# Patient Record
Sex: MALE | Race: WHITE | Employment: OTHER | URBAN - METROPOLITAN AREA
[De-identification: names, ages, dates, MRNs, and addresses within clinical notes are randomized per-mention and may not be internally consistent; named-entity substitution may affect disease eponyms.]

---

## 2017-10-12 PROBLEM — J18.9 LEFT LOWER LOBE PNEUMONIA: Status: ACTIVE | Noted: 2017-10-12

## 2017-10-12 PROBLEM — J44.9 COPD (CHRONIC OBSTRUCTIVE PULMONARY DISEASE) (HCC): Chronic | Status: ACTIVE | Noted: 2017-10-12

## 2017-10-12 PROBLEM — J96.21 ACUTE AND CHRONIC RESPIRATORY FAILURE WITH HYPOXIA (HCC): Status: ACTIVE | Noted: 2017-10-12

## 2017-12-22 PROBLEM — S92.321A: Status: ACTIVE | Noted: 2017-12-22

## 2018-02-21 PROBLEM — S92.321D CLOSED DISPLACED FRACTURE OF SECOND METATARSAL BONE OF RIGHT FOOT WITH ROUTINE HEALING: Status: ACTIVE | Noted: 2017-12-22

## 2018-02-23 PROBLEM — J44.1 COPD EXACERBATION (HCC): Status: ACTIVE | Noted: 2018-02-23

## 2018-04-16 DIAGNOSIS — S92.321D CLOSED DISPLACED FRACTURE OF SECOND METATARSAL BONE OF RIGHT FOOT WITH ROUTINE HEALING: Primary | ICD-10-CM

## 2018-04-18 ENCOUNTER — HOSPITAL ENCOUNTER (OUTPATIENT)
Dept: GENERAL RADIOLOGY | Age: 68
Discharge: HOME | End: 2018-04-20
Payer: COMMERCIAL

## 2018-04-18 ENCOUNTER — OFFICE VISIT (OUTPATIENT)
Dept: ORTHOPEDIC SURGERY | Age: 68
End: 2018-04-18
Payer: COMMERCIAL

## 2018-04-18 VITALS
HEIGHT: 73 IN | DIASTOLIC BLOOD PRESSURE: 84 MMHG | WEIGHT: 210 LBS | BODY MASS INDEX: 27.83 KG/M2 | TEMPERATURE: 97.4 F | HEART RATE: 83 BPM | SYSTOLIC BLOOD PRESSURE: 117 MMHG | RESPIRATION RATE: 18 BRPM

## 2018-04-18 DIAGNOSIS — S92.321D CLOSED DISPLACED FRACTURE OF SECOND METATARSAL BONE OF RIGHT FOOT WITH ROUTINE HEALING: Primary | ICD-10-CM

## 2018-04-18 DIAGNOSIS — S92.321D CLOSED DISPLACED FRACTURE OF SECOND METATARSAL BONE OF RIGHT FOOT WITH ROUTINE HEALING: ICD-10-CM

## 2018-04-18 PROCEDURE — 99212 OFFICE O/P EST SF 10 MIN: CPT

## 2018-04-18 PROCEDURE — 73630 X-RAY EXAM OF FOOT: CPT

## 2018-04-18 PROCEDURE — 99213 OFFICE O/P EST LOW 20 MIN: CPT | Performed by: ORTHOPAEDIC SURGERY

## 2018-11-12 ENCOUNTER — APPOINTMENT (OUTPATIENT)
Dept: GENERAL RADIOLOGY | Age: 68
DRG: 193 | End: 2018-11-12
Payer: MEDICARE

## 2018-11-12 ENCOUNTER — HOSPITAL ENCOUNTER (INPATIENT)
Age: 68
LOS: 4 days | Discharge: HOME | DRG: 193 | End: 2018-11-16
Attending: EMERGENCY MEDICINE | Admitting: INTERNAL MEDICINE
Payer: MEDICARE

## 2018-11-12 DIAGNOSIS — R07.9 CHEST PAIN, UNSPECIFIED TYPE: ICD-10-CM

## 2018-11-12 DIAGNOSIS — J44.1 COPD WITH ACUTE EXACERBATION (HCC): Primary | ICD-10-CM

## 2018-11-12 DIAGNOSIS — R09.02 HYPOXIA: ICD-10-CM

## 2018-11-12 PROBLEM — J43.9 BULLOUS EMPHYSEMA (HCC): Status: ACTIVE | Noted: 2018-11-12

## 2018-11-12 PROBLEM — J96.01 ACUTE RESPIRATORY FAILURE WITH HYPOXIA (HCC): Status: ACTIVE | Noted: 2018-11-12

## 2018-11-12 LAB
ALBUMIN SERPL-MCNC: 4.2 G/DL (ref 3.5–5.2)
ALP BLD-CCNC: 71 U/L (ref 40–129)
ALT SERPL-CCNC: 8 U/L (ref 0–40)
ANION GAP SERPL CALCULATED.3IONS-SCNC: 13 MMOL/L (ref 7–16)
AST SERPL-CCNC: 12 U/L (ref 0–39)
BASOPHILS ABSOLUTE: 0.02 E9/L (ref 0–0.2)
BASOPHILS RELATIVE PERCENT: 0.2 % (ref 0–2)
BILIRUB SERPL-MCNC: 0.4 MG/DL (ref 0–1.2)
BUN BLDV-MCNC: 12 MG/DL (ref 8–23)
CALCIUM SERPL-MCNC: 9.5 MG/DL (ref 8.6–10.2)
CHLORIDE BLD-SCNC: 100 MMOL/L (ref 98–107)
CO2: 26 MMOL/L (ref 22–29)
CREAT SERPL-MCNC: 0.8 MG/DL (ref 0.7–1.2)
D DIMER: 207 NG/ML DDU
EKG ATRIAL RATE: 63 BPM
EKG ATRIAL RATE: 74 BPM
EKG P AXIS: 52 DEGREES
EKG P-R INTERVAL: 170 MS
EKG Q-T INTERVAL: 406 MS
EKG Q-T INTERVAL: 416 MS
EKG QRS DURATION: 100 MS
EKG QRS DURATION: 106 MS
EKG QTC CALCULATION (BAZETT): 429 MS
EKG QTC CALCULATION (BAZETT): 450 MS
EKG R AXIS: 11 DEGREES
EKG R AXIS: 77 DEGREES
EKG T AXIS: 7 DEGREES
EKG T AXIS: 74 DEGREES
EKG VENTRICULAR RATE: 64 BPM
EKG VENTRICULAR RATE: 74 BPM
EOSINOPHILS ABSOLUTE: 0.02 E9/L (ref 0.05–0.5)
EOSINOPHILS RELATIVE PERCENT: 0.2 % (ref 0–6)
FILM ARRAY ADENOVIRUS: NORMAL
FILM ARRAY BORDETELLA PERTUSSIS: NORMAL
FILM ARRAY CHLAMYDOPHILIA PNEUMONIAE: NORMAL
FILM ARRAY CORONAVIRUS 229E: NORMAL
FILM ARRAY CORONAVIRUS HKU1: NORMAL
FILM ARRAY CORONAVIRUS NL63: NORMAL
FILM ARRAY CORONAVIRUS OC43: NORMAL
FILM ARRAY INFLUENZA A VIRUS 09H1: NORMAL
FILM ARRAY INFLUENZA A VIRUS H1: NORMAL
FILM ARRAY INFLUENZA A VIRUS H3: NORMAL
FILM ARRAY INFLUENZA A VIRUS: NORMAL
FILM ARRAY INFLUENZA B: NORMAL
FILM ARRAY METAPNEUMOVIRUS: NORMAL
FILM ARRAY MYCOPLASMA PNEUMONIAE: NORMAL
FILM ARRAY PARAINFLUENZA VIRUS 1: NORMAL
FILM ARRAY PARAINFLUENZA VIRUS 2: NORMAL
FILM ARRAY PARAINFLUENZA VIRUS 3: NORMAL
FILM ARRAY PARAINFLUENZA VIRUS 4: NORMAL
FILM ARRAY RESPIRATORY SYNCITIAL VIRUS: NORMAL
FILM ARRAY RHINOVIRUS/ENTEROVIRUS: NORMAL
FOLATE: 14.1 NG/ML (ref 4.8–24.2)
GFR AFRICAN AMERICAN: >60
GFR NON-AFRICAN AMERICAN: >60 ML/MIN/1.73
GLUCOSE BLD-MCNC: 127 MG/DL (ref 74–99)
HBA1C MFR BLD: 5.6 % (ref 4–5.6)
HCT VFR BLD CALC: 47 % (ref 37–54)
HEMOGLOBIN: 15.6 G/DL (ref 12.5–16.5)
IMMATURE GRANULOCYTES #: 0.04 E9/L
IMMATURE GRANULOCYTES %: 0.5 % (ref 0–5)
LACTIC ACID, SEPSIS: 2.1 MMOL/L (ref 0.5–1.9)
LYMPHOCYTES ABSOLUTE: 0.98 E9/L (ref 1.5–4)
LYMPHOCYTES RELATIVE PERCENT: 11.6 % (ref 20–42)
MCH RBC QN AUTO: 30.7 PG (ref 26–35)
MCHC RBC AUTO-ENTMCNC: 33.2 % (ref 32–34.5)
MCV RBC AUTO: 92.5 FL (ref 80–99.9)
MONOCYTES ABSOLUTE: 0.22 E9/L (ref 0.1–0.95)
MONOCYTES RELATIVE PERCENT: 2.6 % (ref 2–12)
NEUTROPHILS ABSOLUTE: 7.15 E9/L (ref 1.8–7.3)
NEUTROPHILS RELATIVE PERCENT: 84.9 % (ref 43–80)
PDW BLD-RTO: 14.2 FL (ref 11.5–15)
PHOSPHORUS: 2.9 MG/DL (ref 2.5–4.5)
PLATELET # BLD: 198 E9/L (ref 130–450)
PMV BLD AUTO: 9.3 FL (ref 7–12)
POTASSIUM SERPL-SCNC: 3.8 MMOL/L (ref 3.5–5)
PRO-BNP: 204 PG/ML (ref 0–125)
PROCALCITONIN: 0.03 NG/ML (ref 0–0.08)
RBC # BLD: 5.08 E12/L (ref 3.8–5.8)
SODIUM BLD-SCNC: 139 MMOL/L (ref 132–146)
TOTAL PROTEIN: 6.9 G/DL (ref 6.4–8.3)
TROPONIN: <0.01 NG/ML (ref 0–0.03)
VITAMIN B-12: 401 PG/ML (ref 211–946)
WBC # BLD: 8.4 E9/L (ref 4.5–11.5)

## 2018-11-12 PROCEDURE — 94761 N-INVAS EAR/PLS OXIMETRY MLT: CPT

## 2018-11-12 PROCEDURE — 82746 ASSAY OF FOLIC ACID SERUM: CPT

## 2018-11-12 PROCEDURE — 96374 THER/PROPH/DIAG INJ IV PUSH: CPT

## 2018-11-12 PROCEDURE — 94640 AIRWAY INHALATION TREATMENT: CPT

## 2018-11-12 PROCEDURE — 84145 PROCALCITONIN (PCT): CPT

## 2018-11-12 PROCEDURE — 2580000003 HC RX 258: Performed by: INTERNAL MEDICINE

## 2018-11-12 PROCEDURE — 87633 RESP VIRUS 12-25 TARGETS: CPT

## 2018-11-12 PROCEDURE — 6370000000 HC RX 637 (ALT 250 FOR IP): Performed by: INTERNAL MEDICINE

## 2018-11-12 PROCEDURE — 94664 DEMO&/EVAL PT USE INHALER: CPT

## 2018-11-12 PROCEDURE — 83036 HEMOGLOBIN GLYCOSYLATED A1C: CPT

## 2018-11-12 PROCEDURE — 93005 ELECTROCARDIOGRAM TRACING: CPT | Performed by: EMERGENCY MEDICINE

## 2018-11-12 PROCEDURE — 87798 DETECT AGENT NOS DNA AMP: CPT

## 2018-11-12 PROCEDURE — 99285 EMERGENCY DEPT VISIT HI MDM: CPT

## 2018-11-12 PROCEDURE — 6370000000 HC RX 637 (ALT 250 FOR IP): Performed by: EMERGENCY MEDICINE

## 2018-11-12 PROCEDURE — 83880 ASSAY OF NATRIURETIC PEPTIDE: CPT

## 2018-11-12 PROCEDURE — 87070 CULTURE OTHR SPECIMN AEROBIC: CPT

## 2018-11-12 PROCEDURE — 84100 ASSAY OF PHOSPHORUS: CPT

## 2018-11-12 PROCEDURE — 6360000002 HC RX W HCPCS: Performed by: INTERNAL MEDICINE

## 2018-11-12 PROCEDURE — 87040 BLOOD CULTURE FOR BACTERIA: CPT

## 2018-11-12 PROCEDURE — 87450 HC DIRECT STREP B ANTIGEN: CPT

## 2018-11-12 PROCEDURE — 85025 COMPLETE CBC W/AUTO DIFF WBC: CPT

## 2018-11-12 PROCEDURE — 87205 SMEAR GRAM STAIN: CPT

## 2018-11-12 PROCEDURE — 87088 URINE BACTERIA CULTURE: CPT

## 2018-11-12 PROCEDURE — 6360000002 HC RX W HCPCS: Performed by: EMERGENCY MEDICINE

## 2018-11-12 PROCEDURE — 82607 VITAMIN B-12: CPT

## 2018-11-12 PROCEDURE — 85378 FIBRIN DEGRADE SEMIQUANT: CPT

## 2018-11-12 PROCEDURE — 87486 CHLMYD PNEUM DNA AMP PROBE: CPT

## 2018-11-12 PROCEDURE — 83605 ASSAY OF LACTIC ACID: CPT

## 2018-11-12 PROCEDURE — 36415 COLL VENOUS BLD VENIPUNCTURE: CPT

## 2018-11-12 PROCEDURE — 71046 X-RAY EXAM CHEST 2 VIEWS: CPT

## 2018-11-12 PROCEDURE — 84484 ASSAY OF TROPONIN QUANT: CPT

## 2018-11-12 PROCEDURE — 80053 COMPREHEN METABOLIC PANEL: CPT

## 2018-11-12 PROCEDURE — 2060000000 HC ICU INTERMEDIATE R&B

## 2018-11-12 PROCEDURE — 87581 M.PNEUMON DNA AMP PROBE: CPT

## 2018-11-12 RX ORDER — ONDANSETRON 2 MG/ML
4 INJECTION INTRAMUSCULAR; INTRAVENOUS EVERY 6 HOURS PRN
Status: DISCONTINUED | OUTPATIENT
Start: 2018-11-12 | End: 2018-11-16 | Stop reason: HOSPADM

## 2018-11-12 RX ORDER — SODIUM CHLORIDE 9 MG/ML
INJECTION, SOLUTION INTRAVENOUS CONTINUOUS
Status: DISCONTINUED | OUTPATIENT
Start: 2018-11-12 | End: 2018-11-13

## 2018-11-12 RX ORDER — PREDNISONE 20 MG/1
1 TABLET ORAL EVERY MORNING
Refills: 0 | COMMUNITY
Start: 2018-11-06 | End: 2018-11-12

## 2018-11-12 RX ORDER — METHYLPREDNISOLONE SODIUM SUCCINATE 125 MG/2ML
80 INJECTION, POWDER, LYOPHILIZED, FOR SOLUTION INTRAMUSCULAR; INTRAVENOUS EVERY 8 HOURS
Status: DISCONTINUED | OUTPATIENT
Start: 2018-11-12 | End: 2018-11-13

## 2018-11-12 RX ORDER — PANTOPRAZOLE SODIUM 40 MG/1
40 TABLET, DELAYED RELEASE ORAL
Status: DISCONTINUED | OUTPATIENT
Start: 2018-11-13 | End: 2018-11-16 | Stop reason: HOSPADM

## 2018-11-12 RX ORDER — METHYLPREDNISOLONE SODIUM SUCCINATE 125 MG/2ML
125 INJECTION, POWDER, LYOPHILIZED, FOR SOLUTION INTRAMUSCULAR; INTRAVENOUS ONCE
Status: COMPLETED | OUTPATIENT
Start: 2018-11-12 | End: 2018-11-12

## 2018-11-12 RX ORDER — BENZONATATE 100 MG/1
100 CAPSULE ORAL 3 TIMES DAILY
Status: DISCONTINUED | OUTPATIENT
Start: 2018-11-12 | End: 2018-11-14

## 2018-11-12 RX ORDER — BUDESONIDE 0.25 MG/2ML
250 INHALANT ORAL 2 TIMES DAILY
Status: DISCONTINUED | OUTPATIENT
Start: 2018-11-12 | End: 2018-11-12

## 2018-11-12 RX ORDER — ACETAMINOPHEN 325 MG/1
650 TABLET ORAL EVERY 4 HOURS PRN
Status: DISCONTINUED | OUTPATIENT
Start: 2018-11-12 | End: 2018-11-16 | Stop reason: HOSPADM

## 2018-11-12 RX ORDER — GUAIFENESIN AND CODEINE PHOSPHATE 100; 10 MG/5ML; MG/5ML
5 SOLUTION ORAL
COMMUNITY
End: 2019-04-22 | Stop reason: ALTCHOICE

## 2018-11-12 RX ORDER — CODEINE PHOSPHATE AND GUAIFENESIN 10; 100 MG/5ML; MG/5ML
5 SOLUTION ORAL EVERY 4 HOURS PRN
Status: DISCONTINUED | OUTPATIENT
Start: 2018-11-12 | End: 2018-11-16 | Stop reason: HOSPADM

## 2018-11-12 RX ORDER — IPRATROPIUM BROMIDE AND ALBUTEROL SULFATE 2.5; .5 MG/3ML; MG/3ML
1 SOLUTION RESPIRATORY (INHALATION)
Status: DISCONTINUED | OUTPATIENT
Start: 2018-11-12 | End: 2018-11-13

## 2018-11-12 RX ORDER — IPRATROPIUM BROMIDE AND ALBUTEROL SULFATE 2.5; .5 MG/3ML; MG/3ML
3 SOLUTION RESPIRATORY (INHALATION) ONCE
Status: COMPLETED | OUTPATIENT
Start: 2018-11-12 | End: 2018-11-12

## 2018-11-12 RX ORDER — IBUPROFEN 200 MG
200 TABLET ORAL
Status: DISCONTINUED | OUTPATIENT
Start: 2018-11-12 | End: 2018-11-16 | Stop reason: HOSPADM

## 2018-11-12 RX ORDER — BUDESONIDE 0.25 MG/2ML
1000 INHALANT ORAL 2 TIMES DAILY
Status: DISCONTINUED | OUTPATIENT
Start: 2018-11-12 | End: 2018-11-16 | Stop reason: HOSPADM

## 2018-11-12 RX ORDER — FORMOTEROL FUMARATE 20 UG/2ML
20 SOLUTION RESPIRATORY (INHALATION) 2 TIMES DAILY
Status: DISCONTINUED | OUTPATIENT
Start: 2018-11-12 | End: 2018-11-16 | Stop reason: HOSPADM

## 2018-11-12 RX ADMIN — BUDESONIDE 1000 MCG: 0.25 SUSPENSION RESPIRATORY (INHALATION) at 21:10

## 2018-11-12 RX ADMIN — BENZONATATE 100 MG: 100 CAPSULE ORAL at 20:06

## 2018-11-12 RX ADMIN — SODIUM CHLORIDE: 9 INJECTION, SOLUTION INTRAVENOUS at 14:39

## 2018-11-12 RX ADMIN — ENOXAPARIN SODIUM 40 MG: 40 INJECTION SUBCUTANEOUS at 15:56

## 2018-11-12 RX ADMIN — METHYLPREDNISOLONE SODIUM SUCCINATE 125 MG: 125 INJECTION, POWDER, FOR SOLUTION INTRAMUSCULAR; INTRAVENOUS at 11:44

## 2018-11-12 RX ADMIN — IPRATROPIUM BROMIDE AND ALBUTEROL SULFATE 1 AMPULE: .5; 3 SOLUTION RESPIRATORY (INHALATION) at 21:10

## 2018-11-12 RX ADMIN — FORMOTEROL FUMARATE DIHYDRATE 20 MCG: 20 SOLUTION RESPIRATORY (INHALATION) at 21:10

## 2018-11-12 RX ADMIN — BENZONATATE 100 MG: 100 CAPSULE ORAL at 15:56

## 2018-11-12 RX ADMIN — IPRATROPIUM BROMIDE AND ALBUTEROL SULFATE 1 AMPULE: .5; 3 SOLUTION RESPIRATORY (INHALATION) at 17:45

## 2018-11-12 RX ADMIN — IPRATROPIUM BROMIDE AND ALBUTEROL SULFATE 3 AMPULE: .5; 3 SOLUTION RESPIRATORY (INHALATION) at 10:46

## 2018-11-12 RX ADMIN — METHYLPREDNISOLONE SODIUM SUCCINATE 80 MG: 125 INJECTION, POWDER, LYOPHILIZED, FOR SOLUTION INTRAMUSCULAR; INTRAVENOUS at 20:06

## 2018-11-12 ASSESSMENT — ENCOUNTER SYMPTOMS
SPUTUM PRODUCTION: 0
ABDOMINAL PAIN: 0
HEMOPTYSIS: 0
COUGH: 1
VOMITING: 0
WHEEZING: 0
SORE THROAT: 0
SHORTNESS OF BREATH: 1

## 2018-11-12 ASSESSMENT — PAIN SCALES - GENERAL
PAINLEVEL_OUTOF10: 0
PAINLEVEL_OUTOF10: 0

## 2018-11-12 NOTE — ED PROVIDER NOTES
---------------------------  Date / Time Roomed:  11/12/2018 10:08 AM  ED Bed Assignment:  13/13    The nursing notes within the ED encounter and vital signs as below have been reviewed. Patient Vitals for the past 24 hrs:   BP Temp Temp src Pulse Resp SpO2 Height Weight   11/12/18 1146 - - - - - 93 % - -   11/12/18 1015 124/77 97.8 °F (36.6 °C) Oral 66 20 (!) 85 % 6' 1\" (1.854 m) 205 lb (93 kg)       Oxygen Saturation Interpretation: Abnormal and Improved after treatment    ------------------------------------------ PROGRESS NOTES ------------------------------------------  Re-evaluation(s):  See above    Counseling:  I have spoken with the patient and discussed todays results, in addition to providing specific details for the plan of care and counseling regarding the diagnosis and prognosis. Their questions are answered at this time and they are agreeable with the plan of admission.    --------------------------------- ADDITIONAL PROVIDER NOTES ---------------------------------  Consultations:  Time: 9618. Spoke with Dr. Lisbeth Jeffery. Discussed case. They will admit the patient. This patient's ED course included: a personal history and physicial examination, re-evaluation prior to disposition, multiple bedside re-evaluations, IV medications, cardiac monitoring and continuous pulse oximetry    This patient has remained hemodynamically stable during their ED course. Diagnosis:  1. COPD with acute exacerbation (HCC)    2. Chest pain, unspecified type    3. Hypoxia        Disposition:  Patient's disposition: Admit to telemetry  Patient's condition is stable.               Kiersten Villalta DO  Resident  11/12/18 7480

## 2018-11-12 NOTE — PLAN OF CARE
Problem: Gas Exchange - Impaired:  Goal: Able to breathe comfortably  Able to breathe comfortably    Outcome: Met This Shift

## 2018-11-12 NOTE — CONSULTS
weakness or joint complaints. · Integumentary: No rash or pruritis. No abnormal pigmentation, hair or nail changes. · Neurological: No headache, diplopia, dizziness, tremor, change in muscle strength, numbness or tingling. No change in gait, balance, coordination, mood, affect, memory, mentation, behavior. · Psychiatric: No anxiety or depression. · Endocrine: No temperature intolerance, excessive thirst, fluid intake, urinary frequency, excessive appetite, or recent weight change. · Hematologic/Lymphatic: No abnormal bruising or bleeding, blood clots or swollen lymph nodes. No anemia, fever, chills, night sweats, or swollen glands. · Allergic/Immunologic: No seasonal or perenial allergies. No history of hives or atopic dermatitis. Social History:  · Alcohol:  No  · Tobacco:   Quit   · Employment:  no silica or asbestos exposure  · Family:  No family history of lung disease    Medications:   sodium chloride 100 mL/hr at 18 1439      ibuprofen  200 mg Oral TID WC    enoxaparin  40 mg Subcutaneous Daily    formoterol  20 mcg Nebulization BID    benzonatate  100 mg Oral TID    ipratropium-albuterol  1 ampule Inhalation Q4H WA    [START ON 2018] pantoprazole  40 mg Oral QAM AC    methylPREDNISolone  80 mg Intravenous Q8H    budesonide  1,000 mcg Nebulization BID       Vitals:  Tmax:  VITALS:  BP (!) 115/56   Pulse 73   Temp 97.7 °F (36.5 °C) (Oral)   Resp 18   Ht 6' 1\" (1.854 m)   Wt 206 lb 1 oz (93.5 kg)   SpO2 92%   BMI 27.19 kg/m²   24HR INTAKE/OUTPUT:  No intake or output data in the 24 hours ending 18 1624  CURRENT PULSE OXIMETRY:  SpO2: 92 %  24HR PULSE OXIMETRY RANGE:  SpO2  Av.5 %  Min: 85 %  Max: 93 %    EXAM:  General: No distress. Alert. Eyes: PERRL. No sclera icterus. No conjunctival injection. ENT: No discharge. Pharynx clear. Neck: Trachea midline. Normal thyroid. No jvd, no hjr. Resp: Minimal wheezing. No accessory muscle use. Bibasilar rales.  No rhonchi. CV: Regular rate. Regular rhythm. No murmur No rub. Abd: Non-tender. Non-distended. No masses. No organmegaly. Normal bowel sounds. Skin: Warm and dry. No nodule on exposed extremities. No rash on exposed extremities. Lymph: No cervical LAD. No supraclavicular LAD. Ext: No joint deformity. No clubbing. No cyanosis. No edema  Neuro: Awake. Follows commands. Positive pupils/gag/corneals. Normal pain response. Lab Results:  CBC:   Recent Labs      11/12/18   1136   WBC  8.4   HGB  15.6   HCT  47.0   MCV  92.5   PLT  198       BMP:  Recent Labs      11/12/18   1136  11/12/18   1558   NA  139   --    K  3.8   --    CL  100   --    CO2  26   --    PHOS   --   2.9   BUN  12   --    CREATININE  0.8   --     ALB:3,BILIDIR:3,BILITOT:3,ALKPHOS:3)@    PT/INR: No results for input(s): PROTIME, INR in the last 72 hours. Cultures:  Sputum: not available  Blood: not available    ABG:   No results for input(s): PH, PO2, PCO2, HCO3, BE, O2SAT, METHB, O2HB, COHB, O2CON, HHB, THB in the last 72 hours. Films:     XR CHEST STANDARD (2 VW)   Final Result   Chronic obstructive pulmonary disease      Fibrosis seen in both lung bases      Right-sided pleural effusion with pleural reaction. Unchanged from prior study with no evidence of airspace consolidation   or pulmonary venous congestion         . Assessment:  1. Acute exacerbation of chronic obstructive pulmonary disease with failed outpatient treatment. 2. Chronic hypoxia secondary to #1  3. 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 Adjust aerosols  2. IV steroids  3. Supplemental oxygen  4. Infection screens      Thanks for letting us see this patient in consultation. Please contact us with any questions. Office [phone] or after hours through [employer], x [phone].     Please note that voice recognition technology was used in the preparation of this note and make therefore it may contain inadvertent transcription errors    Alicia Hdez M.D., FAbenaCAbenaCMILADYS     [facility], [address], [address] [place] [address]

## 2018-11-13 LAB
ALBUMIN SERPL-MCNC: 4 G/DL (ref 3.5–5.2)
ALP BLD-CCNC: 73 U/L (ref 40–129)
ALT SERPL-CCNC: 8 U/L (ref 0–40)
ANION GAP SERPL CALCULATED.3IONS-SCNC: 13 MMOL/L (ref 7–16)
AST SERPL-CCNC: 12 U/L (ref 0–39)
BASOPHILS ABSOLUTE: 0.01 E9/L (ref 0–0.2)
BASOPHILS RELATIVE PERCENT: 0.1 % (ref 0–2)
BILIRUB SERPL-MCNC: 0.3 MG/DL (ref 0–1.2)
BUN BLDV-MCNC: 12 MG/DL (ref 8–23)
CALCIUM SERPL-MCNC: 9.7 MG/DL (ref 8.6–10.2)
CHLORIDE BLD-SCNC: 103 MMOL/L (ref 98–107)
CHOLESTEROL, TOTAL: 151 MG/DL (ref 0–199)
CO2: 25 MMOL/L (ref 22–29)
CREAT SERPL-MCNC: 0.6 MG/DL (ref 0.7–1.2)
EOSINOPHILS ABSOLUTE: 0 E9/L (ref 0.05–0.5)
EOSINOPHILS RELATIVE PERCENT: 0 % (ref 0–6)
GFR AFRICAN AMERICAN: >60
GFR NON-AFRICAN AMERICAN: >60 ML/MIN/1.73
GLUCOSE BLD-MCNC: 148 MG/DL (ref 74–99)
HCT VFR BLD CALC: 44.7 % (ref 37–54)
HDLC SERPL-MCNC: 71 MG/DL
HEMOGLOBIN: 15 G/DL (ref 12.5–16.5)
IMMATURE GRANULOCYTES #: 0.05 E9/L
IMMATURE GRANULOCYTES %: 0.5 % (ref 0–5)
L. PNEUMOPHILA SEROGP 1 UR AG: NORMAL
LDL CHOLESTEROL CALCULATED: 67 MG/DL (ref 0–99)
LV EF: 62 %
LVEF MODALITY: NORMAL
LYMPHOCYTES ABSOLUTE: 0.51 E9/L (ref 1.5–4)
LYMPHOCYTES RELATIVE PERCENT: 4.8 % (ref 20–42)
MAGNESIUM: 2 MG/DL (ref 1.6–2.6)
MCH RBC QN AUTO: 30.4 PG (ref 26–35)
MCHC RBC AUTO-ENTMCNC: 33.6 % (ref 32–34.5)
MCV RBC AUTO: 90.7 FL (ref 80–99.9)
MONOCYTES ABSOLUTE: 0.11 E9/L (ref 0.1–0.95)
MONOCYTES RELATIVE PERCENT: 1 % (ref 2–12)
NEUTROPHILS ABSOLUTE: 9.96 E9/L (ref 1.8–7.3)
NEUTROPHILS RELATIVE PERCENT: 93.6 % (ref 43–80)
PDW BLD-RTO: 13.9 FL (ref 11.5–15)
PLATELET # BLD: 260 E9/L (ref 130–450)
PMV BLD AUTO: 9.9 FL (ref 7–12)
POTASSIUM SERPL-SCNC: 4.3 MMOL/L (ref 3.5–5)
RBC # BLD: 4.93 E12/L (ref 3.8–5.8)
SODIUM BLD-SCNC: 141 MMOL/L (ref 132–146)
STREP PNEUMONIAE ANTIGEN, URINE: NORMAL
TOTAL PROTEIN: 6.6 G/DL (ref 6.4–8.3)
TRIGL SERPL-MCNC: 67 MG/DL (ref 0–149)
TSH SERPL DL<=0.05 MIU/L-ACNC: 0.39 UIU/ML (ref 0.27–4.2)
URIC ACID, SERUM: 4.5 MG/DL (ref 3.4–7)
VLDLC SERPL CALC-MCNC: 13 MG/DL
WBC # BLD: 10.6 E9/L (ref 4.5–11.5)

## 2018-11-13 PROCEDURE — 6370000000 HC RX 637 (ALT 250 FOR IP): Performed by: INTERNAL MEDICINE

## 2018-11-13 PROCEDURE — 80053 COMPREHEN METABOLIC PANEL: CPT

## 2018-11-13 PROCEDURE — 83735 ASSAY OF MAGNESIUM: CPT

## 2018-11-13 PROCEDURE — 6360000002 HC RX W HCPCS: Performed by: INTERNAL MEDICINE

## 2018-11-13 PROCEDURE — G8989 SELF CARE D/C STATUS: HCPCS

## 2018-11-13 PROCEDURE — 2060000000 HC ICU INTERMEDIATE R&B

## 2018-11-13 PROCEDURE — 93306 TTE W/DOPPLER COMPLETE: CPT

## 2018-11-13 PROCEDURE — 94640 AIRWAY INHALATION TREATMENT: CPT

## 2018-11-13 PROCEDURE — 80061 LIPID PANEL: CPT

## 2018-11-13 PROCEDURE — G8988 SELF CARE GOAL STATUS: HCPCS

## 2018-11-13 PROCEDURE — 85025 COMPLETE CBC W/AUTO DIFF WBC: CPT

## 2018-11-13 PROCEDURE — 84443 ASSAY THYROID STIM HORMONE: CPT

## 2018-11-13 PROCEDURE — 84550 ASSAY OF BLOOD/URIC ACID: CPT

## 2018-11-13 PROCEDURE — 97161 PT EVAL LOW COMPLEX 20 MIN: CPT

## 2018-11-13 PROCEDURE — 36415 COLL VENOUS BLD VENIPUNCTURE: CPT

## 2018-11-13 PROCEDURE — 2580000003 HC RX 258: Performed by: INTERNAL MEDICINE

## 2018-11-13 PROCEDURE — 2700000000 HC OXYGEN THERAPY PER DAY

## 2018-11-13 PROCEDURE — G8987 SELF CARE CURRENT STATUS: HCPCS

## 2018-11-13 PROCEDURE — 97165 OT EVAL LOW COMPLEX 30 MIN: CPT

## 2018-11-13 RX ORDER — IPRATROPIUM BROMIDE AND ALBUTEROL SULFATE 2.5; .5 MG/3ML; MG/3ML
1 SOLUTION RESPIRATORY (INHALATION) EVERY 4 HOURS
Status: DISCONTINUED | OUTPATIENT
Start: 2018-11-13 | End: 2018-11-16 | Stop reason: HOSPADM

## 2018-11-13 RX ORDER — METHYLPREDNISOLONE SODIUM SUCCINATE 40 MG/ML
40 INJECTION, POWDER, LYOPHILIZED, FOR SOLUTION INTRAMUSCULAR; INTRAVENOUS EVERY 8 HOURS
Status: DISCONTINUED | OUTPATIENT
Start: 2018-11-13 | End: 2018-11-14

## 2018-11-13 RX ADMIN — METHYLPREDNISOLONE SODIUM SUCCINATE 40 MG: 40 INJECTION, POWDER, LYOPHILIZED, FOR SOLUTION INTRAMUSCULAR; INTRAVENOUS at 20:07

## 2018-11-13 RX ADMIN — IBUPROFEN 200 MG: 200 TABLET, FILM COATED ORAL at 11:46

## 2018-11-13 RX ADMIN — GUAIFENESIN AND CODEINE PHOSPHATE 5 ML: 10; 100 LIQUID ORAL at 21:55

## 2018-11-13 RX ADMIN — IBUPROFEN 200 MG: 200 TABLET, FILM COATED ORAL at 17:22

## 2018-11-13 RX ADMIN — BENZONATATE 100 MG: 100 CAPSULE ORAL at 14:28

## 2018-11-13 RX ADMIN — BENZONATATE 100 MG: 100 CAPSULE ORAL at 08:42

## 2018-11-13 RX ADMIN — ENOXAPARIN SODIUM 40 MG: 40 INJECTION SUBCUTANEOUS at 14:28

## 2018-11-13 RX ADMIN — IPRATROPIUM BROMIDE AND ALBUTEROL SULFATE 1 AMPULE: .5; 3 SOLUTION RESPIRATORY (INHALATION) at 08:31

## 2018-11-13 RX ADMIN — PANTOPRAZOLE SODIUM 40 MG: 40 TABLET, DELAYED RELEASE ORAL at 06:42

## 2018-11-13 RX ADMIN — FORMOTEROL FUMARATE DIHYDRATE 20 MCG: 20 SOLUTION RESPIRATORY (INHALATION) at 19:15

## 2018-11-13 RX ADMIN — BUDESONIDE 1000 MCG: 0.25 SUSPENSION RESPIRATORY (INHALATION) at 08:31

## 2018-11-13 RX ADMIN — GUAIFENESIN AND CODEINE PHOSPHATE 5 ML: 10; 100 LIQUID ORAL at 00:34

## 2018-11-13 RX ADMIN — SODIUM CHLORIDE: 9 INJECTION, SOLUTION INTRAVENOUS at 11:46

## 2018-11-13 RX ADMIN — IPRATROPIUM BROMIDE AND ALBUTEROL SULFATE 1 AMPULE: .5; 3 SOLUTION RESPIRATORY (INHALATION) at 15:35

## 2018-11-13 RX ADMIN — FORMOTEROL FUMARATE DIHYDRATE 20 MCG: 20 SOLUTION RESPIRATORY (INHALATION) at 08:31

## 2018-11-13 RX ADMIN — BENZONATATE 100 MG: 100 CAPSULE ORAL at 20:07

## 2018-11-13 RX ADMIN — IPRATROPIUM BROMIDE AND ALBUTEROL SULFATE 1 AMPULE: .5; 3 SOLUTION RESPIRATORY (INHALATION) at 12:14

## 2018-11-13 RX ADMIN — METHYLPREDNISOLONE SODIUM SUCCINATE 80 MG: 125 INJECTION, POWDER, LYOPHILIZED, FOR SOLUTION INTRAMUSCULAR; INTRAVENOUS at 04:18

## 2018-11-13 RX ADMIN — METHYLPREDNISOLONE SODIUM SUCCINATE 80 MG: 125 INJECTION, POWDER, LYOPHILIZED, FOR SOLUTION INTRAMUSCULAR; INTRAVENOUS at 11:46

## 2018-11-13 RX ADMIN — BUDESONIDE 1000 MCG: 0.25 SUSPENSION RESPIRATORY (INHALATION) at 19:15

## 2018-11-13 RX ADMIN — IBUPROFEN 200 MG: 200 TABLET, FILM COATED ORAL at 08:42

## 2018-11-13 ASSESSMENT — PAIN SCALES - GENERAL
PAINLEVEL_OUTOF10: 0

## 2018-11-13 NOTE — PROGRESS NOTES
Dose    methylPREDNISolone sodium (SOLU-MEDROL) injection 40 mg  40 mg Intravenous Q8H Dax Monterroso MD        ipratropium-albuterol (DUONEB) nebulizer solution 1 ampule  1 ampule Inhalation Q4H Dax Monterroso MD        ibuprofen (ADVIL;MOTRIN) tablet 200 mg  200 mg Oral TID [facility] Keyla Mihok, DO   200 mg at 18 1146    enoxaparin (LOVENOX) injection 40 mg  40 mg Subcutaneous Daily Jim Bachk, DO   40 mg at 18 1428    formoterol (PERFOROMIST) nebulizer solution 20 mcg  20 mcg Nebulization BID [facility] Mihok, [facility]   20 mcg at 18 0831    benzonatate (TESSALON) capsule 100 mg  100 mg Oral TID [facility] Malvern Mihok, DO   100 mg at 18 1428    guaiFENesin-codeine (GUAIFENESIN AC) 100-10 MG/5ML liquid 5 mL  5 mL Oral Q4H PRN [facility]   5 mL at 18 0034    ondansetron (ZOFRAN) injection 4 mg  4 mg Intravenous Q6H PRN [facility] Keyla Mihok, DO        pantoprazole (PROTONIX) tablet 40 mg  40 mg Oral QAM AC Jim FRY Mihok, DO   40 mg at 18 2457    acetaminophen (TYLENOL) tablet 650 mg  650 mg Oral Q4H PRN [facility] Malvern Mihok, DO        budesonide (PULMICORT) nebulizer suspension 1,000 mcg  1,000 mcg Nebulization BID Dax Monterroso MD   1,000 mcg at 18 0831     Scheduled Meds:   methylPREDNISolone  40 mg Intravenous Q8H    ipratropium-albuterol  1 ampule Inhalation Q4H    ibuprofen  200 mg Oral TID     enoxaparin  40 mg Subcutaneous Daily    formoterol  20 mcg Nebulization BID    benzonatate  100 mg Oral TID    pantoprazole  40 mg Oral QAM AC    budesonide  1,000 mcg Nebulization BID       Continuous Infusions:      Data:   Temperature:  Current - Temp: 98.4 °F (36.9 °C);  Max - Temp  Av.1 °F (36.7 °C)  Min: 97.7 °F (36.5 °C)  Max: 98.4 °F (36.9 °C)    Respiratory Rate : Resp  Av.3  Min: 16  Max: 18    Pulse Range: Pulse  Av.7  Min: 74  Max: 76    Blood Presuure Range:  Systolic (14JNS), SGX:986 , Min:111 , ZPO:007   ; Diastolic (78FCJ), FRD:76, Min:68, Max:73      Pulse ox Range: SpO2  Av %  Min: 91 %  Max: 94 %    Patient Vitals for the past 8 hrs:   BP Temp Temp src Pulse Resp SpO2   18 1145 - - - - - 94 %   18 0815 119/71 98.4 °F (36.9 °C) Oral 76 16 91 %         Intake/Output Summary (Last 24 hours) at 18 1511  Last data filed at 18 1429   Gross per 24 hour   Intake           2174.5 ml   Output             1105 ml   Net           1069.5 ml       I/O last 3 completed shifts: In: 2174.5 [P.O.:1020; I.V.:1154.5]  Out: 1105 [Urine:1105]    No intake/output data recorded.     Wt Readings from Last 3 Encounters:   18 201 lb 11.2 oz (91.5 kg)   18 210 lb (95.3 kg)   18 188 lb 11.2 oz (85.6 kg)       Labs:   CBC:   Lab Results   Component Value Date    WBC 10.6 2018    RBC 4.93 2018    HGB 15.0 2018    HCT 44.7 2018    MCV 90.7 2018    MCH 30.4 2018    MCHC 33.6 2018    RDW 13.9 2018     2018    MPV 9.9 2018     CBC with Differential:    Lab Results   Component Value Date    WBC 10.6 2018    RBC 4.93 2018    HGB 15.0 2018    HCT 44.7 2018     2018    MCV 90.7 2018    MCH 30.4 2018    MCHC 33.6 2018    RDW 13.9 2018    LYMPHOPCT 4.8 2018    MONOPCT 1.0 2018    BASOPCT 0.1 2018    MONOSABS 0.11 2018    LYMPHSABS 0.51 2018    EOSABS 0.00 2018    BASOSABS 0.01 2018     Hemoglobin/Hematocrit:    Lab Results   Component Value Date    HGB 15.0 2018    HCT 44.7 2018     CMP:    Lab Results   Component Value Date     2018    K 4.3 2018    K 4.0 2018     2018    CO2 25 2018    BUN 12 2018    CREATININE 0.6 2018    GFRAA >60 2018    LABGLOM >60 2018    GLUCOSE 148 2018    PROT 6.6 2018    LABALBU 4.0 2018    CALCIUM 9.7 2018    BILITOT 0.3 2018

## 2018-11-13 NOTE — CARE COORDINATION
11/13/2018  Social Work Discharge Planning: This worker met with Pt to discuss  role and transition of care/discharge planning. Pt  lives with his spouse and per liaison Lorraine, Pt uses 3.5 l continuous home o2 through [address] DME. Pt also has a portable concentrator. No PATRICIA or HHC history . Awaiting Pt eval. [facility] and PCP is Dr. Deepa Pozo.  Electronically signed by MALVIN Pavon on 11/13/2018 at 10:30 AM

## 2018-11-14 ENCOUNTER — APPOINTMENT (OUTPATIENT)
Dept: CT IMAGING | Age: 68
DRG: 193 | End: 2018-11-14
Payer: MEDICARE

## 2018-11-14 LAB
BASOPHILS ABSOLUTE: 0.02 E9/L (ref 0–0.2)
BASOPHILS RELATIVE PERCENT: 0.2 % (ref 0–2)
CULTURE, RESPIRATORY: NORMAL
EOSINOPHILS ABSOLUTE: 0 E9/L (ref 0.05–0.5)
EOSINOPHILS RELATIVE PERCENT: 0 % (ref 0–6)
HCT VFR BLD CALC: 45.7 % (ref 37–54)
HEMOGLOBIN: 15.1 G/DL (ref 12.5–16.5)
IMMATURE GRANULOCYTES #: 0.14 E9/L
IMMATURE GRANULOCYTES %: 1.1 % (ref 0–5)
LYMPHOCYTES ABSOLUTE: 0.43 E9/L (ref 1.5–4)
LYMPHOCYTES RELATIVE PERCENT: 3.4 % (ref 20–42)
MCH RBC QN AUTO: 30.4 PG (ref 26–35)
MCHC RBC AUTO-ENTMCNC: 33 % (ref 32–34.5)
MCV RBC AUTO: 92.1 FL (ref 80–99.9)
MONOCYTES ABSOLUTE: 0.27 E9/L (ref 0.1–0.95)
MONOCYTES RELATIVE PERCENT: 2.1 % (ref 2–12)
NEUTROPHILS ABSOLUTE: 11.77 E9/L (ref 1.8–7.3)
NEUTROPHILS RELATIVE PERCENT: 93.2 % (ref 43–80)
PDW BLD-RTO: 14.6 FL (ref 11.5–15)
PLATELET # BLD: 231 E9/L (ref 130–450)
PMV BLD AUTO: 9.3 FL (ref 7–12)
PROCALCITONIN: 0.04 NG/ML (ref 0–0.08)
RBC # BLD: 4.96 E12/L (ref 3.8–5.8)
RBC # BLD: NORMAL 10*6/UL
SMEAR, RESPIRATORY: NORMAL
WBC # BLD: 12.6 E9/L (ref 4.5–11.5)

## 2018-11-14 PROCEDURE — 6370000000 HC RX 637 (ALT 250 FOR IP): Performed by: INTERNAL MEDICINE

## 2018-11-14 PROCEDURE — 2700000000 HC OXYGEN THERAPY PER DAY

## 2018-11-14 PROCEDURE — 2060000000 HC ICU INTERMEDIATE R&B

## 2018-11-14 PROCEDURE — 85025 COMPLETE CBC W/AUTO DIFF WBC: CPT

## 2018-11-14 PROCEDURE — 94640 AIRWAY INHALATION TREATMENT: CPT

## 2018-11-14 PROCEDURE — 84145 PROCALCITONIN (PCT): CPT

## 2018-11-14 PROCEDURE — 6360000002 HC RX W HCPCS: Performed by: INTERNAL MEDICINE

## 2018-11-14 PROCEDURE — 36415 COLL VENOUS BLD VENIPUNCTURE: CPT

## 2018-11-14 PROCEDURE — 2580000003 HC RX 258: Performed by: INTERNAL MEDICINE

## 2018-11-14 PROCEDURE — 6360000004 HC RX CONTRAST MEDICATION: Performed by: RADIOLOGY

## 2018-11-14 PROCEDURE — 71270 CT THORAX DX C-/C+: CPT

## 2018-11-14 RX ORDER — CLOTRIMAZOLE 10 MG/1
10 LOZENGE ORAL; TOPICAL
Status: DISCONTINUED | OUTPATIENT
Start: 2018-11-14 | End: 2018-11-16 | Stop reason: HOSPADM

## 2018-11-14 RX ORDER — BENZONATATE 100 MG/1
200 CAPSULE ORAL 3 TIMES DAILY
Status: DISCONTINUED | OUTPATIENT
Start: 2018-11-14 | End: 2018-11-16 | Stop reason: HOSPADM

## 2018-11-14 RX ORDER — METHYLPREDNISOLONE SODIUM SUCCINATE 40 MG/ML
40 INJECTION, POWDER, LYOPHILIZED, FOR SOLUTION INTRAMUSCULAR; INTRAVENOUS EVERY 12 HOURS
Status: DISCONTINUED | OUTPATIENT
Start: 2018-11-14 | End: 2018-11-16 | Stop reason: SDUPTHER

## 2018-11-14 RX ADMIN — BUDESONIDE 1000 MCG: 0.25 SUSPENSION RESPIRATORY (INHALATION) at 19:20

## 2018-11-14 RX ADMIN — FORMOTEROL FUMARATE DIHYDRATE 20 MCG: 20 SOLUTION RESPIRATORY (INHALATION) at 07:26

## 2018-11-14 RX ADMIN — BENZONATATE 100 MG: 100 CAPSULE ORAL at 08:06

## 2018-11-14 RX ADMIN — GUAIFENESIN AND CODEINE PHOSPHATE 5 ML: 10; 100 LIQUID ORAL at 03:58

## 2018-11-14 RX ADMIN — IOPAMIDOL 80 ML: 755 INJECTION, SOLUTION INTRAVENOUS at 14:00

## 2018-11-14 RX ADMIN — IBUPROFEN 200 MG: 200 TABLET, FILM COATED ORAL at 08:05

## 2018-11-14 RX ADMIN — BUDESONIDE 1000 MCG: 0.25 SUSPENSION RESPIRATORY (INHALATION) at 07:26

## 2018-11-14 RX ADMIN — IPRATROPIUM BROMIDE AND ALBUTEROL SULFATE 1 AMPULE: .5; 3 SOLUTION RESPIRATORY (INHALATION) at 07:26

## 2018-11-14 RX ADMIN — GUAIFENESIN AND CODEINE PHOSPHATE 5 ML: 10; 100 LIQUID ORAL at 14:34

## 2018-11-14 RX ADMIN — METHYLPREDNISOLONE SODIUM SUCCINATE 40 MG: 40 INJECTION, POWDER, LYOPHILIZED, FOR SOLUTION INTRAMUSCULAR; INTRAVENOUS at 03:57

## 2018-11-14 RX ADMIN — METHYLPREDNISOLONE SODIUM SUCCINATE 40 MG: 40 INJECTION, POWDER, LYOPHILIZED, FOR SOLUTION INTRAMUSCULAR; INTRAVENOUS at 23:44

## 2018-11-14 RX ADMIN — BENZONATATE 100 MG: 100 CAPSULE ORAL at 14:34

## 2018-11-14 RX ADMIN — CLOTRIMAZOLE 10 MG: 10 LOZENGE ORAL; TOPICAL at 18:16

## 2018-11-14 RX ADMIN — IPRATROPIUM BROMIDE AND ALBUTEROL SULFATE 1 AMPULE: .5; 3 SOLUTION RESPIRATORY (INHALATION) at 16:04

## 2018-11-14 RX ADMIN — FORMOTEROL FUMARATE DIHYDRATE 20 MCG: 20 SOLUTION RESPIRATORY (INHALATION) at 19:20

## 2018-11-14 RX ADMIN — IPRATROPIUM BROMIDE AND ALBUTEROL SULFATE 1 AMPULE: .5; 3 SOLUTION RESPIRATORY (INHALATION) at 04:38

## 2018-11-14 RX ADMIN — CEFTRIAXONE 1 G: 1 INJECTION, POWDER, FOR SOLUTION INTRAMUSCULAR; INTRAVENOUS at 18:34

## 2018-11-14 RX ADMIN — METHYLPREDNISOLONE SODIUM SUCCINATE 40 MG: 40 INJECTION, POWDER, LYOPHILIZED, FOR SOLUTION INTRAMUSCULAR; INTRAVENOUS at 11:51

## 2018-11-14 RX ADMIN — BENZONATATE 200 MG: 100 CAPSULE ORAL at 20:50

## 2018-11-14 RX ADMIN — IPRATROPIUM BROMIDE AND ALBUTEROL SULFATE 1 AMPULE: .5; 3 SOLUTION RESPIRATORY (INHALATION) at 11:21

## 2018-11-14 RX ADMIN — PANTOPRAZOLE SODIUM 40 MG: 40 TABLET, DELAYED RELEASE ORAL at 06:15

## 2018-11-14 RX ADMIN — IBUPROFEN 200 MG: 200 TABLET, FILM COATED ORAL at 16:28

## 2018-11-14 RX ADMIN — AZITHROMYCIN MONOHYDRATE 500 MG: 500 INJECTION, POWDER, LYOPHILIZED, FOR SOLUTION INTRAVENOUS at 17:18

## 2018-11-14 RX ADMIN — CLOTRIMAZOLE 10 MG: 10 LOZENGE ORAL; TOPICAL at 14:34

## 2018-11-14 RX ADMIN — CLOTRIMAZOLE 10 MG: 10 LOZENGE ORAL; TOPICAL at 22:26

## 2018-11-14 RX ADMIN — ENOXAPARIN SODIUM 40 MG: 40 INJECTION SUBCUTANEOUS at 16:28

## 2018-11-14 RX ADMIN — IPRATROPIUM BROMIDE AND ALBUTEROL SULFATE 1 AMPULE: .5; 3 SOLUTION RESPIRATORY (INHALATION) at 00:05

## 2018-11-14 ASSESSMENT — PAIN SCALES - GENERAL
PAINLEVEL_OUTOF10: 0

## 2018-11-14 NOTE — PROGRESS NOTES
Pulmonary Progress Note    Admit Date: 2018  Hospital day                               PCP: Britni Alberto MD    Chief Complaint (s):  Patient Active Problem List   Diagnosis    Left lower lobe pneumonia ([address]    COPD (chronic obstructive pulmonary disease) ([address]    Acute and chronic respiratory failure with hypoxia ([address]    Closed displaced fracture of second metatarsal bone of right foot with routine healing    COPD exacerbation ([address]    Acute respiratory failure with hypoxia ([address]    Bullous emphysema ([address]       Subjective:  · Awake and alert, still complaining of a cough. The cough remains productive. Vitals:  VITALS:  BP (!) 140/82   Pulse 72   Temp 97.8 °F (36.6 °C) (Oral)   Resp 18   Ht 6' 1\" (1.854 m)   Wt 201 lb 11.2 oz (91.5 kg)   SpO2 90%   BMI 26.61 kg/m²     24HR INTAKE/OUTPUT:    No intake or output data in the 24 hours ending 18 1552    24HR PULSE OXIMETRY RANGE:    SpO2  Av.5 %  Min: 90 %  Max: 92 %    Medications:  IV:      Scheduled Meds:   clotrimazole  10 mg Oral 5x Daily    benzonatate  200 mg Oral TID    methylPREDNISolone  40 mg Intravenous Q8H    ipratropium-albuterol  1 ampule Inhalation Q4H    ibuprofen  200 mg Oral TID WC    enoxaparin  40 mg Subcutaneous Daily    formoterol  20 mcg Nebulization BID    pantoprazole  40 mg Oral QAM AC    budesonide  1,000 mcg Nebulization BID       Diet:   DIET GENERAL;     EXAM:  General: No distress. Alert. Eyes: PERRL. No sclera icterus. No conjunctival injection. ENT: No discharge. Pharynx clear. Neck: Trachea midline. Normal thyroid. Resp: No accessory muscle use. No rales. Mild wheezing. Few rhonchi. CV: Regular rate. Regular rhythm. No murmur or rub. Abd: Non-tender. Non-distended. No masses. No organomegaly. Normal bowel sounds. Skin: Warm and dry. No nodule on exposed extremities. No rash on exposed extremities. Ext: No cyanosis, clubbing, edema  Lymph: No cervical LAD.  No

## 2018-11-14 NOTE — PROGRESS NOTES
Nebulization BID Johann Rubalcava MD   1,000 mcg at 18 0726     Scheduled Meds:   methylPREDNISolone  40 mg Intravenous Q8H    ipratropium-albuterol  1 ampule Inhalation Q4H    ibuprofen  200 mg Oral TID WC    enoxaparin  40 mg Subcutaneous Daily    formoterol  20 mcg Nebulization BID    benzonatate  100 mg Oral TID    pantoprazole  40 mg Oral QAM AC    budesonide  1,000 mcg Nebulization BID       Continuous Infusions:      Data:   Temperature:  Current - Temp: 98.7 °F (37.1 °C); Max - Temp  Av °F (36.7 °C)  Min: 97.6 °F (36.4 °C)  Max: 98.7 °F (37.1 °C)    Respiratory Rate : Resp  Av.5  Min: 18  Max: 20    Pulse Range: Pulse  Av.5  Min: 84  Max: 90    Blood Presuure Range:  Systolic (07MCB), BHJ:911 , Min:120 , RIW:727   ; Diastolic (70FXX), VBN:17, Min:73, Max:75      Pulse ox Range: SpO2  Av.5 %  Min: 90 %  Max: 94 %    Patient Vitals for the past 8 hrs:   BP Temp Temp src Pulse Resp SpO2   18 0800 130/75 98.7 °F (37.1 °C) Oral 89 18 92 %         Intake/Output Summary (Last 24 hours) at 18 1008  Last data filed at 18 1429   Gross per 24 hour   Intake            687.5 ml   Output                0 ml   Net            687.5 ml       I/O last 3 completed shifts: In: 867.5 [P.O.:480; I.V.:387.5]  Out: -     No intake/output data recorded.     Wt Readings from Last 3 Encounters:   18 201 lb 11.2 oz (91.5 kg)   18 210 lb (95.3 kg)   18 188 lb 11.2 oz (85.6 kg)       Labs:   CBC:   Lab Results   Component Value Date    WBC 10.6 2018    RBC 4.93 2018    HGB 15.0 2018    HCT 44.7 2018    MCV 90.7 2018    MCH 30.4 2018    MCHC 33.6 2018    RDW 13.9 2018     2018    MPV 9.9 2018     CBC with Differential:    Lab Results   Component Value Date    WBC 10.6 2018    RBC 4.93 2018    HGB 15.0 2018    HCT 44.7 2018     2018    MCV 90.7 2018    MCH 30.4 11/13/2018    MCHC 33.6 11/13/2018    RDW 13.9 11/13/2018    LYMPHOPCT 4.8 11/13/2018    MONOPCT 1.0 11/13/2018    BASOPCT 0.1 11/13/2018    MONOSABS 0.11 11/13/2018    LYMPHSABS 0.51 11/13/2018    EOSABS 0.00 11/13/2018    BASOSABS 0.01 11/13/2018     Hemoglobin/Hematocrit:    Lab Results   Component Value Date    HGB 15.0 11/13/2018    HCT 44.7 11/13/2018     CMP:    Lab Results   Component Value Date     11/13/2018    K 4.3 11/13/2018    K 4.0 02/24/2018     11/13/2018    CO2 25 11/13/2018    BUN 12 11/13/2018    CREATININE 0.6 11/13/2018    GFRAA >60 11/13/2018    LABGLOM >60 11/13/2018    GLUCOSE 148 11/13/2018    PROT 6.6 11/13/2018    LABALBU 4.0 11/13/2018    CALCIUM 9.7 11/13/2018    BILITOT 0.3 11/13/2018    ALKPHOS 73 11/13/2018    AST 12 11/13/2018    ALT 8 11/13/2018     BMP:    Lab Results   Component Value Date     11/13/2018    K 4.3 11/13/2018    K 4.0 02/24/2018     11/13/2018    CO2 25 11/13/2018    BUN 12 11/13/2018    LABALBU 4.0 11/13/2018    CREATININE 0.6 11/13/2018    CALCIUM 9.7 11/13/2018    GFRAA >60 11/13/2018    LABGLOM >60 11/13/2018    GLUCOSE 148 11/13/2018     Hepatic Function Panel:    Lab Results   Component Value Date    ALKPHOS 73 11/13/2018    ALT 8 11/13/2018    AST 12 11/13/2018    PROT 6.6 11/13/2018    BILITOT 0.3 11/13/2018    LABALBU 4.0 11/13/2018     Magnesium:    Lab Results   Component Value Date    MG 2.0 11/13/2018     Phosphorus:    Lab Results   Component Value Date    PHOS 2.9 11/12/2018     Uric Acid:    Lab Results   Component Value Date    LABURIC 4.5 11/13/2018     PT/INR:    Lab Results   Component Value Date    PROTIME 11.2 02/23/2018    INR 1.0 02/23/2018     Warfarin PT/INR:  No components found for: PTPATWAR, PTINRWAR  PTT:  No results found for: APTT, PTT[APTT}  Troponin:    Lab Results   Component Value Date    TROPONINI <0.01 11/12/2018    TROPONINI <0.01 11/12/2018     Last 3 Troponin:    Lab Results   Component Value Date

## 2018-11-15 PROBLEM — J43.9 PULMONARY EMPHYSEMA WITH FIBROSIS OF LUNG (HCC): Status: ACTIVE | Noted: 2018-11-15

## 2018-11-15 PROBLEM — I71.21 THORACIC ASCENDING AORTIC ANEURYSM: Status: ACTIVE | Noted: 2018-11-15

## 2018-11-15 PROBLEM — J18.9 RIGHT LOWER LOBE PNEUMONIA: Status: ACTIVE | Noted: 2018-11-15

## 2018-11-15 PROBLEM — J84.10 PULMONARY EMPHYSEMA WITH FIBROSIS OF LUNG (HCC): Status: ACTIVE | Noted: 2018-11-15

## 2018-11-15 LAB — URINE CULTURE, ROUTINE: NORMAL

## 2018-11-15 PROCEDURE — 2580000003 HC RX 258

## 2018-11-15 PROCEDURE — 6370000000 HC RX 637 (ALT 250 FOR IP): Performed by: INTERNAL MEDICINE

## 2018-11-15 PROCEDURE — 6360000002 HC RX W HCPCS: Performed by: INTERNAL MEDICINE

## 2018-11-15 PROCEDURE — 2700000000 HC OXYGEN THERAPY PER DAY

## 2018-11-15 PROCEDURE — 2580000003 HC RX 258: Performed by: INTERNAL MEDICINE

## 2018-11-15 PROCEDURE — 94640 AIRWAY INHALATION TREATMENT: CPT

## 2018-11-15 PROCEDURE — 2060000000 HC ICU INTERMEDIATE R&B

## 2018-11-15 RX ORDER — SODIUM CHLORIDE 0.9 % (FLUSH) 0.9 %
SYRINGE (ML) INJECTION
Status: COMPLETED
Start: 2018-11-15 | End: 2018-11-15

## 2018-11-15 RX ADMIN — IBUPROFEN 200 MG: 200 TABLET, FILM COATED ORAL at 08:54

## 2018-11-15 RX ADMIN — METHYLPREDNISOLONE SODIUM SUCCINATE 40 MG: 40 INJECTION, POWDER, LYOPHILIZED, FOR SOLUTION INTRAMUSCULAR; INTRAVENOUS at 12:08

## 2018-11-15 RX ADMIN — IPRATROPIUM BROMIDE AND ALBUTEROL SULFATE 1 AMPULE: .5; 3 SOLUTION RESPIRATORY (INHALATION) at 12:29

## 2018-11-15 RX ADMIN — CLOTRIMAZOLE 10 MG: 10 LOZENGE ORAL; TOPICAL at 15:04

## 2018-11-15 RX ADMIN — CLOTRIMAZOLE 10 MG: 10 LOZENGE ORAL; TOPICAL at 22:05

## 2018-11-15 RX ADMIN — FORMOTEROL FUMARATE DIHYDRATE 20 MCG: 20 SOLUTION RESPIRATORY (INHALATION) at 09:09

## 2018-11-15 RX ADMIN — FORMOTEROL FUMARATE DIHYDRATE 20 MCG: 20 SOLUTION RESPIRATORY (INHALATION) at 19:42

## 2018-11-15 RX ADMIN — Medication 10 ML: at 15:37

## 2018-11-15 RX ADMIN — AZITHROMYCIN MONOHYDRATE 500 MG: 500 INJECTION, POWDER, LYOPHILIZED, FOR SOLUTION INTRAVENOUS at 15:37

## 2018-11-15 RX ADMIN — IPRATROPIUM BROMIDE AND ALBUTEROL SULFATE 1 AMPULE: .5; 3 SOLUTION RESPIRATORY (INHALATION) at 23:19

## 2018-11-15 RX ADMIN — CLOTRIMAZOLE 10 MG: 10 LOZENGE ORAL; TOPICAL at 06:19

## 2018-11-15 RX ADMIN — IPRATROPIUM BROMIDE AND ALBUTEROL SULFATE 1 AMPULE: .5; 3 SOLUTION RESPIRATORY (INHALATION) at 04:28

## 2018-11-15 RX ADMIN — BUDESONIDE 1000 MCG: 0.25 SUSPENSION RESPIRATORY (INHALATION) at 19:42

## 2018-11-15 RX ADMIN — IBUPROFEN 200 MG: 200 TABLET, FILM COATED ORAL at 12:08

## 2018-11-15 RX ADMIN — BENZONATATE 200 MG: 100 CAPSULE ORAL at 21:08

## 2018-11-15 RX ADMIN — GUAIFENESIN AND CODEINE PHOSPHATE 5 ML: 10; 100 LIQUID ORAL at 08:54

## 2018-11-15 RX ADMIN — CLOTRIMAZOLE 10 MG: 10 LOZENGE ORAL; TOPICAL at 18:05

## 2018-11-15 RX ADMIN — BENZONATATE 200 MG: 100 CAPSULE ORAL at 08:54

## 2018-11-15 RX ADMIN — IBUPROFEN 200 MG: 200 TABLET, FILM COATED ORAL at 16:47

## 2018-11-15 RX ADMIN — IPRATROPIUM BROMIDE AND ALBUTEROL SULFATE 1 AMPULE: .5; 3 SOLUTION RESPIRATORY (INHALATION) at 15:46

## 2018-11-15 RX ADMIN — ENOXAPARIN SODIUM 40 MG: 40 INJECTION SUBCUTANEOUS at 15:02

## 2018-11-15 RX ADMIN — PANTOPRAZOLE SODIUM 40 MG: 40 TABLET, DELAYED RELEASE ORAL at 06:18

## 2018-11-15 RX ADMIN — CLOTRIMAZOLE 10 MG: 10 LOZENGE ORAL; TOPICAL at 11:09

## 2018-11-15 RX ADMIN — BUDESONIDE 1000 MCG: 0.25 SUSPENSION RESPIRATORY (INHALATION) at 09:10

## 2018-11-15 RX ADMIN — BENZONATATE 200 MG: 100 CAPSULE ORAL at 15:04

## 2018-11-15 RX ADMIN — CEFTRIAXONE 1 G: 1 INJECTION, POWDER, FOR SOLUTION INTRAMUSCULAR; INTRAVENOUS at 16:46

## 2018-11-15 RX ADMIN — METHYLPREDNISOLONE SODIUM SUCCINATE 40 MG: 40 INJECTION, POWDER, LYOPHILIZED, FOR SOLUTION INTRAMUSCULAR; INTRAVENOUS at 23:41

## 2018-11-15 ASSESSMENT — PAIN SCALES - GENERAL
PAINLEVEL_OUTOF10: 0

## 2018-11-15 NOTE — PROGRESS NOTES
negative. 11/15/18-patient laying quietly in bed states he feels much better. I was phoned regarding results of CT scan; patient was started on IV azithromycin and ceftriaxone. States he's having less productive cough, sinuses feel much more clear; color of productive mucus nearly clear. CT of chest done yesterday did show probable small right pneumonia. That and other findings discussed at length with patient; severe emphysema as well as 3.8 cm proximal ascending aortic aneurysm. ROS:  Negative to a 10 system review except that mentioned in the HPI. Objective:     PHYSICAL EXAM:    VS: /63   Pulse 80   Temp 99.2 °F (37.3 °C) (Axillary)   Resp 18   Ht 6' 1\" (1.854 m)   Wt 203 lb 6.4 oz (92.3 kg)   SpO2 93%   BMI 26.84 kg/m²   General appearance: Alert, Awake, Oriented times 3, no distress  Skin: Warm and dry ; no rashes  Head: Normocephalic. No masses, lesions or tenderness noted  Eyes: Conjunctivae pink, sclera white. PERRL,EOM-I  Ears: External ears normal  Nose/Sinuses: Nares normal. Septum midline. Mucosa normal. No drainage  Oropharynx: Oropharynx clear with no exudate seen-despite complaints  Neck: Supple. No jugular venous distension, lymphadenopathy or thyromegaly Trachea midline  Lungs: Mostly clear minimal wheezes at bases  Heart: S1 S2  Regular rate and rhythm. No rub, murmur or gallop  Abdomen: Soft, non-tender. BS normal. No masses, organomegaly; no rebound or guarding  Extremities: No edema, Peripheral pulses palpable  Musculoskeletal: Muscular strength appears intact. Neuro:  No focal motor defects ; II-XII grossly intact .  GLASER equally    TELEMETRY: REVIEWED--Telemetry: Sinus    ASSESSMENT:   Principal Problem:    Acute and chronic respiratory failure with hypoxia (HCC)  Active Problems:    COPD exacerbation (HCC)    Acute respiratory failure with hypoxia (HCC)    Bullous emphysema (HCC)    Right lower lobe pneumonia (HCC)    Pulmonary emphysema with fibrosis of lung (37.3 °C)    Respiratory Rate : Resp  Av.7  Min: 16  Max: 26    Pulse Range: Pulse  Av.8  Min: 74  Max: 97    Blood Presuure Range:  Systolic (99EQG), FFS:733 , Min:98 , RKQ:528   ; Diastolic (03RKF), YYZ:35, Min:63, Max:78      Pulse ox Range: SpO2  Av.8 %  Min: 90 %  Max: 94 %    Patient Vitals for the past 8 hrs:   BP Temp Temp src Pulse Resp SpO2   11/15/18 1145 109/63 99.2 °F (37.3 °C) Axillary 80 18 93 %   11/15/18 0911 - - - - 16 94 %   11/15/18 0845 127/71 97.5 °F (36.4 °C) Oral 97 16 94 %         Intake/Output Summary (Last 24 hours) at 11/15/18 1438  Last data filed at 11/15/18 1321   Gross per 24 hour   Intake             1020 ml   Output                0 ml   Net             1020 ml       I/O last 3 completed shifts: In: 480 [P.O.:480]  Out: -     I/O this shift:   In: 540 [P.O.:540]  Out: -     Wt Readings from Last 3 Encounters:   11/15/18 203 lb 6.4 oz (92.3 kg)   18 210 lb (95.3 kg)   18 188 lb 11.2 oz (85.6 kg)       Labs:   CBC:   Lab Results   Component Value Date    WBC 12.6 2018    RBC 4.96 2018    HGB 15.1 2018    HCT 45.7 2018    MCV 92.1 2018    MCH 30.4 2018    MCHC 33.0 2018    RDW 14.6 2018     2018    MPV 9.3 2018     CBC with Differential:    Lab Results   Component Value Date    WBC 12.6 2018    RBC 4.96 2018    HGB 15.1 2018    HCT 45.7 2018     2018    MCV 92.1 2018    MCH 30.4 2018    MCHC 33.0 2018    RDW 14.6 2018    LYMPHOPCT 3.4 2018    MONOPCT 2.1 2018    BASOPCT 0.2 2018    MONOSABS 0.27 2018    LYMPHSABS 0.43 2018    EOSABS 0.00 2018    BASOSABS 0.02 2018     Hemoglobin/Hematocrit:    Lab Results   Component Value Date    HGB 15.1 2018    HCT 45.7 2018     CMP:    Lab Results   Component Value Date     2018    K 4.3 2018    K 4.0 2018     centrilobular and subpleural emphysematous   change of the lungs with mild interstitial fibrosis or scarring. 2. Very subtle density abutting the left hemidiaphragm in the lateral   left lower lung is suspicious for early pneumonia, and there is a   similar, smaller focus in the lateral sulcus of the right lower lung. These could represent pneumonia, scar, or areas of atelectasis. Mass   lesions are felt to be unlikely. Correlation with clinical finding   should discriminate between these possibilities, and if pneumonia is   suspected, reevaluation following therapy to confirm clearing. 3. There is coronary artery calcification without evidence of cardiac   enlargement or decompensation. The proximal ascending aorta measures   up to 3.8 cm maximum diameter, which represents mild aneurysmal   change. 4. There is a small retrocardiac hiatus hernia. ALERT:  THIS IS AN ABNORMAL REPORT-very subtle patchy density just   above the diaphragm in both lower lungs, greater on the left, could be   areas of early pneumonia. These are small at this time. XR CHEST STANDARD (2 VW)   Final Result   Chronic obstructive pulmonary disease      Fibrosis seen in both lung bases      Right-sided pleural effusion with pleural reaction.       Unchanged from prior study with no evidence of airspace consolidation   or pulmonary venous congestion               Active Hospital Problems    Diagnosis    Right lower lobe pneumonia ([address] [J18.1]    Pulmonary emphysema with fibrosis of lung ([address] [J43.9, J84.10]    Acute respiratory failure with hypoxia (HCC) [J96.01]    Bullous emphysema ([address] [J43.9]    COPD exacerbation ([address] [J44.1]    Acute and chronic respiratory failure with hypoxia ([address] [J96.21]         Azar Aleman DO   2:38 PM     11/15/2018

## 2018-11-15 NOTE — PLAN OF CARE
Problem: Pain:  Goal: Pain level will decrease  Pain level will decrease     Outcome: Met This Shift      Problem: Falls - Risk of:  Goal: Absence of falls  Outcome: Met This Shift      Problem: Gas Exchange - Impaired:  Goal: Able to breathe comfortably  Able to breathe comfortably     Outcome: Met This Shift      Problem: Breathing Pattern - Ineffective:  Goal: Ability to achieve and maintain a regular respiratory rate will improve  Ability to achieve and maintain a regular respiratory rate will improve   Outcome: Met This Shift      Problem: Falls - Risk of:  Goal: Will remain free from falls  Will remain free from falls   Outcome: Met This Shift

## 2018-11-15 NOTE — PROGRESS NOTES
Pulmonary Progress Note    Admit Date: 2018  Hospital day                               PCP: Lucia Roche MD    Chief Complaint (s):  Patient Active Problem List   Diagnosis    Left lower lobe pneumonia ([address]    COPD (chronic obstructive pulmonary disease) ([address]    Acute and chronic respiratory failure with hypoxia ([address]    Closed displaced fracture of second metatarsal bone of right foot with routine healing    COPD exacerbation ([address]    Acute respiratory failure with hypoxia ([address]    Bullous emphysema ([address]    Right lower lobe pneumonia ([address]    Pulmonary emphysema with fibrosis of lung ([address]    Thoracic ascending aortic aneurysm ([address]       Subjective:  · Feeling much better, in good spirits. Vitals:  VITALS:  /69   Pulse 82   Temp 99 °F (37.2 °C) (Oral) Comment: o  Resp 17   Ht 6' 1\" (1.854 m)   Wt 203 lb 6.4 oz (92.3 kg)   SpO2 92%   BMI 26.84 kg/m²     24HR INTAKE/OUTPUT:      Intake/Output Summary (Last 24 hours) at 11/15/18 1707  Last data filed at 11/15/18 1321   Gross per 24 hour   Intake             1020 ml   Output                0 ml   Net             1020 ml       24HR PULSE OXIMETRY RANGE:    SpO2  Av.7 %  Min: 90 %  Max: 94 %    Medications:  IV:      Scheduled Meds:   clotrimazole  10 mg Oral 5x Daily    benzonatate  200 mg Oral TID    azithromycin  500 mg Intravenous Q24H    cefTRIAXone (ROCEPHIN) IV  1 g Intravenous Q24H    methylPREDNISolone  40 mg Intravenous Q12H    ipratropium-albuterol  1 ampule Inhalation Q4H    ibuprofen  200 mg Oral TID WC    enoxaparin  40 mg Subcutaneous Daily    formoterol  20 mcg Nebulization BID    pantoprazole  40 mg Oral QAM AC    budesonide  1,000 mcg Nebulization BID       Diet:   DIET GENERAL;     EXAM:  General: No distress. Alert. Eyes: PERRL. No sclera icterus. No conjunctival injection. ENT: No discharge. Pharynx clear. Neck: Trachea midline. Normal thyroid.   Resp: No accessory muscle

## 2018-11-15 NOTE — PROGRESS NOTES
[facility] Quality Flow/Interdisciplinary Rounds Progress Note        Quality Flow Rounds held on November 15, 2018    Disciplines Attending:  Bedside Nurse, ,  and Nursing Unit Leadership    Radha Williamson was admitted on 11/12/2018 10:08 AM    Anticipated Discharge Date:  Expected Discharge Date: 11/15/18    Disposition:    Sandro Score:  Sandro Scale Score: 21    Readmission Risk              Risk of Unplanned Readmission:        9           Discussed patient goal for the day, patient clinical progression, and barriers to discharge.   The following Goal(s) of the Day/Commitment(s) have been identified:    Wean steroids      Alexi Bazan  November 15, 2018

## 2018-11-16 VITALS
TEMPERATURE: 98 F | OXYGEN SATURATION: 93 % | BODY MASS INDEX: 27.29 KG/M2 | RESPIRATION RATE: 18 BRPM | WEIGHT: 205.9 LBS | HEIGHT: 73 IN | SYSTOLIC BLOOD PRESSURE: 101 MMHG | HEART RATE: 92 BPM | DIASTOLIC BLOOD PRESSURE: 64 MMHG

## 2018-11-16 LAB
ALBUMIN SERPL-MCNC: 3.8 G/DL (ref 3.5–5.2)
ALP BLD-CCNC: 71 U/L (ref 40–129)
ALT SERPL-CCNC: 12 U/L (ref 0–40)
ANION GAP SERPL CALCULATED.3IONS-SCNC: 10 MMOL/L (ref 7–16)
AST SERPL-CCNC: 14 U/L (ref 0–39)
BASOPHILS ABSOLUTE: 0.01 E9/L (ref 0–0.2)
BASOPHILS RELATIVE PERCENT: 0.1 % (ref 0–2)
BILIRUB SERPL-MCNC: <0.2 MG/DL (ref 0–1.2)
BUN BLDV-MCNC: 16 MG/DL (ref 8–23)
CALCIUM SERPL-MCNC: 9.4 MG/DL (ref 8.6–10.2)
CHLORIDE BLD-SCNC: 97 MMOL/L (ref 98–107)
CO2: 28 MMOL/L (ref 22–29)
CREAT SERPL-MCNC: 0.7 MG/DL (ref 0.7–1.2)
EOSINOPHILS ABSOLUTE: 0 E9/L (ref 0.05–0.5)
EOSINOPHILS RELATIVE PERCENT: 0 % (ref 0–6)
GFR AFRICAN AMERICAN: >60
GFR NON-AFRICAN AMERICAN: >60 ML/MIN/1.73
GLUCOSE BLD-MCNC: 131 MG/DL (ref 74–99)
HCT VFR BLD CALC: 46 % (ref 37–54)
HEMOGLOBIN: 15 G/DL (ref 12.5–16.5)
IMMATURE GRANULOCYTES #: 0.1 E9/L
IMMATURE GRANULOCYTES %: 1.2 % (ref 0–5)
LYMPHOCYTES ABSOLUTE: 0.51 E9/L (ref 1.5–4)
LYMPHOCYTES RELATIVE PERCENT: 5.9 % (ref 20–42)
MAGNESIUM: 1.9 MG/DL (ref 1.6–2.6)
MCH RBC QN AUTO: 30 PG (ref 26–35)
MCHC RBC AUTO-ENTMCNC: 32.6 % (ref 32–34.5)
MCV RBC AUTO: 92 FL (ref 80–99.9)
MONOCYTES ABSOLUTE: 0.23 E9/L (ref 0.1–0.95)
MONOCYTES RELATIVE PERCENT: 2.7 % (ref 2–12)
NEUTROPHILS ABSOLUTE: 7.73 E9/L (ref 1.8–7.3)
NEUTROPHILS RELATIVE PERCENT: 90.1 % (ref 43–80)
PDW BLD-RTO: 14.5 FL (ref 11.5–15)
PHOSPHORUS: 3.4 MG/DL (ref 2.5–4.5)
PLATELET # BLD: 224 E9/L (ref 130–450)
PMV BLD AUTO: 9.3 FL (ref 7–12)
POTASSIUM SERPL-SCNC: 4.3 MMOL/L (ref 3.5–5)
RBC # BLD: 5 E12/L (ref 3.8–5.8)
SODIUM BLD-SCNC: 135 MMOL/L (ref 132–146)
TOTAL PROTEIN: 6.2 G/DL (ref 6.4–8.3)
WBC # BLD: 8.6 E9/L (ref 4.5–11.5)

## 2018-11-16 PROCEDURE — 6360000002 HC RX W HCPCS: Performed by: INTERNAL MEDICINE

## 2018-11-16 PROCEDURE — 85025 COMPLETE CBC W/AUTO DIFF WBC: CPT

## 2018-11-16 PROCEDURE — 2700000000 HC OXYGEN THERAPY PER DAY

## 2018-11-16 PROCEDURE — 80053 COMPREHEN METABOLIC PANEL: CPT

## 2018-11-16 PROCEDURE — 6370000000 HC RX 637 (ALT 250 FOR IP): Performed by: INTERNAL MEDICINE

## 2018-11-16 PROCEDURE — 94640 AIRWAY INHALATION TREATMENT: CPT

## 2018-11-16 PROCEDURE — 36415 COLL VENOUS BLD VENIPUNCTURE: CPT

## 2018-11-16 PROCEDURE — 84100 ASSAY OF PHOSPHORUS: CPT

## 2018-11-16 PROCEDURE — 83735 ASSAY OF MAGNESIUM: CPT

## 2018-11-16 RX ORDER — SODIUM CHLORIDE 0.9 % (FLUSH) 0.9 %
10 SYRINGE (ML) INJECTION EVERY 12 HOURS SCHEDULED
Status: DISCONTINUED | OUTPATIENT
Start: 2018-11-16 | End: 2018-11-16 | Stop reason: HOSPADM

## 2018-11-16 RX ORDER — BENZONATATE 200 MG/1
200 CAPSULE ORAL 3 TIMES DAILY
Qty: 21 CAPSULE | Refills: 0 | Status: SHIPPED | OUTPATIENT
Start: 2018-11-16 | End: 2018-11-23

## 2018-11-16 RX ORDER — CLOTRIMAZOLE 10 MG/1
10 LOZENGE ORAL; TOPICAL
Qty: 50 TABLET | Refills: 0 | Status: SHIPPED | OUTPATIENT
Start: 2018-11-16 | End: 2018-11-26

## 2018-11-16 RX ORDER — PREDNISONE 20 MG/1
TABLET ORAL
Qty: 60 TABLET | Refills: 0 | Status: SHIPPED | OUTPATIENT
Start: 2018-11-16 | End: 2019-04-22 | Stop reason: ALTCHOICE

## 2018-11-16 RX ORDER — AZITHROMYCIN 250 MG/1
250 TABLET, FILM COATED ORAL DAILY
Status: DISCONTINUED | OUTPATIENT
Start: 2018-11-16 | End: 2018-11-16 | Stop reason: HOSPADM

## 2018-11-16 RX ORDER — CEFDINIR 300 MG/1
300 CAPSULE ORAL EVERY 12 HOURS SCHEDULED
Status: DISCONTINUED | OUTPATIENT
Start: 2018-11-16 | End: 2018-11-16 | Stop reason: HOSPADM

## 2018-11-16 RX ORDER — CEFDINIR 300 MG/1
300 CAPSULE ORAL EVERY 12 HOURS SCHEDULED
Qty: 20 CAPSULE | Refills: 0 | Status: SHIPPED | OUTPATIENT
Start: 2018-11-16 | End: 2018-11-26

## 2018-11-16 RX ORDER — PREDNISONE 20 MG/1
40 TABLET ORAL DAILY
Status: DISCONTINUED | OUTPATIENT
Start: 2018-11-16 | End: 2018-11-16 | Stop reason: HOSPADM

## 2018-11-16 RX ORDER — AZITHROMYCIN 250 MG/1
250 TABLET, FILM COATED ORAL DAILY
Qty: 5 TABLET | Refills: 0 | Status: SHIPPED | OUTPATIENT
Start: 2018-11-16 | End: 2018-11-21

## 2018-11-16 RX ORDER — SODIUM CHLORIDE 0.9 % (FLUSH) 0.9 %
10 SYRINGE (ML) INJECTION PRN
Status: DISCONTINUED | OUTPATIENT
Start: 2018-11-16 | End: 2018-11-16 | Stop reason: HOSPADM

## 2018-11-16 RX ADMIN — CLOTRIMAZOLE 10 MG: 10 LOZENGE ORAL; TOPICAL at 11:53

## 2018-11-16 RX ADMIN — METHYLPREDNISOLONE SODIUM SUCCINATE 40 MG: 40 INJECTION, POWDER, LYOPHILIZED, FOR SOLUTION INTRAMUSCULAR; INTRAVENOUS at 12:01

## 2018-11-16 RX ADMIN — AZITHROMYCIN 250 MG: 250 TABLET, FILM COATED ORAL at 15:17

## 2018-11-16 RX ADMIN — FORMOTEROL FUMARATE DIHYDRATE 20 MCG: 20 SOLUTION RESPIRATORY (INHALATION) at 09:31

## 2018-11-16 RX ADMIN — BENZONATATE 200 MG: 100 CAPSULE ORAL at 15:19

## 2018-11-16 RX ADMIN — PANTOPRAZOLE SODIUM 40 MG: 40 TABLET, DELAYED RELEASE ORAL at 06:09

## 2018-11-16 RX ADMIN — IPRATROPIUM BROMIDE AND ALBUTEROL SULFATE 1 AMPULE: .5; 3 SOLUTION RESPIRATORY (INHALATION) at 03:43

## 2018-11-16 RX ADMIN — IPRATROPIUM BROMIDE AND ALBUTEROL SULFATE 1 AMPULE: .5; 3 SOLUTION RESPIRATORY (INHALATION) at 12:21

## 2018-11-16 RX ADMIN — CLOTRIMAZOLE 10 MG: 10 LOZENGE ORAL; TOPICAL at 06:09

## 2018-11-16 RX ADMIN — GUAIFENESIN AND CODEINE PHOSPHATE 5 ML: 10; 100 LIQUID ORAL at 04:30

## 2018-11-16 RX ADMIN — IBUPROFEN 200 MG: 200 TABLET, FILM COATED ORAL at 08:49

## 2018-11-16 RX ADMIN — IBUPROFEN 200 MG: 200 TABLET, FILM COATED ORAL at 12:00

## 2018-11-16 RX ADMIN — IPRATROPIUM BROMIDE AND ALBUTEROL SULFATE 1 AMPULE: .5; 3 SOLUTION RESPIRATORY (INHALATION) at 16:58

## 2018-11-16 RX ADMIN — PREDNISONE 40 MG: 20 TABLET ORAL at 15:17

## 2018-11-16 RX ADMIN — BUDESONIDE 1000 MCG: 0.25 SUSPENSION RESPIRATORY (INHALATION) at 09:32

## 2018-11-16 RX ADMIN — BENZONATATE 200 MG: 100 CAPSULE ORAL at 08:48

## 2018-11-16 ASSESSMENT — PAIN DESCRIPTION - PAIN TYPE: TYPE: ACUTE PAIN

## 2018-11-16 ASSESSMENT — PAIN DESCRIPTION - DESCRIPTORS: DESCRIPTORS: ACHING

## 2018-11-16 ASSESSMENT — PAIN SCALES - GENERAL
PAINLEVEL_OUTOF10: 4

## 2018-11-16 ASSESSMENT — PAIN DESCRIPTION - ORIENTATION: ORIENTATION: RIGHT;LEFT

## 2018-11-16 ASSESSMENT — PAIN DESCRIPTION - LOCATION: LOCATION: RIB CAGE

## 2018-11-16 NOTE — PROGRESS NOTES
PROT 6.2 11/16/2018    BILITOT <0.2 11/16/2018    LABALBU 3.8 11/16/2018     Magnesium:    Lab Results   Component Value Date    MG 1.9 11/16/2018     Phosphorus:    Lab Results   Component Value Date    PHOS 3.4 11/16/2018     Uric Acid:    Lab Results   Component Value Date    LABURIC 4.5 11/13/2018     PT/INR:    Lab Results   Component Value Date    PROTIME 11.2 02/23/2018    INR 1.0 02/23/2018     Warfarin PT/INR:  No components found for: PTPATWAR, PTINRWAR  PTT:  No results found for: APTT, PTT[APTT}  Troponin:    Lab Results   Component Value Date    TROPONINI <0.01 11/12/2018    TROPONINI <0.01 11/12/2018     Last 3 Troponin:    Lab Results   Component Value Date    TROPONINI <0.01 11/12/2018    TROPONINI <0.01 11/12/2018    TROPONINI <0.01 11/12/2018     U/A:  No results found for: NITRITE, COLORU, PROTEINU, PHUR, LABCAST, WBCUA, RBCUA, MUCUS, TRICHOMONAS, YEAST, BACTERIA, CLARITYU, SPECGRAV, LEUKOCYTESUR, UROBILINOGEN, BILIRUBINUR, BLOODU, GLUCOSEU, AMORPHOUS  ABG:  No results found for: PH, PCO2, PO2, HCO3, BE, THGB, TCO2, O2SAT  HgBA1c:    Lab Results   Component Value Date    LABA1C 5.6 11/12/2018     FLP:    Lab Results   Component Value Date    TRIG 67 11/13/2018    HDL 71 11/13/2018    LDLCALC 67 11/13/2018    LABVLDL 13 11/13/2018     TSH:    Lab Results   Component Value Date    TSH 0.391 11/13/2018     VITAMIN B12: No components found for: B12  FOLATE:    Lab Results   Component Value Date    FOLATE 14.1 11/12/2018        CBC:  Recent Labs      11/14/18   1633  11/16/18   0440   WBC  12.6*  8.6   RBC  4.96  5.00   HGB  15.1  15.0   HCT  45.7  46.0   PLT  231  224   MCV  92.1  92.0   MCH  30.4  30.0   MCHC  33.0  32.6   RDW  14.6  14.5   LYMPHOPCT  3.4*  5.9*   MONOPCT  2.1  2.7   BASOPCT  0.2  0.1   MONOSABS  0.27  0.23   LYMPHSABS  0.43*  0.51*   EOSABS  0.00*  0.00*   BASOSABS  0.02  0.01          H & H :  Recent Labs      11/14/18   1633  11/16/18   0440   HGB  15.1  15.0       TSH:  No

## 2018-11-16 NOTE — PROGRESS NOTES
therapy to confirm clearing. 3. There is coronary artery calcification without evidence of cardiac   enlargement or decompensation. The proximal ascending aorta measures   up to 3.8 cm maximum diameter, which represents mild aneurysmal   change. 4. There is a small retrocardiac hiatus hernia. ALERT:  THIS IS AN ABNORMAL REPORT-very subtle patchy density just   above the diaphragm in both lower lungs, greater on the left, could be   areas of early pneumonia. These are small at this time. XR CHEST STANDARD (2 VW)   Final Result   Chronic obstructive pulmonary disease      Fibrosis seen in both lung bases      Right-sided pleural effusion with pleural reaction. Unchanged from prior study with no evidence of airspace consolidation   or pulmonary venous congestion           Assessment:  1. Acute exacerbation of chronic obstructive pulmonary disease with failed outpatient treatment. 2. Chronic hypoxia secondary to #1  3. Bibasilar fibrosis: Secondary to chronic obstructive pulmonary disease  4. Infection screens are negative  5. Minimal right lower lobe infiltrate suggesting pneumonia        Plan:  1. Adjust aerosols  2. Convert to oral steroids  3. Complete 10 days of antimicrobials given the advanced lung disease  4. Okay to discharge     Care reviewed with the staff and the patient's family as available. Please note that voice recognition technology was used in the preparation of this note and make therefore it may contain inadvertent transcription errors. Liza Finley M.D., F.C.C.P.     [facility], [address] [place] [address], [address] [place] [address]

## 2018-11-17 ENCOUNTER — CARE COORDINATION (OUTPATIENT)
Dept: CASE MANAGEMENT | Age: 68
End: 2018-11-17

## 2018-11-17 LAB
BLOOD CULTURE, ROUTINE: NORMAL
CULTURE, BLOOD 2: NORMAL

## 2018-11-24 NOTE — DISCHARGE SUMMARY
lower ribs and abdomen sore. He is also developing discomfort in his mouth and throat, likely thrush. Sodium 141 potassium 4.3 chloride 103 CO2 25 BUN 12 creatinine 0.6 magnesium 2.0 glucose 148 calcium 9.7 uric acid 4.5 protein 6.6 albumin 4.0 LDL 67 liver functions normal. Troponins negative. Phosphorus 2.9 pro calcitonin 0.03 proBNP 204. A1c 5.6 TSH 0.391. WBC 10.6 hemoglobin 15.0 platelets 235. X78 folic acid normal. D-dimer 207. Legionella, strep antigen, blood cultures, viral respiratory panel all negative.        Smear, Respiratory 11/12/2018  6:07 PM  - [address] [facility]   Group 6: <25 PMN's/LPF and <25 Epithelial cells/LPF   Few Polymorphonuclear leukocytes   Few Epithelial cells   Moderate Gram negative diplococci   Few Gram positive diplococci   Few gram positive rods Diphtheroid-like   Rare Gram positive cocci in clusters             11/14/18-patient laying quietly in bed, frustrated because he still has significant mucus production. States she still gets very short of breath with activity, simply walking. At home, he is on nocturnal oxygen but none during the daytime. Currently, 90% saturation on 3.5 L oxygen. Sputum cultures, blood cultures all remained negative.     11/15/18-patient laying quietly in bed states he feels much better. I was phoned regarding results of CT scan; patient was started on IV azithromycin and ceftriaxone. States he's having less productive cough, sinuses feel much more clear; color of productive mucus nearly clear. CT of chest done yesterday did show probable small right pneumonia. That and other findings discussed at length with patient; severe emphysema as well as 3.8 cm proximal ascending aortic aneurysm.      11/16/18-patient sitting on side of bed; states he feels better. No chest pain still with mild dyspnea but much improved. Appetite good. Productive cough virtually gone; productive mucus clear. Still has right lower chest discomfort with coughing.  He is hoping for discharge today. Sodium 135 potassium 4.3 chloride 97 CO2 28 BUN 16 creatinine 0.7 magnesium 1.9 glucose 131 calcium 9.4 phosphorus 3.4 protein 6.2 albumin 3.8 liver functions normal. Pro-calcitonin 0.04. Hemoglobin 15.0 WBC 8.6 platelets 698.     Med reconciliation completed  Prescriptions written  Follow-up Dr. Thong Torres one week  Follow-up pulmonary per their request  Methylprednisolone to be stopped  Changed to oral azithromycin and Omnicef               Condition at DISCHARGE : Stable and much improved    Discharge Instructions: Medications reviewed with patient    Consults:pulmonary/intensive care     Past Medical Hx :   Past Medical History:   Diagnosis Date    Acute and chronic respiratory failure with hypoxia ([address] 10/12/2017    Acute respiratory failure with hypoxia (HCC) 11/12/2018    Bullous emphysema ([address] 11/12/2018    Colon cancer screening     COPD (chronic obstructive pulmonary disease) (HCC)     Pulmonary emphysema with fibrosis of lung ([address] 11/15/2018    Right lower lobe pneumonia ([address] 11/15/2018    Thoracic ascending aortic aneurysm ([facility] 11/15/2018    Proximal ascending aorta; 3.8 cm; 11/15/18       Past Surgical Hx :  Past Surgical History:   Procedure Laterality Date    ABSCESS DRAINAGE  2006    X2 RECTAL ABSCESS    COLONOSCOPY  11/18/2013       Labs:   CBC:   Lab Results   Component Value Date    WBC 8.6 11/16/2018    RBC 5.00 11/16/2018    HGB 15.0 11/16/2018    HCT 46.0 11/16/2018    MCV 92.0 11/16/2018    MCH 30.0 11/16/2018    MCHC 32.6 11/16/2018    RDW 14.5 11/16/2018     11/16/2018    MPV 9.3 11/16/2018     CBC with Differential:    Lab Results   Component Value Date    WBC 8.6 11/16/2018    RBC 5.00 11/16/2018    HGB 15.0 11/16/2018    HCT 46.0 11/16/2018     11/16/2018    MCV 92.0 11/16/2018    MCH 30.0 11/16/2018    MCHC 32.6 11/16/2018    RDW 14.5 11/16/2018    LYMPHOPCT 5.9 11/16/2018    MONOPCT 2.7 11/16/2018    BASOPCT 0.1 11/16/2018    MONOSABS 0.23 Chest Standard (2 Vw)    Result Date: 2018  Patient MRN: [id number] : 1950 Age:  79 years Gender: Male Order Date: 2018 10:45 AM Exam: XR CHEST (2 VW) Number of Images: 2 views Indication:  Chest pain and shortness of breath Comparison: Prior study from 2018 is available Findings: Examination of the chest in PA and lateral views demonstrate hyperaeration of lungs suggesting of chronic obstructive pulmonary disease. There is a right-sided pleural effusion and/or pleural reaction which was seen before and appears to be unchanged. The remaining lung fields are clear for. The heart is normal in size and configuration. The trachea is in the midline. The mediastinum and both hemidiaphragms are normal. There is uncoiling atherosclerotic change of thoracic aorta. The bony thorax is intact. Chronic obstructive pulmonary disease Fibrosis seen in both lung bases Right-sided pleural effusion with pleural reaction. Unchanged from prior study with no evidence of airspace consolidation or pulmonary venous congestion     Ct Chest W Wo Contrast    Result Date: 2018  Patient Name:  Cici Mckinnon Patient MRN:  [id number] Patient :  1950 Patient Age:  79 years Patient Gender:  Male Order Date:2018 10:45 AM EXAM:  CT CHEST W WO CONTRAST NUMBER OF IMAGES:  46 INDICATION:   SHORTNESS OF BREATH, post abscess drainage of perirectal abscess COMPARISON: Two-view chest radiographic studies 2018, CT chest 2018 TECHNIQUE:  Axial images were obtained from the apices of the lungs through the upper abdomen initially without contrast, and were repeated following  the administration of 80 mL of Isovue-370. Sagittal and coronal reconstructions performed for aid in interpretation of the study. Low-dose CT  acquisition technique included one of following options; 1 . Automated exposure control, 2. Adjustment of MA and or KV according to patient's size or 3.  Use of iterative reevaluation following therapy to confirm clearing. 3. There is coronary artery calcification without evidence of cardiac enlargement or decompensation. The proximal ascending aorta measures up to 3.8 cm maximum diameter, which represents mild aneurysmal change. 4. There is a small retrocardiac hiatus hernia. ALERT:  THIS IS AN ABNORMAL REPORT-very subtle patchy density just above the diaphragm in both lower lungs, greater on the left, could be areas of early pneumonia. These are small at this time. Discharge Exam:  See progress note from today    Discharge Medication List as of 11/16/2018  3:09 PM      START taking these medications    Details   clotrimazole (MYCELEX) 10 MG nirav Take 1 tablet by mouth 5 times daily for 10 days, Disp-50 tablet, R-0Normal      azithromycin (ZITHROMAX) 250 MG tablet Take 1 tablet by mouth daily for 5 days, Disp-5 tablet, R-0Normal      benzonatate (TESSALON) 200 MG capsule Take 1 capsule by mouth 3 times daily for 7 days, Disp-21 capsule, R-0Normal      cefdinir (OMNICEF) 300 MG capsule Take 1 capsule by mouth every 12 hours for 10 days, Disp-20 capsule, R-0Normal         CONTINUE these medications which have CHANGED    Details   predniSONE (DELTASONE) 20 MG tablet 40 mg per day for 3 days then 30 mg per day for 3 days then 20 mg per day for 3 days then 10 mg per day for 3 days then stop, Disp-60 tablet, R-0Normal         CONTINUE these medications which have NOT CHANGED    Details   Fluticasone-Umeclidin-Vilant (TRELEGY ELLIPTA) 100-62.5-25 MCG/INH AEPB Inhale 1 puff into the lungs every morningHistorical Med      guaiFENesin-codeine (TUSSI-ORGANIDIN NR) 100-10 MG/5ML syrup Take 5 mLs by mouth every 4-6 hours as needed for Cough. .Historical Med      albuterol sulfate  (90 Base) MCG/ACT inhaler Inhale 2 puffs into the lungs every 6 hours as needed for Wheezing or Shortness of BreathHistorical Med      OXYGEN Inhale 3.5 L into the lungs nightly Historical Med

## 2018-12-04 ENCOUNTER — CARE COORDINATION (OUTPATIENT)
Dept: CASE MANAGEMENT | Age: 68
End: 2018-12-04

## 2019-01-02 ENCOUNTER — CARE COORDINATION (OUTPATIENT)
Dept: CASE MANAGEMENT | Age: 69
End: 2019-01-02

## 2019-01-25 ENCOUNTER — CARE COORDINATION (OUTPATIENT)
Dept: CARE COORDINATION | Age: 69
End: 2019-01-25

## 2019-02-07 ENCOUNTER — CARE COORDINATION (OUTPATIENT)
Dept: CASE MANAGEMENT | Age: 69
End: 2019-02-07

## 2019-02-13 NOTE — PLAN OF CARE
Problem: Pain:  Goal: Pain level will decrease  Pain level will decrease     Outcome: Met This Shift      Problem: Falls - Risk of:  Goal: Absence of falls  Outcome: Met This Shift      Problem: Gas Exchange - Impaired:  Goal: Able to breathe comfortably  Able to breathe comfortably     Outcome: Met This Shift      Problem: Breathing Pattern - Ineffective:  Goal: Ability to achieve and maintain a regular respiratory rate will improve  Ability to achieve and maintain a regular respiratory rate will improve   Outcome: Ongoing 195.1

## 2019-02-14 ENCOUNTER — CARE COORDINATION (OUTPATIENT)
Dept: CARE COORDINATION | Age: 69
End: 2019-02-14

## 2019-02-24 ENCOUNTER — CARE COORDINATION (OUTPATIENT)
Dept: CASE MANAGEMENT | Age: 69
End: 2019-02-24

## 2019-03-14 ENCOUNTER — HOSPITAL ENCOUNTER (OUTPATIENT)
Age: 69
Discharge: HOME | End: 2019-03-16

## 2019-03-14 LAB
ANION GAP SERPL CALCULATED.3IONS-SCNC: 10 MMOL/L (ref 7–16)
BUN BLDV-MCNC: 9 MG/DL (ref 8–23)
CALCIUM SERPL-MCNC: 9.5 MG/DL (ref 8.6–10.2)
CHLORIDE BLD-SCNC: 105 MMOL/L (ref 98–107)
CO2: 27 MMOL/L (ref 22–29)
CREAT SERPL-MCNC: 0.8 MG/DL (ref 0.7–1.2)
GFR AFRICAN AMERICAN: >60
GFR NON-AFRICAN AMERICAN: >60 ML/MIN/1.73
GLUCOSE BLD-MCNC: 80 MG/DL (ref 74–99)
HCT VFR BLD CALC: 51.9 % (ref 37–54)
HEMOGLOBIN: 17 G/DL (ref 12.5–16.5)
MCH RBC QN AUTO: 30.6 PG (ref 26–35)
MCHC RBC AUTO-ENTMCNC: 32.8 % (ref 32–34.5)
MCV RBC AUTO: 93.3 FL (ref 80–99.9)
PDW BLD-RTO: 13.6 FL (ref 11.5–15)
PLATELET # BLD: 210 E9/L (ref 130–450)
PMV BLD AUTO: 10.8 FL (ref 7–12)
POTASSIUM SERPL-SCNC: 5.2 MMOL/L (ref 3.5–5)
RBC # BLD: 5.56 E12/L (ref 3.8–5.8)
SODIUM BLD-SCNC: 142 MMOL/L (ref 132–146)
WBC # BLD: 4.7 E9/L (ref 4.5–11.5)

## 2019-03-14 PROCEDURE — 80048 BASIC METABOLIC PNL TOTAL CA: CPT

## 2019-03-14 PROCEDURE — 87081 CULTURE SCREEN ONLY: CPT

## 2019-03-14 PROCEDURE — 85027 COMPLETE CBC AUTOMATED: CPT

## 2019-03-14 PROCEDURE — 87088 URINE BACTERIA CULTURE: CPT

## 2019-03-16 LAB
MRSA CULTURE ONLY: NORMAL
URINE CULTURE, ROUTINE: NORMAL

## 2019-04-22 ENCOUNTER — HOSPITAL ENCOUNTER (INPATIENT)
Age: 69
LOS: 4 days | Discharge: HOME | DRG: 193 | End: 2019-04-26
Attending: EMERGENCY MEDICINE | Admitting: INTERNAL MEDICINE
Payer: MEDICARE

## 2019-04-22 ENCOUNTER — APPOINTMENT (OUTPATIENT)
Dept: GENERAL RADIOLOGY | Age: 69
DRG: 193 | End: 2019-04-22
Payer: MEDICARE

## 2019-04-22 ENCOUNTER — APPOINTMENT (OUTPATIENT)
Dept: CT IMAGING | Age: 69
DRG: 193 | End: 2019-04-22
Payer: MEDICARE

## 2019-04-22 DIAGNOSIS — R06.89 ACUTE RESPIRATORY INSUFFICIENCY: ICD-10-CM

## 2019-04-22 DIAGNOSIS — R19.7 DIARRHEA, UNSPECIFIED TYPE: ICD-10-CM

## 2019-04-22 DIAGNOSIS — J18.9 COMMUNITY ACQUIRED PNEUMONIA OF RIGHT LOWER LOBE OF LUNG: Primary | ICD-10-CM

## 2019-04-22 PROBLEM — J44.9 COPD (CHRONIC OBSTRUCTIVE PULMONARY DISEASE) (HCC): Status: ACTIVE | Noted: 2017-10-12

## 2019-04-22 LAB
ALBUMIN SERPL-MCNC: 4.2 G/DL (ref 3.5–5.2)
ALP BLD-CCNC: 87 U/L (ref 40–129)
ALT SERPL-CCNC: 8 U/L (ref 0–40)
ANION GAP SERPL CALCULATED.3IONS-SCNC: 14 MMOL/L (ref 7–16)
APTT: 29.8 SEC (ref 24.5–35.1)
AST SERPL-CCNC: 13 U/L (ref 0–39)
BACTERIA: ABNORMAL /HPF
BASOPHILS ABSOLUTE: 0.04 E9/L (ref 0–0.2)
BASOPHILS RELATIVE PERCENT: 0.4 % (ref 0–2)
BILIRUB SERPL-MCNC: 0.5 MG/DL (ref 0–1.2)
BILIRUBIN URINE: ABNORMAL
BLOOD, URINE: NEGATIVE
BUN BLDV-MCNC: 9 MG/DL (ref 8–23)
CALCIUM SERPL-MCNC: 10.2 MG/DL (ref 8.6–10.2)
CHLORIDE BLD-SCNC: 99 MMOL/L (ref 98–107)
CLARITY: CLEAR
CO2: 23 MMOL/L (ref 22–29)
COLOR: YELLOW
CREAT SERPL-MCNC: 0.7 MG/DL (ref 0.7–1.2)
EOSINOPHILS ABSOLUTE: 0.08 E9/L (ref 0.05–0.5)
EOSINOPHILS RELATIVE PERCENT: 0.7 % (ref 0–6)
EPITHELIAL CELLS, UA: ABNORMAL /HPF
FOLATE: 13.9 NG/ML (ref 4.8–24.2)
GFR AFRICAN AMERICAN: >60
GFR NON-AFRICAN AMERICAN: >60 ML/MIN/1.73
GLUCOSE BLD-MCNC: 94 MG/DL (ref 74–99)
GLUCOSE URINE: NEGATIVE MG/DL
HBA1C MFR BLD: 5.4 % (ref 4–5.6)
HCT VFR BLD CALC: 46.3 % (ref 37–54)
HEMOGLOBIN: 15.8 G/DL (ref 12.5–16.5)
IMMATURE GRANULOCYTES #: 0.04 E9/L
IMMATURE GRANULOCYTES %: 0.4 % (ref 0–5)
INFLUENZA A BY PCR: NOT DETECTED
INFLUENZA B BY PCR: NOT DETECTED
INR BLD: 1.1
KETONES, URINE: 15 MG/DL
LACTIC ACID: 1.3 MMOL/L (ref 0.5–2.2)
LEUKOCYTE ESTERASE, URINE: NEGATIVE
LIPASE: 28 U/L (ref 13–60)
LYMPHOCYTES ABSOLUTE: 1.22 E9/L (ref 1.5–4)
LYMPHOCYTES RELATIVE PERCENT: 11.1 % (ref 20–42)
MCH RBC QN AUTO: 30.3 PG (ref 26–35)
MCHC RBC AUTO-ENTMCNC: 34.1 % (ref 32–34.5)
MCV RBC AUTO: 88.9 FL (ref 80–99.9)
MONOCYTES ABSOLUTE: 0.71 E9/L (ref 0.1–0.95)
MONOCYTES RELATIVE PERCENT: 6.5 % (ref 2–12)
MUCUS: PRESENT
NEUTROPHILS ABSOLUTE: 8.86 E9/L (ref 1.8–7.3)
NEUTROPHILS RELATIVE PERCENT: 80.9 % (ref 43–80)
NITRITE, URINE: NEGATIVE
PDW BLD-RTO: 13.5 FL (ref 11.5–15)
PH UA: 5.5 (ref 5–9)
PLATELET # BLD: 283 E9/L (ref 130–450)
PMV BLD AUTO: 9.8 FL (ref 7–12)
POTASSIUM REFLEX MAGNESIUM: 4.4 MMOL/L (ref 3.5–5)
PRO-BNP: 193 PG/ML (ref 0–125)
PROCALCITONIN: 0.08 NG/ML (ref 0–0.08)
PROTEIN UA: NEGATIVE MG/DL
PROTHROMBIN TIME: 12.7 SEC (ref 9.3–12.4)
RBC # BLD: 5.21 E12/L (ref 3.8–5.8)
RBC UA: ABNORMAL /HPF (ref 0–2)
SEDIMENTATION RATE, ERYTHROCYTE: 21 MM/HR (ref 0–15)
SODIUM BLD-SCNC: 136 MMOL/L (ref 132–146)
SPECIFIC GRAVITY UA: 1.02 (ref 1–1.03)
TOTAL PROTEIN: 7.3 G/DL (ref 6.4–8.3)
TROPONIN: <0.01 NG/ML (ref 0–0.03)
UROBILINOGEN, URINE: 0.2 E.U./DL
VITAMIN B-12: 434 PG/ML (ref 211–946)
WBC # BLD: 11 E9/L (ref 4.5–11.5)
WBC UA: ABNORMAL /HPF (ref 0–5)

## 2019-04-22 PROCEDURE — 87798 DETECT AGENT NOS DNA AMP: CPT

## 2019-04-22 PROCEDURE — 84145 PROCALCITONIN (PCT): CPT

## 2019-04-22 PROCEDURE — 71045 X-RAY EXAM CHEST 1 VIEW: CPT

## 2019-04-22 PROCEDURE — 96374 THER/PROPH/DIAG INJ IV PUSH: CPT

## 2019-04-22 PROCEDURE — 80053 COMPREHEN METABOLIC PANEL: CPT

## 2019-04-22 PROCEDURE — 6370000000 HC RX 637 (ALT 250 FOR IP): Performed by: INTERNAL MEDICINE

## 2019-04-22 PROCEDURE — 36415 COLL VENOUS BLD VENIPUNCTURE: CPT

## 2019-04-22 PROCEDURE — 83036 HEMOGLOBIN GLYCOSYLATED A1C: CPT

## 2019-04-22 PROCEDURE — 93005 ELECTROCARDIOGRAM TRACING: CPT | Performed by: EMERGENCY MEDICINE

## 2019-04-22 PROCEDURE — 85730 THROMBOPLASTIN TIME PARTIAL: CPT

## 2019-04-22 PROCEDURE — 94664 DEMO&/EVAL PT USE INHALER: CPT

## 2019-04-22 PROCEDURE — 2060000000 HC ICU INTERMEDIATE R&B

## 2019-04-22 PROCEDURE — 85025 COMPLETE CBC W/AUTO DIFF WBC: CPT

## 2019-04-22 PROCEDURE — 83690 ASSAY OF LIPASE: CPT

## 2019-04-22 PROCEDURE — 2580000003 HC RX 258: Performed by: RADIOLOGY

## 2019-04-22 PROCEDURE — C9113 INJ PANTOPRAZOLE SODIUM, VIA: HCPCS | Performed by: EMERGENCY MEDICINE

## 2019-04-22 PROCEDURE — 6360000004 HC RX CONTRAST MEDICATION: Performed by: RADIOLOGY

## 2019-04-22 PROCEDURE — 87502 INFLUENZA DNA AMP PROBE: CPT

## 2019-04-22 PROCEDURE — 87486 CHLMYD PNEUM DNA AMP PROBE: CPT

## 2019-04-22 PROCEDURE — 6360000002 HC RX W HCPCS: Performed by: INTERNAL MEDICINE

## 2019-04-22 PROCEDURE — 84484 ASSAY OF TROPONIN QUANT: CPT

## 2019-04-22 PROCEDURE — 2580000003 HC RX 258: Performed by: EMERGENCY MEDICINE

## 2019-04-22 PROCEDURE — 2580000003 HC RX 258: Performed by: INTERNAL MEDICINE

## 2019-04-22 PROCEDURE — 94640 AIRWAY INHALATION TREATMENT: CPT

## 2019-04-22 PROCEDURE — 2580000003 HC RX 258

## 2019-04-22 PROCEDURE — 6370000000 HC RX 637 (ALT 250 FOR IP): Performed by: EMERGENCY MEDICINE

## 2019-04-22 PROCEDURE — 82607 VITAMIN B-12: CPT

## 2019-04-22 PROCEDURE — 87450 HC DIRECT STREP B ANTIGEN: CPT

## 2019-04-22 PROCEDURE — 82378 CARCINOEMBRYONIC ANTIGEN: CPT

## 2019-04-22 PROCEDURE — 85651 RBC SED RATE NONAUTOMATED: CPT

## 2019-04-22 PROCEDURE — 6360000002 HC RX W HCPCS: Performed by: EMERGENCY MEDICINE

## 2019-04-22 PROCEDURE — 86140 C-REACTIVE PROTEIN: CPT

## 2019-04-22 PROCEDURE — 81001 URINALYSIS AUTO W/SCOPE: CPT

## 2019-04-22 PROCEDURE — 96375 TX/PRO/DX INJ NEW DRUG ADDON: CPT

## 2019-04-22 PROCEDURE — 94761 N-INVAS EAR/PLS OXIMETRY MLT: CPT

## 2019-04-22 PROCEDURE — 87040 BLOOD CULTURE FOR BACTERIA: CPT

## 2019-04-22 PROCEDURE — 87581 M.PNEUMON DNA AMP PROBE: CPT

## 2019-04-22 PROCEDURE — 85610 PROTHROMBIN TIME: CPT

## 2019-04-22 PROCEDURE — 83605 ASSAY OF LACTIC ACID: CPT

## 2019-04-22 PROCEDURE — 87633 RESP VIRUS 12-25 TARGETS: CPT

## 2019-04-22 PROCEDURE — 83880 ASSAY OF NATRIURETIC PEPTIDE: CPT

## 2019-04-22 PROCEDURE — 82746 ASSAY OF FOLIC ACID SERUM: CPT

## 2019-04-22 PROCEDURE — 99285 EMERGENCY DEPT VISIT HI MDM: CPT

## 2019-04-22 PROCEDURE — 71275 CT ANGIOGRAPHY CHEST: CPT

## 2019-04-22 RX ORDER — PANTOPRAZOLE SODIUM 40 MG/1
40 TABLET, DELAYED RELEASE ORAL
Status: DISCONTINUED | OUTPATIENT
Start: 2019-04-23 | End: 2019-04-26 | Stop reason: HOSPADM

## 2019-04-22 RX ORDER — SODIUM CHLORIDE 0.9 % (FLUSH) 0.9 %
10 SYRINGE (ML) INJECTION ONCE
Status: COMPLETED | OUTPATIENT
Start: 2019-04-22 | End: 2019-04-22

## 2019-04-22 RX ORDER — FORMOTEROL FUMARATE 20 UG/2ML
20 SOLUTION RESPIRATORY (INHALATION) 2 TIMES DAILY
Status: DISCONTINUED | OUTPATIENT
Start: 2019-04-22 | End: 2019-04-26 | Stop reason: HOSPADM

## 2019-04-22 RX ORDER — PANTOPRAZOLE SODIUM 40 MG/10ML
40 INJECTION, POWDER, LYOPHILIZED, FOR SOLUTION INTRAVENOUS ONCE
Status: COMPLETED | OUTPATIENT
Start: 2019-04-22 | End: 2019-04-22

## 2019-04-22 RX ORDER — IPRATROPIUM BROMIDE AND ALBUTEROL SULFATE 2.5; .5 MG/3ML; MG/3ML
1 SOLUTION RESPIRATORY (INHALATION) ONCE
Status: COMPLETED | OUTPATIENT
Start: 2019-04-22 | End: 2019-04-22

## 2019-04-22 RX ORDER — 0.9 % SODIUM CHLORIDE 0.9 %
500 INTRAVENOUS SOLUTION INTRAVENOUS ONCE
Status: COMPLETED | OUTPATIENT
Start: 2019-04-22 | End: 2019-04-22

## 2019-04-22 RX ORDER — LEVOFLOXACIN 5 MG/ML
750 INJECTION, SOLUTION INTRAVENOUS ONCE
Status: COMPLETED | OUTPATIENT
Start: 2019-04-22 | End: 2019-04-22

## 2019-04-22 RX ORDER — ACETAMINOPHEN 325 MG/1
650 TABLET ORAL EVERY 4 HOURS PRN
Status: DISCONTINUED | OUTPATIENT
Start: 2019-04-22 | End: 2019-04-26 | Stop reason: HOSPADM

## 2019-04-22 RX ORDER — SODIUM CHLORIDE 9 MG/ML
INJECTION, SOLUTION INTRAVENOUS CONTINUOUS
Status: DISCONTINUED | OUTPATIENT
Start: 2019-04-22 | End: 2019-04-24

## 2019-04-22 RX ORDER — IPRATROPIUM BROMIDE AND ALBUTEROL SULFATE 2.5; .5 MG/3ML; MG/3ML
1 SOLUTION RESPIRATORY (INHALATION)
Status: DISCONTINUED | OUTPATIENT
Start: 2019-04-22 | End: 2019-04-23

## 2019-04-22 RX ORDER — SODIUM CHLORIDE 0.9 % (FLUSH) 0.9 %
SYRINGE (ML) INJECTION
Status: COMPLETED
Start: 2019-04-22 | End: 2019-04-22

## 2019-04-22 RX ORDER — IBUPROFEN 400 MG/1
400 TABLET ORAL
Status: DISCONTINUED | OUTPATIENT
Start: 2019-04-22 | End: 2019-04-26 | Stop reason: HOSPADM

## 2019-04-22 RX ORDER — ONDANSETRON 2 MG/ML
4 INJECTION INTRAMUSCULAR; INTRAVENOUS EVERY 6 HOURS PRN
Status: DISCONTINUED | OUTPATIENT
Start: 2019-04-22 | End: 2019-04-26 | Stop reason: HOSPADM

## 2019-04-22 RX ORDER — BUDESONIDE 0.25 MG/2ML
250 INHALANT ORAL 2 TIMES DAILY
Status: DISCONTINUED | OUTPATIENT
Start: 2019-04-22 | End: 2019-04-23

## 2019-04-22 RX ORDER — METHYLPREDNISOLONE SODIUM SUCCINATE 125 MG/2ML
80 INJECTION, POWDER, LYOPHILIZED, FOR SOLUTION INTRAMUSCULAR; INTRAVENOUS EVERY 8 HOURS
Status: DISCONTINUED | OUTPATIENT
Start: 2019-04-22 | End: 2019-04-24

## 2019-04-22 RX ADMIN — SODIUM CHLORIDE: 9 INJECTION, SOLUTION INTRAVENOUS at 15:38

## 2019-04-22 RX ADMIN — METHYLPREDNISOLONE SODIUM SUCCINATE 80 MG: 125 INJECTION, POWDER, LYOPHILIZED, FOR SOLUTION INTRAMUSCULAR; INTRAVENOUS at 23:08

## 2019-04-22 RX ADMIN — BUDESONIDE 250 MCG: 0.25 SUSPENSION RESPIRATORY (INHALATION) at 21:26

## 2019-04-22 RX ADMIN — IPRATROPIUM BROMIDE AND ALBUTEROL SULFATE 1 AMPULE: .5; 3 SOLUTION RESPIRATORY (INHALATION) at 21:25

## 2019-04-22 RX ADMIN — IPRATROPIUM BROMIDE AND ALBUTEROL SULFATE 1 AMPULE: .5; 3 SOLUTION RESPIRATORY (INHALATION) at 15:55

## 2019-04-22 RX ADMIN — Medication 10 ML: at 11:27

## 2019-04-22 RX ADMIN — Medication 10 ML: at 12:43

## 2019-04-22 RX ADMIN — FORMOTEROL FUMARATE DIHYDRATE 20 MCG: 20 SOLUTION RESPIRATORY (INHALATION) at 21:25

## 2019-04-22 RX ADMIN — IPRATROPIUM BROMIDE AND ALBUTEROL SULFATE 1 AMPULE: .5; 3 SOLUTION RESPIRATORY (INHALATION) at 11:12

## 2019-04-22 RX ADMIN — ENOXAPARIN SODIUM 40 MG: 40 INJECTION SUBCUTANEOUS at 15:39

## 2019-04-22 RX ADMIN — METHYLPREDNISOLONE SODIUM SUCCINATE 80 MG: 125 INJECTION, POWDER, LYOPHILIZED, FOR SOLUTION INTRAMUSCULAR; INTRAVENOUS at 15:39

## 2019-04-22 RX ADMIN — SODIUM CHLORIDE 500 ML: 9 INJECTION, SOLUTION INTRAVENOUS at 13:57

## 2019-04-22 RX ADMIN — LEVOFLOXACIN 750 MG: 5 INJECTION, SOLUTION INTRAVENOUS at 13:26

## 2019-04-22 RX ADMIN — IBUPROFEN 400 MG: 400 TABLET ORAL at 17:00

## 2019-04-22 RX ADMIN — ONDANSETRON 4 MG: 2 INJECTION INTRAMUSCULAR; INTRAVENOUS at 21:33

## 2019-04-22 RX ADMIN — PANTOPRAZOLE SODIUM 40 MG: 40 INJECTION, POWDER, FOR SOLUTION INTRAVENOUS at 11:27

## 2019-04-22 RX ADMIN — IOPAMIDOL 80 ML: 755 INJECTION, SOLUTION INTRAVENOUS at 12:43

## 2019-04-22 ASSESSMENT — ENCOUNTER SYMPTOMS
RHINORRHEA: 0
COUGH: 1
BLOOD IN STOOL: 0
SORE THROAT: 0
BACK PAIN: 0
ABDOMINAL PAIN: 1
SHORTNESS OF BREATH: 1
COLOR CHANGE: 0
CONSTIPATION: 0
VOMITING: 0
NAUSEA: 1
DIARRHEA: 1

## 2019-04-22 ASSESSMENT — PAIN SCALES - GENERAL
PAINLEVEL_OUTOF10: 9
PAINLEVEL_OUTOF10: 2

## 2019-04-22 NOTE — ED PROVIDER NOTES
for back pain and neck pain. Skin: Negative for color change, rash and wound. Neurological: Positive for weakness (generalized). Negative for dizziness, syncope, light-headedness and headaches. Psychiatric/Behavioral: Negative for confusion. Physical Exam   Constitutional: He is oriented to person, place, and time. He appears well-developed and well-nourished. No distress. HENT:   Head: Normocephalic and atraumatic. Right Ear: External ear normal.   Left Ear: External ear normal.   Nose: Nose normal.   Mouth/Throat: Oropharynx is clear and moist. No oropharyngeal exudate. Eyes: Pupils are equal, round, and reactive to light. Conjunctivae and EOM are normal. Right eye exhibits no discharge. Left eye exhibits no discharge. No scleral icterus. Neck: Neck supple. Cardiovascular: Normal rate, regular rhythm, normal heart sounds and intact distal pulses. Exam reveals no gallop and no friction rub. No murmur heard. Pulmonary/Chest: Effort normal and breath sounds normal. No stridor. No respiratory distress. He has no wheezes. He has no rales. He exhibits no tenderness. Abdominal: Soft. Bowel sounds are normal. He exhibits no distension and no mass. There is tenderness (mild epigastric pain). There is no rebound and no guarding. Musculoskeletal: He exhibits no edema or tenderness (no lumbar spinal pain to palpation). Neurological: He is alert and oriented to person, place, and time. He exhibits normal muscle tone. Skin: Skin is warm and dry. No rash noted. He is not diaphoretic. No erythema. No pallor. Wound over lumbar spine without drainage or erythema   Psychiatric: He has a normal mood and affect.  His behavior is normal. Judgment and thought content normal.       Procedures    MDM  Number of Diagnoses or Management Options  Acute respiratory insufficiency:   Community acquired pneumonia of right lower lobe of lung ([facility]):   Diarrhea, unspecified type:   Diagnosis management comments: Gravity, UA 1.020 1.005 - 1.030    Blood, Urine Negative Negative    pH, UA 5.5 5.0 - 9.0    Protein, UA Negative Negative mg/dL    Urobilinogen, Urine 0.2 <2.0 E.U./dL    Nitrite, Urine Negative Negative    Leukocyte Esterase, Urine Negative Negative   APTT   Result Value Ref Range    aPTT 29.8 24.5 - 35.1 sec   Protime-INR   Result Value Ref Range    Protime 12.7 (H) 9.3 - 12.4 sec    INR 1.1    Brain Natriuretic Peptide   Result Value Ref Range    Pro- (H) 0 - 125 pg/mL   EKG 12 Lead   Result Value Ref Range    Ventricular Rate 74 BPM    Atrial Rate 74 BPM    P-R Interval 170 ms    QRS Duration 98 ms    Q-T Interval 416 ms    QTc Calculation (Bazett) 461 ms    P Axis 37 degrees    R Axis 4 degrees    T Axis 38 degrees       RADIOLOGY:    All Radiology results interpreted by Radiologist unless otherwise noted. CTA PULMONARY W CONTRAST   Final Result   ALERT:  THIS IS AN ABNORMAL REPORT   1. No CT evidence of pulmonary embolism to the segmental level is   identified. More distal pulmonary emboli are not excluded. 2. Severe centrilobular emphysema with a suspected superimposed right   basilar infiltrate/pneumonia. Continued follow-up to complete   resolution is recommended. 3. Abnormally enlarged precarinal and right paratracheal and   subcarinal lymphadenopathy, findings are nonspecific and could reflect   infection, inflammatory/reactive lymphadenopathy, metastatic   disease/malignancy with lymphoma felt less likely. 4. Small amount left pleural fluid. 5. Mild to moderate sized hiatal hernia with mild degenerative changes   lower thoracic spine. 6. Vascular calcifications left anterior descending and left   circumflex coronary arteries. XR CHEST PORTABLE   Final Result   Advanced bullous emphysema with  patchy bibasilar infiltrates and   small pleural effusion likely pneumonia. EKG: This EKG is signed and interpreted by ED Physician.   Time:  1134   Rate: 74  Rhythm: disposition, IV medications, cardiac monitoring and continuous pulse oximetry    This patient has remained hemodynamically stable during their ED course. DISPOSITION:  Disposition: Admit to telemetry. Patient condition is stable. END OF PROVIDER NOTE.          Shamika Goldberg DO  Resident  04/22/19 [id number]

## 2019-04-22 NOTE — CARE COORDINATION
Social work / Discharge Planning:       Social work consult noted regarding discharge plan. Social work met with patient for initial assessment. He lives with his wife and has been using a walker since having recent back surgery. Patient has no history of HC or PATRICIA. He has home 02 from [facility] which is ordered for continuous use at 3.5 liters. Patient has orders for PT/OT evaluations. Social work will follow and assist with any discharge needs identified.    Electronically signed by MALVIN Whitley on 4/22/2019 at 3:13 PM

## 2019-04-23 PROBLEM — R04.2 COUGH WITH HEMOPTYSIS: Status: ACTIVE | Noted: 2019-04-23

## 2019-04-23 LAB
ANION GAP SERPL CALCULATED.3IONS-SCNC: 12 MMOL/L (ref 7–16)
BASOPHILS ABSOLUTE: 0 E9/L (ref 0–0.2)
BASOPHILS RELATIVE PERCENT: 0 % (ref 0–2)
BUN BLDV-MCNC: 9 MG/DL (ref 8–23)
C-REACTIVE PROTEIN: 7.4 MG/DL (ref 0–0.4)
CALCIUM SERPL-MCNC: 9.6 MG/DL (ref 8.6–10.2)
CEA: 4 NG/ML (ref 0–5.2)
CHLORIDE BLD-SCNC: 101 MMOL/L (ref 98–107)
CHOLESTEROL, TOTAL: 124 MG/DL (ref 0–199)
CO2: 22 MMOL/L (ref 22–29)
CREAT SERPL-MCNC: 0.6 MG/DL (ref 0.7–1.2)
EOSINOPHILS ABSOLUTE: 0 E9/L (ref 0.05–0.5)
EOSINOPHILS RELATIVE PERCENT: 0 % (ref 0–6)
FILM ARRAY ADENOVIRUS: NORMAL
FILM ARRAY BORDETELLA PERTUSSIS: NORMAL
FILM ARRAY CHLAMYDOPHILIA PNEUMONIAE: NORMAL
FILM ARRAY CORONAVIRUS 229E: NORMAL
FILM ARRAY CORONAVIRUS HKU1: NORMAL
FILM ARRAY CORONAVIRUS NL63: NORMAL
FILM ARRAY CORONAVIRUS OC43: NORMAL
FILM ARRAY INFLUENZA A VIRUS 09H1: NORMAL
FILM ARRAY INFLUENZA A VIRUS H1: NORMAL
FILM ARRAY INFLUENZA A VIRUS H3: NORMAL
FILM ARRAY INFLUENZA A VIRUS: NORMAL
FILM ARRAY INFLUENZA B: NORMAL
FILM ARRAY METAPNEUMOVIRUS: NORMAL
FILM ARRAY MYCOPLASMA PNEUMONIAE: NORMAL
FILM ARRAY PARAINFLUENZA VIRUS 1: NORMAL
FILM ARRAY PARAINFLUENZA VIRUS 2: NORMAL
FILM ARRAY PARAINFLUENZA VIRUS 3: NORMAL
FILM ARRAY PARAINFLUENZA VIRUS 4: NORMAL
FILM ARRAY RESPIRATORY SYNCITIAL VIRUS: NORMAL
FILM ARRAY RHINOVIRUS/ENTEROVIRUS: NORMAL
GFR AFRICAN AMERICAN: >60
GFR NON-AFRICAN AMERICAN: >60 ML/MIN/1.73
GLUCOSE BLD-MCNC: 185 MG/DL (ref 74–99)
HCT VFR BLD CALC: 42.5 % (ref 37–54)
HDLC SERPL-MCNC: 45 MG/DL
HEMOGLOBIN: 14.1 G/DL (ref 12.5–16.5)
IMMATURE GRANULOCYTES #: 0.03 E9/L
IMMATURE GRANULOCYTES %: 0.5 % (ref 0–5)
L. PNEUMOPHILA SEROGP 1 UR AG: NORMAL
LACTIC ACID, SEPSIS: 2.9 MMOL/L (ref 0.5–1.9)
LDL CHOLESTEROL CALCULATED: 69 MG/DL (ref 0–99)
LYMPHOCYTES ABSOLUTE: 0.23 E9/L (ref 1.5–4)
LYMPHOCYTES RELATIVE PERCENT: 3.7 % (ref 20–42)
MAGNESIUM: 1.9 MG/DL (ref 1.6–2.6)
MCH RBC QN AUTO: 29.7 PG (ref 26–35)
MCHC RBC AUTO-ENTMCNC: 33.2 % (ref 32–34.5)
MCV RBC AUTO: 89.5 FL (ref 80–99.9)
MONOCYTES ABSOLUTE: 0.04 E9/L (ref 0.1–0.95)
MONOCYTES RELATIVE PERCENT: 0.6 % (ref 2–12)
NEUTROPHILS ABSOLUTE: 5.9 E9/L (ref 1.8–7.3)
NEUTROPHILS RELATIVE PERCENT: 95.2 % (ref 43–80)
PDW BLD-RTO: 13.2 FL (ref 11.5–15)
PHOSPHORUS: 2.4 MG/DL (ref 2.5–4.5)
PLATELET # BLD: 255 E9/L (ref 130–450)
PMV BLD AUTO: 9.5 FL (ref 7–12)
POTASSIUM SERPL-SCNC: 4.1 MMOL/L (ref 3.5–5)
PRO-BNP: 123 PG/ML (ref 0–125)
RBC # BLD: 4.75 E12/L (ref 3.8–5.8)
RBC # BLD: NORMAL 10*6/UL
SODIUM BLD-SCNC: 135 MMOL/L (ref 132–146)
STREP PNEUMONIAE ANTIGEN, URINE: NORMAL
TRIGL SERPL-MCNC: 50 MG/DL (ref 0–149)
TSH SERPL DL<=0.05 MIU/L-ACNC: 0.27 UIU/ML (ref 0.27–4.2)
URIC ACID, SERUM: 3.4 MG/DL (ref 3.4–7)
VLDLC SERPL CALC-MCNC: 10 MG/DL
WBC # BLD: 6.2 E9/L (ref 4.5–11.5)

## 2019-04-23 PROCEDURE — 97161 PT EVAL LOW COMPLEX 20 MIN: CPT

## 2019-04-23 PROCEDURE — 6360000002 HC RX W HCPCS: Performed by: INTERNAL MEDICINE

## 2019-04-23 PROCEDURE — 83605 ASSAY OF LACTIC ACID: CPT

## 2019-04-23 PROCEDURE — 87206 SMEAR FLUORESCENT/ACID STAI: CPT

## 2019-04-23 PROCEDURE — 6370000000 HC RX 637 (ALT 250 FOR IP): Performed by: INTERNAL MEDICINE

## 2019-04-23 PROCEDURE — 94667 MNPJ CHEST WALL 1ST: CPT

## 2019-04-23 PROCEDURE — 2580000003 HC RX 258

## 2019-04-23 PROCEDURE — 83735 ASSAY OF MAGNESIUM: CPT

## 2019-04-23 PROCEDURE — 80048 BASIC METABOLIC PNL TOTAL CA: CPT

## 2019-04-23 PROCEDURE — 94669 MECHANICAL CHEST WALL OSCILL: CPT

## 2019-04-23 PROCEDURE — 84443 ASSAY THYROID STIM HORMONE: CPT

## 2019-04-23 PROCEDURE — 83880 ASSAY OF NATRIURETIC PEPTIDE: CPT

## 2019-04-23 PROCEDURE — 87070 CULTURE OTHR SPECIMN AEROBIC: CPT

## 2019-04-23 PROCEDURE — 85025 COMPLETE CBC W/AUTO DIFF WBC: CPT

## 2019-04-23 PROCEDURE — 2060000000 HC ICU INTERMEDIATE R&B

## 2019-04-23 PROCEDURE — 87450 HC DIRECT STREP B ANTIGEN: CPT

## 2019-04-23 PROCEDURE — 97165 OT EVAL LOW COMPLEX 30 MIN: CPT

## 2019-04-23 PROCEDURE — 2580000003 HC RX 258: Performed by: INTERNAL MEDICINE

## 2019-04-23 PROCEDURE — 80061 LIPID PANEL: CPT

## 2019-04-23 PROCEDURE — 84100 ASSAY OF PHOSPHORUS: CPT

## 2019-04-23 PROCEDURE — 94640 AIRWAY INHALATION TREATMENT: CPT

## 2019-04-23 PROCEDURE — 36415 COLL VENOUS BLD VENIPUNCTURE: CPT

## 2019-04-23 PROCEDURE — 84550 ASSAY OF BLOOD/URIC ACID: CPT

## 2019-04-23 PROCEDURE — 2700000000 HC OXYGEN THERAPY PER DAY

## 2019-04-23 RX ORDER — IPRATROPIUM BROMIDE AND ALBUTEROL SULFATE 2.5; .5 MG/3ML; MG/3ML
1 SOLUTION RESPIRATORY (INHALATION) EVERY 4 HOURS
Status: DISCONTINUED | OUTPATIENT
Start: 2019-04-23 | End: 2019-04-26 | Stop reason: HOSPADM

## 2019-04-23 RX ORDER — BUDESONIDE 0.25 MG/2ML
1000 INHALANT ORAL 2 TIMES DAILY
Status: DISCONTINUED | OUTPATIENT
Start: 2019-04-23 | End: 2019-04-26 | Stop reason: HOSPADM

## 2019-04-23 RX ORDER — ZOLPIDEM TARTRATE 5 MG/1
5 TABLET ORAL NIGHTLY
Status: DISCONTINUED | OUTPATIENT
Start: 2019-04-23 | End: 2019-04-26 | Stop reason: HOSPADM

## 2019-04-23 RX ORDER — SODIUM CHLORIDE 9 MG/ML
INJECTION, SOLUTION INTRAVENOUS ONCE
Status: COMPLETED | OUTPATIENT
Start: 2019-04-23 | End: 2019-04-23

## 2019-04-23 RX ORDER — LEVOFLOXACIN 5 MG/ML
500 INJECTION, SOLUTION INTRAVENOUS EVERY 24 HOURS
Status: DISCONTINUED | OUTPATIENT
Start: 2019-04-23 | End: 2019-04-26 | Stop reason: HOSPADM

## 2019-04-23 RX ADMIN — LEVOFLOXACIN 500 MG: 5 INJECTION, SOLUTION INTRAVENOUS at 14:09

## 2019-04-23 RX ADMIN — IPRATROPIUM BROMIDE AND ALBUTEROL SULFATE 1 AMPULE: .5; 3 SOLUTION RESPIRATORY (INHALATION) at 07:51

## 2019-04-23 RX ADMIN — METHYLPREDNISOLONE SODIUM SUCCINATE 80 MG: 125 INJECTION, POWDER, LYOPHILIZED, FOR SOLUTION INTRAMUSCULAR; INTRAVENOUS at 23:17

## 2019-04-23 RX ADMIN — ACETAMINOPHEN 650 MG: 325 TABLET ORAL at 03:48

## 2019-04-23 RX ADMIN — IPRATROPIUM BROMIDE AND ALBUTEROL SULFATE 1 AMPULE: .5; 3 SOLUTION RESPIRATORY (INHALATION) at 13:09

## 2019-04-23 RX ADMIN — ENOXAPARIN SODIUM 40 MG: 40 INJECTION SUBCUTANEOUS at 09:18

## 2019-04-23 RX ADMIN — ZOLPIDEM TARTRATE 5 MG: 5 TABLET ORAL at 23:17

## 2019-04-23 RX ADMIN — IBUPROFEN 400 MG: 400 TABLET ORAL at 14:09

## 2019-04-23 RX ADMIN — PANTOPRAZOLE SODIUM 40 MG: 40 TABLET, DELAYED RELEASE ORAL at 06:47

## 2019-04-23 RX ADMIN — BUDESONIDE 250 MCG: 0.25 SUSPENSION RESPIRATORY (INHALATION) at 07:52

## 2019-04-23 RX ADMIN — SODIUM CHLORIDE: 9 INJECTION, SOLUTION INTRAVENOUS at 14:10

## 2019-04-23 RX ADMIN — SODIUM CHLORIDE: 9 INJECTION, SOLUTION INTRAVENOUS at 17:36

## 2019-04-23 RX ADMIN — METHYLPREDNISOLONE SODIUM SUCCINATE 80 MG: 125 INJECTION, POWDER, LYOPHILIZED, FOR SOLUTION INTRAMUSCULAR; INTRAVENOUS at 06:47

## 2019-04-23 RX ADMIN — SODIUM CHLORIDE: 9 INJECTION, SOLUTION INTRAVENOUS at 20:33

## 2019-04-23 RX ADMIN — IPRATROPIUM BROMIDE AND ALBUTEROL SULFATE 1 AMPULE: .5; 3 SOLUTION RESPIRATORY (INHALATION) at 17:36

## 2019-04-23 RX ADMIN — IBUPROFEN 400 MG: 400 TABLET ORAL at 09:19

## 2019-04-23 RX ADMIN — FORMOTEROL FUMARATE DIHYDRATE 20 MCG: 20 SOLUTION RESPIRATORY (INHALATION) at 07:51

## 2019-04-23 RX ADMIN — FORMOTEROL FUMARATE DIHYDRATE 20 MCG: 20 SOLUTION RESPIRATORY (INHALATION) at 21:48

## 2019-04-23 RX ADMIN — METHYLPREDNISOLONE SODIUM SUCCINATE 80 MG: 125 INJECTION, POWDER, LYOPHILIZED, FOR SOLUTION INTRAMUSCULAR; INTRAVENOUS at 15:27

## 2019-04-23 RX ADMIN — BUDESONIDE 1000 MCG: 0.25 SUSPENSION RESPIRATORY (INHALATION) at 21:48

## 2019-04-23 RX ADMIN — IPRATROPIUM BROMIDE AND ALBUTEROL SULFATE 1 AMPULE: .5; 3 SOLUTION RESPIRATORY (INHALATION) at 21:41

## 2019-04-23 RX ADMIN — IBUPROFEN 400 MG: 400 TABLET ORAL at 18:00

## 2019-04-23 RX ADMIN — WATER 10 ML: 1 INJECTION INTRAMUSCULAR; INTRAVENOUS; SUBCUTANEOUS at 15:27

## 2019-04-23 ASSESSMENT — PAIN SCALES - GENERAL
PAINLEVEL_OUTOF10: 0
PAINLEVEL_OUTOF10: 3
PAINLEVEL_OUTOF10: 0

## 2019-04-23 NOTE — PROGRESS NOTES
Call placed to Dr. Laura Scott regarding patient request for sleep aid, Dr. Laura Scott returned call-see orders.   Electronically signed by Hazel Boles RN on 4/23/2019 at 1:35 PM

## 2019-04-23 NOTE — CONSULTS
throat. · Cardiovascular: No chest pain, dyspnea on exertion, or palpitations. · Respiratory: See above  · Gastrointestinal: No abdominal pain, nausea or emesis. No diarrhea or rectal bleeding or melena. No change in bowel habits. · Genitourinary: No dysuria, urinary frequency, or incontinence. No hematuria. · Musculoskeletal: No gait disturbance, weakness or joint complaints. · Integumentary: No rash or pruritis. No abnormal pigmentation, hair or nail changes. · Neurological: No headache, diplopia, dizziness, tremor, change in muscle strength, numbness or tingling. No change in gait, balance, coordination, mood, affect, memory, mentation, behavior. · Psychiatric: No anxiety or depression. · Endocrine: No temperature intolerance, excessive thirst, fluid intake, urinary frequency, excessive appetite, or recent weight change. · Hematologic/Lymphatic: No abnormal bruising or bleeding, blood clots or swollen lymph nodes. No anemia, fever, chills, night sweats, or swollen glands. · Allergic/Immunologic: No seasonal or perenial allergies. No history of hives or atopic dermatitis.     Social History:  · Alcohol:  No  · Tobacco:   Quit 2014 110py  · Employment:  no silica or asbestos exposure  · Family:  No family history of lung disease    Medications:   sodium chloride 100 mL/hr at 04/22/19 1538      ibuprofen  400 mg Oral TID WC    budesonide  250 mcg Nebulization BID    formoterol  20 mcg Nebulization BID    methylPREDNISolone  80 mg Intravenous Q8H    enoxaparin  40 mg Subcutaneous Daily    pantoprazole  40 mg Oral QAM AC    ipratropium-albuterol  1 ampule Inhalation Q4H WA       Vitals:  Tmax:  VITALS:  /73   Pulse 96   Temp 97.3 °F (36.3 °C) (Oral)   Resp 18   Ht 6' 1\" (1.854 m)   Wt 198 lb 4.8 oz (89.9 kg)   SpO2 93%   BMI 26.16 kg/m²   24HR INTAKE/OUTPUT:      Intake/Output Summary (Last 24 hours) at 4/23/2019 0956  Last data filed at 4/23/2019 0819  Gross per 24 hour   Intake 3708 ml   Output 950 ml   Net 901 ml     CURRENT PULSE OXIMETRY:  SpO2: 93 %  24HR PULSE OXIMETRY RANGE:  SpO2  Av.7 %  Min: 84 %  Max: 95 %    EXAM:  General: No distress. Alert. Eyes: PERRL. No sclera icterus. No conjunctival injection. ENT: No discharge. Pharynx clear. Neck: Trachea midline. Normal thyroid. No jvd, no hjr. Resp: No wheezing. No accessory muscle use. No rales. No rhonchi. CV: Regular rate. Regular rhythm. No murmur No rub. Abd: Non-tender. Non-distended. No masses. No organmegaly. Normal bowel sounds. Skin: Warm and dry. No nodule on exposed extremities. No rash on exposed extremities. Lymph: No cervical LAD. No supraclavicular LAD. Ext: No joint deformity. No clubbing. No cyanosis. No edema  Neuro: Awake. Follows commands. Positive pupils/gag/corneals. Normal pain response. Lab Results:  CBC:   Recent Labs     19  1108 19  0240   WBC 11.0 6.2   HGB 15.8 14.1   HCT 46.3 42.5   MCV 88.9 89.5    255       BMP:  Recent Labs     19  1108 19  0240    135   K 4.4 4.1   CL 99 101   CO2 23 22   PHOS  --  2.4*   BUN 9 9   CREATININE 0.7 0.6*    ALB:3,BILIDIR:3,BILITOT:3,ALKPHOS:3)@    PT/INR:   Recent Labs     19  110   PROTIME 12.7*   INR 1.1       Cultures:  Sputum: not available  Blood: not available    ABG:   No results for input(s): PH, PO2, PCO2, HCO3, BE, O2SAT, METHB, O2HB, COHB, O2CON, HHB, THB in the last 72 hours. Films:     CTA PULMONARY W CONTRAST   Final Result   ALERT:  THIS IS AN ABNORMAL REPORT   1. No CT evidence of pulmonary embolism to the segmental level is   identified. More distal pulmonary emboli are not excluded. 2. Severe centrilobular emphysema with a suspected superimposed right   basilar infiltrate/pneumonia. Continued follow-up to complete   resolution is recommended.    3. Abnormally enlarged precarinal and right paratracheal and   subcarinal lymphadenopathy, findings are nonspecific and could reflect infection, inflammatory/reactive lymphadenopathy, metastatic   disease/malignancy with lymphoma felt less likely. 4. Small amount left pleural fluid. 5. Mild to moderate sized hiatal hernia with mild degenerative changes   lower thoracic spine. 6. Vascular calcifications left anterior descending and left   circumflex coronary arteries. XR CHEST PORTABLE   Final Result   Advanced bullous emphysema with  patchy bibasilar infiltrates and   small pleural effusion likely pneumonia. .        Assessment:  1. Pneumonia, right lower lobe, community-acquired. The patient's surgical standpoint hospital was only 24 hours. As a result, nosocomial pathogens are thought to be less likely. There is no evidence of swallowing dysfunction to suggest that this may be aspiration in nature. 2. COPD, GOLD class 4  3. Hemoptysis: Secondary to #1      Plan:  1. Levaquin 500 mg daily for now  2. Aerosol therapies  3. Check sputum culture and sensitivity, Gram stain      Thanks for letting us see this patient in consultation. Please contact us with any questions. Office [phone] or after hours through [employer], x [phone]. Please note that voice recognition technology was used in the preparation of this note and make therefore it may contain inadvertent transcription errors    Eron Stephens M.D., F.C.C.P.     [facility], [address] [place] [address], [address] [facility]

## 2019-04-23 NOTE — H&P
intervertebral disc spaces are well-preserved. There is normal  sagittal alignment of the visualized spine. No lytic or blastic bony  lesions. . Mild degenerative changes lower thoracic spine. Right lun. A suspected developing infiltrate/pneumonia superimposed in the  right lung base. 2. No discrete noncalcified pulmonary nodule or spiculated mass is  identifiable at this time. Severe centrilobular emphysema. No supraclavicular, axillary,  lymphadenopathy is identified. Right paratracheal lymphadenopathy  measuring approximately 1.0 x 1.7 cm. Precarinal lymphadenopathy  measured at approximately 1.0 by approximately 1.7 cm. Subcarinal  lymphadenopathy measuring approximately 1.6 x 1.6 cm.     Left lun. No focal area of infiltrate/pneumonia, pneumothorax or pleural  effusion is identified. 2. No discrete noncalcified pulmonary nodule or spiculated mass is  seen. 3. Small amount left pleural fluid. 4. Posteriorly dependent atelectasis in the left lung base.      Impression:       ALERT:  THIS IS AN ABNORMAL REPORT  1. No CT evidence of pulmonary embolism to the segmental level is  identified. More distal pulmonary emboli are not excluded. 2. Severe centrilobular emphysema with a suspected superimposed right  basilar infiltrate/pneumonia. Continued follow-up to complete  resolution is recommended. 3. Abnormally enlarged precarinal and right paratracheal and  subcarinal lymphadenopathy, findings are nonspecific and could reflect  infection, inflammatory/reactive lymphadenopathy, metastatic  disease/malignancy with lymphoma felt less likely. 4. Small amount left pleural fluid. 5. Mild to moderate sized hiatal hernia with mild degenerative changes  lower thoracic spine. 6. Vascular calcifications left anterior descending and left  circumflex coronary arteries.        --He has known COPD, currently on 3.5 L oxygen nightly at home  --Denies any rheumatic fever scarlet fever or polio diphtheria cancer Fluticasone-Umeclidin-Vilant (TRELEGY ELLIPTA) 100-62.5-25 MCG/INH AEPB, Inhale 1 puff into the lungs every morning  albuterol sulfate  (90 Base) MCG/ACT inhaler, Inhale 2 puffs into the lungs every 6 hours as needed for Wheezing or Shortness of Breath  OXYGEN, Inhale 3.5 L into the lungs continuous prn   etodolac (LODINE) 500 MG tablet, Take 500 mg by mouth every morning     Allergies:    Patient has no known allergies. Social History:    reports that he quit smoking about 4 years ago. His smoking use included cigarettes. He started smoking about 50 years ago. He has a 92.00 pack-year smoking history. He has never used smokeless tobacco. He reports that he does not drink alcohol or use drugs. Family History:   family history includes Other in his father and mother. REVIEW OF SYSTEMS:  As above in the HPI, otherwise negative    PHYSICAL EXAM:    VS: /73   Pulse 96   Temp 97.3 °F (36.3 °C) (Oral)   Resp 18   Ht 6' 1\" (1.854 m)   Wt 198 lb 4.8 oz (89.9 kg)   SpO2 93%   BMI 26.16 kg/m²     General appearance: Alert, Awake, Oriented times 3, no distress  Skin: Warm and dry ; no rashes  Head: Normocephalic. No masses, lesions or tenderness noted  Eyes: Conjunctivae pink, sclera white. PERRL,EOM-I  Ears: External ears normal  Nose/Sinuses: Nares normal. Septum midline. Mucosa normal. No drainage  Oropharynx: Oropharynx clear with no exudate seen  Neck: Supple. No jugular venous distension, lymphadenopathy or thyromegaly Trachea midline  Lungs: Mostly clear rare rhonchi clinical wheeze  Heart: S1 S2  Regular rate and rhythm. No rub, murmur or gallop  Abdomen: Soft, non-tender. BS normal. No masses, organomegaly; no rebound or guarding  Extremities: No edema, Peripheral pulses palpable  Musculoskeletal: Muscular strength appears intact. Neuro:  No focal motor defects ; II-XII grossly intact .  GLASER equally  Breast: deferred  Rectal: deferred  Genitalia:  deferred    LABS:  CBC:   Lab Results

## 2019-04-23 NOTE — PROGRESS NOTES
[facility] Quality Flow/Interdisciplinary Rounds Progress Note        Quality Flow Rounds held on April 23, 2019    Disciplines Attending:  Bedside Nurse, ,  and Nursing Unit Leadership    Lm Bella was admitted on 4/22/2019 10:45 AM    Anticipated Discharge Date:  Expected Discharge Date: 04/25/19    Disposition:    Sandro Score:  Sandro Scale Score: 22    Readmission Risk              Risk of Unplanned Readmission:        8           Discussed patient goal for the day, patient clinical progression, and barriers to discharge. The following Goal(s) of the Day/Commitment(s) have been identified: Wean IV steroids, check Pulmonary plan.       Suzie Gil  April 23, 2019

## 2019-04-23 NOTE — PLAN OF CARE
Problem: Airway Clearance - Ineffective:  Goal: Ability to maintain a clear airway will improve  Description  Ability to maintain a clear airway will improve  Outcome: Met This Shift     Problem: Fluid Volume - Deficit:  Goal: Achieves intake and output within specified parameters  Description  Achieves intake and output within specified parameters  Outcome: Met This Shift     Problem:  Activity:  Goal: Ability to tolerate increased activity will improve  Description  Ability to tolerate increased activity will improve  Outcome: Met This Shift     Problem: Pain:  Goal: Pain level will decrease  Description  Pain level will decrease     Outcome: Met This Shift     Problem: Falls - Risk of  Goal: Patient will remain free of falls  Outcome: Met This Shift

## 2019-04-24 LAB
ANION GAP SERPL CALCULATED.3IONS-SCNC: 12 MMOL/L (ref 7–16)
BASOPHILS ABSOLUTE: 0 E9/L (ref 0–0.2)
BASOPHILS RELATIVE PERCENT: 0 % (ref 0–2)
BUN BLDV-MCNC: 8 MG/DL (ref 8–23)
CALCIUM SERPL-MCNC: 9.8 MG/DL (ref 8.6–10.2)
CHLORIDE BLD-SCNC: 103 MMOL/L (ref 98–107)
CO2: 24 MMOL/L (ref 22–29)
CREAT SERPL-MCNC: 0.6 MG/DL (ref 0.7–1.2)
EKG ATRIAL RATE: 74 BPM
EKG P AXIS: 37 DEGREES
EKG P-R INTERVAL: 170 MS
EKG Q-T INTERVAL: 416 MS
EKG QRS DURATION: 98 MS
EKG QTC CALCULATION (BAZETT): 461 MS
EKG R AXIS: 4 DEGREES
EKG T AXIS: 38 DEGREES
EKG VENTRICULAR RATE: 74 BPM
EOSINOPHILS ABSOLUTE: 0 E9/L (ref 0.05–0.5)
EOSINOPHILS RELATIVE PERCENT: 0 % (ref 0–6)
GFR AFRICAN AMERICAN: >60
GFR NON-AFRICAN AMERICAN: >60 ML/MIN/1.73
GLUCOSE BLD-MCNC: 183 MG/DL (ref 74–99)
HCT VFR BLD CALC: 40.2 % (ref 37–54)
HEMOGLOBIN: 13.6 G/DL (ref 12.5–16.5)
IMMATURE GRANULOCYTES #: 0.08 E9/L
IMMATURE GRANULOCYTES %: 0.8 % (ref 0–5)
L. PNEUMOPHILA SEROGP 1 UR AG: NORMAL
LACTIC ACID: 2.4 MMOL/L (ref 0.5–2.2)
LYMPHOCYTES ABSOLUTE: 0.29 E9/L (ref 1.5–4)
LYMPHOCYTES RELATIVE PERCENT: 3 % (ref 20–42)
MAGNESIUM: 1.8 MG/DL (ref 1.6–2.6)
MCH RBC QN AUTO: 30.2 PG (ref 26–35)
MCHC RBC AUTO-ENTMCNC: 33.8 % (ref 32–34.5)
MCV RBC AUTO: 89.1 FL (ref 80–99.9)
MONOCYTES ABSOLUTE: 0.18 E9/L (ref 0.1–0.95)
MONOCYTES RELATIVE PERCENT: 1.9 % (ref 2–12)
NEUTROPHILS ABSOLUTE: 9.09 E9/L (ref 1.8–7.3)
NEUTROPHILS RELATIVE PERCENT: 94.3 % (ref 43–80)
PDW BLD-RTO: 13.4 FL (ref 11.5–15)
PHOSPHORUS: 2.2 MG/DL (ref 2.5–4.5)
PLATELET # BLD: 237 E9/L (ref 130–450)
PMV BLD AUTO: 9.4 FL (ref 7–12)
POTASSIUM SERPL-SCNC: 3.9 MMOL/L (ref 3.5–5)
RBC # BLD: 4.51 E12/L (ref 3.8–5.8)
RBC # BLD: NORMAL 10*6/UL
SODIUM BLD-SCNC: 139 MMOL/L (ref 132–146)
STREP PNEUMONIAE ANTIGEN, URINE: NORMAL
WBC # BLD: 9.6 E9/L (ref 4.5–11.5)

## 2019-04-24 PROCEDURE — 2060000000 HC ICU INTERMEDIATE R&B

## 2019-04-24 PROCEDURE — 97530 THERAPEUTIC ACTIVITIES: CPT

## 2019-04-24 PROCEDURE — 6360000002 HC RX W HCPCS: Performed by: INTERNAL MEDICINE

## 2019-04-24 PROCEDURE — 94640 AIRWAY INHALATION TREATMENT: CPT

## 2019-04-24 PROCEDURE — 85025 COMPLETE CBC W/AUTO DIFF WBC: CPT

## 2019-04-24 PROCEDURE — 2500000003 HC RX 250 WO HCPCS: Performed by: INTERNAL MEDICINE

## 2019-04-24 PROCEDURE — 6370000000 HC RX 637 (ALT 250 FOR IP): Performed by: INTERNAL MEDICINE

## 2019-04-24 PROCEDURE — 2580000003 HC RX 258: Performed by: INTERNAL MEDICINE

## 2019-04-24 PROCEDURE — 87205 SMEAR GRAM STAIN: CPT

## 2019-04-24 PROCEDURE — 2580000003 HC RX 258

## 2019-04-24 PROCEDURE — 84100 ASSAY OF PHOSPHORUS: CPT

## 2019-04-24 PROCEDURE — 2700000000 HC OXYGEN THERAPY PER DAY

## 2019-04-24 PROCEDURE — 36415 COLL VENOUS BLD VENIPUNCTURE: CPT

## 2019-04-24 PROCEDURE — 80048 BASIC METABOLIC PNL TOTAL CA: CPT

## 2019-04-24 PROCEDURE — 94669 MECHANICAL CHEST WALL OSCILL: CPT

## 2019-04-24 PROCEDURE — 83605 ASSAY OF LACTIC ACID: CPT

## 2019-04-24 PROCEDURE — 83735 ASSAY OF MAGNESIUM: CPT

## 2019-04-24 RX ORDER — METHYLPREDNISOLONE SODIUM SUCCINATE 40 MG/ML
40 INJECTION, POWDER, LYOPHILIZED, FOR SOLUTION INTRAMUSCULAR; INTRAVENOUS EVERY 8 HOURS
Status: DISCONTINUED | OUTPATIENT
Start: 2019-04-24 | End: 2019-04-26 | Stop reason: HOSPADM

## 2019-04-24 RX ORDER — SODIUM CHLORIDE 0.9 % (FLUSH) 0.9 %
SYRINGE (ML) INJECTION
Status: COMPLETED
Start: 2019-04-24 | End: 2019-04-24

## 2019-04-24 RX ADMIN — Medication 10 ML: at 12:31

## 2019-04-24 RX ADMIN — IPRATROPIUM BROMIDE AND ALBUTEROL SULFATE 1 AMPULE: .5; 3 SOLUTION RESPIRATORY (INHALATION) at 23:40

## 2019-04-24 RX ADMIN — BUDESONIDE 1000 MCG: 0.25 SUSPENSION RESPIRATORY (INHALATION) at 19:29

## 2019-04-24 RX ADMIN — POTASSIUM PHOSPHATE, MONOBASIC AND POTASSIUM PHOSPHATE, DIBASIC 15 MMOL: 224; 236 INJECTION, SOLUTION, CONCENTRATE INTRAVENOUS at 13:36

## 2019-04-24 RX ADMIN — METHYLPREDNISOLONE SODIUM SUCCINATE 80 MG: 125 INJECTION, POWDER, LYOPHILIZED, FOR SOLUTION INTRAMUSCULAR; INTRAVENOUS at 06:28

## 2019-04-24 RX ADMIN — IPRATROPIUM BROMIDE AND ALBUTEROL SULFATE 1 AMPULE: .5; 3 SOLUTION RESPIRATORY (INHALATION) at 12:49

## 2019-04-24 RX ADMIN — BUDESONIDE 1000 MCG: 0.25 SUSPENSION RESPIRATORY (INHALATION) at 09:52

## 2019-04-24 RX ADMIN — ZOLPIDEM TARTRATE 5 MG: 5 TABLET ORAL at 23:11

## 2019-04-24 RX ADMIN — METHYLPREDNISOLONE SODIUM SUCCINATE 40 MG: 40 INJECTION, POWDER, FOR SOLUTION INTRAMUSCULAR; INTRAVENOUS at 23:11

## 2019-04-24 RX ADMIN — PANTOPRAZOLE SODIUM 40 MG: 40 TABLET, DELAYED RELEASE ORAL at 06:28

## 2019-04-24 RX ADMIN — FORMOTEROL FUMARATE DIHYDRATE 20 MCG: 20 SOLUTION RESPIRATORY (INHALATION) at 19:27

## 2019-04-24 RX ADMIN — METHYLPREDNISOLONE SODIUM SUCCINATE 40 MG: 40 INJECTION, POWDER, FOR SOLUTION INTRAMUSCULAR; INTRAVENOUS at 15:47

## 2019-04-24 RX ADMIN — IBUPROFEN 400 MG: 400 TABLET ORAL at 12:30

## 2019-04-24 RX ADMIN — FORMOTEROL FUMARATE DIHYDRATE 20 MCG: 20 SOLUTION RESPIRATORY (INHALATION) at 09:52

## 2019-04-24 RX ADMIN — IBUPROFEN 400 MG: 400 TABLET ORAL at 08:44

## 2019-04-24 RX ADMIN — IBUPROFEN 400 MG: 400 TABLET ORAL at 16:55

## 2019-04-24 RX ADMIN — LEVOFLOXACIN 500 MG: 5 INJECTION, SOLUTION INTRAVENOUS at 12:30

## 2019-04-24 RX ADMIN — IPRATROPIUM BROMIDE AND ALBUTEROL SULFATE 1 AMPULE: .5; 3 SOLUTION RESPIRATORY (INHALATION) at 19:28

## 2019-04-24 RX ADMIN — ENOXAPARIN SODIUM 40 MG: 40 INJECTION SUBCUTANEOUS at 08:44

## 2019-04-24 RX ADMIN — IPRATROPIUM BROMIDE AND ALBUTEROL SULFATE 1 AMPULE: .5; 3 SOLUTION RESPIRATORY (INHALATION) at 01:50

## 2019-04-24 RX ADMIN — IPRATROPIUM BROMIDE AND ALBUTEROL SULFATE 1 AMPULE: .5; 3 SOLUTION RESPIRATORY (INHALATION) at 09:52

## 2019-04-24 RX ADMIN — IPRATROPIUM BROMIDE AND ALBUTEROL SULFATE 1 AMPULE: .5; 3 SOLUTION RESPIRATORY (INHALATION) at 16:41

## 2019-04-24 ASSESSMENT — PAIN DESCRIPTION - LOCATION
LOCATION: BACK

## 2019-04-24 ASSESSMENT — PAIN SCALES - GENERAL
PAINLEVEL_OUTOF10: 0
PAINLEVEL_OUTOF10: 1
PAINLEVEL_OUTOF10: 1
PAINLEVEL_OUTOF10: 0
PAINLEVEL_OUTOF10: 0

## 2019-04-24 ASSESSMENT — PAIN DESCRIPTION - FREQUENCY
FREQUENCY: INTERMITTENT

## 2019-04-24 ASSESSMENT — PAIN - FUNCTIONAL ASSESSMENT
PAIN_FUNCTIONAL_ASSESSMENT: ACTIVITIES ARE NOT PREVENTED

## 2019-04-24 ASSESSMENT — PAIN DESCRIPTION - PROGRESSION
CLINICAL_PROGRESSION: GRADUALLY IMPROVING

## 2019-04-24 ASSESSMENT — PAIN DESCRIPTION - DESCRIPTORS
DESCRIPTORS: OTHER (COMMENT)
DESCRIPTORS: ACHING;DISCOMFORT;SORE
DESCRIPTORS: ACHING;DISCOMFORT;SORE

## 2019-04-24 ASSESSMENT — PAIN DESCRIPTION - PAIN TYPE
TYPE: SURGICAL PAIN
TYPE: SURGICAL PAIN;ACUTE PAIN
TYPE: SURGICAL PAIN

## 2019-04-24 ASSESSMENT — PAIN DESCRIPTION - ORIENTATION
ORIENTATION: LOWER

## 2019-04-24 ASSESSMENT — PAIN DESCRIPTION - ONSET
ONSET: ON-GOING
ONSET: ON-GOING

## 2019-04-24 NOTE — PROGRESS NOTES
Physical Therapy  Facility/Department: [facility] MED SURG  Daily Treatment Note  NAME: Marshall Galaviz  : 1950  MRN: [id number]    Date of Service: 2019    Patient Diagnosis(es): The primary encounter diagnosis was Community acquired pneumonia of right lower lobe of lung ([address] Diagnoses of Acute respiratory insufficiency and Diarrhea, unspecified type were also pertinent to this visit. has a past medical history of Acute and chronic respiratory failure with hypoxia (HCC), Acute respiratory failure with hypoxia ([address] Bullous emphysema (HCC), Colon cancer screening, COPD (chronic obstructive pulmonary disease) (HCC), Cough with hemoptysis, Pulmonary emphysema with fibrosis of lung ([address] Right lower lobe pneumonia ([address] and Thoracic ascending aortic aneurysm ([address] has a past surgical history that includes Abscess Drainage (); Colonoscopy (2013); and lumbar fusion (2019). Evaluating Therapist: Esther Olmstead PT         Room #:  158   DIAGNOSIS:  Pneumonia   Additional Pertinent History: recent back surgery  - 3 weeks, no brace   PRECAUTIONS: falls, O2 @ 3. 5LNC      Social:  Pt lives with  Wife  in a  1 floor plan  1+1  steps and  No  rails to enter. Prior to admission pt walked with  No AD. Has ww          Initial Evaluation  Date:  19 Treatment      Short Term/ Long Term   Goals   Was pt agreeable to Eval/treatment? yes   yes     Does pt have pain?  mild back pain   no c/o pain     Bed Mobility  Rolling: independent   Supine to sit:   Independent   Sit to supine:  NT   Scooting:  Independent   independent  independent    Transfers Sit to stand:  S/I   Stand to sit:  S/I   Stand pivot:  S/ I   independent  independent    Ambulation     150  feet with  No Ad  with  S/I   150 feet without device Spervision/independent  200 feet with  No AD/AAD  with  Independent    Stair negotiation: ascended and descended NT   NT - pulse ox dropps  4  steps with  1 rail with  Independent    LE

## 2019-04-24 NOTE — PROGRESS NOTES
[facility] Quality Flow/Interdisciplinary Rounds Progress Note        Quality Flow Rounds held on April 24, 2019    Disciplines Attending:  Bedside Nurse, ,  and Nursing Unit Leadership    Jose Carlson was admitted on 4/22/2019 10:45 AM    Anticipated Discharge Date:  Expected Discharge Date: 04/25/19    Disposition:    Sandro Score:  Sandro Scale Score: 21    Readmission Risk              Risk of Unplanned Readmission:        9           Discussed patient goal for the day, patient clinical progression, and barriers to discharge.   The following Goal(s) of the Day/Commitment(s) have been identified:  Wean steroids, D/C IVF      Jasmine Peraza  April 24, 2019

## 2019-04-24 NOTE — PROGRESS NOTES
PROGRESS  NOTE --                                                          INTERNAL  MEDICINE                                                                              I  PERSONALLY SAW , EXAMINED, AND CARED [address], 4/24/2019     LABS, XRAY ,CHART, AND MEDICATIONS  REVIEWED BY ME .      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 *           4/24/19-SUBJECTIVE: Beba Wan is alert awake and cooperative; oriented ×3. Denies any chest pain dyspnea nausea emesis. Tolerating diet. No abdominal pain. Patient continues on 3.5 L nasal cannula, 93% saturation. Temperature 90.9 respirations 20 pulse 80. Hemoptysis has resolved; still having some cough. Patient was worried he would not sleep last night requested something; Ambien did help, states he slept well. Lactic acid was elevated; he was given bolus of normal saline. Seen earlier today by pulmonary; chest x-ray planned for tomorrow. Blood cultures and urine culture negative to date. Viral respiratory panel is negative. Legionella and strep antigens are negative to date. Sodium 139 potassium 3.9 chloride 103 CO2 24 BUN 8 creatinine 0.6 magnesium 1.8 lactic acid 2.4 glucose 183 calcium 9.8 phosphorus 2.2 WBC 9.6 hemoglobin 13.6 platelets 409.  Gram stain of sputum as follows--    Oral Pharyngeal Eliana present    Smear, Respiratory 04/23/2019  9:30 AM  - [facility]   Group 6: <25 PMN's/LPF and <25 Epithelial cells/LPF Few Polymorphonuclear leukocytes   Rare Epithelial cells   Few Gram positive cocci in clusters   Rare gram positive rods Diphtheroid-like              ROS:  Negative to a 10 system review except that mentioned in the HPI. Objective:     PHYSICAL EXAM:    VS: BP (!) 147/78   Pulse 80   Temp 98.9 °F (37.2 °C) (Oral)   Resp 18   Ht 6' 1\" (1.854 m)   Wt 205 lb 6.4 oz (93.2 kg)   SpO2 93%   BMI 27.10 kg/m²   General appearance: Alert, Awake, Oriented times 3, no distress  Skin: Warm and dry ; no rashes  Head: Normocephalic. No masses, lesions or tenderness noted  Eyes: Conjunctivae pink, sclera white. PERRL,EOM-I  Ears: External ears normal  Nose/Sinuses: Nares normal. Septum midline. Mucosa normal. No drainage  Oropharynx: Oropharynx clear with no exudate seen  Neck: Supple. No jugular venous distension, lymphadenopathy or thyromegaly Trachea midline  Lungs: Mostly clear minimal rhonchi right posterior base  Heart: S1 S2  Regular rate and rhythm. No rub, murmur or gallop  Abdomen: Soft, non-tender. BS normal. No masses, organomegaly; no rebound or guarding  Extremities: No edema, Peripheral pulses palpable  Musculoskeletal: Muscular strength appears intact. Neuro:  No focal motor defects ; II-XII grossly intact . 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  *      PLAN:  SEE ORDERS      RE  CHANGES AND FINDINGS   Medications reviewed with patient  GI prophylaxis  DVT prophylaxis  Consultants notes Magnesium:    Lab Results   Component Value Date    MG 1.8 04/24/2019     Phosphorus:    Lab Results   Component Value Date    PHOS 2.2 04/24/2019     Uric Acid:    Lab Results   Component Value Date    LABURIC 3.4 04/23/2019     PT/INR:    Lab Results   Component Value Date    PROTIME 12.7 04/22/2019    INR 1.1 04/22/2019     Warfarin PT/INR:  No components found for: PTPATWAR, PTINRWAR  PTT:    Lab Results   Component Value Date    APTT 29.8 04/22/2019   [APTT}  Troponin:    Lab Results   Component Value Date    TROPONINI <0.01 04/22/2019     Last 3 Troponin:    Lab Results   Component Value Date    TROPONINI <0.01 04/22/2019    TROPONINI <0.01 04/22/2019    TROPONINI <0.01 04/22/2019     U/A:    Lab Results   Component Value Date    COLORU Yellow 04/22/2019    PROTEINU Negative 04/22/2019    PHUR 5.5 04/22/2019    WBCUA 1-3 04/22/2019    RBCUA NONE 04/22/2019    MUCUS Present 04/22/2019    BACTERIA RARE 04/22/2019    CLARITYU Clear 04/22/2019    SPECGRAV 1.020 04/22/2019    LEUKOCYTESUR Negative 04/22/2019    UROBILINOGEN 0.2 04/22/2019    BILIRUBINUR LARGE 04/22/2019    BLOODU Negative 04/22/2019    GLUCOSEU Negative 04/22/2019     ABG:  No results found for: PH, PCO2, PO2, HCO3, BE, THGB, TCO2, O2SAT  HgBA1c:    Lab Results   Component Value Date    LABA1C 5.4 04/22/2019     FLP:    Lab Results   Component Value Date    TRIG 50 04/23/2019    HDL 45 04/23/2019    LDLCALC 69 04/23/2019    LABVLDL 10 04/23/2019     TSH:    Lab Results   Component Value Date    TSH 0.270 04/23/2019     VITAMIN B12: No components found for: B12  FOLATE:    Lab Results   Component Value Date    FOLATE 13.9 04/22/2019     AMYLASE:  No results found for: AMYLASE  LIPASE:    Lab Results   Component Value Date    LIPASE 28 04/22/2019        CBC:  Recent Labs     04/22/19  1108 04/23/19  0240 04/24/19  0244   WBC 11.0 6.2 9.6   RBC 5.21 4.75 4.51   HGB 15.8 14.1 13.6   HCT 46.3 42.5 40.2    255 237   MCV 88.9 89.5 89.1   M[facility]

## 2019-04-25 ENCOUNTER — APPOINTMENT (OUTPATIENT)
Dept: GENERAL RADIOLOGY | Age: 69
DRG: 193 | End: 2019-04-25
Payer: MEDICARE

## 2019-04-25 LAB
ANION GAP SERPL CALCULATED.3IONS-SCNC: 12 MMOL/L (ref 7–16)
BASOPHILS ABSOLUTE: 0.01 E9/L (ref 0–0.2)
BASOPHILS RELATIVE PERCENT: 0.1 % (ref 0–2)
BUN BLDV-MCNC: 11 MG/DL (ref 8–23)
CALCIUM SERPL-MCNC: 10 MG/DL (ref 8.6–10.2)
CHLORIDE BLD-SCNC: 100 MMOL/L (ref 98–107)
CO2: 26 MMOL/L (ref 22–29)
CREAT SERPL-MCNC: 0.7 MG/DL (ref 0.7–1.2)
CULTURE, RESPIRATORY: NORMAL
EOSINOPHILS ABSOLUTE: 0 E9/L (ref 0.05–0.5)
EOSINOPHILS RELATIVE PERCENT: 0 % (ref 0–6)
GFR AFRICAN AMERICAN: >60
GFR NON-AFRICAN AMERICAN: >60 ML/MIN/1.73
GLUCOSE BLD-MCNC: 165 MG/DL (ref 74–99)
GRAM STAIN ORDERABLE: NORMAL
HCT VFR BLD CALC: 42 % (ref 37–54)
HEMOGLOBIN: 13.8 G/DL (ref 12.5–16.5)
IMMATURE GRANULOCYTES #: 0.19 E9/L
IMMATURE GRANULOCYTES %: 2.1 % (ref 0–5)
LYMPHOCYTES ABSOLUTE: 0.35 E9/L (ref 1.5–4)
LYMPHOCYTES RELATIVE PERCENT: 3.9 % (ref 20–42)
MAGNESIUM: 1.9 MG/DL (ref 1.6–2.6)
MCH RBC QN AUTO: 29.8 PG (ref 26–35)
MCHC RBC AUTO-ENTMCNC: 32.9 % (ref 32–34.5)
MCV RBC AUTO: 90.7 FL (ref 80–99.9)
MONOCYTES ABSOLUTE: 0.24 E9/L (ref 0.1–0.95)
MONOCYTES RELATIVE PERCENT: 2.7 % (ref 2–12)
NEUTROPHILS ABSOLUTE: 8.25 E9/L (ref 1.8–7.3)
NEUTROPHILS RELATIVE PERCENT: 91.2 % (ref 43–80)
PDW BLD-RTO: 13.6 FL (ref 11.5–15)
PHOSPHORUS: 2.9 MG/DL (ref 2.5–4.5)
PLATELET # BLD: 233 E9/L (ref 130–450)
PMV BLD AUTO: 9.4 FL (ref 7–12)
POTASSIUM SERPL-SCNC: 4.6 MMOL/L (ref 3.5–5)
RBC # BLD: 4.63 E12/L (ref 3.8–5.8)
RBC # BLD: NORMAL 10*6/UL
SMEAR, RESPIRATORY: NORMAL
SODIUM BLD-SCNC: 138 MMOL/L (ref 132–146)
WBC # BLD: 9 E9/L (ref 4.5–11.5)

## 2019-04-25 PROCEDURE — 6360000002 HC RX W HCPCS: Performed by: INTERNAL MEDICINE

## 2019-04-25 PROCEDURE — 80048 BASIC METABOLIC PNL TOTAL CA: CPT

## 2019-04-25 PROCEDURE — 97530 THERAPEUTIC ACTIVITIES: CPT

## 2019-04-25 PROCEDURE — 83735 ASSAY OF MAGNESIUM: CPT

## 2019-04-25 PROCEDURE — 1200000000 HC SEMI PRIVATE

## 2019-04-25 PROCEDURE — 2580000003 HC RX 258

## 2019-04-25 PROCEDURE — 94669 MECHANICAL CHEST WALL OSCILL: CPT

## 2019-04-25 PROCEDURE — 94640 AIRWAY INHALATION TREATMENT: CPT

## 2019-04-25 PROCEDURE — 36415 COLL VENOUS BLD VENIPUNCTURE: CPT

## 2019-04-25 PROCEDURE — 6370000000 HC RX 637 (ALT 250 FOR IP): Performed by: INTERNAL MEDICINE

## 2019-04-25 PROCEDURE — 94760 N-INVAS EAR/PLS OXIMETRY 1: CPT

## 2019-04-25 PROCEDURE — 2700000000 HC OXYGEN THERAPY PER DAY

## 2019-04-25 PROCEDURE — 71046 X-RAY EXAM CHEST 2 VIEWS: CPT

## 2019-04-25 PROCEDURE — 85025 COMPLETE CBC W/AUTO DIFF WBC: CPT

## 2019-04-25 PROCEDURE — 84100 ASSAY OF PHOSPHORUS: CPT

## 2019-04-25 RX ORDER — SODIUM CHLORIDE 0.9 % (FLUSH) 0.9 %
SYRINGE (ML) INJECTION
Status: COMPLETED
Start: 2019-04-25 | End: 2019-04-25

## 2019-04-25 RX ADMIN — IPRATROPIUM BROMIDE AND ALBUTEROL SULFATE 1 AMPULE: .5; 3 SOLUTION RESPIRATORY (INHALATION) at 13:52

## 2019-04-25 RX ADMIN — ENOXAPARIN SODIUM 40 MG: 40 INJECTION SUBCUTANEOUS at 08:57

## 2019-04-25 RX ADMIN — BUDESONIDE 1000 MCG: 0.25 SUSPENSION RESPIRATORY (INHALATION) at 10:20

## 2019-04-25 RX ADMIN — IPRATROPIUM BROMIDE AND ALBUTEROL SULFATE 1 AMPULE: .5; 3 SOLUTION RESPIRATORY (INHALATION) at 20:08

## 2019-04-25 RX ADMIN — ZOLPIDEM TARTRATE 5 MG: 5 TABLET ORAL at 20:52

## 2019-04-25 RX ADMIN — PANTOPRAZOLE SODIUM 40 MG: 40 TABLET, DELAYED RELEASE ORAL at 06:49

## 2019-04-25 RX ADMIN — SODIUM CHLORIDE, PRESERVATIVE FREE: 5 INJECTION INTRAVENOUS at 17:08

## 2019-04-25 RX ADMIN — IBUPROFEN 400 MG: 400 TABLET ORAL at 08:57

## 2019-04-25 RX ADMIN — LEVOFLOXACIN 500 MG: 5 INJECTION, SOLUTION INTRAVENOUS at 13:12

## 2019-04-25 RX ADMIN — METHYLPREDNISOLONE SODIUM SUCCINATE 40 MG: 40 INJECTION, POWDER, FOR SOLUTION INTRAMUSCULAR; INTRAVENOUS at 17:01

## 2019-04-25 RX ADMIN — BUDESONIDE 1000 MCG: 0.25 SUSPENSION RESPIRATORY (INHALATION) at 20:08

## 2019-04-25 RX ADMIN — FORMOTEROL FUMARATE DIHYDRATE 20 MCG: 20 SOLUTION RESPIRATORY (INHALATION) at 20:08

## 2019-04-25 RX ADMIN — METHYLPREDNISOLONE SODIUM SUCCINATE 40 MG: 40 INJECTION, POWDER, FOR SOLUTION INTRAMUSCULAR; INTRAVENOUS at 06:50

## 2019-04-25 RX ADMIN — IBUPROFEN 400 MG: 400 TABLET ORAL at 11:36

## 2019-04-25 RX ADMIN — IPRATROPIUM BROMIDE AND ALBUTEROL SULFATE 1 AMPULE: .5; 3 SOLUTION RESPIRATORY (INHALATION) at 15:24

## 2019-04-25 RX ADMIN — IPRATROPIUM BROMIDE AND ALBUTEROL SULFATE 1 AMPULE: .5; 3 SOLUTION RESPIRATORY (INHALATION) at 10:21

## 2019-04-25 RX ADMIN — IPRATROPIUM BROMIDE AND ALBUTEROL SULFATE 1 AMPULE: .5; 3 SOLUTION RESPIRATORY (INHALATION) at 04:30

## 2019-04-25 RX ADMIN — METHYLPREDNISOLONE SODIUM SUCCINATE 40 MG: 40 INJECTION, POWDER, FOR SOLUTION INTRAMUSCULAR; INTRAVENOUS at 23:48

## 2019-04-25 RX ADMIN — FORMOTEROL FUMARATE DIHYDRATE 20 MCG: 20 SOLUTION RESPIRATORY (INHALATION) at 10:21

## 2019-04-25 RX ADMIN — IBUPROFEN 400 MG: 400 TABLET ORAL at 17:01

## 2019-04-25 ASSESSMENT — PAIN - FUNCTIONAL ASSESSMENT: PAIN_FUNCTIONAL_ASSESSMENT: ACTIVITIES ARE NOT PREVENTED

## 2019-04-25 ASSESSMENT — PAIN SCALES - GENERAL
PAINLEVEL_OUTOF10: 0
PAINLEVEL_OUTOF10: 1
PAINLEVEL_OUTOF10: 2

## 2019-04-25 ASSESSMENT — PAIN DESCRIPTION - DESCRIPTORS: DESCRIPTORS: ACHING;DISCOMFORT;SORE

## 2019-04-25 ASSESSMENT — PAIN DESCRIPTION - PAIN TYPE: TYPE: SURGICAL PAIN

## 2019-04-25 ASSESSMENT — PAIN DESCRIPTION - ONSET: ONSET: ON-GOING

## 2019-04-25 ASSESSMENT — PAIN DESCRIPTION - LOCATION: LOCATION: BACK

## 2019-04-25 ASSESSMENT — PAIN DESCRIPTION - FREQUENCY: FREQUENCY: INTERMITTENT

## 2019-04-25 ASSESSMENT — PAIN DESCRIPTION - PROGRESSION: CLINICAL_PROGRESSION: NOT CHANGED

## 2019-04-25 ASSESSMENT — PAIN DESCRIPTION - ORIENTATION: ORIENTATION: LOWER

## 2019-04-25 NOTE — PROGRESS NOTES
4 to 4+/ 5        AM- PAC RAW score  20/ 24   23/24          Balance: sitting EOB independent, standing with no device independent. Patient education  Pt was educated on PT objectives during treatment session, deep breathing. Patient response to education:   Pt verbalized understanding Pt demonstrated skill Pt requires further education in this area       yes     Additional Comments: Pt found ambulating into the restroom. No LOB during ambulation. Pulse ox out of the restroom 82% and pt took standing rest break and increased to 90%. After ambulation, pulse ox dropped to 81%. Pt was left sitting on EOB with call light left by patient. Chair/bed alarm: no    Time in: 1343  Time out: 1357    Pt is making good progress toward established Physical Therapy goals. Continue with physical therapy current plan of care.     Jose F Segundo PT  License Number: [id number]

## 2019-04-26 ENCOUNTER — APPOINTMENT (OUTPATIENT)
Dept: GENERAL RADIOLOGY | Age: 69
DRG: 193 | End: 2019-04-26
Payer: MEDICARE

## 2019-04-26 VITALS
HEIGHT: 73 IN | DIASTOLIC BLOOD PRESSURE: 82 MMHG | TEMPERATURE: 97.7 F | HEART RATE: 68 BPM | BODY MASS INDEX: 27.05 KG/M2 | SYSTOLIC BLOOD PRESSURE: 132 MMHG | RESPIRATION RATE: 16 BRPM | WEIGHT: 204.1 LBS | OXYGEN SATURATION: 93 %

## 2019-04-26 LAB
ANION GAP SERPL CALCULATED.3IONS-SCNC: 11 MMOL/L (ref 7–16)
BASOPHILS ABSOLUTE: 0.02 E9/L (ref 0–0.2)
BASOPHILS RELATIVE PERCENT: 0.2 % (ref 0–2)
BUN BLDV-MCNC: 16 MG/DL (ref 8–23)
CALCIUM SERPL-MCNC: 9.9 MG/DL (ref 8.6–10.2)
CHLORIDE BLD-SCNC: 100 MMOL/L (ref 98–107)
CO2: 28 MMOL/L (ref 22–29)
CREAT SERPL-MCNC: 0.7 MG/DL (ref 0.7–1.2)
EOSINOPHILS ABSOLUTE: 0 E9/L (ref 0.05–0.5)
EOSINOPHILS RELATIVE PERCENT: 0 % (ref 0–6)
GFR AFRICAN AMERICAN: >60
GFR NON-AFRICAN AMERICAN: >60 ML/MIN/1.73
GLUCOSE BLD-MCNC: 137 MG/DL (ref 74–99)
HCT VFR BLD CALC: 43.6 % (ref 37–54)
HEMOGLOBIN: 14.3 G/DL (ref 12.5–16.5)
IMMATURE GRANULOCYTES #: 0.32 E9/L
IMMATURE GRANULOCYTES %: 3.6 % (ref 0–5)
LYMPHOCYTES ABSOLUTE: 0.45 E9/L (ref 1.5–4)
LYMPHOCYTES RELATIVE PERCENT: 5 % (ref 20–42)
MAGNESIUM: 1.9 MG/DL (ref 1.6–2.6)
MCH RBC QN AUTO: 29.5 PG (ref 26–35)
MCHC RBC AUTO-ENTMCNC: 32.8 % (ref 32–34.5)
MCV RBC AUTO: 90.1 FL (ref 80–99.9)
MONOCYTES ABSOLUTE: 0.29 E9/L (ref 0.1–0.95)
MONOCYTES RELATIVE PERCENT: 3.2 % (ref 2–12)
NEUTROPHILS ABSOLUTE: 7.87 E9/L (ref 1.8–7.3)
NEUTROPHILS RELATIVE PERCENT: 88 % (ref 43–80)
PDW BLD-RTO: 13.5 FL (ref 11.5–15)
PHOSPHORUS: 3.4 MG/DL (ref 2.5–4.5)
PLATELET # BLD: 234 E9/L (ref 130–450)
PMV BLD AUTO: 9.5 FL (ref 7–12)
POTASSIUM SERPL-SCNC: 4.2 MMOL/L (ref 3.5–5)
RBC # BLD: 4.84 E12/L (ref 3.8–5.8)
SODIUM BLD-SCNC: 139 MMOL/L (ref 132–146)
WBC # BLD: 9 E9/L (ref 4.5–11.5)

## 2019-04-26 PROCEDURE — 6370000000 HC RX 637 (ALT 250 FOR IP): Performed by: INTERNAL MEDICINE

## 2019-04-26 PROCEDURE — 6360000002 HC RX W HCPCS: Performed by: INTERNAL MEDICINE

## 2019-04-26 PROCEDURE — 84100 ASSAY OF PHOSPHORUS: CPT

## 2019-04-26 PROCEDURE — 94640 AIRWAY INHALATION TREATMENT: CPT

## 2019-04-26 PROCEDURE — 2700000000 HC OXYGEN THERAPY PER DAY

## 2019-04-26 PROCEDURE — 71046 X-RAY EXAM CHEST 2 VIEWS: CPT

## 2019-04-26 PROCEDURE — 36415 COLL VENOUS BLD VENIPUNCTURE: CPT

## 2019-04-26 PROCEDURE — 85025 COMPLETE CBC W/AUTO DIFF WBC: CPT

## 2019-04-26 PROCEDURE — 94669 MECHANICAL CHEST WALL OSCILL: CPT

## 2019-04-26 PROCEDURE — 80048 BASIC METABOLIC PNL TOTAL CA: CPT

## 2019-04-26 PROCEDURE — 83735 ASSAY OF MAGNESIUM: CPT

## 2019-04-26 RX ORDER — LEVOFLOXACIN 250 MG/1
TABLET ORAL
Qty: 20 TABLET | Refills: 0 | Status: SHIPPED | OUTPATIENT
Start: 2019-04-26 | End: 2019-05-30 | Stop reason: ALTCHOICE

## 2019-04-26 RX ORDER — PREDNISONE 20 MG/1
TABLET ORAL
Qty: 20 TABLET | Refills: 0 | Status: SHIPPED | OUTPATIENT
Start: 2019-04-26 | End: 2019-05-30 | Stop reason: ALTCHOICE

## 2019-04-26 RX ADMIN — IPRATROPIUM BROMIDE AND ALBUTEROL SULFATE 1 AMPULE: .5; 3 SOLUTION RESPIRATORY (INHALATION) at 11:15

## 2019-04-26 RX ADMIN — IPRATROPIUM BROMIDE AND ALBUTEROL SULFATE 1 AMPULE: .5; 3 SOLUTION RESPIRATORY (INHALATION) at 07:42

## 2019-04-26 RX ADMIN — BUDESONIDE 1000 MCG: 0.25 SUSPENSION RESPIRATORY (INHALATION) at 07:42

## 2019-04-26 RX ADMIN — FORMOTEROL FUMARATE DIHYDRATE 20 MCG: 20 SOLUTION RESPIRATORY (INHALATION) at 07:42

## 2019-04-26 RX ADMIN — METHYLPREDNISOLONE SODIUM SUCCINATE 40 MG: 40 INJECTION, POWDER, FOR SOLUTION INTRAMUSCULAR; INTRAVENOUS at 06:24

## 2019-04-26 RX ADMIN — IBUPROFEN 400 MG: 400 TABLET ORAL at 08:10

## 2019-04-26 RX ADMIN — IPRATROPIUM BROMIDE AND ALBUTEROL SULFATE 1 AMPULE: .5; 3 SOLUTION RESPIRATORY (INHALATION) at 04:34

## 2019-04-26 RX ADMIN — IPRATROPIUM BROMIDE AND ALBUTEROL SULFATE 1 AMPULE: .5; 3 SOLUTION RESPIRATORY (INHALATION) at 15:32

## 2019-04-26 RX ADMIN — ENOXAPARIN SODIUM 40 MG: 40 INJECTION SUBCUTANEOUS at 09:32

## 2019-04-26 RX ADMIN — PANTOPRAZOLE SODIUM 40 MG: 40 TABLET, DELAYED RELEASE ORAL at 06:24

## 2019-04-26 RX ADMIN — IBUPROFEN 400 MG: 400 TABLET ORAL at 12:24

## 2019-04-26 RX ADMIN — LEVOFLOXACIN 500 MG: 5 INJECTION, SOLUTION INTRAVENOUS at 12:24

## 2019-04-26 RX ADMIN — IPRATROPIUM BROMIDE AND ALBUTEROL SULFATE 1 AMPULE: .5; 3 SOLUTION RESPIRATORY (INHALATION) at 00:03

## 2019-04-26 ASSESSMENT — PAIN SCALES - GENERAL
PAINLEVEL_OUTOF10: 0

## 2019-04-26 NOTE — PROGRESS NOTES
40 mg Subcutaneous Daily Joel Mention Mihok, DO   40 mg at 19 0932    pantoprazole (PROTONIX) tablet 40 mg  40 mg Oral QAM AC Jim FRY Mihok, DO   40 mg at 19 0624    ondansetron (ZOFRAN) injection 4 mg  4 mg Intravenous Q6H PRN Joel Mention Mihok, DO   4 mg at 19 2133    acetaminophen (TYLENOL) tablet 650 mg  650 mg Oral Q4H PRN Joel Mention Mihok, DO   650 mg at 19 0348     Scheduled Meds:   methylPREDNISolone  40 mg Intravenous Q8H    levofloxacin  500 mg Intravenous Q24H    budesonide  1,000 mcg Nebulization BID    ipratropium-albuterol  1 ampule Inhalation Q4H    zolpidem  5 mg Oral Nightly    ibuprofen  400 mg Oral TID WC    formoterol  20 mcg Nebulization BID    enoxaparin  40 mg Subcutaneous Daily    pantoprazole  40 mg Oral QAM AC       Continuous Infusions:      Data:   Temperature:  Current - Temp: 97.7 °F (36.5 °C); Max - Temp  Av.9 °F (36.6 °C)  Min: 97.7 °F (36.5 °C)  Max: 98.1 °F (36.7 °C)    Respiratory Rate : Resp  Av.3  Min: 16  Max: 18    Pulse Range: Pulse  Av.7  Min: 68  Max: 93    Blood Presuure Range:  Systolic (48QPY), AGA:522 , Min:127 , BHP:007   ; Diastolic (61QDG), HSF:75, Min:81, Max:84      Pulse ox Range: SpO2  Av %  Min: 92 %  Max: 96 %    Patient Vitals for the past 8 hrs:   BP Temp Temp src Pulse Resp SpO2   19 1047 -- -- -- -- -- 93 %   19 0732 132/82 97.7 °F (36.5 °C) Oral 68 16 96 %         Intake/Output Summary (Last 24 hours) at 2019 1340  Last data filed at 2019 0810  Gross per 24 hour   Intake 580 ml   Output 700 ml   Net -120 ml       I/O last 3 completed shifts:   In: 580 [P.O.:480; I.V.:100]  Out: 1400 [Urine:1400]    I/O this shift:  In: 360 [P.O.:360]  Out: -     Wt Readings from Last 3 Encounters:   19 204 lb 1.6 oz (92.6 kg)   18 205 lb 14.4 oz (93.4 kg)   18 210 lb (95.3 kg)       Labs:   CBC:   Lab Results   Component Value Date    WBC 9.0 2019    RBC 4.84 2019    HGB 14.3 04/26/2019    HCT 43.6 04/26/2019    MCV 90.1 04/26/2019    MCH 29.5 04/26/2019    MCHC 32.8 04/26/2019    RDW 13.5 04/26/2019     04/26/2019    MPV 9.5 04/26/2019     CBC with Differential:    Lab Results   Component Value Date    WBC 9.0 04/26/2019    RBC 4.84 04/26/2019    HGB 14.3 04/26/2019    HCT 43.6 04/26/2019     04/26/2019    MCV 90.1 04/26/2019    MCH 29.5 04/26/2019    MCHC 32.8 04/26/2019    RDW 13.5 04/26/2019    LYMPHOPCT 5.0 04/26/2019    MONOPCT 3.2 04/26/2019    BASOPCT 0.2 04/26/2019    MONOSABS 0.29 04/26/2019    LYMPHSABS 0.45 04/26/2019    EOSABS 0.00 04/26/2019    BASOSABS 0.02 04/26/2019     Hemoglobin/Hematocrit:    Lab Results   Component Value Date    HGB 14.3 04/26/2019    HCT 43.6 04/26/2019     CMP:    Lab Results   Component Value Date     04/26/2019    K 4.2 04/26/2019    K 4.4 04/22/2019     04/26/2019    CO2 28 04/26/2019    BUN 16 04/26/2019    CREATININE 0.7 04/26/2019    GFRAA >60 04/26/2019    LABGLOM >60 04/26/2019    GLUCOSE 137 04/26/2019    PROT 7.3 04/22/2019    LABALBU 4.2 04/22/2019    CALCIUM 9.9 04/26/2019    BILITOT 0.5 04/22/2019    ALKPHOS 87 04/22/2019    AST 13 04/22/2019    ALT 8 04/22/2019     BMP:    Lab Results   Component Value Date     04/26/2019    K 4.2 04/26/2019    K 4.4 04/22/2019     04/26/2019    CO2 28 04/26/2019    BUN 16 04/26/2019    LABALBU 4.2 04/22/2019    CREATININE 0.7 04/26/2019    CALCIUM 9.9 04/26/2019    GFRAA >60 04/26/2019    LABGLOM >60 04/26/2019    GLUCOSE 137 04/26/2019     Hepatic Function Panel:    Lab Results   Component Value Date    ALKPHOS 87 04/22/2019    ALT 8 04/22/2019    AST 13 04/22/2019    PROT 7.3 04/22/2019    BILITOT 0.5 04/22/2019    LABALBU 4.2 04/22/2019     Magnesium:    Lab Results   Component Value Date    MG 1.9 04/26/2019     Phosphorus:    Lab Results   Component Value Date    PHOS 3.4 04/26/2019     Uric Acid:    Lab Results   Component Value Date    LABURIC 3.4

## 2019-04-27 LAB
BLOOD CULTURE, ROUTINE: NORMAL
CULTURE, BLOOD 2: NORMAL

## 2019-04-29 NOTE — DISCHARGE SUMMARY
DISCHARGE SUMMARY  Patient ID:  Dinh Shipman  [id number]  25 y.o.  1950    Admit date: 4/22/2019      Discharge date :4/26/19       Admission Diagnoses: Pneumonia [J18.9]  Pneumonia [J18.9]    Discharge Diagnosis  Principal Problem:    Right lower lobe pneumonia ([facility]  Active Problems:    COPD (chronic obstructive pulmonary disease) ([facility]    Acute and chronic respiratory failure with hypoxia (HCC)    COPD exacerbation (HCC)    Acute respiratory failure with hypoxia (HCC)    Bullous emphysema ([facility]    Pulmonary emphysema with fibrosis of lung (HCC)    Thoracic ascending aortic aneurysm (HCC)    Pneumonia    Cough with hemoptysis  Resolved Problems:    * No resolved hospital problems. [facility] Course:CHIEF COMPLAINT: Short of breath, cough, hemoptysis       History of Present Illness: 82-year-old male patient of Dr. Santiago Min I'm asked to admit and follow. Patient states that he had lumbar fusion surgery performed at outside hospital 4-5 weeks prior to this. He was discharged today after surgery; he did not think he was well enough. On the way home, he had episodes of emesis. Since that time he is not felt right. While at home, 4/17/19 onset of fever as high as 101.9. His wife encouraged him to report to the ED on 4/18 but he refused. He continued to worsen; he was having productive cough yellow sputum through that time interval and then developed hemoptysis with the sputum. Hemoptysis became significant on 4/20/19; he had to cancel his trip to [facility] 4/21/19 because he felt so poorly. He eventually presented to the ED and was admitted. --In the ED SPO2 84% on room air  --Chest x-ray done in the ED with following results       FINDINGS:  Heart size is in upper limits of normal. There is severe advanced  bullous emphysema and scarring. Patchy bibasilar infiltrates and  pleural effusions are noted.  There is elevation of the right  hemidiaphragm with  blunting of the right costophrenic angle which may  represent combination of scarring and pleural thickening.       Impression:       Advanced bullous emphysema with  patchy bibasilar infiltrates and  small pleural effusion likely pneumonia      --CTA of the chest done in the ED with following results--           Thyroid is unremarkable. There is no evidence of pulmonary embolism to  the segmental level. More distal pulmonary emboli are not excluded. Visualized portions of the heart, esophagus, stomach, liver, spleen,  adrenals, visualized pancreas and kidneys grossly unremarkable. Mild  to moderate sized hiatal hernia. Vascular calcifications left anterior  descending left circumflex coronary arteries. Vertebral body heights  and intervertebral disc spaces are well-preserved. There is normal  sagittal alignment of the visualized spine. No lytic or blastic bony  lesions. . Mild degenerative changes lower thoracic spine. Right lun. A suspected developing infiltrate/pneumonia superimposed in the  right lung base. 2. No discrete noncalcified pulmonary nodule or spiculated mass is  identifiable at this time. Severe centrilobular emphysema. No supraclavicular, axillary,  lymphadenopathy is identified. Right paratracheal lymphadenopathy  measuring approximately 1.0 x 1.7 cm. Precarinal lymphadenopathy  measured at approximately 1.0 by approximately 1.7 cm. Subcarinal  lymphadenopathy measuring approximately 1.6 x 1.6 cm.     Left lun. No focal area of infiltrate/pneumonia, pneumothorax or pleural  effusion is identified. 2. No discrete noncalcified pulmonary nodule or spiculated mass is  seen. 3. Small amount left pleural fluid. 4. Posteriorly dependent atelectasis in the left lung base.       Impression:       ALERT:  THIS IS AN ABNORMAL REPORT  1. No CT evidence of pulmonary embolism to the segmental level is  identified. More distal pulmonary emboli are not excluded.   2. Severe centrilobular emphysema with a suspected superimposed right  basilar infiltrate/pneumonia. Continued follow-up to complete  resolution is recommended. 3. Abnormally enlarged precarinal and right paratracheal and  subcarinal lymphadenopathy, findings are nonspecific and could reflect  infection, inflammatory/reactive lymphadenopathy, metastatic  disease/malignancy with lymphoma felt less likely. 4. Small amount left pleural fluid. 5. Mild to moderate sized hiatal hernia with mild degenerative changes  lower thoracic spine. 6. Vascular calcifications left anterior descending and left  circumflex coronary arteries.         --He has known COPD, currently on 3.5 L oxygen nightly at home  --Denies any rheumatic fever scarlet fever or polio diphtheria cancer diabetes or hypertension. --Both his parents lived until her mid [de-identified]; they had no pulmonary difficulties  --Patient is an ex-smoker  --Denies any significant cardiac difficulties. --He has been having epigastric area discomfort but nothing unbearable  --He develops bronchitis and/or pneumonia once or twice a year  --He had screening colonoscopy 11/2013 with polypectomy  --2-D echo completed 11/2018 revealed--  Summary   Left ventricle grossly normal in size.   Normal LV segmental wall motion.   Normal left ventricular wall thickness.   Estimated left ventricular ejection fraction is 62±5%.   <50% criteria for diastolic dysfunction.   The LAESV Index is <34ml/m2.   Moderately dilated right ventricle.   Right ventricular segmental wall motion is normal.   Physiologic and/or trace mitral regurgitation is present.   Trace-mild aortic regurgitation is noted.   Physiologic and/or trace tricuspid regurgitation.  RVSP is 29 mmHg.   Physiologic and/or trace pulmonic regurgitation present.   Technically fair quality study.   Compared to prior echo, no significant changes noted.   Suggest clinical correlation. 4/24/19-SUBJECTIVE: Carson Murillo is alert awake and cooperative; oriented ×3. Denies any chest pain dyspnea nausea emesis. Warfarin PT/INR:  No components found for: Kyara Hernandez  PTT:    Lab Results   Component Value Date    APTT 29.8 04/22/2019   [APTT}  Troponin:    Lab Results   Component Value Date    TROPONINI <0.01 04/22/2019     Last 3 Troponin:    Lab Results   Component Value Date    TROPONINI <0.01 04/22/2019    TROPONINI <0.01 04/22/2019    TROPONINI <0.01 04/22/2019     U/A:    Lab Results   Component Value Date    COLORU Yellow 04/22/2019    PROTEINU Negative 04/22/2019    PHUR 5.5 04/22/2019    WBCUA 1-3 04/22/2019    RBCUA NONE 04/22/2019    MUCUS Present 04/22/2019    BACTERIA RARE 04/22/2019    CLARITYU Clear 04/22/2019    SPECGRAV 1.020 04/22/2019    LEUKOCYTESUR Negative 04/22/2019    UROBILINOGEN 0.2 04/22/2019    BILIRUBINUR LARGE 04/22/2019    BLOODU Negative 04/22/2019    GLUCOSEU Negative 04/22/2019     ABG:  No results found for: PH, PCO2, PO2, HCO3, BE, THGB, TCO2, O2SAT  HgBA1c:    Lab Results   Component Value Date    LABA1C 5.4 04/22/2019     FLP:    Lab Results   Component Value Date    TRIG 50 04/23/2019    HDL 45 04/23/2019    LDLCALC 69 04/23/2019    LABVLDL 10 04/23/2019     TSH:    Lab Results   Component Value Date    TSH 0.270 04/23/2019     VITAMIN B12: No components found for: B12  FOLATE:    Lab Results   Component Value Date    FOLATE 13.9 04/22/2019     AMYLASE:  No results found for: AMYLASE  LIPASE:    Lab Results   Component Value Date    LIPASE 28 04/22/2019          CBC:No results for input(s): WBC, RBC, HGB, HCT, PLT, MCV, MCH, MCHC, RDW, NRBC, SEGSPCT, BANDSPCT, BLASTSPCT, METASPCT, LYMPHOPCT, PROMYELOPCT, MONOPCT, MYELOPCT, EOSPCT, BASOPCT, MONOSABS, LYMPHSABS, EOSABS, BASOSABS, DIFFTYPE in the last 72 hours. Invalid input(s): ATYLMREL       H & H :No results for input(s): HGB in the last 72 hours. TSH:No results for input(s): TSH in the last 72 hours. GLUCOSE:No results for input(s): POCGLU in the last 72 hours.     CMP:No results for input(s): NA, K, CL, CO2, BUN, according to patient's size or 3. Use of iterative reconstruction. Thyroid is unremarkable. There is no evidence of pulmonary embolism to the segmental level. More distal pulmonary emboli are not excluded. Visualized portions of the heart, esophagus, stomach, liver, spleen, adrenals, visualized pancreas and kidneys grossly unremarkable. Mild to moderate sized hiatal hernia. Vascular calcifications left anterior descending left circumflex coronary arteries. Vertebral body heights and intervertebral disc spaces are well-preserved. There is normal sagittal alignment of the visualized spine. No lytic or blastic bony lesions. . Mild degenerative changes lower thoracic spine. Right lun. A suspected developing infiltrate/pneumonia superimposed in the right lung base. 2. No discrete noncalcified pulmonary nodule or spiculated mass is identifiable at this time. Severe centrilobular emphysema. No supraclavicular, axillary, lymphadenopathy is identified. Right paratracheal lymphadenopathy measuring approximately 1.0 x 1.7 cm. Precarinal lymphadenopathy measured at approximately 1.0 by approximately 1.7 cm. Subcarinal lymphadenopathy measuring approximately 1.6 x 1.6 cm. Left lun. No focal area of infiltrate/pneumonia, pneumothorax or pleural effusion is identified. 2. No discrete noncalcified pulmonary nodule or spiculated mass is seen. 3. Small amount left pleural fluid. 4. Posteriorly dependent atelectasis in the left lung base. ALERT:  THIS IS AN ABNORMAL REPORT 1. No CT evidence of pulmonary embolism to the segmental level is identified. More distal pulmonary emboli are not excluded. 2. Severe centrilobular emphysema with a suspected superimposed right basilar infiltrate/pneumonia. Continued follow-up to complete resolution is recommended.  3. Abnormally enlarged precarinal and right paratracheal and subcarinal lymphadenopathy, findings are nonspecific and could reflect infection, inflammatory/reactive Bullous emphysema (HCC) [J43.9]    Acute respiratory failure with hypoxia (HCC) [J96.01]    COPD exacerbation (HCC) [J44.1]    COPD (chronic obstructive pulmonary disease) ([facility] [J44.9]    Acute and chronic respiratory failure with hypoxia ([facility] [J96.21]       Signed:    Vinod Jaimes DO    4/29/2019    12:39 PM    Voice recognition software use for dictation

## 2019-05-22 ENCOUNTER — CARE COORDINATION (OUTPATIENT)
Dept: CASE MANAGEMENT | Age: 69
End: 2019-05-22

## 2019-05-22 ENCOUNTER — CLINICAL DOCUMENTATION (OUTPATIENT)
Dept: CASE MANAGEMENT | Age: 69
End: 2019-05-22

## 2019-05-22 NOTE — CARE COORDINATION
BPCI-A Medical Bundle Patient. Working DRG: PNA ([id number])  Admitting Location: [address] [place] [address] Date: 4/22/2019  Date of Discharge: 4/26/2019    End Date: 7/25/2019    90-day post-acute outreach and tracking with qualifying DRG. BPCI-A Medicare Bundle Initiative letter mailed to address listed for patient in chart.

## 2019-05-22 NOTE — LETTER
Beneficiary Notification Letter     This Moi Kingsley Provider is Participating in an Innovative Payment and [address] [place] [address] [place] from Medicare     Greetings:   Scout Garay is participating in a Medicare initiative called the [facility] for [facility]. You are receiving this letter because your health care provider has identified you as a patient who is receiving care through this initiative. Health care providers participating in the [facility], including Scout Garay, will work with Medicare to improve care for patients. Your Medicare rights have not been changed. You still have all the same Medicare rights and protections, including the right to choose which hospital, doctor, or other health care provider you see. However, because Scout Garay chose to participate in the [facility], all Medicare beneficiaries who meet the eligibility criteria of this initiative will receive care under the initiative. If you do not wish to receive care under the Bundled Payments [facility], you must choose a health care provider that does not participate in this initiative for your care. Regardless of which health care provider you see, Medicare will continue to cover all of your medically necessary services. Bundled Payments for Care Improvement Advanced aims to help improve your care     The Bundled Payments [facility] [facility] is an innovative Medicare initiative that encourages your doctors, hospitals, and other health care providers to work more closely together so you get better care during and following certain hospital stays.  In this initiative, doctors and hospitals may work closely with certain health care providers and suppliers that help patients recover after discharge from the hospital, including skilled nursing facilities, home health agencies, inpatient rehabilitation facilities, and long term care hospitals. [address] [place] [address] is working closely with the doctors and other health care providers that care for you during and following your hospital stay and for a period of time after you leave the hospital. By working together, the health care providers are trying to more efficiently provide well-managed, high quality, patient-centered care as you undergo treatment. Hospitals, doctors, and other health care providers that care for you following a hospital stay may receive an additional payment for providing better, more coordinated health care. Medicare will monitor your care to make sure you and others get high quality care. Your feedback is important     Medicare may also ask you to answer a survey about the services and care you received from Abena Fairchild 18 will be mailed to you. Your feedback will improve care for all people with Medicare who receive care from [address] [place] [address]. Completion of this survey is optional.     Get more information     For more information about the Bundled Payments for [address] [place] [address], you can:    · Visit the CMS BPCI Advanced Website at http://ojeda-loomis.net/ initiatives/bpci-advanced   · Call the [facility] BPCI-A team at ([phone]. · Call 1-800-MEDICARE (0-276.952.7188). TTY users can call 3-560.415.9058     If you have concerns or complaints about your care, talk to your health care provider, or contact your Beneficiary and Family Centered Quality Improvement Organization [facility]). To get your CC-QIO's phone number, visit Medicare.gov/contacts or call 1-800-MEDICARE. · To find a different hospital, visit www. hospitalcompare.hhs.gov or call 1-800- MEDICARE (6-653.628.4396). TTY users should call 1-909.946.4196. · To find a different doctor, visit Medicare's Physician Compare website, [employer]ScoutTapes.co.nz, or call 1-800-MEDICARE (382 4353). TTY users should call 4-467.606.3603. · To find a different skilled nursing facility, visit [employer] website, https://www.TripleLift/, or call 1-800-MEDICARE (1- 485.133.6885). TTY users should call 9-712.164.6960. · To find a different long term care hospital, visit [facility] Compare website, [employer].FitWithMe, or call 1-800- MEDICARE (802 4882). TTY users should call 9-489.257.4701. · To find a different inpatient rehabilitation facility, visit [facility] Compare website, www.medicare.gov/ inpatientrehabilitation facilitycompare, or call 1-800-MEDICARE (8-882.553.8967). TTY users should call 4- 392.557.4082. · To find a different home health agency, visit [address] website, www.medicare.gov/homehealthcompare, or call 1-800-MEDICARE (1-190- 390-1171). TTY users should call [phone].

## 2019-05-28 ENCOUNTER — APPOINTMENT (OUTPATIENT)
Dept: GENERAL RADIOLOGY | Age: 69
DRG: 193 | End: 2019-05-28
Payer: MEDICARE

## 2019-05-28 ENCOUNTER — HOSPITAL ENCOUNTER (EMERGENCY)
Age: 69
Discharge: HOME | DRG: 193 | End: 2019-05-28
Attending: EMERGENCY MEDICINE
Payer: MEDICARE

## 2019-05-28 VITALS
RESPIRATION RATE: 16 BRPM | HEART RATE: 87 BPM | SYSTOLIC BLOOD PRESSURE: 127 MMHG | WEIGHT: 205 LBS | DIASTOLIC BLOOD PRESSURE: 79 MMHG | HEIGHT: 73 IN | BODY MASS INDEX: 27.17 KG/M2 | TEMPERATURE: 98.4 F | OXYGEN SATURATION: 94 %

## 2019-05-28 DIAGNOSIS — R07.9 CHEST PAIN, UNSPECIFIED TYPE: Primary | ICD-10-CM

## 2019-05-28 DIAGNOSIS — R06.02 SHORTNESS OF BREATH: ICD-10-CM

## 2019-05-28 LAB
ALBUMIN SERPL-MCNC: 4 G/DL (ref 3.5–5.2)
ALP BLD-CCNC: 71 U/L (ref 40–129)
ALT SERPL-CCNC: 9 U/L (ref 0–40)
ANION GAP SERPL CALCULATED.3IONS-SCNC: 13 MMOL/L (ref 7–16)
AST SERPL-CCNC: 12 U/L (ref 0–39)
B.E.: -1.8 MMOL/L (ref -3–3)
BASOPHILS ABSOLUTE: 0.05 E9/L (ref 0–0.2)
BASOPHILS RELATIVE PERCENT: 0.5 % (ref 0–2)
BILIRUB SERPL-MCNC: 1 MG/DL (ref 0–1.2)
BUN BLDV-MCNC: 12 MG/DL (ref 8–23)
CALCIUM SERPL-MCNC: 9.3 MG/DL (ref 8.6–10.2)
CHLORIDE BLD-SCNC: 98 MMOL/L (ref 98–107)
CO2: 24 MMOL/L (ref 22–29)
COHB: 2.5 % (ref 0–1.5)
CREAT SERPL-MCNC: 0.8 MG/DL (ref 0.7–1.2)
CRITICAL: ABNORMAL
DATE ANALYZED: ABNORMAL
DATE OF COLLECTION: ABNORMAL
EKG ATRIAL RATE: 94 BPM
EKG P AXIS: 3 DEGREES
EKG P-R INTERVAL: 160 MS
EKG Q-T INTERVAL: 368 MS
EKG QRS DURATION: 96 MS
EKG QTC CALCULATION (BAZETT): 460 MS
EKG R AXIS: 90 DEGREES
EKG T AXIS: 38 DEGREES
EKG VENTRICULAR RATE: 94 BPM
EOSINOPHILS ABSOLUTE: 0.12 E9/L (ref 0.05–0.5)
EOSINOPHILS RELATIVE PERCENT: 1.1 % (ref 0–6)
GFR AFRICAN AMERICAN: >60
GFR NON-AFRICAN AMERICAN: >60 ML/MIN/1.73
GLUCOSE BLD-MCNC: 102 MG/DL (ref 74–99)
HCO3: 21.3 MMOL/L (ref 22–26)
HCT VFR BLD CALC: 44.8 % (ref 37–54)
HEMOGLOBIN: 15.1 G/DL (ref 12.5–16.5)
HHB: 8.2 % (ref 0–5)
IMMATURE GRANULOCYTES #: 0.07 E9/L
IMMATURE GRANULOCYTES %: 0.6 % (ref 0–5)
LAB: ABNORMAL
LYMPHOCYTES ABSOLUTE: 1.18 E9/L (ref 1.5–4)
LYMPHOCYTES RELATIVE PERCENT: 10.9 % (ref 20–42)
Lab: ABNORMAL
MCH RBC QN AUTO: 30.6 PG (ref 26–35)
MCHC RBC AUTO-ENTMCNC: 33.7 % (ref 32–34.5)
MCV RBC AUTO: 90.7 FL (ref 80–99.9)
METHB: 0.2 % (ref 0–1.5)
MODE: ABNORMAL
MONOCYTES ABSOLUTE: 1.09 E9/L (ref 0.1–0.95)
MONOCYTES RELATIVE PERCENT: 10 % (ref 2–12)
NEUTROPHILS ABSOLUTE: 8.35 E9/L (ref 1.8–7.3)
NEUTROPHILS RELATIVE PERCENT: 76.9 % (ref 43–80)
O2 CONTENT: 19.6 ML/DL
O2 SATURATION: 91.6 % (ref 92–98.5)
O2HB: 89.1 % (ref 94–97)
OPERATOR ID: ABNORMAL
PATIENT TEMP: 37 C
PCO2: 32.1 MMHG (ref 35–45)
PDW BLD-RTO: 15.3 FL (ref 11.5–15)
PH BLOOD GAS: 7.44 (ref 7.35–7.45)
PLATELET # BLD: 207 E9/L (ref 130–450)
PMV BLD AUTO: 9.7 FL (ref 7–12)
PO2: 59.1 MMHG (ref 60–100)
POTASSIUM SERPL-SCNC: 4.1 MMOL/L (ref 3.5–5)
PRO-BNP: 233 PG/ML (ref 0–125)
RBC # BLD: 4.94 E12/L (ref 3.8–5.8)
SODIUM BLD-SCNC: 135 MMOL/L (ref 132–146)
SOURCE, BLOOD GAS: ABNORMAL
THB: 15.7 G/DL (ref 11.5–16.5)
TIME ANALYZED: 1417
TOTAL PROTEIN: 6.7 G/DL (ref 6.4–8.3)
TROPONIN: <0.01 NG/ML (ref 0–0.03)
WBC # BLD: 10.9 E9/L (ref 4.5–11.5)

## 2019-05-28 PROCEDURE — 80053 COMPREHEN METABOLIC PANEL: CPT

## 2019-05-28 PROCEDURE — 94664 DEMO&/EVAL PT USE INHALER: CPT

## 2019-05-28 PROCEDURE — 83880 ASSAY OF NATRIURETIC PEPTIDE: CPT

## 2019-05-28 PROCEDURE — 93010 ELECTROCARDIOGRAM REPORT: CPT | Performed by: INTERNAL MEDICINE

## 2019-05-28 PROCEDURE — 99285 EMERGENCY DEPT VISIT HI MDM: CPT

## 2019-05-28 PROCEDURE — 71046 X-RAY EXAM CHEST 2 VIEWS: CPT

## 2019-05-28 PROCEDURE — 93005 ELECTROCARDIOGRAM TRACING: CPT | Performed by: EMERGENCY MEDICINE

## 2019-05-28 PROCEDURE — 82805 BLOOD GASES W/O2 SATURATION: CPT

## 2019-05-28 PROCEDURE — 84484 ASSAY OF TROPONIN QUANT: CPT

## 2019-05-28 PROCEDURE — 85025 COMPLETE CBC W/AUTO DIFF WBC: CPT

## 2019-05-28 PROCEDURE — 6370000000 HC RX 637 (ALT 250 FOR IP): Performed by: EMERGENCY MEDICINE

## 2019-05-28 RX ORDER — IPRATROPIUM BROMIDE AND ALBUTEROL SULFATE 2.5; .5 MG/3ML; MG/3ML
1 SOLUTION RESPIRATORY (INHALATION)
Status: DISCONTINUED | OUTPATIENT
Start: 2019-05-28 | End: 2019-05-28

## 2019-05-28 RX ADMIN — IPRATROPIUM BROMIDE AND ALBUTEROL SULFATE 1 AMPULE: .5; 3 SOLUTION RESPIRATORY (INHALATION) at 14:31

## 2019-05-28 ASSESSMENT — PAIN SCALES - GENERAL: PAINLEVEL_OUTOF10: 8

## 2019-05-28 ASSESSMENT — PAIN DESCRIPTION - ONSET: ONSET: GRADUAL

## 2019-05-28 ASSESSMENT — PAIN DESCRIPTION - LOCATION: LOCATION: CHEST

## 2019-05-28 ASSESSMENT — PAIN DESCRIPTION - PROGRESSION: CLINICAL_PROGRESSION: GRADUALLY WORSENING

## 2019-05-28 ASSESSMENT — PAIN DESCRIPTION - PAIN TYPE: TYPE: ACUTE PAIN

## 2019-05-28 ASSESSMENT — PAIN DESCRIPTION - ORIENTATION: ORIENTATION: MID

## 2019-05-28 ASSESSMENT — PAIN DESCRIPTION - FREQUENCY: FREQUENCY: CONTINUOUS

## 2019-05-28 ASSESSMENT — PAIN DESCRIPTION - DESCRIPTORS: DESCRIPTORS: ACHING

## 2019-05-30 ENCOUNTER — APPOINTMENT (OUTPATIENT)
Dept: MRI IMAGING | Age: 69
DRG: 193 | End: 2019-05-30
Payer: MEDICARE

## 2019-05-30 ENCOUNTER — HOSPITAL ENCOUNTER (INPATIENT)
Age: 69
LOS: 4 days | Discharge: HOME | DRG: 193 | End: 2019-06-03
Attending: EMERGENCY MEDICINE | Admitting: INTERNAL MEDICINE
Payer: MEDICARE

## 2019-05-30 ENCOUNTER — APPOINTMENT (OUTPATIENT)
Dept: GENERAL RADIOLOGY | Age: 69
DRG: 193 | End: 2019-05-30
Payer: MEDICARE

## 2019-05-30 DIAGNOSIS — J44.1 COPD EXACERBATION (HCC): ICD-10-CM

## 2019-05-30 DIAGNOSIS — J96.21 ACUTE ON CHRONIC RESPIRATORY FAILURE WITH HYPOXIA (HCC): ICD-10-CM

## 2019-05-30 DIAGNOSIS — J18.9 HCAP (HEALTHCARE-ASSOCIATED PNEUMONIA): Primary | ICD-10-CM

## 2019-05-30 PROBLEM — J15.9 PNEUMONIA, BACTERIAL: Status: ACTIVE | Noted: 2019-05-30

## 2019-05-30 LAB
ALBUMIN SERPL-MCNC: 3.6 G/DL (ref 3.5–5.2)
ALP BLD-CCNC: 73 U/L (ref 40–129)
ALT SERPL-CCNC: 7 U/L (ref 0–40)
ANION GAP SERPL CALCULATED.3IONS-SCNC: 14 MMOL/L (ref 7–16)
APTT: 31.4 SEC (ref 24.5–35.1)
AST SERPL-CCNC: 12 U/L (ref 0–39)
BACTERIA: NORMAL /HPF
BASOPHILS ABSOLUTE: 0.06 E9/L (ref 0–0.2)
BASOPHILS RELATIVE PERCENT: 0.6 % (ref 0–2)
BILIRUB SERPL-MCNC: 0.9 MG/DL (ref 0–1.2)
BILIRUBIN URINE: ABNORMAL
BLOOD, URINE: NEGATIVE
BUN BLDV-MCNC: 13 MG/DL (ref 8–23)
C-REACTIVE PROTEIN: 12.2 MG/DL (ref 0–0.4)
CALCIUM SERPL-MCNC: 9.3 MG/DL (ref 8.6–10.2)
CASTS 2: NORMAL /LPF
CASTS: NORMAL /LPF
CHLORIDE BLD-SCNC: 98 MMOL/L (ref 98–107)
CLARITY: CLEAR
CO2: 23 MMOL/L (ref 22–29)
COLOR: YELLOW
CREAT SERPL-MCNC: 0.7 MG/DL (ref 0.7–1.2)
EKG ATRIAL RATE: 98 BPM
EKG P-R INTERVAL: 148 MS
EKG Q-T INTERVAL: 358 MS
EKG QRS DURATION: 100 MS
EKG QTC CALCULATION (BAZETT): 457 MS
EKG R AXIS: 45 DEGREES
EKG T AXIS: 30 DEGREES
EKG VENTRICULAR RATE: 98 BPM
EOSINOPHILS ABSOLUTE: 0.11 E9/L (ref 0.05–0.5)
EOSINOPHILS RELATIVE PERCENT: 1 % (ref 0–6)
EPITHELIAL CELLS, UA: NORMAL /HPF
GFR AFRICAN AMERICAN: >60
GFR NON-AFRICAN AMERICAN: >60 ML/MIN/1.73
GLUCOSE BLD-MCNC: 112 MG/DL (ref 74–99)
GLUCOSE URINE: 100 MG/DL
HCT VFR BLD CALC: 41.1 % (ref 37–54)
HEMOGLOBIN: 14.1 G/DL (ref 12.5–16.5)
IMMATURE GRANULOCYTES #: 0.06 E9/L
IMMATURE GRANULOCYTES %: 0.6 % (ref 0–5)
INR BLD: 1.1
KETONES, URINE: 15 MG/DL
LACTIC ACID: 0.9 MMOL/L (ref 0.5–2.2)
LEUKOCYTE ESTERASE, URINE: NEGATIVE
LIPASE: 16 U/L (ref 13–60)
LYMPHOCYTES ABSOLUTE: 1.1 E9/L (ref 1.5–4)
LYMPHOCYTES RELATIVE PERCENT: 10.3 % (ref 20–42)
MCH RBC QN AUTO: 30.5 PG (ref 26–35)
MCHC RBC AUTO-ENTMCNC: 34.3 % (ref 32–34.5)
MCV RBC AUTO: 89 FL (ref 80–99.9)
MONOCYTES ABSOLUTE: 1.2 E9/L (ref 0.1–0.95)
MONOCYTES RELATIVE PERCENT: 11.3 % (ref 2–12)
NEUTROPHILS ABSOLUTE: 8.13 E9/L (ref 1.8–7.3)
NEUTROPHILS RELATIVE PERCENT: 76.2 % (ref 43–80)
NITRITE, URINE: NEGATIVE
PDW BLD-RTO: 14.8 FL (ref 11.5–15)
PH UA: 5.5 (ref 5–9)
PLATELET # BLD: 216 E9/L (ref 130–450)
PMV BLD AUTO: 10 FL (ref 7–12)
POTASSIUM SERPL-SCNC: 3.8 MMOL/L (ref 3.5–5)
PROCALCITONIN: 0.11 NG/ML (ref 0–0.08)
PROTEIN UA: 30 MG/DL
PROTHROMBIN TIME: 12.5 SEC (ref 9.3–12.4)
RBC # BLD: 4.62 E12/L (ref 3.8–5.8)
RBC UA: NORMAL /HPF (ref 0–2)
SEDIMENTATION RATE, ERYTHROCYTE: 42 MM/HR (ref 0–15)
SODIUM BLD-SCNC: 135 MMOL/L (ref 132–146)
SPECIFIC GRAVITY UA: 1.02 (ref 1–1.03)
TOTAL PROTEIN: 6.5 G/DL (ref 6.4–8.3)
UROBILINOGEN, URINE: 1 E.U./DL
WBC # BLD: 10.7 E9/L (ref 4.5–11.5)
WBC UA: NORMAL /HPF (ref 0–5)

## 2019-05-30 PROCEDURE — 71045 X-RAY EXAM CHEST 1 VIEW: CPT

## 2019-05-30 PROCEDURE — 96375 TX/PRO/DX INJ NEW DRUG ADDON: CPT

## 2019-05-30 PROCEDURE — 2060000000 HC ICU INTERMEDIATE R&B

## 2019-05-30 PROCEDURE — 99285 EMERGENCY DEPT VISIT HI MDM: CPT

## 2019-05-30 PROCEDURE — A9579 GAD-BASE MR CONTRAST NOS,1ML: HCPCS | Performed by: RADIOLOGY

## 2019-05-30 PROCEDURE — 72158 MRI LUMBAR SPINE W/O & W/DYE: CPT

## 2019-05-30 PROCEDURE — 6360000002 HC RX W HCPCS: Performed by: EMERGENCY MEDICINE

## 2019-05-30 PROCEDURE — 85651 RBC SED RATE NONAUTOMATED: CPT

## 2019-05-30 PROCEDURE — 86140 C-REACTIVE PROTEIN: CPT

## 2019-05-30 PROCEDURE — 83605 ASSAY OF LACTIC ACID: CPT

## 2019-05-30 PROCEDURE — 80053 COMPREHEN METABOLIC PANEL: CPT

## 2019-05-30 PROCEDURE — 87070 CULTURE OTHR SPECIMN AEROBIC: CPT

## 2019-05-30 PROCEDURE — 85610 PROTHROMBIN TIME: CPT

## 2019-05-30 PROCEDURE — 84145 PROCALCITONIN (PCT): CPT

## 2019-05-30 PROCEDURE — 87205 SMEAR GRAM STAIN: CPT

## 2019-05-30 PROCEDURE — 6360000004 HC RX CONTRAST MEDICATION: Performed by: RADIOLOGY

## 2019-05-30 PROCEDURE — 85730 THROMBOPLASTIN TIME PARTIAL: CPT

## 2019-05-30 PROCEDURE — 81001 URINALYSIS AUTO W/SCOPE: CPT

## 2019-05-30 PROCEDURE — 96365 THER/PROPH/DIAG IV INF INIT: CPT

## 2019-05-30 PROCEDURE — 70030 X-RAY EYE FOR FOREIGN BODY: CPT

## 2019-05-30 PROCEDURE — 93010 ELECTROCARDIOGRAM REPORT: CPT | Performed by: INTERNAL MEDICINE

## 2019-05-30 PROCEDURE — 2580000003 HC RX 258: Performed by: EMERGENCY MEDICINE

## 2019-05-30 PROCEDURE — 83690 ASSAY OF LIPASE: CPT

## 2019-05-30 PROCEDURE — 36415 COLL VENOUS BLD VENIPUNCTURE: CPT

## 2019-05-30 PROCEDURE — 87186 SC STD MICRODIL/AGAR DIL: CPT

## 2019-05-30 PROCEDURE — 6360000002 HC RX W HCPCS: Performed by: INTERNAL MEDICINE

## 2019-05-30 PROCEDURE — 93005 ELECTROCARDIOGRAM TRACING: CPT | Performed by: EMERGENCY MEDICINE

## 2019-05-30 PROCEDURE — 87040 BLOOD CULTURE FOR BACTERIA: CPT

## 2019-05-30 PROCEDURE — 2580000003 HC RX 258: Performed by: INTERNAL MEDICINE

## 2019-05-30 PROCEDURE — 85025 COMPLETE CBC W/AUTO DIFF WBC: CPT

## 2019-05-30 RX ORDER — IBUPROFEN 400 MG/1
400 TABLET ORAL
Status: DISCONTINUED | OUTPATIENT
Start: 2019-05-31 | End: 2019-06-03 | Stop reason: HOSPADM

## 2019-05-30 RX ORDER — FORMOTEROL FUMARATE 20 UG/2ML
20 SOLUTION RESPIRATORY (INHALATION) 2 TIMES DAILY
Status: DISCONTINUED | OUTPATIENT
Start: 2019-05-30 | End: 2019-06-03 | Stop reason: HOSPADM

## 2019-05-30 RX ORDER — ONDANSETRON 2 MG/ML
4 INJECTION INTRAMUSCULAR; INTRAVENOUS EVERY 6 HOURS PRN
Status: DISCONTINUED | OUTPATIENT
Start: 2019-05-30 | End: 2019-06-03 | Stop reason: HOSPADM

## 2019-05-30 RX ORDER — BENZONATATE 100 MG/1
100 CAPSULE ORAL 3 TIMES DAILY
Status: DISCONTINUED | OUTPATIENT
Start: 2019-05-30 | End: 2019-06-03 | Stop reason: HOSPADM

## 2019-05-30 RX ORDER — PANTOPRAZOLE SODIUM 40 MG/1
40 TABLET, DELAYED RELEASE ORAL
Status: DISCONTINUED | OUTPATIENT
Start: 2019-05-31 | End: 2019-06-03 | Stop reason: HOSPADM

## 2019-05-30 RX ORDER — METHYLPREDNISOLONE SODIUM SUCCINATE 125 MG/2ML
80 INJECTION, POWDER, LYOPHILIZED, FOR SOLUTION INTRAMUSCULAR; INTRAVENOUS EVERY 8 HOURS
Status: DISCONTINUED | OUTPATIENT
Start: 2019-05-30 | End: 2019-05-31

## 2019-05-30 RX ORDER — SODIUM CHLORIDE 9 MG/ML
INJECTION, SOLUTION INTRAVENOUS CONTINUOUS
Status: DISCONTINUED | OUTPATIENT
Start: 2019-05-30 | End: 2019-06-03 | Stop reason: HOSPADM

## 2019-05-30 RX ORDER — IPRATROPIUM BROMIDE AND ALBUTEROL SULFATE 2.5; .5 MG/3ML; MG/3ML
1 SOLUTION RESPIRATORY (INHALATION)
Status: DISCONTINUED | OUTPATIENT
Start: 2019-05-31 | End: 2019-06-03 | Stop reason: HOSPADM

## 2019-05-30 RX ORDER — 0.9 % SODIUM CHLORIDE 0.9 %
1000 INTRAVENOUS SOLUTION INTRAVENOUS ONCE
Status: COMPLETED | OUTPATIENT
Start: 2019-05-30 | End: 2019-05-30

## 2019-05-30 RX ORDER — BUDESONIDE 0.25 MG/2ML
250 INHALANT ORAL 2 TIMES DAILY
Status: DISCONTINUED | OUTPATIENT
Start: 2019-05-30 | End: 2019-05-31

## 2019-05-30 RX ORDER — METHYLPREDNISOLONE SODIUM SUCCINATE 125 MG/2ML
125 INJECTION, POWDER, LYOPHILIZED, FOR SOLUTION INTRAMUSCULAR; INTRAVENOUS ONCE
Status: COMPLETED | OUTPATIENT
Start: 2019-05-30 | End: 2019-05-30

## 2019-05-30 RX ORDER — ACETAMINOPHEN 325 MG/1
650 TABLET ORAL EVERY 4 HOURS PRN
Status: DISCONTINUED | OUTPATIENT
Start: 2019-05-30 | End: 2019-06-03 | Stop reason: HOSPADM

## 2019-05-30 RX ADMIN — METHYLPREDNISOLONE SODIUM SUCCINATE 125 MG: 125 INJECTION, POWDER, FOR SOLUTION INTRAMUSCULAR; INTRAVENOUS at 13:26

## 2019-05-30 RX ADMIN — SODIUM CHLORIDE 1000 ML: 9 INJECTION, SOLUTION INTRAVENOUS at 11:32

## 2019-05-30 RX ADMIN — Medication 1.5 G: at 22:58

## 2019-05-30 RX ADMIN — SODIUM CHLORIDE: 9 INJECTION, SOLUTION INTRAVENOUS at 22:51

## 2019-05-30 RX ADMIN — GADOTERIDOL 19 ML: 279.3 INJECTION, SOLUTION INTRAVENOUS at 17:46

## 2019-05-30 RX ADMIN — CEFEPIME 2 G: 2 INJECTION, POWDER, FOR SOLUTION INTRAVENOUS at 19:43

## 2019-05-30 ASSESSMENT — PAIN SCALES - GENERAL: PAINLEVEL_OUTOF10: 0

## 2019-05-30 NOTE — ED PROVIDER NOTES
Department of Emergency Medicine   ED  Provider Note  Admit Date/RoomTime: 5/30/2019 11:02 AM  ED Room: 02/02          History of Present Illness:  5/30/19, Time: 12:26 PM         Yasemin Borges is a 79 y.o. male presenting to the ED for shortness of breath, beginning earlier today. The complaint has been persistent, moderate in severity, and worsened by nothing. Pt reports that he has shortness of breath. Reports that he is chronically on 3.5 liters at home. It was reported that pt was 71 % on RA. Pt also reports that he had a fever 1 day ago and chills. Reports that his temperature was 100.1 F. Pt has been coughing up phlegm with one episode of blood tinged phlegm. Was worried about possible pneumonia or infection from recent surgery. Pt had a back surgery on 4/1 and has had some drainage and redness to the area. Pt was seen in the ED 2 days ago. Was evaluated in the ED and discharged after results which were stable. Denies chest pain, leg swelling, abdominal pain, nausea, emesis, diarrhea, constipation, and further complaints at this time. Review of Systems:   Pertinent positives and negatives are stated within HPI, all other systems reviewed and are negative.      --------------------------------------------- PAST HISTORY ---------------------------------------------  Past Medical History:  has a past medical history of Acute and chronic respiratory failure with hypoxia ([address] Acute respiratory failure with hypoxia ([address] Bullous emphysema ([address] Colon cancer screening, COPD (chronic obstructive pulmonary disease) ([address] Cough with hemoptysis, Pulmonary emphysema with fibrosis of lung ([address] Right lower lobe pneumonia ([address] and Thoracic ascending aortic aneurysm ([address] Past Surgical History:  has a past surgical history that includes Abscess Drainage (2006); Colonoscopy (11/18/2013); and lumbar fusion (03/2019). Social History:  reports that he quit smoking about 4 years ago.  His smoking use included 15 mm/Hr   C-reactive protein   Result Value Ref Range    CRP 12.2 (H) 0.0 - 0.4 mg/dL   Microscopic Urinalysis   Result Value Ref Range    Casts MANY /LPF    CASTS 2 RARE /LPF    WBC, UA 0-1 0 - 5 /HPF    RBC, UA NONE 0 - 2 /HPF    Epi Cells NONE /HPF    Bacteria, UA NONE /HPF   EKG 12 Lead   Result Value Ref Range    Ventricular Rate 98 BPM    Atrial Rate 98 BPM    P-R Interval 148 ms    QRS Duration 100 ms    Q-T Interval 358 ms    QTc Calculation (Bazett) 457 ms    R Axis 45 degrees    T Axis 30 degrees       RADIOLOGY:  Interpreted by Radiologist.  XR Eye Foreign Body   Final Result   No radiopaque foreign body is identified      XR CHEST PORTABLE   Final Result   Significant COPD and bilateral parenchymal fibrosis as well as pleural   thickening in the right costophrenic angle. This is unchanged. New infiltrate suggested in left mid hemithorax               MRI LUMBAR SPINE W WO CONTRAST    (Results Pending)         EKG: This EKG is signed and interpreted by the EP. Time: 11:11  Rate: 98  Rhythm: normal sinus   Interpretation: no acute changes  Comparison: was normal    ------------------------- NURSING NOTES AND VITALS REVIEWED ---------------------------   The nursing notes within the ED encounter and vital signs as below have been reviewed by myself. /70   Pulse 94   Temp 97.3 °F (36.3 °C) (Oral)   Resp 18   Ht 6' 1\" (1.854 m)   Wt 205 lb (93 kg)   SpO2 93%   BMI 27.05 kg/m²   Oxygen Saturation Interpretation: Normal    The patients available past medical records and past encounters were reviewed.         ------------------------------ ED COURSE/MEDICAL DECISION MAKING----------------------  Medications   cefepime (MAXIPIME) 2 g IVPB extended (mini-bag) (2 g Intravenous New Bag 5/30/19 1943)   vancomycin 1.5 g in dextrose 5% 300 mL IVPB (has no administration in time range)   0.9 % sodium chloride bolus (0 mLs Intravenous Stopped 5/30/19 1326)   methylPREDNISolone sodium (SOLU-MEDROL) Vivien Cullen MD  05/30/19 2019

## 2019-05-31 ENCOUNTER — APPOINTMENT (OUTPATIENT)
Dept: CT IMAGING | Age: 69
DRG: 193 | End: 2019-05-31
Payer: MEDICARE

## 2019-05-31 LAB
ANION GAP SERPL CALCULATED.3IONS-SCNC: 15 MMOL/L (ref 7–16)
ANISOCYTOSIS: ABNORMAL
BASOPHILS ABSOLUTE: 0 E9/L (ref 0–0.2)
BASOPHILS RELATIVE PERCENT: 0 % (ref 0–2)
BUN BLDV-MCNC: 10 MG/DL (ref 8–23)
BURR CELLS: ABNORMAL
CALCIUM SERPL-MCNC: 9.3 MG/DL (ref 8.6–10.2)
CEA: 4.6 NG/ML (ref 0–5.2)
CHLORIDE BLD-SCNC: 98 MMOL/L (ref 98–107)
CHOLESTEROL, TOTAL: 133 MG/DL (ref 0–199)
CO2: 21 MMOL/L (ref 22–29)
CREAT SERPL-MCNC: 0.5 MG/DL (ref 0.7–1.2)
D DIMER: 754 NG/ML DDU
EOSINOPHILS ABSOLUTE: 0 E9/L (ref 0.05–0.5)
EOSINOPHILS RELATIVE PERCENT: 0 % (ref 0–6)
FILM ARRAY ADENOVIRUS: NORMAL
FILM ARRAY BORDETELLA PERTUSSIS: NORMAL
FILM ARRAY CHLAMYDOPHILIA PNEUMONIAE: NORMAL
FILM ARRAY CORONAVIRUS 229E: NORMAL
FILM ARRAY CORONAVIRUS HKU1: NORMAL
FILM ARRAY CORONAVIRUS NL63: NORMAL
FILM ARRAY CORONAVIRUS OC43: NORMAL
FILM ARRAY INFLUENZA A VIRUS 09H1: NORMAL
FILM ARRAY INFLUENZA A VIRUS H1: NORMAL
FILM ARRAY INFLUENZA A VIRUS H3: NORMAL
FILM ARRAY INFLUENZA A VIRUS: NORMAL
FILM ARRAY INFLUENZA B: NORMAL
FILM ARRAY METAPNEUMOVIRUS: NORMAL
FILM ARRAY MYCOPLASMA PNEUMONIAE: NORMAL
FILM ARRAY PARAINFLUENZA VIRUS 1: NORMAL
FILM ARRAY PARAINFLUENZA VIRUS 2: NORMAL
FILM ARRAY PARAINFLUENZA VIRUS 3: NORMAL
FILM ARRAY PARAINFLUENZA VIRUS 4: NORMAL
FILM ARRAY RESPIRATORY SYNCITIAL VIRUS: NORMAL
FILM ARRAY RHINOVIRUS/ENTEROVIRUS: NORMAL
FOLATE: 13.4 NG/ML (ref 4.8–24.2)
GFR AFRICAN AMERICAN: >60
GFR NON-AFRICAN AMERICAN: >60 ML/MIN/1.73
GLUCOSE BLD-MCNC: 244 MG/DL (ref 74–99)
HBA1C MFR BLD: 5.4 % (ref 4–5.6)
HCT VFR BLD CALC: 43.6 % (ref 37–54)
HDLC SERPL-MCNC: 50 MG/DL
HEMOGLOBIN: 14.5 G/DL (ref 12.5–16.5)
IMMATURE GRANULOCYTES #: 0.02 E9/L
IMMATURE GRANULOCYTES %: 0.2 % (ref 0–5)
LACTIC ACID, SEPSIS: 2.4 MMOL/L (ref 0.5–1.9)
LDL CHOLESTEROL CALCULATED: 72 MG/DL (ref 0–99)
LV EF: 60 %
LVEF MODALITY: NORMAL
LYMPHOCYTES ABSOLUTE: 0.3 E9/L (ref 1.5–4)
LYMPHOCYTES RELATIVE PERCENT: 3.5 % (ref 20–42)
MAGNESIUM: 2.1 MG/DL (ref 1.6–2.6)
MCH RBC QN AUTO: 29.8 PG (ref 26–35)
MCHC RBC AUTO-ENTMCNC: 33.3 % (ref 32–34.5)
MCV RBC AUTO: 89.7 FL (ref 80–99.9)
METER GLUCOSE: 195 MG/DL (ref 74–99)
METER GLUCOSE: 220 MG/DL (ref 74–99)
MONOCYTES ABSOLUTE: 0.09 E9/L (ref 0.1–0.95)
MONOCYTES RELATIVE PERCENT: 1.1 % (ref 2–12)
NEUTROPHILS ABSOLUTE: 8.11 E9/L (ref 1.8–7.3)
NEUTROPHILS RELATIVE PERCENT: 95.2 % (ref 43–80)
PDW BLD-RTO: 14.5 FL (ref 11.5–15)
PHOSPHORUS: 2.2 MG/DL (ref 2.5–4.5)
PLATELET # BLD: 261 E9/L (ref 130–450)
PMV BLD AUTO: 10 FL (ref 7–12)
POIKILOCYTES: ABNORMAL
POTASSIUM SERPL-SCNC: 3.7 MMOL/L (ref 3.5–5)
PRO-BNP: 181 PG/ML (ref 0–125)
PROCALCITONIN: 0.08 NG/ML (ref 0–0.08)
RBC # BLD: 4.86 E12/L (ref 3.8–5.8)
SODIUM BLD-SCNC: 134 MMOL/L (ref 132–146)
TRIGL SERPL-MCNC: 53 MG/DL (ref 0–149)
TROPONIN: <0.01 NG/ML (ref 0–0.03)
TSH SERPL DL<=0.05 MIU/L-ACNC: 0.47 UIU/ML (ref 0.27–4.2)
URIC ACID, SERUM: 3.5 MG/DL (ref 3.4–7)
VITAMIN B-12: 914 PG/ML (ref 211–946)
VLDLC SERPL CALC-MCNC: 11 MG/DL
WBC # BLD: 8.5 E9/L (ref 4.5–11.5)

## 2019-05-31 PROCEDURE — 6360000002 HC RX W HCPCS: Performed by: INTERNAL MEDICINE

## 2019-05-31 PROCEDURE — 6370000000 HC RX 637 (ALT 250 FOR IP): Performed by: INTERNAL MEDICINE

## 2019-05-31 PROCEDURE — 36415 COLL VENOUS BLD VENIPUNCTURE: CPT

## 2019-05-31 PROCEDURE — 80048 BASIC METABOLIC PNL TOTAL CA: CPT

## 2019-05-31 PROCEDURE — 87450 HC DIRECT STREP B ANTIGEN: CPT

## 2019-05-31 PROCEDURE — 80061 LIPID PANEL: CPT

## 2019-05-31 PROCEDURE — 84484 ASSAY OF TROPONIN QUANT: CPT

## 2019-05-31 PROCEDURE — 2060000000 HC ICU INTERMEDIATE R&B

## 2019-05-31 PROCEDURE — 93306 TTE W/DOPPLER COMPLETE: CPT

## 2019-05-31 PROCEDURE — 6360000002 HC RX W HCPCS: Performed by: SPECIALIST

## 2019-05-31 PROCEDURE — 87070 CULTURE OTHR SPECIMN AEROBIC: CPT

## 2019-05-31 PROCEDURE — 87081 CULTURE SCREEN ONLY: CPT

## 2019-05-31 PROCEDURE — 97161 PT EVAL LOW COMPLEX 20 MIN: CPT

## 2019-05-31 PROCEDURE — 83735 ASSAY OF MAGNESIUM: CPT

## 2019-05-31 PROCEDURE — 87581 M.PNEUMON DNA AMP PROBE: CPT

## 2019-05-31 PROCEDURE — 94664 DEMO&/EVAL PT USE INHALER: CPT

## 2019-05-31 PROCEDURE — 82962 GLUCOSE BLOOD TEST: CPT

## 2019-05-31 PROCEDURE — 6370000000 HC RX 637 (ALT 250 FOR IP): Performed by: SPECIALIST

## 2019-05-31 PROCEDURE — 87486 CHLMYD PNEUM DNA AMP PROBE: CPT

## 2019-05-31 PROCEDURE — 85378 FIBRIN DEGRADE SEMIQUANT: CPT

## 2019-05-31 PROCEDURE — 82378 CARCINOEMBRYONIC ANTIGEN: CPT

## 2019-05-31 PROCEDURE — 82607 VITAMIN B-12: CPT

## 2019-05-31 PROCEDURE — 84550 ASSAY OF BLOOD/URIC ACID: CPT

## 2019-05-31 PROCEDURE — 85025 COMPLETE CBC W/AUTO DIFF WBC: CPT

## 2019-05-31 PROCEDURE — 84443 ASSAY THYROID STIM HORMONE: CPT

## 2019-05-31 PROCEDURE — 84145 PROCALCITONIN (PCT): CPT

## 2019-05-31 PROCEDURE — 82746 ASSAY OF FOLIC ACID SERUM: CPT

## 2019-05-31 PROCEDURE — 2500000003 HC RX 250 WO HCPCS: Performed by: INTERNAL MEDICINE

## 2019-05-31 PROCEDURE — 2580000003 HC RX 258: Performed by: INTERNAL MEDICINE

## 2019-05-31 PROCEDURE — 83036 HEMOGLOBIN GLYCOSYLATED A1C: CPT

## 2019-05-31 PROCEDURE — 87633 RESP VIRUS 12-25 TARGETS: CPT

## 2019-05-31 PROCEDURE — 84100 ASSAY OF PHOSPHORUS: CPT

## 2019-05-31 PROCEDURE — 83605 ASSAY OF LACTIC ACID: CPT

## 2019-05-31 PROCEDURE — 2700000000 HC OXYGEN THERAPY PER DAY

## 2019-05-31 PROCEDURE — 87798 DETECT AGENT NOS DNA AMP: CPT

## 2019-05-31 PROCEDURE — 71250 CT THORAX DX C-: CPT

## 2019-05-31 PROCEDURE — 94640 AIRWAY INHALATION TREATMENT: CPT

## 2019-05-31 PROCEDURE — 83880 ASSAY OF NATRIURETIC PEPTIDE: CPT

## 2019-05-31 PROCEDURE — 97165 OT EVAL LOW COMPLEX 30 MIN: CPT

## 2019-05-31 PROCEDURE — 87206 SMEAR FLUORESCENT/ACID STAI: CPT

## 2019-05-31 PROCEDURE — 2580000003 HC RX 258: Performed by: SPECIALIST

## 2019-05-31 RX ORDER — METHYLPREDNISOLONE SODIUM SUCCINATE 40 MG/ML
40 INJECTION, POWDER, LYOPHILIZED, FOR SOLUTION INTRAMUSCULAR; INTRAVENOUS EVERY 8 HOURS
Status: DISCONTINUED | OUTPATIENT
Start: 2019-05-31 | End: 2019-06-01

## 2019-05-31 RX ORDER — DEXTROSE MONOHYDRATE 25 G/50ML
12.5 INJECTION, SOLUTION INTRAVENOUS PRN
Status: DISCONTINUED | OUTPATIENT
Start: 2019-05-31 | End: 2019-06-03 | Stop reason: HOSPADM

## 2019-05-31 RX ORDER — NICOTINE POLACRILEX 4 MG
15 LOZENGE BUCCAL PRN
Status: DISCONTINUED | OUTPATIENT
Start: 2019-05-31 | End: 2019-06-03 | Stop reason: HOSPADM

## 2019-05-31 RX ORDER — BUDESONIDE 0.25 MG/2ML
1000 INHALANT ORAL 2 TIMES DAILY
Status: DISCONTINUED | OUTPATIENT
Start: 2019-05-31 | End: 2019-06-03 | Stop reason: HOSPADM

## 2019-05-31 RX ORDER — DEXTROSE MONOHYDRATE 50 MG/ML
100 INJECTION, SOLUTION INTRAVENOUS PRN
Status: DISCONTINUED | OUTPATIENT
Start: 2019-05-31 | End: 2019-06-03 | Stop reason: HOSPADM

## 2019-05-31 RX ADMIN — METHYLPREDNISOLONE SODIUM SUCCINATE 80 MG: 125 INJECTION, POWDER, LYOPHILIZED, FOR SOLUTION INTRAMUSCULAR; INTRAVENOUS at 00:28

## 2019-05-31 RX ADMIN — FORMOTEROL FUMARATE DIHYDRATE 20 MCG: 20 SOLUTION RESPIRATORY (INHALATION) at 09:27

## 2019-05-31 RX ADMIN — METHYLPREDNISOLONE SODIUM SUCCINATE 40 MG: 40 INJECTION, POWDER, FOR SOLUTION INTRAMUSCULAR; INTRAVENOUS at 14:54

## 2019-05-31 RX ADMIN — IBUPROFEN 400 MG: 400 TABLET, FILM COATED ORAL at 16:00

## 2019-05-31 RX ADMIN — VANCOMYCIN HYDROCHLORIDE 2000 MG: 10 INJECTION, POWDER, LYOPHILIZED, FOR SOLUTION INTRAVENOUS at 19:29

## 2019-05-31 RX ADMIN — BUDESONIDE 250 MCG: 0.25 SUSPENSION RESPIRATORY (INHALATION) at 09:27

## 2019-05-31 RX ADMIN — BENZONATATE 100 MG: 100 CAPSULE ORAL at 00:28

## 2019-05-31 RX ADMIN — IPRATROPIUM BROMIDE AND ALBUTEROL SULFATE 1 AMPULE: .5; 3 SOLUTION RESPIRATORY (INHALATION) at 16:10

## 2019-05-31 RX ADMIN — IBUPROFEN 400 MG: 400 TABLET, FILM COATED ORAL at 08:49

## 2019-05-31 RX ADMIN — BENZONATATE 100 MG: 100 CAPSULE ORAL at 13:31

## 2019-05-31 RX ADMIN — COLLAGENASE SANTYL: 250 OINTMENT TOPICAL at 17:21

## 2019-05-31 RX ADMIN — PANTOPRAZOLE SODIUM 40 MG: 40 TABLET, DELAYED RELEASE ORAL at 07:09

## 2019-05-31 RX ADMIN — IPRATROPIUM BROMIDE AND ALBUTEROL SULFATE 1 AMPULE: .5; 3 SOLUTION RESPIRATORY (INHALATION) at 09:27

## 2019-05-31 RX ADMIN — IBUPROFEN 400 MG: 400 TABLET, FILM COATED ORAL at 11:20

## 2019-05-31 RX ADMIN — BENZONATATE 100 MG: 100 CAPSULE ORAL at 08:49

## 2019-05-31 RX ADMIN — POTASSIUM PHOSPHATE, MONOBASIC AND POTASSIUM PHOSPHATE, DIBASIC 15 MMOL: 224; 236 INJECTION, SOLUTION, CONCENTRATE INTRAVENOUS at 21:46

## 2019-05-31 RX ADMIN — CEFEPIME 2 G: 2 INJECTION, POWDER, FOR SOLUTION INTRAVENOUS at 14:56

## 2019-05-31 RX ADMIN — METHYLPREDNISOLONE SODIUM SUCCINATE 40 MG: 40 INJECTION, POWDER, FOR SOLUTION INTRAMUSCULAR; INTRAVENOUS at 23:48

## 2019-05-31 RX ADMIN — FORMOTEROL FUMARATE DIHYDRATE 20 MCG: 20 SOLUTION RESPIRATORY (INHALATION) at 21:20

## 2019-05-31 RX ADMIN — BUDESONIDE 1000 MCG: 0.25 SUSPENSION RESPIRATORY (INHALATION) at 21:20

## 2019-05-31 RX ADMIN — IPRATROPIUM BROMIDE AND ALBUTEROL SULFATE 1 AMPULE: .5; 3 SOLUTION RESPIRATORY (INHALATION) at 12:11

## 2019-05-31 RX ADMIN — BENZONATATE 100 MG: 100 CAPSULE ORAL at 20:51

## 2019-05-31 RX ADMIN — IPRATROPIUM BROMIDE AND ALBUTEROL SULFATE 1 AMPULE: .5; 3 SOLUTION RESPIRATORY (INHALATION) at 21:20

## 2019-05-31 RX ADMIN — ENOXAPARIN SODIUM 40 MG: 40 INJECTION SUBCUTANEOUS at 08:49

## 2019-05-31 RX ADMIN — METHYLPREDNISOLONE SODIUM SUCCINATE 80 MG: 125 INJECTION, POWDER, LYOPHILIZED, FOR SOLUTION INTRAMUSCULAR; INTRAVENOUS at 07:09

## 2019-05-31 ASSESSMENT — PAIN SCALES - GENERAL
PAINLEVEL_OUTOF10: 0

## 2019-05-31 NOTE — FLOWSHEET NOTE
Inpatient Wound Care    Admit Date: 5/30/2019 11:02 AM    Reason for consult:  Back    Significant history:  Admitted with pneumonia. History includes: COPD, Aneurysm. Wound history:  POA    Findings:       05/31/19 1417   Wound 05/31/19 Back   Date First Assessed/Time First Assessed: 05/31/19 1417   Present on Hospital Admission: Yes  Location: Back   Wound Non-Healing Surgical   Dressing Status Changed   Dressing Changed Changed/New   Dressing/Treatment Foam   Wound Cleansed Rinsed/Irrigated with saline   Dressing Change Due 06/02/19   Wound Length (cm) 4 cm   Wound Width (cm) 1 cm   Wound Depth (cm)   (obscured)   Wound Surface Area (cm^2) 4 cm^2   Wound Assessment Slough   Drainage Amount None   Odor None       Impression:  Gaping surgical wound. Interventions in place:  Pt states he had back surgery in April. Incision has opened. Pt states he followed up with his surgeon (from [place]) who wanted it covered with a dry dressing. Cleansed with NSS then mepilex foam applied. Pt has an apt next week with the surgeon. Plan: Mepilex to back incision.        Gwen Leyva 5/31/2019 2:19 PM

## 2019-05-31 NOTE — H&P
ABSCESS    COLONOSCOPY  11/18/2013    LUMBAR FUSION  03/2019    [facility]       Medications Prior to Admission:    Medications Prior to Admission: Fluticasone-Umeclidin-Vilant (TRELEGY ELLIPTA) 100-62.5-25 MCG/INH AEPB, Inhale 1 puff into the lungs daily (Patient taking differently: Inhale 1 puff into the lungs every morning )  OXYGEN, Inhale 3.5 L into the lungs continuous prn   etodolac (LODINE) 500 MG tablet, Take 500 mg by mouth every morning   albuterol sulfate  (90 Base) MCG/ACT inhaler, Inhale 2 puffs into the lungs every 6 hours as needed for Wheezing or Shortness of Breath    Allergies:    Patient has no known allergies. Social History:    reports that he quit smoking about 4 years ago. His smoking use included cigarettes. He started smoking about 50 years ago. He has a 92.00 pack-year smoking history. He has never used smokeless tobacco. He reports that he does not drink alcohol or use drugs. Family History:   family history includes Other in his father and mother. REVIEW OF SYSTEMS:  As above in the HPI, otherwise negative    PHYSICAL EXAM:    VS: /71   Pulse 78   Temp 97.4 °F (36.3 °C) (Oral)   Resp 18   Ht 6' 1\" (1.854 m)   Wt 205 lb (93 kg)   SpO2 90%   BMI 27.05 kg/m²     General appearance: Alert, Awake, Oriented times 3, no distress  Skin: Warm and dry ; no rashes  Head: Normocephalic. No masses, lesions or tenderness noted  Eyes: Conjunctivae pink, sclera white. PERRL,EOM-I  Ears: External ears normal  Nose/Sinuses: Nares normal. Septum midline. Mucosa normal. No drainage  Oropharynx: Oropharynx clear with no exudate seen  Neck: Supple. No jugular venous distension, lymphadenopathy or thyromegaly Trachea midline  Lungs: Mostly clear; decreased excursion; rhonchi left base. Heart: S1 S2  Regular rate and rhythm. No rub, murmur or gallop  Abdomen: Soft, minimal epigastric tenderness to palpation.  BS normal. No masses, organomegaly; no rebound or guarding  Extremities: No edema, Peripheral pulses palpable  Musculoskeletal: Muscular strength appears intact. Neuro:  No focal motor defects ; II-XII grossly intact . GLASER equally  Breast: deferred  Rectal: deferred  Genitalia:  Deferred  Back: 3 cm vertical wound L3 area no evidence of infection or drainage at this time.  Granulation tissue present    LABS:  CBC:   Lab Results   Component Value Date    WBC 8.5 05/31/2019    RBC 4.86 05/31/2019    HGB 14.5 05/31/2019    HCT 43.6 05/31/2019    MCV 89.7 05/31/2019    MCH 29.8 05/31/2019    MCHC 33.3 05/31/2019    RDW 14.5 05/31/2019     05/31/2019    MPV 10.0 05/31/2019     CBC with Differential:    Lab Results   Component Value Date    WBC 8.5 05/31/2019    RBC 4.86 05/31/2019    HGB 14.5 05/31/2019    HCT 43.6 05/31/2019     05/31/2019    MCV 89.7 05/31/2019    MCH 29.8 05/31/2019    MCHC 33.3 05/31/2019    RDW 14.5 05/31/2019    LYMPHOPCT 3.5 05/31/2019    MONOPCT 1.1 05/31/2019    BASOPCT 0.0 05/31/2019    MONOSABS 0.09 05/31/2019    LYMPHSABS 0.30 05/31/2019    EOSABS 0.00 05/31/2019    BASOSABS 0.00 05/31/2019     Hemoglobin/Hematocrit:    Lab Results   Component Value Date    HGB 14.5 05/31/2019    HCT 43.6 05/31/2019     CMP:    Lab Results   Component Value Date     05/31/2019    K 3.7 05/31/2019    K 4.4 04/22/2019    CL 98 05/31/2019    CO2 21 05/31/2019    BUN 10 05/31/2019    CREATININE 0.5 05/31/2019    GFRAA >60 05/31/2019    LABGLOM >60 05/31/2019    GLUCOSE 244 05/31/2019    PROT 6.5 05/30/2019    LABALBU 3.6 05/30/2019    CALCIUM 9.3 05/31/2019    BILITOT 0.9 05/30/2019    ALKPHOS 73 05/30/2019    AST 12 05/30/2019    ALT 7 05/30/2019     BMP:    Lab Results   Component Value Date     05/31/2019    K 3.7 05/31/2019    K 4.4 04/22/2019    CL 98 05/31/2019    CO2 21 05/31/2019    BUN 10 05/31/2019    LABALBU 3.6 05/30/2019    CREATININE 0.5 05/31/2019    CALCIUM 9.3 05/31/2019    GFRAA >60 05/31/2019    LABGLOM >60

## 2019-05-31 NOTE — CONSULTS
Pulmonary Consultation    Admit Date: 5/30/2019  Requesting Physician: Tracee Blake MD    CC:  · Left upper lobe infiltrate  HPI:  · Mr. Mark Pearl is a 82-year-old white male with end-stage chronic obstructive pulmonary disease secondary to tobacco abuse. He quit smoking several years ago. The patient was recently admitted for an exacerbation of COPD. At that time, a chest radiograph showed hyperinflation and a CT scan confirmed emphysematous changes. · Over the past week, the patient's been increasingly short of breath and has had a cough. He presented to the emergency room a few days back and was sent home. Shortly thereafter, he describes shaking chills and temperature 100.1 degrees. He again presented to the emergency room. This time, chest radiograph showed a left upper lobe infiltrate. He is currently admitted to a stepdown area. · Patient has had no hemoptysis or asymmetric swelling of the lower extremities. He recently had back surgery. PMH:    Past Medical History:   Diagnosis Date    Acute and chronic respiratory failure with hypoxia ([address] 10/12/2017    Acute respiratory failure with hypoxia ([address] 11/12/2018    Bullous emphysema ([address] 11/12/2018    Colon cancer screening     COPD (chronic obstructive pulmonary disease) (HCC)     Cough with hemoptysis 4/23/2019    Pulmonary emphysema with fibrosis of lung ([address] 11/15/2018    Right lower lobe pneumonia ([address] 11/15/2018    Recurrent 4/23/19    Thoracic ascending aortic aneurysm ([address] 11/15/2018    Proximal ascending aorta; 3.8 cm; 11/15/18     PSH:   Past Surgical History:   Procedure Laterality Date    ABSCESS DRAINAGE  2006    X2 RECTAL ABSCESS    COLONOSCOPY  11/18/2013    LUMBAR FUSION  03/2019    [facility]       Review of Systems:    · Constitutional: As noted in the HPI. Denies fever or chills. · Eyes: No visual changes or diplopia. No scleral icterus. · ENT: No headaches, hearing loss or vertigo.  No SpO2: 92 %  24HR PULSE OXIMETRY RANGE:  SpO2  Av.5 %  Min: 91 %  Max: 95 %    EXAM:  General: No distress. Alert. Eyes: PERRL. No sclera icterus. No conjunctival injection. ENT: No discharge. Pharynx clear. Neck: Trachea midline. Normal thyroid. No jvd, no hjr. Resp: No wheezing. No accessory muscle use. No rales. No rhonchi. CV: Regular rate. Regular rhythm. No murmur No rub. Abd: Non-tender. Non-distended. No masses. No organmegaly. Normal bowel sounds. Skin: Warm and dry. No nodule on exposed extremities. No rash on exposed extremities. Lymph: No cervical LAD. No supraclavicular LAD. Ext: No joint deformity. No clubbing. No cyanosis. No edema  Neuro: Awake. Follows commands. Positive pupils/gag/corneals. Normal pain response. Lab Results:  CBC:   Recent Labs     19  1256 19  1138 19  0900   WBC 10.9 10.7 8.5   HGB 15.1 14.1 14.5   HCT 44.8 41.1 43.6   MCV 90.7 89.0 89.7    216 261       BMP:  Recent Labs     19  1256 19  1138 19  0900    135 134   K 4.1 3.8 3.7   CL 98 98 98   CO2 24 23 21*   PHOS  --   --  2.2*   BUN 12 13 10   CREATININE 0.8 0.7 0.5*    ALB:3,BILIDIR:3,BILITOT:3,ALKPHOS:3)@    PT/INR:   Recent Labs     19  1138   PROTIME 12.5*   INR 1.1       Cultures:  Sputum: not available  Blood: not available    ABG:   Recent Labs     19  1417   PH 7.440   PO2 59.1*   PCO2 32.1*   HCO3 21.3*   BE -1.8   O2SAT 91.6*   METHB 0.2   O2HB 89.1*   COHB 2.5*   O2CON 19.6   HHB 8.2*   THB 15.7             Films:     XR Eye Foreign Body   Final Result   No radiopaque foreign body is identified      XR CHEST PORTABLE   Final Result   Significant COPD and bilateral parenchymal fibrosis as well as pleural   thickening in the right costophrenic angle. This is unchanged. New infiltrate suggested in left mid hemithorax               MRI LUMBAR SPINE W WO CONTRAST    (Results Pending)     . Assessment:  1.  Left upper lobe

## 2019-05-31 NOTE — PROGRESS NOTES
requires further education in this area   yes yes yes     Rehab potential is Good for reaching above PT goals. Pts/ family goals   1. To breathe better and go home. Patient and or family understand(s) diagnosis, prognosis, and plan of care. PLAN  PT care will be provided in accordance with the objectives noted above. Whenever appropriate, clear delegation orders will be provided for nursing staff. Exercises and functional mobility practice will be used as well as appropriate assistive devices or modalities to obtain goals. Patient and family education will also be administered as needed. Frequency of treatments will be 2-5x/week x 3-5 days. Time in: 10:00  Time out: 10:10    Mayur Martinez., P.T.    License number:  [id number]

## 2019-05-31 NOTE — CONSULTS
Inpatient consult to Infectious Diseases  Consult performed by: Trevon Larose MD  Consult ordered by: Bruna Beltrán DO        [address] [place] [address] [facility]  Consultation Note     Admit Date: 5/30/2019 11:02 AM    Reason for Consult: Wound infection? ? Recent back surgery. Wound continues    Attending Physician:  Bruna Beltrán DO    HISTORY OF PRESENT ILLNESS:             The history is obtained from extensive review of available past medical records. The patient is a 79 y.o. male who was admitted to [facility] between 4/22/19 and 4/26/19 with pneumonia. Seen by pulmonary. He was discharged on Levofloxacin and a prednisone taper. He was seen in follow-up by pulmonary in the office. He was breathing much better. The patient comes back to the ED on 5/28/19 with shortness of breath and chest pain. The patient was discharged home with a diagnosis of chest pain and shortness of breath. The patient came back to the ED on 5/30/19 with shortness of breath and a low-grade fever of 101°F. He also had some chills at home. His white count was normal and he was afebrile. The patient was treated with Cefepime and Vancomycin. The patient was seen in consultation by pulmonary. Was thought to have a left upper lobe pneumonia. The patient has had lumbar fusion at [facility] on 4/1/19. He was noted to have an open wound with some necrotic tissue. When he had seen his surgeon he was told that he may need to have debridement of the necrotic tissue. ID is being asked to see him in consultation.     Past Medical History:        Diagnosis Date    Acute and chronic respiratory failure with hypoxia ([address] 10/12/2017    Acute respiratory failure with hypoxia ([address] 11/12/2018    Bullous emphysema ([address] 11/12/2018    Colon cancer screening     COPD (chronic obstructive pulmonary disease) (HCC)     Cough with hemoptysis 4/23/2019    Pulmonary emphysema with fibrosis of lung ([address] 11/15/2018    Right activity:     Days per week: None     Minutes per session: None    Stress: None   Relationships    Social connections:     Talks on phone: None     Gets together: None     Attends Latter day service: None     Active member of club or organization: None     Attends meetings of clubs or organizations: None     Relationship status: None    Intimate partner violence:     Fear of current or ex partner: None     Emotionally abused: None     Physically abused: None     Forced sexual activity: None   Other Topics Concern    None   Social History Narrative    None      Pets: 2 dogs and a parrot  Travel: None  Patient works as a     Family History:       Problem Relation Age of Onset    Other Mother          age 80    Other Father          age 80   . Otherwise non-pertinent to the chief complaint. REVIEW OF SYSTEMS:    Constitutional: Negative for fevers, chills, diaphoresis  Neurologic: Negative   Psychiatric: Negative  Rheumatologic: Negative   Endocrine: Negative  Hematologic: Negative  Immunologic: Negative  ENT: Negative  Respiratory: As in the HPI  Cardiovascular: Negative  GI: Negative  : Negative  Musculoskeletal: Negative  Skin: As in the HPI    PHYSICAL EXAM:    Vitals:   /60   Pulse 94   Temp 97.8 °F (36.6 °C) (Oral)   Resp 18   Ht 6' 1\" (1.854 m)   Wt 205 lb (93 kg)   SpO2 90%   BMI 27.05 kg/m²   Constitutional: The patient is awake, alert, and oriented. Sitting in bed. Receiving a respiratory treatment. No distress. Skin: Warm and dry. No rashes were noted. HEENT: Eyes show round, and reactive pupils. No jaundice. Moist mucous membranes, no ulcerations, no thrush. Neck: Supple to movements. No lymphadenopathy. Chest: No use of accessory muscles to breathe. Symmetrical expansion. Auscultation reveals no wheezing, crackles, or rhonchi. Cardiovascular: S1 and S2 are rhythmic and regular. No murmurs appreciated. Abdomen: Positive bowel sounds to auscultation.  Benign 05/28/2019     Radiology:  X-rays reviewed    Microbiology:  Blood cultures 5/30/19 negative so far  Urinary antigens: Pending  Wound culture labeled lumbar spine M_SA    Recent Labs     05/30/19  1138 05/31/19  0900   PROCAL 0.11* 0.08       Assessment:  · Left upper lobe healthcare associated pneumonia. Previously admitted in April 2019. Treated and discharged with Levofloxacin. The patient does have a bird at home. His antibiotic should have taken care of that infection  · Status post perfusion. Wound partially dehisced and with a necrotic eschar. Colonized with Staphylococcus aureus. Not infected    Plan:    · Start Vancomycin and Cefepime  · Check cultures, baseline ESR, CRP  · Nares swab for MRSA  · Urinary antigens  · Santyl to lumbar wound or enzymatic debridement  · Respiratory panel  · Will follow with you    Thank you for having us see this patient in consultation. I will be discussing this case with the treating physicians.     Andi Mendoza  4:05 PM  5/31/2019

## 2019-05-31 NOTE — CONSULTS
CARDIOLOGY CONSULTATION    Patient Name:  Savanna Mccormack    :  1950    Reason for Consultation:   Shortness of breath rule out cardiac source    History of Present Illness:   Savanna Mccormack returns to [facility] having been discharged approximately one month ago with pneumonia. He has known chronic obstructive pulmonary disease associated with his long-standing history of smoking which he quit approximately 5 years ago. He denies any exertional chest pressure nocturnal dyspnea but is excessively fatigued especially after on Memorial Day. He has no previous cardiac history. Past Medical History:   has a past medical history of Acute and chronic respiratory failure with hypoxia ([facility]), Acute respiratory failure with hypoxia ([address] Bullous emphysema ([facility]), Colon cancer screening, COPD (chronic obstructive pulmonary disease) ([facility]), Cough with hemoptysis, Pulmonary emphysema with fibrosis of lung ([address] Right lower lobe pneumonia ([address] and Thoracic ascending aortic aneurysm ([address] Surgical History:   has a past surgical history that includes Abscess Drainage (); Colonoscopy (2013); and lumbar fusion (2019). Social History:   reports that he quit smoking about 4 years ago. His smoking use included cigarettes. He started smoking about 50 years ago. He has a 92.00 pack-year smoking history. He has never used smokeless tobacco. He reports that he does not drink alcohol or use drugs. Family History:  family history includes Other in his father and mother. Both parents  secondary to meniscus of old age. Medications:  Prior to Admission medications    Medication Sig Start Date End Date Taking?  Authorizing Provider   Fluticasone-Umeclidin-Vilant (TRELEGY ELLIPTA) 100-62.5-25 MCG/INH AEPB Inhale 1 puff into the lungs daily  Patient taking differently: Inhale 1 puff into the lungs every morning  19  Yes MIGUE Cam - CNP   OXYGEN Inhale 3.5 L into appearance: Well preserved, mesomorphic body habitus, alert, no distress. Skin: Skin color, texture, turgor normal. No rashes or lesions. No induration or tightening palpated. Head: Normocephalic. No masses, lesions, tenderness or abnormalities  Eyes: Conjunctivae/corneas clear. PERRL, EOMs intact. Sclera non icteric. Ears: External ears normal. Canals clear. TM's clear bilaterally. Hearing normal to finger rub. Nose/Sinuses: Nares normal. Septum midline. Mucosa normal. No drainage or sinus tenderness. Oropharynx: Lips, mucosa, and tongue normal. Oropharynx clear with no exudate seen. Neck: Neck supple and symmetric. No adenopathy. Thyroid symmetric, normal size, without nodules. Trachea is midline. Carotids brisk in upstroke without bruits, no abnormal JVP noted at 45°. Chest: Even excursion  Lungs: Lungs with coarse expiratory rhonchi to auscultation bilaterally. No retractions or use of accessory muscles. No tactile vocal fremitus. , crackles or rales. Heart:  S1 > S2. Regular rhythm. No gallop or murmur. No rub, palpable thrill or heave noted. PMI 5th intercostal space midclavicular line. Abdomen: Abdomen soft, mildly protuberant, non-tender. BS normal. No masses, organomegaly. No hernia noted. Extremities: Extremities normal. No deformities, edema, or skin discoloration. No cyanosis or clubbing noted to the nails. Peripheral pulses present 2+ upper extremities and present 2+  lower extremities. Musculoskeletal: Spine ROM normal. Muscular strength intact. Neuro: Cranial nerves intact. Motor: Strength 5/5 in all extremities. Reflexes 2+ in all extremities. No focal weakness. Sensory: grossly normal to touch. Coordination intact.     Pertinent Labs:  CBC:   Recent Labs     05/28/19  1256 05/30/19  1138 05/31/19  0900   WBC 10.9 10.7 8.5   HGB 15.1 14.1 14.5    216 261     BMP:  Recent Labs     05/28/19  1256 05/30/19  1138 05/31/19  0900    135 134   K 4.1 3.8 3.7   CL 98 98 98   CO2 24 23 21*   BUN 12 13 10   CREATININE 0.8 0.7 0.5*   GLUCOSE 102* 112* 244*   LABGLOM >60 >60 >60     ABGs:   Lab Results   Component Value Date    PH 7.440 2019    PO2 59.1 2019    PCO2 32.1 2019     INR:   Recent Labs     19  1138   INR 1.1     PRO-BNP:   Lab Results   Component Value Date    PROBNP 181 (H) 2019    PROBNP 233 (H) 2019      Cardiac Injury Profile:   Recent Labs     19  1256 19  0900   TROPONINI <0.01 <0.01      Lipid Profile:   Lab Results   Component Value Date    TRIG 53 2019    HDL 50 2019    LDLCALC 72 2019    CHOL 133 2019      Hemoglobin A1C: No components found for: HGBA1C   ECG:  See report  ECHO: 2019  Summary   Left ventricle grossly normal in size.   Normal LV segmental wall motion.   Normal left ventricular wall thickness.   Estimated left ventricular ejection fraction is 62±5%.   <50% criteria for diastolic dysfunction.   The LAESV Index is <34ml/m2.   Moderately dilated right ventricle.   Right ventricular segmental wall motion is normal.   Physiologic and/or trace mitral regurgitation is present.   Trace-mild aortic regurgitation is noted.   Physiologic and/or trace tricuspid regurgitation.  RVSP is 29 mmHg.   Physiologic and/or trace pulmonic regurgitation present.   Technically fair quality study. Radiology:  Xr Chest Standard (2 Vw)    Result Date: 2019  Patient MRN: [id number] : 1950 Age:  79 years Gender: Male Order Date: 2019 12:00 PM Exam: XR CHEST (2 VW) Number of Images: 2 views Indication:   chest pain chest pain shortness of breath Comparison: Prior chest radiograph from 2019 is available Findings: Examination of the chest in PA and lateral views demonstrate hyperaeration of lungs suggesting of chronic obstructive pulmonary disease. There is interstitial fibrosis seen in both lung bases. There is flattening of both hemidiaphragms.  There is no evidence of airspace

## 2019-05-31 NOTE — PROGRESS NOTES
Occupational Therapy  OCCUPATIONAL THERAPY INITIAL EVALUATION      Date:2019  Patient Name: Bailee Antoine  MRN: [id number]  : 1950  Room: [address] [place] [address]    Evaluating OT: Maritza Campbell OTR/L [id number]    AM-PAC Daily Activity Raw Score:     Recommended Adaptive Equipment: none      Diagnosis: pneumonia. Pt presents to ED with SOB and chills from home     Pertinent Medical History: emphysema, recent back surgery from [facility]  Precautions: falls, O2     Home Living: Pt lives with wife in a single story home with 2 steps HR on entry. Bathroom setup: walk in shower with seat. Use of O2 at night and PRN during the day at 3.5L     Prior Level of Function: Mod I with ADLs, Mod I with IADLs; completed functional mobility no AD. Has [employer]   Driving: Yes     Pain Level: no pain reported     Cognition: A&O: 4/4. Problem solving:  WFL              Judgement/safety:  WFL       Functional Assessment:   Initial Eval Status  Date: 19   Feeding Independent   Grooming Independent   UB Dressing Independent   LB Dressing Independent  Management of B socks   Bathing Independent   Toileting Independent   Bed Mobility  Supine <> sit: Independent   Functional Transfers Independent   Functional Mobility Independent no AD  Hallway distance   Balance Sitting: Good    Standing: fair plus no AD   Activity Tolerance Poor plus. Resting O2 on 5L: 93%. Mobility on 6L hallway distance: 82%. Moderate recovery time in sitting to achieve 92%. Strength ROM Additional Info:    RUE  WFL WFL good  and wfl FMC/dexterity noted during ADL tasks       LUE WFL WFL good  and wfl FMC/dexterity noted during ADL tasks         Hearing: WFL   Vision: WFL   Sensation:  No c/o numbness or tingling   Edema: none                             Pt educated on purpose of OT services with verbalized understanding. Pt reports no need for further OT interventions at this time.  OT evaluation only no indicated need for further

## 2019-06-01 PROBLEM — Z98.1 S/P LUMBAR FUSION: Status: ACTIVE | Noted: 2019-06-01

## 2019-06-01 LAB
ANION GAP SERPL CALCULATED.3IONS-SCNC: 12 MMOL/L (ref 7–16)
BASOPHILS ABSOLUTE: 0 E9/L (ref 0–0.2)
BASOPHILS RELATIVE PERCENT: 0 % (ref 0–2)
BUN BLDV-MCNC: 9 MG/DL (ref 8–23)
BURR CELLS: ABNORMAL
C-REACTIVE PROTEIN: 4.6 MG/DL (ref 0–0.4)
CALCIUM SERPL-MCNC: 9.2 MG/DL (ref 8.6–10.2)
CHLORIDE BLD-SCNC: 102 MMOL/L (ref 98–107)
CO2: 24 MMOL/L (ref 22–29)
CREAT SERPL-MCNC: 0.5 MG/DL (ref 0.7–1.2)
EOSINOPHILS ABSOLUTE: 0 E9/L (ref 0.05–0.5)
EOSINOPHILS RELATIVE PERCENT: 0 % (ref 0–6)
GFR AFRICAN AMERICAN: >60
GFR NON-AFRICAN AMERICAN: >60 ML/MIN/1.73
GLUCOSE BLD-MCNC: 170 MG/DL (ref 74–99)
GRAM STAIN RESULT: ABNORMAL
HCT VFR BLD CALC: 35.4 % (ref 37–54)
HEMOGLOBIN: 12.1 G/DL (ref 12.5–16.5)
IMMATURE GRANULOCYTES #: 0.09 E9/L
IMMATURE GRANULOCYTES %: 0.7 % (ref 0–5)
L. PNEUMOPHILA SEROGP 1 UR AG: NORMAL
LYMPHOCYTES ABSOLUTE: 0.33 E9/L (ref 1.5–4)
LYMPHOCYTES RELATIVE PERCENT: 2.6 % (ref 20–42)
MAGNESIUM: 1.9 MG/DL (ref 1.6–2.6)
MCH RBC QN AUTO: 30.3 PG (ref 26–35)
MCHC RBC AUTO-ENTMCNC: 34.2 % (ref 32–34.5)
MCV RBC AUTO: 88.7 FL (ref 80–99.9)
METER GLUCOSE: 110 MG/DL (ref 74–99)
METER GLUCOSE: 134 MG/DL (ref 74–99)
METER GLUCOSE: 151 MG/DL (ref 74–99)
MONOCYTES ABSOLUTE: 0.53 E9/L (ref 0.1–0.95)
MONOCYTES RELATIVE PERCENT: 4.2 % (ref 2–12)
NEUTROPHILS ABSOLUTE: 11.57 E9/L (ref 1.8–7.3)
NEUTROPHILS RELATIVE PERCENT: 92.5 % (ref 43–80)
ORGANISM: ABNORMAL
PDW BLD-RTO: 14.4 FL (ref 11.5–15)
PHOSPHORUS: 2.7 MG/DL (ref 2.5–4.5)
PLATELET # BLD: 241 E9/L (ref 130–450)
PMV BLD AUTO: 9.9 FL (ref 7–12)
POIKILOCYTES: ABNORMAL
POLYCHROMASIA: ABNORMAL
POTASSIUM SERPL-SCNC: 4 MMOL/L (ref 3.5–5)
RBC # BLD: 3.99 E12/L (ref 3.8–5.8)
SEDIMENTATION RATE, ERYTHROCYTE: 29 MM/HR (ref 0–15)
SODIUM BLD-SCNC: 138 MMOL/L (ref 132–146)
STREP PNEUMONIAE ANTIGEN, URINE: NORMAL
WBC # BLD: 12.5 E9/L (ref 4.5–11.5)
WOUND/ABSCESS: ABNORMAL
WOUND/ABSCESS: ABNORMAL

## 2019-06-01 PROCEDURE — 80048 BASIC METABOLIC PNL TOTAL CA: CPT

## 2019-06-01 PROCEDURE — 94640 AIRWAY INHALATION TREATMENT: CPT

## 2019-06-01 PROCEDURE — 6370000000 HC RX 637 (ALT 250 FOR IP): Performed by: INTERNAL MEDICINE

## 2019-06-01 PROCEDURE — 6360000002 HC RX W HCPCS: Performed by: INTERNAL MEDICINE

## 2019-06-01 PROCEDURE — 2580000003 HC RX 258: Performed by: INTERNAL MEDICINE

## 2019-06-01 PROCEDURE — 2700000000 HC OXYGEN THERAPY PER DAY

## 2019-06-01 PROCEDURE — 6360000002 HC RX W HCPCS: Performed by: SPECIALIST

## 2019-06-01 PROCEDURE — 85651 RBC SED RATE NONAUTOMATED: CPT

## 2019-06-01 PROCEDURE — 84100 ASSAY OF PHOSPHORUS: CPT

## 2019-06-01 PROCEDURE — 85025 COMPLETE CBC W/AUTO DIFF WBC: CPT

## 2019-06-01 PROCEDURE — 86140 C-REACTIVE PROTEIN: CPT

## 2019-06-01 PROCEDURE — 83735 ASSAY OF MAGNESIUM: CPT

## 2019-06-01 PROCEDURE — 2580000003 HC RX 258: Performed by: SPECIALIST

## 2019-06-01 PROCEDURE — 82962 GLUCOSE BLOOD TEST: CPT

## 2019-06-01 PROCEDURE — 2060000000 HC ICU INTERMEDIATE R&B

## 2019-06-01 PROCEDURE — 36415 COLL VENOUS BLD VENIPUNCTURE: CPT

## 2019-06-01 RX ORDER — METHYLPREDNISOLONE SODIUM SUCCINATE 40 MG/ML
40 INJECTION, POWDER, LYOPHILIZED, FOR SOLUTION INTRAMUSCULAR; INTRAVENOUS EVERY 12 HOURS
Status: DISCONTINUED | OUTPATIENT
Start: 2019-06-01 | End: 2019-06-03

## 2019-06-01 RX ADMIN — IBUPROFEN 400 MG: 400 TABLET, FILM COATED ORAL at 12:04

## 2019-06-01 RX ADMIN — IPRATROPIUM BROMIDE AND ALBUTEROL SULFATE 1 AMPULE: .5; 3 SOLUTION RESPIRATORY (INHALATION) at 12:43

## 2019-06-01 RX ADMIN — IBUPROFEN 400 MG: 400 TABLET, FILM COATED ORAL at 08:55

## 2019-06-01 RX ADMIN — ENOXAPARIN SODIUM 40 MG: 40 INJECTION SUBCUTANEOUS at 08:55

## 2019-06-01 RX ADMIN — IPRATROPIUM BROMIDE AND ALBUTEROL SULFATE 1 AMPULE: .5; 3 SOLUTION RESPIRATORY (INHALATION) at 20:13

## 2019-06-01 RX ADMIN — IBUPROFEN 400 MG: 400 TABLET, FILM COATED ORAL at 17:44

## 2019-06-01 RX ADMIN — FORMOTEROL FUMARATE DIHYDRATE 20 MCG: 20 SOLUTION RESPIRATORY (INHALATION) at 20:13

## 2019-06-01 RX ADMIN — SODIUM CHLORIDE: 9 INJECTION, SOLUTION INTRAVENOUS at 12:05

## 2019-06-01 RX ADMIN — METHYLPREDNISOLONE SODIUM SUCCINATE 40 MG: 40 INJECTION, POWDER, FOR SOLUTION INTRAMUSCULAR; INTRAVENOUS at 17:44

## 2019-06-01 RX ADMIN — BENZONATATE 100 MG: 100 CAPSULE ORAL at 14:20

## 2019-06-01 RX ADMIN — FORMOTEROL FUMARATE DIHYDRATE 20 MCG: 20 SOLUTION RESPIRATORY (INHALATION) at 08:33

## 2019-06-01 RX ADMIN — IPRATROPIUM BROMIDE AND ALBUTEROL SULFATE 1 AMPULE: .5; 3 SOLUTION RESPIRATORY (INHALATION) at 16:04

## 2019-06-01 RX ADMIN — BUDESONIDE 1000 MCG: 0.25 SUSPENSION RESPIRATORY (INHALATION) at 20:13

## 2019-06-01 RX ADMIN — BENZONATATE 100 MG: 100 CAPSULE ORAL at 08:55

## 2019-06-01 RX ADMIN — VANCOMYCIN HYDROCHLORIDE 2000 MG: 10 INJECTION, POWDER, LYOPHILIZED, FOR SOLUTION INTRAVENOUS at 17:45

## 2019-06-01 RX ADMIN — PANTOPRAZOLE SODIUM 40 MG: 40 TABLET, DELAYED RELEASE ORAL at 06:28

## 2019-06-01 RX ADMIN — CEFEPIME 2 G: 2 INJECTION, POWDER, FOR SOLUTION INTRAVENOUS at 12:04

## 2019-06-01 RX ADMIN — CEFEPIME 2 G: 2 INJECTION, POWDER, FOR SOLUTION INTRAVENOUS at 02:44

## 2019-06-01 RX ADMIN — BENZONATATE 100 MG: 100 CAPSULE ORAL at 21:10

## 2019-06-01 RX ADMIN — IPRATROPIUM BROMIDE AND ALBUTEROL SULFATE 1 AMPULE: .5; 3 SOLUTION RESPIRATORY (INHALATION) at 08:33

## 2019-06-01 RX ADMIN — METHYLPREDNISOLONE SODIUM SUCCINATE 40 MG: 40 INJECTION, POWDER, FOR SOLUTION INTRAMUSCULAR; INTRAVENOUS at 06:28

## 2019-06-01 RX ADMIN — BUDESONIDE 1000 MCG: 0.25 SUSPENSION RESPIRATORY (INHALATION) at 08:33

## 2019-06-01 RX ADMIN — CEFEPIME 2 G: 2 INJECTION, POWDER, FOR SOLUTION INTRAVENOUS at 23:44

## 2019-06-01 ASSESSMENT — PAIN SCALES - GENERAL
PAINLEVEL_OUTOF10: 0

## 2019-06-01 NOTE — PROGRESS NOTES
benzonatate  100 mg Oral TID    formoterol  20 mcg Nebulization BID    enoxaparin  40 mg Subcutaneous Daily    ipratropium-albuterol  1 ampule Inhalation Q4H WA    pantoprazole  40 mg Oral QAM AC       VITAL SIGNS:                                                                                                                          /61   Pulse 86   Temp 98 °F (36.7 °C) (Oral)   Resp 18   Ht 6' 1\" (1.854 m)   Wt 210 lb 12.8 oz (95.6 kg)   SpO2 92%   BMI 27.81 kg/m²   Patient Vitals for the past 96 hrs (Last 3 readings):   Weight   06/01/19 0601 210 lb 12.8 oz (95.6 kg)   05/30/19 1107 205 lb (93 kg)     OBJECTIVE:    HEENT: PERRL, EOM  Intact; sclera non-icteric, conjunctiva pink. Carotids are brisk in upstroke with normal contour. No carotid bruits. Normal jugular venous pulsation at 45°. No palpable cervical nor supraclavicular nodes. Thyroid not palpable. Trachea midline. Chest: Even excursion  Lungs: CTA B, no expiratory wheezes or rhonchi, no decreased tactile fremitus without inspiratory rales. Heart: Regular  rhythm; S1 > S2, no gallop or murmur. No clicks, rub, palpable thrills   or heaves. PMI nondisplaced, 5th intercostal space MCL. Abdomen: Soft, nontender, nondistended,  mildly protuberant, no masses or organomegaly. Bowel sounds active. Extremities: Without clubbing, cyanosis or edema. Pulses present 3+ upper extermities bilaterally; present 2+ DP and present 1+ PT bilaterally.      Data:   Scheduled Meds: Reviewed  Continuous Infusions:    dextrose      sodium chloride 100 mL/hr at 05/30/19 2251       Intake/Output Summary (Last 24 hours) at 6/1/2019 1032  Last data filed at 6/1/2019 0630  Gross per 24 hour   Intake 4200 ml   Output 1100 ml   Net 3100 ml     CBC:   Recent Labs     05/30/19  1138 05/31/19  0900 06/01/19  0246   WBC 10.7 8.5 12.5*   HGB 14.1 14.5 12.1*   HCT 41.1 43.6 35.4*    261 241     BMP:  Recent Labs     05/30/19  1138 05/31/19  0900 06/01/19  0246 Order Date:2019 12:30 PM EXAM:  CT CHEST WO CONTRAST NUMBER OF IMAGES:  334 INDICATION:  Left upper lobe pneumonia COMPARISON: Anterior upright chest yesterday 1129 hours and two-view upright chest May 28, 2019, showing patchy density in the left mid lung on the more recent study TECHNIQUE:  Axial images were obtained from the apices of the lungs through the upper abdomen without contrast administration. Sagittal and coronal reconstructions performed for aid in interpretation of the study. Low-dose CT  acquisition technique included one of following options; 1 . Automated exposure control, 2. Adjustment of MA and or KV according to patient's size or 3. Use of iterative reconstruction. FINDINGS: Lung window images: There is severe subpleural and centrilobular emphysematous changes throughout the lungs, with a superimposed consolidative pneumonia in the left upper lung medially, along the anterior chest wall at about the level of the gualberto. There is no dependent effusion. There is some mild interstitial fibrosis or scarring in both lower lungs. Soft tissue window images:  Soft tissue window images show no evidence of thoracic inlet or axillary adenopathy, but there is prominent AP window, pretracheal/retrocaval, and left periaortic adenopathy. Coronary artery calcification is present, but there is no evidence of cardiac enlargement or decompensation. There is elevation of the lateral right hemidiaphragm. Upper abdomen: Noncontrast imaging shows limited detail, but no visceral abnormalities are noted to the level of the mid kidneys Skeletal: Unremarkable     Severe emphysematous change of the lungs with a consolidative left upper lobe pneumonia along the anterior mediastinal border. There is no associated dependent effusion, but there is mediastinal adenopathy.      Mri Lumbar Spine W Wo Contrast    Result Date: 2019  Patient MRN:  [id number] : 1950 Age: 79 years Gender: Male Order Date:  2019 5:00 PM TECHNIQUE/NUMBER OF IMAGES/COMPARISON/CLINICAL HISTORY: MRI lumbar spine T1-T2 STIR sequences were obtained T1 sequence done without and with IV contrast. Clinical history after fusion, for evaluation of epidural abscess. Images 213 IV contrast: 19 mL of ProHance. FINDINGS: Millicuries of level determination is based on counting of the CT thoracic spine of November 14, 2018 which is demonstrated 12 there is a redundant thoracic vertebrae, 5 no memory lumbar vertebrae and a transitional level in L5-S1 more likely partially sacralization of the first sacral spine segment with a rudimentary size disc between the transitional level and the united sacral spine, with the bilateral suleiman apophysis partially united to the sacral wings. Based on these level determination, patient had a recent the laminectomy and posterior fusion of the lumbar spine at the levels of L4 and L5, with interpedicular screws. The postcontrast images were not done with fat suppression but there is no conspicuous indication for an abnormal enhancements in the postcontrast study in the epidural spaces more than the enhancement in epidural veins. There is a fluid accumulation in the site of the previous laminectomy measuring about 3.2 x 2.5 x 2.2 cm which cause some IMPRESSION the posterior aspect of the thecal sac but the canal is decompressed by the laminectomy. There is another fluid accumulation in the midline retrovertebral region within the subcutaneous fat, superficial to the posterior muscular fascia measuring 5.6 x 1.3 x 2.3 cm. Cannot see conspicuously postcontrast enhancement. This can represent a post operatory seroma/hematoma but the present study cannot be determined if they are infected or not infected. This requires correlation with clinical data.  There are some areas of subchondral edema in the inferior endplate of L4 and superior endplate of L5 with some increased signal in the intervertebral disc but they cannot see conspicuous postcontrast enhancement. This more likely on degenerative changes from degenerative disc process. There are unremarkable appearance for the contents of the thecal sac. No abnormal intradural enhancements are seen. There is normal appearance for the distal thoracic spinal cord, conus medullaris and nerve roots. The canal is decompressed where laminectomy has been performed in between the pedicles of L4 and L5. At L5-transitional level there is some degree of spine canal stenosis in mild-to-moderate degree. This is below the level of the laminectomy. 1. Transitional vessel in the lumbar sacral junction, see above comments regarding the level determination. 2. Previous laminectomy L4 and L5 level with posterior fusion with interpedicular screws. 3. Post operatory changes with well delineated hematoma/seroma formation, one in the site of the laminectomy and the other in posterior retrovertebral midline subcutaneous fat. No conspicuous abnormal postcontrast enhancement identified. However can not determined by imaging if they are not or if they are infected. Clinical correlation needed. 4. No demonstration of an epidural abscess . 5. Subchondral edema without specific enhancement in the intervertebral disc at L4-5 more likely secondary to degenerative disc disease. Xr Chest Portable    Result Date: 2019  Patient MRN:  [id number] : 1950 Age: 79 years Gender: Male Order Date:  2019 11:30 AM EXAM: XR CHEST PORTABLE INDICATION:  COPD COPD COMPARISON: 2019 FINDINGS:  There is significant hyperinflation of the lungs and significant generalized parenchymal fibrosis. There is pleural thickening in the right costophrenic angle. There is a new infiltrate in the left mid hemithorax. Significant COPD and bilateral parenchymal fibrosis as well as pleural thickening in the right costophrenic angle. This is unchanged.  New infiltrate suggested in left mid hemithorax     Xr Eye Foreign Body    Result Date: 2019  Patient MRN: [id number] : 1950 Age:  79 years Gender: Male Order Date: 2019 2:30 PM Exam: XR EYE FOREIGN BODY Number of Images: 2 views Indication: Foreign body Comparison: None. Findings: No radiopaque foreign body is identified    No radiopaque foreign body is identified      EKG: See Report  Echo: 2019  Summary   Left ventricle grossly normal in size.   Mild left ventricular concentric hypertrophy noted.   Normal LV segmental wall motion.   Estimated left ventricular ejection fraction is 60±5%.   <50% criteria for diastolic dysfunction.   The LAESV Index is <34ml/m2.   Moderately dilated right ventricle.   Right ventricle global systolic function is normal .   Physiologic and/or trace mitral regurgitation is present.   Trace aortic regurgitation is noted.   Physiologic and/or trace tricuspid regurgitation.  RVSP is 40 mmHg.   Technically good quality study.   Compared to prior echo, no significant changes noted.   Suggest clinical correlation. IMPRESSIONS:  Principal Problem:    Pneumonia, bacterial  Active Problems:    Left lower lobe pneumonia (HCC)    COPD (chronic obstructive pulmonary disease) (HCC)    Acute and chronic respiratory failure with hypoxia (HCC)    Bullous emphysema (HCC)    Pulmonary emphysema with fibrosis of lung ([address]    Thoracic ascending aortic aneurysm (HCC)  Resolved Problems:    * No resolved hospital problems. *      RECOMMENDATIONS:  Based upon Mr. Joel Lindo clinical status as well as his echocardiographic findings appear that his left ventricular function is quite normal however his right ventricle is mildly dilated. His ventricular ectopy could be result of underlying COPD but also could be related to underlying occult coronary artery disease for which he presently is asymptomatic despite his CT scan suggesting coronary artery calcifications within the LAD and circumflex arteries. .  Thus I have reviewed the typical symptoms associated with coronary artery disease and have advised him to follow-up immediately with Dr. Crook Sandborn his primary internist, should he note any symptoms as I have reviewed with him  Additionally he should maintain his LDL cholesterol within updated 2018 ACC/AHA/AACE cholesterol guidelines. As for his thoracic aortic aneurysmal dilatation of 3.8 cm, certainly this is minimal dilatation presently and would recheck in 24 months.,    I have spent more than 25 minutes face to face with Jose Echevarria and reviewing notes and laboratory data, with greater than 50% of this time instructing and counseling the patient face to face regarding my findings and recommendations and I have answered all questions as posed to me by  Michael Taylor. Maud Lundborg, DO FACP,FACC,FSCAI      NOTE:  This report was transcribed using voice recognition software.   Every effort was made to ensure accuracy; however, inadvertent computerized transcription errors may be present

## 2019-06-01 NOTE — PROGRESS NOTES
use of accessory muscles to breathe. Symmetrical expansion. Auscultation reveals no wheezing, crackles, or rhonchi. On O2 via nc. Cardiovascular: S1 and S2 are rhythmic and regular. No murmurs appreciated. Abdomen: Positive bowel sounds to auscultation. Benign to palpation. Extremities: No clubbing, no cyanosis, no edema. Lines: peripheral    Laboratory and Tests Review:  Lab Results   Component Value Date    WBC 12.5 (H) 06/01/2019    WBC 8.5 05/31/2019    WBC 10.7 05/30/2019    HGB 12.1 (L) 06/01/2019    HCT 35.4 (L) 06/01/2019    MCV 88.7 06/01/2019     06/01/2019     Lab Results   Component Value Date    NEUTROABS 11.57 (H) 06/01/2019    NEUTROABS 8.11 (H) 05/31/2019    NEUTROABS 8.13 (H) 05/30/2019     Lab Results   Component Value Date    CRP 12.2 (H) 05/30/2019    CRP 7.4 (H) 04/22/2019     No results found for: CRPHS  Lab Results   Component Value Date    SEDRATE 29 (H) 06/01/2019    SEDRATE 42 (H) 05/30/2019    SEDRATE 21 (H) 04/22/2019     Lab Results   Component Value Date    ALT 7 05/30/2019    AST 12 05/30/2019    ALKPHOS 73 05/30/2019    BILITOT 0.9 05/30/2019     Lab Results   Component Value Date     06/01/2019    K 4.0 06/01/2019    K 4.4 04/22/2019     06/01/2019    CO2 24 06/01/2019    BUN 9 06/01/2019    CREATININE 0.5 06/01/2019    CREATININE 0.5 05/31/2019    CREATININE 0.7 05/30/2019    GFRAA >60 06/01/2019    LABGLOM >60 06/01/2019    GLUCOSE 170 06/01/2019    PROT 6.5 05/30/2019    LABALBU 3.6 05/30/2019    CALCIUM 9.2 06/01/2019    BILITOT 0.9 05/30/2019    ALKPHOS 73 05/30/2019    AST 12 05/30/2019    ALT 7 05/30/2019     Radiology:      Microbiology:   Lumbar wound culture MSSA  Sputum cx pending, Gram stain <25 pmns, gpc, gpr, gnr  Resp viral panel neg  MRSA screen pending  Urine legionella pending    ASSESSMENT:  · Left upper lobe healthcare associated pneumonia. Previously admitted in April 2019. Treated and discharged with Levofloxacin.  The patient does have

## 2019-06-01 NOTE — PROGRESS NOTES
P Quality Flow/Interdisciplinary Rounds Progress Note        Quality Flow Rounds held on June 1, 2019    Disciplines Attending:  Bedside Nurse, ,  and Nursing Unit Leadership    Thelma Lewis was admitted on 5/30/2019 11:02 AM    Anticipated Discharge Date:  Expected Discharge Date: 06/02/19    Disposition:    Sandro Score:  Sandro Scale Score: 22    Readmission Risk              Risk of Unplanned Readmission:        12           Discussed patient goal for the day, patient clinical progression, and barriers to discharge.   The following Goal(s) of the Day/Commitment(s) have been identified:  await cultures/IV abx      Germaine Dong  June 1, 2019

## 2019-06-02 LAB
ANION GAP SERPL CALCULATED.3IONS-SCNC: 9 MMOL/L (ref 7–16)
BASOPHILS ABSOLUTE: 0.01 E9/L (ref 0–0.2)
BASOPHILS RELATIVE PERCENT: 0.1 % (ref 0–2)
BUN BLDV-MCNC: 9 MG/DL (ref 8–23)
CALCIUM SERPL-MCNC: 9.8 MG/DL (ref 8.6–10.2)
CHLORIDE BLD-SCNC: 103 MMOL/L (ref 98–107)
CO2: 26 MMOL/L (ref 22–29)
CREAT SERPL-MCNC: 0.6 MG/DL (ref 0.7–1.2)
CULTURE, RESPIRATORY: NORMAL
EOSINOPHILS ABSOLUTE: 0 E9/L (ref 0.05–0.5)
EOSINOPHILS RELATIVE PERCENT: 0 % (ref 0–6)
GFR AFRICAN AMERICAN: >60
GFR NON-AFRICAN AMERICAN: >60 ML/MIN/1.73
GLUCOSE BLD-MCNC: 146 MG/DL (ref 74–99)
HCT VFR BLD CALC: 37.4 % (ref 37–54)
HEMOGLOBIN: 12.5 G/DL (ref 12.5–16.5)
IMMATURE GRANULOCYTES #: 0.08 E9/L
IMMATURE GRANULOCYTES %: 0.9 % (ref 0–5)
LYMPHOCYTES ABSOLUTE: 0.67 E9/L (ref 1.5–4)
LYMPHOCYTES RELATIVE PERCENT: 7.4 % (ref 20–42)
MAGNESIUM: 1.9 MG/DL (ref 1.6–2.6)
MCH RBC QN AUTO: 30.3 PG (ref 26–35)
MCHC RBC AUTO-ENTMCNC: 33.4 % (ref 32–34.5)
MCV RBC AUTO: 90.6 FL (ref 80–99.9)
METER GLUCOSE: 100 MG/DL (ref 74–99)
METER GLUCOSE: 104 MG/DL (ref 74–99)
METER GLUCOSE: 118 MG/DL (ref 74–99)
METER GLUCOSE: 186 MG/DL (ref 74–99)
MONOCYTES ABSOLUTE: 0.37 E9/L (ref 0.1–0.95)
MONOCYTES RELATIVE PERCENT: 4.1 % (ref 2–12)
MRSA CULTURE ONLY: NORMAL
NEUTROPHILS ABSOLUTE: 7.97 E9/L (ref 1.8–7.3)
NEUTROPHILS RELATIVE PERCENT: 87.5 % (ref 43–80)
PDW BLD-RTO: 14.8 FL (ref 11.5–15)
PHOSPHORUS: 2.6 MG/DL (ref 2.5–4.5)
PLATELET # BLD: 264 E9/L (ref 130–450)
PMV BLD AUTO: 9.5 FL (ref 7–12)
POTASSIUM SERPL-SCNC: 4.1 MMOL/L (ref 3.5–5)
RBC # BLD: 4.13 E12/L (ref 3.8–5.8)
SMEAR, RESPIRATORY: NORMAL
SODIUM BLD-SCNC: 138 MMOL/L (ref 132–146)
WBC # BLD: 9.1 E9/L (ref 4.5–11.5)

## 2019-06-02 PROCEDURE — 84100 ASSAY OF PHOSPHORUS: CPT

## 2019-06-02 PROCEDURE — 2060000000 HC ICU INTERMEDIATE R&B

## 2019-06-02 PROCEDURE — 85025 COMPLETE CBC W/AUTO DIFF WBC: CPT

## 2019-06-02 PROCEDURE — 6360000002 HC RX W HCPCS: Performed by: INTERNAL MEDICINE

## 2019-06-02 PROCEDURE — 6370000000 HC RX 637 (ALT 250 FOR IP): Performed by: INTERNAL MEDICINE

## 2019-06-02 PROCEDURE — 83735 ASSAY OF MAGNESIUM: CPT

## 2019-06-02 PROCEDURE — 82962 GLUCOSE BLOOD TEST: CPT

## 2019-06-02 PROCEDURE — 80048 BASIC METABOLIC PNL TOTAL CA: CPT

## 2019-06-02 PROCEDURE — 6370000000 HC RX 637 (ALT 250 FOR IP): Performed by: NURSE PRACTITIONER

## 2019-06-02 PROCEDURE — 94640 AIRWAY INHALATION TREATMENT: CPT

## 2019-06-02 PROCEDURE — 2580000003 HC RX 258: Performed by: INTERNAL MEDICINE

## 2019-06-02 PROCEDURE — 2700000000 HC OXYGEN THERAPY PER DAY

## 2019-06-02 PROCEDURE — 36415 COLL VENOUS BLD VENIPUNCTURE: CPT

## 2019-06-02 RX ADMIN — PANTOPRAZOLE SODIUM 40 MG: 40 TABLET, DELAYED RELEASE ORAL at 06:15

## 2019-06-02 RX ADMIN — METHYLPREDNISOLONE SODIUM SUCCINATE 40 MG: 40 INJECTION, POWDER, FOR SOLUTION INTRAMUSCULAR; INTRAVENOUS at 06:14

## 2019-06-02 RX ADMIN — IBUPROFEN 400 MG: 400 TABLET, FILM COATED ORAL at 09:47

## 2019-06-02 RX ADMIN — COLLAGENASE SANTYL: 250 OINTMENT TOPICAL at 09:48

## 2019-06-02 RX ADMIN — IPRATROPIUM BROMIDE AND ALBUTEROL SULFATE 1 AMPULE: .5; 3 SOLUTION RESPIRATORY (INHALATION) at 16:06

## 2019-06-02 RX ADMIN — IBUPROFEN 400 MG: 400 TABLET, FILM COATED ORAL at 13:07

## 2019-06-02 RX ADMIN — METHYLPREDNISOLONE SODIUM SUCCINATE 40 MG: 40 INJECTION, POWDER, FOR SOLUTION INTRAMUSCULAR; INTRAVENOUS at 17:31

## 2019-06-02 RX ADMIN — IPRATROPIUM BROMIDE AND ALBUTEROL SULFATE 1 AMPULE: .5; 3 SOLUTION RESPIRATORY (INHALATION) at 20:14

## 2019-06-02 RX ADMIN — BENZONATATE 100 MG: 100 CAPSULE ORAL at 13:07

## 2019-06-02 RX ADMIN — CEFEPIME 2 G: 2 INJECTION, POWDER, FOR SOLUTION INTRAVENOUS at 13:08

## 2019-06-02 RX ADMIN — BENZONATATE 100 MG: 100 CAPSULE ORAL at 19:55

## 2019-06-02 RX ADMIN — BENZONATATE 100 MG: 100 CAPSULE ORAL at 09:46

## 2019-06-02 RX ADMIN — FORMOTEROL FUMARATE DIHYDRATE 20 MCG: 20 SOLUTION RESPIRATORY (INHALATION) at 20:14

## 2019-06-02 RX ADMIN — INSULIN LISPRO 1 UNITS: 100 INJECTION, SOLUTION INTRAVENOUS; SUBCUTANEOUS at 12:02

## 2019-06-02 RX ADMIN — IPRATROPIUM BROMIDE AND ALBUTEROL SULFATE 1 AMPULE: .5; 3 SOLUTION RESPIRATORY (INHALATION) at 12:21

## 2019-06-02 RX ADMIN — BUDESONIDE 1000 MCG: 0.25 SUSPENSION RESPIRATORY (INHALATION) at 20:14

## 2019-06-02 RX ADMIN — LEVOFLOXACIN 750 MG: 500 TABLET, FILM COATED ORAL at 17:30

## 2019-06-02 RX ADMIN — IPRATROPIUM BROMIDE AND ALBUTEROL SULFATE 1 AMPULE: .5; 3 SOLUTION RESPIRATORY (INHALATION) at 09:01

## 2019-06-02 RX ADMIN — BUDESONIDE 1000 MCG: 0.25 SUSPENSION RESPIRATORY (INHALATION) at 09:01

## 2019-06-02 RX ADMIN — IBUPROFEN 400 MG: 400 TABLET, FILM COATED ORAL at 17:31

## 2019-06-02 RX ADMIN — FORMOTEROL FUMARATE DIHYDRATE 20 MCG: 20 SOLUTION RESPIRATORY (INHALATION) at 09:01

## 2019-06-02 RX ADMIN — ENOXAPARIN SODIUM 40 MG: 40 INJECTION SUBCUTANEOUS at 09:47

## 2019-06-02 ASSESSMENT — PAIN SCALES - GENERAL
PAINLEVEL_OUTOF10: 3
PAINLEVEL_OUTOF10: 3
PAINLEVEL_OUTOF10: 0
PAINLEVEL_OUTOF10: 3

## 2019-06-02 NOTE — PROGRESS NOTES
Pulmonary Progress Note    Admit Date: 2019  Hospital day                               PCP: Chikis Sykes MD    Chief Complaint (s):  Patient Active Problem List   Diagnosis    Left upper lobe pneumonia ([address]    COPD (chronic obstructive pulmonary disease) ([address]    Acute and chronic respiratory failure with hypoxia ([address]    Closed displaced fracture of second metatarsal bone of right foot with routine healing    COPD exacerbation ([address]    Acute respiratory failure with hypoxia ([address]    Bullous emphysema ([address]    Right lower lobe pneumonia ([address]    Pulmonary emphysema with fibrosis of lung ([address]    Thoracic ascending aortic aneurysm ([address]    Pneumonia    Cough with hemoptysis    Pneumonia, bacterial    S/P lumbar fusion       Subjective:  · Oxygen requirements continue to be high with 6 L needed for any exertion. Vitals:  VITALS:  /74   Pulse 68   Temp 97.9 °F (36.6 °C) (Oral)   Resp 18   Ht 6' 1\" (1.854 m)   Wt 213 lb (96.6 kg)   SpO2 93%   BMI 28.10 kg/m²     24HR INTAKE/OUTPUT:      Intake/Output Summary (Last 24 hours) at 2019 1154  Last data filed at 2019 0445  Gross per 24 hour   Intake 799 ml   Output 725 ml   Net 74 ml       24HR PULSE OXIMETRY RANGE:    SpO2  Av.7 %  Min: 93 %  Max: 94 %    Medications:  IV:   dextrose      sodium chloride 100 mL/hr at 19 1205       Scheduled Meds:   methylPREDNISolone  40 mg Intravenous Q12H    budesonide  1,000 mcg Nebulization BID    cefepime  2 g Intravenous Q12H    insulin lispro  0-6 Units Subcutaneous TID WC    insulin lispro  0-3 Units Subcutaneous Nightly    collagenase   Topical Daily    ibuprofen  400 mg Oral TID WC    benzonatate  100 mg Oral TID    formoterol  20 mcg Nebulization BID    enoxaparin  40 mg Subcutaneous Daily    ipratropium-albuterol  1 ampule Inhalation Q4H WA    pantoprazole  40 mg Oral QAM AC       Diet:   DIET NO SALT ADDED (3-4 GM);      EXAM:  General: No distress. Alert. Eyes: PERRL. No sclera icterus. No conjunctival injection. ENT: No discharge. Pharynx clear. Neck: Trachea midline. Normal thyroid. Resp: No accessory muscle use. No rales. No wheezing. No rhonchi. CV: Regular rate. Regular rhythm. No murmur or rub. Abd: Non-tender. Non-distended. No masses. No organomegaly. Normal bowel sounds. Skin: Warm and dry. No nodule on exposed extremities. No rash on exposed extremities. Ext: No cyanosis, clubbing, edema  Lymph: No cervical LAD. No supraclavicular LAD. M/S: No cyanosis. No joint deformity. No clubbing. Neuro: Awake. Follows commands. Positive pupils/gag/corneals. Normal pain response. Results:  CBC:   Recent Labs     05/31/19  0900 06/01/19  0246 06/02/19  0357   WBC 8.5 12.5* 9.1   HGB 14.5 12.1* 12.5   HCT 43.6 35.4* 37.4   MCV 89.7 88.7 90.6    241 264     BMP:   Recent Labs     05/31/19  0900 06/01/19  0246 06/02/19  0357    138 138   K 3.7 4.0 4.1   CL 98 102 103   CO2 21* 24 26   PHOS 2.2* 2.7 2.6   BUN 10 9 9   CREATININE 0.5* 0.5* 0.6*     LIVER PROFILE:   No results for input(s): AST, ALT, LIPASE, BILIDIR, BILITOT, ALKPHOS in the last 72 hours. Invalid input(s): AMYLASE,  ALB  PT/INR:   No results for input(s): PROTIME, INR in the last 72 hours. APTT:   No results for input(s): APTT in the last 72 hours. Pathology:  1. N/A      Microbiology:  1. None    Recent ABG:   No results for input(s): PH, PO2, PCO2, HCO3, BE, O2SAT, METHB, O2HB, COHB, O2CON, HHB, THB in the last 72 hours. Recent Films:  CT Chest WO Contrast   Final Result   Severe emphysematous change of the lungs with a consolidative left   upper lobe pneumonia along the anterior mediastinal border. There is   no associated dependent effusion, but there is mediastinal adenopathy. MRI LUMBAR SPINE W WO CONTRAST   Final Result   1.  Transitional vessel in the lumbar sacral junction, see above   comments regarding the level

## 2019-06-02 NOTE — PROGRESS NOTES
[address] [place] [address] [facility]  Progress Note    SUBJECTIVE:  Chief Complaint   Patient presents with    Shortness of Breath     Started this morning. 71% on RA    Fever     Started last night with chills, this morning was 101. Had Back surgery .      Patient is tolerating medications. No reported adverse drug reactions. Ambulating in room. No nausea, vomiting, diarrhea. Feeling better. + cough, very little sputum. Breathing better. On 4L/min O2 via NC. No pain    Review of systems:  As stated above in the chief complaint, otherwise negative. Medications:  Scheduled Meds:   methylPREDNISolone  40 mg Intravenous Q12H    budesonide  1,000 mcg Nebulization BID    cefepime  2 g Intravenous Q12H    insulin lispro  0-6 Units Subcutaneous TID WC    insulin lispro  0-3 Units Subcutaneous Nightly    vancomycin  2,000 mg Intravenous Q24H    collagenase   Topical Daily    ibuprofen  400 mg Oral TID WC    benzonatate  100 mg Oral TID    formoterol  20 mcg Nebulization BID    enoxaparin  40 mg Subcutaneous Daily    ipratropium-albuterol  1 ampule Inhalation Q4H WA    pantoprazole  40 mg Oral QAM AC     Continuous Infusions:   dextrose      sodium chloride 100 mL/hr at 19 1205     PRN Meds:glucose, dextrose, glucagon (rDNA), dextrose, ondansetron, acetaminophen    OBJECTIVE:  /74   Pulse 68   Temp 97.9 °F (36.6 °C) (Oral)   Resp 18   Ht 6' 1\" (1.854 m)   Wt 213 lb (96.6 kg)   SpO2 93%   BMI 28.10 kg/m²   Temp  Av °F (36.7 °C)  Min: 97.9 °F (36.6 °C)  Max: 98 °F (36.7 °C)  Constitutional: The patient is awake, alert, and oriented. Skin: Warm and dry. No rashes were noted. Lumbar incision with dehiscence, wound fibrotic, no odor or cellulitis, no active d/c. HEENT: Eyes show round, and reactive pupils. No jaundice. Moist mucous membranes, no ulcerations, no thrush. Neck: Supple to movements. No lymphadenopathy.    Chest: No use of accessory muscles to

## 2019-06-02 NOTE — PROGRESS NOTES
seen. There is normal  appearance for the distal thoracic spinal cord, conus medullaris and  nerve roots. The canal is decompressed where laminectomy has been performed in  between the pedicles of L4 and L5. At L5-transitional level there is  some degree of spine canal stenosis in mild-to-moderate degree. This  is below the level of the laminectomy.      Impression:       1. Transitional vessel in the lumbar sacral junction, see above  comments regarding the level determination. 2. Previous laminectomy L4 and L5 level with posterior fusion with  interpedicular screws. 3. Post operatory changes with well delineated hematoma/seroma  formation, one in the site of the laminectomy and the other in  posterior retrovertebral midline subcutaneous fat. No conspicuous  abnormal postcontrast enhancement identified. However can not  determined by imaging if they are not or if they are infected. Clinical correlation needed. 4. No demonstration of an epidural abscess . 5. Subchondral edema without specific enhancement in the  intervertebral disc at L4-5 more likely secondary to degenerative disc  disease. CT of the chest completed with the following results--    FINDINGS:  Lung window images: There is severe subpleural and centrilobular  emphysematous changes throughout the lungs, with a superimposed  consolidative pneumonia in the left upper lung medially, along the  anterior chest wall at about the level of the gualberto. There is no  dependent effusion. There is some mild interstitial fibrosis or  scarring in both lower lungs. Soft tissue window images:  Soft tissue window images show no evidence  of thoracic inlet or axillary adenopathy, but there is prominent AP  window, pretracheal/retrocaval, and left periaortic adenopathy. Coronary artery calcification is present, but there is no evidence of  cardiac enlargement or decompensation. There is elevation of the  lateral right hemidiaphragm.     Upper

## 2019-06-03 VITALS
RESPIRATION RATE: 18 BRPM | SYSTOLIC BLOOD PRESSURE: 136 MMHG | HEART RATE: 74 BPM | HEIGHT: 73 IN | WEIGHT: 213 LBS | BODY MASS INDEX: 28.23 KG/M2 | OXYGEN SATURATION: 95 % | TEMPERATURE: 98.8 F | DIASTOLIC BLOOD PRESSURE: 74 MMHG

## 2019-06-03 LAB
ANION GAP SERPL CALCULATED.3IONS-SCNC: 10 MMOL/L (ref 7–16)
BASOPHILS ABSOLUTE: 0.02 E9/L (ref 0–0.2)
BASOPHILS RELATIVE PERCENT: 0.2 % (ref 0–2)
BUN BLDV-MCNC: 10 MG/DL (ref 8–23)
CALCIUM SERPL-MCNC: 9.2 MG/DL (ref 8.6–10.2)
CHLORIDE BLD-SCNC: 101 MMOL/L (ref 98–107)
CO2: 27 MMOL/L (ref 22–29)
CREAT SERPL-MCNC: 0.6 MG/DL (ref 0.7–1.2)
EOSINOPHILS ABSOLUTE: 0 E9/L (ref 0.05–0.5)
EOSINOPHILS RELATIVE PERCENT: 0 % (ref 0–6)
GFR AFRICAN AMERICAN: >60
GFR NON-AFRICAN AMERICAN: >60 ML/MIN/1.73
GLUCOSE BLD-MCNC: 93 MG/DL (ref 74–99)
HCT VFR BLD CALC: 37.5 % (ref 37–54)
HEMOGLOBIN: 12.3 G/DL (ref 12.5–16.5)
IMMATURE GRANULOCYTES #: 0.17 E9/L
IMMATURE GRANULOCYTES %: 2.1 % (ref 0–5)
LYMPHOCYTES ABSOLUTE: 1.09 E9/L (ref 1.5–4)
LYMPHOCYTES RELATIVE PERCENT: 13.6 % (ref 20–42)
MAGNESIUM: 1.9 MG/DL (ref 1.6–2.6)
MCH RBC QN AUTO: 29.6 PG (ref 26–35)
MCHC RBC AUTO-ENTMCNC: 32.8 % (ref 32–34.5)
MCV RBC AUTO: 90.4 FL (ref 80–99.9)
METER GLUCOSE: 118 MG/DL (ref 74–99)
METER GLUCOSE: 92 MG/DL (ref 74–99)
MONOCYTES ABSOLUTE: 0.6 E9/L (ref 0.1–0.95)
MONOCYTES RELATIVE PERCENT: 7.5 % (ref 2–12)
NEUTROPHILS ABSOLUTE: 6.15 E9/L (ref 1.8–7.3)
NEUTROPHILS RELATIVE PERCENT: 76.6 % (ref 43–80)
PDW BLD-RTO: 14.8 FL (ref 11.5–15)
PHOSPHORUS: 2.9 MG/DL (ref 2.5–4.5)
PLATELET # BLD: 256 E9/L (ref 130–450)
PMV BLD AUTO: 9.7 FL (ref 7–12)
POTASSIUM SERPL-SCNC: 4.1 MMOL/L (ref 3.5–5)
RBC # BLD: 4.15 E12/L (ref 3.8–5.8)
SODIUM BLD-SCNC: 138 MMOL/L (ref 132–146)
WBC # BLD: 8 E9/L (ref 4.5–11.5)

## 2019-06-03 PROCEDURE — 84100 ASSAY OF PHOSPHORUS: CPT

## 2019-06-03 PROCEDURE — 6370000000 HC RX 637 (ALT 250 FOR IP): Performed by: NURSE PRACTITIONER

## 2019-06-03 PROCEDURE — 82962 GLUCOSE BLOOD TEST: CPT

## 2019-06-03 PROCEDURE — 6370000000 HC RX 637 (ALT 250 FOR IP): Performed by: INTERNAL MEDICINE

## 2019-06-03 PROCEDURE — 83735 ASSAY OF MAGNESIUM: CPT

## 2019-06-03 PROCEDURE — 36415 COLL VENOUS BLD VENIPUNCTURE: CPT

## 2019-06-03 PROCEDURE — 2700000000 HC OXYGEN THERAPY PER DAY

## 2019-06-03 PROCEDURE — 85025 COMPLETE CBC W/AUTO DIFF WBC: CPT

## 2019-06-03 PROCEDURE — 6360000002 HC RX W HCPCS: Performed by: INTERNAL MEDICINE

## 2019-06-03 PROCEDURE — 80048 BASIC METABOLIC PNL TOTAL CA: CPT

## 2019-06-03 PROCEDURE — 94640 AIRWAY INHALATION TREATMENT: CPT

## 2019-06-03 RX ORDER — LEVOFLOXACIN 750 MG/1
750 TABLET ORAL DAILY
Qty: 10 TABLET | Refills: 0 | Status: ON HOLD | OUTPATIENT
Start: 2019-06-04 | End: 2019-06-14 | Stop reason: HOSPADM

## 2019-06-03 RX ADMIN — IBUPROFEN 400 MG: 400 TABLET, FILM COATED ORAL at 13:15

## 2019-06-03 RX ADMIN — METHYLPREDNISOLONE SODIUM SUCCINATE 40 MG: 40 INJECTION, POWDER, FOR SOLUTION INTRAMUSCULAR; INTRAVENOUS at 06:03

## 2019-06-03 RX ADMIN — PANTOPRAZOLE SODIUM 40 MG: 40 TABLET, DELAYED RELEASE ORAL at 06:03

## 2019-06-03 RX ADMIN — IPRATROPIUM BROMIDE AND ALBUTEROL SULFATE 1 AMPULE: .5; 3 SOLUTION RESPIRATORY (INHALATION) at 08:19

## 2019-06-03 RX ADMIN — BENZONATATE 100 MG: 100 CAPSULE ORAL at 09:43

## 2019-06-03 RX ADMIN — IPRATROPIUM BROMIDE AND ALBUTEROL SULFATE 1 AMPULE: .5; 3 SOLUTION RESPIRATORY (INHALATION) at 12:58

## 2019-06-03 RX ADMIN — COLLAGENASE SANTYL: 250 OINTMENT TOPICAL at 09:44

## 2019-06-03 RX ADMIN — BUDESONIDE 1000 MCG: 0.25 SUSPENSION RESPIRATORY (INHALATION) at 08:20

## 2019-06-03 RX ADMIN — FORMOTEROL FUMARATE DIHYDRATE 20 MCG: 20 SOLUTION RESPIRATORY (INHALATION) at 08:19

## 2019-06-03 RX ADMIN — IBUPROFEN 400 MG: 400 TABLET, FILM COATED ORAL at 09:43

## 2019-06-03 RX ADMIN — LEVOFLOXACIN 750 MG: 500 TABLET, FILM COATED ORAL at 09:43

## 2019-06-03 ASSESSMENT — PAIN SCALES - GENERAL
PAINLEVEL_OUTOF10: 0

## 2019-06-03 NOTE — PROGRESS NOTES
PROGRESS  NOTE --                                                          INTERNAL  MEDICINE                                                                              I  PERSONALLY SAW , EXAMINED, AND CARED [address], 6/3/2019     LABS, XRAY ,CHART, AND MEDICATIONS  REVIEWED BY ME .      PATIENT PROBLEM LIST:  Principal Problem:    Acute and chronic respiratory failure with hypoxia (HCC)  Active Problems:    Left upper lobe pneumonia (HCC)    COPD (chronic obstructive pulmonary disease) (HCC)    Bullous emphysema (HCC)    Pulmonary emphysema with fibrosis of lung (HCC)    Thoracic ascending aortic aneurysm (HCC)    Pneumonia, bacterial    S/P lumbar fusion  Resolved Problems:    * No resolved hospital problems. *           6/1/19-SUBJECTIVE: Uziel Craft is alert awake and cooperative; oriented ×3. Denies any chest pain dyspnea nausea emesis. Tolerating diet. No abdominal pain. Patient has been seen by cardiology as well as infectious disease. ID believes skin culture is colonization not infection. Antibiotics have been altered however, cefepime 2 g IV every 12 hours as well as vancomycin 2 g IV every 24 hours. Temperature 98.0 respirations 18 pulse 86 blood pressure 110/6193% saturation on 4 L nasal cannula. Sodium 138 potassium 4.0 chloride 102 CO2 24 BUN 9 creatinine 0.5 magnesium 1.9 glucose 170 calcium 9.2 phosphorus 2.7 hemoglobin 12.1 WBC 12.5 platelets 000. A1c 5.4. Pro-calcitonin 0.08. Lactic acid on admission elevated 2.4. Viral respiratory panel was negative.  Sputum Gram stain with following results--    Smear, Respiratory 05/31/2019  8:00 AM  - [facility]   Group 6: <25 PMN's/LPF and <25 Epithelial cells/LPF   Polymorphonuclear leukocytes not seen   Epithelial cells not seen   Few Gram positive cocci   Few Gram positive rods   Rare Gram negative rods      MRI lumbar spine completed with the following sclera white. PERRL,EOM-I  Ears: External ears normal  Nose/Sinuses: Nares normal. Septum midline. Mucosa normal. No drainage  Oropharynx: Oropharynx clear with no exudate seen  Neck: Supple. No jugular venous distension, lymphadenopathy or thyromegaly Trachea midline  Lungs: Minimal rhonchi left lung right lung clear  Heart: S1 S2  Regular rate and rhythm. No rub, murmur or gallop  Abdomen: Soft, non-tender. BS normal. No masses, organomegaly; no rebound or guarding  Extremities: No edema, Peripheral pulses palpable  Musculoskeletal: Muscular strength appears intact. Neuro:  No focal motor defects ; II-XII grossly intact . GLASER equally  Back: 3 cm vertical wound area of L3 no evidence of infection or drainage granulation tissue present    TELEMETRY: REVIEWED--Telemetry: Sinus    ASSESSMENT:   Principal Problem:    Acute and chronic respiratory failure with hypoxia (HCC)  Active Problems:    Left upper lobe pneumonia (HCC)    COPD (chronic obstructive pulmonary disease) (HCC)    Bullous emphysema (HCC)    Pulmonary emphysema with fibrosis of lung (HCC)    Thoracic ascending aortic aneurysm (HCC)    Pneumonia, bacterial    S/P lumbar fusion  Resolved Problems:    * No resolved hospital problems. *      PLAN:  SEE ORDERS      RE  CHANGES AND FINDINGS   Medications reviewed with patient  GI prophylaxis  DVT prophylaxis  Consultants notes reviewed   Extended discussion with patient regarding hypoglycemia, due to steroids, need for insulin coverage etc. He had been refusing insulin, he agrees to accept that at this time. Glucose on admission 244; A1c 5.4.   Sliding scale insulin  Methylprednisolone 40 mg IV every 12 hours  Replace potassium phosphate  Vancomycin 2 g IV every 24 hours  Maxipime 2 g IV every 12 hours  Aerosol treatments  Sliding scale insulin  Methylprednisolone 40 mg IV every 12 hours  Maxipime 2 g IV every 12 hours  Vancomycin has been stopped by ID  Monitor labs  Aerosol treatments  Med Av, Min:74, Max:79      Pulse ox Range: SpO2  Av.5 %  Min: 94 %  Max: 95 %    Patient Vitals for the past 8 hrs:   BP Temp Temp src Pulse Resp SpO2   19 0930 136/74 98.8 °F (37.1 °C) Oral 74 18 94 %   19 0823 -- -- -- -- 18 95 %   19 0822 -- -- -- -- 18 95 %         Intake/Output Summary (Last 24 hours) at 6/3/2019 1254  Last data filed at 6/3/2019 1236  Gross per 24 hour   Intake 3495 ml   Output --   Net 3495 ml       I/O last 3 completed shifts:   In: 1150 [I.V.:1150]  Out: -     I/O this shift:  In: 8859 [P.O.:480; I.V.:1865]  Out: -     Wt Readings from Last 3 Encounters:   19 213 lb (96.6 kg)   19 205 lb (93 kg)   19 202 lb (91.6 kg)       Labs:   CBC:   Lab Results   Component Value Date    WBC 8.0 2019    RBC 4.15 2019    HGB 12.3 2019    HCT 37.5 2019    MCV 90.4 2019    MCH 29.6 2019    MCHC 32.8 2019    RDW 14.8 2019     2019    MPV 9.7 2019     CBC with Differential:    Lab Results   Component Value Date    WBC 8.0 2019    RBC 4.15 2019    HGB 12.3 2019    HCT 37.5 2019     2019    MCV 90.4 2019    MCH 29.6 2019    MCHC 32.8 2019    RDW 14.8 2019    LYMPHOPCT 13.6 2019    MONOPCT 7.5 2019    BASOPCT 0.2 2019    MONOSABS 0.60 2019    LYMPHSABS 1.09 2019    EOSABS 0.00 2019    BASOSABS 0.02 2019     Hemoglobin/Hematocrit:    Lab Results   Component Value Date    HGB 12.3 2019    HCT 37.5 2019     CMP:    Lab Results   Component Value Date     2019    K 4.1 2019    K 4.4 2019     2019    CO2 27 2019    BUN 10 2019    CREATININE 0.6 2019    GFRAA >60 2019    LABGLOM >60 2019    GLUCOSE 93 2019    PROT 6.5 2019    LABALBU 3.6 2019    CALCIUM 9.2 2019    BILITOT 0.9 2019    ALKPHOS 73 05/30/2019    AST 12 05/30/2019    ALT 7 05/30/2019     BMP:    Lab Results   Component Value Date     06/03/2019    K 4.1 06/03/2019    K 4.4 04/22/2019     06/03/2019    CO2 27 06/03/2019    BUN 10 06/03/2019    LABALBU 3.6 05/30/2019    CREATININE 0.6 06/03/2019    CALCIUM 9.2 06/03/2019    GFRAA >60 06/03/2019    LABGLOM >60 06/03/2019    GLUCOSE 93 06/03/2019     Hepatic Function Panel:    Lab Results   Component Value Date    ALKPHOS 73 05/30/2019    ALT 7 05/30/2019    AST 12 05/30/2019    PROT 6.5 05/30/2019    BILITOT 0.9 05/30/2019    LABALBU 3.6 05/30/2019     Magnesium:    Lab Results   Component Value Date    MG 1.9 06/03/2019     Phosphorus:    Lab Results   Component Value Date    PHOS 2.9 06/03/2019     Uric Acid:    Lab Results   Component Value Date    LABURIC 3.5 05/31/2019     PT/INR:    Lab Results   Component Value Date    PROTIME 12.5 05/30/2019    INR 1.1 05/30/2019     Warfarin PT/INR:  No components found for: PTPATWAR, PTINRWAR  PTT:    Lab Results   Component Value Date    APTT 31.4 05/30/2019   [APTT}  Troponin:    Lab Results   Component Value Date    TROPONINI <0.01 05/31/2019     Last 3 Troponin:    Lab Results   Component Value Date    TROPONINI <0.01 05/31/2019    TROPONINI <0.01 05/28/2019    TROPONINI <0.01 04/22/2019     U/A:    Lab Results   Component Value Date    COLORU Yellow 05/30/2019    PROTEINU 30 05/30/2019    PHUR 5.5 05/30/2019    LABCAST MANY 05/30/2019    WBCUA 0-1 05/30/2019    RBCUA NONE 05/30/2019    MUCUS Present 04/22/2019    BACTERIA NONE 05/30/2019    CLARITYU Clear 05/30/2019    SPECGRAV 1.020 05/30/2019    LEUKOCYTESUR Negative 05/30/2019    UROBILINOGEN 1.0 05/30/2019    BILIRUBINUR LARGE 05/30/2019    BLOODU Negative 05/30/2019    GLUCOSEU 100 05/30/2019     ABG:    Lab Results   Component Value Date    PH 7.440 05/28/2019    PCO2 32.1 05/28/2019    PO2 59.1 05/28/2019    HCO3 21.3 05/28/2019    BE -1.8 05/28/2019    O2SAT 91.6 05/28/2019 Date    TRIG 53 05/31/2019    HDL 50 05/31/2019    LDLCALC 72 05/31/2019    CHOL 133 05/31/2019        Lab Results   Component Value Date    LABA1C 5.4 05/31/2019            RADIOLOGY: REVIEWED AVAILABLE REPORT  CT Chest WO Contrast   Final Result   Severe emphysematous change of the lungs with a consolidative left   upper lobe pneumonia along the anterior mediastinal border. There is   no associated dependent effusion, but there is mediastinal adenopathy. MRI LUMBAR SPINE W WO CONTRAST   Final Result   1. Transitional vessel in the lumbar sacral junction, see above   comments regarding the level determination. 2. Previous laminectomy L4 and L5 level with posterior fusion with   interpedicular screws. 3. Post operatory changes with well delineated hematoma/seroma   formation, one in the site of the laminectomy and the other in   posterior retrovertebral midline subcutaneous fat. No conspicuous   abnormal postcontrast enhancement identified. However can not   determined by imaging if they are not or if they are infected. Clinical correlation needed. 4. No demonstration of an epidural abscess . 5. Subchondral edema without specific enhancement in the   intervertebral disc at L4-5 more likely secondary to degenerative disc   disease. XR Eye Foreign Body   Final Result   No radiopaque foreign body is identified      XR CHEST PORTABLE   Final Result   Significant COPD and bilateral parenchymal fibrosis as well as pleural   thickening in the right costophrenic angle. This is unchanged.       New infiltrate suggested in left mid hemithorax                         [address]  DO   12:54 PM     6/3/2019      Voice recognition software used for dictation

## 2019-06-03 NOTE — PLAN OF CARE
Problem: Physical Regulation:  Goal: Will remain free from infection  Description  Will remain free from infection  Outcome: Ongoing  Goal: Ability to maintain vital signs within normal range will improve  Description  Ability to maintain vital signs within normal range will improve  Outcome: Ongoing     Problem: Skin Integrity:  Goal: Demonstration of wound healing without infection will improve  Description  Demonstration of wound healing without infection will improve  Outcome: Ongoing  Goal: Will show no infection signs and symptoms  Description  Will show no infection signs and symptoms  Outcome: Ongoing  Goal: Absence of new skin breakdown  Description  Absence of new skin breakdown  Outcome: Ongoing

## 2019-06-04 ENCOUNTER — CARE COORDINATION (OUTPATIENT)
Dept: CASE MANAGEMENT | Age: 69
End: 2019-06-04

## 2019-06-04 LAB
BLOOD CULTURE, ROUTINE: NORMAL
CULTURE, BLOOD 2: NORMAL

## 2019-06-04 NOTE — CARE COORDINATION
BPCI-A PROGRAM Opal Tineolorena  19    [facility] 45 Transitions Initial Follow Up Call    Call within 2 business days of discharge: Yes    Patient: Cathrine Siemens Patient : 1950   MRN: [id number]  Reason for Admission: PNEUMONIA   Discharge Date: 6/3/19 RARS: Readmission Risk Score: 12      Last Discharge [address] [address]       Complaint Diagnosis Description Type Department Provider    19 Shortness of Breath; Fever HCAP (healthcare-associated pneumonia) . .. ED to Hosp-Admission (Discharged) (ADMITTED) Moi Abebe DO; Jacinto Greenberg. .. Facility[facility]    First attempt to reach the patient for BPCI-A Care Transition call post hospital discharge. Message left with CTC's contact information requesting return phone call.     Follow Up  Future Appointments   Date Time Provider Tan Miranda   2019  2:30 PM MIGUE Kaplan CNP [facility]   2019  3:00 PM MIGUE Kaplan CNP [facility]       Mayito Gonzalez RN

## 2019-06-05 NOTE — CARE COORDINATION
[facility] Transitions Initial Follow Up Call    Call within 2 business days of discharge: Yes    Patient: Ivonne Aleman Patient : 1950   MRN: [id number]  Reason for Admission: HCAP, fever  Discharge Date: 6/3/19 RARS: Readmission Risk Score: 12      Last Discharge [facility]       Complaint Diagnosis Description Type Department Provider    19 Shortness of Breath; Fever HCAP (healthcare-associated pneumonia) . .. ED to Hosp-Admission (Discharged) (ADMITTED) Moi Abebe DO; Tracy Browning. .. Spoke with: M[address]: [facility]     Non-face-to-face services provided:  Obtained and reviewed discharge summary and/or continuity of care documents    Care Transitions 24 Hour Call    Do you have any ongoing symptoms?:  No  Do you have a copy of your discharge instructions?:  Yes  Do you have all of your prescriptions and are they filled?:  Yes  Have you been contacted by a [address]?:  No  Have you scheduled your follow up appointment?:  Yes  How are you going to get to your appointment?:  Car - family or friend to transport  Were you discharged with any Home Care or Post Acute Services:  No  Do you feel like you have everything you need to keep you well at home?:  Yes  Care Transitions Interventions  No Identified Needs       Spoke with Jose E Lebron for initial BPCI care transition call post hospital discharge. Explained the role of Care Transition Coordinator and the BPCI-A program. CMS BPCI-A letter mailed 19, patient is agreeable to follow up post discharge from the hospital.     Jose E Enzoclarissa reports that he was still not feeling well yesterday, he had a fever of 100.5 and felt dizzy most of the day. He reports taking \"something over the counter\" for his fever yesterday and denies a fever today. He reports that the dizziness is now gone and he is feeling better. He continues to have a productive cough and is expectorating yellow mucus.  His SOB remains just above his baseline and he is wearing 4L O2. He reports seeing Dr. Brenna Alston for his back incision yesterday and stated he cleaned the site and it looks good. Med review completed. He stated he will be seeing Dr. Vicente Solorzano early next week but cannot remember the exact day. CTC explained that a member of the Care Transition Central Team will be contacting him for further follow up calls, he is in agreement and denies any other needs or concerns at this time.       Follow Up  Future Appointments   Date Time Provider Tan Miranda   6/20/2019  2:30 PM MIGUE Hugo CNP [facility]   9/17/2019  3:00 PM MIGUE Hugo CNP [facility]       Isaac Naylor RN

## 2019-06-06 NOTE — DISCHARGE SUMMARY
DISCHARGE SUMMARY  Patient ID:  Lilia Motta  [id number]  79 y.o.  1950    Admit date: 5/30/2019      Discharge date : 6/3/19      Admission Diagnoses: Pneumonia, bacterial [J15.9]  Pneumonia, bacterial [J15.9]    Discharge Diagnosis  Principal Problem:    Acute and chronic respiratory failure with hypoxia ([facility] [address]  Active Problems:    Left upper lobe pneumonia (HCC)    COPD (chronic obstructive pulmonary disease) (HCC)    Bullous emphysema (HCC)    Pulmonary emphysema with fibrosis of lung ([facility] [address]    Thoracic ascending aortic aneurysm (HCC)    Pneumonia, bacterial    S/P lumbar fusion  Resolved Problems:    * No resolved hospital problems. *      Hospital Course: CHIEF COMPLAINT: Healthcare acquired pneumonia; short of breath.       History of Present Illness: 80-year-old male patient of Dr. Chasity Stinson I'm asked to admit and follow. Patient was discharged from this hospital 4/26/19 after an episode of pneumonia. The patient had lumbar fusion surgery performed at an outside hospital for/1/19 2019. Patient works as a . After all the above, he was feeling fine and had returned to work. On Labor Day, 5/27/19 patient was preparing cookout for others. He did most of the cooking. At the end of the day he felt exhausted and short of breath. That evening he was chilled; next morning a temperature of 101. On 5/28/19 he presented to the ED. At that time chest x-ray was done and interpreted simply of \"chronic obstructive pulmonary disease\". CBC at that time revealed WC of 10.9 hemoglobin 15.1. Patient states the emergency room was extremely busy that day, and medications and very cold ED ambient temperature. The patient was felt to be well enough and was discharged home. He returned to the ED on 5/30/19 with chest x-ray revealing new infiltrate in left mid hemithorax. Patient is chronically on 3.5 L oxygen nasal cannula at home.  He reported that his pulse oximetry was 71% on room air.           6/1/19-SUBJECTIVE: epidural spaces more than the enhancement in  epidural veins. There is a fluid accumulation in the site of the previous laminectomy  measuring about 3.2 x 2.5 x 2.2 cm which cause some IMPRESSION the  posterior aspect of the thecal sac but the canal is decompressed by  the laminectomy. There is another fluid accumulation in the midline retrovertebral  region within the subcutaneous fat, superficial to the posterior  muscular fascia measuring 5.6 x 1.3 x 2.3 cm. Cannot see conspicuously  postcontrast enhancement. This can represent a post operatory  seroma/hematoma but the present study cannot be determined if they are  infected or not infected. This requires correlation with clinical  data. There are some areas of subchondral edema in the inferior endplate of  L4 and superior endplate of L5 with some increased signal in the  intervertebral disc but they cannot see conspicuous postcontrast  enhancement. This more likely on degenerative changes from  degenerative disc process. There are unremarkable appearance for the contents of the thecal sac. No abnormal intradural enhancements are seen. There is normal  appearance for the distal thoracic spinal cord, conus medullaris and  nerve roots. The canal is decompressed where laminectomy has been performed in  between the pedicles of L4 and L5. At L5-transitional level there is  some degree of spine canal stenosis in mild-to-moderate degree. This  is below the level of the laminectomy.       Impression:       1. Transitional vessel in the lumbar sacral junction, see above  comments regarding the level determination. 2. Previous laminectomy L4 and L5 level with posterior fusion with  interpedicular screws. 3. Post operatory changes with well delineated hematoma/seroma  formation, one in the site of the laminectomy and the other in  posterior retrovertebral midline subcutaneous fat. No conspicuous  abnormal postcontrast enhancement identified.  However can not  determined by imaging if they are not or if they are infected. Clinical correlation needed. 4. No demonstration of an epidural abscess . 5. Subchondral edema without specific enhancement in the  intervertebral disc at L4-5 more likely secondary to degenerative disc  disease.       CT of the chest completed with the following results--          FINDINGS:  Lung window images: There is severe subpleural and centrilobular  emphysematous changes throughout the lungs, with a superimposed  consolidative pneumonia in the left upper lung medially, along the  anterior chest wall at about the level of the gualberto. There is no  dependent effusion. There is some mild interstitial fibrosis or  scarring in both lower lungs. Soft tissue window images:  Soft tissue window images show no evidence  of thoracic inlet or axillary adenopathy, but there is prominent AP  window, pretracheal/retrocaval, and left periaortic adenopathy. Coronary artery calcification is present, but there is no evidence of  cardiac enlargement or decompensation. There is elevation of the  lateral right hemidiaphragm. Upper abdomen: Noncontrast imaging shows limited detail, but no  visceral abnormalities are noted to the level of the mid kidneys    Skeletal: Unremarkable       Impression:       Severe emphysematous change of the lungs with a consolidative left  upper lobe pneumonia along the anterior mediastinal border. There is  no associated dependent effusion, but there is mediastinal adenopathy.      6/2/19-patient sitting on side of bed; no complaints voiced. No chest pain minimal dyspnea with exertion. He has been up to the bathroom; dyspnea occurs without oxygen. He was walking halls, mild desaturation but did not feel short of breath while on oxygen. Temperature 97.9 respirations 18 pulse 68 blood pressure 131/74. 4 L with saturation 93%.  Sodium 138 potassium 4.1 chloride 103 CO2 26 BUN 9 creatinine 0.6 magnesium 1.9 glucose 146 calcium 9.8 phosphorus 2.6 hemoglobin 12.5 WBC 9.1 platelets 569. Culture results unchanged.     6/3/19-patient sitting quietly in bed, finishing his aerosol treatment. Wife present through the exam. No chest pain or dyspnea. He was seen late yesterday by ID; IV antibiotics stopped he been placed on oral Levaquin. Has been seen earlier today by pulmonary, cleared for discharge. Patient agrees with the above. Temperature 98.8 respirations 18 pulse 74 blood pressure 136/7494% saturation on 4 L nasal cannula. Sodium 138 potassium 4.1 chloride 101 CO2 27 BUN 10 creatinine 0.6 magnesium 1.9 glucose 93 calcium 9.2 phosphorus 2.9 hemoglobin 12.3 WBC 8.0 platelets 310.             Instructions at discharge:    Med reconciliation completed  Prescriptions written  Leilani Witt one week  Follow-up Dr. Alisia Schmidt 1-2 weeks  Consideration for lung transplant in the future according to pulmonary note  Continue aerosol treatments at home        Condition at DISCHARGE : [id number]     Discharged to:  Home    Discharge Instructions: Medications reviewed with patient    Consults:cardiology, pulmonary/intensive care and ID     Past Medical Hx :   Past Medical History:   Diagnosis Date    Acute and chronic respiratory failure with hypoxia ([address] 10/12/2017    Acute respiratory failure with hypoxia ([address] 11/12/2018    Bullous emphysema ([address] 11/12/2018    Colon cancer screening     COPD (chronic obstructive pulmonary disease) ([address]     Cough with hemoptysis 4/23/2019    Left upper lobe pneumonia ([address] 10/12/2017    Pulmonary emphysema with fibrosis of lung ([address] 11/15/2018    Right lower lobe pneumonia ([address] 11/15/2018    Recurrent 4/23/19    S/P lumbar fusion 6/1/2019    Thoracic ascending aortic aneurysm ([address] 11/15/2018    Proximal ascending aorta; 3.8 cm; 11/15/18       Past Surgical Hx :  Past Surgical History:   Procedure Laterality Date    ABSCESS DRAINAGE  2006    X2 RECTAL ABSCESS    Value Date    MG 1.9 06/03/2019     Phosphorus:    Lab Results   Component Value Date    PHOS 2.9 06/03/2019     Uric Acid:    Lab Results   Component Value Date    LABURIC 3.5 05/31/2019     PT/INR:    Lab Results   Component Value Date    PROTIME 12.5 05/30/2019    INR 1.1 05/30/2019     Warfarin PT/INR:  No components found for: PTPATWAR, PTINRWAR  PTT:    Lab Results   Component Value Date    APTT 31.4 05/30/2019   [APTT}  Troponin:    Lab Results   Component Value Date    TROPONINI <0.01 05/31/2019     Last 3 Troponin:    Lab Results   Component Value Date    TROPONINI <0.01 05/31/2019    TROPONINI <0.01 05/28/2019    TROPONINI <0.01 04/22/2019     U/A:    Lab Results   Component Value Date    COLORU Yellow 05/30/2019    PROTEINU 30 05/30/2019    PHUR 5.5 05/30/2019    LABCAST MANY 05/30/2019    WBCUA 0-1 05/30/2019    RBCUA NONE 05/30/2019    MUCUS Present 04/22/2019    BACTERIA NONE 05/30/2019    CLARITYU Clear 05/30/2019    SPECGRAV 1.020 05/30/2019    LEUKOCYTESUR Negative 05/30/2019    UROBILINOGEN 1.0 05/30/2019    BILIRUBINUR LARGE 05/30/2019    BLOODU Negative 05/30/2019    GLUCOSEU 100 05/30/2019     ABG:    Lab Results   Component Value Date    PH 7.440 05/28/2019    PCO2 32.1 05/28/2019    PO2 59.1 05/28/2019    HCO3 21.3 05/28/2019    BE -1.8 05/28/2019    O2SAT 91.6 05/28/2019     HgBA1c:    Lab Results   Component Value Date    LABA1C 5.4 05/31/2019     FLP:    Lab Results   Component Value Date    TRIG 53 05/31/2019    HDL 50 05/31/2019    LDLCALC 72 05/31/2019    LABVLDL 11 05/31/2019     TSH:    Lab Results   Component Value Date    TSH 0.475 05/31/2019     VITAMIN B12: No components found for: B12  FOLATE:    Lab Results   Component Value Date    FOLATE 13.4 05/31/2019     AMYLASE:  No results found for: AMYLASE  LIPASE:    Lab Results   Component Value Date    LIPASE 16 05/30/2019          CBC:No results for input(s): WBC, RBC, HGB, HCT, PLT, MCV, MCH, MCHC, RDW, NRBC, SEGSPCT, BANDSPCT, 2019 FINDINGS:  There is significant hyperinflation of the lungs and significant generalized parenchymal fibrosis. There is pleural thickening in the right costophrenic angle. There is a new infiltrate in the left mid hemithorax. Significant COPD and bilateral parenchymal fibrosis as well as pleural thickening in the right costophrenic angle. This is unchanged. New infiltrate suggested in left mid hemithorax     Xr Eye Foreign Body    Result Date: 2019  Patient MRN: [id number] : 1950 Age:  79 years Gender: Male Order Date: 2019 2:30 PM Exam: XR EYE FOREIGN BODY Number of Images: 2 views Indication: Foreign body Comparison: None. Findings: No radiopaque foreign body is identified    No radiopaque foreign body is identified      Discharge Exam:  See progress note from today    Discharge Medication List as of 6/3/2019  2:36 PM      START taking these medications    Details   levofloxacin (LEVAQUIN) 750 MG tablet Take 1 tablet by mouth daily for 10 days, Disp-10 tablet, R-0Normal         CONTINUE these medications which have NOT CHANGED    Details   Fluticasone-Umeclidin-Vilant (TRELEGY ELLIPTA) 100-62.5-25 MCG/INH AEPB Inhale 1 puff into the lungs daily, Disp-2 each, R-0Sample      OXYGEN Inhale 3.5 L into the lungs continuous prn Historical Med      etodolac (LODINE) 500 MG tablet Take 500 mg by mouth every morning Historical Med      albuterol sulfate  (90 Base) MCG/ACT inhaler Inhale 2 puffs into the lungs every 6 hours as needed for Wheezing or Shortness of BreathHistorical Med             Time Spent on discharge is more than 30 minutes --      TIME  INCLUDES TIME THAT WAS  SPENT WITH DISCHARGE PAPERS, MEDICATION REVIEW, MEDICATION RECONCILIATION,   PRESCRIPTIONS, CHART REVIEW, PATIENT EXAM , FINAL PROGRESS NOTE, DISCUSSION OF FINDINGS WITH PATIENT AND AVAILABLE FAMILY , AND DICTATION  WHERE NEEDED ;  Home Health Care [facility]) FORMS COMPLETED ; N-17  COMPLETION ;  H&P UPDATED ; DURABLE

## 2019-06-09 ENCOUNTER — APPOINTMENT (OUTPATIENT)
Dept: CT IMAGING | Age: 69
DRG: 193 | End: 2019-06-09
Payer: MEDICARE

## 2019-06-09 ENCOUNTER — APPOINTMENT (OUTPATIENT)
Dept: GENERAL RADIOLOGY | Age: 69
DRG: 193 | End: 2019-06-09
Payer: MEDICARE

## 2019-06-09 ENCOUNTER — HOSPITAL ENCOUNTER (INPATIENT)
Age: 69
LOS: 5 days | Discharge: HOME | DRG: 193 | End: 2019-06-14
Attending: EMERGENCY MEDICINE | Admitting: INTERNAL MEDICINE
Payer: MEDICARE

## 2019-06-09 ENCOUNTER — TELEPHONE (OUTPATIENT)
Dept: OTHER | Facility: CLINIC | Age: 69
End: 2019-06-09

## 2019-06-09 DIAGNOSIS — J96.21 ACUTE AND CHRONIC RESPIRATORY FAILURE WITH HYPOXIA (HCC): ICD-10-CM

## 2019-06-09 DIAGNOSIS — A41.9 SEPTICEMIA (HCC): ICD-10-CM

## 2019-06-09 DIAGNOSIS — J44.9 CHRONIC OBSTRUCTIVE PULMONARY DISEASE, UNSPECIFIED COPD TYPE (HCC): ICD-10-CM

## 2019-06-09 DIAGNOSIS — J18.9 HCAP (HEALTHCARE-ASSOCIATED PNEUMONIA): Primary | ICD-10-CM

## 2019-06-09 PROBLEM — J96.20 RESPIRATORY FAILURE, ACUTE-ON-CHRONIC (HCC): Status: ACTIVE | Noted: 2019-06-09

## 2019-06-09 LAB
ANION GAP SERPL CALCULATED.3IONS-SCNC: 14 MMOL/L (ref 7–16)
BACTERIA: ABNORMAL /HPF
BASOPHILS ABSOLUTE: 0.02 E9/L (ref 0–0.2)
BASOPHILS RELATIVE PERCENT: 0.1 % (ref 0–2)
BILIRUBIN URINE: ABNORMAL
BLOOD, URINE: NEGATIVE
BUN BLDV-MCNC: 11 MG/DL (ref 8–23)
C-REACTIVE PROTEIN: 5.7 MG/DL (ref 0–0.4)
CALCIUM SERPL-MCNC: 9.2 MG/DL (ref 8.6–10.2)
CHLORIDE BLD-SCNC: 96 MMOL/L (ref 98–107)
CLARITY: CLEAR
CO2: 20 MMOL/L (ref 22–29)
COLOR: YELLOW
CREAT SERPL-MCNC: 0.7 MG/DL (ref 0.7–1.2)
D DIMER: 669 NG/ML DDU
EKG ATRIAL RATE: 122 BPM
EKG P AXIS: 21 DEGREES
EKG P-R INTERVAL: 154 MS
EKG Q-T INTERVAL: 322 MS
EKG QRS DURATION: 90 MS
EKG QTC CALCULATION (BAZETT): 458 MS
EKG R AXIS: -8 DEGREES
EKG T AXIS: 54 DEGREES
EKG VENTRICULAR RATE: 122 BPM
EOSINOPHILS ABSOLUTE: 0.03 E9/L (ref 0.05–0.5)
EOSINOPHILS RELATIVE PERCENT: 0.2 % (ref 0–6)
FOLATE: 11.4 NG/ML (ref 4.8–24.2)
GFR AFRICAN AMERICAN: >60
GFR NON-AFRICAN AMERICAN: >60 ML/MIN/1.73
GLUCOSE BLD-MCNC: 110 MG/DL (ref 74–99)
GLUCOSE URINE: NEGATIVE MG/DL
HBA1C MFR BLD: 5.4 % (ref 4–5.6)
HCT VFR BLD CALC: 40.8 % (ref 37–54)
HEMOGLOBIN: 14.1 G/DL (ref 12.5–16.5)
IMMATURE GRANULOCYTES #: 0.12 E9/L
IMMATURE GRANULOCYTES %: 0.7 % (ref 0–5)
KETONES, URINE: 15 MG/DL
LACTIC ACID, SEPSIS: 0.9 MMOL/L (ref 0.5–1.9)
LEUKOCYTE ESTERASE, URINE: NEGATIVE
LYMPHOCYTES ABSOLUTE: 0.61 E9/L (ref 1.5–4)
LYMPHOCYTES RELATIVE PERCENT: 3.5 % (ref 20–42)
MCH RBC QN AUTO: 30.3 PG (ref 26–35)
MCHC RBC AUTO-ENTMCNC: 34.6 % (ref 32–34.5)
MCV RBC AUTO: 87.7 FL (ref 80–99.9)
MONOCYTES ABSOLUTE: 0.94 E9/L (ref 0.1–0.95)
MONOCYTES RELATIVE PERCENT: 5.4 % (ref 2–12)
NEUTROPHILS ABSOLUTE: 15.77 E9/L (ref 1.8–7.3)
NEUTROPHILS RELATIVE PERCENT: 90.1 % (ref 43–80)
NITRITE, URINE: NEGATIVE
PDW BLD-RTO: 14.8 FL (ref 11.5–15)
PH UA: 6 (ref 5–9)
PLATELET # BLD: 265 E9/L (ref 130–450)
PMV BLD AUTO: 9.5 FL (ref 7–12)
POTASSIUM SERPL-SCNC: 4 MMOL/L (ref 3.5–5)
PRO-BNP: 186 PG/ML (ref 0–125)
PROCALCITONIN: 0.1 NG/ML (ref 0–0.08)
PROTEIN UA: NEGATIVE MG/DL
RBC # BLD: 4.65 E12/L (ref 3.8–5.8)
RBC UA: ABNORMAL /HPF (ref 0–2)
SEDIMENTATION RATE, ERYTHROCYTE: 32 MM/HR (ref 0–15)
SODIUM BLD-SCNC: 130 MMOL/L (ref 132–146)
SPECIFIC GRAVITY UA: 1.01 (ref 1–1.03)
TROPONIN: <0.01 NG/ML (ref 0–0.03)
UROBILINOGEN, URINE: 0.2 E.U./DL
VITAMIN B-12: 351 PG/ML (ref 211–946)
WBC # BLD: 17.5 E9/L (ref 4.5–11.5)
WBC UA: ABNORMAL /HPF (ref 0–5)

## 2019-06-09 PROCEDURE — 87450 HC DIRECT STREP B ANTIGEN: CPT

## 2019-06-09 PROCEDURE — 80048 BASIC METABOLIC PNL TOTAL CA: CPT

## 2019-06-09 PROCEDURE — 6360000002 HC RX W HCPCS: Performed by: STUDENT IN AN ORGANIZED HEALTH CARE EDUCATION/TRAINING PROGRAM

## 2019-06-09 PROCEDURE — 87040 BLOOD CULTURE FOR BACTERIA: CPT

## 2019-06-09 PROCEDURE — 96367 TX/PROPH/DG ADDL SEQ IV INF: CPT

## 2019-06-09 PROCEDURE — 6370000000 HC RX 637 (ALT 250 FOR IP): Performed by: INTERNAL MEDICINE

## 2019-06-09 PROCEDURE — 93005 ELECTROCARDIOGRAM TRACING: CPT | Performed by: STUDENT IN AN ORGANIZED HEALTH CARE EDUCATION/TRAINING PROGRAM

## 2019-06-09 PROCEDURE — 85378 FIBRIN DEGRADE SEMIQUANT: CPT

## 2019-06-09 PROCEDURE — 71046 X-RAY EXAM CHEST 2 VIEWS: CPT

## 2019-06-09 PROCEDURE — 93010 ELECTROCARDIOGRAM REPORT: CPT | Performed by: INTERNAL MEDICINE

## 2019-06-09 PROCEDURE — 84484 ASSAY OF TROPONIN QUANT: CPT

## 2019-06-09 PROCEDURE — 2700000000 HC OXYGEN THERAPY PER DAY

## 2019-06-09 PROCEDURE — 87581 M.PNEUMON DNA AMP PROBE: CPT

## 2019-06-09 PROCEDURE — 83880 ASSAY OF NATRIURETIC PEPTIDE: CPT

## 2019-06-09 PROCEDURE — 6360000004 HC RX CONTRAST MEDICATION: Performed by: RADIOLOGY

## 2019-06-09 PROCEDURE — 2580000003 HC RX 258: Performed by: INTERNAL MEDICINE

## 2019-06-09 PROCEDURE — 87798 DETECT AGENT NOS DNA AMP: CPT

## 2019-06-09 PROCEDURE — 36415 COLL VENOUS BLD VENIPUNCTURE: CPT

## 2019-06-09 PROCEDURE — 6370000000 HC RX 637 (ALT 250 FOR IP): Performed by: STUDENT IN AN ORGANIZED HEALTH CARE EDUCATION/TRAINING PROGRAM

## 2019-06-09 PROCEDURE — 94761 N-INVAS EAR/PLS OXIMETRY MLT: CPT

## 2019-06-09 PROCEDURE — 87070 CULTURE OTHR SPECIMN AEROBIC: CPT

## 2019-06-09 PROCEDURE — 83036 HEMOGLOBIN GLYCOSYLATED A1C: CPT

## 2019-06-09 PROCEDURE — 94640 AIRWAY INHALATION TREATMENT: CPT

## 2019-06-09 PROCEDURE — 6360000002 HC RX W HCPCS: Performed by: INTERNAL MEDICINE

## 2019-06-09 PROCEDURE — 81001 URINALYSIS AUTO W/SCOPE: CPT

## 2019-06-09 PROCEDURE — 84145 PROCALCITONIN (PCT): CPT

## 2019-06-09 PROCEDURE — 94664 DEMO&/EVAL PT USE INHALER: CPT

## 2019-06-09 PROCEDURE — 83605 ASSAY OF LACTIC ACID: CPT

## 2019-06-09 PROCEDURE — 2060000000 HC ICU INTERMEDIATE R&B

## 2019-06-09 PROCEDURE — 96375 TX/PRO/DX INJ NEW DRUG ADDON: CPT

## 2019-06-09 PROCEDURE — 85025 COMPLETE CBC W/AUTO DIFF WBC: CPT

## 2019-06-09 PROCEDURE — 71275 CT ANGIOGRAPHY CHEST: CPT

## 2019-06-09 PROCEDURE — 87186 SC STD MICRODIL/AGAR DIL: CPT

## 2019-06-09 PROCEDURE — 2580000003 HC RX 258: Performed by: STUDENT IN AN ORGANIZED HEALTH CARE EDUCATION/TRAINING PROGRAM

## 2019-06-09 PROCEDURE — 82746 ASSAY OF FOLIC ACID SERUM: CPT

## 2019-06-09 PROCEDURE — 82607 VITAMIN B-12: CPT

## 2019-06-09 PROCEDURE — 96365 THER/PROPH/DIAG IV INF INIT: CPT

## 2019-06-09 PROCEDURE — 99285 EMERGENCY DEPT VISIT HI MDM: CPT

## 2019-06-09 PROCEDURE — 87486 CHLMYD PNEUM DNA AMP PROBE: CPT

## 2019-06-09 PROCEDURE — 86140 C-REACTIVE PROTEIN: CPT

## 2019-06-09 PROCEDURE — 85651 RBC SED RATE NONAUTOMATED: CPT

## 2019-06-09 PROCEDURE — 87633 RESP VIRUS 12-25 TARGETS: CPT

## 2019-06-09 RX ORDER — SODIUM CHLORIDE 9 MG/ML
INJECTION, SOLUTION INTRAVENOUS CONTINUOUS
Status: DISCONTINUED | OUTPATIENT
Start: 2019-06-09 | End: 2019-06-14 | Stop reason: HOSPADM

## 2019-06-09 RX ORDER — SODIUM CHLORIDE 0.9 % (FLUSH) 0.9 %
10 SYRINGE (ML) INJECTION PRN
Status: DISCONTINUED | OUTPATIENT
Start: 2019-06-09 | End: 2019-06-14 | Stop reason: HOSPADM

## 2019-06-09 RX ORDER — SODIUM CHLORIDE 0.9 % (FLUSH) 0.9 %
10 SYRINGE (ML) INJECTION 2 TIMES DAILY
Status: DISCONTINUED | OUTPATIENT
Start: 2019-06-09 | End: 2019-06-14 | Stop reason: HOSPADM

## 2019-06-09 RX ORDER — ACETAMINOPHEN 325 MG/1
650 TABLET ORAL EVERY 4 HOURS PRN
Status: DISCONTINUED | OUTPATIENT
Start: 2019-06-09 | End: 2019-06-14 | Stop reason: HOSPADM

## 2019-06-09 RX ORDER — METHYLPREDNISOLONE SODIUM SUCCINATE 125 MG/2ML
125 INJECTION, POWDER, LYOPHILIZED, FOR SOLUTION INTRAMUSCULAR; INTRAVENOUS ONCE
Status: COMPLETED | OUTPATIENT
Start: 2019-06-09 | End: 2019-06-09

## 2019-06-09 RX ORDER — FORMOTEROL FUMARATE 20 UG/2ML
20 SOLUTION RESPIRATORY (INHALATION) 2 TIMES DAILY
Status: DISCONTINUED | OUTPATIENT
Start: 2019-06-09 | End: 2019-06-14 | Stop reason: HOSPADM

## 2019-06-09 RX ORDER — BENZONATATE 100 MG/1
100 CAPSULE ORAL 3 TIMES DAILY
Status: DISCONTINUED | OUTPATIENT
Start: 2019-06-09 | End: 2019-06-10

## 2019-06-09 RX ORDER — IBUPROFEN 200 MG
200 TABLET ORAL
Status: DISCONTINUED | OUTPATIENT
Start: 2019-06-09 | End: 2019-06-14 | Stop reason: HOSPADM

## 2019-06-09 RX ORDER — ACETAMINOPHEN 500 MG
1000 TABLET ORAL ONCE
Status: COMPLETED | OUTPATIENT
Start: 2019-06-09 | End: 2019-06-09

## 2019-06-09 RX ORDER — 0.9 % SODIUM CHLORIDE 0.9 %
30 INTRAVENOUS SOLUTION INTRAVENOUS ONCE
Status: COMPLETED | OUTPATIENT
Start: 2019-06-09 | End: 2019-06-09

## 2019-06-09 RX ORDER — ONDANSETRON 2 MG/ML
4 INJECTION INTRAMUSCULAR; INTRAVENOUS EVERY 6 HOURS PRN
Status: DISCONTINUED | OUTPATIENT
Start: 2019-06-09 | End: 2019-06-14 | Stop reason: HOSPADM

## 2019-06-09 RX ORDER — ONDANSETRON 2 MG/ML
4 INJECTION INTRAMUSCULAR; INTRAVENOUS ONCE
Status: COMPLETED | OUTPATIENT
Start: 2019-06-09 | End: 2019-06-09

## 2019-06-09 RX ORDER — CODEINE PHOSPHATE AND GUAIFENESIN 10; 100 MG/5ML; MG/5ML
5 SOLUTION ORAL EVERY 4 HOURS PRN
Status: DISCONTINUED | OUTPATIENT
Start: 2019-06-09 | End: 2019-06-14 | Stop reason: HOSPADM

## 2019-06-09 RX ORDER — BUDESONIDE 0.25 MG/2ML
250 INHALANT ORAL 2 TIMES DAILY
Status: DISCONTINUED | OUTPATIENT
Start: 2019-06-09 | End: 2019-06-14 | Stop reason: HOSPADM

## 2019-06-09 RX ORDER — IPRATROPIUM BROMIDE AND ALBUTEROL SULFATE 2.5; .5 MG/3ML; MG/3ML
1 SOLUTION RESPIRATORY (INHALATION)
Status: DISCONTINUED | OUTPATIENT
Start: 2019-06-09 | End: 2019-06-14 | Stop reason: HOSPADM

## 2019-06-09 RX ORDER — IPRATROPIUM BROMIDE AND ALBUTEROL SULFATE 2.5; .5 MG/3ML; MG/3ML
3 SOLUTION RESPIRATORY (INHALATION) ONCE
Status: COMPLETED | OUTPATIENT
Start: 2019-06-09 | End: 2019-06-09

## 2019-06-09 RX ORDER — PANTOPRAZOLE SODIUM 40 MG/1
40 TABLET, DELAYED RELEASE ORAL
Status: DISCONTINUED | OUTPATIENT
Start: 2019-06-10 | End: 2019-06-14 | Stop reason: HOSPADM

## 2019-06-09 RX ORDER — METHYLPREDNISOLONE SODIUM SUCCINATE 125 MG/2ML
80 INJECTION, POWDER, LYOPHILIZED, FOR SOLUTION INTRAMUSCULAR; INTRAVENOUS EVERY 8 HOURS
Status: DISCONTINUED | OUTPATIENT
Start: 2019-06-09 | End: 2019-06-13

## 2019-06-09 RX ADMIN — CEFEPIME HYDROCHLORIDE 2 G: 2 INJECTION, POWDER, FOR SOLUTION INTRAVENOUS at 12:01

## 2019-06-09 RX ADMIN — ACETAMINOPHEN 1000 MG: 500 TABLET ORAL at 12:44

## 2019-06-09 RX ADMIN — IBUPROFEN 200 MG: 200 TABLET, FILM COATED ORAL at 16:46

## 2019-06-09 RX ADMIN — SODIUM CHLORIDE: 9 INJECTION, SOLUTION INTRAVENOUS at 15:10

## 2019-06-09 RX ADMIN — FORMOTEROL FUMARATE DIHYDRATE 20 MCG: 20 SOLUTION RESPIRATORY (INHALATION) at 22:05

## 2019-06-09 RX ADMIN — METHYLPREDNISOLONE SODIUM SUCCINATE 80 MG: 125 INJECTION, POWDER, LYOPHILIZED, FOR SOLUTION INTRAMUSCULAR; INTRAVENOUS at 19:55

## 2019-06-09 RX ADMIN — BENZONATATE 100 MG: 100 CAPSULE ORAL at 16:46

## 2019-06-09 RX ADMIN — ONDANSETRON 4 MG: 2 INJECTION INTRAMUSCULAR; INTRAVENOUS at 12:44

## 2019-06-09 RX ADMIN — SODIUM CHLORIDE 3000 ML: 9 INJECTION, SOLUTION INTRAVENOUS at 11:57

## 2019-06-09 RX ADMIN — BENZONATATE 100 MG: 100 CAPSULE ORAL at 19:55

## 2019-06-09 RX ADMIN — IPRATROPIUM BROMIDE AND ALBUTEROL SULFATE 3 AMPULE: .5; 3 SOLUTION RESPIRATORY (INHALATION) at 12:52

## 2019-06-09 RX ADMIN — METHYLPREDNISOLONE SODIUM SUCCINATE 125 MG: 125 INJECTION, POWDER, FOR SOLUTION INTRAMUSCULAR; INTRAVENOUS at 12:00

## 2019-06-09 RX ADMIN — BUDESONIDE 250 MCG: 0.25 SUSPENSION RESPIRATORY (INHALATION) at 22:06

## 2019-06-09 RX ADMIN — Medication 10 ML: at 19:55

## 2019-06-09 RX ADMIN — Medication 2000 MG: at 12:34

## 2019-06-09 RX ADMIN — IPRATROPIUM BROMIDE AND ALBUTEROL SULFATE 1 AMPULE: .5; 3 SOLUTION RESPIRATORY (INHALATION) at 16:48

## 2019-06-09 RX ADMIN — IPRATROPIUM BROMIDE AND ALBUTEROL SULFATE 1 AMPULE: .5; 3 SOLUTION RESPIRATORY (INHALATION) at 22:05

## 2019-06-09 RX ADMIN — IOPAMIDOL 90 ML: 755 INJECTION, SOLUTION INTRAVENOUS at 16:13

## 2019-06-09 ASSESSMENT — ENCOUNTER SYMPTOMS
ABDOMINAL DISTENTION: 0
EYE PAIN: 0
SINUS PRESSURE: 0
DIARRHEA: 0
PHOTOPHOBIA: 0
WHEEZING: 1
BACK PAIN: 0
EYE REDNESS: 0
BLOOD IN STOOL: 0
RHINORRHEA: 0
TROUBLE SWALLOWING: 0
NAUSEA: 0
VOMITING: 0
CONSTIPATION: 0
COUGH: 1
SORE THROAT: 0
CHEST TIGHTNESS: 0
ABDOMINAL PAIN: 0
SHORTNESS OF BREATH: 1

## 2019-06-09 ASSESSMENT — PAIN DESCRIPTION - ORIENTATION: ORIENTATION: MID;RIGHT;LEFT

## 2019-06-09 ASSESSMENT — PAIN SCALES - GENERAL
PAINLEVEL_OUTOF10: 4
PAINLEVEL_OUTOF10: 0
PAINLEVEL_OUTOF10: 5
PAINLEVEL_OUTOF10: 5
PAINLEVEL_OUTOF10: 8

## 2019-06-09 ASSESSMENT — PAIN DESCRIPTION - LOCATION
LOCATION: BACK
LOCATION: CHEST

## 2019-06-09 ASSESSMENT — PAIN DESCRIPTION - FREQUENCY
FREQUENCY: CONTINUOUS
FREQUENCY: INTERMITTENT

## 2019-06-09 ASSESSMENT — PAIN DESCRIPTION - DESCRIPTORS: DESCRIPTORS: ACHING;TIGHTNESS

## 2019-06-09 ASSESSMENT — PAIN DESCRIPTION - PAIN TYPE
TYPE: ACUTE PAIN
TYPE: ACUTE PAIN

## 2019-06-09 ASSESSMENT — PAIN DESCRIPTION - PROGRESSION: CLINICAL_PROGRESSION: GRADUALLY IMPROVING

## 2019-06-09 NOTE — ED PROVIDER NOTES
and EOM are normal. Right eye exhibits no discharge. Left eye exhibits no discharge. Neck: Normal range of motion. Neck supple. No thyromegaly present. Cardiovascular: Regular rhythm and normal heart sounds. Tachycardia present. Pulmonary/Chest: Accessory muscle usage present. Tachypnea noted. He is in respiratory distress. He has wheezes. He has rhonchi. He has no rales. He exhibits no tenderness. Abdominal: Soft. He exhibits no distension and no mass. There is no tenderness. There is no rebound and no guarding. Musculoskeletal: Normal range of motion. He exhibits no tenderness. Neurological: He is alert and oriented to person, place, and time. Skin: Skin is warm and dry. No rash noted. He is not diaphoretic. 3x1 cm healing wound over area of previous lumbar back surgery. No active discharge or bleeding noted on exam.       Procedures    MDM  Number of Diagnoses or Management Options  Acute and chronic respiratory failure with hypoxia ([address]):   HCAP (healthcare-associated pneumonia):   Septicemia [facility]):   Diagnosis management comments: Patient is a 59-year-old male who presented to ED for evaluation of shortness of breath, worsening productive cough. On arrival to ED, patient tachycardic--135, febrile-- 101.1°F, hypoxic on room air--92% SPO2 on 6L NC. Patient was administered breathing treatments and sepsis protocol ordered. Wound located to the lumbar region cultured. Chest x-ray with left upper lobe pneumonia resolved during interim, bilateral lower lobe infiltrates. Labs reviewed-- leukocytosis 17.5k, no lactic acidosis. Decision was made to admit patient for further evaluation and management. Patient with no new complaints prior to admission and is agreeable to plan.  Repeat exam prior to admission with resolved fever, pulse 95.      --------------------------------------------- PAST HISTORY ---------------------------------------------  Past Medical History:  has a past medical history of Acute and chronic respiratory failure with hypoxia (HCC), Acute respiratory failure with hypoxia ([facility] [address] Bullous emphysema ([facility] [address] Colon cancer screening, COPD (chronic obstructive pulmonary disease) ([facility] [address] Cough with hemoptysis, Left upper lobe pneumonia ([facility] [address] Pulmonary emphysema with fibrosis of lung ([facility] [address] Right lower lobe pneumonia ([facility] [address] S/P lumbar fusion, and Thoracic ascending aortic aneurysm ([facility] [address] Past Surgical History:  has a past surgical history that includes Abscess Drainage (2006); Colonoscopy (11/18/2013); and lumbar fusion (03/2019). Social History:  reports that he quit smoking about 4 years ago. His smoking use included cigarettes. He started smoking about 50 years ago. He has a 92.00 pack-year smoking history. He has never used smokeless tobacco. He reports that he does not drink alcohol or use drugs. Family History: family history includes Other in his father and mother. The patients home medications have been reviewed. Allergies: Patient has no known allergies.     -------------------------------------------------- RESULTS -------------------------------------------------    LABS:  Results for orders placed or performed during the hospital encounter of 06/09/19   CBC auto differential   Result Value Ref Range    WBC 17.5 (H) 4.5 - 11.5 E9/L    RBC 4.65 3.80 - 5.80 E12/L    Hemoglobin 14.1 12.5 - 16.5 g/dL    Hematocrit 40.8 37.0 - 54.0 %    MCV 87.7 80.0 - 99.9 fL    MCH 30.3 26.0 - 35.0 pg    MCHC 34.6 (H) 32.0 - 34.5 %    RDW 14.8 11.5 - 15.0 fL    Platelets 676 169 - 375 E9/L    MPV 9.5 7.0 - 12.0 fL    Neutrophils % 90.1 (H) 43.0 - 80.0 %    Immature Granulocytes % 0.7 0.0 - 5.0 %    Lymphocytes % 3.5 (L) 20.0 - 42.0 %    Monocytes % 5.4 2.0 - 12.0 %    Eosinophils % 0.2 0.0 - 6.0 %    Basophils % 0.1 0.0 - 2.0 %    Neutrophils # 15.77 (H) 1.80 - 7.30 E9/L    Immature Granulocytes # 0.12 E9/L    Lymphocytes # 0.61 (L) 1.50 - 4.00 E9/L    Monocytes # 0.94 0.10 - 0.95 E9/L

## 2019-06-10 PROBLEM — B34.8 INFECTION DUE TO PARAINFLUENZA VIRUS 3: Status: ACTIVE | Noted: 2019-06-10

## 2019-06-10 PROBLEM — E83.39 HYPOPHOSPHATEMIA: Status: ACTIVE | Noted: 2019-06-10

## 2019-06-10 PROBLEM — J96.20 RESPIRATORY FAILURE, ACUTE-ON-CHRONIC (HCC): Status: RESOLVED | Noted: 2019-06-09 | Resolved: 2019-06-10

## 2019-06-10 PROBLEM — E74.39 GLUCOSE INTOLERANCE: Status: ACTIVE | Noted: 2019-06-10

## 2019-06-10 PROBLEM — R59.0 HILAR ADENOPATHY: Status: ACTIVE | Noted: 2019-06-10

## 2019-06-10 PROBLEM — J96.01 ACUTE RESPIRATORY FAILURE WITH HYPOXIA (HCC): Status: RESOLVED | Noted: 2018-11-12 | Resolved: 2019-06-10

## 2019-06-10 LAB
ANION GAP SERPL CALCULATED.3IONS-SCNC: 12 MMOL/L (ref 7–16)
ANISOCYTOSIS: ABNORMAL
BASOPHILS ABSOLUTE: 0.01 E9/L (ref 0–0.2)
BASOPHILS RELATIVE PERCENT: 0.1 % (ref 0–2)
BUN BLDV-MCNC: 7 MG/DL (ref 8–23)
CALCIUM SERPL-MCNC: 8.9 MG/DL (ref 8.6–10.2)
CHLORIDE BLD-SCNC: 105 MMOL/L (ref 98–107)
CHOLESTEROL, TOTAL: 111 MG/DL (ref 0–199)
CO2: 21 MMOL/L (ref 22–29)
CREAT SERPL-MCNC: 0.5 MG/DL (ref 0.7–1.2)
EOSINOPHILS ABSOLUTE: 0 E9/L (ref 0.05–0.5)
EOSINOPHILS RELATIVE PERCENT: 0 % (ref 0–6)
FILM ARRAY ADENOVIRUS: ABNORMAL
FILM ARRAY BORDETELLA PERTUSSIS: ABNORMAL
FILM ARRAY CHLAMYDOPHILIA PNEUMONIAE: ABNORMAL
FILM ARRAY CORONAVIRUS 229E: ABNORMAL
FILM ARRAY CORONAVIRUS HKU1: ABNORMAL
FILM ARRAY CORONAVIRUS NL63: ABNORMAL
FILM ARRAY CORONAVIRUS OC43: ABNORMAL
FILM ARRAY INFLUENZA A VIRUS 09H1: ABNORMAL
FILM ARRAY INFLUENZA A VIRUS H1: ABNORMAL
FILM ARRAY INFLUENZA A VIRUS H3: ABNORMAL
FILM ARRAY INFLUENZA A VIRUS: ABNORMAL
FILM ARRAY INFLUENZA B: ABNORMAL
FILM ARRAY METAPNEUMOVIRUS: ABNORMAL
FILM ARRAY MYCOPLASMA PNEUMONIAE: ABNORMAL
FILM ARRAY PARAINFLUENZA VIRUS 1: ABNORMAL
FILM ARRAY PARAINFLUENZA VIRUS 2: ABNORMAL
FILM ARRAY PARAINFLUENZA VIRUS 4: ABNORMAL
FILM ARRAY RESPIRATORY SYNCITIAL VIRUS: ABNORMAL
FILM ARRAY RHINOVIRUS/ENTEROVIRUS: ABNORMAL
GFR AFRICAN AMERICAN: >60
GFR NON-AFRICAN AMERICAN: >60 ML/MIN/1.73
GLUCOSE BLD-MCNC: 231 MG/DL (ref 74–99)
HCT VFR BLD CALC: 36.8 % (ref 37–54)
HDLC SERPL-MCNC: 44 MG/DL
HEMOGLOBIN: 12.3 G/DL (ref 12.5–16.5)
IMMATURE GRANULOCYTES #: 0.06 E9/L
IMMATURE GRANULOCYTES %: 0.8 % (ref 0–5)
L. PNEUMOPHILA SEROGP 1 UR AG: NORMAL
LDL CHOLESTEROL CALCULATED: 60 MG/DL (ref 0–99)
LYMPHOCYTES ABSOLUTE: 0.28 E9/L (ref 1.5–4)
LYMPHOCYTES RELATIVE PERCENT: 3.6 % (ref 20–42)
MAGNESIUM: 2.1 MG/DL (ref 1.6–2.6)
MCH RBC QN AUTO: 29.6 PG (ref 26–35)
MCHC RBC AUTO-ENTMCNC: 33.4 % (ref 32–34.5)
MCV RBC AUTO: 88.5 FL (ref 80–99.9)
METER GLUCOSE: 130 MG/DL (ref 74–99)
METER GLUCOSE: 162 MG/DL (ref 74–99)
METER GLUCOSE: 211 MG/DL (ref 74–99)
MONOCYTES ABSOLUTE: 0.09 E9/L (ref 0.1–0.95)
MONOCYTES RELATIVE PERCENT: 1.1 % (ref 2–12)
NEUTROPHILS ABSOLUTE: 7.43 E9/L (ref 1.8–7.3)
NEUTROPHILS RELATIVE PERCENT: 94.4 % (ref 43–80)
ORGANISM: ABNORMAL
PDW BLD-RTO: 14.7 FL (ref 11.5–15)
PHOSPHORUS: 2.4 MG/DL (ref 2.5–4.5)
PLATELET # BLD: 233 E9/L (ref 130–450)
PMV BLD AUTO: 9.2 FL (ref 7–12)
POTASSIUM SERPL-SCNC: 3.7 MMOL/L (ref 3.5–5)
PRO-BNP: 200 PG/ML (ref 0–125)
RBC # BLD: 4.16 E12/L (ref 3.8–5.8)
SODIUM BLD-SCNC: 138 MMOL/L (ref 132–146)
STREP PNEUMONIAE ANTIGEN, URINE: NORMAL
TRIGL SERPL-MCNC: 35 MG/DL (ref 0–149)
TSH SERPL DL<=0.05 MIU/L-ACNC: 0.49 UIU/ML (ref 0.27–4.2)
VLDLC SERPL CALC-MCNC: 7 MG/DL
WBC # BLD: 7.9 E9/L (ref 4.5–11.5)

## 2019-06-10 PROCEDURE — 6370000000 HC RX 637 (ALT 250 FOR IP): Performed by: SPECIALIST

## 2019-06-10 PROCEDURE — 84100 ASSAY OF PHOSPHORUS: CPT

## 2019-06-10 PROCEDURE — 36415 COLL VENOUS BLD VENIPUNCTURE: CPT

## 2019-06-10 PROCEDURE — 6370000000 HC RX 637 (ALT 250 FOR IP): Performed by: INTERNAL MEDICINE

## 2019-06-10 PROCEDURE — 6360000002 HC RX W HCPCS: Performed by: INTERNAL MEDICINE

## 2019-06-10 PROCEDURE — 80048 BASIC METABOLIC PNL TOTAL CA: CPT

## 2019-06-10 PROCEDURE — 87070 CULTURE OTHR SPECIMN AEROBIC: CPT

## 2019-06-10 PROCEDURE — 2700000000 HC OXYGEN THERAPY PER DAY

## 2019-06-10 PROCEDURE — 94640 AIRWAY INHALATION TREATMENT: CPT

## 2019-06-10 PROCEDURE — 83735 ASSAY OF MAGNESIUM: CPT

## 2019-06-10 PROCEDURE — 97165 OT EVAL LOW COMPLEX 30 MIN: CPT

## 2019-06-10 PROCEDURE — 84443 ASSAY THYROID STIM HORMONE: CPT

## 2019-06-10 PROCEDURE — 2580000003 HC RX 258

## 2019-06-10 PROCEDURE — 85025 COMPLETE CBC W/AUTO DIFF WBC: CPT

## 2019-06-10 PROCEDURE — 2580000003 HC RX 258: Performed by: INTERNAL MEDICINE

## 2019-06-10 PROCEDURE — 87205 SMEAR GRAM STAIN: CPT

## 2019-06-10 PROCEDURE — 97161 PT EVAL LOW COMPLEX 20 MIN: CPT

## 2019-06-10 PROCEDURE — 83880 ASSAY OF NATRIURETIC PEPTIDE: CPT

## 2019-06-10 PROCEDURE — 87206 SMEAR FLUORESCENT/ACID STAI: CPT

## 2019-06-10 PROCEDURE — 80061 LIPID PANEL: CPT

## 2019-06-10 PROCEDURE — 82962 GLUCOSE BLOOD TEST: CPT

## 2019-06-10 PROCEDURE — 2060000000 HC ICU INTERMEDIATE R&B

## 2019-06-10 PROCEDURE — 2500000003 HC RX 250 WO HCPCS: Performed by: INTERNAL MEDICINE

## 2019-06-10 RX ORDER — DEXTROSE MONOHYDRATE 50 MG/ML
100 INJECTION, SOLUTION INTRAVENOUS PRN
Status: DISCONTINUED | OUTPATIENT
Start: 2019-06-10 | End: 2019-06-14 | Stop reason: HOSPADM

## 2019-06-10 RX ORDER — BENZONATATE 100 MG/1
200 CAPSULE ORAL 3 TIMES DAILY
Status: DISCONTINUED | OUTPATIENT
Start: 2019-06-10 | End: 2019-06-14 | Stop reason: HOSPADM

## 2019-06-10 RX ORDER — NICOTINE POLACRILEX 4 MG
15 LOZENGE BUCCAL PRN
Status: DISCONTINUED | OUTPATIENT
Start: 2019-06-10 | End: 2019-06-14 | Stop reason: HOSPADM

## 2019-06-10 RX ORDER — DOXYCYCLINE HYCLATE 100 MG/1
100 CAPSULE ORAL EVERY 12 HOURS SCHEDULED
Status: DISCONTINUED | OUTPATIENT
Start: 2019-06-10 | End: 2019-06-14 | Stop reason: HOSPADM

## 2019-06-10 RX ORDER — DEXTROSE MONOHYDRATE 25 G/50ML
12.5 INJECTION, SOLUTION INTRAVENOUS PRN
Status: DISCONTINUED | OUTPATIENT
Start: 2019-06-10 | End: 2019-06-14 | Stop reason: HOSPADM

## 2019-06-10 RX ADMIN — GUAIFENESIN AND CODEINE PHOSPHATE 5 ML: 10; 100 LIQUID ORAL at 22:29

## 2019-06-10 RX ADMIN — SODIUM CHLORIDE: 9 INJECTION, SOLUTION INTRAVENOUS at 19:58

## 2019-06-10 RX ADMIN — METHYLPREDNISOLONE SODIUM SUCCINATE 80 MG: 125 INJECTION, POWDER, LYOPHILIZED, FOR SOLUTION INTRAMUSCULAR; INTRAVENOUS at 03:19

## 2019-06-10 RX ADMIN — IPRATROPIUM BROMIDE AND ALBUTEROL SULFATE 1 AMPULE: .5; 3 SOLUTION RESPIRATORY (INHALATION) at 20:28

## 2019-06-10 RX ADMIN — Medication 10 ML: at 19:58

## 2019-06-10 RX ADMIN — BENZONATATE 100 MG: 100 CAPSULE ORAL at 09:48

## 2019-06-10 RX ADMIN — INSULIN LISPRO 2 UNITS: 100 INJECTION, SOLUTION INTRAVENOUS; SUBCUTANEOUS at 17:09

## 2019-06-10 RX ADMIN — BUDESONIDE 250 MCG: 0.25 SUSPENSION RESPIRATORY (INHALATION) at 20:29

## 2019-06-10 RX ADMIN — GUAIFENESIN AND CODEINE PHOSPHATE 5 ML: 10; 100 LIQUID ORAL at 12:41

## 2019-06-10 RX ADMIN — FORMOTEROL FUMARATE DIHYDRATE 20 MCG: 20 SOLUTION RESPIRATORY (INHALATION) at 09:34

## 2019-06-10 RX ADMIN — WATER 10 ML: 1 INJECTION INTRAMUSCULAR; INTRAVENOUS; SUBCUTANEOUS at 12:42

## 2019-06-10 RX ADMIN — BENZONATATE 200 MG: 100 CAPSULE ORAL at 14:59

## 2019-06-10 RX ADMIN — IPRATROPIUM BROMIDE AND ALBUTEROL SULFATE 1 AMPULE: .5; 3 SOLUTION RESPIRATORY (INHALATION) at 12:32

## 2019-06-10 RX ADMIN — SODIUM CHLORIDE: 9 INJECTION, SOLUTION INTRAVENOUS at 09:49

## 2019-06-10 RX ADMIN — BUDESONIDE 250 MCG: 0.25 SUSPENSION RESPIRATORY (INHALATION) at 09:34

## 2019-06-10 RX ADMIN — IBUPROFEN 200 MG: 200 TABLET, FILM COATED ORAL at 12:41

## 2019-06-10 RX ADMIN — METHYLPREDNISOLONE SODIUM SUCCINATE 80 MG: 125 INJECTION, POWDER, LYOPHILIZED, FOR SOLUTION INTRAMUSCULAR; INTRAVENOUS at 19:58

## 2019-06-10 RX ADMIN — PANTOPRAZOLE SODIUM 40 MG: 40 TABLET, DELAYED RELEASE ORAL at 05:32

## 2019-06-10 RX ADMIN — INSULIN LISPRO 1 UNITS: 100 INJECTION, SOLUTION INTRAVENOUS; SUBCUTANEOUS at 19:59

## 2019-06-10 RX ADMIN — DOXYCYCLINE HYCLATE 100 MG: 100 CAPSULE ORAL at 19:59

## 2019-06-10 RX ADMIN — IPRATROPIUM BROMIDE AND ALBUTEROL SULFATE 1 AMPULE: .5; 3 SOLUTION RESPIRATORY (INHALATION) at 15:37

## 2019-06-10 RX ADMIN — POTASSIUM PHOSPHATE, MONOBASIC AND POTASSIUM PHOSPHATE, DIBASIC 15 MMOL: 224; 236 INJECTION, SOLUTION, CONCENTRATE INTRAVENOUS at 11:10

## 2019-06-10 RX ADMIN — BENZONATATE 200 MG: 100 CAPSULE ORAL at 19:59

## 2019-06-10 RX ADMIN — IBUPROFEN 200 MG: 200 TABLET, FILM COATED ORAL at 09:48

## 2019-06-10 RX ADMIN — IPRATROPIUM BROMIDE AND ALBUTEROL SULFATE 1 AMPULE: .5; 3 SOLUTION RESPIRATORY (INHALATION) at 09:34

## 2019-06-10 RX ADMIN — FORMOTEROL FUMARATE DIHYDRATE 20 MCG: 20 SOLUTION RESPIRATORY (INHALATION) at 20:28

## 2019-06-10 RX ADMIN — Medication 10 ML: at 09:49

## 2019-06-10 RX ADMIN — IBUPROFEN 200 MG: 200 TABLET, FILM COATED ORAL at 17:08

## 2019-06-10 RX ADMIN — METHYLPREDNISOLONE SODIUM SUCCINATE 80 MG: 125 INJECTION, POWDER, LYOPHILIZED, FOR SOLUTION INTRAMUSCULAR; INTRAVENOUS at 12:42

## 2019-06-10 RX ADMIN — GUAIFENESIN AND CODEINE PHOSPHATE 5 ML: 10; 100 LIQUID ORAL at 17:08

## 2019-06-10 ASSESSMENT — PAIN SCALES - GENERAL
PAINLEVEL_OUTOF10: 0

## 2019-06-10 NOTE — PROGRESS NOTES
P/C to pulmonary associates answering service to make sure consult was called. They confirmed that it was called in yesterday around 3. Answering service to resend message to Dr. Katlyn Castro regarding consult.   Electronically signed by Diane Lennox, RN on 6/10/2019 at 5:18 PM

## 2019-06-10 NOTE — PROGRESS NOTES
evaluation only no indicated need for further skilled OT services. Please reconsult if any new need arises. Pt demonstrates good understanding of ECT and pacing techniques in home environment.      Eval Complexity: Low     Upon arrival, patient supine in bed.  At end of session, patient supine in bed per pt request with call light and phone within reach, all lines and tubes intact.     Low Evaluation      Evaluation time includes thorough review of current medical information, gathering information on past medical history/social history and prior level of function, completion of standardized testing/informal observation of tasks and assessment of data.     Jeanmarie Espino OTR/L  [id number]

## 2019-06-10 NOTE — PROGRESS NOTES
Physical Therapy    Facility/Department: [facility] SURG  Initial Assessment    NAME: Anahy Buchanan  : 1950  MRN: [id number]    Date of Service: 6/10/2019       REQUIRES PT FOLLOW UP: No       Patient Diagnosis(es): The primary encounter diagnosis was HCAP (healthcare-associated pneumonia). Diagnoses of Septicemia ([address] and Acute and chronic respiratory failure with hypoxia ([address] were also pertinent to this visit. has a past medical history of Acute and chronic respiratory failure with hypoxia ([address] Acute respiratory failure with hypoxia ([address] Bullous emphysema ([address] Colon cancer screening, COPD (chronic obstructive pulmonary disease) ([address] Cough with hemoptysis, Glucose intolerance, Hilar adenopathy, Hypophosphatemia, Infection due to parainfluenza virus 3, Left upper lobe pneumonia ([address] Pulmonary emphysema with fibrosis of lung ([address] Right lower lobe pneumonia ([address] S/P lumbar fusion, and Thoracic ascending aortic aneurysm ([address] has a past surgical history that includes Abscess Drainage (); Colonoscopy (2013); and lumbar fusion (2019). Evaluating Therapist: Tone Villa PT         Room #:  289  DIAGNOSIS:  sepsis   Additional Pertinent History: recent back surgery  - 3 weeks, no brace   PRECAUTIONS: falls, O2 @ 6 LNC, contact isolation      Social:  Pt lives with  Wife  in a  1 floor plan  1+1  steps and  No  rails to enter. Prior to admission pt walked with  No AD. Has ww        Initial Evaluation  Date:  6/10/19   Was pt agreeable to Eval/treatment? yes    Does pt have pain?  none reported    Bed Mobility  Rolling: independent   Supine to sit:   Independent   Sit to supine:  NT   Scooting:  Independent    Transfers Sit to stand:  independent   Stand to sit:  independent   Stand pivot:  independent    Ambulation    50   feet with  No AD  with  independent          Stair negotiation: ascended and descended NT , pt in isolation    LE ROM  WFL    LE strength  4 to 4+/ 5 AM- PAC RAW score  24/ 24             Pt is alert and Oriented x 3       Balance:  independent   Endurance:  Decreased   Chair alarm:  No      ASSESSMENT    Pt displays functional ability as noted in the objective portion of this evaluation.       Comments/Treatment:  Pt left up in chair with call light in reach             Patient education  Pt educated on  Fall risk, proper breathing techniques      Patient response to education:   Pt verbalized understanding Pt demonstrated skill Pt requires further education in this area   x                Pts/ family goals     1. breathe better      Patient and or family understand(s) diagnosis, prognosis, and plan of care. -  Yes      PLAN    No skilled PT recommended.  Pt  may benefit from pulmonary rehab     Time in: 0827   Time out:  Prasanth Brandt number:  PT [id number]

## 2019-06-10 NOTE — CONSULTS
Inpatient consult to Infectious Diseases  Consult performed by: Jose Eller MD  Consult ordered by: oTmmy Oscar DO        [address] [place] [address] [facility]  [address] [place]  Consultation Note     Admit Date: 6/9/2019 11:17 AM    Reason for Consult:  Sepsis? ? Attending Physician:  Tommy Oscar DO    HISTORY OF PRESENT ILLNESS:             The history is obtained from extensive review of available past medical records. The patient is a 79 y.o. male who was admitted to [facility] between 4/22/19 and 4/26/19 with pneumonia. Seen by pulmonary. He was discharged on Levofloxacin and a prednisone taper. He was seen in follow-up by pulmonary in the office. He was breathing much better. The patient came back to the ED on 5/28/19 with shortness of breath and chest pain. The patient was discharged home with a diagnosis of chest pain and shortness of breath. The patient came back again to the ED on 5/30/19 with shortness of breath and a low-grade fever of 101°F. He also had some chills at home. His white count was normal and he was afebrile. The patient was treated with Cefepime and Vancomycin. The patient was seen in consultation by pulmonary. Was thought to have a left upper lobe pneumonia. The patient has had lumbar fusion at [facility] on 4/1/19. He was noted to have an open wound with some necrotic tissue. When he had seen his surgeon he was told that he may need to have debridement of the necrotic tissue. He was seen by ID and treated for a left upper lobe healthcare associated pneumonia with Vancomycin and Cefepime. He was also noted to have a necrotic wound from a previous lumbar surgeries and only required enzymatic debridement. It was colonized with MSSA but not infected. The patient improved significantly the antibiotics were changed over to Levofloxacin and he was discharged on 6/3/19. The patient comes back to the ED on 6/9/19.  The patient clearly that he was not feeling well since she was discharged. He was having some cough, myalgias and in the ED had a temperature 101.1°F and was tachycardic. Respiratory panel has now tested positive for parainfluenza 3 virus. His wife has similar symptoms.     Past Medical History:        Diagnosis Date    Acute and chronic respiratory failure with hypoxia ([address] 10/12/2017    Acute respiratory failure with hypoxia (HCC) 11/12/2018    Bullous emphysema ([address] 11/12/2018    Colon cancer screening     COPD (chronic obstructive pulmonary disease) ([facility])     Cough with hemoptysis 4/23/2019    Glucose intolerance 6/10/2019    Hilar adenopathy 6/10/2019    Hypophosphatemia 6/10/2019    Infection due to parainfluenza virus 3 6/10/2019    Left upper lobe pneumonia ([address] 10/12/2017    Pulmonary emphysema with fibrosis of lung ([address] 11/15/2018    Right lower lobe pneumonia ([address] 11/15/2018    Recurrent 4/23/19    S/P lumbar fusion 6/1/2019    Thoracic ascending aortic aneurysm (HCC) 11/15/2018    Proximal ascending aorta; 3.8 cm; 11/15/18     Past Surgical History:        Procedure Laterality Date    ABSCESS DRAINAGE  2006    X2 RECTAL ABSCESS    COLONOSCOPY  11/18/2013    LUMBAR FUSION  03/2019    [facility]     Current Medications:   Scheduled Meds:   potassium phosphate IVPB  15 mmol Intravenous Once    insulin lispro  0-6 Units Subcutaneous TID WC    insulin lispro  0-3 Units Subcutaneous Nightly    benzonatate  200 mg Oral TID    ibuprofen  200 mg Oral TID WC    budesonide  250 mcg Nebulization BID    formoterol  20 mcg Nebulization BID    methylPREDNISolone  80 mg Intravenous Q8H    ipratropium-albuterol  1 ampule Inhalation Q4H WA    pantoprazole  40 mg Oral QAM AC    sodium chloride flush  10 mL Intravenous BID     Continuous Infusions:   dextrose      sodium chloride 100 mL/hr at 06/10/19 0949     PRN Meds:glucose, dextrose, glucagon (rDNA), dextrose, guaiFENesin-codeine, ondansetron, acetaminophen, sodium chloride flush    Allergies:  Patient has no known allergies. Social History:   Social History     Socioeconomic History    Marital status:      Spouse name: None    Number of children: None    Years of education: None    Highest education level: None   Occupational History    None   Social Needs    Financial resource strain: None    Food insecurity:     Worry: None     Inability: None    Transportation needs:     Medical: None     Non-medical: None   Tobacco Use    Smoking status: Former Smoker     Packs/day: 2.00     Years: 46.00     Pack years: 92.00     Types: Cigarettes     Start date: 10/12/1968     Last attempt to quit: 10/12/2014     Years since quittin.6    Smokeless tobacco: Never Used   Substance and Sexual Activity    Alcohol use: No    Drug use: No    Sexual activity: Not Currently     Partners: Female   Lifestyle    Physical activity:     Days per week: None     Minutes per session: None    Stress: None   Relationships    Social connections:     Talks on phone: None     Gets together: None     Attends Rastafarian service: None     Active member of club or organization: None     Attends meetings of clubs or organizations: None     Relationship status: None    Intimate partner violence:     Fear of current or ex partner: None     Emotionally abused: None     Physically abused: None     Forced sexual activity: None   Other Topics Concern    None   Social History Narrative    None     Pets: 2 dogs and a parrot  Travel: None  Patient works as a     Family History:       Problem Relation Age of Onset    Other Mother          age 80    Other Father          age 80   . Otherwise non-pertinent to the chief complaint.     REVIEW OF SYSTEMS:    Constitutional: As in HPI   Neurologic: Negative   Psychiatric: Negative  Rheumatologic: Negative   Endocrine: Negative  Hematologic: Negative  Immunologic: Negative   ENT: Negative  Respiratory:As needed when necessary Cardiovascular: Negative  GI: Negative  : Negative  Musculoskeletal: Negative  Skin: No rashes. PHYSICAL EXAM:    Vitals:   /68   Pulse 105   Temp 96.5 °F (35.8 °C) (Oral)   Resp 18   Ht 6' 1\" (1.854 m)   Wt 209 lb 6.4 oz (95 kg)   SpO2 93%   BMI 27.63 kg/m²   Constitutional: The patient is awake, alert, and oriented. Skin: Warm and dry. No rashes were noted. HEENT: Eyes show round, and reactive pupils. No jaundice. Moist mucous membranes, no ulcerations, no thrush. Neck: Supple to movements. No lymphadenopathy. Chest: No use of accessory muscles to breathe. Symmetrical expansion. Auscultation reveals no wheezing, crackles, or rhonchi. Cardiovascular: S1 and S2 are rhythmic and regular. No murmurs appreciated. Abdomen: Positive bowel sounds to auscultation. Benign to palpation. No masses felt. No hepatosplenomegaly. Extremities: No clubbing, no cyanosis, no edema. Lines: peripheral  Back: Surgical lumbar wound without-car.  Healing slowly    CBC+dif:  Recent Labs     06/09/19  1139 06/10/19  0320   WBC 17.5* 7.9   HGB 14.1 12.3*   HCT 40.8 36.8*   MCV 87.7 88.5    233   NEUTROABS 15.77* 7.43*     Lab Results   Component Value Date    CRP 5.7 (H) 06/09/2019    CRP 4.6 (H) 06/01/2019    CRP 12.2 (H) 05/30/2019      No results found for: CRP  Lab Results   Component Value Date    SEDRATE 32 (H) 06/09/2019    SEDRATE 29 (H) 06/01/2019    SEDRATE 42 (H) 05/30/2019     Lab Results   Component Value Date    ALT 7 05/30/2019    AST 12 05/30/2019    ALKPHOS 73 05/30/2019    BILITOT 0.9 05/30/2019     Lab Results   Component Value Date     06/10/2019    K 3.7 06/10/2019    K 4.4 04/22/2019     06/10/2019    CO2 21 06/10/2019    BUN 7 06/10/2019    CREATININE 0.5 06/10/2019    GFRAA >60 06/10/2019    LABGLOM >60 06/10/2019    GLUCOSE 231 06/10/2019    PROT 6.5 05/30/2019    LABALBU 3.6 05/30/2019    CALCIUM 8.9 06/10/2019    BILITOT 0.9 05/30/2019    ALKPHOS 73 05/30/2019 AST 12 05/30/2019    ALT 7 05/30/2019       Lab Results   Component Value Date    PROTIME 12.5 05/30/2019    INR 1.1 05/30/2019       Lab Results   Component Value Date    TSH 0.489 06/10/2019       Lab Results   Component Value Date    COLORU Yellow 06/09/2019    PHUR 6.0 06/09/2019    LABCAST MANY 05/30/2019    WBCUA NONE 06/09/2019    RBCUA NONE 06/09/2019    MUCUS Present 04/22/2019    BACTERIA NONE 06/09/2019    CLARITYU Clear 06/09/2019    SPECGRAV 1.010 06/09/2019    LEUKOCYTESUR Negative 06/09/2019    UROBILINOGEN 0.2 06/09/2019    BILIRUBINUR LARGE 06/09/2019    BLOODU Negative 06/09/2019    GLUCOSEU Negative 06/09/2019       Lab Results   Component Value Date    HCO3 21.3 05/28/2019    BE -1.8 05/28/2019    O2SAT 91.6 05/28/2019    PH 7.440 05/28/2019    PCO2 32.1 05/28/2019    PO2 59.1 05/28/2019     Radiology:  Noted    Microbiology:  Pending  No results for input(s): BC in the last 72 hours. Recent Labs     06/09/19  1745   ORG FILM ARR ParaInfluenza 3 Virus Detected*     No results for input(s): Ronmasha Peat in the last 72 hours. Recent Labs     06/09/19  1745   STREPNEUMAGU Presumptive NEGATIVE for Pneumococcal pneumonia, suggesting  no current or recent pneumococcal infection. Infection due  to S. pneumoniae cannot be ruled out since the antigen present  in the sample may be below the detection limit of the test.       Recent Labs     06/09/19  1745   LP1UAG Presumptive NEGATIVE suggesting no recent or current infections  with Legionella pneumophilia serogroup 1. Infection to  Legionella cannot be ruled out since other serogroups and  species may cause infection, antigen may not be present in  early infection, or level of antigen may be below the  detection limit. No results for input(s): ASO in the last 72 hours. No results for input(s): CULTRESP in the last 72 hours. Recent Labs     06/09/19  1515   PROCAL 0.10*       Assessment:  · Healthcare associated pneumonia versus mass.  Not much

## 2019-06-10 NOTE — PROGRESS NOTES
[facility] Quality Flow/Interdisciplinary Rounds Progress Note        Quality Flow Rounds held on Aislinn 10, 2019    Disciplines Attending:  Bedside Nurse, ,  and Nursing Unit Leadership    Jeancarlos Frazier was admitted on 6/9/2019 11:17 AM    Anticipated Discharge Date:  Expected Discharge Date: 06/28/19    Disposition:    Sandro Score:  Sandro Scale Score: 22    Readmission Risk              Risk of Unplanned Readmission:        13           Discussed patient goal for the day, patient clinical progression, and barriers to discharge.   The following Goal(s) of the Day/Commitment(s) have been identified:  iv steriods      Otero Lennox  Aislinn 10, 2019

## 2019-06-10 NOTE — CARE COORDINATION
BPCI-A PROGRAM READMIT QUALIFYING  from 19    [address] Transitions Interview     6/10/2019    Patient: Luis Fernando Cedillo Patient : 1950   MRN: [id number]  Reason for Admission: Sepsis, pneumonia  RARS: Readmission Risk Score: 14    Readmission Risk  Patient Active Problem List   Diagnosis    Left upper lobe pneumonia ([address]    COPD (chronic obstructive pulmonary disease) ([address]    Acute and chronic respiratory failure with hypoxia ([address]    Closed displaced fracture of second metatarsal bone of right foot with routine healing    COPD exacerbation ([address]    Bullous emphysema ([address]    Right lower lobe pneumonia ([address]    Pulmonary emphysema with fibrosis of lung ([address]    Thoracic ascending aortic aneurysm ([address]    Pneumonia    Cough with hemoptysis    Pneumonia, bacterial    S/P lumbar fusion    Sepsis ([address]    Infection due to parainfluenza virus 3    Hypophosphatemia    Glucose intolerance    Hilar adenopathy       Inpatient Assessment  Care Transitions Summary    Care Transitions Inpatient Review  Medication Review  Do you have all of your prescriptions and are they filled?:  Yes   Housing Review  Social Support  Durable Medical Equipment  Functional Review  Hearing and Vision  Care Transitions Interventions     Attempted to meet with Fariba Claudine for [facility] inpatient interview, Fariba Chow is presently off the floor for testing. BPCI-A letter mailed on 19. Follow Up  Future Appointments   Date Time Provider Tan Miranda   2019  2:30 PM MIGUE Osullivan - CNP [facility]   2019  3:00 PM Rohini Grant APRN - CNP [facility]       Health Maintenance  There are no preventive care reminders to display for this patient.     Chana Ordoñez, RN

## 2019-06-10 NOTE — CARE COORDINATION
Social work / Discharge Planning:        Patient is a readmission and known to social work. He lives with his wife and has a ww. Patient has home 02 (3.5 liters). Social work will follow with CM to assist with any discharge needs.   Electronically signed by MALVIN Simms on 6/10/2019 at 9:40 AM

## 2019-06-10 NOTE — CONSULTS
Pulmonary Consultation    Admit Date: 6/9/2019  Requesting Physician: Jerrica Oliver MD    CC:  · Bronchospasm     HPI:    · Yash Wilkes is a 71-year-old white male with gold class IV chronic obstructive pulmonary disease who over the past 3 months has been now admitted 3 times. During the first admission, the patient was seen for a routine exacerbation of chronic obstructive pulmonary disease. He did well. · His CT scans are seen below. During his second admission, the patient was noted to have a lingular pneumonia. Treated by infectious disease with cefepime and vancomycin, the patient did pretty well and was seen in follow-up as an outpatient. That time, he is breathing pretty well. · Patient now presents with shortness of breath again. A viral panel was positive for parainfluenza 3. Still another CAT scan was done. This time, still again, the radiologist suggests there is a dense infiltrate in the left lung may be malignancy. This malignancy would have had to have grown extremely rapidly over the course of 2 months as seen below when in April was not present.     PMH:    Past Medical History:   Diagnosis Date    Acute and chronic respiratory failure with hypoxia ([address] 10/12/2017    Acute respiratory failure with hypoxia ([address] 11/12/2018    Bullous emphysema ([address] 11/12/2018    Colon cancer screening     COPD (chronic obstructive pulmonary disease) (HCC)     Cough with hemoptysis 4/23/2019    Glucose intolerance 6/10/2019    Hilar adenopathy 6/10/2019    Hypophosphatemia 6/10/2019    Infection due to parainfluenza virus 3 6/10/2019    Left upper lobe pneumonia ([address] 10/12/2017    Pulmonary emphysema with fibrosis of lung ([address] 11/15/2018    Right lower lobe pneumonia ([address] 11/15/2018    Recurrent 4/23/19    S/P lumbar fusion 6/1/2019    Thoracic ascending aortic aneurysm ([address] 11/15/2018    Proximal ascending aorta; 3.8 cm; 11/15/18     PSH:   Past Surgical History: Procedure Laterality Date    ABSCESS DRAINAGE  2006    X2 RECTAL ABSCESS    COLONOSCOPY  11/18/2013    LUMBAR FUSION  03/2019    [facility]       Review of Systems:   · Constitutional: As noted in the HPI. Denies fever or chills. · Eyes: No visual changes or diplopia. No scleral icterus. · ENT: No headaches, hearing loss or vertigo. No nasal congestion, or sore throat. · Cardiovascular: No chest pain, dyspnea on exertion, or palpitations. · Respiratory: See above  · Gastrointestinal: No abdominal pain, nausea or emesis. No diarrhea or rectal bleeding or melena. No change in bowel habits. · Genitourinary: No dysuria, urinary frequency, or incontinence. No hematuria. · Musculoskeletal: No gait disturbance, weakness or joint complaints. · Integumentary: No rash or pruritis. No abnormal pigmentation, hair or nail changes. · Neurological: No headache, diplopia, dizziness, tremor, change in muscle strength, numbness or tingling. No change in gait, balance, coordination, mood, affect, memory, mentation, behavior. · Psychiatric: No anxiety or depression. · Endocrine: No temperature intolerance, excessive thirst, fluid intake, urinary frequency, excessive appetite, or recent weight change. · Hematologic/Lymphatic: No abnormal bruising or bleeding, blood clots or swollen lymph nodes. No anemia, fever, chills, night sweats, or swollen glands. · Allergic/Immunologic: No seasonal or perenial allergies. No history of hives or atopic dermatitis.     Social History:  · Alcohol:  No  · Tobacco:   Past  · Employment:  no silica or asbestos exposure  · Family:  No family history of lung disease    Medications:   dextrose      sodium chloride 100 mL/hr at 06/10/19 0949      insulin lispro  0-6 Units Subcutaneous TID     insulin lispro  0-3 Units Subcutaneous Nightly    benzonatate  200 mg Oral TID    doxycycline hyclate  100 mg Oral 2 times per day    ibuprofen  200 mg Oral TID     budesonide  250 mcg Nebulization BID    formoterol  20 mcg Nebulization BID    methylPREDNISolone  80 mg Intravenous Q8H    ipratropium-albuterol  1 ampule Inhalation Q4H WA    pantoprazole  40 mg Oral QAM AC    sodium chloride flush  10 mL Intravenous BID       Vitals:  Tmax:  VITALS:  /67   Pulse 108   Temp 97.6 °F (36.4 °C) (Oral)   Resp 18   Ht 6' 1\" (1.854 m)   Wt 209 lb 6.4 oz (95 kg)   SpO2 94%   BMI 27.63 kg/m²   24HR INTAKE/OUTPUT:      Intake/Output Summary (Last 24 hours) at 6/10/2019 1739  Last data filed at 6/10/2019 1732  Gross per 24 hour   Intake 4420.7 ml   Output 150 ml   Net 4270.7 ml     CURRENT PULSE OXIMETRY:  SpO2: 94 %  24HR PULSE OXIMETRY RANGE:  SpO2  Av.7 %  Min: 93 %  Max: 94 %    EXAM:  General: No distress. Alert. Eyes: PERRL. No sclera icterus. No conjunctival injection. ENT: No discharge. Pharynx clear. Neck: Trachea midline. Normal thyroid. No jvd, no hjr. Resp: No wheezing. No accessory muscle use. No rales. No rhonchi. CV: Regular rate. Regular rhythm. No murmur No rub. Abd: Non-tender. Non-distended. No masses. No organmegaly. Normal bowel sounds. Skin: Warm and dry. No nodule on exposed extremities. No rash on exposed extremities. Lymph: No cervical LAD. No supraclavicular LAD. Ext: No joint deformity. No clubbing. No cyanosis. No edema  Neuro: Awake. Follows commands. Positive pupils/gag/corneals. Normal pain response. Lab Results:  CBC:   Recent Labs     19  1139 06/10/19  0320   WBC 17.5* 7.9   HGB 14.1 12.3*   HCT 40.8 36.8*   MCV 87.7 88.5    233       BMP:  Recent Labs     19  1139 06/10/19  0320   * 138   K 4.0 3.7   CL 96* 105   CO2 20* 21*   PHOS  --  2.4*   BUN 11 7*   CREATININE 0.7 0.5*    ALB:3,BILIDIR:3,BILITOT:3,ALKPHOS:3)@    PT/INR: No results for input(s): PROTIME, INR in the last 72 hours.     Cultures:  Sputum: not available  Blood: not available    ABG:   No results for input(s): PH, PO2, PCO2, HCO3, BE, O2SAT, METHB, O2HB, COHB, O2CON, HHB, THB in the last 72 hours. Films:     CTA CHEST W CONTRAST   Final Result   No central pulmonary embolism or aortic dissection. Extensive bullous emphysema with a progressive patchy masslike   infiltrate in the left upper lobe with extensive lymphadenopathy in   the mediastinum and hilum. Infectious inflammatory process like   pneumonia is a consideration. Underlying malignancy is not excluded. Short interval follow-up examination with repeat CT scan in 6-8 weeks   is recommended to see resolution. It persists, further assessment by   biopsy is recommended. ALERT:  THIS IS AN ABNORMAL REPORT            XR CHEST STANDARD (2 VW)   Final Result   Left upper lobe pneumonia appears resolved. .        Assessment:  1. Pneumonia, nosocomial as seen above. Malignancy is thought to be far less likely given the time intervals. Going from left to right June, May, and April 2019 at the dates of the scans  2. Parainfluenza 3 pneumonia  3. GOLD class 4 COPD      Plan:  1. Supportive care  2. Will discuss bronchoscopy with infectious disease as the infiltrate may represent an opportunistic pathogen      Thanks for letting us see this patient in consultation. Please contact us with any questions. Office [phone] or after hours through [employer], x [phone]. Please note that voice recognition technology was used in the preparation of this note and make therefore it may contain inadvertent transcription errors    Antolin Hutton M.D., F.C.C.P.     [facility], [address] [place] [address], [address] [place] [address]

## 2019-06-10 NOTE — H&P
upper lobe pneumonia ([facility] 10/12/2017    Pulmonary emphysema with fibrosis of lung ([facility] 11/15/2018    Right lower lobe pneumonia ([facility] 11/15/2018    Recurrent 4/23/19    S/P lumbar fusion 6/1/2019    Thoracic ascending aortic aneurysm ([facility] 11/15/2018    Proximal ascending aorta; 3.8 cm; 11/15/18         Past Surgical History:   Procedure Laterality Date    ABSCESS DRAINAGE  2006    X2 RECTAL ABSCESS    COLONOSCOPY  11/18/2013    LUMBAR FUSION  03/2019    [facility]       Medications Prior to Admission:    Medications Prior to Admission: levofloxacin (LEVAQUIN) 750 MG tablet, Take 1 tablet by mouth daily for 10 days  Fluticasone-Umeclidin-Vilant (TRELEGY ELLIPTA) 100-62.5-25 MCG/INH AEPB, Inhale 1 puff into the lungs daily (Patient taking differently: Inhale 1 puff into the lungs every morning )  albuterol sulfate  (90 Base) MCG/ACT inhaler, Inhale 2 puffs into the lungs every 6 hours as needed for Wheezing or Shortness of Breath  etodolac (LODINE) 500 MG tablet, Take 500 mg by mouth every morning   OXYGEN, Inhale 3.5 L into the lungs continuous prn     Allergies:    Patient has no known allergies. Social History:    reports that he quit smoking about 4 years ago. His smoking use included cigarettes. He started smoking about 50 years ago. He has a 92.00 pack-year smoking history. He has never used smokeless tobacco. He reports that he does not drink alcohol or use drugs. Family History:   family history includes Other in his father and mother. REVIEW OF SYSTEMS:  As above in the HPI, otherwise negative    PHYSICAL EXAM:    VS: /68   Pulse 105   Temp 96.5 °F (35.8 °C) (Oral)   Resp 18   Ht 6' 1\" (1.854 m)   Wt 209 lb 6.4 oz (95 kg)   SpO2 93%   BMI 27.63 kg/m²     General appearance: Alert, Awake, Oriented times 3, no distress  Skin: Warm and dry ; no rashes  Head: Normocephalic. No masses, lesions or tenderness noted  Eyes: Conjunctivae pink, sclera white. PERRL,EOM-I  Ears: External ears normal  Nose/Sinuses: Nares normal. Septum midline. Mucosa normal. No drainage  Oropharynx: Oropharynx clear with no exudate seen  Neck: Supple. No jugular venous distension, lymphadenopathy or thyromegaly Trachea midline  Lungs: Decreased excursion bilaterally; occasional rhonchi left lung right lung mostly clear. Heart: S1 S2  Regular rate and rhythm. No rub, murmur or gallop  Abdomen: Soft, non-tender. BS normal. No masses, organomegaly; no rebound or guarding  Extremities: No edema, Peripheral pulses palpable  Musculoskeletal: Muscular strength appears intact. Neuro:  No focal motor defects ; II-XII grossly intact .  GLASER equally  Breast: deferred  Rectal: deferred  Genitalia:  Deferred  Back: Slow healing of back wound, 4 cm x 2 cm no evidence of infection    LABS:  CBC:   Lab Results   Component Value Date    WBC 7.9 06/10/2019    RBC 4.16 06/10/2019    HGB 12.3 06/10/2019    HCT 36.8 06/10/2019    MCV 88.5 06/10/2019    MCH 29.6 06/10/2019    MCHC 33.4 06/10/2019    RDW 14.7 06/10/2019     06/10/2019    MPV 9.2 06/10/2019     CBC with Differential:    Lab Results   Component Value Date    WBC 7.9 06/10/2019    RBC 4.16 06/10/2019    HGB 12.3 06/10/2019    HCT 36.8 06/10/2019     06/10/2019    MCV 88.5 06/10/2019    MCH 29.6 06/10/2019    MCHC 33.4 06/10/2019    RDW 14.7 06/10/2019    LYMPHOPCT 3.6 06/10/2019    MONOPCT 1.1 06/10/2019    BASOPCT 0.1 06/10/2019    MONOSABS 0.09 06/10/2019    LYMPHSABS 0.28 06/10/2019    EOSABS 0.00 06/10/2019    BASOSABS 0.01 06/10/2019     Hemoglobin/Hematocrit:    Lab Results   Component Value Date    HGB 12.3 06/10/2019    HCT 36.8 06/10/2019     CMP:    Lab Results   Component Value Date     06/10/2019    K 3.7 06/10/2019    K 4.4 04/22/2019     06/10/2019    CO2 21 06/10/2019    BUN 7 06/10/2019    CREATININE 0.5 06/10/2019    GFRAA >60 06/10/2019    LABGLOM >60 06/10/2019    GLUCOSE 231 06/10/2019    PROT 6.5 05/30/2019    LABALBU 3.6 05/30/2019    CALCIUM 8.9 06/10/2019    BILITOT 0.9 05/30/2019    ALKPHOS 73 05/30/2019    AST 12 05/30/2019    ALT 7 05/30/2019     BMP:    Lab Results   Component Value Date     06/10/2019    K 3.7 06/10/2019    K 4.4 04/22/2019     06/10/2019    CO2 21 06/10/2019    BUN 7 06/10/2019    LABALBU 3.6 05/30/2019    CREATININE 0.5 06/10/2019    CALCIUM 8.9 06/10/2019    GFRAA >60 06/10/2019    LABGLOM >60 06/10/2019    GLUCOSE 231 06/10/2019     Hepatic Function Panel:    Lab Results   Component Value Date    ALKPHOS 73 05/30/2019    ALT 7 05/30/2019    AST 12 05/30/2019    PROT 6.5 05/30/2019    BILITOT 0.9 05/30/2019    LABALBU 3.6 05/30/2019     Magnesium:    Lab Results   Component Value Date    MG 2.1 06/10/2019     Phosphorus:    Lab Results   Component Value Date    PHOS 2.4 06/10/2019     Uric Acid:    Lab Results   Component Value Date    LABURIC 3.5 05/31/2019     PT/INR:    Lab Results   Component Value Date    PROTIME 12.5 05/30/2019    INR 1.1 05/30/2019     Warfarin PT/INR:  No components found for: PTPATWAR, PTINRWAR  PTT:    Lab Results   Component Value Date    APTT 31.4 05/30/2019   [APTT}  Troponin:    Lab Results   Component Value Date    TROPONINI <0.01 06/09/2019     Last 3 Troponin:    Lab Results   Component Value Date    TROPONINI <0.01 06/09/2019    TROPONINI <0.01 05/31/2019    TROPONINI <0.01 05/28/2019     U/A:    Lab Results   Component Value Date    COLORU Yellow 06/09/2019    PROTEINU Negative 06/09/2019    PHUR 6.0 06/09/2019    LABCAST MANY 05/30/2019    WBCUA NONE 06/09/2019    RBCUA NONE 06/09/2019    MUCUS Present 04/22/2019    BACTERIA NONE 06/09/2019    CLARITYU Clear 06/09/2019    SPECGRAV 1.010 06/09/2019    LEUKOCYTESUR Negative 06/09/2019    UROBILINOGEN 0.2 06/09/2019    BILIRUBINUR LARGE 06/09/2019    BLOODU Negative 06/09/2019    GLUCOSEU Negative 06/09/2019     ABG:    Lab Results   Component Value Date    PH 7.440 05/28/2019    PCO2 32.1 05/28/2019    PO2 59.1 05/28/2019    HCO3 21.3 05/28/2019    BE -1.8 05/28/2019    O2SAT 91.6 05/28/2019     HgBA1c:    Lab Results   Component Value Date    LABA1C 5.4 06/09/2019     FLP:    Lab Results   Component Value Date    TRIG 35 06/10/2019    HDL 44 06/10/2019    LDLCALC 60 06/10/2019    LABVLDL 7 06/10/2019     TSH:    Lab Results   Component Value Date    TSH 0.489 06/10/2019     VITAMIN B12: No components found for: B12  FOLATE:    Lab Results   Component Value Date    FOLATE 11.4 06/09/2019     IRON:  No results found for: IRON    RADIOLOGY:  CTA CHEST W CONTRAST   Final Result   No central pulmonary embolism or aortic dissection. Extensive bullous emphysema with a progressive patchy masslike   infiltrate in the left upper lobe with extensive lymphadenopathy in   the mediastinum and hilum. Infectious inflammatory process like   pneumonia is a consideration. Underlying malignancy is not excluded. Short interval follow-up examination with repeat CT scan in 6-8 weeks   is recommended to see resolution. It persists, further assessment by   biopsy is recommended. ALERT:  THIS IS AN ABNORMAL REPORT            XR CHEST STANDARD (2 VW)   Final Result   Left upper lobe pneumonia appears resolved.                       ASSESSMENT:      Active Hospital Problems    Diagnosis    Infection due to parainfluenza virus 3 [B34.8]    Hypophosphatemia [E83.39]    Glucose intolerance [E74.39]    Hilar adenopathy [R59.0]    Sepsis ([address] [A41.9]    Pulmonary emphysema with fibrosis of lung ([address] [J43.9, J84.10]    Thoracic ascending aortic aneurysm (HCC) [I71.2]    Bullous emphysema ([address] [J43.9]    COPD (chronic obstructive pulmonary disease) (HCC) [J44.9]    Acute and chronic respiratory failure with hypoxia ([facility]) [J96.21]       PLAN:  Medications discussed with patient  GI prophylaxis  DVT prophylaxis  Consultants notes reviewed  Methylprednisolone 80 mg IV every 8 hours  Sliding scale insulin to cover glucose  Aerosol treatments  Pulmonary consult  Replace potassium phosphate 15 mmol IV  Avoid antibiotics at this time  Isolation              Please note that over 50 minutes was spent in evaluating the patient, review of records and results, discussion with staff/family, etc.    [address]  11:56 AM  6/10/2019    Voice recognition software use for dictation

## 2019-06-11 LAB
ANION GAP SERPL CALCULATED.3IONS-SCNC: 12 MMOL/L (ref 7–16)
BASOPHILS ABSOLUTE: 0.02 E9/L (ref 0–0.2)
BASOPHILS RELATIVE PERCENT: 0.1 % (ref 0–2)
BUN BLDV-MCNC: 9 MG/DL (ref 8–23)
CALCIUM SERPL-MCNC: 9.3 MG/DL (ref 8.6–10.2)
CHLORIDE BLD-SCNC: 103 MMOL/L (ref 98–107)
CO2: 23 MMOL/L (ref 22–29)
CREAT SERPL-MCNC: 0.5 MG/DL (ref 0.7–1.2)
EOSINOPHILS ABSOLUTE: 0 E9/L (ref 0.05–0.5)
EOSINOPHILS RELATIVE PERCENT: 0 % (ref 0–6)
GFR AFRICAN AMERICAN: >60
GFR NON-AFRICAN AMERICAN: >60 ML/MIN/1.73
GLUCOSE BLD-MCNC: 159 MG/DL (ref 74–99)
GRAM STAIN ORDERABLE: NORMAL
HCT VFR BLD CALC: 37.1 % (ref 37–54)
HEMOGLOBIN: 12.4 G/DL (ref 12.5–16.5)
IMMATURE GRANULOCYTES #: 0.18 E9/L
IMMATURE GRANULOCYTES %: 1.2 % (ref 0–5)
LYMPHOCYTES ABSOLUTE: 0.27 E9/L (ref 1.5–4)
LYMPHOCYTES RELATIVE PERCENT: 1.8 % (ref 20–42)
MAGNESIUM: 1.9 MG/DL (ref 1.6–2.6)
MCH RBC QN AUTO: 29.9 PG (ref 26–35)
MCHC RBC AUTO-ENTMCNC: 33.4 % (ref 32–34.5)
MCV RBC AUTO: 89.4 FL (ref 80–99.9)
METER GLUCOSE: 121 MG/DL (ref 74–99)
METER GLUCOSE: 121 MG/DL (ref 74–99)
METER GLUCOSE: 135 MG/DL (ref 74–99)
METER GLUCOSE: 150 MG/DL (ref 74–99)
MONOCYTES ABSOLUTE: 0.31 E9/L (ref 0.1–0.95)
MONOCYTES RELATIVE PERCENT: 2.1 % (ref 2–12)
NEUTROPHILS ABSOLUTE: 14.2 E9/L (ref 1.8–7.3)
NEUTROPHILS RELATIVE PERCENT: 94.8 % (ref 43–80)
PDW BLD-RTO: 15 FL (ref 11.5–15)
PHOSPHORUS: 2.1 MG/DL (ref 2.5–4.5)
PLATELET # BLD: 264 E9/L (ref 130–450)
PMV BLD AUTO: 9.1 FL (ref 7–12)
POTASSIUM SERPL-SCNC: 4.2 MMOL/L (ref 3.5–5)
RBC # BLD: 4.15 E12/L (ref 3.8–5.8)
SODIUM BLD-SCNC: 138 MMOL/L (ref 132–146)
WBC # BLD: 15 E9/L (ref 4.5–11.5)
WOUND/ABSCESS: NORMAL

## 2019-06-11 PROCEDURE — 85025 COMPLETE CBC W/AUTO DIFF WBC: CPT

## 2019-06-11 PROCEDURE — 94640 AIRWAY INHALATION TREATMENT: CPT

## 2019-06-11 PROCEDURE — 6360000002 HC RX W HCPCS: Performed by: INTERNAL MEDICINE

## 2019-06-11 PROCEDURE — 2580000003 HC RX 258

## 2019-06-11 PROCEDURE — 6370000000 HC RX 637 (ALT 250 FOR IP): Performed by: INTERNAL MEDICINE

## 2019-06-11 PROCEDURE — 2700000000 HC OXYGEN THERAPY PER DAY

## 2019-06-11 PROCEDURE — 2060000000 HC ICU INTERMEDIATE R&B

## 2019-06-11 PROCEDURE — 84100 ASSAY OF PHOSPHORUS: CPT

## 2019-06-11 PROCEDURE — 82962 GLUCOSE BLOOD TEST: CPT

## 2019-06-11 PROCEDURE — 2500000003 HC RX 250 WO HCPCS: Performed by: INTERNAL MEDICINE

## 2019-06-11 PROCEDURE — 2580000003 HC RX 258: Performed by: INTERNAL MEDICINE

## 2019-06-11 PROCEDURE — 83735 ASSAY OF MAGNESIUM: CPT

## 2019-06-11 PROCEDURE — 80048 BASIC METABOLIC PNL TOTAL CA: CPT

## 2019-06-11 PROCEDURE — 6370000000 HC RX 637 (ALT 250 FOR IP): Performed by: SPECIALIST

## 2019-06-11 RX ADMIN — GUAIFENESIN AND CODEINE PHOSPHATE 5 ML: 10; 100 LIQUID ORAL at 23:40

## 2019-06-11 RX ADMIN — POTASSIUM PHOSPHATE, MONOBASIC AND POTASSIUM PHOSPHATE, DIBASIC 20 MMOL: 224; 236 INJECTION, SOLUTION, CONCENTRATE INTRAVENOUS at 12:05

## 2019-06-11 RX ADMIN — SODIUM CHLORIDE: 9 INJECTION, SOLUTION INTRAVENOUS at 05:30

## 2019-06-11 RX ADMIN — IPRATROPIUM BROMIDE AND ALBUTEROL SULFATE 1 AMPULE: .5; 3 SOLUTION RESPIRATORY (INHALATION) at 12:53

## 2019-06-11 RX ADMIN — WATER 10 ML: 1 INJECTION INTRAMUSCULAR; INTRAVENOUS; SUBCUTANEOUS at 12:05

## 2019-06-11 RX ADMIN — GUAIFENESIN AND CODEINE PHOSPHATE 5 ML: 10; 100 LIQUID ORAL at 03:51

## 2019-06-11 RX ADMIN — DOXYCYCLINE HYCLATE 100 MG: 100 CAPSULE ORAL at 20:57

## 2019-06-11 RX ADMIN — BENZONATATE 200 MG: 100 CAPSULE ORAL at 20:57

## 2019-06-11 RX ADMIN — BENZONATATE 200 MG: 100 CAPSULE ORAL at 08:42

## 2019-06-11 RX ADMIN — Medication 10 ML: at 08:43

## 2019-06-11 RX ADMIN — DOXYCYCLINE HYCLATE 100 MG: 100 CAPSULE ORAL at 08:42

## 2019-06-11 RX ADMIN — Medication 10 ML: at 12:05

## 2019-06-11 RX ADMIN — GUAIFENESIN AND CODEINE PHOSPHATE 5 ML: 10; 100 LIQUID ORAL at 14:10

## 2019-06-11 RX ADMIN — IBUPROFEN 200 MG: 200 TABLET, FILM COATED ORAL at 16:51

## 2019-06-11 RX ADMIN — FORMOTEROL FUMARATE DIHYDRATE 20 MCG: 20 SOLUTION RESPIRATORY (INHALATION) at 20:10

## 2019-06-11 RX ADMIN — METHYLPREDNISOLONE SODIUM SUCCINATE 80 MG: 125 INJECTION, POWDER, LYOPHILIZED, FOR SOLUTION INTRAMUSCULAR; INTRAVENOUS at 20:57

## 2019-06-11 RX ADMIN — METHYLPREDNISOLONE SODIUM SUCCINATE 80 MG: 125 INJECTION, POWDER, LYOPHILIZED, FOR SOLUTION INTRAMUSCULAR; INTRAVENOUS at 03:51

## 2019-06-11 RX ADMIN — GUAIFENESIN AND CODEINE PHOSPHATE 5 ML: 10; 100 LIQUID ORAL at 19:28

## 2019-06-11 RX ADMIN — IBUPROFEN 200 MG: 200 TABLET, FILM COATED ORAL at 08:43

## 2019-06-11 RX ADMIN — IPRATROPIUM BROMIDE AND ALBUTEROL SULFATE 1 AMPULE: .5; 3 SOLUTION RESPIRATORY (INHALATION) at 09:44

## 2019-06-11 RX ADMIN — GUAIFENESIN AND CODEINE PHOSPHATE 5 ML: 10; 100 LIQUID ORAL at 08:43

## 2019-06-11 RX ADMIN — METHYLPREDNISOLONE SODIUM SUCCINATE 80 MG: 125 INJECTION, POWDER, LYOPHILIZED, FOR SOLUTION INTRAMUSCULAR; INTRAVENOUS at 12:05

## 2019-06-11 RX ADMIN — IBUPROFEN 200 MG: 200 TABLET, FILM COATED ORAL at 12:06

## 2019-06-11 RX ADMIN — Medication 10 ML: at 20:57

## 2019-06-11 RX ADMIN — INSULIN LISPRO 1 UNITS: 100 INJECTION, SOLUTION INTRAVENOUS; SUBCUTANEOUS at 16:51

## 2019-06-11 RX ADMIN — BENZONATATE 200 MG: 100 CAPSULE ORAL at 14:09

## 2019-06-11 RX ADMIN — BUDESONIDE 250 MCG: 0.25 SUSPENSION RESPIRATORY (INHALATION) at 09:43

## 2019-06-11 RX ADMIN — IPRATROPIUM BROMIDE AND ALBUTEROL SULFATE 1 AMPULE: .5; 3 SOLUTION RESPIRATORY (INHALATION) at 17:19

## 2019-06-11 RX ADMIN — FORMOTEROL FUMARATE DIHYDRATE 20 MCG: 20 SOLUTION RESPIRATORY (INHALATION) at 09:43

## 2019-06-11 RX ADMIN — PANTOPRAZOLE SODIUM 40 MG: 40 TABLET, DELAYED RELEASE ORAL at 06:10

## 2019-06-11 RX ADMIN — BUDESONIDE 250 MCG: 0.25 SUSPENSION RESPIRATORY (INHALATION) at 20:10

## 2019-06-11 RX ADMIN — IPRATROPIUM BROMIDE AND ALBUTEROL SULFATE 1 AMPULE: .5; 3 SOLUTION RESPIRATORY (INHALATION) at 20:10

## 2019-06-11 ASSESSMENT — PAIN SCALES - GENERAL
PAINLEVEL_OUTOF10: 0

## 2019-06-11 NOTE — PROGRESS NOTES
flush  10 mL Intravenous BID       Diet:   DIET GENERAL;     EXAM:  General: No distress. Alert. Eyes: PERRL. No sclera icterus. No conjunctival injection. ENT: No discharge. Pharynx clear. Neck: Trachea midline. Normal thyroid. Resp: No accessory muscle use. No rales. No wheezing. No rhonchi. CV: Regular rate. Regular rhythm. No murmur or rub. Abd: Non-tender. Non-distended. No masses. No organomegaly. Normal bowel sounds. Skin: Warm and dry. No nodule on exposed extremities. No rash on exposed extremities. Ext: No cyanosis, clubbing, edema  Lymph: No cervical LAD. No supraclavicular LAD. M/S: No cyanosis. No joint deformity. No clubbing. Neuro: Awake. Follows commands. Positive pupils/gag/corneals. Normal pain response. Results:  CBC:   Recent Labs     06/09/19  1139 06/10/19  0320 06/11/19  0356   WBC 17.5* 7.9 15.0*   HGB 14.1 12.3* 12.4*   HCT 40.8 36.8* 37.1   MCV 87.7 88.5 89.4    233 264     BMP:   Recent Labs     06/09/19  1139 06/10/19  0320 06/11/19  0356   * 138 138   K 4.0 3.7 4.2   CL 96* 105 103   CO2 20* 21* 23   PHOS  --  2.4* 2.1*   BUN 11 7* 9   CREATININE 0.7 0.5* 0.5*     LIVER PROFILE: No results for input(s): AST, ALT, LIPASE, BILIDIR, BILITOT, ALKPHOS in the last 72 hours. Invalid input(s): AMYLASE,  ALB  PT/INR: No results for input(s): PROTIME, INR in the last 72 hours. APTT: No results for input(s): APTT in the last 72 hours. Pathology:  1. N/A      Microbiology:  1. None    Recent ABG:   No results for input(s): PH, PO2, PCO2, HCO3, BE, O2SAT, METHB, O2HB, COHB, O2CON, HHB, THB in the last 72 hours. Recent Films:  CTA CHEST W CONTRAST   Final Result   No central pulmonary embolism or aortic dissection. Extensive bullous emphysema with a progressive patchy masslike   infiltrate in the left upper lobe with extensive lymphadenopathy in   the mediastinum and hilum. Infectious inflammatory process like   pneumonia is a consideration. Underlying malignancy is not excluded. Short interval follow-up examination with repeat CT scan in 6-8 weeks   is recommended to see resolution. It persists, further assessment by   biopsy is recommended. ALERT:  THIS IS AN ABNORMAL REPORT            XR CHEST STANDARD (2 VW)   Final Result   Left upper lobe pneumonia appears resolved. Assessment:  1. Pneumonia, nosocomial as seen above. Malignancy is thought to be far less likely given the time intervals. Going from left to right June, May, and April 2019 at the dates of the scans  2. Parainfluenza 3 pneumonia  3. GOLD class 4 COPD        Plan:  1. Supportive care  2. Bronchoscopy with BAL of the anterior segment of the left upper lobe.         Care reviewed with the staff and the patient's family as available. Please note that voice recognition technology was used in the preparation of this note and make therefore it may contain inadvertent transcription errors. Antolin Hutton M.D., F.C.C.P.     [facility], [address] [place] [address], [address], [facility] - [facility]

## 2019-06-11 NOTE — PROGRESS NOTES
[facility] Quality Flow/Interdisciplinary Rounds Progress Note        Quality Flow Rounds held on June 11, 2019    Disciplines Attending:  Bedside Nurse, ,  and Nursing Unit Leadership    Leonie Mcdonald was admitted on 6/9/2019 11:17 AM    Anticipated Discharge Date:  Expected Discharge Date: 06/28/19    Disposition:    Sandro Score:  Sandro Scale Score: 22    Readmission Risk              Risk of Unplanned Readmission:        14           Discussed patient goal for the day, patient clinical progression, and barriers to discharge. The following Goal(s) of the Day/Commitment(s) have been identified:  Wean IV fluids, wean IV steroids, check Pulmonary plan.       Trish Cesar  June 11, 2019

## 2019-06-11 NOTE — PROGRESS NOTES
Called surgery scheduling at 1830, L/M requesting time for Bronchoscopy tomorrow. Clinical manager Antonieta Culver also made aware.   Electronically signed by Junior Guerra RN on 6/11/2019 at 4:33 PM

## 2019-06-11 NOTE — PROGRESS NOTES
Alison Narayanan MD   100 mg at 06/11/19 0842    ibuprofen (ADVIL;MOTRIN) tablet 200 mg  200 mg Oral TID WC Jim A Mihok, DO   200 mg at 06/11/19 0843    0.9 % sodium chloride infusion   Intravenous Continuous Cathryn Oiler Mihok,  mL/hr at 06/11/19 0530      budesonide (PULMICORT) nebulizer suspension 250 mcg  250 mcg Nebulization BID Cathryn Oiler Mihok, DO   250 mcg at 06/11/19 0943    formoterol (PERFOROMIST) nebulizer solution 20 mcg  20 mcg Nebulization BID Cathryn Oiler Mihok, DO   20 mcg at 06/11/19 0943    methylPREDNISolone sodium (SOLU-MEDROL) injection 80 mg  80 mg Intravenous Q8H Jim ANG Moraleshok, DO   80 mg at 06/11/19 0351    guaiFENesin-codeine (GUAIFENESIN AC) 100-10 MG/5ML liquid 5 mL  5 mL Oral Q4H PRN Cathryn Oiler Mihok, DO   5 mL at 06/11/19 0843    ipratropium-albuterol (DUONEB) nebulizer solution 1 ampule  1 ampule Inhalation Q4H WA Jim A Mihok, DO   1 ampule at 06/11/19 0944    ondansetron (ZOFRAN) injection 4 mg  4 mg Intravenous Q6H PRN Cathryn Oiler Mihok, DO        pantoprazole (PROTONIX) tablet 40 mg  40 mg Oral QAM AC Jim A Mihok, DO   40 mg at 06/11/19 0610    acetaminophen (TYLENOL) tablet 650 mg  650 mg Oral Q4H PRN Cathryn Oiler Mihok, DO        sodium chloride flush 0.9 % injection 10 mL  10 mL Intravenous BID Cathryn Oiler Mihok, DO   10 mL at 06/11/19 0843    sodium chloride flush 0.9 % injection 10 mL  10 mL Intravenous PRN Cathryn Oiler Mihok, DO         Scheduled Meds:   potassium phosphate IVPB  20 mmol Intravenous Once    insulin lispro  0-6 Units Subcutaneous TID     insulin lispro  0-3 Units Subcutaneous Nightly    benzonatate  200 mg Oral TID    doxycycline hyclate  100 mg Oral 2 times per day    ibuprofen  200 mg Oral TID     budesonide  250 mcg Nebulization BID    formoterol  20 mcg Nebulization BID    methylPREDNISolone  80 mg Intravenous Q8H    ipratropium-albuterol  1 ampule Inhalation Q4H WA    pantoprazole  40 mg Oral QAM AC    sodium chloride flush  10 mL Intravenous BID       Continuous Infusions:   dextrose      sodium chloride 100 mL/hr at 19 0530         Data:   Temperature:  Current - Temp: 98.3 °F (36.8 °C); Max - Temp  Av °F (36.7 °C)  Min: 97.6 °F (36.4 °C)  Max: 98.3 °F (36.8 °C)    Respiratory Rate : Resp  Av  Min: 18  Max: 18    Pulse Range: Pulse  Av  Min: 92  Max: 108    Blood Presuure Range:  Systolic (18BZY), C , Min:119 , YOK:584   ; Diastolic (86FUC), XUI:62, Min:61, Max:86      Pulse ox Range: SpO2  Av.7 %  Min: 94 %  Max: 97 %    Patient Vitals for the past 8 hrs:   BP Pulse Resp SpO2 Weight   19 0830 (!) 146/86 92 18 97 % --   19 0500 -- -- -- -- 211 lb 9.6 oz (96 kg)         Intake/Output Summary (Last 24 hours) at 2019 1101  Last data filed at 2019 0848  Gross per 24 hour   Intake 3203.7 ml   Output 1525 ml   Net 1678.7 ml       I/O last 3 completed shifts: In: 3203.7 [P.O.:720;  I.V.:2233.7; IV Piggyback:250]  Out: 1050 [Urine:1050]    I/O this shift:  In: -   Out: 475 [Urine:475]    Wt Readings from Last 3 Encounters:   19 211 lb 9.6 oz (96 kg)   19 213 lb (96.6 kg)   19 205 lb (93 kg)       Labs:   CBC:   Lab Results   Component Value Date    WBC 15.0 2019    RBC 4.15 2019    HGB 12.4 2019    HCT 37.1 2019    MCV 89.4 2019    MCH 29.9 2019    MCHC 33.4 2019    RDW 15.0 2019     2019    MPV 9.1 2019     CBC with Differential:    Lab Results   Component Value Date    WBC 15.0 2019    RBC 4.15 2019    HGB 12.4 2019    HCT 37.1 2019     2019    MCV 89.4 2019    MCH 29.9 2019    MCHC 33.4 2019    RDW 15.0 2019    LYMPHOPCT 1.8 2019    MONOPCT 2.1 2019    BASOPCT 0.1 2019    MONOSABS 0.31 2019    LYMPHSABS 0.27 2019    EOSABS 0.00 2019    BASOSABS 0.02 2019     Hemoglobin/Hematocrit:    Lab Results   Component Value Date HGB 12.4 06/11/2019    HCT 37.1 06/11/2019     CMP:    Lab Results   Component Value Date     06/11/2019    K 4.2 06/11/2019    K 4.4 04/22/2019     06/11/2019    CO2 23 06/11/2019    BUN 9 06/11/2019    CREATININE 0.5 06/11/2019    GFRAA >60 06/11/2019    LABGLOM >60 06/11/2019    GLUCOSE 159 06/11/2019    PROT 6.5 05/30/2019    LABALBU 3.6 05/30/2019    CALCIUM 9.3 06/11/2019    BILITOT 0.9 05/30/2019    ALKPHOS 73 05/30/2019    AST 12 05/30/2019    ALT 7 05/30/2019     BMP:    Lab Results   Component Value Date     06/11/2019    K 4.2 06/11/2019    K 4.4 04/22/2019     06/11/2019    CO2 23 06/11/2019    BUN 9 06/11/2019    LABALBU 3.6 05/30/2019    CREATININE 0.5 06/11/2019    CALCIUM 9.3 06/11/2019    GFRAA >60 06/11/2019    LABGLOM >60 06/11/2019    GLUCOSE 159 06/11/2019     Hepatic Function Panel:    Lab Results   Component Value Date    ALKPHOS 73 05/30/2019    ALT 7 05/30/2019    AST 12 05/30/2019    PROT 6.5 05/30/2019    BILITOT 0.9 05/30/2019    LABALBU 3.6 05/30/2019     Magnesium:    Lab Results   Component Value Date    MG 1.9 06/11/2019     Phosphorus:    Lab Results   Component Value Date    PHOS 2.1 06/11/2019     Uric Acid:    Lab Results   Component Value Date    LABURIC 3.5 05/31/2019     PT/INR:    Lab Results   Component Value Date    PROTIME 12.5 05/30/2019    INR 1.1 05/30/2019     Warfarin PT/INR:  No components found for: PTPATWAR, PTINRWAR  PTT:    Lab Results   Component Value Date    APTT 31.4 05/30/2019   [APTT}  Troponin:    Lab Results   Component Value Date    TROPONINI <0.01 06/09/2019     Last 3 Troponin:    Lab Results   Component Value Date    TROPONINI <0.01 06/09/2019    TROPONINI <0.01 05/31/2019    TROPONINI <0.01 05/28/2019     U/A:    Lab Results   Component Value Date    COLORU Yellow 06/09/2019    PROTEINU Negative 06/09/2019    PHUR 6.0 06/09/2019    LABCAST MANY 05/30/2019    WBCUA NONE 06/09/2019    RBCUA NONE 06/09/2019    MUCUS Present 04/22/2019    BACTERIA NONE 06/09/2019    CLARITYU Clear 06/09/2019    SPECGRAV 1.010 06/09/2019    LEUKOCYTESUR Negative 06/09/2019    UROBILINOGEN 0.2 06/09/2019    BILIRUBINUR LARGE 06/09/2019    BLOODU Negative 06/09/2019    GLUCOSEU Negative 06/09/2019     ABG:    Lab Results   Component Value Date    PH 7.440 05/28/2019    PCO2 32.1 05/28/2019    PO2 59.1 05/28/2019    HCO3 21.3 05/28/2019    BE -1.8 05/28/2019    O2SAT 91.6 05/28/2019     HgBA1c:    Lab Results   Component Value Date    LABA1C 5.4 06/09/2019     FLP:    Lab Results   Component Value Date    TRIG 35 06/10/2019    HDL 44 06/10/2019    LDLCALC 60 06/10/2019    LABVLDL 7 06/10/2019     TSH:    Lab Results   Component Value Date    TSH 0.489 06/10/2019     VITAMIN B12: No components found for: B12  FOLATE:    Lab Results   Component Value Date    FOLATE 11.4 06/09/2019        CBC:  Recent Labs     06/09/19  1139 06/10/19  0320 06/11/19  0356   WBC 17.5* 7.9 15.0*   RBC 4.65 4.16 4.15   HGB 14.1 12.3* 12.4*   HCT 40.8 36.8* 37.1    233 264   MCV 87.7 88.5 89.4   MCH 30.3 29.6 29.9   MCHC 34.6* 33.4 33.4   RDW 14.8 14.7 15.0   LYMPHOPCT 3.5* 3.6* 1.8*   MONOPCT 5.4 1.1* 2.1   BASOPCT 0.1 0.1 0.1   MONOSABS 0.94 0.09* 0.31   LYMPHSABS 0.61* 0.28* 0.27*   EOSABS 0.03* 0.00* 0.00*   BASOSABS 0.02 0.01 0.02          H & H :  Recent Labs     06/09/19  1139 06/10/19  0320 06/11/19  0356   HGB 14.1 12.3* 12.4*       TSH:  Recent Labs     06/10/19  0320   TSH 0.489       GLUCOSE:No results for input(s): POCGLU in the last 72 hours. CMP:  Recent Labs     06/09/19  1139 06/10/19  0320 06/11/19  0356   * 138 138   K 4.0 3.7 4.2   CL 96* 105 103   CO2 20* 21* 23   BUN 11 7* 9   CREATININE 0.7 0.5* 0.5*   GFRAA >60 >60 >60   LABGLOM >60 >60 >60   GLUCOSE 110* 231* 159*   CALCIUM 9.2 8.9 9.3        BNP:No results found for: BNP    PROTIME/INR:No results for input(s): PROTIME, INR in the last 72 hours.     CRP:   Recent Labs     06/09/19  1515   CRP 5.7*       ESR:  Recent Labs     06/09/19  1515   SEDRATE 32*       LIPASE , AMYLASE:  Lab Results   Component Value Date    LIPASE 16 05/30/2019      No results found for: AMYLASE    ABGs:   Lab Results   Component Value Date    PH 7.440 05/28/2019    PO2 59.1 05/28/2019    PCO2 32.1 05/28/2019       CARDIAC:   Recent Labs     06/09/19  1139   TROPONINI <0.01       Lipid Profile:   Lab Results   Component Value Date    TRIG 35 06/10/2019    HDL 44 06/10/2019    LDLCALC 60 06/10/2019    CHOL 111 06/10/2019        Lab Results   Component Value Date    LABA1C 5.4 06/09/2019            RADIOLOGY: REVIEWED AVAILABLE REPORT  CTA CHEST W CONTRAST   Final Result   No central pulmonary embolism or aortic dissection. Extensive bullous emphysema with a progressive patchy masslike   infiltrate in the left upper lobe with extensive lymphadenopathy in   the mediastinum and hilum. Infectious inflammatory process like   pneumonia is a consideration. Underlying malignancy is not excluded. Short interval follow-up examination with repeat CT scan in 6-8 weeks   is recommended to see resolution. It persists, further assessment by   biopsy is recommended. ALERT:  THIS IS AN ABNORMAL REPORT            XR CHEST STANDARD (2 VW)   Final Result   Left upper lobe pneumonia appears resolved.                             Sameer Salas  DO   11:01 AM     6/11/2019      Voice recognition software used for dictation

## 2019-06-11 NOTE — PLAN OF CARE
Problem: Pain:  Goal: Pain level will decrease  Description  Pain level will decrease  Outcome: Met This Shift     Problem: Respiratory  Goal: O2 Sat > 90%  Outcome: Met This Shift     Problem: Airway Clearance - Ineffective:  Goal: Ability to maintain a clear airway will improve  Description  Ability to maintain a clear airway will improve  Outcome: Not Met This Shift     Problem: Airway Clearance - Ineffective:  Goal: Clear lung sounds  Description  Clear lung sounds  Outcome: Not Met This Shift

## 2019-06-12 ENCOUNTER — ANESTHESIA (OUTPATIENT)
Dept: ENDOSCOPY | Age: 69
DRG: 193 | End: 2019-06-12
Payer: MEDICARE

## 2019-06-12 ENCOUNTER — ANESTHESIA EVENT (OUTPATIENT)
Dept: ENDOSCOPY | Age: 69
DRG: 193 | End: 2019-06-12
Payer: MEDICARE

## 2019-06-12 VITALS — OXYGEN SATURATION: 90 % | DIASTOLIC BLOOD PRESSURE: 83 MMHG | SYSTOLIC BLOOD PRESSURE: 152 MMHG

## 2019-06-12 LAB
ANION GAP SERPL CALCULATED.3IONS-SCNC: 12 MMOL/L (ref 7–16)
ANISOCYTOSIS: ABNORMAL
BASOPHILS ABSOLUTE: 0 E9/L (ref 0–0.2)
BASOPHILS RELATIVE PERCENT: 0 % (ref 0–2)
BUN BLDV-MCNC: 9 MG/DL (ref 8–23)
CALCIUM SERPL-MCNC: 9.3 MG/DL (ref 8.6–10.2)
CHLORIDE BLD-SCNC: 103 MMOL/L (ref 98–107)
CO2: 26 MMOL/L (ref 22–29)
CREAT SERPL-MCNC: 0.5 MG/DL (ref 0.7–1.2)
CULTURE, RESPIRATORY: NORMAL
EOSINOPHILS ABSOLUTE: 0 E9/L (ref 0.05–0.5)
EOSINOPHILS RELATIVE PERCENT: 0 % (ref 0–6)
GFR AFRICAN AMERICAN: >60
GFR NON-AFRICAN AMERICAN: >60 ML/MIN/1.73
GLUCOSE BLD-MCNC: 138 MG/DL (ref 74–99)
HCT VFR BLD CALC: 36.8 % (ref 37–54)
HEMOGLOBIN: 12.2 G/DL (ref 12.5–16.5)
HYPOCHROMIA: ABNORMAL
IMMATURE GRANULOCYTES #: 0.17 E9/L
IMMATURE GRANULOCYTES %: 1.5 % (ref 0–5)
LYMPHOCYTES ABSOLUTE: 0.34 E9/L (ref 1.5–4)
LYMPHOCYTES RELATIVE PERCENT: 3.1 % (ref 20–42)
MAGNESIUM: 1.9 MG/DL (ref 1.6–2.6)
MCH RBC QN AUTO: 29.6 PG (ref 26–35)
MCHC RBC AUTO-ENTMCNC: 33.2 % (ref 32–34.5)
MCV RBC AUTO: 89.3 FL (ref 80–99.9)
METER GLUCOSE: 107 MG/DL (ref 74–99)
METER GLUCOSE: 119 MG/DL (ref 74–99)
METER GLUCOSE: 129 MG/DL (ref 74–99)
METER GLUCOSE: 191 MG/DL (ref 74–99)
MONOCYTES ABSOLUTE: 0.23 E9/L (ref 0.1–0.95)
MONOCYTES RELATIVE PERCENT: 2.1 % (ref 2–12)
NEUTROPHILS ABSOLUTE: 10.36 E9/L (ref 1.8–7.3)
NEUTROPHILS RELATIVE PERCENT: 93.3 % (ref 43–80)
PDW BLD-RTO: 14.8 FL (ref 11.5–15)
PHOSPHORUS: 2.5 MG/DL (ref 2.5–4.5)
PLATELET # BLD: 291 E9/L (ref 130–450)
PMV BLD AUTO: 9.2 FL (ref 7–12)
POTASSIUM SERPL-SCNC: 4 MMOL/L (ref 3.5–5)
RBC # BLD: 4.12 E12/L (ref 3.8–5.8)
SMEAR, RESPIRATORY: NORMAL
SODIUM BLD-SCNC: 141 MMOL/L (ref 132–146)
WBC # BLD: 11.1 E9/L (ref 4.5–11.5)

## 2019-06-12 PROCEDURE — 87102 FUNGUS ISOLATION CULTURE: CPT

## 2019-06-12 PROCEDURE — 2500000003 HC RX 250 WO HCPCS: Performed by: NURSE ANESTHETIST, CERTIFIED REGISTERED

## 2019-06-12 PROCEDURE — 87070 CULTURE OTHR SPECIMN AEROBIC: CPT

## 2019-06-12 PROCEDURE — 6360000002 HC RX W HCPCS: Performed by: INTERNAL MEDICINE

## 2019-06-12 PROCEDURE — 87015 SPECIMEN INFECT AGNT CONCNTJ: CPT

## 2019-06-12 PROCEDURE — 87206 SMEAR FLUORESCENT/ACID STAI: CPT

## 2019-06-12 PROCEDURE — 3700000000 HC ANESTHESIA ATTENDED CARE: Performed by: INTERNAL MEDICINE

## 2019-06-12 PROCEDURE — 84100 ASSAY OF PHOSPHORUS: CPT

## 2019-06-12 PROCEDURE — 6370000000 HC RX 637 (ALT 250 FOR IP): Performed by: SPECIALIST

## 2019-06-12 PROCEDURE — 3609011100 HC BRONCHOSCOPY BRUSHINGS: Performed by: INTERNAL MEDICINE

## 2019-06-12 PROCEDURE — 0BJ08ZZ INSPECTION OF TRACHEOBRONCHIAL TREE, VIA NATURAL OR ARTIFICIAL OPENING ENDOSCOPIC: ICD-10-PCS | Performed by: INTERNAL MEDICINE

## 2019-06-12 PROCEDURE — 2500000003 HC RX 250 WO HCPCS: Performed by: INTERNAL MEDICINE

## 2019-06-12 PROCEDURE — 87116 MYCOBACTERIA CULTURE: CPT

## 2019-06-12 PROCEDURE — 2060000000 HC ICU INTERMEDIATE R&B

## 2019-06-12 PROCEDURE — 87205 SMEAR GRAM STAIN: CPT

## 2019-06-12 PROCEDURE — 6360000002 HC RX W HCPCS: Performed by: NURSE ANESTHETIST, CERTIFIED REGISTERED

## 2019-06-12 PROCEDURE — 6370000000 HC RX 637 (ALT 250 FOR IP): Performed by: INTERNAL MEDICINE

## 2019-06-12 PROCEDURE — 2580000003 HC RX 258

## 2019-06-12 PROCEDURE — 87186 SC STD MICRODIL/AGAR DIL: CPT

## 2019-06-12 PROCEDURE — 80048 BASIC METABOLIC PNL TOTAL CA: CPT

## 2019-06-12 PROCEDURE — 2709999900 HC NON-CHARGEABLE SUPPLY: Performed by: INTERNAL MEDICINE

## 2019-06-12 PROCEDURE — 7100000011 HC PHASE II RECOVERY - ADDTL 15 MIN: Performed by: INTERNAL MEDICINE

## 2019-06-12 PROCEDURE — 83735 ASSAY OF MAGNESIUM: CPT

## 2019-06-12 PROCEDURE — 2700000000 HC OXYGEN THERAPY PER DAY

## 2019-06-12 PROCEDURE — 87305 ASPERGILLUS AG IA: CPT

## 2019-06-12 PROCEDURE — 7100000010 HC PHASE II RECOVERY - FIRST 15 MIN: Performed by: INTERNAL MEDICINE

## 2019-06-12 PROCEDURE — 82962 GLUCOSE BLOOD TEST: CPT

## 2019-06-12 PROCEDURE — 94640 AIRWAY INHALATION TREATMENT: CPT

## 2019-06-12 PROCEDURE — 2580000003 HC RX 258: Performed by: NURSE ANESTHETIST, CERTIFIED REGISTERED

## 2019-06-12 PROCEDURE — 88112 CYTOPATH CELL ENHANCE TECH: CPT

## 2019-06-12 PROCEDURE — 3700000001 HC ADD 15 MINUTES (ANESTHESIA): Performed by: INTERNAL MEDICINE

## 2019-06-12 PROCEDURE — 2580000003 HC RX 258: Performed by: INTERNAL MEDICINE

## 2019-06-12 PROCEDURE — 85025 COMPLETE CBC W/AUTO DIFF WBC: CPT

## 2019-06-12 RX ORDER — LIDOCAINE HYDROCHLORIDE 20 MG/ML
SOLUTION OROPHARYNGEAL PRN
Status: DISCONTINUED | OUTPATIENT
Start: 2019-06-12 | End: 2019-06-12 | Stop reason: ALTCHOICE

## 2019-06-12 RX ORDER — LIDOCAINE HYDROCHLORIDE 20 MG/ML
INJECTION, SOLUTION EPIDURAL; INFILTRATION; INTRACAUDAL; PERINEURAL PRN
Status: DISCONTINUED | OUTPATIENT
Start: 2019-06-12 | End: 2019-06-12 | Stop reason: ALTCHOICE

## 2019-06-12 RX ORDER — LIDOCAINE HYDROCHLORIDE 20 MG/ML
INJECTION, SOLUTION EPIDURAL; INFILTRATION; INTRACAUDAL; PERINEURAL PRN
Status: DISCONTINUED | OUTPATIENT
Start: 2019-06-12 | End: 2019-06-12 | Stop reason: SDUPTHER

## 2019-06-12 RX ORDER — SODIUM CHLORIDE 9 MG/ML
INJECTION, SOLUTION INTRAVENOUS CONTINUOUS PRN
Status: DISCONTINUED | OUTPATIENT
Start: 2019-06-12 | End: 2019-06-12 | Stop reason: SDUPTHER

## 2019-06-12 RX ORDER — GLYCOPYRROLATE 1 MG/5 ML
SYRINGE (ML) INTRAVENOUS PRN
Status: DISCONTINUED | OUTPATIENT
Start: 2019-06-12 | End: 2019-06-12 | Stop reason: SDUPTHER

## 2019-06-12 RX ORDER — PROPOFOL 10 MG/ML
INJECTION, EMULSION INTRAVENOUS PRN
Status: DISCONTINUED | OUTPATIENT
Start: 2019-06-12 | End: 2019-06-12 | Stop reason: SDUPTHER

## 2019-06-12 RX ADMIN — DOXYCYCLINE HYCLATE 100 MG: 100 CAPSULE ORAL at 08:51

## 2019-06-12 RX ADMIN — SODIUM CHLORIDE: 9 INJECTION, SOLUTION INTRAVENOUS at 12:15

## 2019-06-12 RX ADMIN — IBUPROFEN 200 MG: 200 TABLET, FILM COATED ORAL at 08:51

## 2019-06-12 RX ADMIN — IPRATROPIUM BROMIDE AND ALBUTEROL SULFATE 1 AMPULE: .5; 3 SOLUTION RESPIRATORY (INHALATION) at 12:11

## 2019-06-12 RX ADMIN — BUDESONIDE 250 MCG: 0.25 SUSPENSION RESPIRATORY (INHALATION) at 20:42

## 2019-06-12 RX ADMIN — GUAIFENESIN AND CODEINE PHOSPHATE 5 ML: 10; 100 LIQUID ORAL at 03:43

## 2019-06-12 RX ADMIN — LIDOCAINE HYDROCHLORIDE 100 MG: 20 INJECTION, SOLUTION EPIDURAL; INFILTRATION; INTRACAUDAL; PERINEURAL at 13:36

## 2019-06-12 RX ADMIN — INSULIN LISPRO 1 UNITS: 100 INJECTION, SOLUTION INTRAVENOUS; SUBCUTANEOUS at 20:38

## 2019-06-12 RX ADMIN — METHYLPREDNISOLONE SODIUM SUCCINATE 80 MG: 125 INJECTION, POWDER, LYOPHILIZED, FOR SOLUTION INTRAMUSCULAR; INTRAVENOUS at 15:08

## 2019-06-12 RX ADMIN — IBUPROFEN 200 MG: 200 TABLET, FILM COATED ORAL at 12:15

## 2019-06-12 RX ADMIN — GUAIFENESIN AND CODEINE PHOSPHATE 5 ML: 10; 100 LIQUID ORAL at 08:51

## 2019-06-12 RX ADMIN — BENZONATATE 200 MG: 100 CAPSULE ORAL at 15:31

## 2019-06-12 RX ADMIN — IPRATROPIUM BROMIDE AND ALBUTEROL SULFATE 1 AMPULE: .5; 3 SOLUTION RESPIRATORY (INHALATION) at 08:24

## 2019-06-12 RX ADMIN — Medication 10 ML: at 20:32

## 2019-06-12 RX ADMIN — IPRATROPIUM BROMIDE AND ALBUTEROL SULFATE 1 AMPULE: .5; 3 SOLUTION RESPIRATORY (INHALATION) at 20:42

## 2019-06-12 RX ADMIN — BENZONATATE 200 MG: 100 CAPSULE ORAL at 08:51

## 2019-06-12 RX ADMIN — FORMOTEROL FUMARATE DIHYDRATE 20 MCG: 20 SOLUTION RESPIRATORY (INHALATION) at 20:42

## 2019-06-12 RX ADMIN — DOXYCYCLINE HYCLATE 100 MG: 100 CAPSULE ORAL at 20:32

## 2019-06-12 RX ADMIN — Medication 0.2 MG: at 13:30

## 2019-06-12 RX ADMIN — BUDESONIDE 250 MCG: 0.25 SUSPENSION RESPIRATORY (INHALATION) at 08:24

## 2019-06-12 RX ADMIN — GUAIFENESIN AND CODEINE PHOSPHATE 5 ML: 10; 100 LIQUID ORAL at 15:31

## 2019-06-12 RX ADMIN — IBUPROFEN 200 MG: 200 TABLET, FILM COATED ORAL at 17:04

## 2019-06-12 RX ADMIN — IPRATROPIUM BROMIDE AND ALBUTEROL SULFATE 1 AMPULE: .5; 3 SOLUTION RESPIRATORY (INHALATION) at 17:08

## 2019-06-12 RX ADMIN — BENZONATATE 200 MG: 100 CAPSULE ORAL at 20:32

## 2019-06-12 RX ADMIN — SODIUM CHLORIDE: 9 INJECTION, SOLUTION INTRAVENOUS at 13:20

## 2019-06-12 RX ADMIN — METHYLPREDNISOLONE SODIUM SUCCINATE 80 MG: 125 INJECTION, POWDER, LYOPHILIZED, FOR SOLUTION INTRAMUSCULAR; INTRAVENOUS at 20:32

## 2019-06-12 RX ADMIN — METHYLPREDNISOLONE SODIUM SUCCINATE 80 MG: 125 INJECTION, POWDER, LYOPHILIZED, FOR SOLUTION INTRAMUSCULAR; INTRAVENOUS at 03:43

## 2019-06-12 RX ADMIN — SODIUM CHLORIDE: 9 INJECTION, SOLUTION INTRAVENOUS at 17:56

## 2019-06-12 RX ADMIN — WATER 10 ML: 1 INJECTION INTRAMUSCULAR; INTRAVENOUS; SUBCUTANEOUS at 15:08

## 2019-06-12 RX ADMIN — PROPOFOL 170 MG: 10 INJECTION, EMULSION INTRAVENOUS at 13:41

## 2019-06-12 RX ADMIN — FORMOTEROL FUMARATE DIHYDRATE 20 MCG: 20 SOLUTION RESPIRATORY (INHALATION) at 08:24

## 2019-06-12 RX ADMIN — PANTOPRAZOLE SODIUM 40 MG: 40 TABLET, DELAYED RELEASE ORAL at 06:26

## 2019-06-12 RX ADMIN — Medication 10 ML: at 08:51

## 2019-06-12 ASSESSMENT — PAIN SCALES - GENERAL
PAINLEVEL_OUTOF10: 0

## 2019-06-12 NOTE — ANESTHESIA PRE PROCEDURE
Department of Anesthesiology  Preprocedure Note       Name:  Cyndra Aase   Age:  79 y.o.  :  1950                                          MRN:  [id number]         Date:  2019      Surgeon: Bk Flores):  Deneen Rowland MD    Procedure: BRONCHOSCOPY (N/A )    Medications prior to admission:   Prior to Admission medications    Medication Sig Start Date End Date Taking?  Authorizing Provider   levofloxacin (LEVAQUIN) 750 MG tablet Take 1 tablet by mouth daily for 10 days 19 Yes Kam Graves MD   Fluticasone-Umeclidin-Vilant (TRELEGY ELLIPTA) 772-11.9-08 MCG/INH AEPB Inhale 1 puff into the lungs daily  Patient taking differently: Inhale 1 puff into the lungs every morning  19  Yes Routt Slim, APRN - CNP   albuterol sulfate  (90 Base) MCG/ACT inhaler Inhale 2 puffs into the lungs every 6 hours as needed for Wheezing or Shortness of Breath   Yes Historical Provider, MD   etodolac (LODINE) 500 MG tablet Take 500 mg by mouth every morning    Yes Historical Provider, MD   OXYGEN Inhale 3.5 L into the lungs continuous prn     Historical Provider, MD       Current medications:    Current Facility-Administered Medications   Medication Dose Route Frequency Provider Last Rate Last Dose    glucose (GLUTOSE) 40 % oral gel 15 g  15 g Oral PRN Elaine End Mihok, DO        dextrose 50 % IV solution  12.5 g Intravenous PRN Elaine End Mihok, DO        glucagon (rDNA) injection 1 mg  1 mg Intramuscular PRN Elaine End Mihok, DO        dextrose 5 % solution  100 mL/hr Intravenous PRN Elaine End Mihok, DO        insulin lispro (HUMALOG) injection vial 0-6 Units  0-6 Units Subcutaneous TID WC Jim Jaimes, DO   1 Units at 19    insulin lispro (HUMALOG) injection vial 0-3 Units  0-3 Units Subcutaneous Nightly Jim Jaimes, DO   1 Units at 06/10/19 1959    benzonatate (TESSALON) capsule 200 mg  200 mg Oral TID Elaine End Mihok, DO   200 mg at 19 0851    doxycycline hyclate (VIBRAMYCIN) capsule 100 mg  100 mg Oral 2 times per day Eben Madera MD   100 mg at 06/12/19 0851    ibuprofen (ADVIL;MOTRIN) tablet 200 mg  200 mg Oral TID  Jim Jaimes, DO   200 mg at 06/12/19 1215    0.9 % sodium chloride infusion   Intravenous Continuous Lucina Bachk,  mL/hr at 06/12/19 1215      budesonide (PULMICORT) nebulizer suspension 250 mcg  250 mcg Nebulization BID Lucina Berrios Mihok, DO   250 mcg at 06/12/19 0824    formoterol (PERFOROMIST) nebulizer solution 20 mcg  20 mcg Nebulization BID Lucina Agustina Moraleshok, DO   20 mcg at 06/12/19 0824    methylPREDNISolone sodium (SOLU-MEDROL) injection 80 mg  80 mg Intravenous Q8H Jim Jaimes, DO   80 mg at 06/12/19 0343    guaiFENesin-codeine (GUAIFENESIN AC) 100-10 MG/5ML liquid 5 mL  5 mL Oral Q4H PRN Lucina Moraleshok, DO   5 mL at 06/12/19 0851    ipratropium-albuterol (DUONEB) nebulizer solution 1 ampule  1 ampule Inhalation Q4H WA Jim Jaimes, DO   1 ampule at 06/12/19 1211    ondansetron (ZOFRAN) injection 4 mg  4 mg Intravenous Q6H PRN Lucina Agustina Moraleshok, DO        pantoprazole (PROTONIX) tablet 40 mg  40 mg Oral QAM  Jim Jaimes, DO   40 mg at 06/12/19 0626    acetaminophen (TYLENOL) tablet 650 mg  650 mg Oral Q4H PRN Lucina Agustina Moraleshok, DO        sodium chloride flush 0.9 % injection 10 mL  10 mL Intravenous BID Lucina Berrios Mihok, DO   10 mL at 06/12/19 0851    sodium chloride flush 0.9 % injection 10 mL  10 mL Intravenous PRN Mati Moraleshok, DO   10 mL at 06/11/19 1205       Allergies:  No Known Allergies    Problem List:    Patient Active Problem List   Diagnosis Code    Left upper lobe pneumonia ([facility] J18.1    COPD (chronic obstructive pulmonary disease) (HCC) J44.9    Acute and chronic respiratory failure with hypoxia ([facility]) J96.21    Closed displaced fracture of second metatarsal bone of right foot with routine healing S92.321D    COPD exacerbation ([facility]) J44.1    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    Right lower lobe pneumonia ([address] J18.1    Pulmonary emphysema with fibrosis of lung ([address] J43.9, J84.10    Thoracic ascending aortic aneurysm (HCC) I71.2    Pneumonia J18.9    Cough with hemoptysis R04.2    Pneumonia, bacterial J15.9    S/P lumbar fusion Z98.1    Sepsis (HCC) A41.9    Infection due to parainfluenza virus 3 B34.8    Hypophosphatemia E83.39    Glucose intolerance E74.39    Hilar adenopathy R59.0       Past Medical History:        Diagnosis Date    Acute and chronic respiratory failure with hypoxia ([facility]) 10/12/2017    Acute respiratory failure with hypoxia ([address] 2018    Bullous emphysema ([address] 2018    Colon cancer screening     COPD (chronic obstructive pulmonary disease) ([facility])     Cough with hemoptysis 2019    Glucose intolerance 6/10/2019    Hilar adenopathy 6/10/2019    Hypophosphatemia 6/10/2019    Infection due to parainfluenza virus 3 6/10/2019    Left upper lobe pneumonia ([address] 10/12/2017    Pulmonary emphysema with fibrosis of lung ([address] 11/15/2018    Right lower lobe pneumonia ([address] 11/15/2018    Recurrent 19    S/P lumbar fusion 2019    Thoracic ascending aortic aneurysm ([address] 11/15/2018    Proximal ascending aorta; 3.8 cm; 11/15/18       Past Surgical History:        Procedure Laterality Date    ABSCESS DRAINAGE  2006    X2 RECTAL ABSCESS    COLONOSCOPY  2013    LUMBAR FUSION  2019    [facility]       Social History:    Social History     Tobacco Use    Smoking status: Former Smoker     Packs/day: 2.00     Years: 46.00     Pack years: 92.00     Types: Cigarettes     Start date: 10/12/1968     Last attempt to quit: 10/12/2014     Years since quittin.6    Smokeless tobacco: Never Used   Substance Use Topics    Alcohol use:  No                                Counseling given: Not Answered      Vital Signs (Current):   Vitals:    19 1645 19 2342 19 0036 19 0745   BP: (!) 140/85 (!) 141/82  (!) 147/84   Pulse: 97 96  89 Resp: 18 16  16   Temp: 98 °F (36.7 °C) 97.7 °F (36.5 °C)  97.6 °F (36.4 °C)   TempSrc: Oral Oral  Oral   SpO2: 94% 94%  93%   Weight:   211 lb (95.7 kg)    Height:                                                  BP Readings from Last 3 Encounters:   06/12/19 (!) 147/84   06/03/19 136/74   05/28/19 127/79       NPO Status: Time of last liquid consumption: 0600(Sips with meds)                        Time of last solid consumption: 1800                        Date of last liquid consumption: 06/12/19                        Date of last solid food consumption: 06/11/19    BMI:   Wt Readings from Last 3 Encounters:   06/12/19 211 lb (95.7 kg)   06/02/19 213 lb (96.6 kg)   05/28/19 205 lb (93 kg)     Body mass index is 27.84 kg/m². CBC:   Lab Results   Component Value Date    WBC 11.1 06/12/2019    RBC 4.12 06/12/2019    HGB 12.2 06/12/2019    HCT 36.8 06/12/2019    MCV 89.3 06/12/2019    RDW 14.8 06/12/2019     06/12/2019       CMP:   Lab Results   Component Value Date     06/12/2019    K 4.0 06/12/2019    K 4.4 04/22/2019     06/12/2019    CO2 26 06/12/2019    BUN 9 06/12/2019    CREATININE 0.5 06/12/2019    GFRAA >60 06/12/2019    LABGLOM >60 06/12/2019    GLUCOSE 138 06/12/2019    PROT 6.5 05/30/2019    CALCIUM 9.3 06/12/2019    BILITOT 0.9 05/30/2019    ALKPHOS 73 05/30/2019    AST 12 05/30/2019    ALT 7 05/30/2019       POC Tests: No results for input(s): POCGLU, POCNA, POCK, POCCL, POCBUN, POCHEMO, POCHCT in the last 72 hours.     Coags:   Lab Results   Component Value Date    PROTIME 12.5 05/30/2019    INR 1.1 05/30/2019    APTT 31.4 05/30/2019       HCG (If Applicable): No results found for: PREGTESTUR, PREGSERUM, HCG, HCGQUANT     ABGs: No results found for: PHART, PO2ART, QPG6UYB, NHE9KII, BEART, T2SJFIVT     Type & Screen (If Applicable):  No results found for: STEPHANIA [facility]    Anesthesia Evaluation  Patient summary reviewed and Nursing notes reviewed no history of anesthetic complications:   Airway: Mallampati: II  TM distance: >3 FB   Neck ROM: full  Mouth opening: > = 3 FB Dental: normal exam         Pulmonary:   (+) pneumonia:  COPD:  rhonchi,  decreased breath sounds,                             Cardiovascular:Negative CV ROS            Rhythm: regular  Rate: normal                    Neuro/Psych:   Negative Neuro/Psych ROS              GI/Hepatic/Renal: Neg GI/Hepatic/Renal ROS            Endo/Other: Negative Endo/Other ROS                    Abdominal:           Vascular:           ROS comment: Thoracic aortic aneurysm. Anesthesia Plan      MAC     ASA 3       Induction: intravenous. Anesthetic plan and risks discussed with patient. Plan discussed with CRNA.                   Inez Marin MD   6/12/2019

## 2019-06-12 NOTE — OP NOTE
Bronchoscopy Procedure Note    Date of Operation: 6/12/2019    Pre-op Diagnosis: Left upper lobe pneumonia, persistent    Post-op Diagnosis: Same    Surgeon: Mazin Guzman    Assistant: None    EBL: None    Anesthesia: [facility]     Operation: Flexible fiberoptic bronchoscopy, diagnostic / therapeutic    Complications: None    Indications and History:  The patient is a 79 y.o. male with a dense left upper lobe pneumonia which despite treatment has not improved. The risks, benefits, complications, treatment options and expected outcomes were discussed with the patient. The possibilities of reaction to medication, pulmonary aspiration, perforation of a viscus(Pneumothorax), bleeding, respiratory failure and failure to diagnose a condition and creating a complication requiring transfusion or operation were discussed with the patient who freely signed the consent. Description of Procedure: The patient was taken to the endoscopy suite, identified as Liz Alessia and the procedure verified as Flexible Fiberoptic Bronchoscopy. A Time Out was held and the above information confirmed. After the induction of topical nasopharyngeal anesthesia, the patient was positioned supine and the bronchoscope was passed through the oropharynx . The vocal cords were visualized, and 1% lidocaine was topically placed onto the cords. The cords were normal. The scope was then passed into the trachea. Additional 1% lidocaine was used topically within the airway. Careful inspection of the tracheal lumen was accomplished. The scope was sequentially passed into all airways. Endobronchial findings:     Once the trachea was intubated the right left lungs were inspected. Scant secretions were noted throughout the right tracheobronchial tree. No endobronchial lesions were identified in the right lung. The left lung was then selectively intubated.   Scattered purulent secretions were noted up until the left upper lobe where significant purulent secretions were noted draining specifically from the anterior segment of the left upper lobe. This was aggressively washed and suctioned. Return was bloody and purulent. Labs were sent for culture and sensitivity, fungal studies, AFB and galactomannan's. Cytology is ordered as well. A protected brush was passed into the left upper lobe as well. The Patient was taken to the Endoscopy Recovery area in satisfactory condition.         Dar Sepulveda    [facility]    [facility], [address] [place] [address], [address] [place] [address], [facility]

## 2019-06-12 NOTE — PROGRESS NOTES
[facility] Quality Flow/Interdisciplinary Rounds Progress Note        Quality Flow Rounds held on June 12, 2019    Disciplines Attending:  Bedside Nurse, ,  and Nursing Unit Leadership    Cathrine Siemens was admitted on 6/9/2019 11:17 AM    Anticipated Discharge Date:  Expected Discharge Date: 06/28/19    Disposition:    Sandro Score:  Sandro Scale Score: 22    Readmission Risk              Risk of Unplanned Readmission:        14           Discussed patient goal for the day, patient clinical progression, and barriers to discharge.   The following Goal(s) of the Day/Commitment(s) have been identified:  Bronchoscopy today, IV steroids, check Pulmonary plan      Lizett Phelan  June 12, 2019

## 2019-06-12 NOTE — PROGRESS NOTES
antigen titers are negative; blood cultures negative to date. 6/12/2019-patient laying quietly in bed; still has significant cough but the cough medication to help somewhat. No chest pain no significant dyspnea; does have mild sore throat. Bowels and urine functioning normally. Patient was followed up by pulmonary yesterday; scheduled for bronchoscopy today. Temperature 97.6 respirations 16 pulse 89 blood pressure 147/84. Nasal cannula 5 L/min 93% saturation. Sodium 141 potassium 4.0 chloride 103 CO2 26 BUN 9 creatinine 0.5 magnesium 1.9 glucose 138 calcium 9.3 phosphorus 2.5 hemoglobin 12.2 WBC 11.1 platelets 939. Culture results Gram stain etc. all unchanged. ROS:  Negative to a 10 system review except that mentioned in the HPI. Objective:     PHYSICAL EXAM:    VS: BP (!) 147/84   Pulse 89   Temp 97.6 °F (36.4 °C) (Oral)   Resp 16   Ht 6' 1\" (1.854 m)   Wt 211 lb (95.7 kg)   SpO2 93%   BMI 27.84 kg/m²   General appearance: Alert, Awake, Oriented times 3, no distress  Skin: Warm and dry ; no rashes  Head: Normocephalic. No masses, lesions or tenderness noted  Eyes: Conjunctivae pink, sclera white. PERRL,EOM-I  Ears: External ears normal  Nose/Sinuses: Nares normal. Septum midline. Mucosa normal. No drainage  Oropharynx: Oropharynx clear with no exudate seen  Neck: Supple. No jugular venous distension, lymphadenopathy or thyromegaly Trachea midline  Lungs: Decreased excursion; minimal rhonchi left upper lung  Heart: S1 S2  Regular rate and rhythm. No rub, murmur or gallop  Abdomen: Soft, non-tender. BS normal. No masses, organomegaly; no rebound or guarding  Extremities: No edema, Peripheral pulses palpable  Musculoskeletal: Muscular strength appears intact. Neuro:  No focal motor defects ; II-XII grossly intact .  GLASER equally    TELEMETRY: REVIEWED--Telemetry: Sinus    ASSESSMENT:   Principal Problem:    Acute and chronic respiratory failure with hypoxia (HCC)  Active Problems:    COPD (chronic obstructive pulmonary disease) (HCC)    Bullous emphysema (HCC)    Pulmonary emphysema with fibrosis of lung ([facility]    Thoracic ascending aortic aneurysm (HCC)    Sepsis ([facility]    Infection due to parainfluenza virus 3    Hypophosphatemia    Glucose intolerance    Hilar adenopathy  Resolved Problems:    Respiratory failure, acute-on-chronic (HCC)      PLAN:  SEE ORDERS      RE  CHANGES AND FINDINGS   Medications reviewed with patient  GI prophylaxis  DVT prophylaxis  Consultants notes reviewed   Monitor labs  Doxycycline has been started  Sliding scale insulin  Methylprednisolone 80 mg every 8 hours IV  Potassium phosphate 20 mmol IV today  Continue aerosol treatments  Bronchoscopy if okay with others  Monitor labs  Bronchoscopy this afternoon  Sliding scale insulin  Doxycycline 100 mg every 12 hours by mouth  Continue aerosols  Methylprednisolone 80 mg IV every 8 hours            Discussed with patient and nursing.       Parminder Plant Fiona  DO     10:38 AM     6/12/2019    TIME > 35 MINUTES    >  50 %  OF  TIME  DISCUSSION     Active Hospital Problems    Diagnosis    Infection due to parainfluenza virus 3 [B34.8]    Hypophosphatemia [E83.39]    Glucose intolerance [E74.39]    Hilar adenopathy [R59.0]    Sepsis ([facility] [A41.9]    Pulmonary emphysema with fibrosis of lung ([facility] [J43.9, J84.10]    Thoracic ascending aortic aneurysm (HCC) [I71.2]    Bullous emphysema ([facility] [J43.9]    COPD (chronic obstructive pulmonary disease) (HCC) [J44.9]    Acute and chronic respiratory failure with hypoxia ([facility]) [J96.21]                 ------------  INFORMATION  -----------      DIET:Diet NPO, After Midnight Exceptions are: Sips with Meds      No Known Allergies      MEDICATION SIDE EFFECTS:none       SCHEDULED MEDS:                                 Current Facility-Administered Medications   Medication Dose Route Frequency Provider Last Rate Last Dose    glucose (GLUTOSE) 40 % oral gel 15 g  15 g Oral PRN Lucina Moraleshok, DO        dextrose 50 % IV solution  12.5 g Intravenous PRN Lucina Berrios Mihok, DO        glucagon (rDNA) injection 1 mg  1 mg Intramuscular PRN Lucina Moraleshok, DO        dextrose 5 % solution  100 mL/hr Intravenous PRN Lucina Moraleshok, DO        insulin lispro (HUMALOG) injection vial 0-6 Units  0-6 Units Subcutaneous TID  Jim oMraleseder, DO   1 Units at 06/11/19 1651    insulin lispro (HUMALOG) injection vial 0-3 Units  0-3 Units Subcutaneous Nightly Jim Moraleseder, DO   1 Units at 06/10/19 1959    benzonatate (TESSALON) capsule 200 mg  200 mg Oral TID [facility] Agustina Moraleseder, DO   200 mg at 06/12/19 0851    doxycycline hyclate (VIBRAMYCIN) capsule 100 mg  100 mg Oral 2 times per day Eben Madera MD   100 mg at 06/12/19 0851    ibuprofen (ADVIL;MOTRIN) tablet 200 mg  200 mg Oral TID  Jim FRY Fiona, DO   200 mg at 06/12/19 0851    0.9 % sodium chloride infusion   Intravenous Continuous Jim Bachjaycob,  mL/hr at 06/11/19 0530      budesonide (PULMICORT) nebulizer suspension 250 mcg  250 mcg Nebulization BID [facility] Agustina Moralesjaycob, DO   250 mcg at 06/12/19 0824    formoterol (PERFOROMIST) nebulizer solution 20 mcg  20 mcg Nebulization BID [facility] Almajaycob, DO   20 mcg at 06/12/19 0824    methylPREDNISolone sodium (SOLU-MEDROL) injection 80 mg  80 mg Intravenous Q8H Jim Moraelseder, DO   80 mg at 06/12/19 0343    guaiFENesin-codeine (GUAIFENESIN AC) 100-10 MG/5ML liquid 5 mL  5 mL Oral Q4H PRN Lucina Agustina Moraleseder, DO   5 mL at 06/12/19 0851    ipratropium-albuterol (DUONEB) nebulizer solution 1 ampule  1 ampule Inhalation Q4H WA Jim FRY Fiona, DO   1 ampule at 06/12/19 0824    ondansetron (ZOFRAN) injection 4 mg  4 mg Intravenous Q6H PRN Lucina Bachk, DO        pantoprazole (PROTONIX) tablet 40 mg  40 mg Oral QAM AC Jim Jaimes, DO   40 mg at 06/12/19 0626    acetaminophen (TYLENOL) tablet 650 mg  650 mg Oral Q4H PRN Jim Jaimes, DO        sodium chloride flush 0.9 % injection 10 mL  10 mL Intravenous BID Rudolm Damián Mihok, DO   10 mL at 19 0851    sodium chloride flush 0.9 % injection 10 mL  10 mL Intravenous PRN Melani Jaimes, DO   10 mL at 19 1205     Scheduled Meds:   insulin lispro  0-6 Units Subcutaneous TID WC    insulin lispro  0-3 Units Subcutaneous Nightly    benzonatate  200 mg Oral TID    doxycycline hyclate  100 mg Oral 2 times per day    ibuprofen  200 mg Oral TID WC    budesonide  250 mcg Nebulization BID    formoterol  20 mcg Nebulization BID    methylPREDNISolone  80 mg Intravenous Q8H    ipratropium-albuterol  1 ampule Inhalation Q4H WA    pantoprazole  40 mg Oral QAM AC    sodium chloride flush  10 mL Intravenous BID       Continuous Infusions:   dextrose      sodium chloride 100 mL/hr at 19 0530         Data:   Temperature:  Current - Temp: 97.6 °F (36.4 °C); Max - Temp  Av.8 °F (36.6 °C)  Min: 97.6 °F (36.4 °C)  Max: 98 °F (36.7 °C)    Respiratory Rate : Resp  Av.7  Min: 16  Max: 18    Pulse Range: Pulse  Av  Min: 89  Max: 97    Blood Presuure Range:  Systolic (03VOD), LKB:376 , Min:140 , MCCALLUM:480   ; Diastolic (65AEE), AOH:89, Min:82, Max:85      Pulse ox Range: SpO2  Av.7 %  Min: 93 %  Max: 94 %    Patient Vitals for the past 8 hrs:   BP Temp Temp src Pulse Resp SpO2   19 0745 (!) 147/84 97.6 °F (36.4 °C) Oral 89 16 93 %         Intake/Output Summary (Last 24 hours) at 2019 1038  Last data filed at 2019 6505  Gross per 24 hour   Intake 2960 ml   Output 2525 ml   Net 435 ml       I/O last 3 completed shifts: In: 3140 [P.O.:680; I.V.:2460]  Out: 3000 [Urine:3000]    No intake/output data recorded.     Wt Readings from Last 3 Encounters:   19 211 lb (95.7 kg)   19 213 lb (96.6 kg)   19 205 lb (93 kg)       Labs:   CBC:   Lab Results   Component Value Date    WBC 11.1 2019    RBC 4.12 2019    HGB 12.2 2019    HCT 36.8 2019    MCV 89.3 2019    MCH 29.6 06/12/2019    MCHC 33.2 06/12/2019    RDW 14.8 06/12/2019     06/12/2019    MPV 9.2 06/12/2019     CBC with Differential:    Lab Results   Component Value Date    WBC 11.1 06/12/2019    RBC 4.12 06/12/2019    HGB 12.2 06/12/2019    HCT 36.8 06/12/2019     06/12/2019    MCV 89.3 06/12/2019    MCH 29.6 06/12/2019    MCHC 33.2 06/12/2019    RDW 14.8 06/12/2019    LYMPHOPCT 3.1 06/12/2019    MONOPCT 2.1 06/12/2019    BASOPCT 0.0 06/12/2019    MONOSABS 0.23 06/12/2019    LYMPHSABS 0.34 06/12/2019    EOSABS 0.00 06/12/2019    BASOSABS 0.00 06/12/2019     Hemoglobin/Hematocrit:    Lab Results   Component Value Date    HGB 12.2 06/12/2019    HCT 36.8 06/12/2019     CMP:    Lab Results   Component Value Date     06/12/2019    K 4.0 06/12/2019    K 4.4 04/22/2019     06/12/2019    CO2 26 06/12/2019    BUN 9 06/12/2019    CREATININE 0.5 06/12/2019    GFRAA >60 06/12/2019    LABGLOM >60 06/12/2019    GLUCOSE 138 06/12/2019    PROT 6.5 05/30/2019    LABALBU 3.6 05/30/2019    CALCIUM 9.3 06/12/2019    BILITOT 0.9 05/30/2019    ALKPHOS 73 05/30/2019    AST 12 05/30/2019    ALT 7 05/30/2019     BMP:    Lab Results   Component Value Date     06/12/2019    K 4.0 06/12/2019    K 4.4 04/22/2019     06/12/2019    CO2 26 06/12/2019    BUN 9 06/12/2019    LABALBU 3.6 05/30/2019    CREATININE 0.5 06/12/2019    CALCIUM 9.3 06/12/2019    GFRAA >60 06/12/2019    LABGLOM >60 06/12/2019    GLUCOSE 138 06/12/2019     Hepatic Function Panel:    Lab Results   Component Value Date    ALKPHOS 73 05/30/2019    ALT 7 05/30/2019    AST 12 05/30/2019    PROT 6.5 05/30/2019    BILITOT 0.9 05/30/2019    LABALBU 3.6 05/30/2019     Magnesium:    Lab Results   Component Value Date    MG 1.9 06/12/2019     Phosphorus:    Lab Results   Component Value Date    PHOS 2.5 06/12/2019     Uric Acid:    Lab Results   Component Value Date    LABURIC 3.5 05/31/2019     PT/INR:    Lab Results   Component Value Date    PROTIME 12.5 05/30/2019    INR 1.1 05/30/2019     Warfarin PT/INR:  No components found for: Delta Dexter  PTT:    Lab Results   Component Value Date    APTT 31.4 05/30/2019   [APTT}  Troponin:    Lab Results   Component Value Date    TROPONINI <0.01 06/09/2019     Last 3 Troponin:    Lab Results   Component Value Date    TROPONINI <0.01 06/09/2019    TROPONINI <0.01 05/31/2019    TROPONINI <0.01 05/28/2019     U/A:    Lab Results   Component Value Date    COLORU Yellow 06/09/2019    PROTEINU Negative 06/09/2019    PHUR 6.0 06/09/2019    LABCAST MANY 05/30/2019    WBCUA NONE 06/09/2019    RBCUA NONE 06/09/2019    MUCUS Present 04/22/2019    BACTERIA NONE 06/09/2019    CLARITYU Clear 06/09/2019    SPECGRAV 1.010 06/09/2019    LEUKOCYTESUR Negative 06/09/2019    UROBILINOGEN 0.2 06/09/2019    BILIRUBINUR LARGE 06/09/2019    BLOODU Negative 06/09/2019    GLUCOSEU Negative 06/09/2019     ABG:    Lab Results   Component Value Date    PH 7.440 05/28/2019    PCO2 32.1 05/28/2019    PO2 59.1 05/28/2019    HCO3 21.3 05/28/2019    BE -1.8 05/28/2019    O2SAT 91.6 05/28/2019     HgBA1c:    Lab Results   Component Value Date    LABA1C 5.4 06/09/2019     FLP:    Lab Results   Component Value Date    TRIG 35 06/10/2019    HDL 44 06/10/2019    LDLCALC 60 06/10/2019    LABVLDL 7 06/10/2019     TSH:    Lab Results   Component Value Date    TSH 0.489 06/10/2019     VITAMIN B12: No components found for: B12  FOLATE:    Lab Results   Component Value Date    FOLATE 11.4 06/09/2019        CBC:  Recent Labs     06/10/19  0320 06/11/19  0356 06/12/19  0349   WBC 7.9 15.0* 11.1   RBC 4.16 4.15 4.12   HGB 12.3* 12.4* 12.2*   HCT 36.8* 37.1 36.8*    264 291   MCV 88.5 89.4 89.3   MCH 29.6 29.9 29.6   MCHC 33.4 33.4 33.2   RDW 14.7 15.0 14.8   LYMPHOPCT 3.6* 1.8* 3.1*   MONOPCT 1.1* 2.1 2.1   BASOPCT 0.1 0.1 0.0   MONOSABS 0.09* 0.31 0.23   LYMPHSABS 0.28* 0.27* 0.34*   EOSABS 0.00* 0.00* 0.00*   BASOSABS 0.01 0.02 0.00          H & H

## 2019-06-12 NOTE — ANESTHESIA POSTPROCEDURE EVALUATION
Department of Anesthesiology  Postprocedure Note    Patient: Shahram Farnsworth  MRN: [id number]  YOB: 1950  Date of evaluation: 6/12/2019  Time:  2:28 PM     Procedure Summary     Date:  06/12/19 Room / Location:  [facility] 03 / [facility] ENDOSCOPY    Anesthesia Start:  1320 Anesthesia Stop:  7657    Procedure:  BRONCHOSCOPY (N/A ) Diagnosis:  (-)    Surgeon:  Tom Saab MD Responsible Provider:  Oscar Sears MD    Anesthesia Type:  MAC ASA Status:  3          Anesthesia Type: MAC    Moon Phase I:      Moon Phase II: Moon Score: 9    Last vitals: Reviewed and per EMR flowsheets.        Anesthesia Post Evaluation    Patient location during evaluation: PACU  Patient participation: complete - patient participated  Level of consciousness: awake and alert  Pain score: 0  Airway patency: patent  Nausea & Vomiting: no vomiting and no nausea  Complications: no  Cardiovascular status: hemodynamically stable  Respiratory status: spontaneous ventilation  Hydration status: stable

## 2019-06-13 LAB
ANION GAP SERPL CALCULATED.3IONS-SCNC: 13 MMOL/L (ref 7–16)
ANISOCYTOSIS: ABNORMAL
BASOPHILS ABSOLUTE: 0.01 E9/L (ref 0–0.2)
BASOPHILS RELATIVE PERCENT: 0.1 % (ref 0–2)
BUN BLDV-MCNC: 12 MG/DL (ref 8–23)
CALCIUM SERPL-MCNC: 9.2 MG/DL (ref 8.6–10.2)
CHLORIDE BLD-SCNC: 102 MMOL/L (ref 98–107)
CO2: 27 MMOL/L (ref 22–29)
CREAT SERPL-MCNC: 0.6 MG/DL (ref 0.7–1.2)
EOSINOPHILS ABSOLUTE: 0 E9/L (ref 0.05–0.5)
EOSINOPHILS RELATIVE PERCENT: 0 % (ref 0–6)
GFR AFRICAN AMERICAN: >60
GFR NON-AFRICAN AMERICAN: >60 ML/MIN/1.73
GLUCOSE BLD-MCNC: 123 MG/DL (ref 74–99)
GRAM STAIN ORDERABLE: NORMAL
GRAM STAIN ORDERABLE: NORMAL
HCT VFR BLD CALC: 37.6 % (ref 37–54)
HEMOGLOBIN: 12.3 G/DL (ref 12.5–16.5)
IMMATURE GRANULOCYTES #: 0.13 E9/L
IMMATURE GRANULOCYTES %: 1.3 % (ref 0–5)
LYMPHOCYTES ABSOLUTE: 0.41 E9/L (ref 1.5–4)
LYMPHOCYTES RELATIVE PERCENT: 4.1 % (ref 20–42)
MAGNESIUM: 1.9 MG/DL (ref 1.6–2.6)
MCH RBC QN AUTO: 29.2 PG (ref 26–35)
MCHC RBC AUTO-ENTMCNC: 32.7 % (ref 32–34.5)
MCV RBC AUTO: 89.3 FL (ref 80–99.9)
METER GLUCOSE: 105 MG/DL (ref 74–99)
METER GLUCOSE: 122 MG/DL (ref 74–99)
METER GLUCOSE: 129 MG/DL (ref 74–99)
METER GLUCOSE: 186 MG/DL (ref 74–99)
MONOCYTES ABSOLUTE: 0.17 E9/L (ref 0.1–0.95)
MONOCYTES RELATIVE PERCENT: 1.7 % (ref 2–12)
NEUTROPHILS ABSOLUTE: 9.36 E9/L (ref 1.8–7.3)
NEUTROPHILS RELATIVE PERCENT: 92.8 % (ref 43–80)
PDW BLD-RTO: 14.8 FL (ref 11.5–15)
PHOSPHORUS: 2.9 MG/DL (ref 2.5–4.5)
PLATELET # BLD: 300 E9/L (ref 130–450)
PMV BLD AUTO: 8.9 FL (ref 7–12)
POTASSIUM SERPL-SCNC: 4.2 MMOL/L (ref 3.5–5)
RBC # BLD: 4.21 E12/L (ref 3.8–5.8)
SODIUM BLD-SCNC: 142 MMOL/L (ref 132–146)
WBC # BLD: 10.1 E9/L (ref 4.5–11.5)

## 2019-06-13 PROCEDURE — 6370000000 HC RX 637 (ALT 250 FOR IP): Performed by: INTERNAL MEDICINE

## 2019-06-13 PROCEDURE — 82962 GLUCOSE BLOOD TEST: CPT

## 2019-06-13 PROCEDURE — 6360000002 HC RX W HCPCS: Performed by: INTERNAL MEDICINE

## 2019-06-13 PROCEDURE — 83735 ASSAY OF MAGNESIUM: CPT

## 2019-06-13 PROCEDURE — 2060000000 HC ICU INTERMEDIATE R&B

## 2019-06-13 PROCEDURE — 80048 BASIC METABOLIC PNL TOTAL CA: CPT

## 2019-06-13 PROCEDURE — 36415 COLL VENOUS BLD VENIPUNCTURE: CPT

## 2019-06-13 PROCEDURE — 2700000000 HC OXYGEN THERAPY PER DAY

## 2019-06-13 PROCEDURE — 85025 COMPLETE CBC W/AUTO DIFF WBC: CPT

## 2019-06-13 PROCEDURE — 6370000000 HC RX 637 (ALT 250 FOR IP): Performed by: SPECIALIST

## 2019-06-13 PROCEDURE — 94640 AIRWAY INHALATION TREATMENT: CPT

## 2019-06-13 PROCEDURE — 2580000003 HC RX 258: Performed by: INTERNAL MEDICINE

## 2019-06-13 PROCEDURE — 84100 ASSAY OF PHOSPHORUS: CPT

## 2019-06-13 RX ORDER — DIMETHICONE, OXYBENZONE, AND PADIMATE O 2; 2.5; 6.6 G/100G; G/100G; G/100G
STICK TOPICAL PRN
Status: DISCONTINUED | OUTPATIENT
Start: 2019-06-13 | End: 2019-06-13

## 2019-06-13 RX ORDER — METHYLPREDNISOLONE SODIUM SUCCINATE 40 MG/ML
40 INJECTION, POWDER, LYOPHILIZED, FOR SOLUTION INTRAMUSCULAR; INTRAVENOUS EVERY 8 HOURS
Status: DISCONTINUED | OUTPATIENT
Start: 2019-06-13 | End: 2019-06-14

## 2019-06-13 RX ADMIN — IBUPROFEN 200 MG: 200 TABLET, FILM COATED ORAL at 08:43

## 2019-06-13 RX ADMIN — GUAIFENESIN AND CODEINE PHOSPHATE 5 ML: 10; 100 LIQUID ORAL at 08:48

## 2019-06-13 RX ADMIN — IBUPROFEN 200 MG: 200 TABLET, FILM COATED ORAL at 12:10

## 2019-06-13 RX ADMIN — BENZONATATE 200 MG: 100 CAPSULE ORAL at 14:39

## 2019-06-13 RX ADMIN — PANTOPRAZOLE SODIUM 40 MG: 40 TABLET, DELAYED RELEASE ORAL at 06:46

## 2019-06-13 RX ADMIN — BENZONATATE 200 MG: 100 CAPSULE ORAL at 21:58

## 2019-06-13 RX ADMIN — METHYLPREDNISOLONE SODIUM SUCCINATE 40 MG: 40 INJECTION, POWDER, FOR SOLUTION INTRAMUSCULAR; INTRAVENOUS at 21:59

## 2019-06-13 RX ADMIN — FORMOTEROL FUMARATE DIHYDRATE 20 MCG: 20 SOLUTION RESPIRATORY (INHALATION) at 20:37

## 2019-06-13 RX ADMIN — ACETAMINOPHEN 650 MG: 325 TABLET ORAL at 00:45

## 2019-06-13 RX ADMIN — IPRATROPIUM BROMIDE AND ALBUTEROL SULFATE 1 AMPULE: .5; 3 SOLUTION RESPIRATORY (INHALATION) at 12:57

## 2019-06-13 RX ADMIN — GUAIFENESIN AND CODEINE PHOSPHATE 5 ML: 10; 100 LIQUID ORAL at 21:58

## 2019-06-13 RX ADMIN — METHYLPREDNISOLONE SODIUM SUCCINATE 80 MG: 125 INJECTION, POWDER, LYOPHILIZED, FOR SOLUTION INTRAMUSCULAR; INTRAVENOUS at 04:06

## 2019-06-13 RX ADMIN — BENZONATATE 200 MG: 100 CAPSULE ORAL at 08:47

## 2019-06-13 RX ADMIN — ACETAMINOPHEN 650 MG: 325 TABLET ORAL at 23:53

## 2019-06-13 RX ADMIN — IBUPROFEN 200 MG: 200 TABLET, FILM COATED ORAL at 16:49

## 2019-06-13 RX ADMIN — IPRATROPIUM BROMIDE AND ALBUTEROL SULFATE 1 AMPULE: .5; 3 SOLUTION RESPIRATORY (INHALATION) at 09:42

## 2019-06-13 RX ADMIN — BUDESONIDE 250 MCG: 0.25 SUSPENSION RESPIRATORY (INHALATION) at 09:42

## 2019-06-13 RX ADMIN — FORMOTEROL FUMARATE DIHYDRATE 20 MCG: 20 SOLUTION RESPIRATORY (INHALATION) at 09:42

## 2019-06-13 RX ADMIN — BUDESONIDE 250 MCG: 0.25 SUSPENSION RESPIRATORY (INHALATION) at 20:38

## 2019-06-13 RX ADMIN — DOXYCYCLINE HYCLATE 100 MG: 100 CAPSULE ORAL at 21:58

## 2019-06-13 RX ADMIN — METHYLPREDNISOLONE SODIUM SUCCINATE 80 MG: 125 INJECTION, POWDER, LYOPHILIZED, FOR SOLUTION INTRAMUSCULAR; INTRAVENOUS at 12:10

## 2019-06-13 RX ADMIN — DOXYCYCLINE HYCLATE 100 MG: 100 CAPSULE ORAL at 08:47

## 2019-06-13 RX ADMIN — SODIUM CHLORIDE: 9 INJECTION, SOLUTION INTRAVENOUS at 23:53

## 2019-06-13 RX ADMIN — IPRATROPIUM BROMIDE AND ALBUTEROL SULFATE 1 AMPULE: .5; 3 SOLUTION RESPIRATORY (INHALATION) at 20:38

## 2019-06-13 RX ADMIN — INSULIN LISPRO 1 UNITS: 100 INJECTION, SOLUTION INTRAVENOUS; SUBCUTANEOUS at 12:09

## 2019-06-13 RX ADMIN — IPRATROPIUM BROMIDE AND ALBUTEROL SULFATE 1 AMPULE: .5; 3 SOLUTION RESPIRATORY (INHALATION) at 17:04

## 2019-06-13 RX ADMIN — Medication 10 ML: at 12:10

## 2019-06-13 RX ADMIN — GUAIFENESIN AND CODEINE PHOSPHATE 5 ML: 10; 100 LIQUID ORAL at 14:37

## 2019-06-13 RX ADMIN — GUAIFENESIN AND CODEINE PHOSPHATE 5 ML: 10; 100 LIQUID ORAL at 00:45

## 2019-06-13 RX ADMIN — SODIUM CHLORIDE: 9 INJECTION, SOLUTION INTRAVENOUS at 14:39

## 2019-06-13 ASSESSMENT — PAIN - FUNCTIONAL ASSESSMENT: PAIN_FUNCTIONAL_ASSESSMENT: ACTIVITIES ARE NOT PREVENTED

## 2019-06-13 ASSESSMENT — PAIN SCALES - GENERAL
PAINLEVEL_OUTOF10: 0
PAINLEVEL_OUTOF10: 0
PAINLEVEL_OUTOF10: 2
PAINLEVEL_OUTOF10: 0
PAINLEVEL_OUTOF10: 0
PAINLEVEL_OUTOF10: 2
PAINLEVEL_OUTOF10: 0

## 2019-06-13 ASSESSMENT — PAIN DESCRIPTION - ONSET
ONSET: ON-GOING
ONSET: ON-GOING

## 2019-06-13 ASSESSMENT — PAIN DESCRIPTION - PROGRESSION
CLINICAL_PROGRESSION: NOT CHANGED
CLINICAL_PROGRESSION: NOT CHANGED

## 2019-06-13 ASSESSMENT — PAIN DESCRIPTION - FREQUENCY
FREQUENCY: CONTINUOUS
FREQUENCY: CONTINUOUS

## 2019-06-13 ASSESSMENT — PAIN DESCRIPTION - DESCRIPTORS
DESCRIPTORS: ACHING;SORE
DESCRIPTORS: ACHING;DISCOMFORT;DULL

## 2019-06-13 ASSESSMENT — PAIN DESCRIPTION - LOCATION
LOCATION: GENERALIZED
LOCATION: GENERALIZED

## 2019-06-13 ASSESSMENT — PAIN DESCRIPTION - PAIN TYPE
TYPE: ACUTE PAIN
TYPE: ACUTE PAIN

## 2019-06-13 ASSESSMENT — PAIN DESCRIPTION - ORIENTATION: ORIENTATION: RIGHT;LEFT;LOWER;MID

## 2019-06-13 NOTE — PLAN OF CARE
Problem: Pain:  Goal: Pain level will decrease  Description  Pain level will decrease  Outcome: Met This Shift  Goal: Control of acute pain  Description  Control of acute pain  Outcome: Met This Shift     Problem: Breathing Pattern - Ineffective:  Goal: Ability to achieve and maintain a regular respiratory rate will improve  Description  Ability to achieve and maintain a regular respiratory rate will improve  Outcome: Met This Shift     Problem: Airway Clearance - Ineffective:  Goal: Ability to maintain a clear airway will improve  Description  Ability to maintain a clear airway will improve  Outcome: Met This Shift     Problem: Discharge Planning:  Goal: Discharged to appropriate level of care  Description  Discharged to appropriate level of care  Outcome: Met This Shift     Problem: Airway Clearance - Ineffective:  Goal: Clear lung sounds  Description  Clear lung sounds  Outcome: Met This Shift     Problem: Fluid Volume - Deficit:  Goal: Achieves intake and output within specified parameters  Description  Achieves intake and output within specified parameters  Outcome: Met This Shift     Problem: Gas Exchange - Impaired:  Goal: Levels of oxygenation will improve  Description  Levels of oxygenation will improve  Outcome: Met This Shift     Problem: Hyperthermia:  Goal: Ability to maintain a body temperature in the normal range will improve  Description  Ability to maintain a body temperature in the normal range will improve  Outcome: Met This Shift     Problem: Tobacco Use:  Goal: Will participate in inpatient tobacco-use cessation counseling  Description  Will participate in inpatient tobacco-use cessation counseling  Outcome: Met This Shift     Problem: Respiratory  Goal: No pulmonary complications  Outcome: Met This Shift

## 2019-06-13 NOTE — PROGRESS NOTES
PROGRESS  NOTE --                                                          INTERNAL  MEDICINE                                                                              I  PERSONALLY SAW , EXAMINED, AND CARED [address], 6/13/2019     LABS, XRAY ,CHART, AND MEDICATIONS  REVIEWED BY ME .      PATIENT PROBLEM LIST:  Principal Problem:    Acute and chronic respiratory failure with hypoxia (HCC)  Active Problems:    COPD (chronic obstructive pulmonary disease) ([address]    Bullous emphysema (HCC)    Pulmonary emphysema with fibrosis of lung ([address]    Thoracic ascending aortic aneurysm ([address]    Sepsis ([address]-- sepsis ruled out after studies; sepsis was an ED diagnosis not mine    Infection due to parainfluenza virus 3    Hypophosphatemia    Glucose intolerance    Hilar adenopathy  Resolved Problems:    Respiratory failure, acute-on-chronic ([address]         6/11/2019-SUBJECTIVE: Beauty Boas is alert awake and cooperative; oriented ×3. Denies any chest pain dyspnea nausea emesis. Tolerating diet. No abdominal pain. He was seen yesterday by pulmonary as well as infectious disease; those notes reviewed by myself and with the patient. I agree, bronchoscopy should be performed. I also agree unlikely malignancy in view of rapid increase in size of the lesion. In addition patient is immunocompromised, may have unusual infection. Temperature 98.3 respirations 18 pulse 92 blood pressure 146/8697% on 6 L nasal cannula. Sodium 138 potassium 4.2 chloride 103 CO2 23 BUN 9 creatinine 0.5 magnesium 1.9 glucose 159 calcium 9.3 phosphorus 2.1 WBC 15.0 hemoglobin 12.4 platelets 294.   Sputum Gram stain as follows--  Gram Stain Orderable 06/10/2019  5:30 AM  - [facility]   Group 6: <25 PMN's/LPF and <25 Epithelial cells/LPF   Rare Polymorphonuclear leukocytes   Few Epithelial cells   Rare gram positive rods Diphtheroid-like   Rare Gram negative rods   Rare Gram positive cocci in pairs     Legionella and strep antigen titers are negative; blood cultures negative to date. 6/12/2019-patient laying quietly in bed; still has significant cough but the cough medication to help somewhat. No chest pain no significant dyspnea; does have mild sore throat. Bowels and urine functioning normally. Patient was followed up by pulmonary yesterday; scheduled for bronchoscopy today. Temperature 97.6 respirations 16 pulse 89 blood pressure 147/84. Nasal cannula 5 L/min 93% saturation. Sodium 141 potassium 4.0 chloride 103 CO2 26 BUN 9 creatinine 0.5 magnesium 1.9 glucose 138 calcium 9.3 phosphorus 2.5 hemoglobin 12.2 WBC 11.1 platelets 334. Culture results Gram stain etc. all unchanged. 6/13/2019-patient sitting quietly in bed no complaints voiced. He did have some hemoptysis following bronchoscopy but it was scant. No chest pain dyspnea unchanged. He has no dyspnea walking in his room. Questions regarding his wife who has similar illness; she was referred to the ED, given Zithromax and slightly better. I advised him she should do much better, she is not immunocompromised. Bronchoscopy was completed yesterday with the following results--             Endobronchial findings:      Once the trachea was intubated the right left lungs were inspected. Scant secretions were noted throughout the right tracheobronchial tree.     No endobronchial lesions were identified in the right lung.     The left lung was then selectively intubated. Scattered purulent secretions were noted up until the left upper lobe where significant purulent secretions were noted draining specifically from the anterior segment of the left upper lobe. This was aggressively washed and suctioned. Return was bloody and purulent.     Labs were sent for culture and sensitivity, fungal studies, AFB and galactomannan's. Cytology is ordered as well.   A protected brush was passed into the left upper lobe as well. Temperature 97.9 respirations 18 pulse 86 blood pressure 146/88. Nasal cannula 4 L with 91% saturation. Sodium 142 potassium 4.2 chloride 102 CO2 27 BUN 12 creatinine 0.6 magnesium 1.9 glucose 123 calcium 9.2 phosphorus 2.9 most recent glucose 186. Hemoglobin 12.3 WBC 10.1 platelets 820,925. Cultures remain unchanged Gram stain unchanged. ROS:  Negative to a 10 system review except that mentioned in the HPI. Objective:     PHYSICAL EXAM:    VS: BP (!) 146/88   Pulse 86   Temp 97.9 °F (36.6 °C) (Oral)   Resp 18   Ht 6' 1\" (1.854 m)   Wt 219 lb 11.2 oz (99.7 kg)   SpO2 91%   BMI 28.99 kg/m²   General appearance: Alert, Awake, Oriented times 3, no distress  Skin: Warm and dry ; no rashes  Head: Normocephalic. No masses, lesions or tenderness noted  Eyes: Conjunctivae pink, sclera white. PERRL,EOM-I  Ears: External ears normal  Nose/Sinuses: Nares normal. Septum midline. Mucosa normal. No drainage  Oropharynx: Oropharynx clear with no exudate seen  Neck: Supple. No jugular venous distension, lymphadenopathy or thyromegaly Trachea midline  Lungs: Decreased excursion; minimal rhonchi left upper lung  Heart: S1 S2  Regular rate and rhythm. No rub, murmur or gallop  Abdomen: Soft, non-tender. BS normal. No masses, organomegaly; no rebound or guarding  Extremities: No edema, Peripheral pulses palpable  Musculoskeletal: Muscular strength appears intact. Neuro:  No focal motor defects ; II-XII grossly intact .  GLASER equally    TELEMETRY: REVIEWED--Telemetry: Sinus    ASSESSMENT:   Principal Problem:    Acute and chronic respiratory failure with hypoxia (HCC)  Active Problems:    COPD (chronic obstructive pulmonary disease) (HCC)    Bullous emphysema (HCC)    Pulmonary emphysema with fibrosis of lung ([address]    Thoracic ascending aortic aneurysm (HCC)    Sepsis (HCC)--sepsis ruled out after studies; sepsis was an ED diagnosis not mine    Infection due to parainfluenza virus 3 Hypophosphatemia    Glucose intolerance    Hilar adenopathy  Resolved Problems:    Respiratory failure, acute-on-chronic (HCC)      PLAN:  SEE ORDERS      RE  CHANGES AND FINDINGS   Medications reviewed with patient  GI prophylaxis  DVT prophylaxis  Consultants notes reviewed   Monitor labs  Doxycycline has been started  Sliding scale insulin  Methylprednisolone 80 mg every 8 hours IV  Potassium phosphate 20 mmol IV today  Continue aerosol treatments  Bronchoscopy if okay with others  Monitor labs  Bronchoscopy this afternoon  Sliding scale insulin  Doxycycline 100 mg every 12 hours by mouth  Continue aerosols  Methylprednisolone 80 mg IV every 8 hours  Methylprednisolone 80 mg IV every 8 hours  Continue aerosol treatments  Sliding scale insulin  Doxycycline 100 mg by mouth every 12 hours  Tessalon Perles  Robitussin with codeine for severe cough  Await microbiology results and cytology from bronchoscopy            Discussed with patient and nursing.       Keisha Jaimes DO     1:06 PM     6/13/2019    TIME > 25 MINUTES    >  50 %  OF  TIME  DISCUSSION     Active Hospital Problems    Diagnosis    Infection due to parainfluenza virus 3 [B34.8]    Hypophosphatemia [E83.39]    Glucose intolerance [E74.39]    Hilar adenopathy [R59.0]    Sepsis ([address] [A41.9]    Pulmonary emphysema with fibrosis of lung ([address] [J43.9, J84.10]    Thoracic ascending aortic aneurysm (HCC) [I71.2]    Bullous emphysema ([address] [J43.9]    COPD (chronic obstructive pulmonary disease) (HCC) [J44.9]    Acute and chronic respiratory failure with hypoxia ([facility]) [J96.21]                 ------------  INFORMATION  -----------      DIET:DIET GENERAL;      No Known Allergies      MEDICATION SIDE EFFECTS:none       SCHEDULED MEDS:                                 Current Facility-Administered Medications   Medication Dose Route Frequency Provider Last Rate Last Dose    glucose (GLUTOSE) 40 % oral gel 15 g  15 g Oral PRN Keisha Jaimes DO mL Intravenous BID [place] Kinzao Mihok, DO   10 mL at 19 2032    sodium chloride flush 0.9 % injection 10 mL  10 mL Intravenous PRN [place]bharathi Bachk, DO   10 mL at 19 1210     Scheduled Meds:   insulin lispro  0-6 Units Subcutaneous TID WC    insulin lispro  0-3 Units Subcutaneous Nightly    benzonatate  200 mg Oral TID    doxycycline hyclate  100 mg Oral 2 times per day    ibuprofen  200 mg Oral TID WC    budesonide  250 mcg Nebulization BID    formoterol  20 mcg Nebulization BID    methylPREDNISolone  80 mg Intravenous Q8H    ipratropium-albuterol  1 ampule Inhalation Q4H WA    pantoprazole  40 mg Oral QAM AC    sodium chloride flush  10 mL Intravenous BID       Continuous Infusions:   dextrose      sodium chloride 100 mL/hr at 19 1756         Data:   Temperature:  Current - Temp: 97.9 °F (36.6 °C); Max - Temp  Av.3 °F (36.3 °C)  Min: 96 °F (35.6 °C)  Max: 98.1 °F (36.7 °C)    Respiratory Rate : Resp  Av.6  Min: 18  Max: 20    Pulse Range: Pulse  Av.3  Min: 72  Max: 103    Blood Presuure Range:  Systolic (39MNB), ZEF:895 , Min:146 , MGV:626   ; Diastolic (68OMC), LWZ:96, Min:74, Max:107      Pulse ox Range: SpO2  Av.3 %  Min: 83 %  Max: 99 %    Patient Vitals for the past 8 hrs:   BP Temp Temp src Pulse Resp SpO2   19 0845 (!) 146/88 97.9 °F (36.6 °C) Oral 86 18 91 %         Intake/Output Summary (Last 24 hours) at 2019 1306  Last data filed at 2019 1209  Gross per 24 hour   Intake 3057 ml   Output 700 ml   Net 2357 ml       I/O last 3 completed shifts: In: 2807 [P.O.:420; I.V.:2387]  Out: 700 [Urine:700]    I/O this shift:  In: 250 [P.O.:240;  I.V.:10]  Out: -     Wt Readings from Last 3 Encounters:   19 219 lb 11.2 oz (99.7 kg)   19 213 lb (96.6 kg)   19 205 lb (93 kg)       Labs:   CBC:   Lab Results   Component Value Date    WBC 10.1 2019    RBC 4.21 2019    HGB 12.3 2019    HCT 37.6 2019    MCV 89.3 06/13/2019    MCH 29.2 06/13/2019    MCHC 32.7 06/13/2019    RDW 14.8 06/13/2019     06/13/2019    MPV 8.9 06/13/2019     CBC with Differential:    Lab Results   Component Value Date    WBC 10.1 06/13/2019    RBC 4.21 06/13/2019    HGB 12.3 06/13/2019    HCT 37.6 06/13/2019     06/13/2019    MCV 89.3 06/13/2019    MCH 29.2 06/13/2019    MCHC 32.7 06/13/2019    RDW 14.8 06/13/2019    LYMPHOPCT 4.1 06/13/2019    MONOPCT 1.7 06/13/2019    BASOPCT 0.1 06/13/2019    MONOSABS 0.17 06/13/2019    LYMPHSABS 0.41 06/13/2019    EOSABS 0.00 06/13/2019    BASOSABS 0.01 06/13/2019     Hemoglobin/Hematocrit:    Lab Results   Component Value Date    HGB 12.3 06/13/2019    HCT 37.6 06/13/2019     CMP:    Lab Results   Component Value Date     06/13/2019    K 4.2 06/13/2019    K 4.4 04/22/2019     06/13/2019    CO2 27 06/13/2019    BUN 12 06/13/2019    CREATININE 0.6 06/13/2019    GFRAA >60 06/13/2019    LABGLOM >60 06/13/2019    GLUCOSE 123 06/13/2019    PROT 6.5 05/30/2019    LABALBU 3.6 05/30/2019    CALCIUM 9.2 06/13/2019    BILITOT 0.9 05/30/2019    ALKPHOS 73 05/30/2019    AST 12 05/30/2019    ALT 7 05/30/2019     BMP:    Lab Results   Component Value Date     06/13/2019    K 4.2 06/13/2019    K 4.4 04/22/2019     06/13/2019    CO2 27 06/13/2019    BUN 12 06/13/2019    LABALBU 3.6 05/30/2019    CREATININE 0.6 06/13/2019    CALCIUM 9.2 06/13/2019    GFRAA >60 06/13/2019    LABGLOM >60 06/13/2019    GLUCOSE 123 06/13/2019     Hepatic Function Panel:    Lab Results   Component Value Date    ALKPHOS 73 05/30/2019    ALT 7 05/30/2019    AST 12 05/30/2019    PROT 6.5 05/30/2019    BILITOT 0.9 05/30/2019    LABALBU 3.6 05/30/2019     Magnesium:    Lab Results   Component Value Date    MG 1.9 06/13/2019     Phosphorus:    Lab Results   Component Value Date    PHOS 2.9 06/13/2019     Uric Acid:    Lab Results   Component Value Date    LABURIC 3.5 05/31/2019     PT/INR:    Lab Results   Component Value Date    PROTIME 12.5 05/30/2019    INR 1.1 05/30/2019     Warfarin PT/INR:  No components found for: Viridiana Polanco  PTT:    Lab Results   Component Value Date    APTT 31.4 05/30/2019   [APTT}  Troponin:    Lab Results   Component Value Date    TROPONINI <0.01 06/09/2019     Last 3 Troponin:    Lab Results   Component Value Date    TROPONINI <0.01 06/09/2019    TROPONINI <0.01 05/31/2019    TROPONINI <0.01 05/28/2019     U/A:    Lab Results   Component Value Date    COLORU Yellow 06/09/2019    PROTEINU Negative 06/09/2019    PHUR 6.0 06/09/2019    LABCAST MANY 05/30/2019    WBCUA NONE 06/09/2019    RBCUA NONE 06/09/2019    MUCUS Present 04/22/2019    BACTERIA NONE 06/09/2019    CLARITYU Clear 06/09/2019    SPECGRAV 1.010 06/09/2019    LEUKOCYTESUR Negative 06/09/2019    UROBILINOGEN 0.2 06/09/2019    BILIRUBINUR LARGE 06/09/2019    BLOODU Negative 06/09/2019    GLUCOSEU Negative 06/09/2019     ABG:    Lab Results   Component Value Date    PH 7.440 05/28/2019    PCO2 32.1 05/28/2019    PO2 59.1 05/28/2019    HCO3 21.3 05/28/2019    BE -1.8 05/28/2019    O2SAT 91.6 05/28/2019     HgBA1c:    Lab Results   Component Value Date    LABA1C 5.4 06/09/2019     FLP:    Lab Results   Component Value Date    TRIG 35 06/10/2019    HDL 44 06/10/2019    LDLCALC 60 06/10/2019    LABVLDL 7 06/10/2019     TSH:    Lab Results   Component Value Date    TSH 0.489 06/10/2019     VITAMIN B12: No components found for: B12  FOLATE:    Lab Results   Component Value Date    FOLATE 11.4 06/09/2019        CBC:  Recent Labs     06/11/19  0356 06/12/19  0349 06/13/19  0320   WBC 15.0* 11.1 10.1   RBC 4.15 4.12 4.21   HGB 12.4* 12.2* 12.3*   HCT 37.1 36.8* 37.6    291 300   MCV 89.4 89.3 89.3   MCH 29.9 29.6 29.2   MCHC 33.4 33.2 32.7   RDW 15.0 14.8 14.8   LYMPHOPCT 1.8* 3.1* 4.1*   MONOPCT 2.1 2.1 1.7*   BASOPCT 0.1 0.0 0.1   MONOSABS 0.31 0.23 0.17   LYMPHSABS 0.27* 0.34* 0.41*   EOSABS 0.00* 0.00* 0.00*   BASOSABS 0.02 0.00 0.01          H & H :  Recent Labs     06/11/19  0356 06/12/19  0349 06/13/19  0320   HGB 12.4* 12.2* 12.3*       TSH:  No results for input(s): TSH in the last 72 hours. GLUCOSE:No results for input(s): POCGLU in the last 72 hours. CMP:  Recent Labs     06/11/19  0356 06/12/19  0349 06/13/19  0320    141 142   K 4.2 4.0 4.2    103 102   CO2 23 26 27   BUN 9 9 12   CREATININE 0.5* 0.5* 0.6*   GFRAA >60 >60 >60   LABGLOM >60 >60 >60   GLUCOSE 159* 138* 123*   CALCIUM 9.3 9.3 9.2        BNP:No results found for: BNP    PROTIME/INR:No results for input(s): PROTIME, INR in the last 72 hours. CRP:   No results for input(s): CRP in the last 72 hours. ESR:  No results for input(s): SEDRATE in the last 72 hours. LIPASE , AMYLASE:  Lab Results   Component Value Date    LIPASE 16 05/30/2019      No results found for: AMYLASE    ABGs:   Lab Results   Component Value Date    PH 7.440 05/28/2019    PO2 59.1 05/28/2019    PCO2 32.1 05/28/2019       CARDIAC:   No results for input(s): CKTOTAL, CKMB, CKMBINDEX, TROPONINI in the last 72 hours. Lipid Profile:   Lab Results   Component Value Date    TRIG 35 06/10/2019    HDL 44 06/10/2019    LDLCALC 60 06/10/2019    CHOL 111 06/10/2019        Lab Results   Component Value Date    LABA1C 5.4 06/09/2019            RADIOLOGY: REVIEWED AVAILABLE REPORT  CTA CHEST W CONTRAST   Final Result   No central pulmonary embolism or aortic dissection. Extensive bullous emphysema with a progressive patchy masslike   infiltrate in the left upper lobe with extensive lymphadenopathy in   the mediastinum and hilum. Infectious inflammatory process like   pneumonia is a consideration. Underlying malignancy is not excluded. Short interval follow-up examination with repeat CT scan in 6-8 weeks   is recommended to see resolution. It persists, further assessment by   biopsy is recommended.       ALERT:  THIS IS AN ABNORMAL REPORT            XR CHEST STANDARD (2 VW)   Final Result   Left upper lobe pneumonia appears resolved.                             [address] [place] [facility]   1:06 PM     6/13/2019      Voice recognition software used for dictation

## 2019-06-13 NOTE — PROGRESS NOTES
Pulmonary Progress Note    Admit Date: 2019  Hospital day                               PCP: Vy Cochran MD    Chief Complaint (s):  Patient Active Problem List   Diagnosis    Left upper lobe pneumonia ([address]    COPD (chronic obstructive pulmonary disease) ([address]    Acute and chronic respiratory failure with hypoxia ([address]    Closed displaced fracture of second metatarsal bone of right foot with routine healing    COPD exacerbation ([address]    Bullous emphysema ([address]    Right lower lobe pneumonia ([address]    Pulmonary emphysema with fibrosis of lung ([address]    Thoracic ascending aortic aneurysm ([address]    Pneumonia    Cough with hemoptysis    Pneumonia, bacterial    S/P lumbar fusion    Sepsis ([address]    Infection due to parainfluenza virus 3    Hypophosphatemia    Glucose intolerance    Hilar adenopathy       Subjective:  · Complications from bronchoscopy. Complains of a dry mouth and lips.       Vitals:  VITALS:  BP (!) 146/88   Pulse 86   Temp 97.9 °F (36.6 °C) (Oral)   Resp 18   Ht 6' 1\" (1.854 m)   Wt 219 lb 11.2 oz (99.7 kg)   SpO2 91%   BMI 28.99 kg/m²     24HR INTAKE/OUTPUT:      Intake/Output Summary (Last 24 hours) at 2019 1521  Last data filed at 2019 1209  Gross per 24 hour   Intake 2857 ml   Output 700 ml   Net 2157 ml       24HR PULSE OXIMETRY RANGE:    SpO2  Av.5 %  Min: 91 %  Max: 97 %    Medications:  IV:   dextrose      sodium chloride 100 mL/hr at 19 1439       Scheduled Meds:   insulin lispro  0-6 Units Subcutaneous TID     insulin lispro  0-3 Units Subcutaneous Nightly    benzonatate  200 mg Oral TID    doxycycline hyclate  100 mg Oral 2 times per day    ibuprofen  200 mg Oral TID WC    budesonide  250 mcg Nebulization BID    formoterol  20 mcg Nebulization BID    methylPREDNISolone  80 mg Intravenous Q8H    ipratropium-albuterol  1 ampule Inhalation Q4H WA    pantoprazole  40 mg Oral QAM AC    sodium chloride flush  10 mL Intravenous BID       Diet:   DIET GENERAL;     EXAM:  General: No distress. Alert. Eyes: PERRL. No sclera icterus. No conjunctival injection. ENT: No discharge. Pharynx clear. Neck: Trachea midline. Normal thyroid. Resp: No accessory muscle use. No rales. No wheezing. No rhonchi. CV: Regular rate. Regular rhythm. No murmur or rub. Abd: Non-tender. Non-distended. No masses. No organomegaly. Normal bowel sounds. Skin: Warm and dry. No nodule on exposed extremities. No rash on exposed extremities. Ext: No cyanosis, clubbing, edema  Lymph: No cervical LAD. No supraclavicular LAD. M/S: No cyanosis. No joint deformity. No clubbing. Neuro: Awake. Follows commands. Positive pupils/gag/corneals. Normal pain response. Results:  CBC:   Recent Labs     06/11/19  0356 06/12/19  0349 06/13/19  0320   WBC 15.0* 11.1 10.1   HGB 12.4* 12.2* 12.3*   HCT 37.1 36.8* 37.6   MCV 89.4 89.3 89.3    291 300     BMP:   Recent Labs     06/11/19  0356 06/12/19  0349 06/13/19  0320    141 142   K 4.2 4.0 4.2    103 102   CO2 23 26 27   PHOS 2.1* 2.5 2.9   BUN 9 9 12   CREATININE 0.5* 0.5* 0.6*     LIVER PROFILE: No results for input(s): AST, ALT, LIPASE, BILIDIR, BILITOT, ALKPHOS in the last 72 hours. Invalid input(s): AMYLASE,  ALB  PT/INR: No results for input(s): PROTIME, INR in the last 72 hours. APTT: No results for input(s): APTT in the last 72 hours. Pathology:  1. No evidence of malignancy      Microbiology:  1. Culturing oral pharyngeal tonja. Microbrush is pending    Recent ABG:   No results for input(s): PH, PO2, PCO2, HCO3, BE, O2SAT, METHB, O2HB, COHB, O2CON, HHB, THB in the last 72 hours. Recent Films:  CTA CHEST W CONTRAST   Final Result   No central pulmonary embolism or aortic dissection. Extensive bullous emphysema with a progressive patchy masslike   infiltrate in the left upper lobe with extensive lymphadenopathy in   the mediastinum and hilum.  Infectious inflammatory process like   pneumonia is a consideration. Underlying malignancy is not excluded. Short interval follow-up examination with repeat CT scan in 6-8 weeks   is recommended to see resolution. It persists, further assessment by   biopsy is recommended. ALERT:  THIS IS AN ABNORMAL REPORT            XR CHEST STANDARD (2 VW)   Final Result   Left upper lobe pneumonia appears resolved. Assessment:  1. Pneumonia, nosocomial as seen above. Malignancy is thought to be far less likely given the time intervals. Going from left to right June, May, and April 2019 at the dates of the scans  2. Parainfluenza 3 pneumonia  3. GOLD class 4 COPD        Plan:  1. Supportive care  2. Antibiotics per infectious disease         Care reviewed with the staff and the patient's family as available. Please note that voice recognition technology was used in the preparation of this note and make therefore it may contain inadvertent transcription errors. Guru Griffin M.D., F.C.C.P.     [facility], [address] [facility], [address] [place] [address]

## 2019-06-14 VITALS
SYSTOLIC BLOOD PRESSURE: 132 MMHG | TEMPERATURE: 97.1 F | OXYGEN SATURATION: 92 % | BODY MASS INDEX: 28.2 KG/M2 | HEIGHT: 73 IN | RESPIRATION RATE: 16 BRPM | HEART RATE: 96 BPM | WEIGHT: 212.8 LBS | DIASTOLIC BLOOD PRESSURE: 60 MMHG

## 2019-06-14 LAB
ANION GAP SERPL CALCULATED.3IONS-SCNC: 11 MMOL/L (ref 7–16)
BLOOD CULTURE, ROUTINE: NORMAL
BUN BLDV-MCNC: 15 MG/DL (ref 8–23)
CALCIUM SERPL-MCNC: 8.9 MG/DL (ref 8.6–10.2)
CHLORIDE BLD-SCNC: 98 MMOL/L (ref 98–107)
CO2: 26 MMOL/L (ref 22–29)
CREAT SERPL-MCNC: 0.6 MG/DL (ref 0.7–1.2)
CULTURE, BLOOD 2: NORMAL
CULTURE, RESPIRATORY: NORMAL
CULTURE, RESPIRATORY: NORMAL
GFR AFRICAN AMERICAN: >60
GFR NON-AFRICAN AMERICAN: >60 ML/MIN/1.73
GLUCOSE BLD-MCNC: 128 MG/DL (ref 74–99)
MAGNESIUM: 1.8 MG/DL (ref 1.6–2.6)
METER GLUCOSE: 105 MG/DL (ref 74–99)
METER GLUCOSE: 127 MG/DL (ref 74–99)
METER GLUCOSE: 99 MG/DL (ref 74–99)
POTASSIUM SERPL-SCNC: 4.1 MMOL/L (ref 3.5–5)
SMEAR, RESPIRATORY: NORMAL
SMEAR, RESPIRATORY: NORMAL
SODIUM BLD-SCNC: 135 MMOL/L (ref 132–146)

## 2019-06-14 PROCEDURE — 80048 BASIC METABOLIC PNL TOTAL CA: CPT

## 2019-06-14 PROCEDURE — 6370000000 HC RX 637 (ALT 250 FOR IP): Performed by: SPECIALIST

## 2019-06-14 PROCEDURE — 6360000002 HC RX W HCPCS: Performed by: INTERNAL MEDICINE

## 2019-06-14 PROCEDURE — 6370000000 HC RX 637 (ALT 250 FOR IP): Performed by: INTERNAL MEDICINE

## 2019-06-14 PROCEDURE — 94640 AIRWAY INHALATION TREATMENT: CPT

## 2019-06-14 PROCEDURE — 2700000000 HC OXYGEN THERAPY PER DAY

## 2019-06-14 PROCEDURE — 36415 COLL VENOUS BLD VENIPUNCTURE: CPT

## 2019-06-14 PROCEDURE — 2580000003 HC RX 258: Performed by: INTERNAL MEDICINE

## 2019-06-14 PROCEDURE — 82962 GLUCOSE BLOOD TEST: CPT

## 2019-06-14 PROCEDURE — 83735 ASSAY OF MAGNESIUM: CPT

## 2019-06-14 RX ORDER — DIAPER,BRIEF,INFANT-TODD,DISP
EACH MISCELLANEOUS 4 TIMES DAILY
Status: DISCONTINUED | OUTPATIENT
Start: 2019-06-14 | End: 2019-06-14 | Stop reason: HOSPADM

## 2019-06-14 RX ORDER — CODEINE PHOSPHATE AND GUAIFENESIN 10; 100 MG/5ML; MG/5ML
5 SOLUTION ORAL EVERY 4 HOURS PRN
Qty: 420 ML | Refills: 0 | Status: SHIPPED | OUTPATIENT
Start: 2019-06-14 | End: 2019-06-17

## 2019-06-14 RX ORDER — ACYCLOVIR 200 MG/1
400 CAPSULE ORAL 3 TIMES DAILY
Status: DISCONTINUED | OUTPATIENT
Start: 2019-06-14 | End: 2019-06-14 | Stop reason: HOSPADM

## 2019-06-14 RX ORDER — BENZONATATE 200 MG/1
200 CAPSULE ORAL 3 TIMES DAILY
Qty: 21 CAPSULE | Refills: 0 | Status: SHIPPED | OUTPATIENT
Start: 2019-06-14 | End: 2019-06-21

## 2019-06-14 RX ORDER — NYSTATIN AND TRIAMCINOLONE ACETONIDE 100000; 1 [USP'U]/G; MG/G
OINTMENT TOPICAL
Qty: 30 G | Refills: 1 | Status: SHIPPED | OUTPATIENT
Start: 2019-06-14 | End: 2019-09-03

## 2019-06-14 RX ORDER — DOXYCYCLINE HYCLATE 100 MG/1
100 CAPSULE ORAL EVERY 12 HOURS SCHEDULED
Qty: 20 CAPSULE | Refills: 0 | Status: SHIPPED | OUTPATIENT
Start: 2019-06-14 | End: 2019-06-24

## 2019-06-14 RX ORDER — ACYCLOVIR 400 MG/1
400 TABLET ORAL 3 TIMES DAILY
Qty: 15 TABLET | Refills: 0 | Status: SHIPPED | OUTPATIENT
Start: 2019-06-14 | End: 2019-06-19

## 2019-06-14 RX ORDER — NYSTATIN AND TRIAMCINOLONE ACETONIDE 100000; 1 [USP'U]/G; MG/G
OINTMENT TOPICAL 2 TIMES DAILY
Status: DISCONTINUED | OUTPATIENT
Start: 2019-06-14 | End: 2019-06-14 | Stop reason: HOSPADM

## 2019-06-14 RX ADMIN — IBUPROFEN 200 MG: 200 TABLET, FILM COATED ORAL at 16:06

## 2019-06-14 RX ADMIN — METHYLPREDNISOLONE SODIUM SUCCINATE 40 MG: 40 INJECTION, POWDER, FOR SOLUTION INTRAMUSCULAR; INTRAVENOUS at 04:07

## 2019-06-14 RX ADMIN — ACYCLOVIR 400 MG: 200 CAPSULE ORAL at 16:06

## 2019-06-14 RX ADMIN — BENZONATATE 200 MG: 100 CAPSULE ORAL at 09:37

## 2019-06-14 RX ADMIN — GUAIFENESIN AND CODEINE PHOSPHATE 5 ML: 10; 100 LIQUID ORAL at 04:07

## 2019-06-14 RX ADMIN — METHYLPREDNISOLONE SODIUM SUCCINATE 40 MG: 40 INJECTION, POWDER, FOR SOLUTION INTRAMUSCULAR; INTRAVENOUS at 12:01

## 2019-06-14 RX ADMIN — BUDESONIDE 250 MCG: 0.25 SUSPENSION RESPIRATORY (INHALATION) at 08:41

## 2019-06-14 RX ADMIN — FORMOTEROL FUMARATE DIHYDRATE 20 MCG: 20 SOLUTION RESPIRATORY (INHALATION) at 08:41

## 2019-06-14 RX ADMIN — IPRATROPIUM BROMIDE AND ALBUTEROL SULFATE 1 AMPULE: .5; 3 SOLUTION RESPIRATORY (INHALATION) at 08:41

## 2019-06-14 RX ADMIN — Medication 10 ML: at 12:01

## 2019-06-14 RX ADMIN — IBUPROFEN 200 MG: 200 TABLET, FILM COATED ORAL at 12:00

## 2019-06-14 RX ADMIN — DOXYCYCLINE HYCLATE 100 MG: 100 CAPSULE ORAL at 09:37

## 2019-06-14 RX ADMIN — BENZONATATE 200 MG: 100 CAPSULE ORAL at 13:42

## 2019-06-14 RX ADMIN — HYDROCORTISONE: 1 CREAM TOPICAL at 16:41

## 2019-06-14 RX ADMIN — IPRATROPIUM BROMIDE AND ALBUTEROL SULFATE 1 AMPULE: .5; 3 SOLUTION RESPIRATORY (INHALATION) at 12:08

## 2019-06-14 RX ADMIN — PANTOPRAZOLE SODIUM 40 MG: 40 TABLET, DELAYED RELEASE ORAL at 06:14

## 2019-06-14 RX ADMIN — IBUPROFEN 200 MG: 200 TABLET, FILM COATED ORAL at 09:38

## 2019-06-14 ASSESSMENT — PAIN DESCRIPTION - FREQUENCY
FREQUENCY: INTERMITTENT
FREQUENCY: CONTINUOUS

## 2019-06-14 ASSESSMENT — PAIN DESCRIPTION - DESCRIPTORS
DESCRIPTORS: ACHING;DISCOMFORT;DULL
DESCRIPTORS: ACHING;DISCOMFORT;SORE

## 2019-06-14 ASSESSMENT — PAIN DESCRIPTION - LOCATION
LOCATION: GENERALIZED
LOCATION: GENERALIZED

## 2019-06-14 ASSESSMENT — PAIN DESCRIPTION - PROGRESSION
CLINICAL_PROGRESSION: NOT CHANGED
CLINICAL_PROGRESSION: NOT CHANGED

## 2019-06-14 ASSESSMENT — PAIN DESCRIPTION - PAIN TYPE
TYPE: ACUTE PAIN
TYPE: ACUTE PAIN

## 2019-06-14 ASSESSMENT — PAIN SCALES - GENERAL
PAINLEVEL_OUTOF10: 3
PAINLEVEL_OUTOF10: 3
PAINLEVEL_OUTOF10: 1
PAINLEVEL_OUTOF10: 0

## 2019-06-14 ASSESSMENT — PAIN - FUNCTIONAL ASSESSMENT
PAIN_FUNCTIONAL_ASSESSMENT: PREVENTS OR INTERFERES SOME ACTIVE ACTIVITIES AND ADLS
PAIN_FUNCTIONAL_ASSESSMENT: PREVENTS OR INTERFERES SOME ACTIVE ACTIVITIES AND ADLS

## 2019-06-14 ASSESSMENT — PAIN DESCRIPTION - ORIENTATION
ORIENTATION: LEFT;RIGHT
ORIENTATION: RIGHT;LEFT

## 2019-06-14 ASSESSMENT — PAIN DESCRIPTION - ONSET
ONSET: ON-GOING
ONSET: ON-GOING

## 2019-06-14 NOTE — PROGRESS NOTES
[address] [place] [address] [facility]  DAT  Progress Note    SUBJECTIVE:  No chief complaint on file. Patient had dropped from my census, therefore I had not seen him in several days. I was reminded by pulmonary that he was still here. He has had a bronchoscopy. He is feeling better. He is complaining of some sores in his nares and left lower lip. Breathing is good. No nausea, vomiting or diarrhea. Nonproductive cough. Review of systems:  As stated above in the chief complaint, otherwise negative. Medications:  Scheduled Meds:   hydrocortisone   Topical 4x Daily    methylPREDNISolone  40 mg Intravenous Q8H    insulin lispro  0-6 Units Subcutaneous TID WC    insulin lispro  0-3 Units Subcutaneous Nightly    benzonatate  200 mg Oral TID    doxycycline hyclate  100 mg Oral 2 times per day    ibuprofen  200 mg Oral TID WC    budesonide  250 mcg Nebulization BID    formoterol  20 mcg Nebulization BID    ipratropium-albuterol  1 ampule Inhalation Q4H WA    pantoprazole  40 mg Oral QAM AC    sodium chloride flush  10 mL Intravenous BID     Continuous Infusions:   dextrose      sodium chloride 100 mL/hr at 19 2353     PRN Meds:glucose, dextrose, glucagon (rDNA), dextrose, guaiFENesin-codeine, ondansetron, acetaminophen, sodium chloride flush    OBJECTIVE:  /60   Pulse 96   Temp 97.1 °F (36.2 °C) (Oral)   Resp 16   Ht 6' 1\" (1.854 m)   Wt 212 lb 12.8 oz (96.5 kg)   SpO2 92%   BMI 28.08 kg/m²   Temp  Av.7 °F (36.5 °C)  Min: 97.1 °F (36.2 °C)  Max: 98.4 °F (36.9 °C)  Constitutional: The patient is awake, alert, and oriented. No distress. Skin: Warm and dry. No rashes were noted. HEENT: Eyes show round, and reactive pupils. No jaundice. Ulcerations noted over the nares and left lower lip. Neck: Supple to movements. No lymphadenopathy. Chest: No use of accessory muscles to breathe. Symmetrical expansion.  Auscultation reveals no wheezing, crackles, or rhonchi. Cardiovascular: S1 and S2 are rhythmic and regular. No murmurs appreciated. Abdomen: Positive bowel sounds to auscultation. Benign to palpation. No masses felt. No hepatosplenomegaly. Extremities: No clubbing, no cyanosis, no edema. Lines: peripheral    Laboratory and Tests Review:  Lab Results   Component Value Date    WBC 10.1 06/13/2019    WBC 11.1 06/12/2019    WBC 15.0 (H) 06/11/2019    HGB 12.3 (L) 06/13/2019    HCT 37.6 06/13/2019    MCV 89.3 06/13/2019     06/13/2019     Lab Results   Component Value Date    NEUTROABS 9.36 (H) 06/13/2019    NEUTROABS 10.36 (H) 06/12/2019    NEUTROABS 14.20 (H) 06/11/2019     Lab Results   Component Value Date    CRP 5.7 (H) 06/09/2019    CRP 4.6 (H) 06/01/2019    CRP 12.2 (H) 05/30/2019     No results found for: CRPHS  Lab Results   Component Value Date    SEDRATE 32 (H) 06/09/2019    SEDRATE 29 (H) 06/01/2019    SEDRATE 42 (H) 05/30/2019     Lab Results   Component Value Date    ALT 7 05/30/2019    AST 12 05/30/2019    ALKPHOS 73 05/30/2019    BILITOT 0.9 05/30/2019     Lab Results   Component Value Date     06/14/2019    K 4.1 06/14/2019    K 4.4 04/22/2019    CL 98 06/14/2019    CO2 26 06/14/2019    BUN 15 06/14/2019    CREATININE 0.6 06/14/2019    CREATININE 0.6 06/13/2019    CREATININE 0.5 06/12/2019    GFRAA >60 06/14/2019    LABGLOM >60 06/14/2019    GLUCOSE 128 06/14/2019    PROT 6.5 05/30/2019    LABALBU 3.6 05/30/2019    CALCIUM 8.9 06/14/2019    BILITOT 0.9 05/30/2019    ALKPHOS 73 05/30/2019    AST 12 05/30/2019    ALT 7 05/30/2019     Radiology:      Microbiology:   Urine culture 6/10/2019: OP tonja present  BAL 6/12/2019: Oropharyngeal tonja present  Wound culture lumbar spine 6/9/2019: Negative  Blood cultures 6/9/2019: Negative  Streptococcus pneumoniae/Legionella urine Ag: negative   Respiratory panel: Parainfluenza 3 virus    ASSESSMENT:  · HCAP. Status post bronchoscopy.   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  · Parainfluenza 3 viral infection  · Leukocytosis secondary to viral infection, resolved  · Lumbar surgical wound, healing. PLAN:  · Continue oral Doxycycline. Reconciled  · Start oral Acyclovir.   Reconciled  · The patient can be discharged from ID standpoint    Case discussed with pulmonary    Sivakumar Kahn  2:16 PM  6/14/2019

## 2019-06-14 NOTE — PROGRESS NOTES
PROGRESS  NOTE --                                                          INTERNAL  MEDICINE                                                                              I  PERSONALLY SAW , EXAMINED, AND CARED [address], 6/14/2019     LABS, XRAY ,CHART, AND MEDICATIONS  REVIEWED BY ME .      PATIENT PROBLEM LIST:  Principal Problem:    Acute and chronic respiratory failure with hypoxia (HCC)  Active Problems:    COPD (chronic obstructive pulmonary disease) ([address]    Bullous emphysema (HCC)    Pulmonary emphysema with fibrosis of lung ([address]    Thoracic ascending aortic aneurysm ([address]    Sepsis ([address]-- sepsis ruled out after studies; sepsis was an ED diagnosis not mine    Infection due to parainfluenza virus 3    Hypophosphatemia    Glucose intolerance    Hilar adenopathy  Resolved Problems:    Respiratory failure, acute-on-chronic ([address]         6/11/2019-SUBJECTIVE: Felicia Menezes is alert awake and cooperative; oriented ×3. Denies any chest pain dyspnea nausea emesis. Tolerating diet. No abdominal pain. He was seen yesterday by pulmonary as well as infectious disease; those notes reviewed by myself and with the patient. I agree, bronchoscopy should be performed. I also agree unlikely malignancy in view of rapid increase in size of the lesion. In addition patient is immunocompromised, may have unusual infection. Temperature 98.3 respirations 18 pulse 92 blood pressure 146/8697% on 6 L nasal cannula. Sodium 138 potassium 4.2 chloride 103 CO2 23 BUN 9 creatinine 0.5 magnesium 1.9 glucose 159 calcium 9.3 phosphorus 2.1 WBC 15.0 hemoglobin 12.4 platelets 083.   Sputum Gram stain as follows--  Gram Stain Orderable 06/10/2019  5:30 AM  - [facility]   Group 6: <25 PMN's/LPF and <25 Epithelial cells/LPF   Rare Polymorphonuclear leukocytes   Few Epithelial cells   Rare gram positive rods Diphtheroid-like   Rare Gram negative rods   Rare Gram positive cocci in pairs     Legionella and strep antigen titers are negative; blood cultures negative to date. 6/12/2019-patient laying quietly in bed; still has significant cough but the cough medication to help somewhat. No chest pain no significant dyspnea; does have mild sore throat. Bowels and urine functioning normally. Patient was followed up by pulmonary yesterday; scheduled for bronchoscopy today. Temperature 97.6 respirations 16 pulse 89 blood pressure 147/84. Nasal cannula 5 L/min 93% saturation. Sodium 141 potassium 4.0 chloride 103 CO2 26 BUN 9 creatinine 0.5 magnesium 1.9 glucose 138 calcium 9.3 phosphorus 2.5 hemoglobin 12.2 WBC 11.1 platelets 977. Culture results Gram stain etc. all unchanged. 6/13/2019-patient sitting quietly in bed no complaints voiced. He did have some hemoptysis following bronchoscopy but it was scant. No chest pain dyspnea unchanged. He has no dyspnea walking in his room. Questions regarding his wife who has similar illness; she was referred to the ED, given Zithromax and slightly better. I advised him she should do much better, she is not immunocompromised. Bronchoscopy was completed yesterday with the following results--             Endobronchial findings:      Once the trachea was intubated the right left lungs were inspected. Scant secretions were noted throughout the right tracheobronchial tree.     No endobronchial lesions were identified in the right lung.     The left lung was then selectively intubated. Scattered purulent secretions were noted up until the left upper lobe where significant purulent secretions were noted draining specifically from the anterior segment of the left upper lobe. This was aggressively washed and suctioned. Return was bloody and purulent.     Labs were sent for culture and sensitivity, fungal studies, AFB and galactomannan's. Cytology is ordered as well.   A protected brush was passed into the left upper lobe as well. Temperature 97.9 respirations 18 pulse 86 blood pressure 146/88. Nasal cannula 4 L with 91% saturation. Sodium 142 potassium 4.2 chloride 102 CO2 27 BUN 12 creatinine 0.6 magnesium 1.9 glucose 123 calcium 9.2 phosphorus 2.9 most recent glucose 186. Hemoglobin 12.3 WBC 10.1 platelets 202,872. Cultures remain unchanged Gram stain unchanged. 6/14/2019-patient sitting quietly in bed; working crosswCarwow puzzles. Denies any chest pain; no dyspnea at rest.  Minimal dyspnea when he walks. 93% saturation 4.5 L nasal cannula. Temperature 97.1 respirations 16 pulse 96 blood pressure 132/60. Sodium 135 potassium 4.1 chloride 98 CO2 26 BUN 15 creatinine 0.6 magnesium 1.8 glucose 128 calcium 8.9. Gram stain from bronchoscopy with following results--        Gram Stain Orderable 06/12/2019  1:53 [facility] [address]   Group 6: <25 PMN's/LPF and <25 Epithelial cells/LPF   Few Polymorphonuclear leukocytes   Rare Epithelial cells   No organisms seen      Fungus culture, galactomannan pending at this time. Bronchial cytology negative for malignancy. ROS:  Negative to a 10 system review except that mentioned in the HPI. Objective:     PHYSICAL EXAM:    VS: /60   Pulse 96   Temp 97.1 °F (36.2 °C) (Oral)   Resp 16   Ht 6' 1\" (1.854 m)   Wt 212 lb 12.8 oz (96.5 kg)   SpO2 93%   BMI 28.08 kg/m²   General appearance: Alert, Awake, Oriented times 3, no distress  Skin: Warm and dry ; no rashes  Head: Normocephalic. No masses, lesions or tenderness noted  Eyes: Conjunctivae pink, sclera white. PERRL,EOM-I  Ears: External ears normal  Nose/Sinuses: Nares normal. Septum midline. Mucosa normal. No drainage  Oropharynx: Oropharynx clear with no exudate seen  Neck: Supple. No jugular venous distension, lymphadenopathy or thyromegaly Trachea midline  Lungs: Decreased excursion; minimal rhonchi left upper lung  Heart: S1 S2  Regular rate and rhythm.  No rub, murmur or gallop  Abdomen: Soft, non-tender. BS normal. No masses, organomegaly; no rebound or guarding  Extremities: No edema, Peripheral pulses palpable  Musculoskeletal: Muscular strength appears intact. Neuro:  No focal motor defects ; II-XII grossly intact . GLASER equally    TELEMETRY: REVIEWED--Telemetry: Sinus    ASSESSMENT:   Principal Problem:    Acute and chronic respiratory failure with hypoxia (HCC)  Active Problems:    COPD (chronic obstructive pulmonary disease) (HCC)    Bullous emphysema (HCC)    Pulmonary emphysema with fibrosis of lung (HCC)    Thoracic ascending aortic aneurysm (HCC)    Sepsis (HCC)--sepsis ruled out after studies; sepsis was an ED diagnosis not mine    Infection due to parainfluenza virus 3    Hypophosphatemia    Glucose intolerance    Hilar adenopathy  Resolved Problems:    Respiratory failure, acute-on-chronic (HCC)      PLAN:  SEE ORDERS      RE  CHANGES AND FINDINGS   Medications reviewed with patient  GI prophylaxis  DVT prophylaxis  Consultants notes reviewed   Monitor labs  Doxycycline has been started  Sliding scale insulin  Methylprednisolone 80 mg every 8 hours IV  Potassium phosphate 20 mmol IV today  Continue aerosol treatments  Bronchoscopy if okay with others  Monitor labs  Bronchoscopy this afternoon  Sliding scale insulin  Doxycycline 100 mg every 12 hours by mouth  Continue aerosols  Methylprednisolone 80 mg IV every 8 hours  Methylprednisolone 80 mg IV every 8 hours  Continue aerosol treatments  Sliding scale insulin  Doxycycline 100 mg by mouth every 12 hours  Tessalon Perles  Robitussin with codeine for severe cough  Await microbiology results and cytology from bronchoscopy  Serum phosphorus in a.m.  CBC in a.m. Hepatic function panel in a.m. Doxycycline 100 mg by mouth every 12 hours  Reduce methylprednisolone 40 mg IV every 8 hours  Continue aerosol treatments  Hopeful discharge soon            Discussed with patient and nursing.       [address]     12:06 PM 6/14/2019    TIME > 25 MINUTES    >  50 %  OF  TIME  DISCUSSION     Active Hospital Problems    Diagnosis    Infection due to parainfluenza virus 3 [B34.8]    Hypophosphatemia [E83.39]    Glucose intolerance [E74.39]    Hilar adenopathy [R59.0]    [facility] [A41.9]    Pulmonary emphysema with fibrosis of lung ([facility] [J43.9, J84.10]    Thoracic ascending aortic aneurysm (HCC) [I71.2]    Bullous emphysema ([facility] [J43.9]    COPD (chronic obstructive pulmonary disease) ([facility] [J44.9]    Acute and chronic respiratory failure with hypoxia (HCC) [J96.21]                 ------------  INFORMATION  -----------      DIET:DIET GENERAL;      No Known Allergies      MEDICATION SIDE EFFECTS:none       SCHEDULED MEDS:                                 Current Facility-Administered Medications   Medication Dose Route Frequency Provider Last Rate Last Dose    methylPREDNISolone sodium (SOLU-MEDROL) injection 40 mg  40 mg Intravenous Q8H Antolin Hutton MD   40 mg at 06/14/19 1201    glucose (GLUTOSE) 40 % oral gel 15 g  15 g Oral PRN Radha Jaimes, DO        dextrose 50 % IV solution  12.5 g Intravenous PRN Radha Jaimes, DO        glucagon (rDNA) injection 1 mg  1 mg Intramuscular PRN Radha Jaimes, DO        dextrose 5 % solution  100 mL/hr Intravenous PRN Jim Jaimes DO        insulin lispro (HUMALOG) injection vial 0-6 Units  0-6 Units Subcutaneous TID  Jim Jaimes DO   Stopped at 06/14/19 0654    insulin lispro (HUMALOG) injection vial 0-3 Units  0-3 Units Subcutaneous Nightly Jim Jaimes DO   Stopped at 06/13/19 2159    benzonatate (TESSALON) capsule 200 mg  200 mg Oral TID Radha Jaimes DO   200 mg at 06/14/19 6947    doxycycline hyclate (VIBRAMYCIN) capsule 100 mg  100 mg Oral 2 times per day Celso Barlow MD   100 mg at 06/14/19 0937    ibuprofen (ADVIL;MOTRIN) tablet 200 mg  200 mg Oral TID  Jim Jaimes DO   200 mg at 06/14/19 1200    0.9 % sodium chloride infusion Intravenous Continuous Zehra Arturo Mihok,  mL/hr at 19 2353      budesonide (PULMICORT) nebulizer suspension 250 mcg  250 mcg Nebulization BID [place] Arturo Mihok, DO   250 mcg at 19 0841    formoterol (PERFOROMIST) nebulizer solution 20 mcg  20 mcg Nebulization BID Zehra Arturo Mihok, DO   20 mcg at 19 0841    guaiFENesin-codeine (GUAIFENESIN AC) 100-10 MG/5ML liquid 5 mL  5 mL Oral Q4H PRN [place] Frees Mihok, DO   5 mL at 19 0407    ipratropium-albuterol (DUONEB) nebulizer solution 1 ampule  1 ampule Inhalation Q4H WA Jim ANG Moraleshok, DO   1 ampule at 19 0841    ondansetron (ZOFRAN) injection 4 mg  4 mg Intravenous Q6H PRN Zehra Frees Mihok, DO        pantoprazole (PROTONIX) tablet 40 mg  40 mg Oral QAM AC Jim A Mihok, DO   40 mg at 19 3249    acetaminophen (TYLENOL) tablet 650 mg  650 mg Oral Q4H PRN Zehra Frees Mihok, DO   650 mg at 19 2353    sodium chloride flush 0.9 % injection 10 mL  10 mL Intravenous BID [place] Frees Mihok, DO   Stopped at 19 2159    sodium chloride flush 0.9 % injection 10 mL  10 mL Intravenous PRN [place] Frees Mihok, DO   10 mL at 19 1201     Scheduled Meds:   methylPREDNISolone  40 mg Intravenous Q8H    insulin lispro  0-6 Units Subcutaneous TID     insulin lispro  0-3 Units Subcutaneous Nightly    benzonatate  200 mg Oral TID    doxycycline hyclate  100 mg Oral 2 times per day    ibuprofen  200 mg Oral TID     budesonide  250 mcg Nebulization BID    formoterol  20 mcg Nebulization BID    ipratropium-albuterol  1 ampule Inhalation Q4H WA    pantoprazole  40 mg Oral QAM AC    sodium chloride flush  10 mL Intravenous BID       Continuous Infusions:   dextrose      sodium chloride 100 mL/hr at 19 2353         Data:   Temperature:  Current - Temp: 97.1 °F (36.2 °C);  Max - Temp  Av.7 °F (36.5 °C)  Min: 97.1 °F (36.2 °C)  Max: 98.4 °F (36.9 °C)    Respiratory Rate : Resp  Av  Min: 16  Max: 18    Pulse Range: Pulse  Avg: 86.7  Min: 81  Max: 96    Blood Presuure Range:  Systolic (94IWU), QPE:855 , Min:132 , SAW:761   ; Diastolic (70QDR), CTS:88, Min:60, Max:84      Pulse ox Range: SpO2  Av %  Min: 91 %  Max: 95 %    Patient Vitals for the past 8 hrs:   BP Temp Temp src Pulse Resp SpO2   19 0930 132/60 97.1 °F (36.2 °C) Oral 96 16 93 %   19 0843 -- -- -- -- 16 95 %         Intake/Output Summary (Last 24 hours) at 2019 1206  Last data filed at 2019 1025  Gross per 24 hour   Intake 3070 ml   Output 1700 ml   Net 1370 ml       I/O last 3 completed shifts: In: 3130 [P.O.:760;  I.V.:2370]  Out: 1700 [Urine:1700]    I/O this shift:  In: 180 [P.O.:180]  Out: -     Wt Readings from Last 3 Encounters:   19 212 lb 12.8 oz (96.5 kg)   19 213 lb (96.6 kg)   19 205 lb (93 kg)       Labs:   CBC:   Lab Results   Component Value Date    WBC 10.1 2019    RBC 4.21 2019    HGB 12.3 2019    HCT 37.6 2019    MCV 89.3 2019    MCH 29.2 2019    MCHC 32.7 2019    RDW 14.8 2019     2019    MPV 8.9 2019     CBC with Differential:    Lab Results   Component Value Date    WBC 10.1 2019    RBC 4.21 2019    HGB 12.3 2019    HCT 37.6 2019     2019    MCV 89.3 2019    MCH 29.2 2019    MCHC 32.7 2019    RDW 14.8 2019    LYMPHOPCT 4.1 2019    MONOPCT 1.7 2019    BASOPCT 0.1 2019    MONOSABS 0.17 2019    LYMPHSABS 0.41 2019    EOSABS 0.00 2019    BASOSABS 0.01 2019     Hemoglobin/Hematocrit:    Lab Results   Component Value Date    HGB 12.3 2019    HCT 37.6 2019     CMP:    Lab Results   Component Value Date     2019    K 4.1 2019    K 4.4 2019    CL 98 2019    CO2 26 2019    BUN 15 2019    CREATININE 0.6 2019    GFRAA >60 2019    LABGLOM >60 2019    GLUCOSE 128 2019    PROT 6.5 32.1 05/28/2019    PO2 59.1 05/28/2019    HCO3 21.3 05/28/2019    BE -1.8 05/28/2019    O2SAT 91.6 05/28/2019     HgBA1c:    Lab Results   Component Value Date    LABA1C 5.4 06/09/2019     FLP:    Lab Results   Component Value Date    TRIG 35 06/10/2019    HDL 44 06/10/2019    LDLCALC 60 06/10/2019    LABVLDL 7 06/10/2019     TSH:    Lab Results   Component Value Date    TSH 0.489 06/10/2019     VITAMIN B12: No components found for: B12  FOLATE:    Lab Results   Component Value Date    FOLATE 11.4 06/09/2019        CBC:  Recent Labs     06/12/19 0349 06/13/19  0320   WBC 11.1 10.1   RBC 4.12 4.21   HGB 12.2* 12.3*   HCT 36.8* 37.6    300   MCV 89.3 89.3   MCH 29.6 29.2   MCHC 33.2 32.7   RDW 14.8 14.8   LYMPHOPCT 3.1* 4.1*   MONOPCT 2.1 1.7*   BASOPCT 0.0 0.1   MONOSABS 0.23 0.17   LYMPHSABS 0.34* 0.41*   EOSABS 0.00* 0.00*   BASOSABS 0.00 0.01          H & H :  Recent Labs     06/12/19 0349 06/13/19 0320   HGB 12.2* 12.3*       TSH:  No results for input(s): TSH in the last 72 hours. GLUCOSE:No results for input(s): POCGLU in the last 72 hours. CMP:  Recent Labs     06/12/19  0349 06/13/19  0320 06/14/19  0455    142 135   K 4.0 4.2 4.1    102 98   CO2 26 27 26   BUN 9 12 15   CREATININE 0.5* 0.6* 0.6*   GFRAA >60 >60 >60   LABGLOM >60 >60 >60   GLUCOSE 138* 123* 128*   CALCIUM 9.3 9.2 8.9        BNP:No results found for: BNP    PROTIME/INR:No results for input(s): PROTIME, INR in the last 72 hours. CRP:   No results for input(s): CRP in the last 72 hours. ESR:  No results for input(s): SEDRATE in the last 72 hours. LIPASE , AMYLASE:  Lab Results   Component Value Date    LIPASE 16 05/30/2019      No results found for: AMYLASE    ABGs:   Lab Results   Component Value Date    PH 7.440 05/28/2019    PO2 59.1 05/28/2019    PCO2 32.1 05/28/2019       CARDIAC:   No results for input(s): CKTOTAL, CKMB, CKMBINDEX, TROPONINI in the last 72 hours.     Lipid Profile:   Lab Results Component Value Date    TRIG 35 06/10/2019    HDL 44 06/10/2019    LDLCALC 60 06/10/2019    CHOL 111 06/10/2019        Lab Results   Component Value Date    LABA1C 5.4 06/09/2019            RADIOLOGY: REVIEWED AVAILABLE REPORT  CTA CHEST W CONTRAST   Final Result   No central pulmonary embolism or aortic dissection. Extensive bullous emphysema with a progressive patchy masslike   infiltrate in the left upper lobe with extensive lymphadenopathy in   the mediastinum and hilum. Infectious inflammatory process like   pneumonia is a consideration. Underlying malignancy is not excluded. Short interval follow-up examination with repeat CT scan in 6-8 weeks   is recommended to see resolution. It persists, further assessment by   biopsy is recommended. ALERT:  THIS IS AN ABNORMAL REPORT            XR CHEST STANDARD (2 VW)   Final Result   Left upper lobe pneumonia appears resolved.                             [address]   12:06 PM     6/14/2019      Voice recognition software used for dictation

## 2019-06-14 NOTE — PROGRESS NOTES
Pulmonary Progress Note    Admit Date: 2019  Hospital day                               PCP: Isac Whiteside MD    Chief Complaint (s):  Patient Active Problem List   Diagnosis    Left upper lobe pneumonia ([address]    COPD (chronic obstructive pulmonary disease) ([address]    Acute and chronic respiratory failure with hypoxia ([address]    Closed displaced fracture of second metatarsal bone of right foot with routine healing    COPD exacerbation ([address]    Bullous emphysema ([address]    Right lower lobe pneumonia ([address]    Pulmonary emphysema with fibrosis of lung ([address]    Thoracic ascending aortic aneurysm ([address]    Pneumonia    Cough with hemoptysis    Pneumonia, bacterial    S/P lumbar fusion    Sepsis ([address]    Infection due to parainfluenza virus 3    Hypophosphatemia    Glucose intolerance    Hilar adenopathy       Subjective:  · Doing well, sore nose.       Vitals:  VITALS:  /60   Pulse 96   Temp 97.1 °F (36.2 °C) (Oral)   Resp 16   Ht 6' 1\" (1.854 m)   Wt 212 lb 12.8 oz (96.5 kg)   SpO2 92%   BMI 28.08 kg/m²     24HR INTAKE/OUTPUT:      Intake/Output Summary (Last 24 hours) at 2019 1410  Last data filed at 2019 1025  Gross per 24 hour   Intake 3060 ml   Output 1700 ml   Net 1360 ml       24HR PULSE OXIMETRY RANGE:    SpO2  Av.8 %  Min: 91 %  Max: 95 %    Medications:  IV:   dextrose      sodium chloride 100 mL/hr at 19 2353       Scheduled Meds:   methylPREDNISolone  40 mg Intravenous Q8H    insulin lispro  0-6 Units Subcutaneous TID     insulin lispro  0-3 Units Subcutaneous Nightly    benzonatate  200 mg Oral TID    doxycycline hyclate  100 mg Oral 2 times per day    ibuprofen  200 mg Oral TID WC    budesonide  250 mcg Nebulization BID    formoterol  20 mcg Nebulization BID    ipratropium-albuterol  1 ampule Inhalation Q4H WA    pantoprazole  40 mg Oral QAM AC    sodium chloride flush  10 mL Intravenous BID       Diet:   DIET GENERAL; EXAM:  General: No distress. Alert. Eyes: PERRL. No sclera icterus. No conjunctival injection. ENT: No discharge. Pharynx clear. Neck: Trachea midline. Normal thyroid. Resp: No accessory muscle use. No rales. No wheezing. No rhonchi. CV: Regular rate. Regular rhythm. No murmur or rub. Abd: Non-tender. Non-distended. No masses. No organomegaly. Normal bowel sounds. Skin: Warm and dry. No nodule on exposed extremities. No rash on exposed extremities. Ext: No cyanosis, clubbing, edema  Lymph: No cervical LAD. No supraclavicular LAD. M/S: No cyanosis. No joint deformity. No clubbing. Neuro: Awake. Follows commands. Positive pupils/gag/corneals. Normal pain response. Results:  CBC:   Recent Labs     06/12/19 0349 06/13/19  0320   WBC 11.1 10.1   HGB 12.2* 12.3*   HCT 36.8* 37.6   MCV 89.3 89.3    300     BMP:   Recent Labs     06/12/19  0349 06/13/19  0320 06/14/19  0455    142 135   K 4.0 4.2 4.1    102 98   CO2 26 27 26   PHOS 2.5 2.9  --    BUN 9 12 15   CREATININE 0.5* 0.6* 0.6*     LIVER PROFILE: No results for input(s): AST, ALT, LIPASE, BILIDIR, BILITOT, ALKPHOS in the last 72 hours. Invalid input(s): AMYLASE,  ALB  PT/INR: No results for input(s): PROTIME, INR in the last 72 hours. APTT: No results for input(s): APTT in the last 72 hours. Pathology:  1. No evidence of malignancy      Microbiology:  1. Culturing oral pharyngeal tonja. Microbrush is pending    Recent ABG:   No results for input(s): PH, PO2, PCO2, HCO3, BE, O2SAT, METHB, O2HB, COHB, O2CON, HHB, THB in the last 72 hours. Recent Films:  CTA CHEST W CONTRAST   Final Result   No central pulmonary embolism or aortic dissection. Extensive bullous emphysema with a progressive patchy masslike   infiltrate in the left upper lobe with extensive lymphadenopathy in   the mediastinum and hilum. Infectious inflammatory process like   pneumonia is a consideration.  Underlying malignancy is not

## 2019-06-14 NOTE — CARE COORDINATION
Social work / Discharge Planning:       Initial social work assessment completed on 6/10/19. He uses 3.5 liters of 02 at home. Patient has been up ambulating independently. No discharge needs noted at this time.   Electronically signed by MALVIN Rojas on 6/14/2019 at 10:25 AM

## 2019-06-14 NOTE — PLAN OF CARE
Problem: Pain:  Goal: Pain level will decrease  Description  Pain level will decrease  Outcome: Met This Shift  Goal: Control of acute pain  Description  Control of acute pain  Outcome: Met This Shift  Goal: Control of chronic pain  Description  Control of chronic pain  Outcome: Met This Shift     Problem: Breathing Pattern - Ineffective:  Goal: Ability to achieve and maintain a regular respiratory rate will improve  Description  Ability to achieve and maintain a regular respiratory rate will improve  Outcome: Met This Shift     Problem: Airway Clearance - Ineffective:  Goal: Ability to maintain a clear airway will improve  Description  Ability to maintain a clear airway will improve  Outcome: Met This Shift     Problem: Discharge Planning:  Goal: Participates in care planning  Description  Participates in care planning  Outcome: Met This Shift     Problem: Fluid Volume - Deficit:  Goal: Achieves intake and output within specified parameters  Description  Achieves intake and output within specified parameters  Outcome: Met This Shift     Problem: Gas Exchange - Impaired:  Goal: Levels of oxygenation will improve  Description  Levels of oxygenation will improve  Outcome: Met This Shift     Problem: Respiratory  Goal: No pulmonary complications  Outcome: Met This Shift

## 2019-06-14 NOTE — PROGRESS NOTES
[facility] Quality Flow/Interdisciplinary Rounds Progress Note        Quality Flow Rounds held on June 14, 2019    Disciplines Attending:  Bedside Nurse, ,  and Nursing Unit Leadership    Shelli Lira was admitted on 6/9/2019 11:17 AM    Anticipated Discharge Date:  Expected Discharge Date: 06/28/19    Disposition:    Sandro Score:  Sandro Scale Score: 22    Readmission Risk              Risk of Unplanned Readmission:        15           Discussed patient goal for the day, patient clinical progression, and barriers to discharge.   The following Goal(s) of the Day/Commitment(s) have been identified:  Wean steroids, check downgrade to general floor       Brenda Shah  June 14, 2019

## 2019-06-14 NOTE — PLAN OF CARE
Problem: Pain:  Goal: Pain level will decrease  Description  Pain level will decrease  6/14/2019 1350 by Ender Snyder RN  Outcome: Met This Shift     Problem: Pain:  Goal: Control of acute pain  Description  Control of acute pain  6/14/2019 1350 by Ender Snyder RN  Outcome: Met This Shift     Problem: Breathing Pattern - Ineffective:  Goal: Ability to achieve and maintain a regular respiratory rate will improve  Description  Ability to achieve and maintain a regular respiratory rate will improve  6/14/2019 1350 by Ender Snyder RN  Outcome: Met This Shift     Problem: Airway Clearance - Ineffective:  Goal: Ability to maintain a clear airway will improve  Description  Ability to maintain a clear airway will improve  6/14/2019 1350 by Ender Snyder RN  Outcome: Met This Shift     Problem: Hyperthermia:  Goal: Ability to maintain a body temperature in the normal range will improve  Description  Ability to maintain a body temperature in the normal range will improve  Outcome: Met This Shift

## 2019-06-14 NOTE — PROGRESS NOTES
Patient hr monitor and iv d/c per policy and procedure. Patient tolerated well. Patient stated he is current with the flu and pneumonia vaccine.

## 2019-06-15 ENCOUNTER — CARE COORDINATION (OUTPATIENT)
Dept: CASE MANAGEMENT | Age: 69
End: 2019-06-15

## 2019-06-15 DIAGNOSIS — J96.21 ACUTE AND CHRONIC RESPIRATORY FAILURE WITH HYPOXIA (HCC): Primary | ICD-10-CM

## 2019-06-15 LAB
Lab: NORMAL
REPORT: NORMAL
THIS TEST SENT TO: NORMAL

## 2019-06-15 NOTE — CARE COORDINATION
[facility] Transitions Initial Follow Up Call    Call within 2 business days of discharge: Yes    Patient: Ana Nelson Patient : 1950   MRN: <[id number]>  Reason for Admission: pneumonia  Discharge Date: 19 RARS: Readmission Risk Score: 16      Last Discharge [address] [address]       Complaint Diagnosis Description Type Department Provider    19 shortness of breath, cough, recent pneumonia HCAP (healthcare-associated pneumonia) . .. ED to Hosp-Admission (Discharged) (ADMITTED) Moi Abebe DO; Ethan Almodovar DO           Spoke with: Shubham Mancia 6: SEB    Non-face-to-face services provided:  Obtained and reviewed discharge summary and/or continuity of care documents  Assessment and support for treatment adherence and medication management-completed 1111f    Care Transitions 24 Hour Call    Do you have any ongoing symptoms?:  Yes  Patient-reported symptoms:  Cough  Interventions for patient-reported symptoms:  Other  Do you have a copy of your discharge instructions?:  Yes  Do you have all of your prescriptions and are they filled?:  Yes  Have you been contacted by a [address]?:  No  Have you scheduled your follow up appointment?:  Yes  How are you going to get to your appointment?:  Car - drive self  Were you discharged with any Home Care or Post Acute Services:  No  Do you feel like you have everything you need to keep you well at home?:  Yes  Care Transitions Interventions       CTC spoke to Syl Butler for initial [facility] call. Pt stated he is feeling better since discharge, is starting to get up and move around the house more. Stated he continues to have productive cough and some SOB. Stated he has his supplemental 02 on 4.5 LPM continuously. Denies CP/pressure, palpitations, dizziness, nausea. Pt has pulse oximeter at home but has not been checking his 02 saturation level. Advised him to check and increase 02 if sats below 90% and seek medical attention for worsening symptoms. Reviewed medications 1111f. Pt's wife is picking up his cough medicine today. Pt has all other medications and taking as directed. Pt is applying Mycolog to nares. Stated he still has sores in nares and on left lower lip. Pt has pulmonary Dr appt and will call PCP on 6/17 to schedule f/u appt. Agreed to f/u [facility] calls.     Roopa Stout RN BSN   Care Transitions Coordinator  [phone]       Follow Up  Future Appointments   Date Time Provider Tan Miranda   6/20/2019  2:30 PM MIGUE Lopez - CNP [facility]   9/17/2019  3:00 PM MIGUE Lopez - CNP [facility]       Roopa Stout RN

## 2019-06-16 LAB
Lab: NORMAL
REPORT: NORMAL
THIS TEST SENT TO: NORMAL

## 2019-06-22 NOTE — DISCHARGE SUMMARY
DISCHARGE SUMMARY  Patient ID:  Leonie Mcdonald  [id number]  79 y.o.  1950    Admit date: 2019      Discharge date : 2019      Admission Diagnoses: Sepsis ([facility] [address] [A41.9]  Sepsis ([address] [A41.9]  Respiratory failure, acute-on-chronic ([facility] [address] [J96.20]    Discharge Diagnosis  Principal Problem:    Acute and chronic respiratory failure with hypoxia ([address]  Active Problems:    COPD (chronic obstructive pulmonary disease) (HCC)    Bullous emphysema (HCC)    Pulmonary emphysema with fibrosis of lung ([facility] [address]    Thoracic ascending aortic aneurysm (HCC)    Sepsis ([facility] [address]    Infection due to parainfluenza virus 3    Hypophosphatemia    Glucose intolerance    Hilar adenopathy  Resolved Problems:    Respiratory failure, acute-on-chronic [facility])      Hospital Course:      CHIEF COMPLAINT: Shortness of breath, fevers and chills     History of Present Illness: 72-year-old male patient of Dr. Marito Palomo I am asked to admit and follow. Patient was discharged from his hospital 6/3/2019 after treatment for healthcare acquired pneumonia, left upper lobe. He was discharged on oral Levaquin. He states he did not feel very good from the time of discharge until yesterday. In the last 36 hours he began having fevers chills increasing cough myalgias. He presented to the ED. In the ED admitting temperature was 101.1 heart rate of 135 , respirations of 20 blood pressure 123/74. Patient normally uses oxygen only at night; he was requiring daytime oxygen just prior to admission. SPO2 was 92% on 6 L nasal cannula. Chest x-ray in the ED revealed resolution of the left upper lobe pneumonia. However he underwent CTA of the chest with the following results--    Patient MRN:  [id number]  : 1950  Age: 79 years  Gender: Male    Order Date:  2019 3:00 PM    EXAM: CTA CHEST W CONTRAST number of images 467 including MIP coronal  and sagittal reconstruction images. Contrast. Isovue-370, 90 mL intravenously.     Technique: Low-dose CT platelets 330. Culture results Gram stain etc. all unchanged.      6/13/2019-patient sitting quietly in bed no complaints voiced. He did have some hemoptysis following bronchoscopy but it was scant. No chest pain dyspnea unchanged. He has no dyspnea walking in his room. Questions regarding his wife who has similar illness; she was referred to the ED, given Zithromax and slightly better. I advised him she should do much better, she is not immunocompromised. Bronchoscopy was completed yesterday with the following results--             Endobronchial findings:      Once the trachea was intubated the right left lungs were inspected.  Scant secretions were noted throughout the right tracheobronchial tree.     No endobronchial lesions were identified in the right lung.     The left lung was then selectively intubated.  Scattered purulent secretions were noted up until the left upper lobe where significant purulent secretions were noted draining specifically from the anterior segment of the left upper lobe.  This was aggressively washed and suctioned.  Return was bloody and purulent.     Labs were sent for culture and sensitivity, fungal studies, AFB and galactomannan's.  Cytology is ordered as well.  A protected brush was passed into the left upper lobe as well.     Temperature 97.9 respirations 18 pulse 86 blood pressure 146/88. Nasal cannula 4 L with 91% saturation. Sodium 142 potassium 4.2 chloride 102 CO2 27 BUN 12 creatinine 0.6 magnesium 1.9 glucose 123 calcium 9.2 phosphorus 2.9 most recent glucose 186. Hemoglobin 12.3 WBC 10.1 platelets 636,627. Cultures remain unchanged Gram stain unchanged.     6/14/2019-patient sitting quietly in bed; working crossword puzzles. Denies any chest pain; no dyspnea at rest.  Minimal dyspnea when he walks. 93% saturation 4.5 L nasal cannula. Temperature 97.1 respirations 16 pulse 96 blood pressure 132/60.   Sodium 135 potassium 4.1 chloride 98 CO2 26 BUN 15 creatinine 0.6 magnesium 1.8 glucose 128 calcium 8.9. Gram stain from bronchoscopy with following results--        Gram Stain Orderable 06/12/2019  1:[facility]   Group 6: <25 PMN's/LPF and <25 Epithelial cells/LPF   Few Polymorphonuclear leukocytes   Rare Epithelial cells   No organisms seen       Fungus culture, galactomannan pending at this time. Bronchial cytology negative for malignancy. Instructions at discharge:  Serum phosphorus in a.m.  CBC in a.m. Hepatic function panel in a.m.   Doxycycline 100 mg by mouth every 12 hours  Reduce methylprednisolone 40 mg IV every 8 hours  Continue aerosol treatments  Hopeful discharge soon          Condition at DISCHARGE : Jeff 1878     Discharged to: Home    Discharge Instructions: Medications reviewed with patient    Consults:cardiology, pulmonary/intensive care and ID     Past Medical Hx :   Past Medical History:   Diagnosis Date    Acute and chronic respiratory failure with hypoxia ([address] 10/12/2017    Acute respiratory failure with hypoxia ([address] 11/12/2018    Bullous emphysema ([address] 11/12/2018    Colon cancer screening     COPD (chronic obstructive pulmonary disease) (HCC)     Cough with hemoptysis 4/23/2019    Glucose intolerance 6/10/2019    Hilar adenopathy 6/10/2019    Hypophosphatemia 6/10/2019    Infection due to parainfluenza virus 3 6/10/2019    Left upper lobe pneumonia ([address] 10/12/2017    Pulmonary emphysema with fibrosis of lung ([address] 11/15/2018    Right lower lobe pneumonia ([address] 11/15/2018    Recurrent 4/23/19    S/P lumbar fusion 6/1/2019    Thoracic ascending aortic aneurysm ([address] 11/15/2018    Proximal ascending aorta; 3.8 cm; 11/15/18       Past Surgical Hx :  Past Surgical History:   Procedure Laterality Date    ABSCESS DRAINAGE  2006    X2 RECTAL ABSCESS    BRONCHOSCOPY N/A 6/12/2019    BRONCHOSCOPY BRUSHINGS performed by Fabian Ruiz MD at [address] [facility] COLONOSCOPY  11/18/2013    LUMBAR FUSION 03/2019    [facility]       Labs:   CBC:   Lab Results   Component Value Date    WBC 10.1 06/13/2019    RBC 4.21 06/13/2019    HGB 12.3 06/13/2019    HCT 37.6 06/13/2019    MCV 89.3 06/13/2019    MCH 29.2 06/13/2019    MCHC 32.7 06/13/2019    RDW 14.8 06/13/2019     06/13/2019    MPV 8.9 06/13/2019     CBC with Differential:    Lab Results   Component Value Date    WBC 10.1 06/13/2019    RBC 4.21 06/13/2019    HGB 12.3 06/13/2019    HCT 37.6 06/13/2019     06/13/2019    MCV 89.3 06/13/2019    MCH 29.2 06/13/2019    MCHC 32.7 06/13/2019    RDW 14.8 06/13/2019    LYMPHOPCT 4.1 06/13/2019    MONOPCT 1.7 06/13/2019    BASOPCT 0.1 06/13/2019    MONOSABS 0.17 06/13/2019    LYMPHSABS 0.41 06/13/2019    EOSABS 0.00 06/13/2019    BASOSABS 0.01 06/13/2019     Hemoglobin/Hematocrit:    Lab Results   Component Value Date    HGB 12.3 06/13/2019    HCT 37.6 06/13/2019     CMP:    Lab Results   Component Value Date     06/14/2019    K 4.1 06/14/2019    K 4.4 04/22/2019    CL 98 06/14/2019    CO2 26 06/14/2019    BUN 15 06/14/2019    CREATININE 0.6 06/14/2019    GFRAA >60 06/14/2019    LABGLOM >60 06/14/2019    GLUCOSE 128 06/14/2019    PROT 6.5 05/30/2019    LABALBU 3.6 05/30/2019    CALCIUM 8.9 06/14/2019    BILITOT 0.9 05/30/2019    ALKPHOS 73 05/30/2019    AST 12 05/30/2019    ALT 7 05/30/2019     BMP:    Lab Results   Component Value Date     06/14/2019    K 4.1 06/14/2019    K 4.4 04/22/2019    CL 98 06/14/2019    CO2 26 06/14/2019    BUN 15 06/14/2019    LABALBU 3.6 05/30/2019    CREATININE 0.6 06/14/2019    CALCIUM 8.9 06/14/2019    GFRAA >60 06/14/2019    LABGLOM >60 06/14/2019    GLUCOSE 128 06/14/2019     Hepatic Function Panel:    Lab Results   Component Value Date    ALKPHOS 73 05/30/2019    ALT 7 05/30/2019    AST 12 05/30/2019    PROT 6.5 05/30/2019    BILITOT 0.9 05/30/2019    LABALBU 3.6 05/30/2019     Magnesium:    Lab Results   Component Value Date    MG 1.8 06/14/2019 Phosphorus:    Lab Results   Component Value Date    PHOS 2.9 06/13/2019     LDH:  No results found for: LDH  Uric Acid:    Lab Results   Component Value Date    LABURIC 3.5 05/31/2019     PT/INR:    Lab Results   Component Value Date    PROTIME 12.5 05/30/2019    INR 1.1 05/30/2019     Warfarin PT/INR:  No components found for: Amadeo Yañez  PTT:    Lab Results   Component Value Date    APTT 31.4 05/30/2019   [APTT}  Troponin:    Lab Results   Component Value Date    TROPONINI <0.01 06/09/2019     Last 3 Troponin:    Lab Results   Component Value Date    TROPONINI <0.01 06/09/2019    TROPONINI <0.01 05/31/2019    TROPONINI <0.01 05/28/2019     U/A:    Lab Results   Component Value Date    COLORU Yellow 06/09/2019    PROTEINU Negative 06/09/2019    PHUR 6.0 06/09/2019    LABCAST MANY 05/30/2019    WBCUA NONE 06/09/2019    RBCUA NONE 06/09/2019    MUCUS Present 04/22/2019    BACTERIA NONE 06/09/2019    CLARITYU Clear 06/09/2019    SPECGRAV 1.010 06/09/2019    LEUKOCYTESUR Negative 06/09/2019    UROBILINOGEN 0.2 06/09/2019    BILIRUBINUR LARGE 06/09/2019    BLOODU Negative 06/09/2019    GLUCOSEU Negative 06/09/2019     HgBA1c:    Lab Results   Component Value Date    LABA1C 5.4 06/09/2019     FLP:    Lab Results   Component Value Date    TRIG 35 06/10/2019    HDL 44 06/10/2019    LDLCALC 60 06/10/2019    LABVLDL 7 06/10/2019     TSH:    Lab Results   Component Value Date    TSH 0.489 06/10/2019     VITAMIN B12: No components found for: B12  FOLATE:    Lab Results   Component Value Date    FOLATE 11.4 06/09/2019          CBC:No results for input(s): WBC, RBC, HGB, HCT, PLT, MCV, MCH, MCHC, RDW, NRBC, SEGSPCT, BANDSPCT, BLASTSPCT, METASPCT, LYMPHOPCT, PROMYELOPCT, MONOPCT, MYELOPCT, EOSPCT, BASOPCT, MONOSABS, LYMPHSABS, EOSABS, BASOSABS, DIFFTYPE in the last 72 hours. Invalid input(s): [facility]       H & H :No results for input(s): HGB in the last 72 hours.     TSH:No results for input(s): TSH in the last 72 lungs suggesting of chronic obstructive pulmonary disease. There is interstitial fibrosis seen in both lung bases. There is flattening of both hemidiaphragms. There is no evidence of airspace consolidation or pulmonary venous congestion. There is pleural reaction seen in both costophrenic sulcus more prominent on the right as compared to the left. . The heart is normal in size and configuration. The trachea is in the midline. The mediastinum and appears normal. Is uncoiling atherosclerotic change of thoracic aorta. IMPRESSION: Chronic obstructive pulmonary disease Unchanged from prior radiograph     Ct Chest Wo Contrast    Result Date: 2019  Patient Name:  Harman De La Torre Patient MRN:  [id number] Patient :  1950 Patient Age:  79 years Patient Gender:  Male Order Date:2019 12:30 PM EXAM:  CT CHEST WO CONTRAST NUMBER OF IMAGES:  18 INDICATION:  Left upper lobe pneumonia COMPARISON: Anterior upright chest yesterday 1129 hours and two-view upright chest May 28, 2019, showing patchy density in the left mid lung on the more recent study TECHNIQUE:  Axial images were obtained from the apices of the lungs through the upper abdomen without contrast administration. Sagittal and coronal reconstructions performed for aid in interpretation of the study. Low-dose CT  acquisition technique included one of following options; 1 . Automated exposure control, 2. Adjustment of MA and or KV according to patient's size or 3. Use of iterative reconstruction. FINDINGS: Lung window images: There is severe subpleural and centrilobular emphysematous changes throughout the lungs, with a superimposed consolidative pneumonia in the left upper lung medially, along the anterior chest wall at about the level of the gualberto. There is no dependent effusion. There is some mild interstitial fibrosis or scarring in both lower lungs.  Soft tissue window images:  Soft tissue window images show no evidence of thoracic inlet or axillary adenopathy, but there is prominent AP window, pretracheal/retrocaval, and left periaortic adenopathy. Coronary artery calcification is present, but there is no evidence of cardiac enlargement or decompensation. There is elevation of the lateral right hemidiaphragm. Upper abdomen: Noncontrast imaging shows limited detail, but no visceral abnormalities are noted to the level of the mid kidneys Skeletal: Unremarkable     Severe emphysematous change of the lungs with a consolidative left upper lobe pneumonia along the anterior mediastinal border. There is no associated dependent effusion, but there is mediastinal adenopathy. Mri Lumbar Spine W Wo Contrast    Result Date: 2019  Patient MRN:  [id number] : 1950 Age: 79 years Gender: Male Order Date:  2019 5:00 PM TECHNIQUE/NUMBER OF IMAGES/COMPARISON/CLINICAL HISTORY: MRI lumbar spine T1-T2 STIR sequences were obtained T1 sequence done without and with IV contrast. Clinical history after fusion, for evaluation of epidural abscess. Images 213 IV contrast: 19 mL of ProHance. FINDINGS: Millicuries of level determination is based on counting of the CT thoracic spine of 2018 which is demonstrated 12 there is a redundant thoracic vertebrae, 5 no memory lumbar vertebrae and a transitional level in L5-S1 more likely partially sacralization of the first sacral spine segment with a rudimentary size disc between the transitional level and the united sacral spine, with the bilateral suleiman apophysis partially united to the sacral wings. Based on these level determination, patient had a recent the laminectomy and posterior fusion of the lumbar spine at the levels of L4 and L5, with interpedicular screws. The postcontrast images were not done with fat suppression but there is no conspicuous indication for an abnormal enhancements in the postcontrast study in the epidural spaces more than the enhancement in epidural veins.  There is a fluid accumulation in the site of the previous laminectomy measuring about 3.2 x 2.5 x 2.2 cm which cause some IMPRESSION the posterior aspect of the thecal sac but the canal is decompressed by the laminectomy. There is another fluid accumulation in the midline retrovertebral region within the subcutaneous fat, superficial to the posterior muscular fascia measuring 5.6 x 1.3 x 2.3 cm. Cannot see conspicuously postcontrast enhancement. This can represent a post operatory seroma/hematoma but the present study cannot be determined if they are infected or not infected. This requires correlation with clinical data. There are some areas of subchondral edema in the inferior endplate of L4 and superior endplate of L5 with some increased signal in the intervertebral disc but they cannot see conspicuous postcontrast enhancement. This more likely on degenerative changes from degenerative disc process. There are unremarkable appearance for the contents of the thecal sac. No abnormal intradural enhancements are seen. There is normal appearance for the distal thoracic spinal cord, conus medullaris and nerve roots. The canal is decompressed where laminectomy has been performed in between the pedicles of L4 and L5. At L5-transitional level there is some degree of spine canal stenosis in mild-to-moderate degree. This is below the level of the laminectomy. 1. Transitional vessel in the lumbar sacral junction, see above comments regarding the level determination. 2. Previous laminectomy L4 and L5 level with posterior fusion with interpedicular screws. 3. Post operatory changes with well delineated hematoma/seroma formation, one in the site of the laminectomy and the other in posterior retrovertebral midline subcutaneous fat. No conspicuous abnormal postcontrast enhancement identified. However can not determined by imaging if they are not or if they are infected. Clinical correlation needed. 4. No demonstration of an epidural abscess .  5. Subchondral upper lobe anteriorly is increased currently measuring 6.4 x 3.4 x 6.4 cm. Focal pneumonia versus malignancy are considered. 6 mm nodular density in the right upper lobe is noted. There is no pleural effusion. The liver is of normal architecture. The degenerative changes are identified in the thoracic spine. No central pulmonary embolism or aortic dissection. Extensive bullous emphysema with a progressive patchy masslike infiltrate in the left upper lobe with extensive lymphadenopathy in the mediastinum and hilum. Infectious inflammatory process like pneumonia is a consideration. Underlying malignancy is not excluded. Short interval follow-up examination with repeat CT scan in 6-8 weeks is recommended to see resolution. It persists, further assessment by biopsy is recommended. ALERT:  THIS IS AN ABNORMAL REPORT     Xr Eye Foreign Body    Result Date: 2019  Patient MRN: [id number] : 1950 Age:  79 years Gender: Male Order Date: 2019 2:30 PM Exam: XR EYE FOREIGN BODY Number of Images: 2 views Indication: Foreign body Comparison: None. Findings: No radiopaque foreign body is identified    No radiopaque foreign body is identified      Discharge Exam:  See progress note from today    Discharge Medication List as of 2019  4:32 PM      START taking these medications    Details   acyclovir (ZOVIRAX) 400 MG tablet Take 1 tablet by mouth 3 times daily for 5 days, Disp-15 tablet, R-0Normal      doxycycline hyclate (VIBRAMYCIN) 100 MG capsule Take 1 capsule by mouth every 12 hours for 10 days, Disp-20 capsule, R-0Normal      nystatin-triamcinolone (MYCOLOG) 564894-0.1 UNIT/GM-% ointment Apply topically 2 times daily. , Disp-30 g, R-1, Normal      benzonatate (TESSALON) 200 MG capsule Take 1 capsule by mouth 3 times daily for 7 days, Disp-21 capsule, R-0Normal      guaiFENesin-codeine (GUAIFENESIN AC) 100-10 MG/5ML liquid Take 5 mLs by mouth every 4 hours as needed for Cough for up to 3 days. , Disp-420 mL,

## 2019-07-05 ENCOUNTER — CARE COORDINATION (OUTPATIENT)
Dept: CASE MANAGEMENT | Age: 69
End: 2019-07-05

## 2019-07-08 ENCOUNTER — HOSPITAL ENCOUNTER (OUTPATIENT)
Age: 69
Discharge: HOME | End: 2019-07-08
Payer: MEDICARE

## 2019-07-08 PROCEDURE — 87070 CULTURE OTHR SPECIMN AEROBIC: CPT

## 2019-07-08 PROCEDURE — 87206 SMEAR FLUORESCENT/ACID STAI: CPT

## 2019-07-10 LAB
CULTURE, RESPIRATORY: NORMAL
SMEAR, RESPIRATORY: NORMAL

## 2019-07-15 ENCOUNTER — CARE COORDINATION (OUTPATIENT)
Dept: CASE MANAGEMENT | Age: 69
End: 2019-07-15

## 2019-07-15 LAB
FUNGUS (MYCOLOGY) CULTURE: NORMAL
FUNGUS STAIN: NORMAL

## 2019-07-26 ENCOUNTER — CARE COORDINATION (OUTPATIENT)
Dept: CASE MANAGEMENT | Age: 69
End: 2019-07-26

## 2019-07-26 NOTE — CARE COORDINATION
7/26/19 F/U call for [facility]. [facility]. Left name and number on patients VM for return contact. Will F/U at a later date.  Estrelal LÓPEZ RN

## 2019-07-30 LAB
AFB CULTURE (MYCOBACTERIA): NORMAL
AFB SMEAR: NORMAL

## 2019-08-14 LAB
Lab: NORMAL
REPORT: NORMAL
THIS TEST SENT TO: NORMAL

## 2019-08-19 ENCOUNTER — CARE COORDINATION (OUTPATIENT)
Dept: CASE MANAGEMENT | Age: 69
End: 2019-08-19

## 2019-08-19 LAB
Lab: NORMAL
REPORT: NORMAL
THIS TEST SENT TO: NORMAL

## 2019-08-20 ENCOUNTER — TELEPHONE (OUTPATIENT)
Dept: INFECTIOUS DISEASES | Age: 69
End: 2019-08-20

## 2019-08-21 LAB
FUNGUS (MYCOLOGY) CULTURE: ABNORMAL
FUNGUS STAIN: ABNORMAL
ORGANISM: ABNORMAL

## 2019-08-28 ENCOUNTER — CARE COORDINATION (OUTPATIENT)
Dept: CASE MANAGEMENT | Age: 69
End: 2019-08-28

## 2019-08-30 PROBLEM — A31.2: Status: ACTIVE | Noted: 2019-08-30

## 2019-09-03 ENCOUNTER — HOSPITAL ENCOUNTER (OUTPATIENT)
Dept: INFUSION THERAPY | Age: 69
Setting detail: INFUSION SERIES
Discharge: HOME | End: 2019-09-03
Payer: MEDICARE

## 2019-09-03 VITALS
HEART RATE: 77 BPM | SYSTOLIC BLOOD PRESSURE: 131 MMHG | OXYGEN SATURATION: 95 % | TEMPERATURE: 97.8 F | HEIGHT: 73 IN | RESPIRATION RATE: 16 BRPM | WEIGHT: 205 LBS | BODY MASS INDEX: 27.17 KG/M2 | DIASTOLIC BLOOD PRESSURE: 82 MMHG

## 2019-09-03 DIAGNOSIS — A31.2 DISSEMINATED MYCOBACTERIUM AVIUM-INTRACELLULARE COMPLEX (HCC): Primary | ICD-10-CM

## 2019-09-03 LAB
ALBUMIN SERPL-MCNC: 4 G/DL (ref 3.5–5.2)
ALP BLD-CCNC: 79 U/L (ref 40–129)
ALT SERPL-CCNC: 7 U/L (ref 0–40)
ANION GAP SERPL CALCULATED.3IONS-SCNC: 11 MMOL/L (ref 7–16)
AST SERPL-CCNC: 11 U/L (ref 0–39)
BASOPHILS ABSOLUTE: 0.04 E9/L (ref 0–0.2)
BASOPHILS RELATIVE PERCENT: 0.5 % (ref 0–2)
BILIRUB SERPL-MCNC: 0.5 MG/DL (ref 0–1.2)
BUN BLDV-MCNC: 12 MG/DL (ref 8–23)
C-REACTIVE PROTEIN: 2.4 MG/DL (ref 0–0.4)
CALCIUM SERPL-MCNC: 9.8 MG/DL (ref 8.6–10.2)
CHLORIDE BLD-SCNC: 106 MMOL/L (ref 98–107)
CO2: 24 MMOL/L (ref 22–29)
CREAT SERPL-MCNC: 0.6 MG/DL (ref 0.7–1.2)
EOSINOPHILS ABSOLUTE: 0.17 E9/L (ref 0.05–0.5)
EOSINOPHILS RELATIVE PERCENT: 2.2 % (ref 0–6)
GFR AFRICAN AMERICAN: >60
GFR NON-AFRICAN AMERICAN: >60 ML/MIN/1.73
GLUCOSE BLD-MCNC: 84 MG/DL (ref 74–99)
HCT VFR BLD CALC: 46.9 % (ref 37–54)
HEMOGLOBIN: 15.3 G/DL (ref 12.5–16.5)
IMMATURE GRANULOCYTES #: 0.03 E9/L
IMMATURE GRANULOCYTES %: 0.4 % (ref 0–5)
LYMPHOCYTES ABSOLUTE: 1.28 E9/L (ref 1.5–4)
LYMPHOCYTES RELATIVE PERCENT: 16.6 % (ref 20–42)
Lab: NORMAL
MCH RBC QN AUTO: 28.9 PG (ref 26–35)
MCHC RBC AUTO-ENTMCNC: 32.6 % (ref 32–34.5)
MCV RBC AUTO: 88.7 FL (ref 80–99.9)
MONOCYTES ABSOLUTE: 0.6 E9/L (ref 0.1–0.95)
MONOCYTES RELATIVE PERCENT: 7.8 % (ref 2–12)
NEUTROPHILS ABSOLUTE: 5.59 E9/L (ref 1.8–7.3)
NEUTROPHILS RELATIVE PERCENT: 72.5 % (ref 43–80)
PDW BLD-RTO: 13.8 FL (ref 11.5–15)
PLATELET # BLD: 226 E9/L (ref 130–450)
PMV BLD AUTO: 10.2 FL (ref 7–12)
POTASSIUM SERPL-SCNC: 4.2 MMOL/L (ref 3.5–5)
RBC # BLD: 5.29 E12/L (ref 3.8–5.8)
REPORT: NORMAL
SEDIMENTATION RATE, ERYTHROCYTE: 3 MM/HR (ref 0–15)
SODIUM BLD-SCNC: 141 MMOL/L (ref 132–146)
THIS TEST SENT TO: NORMAL
TOTAL PROTEIN: 6.8 G/DL (ref 6.4–8.3)
WBC # BLD: 7.7 E9/L (ref 4.5–11.5)

## 2019-09-03 PROCEDURE — C1751 CATH, INF, PER/CENT/MIDLINE: HCPCS

## 2019-09-03 PROCEDURE — 6360000002 HC RX W HCPCS: Performed by: SPECIALIST

## 2019-09-03 PROCEDURE — 96365 THER/PROPH/DIAG IV INF INIT: CPT

## 2019-09-03 PROCEDURE — 76937 US GUIDE VASCULAR ACCESS: CPT

## 2019-09-03 PROCEDURE — 2500000003 HC RX 250 WO HCPCS: Performed by: SPECIALIST

## 2019-09-03 PROCEDURE — 80053 COMPREHEN METABOLIC PANEL: CPT

## 2019-09-03 PROCEDURE — 86140 C-REACTIVE PROTEIN: CPT

## 2019-09-03 PROCEDURE — 36415 COLL VENOUS BLD VENIPUNCTURE: CPT

## 2019-09-03 PROCEDURE — 85651 RBC SED RATE NONAUTOMATED: CPT

## 2019-09-03 PROCEDURE — 36569 INSJ PICC 5 YR+ W/O IMAGING: CPT

## 2019-09-03 PROCEDURE — 2580000003 HC RX 258: Performed by: SPECIALIST

## 2019-09-03 PROCEDURE — 85025 COMPLETE CBC W/AUTO DIFF WBC: CPT

## 2019-09-03 RX ORDER — SODIUM CHLORIDE 0.9 % (FLUSH) 0.9 %
10 SYRINGE (ML) INJECTION PRN
Status: CANCELLED
Start: 2019-09-04

## 2019-09-03 RX ORDER — DIPHENHYDRAMINE HCL 25 MG
50 TABLET ORAL ONCE
Status: CANCELLED
Start: 2019-09-04

## 2019-09-03 RX ORDER — CLARITHROMYCIN 500 MG/1
500 TABLET, COATED ORAL 2 TIMES DAILY
Status: ON HOLD | COMMUNITY
End: 2019-10-07 | Stop reason: HOSPADM

## 2019-09-03 RX ORDER — DIPHENHYDRAMINE HCL 25 MG
50 TABLET ORAL ONCE
Status: CANCELLED
Start: 2019-09-03

## 2019-09-03 RX ORDER — DIPHENHYDRAMINE HYDROCHLORIDE 50 MG/ML
50 INJECTION INTRAMUSCULAR; INTRAVENOUS ONCE
Status: CANCELLED
Start: 2019-09-04

## 2019-09-03 RX ORDER — DIPHENHYDRAMINE HCL 25 MG
25 TABLET ORAL PRN
Status: CANCELLED
Start: 2019-09-04

## 2019-09-03 RX ORDER — SODIUM CHLORIDE 0.9 % (FLUSH) 0.9 %
10 SYRINGE (ML) INJECTION PRN
Status: CANCELLED
Start: 2019-09-03

## 2019-09-03 RX ORDER — HEPARIN SODIUM (PORCINE) LOCK FLUSH IV SOLN 100 UNIT/ML 100 UNIT/ML
3 SOLUTION INTRAVENOUS PRN
Status: CANCELLED | OUTPATIENT
Start: 2019-09-03

## 2019-09-03 RX ORDER — LIDOCAINE HYDROCHLORIDE 10 MG/ML
5 INJECTION, SOLUTION EPIDURAL; INFILTRATION; INTRACAUDAL; PERINEURAL ONCE
Status: COMPLETED | OUTPATIENT
Start: 2019-09-03 | End: 2019-09-03

## 2019-09-03 RX ORDER — SODIUM CHLORIDE 0.9 % (FLUSH) 0.9 %
10 SYRINGE (ML) INJECTION PRN
Status: DISCONTINUED | OUTPATIENT
Start: 2019-09-03 | End: 2019-09-04 | Stop reason: HOSPADM

## 2019-09-03 RX ORDER — EPINEPHRINE 1 MG/ML
0.3 INJECTION, SOLUTION, CONCENTRATE INTRAVENOUS PRN
Status: CANCELLED
Start: 2019-09-04

## 2019-09-03 RX ORDER — HEPARIN SODIUM (PORCINE) LOCK FLUSH IV SOLN 100 UNIT/ML 100 UNIT/ML
3 SOLUTION INTRAVENOUS EVERY 12 HOURS SCHEDULED
Status: DISCONTINUED | OUTPATIENT
Start: 2019-09-03 | End: 2019-09-04 | Stop reason: HOSPADM

## 2019-09-03 RX ORDER — EPINEPHRINE 1 MG/ML
0.3 INJECTION, SOLUTION, CONCENTRATE INTRAVENOUS PRN
Status: CANCELLED
Start: 2019-09-03

## 2019-09-03 RX ORDER — DIPHENHYDRAMINE HYDROCHLORIDE 50 MG/ML
50 INJECTION INTRAMUSCULAR; INTRAVENOUS ONCE
Status: CANCELLED
Start: 2019-09-03

## 2019-09-03 RX ORDER — DIPHENHYDRAMINE HCL 25 MG
25 TABLET ORAL PRN
Status: CANCELLED
Start: 2019-09-03

## 2019-09-03 RX ORDER — ETHAMBUTOL HYDROCHLORIDE 400 MG/1
400 TABLET, FILM COATED ORAL 3 TIMES DAILY
Status: ON HOLD | COMMUNITY
End: 2019-10-07 | Stop reason: HOSPADM

## 2019-09-03 RX ADMIN — Medication 300 UNITS: at 11:17

## 2019-09-03 RX ADMIN — LIDOCAINE HYDROCHLORIDE 2 ML: 10 INJECTION, SOLUTION EPIDURAL; INFILTRATION; INTRACAUDAL; PERINEURAL at 09:20

## 2019-09-03 RX ADMIN — Medication 10 ML: at 11:17

## 2019-09-03 RX ADMIN — Medication 10 ML: at 10:12

## 2019-09-03 RX ADMIN — Medication 10 ML: at 10:10

## 2019-09-03 RX ADMIN — Medication 10 ML: at 11:16

## 2019-09-03 ASSESSMENT — PAIN - FUNCTIONAL ASSESSMENT: PAIN_FUNCTIONAL_ASSESSMENT: PREVENTS OR INTERFERES SOME ACTIVE ACTIVITIES AND ADLS

## 2019-09-03 ASSESSMENT — PAIN DESCRIPTION - PROGRESSION: CLINICAL_PROGRESSION: NOT CHANGED

## 2019-09-03 ASSESSMENT — PAIN SCALES - GENERAL: PAINLEVEL_OUTOF10: 2

## 2019-09-03 ASSESSMENT — PAIN DESCRIPTION - LOCATION: LOCATION: HIP

## 2019-09-03 ASSESSMENT — PAIN DESCRIPTION - ONSET: ONSET: GRADUAL

## 2019-09-03 ASSESSMENT — PAIN DESCRIPTION - DESCRIPTORS: DESCRIPTORS: STABBING

## 2019-09-03 ASSESSMENT — PAIN DESCRIPTION - FREQUENCY: FREQUENCY: INTERMITTENT

## 2019-09-03 ASSESSMENT — PAIN DESCRIPTION - PAIN TYPE: TYPE: CHRONIC PAIN

## 2019-09-03 ASSESSMENT — PAIN DESCRIPTION - ORIENTATION: ORIENTATION: LEFT

## 2019-09-03 NOTE — PROCEDURES
Picc    Catheter insertion date 9/3/2019     Product Number:  [id number]   Lot No: [id number]   Gauge: 18g/4fr   Lumen: single   L Basilic    Vein Diameter : 0.78cm   Upper arm circumference (10CM ABOVE AC): 34cm   Catheter Length : 50cm   Internal Length: 49cm   External Catheter Length: 1cm   Ultrasound Used:yes  VPS Blue [place] confirms PICC tip is placed in the lower 1/3 of the SVC or at the Cavoatrial junction. Floor nurse notified PICC is okay to use.    : Maritza Jules RN

## 2019-09-04 ENCOUNTER — HOSPITAL ENCOUNTER (OUTPATIENT)
Dept: INFUSION THERAPY | Age: 69
Setting detail: INFUSION SERIES
Discharge: HOME | End: 2019-09-04
Payer: MEDICARE

## 2019-09-04 VITALS
DIASTOLIC BLOOD PRESSURE: 76 MMHG | BODY MASS INDEX: 27.17 KG/M2 | WEIGHT: 205 LBS | OXYGEN SATURATION: 92 % | TEMPERATURE: 98 F | HEIGHT: 73 IN | SYSTOLIC BLOOD PRESSURE: 117 MMHG | HEART RATE: 78 BPM | RESPIRATION RATE: 18 BRPM

## 2019-09-04 DIAGNOSIS — A31.2 DISSEMINATED MYCOBACTERIUM AVIUM-INTRACELLULARE COMPLEX (HCC): Primary | ICD-10-CM

## 2019-09-04 PROCEDURE — 2580000003 HC RX 258: Performed by: SPECIALIST

## 2019-09-04 PROCEDURE — 96365 THER/PROPH/DIAG IV INF INIT: CPT

## 2019-09-04 PROCEDURE — 6360000002 HC RX W HCPCS: Performed by: SPECIALIST

## 2019-09-04 RX ORDER — SODIUM CHLORIDE 0.9 % (FLUSH) 0.9 %
10 SYRINGE (ML) INJECTION PRN
Status: DISCONTINUED | OUTPATIENT
Start: 2019-09-04 | End: 2019-09-05 | Stop reason: HOSPADM

## 2019-09-04 RX ORDER — SODIUM CHLORIDE 0.9 % (FLUSH) 0.9 %
10 SYRINGE (ML) INJECTION PRN
Status: CANCELLED
Start: 2019-09-05

## 2019-09-04 RX ORDER — EPINEPHRINE 1 MG/ML
0.3 INJECTION, SOLUTION, CONCENTRATE INTRAVENOUS PRN
Status: CANCELLED
Start: 2019-09-05

## 2019-09-04 RX ORDER — DIPHENHYDRAMINE HYDROCHLORIDE 50 MG/ML
50 INJECTION INTRAMUSCULAR; INTRAVENOUS ONCE
Status: CANCELLED
Start: 2019-09-05

## 2019-09-04 RX ORDER — DIPHENHYDRAMINE HCL 25 MG
50 TABLET ORAL ONCE
Status: CANCELLED
Start: 2019-09-05

## 2019-09-04 RX ORDER — DIPHENHYDRAMINE HCL 25 MG
25 TABLET ORAL PRN
Status: CANCELLED
Start: 2019-09-05

## 2019-09-04 RX ADMIN — Medication 10 ML: at 07:32

## 2019-09-04 RX ADMIN — Medication 300 UNITS: at 08:35

## 2019-09-04 RX ADMIN — Medication 10 ML: at 08:33

## 2019-09-04 RX ADMIN — Medication 10 ML: at 08:34

## 2019-09-04 ASSESSMENT — PAIN SCALES - GENERAL: PAINLEVEL_OUTOF10: 0

## 2019-09-04 NOTE — PROGRESS NOTES
Labs from 9/3/19 faxed to Dr. Mccoy [facility] office for review

## 2019-09-05 ENCOUNTER — HOSPITAL ENCOUNTER (OUTPATIENT)
Dept: INFUSION THERAPY | Age: 69
Setting detail: INFUSION SERIES
Discharge: HOME | End: 2019-09-05
Payer: MEDICARE

## 2019-09-05 VITALS
DIASTOLIC BLOOD PRESSURE: 79 MMHG | RESPIRATION RATE: 18 BRPM | WEIGHT: 205 LBS | OXYGEN SATURATION: 93 % | SYSTOLIC BLOOD PRESSURE: 127 MMHG | HEART RATE: 74 BPM | TEMPERATURE: 97.6 F | BODY MASS INDEX: 27.05 KG/M2

## 2019-09-05 DIAGNOSIS — A31.2 DISSEMINATED MYCOBACTERIUM AVIUM-INTRACELLULARE COMPLEX (HCC): Primary | ICD-10-CM

## 2019-09-05 LAB
ALBUMIN SERPL-MCNC: 4.3 G/DL (ref 3.5–5.2)
ALP BLD-CCNC: 72 U/L (ref 40–129)
ALT SERPL-CCNC: 7 U/L (ref 0–40)
AMIKACIN DOSE: ABNORMAL
AMIKACIN TROUGH: <0.8 MCG/ML (ref 4–8)
ANION GAP SERPL CALCULATED.3IONS-SCNC: 11 MMOL/L (ref 7–16)
AST SERPL-CCNC: 13 U/L (ref 0–39)
BASOPHILS ABSOLUTE: 0.03 E9/L (ref 0–0.2)
BASOPHILS RELATIVE PERCENT: 0.6 % (ref 0–2)
BILIRUB SERPL-MCNC: 0.4 MG/DL (ref 0–1.2)
BUN BLDV-MCNC: 11 MG/DL (ref 8–23)
CALCIUM SERPL-MCNC: 10 MG/DL (ref 8.6–10.2)
CHLORIDE BLD-SCNC: 102 MMOL/L (ref 98–107)
CO2: 25 MMOL/L (ref 22–29)
CREAT SERPL-MCNC: 0.7 MG/DL (ref 0.7–1.2)
EOSINOPHILS ABSOLUTE: 0.2 E9/L (ref 0.05–0.5)
EOSINOPHILS RELATIVE PERCENT: 4.2 % (ref 0–6)
GFR AFRICAN AMERICAN: >60
GFR NON-AFRICAN AMERICAN: >60 ML/MIN/1.73
GLUCOSE BLD-MCNC: 83 MG/DL (ref 74–99)
HCT VFR BLD CALC: 47.5 % (ref 37–54)
HEMOGLOBIN: 15.8 G/DL (ref 12.5–16.5)
IMMATURE GRANULOCYTES #: 0.01 E9/L
IMMATURE GRANULOCYTES %: 0.2 % (ref 0–5)
LYMPHOCYTES ABSOLUTE: 1.33 E9/L (ref 1.5–4)
LYMPHOCYTES RELATIVE PERCENT: 27.8 % (ref 20–42)
MCH RBC QN AUTO: 29.5 PG (ref 26–35)
MCHC RBC AUTO-ENTMCNC: 33.3 % (ref 32–34.5)
MCV RBC AUTO: 88.6 FL (ref 80–99.9)
MONOCYTES ABSOLUTE: 0.4 E9/L (ref 0.1–0.95)
MONOCYTES RELATIVE PERCENT: 8.4 % (ref 2–12)
NEUTROPHILS ABSOLUTE: 2.81 E9/L (ref 1.8–7.3)
NEUTROPHILS RELATIVE PERCENT: 58.8 % (ref 43–80)
PDW BLD-RTO: 13.7 FL (ref 11.5–15)
PLATELET # BLD: 228 E9/L (ref 130–450)
PMV BLD AUTO: 9.9 FL (ref 7–12)
POTASSIUM SERPL-SCNC: 4 MMOL/L (ref 3.5–5)
RBC # BLD: 5.36 E12/L (ref 3.8–5.8)
SODIUM BLD-SCNC: 138 MMOL/L (ref 132–146)
TOTAL PROTEIN: 7 G/DL (ref 6.4–8.3)
WBC # BLD: 4.8 E9/L (ref 4.5–11.5)

## 2019-09-05 PROCEDURE — 6360000002 HC RX W HCPCS: Performed by: SPECIALIST

## 2019-09-05 PROCEDURE — 2580000003 HC RX 258: Performed by: SPECIALIST

## 2019-09-05 PROCEDURE — 80150 ASSAY OF AMIKACIN: CPT

## 2019-09-05 PROCEDURE — 96365 THER/PROPH/DIAG IV INF INIT: CPT

## 2019-09-05 PROCEDURE — 85025 COMPLETE CBC W/AUTO DIFF WBC: CPT

## 2019-09-05 PROCEDURE — 36415 COLL VENOUS BLD VENIPUNCTURE: CPT

## 2019-09-05 PROCEDURE — 80053 COMPREHEN METABOLIC PANEL: CPT

## 2019-09-05 RX ORDER — EPINEPHRINE 1 MG/ML
0.3 INJECTION, SOLUTION, CONCENTRATE INTRAVENOUS PRN
Status: CANCELLED
Start: 2019-09-06

## 2019-09-05 RX ORDER — DIPHENHYDRAMINE HCL 25 MG
25 TABLET ORAL PRN
Status: CANCELLED
Start: 2019-09-06

## 2019-09-05 RX ORDER — DIPHENHYDRAMINE HCL 25 MG
50 TABLET ORAL ONCE
Status: CANCELLED
Start: 2019-09-06

## 2019-09-05 RX ORDER — DIPHENHYDRAMINE HYDROCHLORIDE 50 MG/ML
50 INJECTION INTRAMUSCULAR; INTRAVENOUS ONCE
Status: CANCELLED
Start: 2019-09-06

## 2019-09-05 RX ORDER — SODIUM CHLORIDE 0.9 % (FLUSH) 0.9 %
10 SYRINGE (ML) INJECTION PRN
Status: CANCELLED
Start: 2019-09-06

## 2019-09-05 RX ORDER — SODIUM CHLORIDE 0.9 % (FLUSH) 0.9 %
10 SYRINGE (ML) INJECTION PRN
Status: DISCONTINUED | OUTPATIENT
Start: 2019-09-05 | End: 2019-09-06 | Stop reason: HOSPADM

## 2019-09-05 RX ADMIN — Medication 10 ML: at 08:48

## 2019-09-05 RX ADMIN — Medication 10 ML: at 08:47

## 2019-09-05 RX ADMIN — Medication 10 ML: at 07:27

## 2019-09-05 RX ADMIN — Medication 300 UNITS: at 08:49

## 2019-09-05 RX ADMIN — Medication 10 ML: at 07:26

## 2019-09-06 ENCOUNTER — HOSPITAL ENCOUNTER (OUTPATIENT)
Dept: INFUSION THERAPY | Age: 69
Setting detail: INFUSION SERIES
Discharge: HOME | End: 2019-09-06
Payer: MEDICARE

## 2019-09-06 VITALS
TEMPERATURE: 97.7 F | RESPIRATION RATE: 18 BRPM | DIASTOLIC BLOOD PRESSURE: 67 MMHG | SYSTOLIC BLOOD PRESSURE: 104 MMHG | WEIGHT: 205 LBS | BODY MASS INDEX: 27.05 KG/M2 | OXYGEN SATURATION: 93 % | HEART RATE: 82 BPM

## 2019-09-06 DIAGNOSIS — A31.2 DISSEMINATED MYCOBACTERIUM AVIUM-INTRACELLULARE COMPLEX (HCC): Primary | ICD-10-CM

## 2019-09-06 LAB
AFB CULTURE (MYCOBACTERIA): ABNORMAL
AFB SMEAR: ABNORMAL
ORGANISM: ABNORMAL

## 2019-09-06 PROCEDURE — 6360000002 HC RX W HCPCS: Performed by: SPECIALIST

## 2019-09-06 PROCEDURE — 2580000003 HC RX 258: Performed by: SPECIALIST

## 2019-09-06 PROCEDURE — 96365 THER/PROPH/DIAG IV INF INIT: CPT

## 2019-09-06 RX ORDER — DIPHENHYDRAMINE HCL 25 MG
50 TABLET ORAL ONCE
Status: CANCELLED
Start: 2019-09-07

## 2019-09-06 RX ORDER — SODIUM CHLORIDE 0.9 % (FLUSH) 0.9 %
10 SYRINGE (ML) INJECTION PRN
Status: DISCONTINUED | OUTPATIENT
Start: 2019-09-06 | End: 2019-09-07 | Stop reason: HOSPADM

## 2019-09-06 RX ORDER — DIPHENHYDRAMINE HCL 25 MG
25 TABLET ORAL PRN
Status: CANCELLED
Start: 2019-09-07

## 2019-09-06 RX ORDER — SODIUM CHLORIDE 0.9 % (FLUSH) 0.9 %
10 SYRINGE (ML) INJECTION PRN
Status: CANCELLED
Start: 2019-09-07

## 2019-09-06 RX ORDER — HEPARIN SODIUM (PORCINE) LOCK FLUSH IV SOLN 100 UNIT/ML 100 UNIT/ML
300 SOLUTION INTRAVENOUS PRN
Status: DISCONTINUED | OUTPATIENT
Start: 2019-09-06 | End: 2019-09-07 | Stop reason: HOSPADM

## 2019-09-06 RX ORDER — EPINEPHRINE 1 MG/ML
0.3 INJECTION, SOLUTION, CONCENTRATE INTRAVENOUS PRN
Status: CANCELLED
Start: 2019-09-07

## 2019-09-06 RX ORDER — DIPHENHYDRAMINE HYDROCHLORIDE 50 MG/ML
50 INJECTION INTRAMUSCULAR; INTRAVENOUS ONCE
Status: CANCELLED
Start: 2019-09-07

## 2019-09-06 RX ADMIN — AMIKACIN SULFATE 1400 MG: 250 INJECTION, SOLUTION INTRAMUSCULAR; INTRAVENOUS at 07:27

## 2019-09-06 RX ADMIN — Medication 10 ML: at 08:39

## 2019-09-06 RX ADMIN — Medication 10 ML: at 08:38

## 2019-09-06 RX ADMIN — Medication 300 UNITS: at 08:39

## 2019-09-06 RX ADMIN — Medication 10 ML: at 07:27

## 2019-09-07 ENCOUNTER — HOSPITAL ENCOUNTER (OUTPATIENT)
Dept: INFUSION THERAPY | Age: 69
Setting detail: INFUSION SERIES
Discharge: HOME | End: 2019-09-07
Payer: MEDICARE

## 2019-09-07 VITALS
RESPIRATION RATE: 18 BRPM | WEIGHT: 205 LBS | SYSTOLIC BLOOD PRESSURE: 117 MMHG | HEIGHT: 73 IN | HEART RATE: 77 BPM | OXYGEN SATURATION: 94 % | TEMPERATURE: 97.6 F | BODY MASS INDEX: 27.17 KG/M2 | DIASTOLIC BLOOD PRESSURE: 67 MMHG

## 2019-09-07 DIAGNOSIS — A31.2 DISSEMINATED MYCOBACTERIUM AVIUM-INTRACELLULARE COMPLEX (HCC): Primary | ICD-10-CM

## 2019-09-07 PROCEDURE — 2580000003 HC RX 258: Performed by: SPECIALIST

## 2019-09-07 PROCEDURE — 6360000002 HC RX W HCPCS: Performed by: SPECIALIST

## 2019-09-07 PROCEDURE — 96365 THER/PROPH/DIAG IV INF INIT: CPT

## 2019-09-07 RX ORDER — DIPHENHYDRAMINE HCL 25 MG
25 TABLET ORAL PRN
Status: CANCELLED
Start: 2019-09-08

## 2019-09-07 RX ORDER — SODIUM CHLORIDE 0.9 % (FLUSH) 0.9 %
10 SYRINGE (ML) INJECTION PRN
Status: CANCELLED
Start: 2019-09-08

## 2019-09-07 RX ORDER — SODIUM CHLORIDE 0.9 % (FLUSH) 0.9 %
10 SYRINGE (ML) INJECTION PRN
Status: DISCONTINUED | OUTPATIENT
Start: 2019-09-07 | End: 2019-09-08 | Stop reason: HOSPADM

## 2019-09-07 RX ORDER — EPINEPHRINE 1 MG/ML
0.3 INJECTION, SOLUTION, CONCENTRATE INTRAVENOUS PRN
Status: CANCELLED
Start: 2019-09-08

## 2019-09-07 RX ORDER — DIPHENHYDRAMINE HCL 25 MG
50 TABLET ORAL ONCE
Status: CANCELLED
Start: 2019-09-08

## 2019-09-07 RX ORDER — DIPHENHYDRAMINE HYDROCHLORIDE 50 MG/ML
50 INJECTION INTRAMUSCULAR; INTRAVENOUS ONCE
Status: CANCELLED
Start: 2019-09-08

## 2019-09-07 RX ADMIN — Medication 10 ML: at 08:30

## 2019-09-07 RX ADMIN — Medication 300 UNITS: at 08:31

## 2019-09-07 RX ADMIN — Medication 10 ML: at 07:19

## 2019-09-07 RX ADMIN — Medication 10 ML: at 08:31

## 2019-09-07 RX ADMIN — AMIKACIN SULFATE 1400 MG: 250 INJECTION, SOLUTION INTRAMUSCULAR; INTRAVENOUS at 07:25

## 2019-09-07 ASSESSMENT — PAIN DESCRIPTION - PROGRESSION: CLINICAL_PROGRESSION: NOT CHANGED

## 2019-09-07 ASSESSMENT — PAIN DESCRIPTION - ORIENTATION: ORIENTATION: LEFT

## 2019-09-07 ASSESSMENT — PAIN DESCRIPTION - PAIN TYPE: TYPE: CHRONIC PAIN

## 2019-09-07 ASSESSMENT — PAIN DESCRIPTION - LOCATION: LOCATION: HIP;KNEE

## 2019-09-07 ASSESSMENT — PAIN DESCRIPTION - FREQUENCY: FREQUENCY: INTERMITTENT

## 2019-09-07 ASSESSMENT — PAIN DESCRIPTION - ONSET: ONSET: ON-GOING

## 2019-09-07 ASSESSMENT — PAIN SCALES - GENERAL: PAINLEVEL_OUTOF10: 3

## 2019-09-08 ENCOUNTER — HOSPITAL ENCOUNTER (OUTPATIENT)
Dept: INFUSION THERAPY | Age: 69
Setting detail: INFUSION SERIES
Discharge: HOME | End: 2019-09-08
Payer: MEDICARE

## 2019-09-08 VITALS
WEIGHT: 205 LBS | HEART RATE: 84 BPM | HEIGHT: 73 IN | TEMPERATURE: 97.5 F | RESPIRATION RATE: 18 BRPM | SYSTOLIC BLOOD PRESSURE: 126 MMHG | OXYGEN SATURATION: 92 % | BODY MASS INDEX: 27.17 KG/M2 | DIASTOLIC BLOOD PRESSURE: 69 MMHG

## 2019-09-08 DIAGNOSIS — A31.2 DISSEMINATED MYCOBACTERIUM AVIUM-INTRACELLULARE COMPLEX (HCC): Primary | ICD-10-CM

## 2019-09-08 LAB
ALBUMIN SERPL-MCNC: 4.1 G/DL (ref 3.5–5.2)
ALP BLD-CCNC: 70 U/L (ref 40–129)
ALT SERPL-CCNC: 8 U/L (ref 0–40)
ANION GAP SERPL CALCULATED.3IONS-SCNC: 11 MMOL/L (ref 7–16)
AST SERPL-CCNC: 13 U/L (ref 0–39)
BASOPHILS ABSOLUTE: 0.04 E9/L (ref 0–0.2)
BASOPHILS RELATIVE PERCENT: 0.8 % (ref 0–2)
BILIRUB SERPL-MCNC: 0.3 MG/DL (ref 0–1.2)
BUN BLDV-MCNC: 12 MG/DL (ref 8–23)
C-REACTIVE PROTEIN: 0.6 MG/DL (ref 0–0.4)
CALCIUM SERPL-MCNC: 9.6 MG/DL (ref 8.6–10.2)
CHLORIDE BLD-SCNC: 103 MMOL/L (ref 98–107)
CO2: 25 MMOL/L (ref 22–29)
CREAT SERPL-MCNC: 0.7 MG/DL (ref 0.7–1.2)
EOSINOPHILS ABSOLUTE: 0.27 E9/L (ref 0.05–0.5)
EOSINOPHILS RELATIVE PERCENT: 5.4 % (ref 0–6)
GFR AFRICAN AMERICAN: >60
GFR NON-AFRICAN AMERICAN: >60 ML/MIN/1.73
GLUCOSE BLD-MCNC: 99 MG/DL (ref 74–99)
HCT VFR BLD CALC: 47 % (ref 37–54)
HEMOGLOBIN: 15.6 G/DL (ref 12.5–16.5)
IMMATURE GRANULOCYTES #: 0.02 E9/L
IMMATURE GRANULOCYTES %: 0.4 % (ref 0–5)
LYMPHOCYTES ABSOLUTE: 1.3 E9/L (ref 1.5–4)
LYMPHOCYTES RELATIVE PERCENT: 26.2 % (ref 20–42)
MCH RBC QN AUTO: 29.3 PG (ref 26–35)
MCHC RBC AUTO-ENTMCNC: 33.2 % (ref 32–34.5)
MCV RBC AUTO: 88.2 FL (ref 80–99.9)
MONOCYTES ABSOLUTE: 0.37 E9/L (ref 0.1–0.95)
MONOCYTES RELATIVE PERCENT: 7.4 % (ref 2–12)
NEUTROPHILS ABSOLUTE: 2.97 E9/L (ref 1.8–7.3)
NEUTROPHILS RELATIVE PERCENT: 59.8 % (ref 43–80)
PDW BLD-RTO: 13.7 FL (ref 11.5–15)
PLATELET # BLD: 197 E9/L (ref 130–450)
PMV BLD AUTO: 9.3 FL (ref 7–12)
POTASSIUM SERPL-SCNC: 3.8 MMOL/L (ref 3.5–5)
RBC # BLD: 5.33 E12/L (ref 3.8–5.8)
SEDIMENTATION RATE, ERYTHROCYTE: 5 MM/HR (ref 0–15)
SODIUM BLD-SCNC: 139 MMOL/L (ref 132–146)
TOTAL PROTEIN: 6.7 G/DL (ref 6.4–8.3)
WBC # BLD: 5 E9/L (ref 4.5–11.5)

## 2019-09-08 PROCEDURE — 86140 C-REACTIVE PROTEIN: CPT

## 2019-09-08 PROCEDURE — 80053 COMPREHEN METABOLIC PANEL: CPT

## 2019-09-08 PROCEDURE — 96365 THER/PROPH/DIAG IV INF INIT: CPT

## 2019-09-08 PROCEDURE — 80150 ASSAY OF AMIKACIN: CPT

## 2019-09-08 PROCEDURE — 85651 RBC SED RATE NONAUTOMATED: CPT

## 2019-09-08 PROCEDURE — 85025 COMPLETE CBC W/AUTO DIFF WBC: CPT

## 2019-09-08 PROCEDURE — 36415 COLL VENOUS BLD VENIPUNCTURE: CPT

## 2019-09-08 PROCEDURE — 2580000003 HC RX 258: Performed by: SPECIALIST

## 2019-09-08 PROCEDURE — 6360000002 HC RX W HCPCS: Performed by: SPECIALIST

## 2019-09-08 RX ORDER — DIPHENHYDRAMINE HCL 25 MG
50 TABLET ORAL ONCE
Status: CANCELLED
Start: 2019-09-09

## 2019-09-08 RX ORDER — SODIUM CHLORIDE 0.9 % (FLUSH) 0.9 %
10 SYRINGE (ML) INJECTION PRN
Status: DISCONTINUED | OUTPATIENT
Start: 2019-09-08 | End: 2019-09-09 | Stop reason: HOSPADM

## 2019-09-08 RX ORDER — SODIUM CHLORIDE 0.9 % (FLUSH) 0.9 %
10 SYRINGE (ML) INJECTION PRN
Status: CANCELLED
Start: 2019-09-09

## 2019-09-08 RX ORDER — DIPHENHYDRAMINE HCL 25 MG
25 TABLET ORAL PRN
Status: CANCELLED
Start: 2019-09-09

## 2019-09-08 RX ORDER — DIPHENHYDRAMINE HYDROCHLORIDE 50 MG/ML
50 INJECTION INTRAMUSCULAR; INTRAVENOUS ONCE
Status: CANCELLED
Start: 2019-09-09

## 2019-09-08 RX ORDER — EPINEPHRINE 1 MG/ML
0.3 INJECTION, SOLUTION, CONCENTRATE INTRAVENOUS PRN
Status: CANCELLED
Start: 2019-09-09

## 2019-09-08 RX ADMIN — AMIKACIN SULFATE 1400 MG: 250 INJECTION, SOLUTION INTRAMUSCULAR; INTRAVENOUS at 07:17

## 2019-09-08 RX ADMIN — Medication 10 ML: at 07:14

## 2019-09-08 RX ADMIN — Medication 10 ML: at 08:27

## 2019-09-08 RX ADMIN — Medication 10 ML: at 08:26

## 2019-09-08 RX ADMIN — Medication 300 UNITS: at 08:27

## 2019-09-08 RX ADMIN — Medication 10 ML: at 07:16

## 2019-09-08 ASSESSMENT — PAIN SCALES - GENERAL: PAINLEVEL_OUTOF10: 0

## 2019-09-09 ENCOUNTER — HOSPITAL ENCOUNTER (OUTPATIENT)
Dept: INFUSION THERAPY | Age: 69
Setting detail: INFUSION SERIES
Discharge: HOME | End: 2019-09-09
Payer: MEDICARE

## 2019-09-09 VITALS
HEART RATE: 68 BPM | SYSTOLIC BLOOD PRESSURE: 117 MMHG | TEMPERATURE: 97.6 F | OXYGEN SATURATION: 94 % | DIASTOLIC BLOOD PRESSURE: 68 MMHG | BODY MASS INDEX: 27.17 KG/M2 | RESPIRATION RATE: 18 BRPM | WEIGHT: 205 LBS | HEIGHT: 73 IN

## 2019-09-09 DIAGNOSIS — A31.2 DISSEMINATED MYCOBACTERIUM AVIUM-INTRACELLULARE COMPLEX (HCC): Primary | ICD-10-CM

## 2019-09-09 LAB
AMIKACIN DOSE: ABNORMAL
AMIKACIN TROUGH: <0.8 MCG/ML (ref 4–8)

## 2019-09-09 PROCEDURE — 2580000003 HC RX 258: Performed by: SPECIALIST

## 2019-09-09 PROCEDURE — 6360000002 HC RX W HCPCS: Performed by: SPECIALIST

## 2019-09-09 PROCEDURE — 96365 THER/PROPH/DIAG IV INF INIT: CPT

## 2019-09-09 RX ORDER — SODIUM CHLORIDE 0.9 % (FLUSH) 0.9 %
10 SYRINGE (ML) INJECTION PRN
Status: DISCONTINUED | OUTPATIENT
Start: 2019-09-09 | End: 2019-09-10 | Stop reason: HOSPADM

## 2019-09-09 RX ORDER — SODIUM CHLORIDE 0.9 % (FLUSH) 0.9 %
10 SYRINGE (ML) INJECTION PRN
Status: CANCELLED
Start: 2019-09-10

## 2019-09-09 RX ORDER — DIPHENHYDRAMINE HCL 25 MG
50 TABLET ORAL ONCE
Status: CANCELLED
Start: 2019-09-10

## 2019-09-09 RX ORDER — DIPHENHYDRAMINE HYDROCHLORIDE 50 MG/ML
50 INJECTION INTRAMUSCULAR; INTRAVENOUS ONCE
Status: CANCELLED
Start: 2019-09-10

## 2019-09-09 RX ORDER — DIPHENHYDRAMINE HCL 25 MG
25 TABLET ORAL PRN
Status: CANCELLED
Start: 2019-09-10

## 2019-09-09 RX ORDER — EPINEPHRINE 1 MG/ML
0.3 INJECTION, SOLUTION, CONCENTRATE INTRAVENOUS PRN
Status: CANCELLED
Start: 2019-09-10

## 2019-09-09 RX ORDER — HEPARIN SODIUM (PORCINE) LOCK FLUSH IV SOLN 100 UNIT/ML 100 UNIT/ML
300 SOLUTION INTRAVENOUS PRN
Status: DISCONTINUED | OUTPATIENT
Start: 2019-09-09 | End: 2019-09-10 | Stop reason: HOSPADM

## 2019-09-09 RX ADMIN — Medication 10 ML: at 08:19

## 2019-09-09 RX ADMIN — Medication 300 UNITS: at 08:20

## 2019-09-09 RX ADMIN — AMIKACIN SULFATE 1400 MG: 250 INJECTION, SOLUTION INTRAMUSCULAR; INTRAVENOUS at 07:11

## 2019-09-09 RX ADMIN — Medication 10 ML: at 07:09

## 2019-09-09 RX ADMIN — Medication 10 ML: at 08:20

## 2019-09-09 ASSESSMENT — PAIN SCALES - GENERAL: PAINLEVEL_OUTOF10: 0

## 2019-09-09 NOTE — PROGRESS NOTES
Tolerated infusion well. Therapy plan reviewed with patient. Verbalizes understanding. Reviewed AVS with patient, reviewed medication information, verbalizes good knowledge of current plan, and has no signs or symptoms to report at this time. Declines copy of AVS. Patient instructed on s/s infection/complication with midline to report and instructed on keepingpicc linedressing clean, dry and intact patient verbalizes understanding. Next appointment scheduled.

## 2019-09-09 NOTE — PROGRESS NOTES
Labs faxed to [facility] and also called so they can look at lab levels(amikacin level low)

## 2019-09-10 ENCOUNTER — HOSPITAL ENCOUNTER (OUTPATIENT)
Dept: INFUSION THERAPY | Age: 69
Setting detail: INFUSION SERIES
Discharge: HOME | End: 2019-09-10
Payer: MEDICARE

## 2019-09-10 VITALS
RESPIRATION RATE: 16 BRPM | WEIGHT: 205 LBS | BODY MASS INDEX: 27.05 KG/M2 | DIASTOLIC BLOOD PRESSURE: 63 MMHG | SYSTOLIC BLOOD PRESSURE: 102 MMHG | HEART RATE: 92 BPM | OXYGEN SATURATION: 92 % | TEMPERATURE: 97.8 F

## 2019-09-10 DIAGNOSIS — A31.2 DISSEMINATED MYCOBACTERIUM AVIUM-INTRACELLULARE COMPLEX (HCC): Primary | ICD-10-CM

## 2019-09-10 PROBLEM — A31.0 PULMONARY MYCOBACTERIUM AVIUM COMPLEX (MAC) INFECTION (HCC): Status: ACTIVE | Noted: 2019-08-30

## 2019-09-10 PROCEDURE — 6360000002 HC RX W HCPCS: Performed by: SPECIALIST

## 2019-09-10 PROCEDURE — 2580000003 HC RX 258: Performed by: SPECIALIST

## 2019-09-10 PROCEDURE — 96365 THER/PROPH/DIAG IV INF INIT: CPT

## 2019-09-10 RX ORDER — EPINEPHRINE 1 MG/ML
0.3 INJECTION, SOLUTION, CONCENTRATE INTRAVENOUS PRN
Status: CANCELLED
Start: 2019-09-11

## 2019-09-10 RX ORDER — SODIUM CHLORIDE 0.9 % (FLUSH) 0.9 %
10 SYRINGE (ML) INJECTION PRN
Status: CANCELLED
Start: 2019-09-11

## 2019-09-10 RX ORDER — SODIUM CHLORIDE 0.9 % (FLUSH) 0.9 %
10 SYRINGE (ML) INJECTION PRN
Status: DISCONTINUED | OUTPATIENT
Start: 2019-09-10 | End: 2019-09-11 | Stop reason: HOSPADM

## 2019-09-10 RX ORDER — DIPHENHYDRAMINE HCL 25 MG
25 TABLET ORAL PRN
Status: CANCELLED
Start: 2019-09-11

## 2019-09-10 RX ORDER — DIPHENHYDRAMINE HCL 25 MG
50 TABLET ORAL ONCE
Status: CANCELLED
Start: 2019-09-11

## 2019-09-10 RX ORDER — DIPHENHYDRAMINE HYDROCHLORIDE 50 MG/ML
50 INJECTION INTRAMUSCULAR; INTRAVENOUS ONCE
Status: CANCELLED
Start: 2019-09-11

## 2019-09-10 RX ADMIN — Medication 10 ML: at 07:10

## 2019-09-10 RX ADMIN — Medication 10 ML: at 08:23

## 2019-09-10 RX ADMIN — Medication 10 ML: at 08:22

## 2019-09-10 RX ADMIN — AMIKACIN SULFATE 1400 MG: 250 INJECTION, SOLUTION INTRAMUSCULAR; INTRAVENOUS at 07:14

## 2019-09-10 RX ADMIN — Medication 300 UNITS: at 08:24

## 2019-09-10 ASSESSMENT — PAIN DESCRIPTION - ONSET: ONSET: ON-GOING

## 2019-09-10 ASSESSMENT — PAIN DESCRIPTION - PAIN TYPE: TYPE: CHRONIC PAIN

## 2019-09-10 ASSESSMENT — PAIN DESCRIPTION - DESCRIPTORS: DESCRIPTORS: ACHING

## 2019-09-10 ASSESSMENT — PAIN DESCRIPTION - FREQUENCY: FREQUENCY: INTERMITTENT

## 2019-09-10 ASSESSMENT — PAIN DESCRIPTION - PROGRESSION: CLINICAL_PROGRESSION: NOT CHANGED

## 2019-09-10 ASSESSMENT — PAIN DESCRIPTION - LOCATION: LOCATION: HIP;KNEE

## 2019-09-11 ENCOUNTER — HOSPITAL ENCOUNTER (OUTPATIENT)
Age: 69
Discharge: HOME | End: 2019-09-13
Payer: MEDICARE

## 2019-09-11 ENCOUNTER — HOSPITAL ENCOUNTER (OUTPATIENT)
Dept: INFUSION THERAPY | Age: 69
Setting detail: INFUSION SERIES
Discharge: HOME | End: 2019-09-11
Payer: MEDICARE

## 2019-09-11 ENCOUNTER — HOSPITAL ENCOUNTER (OUTPATIENT)
Dept: GENERAL RADIOLOGY | Age: 69
Discharge: HOME | End: 2019-09-13
Payer: MEDICARE

## 2019-09-11 VITALS
TEMPERATURE: 98.2 F | HEART RATE: 85 BPM | HEIGHT: 73 IN | DIASTOLIC BLOOD PRESSURE: 85 MMHG | SYSTOLIC BLOOD PRESSURE: 109 MMHG | RESPIRATION RATE: 18 BRPM | OXYGEN SATURATION: 92 % | BODY MASS INDEX: 27.17 KG/M2 | WEIGHT: 205 LBS

## 2019-09-11 DIAGNOSIS — J18.9 PNEUMONIA OF RIGHT LOWER LOBE DUE TO INFECTIOUS ORGANISM: ICD-10-CM

## 2019-09-11 DIAGNOSIS — A31.0 PULMONARY MYCOBACTERIUM AVIUM COMPLEX (MAC) INFECTION (HCC): Primary | ICD-10-CM

## 2019-09-11 PROCEDURE — 2580000003 HC RX 258: Performed by: SPECIALIST

## 2019-09-11 PROCEDURE — 71046 X-RAY EXAM CHEST 2 VIEWS: CPT

## 2019-09-11 PROCEDURE — 96365 THER/PROPH/DIAG IV INF INIT: CPT

## 2019-09-11 PROCEDURE — 6360000002 HC RX W HCPCS: Performed by: SPECIALIST

## 2019-09-11 RX ORDER — EPINEPHRINE 1 MG/ML
0.3 INJECTION, SOLUTION, CONCENTRATE INTRAVENOUS PRN
Status: CANCELLED
Start: 2019-09-12

## 2019-09-11 RX ORDER — SODIUM CHLORIDE 0.9 % (FLUSH) 0.9 %
10 SYRINGE (ML) INJECTION PRN
Status: DISCONTINUED | OUTPATIENT
Start: 2019-09-11 | End: 2019-09-12 | Stop reason: HOSPADM

## 2019-09-11 RX ORDER — DIPHENHYDRAMINE HCL 25 MG
50 TABLET ORAL ONCE
Status: CANCELLED
Start: 2019-09-12

## 2019-09-11 RX ORDER — SODIUM CHLORIDE 0.9 % (FLUSH) 0.9 %
10 SYRINGE (ML) INJECTION PRN
Status: CANCELLED
Start: 2019-09-12

## 2019-09-11 RX ORDER — DIPHENHYDRAMINE HCL 25 MG
25 TABLET ORAL PRN
Status: CANCELLED
Start: 2019-09-12

## 2019-09-11 RX ORDER — DIPHENHYDRAMINE HYDROCHLORIDE 50 MG/ML
50 INJECTION INTRAMUSCULAR; INTRAVENOUS ONCE
Status: CANCELLED
Start: 2019-09-12

## 2019-09-11 RX ADMIN — Medication 300 UNITS: at 08:36

## 2019-09-11 RX ADMIN — Medication 10 ML: at 07:31

## 2019-09-11 RX ADMIN — Medication 10 ML: at 08:36

## 2019-09-11 RX ADMIN — Medication 10 ML: at 08:35

## 2019-09-11 RX ADMIN — AMIKACIN SULFATE 1400 MG: 250 INJECTION, SOLUTION INTRAMUSCULAR; INTRAVENOUS at 07:31

## 2019-09-12 ENCOUNTER — HOSPITAL ENCOUNTER (OUTPATIENT)
Dept: INFUSION THERAPY | Age: 69
Setting detail: INFUSION SERIES
Discharge: HOME | End: 2019-09-12
Payer: MEDICARE

## 2019-09-12 VITALS
DIASTOLIC BLOOD PRESSURE: 72 MMHG | HEIGHT: 73 IN | OXYGEN SATURATION: 92 % | SYSTOLIC BLOOD PRESSURE: 117 MMHG | BODY MASS INDEX: 27.17 KG/M2 | HEART RATE: 76 BPM | TEMPERATURE: 97.7 F | RESPIRATION RATE: 18 BRPM | WEIGHT: 205 LBS

## 2019-09-12 DIAGNOSIS — A31.0 PULMONARY MYCOBACTERIUM AVIUM COMPLEX (MAC) INFECTION (HCC): Primary | ICD-10-CM

## 2019-09-12 LAB
ALBUMIN SERPL-MCNC: 4.1 G/DL (ref 3.5–5.2)
ALP BLD-CCNC: 65 U/L (ref 40–129)
ALT SERPL-CCNC: 10 U/L (ref 0–40)
AMIKACIN DOSE: ABNORMAL
AMIKACIN TROUGH: <0.8 MCG/ML (ref 4–8)
ANION GAP SERPL CALCULATED.3IONS-SCNC: 11 MMOL/L (ref 7–16)
AST SERPL-CCNC: 14 U/L (ref 0–39)
BASOPHILS ABSOLUTE: 0.05 E9/L (ref 0–0.2)
BASOPHILS RELATIVE PERCENT: 0.9 % (ref 0–2)
BILIRUB SERPL-MCNC: 0.3 MG/DL (ref 0–1.2)
BUN BLDV-MCNC: 13 MG/DL (ref 8–23)
CALCIUM SERPL-MCNC: 9.6 MG/DL (ref 8.6–10.2)
CHLORIDE BLD-SCNC: 106 MMOL/L (ref 98–107)
CO2: 25 MMOL/L (ref 22–29)
CREAT SERPL-MCNC: 0.7 MG/DL (ref 0.7–1.2)
EOSINOPHILS ABSOLUTE: 0.17 E9/L (ref 0.05–0.5)
EOSINOPHILS RELATIVE PERCENT: 3.2 % (ref 0–6)
GFR AFRICAN AMERICAN: >60
GFR NON-AFRICAN AMERICAN: >60 ML/MIN/1.73
GLUCOSE BLD-MCNC: 95 MG/DL (ref 74–99)
HCT VFR BLD CALC: 47 % (ref 37–54)
HEMOGLOBIN: 15.1 G/DL (ref 12.5–16.5)
IMMATURE GRANULOCYTES #: 0.02 E9/L
IMMATURE GRANULOCYTES %: 0.4 % (ref 0–5)
LYMPHOCYTES ABSOLUTE: 1.23 E9/L (ref 1.5–4)
LYMPHOCYTES RELATIVE PERCENT: 23 % (ref 20–42)
MCH RBC QN AUTO: 28.6 PG (ref 26–35)
MCHC RBC AUTO-ENTMCNC: 32.1 % (ref 32–34.5)
MCV RBC AUTO: 89 FL (ref 80–99.9)
MONOCYTES ABSOLUTE: 0.43 E9/L (ref 0.1–0.95)
MONOCYTES RELATIVE PERCENT: 8.1 % (ref 2–12)
NEUTROPHILS ABSOLUTE: 3.44 E9/L (ref 1.8–7.3)
NEUTROPHILS RELATIVE PERCENT: 64.4 % (ref 43–80)
PDW BLD-RTO: 13.8 FL (ref 11.5–15)
PLATELET # BLD: 213 E9/L (ref 130–450)
PMV BLD AUTO: 10 FL (ref 7–12)
POTASSIUM SERPL-SCNC: 4.4 MMOL/L (ref 3.5–5)
RBC # BLD: 5.28 E12/L (ref 3.8–5.8)
SODIUM BLD-SCNC: 142 MMOL/L (ref 132–146)
TOTAL PROTEIN: 6.7 G/DL (ref 6.4–8.3)
WBC # BLD: 5.3 E9/L (ref 4.5–11.5)

## 2019-09-12 PROCEDURE — 2580000003 HC RX 258: Performed by: SPECIALIST

## 2019-09-12 PROCEDURE — 96365 THER/PROPH/DIAG IV INF INIT: CPT

## 2019-09-12 PROCEDURE — 80053 COMPREHEN METABOLIC PANEL: CPT

## 2019-09-12 PROCEDURE — 80150 ASSAY OF AMIKACIN: CPT

## 2019-09-12 PROCEDURE — 85025 COMPLETE CBC W/AUTO DIFF WBC: CPT

## 2019-09-12 PROCEDURE — 6360000002 HC RX W HCPCS: Performed by: SPECIALIST

## 2019-09-12 PROCEDURE — 36415 COLL VENOUS BLD VENIPUNCTURE: CPT

## 2019-09-12 RX ORDER — EPINEPHRINE 1 MG/ML
0.3 INJECTION, SOLUTION, CONCENTRATE INTRAVENOUS PRN
Status: CANCELLED
Start: 2019-09-13

## 2019-09-12 RX ORDER — DIPHENHYDRAMINE HCL 25 MG
50 TABLET ORAL ONCE
Status: CANCELLED
Start: 2019-09-13

## 2019-09-12 RX ORDER — SODIUM CHLORIDE 0.9 % (FLUSH) 0.9 %
10 SYRINGE (ML) INJECTION PRN
Status: CANCELLED
Start: 2019-09-13

## 2019-09-12 RX ORDER — SODIUM CHLORIDE 0.9 % (FLUSH) 0.9 %
10 SYRINGE (ML) INJECTION PRN
Status: DISCONTINUED | OUTPATIENT
Start: 2019-09-12 | End: 2019-09-13 | Stop reason: HOSPADM

## 2019-09-12 RX ORDER — DIPHENHYDRAMINE HYDROCHLORIDE 50 MG/ML
50 INJECTION INTRAMUSCULAR; INTRAVENOUS ONCE
Status: CANCELLED
Start: 2019-09-13

## 2019-09-12 RX ORDER — DIPHENHYDRAMINE HCL 25 MG
25 TABLET ORAL PRN
Status: CANCELLED
Start: 2019-09-13

## 2019-09-12 RX ADMIN — Medication 300 UNITS: at 08:43

## 2019-09-12 RX ADMIN — AMIKACIN SULFATE 1400 MG: 250 INJECTION, SOLUTION INTRAMUSCULAR; INTRAVENOUS at 07:32

## 2019-09-12 RX ADMIN — Medication 10 ML: at 08:42

## 2019-09-12 RX ADMIN — Medication 10 ML: at 07:30

## 2019-09-12 RX ADMIN — Medication 10 ML: at 08:43

## 2019-09-12 RX ADMIN — Medication 10 ML: at 07:31

## 2019-09-12 ASSESSMENT — PAIN DESCRIPTION - PROGRESSION: CLINICAL_PROGRESSION: NOT CHANGED

## 2019-09-12 ASSESSMENT — PAIN SCALES - GENERAL: PAINLEVEL_OUTOF10: 0

## 2019-09-12 ASSESSMENT — PAIN DESCRIPTION - FREQUENCY: FREQUENCY: INTERMITTENT

## 2019-09-12 ASSESSMENT — PAIN DESCRIPTION - PAIN TYPE: TYPE: CHRONIC PAIN

## 2019-09-12 ASSESSMENT — PAIN DESCRIPTION - ONSET: ONSET: ON-GOING

## 2019-09-13 ENCOUNTER — HOSPITAL ENCOUNTER (OUTPATIENT)
Dept: INFUSION THERAPY | Age: 69
Setting detail: INFUSION SERIES
Discharge: HOME | End: 2019-09-13
Payer: MEDICARE

## 2019-09-13 VITALS
OXYGEN SATURATION: 93 % | SYSTOLIC BLOOD PRESSURE: 116 MMHG | RESPIRATION RATE: 18 BRPM | TEMPERATURE: 97.9 F | WEIGHT: 205 LBS | DIASTOLIC BLOOD PRESSURE: 70 MMHG | BODY MASS INDEX: 27.05 KG/M2 | HEART RATE: 86 BPM

## 2019-09-13 DIAGNOSIS — A31.0 PULMONARY MYCOBACTERIUM AVIUM COMPLEX (MAC) INFECTION (HCC): Primary | ICD-10-CM

## 2019-09-13 PROCEDURE — 96365 THER/PROPH/DIAG IV INF INIT: CPT

## 2019-09-13 PROCEDURE — 6360000002 HC RX W HCPCS: Performed by: SPECIALIST

## 2019-09-13 PROCEDURE — 2580000003 HC RX 258: Performed by: SPECIALIST

## 2019-09-13 RX ORDER — EPINEPHRINE 1 MG/ML
0.3 INJECTION, SOLUTION, CONCENTRATE INTRAVENOUS PRN
Status: CANCELLED
Start: 2019-09-14

## 2019-09-13 RX ORDER — SODIUM CHLORIDE 0.9 % (FLUSH) 0.9 %
10 SYRINGE (ML) INJECTION PRN
Status: CANCELLED
Start: 2019-09-14

## 2019-09-13 RX ORDER — DIPHENHYDRAMINE HCL 25 MG
50 TABLET ORAL ONCE
Status: CANCELLED
Start: 2019-09-14

## 2019-09-13 RX ORDER — DIPHENHYDRAMINE HYDROCHLORIDE 50 MG/ML
50 INJECTION INTRAMUSCULAR; INTRAVENOUS ONCE
Status: CANCELLED
Start: 2019-09-14

## 2019-09-13 RX ORDER — SODIUM CHLORIDE 0.9 % (FLUSH) 0.9 %
10 SYRINGE (ML) INJECTION PRN
Status: DISCONTINUED | OUTPATIENT
Start: 2019-09-13 | End: 2019-09-14 | Stop reason: HOSPADM

## 2019-09-13 RX ORDER — DIPHENHYDRAMINE HCL 25 MG
25 TABLET ORAL PRN
Status: CANCELLED
Start: 2019-09-14

## 2019-09-13 RX ADMIN — Medication 10 ML: at 07:07

## 2019-09-13 RX ADMIN — Medication 10 ML: at 08:15

## 2019-09-13 RX ADMIN — AMIKACIN SULFATE 1400 MG: 250 INJECTION, SOLUTION INTRAMUSCULAR; INTRAVENOUS at 07:07

## 2019-09-13 RX ADMIN — Medication 300 UNITS: at 08:15

## 2019-09-13 RX ADMIN — Medication 10 ML: at 08:14

## 2019-09-14 ENCOUNTER — HOSPITAL ENCOUNTER (OUTPATIENT)
Dept: INFUSION THERAPY | Age: 69
Setting detail: INFUSION SERIES
Discharge: HOME | End: 2019-09-14
Payer: MEDICARE

## 2019-09-14 VITALS
SYSTOLIC BLOOD PRESSURE: 100 MMHG | OXYGEN SATURATION: 93 % | RESPIRATION RATE: 18 BRPM | HEART RATE: 80 BPM | TEMPERATURE: 98 F | DIASTOLIC BLOOD PRESSURE: 60 MMHG

## 2019-09-14 DIAGNOSIS — A31.0 PULMONARY MYCOBACTERIUM AVIUM COMPLEX (MAC) INFECTION (HCC): Primary | ICD-10-CM

## 2019-09-14 PROCEDURE — 2580000003 HC RX 258: Performed by: SPECIALIST

## 2019-09-14 PROCEDURE — 96365 THER/PROPH/DIAG IV INF INIT: CPT

## 2019-09-14 PROCEDURE — 6360000002 HC RX W HCPCS: Performed by: SPECIALIST

## 2019-09-14 RX ORDER — EPINEPHRINE 1 MG/ML
0.3 INJECTION, SOLUTION, CONCENTRATE INTRAVENOUS PRN
Status: CANCELLED
Start: 2019-09-15

## 2019-09-14 RX ORDER — DIPHENHYDRAMINE HCL 25 MG
25 TABLET ORAL PRN
Status: CANCELLED
Start: 2019-09-15

## 2019-09-14 RX ORDER — SODIUM CHLORIDE 0.9 % (FLUSH) 0.9 %
10 SYRINGE (ML) INJECTION PRN
Status: CANCELLED
Start: 2019-09-15

## 2019-09-14 RX ORDER — SODIUM CHLORIDE 0.9 % (FLUSH) 0.9 %
10 SYRINGE (ML) INJECTION PRN
Status: DISCONTINUED | OUTPATIENT
Start: 2019-09-14 | End: 2019-09-15 | Stop reason: HOSPADM

## 2019-09-14 RX ORDER — DIPHENHYDRAMINE HYDROCHLORIDE 50 MG/ML
50 INJECTION INTRAMUSCULAR; INTRAVENOUS ONCE
Status: CANCELLED
Start: 2019-09-15

## 2019-09-14 RX ORDER — DIPHENHYDRAMINE HCL 25 MG
50 TABLET ORAL ONCE
Status: CANCELLED
Start: 2019-09-15

## 2019-09-14 RX ADMIN — Medication 10 ML: at 07:31

## 2019-09-14 RX ADMIN — Medication 10 ML: at 08:44

## 2019-09-14 RX ADMIN — Medication 300 UNITS: at 08:44

## 2019-09-14 RX ADMIN — AMIKACIN SULFATE 1400 MG: 250 INJECTION, SOLUTION INTRAMUSCULAR; INTRAVENOUS at 07:40

## 2019-09-14 RX ADMIN — Medication 10 ML: at 08:43

## 2019-09-14 ASSESSMENT — PAIN SCALES - GENERAL: PAINLEVEL_OUTOF10: 0

## 2019-09-15 ENCOUNTER — HOSPITAL ENCOUNTER (OUTPATIENT)
Dept: INFUSION THERAPY | Age: 69
Setting detail: INFUSION SERIES
Discharge: HOME | End: 2019-09-15
Payer: MEDICARE

## 2019-09-15 VITALS
TEMPERATURE: 98.1 F | HEART RATE: 81 BPM | SYSTOLIC BLOOD PRESSURE: 109 MMHG | OXYGEN SATURATION: 92 % | BODY MASS INDEX: 27.17 KG/M2 | RESPIRATION RATE: 16 BRPM | WEIGHT: 205 LBS | DIASTOLIC BLOOD PRESSURE: 70 MMHG | HEIGHT: 73 IN

## 2019-09-15 DIAGNOSIS — A31.0 PULMONARY MYCOBACTERIUM AVIUM COMPLEX (MAC) INFECTION (HCC): Primary | ICD-10-CM

## 2019-09-15 PROCEDURE — 2580000003 HC RX 258: Performed by: SPECIALIST

## 2019-09-15 PROCEDURE — 6360000002 HC RX W HCPCS: Performed by: SPECIALIST

## 2019-09-15 PROCEDURE — 96365 THER/PROPH/DIAG IV INF INIT: CPT

## 2019-09-15 RX ORDER — SODIUM CHLORIDE 0.9 % (FLUSH) 0.9 %
10 SYRINGE (ML) INJECTION PRN
Status: CANCELLED
Start: 2019-09-16

## 2019-09-15 RX ORDER — SODIUM CHLORIDE 0.9 % (FLUSH) 0.9 %
10 SYRINGE (ML) INJECTION PRN
Status: DISCONTINUED | OUTPATIENT
Start: 2019-09-15 | End: 2019-09-16 | Stop reason: HOSPADM

## 2019-09-15 RX ORDER — DIPHENHYDRAMINE HYDROCHLORIDE 50 MG/ML
50 INJECTION INTRAMUSCULAR; INTRAVENOUS ONCE
Status: CANCELLED
Start: 2019-09-16

## 2019-09-15 RX ORDER — DIPHENHYDRAMINE HCL 25 MG
50 TABLET ORAL ONCE
Status: CANCELLED
Start: 2019-09-16

## 2019-09-15 RX ORDER — EPINEPHRINE 1 MG/ML
0.3 INJECTION, SOLUTION, CONCENTRATE INTRAVENOUS PRN
Status: CANCELLED
Start: 2019-09-16

## 2019-09-15 RX ORDER — DIPHENHYDRAMINE HCL 25 MG
25 TABLET ORAL PRN
Status: CANCELLED
Start: 2019-09-16

## 2019-09-15 RX ADMIN — Medication 10 ML: at 09:09

## 2019-09-15 RX ADMIN — Medication 300 UNITS: at 09:09

## 2019-09-15 RX ADMIN — Medication 10 ML: at 09:08

## 2019-09-15 RX ADMIN — Medication 10 ML: at 07:24

## 2019-09-15 RX ADMIN — AMIKACIN SULFATE 1400 MG: 250 INJECTION, SOLUTION INTRAMUSCULAR; INTRAVENOUS at 08:03

## 2019-09-15 ASSESSMENT — PAIN DESCRIPTION - PROGRESSION: CLINICAL_PROGRESSION: NOT CHANGED

## 2019-09-15 ASSESSMENT — PAIN DESCRIPTION - PAIN TYPE: TYPE: CHRONIC PAIN

## 2019-09-15 ASSESSMENT — PAIN DESCRIPTION - LOCATION: LOCATION: HIP;KNEE

## 2019-09-15 ASSESSMENT — PAIN DESCRIPTION - ORIENTATION: ORIENTATION: RIGHT;LEFT

## 2019-09-15 ASSESSMENT — PAIN DESCRIPTION - FREQUENCY: FREQUENCY: INTERMITTENT

## 2019-09-15 ASSESSMENT — PAIN DESCRIPTION - DESCRIPTORS: DESCRIPTORS: ACHING

## 2019-09-15 ASSESSMENT — PAIN SCALES - GENERAL: PAINLEVEL_OUTOF10: 0

## 2019-09-15 ASSESSMENT — PAIN DESCRIPTION - ONSET: ONSET: ON-GOING

## 2019-09-16 ENCOUNTER — HOSPITAL ENCOUNTER (OUTPATIENT)
Dept: INFUSION THERAPY | Age: 69
Setting detail: INFUSION SERIES
Discharge: HOME | End: 2019-09-16
Payer: MEDICARE

## 2019-09-16 VITALS
DIASTOLIC BLOOD PRESSURE: 67 MMHG | RESPIRATION RATE: 16 BRPM | WEIGHT: 205 LBS | TEMPERATURE: 97.7 F | SYSTOLIC BLOOD PRESSURE: 109 MMHG | HEIGHT: 73 IN | BODY MASS INDEX: 27.17 KG/M2 | OXYGEN SATURATION: 94 % | HEART RATE: 77 BPM

## 2019-09-16 DIAGNOSIS — A31.0 PULMONARY MYCOBACTERIUM AVIUM COMPLEX (MAC) INFECTION (HCC): Primary | ICD-10-CM

## 2019-09-16 LAB
ALBUMIN SERPL-MCNC: 4.3 G/DL (ref 3.5–5.2)
ALP BLD-CCNC: 63 U/L (ref 40–129)
ALT SERPL-CCNC: 8 U/L (ref 0–40)
AMIKACIN DOSE: ABNORMAL
AMIKACIN TROUGH: 0.9 MCG/ML (ref 4–8)
ANION GAP SERPL CALCULATED.3IONS-SCNC: 12 MMOL/L (ref 7–16)
AST SERPL-CCNC: 13 U/L (ref 0–39)
BASOPHILS ABSOLUTE: 0.03 E9/L (ref 0–0.2)
BASOPHILS RELATIVE PERCENT: 0.6 % (ref 0–2)
BILIRUB SERPL-MCNC: 0.3 MG/DL (ref 0–1.2)
BUN BLDV-MCNC: 11 MG/DL (ref 8–23)
C-REACTIVE PROTEIN: 0.9 MG/DL (ref 0–0.4)
CALCIUM SERPL-MCNC: 9.8 MG/DL (ref 8.6–10.2)
CHLORIDE BLD-SCNC: 105 MMOL/L (ref 98–107)
CO2: 25 MMOL/L (ref 22–29)
CREAT SERPL-MCNC: 0.7 MG/DL (ref 0.7–1.2)
EOSINOPHILS ABSOLUTE: 0.22 E9/L (ref 0.05–0.5)
EOSINOPHILS RELATIVE PERCENT: 4.7 % (ref 0–6)
GFR AFRICAN AMERICAN: >60
GFR NON-AFRICAN AMERICAN: >60 ML/MIN/1.73
GLUCOSE BLD-MCNC: 100 MG/DL (ref 74–99)
HCT VFR BLD CALC: 48.3 % (ref 37–54)
HEMOGLOBIN: 15.6 G/DL (ref 12.5–16.5)
IMMATURE GRANULOCYTES #: 0.02 E9/L
IMMATURE GRANULOCYTES %: 0.4 % (ref 0–5)
LYMPHOCYTES ABSOLUTE: 1.3 E9/L (ref 1.5–4)
LYMPHOCYTES RELATIVE PERCENT: 27.8 % (ref 20–42)
MCH RBC QN AUTO: 28.6 PG (ref 26–35)
MCHC RBC AUTO-ENTMCNC: 32.3 % (ref 32–34.5)
MCV RBC AUTO: 88.5 FL (ref 80–99.9)
MONOCYTES ABSOLUTE: 0.35 E9/L (ref 0.1–0.95)
MONOCYTES RELATIVE PERCENT: 7.5 % (ref 2–12)
NEUTROPHILS ABSOLUTE: 2.75 E9/L (ref 1.8–7.3)
NEUTROPHILS RELATIVE PERCENT: 59 % (ref 43–80)
PDW BLD-RTO: 14.1 FL (ref 11.5–15)
PLATELET # BLD: 202 E9/L (ref 130–450)
PMV BLD AUTO: 10 FL (ref 7–12)
POTASSIUM SERPL-SCNC: 4 MMOL/L (ref 3.5–5)
RBC # BLD: 5.46 E12/L (ref 3.8–5.8)
SEDIMENTATION RATE, ERYTHROCYTE: 1 MM/HR (ref 0–15)
SODIUM BLD-SCNC: 142 MMOL/L (ref 132–146)
TOTAL PROTEIN: 6.9 G/DL (ref 6.4–8.3)
WBC # BLD: 4.7 E9/L (ref 4.5–11.5)

## 2019-09-16 PROCEDURE — 96365 THER/PROPH/DIAG IV INF INIT: CPT

## 2019-09-16 PROCEDURE — 6360000002 HC RX W HCPCS: Performed by: SPECIALIST

## 2019-09-16 PROCEDURE — 85025 COMPLETE CBC W/AUTO DIFF WBC: CPT

## 2019-09-16 PROCEDURE — 80150 ASSAY OF AMIKACIN: CPT

## 2019-09-16 PROCEDURE — 80053 COMPREHEN METABOLIC PANEL: CPT

## 2019-09-16 PROCEDURE — 85651 RBC SED RATE NONAUTOMATED: CPT

## 2019-09-16 PROCEDURE — 86140 C-REACTIVE PROTEIN: CPT

## 2019-09-16 PROCEDURE — 2580000003 HC RX 258: Performed by: SPECIALIST

## 2019-09-16 PROCEDURE — 36415 COLL VENOUS BLD VENIPUNCTURE: CPT

## 2019-09-16 RX ORDER — EPINEPHRINE 1 MG/ML
0.3 INJECTION, SOLUTION, CONCENTRATE INTRAVENOUS PRN
Status: CANCELLED
Start: 2019-09-17

## 2019-09-16 RX ORDER — SODIUM CHLORIDE 0.9 % (FLUSH) 0.9 %
10 SYRINGE (ML) INJECTION PRN
Status: CANCELLED
Start: 2019-09-17

## 2019-09-16 RX ORDER — DIPHENHYDRAMINE HCL 25 MG
50 TABLET ORAL ONCE
Status: CANCELLED
Start: 2019-09-17

## 2019-09-16 RX ORDER — DIPHENHYDRAMINE HYDROCHLORIDE 50 MG/ML
50 INJECTION INTRAMUSCULAR; INTRAVENOUS ONCE
Status: CANCELLED
Start: 2019-09-17

## 2019-09-16 RX ORDER — SODIUM CHLORIDE 0.9 % (FLUSH) 0.9 %
10 SYRINGE (ML) INJECTION PRN
Status: DISCONTINUED | OUTPATIENT
Start: 2019-09-16 | End: 2019-09-17 | Stop reason: HOSPADM

## 2019-09-16 RX ORDER — DIPHENHYDRAMINE HCL 25 MG
25 TABLET ORAL PRN
Status: CANCELLED
Start: 2019-09-17

## 2019-09-16 RX ADMIN — Medication 10 ML: at 08:45

## 2019-09-16 RX ADMIN — AMIKACIN SULFATE 1400 MG: 250 INJECTION, SOLUTION INTRAMUSCULAR; INTRAVENOUS at 07:36

## 2019-09-16 RX ADMIN — Medication 10 ML: at 07:33

## 2019-09-16 RX ADMIN — Medication 300 UNITS: at 08:46

## 2019-09-16 RX ADMIN — Medication 10 ML: at 07:34

## 2019-09-16 RX ADMIN — Medication 10 ML: at 08:46

## 2019-09-16 ASSESSMENT — PAIN DESCRIPTION - ORIENTATION: ORIENTATION: RIGHT;LEFT

## 2019-09-16 ASSESSMENT — PAIN DESCRIPTION - LOCATION: LOCATION: HIP

## 2019-09-16 ASSESSMENT — PAIN DESCRIPTION - FREQUENCY: FREQUENCY: INTERMITTENT

## 2019-09-16 ASSESSMENT — PAIN DESCRIPTION - ONSET: ONSET: ON-GOING

## 2019-09-16 ASSESSMENT — PAIN DESCRIPTION - PROGRESSION: CLINICAL_PROGRESSION: NOT CHANGED

## 2019-09-16 ASSESSMENT — PAIN DESCRIPTION - PAIN TYPE: TYPE: CHRONIC PAIN

## 2019-09-16 ASSESSMENT — PAIN DESCRIPTION - DESCRIPTORS: DESCRIPTORS: ACHING

## 2019-09-16 ASSESSMENT — PAIN SCALES - GENERAL: PAINLEVEL_OUTOF10: 3

## 2019-09-17 ENCOUNTER — HOSPITAL ENCOUNTER (OUTPATIENT)
Dept: INFUSION THERAPY | Age: 69
Setting detail: INFUSION SERIES
Discharge: HOME | End: 2019-09-17
Payer: MEDICARE

## 2019-09-17 VITALS
HEIGHT: 73 IN | WEIGHT: 205 LBS | RESPIRATION RATE: 16 BRPM | BODY MASS INDEX: 27.17 KG/M2 | DIASTOLIC BLOOD PRESSURE: 67 MMHG | OXYGEN SATURATION: 93 % | TEMPERATURE: 97.7 F | SYSTOLIC BLOOD PRESSURE: 96 MMHG | HEART RATE: 73 BPM

## 2019-09-17 DIAGNOSIS — A31.0 PULMONARY MYCOBACTERIUM AVIUM COMPLEX (MAC) INFECTION (HCC): Primary | ICD-10-CM

## 2019-09-17 PROCEDURE — 6360000002 HC RX W HCPCS: Performed by: SPECIALIST

## 2019-09-17 PROCEDURE — 2580000003 HC RX 258: Performed by: SPECIALIST

## 2019-09-17 PROCEDURE — 96365 THER/PROPH/DIAG IV INF INIT: CPT

## 2019-09-17 RX ORDER — DIPHENHYDRAMINE HCL 25 MG
50 TABLET ORAL ONCE
Status: CANCELLED
Start: 2019-09-18

## 2019-09-17 RX ORDER — DIPHENHYDRAMINE HYDROCHLORIDE 50 MG/ML
50 INJECTION INTRAMUSCULAR; INTRAVENOUS ONCE
Status: CANCELLED
Start: 2019-09-18

## 2019-09-17 RX ORDER — SODIUM CHLORIDE 0.9 % (FLUSH) 0.9 %
10 SYRINGE (ML) INJECTION PRN
Status: CANCELLED
Start: 2019-09-18

## 2019-09-17 RX ORDER — DIPHENHYDRAMINE HCL 25 MG
25 TABLET ORAL PRN
Status: CANCELLED
Start: 2019-09-18

## 2019-09-17 RX ORDER — SODIUM CHLORIDE 0.9 % (FLUSH) 0.9 %
10 SYRINGE (ML) INJECTION PRN
Status: DISCONTINUED | OUTPATIENT
Start: 2019-09-17 | End: 2019-09-18 | Stop reason: HOSPADM

## 2019-09-17 RX ORDER — EPINEPHRINE 1 MG/ML
0.3 INJECTION, SOLUTION, CONCENTRATE INTRAVENOUS PRN
Status: CANCELLED
Start: 2019-09-18

## 2019-09-17 RX ADMIN — Medication 10 ML: at 07:30

## 2019-09-17 RX ADMIN — AMIKACIN SULFATE 1400 MG: 250 INJECTION, SOLUTION INTRAMUSCULAR; INTRAVENOUS at 07:32

## 2019-09-17 RX ADMIN — Medication 10 ML: at 08:43

## 2019-09-17 RX ADMIN — Medication 300 UNITS: at 08:43

## 2019-09-17 RX ADMIN — Medication 10 ML: at 08:42

## 2019-09-17 ASSESSMENT — PAIN SCALES - GENERAL: PAINLEVEL_OUTOF10: 0

## 2019-09-18 ENCOUNTER — HOSPITAL ENCOUNTER (OUTPATIENT)
Dept: INFUSION THERAPY | Age: 69
Setting detail: INFUSION SERIES
Discharge: HOME | End: 2019-09-18
Payer: MEDICARE

## 2019-09-18 VITALS
WEIGHT: 205 LBS | HEART RATE: 75 BPM | TEMPERATURE: 97.8 F | SYSTOLIC BLOOD PRESSURE: 102 MMHG | OXYGEN SATURATION: 94 % | BODY MASS INDEX: 27.05 KG/M2 | DIASTOLIC BLOOD PRESSURE: 62 MMHG | RESPIRATION RATE: 16 BRPM

## 2019-09-18 DIAGNOSIS — A31.0 PULMONARY MYCOBACTERIUM AVIUM COMPLEX (MAC) INFECTION (HCC): Primary | ICD-10-CM

## 2019-09-18 PROCEDURE — 6360000002 HC RX W HCPCS: Performed by: SPECIALIST

## 2019-09-18 PROCEDURE — 96366 THER/PROPH/DIAG IV INF ADDON: CPT

## 2019-09-18 PROCEDURE — 96365 THER/PROPH/DIAG IV INF INIT: CPT

## 2019-09-18 PROCEDURE — 2580000003 HC RX 258: Performed by: SPECIALIST

## 2019-09-18 RX ORDER — DIPHENHYDRAMINE HCL 25 MG
25 TABLET ORAL PRN
Status: CANCELLED
Start: 2019-09-19

## 2019-09-18 RX ORDER — DIPHENHYDRAMINE HCL 25 MG
50 TABLET ORAL ONCE
Status: CANCELLED
Start: 2019-09-19

## 2019-09-18 RX ORDER — DIPHENHYDRAMINE HYDROCHLORIDE 50 MG/ML
50 INJECTION INTRAMUSCULAR; INTRAVENOUS ONCE
Status: CANCELLED
Start: 2019-09-19

## 2019-09-18 RX ORDER — SODIUM CHLORIDE 0.9 % (FLUSH) 0.9 %
10 SYRINGE (ML) INJECTION PRN
Status: CANCELLED
Start: 2019-09-19

## 2019-09-18 RX ORDER — EPINEPHRINE 1 MG/ML
0.3 INJECTION, SOLUTION, CONCENTRATE INTRAVENOUS PRN
Status: CANCELLED
Start: 2019-09-19

## 2019-09-18 RX ORDER — SODIUM CHLORIDE 0.9 % (FLUSH) 0.9 %
10 SYRINGE (ML) INJECTION PRN
Status: DISCONTINUED | OUTPATIENT
Start: 2019-09-18 | End: 2019-09-19 | Stop reason: HOSPADM

## 2019-09-18 RX ADMIN — AMIKACIN SULFATE 1400 MG: 250 INJECTION, SOLUTION INTRAMUSCULAR; INTRAVENOUS at 07:35

## 2019-09-18 RX ADMIN — Medication 10 ML: at 07:29

## 2019-09-18 RX ADMIN — Medication 300 UNITS: at 08:37

## 2019-09-18 RX ADMIN — Medication 10 ML: at 08:36

## 2019-09-18 RX ADMIN — Medication 10 ML: at 08:37

## 2019-09-19 ENCOUNTER — HOSPITAL ENCOUNTER (OUTPATIENT)
Dept: INFUSION THERAPY | Age: 69
Setting detail: INFUSION SERIES
Discharge: HOME | End: 2019-09-19
Payer: MEDICARE

## 2019-09-19 VITALS
DIASTOLIC BLOOD PRESSURE: 63 MMHG | BODY MASS INDEX: 27.17 KG/M2 | HEART RATE: 75 BPM | SYSTOLIC BLOOD PRESSURE: 97 MMHG | TEMPERATURE: 97.6 F | RESPIRATION RATE: 16 BRPM | OXYGEN SATURATION: 94 % | WEIGHT: 205 LBS | HEIGHT: 73 IN

## 2019-09-19 DIAGNOSIS — A31.0 PULMONARY MYCOBACTERIUM AVIUM COMPLEX (MAC) INFECTION (HCC): Primary | ICD-10-CM

## 2019-09-19 LAB
ALBUMIN SERPL-MCNC: 4.1 G/DL (ref 3.5–5.2)
ALP BLD-CCNC: 68 U/L (ref 40–129)
ALT SERPL-CCNC: 8 U/L (ref 0–40)
AMIKACIN DOSE: ABNORMAL
AMIKACIN TROUGH: 1.2 MCG/ML (ref 4–8)
ANION GAP SERPL CALCULATED.3IONS-SCNC: 10 MMOL/L (ref 7–16)
AST SERPL-CCNC: 14 U/L (ref 0–39)
BASOPHILS ABSOLUTE: 0.04 E9/L (ref 0–0.2)
BASOPHILS RELATIVE PERCENT: 0.9 % (ref 0–2)
BILIRUB SERPL-MCNC: 0.3 MG/DL (ref 0–1.2)
BUN BLDV-MCNC: 11 MG/DL (ref 8–23)
CALCIUM SERPL-MCNC: 9.4 MG/DL (ref 8.6–10.2)
CHLORIDE BLD-SCNC: 102 MMOL/L (ref 98–107)
CO2: 25 MMOL/L (ref 22–29)
CREAT SERPL-MCNC: 0.8 MG/DL (ref 0.7–1.2)
EOSINOPHILS ABSOLUTE: 0.21 E9/L (ref 0.05–0.5)
EOSINOPHILS RELATIVE PERCENT: 4.5 % (ref 0–6)
GFR AFRICAN AMERICAN: >60
GFR NON-AFRICAN AMERICAN: >60 ML/MIN/1.73
GLUCOSE BLD-MCNC: 97 MG/DL (ref 74–99)
HCT VFR BLD CALC: 47.2 % (ref 37–54)
HEMOGLOBIN: 15.5 G/DL (ref 12.5–16.5)
IMMATURE GRANULOCYTES #: 0.01 E9/L
IMMATURE GRANULOCYTES %: 0.2 % (ref 0–5)
LYMPHOCYTES ABSOLUTE: 1.12 E9/L (ref 1.5–4)
LYMPHOCYTES RELATIVE PERCENT: 24.2 % (ref 20–42)
MCH RBC QN AUTO: 28.9 PG (ref 26–35)
MCHC RBC AUTO-ENTMCNC: 32.8 % (ref 32–34.5)
MCV RBC AUTO: 87.9 FL (ref 80–99.9)
MONOCYTES ABSOLUTE: 0.35 E9/L (ref 0.1–0.95)
MONOCYTES RELATIVE PERCENT: 7.6 % (ref 2–12)
NEUTROPHILS ABSOLUTE: 2.9 E9/L (ref 1.8–7.3)
NEUTROPHILS RELATIVE PERCENT: 62.6 % (ref 43–80)
PDW BLD-RTO: 14.3 FL (ref 11.5–15)
PLATELET # BLD: 210 E9/L (ref 130–450)
PMV BLD AUTO: 9.9 FL (ref 7–12)
POTASSIUM SERPL-SCNC: 4.1 MMOL/L (ref 3.5–5)
RBC # BLD: 5.37 E12/L (ref 3.8–5.8)
SODIUM BLD-SCNC: 137 MMOL/L (ref 132–146)
TOTAL PROTEIN: 6.7 G/DL (ref 6.4–8.3)
WBC # BLD: 4.6 E9/L (ref 4.5–11.5)

## 2019-09-19 PROCEDURE — 80150 ASSAY OF AMIKACIN: CPT

## 2019-09-19 PROCEDURE — 80053 COMPREHEN METABOLIC PANEL: CPT

## 2019-09-19 PROCEDURE — 85025 COMPLETE CBC W/AUTO DIFF WBC: CPT

## 2019-09-19 PROCEDURE — 96365 THER/PROPH/DIAG IV INF INIT: CPT

## 2019-09-19 PROCEDURE — 2580000003 HC RX 258: Performed by: SPECIALIST

## 2019-09-19 PROCEDURE — 36415 COLL VENOUS BLD VENIPUNCTURE: CPT

## 2019-09-19 PROCEDURE — 6360000002 HC RX W HCPCS: Performed by: SPECIALIST

## 2019-09-19 RX ORDER — DIPHENHYDRAMINE HCL 25 MG
25 TABLET ORAL PRN
Status: CANCELLED
Start: 2019-09-20

## 2019-09-19 RX ORDER — EPINEPHRINE 1 MG/ML
0.3 INJECTION, SOLUTION, CONCENTRATE INTRAVENOUS PRN
Status: CANCELLED
Start: 2019-09-20

## 2019-09-19 RX ORDER — SODIUM CHLORIDE 0.9 % (FLUSH) 0.9 %
10 SYRINGE (ML) INJECTION PRN
Status: CANCELLED
Start: 2019-09-20

## 2019-09-19 RX ORDER — DIPHENHYDRAMINE HYDROCHLORIDE 50 MG/ML
50 INJECTION INTRAMUSCULAR; INTRAVENOUS ONCE
Status: CANCELLED
Start: 2019-09-20

## 2019-09-19 RX ORDER — SODIUM CHLORIDE 0.9 % (FLUSH) 0.9 %
10 SYRINGE (ML) INJECTION PRN
Status: DISCONTINUED | OUTPATIENT
Start: 2019-09-19 | End: 2019-09-20 | Stop reason: HOSPADM

## 2019-09-19 RX ORDER — DIPHENHYDRAMINE HCL 25 MG
50 TABLET ORAL ONCE
Status: CANCELLED
Start: 2019-09-20

## 2019-09-19 RX ADMIN — Medication 10 ML: at 07:33

## 2019-09-19 RX ADMIN — Medication 10 ML: at 08:43

## 2019-09-19 RX ADMIN — Medication 10 ML: at 08:42

## 2019-09-19 RX ADMIN — Medication 300 UNITS: at 08:43

## 2019-09-19 RX ADMIN — Medication 10 ML: at 07:35

## 2019-09-19 RX ADMIN — AMIKACIN SULFATE 1400 MG: 250 INJECTION, SOLUTION INTRAMUSCULAR; INTRAVENOUS at 07:36

## 2019-09-20 ENCOUNTER — HOSPITAL ENCOUNTER (OUTPATIENT)
Dept: INFUSION THERAPY | Age: 69
Setting detail: INFUSION SERIES
Discharge: HOME | End: 2019-09-20
Payer: MEDICARE

## 2019-09-20 VITALS
WEIGHT: 205 LBS | DIASTOLIC BLOOD PRESSURE: 67 MMHG | SYSTOLIC BLOOD PRESSURE: 111 MMHG | OXYGEN SATURATION: 93 % | HEART RATE: 80 BPM | BODY MASS INDEX: 29.41 KG/M2 | TEMPERATURE: 97.7 F

## 2019-09-20 DIAGNOSIS — A31.0 PULMONARY MYCOBACTERIUM AVIUM COMPLEX (MAC) INFECTION (HCC): Primary | ICD-10-CM

## 2019-09-20 PROCEDURE — 96365 THER/PROPH/DIAG IV INF INIT: CPT

## 2019-09-20 PROCEDURE — 2580000003 HC RX 258: Performed by: SPECIALIST

## 2019-09-20 PROCEDURE — 6360000002 HC RX W HCPCS: Performed by: SPECIALIST

## 2019-09-20 RX ORDER — SODIUM CHLORIDE 0.9 % (FLUSH) 0.9 %
10 SYRINGE (ML) INJECTION PRN
Status: CANCELLED
Start: 2019-09-21

## 2019-09-20 RX ORDER — EPINEPHRINE 1 MG/ML
0.3 INJECTION, SOLUTION, CONCENTRATE INTRAVENOUS PRN
Status: CANCELLED
Start: 2019-09-21

## 2019-09-20 RX ORDER — DIPHENHYDRAMINE HCL 25 MG
50 TABLET ORAL ONCE
Status: CANCELLED
Start: 2019-09-21

## 2019-09-20 RX ORDER — SODIUM CHLORIDE 0.9 % (FLUSH) 0.9 %
10 SYRINGE (ML) INJECTION PRN
Status: DISCONTINUED | OUTPATIENT
Start: 2019-09-20 | End: 2019-09-21 | Stop reason: HOSPADM

## 2019-09-20 RX ORDER — DIPHENHYDRAMINE HYDROCHLORIDE 50 MG/ML
50 INJECTION INTRAMUSCULAR; INTRAVENOUS ONCE
Status: CANCELLED
Start: 2019-09-21

## 2019-09-20 RX ORDER — DIPHENHYDRAMINE HCL 25 MG
25 TABLET ORAL PRN
Status: CANCELLED
Start: 2019-09-21

## 2019-09-20 RX ADMIN — Medication 10 ML: at 08:38

## 2019-09-20 RX ADMIN — Medication 10 ML: at 07:32

## 2019-09-20 RX ADMIN — AMIKACIN SULFATE 1400 MG: 250 INJECTION INTRAMUSCULAR; INTRAVENOUS at 07:32

## 2019-09-20 RX ADMIN — Medication 300 UNITS: at 08:39

## 2019-09-20 RX ADMIN — Medication 10 ML: at 08:39

## 2019-09-20 ASSESSMENT — PAIN SCALES - GENERAL: PAINLEVEL_OUTOF10: 0

## 2019-09-20 NOTE — PROGRESS NOTES
Called and spoke with patient notified med infusion will be 3x week now so he will plan infusions on Monday Wednesday and Friday and he will come tomorrow for PICC flush only

## 2019-09-21 ENCOUNTER — HOSPITAL ENCOUNTER (OUTPATIENT)
Dept: INFUSION THERAPY | Age: 69
Setting detail: INFUSION SERIES
Discharge: HOME | End: 2019-09-21
Payer: MEDICARE

## 2019-09-21 VITALS
SYSTOLIC BLOOD PRESSURE: 108 MMHG | TEMPERATURE: 97.7 F | WEIGHT: 205 LBS | HEIGHT: 70 IN | HEART RATE: 83 BPM | OXYGEN SATURATION: 93 % | BODY MASS INDEX: 29.35 KG/M2 | RESPIRATION RATE: 18 BRPM | DIASTOLIC BLOOD PRESSURE: 60 MMHG

## 2019-09-21 DIAGNOSIS — A31.0 PULMONARY MYCOBACTERIUM AVIUM COMPLEX (MAC) INFECTION (HCC): Primary | ICD-10-CM

## 2019-09-21 PROCEDURE — 6360000002 HC RX W HCPCS: Performed by: SPECIALIST

## 2019-09-21 PROCEDURE — 2580000003 HC RX 258: Performed by: SPECIALIST

## 2019-09-21 PROCEDURE — 96523 IRRIG DRUG DELIVERY DEVICE: CPT

## 2019-09-21 RX ORDER — SODIUM CHLORIDE 0.9 % (FLUSH) 0.9 %
10 SYRINGE (ML) INJECTION PRN
Status: CANCELLED
Start: 2019-09-22

## 2019-09-21 RX ORDER — DIPHENHYDRAMINE HYDROCHLORIDE 50 MG/ML
50 INJECTION INTRAMUSCULAR; INTRAVENOUS ONCE
Status: CANCELLED
Start: 2019-09-22

## 2019-09-21 RX ORDER — SODIUM CHLORIDE 0.9 % (FLUSH) 0.9 %
10 SYRINGE (ML) INJECTION PRN
Status: DISCONTINUED | OUTPATIENT
Start: 2019-09-21 | End: 2019-09-22 | Stop reason: HOSPADM

## 2019-09-21 RX ORDER — DIPHENHYDRAMINE HCL 25 MG
25 TABLET ORAL PRN
Status: CANCELLED
Start: 2019-09-22

## 2019-09-21 RX ORDER — DIPHENHYDRAMINE HCL 25 MG
50 TABLET ORAL ONCE
Status: CANCELLED
Start: 2019-09-22

## 2019-09-21 RX ORDER — EPINEPHRINE 1 MG/ML
0.3 INJECTION, SOLUTION, CONCENTRATE INTRAVENOUS PRN
Status: CANCELLED
Start: 2019-09-22

## 2019-09-21 RX ADMIN — Medication 10 ML: at 07:38

## 2019-09-21 RX ADMIN — Medication 10 ML: at 07:39

## 2019-09-21 RX ADMIN — Medication 300 UNITS: at 07:39

## 2019-09-21 ASSESSMENT — PAIN SCALES - GENERAL: PAINLEVEL_OUTOF10: 0

## 2019-09-21 NOTE — PROGRESS NOTES
Tolerated PICC flush well. Therapy plan reviewed with patient. Verbalizes understanding. Reviewed AVS with patient, reviewed medication information, verbalizes good knowledge of current plan, and has no signs or symptoms to report at this time. Declines copy of AVS. Patient instructed on s/s infection/complication with PICC line to report and instructed on keeping PICC dressing clean, dry and intact patient verbalizes understanding. Next appointment scheduled. Pt stated he was told he didn't need PICC flushed every day (getting amikacin 3x weekly) so he isnt sure he if he isn't coming tomorrow or not.

## 2019-09-22 ENCOUNTER — HOSPITAL ENCOUNTER (OUTPATIENT)
Dept: INFUSION THERAPY | Age: 69
Setting detail: INFUSION SERIES
End: 2019-09-22
Payer: MEDICARE

## 2019-09-23 ENCOUNTER — HOSPITAL ENCOUNTER (OUTPATIENT)
Dept: INFUSION THERAPY | Age: 69
Setting detail: INFUSION SERIES
Discharge: HOME | End: 2019-09-23
Payer: MEDICARE

## 2019-09-23 VITALS
SYSTOLIC BLOOD PRESSURE: 127 MMHG | RESPIRATION RATE: 18 BRPM | HEIGHT: 70 IN | DIASTOLIC BLOOD PRESSURE: 65 MMHG | HEART RATE: 84 BPM | BODY MASS INDEX: 29.35 KG/M2 | WEIGHT: 205 LBS | OXYGEN SATURATION: 94 % | TEMPERATURE: 97.8 F

## 2019-09-23 DIAGNOSIS — A31.0 PULMONARY MYCOBACTERIUM AVIUM COMPLEX (MAC) INFECTION (HCC): Primary | ICD-10-CM

## 2019-09-23 LAB
ALBUMIN SERPL-MCNC: 4.4 G/DL (ref 3.5–5.2)
ALP BLD-CCNC: 67 U/L (ref 40–129)
ALT SERPL-CCNC: 9 U/L (ref 0–40)
AMIKACIN DOSE: ABNORMAL
AMIKACIN TROUGH: <0.8 MCG/ML (ref 4–8)
ANION GAP SERPL CALCULATED.3IONS-SCNC: 13 MMOL/L (ref 7–16)
AST SERPL-CCNC: 15 U/L (ref 0–39)
BASOPHILS ABSOLUTE: 0.03 E9/L (ref 0–0.2)
BASOPHILS RELATIVE PERCENT: 0.6 % (ref 0–2)
BILIRUB SERPL-MCNC: 0.3 MG/DL (ref 0–1.2)
BUN BLDV-MCNC: 10 MG/DL (ref 8–23)
C-REACTIVE PROTEIN: 0.9 MG/DL (ref 0–0.4)
CALCIUM SERPL-MCNC: 9.7 MG/DL (ref 8.6–10.2)
CHLORIDE BLD-SCNC: 102 MMOL/L (ref 98–107)
CO2: 23 MMOL/L (ref 22–29)
CREAT SERPL-MCNC: 0.8 MG/DL (ref 0.7–1.2)
EOSINOPHILS ABSOLUTE: 0.22 E9/L (ref 0.05–0.5)
EOSINOPHILS RELATIVE PERCENT: 4.5 % (ref 0–6)
GFR AFRICAN AMERICAN: >60
GFR NON-AFRICAN AMERICAN: >60 ML/MIN/1.73
GLUCOSE BLD-MCNC: 136 MG/DL (ref 74–99)
HCT VFR BLD CALC: 47.6 % (ref 37–54)
HEMOGLOBIN: 15.8 G/DL (ref 12.5–16.5)
IMMATURE GRANULOCYTES #: 0.01 E9/L
IMMATURE GRANULOCYTES %: 0.2 % (ref 0–5)
LYMPHOCYTES ABSOLUTE: 1.39 E9/L (ref 1.5–4)
LYMPHOCYTES RELATIVE PERCENT: 28.6 % (ref 20–42)
MCH RBC QN AUTO: 29.2 PG (ref 26–35)
MCHC RBC AUTO-ENTMCNC: 33.2 % (ref 32–34.5)
MCV RBC AUTO: 88 FL (ref 80–99.9)
MONOCYTES ABSOLUTE: 0.38 E9/L (ref 0.1–0.95)
MONOCYTES RELATIVE PERCENT: 7.8 % (ref 2–12)
NEUTROPHILS ABSOLUTE: 2.83 E9/L (ref 1.8–7.3)
NEUTROPHILS RELATIVE PERCENT: 58.3 % (ref 43–80)
PDW BLD-RTO: 14.2 FL (ref 11.5–15)
PLATELET # BLD: 209 E9/L (ref 130–450)
PMV BLD AUTO: 9.9 FL (ref 7–12)
POTASSIUM SERPL-SCNC: 4.1 MMOL/L (ref 3.5–5)
RBC # BLD: 5.41 E12/L (ref 3.8–5.8)
SEDIMENTATION RATE, ERYTHROCYTE: 4 MM/HR (ref 0–15)
SODIUM BLD-SCNC: 138 MMOL/L (ref 132–146)
TOTAL PROTEIN: 7 G/DL (ref 6.4–8.3)
WBC # BLD: 4.9 E9/L (ref 4.5–11.5)

## 2019-09-23 PROCEDURE — 2580000003 HC RX 258: Performed by: SPECIALIST

## 2019-09-23 PROCEDURE — 85025 COMPLETE CBC W/AUTO DIFF WBC: CPT

## 2019-09-23 PROCEDURE — 86140 C-REACTIVE PROTEIN: CPT

## 2019-09-23 PROCEDURE — 80053 COMPREHEN METABOLIC PANEL: CPT

## 2019-09-23 PROCEDURE — 80150 ASSAY OF AMIKACIN: CPT

## 2019-09-23 PROCEDURE — 96365 THER/PROPH/DIAG IV INF INIT: CPT

## 2019-09-23 PROCEDURE — 6360000002 HC RX W HCPCS: Performed by: SPECIALIST

## 2019-09-23 PROCEDURE — 36415 COLL VENOUS BLD VENIPUNCTURE: CPT

## 2019-09-23 PROCEDURE — 85651 RBC SED RATE NONAUTOMATED: CPT

## 2019-09-23 RX ORDER — SODIUM CHLORIDE 0.9 % (FLUSH) 0.9 %
10 SYRINGE (ML) INJECTION PRN
Status: CANCELLED
Start: 2019-09-24

## 2019-09-23 RX ORDER — SODIUM CHLORIDE 0.9 % (FLUSH) 0.9 %
10 SYRINGE (ML) INJECTION PRN
Status: DISCONTINUED | OUTPATIENT
Start: 2019-09-23 | End: 2019-09-24 | Stop reason: HOSPADM

## 2019-09-23 RX ORDER — DIPHENHYDRAMINE HCL 25 MG
50 TABLET ORAL ONCE
Status: CANCELLED
Start: 2019-09-24

## 2019-09-23 RX ORDER — DIPHENHYDRAMINE HYDROCHLORIDE 50 MG/ML
50 INJECTION INTRAMUSCULAR; INTRAVENOUS ONCE
Status: CANCELLED
Start: 2019-09-24

## 2019-09-23 RX ORDER — DIPHENHYDRAMINE HCL 25 MG
25 TABLET ORAL PRN
Status: CANCELLED
Start: 2019-09-24

## 2019-09-23 RX ORDER — EPINEPHRINE 1 MG/ML
0.3 INJECTION, SOLUTION, CONCENTRATE INTRAVENOUS PRN
Status: CANCELLED
Start: 2019-09-24

## 2019-09-23 RX ADMIN — Medication 10 ML: at 08:33

## 2019-09-23 RX ADMIN — Medication 10 ML: at 08:32

## 2019-09-23 RX ADMIN — Medication 300 UNITS: at 08:33

## 2019-09-23 RX ADMIN — Medication 10 ML: at 07:27

## 2019-09-23 RX ADMIN — AMIKACIN SULFATE 1400 MG: 250 INJECTION INTRAMUSCULAR; INTRAVENOUS at 07:29

## 2019-09-23 RX ADMIN — Medication 10 ML: at 07:28

## 2019-09-23 ASSESSMENT — PAIN SCALES - GENERAL: PAINLEVEL_OUTOF10: 0

## 2019-09-23 NOTE — PROGRESS NOTES
Called and spoke with Rodrigo Buerger at Dr. Silvia Ge office notified her that patient did not come in Sunday just for PICC flush and notified her that patient reported today that he felt little more SOB over the weekend and that he said when he coughs the sputum has a color to it now more yellowish

## 2019-09-24 ENCOUNTER — HOSPITAL ENCOUNTER (OUTPATIENT)
Dept: INFUSION THERAPY | Age: 69
Setting detail: INFUSION SERIES
Discharge: HOME | End: 2019-09-24
Payer: MEDICARE

## 2019-09-24 RX ORDER — DIPHENHYDRAMINE HCL 25 MG
50 TABLET ORAL ONCE
Status: CANCELLED
Start: 2019-09-25

## 2019-09-24 RX ORDER — EPINEPHRINE 1 MG/ML
0.3 INJECTION, SOLUTION, CONCENTRATE INTRAVENOUS PRN
Status: CANCELLED
Start: 2019-09-25

## 2019-09-24 RX ORDER — SODIUM CHLORIDE 0.9 % (FLUSH) 0.9 %
10 SYRINGE (ML) INJECTION PRN
Status: CANCELLED
Start: 2019-09-25

## 2019-09-24 RX ORDER — DIPHENHYDRAMINE HCL 25 MG
25 TABLET ORAL PRN
Status: CANCELLED
Start: 2019-09-25

## 2019-09-24 RX ORDER — DIPHENHYDRAMINE HYDROCHLORIDE 50 MG/ML
50 INJECTION INTRAMUSCULAR; INTRAVENOUS ONCE
Status: CANCELLED
Start: 2019-09-25

## 2019-09-25 ENCOUNTER — HOSPITAL ENCOUNTER (OUTPATIENT)
Dept: INFUSION THERAPY | Age: 69
Setting detail: INFUSION SERIES
Discharge: HOME | End: 2019-09-25
Payer: MEDICARE

## 2019-09-25 VITALS
WEIGHT: 205 LBS | BODY MASS INDEX: 29.35 KG/M2 | HEART RATE: 76 BPM | SYSTOLIC BLOOD PRESSURE: 109 MMHG | DIASTOLIC BLOOD PRESSURE: 64 MMHG | TEMPERATURE: 97.9 F | HEIGHT: 70 IN | OXYGEN SATURATION: 92 % | RESPIRATION RATE: 18 BRPM

## 2019-09-25 DIAGNOSIS — A31.0 PULMONARY MYCOBACTERIUM AVIUM COMPLEX (MAC) INFECTION (HCC): Primary | ICD-10-CM

## 2019-09-25 PROCEDURE — 6360000002 HC RX W HCPCS: Performed by: SPECIALIST

## 2019-09-25 PROCEDURE — 2580000003 HC RX 258: Performed by: SPECIALIST

## 2019-09-25 PROCEDURE — 96365 THER/PROPH/DIAG IV INF INIT: CPT

## 2019-09-25 RX ORDER — DIPHENHYDRAMINE HCL 25 MG
50 TABLET ORAL ONCE
Status: CANCELLED
Start: 2019-09-26

## 2019-09-25 RX ORDER — SODIUM CHLORIDE 0.9 % (FLUSH) 0.9 %
10 SYRINGE (ML) INJECTION PRN
Status: DISCONTINUED | OUTPATIENT
Start: 2019-09-25 | End: 2019-09-26 | Stop reason: HOSPADM

## 2019-09-25 RX ORDER — SODIUM CHLORIDE 0.9 % (FLUSH) 0.9 %
10 SYRINGE (ML) INJECTION PRN
Status: CANCELLED
Start: 2019-09-26

## 2019-09-25 RX ORDER — DIPHENHYDRAMINE HCL 25 MG
25 TABLET ORAL PRN
Status: CANCELLED
Start: 2019-09-26

## 2019-09-25 RX ORDER — DIPHENHYDRAMINE HYDROCHLORIDE 50 MG/ML
50 INJECTION INTRAMUSCULAR; INTRAVENOUS ONCE
Status: CANCELLED
Start: 2019-09-26

## 2019-09-25 RX ORDER — EPINEPHRINE 1 MG/ML
0.3 INJECTION, SOLUTION, CONCENTRATE INTRAVENOUS PRN
Status: CANCELLED
Start: 2019-09-26

## 2019-09-25 RX ADMIN — Medication 10 ML: at 08:32

## 2019-09-25 RX ADMIN — Medication 10 ML: at 07:28

## 2019-09-25 RX ADMIN — Medication 10 ML: at 08:33

## 2019-09-25 RX ADMIN — Medication 300 UNITS: at 08:33

## 2019-09-25 RX ADMIN — AMIKACIN SULFATE 1400 MG: 250 INJECTION INTRAMUSCULAR; INTRAVENOUS at 07:29

## 2019-09-25 ASSESSMENT — PAIN SCALES - GENERAL: PAINLEVEL_OUTOF10: 0

## 2019-09-26 ENCOUNTER — HOSPITAL ENCOUNTER (OUTPATIENT)
Dept: INFUSION THERAPY | Age: 69
Setting detail: INFUSION SERIES
Discharge: HOME | End: 2019-09-26
Payer: MEDICARE

## 2019-09-26 NOTE — PROGRESS NOTES
Did not show for PICC flush faxed notification to Dr. Haseeb Masterson office that he is coming Monday Wednesday and Friday for IV amikacin but not coming off days for flush only

## 2019-09-27 ENCOUNTER — HOSPITAL ENCOUNTER (OUTPATIENT)
Dept: INFUSION THERAPY | Age: 69
Setting detail: INFUSION SERIES
Discharge: HOME | End: 2019-09-27
Payer: MEDICARE

## 2019-09-27 VITALS
BODY MASS INDEX: 29.35 KG/M2 | OXYGEN SATURATION: 95 % | TEMPERATURE: 98.6 F | WEIGHT: 205 LBS | HEIGHT: 70 IN | HEART RATE: 88 BPM | RESPIRATION RATE: 18 BRPM | DIASTOLIC BLOOD PRESSURE: 70 MMHG | SYSTOLIC BLOOD PRESSURE: 110 MMHG

## 2019-09-27 DIAGNOSIS — A31.0 PULMONARY MYCOBACTERIUM AVIUM COMPLEX (MAC) INFECTION (HCC): Primary | ICD-10-CM

## 2019-09-27 LAB
ALBUMIN SERPL-MCNC: 4.2 G/DL (ref 3.5–5.2)
ALP BLD-CCNC: 71 U/L (ref 40–129)
ALT SERPL-CCNC: 7 U/L (ref 0–40)
AMIKACIN DOSE: ABNORMAL
AMIKACIN TROUGH: <0.8 MCG/ML (ref 4–8)
ANION GAP SERPL CALCULATED.3IONS-SCNC: 12 MMOL/L (ref 7–16)
AST SERPL-CCNC: 14 U/L (ref 0–39)
BASOPHILS ABSOLUTE: 0.04 E9/L (ref 0–0.2)
BASOPHILS RELATIVE PERCENT: 0.8 % (ref 0–2)
BILIRUB SERPL-MCNC: 0.2 MG/DL (ref 0–1.2)
BUN BLDV-MCNC: 13 MG/DL (ref 8–23)
CALCIUM SERPL-MCNC: 9.8 MG/DL (ref 8.6–10.2)
CHLORIDE BLD-SCNC: 103 MMOL/L (ref 98–107)
CO2: 24 MMOL/L (ref 22–29)
CREAT SERPL-MCNC: 0.8 MG/DL (ref 0.7–1.2)
EOSINOPHILS ABSOLUTE: 0.18 E9/L (ref 0.05–0.5)
EOSINOPHILS RELATIVE PERCENT: 3.6 % (ref 0–6)
GFR AFRICAN AMERICAN: >60
GFR NON-AFRICAN AMERICAN: >60 ML/MIN/1.73
GLUCOSE BLD-MCNC: 93 MG/DL (ref 74–99)
HCT VFR BLD CALC: 48.2 % (ref 37–54)
HEMOGLOBIN: 15.9 G/DL (ref 12.5–16.5)
IMMATURE GRANULOCYTES #: 0.01 E9/L
IMMATURE GRANULOCYTES %: 0.2 % (ref 0–5)
LYMPHOCYTES ABSOLUTE: 1.25 E9/L (ref 1.5–4)
LYMPHOCYTES RELATIVE PERCENT: 25.2 % (ref 20–42)
MCH RBC QN AUTO: 29 PG (ref 26–35)
MCHC RBC AUTO-ENTMCNC: 33 % (ref 32–34.5)
MCV RBC AUTO: 88 FL (ref 80–99.9)
MONOCYTES ABSOLUTE: 0.45 E9/L (ref 0.1–0.95)
MONOCYTES RELATIVE PERCENT: 9.1 % (ref 2–12)
NEUTROPHILS ABSOLUTE: 3.03 E9/L (ref 1.8–7.3)
NEUTROPHILS RELATIVE PERCENT: 61.1 % (ref 43–80)
PDW BLD-RTO: 14.3 FL (ref 11.5–15)
PLATELET # BLD: 206 E9/L (ref 130–450)
PMV BLD AUTO: 10 FL (ref 7–12)
POTASSIUM SERPL-SCNC: 4.2 MMOL/L (ref 3.5–5)
RBC # BLD: 5.48 E12/L (ref 3.8–5.8)
SODIUM BLD-SCNC: 139 MMOL/L (ref 132–146)
TOTAL PROTEIN: 6.8 G/DL (ref 6.4–8.3)
WBC # BLD: 5 E9/L (ref 4.5–11.5)

## 2019-09-27 PROCEDURE — 80150 ASSAY OF AMIKACIN: CPT

## 2019-09-27 PROCEDURE — 85025 COMPLETE CBC W/AUTO DIFF WBC: CPT

## 2019-09-27 PROCEDURE — 96365 THER/PROPH/DIAG IV INF INIT: CPT

## 2019-09-27 PROCEDURE — 6360000002 HC RX W HCPCS: Performed by: SPECIALIST

## 2019-09-27 PROCEDURE — 80053 COMPREHEN METABOLIC PANEL: CPT

## 2019-09-27 PROCEDURE — 36415 COLL VENOUS BLD VENIPUNCTURE: CPT

## 2019-09-27 PROCEDURE — 2580000003 HC RX 258: Performed by: SPECIALIST

## 2019-09-27 RX ORDER — SODIUM CHLORIDE 0.9 % (FLUSH) 0.9 %
10 SYRINGE (ML) INJECTION PRN
Status: DISCONTINUED | OUTPATIENT
Start: 2019-09-27 | End: 2019-09-28 | Stop reason: HOSPADM

## 2019-09-27 RX ORDER — SODIUM CHLORIDE 0.9 % (FLUSH) 0.9 %
10 SYRINGE (ML) INJECTION PRN
Status: CANCELLED
Start: 2019-09-28

## 2019-09-27 RX ORDER — EPINEPHRINE 1 MG/ML
0.3 INJECTION, SOLUTION, CONCENTRATE INTRAVENOUS PRN
Status: CANCELLED
Start: 2019-09-28

## 2019-09-27 RX ORDER — DIPHENHYDRAMINE HYDROCHLORIDE 50 MG/ML
50 INJECTION INTRAMUSCULAR; INTRAVENOUS ONCE
Status: CANCELLED
Start: 2019-09-28

## 2019-09-27 RX ORDER — DIPHENHYDRAMINE HCL 25 MG
50 TABLET ORAL ONCE
Status: CANCELLED
Start: 2019-09-28

## 2019-09-27 RX ORDER — DIPHENHYDRAMINE HCL 25 MG
25 TABLET ORAL PRN
Status: CANCELLED
Start: 2019-09-28

## 2019-09-27 RX ADMIN — Medication 300 UNITS: at 08:44

## 2019-09-27 RX ADMIN — Medication 10 ML: at 07:30

## 2019-09-27 RX ADMIN — AMIKACIN SULFATE 1400 MG: 250 INJECTION INTRAMUSCULAR; INTRAVENOUS at 07:34

## 2019-09-27 RX ADMIN — Medication 10 ML: at 08:43

## 2019-09-27 RX ADMIN — Medication 10 ML: at 07:32

## 2019-09-27 RX ADMIN — Medication 10 ML: at 08:44

## 2019-09-28 ENCOUNTER — HOSPITAL ENCOUNTER (OUTPATIENT)
Dept: INFUSION THERAPY | Age: 69
Setting detail: INFUSION SERIES
Discharge: HOME | End: 2019-09-28
Payer: MEDICARE

## 2019-09-29 ENCOUNTER — HOSPITAL ENCOUNTER (OUTPATIENT)
Dept: INFUSION THERAPY | Age: 69
Setting detail: INFUSION SERIES
Discharge: HOME | DRG: 189 | End: 2019-09-29
Payer: MEDICARE

## 2019-09-29 VITALS
SYSTOLIC BLOOD PRESSURE: 114 MMHG | OXYGEN SATURATION: 95 % | RESPIRATION RATE: 18 BRPM | HEIGHT: 70 IN | HEART RATE: 82 BPM | WEIGHT: 205 LBS | BODY MASS INDEX: 29.35 KG/M2 | DIASTOLIC BLOOD PRESSURE: 71 MMHG | TEMPERATURE: 97.7 F

## 2019-09-29 DIAGNOSIS — A31.0 PULMONARY MYCOBACTERIUM AVIUM COMPLEX (MAC) INFECTION (HCC): Primary | ICD-10-CM

## 2019-09-29 PROCEDURE — 6360000002 HC RX W HCPCS: Performed by: SPECIALIST

## 2019-09-29 PROCEDURE — 2580000003 HC RX 258: Performed by: SPECIALIST

## 2019-09-29 PROCEDURE — 96523 IRRIG DRUG DELIVERY DEVICE: CPT

## 2019-09-29 RX ORDER — DIPHENHYDRAMINE HCL 25 MG
25 TABLET ORAL PRN
Status: CANCELLED
Start: 2019-09-30

## 2019-09-29 RX ORDER — SODIUM CHLORIDE 0.9 % (FLUSH) 0.9 %
10 SYRINGE (ML) INJECTION PRN
Status: DISCONTINUED | OUTPATIENT
Start: 2019-09-29 | End: 2019-09-30 | Stop reason: HOSPADM

## 2019-09-29 RX ORDER — EPINEPHRINE 1 MG/ML
0.3 INJECTION, SOLUTION, CONCENTRATE INTRAVENOUS PRN
Status: CANCELLED
Start: 2019-09-30

## 2019-09-29 RX ORDER — DIPHENHYDRAMINE HCL 25 MG
50 TABLET ORAL ONCE
Status: CANCELLED
Start: 2019-09-30

## 2019-09-29 RX ORDER — DIPHENHYDRAMINE HYDROCHLORIDE 50 MG/ML
50 INJECTION INTRAMUSCULAR; INTRAVENOUS ONCE
Status: CANCELLED
Start: 2019-09-30

## 2019-09-29 RX ORDER — SODIUM CHLORIDE 0.9 % (FLUSH) 0.9 %
10 SYRINGE (ML) INJECTION PRN
Status: CANCELLED
Start: 2019-09-30

## 2019-09-29 RX ADMIN — Medication 10 ML: at 08:01

## 2019-09-29 RX ADMIN — Medication 300 UNITS: at 08:01

## 2019-09-29 ASSESSMENT — PAIN DESCRIPTION - ONSET: ONSET: ON-GOING

## 2019-09-29 ASSESSMENT — PAIN SCALES - GENERAL: PAINLEVEL_OUTOF10: 0

## 2019-09-29 ASSESSMENT — PAIN DESCRIPTION - LOCATION: LOCATION: HIP;BACK

## 2019-09-29 ASSESSMENT — PAIN DESCRIPTION - FREQUENCY: FREQUENCY: INTERMITTENT

## 2019-09-29 ASSESSMENT — PAIN DESCRIPTION - PROGRESSION: CLINICAL_PROGRESSION: NOT CHANGED

## 2019-09-29 ASSESSMENT — PAIN DESCRIPTION - PAIN TYPE: TYPE: CHRONIC PAIN

## 2019-09-29 ASSESSMENT — PAIN DESCRIPTION - DESCRIPTORS: DESCRIPTORS: ACHING

## 2019-09-29 ASSESSMENT — PAIN DESCRIPTION - ORIENTATION: ORIENTATION: LEFT

## 2019-09-29 NOTE — PROGRESS NOTES
Tolerated IV flush well. Therapy plan reviewed with patient. Verbalizes understanding. Reviewed AVS with patient, reviewed medication information, verbalizes good knowledge of current plan, and has no signs or symptoms to report at this time. Declines copy of AVS. Patient instructed on s/s infection/complication with PICC line to report and instructed on keeping PICC dressing clean, dry and intact patient verbalizes understanding. Next appointment scheduled.

## 2019-09-30 ENCOUNTER — HOSPITAL ENCOUNTER (OUTPATIENT)
Dept: INFUSION THERAPY | Age: 69
Setting detail: INFUSION SERIES
Discharge: HOME | DRG: 189 | End: 2019-09-30
Payer: MEDICARE

## 2019-09-30 VITALS
BODY MASS INDEX: 29.35 KG/M2 | SYSTOLIC BLOOD PRESSURE: 121 MMHG | WEIGHT: 205 LBS | DIASTOLIC BLOOD PRESSURE: 68 MMHG | TEMPERATURE: 97.6 F | HEART RATE: 77 BPM | RESPIRATION RATE: 18 BRPM | HEIGHT: 70 IN | OXYGEN SATURATION: 93 %

## 2019-09-30 DIAGNOSIS — A31.0 PULMONARY MYCOBACTERIUM AVIUM COMPLEX (MAC) INFECTION (HCC): Primary | ICD-10-CM

## 2019-09-30 LAB
ALBUMIN SERPL-MCNC: 4.2 G/DL (ref 3.5–5.2)
ALP BLD-CCNC: 66 U/L (ref 40–129)
ALT SERPL-CCNC: 8 U/L (ref 0–40)
AMIKACIN DOSE: ABNORMAL
AMIKACIN TROUGH: <0.8 MCG/ML (ref 4–8)
ANION GAP SERPL CALCULATED.3IONS-SCNC: 11 MMOL/L (ref 7–16)
AST SERPL-CCNC: 14 U/L (ref 0–39)
BASOPHILS ABSOLUTE: 0.03 E9/L (ref 0–0.2)
BASOPHILS RELATIVE PERCENT: 0.5 % (ref 0–2)
BILIRUB SERPL-MCNC: 0.2 MG/DL (ref 0–1.2)
BUN BLDV-MCNC: 11 MG/DL (ref 8–23)
C-REACTIVE PROTEIN: 0.4 MG/DL (ref 0–0.4)
CALCIUM SERPL-MCNC: 9.5 MG/DL (ref 8.6–10.2)
CHLORIDE BLD-SCNC: 102 MMOL/L (ref 98–107)
CO2: 24 MMOL/L (ref 22–29)
CREAT SERPL-MCNC: 0.7 MG/DL (ref 0.7–1.2)
EOSINOPHILS ABSOLUTE: 0.18 E9/L (ref 0.05–0.5)
EOSINOPHILS RELATIVE PERCENT: 2.9 % (ref 0–6)
GFR AFRICAN AMERICAN: >60
GFR NON-AFRICAN AMERICAN: >60 ML/MIN/1.73
GLUCOSE BLD-MCNC: 97 MG/DL (ref 74–99)
HCT VFR BLD CALC: 46.7 % (ref 37–54)
HEMOGLOBIN: 15.5 G/DL (ref 12.5–16.5)
IMMATURE GRANULOCYTES #: 0.03 E9/L
IMMATURE GRANULOCYTES %: 0.5 % (ref 0–5)
LYMPHOCYTES ABSOLUTE: 1.11 E9/L (ref 1.5–4)
LYMPHOCYTES RELATIVE PERCENT: 17.6 % (ref 20–42)
MCH RBC QN AUTO: 29.4 PG (ref 26–35)
MCHC RBC AUTO-ENTMCNC: 33.2 % (ref 32–34.5)
MCV RBC AUTO: 88.6 FL (ref 80–99.9)
MONOCYTES ABSOLUTE: 0.46 E9/L (ref 0.1–0.95)
MONOCYTES RELATIVE PERCENT: 7.3 % (ref 2–12)
NEUTROPHILS ABSOLUTE: 4.5 E9/L (ref 1.8–7.3)
NEUTROPHILS RELATIVE PERCENT: 71.2 % (ref 43–80)
PDW BLD-RTO: 14.4 FL (ref 11.5–15)
PLATELET # BLD: 195 E9/L (ref 130–450)
PMV BLD AUTO: 10.1 FL (ref 7–12)
POTASSIUM SERPL-SCNC: 4.1 MMOL/L (ref 3.5–5)
RBC # BLD: 5.27 E12/L (ref 3.8–5.8)
SEDIMENTATION RATE, ERYTHROCYTE: 2 MM/HR (ref 0–15)
SODIUM BLD-SCNC: 137 MMOL/L (ref 132–146)
TOTAL PROTEIN: 6.7 G/DL (ref 6.4–8.3)
WBC # BLD: 6.3 E9/L (ref 4.5–11.5)

## 2019-09-30 PROCEDURE — 85025 COMPLETE CBC W/AUTO DIFF WBC: CPT

## 2019-09-30 PROCEDURE — 96365 THER/PROPH/DIAG IV INF INIT: CPT

## 2019-09-30 PROCEDURE — 86140 C-REACTIVE PROTEIN: CPT

## 2019-09-30 PROCEDURE — 2580000003 HC RX 258: Performed by: SPECIALIST

## 2019-09-30 PROCEDURE — 80150 ASSAY OF AMIKACIN: CPT

## 2019-09-30 PROCEDURE — 80053 COMPREHEN METABOLIC PANEL: CPT

## 2019-09-30 PROCEDURE — 6360000002 HC RX W HCPCS: Performed by: SPECIALIST

## 2019-09-30 PROCEDURE — 85651 RBC SED RATE NONAUTOMATED: CPT

## 2019-09-30 PROCEDURE — 36415 COLL VENOUS BLD VENIPUNCTURE: CPT

## 2019-09-30 RX ORDER — SODIUM CHLORIDE 0.9 % (FLUSH) 0.9 %
10 SYRINGE (ML) INJECTION PRN
Status: DISCONTINUED | OUTPATIENT
Start: 2019-09-30 | End: 2019-10-01 | Stop reason: HOSPADM

## 2019-09-30 RX ORDER — DIPHENHYDRAMINE HCL 25 MG
25 TABLET ORAL PRN
Status: CANCELLED
Start: 2019-10-01

## 2019-09-30 RX ORDER — SODIUM CHLORIDE 0.9 % (FLUSH) 0.9 %
10 SYRINGE (ML) INJECTION PRN
Status: CANCELLED
Start: 2019-10-01

## 2019-09-30 RX ORDER — DIPHENHYDRAMINE HYDROCHLORIDE 50 MG/ML
50 INJECTION INTRAMUSCULAR; INTRAVENOUS ONCE
Status: CANCELLED
Start: 2019-10-01

## 2019-09-30 RX ORDER — EPINEPHRINE 1 MG/ML
0.3 INJECTION, SOLUTION, CONCENTRATE INTRAVENOUS PRN
Status: CANCELLED
Start: 2019-10-01

## 2019-09-30 RX ORDER — DIPHENHYDRAMINE HCL 25 MG
50 TABLET ORAL ONCE
Status: CANCELLED
Start: 2019-10-01

## 2019-09-30 RX ADMIN — AMIKACIN SULFATE 1400 MG: 250 INJECTION INTRAMUSCULAR; INTRAVENOUS at 07:37

## 2019-09-30 RX ADMIN — Medication 10 ML: at 08:48

## 2019-09-30 RX ADMIN — Medication 10 ML: at 07:34

## 2019-09-30 RX ADMIN — Medication 10 ML: at 07:35

## 2019-09-30 RX ADMIN — Medication 300 UNITS: at 08:49

## 2019-09-30 RX ADMIN — Medication 10 ML: at 08:49

## 2019-09-30 ASSESSMENT — PAIN DESCRIPTION - DESCRIPTORS: DESCRIPTORS: CONSTANT

## 2019-09-30 ASSESSMENT — PAIN DESCRIPTION - PROGRESSION: CLINICAL_PROGRESSION: NOT CHANGED

## 2019-09-30 ASSESSMENT — PAIN DESCRIPTION - FREQUENCY: FREQUENCY: INTERMITTENT

## 2019-09-30 ASSESSMENT — PAIN DESCRIPTION - ORIENTATION: ORIENTATION: LEFT

## 2019-09-30 ASSESSMENT — PAIN SCALES - GENERAL: PAINLEVEL_OUTOF10: 0

## 2019-09-30 ASSESSMENT — PAIN DESCRIPTION - PAIN TYPE: TYPE: CHRONIC PAIN

## 2019-09-30 ASSESSMENT — PAIN DESCRIPTION - LOCATION: LOCATION: BACK;HIP

## 2019-09-30 ASSESSMENT — PAIN DESCRIPTION - ONSET: ONSET: ON-GOING

## 2019-10-01 ENCOUNTER — HOSPITAL ENCOUNTER (OUTPATIENT)
Dept: INFUSION THERAPY | Age: 69
Setting detail: INFUSION SERIES
Discharge: HOME | End: 2019-10-01
Payer: MEDICARE

## 2019-10-01 DIAGNOSIS — A31.0 PULMONARY MYCOBACTERIUM AVIUM COMPLEX (MAC) INFECTION (HCC): Primary | ICD-10-CM

## 2019-10-01 RX ORDER — DIPHENHYDRAMINE HYDROCHLORIDE 50 MG/ML
50 INJECTION INTRAMUSCULAR; INTRAVENOUS ONCE
Status: CANCELLED
Start: 2019-10-02

## 2019-10-01 RX ORDER — EPINEPHRINE 1 MG/ML
0.3 INJECTION, SOLUTION, CONCENTRATE INTRAVENOUS PRN
Status: CANCELLED
Start: 2019-10-02

## 2019-10-01 RX ORDER — SODIUM CHLORIDE 0.9 % (FLUSH) 0.9 %
10 SYRINGE (ML) INJECTION PRN
Status: CANCELLED
Start: 2019-10-02

## 2019-10-01 RX ORDER — SODIUM CHLORIDE 0.9 % (FLUSH) 0.9 %
10 SYRINGE (ML) INJECTION PRN
Status: DISCONTINUED | OUTPATIENT
Start: 2019-10-01 | End: 2019-10-02 | Stop reason: HOSPADM

## 2019-10-01 RX ORDER — DIPHENHYDRAMINE HCL 25 MG
50 TABLET ORAL ONCE
Status: CANCELLED
Start: 2019-10-02

## 2019-10-01 RX ORDER — DIPHENHYDRAMINE HCL 25 MG
25 TABLET ORAL PRN
Status: CANCELLED
Start: 2019-10-02

## 2019-10-02 ENCOUNTER — HOSPITAL ENCOUNTER (OUTPATIENT)
Dept: INFUSION THERAPY | Age: 69
Setting detail: INFUSION SERIES
Discharge: HOME | DRG: 189 | End: 2019-10-02
Payer: MEDICARE

## 2019-10-02 ENCOUNTER — APPOINTMENT (OUTPATIENT)
Dept: GENERAL RADIOLOGY | Age: 69
DRG: 189 | End: 2019-10-02
Payer: MEDICARE

## 2019-10-02 ENCOUNTER — HOSPITAL ENCOUNTER (INPATIENT)
Age: 69
LOS: 5 days | Discharge: HOME | DRG: 189 | End: 2019-10-07
Attending: EMERGENCY MEDICINE | Admitting: INTERNAL MEDICINE
Payer: MEDICARE

## 2019-10-02 VITALS
TEMPERATURE: 98.1 F | DIASTOLIC BLOOD PRESSURE: 69 MMHG | HEART RATE: 86 BPM | OXYGEN SATURATION: 92 % | RESPIRATION RATE: 18 BRPM | WEIGHT: 205 LBS | SYSTOLIC BLOOD PRESSURE: 107 MMHG | BODY MASS INDEX: 29.41 KG/M2

## 2019-10-02 DIAGNOSIS — J96.01 ACUTE RESPIRATORY FAILURE WITH HYPOXIA (HCC): Primary | ICD-10-CM

## 2019-10-02 DIAGNOSIS — A31.0 PULMONARY MYCOBACTERIUM AVIUM COMPLEX (MAC) INFECTION (HCC): Primary | ICD-10-CM

## 2019-10-02 LAB
ALBUMIN SERPL-MCNC: 4 G/DL (ref 3.5–5.2)
ALP BLD-CCNC: 62 U/L (ref 40–129)
ALT SERPL-CCNC: 8 U/L (ref 0–40)
ANION GAP SERPL CALCULATED.3IONS-SCNC: 11 MMOL/L (ref 7–16)
AST SERPL-CCNC: 14 U/L (ref 0–39)
BACTERIA: NORMAL /HPF
BASOPHILS ABSOLUTE: 0.03 E9/L (ref 0–0.2)
BASOPHILS RELATIVE PERCENT: 0.4 % (ref 0–2)
BILIRUB SERPL-MCNC: 0.4 MG/DL (ref 0–1.2)
BILIRUBIN URINE: ABNORMAL
BLOOD, URINE: NEGATIVE
BUN BLDV-MCNC: 15 MG/DL (ref 8–23)
CALCIUM SERPL-MCNC: 9.6 MG/DL (ref 8.6–10.2)
CASTS 2: NORMAL /LPF
CASTS: NORMAL /LPF
CHLORIDE BLD-SCNC: 101 MMOL/L (ref 98–107)
CLARITY: CLEAR
CO2: 23 MMOL/L (ref 22–29)
COLOR: YELLOW
CREAT SERPL-MCNC: 0.8 MG/DL (ref 0.7–1.2)
D DIMER: 330 NG/ML DDU
EOSINOPHILS ABSOLUTE: 0.09 E9/L (ref 0.05–0.5)
EOSINOPHILS RELATIVE PERCENT: 1.3 % (ref 0–6)
GFR AFRICAN AMERICAN: >60
GFR NON-AFRICAN AMERICAN: >60 ML/MIN/1.73
GLUCOSE BLD-MCNC: 91 MG/DL (ref 74–99)
GLUCOSE URINE: NEGATIVE MG/DL
HCT VFR BLD CALC: 46.4 % (ref 37–54)
HEMOGLOBIN: 15.2 G/DL (ref 12.5–16.5)
IMMATURE GRANULOCYTES #: 0.02 E9/L
IMMATURE GRANULOCYTES %: 0.3 % (ref 0–5)
KETONES, URINE: NEGATIVE MG/DL
LACTIC ACID: 0.9 MMOL/L (ref 0.5–2.2)
LEUKOCYTE ESTERASE, URINE: NEGATIVE
LYMPHOCYTES ABSOLUTE: 1.11 E9/L (ref 1.5–4)
LYMPHOCYTES RELATIVE PERCENT: 16.3 % (ref 20–42)
MCH RBC QN AUTO: 28.9 PG (ref 26–35)
MCHC RBC AUTO-ENTMCNC: 32.8 % (ref 32–34.5)
MCV RBC AUTO: 88.2 FL (ref 80–99.9)
MONOCYTES ABSOLUTE: 0.55 E9/L (ref 0.1–0.95)
MONOCYTES RELATIVE PERCENT: 8.1 % (ref 2–12)
NEUTROPHILS ABSOLUTE: 5.01 E9/L (ref 1.8–7.3)
NEUTROPHILS RELATIVE PERCENT: 73.6 % (ref 43–80)
NITRITE, URINE: NEGATIVE
PDW BLD-RTO: 14.4 FL (ref 11.5–15)
PH UA: 5 (ref 5–9)
PLATELET # BLD: 202 E9/L (ref 130–450)
PMV BLD AUTO: 10.1 FL (ref 7–12)
POTASSIUM REFLEX MAGNESIUM: 3.8 MMOL/L (ref 3.5–5)
PRO-BNP: 189 PG/ML (ref 0–125)
PROTEIN UA: ABNORMAL MG/DL
RBC # BLD: 5.26 E12/L (ref 3.8–5.8)
RBC UA: NORMAL /HPF (ref 0–2)
SODIUM BLD-SCNC: 135 MMOL/L (ref 132–146)
SPECIFIC GRAVITY UA: >=1.03 (ref 1–1.03)
TOTAL PROTEIN: 6.7 G/DL (ref 6.4–8.3)
TROPONIN: <0.01 NG/ML (ref 0–0.03)
UROBILINOGEN, URINE: 0.2 E.U./DL
WBC # BLD: 6.8 E9/L (ref 4.5–11.5)
WBC UA: NORMAL /HPF (ref 0–5)

## 2019-10-02 PROCEDURE — 6360000002 HC RX W HCPCS: Performed by: SPECIALIST

## 2019-10-02 PROCEDURE — 84145 PROCALCITONIN (PCT): CPT

## 2019-10-02 PROCEDURE — 87798 DETECT AGENT NOS DNA AMP: CPT

## 2019-10-02 PROCEDURE — 87633 RESP VIRUS 12-25 TARGETS: CPT

## 2019-10-02 PROCEDURE — 87581 M.PNEUMON DNA AMP PROBE: CPT

## 2019-10-02 PROCEDURE — 93005 ELECTROCARDIOGRAM TRACING: CPT | Performed by: EMERGENCY MEDICINE

## 2019-10-02 PROCEDURE — 83036 HEMOGLOBIN GLYCOSYLATED A1C: CPT

## 2019-10-02 PROCEDURE — 82746 ASSAY OF FOLIC ACID SERUM: CPT

## 2019-10-02 PROCEDURE — 83880 ASSAY OF NATRIURETIC PEPTIDE: CPT

## 2019-10-02 PROCEDURE — 99285 EMERGENCY DEPT VISIT HI MDM: CPT

## 2019-10-02 PROCEDURE — 6360000002 HC RX W HCPCS: Performed by: INTERNAL MEDICINE

## 2019-10-02 PROCEDURE — 2580000003 HC RX 258: Performed by: SPECIALIST

## 2019-10-02 PROCEDURE — 2060000000 HC ICU INTERMEDIATE R&B

## 2019-10-02 PROCEDURE — 87040 BLOOD CULTURE FOR BACTERIA: CPT

## 2019-10-02 PROCEDURE — 80053 COMPREHEN METABOLIC PANEL: CPT

## 2019-10-02 PROCEDURE — 71046 X-RAY EXAM CHEST 2 VIEWS: CPT

## 2019-10-02 PROCEDURE — 94640 AIRWAY INHALATION TREATMENT: CPT

## 2019-10-02 PROCEDURE — 87486 CHLMYD PNEUM DNA AMP PROBE: CPT

## 2019-10-02 PROCEDURE — 82607 VITAMIN B-12: CPT

## 2019-10-02 PROCEDURE — 87450 HC DIRECT STREP B ANTIGEN: CPT

## 2019-10-02 PROCEDURE — 85025 COMPLETE CBC W/AUTO DIFF WBC: CPT

## 2019-10-02 PROCEDURE — 2580000003 HC RX 258: Performed by: INTERNAL MEDICINE

## 2019-10-02 PROCEDURE — 87088 URINE BACTERIA CULTURE: CPT

## 2019-10-02 PROCEDURE — 83605 ASSAY OF LACTIC ACID: CPT

## 2019-10-02 PROCEDURE — 96365 THER/PROPH/DIAG IV INF INIT: CPT

## 2019-10-02 PROCEDURE — 81001 URINALYSIS AUTO W/SCOPE: CPT

## 2019-10-02 PROCEDURE — 6360000002 HC RX W HCPCS: Performed by: EMERGENCY MEDICINE

## 2019-10-02 PROCEDURE — 6370000000 HC RX 637 (ALT 250 FOR IP): Performed by: INTERNAL MEDICINE

## 2019-10-02 PROCEDURE — 85378 FIBRIN DEGRADE SEMIQUANT: CPT

## 2019-10-02 PROCEDURE — 84484 ASSAY OF TROPONIN QUANT: CPT

## 2019-10-02 PROCEDURE — 36415 COLL VENOUS BLD VENIPUNCTURE: CPT

## 2019-10-02 PROCEDURE — 94664 DEMO&/EVAL PT USE INHALER: CPT

## 2019-10-02 RX ORDER — DIPHENHYDRAMINE HCL 25 MG
50 TABLET ORAL ONCE
Status: CANCELLED
Start: 2019-10-03

## 2019-10-02 RX ORDER — BUDESONIDE 0.25 MG/2ML
250 INHALANT ORAL 2 TIMES DAILY
Status: DISCONTINUED | OUTPATIENT
Start: 2019-10-02 | End: 2019-10-07 | Stop reason: HOSPADM

## 2019-10-02 RX ORDER — SODIUM CHLORIDE 9 MG/ML
INJECTION, SOLUTION INTRAVENOUS CONTINUOUS
Status: DISCONTINUED | OUTPATIENT
Start: 2019-10-02 | End: 2019-10-03

## 2019-10-02 RX ORDER — SODIUM CHLORIDE 0.9 % (FLUSH) 0.9 %
10 SYRINGE (ML) INJECTION PRN
Status: DISCONTINUED | OUTPATIENT
Start: 2019-10-02 | End: 2019-10-03 | Stop reason: HOSPADM

## 2019-10-02 RX ORDER — CLARITHROMYCIN 500 MG/1
500 TABLET, COATED ORAL 2 TIMES DAILY
Status: DISCONTINUED | OUTPATIENT
Start: 2019-10-02 | End: 2019-10-04

## 2019-10-02 RX ORDER — EPINEPHRINE 1 MG/ML
0.3 INJECTION, SOLUTION, CONCENTRATE INTRAVENOUS PRN
Status: CANCELLED
Start: 2019-10-03

## 2019-10-02 RX ORDER — PANTOPRAZOLE SODIUM 40 MG/1
40 TABLET, DELAYED RELEASE ORAL
Status: DISCONTINUED | OUTPATIENT
Start: 2019-10-03 | End: 2019-10-07 | Stop reason: HOSPADM

## 2019-10-02 RX ORDER — FORMOTEROL FUMARATE 20 UG/2ML
20 SOLUTION RESPIRATORY (INHALATION) 2 TIMES DAILY
Status: DISCONTINUED | OUTPATIENT
Start: 2019-10-02 | End: 2019-10-07 | Stop reason: HOSPADM

## 2019-10-02 RX ORDER — SODIUM CHLORIDE 0.9 % (FLUSH) 0.9 %
10 SYRINGE (ML) INJECTION PRN
Status: CANCELLED
Start: 2019-10-03

## 2019-10-02 RX ORDER — DIPHENHYDRAMINE HCL 25 MG
25 TABLET ORAL PRN
Status: CANCELLED
Start: 2019-10-03

## 2019-10-02 RX ORDER — FUROSEMIDE 10 MG/ML
20 INJECTION INTRAMUSCULAR; INTRAVENOUS ONCE
Status: COMPLETED | OUTPATIENT
Start: 2019-10-02 | End: 2019-10-02

## 2019-10-02 RX ORDER — ACETAMINOPHEN 325 MG/1
650 TABLET ORAL EVERY 4 HOURS PRN
Status: DISCONTINUED | OUTPATIENT
Start: 2019-10-02 | End: 2019-10-07 | Stop reason: HOSPADM

## 2019-10-02 RX ORDER — ETHAMBUTOL HYDROCHLORIDE 400 MG/1
400 TABLET, FILM COATED ORAL 3 TIMES DAILY
Status: DISCONTINUED | OUTPATIENT
Start: 2019-10-02 | End: 2019-10-04

## 2019-10-02 RX ORDER — DIPHENHYDRAMINE HYDROCHLORIDE 50 MG/ML
50 INJECTION INTRAMUSCULAR; INTRAVENOUS ONCE
Status: CANCELLED
Start: 2019-10-03

## 2019-10-02 RX ORDER — IBUPROFEN 600 MG/1
600 TABLET ORAL
Status: DISCONTINUED | OUTPATIENT
Start: 2019-10-03 | End: 2019-10-07 | Stop reason: HOSPADM

## 2019-10-02 RX ORDER — IPRATROPIUM BROMIDE AND ALBUTEROL SULFATE 2.5; .5 MG/3ML; MG/3ML
1 SOLUTION RESPIRATORY (INHALATION)
Status: DISCONTINUED | OUTPATIENT
Start: 2019-10-03 | End: 2019-10-03

## 2019-10-02 RX ADMIN — AMIKACIN SULFATE 1400 MG: 250 INJECTION, SOLUTION INTRAMUSCULAR; INTRAVENOUS at 07:33

## 2019-10-02 RX ADMIN — Medication 10 ML: at 08:34

## 2019-10-02 RX ADMIN — SODIUM CHLORIDE: 9 INJECTION, SOLUTION INTRAVENOUS at 23:04

## 2019-10-02 RX ADMIN — BUDESONIDE 250 MCG: 0.25 SUSPENSION RESPIRATORY (INHALATION) at 20:43

## 2019-10-02 RX ADMIN — Medication 10 ML: at 07:33

## 2019-10-02 RX ADMIN — Medication 300 UNITS: at 08:34

## 2019-10-02 RX ADMIN — ETHAMBUTOL HYDROCHLORIDE 400 MG: 400 TABLET ORAL at 23:00

## 2019-10-02 RX ADMIN — Medication 10 ML: at 08:33

## 2019-10-02 RX ADMIN — FUROSEMIDE 20 MG: 10 INJECTION, SOLUTION INTRAVENOUS at 19:43

## 2019-10-02 RX ADMIN — RIFAMPIN 300 MG: 300 CAPSULE ORAL at 23:00

## 2019-10-02 RX ADMIN — ENOXAPARIN SODIUM 40 MG: 40 INJECTION SUBCUTANEOUS at 23:04

## 2019-10-02 RX ADMIN — CLARITHROMYCIN 500 MG: 500 TABLET ORAL at 23:00

## 2019-10-02 RX ADMIN — FORMOTEROL FUMARATE DIHYDRATE 20 MCG: 20 SOLUTION RESPIRATORY (INHALATION) at 20:41

## 2019-10-02 ASSESSMENT — PAIN SCALES - GENERAL
PAINLEVEL_OUTOF10: 0
PAINLEVEL_OUTOF10: 0

## 2019-10-02 NOTE — PROGRESS NOTES
Called to Dr. Marylene Lark office spoke with Nuha Moreau patient verbalized today while he was here that he was feeling maybe a little more short of breath at times and Monday evening he felt like he had the chills he has had no fevers. Told her of all his trough levels and his dose decrease on 9/20/19 He does have follow up next week with Dr. Ellen Hughes.

## 2019-10-03 ENCOUNTER — HOSPITAL ENCOUNTER (OUTPATIENT)
Dept: INFUSION THERAPY | Age: 69
Setting detail: INFUSION SERIES
Discharge: HOME | End: 2019-10-03
Payer: MEDICARE

## 2019-10-03 ENCOUNTER — APPOINTMENT (OUTPATIENT)
Dept: CT IMAGING | Age: 69
DRG: 189 | End: 2019-10-03
Payer: MEDICARE

## 2019-10-03 PROBLEM — J90 PLEURAL EFFUSION ON RIGHT: Status: ACTIVE | Noted: 2019-10-03

## 2019-10-03 PROBLEM — J96.21 ACUTE AND CHRONIC RESPIRATORY FAILURE WITH HYPOXIA (HCC): Status: RESOLVED | Noted: 2017-10-12 | Resolved: 2019-10-03

## 2019-10-03 PROBLEM — A31.2: Status: ACTIVE | Noted: 2019-08-15

## 2019-10-03 LAB
ALBUMIN SERPL-MCNC: 3.9 G/DL (ref 3.5–5.2)
ALP BLD-CCNC: 56 U/L (ref 40–129)
ALT SERPL-CCNC: 8 U/L (ref 0–40)
AMMONIA: 21.6 UMOL/L (ref 16–60)
ANION GAP SERPL CALCULATED.3IONS-SCNC: 14 MMOL/L (ref 7–16)
AST SERPL-CCNC: 12 U/L (ref 0–39)
BASOPHILS ABSOLUTE: 0.03 E9/L (ref 0–0.2)
BASOPHILS RELATIVE PERCENT: 0.5 % (ref 0–2)
BILIRUB SERPL-MCNC: 0.4 MG/DL (ref 0–1.2)
BILIRUBIN DIRECT: <0.2 MG/DL (ref 0–0.3)
BILIRUBIN, INDIRECT: NORMAL MG/DL (ref 0–1)
BUN BLDV-MCNC: 14 MG/DL (ref 8–23)
CALCIUM SERPL-MCNC: 9.3 MG/DL (ref 8.6–10.2)
CHLORIDE BLD-SCNC: 100 MMOL/L (ref 98–107)
CHOLESTEROL, TOTAL: 149 MG/DL (ref 0–199)
CO2: 22 MMOL/L (ref 22–29)
CREAT SERPL-MCNC: 0.8 MG/DL (ref 0.7–1.2)
EKG ATRIAL RATE: 97 BPM
EKG P AXIS: 42 DEGREES
EKG P-R INTERVAL: 138 MS
EKG Q-T INTERVAL: 362 MS
EKG QRS DURATION: 92 MS
EKG QTC CALCULATION (BAZETT): 459 MS
EKG R AXIS: -22 DEGREES
EKG T AXIS: 52 DEGREES
EKG VENTRICULAR RATE: 97 BPM
EOSINOPHILS ABSOLUTE: 0.14 E9/L (ref 0.05–0.5)
EOSINOPHILS RELATIVE PERCENT: 2.5 % (ref 0–6)
FILM ARRAY ADENOVIRUS: NORMAL
FILM ARRAY BORDETELLA PERTUSSIS: NORMAL
FILM ARRAY CHLAMYDOPHILIA PNEUMONIAE: NORMAL
FILM ARRAY CORONAVIRUS 229E: NORMAL
FILM ARRAY CORONAVIRUS HKU1: NORMAL
FILM ARRAY CORONAVIRUS NL63: NORMAL
FILM ARRAY CORONAVIRUS OC43: NORMAL
FILM ARRAY INFLUENZA A VIRUS 09H1: NORMAL
FILM ARRAY INFLUENZA A VIRUS H1: NORMAL
FILM ARRAY INFLUENZA A VIRUS H3: NORMAL
FILM ARRAY INFLUENZA A VIRUS: NORMAL
FILM ARRAY INFLUENZA B: NORMAL
FILM ARRAY METAPNEUMOVIRUS: NORMAL
FILM ARRAY MYCOPLASMA PNEUMONIAE: NORMAL
FILM ARRAY PARAINFLUENZA VIRUS 1: NORMAL
FILM ARRAY PARAINFLUENZA VIRUS 2: NORMAL
FILM ARRAY PARAINFLUENZA VIRUS 3: NORMAL
FILM ARRAY PARAINFLUENZA VIRUS 4: NORMAL
FILM ARRAY RESPIRATORY SYNCITIAL VIRUS: NORMAL
FILM ARRAY RHINOVIRUS/ENTEROVIRUS: NORMAL
FOLATE: 8.7 NG/ML (ref 4.8–24.2)
GFR AFRICAN AMERICAN: >60
GFR NON-AFRICAN AMERICAN: >60 ML/MIN/1.73
GLUCOSE BLD-MCNC: 83 MG/DL (ref 74–99)
HBA1C MFR BLD: 5.6 % (ref 4–5.6)
HCT VFR BLD CALC: 45.8 % (ref 37–54)
HDLC SERPL-MCNC: 55 MG/DL
HEMOGLOBIN: 15 G/DL (ref 12.5–16.5)
IMMATURE GRANULOCYTES #: 0.01 E9/L
IMMATURE GRANULOCYTES %: 0.2 % (ref 0–5)
L. PNEUMOPHILA SEROGP 1 UR AG: NORMAL
LACTIC ACID, SEPSIS: 0.9 MMOL/L (ref 0.5–1.9)
LDL CHOLESTEROL CALCULATED: 75 MG/DL (ref 0–99)
LYMPHOCYTES ABSOLUTE: 1.4 E9/L (ref 1.5–4)
LYMPHOCYTES RELATIVE PERCENT: 24.8 % (ref 20–42)
MAGNESIUM: 1.8 MG/DL (ref 1.6–2.6)
MCH RBC QN AUTO: 28.7 PG (ref 26–35)
MCHC RBC AUTO-ENTMCNC: 32.8 % (ref 32–34.5)
MCV RBC AUTO: 87.6 FL (ref 80–99.9)
MONOCYTES ABSOLUTE: 0.61 E9/L (ref 0.1–0.95)
MONOCYTES RELATIVE PERCENT: 10.8 % (ref 2–12)
NEUTROPHILS ABSOLUTE: 3.45 E9/L (ref 1.8–7.3)
NEUTROPHILS RELATIVE PERCENT: 61.2 % (ref 43–80)
PDW BLD-RTO: 14.6 FL (ref 11.5–15)
PHOSPHORUS: 3.7 MG/DL (ref 2.5–4.5)
PLATELET # BLD: 196 E9/L (ref 130–450)
PMV BLD AUTO: 9.9 FL (ref 7–12)
POTASSIUM SERPL-SCNC: 3.6 MMOL/L (ref 3.5–5)
PRO-BNP: 169 PG/ML (ref 0–125)
PROCALCITONIN: 0.08 NG/ML (ref 0–0.08)
RBC # BLD: 5.23 E12/L (ref 3.8–5.8)
SODIUM BLD-SCNC: 136 MMOL/L (ref 132–146)
STREP PNEUMONIAE ANTIGEN, URINE: NORMAL
TOTAL PROTEIN: 6.5 G/DL (ref 6.4–8.3)
TRIGL SERPL-MCNC: 93 MG/DL (ref 0–149)
TSH SERPL DL<=0.05 MIU/L-ACNC: 3.81 UIU/ML (ref 0.27–4.2)
VITAMIN B-12: 345 PG/ML (ref 211–946)
VLDLC SERPL CALC-MCNC: 19 MG/DL
WBC # BLD: 5.6 E9/L (ref 4.5–11.5)

## 2019-10-03 PROCEDURE — 87070 CULTURE OTHR SPECIMN AEROBIC: CPT

## 2019-10-03 PROCEDURE — 36415 COLL VENOUS BLD VENIPUNCTURE: CPT

## 2019-10-03 PROCEDURE — 93010 ELECTROCARDIOGRAM REPORT: CPT | Performed by: INTERNAL MEDICINE

## 2019-10-03 PROCEDURE — 6370000000 HC RX 637 (ALT 250 FOR IP): Performed by: INTERNAL MEDICINE

## 2019-10-03 PROCEDURE — 71275 CT ANGIOGRAPHY CHEST: CPT

## 2019-10-03 PROCEDURE — 84100 ASSAY OF PHOSPHORUS: CPT

## 2019-10-03 PROCEDURE — 97165 OT EVAL LOW COMPLEX 30 MIN: CPT

## 2019-10-03 PROCEDURE — 82140 ASSAY OF AMMONIA: CPT

## 2019-10-03 PROCEDURE — 83880 ASSAY OF NATRIURETIC PEPTIDE: CPT

## 2019-10-03 PROCEDURE — 87205 SMEAR GRAM STAIN: CPT

## 2019-10-03 PROCEDURE — 94640 AIRWAY INHALATION TREATMENT: CPT

## 2019-10-03 PROCEDURE — 80048 BASIC METABOLIC PNL TOTAL CA: CPT

## 2019-10-03 PROCEDURE — 80061 LIPID PANEL: CPT

## 2019-10-03 PROCEDURE — 84443 ASSAY THYROID STIM HORMONE: CPT

## 2019-10-03 PROCEDURE — 80076 HEPATIC FUNCTION PANEL: CPT

## 2019-10-03 PROCEDURE — 97161 PT EVAL LOW COMPLEX 20 MIN: CPT

## 2019-10-03 PROCEDURE — 85025 COMPLETE CBC W/AUTO DIFF WBC: CPT

## 2019-10-03 PROCEDURE — 6360000002 HC RX W HCPCS: Performed by: INTERNAL MEDICINE

## 2019-10-03 PROCEDURE — 83735 ASSAY OF MAGNESIUM: CPT

## 2019-10-03 PROCEDURE — 2700000000 HC OXYGEN THERAPY PER DAY

## 2019-10-03 PROCEDURE — 83605 ASSAY OF LACTIC ACID: CPT

## 2019-10-03 PROCEDURE — 2580000003 HC RX 258: Performed by: INTERNAL MEDICINE

## 2019-10-03 PROCEDURE — 2580000003 HC RX 258: Performed by: RADIOLOGY

## 2019-10-03 PROCEDURE — 2060000000 HC ICU INTERMEDIATE R&B

## 2019-10-03 PROCEDURE — 87206 SMEAR FLUORESCENT/ACID STAI: CPT

## 2019-10-03 PROCEDURE — 6360000004 HC RX CONTRAST MEDICATION: Performed by: RADIOLOGY

## 2019-10-03 RX ORDER — IPRATROPIUM BROMIDE AND ALBUTEROL SULFATE 2.5; .5 MG/3ML; MG/3ML
1 SOLUTION RESPIRATORY (INHALATION) EVERY 4 HOURS
Status: DISCONTINUED | OUTPATIENT
Start: 2019-10-03 | End: 2019-10-07 | Stop reason: HOSPADM

## 2019-10-03 RX ORDER — SODIUM CHLORIDE 0.9 % (FLUSH) 0.9 %
10 SYRINGE (ML) INJECTION PRN
Status: DISCONTINUED | OUTPATIENT
Start: 2019-10-03 | End: 2019-10-07 | Stop reason: HOSPADM

## 2019-10-03 RX ADMIN — RIFAMPIN 300 MG: 300 CAPSULE ORAL at 08:27

## 2019-10-03 RX ADMIN — CLARITHROMYCIN 500 MG: 500 TABLET ORAL at 08:28

## 2019-10-03 RX ADMIN — ETHAMBUTOL HYDROCHLORIDE 400 MG: 400 TABLET ORAL at 08:28

## 2019-10-03 RX ADMIN — IBUPROFEN 600 MG: 600 TABLET, FILM COATED ORAL at 08:36

## 2019-10-03 RX ADMIN — BUDESONIDE 250 MCG: 0.25 SUSPENSION RESPIRATORY (INHALATION) at 20:27

## 2019-10-03 RX ADMIN — IOPAMIDOL 100 ML: 755 INJECTION, SOLUTION INTRAVENOUS at 12:00

## 2019-10-03 RX ADMIN — FORMOTEROL FUMARATE DIHYDRATE 20 MCG: 20 SOLUTION RESPIRATORY (INHALATION) at 20:27

## 2019-10-03 RX ADMIN — RIFAMPIN 300 MG: 300 CAPSULE ORAL at 21:40

## 2019-10-03 RX ADMIN — IPRATROPIUM BROMIDE AND ALBUTEROL SULFATE 1 AMPULE: .5; 3 SOLUTION RESPIRATORY (INHALATION) at 16:36

## 2019-10-03 RX ADMIN — IPRATROPIUM BROMIDE AND ALBUTEROL SULFATE 1 AMPULE: .5; 3 SOLUTION RESPIRATORY (INHALATION) at 20:27

## 2019-10-03 RX ADMIN — SODIUM CHLORIDE: 9 INJECTION, SOLUTION INTRAVENOUS at 09:09

## 2019-10-03 RX ADMIN — FORMOTEROL FUMARATE DIHYDRATE 20 MCG: 20 SOLUTION RESPIRATORY (INHALATION) at 08:07

## 2019-10-03 RX ADMIN — Medication 10 ML: at 12:00

## 2019-10-03 RX ADMIN — ETHAMBUTOL HYDROCHLORIDE 400 MG: 400 TABLET ORAL at 13:11

## 2019-10-03 RX ADMIN — BUDESONIDE 250 MCG: 0.25 SUSPENSION RESPIRATORY (INHALATION) at 08:07

## 2019-10-03 RX ADMIN — ETHAMBUTOL HYDROCHLORIDE 400 MG: 400 TABLET ORAL at 21:39

## 2019-10-03 RX ADMIN — CLARITHROMYCIN 500 MG: 500 TABLET ORAL at 21:39

## 2019-10-03 RX ADMIN — PANTOPRAZOLE SODIUM 40 MG: 40 TABLET, DELAYED RELEASE ORAL at 05:44

## 2019-10-03 RX ADMIN — IBUPROFEN 600 MG: 600 TABLET, FILM COATED ORAL at 13:12

## 2019-10-03 RX ADMIN — IPRATROPIUM BROMIDE AND ALBUTEROL SULFATE 1 AMPULE: .5; 3 SOLUTION RESPIRATORY (INHALATION) at 08:07

## 2019-10-03 RX ADMIN — ENOXAPARIN SODIUM 40 MG: 40 INJECTION SUBCUTANEOUS at 21:40

## 2019-10-03 RX ADMIN — IBUPROFEN 600 MG: 600 TABLET, FILM COATED ORAL at 19:45

## 2019-10-03 ASSESSMENT — PAIN SCALES - GENERAL
PAINLEVEL_OUTOF10: 0

## 2019-10-03 ASSESSMENT — ENCOUNTER SYMPTOMS
ABDOMINAL DISTENTION: 0
RHINORRHEA: 0
BLOOD IN STOOL: 0
BACK PAIN: 0
SHORTNESS OF BREATH: 1
VOMITING: 0
SORE THROAT: 0
EYE DISCHARGE: 0
ABDOMINAL PAIN: 0
NAUSEA: 0
WHEEZING: 1
CONSTIPATION: 0
COUGH: 1
SINUS PAIN: 0

## 2019-10-03 NOTE — PROGRESS NOTES
When came in this morning we had a script on the fax machine from Dr. Mohan Modi office that was faxed over yesterday after we had closed for the day, it was for a chest xray went to look up patients number to call him to instruct on coming to get a chest xray and noticed he was currently hospitalized.

## 2019-10-04 ENCOUNTER — HOSPITAL ENCOUNTER (OUTPATIENT)
Dept: INFUSION THERAPY | Age: 69
Setting detail: INFUSION SERIES
Discharge: HOME | End: 2019-10-04
Payer: MEDICARE

## 2019-10-04 LAB
ALBUMIN SERPL-MCNC: 3.6 G/DL (ref 3.5–5.2)
ALP BLD-CCNC: 55 U/L (ref 40–129)
ALT SERPL-CCNC: 7 U/L (ref 0–40)
ANION GAP SERPL CALCULATED.3IONS-SCNC: 10 MMOL/L (ref 7–16)
AST SERPL-CCNC: 11 U/L (ref 0–39)
BASOPHILS ABSOLUTE: 0.03 E9/L (ref 0–0.2)
BASOPHILS RELATIVE PERCENT: 0.6 % (ref 0–2)
BILIRUB SERPL-MCNC: 0.2 MG/DL (ref 0–1.2)
BILIRUBIN DIRECT: <0.2 MG/DL (ref 0–0.3)
BILIRUBIN, INDIRECT: ABNORMAL MG/DL (ref 0–1)
BUN BLDV-MCNC: 12 MG/DL (ref 8–23)
CALCIUM SERPL-MCNC: 9.3 MG/DL (ref 8.6–10.2)
CHLORIDE BLD-SCNC: 106 MMOL/L (ref 98–107)
CO2: 23 MMOL/L (ref 22–29)
CREAT SERPL-MCNC: 0.7 MG/DL (ref 0.7–1.2)
EOSINOPHILS ABSOLUTE: 0.14 E9/L (ref 0.05–0.5)
EOSINOPHILS RELATIVE PERCENT: 2.6 % (ref 0–6)
GFR AFRICAN AMERICAN: >60
GFR NON-AFRICAN AMERICAN: >60 ML/MIN/1.73
GLUCOSE BLD-MCNC: 108 MG/DL (ref 74–99)
GRAM STAIN ORDERABLE: NORMAL
HCT VFR BLD CALC: 40.3 % (ref 37–54)
HEMOGLOBIN: 13.5 G/DL (ref 12.5–16.5)
IMMATURE GRANULOCYTES #: 0.01 E9/L
IMMATURE GRANULOCYTES %: 0.2 % (ref 0–5)
LYMPHOCYTES ABSOLUTE: 1.13 E9/L (ref 1.5–4)
LYMPHOCYTES RELATIVE PERCENT: 21.2 % (ref 20–42)
MAGNESIUM: 1.8 MG/DL (ref 1.6–2.6)
MCH RBC QN AUTO: 29.8 PG (ref 26–35)
MCHC RBC AUTO-ENTMCNC: 33.5 % (ref 32–34.5)
MCV RBC AUTO: 89 FL (ref 80–99.9)
MONOCYTES ABSOLUTE: 0.48 E9/L (ref 0.1–0.95)
MONOCYTES RELATIVE PERCENT: 9 % (ref 2–12)
NEUTROPHILS ABSOLUTE: 3.54 E9/L (ref 1.8–7.3)
NEUTROPHILS RELATIVE PERCENT: 66.4 % (ref 43–80)
PDW BLD-RTO: 14.4 FL (ref 11.5–15)
PHOSPHORUS: 3.4 MG/DL (ref 2.5–4.5)
PLATELET # BLD: 156 E9/L (ref 130–450)
PMV BLD AUTO: 9.9 FL (ref 7–12)
POTASSIUM SERPL-SCNC: 4 MMOL/L (ref 3.5–5)
RBC # BLD: 4.53 E12/L (ref 3.8–5.8)
SODIUM BLD-SCNC: 139 MMOL/L (ref 132–146)
TOTAL PROTEIN: 6 G/DL (ref 6.4–8.3)
WBC # BLD: 5.3 E9/L (ref 4.5–11.5)

## 2019-10-04 PROCEDURE — 6370000000 HC RX 637 (ALT 250 FOR IP): Performed by: INTERNAL MEDICINE

## 2019-10-04 PROCEDURE — 2060000000 HC ICU INTERMEDIATE R&B

## 2019-10-04 PROCEDURE — 94667 MNPJ CHEST WALL 1ST: CPT

## 2019-10-04 PROCEDURE — 85025 COMPLETE CBC W/AUTO DIFF WBC: CPT

## 2019-10-04 PROCEDURE — 6360000002 HC RX W HCPCS: Performed by: INTERNAL MEDICINE

## 2019-10-04 PROCEDURE — 80076 HEPATIC FUNCTION PANEL: CPT

## 2019-10-04 PROCEDURE — 83735 ASSAY OF MAGNESIUM: CPT

## 2019-10-04 PROCEDURE — 94640 AIRWAY INHALATION TREATMENT: CPT

## 2019-10-04 PROCEDURE — 2700000000 HC OXYGEN THERAPY PER DAY

## 2019-10-04 PROCEDURE — 36415 COLL VENOUS BLD VENIPUNCTURE: CPT

## 2019-10-04 PROCEDURE — 80048 BASIC METABOLIC PNL TOTAL CA: CPT

## 2019-10-04 PROCEDURE — 84100 ASSAY OF PHOSPHORUS: CPT

## 2019-10-04 RX ORDER — ETHAMBUTOL HYDROCHLORIDE 400 MG/1
400 TABLET, FILM COATED ORAL
Status: DISCONTINUED | OUTPATIENT
Start: 2019-10-07 | End: 2019-10-07 | Stop reason: HOSPADM

## 2019-10-04 RX ORDER — CLARITHROMYCIN 500 MG/1
500 TABLET, COATED ORAL
Status: DISCONTINUED | OUTPATIENT
Start: 2019-10-07 | End: 2019-10-07 | Stop reason: HOSPADM

## 2019-10-04 RX ADMIN — FORMOTEROL FUMARATE DIHYDRATE 20 MCG: 20 SOLUTION RESPIRATORY (INHALATION) at 21:57

## 2019-10-04 RX ADMIN — RIFAMPIN 300 MG: 300 CAPSULE ORAL at 08:41

## 2019-10-04 RX ADMIN — CLARITHROMYCIN 500 MG: 500 TABLET ORAL at 08:38

## 2019-10-04 RX ADMIN — PANTOPRAZOLE SODIUM 40 MG: 40 TABLET, DELAYED RELEASE ORAL at 06:30

## 2019-10-04 RX ADMIN — IBUPROFEN 600 MG: 600 TABLET, FILM COATED ORAL at 11:05

## 2019-10-04 RX ADMIN — IBUPROFEN 600 MG: 600 TABLET, FILM COATED ORAL at 17:55

## 2019-10-04 RX ADMIN — IPRATROPIUM BROMIDE AND ALBUTEROL SULFATE 1 AMPULE: .5; 3 SOLUTION RESPIRATORY (INHALATION) at 12:07

## 2019-10-04 RX ADMIN — IPRATROPIUM BROMIDE AND ALBUTEROL SULFATE 1 AMPULE: .5; 3 SOLUTION RESPIRATORY (INHALATION) at 17:12

## 2019-10-04 RX ADMIN — IBUPROFEN 600 MG: 600 TABLET, FILM COATED ORAL at 08:37

## 2019-10-04 RX ADMIN — FORMOTEROL FUMARATE DIHYDRATE 20 MCG: 20 SOLUTION RESPIRATORY (INHALATION) at 08:48

## 2019-10-04 RX ADMIN — ENOXAPARIN SODIUM 40 MG: 40 INJECTION SUBCUTANEOUS at 20:16

## 2019-10-04 RX ADMIN — IPRATROPIUM BROMIDE AND ALBUTEROL SULFATE 1 AMPULE: .5; 3 SOLUTION RESPIRATORY (INHALATION) at 05:02

## 2019-10-04 RX ADMIN — BUDESONIDE 250 MCG: 0.25 SUSPENSION RESPIRATORY (INHALATION) at 21:57

## 2019-10-04 RX ADMIN — ETHAMBUTOL HYDROCHLORIDE 400 MG: 400 TABLET ORAL at 08:41

## 2019-10-04 RX ADMIN — BUDESONIDE 250 MCG: 0.25 SUSPENSION RESPIRATORY (INHALATION) at 08:48

## 2019-10-04 RX ADMIN — IPRATROPIUM BROMIDE AND ALBUTEROL SULFATE 1 AMPULE: .5; 3 SOLUTION RESPIRATORY (INHALATION) at 08:48

## 2019-10-04 RX ADMIN — IPRATROPIUM BROMIDE AND ALBUTEROL SULFATE 1 AMPULE: .5; 3 SOLUTION RESPIRATORY (INHALATION) at 21:57

## 2019-10-04 RX ADMIN — IPRATROPIUM BROMIDE AND ALBUTEROL SULFATE 1 AMPULE: .5; 3 SOLUTION RESPIRATORY (INHALATION) at 01:14

## 2019-10-04 ASSESSMENT — PAIN SCALES - GENERAL
PAINLEVEL_OUTOF10: 0

## 2019-10-05 ENCOUNTER — APPOINTMENT (OUTPATIENT)
Dept: CT IMAGING | Age: 69
DRG: 189 | End: 2019-10-05
Payer: MEDICARE

## 2019-10-05 LAB
ALBUMIN SERPL-MCNC: 3.7 G/DL (ref 3.5–5.2)
ALP BLD-CCNC: 54 U/L (ref 40–129)
ALT SERPL-CCNC: 7 U/L (ref 0–40)
ANION GAP SERPL CALCULATED.3IONS-SCNC: 11 MMOL/L (ref 7–16)
AST SERPL-CCNC: 11 U/L (ref 0–39)
BASOPHILS ABSOLUTE: 0.03 E9/L (ref 0–0.2)
BASOPHILS RELATIVE PERCENT: 0.4 % (ref 0–2)
BILIRUB SERPL-MCNC: 0.2 MG/DL (ref 0–1.2)
BILIRUBIN DIRECT: <0.2 MG/DL (ref 0–0.3)
BILIRUBIN, INDIRECT: ABNORMAL MG/DL (ref 0–1)
BUN BLDV-MCNC: 8 MG/DL (ref 8–23)
CALCIUM SERPL-MCNC: 9.5 MG/DL (ref 8.6–10.2)
CHLORIDE BLD-SCNC: 102 MMOL/L (ref 98–107)
CO2: 23 MMOL/L (ref 22–29)
CREAT SERPL-MCNC: 0.7 MG/DL (ref 0.7–1.2)
CULTURE, RESPIRATORY: NORMAL
EOSINOPHILS ABSOLUTE: 0.27 E9/L (ref 0.05–0.5)
EOSINOPHILS RELATIVE PERCENT: 3.3 % (ref 0–6)
GFR AFRICAN AMERICAN: >60
GFR NON-AFRICAN AMERICAN: >60 ML/MIN/1.73
GLUCOSE BLD-MCNC: 101 MG/DL (ref 74–99)
HCT VFR BLD CALC: 41.7 % (ref 37–54)
HEMOGLOBIN: 13.9 G/DL (ref 12.5–16.5)
IMMATURE GRANULOCYTES #: 0.02 E9/L
IMMATURE GRANULOCYTES %: 0.2 % (ref 0–5)
LYMPHOCYTES ABSOLUTE: 0.77 E9/L (ref 1.5–4)
LYMPHOCYTES RELATIVE PERCENT: 9.3 % (ref 20–42)
MAGNESIUM: 1.7 MG/DL (ref 1.6–2.6)
MCH RBC QN AUTO: 29.6 PG (ref 26–35)
MCHC RBC AUTO-ENTMCNC: 33.3 % (ref 32–34.5)
MCV RBC AUTO: 88.9 FL (ref 80–99.9)
MONOCYTES ABSOLUTE: 0.56 E9/L (ref 0.1–0.95)
MONOCYTES RELATIVE PERCENT: 6.8 % (ref 2–12)
NEUTROPHILS ABSOLUTE: 6.59 E9/L (ref 1.8–7.3)
NEUTROPHILS RELATIVE PERCENT: 80 % (ref 43–80)
PDW BLD-RTO: 14.9 FL (ref 11.5–15)
PHOSPHORUS: 3 MG/DL (ref 2.5–4.5)
PLATELET # BLD: 171 E9/L (ref 130–450)
PMV BLD AUTO: 9.4 FL (ref 7–12)
POTASSIUM SERPL-SCNC: 4.3 MMOL/L (ref 3.5–5)
RBC # BLD: 4.69 E12/L (ref 3.8–5.8)
SMEAR, RESPIRATORY: NORMAL
SODIUM BLD-SCNC: 136 MMOL/L (ref 132–146)
TOTAL PROTEIN: 6.3 G/DL (ref 6.4–8.3)
URINE CULTURE, ROUTINE: NORMAL
WBC # BLD: 8.2 E9/L (ref 4.5–11.5)

## 2019-10-05 PROCEDURE — 6370000000 HC RX 637 (ALT 250 FOR IP): Performed by: INTERNAL MEDICINE

## 2019-10-05 PROCEDURE — 84100 ASSAY OF PHOSPHORUS: CPT

## 2019-10-05 PROCEDURE — 2060000000 HC ICU INTERMEDIATE R&B

## 2019-10-05 PROCEDURE — 80076 HEPATIC FUNCTION PANEL: CPT

## 2019-10-05 PROCEDURE — 2580000003 HC RX 258: Performed by: RADIOLOGY

## 2019-10-05 PROCEDURE — 6360000002 HC RX W HCPCS: Performed by: INTERNAL MEDICINE

## 2019-10-05 PROCEDURE — 94668 MNPJ CHEST WALL SBSQ: CPT

## 2019-10-05 PROCEDURE — 36415 COLL VENOUS BLD VENIPUNCTURE: CPT

## 2019-10-05 PROCEDURE — 85025 COMPLETE CBC W/AUTO DIFF WBC: CPT

## 2019-10-05 PROCEDURE — 80048 BASIC METABOLIC PNL TOTAL CA: CPT

## 2019-10-05 PROCEDURE — 83735 ASSAY OF MAGNESIUM: CPT

## 2019-10-05 PROCEDURE — 94640 AIRWAY INHALATION TREATMENT: CPT

## 2019-10-05 PROCEDURE — 2700000000 HC OXYGEN THERAPY PER DAY

## 2019-10-05 RX ORDER — ACYCLOVIR 50 MG/G
OINTMENT TOPICAL
Status: DISCONTINUED | OUTPATIENT
Start: 2019-10-05 | End: 2019-10-05 | Stop reason: RX

## 2019-10-05 RX ORDER — CLOTRIMAZOLE AND BETAMETHASONE DIPROPIONATE 10; .5 MG/ML; MG/ML
LOTION TOPICAL 2 TIMES DAILY
Status: DISCONTINUED | OUTPATIENT
Start: 2019-10-05 | End: 2019-10-07 | Stop reason: HOSPADM

## 2019-10-05 RX ORDER — ACYCLOVIR 200 MG/1
200 CAPSULE ORAL
Status: DISCONTINUED | OUTPATIENT
Start: 2019-10-05 | End: 2019-10-07 | Stop reason: HOSPADM

## 2019-10-05 RX ADMIN — FORMOTEROL FUMARATE DIHYDRATE 20 MCG: 20 SOLUTION RESPIRATORY (INHALATION) at 08:24

## 2019-10-05 RX ADMIN — BUDESONIDE 250 MCG: 0.25 SUSPENSION RESPIRATORY (INHALATION) at 21:26

## 2019-10-05 RX ADMIN — IBUPROFEN 600 MG: 600 TABLET, FILM COATED ORAL at 12:01

## 2019-10-05 RX ADMIN — PANTOPRAZOLE SODIUM 40 MG: 40 TABLET, DELAYED RELEASE ORAL at 06:19

## 2019-10-05 RX ADMIN — FORMOTEROL FUMARATE DIHYDRATE 20 MCG: 20 SOLUTION RESPIRATORY (INHALATION) at 21:26

## 2019-10-05 RX ADMIN — IBUPROFEN 600 MG: 600 TABLET, FILM COATED ORAL at 17:12

## 2019-10-05 RX ADMIN — BUDESONIDE 250 MCG: 0.25 SUSPENSION RESPIRATORY (INHALATION) at 08:25

## 2019-10-05 RX ADMIN — IPRATROPIUM BROMIDE AND ALBUTEROL SULFATE 1 AMPULE: .5; 3 SOLUTION RESPIRATORY (INHALATION) at 21:26

## 2019-10-05 RX ADMIN — ENOXAPARIN SODIUM 40 MG: 40 INJECTION SUBCUTANEOUS at 20:11

## 2019-10-05 RX ADMIN — ACYCLOVIR 200 MG: 200 CAPSULE ORAL at 15:00

## 2019-10-05 RX ADMIN — CLOTRIMAZOLE AND BETAMETHASONE DIPROPIONATE: 10; .5 LOTION TOPICAL at 15:39

## 2019-10-05 RX ADMIN — Medication 10 ML: at 20:11

## 2019-10-05 RX ADMIN — ACYCLOVIR 200 MG: 200 CAPSULE ORAL at 22:52

## 2019-10-05 RX ADMIN — IPRATROPIUM BROMIDE AND ALBUTEROL SULFATE 1 AMPULE: .5; 3 SOLUTION RESPIRATORY (INHALATION) at 12:04

## 2019-10-05 RX ADMIN — IBUPROFEN 600 MG: 600 TABLET, FILM COATED ORAL at 08:21

## 2019-10-05 RX ADMIN — ACYCLOVIR 200 MG: 200 CAPSULE ORAL at 20:14

## 2019-10-05 RX ADMIN — CLOTRIMAZOLE AND BETAMETHASONE DIPROPIONATE: 10; .5 LOTION TOPICAL at 20:12

## 2019-10-05 RX ADMIN — IPRATROPIUM BROMIDE AND ALBUTEROL SULFATE 1 AMPULE: .5; 3 SOLUTION RESPIRATORY (INHALATION) at 16:33

## 2019-10-05 RX ADMIN — IPRATROPIUM BROMIDE AND ALBUTEROL SULFATE 1 AMPULE: .5; 3 SOLUTION RESPIRATORY (INHALATION) at 03:17

## 2019-10-05 RX ADMIN — IPRATROPIUM BROMIDE AND ALBUTEROL SULFATE 1 AMPULE: .5; 3 SOLUTION RESPIRATORY (INHALATION) at 08:25

## 2019-10-05 ASSESSMENT — PAIN SCALES - GENERAL
PAINLEVEL_OUTOF10: 0

## 2019-10-06 ENCOUNTER — ANESTHESIA EVENT (OUTPATIENT)
Dept: ENDOSCOPY | Age: 69
DRG: 189 | End: 2019-10-06
Payer: MEDICARE

## 2019-10-06 ENCOUNTER — APPOINTMENT (OUTPATIENT)
Dept: CT IMAGING | Age: 69
DRG: 189 | End: 2019-10-06
Payer: MEDICARE

## 2019-10-06 ENCOUNTER — ANESTHESIA (OUTPATIENT)
Dept: ENDOSCOPY | Age: 69
DRG: 189 | End: 2019-10-06
Payer: MEDICARE

## 2019-10-06 LAB
ALBUMIN SERPL-MCNC: 3.8 G/DL (ref 3.5–5.2)
ALP BLD-CCNC: 58 U/L (ref 40–129)
ALT SERPL-CCNC: 8 U/L (ref 0–40)
ANION GAP SERPL CALCULATED.3IONS-SCNC: 12 MMOL/L (ref 7–16)
AST SERPL-CCNC: 13 U/L (ref 0–39)
BASOPHILS ABSOLUTE: 0.03 E9/L (ref 0–0.2)
BASOPHILS RELATIVE PERCENT: 0.5 % (ref 0–2)
BILIRUB SERPL-MCNC: <0.2 MG/DL (ref 0–1.2)
BILIRUBIN DIRECT: <0.2 MG/DL (ref 0–0.3)
BILIRUBIN, INDIRECT: NORMAL MG/DL (ref 0–1)
BUN BLDV-MCNC: 8 MG/DL (ref 8–23)
C-REACTIVE PROTEIN: 2.1 MG/DL (ref 0–0.4)
CALCIUM SERPL-MCNC: 9.8 MG/DL (ref 8.6–10.2)
CHLORIDE BLD-SCNC: 102 MMOL/L (ref 98–107)
CO2: 24 MMOL/L (ref 22–29)
CREAT SERPL-MCNC: 0.7 MG/DL (ref 0.7–1.2)
EOSINOPHILS ABSOLUTE: 0.31 E9/L (ref 0.05–0.5)
EOSINOPHILS RELATIVE PERCENT: 5.5 % (ref 0–6)
GFR AFRICAN AMERICAN: >60
GFR NON-AFRICAN AMERICAN: >60 ML/MIN/1.73
GLUCOSE BLD-MCNC: 89 MG/DL (ref 74–99)
HCT VFR BLD CALC: 43.2 % (ref 37–54)
HEMOGLOBIN: 14.4 G/DL (ref 12.5–16.5)
IMMATURE GRANULOCYTES #: 0.02 E9/L
IMMATURE GRANULOCYTES %: 0.4 % (ref 0–5)
LYMPHOCYTES ABSOLUTE: 0.99 E9/L (ref 1.5–4)
LYMPHOCYTES RELATIVE PERCENT: 17.7 % (ref 20–42)
MAGNESIUM: 1.9 MG/DL (ref 1.6–2.6)
MCH RBC QN AUTO: 29.7 PG (ref 26–35)
MCHC RBC AUTO-ENTMCNC: 33.3 % (ref 32–34.5)
MCV RBC AUTO: 89.1 FL (ref 80–99.9)
MONOCYTES ABSOLUTE: 0.41 E9/L (ref 0.1–0.95)
MONOCYTES RELATIVE PERCENT: 7.3 % (ref 2–12)
NEUTROPHILS ABSOLUTE: 3.84 E9/L (ref 1.8–7.3)
NEUTROPHILS RELATIVE PERCENT: 68.6 % (ref 43–80)
PDW BLD-RTO: 14.6 FL (ref 11.5–15)
PHOSPHORUS: 3.4 MG/DL (ref 2.5–4.5)
PLATELET # BLD: 172 E9/L (ref 130–450)
PMV BLD AUTO: 9.5 FL (ref 7–12)
POTASSIUM SERPL-SCNC: 4.2 MMOL/L (ref 3.5–5)
PROCALCITONIN: 0.04 NG/ML (ref 0–0.08)
RBC # BLD: 4.85 E12/L (ref 3.8–5.8)
SODIUM BLD-SCNC: 138 MMOL/L (ref 132–146)
TOTAL PROTEIN: 6.6 G/DL (ref 6.4–8.3)
WBC # BLD: 5.6 E9/L (ref 4.5–11.5)

## 2019-10-06 PROCEDURE — 6370000000 HC RX 637 (ALT 250 FOR IP): Performed by: INTERNAL MEDICINE

## 2019-10-06 PROCEDURE — 6360000002 HC RX W HCPCS: Performed by: INTERNAL MEDICINE

## 2019-10-06 PROCEDURE — 71250 CT THORAX DX C-: CPT

## 2019-10-06 PROCEDURE — 85025 COMPLETE CBC W/AUTO DIFF WBC: CPT

## 2019-10-06 PROCEDURE — 84100 ASSAY OF PHOSPHORUS: CPT

## 2019-10-06 PROCEDURE — 2060000000 HC ICU INTERMEDIATE R&B

## 2019-10-06 PROCEDURE — 2580000003 HC RX 258: Performed by: RADIOLOGY

## 2019-10-06 PROCEDURE — 83735 ASSAY OF MAGNESIUM: CPT

## 2019-10-06 PROCEDURE — 36415 COLL VENOUS BLD VENIPUNCTURE: CPT

## 2019-10-06 PROCEDURE — 80076 HEPATIC FUNCTION PANEL: CPT

## 2019-10-06 PROCEDURE — 84145 PROCALCITONIN (PCT): CPT

## 2019-10-06 PROCEDURE — 80048 BASIC METABOLIC PNL TOTAL CA: CPT

## 2019-10-06 PROCEDURE — 36592 COLLECT BLOOD FROM PICC: CPT

## 2019-10-06 PROCEDURE — 94640 AIRWAY INHALATION TREATMENT: CPT

## 2019-10-06 PROCEDURE — 2700000000 HC OXYGEN THERAPY PER DAY

## 2019-10-06 PROCEDURE — 86140 C-REACTIVE PROTEIN: CPT

## 2019-10-06 PROCEDURE — 94668 MNPJ CHEST WALL SBSQ: CPT

## 2019-10-06 RX ADMIN — ACYCLOVIR 200 MG: 200 CAPSULE ORAL at 18:42

## 2019-10-06 RX ADMIN — IBUPROFEN 600 MG: 600 TABLET, FILM COATED ORAL at 18:02

## 2019-10-06 RX ADMIN — BUDESONIDE 250 MCG: 0.25 SUSPENSION RESPIRATORY (INHALATION) at 08:19

## 2019-10-06 RX ADMIN — ACYCLOVIR 200 MG: 200 CAPSULE ORAL at 11:26

## 2019-10-06 RX ADMIN — IBUPROFEN 600 MG: 600 TABLET, FILM COATED ORAL at 12:12

## 2019-10-06 RX ADMIN — IPRATROPIUM BROMIDE AND ALBUTEROL SULFATE 1 AMPULE: .5; 3 SOLUTION RESPIRATORY (INHALATION) at 22:07

## 2019-10-06 RX ADMIN — IPRATROPIUM BROMIDE AND ALBUTEROL SULFATE 1 AMPULE: .5; 3 SOLUTION RESPIRATORY (INHALATION) at 00:53

## 2019-10-06 RX ADMIN — CLOTRIMAZOLE AND BETAMETHASONE DIPROPIONATE: 10; .5 LOTION TOPICAL at 21:40

## 2019-10-06 RX ADMIN — FORMOTEROL FUMARATE DIHYDRATE 20 MCG: 20 SOLUTION RESPIRATORY (INHALATION) at 22:07

## 2019-10-06 RX ADMIN — PANTOPRAZOLE SODIUM 40 MG: 40 TABLET, DELAYED RELEASE ORAL at 06:20

## 2019-10-06 RX ADMIN — IPRATROPIUM BROMIDE AND ALBUTEROL SULFATE 1 AMPULE: .5; 3 SOLUTION RESPIRATORY (INHALATION) at 11:48

## 2019-10-06 RX ADMIN — IPRATROPIUM BROMIDE AND ALBUTEROL SULFATE 1 AMPULE: .5; 3 SOLUTION RESPIRATORY (INHALATION) at 15:26

## 2019-10-06 RX ADMIN — Medication 10 ML: at 08:04

## 2019-10-06 RX ADMIN — ACYCLOVIR 200 MG: 200 CAPSULE ORAL at 23:27

## 2019-10-06 RX ADMIN — IPRATROPIUM BROMIDE AND ALBUTEROL SULFATE 1 AMPULE: .5; 3 SOLUTION RESPIRATORY (INHALATION) at 08:19

## 2019-10-06 RX ADMIN — IBUPROFEN 600 MG: 600 TABLET, FILM COATED ORAL at 08:00

## 2019-10-06 RX ADMIN — ACYCLOVIR 200 MG: 200 CAPSULE ORAL at 06:19

## 2019-10-06 RX ADMIN — ACYCLOVIR 200 MG: 200 CAPSULE ORAL at 15:41

## 2019-10-06 RX ADMIN — BUDESONIDE 250 MCG: 0.25 SUSPENSION RESPIRATORY (INHALATION) at 22:08

## 2019-10-06 RX ADMIN — FORMOTEROL FUMARATE DIHYDRATE 20 MCG: 20 SOLUTION RESPIRATORY (INHALATION) at 08:19

## 2019-10-06 ASSESSMENT — PAIN SCALES - GENERAL
PAINLEVEL_OUTOF10: 0

## 2019-10-07 VITALS — OXYGEN SATURATION: 94 % | SYSTOLIC BLOOD PRESSURE: 105 MMHG | DIASTOLIC BLOOD PRESSURE: 58 MMHG

## 2019-10-07 VITALS
OXYGEN SATURATION: 90 % | DIASTOLIC BLOOD PRESSURE: 66 MMHG | BODY MASS INDEX: 27.25 KG/M2 | RESPIRATION RATE: 18 BRPM | SYSTOLIC BLOOD PRESSURE: 98 MMHG | TEMPERATURE: 97.1 F | HEIGHT: 72 IN | WEIGHT: 201.2 LBS | HEART RATE: 102 BPM

## 2019-10-07 LAB
ALBUMIN SERPL-MCNC: 3.8 G/DL (ref 3.5–5.2)
ALP BLD-CCNC: 56 U/L (ref 40–129)
ALT SERPL-CCNC: 10 U/L (ref 0–40)
ANION GAP SERPL CALCULATED.3IONS-SCNC: 8 MMOL/L (ref 7–16)
AST SERPL-CCNC: 15 U/L (ref 0–39)
BASOPHILS ABSOLUTE: 0.04 E9/L (ref 0–0.2)
BASOPHILS RELATIVE PERCENT: 0.7 % (ref 0–2)
BILIRUB SERPL-MCNC: <0.2 MG/DL (ref 0–1.2)
BILIRUBIN DIRECT: <0.2 MG/DL (ref 0–0.3)
BILIRUBIN, INDIRECT: ABNORMAL MG/DL (ref 0–1)
BUN BLDV-MCNC: 12 MG/DL (ref 8–23)
CALCIUM SERPL-MCNC: 9.7 MG/DL (ref 8.6–10.2)
CHLORIDE BLD-SCNC: 105 MMOL/L (ref 98–107)
CO2: 26 MMOL/L (ref 22–29)
CREAT SERPL-MCNC: 0.8 MG/DL (ref 0.7–1.2)
EOSINOPHILS ABSOLUTE: 0.3 E9/L (ref 0.05–0.5)
EOSINOPHILS RELATIVE PERCENT: 5 % (ref 0–6)
GFR AFRICAN AMERICAN: >60
GFR NON-AFRICAN AMERICAN: >60 ML/MIN/1.73
GLUCOSE BLD-MCNC: 93 MG/DL (ref 74–99)
HCT VFR BLD CALC: 43.2 % (ref 37–54)
HEMOGLOBIN: 14.2 G/DL (ref 12.5–16.5)
IMMATURE GRANULOCYTES #: 0.02 E9/L
IMMATURE GRANULOCYTES %: 0.3 % (ref 0–5)
LYMPHOCYTES ABSOLUTE: 1.23 E9/L (ref 1.5–4)
LYMPHOCYTES RELATIVE PERCENT: 20.3 % (ref 20–42)
MAGNESIUM: 2 MG/DL (ref 1.6–2.6)
MCH RBC QN AUTO: 29.3 PG (ref 26–35)
MCHC RBC AUTO-ENTMCNC: 32.9 % (ref 32–34.5)
MCV RBC AUTO: 89.3 FL (ref 80–99.9)
MONOCYTES ABSOLUTE: 0.45 E9/L (ref 0.1–0.95)
MONOCYTES RELATIVE PERCENT: 7.4 % (ref 2–12)
NEUTROPHILS ABSOLUTE: 4.01 E9/L (ref 1.8–7.3)
NEUTROPHILS RELATIVE PERCENT: 66.3 % (ref 43–80)
PDW BLD-RTO: 14.4 FL (ref 11.5–15)
PHOSPHORUS: 3.5 MG/DL (ref 2.5–4.5)
PLATELET # BLD: 178 E9/L (ref 130–450)
PMV BLD AUTO: 9.6 FL (ref 7–12)
POTASSIUM SERPL-SCNC: 4.5 MMOL/L (ref 3.5–5)
RBC # BLD: 4.84 E12/L (ref 3.8–5.8)
SODIUM BLD-SCNC: 139 MMOL/L (ref 132–146)
TOTAL PROTEIN: 6.3 G/DL (ref 6.4–8.3)
WBC # BLD: 6.1 E9/L (ref 4.5–11.5)

## 2019-10-07 PROCEDURE — 87070 CULTURE OTHR SPECIMN AEROBIC: CPT

## 2019-10-07 PROCEDURE — 87205 SMEAR GRAM STAIN: CPT

## 2019-10-07 PROCEDURE — 6370000000 HC RX 637 (ALT 250 FOR IP): Performed by: INTERNAL MEDICINE

## 2019-10-07 PROCEDURE — 3700000001 HC ADD 15 MINUTES (ANESTHESIA): Performed by: INTERNAL MEDICINE

## 2019-10-07 PROCEDURE — 83735 ASSAY OF MAGNESIUM: CPT

## 2019-10-07 PROCEDURE — 87206 SMEAR FLUORESCENT/ACID STAI: CPT

## 2019-10-07 PROCEDURE — 80076 HEPATIC FUNCTION PANEL: CPT

## 2019-10-07 PROCEDURE — 87102 FUNGUS ISOLATION CULTURE: CPT

## 2019-10-07 PROCEDURE — 6360000002 HC RX W HCPCS: Performed by: NURSE ANESTHETIST, CERTIFIED REGISTERED

## 2019-10-07 PROCEDURE — 87116 MYCOBACTERIA CULTURE: CPT

## 2019-10-07 PROCEDURE — 80048 BASIC METABOLIC PNL TOTAL CA: CPT

## 2019-10-07 PROCEDURE — 2709999900 HC NON-CHARGEABLE SUPPLY: Performed by: INTERNAL MEDICINE

## 2019-10-07 PROCEDURE — 2580000003 HC RX 258: Performed by: NURSE ANESTHETIST, CERTIFIED REGISTERED

## 2019-10-07 PROCEDURE — 2700000000 HC OXYGEN THERAPY PER DAY

## 2019-10-07 PROCEDURE — 7100000011 HC PHASE II RECOVERY - ADDTL 15 MIN: Performed by: INTERNAL MEDICINE

## 2019-10-07 PROCEDURE — 84100 ASSAY OF PHOSPHORUS: CPT

## 2019-10-07 PROCEDURE — 94640 AIRWAY INHALATION TREATMENT: CPT

## 2019-10-07 PROCEDURE — 3700000000 HC ANESTHESIA ATTENDED CARE: Performed by: INTERNAL MEDICINE

## 2019-10-07 PROCEDURE — 3609011500 HC BRONCHOSCOPY DIAGNOSTIC OR CELL WASH ONLY: Performed by: INTERNAL MEDICINE

## 2019-10-07 PROCEDURE — 0BCB8ZZ EXTIRPATION OF MATTER FROM LEFT LOWER LOBE BRONCHUS, VIA NATURAL OR ARTIFICIAL OPENING ENDOSCOPIC: ICD-10-PCS | Performed by: INTERNAL MEDICINE

## 2019-10-07 PROCEDURE — 87077 CULTURE AEROBIC IDENTIFY: CPT

## 2019-10-07 PROCEDURE — 6360000002 HC RX W HCPCS: Performed by: INTERNAL MEDICINE

## 2019-10-07 PROCEDURE — 7100000010 HC PHASE II RECOVERY - FIRST 15 MIN: Performed by: INTERNAL MEDICINE

## 2019-10-07 PROCEDURE — 87015 SPECIMEN INFECT AGNT CONCNTJ: CPT

## 2019-10-07 PROCEDURE — 94669 MECHANICAL CHEST WALL OSCILL: CPT

## 2019-10-07 PROCEDURE — 85025 COMPLETE CBC W/AUTO DIFF WBC: CPT

## 2019-10-07 PROCEDURE — 36415 COLL VENOUS BLD VENIPUNCTURE: CPT

## 2019-10-07 RX ORDER — CLOTRIMAZOLE AND BETAMETHASONE DIPROPIONATE 10; .5 MG/ML; MG/ML
LOTION TOPICAL
Qty: 30 ML | Refills: 1 | Status: SHIPPED | OUTPATIENT
Start: 2019-10-07 | End: 2021-01-01 | Stop reason: ALTCHOICE

## 2019-10-07 RX ORDER — LIDOCAINE HYDROCHLORIDE 20 MG/ML
SOLUTION OROPHARYNGEAL PRN
Status: DISCONTINUED | OUTPATIENT
Start: 2019-10-07 | End: 2019-10-07 | Stop reason: ALTCHOICE

## 2019-10-07 RX ORDER — FENTANYL CITRATE 50 UG/ML
INJECTION, SOLUTION INTRAMUSCULAR; INTRAVENOUS PRN
Status: DISCONTINUED | OUTPATIENT
Start: 2019-10-07 | End: 2019-10-07 | Stop reason: SDUPTHER

## 2019-10-07 RX ORDER — CLARITHROMYCIN 500 MG/1
500 TABLET, COATED ORAL
Qty: 1 TABLET | Refills: 0
Start: 2019-10-09 | End: 2019-10-19

## 2019-10-07 RX ORDER — ETHAMBUTOL HYDROCHLORIDE 400 MG/1
400 TABLET, FILM COATED ORAL
Qty: 1 TABLET | Refills: 0
Start: 2019-10-09 | End: 2019-11-19 | Stop reason: SDUPTHER

## 2019-10-07 RX ORDER — PROPOFOL 10 MG/ML
INJECTION, EMULSION INTRAVENOUS PRN
Status: DISCONTINUED | OUTPATIENT
Start: 2019-10-07 | End: 2019-10-07 | Stop reason: SDUPTHER

## 2019-10-07 RX ORDER — ACYCLOVIR 200 MG/1
200 CAPSULE ORAL
Qty: 25 CAPSULE | Refills: 0 | Status: SHIPPED | OUTPATIENT
Start: 2019-10-07 | End: 2019-10-12

## 2019-10-07 RX ORDER — SODIUM CHLORIDE 9 MG/ML
INJECTION, SOLUTION INTRAVENOUS CONTINUOUS PRN
Status: DISCONTINUED | OUTPATIENT
Start: 2019-10-07 | End: 2019-10-07 | Stop reason: SDUPTHER

## 2019-10-07 RX ADMIN — BUDESONIDE 250 MCG: 0.25 SUSPENSION RESPIRATORY (INHALATION) at 10:16

## 2019-10-07 RX ADMIN — RIFAMPIN 300 MG: 300 CAPSULE ORAL at 09:04

## 2019-10-07 RX ADMIN — IPRATROPIUM BROMIDE AND ALBUTEROL SULFATE 1 AMPULE: .5; 3 SOLUTION RESPIRATORY (INHALATION) at 16:23

## 2019-10-07 RX ADMIN — SODIUM CHLORIDE: 9 INJECTION, SOLUTION INTRAVENOUS at 11:22

## 2019-10-07 RX ADMIN — IPRATROPIUM BROMIDE AND ALBUTEROL SULFATE 1 AMPULE: .5; 3 SOLUTION RESPIRATORY (INHALATION) at 13:41

## 2019-10-07 RX ADMIN — CLARITHROMYCIN 500 MG: 500 TABLET ORAL at 09:04

## 2019-10-07 RX ADMIN — IPRATROPIUM BROMIDE AND ALBUTEROL SULFATE 1 AMPULE: .5; 3 SOLUTION RESPIRATORY (INHALATION) at 10:16

## 2019-10-07 RX ADMIN — PANTOPRAZOLE SODIUM 40 MG: 40 TABLET, DELAYED RELEASE ORAL at 06:28

## 2019-10-07 RX ADMIN — FENTANYL CITRATE 50 MCG: 50 INJECTION, SOLUTION INTRAMUSCULAR; INTRAVENOUS at 11:43

## 2019-10-07 RX ADMIN — PROPOFOL 200 MG: 10 INJECTION, EMULSION INTRAVENOUS at 11:40

## 2019-10-07 RX ADMIN — CLOTRIMAZOLE AND BETAMETHASONE DIPROPIONATE: 10; .5 LOTION TOPICAL at 09:03

## 2019-10-07 RX ADMIN — ETHAMBUTOL HYDROCHLORIDE 400 MG: 400 TABLET ORAL at 09:04

## 2019-10-07 RX ADMIN — ACYCLOVIR 200 MG: 200 CAPSULE ORAL at 06:28

## 2019-10-07 RX ADMIN — FORMOTEROL FUMARATE DIHYDRATE 20 MCG: 20 SOLUTION RESPIRATORY (INHALATION) at 10:16

## 2019-10-07 RX ADMIN — ACYCLOVIR 200 MG: 200 CAPSULE ORAL at 14:46

## 2019-10-07 RX ADMIN — IBUPROFEN 600 MG: 600 TABLET, FILM COATED ORAL at 09:03

## 2019-10-07 RX ADMIN — IBUPROFEN 600 MG: 600 TABLET, FILM COATED ORAL at 13:14

## 2019-10-07 RX ADMIN — IPRATROPIUM BROMIDE AND ALBUTEROL SULFATE 1 AMPULE: .5; 3 SOLUTION RESPIRATORY (INHALATION) at 04:05

## 2019-10-07 RX ADMIN — FENTANYL CITRATE 50 MCG: 50 INJECTION, SOLUTION INTRAMUSCULAR; INTRAVENOUS at 11:38

## 2019-10-07 ASSESSMENT — PAIN DESCRIPTION - PROGRESSION
CLINICAL_PROGRESSION: NOT CHANGED

## 2019-10-07 ASSESSMENT — PAIN SCALES - GENERAL
PAINLEVEL_OUTOF10: 0

## 2019-10-07 ASSESSMENT — COPD QUESTIONNAIRES: CAT_SEVERITY: SEVERE

## 2019-10-07 ASSESSMENT — PAIN DESCRIPTION - LOCATION
LOCATION: CHEST

## 2019-10-07 ASSESSMENT — PAIN DESCRIPTION - PAIN TYPE
TYPE: ACUTE PAIN

## 2019-10-07 ASSESSMENT — ENCOUNTER SYMPTOMS: SHORTNESS OF BREATH: 1

## 2019-10-08 LAB
BLOOD CULTURE, ROUTINE: NORMAL
CULTURE, BLOOD 2: NORMAL

## 2019-10-10 LAB
BODY FLUID CULTURE, STERILE: NORMAL
GRAM STAIN ORDERABLE: NORMAL
GRAM STAIN RESULT: NORMAL

## 2019-11-11 LAB
FUNGUS (MYCOLOGY) CULTURE: NORMAL
FUNGUS STAIN: NORMAL

## 2019-11-19 ENCOUNTER — HOSPITAL ENCOUNTER (OUTPATIENT)
Age: 69
Discharge: HOME | End: 2019-11-19
Payer: MEDICARE

## 2019-11-19 LAB
ALBUMIN SERPL-MCNC: 4.7 G/DL (ref 3.5–5.2)
ALP BLD-CCNC: 72 U/L (ref 40–129)
ALT SERPL-CCNC: 8 U/L (ref 0–40)
ANION GAP SERPL CALCULATED.3IONS-SCNC: 14 MMOL/L (ref 7–16)
AST SERPL-CCNC: 15 U/L (ref 0–39)
BASOPHILS ABSOLUTE: 0.03 E9/L (ref 0–0.2)
BASOPHILS RELATIVE PERCENT: 0.6 % (ref 0–2)
BILIRUB SERPL-MCNC: 0.4 MG/DL (ref 0–1.2)
BUN BLDV-MCNC: 10 MG/DL (ref 8–23)
C-REACTIVE PROTEIN: 0.3 MG/DL (ref 0–0.4)
CALCIUM SERPL-MCNC: 10.1 MG/DL (ref 8.6–10.2)
CHLORIDE BLD-SCNC: 101 MMOL/L (ref 98–107)
CO2: 25 MMOL/L (ref 22–29)
CREAT SERPL-MCNC: 0.7 MG/DL (ref 0.7–1.2)
EOSINOPHILS ABSOLUTE: 0.1 E9/L (ref 0.05–0.5)
EOSINOPHILS RELATIVE PERCENT: 2 % (ref 0–6)
GFR AFRICAN AMERICAN: >60
GFR NON-AFRICAN AMERICAN: >60 ML/MIN/1.73
GLUCOSE BLD-MCNC: 92 MG/DL (ref 74–99)
HCT VFR BLD CALC: 51.4 % (ref 37–54)
HEMOGLOBIN: 16.5 G/DL (ref 12.5–16.5)
IMMATURE GRANULOCYTES #: 0.02 E9/L
IMMATURE GRANULOCYTES %: 0.4 % (ref 0–5)
LYMPHOCYTES ABSOLUTE: 1.45 E9/L (ref 1.5–4)
LYMPHOCYTES RELATIVE PERCENT: 29.7 % (ref 20–42)
MCH RBC QN AUTO: 28.5 PG (ref 26–35)
MCHC RBC AUTO-ENTMCNC: 32.1 % (ref 32–34.5)
MCV RBC AUTO: 88.9 FL (ref 80–99.9)
MONOCYTES ABSOLUTE: 0.44 E9/L (ref 0.1–0.95)
MONOCYTES RELATIVE PERCENT: 9 % (ref 2–12)
NEUTROPHILS ABSOLUTE: 2.85 E9/L (ref 1.8–7.3)
NEUTROPHILS RELATIVE PERCENT: 58.3 % (ref 43–80)
PDW BLD-RTO: 15.2 FL (ref 11.5–15)
PLATELET # BLD: 226 E9/L (ref 130–450)
PMV BLD AUTO: 9.9 FL (ref 7–12)
POTASSIUM SERPL-SCNC: 4.8 MMOL/L (ref 3.5–5)
RBC # BLD: 5.78 E12/L (ref 3.8–5.8)
SEDIMENTATION RATE, ERYTHROCYTE: 0 MM/HR (ref 0–15)
SODIUM BLD-SCNC: 140 MMOL/L (ref 132–146)
TOTAL PROTEIN: 7.1 G/DL (ref 6.4–8.3)
WBC # BLD: 4.9 E9/L (ref 4.5–11.5)

## 2019-11-19 PROCEDURE — 85025 COMPLETE CBC W/AUTO DIFF WBC: CPT

## 2019-11-19 PROCEDURE — 80053 COMPREHEN METABOLIC PANEL: CPT

## 2019-11-19 PROCEDURE — 86140 C-REACTIVE PROTEIN: CPT

## 2019-11-19 PROCEDURE — 36415 COLL VENOUS BLD VENIPUNCTURE: CPT

## 2019-11-19 PROCEDURE — 85651 RBC SED RATE NONAUTOMATED: CPT

## 2019-11-26 LAB
AFB CULTURE (MYCOBACTERIA): NORMAL
AFB SMEAR: NORMAL

## 2020-03-11 ENCOUNTER — HOSPITAL ENCOUNTER (OUTPATIENT)
Age: 70
Discharge: HOME | End: 2020-03-11
Payer: MEDICARE

## 2020-03-11 LAB
ALBUMIN SERPL-MCNC: 4.6 G/DL (ref 3.5–5.2)
ALP BLD-CCNC: 78 U/L (ref 40–129)
ALT SERPL-CCNC: 10 U/L (ref 0–40)
ANION GAP SERPL CALCULATED.3IONS-SCNC: 14 MMOL/L (ref 7–16)
AST SERPL-CCNC: 18 U/L (ref 0–39)
BASOPHILS ABSOLUTE: 0.03 E9/L (ref 0–0.2)
BASOPHILS RELATIVE PERCENT: 0.5 % (ref 0–2)
BILIRUB SERPL-MCNC: 0.6 MG/DL (ref 0–1.2)
BUN BLDV-MCNC: 15 MG/DL (ref 8–23)
C-REACTIVE PROTEIN: 0.3 MG/DL (ref 0–0.4)
CALCIUM SERPL-MCNC: 10 MG/DL (ref 8.6–10.2)
CHLORIDE BLD-SCNC: 100 MMOL/L (ref 98–107)
CO2: 25 MMOL/L (ref 22–29)
CREAT SERPL-MCNC: 0.9 MG/DL (ref 0.7–1.2)
EOSINOPHILS ABSOLUTE: 0.04 E9/L (ref 0.05–0.5)
EOSINOPHILS RELATIVE PERCENT: 0.6 % (ref 0–6)
GFR AFRICAN AMERICAN: >60
GFR NON-AFRICAN AMERICAN: >60 ML/MIN/1.73
GLUCOSE BLD-MCNC: 85 MG/DL (ref 74–99)
HCT VFR BLD CALC: 53.3 % (ref 37–54)
HEMOGLOBIN: 17.6 G/DL (ref 12.5–16.5)
IMMATURE GRANULOCYTES #: 0.02 E9/L
IMMATURE GRANULOCYTES %: 0.3 % (ref 0–5)
LYMPHOCYTES ABSOLUTE: 1.13 E9/L (ref 1.5–4)
LYMPHOCYTES RELATIVE PERCENT: 17.5 % (ref 20–42)
MCH RBC QN AUTO: 29.7 PG (ref 26–35)
MCHC RBC AUTO-ENTMCNC: 33 % (ref 32–34.5)
MCV RBC AUTO: 89.9 FL (ref 80–99.9)
MONOCYTES ABSOLUTE: 0.37 E9/L (ref 0.1–0.95)
MONOCYTES RELATIVE PERCENT: 5.7 % (ref 2–12)
NEUTROPHILS ABSOLUTE: 4.86 E9/L (ref 1.8–7.3)
NEUTROPHILS RELATIVE PERCENT: 75.4 % (ref 43–80)
PDW BLD-RTO: 14.6 FL (ref 11.5–15)
PLATELET # BLD: 186 E9/L (ref 130–450)
PMV BLD AUTO: 10.5 FL (ref 7–12)
POTASSIUM SERPL-SCNC: 3.7 MMOL/L (ref 3.5–5)
RBC # BLD: 5.93 E12/L (ref 3.8–5.8)
SEDIMENTATION RATE, ERYTHROCYTE: 1 MM/HR (ref 0–15)
SODIUM BLD-SCNC: 139 MMOL/L (ref 132–146)
TOTAL PROTEIN: 7.3 G/DL (ref 6.4–8.3)
WBC # BLD: 6.5 E9/L (ref 4.5–11.5)

## 2020-03-11 PROCEDURE — 85025 COMPLETE CBC W/AUTO DIFF WBC: CPT

## 2020-03-11 PROCEDURE — 85651 RBC SED RATE NONAUTOMATED: CPT

## 2020-03-11 PROCEDURE — 80053 COMPREHEN METABOLIC PANEL: CPT

## 2020-03-11 PROCEDURE — 36415 COLL VENOUS BLD VENIPUNCTURE: CPT

## 2020-03-11 PROCEDURE — 86140 C-REACTIVE PROTEIN: CPT

## 2020-06-16 ENCOUNTER — HOSPITAL ENCOUNTER (OUTPATIENT)
Age: 70
Discharge: HOME | End: 2020-06-16
Payer: MEDICARE

## 2020-06-16 LAB
ALBUMIN SERPL-MCNC: 4.3 G/DL (ref 3.5–5.2)
ALP BLD-CCNC: 62 U/L (ref 40–129)
ALT SERPL-CCNC: 9 U/L (ref 0–40)
ANION GAP SERPL CALCULATED.3IONS-SCNC: 7 MMOL/L (ref 7–16)
AST SERPL-CCNC: 20 U/L (ref 0–39)
BASOPHILS ABSOLUTE: 0.04 E9/L (ref 0–0.2)
BASOPHILS RELATIVE PERCENT: 0.9 % (ref 0–2)
BILIRUB SERPL-MCNC: 0.5 MG/DL (ref 0–1.2)
BUN BLDV-MCNC: 12 MG/DL (ref 8–23)
C-REACTIVE PROTEIN: 0.3 MG/DL (ref 0–0.4)
CALCIUM SERPL-MCNC: 9.5 MG/DL (ref 8.6–10.2)
CHLORIDE BLD-SCNC: 105 MMOL/L (ref 98–107)
CO2: 25 MMOL/L (ref 22–29)
CREAT SERPL-MCNC: 0.8 MG/DL (ref 0.7–1.2)
EOSINOPHILS ABSOLUTE: 0.08 E9/L (ref 0.05–0.5)
EOSINOPHILS RELATIVE PERCENT: 1.8 % (ref 0–6)
GFR AFRICAN AMERICAN: >60
GFR NON-AFRICAN AMERICAN: >60 ML/MIN/1.73
GLUCOSE BLD-MCNC: 98 MG/DL (ref 74–99)
HCT VFR BLD CALC: 51.1 % (ref 37–54)
HEMOGLOBIN: 16.9 G/DL (ref 12.5–16.5)
IMMATURE GRANULOCYTES #: 0.01 E9/L
IMMATURE GRANULOCYTES %: 0.2 % (ref 0–5)
LYMPHOCYTES ABSOLUTE: 1.09 E9/L (ref 1.5–4)
LYMPHOCYTES RELATIVE PERCENT: 23.9 % (ref 20–42)
MCH RBC QN AUTO: 30.4 PG (ref 26–35)
MCHC RBC AUTO-ENTMCNC: 33.1 % (ref 32–34.5)
MCV RBC AUTO: 91.9 FL (ref 80–99.9)
MONOCYTES ABSOLUTE: 0.38 E9/L (ref 0.1–0.95)
MONOCYTES RELATIVE PERCENT: 8.3 % (ref 2–12)
NEUTROPHILS ABSOLUTE: 2.97 E9/L (ref 1.8–7.3)
NEUTROPHILS RELATIVE PERCENT: 64.9 % (ref 43–80)
PDW BLD-RTO: 14.3 FL (ref 11.5–15)
PLATELET # BLD: 184 E9/L (ref 130–450)
PMV BLD AUTO: 10 FL (ref 7–12)
POTASSIUM SERPL-SCNC: 4.1 MMOL/L (ref 3.5–5)
RBC # BLD: 5.56 E12/L (ref 3.8–5.8)
SEDIMENTATION RATE, ERYTHROCYTE: 1 MM/HR (ref 0–15)
SODIUM BLD-SCNC: 137 MMOL/L (ref 132–146)
TOTAL PROTEIN: 6.9 G/DL (ref 6.4–8.3)
WBC # BLD: 4.6 E9/L (ref 4.5–11.5)

## 2020-06-16 PROCEDURE — 36415 COLL VENOUS BLD VENIPUNCTURE: CPT

## 2020-06-16 PROCEDURE — 85651 RBC SED RATE NONAUTOMATED: CPT

## 2020-06-16 PROCEDURE — 86140 C-REACTIVE PROTEIN: CPT

## 2020-06-16 PROCEDURE — 85025 COMPLETE CBC W/AUTO DIFF WBC: CPT

## 2020-06-16 PROCEDURE — 80053 COMPREHEN METABOLIC PANEL: CPT

## 2020-08-21 VITALS
HEIGHT: 73 IN | DIASTOLIC BLOOD PRESSURE: 70 MMHG | WEIGHT: 201 LBS | SYSTOLIC BLOOD PRESSURE: 132 MMHG | BODY MASS INDEX: 26.64 KG/M2

## 2020-08-21 RX ORDER — TRAMADOL HYDROCHLORIDE 50 MG/1
50 TABLET ORAL EVERY 6 HOURS PRN
COMMUNITY
End: 2020-12-10

## 2020-08-26 NOTE — PROGRESS NOTES
Tolerated infusion well. Therapy plan reviewed with patient. Verbalizes understanding. Reviewed AVS with patient, reviewed medication information, verbalizes good knowledge of current plan, and has no signs or symptoms to report at this time. Declines copy of AVS. Patient instructed on s/s infection/complication with PICC line to report and instructed on keeping PICC dressing clean, dry and intact patient verbalizes understanding. Next appointment scheduled. FROM

## 2020-09-14 ENCOUNTER — HOSPITAL ENCOUNTER (OUTPATIENT)
Age: 70
Discharge: HOME | End: 2020-09-14
Payer: MEDICARE

## 2020-09-14 LAB
ALBUMIN SERPL-MCNC: 4.3 G/DL (ref 3.5–5.2)
ALP BLD-CCNC: 77 U/L (ref 40–129)
ALT SERPL-CCNC: 9 U/L (ref 0–40)
ANION GAP SERPL CALCULATED.3IONS-SCNC: 8 MMOL/L (ref 7–16)
AST SERPL-CCNC: 14 U/L (ref 0–39)
BASOPHILS ABSOLUTE: 0.04 E9/L (ref 0–0.2)
BASOPHILS RELATIVE PERCENT: 0.8 % (ref 0–2)
BILIRUB SERPL-MCNC: 0.5 MG/DL (ref 0–1.2)
BUN BLDV-MCNC: 13 MG/DL (ref 8–23)
C-REACTIVE PROTEIN: 0.3 MG/DL (ref 0–0.4)
CALCIUM SERPL-MCNC: 10.3 MG/DL (ref 8.6–10.2)
CHLORIDE BLD-SCNC: 103 MMOL/L (ref 98–107)
CO2: 29 MMOL/L (ref 22–29)
CREAT SERPL-MCNC: 0.8 MG/DL (ref 0.7–1.2)
EOSINOPHILS ABSOLUTE: 0.09 E9/L (ref 0.05–0.5)
EOSINOPHILS RELATIVE PERCENT: 1.7 % (ref 0–6)
GFR AFRICAN AMERICAN: >60
GFR NON-AFRICAN AMERICAN: >60 ML/MIN/1.73
GLUCOSE BLD-MCNC: 80 MG/DL (ref 74–99)
HCT VFR BLD CALC: 55.9 % (ref 37–54)
HEMOGLOBIN: 18.3 G/DL (ref 12.5–16.5)
IMMATURE GRANULOCYTES #: 0.02 E9/L
IMMATURE GRANULOCYTES %: 0.4 % (ref 0–5)
LYMPHOCYTES ABSOLUTE: 1.14 E9/L (ref 1.5–4)
LYMPHOCYTES RELATIVE PERCENT: 21.8 % (ref 20–42)
MCH RBC QN AUTO: 29.5 PG (ref 26–35)
MCHC RBC AUTO-ENTMCNC: 32.7 % (ref 32–34.5)
MCV RBC AUTO: 90 FL (ref 80–99.9)
MONOCYTES ABSOLUTE: 0.41 E9/L (ref 0.1–0.95)
MONOCYTES RELATIVE PERCENT: 7.8 % (ref 2–12)
NEUTROPHILS ABSOLUTE: 3.53 E9/L (ref 1.8–7.3)
NEUTROPHILS RELATIVE PERCENT: 67.5 % (ref 43–80)
PDW BLD-RTO: 15.4 FL (ref 11.5–15)
PLATELET # BLD: 186 E9/L (ref 130–450)
PMV BLD AUTO: 9.9 FL (ref 7–12)
POTASSIUM SERPL-SCNC: 5.4 MMOL/L (ref 3.5–5)
RBC # BLD: 6.21 E12/L (ref 3.8–5.8)
SEDIMENTATION RATE, ERYTHROCYTE: 0 MM/HR (ref 0–15)
SODIUM BLD-SCNC: 140 MMOL/L (ref 132–146)
TOTAL PROTEIN: 7 G/DL (ref 6.4–8.3)
WBC # BLD: 5.2 E9/L (ref 4.5–11.5)

## 2020-09-14 PROCEDURE — 85651 RBC SED RATE NONAUTOMATED: CPT

## 2020-09-14 PROCEDURE — 80053 COMPREHEN METABOLIC PANEL: CPT

## 2020-09-14 PROCEDURE — 36415 COLL VENOUS BLD VENIPUNCTURE: CPT

## 2020-09-14 PROCEDURE — 86140 C-REACTIVE PROTEIN: CPT

## 2020-09-14 PROCEDURE — 85025 COMPLETE CBC W/AUTO DIFF WBC: CPT

## 2020-10-12 ENCOUNTER — HOSPITAL ENCOUNTER (OUTPATIENT)
Age: 70
Discharge: HOME | End: 2020-10-14
Payer: MEDICARE

## 2020-10-12 ENCOUNTER — OFFICE VISIT (OUTPATIENT)
Dept: PRIMARY CARE CLINIC | Age: 70
End: 2020-10-12
Payer: MEDICARE

## 2020-10-12 VITALS
SYSTOLIC BLOOD PRESSURE: 128 MMHG | OXYGEN SATURATION: 89 % | DIASTOLIC BLOOD PRESSURE: 82 MMHG | HEIGHT: 72 IN | BODY MASS INDEX: 28.17 KG/M2 | HEART RATE: 77 BPM | WEIGHT: 208 LBS

## 2020-10-12 PROCEDURE — G8482 FLU IMMUNIZE ORDER/ADMIN: HCPCS | Performed by: NURSE PRACTITIONER

## 2020-10-12 PROCEDURE — 1123F ACP DISCUSS/DSCN MKR DOCD: CPT | Performed by: NURSE PRACTITIONER

## 2020-10-12 PROCEDURE — 1036F TOBACCO NON-USER: CPT | Performed by: NURSE PRACTITIONER

## 2020-10-12 PROCEDURE — U0003 INFECTIOUS AGENT DETECTION BY NUCLEIC ACID (DNA OR RNA); SEVERE ACUTE RESPIRATORY SYNDROME CORONAVIRUS 2 (SARS-COV-2) (CORONAVIRUS DISEASE [COVID-19]), AMPLIFIED PROBE TECHNIQUE, MAKING USE OF HIGH THROUGHPUT TECHNOLOGIES AS DESCRIBED BY CMS-2020-01-R: HCPCS

## 2020-10-12 PROCEDURE — 99213 OFFICE O/P EST LOW 20 MIN: CPT | Performed by: NURSE PRACTITIONER

## 2020-10-12 PROCEDURE — 4040F PNEUMOC VAC/ADMIN/RCVD: CPT | Performed by: NURSE PRACTITIONER

## 2020-10-12 PROCEDURE — 3017F COLORECTAL CA SCREEN DOC REV: CPT | Performed by: NURSE PRACTITIONER

## 2020-10-12 PROCEDURE — G8427 DOCREV CUR MEDS BY ELIG CLIN: HCPCS | Performed by: NURSE PRACTITIONER

## 2020-10-12 PROCEDURE — 3023F SPIROM DOC REV: CPT | Performed by: NURSE PRACTITIONER

## 2020-10-12 PROCEDURE — G8926 SPIRO NO PERF OR DOC: HCPCS | Performed by: NURSE PRACTITIONER

## 2020-10-12 PROCEDURE — G8417 CALC BMI ABV UP PARAM F/U: HCPCS | Performed by: NURSE PRACTITIONER

## 2020-10-12 RX ORDER — GABAPENTIN 600 MG/1
TABLET ORAL
Status: ON HOLD | COMMUNITY
Start: 2020-08-28 | End: 2020-10-15 | Stop reason: ALTCHOICE

## 2020-10-12 NOTE — PROGRESS NOTES
never used smokeless tobacco. He reports that he does not drink alcohol or use drugs. Family History: family history includes Other in his father and mother. Allergies: Patient has no known allergies. Physical Exam   Vital Signs:  /82   Pulse 77   Ht 6' (1.829 m)   Wt 208 lb (94.3 kg)   SpO2 (!) 89% Comment: on room air. supposed to be on 3.5L NC continuously. BMI 28.21 kg/m²    Oxygen Saturation Interpretation: Abnormal - but at baseline. Constitutional:  Alert, development consistent with age. NAD. Head:  NC/NT. Airway patent. Ears: TMs clear bilaterally. Canals without exudate or swelling bilaterally. Mouth: Posterior pharynx with mild erythema and clear postnasal drip. No tonsillar hypertrophy or exudate. Neck:  Normal ROM. Supple. There is no anterior cervical adenopathy noted. Lungs: decreased breath sounds bilaterally without wheezes, rales, or rhonchi. CV:  Regular rate and rhythm, normal heart sounds, without pathological murmurs, ectopy, gallops, or rubs. Skin:  Normal turgor. Warm, dry, without visible rash. Lymphatic: No lymphangitis or adenopathy noted. Neurological:  Oriented. Motor functions intact. Lab / Imaging Results   (All laboratory and radiology results have been personally reviewed by myself)  Labs:  No results found for this visit on 10/12/20. Imaging: All Radiology results interpreted by Radiologist unless otherwise noted. No results found. Medical Decision Making   Pt non-toxic, in no apparent distress and stable at time of discharge. Assessment/Plan   Mae Arauz was seen today for cough and chest congestion. Diagnoses and all orders for this visit:    Chronic obstructive pulmonary disease, unspecified COPD type ([facility] [address]        - Pt already on abx regimen from ID, advised him to call both ID and pulmonology for recommendations for steroids as I do believe he would benefit from some.      Encounter for laboratory testing for COVID-19 virus  -

## 2020-10-13 LAB
SARS-COV-2: NOT DETECTED
SOURCE: NORMAL

## 2020-10-15 ENCOUNTER — APPOINTMENT (OUTPATIENT)
Dept: CT IMAGING | Age: 70
End: 2020-10-15
Payer: MEDICARE

## 2020-10-15 ENCOUNTER — APPOINTMENT (OUTPATIENT)
Dept: GENERAL RADIOLOGY | Age: 70
End: 2020-10-15
Payer: MEDICARE

## 2020-10-15 ENCOUNTER — HOSPITAL ENCOUNTER (OUTPATIENT)
Age: 70
Setting detail: OBSERVATION
Discharge: HOME | End: 2020-10-17
Attending: EMERGENCY MEDICINE | Admitting: INTERNAL MEDICINE
Payer: MEDICARE

## 2020-10-15 PROBLEM — R07.9 CHEST PAIN: Status: ACTIVE | Noted: 2020-10-15

## 2020-10-15 LAB
ADENOVIRUS BY PCR: NOT DETECTED
ALBUMIN SERPL-MCNC: 3.8 G/DL (ref 3.5–5.2)
ALP BLD-CCNC: 71 U/L (ref 40–129)
ALT SERPL-CCNC: 7 U/L (ref 0–40)
ANION GAP SERPL CALCULATED.3IONS-SCNC: 9 MMOL/L (ref 7–16)
AST SERPL-CCNC: 15 U/L (ref 0–39)
BASOPHILS ABSOLUTE: 0.02 E9/L (ref 0–0.2)
BASOPHILS RELATIVE PERCENT: 0.5 % (ref 0–2)
BILIRUB SERPL-MCNC: 0.4 MG/DL (ref 0–1.2)
BORDETELLA PARAPERTUSSIS BY PCR: NOT DETECTED
BORDETELLA PERTUSSIS BY PCR: NOT DETECTED
BUN BLDV-MCNC: 10 MG/DL (ref 8–23)
CALCIUM SERPL-MCNC: 9.5 MG/DL (ref 8.6–10.2)
CHLAMYDOPHILIA PNEUMONIAE BY PCR: NOT DETECTED
CHLORIDE BLD-SCNC: 100 MMOL/L (ref 98–107)
CO2: 24 MMOL/L (ref 22–29)
CORONAVIRUS 229E BY PCR: NOT DETECTED
CORONAVIRUS HKU1 BY PCR: NOT DETECTED
CORONAVIRUS NL63 BY PCR: NOT DETECTED
CORONAVIRUS OC43 BY PCR: NOT DETECTED
CREAT SERPL-MCNC: 0.9 MG/DL (ref 0.7–1.2)
EKG ATRIAL RATE: 67 BPM
EKG P AXIS: 53 DEGREES
EKG P-R INTERVAL: 190 MS
EKG Q-T INTERVAL: 406 MS
EKG QRS DURATION: 88 MS
EKG QTC CALCULATION (BAZETT): 429 MS
EKG R AXIS: -45 DEGREES
EKG T AXIS: 43 DEGREES
EKG VENTRICULAR RATE: 67 BPM
EOSINOPHILS ABSOLUTE: 0.01 E9/L (ref 0.05–0.5)
EOSINOPHILS RELATIVE PERCENT: 0.2 % (ref 0–6)
GFR AFRICAN AMERICAN: >60
GFR NON-AFRICAN AMERICAN: >60 ML/MIN/1.73
GLUCOSE BLD-MCNC: 96 MG/DL (ref 74–99)
HCT VFR BLD CALC: 53.3 % (ref 37–54)
HEMOGLOBIN: 17.3 G/DL (ref 12.5–16.5)
HUMAN METAPNEUMOVIRUS BY PCR: NOT DETECTED
HUMAN RHINOVIRUS/ENTEROVIRUS BY PCR: DETECTED
IMMATURE GRANULOCYTES #: 0.01 E9/L
IMMATURE GRANULOCYTES %: 0.2 % (ref 0–5)
INFLUENZA A BY PCR: NOT DETECTED
INFLUENZA B BY PCR: NOT DETECTED
LACTIC ACID, SEPSIS: 0.8 MMOL/L (ref 0.5–1.9)
LYMPHOCYTES ABSOLUTE: 0.65 E9/L (ref 1.5–4)
LYMPHOCYTES RELATIVE PERCENT: 16 % (ref 20–42)
MCH RBC QN AUTO: 29.6 PG (ref 26–35)
MCHC RBC AUTO-ENTMCNC: 32.5 % (ref 32–34.5)
MCV RBC AUTO: 91.1 FL (ref 80–99.9)
MONOCYTES ABSOLUTE: 0.53 E9/L (ref 0.1–0.95)
MONOCYTES RELATIVE PERCENT: 13.1 % (ref 2–12)
MYCOPLASMA PNEUMONIAE BY PCR: NOT DETECTED
NEUTROPHILS ABSOLUTE: 2.84 E9/L (ref 1.8–7.3)
NEUTROPHILS RELATIVE PERCENT: 70 % (ref 43–80)
PARAINFLUENZA VIRUS 1 BY PCR: NOT DETECTED
PARAINFLUENZA VIRUS 2 BY PCR: NOT DETECTED
PARAINFLUENZA VIRUS 3 BY PCR: NOT DETECTED
PARAINFLUENZA VIRUS 4 BY PCR: NOT DETECTED
PDW BLD-RTO: 15.4 FL (ref 11.5–15)
PLATELET # BLD: 153 E9/L (ref 130–450)
PMV BLD AUTO: 10 FL (ref 7–12)
POTASSIUM SERPL-SCNC: 3.6 MMOL/L (ref 3.5–5)
PRO-BNP: 194 PG/ML (ref 0–125)
PRO-BNP: 351 PG/ML (ref 0–125)
RBC # BLD: 5.85 E12/L (ref 3.8–5.8)
RESPIRATORY SYNCYTIAL VIRUS BY PCR: NOT DETECTED
SARS-COV-2, NAAT: NOT DETECTED
SARS-COV-2, PCR: NOT DETECTED
SODIUM BLD-SCNC: 133 MMOL/L (ref 132–146)
T4 FREE: 0.97 NG/DL (ref 0.93–1.7)
TOTAL PROTEIN: 6.7 G/DL (ref 6.4–8.3)
TROPONIN: <0.01 NG/ML (ref 0–0.03)
WBC # BLD: 4.1 E9/L (ref 4.5–11.5)

## 2020-10-15 PROCEDURE — 2580000003 HC RX 258: Performed by: INTERNAL MEDICINE

## 2020-10-15 PROCEDURE — 94640 AIRWAY INHALATION TREATMENT: CPT

## 2020-10-15 PROCEDURE — 6370000000 HC RX 637 (ALT 250 FOR IP): Performed by: EMERGENCY MEDICINE

## 2020-10-15 PROCEDURE — 6370000000 HC RX 637 (ALT 250 FOR IP): Performed by: INTERNAL MEDICINE

## 2020-10-15 PROCEDURE — 80053 COMPREHEN METABOLIC PANEL: CPT

## 2020-10-15 PROCEDURE — 87040 BLOOD CULTURE FOR BACTERIA: CPT

## 2020-10-15 PROCEDURE — 0202U NFCT DS 22 TRGT SARS-COV-2: CPT

## 2020-10-15 PROCEDURE — 93005 ELECTROCARDIOGRAM TRACING: CPT | Performed by: EMERGENCY MEDICINE

## 2020-10-15 PROCEDURE — 83605 ASSAY OF LACTIC ACID: CPT

## 2020-10-15 PROCEDURE — 85025 COMPLETE CBC W/AUTO DIFF WBC: CPT

## 2020-10-15 PROCEDURE — G0378 HOSPITAL OBSERVATION PER HR: HCPCS

## 2020-10-15 PROCEDURE — 71275 CT ANGIOGRAPHY CHEST: CPT

## 2020-10-15 PROCEDURE — U0002 COVID-19 LAB TEST NON-CDC: HCPCS

## 2020-10-15 PROCEDURE — 99285 EMERGENCY DEPT VISIT HI MDM: CPT

## 2020-10-15 PROCEDURE — 83880 ASSAY OF NATRIURETIC PEPTIDE: CPT

## 2020-10-15 PROCEDURE — 6360000004 HC RX CONTRAST MEDICATION: Performed by: RADIOLOGY

## 2020-10-15 PROCEDURE — 36415 COLL VENOUS BLD VENIPUNCTURE: CPT

## 2020-10-15 PROCEDURE — 84439 ASSAY OF FREE THYROXINE: CPT

## 2020-10-15 PROCEDURE — 71045 X-RAY EXAM CHEST 1 VIEW: CPT

## 2020-10-15 PROCEDURE — 6360000002 HC RX W HCPCS: Performed by: INTERNAL MEDICINE

## 2020-10-15 PROCEDURE — 84484 ASSAY OF TROPONIN QUANT: CPT

## 2020-10-15 PROCEDURE — 94664 DEMO&/EVAL PT USE INHALER: CPT

## 2020-10-15 RX ORDER — ETHAMBUTOL HYDROCHLORIDE 400 MG/1
800 TABLET, FILM COATED ORAL
Status: DISCONTINUED | OUTPATIENT
Start: 2020-10-16 | End: 2020-10-17 | Stop reason: HOSPADM

## 2020-10-15 RX ORDER — ARFORMOTEROL TARTRATE 15 UG/2ML
15 SOLUTION RESPIRATORY (INHALATION) 2 TIMES DAILY
Status: DISCONTINUED | OUTPATIENT
Start: 2020-10-15 | End: 2020-10-17 | Stop reason: HOSPADM

## 2020-10-15 RX ORDER — OXYCODONE HYDROCHLORIDE AND ACETAMINOPHEN 5; 325 MG/1; MG/1
1 TABLET ORAL EVERY 6 HOURS PRN
Status: DISCONTINUED | OUTPATIENT
Start: 2020-10-15 | End: 2020-10-17 | Stop reason: HOSPADM

## 2020-10-15 RX ORDER — GABAPENTIN 300 MG/1
600 CAPSULE ORAL NIGHTLY
Status: DISCONTINUED | OUTPATIENT
Start: 2020-10-15 | End: 2020-10-17 | Stop reason: HOSPADM

## 2020-10-15 RX ORDER — TRAMADOL HYDROCHLORIDE 50 MG/1
50 TABLET ORAL EVERY 6 HOURS PRN
Status: DISCONTINUED | OUTPATIENT
Start: 2020-10-15 | End: 2020-10-17 | Stop reason: HOSPADM

## 2020-10-15 RX ORDER — GABAPENTIN 300 MG/1
300 CAPSULE ORAL 2 TIMES DAILY
Status: DISCONTINUED | OUTPATIENT
Start: 2020-10-16 | End: 2020-10-17 | Stop reason: HOSPADM

## 2020-10-15 RX ORDER — ACETAMINOPHEN 325 MG/1
650 TABLET ORAL EVERY 4 HOURS PRN
Status: DISCONTINUED | OUTPATIENT
Start: 2020-10-15 | End: 2020-10-17 | Stop reason: HOSPADM

## 2020-10-15 RX ORDER — SODIUM CHLORIDE 9 MG/ML
INJECTION, SOLUTION INTRAVENOUS CONTINUOUS
Status: DISCONTINUED | OUTPATIENT
Start: 2020-10-15 | End: 2020-10-16

## 2020-10-15 RX ORDER — OXYCODONE HYDROCHLORIDE 5 MG/1
2.5 TABLET ORAL EVERY 6 HOURS PRN
Status: DISCONTINUED | OUTPATIENT
Start: 2020-10-15 | End: 2020-10-17 | Stop reason: HOSPADM

## 2020-10-15 RX ORDER — PANTOPRAZOLE SODIUM 40 MG/1
40 TABLET, DELAYED RELEASE ORAL
Status: DISCONTINUED | OUTPATIENT
Start: 2020-10-16 | End: 2020-10-17 | Stop reason: HOSPADM

## 2020-10-15 RX ORDER — CLARITHROMYCIN 500 MG/1
1000 TABLET, COATED ORAL
Status: DISCONTINUED | OUTPATIENT
Start: 2020-10-16 | End: 2020-10-17 | Stop reason: HOSPADM

## 2020-10-15 RX ORDER — CLOTRIMAZOLE AND BETAMETHASONE DIPROPIONATE 10; .5 MG/ML; MG/ML
LOTION TOPICAL 2 TIMES DAILY
Status: DISCONTINUED | OUTPATIENT
Start: 2020-10-15 | End: 2020-10-17 | Stop reason: HOSPADM

## 2020-10-15 RX ORDER — BUDESONIDE 0.25 MG/2ML
250 INHALANT ORAL 2 TIMES DAILY
Status: DISCONTINUED | OUTPATIENT
Start: 2020-10-15 | End: 2020-10-17 | Stop reason: HOSPADM

## 2020-10-15 RX ORDER — IPRATROPIUM BROMIDE AND ALBUTEROL SULFATE 2.5; .5 MG/3ML; MG/3ML
1 SOLUTION RESPIRATORY (INHALATION)
Status: DISCONTINUED | OUTPATIENT
Start: 2020-10-16 | End: 2020-10-17 | Stop reason: HOSPADM

## 2020-10-15 RX ORDER — OXYCODONE AND ACETAMINOPHEN 7.5; 325 MG/1; MG/1
1 TABLET ORAL EVERY 6 HOURS PRN
Status: DISCONTINUED | OUTPATIENT
Start: 2020-10-15 | End: 2020-10-15 | Stop reason: CLARIF

## 2020-10-15 RX ORDER — ASPIRIN 325 MG
325 TABLET ORAL ONCE
Status: COMPLETED | OUTPATIENT
Start: 2020-10-15 | End: 2020-10-15

## 2020-10-15 RX ORDER — IBUPROFEN 200 MG
200 TABLET ORAL
Status: DISCONTINUED | OUTPATIENT
Start: 2020-10-16 | End: 2020-10-17 | Stop reason: HOSPADM

## 2020-10-15 RX ADMIN — IOPAMIDOL 75 ML: 755 INJECTION, SOLUTION INTRAVENOUS at 14:58

## 2020-10-15 RX ADMIN — BUDESONIDE 250 MCG: 0.25 SUSPENSION RESPIRATORY (INHALATION) at 21:16

## 2020-10-15 RX ADMIN — IPRATROPIUM BROMIDE AND ALBUTEROL SULFATE 1 AMPULE: .5; 3 SOLUTION RESPIRATORY (INHALATION) at 21:17

## 2020-10-15 RX ADMIN — ARFORMOTEROL TARTRATE 15 MCG: 15 SOLUTION RESPIRATORY (INHALATION) at 21:17

## 2020-10-15 RX ADMIN — SODIUM CHLORIDE: 9 INJECTION, SOLUTION INTRAVENOUS at 22:18

## 2020-10-15 RX ADMIN — GABAPENTIN 300 MG: 300 CAPSULE ORAL at 22:18

## 2020-10-15 RX ADMIN — ASPIRIN 325 MG: 325 TABLET, FILM COATED ORAL at 18:48

## 2020-10-15 ASSESSMENT — PAIN DESCRIPTION - ORIENTATION
ORIENTATION: LOWER
ORIENTATION: RIGHT

## 2020-10-15 ASSESSMENT — PAIN DESCRIPTION - DESCRIPTORS
DESCRIPTORS: ACHING;CONSTANT;DULL
DESCRIPTORS: PRESSURE

## 2020-10-15 ASSESSMENT — PAIN SCALES - GENERAL
PAINLEVEL_OUTOF10: 5
PAINLEVEL_OUTOF10: 5

## 2020-10-15 ASSESSMENT — PAIN DESCRIPTION - PROGRESSION: CLINICAL_PROGRESSION: NOT CHANGED

## 2020-10-15 ASSESSMENT — PAIN DESCRIPTION - PAIN TYPE
TYPE: CHRONIC PAIN
TYPE: ACUTE PAIN

## 2020-10-15 ASSESSMENT — PAIN DESCRIPTION - LOCATION
LOCATION: BACK
LOCATION: CHEST

## 2020-10-15 ASSESSMENT — PAIN DESCRIPTION - FREQUENCY
FREQUENCY: CONTINUOUS
FREQUENCY: CONTINUOUS

## 2020-10-15 ASSESSMENT — PAIN DESCRIPTION - ONSET: ONSET: ON-GOING

## 2020-10-15 ASSESSMENT — PAIN - FUNCTIONAL ASSESSMENT: PAIN_FUNCTIONAL_ASSESSMENT: PREVENTS OR INTERFERES SOME ACTIVE ACTIVITIES AND ADLS

## 2020-10-15 NOTE — ED PROVIDER NOTES
HPI:  10/15/20,   Time: 12:51 PM EDT       Remy Smart is a 78 y.o. male presenting to the ED for shortness of breath and productive cough, beginning the days ago. The complaint has been persistent, moderate in severity, and worsened by nothing. Patient is a 68-year-old gentleman with history of multiple fungal as well as bacterial lung infection. He was last hospitalized 1 year ago he still follows outpatient with Dr. Millicent Glalagher for infectious disease he continues to take oral antibiotics clarithromycin ethambutol and rifampin. States that the course of the last 3 days of increasing shortness of breath despite his 3 and half liters of oxygen at home. He had a slightly productive cough. Heaviness pressure chest pain. No pleuritic pain no leg swelling. States he had a fever of 100.2 yesterday. No nausea no vomiting no abdominal pain. No acute chest pain at this time. Review of Systems:   Pertinent positives and negatives are stated within HPI, all other systems reviewed and are negative.          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     Past Surgical History:  has a past surgical history that includes Abscess Drainage (2006); Colonoscopy (11/18/2013); lumbar fusion (03/2019); bronchoscopy (N/A, 6/12/2019); and bronchoscopy (N/A, 10/7/2019). Social History:  reports that he quit smoking about 6 years ago. His smoking use included cigarettes. He started smoking about 52 years ago. He has a 92.00 pack-year smoking history. He has never used smokeless tobacco. He reports that he does not drink alcohol or use drugs. Family History: family history includes Other in his father and mother. The patients home medications have been reviewed. Allergies: Patient has no known allergies. ---------------------------------------------------PHYSICAL EXAM--------------------------------------    Constitutional/General: Alert and oriented x3, well appearing, non toxic in NAD; speaking full sentences. Head: Normocephalic and atraumatic  Eyes: PERRL, EOMI, conjunctive normal, sclera non icteric  Mouth: Oropharynx clear, handling secretions, no trismus, no asymmetry of the posterior oropharynx or uvular edema  Neck: Supple, full ROM, non tender to palpation in the midline, no stridor, no crepitus, no meningeal signs  Respiratory: Lungs clear to auscultation bilaterally, no wheezes, rales, or rhonchi. Not in respiratory distress; no tachypnea; no accessory muscles; no retractions. Cardiovascular:  Regular rate. Regular rhythm. No murmurs, gallops, or rubs. 2+ distal pulses  Chest: No chest wall tenderness  GI:  Abdomen Soft, Non tender, Non distended. +BS. No organomegaly, no palpable masses,  No rebound, guarding, or rigidity. Musculoskeletal: Moves all extremities x 4. Warm and well perfused, no clubbing, cyanosis, or edema. Capillary refill <3 seconds  Integument: skin warm and dry. No rashes.    Lymphatic: no lymphadenopathy noted  Neurologic: GCS 15, no focal deficits, symmetric strength 5/5 in the upper and lower extremities bilaterally  Psychiatric: Normal Affect    -------------------------------------------------- RESULTS -------------------------------------------------  I have personally reviewed all laboratory and imaging results for this patient. Results are listed below.      LABS:  Results for orders placed or performed during the hospital encounter of 10/15/20   CBC Auto Differential   Result Value Ref Range    WBC 4.1 (L) 4.5 - 11.5 E9/L    RBC 5.85 (H) 3.80 - 5.80 E12/L    Hemoglobin 17.3 (H) 12.5 - 16.5 g/dL    Hematocrit 53.3 37.0 - 54.0 %    MCV 91.1 80.0 - 99.9 fL    MCH 29.6 26.0 - 35.0 pg    MCHC 32.5 32.0 - 34.5 %    RDW 15.4 (H) 11.5 - 15.0 fL    Platelets 768 715 - 489 E9/L    MPV 10.0 7.0 - 12.0 fL    Neutrophils % 70.0 43.0 - 80.0 %    Immature Granulocytes % 0.2 0.0 - 5.0 %    Lymphocytes % 16.0 (L) 20.0 - 42.0 %    Monocytes % 13.1 (H) 2.0 - 12.0 %    Eosinophils % 0.2 0.0 - 6.0 %    Basophils % 0.5 0.0 - 2.0 %    Neutrophils Absolute 2.84 1.80 - 7.30 E9/L    Immature Granulocytes # 0.01 E9/L    Lymphocytes Absolute 0.65 (L) 1.50 - 4.00 E9/L    Monocytes Absolute 0.53 0.10 - 0.95 E9/L    Eosinophils Absolute 0.01 (L) 0.05 - 0.50 E9/L    Basophils Absolute 0.02 0.00 - 0.20 E9/L   Comprehensive Metabolic Panel   Result Value Ref Range    Sodium 133 132 - 146 mmol/L    Potassium 3.6 3.5 - 5.0 mmol/L    Chloride 100 98 - 107 mmol/L    CO2 24 22 - 29 mmol/L    Anion Gap 9 7 - 16 mmol/L    Glucose 96 74 - 99 mg/dL    BUN 10 8 - 23 mg/dL    CREATININE 0.9 0.7 - 1.2 mg/dL    GFR Non-African American >60 >=60 mL/min/1.73    GFR African American >60     Calcium 9.5 8.6 - 10.2 mg/dL    Total Protein 6.7 6.4 - 8.3 g/dL    Alb 3.8 3.5 - 5.2 g/dL    Total Bilirubin 0.4 0.0 - 1.2 mg/dL    Alkaline Phosphatase 71 40 - 129 U/L    ALT 7 0 - 40 U/L    AST 15 0 - 39 U/L   Troponin   Result Value Ref Range    Troponin <0.01 0.00 - 0.03 ng/mL   Brain Natriuretic Peptide   Result Value Ref Range    Pro- (H) 0 - 125 pg/mL   Lactate, Sepsis   Result Value Ref Range    Lactic Acid, Sepsis 0.8 0.5 - 1.9 mmol/L   COVID-19 Result Value Ref Range    SARS-CoV-2, NAAT Not Detected Not Detected   EKG 12 Lead   Result Value Ref Range    Ventricular Rate 67 BPM    Atrial Rate 67 BPM    P-R Interval 190 ms    QRS Duration 88 ms    Q-T Interval 406 ms    QTc Calculation (Bazett) 429 ms    P Axis 53 degrees    R Axis -45 degrees    T Axis 43 degrees       RADIOLOGY:  Interpreted by Radiologist.  CTA PULMONARY W CONTRAST   Final Result   1. No evidence of pulmonary embolism. 2.  No airspace disease, pleural effusions, or pneumothoraces. Severe   emphysematous changes and pleuroparenchymal scarring. 3. Interval increase in irregular nodules in the lingula. Largest measures   20 mm. (Follow-up chest CT in 3 months is suggested to reassess this area.)      4. Essentially stable mediastinal lymphadenopathy which can also be   reassessed on recommended follow-up chest CT. XR CHEST PORTABLE   Final Result   Severe emphysematous changes with bibasilar linear airspace opacities,   potentially basis of scarring or of pulmonary edema. EKG:  This EKG is signed and interpreted by the EP. Time: 12:39  Rate: 67  Rhythm: Sinus  Interpretation: no acute changes; no ST changes  Comparison: stable as compared to patient's most recent EKG     ------------------------- NURSING NOTES AND VITALS REVIEWED ---------------------------   The nursing notes within the ED encounter and vital signs as below have been reviewed by myself. BP 91/63   Pulse 69   Temp 97.2 °F (36.2 °C) (Temporal)   Resp 18   Ht 5' 9\" (1.753 m)   Wt 208 lb (94.3 kg)   SpO2 94%   BMI 30.72 kg/m²   Oxygen Saturation Interpretation: Improved after treatment on NC    The patients available past medical records and past encounters were reviewed.         ------------------------------ ED COURSE/MEDICAL DECISION MAKING----------------------  Medications   aspirin tablet 325 mg (has no administration in time range)   iopamidol (ISOVUE-370) 76 % injection 75 mL (75 mLs Intravenous Given 10/15/20 2478)         ED COURSE:       Medical Decision Making:   Patient is a pleasant 62-year-old gentleman who comes in with increasing shortness of breath despite his 3 and half liters of oxygen at home he had a productive cough low-grade fever at home is a history of fungal as well as atypical bacterial lung infections. He feels similar to when he is been hospitalized in the past for this. We will get CT imaging of the chest as well as COVID testing blood cultures. Differential diagnosis includes atypical pneumonia bronchitis ACS MI dysrhythmia COVID. This patient has remained hemodynamically stable during their ED course. An EKG here showed normal sinus rhythm at 67 beats a minute no signs of any ST changes. CBC was normal.  Chemistry was normal, as well troponin was negative BNP was 351 lactic was normal Kovic test was negative he had a CTA of the chest read by radiology. This did not show any signs of pulmonary embolism there is no signs of any pleural effusions nor any clear infiltrates. Chronically stable mediastinal lymphadenopathy. She does report this intermittent chest pressure. I do feel he should be admitted for further evaluation and ACS rule out. He states he has not had a cardiac stress test recently. I spoke to Dr. Unruly Sal who admits to patient's PCP. Met the patient for monitored bed for ACS rule out and cardiac evaluation. She was given aspirin in the emergency department. Patient is asymptomatic on admission. no chest pain or shortness of breath on admission. Re-Evaluations:             Re-evaluation.   Patients symptoms are improving    Re-examination  10/15/20   12:51 PM EDT          Vital Signs:   Vitals:    10/15/20 1428 10/15/20 1529 10/15/20 1715 10/15/20 1735   BP: 100/65 105/74 91/63    Pulse: 62 66 67 69   Resp: 16 16 18    Temp:       TempSrc:       SpO2: 92% 93% 90% 94%   Weight:       Height: Consultations:          Spoke to Dr. Evelina Valdez, who will admit the patient his service monitored bed. Counseling: The emergency provider has spoken with the patient and discussed todays results, in addition to providing specific details for the plan of care and counseling regarding the diagnosis and prognosis. Questions are answered at this time and they are agreeable with the plan.       --------------------------------- IMPRESSION AND DISPOSITION ---------------------------------    IMPRESSION  1. Chest pain, unspecified type Stable       DISPOSITION  Disposition: Admit to telemetry  Patient condition is fair    NOTE: This report was transcribed using voice recognition software.  Every effort was made to ensure accuracy; however, inadvertent computerized transcription errors may be present        Laurel Coyle MD  10/15/20 3737

## 2020-10-15 NOTE — ED NOTES
[facility] faxed to [address] [place] [address], Spoke with Enmanuel Call to confirm it was received.       Kendall Finley RN  10/15/20 1914

## 2020-10-16 PROBLEM — B34.8 RHINOVIRUS INFECTION: Status: ACTIVE | Noted: 2020-10-16

## 2020-10-16 LAB
ALBUMIN SERPL-MCNC: 3.5 G/DL (ref 3.5–5.2)
ALP BLD-CCNC: 63 U/L (ref 40–129)
ALT SERPL-CCNC: 7 U/L (ref 0–40)
ANION GAP SERPL CALCULATED.3IONS-SCNC: 8 MMOL/L (ref 7–16)
AST SERPL-CCNC: 15 U/L (ref 0–39)
BASOPHILS ABSOLUTE: 0.02 E9/L (ref 0–0.2)
BASOPHILS RELATIVE PERCENT: 0.3 % (ref 0–2)
BILIRUB SERPL-MCNC: 0.3 MG/DL (ref 0–1.2)
BILIRUBIN DIRECT: <0.2 MG/DL (ref 0–0.3)
BILIRUBIN, INDIRECT: ABNORMAL MG/DL (ref 0–1)
BUN BLDV-MCNC: 11 MG/DL (ref 8–23)
C-REACTIVE PROTEIN: 1.8 MG/DL (ref 0–0.4)
CALCIUM SERPL-MCNC: 9.1 MG/DL (ref 8.6–10.2)
CHLORIDE BLD-SCNC: 105 MMOL/L (ref 98–107)
CHOLESTEROL, TOTAL: 145 MG/DL (ref 0–199)
CO2: 23 MMOL/L (ref 22–29)
CREAT SERPL-MCNC: 0.7 MG/DL (ref 0.7–1.2)
EOSINOPHILS ABSOLUTE: 0.02 E9/L (ref 0.05–0.5)
EOSINOPHILS RELATIVE PERCENT: 0.3 % (ref 0–6)
FOLATE: 8.4 NG/ML (ref 4.8–24.2)
GFR AFRICAN AMERICAN: >60
GFR NON-AFRICAN AMERICAN: >60 ML/MIN/1.73
GLUCOSE BLD-MCNC: 87 MG/DL (ref 74–99)
HBA1C MFR BLD: 5.5 % (ref 4–5.6)
HCT VFR BLD CALC: 51.8 % (ref 37–54)
HDLC SERPL-MCNC: 56 MG/DL
HEMOGLOBIN: 17.1 G/DL (ref 12.5–16.5)
IMMATURE GRANULOCYTES #: 0.01 E9/L
IMMATURE GRANULOCYTES %: 0.2 % (ref 0–5)
LACTIC ACID, SEPSIS: 0.9 MMOL/L (ref 0.5–1.9)
LDL CHOLESTEROL CALCULATED: 79 MG/DL (ref 0–99)
LV EF: 57 %
LVEF MODALITY: NORMAL
LYMPHOCYTES ABSOLUTE: 0.71 E9/L (ref 1.5–4)
LYMPHOCYTES RELATIVE PERCENT: 12.2 % (ref 20–42)
MAGNESIUM: 1.8 MG/DL (ref 1.6–2.6)
MCH RBC QN AUTO: 29.6 PG (ref 26–35)
MCHC RBC AUTO-ENTMCNC: 33 % (ref 32–34.5)
MCV RBC AUTO: 89.8 FL (ref 80–99.9)
MONOCYTES ABSOLUTE: 0.61 E9/L (ref 0.1–0.95)
MONOCYTES RELATIVE PERCENT: 10.5 % (ref 2–12)
NEUTROPHILS ABSOLUTE: 4.43 E9/L (ref 1.8–7.3)
NEUTROPHILS RELATIVE PERCENT: 76.5 % (ref 43–80)
PDW BLD-RTO: 15.7 FL (ref 11.5–15)
PHOSPHORUS: 2.6 MG/DL (ref 2.5–4.5)
PLATELET # BLD: 144 E9/L (ref 130–450)
PMV BLD AUTO: 9.7 FL (ref 7–12)
POTASSIUM SERPL-SCNC: 4 MMOL/L (ref 3.5–5)
PROCALCITONIN: 0.04 NG/ML (ref 0–0.08)
RBC # BLD: 5.77 E12/L (ref 3.8–5.8)
SEDIMENTATION RATE, ERYTHROCYTE: 0 MM/HR (ref 0–15)
SODIUM BLD-SCNC: 136 MMOL/L (ref 132–146)
TOTAL PROTEIN: 6.2 G/DL (ref 6.4–8.3)
TRIGL SERPL-MCNC: 48 MG/DL (ref 0–149)
TROPONIN: <0.01 NG/ML (ref 0–0.03)
TROPONIN: <0.01 NG/ML (ref 0–0.03)
TSH SERPL DL<=0.05 MIU/L-ACNC: 1.29 UIU/ML (ref 0.27–4.2)
URIC ACID, SERUM: 5.7 MG/DL (ref 3.4–7)
VITAMIN B-12: 319 PG/ML (ref 211–946)
VLDLC SERPL CALC-MCNC: 10 MG/DL
WBC # BLD: 5.8 E9/L (ref 4.5–11.5)

## 2020-10-16 PROCEDURE — 84484 ASSAY OF TROPONIN QUANT: CPT

## 2020-10-16 PROCEDURE — 93306 TTE W/DOPPLER COMPLETE: CPT

## 2020-10-16 PROCEDURE — 96372 THER/PROPH/DIAG INJ SC/IM: CPT

## 2020-10-16 PROCEDURE — 6360000002 HC RX W HCPCS: Performed by: INTERNAL MEDICINE

## 2020-10-16 PROCEDURE — 84145 PROCALCITONIN (PCT): CPT

## 2020-10-16 PROCEDURE — 80061 LIPID PANEL: CPT

## 2020-10-16 PROCEDURE — 2700000000 HC OXYGEN THERAPY PER DAY

## 2020-10-16 PROCEDURE — 36415 COLL VENOUS BLD VENIPUNCTURE: CPT

## 2020-10-16 PROCEDURE — 6370000000 HC RX 637 (ALT 250 FOR IP): Performed by: INTERNAL MEDICINE

## 2020-10-16 PROCEDURE — G0378 HOSPITAL OBSERVATION PER HR: HCPCS

## 2020-10-16 PROCEDURE — 87088 URINE BACTERIA CULTURE: CPT

## 2020-10-16 PROCEDURE — 82607 VITAMIN B-12: CPT

## 2020-10-16 PROCEDURE — 94640 AIRWAY INHALATION TREATMENT: CPT

## 2020-10-16 PROCEDURE — 80076 HEPATIC FUNCTION PANEL: CPT

## 2020-10-16 PROCEDURE — 85651 RBC SED RATE NONAUTOMATED: CPT

## 2020-10-16 PROCEDURE — 2580000003 HC RX 258: Performed by: INTERNAL MEDICINE

## 2020-10-16 PROCEDURE — 83605 ASSAY OF LACTIC ACID: CPT

## 2020-10-16 PROCEDURE — 83735 ASSAY OF MAGNESIUM: CPT

## 2020-10-16 PROCEDURE — 84550 ASSAY OF BLOOD/URIC ACID: CPT

## 2020-10-16 PROCEDURE — 83036 HEMOGLOBIN GLYCOSYLATED A1C: CPT

## 2020-10-16 PROCEDURE — 86140 C-REACTIVE PROTEIN: CPT

## 2020-10-16 PROCEDURE — 85025 COMPLETE CBC W/AUTO DIFF WBC: CPT

## 2020-10-16 PROCEDURE — 80048 BASIC METABOLIC PNL TOTAL CA: CPT

## 2020-10-16 PROCEDURE — 84100 ASSAY OF PHOSPHORUS: CPT

## 2020-10-16 PROCEDURE — 82746 ASSAY OF FOLIC ACID SERUM: CPT

## 2020-10-16 PROCEDURE — 84443 ASSAY THYROID STIM HORMONE: CPT

## 2020-10-16 RX ADMIN — ETHAMBUTOL HYDROCHLORIDE 800 MG: 400 TABLET ORAL at 08:10

## 2020-10-16 RX ADMIN — ENOXAPARIN SODIUM 40 MG: 40 INJECTION SUBCUTANEOUS at 08:10

## 2020-10-16 RX ADMIN — BENZOCAINE AND MENTHOL 1 LOZENGE: 15; 3.6 LOZENGE ORAL at 21:00

## 2020-10-16 RX ADMIN — BENZOCAINE AND MENTHOL 1 LOZENGE: 15; 3.6 LOZENGE ORAL at 14:35

## 2020-10-16 RX ADMIN — CLOTRIMAZOLE AND BETAMETHASONE DIPROPIONATE: 10; .5 LOTION TOPICAL at 21:00

## 2020-10-16 RX ADMIN — CLOTRIMAZOLE AND BETAMETHASONE DIPROPIONATE: 10; .5 LOTION TOPICAL at 08:09

## 2020-10-16 RX ADMIN — IBUPROFEN 200 MG: 200 TABLET, FILM COATED ORAL at 17:43

## 2020-10-16 RX ADMIN — GABAPENTIN 300 MG: 300 CAPSULE ORAL at 08:10

## 2020-10-16 RX ADMIN — SODIUM CHLORIDE: 9 INJECTION, SOLUTION INTRAVENOUS at 08:09

## 2020-10-16 RX ADMIN — IBUPROFEN 200 MG: 200 TABLET, FILM COATED ORAL at 08:10

## 2020-10-16 RX ADMIN — CLARITHROMYCIN 1000 MG: 500 TABLET ORAL at 20:59

## 2020-10-16 RX ADMIN — ARFORMOTEROL TARTRATE 15 MCG: 15 SOLUTION RESPIRATORY (INHALATION) at 21:05

## 2020-10-16 RX ADMIN — IBUPROFEN 200 MG: 200 TABLET, FILM COATED ORAL at 12:05

## 2020-10-16 RX ADMIN — ARFORMOTEROL TARTRATE 15 MCG: 15 SOLUTION RESPIRATORY (INHALATION) at 09:54

## 2020-10-16 RX ADMIN — IPRATROPIUM BROMIDE AND ALBUTEROL SULFATE 1 AMPULE: .5; 3 SOLUTION RESPIRATORY (INHALATION) at 12:32

## 2020-10-16 RX ADMIN — PANTOPRAZOLE SODIUM 40 MG: 40 TABLET, DELAYED RELEASE ORAL at 06:18

## 2020-10-16 RX ADMIN — IPRATROPIUM BROMIDE AND ALBUTEROL SULFATE 1 AMPULE: .5; 3 SOLUTION RESPIRATORY (INHALATION) at 09:54

## 2020-10-16 RX ADMIN — IPRATROPIUM BROMIDE AND ALBUTEROL SULFATE 1 AMPULE: .5; 3 SOLUTION RESPIRATORY (INHALATION) at 17:34

## 2020-10-16 RX ADMIN — GABAPENTIN 300 MG: 300 CAPSULE ORAL at 20:59

## 2020-10-16 RX ADMIN — RIFAMPIN 600 MG: 300 CAPSULE ORAL at 08:09

## 2020-10-16 RX ADMIN — IPRATROPIUM BROMIDE AND ALBUTEROL SULFATE 1 AMPULE: .5; 3 SOLUTION RESPIRATORY (INHALATION) at 21:05

## 2020-10-16 RX ADMIN — BUDESONIDE 250 MCG: 0.25 SUSPENSION RESPIRATORY (INHALATION) at 21:05

## 2020-10-16 RX ADMIN — BUDESONIDE 250 MCG: 0.25 SUSPENSION RESPIRATORY (INHALATION) at 09:54

## 2020-10-16 RX ADMIN — GABAPENTIN 300 MG: 300 CAPSULE ORAL at 14:35

## 2020-10-16 ASSESSMENT — PAIN DESCRIPTION - FREQUENCY: FREQUENCY: CONTINUOUS

## 2020-10-16 ASSESSMENT — PAIN DESCRIPTION - DESCRIPTORS
DESCRIPTORS: ACHING;DULL;CONSTANT
DESCRIPTORS: ACHING;DULL

## 2020-10-16 ASSESSMENT — PAIN - FUNCTIONAL ASSESSMENT: PAIN_FUNCTIONAL_ASSESSMENT: PREVENTS OR INTERFERES SOME ACTIVE ACTIVITIES AND ADLS

## 2020-10-16 ASSESSMENT — PAIN SCALES - GENERAL
PAINLEVEL_OUTOF10: 3
PAINLEVEL_OUTOF10: 0
PAINLEVEL_OUTOF10: 6
PAINLEVEL_OUTOF10: 0
PAINLEVEL_OUTOF10: 2

## 2020-10-16 ASSESSMENT — PAIN DESCRIPTION - ONSET: ONSET: ON-GOING

## 2020-10-16 ASSESSMENT — PAIN DESCRIPTION - ORIENTATION: ORIENTATION: MID

## 2020-10-16 ASSESSMENT — PAIN DESCRIPTION - PAIN TYPE
TYPE: CHRONIC PAIN
TYPE: CHRONIC PAIN

## 2020-10-16 ASSESSMENT — PAIN DESCRIPTION - LOCATION
LOCATION: BACK;CHEST
LOCATION: BACK

## 2020-10-16 ASSESSMENT — PAIN DESCRIPTION - PROGRESSION: CLINICAL_PROGRESSION: NOT CHANGED

## 2020-10-16 NOTE — H&P
History and Physical      CHIEF COMPLAINT: Chest pressure    History of Present Illness: 62-year-old male patient of Dr. Susie Nguyen I am asked to admit and follow. Patient states for last 2 to 3 days he has noticed a sensation of pressure on his sternum and most of the anterior chest.  He is chronically short of breath ; he noted slight increase despite 3.5 of oxygen at home. There was no radiation of the symptoms no worsening. He also had a somewhat productive cough. He had a temperature of 100.2 on 10/14/2020. --Patient follows with infectious disease for pulmonary Mycobacterium avium complex. He is on chronic clarithromycin ethambutol and rifampin 3 times weekly; that should continue until 1/2021. --Patient was seen by cardiology, stress test could not be performed today; likely in the a.m. Patient agrees to same. 2D echo completed but results pending. --Troponins negative x3.  proBNP on admission 351 slightly elevated. --Viral respiratory panel has resulted positive for human rhinovirus/enterovirus. --Procalcitonin normal 0.04. CRP slightly elevated 1.8. A1c 5.5 Free T4 0.97 TSH 1.290.  --Hemoglobin 17.1 sed rate 0. WC 5.8.   J01 folic acid normal.  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 clotrimazole-betamethasone (LOTRISONE) 1-0.05 % lotion, Apply topically 2 times daily. albuterol sulfate  (90 Base) MCG/ACT inhaler, Inhale 2 puffs into the lungs every 6 hours as needed for Wheezing or Shortness of Breath    Allergies:    Patient has no known allergies. Social History:    reports that he quit smoking about 6 years ago. His smoking use included cigarettes. He started smoking about 52 years ago. He has a 92.00 pack-year smoking history. He has never used smokeless tobacco. He reports that he does not drink alcohol or use drugs. Family History:   family history includes Other in his father and mother. REVIEW OF SYSTEMS:  As above in the HPI, otherwise negative    PHYSICAL EXAM:    VS: /70   Pulse 70   Temp 99.3 °F (37.4 °C) (Oral)   Resp 18   Ht 6' (1.829 m)   Wt 192 lb 8 oz (87.3 kg)   SpO2 90%   BMI 26.11 kg/m²     General appearance: Alert, Awake, Oriented times 3, no distress  Skin: Warm and dry ; no rashes  Head: Normocephalic. No masses, lesions or tenderness noted  Eyes: Conjunctivae pink, sclera white. PERRL,EOM-I  Ears: External ears normal  Nose/Sinuses: Nares normal. Septum midline. Mucosa normal. No drainage  Oropharynx: Oropharynx clear with no exudate seen  Neck: Supple. No jugular venous distension, lymphadenopathy or thyromegaly Trachea midline  Lungs: Mostly clear minimal wheeze  Heart: S1 S2  Regular rate and rhythm. No rub, murmur or gallop  Abdomen: Soft, non-tender. BS normal. No masses, organomegaly; no rebound or guarding  Extremities: No edema, Peripheral pulses palpable  Musculoskeletal: Muscular strength appears intact. Neuro:  No focal motor defects ; II-XII grossly intact .  GLASER equally  Breast: deferred  Rectal: deferred  Genitalia:  deferred    LABS:  CBC:   Lab Results   Component Value Date    WBC 5.8 10/16/2020    RBC 5.77 10/16/2020    HGB 17.1 10/16/2020    HCT 51.8 10/16/2020    MCV 89.8 10/16/2020    MCH 29.6 10/16/2020    MCHC 33.0 10/16/2020    RDW 15.7 10/16/2020     10/16/2020    MPV 9.7 10/16/2020     CBC with Differential:    Lab Results   Component Value Date    WBC 5.8 10/16/2020    RBC 5.77 10/16/2020    HGB 17.1 10/16/2020    HCT 51.8 10/16/2020     10/16/2020    MCV 89.8 10/16/2020    MCH 29.6 10/16/2020    MCHC 33.0 10/16/2020    RDW 15.7 10/16/2020    LYMPHOPCT 12.2 10/16/2020    MONOPCT 10.5 10/16/2020    BASOPCT 0.3 10/16/2020    MONOSABS 0.61 10/16/2020    LYMPHSABS 0.71 10/16/2020    EOSABS 0.02 10/16/2020    BASOSABS 0.02 10/16/2020     Hemoglobin/Hematocrit:    Lab Results   Component Value Date    HGB 17.1 10/16/2020    HCT 51.8 10/16/2020     CMP:    Lab Results   Component Value Date     10/16/2020    K 4.0 10/16/2020    K 3.8 10/02/2019     10/16/2020    CO2 23 10/16/2020    BUN 11 10/16/2020    CREATININE 0.7 10/16/2020    GFRAA >60 10/16/2020    LABGLOM >60 10/16/2020    GLUCOSE 87 10/16/2020    PROT 6.2 10/16/2020    LABALBU 3.5 10/16/2020    CALCIUM 9.1 10/16/2020    BILITOT 0.3 10/16/2020    ALKPHOS 63 10/16/2020    AST 15 10/16/2020    ALT 7 10/16/2020     BMP:    Lab Results   Component Value Date     10/16/2020    K 4.0 10/16/2020    K 3.8 10/02/2019     10/16/2020    CO2 23 10/16/2020    BUN 11 10/16/2020    LABALBU 3.5 10/16/2020    CREATININE 0.7 10/16/2020    CALCIUM 9.1 10/16/2020    GFRAA >60 10/16/2020    LABGLOM >60 10/16/2020    GLUCOSE 87 10/16/2020     Hepatic Function Panel:    Lab Results   Component Value Date    ALKPHOS 63 10/16/2020    ALT 7 10/16/2020    AST 15 10/16/2020    PROT 6.2 10/16/2020    BILITOT 0.3 10/16/2020    BILIDIR <0.2 10/16/2020    IBILI see below 10/16/2020    LABALBU 3.5 10/16/2020     Magnesium:    Lab Results   Component Value Date    MG 1.8 10/16/2020     Phosphorus:    Lab Results   Component Value Date    PHOS 2.6 10/16/2020     Uric Acid:    Lab Results   Component Value Date    LABURIC 5.7 10/16/2020     PT/INR:    Lab Results   Component Value Date    PROTIME 12.5 05/30/2019    INR 1.1 05/30/2019     Warfarin PT/INR:  No components found for: Elyssa Duarte  PTT:    Lab Results   Component Value Date    APTT 31.4 05/30/2019   [APTT}  Troponin:    Lab Results   Component Value Date    TROPONINI <0.01 10/16/2020     Last 3 Troponin:    Lab Results   Component Value Date    TROPONINI <0.01 10/16/2020    TROPONINI <0.01 10/16/2020    TROPONINI <0.01 10/15/2020     U/A:    Lab Results   Component Value Date    COLORU Yellow 10/02/2019    PROTEINU TRACE 10/02/2019    PHUR 5.0 10/02/2019    LABCAST FEW 10/02/2019    WBCUA 0-1 10/02/2019    RBCUA NONE 10/02/2019    MUCUS Present 04/22/2019    BACTERIA NONE 10/02/2019    CLARITYU Clear 10/02/2019    SPECGRAV >=1.030 10/02/2019    LEUKOCYTESUR Negative 10/02/2019    UROBILINOGEN 0.2 10/02/2019    BILIRUBINUR SMALL 10/02/2019    BLOODU Negative 10/02/2019    GLUCOSEU Negative 10/02/2019     HgBA1c:    Lab Results   Component Value Date    LABA1C 5.5 10/16/2020     FLP:    Lab Results   Component Value Date    TRIG 48 10/16/2020    HDL 56 10/16/2020    LDLCALC 79 10/16/2020    LABVLDL 10 10/16/2020     TSH:    Lab Results   Component Value Date    TSH 1.290 10/16/2020     VITAMIN B12: No components found for: B12  FOLATE:    Lab Results   Component Value Date    FOLATE 8.4 10/16/2020       RADIOLOGY:  CTA PULMONARY W CONTRAST   Final Result   1. No evidence of pulmonary embolism. 2.  No airspace disease, pleural effusions, or pneumothoraces. Severe   emphysematous changes and pleuroparenchymal scarring. 3. Interval increase in irregular nodules in the lingula. Largest measures   20 mm. (Follow-up chest CT in 3 months is suggested to reassess this area.)      4.   Essentially stable mediastinal lymphadenopathy which can also be   reassessed on recommended follow-up chest CT. XR CHEST PORTABLE   Final Result   Severe emphysematous changes with bibasilar linear airspace opacities,   potentially basis of scarring or of pulmonary edema.              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 ([facility] [address] [A31.2]    Glucose intolerance [E74.39]    Pulmonary emphysema with fibrosis of lung ([facility] [address] [J43.9, J84.10]    Thoracic ascending aortic aneurysm ([facility] [address] [I71.2]    Bullous emphysema ([facility] [address] [J43.9]    COPD (chronic obstructive pulmonary disease) ([facility] [address] [J44.9]       PLAN:  Medications discussed with patient  GI prophylaxis  DVT prophylaxis  Consultants notes reviewed  ID has signed off  Continue aerosol treatments  Stress test in a.m. continue rifampin Ethambutol, clarithromycin  DC IV fluids  Cepacol throat lozenges or equivalent        Please note that over 50 minutes was spent in evaluating the patient, review of records and results, discussion with staff/family, etc.    Ezra Jaimes DO  1:19 PM  10/16/2020    Voice recognition software use for dictation

## 2020-10-16 NOTE — PLAN OF CARE
Problem: Pain:  Goal: Pain level will decrease  Description: Pain level will decrease  Outcome: Met This Shift  Goal: Control of acute pain  Description: Control of acute pain  Outcome: Met This Shift  Goal: Control of chronic pain  Description: Control of chronic pain  Outcome: Met This Shift     Problem: Breathing Pattern - Ineffective:  Goal: Ability to achieve and maintain a regular respiratory rate will improve  Description: Ability to achieve and maintain a regular respiratory rate will improve  Outcome: Met This Shift

## 2020-10-16 NOTE — PROGRESS NOTES
[facility] notified of consult via the office. Dr Noe Marcelo notified of consult via [employer].  Jerel gonzales

## 2020-10-16 NOTE — CARE COORDINATION
Introduced my self and provided explanation of CM role to patient. Patient is awake, alert, and aware of current diagnosis and treatment plan including ID consult, stress testing. Patient resides at home with wife. He verbalizes that he is able to complete his adl's independently. He uses no walker, w/c, cane. He has O2 at home from [facility], base line liter flow is 3.5. His current liter flow is higher, will follow for new order if needed at [place] and route to [facility] as necessary. Patient is established with a pcp and denies any issue with retail pharmaceutical coverage. Patient denies need for [facility]. Explained ELOS of 24-48 hours; patient voiced understanding and agreement. Will follow along with  and assist with discharge planning as necessary. Elisha Fairbanks.  Gabriela, MSN, RN  [facility] Case Management  [phone]

## 2020-10-17 ENCOUNTER — APPOINTMENT (OUTPATIENT)
Dept: NUCLEAR MEDICINE | Age: 70
End: 2020-10-17
Payer: MEDICARE

## 2020-10-17 VITALS
RESPIRATION RATE: 16 BRPM | WEIGHT: 196.6 LBS | OXYGEN SATURATION: 91 % | TEMPERATURE: 97.8 F | HEART RATE: 85 BPM | DIASTOLIC BLOOD PRESSURE: 60 MMHG | BODY MASS INDEX: 26.63 KG/M2 | SYSTOLIC BLOOD PRESSURE: 100 MMHG | HEIGHT: 72 IN

## 2020-10-17 LAB
ALBUMIN SERPL-MCNC: 3.6 G/DL (ref 3.5–5.2)
ALP BLD-CCNC: 65 U/L (ref 40–129)
ALT SERPL-CCNC: 7 U/L (ref 0–40)
ANION GAP SERPL CALCULATED.3IONS-SCNC: 7 MMOL/L (ref 7–16)
AST SERPL-CCNC: 13 U/L (ref 0–39)
BASOPHILS ABSOLUTE: 0.01 E9/L (ref 0–0.2)
BASOPHILS RELATIVE PERCENT: 0.1 % (ref 0–2)
BILIRUB SERPL-MCNC: 0.4 MG/DL (ref 0–1.2)
BILIRUBIN DIRECT: <0.2 MG/DL (ref 0–0.3)
BILIRUBIN, INDIRECT: NORMAL MG/DL (ref 0–1)
BUN BLDV-MCNC: 9 MG/DL (ref 8–23)
C-REACTIVE PROTEIN: 4.1 MG/DL (ref 0–0.4)
CALCIUM SERPL-MCNC: 9.3 MG/DL (ref 8.6–10.2)
CHLORIDE BLD-SCNC: 104 MMOL/L (ref 98–107)
CO2: 24 MMOL/L (ref 22–29)
CREAT SERPL-MCNC: 0.7 MG/DL (ref 0.7–1.2)
EOSINOPHILS ABSOLUTE: 0.01 E9/L (ref 0.05–0.5)
EOSINOPHILS RELATIVE PERCENT: 0.1 % (ref 0–6)
GFR AFRICAN AMERICAN: >60
GFR NON-AFRICAN AMERICAN: >60 ML/MIN/1.73
GLUCOSE BLD-MCNC: 106 MG/DL (ref 74–99)
HCT VFR BLD CALC: 51.9 % (ref 37–54)
HEMOGLOBIN: 16.9 G/DL (ref 12.5–16.5)
IMMATURE GRANULOCYTES #: 0.01 E9/L
IMMATURE GRANULOCYTES %: 0.1 % (ref 0–5)
LV EF: 68 %
LVEF MODALITY: NORMAL
LYMPHOCYTES ABSOLUTE: 0.93 E9/L (ref 1.5–4)
LYMPHOCYTES RELATIVE PERCENT: 13.4 % (ref 20–42)
MAGNESIUM: 1.8 MG/DL (ref 1.6–2.6)
MCH RBC QN AUTO: 29.3 PG (ref 26–35)
MCHC RBC AUTO-ENTMCNC: 32.6 % (ref 32–34.5)
MCV RBC AUTO: 89.9 FL (ref 80–99.9)
MONOCYTES ABSOLUTE: 0.69 E9/L (ref 0.1–0.95)
MONOCYTES RELATIVE PERCENT: 9.9 % (ref 2–12)
NEUTROPHILS ABSOLUTE: 5.29 E9/L (ref 1.8–7.3)
NEUTROPHILS RELATIVE PERCENT: 76.4 % (ref 43–80)
PDW BLD-RTO: 15.7 FL (ref 11.5–15)
PHOSPHORUS: 2.4 MG/DL (ref 2.5–4.5)
PLATELET # BLD: 155 E9/L (ref 130–450)
PMV BLD AUTO: 10.2 FL (ref 7–12)
POTASSIUM SERPL-SCNC: 4 MMOL/L (ref 3.5–5)
RBC # BLD: 5.77 E12/L (ref 3.8–5.8)
SEDIMENTATION RATE, ERYTHROCYTE: 1 MM/HR (ref 0–15)
SODIUM BLD-SCNC: 135 MMOL/L (ref 132–146)
TOTAL PROTEIN: 6.5 G/DL (ref 6.4–8.3)
WBC # BLD: 6.9 E9/L (ref 4.5–11.5)

## 2020-10-17 PROCEDURE — 6360000002 HC RX W HCPCS: Performed by: INTERNAL MEDICINE

## 2020-10-17 PROCEDURE — A9500 TC99M SESTAMIBI: HCPCS | Performed by: RADIOLOGY

## 2020-10-17 PROCEDURE — 83735 ASSAY OF MAGNESIUM: CPT

## 2020-10-17 PROCEDURE — 85025 COMPLETE CBC W/AUTO DIFF WBC: CPT

## 2020-10-17 PROCEDURE — 6370000000 HC RX 637 (ALT 250 FOR IP): Performed by: INTERNAL MEDICINE

## 2020-10-17 PROCEDURE — 80048 BASIC METABOLIC PNL TOTAL CA: CPT

## 2020-10-17 PROCEDURE — 2700000000 HC OXYGEN THERAPY PER DAY

## 2020-10-17 PROCEDURE — 3430000000 HC RX DIAGNOSTIC RADIOPHARMACEUTICAL: Performed by: RADIOLOGY

## 2020-10-17 PROCEDURE — 96372 THER/PROPH/DIAG INJ SC/IM: CPT

## 2020-10-17 PROCEDURE — 78452 HT MUSCLE IMAGE SPECT MULT: CPT

## 2020-10-17 PROCEDURE — G0378 HOSPITAL OBSERVATION PER HR: HCPCS

## 2020-10-17 PROCEDURE — 94640 AIRWAY INHALATION TREATMENT: CPT

## 2020-10-17 PROCEDURE — 80076 HEPATIC FUNCTION PANEL: CPT

## 2020-10-17 PROCEDURE — 86140 C-REACTIVE PROTEIN: CPT

## 2020-10-17 PROCEDURE — 36415 COLL VENOUS BLD VENIPUNCTURE: CPT

## 2020-10-17 PROCEDURE — 93017 CV STRESS TEST TRACING ONLY: CPT

## 2020-10-17 PROCEDURE — 85651 RBC SED RATE NONAUTOMATED: CPT

## 2020-10-17 PROCEDURE — 84100 ASSAY OF PHOSPHORUS: CPT

## 2020-10-17 RX ADMIN — IBUPROFEN 200 MG: 200 TABLET, FILM COATED ORAL at 11:14

## 2020-10-17 RX ADMIN — IPRATROPIUM BROMIDE AND ALBUTEROL SULFATE 1 AMPULE: .5; 3 SOLUTION RESPIRATORY (INHALATION) at 12:01

## 2020-10-17 RX ADMIN — PANTOPRAZOLE SODIUM 40 MG: 40 TABLET, DELAYED RELEASE ORAL at 06:30

## 2020-10-17 RX ADMIN — ARFORMOTEROL TARTRATE 15 MCG: 15 SOLUTION RESPIRATORY (INHALATION) at 08:10

## 2020-10-17 RX ADMIN — IPRATROPIUM BROMIDE AND ALBUTEROL SULFATE 1 AMPULE: .5; 3 SOLUTION RESPIRATORY (INHALATION) at 08:10

## 2020-10-17 RX ADMIN — BUDESONIDE 250 MCG: 0.25 SUSPENSION RESPIRATORY (INHALATION) at 08:10

## 2020-10-17 RX ADMIN — GABAPENTIN 300 MG: 300 CAPSULE ORAL at 11:13

## 2020-10-17 RX ADMIN — Medication 10 MILLICURIE: at 08:35

## 2020-10-17 RX ADMIN — ENOXAPARIN SODIUM 40 MG: 40 INJECTION SUBCUTANEOUS at 11:13

## 2020-10-17 RX ADMIN — REGADENOSON 0.4 MG: 0.08 INJECTION, SOLUTION INTRAVENOUS at 09:45

## 2020-10-17 RX ADMIN — Medication 30 MILLICURIE: at 09:50

## 2020-10-17 ASSESSMENT — PAIN SCALES - GENERAL: PAINLEVEL_OUTOF10: 0

## 2020-10-17 NOTE — PROGRESS NOTES
Oxygen level room air resting 84%  3.5 liters nasal cannula resting 86%, ambulation 85%  5 liters nasal cannula 92% resting, 91% ambulation

## 2020-10-17 NOTE — PROGRESS NOTES
Spoke with Dr. Ravinder Hussein regarding stress, results, patient updated that he will need close follow up and the possibility of a catheretization in the near future.  Patient educated on need to return to ED with any chest pain

## 2020-10-17 NOTE — PROGRESS NOTES
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 *      SUBJECTIVE:  Alan Jain states he feels better today but has a thick mucinous cough and denies any further chest discomfort other than soreness associated with his coughing. Pharmacologic nuclear stress completed without reproducibility of symptoms. REVIEW OF SYSTEMS:  General ROS: negative for - fatigue, malaise,  weight gain or weight loss  Psychological ROS: negative for - anxiety , depression  Ophthalmic ROS: negative for - decreased vision or visual distortion. ENT ROS: negative  Allergy and Immunology ROS: negative  Hematological and Lymphatic ROS: negative  Endocrine: no heat or cold intolerance and no polyphagia, polydipsia, or polyuria  Respiratory ROS: positive for - cough, shortness of breath, sputum changes and wheezing  Cardiovascular ROS: positive for - chest pain, dyspnea on exertion and shortness of breath. Gastrointestinal ROS: no abdominal pain, change in bowel habits, or black or bloody stools  Genito-Urinary ROS: no nocturia, dysuria, trouble voiding, frequency or hematuria  Musculoskeletal ROS: negative for- myalgias, arthralgias, or claudication  Neurological ROS: no TIA or stroke symptoms otherwise no significant change in symptoms or problems since yesterday as documented in previous progress notes.     SCHEDULED MEDICATIONS:   clarithromycin  1,000 mg Oral Once per day on Mon Wed Fri    clotrimazole-betamethasone   Topical BID    ethambutol  800 mg Oral Once per day on Mon Wed Fri    rifAMPin  600 mg Oral Once per day on Mon Wed Fri    gabapentin  600 mg Oral Nightly    gabapentin  300 mg Oral BID    ibuprofen  200 mg Oral TID WC    Arformoterol Tartrate  15 mcg Nebulization BID    budesonide  250 mcg Nebulization BID    ipratropium-albuterol  1 ampule Inhalation Q4H WA    enoxaparin  40 mg Subcutaneous Daily    pantoprazole  40 mg Oral QAM AC       VITAL SIGNS:                                                                                                                          /60   Pulse 85   Temp 97.8 °F (36.6 °C) (Oral)   Resp 16   Ht 6' (1.829 m)   Wt 196 lb 9.6 oz (89.2 kg)   SpO2 90%   BMI 26.66 kg/m²   Patient Vitals for the past 96 hrs (Last 3 readings):   Weight   10/17/20 0455 196 lb 9.6 oz (89.2 kg)   10/16/20 0626 192 lb 8 oz (87.3 kg)   10/15/20 2030 196 lb 3.2 oz (89 kg)     OBJECTIVE:    HEENT: PERRL, EOM  Intact; sclera non-icteric, conjunctiva pink. Carotids are brisk in upstroke with normal contour. No carotid bruits. Normal jugular venous pulsation at 45°. No palpable cervical nor supraclavicular nodes. Thyroid not palpable. Trachea midline. Chest: Even excursion  Lungs: Scattered left-sided expiratory rhonchi, no expiratory wheezes no decreased tactile fremitus without inspiratory rales. Heart: Regular  rhythm; S1 > S2, no gallop or murmur. No clicks, rub, palpable thrills   or heaves. PMI nondisplaced, 5th intercostal space MCL. Abdomen: Soft, nontender, nondistended,  mildly protuberant, no masses or organomegaly. Bowel sounds active. Extremities: Without clubbing, cyanosis or edema. Pulses present 3+ upper extermities bilaterally; present 1+ DP and present 1+ PT bilaterally.      Data:   Scheduled Meds: Reviewed  Continuous Infusions:     Intake/Output Summary (Last 24 hours) at 10/17/2020 1158  Last data filed at 10/17/2020 0455  Gross per 24 hour   Intake 828 ml   Output 1675 ml   Net -847 ml     CBC:   Recent Labs edema. Cta Pulmonary W Contrast    Result Date: 10/15/2020  EXAMINATION: CTA OF THE CHEST 10/15/2020 3:02 pm TECHNIQUE: CTA of the chest was performed after the administration of intravenous contrast.  Multiplanar reformatted images are provided for review. MIP images are provided for review. Dose modulation, iterative reconstruction, and/or weight based adjustment of the mA/kV was utilized to reduce the radiation dose to as low as reasonably achievable. COMPARISON: CT chest from October 6, 2019 HISTORY: ORDERING SYSTEM PROVIDED HISTORY: r/o PE vs pneumonia TECHNOLOGIST PROVIDED HISTORY: Reason for exam:->r/o PE vs pneumonia FINDINGS: The thyroid gland and remaining soft tissue structures of the neck appear unremarkable. No concerning axillary adenopathy. The anterior and posterior chest wall appear unremarkable. There are multiple prominent mediastinal lymph nodes which are not significantly changed from prior exam reference lymph node in the precarinal region series 302 image 111 measures 14 mm (previously 12 mm. Pretracheal lymph node series 302, image 82 measures 11 mm in short axis (previously 11 mm) Normal appearance of the esophagus. Small hiatal hernia. Normal appearance of the thoracic aorta. No aneurysmal dilatation. Main pulmonary artery is of normal size. No thromboembolic phenomenon identified within the pulmonary arterial system. Heart chambers are not enlarged. No pericardial effusion. Mild left coronary distribution coronary artery disease. Airway appears patent. No endobronchial lesions. There is mild bilateral lower lung airway thickening suggestive of chronic inflammation. Severe cystic lung parenchymal changes and bulla formation which are not significantly changed from prior exam.  The distribution and appearance of bilateral reticular opacities is similar to prior exam and are most consistent with scarring/fibrotic change. No pleural effusion or pneumothorax.  Posterior irregular nodularity series 301, image 34 measures approximately 9 mm and is unchanged from prior exam. Lingular nodularity series 301, image 96 measures 20 mm and 16 mm respectively. Both of these appear to have increased in the interim between exams. Focal area of pleural thickening in the medial anterior left upper lung series 301, image 65 has decreased in size. Upper abdomen is unremarkable. Thoracic vertebral body height and alignment is maintained. Multilevel endplate spondylosis. 1.  No evidence of pulmonary embolism. 2.  No airspace disease, pleural effusions, or pneumothoraces. Severe emphysematous changes and pleuroparenchymal scarring. 3. Interval increase in irregular nodules in the lingula. Largest measures 20 mm. (Follow-up chest CT in 3 months is suggested to reassess this area.) 4. Essentially stable mediastinal lymphadenopathy which can also be reassessed on recommended follow-up chest CT. 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 *      RECOMMENDATIONS:  Awaiting completion of second set of perfusion images and will review and comment as to whether there is significant underlying ischemia associated with the symptoms. Presently to continue his present medical management.     I have spent more than 25 minutes face to face with Milo Haskins and reviewing notes and laboratory data, with greater than 50% of this time instructing and counseling the patient face to face regarding my findings and recommendations and I have answered all questions as posed to me by Mr. Rajwinder Lawrence. Joey Delarosa, DO FACP,FACC,FSCAI      NOTE:  This report was transcribed using voice recognition software.   Every effort was made to ensure accuracy; however, inadvertent computerized transcription errors may be present

## 2020-10-17 NOTE — PROGRESS NOTES
Pharm stress test completed with physician, RN, nuclear medicine staff and patient wearing procedure masks.

## 2020-10-17 NOTE — PROGRESS NOTES
PROGRESS  NOTE --                                                          INTERNAL  MEDICINE                                                                              I  PERSONALLY SAW , EXAMINED, AND CARED [address], 10/17/2020     LABS, XRAY ,CHART, AND MEDICATIONS  REVIEWED BY ME       Chief complaint: Chest pressure      10/17/2020-SUBJECTIVE: Ramon Newby is alert awake and cooperative; oriented ×3. Denies any chest pain dyspnea nausea emesis. Tolerating diet. No abdominal pain. He is the same dyspnea that he is had for years no worsening. Sore throat mildly better. Afebrile last 24 hours. SPO2 90 on 5.5 L nasal cannula. Intake and output -95 cc. Glucose 106 phosphorus 2.4 hemoglobin 16.9 WBC 6.9. CRP 4.1. Troponins negative x3 pro calcitonin 0.04. A1c 5.5 TSH 1.290. Blood and urine cultures negative to date. Viral respiratory panel resulted with rhinovirus/enterovirus infection. Patient was seen today by cardiology; underwent stress test.  Nuclear pictures still pending.   There was no reproducible symptoms on the stress test.    Objective:     PHYSICAL EXAM:    VS: /60   Pulse 85   Temp 97.8 °F (36.6 °C) (Oral)   Resp 16   Ht 6' (1.829 m)   Wt 196 lb 9.6 oz (89.2 kg)   SpO2 90%   BMI 26.66 kg/m²     Labs:   CBC:   Lab Results   Component Value Date    WBC 6.9 10/17/2020    RBC 5.77 10/17/2020    HGB 16.9 10/17/2020    HCT 51.9 10/17/2020    MCV 89.9 10/17/2020    MCH 29.3 10/17/2020    MCHC 32.6 10/17/2020    RDW 15.7 10/17/2020     10/17/2020    MPV 10.2 10/17/2020     CBC with Differential:    Lab Results   Component Value Date    WBC 6.9 10/17/2020    RBC 5.77 10/17/2020    HGB 16.9 10/17/2020    HCT 51.9 10/17/2020     10/17/2020    MCV 89.9 10/17/2020    MCH 29.3 10/17/2020    MCHC 32.6 10/17/2020    RDW 15.7 10/17/2020    LYMPHOPCT 13.4 10/17/2020    MONOPCT 9.9 10/17/2020 BASOPCT 0.1 10/17/2020    MONOSABS 0.69 10/17/2020    LYMPHSABS 0.93 10/17/2020    EOSABS 0.01 10/17/2020    BASOSABS 0.01 10/17/2020     Hemoglobin/Hematocrit:    Lab Results   Component Value Date    HGB 16.9 10/17/2020    HCT 51.9 10/17/2020     CMP:    Lab Results   Component Value Date     10/17/2020    K 4.0 10/17/2020    K 3.8 10/02/2019     10/17/2020    CO2 24 10/17/2020    BUN 9 10/17/2020    CREATININE 0.7 10/17/2020    GFRAA >60 10/17/2020    LABGLOM >60 10/17/2020    GLUCOSE 106 10/17/2020    PROT 6.5 10/17/2020    LABALBU 3.6 10/17/2020    CALCIUM 9.3 10/17/2020    BILITOT 0.4 10/17/2020    ALKPHOS 65 10/17/2020    AST 13 10/17/2020    ALT 7 10/17/2020     BMP:    Lab Results   Component Value Date     10/17/2020    K 4.0 10/17/2020    K 3.8 10/02/2019     10/17/2020    CO2 24 10/17/2020    BUN 9 10/17/2020    LABALBU 3.6 10/17/2020    CREATININE 0.7 10/17/2020    CALCIUM 9.3 10/17/2020    GFRAA >60 10/17/2020    LABGLOM >60 10/17/2020    GLUCOSE 106 10/17/2020     Hepatic Function Panel:    Lab Results   Component Value Date    ALKPHOS 65 10/17/2020    ALT 7 10/17/2020    AST 13 10/17/2020    PROT 6.5 10/17/2020    BILITOT 0.4 10/17/2020    BILIDIR <0.2 10/17/2020    IBILI see below 10/17/2020    LABALBU 3.6 10/17/2020     Ionized Calcium:  No results found for: IONCA  Magnesium:    Lab Results   Component Value Date    MG 1.8 10/17/2020     Phosphorus:    Lab Results   Component Value Date    PHOS 2.4 10/17/2020     LDH:  No results found for: LDH  Uric Acid:    Lab Results   Component Value Date    LABURIC 5.7 10/16/2020     PT/INR:    Lab Results   Component Value Date    PROTIME 12.5 05/30/2019    INR 1.1 05/30/2019     Warfarin PT/INR:  No components found for: PTPATWAR, PTINRWAR  PTT:    Lab Results   Component Value Date    APTT 31.4 05/30/2019   [APTT}  Troponin:    Lab Results   Component Value Date    TROPONINI <0.01 10/16/2020     Last 3 Troponin:    Lab Results Component Value Date    TROPONINI <0.01 10/16/2020    TROPONINI <0.01 10/16/2020    TROPONINI <0.01 10/15/2020     U/A:    Lab Results   Component Value Date    COLORU Yellow 10/02/2019    PROTEINU TRACE 10/02/2019    PHUR 5.0 10/02/2019    LABCAST FEW 10/02/2019    WBCUA 0-1 10/02/2019    RBCUA NONE 10/02/2019    MUCUS Present 04/22/2019    BACTERIA NONE 10/02/2019    CLARITYU Clear 10/02/2019    SPECGRAV >=1.030 10/02/2019    LEUKOCYTESUR Negative 10/02/2019    UROBILINOGEN 0.2 10/02/2019    BILIRUBINUR SMALL 10/02/2019    BLOODU Negative 10/02/2019    GLUCOSEU Negative 10/02/2019     HgBA1c:    Lab Results   Component Value Date    LABA1C 5.5 10/16/2020     FLP:    Lab Results   Component Value Date    TRIG 48 10/16/2020    HDL 56 10/16/2020    LDLCALC 79 10/16/2020    LABVLDL 10 10/16/2020     TSH:    Lab Results   Component Value Date    TSH 1.290 10/16/2020     VITAMIN B12: No components found for: B12  FOLATE:    Lab Results   Component Value Date    FOLATE 8.4 10/16/2020        General appearance: Alert, Awake, Oriented times 3, no distress  Skin: Warm and dry ; no rashes  Head: Normocephalic. No masses, lesions or tenderness noted  Eyes: Conjunctivae pink, sclera white. PERRL,EOM-I  Ears: External ears normal  Nose/Sinuses: Nares normal. Septum midline. Mucosa normal. No drainage  Oropharynx: Oropharynx clear with no exudate seen  Neck: Supple. No jugular venous distension, lymphadenopathy or thyromegaly Trachea midline  Lungs: Mostly clear minimal wheeze  Heart: S1 S2  Regular rate and rhythm. No rub, murmur or gallop  Abdomen: Soft, non-tender. BS normal. No masses, organomegaly; no rebound or guarding  Extremities: No edema, Peripheral pulses palpable  Musculoskeletal: Muscular strength appears intact. Neuro:  No focal motor defects ; II-XII grossly intact .  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 *      PLAN:  SEE ORDERS      RE  CHANGES AND FINDINGS   Medications reviewed with patient  GI prophylaxis  DVT prophylaxis  Consultants notes reviewed   Med reconciliation completed  Prescriptions written  Follow-up Dr. Ashlee De Luna 1 week  Discussed at length rhinovirus/enterovirus may be causing some of his dyspnea  Continue rifampin and ethambutol as previously as well as clarithromycin. Continue nebulizers at home  Follow-up , pulmonary, per his recommendation  Follow-up ID per their instructions      Discussed with patient and nursing.       Ezra Jaimes DO     1:07 PM     10/17/2020    TIME > 35 MINUTES    >  50 %  OF  TIME  DISCUSSION               ------------  INFORMATION  -----------      DIET:DIET CARDIAC;      No Known Allergies      MEDICATION SIDE EFFECTS:none       SCHEDULED MEDS:                                 Scheduled Meds:   clarithromycin  1,000 mg Oral Once per day on Mon Wed Fri    clotrimazole-betamethasone   Topical BID    ethambutol  800 mg Oral Once per day on Mon Wed Fri    rifAMPin  600 mg Oral Once per day on Mon Wed Fri    gabapentin  600 mg Oral Nightly    gabapentin  300 mg Oral BID    ibuprofen  200 mg Oral TID     Arformoterol Tartrate  15 mcg Nebulization BID    budesonide  250 mcg Nebulization BID    ipratropium-albuterol  1 ampule Inhalation Q4H WA    enoxaparin  40 mg Subcutaneous Daily    pantoprazole  40 mg Oral QAM AC       Continuous Infusions:      Data:       Intake/Output Summary (Last 24 hours) at 10/17/2020 1307  Last data filed at 10/17/2020 0455  Gross per 24 hour   Intake 828 ml   Output 1675 ml   Net -847 ml       Wt Readings from Last 3 Encounters:   10/17/20 196 lb 9.6 oz (89.2 kg)   10/12/20 208 lb (94.3 kg)   03/13/19 201 lb (91.2 kg)       Labs: Additional    GLUCOSE:No results for input(s): POCGLU in the last 72 hours. BNP:No results found for: BNP    CRP:   Recent Labs     10/16/20  0040 10/17/20  0420   CRP 1.8* 4.1*       ESR:  Recent Labs     10/16/20  0615 10/17/20  0420   SEDRATE 0 1       RADIOLOGY: REVIEWED AVAILABLE REPORT  CTA PULMONARY W CONTRAST   Final Result   1. No evidence of pulmonary embolism. 2.  No airspace disease, pleural effusions, or pneumothoraces. Severe   emphysematous changes and pleuroparenchymal scarring. 3. Interval increase in irregular nodules in the lingula. Largest measures   20 mm. (Follow-up chest CT in 3 months is suggested to reassess this area.)      4. Essentially stable mediastinal lymphadenopathy which can also be   reassessed on recommended follow-up chest CT. XR CHEST PORTABLE   Final Result   Severe emphysematous changes with bibasilar linear airspace opacities,   potentially basis of scarring or of pulmonary edema.          NM Cardiac Stress Test Nuclear Imaging    (Results Pending)             July Jaimes DO   1:07 PM     10/17/2020      Voice recognition software used for dictation

## 2020-10-17 NOTE — DISCHARGE SUMMARY
today would like something for it    10/17/2020-SUBJECTIVE: Geena Maddox is alert awake and cooperative; oriented ×3. Denies any chest pain dyspnea nausea emesis. Tolerating diet. No abdominal pain. He is the same dyspnea that he is had for years no worsening. Sore throat mildly better. Afebrile last 24 hours. SPO2 90 on 5.5 L nasal cannula. Intake and output -95 cc. Glucose 106 phosphorus 2.4 hemoglobin 16.9 WBC 6.9. CRP 4.1. Troponins negative x3 pro calcitonin 0.04. A1c 5.5 TSH 1.290. Blood and urine cultures negative to date. Viral respiratory panel resulted with rhinovirus/enterovirus infection. Patient was seen today by cardiology; underwent stress test.  Nuclear pictures still pending. There was no reproducible symptoms on the stress test.  2D echo results--    Summary   No evidence of tricuspid regurgitation. Left ventricle grossly normal in size. Normal left ventricular wall thickness. Estimated left ventricular ejection fraction is 57±5%. Septal hypokinesis - borderline. <50% criteria for diastolic dysfunction. The LAESV Index is <34ml/m2. Moderately dilated right ventricle. Right ventricle global systolic function is low normal .   Physiologic and/or trace mitral regurgitation is present. Trace aortic regurgitation is noted. Unable to accurately assess RV systolic pressure. Physiologic and/or trace pulmonic regurgitation present. Technically good quality study. No comparison study available. Suggest clinical correlation. Instructions at discharge:    RE  CHANGES AND FINDINGS   Medications reviewed with patient  GI prophylaxis  DVT prophylaxis  Consultants notes reviewed   Med reconciliation completed  Prescriptions written  Follow-up Dr. Heidi Nath 1 week  Discussed at length rhinovirus/enterovirus may be causing some of his dyspnea  Continue rifampin and ethambutol as previously as well as clarithromycin.   Continue nebulizers at home  Follow-up Aquiles Camejo, RBC 5.77 10/17/2020    HGB 16.9 10/17/2020    HCT 51.9 10/17/2020    MCV 89.9 10/17/2020    MCH 29.3 10/17/2020    MCHC 32.6 10/17/2020    RDW 15.7 10/17/2020     10/17/2020    MPV 10.2 10/17/2020     CBC with Differential:    Lab Results   Component Value Date    WBC 6.9 10/17/2020    RBC 5.77 10/17/2020    HGB 16.9 10/17/2020    HCT 51.9 10/17/2020     10/17/2020    MCV 89.9 10/17/2020    MCH 29.3 10/17/2020    MCHC 32.6 10/17/2020    RDW 15.7 10/17/2020    LYMPHOPCT 13.4 10/17/2020    MONOPCT 9.9 10/17/2020    BASOPCT 0.1 10/17/2020    MONOSABS 0.69 10/17/2020    LYMPHSABS 0.93 10/17/2020    EOSABS 0.01 10/17/2020    BASOSABS 0.01 10/17/2020     Hemoglobin/Hematocrit:    Lab Results   Component Value Date    HGB 16.9 10/17/2020    HCT 51.9 10/17/2020     CMP:    Lab Results   Component Value Date     10/17/2020    K 4.0 10/17/2020    K 3.8 10/02/2019     10/17/2020    CO2 24 10/17/2020    BUN 9 10/17/2020    CREATININE 0.7 10/17/2020    GFRAA >60 10/17/2020    LABGLOM >60 10/17/2020    GLUCOSE 106 10/17/2020    PROT 6.5 10/17/2020    LABALBU 3.6 10/17/2020    CALCIUM 9.3 10/17/2020    BILITOT 0.4 10/17/2020    ALKPHOS 65 10/17/2020    AST 13 10/17/2020    ALT 7 10/17/2020     BMP:    Lab Results   Component Value Date     10/17/2020    K 4.0 10/17/2020    K 3.8 10/02/2019     10/17/2020    CO2 24 10/17/2020    BUN 9 10/17/2020    LABALBU 3.6 10/17/2020    CREATININE 0.7 10/17/2020    CALCIUM 9.3 10/17/2020    GFRAA >60 10/17/2020    LABGLOM >60 10/17/2020    GLUCOSE 106 10/17/2020     Hepatic Function Panel:    Lab Results   Component Value Date    ALKPHOS 65 10/17/2020    ALT 7 10/17/2020    AST 13 10/17/2020    PROT 6.5 10/17/2020    BILITOT 0.4 10/17/2020    BILIDIR <0.2 10/17/2020    IBILI see below 10/17/2020    LABALBU 3.6 10/17/2020     Ionized Calcium:  No results found for: IONCA  Magnesium:    Lab Results   Component Value Date    MG 1.8 10/17/2020     Phosphorus: Lab Results   Component Value Date    PHOS 2.4 10/17/2020     LDH:  No results found for: LDH  Uric Acid:    Lab Results   Component Value Date    LABURIC 5.7 10/16/2020     PT/INR:    Lab Results   Component Value Date    PROTIME 12.5 05/30/2019    INR 1.1 05/30/2019     Warfarin PT/INR:  No components found for: PTPATWAR, PTINRWAR  PTT:    Lab Results   Component Value Date    APTT 31.4 05/30/2019   [APTT}  Troponin:    Lab Results   Component Value Date    TROPONINI <0.01 10/16/2020     Last 3 Troponin:    Lab Results   Component Value Date    TROPONINI <0.01 10/16/2020    TROPONINI <0.01 10/16/2020    TROPONINI <0.01 10/15/2020     U/A:    Lab Results   Component Value Date    COLORU Yellow 10/02/2019    PROTEINU TRACE 10/02/2019    PHUR 5.0 10/02/2019    LABCAST FEW 10/02/2019    WBCUA 0-1 10/02/2019    RBCUA NONE 10/02/2019    MUCUS Present 04/22/2019    BACTERIA NONE 10/02/2019    CLARITYU Clear 10/02/2019    SPECGRAV >=1.030 10/02/2019    LEUKOCYTESUR Negative 10/02/2019    UROBILINOGEN 0.2 10/02/2019    BILIRUBINUR SMALL 10/02/2019    BLOODU Negative 10/02/2019    GLUCOSEU Negative 10/02/2019     HgBA1c:    Lab Results   Component Value Date    LABA1C 5.5 10/16/2020     FLP:    Lab Results   Component Value Date    TRIG 48 10/16/2020    HDL 56 10/16/2020    LDLCALC 79 10/16/2020    LABVLDL 10 10/16/2020     TSH:    Lab Results   Component Value Date    TSH 1.290 10/16/2020     VITAMIN B12: No components found for: B12  FOLATE:    Lab Results   Component Value Date    FOLATE 8.4 10/16/2020          CBC:  Recent Labs     10/15/20  1315 10/16/20  0615 10/17/20  0420   WBC 4.1* 5.8 6.9   RBC 5.85* 5.77 5.77   HGB 17.3* 17.1* 16.9*   HCT 53.3 51.8 51.9    144 155   MCV 91.1 89.8 89.9   MCH 29.6 29.6 29.3   MCHC 32.5 33.0 32.6   RDW 15.4* 15.7* 15.7*   LYMPHOPCT 16.0* 12.2* 13.4*   MONOPCT 13.1* 10.5 9.9   BASOPCT 0.5 0.3 0.1   MONOSABS 0.53 0.61 0.69   LYMPHSABS 0.65* 0.71* 0.93*   EOSABS 0.01* 0.02* 0.01*   BASOSABS 0.02 0.02 0.01          H & H :  Recent Labs     10/15/20  1315 10/16/20  0615 10/17/20  0420   HGB 17.3* 17.1* 16.9*       TSH:  Recent Labs     10/16/20  0615   TSH 1.290       GLUCOSE:No results for input(s): POCGLU in the last 72 hours. CMP:  Recent Labs     10/15/20  1315 10/16/20  0615 10/17/20  0420    136 135   K 3.6 4.0 4.0    105 104   CO2 24 23 24   BUN 10 11 9   CREATININE 0.9 0.7 0.7   GFRAA >60 >60 >60   LABGLOM >60 >60 >60   GLUCOSE 96 87 106*   PROT 6.7 6.2* 6.5   LABALBU 3.8 3.5 3.6   CALCIUM 9.5 9.1 9.3   BILITOT 0.4 0.3 0.4   ALKPHOS 71 63 65   AST 15 15 13   ALT 7 7 7         BNP:No results found for: BNP    PROTIME/INR:No results for input(s): PROTIME, INR in the last 72 hours. CRP:   Recent Labs     10/16/20  0040 10/17/20  0420   CRP 1.8* 4.1*       ESR:  Recent Labs     10/16/20  0615 10/17/20  0420   SEDRATE 0 1       LIPASE , AMYLASE:  Lab Results   Component Value Date    LIPASE 16 05/30/2019      No results found for: AMYLASE    ABGs: No results found for: Drenda Ace, [id number]    CARDIAC:   Recent Labs     10/15/20  1315 10/16/20  0040 10/16/20  0359   TROPONINI <0.01 <0.01 <0.01       Lipid Profile:   Lab Results   Component Value Date    TRIG 48 10/16/2020    HDL 56 10/16/2020    LDLCALC 79 10/16/2020    CHOL 145 10/16/2020        Xr Chest Portable    Result Date: 10/15/2020  EXAMINATION: ONE XRAY VIEW OF THE CHEST 10/15/2020 1:24 pm COMPARISON: October 2, 2019. Correlated with October 6, 2019 CT of the chest HISTORY: ORDERING SYSTEM PROVIDED HISTORY: Shortness of breath TECHNOLOGIST PROVIDED HISTORY: Reason for exam:->Shortness of breath FINDINGS: There are advanced emphysematous changes throughout both lungs. A focal opacity in the left mid upper lung zone is again noted, better appreciated on recent CT of the chest.  No convincing evidence of a pneumothorax. Bibasilar airspace opacities are present, left greater than right.   No convincing evidence of a sizable pleural effusion. Heart size is unable to be accurately assessed on this single portable view of the chest, but appears to be stable. Bones are diffusely osteopenic. Severe emphysematous changes with bibasilar linear airspace opacities, potentially basis of scarring or of pulmonary edema. Cta Pulmonary W Contrast    Result Date: 10/15/2020  EXAMINATION: CTA OF THE CHEST 10/15/2020 3:02 pm TECHNIQUE: CTA of the chest was performed after the administration of intravenous contrast.  Multiplanar reformatted images are provided for review. MIP images are provided for review. Dose modulation, iterative reconstruction, and/or weight based adjustment of the mA/kV was utilized to reduce the radiation dose to as low as reasonably achievable. COMPARISON: CT chest from October 6, 2019 HISTORY: ORDERING SYSTEM PROVIDED HISTORY: r/o PE vs pneumonia TECHNOLOGIST PROVIDED HISTORY: Reason for exam:->r/o PE vs pneumonia FINDINGS: The thyroid gland and remaining soft tissue structures of the neck appear unremarkable. No concerning axillary adenopathy. The anterior and posterior chest wall appear unremarkable. There are multiple prominent mediastinal lymph nodes which are not significantly changed from prior exam reference lymph node in the precarinal region series 302 image 111 measures 14 mm (previously 12 mm. Pretracheal lymph node series 302, image 82 measures 11 mm in short axis (previously 11 mm) Normal appearance of the esophagus. Small hiatal hernia. Normal appearance of the thoracic aorta. No aneurysmal dilatation. Main pulmonary artery is of normal size. No thromboembolic phenomenon identified within the pulmonary arterial system. Heart chambers are not enlarged. No pericardial effusion. Mild left coronary distribution coronary artery disease. Airway appears patent. No endobronchial lesions. There is mild bilateral lower lung airway thickening suggestive of chronic inflammation. Severe cystic lung parenchymal changes and bulla formation which are not significantly changed from prior exam.  The distribution and appearance of bilateral reticular opacities is similar to prior exam and are most consistent with scarring/fibrotic change. No pleural effusion or pneumothorax. Posterior irregular nodularity series 301, image 34 measures approximately 9 mm and is unchanged from prior exam. Lingular nodularity series 301, image 96 measures 20 mm and 16 mm respectively. Both of these appear to have increased in the interim between exams. Focal area of pleural thickening in the medial anterior left upper lung series 301, image 65 has decreased in size. Upper abdomen is unremarkable. Thoracic vertebral body height and alignment is maintained. Multilevel endplate spondylosis. 1.  No evidence of pulmonary embolism. 2.  No airspace disease, pleural effusions, or pneumothoraces. Severe emphysematous changes and pleuroparenchymal scarring. 3. Interval increase in irregular nodules in the lingula. Largest measures 20 mm. (Follow-up chest CT in 3 months is suggested to reassess this area.) 4. Essentially stable mediastinal lymphadenopathy which can also be reassessed on recommended follow-up chest CT. Discharge Exam:  See progress note from today    Current Discharge Medication List      START taking these medications    Details   benzocaine-menthol (CEPACOL SORE THROAT) 15-3.6 MG lozenge Take 1 lozenge by mouth every 2 hours as needed for Sore Throat  Qty: 168 lozenge         CONTINUE these medications which have NOT CHANGED    Details   clarithromycin (BIAXIN) 500 MG tablet Take 2 tablets by mouth three times a week  Qty: 72 tablet, Refills: 1      gabapentin (NEURONTIN) 300 MG capsule Take 300 mg by mouth 3 times daily.        oxyCODONE-acetaminophen (PERCOCET) 7.5-325 MG per tablet TK 1 T PO BID PRN P      Fluticasone-Umeclidin-Vilant (TRELEGY ELLIPTA) 100-62.5-25 MCG/INH AEPB Inhale 1 puff into the lungs daily  Qty: 2 each, Refills: 0      OXYGEN Inhale 3.5 L into the lungs continuous prn       etodolac (LODINE) 500 MG tablet Take 500 mg by mouth every morning       ethambutol (MYAMBUTOL) 400 MG tablet Take 2 tablets by mouth three times a week  Qty: 72 tablet, Refills: 1      rifAMPin (RIFADIN) 300 MG capsule Take 2 capsules by mouth three times a week  Qty: 72 capsule, Refills: 1      traMADol (ULTRAM) 50 MG tablet Take 50 mg by mouth every 6 hours as needed for Pain. clotrimazole-betamethasone (LOTRISONE) 1-0.05 % lotion Apply topically 2 times daily. Qty: 30 mL, Refills: 1      albuterol sulfate  (90 Base) MCG/ACT inhaler Inhale 2 puffs into the lungs every 6 hours as needed for Wheezing or Shortness of Breath         STOP taking these medications       gabapentin (NEURONTIN) 600 MG tablet Comments:   Reason for Stopping:               Time Spent on discharge is more than 30 minutes --      TIME  INCLUDES TIME THAT WAS  SPENT WITH DISCHARGE PAPERS, MEDICATION REVIEW, MEDICATION RECONCILIATION,   PRESCRIPTIONS, CHART REVIEW, PATIENT EXAM , FINAL PROGRESS NOTE, DISCUSSION OF FINDINGS WITH PATIENT AND AVAILABLE FAMILY , AND DICTATION  WHERE NEEDED ; Home Health Care [facility]) FORMS COMPLETED ; N-17  COMPLETION ;  H&P UPDATED ; DURABLE EQUIPMENT FORMS.      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

## 2020-10-17 NOTE — PROGRESS NOTES
ID Progress Note                [address] [facility] [address], [place], [address]            Phone [phone]     Fax [phone]      Chief complaint   Chest pain    Subjective:  Completed nuclear stress test  The patient is awake and alert. Tolerating medications. Reports no side effects. Afebrile. 10 ROS otherwise negative unless otherwise specified above. Objective:    Vitals:    10/17/20 1115   BP:    Pulse:    Resp:    Temp:    SpO2: 90%   Constitutional: The patient is awake, alert, and oriented. No distress. Skin: Warm and dry. No rashes were noted. HEENT: Eyes show round, and reactive pupils. No jaundice. Moist mucous membranes, no ulcerations, no thrush. Neck: Supple to movements. No lymphadenopathy. Chest: No use of accessory muscles to breathe. Symmetrical expansion. Auscultation reveals no wheezing, crackles, or rhonchi. Cardiovascular: S1 and S2 are rhythmic and regular. No murmurs appreciated. Abdomen: Positive bowel sounds to auscultation. Benign to palpation. No masses felt. No hepatosplenomegaly. Extremities: No clubbing, no cyanosis, no edema.   Lines: peripheral      Labs:  Recent Labs     10/15/20  1315 10/16/20  0615 10/17/20  0420   WBC 4.1* 5.8 6.9   RBC 5.85* 5.77 5.77   HGB 17.3* 17.1* 16.9*   HCT 53.3 51.8 51.9   MCV 91.1 89.8 89.9   MCH 29.6 29.6 29.3   MCHC 32.5 33.0 32.6   RDW 15.4* 15.7* 15.7*    144 155   MPV 10.0 9.7 10.2     CMP:    Lab Results   Component Value Date     10/17/2020    K 4.0 10/17/2020    K 3.8 10/02/2019     10/17/2020    CO2 24 10/17/2020    BUN 9 10/17/2020    CREATININE 0.7 10/17/2020    GFRAA >60 10/17/2020    LABGLOM >60 10/17/2020    GLUCOSE 106 10/17/2020    PROT 6.5 10/17/2020    LABALBU 3.6 10/17/2020    CALCIUM 9.3 10/17/2020    BILITOT 0.4 10/17/2020    ALKPHOS 65 10/17/2020    AST 13 10/17/2020    ALT 7 10/17/2020          Microbiology :  Recent Labs     10/15/20  1315   BC 24 Hours no growth Recent Labs     10/15/20  1315   BLOODCULT2 24 Hours no growth     Recent Labs     10/16/20  0640   LABURIN Growth not present, incubation continues     No results for input(s): CULTRESP in the last 72 hours. No results for input(s): WNDABS in the last 72 hours. Radiology :  CTA PULMONARY W CONTRAST   Final Result   1. No evidence of pulmonary embolism. 2.  No airspace disease, pleural effusions, or pneumothoraces. Severe   emphysematous changes and pleuroparenchymal scarring. 3. Interval increase in irregular nodules in the lingula. Largest measures   20 mm. (Follow-up chest CT in 3 months is suggested to reassess this area.)      4. Essentially stable mediastinal lymphadenopathy which can also be   reassessed on recommended follow-up chest CT. XR CHEST PORTABLE   Final Result   Severe emphysematous changes with bibasilar linear airspace opacities,   potentially basis of scarring or of pulmonary edema. NM Cardiac Stress Test Nuclear Imaging    (Results Pending)       Assessment:  · Mycobacterium avium complex left upper lobe. Treated with 6 weeks of IV Amikacin. He is also been on Clarithromycin, Ethambutol and Rifampin since August 2019  · Chest pain  · Rhinovirus/enterovirus positive     Plan:    · Continue Clarithromycin, Ethambutol and Rifampin 3 times weekly until at least January 2021  · The patient can be discharged from ID standpoint. Encouraged to keep appointment this December.           Electronically signed by Lanette Grace MD on 10/17/2020 at 11:56 AM

## 2020-10-18 LAB — URINE CULTURE, ROUTINE: NORMAL

## 2020-10-19 ENCOUNTER — CARE COORDINATION (OUTPATIENT)
Dept: CASE MANAGEMENT | Age: 70
End: 2020-10-19

## 2020-10-19 NOTE — CARE COORDINATION
COVID-19 Monitoring Initial Follow-up Note    First attempt to reach the patient for initial COVID Monitoring (negative) Care Transition call post hospital discharge, no answer or option to leave a message after multiple rings.

## 2020-10-20 ENCOUNTER — CARE COORDINATION (OUTPATIENT)
Dept: CASE MANAGEMENT | Age: 70
End: 2020-10-20

## 2020-10-20 LAB
BLOOD CULTURE, ROUTINE: NORMAL
CULTURE, BLOOD 2: NORMAL

## 2020-10-20 NOTE — CARE COORDINATION
COVID-19 Monitoring Initial Follow-up Note    Second attempt to reach the patient for COVID Monitoring (negative) Care Transition call post hospital discharge. Message left with CTN's contact information requesting return phone call.

## 2020-11-03 PROBLEM — J18.9 RIGHT LOWER LOBE PNEUMONIA: Status: RESOLVED | Noted: 2018-11-15 | Resolved: 2020-11-03

## 2020-11-03 PROBLEM — J18.9 LEFT UPPER LOBE PNEUMONIA: Status: RESOLVED | Noted: 2017-10-12 | Resolved: 2020-11-03

## 2020-12-10 ENCOUNTER — OFFICE VISIT (OUTPATIENT)
Dept: PRIMARY CARE CLINIC | Age: 70
End: 2020-12-10
Payer: MEDICARE

## 2020-12-10 VITALS
DIASTOLIC BLOOD PRESSURE: 70 MMHG | SYSTOLIC BLOOD PRESSURE: 130 MMHG | TEMPERATURE: 97.9 F | HEART RATE: 82 BPM | WEIGHT: 195 LBS | OXYGEN SATURATION: 88 % | HEIGHT: 72 IN | BODY MASS INDEX: 26.41 KG/M2 | RESPIRATION RATE: 18 BRPM

## 2020-12-10 PROCEDURE — 4040F PNEUMOC VAC/ADMIN/RCVD: CPT | Performed by: INTERNAL MEDICINE

## 2020-12-10 PROCEDURE — 3017F COLORECTAL CA SCREEN DOC REV: CPT | Performed by: INTERNAL MEDICINE

## 2020-12-10 PROCEDURE — G8427 DOCREV CUR MEDS BY ELIG CLIN: HCPCS | Performed by: INTERNAL MEDICINE

## 2020-12-10 PROCEDURE — G8482 FLU IMMUNIZE ORDER/ADMIN: HCPCS | Performed by: INTERNAL MEDICINE

## 2020-12-10 PROCEDURE — G0438 PPPS, INITIAL VISIT: HCPCS | Performed by: INTERNAL MEDICINE

## 2020-12-10 PROCEDURE — G8417 CALC BMI ABV UP PARAM F/U: HCPCS | Performed by: INTERNAL MEDICINE

## 2020-12-10 PROCEDURE — 99213 OFFICE O/P EST LOW 20 MIN: CPT | Performed by: INTERNAL MEDICINE

## 2020-12-10 PROCEDURE — 1123F ACP DISCUSS/DSCN MKR DOCD: CPT | Performed by: INTERNAL MEDICINE

## 2020-12-10 PROCEDURE — 1036F TOBACCO NON-USER: CPT | Performed by: INTERNAL MEDICINE

## 2020-12-10 ASSESSMENT — PATIENT HEALTH QUESTIONNAIRE - PHQ9
SUM OF ALL RESPONSES TO PHQ QUESTIONS 1-9: 0
2. FEELING DOWN, DEPRESSED OR HOPELESS: 0
SUM OF ALL RESPONSES TO PHQ QUESTIONS 1-9: 0
SUM OF ALL RESPONSES TO PHQ9 QUESTIONS 1 & 2: 0
SUM OF ALL RESPONSES TO PHQ QUESTIONS 1-9: 0
1. LITTLE INTEREST OR PLEASURE IN DOING THINGS: 0

## 2020-12-10 ASSESSMENT — LIFESTYLE VARIABLES: HOW OFTEN DO YOU HAVE A DRINK CONTAINING ALCOHOL: 0

## 2020-12-10 NOTE — PROGRESS NOTES
Medicare Annual Wellness Visit  Name: Kavitha Rocha Date: 12/10/2020   MRN: [id number] Sex: Male   Age: 78 y.o. Ethnicity: Non-/Non    : 1950 Race: Lorraine Dear is here for Sivakumar HIGGINS  He complains of chronic low back pain for which he has been treated by [facility] pain management following his lumbar decompression fusion procedure done last year. He is still having persistent pain in his left lower back and some numbness and discomfort involving the lateral aspect of his left lower leg. Also currently is on OxyContin as needed for pain. He is requesting a second opinion from [facility] pain management. He is also complaining of discomfort left ear and feeling as if it is plugged and is requesting referral to ENT for further evaluation and treatment. 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 He is doing well and hopefully will come off 1 of those drugs soon. Also he was recently admitted briefly for a rhinovirus infection which caused an exacerbation of his COPD. He follows up with his pulmonologist periodically. Screenings for behavioral, psychosocial and functional/safety risks, and cognitive dysfunction are all negative except as indicated below. These results, as well as other patient data from the [address] form, are documented in Flowsheets linked to this Encounter. No Known Allergies      Prior to Visit Medications    Medication Sig Taking?  Authorizing Provider   ethambutol (MYAMBUTOL) 400 MG tablet Take 2 tablets by mouth three times a week  Patient taking differently: Take 800 mg by mouth three times a week mon-wed-fri Yes Aman Moreno MD   rifAMPin (RIFADIN) 300 MG capsule Take 2 capsules by mouth three times a week  Patient taking differently: Take 600 mg by mouth three times a week mon-wed-fri Yes Aman Moreno MD   oxyCODONE-acetaminophen (PERCOCET) 7.5-325 MG per tablet TK 1 T PO BID PRN P Yes Historical Provider, MD   clotrimazole-betamethasone (LOTRISONE) 1-0.05 % lotion Apply topically 2 times daily.  Yes Ora Jaimes,    Fluticasone-Umeclidin-Vilant (TRELEGY ELLIPTA) 100-62.5-25 MCG/INH AEPB Inhale 1 puff into the lungs daily  Patient taking differently: Inhale 1 puff into the lungs every morning  Yes Alyssa Chacon APRN - CNP   albuterol sulfate  (90 Base) MCG/ACT inhaler Inhale 2 puffs into the lungs every 6 hours as needed for Wheezing or Shortness of Breath Yes Historical Provider, MD   OXYGEN Inhale 3.5 L into the lungs continuous prn  Yes Historical Provider, MD   etodolac (LODINE) 500 MG tablet Take 500 mg by mouth every morning  Yes Historical Provider, MD         Past Medical History:   Diagnosis Date    Acute and chronic respiratory failure with hypoxia ([address] 10/12/2017    Acute respiratory failure with hypoxia ([address] 11/12/2018    Bullous emphysema ([address] 11/12/2018    Chronic back pain     Degeneration of umbar intervertebral disc    Colon cancer screening     COPD (chronic obstructive pulmonary disease) ([address]     Cough with hemoptysis 4/23/2019    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 ([address] 08/15/2019    First seen by ID; treatment started 9/4/2019    Glucose intolerance 6/10/2019    Hilar adenopathy 6/10/2019    Hypophosphatemia 6/10/2019    Infection due to parainfluenza virus 3 6/10/2019    Left upper lobe pneumonia 10/12/2017    Pleural effusion on right 10/3/2019    Pulmonary emphysema with fibrosis of lung ([address] 11/15/2018    Pulmonary Mycobacterium avium complex (MAC) infection ([address] 07/12/2019    BAL results finally completed this date    Rhinovirus infection 10/16/2020    Right lower lobe pneumonia 11/15/2018    Recurrent 4/23/19    S/P lumbar fusion 6/1/2019    Thoracic ascending aortic aneurysm ([address] 11/15/2018    Proximal ascending aorta; 3.8 cm; 11/15/18 Past Surgical History:   Procedure Laterality Date    ABSCESS DRAINAGE  2006    X2 RECTAL ABSCESS    BRONCHOSCOPY N/A 2019    BRONCHOSCOPY BRUSHINGS performed by Dona George MD at [facility] N/A 10/7/2019    BRONCHOSCOPY DIAGNOSTIC OR CELL 8 Ruclarissa Pereraidi ONLY performed by Dona George MD at [facility]  2013    LUMBAR FUSION  2019    [facility]         Family History   Problem Relation Age of Onset    Other Mother          age 80    Other Father          age 80       CareTeam (Including outside providers/suppliers regularly involved in providing care):   Patient Care Team:  Lori Conte MD as PCP - General (Internal Medicine)  Lori Conte MD as PCP - [facility] Empaneled Provider  Dona George MD as Consulting Physician (Pulmonology)    Wt Readings from Last 3 Encounters:   12/10/20 195 lb (88.5 kg)   10/21/20 195 lb (88.5 kg)   10/17/20 196 lb 9.6 oz (89.2 kg)     Vitals:    12/10/20 1518   BP: 130/70   Pulse: 82   Resp: 18   Temp: 97.9 °F (36.6 °C)   SpO2: (!) 88%   Weight: 195 lb (88.5 kg)   Height: 6' (1.829 m)     Body mass index is 26.45 kg/m². Based upon direct observation of the patient, evaluation of cognition reveals recent and remote memory intact. Patient is alert and oriented and appears to be in no distress. HEENT: Both ears with a moderate amount of cerumen obstructing the external auditory canal.  He has breathing comfortably at rest without oxygen and with a O2 sat of 88% on room air. Neck supple no adenopathy no bruits. Heart regular rhythm no murmurs gallops rubs. Lungs clear with distant breath sounds bilaterally. Abdomen is soft and nontender. Back reveals a healed surgical scar over his lumbar spine. No neurologic deficits noted. Patient's complete Health Risk Assessment and screening values have been reviewed and are found in Flowsheets.  The following problems were reviewed today and where indicated follow up appointments were made and/or referrals ordered. Positive Risk Factor Screenings with Interventions:       General Health and ACP:  General  In general, how would you say your health is?: Good  In the past 7 days, have you experienced any of the following? New or Increased Pain, New or Increased Fatigue, Loneliness, Social Isolation, Stress or Anger?: (!) New or Increased Pain  Do you get the social and emotional support that you need?: Yes  Do you have a Living Will?: Yes  Advance Directives     Power of  Living Will ACP-Advance Directive ACP-Power of     Not on File [place] on 12/06/17 Filed [address] [facility] Risk Interventions:  · No further health risk intervention necessary. Health Habits/Nutrition:  Health Habits/Nutrition  Do you exercise for at least 20 minutes 2-3 times per week?: (!) No  Have you lost any weight without trying in the past 3 months?: No  Do you eat fewer than 2 meals per day?: No  Have you seen a dentist within the past year?: Yes  Body mass index: (!) 26.44  Health Habits/Nutrition Interventions:  · Patient advised to try to maintain a heart healthy diet and get an annual eye exam.    Hearing/Vision:  No exam data present  Hearing/Vision  Do you or your family notice any trouble with your hearing?: (!) Yes  Do you have difficulty driving, watching TV, or doing any of your daily activities because of your eyesight?: No  Have you had an eye exam within the past year?: (!) No  Hearing/Vision Interventions:  · Patient being referred to ENT for earwax removal and will get his hearing checked as well.     Personalized Preventive Plan   Current Health Maintenance Status  Immunization History   Administered Date(s) Administered    Influenza Vaccine, unspecified formulation 10/02/2013, 10/27/2014, 09/22/2016, 11/13/2017    Influenza Virus Vaccine 08/28/2015, 10/12/2016, 11/30/2018    Influenza, High Dose (Fluzone 65 yrs and older) 10/26/2017, 10/01/2019, 09/09/2020    Influenza, Quadv, IM, PF (6 mo and older Fluzone, Flulaval, Fluarix, and 3 yrs and older Afluria) 08/28/2015    Influenza, Triv, inactivated, subunit, adjuvanted, IM (Fluad 65 yrs and older) 11/28/2018    Pneumococcal Conjugate 13-valent ([id number]) 08/28/2015, 09/14/2020    Pneumococcal Conjugate Vaccine 10/12/2015    Pneumococcal Polysaccharide ([id number]) 10/26/2017    Zoster Live (Zostavax) 09/22/2016        Health Maintenance   Topic Date Due    AAA screen  1950    Hepatitis C screen  1950    DTaP/Tdap/Td vaccine (1 - Tdap) 07/30/1971    Colon cancer screen colonoscopy  07/30/2002    Shingles Vaccine (2 of 3) 11/17/2016    Annual Wellness Visit (AWV)  06/20/2019    Low dose CT lung screening  10/06/2020    Diabetes screen  10/16/2023    Lipid screen  10/16/2025    Flu vaccine  Completed    Pneumococcal 65+ years Vaccine  Completed    Hepatitis A vaccine  Aged Out    Hepatitis B vaccine  Aged Out    Hib vaccine  Aged Out    Meningococcal (ACWY) vaccine  Aged Out     Recommendations for [employer] Due: see orders and patient instructions/AVS.  . Recommended screening schedule for the next 5-10 years is provided to the patient in written form: see Patient Instructions/AVS.    Roosevelt Hamman was seen today for medicare awv. Diagnoses and all orders for this visit:    Routine general medical examination at a health care facility    Chronic left-sided low back pain with left-sided sciatica  -     Tae Dixon MD, Pain Medicine, [facility] [place]  -     [facility] Sandoval [facility], [facility]        Discussion: Patient to continue all of his current meds and supplements the same as listed on his med list.  He is encouraged to try to follow a heart healthy diet and exercise as tolerated.   Will refer him to [facility] pain management for further evaluation and treatment regarding his chronic low back pain and left lumbar radiculopathy. Also will refer him to ENT for removal of earwax bilaterally.

## 2020-12-10 NOTE — PATIENT INSTRUCTIONS
Personalized Preventive Plan for Tory Sepulveda - 12/10/2020  Medicare offers a range of preventive health benefits. Some of the tests and screenings are paid in full while other may be subject to a deductible, co-insurance, and/or copay. Some of these benefits include a comprehensive review of your medical history including lifestyle, illnesses that may run in your family, and various assessments and screenings as appropriate. After reviewing your medical record and screening and assessments performed today your provider may have ordered immunizations, labs, imaging, and/or referrals for you. A list of these orders (if applicable) as well as your Preventive Care list are included within your After Visit Summary for your review. Other Preventive Recommendations:    · A preventive eye exam performed by an eye specialist is recommended every 1-2 years to screen for glaucoma; cataracts, macular degeneration, and other eye disorders. · A preventive dental visit is recommended every 6 months. · Try to get at least 150 minutes of exercise per week or 10,000 steps per day on a pedometer . · Order or download the FREE \"Exercise & Physical Activity: Your Everyday Guide\" from [employer]. Call 5-684.644.1728 or search [employer] online. · You need 4476-6003 mg of calcium and 7770-1274 IU of vitamin D per day. It is possible to meet your calcium requirement with diet alone, but a vitamin D supplement is usually necessary to meet this goal.  · When exposed to the sun, use a sunscreen that protects against both UVA and UVB radiation with an SPF of 30 or greater. Reapply every 2 to 3 hours or after sweating, drying off with a towel, or swimming. · Always wear a seat belt when traveling in a car. Always wear a helmet when riding a bicycle or motorcycle.

## 2020-12-28 ENCOUNTER — TELEPHONE (OUTPATIENT)
Dept: PRIMARY CARE CLINIC | Age: 70
End: 2020-12-28

## 2020-12-28 ENCOUNTER — HOSPITAL ENCOUNTER (OUTPATIENT)
Age: 70
Discharge: HOME | End: 2020-12-28
Payer: MEDICARE

## 2021-01-01 ENCOUNTER — APPOINTMENT (OUTPATIENT)
Dept: ULTRASOUND IMAGING | Age: 71
DRG: 177 | End: 2021-01-01
Payer: MEDICARE

## 2021-01-01 ENCOUNTER — APPOINTMENT (OUTPATIENT)
Dept: GENERAL RADIOLOGY | Age: 71
DRG: 177 | End: 2021-01-01
Payer: MEDICARE

## 2021-01-01 ENCOUNTER — HOSPITAL ENCOUNTER (INPATIENT)
Age: 71
LOS: 7 days | Discharge: HOME HEALTH | DRG: 177 | End: 2021-01-08
Attending: EMERGENCY MEDICINE | Admitting: INTERNAL MEDICINE
Payer: MEDICARE

## 2021-01-01 DIAGNOSIS — J96.21 ACUTE ON CHRONIC RESPIRATORY FAILURE WITH HYPOXIA (HCC): Primary | ICD-10-CM

## 2021-01-01 DIAGNOSIS — R09.02 HYPOXIA: ICD-10-CM

## 2021-01-01 DIAGNOSIS — Z20.822 SUSPECTED COVID-19 VIRUS INFECTION: ICD-10-CM

## 2021-01-01 DIAGNOSIS — D72.819 LEUKOPENIA, UNSPECIFIED TYPE: ICD-10-CM

## 2021-01-01 PROBLEM — J96.01 ACUTE HYPOXEMIC RESPIRATORY FAILURE (HCC): Status: ACTIVE | Noted: 2021-01-01

## 2021-01-01 LAB
ADENOVIRUS BY PCR: NOT DETECTED
ALBUMIN SERPL-MCNC: 4 G/DL (ref 3.5–5.2)
ALP BLD-CCNC: 67 U/L (ref 40–129)
ALT SERPL-CCNC: 8 U/L (ref 0–40)
ANION GAP SERPL CALCULATED.3IONS-SCNC: 8 MMOL/L (ref 7–16)
AST SERPL-CCNC: 17 U/L (ref 0–39)
ATYPICAL LYMPHOCYTE RELATIVE PERCENT: 3.4 % (ref 0–4)
BACTERIA: ABNORMAL /HPF
BASOPHILS ABSOLUTE: 0.03 E9/L (ref 0–0.2)
BASOPHILS RELATIVE PERCENT: 0.9 % (ref 0–2)
BILIRUB SERPL-MCNC: 0.2 MG/DL (ref 0–1.2)
BILIRUBIN URINE: NEGATIVE
BLOOD, URINE: NEGATIVE
BORDETELLA PARAPERTUSSIS BY PCR: NOT DETECTED
BORDETELLA PERTUSSIS BY PCR: NOT DETECTED
BUN BLDV-MCNC: 13 MG/DL (ref 8–23)
CALCIUM SERPL-MCNC: 9.2 MG/DL (ref 8.6–10.2)
CHLAMYDOPHILIA PNEUMONIAE BY PCR: NOT DETECTED
CHLORIDE BLD-SCNC: 102 MMOL/L (ref 98–107)
CLARITY: CLEAR
CO2: 24 MMOL/L (ref 22–29)
COLOR: YELLOW
CORONAVIRUS 229E BY PCR: NOT DETECTED
CORONAVIRUS HKU1 BY PCR: NOT DETECTED
CORONAVIRUS NL63 BY PCR: NOT DETECTED
CORONAVIRUS OC43 BY PCR: NOT DETECTED
CREAT SERPL-MCNC: 0.9 MG/DL (ref 0.7–1.2)
D DIMER: 311 NG/ML DDU
EKG ATRIAL RATE: 80 BPM
EKG P AXIS: 46 DEGREES
EKG P-R INTERVAL: 176 MS
EKG Q-T INTERVAL: 424 MS
EKG QRS DURATION: 106 MS
EKG QTC CALCULATION (BAZETT): 489 MS
EKG R AXIS: 38 DEGREES
EKG T AXIS: 53 DEGREES
EKG VENTRICULAR RATE: 80 BPM
EOSINOPHILS ABSOLUTE: 0 E9/L (ref 0.05–0.5)
EOSINOPHILS RELATIVE PERCENT: 0 % (ref 0–6)
GFR AFRICAN AMERICAN: >60
GFR NON-AFRICAN AMERICAN: >60 ML/MIN/1.73
GLUCOSE BLD-MCNC: 104 MG/DL (ref 74–99)
GLUCOSE URINE: NEGATIVE MG/DL
HCT VFR BLD CALC: 53 % (ref 37–54)
HEMOGLOBIN: 16.9 G/DL (ref 12.5–16.5)
HUMAN METAPNEUMOVIRUS BY PCR: NOT DETECTED
HUMAN RHINOVIRUS/ENTEROVIRUS BY PCR: NOT DETECTED
INFLUENZA A BY PCR: NOT DETECTED
INFLUENZA B BY PCR: NOT DETECTED
KETONES, URINE: ABNORMAL MG/DL
LACTIC ACID, SEPSIS: 1.2 MMOL/L (ref 0.5–1.9)
LEUKOCYTE ESTERASE, URINE: NEGATIVE
LYMPHOCYTES ABSOLUTE: 0.27 E9/L (ref 1.5–4)
LYMPHOCYTES RELATIVE PERCENT: 3.4 % (ref 20–42)
MCH RBC QN AUTO: 29.3 PG (ref 26–35)
MCHC RBC AUTO-ENTMCNC: 31.9 % (ref 32–34.5)
MCV RBC AUTO: 92 FL (ref 80–99.9)
MONOCYTES ABSOLUTE: 0.19 E9/L (ref 0.1–0.95)
MONOCYTES RELATIVE PERCENT: 5.2 % (ref 2–12)
MYCOPLASMA PNEUMONIAE BY PCR: NOT DETECTED
NEUTROPHILS ABSOLUTE: 3.31 E9/L (ref 1.8–7.3)
NEUTROPHILS RELATIVE PERCENT: 87.1 % (ref 43–80)
NITRITE, URINE: NEGATIVE
NUCLEATED RED BLOOD CELLS: 0 /100 WBC
PARAINFLUENZA VIRUS 1 BY PCR: NOT DETECTED
PARAINFLUENZA VIRUS 2 BY PCR: NOT DETECTED
PARAINFLUENZA VIRUS 3 BY PCR: NOT DETECTED
PARAINFLUENZA VIRUS 4 BY PCR: NOT DETECTED
PDW BLD-RTO: 14.8 FL (ref 11.5–15)
PH UA: 5.5 (ref 5–9)
PLATELET # BLD: 152 E9/L (ref 130–450)
PMV BLD AUTO: 10.4 FL (ref 7–12)
POTASSIUM REFLEX MAGNESIUM: 4.1 MMOL/L (ref 3.5–5)
PROTEIN UA: NEGATIVE MG/DL
RBC # BLD: 5.76 E12/L (ref 3.8–5.8)
RBC # BLD: NORMAL 10*6/UL
RBC UA: ABNORMAL /HPF (ref 0–2)
RESPIRATORY SYNCYTIAL VIRUS BY PCR: NOT DETECTED
SARS-COV-2, PCR: DETECTED
SODIUM BLD-SCNC: 134 MMOL/L (ref 132–146)
SPECIFIC GRAVITY UA: >=1.03 (ref 1–1.03)
TOTAL PROTEIN: 6.7 G/DL (ref 6.4–8.3)
TROPONIN: <0.01 NG/ML (ref 0–0.03)
UROBILINOGEN, URINE: 0.2 E.U./DL
WBC # BLD: 3.8 E9/L (ref 4.5–11.5)
WBC UA: ABNORMAL /HPF (ref 0–5)

## 2021-01-01 PROCEDURE — 2060000000 HC ICU INTERMEDIATE R&B

## 2021-01-01 PROCEDURE — 2500000003 HC RX 250 WO HCPCS: Performed by: INTERNAL MEDICINE

## 2021-01-01 PROCEDURE — 6360000002 HC RX W HCPCS: Performed by: STUDENT IN AN ORGANIZED HEALTH CARE EDUCATION/TRAINING PROGRAM

## 2021-01-01 PROCEDURE — 6360000002 HC RX W HCPCS: Performed by: INTERNAL MEDICINE

## 2021-01-01 PROCEDURE — 2580000003 HC RX 258: Performed by: INTERNAL MEDICINE

## 2021-01-01 PROCEDURE — 93005 ELECTROCARDIOGRAM TRACING: CPT | Performed by: STUDENT IN AN ORGANIZED HEALTH CARE EDUCATION/TRAINING PROGRAM

## 2021-01-01 PROCEDURE — 80053 COMPREHEN METABOLIC PANEL: CPT

## 2021-01-01 PROCEDURE — 94664 DEMO&/EVAL PT USE INHALER: CPT

## 2021-01-01 PROCEDURE — 94640 AIRWAY INHALATION TREATMENT: CPT

## 2021-01-01 PROCEDURE — 93010 ELECTROCARDIOGRAM REPORT: CPT | Performed by: INTERNAL MEDICINE

## 2021-01-01 PROCEDURE — 36415 COLL VENOUS BLD VENIPUNCTURE: CPT

## 2021-01-01 PROCEDURE — 83605 ASSAY OF LACTIC ACID: CPT

## 2021-01-01 PROCEDURE — 93971 EXTREMITY STUDY: CPT

## 2021-01-01 PROCEDURE — 99285 EMERGENCY DEPT VISIT HI MDM: CPT

## 2021-01-01 PROCEDURE — 0202U NFCT DS 22 TRGT SARS-COV-2: CPT

## 2021-01-01 PROCEDURE — 81001 URINALYSIS AUTO W/SCOPE: CPT

## 2021-01-01 PROCEDURE — 2580000003 HC RX 258: Performed by: STUDENT IN AN ORGANIZED HEALTH CARE EDUCATION/TRAINING PROGRAM

## 2021-01-01 PROCEDURE — 85378 FIBRIN DEGRADE SEMIQUANT: CPT

## 2021-01-01 PROCEDURE — 84484 ASSAY OF TROPONIN QUANT: CPT

## 2021-01-01 PROCEDURE — 2700000000 HC OXYGEN THERAPY PER DAY

## 2021-01-01 PROCEDURE — 71045 X-RAY EXAM CHEST 1 VIEW: CPT

## 2021-01-01 PROCEDURE — 87040 BLOOD CULTURE FOR BACTERIA: CPT

## 2021-01-01 PROCEDURE — 6370000000 HC RX 637 (ALT 250 FOR IP): Performed by: INTERNAL MEDICINE

## 2021-01-01 PROCEDURE — 85025 COMPLETE CBC W/AUTO DIFF WBC: CPT

## 2021-01-01 RX ORDER — SODIUM CHLORIDE, SODIUM LACTATE, POTASSIUM CHLORIDE, AND CALCIUM CHLORIDE .6; .31; .03; .02 G/100ML; G/100ML; G/100ML; G/100ML
500 INJECTION, SOLUTION INTRAVENOUS ONCE
Status: COMPLETED | OUTPATIENT
Start: 2021-01-01 | End: 2021-01-01

## 2021-01-01 RX ORDER — SODIUM CHLORIDE 0.9 % (FLUSH) 0.9 %
10 SYRINGE (ML) INJECTION PRN
Status: DISCONTINUED | OUTPATIENT
Start: 2021-01-01 | End: 2021-01-08 | Stop reason: HOSPADM

## 2021-01-01 RX ORDER — OXYCODONE HYDROCHLORIDE AND ACETAMINOPHEN 5; 325 MG/1; MG/1
1 TABLET ORAL 2 TIMES DAILY PRN
Status: DISCONTINUED | OUTPATIENT
Start: 2021-01-01 | End: 2021-01-08 | Stop reason: HOSPADM

## 2021-01-01 RX ORDER — CLARITHROMYCIN 500 MG/1
1000 TABLET, COATED ORAL
COMMUNITY
End: 2021-03-04

## 2021-01-01 RX ORDER — ETHAMBUTOL HYDROCHLORIDE 400 MG/1
800 TABLET, FILM COATED ORAL
COMMUNITY
End: 2021-03-04 | Stop reason: SDUPTHER

## 2021-01-01 RX ORDER — ACETAMINOPHEN 325 MG/1
650 TABLET ORAL EVERY 6 HOURS PRN
Status: DISCONTINUED | OUTPATIENT
Start: 2021-01-01 | End: 2021-01-08 | Stop reason: HOSPADM

## 2021-01-01 RX ORDER — IPRATROPIUM BROMIDE AND ALBUTEROL SULFATE 2.5; .5 MG/3ML; MG/3ML
2 SOLUTION RESPIRATORY (INHALATION) ONCE
Status: DISCONTINUED | OUTPATIENT
Start: 2021-01-01 | End: 2021-01-01

## 2021-01-01 RX ORDER — ALBUTEROL SULFATE 90 UG/1
2 AEROSOL, METERED RESPIRATORY (INHALATION) ONCE
Status: DISCONTINUED | OUTPATIENT
Start: 2021-01-01 | End: 2021-01-01 | Stop reason: CLARIF

## 2021-01-01 RX ORDER — 0.9 % SODIUM CHLORIDE 0.9 %
1000 INTRAVENOUS SOLUTION INTRAVENOUS ONCE
Status: COMPLETED | OUTPATIENT
Start: 2021-01-01 | End: 2021-01-01

## 2021-01-01 RX ORDER — ACETAMINOPHEN 650 MG/1
650 SUPPOSITORY RECTAL EVERY 6 HOURS PRN
Status: DISCONTINUED | OUTPATIENT
Start: 2021-01-01 | End: 2021-01-08 | Stop reason: HOSPADM

## 2021-01-01 RX ORDER — ONDANSETRON 2 MG/ML
4 INJECTION INTRAMUSCULAR; INTRAVENOUS ONCE
Status: COMPLETED | OUTPATIENT
Start: 2021-01-01 | End: 2021-01-01

## 2021-01-01 RX ORDER — OXYCODONE HYDROCHLORIDE 5 MG/1
2.5 TABLET ORAL 2 TIMES DAILY PRN
Status: DISCONTINUED | OUTPATIENT
Start: 2021-01-01 | End: 2021-01-08 | Stop reason: HOSPADM

## 2021-01-01 RX ORDER — CLARITHROMYCIN 500 MG/1
500 TABLET, COATED ORAL 2 TIMES DAILY
Status: DISCONTINUED | OUTPATIENT
Start: 2021-01-01 | End: 2021-01-02

## 2021-01-01 RX ORDER — ALBUTEROL SULFATE 2.5 MG/3ML
2.5 SOLUTION RESPIRATORY (INHALATION) ONCE
Status: COMPLETED | OUTPATIENT
Start: 2021-01-01 | End: 2021-01-01

## 2021-01-01 RX ORDER — PREGABALIN 100 MG/1
100 CAPSULE ORAL 2 TIMES DAILY
Status: ON HOLD | COMMUNITY
End: 2021-01-01

## 2021-01-01 RX ORDER — DEXAMETHASONE SODIUM PHOSPHATE 10 MG/ML
6 INJECTION, SOLUTION INTRAMUSCULAR; INTRAVENOUS ONCE
Status: COMPLETED | OUTPATIENT
Start: 2021-01-01 | End: 2021-01-01

## 2021-01-01 RX ORDER — OXYCODONE AND ACETAMINOPHEN 7.5; 325 MG/1; MG/1
1 TABLET ORAL 2 TIMES DAILY PRN
Status: DISCONTINUED | OUTPATIENT
Start: 2021-01-01 | End: 2021-01-01 | Stop reason: CLARIF

## 2021-01-01 RX ORDER — ONDANSETRON 2 MG/ML
4 INJECTION INTRAMUSCULAR; INTRAVENOUS EVERY 6 HOURS PRN
Status: DISCONTINUED | OUTPATIENT
Start: 2021-01-01 | End: 2021-01-08 | Stop reason: HOSPADM

## 2021-01-01 RX ORDER — POLYETHYLENE GLYCOL 3350 17 G/17G
17 POWDER, FOR SOLUTION ORAL DAILY PRN
Status: DISCONTINUED | OUTPATIENT
Start: 2021-01-01 | End: 2021-01-08 | Stop reason: HOSPADM

## 2021-01-01 RX ORDER — BUDESONIDE AND FORMOTEROL FUMARATE DIHYDRATE 80; 4.5 UG/1; UG/1
2 AEROSOL RESPIRATORY (INHALATION) 2 TIMES DAILY
Status: DISCONTINUED | OUTPATIENT
Start: 2021-01-02 | End: 2021-01-08 | Stop reason: HOSPADM

## 2021-01-01 RX ORDER — PREGABALIN 50 MG/1
100 CAPSULE ORAL 2 TIMES DAILY
Status: DISCONTINUED | OUTPATIENT
Start: 2021-01-01 | End: 2021-01-02

## 2021-01-01 RX ORDER — ALBUTEROL SULFATE 2.5 MG/3ML
2.5 SOLUTION RESPIRATORY (INHALATION) EVERY 6 HOURS PRN
Status: DISCONTINUED | OUTPATIENT
Start: 2021-01-01 | End: 2021-01-08 | Stop reason: HOSPADM

## 2021-01-01 RX ORDER — 0.9 % SODIUM CHLORIDE 0.9 %
30 INTRAVENOUS SOLUTION INTRAVENOUS PRN
Status: DISCONTINUED | OUTPATIENT
Start: 2021-01-01 | End: 2021-01-08 | Stop reason: HOSPADM

## 2021-01-01 RX ORDER — ETHAMBUTOL HYDROCHLORIDE 400 MG/1
400 TABLET, FILM COATED ORAL 2 TIMES DAILY
Status: DISCONTINUED | OUTPATIENT
Start: 2021-01-01 | End: 2021-01-02

## 2021-01-01 RX ORDER — DEXAMETHASONE SODIUM PHOSPHATE 10 MG/ML
6 INJECTION, SOLUTION INTRAMUSCULAR; INTRAVENOUS DAILY
Status: DISCONTINUED | OUTPATIENT
Start: 2021-01-02 | End: 2021-01-02 | Stop reason: CLARIF

## 2021-01-01 RX ORDER — SODIUM CHLORIDE 0.9 % (FLUSH) 0.9 %
10 SYRINGE (ML) INJECTION EVERY 12 HOURS SCHEDULED
Status: DISCONTINUED | OUTPATIENT
Start: 2021-01-01 | End: 2021-01-08 | Stop reason: HOSPADM

## 2021-01-01 RX ORDER — PROMETHAZINE HYDROCHLORIDE 25 MG/1
12.5 TABLET ORAL EVERY 6 HOURS PRN
Status: DISCONTINUED | OUTPATIENT
Start: 2021-01-01 | End: 2021-01-08 | Stop reason: HOSPADM

## 2021-01-01 RX ADMIN — ACETAMINOPHEN 650 MG: 325 TABLET ORAL at 23:55

## 2021-01-01 RX ADMIN — Medication 10 ML: at 20:45

## 2021-01-01 RX ADMIN — SODIUM CHLORIDE, POTASSIUM CHLORIDE, SODIUM LACTATE AND CALCIUM CHLORIDE 500 ML: 600; 310; 30; 20 INJECTION, SOLUTION INTRAVENOUS at 13:09

## 2021-01-01 RX ADMIN — SODIUM CHLORIDE 1000 ML: 9 INJECTION, SOLUTION INTRAVENOUS at 13:10

## 2021-01-01 RX ADMIN — DEXAMETHASONE SODIUM PHOSPHATE 6 MG: 10 INJECTION, SOLUTION INTRAMUSCULAR; INTRAVENOUS at 11:31

## 2021-01-01 RX ADMIN — REMDESIVIR 200 MG: 100 INJECTION, POWDER, LYOPHILIZED, FOR SOLUTION INTRAVENOUS at 13:10

## 2021-01-01 RX ADMIN — SODIUM CHLORIDE 1000 ML: 9 INJECTION, SOLUTION INTRAVENOUS at 10:12

## 2021-01-01 RX ADMIN — RIFAMPIN 300 MG: 300 CAPSULE ORAL at 20:47

## 2021-01-01 RX ADMIN — ALBUTEROL SULFATE 2.5 MG: 2.5 SOLUTION RESPIRATORY (INHALATION) at 10:40

## 2021-01-01 RX ADMIN — IPRATROPIUM BROMIDE 0.5 MG: 0.5 SOLUTION RESPIRATORY (INHALATION) at 10:40

## 2021-01-01 RX ADMIN — ONDANSETRON 4 MG: 2 INJECTION INTRAMUSCULAR; INTRAVENOUS at 10:12

## 2021-01-01 RX ADMIN — ENOXAPARIN SODIUM 40 MG: 40 INJECTION SUBCUTANEOUS at 20:46

## 2021-01-01 RX ADMIN — CLARITHROMYCIN 500 MG: 500 TABLET ORAL at 20:47

## 2021-01-01 ASSESSMENT — PAIN DESCRIPTION - LOCATION
LOCATION: HEAD
LOCATION: CHEST

## 2021-01-01 ASSESSMENT — PAIN DESCRIPTION - PAIN TYPE
TYPE: ACUTE PAIN
TYPE: ACUTE PAIN

## 2021-01-01 ASSESSMENT — ENCOUNTER SYMPTOMS
COUGH: 1
SINUS PRESSURE: 0
WHEEZING: 0
SHORTNESS OF BREATH: 1
ABDOMINAL PAIN: 0
EYE DISCHARGE: 0
NAUSEA: 1
SORE THROAT: 0
EYE REDNESS: 0
VOMITING: 0
DIARRHEA: 0
BACK PAIN: 0
EYE PAIN: 0

## 2021-01-01 ASSESSMENT — PAIN DESCRIPTION - DESCRIPTORS
DESCRIPTORS: ACHING
DESCRIPTORS: TIGHTNESS

## 2021-01-01 ASSESSMENT — PAIN SCALES - GENERAL
PAINLEVEL_OUTOF10: 5
PAINLEVEL_OUTOF10: 0

## 2021-01-01 NOTE — LETTER
Beneficiary Notification Letter  BPCI Advanced     Your Doctor or [address],    We wanted to let you know that your health care provider, [address] [place] [address] [place], has volunteered to take part in our [facility] for [facility] & Medicaid Services (CMS) Bundled Payments for [facility] (BPCI Advanced). This doesnt change your Medicare rights or benefits and you dont need to do anything. What are bundled payments? A bundled payment combines, or bundles together, payments that Medicare makes to your health care providers for the many different kinds of medical services you might get in a specific time period. In BPCI Advanced, this time period could include a hospital inpatient stay or outpatient procedure, plus 90 days. Why would Medicare bundle payments? Bundled payments are thought of as a value-based way to pay because health care providers are responsible for both the quality and cost of medical care they give. This is a relatively new way of paying health care providers compared to thefee-for-service way Medicare has traditionally paid, where providers are paid separately for each service they provide. Bundled payments encourage these providers to work together to provide better, more coordinated care during your hospital stay, or outpatient procedure, and through your recovery. What does BPCI Advance mean for me? Youre more likely to get even better care when hospitals, doctors, and other health care providers work together. In BPCI Advanced, hospitals, doctors, and other health care providers may be rewarded for providing better, more coordinated health care. Medicare will watch BPCI Advanced participants closely to make sure that you and other patients keep getting efficient, high quality care. What do I need to know about BPCI Advanced? Whats most important for you to know is that your Medicare rights and benefits wont change because your health care provider is participating in [address]. Medicare will keep covering all of your medically necessary services. Even though Medicare will pay your doctor in a different way under BPCI Advanced, how much you have to pay wont change. Health care providers and suppliers who are enrolled in Medicare will submit their Medicare claims like they always have. Youll have all the same Medicare rights and protections, including the right to choose which hospital, doctor, or other health care provider you see. If you dont want to get care from a health care provider whos participating in [address], then youll have to choose a different health care provider whos not participating in the Model. How can I give feedback about my health care? Medicare might ask you to take a voluntary survey about the services and care you received from [address] [place] [address] during your hospital stay or outpatient procedure and for a specific period of time afterwards. You can decide whether you want to take the voluntary survey, but if you do, itll help Medicare make BPCI Advanced and the care of other Medicare patients better. If you have concerns or complaints about your care, you can:   · Talk to your doctor or health care provider. · Contact your Beneficiary and Family Centered Care Quality Improvement   Organization [employer]IMBER[employer] [facility]STER [facility]). You can get your BFCC-QIOs phone number  at  Medicare.gov/contacts or by calling 1-800-MEDICARE. TTY users can call  7-774.906.9238. Where can I learn more about BPCI Advanced? Learn more about BPCI Advanced at https://innovation.cms.gov/initiatives/bpci-advanced/:  · A list of all the hospitals and physician group practices in the country participating in [address]. · All of the inpatient and outpatient Clinical Episodes that are currently included under BPCI Advanced. A Clinical Episode is a grouping of medical conditions or diagnoses that are included in the [address] [facility].

## 2021-01-01 NOTE — ED NOTES
Bed: 05  Expected date:   Expected time:   Means of arrival:   Comments:  [address] Jorge  01/01/21 4719

## 2021-01-01 NOTE — Clinical Note
Patient Class: Inpatient [101]   REQUIRED: Diagnosis: Acute hypoxemic respiratory failure ([facility] [[id number]]   Estimated Length of Stay: Estimated stay of more than 2 midnights   Telemetry Bed Required?: Yes

## 2021-01-01 NOTE — ED NOTES
ATTENDING PROVIDER ATTESTATION:     Fito Mejia presented to the emergency department for evaluation of Fever (temp 104.2 last night), Shortness of Breath (sob off and on, today was the worse, sats 80% r/a), Headache (headache yesterday pm), and Nausea (nausea this am)   and was initially evaluated by the Medical Resident. See Original ED Note for H&P and ED course above. I have reviewed and discussed the case, including pertinent history (medical, surgical, family and social) and exam findings with the Medical Resident assigned to Fito Mejia. I have personally performed and/or participated in the history, exam, medical decision making, and procedures and agree with all pertinent clinical information. I, Dr. Opal Wilkes MD, am the primary provider of this record. I have reviewed my findings and recommendations with the assigned Medical Resident, Fito Mejia and members of family present at the time of disposition. My findings/plan: The primary encounter diagnosis was Acute on chronic respiratory failure with hypoxia ([facility] [address] Diagnoses of Suspected COVID-19 virus infection, Leukopenia, unspecified type, and Hypoxia were also pertinent to this visit.   New Prescriptions    No medications on file     Opal Wilkes MD      HPI 81-year-old male patient presented to the emergency department with worsening shortness of breath over the last 3 days. Shortness of breath is worse with exertion and better with rest. Symptoms have been moderate in severity and constant since onset. He reports associated symptoms of fever as well as cough during those last couple of days. He has a history of COPD on 3.5 L at night. Patient states he has a baseline oxygen saturation of 83 to 84% on room air. Also has history of Mycobacterium avium complex. He is on rifampin, ethambutol, clarithromycin chronically and sees infectious disease regularly. Over the last few days and shortness of breath has been worsening. No associated chest pain abdominal pain. He does note some nausea. He has been using Tylenol at home which has seemed to improve his fevers. He has not recently been tested for Covid. No known exposures to COVID-19. No history of blood clots in the past.  He is not on any blood thinner medications. No history of cancer denies any recent travel or surgeries. Review of Systems   Constitutional: Positive for activity change and fever. Negative for chills. HENT: Negative for ear pain, sinus pressure and sore throat. Eyes: Negative for pain, discharge and redness. Respiratory: Positive for cough and shortness of breath. Negative for wheezing. Cardiovascular: Negative for chest pain. Gastrointestinal: Positive for nausea. Negative for abdominal pain, diarrhea and vomiting. Genitourinary: Negative for dysuria and frequency. Musculoskeletal: Negative for arthralgias and back pain. Skin: Negative for wound. Neurological: Positive for headaches. Negative for weakness. Hematological: Negative for adenopathy. All other systems reviewed and are negative. Physical Exam  Vitals signs and nursing note reviewed. Constitutional:       Appearance: He is well-developed. HENT:      Head: Normocephalic and atraumatic.    Eyes: Conjunctiva/sclera: Conjunctivae normal.   Neck:      Musculoskeletal: Normal range of motion and neck supple. Cardiovascular:      Rate and Rhythm: Normal rate and regular rhythm. Heart sounds: Normal heart sounds. No murmur. Pulmonary:      Effort: Pulmonary effort is normal. No respiratory distress. Breath sounds: Examination of the right-upper field reveals decreased breath sounds. Examination of the left-upper field reveals decreased breath sounds. Examination of the right-middle field reveals decreased breath sounds. Examination of the left-middle field reveals decreased breath sounds. Examination of the right-lower field reveals decreased breath sounds. Examination of the left-lower field reveals decreased breath sounds. Decreased breath sounds present. No wheezing or rales. Abdominal:      General: Bowel sounds are normal.      Palpations: Abdomen is soft. Tenderness: There is no abdominal tenderness. There is no guarding or rebound. Musculoskeletal:         General: No tenderness or deformity. Skin:     General: Skin is warm and dry. Neurological:      Mental Status: He is alert and oriented to person, place, and time. Cranial Nerves: No cranial nerve deficit.       Coordination: Coordination normal.          Procedures     MDM 82-year-old male patient here with shortness of breath. His dad has a history of COPD and Mycobacterium avium complex. Patient is being followed by infectious disease. He is on rifampin, ethambutol, clarithromycin. Patient is generally at home 84% on room air. He only uses oxygen at night on 3-1/2 L. Patient says over the last few days he has been increasingly short of breath as well as having fevers. He reports next after yesterday of 104 °F.  He also states that he has been having to use his oxygen during the daytime. Here in the emergency department patient arrived 79% on room air and blood pressure of around 97 systolic. From prior notes patient's usual oxygen saturation is in the mid 80s and his blood pressure usually runs low. Here he was placed on nasal cannula oxygen and fluids. Blood pressure did not change after 1 L of fluids. On 5 L of oxygen patient's O2 saturation significantly improved. Patient's chest x-ray consistent with patchy bilateral pneumonia, white blood cell count 3.8. Patient given Decadron here in the ED due to high clinical suspicion for COVID-19. Patient will be admitted for acute on chronic hypoxic respiratory failure, likely suspicion for Covid(pending respiratory array). Spoke with Dr. Anitha Lindquist who is agreeable to admit patient. EKG: This EKG is signed and interpreted by me. Rate: 80  Rhythm: Sinus, low voltage  Interpretation: no acute abnormalities, PVC present  Comparison: no previous EKG  ED Course as of Jan 01 0944 Fri Jan 01, 2021   4668 EKG: This EKG is signed and interpreted by me.     Rate: 80  Rhythm: Sinus  Interpretation: Sinus rhythm, PVCs, normal MN interval, normal QRS, prolonged QT interval, no acute ST or T wave changes  Comparison: no previous EKG available        [JA]      ED Course User Index  [KATHARINE] Amanda Stoll MD        --------------------------------------------- PAST HISTORY --------------------------------------------- Past Medical History:  has a past medical history of Acute and chronic respiratory failure with hypoxia (HCC), Acute respiratory failure with hypoxia (HCC), Bullous emphysema (HCC), Chronic back pain, Colon cancer screening, COPD (chronic obstructive pulmonary disease) ([facility] [address] Cough with hemoptysis, 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 Past Surgical History:  has a past surgical history that includes Abscess Drainage (2006); Colonoscopy (11/18/2013); lumbar fusion (03/2019); bronchoscopy (N/A, 6/12/2019); and bronchoscopy (N/A, 10/7/2019). Social History:  reports that he quit smoking about 6 years ago. His smoking use included cigarettes. He started smoking about 52 years ago. He has a 92.00 pack-year smoking history. He has never used smokeless tobacco. He reports that he does not drink alcohol or use drugs. Family History: family history includes Other in his father and mother. The patients home medications have been reviewed. Allergies: Patient has no known allergies.     -------------------------------------------------- RESULTS -------------------------------------------------    LABS:  Results for orders placed or performed during the hospital encounter of 01/01/21   CBC Auto Differential   Result Value Ref Range    WBC 3.8 (L) 4.5 - 11.5 E9/L    RBC 5.76 3.80 - 5.80 E12/L    Hemoglobin 16.9 (H) 12.5 - 16.5 g/dL    Hematocrit 53.0 37.0 - 54.0 %    MCV 92.0 80.0 - 99.9 fL    MCH 29.3 26.0 - 35.0 pg    MCHC 31.9 (L) 32.0 - 34.5 %    RDW 14.8 11.5 - 15.0 fL Platelets 805 513 - 862 E9/L    MPV 10.4 7.0 - 12.0 fL    Neutrophils % 87.1 (H) 43.0 - 80.0 %    Lymphocytes % 3.4 (L) 20.0 - 42.0 %    Monocytes % 5.2 2.0 - 12.0 %    Eosinophils % 0.0 0.0 - 6.0 %    Basophils % 0.9 0.0 - 2.0 %    Neutrophils Absolute 3.31 1.80 - 7.30 E9/L    Lymphocytes Absolute 0.27 (L) 1.50 - 4.00 E9/L    Monocytes Absolute 0.19 0.10 - 0.95 E9/L    Eosinophils Absolute 0.00 (L) 0.05 - 0.50 E9/L    Basophils Absolute 0.03 0.00 - 0.20 E9/L    Atypical Lymphocytes Relative 3.4 0.0 - 4.0 %    nRBC 0.0 /100 WBC    RBC Morphology Normal    Comprehensive Metabolic Panel w/ Reflex to MG   Result Value Ref Range    Sodium 134 132 - 146 mmol/L    Potassium reflex Magnesium 4.1 3.5 - 5.0 mmol/L    Chloride 102 98 - 107 mmol/L    CO2 24 22 - 29 mmol/L    Anion Gap 8 7 - 16 mmol/L    Glucose 104 (H) 74 - 99 mg/dL    BUN 13 8 - 23 mg/dL    CREATININE 0.9 0.7 - 1.2 mg/dL    GFR Non-African American >60 >=60 mL/min/1.73    GFR African American >60     Calcium 9.2 8.6 - 10.2 mg/dL    Total Protein 6.7 6.4 - 8.3 g/dL    Alb 4.0 3.5 - 5.2 g/dL    Total Bilirubin 0.2 0.0 - 1.2 mg/dL    Alkaline Phosphatase 67 40 - 129 U/L    ALT 8 0 - 40 U/L    AST 17 0 - 39 U/L   Troponin   Result Value Ref Range    Troponin <0.01 0.00 - 0.03 ng/mL   Lactate, Sepsis   Result Value Ref Range    Lactic Acid, Sepsis 1.2 0.5 - 1.9 mmol/L   EKG 12 Lead   Result Value Ref Range    Ventricular Rate 80 BPM    Atrial Rate 80 BPM    P-R Interval 176 ms    QRS Duration 106 ms    Q-T Interval 424 ms    QTc Calculation (Bazett) 489 ms    P Axis 46 degrees    R Axis 38 degrees    T Axis 53 degrees       RADIOLOGY:  US DUP LOWER EXTREMITY LEFT MARINA   Final Result   No evidence of DVT in the left lower extremity. XR CHEST PORTABLE   Final Result   1. Patchy bilateral pneumonia   2.  Advanced emphysematous changes with interstitial fibrosis.            ------------------------- NURSING NOTES AND VITALS REVIEWED --------------------------- Date / Time Roomed:  1/1/2021  8:48 AM  ED Bed Assignment:  05/05    The nursing notes within the ED encounter and vital signs as below have been reviewed. Patient Vitals for the past 24 hrs:   BP Temp Temp src Pulse Resp SpO2 Height Weight   01/01/21 1127 (!) 92/57 98.4 °F (36.9 °C) Oral 76 20 97 %     01/01/21 1015 (!) 83/57   73 20 95 %     01/01/21 0959 (!) 80/52   77 20 92 %     01/01/21 0920 (!) 97/52 98.8 °F (37.1 °C) Oral 87 20 (!) 79 % 6' 1\" (1.854 m) 200 lb (90.7 kg)       Oxygen Saturation Interpretation: Abnormal and Improved after treatment    ------------------------------------------ PROGRESS NOTES ------------------------------------------  Re-evaluation(s):  Time: 1200  Patients symptoms are improving  Repeat physical examination is improved    Counseling:  I have spoken with the patient and discussed todays results, in addition to providing specific details for the plan of care and counseling regarding the diagnosis and prognosis. Their questions are answered at this time and they are agreeable with the plan of admission.    --------------------------------- ADDITIONAL PROVIDER NOTES ---------------------------------  Consultations:  Time: 1230. Spoke with Dr. Giselle Schmitt. Discussed case. They will admit the patient. This patient's ED course included: a personal history and physicial examination, multiple bedside re-evaluations, IV medications, cardiac monitoring and continuous pulse oximetry    This patient has remained hemodynamically stable during their ED course. Diagnosis:  1. Acute on chronic respiratory failure with hypoxia (HCC)    2. Suspected COVID-19 virus infection    3. Leukopenia, unspecified type    4. Hypoxia        Disposition:  Patient's disposition: Admit to telemetry  Patient's condition is stable.              Carmelita Siu DO  Resident  01/01/21 88 [facility]  Resident  01/01/21 [id number]       Nando Espinoza MD  01/01/21 [id number]

## 2021-01-01 NOTE — ED PROVIDER NOTES
ATTENDING PROVIDER ATTESTATION:      Fadumo Rogers presented to the emergency department for evaluation of Fever (temp 104.2 last night), Shortness of Breath (sob off and on, today was the worse, sats 80% r/a), Headache (headache yesterday pm), and Nausea (nausea this am)   and was initially evaluated by the Medical Resident. See Original ED Note for H&P and ED course above. I have reviewed and discussed the case, including pertinent history (medical, surgical, family and social) and exam findings with the Medical Resident assigned to Fadumo Rogers. I have personally performed and/or participated in the history, exam, medical decision making, and procedures and agree with all pertinent clinical information.      I, Dr. Cecilia Alvarado MD, am the primary provider of this record.                 I have reviewed my findings and recommendations with the assigned Medical Resident, Fadumo Rogers and members of family present at the time of disposition. My findings/plan: The primary encounter diagnosis was Acute on chronic respiratory failure with hypoxia ([facility] [address] Diagnoses of Suspected COVID-19 virus infection, Leukopenia, unspecified type, and Hypoxia were also pertinent to this visit.       New Prescriptions     No medications on file      Cecilia Alvarado MD        HPI 72-year-old male patient presented to the emergency department with worsening shortness of breath over the last 3 days. Shortness of breath is worse with exertion and better with rest. Symptoms have been moderate in severity and constant since onset. He reports associated symptoms of fever as well as cough during those last couple of days. He has a history of COPD on 3.5 L at night. Patient states he has a baseline oxygen saturation of 83 to 84% on room air. Also has history of Mycobacterium avium complex. He is on rifampin, ethambutol, clarithromycin chronically and sees infectious disease regularly. Over the last few days and shortness of breath has been worsening. No associated chest pain abdominal pain. He does note some nausea. He has been using Tylenol at home which has seemed to improve his fevers. He has not recently been tested for Covid. No known exposures to COVID-19. No history of blood clots in the past.  He is not on any blood thinner medications. No history of cancer denies any recent travel or surgeries.     Review of Systems   Constitutional: Positive for activity change and fever. Negative for chills. HENT: Negative for ear pain, sinus pressure and sore throat. Eyes: Negative for pain, discharge and redness. Respiratory: Positive for cough and shortness of breath. Negative for wheezing. Cardiovascular: Negative for chest pain. Gastrointestinal: Positive for nausea. Negative for abdominal pain, diarrhea and vomiting. Genitourinary: Negative for dysuria and frequency. Musculoskeletal: Negative for arthralgias and back pain. Skin: Negative for wound. Neurological: Positive for headaches. Negative for weakness. Hematological: Negative for adenopathy. All other systems reviewed and are negative.        Physical Exam  Vitals signs and nursing note reviewed. Constitutional:       Appearance: He is well-developed. HENT:      Head: Normocephalic and atraumatic.    Eyes: Conjunctiva/sclera: Conjunctivae normal.   Neck:      Musculoskeletal: Normal range of motion and neck supple. Cardiovascular:      Rate and Rhythm: Normal rate and regular rhythm. Heart sounds: Normal heart sounds. No murmur. Pulmonary:      Effort: Pulmonary effort is normal. No respiratory distress. Breath sounds: Examination of the right-upper field reveals decreased breath sounds. Examination of the left-upper field reveals decreased breath sounds. Examination of the right-middle field reveals decreased breath sounds. Examination of the left-middle field reveals decreased breath sounds. Examination of the right-lower field reveals decreased breath sounds. Examination of the left-lower field reveals decreased breath sounds. Decreased breath sounds present. No wheezing or rales. Abdominal:      General: Bowel sounds are normal.      Palpations: Abdomen is soft. Tenderness: There is no abdominal tenderness. There is no guarding or rebound. Musculoskeletal:         General: No tenderness or deformity. Skin:     General: Skin is warm and dry. Neurological:      Mental Status: He is alert and oriented to person, place, and time. Cranial Nerves: No cranial nerve deficit.       Coordination: Coordination normal.          Procedures      MDM 63-year-old male patient here with shortness of breath. His dad has a history of COPD and Mycobacterium avium complex. Patient is being followed by infectious disease. He is on rifampin, ethambutol, clarithromycin. Patient is generally at home 84% on room air. He only uses oxygen at night on 3-1/2 L. Patient says over the last few days he has been increasingly short of breath as well as having fevers. He reports next after yesterday of 104 °F.  He also states that he has been having to use his oxygen during the daytime. Here in the emergency department patient arrived 79% on room air and blood pressure of around 97 systolic. From prior notes patient's usual oxygen saturation is in the mid 80s and his blood pressure usually runs low. Here he was placed on nasal cannula oxygen and fluids. Blood pressure did not change after 1 L of fluids. On 5 L of oxygen patient's O2 saturation significantly improved. Patient's chest x-ray consistent with patchy bilateral pneumonia, white blood cell count 3.8. Patient given Decadron here in the ED due to high clinical suspicion for COVID-19. Patient will be admitted for acute on chronic hypoxic respiratory failure, likely suspicion for Covid(pending respiratory array). Spoke with Dr. Evonne Nyhan who is agreeable to admit patient.     EKG: This EKG is signed and interpreted by me. Rate: 80  Rhythm: Sinus, low voltage  Interpretation: no acute abnormalities, PVC present  Comparison: no previous EKG      ED Course as of Jan 01 0944 Fri Jan 01, 2021   1054 EKG: This EKG is signed and interpreted by me.     Rate: 80  Rhythm: Sinus  Interpretation: Sinus rhythm, PVCs, normal MI interval, normal QRS, prolonged QT interval, no acute ST or T wave changes  Comparison: no previous EKG available         [JA]       ED Course User Index  [KATHARINE] Lakeshia Ceballos MD         --------------------------------------------- PAST HISTORY ---------------------------------------------   RDW 14.8 11.5 - 15.0 fL     Platelets 042 138 - 845 E9/L     MPV 10.4 7.0 - 12.0 fL     Neutrophils % 87.1 (H) 43.0 - 80.0 %     Lymphocytes % 3.4 (L) 20.0 - 42.0 %     Monocytes % 5.2 2.0 - 12.0 %     Eosinophils % 0.0 0.0 - 6.0 %     Basophils % 0.9 0.0 - 2.0 %     Neutrophils Absolute 3.31 1.80 - 7.30 E9/L     Lymphocytes Absolute 0.27 (L) 1.50 - 4.00 E9/L     Monocytes Absolute 0.19 0.10 - 0.95 E9/L     Eosinophils Absolute 0.00 (L) 0.05 - 0.50 E9/L     Basophils Absolute 0.03 0.00 - 0.20 E9/L     Atypical Lymphocytes Relative 3.4 0.0 - 4.0 %     nRBC 0.0 /100 WBC     RBC Morphology Normal     Comprehensive Metabolic Panel w/ Reflex to MG   Result Value Ref Range     Sodium 134 132 - 146 mmol/L     Potassium reflex Magnesium 4.1 3.5 - 5.0 mmol/L     Chloride 102 98 - 107 mmol/L     CO2 24 22 - 29 mmol/L     Anion Gap 8 7 - 16 mmol/L     Glucose 104 (H) 74 - 99 mg/dL     BUN 13 8 - 23 mg/dL     CREATININE 0.9 0.7 - 1.2 mg/dL     GFR Non-African American >60 >=60 mL/min/1.73     GFR African American >60       Calcium 9.2 8.6 - 10.2 mg/dL     Total Protein 6.7 6.4 - 8.3 g/dL     Alb 4.0 3.5 - 5.2 g/dL     Total Bilirubin 0.2 0.0 - 1.2 mg/dL     Alkaline Phosphatase 67 40 - 129 U/L     ALT 8 0 - 40 U/L     AST 17 0 - 39 U/L   Troponin   Result Value Ref Range     Troponin <0.01 0.00 - 0.03 ng/mL   Lactate, Sepsis   Result Value Ref Range     Lactic Acid, Sepsis 1.2 0.5 - 1.9 mmol/L   EKG 12 Lead   Result Value Ref Range     Ventricular Rate 80 BPM     Atrial Rate 80 BPM     P-R Interval 176 ms     QRS Duration 106 ms     Q-T Interval 424 ms     QTc Calculation (Bazett) 489 ms     P Axis 46 degrees     R Axis 38 degrees     T Axis 53 degrees         RADIOLOGY:  US DUP LOWER EXTREMITY LEFT MARINA   Final Result   No evidence of DVT in the left lower extremity.       XR CHEST PORTABLE   Final Result   1. Patchy bilateral pneumonia   2. Advanced emphysematous changes with interstitial fibrosis.                ------------------------- NURSING NOTES AND VITALS REVIEWED ---------------------------  Date / Time Roomed:  1/1/2021  8:48 AM  ED Bed Assignment:  05/05     The nursing notes within the ED encounter and vital signs as below have been reviewed.      Patient Vitals for the past 24 hrs:    BP Temp Temp src Pulse Resp SpO2 Height Weight   01/01/21 1127 (!) 92/57 98.4 °F (36.9 °C) Oral 76 20 97 %     01/01/21 1015 (!) 83/57   73 20 95 %     01/01/21 0959 (!) 80/52   77 20 92 %     01/01/21 0920 (!) 97/52 98.8 °F (37.1 °C) Oral 87 20 (!) 79 % 6' 1\" (1.854 m) 200 lb (90.7 kg)         Oxygen Saturation Interpretation: Abnormal and Improved after treatment     ------------------------------------------ PROGRESS NOTES ------------------------------------------  Re-evaluation(s):  Time: 1200  Patients symptoms are improving  Repeat physical examination is improved     Counseling:  I have spoken with the patient and discussed todays results, in addition to providing specific details for the plan of care and counseling regarding the diagnosis and prognosis. Their questions are answered at this time and they are agreeable with the plan of admission.     --------------------------------- ADDITIONAL PROVIDER NOTES ---------------------------------  Consultations:  Time: 1230. Spoke with Dr. Nicolas Gandara. Discussed case. They will admit the patient. This patient's ED course included: a personal history and physicial examination, multiple bedside re-evaluations, IV medications, cardiac monitoring and continuous pulse oximetry     This patient has remained hemodynamically stable during their ED course.     Diagnosis:  1. Acute on chronic respiratory failure with hypoxia (HCC)    2. Suspected COVID-19 virus infection    3. Leukopenia, unspecified type    4.  Hypoxia          Disposition:  Patient's disposition: Admit to telemetry  Patient's condition is stable.                 Jaxson Lindo DO  Resident  01/01/21 [id number] Lucho Gardner,   Resident  01/01/21 [id number]

## 2021-01-02 LAB
ALBUMIN SERPL-MCNC: 3.5 G/DL (ref 3.5–5.2)
ALP BLD-CCNC: 56 U/L (ref 40–129)
ALT SERPL-CCNC: 7 U/L (ref 0–40)
ANION GAP SERPL CALCULATED.3IONS-SCNC: 10 MMOL/L (ref 7–16)
AST SERPL-CCNC: 19 U/L (ref 0–39)
BASOPHILS ABSOLUTE: 0.01 E9/L (ref 0–0.2)
BASOPHILS RELATIVE PERCENT: 0.3 % (ref 0–2)
BILIRUB SERPL-MCNC: 0.2 MG/DL (ref 0–1.2)
BUN BLDV-MCNC: 13 MG/DL (ref 8–23)
C-REACTIVE PROTEIN: 0.7 MG/DL (ref 0–0.4)
CALCIUM SERPL-MCNC: 8.5 MG/DL (ref 8.6–10.2)
CHLORIDE BLD-SCNC: 104 MMOL/L (ref 98–107)
CO2: 20 MMOL/L (ref 22–29)
CREAT SERPL-MCNC: 0.7 MG/DL (ref 0.7–1.2)
D DIMER: 246 NG/ML DDU
EOSINOPHILS ABSOLUTE: 0 E9/L (ref 0.05–0.5)
EOSINOPHILS RELATIVE PERCENT: 0 % (ref 0–6)
FERRITIN: 79 NG/ML
FIBRINOGEN: 372 MG/DL (ref 225–540)
GFR AFRICAN AMERICAN: >60
GFR NON-AFRICAN AMERICAN: >60 ML/MIN/1.73
GLUCOSE BLD-MCNC: 89 MG/DL (ref 74–99)
HCT VFR BLD CALC: 49.1 % (ref 37–54)
HEMOGLOBIN: 16.1 G/DL (ref 12.5–16.5)
IMMATURE GRANULOCYTES #: 0.01 E9/L
IMMATURE GRANULOCYTES %: 0.3 % (ref 0–5)
LACTATE DEHYDROGENASE: 245 U/L (ref 135–225)
LYMPHOCYTES ABSOLUTE: 0.83 E9/L (ref 1.5–4)
LYMPHOCYTES RELATIVE PERCENT: 26 % (ref 20–42)
MCH RBC QN AUTO: 29.8 PG (ref 26–35)
MCHC RBC AUTO-ENTMCNC: 32.8 % (ref 32–34.5)
MCV RBC AUTO: 90.9 FL (ref 80–99.9)
MONOCYTES ABSOLUTE: 0.41 E9/L (ref 0.1–0.95)
MONOCYTES RELATIVE PERCENT: 12.9 % (ref 2–12)
NEUTROPHILS ABSOLUTE: 1.93 E9/L (ref 1.8–7.3)
NEUTROPHILS RELATIVE PERCENT: 60.5 % (ref 43–80)
PDW BLD-RTO: 15.1 FL (ref 11.5–15)
PLATELET # BLD: 118 E9/L (ref 130–450)
PMV BLD AUTO: 11.1 FL (ref 7–12)
POTASSIUM REFLEX MAGNESIUM: 3.8 MMOL/L (ref 3.5–5)
PROCALCITONIN: 0.09 NG/ML (ref 0–0.08)
RBC # BLD: 5.4 E12/L (ref 3.8–5.8)
REASON FOR REJECTION: NORMAL
REJECTED TEST: NORMAL
SEDIMENTATION RATE, ERYTHROCYTE: 0 MM/HR (ref 0–15)
SODIUM BLD-SCNC: 134 MMOL/L (ref 132–146)
TOTAL PROTEIN: 5.6 G/DL (ref 6.4–8.3)
TROPONIN: <0.01 NG/ML (ref 0–0.03)
WBC # BLD: 3.2 E9/L (ref 4.5–11.5)

## 2021-01-02 PROCEDURE — 85651 RBC SED RATE NONAUTOMATED: CPT

## 2021-01-02 PROCEDURE — 85384 FIBRINOGEN ACTIVITY: CPT

## 2021-01-02 PROCEDURE — 2580000003 HC RX 258: Performed by: INTERNAL MEDICINE

## 2021-01-02 PROCEDURE — 2700000000 HC OXYGEN THERAPY PER DAY

## 2021-01-02 PROCEDURE — 83615 LACTATE (LD) (LDH) ENZYME: CPT

## 2021-01-02 PROCEDURE — 87206 SMEAR FLUORESCENT/ACID STAI: CPT

## 2021-01-02 PROCEDURE — 87116 MYCOBACTERIA CULTURE: CPT

## 2021-01-02 PROCEDURE — 94640 AIRWAY INHALATION TREATMENT: CPT

## 2021-01-02 PROCEDURE — 83520 IMMUNOASSAY QUANT NOS NONAB: CPT

## 2021-01-02 PROCEDURE — 87015 SPECIMEN INFECT AGNT CONCNTJ: CPT

## 2021-01-02 PROCEDURE — 82728 ASSAY OF FERRITIN: CPT

## 2021-01-02 PROCEDURE — 84145 PROCALCITONIN (PCT): CPT

## 2021-01-02 PROCEDURE — 2060000000 HC ICU INTERMEDIATE R&B

## 2021-01-02 PROCEDURE — 86140 C-REACTIVE PROTEIN: CPT

## 2021-01-02 PROCEDURE — 6370000000 HC RX 637 (ALT 250 FOR IP): Performed by: INTERNAL MEDICINE

## 2021-01-02 PROCEDURE — 85378 FIBRIN DEGRADE SEMIQUANT: CPT

## 2021-01-02 PROCEDURE — 85025 COMPLETE CBC W/AUTO DIFF WBC: CPT

## 2021-01-02 PROCEDURE — 80053 COMPREHEN METABOLIC PANEL: CPT

## 2021-01-02 PROCEDURE — 2500000003 HC RX 250 WO HCPCS: Performed by: INTERNAL MEDICINE

## 2021-01-02 PROCEDURE — 6360000002 HC RX W HCPCS: Performed by: INTERNAL MEDICINE

## 2021-01-02 PROCEDURE — 6360000002 HC RX W HCPCS: Performed by: SPECIALIST

## 2021-01-02 PROCEDURE — XW033E5 INTRODUCTION OF REMDESIVIR ANTI-INFECTIVE INTO PERIPHERAL VEIN, PERCUTANEOUS APPROACH, NEW TECHNOLOGY GROUP 5: ICD-10-PCS | Performed by: INTERNAL MEDICINE

## 2021-01-02 PROCEDURE — 36415 COLL VENOUS BLD VENIPUNCTURE: CPT

## 2021-01-02 PROCEDURE — 84484 ASSAY OF TROPONIN QUANT: CPT

## 2021-01-02 RX ORDER — ETHAMBUTOL HYDROCHLORIDE 400 MG/1
800 TABLET, FILM COATED ORAL
Status: DISCONTINUED | OUTPATIENT
Start: 2021-01-04 | End: 2021-01-04

## 2021-01-02 RX ORDER — CLARITHROMYCIN 500 MG/1
500 TABLET, COATED ORAL
Status: DISCONTINUED | OUTPATIENT
Start: 2021-01-04 | End: 2021-01-04

## 2021-01-02 RX ADMIN — DEXAMETHASONE 6 MG: 4 TABLET ORAL at 20:30

## 2021-01-02 RX ADMIN — Medication 10 ML: at 20:30

## 2021-01-02 RX ADMIN — REMDESIVIR 100 MG: 100 INJECTION, POWDER, LYOPHILIZED, FOR SOLUTION INTRAVENOUS at 14:15

## 2021-01-02 RX ADMIN — ENOXAPARIN SODIUM 40 MG: 40 INJECTION SUBCUTANEOUS at 20:30

## 2021-01-02 RX ADMIN — BUDESONIDE AND FORMOTEROL FUMARATE DIHYDRATE 2 PUFF: 80; 4.5 AEROSOL RESPIRATORY (INHALATION) at 20:30

## 2021-01-02 RX ADMIN — ACETAMINOPHEN 650 MG: 325 TABLET ORAL at 08:56

## 2021-01-02 RX ADMIN — TIOTROPIUM BROMIDE 18 MCG: 18 CAPSULE ORAL; RESPIRATORY (INHALATION) at 08:51

## 2021-01-02 RX ADMIN — BUDESONIDE AND FORMOTEROL FUMARATE DIHYDRATE 2 PUFF: 80; 4.5 AEROSOL RESPIRATORY (INHALATION) at 08:51

## 2021-01-02 RX ADMIN — DEXAMETHASONE 6 MG: 1 TABLET ORAL at 08:51

## 2021-01-02 RX ADMIN — ALBUTEROL SULFATE 2.5 MG: 2.5 SOLUTION RESPIRATORY (INHALATION) at 00:18

## 2021-01-02 RX ADMIN — Medication 10 ML: at 08:51

## 2021-01-02 ASSESSMENT — PAIN SCALES - GENERAL: PAINLEVEL_OUTOF10: 5

## 2021-01-02 NOTE — H&P
History and Physical      CHIEF COMPLAINT:    Shortness of breath and high-grade fever  History of Present Illness: This is a 33-year-old male with a past history significant for COPD and baseline O2 needs. He also has a history of Mycobacterium avium complex. He is chronically on rifampin ethambutol and clarithromycin. He has not been feeling well over the last couple of days started to feel short of breath and nausea. He was using Tylenol for resolution of fevers. He was seen in the ER and his O2 needs will close to 10 L.   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 ([address] 08/15/2019    First seen by ID; treatment started 9/4/2019    Emphysema lung ([address]     Glucose intolerance 6/10/2019    Hilar adenopathy 6/10/2019    Hypophosphatemia 6/10/2019    Infection due to parainfluenza virus 3 6/10/2019    Left upper lobe pneumonia 10/12/2017    Pleural effusion on right 10/3/2019    Pulmonary emphysema with fibrosis of lung ([address] 11/15/2018    Pulmonary Mycobacterium avium complex (MAC) infection ([address] 07/12/2019    BAL results finally completed this date    Rhinovirus infection 10/16/2020    Right lower lobe pneumonia 11/15/2018    Recurrent 4/23/19    S/P lumbar fusion 6/1/2019    Thoracic ascending aortic aneurysm ([address] 11/15/2018    Proximal ascending aorta; 3.8 cm; 11/15/18         Past Surgical History:   Procedure Laterality Date    ABSCESS DRAINAGE  2006    X2 RECTAL ABSCESS  BRONCHOSCOPY N/A 6/12/2019    BRONCHOSCOPY BRUSHINGS performed by Justin Sandoval MD at [facility] N/A 10/7/2019    BRONCHOSCOPY DIAGNOSTIC OR CELL [address] ONLY performed by Justin Sandoval MD at [facility]  11/18/2013    LUMBAR FUSION  03/2019    [facility]       Medications Prior to Admission:    Medications Prior to Admission: clarithromycin (BIAXIN) 500 MG tablet, Take 500 mg by mouth 2 times daily  ethambutol (MYAMBUTOL) 400 MG tablet, Take 400 mg by mouth 2 times daily  rifAMPin (RIFADIN) 300 MG capsule, Take by mouth 2 times daily  oxyCODONE-acetaminophen (PERCOCET) 7.5-325 MG per tablet, TK 1 T PO BID PRN P  Fluticasone-Umeclidin-Vilant (TRELEGY ELLIPTA) 100-62.5-25 MCG/INH AEPB, Inhale 1 puff into the lungs daily (Patient taking differently: Inhale 1 puff into the lungs every morning )  albuterol sulfate  (90 Base) MCG/ACT inhaler, Inhale 2 puffs into the lungs every 6 hours as needed for Wheezing or Shortness of Breath  OXYGEN, Inhale 3.5 L into the lungs continuous prn   etodolac (LODINE) 500 MG tablet, Take 500 mg by mouth every morning   [DISCONTINUED] pregabalin (LYRICA) 100 MG capsule, Take 100 mg by mouth 2 times daily. Allergies:    Patient has no known allergies. Social History:    reports that he quit smoking about 6 years ago. His smoking use included cigarettes. He started smoking about 52 years ago. He has a 92.00 pack-year smoking history. He has never used smokeless tobacco. He reports that he does not drink alcohol or use drugs. Family History:   family history includes Other in his father and mother.     REVIEW OF SYSTEMS:  Gen: Positive for nausea and decreased appetite  HEENT: Negative for double vision, blurred vision, sore throat   Heart: Positive for shortness of breath  Lungs: Positive for shortness of breath  GI: Negative for nausea, vomiting  : Negative for dysuria, hematuria Endo: History of hypoglycemia in the past  Heme: Negative for DVT or bleeding disorders that he could recall  Psych: Negative for Depression or anxiety  Ortho: Negative for pain in the joints, arthritis or gout    PHYSICAL EXAM:    Vitals:  /61   Pulse 70   Temp 99.6 °F (37.6 °C) (Oral)   Resp 20   Ht 6' 1\" (1.854 m)   Wt 200 lb (90.7 kg)   SpO2 91%   BMI 26.39 kg/m²     General:  Awake, alert, oriented X 3. Well developed, well nourished, well groomed. No apparent distress. HEENT:  Normocephalic, atraumatic. Pupils equal, round, reactive to light. No scleral icterus. No conjunctival injection. Normal lips, teeth, and gums. No nasal discharge. Neck:  Supple  Heart:  RRR, no murmurs, gallops, or rubs  Lungs: Decreased bilateral   abdomen:   Bowel sounds present, soft, nontender, no masses, no organomegaly, no peritoneal signs  Extremities:  No clubbing, cyanosis, or edema  Skin:  Warm and dry, no open lesions or rash  Neuro:  Cranial nerves 2-12 intact, no focal deficits  Breast: deferred  Rectal: deferred  Genitalia:  deferred    LABS:  CBC:   Lab Results   Component Value Date    WBC 3.2 01/02/2021    RBC 5.40 01/02/2021    HGB 16.1 01/02/2021    HCT 49.1 01/02/2021    MCV 90.9 01/02/2021    MCH 29.8 01/02/2021    MCHC 32.8 01/02/2021    RDW 15.1 01/02/2021     01/02/2021    MPV 11.1 01/02/2021     CMP:    Lab Results   Component Value Date     01/02/2021    K 3.8 01/02/2021     01/02/2021    CO2 20 01/02/2021    BUN 13 01/02/2021    CREATININE 0.7 01/02/2021    GFRAA >60 01/02/2021    LABGLOM >60 01/02/2021    GLUCOSE 89 01/02/2021    PROT 5.6 01/02/2021    LABALBU 3.5 01/02/2021    CALCIUM 8.5 01/02/2021    BILITOT 0.2 01/02/2021    ALKPHOS 56 01/02/2021    AST 19 01/02/2021    ALT 7 01/02/2021     Albumin:    Lab Results   Component Value Date    LABALBU 3.5 01/02/2021     Calcium:    Lab Results   Component Value Date    CALCIUM 8.5 01/02/2021 Ionized Calcium:  No results found for: IONCA  Magnesium:    Lab Results   Component Value Date    MG 1.8 10/17/2020     Phosphorus:    Lab Results   Component Value Date    PHOS 2.4 10/17/2020     LDH:  No results found for: LDH  Uric Acid:    Lab Results   Component Value Date    LABURIC 5.7 10/16/2020     PT/INR:    Lab Results   Component Value Date    PROTIME 12.5 05/30/2019    INR 1.1 05/30/2019         ASSESSMENT:      Active Problems:    Acute hypoxemic respiratory failure (HCC)    Hypoxia  Resolved Problems:    * No resolved hospital problems. *    COVID-19  History of Mycobacterium avium intracellular  Chronic opiate use for lumbar surgery   Nausea  PLAN:    1.  O2 needs in place  2. Decadron in place  3. Remdesivir started per pharmacy consult  4. Procalcitonin noted and infectious disease awaited  5. D-dimer noted  6.   Supportive care and pulmonary consult          Please note that over 50 minutes was spent in evaluating the patient, review of records and results, discussion with staff/family, etc.      Electronically signed by Alysa White MD on 1/2/2021 at 8:36 AM

## 2021-01-02 NOTE — CONSULTS
Pulmonary Consultation    Admit Date: 1/1/2021  Requesting Physician: Kylah Borjas MD    Reason for consultation:  · COVID-19 pneumonia  HPI:  · Clearance Diana is a 62-year-old white male with end-stage chronic obstructive pulmonary disease and Mycobacterium avium complex pneumonia who presents now with increasing shortness of breath. The patient notes that he has been vigilant and avoiding sick people with COVID-19 but had 3 relatives over for Thanksgiving. He presents with increasing shortness of breath. Seen and evaluated emergency room, a chest x-ray suggested bilateral infiltrates. COVID-19 testing was positive. · Since admission, the patient was seen and evaluated by infectious disease who follows him routinely as an outpatient. This p.m., the patient is awake alert and conversant on supplemental oxygen. His normal flow rate is about 4 L, tonight he is on 10.     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 ([address] 08/15/2019    First seen by ID; treatment started 9/4/2019    Emphysema lung ([address]     Glucose intolerance 6/10/2019    Hilar adenopathy 6/10/2019    Hypophosphatemia 6/10/2019    Infection due to parainfluenza virus 3 6/10/2019    Left upper lobe pneumonia 10/12/2017    Pleural effusion on right 10/3/2019    Pulmonary emphysema with fibrosis of lung ([address] 11/15/2018    Pulmonary Mycobacterium avium complex (MAC) infection ([address] 07/12/2019    BAL results finally completed this date    Rhinovirus infection 10/16/2020  Right lower lobe pneumonia 11/15/2018    Recurrent 4/23/19    S/P lumbar fusion 6/1/2019    Thoracic ascending aortic aneurysm ([address] 11/15/2018    Proximal ascending aorta; 3.8 cm; 11/15/18     PSH:   Past Surgical History:   Procedure Laterality Date    ABSCESS DRAINAGE  2006    X2 RECTAL ABSCESS    BRONCHOSCOPY N/A 6/12/2019    BRONCHOSCOPY BRUSHINGS performed by Lucila Napier MD at [facility] N/A 10/7/2019    BRONCHOSCOPY DIAGNOSTIC OR CELL [address] ONLY performed by Lucila Napier MD at [facility]  11/18/2013    LUMBAR FUSION  03/2019    [facility]       Review of Systems:   · Constitutional: As noted in the HPI. · Eyes: No visual changes or diplopia. No scleral icterus. · ENT: No headaches, hearing loss or vertigo. No nasal congestion, or sore throat. · Cardiovascular: No chest pain, dyspnea on exertion, or palpitations. · Respiratory: See above  · Gastrointestinal: No abdominal pain, nausea or emesis. No diarrhea or rectal bleeding or melena. No change in bowel habits. · Genitourinary: No dysuria, urinary frequency, or incontinence. No hematuria. · Musculoskeletal: No gait disturbance, weakness or joint complaints. · Integumentary: No rash or pruritis. No abnormal pigmentation, hair or nail changes. · Neurological: No headache, diplopia, dizziness, tremor, change in muscle strength, numbness or tingling. No change in gait, balance, coordination, mood, affect, memory, mentation, behavior. · Psychiatric: No anxiety or depression. · Endocrine: No temperature intolerance, excessive thirst, fluid intake, urinary frequency, excessive appetite, or recent weight change. · Hematologic/Lymphatic: No abnormal bruising or bleeding, blood clots or swollen lymph nodes. No anemia, fever, chills, night sweats, or swollen glands. · Allergic/Immunologic: No seasonal or perenial allergies. No history of hives or atopic dermatitis.     Social History: · Alcohol:  No  · Tobacco:   Past  · Employment:  no silica or asbestos exposure  · Family:  No family history of lung disease    Medications:     [START ON 2021] rifAMPin  600 mg Oral Once per day on     [START ON 2021] ethambutol  800 mg Oral Once per day on     [START ON 2021] clarithromycin  500 mg Oral Once per day on     dexamethasone  6 mg Oral 2 times per day    sodium chloride flush  10 mL Intravenous 2 times per day    enoxaparin  40 mg Subcutaneous Daily    remdesivir IVPB  100 mg Intravenous Q24H    tiotropium  18 mcg Inhalation Daily    And    budesonide-formoterol  2 puff Inhalation BID       Vitals:  Tmax:  VITALS:  BP 95/66   Pulse 63   Temp 99.1 °F (37.3 °C) (Oral)   Resp 18   Ht 6' 1\" (1.854 m)   Wt 200 lb (90.7 kg)   SpO2 96%   BMI 26.39 kg/m²   24HR INTAKE/OUTPUT:      Intake/Output Summary (Last 24 hours) at 2021 1804  Last data filed at 2021 0909  Gross per 24 hour   Intake    Output 450 ml   Net -450 ml     CURRENT PULSE OXIMETRY:  SpO2: 96 %  24HR PULSE OXIMETRY RANGE:  SpO2  Av.7 %  Min: 77 %  Max: 96 %    EXAM:  General: No distress. Alert. Eyes: PERRL. No sclera icterus. No conjunctival injection. ENT: No discharge. Pharynx clear. Neck: Trachea midline. Normal thyroid. No jvd, no hjr. Resp: No wheezing. No accessory muscle use. No rales. No rhonchi. CV: Regular rate. Regular rhythm. No murmur No rub. Abd: Non-tender. Non-distended. No masses. No organmegaly. Normal bowel sounds. Skin: Warm and dry. No nodule on exposed extremities. No rash on exposed extremities. Lymph: No cervical LAD. No supraclavicular LAD. Ext: No joint deformity. No clubbing. No cyanosis. No edema  Neuro: Awake. Follows commands. Positive pupils/gag/corneals. Normal pain response.      Lab Results:  CBC:   Recent Labs     21  0941 21  0500   WBC 3.8* 3.2*   HGB 16.9* 16.1   HCT 53.0 49.1   MCV 92.0 90.9  118*       BMP:  Recent Labs     01/01/21  0941 01/02/21  0500    134   K 4.1 3.8    104   CO2 24 20*   BUN 13 13   CREATININE 0.9 0.7    ALB:3,BILIDIR:3,BILITOT:3,ALKPHOS:3)@    PT/INR: No results for input(s): PROTIME, INR in the last 72 hours. Cultures:  Sputum: not available  Blood: not available    ABG:   No results for input(s): PH, PO2, PCO2, HCO3, BE, O2SAT, METHB, O2HB, COHB, O2CON, HHB, THB in the last 72 hours. Films:     US DUP LOWER EXTREMITY LEFT MARINA   Final Result   No evidence of DVT in the left lower extremity. XR CHEST PORTABLE   Final Result   1. Patchy bilateral pneumonia   2. Advanced emphysematous changes with interstitial fibrosis. .        Assessment:  1. End-stage chronic obstructive pulmonary disease with ongoing need for supplemental oxygen  2. Mycobacterium avium complex pneumonia, lingula currently being treated  3. COVID-19 pneumonia      Plan:  1. Inhalers as tolerated  2. Supplemental oxygen  3. Steroids  4. Antimicrobials/antivirals per infectious disease      Thanks for letting us see this patient in consultation. Total time in reviewing the previous admissions and records, reviewing the current x-rays, labs, and discussing with clinical staff including nursing and physicians, exceeded 50 minutes. Please contact us with any questions. Office [phone] or after hours through [facility], [phone]. Please note that voice recognition technology was used (while wearing a Covid mandated mask) in the preparation of this note and make therefore it may contain inadvertent transcription errors. If the patient is a COVID 19 isolation patient, the above physical exam reflects that of the examining physician for the day. Zenaida Mejia M.D., F.C.C.P.     [facility], [address] [place] [address], [address], [facility]

## 2021-01-02 NOTE — CONSULTS
[address] [place] [address] [facility]  Consultation Note     Admit Date: 1/1/2021  8:48 AM    Reason for Consult:   COVID-19. MAC    Attending Physician:  Elvis Sutton MD    HISTORY OF PRESENT ILLNESS:             The history is obtained from extensive review of available past medical records. The patient is a 78 y.o. male who is known to the ID service. 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 MAC is sensitive to Linezolid, Clarithromycin, Moxifloxacin and Amikacin. Ethambutol and Rifampin were not tested because of \"inability of susceptibility test to predict outcome\". He has been on IV Amikacin x3 months and is currently on Ethambutol, Rifampin and Clarithromycin. The patient lives at home with his wife. He does not go out to gatherings but has not been to the store in the last week. He came down with sudden onset of uncontrollable shaking chills, followed by fevers up to 104 degrees, slight cough, fatigue, diarrhea and dyspnea. He came to the ED on 1/1/2020. Tested positive for SARS-CoV-2 and started on Remdesivir and dexamethasone. Antimycobacterial drugs were started. ID was asked to see him in consultation.     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    Chronic back pain     Degeneration of umbar intervertebral disc    Colon cancer screening     COPD (chronic obstructive pulmonary disease) (HCC)     Cough with hemoptysis 4/23/2019    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 ([address] 08/15/2019    First seen by ID; treatment started 9/4/2019    Emphysema lung ([address]     Glucose intolerance 6/10/2019    Hilar adenopathy 6/10/2019    Hypophosphatemia 6/10/2019  Infection due to parainfluenza virus 3 6/10/2019    Left upper lobe pneumonia 10/12/2017    Pleural effusion on right 10/3/2019    Pulmonary emphysema with fibrosis of lung [facility] 11/15/2018    Pulmonary Mycobacterium avium complex (MAC) infection ([facility] 07/12/2019    BAL results finally completed this date    Rhinovirus infection 10/16/2020    Right lower lobe pneumonia 11/15/2018    Recurrent 4/23/19    S/P lumbar fusion 6/1/2019    Thoracic ascending aortic aneurysm ([facility] 11/15/2018    Proximal ascending aorta; 3.8 cm; 11/15/18     October 2020. Admitted to [facility] with chest discomfort, rhinorrhea, sore throat and a low-grade fever. Tested positive for Rhinovirus/Enterovirus. Seen by ID and continued Clarithromycin, With ampicillin Rifampin for his MAC infection. He was seen in the office in follow-up on 12/8/2020 and was doing well. Respiratory culture was ordered. October 2019. Admitted to [facility] with fevers, chills, nausea and diarrhea. He had a productive cough. Seen by Dr. Evelia Santos. Amikacin was discontinued. Underwent a bronchoscopy and was discharged. Seen in follow-up in the office and was tolerating Ethambutol, Rifampin and Clarithromycin 3 times a week.     June 2019. Readmitted to [facility] with cough, malaise and a fever of 101.1 °F.  Tested positive for parainfluenza 3 virus. Treated with Doxycycline. Also treated for herpes simplex of the nares and lips with oral Acyclovir and discharge. Seen in follow-up on 7/12/2019. Respiratory cultures have turned positive for Mycobacterium avium complex and Penicillium. We requested sensitivities for MAC. The organism was sensitive to Linezolid, Clarithromycin, Moxifloxacin and Amikacin. Ethambutol and Rifampin were not tested because of \"inability of susceptibility test to predict outcome\". We started With albuterol, Rifampin, Clarithromycin and IV Amikacin.   Seen in follow-up on 10/10/2020    May 2019. Admitted to [facility] with shortness of breath, with fever of 101 °F and chills. Treated with Vancomycin and Cefepime for a left upper lobe pneumonia. Seen by ID. 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  This was colonized with MSSA but did not require treatment other than enzymatic debridement. He was discharged on Levofloxacin. Past Surgical History:        Procedure Laterality Date    ABSCESS DRAINAGE  2006    X2 RECTAL ABSCESS    BRONCHOSCOPY N/A 6/12/2019    BRONCHOSCOPY BRUSHINGS performed by Mor Moscoso MD at [facility] N/A 10/7/2019    BRONCHOSCOPY DIAGNOSTIC OR CELL [address] ONLY performed by Mor Moscoso MD at [facility]  11/18/2013    LUMBAR FUSION  03/2019    [facility]     Current Medications:   Scheduled Meds:   dexamethasone  6 mg Oral Daily    [START ON 1/4/2021] rifAMPin  600 mg Oral Once per day on Mon Wed Fri    [START ON 1/4/2021] ethambutol  800 mg Oral Once per day on Mon Wed Fri    [START ON 1/4/2021] clarithromycin  500 mg Oral Once per day on Mon Wed Fri    sodium chloride flush  10 mL Intravenous 2 times per day    enoxaparin  40 mg Subcutaneous Daily    remdesivir IVPB  100 mg Intravenous Q24H    tiotropium  18 mcg Inhalation Daily    And    budesonide-formoterol  2 puff Inhalation BID     Continuous Infusions:  PRN Meds:albuterol, sodium chloride flush, promethazine **OR** ondansetron, polyethylene glycol, acetaminophen **OR** acetaminophen, sodium chloride, oxyCODONE-acetaminophen **AND** oxyCODONE    Allergies:  Patient has no known allergies.     Social History:   Social History     Socioeconomic History    Marital status:      Spouse name: Not on file    Number of children: Not on file    Years of education: Not on file    Highest education level: Not on file   Occupational History    Not on file   Social Needs  Financial resource strain: Not on file    Food insecurity     Worry: Not on file     Inability: Not on file   [employer] needs     Medical: Not on file     Non-medical: Not on file   Tobacco Use    Smoking status: Former Smoker     Packs/day: 2.00     Years: 46.00     Pack years: 92.00     Types: Cigarettes     Start date: 10/12/1968     Quit date: 10/12/2014     Years since quittin.2    Smokeless tobacco: Never Used   Substance and Sexual Activity    Alcohol use: No    Drug use: No    Sexual activity: Not Currently     Partners: Female   Lifestyle    Physical activity     Days per week: Not on file     Minutes per session: Not on file    Stress: Not on file   Relationships    Social connections     Talks on phone: Not on file     Gets together: Not on file     Attends [employer] service: Not on file     Active member of club or organization: Not on file     Attends meetings of clubs or organizations: Not on file     Relationship status: Not on file    Intimate partner violence     Fear of current or ex partner: Not on file     Emotionally abused: Not on file     Physically abused: Not on file     Forced sexual activity: Not on file   Other Topics Concern    Not on file   Social History Narrative    Not on file      Pets: 2 dogs and a parrot  Travel: None  Patient works as a     Family History:       Problem Relation Age of Onset    Other Mother          age 80    Other Father          age 80   . Otherwise non-pertinent to the chief complaint. REVIEW OF SYSTEMS:    Constitutional: Positive for fevers, chills, diaphoresis  Neurologic: No headache, loss of sense of taste or smell  Psychiatric: Negative  Rheumatologic: Negative   Endocrine: Negative  Hematologic: Negative  Immunologic: Negative  ENT: Negative  Respiratory: As in the HPI  Cardiovascular: Negative  GI: Diarrhea  : Negative  Musculoskeletal: No arthralgias or myalgias  Skin: No rashes.      PHYSICAL EXAM: Vitals:   BP (!) 95/57   Pulse 76   Temp 99.7 °F (37.6 °C) (Oral)   Resp 20   Ht 6' 1\" (1.854 m)   Wt 200 lb (90.7 kg)   SpO2 90%   BMI 26.39 kg/m²   Constitutional: The patient is awake, alert, and oriented. Sitting in bed. No distress. 9 L nasal cannula  Skin: Warm and dry. No rashes were noted. HEENT: Eyes show round, and reactive pupils. No jaundice. Moist mucous membranes, no ulcerations, no thrush. Neck: Supple to movements. No lymphadenopathy. Chest: No use of accessory muscles to breathe. Symmetrical expansion. Auscultation reveals fine bibasilar crackles. Cardiovascular: S1 and S2 are rhythmic and regular. No murmurs appreciated. Abdomen: Positive bowel sounds to auscultation. Benign to palpation. No masses felt. No hepatosplenomegaly. Extremities: No clubbing, no cyanosis, no edema.   Lines: peripheral      CBC+dif:  Recent Labs     01/01/21  0941 01/02/21  0500   WBC 3.8* 3.2*   HGB 16.9* 16.1   HCT 53.0 49.1   MCV 92.0 90.9    118*   NEUTROABS 3.31 1.93     Lab Results   Component Value Date    CRP 4.1 (H) 10/17/2020    CRP 1.8 (H) 10/16/2020    CRP 0.3 09/14/2020      No results found for: CRPHS  Lab Results   Component Value Date    SEDRATE 1 10/17/2020    SEDRATE 0 10/16/2020    SEDRATE 0 09/14/2020     Lab Results   Component Value Date    ALT 7 01/02/2021    AST 19 01/02/2021    ALKPHOS 56 01/02/2021    BILITOT 0.2 01/02/2021     Lab Results   Component Value Date     01/02/2021    K 3.8 01/02/2021     01/02/2021    CO2 20 01/02/2021    BUN 13 01/02/2021    CREATININE 0.7 01/02/2021    GFRAA >60 01/02/2021    LABGLOM >60 01/02/2021    GLUCOSE 89 01/02/2021    PROT 5.6 01/02/2021    LABALBU 3.5 01/02/2021    CALCIUM 8.5 01/02/2021    BILITOT 0.2 01/02/2021    ALKPHOS 56 01/02/2021    AST 19 01/02/2021    ALT 7 01/02/2021       Lab Results   Component Value Date    PROTIME 12.5 05/30/2019    INR 1.1 05/30/2019       Lab Results   Component Value Date TSH 1.290 10/16/2020       Lab Results   Component Value Date    COLORU Yellow 01/01/2021    PHUR 5.5 01/01/2021    LABCAST FEW 10/02/2019    WBCUA NONE 01/01/2021    RBCUA NONE 01/01/2021    MUCUS Present 04/22/2019    BACTERIA NONE SEEN 01/01/2021    CLARITYU Clear 01/01/2021    SPECGRAV >=1.030 01/01/2021    LEUKOCYTESUR Negative 01/01/2021    UROBILINOGEN 0.2 01/01/2021    BILIRUBINUR Negative 01/01/2021    BLOODU Negative 01/01/2021    GLUCOSEU Negative 01/01/2021       Lab Results   Component Value Date    HCO3 21.3 05/28/2019    BE -1.8 05/28/2019    O2SAT 91.6 05/28/2019    PH 7.440 05/28/2019    PCO2 32.1 05/28/2019    PO2 59.1 05/28/2019     Radiology:      Microbiology:  Pending  No results for input(s): BC in the last 72 hours. No results for input(s): ORG in the last 72 hours. No results for input(s): Denise Heymann in the last 72 hours. No results for input(s): STREPNEUMAGU in the last 72 hours. No results for input(s): LP1UAG in the last 72 hours. No results for input(s): ASO in the last 72 hours. No results for input(s): CULTRESP in the last 72 hours. Recent Labs     01/02/21  0500   PROCAL 0.09*       Assessment:  · SARS-CoV-2 infection with pneumonia. Oxygen requirements have increased. He is on 9 L from a baseline of 3 L at home  · COPD  · MAC of the lung, currently on antimycobacterial agents since August 2019  · Leukopenia with lymphopenia associated to the above    Plan:    · Continue Remdesivir  · Continue Rifampin, Ethambutol and Clarithromycin Mondays, Wednesdays, Friday  · Check cultures, baseline ESR, CRP  · AFB culture  · Will follow with you    Thank you for having us see this patient in consultation. I will be discussing this case with the treating physicians.     Vera Montano  10:16 AM  1/2/2021

## 2021-01-02 NOTE — PROGRESS NOTES
Patient up to bathroom and upon returning to bed, oxygen sat at 79% on 8L. Increased oxygen to 11L and o2 sat increased to 91%. Will continue to monitor. Respiratory notified for breathing treatment and will be in to see patient.

## 2021-01-03 PROBLEM — B97.21 SARS-ASSOCIATED CORONAVIRUS INFECTION: Status: ACTIVE | Noted: 2021-01-03

## 2021-01-03 LAB
ALBUMIN SERPL-MCNC: 4 G/DL (ref 3.5–5.2)
ALP BLD-CCNC: 69 U/L (ref 40–129)
ALT SERPL-CCNC: 10 U/L (ref 0–40)
ANION GAP SERPL CALCULATED.3IONS-SCNC: 9 MMOL/L (ref 7–16)
AST SERPL-CCNC: 22 U/L (ref 0–39)
BILIRUB SERPL-MCNC: 0.2 MG/DL (ref 0–1.2)
BUN BLDV-MCNC: 10 MG/DL (ref 8–23)
CALCIUM SERPL-MCNC: 9.2 MG/DL (ref 8.6–10.2)
CHLORIDE BLD-SCNC: 101 MMOL/L (ref 98–107)
CO2: 24 MMOL/L (ref 22–29)
CREAT SERPL-MCNC: 0.7 MG/DL (ref 0.7–1.2)
D DIMER: <200 NG/ML DDU
GFR AFRICAN AMERICAN: >60
GFR NON-AFRICAN AMERICAN: >60 ML/MIN/1.73
GLUCOSE BLD-MCNC: 124 MG/DL (ref 74–99)
HCT VFR BLD CALC: 52.7 % (ref 37–54)
HEMOGLOBIN: 17.5 G/DL (ref 12.5–16.5)
MCH RBC QN AUTO: 29.9 PG (ref 26–35)
MCHC RBC AUTO-ENTMCNC: 33.2 % (ref 32–34.5)
MCV RBC AUTO: 89.9 FL (ref 80–99.9)
PDW BLD-RTO: 15.4 FL (ref 11.5–15)
PLATELET # BLD: 156 E9/L (ref 130–450)
PMV BLD AUTO: 11.3 FL (ref 7–12)
POTASSIUM SERPL-SCNC: 4.2 MMOL/L (ref 3.5–5)
RBC # BLD: 5.86 E12/L (ref 3.8–5.8)
REASON FOR REJECTION: NORMAL
REJECTED TEST: NORMAL
SODIUM BLD-SCNC: 134 MMOL/L (ref 132–146)
TOTAL PROTEIN: 6.9 G/DL (ref 6.4–8.3)
WBC # BLD: 3.3 E9/L (ref 4.5–11.5)

## 2021-01-03 PROCEDURE — 80053 COMPREHEN METABOLIC PANEL: CPT

## 2021-01-03 PROCEDURE — 2580000003 HC RX 258: Performed by: INTERNAL MEDICINE

## 2021-01-03 PROCEDURE — 85378 FIBRIN DEGRADE SEMIQUANT: CPT

## 2021-01-03 PROCEDURE — 2500000003 HC RX 250 WO HCPCS: Performed by: INTERNAL MEDICINE

## 2021-01-03 PROCEDURE — 85027 COMPLETE CBC AUTOMATED: CPT

## 2021-01-03 PROCEDURE — 2060000000 HC ICU INTERMEDIATE R&B

## 2021-01-03 PROCEDURE — 6370000000 HC RX 637 (ALT 250 FOR IP): Performed by: INTERNAL MEDICINE

## 2021-01-03 PROCEDURE — 2700000000 HC OXYGEN THERAPY PER DAY

## 2021-01-03 PROCEDURE — 6360000002 HC RX W HCPCS: Performed by: INTERNAL MEDICINE

## 2021-01-03 PROCEDURE — 36415 COLL VENOUS BLD VENIPUNCTURE: CPT

## 2021-01-03 PROCEDURE — 6360000002 HC RX W HCPCS: Performed by: SPECIALIST

## 2021-01-03 RX ORDER — VITAMIN B COMPLEX
2000 TABLET ORAL DAILY
Status: DISCONTINUED | OUTPATIENT
Start: 2021-01-03 | End: 2021-01-08 | Stop reason: HOSPADM

## 2021-01-03 RX ORDER — ZINC SULFATE 50(220)MG
50 CAPSULE ORAL DAILY
Status: DISCONTINUED | OUTPATIENT
Start: 2021-01-03 | End: 2021-01-08 | Stop reason: HOSPADM

## 2021-01-03 RX ORDER — ASCORBIC ACID 500 MG
500 TABLET ORAL DAILY
Status: DISCONTINUED | OUTPATIENT
Start: 2021-01-03 | End: 2021-01-08 | Stop reason: HOSPADM

## 2021-01-03 RX ORDER — FAMOTIDINE 20 MG/1
20 TABLET, FILM COATED ORAL DAILY
Status: DISCONTINUED | OUTPATIENT
Start: 2021-01-03 | End: 2021-01-08 | Stop reason: HOSPADM

## 2021-01-03 RX ADMIN — ZINC SULFATE 220 MG (50 MG) CAPSULE 50 MG: CAPSULE at 15:01

## 2021-01-03 RX ADMIN — Medication 2000 UNITS: at 15:01

## 2021-01-03 RX ADMIN — Medication 10 ML: at 20:06

## 2021-01-03 RX ADMIN — DEXAMETHASONE 6 MG: 4 TABLET ORAL at 09:44

## 2021-01-03 RX ADMIN — ENOXAPARIN SODIUM 40 MG: 40 INJECTION SUBCUTANEOUS at 20:03

## 2021-01-03 RX ADMIN — Medication 10 ML: at 09:44

## 2021-01-03 RX ADMIN — FAMOTIDINE 20 MG: 20 TABLET, FILM COATED ORAL at 15:01

## 2021-01-03 RX ADMIN — REMDESIVIR 100 MG: 100 INJECTION, POWDER, LYOPHILIZED, FOR SOLUTION INTRAVENOUS at 13:30

## 2021-01-03 RX ADMIN — TIOTROPIUM BROMIDE 18 MCG: 18 CAPSULE ORAL; RESPIRATORY (INHALATION) at 09:44

## 2021-01-03 RX ADMIN — BUDESONIDE AND FORMOTEROL FUMARATE DIHYDRATE 2 PUFF: 80; 4.5 AEROSOL RESPIRATORY (INHALATION) at 09:44

## 2021-01-03 RX ADMIN — BUDESONIDE AND FORMOTEROL FUMARATE DIHYDRATE 2 PUFF: 80; 4.5 AEROSOL RESPIRATORY (INHALATION) at 20:04

## 2021-01-03 RX ADMIN — OXYCODONE HYDROCHLORIDE AND ACETAMINOPHEN 500 MG: 500 TABLET ORAL at 15:01

## 2021-01-03 RX ADMIN — DEXAMETHASONE 6 MG: 4 TABLET ORAL at 20:03

## 2021-01-03 ASSESSMENT — PAIN SCALES - GENERAL: PAINLEVEL_OUTOF10: 0

## 2021-01-03 NOTE — PROGRESS NOTES
Pulmonary Progress Note    Admit Date: 2021  Hospital day                               PCP: Lavinia Lau MD    Chief Complaint (s):  Patient Active Problem List   Diagnosis    COPD (chronic obstructive pulmonary disease) ([address]    Closed displaced fracture of second metatarsal bone of right foot with routine healing    COPD exacerbation ([address]    Bullous emphysema ([address]    Pulmonary emphysema with fibrosis of lung ([address]    Thoracic ascending aortic aneurysm ([address]    Pneumonia    Cough with hemoptysis    Pneumonia, bacterial    S/P lumbar fusion    Sepsis ([address]    Infection due to parainfluenza virus 3    Hypophosphatemia    Glucose intolerance    Hilar adenopathy    SADE (mycobacterium avium-intracellulare) ([address]    Acute respiratory failure with hypoxia ([address]    Pleural effusion on right    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 ([address]    Chest pain    Rhinovirus infection    Acute hypoxemic respiratory failure ([address]    Hypoxia    SARS-associated coronavirus infection       Subjective:  · Awake and alert, still with some labored breathing.       Vitals:  VITALS:  /64   Pulse 63   Temp 97 °F (36.1 °C) (Axillary)   Resp 18   Ht 6' 1\" (1.854 m)   Wt 200 lb (90.7 kg)   SpO2 92%   BMI 26.39 kg/m²     24HR INTAKE/OUTPUT:    No intake or output data in the 24 hours ending 21 1718    24HR PULSE OXIMETRY RANGE:    SpO2  Av.3 %  Min: 92 %  Max: 95 %    Medications:  IV:      Scheduled Meds:   Vitamin D  2,000 Units Oral Daily    ascorbic acid  500 mg Oral Daily    zinc sulfate  50 mg Oral Daily    famotidine  20 mg Oral Daily    [START ON 2021] rifAMPin  600 mg Oral Once per day on     [START ON 2021] ethambutol  800 mg Oral Once per day on     [START ON 2021] clarithromycin  500 mg Oral Once per day on     dexamethasone  6 mg Oral 2 times per day  sodium chloride flush  10 mL Intravenous 2 times per day    enoxaparin  40 mg Subcutaneous Daily    remdesivir IVPB  100 mg Intravenous Q24H    tiotropium  18 mcg Inhalation Daily    And    budesonide-formoterol  2 puff Inhalation BID       Diet:   DIET GENERAL;     EXAM:  General: No distress. Alert. Eyes: PERRL. No sclera icterus. No conjunctival injection. ENT: No discharge. Pharynx clear. Neck: Trachea midline. Normal thyroid. Resp: No accessory muscle use. No rales. No wheezing. No rhonchi. CV: Regular rate. Regular rhythm. No murmur or rub. Abd: Non-tender. Non-distended. No masses. No organomegaly. Normal bowel sounds. Skin: Warm and dry. No nodule on exposed extremities. No rash on exposed extremities. Ext: No cyanosis, clubbing, edema  Lymph: No cervical LAD. No supraclavicular LAD. M/S: No cyanosis. No joint deformity. No clubbing. Neuro: Awake. Follows commands. Positive pupils/gag/corneals. Normal pain response. Results:  CBC:   Recent Labs     01/01/21  0941 01/02/21  0500 01/03/21  1515   WBC 3.8* 3.2* 3.3*   HGB 16.9* 16.1 17.5*   HCT 53.0 49.1 52.7   MCV 92.0 90.9 89.9    118* 156     BMP:   Recent Labs     01/01/21  0941 01/02/21  0500    134   K 4.1 3.8    104   CO2 24 20*   BUN 13 13   CREATININE 0.9 0.7     LIVER PROFILE:   Recent Labs     01/01/21  0941 01/02/21  0500   AST 17 19   ALT 8 7   BILITOT 0.2 0.2   ALKPHOS 67 56     PT/INR: No results for input(s): PROTIME, INR in the last 72 hours. APTT: No results for input(s): APTT in the last 72 hours. Pathology:  1. N/A      Microbiology:  1. None    Recent ABG:   No results for input(s): PH, PO2, PCO2, HCO3, BE, O2SAT, METHB, O2HB, COHB, O2CON, HHB, THB in the last 72 hours. Recent Films:  US DUP LOWER EXTREMITY LEFT MARINA   Final Result   No evidence of DVT in the left lower extremity. XR CHEST PORTABLE   Final Result   1.  Patchy bilateral pneumonia 2. Advanced emphysematous changes with interstitial fibrosis. Assessment:  1. End-stage chronic obstructive pulmonary disease with ongoing need for supplemental oxygen  2. Mycobacterium avium complex pneumonia, lingula currently being treated  3. COVID-19 pneumonia        Plan:  1. Inhalers as tolerated  2. Supplemental oxygen, the patient is on 3.5 L at home he requires more here. 3. Steroids  4. Antimicrobials/antivirals per infectious disease       Time at the bedside, reviewing labs and radiographs, reviewing updated notes and consultations, discussing with staff and family was more than 35 minutes. Please note that voice recognition technology was used in the preparation of this note and make therefore it may contain inadvertent transcription errors. If the patient is a COVID 19 isolation patient, the above physical exam reflects that of the examining physician for the day. Shante Barnes M.D., F.C.C.P.     [facility], [address] [place] [address], [address] [place] [address]

## 2021-01-03 NOTE — PROGRESS NOTES
[address] [place] [address] [facility]  Progress Note  CC: shortness of breath  Face to face encounter  SUBJECTIVE:  Patient is tolerating medications. No reported adverse drug reactions. ROS: No nausea, vomiting, diarrhea. + cough, + shortness of breath. No rash. On 8 liters nasal canula. Afebrile today   Sitting up in bed- eating lunch     Medications:  Scheduled Meds:   [START ON 1/4/2021] rifAMPin  600 mg Oral Once per day on Mon Wed Fri    [START ON 1/4/2021] ethambutol  800 mg Oral Once per day on Mon Wed Fri    [START ON 1/4/2021] clarithromycin  500 mg Oral Once per day on Mon Wed Fri    dexamethasone  6 mg Oral 2 times per day    sodium chloride flush  10 mL Intravenous 2 times per day    enoxaparin  40 mg Subcutaneous Daily    remdesivir IVPB  100 mg Intravenous Q24H    tiotropium  18 mcg Inhalation Daily    And    budesonide-formoterol  2 puff Inhalation BID     Continuous Infusions:  PRN Meds:albuterol, sodium chloride flush, promethazine **OR** ondansetron, polyethylene glycol, acetaminophen **OR** acetaminophen, sodium chloride, oxyCODONE-acetaminophen **AND** oxyCODONE  OBJECTIVE:  Patient Vitals for the past 24 hrs:   BP Temp Temp src Pulse Resp SpO2   01/03/21 0930 91/63 97.8 °F (36.6 °C) Oral 66 16 94 %   01/02/21 2030 91/60 98.2 °F (36.8 °C) Oral 63 18 92 %   01/02/21 1605 95/66 99.1 °F (37.3 °C) Oral 63 18 96 %     Constitutional: The patient is awake, alert, and oriented. No acute distress   Skin: Warm and dry. No rashes were noted. Head: Eyes show round, and reactive pupils. No jaundice. Mouth: Moist mucous membranes, no ulcerations, no thrush. Neck: Supple to movements. No lymphadenopathy. Chest: No use of accessory muscles to breathe. Symmetrical expansion. Auscultation reveals crackles to bases. diminished on 8 liters nasal canula. Cardiovascular: S1 and S2 are rhythmic and regular. No murmurs appreciated. · SARS-CoV-2 infection with pneumonia. Oxygen requirements have increased. He is on 9 L from a baseline of 3 L at home  · COPD  · MAC of the lung, currently on antimycobacterial agents since August 2019  · Leukopenia with lymphopenia associated to the above    PLAN:  · Continue remdesivir - Day #3 of 5   · Continue rifampin, ethambutol, and clarithromycin-   · Check cultures- follow AFB cultures  · Currently on 8 liters - monitor respiratory status. · Monitor labs    [facility]  12:23 PM  1/3/2021     Patient seen and examined. I had a face to face encounter with the patient. Agree with exam.  Assessment and plan as outlined above and directed by me. Addition and corrections were done as deemed appropriate. My exam and plan include: The patient is tolerating Remdesivir well. Unfortunately today's dose has infiltrated in his arm and he has a big lump on the ventral aspect of the right forearm. He will be restarting antimycobacterial agents tomorrow.     Aby Bowen  1/3/2021  2:49 PM

## 2021-01-03 NOTE — PROGRESS NOTES
Internal Medicine Progress Note      Synopsis: Patient admitted on 1/1/2021     Subjective    Clinically improving. Feeling better. Stable overnight. No other overnight issues reported. Still having loose stools but otherwise feeling better. His O2 needs have declined and he is feeling better. On day 3 of remdesivir today    Exam:  BP 91/63   Pulse 66   Temp 97.8 °F (36.6 °C) (Oral)   Resp 16   Ht 6' 1\" (1.854 m)   Wt 200 lb (90.7 kg)   SpO2 95%   BMI 26.39 kg/m²   General appearance: No apparent distress, appears stated age and cooperative. HEENT: Pupils equal, round, and reactive to light. Conjunctivae/corneas clear. Neck: Supple. No jugular venous distention. Trachea midline. Respiratory: Decreased   cardiovascular: Regular rate and rhythm with normal S1/S2 without murmurs, rubs or gallops. Abdomen: Soft, non-tender, non-distended with normal bowel sounds. Musculoskeletal: No clubbing, cyanosis or edema bilaterally. Brisk capillary refill. 2+ lower extremity pulses (dorsalis pedis).    Skin:  No rashes    Neurologic: awake, alert and following commands     Medications:  Reviewed    Infusion Medications   Scheduled Medications    [START ON 1/4/2021] rifAMPin  600 mg Oral Once per day on Mon Wed Fri    [START ON 1/4/2021] ethambutol  800 mg Oral Once per day on Mon Wed Fri    [START ON 1/4/2021] clarithromycin  500 mg Oral Once per day on Mon Wed Fri    dexamethasone  6 mg Oral 2 times per day    sodium chloride flush  10 mL Intravenous 2 times per day    enoxaparin  40 mg Subcutaneous Daily    remdesivir IVPB  100 mg Intravenous Q24H    tiotropium  18 mcg Inhalation Daily    And    budesonide-formoterol  2 puff Inhalation BID     PRN Meds: albuterol, sodium chloride flush, promethazine **OR** ondansetron, polyethylene glycol, acetaminophen **OR** acetaminophen, sodium chloride, oxyCODONE-acetaminophen **AND** oxyCODONE    I/O No intake or output data in the 24 hours ending 01/03/21 1406    Labs:   Recent Labs     01/01/21  0941 01/02/21  0500   WBC 3.8* 3.2*   HGB 16.9* 16.1   HCT 53.0 49.1    118*       Recent Labs     01/01/21  0941 01/02/21  0500    134   K 4.1 3.8    104   CO2 24 20*   BUN 13 13   CREATININE 0.9 0.7   CALCIUM 9.2 8.5*       Recent Labs     01/01/21  0941 01/02/21  0500   PROT 6.7 5.6*   ALKPHOS 67 56   ALT 8 7   AST 17 19   BILITOT 0.2 0.2       No results for input(s): INR in the last 72 hours. Recent Labs     01/01/21  0941 01/02/21  1755   TROPONINI <0.01 <0.01       Chronic labs:  Lab Results   Component Value Date    CHOL 145 10/16/2020    TRIG 48 10/16/2020    HDL 56 10/16/2020    LDLCALC 79 10/16/2020    TSH 1.290 10/16/2020    INR 1.1 05/30/2019    LABA1C 5.5 10/16/2020       Radiology:  No results found. ASSESSMENT:    Active Problems:    Acute hypoxemic respiratory failure (HCC)    Hypoxia  Resolved Problems:    * No resolved hospital problems. *  SARS-CoV-2 infection  Known SADE     PLAN:    Maintain remdesivir  Premorbid medications for SADE continue  No medications for blood pressure and his blood pressure is slightly low but he is tolerating well  Hypoxemic respiratory insufficiency managed with O2 and on dexamethasone. Add PPI and vitamin C and vitamin D and zinc  Appreciate input of consultants    Diet: DIET GENERAL;  Code Status: Full Code  PT/OT Eval Status:   Can order  DVT Prophylaxis:   In place D-dimer for today is pending  Recommended disposition at discharge:   Home likely    +++++++++++++++++++++++++++++++++++++++++++++++++  Elvis Sutton MD   [facility].  +++++++++++++++++++++++++++++++++++++++++++++++++  NOTE: This report was transcribed using voice recognition software. Every effort was made to ensure accuracy; however, inadvertent computerized transcription errors may be present.

## 2021-01-04 LAB
ALBUMIN SERPL-MCNC: 3.6 G/DL (ref 3.5–5.2)
ALP BLD-CCNC: 63 U/L (ref 40–129)
ALT SERPL-CCNC: 9 U/L (ref 0–40)
ANION GAP SERPL CALCULATED.3IONS-SCNC: 7 MMOL/L (ref 7–16)
AST SERPL-CCNC: 20 U/L (ref 0–39)
BILIRUB SERPL-MCNC: <0.2 MG/DL (ref 0–1.2)
BUN BLDV-MCNC: 10 MG/DL (ref 8–23)
CALCIUM SERPL-MCNC: 9.3 MG/DL (ref 8.6–10.2)
CHLORIDE BLD-SCNC: 103 MMOL/L (ref 98–107)
CO2: 22 MMOL/L (ref 22–29)
CREAT SERPL-MCNC: 0.6 MG/DL (ref 0.7–1.2)
GFR AFRICAN AMERICAN: >60
GFR NON-AFRICAN AMERICAN: >60 ML/MIN/1.73
GLUCOSE BLD-MCNC: 116 MG/DL (ref 74–99)
HCT VFR BLD CALC: 54 % (ref 37–54)
HEMOGLOBIN: 17.4 G/DL (ref 12.5–16.5)
MCH RBC QN AUTO: 29.1 PG (ref 26–35)
MCHC RBC AUTO-ENTMCNC: 32.2 % (ref 32–34.5)
MCV RBC AUTO: 90.5 FL (ref 80–99.9)
PDW BLD-RTO: 15 FL (ref 11.5–15)
PLATELET # BLD: 147 E9/L (ref 130–450)
PMV BLD AUTO: 9.9 FL (ref 7–12)
POTASSIUM SERPL-SCNC: 4.3 MMOL/L (ref 3.5–5)
RBC # BLD: 5.97 E12/L (ref 3.8–5.8)
SODIUM BLD-SCNC: 132 MMOL/L (ref 132–146)
TOTAL PROTEIN: 6.1 G/DL (ref 6.4–8.3)
WBC # BLD: 5.3 E9/L (ref 4.5–11.5)

## 2021-01-04 PROCEDURE — 6360000002 HC RX W HCPCS: Performed by: INTERNAL MEDICINE

## 2021-01-04 PROCEDURE — 6370000000 HC RX 637 (ALT 250 FOR IP): Performed by: INTERNAL MEDICINE

## 2021-01-04 PROCEDURE — 36415 COLL VENOUS BLD VENIPUNCTURE: CPT

## 2021-01-04 PROCEDURE — 2700000000 HC OXYGEN THERAPY PER DAY

## 2021-01-04 PROCEDURE — 2580000003 HC RX 258: Performed by: INTERNAL MEDICINE

## 2021-01-04 PROCEDURE — 80053 COMPREHEN METABOLIC PANEL: CPT

## 2021-01-04 PROCEDURE — 2060000000 HC ICU INTERMEDIATE R&B

## 2021-01-04 PROCEDURE — 6360000002 HC RX W HCPCS: Performed by: SPECIALIST

## 2021-01-04 PROCEDURE — 97161 PT EVAL LOW COMPLEX 20 MIN: CPT

## 2021-01-04 PROCEDURE — 97165 OT EVAL LOW COMPLEX 30 MIN: CPT

## 2021-01-04 PROCEDURE — 2500000003 HC RX 250 WO HCPCS: Performed by: INTERNAL MEDICINE

## 2021-01-04 PROCEDURE — 85027 COMPLETE CBC AUTOMATED: CPT

## 2021-01-04 RX ORDER — CLARITHROMYCIN 500 MG/1
1000 TABLET, COATED ORAL
Status: DISCONTINUED | OUTPATIENT
Start: 2021-01-04 | End: 2021-01-04

## 2021-01-04 RX ORDER — ETHAMBUTOL HYDROCHLORIDE 400 MG/1
400 TABLET, FILM COATED ORAL
Status: DISCONTINUED | OUTPATIENT
Start: 2021-01-04 | End: 2021-01-08 | Stop reason: HOSPADM

## 2021-01-04 RX ORDER — LANOLIN ALCOHOL/MO/W.PET/CERES
10 CREAM (GRAM) TOPICAL NIGHTLY
Status: DISCONTINUED | OUTPATIENT
Start: 2021-01-04 | End: 2021-01-08 | Stop reason: HOSPADM

## 2021-01-04 RX ORDER — CLARITHROMYCIN 500 MG/1
500 TABLET, COATED ORAL
Status: DISCONTINUED | OUTPATIENT
Start: 2021-01-04 | End: 2021-01-08 | Stop reason: HOSPADM

## 2021-01-04 RX ADMIN — Medication 10 ML: at 09:02

## 2021-01-04 RX ADMIN — ONDANSETRON 4 MG: 2 INJECTION INTRAMUSCULAR; INTRAVENOUS at 13:49

## 2021-01-04 RX ADMIN — DEXAMETHASONE 6 MG: 4 TABLET ORAL at 09:02

## 2021-01-04 RX ADMIN — Medication 10 ML: at 22:01

## 2021-01-04 RX ADMIN — RIFAMPIN 300 MG: 300 CAPSULE ORAL at 09:02

## 2021-01-04 RX ADMIN — RIFAMPIN 300 MG: 300 CAPSULE ORAL at 17:11

## 2021-01-04 RX ADMIN — ENOXAPARIN SODIUM 40 MG: 40 INJECTION SUBCUTANEOUS at 21:59

## 2021-01-04 RX ADMIN — OXYCODONE HYDROCHLORIDE AND ACETAMINOPHEN 500 MG: 500 TABLET ORAL at 09:03

## 2021-01-04 RX ADMIN — ETHAMBUTOL HYDROCHLORIDE 400 MG: 400 TABLET ORAL at 17:11

## 2021-01-04 RX ADMIN — Medication 10.5 MG: at 22:00

## 2021-01-04 RX ADMIN — CLARITHROMYCIN 500 MG: 500 TABLET ORAL at 17:11

## 2021-01-04 RX ADMIN — ETHAMBUTOL HYDROCHLORIDE 400 MG: 400 TABLET ORAL at 09:02

## 2021-01-04 RX ADMIN — CLARITHROMYCIN 500 MG: 500 TABLET ORAL at 09:02

## 2021-01-04 RX ADMIN — BUDESONIDE AND FORMOTEROL FUMARATE DIHYDRATE 2 PUFF: 80; 4.5 AEROSOL RESPIRATORY (INHALATION) at 09:02

## 2021-01-04 RX ADMIN — REMDESIVIR 100 MG: 100 INJECTION, POWDER, LYOPHILIZED, FOR SOLUTION INTRAVENOUS at 13:49

## 2021-01-04 RX ADMIN — FAMOTIDINE 20 MG: 20 TABLET, FILM COATED ORAL at 09:02

## 2021-01-04 RX ADMIN — BUDESONIDE AND FORMOTEROL FUMARATE DIHYDRATE 2 PUFF: 80; 4.5 AEROSOL RESPIRATORY (INHALATION) at 22:01

## 2021-01-04 RX ADMIN — ZINC SULFATE 220 MG (50 MG) CAPSULE 50 MG: CAPSULE at 09:02

## 2021-01-04 RX ADMIN — Medication 2000 UNITS: at 09:02

## 2021-01-04 RX ADMIN — DEXAMETHASONE 6 MG: 4 TABLET ORAL at 22:00

## 2021-01-04 RX ADMIN — TIOTROPIUM BROMIDE 18 MCG: 18 CAPSULE ORAL; RESPIRATORY (INHALATION) at 09:03

## 2021-01-04 ASSESSMENT — PAIN SCALES - GENERAL
PAINLEVEL_OUTOF10: 0
PAINLEVEL_OUTOF10: 0

## 2021-01-04 NOTE — PROGRESS NOTES
[facility]  [address], [address], 17 [facility]      Pulmonary Progress Note      SUBJECTIVE:  Sitting up in bed, feeling better since admission, still with cough but less sputum production.  On 6 li NC, suppose to be on oxygen in daytime and at night but using oxygen in daytime sometimes only and on 3 li at night    OBJECTIVE    Medications    Continuous Infusions:    Scheduled Meds:   clarithromycin  500 mg Oral 2 times per day on     ethambutol  400 mg Oral 2 times per day on     rifAMPin  300 mg Oral 2 times per day on     Vitamin D  2,000 Units Oral Daily    ascorbic acid  500 mg Oral Daily    zinc sulfate  50 mg Oral Daily    famotidine  20 mg Oral Daily    dexamethasone  6 mg Oral 2 times per day    sodium chloride flush  10 mL Intravenous 2 times per day    enoxaparin  40 mg Subcutaneous Daily    remdesivir IVPB  100 mg Intravenous Q24H    tiotropium  18 mcg Inhalation Daily    And    budesonide-formoterol  2 puff Inhalation BID       PRN Meds:albuterol, sodium chloride flush, promethazine **OR** ondansetron, polyethylene glycol, acetaminophen **OR** acetaminophen, sodium chloride, oxyCODONE-acetaminophen **AND** oxyCODONE    Physical    VITALS:  BP 93/62   Pulse 71   Temp 97.6 °F (36.4 °C) (Oral)   Resp 20   Ht 6' 1\" (1.854 m)   Wt 200 lb (90.7 kg)   SpO2 95%   BMI 26.39 kg/m²     24HR INTAKE/OUTPUT:    No intake or output data in the 24 hours ending 21 0942    24HR PULSE OXIMETRY RANGE:    SpO2  Av.2 %  Min: 88 %  Max: 95 %    General appearance: alert, appears stated age and cooperative  Lungs: rhonchi bibasilar  Heart: regular rate and rhythm, S1, S2 normal, no murmur, click, rub or gallop  Abdomen: soft, non-tender; bowel sounds normal; no masses,  no organomegaly  Extremities: extremities normal, atraumatic, no cyanosis or edema Neurologic: Mental status: Alert, oriented, thought content appropriate    Data    CBC:   Recent Labs     01/02/21  0500 01/03/21  1515 01/04/21  0435   WBC 3.2* 3.3* 5.3   HGB 16.1 17.5* 17.4*   HCT 49.1 52.7 54.0   MCV 90.9 89.9 90.5   * 156 147       BMP:  Recent Labs     01/02/21  0500 01/03/21  1720 01/04/21  0435    134 132   K 3.8 4.2 4.3    101 103   CO2 20* 24 22   BUN 13 10 10   CREATININE 0.7 0.7 0.6*    ALB:3,BILIDIR:3,BILITOT:3,ALKPHOS:3)@    PT/INR: No results for input(s): PROTIME, INR in the last 72 hours. ABG:   No results for input(s): PH, PO2, PCO2, HCO3, BE, O2SAT, METHB, O2HB, COHB, O2CON, HHB, THB in the last 72 hours. Radiology/Other tests reviewed: none    Assessment:     Principal Problem:    SARS-associated coronavirus infection  Active Problems:    COPD exacerbation ([facility]    Pulmonary emphysema with fibrosis of lung ([facility]    SADE (mycobacterium avium-intracellulare) ([facility]    Acute hypoxemic respiratory failure (HCC)    Hypoxia  Resolved Problems:    * No resolved hospital problems. *      Plan:       1. Cont with oxygen, taper as tolerated, prone positioning when able to  2. Cont with steroids and remdesivir  3. Cont with antibiotics for SADE  4. Cont with MDI, observe respiratory function and cough/sputum production  5. Incentive spirometer and observe respiratory function and cough  6. OOB to chair, PT/OT if needed      Time at the bedside, reviewing labs and radiographs, reviewing notes and consultations, discussing with staff and family was more than 35 minutes. Thanks for letting us see this patient in consultation. Please contact us with any questions. Office [phone] or after hours through [employer], [phone].

## 2021-01-04 NOTE — PROGRESS NOTES
Subjective:  Feeling better   No CP or SOB  No fever or chills   No uncontrolled pain  No vomiting     +diarrhea     Objective:    BP (!) 94/58   Pulse 68   Temp 97.8 °F (36.6 °C) (Oral)   Resp 20   Ht 6' 1\" (1.854 m)   Wt 200 lb (90.7 kg)   SpO2 92%   BMI 26.39 kg/m²     24HR INTAKE/OUTPUT:  No intake or output data in the 24 hours ending 01/04/21 1504    General appearance: NAD, conversant  Neck: FROM, supple   Lungs: Clear bilaterally no wheezes, no rhonchi, no crackles  CV: RRR, no MRGs; normal carotid upstroke and amplitude without Bruits  Abdomen: Soft, non-tender; no masses or HSM  Extremities: No edema, no cyanosis, no clubbing  Skin: Intact no rash, no lesions, no ulcers    Psych: Alert and oriented normal affect  Neuro: Nonfocal  Most Recent Labs  Lab Results   Component Value Date    WBC 5.3 01/04/2021    HGB 17.4 (H) 01/04/2021    HCT 54.0 01/04/2021     01/04/2021     01/04/2021    K 4.3 01/04/2021     01/04/2021    CREATININE 0.6 (L) 01/04/2021    BUN 10 01/04/2021    CO2 22 01/04/2021    GLUCOSE 116 (H) 01/04/2021    ALT 9 01/04/2021    AST 20 01/04/2021    INR 1.1 05/30/2019    TSH 1.290 10/16/2020    LABA1C 5.5 10/16/2020     No results for input(s): MG in the last 72 hours. Lab Results   Component Value Date    CALCIUM 9.3 01/04/2021    PHOS 2.4 (L) 10/17/2020        US DUP LOWER EXTREMITY LEFT MARINA   Final Result   No evidence of DVT in the left lower extremity. XR CHEST PORTABLE   Final Result   1. Patchy bilateral pneumonia   2. Advanced emphysematous changes with interstitial fibrosis. Assessment    Principal Problem:    SARS-associated coronavirus infection  Active Problems:    COPD exacerbation ([facility]    Pulmonary emphysema with fibrosis of lung ([facility]    SADE (mycobacterium avium-intracellulare) [facility]    Acute hypoxemic respiratory failure (HCC)    Hypoxia  Resolved Problems:    * No resolved hospital problems.  *      Plan: 45-year-old male history of Mycobacterium Avium admitted with COVID-19 and respiratory failure    SARS-CoV-2 PCR +   Isolation--droplet plus  Supplemental O2 wean as tolerated 92% on 6 L  MDIs, no nebulizers  Avoid NIPPV  Remdesivir  Decadron  Melatonin, Pepcid, Zinc, vitamin C, vitamin D  Monitor inflammatory markers  Pulmonology consult appreciated  ID consult appreciated  Check C. difficile  Medications for other co morbidities cont as appropriate w dosage adjustments as necessary  PT/OT  DVT PPx  DC planning      Electronically signed by Cynthia Reyes MD on 1/4/2021 at 3:04 PM

## 2021-01-04 NOTE — PROGRESS NOTES
[address] [place] [address] [facility]  [facility]  Progress Note  CC: shortness of breath  Face to face encounter  SUBJECTIVE:  No new complaints today. Tolerating antibiotics. Minimal dyspnea. He has diarrhea up to 4 times in a day. Somewhat loose. Review of systems: As above, otherwise unremarkable. Medications:  Scheduled Meds:   clarithromycin  500 mg Oral 2 times per day on Mon Wed Fri    ethambutol  400 mg Oral 2 times per day on Mon Wed Fri    rifAMPin  300 mg Oral 2 times per day on Mon Wed Fri    Vitamin D  2,000 Units Oral Daily    ascorbic acid  500 mg Oral Daily    zinc sulfate  50 mg Oral Daily    famotidine  20 mg Oral Daily    dexamethasone  6 mg Oral 2 times per day    sodium chloride flush  10 mL Intravenous 2 times per day    enoxaparin  40 mg Subcutaneous Daily    remdesivir IVPB  100 mg Intravenous Q24H    tiotropium  18 mcg Inhalation Daily    And    budesonide-formoterol  2 puff Inhalation BID     Continuous Infusions:  PRN Meds:albuterol, sodium chloride flush, promethazine **OR** ondansetron, polyethylene glycol, acetaminophen **OR** acetaminophen, sodium chloride, oxyCODONE-acetaminophen **AND** oxyCODONE  OBJECTIVE:  Patient Vitals for the past 24 hrs:   BP Temp Temp src Pulse Resp SpO2   01/04/21 0900 93/62 97.6 °F (36.4 °C) Oral 71 20 95 %   01/03/21 2003      91 %   01/03/21 2001 96/60 97.8 °F (36.6 °C) Oral 65 18 (!) 88 %   01/03/21 1515 101/64 97 °F (36.1 °C) Axillary 63 18 92 %     Constitutional: The patient is sitting up in bed. He is awake and alert. Down to 6 L nasal cannula. Skin: Warm and dry. No rashes were noted. Head: Eyes show round, and reactive pupils. No jaundice. Mouth: Moist mucous membranes, no ulcerations, no thrush. Neck: Supple to movements. No lymphadenopathy. Chest: N good breath sounds. Crackles on bases. Cardiovascular: Heart sounds rhythmic and regular. Abdomen: Positive bowel sounds to auscultation. Benign to palpation. Extremities: No edema.   PIV    Laboratory and Tests Review:  Lab Results   Component Value Date    WBC 5.3 01/04/2021    WBC 3.3 (L) 01/03/2021    WBC 3.2 (L) 01/02/2021    HGB 17.4 (H) 01/04/2021    HCT 54.0 01/04/2021    MCV 90.5 01/04/2021     01/04/2021     Lab Results   Component Value Date    NEUTROABS 1.93 01/02/2021    NEUTROABS 3.31 01/01/2021    NEUTROABS 5.29 10/17/2020     Lab Results   Component Value Date    CRP 0.7 (H) 01/02/2021    CRP 4.1 (H) 10/17/2020    CRP 1.8 (H) 10/16/2020     Lab Results   Component Value Date    SEDRATE 0 01/02/2021    SEDRATE 1 10/17/2020    SEDRATE 0 10/16/2020     Lab Results   Component Value Date    ALT 9 01/04/2021    AST 20 01/04/2021    ALKPHOS 63 01/04/2021    BILITOT <0.2 01/04/2021     Lab Results   Component Value Date     01/04/2021    K 4.3 01/04/2021    K 3.8 01/02/2021     01/04/2021    CO2 22 01/04/2021    BUN 10 01/04/2021    CREATININE 0.6 01/04/2021    GFRAA >60 01/04/2021    LABGLOM >60 01/04/2021    GLUCOSE 116 01/04/2021    PROT 6.1 01/04/2021    LABALBU 3.6 01/04/2021    CALCIUM 9.3 01/04/2021    BILITOT <0.2 01/04/2021    ALKPHOS 63 01/04/2021    AST 20 01/04/2021    ALT 9 01/04/2021      SARS-COV-2 biomarkers    Recent Labs     01/02/21  0500 01/02/21  1755 01/03/21  1720 01/04/21  0435   CRP  --  0.7*  --   --    PROCAL 0.09*  --   --   --    FERRITIN  --  79  --   --    LDH  --  245*  --   --    TROPONINI  --  <0.01  --   --    DDIMER  --  246 <200  --    FIBRINOGEN  --  372  --   --    AST 19  --  22 20   ALT 7  --  10 9     Lab Results   Component Value Date    CHOL 145 10/16/2020    TRIG 48 10/16/2020    HDL 56 10/16/2020    [address] 79 10/16/2020    LABVLDL 10 10/16/2020     Radiology:  Reviewed     Microbiology:   1/1/2021- blood cx- no growth to date  AFB culture- pending     ASSESSMENT: · SARS-CoV-2 infection with pneumonia, improving. He is down to 6 L from a baseline of 3.5 L at home  · COPD  · MAC infection of the lung, currently on 3 antimycobacterial agents since August 2019  · Leukopenia with lymphopenia associated to the above    PLAN:  · Continue Remdesivir - Day #3 of 5   · Continue Rifampin, Ethambutol, and Clarithromycin-  · Check cultures- follow AFB cultures  · Currently on 8 liters - monitor respiratory status.    · Monitor labs    Aby Bowen  1/4/2021  1:41 PM

## 2021-01-04 NOTE — PROGRESS NOTES
Physical Therapy    Facility/Department: [facility] MED SURG  Initial Assessment    NAME: Antony Antoine  : 1950  MRN: [id number]    Date of Service: 2021       REQUIRES PT FOLLOW UP: Yes       Patient Diagnosis(es): The primary encounter diagnosis was Acute on chronic respiratory failure with hypoxia ([address] Diagnoses of Suspected COVID-19 virus infection, Leukopenia, unspecified type, and Hypoxia were also pertinent to this visit. 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 has a past surgical history that includes Abscess Drainage (); Colonoscopy (2013); lumbar fusion (2019); bronchoscopy (N/A, 2019); and bronchoscopy (N/A, 10/7/2019). Evaluating Therapist: Raysa Fabian, PT     Referring Provider:  Dr. Leandro Prasad #:  [id number]  DIAGNOSIS:  Acute hypoxemic resp failure   PRECAUTIONS: falls, O2@ 7 L via high flow cannula     Social:  Pt lives with  Wife  in a 1  floor plan  3  steps and  1 rails to enter. Prior to admission pt walked with  No AD, independent      Initial Evaluation  Date:  2021  Treatment      Short Term/ Long Term   Goals   Was pt agreeable to Eval/treatment?   yes      Does pt have pain?  none reported      Bed Mobility  Rolling:  Independent Supine to sit: independent   Sit to supine:  NT   Scooting: independent   Independent    Transfers Sit to stand:  S/I   Stand to sit: S/I   Stand pivot:  Supervision   independent    Ambulation    60  feet with no AD  with  Supervision    150  feet with  No AD  with  Independent        Stair negotiation: ascended and descended NT   TBA    LE ROM  WFL     LE strength  WFL      AM- PAC RAW score  20/ 24            Pt is alert and Oriented x  3      Balance:  S/I   Endurance: decreased   Bed/Chair alarm:  No      ASSESSMENT  Pt displays functional ability as noted in the objective portion of this evaluation. Treatment/Education:    Mobility as above. Pulse ox at rest 94%, decreased to 86% with mobility, recovered to 96% with O2@ 7 L        Pt educated on fall risk, use of incentive spirometer        Patient response to education:   Pt verbalized understanding Pt demonstrated skill Pt requires further education in this area   x  x  x       Comments:  Pt left  In chair after session, with call light in reach. Rehab potential is Good for reaching above PT goals. Pts/ family goals   1. None stated     Patient and or family understand(s) diagnosis, prognosis, and plan of care. -  Yes     PLAN  PT care will be provided in accordance with the objectives noted above. Whenever appropriate, clear delegation orders will be provided for nursing staff. Exercises and functional mobility practice will be used as well as appropriate assistive devices or modalities to obtain goals. Patient and family education will also be administered as needed. PLAN OF CARE:    Current Treatment Recommendations     [x] Strengthening     [] ROM   [x] Balance Training   [x] Endurance Training   [x] Transfer Training   [x] Gait Training   [] Stair Training   [] Positioning   [x] Safety and Education Training   [x] Patient/Caregiver Education   [] HEP  [] Other       Frequency of treatments will be 2-5x/week x  7/14 days. Time in: 1120   Time out: 1132      Evaluation Time includes thorough review of current medical information, gathering information on past medical history/social history and prior level of function, completion of standardized testing/informal observation of tasks, assessment of data and education on plan of care and goals.     CPT codes:  [x] Low Complexity PT evaluation 37939  [] Moderate Complexity PT evaluation 16247  [] High Complexity PT evaluation 02479  [] PT Re-evaluation 01917  [] Gait training 62993  minutes  [] Therapeutic activities 42710  minutes  [] Therapeutic exercises 75248  minutes  [] Neuromuscular reeducation 33836  minutes       Jose 18 number:  PT 0456

## 2021-01-04 NOTE — PROGRESS NOTES
Occupational Therapy  OCCUPATIONAL THERAPY INITIAL EVALUATION      Date:2021  Patient Name: Beto Don  MRN: [id number]  : 1950  Room: [address] [place], [place] [address]    Referring Provider: Vesna Walsh MD    Evaluating OT: Sarina Krishna OTR/L [id number]    AM-PAC Daily Activity Raw Score:     Recommended Adaptive Equipment: TBD    Diagnosis: acute hypoxemic respiratory failure. Pt presents to ED from home with fever, SOB, temperature and nausea. Pertinent Medical History: h/o back surgery has use of LSO for comfort d/t chronic back pain, COPD   Precautions:  Falls, droplet plus isolation COVID, high flow O2     Home Living: Pt lives with wife in a single story home with 2 steps HR on entry. Bathroom setup: walk in shower with seat. Home O2 3L.     Prior Level of Function: Mod I with ADLs, Mod I with IADLs; completed functional mobility no AD. Has WW     Pain Level: no reported pain    Cognition: A&O: . Problem solving:  WFL   Judgement/safety:  [facility]     Functional Assessment:   Initial Eval Status  Date: 20 Treatment session:  Short Term Goals     Feeding Independent     Grooming Set up  Independent   UB Dressing Set up  Independent   LB Dressing Supervision  Donning slippers  Limited forward trunk flexion d/t O2 desaturation  Mod I    Bathing SBA  Mod I   Toileting SBA  Mod I   Bed Mobility  Supine to sit: Independent     Functional Transfers STS: Supervision  Independent   Functional Mobility Supervision with no AD  Household distance  Limited further d/t O2 desaturation  Independent during ADLs   Balance Sitting: good minus    Standing: fair no AD     Activity Tolerance Fair minus  7L O2 restin%  With activity: 86%  Moderate recovery time in sitting with pursed lip breathing  standing jo x7-8 min with good minus balance during self care tasks           Provided incentive spirometer and instructed on use. Able to return demonstrate 1000mL. Treatment: Patient educated on techniques for completion of ADL, safe functional transfers and functional mobility. Patient required cues for follow through with proper hand/foot placement, pacing, safety and technique in bed mobility, functional transfers, functional mobility and LB dressing in preparation for maximum independence in all self care tasks.      Hand Dominance: Right []  Left []   Strength ROM Additional Info:    RUE  5/5 WFL good  and FMC/dexterity noted during ADL tasks     LUE 5/5 WFL good  and FMC/dexterity noted during ADL tasks         Hearing: WFL   Vision: WFL   Sensation:  No c/o numbness or tingling   Tone: WFL   Edema: none                             Long Term Goal (1-3 wks): Pt will maximize functional performance in all self care tasks/functional transfers with good follow through of all trained techniques for safe transition to next level of care    Assessment of current deficits   Functional mobility [x]  ADLs [x] Strength [x]  Cognition []  Functional transfers  [x] IADLs [x] Safety Awareness [x]  Endurance [x]  Fine Motor Coordination [] Balance [x] Vision/perception [] Sensation []   Gross Motor Coordination [] ROM [] Delirium []                  Motor Control []    Plan of Care: 1-3 days/week for 1-2 weeks PRN   [x]ADL retraining/adaptive techniques and AE recommendations to increase functional independence within precautions                    [x]Energy conservation techniques to improve tolerance for ADL/IADLs  [x]Functional transfer/mobility training/DME recommendations for increased independence, safety and fall prevention         [x]Patient/family education to increase safety and functional independence during daily routine          [x]Environmental modifications for safe mobility and completion of ADLs                             []Cognitive retraining to improve problem solving skills & safe participation in ADLs/IADLs

## 2021-01-05 LAB
ALBUMIN SERPL-MCNC: 3.8 G/DL (ref 3.5–5.2)
ALP BLD-CCNC: 62 U/L (ref 40–129)
ALT SERPL-CCNC: 12 U/L (ref 0–40)
ANION GAP SERPL CALCULATED.3IONS-SCNC: 7 MMOL/L (ref 7–16)
AST SERPL-CCNC: 19 U/L (ref 0–39)
BILIRUB SERPL-MCNC: 0.3 MG/DL (ref 0–1.2)
BUN BLDV-MCNC: 10 MG/DL (ref 8–23)
CALCIUM SERPL-MCNC: 9.8 MG/DL (ref 8.6–10.2)
CHLORIDE BLD-SCNC: 101 MMOL/L (ref 98–107)
CO2: 24 MMOL/L (ref 22–29)
CREAT SERPL-MCNC: 0.6 MG/DL (ref 0.7–1.2)
GFR AFRICAN AMERICAN: >60
GFR NON-AFRICAN AMERICAN: >60 ML/MIN/1.73
GLUCOSE BLD-MCNC: 125 MG/DL (ref 74–99)
HCT VFR BLD CALC: 55.4 % (ref 37–54)
HEMOGLOBIN: 18 G/DL (ref 12.5–16.5)
MCH RBC QN AUTO: 29 PG (ref 26–35)
MCHC RBC AUTO-ENTMCNC: 32.5 % (ref 32–34.5)
MCV RBC AUTO: 89.4 FL (ref 80–99.9)
PDW BLD-RTO: 14.7 FL (ref 11.5–15)
PLATELET # BLD: 147 E9/L (ref 130–450)
PMV BLD AUTO: 10 FL (ref 7–12)
POTASSIUM SERPL-SCNC: 4.6 MMOL/L (ref 3.5–5)
RBC # BLD: 6.2 E12/L (ref 3.8–5.8)
SODIUM BLD-SCNC: 132 MMOL/L (ref 132–146)
TOTAL PROTEIN: 6.3 G/DL (ref 6.4–8.3)
WBC # BLD: 5.7 E9/L (ref 4.5–11.5)

## 2021-01-05 PROCEDURE — 6370000000 HC RX 637 (ALT 250 FOR IP): Performed by: INTERNAL MEDICINE

## 2021-01-05 PROCEDURE — 2500000003 HC RX 250 WO HCPCS: Performed by: INTERNAL MEDICINE

## 2021-01-05 PROCEDURE — 2060000000 HC ICU INTERMEDIATE R&B

## 2021-01-05 PROCEDURE — 6360000002 HC RX W HCPCS: Performed by: INTERNAL MEDICINE

## 2021-01-05 PROCEDURE — 36415 COLL VENOUS BLD VENIPUNCTURE: CPT

## 2021-01-05 PROCEDURE — 2700000000 HC OXYGEN THERAPY PER DAY

## 2021-01-05 PROCEDURE — 80053 COMPREHEN METABOLIC PANEL: CPT

## 2021-01-05 PROCEDURE — 2580000003 HC RX 258: Performed by: INTERNAL MEDICINE

## 2021-01-05 PROCEDURE — 85027 COMPLETE CBC AUTOMATED: CPT

## 2021-01-05 PROCEDURE — 6360000002 HC RX W HCPCS: Performed by: SPECIALIST

## 2021-01-05 RX ADMIN — Medication 10 ML: at 10:12

## 2021-01-05 RX ADMIN — ZINC SULFATE 220 MG (50 MG) CAPSULE 50 MG: CAPSULE at 09:55

## 2021-01-05 RX ADMIN — TIOTROPIUM BROMIDE 18 MCG: 18 CAPSULE ORAL; RESPIRATORY (INHALATION) at 10:37

## 2021-01-05 RX ADMIN — OXYCODONE HYDROCHLORIDE AND ACETAMINOPHEN 500 MG: 500 TABLET ORAL at 09:55

## 2021-01-05 RX ADMIN — Medication 10 ML: at 22:21

## 2021-01-05 RX ADMIN — REMDESIVIR 100 MG: 100 INJECTION, POWDER, LYOPHILIZED, FOR SOLUTION INTRAVENOUS at 14:09

## 2021-01-05 RX ADMIN — Medication 10.5 MG: at 22:18

## 2021-01-05 RX ADMIN — DEXAMETHASONE 6 MG: 4 TABLET ORAL at 22:18

## 2021-01-05 RX ADMIN — FAMOTIDINE 20 MG: 20 TABLET, FILM COATED ORAL at 09:55

## 2021-01-05 RX ADMIN — Medication 2000 UNITS: at 09:55

## 2021-01-05 RX ADMIN — ENOXAPARIN SODIUM 40 MG: 40 INJECTION SUBCUTANEOUS at 22:19

## 2021-01-05 RX ADMIN — DEXAMETHASONE 6 MG: 4 TABLET ORAL at 09:55

## 2021-01-05 RX ADMIN — BUDESONIDE AND FORMOTEROL FUMARATE DIHYDRATE 2 PUFF: 80; 4.5 AEROSOL RESPIRATORY (INHALATION) at 09:54

## 2021-01-05 ASSESSMENT — PAIN SCALES - GENERAL
PAINLEVEL_OUTOF10: 0
PAINLEVEL_OUTOF10: 0

## 2021-01-05 NOTE — PROGRESS NOTES
[address] [place] [address] [facility]  [facility]  Progress Note  CC: shortness of breath  Face to face encounter  SUBJECTIVE:  Feeling well. He still has a productive sputum. Tolerating current medications. Review of systems: As above, otherwise unremarkable. Medications:  Scheduled Meds:   clarithromycin  500 mg Oral 2 times per day on Mon Wed Fri    ethambutol  400 mg Oral 2 times per day on Mon Wed Fri    rifAMPin  300 mg Oral 2 times per day on Mon Wed Fri    melatonin  10.5 mg Oral Nightly    Vitamin D  2,000 Units Oral Daily    ascorbic acid  500 mg Oral Daily    zinc sulfate  50 mg Oral Daily    famotidine  20 mg Oral Daily    dexamethasone  6 mg Oral 2 times per day    sodium chloride flush  10 mL Intravenous 2 times per day    enoxaparin  40 mg Subcutaneous Daily    tiotropium  18 mcg Inhalation Daily    And    budesonide-formoterol  2 puff Inhalation BID     Continuous Infusions:  PRN Meds:albuterol, sodium chloride flush, promethazine **OR** ondansetron, polyethylene glycol, acetaminophen **OR** acetaminophen, sodium chloride, oxyCODONE-acetaminophen **AND** oxyCODONE  OBJECTIVE:  Patient Vitals for the past 24 hrs:   BP Temp Temp src Pulse Resp SpO2   01/05/21 1445 102/61 98 °F (36.7 °C) Oral 66 18 92 %   01/05/21 0945 104/67 97.7 °F (36.5 °C) Oral 69 18 93 %   01/04/21 2145 94/60 97.5 °F (36.4 °C) Oral 65 20 93 %     Constitutional: The patient is sitting up in bed. He is awake and alert. Down to 6 L nasal cannula. Skin: Warm and dry. No rashes were noted. Head: Eyes show round, and reactive pupils. No jaundice. Mouth: Moist mucous membranes, no ulcerations, no thrush. Neck: Supple to movements. No lymphadenopathy. Chest: N good breath sounds. Crackles on bases. Cardiovascular: Heart sounds rhythmic and regular. Abdomen: Positive bowel sounds to auscultation. Benign to palpation. Extremities: No edema.   PIV    Laboratory and Tests Review:  Lab Results Component Value Date    WBC 5.7 01/05/2021    WBC 5.3 01/04/2021    WBC 3.3 (L) 01/03/2021    HGB 18.0 (H) 01/05/2021    HCT 55.4 (H) 01/05/2021    MCV 89.4 01/05/2021     01/05/2021     Lab Results   Component Value Date    NEUTROABS 1.93 01/02/2021    NEUTROABS 3.31 01/01/2021    NEUTROABS 5.29 10/17/2020     Lab Results   Component Value Date    CRP 0.7 (H) 01/02/2021    CRP 4.1 (H) 10/17/2020    CRP 1.8 (H) 10/16/2020     Lab Results   Component Value Date    SEDRATE 0 01/02/2021    SEDRATE 1 10/17/2020    SEDRATE 0 10/16/2020     Lab Results   Component Value Date    ALT 12 01/05/2021    AST 19 01/05/2021    ALKPHOS 62 01/05/2021    BILITOT 0.3 01/05/2021     Lab Results   Component Value Date     01/05/2021    K 4.6 01/05/2021    K 3.8 01/02/2021     01/05/2021    CO2 24 01/05/2021    BUN 10 01/05/2021    CREATININE 0.6 01/05/2021    GFRAA >60 01/05/2021    LABGLOM >60 01/05/2021    GLUCOSE 125 01/05/2021    PROT 6.3 01/05/2021    LABALBU 3.8 01/05/2021    CALCIUM 9.8 01/05/2021    BILITOT 0.3 01/05/2021    ALKPHOS 62 01/05/2021    AST 19 01/05/2021    ALT 12 01/05/2021      SARS-COV-2 biomarkers    Recent Labs     01/02/21  1755 01/03/21  1720 01/04/21  0435 01/05/21  0500   CRP 0.7*  --   --   --    FERRITIN 79  --   --   --    *  --   --   --    TROPONINI <0.01  --   --   --    DDIMER 246 <200  --   --    FIBRINOGEN 372  --   --   --    AST  --  22 20 19   ALT  --  10 9 12     Lab Results   Component Value Date    CHOL 145 10/16/2020    TRIG 48 10/16/2020    HDL 56 10/16/2020    [address] 79 10/16/2020    LABVLDL 10 10/16/2020     Radiology:  Reviewed     Microbiology:   1/1/2021- blood cx- no growth to date  AFB culture- pending     ASSESSMENT:  · SARS-CoV-2 infection with pneumonia, improving.   He is down to 6 L from a baseline of 3.5 L at home  · COPD  · MAC infection of the lung, currently on 3 antimycobacterial agents since August 2019 · Leukopenia with lymphopenia associated to the above    PLAN:  · Continue Remdesivir, day 4 of 5  · Continue Rifampin, Ethambutol, and Clarithromycin 3 times weekly  · Check repeat AFB sputum culture  · Monitor labs    Yuri Silva  1/5/2021  2:54 PM

## 2021-01-05 NOTE — PROGRESS NOTES
Stool sample sent for C-DIF testing was rejected d/t being formed rather than liquid.     Electronically signed by Evans Curry RN on 1/5/2021 at 11:36 AM

## 2021-01-05 NOTE — PROGRESS NOTES
[facility]  [address] [facility], [address] [place] [address] Street  [facility], [address] [place] [address]      Pulmonary Progress Note      SUBJECTIVE:  Standing up brushing his teeth when I entered his room.  On  NC, claims doing ok with respiratory function with not much cough/congestion    OBJECTIVE    Medications    Continuous Infusions:    Scheduled Meds:   clarithromycin  500 mg Oral 2 times per day on     ethambutol  400 mg Oral 2 times per day on     rifAMPin  300 mg Oral 2 times per day on     melatonin  10.5 mg Oral Nightly    Vitamin D  2,000 Units Oral Daily    ascorbic acid  500 mg Oral Daily    zinc sulfate  50 mg Oral Daily    famotidine  20 mg Oral Daily    dexamethasone  6 mg Oral 2 times per day    sodium chloride flush  10 mL Intravenous 2 times per day    enoxaparin  40 mg Subcutaneous Daily    remdesivir IVPB  100 mg Intravenous Q24H    tiotropium  18 mcg Inhalation Daily    And    budesonide-formoterol  2 puff Inhalation BID       PRN Meds:albuterol, sodium chloride flush, promethazine **OR** ondansetron, polyethylene glycol, acetaminophen **OR** acetaminophen, sodium chloride, oxyCODONE-acetaminophen **AND** oxyCODONE    Physical    VITALS:  /67   Pulse 69   Temp 97.7 °F (36.5 °C) (Oral)   Resp 18   Ht 6' 1\" (1.854 m)   Wt 200 lb (90.7 kg)   SpO2 93%   BMI 26.39 kg/m²     24HR INTAKE/OUTPUT:      Intake/Output Summary (Last 24 hours) at 2021 1351  Last data filed at 2021 0945  Gross per 24 hour   Intake 360 ml   Output    Net 360 ml       24HR PULSE OXIMETRY RANGE:    SpO2  Av.7 %  Min: 92 %  Max: 93 %    General appearance: alert, appears stated age and cooperative  Lungs: rhonchi bibasilar  Heart: regular rate and rhythm, S1, S2 normal, no murmur, click, rub or gallop  Abdomen: soft, non-tender; bowel sounds normal; no masses,  no organomegaly

## 2021-01-05 NOTE — CARE COORDINATION
Social work / Discharge Planning:       [address]. Has home oxygen from [facility] (3 liters continuous) and currently on 6 liters. AM PAC 20/24. Discharge plan is home. Social work will continue to follow to assist with discharge planning.  Electronically signed by MALVIN Scanlon on 1/5/2021 at 11:44 AM

## 2021-01-05 NOTE — PLAN OF CARE
Problem: Airway Clearance - Ineffective  Goal: Achieve or maintain patent airway  Outcome: Met This Shift     Problem: Gas Exchange - Impaired  Goal: Absence of hypoxia  Outcome: Met This Shift     Problem: Fatigue  Goal: Verbalize increase energy and improved vitality  Outcome: Met This Shift     Problem: Nutrition Deficits  Goal: Optimize nutrtional status  Outcome: Met This Shift

## 2021-01-06 ENCOUNTER — APPOINTMENT (OUTPATIENT)
Dept: GENERAL RADIOLOGY | Age: 71
DRG: 177 | End: 2021-01-06
Payer: MEDICARE

## 2021-01-06 LAB
BLOOD CULTURE, ROUTINE: NORMAL
CULTURE, BLOOD 2: NORMAL
PROCALCITONIN: 0.05 NG/ML (ref 0–0.08)

## 2021-01-06 PROCEDURE — 2060000000 HC ICU INTERMEDIATE R&B

## 2021-01-06 PROCEDURE — 87077 CULTURE AEROBIC IDENTIFY: CPT

## 2021-01-06 PROCEDURE — 87206 SMEAR FLUORESCENT/ACID STAI: CPT

## 2021-01-06 PROCEDURE — 87070 CULTURE OTHR SPECIMN AEROBIC: CPT

## 2021-01-06 PROCEDURE — 2580000003 HC RX 258: Performed by: SPECIALIST

## 2021-01-06 PROCEDURE — 6370000000 HC RX 637 (ALT 250 FOR IP): Performed by: INTERNAL MEDICINE

## 2021-01-06 PROCEDURE — 6360000002 HC RX W HCPCS: Performed by: SPECIALIST

## 2021-01-06 PROCEDURE — 71045 X-RAY EXAM CHEST 1 VIEW: CPT

## 2021-01-06 PROCEDURE — 6360000002 HC RX W HCPCS: Performed by: INTERNAL MEDICINE

## 2021-01-06 PROCEDURE — 87186 SC STD MICRODIL/AGAR DIL: CPT

## 2021-01-06 PROCEDURE — 2700000000 HC OXYGEN THERAPY PER DAY

## 2021-01-06 PROCEDURE — 87205 SMEAR GRAM STAIN: CPT

## 2021-01-06 PROCEDURE — 2580000003 HC RX 258: Performed by: INTERNAL MEDICINE

## 2021-01-06 PROCEDURE — 84145 PROCALCITONIN (PCT): CPT

## 2021-01-06 PROCEDURE — 87449 NOS EACH ORGANISM AG IA: CPT

## 2021-01-06 RX ORDER — FAMOTIDINE 20 MG/1
20 TABLET, FILM COATED ORAL DAILY
Qty: 30 TABLET | Refills: 0 | Status: SHIPPED | OUTPATIENT
Start: 2021-01-06 | End: 2021-05-13

## 2021-01-06 RX ORDER — DEXAMETHASONE 6 MG/1
6 TABLET ORAL
Qty: 5 TABLET | Refills: 0 | Status: SHIPPED | OUTPATIENT
Start: 2021-01-06 | End: 2021-01-08

## 2021-01-06 RX ORDER — LANOLIN ALCOHOL/MO/W.PET/CERES
9 CREAM (GRAM) TOPICAL NIGHTLY
Qty: 50 TABLET | Refills: 0 | COMMUNITY
Start: 2021-01-06 | End: 2021-05-13

## 2021-01-06 RX ORDER — GUAIFENESIN/DEXTROMETHORPHAN 100-10MG/5
10 SYRUP ORAL EVERY 4 HOURS PRN
Status: DISCONTINUED | OUTPATIENT
Start: 2021-01-06 | End: 2021-01-08 | Stop reason: HOSPADM

## 2021-01-06 RX ORDER — ZINC SULFATE 50(220)MG
50 CAPSULE ORAL DAILY
Qty: 20 CAPSULE | Refills: 3 | COMMUNITY
Start: 2021-01-06 | End: 2021-04-07 | Stop reason: ALTCHOICE

## 2021-01-06 RX ADMIN — ETHAMBUTOL HYDROCHLORIDE 400 MG: 400 TABLET ORAL at 16:56

## 2021-01-06 RX ADMIN — Medication 2000 UNITS: at 09:23

## 2021-01-06 RX ADMIN — TIOTROPIUM BROMIDE 18 MCG: 18 CAPSULE ORAL; RESPIRATORY (INHALATION) at 09:23

## 2021-01-06 RX ADMIN — CEFEPIME 2 G: 2 INJECTION, POWDER, FOR SOLUTION INTRAMUSCULAR; INTRAVENOUS at 16:03

## 2021-01-06 RX ADMIN — CLARITHROMYCIN 500 MG: 500 TABLET ORAL at 16:56

## 2021-01-06 RX ADMIN — BUDESONIDE AND FORMOTEROL FUMARATE DIHYDRATE 2 PUFF: 80; 4.5 AEROSOL RESPIRATORY (INHALATION) at 09:23

## 2021-01-06 RX ADMIN — FAMOTIDINE 20 MG: 20 TABLET, FILM COATED ORAL at 09:23

## 2021-01-06 RX ADMIN — ENOXAPARIN SODIUM 40 MG: 40 INJECTION SUBCUTANEOUS at 20:58

## 2021-01-06 RX ADMIN — Medication 10.5 MG: at 20:58

## 2021-01-06 RX ADMIN — GUAIFENESIN AND DEXTROMETHORPHAN 10 ML: 100; 10 SYRUP ORAL at 12:15

## 2021-01-06 RX ADMIN — Medication 10 ML: at 09:26

## 2021-01-06 RX ADMIN — Medication 10 ML: at 20:58

## 2021-01-06 RX ADMIN — RIFAMPIN 300 MG: 300 CAPSULE ORAL at 09:23

## 2021-01-06 RX ADMIN — DEXAMETHASONE 6 MG: 4 TABLET ORAL at 20:58

## 2021-01-06 RX ADMIN — ZINC SULFATE 220 MG (50 MG) CAPSULE 50 MG: CAPSULE at 09:23

## 2021-01-06 RX ADMIN — RIFAMPIN 300 MG: 300 CAPSULE ORAL at 16:55

## 2021-01-06 RX ADMIN — SALINE NASAL SPRAY 1 SPRAY: 1.5 SOLUTION NASAL at 16:03

## 2021-01-06 RX ADMIN — OXYCODONE HYDROCHLORIDE AND ACETAMINOPHEN 500 MG: 500 TABLET ORAL at 09:23

## 2021-01-06 RX ADMIN — CLARITHROMYCIN 500 MG: 500 TABLET ORAL at 09:23

## 2021-01-06 RX ADMIN — ETHAMBUTOL HYDROCHLORIDE 400 MG: 400 TABLET ORAL at 09:23

## 2021-01-06 RX ADMIN — DEXAMETHASONE 6 MG: 4 TABLET ORAL at 09:23

## 2021-01-06 ASSESSMENT — PAIN SCALES - GENERAL: PAINLEVEL_OUTOF10: 0

## 2021-01-06 NOTE — CARE COORDINATION
Social work / Discharge Planning:       [address]. Discharge plan is home. He has home oxygen from [facility] (3 liters) and is currently requiring 6 liters high flow oxygen per pulse ox testing. Social work continues to follow to assist with discharge planning.   Electronically signed by MALVIN Hopkins on 1/6/2021 at 12:54 PM

## 2021-01-06 NOTE — PROGRESS NOTES
Subjective:  Feeling better   No CP or SOB  No fever or chills   No uncontrolled pain  No vomiting /diarrhea        Objective:    BP (!) 96/54   Pulse 65   Temp 97.6 °F (36.4 °C) (Oral)   Resp 18   Ht 6' 1\" (1.854 m)   Wt 200 lb (90.7 kg)   SpO2 93%   BMI 26.39 kg/m²     24HR INTAKE/OUTPUT:      Intake/Output Summary (Last 24 hours) at 1/6/2021 0900  Last data filed at 1/5/2021 0945  Gross per 24 hour   Intake 360 ml   Output    Net 360 ml       General appearance: NAD, conversant  Neck: FROM, supple   Lungs: Clear bilaterally no wheezes, no rhonchi, no crackles  CV: RRR, no MRGs; normal carotid upstroke and amplitude without Bruits  Abdomen: Soft, non-tender; no masses or HSM  Extremities: No edema, no cyanosis, no clubbing  Skin: Intact no rash, no lesions, no ulcers    Psych: Alert and oriented normal affect  Neuro: Nonfocal  Most Recent Labs  Lab Results   Component Value Date    WBC 5.7 01/05/2021    HGB 18.0 (H) 01/05/2021    HCT 55.4 (H) 01/05/2021     01/05/2021     01/05/2021    K 4.6 01/05/2021     01/05/2021    CREATININE 0.6 (L) 01/05/2021    BUN 10 01/05/2021    CO2 24 01/05/2021    GLUCOSE 125 (H) 01/05/2021    ALT 12 01/05/2021    AST 19 01/05/2021    INR 1.1 05/30/2019    TSH 1.290 10/16/2020    LABA1C 5.5 10/16/2020     No results for input(s): MG in the last 72 hours. Lab Results   Component Value Date    CALCIUM 9.8 01/05/2021    PHOS 2.4 (L) 10/17/2020        US DUP LOWER EXTREMITY LEFT MARINA   Final Result   No evidence of DVT in the left lower extremity. XR CHEST PORTABLE   Final Result   1. Patchy bilateral pneumonia   2. Advanced emphysematous changes with interstitial fibrosis.           Assessment    Principal Problem:    SARS-associated coronavirus infection  Active Problems:    COPD exacerbation ([facility]    Pulmonary emphysema with fibrosis of lung ([facility]    SADE (mycobacterium avium-intracellulare) ([facility]    [facility]    Hypoxia Resolved Problems:    * No resolved hospital problems. 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  Difficile-- specimen rejected as too formed  Medications for other co morbidities cont as appropriate w dosage adjustments as necessary  PT/OT  DVT PPx  DC planning home w [place] [facility] tmrw if O2 stable on 6L      Electronically signed by Carloz Blount MD on 1/6/2021 at 9:00 AM

## 2021-01-06 NOTE — PROGRESS NOTES
[facility]  [address], [address]  ANJANA JEFFERY [facility], [address] [place] [address]      Pulmonary Progress Note      SUBJECTIVE:  Feeling some worse with breathing today, not much different with cough. On 6  NC saturating about 87%, lying down in bed when seen.     OBJECTIVE    Medications    Continuous Infusions:    Scheduled Meds:   clarithromycin  500 mg Oral 2 times per day on     ethambutol  400 mg Oral 2 times per day on     rifAMPin  300 mg Oral 2 times per day on     melatonin  10.5 mg Oral Nightly    Vitamin D  2,000 Units Oral Daily    ascorbic acid  500 mg Oral Daily    zinc sulfate  50 mg Oral Daily    famotidine  20 mg Oral Daily    dexamethasone  6 mg Oral 2 times per day    sodium chloride flush  10 mL Intravenous 2 times per day    enoxaparin  40 mg Subcutaneous Daily    tiotropium  18 mcg Inhalation Daily    And    budesonide-formoterol  2 puff Inhalation BID       PRN Meds:guaiFENesin-dextromethorphan, albuterol, sodium chloride flush, promethazine **OR** ondansetron, polyethylene glycol, acetaminophen **OR** acetaminophen, sodium chloride, oxyCODONE-acetaminophen **AND** oxyCODONE    Physical    VITALS:  /65   Pulse 80   Temp 98.1 °F (36.7 °C) (Oral)   Resp 20   Ht 6' 1\" (1.854 m)   Wt 200 lb (90.7 kg)   SpO2 91%   BMI 26.39 kg/m²     24HR INTAKE/OUTPUT:      Intake/Output Summary (Last 24 hours) at 2021 1429  Last data filed at 2021 1029  Gross per 24 hour   Intake 360 ml   Output    Net 360 ml       24HR PULSE OXIMETRY RANGE:    SpO2  Av %  Min: 91 %  Max: 93 %    General appearance: alert, appears stated age and cooperative  Lungs: rhonchi bibasilar, minimal wheezing with cough  Heart: regular rate and rhythm, S1, S2 normal, no murmur, click, rub or gallop  Abdomen: soft, non-tender; bowel sounds normal; no masses,  no organomegaly Extremities: extremities normal, atraumatic, no cyanosis or edema  Neurologic: Mental status: Alert, oriented, thought content appropriate    Data    CBC:   Recent Labs     01/03/21  1515 01/04/21  0435 01/05/21  0500   WBC 3.3* 5.3 5.7   HGB 17.5* 17.4* 18.0*   HCT 52.7 54.0 55.4*   MCV 89.9 90.5 89.4    147 147       BMP:  Recent Labs     01/03/21  1720 01/04/21  0435 01/05/21  0500    132 132   K 4.2 4.3 4.6    103 101   CO2 24 22 24   BUN 10 10 10   CREATININE 0.7 0.6* 0.6*    ALB:3,BILIDIR:3,BILITOT:3,ALKPHOS:3)@    PT/INR: No results for input(s): PROTIME, INR in the last 72 hours. ABG:   No results for input(s): PH, PO2, PCO2, HCO3, BE, O2SAT, METHB, O2HB, COHB, O2CON, HHB, THB in the last 72 hours. Radiology/Other tests reviewed: CXR reviewed looking similar to previous    Assessment:     Principal Problem:    SARS-associated coronavirus infection  Active Problems:    COPD exacerbation ([facility]    Pulmonary emphysema with fibrosis of lung ([facility]    SADE (mycobacterium avium-intracellulare) ([facility]    Acute hypoxemic respiratory failure (HCC)    Hypoxia  Resolved Problems:    * No resolved hospital problems. *      Plan:       1. Cont with oxygen, taper as tolerated, prone positioning when able to, will need new oxygen order due to current requirements  2. Cont with steroids, taper as tolerated  3. Cont with antibiotics for SADE  4. Cont with MDI, observe respiratory function and cough/sputum production  5. Incentive spirometer and observe respiratory function and cough  6. Nasal saline bid and see if helps with congestion  7. Last dose of remdesivir today, observe respiratory function  8. OOB to chair, PT/OT if needed      Time at the bedside, reviewing labs and radiographs, reviewing notes and consultations, discussing with staff and family was more than 35 minutes. Thanks for letting us see this patient in consultation. Please contact us with any questions. Office [phone] or after hours through [facility], x [phone].

## 2021-01-06 NOTE — PROGRESS NOTES
SpO2 on 6L HIGH FLOW 91% at rest    SpO2 on 6L HIGH FLOW 85% ambulating. Pt SpO2 recovered with 6L HIGH FLOW at 90%.     Electronically signed by Babatunde Thomas RN on 1/6/2021 at 9:39 AM

## 2021-01-06 NOTE — PROGRESS NOTES
[facility] Quality Flow/Interdisciplinary Rounds Progress Note        Quality Flow Rounds held on January 6, 2021    Disciplines Attending:  Bedside Nurse, ,  and Nursing Unit Leadership    Antony Antoine was admitted on 1/1/2021  8:48 AM    Anticipated Discharge Date:       Disposition:    Sandro Score:  Sandro Scale Score: 22    Readmission Risk              Risk of Unplanned Readmission:        10           Discussed patient goal for the day, patient clinical progression, and barriers to discharge. The following Goal(s) of the Day/Commitment(s) have been identified:  Wean oxygen, continue redesivir day 5, continue PO decadron Q12.     Lexis Saunders  January 6, 2021

## 2021-01-06 NOTE — PROGRESS NOTES
Subjective:  Feeling better   No CP or SOB  No fever or chills   No uncontrolled pain  No vomiting /diarrhea        Objective:    /61   Pulse 66   Temp 98 °F (36.7 °C) (Oral)   Resp 18   Ht 6' 1\" (1.854 m)   Wt 200 lb (90.7 kg)   SpO2 92%   BMI 26.39 kg/m²     24HR INTAKE/OUTPUT:      Intake/Output Summary (Last 24 hours) at 1/5/2021 1922  Last data filed at 1/5/2021 0945  Gross per 24 hour   Intake 360 ml   Output    Net 360 ml       General appearance: NAD, conversant  Neck: FROM, supple   Lungs: Clear bilaterally no wheezes, no rhonchi, no crackles  CV: RRR, no MRGs; normal carotid upstroke and amplitude without Bruits  Abdomen: Soft, non-tender; no masses or HSM  Extremities: No edema, no cyanosis, no clubbing  Skin: Intact no rash, no lesions, no ulcers    Psych: Alert and oriented normal affect  Neuro: Nonfocal  Most Recent Labs  Lab Results   Component Value Date    WBC 5.7 01/05/2021    HGB 18.0 (H) 01/05/2021    HCT 55.4 (H) 01/05/2021     01/05/2021     01/05/2021    K 4.6 01/05/2021     01/05/2021    CREATININE 0.6 (L) 01/05/2021    BUN 10 01/05/2021    CO2 24 01/05/2021    GLUCOSE 125 (H) 01/05/2021    ALT 12 01/05/2021    AST 19 01/05/2021    INR 1.1 05/30/2019    TSH 1.290 10/16/2020    LABA1C 5.5 10/16/2020     No results for input(s): MG in the last 72 hours. Lab Results   Component Value Date    CALCIUM 9.8 01/05/2021    PHOS 2.4 (L) 10/17/2020        US DUP LOWER EXTREMITY LEFT MARINA   Final Result   No evidence of DVT in the left lower extremity. XR CHEST PORTABLE   Final Result   1. Patchy bilateral pneumonia   2. Advanced emphysematous changes with interstitial fibrosis.           Assessment    Principal Problem:    SARS-associated coronavirus infection  Active Problems:    COPD exacerbation ([facility]    Pulmonary emphysema with fibrosis of lung ([facility]    SADE (mycobacterium avium-intracellulare) ([facility]    Acute hypoxemic respiratory failure ([facility]    Hypoxia Resolved Problems:    * No resolved hospital problems. *      Plan:  72-year-old male history of Mycobacterium Avium admitted with COVID-19 and respiratory failure    SARS-CoV-2 PCR +   Isolation--droplet plus  Supplemental O2 wean as tolerated 92% on 6 L  MDIs, no nebulizers  Avoid NIPPV  Remdesivir completed 5 days  Decadron 10 days  Melatonin, Pepcid, Zinc, vitamin C, vitamin D  Monitor inflammatory markers  Pulmonology consult appreciated  ID consult appreciated- cont Abx for SADE  Check C.  Difficile-- specimen rejected as too formed  Medications for other co morbidities cont as appropriate w dosage adjustments as necessary  PT/OT  DVT PPx  DC planning [facility] w [facility] tmrw if O2 stable on 6L      Electronically signed by Annie Gutierrez MD on 1/5/2021 at 7:22 PM

## 2021-01-06 NOTE — PROGRESS NOTES
[address] [place] [address] [facility]  Progress Note  CC: shortness of breath  Face to face encounter  SUBJECTIVE:  Patient is not feeling so well. More dyspnea. He has a more productive sputum. Review of systems: As above, otherwise unremarkable. Medications:  Scheduled Meds:   sodium chloride  1 spray Each Nostril BID    clarithromycin  500 mg Oral 2 times per day on Mon Wed Fri    ethambutol  400 mg Oral 2 times per day on Mon Wed Fri    rifAMPin  300 mg Oral 2 times per day on Mon Wed Fri    melatonin  10.5 mg Oral Nightly    Vitamin D  2,000 Units Oral Daily    ascorbic acid  500 mg Oral Daily    zinc sulfate  50 mg Oral Daily    famotidine  20 mg Oral Daily    dexamethasone  6 mg Oral 2 times per day    sodium chloride flush  10 mL Intravenous 2 times per day    enoxaparin  40 mg Subcutaneous Daily    tiotropium  18 mcg Inhalation Daily    And    budesonide-formoterol  2 puff Inhalation BID     Continuous Infusions:  PRN Meds:guaiFENesin-dextromethorphan, albuterol, sodium chloride flush, promethazine **OR** ondansetron, polyethylene glycol, acetaminophen **OR** acetaminophen, sodium chloride, oxyCODONE-acetaminophen **AND** oxyCODONE  OBJECTIVE:  Patient Vitals for the past 24 hrs:   BP Temp Temp src Pulse Resp SpO2   01/06/21 0915 100/65 98.1 °F (36.7 °C) Oral 80 20 91 %   01/05/21 2215 (!) 96/54 97.6 °F (36.4 °C) Oral 65 18 93 %   01/05/21 1445 102/61 98 °F (36.7 °C) Oral 66 18 92 %     Constitutional: The patient is sitting up in bed. He is awake and alert. Down to 6 L nasal cannula. Skin: Warm and dry. No rashes were noted. Head: Eyes show round, and reactive pupils. No jaundice. Mouth: Moist mucous membranes, no ulcerations, no thrush. Neck: Supple to movements. No lymphadenopathy. Chest: N good breath sounds. Crackles on bases. Cardiovascular: Heart sounds rhythmic and regular. Abdomen: Positive bowel sounds to auscultation. Benign to palpation. Extremities: No edema. PIV    Laboratory and Tests Review:  Lab Results   Component Value Date    WBC 5.7 01/05/2021    WBC 5.3 01/04/2021    WBC 3.3 (L) 01/03/2021    HGB 18.0 (H) 01/05/2021    HCT 55.4 (H) 01/05/2021    MCV 89.4 01/05/2021     01/05/2021     Lab Results   Component Value Date    NEUTROABS 1.93 01/02/2021    NEUTROABS 3.31 01/01/2021    NEUTROABS 5.29 10/17/2020     Lab Results   Component Value Date    CRP 0.7 (H) 01/02/2021    CRP 4.1 (H) 10/17/2020    CRP 1.8 (H) 10/16/2020     Lab Results   Component Value Date    SEDRATE 0 01/02/2021    SEDRATE 1 10/17/2020    SEDRATE 0 10/16/2020     Lab Results   Component Value Date    ALT 12 01/05/2021    AST 19 01/05/2021    ALKPHOS 62 01/05/2021    BILITOT 0.3 01/05/2021     Lab Results   Component Value Date     01/05/2021    K 4.6 01/05/2021    K 3.8 01/02/2021     01/05/2021    CO2 24 01/05/2021    BUN 10 01/05/2021    CREATININE 0.6 01/05/2021    GFRAA >60 01/05/2021    LABGLOM >60 01/05/2021    GLUCOSE 125 01/05/2021    PROT 6.3 01/05/2021    LABALBU 3.8 01/05/2021    CALCIUM 9.8 01/05/2021    BILITOT 0.3 01/05/2021    ALKPHOS 62 01/05/2021    AST 19 01/05/2021    ALT 12 01/05/2021      SARS-COV-2 biomarkers    Recent Labs     01/03/21  1720 01/04/21  0435 01/05/21  0500 01/06/21  0918   PROCAL  --   --   --  0.05   DDIMER <200  --   --   --    AST 22 20 19  --    ALT 10 9 12  --      Lab Results   Component Value Date    CHOL 145 10/16/2020    TRIG 48 10/16/2020    HDL 56 10/16/2020    [address] 79 10/16/2020    LABVLDL 10 10/16/2020     Radiology:  Chest x-ray reviewed. Read as probable early pneumonia. It does not look much different compared to the one done earlier    Microbiology:   1/1/2021- blood cx- no growth to date  AFB culture- pending     ASSESSMENT:  · SARS-CoV-2 infection with pneumonia, improving.   Completed Remdesivir on 1/5/2021  · COPD · MAC infection of the lung, currently on 3 antimycobacterial agents since August 2019  · Leukopenia with lymphopenia associated to the above    PLAN:  · Continue Rifampin, Ethambutol, and Clarithromycin 3 times weekly  · Check repeat AFB sputum culture  · Start Cefepime  · Send respiratory culture Gram stain    Pennie Joseph  1/6/2021  2:38 PM

## 2021-01-06 NOTE — PLAN OF CARE
Problem: Breathing Pattern - Ineffective  Goal: Ability to achieve and maintain a regular respiratory rate  Outcome: Met This Shift     Problem:  Body Temperature -  Risk of, Imbalanced  Goal: Ability to maintain a body temperature within defined limits  Outcome: Met This Shift  Goal: Will regain or maintain usual level of consciousness  Outcome: Met This Shift     Problem: Risk for Fluid Volume Deficit  Goal: Maintain absence of muscle cramping  Outcome: Met This Shift

## 2021-01-07 LAB
GRAM STAIN ORDERABLE: NORMAL
L. PNEUMOPHILA SEROGP 1 UR AG: NORMAL
Lab: NORMAL
PROCALCITONIN: 0.05 NG/ML (ref 0–0.08)
REPORT: NORMAL
STREP PNEUMONIAE ANTIGEN, URINE: NORMAL
THIS TEST SENT TO: NORMAL

## 2021-01-07 PROCEDURE — 2700000000 HC OXYGEN THERAPY PER DAY

## 2021-01-07 PROCEDURE — 6360000002 HC RX W HCPCS: Performed by: INTERNAL MEDICINE

## 2021-01-07 PROCEDURE — 2580000003 HC RX 258: Performed by: INTERNAL MEDICINE

## 2021-01-07 PROCEDURE — 6370000000 HC RX 637 (ALT 250 FOR IP): Performed by: INTERNAL MEDICINE

## 2021-01-07 PROCEDURE — 84145 PROCALCITONIN (PCT): CPT

## 2021-01-07 PROCEDURE — 6360000002 HC RX W HCPCS: Performed by: SPECIALIST

## 2021-01-07 PROCEDURE — 36415 COLL VENOUS BLD VENIPUNCTURE: CPT

## 2021-01-07 PROCEDURE — 94761 N-INVAS EAR/PLS OXIMETRY MLT: CPT

## 2021-01-07 PROCEDURE — 2060000000 HC ICU INTERMEDIATE R&B

## 2021-01-07 PROCEDURE — 2580000003 HC RX 258: Performed by: SPECIALIST

## 2021-01-07 RX ADMIN — DEXAMETHASONE 6 MG: 4 TABLET ORAL at 10:03

## 2021-01-07 RX ADMIN — OXYCODONE HYDROCHLORIDE AND ACETAMINOPHEN 500 MG: 500 TABLET ORAL at 10:03

## 2021-01-07 RX ADMIN — ENOXAPARIN SODIUM 40 MG: 40 INJECTION SUBCUTANEOUS at 22:08

## 2021-01-07 RX ADMIN — Medication 10 ML: at 22:10

## 2021-01-07 RX ADMIN — ZINC SULFATE 220 MG (50 MG) CAPSULE 50 MG: CAPSULE at 10:03

## 2021-01-07 RX ADMIN — CEFEPIME 2 G: 2 INJECTION, POWDER, FOR SOLUTION INTRAMUSCULAR; INTRAVENOUS at 16:49

## 2021-01-07 RX ADMIN — SALINE NASAL SPRAY 1 SPRAY: 1.5 SOLUTION NASAL at 16:49

## 2021-01-07 RX ADMIN — BUDESONIDE AND FORMOTEROL FUMARATE DIHYDRATE 2 PUFF: 80; 4.5 AEROSOL RESPIRATORY (INHALATION) at 10:04

## 2021-01-07 RX ADMIN — Medication 2000 UNITS: at 10:03

## 2021-01-07 RX ADMIN — SALINE NASAL SPRAY 1 SPRAY: 1.5 SOLUTION NASAL at 10:04

## 2021-01-07 RX ADMIN — Medication 10 ML: at 10:03

## 2021-01-07 RX ADMIN — TIOTROPIUM BROMIDE 18 MCG: 18 CAPSULE ORAL; RESPIRATORY (INHALATION) at 10:04

## 2021-01-07 RX ADMIN — Medication 10.5 MG: at 22:09

## 2021-01-07 RX ADMIN — CEFEPIME 2 G: 2 INJECTION, POWDER, FOR SOLUTION INTRAMUSCULAR; INTRAVENOUS at 04:38

## 2021-01-07 RX ADMIN — FAMOTIDINE 20 MG: 20 TABLET, FILM COATED ORAL at 10:03

## 2021-01-07 ASSESSMENT — PAIN SCALES - GENERAL: PAINLEVEL_OUTOF10: 0

## 2021-01-07 NOTE — CARE COORDINATION
Social work / Discharge Planning:       [address]. Patient is down to 4 liters of oxygen and his baseline at home is 3 liters from [facility]. Discharge plan is home. Social work continues to follow.   Electronically signed by MALVIN Lowe on 1/7/2021 at 12:47 PM

## 2021-01-07 NOTE — PROGRESS NOTES
Subjective:  Feeling better   No CP or SOB  No fever or chills   No uncontrolled pain  No vomiting /diarrhea        Objective:    /63   Pulse 76   Temp 97.6 °F (36.4 °C) (Oral)   Resp 20   Ht 6' 1\" (1.854 m)   Wt 200 lb (90.7 kg)   SpO2 97%   BMI 26.39 kg/m²     24HR INTAKE/OUTPUT:      Intake/Output Summary (Last 24 hours) at 1/7/2021 1405  Last data filed at 1/7/2021 1009  Gross per 24 hour   Intake 524.21 ml   Output 150 ml   Net 374.21 ml       General appearance: NAD, conversant  Neck: FROM, supple   Lungs: Clear bilaterally no wheezes, no rhonchi, no crackles  CV: RRR, no MRGs; normal carotid upstroke and amplitude without Bruits  Abdomen: Soft, non-tender; no masses or HSM  Extremities: No edema, no cyanosis, no clubbing  Skin: Intact no rash, no lesions, no ulcers    Psych: Alert and oriented normal affect  Neuro: Nonfocal  Most Recent Labs  Lab Results   Component Value Date    WBC 5.7 01/05/2021    HGB 18.0 (H) 01/05/2021    HCT 55.4 (H) 01/05/2021     01/05/2021     01/05/2021    K 4.6 01/05/2021     01/05/2021    CREATININE 0.6 (L) 01/05/2021    BUN 10 01/05/2021    CO2 24 01/05/2021    GLUCOSE 125 (H) 01/05/2021    ALT 12 01/05/2021    AST 19 01/05/2021    INR 1.1 05/30/2019    TSH 1.290 10/16/2020    LABA1C 5.5 10/16/2020     No results for input(s): MG in the last 72 hours. Lab Results   Component Value Date    CALCIUM 9.8 01/05/2021    PHOS 2.4 (L) 10/17/2020        XR CHEST PORTABLE   Final Result   Advanced emphysematous changes in both lungs with chronic findings in the   lower lung bases. Additional increased density in the left lung base could represent an area of   a developing pneumonia. US DUP LOWER EXTREMITY LEFT MARINA   Final Result   No evidence of DVT in the left lower extremity. XR CHEST PORTABLE   Final Result   1. Patchy bilateral pneumonia   2. Advanced emphysematous changes with interstitial fibrosis.           Assessment Principal Problem:    SARS-associated coronavirus infection  Active Problems:    COPD exacerbation ([facility]    Pulmonary emphysema with fibrosis of lung ([facility]    Pneumonia due to infectious organism    SADE (mycobacterium avium-intracellulare) ([facility]    Acute hypoxemic respiratory failure (HCC)    Hypoxia  Resolved Problems:    * No resolved hospital problems. *      Plan:  79-year-old male history of Mycobacterium Avium admitted with COVID-19 and respiratory failure    Chest x-ray reviewed question of worsening left lower lobe infiltrate--possible pneumonic bacterial superinfection  Sputum culture with few gram-positive diphtheroids and few gram-positive coccal bacillary  Procalcitonin negative at 0.05, repeat tomorrow  Cefepime started  SARS-CoV-2 PCR +   Isolation--droplet plus  Supplemental O2 wean as tolerated 92% on 4 L  MDIs, no nebulizers  Avoid NIPPV  Remdesivir completed 5 days  Decadron 10 days  Melatonin, Pepcid, Zinc, vitamin C, vitamin D  Monitor inflammatory markers  Pulmonology consult appreciated  ID consult appreciated- cont Abx for SADE  Check C.  Difficile-- specimen rejected as too formed  Medications for other co morbidities cont as appropriate w dosage adjustments as necessary  PT/OT  DVT PPx  DC planning home w [facility] on home O2 when antimicrobial regimen finalized for discharge      Electronically signed by Francisco Javier Cunningham MD on 1/7/2021 at 2:05 PM

## 2021-01-07 NOTE — PROGRESS NOTES
[facility] [address] [facility]  [address] [facility], [address]  [facility], [address] [place] [address]      Pulmonary Progress Note      SUBJECTIVE:  Feeling better with breathing, slightly better with cough. On 4 li NC saturating about 90%, lying down in bed when seen.     OBJECTIVE    Medications    Continuous Infusions:    Scheduled Meds:   sodium chloride  1 spray Each Nostril BID    cefepime  2 g Intravenous Q12H    clarithromycin  500 mg Oral 2 times per day on     ethambutol  400 mg Oral 2 times per day on     rifAMPin  300 mg Oral 2 times per day on     melatonin  10.5 mg Oral Nightly    Vitamin D  2,000 Units Oral Daily    ascorbic acid  500 mg Oral Daily    zinc sulfate  50 mg Oral Daily    famotidine  20 mg Oral Daily    sodium chloride flush  10 mL Intravenous 2 times per day    enoxaparin  40 mg Subcutaneous Daily    tiotropium  18 mcg Inhalation Daily    And    budesonide-formoterol  2 puff Inhalation BID       PRN Meds:guaiFENesin-dextromethorphan, albuterol, sodium chloride flush, promethazine **OR** ondansetron, polyethylene glycol, acetaminophen **OR** acetaminophen, sodium chloride, oxyCODONE-acetaminophen **AND** oxyCODONE    Physical    VITALS:  /63   Pulse 76   Temp 97.6 °F (36.4 °C) (Oral)   Resp 20   Ht 6' 1\" (1.854 m)   Wt 200 lb (90.7 kg)   SpO2 97%   BMI 26.39 kg/m²     24HR INTAKE/OUTPUT:      Intake/Output Summary (Last 24 hours) at 2021 1416  Last data filed at 2021 1009  Gross per 24 hour   Intake 524.21 ml   Output 150 ml   Net 374.21 ml       24HR PULSE OXIMETRY RANGE:    SpO2  Av.8 %  Min: 88 %  Max: 97 %    General appearance: alert, appears stated age and cooperative  Lungs: rhonchi bibasilar  Heart: regular rate and rhythm, S1, S2 normal, no murmur, click, rub or gallop  Abdomen: soft, non-tender; bowel sounds normal; no masses,  no organomegaly Extremities: extremities normal, atraumatic, no cyanosis or edema  Neurologic: Mental status: Alert, oriented, thought content appropriate    Data    CBC:   Recent Labs     01/05/21  0500   WBC 5.7   HGB 18.0*   HCT 55.4*   MCV 89.4          BMP:  Recent Labs     01/05/21  0500      K 4.6      CO2 24   BUN 10   CREATININE 0.6*    ALB:3,BILIDIR:3,BILITOT:3,ALKPHOS:3)@    PT/INR: No results for input(s): PROTIME, INR in the last 72 hours. ABG:   No results for input(s): PH, PO2, PCO2, HCO3, BE, O2SAT, METHB, O2HB, COHB, O2CON, HHB, THB in the last 72 hours. Radiology/Other tests reviewed: CXR reviewed looking similar to previous    Assessment:     Principal Problem:    SARS-associated coronavirus infection  Active Problems:    COPD exacerbation ([facility] [address]    Pulmonary emphysema with fibrosis of lung ([facility] [address]    Pneumonia due to infectious organism    SADE (mycobacterium avium-intracellulare) ([facility] [address]    Acute hypoxemic respiratory failure ([facility] [address]    Hypoxia  Resolved Problems:    * No resolved hospital problems. *      Plan:       1. Cont with oxygen, taper as tolerated, prone positioning when able to, will need new oxygen order due to current requirements  2. Cont with steroids, taper as tolerated, will give for another few days  3. Antibiotics as per ID  4. Cont with MDI, observe respiratory function and cough/sputum production  5. Incentive spirometer and observe respiratory function and cough  6. Nasal saline bid and see if helps with congestion  7. OOB to chair, PT/OT if needed      Time at the bedside, reviewing labs and radiographs, reviewing notes and consultations, discussing with staff and family was more than 35 minutes. Thanks for letting us see this patient in consultation. Please contact us with any questions. Office [phone] or after hours through [employer], x [phone].

## 2021-01-07 NOTE — PROGRESS NOTES
[address] [place] [address] [facility]  DAT  Progress Note  CC: shortness of breath  Face to face encounter  SUBJECTIVE:  The patient is feeling better. Productive sputum is improving. Review of systems: As above, otherwise unremarkable. Medications:  Scheduled Meds:   [START ON 1/8/2021] dexamethasone  6 mg Oral Daily    sodium chloride  1 spray Each Nostril BID    cefepime  2 g Intravenous Q12H    clarithromycin  500 mg Oral 2 times per day on Mon Wed Fri    ethambutol  400 mg Oral 2 times per day on Mon Wed Fri    rifAMPin  300 mg Oral 2 times per day on Mon Wed Fri    melatonin  10.5 mg Oral Nightly    Vitamin D  2,000 Units Oral Daily    ascorbic acid  500 mg Oral Daily    zinc sulfate  50 mg Oral Daily    famotidine  20 mg Oral Daily    sodium chloride flush  10 mL Intravenous 2 times per day    enoxaparin  40 mg Subcutaneous Daily    tiotropium  18 mcg Inhalation Daily    And    budesonide-formoterol  2 puff Inhalation BID     Continuous Infusions:  PRN Meds:guaiFENesin-dextromethorphan, albuterol, sodium chloride flush, promethazine **OR** ondansetron, polyethylene glycol, acetaminophen **OR** acetaminophen, sodium chloride, oxyCODONE-acetaminophen **AND** oxyCODONE  OBJECTIVE:  Patient Vitals for the past 24 hrs:   BP Temp Temp src Pulse Resp SpO2   01/07/21 1000 109/63 97.6 °F (36.4 °C) Oral 76 20 97 %   01/07/21 0855      92 %   01/07/21 0430 86/65 97.7 °F (36.5 °C) Oral 74 22 92 %   01/07/21 0200    70  91 %   01/07/21 0045      (!) 88 %   01/06/21 2045 100/66 97.7 °F (36.5 °C) Oral 75 20 91 %     Constitutional: The patient is sitting up in bed. He is awake and alert. Down to 4 L nasal cannula. Skin: Warm and dry. No rashes were noted. Head: Eyes show round, and reactive pupils. No jaundice. Mouth: Moist mucous membranes, no ulcerations, no thrush. Neck: Supple to movements. No lymphadenopathy. Chest: Good breath sounds. Crackles on left base posteriorly. Cardiovascular: Heart sounds rhythmic and regular. Abdomen: Positive bowel sounds to auscultation. Benign to palpation. Extremities: No edema. PIV    Laboratory and Tests Review:  Lab Results   Component Value Date    WBC 5.7 01/05/2021    WBC 5.3 01/04/2021    WBC 3.3 (L) 01/03/2021    HGB 18.0 (H) 01/05/2021    HCT 55.4 (H) 01/05/2021    MCV 89.4 01/05/2021     01/05/2021     Lab Results   Component Value Date    NEUTROABS 1.93 01/02/2021    NEUTROABS 3.31 01/01/2021    NEUTROABS 5.29 10/17/2020     Lab Results   Component Value Date    CRP 0.7 (H) 01/02/2021    CRP 4.1 (H) 10/17/2020    CRP 1.8 (H) 10/16/2020     Lab Results   Component Value Date    SEDRATE 0 01/02/2021    SEDRATE 1 10/17/2020    SEDRATE 0 10/16/2020     Lab Results   Component Value Date    ALT 12 01/05/2021    AST 19 01/05/2021    ALKPHOS 62 01/05/2021    BILITOT 0.3 01/05/2021     Lab Results   Component Value Date     01/05/2021    K 4.6 01/05/2021    K 3.8 01/02/2021     01/05/2021    CO2 24 01/05/2021    BUN 10 01/05/2021    CREATININE 0.6 01/05/2021    GFRAA >60 01/05/2021    LABGLOM >60 01/05/2021    GLUCOSE 125 01/05/2021    PROT 6.3 01/05/2021    LABALBU 3.8 01/05/2021    CALCIUM 9.8 01/05/2021    BILITOT 0.3 01/05/2021    ALKPHOS 62 01/05/2021    AST 19 01/05/2021    ALT 12 01/05/2021      SARS-COV-2 biomarkers    Recent Labs     01/05/21  0500 01/06/21  0918   PROCAL  --  0.05   AST 19  --    ALT 12  --      Lab Results   Component Value Date    CHOL 145 10/16/2020    TRIG 48 10/16/2020    HDL 56 10/16/2020    [facility] 79 10/16/2020    LABVLDL 10 10/16/2020     Radiology:  Chest x-ray reviewed. Read as probable early pneumonia.   It does not look much different compared to the one done earlier    Microbiology:   1/1/2021- blood cx- no growth to date  AFB culture- pending     ASSESSMENT: · SARS-CoV-2 infection with pneumonia, improving.   Completed Remdesivir on 1/5/2021  · COPD  · MAC infection of the lung, currently on 3 antimycobacterial agents since August 2019  · Leukopenia with lymphopenia associated to the above  · Possible superimposed bacterial pneumonia    PLAN:  · Continue Rifampin, Ethambutol, and Clarithromycin 3 times weekly  · Check repeat AFB sputum culture  · Continue Cefepime  · Tomorrow we will most likely change this to an oral antibiotic and let him go home    Eve Ledbetter  1/7/2021  2:44 PM

## 2021-01-08 VITALS
WEIGHT: 200 LBS | HEIGHT: 73 IN | SYSTOLIC BLOOD PRESSURE: 101 MMHG | HEART RATE: 87 BPM | DIASTOLIC BLOOD PRESSURE: 63 MMHG | OXYGEN SATURATION: 90 % | TEMPERATURE: 97.8 F | RESPIRATION RATE: 24 BRPM | BODY MASS INDEX: 26.51 KG/M2

## 2021-01-08 PROCEDURE — 2580000003 HC RX 258: Performed by: INTERNAL MEDICINE

## 2021-01-08 PROCEDURE — 6370000000 HC RX 637 (ALT 250 FOR IP): Performed by: INTERNAL MEDICINE

## 2021-01-08 PROCEDURE — 6360000002 HC RX W HCPCS: Performed by: INTERNAL MEDICINE

## 2021-01-08 PROCEDURE — 2700000000 HC OXYGEN THERAPY PER DAY

## 2021-01-08 PROCEDURE — 6360000002 HC RX W HCPCS: Performed by: SPECIALIST

## 2021-01-08 PROCEDURE — 2580000003 HC RX 258: Performed by: SPECIALIST

## 2021-01-08 PROCEDURE — 94761 N-INVAS EAR/PLS OXIMETRY MLT: CPT

## 2021-01-08 RX ORDER — DEXAMETHASONE 6 MG/1
6 TABLET ORAL
Qty: 4 TABLET | Refills: 0 | Status: SHIPPED | OUTPATIENT
Start: 2021-01-08 | End: 2021-01-12

## 2021-01-08 RX ORDER — CEFUROXIME AXETIL 500 MG/1
500 TABLET ORAL EVERY 12 HOURS SCHEDULED
Status: DISCONTINUED | OUTPATIENT
Start: 2021-01-08 | End: 2021-01-08 | Stop reason: HOSPADM

## 2021-01-08 RX ORDER — CEFUROXIME AXETIL 500 MG/1
500 TABLET ORAL EVERY 12 HOURS SCHEDULED
Qty: 10 TABLET | Refills: 0 | Status: SHIPPED | OUTPATIENT
Start: 2021-01-08 | End: 2021-01-13

## 2021-01-08 RX ADMIN — OXYCODONE HYDROCHLORIDE AND ACETAMINOPHEN 1 TABLET: 5; 325 TABLET ORAL at 14:40

## 2021-01-08 RX ADMIN — Medication 2000 UNITS: at 09:04

## 2021-01-08 RX ADMIN — BUDESONIDE AND FORMOTEROL FUMARATE DIHYDRATE 2 PUFF: 80; 4.5 AEROSOL RESPIRATORY (INHALATION) at 09:05

## 2021-01-08 RX ADMIN — TIOTROPIUM BROMIDE 18 MCG: 18 CAPSULE ORAL; RESPIRATORY (INHALATION) at 09:05

## 2021-01-08 RX ADMIN — CEFEPIME 2 G: 2 INJECTION, POWDER, FOR SOLUTION INTRAMUSCULAR; INTRAVENOUS at 04:12

## 2021-01-08 RX ADMIN — SALINE NASAL SPRAY 1 SPRAY: 1.5 SOLUTION NASAL at 09:05

## 2021-01-08 RX ADMIN — CLARITHROMYCIN 500 MG: 500 TABLET ORAL at 09:04

## 2021-01-08 RX ADMIN — ACETAMINOPHEN 650 MG: 325 TABLET ORAL at 14:03

## 2021-01-08 RX ADMIN — RIFAMPIN 300 MG: 300 CAPSULE ORAL at 09:04

## 2021-01-08 RX ADMIN — ZINC SULFATE 220 MG (50 MG) CAPSULE 50 MG: CAPSULE at 09:04

## 2021-01-08 RX ADMIN — CLARITHROMYCIN 500 MG: 500 TABLET ORAL at 16:48

## 2021-01-08 RX ADMIN — RIFAMPIN 300 MG: 300 CAPSULE ORAL at 16:48

## 2021-01-08 RX ADMIN — ETHAMBUTOL HYDROCHLORIDE 400 MG: 400 TABLET ORAL at 16:48

## 2021-01-08 RX ADMIN — OXYCODONE HYDROCHLORIDE AND ACETAMINOPHEN 500 MG: 500 TABLET ORAL at 09:04

## 2021-01-08 RX ADMIN — SALINE NASAL SPRAY 1 SPRAY: 1.5 SOLUTION NASAL at 16:07

## 2021-01-08 RX ADMIN — ETHAMBUTOL HYDROCHLORIDE 400 MG: 400 TABLET ORAL at 09:04

## 2021-01-08 RX ADMIN — OXYCODONE HYDROCHLORIDE 2.5 MG: 5 TABLET ORAL at 14:40

## 2021-01-08 RX ADMIN — Medication 10 ML: at 09:04

## 2021-01-08 RX ADMIN — FAMOTIDINE 20 MG: 20 TABLET, FILM COATED ORAL at 09:04

## 2021-01-08 RX ADMIN — DEXAMETHASONE 6 MG: 4 TABLET ORAL at 09:04

## 2021-01-08 ASSESSMENT — PAIN SCALES - GENERAL
PAINLEVEL_OUTOF10: 8
PAINLEVEL_OUTOF10: 6

## 2021-01-08 ASSESSMENT — PAIN DESCRIPTION - PAIN TYPE: TYPE: ACUTE PAIN

## 2021-01-08 ASSESSMENT — PAIN DESCRIPTION - LOCATION: LOCATION: HEAD

## 2021-01-08 NOTE — DISCHARGE SUMMARY
Physician Discharge Summary     Patient ID:  Pham Cantu  [id number]  58 y.o.  1950    Admit date: 1/1/2021    Discharge date and time:  1/8/2021    Discharge Diagnoses: Principal Problem:    SARS-associated coronavirus infection  Active Problems:    COPD exacerbation ([facility] [address]    Pulmonary emphysema with fibrosis of lung ([facility] [address]    Pneumonia due to infectious organism    SADE (mycobacterium avium-intracellulare) ([facility] [address]    Acute hypoxemic respiratory failure ([facility] [address]    Hypoxia  Resolved Problems:    * No resolved hospital problems. *      Consults: IP CONSULT TO INTERNAL MEDICINE  IP CONSULT TO PHARMACY  IP CONSULT TO INFECTIOUS DISEASES  IP CONSULT TO PULMONOLOGY    Procedures: See below    Hospital Course: 73-year-old male history of Mycobacterium Avium admitted with COVID-19 and respiratory failure    Chest x-ray reviewed question of worsening left lower lobe infiltrate--possible pneumonic bacterial superinfection  Sputum culture with few gram-positive diphtheroids and few gram-positive coccal bacillary  Procalcitonin negative at 0.05, repeat tomorrow  Cefepime started  SARS-CoV-2 PCR +   Isolation--droplet plus  Supplemental O2 wean as tolerated 92% on 4 L  MDIs, no nebulizers  Avoid NIPPV  Remdesivir completed 5 days  Decadron 10 days  Melatonin, Pepcid, Zinc, vitamin C, vitamin D  Monitor inflammatory markers  Pulmonology consult appreciated  ID consult appreciated- cont Abx for SADE  Check C.  Difficile-- specimen rejected as too formed  Medications for other co morbidities cont as appropriate w dosage adjustments as necessary  PT/OT  DVT PPx  DC planning home w [facility] on home O2 when antimicrobial regimen finalized for discharge  Complete Decadron and cefuroxime as outpatient      Discharge Exam:  See progress note from today    Condition:  Stable    Disposition: home    Patient Instructions:   Current Discharge Medication List      START taking these medications    Details cefUROXime (CEFTIN) 500 MG tablet Take 1 tablet by mouth every 12 hours for 5 days  Qty: 10 tablet, Refills: 0      dexamethasone (DECADRON) 6 MG tablet Take 1 tablet by mouth daily (with breakfast) for 4 days  Qty: 4 tablet, Refills: 0    Comments: 4 days-disregard previous      melatonin 3 MG TABS tablet Take 3 tablets by mouth nightly  Qty: 50 tablet, Refills: 0      zinc sulfate (ZINCATE) 220 (50 Zn) MG capsule Take 1 capsule by mouth daily  Qty: 20 capsule, Refills: 3      famotidine (PEPCID) 20 MG tablet Take 1 tablet by mouth daily  Qty: 30 tablet, Refills: 0         CONTINUE these medications which have NOT CHANGED    Details   clarithromycin (BIAXIN) 500 MG tablet Take 1,000 mg by mouth three times a week Every M-W-F      ethambutol (MYAMBUTOL) 400 MG tablet Take 800 mg by mouth three times a week Every M-W-F      rifAMPin (RIFADIN) 300 MG capsule Take 600 mg by mouth three times a week Every M-W-F      oxyCODONE-acetaminophen (PERCOCET) 7.5-325 MG per tablet TK 1 T PO BID PRN P      Fluticasone-Umeclidin-Vilant (TRELEGY ELLIPTA) 100-62.5-25 MCG/INH AEPB Inhale 1 puff into the lungs daily  Qty: 2 each, Refills: 0      albuterol sulfate  (90 Base) MCG/ACT inhaler Inhale 2 puffs into the lungs every 6 hours as needed for Wheezing or Shortness of Breath      OXYGEN Inhale 3.5 L into the lungs continuous prn       etodolac (LODINE) 500 MG tablet Take 500 mg by mouth 2 times daily          STOP taking these medications       pregabalin (LYRICA) 100 MG capsule Comments:   Reason for Stopping:             Activity: activity as tolerated  Diet: regular diet    Follow-up with 1wk PCP  2wks PULM and ID    Note that over 30 minutes was spent in preparing discharge papers, discussing discharge with patient, staff, consultants, medication review, arranging follow up, etc.    Signed:  Wei Romero MD  1/8/2021  12:06 PM

## 2021-01-08 NOTE — PROGRESS NOTES
Subjective:  Feeling better no complaints ready to go home  No CP or SOB  No fever or chills   No uncontrolled pain  No vomiting /diarrhea        Objective:    /64   Pulse 92   Temp 98.2 °F (36.8 °C) (Oral)   Resp 22   Ht 6' 1\" (1.854 m)   Wt 200 lb (90.7 kg)   SpO2 90%   BMI 26.39 kg/m²     24HR INTAKE/OUTPUT:    No intake or output data in the 24 hours ending 01/08/21 1205    General appearance: NAD, conversant  Neck: FROM, supple   Lungs: Clear bilaterally no wheezes, no rhonchi, no crackles  CV: RRR, no MRGs; normal carotid upstroke and amplitude without Bruits  Abdomen: Soft, non-tender; no masses or HSM  Extremities: No edema, no cyanosis, no clubbing  Skin: Intact no rash, no lesions, no ulcers    Psych: Alert and oriented normal affect  Neuro: Nonfocal  Most Recent Labs  Lab Results   Component Value Date    WBC 5.7 01/05/2021    HGB 18.0 (H) 01/05/2021    HCT 55.4 (H) 01/05/2021     01/05/2021     01/05/2021    K 4.6 01/05/2021     01/05/2021    CREATININE 0.6 (L) 01/05/2021    BUN 10 01/05/2021    CO2 24 01/05/2021    GLUCOSE 125 (H) 01/05/2021    ALT 12 01/05/2021    AST 19 01/05/2021    INR 1.1 05/30/2019    TSH 1.290 10/16/2020    LABA1C 5.5 10/16/2020     No results for input(s): MG in the last 72 hours. Lab Results   Component Value Date    CALCIUM 9.8 01/05/2021    PHOS 2.4 (L) 10/17/2020        XR CHEST PORTABLE   Final Result   Advanced emphysematous changes in both lungs with chronic findings in the   lower lung bases. Additional increased density in the left lung base could represent an area of   a developing pneumonia. US DUP LOWER EXTREMITY LEFT MARINA   Final Result   No evidence of DVT in the left lower extremity. XR CHEST PORTABLE   Final Result   1. Patchy bilateral pneumonia   2. Advanced emphysematous changes with interstitial fibrosis.           Assessment    Principal Problem:    SARS-associated coronavirus infection  Active Problems: COPD exacerbation ([facility] [address]    Pulmonary emphysema with fibrosis of lung ([facility] [address]    Pneumonia due to infectious organism    SADE (mycobacterium avium-intracellulare) ([facility] [address]    Acute hypoxemic respiratory failure ([facility])    Hypoxia  Resolved Problems:    * No resolved hospital problems. *      Plan:  62-year-old male history of Mycobacterium Avium admitted with COVID-19 and respiratory failure    Chest x-ray reviewed question of worsening left lower lobe infiltrate--possible pneumonic bacterial superinfection  Sputum culture with few gram-positive diphtheroids and few gram-positive coccal bacillary  Procalcitonin negative at 0.05, repeat tomorrow  Cefepime started  SARS-CoV-2 PCR +   Isolation--droplet plus  Supplemental O2 wean as tolerated 92% on 6 L  MDIs, no nebulizers  Avoid NIPPV  Remdesivir completed 5 days  Decadron 10 days  Melatonin, Pepcid, Zinc, vitamin C, vitamin D  Monitor inflammatory markers  Pulmonology consult appreciated  ID consult appreciated- cont Abx for SADE  Check C.  Difficile-- specimen rejected as too formed  Medications for other co morbidities cont as appropriate w dosage adjustments as necessary  PT/OT  DVT PPx  DC planning home w [facility] on home O2 when antimicrobial regimen finalized for discharge  Complete Decadron and cefuroxime as outpatient    Electronically signed by Ajith Javed MD on 1/8/2021 at 12:05 PM

## 2021-01-08 NOTE — PROGRESS NOTES
Call placed to Dr Gerri Mast regarding oxygen needs and discharge. Waiting for return call. 1/8/2021 5468 Spoke with Dr Gerri Mast. Ok to discharge on 6l NC.

## 2021-01-08 NOTE — PROGRESS NOTES
Patient ambulated this morning. At rest SPO2 90% on 6Ls. Upon ambulation SPO2 85% on 6Ls. I increased O2 to 10Ls high-flow and pt did not recover from 86%. Pt did recover to 90% on 6Ls at rest. Dr. Landon Monsivais updated and he states that as long as pt's SPO2 stays > 85% then patient is OK on 6Ls.

## 2021-01-08 NOTE — PROGRESS NOTES
[facility] Quality Flow/Interdisciplinary Rounds Progress Note        Quality Flow Rounds held on January 8, 2021    Disciplines Attending:  Bedside Nurse, ,  and Nursing Unit Leadership    Jazlyn Westbrook was admitted on 1/1/2021  8:48 AM    Anticipated Discharge Date:       Disposition:    Sandro Score:  Sandro Scale Score: 20    Readmission Risk              Risk of Unplanned Readmission:        10           Discussed patient goal for the day, patient clinical progression, and barriers to discharge. The following Goal(s) of the Day/Commitment(s) have been identified: maintain oxygenation, remdesivir finished, continue IV decadron BID, continue Cefapime IV BID.     Rozell Eisenmenger  January 8, 2021

## 2021-01-08 NOTE — PROGRESS NOTES
Nutrition Assessment     Type and Reason for Visit: Initial, RD Nutrition Re-Screen/LOS(RD Re-Screen Negative)    Nutrition Assessment:  Pt assessed per LOS protocol. Chart reviewed. Pt currently eating ~75% of most meals and w/ no other significant nutritional issues noted at this time. Will follow-up per policy. Please consult if RD needed.     Electronically signed by Carolin Jones RD, BR[place]DA on 1/8/21 at 12:36 PM EST    Contact: ext [phone]

## 2021-01-08 NOTE — CARE COORDINATION
CM NOTE: CM received request to contact [facility] with new O2 orders. CM spoke with Crystal from [facility]---updated pt discharging home today on O2 6L but only uses 3L at home. Per Crystal---delivering portable tank to unit for transport home. SW & Nurse updated.

## 2021-01-08 NOTE — PROGRESS NOTES
Pulse ox was ____82__% on room air at rest.  Ambulated patient on room air. Oxygen saturation was ____86__% on room air while ambulating. Oxygen applied. Recovery pulse ox was _____90_% on ____6___ liters of oxygen while ambulating.

## 2021-01-08 NOTE — PLAN OF CARE
Problem: Airway Clearance - Ineffective  Goal: Achieve or maintain patent airway  Outcome: Completed     Problem: Gas Exchange - Impaired  Goal: Absence of hypoxia  Outcome: Completed  Goal: Promote optimal lung function  Outcome: Completed     Problem: Breathing Pattern - Ineffective  Goal: Ability to achieve and maintain a regular respiratory rate  Outcome: Completed     Problem:  Body Temperature -  Risk of, Imbalanced  Goal: Ability to maintain a body temperature within defined limits  Outcome: Completed  Goal: Will regain or maintain usual level of consciousness  Outcome: Completed  Goal: Complications related to the disease process, condition or treatment will be avoided or minimized  Outcome: Completed     Problem: Isolation Precautions - Risk of Spread of Infection  Goal: Prevent transmission of infection  Outcome: Completed     Problem: Nutrition Deficits  Goal: Optimize nutrtional status  Outcome: Completed     Problem: Risk for Fluid Volume Deficit  Goal: Maintain normal heart rhythm  Outcome: Completed  Goal: Maintain absence of muscle cramping  Outcome: Completed  Goal: Maintain normal serum potassium, sodium, calcium, phosphorus, and pH  Outcome: Completed     Problem: Loneliness or Risk for Loneliness  Goal: Demonstrate positive use of time alone when socialization is not possible  Outcome: Completed     Problem: Fatigue  Goal: Verbalize increase energy and improved vitality  Outcome: Completed     Problem: Patient Education: Go to Patient Education Activity  Goal: Patient/Family Education  Outcome: Completed     Problem: Falls - Risk of:  Goal: Will remain free from falls  Description: Will remain free from falls  Outcome: Completed  Goal: Absence of physical injury  Description: Absence of physical injury  Outcome: Completed     Problem: Pain:  Goal: Pain level will decrease  Description: Pain level will decrease  Outcome: Completed  Goal: Control of acute pain  Description: Control of acute pain Outcome: Completed  Goal: Control of chronic pain  Description: Control of chronic pain  Outcome: Completed     Problem: Pain:  Goal: Control of chronic pain  Description: Control of chronic pain  Outcome: Completed

## 2021-01-08 NOTE — PROGRESS NOTES
[address] [place] [address] [facility]  DAT  Progress Note  CC: shortness of breath  Face to face encounter  SUBJECTIVE:  The patient continues to improve slowly. Tolerating oral antibiotics. Review of systems: As above, otherwise unremarkable. Medications:  Scheduled Meds:   cefUROXime  500 mg Oral 2 times per day    dexamethasone  6 mg Oral Daily    sodium chloride  1 spray Each Nostril BID    clarithromycin  500 mg Oral 2 times per day on Mon Wed Fri    ethambutol  400 mg Oral 2 times per day on Mon Wed Fri    rifAMPin  300 mg Oral 2 times per day on Mon Wed Fri    melatonin  10.5 mg Oral Nightly    Vitamin D  2,000 Units Oral Daily    ascorbic acid  500 mg Oral Daily    zinc sulfate  50 mg Oral Daily    famotidine  20 mg Oral Daily    sodium chloride flush  10 mL Intravenous 2 times per day    enoxaparin  40 mg Subcutaneous Daily    tiotropium  18 mcg Inhalation Daily    And    budesonide-formoterol  2 puff Inhalation BID     Continuous Infusions:  PRN Meds:guaiFENesin-dextromethorphan, albuterol, sodium chloride flush, promethazine **OR** ondansetron, polyethylene glycol, acetaminophen **OR** acetaminophen, sodium chloride, oxyCODONE-acetaminophen **AND** oxyCODONE  OBJECTIVE:  Patient Vitals for the past 24 hrs:   BP Temp Temp src Pulse Resp SpO2   01/08/21 0900 118/64 98.2 °F (36.8 °C) Oral 92 22 90 %   01/08/21 0415      92 %   01/07/21 2210 (!) 91/57 98.3 °F (36.8 °C) Oral 70 20    01/07/21 2030    73  90 %   01/07/21 1745      91 %   01/07/21 1616 106/66 97.7 °F (36.5 °C) Oral 74 18 (!) 89 %     Constitutional: The patient is sitting up in bed. He is awake and alert. 6 L nasal cannula. Skin: Warm and dry. No rashes were noted. Head: Eyes show round, and reactive pupils. No jaundice. Mouth: Moist mucous membranes, no ulcerations, no thrush. Neck: Supple to movements. No lymphadenopathy. Chest: Good breath sounds. Crackles on left base posteriorly. Cardiovascular: Heart sounds rhythmic and regular. Abdomen: Positive bowel sounds to auscultation. Benign to palpation. Extremities: No edema. PIV    Laboratory and Tests Review:  Lab Results   Component Value Date    WBC 5.7 01/05/2021    WBC 5.3 01/04/2021    WBC 3.3 (L) 01/03/2021    HGB 18.0 (H) 01/05/2021    HCT 55.4 (H) 01/05/2021    MCV 89.4 01/05/2021     01/05/2021     Lab Results   Component Value Date    NEUTROABS 1.93 01/02/2021    NEUTROABS 3.31 01/01/2021    NEUTROABS 5.29 10/17/2020     Lab Results   Component Value Date    CRP 0.7 (H) 01/02/2021    CRP 4.1 (H) 10/17/2020    CRP 1.8 (H) 10/16/2020     Lab Results   Component Value Date    SEDRATE 0 01/02/2021    SEDRATE 1 10/17/2020    SEDRATE 0 10/16/2020     Lab Results   Component Value Date    ALT 12 01/05/2021    AST 19 01/05/2021    ALKPHOS 62 01/05/2021    BILITOT 0.3 01/05/2021     Lab Results   Component Value Date     01/05/2021    K 4.6 01/05/2021    K 3.8 01/02/2021     01/05/2021    CO2 24 01/05/2021    BUN 10 01/05/2021    CREATININE 0.6 01/05/2021    GFRAA >60 01/05/2021    LABGLOM >60 01/05/2021    GLUCOSE 125 01/05/2021    PROT 6.3 01/05/2021    LABALBU 3.8 01/05/2021    CALCIUM 9.8 01/05/2021    BILITOT 0.3 01/05/2021    ALKPHOS 62 01/05/2021    AST 19 01/05/2021    ALT 12 01/05/2021      SARS-COV-2 biomarkers    Recent Labs     01/06/21  0918 01/07/21  1620   PROCAL 0.05 0.05     Lab Results   Component Value Date    CHOL 145 10/16/2020    TRIG 48 10/16/2020    HDL 56 10/16/2020    [facility] 79 10/16/2020    LABVLDL 10 10/16/2020     Radiology:  Chest x-ray reviewed. Read as probable early pneumonia. It does not look much different compared to the one done earlier    Microbiology:   1/1/2021- blood cx- no growth to date  AFB culture- pending     ASSESSMENT:  · SARS-CoV-2 infection with pneumonia, improving.   Completed Remdesivir on 1/5/2021  · COPD · MAC infection of the lung, currently on 3 antimycobacterial agents since August 2019  · Leukopenia with lymphopenia associated to the above  · Possible superimposed bacterial pneumonia    PLAN:  · Continue Rifampin, Ethambutol, and Clarithromycin 3 times weekly  · Check repeat AFB sputum culture  · Change cefepime to Cefuroxime x5 days  · The patient can be discharged from ID standpoint.   Encouraged to call the office to make a follow-up appointment in 4 weeks    Discussed with Dr. Argelia Camacho  1/8/2021  1:20 PM

## 2021-01-08 NOTE — PROGRESS NOTES
[facility] [address] [facility]  [address], [address]  [facility], [address] [place] [address]      Pulmonary Progress Note      SUBJECTIVE:  Feeling stable with breathing, slightly better with cough. On 6 li NC saturating about 90% but mention of desaturation with minimal activity to 85%, lying down in bed when seen, didn't think got too bad with respiratory function when ambulated.     OBJECTIVE    Medications    Continuous Infusions:    Scheduled Meds:   dexamethasone  6 mg Oral Daily    sodium chloride  1 spray Each Nostril BID    cefepime  2 g Intravenous Q12H    clarithromycin  500 mg Oral 2 times per day on     ethambutol  400 mg Oral 2 times per day on     rifAMPin  300 mg Oral 2 times per day on     melatonin  10.5 mg Oral Nightly    Vitamin D  2,000 Units Oral Daily    ascorbic acid  500 mg Oral Daily    zinc sulfate  50 mg Oral Daily    famotidine  20 mg Oral Daily    sodium chloride flush  10 mL Intravenous 2 times per day    enoxaparin  40 mg Subcutaneous Daily    tiotropium  18 mcg Inhalation Daily    And    budesonide-formoterol  2 puff Inhalation BID       PRN Meds:guaiFENesin-dextromethorphan, albuterol, sodium chloride flush, promethazine **OR** ondansetron, polyethylene glycol, acetaminophen **OR** acetaminophen, sodium chloride, oxyCODONE-acetaminophen **AND** oxyCODONE    Physical    VITALS:  /64   Pulse 92   Temp 98.2 °F (36.8 °C) (Oral)   Resp 22   Ht 6' 1\" (1.854 m)   Wt 200 lb (90.7 kg)   SpO2 90%   BMI 26.39 kg/m²     24HR INTAKE/OUTPUT:    No intake or output data in the 24 hours ending 21 1028    24HR PULSE OXIMETRY RANGE:    SpO2  Av.4 %  Min: 89 %  Max: 92 %    General appearance: alert, appears stated age and cooperative  Lungs: rhonchi bibasilar  Heart: regular rate and rhythm, S1, S2 normal, no murmur, click, rub or gallop Abdomen: soft, non-tender; bowel sounds normal; no masses,  no organomegaly  Extremities: extremities normal, atraumatic, no cyanosis or edema  Neurologic: Mental status: Alert, oriented, thought content appropriate    Data    CBC:   No results for input(s): WBC, HGB, HCT, MCV, PLT in the last 72 hours. BMP:  No results for input(s): NA, K, CL, CO2, PHOS, BUN, CREATININE in the last 72 hours. Invalid input(s): CA ALB:3,BILIDIR:3,BILITOT:3,ALKPHOS:3)@    PT/INR: No results for input(s): PROTIME, INR in the last 72 hours. ABG:   No results for input(s): PH, PO2, PCO2, HCO3, BE, O2SAT, METHB, O2HB, COHB, O2CON, HHB, THB in the last 72 hours. Radiology/Other tests reviewed: CXR reviewed looking similar to previous    Assessment:     Principal Problem:    SARS-associated coronavirus infection  Active Problems:    COPD exacerbation ([facility] [address]    Pulmonary emphysema with fibrosis of lung ([facility] [address]    Pneumonia due to infectious organism    SADE (mycobacterium avium-intracellulare) ([facility] [address]    Acute hypoxemic respiratory failure ([facility] [address]    Hypoxia  Resolved Problems:    * No resolved hospital problems. *      Plan:       1. Cont with oxygen, taper as tolerated, prone positioning when able to, will need new oxygen order due to current requirements  2. Cont with steroids, taper as tolerated, will give for another few days  3. Antibiotics as per ID  4. Cont with MDI, observe respiratory function and cough/sputum production  5. Incentive spirometer and observe respiratory function and cough  6. Nasal saline bid, claims helping with use  7. Marginal with oxygenation with ambulation, unclear if he will be able to remain stable at home, certainly can keep at 6 li at rest and ambulation and ambulate slowly and rest often. Can consider discharge home but will have to be careful as marginal with activity.   8. OOB to chair, PT/OT (?) NF if too weak and not able to care well for himself Time at the bedside, reviewing labs and radiographs, reviewing notes and consultations, discussing with staff and family was more than 35 minutes. Thanks for letting us see this patient in consultation. Please contact us with any questions. Office [phone] or after hours through [employer], x [phone].

## 2021-01-08 NOTE — PROGRESS NOTES
Pt alert and oriented x4. He denies pain at this time and c/o SOB upon exertion but this is his baseline. Discharge instructions were explained to pt and a copy was given, he voices understanding. All needs are met at this time.  Safety measures are in place

## 2021-01-09 ENCOUNTER — CARE COORDINATION (OUTPATIENT)
Dept: CASE MANAGEMENT | Age: 71
End: 2021-01-09

## 2021-01-09 LAB
CULTURE, RESPIRATORY: ABNORMAL
CULTURE, RESPIRATORY: ABNORMAL
ORGANISM: ABNORMAL
SMEAR, RESPIRATORY: ABNORMAL

## 2021-01-09 NOTE — CARE COORDINATION
CC [facility] PULM CC   12/16/2021  1:30 PM Arjun Manley MD TRAIL PC [facility]     Non-[facility] follow up appointment(s): patient to call ID and pulmonary for appointments    Non-face-to-face services provided:  Obtained and reviewed discharge summary and/or continuity of care documents     Advance Care Planning:   Does patient have an Advance Directive:  decision maker updated. Patient has following risk factors of: COPD, pneumonia, sepsis and acute respiratory failure. CTN/ACM reviewed discharge instructions, medical action plan and red flags such as increased shortness of breath, increasing fever and signs of decompensation with patient who verbalized understanding. Discussed exposure protocols and quarantine with CDC Guidelines What to do if you are sick with coronavirus disease 2019.  Patient was given an opportunity for questions and concerns. The patient agrees to contact the [employer] exposure line [phone], [facility] department [place] Department of Health: ([phone]) and PCP office for questions related to their healthcare. CTN/ACM provided contact information for future needs. Reviewed and educated patient on any new and changed medications related to discharge diagnosis. Patient declined full med review, 1111F not entered. Patient/family/caregiver given information for [employer] and agrees to enroll no  Patient's preferred e-mail: declines   Patient's preferred phone number: declines      Care Transitions 24 Hour Call    Do you have a copy of your discharge instructions?: Yes  Do you have all of your prescriptions and are they filled?: Yes  Have you been contacted by a [facility] Pharmacist?: No  Have you scheduled your follow up appointment?: No  Were you discharged with any Home Care or Post Acute Services: Yes  Post Acute Services:  Outpatient/Community Services (Comment: 1/9/21-oxygen through [facility] [place])  Do you feel like you have everything you need to keep you well at home?: Yes  Care Transitions Interventions  No Identified Needs       -Spoke with patient for initial BPCI care transition call post hospital discharge. Explained the role of Care Transition Nurse and the BPCI-A program, patient is agreeable to follow up post discharge from the hospital.   -CTN explained that a member of the Care Transition Central Team will be contacting him for further follow up calls, patient is in agreement and denies any other needs or concerns at this time. -CTN will hand off to the Care Transition Central team to follow.            Follow Up  Future Appointments   Date Time Provider Tan Miranda   1/14/2021  1:30 PM Ginger Mark MD BDM PAIN [facility]   3/9/2021  9:30 AM Ursula Jeff MD AFLNEOHINFBD AFL [facility]   4/21/2021  1:15 PM MIGUE Romano - CNP AFL PULM CC [facility]   12/16/2021  1:30 PM Pop Massey MD TRAIL [facility]       Collette Couch, RN

## 2021-01-14 ENCOUNTER — CARE COORDINATION (OUTPATIENT)
Dept: CASE MANAGEMENT | Age: 71
End: 2021-01-14

## 2021-01-21 ENCOUNTER — CARE COORDINATION (OUTPATIENT)
Dept: CASE MANAGEMENT | Age: 71
End: 2021-01-21

## 2021-01-21 NOTE — CARE COORDINATION
[facility] Transitions Follow Up Call    2021    Patient: Gerardo Hugo  Patient : 1950   MRN: <[id number]>  Reason for Admission: Acute respiratory failure with hypoxia  Discharge Date: 21 RARS: Readmission Risk Score: 10    Attempted to contact patient for follow up BPCI-A transition call. Left voicemail message to return call with an update on condition since discharge. Contact information provided. Will continue to follow up. Care Transitions Subsequent and Final Call    Subsequent and Final Calls  Are you currently active with any services?: Outpatient/Community Services  Care Transitions Interventions  Other Interventions:            Follow Up  Future Appointments   Date Time Provider Tan Miranda   2021  3:15 PM MIGUE Cole CNP [facility]   2021  9:30 AM Yuri Silva MD AFLNEOHINFBD AFL [facility]   2021  1:15 PM MIGUE Cole CNP [facility]   2021  1:30 PM Murray Oseguera MD [facility]       Debra Mason LPN

## 2021-01-29 ENCOUNTER — CARE COORDINATION (OUTPATIENT)
Dept: CASE MANAGEMENT | Age: 71
End: 2021-01-29

## 2021-01-29 NOTE — CARE COORDINATION
[facility] Transitions Follow Up Call    2021    Patient: Santi Johns  Patient : 1950   MRN: [id number]  Reason for Admission:   Discharge Date: 21 RARS: Readmission Risk Score: 10         Spoke with: Santi Johns, patient    Care Transitions Subsequent and Final Call    Subsequent and Final Calls  Do you have any ongoing symptoms?: Yes  Patient-reported symptoms: Shortness of Breath  Have your medications changed?: No  Do you have any questions related to your medications?: No  Do you currently have any active services?: No  Are you currently active with any services?: Outpatient/Community Services  Do you have any needs or concerns that I can assist you with?: No  Identified Barriers: None  Care Transitions Interventions  Other Interventions: Follow Up:  Contacted patient for BPCI-A follow up. Nathanael Just stated that he is doing pretty good. Reports breathing has been improving. Continues to wear oxygen but stated mainly at night. O2 sats in the high 80's-low 90's on RA. Discussed importance of keeping O2 sats in the 90's. He verbalized understanding. He reports O2 sats 94% with oxygen. He denies having any c/o chest pain/discomfort, pressure, tightness, fever, chills. Having minimal cough. He is eating and drinking fluids. Discussed when to contact provider with any new or worsening symptoms. No questions or concerns at this time. Will continue to follow.       Future Appointments   Date Time Provider Tan Miranda   2021  9:30 AM Sea Johnson MD AFLNEOHINFBD AFL [facility] INF   2021  2:30 PM MIGUE Rowe - CNP AFL PULM CC [facility]   2021  1:30 PM MD WARD Collins [facility]       Riky Stinson RN

## 2021-02-02 ENCOUNTER — HOSPITAL ENCOUNTER (OUTPATIENT)
Age: 71
Discharge: HOME | End: 2021-02-04
Payer: MEDICARE

## 2021-02-02 ENCOUNTER — HOSPITAL ENCOUNTER (OUTPATIENT)
Dept: GENERAL RADIOLOGY | Age: 71
Discharge: HOME | End: 2021-02-04
Payer: MEDICARE

## 2021-02-02 DIAGNOSIS — J18.9 PNEUMONIA DUE TO INFECTIOUS ORGANISM, UNSPECIFIED LATERALITY, UNSPECIFIED PART OF LUNG: ICD-10-CM

## 2021-02-02 PROCEDURE — 71046 X-RAY EXAM CHEST 2 VIEWS: CPT

## 2021-02-09 ENCOUNTER — CARE COORDINATION (OUTPATIENT)
Dept: CASE MANAGEMENT | Age: 71
End: 2021-02-09

## 2021-02-09 NOTE — CARE COORDINATION
[facility] Transitions Follow Up Call    2021    Patient: Roasngela Parham  Patient : 1950   MRN: [id number]  Reason for Admission:   Discharge Date: 21 RARS: Readmission Risk Score: 10         Attempted to reach patient by phone for follow up; BPCI-A. Unable to reach patient; call picked up then disconnected. Will attempt outreach at a later time.      Sigifredoeal Spine, RN

## 2021-02-16 ENCOUNTER — CARE COORDINATION (OUTPATIENT)
Dept: CASE MANAGEMENT | Age: 71
End: 2021-02-16

## 2021-02-16 NOTE — CARE COORDINATION
[facility] Transitions Follow Up Call    2021    Patient: Fadumo Rogers  Patient : 1950   MRN: [id number]  Reason for Admission:   Discharge Date: 21 RARS: Readmission Risk Score: 10         Attempted to reach patient by phone for follow up; BPCI-A. Unable to reach patient; call picked up then disconnected. Will attempt outreach at a later time.        Sandi Flores RN

## 2021-02-17 LAB
AFB CULTURE (MYCOBACTERIA): ABNORMAL
AFB SMEAR: ABNORMAL
ORGANISM: ABNORMAL

## 2021-03-03 ENCOUNTER — HOSPITAL ENCOUNTER (OUTPATIENT)
Age: 71
Discharge: HOME | End: 2021-03-03
Payer: MEDICARE

## 2021-03-03 DIAGNOSIS — A31.0 MAI (MYCOBACTERIUM AVIUM-INTRACELLULARE) (HCC): ICD-10-CM

## 2021-03-03 DIAGNOSIS — A31.0 PULMONARY MYCOBACTERIUM AVIUM COMPLEX (MAC) INFECTION (HCC): ICD-10-CM

## 2021-03-03 LAB
ALBUMIN SERPL-MCNC: 4.4 G/DL (ref 3.5–5.2)
ALP BLD-CCNC: 80 U/L (ref 40–129)
ALT SERPL-CCNC: 7 U/L (ref 0–40)
ANION GAP SERPL CALCULATED.3IONS-SCNC: 9 MMOL/L (ref 7–16)
AST SERPL-CCNC: 15 U/L (ref 0–39)
BASOPHILS ABSOLUTE: 0.03 E9/L (ref 0–0.2)
BASOPHILS RELATIVE PERCENT: 0.6 % (ref 0–2)
BILIRUB SERPL-MCNC: 0.5 MG/DL (ref 0–1.2)
BUN BLDV-MCNC: 9 MG/DL (ref 8–23)
C-REACTIVE PROTEIN: 0.5 MG/DL (ref 0–0.4)
CALCIUM SERPL-MCNC: 9.8 MG/DL (ref 8.6–10.2)
CHLORIDE BLD-SCNC: 103 MMOL/L (ref 98–107)
CO2: 26 MMOL/L (ref 22–29)
CREAT SERPL-MCNC: 0.8 MG/DL (ref 0.7–1.2)
EOSINOPHILS ABSOLUTE: 0.11 E9/L (ref 0.05–0.5)
EOSINOPHILS RELATIVE PERCENT: 2.2 % (ref 0–6)
GFR AFRICAN AMERICAN: >60
GFR NON-AFRICAN AMERICAN: >60 ML/MIN/1.73
GLUCOSE BLD-MCNC: 79 MG/DL (ref 74–99)
HCT VFR BLD CALC: 54.6 % (ref 37–54)
HEMOGLOBIN: 18 G/DL (ref 12.5–16.5)
IMMATURE GRANULOCYTES #: 0.01 E9/L
IMMATURE GRANULOCYTES %: 0.2 % (ref 0–5)
LYMPHOCYTES ABSOLUTE: 1.22 E9/L (ref 1.5–4)
LYMPHOCYTES RELATIVE PERCENT: 24.4 % (ref 20–42)
MCH RBC QN AUTO: 30.4 PG (ref 26–35)
MCHC RBC AUTO-ENTMCNC: 33 % (ref 32–34.5)
MCV RBC AUTO: 92.1 FL (ref 80–99.9)
MONOCYTES ABSOLUTE: 0.38 E9/L (ref 0.1–0.95)
MONOCYTES RELATIVE PERCENT: 7.6 % (ref 2–12)
NEUTROPHILS ABSOLUTE: 3.25 E9/L (ref 1.8–7.3)
NEUTROPHILS RELATIVE PERCENT: 65 % (ref 43–80)
PDW BLD-RTO: 16.5 FL (ref 11.5–15)
PLATELET # BLD: 196 E9/L (ref 130–450)
PMV BLD AUTO: 9.8 FL (ref 7–12)
POTASSIUM SERPL-SCNC: 4.6 MMOL/L (ref 3.5–5)
RBC # BLD: 5.93 E12/L (ref 3.8–5.8)
SEDIMENTATION RATE, ERYTHROCYTE: 2 MM/HR (ref 0–15)
SODIUM BLD-SCNC: 138 MMOL/L (ref 132–146)
TOTAL PROTEIN: 7.3 G/DL (ref 6.4–8.3)
WBC # BLD: 5 E9/L (ref 4.5–11.5)

## 2021-03-03 PROCEDURE — 85651 RBC SED RATE NONAUTOMATED: CPT

## 2021-03-03 PROCEDURE — 86140 C-REACTIVE PROTEIN: CPT

## 2021-03-03 PROCEDURE — 36415 COLL VENOUS BLD VENIPUNCTURE: CPT

## 2021-03-03 PROCEDURE — 85025 COMPLETE CBC W/AUTO DIFF WBC: CPT

## 2021-03-03 PROCEDURE — 80053 COMPREHEN METABOLIC PANEL: CPT

## 2021-03-10 ENCOUNTER — CARE COORDINATION (OUTPATIENT)
Dept: CASE MANAGEMENT | Age: 71
End: 2021-03-10

## 2021-03-10 NOTE — CARE COORDINATION
[facility] Transitions Follow Up Call    3/10/2021    Patient: Saintclair Abbott  Patient : 1950   MRN: [id number]  Reason for Admission:   Discharge Date: 21 RARS: Readmission Risk Score: 10    Follow Up: Attempted to contact patient for BPCI-A follow up. Unable to reach patient. Left message with contact information and request for call back.       Future Appointments   Date Time Provider Tan Miranda   2021  9:00 AM Leesa Gacria MD AFLNEOHINFBD AFL [facility] INF   2021  9:45 AM DO ANJANA Pearson [facility] ENT [facility]   2021  2:30 PM MIGUE Abebe - CNP AFL PULM CC [facility] P[facility] CC   2021  1:30 PM Char Roche MD TRAIL [facility]       Carmencita Brooks RN

## 2021-03-19 ENCOUNTER — CARE COORDINATION (OUTPATIENT)
Dept: CASE MANAGEMENT | Age: 71
End: 2021-03-19

## 2021-03-19 NOTE — CARE COORDINATION
[facility] Transitions Follow Up Call    3/19/2021    Patient: Darshan Rossi  Patient : 1950   MRN: [id number]  Reason for Admission: Acute resp failure with hypoxia  Discharge Date: 21 RARS: Readmission Risk Score: 10         Spoke with: Marcie Russo stated he wears 02 4 LPM at night only, not wearing during the day unless he goes out. Pt admits to 02 dropping below 90%, sometimes below 80% with exertion and not wearing his oxygen. Pt went to pulmonologist on 3/15/2 for decreased sats, found drop into mid 660-'s with exertion. Stated he is using his inhaler, does not have a nebulizer. Appetite is good, no confusion, fever, CP, coughing. Encouraged pt to monitor sats and wear 02 if sats drop below 90% at rest or with exertion and not quickly recovering. Will route to Dr Abigail Dominguez. Care Transitions Subsequent and Final Call    Subsequent and Final Calls  Do you have any ongoing symptoms?: Yes  Onset of Patient-reported symptoms: Other  Patient-reported symptoms: Shortness of Breath  Interventions for patient-reported symptoms: Notified PCP/Physician  Have your medications changed?: No  Do you have any questions related to your medications?: No  Do you currently have any active services?: No  Are you currently active with any services?: Outpatient/Community Services  Do you have any needs or concerns that I can assist you with?: No  Identified Barriers: Lack of Motivation  Care Transitions Interventions  Other Interventions:            Follow Up  Future Appointments   Date Time Provider Tan Miranda   2021  9:00 AM Shaw Ng MD AFLNEOHINFBD AFL [facility] INF   2021  9:45 AM DO ANJANA Fong [facility] ENT [facility]   2021  2:30 PM MIGUE Allen - CNP AFL PULM CC [facility] PULM CC   2021  1:30 PM Elver Junior MD TRAIL [facility]       Deo Parekh RN

## 2021-03-25 ENCOUNTER — CARE COORDINATION (OUTPATIENT)
Dept: CASE MANAGEMENT | Age: 71
End: 2021-03-25

## 2021-03-25 NOTE — CARE COORDINATION
[facility] Transitions Follow Up Call    3/25/2021    Patient: Je Rivera  Patient : 1950   MRN: [id number]  Reason for Admission:   Discharge Date: 21 RARS: Readmission Risk Score: 10         Spoke with: Je Rivera, patient    Contacted patient for BPCI-A follow up. Marina Gonzalez stated that he is \"not too bad\". Reports breathing is still giving him problems. He stated breathing has not worsened but not getting any better. He reports O2 sats are in the mid to upper 80's when oxygen is off. O2 sats in the low 90's with oxygen on. Encouraged to use oxygen and emphasized importance of  keeping O2 sats in the 90's. He verbalized understanding and stated he is using the oxygen more throughout the day. Continues to use 4 L of oxygen at night. No c/o chest pain/discomfort, increased coughing, fever, chills. Patient had cough during conversation but stated he is only coughing occasionally. He is eating and drinking w/o issues. Discussed when to contact provider with any new or worsening symptoms and when to report to the ED. He verbalized understanding. No questions or concerns at this time. Will continue to follow. Care Transitions Subsequent and Final Call    Subsequent and Final Calls  Do you have any ongoing symptoms?: Yes  Patient-reported symptoms: Shortness of Breath  Have your medications changed?: No  Do you have any questions related to your medications?: No  Do you currently have any active services?: No  Are you currently active with any services?: Outpatient/Community Services  Do you have any needs or concerns that I can assist you with?: No  Care Transitions Interventions  Other Interventions:            Follow Up  Future Appointments   Date Time Provider Tan Miranda   2021  9:00 AM MD EPHRAIM CarusoNEOHINFBD AFL [facility] INF   2021  9:45 AM DO Berry Bills [facility]   2021  2:30 PM MIGUE Alanis - CNP AFL PULM CC [facility] P[facility]M

## 2021-03-31 ENCOUNTER — HOSPITAL ENCOUNTER (OUTPATIENT)
Age: 71
Discharge: HOME | End: 2021-03-31
Payer: MEDICARE

## 2021-03-31 LAB
BASOPHILS ABSOLUTE: 0.03 E9/L (ref 0–0.2)
BASOPHILS RELATIVE PERCENT: 0.6 % (ref 0–2)
EOSINOPHILS ABSOLUTE: 0.07 E9/L (ref 0.05–0.5)
EOSINOPHILS RELATIVE PERCENT: 1.3 % (ref 0–6)
HCT VFR BLD CALC: 52.4 % (ref 37–54)
HEMOGLOBIN: 17.3 G/DL (ref 12.5–16.5)
IMMATURE GRANULOCYTES #: 0.02 E9/L
IMMATURE GRANULOCYTES %: 0.4 % (ref 0–5)
LYMPHOCYTES ABSOLUTE: 0.98 E9/L (ref 1.5–4)
LYMPHOCYTES RELATIVE PERCENT: 18.7 % (ref 20–42)
MCH RBC QN AUTO: 30.3 PG (ref 26–35)
MCHC RBC AUTO-ENTMCNC: 33 % (ref 32–34.5)
MCV RBC AUTO: 91.8 FL (ref 80–99.9)
MONOCYTES ABSOLUTE: 0.38 E9/L (ref 0.1–0.95)
MONOCYTES RELATIVE PERCENT: 7.3 % (ref 2–12)
NEUTROPHILS ABSOLUTE: 3.76 E9/L (ref 1.8–7.3)
NEUTROPHILS RELATIVE PERCENT: 71.7 % (ref 43–80)
PDW BLD-RTO: 15.1 FL (ref 11.5–15)
PLATELET # BLD: 194 E9/L (ref 130–450)
PMV BLD AUTO: 10.1 FL (ref 7–12)
RBC # BLD: 5.71 E12/L (ref 3.8–5.8)
WBC # BLD: 5.2 E9/L (ref 4.5–11.5)

## 2021-03-31 PROCEDURE — 36415 COLL VENOUS BLD VENIPUNCTURE: CPT

## 2021-03-31 PROCEDURE — 87186 SC STD MICRODIL/AGAR DIL: CPT

## 2021-03-31 PROCEDURE — 87077 CULTURE AEROBIC IDENTIFY: CPT

## 2021-03-31 PROCEDURE — 87206 SMEAR FLUORESCENT/ACID STAI: CPT

## 2021-03-31 PROCEDURE — 87015 SPECIMEN INFECT AGNT CONCNTJ: CPT

## 2021-03-31 PROCEDURE — 87116 MYCOBACTERIA CULTURE: CPT

## 2021-03-31 PROCEDURE — 87070 CULTURE OTHR SPECIMN AEROBIC: CPT

## 2021-03-31 PROCEDURE — 85025 COMPLETE CBC W/AUTO DIFF WBC: CPT

## 2021-04-01 ENCOUNTER — CARE COORDINATION (OUTPATIENT)
Dept: CASE MANAGEMENT | Age: 71
End: 2021-04-01

## 2021-04-01 NOTE — CARE COORDINATION
[facility] Transitions Follow Up Call    2021    Patient: Tay Felton  Patient : 1950   MRN: [id number]  Reason for Admission:   Discharge Date: 21 RARS: Readmission Risk Score: 10    Attempted to contact patient for BPCI-A follow up. Unable to reach patient. Left message with contact information and request for call back.       Follow Up  Future Appointments   Date Time Provider Tan Miranda   2021  9:00 AM Ewa Dickerson MD AFLNEOHINFBD AFL [facility] INF   2021  9:45 AM DO Emmy Ayala [address] ENT [facility]   2021  2:30 PM MIGUE Cooley - CNP AFL PULM CC [facility] PULM CC   2021  1:30 PM Celso Toure MD TRAIL [facility]       Ricardo Rodriguez RN

## 2021-04-07 ENCOUNTER — HOSPITAL ENCOUNTER (INPATIENT)
Age: 71
LOS: 6 days | Discharge: NURSING, REHAB OR LONG-TERM CARE | DRG: 193 | End: 2021-04-13
Attending: EMERGENCY MEDICINE | Admitting: INTERNAL MEDICINE
Payer: MEDICARE

## 2021-04-07 ENCOUNTER — APPOINTMENT (OUTPATIENT)
Dept: GENERAL RADIOLOGY | Age: 71
DRG: 193 | End: 2021-04-07
Payer: MEDICARE

## 2021-04-07 DIAGNOSIS — J44.1 COPD EXACERBATION (HCC): ICD-10-CM

## 2021-04-07 DIAGNOSIS — J18.9 PNEUMONIA DUE TO ORGANISM: Primary | ICD-10-CM

## 2021-04-07 PROBLEM — J06.9 ACUTE RESPIRATORY DISEASE DUE TO SEVERE ACUTE RESPIRATORY SYNDROME CORONAVIRUS 2 (SARS-COV-2): Status: ACTIVE | Noted: 2021-01-03

## 2021-04-07 PROBLEM — U07.1 ACUTE RESPIRATORY DISEASE DUE TO SEVERE ACUTE RESPIRATORY SYNDROME CORONAVIRUS 2 (SARS-COV-2): Status: RESOLVED | Noted: 2021-01-03 | Resolved: 2021-04-07

## 2021-04-07 PROBLEM — U07.1 ACUTE RESPIRATORY DISEASE DUE TO SEVERE ACUTE RESPIRATORY SYNDROME CORONAVIRUS 2 (SARS-COV-2): Status: ACTIVE | Noted: 2021-01-03

## 2021-04-07 PROBLEM — J06.9 ACUTE RESPIRATORY DISEASE DUE TO SEVERE ACUTE RESPIRATORY SYNDROME CORONAVIRUS 2 (SARS-COV-2): Status: RESOLVED | Noted: 2021-01-03 | Resolved: 2021-04-07

## 2021-04-07 LAB
ADENOVIRUS BY PCR: NOT DETECTED
ALBUMIN SERPL-MCNC: 4.2 G/DL (ref 3.5–5.2)
ALP BLD-CCNC: 69 U/L (ref 40–129)
ALT SERPL-CCNC: 9 U/L (ref 0–40)
ANION GAP SERPL CALCULATED.3IONS-SCNC: 13 MMOL/L (ref 7–16)
AST SERPL-CCNC: 17 U/L (ref 0–39)
BACTERIA: ABNORMAL /HPF
BASOPHILS ABSOLUTE: 0 E9/L (ref 0–0.2)
BASOPHILS RELATIVE PERCENT: 0 % (ref 0–2)
BILIRUB SERPL-MCNC: 0.6 MG/DL (ref 0–1.2)
BILIRUBIN URINE: ABNORMAL
BLOOD, URINE: ABNORMAL
BORDETELLA PARAPERTUSSIS BY PCR: NOT DETECTED
BORDETELLA PERTUSSIS BY PCR: NOT DETECTED
BUN BLDV-MCNC: 13 MG/DL (ref 8–23)
C-REACTIVE PROTEIN: 7.7 MG/DL (ref 0–0.4)
CALCIUM SERPL-MCNC: 9.1 MG/DL (ref 8.6–10.2)
CHLAMYDOPHILIA PNEUMONIAE BY PCR: NOT DETECTED
CHLORIDE BLD-SCNC: 100 MMOL/L (ref 98–107)
CLARITY: CLEAR
CO2: 22 MMOL/L (ref 22–29)
COLOR: YELLOW
CORONAVIRUS 229E BY PCR: NOT DETECTED
CORONAVIRUS HKU1 BY PCR: NOT DETECTED
CORONAVIRUS NL63 BY PCR: NOT DETECTED
CORONAVIRUS OC43 BY PCR: NOT DETECTED
CREAT SERPL-MCNC: 0.7 MG/DL (ref 0.7–1.2)
EKG ATRIAL RATE: 112 BPM
EKG P AXIS: 61 DEGREES
EKG P-R INTERVAL: 146 MS
EKG Q-T INTERVAL: 358 MS
EKG QRS DURATION: 104 MS
EKG QTC CALCULATION (BAZETT): 488 MS
EKG R AXIS: -42 DEGREES
EKG T AXIS: 70 DEGREES
EKG VENTRICULAR RATE: 112 BPM
EOSINOPHILS ABSOLUTE: 0 E9/L (ref 0.05–0.5)
EOSINOPHILS RELATIVE PERCENT: 0 % (ref 0–6)
GFR AFRICAN AMERICAN: >60
GFR NON-AFRICAN AMERICAN: >60 ML/MIN/1.73
GLUCOSE BLD-MCNC: 110 MG/DL (ref 74–99)
GLUCOSE URINE: NEGATIVE MG/DL
HCT VFR BLD CALC: 51.1 % (ref 37–54)
HEMOGLOBIN: 17.3 G/DL (ref 12.5–16.5)
HUMAN METAPNEUMOVIRUS BY PCR: NOT DETECTED
HUMAN RHINOVIRUS/ENTEROVIRUS BY PCR: NOT DETECTED
INFLUENZA A BY PCR: NOT DETECTED
INFLUENZA B BY PCR: NOT DETECTED
KETONES, URINE: 40 MG/DL
LACTIC ACID, SEPSIS: 1.3 MMOL/L (ref 0.5–1.9)
LEUKOCYTE ESTERASE, URINE: NEGATIVE
LYMPHOCYTES ABSOLUTE: 1.01 E9/L (ref 1.5–4)
LYMPHOCYTES RELATIVE PERCENT: 9.6 % (ref 20–42)
MCH RBC QN AUTO: 30.4 PG (ref 26–35)
MCHC RBC AUTO-ENTMCNC: 33.9 % (ref 32–34.5)
MCV RBC AUTO: 89.8 FL (ref 80–99.9)
MONOCYTES ABSOLUTE: 0.4 E9/L (ref 0.1–0.95)
MONOCYTES RELATIVE PERCENT: 4.3 % (ref 2–12)
MUCUS: PRESENT /LPF
MYCOPLASMA PNEUMONIAE BY PCR: NOT DETECTED
MYELOCYTE PERCENT: 0.9 % (ref 0–0)
NEUTROPHILS ABSOLUTE: 8.69 E9/L (ref 1.8–7.3)
NEUTROPHILS RELATIVE PERCENT: 85.2 % (ref 43–80)
NITRITE, URINE: NEGATIVE
NUCLEATED RED BLOOD CELLS: 0 /100 WBC
PARAINFLUENZA VIRUS 1 BY PCR: NOT DETECTED
PARAINFLUENZA VIRUS 2 BY PCR: NOT DETECTED
PARAINFLUENZA VIRUS 3 BY PCR: NOT DETECTED
PARAINFLUENZA VIRUS 4 BY PCR: NOT DETECTED
PDW BLD-RTO: 14.8 FL (ref 11.5–15)
PH UA: 5.5 (ref 5–9)
PLATELET # BLD: 149 E9/L (ref 130–450)
PMV BLD AUTO: 10 FL (ref 7–12)
POIKILOCYTES: ABNORMAL
POTASSIUM REFLEX MAGNESIUM: 3.7 MMOL/L (ref 3.5–5)
PROCALCITONIN: 0.19 NG/ML (ref 0–0.08)
PROTEIN UA: ABNORMAL MG/DL
RBC # BLD: 5.69 E12/L (ref 3.8–5.8)
RBC UA: ABNORMAL /HPF (ref 0–2)
RESPIRATORY SYNCYTIAL VIRUS BY PCR: NOT DETECTED
SARS-COV-2, PCR: NOT DETECTED
SEDIMENTATION RATE, ERYTHROCYTE: 1 MM/HR (ref 0–15)
SODIUM BLD-SCNC: 135 MMOL/L (ref 132–146)
SPECIFIC GRAVITY UA: >=1.03 (ref 1–1.03)
TEAR DROP CELLS: ABNORMAL
TOTAL PROTEIN: 7.2 G/DL (ref 6.4–8.3)
UROBILINOGEN, URINE: 0.2 E.U./DL
WBC # BLD: 10.1 E9/L (ref 4.5–11.5)
WBC UA: ABNORMAL /HPF (ref 0–5)

## 2021-04-07 PROCEDURE — 6370000000 HC RX 637 (ALT 250 FOR IP): Performed by: EMERGENCY MEDICINE

## 2021-04-07 PROCEDURE — 94664 DEMO&/EVAL PT USE INHALER: CPT

## 2021-04-07 PROCEDURE — 84145 PROCALCITONIN (PCT): CPT

## 2021-04-07 PROCEDURE — 93010 ELECTROCARDIOGRAM REPORT: CPT | Performed by: INTERNAL MEDICINE

## 2021-04-07 PROCEDURE — 81001 URINALYSIS AUTO W/SCOPE: CPT

## 2021-04-07 PROCEDURE — 83605 ASSAY OF LACTIC ACID: CPT

## 2021-04-07 PROCEDURE — 2580000003 HC RX 258: Performed by: SPECIALIST

## 2021-04-07 PROCEDURE — 2580000003 HC RX 258: Performed by: EMERGENCY MEDICINE

## 2021-04-07 PROCEDURE — 71045 X-RAY EXAM CHEST 1 VIEW: CPT

## 2021-04-07 PROCEDURE — 80053 COMPREHEN METABOLIC PANEL: CPT

## 2021-04-07 PROCEDURE — 94640 AIRWAY INHALATION TREATMENT: CPT

## 2021-04-07 PROCEDURE — 93005 ELECTROCARDIOGRAM TRACING: CPT | Performed by: EMERGENCY MEDICINE

## 2021-04-07 PROCEDURE — 86140 C-REACTIVE PROTEIN: CPT

## 2021-04-07 PROCEDURE — 87088 URINE BACTERIA CULTURE: CPT

## 2021-04-07 PROCEDURE — 6360000002 HC RX W HCPCS: Performed by: INTERNAL MEDICINE

## 2021-04-07 PROCEDURE — 2060000000 HC ICU INTERMEDIATE R&B

## 2021-04-07 PROCEDURE — 99285 EMERGENCY DEPT VISIT HI MDM: CPT

## 2021-04-07 PROCEDURE — 96374 THER/PROPH/DIAG INJ IV PUSH: CPT

## 2021-04-07 PROCEDURE — 85651 RBC SED RATE NONAUTOMATED: CPT

## 2021-04-07 PROCEDURE — 0202U NFCT DS 22 TRGT SARS-COV-2: CPT

## 2021-04-07 PROCEDURE — 6370000000 HC RX 637 (ALT 250 FOR IP): Performed by: INTERNAL MEDICINE

## 2021-04-07 PROCEDURE — 6360000002 HC RX W HCPCS: Performed by: EMERGENCY MEDICINE

## 2021-04-07 PROCEDURE — 2580000003 HC RX 258: Performed by: INTERNAL MEDICINE

## 2021-04-07 PROCEDURE — 85025 COMPLETE CBC W/AUTO DIFF WBC: CPT

## 2021-04-07 PROCEDURE — 87449 NOS EACH ORGANISM AG IA: CPT

## 2021-04-07 PROCEDURE — 87040 BLOOD CULTURE FOR BACTERIA: CPT

## 2021-04-07 PROCEDURE — 96361 HYDRATE IV INFUSION ADD-ON: CPT

## 2021-04-07 PROCEDURE — 6360000002 HC RX W HCPCS: Performed by: SPECIALIST

## 2021-04-07 RX ORDER — CIPROFLOXACIN 2 MG/ML
750 INJECTION, SOLUTION INTRAVENOUS EVERY 12 HOURS
Status: DISCONTINUED | OUTPATIENT
Start: 2021-04-07 | End: 2021-04-07 | Stop reason: RX

## 2021-04-07 RX ORDER — OXYCODONE AND ACETAMINOPHEN 7.5; 325 MG/1; MG/1
1 TABLET ORAL EVERY 4 HOURS PRN
Status: DISCONTINUED | OUTPATIENT
Start: 2021-04-07 | End: 2021-04-07 | Stop reason: CLARIF

## 2021-04-07 RX ORDER — 0.9 % SODIUM CHLORIDE 0.9 %
1000 INTRAVENOUS SOLUTION INTRAVENOUS ONCE
Status: COMPLETED | OUTPATIENT
Start: 2021-04-07 | End: 2021-04-07

## 2021-04-07 RX ORDER — POTASSIUM CHLORIDE 7.45 MG/ML
10 INJECTION INTRAVENOUS PRN
Status: DISCONTINUED | OUTPATIENT
Start: 2021-04-07 | End: 2021-04-13 | Stop reason: HOSPADM

## 2021-04-07 RX ORDER — IPRATROPIUM BROMIDE AND ALBUTEROL SULFATE 2.5; .5 MG/3ML; MG/3ML
1 SOLUTION RESPIRATORY (INHALATION)
Status: DISCONTINUED | OUTPATIENT
Start: 2021-04-07 | End: 2021-04-13 | Stop reason: HOSPADM

## 2021-04-07 RX ORDER — ACETAMINOPHEN 325 MG/1
650 TABLET ORAL EVERY 4 HOURS PRN
Status: DISCONTINUED | OUTPATIENT
Start: 2021-04-07 | End: 2021-04-13 | Stop reason: HOSPADM

## 2021-04-07 RX ORDER — IBUPROFEN 200 MG
200 TABLET ORAL
Status: DISCONTINUED | OUTPATIENT
Start: 2021-04-07 | End: 2021-04-13 | Stop reason: HOSPADM

## 2021-04-07 RX ORDER — CIPROFLOXACIN 750 MG/1
750 TABLET, FILM COATED ORAL EVERY 12 HOURS SCHEDULED
Status: DISCONTINUED | OUTPATIENT
Start: 2021-04-08 | End: 2021-04-13 | Stop reason: HOSPADM

## 2021-04-07 RX ORDER — 0.9 % SODIUM CHLORIDE 0.9 %
30 INTRAVENOUS SOLUTION INTRAVENOUS ONCE
Status: COMPLETED | OUTPATIENT
Start: 2021-04-07 | End: 2021-04-07

## 2021-04-07 RX ORDER — ETHAMBUTOL HYDROCHLORIDE 400 MG/1
800 TABLET, FILM COATED ORAL
Status: DISCONTINUED | OUTPATIENT
Start: 2021-04-07 | End: 2021-04-07

## 2021-04-07 RX ORDER — OXYCODONE HYDROCHLORIDE AND ACETAMINOPHEN 5; 325 MG/1; MG/1
1 TABLET ORAL EVERY 4 HOURS PRN
Status: DISCONTINUED | OUTPATIENT
Start: 2021-04-07 | End: 2021-04-13 | Stop reason: HOSPADM

## 2021-04-07 RX ORDER — ARFORMOTEROL TARTRATE 15 UG/2ML
15 SOLUTION RESPIRATORY (INHALATION) 2 TIMES DAILY
Status: DISCONTINUED | OUTPATIENT
Start: 2021-04-07 | End: 2021-04-13 | Stop reason: HOSPADM

## 2021-04-07 RX ORDER — BUDESONIDE 0.25 MG/2ML
250 INHALANT ORAL 2 TIMES DAILY
Status: DISCONTINUED | OUTPATIENT
Start: 2021-04-07 | End: 2021-04-13 | Stop reason: HOSPADM

## 2021-04-07 RX ORDER — OXYCODONE HYDROCHLORIDE 5 MG/1
2.5 TABLET ORAL EVERY 4 HOURS PRN
Status: DISCONTINUED | OUTPATIENT
Start: 2021-04-07 | End: 2021-04-13 | Stop reason: HOSPADM

## 2021-04-07 RX ORDER — POTASSIUM CHLORIDE 20 MEQ/1
40 TABLET, EXTENDED RELEASE ORAL PRN
Status: DISCONTINUED | OUTPATIENT
Start: 2021-04-07 | End: 2021-04-13 | Stop reason: HOSPADM

## 2021-04-07 RX ORDER — ZINC SULFATE 50(220)MG
50 CAPSULE ORAL DAILY
Status: DISCONTINUED | OUTPATIENT
Start: 2021-04-07 | End: 2021-04-13 | Stop reason: HOSPADM

## 2021-04-07 RX ORDER — FAMOTIDINE 20 MG/1
20 TABLET, FILM COATED ORAL DAILY
Status: DISCONTINUED | OUTPATIENT
Start: 2021-04-07 | End: 2021-04-13 | Stop reason: HOSPADM

## 2021-04-07 RX ORDER — LANOLIN ALCOHOL/MO/W.PET/CERES
9 CREAM (GRAM) TOPICAL NIGHTLY
Status: DISCONTINUED | OUTPATIENT
Start: 2021-04-07 | End: 2021-04-13 | Stop reason: HOSPADM

## 2021-04-07 RX ORDER — MOXIFLOXACIN HYDROCHLORIDE 400 MG/250ML
400 INJECTION, SOLUTION INTRAVENOUS EVERY 24 HOURS
Status: DISCONTINUED | OUTPATIENT
Start: 2021-04-07 | End: 2021-04-07

## 2021-04-07 RX ORDER — ETHAMBUTOL HYDROCHLORIDE 400 MG/1
800 TABLET, FILM COATED ORAL DAILY
Status: DISCONTINUED | OUTPATIENT
Start: 2021-04-09 | End: 2021-04-13 | Stop reason: HOSPADM

## 2021-04-07 RX ORDER — ETODOLAC 500 MG/1
500 TABLET, FILM COATED ORAL DAILY
Status: ON HOLD | COMMUNITY
End: 2021-04-13 | Stop reason: HOSPADM

## 2021-04-07 RX ORDER — SODIUM CHLORIDE 9 MG/ML
INJECTION, SOLUTION INTRAVENOUS CONTINUOUS
Status: DISCONTINUED | OUTPATIENT
Start: 2021-04-07 | End: 2021-04-13 | Stop reason: HOSPADM

## 2021-04-07 RX ORDER — SODIUM CHLORIDE 9 MG/ML
500 INJECTION, SOLUTION INTRAVENOUS CONTINUOUS
Status: DISCONTINUED | OUTPATIENT
Start: 2021-04-07 | End: 2021-04-07 | Stop reason: SDUPTHER

## 2021-04-07 RX ORDER — ACETAMINOPHEN 325 MG/1
650 TABLET ORAL ONCE
Status: COMPLETED | OUTPATIENT
Start: 2021-04-07 | End: 2021-04-07

## 2021-04-07 RX ORDER — LEVOFLOXACIN 5 MG/ML
750 INJECTION, SOLUTION INTRAVENOUS ONCE
Status: DISCONTINUED | OUTPATIENT
Start: 2021-04-07 | End: 2021-04-07

## 2021-04-07 RX ORDER — IPRATROPIUM BROMIDE AND ALBUTEROL SULFATE 2.5; .5 MG/3ML; MG/3ML
1 SOLUTION RESPIRATORY (INHALATION) ONCE
Status: COMPLETED | OUTPATIENT
Start: 2021-04-07 | End: 2021-04-07

## 2021-04-07 RX ORDER — ONDANSETRON 2 MG/ML
4 INJECTION INTRAMUSCULAR; INTRAVENOUS ONCE
Status: COMPLETED | OUTPATIENT
Start: 2021-04-07 | End: 2021-04-07

## 2021-04-07 RX ADMIN — FAMOTIDINE 20 MG: 20 TABLET ORAL at 17:51

## 2021-04-07 RX ADMIN — IBUPROFEN 200 MG: 200 TABLET, FILM COATED ORAL at 17:51

## 2021-04-07 RX ADMIN — BUDESONIDE 250 MCG: 0.25 SUSPENSION RESPIRATORY (INHALATION) at 21:05

## 2021-04-07 RX ADMIN — SODIUM CHLORIDE 500 ML: 9 INJECTION, SOLUTION INTRAVENOUS at 10:41

## 2021-04-07 RX ADMIN — ZINC SULFATE 220 MG (50 MG) CAPSULE 50 MG: CAPSULE at 17:50

## 2021-04-07 RX ADMIN — ACETAMINOPHEN 650 MG: 325 TABLET ORAL at 10:51

## 2021-04-07 RX ADMIN — IPRATROPIUM BROMIDE AND ALBUTEROL SULFATE 1 AMPULE: .5; 3 SOLUTION RESPIRATORY (INHALATION) at 21:05

## 2021-04-07 RX ADMIN — SODIUM CHLORIDE: 9 INJECTION, SOLUTION INTRAVENOUS at 19:20

## 2021-04-07 RX ADMIN — CIPROFLOXACIN 750 MG: 2 INJECTION, SOLUTION INTRAVENOUS at 13:10

## 2021-04-07 RX ADMIN — SODIUM CHLORIDE 2802 ML: 9 INJECTION, SOLUTION INTRAVENOUS at 14:24

## 2021-04-07 RX ADMIN — ENOXAPARIN SODIUM 40 MG: 40 INJECTION SUBCUTANEOUS at 17:51

## 2021-04-07 RX ADMIN — CEFEPIME 2000 MG: 2 INJECTION, POWDER, FOR SOLUTION INTRAVENOUS at 21:30

## 2021-04-07 RX ADMIN — ACETAMINOPHEN 650 MG: 325 TABLET ORAL at 21:30

## 2021-04-07 RX ADMIN — SODIUM CHLORIDE 1000 ML: 9 INJECTION, SOLUTION INTRAVENOUS at 12:52

## 2021-04-07 RX ADMIN — ONDANSETRON 4 MG: 2 INJECTION INTRAMUSCULAR; INTRAVENOUS at 10:51

## 2021-04-07 RX ADMIN — ARFORMOTEROL TARTRATE 15 MCG: 15 SOLUTION RESPIRATORY (INHALATION) at 21:05

## 2021-04-07 RX ADMIN — IPRATROPIUM BROMIDE AND ALBUTEROL SULFATE 1 AMPULE: .5; 3 SOLUTION RESPIRATORY (INHALATION) at 13:49

## 2021-04-07 ASSESSMENT — PAIN SCALES - GENERAL: PAINLEVEL_OUTOF10: 6

## 2021-04-07 NOTE — ED NOTES
-Medication reconciliation is now complete and up to date. Medications removed:  -Etodolac 500 mg 1 tablet BID  -Melatonin 9 mg qHS  -Percocet 7.5-325 mg 1 tablet every 4 hours as needed. -Zinc 50 mg 1 tablet daily. Medications Added:  -Etodolac 500 mg once daily  -Percocet 7.5-325 mg 1 tablet twice daily as needed  -Trelegy Ellipta 100-62.5-25 mcg/INH 1 puff once daily  -Moxifloxacin 400 mg 1 tablet once daily.     -I spoke with Dr. Vitaliy Rodriguez to inform him of this note.

## 2021-04-07 NOTE — CONSULTS
Pulmonary Consultation    Admit Date: 4/7/2021  Requesting Physician: Sherif Berg MD    Reason for consultation:  · End-stage COPD, pneumonia  HPI:  · Ahmet Charles is a 82-year-old white male well-known to me from the outpatient inpatient setting. He presents to the hospital with a fever up to 103 degrees over the past 24 hours. Seen and evaluated emergency room, the patient's chest radiograph evidence bibasilar infiltrates. He is currently admitted to a stepdown area. Seen by infectious disease, the patient's been started on antimicrobials. This p.m., he appears reasonably comfortable. · The patient has an extensive pulmonary history including that of Mycobacterium avium complex infection for which he has been followed by infectious disease for some time. He has end-stage chronic obstructive pulmonary disease, Gold class IV. He is rarely bronchospastic.     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 ([address] 08/15/2019    First seen by ID; treatment started 9/4/2019    Emphysema lung ([address]     Glucose intolerance 06/10/2019    Hilar adenopathy 06/10/2019    Hypophosphatemia 06/10/2019    Infection due to parainfluenza virus 3 06/10/2019    Left upper lobe pneumonia 10/12/2017    Pleural effusion on right 10/03/2019    Pulmonary emphysema with fibrosis of lung ([address] 11/15/2018    Pulmonary Mycobacterium avium complex (MAC) infection ([address] 07/12/2019 BAL results finally completed this date    Rhinovirus infection 10/16/2020    Right lower lobe pneumonia 11/15/2018    Recurrent 4/23/19    S/P lumbar fusion 06/01/2019    Thoracic ascending aortic aneurysm [facility] 11/15/2018    Proximal ascending aorta; 3.8 cm; 11/15/18     PSH:   Past Surgical History:   Procedure Laterality Date    ABSCESS DRAINAGE  2006    X2 RECTAL ABSCESS    BRONCHOSCOPY N/A 6/12/2019    BRONCHOSCOPY BRUSHINGS performed by Austin Starks MD at [facility] N/A 10/7/2019    BRONCHOSCOPY DIAGNOSTIC OR CELL [address] ONLY performed by Austin Starks MD at [facility]  11/18/2013    [facility]  03[facility]       Review of Systems:   · Constitutional: As noted in the HPI. · Eyes: No visual changes or diplopia. No scleral icterus. · ENT: No headaches, hearing loss or vertigo. No nasal congestion, or sore throat. · Cardiovascular: No chest pain, dyspnea on exertion, or palpitations. · Respiratory: See above  · Gastrointestinal: No abdominal pain, nausea or emesis. No diarrhea or rectal bleeding or melena. No change in bowel habits. · Genitourinary: No dysuria, urinary frequency, or incontinence. No hematuria. · Musculoskeletal: No gait disturbance, weakness or joint complaints. · Integumentary: No rash or pruritis. No abnormal pigmentation, hair or nail changes. · Neurological: No headache, diplopia, dizziness, tremor, change in muscle strength, numbness or tingling. No change in gait, balance, coordination, mood, affect, memory, mentation, behavior. · Psychiatric: No anxiety or depression. · Endocrine: No temperature intolerance, excessive thirst, fluid intake, urinary frequency, excessive appetite, or recent weight change. · Hematologic/Lymphatic: No abnormal bruising or bleeding, blood clots or swollen lymph nodes. No anemia, fever, chills, night sweats, or swollen glands.   · Allergic/Immunologic: No seasonal or perenial allergies. No history of hives or atopic dermatitis. Social History:  · Alcohol:  No  · Tobacco:   Past  · Employment:  no silica or asbestos exposure  · Family:  No family history of lung disease    Medications:   sodium chloride        ibuprofen  200 mg Oral TID WC    famotidine  20 mg Oral Daily    melatonin  9 mg Oral Nightly    zinc sulfate  50 mg Oral Daily    enoxaparin  40 mg Subcutaneous Daily    Arformoterol Tartrate  15 mcg Nebulization BID    budesonide  250 mcg Nebulization BID    ipratropium-albuterol  1 ampule Inhalation Q4H WA    cefepime  2,000 mg Intravenous Q12H    [START ON 2021] rifAMPin  600 mg Oral Q48H    [START ON 2021] ethambutol  800 mg Oral Daily    [START ON 2021] ciprofloxacin  750 mg Oral 2 times per day       Vitals:  Tmax:  VITALS:  /63   Pulse 99   Temp 98.9 °F (37.2 °C) (Axillary)   Resp 18   Ht 6' 1\" (1.854 m)   Wt 206 lb (93.4 kg)   SpO2 90%   BMI 27.18 kg/m²   24HR INTAKE/OUTPUT:  No intake or output data in the 24 hours ending 21 1730  CURRENT PULSE OXIMETRY:  SpO2: 90 %  24HR PULSE OXIMETRY RANGE:  SpO2  Av.8 %  Min: 90 %  Max: 95 %    EXAM:  General: No distress. Alert. Eyes: PERRL. No sclera icterus. No conjunctival injection. ENT: No discharge. Pharynx clear. Neck: Trachea midline. Normal thyroid. No jvd, no hjr. Resp: No wheezing. No accessory muscle use. No rales. No rhonchi. CV: Regular rate. Regular rhythm. No murmur No rub. Abd: Non-tender. Non-distended. No masses. No organmegaly. Normal bowel sounds. Skin: Warm and dry. No nodule on exposed extremities. No rash on exposed extremities. Lymph: No cervical LAD. No supraclavicular LAD. Ext: No joint deformity. No clubbing. No cyanosis. No edema  Neuro: Awake. Follows commands. Positive pupils/gag/corneals. Normal pain response.      Lab Results:  CBC:   Recent Labs     21  1035   WBC 10.1   HGB 17.3*   HCT 51.1   MCV 89.8    BMP:  Recent Labs     04/07/21  1035      K 3.7      CO2 22   BUN 13   CREATININE 0.7    ALB:3,BILIDIR:3,BILITOT:3,ALKPHOS:3)@    PT/INR: No results for input(s): PROTIME, INR in the last 72 hours. Cultures:  Sputum: not available  Blood: not available    ABG:   No results for input(s): PH, PO2, PCO2, HCO3, BE, O2SAT, METHB, O2HB, COHB, O2CON, HHB, THB in the last 72 hours. Films:     XR CHEST 1 VIEW   Final Result   New ground-glass opacity in the left lung base, in the region of previous   chronic interstitial and alveolar density, may reflect superimposed new   atelectasis and/or pneumonia. .        Assessment:  1. End-stage chronic obstructive pulmonary disease  2. Right-sided pleural scarring, chronic  3. New bilateral infiltrates likely representing pneumonia  4. History of Mycobacterium avium complex disease      Plan:  1. Empiric aerosol treatments  2. Supplemental oxygen  3. Antimicrobials per infectious disease      Thanks for letting us see this patient in consultation. Total time in reviewing the previous admissions and records, reviewing the current x-rays, labs, and discussing with clinical staff including nursing and physicians, exceeded 50 minutes. Please contact us with any questions. Office [phone] or after hours through [employer], x [phone]. Please note that voice recognition technology was used (while wearing a Covid mandated mask) in the preparation of this note and make therefore it may contain inadvertent transcription errors. If the patient is a COVID 19 isolation patient, the above physical exam reflects that of the examining physician for the day. Lori Correa M.D., F.C.C.P.     [facility], [address] [place] [address], [address] [place] [address]

## 2021-04-07 NOTE — ACP (ADVANCE CARE PLANNING)
10  Conversation Outcomes:  [x] ACP discussion completed  [] Existing advance directive reviewed with patient; no changes to patient's previously recorded wishes  [] New Advance Directive completed  [] Portable Do Not Rescitate prepared for Provider review and signature  [] POLST/POST/MOLST/MOST prepared for Provider review and signature      Follow-up plan:    [] Schedule follow-up conversation to continue planning  [x] Referred individual to Provider for additional questions/concerns   [] Advised patient/agent/surrogate to review completed ACP document and update if needed with changes in condition, patient preferences or care setting    [] This note routed to one or more involved healthcare providers

## 2021-04-07 NOTE — CONSULTS
[address] [place] [address] [facility]  Consultation Note     Admit Date: 4/7/2021 10:07 AM    Reason for Consult:   Pneumonia    Attending Physician:  Ulices Clay DO    HISTORY OF PRESENT ILLNESS:             The history is obtained from extensive review of available past medical records. The patient is a 78 y.o. male who p is known to the ID service. The patient has a history of Mycobacterium avium complex pneumonia and has been on almost 2 years of triple regimen (Clarithromycin, Ethambutol, Rifampin). He was admitted with SARS-CoV-2 in January 2021 and again tested positive for MAC. Clarithromycin was changed over to Moxifloxacin. He had been offered Arikayce but he could not afford the out-of-pocket expenses. The patient presents to the ED on 4/7/2021 with complaints of shortness of breath, productive yellowish sputum and a fever up to 104 °F.  He was given Ciprofloxacin at my recommendation. He is being admitted for pneumonia. Respiratory panel that included SARS-CoV-2 was negative.     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  Respiratory cultures have turned positive for Mycobacterium avium complex and Penicillium.  We requested sensitivities for MAC.  The organism was sensitive to Linezolid, Clarithromycin, Moxifloxacin and Amikacin.  Ethambutol and Rifampin were not tested because of \"inability of susceptibility test to predict outcome\".  We started With albuterol, Rifampin, Clarithromycin and IV Amikacin.  Seen in follow-up on 10/10/2020     May 2019.  Admitted to [facility] with shortness of breath, with fever of 101 °F and chills.  Treated with Vancomycin and Cefepime for a left upper lobe pneumonia.  Seen by ID.  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  This was colonized with MSSA but did not require treatment other than enzymatic debridement. Maddie Leroy was discharged on Levofloxacin. Past Surgical History:        Procedure Laterality Date    ABSCESS DRAINAGE  2006    X2 RECTAL ABSCESS    BRONCHOSCOPY N/A 6/12/2019    BRONCHOSCOPY BRUSHINGS performed by Mya Donovan MD at [place] [id number] 10/7/2019    BRONCHOSCOPY DIAGNOSTIC OR CELL [address] ONLY performed by Mya Donovan MD at [address] [place] [address]  11/18/2013    LUMBAR FUSION  03/2019    [facility]     Current Medications:   Scheduled Meds:   ipratropium-albuterol  1 ampule Inhalation Once    sodium chloride  1,000 mL Intravenous Once    ciprofloxacin  750 mg Intravenous Q12H    sodium chloride  30 mL/kg Intravenous Once     Continuous Infusions:   sodium chloride Stopped (04/07/21 1128)     PRN Meds:    Allergies:  Patient has no known allergies.     Social History:   Social History     Socioeconomic History    Marital status:      Spouse name: Not on file    Number of children: Not on file    Years of education: Not on file    Highest education level: Not on file   Occupational History    Not on file   Social Needs    Financial resource strain: Not on file    Food insecurity     Worry: Not on file     Inability: Not on file    Transportation needs     Medical: Not on file     Non-medical: Not on file   Tobacco Use    Smoking status: Former Smoker     Packs/day: 2.00     Years: 46.00     Pack years: 92.00     Types: Cigarettes     Start date: 10/12/1968     Quit date: 10/12/2014     Years since quittin.4    Smokeless tobacco: Never Used   Substance and Sexual Activity    Alcohol use: No    Drug use: No    Sexual activity: Not Currently     Partners: Female   Lifestyle    Physical activity     Days per week: Not on file     Minutes per session: Not on file    Stress: Not on file   Relationships    Social connections     Talks on phone: Not on file     Gets together: Not on file     Attends Voodoo service: Not on file     Active member of club or organization: Not on file     Attends meetings of clubs or organizations: Not on file     Relationship status: Not on file    Intimate partner violence     Fear of current or ex partner: Not on file     Emotionally abused: Not on file     Physically abused: Not on file     Forced sexual activity: Not on file   Other Topics Concern    Not on file   Social History Narrative    Not on file      Pets: 2 dogs and a parrot  Travel: None  Patient works as a     Family History:       Problem Relation Age of Onset    Other Mother          age 80    Other Father          age 80   . Otherwise non-pertinent to the chief complaint. REVIEW OF SYSTEMS:    Constitutional: Positive for fevers, chills, diaphoresis  Neurologic: Negative   Psychiatric: Negative  Rheumatologic: Negative   Endocrine: Negative  Hematologic: Negative  Immunologic: Immunizations, including SARS-CoV-2 up-to-date  ENT: Negative  Respiratory: As in the HPI  Cardiovascular: Negative  GI: Negative  : Negative  Musculoskeletal: Negative  Skin: No rashes.      PHYSICAL EXAM:    Vitals:   BP 87/67 Pulse 92   Temp 98.3 °F (36.8 °C) (Oral)   Resp 16   Ht 6' 1\" (1.854 m)   Wt 206 lb (93.4 kg)   SpO2 95%   BMI 27.18 kg/m²   Constitutional: The patient is awake, alert, and oriented. No distress. Sitting on a stretcher in the ED. Skin: Warm and dry. No rashes were noted. HEENT: Eyes show round, and reactive pupils. No jaundice. Moist mucous membranes, no ulcerations, no thrush. Neck: Supple to movements. No lymphadenopathy. Chest: No use of accessory muscles to breathe. Symmetrical expansion. Auscultation reveals no wheezing. Scattered crackles  Cardiovascular: S1 and S2 are rhythmic and regular. No murmurs appreciated. Abdomen: Positive bowel sounds to auscultation. Benign to palpation. No masses felt. No hepatosplenomegaly. Extremities: No clubbing, no cyanosis, no edema.   Lines: peripheral      CBC+dif:  Recent Labs     04/07/21  1035   WBC 10.1   HGB 17.3*   HCT 51.1   MCV 89.8      NEUTROABS 8.69*     Lab Results   Component Value Date    CRP 0.5 (H) 03/03/2021    CRP 0.7 (H) 01/02/2021    CRP 4.1 (H) 10/17/2020      No results found for: CRPHS  Lab Results   Component Value Date    SEDRATE 2 03/03/2021    SEDRATE 0 01/02/2021    SEDRATE 1 10/17/2020     Lab Results   Component Value Date    ALT 9 04/07/2021    AST 17 04/07/2021    ALKPHOS 69 04/07/2021    BILITOT 0.6 04/07/2021     Lab Results   Component Value Date     04/07/2021    K 3.7 04/07/2021     04/07/2021    CO2 22 04/07/2021    BUN 13 04/07/2021    CREATININE 0.7 04/07/2021    GFRAA >60 04/07/2021    LABGLOM >60 04/07/2021    GLUCOSE 110 04/07/2021    PROT 7.2 04/07/2021    LABALBU 4.2 04/07/2021    CALCIUM 9.1 04/07/2021    BILITOT 0.6 04/07/2021    ALKPHOS 69 04/07/2021    AST 17 04/07/2021    ALT 9 04/07/2021       Lab Results   Component Value Date    PROTIME 12.5 05/30/2019    INR 1.1 05/30/2019       Lab Results   Component Value Date    TSH 1.290 10/16/2020       Lab Results   Component Value Date COLORU Yellow 04/07/2021    PHUR 5.5 04/07/2021    LABCAST FEW 10/02/2019    WBCUA NONE 04/07/2021    RBCUA NONE 04/07/2021    MUCUS Present 04/07/2021    BACTERIA FEW 04/07/2021    CLARITYU Clear 04/07/2021    SPECGRAV >=1.030 04/07/2021    LEUKOCYTESUR Negative 04/07/2021    UROBILINOGEN 0.2 04/07/2021    BILIRUBINUR MODERATE 04/07/2021    BLOODU TRACE-LYSED 04/07/2021    GLUCOSEU Negative 04/07/2021       Lab Results   Component Value Date    HCO3 21.3 05/28/2019    BE -1.8 05/28/2019    O2SAT 91.6 05/28/2019    PH 7.440 05/28/2019    PCO2 32.1 05/28/2019    PO2 59.1 05/28/2019     Radiology:  Chest x-ray read    Microbiology:  Pending  No results for input(s): BC in the last 72 hours. No results for input(s): ORG in the last 72 hours. No results for input(s): Pandora Pouch in the last 72 hours. No results for input(s): STREPNEUMAGU in the last 72 hours. No results for input(s): LP1UAG in the last 72 hours. No results for input(s): ASO in the last 72 hours. No results for input(s): CULTRESP in the last 72 hours. No results for input(s): PROCAL in the last 72 hours. Assessment:  · HCAP. The last time he was in the hospital he had a respiratory culture with Pseudomonas  · Mycobacterium avium complex lung infection  · Fever secondary to pneumonia    Plan:    · Continue Ciprofloxacin  · Start Cefepime  · Hold Moxifloxacin  · Resume Ethambutol and Rifampin  · Check cultures, baseline ESR, CRP  · Monitor labs  · Will follow with you    Thank you for having us see this patient in consultation. Discussed with Dr. Peace Flor.     Lucy Jordan  12:57 PM  4/7/2021

## 2021-04-07 NOTE — ED PROVIDER NOTES
HPI:  4/7/21, Time: 10:28 AM EDT         Perla Aceves is a 78 y.o. male presenting to the ED for cough sob productive mucus I THINK I HAVE PNX, beginning 24 hours ago. The complaint has been persistent, moderate in severity, and worsened by nothing. Patient history of bullous emphysema is on oxygen as needed at night presents with cough productive sputum no hemoptysis. He thinks he may have pneumonia he has had it in the past.  He did have a Covid infection January 1 he had recently had his second vaccination several days ago. He does complain of fatigue and shortness of breath he was found to be 91% on room air in triage he was placed on 2 L nasal cannula. He does report a fever last night 103. 1. No close contacts are ill. He has no chest pain. He has nausea but no vomiting. He has no abdominal pain. He denies any urinary complaints. No melena hematochezia hematemesis reported    ROS:   Pertinent positives and negatives are stated within HPI, all other systems reviewed and are negative.  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     Past Surgical History:  has a past surgical orders placed or performed during the hospital encounter of 04/07/21   Lactate, Sepsis   Result Value Ref Range    Lactic Acid, Sepsis 1.3 0.5 - 1.9 mmol/L   CBC auto differential   Result Value Ref Range    WBC 10.1 4.5 - 11.5 E9/L    RBC 5.69 3.80 - 5.80 E12/L    Hemoglobin 17.3 (H) 12.5 - 16.5 g/dL    Hematocrit 51.1 37.0 - 54.0 %    MCV 89.8 80.0 - 99.9 fL    MCH 30.4 26.0 - 35.0 pg    MCHC 33.9 32.0 - 34.5 %    RDW 14.8 11.5 - 15.0 fL    Platelets 817 591 - 479 E9/L    MPV 10.0 7.0 - 12.0 fL    Neutrophils % 85.2 (H) 43.0 - 80.0 %    Lymphocytes % 9.6 (L) 20.0 - 42.0 %    Monocytes % 4.3 2.0 - 12.0 %    Eosinophils % 0.0 0.0 - 6.0 %    Basophils % 0.0 0.0 - 2.0 %    Neutrophils Absolute 8.69 (H) 1.80 - 7.30 E9/L    Lymphocytes Absolute 1.01 (L) 1.50 - 4.00 E9/L    Monocytes Absolute 0.40 0.10 - 0.95 E9/L    Eosinophils Absolute 0.00 (L) 0.05 - 0.50 E9/L    Basophils Absolute 0.00 0.00 - 0.20 E9/L    Myelocyte Percent 0.9 0 - 0 %    nRBC 0.0 /100 WBC    Poikilocytes 1+     Tear Drop Cells 1+    Comprehensive Metabolic Panel w/ Reflex to MG   Result Value Ref Range    Sodium 135 132 - 146 mmol/L    Potassium reflex Magnesium 3.7 3.5 - 5.0 mmol/L    Chloride 100 98 - 107 mmol/L    CO2 22 22 - 29 mmol/L    Anion Gap 13 7 - 16 mmol/L    Glucose 110 (H) 74 - 99 mg/dL    BUN 13 8 - 23 mg/dL    CREATININE 0.7 0.7 - 1.2 mg/dL    GFR Non-African American >60 >=60 mL/min/1.73    GFR African American >60     Calcium 9.1 8.6 - 10.2 mg/dL    Total Protein 7.2 6.4 - 8.3 g/dL    Albumin 4.2 3.5 - 5.2 g/dL    Total Bilirubin 0.6 0.0 - 1.2 mg/dL    Alkaline Phosphatase 69 40 - 129 U/L    ALT 9 0 - 40 U/L    AST 17 0 - 39 U/L   Urinalysis   Result Value Ref Range    Color, UA Yellow Straw/Yellow    Clarity, UA Clear Clear    Glucose, Ur Negative Negative mg/dL    Bilirubin Urine MODERATE (A) Negative    Ketones, Urine 40 (A) Negative mg/dL    Specific Gravity, UA >=1.030 1.005 - 1.030    Blood, Urine TRACE-LYSED Negative    pH, UA 5.5 5.0 - 9.0    Protein, UA TRACE Negative mg/dL    Urobilinogen, Urine 0.2 <2.0 E.U./dL    Nitrite, Urine Negative Negative    Leukocyte Esterase, Urine Negative Negative   Sedimentation Rate   Result Value Ref Range    Sed Rate 1 0 - 15 mm/Hr   Microscopic Urinalysis   Result Value Ref Range    Mucus, UA Present (A) None Seen /LPF    WBC, UA NONE 0 - 5 /HPF    RBC, UA NONE 0 - 2 /HPF    Bacteria, UA FEW (A) None Seen /HPF   EKG 12 lead   Result Value Ref Range    Ventricular Rate 112 BPM    Atrial Rate 112 BPM    P-R Interval 146 ms    QRS Duration 104 ms    Q-T Interval 358 ms    QTc Calculation (Bazett) 488 ms    P Axis 61 degrees    R Axis -42 degrees    T Axis 70 degrees       RADIOLOGY:  Interpreted by Radiologist.  XR CHEST 1 VIEW   Final Result   New ground-glass opacity in the left lung base, in the region of previous   chronic interstitial and alveolar density, may reflect superimposed new   atelectasis and/or pneumonia. EKG Interpretation  Interpreted by emergency department physician    Rhythm: sinus tachycardia  Rate: normal  Axis: LAD  Conduction: normal  ST Segments: nonspecific changes  T Waves: non specific changes    Clinical Impression: sinus tachycardia with PVCs  Comparison to prior EKG: None      ------------------------- NURSING NOTES AND VITALS REVIEWED ---------------------------   The nursing notes within the ED encounter and vital signs as below have been reviewed by myself. BP 87/67   Pulse 92   Temp 98.3 °F (36.8 °C) (Oral)   Resp 16   Ht 6' 1\" (1.854 m)   Wt 206 lb (93.4 kg)   SpO2 95%   BMI 27.18 kg/m²   Oxygen Saturation Interpretation: Abnormal 91 % on RA in Triage     The patients available past medical records and past encounters were reviewed.         ------------------------------ ED COURSE/MEDICAL DECISION MAKING----------------------  Medications   0.9 % sodium chloride infusion (0 mLs Intravenous Stopped 4/7/21 3188)   ipratropium-albuterol (Anne Boot) nebulizer solution 1 ampule (has no administration in time range)   0.9 % sodium chloride bolus (1,000 mLs Intravenous New Bag 4/7/21 1252)   ciprofloxacin (CIPRO) IVPB 750 mg (has no administration in time range)   0.9 % sodium chloride bolus (has no administration in time range)   acetaminophen (TYLENOL) tablet 650 mg (650 mg Oral Given 4/7/21 1051)   ondansetron (ZOFRAN) injection 4 mg (4 mg Intravenous Given 4/7/21 1051)             Medical Decision Making:    Patient presents with shortness of breath high fever cough history of MAC recent respiratory culture demonstrated Pseudomonas. Found to be hypoxic in triage he was started on oxygen. Septic work-up initiated. I did speak with Dr. Lloyd Mack for infectious disease wants him started on Cipro 750 IV. He was given Tylenol for headache and fever. He was given a DuoNeb treatment and 30/kg IV fluids. Labs were reviewed interpreted by me lactic acid was normal.  Patient appears clinically dehydrated. Consultation was made with Dr. Jey Escobedo for admission. Re-Evaluations:             Re-evaluation. Patients symptoms are improving      Consultations:             Dr. Lloyd Mack and Dr. Kaye Sandoval: NONE        This patient's ED course included: a personal history and physicial examination, re-evaluation prior to disposition, multiple bedside re-evaluations, IV medications, cardiac monitoring, continuous pulse oximetry, complex medical decision making and emergency management, Extensive chart review and a personal history and physicial eaxmination    This patient has remained hemodynamically stable, improved and been closely monitored during their ED course. Counseling: The emergency provider has spoken with the patient and discussed todays results, in addition to providing specific details for the plan of care and counseling regarding the diagnosis and prognosis. Questions are answered at this time and they are agreeable with the plan. --------------------------------- IMPRESSION AND DISPOSITION ---------------------------------    IMPRESSION  1. Pneumonia due to organism    2. COPD exacerbation ([facility] [address]        DISPOSITION  Disposition: Admit to telemetry  Patient condition is serious        NOTE: This report was transcribed using voice recognition software.  Every effort was made to ensure accuracy; however, inadvertent computerized transcription errors may be present       Ira Dahl DO  04/07/21 5510

## 2021-04-08 ENCOUNTER — APPOINTMENT (OUTPATIENT)
Dept: GENERAL RADIOLOGY | Age: 71
DRG: 193 | End: 2021-04-08
Payer: MEDICARE

## 2021-04-08 ENCOUNTER — CARE COORDINATION (OUTPATIENT)
Dept: CASE MANAGEMENT | Age: 71
End: 2021-04-08

## 2021-04-08 PROBLEM — I50.31 ACUTE HEART FAILURE WITH PRESERVED EJECTION FRACTION (HCC): Status: ACTIVE | Noted: 2021-04-08

## 2021-04-08 LAB
ALBUMIN SERPL-MCNC: 2.9 G/DL (ref 3.5–5.2)
ALP BLD-CCNC: 52 U/L (ref 40–129)
ALT SERPL-CCNC: 6 U/L (ref 0–40)
ANION GAP SERPL CALCULATED.3IONS-SCNC: 7 MMOL/L (ref 7–16)
AST SERPL-CCNC: 12 U/L (ref 0–39)
BASOPHILS ABSOLUTE: 0.01 E9/L (ref 0–0.2)
BASOPHILS RELATIVE PERCENT: 0.1 % (ref 0–2)
BILIRUB SERPL-MCNC: 0.4 MG/DL (ref 0–1.2)
BILIRUBIN DIRECT: <0.2 MG/DL (ref 0–0.3)
BILIRUBIN, INDIRECT: ABNORMAL MG/DL (ref 0–1)
BUN BLDV-MCNC: 8 MG/DL (ref 8–23)
C-REACTIVE PROTEIN: 9.2 MG/DL (ref 0–0.4)
CALCIUM SERPL-MCNC: 8.1 MG/DL (ref 8.6–10.2)
CHLORIDE BLD-SCNC: 102 MMOL/L (ref 98–107)
CHOLESTEROL, TOTAL: 109 MG/DL (ref 0–199)
CO2: 22 MMOL/L (ref 22–29)
CREAT SERPL-MCNC: 0.6 MG/DL (ref 0.7–1.2)
EOSINOPHILS ABSOLUTE: 0.04 E9/L (ref 0.05–0.5)
EOSINOPHILS RELATIVE PERCENT: 0.5 % (ref 0–6)
FOLATE: 6.8 NG/ML (ref 4.8–24.2)
GFR AFRICAN AMERICAN: >60
GFR NON-AFRICAN AMERICAN: >60 ML/MIN/1.73
GLUCOSE BLD-MCNC: 113 MG/DL (ref 74–99)
HBA1C MFR BLD: 5.1 % (ref 4–5.6)
HCT VFR BLD CALC: 40.9 % (ref 37–54)
HDLC SERPL-MCNC: 42 MG/DL
HEMOGLOBIN: 13.6 G/DL (ref 12.5–16.5)
IMMATURE GRANULOCYTES #: 0.02 E9/L
IMMATURE GRANULOCYTES %: 0.3 % (ref 0–5)
L. PNEUMOPHILA SEROGP 1 UR AG: NORMAL
LDL CHOLESTEROL CALCULATED: 57 MG/DL (ref 0–99)
LYMPHOCYTES ABSOLUTE: 0.58 E9/L (ref 1.5–4)
LYMPHOCYTES RELATIVE PERCENT: 7.4 % (ref 20–42)
MAGNESIUM: 1.5 MG/DL (ref 1.6–2.6)
MCH RBC QN AUTO: 30.1 PG (ref 26–35)
MCHC RBC AUTO-ENTMCNC: 33.3 % (ref 32–34.5)
MCV RBC AUTO: 90.5 FL (ref 80–99.9)
MONOCYTES ABSOLUTE: 0.71 E9/L (ref 0.1–0.95)
MONOCYTES RELATIVE PERCENT: 9.1 % (ref 2–12)
NEUTROPHILS ABSOLUTE: 6.46 E9/L (ref 1.8–7.3)
NEUTROPHILS RELATIVE PERCENT: 82.6 % (ref 43–80)
PDW BLD-RTO: 14.9 FL (ref 11.5–15)
PHOSPHORUS: 2 MG/DL (ref 2.5–4.5)
PLATELET # BLD: 131 E9/L (ref 130–450)
PMV BLD AUTO: 9.5 FL (ref 7–12)
POTASSIUM SERPL-SCNC: 3.6 MMOL/L (ref 3.5–5)
PRO-BNP: 1132 PG/ML (ref 0–125)
RBC # BLD: 4.52 E12/L (ref 3.8–5.8)
RBC # BLD: NORMAL 10*6/UL
SEDIMENTATION RATE, ERYTHROCYTE: 10 MM/HR (ref 0–15)
SODIUM BLD-SCNC: 131 MMOL/L (ref 132–146)
STREP PNEUMONIAE ANTIGEN, URINE: NORMAL
TOTAL PROTEIN: 5.4 G/DL (ref 6.4–8.3)
TRIGL SERPL-MCNC: 49 MG/DL (ref 0–149)
TROPONIN: <0.01 NG/ML (ref 0–0.03)
TROPONIN: <0.01 NG/ML (ref 0–0.03)
TSH SERPL DL<=0.05 MIU/L-ACNC: 1.84 UIU/ML (ref 0.27–4.2)
URIC ACID, SERUM: 3.4 MG/DL (ref 3.4–7)
VITAMIN B-12: 289 PG/ML (ref 211–946)
VLDLC SERPL CALC-MCNC: 10 MG/DL
WBC # BLD: 7.8 E9/L (ref 4.5–11.5)

## 2021-04-08 PROCEDURE — 94640 AIRWAY INHALATION TREATMENT: CPT

## 2021-04-08 PROCEDURE — 84100 ASSAY OF PHOSPHORUS: CPT

## 2021-04-08 PROCEDURE — 84443 ASSAY THYROID STIM HORMONE: CPT

## 2021-04-08 PROCEDURE — 84484 ASSAY OF TROPONIN QUANT: CPT

## 2021-04-08 PROCEDURE — 6360000002 HC RX W HCPCS: Performed by: SPECIALIST

## 2021-04-08 PROCEDURE — 6370000000 HC RX 637 (ALT 250 FOR IP): Performed by: INTERNAL MEDICINE

## 2021-04-08 PROCEDURE — XW033N5 INTRODUCTION OF MEROPENEM-VABORBACTAM ANTI-INFECTIVE INTO PERIPHERAL VEIN, PERCUTANEOUS APPROACH, NEW TECHNOLOGY GROUP 5: ICD-10-PCS | Performed by: SPECIALIST

## 2021-04-08 PROCEDURE — 87206 SMEAR FLUORESCENT/ACID STAI: CPT

## 2021-04-08 PROCEDURE — 85025 COMPLETE CBC W/AUTO DIFF WBC: CPT

## 2021-04-08 PROCEDURE — 80048 BASIC METABOLIC PNL TOTAL CA: CPT

## 2021-04-08 PROCEDURE — 6360000002 HC RX W HCPCS: Performed by: INTERNAL MEDICINE

## 2021-04-08 PROCEDURE — 36415 COLL VENOUS BLD VENIPUNCTURE: CPT

## 2021-04-08 PROCEDURE — 83036 HEMOGLOBIN GLYCOSYLATED A1C: CPT

## 2021-04-08 PROCEDURE — 2580000003 HC RX 258: Performed by: SPECIALIST

## 2021-04-08 PROCEDURE — 6370000000 HC RX 637 (ALT 250 FOR IP): Performed by: SPECIALIST

## 2021-04-08 PROCEDURE — 71046 X-RAY EXAM CHEST 2 VIEWS: CPT

## 2021-04-08 PROCEDURE — 86140 C-REACTIVE PROTEIN: CPT

## 2021-04-08 PROCEDURE — 84550 ASSAY OF BLOOD/URIC ACID: CPT

## 2021-04-08 PROCEDURE — 2060000000 HC ICU INTERMEDIATE R&B

## 2021-04-08 PROCEDURE — 83735 ASSAY OF MAGNESIUM: CPT

## 2021-04-08 PROCEDURE — 80061 LIPID PANEL: CPT

## 2021-04-08 PROCEDURE — 87205 SMEAR GRAM STAIN: CPT

## 2021-04-08 PROCEDURE — 82746 ASSAY OF FOLIC ACID SERUM: CPT

## 2021-04-08 PROCEDURE — 87070 CULTURE OTHR SPECIMN AEROBIC: CPT

## 2021-04-08 PROCEDURE — 80076 HEPATIC FUNCTION PANEL: CPT

## 2021-04-08 PROCEDURE — 83880 ASSAY OF NATRIURETIC PEPTIDE: CPT

## 2021-04-08 PROCEDURE — 82607 VITAMIN B-12: CPT

## 2021-04-08 PROCEDURE — 85651 RBC SED RATE NONAUTOMATED: CPT

## 2021-04-08 PROCEDURE — 2700000000 HC OXYGEN THERAPY PER DAY

## 2021-04-08 RX ORDER — SODIUM CHLORIDE 9 MG/ML
INJECTION, SOLUTION INTRAVENOUS EVERY 8 HOURS
Status: DISCONTINUED | OUTPATIENT
Start: 2021-04-08 | End: 2021-04-13 | Stop reason: HOSPADM

## 2021-04-08 RX ORDER — NITROGLYCERIN 0.4 MG/1
0.4 TABLET SUBLINGUAL EVERY 5 MIN PRN
Status: DISCONTINUED | OUTPATIENT
Start: 2021-04-08 | End: 2021-04-13 | Stop reason: HOSPADM

## 2021-04-08 RX ORDER — TOBRAMYCIN INHALATION SOLUTION 300 MG/5ML
300 INHALANT RESPIRATORY (INHALATION) 2 TIMES DAILY
Status: DISCONTINUED | OUTPATIENT
Start: 2021-04-08 | End: 2021-04-08 | Stop reason: SDUPTHER

## 2021-04-08 RX ORDER — MORPHINE SULFATE 2 MG/ML
2 INJECTION, SOLUTION INTRAMUSCULAR; INTRAVENOUS EVERY 4 HOURS PRN
Status: DISCONTINUED | OUTPATIENT
Start: 2021-04-08 | End: 2021-04-13 | Stop reason: HOSPADM

## 2021-04-08 RX ORDER — TOBRAMYCIN SULFATE 40 MG/ML
300 INJECTION, SOLUTION INTRAMUSCULAR; INTRAVENOUS EVERY 12 HOURS
Status: DISCONTINUED | OUTPATIENT
Start: 2021-04-08 | End: 2021-04-13 | Stop reason: HOSPADM

## 2021-04-08 RX ADMIN — BUDESONIDE 250 MCG: 0.25 SUSPENSION RESPIRATORY (INHALATION) at 21:00

## 2021-04-08 RX ADMIN — IPRATROPIUM BROMIDE AND ALBUTEROL SULFATE 1 AMPULE: .5; 3 SOLUTION RESPIRATORY (INHALATION) at 21:00

## 2021-04-08 RX ADMIN — SODIUM CHLORIDE: 9 INJECTION, SOLUTION INTRAVENOUS at 20:35

## 2021-04-08 RX ADMIN — ENOXAPARIN SODIUM 40 MG: 40 INJECTION SUBCUTANEOUS at 17:29

## 2021-04-08 RX ADMIN — Medication 9 MG: at 21:45

## 2021-04-08 RX ADMIN — IPRATROPIUM BROMIDE AND ALBUTEROL SULFATE 1 AMPULE: .5; 3 SOLUTION RESPIRATORY (INHALATION) at 13:20

## 2021-04-08 RX ADMIN — CEFEPIME 2000 MG: 2 INJECTION, POWDER, FOR SOLUTION INTRAVENOUS at 11:20

## 2021-04-08 RX ADMIN — IPRATROPIUM BROMIDE AND ALBUTEROL SULFATE 1 AMPULE: .5; 3 SOLUTION RESPIRATORY (INHALATION) at 16:57

## 2021-04-08 RX ADMIN — ACETAMINOPHEN 650 MG: 325 TABLET ORAL at 01:47

## 2021-04-08 RX ADMIN — IBUPROFEN 200 MG: 200 TABLET, FILM COATED ORAL at 17:29

## 2021-04-08 RX ADMIN — TOBRAMYCIN 300 MG: 40 INJECTION INTRAMUSCULAR; INTRAVENOUS at 20:55

## 2021-04-08 RX ADMIN — IBUPROFEN 200 MG: 200 TABLET, FILM COATED ORAL at 11:21

## 2021-04-08 RX ADMIN — CIPROFLOXACIN HYDROCHLORIDE 750 MG: 750 TABLET, FILM COATED ORAL at 01:01

## 2021-04-08 RX ADMIN — FAMOTIDINE 20 MG: 20 TABLET ORAL at 11:21

## 2021-04-08 RX ADMIN — MEROPENEM 1000 MG: 1 INJECTION, POWDER, FOR SOLUTION INTRAVENOUS at 17:29

## 2021-04-08 RX ADMIN — ZINC SULFATE 220 MG (50 MG) CAPSULE 50 MG: CAPSULE at 11:21

## 2021-04-08 RX ADMIN — ARFORMOTEROL TARTRATE 15 MCG: 15 SOLUTION RESPIRATORY (INHALATION) at 21:00

## 2021-04-08 RX ADMIN — CIPROFLOXACIN HYDROCHLORIDE 750 MG: 750 TABLET, FILM COATED ORAL at 12:57

## 2021-04-08 RX ADMIN — OXYCODONE HYDROCHLORIDE AND ACETAMINOPHEN 1 TABLET: 5; 325 TABLET ORAL at 04:59

## 2021-04-08 ASSESSMENT — PAIN SCALES - GENERAL
PAINLEVEL_OUTOF10: 3
PAINLEVEL_OUTOF10: 0

## 2021-04-08 ASSESSMENT — PAIN DESCRIPTION - LOCATION: LOCATION: CHEST;RIB CAGE

## 2021-04-08 ASSESSMENT — PAIN DESCRIPTION - PAIN TYPE: TYPE: ACUTE PAIN

## 2021-04-08 NOTE — PROGRESS NOTES
Physical Therapy    Facility/Department: [address] INTERMEDIATE      NAME: Lukas Sadler  : 1950  MRN: [id number]    Date of Service: 2021    Attempted to see pt for PT evaluation. Hold per nursing.    Zohra PT [id number]

## 2021-04-08 NOTE — PROGRESS NOTES
[address] [place] [address] [facility]  Progress Note    SUBJECTIVE:  Chief Complaint   Patient presents with    Fatigue    Shortness of Breath     thinks he has pneumonia again     The patient had a bad night. He is still coughing. Producing minimal sputum. He is still having pleuritic type chest pain on the left side. He is still febrile. Poor appetite. Weakness. Review of systems:  As stated above in the chief complaint, otherwise negative. Medications:  Scheduled Meds:   ibuprofen  200 mg Oral TID WC    famotidine  20 mg Oral Daily    melatonin  9 mg Oral Nightly    zinc sulfate  50 mg Oral Daily    enoxaparin  40 mg Subcutaneous Daily    Arformoterol Tartrate  15 mcg Nebulization BID    budesonide  250 mcg Nebulization BID    ipratropium-albuterol  1 ampule Inhalation Q4H WA    cefepime  2,000 mg Intravenous Q12H    [START ON 2021] rifAMPin  600 mg Oral Q48H    [START ON 2021] ethambutol  800 mg Oral Daily    ciprofloxacin  750 mg Oral 2 times per day     Continuous Infusions:   sodium chloride 100 mL/hr at 21 1920     PRN Meds:trimethobenzamide, nitroGLYCERIN, morphine, potassium chloride **OR** potassium alternative oral replacement **OR** potassium chloride, acetaminophen, oxyCODONE-acetaminophen **AND** oxyCODONE    OBJECTIVE:  BP (!) 141/72   Pulse 94   Temp 99.2 °F (37.3 °C) (Oral)   Resp 22   Ht 6' 1\" (1.854 m)   Wt 206 lb (93.4 kg)   SpO2 90%   BMI 27.18 kg/m²   Temp  Av.8 °F (37.7 °C)  Min: 98 °F (36.7 °C)  Max: 101.4 °F (38.6 °C)  Constitutional: The patient is awake, alert, and oriented. Skin: Warm and dry. No rashes were noted. HEENT: Round and reactive pupils. Moist mucous membranes. No ulcerations or thrush. Neck: Supple to movements. Chest: No use of accessory muscles to breathe. Symmetrical expansion. Crackles over the left base posteriorly. Cardiovascular: S1 and S2 are rhythmic and regular. No murmurs appreciated. Abdomen: Positive bowel sounds to auscultation. Benign to palpation. No masses felt. No hepatosplenomegaly. Extremities: No clubbing, no cyanosis, no edema.   Lines: peripheral    Laboratory and Tests Review:  Lab Results   Component Value Date    WBC 7.8 04/08/2021    WBC 10.1 04/07/2021    WBC 5.2 03/31/2021    HGB 13.6 04/08/2021    HCT 40.9 04/08/2021    MCV 90.5 04/08/2021     04/08/2021     Lab Results   Component Value Date    NEUTROABS 6.46 04/08/2021    NEUTROABS 8.69 (H) 04/07/2021    NEUTROABS 3.76 03/31/2021     No results found for: [facility]  Lab Results   Component Value Date    ALT 6 04/08/2021    AST 12 04/08/2021    ALKPHOS 52 04/08/2021    BILITOT 0.4 04/08/2021     Lab Results   Component Value Date     04/08/2021    K 3.6 04/08/2021    K 3.7 04/07/2021     04/08/2021    CO2 22 04/08/2021    BUN 8 04/08/2021    CREATININE 0.6 04/08/2021    CREATININE 0.7 04/07/2021    CREATININE 0.8 03/03/2021    GFRAA >60 04/08/2021    LABGLOM >60 04/08/2021    GLUCOSE 113 04/08/2021    PROT 5.4 04/08/2021    LABALBU 2.9 04/08/2021    CALCIUM 8.1 04/08/2021    BILITOT 0.4 04/08/2021    ALKPHOS 52 04/08/2021    AST 12 04/08/2021    ALT 6 04/08/2021     Lab Results   Component Value Date    CRP 9.2 (H) 04/08/2021    CRP 7.7 (H) 04/07/2021    CRP 0.5 (H) 03/03/2021     Lab Results   Component Value Date    SEDRATE 10 04/08/2021    SEDRATE 1 04/07/2021    SEDRATE 2 03/03/2021     Radiology:  Chest x-ray service tomorrow clearly defined left lower lobe infiltrate    Microbiology:       Recent Labs     04/07/21  1047   PROCAL 0.19*       ASSESSMENT:  · Left lower lobe pneumonia/HCAP  · 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

## 2021-04-08 NOTE — CARE COORDINATION
Social Work/Discharge Planning:  Social Work consult noted. Patient in isolation. Social Work attempted to call patient on his room phone and no answer. Called patient wife Ronaldo Toscano (ph: [phone]) and completed initial assessment. Explained Social Work role and discussed transition of care/discharge planning. Patient lives with his wife in one story house with three steps to enter. PTA patient independent with no adaptive device. He is currently requiring 15 liters of oxygen. He wears 4-5 liters of oxygen through [address] He has no history with hhc or snf. Patient PCP is Dr. Jane Ruiz and pharmacy is [facility] [address] in [place] [address]. Ronaldo Toscano states discuss home health care options with her , IF he needs it at discharge. Ronaldo Toscano states plan is home at discharge. Called Carloskonrad Bledsoe with [employer] (ph: [phone]) and confirmed patient oxygen order is for 3.5 liters continuously of oxygen. Patient will need increase oxygen order if he is requiring more than baseline. Will continue to follow and assist with discharge planning.   Electronically signed by MALVIN Arriaga on 4/8/2021 at 12:15 PM

## 2021-04-08 NOTE — PROGRESS NOTES
Occupational Therapy  OT order received and chart reviewed. Hold d/t respiratory status this date per nursing. Will continue to follow. Thank you.     Eric Pugh OTR/L  [id number]

## 2021-04-08 NOTE — PROGRESS NOTES
Patient is running fevers, was 101.4 before tylenol. Packed the patient with ice packs to help bring down the temp. An hour after tylenol temp came down to 100.4 and the patient is now refusing ice packs. This RN educated the patient on the importance of this intervention to lower temperature. He still refuses and states \"it is making me cold. \"

## 2021-04-08 NOTE — CARE COORDINATION
[address] for Care Improvement ([facility]) Follow Up Call  Qualifying Diagnosis related to Pulmonary Function and Health. 4/8/2021  Patient Name:  Myla Rushing   YOB: 1950  Discharge Date:  1/8/21  RARS:  Readmission Risk Score: 13    PCP:  Maria Fernanda Clinton MD    Re-Hospitalized with Dx. PNA 4/7/21. BPCI 90 day Episode Completed today.   Danielle Quevedo, RN, CTN      Future Appointments   Date Time Provider Tan Miranda   4/27/2021  9:00 AM Madonna Freedman MD AFLNEOHINFBD AFL [facility] INF   5/26/2021  9:45 AM DO ANJANA [facility] ENT [facility]   7/27/2021  2:30 PM MIGUE Long - CNP AFL PULM CC AFL PULM CC   12/16/2021  1:30 PM Maria Fernanda Clinton MD TRAIL PC [facility]

## 2021-04-08 NOTE — PROGRESS NOTES
P Quality Flow/Interdisciplinary Rounds Progress Note        Quality Flow Rounds held on April 8, 2021    Disciplines Attending:  Bedside Nurse and     Lorin Wilkes was admitted on 4/7/2021 10:07 AM    Anticipated Discharge Date:  Expected Discharge Date: 04/12/21    Disposition:    Sandro Score:  Sandro Scale Score: 21    Readmission Risk              Risk of Unplanned Readmission:        12           Discussed patient goal for the day, patient clinical progression, and barriers to discharge. The following Goal(s) of the Day/Commitment(s) have been identified:  monitor fever, wean oxygen.       Mustapha Allison  April 8, 2021

## 2021-04-08 NOTE — PROGRESS NOTES
Pulmonary Progress Note    Admit Date: 2021  Hospital day                               PCP: Jocelin Shultz MD    Chief Complaint (s):  Patient Active Problem List   Diagnosis    COPD (chronic obstructive pulmonary disease) ([facility] [address]    Closed displaced fracture of second metatarsal bone of right foot with routine healing    COPD exacerbation ([facility] [address]    Bullous emphysema ([facility] [address]    Pulmonary emphysema with fibrosis of lung ([facility] [address]    Thoracic ascending aortic aneurysm ([facility] [address]    Pneumonia due to infectious organism    Cough with hemoptysis    Pneumonia, bacterial    S/P lumbar fusion    Sepsis ([facility] [address]    Infection due to parainfluenza virus 3    Hypophosphatemia    Glucose intolerance    Hilar adenopathy    SADE (mycobacterium avium-intracellulare) ([facility] [address]    Acute respiratory failure with hypoxia ([facility] [address]    Pleural effusion on right    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  No mucus production, no cough, no wheeze.       Vitals:  VITALS:  BP (!) 141/72   Pulse 94   Temp 99.2 °F (37.3 °C) (Oral)   Resp 22   Ht 6' 1\" (1.854 m)   Wt 206 lb (93.4 kg)   SpO2 90%   BMI 27.18 kg/m²     24HR INTAKE/OUTPUT:      Intake/Output Summary (Last 24 hours) at 2021 1352  Last data filed at 2021 1124  Gross per 24 hour   Intake 180 ml   Output 400 ml   Net -220 ml       24HR PULSE OXIMETRY RANGE:    SpO2  Av %  Min: 84 %  Max: 96 %    Medications:  IV:   sodium chloride 100 mL/hr at 21 1920       Scheduled Meds:   ibuprofen  200 mg Oral TID WC    famotidine  20 mg Oral Daily    melatonin  9 mg Oral Nightly    zinc sulfate  50 mg Oral Daily    enoxaparin  40 mg Subcutaneous Daily    Arformoterol Tartrate  15 mcg Nebulization BID    budesonide  250 mcg Nebulization BID    ipratropium-albuterol  1 ampule Inhalation Q4H WA    cefepime  2,000 mg Intravenous Q12H    [START ON 4/9/2021] rifAMPin  600 mg Oral Q48H    [START ON 4/9/2021] ethambutol  800 mg Oral Daily    ciprofloxacin  750 mg Oral 2 times per day       Diet:   DIET GENERAL;     EXAM:  General: No distress. Alert. Eyes: PERRL. No sclera icterus. No conjunctival injection. ENT: No discharge. Pharynx clear. Neck: Trachea midline. Normal thyroid. Resp: No accessory muscle use. Bibasilar rales. No wheezing. No rhonchi. CV: Regular rate. Regular rhythm. No murmur or rub. Abd: Non-tender. Non-distended. No masses. No organomegaly. Normal bowel sounds. Skin: Warm and dry. No nodule on exposed extremities. No rash on exposed extremities. Ext: No cyanosis, clubbing, edema  Lymph: No cervical LAD. No supraclavicular LAD. M/S: No cyanosis. No joint deformity. No clubbing. Neuro: Awake. Follows commands. Positive pupils/gag/corneals. Normal pain response. Results:  CBC:   Recent Labs     04/07/21  1035 04/08/21  0230   WBC 10.1 7.8   HGB 17.3* 13.6   HCT 51.1 40.9   MCV 89.8 90.5    131     BMP:   Recent Labs     04/07/21  1035 04/08/21  0230    131*   K 3.7 3.6    102   CO2 22 22   PHOS  --  2.0*   BUN 13 8   CREATININE 0.7 0.6*     LIVER PROFILE:   Recent Labs     04/07/21  1035 04/08/21  0230   AST 17 12   ALT 9 6   BILIDIR  --  <0.2   BILITOT 0.6 0.4   ALKPHOS 69 52     PT/INR: No results for input(s): PROTIME, INR in the last 72 hours. APTT: No results for input(s): APTT in the last 72 hours. Pathology:  1. N/A      Microbiology:  1. None    Recent ABG:   No results for input(s): PH, PO2, PCO2, HCO3, BE, O2SAT, METHB, O2HB, COHB, O2CON, HHB, THB in the last 72 hours. Recent Films:  XR CHEST (2 VW)   Final Result   Worsening pneumonia on the left. Pneumonia and or fibrosis on the right. Emphysema.          XR

## 2021-04-08 NOTE — H&P
History and Physical      CHIEF COMPLAINT: Cough, fever, short of breath. History of Present Illness: 59-year-old male patient of Dr. Randel Blizzard I am asked admit and follow. 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 Has a history of bullous emphysema, on oxygen at home as needed. Patient did have SARS-CoV-2  On 1/1/2021. In the ED SPO2 91% on room air. Had temperature of 103.1 the evening prior to admission. He was seen yesterday by ID service who noted that the patient had respiratory culture with Pseudomonas at his last hospitalization. Therefore his moxifloxacin was held, cefepime started as well as ciprofloxacin IV. Chest x-ray done in the ED with following results--     FINDINGS:   Large lung volumes may again reflect pulmonary emphysema. Normal cardiac silhouette size without definite vascular congestion. Chronic interstitial and alveolar density is again noted in the left lung   base.  New ground-glass opacity is noted in this region which may reflect   acute on chronic process. Stable nonspecific elevation of right diaphragm may be associated with   basilar scarring. No pneumothorax or definite pleural effusion.  No acute displaced fracture.       Impression:       New ground-glass opacity in the left lung base, in the region of previous   chronic interstitial and alveolar density, may reflect superimposed new   atelectasis and/or pneumonia. Chest x-ray done today as follows--    FINDINGS:   Worsening airspace disease at the left base likely representing pneumonia. Stable pneumonia or fibrosis at the right base.  Pulmonary emphysema is also   present.       Impression:       Worsening pneumonia on the left.  Pneumonia and or fibrosis on the right. Emphysema. proBNP today 1132; his last proBNP was 194 from 10/15/2020.   He had a 2D echo done 10/6/2020 with following effusion on right 10/03/2019    Pulmonary emphysema with fibrosis of lung ([facility] 11/15/2018    Pulmonary Mycobacterium avium complex (MAC) infection ([facility] 07/12/2019    BAL results finally completed this date    Rhinovirus infection 10/16/2020    Right lower lobe pneumonia 11/15/2018    Recurrent 4/23/19    S/P lumbar fusion 06/01/2019    Thoracic ascending aortic aneurysm ([facility] 11/15/2018    Proximal ascending aorta; 3.8 cm; 11/15/18         Past Surgical History:   Procedure Laterality Date    ABSCESS DRAINAGE  2006    X2 RECTAL ABSCESS    BRONCHOSCOPY N/A 6/12/2019    BRONCHOSCOPY BRUSHINGS performed by Britney López MD at [address] [place] [address] N/A 10/7/2019    BRONCHOSCOPY DIAGNOSTIC OR CELL [address] ONLY performed by Britney López MD at [place] [address]  11/18/2013    LUMBAR FUSION  03/2019    [facility]       Medications Prior to Admission:    Medications Prior to Admission: etodolac (LODINE) 500 MG tablet, Take 500 mg by mouth daily  COVID-19 mRNA Vacc, Moderna, 100 MCG/0.5ML SUSP injection, Inject 0.5 mLs into the muscle once *FIRST DOSE: 03/08/2021 SECOND DOSE: 04/05/2021*  moxifloxacin (AVELOX) 400 MG tablet, Take 1 tablet by mouth daily Do not take Clarithromycin  famotidine (PEPCID) 20 MG tablet, Take 1 tablet by mouth daily  oxyCODONE-acetaminophen (PERCOCET) 7.5-325 MG per tablet, Take 1 tablet by mouth 2 times daily as needed for Pain.    Fluticasone-Umeclidin-Vilant (TRELEGY ELLIPTA) 100-62.5-25 MCG/INH AEPB, Inhale 1 puff into the lungs daily  OXYGEN, Inhale 3.5 L into the lungs continuous prn   rifAMPin (RIFADIN) 300 MG capsule, Take 2 capsules by mouth three times a week Every M-W-F  ethambutol (MYAMBUTOL) 400 MG tablet, Take 2 tablets by mouth three times a week Every M-W-F  melatonin 3 MG TABS tablet, Take 3 tablets by mouth nightly  albuterol sulfate  (90 Base) MCG/ACT inhaler, Inhale 2 puffs into the lungs every 6 hours as needed for Wheezing or Shortness of Breath    Allergies:    Patient has no known allergies. Social History:    reports that he quit smoking about 6 years ago. His smoking use included cigarettes. He started smoking about 52 years ago. He has a 92.00 pack-year smoking history. He has never used smokeless tobacco. He reports that he does not drink alcohol or use drugs. Family History:   family history includes Other in his father and mother. REVIEW OF SYSTEMS:  As above in the HPI, otherwise negative    PHYSICAL EXAM:    VS: BP (!) 141/72   Pulse 94   Temp 99.2 °F (37.3 °C) (Oral)   Resp 22   Ht 6' 1\" (1.854 m)   Wt 206 lb (93.4 kg)   SpO2 92%   BMI 27.18 kg/m²     General appearance: Alert, Awake, Oriented times 3, uncomfortable because of chest pain and nausea  Skin: Warm and dry ; no rashes  Head: Normocephalic. No masses, lesions or tenderness noted  Eyes: Conjunctivae pink, sclera white. PERRL,EOM-I  Ears: External ears normal  Nose/Sinuses: Nares normal. Septum midline. Mucosa normal. No drainage  Oropharynx: Oropharynx clear with no exudate seen  Neck: Supple. No jugular venous distension, lymphadenopathy or thyromegaly Trachea midline  Lungs: Mostly clear rare rhonchi; no accessory muscle use  Heart: S1 S2  Regular rate and rhythm. No rub, murmur or gallop ; unable to reproduce chest pain with palpation of ribs or sternum  Abdomen: Soft, non-tender. BS normal. No masses, organomegaly; no rebound or guarding  Extremities: No edema, Peripheral pulses palpable  Musculoskeletal: Muscular strength appears intact. Neuro:  No focal motor defects ; II-XII grossly intact .  GLASER equally  Breast: deferred  Rectal: deferred  Genitalia:  deferred    LABS:  CBC:   Lab Results   Component Value Date    WBC 7.8 04/08/2021    RBC 4.52 04/08/2021    HGB 13.6 04/08/2021    HCT 40.9 04/08/2021    MCV 90.5 04/08/2021    MCH 30.1 04/08/2021    MCHC 33.3 04/08/2021    RDW 14.9 04/08/2021     04/08/2021    MPV 9.5 04/08/2021     CBC with Differential:    Lab Results   Component Value Date    WBC 7.8 04/08/2021    RBC 4.52 04/08/2021    HGB 13.6 04/08/2021    HCT 40.9 04/08/2021     04/08/2021    MCV 90.5 04/08/2021    MCH 30.1 04/08/2021    MCHC 33.3 04/08/2021    RDW 14.9 04/08/2021    NRBC 0.0 04/07/2021    LYMPHOPCT 7.4 04/08/2021    MONOPCT 9.1 04/08/2021    MYELOPCT 0.9 04/07/2021    BASOPCT 0.1 04/08/2021    MONOSABS 0.71 04/08/2021    LYMPHSABS 0.58 04/08/2021    EOSABS 0.04 04/08/2021    BASOSABS 0.01 04/08/2021     Hemoglobin/Hematocrit:    Lab Results   Component Value Date    HGB 13.6 04/08/2021    HCT 40.9 04/08/2021     CMP:    Lab Results   Component Value Date     04/08/2021    K 3.6 04/08/2021    K 3.7 04/07/2021     04/08/2021    CO2 22 04/08/2021    BUN 8 04/08/2021    CREATININE 0.6 04/08/2021    GFRAA >60 04/08/2021    LABGLOM >60 04/08/2021    GLUCOSE 113 04/08/2021    PROT 5.4 04/08/2021    LABALBU 2.9 04/08/2021    CALCIUM 8.1 04/08/2021    BILITOT 0.4 04/08/2021    ALKPHOS 52 04/08/2021    AST 12 04/08/2021    ALT 6 04/08/2021     BMP:    Lab Results   Component Value Date     04/08/2021    K 3.6 04/08/2021    K 3.7 04/07/2021     04/08/2021    CO2 22 04/08/2021    BUN 8 04/08/2021    LABALBU 2.9 04/08/2021    CREATININE 0.6 04/08/2021    CALCIUM 8.1 04/08/2021    GFRAA >60 04/08/2021    LABGLOM >60 04/08/2021    GLUCOSE 113 04/08/2021     Hepatic Function Panel:    Lab Results   Component Value Date    ALKPHOS 52 04/08/2021    ALT 6 04/08/2021    AST 12 04/08/2021    PROT 5.4 04/08/2021    BILITOT 0.4 04/08/2021    BILIDIR <0.2 04/08/2021    IBILI see below 04/08/2021    LABALBU 2.9 04/08/2021     Ionized Calcium:  No results found for: IONCA  Magnesium:    Lab Results   Component Value Date    MG 1.5 04/08/2021     Phosphorus:    Lab Results   Component Value Date    PHOS 2.0 04/08/2021     LDH:    Lab Results   Component Value Date     01/02/2021     Uric Acid:    Lab Results Component Value Date    LABURIC 3.4 04/08/2021     PT/INR:    Lab Results   Component Value Date    PROTIME 12.5 05/30/2019    INR 1.1 05/30/2019     Warfarin PT/INR:  No components found for: Key Prima  PTT:    Lab Results   Component Value Date    APTT 31.4 05/30/2019   [APTT}  Troponin:    Lab Results   Component Value Date    TROPONINI <0.01 01/02/2021     Last 3 Troponin:    Lab Results   Component Value Date    TROPONINI <0.01 01/02/2021    TROPONINI <0.01 01/01/2021    TROPONINI <0.01 10/16/2020     U/A:    Lab Results   Component Value Date    COLORU Yellow 04/07/2021    PROTEINU TRACE 04/07/2021    PHUR 5.5 04/07/2021    LABCAST FEW 10/02/2019    WBCUA NONE 04/07/2021    RBCUA NONE 04/07/2021    MUCUS Present 04/07/2021    BACTERIA FEW 04/07/2021    CLARITYU Clear 04/07/2021    SPECGRAV >=1.030 04/07/2021    LEUKOCYTESUR Negative 04/07/2021    UROBILINOGEN 0.2 04/07/2021    BILIRUBINUR MODERATE 04/07/2021    BLOODU TRACE-LYSED 04/07/2021    GLUCOSEU Negative 04/07/2021     HgBA1c:    Lab Results   Component Value Date    LABA1C 5.1 04/08/2021     FLP:    Lab Results   Component Value Date    TRIG 49 04/08/2021    HDL 42 04/08/2021    LDLCALC 57 04/08/2021    LABVLDL 10 04/08/2021     TSH:    Lab Results   Component Value Date    TSH 1.840 04/08/2021     VITAMIN B12: No components found for: B12  FOLATE:    Lab Results   Component Value Date    FOLATE 6.8 04/08/2021       RADIOLOGY:  XR CHEST (2 VW)   Final Result   Worsening pneumonia on the left. Pneumonia and or fibrosis on the right. Emphysema. XR CHEST 1 VIEW   Final Result   New ground-glass opacity in the left lung base, in the region of previous   chronic interstitial and alveolar density, may reflect superimposed new   atelectasis and/or pneumonia.              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 ([facility] [A31.2]    Glucose intolerance [E74.39]    Pulmonary emphysema with fibrosis of lung ([facility] [J43.9, J84.10]    [facility] [I71.2]    Bullous emphysema ([facility] [J43.9]    COPD (chronic obstructive pulmonary disease) ([facility] [J44.9]       PLAN:  Medications discussed with patient  GI prophylaxis  DVT prophylaxis  Consultants notes reviewed  Aerosol treatments  Reconsult cardiology  Serial troponins  EKG now  Tigan 200 IM every 6 hours as needed for nausea  Nitroglycerin sublingual 0.4 mg  Morphine 2 mg IV every 4 hours as needed for chest pain  Maxipime 2 g IV every 12 hours  Ciprofloxacin 750 mg twice a day by mouth  Rifampin 600 mg Monday Wednesday Friday  Ethambutol 800 mg Monday Wednesday Friday  Potassium placement protocol  Percocet 5 mg every 4 hours as needed for severe pain          Please note that over 50 minutes was spent in evaluating the patient, review of records and results, discussion with staff/family, etc.    Shamar Turner DO  10:51 AM  4/8/2021    Voice recognition software use for dictation

## 2021-04-08 NOTE — CONSULTS
CARDIOLOGY CONSULTATION    Patient Name:  Prisca Thao    :  1950    Reason for Consultation:   Shortness of breath and chest discomfort    History of Present Illness:   Prisca Thao returns to [facility], having recently been hospitalized for Covid. Importantly over the past few days following his follow-up COVID-19 injection he developed shortness of breath and a cough with generalized malaise. His symptoms rapidly progressed and he came to the emergency room for further evaluation. Following admission, his chest discomfort was exacerbated by coughing and increasing depth of breath. He denies any hemoptysis nor abnormal erythema or swelling of his lower extremities. He now presents with recurrent URI most likely related to COVID-19 and for me to evaluate his chest discomfort presently. Prior to this he denied any exertional chest discomfort but does experience shortness of breath as he does have underlying COPD. 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     Surgical History:   has a past surgical history that includes Abscess Drainage (); Colonoscopy (2013); lumbar fusion (2019); bronchoscopy (N/A, 2019); and bronchoscopy (N/A, 10/7/2019). Social History:   reports that he quit smoking about 6 years ago. His smoking use included cigarettes. He started smoking about 52 years ago. He has a 92.00 pack-year smoking history. He has never used smokeless tobacco. He reports that he does not drink alcohol or use drugs. Family History:  family history includes Other in his father and mother. Both parents  secondary to meniscus of old age. Medications:  Prior to Admission medications    Medication Sig Start Date End Date Taking? Authorizing Provider   etodolac (LODINE) 500 MG tablet Take 500 mg by mouth daily   Yes Historical Provider, MD CASTID-19 mRNA Vacc, Moderna, 100 MCG/0.5ML SUSP injection Inject 0.5 mLs into the muscle once *FIRST DOSE: 2021  SECOND DOSE: 2021*   Yes Historical Provider, MD   moxifloxacin (AVELOX) 400 MG tablet Take 1 tablet by mouth daily Do not take Clarithromycin 3/4/21 6/2/21 Yes Ewa Dickerson MD   famotidine (PEPCID) 20 MG tablet Take 1 tablet by mouth daily 21  Yes Sandip Chambers MD   oxyCODONE-acetaminophen (PERCOCET) 7.5-325 MG per tablet Take 1 tablet by mouth 2 times daily as needed for Pain.   3/11/20  Yes Historical Provider, MD   Fluticasone-Umeclidin-Vilant (TRELEGY ELLIPTA) 100-62.5-25 MCG/INH AEPB Inhale 1 puff into the lungs daily 19  Yes MIGUE Cooley - CNP   OXYGEN Inhale 3.5 L into the lungs continuous prn    Yes Historical Provider, MD   rifAMPin (RIFADIN) 300 MG capsule Take 2 capsules by mouth three times a week Every --F 3/5/21 6/3/21  Ewa Dickerson MD   ethambutol (MYAMBUTOL) 400 MG tablet Take 2 tablets by mouth three times a week Every --F 3/5/21 6/3/21  Ewa Dickerson MD   melatonin 3 MG TABS tablet Take 3 tablets by mouth nightly 21   Sandip Chambers MD   albuterol sulfate HFA 108 (90 Base) MCG/ACT inhaler Inhale 2 puffs into the lungs every 6 hours as needed for Wheezing or Shortness of Breath    Historical Provider, MD       Allergies:  Patient has no known allergies. Review of Systems:   · Constitutional: there has been no unanticipated weight loss. There's been no significant change in energy level, sleep pattern or activity level. No fever chills or rigors. · Eyes: No visual changes or diplopia. No scleral icterus. · ENT: No Headaches, hearing loss or vertigo. No mouth sores or sore throat. No change in taste or smell. · Cardiovascular: No chest discomfort, dyspnea on exertion, palpitations, loss of consciousness, no phlebitis, no claudication. · Respiratory: No cough or wheezing, no sputum production. No hemoptysis, pleuritic pain. · Gastrointestinal: No abdominal pain, appetite loss, blood in stools. No change in bowel habits. No hematemesis  · Genitourinary: No dysuria, trouble voiding or hematuria. No nocturia or increased frequency. · Musculoskeletal:  No gait disturbance, weakness or joint complaints. · Integumentary: No rash or pruritis. · Neurological: No headache, diplopia, change in muscle strength, numbness or tingling. No change in gait, balance, coordination, mood, affect, memory, mentation, behavior. · Psychiatric: No anxiety or depression. · Endocrine: No temperature intolerance. No excessive thirst, fluid intake, or urination. No tremor. · Hematologic/Lymphatic: No abnormal bruising or bleeding, blood clots or swollen lymph nodes. · Allergic/Immunologic: No nasal congestion or hives. Physical Examination:    Vital Signs: BP (!) 141/72   Pulse 94   Temp 99.2 °F (37.3 °C) (Oral)   Resp 22   Ht 6' 1\" (1.854 m)   Wt 206 lb (93.4 kg)   SpO2 90%   BMI 27.18 kg/m²   General appearance: Well preserved, mesomorphic body habitus, alert, no distress. Skin: Skin color, texture, turgor normal. No rashes or lesions.  No induration or tightening palpated. Head: Normocephalic. No masses, lesions, tenderness or abnormalities  Eyes: Conjunctivae/corneas clear. PERRL, EOMs intact. Sclera non icteric. Ears: External ears normal. Canals clear. TM's clear bilaterally. Hearing normal to finger rub. Nose/Sinuses: Nares normal. Septum midline. Mucosa normal. No drainage or sinus tenderness. Oropharynx: Lips, mucosa, and tongue normal. Oropharynx clear with no exudate seen. Neck: Neck supple and symmetric. No adenopathy. Thyroid symmetric, normal size, without nodules. Trachea is midline. Carotids brisk in upstroke without bruits, no abnormal JVP noted at 45°. Chest: Even excursion  Lungs: Lungs with coarse expiratory rhonchi to auscultation bilaterally. No retractions or use of accessory muscles. No tactile vocal fremitus. , crackles or rales. Heart:  S1 > S2. Regular rhythm. No gallop or murmur. No rub, palpable thrill or heave noted. PMI 5th intercostal space midclavicular line. Abdomen: Abdomen soft, mildly protuberant, non-tender. BS normal. No masses, organomegaly. No hernia noted. Extremities: Extremities normal. No deformities, edema, or skin discoloration. No cyanosis or clubbing noted to the nails. Peripheral pulses present 2+ upper extremities and present 2+  lower extremities. Musculoskeletal: Spine ROM normal. Muscular strength intact. Neuro: Cranial nerves intact. Motor: Strength 5/5 in all extremities. Reflexes 2+ in all extremities. No focal weakness. Sensory: grossly normal to touch. Coordination intact.     Pertinent Labs:  CBC:   Recent Labs     04/07/21  1035 04/08/21  0230   WBC 10.1 7.8   HGB 17.3* 13.6    131     BMP:  Recent Labs     04/07/21  1035 04/08/21  0230    131*   K 3.7 3.6    102   CO2 22 22   BUN 13 8   CREATININE 0.7 0.6*   GLUCOSE 110* 113*   LABGLOM >60 >60     ABGs:   Lab Results   Component Value Date    PH 7.440 05/28/2019    PO2 59.1 05/28/2019    PCO2 32.1 05/28/2019     INR:   No results for input(s): INR in the last 72 hours. PRO-BNP:   Lab Results   Component Value Date    PROBNP 1,132 (H) 2021    PROBNP 194 (H) 10/15/2020      Cardiac Injury Profile:   Recent Labs     21  1250   TROPONINI <0.01      Lipid Profile:   Lab Results   Component Value Date    TRIG 49 2021    HDL 42 2021    LDLCALC 57 2021    CHOL 109 2021      Hemoglobin A1C: No components found for: HGBA1C   ECG:  See report  ECHO: 2019  Summary   Left ventricle grossly normal in size.   Normal LV segmental wall motion.   Normal left ventricular wall thickness.   Estimated left ventricular ejection fraction is 62±5%.   <50% criteria for diastolic dysfunction.   The LAESV Index is <34ml/m2.   Moderately dilated right ventricle.   Right ventricular segmental wall motion is normal.   Physiologic and/or trace mitral regurgitation is present.   Trace-mild aortic regurgitation is noted.   Physiologic and/or trace tricuspid regurgitation.  RVSP is 29 mmHg.   Physiologic and/or trace pulmonic regurgitation present.   Technically fair quality study. Radiology:  Xr Chest Standard (2 Vw)    Result Date: 2019  Patient MRN: [id number] : 1950 Age:  79 years Gender: Male Order Date: 2019 12:00 PM Exam: XR CHEST (2 VW) Number of Images: 2 views Indication:   chest pain chest pain shortness of breath Comparison: Prior chest radiograph from 2019 is available Findings: Examination of the chest in PA and lateral views demonstrate hyperaeration of lungs suggesting of chronic obstructive pulmonary disease. There is interstitial fibrosis seen in both lung bases. There is flattening of both hemidiaphragms. There is no evidence of airspace consolidation or pulmonary venous congestion. There is pleural reaction seen in both costophrenic sulcus more prominent on the right as compared to the left. . The heart is normal in size and configuration. The trachea is in the midline.  The mediastinum and reflects his coughing and soreness in the same area of his chest that he points out. He denies exertional chest discomfort presently. I have spent more than 45 minutes face to face with Vickie Jordan reviewing notes and laboratory data with greater than 50% of this time instructing and counseling the patient regarding my findings and recommendations and I have answered all questions as posed to me by . Teodora Santos. Thank you, Ernesto Moseley MD for allowing me to consult in the care of this patient. Moriah Nicholas DO, FACP, FACC, [facility]    NOTE:  This report was transcribed using voice recognition software. Every effort was made to ensure accuracy; however, inadvertent computerized transcription errors may be present.

## 2021-04-08 NOTE — PLAN OF CARE
Problem: Falls - Risk of:  Goal: Will remain free from falls  Description: Will remain free from falls  Outcome: Met This Shift  Goal: Absence of physical injury  Description: Absence of physical injury  Outcome: Met This Shift     Problem: Health Behavior:  Goal: Ability to identify and utilize available resources and services will improve  Description: Ability to identify and utilize available resources and services will improve  Outcome: Met This Shift     Problem: Role Relationship:  Goal: Ability to interact with others will improve  Description: Ability to interact with others will improve  Outcome: Met This Shift

## 2021-04-09 PROBLEM — J96.21 ACUTE ON CHRONIC RESPIRATORY FAILURE WITH HYPOXIA (HCC): Status: ACTIVE | Noted: 2019-10-02

## 2021-04-09 LAB
ALBUMIN SERPL-MCNC: 3.1 G/DL (ref 3.5–5.2)
ALP BLD-CCNC: 63 U/L (ref 40–129)
ALT SERPL-CCNC: 12 U/L (ref 0–40)
ANION GAP SERPL CALCULATED.3IONS-SCNC: 10 MMOL/L (ref 7–16)
AST SERPL-CCNC: 24 U/L (ref 0–39)
BASOPHILS ABSOLUTE: 0.04 E9/L (ref 0–0.2)
BASOPHILS RELATIVE PERCENT: 0.3 % (ref 0–2)
BILIRUB SERPL-MCNC: 0.7 MG/DL (ref 0–1.2)
BILIRUBIN DIRECT: 0.2 MG/DL (ref 0–0.3)
BILIRUBIN, INDIRECT: 0.5 MG/DL (ref 0–1)
BUN BLDV-MCNC: 8 MG/DL (ref 8–23)
C-REACTIVE PROTEIN: 18.5 MG/DL (ref 0–0.4)
CALCIUM SERPL-MCNC: 8.9 MG/DL (ref 8.6–10.2)
CHLORIDE BLD-SCNC: 97 MMOL/L (ref 98–107)
CO2: 27 MMOL/L (ref 22–29)
CREAT SERPL-MCNC: 0.8 MG/DL (ref 0.7–1.2)
EOSINOPHILS ABSOLUTE: 0.01 E9/L (ref 0.05–0.5)
EOSINOPHILS RELATIVE PERCENT: 0.1 % (ref 0–6)
GFR AFRICAN AMERICAN: >60
GFR NON-AFRICAN AMERICAN: >60 ML/MIN/1.73
GLUCOSE BLD-MCNC: 130 MG/DL (ref 74–99)
GRAM STAIN ORDERABLE: NORMAL
HCT VFR BLD CALC: 45.9 % (ref 37–54)
HEMOGLOBIN: 15.9 G/DL (ref 12.5–16.5)
IMMATURE GRANULOCYTES #: 0.08 E9/L
IMMATURE GRANULOCYTES %: 0.7 % (ref 0–5)
LYMPHOCYTES ABSOLUTE: 0.79 E9/L (ref 1.5–4)
LYMPHOCYTES RELATIVE PERCENT: 6.9 % (ref 20–42)
MAGNESIUM: 1.9 MG/DL (ref 1.6–2.6)
MCH RBC QN AUTO: 30.9 PG (ref 26–35)
MCHC RBC AUTO-ENTMCNC: 34.6 % (ref 32–34.5)
MCV RBC AUTO: 89.3 FL (ref 80–99.9)
MONOCYTES ABSOLUTE: 1.1 E9/L (ref 0.1–0.95)
MONOCYTES RELATIVE PERCENT: 9.6 % (ref 2–12)
NEUTROPHILS ABSOLUTE: 9.45 E9/L (ref 1.8–7.3)
NEUTROPHILS RELATIVE PERCENT: 82.4 % (ref 43–80)
PDW BLD-RTO: 14.6 FL (ref 11.5–15)
PHOSPHORUS: 2.8 MG/DL (ref 2.5–4.5)
PLATELET # BLD: 138 E9/L (ref 130–450)
PMV BLD AUTO: 10.6 FL (ref 7–12)
POTASSIUM SERPL-SCNC: 3.7 MMOL/L (ref 3.5–5)
RBC # BLD: 5.14 E12/L (ref 3.8–5.8)
SEDIMENTATION RATE, ERYTHROCYTE: 21 MM/HR (ref 0–15)
SODIUM BLD-SCNC: 134 MMOL/L (ref 132–146)
TOTAL PROTEIN: 5.9 G/DL (ref 6.4–8.3)
URINE CULTURE, ROUTINE: NORMAL
WBC # BLD: 11.5 E9/L (ref 4.5–11.5)

## 2021-04-09 PROCEDURE — 6360000002 HC RX W HCPCS: Performed by: SPECIALIST

## 2021-04-09 PROCEDURE — 6370000000 HC RX 637 (ALT 250 FOR IP): Performed by: INTERNAL MEDICINE

## 2021-04-09 PROCEDURE — 2700000000 HC OXYGEN THERAPY PER DAY

## 2021-04-09 PROCEDURE — 2580000003 HC RX 258: Performed by: SPECIALIST

## 2021-04-09 PROCEDURE — 94761 N-INVAS EAR/PLS OXIMETRY MLT: CPT

## 2021-04-09 PROCEDURE — 94660 CPAP INITIATION&MGMT: CPT

## 2021-04-09 PROCEDURE — 2060000000 HC ICU INTERMEDIATE R&B

## 2021-04-09 PROCEDURE — 80048 BASIC METABOLIC PNL TOTAL CA: CPT

## 2021-04-09 PROCEDURE — 6360000002 HC RX W HCPCS: Performed by: INTERNAL MEDICINE

## 2021-04-09 PROCEDURE — 36415 COLL VENOUS BLD VENIPUNCTURE: CPT

## 2021-04-09 PROCEDURE — 85025 COMPLETE CBC W/AUTO DIFF WBC: CPT

## 2021-04-09 PROCEDURE — 85651 RBC SED RATE NONAUTOMATED: CPT

## 2021-04-09 PROCEDURE — 80076 HEPATIC FUNCTION PANEL: CPT

## 2021-04-09 PROCEDURE — 84100 ASSAY OF PHOSPHORUS: CPT

## 2021-04-09 PROCEDURE — 83735 ASSAY OF MAGNESIUM: CPT

## 2021-04-09 PROCEDURE — 94640 AIRWAY INHALATION TREATMENT: CPT

## 2021-04-09 PROCEDURE — 6370000000 HC RX 637 (ALT 250 FOR IP): Performed by: SPECIALIST

## 2021-04-09 PROCEDURE — 86140 C-REACTIVE PROTEIN: CPT

## 2021-04-09 RX ORDER — SODIUM CHLORIDE 0.9 % (FLUSH) 0.9 %
5-40 SYRINGE (ML) INJECTION EVERY 12 HOURS SCHEDULED
Status: DISCONTINUED | OUTPATIENT
Start: 2021-04-09 | End: 2021-04-13 | Stop reason: HOSPADM

## 2021-04-09 RX ORDER — SODIUM CHLORIDE 9 MG/ML
25 INJECTION, SOLUTION INTRAVENOUS PRN
Status: DISCONTINUED | OUTPATIENT
Start: 2021-04-09 | End: 2021-04-13 | Stop reason: HOSPADM

## 2021-04-09 RX ORDER — SODIUM CHLORIDE 0.9 % (FLUSH) 0.9 %
5-40 SYRINGE (ML) INJECTION PRN
Status: DISCONTINUED | OUTPATIENT
Start: 2021-04-09 | End: 2021-04-13 | Stop reason: HOSPADM

## 2021-04-09 RX ORDER — HEPARIN SODIUM (PORCINE) LOCK FLUSH IV SOLN 100 UNIT/ML 100 UNIT/ML
3 SOLUTION INTRAVENOUS PRN
Status: DISCONTINUED | OUTPATIENT
Start: 2021-04-09 | End: 2021-04-13 | Stop reason: HOSPADM

## 2021-04-09 RX ORDER — LIDOCAINE HYDROCHLORIDE 10 MG/ML
5 INJECTION, SOLUTION EPIDURAL; INFILTRATION; INTRACAUDAL; PERINEURAL ONCE
Status: COMPLETED | OUTPATIENT
Start: 2021-04-09 | End: 2021-04-10

## 2021-04-09 RX ORDER — HEPARIN SODIUM (PORCINE) LOCK FLUSH IV SOLN 100 UNIT/ML 100 UNIT/ML
3 SOLUTION INTRAVENOUS EVERY 12 HOURS SCHEDULED
Status: DISCONTINUED | OUTPATIENT
Start: 2021-04-09 | End: 2021-04-13 | Stop reason: HOSPADM

## 2021-04-09 RX ADMIN — IPRATROPIUM BROMIDE AND ALBUTEROL SULFATE 1 AMPULE: .5; 3 SOLUTION RESPIRATORY (INHALATION) at 08:36

## 2021-04-09 RX ADMIN — IBUPROFEN 200 MG: 200 TABLET, FILM COATED ORAL at 12:24

## 2021-04-09 RX ADMIN — IPRATROPIUM BROMIDE AND ALBUTEROL SULFATE 1 AMPULE: .5; 3 SOLUTION RESPIRATORY (INHALATION) at 12:50

## 2021-04-09 RX ADMIN — BUDESONIDE 250 MCG: 0.25 SUSPENSION RESPIRATORY (INHALATION) at 20:15

## 2021-04-09 RX ADMIN — IPRATROPIUM BROMIDE AND ALBUTEROL SULFATE 1 AMPULE: .5; 3 SOLUTION RESPIRATORY (INHALATION) at 20:15

## 2021-04-09 RX ADMIN — IPRATROPIUM BROMIDE AND ALBUTEROL SULFATE 1 AMPULE: .5; 3 SOLUTION RESPIRATORY (INHALATION) at 16:51

## 2021-04-09 RX ADMIN — FAMOTIDINE 20 MG: 20 TABLET ORAL at 08:50

## 2021-04-09 RX ADMIN — ARFORMOTEROL TARTRATE 15 MCG: 15 SOLUTION RESPIRATORY (INHALATION) at 20:15

## 2021-04-09 RX ADMIN — ZINC SULFATE 220 MG (50 MG) CAPSULE 50 MG: CAPSULE at 08:50

## 2021-04-09 RX ADMIN — CIPROFLOXACIN HYDROCHLORIDE 750 MG: 750 TABLET, FILM COATED ORAL at 12:24

## 2021-04-09 RX ADMIN — ACETAMINOPHEN 650 MG: 325 TABLET ORAL at 00:38

## 2021-04-09 RX ADMIN — BUDESONIDE 250 MCG: 0.25 SUSPENSION RESPIRATORY (INHALATION) at 08:36

## 2021-04-09 RX ADMIN — MEROPENEM 1000 MG: 1 INJECTION, POWDER, FOR SOLUTION INTRAVENOUS at 00:33

## 2021-04-09 RX ADMIN — SODIUM CHLORIDE: 9 INJECTION, SOLUTION INTRAVENOUS at 04:00

## 2021-04-09 RX ADMIN — SODIUM CHLORIDE, PRESERVATIVE FREE 10 ML: 5 INJECTION INTRAVENOUS at 20:55

## 2021-04-09 RX ADMIN — ENOXAPARIN SODIUM 40 MG: 40 INJECTION SUBCUTANEOUS at 17:40

## 2021-04-09 RX ADMIN — ARFORMOTEROL TARTRATE 15 MCG: 15 SOLUTION RESPIRATORY (INHALATION) at 08:36

## 2021-04-09 RX ADMIN — CIPROFLOXACIN HYDROCHLORIDE 750 MG: 750 TABLET, FILM COATED ORAL at 00:33

## 2021-04-09 RX ADMIN — RIFAMPIN 600 MG: 300 CAPSULE ORAL at 08:49

## 2021-04-09 RX ADMIN — IBUPROFEN 200 MG: 200 TABLET, FILM COATED ORAL at 17:40

## 2021-04-09 RX ADMIN — Medication 9 MG: at 20:54

## 2021-04-09 RX ADMIN — MEROPENEM 1000 MG: 1 INJECTION, POWDER, FOR SOLUTION INTRAVENOUS at 17:40

## 2021-04-09 RX ADMIN — IBUPROFEN 200 MG: 200 TABLET, FILM COATED ORAL at 08:50

## 2021-04-09 RX ADMIN — ETHAMBUTOL HYDROCHLORIDE 800 MG: 400 TABLET ORAL at 08:50

## 2021-04-09 RX ADMIN — SODIUM CHLORIDE: 9 INJECTION, SOLUTION INTRAVENOUS at 20:54

## 2021-04-09 RX ADMIN — MEROPENEM 1000 MG: 1 INJECTION, POWDER, FOR SOLUTION INTRAVENOUS at 08:50

## 2021-04-09 RX ADMIN — TOBRAMYCIN 300 MG: 40 INJECTION INTRAMUSCULAR; INTRAVENOUS at 12:57

## 2021-04-09 RX ADMIN — TOBRAMYCIN 300 MG: 40 INJECTION INTRAMUSCULAR; INTRAVENOUS at 21:48

## 2021-04-09 ASSESSMENT — PAIN SCALES - GENERAL
PAINLEVEL_OUTOF10: 0
PAINLEVEL_OUTOF10: 2
PAINLEVEL_OUTOF10: 0
PAINLEVEL_OUTOF10: 0

## 2021-04-09 NOTE — PROGRESS NOTES
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 He states he is feeling better this AM. He is still experiencing slight shortness of breath and a productive cough. He admits to occasional chest pain not associated with exertion. REVIEW OF SYSTEMS:  General ROS: negative for - fatigue, malaise,  weight gain or weight loss  Psychological ROS: negative for - anxiety , depression  Ophthalmic ROS: negative for - decreased vision or visual distortion. ENT ROS: negative  Allergy and Immunology ROS: negative  Hematological and Lymphatic ROS: negative  Endocrine: no heat or cold intolerance and no polyphagia, polydipsia, or polyuria  Respiratory ROS: positive for - cough and shortness of breath  Cardiovascular ROS: positive for - chest pain. Gastrointestinal ROS: no abdominal pain, change in bowel habits, or black or bloody stools  Genito-Urinary ROS: no nocturia, dysuria, trouble voiding, frequency or hematuria  Musculoskeletal ROS: negative for- myalgias, arthralgias, or claudication  Neurological ROS: no TIA or stroke symptoms otherwise no significant change in symptoms or problems since yesterday as documented in previous progress notes.     SCHEDULED MEDICATIONS:   meropenem  1,000 mg Intravenous Q8H    tobramycin  300 mg Inhalation Q12H    ibuprofen  200 mg Oral TID WC    famotidine  20 mg Oral Daily    melatonin  9 mg Oral Nightly    zinc sulfate  50 mg Oral Daily    enoxaparin  40 mg Subcutaneous Daily    Arformoterol Tartrate  15 mcg Nebulization BID    budesonide  250 mcg Nebulization BID    ipratropium-albuterol  1 ampule Inhalation Q4H WA    rifAMPin  600 mg Oral Q48H    ethambutol  800 mg Oral Daily    ciprofloxacin  750 mg Oral 2 times per day       VITAL SIGNS:                                                                                                                          BP (!) 97/54   Pulse 62   Temp 98.9 °F (37.2 °C) (Oral)   Resp 22   Ht 6' 1\" (1.854 m)   Wt 206 lb (93.4 kg)   SpO2 92%   BMI 27.18 kg/m²   Patient Vitals for the past 96 hrs (Last 3 readings):   Weight   04/07/21 1014 206 lb (93.4 kg)     OBJECTIVE:    HEENT: PERRL, EOM  Intact; sclera non-icteric, conjunctiva pink. Carotids are brisk in upstroke with normal contour. No carotid bruits. Normal jugular venous pulsation at 45°. No palpable cervical nor supraclavicular nodes. Thyroid not palpable. Trachea midline. Chest: Even excursion  Lungs: CTA B, no expiratory wheezes or rhonchi, no decreased tactile fremitus without inspiratory rales. Heart: Regular  rhythm; S1 > S2, no gallop or murmur. No clicks, rub, palpable thrills   or heaves. PMI nondisplaced, 5th intercostal space MCL. Abdomen: Soft, nontender, nondistended,  grossly protuberant, no masses or organomegaly. Bowel sounds active. Extremities: Without clubbing, cyanosis or edema. Pulses present 3+ upper extermities bilaterally; present 2+ DP and present 1+ PT bilaterally.      Data:   Scheduled Meds: Reviewed  Continuous Infusions:    sodium chloride 33.3 mL/hr at 04/09/21 0400    sodium chloride 100 mL/hr at 04/07/21 1920       Intake/Output Summary (Last 24 hours) at 4/9/2021 1142  Last data filed at 4/9/2021 0850  Gross per 24 hour   Intake 420 ml   Output 1075 ml   Net -655 ml     CBC:   Recent Labs     04/07/21  1035 04/08/21  0230 04/09/21  0610 WBC 10.1 7.8 11.5   HGB 17.3* 13.6 15.9   HCT 51.1 40.9 45.9    131 138     BMP:  Recent Labs     04/07/21  1035 04/08/21  0230 04/09/21  0610    131* 134   K 3.7 3.6 3.7    102 97*   CO2 22 22 27   BUN 13 8 8   CREATININE 0.7 0.6* 0.8   LABGLOM >60 >60 >60     ABGs:   Lab Results   Component Value Date    PH 7.440 05/28/2019    PO2 59.1 05/28/2019    PCO2 32.1 05/28/2019     INR: No results for input(s): INR in the last 72 hours. PRO-BNP:   Lab Results   Component Value Date    PROBNP 1,132 (H) 04/08/2021    PROBNP 194 (H) 10/15/2020      TSH:   Lab Results   Component Value Date    TSH 1.840 04/08/2021      Cardiac Injury Profile:   Recent Labs     04/08/21  1250 04/08/21  1810   TROPONINI <0.01 <0.01      Lipid Profile:   Lab Results   Component Value Date    TRIG 49 04/08/2021    HDL 42 04/08/2021    LDLCALC 57 04/08/2021    CHOL 109 04/08/2021      Hemoglobin A1C: No components found for: HGBA1C     RAD:   Xr Chest (2 Vw)    Result Date: 4/8/2021  EXAMINATION: TWO XRAY VIEWS OF THE CHEST 4/8/2021 8:13 am COMPARISON: April 7, 2021 HISTORY: ORDERING SYSTEM PROVIDED HISTORY: pneumonia TECHNOLOGIST PROVIDED HISTORY: Reason for exam:->pneumonia FINDINGS: Worsening airspace disease at the left base likely representing pneumonia. Stable pneumonia or fibrosis at the right base. Pulmonary emphysema is also present. Worsening pneumonia on the left. Pneumonia and or fibrosis on the right. Emphysema. Xr Chest 1 View    Result Date: 4/7/2021  EXAMINATION: ONE XRAY VIEW OF THE CHEST 4/7/2021 10:51 am COMPARISON: 02/02/2021 HISTORY: ORDERING SYSTEM PROVIDED HISTORY: Cough SOB TECHNOLOGIST PROVIDED HISTORY: Latesha Lagos on 4/7/2021 10:52 AM 60 in AP erect portable Fatigue Shortness of Breath (thinks he has pneumonia again) Reason for exam:->Cough SOB FINDINGS: Large lung volumes may again reflect pulmonary emphysema. Normal cardiac silhouette size without definite vascular congestion.  Chronic

## 2021-04-09 NOTE — CONSULTS
Consulted to place PICC on this patient. Pt is not for discharge today and has IV access at this time. Floor staff notified that we will assess this patient tomorrow for PICC placement.   Jennifer Pennington RN, CPUI, [facility]

## 2021-04-09 NOTE — PROGRESS NOTES
PROGRESS  NOTE --                                                          INTERNAL  MEDICINE                                                                              I  PERSONALLY SAW , EXAMINED, AND CARED [address], 4/9/2021     LABS, XRAY ,CHART, AND MEDICATIONS  REVIEWED BY ME       Chief complaint: Cough, fever, shortness of breath      4/9/2021-SUBJECTIVE: Susan Mohr is alert awake and cooperative; oriented ×3. Denies any chest pain  nausea emesis. Tolerating diet. No abdominal pain. Dyspnea much improved chest discomfort nearly resolved. Wife is present through the exam. Highest temperature last 24 hours 100.4. Currently 98.9.  92% saturation 15 L high flow nasal cannula. Most recent blood pressure 97/54. Intake and output -1055 cc. Glucose 130. Troponins negative x3. CRP 18.5. Hemoglobin 15.9 WBC 11.5. Sed rate 21. Viral respiratory panel, SARS-CoV-2 all not detected. Legionella and strep antigens presumptive negative. Blood and urine cultures negative to date. Sputum Gram stain as follows--    Gram Stain Orderable 04/08/2021  5:30 PM  - [facility]   Group 5: >25 PMN's/LPF and <10 Epithelial cells/LPF   Moderate Polymorphonuclear leukocytes   Epithelial cells not seen   Few Gram positive cocci in pairs   Few Gram negative rods   Few Gram positive cocci in clusters      Chest x-ray from yesterday as follows--      FINDINGS:   Worsening airspace disease at the left base likely representing pneumonia. Stable pneumonia or fibrosis at the right base.  Pulmonary emphysema is also   present.       Impression:       Worsening pneumonia on the left.  Pneumonia and or fibrosis on the right. Emphysema       Social service note from yesterday appreciated. Patient wears 4 to 5 L while at home; no history of home health care and or skilled nursing facility.   Cardiology consult from yesterday appreciated. Cardiology believes chest discomfort not angina rather due to coughing and soreness of the chest.  Pulmonary note from yesterday, end-stage COPD; right-sided pleural scarring chronic; new bilateral infiltrates likely representing pneumonia, appear worse with hydration. History of Mycobacterium avium complex. ID note from yesterday, patient thinks he is having pleuritic left chest pain. Cefepime changed to meropenem; continue IV ciprofloxacin; start inhaled tobramycin, continue with ethambutol and rifampin, monitor labs. Nursing note from earlier this morning patient using bedside urinal with oxygen off, desaturated to the 70s. Nonrebreather applied since resumption of O2 only resulted in SPO2 of 80%. Patient very anxious because of dyspnea. Pulmonary was called; BiPAP placed, SPO2 95%. Patient early this a.m. requested off BiPAP; placed on 15 L high flow with saturation of 97%.       Objective:     PHYSICAL EXAM:    VS: BP (!) 97/54   Pulse 62   Temp 98.9 °F (37.2 °C) (Oral)   Resp 22   Ht 6' 1\" (1.854 m)   Wt 206 lb (93.4 kg)   SpO2 92%   BMI 27.18 kg/m²     Labs:   CBC:   Lab Results   Component Value Date    WBC 11.5 04/09/2021    RBC 5.14 04/09/2021    HGB 15.9 04/09/2021    HCT 45.9 04/09/2021    MCV 89.3 04/09/2021    MCH 30.9 04/09/2021    MCHC 34.6 04/09/2021    RDW 14.6 04/09/2021     04/09/2021    MPV 10.6 04/09/2021     CBC with Differential:    Lab Results   Component Value Date    WBC 11.5 04/09/2021    RBC 5.14 04/09/2021    HGB 15.9 04/09/2021    HCT 45.9 04/09/2021     04/09/2021    MCV 89.3 04/09/2021    MCH 30.9 04/09/2021    MCHC 34.6 04/09/2021    RDW 14.6 04/09/2021    NRBC 0.0 04/07/2021    LYMPHOPCT 6.9 04/09/2021    MONOPCT 9.6 04/09/2021    MYELOPCT 0.9 04/07/2021    BASOPCT 0.3 04/09/2021    MONOSABS 1.10 04/09/2021    LYMPHSABS 0.79 04/09/2021    EOSABS 0.01 04/09/2021    BASOSABS 0.04 04/09/2021     Hemoglobin/Hematocrit:    Lab Results Value Date    COLORU Yellow 04/07/2021    PROTEINU TRACE 04/07/2021    PHUR 5.5 04/07/2021    LABCAST FEW 10/02/2019    WBCUA NONE 04/07/2021    RBCUA NONE 04/07/2021    MUCUS Present 04/07/2021    BACTERIA FEW 04/07/2021    CLARITYU Clear 04/07/2021    SPECGRAV >=1.030 04/07/2021    LEUKOCYTESUR Negative 04/07/2021    UROBILINOGEN 0.2 04/07/2021    BILIRUBINUR MODERATE 04/07/2021    BLOODU TRACE-LYSED 04/07/2021    GLUCOSEU Negative 04/07/2021     HgBA1c:    Lab Results   Component Value Date    LABA1C 5.1 04/08/2021     FLP:    Lab Results   Component Value Date    TRIG 49 04/08/2021    HDL 42 04/08/2021    LDLCALC 57 04/08/2021    LABVLDL 10 04/08/2021     TSH:    Lab Results   Component Value Date    TSH 1.840 04/08/2021     VITAMIN B12: No components found for: B12  FOLATE:    Lab Results   Component Value Date    FOLATE 6.8 04/08/2021        General appearance: Alert, Awake, Oriented times 3, no distress; high flow nasal cannula oxygen in place  Skin: Warm and dry ; no rashes  Head: Normocephalic. No masses, lesions or tenderness noted  Eyes: Conjunctivae pink, sclera white. PERRL,EOM-I  Ears: External ears normal  Nose/Sinuses: Nares normal. Septum midline. Mucosa normal. No drainage  Oropharynx: Oropharynx clear with no exudate seen  Neck: Supple. No jugular venous distension, lymphadenopathy or thyromegaly Trachea midline  Lungs: Mostly clear, rare rhonchi  Heart: S1 S2  Regular rate and rhythm. No rub, murmur or gallop  Abdomen: Soft, non-tender. BS normal. No masses, organomegaly; no rebound or guarding  Extremities: No edema, Peripheral pulses palpable  Musculoskeletal: Muscular strength appears intact. Neuro:  No focal motor defects ; II-XII grossly intact .  GLASER equally    TELEMETRY: REVIEWED--Telemetry: Sinus, occasional PVCs and sinus tachycardia    ASSESSMENT:   Principal Problem:    Pneumonia  Active Problems:    COPD (chronic obstructive pulmonary disease) (HCC)    Bullous emphysema (HCC) Intake/Output Summary (Last 24 hours) at 4/9/2021 1026  Last data filed at 4/9/2021 0642  Gross per 24 hour   Intake 420 ml   Output 1475 ml   Net -1055 ml       Wt Readings from Last 3 Encounters:   04/07/21 206 lb (93.4 kg)   03/15/21 195 lb (88.5 kg)   01/26/21 195 lb (88.5 kg)       Labs: Additional    GLUCOSE:No results for input(s): POCGLU in the last 72 hours. BNP:No results found for: BNP    CRP:   Recent Labs     04/07/21  1047 04/08/21  0230 04/09/21  0610   CRP 7.7* 9.2* 18.5*       ESR:  Recent Labs     04/07/21  1035 04/08/21  0230 04/09/21  0610   SEDRATE 1 10 21*       RADIOLOGY: REVIEWED AVAILABLE REPORT  XR CHEST (2 VW)   Final Result   Worsening pneumonia on the left. Pneumonia and or fibrosis on the right. Emphysema. XR CHEST 1 VIEW   Final Result   New ground-glass opacity in the left lung base, in the region of previous   chronic interstitial and alveolar density, may reflect superimposed new   atelectasis and/or pneumonia.                    [address]   10:26 AM     4/9/2021      Voice recognition software used for dictation

## 2021-04-09 NOTE — CARE COORDINATION
Social Work / Discharge Planning : Patient currently on 15 liters of high flow. SW attempted to meet with patient but  [facility] going into room. Wife present and stated he had a rough nite. ID on board and currently on IV antibiotic and inhaled tobramycin. Goal is home. Patient wears 4 to 5 liters of 02 baseline through [facility]. Awaiting therapy input to better assist with needs at discharge. SW to follow .  Electronically signed by MALVIN Thorne on 4/9/21 at 12:17 PM EDT

## 2021-04-09 NOTE — PROGRESS NOTES
Ghazala Pearl on 4/8/2021 12:20 PM      Electronically signed by:  Keerthi Blanc DO 4/9/2021 11:50 AM

## 2021-04-09 NOTE — PROGRESS NOTES
[address] [place] [address] [facility]  [facility]  Progress Note    SUBJECTIVE:  Chief Complaint   Patient presents with    Fatigue    Shortness of Breath     thinks he has pneumonia again     Says he's feeling better this morning, he used the BiPap last night & was able to get some sleep. Still with cough, minimally productive. Low grade temps - tmax 100.4  Tolerating medications     Review of systems:  As stated above in the chief complaint, otherwise negative. Medications:  Scheduled Meds:   meropenem  1,000 mg Intravenous Q8H    tobramycin  300 mg Inhalation Q12H    ibuprofen  200 mg Oral TID WC    famotidine  20 mg Oral Daily    melatonin  9 mg Oral Nightly    zinc sulfate  50 mg Oral Daily    enoxaparin  40 mg Subcutaneous Daily    Arformoterol Tartrate  15 mcg Nebulization BID    budesonide  250 mcg Nebulization BID    ipratropium-albuterol  1 ampule Inhalation Q4H WA    rifAMPin  600 mg Oral Q48H    ethambutol  800 mg Oral Daily    ciprofloxacin  750 mg Oral 2 times per day     Continuous Infusions:   sodium chloride 33.3 mL/hr at 21 0400    sodium chloride 100 mL/hr at 21 1920     PRN Meds:trimethobenzamide, nitroGLYCERIN, morphine, potassium chloride **OR** potassium alternative oral replacement **OR** potassium chloride, acetaminophen, oxyCODONE-acetaminophen **AND** oxyCODONE    OBJECTIVE:  BP (!) 97/54   Pulse 62   Temp 98.9 °F (37.2 °C) (Oral)   Resp 22   Ht 6' 1\" (1.854 m)   Wt 206 lb (93.4 kg)   SpO2 92%   BMI 27.18 kg/m²   Temp  Av.2 °F (37.3 °C)  Min: 98.8 °F (37.1 °C)  Max: 100.4 °F (38 °C)  Constitutional: The patient is lying in bed. Awake, alert. Some weakness. Skin: Warm and dry. No rashes were noted. HEENT: Round and reactive pupils. Moist mucous membranes. No ulcerations or thrush. Neck: Supple to movements. Chest: No use of accessory muscles to breathe. Symmetrical expansion. Crackles over the left base.  15LNC - mild dyspnea with cultures  · Fever secondary to pneumonia    PLAN:  · Continue Meropenem & inhaled Tobramycin  · Continue PO Ciprofloxacin & Rifampin   · Continue Albuterol   · Monitor labs    Pete Washington  11:20 AM  4/9/2021    Patient seen and examined. I had a face to face encounter with the patient. Agree with exam.  Assessment and plan as outlined above and directed by me. Addition and corrections were done as deemed appropriate. My exam and plan include: The patient had a better night. He is doing better on BiPAP. Cultures are not showing any significant organisms so far. My suspicion is that he will have to be on IV antibiotics for the duration of treatment. I will go ahead and order a PICC. Informed Consent for PICC:  I explained the risks, benefits, alternative options, and potential complications associated with insertion of Peripherally Inserted Central Catheter (PICC) with the patient prior to the procedure.     Electronically signed by Cezar Linares MD on 4/9/2021 at 2:58 PM     Victory Hunt  4/9/2021  2:58 PM

## 2021-04-09 NOTE — PROGRESS NOTES
Occupational Therapy    OT hold this date d/t breathing status, continues to require high level of O2/bipap use and dropping with minimal activity. Will continue to follow. Thank you.     Dari Gutierrez OTR/L  [id number]

## 2021-04-09 NOTE — PLAN OF CARE
Problem: Falls - Risk of:  Goal: Will remain free from falls  Description: Will remain free from falls  4/9/2021 1035 by Vaishnavi Mckeon RN  Outcome: Met This Shift  4/9/2021 0217 by Nik Lala RN  Outcome: Met This Shift  Goal: Absence of physical injury  Description: Absence of physical injury  Outcome: Met This Shift     Problem: Health Behavior:  Goal: Ability to identify and utilize available resources and services will improve  Description: Ability to identify and utilize available resources and services will improve  Outcome: Met This Shift     Problem: Role Relationship:  Goal: Ability to interact with others will improve  Description: Ability to interact with others will improve  Outcome: Met This Shift     Problem: Breathing Pattern - Ineffective:  Goal: Ability to achieve and maintain a regular respiratory rate will improve  Description: Ability to achieve and maintain a regular respiratory rate will improve  4/9/2021 1035 by Vaishnavi Mckeon RN  Outcome: Met This Shift  4/9/2021 0218 by Nik Lala RN  Outcome: Ongoing

## 2021-04-09 NOTE — PROGRESS NOTES
hours. Pathology:  1. N/A      Microbiology:  1. None    Recent ABG:   No results for input(s): PH, PO2, PCO2, HCO3, BE, O2SAT, METHB, O2HB, COHB, O2CON, HHB, THB in the last 72 hours. FiO2 : 50 %       Recent Films:  XR CHEST (2 VW)   Final Result   Worsening pneumonia on the left. Pneumonia and or fibrosis on the right. Emphysema. XR CHEST 1 VIEW   Final Result   New ground-glass opacity in the left lung base, in the region of previous   chronic interstitial and alveolar density, may reflect superimposed new   atelectasis and/or pneumonia. Assessment:  1. End-stage chronic obstructive pulmonary disease  2. Right-sided pleural scarring, chronic  3. New bilateral infiltrates likely representing pneumonia, with hydration, they appear worse. 4. History of Mycobacterium avium complex disease        Plan:  1. Empiric aerosol treatments  2. Supplemental oxygen, titrate as needed  3. Antimicrobials per infectious disease, changes noted. Time at the bedside, reviewing labs and radiographs, reviewing updated notes and consultations, discussing with staff and family was more than 35 minutes. Please note that voice recognition technology was used in the preparation of this note and make therefore it may contain inadvertent transcription errors. If the patient is a COVID 19 isolation patient, the above physical exam reflects that of the examining physician for the day. Britney López M.D., F.C.C.P.     [facility] [address], [address] [place] [address]

## 2021-04-09 NOTE — PROGRESS NOTES
Patient transferring from 6S room 648 to 4W room 404, report called to [facility], placed on telepack 404, patient belongings consisting of pants, shirt, shoes, undergarments, glasses, and cell phone transported with patient. Patient reports he wishes to notify family of new room assignment himself.

## 2021-04-09 NOTE — PROGRESS NOTES
Patient sitting at bedside using urinal with O2 off and desated and O2 sat 70%. O2 reapplied and patient still at 80%. Non-re breather applied and sat still 89-90% and patient still in distress. Called Dr. Yana Gonzalez and new order for BiPAP. Called respiratory and BiPAP applied. O2 sat 95% at this time and patient resting comfortably. Hourly rounding continues.

## 2021-04-10 LAB
ALBUMIN SERPL-MCNC: 3.1 G/DL (ref 3.5–5.2)
ALP BLD-CCNC: 72 U/L (ref 40–129)
ALT SERPL-CCNC: 11 U/L (ref 0–40)
ANION GAP SERPL CALCULATED.3IONS-SCNC: 12 MMOL/L (ref 7–16)
AST SERPL-CCNC: 18 U/L (ref 0–39)
BASOPHILS ABSOLUTE: 0.02 E9/L (ref 0–0.2)
BASOPHILS RELATIVE PERCENT: 0.2 % (ref 0–2)
BILIRUB SERPL-MCNC: 0.7 MG/DL (ref 0–1.2)
BILIRUBIN DIRECT: 0.3 MG/DL (ref 0–0.3)
BILIRUBIN, INDIRECT: 0.4 MG/DL (ref 0–1)
BUN BLDV-MCNC: 8 MG/DL (ref 8–23)
C-REACTIVE PROTEIN: 16.3 MG/DL (ref 0–0.4)
CALCIUM SERPL-MCNC: 9 MG/DL (ref 8.6–10.2)
CHLORIDE BLD-SCNC: 97 MMOL/L (ref 98–107)
CO2: 25 MMOL/L (ref 22–29)
CREAT SERPL-MCNC: 0.6 MG/DL (ref 0.7–1.2)
CULTURE, RESPIRATORY: NORMAL
EOSINOPHILS ABSOLUTE: 0.05 E9/L (ref 0.05–0.5)
EOSINOPHILS RELATIVE PERCENT: 0.5 % (ref 0–6)
GFR AFRICAN AMERICAN: >60
GFR NON-AFRICAN AMERICAN: >60 ML/MIN/1.73
GLUCOSE BLD-MCNC: 113 MG/DL (ref 74–99)
HCT VFR BLD CALC: 45.9 % (ref 37–54)
HEMOGLOBIN: 15.4 G/DL (ref 12.5–16.5)
IMMATURE GRANULOCYTES #: 0.05 E9/L
IMMATURE GRANULOCYTES %: 0.5 % (ref 0–5)
LYMPHOCYTES ABSOLUTE: 0.65 E9/L (ref 1.5–4)
LYMPHOCYTES RELATIVE PERCENT: 6.4 % (ref 20–42)
MAGNESIUM: 1.7 MG/DL (ref 1.6–2.6)
MCH RBC QN AUTO: 30.1 PG (ref 26–35)
MCHC RBC AUTO-ENTMCNC: 33.6 % (ref 32–34.5)
MCV RBC AUTO: 89.8 FL (ref 80–99.9)
MONOCYTES ABSOLUTE: 0.7 E9/L (ref 0.1–0.95)
MONOCYTES RELATIVE PERCENT: 6.9 % (ref 2–12)
NEUTROPHILS ABSOLUTE: 8.7 E9/L (ref 1.8–7.3)
NEUTROPHILS RELATIVE PERCENT: 85.5 % (ref 43–80)
PDW BLD-RTO: 14.2 FL (ref 11.5–15)
PHOSPHORUS: 1.2 MG/DL (ref 2.5–4.5)
PLATELET # BLD: 147 E9/L (ref 130–450)
PMV BLD AUTO: 11.1 FL (ref 7–12)
POTASSIUM SERPL-SCNC: 3.4 MMOL/L (ref 3.5–5)
RBC # BLD: 5.11 E12/L (ref 3.8–5.8)
SEDIMENTATION RATE, ERYTHROCYTE: 24 MM/HR (ref 0–15)
SMEAR, RESPIRATORY: NORMAL
SODIUM BLD-SCNC: 134 MMOL/L (ref 132–146)
TOTAL PROTEIN: 6.2 G/DL (ref 6.4–8.3)
WBC # BLD: 10.2 E9/L (ref 4.5–11.5)

## 2021-04-10 PROCEDURE — 84100 ASSAY OF PHOSPHORUS: CPT

## 2021-04-10 PROCEDURE — 2580000003 HC RX 258: Performed by: INTERNAL MEDICINE

## 2021-04-10 PROCEDURE — 6370000000 HC RX 637 (ALT 250 FOR IP): Performed by: SPECIALIST

## 2021-04-10 PROCEDURE — 36569 INSJ PICC 5 YR+ W/O IMAGING: CPT

## 2021-04-10 PROCEDURE — 94761 N-INVAS EAR/PLS OXIMETRY MLT: CPT

## 2021-04-10 PROCEDURE — 2700000000 HC OXYGEN THERAPY PER DAY

## 2021-04-10 PROCEDURE — 94660 CPAP INITIATION&MGMT: CPT

## 2021-04-10 PROCEDURE — C1751 CATH, INF, PER/CENT/MIDLINE: HCPCS

## 2021-04-10 PROCEDURE — 2500000003 HC RX 250 WO HCPCS: Performed by: SPECIALIST

## 2021-04-10 PROCEDURE — 36415 COLL VENOUS BLD VENIPUNCTURE: CPT

## 2021-04-10 PROCEDURE — 2500000003 HC RX 250 WO HCPCS: Performed by: INTERNAL MEDICINE

## 2021-04-10 PROCEDURE — 85651 RBC SED RATE NONAUTOMATED: CPT

## 2021-04-10 PROCEDURE — 94640 AIRWAY INHALATION TREATMENT: CPT

## 2021-04-10 PROCEDURE — 86140 C-REACTIVE PROTEIN: CPT

## 2021-04-10 PROCEDURE — 2060000000 HC ICU INTERMEDIATE R&B

## 2021-04-10 PROCEDURE — 76937 US GUIDE VASCULAR ACCESS: CPT

## 2021-04-10 PROCEDURE — 6360000002 HC RX W HCPCS: Performed by: INTERNAL MEDICINE

## 2021-04-10 PROCEDURE — 83735 ASSAY OF MAGNESIUM: CPT

## 2021-04-10 PROCEDURE — 6360000002 HC RX W HCPCS: Performed by: SPECIALIST

## 2021-04-10 PROCEDURE — 02HV33Z INSERTION OF INFUSION DEVICE INTO SUPERIOR VENA CAVA, PERCUTANEOUS APPROACH: ICD-10-PCS | Performed by: INTERNAL MEDICINE

## 2021-04-10 PROCEDURE — 80076 HEPATIC FUNCTION PANEL: CPT

## 2021-04-10 PROCEDURE — 85025 COMPLETE CBC W/AUTO DIFF WBC: CPT

## 2021-04-10 PROCEDURE — 6370000000 HC RX 637 (ALT 250 FOR IP): Performed by: INTERNAL MEDICINE

## 2021-04-10 PROCEDURE — 80048 BASIC METABOLIC PNL TOTAL CA: CPT

## 2021-04-10 PROCEDURE — 2580000003 HC RX 258: Performed by: SPECIALIST

## 2021-04-10 RX ADMIN — IPRATROPIUM BROMIDE AND ALBUTEROL SULFATE 1 AMPULE: .5; 3 SOLUTION RESPIRATORY (INHALATION) at 12:36

## 2021-04-10 RX ADMIN — POTASSIUM PHOSPHATE, MONOBASIC AND POTASSIUM PHOSPHATE, DIBASIC 20 MMOL: 224; 236 INJECTION, SOLUTION, CONCENTRATE INTRAVENOUS at 15:46

## 2021-04-10 RX ADMIN — ZINC SULFATE 220 MG (50 MG) CAPSULE 50 MG: CAPSULE at 09:29

## 2021-04-10 RX ADMIN — CIPROFLOXACIN HYDROCHLORIDE 750 MG: 750 TABLET, FILM COATED ORAL at 12:40

## 2021-04-10 RX ADMIN — ARFORMOTEROL TARTRATE 15 MCG: 15 SOLUTION RESPIRATORY (INHALATION) at 09:41

## 2021-04-10 RX ADMIN — MEROPENEM 1000 MG: 1 INJECTION, POWDER, FOR SOLUTION INTRAVENOUS at 09:26

## 2021-04-10 RX ADMIN — ENOXAPARIN SODIUM 40 MG: 40 INJECTION SUBCUTANEOUS at 18:38

## 2021-04-10 RX ADMIN — SODIUM CHLORIDE: 9 INJECTION, SOLUTION INTRAVENOUS at 18:36

## 2021-04-10 RX ADMIN — BUDESONIDE 250 MCG: 0.25 SUSPENSION RESPIRATORY (INHALATION) at 19:15

## 2021-04-10 RX ADMIN — IPRATROPIUM BROMIDE AND ALBUTEROL SULFATE 1 AMPULE: .5; 3 SOLUTION RESPIRATORY (INHALATION) at 19:15

## 2021-04-10 RX ADMIN — SODIUM CHLORIDE: 9 INJECTION, SOLUTION INTRAVENOUS at 12:07

## 2021-04-10 RX ADMIN — IBUPROFEN 200 MG: 200 TABLET, FILM COATED ORAL at 09:29

## 2021-04-10 RX ADMIN — BUDESONIDE 250 MCG: 0.25 SUSPENSION RESPIRATORY (INHALATION) at 09:41

## 2021-04-10 RX ADMIN — TOBRAMYCIN 300 MG: 40 INJECTION INTRAMUSCULAR; INTRAVENOUS at 10:15

## 2021-04-10 RX ADMIN — SODIUM CHLORIDE, PRESERVATIVE FREE 10 ML: 5 INJECTION INTRAVENOUS at 09:29

## 2021-04-10 RX ADMIN — TOBRAMYCIN 300 MG: 40 INJECTION INTRAMUSCULAR; INTRAVENOUS at 19:16

## 2021-04-10 RX ADMIN — MEROPENEM 1000 MG: 1 INJECTION, POWDER, FOR SOLUTION INTRAVENOUS at 15:46

## 2021-04-10 RX ADMIN — IBUPROFEN 200 MG: 200 TABLET, FILM COATED ORAL at 18:34

## 2021-04-10 RX ADMIN — MEROPENEM 1000 MG: 1 INJECTION, POWDER, FOR SOLUTION INTRAVENOUS at 01:34

## 2021-04-10 RX ADMIN — CIPROFLOXACIN HYDROCHLORIDE 750 MG: 750 TABLET, FILM COATED ORAL at 01:34

## 2021-04-10 RX ADMIN — SODIUM CHLORIDE: 9 INJECTION, SOLUTION INTRAVENOUS at 01:35

## 2021-04-10 RX ADMIN — IPRATROPIUM BROMIDE AND ALBUTEROL SULFATE 1 AMPULE: .5; 3 SOLUTION RESPIRATORY (INHALATION) at 16:43

## 2021-04-10 RX ADMIN — ARFORMOTEROL TARTRATE 15 MCG: 15 SOLUTION RESPIRATORY (INHALATION) at 19:15

## 2021-04-10 RX ADMIN — SODIUM CHLORIDE: 9 INJECTION, SOLUTION INTRAVENOUS at 12:30

## 2021-04-10 RX ADMIN — SODIUM CHLORIDE, PRESERVATIVE FREE 5 ML: 5 INJECTION INTRAVENOUS at 22:13

## 2021-04-10 RX ADMIN — POTASSIUM CHLORIDE 40 MEQ: 1500 TABLET, EXTENDED RELEASE ORAL at 05:27

## 2021-04-10 RX ADMIN — IPRATROPIUM BROMIDE AND ALBUTEROL SULFATE 1 AMPULE: .5; 3 SOLUTION RESPIRATORY (INHALATION) at 09:41

## 2021-04-10 RX ADMIN — Medication 9 MG: at 22:12

## 2021-04-10 RX ADMIN — IBUPROFEN 200 MG: 200 TABLET, FILM COATED ORAL at 12:40

## 2021-04-10 RX ADMIN — LIDOCAINE HYDROCHLORIDE 2 ML: 10 INJECTION, SOLUTION EPIDURAL; INFILTRATION; INTRACAUDAL; PERINEURAL at 17:40

## 2021-04-10 RX ADMIN — ACETAMINOPHEN 650 MG: 325 TABLET ORAL at 03:37

## 2021-04-10 RX ADMIN — SODIUM CHLORIDE: 9 INJECTION, SOLUTION INTRAVENOUS at 04:24

## 2021-04-10 RX ADMIN — FAMOTIDINE 20 MG: 20 TABLET ORAL at 09:29

## 2021-04-10 RX ADMIN — ETHAMBUTOL HYDROCHLORIDE 800 MG: 400 TABLET ORAL at 09:29

## 2021-04-10 ASSESSMENT — PAIN SCALES - GENERAL
PAINLEVEL_OUTOF10: 2
PAINLEVEL_OUTOF10: 4

## 2021-04-10 ASSESSMENT — PAIN DESCRIPTION - ORIENTATION: ORIENTATION: LEFT;LOWER

## 2021-04-10 ASSESSMENT — PAIN DESCRIPTION - DESCRIPTORS: DESCRIPTORS: SHARP

## 2021-04-10 ASSESSMENT — PAIN - FUNCTIONAL ASSESSMENT: PAIN_FUNCTIONAL_ASSESSMENT: PREVENTS OR INTERFERES SOME ACTIVE ACTIVITIES AND ADLS

## 2021-04-10 ASSESSMENT — PAIN DESCRIPTION - PROGRESSION: CLINICAL_PROGRESSION: NOT CHANGED

## 2021-04-10 NOTE — PROGRESS NOTES
[address] [place] [address] [facility]  Progress Note    SUBJECTIVE:  Chief Complaint   Patient presents with    Fatigue    Shortness of Breath     thinks he has pneumonia again     The patient was transferred to a monitored bed. He says he is not feeling better yet. He still has a cough with a minimally productive sputum. He was still complaining of dyspnea. Review of systems:  As stated above in the chief complaint, otherwise negative. Medications:  Scheduled Meds:   lidocaine PF  5 mL Intradermal Once    sodium chloride flush  5-40 mL Intravenous 2 times per day    heparin flush  3 mL Intravenous 2 times per day    meropenem  1,000 mg Intravenous Q8H    tobramycin  300 mg Inhalation Q12H    ibuprofen  200 mg Oral TID WC    famotidine  20 mg Oral Daily    melatonin  9 mg Oral Nightly    zinc sulfate  50 mg Oral Daily    enoxaparin  40 mg Subcutaneous Daily    Arformoterol Tartrate  15 mcg Nebulization BID    budesonide  250 mcg Nebulization BID    ipratropium-albuterol  1 ampule Inhalation Q4H WA    rifAMPin  600 mg Oral Q48H    ethambutol  800 mg Oral Daily    ciprofloxacin  750 mg Oral 2 times per day     Continuous Infusions:   sodium chloride      sodium chloride 33.3 mL/hr at 04/10/21 0424    sodium chloride 100 mL/hr at 04/10/21 0135     PRN Meds:sodium chloride flush, sodium chloride, heparin flush, trimethobenzamide, nitroGLYCERIN, morphine, potassium chloride **OR** potassium alternative oral replacement **OR** potassium chloride, acetaminophen, oxyCODONE-acetaminophen **AND** oxyCODONE    OBJECTIVE:  /76   Pulse 109   Temp 99.9 °F (37.7 °C) (Oral)   Resp 26   Ht 6' 1\" (1.854 m)   Wt 205 lb (93 kg)   SpO2 93%   BMI 27.05 kg/m²   Temp  Av °F (37.2 °C)  Min: 97.9 °F (36.6 °C)  Max: 99.9 °F (37.7 °C)  Constitutional: The patient is sitting up in bed eating lunch. Awake and alert. Skin: Warm and dry. No rashes were noted.    HEENT: Round and reactive pupils. Moist mucous membranes. No ulcerations or thrush. Neck: Supple to movements. Chest: No use of accessory muscles to breathe. Symmetrical expansion. Crackles over the left base. 15LNC - mild dyspnea with exertion/cough   Cardiovascular: Heart sounds rhythmic and regular. Abdomen: Positive bowel sounds to auscultation. Benign to palpation. Extremities: No edema.   Lines: peripheral    Laboratory and Tests Review:  Lab Results   Component Value Date    WBC 10.2 04/10/2021    WBC 11.5 04/09/2021    WBC 7.8 04/08/2021    HGB 15.4 04/10/2021    HCT 45.9 04/10/2021    MCV 89.8 04/10/2021     04/10/2021     Lab Results   Component Value Date    NEUTROABS 8.70 (H) 04/10/2021    NEUTROABS 9.45 (H) 04/09/2021    NEUTROABS 6.46 04/08/2021     No results found for: CRPHS  Lab Results   Component Value Date    ALT 11 04/10/2021    AST 18 04/10/2021    ALKPHOS 72 04/10/2021    BILITOT 0.7 04/10/2021     Lab Results   Component Value Date     04/10/2021    K 3.4 04/10/2021    K 3.7 04/07/2021    CL 97 04/10/2021    CO2 25 04/10/2021    BUN 8 04/10/2021    CREATININE 0.6 04/10/2021    CREATININE 0.8 04/09/2021    CREATININE 0.6 04/08/2021    GFRAA >60 04/10/2021    LABGLOM >60 04/10/2021    GLUCOSE 113 04/10/2021    PROT 6.2 04/10/2021    LABALBU 3.1 04/10/2021    CALCIUM 9.0 04/10/2021    BILITOT 0.7 04/10/2021    ALKPHOS 72 04/10/2021    AST 18 04/10/2021    ALT 11 04/10/2021     Lab Results   Component Value Date    CRP 16.3 (H) 04/10/2021    CRP 18.5 (H) 04/09/2021    CRP 9.2 (H) 04/08/2021     Lab Results   Component Value Date    SEDRATE 24 (H) 04/10/2021    SEDRATE 21 (H) 04/09/2021    SEDRATE 10 04/08/2021     Radiology:  Chest x-ray- worsening pneumonia on the left     Microbiology:   Sputum gram stain 4/8/2021: few GPC in pairs & clusters, few GNR  Blood cultures 4/7/2021: negative so far  Strep/Legionella urine antigens 4/7/2021: negative  Respiratory Panel with SARS-CoV-2: not detected Recent Labs     04/07/21  1047   PROCAL 0.19*       ASSESSMENT:  · Left lower lobe pneumonia/HCAP  · 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   I think the patient may need a bronchoscopy    Juan Bee  10:28 AM  4/10/2021

## 2021-04-10 NOTE — PROGRESS NOTES
[facility]  [address], [address]  [facility], [facility]      Pulmonary Progress Note      SUBJECTIVE:  Similar with respiratory function, cough with minimal reddish sputum production. On 15 li HFNC, used NIPPV for only a few hrs as claims something was wrong with it last night (felt too strong). Ambulating in room and gets tired out quickly.     OBJECTIVE    Medications    Continuous Infusions:   sodium chloride      sodium chloride 33.3 mL/hr at 04/10/21 0424    sodium chloride 100 mL/hr at 04/10/21 0135       Scheduled Meds:   lidocaine PF  5 mL Intradermal Once    sodium chloride flush  5-40 mL Intravenous 2 times per day    heparin flush  3 mL Intravenous 2 times per day    meropenem  1,000 mg Intravenous Q8H    tobramycin  300 mg Inhalation Q12H    ibuprofen  200 mg Oral TID WC    famotidine  20 mg Oral Daily    melatonin  9 mg Oral Nightly    zinc sulfate  50 mg Oral Daily    enoxaparin  40 mg Subcutaneous Daily    Arformoterol Tartrate  15 mcg Nebulization BID    budesonide  250 mcg Nebulization BID    ipratropium-albuterol  1 ampule Inhalation Q4H WA    rifAMPin  600 mg Oral Q48H    ethambutol  800 mg Oral Daily    ciprofloxacin  750 mg Oral 2 times per day       PRN Meds:sodium chloride flush, sodium chloride, heparin flush, trimethobenzamide, nitroGLYCERIN, morphine, potassium chloride **OR** potassium alternative oral replacement **OR** potassium chloride, acetaminophen, oxyCODONE-acetaminophen **AND** oxyCODONE    Physical    VITALS:  /80   Pulse 95   Temp 99.4 °F (37.4 °C) (Oral)   Resp 19   Ht 6' 1\" (1.854 m)   Wt 205 lb (93 kg)   SpO2 90%   BMI 27.05 kg/m²     24HR INTAKE/OUTPUT:      Intake/Output Summary (Last 24 hours) at 4/10/2021 0909  Last data filed at 4/10/2021 0530  Gross per 24 hour   Intake 190 ml   Output 725 ml   Net -535 ml       24HR PULSE OXIMETRY RANGE:    SpO2  Av.6 %  Min: 87 %  Max: 97 %    General appearance: alert, appears stated age and cooperative  Lungs: rhonchi bilaterally worse with cough  Heart: regular rate and rhythm, S1, S2 normal, no murmur, click, rub or gallop  Abdomen: soft, non-tender; bowel sounds normal; no masses,  no organomegaly  Extremities: extremities normal, atraumatic, no cyanosis or edema  Neurologic: Mental status: Alert, oriented, thought content appropriate    Data    CBC:   Recent Labs     04/08/21 0230 04/09/21  0610 04/10/21  0249   WBC 7.8 11.5 10.2   HGB 13.6 15.9 15.4   HCT 40.9 45.9 45.9   MCV 90.5 89.3 89.8    138 147       BMP:  Recent Labs     04/08/21 0230 04/09/21  0610 04/10/21  0249   * 134 134   K 3.6 3.7 3.4*    97* 97*   CO2 22 27 25   PHOS 2.0* 2.8 1.2*   BUN 8 8 8   CREATININE 0.6* 0.8 0.6*    ALB:3,BILIDIR:3,BILITOT:3,ALKPHOS:3)@    PT/INR: No results for input(s): PROTIME, INR in the last 72 hours. ABG:   No results for input(s): PH, PO2, PCO2, HCO3, BE, O2SAT, METHB, O2HB, COHB, O2CON, HHB, THB in the last 72 hours. 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 Cont with nebs, observe respiratory function and cough  2. Cont with oxygen, taper as tolerated, saturating about 91-93%  3. Reiterated need to use NIPPV at night  4. Antibiotics as per ID  5.  OOB to chair, ambulate with assistance, PT/OT      Time at the bedside, reviewing labs and radiographs, reviewing notes and consultations, discussing with staff and family was more than 35 minutes. Thanks for letting us see this patient in consultation. Please contact us with any questions. Office [phone] or after hours through [employer], x [phone].

## 2021-04-10 NOTE — PROGRESS NOTES
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 He continues to experience shortness of breath and a productive cough. He admits to occasional chest pain not associated with exertion but rather with breathing and coughing. Maurice Young REVIEW OF SYSTEMS:  General ROS: negative for - fatigue, malaise,  weight gain or weight loss  Psychological ROS: negative for - anxiety , depression  Ophthalmic ROS: negative for - decreased vision or visual distortion. ENT ROS: negative  Allergy and Immunology ROS: negative  Hematological and Lymphatic ROS: negative  Endocrine: no heat or cold intolerance and no polyphagia, polydipsia, or polyuria  Respiratory ROS: positive for - cough and shortness of breath  Cardiovascular ROS: positive for - chest pain. Gastrointestinal ROS: no abdominal pain, change in bowel habits, or black or bloody stools  Genito-Urinary ROS: no nocturia, dysuria, trouble voiding, frequency or hematuria  Musculoskeletal ROS: negative for- myalgias, arthralgias, or claudication  Neurological ROS: no TIA or stroke symptoms otherwise no significant change in symptoms or problems since yesterday as documented in previous progress notes.     SCHEDULED MEDICATIONS:   potassium phosphate IVPB  20 mmol Intravenous Once    lidocaine PF  5 mL Intradermal Once    sodium chloride flush  5-40 mL Intravenous 2 times per day    heparin flush  3 mL Intravenous 2 times per day    meropenem  1,000 mg Intravenous Q8H    tobramycin  300 mg Inhalation Q12H    ibuprofen  200 mg Oral TID     famotidine  20 mg Oral Daily    melatonin  9 mg Oral Nightly    zinc sulfate  50 mg Oral Daily    enoxaparin  40 mg Subcutaneous Daily    Arformoterol Tartrate  15 mcg Nebulization BID    budesonide  250 mcg Nebulization BID    ipratropium-albuterol  1 ampule Inhalation Q4H WA    rifAMPin  600 mg Oral Q48H    ethambutol  800 mg Oral Daily    ciprofloxacin  750 mg Oral 2 times per day       VITAL SIGNS:                                                                                                                          /73   Pulse 105   Temp 98.2 °F (36.8 °C) (Oral)   Resp 24   Ht 6' 1\" (1.854 m)   Wt 205 lb (93 kg)   SpO2 95%   BMI 27.05 kg/m²   Patient Vitals for the past 96 hrs (Last 3 readings):   Weight   04/10/21 0153 205 lb (93 kg)   04/07/21 1014 206 lb (93.4 kg)     OBJECTIVE:    HEENT: PERRL, EOM  Intact; sclera non-icteric, conjunctiva pink. Carotids are brisk in upstroke with normal contour. No carotid bruits. Normal jugular venous pulsation at 45°. No palpable cervical nor supraclavicular nodes. Thyroid not palpable. Trachea midline. Chest: Even excursion  Lungs: Few scattered expiratory rhonchi, no decreased tactile fremitus without inspiratory rales. Heart: Regular  rhythm; S1 > S2, no gallop or murmur. No clicks, rub, palpable thrills   or heaves. PMI nondisplaced, 5th intercostal space MCL. Abdomen: Soft, nontender, nondistended,  grossly protuberant, no masses or organomegaly. Bowel sounds active. Extremities: Without clubbing, cyanosis or edema. Pulses present 3+ upper extermities bilaterally; present 2+ DP and present 1+ PT bilaterally.      Data:   Scheduled Meds: Reviewed  Continuous Infusions:    sodium chloride      sodium chloride 33.3 mL/hr at 04/10/21 1230    sodium chloride 100 mL/hr at 04/10/21 1207       Intake/Output Summary (Last 24 hours) at 4/10/2021 1732  Last data filed at 4/10/2021 1550  Gross per 24 hour   Intake 490 ml   Output 1755 ml   Net -1265 ml     CBC:   Recent Labs     04/08/21  0230 04/09/21  0610 04/10/21  0249   WBC 7.8 11.5 10.2   HGB 13.6 15.9 15.4   HCT 40.9 45.9 45.9    138 147     BMP:  Recent Labs     04/08/21  0230 04/09/21  0610 04/10/21  0249   * 134 134   K 3.6 3.7 3.4*    97* 97*   CO2 22 27 25   BUN 8 8 8   CREATININE 0.6* 0.8 0.6*   LABGLOM >60 >60 >60     ABGs:   Lab Results   Component Value Date    PH 7.440 05/28/2019    PO2 59.1 05/28/2019    PCO2 32.1 05/28/2019     INR: No results for input(s): INR in the last 72 hours. PRO-BNP:   Lab Results   Component Value Date    PROBNP 1,132 (H) 04/08/2021    PROBNP 194 (H) 10/15/2020      TSH:   Lab Results   Component Value Date    TSH 1.840 04/08/2021      Cardiac Injury Profile:   Recent Labs     04/08/21  1250 04/08/21  1810   TROPONINI <0.01 <0.01      Lipid Profile:   Lab Results   Component Value Date    TRIG 49 04/08/2021    HDL 42 04/08/2021    LDLCALC 57 04/08/2021    CHOL 109 04/08/2021      Hemoglobin A1C: No components found for: HGBA1C     RAD:   Xr Chest (2 Vw)    Result Date: 4/8/2021  EXAMINATION: TWO XRAY VIEWS OF THE CHEST 4/8/2021 8:13 am COMPARISON: April 7, 2021 HISTORY: ORDERING SYSTEM PROVIDED HISTORY: pneumonia TECHNOLOGIST PROVIDED HISTORY: Reason for exam:->pneumonia FINDINGS: Worsening airspace disease at the left base likely representing pneumonia. Stable pneumonia or fibrosis at the right base. Pulmonary emphysema is also present. Worsening pneumonia on the left. Pneumonia and or fibrosis on the right. Emphysema.      Xr Chest 1 View    Result Date: 4/7/2021  EXAMINATION: ONE XRAY VIEW OF THE CHEST 4/7/2021 10:51 am COMPARISON: 02/02/2021 HISTORY: [address]esham Yip PROVIDED HISTORY: Cough SOB TECHNOLOGIST PROVIDED HISTORY: Latesha Lagos on 4/7/2021 10:52 AM 60 in AP erect portable Fatigue Shortness of Breath (thinks he has pneumonia again) Reason for exam:->Cough SOB FINDINGS: Large lung volumes may again reflect pulmonary emphysema. Normal cardiac silhouette size without definite vascular congestion. Chronic interstitial and alveolar density is again noted in the left lung base. New ground-glass opacity is noted in this region which may reflect acute on chronic process. Stable nonspecific elevation of right diaphragm may be associated with basilar scarring. No pneumothorax or definite pleural effusion. No acute displaced fracture. New ground-glass opacity in the left lung base, in the region of previous chronic interstitial and alveolar density, may reflect superimposed new atelectasis and/or pneumonia. 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 Monitor electrolytes and heart rhythm and intervene as needed.   Carefully monitor renal function and blood pressure while on nonsteroidal.    I have spent more than 25 minutes face to face with Tay Felton and reviewing notes and laboratory data, with greater than 50% of this time instructing and counseling the patient face to face regarding my findings and recommendations and I have answered all questions as posed to me by Mr. Mariely Bhakta. Shahrzad Nuñez, DO FACP,FACC,FSCAI      NOTE:  This report was transcribed using voice recognition software.   Every effort was made to ensure accuracy; however, inadvertent computerized transcription errors may be present

## 2021-04-10 NOTE — PROGRESS NOTES
PROGRESS  NOTE --                                                          INTERNAL  MEDICINE                                                                              I  PERSONALLY SAW , EXAMINED, AND CARED [address], 4/10/2021     LABS, XRAY ,CHART, AND MEDICATIONS  REVIEWED BY ME       Chief complaint: Cough, fever, shortness of breath      4/9/2021-SUBJECTIVE: Sanya Craft is alert awake and cooperative; oriented ×3. Denies any chest pain  nausea emesis. Tolerating diet. No abdominal pain. Dyspnea much improved chest discomfort nearly resolved. Wife is present through the exam. Highest temperature last 24 hours 100.4. Currently 98.9.  92% saturation 15 L high flow nasal cannula. Most recent blood pressure 97/54. Intake and output -1055 cc. Glucose 130. Troponins negative x3. CRP 18.5. Hemoglobin 15.9 WBC 11.5. Sed rate 21. Viral respiratory panel, SARS-CoV-2 all not detected. Legionella and strep antigens presumptive negative. Blood and urine cultures negative to date. Sputum Gram stain as follows--    Gram Stain Orderable 04/08/2021  5:30 PM  - [facility]   Group 5: >25 PMN's/LPF and <10 Epithelial cells/LPF   Moderate Polymorphonuclear leukocytes   Epithelial cells not seen   Few Gram positive cocci in pairs   Few Gram negative rods   Few Gram positive cocci in clusters      Chest x-ray from yesterday as follows--      FINDINGS:   Worsening airspace disease at the left base likely representing pneumonia. Stable pneumonia or fibrosis at the right base.  Pulmonary emphysema is also   present.       Impression:       Worsening pneumonia on the left.  Pneumonia and or fibrosis on the right. Emphysema       Social service note from yesterday appreciated. Patient wears 4 to 5 L while at home; no history of home health care and or skilled nursing facility.   Cardiology consult from °F (37.7 °C) (Oral)   Resp 22   Ht 6' 1\" (1.854 m)   Wt 205 lb (93 kg)   SpO2 94%   BMI 27.05 kg/m²     Labs:   CBC:   Lab Results   Component Value Date    WBC 10.2 04/10/2021    RBC 5.11 04/10/2021    HGB 15.4 04/10/2021    HCT 45.9 04/10/2021    MCV 89.8 04/10/2021    MCH 30.1 04/10/2021    MCHC 33.6 04/10/2021    RDW 14.2 04/10/2021     04/10/2021    MPV 11.1 04/10/2021     CBC with Differential:    Lab Results   Component Value Date    WBC 10.2 04/10/2021    RBC 5.11 04/10/2021    HGB 15.4 04/10/2021    HCT 45.9 04/10/2021     04/10/2021    MCV 89.8 04/10/2021    MCH 30.1 04/10/2021    MCHC 33.6 04/10/2021    RDW 14.2 04/10/2021    NRBC 0.0 04/07/2021    LYMPHOPCT 6.4 04/10/2021    MONOPCT 6.9 04/10/2021    MYELOPCT 0.9 04/07/2021    BASOPCT 0.2 04/10/2021    MONOSABS 0.70 04/10/2021    LYMPHSABS 0.65 04/10/2021    EOSABS 0.05 04/10/2021    BASOSABS 0.02 04/10/2021     Hemoglobin/Hematocrit:    Lab Results   Component Value Date    HGB 15.4 04/10/2021    HCT 45.9 04/10/2021     CMP:    Lab Results   Component Value Date     04/10/2021    K 3.4 04/10/2021    K 3.7 04/07/2021    CL 97 04/10/2021    CO2 25 04/10/2021    BUN 8 04/10/2021    CREATININE 0.6 04/10/2021    GFRAA >60 04/10/2021    LABGLOM >60 04/10/2021    GLUCOSE 113 04/10/2021    PROT 6.2 04/10/2021    LABALBU 3.1 04/10/2021    CALCIUM 9.0 04/10/2021    BILITOT 0.7 04/10/2021    ALKPHOS 72 04/10/2021    AST 18 04/10/2021    ALT 11 04/10/2021     BMP:    Lab Results   Component Value Date     04/10/2021    K 3.4 04/10/2021    K 3.7 04/07/2021    CL 97 04/10/2021    CO2 25 04/10/2021    BUN 8 04/10/2021    LABALBU 3.1 04/10/2021    CREATININE 0.6 04/10/2021    CALCIUM 9.0 04/10/2021    GFRAA >60 04/10/2021    LABGLOM >60 04/10/2021    GLUCOSE 113 04/10/2021     Hepatic Function Panel:    Lab Results   Component Value Date    ALKPHOS 72 04/10/2021    ALT 11 04/10/2021    AST 18 04/10/2021    PROT 6.2 04/10/2021    BILITOT 0.7 04/10/2021    BILIDIR 0.3 04/10/2021    IBILI 0.4 04/10/2021    LABALBU 3.1 04/10/2021     Ionized Calcium:  No results found for: IONCA  Magnesium:    Lab Results   Component Value Date    MG 1.7 04/10/2021     Phosphorus:    Lab Results   Component Value Date    PHOS 1.2 04/10/2021     LDH:    Lab Results   Component Value Date     01/02/2021     Uric Acid:    Lab Results   Component Value Date    LABURIC 3.4 04/08/2021     PT/INR:    Lab Results   Component Value Date    PROTIME 12.5 05/30/2019    INR 1.1 05/30/2019     Warfarin PT/INR:  No components found for: Maria Esther Brightly  PTT:    Lab Results   Component Value Date    APTT 31.4 05/30/2019   [APTT}  Troponin:    Lab Results   Component Value Date    TROPONINI <0.01 04/08/2021     Last 3 Troponin:    Lab Results   Component Value Date    TROPONINI <0.01 04/08/2021    TROPONINI <0.01 04/08/2021    TROPONINI <0.01 01/02/2021     U/A:    Lab Results   Component Value Date    COLORU Yellow 04/07/2021    PROTEINU TRACE 04/07/2021    PHUR 5.5 04/07/2021    LABCAST FEW 10/02/2019    WBCUA NONE 04/07/2021    RBCUA NONE 04/07/2021    MUCUS Present 04/07/2021    BACTERIA FEW 04/07/2021    CLARITYU Clear 04/07/2021    SPECGRAV >=1.030 04/07/2021    LEUKOCYTESUR Negative 04/07/2021    UROBILINOGEN 0.2 04/07/2021    BILIRUBINUR MODERATE 04/07/2021    BLOODU TRACE-LYSED 04/07/2021    GLUCOSEU Negative 04/07/2021     HgBA1c:    Lab Results   Component Value Date    LABA1C 5.1 04/08/2021     FLP:    Lab Results   Component Value Date    TRIG 49 04/08/2021    HDL 42 04/08/2021    LDLCALC 57 04/08/2021    LABVLDL 10 04/08/2021     TSH:    Lab Results   Component Value Date    TSH 1.840 04/08/2021     VITAMIN B12: No components found for: B12  FOLATE:    Lab Results   Component Value Date    FOLATE 6.8 04/08/2021        General appearance: Alert, Awake, Oriented times 3, no distress; high flow nasal cannula oxygen in place  Skin: Warm and dry ; no rashes  Head: phosphate 20 mmol IV today  Inhaled tobramycin 300 mg every 12 hours  Meropenem 1 g IV every 8 hours  Rifampin 600 mg every 48 hours  Ciprofloxacin 750 mg by mouth twice daily  Ethambutol 800 mg daily at this time  CT chest  Percocet 5 mg every 4 hours as needed for pain  May need bronchoscopy      Discussed with patient and spouse and nursing. Wayne Jaimes DO     1:25 PM     4/10/2021    TIME > 25 MINUTES    >  50 %  OF  TIME  DISCUSSION               ------------  INFORMATION  -----------      DIET:DIET GENERAL;      No Known Allergies      MEDICATION SIDE EFFECTS:none       SCHEDULED MEDS:                                 Scheduled Meds:   lidocaine PF  5 mL Intradermal Once    sodium chloride flush  5-40 mL Intravenous 2 times per day    heparin flush  3 mL Intravenous 2 times per day    meropenem  1,000 mg Intravenous Q8H    tobramycin  300 mg Inhalation Q12H    ibuprofen  200 mg Oral TID WC    famotidine  20 mg Oral Daily    melatonin  9 mg Oral Nightly    zinc sulfate  50 mg Oral Daily    enoxaparin  40 mg Subcutaneous Daily    Arformoterol Tartrate  15 mcg Nebulization BID    budesonide  250 mcg Nebulization BID    ipratropium-albuterol  1 ampule Inhalation Q4H WA    rifAMPin  600 mg Oral Q48H    ethambutol  800 mg Oral Daily    ciprofloxacin  750 mg Oral 2 times per day       Continuous Infusions:   sodium chloride      sodium chloride 33.3 mL/hr at 04/10/21 1230    sodium chloride 100 mL/hr at 04/10/21 1207         Data:       Intake/Output Summary (Last 24 hours) at 4/10/2021 1325  Last data filed at 4/10/2021 0936  Gross per 24 hour   Intake 490 ml   Output 1005 ml   Net -515 ml       Wt Readings from Last 3 Encounters:   04/10/21 205 lb (93 kg)   03/15/21 195 lb (88.5 kg)   01/26/21 195 lb (88.5 kg)       Labs: Additional    GLUCOSE:No results for input(s): POCGLU in the last 72 hours.     BNP:No results found for: BNP    CRP:   Recent Labs     04/08/21  0230 04/09/21  0610 04/10/21  0249   CRP 9.2* 18.5* 16.3*       ESR:  Recent Labs     04/08/21  0230 04/09/21  0610 04/10/21  0249   SEDRATE 10 21* 24*       RADIOLOGY: REVIEWED AVAILABLE REPORT  XR CHEST (2 VW)   Final Result   Worsening pneumonia on the left. Pneumonia and or fibrosis on the right. Emphysema. XR CHEST 1 VIEW   Final Result   New ground-glass opacity in the left lung base, in the region of previous   chronic interstitial and alveolar density, may reflect superimposed new   atelectasis and/or pneumonia.                    Mu Stone DO   1:25 PM     4/10/2021      Voice recognition software used for dictation

## 2021-04-10 NOTE — PROCEDURES
PICC  Catheter insertion date 4/10/2021     Product Number:  [id number]   Lot No: [id number]   Gauge: 4.5 fr   Lumen: single   R Basilic    Vein Diameter : 0.45 cm   Upper arm circumference (10CM ABOVE AC): 31 cm   Catheter Length : 49 cm(6 cm cut from a 55 cm line)   Internal Length: 48 cm   External Catheter Length: 1 cm   Ultrasound Used:yes  VPS [facility] [place] confirms PICC tip is placed in the lower 1/3 of the SVC or at the Cavoatrial junction. Floor nurse notified PICC is okay to use.    : Stefanie Paulson RN

## 2021-04-11 ENCOUNTER — APPOINTMENT (OUTPATIENT)
Dept: CT IMAGING | Age: 71
DRG: 193 | End: 2021-04-11
Payer: MEDICARE

## 2021-04-11 LAB
ALBUMIN SERPL-MCNC: 3 G/DL (ref 3.5–5.2)
ALP BLD-CCNC: 65 U/L (ref 40–129)
ALT SERPL-CCNC: 9 U/L (ref 0–40)
ANION GAP SERPL CALCULATED.3IONS-SCNC: 9 MMOL/L (ref 7–16)
AST SERPL-CCNC: 15 U/L (ref 0–39)
BASOPHILS ABSOLUTE: 0.02 E9/L (ref 0–0.2)
BASOPHILS RELATIVE PERCENT: 0.2 % (ref 0–2)
BILIRUB SERPL-MCNC: 0.4 MG/DL (ref 0–1.2)
BILIRUBIN DIRECT: <0.2 MG/DL (ref 0–0.3)
BILIRUBIN, INDIRECT: ABNORMAL MG/DL (ref 0–1)
BUN BLDV-MCNC: 4 MG/DL (ref 8–23)
C-REACTIVE PROTEIN: 17.2 MG/DL (ref 0–0.4)
CALCIUM SERPL-MCNC: 8.8 MG/DL (ref 8.6–10.2)
CHLORIDE BLD-SCNC: 98 MMOL/L (ref 98–107)
CO2: 27 MMOL/L (ref 22–29)
CREAT SERPL-MCNC: 0.4 MG/DL (ref 0.7–1.2)
EOSINOPHILS ABSOLUTE: 0.11 E9/L (ref 0.05–0.5)
EOSINOPHILS RELATIVE PERCENT: 1.3 % (ref 0–6)
GFR AFRICAN AMERICAN: >60
GFR NON-AFRICAN AMERICAN: >60 ML/MIN/1.73
GLUCOSE BLD-MCNC: 116 MG/DL (ref 74–99)
HCT VFR BLD CALC: 43 % (ref 37–54)
HEMOGLOBIN: 14.5 G/DL (ref 12.5–16.5)
IMMATURE GRANULOCYTES #: 0.03 E9/L
IMMATURE GRANULOCYTES %: 0.4 % (ref 0–5)
LYMPHOCYTES ABSOLUTE: 0.63 E9/L (ref 1.5–4)
LYMPHOCYTES RELATIVE PERCENT: 7.6 % (ref 20–42)
MAGNESIUM: 1.7 MG/DL (ref 1.6–2.6)
MCH RBC QN AUTO: 30 PG (ref 26–35)
MCHC RBC AUTO-ENTMCNC: 33.7 % (ref 32–34.5)
MCV RBC AUTO: 88.8 FL (ref 80–99.9)
MONOCYTES ABSOLUTE: 0.6 E9/L (ref 0.1–0.95)
MONOCYTES RELATIVE PERCENT: 7.3 % (ref 2–12)
NEUTROPHILS ABSOLUTE: 6.85 E9/L (ref 1.8–7.3)
NEUTROPHILS RELATIVE PERCENT: 83.2 % (ref 43–80)
PDW BLD-RTO: 14.1 FL (ref 11.5–15)
PHOSPHORUS: 1.7 MG/DL (ref 2.5–4.5)
PLATELET # BLD: 172 E9/L (ref 130–450)
PMV BLD AUTO: 10.6 FL (ref 7–12)
POTASSIUM SERPL-SCNC: 3.3 MMOL/L (ref 3.5–5)
RBC # BLD: 4.84 E12/L (ref 3.8–5.8)
SEDIMENTATION RATE, ERYTHROCYTE: 37 MM/HR (ref 0–15)
SODIUM BLD-SCNC: 134 MMOL/L (ref 132–146)
TOTAL PROTEIN: 5.9 G/DL (ref 6.4–8.3)
WBC # BLD: 8.2 E9/L (ref 4.5–11.5)

## 2021-04-11 PROCEDURE — 80076 HEPATIC FUNCTION PANEL: CPT

## 2021-04-11 PROCEDURE — 36415 COLL VENOUS BLD VENIPUNCTURE: CPT

## 2021-04-11 PROCEDURE — 71250 CT THORAX DX C-: CPT

## 2021-04-11 PROCEDURE — 6370000000 HC RX 637 (ALT 250 FOR IP): Performed by: SPECIALIST

## 2021-04-11 PROCEDURE — 85025 COMPLETE CBC W/AUTO DIFF WBC: CPT

## 2021-04-11 PROCEDURE — 6370000000 HC RX 637 (ALT 250 FOR IP): Performed by: INTERNAL MEDICINE

## 2021-04-11 PROCEDURE — 2500000003 HC RX 250 WO HCPCS: Performed by: INTERNAL MEDICINE

## 2021-04-11 PROCEDURE — 80048 BASIC METABOLIC PNL TOTAL CA: CPT

## 2021-04-11 PROCEDURE — 84100 ASSAY OF PHOSPHORUS: CPT

## 2021-04-11 PROCEDURE — 6360000002 HC RX W HCPCS: Performed by: SPECIALIST

## 2021-04-11 PROCEDURE — 6360000002 HC RX W HCPCS: Performed by: INTERNAL MEDICINE

## 2021-04-11 PROCEDURE — 83735 ASSAY OF MAGNESIUM: CPT

## 2021-04-11 PROCEDURE — 94640 AIRWAY INHALATION TREATMENT: CPT

## 2021-04-11 PROCEDURE — 2060000000 HC ICU INTERMEDIATE R&B

## 2021-04-11 PROCEDURE — 2580000003 HC RX 258: Performed by: SPECIALIST

## 2021-04-11 PROCEDURE — 94761 N-INVAS EAR/PLS OXIMETRY MLT: CPT

## 2021-04-11 PROCEDURE — 94660 CPAP INITIATION&MGMT: CPT

## 2021-04-11 PROCEDURE — 85651 RBC SED RATE NONAUTOMATED: CPT

## 2021-04-11 PROCEDURE — 86140 C-REACTIVE PROTEIN: CPT

## 2021-04-11 PROCEDURE — 2580000003 HC RX 258: Performed by: INTERNAL MEDICINE

## 2021-04-11 PROCEDURE — 2700000000 HC OXYGEN THERAPY PER DAY

## 2021-04-11 RX ADMIN — FAMOTIDINE 20 MG: 20 TABLET ORAL at 09:41

## 2021-04-11 RX ADMIN — IPRATROPIUM BROMIDE AND ALBUTEROL SULFATE 1 AMPULE: .5; 3 SOLUTION RESPIRATORY (INHALATION) at 16:10

## 2021-04-11 RX ADMIN — SODIUM CHLORIDE: 9 INJECTION, SOLUTION INTRAVENOUS at 04:37

## 2021-04-11 RX ADMIN — TOBRAMYCIN 300 MG: 40 INJECTION INTRAMUSCULAR; INTRAVENOUS at 19:35

## 2021-04-11 RX ADMIN — IBUPROFEN 200 MG: 200 TABLET, FILM COATED ORAL at 12:04

## 2021-04-11 RX ADMIN — CIPROFLOXACIN HYDROCHLORIDE 750 MG: 750 TABLET, FILM COATED ORAL at 12:04

## 2021-04-11 RX ADMIN — POTASSIUM CHLORIDE 40 MEQ: 1500 TABLET, EXTENDED RELEASE ORAL at 05:41

## 2021-04-11 RX ADMIN — RIFAMPIN 600 MG: 300 CAPSULE ORAL at 09:41

## 2021-04-11 RX ADMIN — OXYCODONE HYDROCHLORIDE 2.5 MG: 5 TABLET ORAL at 21:38

## 2021-04-11 RX ADMIN — Medication 9 MG: at 21:38

## 2021-04-11 RX ADMIN — ARFORMOTEROL TARTRATE 15 MCG: 15 SOLUTION RESPIRATORY (INHALATION) at 09:54

## 2021-04-11 RX ADMIN — TOBRAMYCIN 300 MG: 40 INJECTION INTRAMUSCULAR; INTRAVENOUS at 09:55

## 2021-04-11 RX ADMIN — IPRATROPIUM BROMIDE AND ALBUTEROL SULFATE 1 AMPULE: .5; 3 SOLUTION RESPIRATORY (INHALATION) at 12:33

## 2021-04-11 RX ADMIN — MEROPENEM 1000 MG: 1 INJECTION, POWDER, FOR SOLUTION INTRAVENOUS at 16:35

## 2021-04-11 RX ADMIN — MEROPENEM 1000 MG: 1 INJECTION, POWDER, FOR SOLUTION INTRAVENOUS at 09:47

## 2021-04-11 RX ADMIN — SODIUM CHLORIDE: 9 INJECTION, SOLUTION INTRAVENOUS at 20:05

## 2021-04-11 RX ADMIN — CIPROFLOXACIN HYDROCHLORIDE 750 MG: 750 TABLET, FILM COATED ORAL at 00:42

## 2021-04-11 RX ADMIN — BUDESONIDE 250 MCG: 0.25 SUSPENSION RESPIRATORY (INHALATION) at 19:32

## 2021-04-11 RX ADMIN — ZINC SULFATE 220 MG (50 MG) CAPSULE 50 MG: CAPSULE at 09:41

## 2021-04-11 RX ADMIN — MEROPENEM 1000 MG: 1 INJECTION, POWDER, FOR SOLUTION INTRAVENOUS at 00:42

## 2021-04-11 RX ADMIN — IPRATROPIUM BROMIDE AND ALBUTEROL SULFATE 1 AMPULE: .5; 3 SOLUTION RESPIRATORY (INHALATION) at 09:55

## 2021-04-11 RX ADMIN — ARFORMOTEROL TARTRATE 15 MCG: 15 SOLUTION RESPIRATORY (INHALATION) at 19:32

## 2021-04-11 RX ADMIN — ETHAMBUTOL HYDROCHLORIDE 800 MG: 400 TABLET ORAL at 09:41

## 2021-04-11 RX ADMIN — SODIUM CHLORIDE 25 ML: 9 INJECTION, SOLUTION INTRAVENOUS at 02:34

## 2021-04-11 RX ADMIN — OXYCODONE HYDROCHLORIDE AND ACETAMINOPHEN 1 TABLET: 5; 325 TABLET ORAL at 21:38

## 2021-04-11 RX ADMIN — IPRATROPIUM BROMIDE AND ALBUTEROL SULFATE 1 AMPULE: .5; 3 SOLUTION RESPIRATORY (INHALATION) at 19:32

## 2021-04-11 RX ADMIN — IBUPROFEN 200 MG: 200 TABLET, FILM COATED ORAL at 09:41

## 2021-04-11 RX ADMIN — IBUPROFEN 200 MG: 200 TABLET, FILM COATED ORAL at 16:35

## 2021-04-11 RX ADMIN — BUDESONIDE 250 MCG: 0.25 SUSPENSION RESPIRATORY (INHALATION) at 09:55

## 2021-04-11 ASSESSMENT — PAIN SCALES - GENERAL
PAINLEVEL_OUTOF10: 4
PAINLEVEL_OUTOF10: 2

## 2021-04-11 NOTE — PROGRESS NOTES
[address] [place] [address] [facility]  Progress Note    SUBJECTIVE:  Chief Complaint   Patient presents with    Fatigue    Shortness of Breath     thinks he has pneumonia again     The patient is still dyspneic. Still has a minimally productive cough. Tolerating antibiotics. He is still on high flow oxygen    Review of systems:  As stated above in the chief complaint, otherwise negative. Medications:  Scheduled Meds:   sodium chloride flush  5-40 mL Intravenous 2 times per day    heparin flush  3 mL Intravenous 2 times per day    meropenem  1,000 mg Intravenous Q8H    tobramycin  300 mg Inhalation Q12H    ibuprofen  200 mg Oral TID WC    famotidine  20 mg Oral Daily    melatonin  9 mg Oral Nightly    zinc sulfate  50 mg Oral Daily    enoxaparin  40 mg Subcutaneous Daily    Arformoterol Tartrate  15 mcg Nebulization BID    budesonide  250 mcg Nebulization BID    ipratropium-albuterol  1 ampule Inhalation Q4H [place]    rifAMPin  600 mg Oral Q48H    ethambutol  800 mg Oral Daily    ciprofloxacin  750 mg Oral 2 times per day     Continuous Infusions:   sodium chloride 25 mL (21 0234)    sodium chloride 33.3 mL/hr at 21 0437    sodium chloride 100 mL/hr at 04/10/21 1207     PRN Meds:sodium chloride flush, sodium chloride, heparin flush, trimethobenzamide, nitroGLYCERIN, morphine, potassium chloride **OR** potassium alternative oral replacement **OR** potassium chloride, acetaminophen, oxyCODONE-acetaminophen **AND** oxyCODONE    OBJECTIVE:  /84   Pulse 110   Temp 98 °F (36.7 °C) (Oral)   Resp 22   Ht 6' 1\" (1.854 m)   Wt 200 lb 12.8 oz (91.1 kg)   SpO2 90%   BMI 26.49 kg/m²   Temp  Av.4 °F (36.9 °C)  Min: 98 °F (36.7 °C)  Max: 98.7 °F (37.1 °C)  Constitutional: The patient is sitting up in bed. Awake and alert. He still has some respiratory distress. Skin: Warm and dry. No rashes were noted. HEENT: Round and reactive pupils. Moist mucous membranes. No ulcerations or thrush. Neck: Supple to movements. Chest: No use of accessory muscles to breathe. Symmetrical expansion. Crackles over the left base. 15LNC. Cardiovascular: Heart sounds rhythmic and regular. Abdomen: Positive bowel sounds to auscultation. Benign to palpation. Extremities: No edema. Lines: Right PICC 4/10/2021. Laboratory and Tests Review:  Lab Results   Component Value Date    WBC 8.2 04/11/2021    WBC 10.2 04/10/2021    WBC 11.5 04/09/2021    HGB 14.5 04/11/2021    HCT 43.0 04/11/2021    MCV 88.8 04/11/2021     04/11/2021     Lab Results   Component Value Date    NEUTROABS 6.85 04/11/2021    NEUTROABS 8.70 (H) 04/10/2021    NEUTROABS 9.45 (H) 04/09/2021     No results found for: [facility]  Lab Results   Component Value Date    ALT 9 04/11/2021    AST 15 04/11/2021    ALKPHOS 65 04/11/2021    BILITOT 0.4 04/11/2021     Lab Results   Component Value Date     04/11/2021    K 3.3 04/11/2021    K 3.7 04/07/2021    CL 98 04/11/2021    CO2 27 04/11/2021    BUN 4 04/11/2021    CREATININE 0.4 04/11/2021    CREATININE 0.6 04/10/2021    CREATININE 0.8 04/09/2021    GFRAA >60 04/11/2021    LABGLOM >60 04/11/2021    GLUCOSE 116 04/11/2021    PROT 5.9 04/11/2021    LABALBU 3.0 04/11/2021    CALCIUM 8.8 04/11/2021    BILITOT 0.4 04/11/2021    ALKPHOS 65 04/11/2021    AST 15 04/11/2021    ALT 9 04/11/2021     Lab Results   Component Value Date    CRP 17.2 (H) 04/11/2021    CRP 16.3 (H) 04/10/2021    CRP 18.5 (H) 04/09/2021     Lab Results   Component Value Date    SEDRATE 37 (H) 04/11/2021    SEDRATE 24 (H) 04/10/2021    SEDRATE 21 (H) 04/09/2021     Radiology:  Chest x-ray- worsening pneumonia on the left     Microbiology:   Respiratory culture 4/8/2021: OP tonja present  Blood cultures 4/7/2021: negative so far  Strep/Legionella urine antigens 4/7/2021: negative  Respiratory Panel with SARS-CoV-2: not detected     No results for input(s): PROCAL in the last 72 hours.     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   I think the patient may need a bronchoscopy    Lisa Malik  10:38 AM  4/11/2021

## 2021-04-11 NOTE — PROGRESS NOTES
PROGRESS  NOTE --                                                          INTERNAL  MEDICINE                                                                              I  PERSONALLY SAW , EXAMINED, AND CARED [address], 4/11/2021     LABS, XRAY ,CHART, AND MEDICATIONS  REVIEWED BY ME       Chief complaint: Cough, fever, shortness of breath      4/9/2021-SUBJECTIVE: Tay Felton is alert awake and cooperative; oriented ×3. Denies any chest pain  nausea emesis. Tolerating diet. No abdominal pain. Dyspnea much improved chest discomfort nearly resolved. Wife is present through the exam. Highest temperature last 24 hours 100.4. Currently 98.9.  92% saturation 15 L high flow nasal cannula. Most recent blood pressure 97/54. Intake and output -1055 cc. Glucose 130. Troponins negative x3. CRP 18.5. Hemoglobin 15.9 WBC 11.5. Sed rate 21. Viral respiratory panel, SARS-CoV-2 all not detected. Legionella and strep antigens presumptive negative. Blood and urine cultures negative to date. Sputum Gram stain as follows--    Gram Stain Orderable 04/08/2021  5:30 PM  - [facility]   Group 5: >25 PMN's/LPF and <10 Epithelial cells/LPF   Moderate Polymorphonuclear leukocytes   Epithelial cells not seen   Few Gram positive cocci in pairs   Few Gram negative rods   Few Gram positive cocci in clusters      Chest x-ray from yesterday as follows--      FINDINGS:   Worsening airspace disease at the left base likely representing pneumonia. Stable pneumonia or fibrosis at the right base.  Pulmonary emphysema is also   present.       Impression:       Worsening pneumonia on the left.  Pneumonia and or fibrosis on the right. Emphysema       Social service note from yesterday appreciated. Patient wears 4 to 5 L while at home; no history of home health care and or skilled nursing facility.   Cardiology consult from yesterday appreciated. Cardiology believes chest discomfort not angina rather due to coughing and soreness of the chest.  Pulmonary note from yesterday, end-stage COPD; right-sided pleural scarring chronic; new bilateral infiltrates likely representing pneumonia, appear worse with hydration. History of Mycobacterium avium complex. ID note from yesterday, patient thinks he is having pleuritic left chest pain. Cefepime changed to meropenem; continue IV ciprofloxacin; start inhaled tobramycin, continue with ethambutol and rifampin, monitor labs. Nursing note from earlier this morning patient using bedside urinal with oxygen off, desaturated to the 70s. Nonrebreather applied since resumption of O2 only resulted in SPO2 of 80%. Patient very anxious because of dyspnea. Pulmonary was called; BiPAP placed, SPO2 95%. Patient early this a.m. requested off BiPAP; placed on 15 L high flow with saturation of 97%. 4/10/2021-patient laying quietly in bed, not having a good day. Still having shortness of breath episode; just had aerosol treatment. Great difficulties overnight with BiPAP. Has little to no appetite. He is having more chest discomfort mostly left-sided. Temperature still elevated 99.9.  94% saturation 15 L high flow nasal cannula. Intake and output -1210 cc. Potassium 3.4 glucose 113 phosphorus 1.2 protein 6.2 albumin 3.1 CRP still elevated but improving 16.3. Hemoglobin 15.4 WBC 10.2. Sed rate 24. PICC line permit has been entered. Pulmonary note from today, minimal reddish sputum. BiPAP only used a few hours last evening felt too strong. Tires quickly. Continue nebulizers wean oxygen as tolerated. ID note from today minimal productive cough still complaining of dyspnea. Continue meropenem and inhaled tobramycin. Continue oral ciprofloxacin and rifampin. Continue aerosol treatments. Patient may need bronchoscopy. 4/11/2021-patient sitting up in bed; still does not feel well.   Continues on 15 L nasal cannula high flow; poor sleep. Appetite has diminished. Still has left-sided chest discomfort, mostly pleuritic and/or ribs from coughing. Afebrile last 24 hours. Pulse 110.  90% saturation 15 L high flow nasal cannula. Intake and output -1525 cc. Potassium 3.3 BUN 4 magnesium 1.7 phosphorus 1.7 CRP 17.2, higher. Albumin 3.0 WBC 8.2 platelets 17.4. Glucose 116. CT scan of chest done this morning with following results--      FINDINGS:   There are advanced emphysematous changes in the lung parenchyma with multiple   bullous formation, some of fair size, with more spread areas of pan-lobular   emphysema pattern. Additional areas of pulmonary fibrosis and retraction with reticulation of   the inter lobular septi are seen throughout both lungs.  Several areas of   peripheral and central scarring are seen with spiculated appearance.  But   this appears to be stable over time, as also observed in the right lower lung   base, where underline pleural reaction is also present on chronic basis. As new finding since the study of October 15, 2020 is the development of a   area of confluent consolidation with somewhat triangular shape configuration,   measuring 6 x 3.6 x 4.4 cm, with the base against the pleural surface as seen   on axial images, located the in the anterior segment of the left upper lobe,   posterolateral to a previous area of a pulmonary parenchyma/pleural scar with   the areas of fibrosis and retraction that is a chronic finding in the left   upper lobe.      This is new finding could be representation of acute the pneumonic process   but a mass lesion will be part of the differential diagnosis, this is new   finding since the study of October 2020.  Short-term interval follow a   clinical treatment trial for pneumonia could be a consideration prior   interventional procedures final interpretation could require correlation with   the histopathology.  Alternative will be evaluation 04/11/2021    HCT 43.0 04/11/2021    MCV 88.8 04/11/2021    MCH 30.0 04/11/2021    MCHC 33.7 04/11/2021    RDW 14.1 04/11/2021     04/11/2021    MPV 10.6 04/11/2021     CBC with Differential:    Lab Results   Component Value Date    WBC 8.2 04/11/2021    RBC 4.84 04/11/2021    HGB 14.5 04/11/2021    HCT 43.0 04/11/2021     04/11/2021    MCV 88.8 04/11/2021    MCH 30.0 04/11/2021    MCHC 33.7 04/11/2021    RDW 14.1 04/11/2021    NRBC 0.0 04/07/2021    LYMPHOPCT 7.6 04/11/2021    MONOPCT 7.3 04/11/2021    MYELOPCT 0.9 04/07/2021    BASOPCT 0.2 04/11/2021    MONOSABS 0.60 04/11/2021    LYMPHSABS 0.63 04/11/2021    EOSABS 0.11 04/11/2021    BASOSABS 0.02 04/11/2021     Hemoglobin/Hematocrit:    Lab Results   Component Value Date    HGB 14.5 04/11/2021    HCT 43.0 04/11/2021     CMP:    Lab Results   Component Value Date     04/11/2021    K 3.3 04/11/2021    K 3.7 04/07/2021    CL 98 04/11/2021    CO2 27 04/11/2021    BUN 4 04/11/2021    CREATININE 0.4 04/11/2021    GFRAA >60 04/11/2021    LABGLOM >60 04/11/2021    GLUCOSE 116 04/11/2021    PROT 5.9 04/11/2021    LABALBU 3.0 04/11/2021    CALCIUM 8.8 04/11/2021    BILITOT 0.4 04/11/2021    ALKPHOS 65 04/11/2021    AST 15 04/11/2021    ALT 9 04/11/2021     BMP:    Lab Results   Component Value Date     04/11/2021    K 3.3 04/11/2021    K 3.7 04/07/2021    CL 98 04/11/2021    CO2 27 04/11/2021    BUN 4 04/11/2021    LABALBU 3.0 04/11/2021    CREATININE 0.4 04/11/2021    CALCIUM 8.8 04/11/2021    GFRAA >60 04/11/2021    LABGLOM >60 04/11/2021    GLUCOSE 116 04/11/2021     Hepatic Function Panel:    Lab Results   Component Value Date    ALKPHOS 65 04/11/2021    ALT 9 04/11/2021    AST 15 04/11/2021    PROT 5.9 04/11/2021    BILITOT 0.4 04/11/2021    BILIDIR <0.2 04/11/2021    IBILI see below 04/11/2021    LABALBU 3.0 04/11/2021     Ionized Calcium:  No results found for: IONCA  Magnesium:    Lab Results   Component Value Date    MG 1.7 04/11/2021 Phosphorus:    Lab Results   Component Value Date    PHOS 1.7 04/11/2021     LDH:    Lab Results   Component Value Date     01/02/2021     Uric Acid:    Lab Results   Component Value Date    LABURIC 3.4 04/08/2021     PT/INR:    Lab Results   Component Value Date    PROTIME 12.5 05/30/2019    INR 1.1 05/30/2019     Warfarin PT/INR:  No components found for: Corinne Manas  PTT:    Lab Results   Component Value Date    APTT 31.4 05/30/2019   [APTT}  Troponin:    Lab Results   Component Value Date    TROPONINI <0.01 04/08/2021     Last 3 Troponin:    Lab Results   Component Value Date    TROPONINI <0.01 04/08/2021    TROPONINI <0.01 04/08/2021    TROPONINI <0.01 01/02/2021     U/A:    Lab Results   Component Value Date    COLORU Yellow 04/07/2021    PROTEINU TRACE 04/07/2021    PHUR 5.5 04/07/2021    LABCAST FEW 10/02/2019    WBCUA NONE 04/07/2021    RBCUA NONE 04/07/2021    MUCUS Present 04/07/2021    BACTERIA FEW 04/07/2021    CLARITYU Clear 04/07/2021    SPECGRAV >=1.030 04/07/2021    LEUKOCYTESUR Negative 04/07/2021    UROBILINOGEN 0.2 04/07/2021    BILIRUBINUR MODERATE 04/07/2021    BLOODU TRACE-LYSED 04/07/2021    GLUCOSEU Negative 04/07/2021     HgBA1c:    Lab Results   Component Value Date    LABA1C 5.1 04/08/2021     FLP:    Lab Results   Component Value Date    TRIG 49 04/08/2021    HDL 42 04/08/2021    LDLCALC 57 04/08/2021    LABVLDL 10 04/08/2021     TSH:    Lab Results   Component Value Date    TSH 1.840 04/08/2021     VITAMIN B12: No components found for: B12  FOLATE:    Lab Results   Component Value Date    FOLATE 6.8 04/08/2021        General appearance: Alert, Awake, Oriented times 3, no distress; high flow nasal cannula oxygen in place  Skin: Warm and dry ; no rashes  Head: Normocephalic. No masses, lesions or tenderness noted  Eyes: Conjunctivae pink, sclera white. PERRL,EOM-I  Ears: External ears normal  Nose/Sinuses: Nares normal. Septum midline.  Mucosa normal. No drainage  Oropharynx: Oropharynx clear with no exudate seen  Neck: Supple. No jugular venous distension, lymphadenopathy or thyromegaly Trachea midline  Lungs: Wheezes and rhonchi bilaterally  Heart: S1 S2  Regular rate and rhythm. No rub, murmur or gallop  Abdomen: Soft, non-tender. BS normal. No masses, organomegaly; no rebound or guarding  Extremities: No edema, Peripheral pulses palpable  Musculoskeletal: Muscular strength appears intact. Neuro:  No focal motor defects ; II-XII grossly intact .  GLASER equally    TELEMETRY: REVIEWED--Telemetry: Sinus, occasional PVCs and sinus tachycardia    ASSESSMENT:   Principal Problem:    Pneumonia  Active Problems:    COPD (chronic obstructive pulmonary disease) ([facility])    Bullous emphysema ([facility])    Pulmonary emphysema with fibrosis of lung ([facility])    Thoracic ascending aortic aneurysm ([facility])    Glucose intolerance    SADE (mycobacterium avium-intracellulare) ([facility])    Acute on chronic respiratory failure with hypoxia ([facility])    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 ([facility] [address]    Acute heart failure with preserved ejection fraction ([facility])  Resolved Problems:    Acute respiratory disease due to severe acute respiratory syndrome coronavirus 2 (SARS-CoV-2)      PLAN:  SEE ORDERS      RE  CHANGES AND FINDINGS   Medications reviewed with patient  GI prophylaxis  DVT prophylaxis  Consultants notes reviewed  Aerosol treatments  Ciprofloxacin 750 mg twice daily by mouth  Famotidine 20 mg daily  Ethambutol 800 mg Monday Wednesday Friday  Rifampin 600 mg every 48 hours  Meropenem 1 g IV every 8 hours  Tobramycin 300 mg inhaled every 12 hours  Morphine 2 mg IV every 4 hours as needed for severe pain  Percocet 5 mg every 4 hours as needed for pain  Potassium phosphate 20 mmol IV today  Inhaled tobramycin 300 mg every 12 hours  Meropenem 1 g IV every 8 hours  Rifampin 600 mg every 48 hours  Ciprofloxacin 750 mg by mouth twice daily  Ethambutol 800 mg daily at this time  CT chest  Percocet 5 mg every 4 hours as needed for pain  May need bronchoscopy  Continue aerosol treatments  Cipro 750 mg twice daily by mouth  Ethambutol 800 mg daily  Meropenem 1 g IV every 8 hours  Inhaled tobramycin 300 mg every 12 hours  Replace potassium phosphate 30 mmol IV today  Rifampin 600 mg every 48 hours  Monitor labs  Percocet 5 every 4 hours as needed for pain  Likely bronchoscopy      Discussed with patient and spouse and nursing. Mihir Jaimes DO     11:53 AM     4/11/2021    TIME > 25 MINUTES    >  50 %  OF  TIME  DISCUSSION               ------------  INFORMATION  -----------      DIET:DIET GENERAL;      No Known Allergies      MEDICATION SIDE EFFECTS:none       SCHEDULED MEDS:                                 Scheduled Meds:   sodium chloride flush  5-40 mL Intravenous 2 times per day    heparin flush  3 mL Intravenous 2 times per day    meropenem  1,000 mg Intravenous Q8H    tobramycin  300 mg Inhalation Q12H    ibuprofen  200 mg Oral TID WC    famotidine  20 mg Oral Daily    melatonin  9 mg Oral Nightly    zinc sulfate  50 mg Oral Daily    enoxaparin  40 mg Subcutaneous Daily    Arformoterol Tartrate  15 mcg Nebulization BID    budesonide  250 mcg Nebulization BID    ipratropium-albuterol  1 ampule Inhalation Q4H WA    rifAMPin  600 mg Oral Q48H    ethambutol  800 mg Oral Daily    ciprofloxacin  750 mg Oral 2 times per day       Continuous Infusions:   sodium chloride 25 mL (04/11/21 0234)    sodium chloride 33.3 mL/hr at 04/11/21 0437    sodium chloride 100 mL/hr at 04/10/21 1207         Data:       Intake/Output Summary (Last 24 hours) at 4/11/2021 1153  Last data filed at 4/11/2021 0954  Gross per 24 hour   Intake 2765 ml   Output 3080 ml   Net -315 ml       Wt Readings from Last 3 Encounters:   04/11/21 200 lb 12.8 oz (91.1 kg)   03/15/21 195 lb (88.5 kg)   01/26/21 195 lb (88.5 kg)       Labs:  Additional    GLUCOSE:No results for input(s): POCGLU in the

## 2021-04-12 ENCOUNTER — ANESTHESIA (OUTPATIENT)
Dept: OPERATING ROOM | Age: 71
DRG: 193 | End: 2021-04-12
Payer: MEDICARE

## 2021-04-12 ENCOUNTER — ANESTHESIA EVENT (OUTPATIENT)
Dept: OPERATING ROOM | Age: 71
DRG: 193 | End: 2021-04-12
Payer: MEDICARE

## 2021-04-12 VITALS — OXYGEN SATURATION: 90 % | DIASTOLIC BLOOD PRESSURE: 91 MMHG | SYSTOLIC BLOOD PRESSURE: 143 MMHG

## 2021-04-12 LAB
ALBUMIN SERPL-MCNC: 3.2 G/DL (ref 3.5–5.2)
ALP BLD-CCNC: 63 U/L (ref 40–129)
ALT SERPL-CCNC: 12 U/L (ref 0–40)
ANION GAP SERPL CALCULATED.3IONS-SCNC: 6 MMOL/L (ref 7–16)
AST SERPL-CCNC: 17 U/L (ref 0–39)
BASOPHILS ABSOLUTE: 0.03 E9/L (ref 0–0.2)
BASOPHILS RELATIVE PERCENT: 0.3 % (ref 0–2)
BILIRUB SERPL-MCNC: 0.5 MG/DL (ref 0–1.2)
BILIRUBIN DIRECT: <0.2 MG/DL (ref 0–0.3)
BILIRUBIN, INDIRECT: ABNORMAL MG/DL (ref 0–1)
BLOOD CULTURE, ROUTINE: NORMAL
BUN BLDV-MCNC: 4 MG/DL (ref 8–23)
C-REACTIVE PROTEIN: 11.6 MG/DL (ref 0–0.4)
CALCIUM SERPL-MCNC: 9.5 MG/DL (ref 8.6–10.2)
CHLORIDE BLD-SCNC: 99 MMOL/L (ref 98–107)
CO2: 29 MMOL/L (ref 22–29)
CREAT SERPL-MCNC: 0.5 MG/DL (ref 0.7–1.2)
CULTURE, BLOOD 2: NORMAL
EOSINOPHILS ABSOLUTE: 0.35 E9/L (ref 0.05–0.5)
EOSINOPHILS RELATIVE PERCENT: 4.1 % (ref 0–6)
GFR AFRICAN AMERICAN: >60
GFR NON-AFRICAN AMERICAN: >60 ML/MIN/1.73
GLUCOSE BLD-MCNC: 107 MG/DL (ref 74–99)
HCT VFR BLD CALC: 45 % (ref 37–54)
HEMOGLOBIN: 15.3 G/DL (ref 12.5–16.5)
IMMATURE GRANULOCYTES #: 0.04 E9/L
IMMATURE GRANULOCYTES %: 0.5 % (ref 0–5)
LYMPHOCYTES ABSOLUTE: 0.76 E9/L (ref 1.5–4)
LYMPHOCYTES RELATIVE PERCENT: 8.8 % (ref 20–42)
MAGNESIUM: 1.7 MG/DL (ref 1.6–2.6)
MCH RBC QN AUTO: 30.3 PG (ref 26–35)
MCHC RBC AUTO-ENTMCNC: 34 % (ref 32–34.5)
MCV RBC AUTO: 89.1 FL (ref 80–99.9)
MONOCYTES ABSOLUTE: 0.75 E9/L (ref 0.1–0.95)
MONOCYTES RELATIVE PERCENT: 8.7 % (ref 2–12)
NEUTROPHILS ABSOLUTE: 6.71 E9/L (ref 1.8–7.3)
NEUTROPHILS RELATIVE PERCENT: 77.6 % (ref 43–80)
PDW BLD-RTO: 14.2 FL (ref 11.5–15)
PHOSPHORUS: 2.4 MG/DL (ref 2.5–4.5)
PLATELET # BLD: 235 E9/L (ref 130–450)
PMV BLD AUTO: 10.6 FL (ref 7–12)
POTASSIUM SERPL-SCNC: 3.7 MMOL/L (ref 3.5–5)
RBC # BLD: 5.05 E12/L (ref 3.8–5.8)
SEDIMENTATION RATE, ERYTHROCYTE: 28 MM/HR (ref 0–15)
SODIUM BLD-SCNC: 134 MMOL/L (ref 132–146)
TOTAL PROTEIN: 6.2 G/DL (ref 6.4–8.3)
WBC # BLD: 8.6 E9/L (ref 4.5–11.5)

## 2021-04-12 PROCEDURE — 2580000003 HC RX 258: Performed by: SPECIALIST

## 2021-04-12 PROCEDURE — 6370000000 HC RX 637 (ALT 250 FOR IP): Performed by: INTERNAL MEDICINE

## 2021-04-12 PROCEDURE — 84100 ASSAY OF PHOSPHORUS: CPT

## 2021-04-12 PROCEDURE — 3600007511: Performed by: INTERNAL MEDICINE

## 2021-04-12 PROCEDURE — 3700000000 HC ANESTHESIA ATTENDED CARE: Performed by: INTERNAL MEDICINE

## 2021-04-12 PROCEDURE — 7100000011 HC PHASE II RECOVERY - ADDTL 15 MIN: Performed by: INTERNAL MEDICINE

## 2021-04-12 PROCEDURE — 94660 CPAP INITIATION&MGMT: CPT

## 2021-04-12 PROCEDURE — 94640 AIRWAY INHALATION TREATMENT: CPT

## 2021-04-12 PROCEDURE — 85025 COMPLETE CBC W/AUTO DIFF WBC: CPT

## 2021-04-12 PROCEDURE — 2060000000 HC ICU INTERMEDIATE R&B

## 2021-04-12 PROCEDURE — 2500000003 HC RX 250 WO HCPCS: Performed by: INTERNAL MEDICINE

## 2021-04-12 PROCEDURE — 0B978ZX DRAINAGE OF LEFT MAIN BRONCHUS, VIA NATURAL OR ARTIFICIAL OPENING ENDOSCOPIC, DIAGNOSTIC: ICD-10-PCS | Performed by: INTERNAL MEDICINE

## 2021-04-12 PROCEDURE — 36415 COLL VENOUS BLD VENIPUNCTURE: CPT

## 2021-04-12 PROCEDURE — 87116 MYCOBACTERIA CULTURE: CPT

## 2021-04-12 PROCEDURE — 85651 RBC SED RATE NONAUTOMATED: CPT

## 2021-04-12 PROCEDURE — 6360000002 HC RX W HCPCS: Performed by: INTERNAL MEDICINE

## 2021-04-12 PROCEDURE — 6360000002 HC RX W HCPCS: Performed by: NURSE ANESTHETIST, CERTIFIED REGISTERED

## 2021-04-12 PROCEDURE — 87070 CULTURE OTHR SPECIMN AEROBIC: CPT

## 2021-04-12 PROCEDURE — 2700000000 HC OXYGEN THERAPY PER DAY

## 2021-04-12 PROCEDURE — 87206 SMEAR FLUORESCENT/ACID STAI: CPT

## 2021-04-12 PROCEDURE — 94761 N-INVAS EAR/PLS OXIMETRY MLT: CPT

## 2021-04-12 PROCEDURE — 3700000001 HC ADD 15 MINUTES (ANESTHESIA): Performed by: INTERNAL MEDICINE

## 2021-04-12 PROCEDURE — 7100000010 HC PHASE II RECOVERY - FIRST 15 MIN: Performed by: INTERNAL MEDICINE

## 2021-04-12 PROCEDURE — 86140 C-REACTIVE PROTEIN: CPT

## 2021-04-12 PROCEDURE — 6360000002 HC RX W HCPCS: Performed by: SPECIALIST

## 2021-04-12 PROCEDURE — 87102 FUNGUS ISOLATION CULTURE: CPT

## 2021-04-12 PROCEDURE — 87015 SPECIMEN INFECT AGNT CONCNTJ: CPT

## 2021-04-12 PROCEDURE — 83735 ASSAY OF MAGNESIUM: CPT

## 2021-04-12 PROCEDURE — 80076 HEPATIC FUNCTION PANEL: CPT

## 2021-04-12 PROCEDURE — 2709999900 HC NON-CHARGEABLE SUPPLY: Performed by: INTERNAL MEDICINE

## 2021-04-12 PROCEDURE — 80048 BASIC METABOLIC PNL TOTAL CA: CPT

## 2021-04-12 PROCEDURE — 2580000003 HC RX 258: Performed by: INTERNAL MEDICINE

## 2021-04-12 PROCEDURE — 87205 SMEAR GRAM STAIN: CPT

## 2021-04-12 PROCEDURE — 3600007501: Performed by: INTERNAL MEDICINE

## 2021-04-12 PROCEDURE — 2580000003 HC RX 258: Performed by: NURSE ANESTHETIST, CERTIFIED REGISTERED

## 2021-04-12 PROCEDURE — 6370000000 HC RX 637 (ALT 250 FOR IP): Performed by: SPECIALIST

## 2021-04-12 RX ORDER — MIDAZOLAM HYDROCHLORIDE 1 MG/ML
INJECTION INTRAMUSCULAR; INTRAVENOUS PRN
Status: DISCONTINUED | OUTPATIENT
Start: 2021-04-12 | End: 2021-04-12 | Stop reason: SDUPTHER

## 2021-04-12 RX ORDER — PROPOFOL 10 MG/ML
INJECTION, EMULSION INTRAVENOUS PRN
Status: DISCONTINUED | OUTPATIENT
Start: 2021-04-12 | End: 2021-04-12 | Stop reason: SDUPTHER

## 2021-04-12 RX ORDER — SODIUM CHLORIDE 9 MG/ML
INJECTION, SOLUTION INTRAVENOUS CONTINUOUS PRN
Status: DISCONTINUED | OUTPATIENT
Start: 2021-04-12 | End: 2021-04-12 | Stop reason: SDUPTHER

## 2021-04-12 RX ADMIN — IPRATROPIUM BROMIDE AND ALBUTEROL SULFATE 1 AMPULE: .5; 3 SOLUTION RESPIRATORY (INHALATION) at 21:05

## 2021-04-12 RX ADMIN — CIPROFLOXACIN HYDROCHLORIDE 750 MG: 750 TABLET, FILM COATED ORAL at 15:50

## 2021-04-12 RX ADMIN — FAMOTIDINE 20 MG: 20 TABLET ORAL at 09:00

## 2021-04-12 RX ADMIN — ZINC SULFATE 220 MG (50 MG) CAPSULE 50 MG: CAPSULE at 09:01

## 2021-04-12 RX ADMIN — SODIUM CHLORIDE: 9 INJECTION, SOLUTION INTRAVENOUS at 09:02

## 2021-04-12 RX ADMIN — CIPROFLOXACIN HYDROCHLORIDE 750 MG: 750 TABLET, FILM COATED ORAL at 00:30

## 2021-04-12 RX ADMIN — SODIUM CHLORIDE: 9 INJECTION, SOLUTION INTRAVENOUS at 04:27

## 2021-04-12 RX ADMIN — IPRATROPIUM BROMIDE AND ALBUTEROL SULFATE 1 AMPULE: .5; 3 SOLUTION RESPIRATORY (INHALATION) at 16:50

## 2021-04-12 RX ADMIN — MEROPENEM 1000 MG: 1 INJECTION, POWDER, FOR SOLUTION INTRAVENOUS at 15:50

## 2021-04-12 RX ADMIN — POTASSIUM PHOSPHATE, MONOBASIC AND POTASSIUM PHOSPHATE, DIBASIC 20 MMOL: 224; 236 INJECTION, SOLUTION, CONCENTRATE INTRAVENOUS at 16:05

## 2021-04-12 RX ADMIN — SODIUM CHLORIDE: 9 INJECTION, SOLUTION INTRAVENOUS at 13:39

## 2021-04-12 RX ADMIN — IBUPROFEN 200 MG: 200 TABLET, FILM COATED ORAL at 09:00

## 2021-04-12 RX ADMIN — SODIUM CHLORIDE: 9 INJECTION, SOLUTION INTRAVENOUS at 11:50

## 2021-04-12 RX ADMIN — Medication 9 MG: at 20:40

## 2021-04-12 RX ADMIN — SODIUM CHLORIDE: 9 INJECTION, SOLUTION INTRAVENOUS at 00:22

## 2021-04-12 RX ADMIN — ETHAMBUTOL HYDROCHLORIDE 800 MG: 400 TABLET ORAL at 09:00

## 2021-04-12 RX ADMIN — SODIUM CHLORIDE: 9 INJECTION, SOLUTION INTRAVENOUS at 19:31

## 2021-04-12 RX ADMIN — MEROPENEM 1000 MG: 1 INJECTION, POWDER, FOR SOLUTION INTRAVENOUS at 08:58

## 2021-04-12 RX ADMIN — IPRATROPIUM BROMIDE AND ALBUTEROL SULFATE 1 AMPULE: .5; 3 SOLUTION RESPIRATORY (INHALATION) at 07:49

## 2021-04-12 RX ADMIN — ARFORMOTEROL TARTRATE 15 MCG: 15 SOLUTION RESPIRATORY (INHALATION) at 21:05

## 2021-04-12 RX ADMIN — MIDAZOLAM 1 MG: 1 INJECTION INTRAMUSCULAR; INTRAVENOUS at 13:47

## 2021-04-12 RX ADMIN — PROPOFOL 20 MG: 10 INJECTION, EMULSION INTRAVENOUS at 13:48

## 2021-04-12 RX ADMIN — BUDESONIDE 250 MCG: 0.25 SUSPENSION RESPIRATORY (INHALATION) at 21:05

## 2021-04-12 RX ADMIN — TOBRAMYCIN 300 MG: 40 INJECTION INTRAMUSCULAR; INTRAVENOUS at 21:17

## 2021-04-12 RX ADMIN — ARFORMOTEROL TARTRATE 15 MCG: 15 SOLUTION RESPIRATORY (INHALATION) at 07:49

## 2021-04-12 RX ADMIN — BUDESONIDE 250 MCG: 0.25 SUSPENSION RESPIRATORY (INHALATION) at 07:49

## 2021-04-12 RX ADMIN — TOBRAMYCIN 300 MG: 40 INJECTION INTRAMUSCULAR; INTRAVENOUS at 07:51

## 2021-04-12 RX ADMIN — SODIUM CHLORIDE, PRESERVATIVE FREE 10 ML: 5 INJECTION INTRAVENOUS at 09:01

## 2021-04-12 RX ADMIN — MEROPENEM 1000 MG: 1 INJECTION, POWDER, FOR SOLUTION INTRAVENOUS at 01:22

## 2021-04-12 ASSESSMENT — ENCOUNTER SYMPTOMS: SHORTNESS OF BREATH: 1

## 2021-04-12 ASSESSMENT — PULMONARY FUNCTION TESTS
PIF_VALUE: 1

## 2021-04-12 ASSESSMENT — COPD QUESTIONNAIRES: CAT_SEVERITY: SEVERE

## 2021-04-12 ASSESSMENT — PAIN DESCRIPTION - LOCATION: LOCATION: CHEST

## 2021-04-12 ASSESSMENT — PAIN DESCRIPTION - PROGRESSION: CLINICAL_PROGRESSION: NOT CHANGED

## 2021-04-12 ASSESSMENT — PAIN DESCRIPTION - PAIN TYPE: TYPE: ACUTE PAIN

## 2021-04-12 ASSESSMENT — PAIN DESCRIPTION - ORIENTATION: ORIENTATION: LEFT

## 2021-04-12 NOTE — PROGRESS NOTES
mmol Intravenous Once    sodium chloride flush  5-40 mL Intravenous 2 times per day    [Held by provider] heparin flush  3 mL Intravenous 2 times per day    meropenem  1,000 mg Intravenous Q8H    tobramycin  300 mg Inhalation Q12H    ibuprofen  200 mg Oral TID WC    famotidine  20 mg Oral Daily    melatonin  9 mg Oral Nightly    zinc sulfate  50 mg Oral Daily    [Held by provider] enoxaparin  40 mg Subcutaneous Daily    Arformoterol Tartrate  15 mcg Nebulization BID    budesonide  250 mcg Nebulization BID    ipratropium-albuterol  1 ampule Inhalation Q4H WA    rifAMPin  600 mg Oral Q48H    ethambutol  800 mg Oral Daily    ciprofloxacin  750 mg Oral 2 times per day       VITAL SIGNS:                                                                                                                          /88   Pulse 102   Temp 99.4 °F (37.4 °C) (Oral)   Resp 22   Ht 6' 1\" (1.854 m)   Wt 200 lb 1.6 oz (90.8 kg)   SpO2 91%   BMI 26.40 kg/m²   Patient Vitals for the past 96 hrs (Last 3 readings):   Weight   04/12/21 0117 200 lb 1.6 oz (90.8 kg)   04/11/21 0056 200 lb 12.8 oz (91.1 kg)   04/10/21 0153 205 lb (93 kg)     OBJECTIVE:    HEENT: PERRL, EOM  Intact; sclera non-icteric, conjunctiva pink. Carotids are brisk in upstroke with normal contour. No carotid bruits. Normal jugular venous pulsation at 45°. No palpable cervical nor supraclavicular nodes. Thyroid not palpable. Trachea midline. Chest: Even excursion  Lungs: Few crackles bilaterally, no decreased tactile fremitus without inspiratory rales. Heart: Regular  rhythm; S1 > S2, no gallop or murmur. No clicks, rub, palpable thrills   or heaves. PMI nondisplaced, 5th intercostal space MCL. Abdomen: Soft, nontender, nondistended,  grossly protuberant, no masses or organomegaly. Bowel sounds active. Extremities: Without clubbing, cyanosis or edema.  Pulses present 3+ upper extermities bilaterally; present 2+ DP and present 1+ PT bilaterally. Data:   Scheduled Meds: Reviewed  Continuous Infusions:    sodium chloride 25 mL (04/11/21 0234)    sodium chloride 33.3 mL/hr at 04/12/21 1150    sodium chloride 100 mL/hr at 04/12/21 0902       Intake/Output Summary (Last 24 hours) at 4/12/2021 1220  Last data filed at 4/12/2021 1201  Gross per 24 hour   Intake 2345 ml   Output 3140 ml   Net -795 ml     CBC:   Recent Labs     04/10/21  0249 04/11/21  0330 04/12/21  0425   WBC 10.2 8.2 8.6   HGB 15.4 14.5 15.3   HCT 45.9 43.0 45.0    172 235     BMP:  Recent Labs     04/10/21  0249 04/11/21  0330 04/12/21  0425    134 134   K 3.4* 3.3* 3.7   CL 97* 98 99   CO2 25 27 29   BUN 8 4* 4*   CREATININE 0.6* 0.4* 0.5*   LABGLOM >60 >60 >60     ABGs:   Lab Results   Component Value Date    PH 7.440 05/28/2019    PO2 59.1 05/28/2019    PCO2 32.1 05/28/2019     INR: No results for input(s): INR in the last 72 hours. PRO-BNP:   Lab Results   Component Value Date    PROBNP 1,132 (H) 04/08/2021    PROBNP 194 (H) 10/15/2020      TSH:   Lab Results   Component Value Date    TSH 1.840 04/08/2021      Cardiac Injury Profile:   No results for input(s): CKTOTAL, CKMB, TROPONINI in the last 72 hours. Lipid Profile:   Lab Results   Component Value Date    TRIG 49 04/08/2021    HDL 42 04/08/2021    LDLCALC 57 04/08/2021    CHOL 109 04/08/2021      Hemoglobin A1C: No components found for: HGBA1C     RAD:   Xr Chest (2 Vw)    Result Date: 4/8/2021  EXAMINATION: TWO XRAY VIEWS OF THE CHEST 4/8/2021 8:13 am COMPARISON: April 7, 2021 HISTORY: ORDERING SYSTEM PROVIDED HISTORY: pneumonia TECHNOLOGIST PROVIDED HISTORY: Reason for exam:->pneumonia FINDINGS: Worsening airspace disease at the left base likely representing pneumonia. Stable pneumonia or fibrosis at the right base. Pulmonary emphysema is also present. Worsening pneumonia on the left. Pneumonia and or fibrosis on the right. Emphysema.      Xr Chest 1 View    Result Date: 4/7/2021  EXAMINATION: ONE XRAY VIEW OF THE CHEST 4/7/2021 10:51 am COMPARISON: 02/02/2021 HISTORY: ORDERING SYSTEM PROVIDED HISTORY: Cough SOB TECHNOLOGIST PROVIDED HISTORY: Latesha Lagos on 4/7/2021 10:52 AM 62 in AP erect portable Fatigue Shortness of Breath (thinks he has pneumonia again) Reason for exam:->Cough SOB FINDINGS: Large lung volumes may again reflect pulmonary emphysema. Normal cardiac silhouette size without definite vascular congestion. Chronic interstitial and alveolar density is again noted in the left lung base. New ground-glass opacity is noted in this region which may reflect acute on chronic process. Stable nonspecific elevation of right diaphragm may be associated with basilar scarring. No pneumothorax or definite pleural effusion. No acute displaced fracture. New ground-glass opacity in the left lung base, in the region of previous chronic interstitial and alveolar density, may reflect superimposed new atelectasis and/or pneumonia. 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 Continue present cardiac medications and observe for arhythmia.      I have spent more than 25 minutes face to face with Thresa Stakes and reviewing notes and laboratory data, with greater than 50% of this time instructing and counseling the patient face to face regarding my findings and recommendations and I have answered all questions as posed to me by Mr. Rupali Miles. Day Moseley, DO FACP,FACC,[facility]      NOTE:  This report was transcribed using voice recognition software.   Every effort was made to ensure accuracy; however, inadvertent computerized transcription errors may be present

## 2021-04-12 NOTE — PROGRESS NOTES
PROGRESS  NOTE --                                                          INTERNAL  MEDICINE                                                                              I  PERSONALLY SAW , EXAMINED, AND CARED Sheila Corona, 4/12/2021     LABS, XRAY ,CHART, AND MEDICATIONS  REVIEWED BY ME       Chief complaint: Cough, fever, shortness of breath      4/9/2021-SUBJECTIVE: Leesa Hopkins is alert awake and cooperative; oriented ×3. Denies any chest pain  nausea emesis. Tolerating diet. No abdominal pain. Dyspnea much improved chest discomfort nearly resolved. Wife is present through the exam. Highest temperature last 24 hours 100.4. Currently 98.9.  92% saturation 15 L high flow nasal cannula. Most recent blood pressure 97/54. Intake and output -1055 cc. Glucose 130. Troponins negative x3. CRP 18.5. Hemoglobin 15.9 WBC 11.5. Sed rate 21. Viral respiratory panel, SARS-CoV-2 all not detected. Legionella and strep antigens presumptive negative. Blood and urine cultures negative to date. Sputum Gram stain as follows--    Gram Stain Orderable 04/08/2021  5:30 PM  - [facility]   Group 5: >25 PMN's/LPF and <10 Epithelial cells/LPF   Moderate Polymorphonuclear leukocytes   Epithelial cells not seen   Few Gram positive cocci in pairs   Few Gram negative rods   Few Gram positive cocci in clusters      Chest x-ray from yesterday as follows--      FINDINGS:   Worsening airspace disease at the left base likely representing pneumonia. Stable pneumonia or fibrosis at the right base.  Pulmonary emphysema is also   present.       Impression:       Worsening pneumonia on the left.  Pneumonia and or fibrosis on the right. Emphysema       Social service note from yesterday appreciated. Patient wears 4 to 5 L while at home; no history of home health care and or skilled nursing facility.   Cardiology consult from yesterday appreciated. Cardiology believes chest discomfort not angina rather due to coughing and soreness of the chest.  Pulmonary note from yesterday, end-stage COPD; right-sided pleural scarring chronic; new bilateral infiltrates likely representing pneumonia, appear worse with hydration. History of Mycobacterium avium complex. ID note from yesterday, patient thinks he is having pleuritic left chest pain. Cefepime changed to meropenem; continue IV ciprofloxacin; start inhaled tobramycin, continue with ethambutol and rifampin, monitor labs. Nursing note from earlier this morning patient using bedside urinal with oxygen off, desaturated to the 70s. Nonrebreather applied since resumption of O2 only resulted in SPO2 of 80%. Patient very anxious because of dyspnea. Pulmonary was called; BiPAP placed, SPO2 95%. Patient early this a.m. requested off BiPAP; placed on 15 L high flow with saturation of 97%. 4/10/2021-patient laying quietly in bed, not having a good day. Still having shortness of breath episode; just had aerosol treatment. Great difficulties overnight with BiPAP. Has little to no appetite. He is having more chest discomfort mostly left-sided. Temperature still elevated 99.9.  94% saturation 15 L high flow nasal cannula. Intake and output -1210 cc. Potassium 3.4 glucose 113 phosphorus 1.2 protein 6.2 albumin 3.1 CRP still elevated but improving 16.3. Hemoglobin 15.4 WBC 10.2. Sed rate 24. PICC line permit has been entered. Pulmonary note from today, minimal reddish sputum. BiPAP only used a few hours last evening felt too strong. Tires quickly. Continue nebulizers wean oxygen as tolerated. ID note from today minimal productive cough still complaining of dyspnea. Continue meropenem and inhaled tobramycin. Continue oral ciprofloxacin and rifampin. Continue aerosol treatments. Patient may need bronchoscopy. 4/11/2021-patient sitting up in bed; still does not feel well.   Continues on 15 L nasal cannula high flow; poor sleep. Appetite has diminished. Still has left-sided chest discomfort, mostly pleuritic and/or ribs from coughing. Afebrile last 24 hours. Pulse 110.  90% saturation 15 L high flow nasal cannula. Intake and output -1525 cc. Potassium 3.3 BUN 4 magnesium 1.7 phosphorus 1.7 CRP 17.2, higher. Albumin 3.0 WBC 8.2 platelets 89.9. Glucose 116. CT scan of chest done this morning with following results--      FINDINGS:   There are advanced emphysematous changes in the lung parenchyma with multiple   bullous formation, some of fair size, with more spread areas of pan-lobular   emphysema pattern. Additional areas of pulmonary fibrosis and retraction with reticulation of   the inter lobular septi are seen throughout both lungs.  Several areas of   peripheral and central scarring are seen with spiculated appearance.  But   this appears to be stable over time, as also observed in the right lower lung   base, where underline pleural reaction is also present on chronic basis. As new finding since the study of October 15, 2020 is the development of a   area of confluent consolidation with somewhat triangular shape configuration,   measuring 6 x 3.6 x 4.4 cm, with the base against the pleural surface as seen   on axial images, located the in the anterior segment of the left upper lobe,   posterolateral to a previous area of a pulmonary parenchyma/pleural scar with   the areas of fibrosis and retraction that is a chronic finding in the left   upper lobe.      This is new finding could be representation of acute the pneumonic process   but a mass lesion will be part of the differential diagnosis, this is new   finding since the study of October 2020.  Short-term interval follow a   clinical treatment trial for pneumonia could be a consideration prior   interventional procedures final interpretation could require correlation with   the histopathology.  Alternative will be evaluation with a PET-CT scan. As observed previously extensive mediastinum adenopathy seen at multiple   compartments.  This is an old finding. There is a small left-sided pleural effusion that was not present on the   previous study of October 2020. Upper abdominal structures are not fully covered.  The spleen appears to be   enlarged.  The liver has borderline size.  Adrenals are not enlarged. There is a hiatal hernia of small size. Visualized bone structures demonstrate no significant findings.       Impression:       1.  Longstanding advanced emphysematous changes in the lung parenchyma with   extensive areas of pulmonary fibrosis, with spiculated scar bilateral which   appears to be overall stable over time back to the CT of October 2019. 2.  New findings since the recent CT chest of October 2020 is development of   a additional triangular consolidation in the left upper lobe which can   represent acute pneumonia versus malignancy.  See above comments and   recommendations. 3. Isabel Thao is also associated the development of a new mild right-sided   pleural effusion which was not observed on the study of October 2020. Cardiology note from yesterday appreciated; chest pain noncardiac. PICC line was placed yesterday. Pulmonary note from today appreciated; patient may need bronchoscopy but may need to be intubated due to oxygen requirements at this time. Will discuss with Steve Bond. ID note from today; still dyspneic still minimally productive cough high flow oxygen continues. Continue meropenem and inhaled tobramycin; p.o. Cipro and rifampin; continue ethambutol. Bronchoscopy may be needed. 4/12/2021-patient laying quietly in bed no chest pain dyspnea continues. Appetite remains poor. Scheduled for bronchoscopy today. Current temperature 99.4. Pulse 102 respirations 22.  91% saturation 15 L nasal cannula high flow. Intake and output -2320 cc.   BUN 4 creatinine 0.5 glucose 107 potassium 3.7 phosphorus 2.4. Protein 6.2 albumin 3.2. CRP slowly improving 11.6. Hemoglobin 15.3 WBC 8.6. Sed rate 28. Respiratory culture, normal tonja present. Patient was placed on AVAPS for the night. BiPAP this morning. Cardiology note from today still feels quite weak somewhat productive cough slightly improved. Occasional chest pain nonexertional; rather with breathing and coughing. ID note from today, patient tolerating antibiotics still dyspneic. Continue meropenem and inhaled tobramycin; continue p.o. ciprofloxacin and rifampin and ethambutol. Bronchoscopy today. Patient did have bronchoscopy performed; I spoke with pulmonologist regarding same. Results of bronchoscopy as follows--    Findings: Massive mucous plugging at the left mainstem. No endobronchial lesions.     Detailed Description of Procedure: The patient was informed of the procedure, consent was obtained. A fiberoptic bronchoscope was passed through the oropharynx. The vocal cords move promptly to the midline. The trachea was then intubated. Upon intubation, thick brown mucus was seen in the trachea. This was aggressively aspirated. The right lung was inspected and appeared normal.  The left lung was then selectively intubated. At the left mainstem bronchus a huge amount of mucus plugging was noted. This occluded the scope. The scope was removed with roughly 15 cm of thick mucous plug hanging from it.     The bronchoscope was then reintroduced and the airways all appeared clear. No evidence of endobronchial lesion was seen on the left. The lung was then selectively intubated and a wash was conducted. Return was excellent.     The patient tolerated procedure well and was sent to recovery in stable condition. Specimens were sent for culture and sensitivity, mycobacterial studies and fungal studies.       Objective:     PHYSICAL EXAM:    VS: /88   Pulse 102   Temp 99.4 °F (37.4 °C) (Oral)   Resp 22   Ht 6' 1\" (1.854 m)   Wt 200 lb 1.6 oz (90.8 kg)   SpO2 91%   BMI 26.40 kg/m²     Labs:   CBC:   Lab Results   Component Value Date    WBC 8.6 04/12/2021    RBC 5.05 04/12/2021    HGB 15.3 04/12/2021    HCT 45.0 04/12/2021    MCV 89.1 04/12/2021    MCH 30.3 04/12/2021    MCHC 34.0 04/12/2021    RDW 14.2 04/12/2021     04/12/2021    MPV 10.6 04/12/2021     CBC with Differential:    Lab Results   Component Value Date    WBC 8.6 04/12/2021    RBC 5.05 04/12/2021    HGB 15.3 04/12/2021    HCT 45.0 04/12/2021     04/12/2021    MCV 89.1 04/12/2021    MCH 30.3 04/12/2021    MCHC 34.0 04/12/2021    RDW 14.2 04/12/2021    NRBC 0.0 04/07/2021    LYMPHOPCT 8.8 04/12/2021    MONOPCT 8.7 04/12/2021    MYELOPCT 0.9 04/07/2021    BASOPCT 0.3 04/12/2021    MONOSABS 0.75 04/12/2021    LYMPHSABS 0.76 04/12/2021    EOSABS 0.35 04/12/2021    BASOSABS 0.03 04/12/2021     Hemoglobin/Hematocrit:    Lab Results   Component Value Date    HGB 15.3 04/12/2021    HCT 45.0 04/12/2021     CMP:    Lab Results   Component Value Date     04/12/2021    K 3.7 04/12/2021    K 3.7 04/07/2021    CL 99 04/12/2021    CO2 29 04/12/2021    BUN 4 04/12/2021    CREATININE 0.5 04/12/2021    GFRAA >60 04/12/2021    LABGLOM >60 04/12/2021    GLUCOSE 107 04/12/2021    PROT 6.2 04/12/2021    LABALBU 3.2 04/12/2021    CALCIUM 9.5 04/12/2021    BILITOT 0.5 04/12/2021    ALKPHOS 63 04/12/2021    AST 17 04/12/2021    ALT 12 04/12/2021     BMP:    Lab Results   Component Value Date     04/12/2021    K 3.7 04/12/2021    K 3.7 04/07/2021    CL 99 04/12/2021    CO2 29 04/12/2021    BUN 4 04/12/2021    LABALBU 3.2 04/12/2021    CREATININE 0.5 04/12/2021    CALCIUM 9.5 04/12/2021    GFRAA >60 04/12/2021    LABGLOM >60 04/12/2021    GLUCOSE 107 04/12/2021     Hepatic Function Panel:    Lab Results   Component Value Date    ALKPHOS 63 04/12/2021    ALT 12 04/12/2021    AST 17 04/12/2021    PROT 6.2 04/12/2021    BILITOT 0.5 04/12/2021    BILIDIR <0.2 04/12/2021    IBILI see below 04/12/2021    LABALBU 3.2 04/12/2021     Ionized Calcium:  No results found for: IONCA  Magnesium:    Lab Results   Component Value Date    MG 1.7 04/12/2021     Phosphorus:    Lab Results   Component Value Date    PHOS 2.4 04/12/2021     LDH:    Lab Results   Component Value Date     01/02/2021     Uric Acid:    Lab Results   Component Value Date    LABURIC 3.4 04/08/2021     PT/INR:    Lab Results   Component Value Date    PROTIME 12.5 05/30/2019    INR 1.1 05/30/2019     Warfarin PT/INR:  No components found for: Idamae Prosper  PTT:    Lab Results   Component Value Date    APTT 31.4 05/30/2019   [APTT}  Troponin:    Lab Results   Component Value Date    TROPONINI <0.01 04/08/2021     Last 3 Troponin:    Lab Results   Component Value Date    TROPONINI <0.01 04/08/2021    TROPONINI <0.01 04/08/2021    TROPONINI <0.01 01/02/2021     U/A:    Lab Results   Component Value Date    COLORU Yellow 04/07/2021    PROTEINU TRACE 04/07/2021    PHUR 5.5 04/07/2021    LABCAST FEW 10/02/2019    WBCUA NONE 04/07/2021    RBCUA NONE 04/07/2021    MUCUS Present 04/07/2021    BACTERIA FEW 04/07/2021    CLARITYU Clear 04/07/2021    SPECGRAV >=1.030 04/07/2021    LEUKOCYTESUR Negative 04/07/2021    UROBILINOGEN 0.2 04/07/2021    BILIRUBINUR MODERATE 04/07/2021    BLOODU TRACE-LYSED 04/07/2021    GLUCOSEU Negative 04/07/2021     HgBA1c:    Lab Results   Component Value Date    LABA1C 5.1 04/08/2021     FLP:    Lab Results   Component Value Date    TRIG 49 04/08/2021    HDL 42 04/08/2021    LDLCALC 57 04/08/2021    LABVLDL 10 04/08/2021     TSH:    Lab Results   Component Value Date    TSH 1.840 04/08/2021     VITAMIN B12: No components found for: B12  FOLATE:    Lab Results   Component Value Date    FOLATE 6.8 04/08/2021        General appearance: Alert, Awake, Oriented times 3, no distress; high flow nasal cannula oxygen in place  Skin: Warm and dry ; no rashes  Head: Normocephalic.  No masses, lesions or tenderness noted  Eyes: Conjunctivae pink, sclera white. PERRL,EOM-I  Ears: External ears normal  Nose/Sinuses: Nares normal. Septum midline. Mucosa normal. No drainage  Oropharynx: Oropharynx clear with no exudate seen  Neck: Supple. No jugular venous distension, lymphadenopathy or thyromegaly Trachea midline  Lungs: Wheezes and rhonchi bilaterally  Heart: S1 S2  Regular rate and rhythm. No rub, murmur or gallop  Abdomen: Soft, non-tender. BS normal. No masses, organomegaly; no rebound or guarding  Extremities: No edema, Peripheral pulses palpable  Musculoskeletal: Muscular strength appears intact. Neuro:  No focal motor defects ; II-XII grossly intact .  GLASER equally    TELEMETRY: REVIEWED--Telemetry: Sinus, occasional PVCs and sinus tachycardia    ASSESSMENT:   Principal Problem:    Pneumonia  Active Problems:    COPD (chronic obstructive pulmonary disease) ([facility])    Bullous emphysema ([facility])    Pulmonary emphysema with fibrosis of lung ([facility])    Thoracic ascending aortic aneurysm ([facility])    Glucose intolerance    SADE (mycobacterium avium-intracellulare) ([facility])    Acute on chronic respiratory failure with hypoxia ([facility])    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 ([facility] [address]    Acute heart failure with preserved ejection fraction ([facility])  Resolved Problems:    Acute respiratory disease due to severe acute respiratory syndrome coronavirus 2 (SARS-CoV-2)      PLAN:  SEE ORDERS      RE  CHANGES AND FINDINGS   Medications reviewed with patient  GI prophylaxis  DVT prophylaxis  Consultants notes reviewed  Aerosol treatments  Ciprofloxacin 750 mg twice daily by mouth  Famotidine 20 mg daily  Ethambutol 800 mg Monday Wednesday Friday  Rifampin 600 mg every 48 hours  Meropenem 1 g IV every 8 hours  Tobramycin 300 mg inhaled every 12 hours  Morphine 2 mg IV every 4 hours as needed for severe pain  Percocet 5 mg every 4 hours as needed for pain  Potassium phosphate 20 mmol IV today  Inhaled per day       Continuous Infusions:   sodium chloride 25 mL (04/11/21 0234)    sodium chloride 33.3 mL/hr at 04/12/21 1150    sodium chloride 100 mL/hr at 04/12/21 0902         Data:       Intake/Output Summary (Last 24 hours) at 4/12/2021 1312  Last data filed at 4/12/2021 1201  Gross per 24 hour   Intake 2345 ml   Output 3140 ml   Net -795 ml       Wt Readings from Last 3 Encounters:   04/12/21 200 lb 1.6 oz (90.8 kg)   03/15/21 195 lb (88.5 kg)   01/26/21 195 lb (88.5 kg)       Labs: Additional    GLUCOSE:No results for input(s): POCGLU in the last 72 hours. BNP:No results found for: BNP    CRP:   Recent Labs     04/10/21  0249 04/11/21  0330 04/12/21  0425   CRP 16.3* 17.2* 11.6*       ESR:  Recent Labs     04/10/21  0249 04/11/21  0330 04/12/21  0425   SEDRATE 24* 37* 28*       RADIOLOGY: REVIEWED AVAILABLE REPORT  CT CHEST WO CONTRAST   Final Result   1. Longstanding advanced emphysematous changes in the lung parenchyma with   extensive areas of pulmonary fibrosis, with spiculated scar bilateral which   appears to be overall stable over time back to the CT of October 2019.      2.  New findings since the recent CT chest of October 2020 is development of   a additional triangular consolidation in the left upper lobe which can   represent acute pneumonia versus malignancy. See above comments and   recommendations. 3.  There is also associated the development of a new mild right-sided   pleural effusion which was not observed on the study of October 2020. XR CHEST (2 VW)   Final Result   Worsening pneumonia on the left. Pneumonia and or fibrosis on the right. Emphysema. XR CHEST 1 VIEW   Final Result   New ground-glass opacity in the left lung base, in the region of previous   chronic interstitial and alveolar density, may reflect superimposed new   atelectasis and/or pneumonia.                    Vaibhav Jaimes DO   1:12 PM     4/12/2021      Voice recognition software used for dictation

## 2021-04-12 NOTE — PROGRESS NOTES
[address] [place] [address] [facility]  [facility]  Progress Note    SUBJECTIVE:  Chief Complaint   Patient presents with    Fatigue    Shortness of Breath     thinks he has pneumonia again     The patient is tolerating antibiotic. He is still dyspneic. She still has a productive sputum. No pleuritic chest pain. No nausea or vomiting. Review of systems:  As stated above in the chief complaint, otherwise negative. Medications:  Scheduled Meds:   potassium phosphate IVPB  30 mmol Intravenous Once    sodium chloride flush  5-40 mL Intravenous 2 times per day    [Held by provider] heparin flush  3 mL Intravenous 2 times per day    meropenem  1,000 mg Intravenous Q8H    tobramycin  300 mg Inhalation Q12H    ibuprofen  200 mg Oral TID WC    famotidine  20 mg Oral Daily    melatonin  9 mg Oral Nightly    zinc sulfate  50 mg Oral Daily    [Held by provider] enoxaparin  40 mg Subcutaneous Daily    Arformoterol Tartrate  15 mcg Nebulization BID    budesonide  250 mcg Nebulization BID    ipratropium-albuterol  1 ampule Inhalation Q4H WA    rifAMPin  600 mg Oral Q48H    ethambutol  800 mg Oral Daily    ciprofloxacin  750 mg Oral 2 times per day     Continuous Infusions:   sodium chloride 25 mL (21 0234)    sodium chloride 33.3 mL/hr at 21 0427    sodium chloride 100 mL/hr at 21 0902     PRN Meds:sodium chloride flush, sodium chloride, heparin flush, trimethobenzamide, nitroGLYCERIN, morphine, potassium chloride **OR** potassium alternative oral replacement **OR** potassium chloride, acetaminophen, oxyCODONE-acetaminophen **AND** oxyCODONE    OBJECTIVE:  /88   Pulse 102   Temp 99.4 °F (37.4 °C) (Oral)   Resp 22   Ht 6' 1\" (1.854 m)   Wt 200 lb 1.6 oz (90.8 kg)   SpO2 91%   BMI 26.40 kg/m²   Temp  Av °F (36.7 °C)  Min: 97.2 °F (36.2 °C)  Max: 99.4 °F (37.4 °C)  Constitutional: The patient is sitting up in bed. Awake and alert. He still appears ill.  Wife present. Skin: Warm and dry. No rashes were noted. HEENT: Round and reactive pupils. Moist mucous membranes. No ulcerations or thrush. Neck: Supple to movements. Chest: No use of accessory muscles to breathe. Symmetrical expansion. Crackles over the left base. 15LNC. Cardiovascular: Heart sounds rhythmic and regular. Abdomen: Positive bowel sounds to auscultation. Benign to palpation. Extremities: No edema. Lines: Right PICC 4/10/2021.     Laboratory and Tests Review:  Lab Results   Component Value Date    WBC 8.6 04/12/2021    WBC 8.2 04/11/2021    WBC 10.2 04/10/2021    HGB 15.3 04/12/2021    HCT 45.0 04/12/2021    MCV 89.1 04/12/2021     04/12/2021     Lab Results   Component Value Date    NEUTROABS 6.71 04/12/2021    NEUTROABS 6.85 04/11/2021    NEUTROABS 8.70 (H) 04/10/2021     No results found for: CRPHS  Lab Results   Component Value Date    ALT 12 04/12/2021    AST 17 04/12/2021    ALKPHOS 63 04/12/2021    BILITOT 0.5 04/12/2021     Lab Results   Component Value Date     04/12/2021    K 3.7 04/12/2021    K 3.7 04/07/2021    CL 99 04/12/2021    CO2 29 04/12/2021    BUN 4 04/12/2021    CREATININE 0.5 04/12/2021    CREATININE 0.4 04/11/2021    CREATININE 0.6 04/10/2021    GFRAA >60 04/12/2021    LABGLOM >60 04/12/2021    GLUCOSE 107 04/12/2021    PROT 6.2 04/12/2021    LABALBU 3.2 04/12/2021    CALCIUM 9.5 04/12/2021    BILITOT 0.5 04/12/2021    ALKPHOS 63 04/12/2021    AST 17 04/12/2021    ALT 12 04/12/2021     Lab Results   Component Value Date    CRP 11.6 (H) 04/12/2021    CRP 17.2 (H) 04/11/2021    CRP 16.3 (H) 04/10/2021     Lab Results   Component Value Date    SEDRATE 28 (H) 04/12/2021    SEDRATE 37 (H) 04/11/2021    SEDRATE 24 (H) 04/10/2021     Radiology:  Chest x-ray- worsening pneumonia on the left     Microbiology:   Respiratory culture 4/8/2021: OP tonja present  Blood cultures 4/7/2021: negative so far  Strep/Legionella urine antigens 4/7/2021: negative  Respiratory Panel with SARS-CoV-2: not detected     No results for input(s): PROCAL in the last 72 hours.     ASSESSMENT:  · Left lower lobe pneumonia/HCAP  · Mycobacterium avium complex chronic lung infection with persistent sputum cultures  · Fever secondary to pneumonia    PLAN:  · Continue Meropenem and inhaled Tobramycin  · Continue po Ciprofloxacin & Rifampin   · Bronchoscopy today    Keron Blackburn  11:39 AM  4/12/2021

## 2021-04-12 NOTE — PROGRESS NOTES
Physical Therapy    PT eval attempted. Pt just returned form bronch and requested to hold at this time. Will re-attempt at later date.

## 2021-04-12 NOTE — CARE COORDINATION
4/12/2021  Social Work Discharge Planning:[facility] today. Awaiting PICC for ATB. Pt is on 15l o2 vs bipap. Awaiting therapy eval. CM made referral to [facility]. From home with spouse.  Electronically signed by MALVIN Burnett on 4/12/2021 at 9:25 AM

## 2021-04-12 NOTE — PROGRESS NOTES
Occupational Therapy      Occupational Therapy referral received.    Attempt made this pm   Patient recently returned from bronchoscopy - preferred to remain in bed and rest   Will try back another date- patient agreed

## 2021-04-12 NOTE — ANESTHESIA PRE PROCEDURE
Department of Anesthesiology  Preprocedure Note       Name:  Wenceslao Young   Age:  78 y.o.  :  1950                                          MRN:  [id number]         Date:  2021      Surgeon: Gladis Weems):  Britney López MD    Procedure: BRONCHOSCOPY (N/A )    Medications prior to admission:   Prior to Admission medications    Medication Sig Start Date End Date Taking? Authorizing Provider   etodolac (LODINE) 500 MG tablet Take 500 mg by mouth daily   Yes Historical Provider, MD CASTID-19 mRNA Vacc, Moderna, 100 MCG/0.5ML SUSP injection Inject 0.5 mLs into the muscle once *FIRST DOSE: 2021  SECOND DOSE: 2021*   Yes Historical Provider, MD   moxifloxacin (AVELOX) 400 MG tablet Take 1 tablet by mouth daily Do not take Clarithromycin 3/4/21 6/2/21 Yes Tara Renae MD   famotidine (PEPCID) 20 MG tablet Take 1 tablet by mouth daily 21  Yes Wesley Yip MD   oxyCODONE-acetaminophen (PERCOCET) 7.5-325 MG per tablet Take 1 tablet by mouth 2 times daily as needed for Pain.   3/11/20  Yes Historical Provider, MD   Fluticasone-Umeclidin-Vilant (Dorise Umm) 100-62.5-25 MCG/INH AEPB Inhale 1 puff into the lungs daily 19  Yes MIGUE Mireles - CNP   OXYGEN Inhale 3.5 L into the lungs continuous prn    Yes Historical Provider, MD   rifAMPin (RIFADIN) 300 MG capsule Take 2 capsules by mouth three times a week Every --F 3/5/21 6/3/21  Tara Renae MD   ethambutol (MYAMBUTOL) 400 MG tablet Take 2 tablets by mouth three times a week Every M-W-F 3/5/21 6/3/21  Tara Renae MD   melatonin 3 MG TABS tablet Take 3 tablets by mouth nightly 21   Wesley Yip MD   albuterol sulfate  (90 Base) MCG/ACT inhaler Inhale 2 puffs into the lungs every 6 hours as needed for Wheezing or Shortness of Breath    Historical Provider, MD       Current medications:    Current Facility-Administered Medications   Medication Dose Route Frequency Provider Last Rate Last Admin    potassium phosphate 30 mmol in dextrose 5 % 500 mL IVPB  30 mmol Intravenous Once Sol Fruits Mihok,  mL/hr at 04/11/21 2124 Restarted at 04/11/21 2124    sodium chloride flush 0.9 % injection 5-40 mL  5-40 mL Intravenous 2 times per day Roz Barger MD   10 mL at 04/12/21 0901    sodium chloride flush 0.9 % injection 5-40 mL  5-40 mL Intravenous PRN Roz Barger MD        0.9 % sodium chloride infusion  25 mL Intravenous PRN Roz Barger  mL/hr at 04/11/21 0234 25 mL at 04/11/21 0234    [Held by provider] heparin flush 100 UNIT/ML injection 300 Units  3 mL Intravenous 2 times per day Roz Barger MD        heparin flush 100 UNIT/ML injection 300 Units  3 mL Intercatheter PRN Roz Barger MD        trimethobenzamide Thierry Cortez) injection 200 mg  200 mg Intramuscular Q6H PRN Sol Fruits Mihok, DO        nitroGLYCERIN (NITROSTAT) SL tablet 0.4 mg  0.4 mg Sublingual Q5 Min PRN Sol Fruits Mihok, DO        morphine (PF) injection 2 mg  2 mg Intravenous Q4H PRN Sol Fruits Mihok, DO        meropenem (MERREM) 1,000 mg in sodium chloride 0.9 % 100 mL IVPB (mini-bag)  1,000 mg Intravenous Q8H Roz Barger MD   Stopped at 04/12/21 1159    0.9 % sodium chloride infusion   Intravenous Q8H Roz Barger MD 33.3 mL/hr at 04/12/21 1150 New Bag at 04/12/21 1150    tobramycin (NEBCIN) 300 mg  300 mg Inhalation Q12H Roz Barger MD   300 mg at 04/12/21 0751    ibuprofen (ADVIL;MOTRIN) tablet 200 mg  200 mg Oral TID WC Jim Jaimes, DO   200 mg at 04/12/21 0900    famotidine (PEPCID) tablet 20 mg  20 mg Oral Daily Jim Jaimes, DO   20 mg at 04/12/21 0900    melatonin tablet 9 mg  9 mg Oral Nightly Jim Jaimes, DO   9 mg at 04/11/21 2138    zinc sulfate (ZINCATE) capsule 50 mg  50 mg Oral Daily Jim Jaimes DO   50 mg at 04/12/21 0901    0.9 % sodium chloride infusion   Intravenous Continuous Sol Rhoda Jaimes  mL/hr at 04/12/21 0902 New Bag at 04/12/21 0902    [Held by provider] enoxaparin (LOVENOX) injection 40 mg  40 mg Subcutaneous Daily Jim Jaimes, DO   40 mg at 04/10/21 1838    potassium chloride (KLOR-CON M) extended release tablet 40 mEq  40 mEq Oral PRN Gilford Ivans Mihok, DO   40 mEq at 04/11/21 0541    Or    potassium bicarb-citric acid (EFFER-K) effervescent tablet 40 mEq  40 mEq Oral PRN Gilford Ivans Mihok, DO        Or    potassium chloride 10 mEq/100 mL IVPB (Peripheral Line)  10 mEq Intravenous PRN Gilford Ivans Mihok, DO        Arformoterol Tartrate (BROVANA) nebulizer solution 15 mcg  15 mcg Nebulization BID Gilford Ivans Mihok, DO   15 mcg at 04/12/21 0749    budesonide (PULMICORT) nebulizer suspension 250 mcg  250 mcg Nebulization BID Gilford Ivans Mihok, DO   250 mcg at 04/12/21 0749    ipratropium-albuterol (DUONEB) nebulizer solution 1 ampule  1 ampule Inhalation Q4H WA Jim Jaimes, DO   1 ampule at 04/12/21 0749    acetaminophen (TYLENOL) tablet 650 mg  650 mg Oral Q4H PRN Gilford Ivans Mihok, DO   650 mg at 04/10/21 2898    rifAMPin (RIFADIN) capsule 600 mg  600 mg Oral Q48H Caroline Mark MD   600 mg at 04/11/21 0941    ethambutol (MYAMBUTOL) tablet 800 mg  800 mg Oral Daily Caroline Mark MD   800 mg at 04/12/21 0900    ciprofloxacin (CIPRO) tablet 750 mg  750 mg Oral 2 times per day Carolnie Mark MD   750 mg at 04/12/21 0030    oxyCODONE-acetaminophen (PERCOCET) 5-325 MG per tablet 1 tablet  1 tablet Oral Q4H PRN Gilford Ivans Mihok, DO   1 tablet at 04/11/21 2138    And    oxyCODONE (ROXICODONE) immediate release tablet 2.5 mg  2.5 mg Oral Q4H PRN Gilford Ivans Mihok, DO   2.5 mg at 04/11/21 2138       Allergies:  No Known Allergies    Problem List:    Patient Active Problem List   Diagnosis Code    COPD (chronic obstructive pulmonary disease) ([facility] J44.9    Closed displaced fracture of second metatarsal bone of right foot with routine healing S92.321D    COPD exacerbation ([facility] J44.1    Bullous emphysema ([facility] J43.9    Pulmonary emphysema with fibrosis of lung ([address] J43.9, J84.10    Thoracic ascending aortic aneurysm ([facility]) I71.2    Pneumonia due to infectious organism J18.9    Cough with hemoptysis R04.2    Pneumonia, bacterial J15.9    S/P lumbar fusion Z98.1    Sepsis ([facility]) A41.9    Infection due to parainfluenza virus 3 B34.8    Hypophosphatemia E83.39    Glucose intolerance E74.39    Hilar adenopathy R59.0    SADE (mycobacterium avium-intracellulare) ([facility]) A31.0    Acute on chronic respiratory failure with hypoxia ([facility]) J96.21    Pleural effusion on right J90    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 ([address] A31.2    Chest pain R07.9    Rhinovirus infection B34.8    Acute hypoxemic respiratory failure ([facility]) J96.01    Hypoxia R09.02    Pneumonia J18.9    Acute heart failure with preserved ejection fraction ([facility]) I50.31       Past Medical History:        Diagnosis Date    Acute and chronic respiratory failure with hypoxia ([address] 10/12/2017    Acute heart failure with preserved ejection fraction ([address] 4/8/2021    Acute respiratory disease due to severe acute respiratory syndrome coronavirus 2 (SARS-CoV-2) 01/01/2021    Acute respiratory failure with hypoxia ([facility]) 11/12/2018    Bullous emphysema ([address] 11/12/2018    Chronic back pain     Degeneration of umbar intervertebral disc    Colon cancer screening     COPD (chronic obstructive pulmonary disease) ([facility])     Cough with hemoptysis 04/23/2019    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 ([address] 08/15/2019    First seen by ID; treatment started 9/4/2019    Emphysema lung ([address]     Glucose intolerance 06/10/2019    Hilar adenopathy 06/10/2019    Hypophosphatemia 06/10/2019    Infection due to parainfluenza virus 3 06/10/2019    Left upper lobe pneumonia 10/12/2017    Pleural effusion on right 10/03/2019    Pulmonary emphysema with fibrosis of lung ([address] 11/15/2018    Pulmonary Mycobacterium avium complex ([facility] 2019    BAL results finally completed this date    Rhinovirus infection 10/16/2020    Right lower lobe pneumonia 11/15/2018    Recurrent 19    S/P lumbar fusion 2019    Thoracic ascending aortic aneurysm ([facility] 11/15/2018    Proximal ascending aorta; 3.8 cm; 11/15/18       Past Surgical History:        Procedure Laterality Date    ABSCESS DRAINAGE  2006    X2 RECTAL ABSCESS    BRONCHOSCOPY N/A 2019    BRONCHOSCOPY BRUSHINGS performed by Merrick Thakkar MD at [address] [address] [facility] [address] N/A 10/7/2019    BRONCHOSCOPY DIAGNOSTIC OR CELL [address] ONLY performed by Merrick Thakkar MD at [address] COLONOSCOPY  2013    HC INSERT PICC CATH, 5/> YRS  4/10/2021         LUMBAR FUSION  2019    [facility]       Social History:    Social History     Tobacco Use    Smoking status: Former Smoker     Packs/day: 2.00     Years: 46.00     Pack years: 92.00     Types: Cigarettes     Start date: 10/12/1968     Quit date: 10/12/2014     Years since quittin.5    Smokeless tobacco: Never Used   Substance Use Topics    Alcohol use:  No                                Counseling given: Not Answered      Vital Signs (Current):   Vitals:    21 0117 21 0341 21 0754 21 0845   BP:    130/88   Pulse:    102   Resp:    22   Temp:    99.4 °F (37.4 °C)   TempSrc:    Oral   SpO2:  95% 94% 91%   Weight: 200 lb 1.6 oz (90.8 kg)      Height:                                                  BP Readings from Last 3 Encounters:   21 130/88   03/15/21 120/80   21 136/67       NPO Status: Time of last liquid consumption:                         Time of last solid consumption:                         Date of last liquid consumption: 21                        Date of last solid food consumption: 21    BMI:   Wt Readings from Last 3 Encounters:   21 200 lb 1.6 oz (90.8 kg)   03/15/21 195 lb (88.5 kg)   01/26/21 195 lb (88.5 kg)     Body mass index is 26.4 kg/m². CBC:   Lab Results   Component Value Date    WBC 8.6 04/12/2021    RBC 5.05 04/12/2021    HGB 15.3 04/12/2021    HCT 45.0 04/12/2021    MCV 89.1 04/12/2021    RDW 14.2 04/12/2021     04/12/2021       CMP:   Lab Results   Component Value Date     04/12/2021    K 3.7 04/12/2021    K 3.7 04/07/2021    CL 99 04/12/2021    CO2 29 04/12/2021    BUN 4 04/12/2021    CREATININE 0.5 04/12/2021    GFRAA >60 04/12/2021    LABGLOM >60 04/12/2021    GLUCOSE 107 04/12/2021    PROT 6.2 04/12/2021    CALCIUM 9.5 04/12/2021    BILITOT 0.5 04/12/2021    ALKPHOS 63 04/12/2021    AST 17 04/12/2021    ALT 12 04/12/2021       POC Tests: No results for input(s): POCGLU, POCNA, POCK, POCCL, POCBUN, POCHEMO, POCHCT in the last 72 hours. Coags:   Lab Results   Component Value Date    PROTIME 12.5 05/30/2019    INR 1.1 05/30/2019    APTT 31.4 05/30/2019       HCG (If Applicable): No results found for: PREGTESTUR, PREGSERUM, HCG, HCGQUANT     ABGs: No results found for: PHART, PO2ART, CYU1NQV, VBT1LZB, BEART, K0GWJGHG     Type & Screen (If Applicable):  No results found for: LABABO, [address]    Anesthesia Evaluation  Patient summary reviewed and Nursing notes reviewed no history of anesthetic complications:   Airway: Mallampati: III  TM distance: >3 FB   Neck ROM: full  Mouth opening: > = 3 FB Dental: normal exam         Pulmonary:   (+) pneumonia (Worsening pneumonia on the left.  Pneumonia and or fibrosis on the right.): unresolved,  COPD (Bullous emphysema ([address])): severe,  shortness of breath: chronic,  sleep apnea: on CPAP,  rhonchi,  decreased breath sounds,      Smoker: Former smoker, 92 pack years, quit 2014.                           ROS comment: Acute on chronic respiratory failure with hypoxia (HCC)  Former Smoker   Cardiovascular:Negative CV ROS  Exercise tolerance: poor (<4 METS),   (+) CHF (Acute heart failure with preserved ejection fraction [facility])):,       ECG reviewed  Rhythm: regular  Rate: normal  Echocardiogram reviewed  Stress test reviewed       Beta Blocker:  Not on Beta Blocker      ROS comment: Echo 5/2019:  Luba Chris   Left ventricle grossly normal in size.   Mild left ventricular concentric hypertrophy noted.   Normal LV segmental wall motion.   Estimated left ventricular ejection fraction is 60±5%.   <50% criteria for diastolic dysfunction.   The LAESV Index is <34ml/m2.   Moderately dilated right ventricle.   Right ventricle global systolic function is normal .   Physiologic and/or trace mitral regurgitation is present.   Trace aortic regurgitation is noted.   Physiologic and/or trace tricuspid regurgitation.  RVSP is 40 mmHg.   Technically good quality study.   Compared to prior echo, no significant changes noted.   Suggest clinical correlation. EKG 10/2019: NSR with occasional PVCs     Neuro/Psych:   Negative Neuro/Psych ROS              GI/Hepatic/Renal: Neg GI/Hepatic/Renal ROS            Endo/Other: Negative Endo/Other ROS                    Abdominal:           Vascular:   + PVD, aortic or cerebral (Thoracic ascending aortic aneurysm ), . Anesthesia Plan      MAC     ASA 4       Induction: intravenous. Anesthetic plan and risks discussed with patient. Plan discussed with CRNA. Tessy Mata MD   4/12/2021        Patient seen and evaluated. Risks and benefits of MAC discussed with patient. All patient's questions answered to his satisfaction. Patient agrees to proceed with MAC anesthetic.   Dario Tanner  4/12/21

## 2021-04-12 NOTE — OP NOTE
Operative Note      Patient: Wilbur Bedolla  YOB: 1950  MRN: [id number]    Date of Procedure: 4/12/2021    Pre-Op Diagnosis: Dense lingular infiltrate    Post-Op Diagnosis: Same       Procedure(s):  BRONCHOSCOPY    Surgeon(s):  Merrick Thakkar MD    Assistant:   * No surgical staff found *    Anesthesia: Monitor Anesthesia Care    Estimated Blood Loss (mL): Minimal    Complications: None    Specimens:   * No specimens in log *    Implants:  * No implants in log *      Drains: * No LDAs found *    Findings: Massive mucous plugging at the left mainstem. No endobronchial lesions. Detailed Description of Procedure: The patient was informed of the procedure, consent was obtained. A fiberoptic bronchoscope was passed through the oropharynx. The vocal cords move promptly to the midline. The trachea was then intubated. Upon intubation, thick brown mucus was seen in the trachea. This was aggressively aspirated. The right lung was inspected and appeared normal.  The left lung was then selectively intubated. At the left mainstem bronchus a huge amount of mucus plugging was noted. This occluded the scope. The scope was removed with roughly 15 cm of thick mucous plug hanging from it. The bronchoscope was then reintroduced and the airways all appeared clear. No evidence of endobronchial lesion was seen on the left. The lung was then selectively intubated and a wash was conducted. Return was excellent. The patient tolerated procedure well and was sent to recovery in stable condition. Specimens were sent for culture and sensitivity, mycobacterial studies and fungal studies.           Electronically signed by Merrick Thakkar MD on 4/12/2021 at 12:41 PM

## 2021-04-13 VITALS
HEART RATE: 99 BPM | HEIGHT: 73 IN | TEMPERATURE: 97.3 F | OXYGEN SATURATION: 90 % | WEIGHT: 200 LBS | SYSTOLIC BLOOD PRESSURE: 101 MMHG | RESPIRATION RATE: 20 BRPM | BODY MASS INDEX: 26.51 KG/M2 | DIASTOLIC BLOOD PRESSURE: 70 MMHG

## 2021-04-13 LAB
ALBUMIN SERPL-MCNC: 3.1 G/DL (ref 3.5–5.2)
ALP BLD-CCNC: 66 U/L (ref 40–129)
ALT SERPL-CCNC: 10 U/L (ref 0–40)
ANION GAP SERPL CALCULATED.3IONS-SCNC: 9 MMOL/L (ref 7–16)
AST SERPL-CCNC: 18 U/L (ref 0–39)
BASOPHILS ABSOLUTE: 0.03 E9/L (ref 0–0.2)
BASOPHILS RELATIVE PERCENT: 0.3 % (ref 0–2)
BILIRUB SERPL-MCNC: 0.5 MG/DL (ref 0–1.2)
BILIRUBIN DIRECT: <0.2 MG/DL (ref 0–0.3)
BILIRUBIN, INDIRECT: ABNORMAL MG/DL (ref 0–1)
BUN BLDV-MCNC: 4 MG/DL (ref 8–23)
C-REACTIVE PROTEIN: 12.3 MG/DL (ref 0–0.4)
CALCIUM SERPL-MCNC: 9.3 MG/DL (ref 8.6–10.2)
CHLORIDE BLD-SCNC: 95 MMOL/L (ref 98–107)
CO2: 30 MMOL/L (ref 22–29)
CREAT SERPL-MCNC: 0.5 MG/DL (ref 0.7–1.2)
EOSINOPHILS ABSOLUTE: 0.25 E9/L (ref 0.05–0.5)
EOSINOPHILS RELATIVE PERCENT: 2.1 % (ref 0–6)
GFR AFRICAN AMERICAN: >60
GFR NON-AFRICAN AMERICAN: >60 ML/MIN/1.73
GLUCOSE BLD-MCNC: 104 MG/DL (ref 74–99)
GRAM STAIN ORDERABLE: NORMAL
HCT VFR BLD CALC: 43.9 % (ref 37–54)
HEMOGLOBIN: 14.9 G/DL (ref 12.5–16.5)
IMMATURE GRANULOCYTES #: 0.08 E9/L
IMMATURE GRANULOCYTES %: 0.7 % (ref 0–5)
LYMPHOCYTES ABSOLUTE: 1.09 E9/L (ref 1.5–4)
LYMPHOCYTES RELATIVE PERCENT: 9.3 % (ref 20–42)
MAGNESIUM: 1.7 MG/DL (ref 1.6–2.6)
MCH RBC QN AUTO: 30.5 PG (ref 26–35)
MCHC RBC AUTO-ENTMCNC: 33.9 % (ref 32–34.5)
MCV RBC AUTO: 89.8 FL (ref 80–99.9)
MONOCYTES ABSOLUTE: 0.93 E9/L (ref 0.1–0.95)
MONOCYTES RELATIVE PERCENT: 7.9 % (ref 2–12)
NEUTROPHILS ABSOLUTE: 9.32 E9/L (ref 1.8–7.3)
NEUTROPHILS RELATIVE PERCENT: 79.7 % (ref 43–80)
PDW BLD-RTO: 13.9 FL (ref 11.5–15)
PHOSPHORUS: 2.3 MG/DL (ref 2.5–4.5)
PLATELET # BLD: 259 E9/L (ref 130–450)
PMV BLD AUTO: 10.1 FL (ref 7–12)
POTASSIUM SERPL-SCNC: 3.8 MMOL/L (ref 3.5–5)
RBC # BLD: 4.89 E12/L (ref 3.8–5.8)
SEDIMENTATION RATE, ERYTHROCYTE: 22 MM/HR (ref 0–15)
SODIUM BLD-SCNC: 134 MMOL/L (ref 132–146)
TOTAL PROTEIN: 6 G/DL (ref 6.4–8.3)
WBC # BLD: 11.7 E9/L (ref 4.5–11.5)

## 2021-04-13 PROCEDURE — 94761 N-INVAS EAR/PLS OXIMETRY MLT: CPT

## 2021-04-13 PROCEDURE — 6370000000 HC RX 637 (ALT 250 FOR IP): Performed by: INTERNAL MEDICINE

## 2021-04-13 PROCEDURE — 2700000000 HC OXYGEN THERAPY PER DAY

## 2021-04-13 PROCEDURE — 2580000003 HC RX 258: Performed by: INTERNAL MEDICINE

## 2021-04-13 PROCEDURE — 85025 COMPLETE CBC W/AUTO DIFF WBC: CPT

## 2021-04-13 PROCEDURE — 6360000002 HC RX W HCPCS: Performed by: INTERNAL MEDICINE

## 2021-04-13 PROCEDURE — 86140 C-REACTIVE PROTEIN: CPT

## 2021-04-13 PROCEDURE — 80076 HEPATIC FUNCTION PANEL: CPT

## 2021-04-13 PROCEDURE — 94660 CPAP INITIATION&MGMT: CPT

## 2021-04-13 PROCEDURE — 36592 COLLECT BLOOD FROM PICC: CPT

## 2021-04-13 PROCEDURE — 84100 ASSAY OF PHOSPHORUS: CPT

## 2021-04-13 PROCEDURE — 83735 ASSAY OF MAGNESIUM: CPT

## 2021-04-13 PROCEDURE — 94640 AIRWAY INHALATION TREATMENT: CPT

## 2021-04-13 PROCEDURE — 85651 RBC SED RATE NONAUTOMATED: CPT

## 2021-04-13 PROCEDURE — 36415 COLL VENOUS BLD VENIPUNCTURE: CPT

## 2021-04-13 PROCEDURE — 80048 BASIC METABOLIC PNL TOTAL CA: CPT

## 2021-04-13 RX ORDER — CIPROFLOXACIN 750 MG/1
750 TABLET, FILM COATED ORAL EVERY 12 HOURS SCHEDULED
Qty: 20 TABLET | Refills: 0 | DISCHARGE
Start: 2021-04-14 | End: 2021-04-24

## 2021-04-13 RX ORDER — IPRATROPIUM BROMIDE AND ALBUTEROL SULFATE 2.5; .5 MG/3ML; MG/3ML
3 SOLUTION RESPIRATORY (INHALATION)
Qty: 360 ML | Status: ON HOLD | DISCHARGE
Start: 2021-04-13 | End: 2022-04-14 | Stop reason: SDUPTHER

## 2021-04-13 RX ORDER — ARFORMOTEROL TARTRATE 15 UG/2ML
15 SOLUTION RESPIRATORY (INHALATION) 2 TIMES DAILY
Qty: 120 ML | Refills: 3 | DISCHARGE
Start: 2021-04-13 | End: 2021-05-13

## 2021-04-13 RX ORDER — TOBRAMYCIN SULFATE 40 MG/ML
300 INJECTION, SOLUTION INTRAMUSCULAR; INTRAVENOUS EVERY 12 HOURS
Qty: 40 ML | Refills: 0 | DISCHARGE
Start: 2021-04-13 | End: 2021-05-13

## 2021-04-13 RX ORDER — IBUPROFEN 200 MG
200 TABLET ORAL
Qty: 120 TABLET | Refills: 3 | DISCHARGE
Start: 2021-04-13 | End: 2021-05-13

## 2021-04-13 RX ORDER — BUDESONIDE 0.25 MG/2ML
250 INHALANT ORAL 2 TIMES DAILY
Qty: 60 AMPULE | Refills: 3 | DISCHARGE
Start: 2021-04-13 | End: 2021-05-13

## 2021-04-13 RX ADMIN — IPRATROPIUM BROMIDE AND ALBUTEROL SULFATE 1 AMPULE: .5; 3 SOLUTION RESPIRATORY (INHALATION) at 15:33

## 2021-04-13 RX ADMIN — SODIUM CHLORIDE: 9 INJECTION, SOLUTION INTRAVENOUS at 11:34

## 2021-04-13 RX ADMIN — ZINC SULFATE 220 MG (50 MG) CAPSULE 50 MG: CAPSULE at 08:45

## 2021-04-13 RX ADMIN — TOBRAMYCIN 300 MG: 40 INJECTION INTRAMUSCULAR; INTRAVENOUS at 08:49

## 2021-04-13 RX ADMIN — CIPROFLOXACIN HYDROCHLORIDE 750 MG: 750 TABLET, FILM COATED ORAL at 00:25

## 2021-04-13 RX ADMIN — IPRATROPIUM BROMIDE AND ALBUTEROL SULFATE 1 AMPULE: .5; 3 SOLUTION RESPIRATORY (INHALATION) at 11:54

## 2021-04-13 RX ADMIN — IPRATROPIUM BROMIDE AND ALBUTEROL SULFATE 1 AMPULE: .5; 3 SOLUTION RESPIRATORY (INHALATION) at 08:34

## 2021-04-13 RX ADMIN — IBUPROFEN 200 MG: 200 TABLET, FILM COATED ORAL at 11:29

## 2021-04-13 RX ADMIN — MEROPENEM 1000 MG: 1 INJECTION, POWDER, FOR SOLUTION INTRAVENOUS at 00:24

## 2021-04-13 RX ADMIN — IBUPROFEN 200 MG: 200 TABLET, FILM COATED ORAL at 08:45

## 2021-04-13 RX ADMIN — RIFAMPIN 600 MG: 300 CAPSULE ORAL at 08:45

## 2021-04-13 RX ADMIN — SODIUM CHLORIDE: 9 INJECTION, SOLUTION INTRAVENOUS at 03:31

## 2021-04-13 RX ADMIN — CIPROFLOXACIN HYDROCHLORIDE 750 MG: 750 TABLET, FILM COATED ORAL at 12:33

## 2021-04-13 RX ADMIN — BUDESONIDE 250 MCG: 0.25 SUSPENSION RESPIRATORY (INHALATION) at 08:34

## 2021-04-13 RX ADMIN — FAMOTIDINE 20 MG: 20 TABLET ORAL at 08:45

## 2021-04-13 RX ADMIN — SODIUM CHLORIDE, PRESERVATIVE FREE 10 ML: 5 INJECTION INTRAVENOUS at 08:45

## 2021-04-13 RX ADMIN — ARFORMOTEROL TARTRATE 15 MCG: 15 SOLUTION RESPIRATORY (INHALATION) at 08:34

## 2021-04-13 RX ADMIN — ETHAMBUTOL HYDROCHLORIDE 800 MG: 400 TABLET ORAL at 08:45

## 2021-04-13 RX ADMIN — SODIUM CHLORIDE: 9 INJECTION, SOLUTION INTRAVENOUS at 05:15

## 2021-04-13 RX ADMIN — MEROPENEM 1000 MG: 1 INJECTION, POWDER, FOR SOLUTION INTRAVENOUS at 08:30

## 2021-04-13 NOTE — PROGRESS NOTES
Occupational Therapy      Occupational Therapy attempt.  Patient lying in bed - reports he just returned from washing up and was feeling fatigued, wanted to rest

## 2021-04-13 NOTE — PROGRESS NOTES
PROGRESS NOTE       PATIENT PROBLEM LIST:  Principal Problem:    Pneumonia  Active Problems:    COPD (chronic obstructive pulmonary disease) ([facility]    Bullous emphysema ([facility]    Pulmonary emphysema with fibrosis of lung ([facility]    Thoracic ascending aortic aneurysm ([facility])    Glucose intolerance    SADE (mycobacterium avium-intracellulare) ([facility])    Acute on chronic respiratory failure with hypoxia (HCC)    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 He continues to experience shortness of breath and a productive cough but states it is slightly improved. He admits to occasional chest pain not associated with exertion but rather with breathing and coughing. REVIEW OF SYSTEMS:  General ROS: negative for - fatigue, malaise, weight loss  Psychological ROS: negative for - anxiety , depression  Ophthalmic ROS: negative for - decreased vision or visual distortion. ENT ROS: negative  Allergy and Immunology ROS: negative  Hematological and Lymphatic ROS: negative  Endocrine: no heat or cold intolerance and no polyphagia, polydipsia, or polyuria  Respiratory ROS: positive for - cough and shortness of breath  Cardiovascular ROS: positive for - chest pain. Gastrointestinal ROS: no abdominal pain, change in bowel habits, or black or bloody stools  Genito-Urinary ROS: no nocturia, dysuria, trouble voiding, frequency or hematuria  Musculoskeletal ROS: negative for- myalgias, arthralgias, or claudication  Neurological ROS: no TIA or stroke symptoms otherwise no significant change in symptoms or problems since yesterday as documented in previous progress notes.     SCHEDULED MEDICATIONS:   potassium phosphate IVPB  30 mmol Intravenous Once    sodium chloride flush  5-40 mL Intravenous 2 times per day    [Held by provider] heparin flush  3 mL Intravenous 2 times per day    meropenem  1,000 mg Intravenous Q8H    tobramycin  300 mg Inhalation Q12H    ibuprofen  200 mg Oral TID WC    famotidine  20 mg Oral Daily    melatonin  9 mg Oral Nightly    zinc sulfate  50 mg Oral Daily    [Held by provider] enoxaparin  40 mg Subcutaneous Daily    Arformoterol Tartrate  15 mcg Nebulization BID    budesonide  250 mcg Nebulization BID    ipratropium-albuterol  1 ampule Inhalation Q4H WA    rifAMPin  600 mg Oral Q48H    ethambutol  800 mg Oral Daily    ciprofloxacin  750 mg Oral 2 times per day       VITAL SIGNS:                                                                                                                          /70   Pulse 99   Temp 97.3 °F (36.3 °C) (Axillary)   Resp 20   Ht 6' 1\" (1.854 m)   Wt 200 lb (90.7 kg)   SpO2 94%   BMI 26.39 kg/m²   Patient Vitals for the past 96 hrs (Last 3 readings):   Weight   04/13/21 0433 200 lb (90.7 kg)   04/12/21 0117 200 lb 1.6 oz (90.8 kg)   04/11/21 0056 200 lb 12.8 oz (91.1 kg)     OBJECTIVE:    HEENT: PERRL, EOM  Intact; sclera non-icteric, conjunctiva pink. Carotids are brisk in upstroke with normal contour. No carotid bruits. Normal jugular venous pulsation at 45°. No palpable cervical nor supraclavicular nodes. Thyroid not palpable. Trachea midline. Chest: Even excursion  Lungs: Decreased bases bilaterally but without expiratory rhonchi at this time. , no decreased tactile fremitus without inspiratory rales. Heart: Regular, irregular rhythm; few ectopics noted. S1 > S2, no gallop or murmur. No clicks, rub, palpable thrills   or heaves. PMI nondisplaced, 5th intercostal space MCL. Abdomen: Soft, nontender, nondistended,  grossly protuberant, no masses or organomegaly. Bowel sounds active. Extremities: Without clubbing, cyanosis or edema.  Pulses present 3+ upper extermities bilaterally; present 2+ DP and present 1+ PT bilaterally. Data:   Scheduled Meds: Reviewed  Continuous Infusions:    sodium chloride 25 mL (04/11/21 0234)    sodium chloride 33.3 mL/hr at 04/13/21 1134    sodium chloride 100 mL/hr at 04/13/21 0515       Intake/Output Summary (Last 24 hours) at 4/13/2021 1138  Last data filed at 4/13/2021 0830  Gross per 24 hour   Intake 2936 ml   Output 575 ml   Net 2361 ml     CBC:   Recent Labs     04/11/21 0330 04/12/21 0425 04/13/21 0521   WBC 8.2 8.6 11.7*   HGB 14.5 15.3 14.9   HCT 43.0 45.0 43.9    235 259     BMP:  Recent Labs     04/11/21 0330 04/12/21 0425 04/13/21 0521    134 134   K 3.3* 3.7 3.8   CL 98 99 95*   CO2 27 29 30*   BUN 4* 4* 4*   CREATININE 0.4* 0.5* 0.5*   LABGLOM >60 >60 >60     ABGs:   Lab Results   Component Value Date    PH 7.440 05/28/2019    PO2 59.1 05/28/2019    PCO2 32.1 05/28/2019     INR: No results for input(s): INR in the last 72 hours. PRO-BNP:   Lab Results   Component Value Date    PROBNP 1,132 (H) 04/08/2021    PROBNP 194 (H) 10/15/2020      TSH:   Lab Results   Component Value Date    TSH 1.840 04/08/2021      Cardiac Injury Profile:   No results for input(s): CKTOTAL, CKMB, TROPONINI in the last 72 hours. Lipid Profile:   Lab Results   Component Value Date    TRIG 49 04/08/2021    HDL 42 04/08/2021    LDLCALC 57 04/08/2021    CHOL 109 04/08/2021      Hemoglobin A1C: No components found for: HGBA1C     RAD:   Xr Chest (2 Vw)    Result Date: 4/8/2021  EXAMINATION: TWO XRAY VIEWS OF THE CHEST 4/8/2021 8:13 am COMPARISON: April 7, 2021 HISTORY: ORDERING SYSTEM PROVIDED HISTORY: pneumonia TECHNOLOGIST PROVIDED HISTORY: Reason for exam:->pneumonia FINDINGS: Worsening airspace disease at the left base likely representing pneumonia. Stable pneumonia or fibrosis at the right base. Pulmonary emphysema is also present. Worsening pneumonia on the left. Pneumonia and or fibrosis on the right. Emphysema.

## 2021-04-13 NOTE — PROGRESS NOTES
PROGRESS  NOTE --                                                          INTERNAL  MEDICINE                                                                              I  PERSONALLY SAW , EXAMINED, AND CARED [address], 4/13/2021     LABS, XRAY ,CHART, AND MEDICATIONS  REVIEWED BY ME       Chief complaint: Cough, fever, shortness of breath      4/9/2021-SUBJECTIVE: Susan Mohr is alert awake and cooperative; oriented ×3. Denies any chest pain  nausea emesis. Tolerating diet. No abdominal pain. Dyspnea much improved chest discomfort nearly resolved. Wife is present through the exam. Highest temperature last 24 hours 100.4. Currently 98.9.  92% saturation 15 L high flow nasal cannula. Most recent blood pressure 97/54. Intake and output -1055 cc. Glucose 130. Troponins negative x3. CRP 18.5. Hemoglobin 15.9 WBC 11.5. Sed rate 21. Viral respiratory panel, SARS-CoV-2 all not detected. Legionella and strep antigens presumptive negative. Blood and urine cultures negative to date. Sputum Gram stain as follows--    Gram Stain Orderable 04/08/2021  5:30 PM  - [facility]   Group 5: >25 PMN's/LPF and <10 Epithelial cells/LPF   Moderate Polymorphonuclear leukocytes   Epithelial cells not seen   Few Gram positive cocci in pairs   Few Gram negative rods   Few Gram positive cocci in clusters      Chest x-ray from yesterday as follows--      FINDINGS:   Worsening airspace disease at the left base likely representing pneumonia. Stable pneumonia or fibrosis at the right base.  Pulmonary emphysema is also   present.       Impression:       Worsening pneumonia on the left.  Pneumonia and or fibrosis on the right. Emphysema       Social service note from yesterday appreciated. Patient wears 4 to 5 L while at home; no history of home health care and or skilled nursing facility.   Cardiology consult from with a PET-CT scan. As observed previously extensive mediastinum adenopathy seen at multiple   compartments.  This is an old finding. There is a small left-sided pleural effusion that was not present on the   previous study of October 2020. Upper abdominal structures are not fully covered.  The spleen appears to be   enlarged.  The liver has borderline size.  Adrenals are not enlarged. There is a hiatal hernia of small size. Visualized bone structures demonstrate no significant findings.       Impression:       1.  Longstanding advanced emphysematous changes in the lung parenchyma with   extensive areas of pulmonary fibrosis, with spiculated scar bilateral which   appears to be overall stable over time back to the CT of October 2019. 2.  New findings since the recent CT chest of October 2020 is development of   a additional triangular consolidation in the left upper lobe which can   represent acute pneumonia versus malignancy.  See above comments and   recommendations. 3. Sekou Jaramillo is also associated the development of a new mild right-sided   pleural effusion which was not observed on the study of October 2020. Cardiology note from yesterday appreciated; chest pain noncardiac. PICC line was placed yesterday. Pulmonary note from today appreciated; patient may need bronchoscopy but may need to be intubated due to oxygen requirements at this time. Will discuss with Josemanuel Osorio. ID note from today; still dyspneic still minimally productive cough high flow oxygen continues. Continue meropenem and inhaled tobramycin; p.o. Cipro and rifampin; continue ethambutol. Bronchoscopy may be needed. 4/12/2021-patient laying quietly in bed no chest pain dyspnea continues. Appetite remains poor. Scheduled for bronchoscopy today. Current temperature 99.4. Pulse 102 respirations 22.  91% saturation 15 L nasal cannula high flow. Intake and output -2320 cc.   BUN 4 creatinine 0.5 glucose 107 potassium 3.7 phosphorus 2.4. Protein 6.2 albumin 3.2. CRP slowly improving 11.6. Hemoglobin 15.3 WBC 8.6. Sed rate 28. Respiratory culture, normal tonja present. Patient was placed on AVAPS for the night. BiPAP this morning. Cardiology note from today still feels quite weak somewhat productive cough slightly improved. Occasional chest pain nonexertional; rather with breathing and coughing. ID note from today, patient tolerating antibiotics still dyspneic. Continue meropenem and inhaled tobramycin; continue p.o. ciprofloxacin and rifampin and ethambutol. Bronchoscopy today. Patient did have bronchoscopy performed; I spoke with pulmonologist regarding same. Results of bronchoscopy as follows--    Findings: Massive mucous plugging at the left mainstem. No endobronchial lesions.     Detailed Description of Procedure: The patient was informed of the procedure, consent was obtained. A fiberoptic bronchoscope was passed through the oropharynx. The vocal cords move promptly to the midline. The trachea was then intubated. Upon intubation, thick brown mucus was seen in the trachea. This was aggressively aspirated. The right lung was inspected and appeared normal.  The left lung was then selectively intubated. At the left mainstem bronchus a huge amount of mucus plugging was noted. This occluded the scope. The scope was removed with roughly 15 cm of thick mucous plug hanging from it.     The bronchoscope was then reintroduced and the airways all appeared clear. No evidence of endobronchial lesion was seen on the left. The lung was then selectively intubated and a wash was conducted. Return was excellent.     The patient tolerated procedure well and was sent to recovery in stable condition. Specimens were sent for culture and sensitivity, mycobacterial studies and fungal studies. 4/13/2021-sitting up in bed, no distress.   Does not feel much better than yesterday; appetite poor still short of breath. Still requires 15 L nasal cannula oxygen. No chest pain. No diarrhea. Afebrile last 24 hours. Still requiring high flow nasal cannula oxygen 15 L/min; SPO2 90. Intake and output +91 cc. CO2 30 glucose 104. Protein 6.0 albumin 3.1. CRP 12.3. WBC 11.7 hemoglobin 14.9 platelets 338. Sed rate improving 22. Gram stain from bronchoscopy--greater than 25 polys; rare epithelial cells no organisms seen.  note from today, patient has been accepted at  [facility]. Cardiology note from today patient still feels weak continues to experience shortness of breath productive cough but is improved. Can be transferred to [facility] within the next 24 hours.       Objective:     PHYSICAL EXAM:    VS: /70   Pulse 99   Temp 97.3 °F (36.3 °C) (Axillary)   Resp 20   Ht 6' 1\" (1.854 m)   Wt 200 lb (90.7 kg)   SpO2 90%   BMI 26.39 kg/m²     Labs:   CBC:   Lab Results   Component Value Date    WBC 11.7 04/13/2021    RBC 4.89 04/13/2021    HGB 14.9 04/13/2021    HCT 43.9 04/13/2021    MCV 89.8 04/13/2021    MCH 30.5 04/13/2021    MCHC 33.9 04/13/2021    RDW 13.9 04/13/2021     04/13/2021    MPV 10.1 04/13/2021     CBC with Differential:    Lab Results   Component Value Date    WBC 11.7 04/13/2021    RBC 4.89 04/13/2021    HGB 14.9 04/13/2021    HCT 43.9 04/13/2021     04/13/2021    MCV 89.8 04/13/2021    MCH 30.5 04/13/2021    MCHC 33.9 04/13/2021    RDW 13.9 04/13/2021    NRBC 0.0 04/07/2021    LYMPHOPCT 9.3 04/13/2021    MONOPCT 7.9 04/13/2021    MYELOPCT 0.9 04/07/2021    BASOPCT 0.3 04/13/2021    MONOSABS 0.93 04/13/2021    LYMPHSABS 1.09 04/13/2021    EOSABS 0.25 04/13/2021    BASOSABS 0.03 04/13/2021     Hemoglobin/Hematocrit:    Lab Results   Component Value Date    HGB 14.9 04/13/2021    HCT 43.9 04/13/2021     CMP:    Lab Results   Component Value Date     04/13/2021    K 3.8 04/13/2021    K 3.7 04/07/2021    CL 95 04/13/2021    CO2 30 04/13/2021    BUN 4 04/13/2021 CREATININE 0.5 04/13/2021    GFRAA >60 04/13/2021    LABGLOM >60 04/13/2021    GLUCOSE 104 04/13/2021    PROT 6.0 04/13/2021    LABALBU 3.1 04/13/2021    CALCIUM 9.3 04/13/2021    BILITOT 0.5 04/13/2021    ALKPHOS 66 04/13/2021    AST 18 04/13/2021    ALT 10 04/13/2021     BMP:    Lab Results   Component Value Date     04/13/2021    K 3.8 04/13/2021    K 3.7 04/07/2021    CL 95 04/13/2021    CO2 30 04/13/2021    BUN 4 04/13/2021    LABALBU 3.1 04/13/2021    CREATININE 0.5 04/13/2021    CALCIUM 9.3 04/13/2021    GFRAA >60 04/13/2021    LABGLOM >60 04/13/2021    GLUCOSE 104 04/13/2021     Hepatic Function Panel:    Lab Results   Component Value Date    ALKPHOS 66 04/13/2021    ALT 10 04/13/2021    AST 18 04/13/2021    PROT 6.0 04/13/2021    BILITOT 0.5 04/13/2021    BILIDIR <0.2 04/13/2021    IBILI see below 04/13/2021    LABALBU 3.1 04/13/2021     Ionized Calcium:  No results found for: IONCA  Magnesium:    Lab Results   Component Value Date    MG 1.7 04/13/2021     Phosphorus:    Lab Results   Component Value Date    PHOS 2.3 04/13/2021     LDH:    Lab Results   Component Value Date     01/02/2021     Uric Acid:    Lab Results   Component Value Date    LABURIC 3.4 04/08/2021     PT/INR:    Lab Results   Component Value Date    PROTIME 12.5 05/30/2019    INR 1.1 05/30/2019     Warfarin PT/INR:  No components found for: PTPATWAR, PTINRWAR  PTT:    Lab Results   Component Value Date    APTT 31.4 05/30/2019   [APTT}  Troponin:    Lab Results   Component Value Date    TROPONINI <0.01 04/08/2021     Last 3 Troponin:    Lab Results   Component Value Date    TROPONINI <0.01 04/08/2021    TROPONINI <0.01 04/08/2021    TROPONINI <0.01 01/02/2021     U/A:    Lab Results   Component Value Date    COLORU Yellow 04/07/2021    PROTEINU TRACE 04/07/2021    PHUR 5.5 04/07/2021    LABCAST FEW 10/02/2019    WBCUA NONE 04/07/2021    RBCUA NONE 04/07/2021    MUCUS Present 04/07/2021    BACTERIA FEW 04/07/2021    CLARITYU 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 ([facility] [address]    Acute heart failure with preserved ejection fraction (HCC)  Resolved Problems:    Acute respiratory disease due to severe acute respiratory syndrome coronavirus 2 (SARS-CoV-2)      PLAN:  SEE ORDERS      RE  CHANGES AND FINDINGS   Medications reviewed with patient  GI prophylaxis  DVT prophylaxis  Consultants notes reviewed  Aerosol treatments  Ciprofloxacin 750 mg twice daily by mouth  Famotidine 20 mg daily  Ethambutol 800 mg Monday Wednesday Friday  Rifampin 600 mg every 48 hours  Meropenem 1 g IV every 8 hours  Tobramycin 300 mg inhaled every 12 hours  Morphine 2 mg IV every 4 hours as needed for severe pain  Percocet 5 mg every 4 hours as needed for pain  Potassium phosphate 20 mmol IV today  Inhaled tobramycin 300 mg every 12 hours  Meropenem 1 g IV every 8 hours  Rifampin 600 mg every 48 hours  Ciprofloxacin 750 mg by mouth twice daily  Ethambutol 800 mg daily at this time  CT chest  Percocet 5 mg every 4 hours as needed for pain  May need bronchoscopy  Continue aerosol treatments  Cipro 750 mg twice daily by mouth  Ethambutol 800 mg daily  Meropenem 1 g IV every 8 hours  Inhaled tobramycin 300 mg every 12 hours  Replace potassium phosphate 30 mmol IV today  Rifampin 600 mg every 48 hours  Monitor labs  Percocet 5 every 4 hours as needed for pain  Likely bronchoscopy  Percocet every 4 hours as needed for pain  Continue aerosol treatments  Cipro 750 mg twice daily by mouth  Ethambutol 800 mg daily  Rifampin 600 mg every 48 hours  Meropenem 1 g IV every 8 hours  Inhaled tobramycin 300 mg every 12 hours  Replace potassium phosphate 20 mmol IV today  Percocet 5 mg every 4 hours as needed, pain  Patient agrees to transfer to [facility]; if okay with others  Meropenem 1 g IV every 8 hours  Ethambutol 800 mg daily  Rifampin 600 mg every 48 hours  Continue aerosol treatments  Cipro 750 mg twice daily  Monitor labs  Percocet 5 mg every 4 hours as needed for pain    Discussed with patient and spouse and nursing. Pepito Jaimes DO     12:11 PM     4/13/2021    TIME > 25 MINUTES    >  50 %  OF  TIME  DISCUSSION               ------------  INFORMATION  -----------      DIET:DIET GENERAL;      No Known Allergies      MEDICATION SIDE EFFECTS:none       SCHEDULED MEDS:                                 Scheduled Meds:   potassium phosphate IVPB  30 mmol Intravenous Once    sodium chloride flush  5-40 mL Intravenous 2 times per day    [Held by provider] heparin flush  3 mL Intravenous 2 times per day    meropenem  1,000 mg Intravenous Q8H    tobramycin  300 mg Inhalation Q12H    ibuprofen  200 mg Oral TID WC    famotidine  20 mg Oral Daily    melatonin  9 mg Oral Nightly    zinc sulfate  50 mg Oral Daily    [Held by provider] enoxaparin  40 mg Subcutaneous Daily    Arformoterol Tartrate  15 mcg Nebulization BID    budesonide  250 mcg Nebulization BID    ipratropium-albuterol  1 ampule Inhalation Q4H WA    rifAMPin  600 mg Oral Q48H    ethambutol  800 mg Oral Daily    ciprofloxacin  750 mg Oral 2 times per day       Continuous Infusions:   sodium chloride 25 mL (04/11/21 0234)    sodium chloride 33.3 mL/hr at 04/13/21 1134    sodium chloride 100 mL/hr at 04/13/21 0515         Data:       Intake/Output Summary (Last 24 hours) at 4/13/2021 1211  Last data filed at 4/13/2021 0830  Gross per 24 hour   Intake 2936 ml   Output 525 ml   Net 2411 ml       Wt Readings from Last 3 Encounters:   04/13/21 200 lb (90.7 kg)   03/15/21 195 lb (88.5 kg)   01/26/21 195 lb (88.5 kg)       Labs: Additional    GLUCOSE:No results for input(s): POCGLU in the last 72 hours.     BNP:No results found for: BNP    CRP:   Recent Labs     04/11/21 0330 04/12/21 0425 04/13/21 0521   CRP 17.2* 11.6* 12.3*       ESR:  Recent Labs     04/11/21 0330 04/12/21 0425 04/13/21 0521   SEDRATE 37* 28* 22*       RADIOLOGY: REVIEWED AVAILABLE REPORT  CT CHEST WO CONTRAST   Final Result   1. Longstanding advanced emphysematous changes in the lung parenchyma with   extensive areas of pulmonary fibrosis, with spiculated scar bilateral which   appears to be overall stable over time back to the CT of October 2019.      2.  New findings since the recent CT chest of October 2020 is development of   a additional triangular consolidation in the left upper lobe which can   represent acute pneumonia versus malignancy. See above comments and   recommendations. 3.  There is also associated the development of a new mild right-sided   pleural effusion which was not observed on the study of October 2020. XR CHEST (2 VW)   Final Result   Worsening pneumonia on the left. Pneumonia and or fibrosis on the right. Emphysema. XR CHEST 1 VIEW   Final Result   New ground-glass opacity in the left lung base, in the region of previous   chronic interstitial and alveolar density, may reflect superimposed new   atelectasis and/or pneumonia.                    [address]   12:11 PM     4/13/2021      Voice recognition software used for dictation

## 2021-04-13 NOTE — CARE COORDINATION
DC to [facility] today- room 746.  Wife Isabel Gamboa [phone] notified of discharge and pt room # Lala Wilkes

## 2021-04-13 NOTE — CARE COORDINATION
4/13/2021  Social Work Discharge Planning:SW was notified that Pt can go to [facility] today , [address] notified nurse. Electronically signed by MALVIN Carbajal on 4/13/2021 at 9:37 AM

## 2021-04-13 NOTE — PROGRESS NOTES
[facility]  [address], [address]  [facility], [facility]      Pulmonary Progress Note      SUBJECTIVE:  Feeling slightly better with respiratory function, similar with cough and slightly less sputum production, bronchoscopy done with large mucus plugging cleared out. On 15 li HFNC, dyspneic with minimal exertion, possible [facility].     OBJECTIVE    Medications    Continuous Infusions:   sodium chloride 25 mL (04/11/21 0234)    sodium chloride 33.3 mL/hr at 04/13/21 1134    sodium chloride 100 mL/hr at 04/13/21 0515       Scheduled Meds:   potassium phosphate IVPB  30 mmol Intravenous Once    sodium chloride flush  5-40 mL Intravenous 2 times per day    [Held by provider] heparin flush  3 mL Intravenous 2 times per day    meropenem  1,000 mg Intravenous Q8H    tobramycin  300 mg Inhalation Q12H    ibuprofen  200 mg Oral TID WC    famotidine  20 mg Oral Daily    melatonin  9 mg Oral Nightly    zinc sulfate  50 mg Oral Daily    [Held by provider] enoxaparin  40 mg Subcutaneous Daily    Arformoterol Tartrate  15 mcg Nebulization BID    budesonide  250 mcg Nebulization BID    ipratropium-albuterol  1 ampule Inhalation Q4H WA    rifAMPin  600 mg Oral Q48H    ethambutol  800 mg Oral Daily    ciprofloxacin  750 mg Oral 2 times per day       PRN Meds:phenylephrine, sodium chloride, sodium chloride flush, sodium chloride, heparin flush, trimethobenzamide, nitroGLYCERIN, morphine, potassium chloride **OR** potassium alternative oral replacement **OR** potassium chloride, acetaminophen, oxyCODONE-acetaminophen **AND** oxyCODONE    Physical    VITALS:  /70   Pulse 99   Temp 97.3 °F (36.3 °C) (Axillary)   Resp 20   Ht 6' 1\" (1.854 m)   Wt 200 lb (90.7 kg)   SpO2 90%   BMI 26.39 kg/m²     24HR INTAKE/OUTPUT:      Intake/Output Summary (Last 24 hours) at 4/13/2021 1436  Last data filed at 4/13/2021 1234  Gross per

## 2021-04-13 NOTE — PROGRESS NOTES
Physical Therapy  Facility/Department: [address] INTERNAL MEDICINE 2    NAME: Lorin Wilkes  : 1950  MRN: [id number]     Chart reviewed and PT tramaineal attempted this am.  Pt declined at this time stating he was too tired. Will check back at later time/date.      Barb Langley, [phone]

## 2021-04-13 NOTE — PLAN OF CARE
Problem: Falls - Risk of:  Goal: Will remain free from falls  Description: Will remain free from falls  Outcome: Completed  Goal: Absence of physical injury  Description: Absence of physical injury  Outcome: Completed     Problem: Health Behavior:  Goal: Ability to identify and utilize available resources and services will improve  Description: Ability to identify and utilize available resources and services will improve  Outcome: Completed     Problem: Role Relationship:  Goal: Ability to interact with others will improve  Description: Ability to interact with others will improve  Outcome: Completed     Problem: Breathing Pattern - Ineffective:  Goal: Ability to achieve and maintain a regular respiratory rate will improve  Description: Ability to achieve and maintain a regular respiratory rate will improve  Outcome: Completed     Problem: Pain:  Goal: Pain level will decrease  Description: Pain level will decrease  Outcome: Completed  Goal: Control of acute pain  Description: Control of acute pain  Outcome: Completed  Goal: Control of chronic pain  Description: Control of chronic pain  Outcome: Completed     Problem: Skin Integrity:  Goal: Will show no infection signs and symptoms  Description: Will show no infection signs and symptoms  Outcome: Completed  Goal: Absence of new skin breakdown  Description: Absence of new skin breakdown  Outcome: Completed

## 2021-04-13 NOTE — CARE COORDINATION
COVID negative 4/7. S/p bronch yesterday. Still requiring O2 15 liters high flow NC. Accepted @ [facility]. Awaiting discharge decision per physicians. Continues on Rodrigo inhal and iv/po abx. Has PICC.  Will follow Rudy Bangura

## 2021-04-13 NOTE — PROGRESS NOTES
Spoke with patient regarding bipap for the night. Continuing to refuse. Explained that high flow is maxed out at 15L and his breathing may need the break the bipap would afford, including nasal passageway tissues.

## 2021-04-13 NOTE — DISCHARGE INSTR - COC
Continuity of Care Form    Patient Name: Rommel Fang   :  1950  MRN:  [id number]    Admit date:  2021  Discharge date:  ***    Code Status Order: Full Code   Advance Directives:   Advance Care Flowsheet Documentation     Date/Time Healthcare Directive Type of Healthcare Directive Copy in [address] Agent's Name Healthcare Agent's Phone Number    21 1124  Yes, patient has an advance directive for healthcare treatment  Durable power of  for health care  --  Spouse  Illla nena Moss  [phone]    21 1619  Yes, patient has an advance directive for healthcare treatment  Durable power of  for health care  --  Spouse  Syed Moss  [phone]          Admitting Physician:  Ranjan Acuña DO  PCP: Pankaj Grande MD    Discharging Nurse: [facility] Unit/Room#: 1659/0979-G  Discharging Unit Phone Number: [phone]    Emergency Contact:   Extended Emergency Contact Information  Primary Emergency Contact: Selvin Perez  Address: [address] [place] [address] Phone: [phone]  Relation: Spouse    Past Surgical History:  Past Surgical History:   Procedure Laterality Date    ABSCESS DRAINAGE  2006    X2 RECTAL ABSCESS    BRONCHOSCOPY N/A 2019    BRONCHOSCOPY BRUSHINGS performed by Italia Peterson MD at [place] [address] 10/7/2019    BRONCHOSCOPY DIAGNOSTIC OR CELL [address] ONLY performed by Italia Peterson MD at [address] [facility] N/A 2021    BRONCHOSCOPY performed by Italia Peterson MD at [facility] 22 COLONOSCOPY  2013    [facility]. INSERT PICC CATH, 5/> YRS  4/10/2021         LUMBAR FUSION  2019    [address] [facility]       Immunization History:   Immunization History   Administered Date(s) Administered    Influenza Vaccine, unspecified formulation 10/02/2013, 10/27/2014, 2016, 2017    Influenza Virus Vaccine 2015, 10/12/2016, 11/30/2018    Influenza, High Dose (Fluzone 65 yrs and older) 10/26/2017, 10/01/2019, 09/09/2020    Influenza, Joyceann Moment, IM, PF (6 mo and older Fluzone, Flulaval, Fluarix, and 3 yrs and older Afluria) 08/28/2015    Influenza, Triv, inactivated, subunit, adjuvanted, IM (Fluad 65 yrs and older) 11/28/2018    Pneumococcal Conjugate 13-valent ([id number]) 08/28/2015, 09/14/2020    Pneumococcal Conjugate Vaccine 10/12/2015    Pneumococcal Polysaccharide ([id number]) 10/26/2017    Zoster Live (Zostavax) 09/22/2016       Active Problems:  Patient Active Problem List   Diagnosis Code    [facility]chronic obstructive pulmonary disease) [facility] J44.9    Closed displaced fracture of second metatarsal bone of right foot with routine healing S92.321D    COPD exacerbation ([facility] J44.1    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    Pulmonary emphysema with fibrosis of lung ([facility] J43.9, J84.10    Thoracic ascending aortic aneurysm ([facility] I71.2    Pneumonia due to infectious organism J18.9    Cough with hemoptysis R04.2    Pneumonia, bacterial J15.9    S/P lumbar fusion Z98.1    Sepsis ([facility] A41.9    Infection due to parainfluenza virus 3 B34.8    Hypophosphatemia E83.39    Glucose intolerance E74.39    Hilar adenopathy R59.0    SADE (mycobacterium avium-intracellulare) ([facility]) A31.0    Acute on chronic respiratory failure with hypoxia ([facility]) J96.21    Pleural effusion on right J90    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 ([facility] A31.2    Chest pain R07.9    Rhinovirus infection B34.8    Acute hypoxemic respiratory failure ([facility]) J96.01    Hypoxia R09.02    Pneumonia J18.9    Acute heart failure with preserved ejection fraction ([facility]) I50.31       Isolation/Infection:   Isolation          No Isolation        Patient Infection Status     Infection Onset Added Last Indicated Last Indicated By Review Planned Expiration Resolved Resolved By    None active    Resolved    COVID-19 Rule Out 21 Respiratory Panel, Molecular, with COVID-19 (Restricted: peds pts or suitable admitted adults) (Ordered)   21 Rule-Out Test Resulted    COVID-19 Rule Out 21 Respiratory Panel, Molecular, with COVID-19 (Restricted: peds pts or suitable admitted adults) (Ordered)   21 Rule-Out Test Resulted    AFB 21 Culture with Smear, Acid Fast Bacillius   21 Lesley Vargas RN    M. avium-intracellulare complex     C-diff Rule Out 21 Clostridium difficile EIA (Ordered)   21 Lesley Vargas RN    canceled    COVID-19 21 Respiratory Panel, Molecular, with COVID-19 (Restricted: peds pts or suitable admitted adults)   01/15/21     COVID-19 Rule Out 21 Respiratory Panel, Molecular, with COVID-19 (Restricted: peds pts or suitable admitted adults) (Ordered)   21 Rule-Out Test Resulted    AFB 20 Culture with Smear, Acid Fast Bacillius   21 Lesley Vargas RN    COVID-19 Rule Out 10/15/20 10/15/20 10/15/20 COVID-19 (Ordered)   10/15/20 Rule-Out Test Resulted    COVID-19 Rule Out 10/12/20 10/12/20 10/12/20 COVID-19 Ambulatory (Ordered)   10/13/20 Rule-Out Test Resulted    AFB 19 Acid Fast Culture With Smear   10/03/19 Lesley Vargas RN    M avium complex 19          Nurse Assessment:  Last Vital Signs: /70   Pulse 99   Temp 97.3 °F (36.3 °C) (Axillary)   Resp 20   Ht 6' 1\" (1.854 m)   Wt 200 lb (90.7 kg)   SpO2 94%   BMI 26.39 kg/m²     Last documented pain score (0-10 scale): Pain Level: 3  Last Weight:   Wt Readings from Last 1 Encounters:   21 200 lb (90.7 kg)     Mental Status:  oriented, alert, coherent, logical and thought processes intact    IV Access:  - PICC - site  R Basilic, insertion date: 4/10/21    Nursing Mobility/ADLs:  Walking   Independent  Transfer Independent  Bathing  Independent  Dressing  Independent  Toileting  Independent  Feeding  Independent  Med Admin  Independent  Med Delivery   whole    Wound Care Documentation and Therapy:        Elimination:  Continence:   · Bowel: Yes  · Bladder: Yes  Urinary Catheter: None   Colostomy/Ileostomy/Ileal Conduit: No       Date of Last BM: 4/13    Intake/Output Summary (Last 24 hours) at 4/13/2021 1057  Last data filed at 4/13/2021 0830  Gross per 24 hour   Intake 2936 ml   Output 575 ml   Net 2361 ml     I/O last 3 completed shifts: In: 2696 [P.O.:540; I.V.:2156]  Out: 1065 [Urine:1065]    Safety Concerns:     None    Impairments/Disabilities:      None    Nutrition Therapy:  Current Nutrition Therapy:   - Oral Diet:  General    Routes of Feeding: Oral  Liquids: Thin Liquids  Daily Fluid Restriction: no  Last Modified Barium Swallow with Video (Video Swallowing Test): not done    Treatments at the Time of Hospital Discharge:   Respiratory Treatments: duonebs Q4; pulmicort 250mcg BID; brovana 15mcg BID; tobramycin inhalation 300mg BID  Oxygen Therapy:  is on oxygen at 15 L/min per nasal cannula.   Ventilator:    - BiPAP    , CPAP/EPAP: 6 cmH2O only when sleeping    Rehab Therapies: Physical Therapy and Occupational Therapy  Weight Bearing Status/Restrictions: No weight bearing restirctions  Other Medical Equipment (for information only, NOT a DME order):  {EQUIPMENT:117036362}  Other Treatments: ***    Patient's personal belongings (please select all that are sent with patient):  Anibal GORDON SIGNATURE:      CASE MANAGEMENT/SOCIAL WORK SECTION    Inpatient Status Date: ***    Readmission Risk Assessment Score:  Readmission Risk              Risk of Unplanned Readmission:        13           Discharging to Facility/ Agency   · Name:   · Address:  · Phone:  · Fax:    Dialysis Facility (if applicable)   · Name:  · Address:  · Dialysis Schedule:  · Phone:  · Fax:    / signature: {Esignature:[id number]}    PHYSICIAN SECTION    Prognosis: {Prognosis:2941504845}    Condition at Discharge: [address] Patient Condition:[id number]}    Rehab Potential (if transferring to Rehab): {Prognosis:3809614827}    Recommended Labs or Other Treatments After Discharge: ***    Physician Certification: I certify the above information and transfer of Anali Romance  is necessary for the continuing treatment of the diagnosis listed and that he requires {Admit to Appropriate Level of Care:59121} for {GREATER/LESS:861600083} 30 days.      Update Admission H&P: {CHP DME Changes in PZIPF:396126952}    PHYSICIAN SIGNATURE:  {Esignature:891937557}

## 2021-04-14 LAB
CULTURE, RESPIRATORY: NORMAL
SMEAR, RESPIRATORY: NORMAL

## 2021-04-14 NOTE — DISCHARGE SUMMARY
DISCHARGE SUMMARY  Patient ID:  Rommel Fang  [id number]  78 y.o.  1950    Admit date: 4/7/2021      Discharge date : 4/13/2021      Admission Diagnoses: Pneumonia [J18.9]    Discharge Diagnosis  Principal Problem:    Pneumonia  Active Problems:    COPD (chronic obstructive pulmonary disease) ([facility] [address]    Bullous emphysema ([facility] [address]    Pulmonary emphysema with fibrosis of lung ([facility] [address]    Thoracic ascending aortic aneurysm (HCC)    Glucose intolerance    SADE (mycobacterium avium-intracellulare) (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     History of Present Illness: 73-year-old male patient of Dr. Hadley Smith I am asked admit and follow. 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 Has a history of bullous emphysema, on oxygen at home as needed. Patient did have SARS-CoV-2  On 1/1/2021. In the ED SPO2 91% on room air. Had temperature of 103.1 the evening prior to admission. He was seen yesterday by ID service who noted that the patient had respiratory culture with Pseudomonas at his last hospitalization. Therefore his moxifloxacin was held, cefepime started as well as ciprofloxacin IV. Chest x-ray done in the ED with following results--     FINDINGS:   Large lung volumes may again reflect pulmonary emphysema. Normal cardiac silhouette size without definite vascular congestion. Chronic interstitial and alveolar density is again noted in the left lung   base.  New ground-glass opacity is noted in this region which may reflect   acute on chronic process.      Stable nonspecific oriented ×3. Denies any chest pain  nausea emesis. Tolerating diet. No abdominal pain. Dyspnea much improved chest discomfort nearly resolved. Wife is present through the exam. Highest temperature last 24 hours 100.4. Currently 98.9.  92% saturation 15 L high flow nasal cannula. Most recent blood pressure 97/54. Intake and output -1055 cc. Glucose 130. Troponins negative x3. CRP 18.5. Hemoglobin 15.9 WBC 11.5. Sed rate 21. Viral respiratory panel, SARS-CoV-2 all not detected. Legionella and strep antigens presumptive negative. Blood and urine cultures negative to date. Sputum Gram stain as follows--     Gram Stain Orderable 04/08/2021  5:30 PM  - [facility]   Group 5: >25 PMN's/LPF and <10 Epithelial cells/LPF   Moderate Polymorphonuclear leukocytes   Epithelial cells not seen   Few Gram positive cocci in pairs   Few Gram negative rods   Few Gram positive cocci in clusters       Chest x-ray from yesterday as follows--            FINDINGS:   Worsening airspace disease at the left base likely representing pneumonia. Stable pneumonia or fibrosis at the right base.  Pulmonary emphysema is also   present.        Impression:       Worsening pneumonia on the left.  Pneumonia and or fibrosis on the right. Emphysema         Social service note from yesterday appreciated. Patient wears 4 to 5 L while at home; no history of home health care and or skilled nursing facility. Cardiology consult from yesterday appreciated. Cardiology believes chest discomfort not angina rather due to coughing and soreness of the chest.  Pulmonary note from yesterday, end-stage COPD; right-sided pleural scarring chronic; new bilateral infiltrates likely representing pneumonia, appear worse with hydration. History of Mycobacterium avium complex. ID note from yesterday, patient thinks he is having pleuritic left chest pain.   Cefepime changed to meropenem; continue IV ciprofloxacin; start inhaled tobramycin, continue with ethambutol and rifampin, monitor labs. Nursing note from earlier this morning patient using bedside urinal with oxygen off, desaturated to the 70s. Nonrebreather applied since resumption of O2 only resulted in SPO2 of 80%. Patient very anxious because of dyspnea. Pulmonary was called; BiPAP placed, SPO2 95%. Patient early this a.m. requested off BiPAP; placed on 15 L high flow with saturation of 97%.     4/10/2021-patient laying quietly in bed, not having a good day. Still having shortness of breath episode; just had aerosol treatment. Great difficulties overnight with BiPAP. Has little to no appetite. He is having more chest discomfort mostly left-sided. Temperature still elevated 99.9.  94% saturation 15 L high flow nasal cannula. Intake and output -1210 cc. Potassium 3.4 glucose 113 phosphorus 1.2 protein 6.2 albumin 3.1 CRP still elevated but improving 16.3. Hemoglobin 15.4 WBC 10.2. Sed rate 24. PICC line permit has been entered. Pulmonary note from today, minimal reddish sputum. BiPAP only used a few hours last evening felt too strong. Tires quickly. Continue nebulizers wean oxygen as tolerated. ID note from today minimal productive cough still complaining of dyspnea. Continue meropenem and inhaled tobramycin. Continue oral ciprofloxacin and rifampin. Continue aerosol treatments. Patient may need bronchoscopy.     4/11/2021-patient sitting up in bed; still does not feel well. Continues on 15 L nasal cannula high flow; poor sleep. Appetite has diminished. Still has left-sided chest discomfort, mostly pleuritic and/or ribs from coughing. Afebrile last 24 hours. Pulse 110.  90% saturation 15 L high flow nasal cannula. Intake and output -1525 cc. Potassium 3.3 BUN 4 magnesium 1.7 phosphorus 1.7 CRP 17.2, higher. Albumin 3.0 WBC 8.2 platelets 10.3. Glucose 116.   CT scan of chest done this morning with following results--            FINDINGS:   There are advanced emphysematous changes in the lung parenchyma with multiple   bullous formation, some of fair size, with more spread areas of pan-lobular   emphysema pattern. Additional areas of pulmonary fibrosis and retraction with reticulation of   the inter lobular septi are seen throughout both lungs.  Several areas of   peripheral and central scarring are seen with spiculated appearance.  But   this appears to be stable over time, as also observed in the right lower lung   base, where underline pleural reaction is also present on chronic basis. As new finding since the study of October 15, 2020 is the development of a   area of confluent consolidation with somewhat triangular shape configuration,   measuring 6 x 3.6 x 4.4 cm, with the base against the pleural surface as seen   on axial images, located the in the anterior segment of the left upper lobe,   posterolateral to a previous area of a pulmonary parenchyma/pleural scar with   the areas of fibrosis and retraction that is a chronic finding in the left   upper lobe. This is new finding could be representation of acute the pneumonic process   but a mass lesion will be part of the differential diagnosis, this is new   finding since the study of October 2020.  Short-term interval follow a   clinical treatment trial for pneumonia could be a consideration prior   interventional procedures final interpretation could require correlation with   the histopathology.  Alternative will be evaluation with a PET-CT scan. As observed previously extensive mediastinum adenopathy seen at multiple   compartments.  This is an old finding. There is a small left-sided pleural effusion that was not present on the   previous study of October 2020. Upper abdominal structures are not fully covered.  The spleen appears to be   enlarged.  The liver has borderline size.  Adrenals are not enlarged. There is a hiatal hernia of small size.      Visualized bone structures demonstrate no significant findings.        Impression:       1.  Longstanding advanced emphysematous changes in the lung parenchyma with   extensive areas of pulmonary fibrosis, with spiculated scar bilateral which   appears to be overall stable over time back to the CT of October 2019. 2.  New findings since the recent CT chest of October 2020 is development of   a additional triangular consolidation in the left upper lobe which can   represent acute pneumonia versus malignancy.  See above comments and   recommendations. 3. Liliana Pena is also associated the development of a new mild right-sided   pleural effusion which was not observed on the study of October 2020.       Cardiology note from yesterday appreciated; chest pain noncardiac. PICC line was placed yesterday. Pulmonary note from today appreciated; patient may need bronchoscopy but may need to be intubated due to oxygen requirements at this time. Will discuss with Usama Adams. ID note from today; still dyspneic still minimally productive cough high flow oxygen continues. Continue meropenem and inhaled tobramycin; p.o. Cipro and rifampin; continue ethambutol. Bronchoscopy may be needed.     4/12/2021-patient laying quietly in bed no chest pain dyspnea continues. Appetite remains poor. Scheduled for bronchoscopy today. Current temperature 99.4. Pulse 102 respirations 22.  91% saturation 15 L nasal cannula high flow. Intake and output -2320 cc. BUN 4 creatinine 0.5 glucose 107 potassium 3.7 phosphorus 2.4. Protein 6.2 albumin 3.2. CRP slowly improving 11.6. Hemoglobin 15.3 WBC 8.6. Sed rate 28. Respiratory culture, normal tonja present. Patient was placed on AVAPS for the night. BiPAP this morning. Cardiology note from today still feels quite weak somewhat productive cough slightly improved. Occasional chest pain nonexertional; rather with breathing and coughing. ID note from today, patient tolerating antibiotics still dyspneic. Continue meropenem and inhaled tobramycin; continue p.o. ciprofloxacin and rifampin and ethambutol. Bronchoscopy today. Patient did have bronchoscopy performed; I spoke with pulmonologist regarding same. Results of bronchoscopy as follows--     Findings: Massive mucous plugging at the left mainstem.  No endobronchial lesions.     Detailed Description of Procedure:   The patient was informed of the procedure, consent was obtained.  A fiberoptic bronchoscope was passed through the oropharynx.  The vocal cords move promptly to the midline.  The trachea was then intubated.  Upon intubation, thick brown mucus was seen in the trachea.  This was aggressively aspirated.  The right lung was inspected and appeared normal.  The left lung was then selectively intubated.  At the left mainstem bronchus a huge amount of mucus plugging was noted.  This occluded the scope.  The scope was removed with roughly 15 cm of thick mucous plug hanging from it.     The bronchoscope was then reintroduced and the airways all appeared clear.  No evidence of endobronchial lesion was seen on the left.  The lung was then selectively intubated and a wash was conducted. Gould Schanz was excellent.     The patient tolerated procedure well and was sent to recovery in stable condition.  Specimens were sent for culture and sensitivity, mycobacterial studies and fungal studies.     4/13/2021-sitting up in bed, no distress. Does not feel much better than yesterday; appetite poor still short of breath. Still requires 15 L nasal cannula oxygen. No chest pain. No diarrhea. Afebrile last 24 hours. Still requiring high flow nasal cannula oxygen 15 L/min; SPO2 90. Intake and output +91 cc. CO2 30 glucose 104. Protein 6.0 albumin 3.1. CRP 12.3. WBC 11.7 hemoglobin 14.9 platelets 214. Sed rate improving 22. Gram stain from bronchoscopy--greater than 25 polys; rare epithelial cells no organisms seen.    note from today, patient has been accepted at  [facility]. Cardiology note from today patient still feels weak continues to experience shortness of breath productive cough but is improved. Can be transferred to [facility] within the next 24 hours.     Hospital orders:    RE  CHANGES AND FINDINGS   Medications reviewed with patient  GI prophylaxis  DVT prophylaxis  Consultants notes reviewed  Aerosol treatments  Ciprofloxacin 750 mg twice daily by mouth  Famotidine 20 mg daily  Ethambutol 800 mg Monday Wednesday Friday  Rifampin 600 mg every 48 hours  Meropenem 1 g IV every 8 hours  Tobramycin 300 mg inhaled every 12 hours  Morphine 2 mg IV every 4 hours as needed for severe pain  Percocet 5 mg every 4 hours as needed for pain  Potassium phosphate 20 mmol IV today  Inhaled tobramycin 300 mg every 12 hours  Meropenem 1 g IV every 8 hours  Rifampin 600 mg every 48 hours  Ciprofloxacin 750 mg by mouth twice daily  Ethambutol 800 mg daily at this time  CT chest  Percocet 5 mg every 4 hours as needed for pain  May need bronchoscopy  Continue aerosol treatments  Cipro 750 mg twice daily by mouth  Ethambutol 800 mg daily  Meropenem 1 g IV every 8 hours  Inhaled tobramycin 300 mg every 12 hours  Replace potassium phosphate 30 mmol IV today  Rifampin 600 mg every 48 hours  Monitor labs  Percocet 5 every 4 hours as needed for pain  Likely bronchoscopy  Percocet every 4 hours as needed for pain  Continue aerosol treatments  Cipro 750 mg twice daily by mouth  Ethambutol 800 mg daily  Rifampin 600 mg every 48 hours  Meropenem 1 g IV every 8 hours  Inhaled tobramycin 300 mg every 12 hours  Replace potassium phosphate 20 mmol IV today    Instructions at discharge:  Percocet 5 mg every 4 hours as needed, pain  Patient agrees to transfer to [facility], select; if okay with others  Meropenem 1 g IV every 8 hours  Ethambutol 800 mg daily  Rifampin 600 mg every 48 hours  Continue aerosol treatments  Cipro 750 mg twice daily  Monitor labs  Percocet 5 mg every 4 hours as needed for Jonelle Ruiz MD at [address] N/A 4/12/2021    BRONCHOSCOPY performed by Jonelle Ruiz MD at [address] COLONOSCOPY  11/18/2013    HC INSERT PICC CATH, 5/> YRS  4/10/2021         LUMBAR FUSION  03/2019    [facility]       Labs:   CBC:   Lab Results   Component Value Date    WBC 9.6 04/14/2021    RBC 5.21 04/14/2021    HGB 15.6 04/14/2021    HCT 46.8 04/14/2021    MCV 89.8 04/14/2021    MCH 29.9 04/14/2021    MCHC 33.3 04/14/2021    RDW 13.9 04/14/2021     04/14/2021    MPV 9.7 04/14/2021     CBC with Differential:    Lab Results   Component Value Date    WBC 9.6 04/14/2021    RBC 5.21 04/14/2021    HGB 15.6 04/14/2021    HCT 46.8 04/14/2021     04/14/2021    MCV 89.8 04/14/2021    MCH 29.9 04/14/2021    MCHC 33.3 04/14/2021    RDW 13.9 04/14/2021    NRBC 0.0 04/07/2021    LYMPHOPCT 10.4 04/14/2021    MONOPCT 7.8 04/14/2021    MYELOPCT 0.9 04/07/2021    BASOPCT 0.4 04/14/2021    MONOSABS 0.75 04/14/2021    LYMPHSABS 1.00 04/14/2021    EOSABS 0.41 04/14/2021    BASOSABS 0.04 04/14/2021     Hemoglobin/Hematocrit:    Lab Results   Component Value Date    HGB 15.6 04/14/2021    HCT 46.8 04/14/2021     CMP:    Lab Results   Component Value Date     04/14/2021    K 4.0 04/14/2021    K 3.7 04/07/2021    CL 98 04/14/2021    CO2 30 04/14/2021    BUN 7 04/14/2021    CREATININE 0.5 04/14/2021    GFRAA >60 04/14/2021    LABGLOM >60 04/14/2021    GLUCOSE 105 04/14/2021    PROT 6.0 04/14/2021    LABALBU 2.9 04/14/2021    CALCIUM 9.0 04/14/2021    BILITOT 0.4 04/14/2021    ALKPHOS 59 04/14/2021    AST 14 04/14/2021    ALT 10 04/14/2021     BMP:    Lab Results   Component Value Date     04/14/2021    K 4.0 04/14/2021    K 3.7 04/07/2021    CL 98 04/14/2021    CO2 30 04/14/2021    BUN 7 04/14/2021    LABALBU 2.9 04/14/2021    CREATININE 0.5 04/14/2021    CALCIUM 9.0 04/14/2021    GFRAA >60 04/14/2021    LABGLOM >60 04/14/2021    GLUCOSE 105 04/14/2021     Hepatic Function Panel:    Lab Results   Component Value Date    ALKPHOS 59 04/14/2021    ALT 10 04/14/2021    AST 14 04/14/2021    PROT 6.0 04/14/2021    BILITOT 0.4 04/14/2021    BILIDIR <0.2 04/13/2021    IBILI see below 04/13/2021    LABALBU 2.9 04/14/2021     Magnesium:    Lab Results   Component Value Date    MG 1.7 04/13/2021     Phosphorus:    Lab Results   Component Value Date    PHOS 2.3 04/13/2021     LDH:    Lab Results   Component Value Date     01/02/2021     Uric Acid:    Lab Results   Component Value Date    LABURIC 3.4 04/08/2021     PT/INR:    Lab Results   Component Value Date    PROTIME 12.5 05/30/2019    INR 1.1 05/30/2019     Warfarin PT/INR:  No components found for: PTPATWAR, PTINRWAR  PTT:    Lab Results   Component Value Date    APTT 31.4 05/30/2019   [APTT}  Troponin:    Lab Results   Component Value Date    TROPONINI <0.01 04/08/2021     Last 3 Troponin:    Lab Results   Component Value Date    TROPONINI <0.01 04/08/2021    TROPONINI <0.01 04/08/2021    TROPONINI <0.01 01/02/2021     U/A:    Lab Results   Component Value Date    COLORU Yellow 04/07/2021    PROTEINU TRACE 04/07/2021    PHUR 5.5 04/07/2021    LABCAST FEW 10/02/2019    WBCUA NONE 04/07/2021    RBCUA NONE 04/07/2021    MUCUS Present 04/07/2021    BACTERIA FEW 04/07/2021    CLARITYU Clear 04/07/2021    SPECGRAV >=1.030 04/07/2021    LEUKOCYTESUR Negative 04/07/2021    UROBILINOGEN 0.2 04/07/2021    BILIRUBINUR MODERATE 04/07/2021    BLOODU TRACE-LYSED 04/07/2021    GLUCOSEU Negative 04/07/2021     ABG:    Lab Results   Component Value Date    PH 7.440 05/28/2019    PCO2 32.1 05/28/2019    PO2 59.1 05/28/2019    HCO3 21.3 05/28/2019    BE -1.8 05/28/2019    O2SAT 91.6 05/28/2019     HgBA1c:    Lab Results   Component Value Date    LABA1C 5.1 04/08/2021     FLP:    Lab Results   Component Value Date    TRIG 49 04/08/2021    HDL 42 04/08/2021    LDLCALC 57 04/08/2021    LABVLDL 10 04/08/2021     TSH:    Lab Results   Component Value Date    TSH 1.840 04/08/2021     VITAMIN B12: No components found for: B12  FOLATE:    Lab Results   Component Value Date    FOLATE 6.8 04/08/2021     IRON:  No results found for: IRON  Iron Saturation:  No components found for: PERCENTFE  TIBC:  No results found for: TIBC  FERRITIN:    Lab Results   Component Value Date    FERRITIN 79 01/02/2021          CBC:  Recent Labs     04/12/21 0425 04/13/21 0521 04/14/21 0444   WBC 8.6 11.7* 9.6   RBC 5.05 4.89 5.21   HGB 15.3 14.9 15.6   HCT 45.0 43.9 46.8    259 274   MCV 89.1 89.8 89.8   MCH 30.3 30.5 29.9   MCHC 34.0 33.9 33.3   RDW 14.2 13.9 13.9   LYMPHOPCT 8.8* 9.3* 10.4*   MONOPCT 8.7 7.9 7.8   BASOPCT 0.3 0.3 0.4   MONOSABS 0.75 0.93 0.75   LYMPHSABS 0.76* 1.09* 1.00*   EOSABS 0.35 0.25 0.41   BASOSABS 0.03 0.03 0.04          H & H :  Recent Labs     04/12/21 0425 04/13/21 0521 04/14/21 0444   HGB 15.3 14.9 15.6       TSH:No results for input(s): TSH in the last 72 hours. GLUCOSE:No results for input(s): POCGLU in the last 72 hours. CMP:  Recent Labs     04/12/21 0425 04/13/21 0521 04/14/21 0444    134 135   K 3.7 3.8 4.0   CL 99 95* 98   CO2 29 30* 30*   BUN 4* 4* 7*   CREATININE 0.5* 0.5* 0.5*   GFRAA >60 >60 >60   LABGLOM >60 >60 >60   GLUCOSE 107* 104* 105*   PROT 6.2* 6.0* 6.0*   LABALBU 3.2* 3.1* 2.9*   CALCIUM 9.5 9.3 9.0   BILITOT 0.5 0.5 0.4   ALKPHOS 63 66 59   AST 17 18 14   ALT 12 10 10         BNP:No results found for: BNP    PROTIME/INR:No results for input(s): PROTIME, INR in the last 72 hours. CRP:   Recent Labs     04/12/21  0425 04/13/21  0521   CRP 11.6* 12.3*       ESR:  Recent Labs     04/12/21 0425 04/13/21 0521   SEDRATE 28* 22*       LIPASE , AMYLASE:  Lab Results   Component Value Date    LIPASE 16 05/30/2019      No results found for: AMYLASE    ABGs: No results found for: PHART, PO2ART, FCI2OSR    CARDIAC: No results for input(s): CKTOTAL, CKMB, CKMBINDEX, TROPONINI in the last 72 hours.     Lipid 4/7/2021 10:51 am COMPARISON: 02/02/2021 HISTORY: ORDERING SYSTEM PROVIDED HISTORY: Cough SOB TECHNOLOGIST PROVIDED HISTORY: Latesha Lagos on 4/7/2021 10:52 AM 62 in AP erect portable Fatigue Shortness of Breath (thinks he has pneumonia again) Reason for exam:->Cough SOB FINDINGS: Large lung volumes may again reflect pulmonary emphysema. Normal cardiac silhouette size without definite vascular congestion. Chronic interstitial and alveolar density is again noted in the left lung base. New ground-glass opacity is noted in this region which may reflect acute on chronic process. Stable nonspecific elevation of right diaphragm may be associated with basilar scarring. No pneumothorax or definite pleural effusion. No acute displaced fracture. New ground-glass opacity in the left lung base, in the region of previous chronic interstitial and alveolar density, may reflect superimposed new atelectasis and/or pneumonia.        Discharge Exam:  See progress note from today    Discharge Medication List as of 4/13/2021  3:25 PM      START taking these medications    Details   tobramycin (NEBCIN) 80 MG/2ML SOLN Inhale 7.5 mLs into the lungs every 12 hours, Disp-40 mL, R-0DC to SNF      ibuprofen (ADVIL;MOTRIN) 200 MG tablet Take 1 tablet by mouth 3 times daily (with meals), Disp-120 tablet, R-3DC to SNF      Arformoterol Tartrate (BROVANA) 15 MCG/2ML NEBU Take 2 mLs by nebulization 2 times daily, Disp-120 mL, R-3DC to SNF      budesonide (PULMICORT) 0.25 MG/2ML nebulizer suspension Take 2 mLs by nebulization 2 times daily, Disp-60 ampule, R-3DC to SNF      ipratropium-albuterol (DUONEB) 0.5-2.5 (3) MG/3ML SOLN nebulizer solution Inhale 3 mLs into the lungs every 4 hours (while awake), Disp-360 mLDC to SNF      phenylephrine (CLIVE-SYNEPHRINE) 1 % nasal spray 1 spray by Nasal route every 6 hours as needed for Congestion, R-1DC to SNF      sodium chloride (OCEAN, BABY AYR) 0.65 % nasal spray 1 spray by Nasal route every 4 hours as needed for Congestion, R-0DC to SNF      ciprofloxacin (CIPRO) 750 MG tablet Take 1 tablet by mouth every 12 hours for 10 days, Disp-20 tablet, R-0DC to SNF      Respiratory Therapy Supplies (FULL KIT NEBULIZER SET) MISC Disp-1 each, R-0, PrintUse as directed with nebulized medication. CONTINUE these medications which have NOT CHANGED    Details   rifAMPin (RIFADIN) 300 MG capsule Take 2 capsules by mouth three times a week Every M-W-F, Disp-72 capsule, R-4Normal      ethambutol (MYAMBUTOL) 400 MG tablet Take 2 tablets by mouth three times a week Every M-W-F, Disp-72 tablet, R-4Normal      melatonin 3 MG TABS tablet Take 3 tablets by mouth nightly, Disp-50 tablet, R-0OTC      famotidine (PEPCID) 20 MG tablet Take 1 tablet by mouth daily, Disp-30 tablet, R-0Normal      oxyCODONE-acetaminophen (PERCOCET) 7.5-325 MG per tablet Take 1 tablet by mouth 2 times daily as needed for Pain.  Historical Med         STOP taking these medications       etodolac (LODINE) 500 MG tablet Comments:   Reason for Stopping:         COVID-19 mRNA Vacc, Moderna, 100 MCG/0.5ML SUSP injection Comments:   Reason for Stopping:         moxifloxacin (AVELOX) 400 MG tablet Comments:   Reason for Stopping:         zinc sulfate (ZINCATE) 220 (50 Zn) MG capsule Comments:   Reason for Stopping:         Fluticasone-Umeclidin-Vilant (Louellen Crystal) 100-62.5-25 MCG/INH AEPB Comments:   Reason for Stopping:         albuterol sulfate  (90 Base) MCG/ACT inhaler Comments:   Reason for Stopping:         OXYGEN Comments:   Reason for Stopping:         etodolac (LODINE) 500 MG tablet Comments:   Reason for Stopping:               Time Spent on discharge is more than 30 minutes --      TIME  INCLUDES TIME THAT WAS  SPENT WITH DISCHARGE PAPERS, MEDICATION REVIEW, MEDICATION RECONCILIATION,   PRESCRIPTIONS, CHART REVIEW, PATIENT EXAM , FINAL PROGRESS NOTE, DISCUSSION OF FINDINGS WITH PATIENT AND AVAILABLE FAMILY , AND DICTATION  WHERE NEEDED ; Home Health Care [facility]) FORMS COMPLETED ; N-17  COMPLETION ;  H&P UPDATED ; DURABLE EQUIPMENT FORMS.      Active Hospital Problems    Diagnosis    Acute heart failure with preserved ejection fraction (HCC) [I50.31]    Pneumonia [J18.9]    Acute on chronic respiratory failure with hypoxia (HCC) [J96.21]    SADE (mycobacterium avium-intracellulare) (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

## 2021-05-05 LAB
AFB CULTURE (MYCOBACTERIA): ABNORMAL
AFB SMEAR: ABNORMAL
ORGANISM: ABNORMAL

## 2021-05-10 ENCOUNTER — CARE COORDINATION (OUTPATIENT)
Dept: CASE MANAGEMENT | Age: 71
End: 2021-05-10

## 2021-05-10 DIAGNOSIS — J96.01 ACUTE HYPOXEMIC RESPIRATORY FAILURE (HCC): Primary | ICD-10-CM

## 2021-05-10 PROCEDURE — 1111F DSCHRG MED/CURRENT MED MERGE: CPT | Performed by: INTERNAL MEDICINE

## 2021-05-10 NOTE — CARE COORDINATION
Delaney 45 Transitions Initial Follow Up Call    Call within 2 business days of discharge: No    Patient: Irineo Masterson Patient : 1950   MRN: [id number]  Reason for Admission:   Discharge Date: 21 RARS: Readmission Risk Score: 14      Last Discharge [facility]       Complaint Diagnosis Description Type Department Provider    21   Admission (Discharged) [address], DO           Spoke with: Irineo Masterson, patient      Was this an external facility discharge? Yes, 2021 Discharge Facility: [facility]    Challenges to be reviewed by the provider   Additional needs identified to be addressed with provider No  none             Method of communication with provider : none    Discussed COVID-19 related testing which was not done at this time. Test results were not done. Patient informed of results, if available? NA    Advance Care Planning:   Does patient have an Advance Directive: Has ACP docs. Was this a readmission? No  Patient stated reason for admission: Shortness of Breath and Productive Cough  Patients top risk factors for readmission: medical condition-CHF, COPD and medication management    Care Transition Nurse (CTN) contacted the patient by telephone to perform post hospital discharge assessment. Verified name and  with patient as identifiers. Provided introduction to self, and explanation of the CTN role. CTN reviewed discharge instructions, medical action plan and red flags with patient who verbalized understanding. Patient given an opportunity to ask questions and does not have any further questions or concerns at this time. Were discharge instructions available to patient? Yes. Reviewed appropriate site of care based on symptoms and resources available to patient including: PCP. The patient agrees to contact the PCP office for questions related to their healthcare.      Medication reconciliation was performed with patient, who verbalizes

## 2021-05-13 ENCOUNTER — OFFICE VISIT (OUTPATIENT)
Dept: PRIMARY CARE CLINIC | Age: 71
End: 2021-05-13
Payer: MEDICARE

## 2021-05-13 VITALS
HEIGHT: 73 IN | BODY MASS INDEX: 24.65 KG/M2 | HEART RATE: 83 BPM | OXYGEN SATURATION: 79 % | TEMPERATURE: 97.8 F | WEIGHT: 186 LBS | SYSTOLIC BLOOD PRESSURE: 110 MMHG | RESPIRATION RATE: 18 BRPM | DIASTOLIC BLOOD PRESSURE: 70 MMHG

## 2021-05-13 DIAGNOSIS — J44.9 CHRONIC OBSTRUCTIVE PULMONARY DISEASE, UNSPECIFIED COPD TYPE (HCC): Primary | ICD-10-CM

## 2021-05-13 DIAGNOSIS — A31.0 MAI (MYCOBACTERIUM AVIUM-INTRACELLULARE) (HCC): ICD-10-CM

## 2021-05-13 DIAGNOSIS — J43.9 PULMONARY EMPHYSEMA WITH FIBROSIS OF LUNG (HCC): ICD-10-CM

## 2021-05-13 DIAGNOSIS — J96.11 CHRONIC RESPIRATORY FAILURE WITH HYPOXIA (HCC): ICD-10-CM

## 2021-05-13 DIAGNOSIS — M15.9 PRIMARY OSTEOARTHRITIS INVOLVING MULTIPLE JOINTS: Primary | ICD-10-CM

## 2021-05-13 DIAGNOSIS — J84.10 PULMONARY EMPHYSEMA WITH FIBROSIS OF LUNG (HCC): ICD-10-CM

## 2021-05-13 PROBLEM — J15.9 PNEUMONIA, BACTERIAL: Status: RESOLVED | Noted: 2019-05-30 | Resolved: 2021-05-13

## 2021-05-13 PROBLEM — R07.9 CHEST PAIN: Status: RESOLVED | Noted: 2020-10-15 | Resolved: 2021-05-13

## 2021-05-13 PROBLEM — R04.2 COUGH WITH HEMOPTYSIS: Status: RESOLVED | Noted: 2019-04-23 | Resolved: 2021-05-13

## 2021-05-13 PROBLEM — J96.01 ACUTE HYPOXEMIC RESPIRATORY FAILURE (HCC): Status: RESOLVED | Noted: 2021-01-01 | Resolved: 2021-05-13

## 2021-05-13 PROBLEM — B34.8 RHINOVIRUS INFECTION: Status: RESOLVED | Noted: 2020-10-16 | Resolved: 2021-05-13

## 2021-05-13 PROBLEM — J96.21 ACUTE ON CHRONIC RESPIRATORY FAILURE WITH HYPOXIA (HCC): Status: RESOLVED | Noted: 2019-10-02 | Resolved: 2021-05-13

## 2021-05-13 PROBLEM — A41.9 SEPSIS (HCC): Status: RESOLVED | Noted: 2019-06-09 | Resolved: 2021-05-13

## 2021-05-13 PROBLEM — I50.31 ACUTE HEART FAILURE WITH PRESERVED EJECTION FRACTION (HCC): Status: RESOLVED | Noted: 2021-04-08 | Resolved: 2021-05-13

## 2021-05-13 PROBLEM — J44.1 COPD EXACERBATION (HCC): Status: RESOLVED | Noted: 2018-02-23 | Resolved: 2021-05-13

## 2021-05-13 PROBLEM — J18.9 PNEUMONIA: Status: RESOLVED | Noted: 2021-04-07 | Resolved: 2021-05-13

## 2021-05-13 PROBLEM — S92.321D CLOSED DISPLACED FRACTURE OF SECOND METATARSAL BONE OF RIGHT FOOT WITH ROUTINE HEALING: Status: RESOLVED | Noted: 2017-12-22 | Resolved: 2021-05-13

## 2021-05-13 PROBLEM — J18.9 PNEUMONIA DUE TO INFECTIOUS ORGANISM: Status: RESOLVED | Noted: 2019-04-22 | Resolved: 2021-05-13

## 2021-05-13 PROBLEM — B34.8 INFECTION DUE TO PARAINFLUENZA VIRUS 3: Status: RESOLVED | Noted: 2019-06-10 | Resolved: 2021-05-13

## 2021-05-13 PROCEDURE — 1111F DSCHRG MED/CURRENT MED MERGE: CPT | Performed by: INTERNAL MEDICINE

## 2021-05-13 PROCEDURE — 4040F PNEUMOC VAC/ADMIN/RCVD: CPT | Performed by: INTERNAL MEDICINE

## 2021-05-13 PROCEDURE — 1123F ACP DISCUSS/DSCN MKR DOCD: CPT | Performed by: INTERNAL MEDICINE

## 2021-05-13 PROCEDURE — G8926 SPIRO NO PERF OR DOC: HCPCS | Performed by: INTERNAL MEDICINE

## 2021-05-13 PROCEDURE — G8420 CALC BMI NORM PARAMETERS: HCPCS | Performed by: INTERNAL MEDICINE

## 2021-05-13 PROCEDURE — 3023F SPIROM DOC REV: CPT | Performed by: INTERNAL MEDICINE

## 2021-05-13 PROCEDURE — 3017F COLORECTAL CA SCREEN DOC REV: CPT | Performed by: INTERNAL MEDICINE

## 2021-05-13 PROCEDURE — 99214 OFFICE O/P EST MOD 30 MIN: CPT | Performed by: INTERNAL MEDICINE

## 2021-05-13 PROCEDURE — G8427 DOCREV CUR MEDS BY ELIG CLIN: HCPCS | Performed by: INTERNAL MEDICINE

## 2021-05-13 PROCEDURE — 1036F TOBACCO NON-USER: CPT | Performed by: INTERNAL MEDICINE

## 2021-05-13 RX ORDER — ETODOLAC 500 MG/1
500 TABLET, FILM COATED ORAL 2 TIMES DAILY
Qty: 180 TABLET | Refills: 3 | Status: SHIPPED
Start: 2021-05-13 | End: 2021-08-19

## 2021-05-13 RX ORDER — BENZONATATE 100 MG/1
100 CAPSULE ORAL 3 TIMES DAILY PRN
COMMUNITY
End: 2021-05-26 | Stop reason: ALTCHOICE

## 2021-05-13 RX ORDER — MOXIFLOXACIN HYDROCHLORIDE 400 MG/1
400 TABLET ORAL
Status: ON HOLD | COMMUNITY
End: 2022-02-01

## 2021-05-13 RX ORDER — ZINC SULFATE 50(220)MG
220 CAPSULE ORAL DAILY
COMMUNITY
End: 2021-07-27

## 2021-05-13 RX ORDER — ETODOLAC 500 MG/1
500 TABLET, FILM COATED ORAL 2 TIMES DAILY
COMMUNITY
End: 2021-05-13 | Stop reason: SDUPTHER

## 2021-05-13 ASSESSMENT — PATIENT HEALTH QUESTIONNAIRE - PHQ9: 1. LITTLE INTEREST OR PLEASURE IN DOING THINGS: 0

## 2021-05-16 LAB
FUNGUS (MYCOLOGY) CULTURE: ABNORMAL
FUNGUS STAIN: ABNORMAL
ORGANISM: ABNORMAL

## 2021-05-17 ENCOUNTER — CARE COORDINATION (OUTPATIENT)
Dept: CASE MANAGEMENT | Age: 71
End: 2021-05-17

## 2021-05-17 NOTE — CARE COORDINATION
Del[facility] 45 Transitions Follow Up Call    2021    Patient: Patriica Downs  Patient : 1950   MRN: [id number]  Reason for Admission:   Discharge Date: 21 RARS: Readmission Risk Score: 14    Attempted to contact patient for BPCI-A follow up. Unable to reach patient. Left message with contact information and request for call back.       Follow Up  Future Appointments   Date Time Provider Tan Miranda   2021  1:30 PM MIGUE Firend - CNP [facility]   2021  9:45 AM DO Rupert [place] ENT [facility]   6/10/2021  9:30 AM Andrzej Bingham MD [facility] [place] [facility]   2021 10:30 AM MD WARD [facility]   2021  1:30 PM MD WARD [facility]       Claudette Zuleta RN

## 2021-05-18 LAB
AFB CULTURE (MYCOBACTERIA): NORMAL
AFB SMEAR: NORMAL

## 2021-05-25 ENCOUNTER — CARE COORDINATION (OUTPATIENT)
Dept: CASE MANAGEMENT | Age: 71
End: 2021-05-25

## 2021-05-25 NOTE — CARE COORDINATION
Delaney 45 Transitions Follow Up Call    2021    Patient: Chalino Gilbert  Patient : 1950   MRN: [id number]  Reason for Admission:   Discharge Date: 21 RARS: Readmission Risk Score: 14    Attempted to contact patient for BPCI-A follow up. Unable to reach patient. Left message with contact information and request for call back.       Follow Up  Future Appointments   Date Time Provider Tan Miranda   2021  9:45 AM DO Emmy Chavez [address] ENT [facility]   6/10/2021  9:30 AM Keron Blackburn MD [facility]   2021 10:30 AM MD WARD [facility]   2021  1:15 PM MIGUE Hummel - CNP [facility]   2021  1:30 PM MD WARD [facility]       Cedric Post RN

## 2021-05-26 ENCOUNTER — PROCEDURE VISIT (OUTPATIENT)
Dept: AUDIOLOGY | Age: 71
End: 2021-05-26
Payer: MEDICARE

## 2021-05-26 ENCOUNTER — OFFICE VISIT (OUTPATIENT)
Dept: ENT CLINIC | Age: 71
End: 2021-05-26
Payer: MEDICARE

## 2021-05-26 VITALS
WEIGHT: 170 LBS | SYSTOLIC BLOOD PRESSURE: 114 MMHG | HEIGHT: 73 IN | DIASTOLIC BLOOD PRESSURE: 66 MMHG | BODY MASS INDEX: 22.53 KG/M2 | HEART RATE: 64 BPM

## 2021-05-26 DIAGNOSIS — H61.23 BILATERAL IMPACTED CERUMEN: ICD-10-CM

## 2021-05-26 DIAGNOSIS — H90.A32 MIXED CONDUCTIVE AND SENSORINEURAL HEARING LOSS OF LEFT EAR WITH RESTRICTED HEARING OF RIGHT EAR: Primary | ICD-10-CM

## 2021-05-26 DIAGNOSIS — H90.3 SENSORINEURAL HEARING LOSS (SNHL) OF BOTH EARS: Primary | ICD-10-CM

## 2021-05-26 PROCEDURE — 69210 REMOVE IMPACTED EAR WAX UNI: CPT | Performed by: OTOLARYNGOLOGY

## 2021-05-26 PROCEDURE — 1123F ACP DISCUSS/DSCN MKR DOCD: CPT | Performed by: OTOLARYNGOLOGY

## 2021-05-26 PROCEDURE — G8420 CALC BMI NORM PARAMETERS: HCPCS | Performed by: OTOLARYNGOLOGY

## 2021-05-26 PROCEDURE — 1036F TOBACCO NON-USER: CPT | Performed by: OTOLARYNGOLOGY

## 2021-05-26 PROCEDURE — 3017F COLORECTAL CA SCREEN DOC REV: CPT | Performed by: OTOLARYNGOLOGY

## 2021-05-26 PROCEDURE — 92567 TYMPANOMETRY: CPT | Performed by: AUDIOLOGIST

## 2021-05-26 PROCEDURE — 92557 COMPREHENSIVE HEARING TEST: CPT | Performed by: AUDIOLOGIST

## 2021-05-26 PROCEDURE — 99204 OFFICE O/P NEW MOD 45 MIN: CPT | Performed by: OTOLARYNGOLOGY

## 2021-05-26 PROCEDURE — 4040F PNEUMOC VAC/ADMIN/RCVD: CPT | Performed by: OTOLARYNGOLOGY

## 2021-05-26 PROCEDURE — G8427 DOCREV CUR MEDS BY ELIG CLIN: HCPCS | Performed by: OTOLARYNGOLOGY

## 2021-05-26 ASSESSMENT — ENCOUNTER SYMPTOMS
APNEA: 0
RESPIRATORY NEGATIVE: 1
DIARRHEA: 0
EYES NEGATIVE: 1
EYE DISCHARGE: 0
CHEST TIGHTNESS: 0
EYE PAIN: 0
SHORTNESS OF BREATH: 0
ABDOMINAL PAIN: 0
COLOR CHANGE: 0
VOMITING: 0
GASTROINTESTINAL NEGATIVE: 1

## 2021-05-26 NOTE — PROGRESS NOTES
Subjective:      Patient ID:  Wenceslao Young is a 78 y.o. male. HPI:    Cerumen Impaction  Patient presents with diminished hearing left ear for the past 1 year. There is a prior history of cerumen impaction. The patient was not using ear drops to loosen wax immediately prior to this visit. Hearing Loss  Patient presents today with complaints of hearing loss. Concern regarding hearing has been present for 1 year. He has not failed a prior hearing test.  The patient reports turning up the T.V., saying \"huh\" or \"what\".            Past Medical History:   Diagnosis Date    Acute and chronic respiratory failure with hypoxia ([address] 10/12/2017    Acute heart failure with preserved ejection fraction ([address] 4/8/2021    Acute respiratory disease due to severe acute respiratory syndrome coronavirus 2 (SARS-CoV-2) 01/01/2021    Acute respiratory failure with hypoxia (HCC) 11/12/2018    Bullous emphysema ([address] 11/12/2018    Chronic back pain     Degeneration of umbar intervertebral disc    Colon cancer screening     COPD (chronic obstructive pulmonary disease) ([address]     Cough with hemoptysis 04/23/2019    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 ([address] 08/15/2019    First seen by ID; treatment started 9/4/2019    Emphysema lung ([address]     Glucose intolerance 06/10/2019    Hilar adenopathy 06/10/2019    Hypophosphatemia 06/10/2019    Infection due to parainfluenza virus 3 06/10/2019    Left upper lobe pneumonia 10/12/2017    Pleural effusion on right 10/03/2019    Pulmonary emphysema with fibrosis of lung ([address] 11/15/2018    Pulmonary Mycobacterium avium complex (MAC) infection ([address] 07/12/2019    BAL results finally completed this date    Rhinovirus infection 10/16/2020    Right lower lobe pneumonia 11/15/2018    Recurrent 4/23/19    S/P lumbar fusion 06/01/2019    Thoracic ascending aortic aneurysm ([address] 11/15/2018    Proximal ascending aorta; 3.8 cm; 11/15/18     Past with Friends and Family:     Attends [employer] Services:     Active Member of Clubs or Organizations:     Attends Club or Organization Meetings:     Marital Status:    Intimate Partner Violence:     Fear of Current or Ex-Partner:     Emotionally Abused:     Physically Abused:     Sexually Abused:      No Known Allergies      Review of Systems   Constitutional: Negative. Negative for appetite change. HENT: Positive for congestion and hearing loss. Eyes: Negative. Negative for pain, discharge and visual disturbance. Respiratory: Negative. Negative for apnea, chest tightness and shortness of breath. Cardiovascular: Negative. Negative for chest pain, palpitations and leg swelling. Gastrointestinal: Negative. Negative for abdominal pain, diarrhea and vomiting. Endocrine: Negative for cold intolerance, heat intolerance and polydipsia. Genitourinary: Negative. Negative for dysuria, flank pain and hematuria. Musculoskeletal: Negative. Negative for arthralgias, gait problem and neck pain. Skin: Negative. Negative for color change, pallor and rash. Allergic/Immunologic: Negative for environmental allergies, food allergies and immunocompromised state. Neurological: Negative. Negative for dizziness, numbness and headaches. Hematological: Negative for adenopathy. Psychiatric/Behavioral: Negative. Negative for behavioral problems and hallucinations. All other systems reviewed and are negative. Objective:   Physical Exam  Vitals and nursing note reviewed. Constitutional:       Appearance: He is well-developed. HENT:      Head: Normocephalic and atraumatic. Nose: Nose normal.      Mouth/Throat:      Pharynx: Uvula midline. Eyes:      Conjunctiva/sclera: Conjunctivae normal.      Pupils: Pupils are equal, round, and reactive to light. Cardiovascular:      Rate and Rhythm: Normal rate and regular rhythm. Heart sounds: Normal heart sounds.    Pulmonary: Effort: Pulmonary effort is normal.      Breath sounds: Normal breath sounds. Abdominal:      General: Bowel sounds are normal.      Palpations: Abdomen is soft. Musculoskeletal:      Cervical back: Normal range of motion and neck supple. Skin:     General: Skin is warm and dry. Neurological:      Mental Status: He is alert and oriented to person, place, and time. Audio:                Cerumenremoval     Auditory canal(s) both ears completely obstructed with cerumen. A microscope was used due to deep impaction of the cerumen. Cerumen was gently removed using soft plastic curette, suction. Tympanic membranes are intact following the procedure. Auditory canals appear normal.                       Assessment:       Diagnosis Orders   1. Bilateral impacted cerumen                Plan:      Cerumen impaction   Will follow up with patient in 6 months unless the patient has further issues. Discussed H2O2 andirrigation bi-weekly for maintenance    Hearing is diminished. Pt does not have hearing aids at this time. Pt is  a candidate for hearing aids and is not interested in discussing this with audiology. Also discussed the importance of hearing protection from now on to preserve remaining hearing.      Call or return to clinic prn if these symptoms worsen or fail to improve as anticipated    Follow up in 6 month(s)

## 2021-05-26 NOTE — PROGRESS NOTES
This patient was referred for audiometric/tympanometric testing by Dr. Asya Desir due to hearing loss. Audiometry revealed hearing sensitivity within normal limits through 1000 Hz sloping to moderately-severe sensorineural hearing loss. Left ear revealed mild sloping moderately-severe mixed hearing loss. Asymmetric hearing was noted and discussed with physician   Reliability was good. Speech reception thresholds were in good agreement with the pure tone averages, bilaterally. Speech discrimination scores were good, bilaterally. Tympanometry revealed normal middle ear peak pressure and compliance, bilaterally. The results were reviewed with the patient and physician. Recommendations for follow up will be made pending physician consult.     Janeth Tejada/CCC-A  [place] Lic # [id number]

## 2021-06-04 ENCOUNTER — CARE COORDINATION (OUTPATIENT)
Dept: CASE MANAGEMENT | Age: 71
End: 2021-06-04

## 2021-06-04 NOTE — CARE COORDINATION
18 [facility], [address] [place] [address] ENT [facility]   12/16/2021  1:30 PM Aruna Harris MD TRAIL PC Samson Kerr LPN

## 2021-06-16 ENCOUNTER — CARE COORDINATION (OUTPATIENT)
Dept: CASE MANAGEMENT | Age: 71
End: 2021-06-16

## 2021-06-16 NOTE — CARE COORDINATION
Delaney 45 Transitions Follow Up Call    2021    Patient: Mello Wyman  Patient : 1950   MRN: [id number]  Reason for Admission: PNA  Discharge Date: 21 RARS: Readmission Risk Score: 14         Attempted to contact patient for BPCI-A call. Contact information left to  requesting call back at the earliest convenience.     Follow Up  Future Appointments   Date Time Provider Tan Miranda   2021 10:30 AM MD WARD [facility]   10/5/2021  9:30 AM Jian Agustin MD [facility]   2021  1:15 PM MIGUE Castaneda - CNP [facility]   12/10/2021  9:45 AM Yeimi Hanna DO [place] [facility]   2021  1:30 PM MD WARD [facility]       Starlyn Runner, RN

## 2021-06-25 ENCOUNTER — CARE COORDINATION (OUTPATIENT)
Dept: CASE MANAGEMENT | Age: 71
End: 2021-06-25

## 2021-06-25 NOTE — CARE COORDINATION
Delaney 45 Transitions Follow Up Call    2021    Patient: Lukas Sadler  Patient : 1950   MRN: [id number]  Reason for Admission:   Discharge Date: 21 RARS: Readmission Risk Score: 14    Attempted to reach patient by phone regarding follow up; BPCI-A. Spoke with patient's spouse; patient is not available at time of this call. Will attempt outreach at a later time.         Cher Paredes RN

## 2021-07-13 ENCOUNTER — CARE COORDINATION (OUTPATIENT)
Dept: CASE MANAGEMENT | Age: 71
End: 2021-07-13

## 2021-07-13 NOTE — CARE COORDINATION
Delaney  Transitions Follow Up Call    2021    Patient: Jazmin Rueda  Patient : 1950   MRN: [id number]  Reason for Admission:   Discharge Date: 21 RARS: Readmission Risk Score: 14         Spoke with: Jazmin Rueda, patient    Contacted patient for final BPCI-A follow up. Chris [facility] stated that he is doing pretty good. Reports breathing has improved. He is using 3.5 L of oxygen at night. Stated he hasn't had to use the oxygen during the day. No c/o chest pain/discomfort, pressure, tightness, cough, fever, chills, swelling. Appetite has been good and he is staying hydrated. Reports he's had a gradual weight gain d/t eating a lot. He is aware of when to contact his doctor with any new or worsening symptoms. He confirmed he has all of his medications. Informed him that this will be the final follow up call. No questions or concerns for CTN at this time.         Follow Up  Future Appointments   Date Time Provider Tan Miranda   2021 10:30 AM MD WARD [facility]   10/5/2021  9:30 AM Mejia Flores MD [facility]   2021  1:15 PM MIGUE Solis - CNP [facility]   12/10/2021  9:45 AM DO Mago Arthurva 93 ENT [facility]   2021  1:30 PM MD WARD [facility]       Akosua Eid RN

## 2021-07-26 ENCOUNTER — TELEPHONE (OUTPATIENT)
Dept: PRIMARY CARE CLINIC | Age: 71
End: 2021-07-26

## 2021-07-26 NOTE — TELEPHONE ENCOUNTER
----- Message from DONALD CARSON [facility] sent at 7/26/2021 10:04 AM EDT -----  Subject: Appointment Request    Reason for Call: Urgent Cough Cold    QUESTIONS  Type of Appointment? Established Patient  Reason for appointment request? Available appointments did not meet   patient need  Additional Information for Provider? pt stated not feeling well, runny   nose and wants to be seen right away, screened green   ---------------------------------------------------------------------------  --------------  CALL BACK INFO  What is the best way for the office to contact you? OK to leave message on   voicemail  Preferred Call Back Phone Number? [phone]  ---------------------------------------------------------------------------  --------------  SCRIPT ANSWERS  Relationship to Patient? Self  Are you currently unable to finish sentences due to any difficulty   breathing? No  Are you unable to swallow liquids? No  Are you having fevers (100.4 or greater), chills, or sweats? No  Do you have COPD, asthma or a chronic lung condition? Yes   Have you been diagnosed with, awaiting test results for, or told that you   are suspected of having COVID-19 (Coronavirus)? (If patient has tested   negative or was tested as a requirement for work, school, or travel and   not based on symptoms, answer no)? No  Do you currently have flu-like symptoms including fever or chills, cough,   shortness of breath, difficulty breathing, or new loss of taste or smell? No  Have you had close contact with someone with COVID-19 in the last 14 days? No  (Service Expert  click yes below to proceed with [facility] As Usual   Scheduling)?  Yes

## 2021-07-27 ENCOUNTER — OFFICE VISIT (OUTPATIENT)
Dept: PRIMARY CARE CLINIC | Age: 71
End: 2021-07-27
Payer: MEDICARE

## 2021-07-27 VITALS
SYSTOLIC BLOOD PRESSURE: 80 MMHG | TEMPERATURE: 98.1 F | BODY MASS INDEX: 25.06 KG/M2 | OXYGEN SATURATION: 87 % | DIASTOLIC BLOOD PRESSURE: 56 MMHG | RESPIRATION RATE: 18 BRPM | WEIGHT: 185 LBS | HEIGHT: 72 IN | HEART RATE: 94 BPM

## 2021-07-27 DIAGNOSIS — J44.9 CHRONIC OBSTRUCTIVE PULMONARY DISEASE, UNSPECIFIED COPD TYPE (HCC): ICD-10-CM

## 2021-07-27 DIAGNOSIS — A31.0 MAI (MYCOBACTERIUM AVIUM-INTRACELLULARE) (HCC): ICD-10-CM

## 2021-07-27 DIAGNOSIS — J06.9 UPPER RESPIRATORY TRACT INFECTION, UNSPECIFIED TYPE: Primary | ICD-10-CM

## 2021-07-27 DIAGNOSIS — A31.2: ICD-10-CM

## 2021-07-27 DIAGNOSIS — J96.11 CHRONIC RESPIRATORY FAILURE WITH HYPOXIA (HCC): ICD-10-CM

## 2021-07-27 PROBLEM — I71.21 THORACIC ASCENDING AORTIC ANEURYSM: Status: RESOLVED | Noted: 2018-11-15 | Resolved: 2021-07-27

## 2021-07-27 PROBLEM — J43.9 BULLOUS EMPHYSEMA (HCC): Status: RESOLVED | Noted: 2018-11-12 | Resolved: 2021-07-27

## 2021-07-27 PROBLEM — J84.10 PULMONARY EMPHYSEMA WITH FIBROSIS OF LUNG (HCC): Status: RESOLVED | Noted: 2018-11-15 | Resolved: 2021-07-27

## 2021-07-27 PROBLEM — J90 PLEURAL EFFUSION ON RIGHT: Status: RESOLVED | Noted: 2019-10-03 | Resolved: 2021-07-27

## 2021-07-27 PROBLEM — R09.02 HYPOXIA: Status: RESOLVED | Noted: 2021-01-01 | Resolved: 2021-07-27

## 2021-07-27 PROBLEM — J43.9 PULMONARY EMPHYSEMA WITH FIBROSIS OF LUNG (HCC): Status: RESOLVED | Noted: 2018-11-15 | Resolved: 2021-07-27

## 2021-07-27 PROCEDURE — 1036F TOBACCO NON-USER: CPT | Performed by: INTERNAL MEDICINE

## 2021-07-27 PROCEDURE — 99213 OFFICE O/P EST LOW 20 MIN: CPT | Performed by: INTERNAL MEDICINE

## 2021-07-27 PROCEDURE — 3017F COLORECTAL CA SCREEN DOC REV: CPT | Performed by: INTERNAL MEDICINE

## 2021-07-27 PROCEDURE — 4040F PNEUMOC VAC/ADMIN/RCVD: CPT | Performed by: INTERNAL MEDICINE

## 2021-07-27 PROCEDURE — 1123F ACP DISCUSS/DSCN MKR DOCD: CPT | Performed by: INTERNAL MEDICINE

## 2021-07-27 PROCEDURE — G8926 SPIRO NO PERF OR DOC: HCPCS | Performed by: INTERNAL MEDICINE

## 2021-07-27 PROCEDURE — G8417 CALC BMI ABV UP PARAM F/U: HCPCS | Performed by: INTERNAL MEDICINE

## 2021-07-27 PROCEDURE — G8427 DOCREV CUR MEDS BY ELIG CLIN: HCPCS | Performed by: INTERNAL MEDICINE

## 2021-07-27 PROCEDURE — 3023F SPIROM DOC REV: CPT | Performed by: INTERNAL MEDICINE

## 2021-07-27 RX ORDER — AZITHROMYCIN 250 MG/1
250 TABLET, FILM COATED ORAL SEE ADMIN INSTRUCTIONS
Qty: 6 TABLET | Refills: 0 | Status: SHIPPED | OUTPATIENT
Start: 2021-07-27 | End: 2021-08-01

## 2021-07-27 SDOH — ECONOMIC STABILITY: FOOD INSECURITY: WITHIN THE PAST 12 MONTHS, YOU WORRIED THAT YOUR FOOD WOULD RUN OUT BEFORE YOU GOT MONEY TO BUY MORE.: NEVER TRUE

## 2021-07-27 SDOH — ECONOMIC STABILITY: FOOD INSECURITY: WITHIN THE PAST 12 MONTHS, THE FOOD YOU BOUGHT JUST DIDN'T LAST AND YOU DIDN'T HAVE MONEY TO GET MORE.: NEVER TRUE

## 2021-07-27 ASSESSMENT — SOCIAL DETERMINANTS OF HEALTH (SDOH): HOW HARD IS IT FOR YOU TO PAY FOR THE VERY BASICS LIKE FOOD, HOUSING, MEDICAL CARE, AND HEATING?: NOT HARD AT ALL

## 2021-07-27 NOTE — PROGRESS NOTES
Hong Cuff  7/27/21     Chief Complaint   Patient presents with    Sinus Problem     cough pnd congsetion         No Known Allergies     Current Outpatient Medications   Medication Sig Dispense Refill    moxifloxacin (AVELOX) 400 MG tablet Take 400 mg by mouth three times a week Monday, Wednesday, and Friday      Fluticasone-Umeclidin-Vilant (TRELEGY ELLIPTA IN) Inhale 1 puff into the lungs daily      ipratropium-albuterol (DUONEB) 0.5-2.5 (3) MG/3ML SOLN nebulizer solution Inhale 3 mLs into the lungs every 4 hours (while awake) 360 mL     Respiratory Therapy Supplies (FULL KIT NEBULIZER SET) MISC Use as directed with nebulized medication. 1 each 0    oxyCODONE-acetaminophen (PERCOCET) 7.5-325 MG per tablet Take 1 tablet by mouth 2 times daily as needed for Pain.  etodolac (LODINE) 500 MG tablet Take 1 tablet by mouth 2 times daily (Patient not taking: Reported on 5/26/2021) 180 tablet 3     No current facility-administered medications for this visit. HPI: He comes in today complaining nasal sinus congestion and slight cough. He denies any fever or chills. His oxygen concentrator is not working well today and he is going to call [facility] to get that taken care of. He follows up with his pulmonologist for management of his severe COPD and chronic hypoxia. Review of Systems: as per HPI      Physical Exam:    Patient is a 78 y.o. male. Patient appears to be in no distress. Breathing comfortably. Ambulates without assistance. He is alert and oriented HEENT: Mild nasal congestion. Neck supple, no adenopathy or bruits. Heart RR, no MGR. Lungs clear.          Assessment:    Upper respiratory tract infection, unspecified type    Chronic obstructive pulmonary disease, unspecified COPD type ([address] followed closely by his pulmonologist.    Chronic respiratory failure with hypoxia ([address] followed by his pulmonologist.  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      Discussion Notes: We will prescribe a Z-Benja as directed and he will contact his oxygen supplier to replace his oxygen concentrator as soon as he gets home. Will call in a few days of his current symptoms or not improved. He will follow-up with his pulmonologist and infectious disease specialist as per their instructions.

## 2021-08-19 ENCOUNTER — OFFICE VISIT (OUTPATIENT)
Dept: PRIMARY CARE CLINIC | Age: 71
End: 2021-08-19
Payer: MEDICARE

## 2021-08-19 VITALS
SYSTOLIC BLOOD PRESSURE: 110 MMHG | RESPIRATION RATE: 18 BRPM | BODY MASS INDEX: 24.79 KG/M2 | HEART RATE: 82 BPM | HEIGHT: 72 IN | TEMPERATURE: 97.8 F | OXYGEN SATURATION: 82 % | DIASTOLIC BLOOD PRESSURE: 70 MMHG | WEIGHT: 183 LBS

## 2021-08-19 DIAGNOSIS — A31.2: ICD-10-CM

## 2021-08-19 DIAGNOSIS — J44.9 CHRONIC OBSTRUCTIVE PULMONARY DISEASE, UNSPECIFIED COPD TYPE (HCC): ICD-10-CM

## 2021-08-19 DIAGNOSIS — J32.0 MAXILLARY SINUSITIS, UNSPECIFIED CHRONICITY: Primary | ICD-10-CM

## 2021-08-19 DIAGNOSIS — J96.11 CHRONIC RESPIRATORY FAILURE WITH HYPOXIA (HCC): ICD-10-CM

## 2021-08-19 PROCEDURE — 1036F TOBACCO NON-USER: CPT | Performed by: INTERNAL MEDICINE

## 2021-08-19 PROCEDURE — 4040F PNEUMOC VAC/ADMIN/RCVD: CPT | Performed by: INTERNAL MEDICINE

## 2021-08-19 PROCEDURE — 99214 OFFICE O/P EST MOD 30 MIN: CPT | Performed by: INTERNAL MEDICINE

## 2021-08-19 PROCEDURE — G8926 SPIRO NO PERF OR DOC: HCPCS | Performed by: INTERNAL MEDICINE

## 2021-08-19 PROCEDURE — 1123F ACP DISCUSS/DSCN MKR DOCD: CPT | Performed by: INTERNAL MEDICINE

## 2021-08-19 PROCEDURE — 3023F SPIROM DOC REV: CPT | Performed by: INTERNAL MEDICINE

## 2021-08-19 PROCEDURE — G8420 CALC BMI NORM PARAMETERS: HCPCS | Performed by: INTERNAL MEDICINE

## 2021-08-19 PROCEDURE — 3017F COLORECTAL CA SCREEN DOC REV: CPT | Performed by: INTERNAL MEDICINE

## 2021-08-19 PROCEDURE — G8427 DOCREV CUR MEDS BY ELIG CLIN: HCPCS | Performed by: INTERNAL MEDICINE

## 2021-08-19 RX ORDER — AZITHROMYCIN 250 MG/1
250 TABLET, FILM COATED ORAL SEE ADMIN INSTRUCTIONS
Qty: 6 TABLET | Refills: 0 | Status: SHIPPED | OUTPATIENT
Start: 2021-08-19 | End: 2021-08-24

## 2021-08-19 RX ORDER — ETODOLAC 500 MG/1
500 TABLET, FILM COATED ORAL DAILY
Status: ON HOLD | COMMUNITY
End: 2022-03-18 | Stop reason: HOSPADM

## 2021-08-19 NOTE — PROGRESS NOTES
Kamryn Sea  8/19/21     Chief Complaint   Patient presents with    COPD     3 month follow up         No Known Allergies     Current Outpatient Medications   Medication Sig Dispense Refill    Handicap Placard MISC by Does not apply route Patient cannot walk 200 ft without stopping to rest.    Expiration 5 yrs. 1 each 0    azithromycin (ZITHROMAX) 250 MG tablet Take 1 tablet by mouth See Admin Instructions for 5 days 500mg on day 1 followed by 250mg on days 2 - 5 6 tablet 0    etodolac (LODINE) 500 MG tablet Take 500 mg by mouth daily      moxifloxacin (AVELOX) 400 MG tablet Take 400 mg by mouth three times a week Monday, Wednesday, and Friday      Fluticasone-Umeclidin-Vilant (TRELEGY ELLIPTA IN) Inhale 1 puff into the lungs daily      ipratropium-albuterol (DUONEB) 0.5-2.5 (3) MG/3ML SOLN nebulizer solution Inhale 3 mLs into the lungs every 4 hours (while awake) 360 mL     Respiratory Therapy Supplies (FULL KIT NEBULIZER SET) MISC Use as directed with nebulized medication. 1 each 0    oxyCODONE-acetaminophen (PERCOCET) 7.5-325 MG per tablet Take 1 tablet by mouth 2 times daily as needed for Pain. No current facility-administered medications for this visit. HPI: Patient returns for follow-up visit. Currently he is doing fairly well with his supplemental oxygen at home. He generally keeps his oxygen saturation in the low 90s with supplemental oxygen and without it he goes into the 80s. He follows up with his pulmonologist regularly. He states that when he took the Z-Benja recently it did seem to help his symptoms of sinusitis but he feels the symptoms are coming back and that sometimes precedes an exacerbation of his COPD and would like another Z-Benja. He denies any chest pain.   He remains on all of his usual meds and supplements the same as listed on his med list.  He follows up with his infectious disease specialist and pulmonologist for his chronic pulmonary infection due to Mycobacterium avium. Review of Systems: as per HPI      Physical Exam:    Patient is a 73 y.o. male. Patient appears to be in no distress. Breathing comfortably at rest currently without supplemental oxygen. . Ambulates without assistance. HEENT: Mild nasal congestion with a small amount of yellow postnasal drainage noted. Neck supple, no adenopathy or bruits. Heart RR, no MGR. Lungs clear. Abd: normal  Ext: no edema. Peripheral pulses: normal.  No neurologic deficits noted. Assessment:    Mary Finley was seen today for copd. Diagnoses and all orders for this visit:    Chronic obstructive pulmonary disease, unspecified COPD type ([facility]  -     Handicap Placard MISC; by Does not apply route Patient cannot walk 200 ft without stopping to rest.    Expiration 5 yrs. Maxillary sinusitis, unspecified chronicity, recurring.  -     azithromycin (ZITHROMAX) 250 MG tablet; Take 1 tablet by mouth See Admin Instructions for 5 days 500mg on day 1 followed by 250mg on days 2 - 5    Chronic respiratory failure with hypoxia ([email or web address] followed by his pulmonologist.    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          Discussion Notes: We will prescribe a Z-Benja to take as directed and he will call in a week if his symptoms are improved. Otherwise he will continue continuous supplemental oxygen and follow-up with his pulmonologist and infectious disease specialist as per their instructions. He is due to return here and 2 months for routine follow-up visit.

## 2021-09-29 ENCOUNTER — OFFICE VISIT (OUTPATIENT)
Dept: PRIMARY CARE CLINIC | Age: 71
End: 2021-09-29
Payer: MEDICARE

## 2021-09-29 VITALS
WEIGHT: 184 LBS | TEMPERATURE: 97.2 F | BODY MASS INDEX: 24.39 KG/M2 | RESPIRATION RATE: 18 BRPM | HEART RATE: 95 BPM | HEIGHT: 73 IN | OXYGEN SATURATION: 85 % | DIASTOLIC BLOOD PRESSURE: 78 MMHG | SYSTOLIC BLOOD PRESSURE: 118 MMHG

## 2021-09-29 DIAGNOSIS — J44.9 CHRONIC OBSTRUCTIVE PULMONARY DISEASE, UNSPECIFIED COPD TYPE (HCC): ICD-10-CM

## 2021-09-29 DIAGNOSIS — J96.11 CHRONIC RESPIRATORY FAILURE WITH HYPOXIA (HCC): ICD-10-CM

## 2021-09-29 DIAGNOSIS — J06.9 UPPER RESPIRATORY TRACT INFECTION, UNSPECIFIED TYPE: Primary | ICD-10-CM

## 2021-09-29 PROCEDURE — G8926 SPIRO NO PERF OR DOC: HCPCS | Performed by: INTERNAL MEDICINE

## 2021-09-29 PROCEDURE — 3017F COLORECTAL CA SCREEN DOC REV: CPT | Performed by: INTERNAL MEDICINE

## 2021-09-29 PROCEDURE — 99213 OFFICE O/P EST LOW 20 MIN: CPT | Performed by: INTERNAL MEDICINE

## 2021-09-29 PROCEDURE — 3023F SPIROM DOC REV: CPT | Performed by: INTERNAL MEDICINE

## 2021-09-29 PROCEDURE — 1123F ACP DISCUSS/DSCN MKR DOCD: CPT | Performed by: INTERNAL MEDICINE

## 2021-09-29 PROCEDURE — G8427 DOCREV CUR MEDS BY ELIG CLIN: HCPCS | Performed by: INTERNAL MEDICINE

## 2021-09-29 PROCEDURE — 1036F TOBACCO NON-USER: CPT | Performed by: INTERNAL MEDICINE

## 2021-09-29 PROCEDURE — 4040F PNEUMOC VAC/ADMIN/RCVD: CPT | Performed by: INTERNAL MEDICINE

## 2021-09-29 PROCEDURE — G8420 CALC BMI NORM PARAMETERS: HCPCS | Performed by: INTERNAL MEDICINE

## 2021-09-29 RX ORDER — AZITHROMYCIN 250 MG/1
250 TABLET, FILM COATED ORAL SEE ADMIN INSTRUCTIONS
Qty: 6 TABLET | Refills: 1 | Status: SHIPPED | OUTPATIENT
Start: 2021-09-29 | End: 2021-10-04

## 2021-09-29 NOTE — PROGRESS NOTES
Kamryn Osei  9/29/21     Chief Complaint   Patient presents with    Sinus Problem     congestion, runny nose,cough        No Known Allergies     Current Outpatient Medications   Medication Sig Dispense Refill    azithromycin (ZITHROMAX) 250 MG tablet Take 1 tablet by mouth See Admin Instructions for 5 days 500mg on day 1 followed by 250mg on days 2 - 5 6 tablet 1    Handicap Placard MISC by Does not apply route Patient cannot walk 200 ft without stopping to rest.    Expiration 5 yrs. 1 each 0    etodolac (LODINE) 500 MG tablet Take 500 mg by mouth daily      moxifloxacin (AVELOX) 400 MG tablet Take 400 mg by mouth three times a week Monday, Wednesday, and Friday      Fluticasone-Umeclidin-Vilant (TRELEGY ELLIPTA IN) Inhale 1 puff into the lungs daily      ipratropium-albuterol (DUONEB) 0.5-2.5 (3) MG/3ML SOLN nebulizer solution Inhale 3 mLs into the lungs every 4 hours (while awake) 360 mL     Respiratory Therapy Supplies (FULL KIT NEBULIZER SET) MISC Use as directed with nebulized medication. 1 each 0    oxyCODONE-acetaminophen (PERCOCET) 7.5-325 MG per tablet Take 1 tablet by mouth 2 times daily as needed for Pain. No current facility-administered medications for this visit. HPI: Comes in complaining of nasal and sinus congestion and small amount of yellow nasal drainage and sputum. He is concerned because of his history of severe COPD for which he is on chronic supplemental oxygen. He denies any fever or chills. His oxygen saturation has been around 90 on supplemental oxygen. He follows up with his pulmonologist for management of his pulmonary issues. Review of Systems: as per HPI      Physical Exam:    Patient is a 73 y.o. male. Patient appears to be in no distress. Breathing comfortably on supplemental oxygen. Ambulates without assistance. HEENT: Mild nasal and sinus congestion with a small amount of yellow postnasal drainage noted. Neck supple, no adenopathy or bruits.   Heart RR, no MGR. Lungs clear. Abd: normal  Ext: no edema. Peripheral pulses: normal.  No neurologic deficits noted. Assessment:    Osvaldo Alvarez was seen today for sinus problem. Diagnoses and all orders for this visit:    Upper respiratory tract infection, unspecified type  -     azithromycin (ZITHROMAX) 250 MG tablet; Take 1 tablet by mouth See Admin Instructions for 5 days 500mg on day 1 followed by 250mg on days 2 - 5    Chronic obstructive pulmonary disease, unspecified COPD type ([facility] [address] [facility] stable and followed by his pulmonologist.    Chronic respiratory failure with hypoxia ([facility] [address] [facility] stable and followed by his pulmonologist.          Discussion Notes: He is given a prescription for a Z-Benja as directed with 1 refill. He will continue all of his other meds and supplements and inhalers the same. He is advised to stay adequately hydrated. He will call in a few days if his symptoms are not improving.

## 2021-10-05 PROBLEM — A31.2: Status: RESOLVED | Noted: 2019-08-15 | Resolved: 2021-10-05

## 2021-12-10 ENCOUNTER — OFFICE VISIT (OUTPATIENT)
Dept: ENT CLINIC | Age: 71
End: 2021-12-10
Payer: MEDICARE

## 2021-12-10 VITALS
DIASTOLIC BLOOD PRESSURE: 71 MMHG | OXYGEN SATURATION: 77 % | SYSTOLIC BLOOD PRESSURE: 115 MMHG | TEMPERATURE: 98.1 F | HEART RATE: 81 BPM | BODY MASS INDEX: 24.52 KG/M2 | WEIGHT: 185 LBS | HEIGHT: 73 IN

## 2021-12-10 DIAGNOSIS — H61.23 BILATERAL IMPACTED CERUMEN: Primary | ICD-10-CM

## 2021-12-10 PROCEDURE — 69210 REMOVE IMPACTED EAR WAX UNI: CPT | Performed by: OTOLARYNGOLOGY

## 2021-12-10 PROCEDURE — G8484 FLU IMMUNIZE NO ADMIN: HCPCS | Performed by: OTOLARYNGOLOGY

## 2021-12-10 PROCEDURE — G8427 DOCREV CUR MEDS BY ELIG CLIN: HCPCS | Performed by: OTOLARYNGOLOGY

## 2021-12-10 PROCEDURE — 1036F TOBACCO NON-USER: CPT | Performed by: OTOLARYNGOLOGY

## 2021-12-10 PROCEDURE — 1123F ACP DISCUSS/DSCN MKR DOCD: CPT | Performed by: OTOLARYNGOLOGY

## 2021-12-10 PROCEDURE — 4040F PNEUMOC VAC/ADMIN/RCVD: CPT | Performed by: OTOLARYNGOLOGY

## 2021-12-10 PROCEDURE — 3017F COLORECTAL CA SCREEN DOC REV: CPT | Performed by: OTOLARYNGOLOGY

## 2021-12-10 PROCEDURE — 99213 OFFICE O/P EST LOW 20 MIN: CPT | Performed by: OTOLARYNGOLOGY

## 2021-12-10 PROCEDURE — G8420 CALC BMI NORM PARAMETERS: HCPCS | Performed by: OTOLARYNGOLOGY

## 2021-12-10 ASSESSMENT — ENCOUNTER SYMPTOMS
SHORTNESS OF BREATH: 0
EYE PAIN: 0
GASTROINTESTINAL NEGATIVE: 1
EYE DISCHARGE: 0
APNEA: 0
COLOR CHANGE: 0
ABDOMINAL PAIN: 0
RESPIRATORY NEGATIVE: 1
VOMITING: 0
EYES NEGATIVE: 1
DIARRHEA: 0
CHEST TIGHTNESS: 0

## 2021-12-10 NOTE — PROGRESS NOTES
Subjective:      Patient ID:  Jessica Wilkes is a 73 y.o. male. HPI:    Cerumen Impaction  Patient presents with diminished hearing left ear for the past 1 year. There is a prior history of cerumen impaction. The patient was not using ear drops to loosen wax immediately prior to this visit. Hearing Loss  Patient presents today with complaints of hearing loss. Concern regarding hearing has been present for 1 year. He has not failed a prior hearing test.  The patient reports turning up the T.V., saying \"huh\" or \"what\".            Past Medical History:   Diagnosis Date    Acute and chronic respiratory failure with hypoxia ([address] 10/12/2017    Acute heart failure with preserved ejection fraction ([address] 4/8/2021    Acute respiratory disease due to severe acute respiratory syndrome coronavirus 2 (SARS-CoV-2) 01/01/2021    Acute respiratory failure with hypoxia (HCC) 11/12/2018    Bullous emphysema ([address] 11/12/2018    Chronic back pain     Degeneration of umbar intervertebral disc    Colon cancer screening     COPD (chronic obstructive pulmonary disease) ([address]     Cough with hemoptysis 04/23/2019    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 ([address] 08/15/2019    First seen by ID; treatment started 9/4/2019    Emphysema lung ([address]     Glucose intolerance 06/10/2019    Hilar adenopathy 06/10/2019    Hypophosphatemia 06/10/2019    Infection due to parainfluenza virus 3 06/10/2019    Left upper lobe pneumonia 10/12/2017    Pleural effusion on right 10/03/2019    Pulmonary emphysema with fibrosis of lung ([address] 11/15/2018    Pulmonary Mycobacterium avium complex (MAC) infection ([address] 07/12/2019    BAL results finally completed this date    Rhinovirus infection 10/16/2020    Right lower lobe pneumonia 11/15/2018    Recurrent 4/23/19    S/P lumbar fusion 06/01/2019    Thoracic ascending aortic aneurysm ([address] 11/15/2018    Proximal ascending aorta; 3.8 cm; 11/15/18     Past Surgical History:   Procedure Laterality Date    ABSCESS DRAINAGE  2006    X2 RECTAL ABSCESS    BRONCHOSCOPY N/A 2019    BRONCHOSCOPY BRUSHINGS performed by Monica Najera MD at [address] [place] [address] N/A 10/7/2019    BRONCHOSCOPY DIAGNOSTIC OR CELL [address] ONLY performed by Monica Najera MD at [address] [place] [address] N/A 2021    BRONCHOSCOPY performed by Monica Najera MD at [address] COLONOSCOPY  2013    HC INSERT PICC CATH, 5/> YRS  4/10/2021         LUMBAR FUSION  2019    [facility]     Family History   Problem Relation Age of Onset    Other Mother          age 80    Other Father          age 80     Social History     Socioeconomic History    Marital status:      Spouse name: None    Number of children: None    Years of education: None    Highest education level: None   Occupational History    None   Tobacco Use    Smoking status: Former Smoker     Packs/day: 2.00     Years: 46.00     Pack years: 92.00     Types: Cigarettes     Start date: 10/12/1968     Quit date: 10/12/2014     Years since quittin.1    Smokeless tobacco: Never Used   Vaping Use    Vaping Use: Never used   Substance and Sexual Activity    Alcohol use: No    Drug use: No    Sexual activity: Not Currently     Partners: Female   Other Topics Concern    None   Social History Narrative    None     Social Determinants of Health     Financial Resource Strain: Low Risk     Difficulty of Paying Living Expenses: Not hard at all   Food Insecurity: No Food Insecurity    Worried About Running Out of Food in the Last Year: Never true    Tess of Food in the Last Year: Never true   Transportation Needs:     Lack of Transportation (Medical): Not on file    Lack of Transportation (Non-Medical):  Not on file   Physical Activity:     Days of Exercise per Week: Not on file    Minutes of Exercise per Session: Not on file   Stress:     Feeling of Stress : Not on file Social Connections:     Frequency of Communication with Friends and Family: Not on file    Frequency of Social Gatherings with Friends and Family: Not on file    Attends Hinduism Services: Not on file    Active Member of Clubs or Organizations: Not on file    Attends Club or Organization Meetings: Not on file    Marital Status: Not on file   Intimate Partner Violence:     Fear of Current or Ex-Partner: Not on file    Emotionally Abused: Not on file    Physically Abused: Not on file    Sexually Abused: Not on file   Housing Stability:     Unable to Pay for Housing in the Last Year: Not on file    Number of [place] in the Last Year: Not on file    Unstable Housing in the Last Year: Not on file     No Known Allergies      Review of Systems   Constitutional: Negative. Negative for appetite change. HENT: Positive for congestion and hearing loss. Eyes: Negative. Negative for pain, discharge and visual disturbance. Respiratory: Negative. Negative for apnea, chest tightness and shortness of breath. Cardiovascular: Negative. Negative for chest pain, palpitations and leg swelling. Gastrointestinal: Negative. Negative for abdominal pain, diarrhea and vomiting. Endocrine: Negative for cold intolerance, heat intolerance and polydipsia. Genitourinary: Negative. Negative for dysuria, flank pain and hematuria. Musculoskeletal: Negative. Negative for arthralgias, gait problem and neck pain. Skin: Negative. Negative for color change, pallor and rash. Allergic/Immunologic: Negative for environmental allergies, food allergies and immunocompromised state. Neurological: Negative. Negative for dizziness, numbness and headaches. Hematological: Negative for adenopathy. Psychiatric/Behavioral: Negative. Negative for behavioral problems and hallucinations. All other systems reviewed and are negative. Objective:   Physical Exam  Vitals and nursing note reviewed. Constitutional:       Appearance: He is well-developed. HENT:      Head: Normocephalic and atraumatic. Nose: Nose normal.      Mouth/Throat:      Pharynx: Uvula midline. Eyes:      Conjunctiva/sclera: Conjunctivae normal.      Pupils: Pupils are equal, round, and reactive to light. Cardiovascular:      Rate and Rhythm: Normal rate and regular rhythm. Heart sounds: Normal heart sounds. Pulmonary:      Effort: Pulmonary effort is normal.      Breath sounds: Normal breath sounds. Abdominal:      General: Bowel sounds are normal.      Palpations: Abdomen is soft. Musculoskeletal:      Cervical back: Normal range of motion and neck supple. Skin:     General: Skin is warm and dry. Neurological:      Mental Status: He is alert and oriented to person, place, and time. Audio:                Cerumenremoval     Auditory canal(s) both ears completely obstructed with cerumen. A microscope was used due to deep impaction of the cerumen. Cerumen was gently removed using soft plastic curette, suction. Tympanic membranes are intact following the procedure. Auditory canals appear normal.                       Assessment:       Diagnosis Orders   1. Bilateral impacted cerumen                Plan:      Cerumen impaction   Will follow up with patient in 6 months unless the patient has further issues. Discussed H2O2 andirrigation bi-weekly for maintenance    Hearing is diminished. Pt does not have hearing aids at this time. Pt is  a candidate for hearing aids and is not interested in discussing this with audiology. Also discussed the importance of hearing protection from now on to preserve remaining hearing. Call or return to clinic prn if these symptoms worsen or fail to improve as anticipated    Follow up in 6 month(s)                  Rj Kaur  1950    I have discussed the case, including pertinent history and exam findings with the resident.  I have seen and examined the patient and the key elements of the encounter have been performed by me. I agree with the assessment, plan and orders as documented by the  resident              Remainder of medical problems as per  resident note. Patient seen and examined. Agree with above exam, assessment and plan.       Electronically signed by Rubina Renae DO on 12/13/21 at 4:38 PM EST

## 2021-12-16 ENCOUNTER — OFFICE VISIT (OUTPATIENT)
Dept: PRIMARY CARE CLINIC | Age: 71
End: 2021-12-16
Payer: MEDICARE

## 2021-12-16 VITALS
DIASTOLIC BLOOD PRESSURE: 60 MMHG | WEIGHT: 183 LBS | RESPIRATION RATE: 18 BRPM | BODY MASS INDEX: 24.25 KG/M2 | OXYGEN SATURATION: 80 % | TEMPERATURE: 98 F | HEIGHT: 73 IN | SYSTOLIC BLOOD PRESSURE: 110 MMHG | HEART RATE: 80 BPM

## 2021-12-16 DIAGNOSIS — J44.1 ACUTE EXACERBATION OF CHRONIC OBSTRUCTIVE PULMONARY DISEASE (COPD) (HCC): ICD-10-CM

## 2021-12-16 DIAGNOSIS — E78.6 LOW HDL (UNDER 40): ICD-10-CM

## 2021-12-16 DIAGNOSIS — Z12.5 PROSTATE CANCER SCREENING: ICD-10-CM

## 2021-12-16 DIAGNOSIS — Z00.00 ROUTINE GENERAL MEDICAL EXAMINATION AT A HEALTH CARE FACILITY: Primary | ICD-10-CM

## 2021-12-16 PROCEDURE — 1123F ACP DISCUSS/DSCN MKR DOCD: CPT | Performed by: INTERNAL MEDICINE

## 2021-12-16 PROCEDURE — G8484 FLU IMMUNIZE NO ADMIN: HCPCS | Performed by: INTERNAL MEDICINE

## 2021-12-16 PROCEDURE — G0439 PPPS, SUBSEQ VISIT: HCPCS | Performed by: INTERNAL MEDICINE

## 2021-12-16 PROCEDURE — 4040F PNEUMOC VAC/ADMIN/RCVD: CPT | Performed by: INTERNAL MEDICINE

## 2021-12-16 PROCEDURE — 3017F COLORECTAL CA SCREEN DOC REV: CPT | Performed by: INTERNAL MEDICINE

## 2021-12-16 RX ORDER — AZITHROMYCIN 250 MG/1
250 TABLET, FILM COATED ORAL SEE ADMIN INSTRUCTIONS
Qty: 6 TABLET | Refills: 0 | Status: SHIPPED | OUTPATIENT
Start: 2021-12-16 | End: 2021-12-21

## 2021-12-16 ASSESSMENT — PATIENT HEALTH QUESTIONNAIRE - PHQ9
2. FEELING DOWN, DEPRESSED OR HOPELESS: 0
SUM OF ALL RESPONSES TO PHQ QUESTIONS 1-9: 0
SUM OF ALL RESPONSES TO PHQ QUESTIONS 1-9: 0
SUM OF ALL RESPONSES TO PHQ9 QUESTIONS 1 & 2: 0
1. LITTLE INTEREST OR PLEASURE IN DOING THINGS: 0
SUM OF ALL RESPONSES TO PHQ QUESTIONS 1-9: 0

## 2021-12-16 ASSESSMENT — LIFESTYLE VARIABLES: HOW OFTEN DO YOU HAVE A DRINK CONTAINING ALCOHOL: 0

## 2021-12-16 NOTE — PROGRESS NOTES
Medicare Annual Wellness Visit  Name: Diandra Benavidez Date: 2021   MRN: [id number] Sex: Male   Age: 73 y.o. Ethnicity: Non- / Non    : 1950 Race: White (non-)      Jessica Wilkes is here for Medicare AWV (subsequent )  Patient comes in for his annual wellness visit. Currently he feels fairly well. He continues to use supplemental oxygen as needed and always during the night. He follows up with his pulmonologist for management of his severe COPD and chronic hypoxic respiratory failure. He remains on all of his usual meds and supplements the same as listed on his med list, which was reviewed with him. Currently he denies any chest pain. Also he follows up with his infectious disease specialist for his history of atypical Mycobacterium infection. Screenings for behavioral, psychosocial and functional/safety risks, and cognitive dysfunction are all negative except as indicated below. These results, as well as other patient data from the [address] [facility] form, are documented in Flowsheets linked to this Encounter. No Known Allergies      Prior to Visit Medications    Medication Sig Taking? Authorizing Provider   azithromycin (ZITHROMAX) 250 MG tablet Take 1 tablet by mouth See Admin Instructions for 5 days 500mg on day 1 followed by 250mg on days 2 - 5 Yes Norman Pelletier MD   azithromycin (ZITHROMAX) 500 MG tablet Take 1 tablet by mouth three times a week Yes Carlos Braun MD   Handicap Placard MISC by Does not apply route Patient cannot walk 200 ft without stopping to rest.    Expiration 5 yrs.  Yes Norman Pelletier MD   etodolac (LODINE) 500 MG tablet Take 500 mg by mouth daily Yes Historical Provider, MD   moxifloxacin (AVELOX) 400 MG tablet Take 400 mg by mouth three times a week Monday, Wednesday, and Friday  Yes Historical Provider, MD   Fluticasone-Umeclidin-Vilant (TRELEGY ELLIPTA IN) Inhale 1 puff into the lungs daily Yes Historical Provider, MD ipratropium-albuterol (DUONEB) 0.5-2.5 (3) MG/3ML SOLN nebulizer solution Inhale 3 mLs into the lungs every 4 hours (while awake) Yes Rafat Jaimes, DO   Respiratory Therapy Supplies (FULL KIT NEBULIZER SET) MISC Use as directed with nebulized medication. Yes Jim Jaimes, DO   oxyCODONE-acetaminophen (PERCOCET) 7.5-325 MG per tablet Take 1 tablet by mouth 2 times daily as needed for Pain.   Yes Historical Provider, MD         Past Medical History:   Diagnosis Date    Acute and chronic respiratory failure with hypoxia ([address] 10/12/2017    Acute heart failure with preserved ejection fraction ([address] 4/8/2021    Acute respiratory disease due to severe acute respiratory syndrome coronavirus 2 (SARS-CoV-2) 01/01/2021    Acute respiratory failure with hypoxia ([address] 11/12/2018    Bullous emphysema ([address] 11/12/2018    Chronic back pain     Degeneration of umbar intervertebral disc    Colon cancer screening     COPD (chronic obstructive pulmonary disease) ([address]     Cough with hemoptysis 04/23/2019    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 ([address] 08/15/2019    First seen by ID; treatment started 9/4/2019    Emphysema lung ([address]     Glucose intolerance 06/10/2019    Hilar adenopathy 06/10/2019    Hypophosphatemia 06/10/2019    Infection due to parainfluenza virus 3 06/10/2019    Left upper lobe pneumonia 10/12/2017    Pleural effusion on right 10/03/2019    Pulmonary emphysema with fibrosis of lung ([address] 11/15/2018    Pulmonary Mycobacterium avium complex (MAC) infection ([address] 07/12/2019    BAL results finally completed this date    Rhinovirus infection 10/16/2020    Right lower lobe pneumonia 11/15/2018    Recurrent 4/23/19    S/P lumbar fusion 06/01/2019    Thoracic ascending aortic aneurysm ([address] 11/15/2018    Proximal ascending aorta; 3.8 cm; 11/15/18       Past Surgical History:   Procedure Laterality Date    ABSCESS DRAINAGE  2006    X2 RECTAL ABSCESS    BRONCHOSCOPY N/A 2019    BRONCHOSCOPY BRUSHINGS performed by Nimco Cantu MD at [address] N/A 10/7/2019    BRONCHOSCOPY DIAGNOSTIC OR CELL [address] ONLY performed by Nimco Cantu MD at [address] N/A 2021    BRONCHOSCOPY performed by Nimco Cantu MD at [address] COLONOSCOPY  2013    HC INSERT PICC CATH, 5/> YRS  4/10/2021         [facility]ION  2019    [facility]         Family History   Problem Relation Age of Onset    Other Mother          age 80    Other Father          age 80       CareTeam (Including outside providers/suppliers regularly involved in providing care):   Patient Care Team:  Samantha Wallace MD as PCP - General (Internal Medicine)  Samantha Wallace MD as PCP - [facility] Empaneled Provider  Nimco Cantu MD as Consulting Physician (Pulmonology)    Wt Readings from Last 3 Encounters:   21 183 lb (83 kg)   12/10/21 185 lb (83.9 kg)   21 186 lb (84.4 kg)     Vitals:    21 1314   BP: 110/60   Pulse: 80   Resp: 18   Temp: 98 °F (36.7 °C)   SpO2: (!) 80%   Weight: 183 lb (83 kg)   Height: 6' 1\" (1.854 m)     Body mass index is 24.14 kg/m². Based upon direct observation of the patient, evaluation of cognition reveals recent and remote memory intact. Exam: Patient is currently alert and oriented and breathing comfortably at rest.  HEENT normal.  Neck supple adenopathy or bruits. Heart regular rhythm no murmurs gallops or rubs lungs clear breath sounds somewhat distant. Abdomen soft nontender. Extremities no edema. Peripheral pulses normal.  No neurologic deficits noted. Patient's complete Health Risk Assessment and screening values have been reviewed and are found in Flowsheets. The following problems were reviewed today and where indicated follow up appointments were made and/or referrals ordered.     Positive Risk Factor Screenings with Interventions:            Hearing/Vision:  No exam data present  Hearing/Vision  Do you or your family notice any trouble with your hearing that hasn't been managed with hearing aids?: (!) Yes  Do you have difficulty driving, watching TV, or doing any of your daily activities because of your eyesight?: No  Have you had an eye exam within the past year?: (!) No  Hearing/Vision Interventions:  · He has some hearing impairment and uses hearing aids. He is advised to get an annual eye exam.      Personalized Preventive Plan   Current Health Maintenance Status  Immunization History   Administered Date(s) Administered    Influenza Vaccine, unspecified formulation 10/02/2013, 10/27/2014, 09/22/2016, 11/13/2017    Influenza Virus Vaccine 08/28/2015, 10/12/2016, 11/30/2018    Influenza, High Dose (Fluzone 65 yrs and older) 10/26/2017, 10/01/2019, 09/09/2020    Influenza, Quadv, IM, PF (6 mo and older Fluzone, Flulaval, Fluarix, and 3 yrs and older Afluria) 08/28/2015    Influenza, Triv, inactivated, subunit, adjuvanted, IM (Fluad 65 yrs and older) 11/28/2018    Pneumococcal Conjugate 13-valent ([id number]) 08/28/2015, 09/14/2020    Pneumococcal Conjugate Vaccine 10/12/2015    Pneumococcal Polysaccharide ([id number]) 10/26/2017    Zoster Live (Zostavax) 09/22/2016        Health Maintenance   Topic Date Due    AAA screen  Never done    Hepatitis C screen  Never done    COVID-19 Vaccine (1) Never done    DTaP/Tdap/Td vaccine (1 - Tdap) Never done    Colon cancer screen colonoscopy  Never done    Shingles Vaccine (2 of 3) 11/17/2016    Annual Wellness Visit (AWV)  12/11/2021    Low dose CT lung screening  04/11/2022    Lipid screen  04/08/2026    Flu vaccine  Completed    Pneumococcal 65+ years Vaccine  Completed    Hepatitis A vaccine  Aged Out    Hepatitis B vaccine  Aged Out    Hib vaccine  Aged Out    Meningococcal (ACWY) vaccine  Aged Out     Recommendations for [employer] Due: see orders and patient instructions/AVS.  .   Recommended screening schedule for the next 5-10 years is provided to the patient in written form: see Patient Instructions/AVS.    Patricia Joseph was seen today for medicare aw. Diagnoses and all orders for this visit:    Routine general medical examination at a health care facility    Acute exacerbation of chronic obstructive pulmonary disease (COPD) ([facility]  -     azithromycin (ZITHROMAX) 250 MG tablet; Take 1 tablet by mouth See Admin Instructions for 5 days 500mg on day 1 followed by 250mg on days 2 - 5    Low HDL (under 40)  -     CBC Auto Differential; Future  -     Comprehensive Metabolic Panel; Future  -     Lipid Panel; Future    Prostate cancer screening  -     PSA screening; Future           Discussion: Patient will remain on all his usual meds and supplements the same as well as supplemental oxygen at night and at other times as needed. He will follow-up with his pulmonologist as per his instructions. He will also follow-up with infectious disease.   He is scheduled to return here on 1/18/2022 for his annual physical.

## 2021-12-16 NOTE — PATIENT INSTRUCTIONS
Personalized Preventive Plan for Lizzy Ashton - 12/16/2021  Medicare offers a range of preventive health benefits. Some of the tests and screenings are paid in full while other may be subject to a deductible, co-insurance, and/or copay. Some of these benefits include a comprehensive review of your medical history including lifestyle, illnesses that may run in your family, and various assessments and screenings as appropriate. After reviewing your medical record and screening and assessments performed today your provider may have ordered immunizations, labs, imaging, and/or referrals for you. A list of these orders (if applicable) as well as your Preventive Care list are included within your After Visit Summary for your review. Other Preventive Recommendations:    · A preventive eye exam performed by an eye specialist is recommended every 1-2 years to screen for glaucoma; cataracts, macular degeneration, and other eye disorders. · A preventive dental visit is recommended every 6 months. · Try to get at least 150 minutes of exercise per week or 10,000 steps per day on a pedometer . · Order or download the FREE \"Exercise & Physical Activity: Your Everyday Guide\" from [employer]. Call 1-153.276.6728 or search [employer] online. · You need 7246-3306 mg of calcium and 0054-5292 IU of vitamin D per day. It is possible to meet your calcium requirement with diet alone, but a vitamin D supplement is usually necessary to meet this goal.  · When exposed to the sun, use a sunscreen that protects against both UVA and UVB radiation with an SPF of 30 or greater. Reapply every 2 to 3 hours or after sweating, drying off with a towel, or swimming. · Always wear a seat belt when traveling in a car. Always wear a helmet when riding a bicycle or motorcycle.

## 2022-01-05 ENCOUNTER — TELEPHONE (OUTPATIENT)
Dept: PRIMARY CARE CLINIC | Age: 72
End: 2022-01-05

## 2022-01-05 DIAGNOSIS — J06.9 UPPER RESPIRATORY TRACT INFECTION, UNSPECIFIED TYPE: Primary | ICD-10-CM

## 2022-01-05 RX ORDER — AZITHROMYCIN 250 MG/1
250 TABLET, FILM COATED ORAL SEE ADMIN INSTRUCTIONS
Qty: 6 TABLET | Refills: 0 | Status: SHIPPED | OUTPATIENT
Start: 2022-01-05 | End: 2022-01-10

## 2022-01-10 DIAGNOSIS — R19.7 DIARRHEA, UNSPECIFIED TYPE: Primary | ICD-10-CM

## 2022-01-11 ENCOUNTER — HOSPITAL ENCOUNTER (OUTPATIENT)
Age: 72
Discharge: HOME | End: 2022-01-11
Payer: MEDICARE

## 2022-01-11 DIAGNOSIS — Z12.5 PROSTATE CANCER SCREENING: ICD-10-CM

## 2022-01-11 DIAGNOSIS — E78.6 LOW HDL (UNDER 40): ICD-10-CM

## 2022-01-11 LAB
ALBUMIN SERPL-MCNC: 4.7 G/DL (ref 3.5–5.2)
ALP BLD-CCNC: 92 U/L (ref 40–129)
ALT SERPL-CCNC: 16 U/L (ref 0–40)
ANION GAP SERPL CALCULATED.3IONS-SCNC: 11 MMOL/L (ref 7–16)
AST SERPL-CCNC: 20 U/L (ref 0–39)
BASOPHILS ABSOLUTE: 0.04 E9/L (ref 0–0.2)
BASOPHILS RELATIVE PERCENT: 0.5 % (ref 0–2)
BILIRUB SERPL-MCNC: 0.7 MG/DL (ref 0–1.2)
BUN BLDV-MCNC: 13 MG/DL (ref 6–23)
CALCIUM SERPL-MCNC: 10.5 MG/DL (ref 8.6–10.2)
CHLORIDE BLD-SCNC: 103 MMOL/L (ref 98–107)
CHOLESTEROL, TOTAL: 175 MG/DL (ref 0–199)
CO2: 24 MMOL/L (ref 22–29)
CREAT SERPL-MCNC: 0.9 MG/DL (ref 0.7–1.2)
EOSINOPHILS ABSOLUTE: 0.11 E9/L (ref 0.05–0.5)
EOSINOPHILS RELATIVE PERCENT: 1.3 % (ref 0–6)
GFR AFRICAN AMERICAN: >60
GFR NON-AFRICAN AMERICAN: >60 ML/MIN/1.73
GLUCOSE BLD-MCNC: 90 MG/DL (ref 74–99)
HCT VFR BLD CALC: 61.2 % (ref 37–54)
HDLC SERPL-MCNC: 74 MG/DL
HEMOGLOBIN: 19.8 G/DL (ref 12.5–16.5)
IMMATURE GRANULOCYTES #: 0.03 E9/L
IMMATURE GRANULOCYTES %: 0.4 % (ref 0–5)
LDL CHOLESTEROL CALCULATED: 83 MG/DL (ref 0–99)
LYMPHOCYTES ABSOLUTE: 1.38 E9/L (ref 1.5–4)
LYMPHOCYTES RELATIVE PERCENT: 16.3 % (ref 20–42)
MCH RBC QN AUTO: 30 PG (ref 26–35)
MCHC RBC AUTO-ENTMCNC: 32.4 % (ref 32–34.5)
MCV RBC AUTO: 92.6 FL (ref 80–99.9)
MONOCYTES ABSOLUTE: 0.49 E9/L (ref 0.1–0.95)
MONOCYTES RELATIVE PERCENT: 5.8 % (ref 2–12)
NEUTROPHILS ABSOLUTE: 6.42 E9/L (ref 1.8–7.3)
NEUTROPHILS RELATIVE PERCENT: 75.7 % (ref 43–80)
PDW BLD-RTO: 14.8 FL (ref 11.5–15)
PLATELET # BLD: 214 E9/L (ref 130–450)
PMV BLD AUTO: 10.1 FL (ref 7–12)
POTASSIUM SERPL-SCNC: 5.4 MMOL/L (ref 3.5–5)
PROSTATE SPECIFIC ANTIGEN: 0.68 NG/ML (ref 0–4)
RBC # BLD: 6.61 E12/L (ref 3.8–5.8)
SODIUM BLD-SCNC: 138 MMOL/L (ref 132–146)
TOTAL PROTEIN: 7.6 G/DL (ref 6.4–8.3)
TRIGL SERPL-MCNC: 91 MG/DL (ref 0–149)
VLDLC SERPL CALC-MCNC: 18 MG/DL
WBC # BLD: 8.5 E9/L (ref 4.5–11.5)

## 2022-01-11 PROCEDURE — 36415 COLL VENOUS BLD VENIPUNCTURE: CPT

## 2022-01-11 PROCEDURE — 80061 LIPID PANEL: CPT

## 2022-01-11 PROCEDURE — 80053 COMPREHEN METABOLIC PANEL: CPT

## 2022-01-11 PROCEDURE — 85025 COMPLETE CBC W/AUTO DIFF WBC: CPT

## 2022-01-11 PROCEDURE — G0103 PSA SCREENING: HCPCS

## 2022-01-12 ENCOUNTER — HOSPITAL ENCOUNTER (OUTPATIENT)
Age: 72
Setting detail: SPECIMEN
Discharge: HOME | End: 2022-01-12

## 2022-01-12 DIAGNOSIS — R19.7 DIARRHEA, UNSPECIFIED TYPE: ICD-10-CM

## 2022-01-13 ENCOUNTER — OFFICE VISIT (OUTPATIENT)
Dept: PRIMARY CARE CLINIC | Age: 72
End: 2022-01-13
Payer: MEDICARE

## 2022-01-13 VITALS
TEMPERATURE: 97.6 F | RESPIRATION RATE: 18 BRPM | HEIGHT: 73 IN | HEART RATE: 97 BPM | DIASTOLIC BLOOD PRESSURE: 70 MMHG | SYSTOLIC BLOOD PRESSURE: 120 MMHG | BODY MASS INDEX: 24.39 KG/M2 | OXYGEN SATURATION: 74 % | WEIGHT: 184 LBS

## 2022-01-13 DIAGNOSIS — D75.1 ERYTHROCYTOSIS DUE TO HYPOXEMIA: ICD-10-CM

## 2022-01-13 DIAGNOSIS — J06.9 UPPER RESPIRATORY TRACT INFECTION, UNSPECIFIED TYPE: Primary | ICD-10-CM

## 2022-01-13 DIAGNOSIS — J44.9 CHRONIC OBSTRUCTIVE PULMONARY DISEASE, UNSPECIFIED COPD TYPE (HCC): ICD-10-CM

## 2022-01-13 PROCEDURE — G8420 CALC BMI NORM PARAMETERS: HCPCS | Performed by: INTERNAL MEDICINE

## 2022-01-13 PROCEDURE — G8427 DOCREV CUR MEDS BY ELIG CLIN: HCPCS | Performed by: INTERNAL MEDICINE

## 2022-01-13 PROCEDURE — 3023F SPIROM DOC REV: CPT | Performed by: INTERNAL MEDICINE

## 2022-01-13 PROCEDURE — 1123F ACP DISCUSS/DSCN MKR DOCD: CPT | Performed by: INTERNAL MEDICINE

## 2022-01-13 PROCEDURE — 1036F TOBACCO NON-USER: CPT | Performed by: INTERNAL MEDICINE

## 2022-01-13 PROCEDURE — 4040F PNEUMOC VAC/ADMIN/RCVD: CPT | Performed by: INTERNAL MEDICINE

## 2022-01-13 PROCEDURE — G8484 FLU IMMUNIZE NO ADMIN: HCPCS | Performed by: INTERNAL MEDICINE

## 2022-01-13 PROCEDURE — 3017F COLORECTAL CA SCREEN DOC REV: CPT | Performed by: INTERNAL MEDICINE

## 2022-01-13 PROCEDURE — 99213 OFFICE O/P EST LOW 20 MIN: CPT | Performed by: INTERNAL MEDICINE

## 2022-01-13 NOTE — PROGRESS NOTES
Reji Witt  1/13/22     Chief Complaint   Patient presents with    Cough     congestion ,runny nose x 1 month     Diarrhea     evrytime he eats         No Known Allergies     Current Outpatient Medications   Medication Sig Dispense Refill    doxycycline hyclate (VIBRA-TABS) 100 MG tablet Take 1 tablet by mouth 2 times daily for 7 days 14 tablet 0    Handicap Placard MISC by Does not apply route Patient cannot walk 200 ft without stopping to rest.    Expiration 5 yrs. 1 each 0    etodolac (LODINE) 500 MG tablet Take 500 mg by mouth daily      moxifloxacin (AVELOX) 400 MG tablet Take 400 mg by mouth three times a week Monday, Wednesday, and Friday       Fluticasone-Umeclidin-Vilant (TRELEGY ELLIPTA IN) Inhale 1 puff into the lungs daily      ipratropium-albuterol (DUONEB) 0.5-2.5 (3) MG/3ML SOLN nebulizer solution Inhale 3 mLs into the lungs every 4 hours (while awake) 360 mL     Respiratory Therapy Supplies (FULL KIT NEBULIZER SET) MISC Use as directed with nebulized medication. 1 each 0    oxyCODONE-acetaminophen (PERCOCET) 7.5-325 MG per tablet Take 1 tablet by mouth 2 times daily as needed for Pain. No current facility-administered medications for this visit. HPI: Patient comes in for follow-up visit. He states his upper respiratory infection is now much better with the Z-Benja. He is still having some discolored nasal drainage and cough. He was having diarrhea but that has subsided. He took a stool specimen to the lab to have it checked for C. difficile but it was apparently solid stool and therefore the test was not done. Review of Systems: as per HPI      Physical Exam:    Patient is a 73 y.o. male. Patient appears to be in no distress. Breathing comfortably. Ambulates without assistance. HEENT: Mild nasal congestion with a small amount of yellowish postnasal drainage. Neck supple, no adenopathy or bruits. Heart RR, no MGR. Lungs clear. Abd: normal  Ext: no edema.  Peripheral pulses: normal.  No neurologic deficits noted. Lab Results   Component Value Date    WBC 8.5 01/11/2022    HGB 19.8 (H) 01/11/2022    HCT 61.2 (H) 01/11/2022     01/11/2022    CHOL 175 01/11/2022    TRIG 91 01/11/2022    HDL 74 01/11/2022    ALT 16 01/11/2022    AST 20 01/11/2022    TSH 1.840 04/08/2021    PSA 0.68 01/11/2022    INR 1.1 05/30/2019    LABA1C 5.1 04/08/2021      Lab Results   Component Value Date     01/11/2022    K 5.4 (H) 01/11/2022     01/11/2022    CO2 24 01/11/2022    BUN 13 01/11/2022    CREATININE 0.9 01/11/2022    GLUCOSE 90 01/11/2022    CALCIUM 10.5 (H) 01/11/2022    PROT 7.6 01/11/2022    LABALBU 4.7 01/11/2022    BILITOT 0.7 01/11/2022    ALKPHOS 92 01/11/2022    AST 20 01/11/2022    ALT 16 01/11/2022    LABGLOM >60 01/11/2022    GFRAA >60 01/11/2022            Assessment:    Upper respiratory tract infection, unspecified type  -     doxycycline hyclate (VIBRA-TABS) 100 MG tablet; Take 1 tablet by mouth 2 times daily for 7 days    Erythrocytosis probably due to chronic hypoxemia. Chronic obstructive pulmonary disease, unspecified COPD type ([facility]        Discussion Notes: We will try patient on doxycycline 100 mg twice daily x7 days and he may take Robitussin-DM if needed for cough. He is advised to stay adequately hydrated. He will call if he has any recurring diarrhea. He is due to return next week for routine follow-up visit.

## 2022-01-14 PROBLEM — D75.1 ERYTHROCYTOSIS DUE TO HYPOXEMIA: Status: ACTIVE | Noted: 2022-01-14

## 2022-01-14 RX ORDER — DOXYCYCLINE HYCLATE 100 MG
100 TABLET ORAL 2 TIMES DAILY
Qty: 14 TABLET | Refills: 0 | Status: SHIPPED | OUTPATIENT
Start: 2022-01-14 | End: 2022-01-21

## 2022-01-18 ENCOUNTER — OFFICE VISIT (OUTPATIENT)
Dept: PRIMARY CARE CLINIC | Age: 72
End: 2022-01-18
Payer: MEDICARE

## 2022-01-18 VITALS
TEMPERATURE: 97.7 F | SYSTOLIC BLOOD PRESSURE: 120 MMHG | HEIGHT: 73 IN | RESPIRATION RATE: 18 BRPM | DIASTOLIC BLOOD PRESSURE: 70 MMHG | HEART RATE: 96 BPM | WEIGHT: 186 LBS | BODY MASS INDEX: 24.65 KG/M2 | OXYGEN SATURATION: 72 %

## 2022-01-18 DIAGNOSIS — D75.1 ERYTHROCYTOSIS DUE TO HYPOXEMIA: ICD-10-CM

## 2022-01-18 DIAGNOSIS — G89.29 CHRONIC LOW BACK PAIN, UNSPECIFIED BACK PAIN LATERALITY, UNSPECIFIED WHETHER SCIATICA PRESENT: ICD-10-CM

## 2022-01-18 DIAGNOSIS — A31.0 PULMONARY MYCOBACTERIUM AVIUM COMPLEX (MAC) INFECTION (HCC): ICD-10-CM

## 2022-01-18 DIAGNOSIS — J96.11 CHRONIC RESPIRATORY FAILURE WITH HYPOXIA (HCC): ICD-10-CM

## 2022-01-18 DIAGNOSIS — J44.9 CHRONIC OBSTRUCTIVE PULMONARY DISEASE, UNSPECIFIED COPD TYPE (HCC): Primary | ICD-10-CM

## 2022-01-18 DIAGNOSIS — G89.4 CHRONIC PAIN SYNDROME: ICD-10-CM

## 2022-01-18 DIAGNOSIS — M51.36 LUMBAR DEGENERATIVE DISC DISEASE: ICD-10-CM

## 2022-01-18 DIAGNOSIS — M54.50 CHRONIC LOW BACK PAIN, UNSPECIFIED BACK PAIN LATERALITY, UNSPECIFIED WHETHER SCIATICA PRESENT: ICD-10-CM

## 2022-01-18 PROCEDURE — 3023F SPIROM DOC REV: CPT | Performed by: INTERNAL MEDICINE

## 2022-01-18 PROCEDURE — 3017F COLORECTAL CA SCREEN DOC REV: CPT | Performed by: INTERNAL MEDICINE

## 2022-01-18 PROCEDURE — G8420 CALC BMI NORM PARAMETERS: HCPCS | Performed by: INTERNAL MEDICINE

## 2022-01-18 PROCEDURE — 99215 OFFICE O/P EST HI 40 MIN: CPT | Performed by: INTERNAL MEDICINE

## 2022-01-18 PROCEDURE — 1036F TOBACCO NON-USER: CPT | Performed by: INTERNAL MEDICINE

## 2022-01-18 PROCEDURE — G8427 DOCREV CUR MEDS BY ELIG CLIN: HCPCS | Performed by: INTERNAL MEDICINE

## 2022-01-18 PROCEDURE — 1123F ACP DISCUSS/DSCN MKR DOCD: CPT | Performed by: INTERNAL MEDICINE

## 2022-01-18 PROCEDURE — G8484 FLU IMMUNIZE NO ADMIN: HCPCS | Performed by: INTERNAL MEDICINE

## 2022-01-18 PROCEDURE — 4040F PNEUMOC VAC/ADMIN/RCVD: CPT | Performed by: INTERNAL MEDICINE

## 2022-01-18 NOTE — PROGRESS NOTES
Anayeli Whitmore  1/18/22     Chief Complaint   Patient presents with    COPD     physical        No Known Allergies     Current Outpatient Medications   Medication Sig Dispense Refill    doxycycline hyclate (VIBRA-TABS) 100 MG tablet Take 1 tablet by mouth 2 times daily for 7 days 14 tablet 0    Handicap Placard MISC by Does not apply route Patient cannot walk 200 ft without stopping to rest.    Expiration 5 yrs. 1 each 0    etodolac (LODINE) 500 MG tablet Take 500 mg by mouth daily      moxifloxacin (AVELOX) 400 MG tablet Take 400 mg by mouth three times a week Monday, Wednesday, and Friday       Fluticasone-Umeclidin-Vilant (TRELEGY ELLIPTA IN) Inhale 1 puff into the lungs daily      ipratropium-albuterol (DUONEB) 0.5-2.5 (3) MG/3ML SOLN nebulizer solution Inhale 3 mLs into the lungs every 4 hours (while awake) 360 mL     Respiratory Therapy Supplies (FULL KIT NEBULIZER SET) MISC Use as directed with nebulized medication. 1 each 0    oxyCODONE-acetaminophen (PERCOCET) 7.5-325 MG per tablet Take 1 tablet by mouth 2 times daily as needed for Pain. No current facility-administered medications for this visit. HPI: Patient comes in for his annual physical.  He is now 73years of age. He continues to have problems with severe COPD with chronic hypoxic respiratory failure and uses supplemental oxygen at night. He also remains on treatment for his chronic pulmonary Mycobacterium AVM complex infection. He follows up with his pulmonologist on a regular basis. He also follows up with pain management for his chronic pain syndrome due to chronic low back pain associated with lumbar degenerative disc disease. He has failed numerous epidural nerve blocks and surgery and now remains on pain management with Percocet 7.5/325 twice daily and Lodine 500 mg twice daily. He remains on his other usual meds and inhalers as listed on his med list, which was reviewed with him.   His endurance is significantly limited due to his chronic respiratory failure. Review of Systems    General:   no weight change, no malaise, no fatigue, no change in appetite, no sleep disturbance, no fever/chills, no night sweats,  Skin:                no abnormal pigmentation, no rash, no scaling, no itching, no masses, no hair or nail changes  Eyes:               no blurring, no diplopia, no eye pain, no glaucoma, no cataracts  ENT:                 no hearing loss, no tinnitus, no vertigo, no nosebleed, no nasal congestion, no rhinorrhea, no sore throat, no jaw pain, no hoarseness,  no bleeding    Neck:     no node tenderness , not rigid, no masses   Respiratory:           no cough, no sputum, no coughing blood, no pleuritic , no chest pain, no dyspnea,  no wheezing  Cardiovascular:         no angina, no chest pain  No syncope, no pedal edema , no orthopnea, no PND, no palpitations, no claudication  Gastrointestinal  no nausea, no vomiting, no heartburn, no diarrhea, no constipation, no bloating, no abdominal pain, no rectal pain, no bleeding no hemorrhoids, no hernia.              Genitourinary:            no urinary urgency, no frequency, no dysuria, no nocturia, no hesitancy, no  incontinence, no bleeding, no stones  Musculoskeletal:        no arthritis, no  arthralgia, no myalgia, no  weakness,  no morning stiffness, no joint swelling   Neurologic:                 no paralysis, no paresis, no  paresthesia, no seizures, no tremors, no headaches, no tumors , no stroke, no speech issues,  No incoordination, no head trauma, no memory loss/concentration  Hematologic:              no anemia, no abnormal bleeding / bruising, no fever, no chills, no night sweats, no wollen glands, no unexplained weight loss  Endocrine:        no heat or cold intolerance and no polyphagia, polydipsia,  or polyuria  Psych:   no depression no anxiety, no suicidal or homicidal thoughts, no irritability, no decreased energy, no trouble falling asleep, no early awakening , no trouble concentrating                     Physical Exam:  Patient is an 73 y.o. male     Constitutional:  General Appearance: Well-appearing. Level of Distress: NAD. Ambulation: ambulating normally    Psychiatric:  Insight: good judgment: Mental status: normal mood and affect. Orientation: oriented to time place and person. Memory: recent memory normal and remote memory normal.     Head: normocephalic and atraumatic. Eyes:  Lids and Conjunctiva: Non-injected and no pallor. Pupils: PERRLA, Corneas: grossly intact. EOMI, Lens: clear. Sclerae: non-icteric. Vision: peripheral vision grossly intact and acuity grossly intact. ENMT:  Ears: no lesions on external ear. TMs clear and TM mobility normal.  Hearing: no hearing loss. Nose: No lesions on external nose, septal deviation sinus tenderness or nasal discharge and nares patent and nasal passages clear. Lips, Teeth and Gums:  no mouth or lip ulcers or bleeding gums and normal dentition. Oropharynx: no erythema or exudates and moist mucous membranes and tonsils not enlarged. Neck:  Neck supple, FROM, trachea midline, and no masses. Lymph nodes: no cervical LAD, supraclavicular LAD, axillary LAD, or inguinal LAD. Thyroid: non-tender and no enlargement. Lungs:  Respiratory effort, no dyspnea. Auscultation: no rales/crackles or rhonchi and breath sounds normal, good air movement and CTA except as noted. Cardiovascular: Apical impulse:not displaced. Heart: Auscultation: normal S1 and S2, no murmurs, rubs or gallops; and RRR. Neck vessels: no carotid bruits. Pulses including femoral/pedal: normal throughout. Abdomen: Bowel sounds: normal.  Inspection and Palpation: no tenderness, guarding, masses, rebound tenderness or CVA tenderness and soft and non-distended. Liver: non-tender and no hepatomegaly. Spleen: non-tender and no splenomegaly. Hernia: none palpable.     Musculoskeletal: Motor Strength and Tone: normal tone and motor strength. Joints, Bones and Muscles: no malalignment, tenderness or bony abnormalities and normal movement of all extremities. Extremities: no cyanosis, edema, varicosities, or palpable cord. Neurologic:  Gait and Station: normal gait and station. Cranial nerves:grossly intact. Sensation:grossly intact and monofilament test intact. Reflexes: DTRs 2+ bilaterally throughout. Coordination and Cerebellum: finger to nose intact and no tremor. Skin: Inspection and palpation: no rash, lesions, ulcer, induration, nodules, jaundice or abnormal nevi and good turgor. Nails: normal.     Back:  Thoracolumbar Appearance: normal curvature.            Lab Results   Component Value Date    WBC 8.5 01/11/2022    HGB 19.8 (H) 01/11/2022    HCT 61.2 (H) 01/11/2022     01/11/2022    CHOL 175 01/11/2022    TRIG 91 01/11/2022    HDL 74 01/11/2022    ALT 16 01/11/2022    AST 20 01/11/2022    TSH 1.840 04/08/2021    PSA 0.68 01/11/2022    INR 1.1 05/30/2019    LABA1C 5.1 04/08/2021      Lab Results   Component Value Date     01/11/2022    K 5.4 (H) 01/11/2022     01/11/2022    CO2 24 01/11/2022    BUN 13 01/11/2022    CREATININE 0.9 01/11/2022    GLUCOSE 90 01/11/2022    CALCIUM 10.5 (H) 01/11/2022    PROT 7.6 01/11/2022    LABALBU 4.7 01/11/2022    BILITOT 0.7 01/11/2022    ALKPHOS 92 01/11/2022    AST 20 01/11/2022    ALT 16 01/11/2022    LABGLOM >60 01/11/2022    GFRAA >60 01/11/2022            Assessment:    Chronic obstructive pulmonary disease, unspecified COPD type (HCC)    Pulmonary Mycobacterium avium complex (MAC) infection ([facility] [address]    Chronic respiratory failure with hypoxia (HCC)    Erythrocytosis due to hypoxemia    Chronic pain syndrome    Lumbar degenerative disc disease    Chronic low back pain, unspecified back pain laterality, unspecified whether sciatica present          Discussion Notes: He will continue all of his usual meds and supplements the same as listed on his med list.  He will follow-up with his pulmonologist and pain management specialist as per their instructions. Turn as needed or in 3 months for routine follow-up visit.

## 2022-01-19 PROBLEM — M51.36 LUMBAR DEGENERATIVE DISC DISEASE: Status: ACTIVE | Noted: 2022-01-19

## 2022-01-19 PROBLEM — M51.369 LUMBAR DEGENERATIVE DISC DISEASE: Status: ACTIVE | Noted: 2022-01-19

## 2022-01-19 PROBLEM — G89.4 CHRONIC PAIN SYNDROME: Status: ACTIVE | Noted: 2022-01-19

## 2022-01-19 PROBLEM — M54.50 CHRONIC LOW BACK PAIN: Status: ACTIVE | Noted: 2022-01-19

## 2022-01-19 PROBLEM — G89.29 CHRONIC LOW BACK PAIN: Status: ACTIVE | Noted: 2022-01-19

## 2022-02-01 ENCOUNTER — HOSPITAL ENCOUNTER (INPATIENT)
Age: 72
LOS: 6 days | Discharge: HOME | DRG: 177 | End: 2022-02-07
Attending: EMERGENCY MEDICINE | Admitting: INTERNAL MEDICINE
Payer: MEDICARE

## 2022-02-01 ENCOUNTER — APPOINTMENT (OUTPATIENT)
Dept: GENERAL RADIOLOGY | Age: 72
DRG: 177 | End: 2022-02-01
Payer: MEDICARE

## 2022-02-01 DIAGNOSIS — R06.00 DYSPNEA, UNSPECIFIED TYPE: ICD-10-CM

## 2022-02-01 DIAGNOSIS — J18.9 PNEUMONIA DUE TO INFECTIOUS ORGANISM, UNSPECIFIED LATERALITY, UNSPECIFIED PART OF LUNG: Primary | ICD-10-CM

## 2022-02-01 DIAGNOSIS — J44.1 COPD EXACERBATION (HCC): ICD-10-CM

## 2022-02-01 PROBLEM — J96.01 ACUTE HYPOXEMIC RESPIRATORY FAILURE (HCC): Status: RESOLVED | Noted: 2021-01-01 | Resolved: 2022-02-01

## 2022-02-01 PROBLEM — J96.01 ACUTE RESPIRATORY FAILURE WITH HYPOXIA (HCC): Status: ACTIVE | Noted: 2022-02-01

## 2022-02-01 LAB
ADENOVIRUS BY PCR: NOT DETECTED
ALBUMIN SERPL-MCNC: 3.9 G/DL (ref 3.5–5.2)
ALP BLD-CCNC: 73 U/L (ref 40–129)
ALT SERPL-CCNC: 12 U/L (ref 0–40)
ANION GAP SERPL CALCULATED.3IONS-SCNC: 12 MMOL/L (ref 7–16)
AST SERPL-CCNC: 19 U/L (ref 0–39)
BASOPHILS ABSOLUTE: 0.03 E9/L (ref 0–0.2)
BASOPHILS RELATIVE PERCENT: 0.5 % (ref 0–2)
BILIRUB SERPL-MCNC: 0.7 MG/DL (ref 0–1.2)
BILIRUBIN URINE: ABNORMAL
BLOOD, URINE: NEGATIVE
BORDETELLA PARAPERTUSSIS BY PCR: NOT DETECTED
BORDETELLA PERTUSSIS BY PCR: NOT DETECTED
BUN BLDV-MCNC: 9 MG/DL (ref 6–23)
C-REACTIVE PROTEIN: 1.1 MG/DL (ref 0–0.4)
CALCIUM SERPL-MCNC: 9.8 MG/DL (ref 8.6–10.2)
CHLAMYDOPHILIA PNEUMONIAE BY PCR: NOT DETECTED
CHLORIDE BLD-SCNC: 103 MMOL/L (ref 98–107)
CLARITY: CLEAR
CO2: 21 MMOL/L (ref 22–29)
COLOR: YELLOW
CORONAVIRUS 229E BY PCR: DETECTED
CORONAVIRUS HKU1 BY PCR: NOT DETECTED
CORONAVIRUS NL63 BY PCR: NOT DETECTED
CORONAVIRUS OC43 BY PCR: NOT DETECTED
CREAT SERPL-MCNC: 0.8 MG/DL (ref 0.7–1.2)
D DIMER: 448 NG/ML DDU
EKG ATRIAL RATE: 82 BPM
EKG P AXIS: 58 DEGREES
EKG P-R INTERVAL: 172 MS
EKG Q-T INTERVAL: 420 MS
EKG QRS DURATION: 114 MS
EKG QTC CALCULATION (BAZETT): 490 MS
EKG R AXIS: 17 DEGREES
EKG T AXIS: 10 DEGREES
EKG VENTRICULAR RATE: 82 BPM
EOSINOPHILS ABSOLUTE: 0.03 E9/L (ref 0.05–0.5)
EOSINOPHILS RELATIVE PERCENT: 0.5 % (ref 0–6)
FOLATE: 12.7 NG/ML (ref 4.8–24.2)
GFR AFRICAN AMERICAN: >60
GFR NON-AFRICAN AMERICAN: >60 ML/MIN/1.73
GLUCOSE BLD-MCNC: 91 MG/DL (ref 74–99)
GLUCOSE URINE: NEGATIVE MG/DL
HBA1C MFR BLD: 5.4 % (ref 4–5.6)
HCT VFR BLD CALC: 54.3 % (ref 37–54)
HEMOGLOBIN: 18.2 G/DL (ref 12.5–16.5)
HUMAN METAPNEUMOVIRUS BY PCR: NOT DETECTED
HUMAN RHINOVIRUS/ENTEROVIRUS BY PCR: NOT DETECTED
IMMATURE GRANULOCYTES #: 0.02 E9/L
IMMATURE GRANULOCYTES %: 0.3 % (ref 0–5)
INFLUENZA A BY PCR: NOT DETECTED
INFLUENZA B BY PCR: NOT DETECTED
KETONES, URINE: 15 MG/DL
LACTIC ACID, SEPSIS: 1.1 MMOL/L (ref 0.5–1.9)
LACTIC ACID, SEPSIS: 1.6 MMOL/L (ref 0.5–1.9)
LEUKOCYTE ESTERASE, URINE: NEGATIVE
LYMPHOCYTES ABSOLUTE: 0.68 E9/L (ref 1.5–4)
LYMPHOCYTES RELATIVE PERCENT: 11.7 % (ref 20–42)
MCH RBC QN AUTO: 30.4 PG (ref 26–35)
MCHC RBC AUTO-ENTMCNC: 33.5 % (ref 32–34.5)
MCV RBC AUTO: 90.7 FL (ref 80–99.9)
MONOCYTES ABSOLUTE: 0.54 E9/L (ref 0.1–0.95)
MONOCYTES RELATIVE PERCENT: 9.3 % (ref 2–12)
MYCOPLASMA PNEUMONIAE BY PCR: NOT DETECTED
NEUTROPHILS ABSOLUTE: 4.53 E9/L (ref 1.8–7.3)
NEUTROPHILS RELATIVE PERCENT: 77.7 % (ref 43–80)
NITRITE, URINE: NEGATIVE
PARAINFLUENZA VIRUS 1 BY PCR: NOT DETECTED
PARAINFLUENZA VIRUS 2 BY PCR: NOT DETECTED
PARAINFLUENZA VIRUS 3 BY PCR: NOT DETECTED
PARAINFLUENZA VIRUS 4 BY PCR: NOT DETECTED
PDW BLD-RTO: 14.6 FL (ref 11.5–15)
PH UA: 5.5 (ref 5–9)
PLATELET # BLD: 149 E9/L (ref 130–450)
PMV BLD AUTO: 10.2 FL (ref 7–12)
POTASSIUM REFLEX MAGNESIUM: 4 MMOL/L (ref 3.5–5)
PROCALCITONIN: 0.11 NG/ML (ref 0–0.08)
PROTEIN UA: NEGATIVE MG/DL
RBC # BLD: 5.99 E12/L (ref 3.8–5.8)
RESPIRATORY SYNCYTIAL VIRUS BY PCR: NOT DETECTED
SARS-COV-2, NAAT: NOT DETECTED
SARS-COV-2, PCR: NOT DETECTED
SEDIMENTATION RATE, ERYTHROCYTE: 2 MM/HR (ref 0–15)
SODIUM BLD-SCNC: 136 MMOL/L (ref 132–146)
SPECIFIC GRAVITY UA: 1.02 (ref 1–1.03)
TOTAL PROTEIN: 6.6 G/DL (ref 6.4–8.3)
TROPONIN, HIGH SENSITIVITY: 10 NG/L (ref 0–11)
UROBILINOGEN, URINE: 0.2 E.U./DL
VITAMIN B-12: 347 PG/ML (ref 211–946)
WBC # BLD: 5.8 E9/L (ref 4.5–11.5)

## 2022-02-01 PROCEDURE — 84145 PROCALCITONIN (PCT): CPT

## 2022-02-01 PROCEDURE — 83036 HEMOGLOBIN GLYCOSYLATED A1C: CPT

## 2022-02-01 PROCEDURE — 81003 URINALYSIS AUTO W/O SCOPE: CPT

## 2022-02-01 PROCEDURE — 83605 ASSAY OF LACTIC ACID: CPT

## 2022-02-01 PROCEDURE — 96374 THER/PROPH/DIAG INJ IV PUSH: CPT

## 2022-02-01 PROCEDURE — 6360000002 HC RX W HCPCS: Performed by: INTERNAL MEDICINE

## 2022-02-01 PROCEDURE — 6370000000 HC RX 637 (ALT 250 FOR IP): Performed by: INTERNAL MEDICINE

## 2022-02-01 PROCEDURE — 87040 BLOOD CULTURE FOR BACTERIA: CPT

## 2022-02-01 PROCEDURE — 6370000000 HC RX 637 (ALT 250 FOR IP): Performed by: STUDENT IN AN ORGANIZED HEALTH CARE EDUCATION/TRAINING PROGRAM

## 2022-02-01 PROCEDURE — 0202U NFCT DS 22 TRGT SARS-COV-2: CPT

## 2022-02-01 PROCEDURE — 71045 X-RAY EXAM CHEST 1 VIEW: CPT

## 2022-02-01 PROCEDURE — 99284 EMERGENCY DEPT VISIT MOD MDM: CPT

## 2022-02-01 PROCEDURE — 94664 DEMO&/EVAL PT USE INHALER: CPT

## 2022-02-01 PROCEDURE — 86140 C-REACTIVE PROTEIN: CPT

## 2022-02-01 PROCEDURE — 85651 RBC SED RATE NONAUTOMATED: CPT

## 2022-02-01 PROCEDURE — 2700000000 HC OXYGEN THERAPY PER DAY

## 2022-02-01 PROCEDURE — 85025 COMPLETE CBC W/AUTO DIFF WBC: CPT

## 2022-02-01 PROCEDURE — 87635 SARS-COV-2 COVID-19 AMP PRB: CPT

## 2022-02-01 PROCEDURE — 2580000003 HC RX 258: Performed by: INTERNAL MEDICINE

## 2022-02-01 PROCEDURE — 2580000003 HC RX 258: Performed by: STUDENT IN AN ORGANIZED HEALTH CARE EDUCATION/TRAINING PROGRAM

## 2022-02-01 PROCEDURE — 94640 AIRWAY INHALATION TREATMENT: CPT

## 2022-02-01 PROCEDURE — 80053 COMPREHEN METABOLIC PANEL: CPT

## 2022-02-01 PROCEDURE — 2500000003 HC RX 250 WO HCPCS: Performed by: STUDENT IN AN ORGANIZED HEALTH CARE EDUCATION/TRAINING PROGRAM

## 2022-02-01 PROCEDURE — 36415 COLL VENOUS BLD VENIPUNCTURE: CPT

## 2022-02-01 PROCEDURE — 2060000000 HC ICU INTERMEDIATE R&B

## 2022-02-01 PROCEDURE — 82746 ASSAY OF FOLIC ACID SERUM: CPT

## 2022-02-01 PROCEDURE — 84484 ASSAY OF TROPONIN QUANT: CPT

## 2022-02-01 PROCEDURE — 87449 NOS EACH ORGANISM AG IA: CPT

## 2022-02-01 PROCEDURE — 85378 FIBRIN DEGRADE SEMIQUANT: CPT

## 2022-02-01 PROCEDURE — 6360000002 HC RX W HCPCS: Performed by: STUDENT IN AN ORGANIZED HEALTH CARE EDUCATION/TRAINING PROGRAM

## 2022-02-01 PROCEDURE — 87150 DNA/RNA AMPLIFIED PROBE: CPT

## 2022-02-01 PROCEDURE — 93005 ELECTROCARDIOGRAM TRACING: CPT | Performed by: STUDENT IN AN ORGANIZED HEALTH CARE EDUCATION/TRAINING PROGRAM

## 2022-02-01 PROCEDURE — 82607 VITAMIN B-12: CPT

## 2022-02-01 RX ORDER — OXYCODONE HYDROCHLORIDE 5 MG/1
2.5 TABLET ORAL 2 TIMES DAILY PRN
Status: DISCONTINUED | OUTPATIENT
Start: 2022-02-01 | End: 2022-02-07 | Stop reason: HOSPADM

## 2022-02-01 RX ORDER — ARFORMOTEROL TARTRATE 15 UG/2ML
15 SOLUTION RESPIRATORY (INHALATION) 2 TIMES DAILY
Status: DISCONTINUED | OUTPATIENT
Start: 2022-02-01 | End: 2022-02-07 | Stop reason: HOSPADM

## 2022-02-01 RX ORDER — SODIUM CHLORIDE 9 MG/ML
INJECTION, SOLUTION INTRAVENOUS CONTINUOUS
Status: DISCONTINUED | OUTPATIENT
Start: 2022-02-01 | End: 2022-02-05

## 2022-02-01 RX ORDER — METHYLPREDNISOLONE SODIUM SUCCINATE 125 MG/2ML
125 INJECTION, POWDER, LYOPHILIZED, FOR SOLUTION INTRAMUSCULAR; INTRAVENOUS DAILY
Status: DISCONTINUED | OUTPATIENT
Start: 2022-02-01 | End: 2022-02-01

## 2022-02-01 RX ORDER — ONDANSETRON 2 MG/ML
4 INJECTION INTRAMUSCULAR; INTRAVENOUS EVERY 6 HOURS PRN
Status: DISCONTINUED | OUTPATIENT
Start: 2022-02-01 | End: 2022-02-07 | Stop reason: HOSPADM

## 2022-02-01 RX ORDER — PANTOPRAZOLE SODIUM 40 MG/1
40 TABLET, DELAYED RELEASE ORAL
Status: DISCONTINUED | OUTPATIENT
Start: 2022-02-02 | End: 2022-02-07 | Stop reason: HOSPADM

## 2022-02-01 RX ORDER — BUDESONIDE 0.25 MG/2ML
250 INHALANT ORAL 2 TIMES DAILY
Status: DISCONTINUED | OUTPATIENT
Start: 2022-02-01 | End: 2022-02-07 | Stop reason: HOSPADM

## 2022-02-01 RX ORDER — OXYCODONE HYDROCHLORIDE AND ACETAMINOPHEN 5; 325 MG/1; MG/1
1 TABLET ORAL 2 TIMES DAILY PRN
Status: DISCONTINUED | OUTPATIENT
Start: 2022-02-01 | End: 2022-02-07 | Stop reason: HOSPADM

## 2022-02-01 RX ORDER — IPRATROPIUM BROMIDE AND ALBUTEROL SULFATE 2.5; .5 MG/3ML; MG/3ML
1 SOLUTION RESPIRATORY (INHALATION)
Status: DISCONTINUED | OUTPATIENT
Start: 2022-02-01 | End: 2022-02-07 | Stop reason: HOSPADM

## 2022-02-01 RX ORDER — METHYLPREDNISOLONE SODIUM SUCCINATE 125 MG/2ML
60 INJECTION, POWDER, LYOPHILIZED, FOR SOLUTION INTRAMUSCULAR; INTRAVENOUS EVERY 6 HOURS
Status: DISCONTINUED | OUTPATIENT
Start: 2022-02-01 | End: 2022-02-03

## 2022-02-01 RX ORDER — IPRATROPIUM BROMIDE AND ALBUTEROL SULFATE 2.5; .5 MG/3ML; MG/3ML
3 SOLUTION RESPIRATORY (INHALATION) ONCE
Status: COMPLETED | OUTPATIENT
Start: 2022-02-01 | End: 2022-02-01

## 2022-02-01 RX ORDER — ACETAMINOPHEN 325 MG/1
650 TABLET ORAL EVERY 4 HOURS PRN
Status: DISCONTINUED | OUTPATIENT
Start: 2022-02-01 | End: 2022-02-07 | Stop reason: HOSPADM

## 2022-02-01 RX ORDER — OXYCODONE AND ACETAMINOPHEN 7.5; 325 MG/1; MG/1
1 TABLET ORAL 2 TIMES DAILY PRN
Status: DISCONTINUED | OUTPATIENT
Start: 2022-02-01 | End: 2022-02-01 | Stop reason: RX

## 2022-02-01 RX ORDER — METHYLPREDNISOLONE SODIUM SUCCINATE 125 MG/2ML
60 INJECTION, POWDER, LYOPHILIZED, FOR SOLUTION INTRAMUSCULAR; INTRAVENOUS EVERY 8 HOURS
Status: DISCONTINUED | OUTPATIENT
Start: 2022-02-01 | End: 2022-02-01

## 2022-02-01 RX ORDER — BENZONATATE 100 MG/1
100 CAPSULE ORAL 3 TIMES DAILY PRN
Status: DISCONTINUED | OUTPATIENT
Start: 2022-02-01 | End: 2022-02-07 | Stop reason: HOSPADM

## 2022-02-01 RX ORDER — IBUPROFEN 400 MG/1
400 TABLET ORAL
Status: DISCONTINUED | OUTPATIENT
Start: 2022-02-01 | End: 2022-02-07 | Stop reason: HOSPADM

## 2022-02-01 RX ORDER — AZITHROMYCIN 250 MG/1
500 TABLET, FILM COATED ORAL DAILY
Status: ON HOLD | COMMUNITY
End: 2022-02-07 | Stop reason: HOSPADM

## 2022-02-01 RX ADMIN — BUDESONIDE 250 MCG: 0.25 SUSPENSION RESPIRATORY (INHALATION) at 20:57

## 2022-02-01 RX ADMIN — DOXYCYCLINE 100 MG: 100 INJECTION, POWDER, LYOPHILIZED, FOR SOLUTION INTRAVENOUS at 14:49

## 2022-02-01 RX ADMIN — IPRATROPIUM BROMIDE AND ALBUTEROL SULFATE 1 AMPULE: 2.5; .5 SOLUTION RESPIRATORY (INHALATION) at 20:57

## 2022-02-01 RX ADMIN — IBUPROFEN 400 MG: 400 TABLET, FILM COATED ORAL at 17:12

## 2022-02-01 RX ADMIN — ARFORMOTEROL TARTRATE 15 MCG: 15 SOLUTION RESPIRATORY (INHALATION) at 20:57

## 2022-02-01 RX ADMIN — SODIUM CHLORIDE: 9 INJECTION, SOLUTION INTRAVENOUS at 17:13

## 2022-02-01 RX ADMIN — METHYLPREDNISOLONE SODIUM SUCCINATE 60 MG: 125 INJECTION, POWDER, LYOPHILIZED, FOR SOLUTION INTRAMUSCULAR; INTRAVENOUS at 20:16

## 2022-02-01 RX ADMIN — IPRATROPIUM BROMIDE AND ALBUTEROL SULFATE 3 AMPULE: .5; 3 SOLUTION RESPIRATORY (INHALATION) at 12:26

## 2022-02-01 RX ADMIN — CEFEPIME HYDROCHLORIDE 2000 MG: 2 INJECTION, POWDER, FOR SOLUTION INTRAVENOUS at 14:18

## 2022-02-01 RX ADMIN — METHYLPREDNISOLONE SODIUM SUCCINATE 125 MG: 125 INJECTION, POWDER, FOR SOLUTION INTRAMUSCULAR; INTRAVENOUS at 12:36

## 2022-02-01 RX ADMIN — ENOXAPARIN SODIUM 40 MG: 100 INJECTION SUBCUTANEOUS at 17:13

## 2022-02-01 ASSESSMENT — PAIN SCALES - GENERAL
PAINLEVEL_OUTOF10: 0
PAINLEVEL_OUTOF10: 3
PAINLEVEL_OUTOF10: 0

## 2022-02-01 ASSESSMENT — ENCOUNTER SYMPTOMS
EYE DISCHARGE: 0
SHORTNESS OF BREATH: 1
BACK PAIN: 0
VOMITING: 0
NAUSEA: 0
ABDOMINAL DISTENTION: 0
DIARRHEA: 0
ANAL BLEEDING: 0
SINUS PRESSURE: 0
CONSTIPATION: 0
ABDOMINAL PAIN: 0
RHINORRHEA: 0
COUGH: 1
SINUS PAIN: 0
CHEST TIGHTNESS: 0

## 2022-02-01 NOTE — ED PROVIDER NOTES
HPI     Patient is a 73 y.o. male presents with a chief complaint of shortness of breath  This has been occurring for a few days. Patient states that it gets better with nothing. Patient states that it gets worse with nothing. Patient states that it is moderate in severity. Patient states it was gradual in onset. Patient presents with chief complaint shortness of breath. Patient has a history of MAC infection in the lungs. Patient follows with pulmonology for COPD and is on 3 L at home. Patient states over the past few weeks he has been more more short of breath. Patient is denying any chest pain but states it feels tight. Patient denies any fevers or chills. Patient denies any nausea or vomiting. Patient states this feels like when he gets pneumonia. Patient has been on rifampin, ethambutol, and moxifloxacin. Review of Systems   Constitutional: Negative for activity change, appetite change, fatigue and fever. HENT: Negative for congestion, rhinorrhea, sinus pressure and sinus pain. Eyes: Negative for discharge. Respiratory: Positive for cough and shortness of breath. Negative for chest tightness. Cardiovascular: Negative for chest pain and palpitations. Gastrointestinal: Negative for abdominal distention, abdominal pain, anal bleeding, constipation, diarrhea, nausea and vomiting. Endocrine: Negative for polydipsia and polyuria. Genitourinary: Negative for decreased urine volume, difficulty urinating, enuresis, flank pain, frequency and urgency. Musculoskeletal: Negative for arthralgias, back pain and neck stiffness. Skin: Negative for rash and wound. Neurological: Negative for dizziness, weakness, light-headedness and headaches. Psychiatric/Behavioral: Negative for agitation, behavioral problems and confusion. Physical Exam  Vitals and nursing note reviewed. Constitutional:       Appearance: He is well-developed. HENT:      Head: Normocephalic and atraumatic. Eyes:      Conjunctiva/sclera: Conjunctivae normal.   Cardiovascular:      Rate and Rhythm: Normal rate and regular rhythm. Heart sounds: Normal heart sounds. No murmur heard. Pulmonary:      Effort: Pulmonary effort is normal. No respiratory distress. Breath sounds: Wheezing present. No rales. Comments: Significant wheezing  Abdominal:      General: Bowel sounds are normal.      Palpations: Abdomen is soft. Tenderness: There is no abdominal tenderness. There is no guarding or rebound. Musculoskeletal:         General: No tenderness or deformity. Cervical back: Normal range of motion and neck supple. Skin:     General: Skin is warm and dry. Neurological:      Mental Status: He is alert and oriented to person, place, and time. Cranial Nerves: No cranial nerve deficit. Coordination: Coordination normal.          Procedures     [facility]     ED Course as of 02/01/22 1701   Tue Feb 01, 2022   1233 EKG read interpreted by me. Rate 82 bpm.  Normal axis. Normal MN interval.  Low voltage. Poor R wave progression. No ST elevations or depressions. Compared to a prior EKG with no acute changes. Prolonged QT at 490. [JM]   1315 Patient was reevaluated and stated that he had mild improvement of his symptoms. [JM]   6088 Spoke to internal medicine agreed to admit patient  [JM]      ED Course User Index  [JM] Silvia Huang MD      Patient is a 73 y.o. male presenting with shortness of breath. Patient has extensive pulmonary history as well and has history of MAC. Patient was satting in the 70s on 4 L. Patient will be given duo nebs. Patient had improvement with DuoNeb's. Patient's chest x-ray was concerning for possible pneumonia. Patient was started on cefepime and doxycycline secondary to history of Pseudomonas infection. Patient had improvement of his symptoms.   Given patient's increased oxygen requirements patient will be admitted for COPD exacerbation and possible pneumonia. Discussed case with internal medicine who agreed to admit patient. Discussed case with patient who is agreeable to this plan. Patient was seen and evaluated by myself and my attending Pia Diane DO. Assessment and Plan discussed with attending provider, please see attestation for final plan of care. This note was done using dictation software and there may be some grammatical errors associated with this. Vel Toledo MD     ED Course as of 02/01/22 1701   Tue Feb 01, 2022   1233 EKG read interpreted by me. Rate 82 bpm.  Normal axis. Normal CT interval.  Low voltage. Poor R wave progression. No ST elevations or depressions. Compared to a prior EKG with no acute changes. Prolonged QT at 490. [JM]   1315 Patient was reevaluated and stated that he had mild improvement of his symptoms.  []   2660 Spoke to internal medicine agreed to admit patient  [JM]      ED Course User Index  [JM] Vel Toledo MD       --------------------------------------------- PAST HISTORY ---------------------------------------------  Past Medical History:  has a past medical history of Acute and chronic respiratory failure with hypoxia ([facility] [address] Acute heart failure with preserved ejection fraction ([facility] [address] Acute respiratory disease due to severe acute respiratory syndrome coronavirus 2 (SARS-CoV-2), Acute respiratory failure with hypoxia ([address] Bullous emphysema (HCC), Chronic back pain, Colon cancer screening, COPD (chronic obstructive pulmonary disease) ([address] Cough with hemoptysis, 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 ([address] Emphysema lung ([address] Glucose intolerance, Hilar adenopathy, Hypophosphatemia, Infection due to parainfluenza virus 3, Left upper lobe pneumonia, Pleural effusion on right, Pulmonary emphysema with fibrosis of lung ([address] Pulmonary Mycobacterium avium complex (MAC) infection ([facility] [address] Rhinovirus infection, Right lower lobe pneumonia, S/P lumbar fusion, 0.20 E9/L   Comprehensive Metabolic Panel w/ Reflex to MG   Result Value Ref Range    Sodium 136 132 - 146 mmol/L    Potassium reflex Magnesium 4.0 3.5 - 5.0 mmol/L    Chloride 103 98 - 107 mmol/L    CO2 21 (L) 22 - 29 mmol/L    Anion Gap 12 7 - 16 mmol/L    Glucose 91 74 - 99 mg/dL    BUN 9 6 - 23 mg/dL    CREATININE 0.8 0.7 - 1.2 mg/dL    GFR Non-African American >60 >=60 mL/min/1.73    GFR African American >60     Calcium 9.8 8.6 - 10.2 mg/dL    Total Protein 6.6 6.4 - 8.3 g/dL    Albumin 3.9 3.5 - 5.2 g/dL    Total Bilirubin 0.7 0.0 - 1.2 mg/dL    Alkaline Phosphatase 73 40 - 129 U/L    ALT 12 0 - 40 U/L    AST 19 0 - 39 U/L   Lactate, Sepsis   Result Value Ref Range    Lactic Acid, Sepsis 1.1 0.5 - 1.9 mmol/L   Lactate, Sepsis   Result Value Ref Range    Lactic Acid, Sepsis 1.6 0.5 - 1.9 mmol/L   Troponin   Result Value Ref Range    Troponin, High Sensitivity 10 0 - 11 ng/L   EKG 12 Lead   Result Value Ref Range    Ventricular Rate 82 BPM    Atrial Rate 82 BPM    P-R Interval 172 ms    QRS Duration 114 ms    Q-T Interval 420 ms    QTc Calculation (Bazett) 490 ms    P Axis 58 degrees    R Axis 17 degrees    T Axis 10 degrees       RADIOLOGY:  XR CHEST PORTABLE   Final Result   1. Advanced emphysematous changes. 2. Patchy bibasilar opacities which could represent chronic scarring or   possibly pneumonia   3. Chronic elevation of the right hemidiaphragm with pleuroparenchymal   scarring within the right lung base.                 ------------------------- NURSING NOTES AND VITALS REVIEWED ---------------------------  Date / Time Roomed:  2/1/2022 11:08 AM  ED Bed Assignment:  [id number]    The nursing notes within the ED encounter and vital signs as below have been reviewed.      Patient Vitals for the past 24 hrs:   BP Temp Pulse Resp SpO2 Height Weight   02/01/22 1540 -- -- 84 20 93 % -- --   02/01/22 1432 -- -- -- 18 -- -- --   02/01/22 1400 119/71 -- 86 18 94 % -- --   02/01/22 1107 128/74 98.2 °F (36.8 °C) 98 26 (!) 71 % 6' 1\" (1.854 m) 185 lb (83.9 kg)       Oxygen Saturation Interpretation: Normal    ------------------------------------------ PROGRESS NOTES ------------------------------------------  See ED course for reevaluation    Counseling:  I have spoken with the patient and discussed todays results, in addition to providing specific details for the plan of care and counseling regarding the diagnosis and prognosis. Their questions are answered at this time and they are agreeable with the plan of admission.    --------------------------------- ADDITIONAL PROVIDER NOTES ---------------------------------  Consultations:   Spoke with Dr. Jose Roberto Adames. Discussed case. They will admit the patient. This patient's ED course included: a personal history and physicial examination, re-evaluation prior to disposition, multiple bedside re-evaluations, IV medications, cardiac monitoring and continuous pulse oximetry    This patient has remained hemodynamically stable during their ED course. Diagnosis:  1. COPD exacerbation ([address]    2. Pneumonia due to infectious organism, unspecified laterality, unspecified part of lung    3.  Dyspnea, unspecified type        Disposition:  Patient's disposition: Admit to telemetry  Patient's condition is Stable           Stanford Whyte MD  Resident  02/01/22 3340

## 2022-02-02 PROBLEM — B34.2 CORONAVIRUS INFECTION: Status: ACTIVE | Noted: 2022-02-02

## 2022-02-02 LAB
ALBUMIN SERPL-MCNC: 3.9 G/DL (ref 3.5–5.2)
ALP BLD-CCNC: 63 U/L (ref 40–129)
ALT SERPL-CCNC: 10 U/L (ref 0–40)
ANION GAP SERPL CALCULATED.3IONS-SCNC: 11 MMOL/L (ref 7–16)
AST SERPL-CCNC: 15 U/L (ref 0–39)
BASOPHILS ABSOLUTE: 0 E9/L (ref 0–0.2)
BASOPHILS RELATIVE PERCENT: 0 % (ref 0–2)
BILIRUB SERPL-MCNC: 0.5 MG/DL (ref 0–1.2)
BILIRUBIN DIRECT: <0.2 MG/DL (ref 0–0.3)
BILIRUBIN, INDIRECT: NORMAL MG/DL (ref 0–1)
BUN BLDV-MCNC: 14 MG/DL (ref 6–23)
C-REACTIVE PROTEIN: 1.2 MG/DL (ref 0–0.4)
CALCIUM IONIZED: 1.35 MMOL/L (ref 1.15–1.33)
CALCIUM SERPL-MCNC: 9.7 MG/DL (ref 8.6–10.2)
CHLORIDE BLD-SCNC: 100 MMOL/L (ref 98–107)
CHOLESTEROL, TOTAL: 141 MG/DL (ref 0–199)
CO2: 20 MMOL/L (ref 22–29)
CREAT SERPL-MCNC: 0.8 MG/DL (ref 0.7–1.2)
EOSINOPHILS ABSOLUTE: 0 E9/L (ref 0.05–0.5)
EOSINOPHILS RELATIVE PERCENT: 0 % (ref 0–6)
FERRITIN: 63 NG/ML
GFR AFRICAN AMERICAN: >60
GFR NON-AFRICAN AMERICAN: >60 ML/MIN/1.73
GLUCOSE BLD-MCNC: 158 MG/DL (ref 74–99)
HCT VFR BLD CALC: 51.6 % (ref 37–54)
HDLC SERPL-MCNC: 61 MG/DL
HEMOGLOBIN: 17.3 G/DL (ref 12.5–16.5)
IMMATURE GRANULOCYTES #: 0.02 E9/L
IMMATURE GRANULOCYTES %: 0.4 % (ref 0–5)
IRON SATURATION: 13 % (ref 20–55)
IRON: 45 MCG/DL (ref 59–158)
L. PNEUMOPHILA SEROGP 1 UR AG: NORMAL
LACTIC ACID, SEPSIS: 1.8 MMOL/L (ref 0.5–1.9)
LDL CHOLESTEROL CALCULATED: 72 MG/DL (ref 0–99)
LYMPHOCYTES ABSOLUTE: 0.31 E9/L (ref 1.5–4)
LYMPHOCYTES RELATIVE PERCENT: 6.5 % (ref 20–42)
MAGNESIUM: 1.8 MG/DL (ref 1.6–2.6)
MCH RBC QN AUTO: 30.2 PG (ref 26–35)
MCHC RBC AUTO-ENTMCNC: 33.5 % (ref 32–34.5)
MCV RBC AUTO: 90.1 FL (ref 80–99.9)
MONOCYTES ABSOLUTE: 0.08 E9/L (ref 0.1–0.95)
MONOCYTES RELATIVE PERCENT: 1.7 % (ref 2–12)
NEUTROPHILS ABSOLUTE: 4.36 E9/L (ref 1.8–7.3)
NEUTROPHILS RELATIVE PERCENT: 91.4 % (ref 43–80)
PDW BLD-RTO: 14.6 FL (ref 11.5–15)
PHOSPHORUS: 2.8 MG/DL (ref 2.5–4.5)
PLATELET # BLD: 179 E9/L (ref 130–450)
PMV BLD AUTO: 10.5 FL (ref 7–12)
POTASSIUM SERPL-SCNC: 4.3 MMOL/L (ref 3.5–5)
PRO-BNP: 647 PG/ML (ref 0–125)
RBC # BLD: 5.73 E12/L (ref 3.8–5.8)
RBC # BLD: NORMAL 10*6/UL
SEDIMENTATION RATE, ERYTHROCYTE: 2 MM/HR (ref 0–15)
SODIUM BLD-SCNC: 131 MMOL/L (ref 132–146)
STREP PNEUMONIAE ANTIGEN, URINE: NORMAL
TOTAL IRON BINDING CAPACITY: 339 MCG/DL (ref 250–450)
TOTAL PROTEIN: 6.4 G/DL (ref 6.4–8.3)
TRIGL SERPL-MCNC: 41 MG/DL (ref 0–149)
TSH SERPL DL<=0.05 MIU/L-ACNC: 0.86 UIU/ML (ref 0.27–4.2)
URIC ACID, SERUM: 4.8 MG/DL (ref 3.4–7)
VLDLC SERPL CALC-MCNC: 8 MG/DL
WBC # BLD: 4.8 E9/L (ref 4.5–11.5)

## 2022-02-02 PROCEDURE — 6370000000 HC RX 637 (ALT 250 FOR IP): Performed by: INTERNAL MEDICINE

## 2022-02-02 PROCEDURE — 80061 LIPID PANEL: CPT

## 2022-02-02 PROCEDURE — 80076 HEPATIC FUNCTION PANEL: CPT

## 2022-02-02 PROCEDURE — 83550 IRON BINDING TEST: CPT

## 2022-02-02 PROCEDURE — 6360000002 HC RX W HCPCS: Performed by: INTERNAL MEDICINE

## 2022-02-02 PROCEDURE — 85025 COMPLETE CBC W/AUTO DIFF WBC: CPT

## 2022-02-02 PROCEDURE — 82785 ASSAY OF IGE: CPT

## 2022-02-02 PROCEDURE — 82784 ASSAY IGA/IGD/IGG/IGM EACH: CPT

## 2022-02-02 PROCEDURE — 83540 ASSAY OF IRON: CPT

## 2022-02-02 PROCEDURE — 2700000000 HC OXYGEN THERAPY PER DAY

## 2022-02-02 PROCEDURE — 2500000003 HC RX 250 WO HCPCS: Performed by: INTERNAL MEDICINE

## 2022-02-02 PROCEDURE — 83735 ASSAY OF MAGNESIUM: CPT

## 2022-02-02 PROCEDURE — 83880 ASSAY OF NATRIURETIC PEPTIDE: CPT

## 2022-02-02 PROCEDURE — 85651 RBC SED RATE NONAUTOMATED: CPT

## 2022-02-02 PROCEDURE — 86140 C-REACTIVE PROTEIN: CPT

## 2022-02-02 PROCEDURE — 84100 ASSAY OF PHOSPHORUS: CPT

## 2022-02-02 PROCEDURE — 84443 ASSAY THYROID STIM HORMONE: CPT

## 2022-02-02 PROCEDURE — 82728 ASSAY OF FERRITIN: CPT

## 2022-02-02 PROCEDURE — 83605 ASSAY OF LACTIC ACID: CPT

## 2022-02-02 PROCEDURE — 80048 BASIC METABOLIC PNL TOTAL CA: CPT

## 2022-02-02 PROCEDURE — 94640 AIRWAY INHALATION TREATMENT: CPT

## 2022-02-02 PROCEDURE — 84550 ASSAY OF BLOOD/URIC ACID: CPT

## 2022-02-02 PROCEDURE — 82330 ASSAY OF CALCIUM: CPT

## 2022-02-02 PROCEDURE — 2580000003 HC RX 258: Performed by: INTERNAL MEDICINE

## 2022-02-02 PROCEDURE — 2060000000 HC ICU INTERMEDIATE R&B

## 2022-02-02 PROCEDURE — 86317 IMMUNOASSAY INFECTIOUS AGENT: CPT

## 2022-02-02 PROCEDURE — 36415 COLL VENOUS BLD VENIPUNCTURE: CPT

## 2022-02-02 RX ORDER — SODIUM CHLORIDE 9 MG/ML
INJECTION, SOLUTION INTRAVENOUS EVERY 24 HOURS
Status: DISCONTINUED | OUTPATIENT
Start: 2022-02-02 | End: 2022-02-02

## 2022-02-02 RX ORDER — SODIUM CHLORIDE 9 MG/ML
INJECTION, SOLUTION INTRAVENOUS EVERY 12 HOURS
Status: DISCONTINUED | OUTPATIENT
Start: 2022-02-02 | End: 2022-02-02

## 2022-02-02 RX ORDER — GUAIFENESIN/DEXTROMETHORPHAN 100-10MG/5
5 SYRUP ORAL EVERY 4 HOURS PRN
Status: DISCONTINUED | OUTPATIENT
Start: 2022-02-02 | End: 2022-02-07 | Stop reason: HOSPADM

## 2022-02-02 RX ADMIN — IPRATROPIUM BROMIDE AND ALBUTEROL SULFATE 1 AMPULE: 2.5; .5 SOLUTION RESPIRATORY (INHALATION) at 19:47

## 2022-02-02 RX ADMIN — PANTOPRAZOLE SODIUM 40 MG: 40 TABLET, DELAYED RELEASE ORAL at 05:49

## 2022-02-02 RX ADMIN — BUDESONIDE 250 MCG: 0.25 SUSPENSION RESPIRATORY (INHALATION) at 19:47

## 2022-02-02 RX ADMIN — ARFORMOTEROL TARTRATE 15 MCG: 15 SOLUTION RESPIRATORY (INHALATION) at 19:47

## 2022-02-02 RX ADMIN — IPRATROPIUM BROMIDE AND ALBUTEROL SULFATE 1 AMPULE: 2.5; .5 SOLUTION RESPIRATORY (INHALATION) at 13:00

## 2022-02-02 RX ADMIN — IBUPROFEN 400 MG: 400 TABLET, FILM COATED ORAL at 09:01

## 2022-02-02 RX ADMIN — BENZOCAINE AND MENTHOL 1 LOZENGE: 15; 3.6 LOZENGE ORAL at 12:52

## 2022-02-02 RX ADMIN — METHYLPREDNISOLONE SODIUM SUCCINATE 60 MG: 125 INJECTION, POWDER, LYOPHILIZED, FOR SOLUTION INTRAMUSCULAR; INTRAVENOUS at 09:01

## 2022-02-02 RX ADMIN — SODIUM CHLORIDE: 9 INJECTION, SOLUTION INTRAVENOUS at 14:44

## 2022-02-02 RX ADMIN — ENOXAPARIN SODIUM 40 MG: 100 INJECTION SUBCUTANEOUS at 18:28

## 2022-02-02 RX ADMIN — IBUPROFEN 400 MG: 400 TABLET, FILM COATED ORAL at 13:44

## 2022-02-02 RX ADMIN — IPRATROPIUM BROMIDE AND ALBUTEROL SULFATE 1 AMPULE: 2.5; .5 SOLUTION RESPIRATORY (INHALATION) at 16:25

## 2022-02-02 RX ADMIN — CEFEPIME HYDROCHLORIDE 2000 MG: 2 INJECTION, POWDER, FOR SOLUTION INTRAVENOUS at 12:52

## 2022-02-02 RX ADMIN — BENZONATATE 100 MG: 100 CAPSULE ORAL at 12:53

## 2022-02-02 RX ADMIN — METHYLPREDNISOLONE SODIUM SUCCINATE 60 MG: 125 INJECTION, POWDER, LYOPHILIZED, FOR SOLUTION INTRAMUSCULAR; INTRAVENOUS at 02:48

## 2022-02-02 RX ADMIN — ARFORMOTEROL TARTRATE 15 MCG: 15 SOLUTION RESPIRATORY (INHALATION) at 08:26

## 2022-02-02 RX ADMIN — BUDESONIDE 250 MCG: 0.25 SUSPENSION RESPIRATORY (INHALATION) at 08:26

## 2022-02-02 RX ADMIN — METHYLPREDNISOLONE SODIUM SUCCINATE 60 MG: 125 INJECTION, POWDER, LYOPHILIZED, FOR SOLUTION INTRAMUSCULAR; INTRAVENOUS at 13:45

## 2022-02-02 RX ADMIN — DOXYCYCLINE 100 MG: 100 INJECTION, POWDER, LYOPHILIZED, FOR SOLUTION INTRAVENOUS at 13:39

## 2022-02-02 RX ADMIN — IPRATROPIUM BROMIDE AND ALBUTEROL SULFATE 1 AMPULE: 2.5; .5 SOLUTION RESPIRATORY (INHALATION) at 08:26

## 2022-02-02 RX ADMIN — METHYLPREDNISOLONE SODIUM SUCCINATE 60 MG: 125 INJECTION, POWDER, LYOPHILIZED, FOR SOLUTION INTRAMUSCULAR; INTRAVENOUS at 20:18

## 2022-02-02 ASSESSMENT — PAIN SCALES - GENERAL
PAINLEVEL_OUTOF10: 7
PAINLEVEL_OUTOF10: 0
PAINLEVEL_OUTOF10: 0

## 2022-02-02 NOTE — CARE COORDINATION
Social work / Discharge planning:        Patient is from home with his wife. Independent. Home oxygen is provided by [facility] and baseline is 4-5 liters. PT/OT evaluations ordered. Social work will follow.   Electronically signed by MALVIN Bruner on 2/2/2022 at 9:24 AM

## 2022-02-02 NOTE — CONSULTS
[address] [place] [address] [facility]  DTA  Consultation Note     Admit Date: 2/1/2022 11:08 AM    Reason for Consult:   History of MAC. Known to DAT. For antibiotic therapy. Was due to follow-up 2/5/2022    Attending Physician:  Kd Cash DO    HISTORY OF PRESENT ILLNESS:             The history is obtained from extensive review of available past medical records. The patient is a 73 y.o. male who is previously known to the ID service. The patient has a history of MAC infection of the lung. He completed 2 years of treatment with Ethambutol, Rifampin and Moxifloxacin (this was switched over from Clarithromycin). In spite of this treatment, he never sterilized his respiratory cultures. He was offered Arikayce but he declined due to out-of-pocket expenses. The patient was recently treated for an upper respiratory tract infection with Doxycycline. Presented to the ED at [facility] on 2/1/2022 complaining of cough and shortness of breath for several weeks. He was afebrile her vital signs were unremarkable except for a low pulse oximetry. A respiratory panel was positive for coronavirus OC43. He is vaccinated and boosted against SARS-CoV-2.     Past Medical History:        Diagnosis Date    Acute and chronic respiratory failure with hypoxia ([address] 10/12/2017    Acute heart failure with preserved ejection fraction ([address] 4/8/2021    Acute respiratory disease due to severe acute respiratory syndrome coronavirus 2 (SARS-CoV-2) 01/01/2021    Acute respiratory failure with hypoxia (HCC) 11/12/2018    Bullous emphysema ([address] 11/12/2018    Chronic back pain     Degeneration of umbar intervertebral disc    Colon cancer screening     COPD (chronic obstructive pulmonary disease) ([address]     Coronavirus infection 2/2/2022    Coronavirus 229E    Cough with hemoptysis 04/23/2019    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 ([address] 08/15/2019    First seen by ID; treatment started 9/4/2019    Emphysema lung ([facility]     Glucose intolerance 06/10/2019    Hilar adenopathy 06/10/2019    Hypophosphatemia 06/10/2019    Infection due to parainfluenza virus 3 06/10/2019    Left upper lobe pneumonia 10/12/2017    Pleural effusion on right 10/03/2019    Pulmonary emphysema with fibrosis of lung ([facility] 11/15/2018    Pulmonary Mycobacterium avium complex (MAC) infection ([facility] 07/12/2019    BAL results finally completed this date    Rhinovirus infection 10/16/2020    Right lower lobe pneumonia 11/15/2018    Recurrent 4/23/19    S/P lumbar fusion 06/01/2019    Thoracic ascending aortic aneurysm ([facility] 11/15/2018    Proximal ascending aorta; 3.8 cm; 11/15/18     April 2021. Admitted to [facility] with fever, shortness of breath. Treated with Ciprofloxacin and admitted for pneumonia. Seen by ID. Cefepime was added to Ciprofloxacin. He was on Ethambutol, Rifampin and Moxifloxacin. Moxifloxacin was held. Antibiotics were changed to Meropenem, inhaled Tobramycin and oral Ciprofloxacin. He was discharged to [facility] where he completed IV and inhaled antibiotics. Seen in the office in follow-up. Ethambutol, Rifampin and Moxifloxacin were resumed. He was offered Arikayce but he declined because of out-of-pocket expenses. After 2 years of treatment for MAC, antibiotics were changed over to Azithromycin 500 mg p.o. 3 times daily W.    January 2021. Admitted to [facility] with uncontrollable shaking chills, fever of 104 °F, cough, fatigue, diarrhea and dyspnea. He tested positive for SARS-CoV-2. Treated with Remdesivir. He developed a productive cough. Respiratory culture was ordered and he was treated with Cefepime. He was discharged on Cefuroxime. After the discharge respiratory cultures came back with Pseudomonas. He was seen in the office in follow-up and actually was doing well.   His repeat AFB culture came back positive for Mycobacterium avium complex. Clarithromycin was switched over to Moxifloxacin. He continued on B: Rifampin. He was offered Arikayce inhalation but it was too expensive so he never took it.     October 2020.  Admitted to [facility] with chest discomfort, rhinorrhea, sore throat and a low-grade fever.  Tested positive for Rhinovirus/Enterovirus.  Seen by ID and continued Clarithromycin, With ampicillin Rifampin for his MAC infection.  He was seen in the office in follow-up on 12/8/2020 and was doing well.  Respiratory culture was ordered.     October 2019.  Admitted to [facility] with fevers, chills, nausea and diarrhea.  He had a productive cough.  Seen by Dr. Tiara Alcaraz. Manfred Flurry was discontinued. Magui Cristinazee a bronchoscopy and was discharged. Hue Orlando in follow-up in the office and was tolerating Ethambutol, Rifampin and Clarithromycin 3 times a week.     June 2019.  Readmitted to [facility] with cough, malaise and a fever of 101.1 °F.  Tested positive for parainfluenza 3 virus.  Treated with Doxycycline.  Also treated for herpes simplex of the nares and lips with oral Acyclovir and discharge.  Seen in follow-up on 7/12/2019.  Respiratory cultures have turned positive for Mycobacterium avium complex and Penicillium.  We requested sensitivities for MAC.  The organism was sensitive to Linezolid, Clarithromycin, Moxifloxacin and Amikacin.  Ethambutol and Rifampin were not tested because of \"inability of susceptibility test to predict outcome\".  We started With albuterol, Rifampin, Clarithromycin and IV Amikacin.  Seen in follow-up on 10/10/2020     May 2019.  Admitted to [facility] with shortness of breath, with fever of 101 °F and chills.  Treated with Vancomycin and Cefepime for a left upper lobe pneumonia.  Seen by ID.  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  This was colonized with MSSA but did not require treatment other than enzymatic debridement. Currently     Partners: Female   Other Topics Concern    None   Social History Narrative    None     Social Determinants of Health     Financial Resource Strain: Low Risk     Difficulty of Paying Living Expenses: Not hard at all   Food Insecurity: No Food Insecurity    Worried About Running Out of Food in the Last Year: Never true    Tess of Food in the Last Year: Never true   Transportation Needs:     Lack of Transportation (Medical): Not on file    Lack of Transportation (Non-Medical): Not on file   Physical Activity:     Days of Exercise per Week: Not on file    Minutes of Exercise per Session: Not on file   Stress:     Feeling of Stress : Not on file   Social Connections:     Frequency of Communication with Friends and Family: Not on file    Frequency of Social Gatherings with Friends and Family: Not on file    Attends [employer] Services: Not on file    Active Member of [address] [place] [address] [employer] or Organizations: Not on file    Attends Club or Organization Meetings: Not on file    Marital Status: Not on file   Intimate Partner Violence:     Fear of Current or Ex-Partner: Not on file    Emotionally Abused: Not on file    Physically Abused: Not on file    Sexually Abused: Not on file   Housing Stability:     Unable to Pay for Housing in the Last Year: Not on file    Number of [place] in the Last Year: Not on file    Unstable Housing in the Last Year: Not on file      Pets: 2 dogs  Travel: None  Patient retired as a     Family History:       Problem Relation Age of Onset    Other Mother          age 80    Other Father          age 80   . Otherwise non-pertinent to the chief complaint. REVIEW OF SYSTEMS:    Constitutional: Negative for fevers, chills, diaphoresis  Neurologic: Negative   Psychiatric: Negative  Rheumatologic: Negative   Endocrine: Negative  Hematologic: Negative  Immunologic: Vaccinated and booster against SARS-CoV-2   ENT: Negative  Respiratory: As a in the HPI.   He does have a productive sputum. The respiratory culture was not ordered  Cardiovascular: Negative  GI: Negative  : Negative  Musculoskeletal: Negative  Skin: No rashes. PHYSICAL EXAM:    Vitals:   /67   Pulse 96   Temp 97.7 °F (36.5 °C) (Oral)   Resp 20   Ht 6' 1\" (1.854 m)   Wt 186 lb 11.2 oz (84.7 kg)   SpO2 92%   BMI 24.63 kg/m²   Constitutional: The patient is awake, alert, and oriented. Skin: Warm and dry. No rashes were noted. HEENT: Eyes show round, and reactive pupils. No jaundice. Moist mucous membranes, no ulcerations, no thrush. Neck: Supple to movements. No lymphadenopathy. Chest: No use of accessory muscles to breathe. Symmetrical expansion. Auscultation reveals no wheezing, crackles, or rhonchi. Cardiovascular: S1 and S2 are rhythmic and regular. No murmurs appreciated. Abdomen: Positive bowel sounds to auscultation. Benign to palpation. No masses felt. No hepatosplenomegaly. Extremities: No clubbing, no cyanosis, no edema. Lines: Peripheral.      CBC+dif:  Recent Labs     02/01/22  1155 02/01/22  1155 02/02/22  0557   WBC 5.8  --  4.8   HGB 18.2*   < > 17.3*   HCT 54.3*   < > 51.6   MCV 90.7   < > 90.1      < > 179   NEUTROABS 4.53   < > 4.36    < > = values in this interval not displayed.      Lab Results   Component Value Date    CRP 1.2 (H) 02/02/2022    CRP 1.1 (H) 02/01/2022    CRP 12.3 (H) 04/13/2021      No results found for: CRPHS  Lab Results   Component Value Date    SEDRATE 2 02/02/2022    SEDRATE 2 02/01/2022    SEDRATE 22 (H) 04/13/2021     Lab Results   Component Value Date    ALT 10 02/02/2022    AST 15 02/02/2022    ALKPHOS 63 02/02/2022    BILITOT 0.5 02/02/2022     Lab Results   Component Value Date     02/02/2022    K 4.3 02/02/2022    K 4.0 02/01/2022     02/02/2022    CO2 20 02/02/2022    BUN 14 02/02/2022    CREATININE 0.8 02/02/2022    GFRAA >60 02/02/2022    LABGLOM >60 02/02/2022    GLUCOSE 158 02/02/2022    PROT 6.4 02/02/2022 hours. Recent Labs     02/01/22  1740   PROCAL 0.11*       Assessment:  · MAC infection of the lung. Treated with 3 antimycobacterial agents for 2 years. Now on suppressive Azithromycin 500 mg p.o. 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 I will be discussing this case with the treating physicians.     Mya Leung MD  2:05 PM  2/2/2022

## 2022-02-02 NOTE — PROGRESS NOTES
Occupational Therapy  Date:2/2/2022  Patient Name: Chalino Gilbert  Room: 5650/1127-I     Occupational Therapy (OT) order received and patient's medical record reviewed. Patient reported that he is independent with ADLs and functional transfers/mobility and declined provision of OT services during this hospitalization. Patient noted the he owns a shower chair, which he uses as needed; patient indicated Good understanding of energy conservation techniques for implementation into ADL/IADL routines. No OT evaluation completed, per patient preference. Vanessa Wells, OTR/L  License Number: [id number]

## 2022-02-02 NOTE — H&P
History and Physical      CHIEF COMPLAINT: Short of breath    History of Present Illness: 51-year-old male patient of Dr. Nery Dawn I am asked to admit and follow. History obtained from patient as well as extensive review electronic record. Patient states the last few weeks becoming more short of breath no chest pains but does feel tight in the chest.  He has had intermittent cough as well as worsening sore throat. No fever chills night sweats. He has known severe COPD follows with pulmonary; chronically 3 L nasal cannula at home. Noted to have prior infection with Mycobacterium avium complex. Was first seen by ID with treatment started in September 2019. Upon presentation to the ED SPO2 was 71 on 3 L nasal cannula. Currently SPO2 91 on 6 L nasal cannula. Chest x-ray done admission with following results--    FINDINGS:   The cardiac silhouette is within normals. Significant emphysematous changes are noted. There is chronic consolidation within the right lung base which may represent   chronic elevation of the hemidiaphragm and chronic pleuroparenchymal scarring   within the right costophrenic angle. Patchy bibasilar opacities are noted.  These findings could represent   pneumonia.  Chronic scarring is not excluded.       Impression:       1. Advanced emphysematous changes. 2. Patchy bibasilar opacities which could represent chronic scarring or   possibly pneumonia   3. Chronic elevation of the right hemidiaphragm with pleuroparenchymal   scarring within the right lung base.          --Procalcitonin 0.11. Today sodium 131 CO2 20. Ionized calcium 1.35 total calcium 9.7. Fasting glucose 158. Lactic acid 1.8. Uric acid 4.8.  proBNP 647. LDL 72. Albumin 3.9. A1c 5.4 TSH 0.858. WBC 4.8 hemoglobin 17.3. Platelets 826. Ferritin 63; iron and iron saturation both low at 45/13 respectively. D83 folic acid normal.  ESR 2. D-dimer from yesterday 448.   Molecular/PCR viral respiratory panel with positive for coronavirus 229 ED; Legionella and strep antigens presumptive negative. --Admission to the hospital 2021 due to acute heart failure with preserved ejection fraction  --Hospitalization 2021 due to acute respiratory failure due to SARS-CoV-2  --Found to have thoracic ascending aortic aneurysm 11/15/2018; proximal ascending aorta 3.8 cm  --Patient admitted 2019 due to parainfluenza virus pneumonia. The above was extremely slow resolving with left upper lung infiltrate. He then underwent bronchoscopy with BAL. --Fungal results became available approximately 2019, positive Fungitell. --On 2019 sensitivities were available and ID recommended starting intravenous amikacin 1400 mg IV daily; rifampin 600 mg by mouth daily; clarithromycin 1000 mg/day ethambutol 1200 mg daily by mouth.  --Mother  age 80, father  age 80.   --Patient quit smoking , 92-pack-year history  --Patient has been vaccinated for COVID-19 and received booster      Past Medical History:   Diagnosis Date    Acute and chronic respiratory failure with hypoxia ([address] 10/12/2017    Acute heart failure with preserved ejection fraction ([address] 2021    Acute respiratory disease due to severe acute respiratory syndrome coronavirus 2 (SARS-CoV-2) 2021    Acute respiratory failure with hypoxia ([address] 2018    Bullous emphysema ([address] 2018    Chronic back pain     Degeneration of umbar intervertebral disc    Colon cancer screening     COPD (chronic obstructive pulmonary disease) ([address]     Cough with hemoptysis 2019    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 ([address] 08/15/2019    First seen by ID; treatment started 2019    Emphysema lung ([address]     Glucose intolerance 06/10/2019    Hilar adenopathy 06/10/2019    Hypophosphatemia 06/10/2019    Infection due to parainfluenza virus 3 06/10/2019    Left upper lobe pneumonia 10/12/2017    Pleural effusion on right 10/03/2019    Pulmonary emphysema with fibrosis of lung ([facility] 11/15/2018    Pulmonary Mycobacterium avium complex (MAC) infection ([facility] 07/12/2019    BAL results finally completed this date    Rhinovirus infection 10/16/2020    Right lower lobe pneumonia 11/15/2018    Recurrent 4/23/19    S/P lumbar fusion 06/01/2019    Thoracic ascending aortic aneurysm ([facility] 11/15/2018    Proximal ascending aorta; 3.8 cm; 11/15/18         Past Surgical History:   Procedure Laterality Date    ABSCESS DRAINAGE  2006    X2 RECTAL ABSCESS    BRONCHOSCOPY N/A 6/12/2019    BRONCHOSCOPY BRUSHINGS performed by Austin Starks MD at [address] N/A 10/7/2019    BRONCHOSCOPY DIAGNOSTIC OR CELL [address] ONLY performed by Austin Starks MD at [address] N/A 4/12/2021    BRONCHOSCOPY performed by Austin Starks MD at [address] COLONOSCOPY  11/18/2013    HC INSERT PICC CATH, 5/> YRS  4/10/2021         LUMBAR FUSION  03/2019    [facility]       Medications Prior to Admission:    Medications Prior to Admission: azithromycin (ZITHROMAX) 250 MG tablet, Take 500 mg by mouth daily  Probiotic Product (PROBIOTIC-10 PO), Take by mouth  etodolac (LODINE) 500 MG tablet, Take 500 mg by mouth daily  Fluticasone-Umeclidin-Vilant (TRELEGY ELLIPTA IN), Inhale 1 puff into the lungs daily  Handicap Placard MISC, by Does not apply route Patient cannot walk 200 ft without stopping to rest.   Expiration 5 yrs. [DISCONTINUED] moxifloxacin (AVELOX) 400 MG tablet, Take 400 mg by mouth three times a week Monday, Wednesday, and Friday   ipratropium-albuterol (DUONEB) 0.5-2.5 (3) MG/3ML SOLN nebulizer solution, Inhale 3 mLs into the lungs every 4 hours (while awake)  Respiratory Therapy Supplies (FULL KIT NEBULIZER SET) MISC, Use as directed with nebulized medication. oxyCODONE-acetaminophen (PERCOCET) 7.5-325 MG per tablet, Take 1 tablet by mouth 2 times daily as needed for Pain. Allergies:    Patient has no known allergies. Social History:    reports that he quit smoking about 7 years ago. His smoking use included cigarettes. He started smoking about 53 years ago. He has a 92.00 pack-year smoking history. He has never used smokeless tobacco. He reports that he does not drink alcohol and does not use drugs. Family History:   family history includes Other in his father and mother. REVIEW OF SYSTEMS:  As above in the HPI, otherwise negative    PHYSICAL EXAM:    VS: /67   Pulse 96   Temp 97.7 °F (36.5 °C) (Oral)   Resp 14   Ht 6' 1\" (1.854 m)   Wt 186 lb 11.2 oz (84.7 kg)   SpO2 91%   BMI 24.63 kg/m²     General appearance: Alert, Awake, Oriented times 3, no distress; nasal cannula oxygen in place sitting up in bed finishing lunch  Skin: Warm and dry ; no rashes  Head: Normocephalic. No masses, lesions or tenderness noted  Eyes: Conjunctivae pink, sclera white. PERRL,EOM-I  Ears: External ears normal  Nose/Sinuses: Nares normal. Septum midline. Mucosa normal. No drainage  Oropharynx: Oropharynx clear with no exudate seen  Neck: Supple. No jugular venous distension, lymphadenopathy or thyromegaly Trachea midline  Lungs: Wheezes and rhonchi bilaterally  Heart: S1 S2  Regular rate and rhythm. No rub, murmur or gallop  Abdomen: Soft, non-tender. BS normal. No masses, organomegaly; no rebound or guarding  Extremities: No edema, Peripheral pulses palpable  Musculoskeletal: Muscular strength appears intact. Neuro:  No focal motor defects ; II-XII grossly intact .  GLASER equally  Breast: deferred  Rectal: deferred  Genitalia:  deferred    LABS:  CBC:   Lab Results   Component Value Date    WBC 4.8 02/02/2022    RBC 5.73 02/02/2022    HGB 17.3 02/02/2022    HCT 51.6 02/02/2022    MCV 90.1 02/02/2022    MCH 30.2 02/02/2022    MCHC 33.5 02/02/2022    RDW 14.6 02/02/2022     02/02/2022    MPV 10.5 02/02/2022     CBC with Differential:    Lab Results   Component Value Date WBC 4.8 02/02/2022    RBC 5.73 02/02/2022    HGB 17.3 02/02/2022    HCT 51.6 02/02/2022     02/02/2022    MCV 90.1 02/02/2022    MCH 30.2 02/02/2022    MCHC 33.5 02/02/2022    RDW 14.6 02/02/2022    NRBC 0.0 04/07/2021    LYMPHOPCT 11.7 02/01/2022    MONOPCT 9.3 02/01/2022    MYELOPCT 0.9 04/07/2021    BASOPCT 0.5 02/01/2022    MONOSABS 0.54 02/01/2022    LYMPHSABS 0.68 02/01/2022    EOSABS 0.03 02/01/2022    BASOSABS 0.03 02/01/2022     Hemoglobin/Hematocrit:    Lab Results   Component Value Date    HGB 17.3 02/02/2022    HCT 51.6 02/02/2022     CMP:    Lab Results   Component Value Date     02/02/2022    K 4.3 02/02/2022    K 4.0 02/01/2022     02/02/2022    CO2 20 02/02/2022    BUN 14 02/02/2022    CREATININE 0.8 02/02/2022    GFRAA >60 02/02/2022    LABGLOM >60 02/02/2022    GLUCOSE 158 02/02/2022    PROT 6.4 02/02/2022    LABALBU 3.9 02/02/2022    CALCIUM 9.7 02/02/2022    BILITOT 0.5 02/02/2022    ALKPHOS 63 02/02/2022    AST 15 02/02/2022    ALT 10 02/02/2022     BMP:    Lab Results   Component Value Date     02/02/2022    K 4.3 02/02/2022    K 4.0 02/01/2022     02/02/2022    CO2 20 02/02/2022    BUN 14 02/02/2022    LABALBU 3.9 02/02/2022    CREATININE 0.8 02/02/2022    CALCIUM 9.7 02/02/2022    GFRAA >60 02/02/2022    LABGLOM >60 02/02/2022    GLUCOSE 158 02/02/2022     Hepatic Function Panel:    Lab Results   Component Value Date    ALKPHOS 63 02/02/2022    ALT 10 02/02/2022    AST 15 02/02/2022    PROT 6.4 02/02/2022    BILITOT 0.5 02/02/2022    BILIDIR <0.2 02/02/2022    IBILI see below 02/02/2022    LABALBU 3.9 02/02/2022     Ionized Calcium:  No results found for: IONCA  Magnesium:    Lab Results   Component Value Date    MG 1.8 02/02/2022     Phosphorus:    Lab Results   Component Value Date    PHOS 2.8 02/02/2022     LDH:    Lab Results   Component Value Date     01/02/2021     Uric Acid:    Lab Results   Component Value Date    LABURIC 4.8 02/02/2022 PT/INR:    Lab Results   Component Value Date    PROTIME 12.5 05/30/2019    INR 1.1 05/30/2019     Warfarin PT/INR:  No components found for: Jeremiah Schulte  PTT:    Lab Results   Component Value Date    APTT 31.4 05/30/2019   [APTT}  Troponin:    Lab Results   Component Value Date    TROPONINI <0.01 04/08/2021     Last 3 Troponin:    Lab Results   Component Value Date    TROPONINI <0.01 04/08/2021    TROPONINI <0.01 04/08/2021    TROPONINI <0.01 01/02/2021     U/A:    Lab Results   Component Value Date    COLORU Yellow 02/01/2022    PROTEINU Negative 02/01/2022    PHUR 5.5 02/01/2022    LABCAST FEW 10/02/2019    WBCUA NONE 04/07/2021    RBCUA NONE 04/07/2021    MUCUS Present 04/07/2021    BACTERIA FEW 04/07/2021    CLARITYU Clear 02/01/2022    SPECGRAV 1.020 02/01/2022    LEUKOCYTESUR Negative 02/01/2022    UROBILINOGEN 0.2 02/01/2022    BILIRUBINUR MODERATE 02/01/2022    BLOODU Negative 02/01/2022    GLUCOSEU Negative 02/01/2022     ABG:    Lab Results   Component Value Date    PH 7.440 05/28/2019    PCO2 32.1 05/28/2019    PO2 59.1 05/28/2019    HCO3 21.3 05/28/2019    BE -1.8 05/28/2019    O2SAT 91.6 05/28/2019     HgBA1c:    Lab Results   Component Value Date    LABA1C 5.4 02/01/2022     FLP:    Lab Results   Component Value Date    TRIG 41 02/02/2022    HDL 61 02/02/2022    LDLCALC 72 02/02/2022    LABVLDL 8 02/02/2022     TSH:    Lab Results   Component Value Date    TSH 0.858 02/02/2022     VITAMIN B12: No components found for: B12  FOLATE:    Lab Results   Component Value Date    FOLATE 12.7 02/01/2022     IRON:    Lab Results   Component Value Date    IRON 45 02/02/2022     Iron Saturation:  No components found for: PERCENTFE  TIBC:    Lab Results   Component Value Date    TIBC 339 02/02/2022     FERRITIN:    Lab Results   Component Value Date    FERRITIN 63 02/02/2022       RADIOLOGY:  XR CHEST PORTABLE   Final Result   1. Advanced emphysematous changes.    2. Patchy bibasilar opacities which could represent chronic scarring or   possibly pneumonia   3. Chronic elevation of the right hemidiaphragm with pleuroparenchymal   scarring within the right lung base. ASSESSMENT:      Active Hospital Problems    Diagnosis     Acute respiratory failure with hypoxia (HCC) [J96.01]     Acute on chronic respiratory failure with hypoxia (HCC) [J96.21]     Pulmonary Mycobacterium avium complex (MAC) infection ([facility] [A31.0]     Bullous emphysema ([facility] [J43.9]     COPD (chronic obstructive pulmonary disease) ([facility] [J44.9]        PLAN:  Medications discussed with patient  GI prophylaxis  DVT prophylaxis  Pulmonary consultation  Arformoterol 15 mcg twice daily  Budesonide 250 mcg twice daily  Cefepime 2 g IV every 24 hours  Doxycycline 100 mg IV every 12 hours  DuoNeb every 4 hours while awake  Enoxaparin 40 mg daily  Molecular/PCR viral respiratory panel  Methylprednisolone 60 mg IV every 6 hours  Percocet 7.5 mg twice daily as needed  ID consult; he was post to follow-up with ID as outpatient 2/5/2022.       Please note that over 50 minutes was spent in evaluating the patient, review of records and results, discussion with staff/family, etc.    Beba Serrano DO    10:25 AM  2/2/2022    Voice recognition software use for dictation

## 2022-02-02 NOTE — CONSULTS
Pulmonary Consultation    Admit Date: 2/1/2022  Requesting Physician: Henrik Hicks MD    Reason for consultation:  · Acute Exacerbation of COPD   HPI:  · Patient is a 73year old male who presents to ER with complaints of increased dyspnea. 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 He was having to turn up his oxygen at home to about 4-5 L when his baseline is 3L at home. He also takes his medications everyday and was using his PRN rescue inhaler frequently. At home he was having chest tightness without chest pain and a cough that was producing yellow sputum. He did have a few low grade fever of 99.9 at home. He states this is how he feels when he gets pneumonia. · Since he has been admitted he has been started on IV Solumedrol 60mg Q6H, nebulizer treatments, and IV antibiotics. He has required 6L of oxygen. · Patient is seen this AM resting in bed with 6L nasal cannula at 91%. He complains of dyspnea mostly with activity. He does not currently have a productive cough and denies fever or chills.      PMH:    Past Medical History:   Diagnosis Date    Acute and chronic respiratory failure with hypoxia ([address] 10/12/2017    Acute heart failure with preserved ejection fraction ([address] 4/8/2021    Acute respiratory disease due to severe acute respiratory syndrome coronavirus 2 (SARS-CoV-2) 01/01/2021    Acute respiratory failure with hypoxia (HCC) 11/12/2018    Bullous emphysema ([address] 11/12/2018    Chronic back pain     Degeneration of umbar intervertebral disc    Colon cancer screening     COPD (chronic obstructive pulmonary disease) ([address]     Cough with hemoptysis 04/23/2019    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 ([address] 08/15/2019    First seen by ID; treatment started 9/4/2019    Emphysema lung ([address]     Glucose intolerance 06/10/2019    Hilar adenopathy 06/10/2019    Hypophosphatemia 06/10/2019    Infection due to parainfluenza virus 3 06/10/2019    Left upper lobe pneumonia 10/12/2017    Pleural effusion on right 10/03/2019    Pulmonary emphysema with fibrosis of lung ([facility] 11/15/2018    Pulmonary Mycobacterium avium complex (MAC) infection ([facility] 07/12/2019    BAL results finally completed this date    Rhinovirus infection 10/16/2020    Right lower lobe pneumonia 11/15/2018    Recurrent 4/23/19    S/P lumbar fusion 06/01/2019    Thoracic ascending aortic aneurysm ([facility] 11/15/2018    Proximal ascending aorta; 3.8 cm; 11/15/18     PSH:   Past Surgical History:   Procedure Laterality Date    ABSCESS DRAINAGE  2006    X2 RECTAL ABSCESS    BRONCHOSCOPY N/A 6/12/2019    BRONCHOSCOPY BRUSHINGS performed by Katja Monterroso MD at [address] [place] [address] N/A 10/7/2019    BRONCHOSCOPY DIAGNOSTIC OR CELL [address] ONLY performed by Katja Monterroso MD at [address] [place] [address] N/A 4/12/2021    BRONCHOSCOPY performed by Katja [facility]terroso MD at [facility] COLONOSCOPY  11/18/2013    HC INSERT PICC CATH, 5/> YRS  4/10/2021         LUMBAR FUSION  03/2019    [facility]       Review of Systems:     · Constitutional: As noted in the HPI. · Eyes: No visual changes or diplopia. No scleral icterus. · ENT: No headaches, hearing loss or vertigo. No nasal congestion, or sore throat. · Cardiovascular: No chest pain, dyspnea on exertion, or palpitations. · Respiratory: See above  · Gastrointestinal: No abdominal pain, nausea or emesis. No diarrhea or rectal bleeding or melena. No change in bowel habits. · Genitourinary: No dysuria, urinary frequency, or incontinence. No hematuria. · Musculoskeletal: No gait disturbance, weakness or joint complaints. · Integumentary: No rash or pruritis. No abnormal pigmentation, hair or nail changes. · Neurological: No headache, diplopia, dizziness, tremor, change in muscle strength, numbness or tingling.  No change in gait, balance, coordination, mood, affect, memory, mentation, behavior. · Psychiatric: No anxiety or depression. · Endocrine: No temperature intolerance, excessive thirst, fluid intake, urinary frequency, excessive appetite, or recent weight change. · Hematologic/Lymphatic: No abnormal bruising or bleeding, blood clots or swollen lymph nodes. No anemia, fever, chills, night sweats, or swollen glands. · Allergic/Immunologic: No seasonal or perenial allergies. No history of hives or atopic dermatitis. Social History:  · Alcohol:  Denies  · Tobacco:  Quit smoking in . Previous pack years 92.00. · Employment:  no silica or asbestos exposure  · Family:  No family history of lung disease    Medications:   sodium chloride      sodium chloride 75 mL/hr at 22 1713      cefepime  2,000 mg IntraVENous Q24H    doxycycline (VIBRAMYCIN) IV  100 mg IntraVENous Q12H    ibuprofen  400 mg Oral TID WC    Arformoterol Tartrate  15 mcg Nebulization BID    budesonide  250 mcg Nebulization BID    ipratropium-albuterol  1 ampule Inhalation Q4H WA    enoxaparin  40 mg SubCUTAneous Daily    pantoprazole  40 mg Oral QAM AC    methylPREDNISolone  60 mg IntraVENous Q6H       Vitals:  Tmax:  VITALS:  /67   Pulse 96   Temp 97.7 °F (36.5 °C) (Oral)   Resp 14   Ht 6' 1\" (1.854 m)   Wt 186 lb 11.2 oz (84.7 kg)   SpO2 91%   BMI 24.63 kg/m²   24HR INTAKE/OUTPUT:  No intake or output data in the 24 hours ending 22 1103  CURRENT PULSE OXIMETRY:  SpO2: 91 %  24HR PULSE OXIMETRY RANGE:  SpO2  Av.9 %  Min: 71 %  Max: 96 %    EXAM:  General: No distress. Alert. Eyes: PERRL. No sclera icterus. No conjunctival injection. ENT: No discharge. Pharynx clear. Neck: Trachea midline. Normal thyroid. No jvd, no hjr. Resp: no wheezing. No accessory muscle use. bilateral rales. no rhonchi. CV: Regular rate. Regular rhythm. No murmur No rub. Abd: Non-tender. Non-distended. No masses. No organmegaly. Normal bowel sounds. Skin: Warm and dry.  No nodule on exposed extremities. No rash on exposed extremities. Lymph: No cervical LAD. No supraclavicular LAD. Ext: No joint deformity. No clubbing. No cyanosis. No edema  Neuro: Awake. Follows commands. Positive pupils/gag/corneals. Normal pain response. Lab Results:  CBC:   Recent Labs     02/01/22  1155 02/02/22  0557   WBC 5.8 4.8   HGB 18.2* 17.3*   HCT 54.3* 51.6   MCV 90.7 90.1    179       BMP:  Recent Labs     02/01/22  1155 02/02/22  0557    131*   K 4.0 4.3    100   CO2 21* 20*   PHOS  --  2.8   BUN 9 14   CREATININE 0.8 0.8    ALB:3,BILIDIR:3,BILITOT:3,ALKPHOS:3)@    PT/INR: No results for input(s): PROTIME, INR in the last 72 hours. Cultures:  Sputum: not available  Blood: not available    ABG:   No results for input(s): PH, PO2, PCO2, HCO3, BE, O2SAT, METHB, O2HB, COHB, O2CON, HHB, THB in the last 72 hours. Films:     XR CHEST PORTABLE   Final Result   1. Advanced emphysematous changes. 2. Patchy bibasilar opacities which could represent chronic scarring or   possibly pneumonia   3. Chronic elevation of the right hemidiaphragm with pleuroparenchymal   scarring within the right lung base. .        Assessment:  1. Acute Exacerbation COPD gold class 4.   2. Coronavirus 229E. 3. Mycobacterium avium complex. Plan:  1. Continue IV solumedrol 60mg Q6H. Possibly wean tomorrow. 2. Continue nebulizers to treat COPD. 3. Consult to ID for antibiotic therapy. He was due for follow up with DAT 2/5/2022. Thanks for letting us see this patient in consultation. Total time in reviewing the previous admissions and records, reviewing the current x-rays, labs, and discussing with clinical staff including nursing and physicians, exceeded 50 minutes. Please contact us with any questions. Office [phone] or after hours through [facility], x [phone].     Please note that voice recognition technology was used (while wearing a Covid mandated mask) in the preparation of this note and make therefore it may contain inadvertent transcription errors. If the patient is a COVID 19 isolation patient, the above physical exam reflects that of the examining physician for the day.     MIGUE Fletcher - NP    [facility], [address] [place] [address] [facility], [address], ANJANA JEFFERY [facility] [place]

## 2022-02-02 NOTE — PROGRESS NOTES
Physical Therapy    PT orders received. Pt reports he has been getting up on his own and does not feel he needs PT. Will discharge order per pt request. Elizabeth Aguilar pt to let staff know if PT needs arise.

## 2022-02-03 PROBLEM — D84.9 IMMUNOCOMPROMISED (HCC): Status: ACTIVE | Noted: 2022-02-03

## 2022-02-03 PROBLEM — E61.1 IRON DEFICIENCY: Status: ACTIVE | Noted: 2022-02-03

## 2022-02-03 LAB
ALBUMIN SERPL-MCNC: 3.6 G/DL (ref 3.5–5.2)
ALP BLD-CCNC: 62 U/L (ref 40–129)
ALT SERPL-CCNC: 10 U/L (ref 0–40)
ANION GAP SERPL CALCULATED.3IONS-SCNC: 11 MMOL/L (ref 7–16)
ANISOCYTOSIS: ABNORMAL
AST SERPL-CCNC: 18 U/L (ref 0–39)
BASOPHILS ABSOLUTE: 0.01 E9/L (ref 0–0.2)
BASOPHILS RELATIVE PERCENT: 0.1 % (ref 0–2)
BILIRUB SERPL-MCNC: 0.2 MG/DL (ref 0–1.2)
BILIRUBIN DIRECT: <0.2 MG/DL (ref 0–0.3)
BILIRUBIN, INDIRECT: ABNORMAL MG/DL (ref 0–1)
BUN BLDV-MCNC: 16 MG/DL (ref 6–23)
C-REACTIVE PROTEIN: 0.5 MG/DL (ref 0–0.4)
CALCIUM SERPL-MCNC: 9.3 MG/DL (ref 8.6–10.2)
CHLORIDE BLD-SCNC: 104 MMOL/L (ref 98–107)
CO2: 22 MMOL/L (ref 22–29)
CREAT SERPL-MCNC: 0.7 MG/DL (ref 0.7–1.2)
EOSINOPHILS ABSOLUTE: 0 E9/L (ref 0.05–0.5)
EOSINOPHILS RELATIVE PERCENT: 0 % (ref 0–6)
GFR AFRICAN AMERICAN: >60
GFR NON-AFRICAN AMERICAN: >60 ML/MIN/1.73
GLUCOSE BLD-MCNC: 155 MG/DL (ref 74–99)
HCT VFR BLD CALC: 47.3 % (ref 37–54)
HEMOGLOBIN: 15.7 G/DL (ref 12.5–16.5)
IMMATURE GRANULOCYTES #: 0.03 E9/L
IMMATURE GRANULOCYTES %: 0.3 % (ref 0–5)
LYMPHOCYTES ABSOLUTE: 0.35 E9/L (ref 1.5–4)
LYMPHOCYTES RELATIVE PERCENT: 3.2 % (ref 20–42)
MAGNESIUM: 1.9 MG/DL (ref 1.6–2.6)
MCH RBC QN AUTO: 30.3 PG (ref 26–35)
MCHC RBC AUTO-ENTMCNC: 33.2 % (ref 32–34.5)
MCV RBC AUTO: 91.1 FL (ref 80–99.9)
MONOCYTES ABSOLUTE: 0.25 E9/L (ref 0.1–0.95)
MONOCYTES RELATIVE PERCENT: 2.3 % (ref 2–12)
NEUTROPHILS ABSOLUTE: 10.42 E9/L (ref 1.8–7.3)
NEUTROPHILS RELATIVE PERCENT: 94.1 % (ref 43–80)
PDW BLD-RTO: 14.8 FL (ref 11.5–15)
PHOSPHORUS: 2.7 MG/DL (ref 2.5–4.5)
PLATELET # BLD: 181 E9/L (ref 130–450)
PMV BLD AUTO: 10.5 FL (ref 7–12)
POTASSIUM SERPL-SCNC: 4.7 MMOL/L (ref 3.5–5)
RBC # BLD: 5.19 E12/L (ref 3.8–5.8)
SEDIMENTATION RATE, ERYTHROCYTE: 1 MM/HR (ref 0–15)
SODIUM BLD-SCNC: 137 MMOL/L (ref 132–146)
TOTAL PROTEIN: 5.9 G/DL (ref 6.4–8.3)
WBC # BLD: 11.1 E9/L (ref 4.5–11.5)

## 2022-02-03 PROCEDURE — 2700000000 HC OXYGEN THERAPY PER DAY

## 2022-02-03 PROCEDURE — 6370000000 HC RX 637 (ALT 250 FOR IP): Performed by: INTERNAL MEDICINE

## 2022-02-03 PROCEDURE — 86140 C-REACTIVE PROTEIN: CPT

## 2022-02-03 PROCEDURE — 6360000002 HC RX W HCPCS: Performed by: INTERNAL MEDICINE

## 2022-02-03 PROCEDURE — 84100 ASSAY OF PHOSPHORUS: CPT

## 2022-02-03 PROCEDURE — 87070 CULTURE OTHR SPECIMN AEROBIC: CPT

## 2022-02-03 PROCEDURE — 6360000002 HC RX W HCPCS

## 2022-02-03 PROCEDURE — 85025 COMPLETE CBC W/AUTO DIFF WBC: CPT

## 2022-02-03 PROCEDURE — 87206 SMEAR FLUORESCENT/ACID STAI: CPT

## 2022-02-03 PROCEDURE — 36415 COLL VENOUS BLD VENIPUNCTURE: CPT

## 2022-02-03 PROCEDURE — 87205 SMEAR GRAM STAIN: CPT

## 2022-02-03 PROCEDURE — 2580000003 HC RX 258: Performed by: INTERNAL MEDICINE

## 2022-02-03 PROCEDURE — 83735 ASSAY OF MAGNESIUM: CPT

## 2022-02-03 PROCEDURE — 85651 RBC SED RATE NONAUTOMATED: CPT

## 2022-02-03 PROCEDURE — 2060000000 HC ICU INTERMEDIATE R&B

## 2022-02-03 PROCEDURE — 80076 HEPATIC FUNCTION PANEL: CPT

## 2022-02-03 PROCEDURE — 94640 AIRWAY INHALATION TREATMENT: CPT

## 2022-02-03 PROCEDURE — 80048 BASIC METABOLIC PNL TOTAL CA: CPT

## 2022-02-03 RX ORDER — METHYLPREDNISOLONE SODIUM SUCCINATE 40 MG/ML
40 INJECTION, POWDER, LYOPHILIZED, FOR SOLUTION INTRAMUSCULAR; INTRAVENOUS EVERY 6 HOURS
Status: DISCONTINUED | OUTPATIENT
Start: 2022-02-03 | End: 2022-02-04

## 2022-02-03 RX ORDER — FERROUS SULFATE 325(65) MG
325 TABLET ORAL 2 TIMES DAILY WITH MEALS
Status: DISCONTINUED | OUTPATIENT
Start: 2022-02-03 | End: 2022-02-07 | Stop reason: HOSPADM

## 2022-02-03 RX ORDER — AZITHROMYCIN 250 MG/1
500 TABLET, FILM COATED ORAL
Status: DISCONTINUED | OUTPATIENT
Start: 2022-02-04 | End: 2022-02-07 | Stop reason: HOSPADM

## 2022-02-03 RX ADMIN — METHYLPREDNISOLONE SODIUM SUCCINATE 60 MG: 125 INJECTION, POWDER, LYOPHILIZED, FOR SOLUTION INTRAMUSCULAR; INTRAVENOUS at 02:33

## 2022-02-03 RX ADMIN — PANTOPRAZOLE SODIUM 40 MG: 40 TABLET, DELAYED RELEASE ORAL at 05:55

## 2022-02-03 RX ADMIN — BUDESONIDE 250 MCG: 0.25 SUSPENSION RESPIRATORY (INHALATION) at 07:56

## 2022-02-03 RX ADMIN — ARFORMOTEROL TARTRATE 15 MCG: 15 SOLUTION RESPIRATORY (INHALATION) at 07:56

## 2022-02-03 RX ADMIN — IBUPROFEN 400 MG: 400 TABLET, FILM COATED ORAL at 11:20

## 2022-02-03 RX ADMIN — ENOXAPARIN SODIUM 40 MG: 100 INJECTION SUBCUTANEOUS at 16:20

## 2022-02-03 RX ADMIN — IPRATROPIUM BROMIDE AND ALBUTEROL SULFATE 1 AMPULE: 2.5; .5 SOLUTION RESPIRATORY (INHALATION) at 12:08

## 2022-02-03 RX ADMIN — METHYLPREDNISOLONE SODIUM SUCCINATE 40 MG: 40 INJECTION, POWDER, LYOPHILIZED, FOR SOLUTION INTRAMUSCULAR; INTRAVENOUS at 21:37

## 2022-02-03 RX ADMIN — SODIUM CHLORIDE: 9 INJECTION, SOLUTION INTRAVENOUS at 02:32

## 2022-02-03 RX ADMIN — ARFORMOTEROL TARTRATE 15 MCG: 15 SOLUTION RESPIRATORY (INHALATION) at 19:41

## 2022-02-03 RX ADMIN — IPRATROPIUM BROMIDE AND ALBUTEROL SULFATE 1 AMPULE: 2.5; .5 SOLUTION RESPIRATORY (INHALATION) at 07:56

## 2022-02-03 RX ADMIN — METHYLPREDNISOLONE SODIUM SUCCINATE 40 MG: 40 INJECTION, POWDER, LYOPHILIZED, FOR SOLUTION INTRAMUSCULAR; INTRAVENOUS at 16:20

## 2022-02-03 RX ADMIN — FERROUS SULFATE TAB 325 MG (65 MG ELEMENTAL FE) 325 MG: 325 (65 FE) TAB at 16:20

## 2022-02-03 RX ADMIN — FERROUS SULFATE TAB 325 MG (65 MG ELEMENTAL FE) 325 MG: 325 (65 FE) TAB at 11:20

## 2022-02-03 RX ADMIN — METHYLPREDNISOLONE SODIUM SUCCINATE 60 MG: 125 INJECTION, POWDER, LYOPHILIZED, FOR SOLUTION INTRAMUSCULAR; INTRAVENOUS at 08:11

## 2022-02-03 RX ADMIN — BUDESONIDE 250 MCG: 0.25 SUSPENSION RESPIRATORY (INHALATION) at 19:41

## 2022-02-03 RX ADMIN — IPRATROPIUM BROMIDE AND ALBUTEROL SULFATE 1 AMPULE: 2.5; .5 SOLUTION RESPIRATORY (INHALATION) at 17:04

## 2022-02-03 RX ADMIN — IPRATROPIUM BROMIDE AND ALBUTEROL SULFATE 1 AMPULE: 2.5; .5 SOLUTION RESPIRATORY (INHALATION) at 19:41

## 2022-02-03 RX ADMIN — SODIUM CHLORIDE: 9 INJECTION, SOLUTION INTRAVENOUS at 15:20

## 2022-02-03 ASSESSMENT — PAIN SCALES - GENERAL
PAINLEVEL_OUTOF10: 0
PAINLEVEL_OUTOF10: 5
PAINLEVEL_OUTOF10: 0
PAINLEVEL_OUTOF10: 0

## 2022-02-03 NOTE — CARE COORDINATION
Social work / Discharge Planning:        Discharge plan is home. Patient's home oxygen is provided by [facility] [place] and his baseline is 4-5 liters. He is currently on 6 liters. He is on IV Solumedrol. Social work will follow to assist with discharge planning.    Electronically signed by MALVIN Stone on 2/3/2022 at 2:05 PM

## 2022-02-03 NOTE — PROGRESS NOTES
Attestation     I have discussed the case, including pertinent history and exam findings with the nurse practitioner. I have reviewed meds and pertinent labs and the key elements of the encounter have been performed by me. I agree with the assessment, plan and orders as documented by the nurse practitioner. Additions and corrections are reflected in the signed note. Rosalba Aleln MD,  M.DOMINGA., F.C.C.P. [facility], [address] [place] [address]         Pulmonary Progress Note    Admit Date: 2/1/2022  Hospital day                               PCP: Kaetlynn Barron MD    Chief Complaint (s):  Patient Active Problem List   Diagnosis    COPD (chronic obstructive pulmonary disease) ([address]    Bullous emphysema ([address]    Pulmonary emphysema with fibrosis of lung ([address]    S/P lumbar fusion    Hypophosphatemia    Glucose intolerance    Hilar adenopathy    Pulmonary Mycobacterium avium complex (MAC) infection ([address]    Acute on chronic respiratory failure with hypoxia ([address]    Chronic respiratory failure with hypoxia ([address]    Erythrocytosis due to hypoxemia    Chronic pain syndrome    Lumbar degenerative disc disease    Chronic low back pain    Acute respiratory failure with hypoxia ([address]    Coronavirus infection       Subjective:  · Patient is seen today on 6L nasal cannula resting in bed. States he is more comfortable than yesterday. He does still complain of dyspnea on exertion mostly.  He says nursing sent a sputum culture from this AM.       Vitals:  VITALS:  /64   Pulse 92   Temp 97.3 °F (36.3 °C) (Oral)   Resp 20   Ht 6' 1\" (1.854 m)   Wt 186 lb 11.2 oz (84.7 kg)   SpO2 94%   BMI 24.63 kg/m²     24HR INTAKE/OUTPUT:      Intake/Output Summary (Last 24 hours) at 2/3/2022 1032  Last data filed at 2/3/2022 0557  Gross per 24 hour   Intake --   Output 1000 ml   Net -1000 ml       24HR PULSE OXIMETRY RANGE:    SpO2  Av.8 %  Min: 91 %  Max: 94 %    Medications:  IV:   sodium chloride 75 mL/hr at 22 0232       Scheduled Meds:   ibuprofen  400 mg Oral TID WC    Arformoterol Tartrate  15 mcg Nebulization BID    budesonide  250 mcg Nebulization BID    ipratropium-albuterol  1 ampule Inhalation Q4H WA    enoxaparin  40 mg SubCUTAneous Daily    pantoprazole  40 mg Oral QAM AC    methylPREDNISolone  60 mg IntraVENous Q6H       Diet:   ADULT DIET; Regular     EXAM:  General: No distress. Alert. Eyes: PERRL. No sclera icterus. No conjunctival injection. ENT: No discharge. Pharynx clear. Neck: Trachea midline. Normal thyroid. Resp: No accessory muscle use. no rales. no wheezing. no rhonchi. CV: Regular rate. Regular rhythm. No murmur or rub. Abd: Non-tender. Non-distended. No masses. No organomegaly. Normal bowel sounds. Skin: Warm and dry. No nodule on exposed extremities. No rash on exposed extremities. Ext: No cyanosis, clubbing, edema  Lymph: No cervical LAD. No supraclavicular LAD. M/S: No cyanosis. No joint deformity. No clubbing. Neuro: Awake. Follows commands. Positive pupils/gag/corneals. Normal pain response. Results:  CBC:   Recent Labs     22  11522  0557 22  0237   WBC 5.8 4.8 11.1   HGB 18.2* 17.3* 15.7   HCT 54.3* 51.6 47.3   MCV 90.7 90.1 91.1    179 181     BMP:   Recent Labs     22  1155 22  0557 22  0237    131* 137   K 4.0 4.3 4.7    100 104   CO2 21* 20* 22   PHOS  --  2.8 2.7   BUN 9 14 16   CREATININE 0.8 0.8 0.7     LIVER PROFILE:   Recent Labs     22  1155 22  0557 22  0237   AST 19 15 18   ALT 12 10 10   BILIDIR  --  <0.2 <0.2   BILITOT 0.7 0.5 0.2   ALKPHOS 73 63 62     PT/INR: No results for input(s): PROTIME, INR in the last 72 hours. APTT: No results for input(s): APTT in the last 72 hours. Pathology:  1. N/A      Microbiology:  1.  None    Recent ABG:   No results for input(s): PH, PO2, PCO2, HCO3, BE, O2SAT, METHB, O2HB, COHB, O2CON, HHB, THB in the last 72 hours. Recent Films:  XR CHEST PORTABLE   Final Result   1. Advanced emphysematous changes. 2. Patchy bibasilar opacities which could represent chronic scarring or   possibly pneumonia   3. Chronic elevation of the right hemidiaphragm with pleuroparenchymal   scarring within the right lung base. Assessment:  1. Acute Exacerbation COPD gold class 4.   2. Coronavirus 229E. 3. Mycobacterium avium complex      Plan:  1. Wean steroids to solumedrol 40mg Q6H.  2. Continue nebulizers to treat COPD  3. Wean oxygen to baseline of 3L as tolerated  4. Respiratory culture pending  5. Appreciate ID input    Time at the bedside, reviewing labs and radiographs, reviewing updated notes and consultations, discussing with staff and family was more than 35 minutes. Please note that voice recognition technology was used in the preparation of this note and make therefore it may contain inadvertent transcription errors. If the patient is a COVID 19 isolation patient, the above physical exam reflects that of the examining physician for the day. MIGUE Sam - NP.     [facility], [address] [place] [address], [address], [facility] [place][note 2]

## 2022-02-03 NOTE — PROGRESS NOTES
PROGRESS  NOTE --                                                          INTERNAL  MEDICINE                                                                              I  PERSONALLY SAW , EXAMINED, AND CARED [address], 2/3/2022     LABS, XRAY ,CHART, AND MEDICATIONS  REVIEWED BY ME       Chief complaint: Short of breath      2/3/2022-SUBJECTIVE: Diana Gallatin is alert awake and cooperative; oriented ×3. Denies any chest pain dyspnea nausea emesis. Tolerating diet. No abdominal pain. Sitting up in bed finishing his lunch. Throat feels better with lozenges. Less cough. I discussed at length coronavirus infection; advised him he does not have SARS-CoV-2. He states his wife has similar symptoms currently at home with sore throat minimal cough. Afebrile last 24 hours. SPO2 94 on 6 L nasal cannula. Intake and output -1000 cc. Fasting glucose 155 CRP 0.5 improving. Procalcitonin 0.11. A1c 5.4 TSH 0.858. WBC 11.1 hemoglobin 15.7. Iron 45, iron saturation 13, both low; TIBC normal ferritin 63. N41 folic acid normal.  ESR one. Respiratory culture pending. Hemoglobin is pending. Molecular/PCR viral respiratory panel positive for coronavirus 229E. Legionella and strep antigen presumptive negative. ID consult from yesterday appreciated. He completed 2 years of treatment with ethambutol rifampin moxifloxacin which was changed to clarithromycin. 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 He never sterilized his respiratory cultures. He was offered Arikayce but declined due to out-of-pocket expenses. Now on suppressive azithromycin 500 mg three times weekly. 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 ID note from today unchanged. Pulmonary note from today wean steroids to 40 mg every 6 hours.       Objective:     PHYSICAL EXAM:    VS: /64 Pulse 92   Temp 97.3 °F (36.3 °C) (Oral)   Resp 20   Ht 6' 1\" (1.854 m)   Wt 186 lb 11.2 oz (84.7 kg)   SpO2 94%   BMI 24.63 kg/m²     Labs:   CBC:   Lab Results   Component Value Date    WBC 11.1 02/03/2022    RBC 5.19 02/03/2022    HGB 15.7 02/03/2022    HCT 47.3 02/03/2022    MCV 91.1 02/03/2022    MCH 30.3 02/03/2022    MCHC 33.2 02/03/2022    RDW 14.8 02/03/2022     02/03/2022    MPV 10.5 02/03/2022     CBC with Differential:    Lab Results   Component Value Date    WBC 11.1 02/03/2022    RBC 5.19 02/03/2022    HGB 15.7 02/03/2022    HCT 47.3 02/03/2022     02/03/2022    MCV 91.1 02/03/2022    MCH 30.3 02/03/2022    MCHC 33.2 02/03/2022    RDW 14.8 02/03/2022    NRBC 0.0 04/07/2021    LYMPHOPCT 3.2 02/03/2022    MONOPCT 2.3 02/03/2022    MYELOPCT 0.9 04/07/2021    BASOPCT 0.1 02/03/2022    MONOSABS 0.25 02/03/2022    LYMPHSABS 0.35 02/03/2022    EOSABS 0.00 02/03/2022    BASOSABS 0.01 02/03/2022     Hemoglobin/Hematocrit:    Lab Results   Component Value Date    HGB 15.7 02/03/2022    HCT 47.3 02/03/2022     CMP:    Lab Results   Component Value Date     02/03/2022    K 4.7 02/03/2022    K 4.0 02/01/2022     02/03/2022    CO2 22 02/03/2022    BUN 16 02/03/2022    CREATININE 0.7 02/03/2022    GFRAA >60 02/03/2022    LABGLOM >60 02/03/2022    GLUCOSE 155 02/03/2022    PROT 5.9 02/03/2022    LABALBU 3.6 02/03/2022    CALCIUM 9.3 02/03/2022    BILITOT 0.2 02/03/2022    ALKPHOS 62 02/03/2022    AST 18 02/03/2022    ALT 10 02/03/2022     BMP:    Lab Results   Component Value Date     02/03/2022    K 4.7 02/03/2022    K 4.0 02/01/2022     02/03/2022    CO2 22 02/03/2022    BUN 16 02/03/2022    LABALBU 3.6 02/03/2022    CREATININE 0.7 02/03/2022    CALCIUM 9.3 02/03/2022    GFRAA >60 02/03/2022    LABGLOM >60 02/03/2022    GLUCOSE 155 02/03/2022     Hepatic Function Panel:    Lab Results   Component Value Date    ALKPHOS 62 02/03/2022    ALT 10 02/03/2022    AST 18 02/03/2022 PROT 5.9 02/03/2022    BILITOT 0.2 02/03/2022    BILIDIR <0.2 02/03/2022    IBILI see below 02/03/2022    LABALBU 3.6 02/03/2022     Ionized Calcium:  No results found for: IONCA  Magnesium:    Lab Results   Component Value Date    MG 1.9 02/03/2022     Phosphorus:    Lab Results   Component Value Date    PHOS 2.7 02/03/2022     LDH:    Lab Results   Component Value Date     01/02/2021     Uric Acid:    Lab Results   Component Value Date    LABURIC 4.8 02/02/2022     PT/INR:    Lab Results   Component Value Date    PROTIME 12.5 05/30/2019    INR 1.1 05/30/2019     Warfarin PT/INR:  No components found for: Devon Ester  PTT:    Lab Results   Component Value Date    APTT 31.4 05/30/2019   [APTT}  Troponin:    Lab Results   Component Value Date    TROPONINI <0.01 04/08/2021     Last 3 Troponin:    Lab Results   Component Value Date    TROPONINI <0.01 04/08/2021    TROPONINI <0.01 04/08/2021    TROPONINI <0.01 01/02/2021     U/A:    Lab Results   Component Value Date    COLORU Yellow 02/01/2022    PROTEINU Negative 02/01/2022    PHUR 5.5 02/01/2022    LABCAST FEW 10/02/2019    WBCUA NONE 04/07/2021    RBCUA NONE 04/07/2021    MUCUS Present 04/07/2021    BACTERIA FEW 04/07/2021    CLARITYU Clear 02/01/2022    SPECGRAV 1.020 02/01/2022    LEUKOCYTESUR Negative 02/01/2022    UROBILINOGEN 0.2 02/01/2022    BILIRUBINUR MODERATE 02/01/2022    BLOODU Negative 02/01/2022    GLUCOSEU Negative 02/01/2022     HgBA1c:    Lab Results   Component Value Date    LABA1C 5.4 02/01/2022     FLP:    Lab Results   Component Value Date    TRIG 41 02/02/2022    HDL 61 02/02/2022    LDLCALC 72 02/02/2022    LABVLDL 8 02/02/2022     TSH:    Lab Results   Component Value Date    TSH 0.858 02/02/2022     VITAMIN B12: No components found for: B12  FOLATE:    Lab Results   Component Value Date    FOLATE 12.7 02/01/2022     IRON:    Lab Results   Component Value Date    IRON 45 02/02/2022     Iron Saturation:  No components found for: PERCENTFE  TIBC:    Lab Results   Component Value Date    TIBC 339 02/02/2022     FERRITIN:    Lab Results   Component Value Date    FERRITIN 63 02/02/2022        General appearance: Alert, Awake, Oriented times 3, no distress; nasal cannula oxygen in place  Skin: Warm and dry ; no rashes  Head: Normocephalic. No masses, lesions or tenderness noted  Eyes: Conjunctivae pink, sclera white. PERRL,EOM-I  Ears: External ears normal  Nose/Sinuses: Nares normal. Septum midline. Mucosa normal. No drainage  Oropharynx: Oropharynx clear with no exudate seen  Neck: Supple. No jugular venous distension, lymphadenopathy or thyromegaly Trachea midline  Lungs: Mostly clear  Heart: S1 S2  Regular rate and rhythm. No rub, murmur or gallop  Abdomen: Soft, non-tender. BS normal. No masses, organomegaly; no rebound or guarding  Extremities: No edema, Peripheral pulses palpable  Musculoskeletal: Muscular strength appears intact. Neuro:  No focal motor defects ; II-XII grossly intact .  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)    Acute respiratory failure with hypoxia (HCC)    Coronavirus infection  Resolved Problems:    Thoracic ascending aortic aneurysm ([address]    Acute hypoxemic respiratory failure (HCC)      PLAN:  SEE ORDERS      RE  CHANGES AND FINDINGS   Medications reviewed with patient  GI prophylaxis  DVT prophylaxis  Consultants notes reviewed  Ferrous sulfate 325 mg twice daily due to iron deficiency  Await results of respiratory culture and immunoglobin studies  Arformoterol 15 mcg twice daily  Budesonide 250 mcg twice daily  Cefepime and doxycycline have been discontinued  DuoNeb every 4 hours while awake  Methylprednisolone decreased to 40 mg IV

## 2022-02-04 LAB
ACINETOBACTER CALCOAC BAUMANNII COMPLEX BY PCR: NOT DETECTED
ALBUMIN SERPL-MCNC: 3.6 G/DL (ref 3.5–5.2)
ALP BLD-CCNC: 62 U/L (ref 40–129)
ALT SERPL-CCNC: 10 U/L (ref 0–40)
ANION GAP SERPL CALCULATED.3IONS-SCNC: 11 MMOL/L (ref 7–16)
AST SERPL-CCNC: 16 U/L (ref 0–39)
BACTEROIDES FRAGILIS BY PCR: NOT DETECTED
BASOPHILS ABSOLUTE: 0 E9/L (ref 0–0.2)
BASOPHILS RELATIVE PERCENT: 0 % (ref 0–2)
BILIRUB SERPL-MCNC: 0.2 MG/DL (ref 0–1.2)
BILIRUBIN DIRECT: <0.2 MG/DL (ref 0–0.3)
BILIRUBIN, INDIRECT: ABNORMAL MG/DL (ref 0–1)
BOTTLE TYPE: NORMAL
BUN BLDV-MCNC: 16 MG/DL (ref 6–23)
C-REACTIVE PROTEIN: 0.3 MG/DL (ref 0–0.4)
CALCIUM SERPL-MCNC: 9.3 MG/DL (ref 8.6–10.2)
CANDIDA ALBICANS BY PCR: NOT DETECTED
CANDIDA AURIS BY PCR: NOT DETECTED
CANDIDA GLABRATA BY PCR: NOT DETECTED
CANDIDA KRUSEI BY PCR: NOT DETECTED
CANDIDA PARAPSILOSIS BY PCR: NOT DETECTED
CANDIDA TROPICALIS BY PCR: NOT DETECTED
CHLORIDE BLD-SCNC: 104 MMOL/L (ref 98–107)
CO2: 22 MMOL/L (ref 22–29)
CREAT SERPL-MCNC: 0.7 MG/DL (ref 0.7–1.2)
CRYPTOCOCCUS NEOFORMANS/GATTII BY PCR: NOT DETECTED
ENTEROBACTER CLOACAE COMPLEX BY PCR: NOT DETECTED
ENTEROBACTERALES BY PCR: NOT DETECTED
ENTEROCOCCUS FAECALIS BY PCR: NOT DETECTED
ENTEROCOCCUS FAECIUM BY PCR: NOT DETECTED
EOSINOPHILS ABSOLUTE: 0 E9/L (ref 0.05–0.5)
EOSINOPHILS RELATIVE PERCENT: 0 % (ref 0–6)
ESCHERICHIA COLI BY PCR: NOT DETECTED
GFR AFRICAN AMERICAN: >60
GFR NON-AFRICAN AMERICAN: >60 ML/MIN/1.73
GLUCOSE BLD-MCNC: 128 MG/DL (ref 74–99)
GRAM STAIN ORDERABLE: NORMAL
HAEMOPHILUS INFLUENZAE BY PCR: NOT DETECTED
HCT VFR BLD CALC: 46.2 % (ref 37–54)
HEMOGLOBIN: 15.2 G/DL (ref 12.5–16.5)
KLEBSIELLA AEROGENES BY PCR: NOT DETECTED
KLEBSIELLA OXYTOCA BY PCR: NOT DETECTED
KLEBSIELLA PNEUMONIAE GROUP BY PCR: NOT DETECTED
LISTERIA MONOCYTOGENES BY PCR: NOT DETECTED
LYMPHOCYTES ABSOLUTE: 0.34 E9/L (ref 1.5–4)
LYMPHOCYTES RELATIVE PERCENT: 3.5 % (ref 20–42)
MAGNESIUM: 1.9 MG/DL (ref 1.6–2.6)
MCH RBC QN AUTO: 30.2 PG (ref 26–35)
MCHC RBC AUTO-ENTMCNC: 32.9 % (ref 32–34.5)
MCV RBC AUTO: 91.8 FL (ref 80–99.9)
MONOCYTES ABSOLUTE: 0.08 E9/L (ref 0.1–0.95)
MONOCYTES RELATIVE PERCENT: 0.9 % (ref 2–12)
NEISSERIA MENINGITIDIS BY PCR: NOT DETECTED
NEUTROPHILS ABSOLUTE: 8.16 E9/L (ref 1.8–7.3)
NEUTROPHILS RELATIVE PERCENT: 95.6 % (ref 43–80)
NUCLEATED RED BLOOD CELLS: 0 /100 WBC
ORDER NUMBER: NORMAL
PDW BLD-RTO: 15 FL (ref 11.5–15)
PHOSPHORUS: 2.7 MG/DL (ref 2.5–4.5)
PLATELET # BLD: 169 E9/L (ref 130–450)
PMV BLD AUTO: 10.6 FL (ref 7–12)
POTASSIUM SERPL-SCNC: 4.2 MMOL/L (ref 3.5–5)
PROTEUS SPECIES BY PCR: NOT DETECTED
PSEUDOMONAS AERUGINOSA BY PCR: NOT DETECTED
RBC # BLD: 5.03 E12/L (ref 3.8–5.8)
RBC # BLD: NORMAL 10*6/UL
SALMONELLA SPECIES BY PCR: NOT DETECTED
SEDIMENTATION RATE, ERYTHROCYTE: 0 MM/HR (ref 0–15)
SERRATIA MARCESCENS BY PCR: NOT DETECTED
SODIUM BLD-SCNC: 137 MMOL/L (ref 132–146)
SOURCE OF BLOOD CULTURE: NORMAL
STAPHYLOCOCCUS AUREUS BY PCR: NOT DETECTED
STAPHYLOCOCCUS EPIDERMIDIS BY PCR: NOT DETECTED
STAPHYLOCOCCUS LUGDUNENSIS BY PCR: NOT DETECTED
STAPHYLOCOCCUS SPECIES BY PCR: NOT DETECTED
STENOTROPHOMONAS MALTOPHILIA BY PCR: NOT DETECTED
STREPTOCOCCUS AGALACTIAE BY PCR: NOT DETECTED
STREPTOCOCCUS PNEUMONIAE BY PCR: NOT DETECTED
STREPTOCOCCUS PYOGENES  BY PCR: NOT DETECTED
STREPTOCOCCUS SPECIES BY PCR: NOT DETECTED
TOTAL PROTEIN: 5.6 G/DL (ref 6.4–8.3)
WBC # BLD: 8.5 E9/L (ref 4.5–11.5)

## 2022-02-04 PROCEDURE — 84100 ASSAY OF PHOSPHORUS: CPT

## 2022-02-04 PROCEDURE — 6370000000 HC RX 637 (ALT 250 FOR IP): Performed by: INTERNAL MEDICINE

## 2022-02-04 PROCEDURE — 94640 AIRWAY INHALATION TREATMENT: CPT

## 2022-02-04 PROCEDURE — 85651 RBC SED RATE NONAUTOMATED: CPT

## 2022-02-04 PROCEDURE — 6360000002 HC RX W HCPCS: Performed by: INTERNAL MEDICINE

## 2022-02-04 PROCEDURE — 6360000002 HC RX W HCPCS

## 2022-02-04 PROCEDURE — 86140 C-REACTIVE PROTEIN: CPT

## 2022-02-04 PROCEDURE — 6370000000 HC RX 637 (ALT 250 FOR IP): Performed by: SPECIALIST

## 2022-02-04 PROCEDURE — 36415 COLL VENOUS BLD VENIPUNCTURE: CPT

## 2022-02-04 PROCEDURE — 80076 HEPATIC FUNCTION PANEL: CPT

## 2022-02-04 PROCEDURE — 83735 ASSAY OF MAGNESIUM: CPT

## 2022-02-04 PROCEDURE — 2060000000 HC ICU INTERMEDIATE R&B

## 2022-02-04 PROCEDURE — 80048 BASIC METABOLIC PNL TOTAL CA: CPT

## 2022-02-04 PROCEDURE — 85025 COMPLETE CBC W/AUTO DIFF WBC: CPT

## 2022-02-04 PROCEDURE — 2580000003 HC RX 258: Performed by: INTERNAL MEDICINE

## 2022-02-04 PROCEDURE — 2700000000 HC OXYGEN THERAPY PER DAY

## 2022-02-04 RX ORDER — METHYLPREDNISOLONE SODIUM SUCCINATE 40 MG/ML
40 INJECTION, POWDER, LYOPHILIZED, FOR SOLUTION INTRAMUSCULAR; INTRAVENOUS EVERY 12 HOURS
Status: COMPLETED | OUTPATIENT
Start: 2022-02-05 | End: 2022-02-06

## 2022-02-04 RX ADMIN — IPRATROPIUM BROMIDE AND ALBUTEROL SULFATE 1 AMPULE: 2.5; .5 SOLUTION RESPIRATORY (INHALATION) at 15:44

## 2022-02-04 RX ADMIN — IPRATROPIUM BROMIDE AND ALBUTEROL SULFATE 1 AMPULE: 2.5; .5 SOLUTION RESPIRATORY (INHALATION) at 19:42

## 2022-02-04 RX ADMIN — METHYLPREDNISOLONE SODIUM SUCCINATE 40 MG: 40 INJECTION, POWDER, LYOPHILIZED, FOR SOLUTION INTRAMUSCULAR; INTRAVENOUS at 11:58

## 2022-02-04 RX ADMIN — IBUPROFEN 400 MG: 400 TABLET, FILM COATED ORAL at 08:00

## 2022-02-04 RX ADMIN — ARFORMOTEROL TARTRATE 15 MCG: 15 SOLUTION RESPIRATORY (INHALATION) at 19:42

## 2022-02-04 RX ADMIN — IBUPROFEN 400 MG: 400 TABLET, FILM COATED ORAL at 11:59

## 2022-02-04 RX ADMIN — METHYLPREDNISOLONE SODIUM SUCCINATE 40 MG: 40 INJECTION, POWDER, LYOPHILIZED, FOR SOLUTION INTRAMUSCULAR; INTRAVENOUS at 03:33

## 2022-02-04 RX ADMIN — IPRATROPIUM BROMIDE AND ALBUTEROL SULFATE 1 AMPULE: 2.5; .5 SOLUTION RESPIRATORY (INHALATION) at 08:03

## 2022-02-04 RX ADMIN — SODIUM CHLORIDE: 9 INJECTION, SOLUTION INTRAVENOUS at 03:26

## 2022-02-04 RX ADMIN — IBUPROFEN 400 MG: 400 TABLET, FILM COATED ORAL at 16:37

## 2022-02-04 RX ADMIN — PANTOPRAZOLE SODIUM 40 MG: 40 TABLET, DELAYED RELEASE ORAL at 06:22

## 2022-02-04 RX ADMIN — FERROUS SULFATE TAB 325 MG (65 MG ELEMENTAL FE) 325 MG: 325 (65 FE) TAB at 08:00

## 2022-02-04 RX ADMIN — IPRATROPIUM BROMIDE AND ALBUTEROL SULFATE 1 AMPULE: 2.5; .5 SOLUTION RESPIRATORY (INHALATION) at 12:07

## 2022-02-04 RX ADMIN — ENOXAPARIN SODIUM 40 MG: 100 INJECTION SUBCUTANEOUS at 16:37

## 2022-02-04 RX ADMIN — METHYLPREDNISOLONE SODIUM SUCCINATE 40 MG: 40 INJECTION, POWDER, LYOPHILIZED, FOR SOLUTION INTRAMUSCULAR; INTRAVENOUS at 16:37

## 2022-02-04 RX ADMIN — FERROUS SULFATE TAB 325 MG (65 MG ELEMENTAL FE) 325 MG: 325 (65 FE) TAB at 16:37

## 2022-02-04 RX ADMIN — BUDESONIDE 250 MCG: 0.25 SUSPENSION RESPIRATORY (INHALATION) at 08:03

## 2022-02-04 RX ADMIN — ARFORMOTEROL TARTRATE 15 MCG: 15 SOLUTION RESPIRATORY (INHALATION) at 08:03

## 2022-02-04 RX ADMIN — BUDESONIDE 250 MCG: 0.25 SUSPENSION RESPIRATORY (INHALATION) at 19:42

## 2022-02-04 RX ADMIN — AZITHROMYCIN 500 MG: 250 TABLET, FILM COATED ORAL at 08:00

## 2022-02-04 ASSESSMENT — PAIN SCALES - GENERAL
PAINLEVEL_OUTOF10: 0
PAINLEVEL_OUTOF10: 1
PAINLEVEL_OUTOF10: 0
PAINLEVEL_OUTOF10: 0

## 2022-02-04 NOTE — CARE COORDINATION
Social work / Discharge Planning:       Plan is to discharge home when medically stable. Uses 4-5 liters of oxygen at home from [facility]. Currently on 6 liters and IV Solumedrol.    Electronically signed by MALVIN Schroeder on 2/4/2022 at 8:42 AM

## 2022-02-04 NOTE — PROGRESS NOTES
PROGRESS  NOTE --                                                          INTERNAL  MEDICINE                                                                              I  PERSONALLY SAW , EXAMINED, AND CARED [address], 2/4/2022     LABS, XRAY ,CHART, AND MEDICATIONS  REVIEWED BY ME       Chief complaint: Short of breath      2/3/2022-SUBJECTIVE: Karen Dick is alert awake and cooperative; oriented ×3. Denies any chest pain dyspnea nausea emesis. Tolerating diet. No abdominal pain. Sitting up in bed finishing his lunch. Throat feels better with lozenges. Less cough. I discussed at length coronavirus infection; advised him he does not have SARS-CoV-2. He states his wife has similar symptoms currently at home with sore throat minimal cough. Afebrile last 24 hours. SPO2 94 on 6 L nasal cannula. Intake and output -1000 cc. Fasting glucose 155 CRP 0.5 improving. Procalcitonin 0.11. A1c 5.4 TSH 0.858. WBC 11.1 hemoglobin 15.7. Iron 45, iron saturation 13, both low; TIBC normal ferritin 63. Q82 folic acid normal.  ESR one. Respiratory culture pending. Hemoglobin is pending. Molecular/PCR viral respiratory panel positive for coronavirus 229E. Legionella and strep antigen presumptive negative. ID consult from yesterday appreciated. He completed 2 years of treatment with ethambutol rifampin moxifloxacin which was changed to clarithromycin. 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 He never sterilized his respiratory cultures. He was offered Arikayce but declined due to out-of-pocket expenses. Now on suppressive azithromycin 500 mg three times weekly. 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 ID note from today unchanged. Pulmonary note from today wean steroids to 40 mg every 6 hours.      2/4/2022-sitting up in bed, finishing his nebulizer treatment. States he feels much better overall. No chest pain minimal cough sore throat has improved. Appetite improving. Afebrile last 24 hours. SPO2 89 on 6 L nasal cannula. Intake and output +1564 cc. Fasting glucose 128 CRP 0.3. ESR 0. Sputum Gram stain as follows--    Value: Group 6: <25 PMN's/LPF and <25 Epithelial cells/LPF   Moderate Polymorphonuclear leukocytes   Few Epithelial cells   Moderate Gram positive cocci in clusters   Moderate Gram positive diplococci   Moderate Gram positive rods      Blood cultures with following results--     2/1/22 1155   Culture, Blood 2  Abnormal   24 Hours no growth   Gram stain performed from blood culture bottle media   Gram positive cocci in clusters         ID note from yesterday appreciated; resume azithromycin 3 times weekly for suppressive MAC therapy. Pulmonary note from yesterday unchanged. ID note from today occasional cough tolerating antibiotics. Hemoglobin is pending; tetanus and pneumococcal antibodies pending. The above blood culture likely contaminant.       Objective:     PHYSICAL EXAM:    VS: /74   Pulse 64   Temp 96.2 °F (35.7 °C) (Oral)   Resp 20   Ht 6' 1\" (1.854 m)   Wt 186 lb 11.2 oz (84.7 kg)   SpO2 (!) 89%   BMI 24.63 kg/m²     Labs:   CBC:   Lab Results   Component Value Date    WBC 8.5 02/04/2022    RBC 5.03 02/04/2022    HGB 15.2 02/04/2022    HCT 46.2 02/04/2022    MCV 91.8 02/04/2022    MCH 30.2 02/04/2022    MCHC 32.9 02/04/2022    RDW 15.0 02/04/2022     02/04/2022    MPV 10.6 02/04/2022     CBC with Differential:    Lab Results   Component Value Date    WBC 8.5 02/04/2022    RBC 5.03 02/04/2022    HGB 15.2 02/04/2022    HCT 46.2 02/04/2022     02/04/2022    MCV 91.8 02/04/2022    MCH 30.2 02/04/2022    MCHC 32.9 02/04/2022    RDW 15.0 02/04/2022    NRBC 0.0 02/04/2022    LYMPHOPCT 3.5 02/04/2022    MONOPCT 0.9 02/04/2022    MYELOPCT 0.9 04/07/2021    BASOPCT 0.0 02/04/2022    MONOSABS 0.08 02/04/2022    LYMPHSABS 0.34 02/04/2022    EOSABS 0.00 02/04/2022    BASOSABS 0.00 02/04/2022     Hemoglobin/Hematocrit:    Lab Results   Component Value Date    HGB 15.2 02/04/2022    HCT 46.2 02/04/2022     CMP:    Lab Results   Component Value Date     02/04/2022    K 4.2 02/04/2022    K 4.0 02/01/2022     02/04/2022    CO2 22 02/04/2022    BUN 16 02/04/2022    CREATININE 0.7 02/04/2022    GFRAA >60 02/04/2022    LABGLOM >60 02/04/2022    GLUCOSE 128 02/04/2022    PROT 5.6 02/04/2022    LABALBU 3.6 02/04/2022    CALCIUM 9.3 02/04/2022    BILITOT 0.2 02/04/2022    ALKPHOS 62 02/04/2022    AST 16 02/04/2022    ALT 10 02/04/2022     BMP:    Lab Results   Component Value Date     02/04/2022    K 4.2 02/04/2022    K 4.0 02/01/2022     02/04/2022    CO2 22 02/04/2022    BUN 16 02/04/2022    LABALBU 3.6 02/04/2022    CREATININE 0.7 02/04/2022    CALCIUM 9.3 02/04/2022    GFRAA >60 02/04/2022    LABGLOM >60 02/04/2022    GLUCOSE 128 02/04/2022     Hepatic Function Panel:    Lab Results   Component Value Date    ALKPHOS 62 02/04/2022    ALT 10 02/04/2022    AST 16 02/04/2022    PROT 5.6 02/04/2022    BILITOT 0.2 02/04/2022    BILIDIR <0.2 02/04/2022    IBILI see below 02/04/2022    LABALBU 3.6 02/04/2022     Ionized Calcium:  No results found for: IONCA  Magnesium:    Lab Results   Component Value Date    MG 1.9 02/04/2022     Phosphorus:    Lab Results   Component Value Date    PHOS 2.7 02/04/2022     LDH:    Lab Results   Component Value Date     01/02/2021     Uric Acid:    Lab Results   Component Value Date    LABURIC 4.8 02/02/2022     PT/INR:    Lab Results   Component Value Date    PROTIME 12.5 05/30/2019    INR 1.1 05/30/2019     Warfarin PT/INR:  No components found for: PTPATWAR, PTINRWAR  PTT:    Lab Results   Component Value Date    APTT 31.4 05/30/2019   [APTT}  Troponin:    Lab Results   Component Value Date    TROPONINI <0.01 04/08/2021     Last 3 Troponin:    Lab Results Component Value Date    TROPONINI <0.01 04/08/2021    TROPONINI <0.01 04/08/2021    TROPONINI <0.01 01/02/2021     U/A:    Lab Results   Component Value Date    COLORU Yellow 02/01/2022    PROTEINU Negative 02/01/2022    PHUR 5.5 02/01/2022    LABCAST FEW 10/02/2019    WBCUA NONE 04/07/2021    RBCUA NONE 04/07/2021    MUCUS Present 04/07/2021    BACTERIA FEW 04/07/2021    CLARITYU Clear 02/01/2022    SPECGRAV 1.020 02/01/2022    LEUKOCYTESUR Negative 02/01/2022    UROBILINOGEN 0.2 02/01/2022    BILIRUBINUR MODERATE 02/01/2022    BLOODU Negative 02/01/2022    GLUCOSEU Negative 02/01/2022     HgBA1c:    Lab Results   Component Value Date    LABA1C 5.4 02/01/2022     FLP:    Lab Results   Component Value Date    TRIG 41 02/02/2022    HDL 61 02/02/2022    LDLCALC 72 02/02/2022    LABVLDL 8 02/02/2022     TSH:    Lab Results   Component Value Date    TSH 0.858 02/02/2022     VITAMIN B12: No components found for: B12  FOLATE:    Lab Results   Component Value Date    FOLATE 12.7 02/01/2022     IRON:    Lab Results   Component Value Date    IRON 45 02/02/2022     Iron Saturation:  No components found for: PERCENTFE  TIBC:    Lab Results   Component Value Date    TIBC 339 02/02/2022     FERRITIN:    Lab Results   Component Value Date    FERRITIN 63 02/02/2022        General appearance: Alert, Awake, Oriented times 3, no distress; nasal cannula oxygen in place  Skin: Warm and dry ; no rashes  Head: Normocephalic. No masses, lesions or tenderness noted  Eyes: Conjunctivae pink, sclera white. PERRL,EOM-I  Ears: External ears normal  Nose/Sinuses: Nares normal. Septum midline. Mucosa normal. No drainage  Oropharynx: Oropharynx clear with no exudate seen  Neck: Supple. No jugular venous distension, lymphadenopathy or thyromegaly Trachea midline  Lungs: Mostly clear  Heart: S1 S2  Regular rate and rhythm. No rub, murmur or gallop  Abdomen: Soft, non-tender.  BS normal. No masses, organomegaly; no rebound or guarding  Extremities: No edema, Peripheral pulses palpable  Musculoskeletal: Muscular strength appears intact. Neuro:  No focal motor defects ; II-XII grossly intact . 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)    Acute respiratory failure with hypoxia (HCC)    Coronavirus infection    Immunocompromised ([address]    Iron deficiency  Resolved Problems:    Thoracic ascending aortic aneurysm (HCC)    Acute hypoxemic respiratory failure (HCC)      PLAN:  SEE ORDERS      RE  CHANGES AND FINDINGS   Medications reviewed with patient  GI prophylaxis  DVT prophylaxis  Consultants notes reviewed  Ferrous sulfate 325 mg twice daily due to iron deficiency  Await results of respiratory culture and immunoglobin studies  Arformoterol 15 mcg twice daily  Budesonide 250 mcg twice daily  Cefepime and doxycycline have been discontinued  DuoNeb every 4 hours while awake  Methylprednisolone decreased to 40 mg IV every 6 hours  Oxycodone 7.5 mg every 12 hours as needed  Continue arformoterol budesonide and DuoNeb  Azithromycin 800 mg Monday Wednesday Friday  Ibuprofen 400 mg 3 times daily  Methylprednisolone 40 mg IV every 6 hours  Percocet 7.5 mg twice daily as needed  Await immunoglobin's, tetanus and pneumococcal antibodies  Monitor labs    Discussed with patient and nursing. [address]     2:55 PM     2/4/2022    TIME > 25 MINUTES    >  50 %  OF  TIME  DISCUSSION               ------------  INFORMATION  -----------      DIET:ADULT DIET;  Regular      No Known Allergies      MEDICATION SIDE EFFECTS:none       SCHEDULED MEDS:                                 Scheduled Meds:   methylPREDNISolone  40 mg IntraVENous Q6H    ferrous sulfate  325 mg Oral BID WC    azithromycin  500 mg Oral Once per day on Mon Wed Fri    ibuprofen  400 mg Oral TID     Arformoterol Tartrate  15 mcg Nebulization BID    budesonide  250 mcg Nebulization BID    ipratropium-albuterol  1 ampule Inhalation Q4H WA    enoxaparin  40 mg SubCUTAneous Daily    pantoprazole  40 mg Oral QAM AC       Continuous Infusions:   sodium chloride 75 mL/hr at 02/04/22 0326         Data:       Intake/Output Summary (Last 24 hours) at 2/4/2022 1455  Last data filed at 2/4/2022 9068  Gross per 24 hour   Intake 3813.77 ml   Output 1250 ml   Net 2563.77 ml       Wt Readings from Last 3 Encounters:   02/02/22 186 lb 11.2 oz (84.7 kg)   01/18/22 186 lb (84.4 kg)   01/13/22 184 lb (83.5 kg)       Labs: Additional    GLUCOSE:No results for input(s): POCGLU in the last 72 hours. BNP:No results found for: BNP    CRP:   Recent Labs     02/02/22  0557 02/03/22  0237 02/04/22  0000   CRP 1.2* 0.5* 0.3       ESR:  Recent Labs     02/02/22  0557 02/03/22  0237 02/04/22  0000   SEDRATE 2 1 0       RADIOLOGY: REVIEWED AVAILABLE REPORT  XR CHEST PORTABLE   Final Result   1. Advanced emphysematous changes. 2. Patchy bibasilar opacities which could represent chronic scarring or   possibly pneumonia   3. Chronic elevation of the right hemidiaphragm with pleuroparenchymal   scarring within the right lung base.                    [address]    2:55 PM     2/4/2022      Voice recognition software used for dictation

## 2022-02-04 NOTE — PROGRESS NOTES
Pulmonary Progress Note    Admit Date: 2022  Hospital day                               PCP: Taryn Theodore MD    Chief Complaint (s):  Patient Active Problem List   Diagnosis    COPD (chronic obstructive pulmonary disease) ([address]    Bullous emphysema ([address]    Pulmonary emphysema with fibrosis of lung ([address]    S/P lumbar fusion    Hypophosphatemia    Glucose intolerance    Hilar adenopathy    Pulmonary Mycobacterium avium complex (MAC) infection ([address]    Acute on chronic respiratory failure with hypoxia ([address]    Chronic respiratory failure with hypoxia ([address]    Erythrocytosis due to hypoxemia    Chronic pain syndrome    Lumbar degenerative disc disease    Chronic low back pain    Acute respiratory failure with hypoxia (HCC)    Coronavirus infection    Immunocompromised ([address]    Iron deficiency       Subjective:  · Seen this p.m., the patient is resting comfortably on nasal cannula oxygen. No overt respiratory distress. Vitals:  VITALS:  /74   Pulse 64   Temp 96.2 °F (35.7 °C) (Oral)   Resp 20   Ht 6' 1\" (1.854 m)   Wt 186 lb 11.2 oz (84.7 kg)   SpO2 95%   BMI 24.63 kg/m²     24HR INTAKE/OUTPUT:      Intake/Output Summary (Last 24 hours) at 2022 1710  Last data filed at 2022 2450  Gross per 24 hour   Intake 750 ml   Output 1250 ml   Net -500 ml       24HR PULSE OXIMETRY RANGE:    SpO2  Av %  Min: 89 %  Max: 95 %    Medications:  IV:   sodium chloride 75 mL/hr at 22 0326       Scheduled Meds:   methylPREDNISolone  40 mg IntraVENous Q6H    ferrous sulfate  325 mg Oral BID WC    azithromycin  500 mg Oral Once per day on     ibuprofen  400 mg Oral TID     Arformoterol Tartrate  15 mcg Nebulization BID    budesonide  250 mcg Nebulization BID    ipratropium-albuterol  1 ampule Inhalation Q4H WA    enoxaparin  40 mg SubCUTAneous Daily    pantoprazole  40 mg Oral QAM AC       Diet:   ADULT DIET;  Regular     EXAM:  General: No distress. Resting comfortably  Eyes: PERRL. No sclera icterus. No conjunctival injection. ENT: No discharge. Pharynx clear. Neck: Trachea midline. Normal thyroid. Resp: No accessory muscle use. no rales. no wheezing. no rhonchi. CV: Regular rate. Regular rhythm. No murmur or rub. Abd: Non-tender. Non-distended. No masses. No organomegaly. Normal bowel sounds. Skin: Warm and dry. No nodule on exposed extremities. No rash on exposed extremities. Ext: No cyanosis, clubbing, edema  Lymph: No cervical LAD. No supraclavicular LAD. M/S: No cyanosis. No joint deformity. No clubbing. Neuro: Positive pupils/gag/corneals. Normal pain response. Results:  CBC:   Recent Labs     02/02/22  0557 02/03/22  0237 02/04/22  0000   WBC 4.8 11.1 8.5   HGB 17.3* 15.7 15.2   HCT 51.6 47.3 46.2   MCV 90.1 91.1 91.8    181 169     BMP:   Recent Labs     02/02/22  0557 02/03/22  0237 02/04/22  0000   * 137 137   K 4.3 4.7 4.2    104 104   CO2 20* 22 22   PHOS 2.8 2.7 2.7   BUN 14 16 16   CREATININE 0.8 0.7 0.7     LIVER PROFILE:   Recent Labs     02/02/22  0557 02/03/22  0237 02/04/22  0000   AST 15 18 16   ALT 10 10 10   BILIDIR <0.2 <0.2 <0.2   BILITOT 0.5 0.2 0.2   ALKPHOS 63 62 62     PT/INR: No results for input(s): PROTIME, INR in the last 72 hours. APTT: No results for input(s): APTT in the last 72 hours. Pathology:  1. N/A      Microbiology:  1. None    Recent ABG:   No results for input(s): PH, PO2, PCO2, HCO3, BE, O2SAT, METHB, O2HB, COHB, O2CON, HHB, THB in the last 72 hours. Recent Films:  XR CHEST PORTABLE   Final Result   1. Advanced emphysematous changes. 2. Patchy bibasilar opacities which could represent chronic scarring or   possibly pneumonia   3. Chronic elevation of the right hemidiaphragm with pleuroparenchymal   scarring within the right lung base. Assessment:  1. Acute Exacerbation COPD gold class 4.   2. Coronavirus 229E.    3. Mycobacterium avium complex      Plan:  1. Wean steroids to solumedrol 40 mg every 12 hours  2. Continue nebulizers to treat COPD  3. Wean oxygen to baseline of 3L as tolerated  4. Respiratory culture pending: Looks like OP tonja  5. Hopefully discharge soon    Time at the bedside, reviewing labs and radiographs, reviewing updated notes and consultations, discussing with staff and family was more than 35 minutes. Please note that voice recognition technology was used in the preparation of this note and make therefore it may contain inadvertent transcription errors. If the patient is a COVID 19 isolation patient, the above physical exam reflects that of the examining physician for the day.         Malik Nicole MD.    [facility], [address] [place] [address], [address] [place] [address]

## 2022-02-04 NOTE — PROGRESS NOTES
[id number] [place] [address] [facility]  [facility]  Progress Note    SUBJECTIVE:  Chief Complaint   Patient presents with    Shortness of Breath     hx copd, hypoxic on home 3L [place], cough, headache, bodyaches, x3 days     Patient is sitting up in bed, says he feels better today than yesterday   On 6LNC, endorsing some dyspnea on exertion   Occasional cough   No fevers  Tolerated oral antibiotic      Review of systems:  As stated above in the chief complaint, otherwise negative. Medications:  Scheduled Meds:   methylPREDNISolone  40 mg IntraVENous Q6H    ferrous sulfate  325 mg Oral BID     azithromycin  500 mg Oral Once per day on     ibuprofen  400 mg Oral TID     Arformoterol Tartrate  15 mcg Nebulization BID    budesonide  250 mcg Nebulization BID    ipratropium-albuterol  1 ampule Inhalation Q4H WA    enoxaparin  40 mg SubCUTAneous Daily    pantoprazole  40 mg Oral QAM AC     Continuous Infusions:   sodium chloride 75 mL/hr at 22 0326     PRN Meds:benzocaine-menthol, guaiFENesin-dextromethorphan, ondansetron, sodium chloride, phenylephrine, acetaminophen, oxyCODONE-acetaminophen **AND** oxyCODONE, benzonatate    OBJECTIVE:  /81   Pulse 80   Temp 97.7 °F (36.5 °C) (Oral)   Resp 21   Ht 6' 1\" (1.854 m)   Wt 186 lb 11.2 oz (84.7 kg)   SpO2 90%   BMI 24.63 kg/m²   Temp  Av °F (36.7 °C)  Min: 97.7 °F (36.5 °C)  Max: 98.2 °F (36.8 °C)  Constitutional: The patient is awake, alert, and oriented. Sitting up in the bed, in no distress. Skin: Warm and dry. No rashes were noted. HEENT: Round and reactive pupils. Moist mucous membranes. No ulcerations or thrush. Neck: Supple to movements. Chest: No respiratory distress. Symmetrical expansion. 6LNC. Diminished, some bibasilar crackles. Cardiovascular: S1 and S2 are rhythmic and regular. No murmurs appreciated. Abdomen: Positive bowel sounds to auscultation. Benign to palpation. No masses felt.   Extremities: No edema. Lines: peripheral    Laboratory and Tests Review:  Lab Results   Component Value Date    WBC 8.5 02/04/2022    WBC 11.1 02/03/2022    WBC 4.8 02/02/2022    HGB 15.2 02/04/2022    HCT 46.2 02/04/2022    MCV 91.8 02/04/2022     02/04/2022     Lab Results   Component Value Date    NEUTROABS 8.16 (H) 02/04/2022    NEUTROABS 10.42 (H) 02/03/2022    NEUTROABS 4.36 02/02/2022     No results found for: [facility]  Lab Results   Component Value Date    ALT 10 02/04/2022    AST 16 02/04/2022    ALKPHOS 62 02/04/2022    BILITOT 0.2 02/04/2022     Lab Results   Component Value Date     02/04/2022    K 4.2 02/04/2022    K 4.0 02/01/2022     02/04/2022    CO2 22 02/04/2022    BUN 16 02/04/2022    CREATININE 0.7 02/04/2022    CREATININE 0.7 02/03/2022    CREATININE 0.8 02/02/2022    GFRAA >60 02/04/2022    LABGLOM >60 02/04/2022    GLUCOSE 128 02/04/2022    PROT 5.6 02/04/2022    LABALBU 3.6 02/04/2022    CALCIUM 9.3 02/04/2022    BILITOT 0.2 02/04/2022    ALKPHOS 62 02/04/2022    AST 16 02/04/2022    ALT 10 02/04/2022     Lab Results   Component Value Date    CRP 0.3 02/04/2022    CRP 0.5 (H) 02/03/2022    CRP 1.2 (H) 02/02/2022     Lab Results   Component Value Date    SEDRATE 0 02/04/2022    SEDRATE 1 02/03/2022    SEDRATE 2 02/02/2022     Radiology:  Noted     Microbiology:   Blood cultures: negative so far  Streptococcus pneumoniae/Legionella urine Ag: negative  Respiratory Viral Panel: Coronavirus 229E  Respiratory Culture: pending - g/s showing moderate GPC in clusters, moderate GPD, moderate GPR    Recent Labs     02/01/22  1740   PROCAL 0.11*        Assessment:  · MAC infection of the lung. Treated with 3 antimycobacterial agents for 2 years.   Now on suppressive Azithromycin 500 mg p.o. 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 I had a face to face encounter with the patient. Agree with exam.  Assessment and plan as outlined above and directed by me. Addition and corrections were done as deemed appropriate. My exam and plan include: Patient is doing well. He is still on 6 L nasal cannula. He is back on Azithromycin 3 times weekly. Continue supportive treatment. Check IgG and tetanus/pneumococcal antibodies.     Erin Lee MD  2/4/2022  11:56 AM

## 2022-02-05 LAB
ALBUMIN SERPL-MCNC: 3.6 G/DL (ref 3.5–5.2)
ALP BLD-CCNC: 55 U/L (ref 40–129)
ALT SERPL-CCNC: 11 U/L (ref 0–40)
ANION GAP SERPL CALCULATED.3IONS-SCNC: 9 MMOL/L (ref 7–16)
AST SERPL-CCNC: 18 U/L (ref 0–39)
BASOPHILS ABSOLUTE: 0 E9/L (ref 0–0.2)
BASOPHILS RELATIVE PERCENT: 0 % (ref 0–2)
BILIRUB SERPL-MCNC: 0.4 MG/DL (ref 0–1.2)
BILIRUBIN DIRECT: <0.2 MG/DL (ref 0–0.3)
BILIRUBIN, INDIRECT: ABNORMAL MG/DL (ref 0–1)
BUN BLDV-MCNC: 16 MG/DL (ref 6–23)
C-REACTIVE PROTEIN: 0.3 MG/DL (ref 0–0.4)
CALCIUM SERPL-MCNC: 9.4 MG/DL (ref 8.6–10.2)
CHLORIDE BLD-SCNC: 101 MMOL/L (ref 98–107)
CO2: 25 MMOL/L (ref 22–29)
CREAT SERPL-MCNC: 0.6 MG/DL (ref 0.7–1.2)
CULTURE, RESPIRATORY: NORMAL
EOSINOPHILS ABSOLUTE: 0 E9/L (ref 0.05–0.5)
EOSINOPHILS RELATIVE PERCENT: 0 % (ref 0–6)
GFR AFRICAN AMERICAN: >60
GFR NON-AFRICAN AMERICAN: >60 ML/MIN/1.73
GLUCOSE BLD-MCNC: 105 MG/DL (ref 74–99)
HCT VFR BLD CALC: 46.3 % (ref 37–54)
HEMOGLOBIN: 15.2 G/DL (ref 12.5–16.5)
IGA: 218 MG/DL (ref 70–400)
IGG: 675 MG/DL (ref 700–1600)
IGM: 84 MG/DL (ref 40–230)
IMMATURE GRANULOCYTES #: 0.04 E9/L
IMMATURE GRANULOCYTES %: 0.6 % (ref 0–5)
LYMPHOCYTES ABSOLUTE: 0.6 E9/L (ref 1.5–4)
LYMPHOCYTES RELATIVE PERCENT: 9.1 % (ref 20–42)
MAGNESIUM: 1.9 MG/DL (ref 1.6–2.6)
MCH RBC QN AUTO: 30 PG (ref 26–35)
MCHC RBC AUTO-ENTMCNC: 32.8 % (ref 32–34.5)
MCV RBC AUTO: 91.5 FL (ref 80–99.9)
MONOCYTES ABSOLUTE: 0.41 E9/L (ref 0.1–0.95)
MONOCYTES RELATIVE PERCENT: 6.2 % (ref 2–12)
NEUTROPHILS ABSOLUTE: 5.55 E9/L (ref 1.8–7.3)
NEUTROPHILS RELATIVE PERCENT: 84.1 % (ref 43–80)
PDW BLD-RTO: 14.6 FL (ref 11.5–15)
PHOSPHORUS: 2.8 MG/DL (ref 2.5–4.5)
PLATELET # BLD: 160 E9/L (ref 130–450)
PMV BLD AUTO: 10.1 FL (ref 7–12)
POTASSIUM SERPL-SCNC: 3.9 MMOL/L (ref 3.5–5)
RBC # BLD: 5.06 E12/L (ref 3.8–5.8)
SEDIMENTATION RATE, ERYTHROCYTE: 0 MM/HR (ref 0–15)
SMEAR, RESPIRATORY: NORMAL
SODIUM BLD-SCNC: 135 MMOL/L (ref 132–146)
TOTAL PROTEIN: 5.7 G/DL (ref 6.4–8.3)
WBC # BLD: 6.6 E9/L (ref 4.5–11.5)

## 2022-02-05 PROCEDURE — 94640 AIRWAY INHALATION TREATMENT: CPT

## 2022-02-05 PROCEDURE — 6360000002 HC RX W HCPCS: Performed by: INTERNAL MEDICINE

## 2022-02-05 PROCEDURE — 84100 ASSAY OF PHOSPHORUS: CPT

## 2022-02-05 PROCEDURE — 2580000003 HC RX 258: Performed by: INTERNAL MEDICINE

## 2022-02-05 PROCEDURE — 80048 BASIC METABOLIC PNL TOTAL CA: CPT

## 2022-02-05 PROCEDURE — 6370000000 HC RX 637 (ALT 250 FOR IP): Performed by: INTERNAL MEDICINE

## 2022-02-05 PROCEDURE — 2060000000 HC ICU INTERMEDIATE R&B

## 2022-02-05 PROCEDURE — 83735 ASSAY OF MAGNESIUM: CPT

## 2022-02-05 PROCEDURE — 85025 COMPLETE CBC W/AUTO DIFF WBC: CPT

## 2022-02-05 PROCEDURE — 36415 COLL VENOUS BLD VENIPUNCTURE: CPT

## 2022-02-05 PROCEDURE — 2700000000 HC OXYGEN THERAPY PER DAY

## 2022-02-05 PROCEDURE — 85651 RBC SED RATE NONAUTOMATED: CPT

## 2022-02-05 PROCEDURE — 86140 C-REACTIVE PROTEIN: CPT

## 2022-02-05 PROCEDURE — 80076 HEPATIC FUNCTION PANEL: CPT

## 2022-02-05 RX ADMIN — SODIUM CHLORIDE: 9 INJECTION, SOLUTION INTRAVENOUS at 04:01

## 2022-02-05 RX ADMIN — IBUPROFEN 400 MG: 400 TABLET, FILM COATED ORAL at 09:36

## 2022-02-05 RX ADMIN — METHYLPREDNISOLONE SODIUM SUCCINATE 40 MG: 40 INJECTION, POWDER, LYOPHILIZED, FOR SOLUTION INTRAMUSCULAR; INTRAVENOUS at 05:02

## 2022-02-05 RX ADMIN — ARFORMOTEROL TARTRATE 15 MCG: 15 SOLUTION RESPIRATORY (INHALATION) at 09:01

## 2022-02-05 RX ADMIN — BUDESONIDE 250 MCG: 0.25 SUSPENSION RESPIRATORY (INHALATION) at 19:52

## 2022-02-05 RX ADMIN — ARFORMOTEROL TARTRATE 15 MCG: 15 SOLUTION RESPIRATORY (INHALATION) at 19:52

## 2022-02-05 RX ADMIN — BUDESONIDE 250 MCG: 0.25 SUSPENSION RESPIRATORY (INHALATION) at 09:00

## 2022-02-05 RX ADMIN — FERROUS SULFATE TAB 325 MG (65 MG ELEMENTAL FE) 325 MG: 325 (65 FE) TAB at 09:36

## 2022-02-05 RX ADMIN — IPRATROPIUM BROMIDE AND ALBUTEROL SULFATE 1 AMPULE: 2.5; .5 SOLUTION RESPIRATORY (INHALATION) at 13:52

## 2022-02-05 RX ADMIN — FERROUS SULFATE TAB 325 MG (65 MG ELEMENTAL FE) 325 MG: 325 (65 FE) TAB at 16:46

## 2022-02-05 RX ADMIN — IPRATROPIUM BROMIDE AND ALBUTEROL SULFATE 1 AMPULE: 2.5; .5 SOLUTION RESPIRATORY (INHALATION) at 19:52

## 2022-02-05 RX ADMIN — IBUPROFEN 400 MG: 400 TABLET, FILM COATED ORAL at 13:00

## 2022-02-05 RX ADMIN — ENOXAPARIN SODIUM 40 MG: 100 INJECTION SUBCUTANEOUS at 16:46

## 2022-02-05 RX ADMIN — METHYLPREDNISOLONE SODIUM SUCCINATE 40 MG: 40 INJECTION, POWDER, LYOPHILIZED, FOR SOLUTION INTRAMUSCULAR; INTRAVENOUS at 16:47

## 2022-02-05 RX ADMIN — PANTOPRAZOLE SODIUM 40 MG: 40 TABLET, DELAYED RELEASE ORAL at 05:02

## 2022-02-05 RX ADMIN — IBUPROFEN 400 MG: 400 TABLET, FILM COATED ORAL at 16:46

## 2022-02-05 RX ADMIN — IPRATROPIUM BROMIDE AND ALBUTEROL SULFATE 1 AMPULE: 2.5; .5 SOLUTION RESPIRATORY (INHALATION) at 09:00

## 2022-02-05 RX ADMIN — IPRATROPIUM BROMIDE AND ALBUTEROL SULFATE 1 AMPULE: 2.5; .5 SOLUTION RESPIRATORY (INHALATION) at 16:29

## 2022-02-05 ASSESSMENT — PAIN SCALES - GENERAL
PAINLEVEL_OUTOF10: 3
PAINLEVEL_OUTOF10: 3
PAINLEVEL_OUTOF10: 0
PAINLEVEL_OUTOF10: 2
PAINLEVEL_OUTOF10: 0

## 2022-02-05 NOTE — PROGRESS NOTES
PROGRESS  NOTE --                                                          INTERNAL  MEDICINE                                                                              I  PERSONALLY SAW , EXAMINED, AND CARED [address], 2/5/2022     LABS, XRAY ,CHART, AND MEDICATIONS  REVIEWED BY ME       Chief complaint: Short of breath      2/3/2022-SUBJECTIVE: Moisés Ham is alert awake and cooperative; oriented ×3. Denies any chest pain dyspnea nausea emesis. Tolerating diet. No abdominal pain. Sitting up in bed finishing his lunch. Throat feels better with lozenges. Less cough. I discussed at length coronavirus infection; advised him he does not have SARS-CoV-2. He states his wife has similar symptoms currently at home with sore throat minimal cough. Afebrile last 24 hours. SPO2 94 on 6 L nasal cannula. Intake and output -1000 cc. Fasting glucose 155 CRP 0.5 improving. Procalcitonin 0.11. A1c 5.4 TSH 0.858. WBC 11.1 hemoglobin 15.7. Iron 45, iron saturation 13, both low; TIBC normal ferritin 63. W23 folic acid normal.  ESR one. Respiratory culture pending. Hemoglobin is pending. Molecular/PCR viral respiratory panel positive for coronavirus 229E. Legionella and strep antigen presumptive negative. ID consult from yesterday appreciated. He completed 2 years of treatment with ethambutol rifampin moxifloxacin which was changed to clarithromycin. 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 He never sterilized his respiratory cultures. He was offered Arikayce but declined due to out-of-pocket expenses. Now on suppressive azithromycin 500 mg three times weekly. 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 ID note from today unchanged. Pulmonary note from today wean steroids to 40 mg every 6 hours.      2/4/2022-sitting up in bed, finishing his nebulizer treatment. States he feels much better overall. No chest pain minimal cough sore throat has improved. Appetite improving. Afebrile last 24 hours. SPO2 89 on 6 L nasal cannula. Intake and output +1564 cc. Fasting glucose 128 CRP 0.3. ESR 0. Sputum Gram stain as follows--    Value: Group 6: <25 PMN's/LPF and <25 Epithelial cells/LPF   Moderate Polymorphonuclear leukocytes   Few Epithelial cells   Moderate Gram positive cocci in clusters   Moderate Gram positive diplococci   Moderate Gram positive rods      Blood cultures with following results--     2/1/22 1155   Culture, Blood 2  Abnormal   24 Hours no growth   Gram stain performed from blood culture bottle media   Gram positive cocci in clusters         ID note from yesterday appreciated; resume azithromycin 3 times weekly for suppressive MAC therapy. Pulmonary note from yesterday unchanged. ID note from today occasional cough tolerating antibiotics. Hemoglobin is pending; tetanus and pneumococcal antibodies pending. The above blood culture likely contaminant. 2/5/2022-patient sitting in bedside chair; no chest pain still has some tightness from his breathing problems. He notices he is urinating frequently at this time; has been consuming normal amounts of food and water. Still becomes short of breath with activity. No diarrhea no nausea. Afebrile last 24 hours. SPO2 90 on 6 L nasal cannula. Intake and output +1119 cc. Fasting glucose 105. CRP 0.3. Hemoglobin 15.2 WC 6.6. Pulmonary note from yesterday appreciated. Wean steroids to 40 mg IV every 12 hours continue nebulizers. Hopefully discharge soon. ID note from today, feeling a little better still some cough brownish-yellow sputum. Continue suppressive azithromycin. IgG IgA IgM IgE all pending; tetanus and pneumococcal antibodies pending.         Objective:     PHYSICAL EXAM:    VS: /74   Pulse 75   Temp 97.5 °F (36.4 °C)   Resp 18   Ht 6' 1\" (1.854 m)   Wt 197 lb 14.4 oz (89.8 kg)   SpO2 90%   BMI 26.11 kg/m²     Labs:   CBC:   Lab Results   Component Value Date    WBC 6.6 02/05/2022    RBC 5.06 02/05/2022    HGB 15.2 02/05/2022    HCT 46.3 02/05/2022    MCV 91.5 02/05/2022    MCH 30.0 02/05/2022    MCHC 32.8 02/05/2022    RDW 14.6 02/05/2022     02/05/2022    MPV 10.1 02/05/2022     CBC with Differential:    Lab Results   Component Value Date    WBC 6.6 02/05/2022    RBC 5.06 02/05/2022    HGB 15.2 02/05/2022    HCT 46.3 02/05/2022     02/05/2022    MCV 91.5 02/05/2022    MCH 30.0 02/05/2022    MCHC 32.8 02/05/2022    RDW 14.6 02/05/2022    NRBC 0.0 02/04/2022    LYMPHOPCT 9.1 02/05/2022    MONOPCT 6.2 02/05/2022    MYELOPCT 0.9 04/07/2021    BASOPCT 0.0 02/05/2022    MONOSABS 0.41 02/05/2022    LYMPHSABS 0.60 02/05/2022    EOSABS 0.00 02/05/2022    BASOSABS 0.00 02/05/2022     Hemoglobin/Hematocrit:    Lab Results   Component Value Date    HGB 15.2 02/05/2022    HCT 46.3 02/05/2022     CMP:    Lab Results   Component Value Date     02/05/2022    K 3.9 02/05/2022    K 4.0 02/01/2022     02/05/2022    CO2 25 02/05/2022    BUN 16 02/05/2022    CREATININE 0.6 02/05/2022    GFRAA >60 02/05/2022    LABGLOM >60 02/05/2022    GLUCOSE 105 02/05/2022    PROT 5.7 02/05/2022    LABALBU 3.6 02/05/2022    CALCIUM 9.4 02/05/2022    BILITOT 0.4 02/05/2022    ALKPHOS 55 02/05/2022    AST 18 02/05/2022    ALT 11 02/05/2022     BMP:    Lab Results   Component Value Date     02/05/2022    K 3.9 02/05/2022    K 4.0 02/01/2022     02/05/2022    CO2 25 02/05/2022    BUN 16 02/05/2022    LABALBU 3.6 02/05/2022    CREATININE 0.6 02/05/2022    CALCIUM 9.4 02/05/2022    GFRAA >60 02/05/2022    LABGLOM >60 02/05/2022    GLUCOSE 105 02/05/2022     Hepatic Function Panel:    Lab Results   Component Value Date    ALKPHOS 55 02/05/2022    ALT 11 02/05/2022    AST 18 02/05/2022    PROT 5.7 02/05/2022    BILITOT 0.4 02/05/2022    BILIDIR <0.2 02/05/2022    IBILI see below 02/05/2022    LABALBU 3.6 02/05/2022     Ionized Calcium:  No results found for: IONCA  Magnesium:    Lab Results   Component Value Date    MG 1.9 02/05/2022     Phosphorus:    Lab Results   Component Value Date    PHOS 2.8 02/05/2022     LDH:    Lab Results   Component Value Date     01/02/2021     Uric Acid:    Lab Results   Component Value Date    LABURIC 4.8 02/02/2022     PT/INR:    Lab Results   Component Value Date    PROTIME 12.5 05/30/2019    INR 1.1 05/30/2019     Warfarin PT/INR:  No components found for: PTPATWAR, PTINRWAR  PTT:    Lab Results   Component Value Date    APTT 31.4 05/30/2019   [APTT}  Troponin:    Lab Results   Component Value Date    TROPONINI <0.01 04/08/2021     Last 3 Troponin:    Lab Results   Component Value Date    TROPONINI <0.01 04/08/2021    TROPONINI <0.01 04/08/2021    TROPONINI <0.01 01/02/2021     U/A:    Lab Results   Component Value Date    COLORU Yellow 02/01/2022    PROTEINU Negative 02/01/2022    PHUR 5.5 02/01/2022    LABCAST FEW 10/02/2019    WBCUA NONE 04/07/2021    RBCUA NONE 04/07/2021    MUCUS Present 04/07/2021    BACTERIA FEW 04/07/2021    CLARITYU Clear 02/01/2022    SPECGRAV 1.020 02/01/2022    LEUKOCYTESUR Negative 02/01/2022    UROBILINOGEN 0.2 02/01/2022    BILIRUBINUR MODERATE 02/01/2022    BLOODU Negative 02/01/2022    GLUCOSEU Negative 02/01/2022     HgBA1c:    Lab Results   Component Value Date    LABA1C 5.4 02/01/2022     FLP:    Lab Results   Component Value Date    TRIG 41 02/02/2022    HDL 61 02/02/2022    LDLCALC 72 02/02/2022    LABVLDL 8 02/02/2022     TSH:    Lab Results   Component Value Date    TSH 0.858 02/02/2022     VITAMIN B12: No components found for: B12  FOLATE:    Lab Results   Component Value Date    FOLATE 12.7 02/01/2022     IRON:    Lab Results   Component Value Date    IRON 45 02/02/2022     Iron Saturation:  No components found for: PERCENTFE  TIBC:    Lab Results   Component Value Date    TIBC 339 02/02/2022 FERRITIN:    Lab Results   Component Value Date    FERRITIN 63 02/02/2022        General appearance: Alert, Awake, Oriented times 3, no distress; nasal cannula oxygen in place  Skin: Warm and dry ; no rashes  Head: Normocephalic. No masses, lesions or tenderness noted  Eyes: Conjunctivae pink, sclera white. PERRL,EOM-I  Ears: External ears normal  Nose/Sinuses: Nares normal. Septum midline. Mucosa normal. No drainage  Oropharynx: Oropharynx clear with no exudate seen  Neck: Supple. No jugular venous distension, lymphadenopathy or thyromegaly Trachea midline  Lungs: Mostly clear  Heart: S1 S2  Regular rate and rhythm. No rub, murmur or gallop  Abdomen: Soft, non-tender. BS normal. No masses, organomegaly; no rebound or guarding  Extremities: No edema, Peripheral pulses palpable  Musculoskeletal: Muscular strength appears intact. Neuro:  No focal motor defects ; II-XII grossly intact .  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)    Acute respiratory failure with hypoxia (HCC)    Coronavirus infection    Immunocompromised ([facility]    Iron deficiency  Resolved Problems:    Thoracic ascending aortic aneurysm ([facility]    Acute hypoxemic respiratory failure (HCC)      PLAN:  SEE ORDERS      RE  CHANGES AND FINDINGS   Medications reviewed with patient  GI prophylaxis  DVT prophylaxis  Consultants notes reviewed  Ferrous sulfate 325 mg twice daily due to iron deficiency  Await results of respiratory culture and immunoglobin studies  Arformoterol 15 mcg twice daily  Budesonide 250 mcg twice daily  Cefepime and doxycycline have been discontinued  DuoNeb every 4 hours while awake  Methylprednisolone decreased to 40 mg IV every 6 hours  Oxycodone 7.5 mg every 12 hours as needed  Continue arformoterol budesonide and DuoNeb  Azithromycin 500 mg Monday Wednesday Friday  Ibuprofen 400 mg 3 times daily  Methylprednisolone 40 mg IV every 6 hours  Percocet 7.5 mg twice daily as needed  Await immunoglobin's, tetanus and pneumococcal antibodies  Monitor labs  2/5/2022  Azithromycin 500 mg Monday Wednesday Friday  Arformoterol 15 mcg twice daily  Budesonide 250 mcg twice daily  DuoNeb every 4 hours while awake  Methylprednisolone 40 mg IV every 12 hours  0.9 sodium chloride 75 cc an hour will be discontinued since oral intake is adequate  Percocet 7.5, every 12 hours as needed for severe pain      Discussed with patient and nursing. Damita Habermann Mihok, DO     11:24 AM     2/5/2022    TIME > 25 MINUTES    >  50 %  OF  TIME  DISCUSSION               ------------  INFORMATION  -----------      DIET:ADULT DIET; Regular      No Known Allergies      MEDICATION SIDE EFFECTS:none       SCHEDULED MEDS:                                 Scheduled Meds:   methylPREDNISolone  40 mg IntraVENous Q12H    ferrous sulfate  325 mg Oral BID     azithromycin  500 mg Oral Once per day on Mon Wed Fri    ibuprofen  400 mg Oral TID     Arformoterol Tartrate  15 mcg Nebulization BID    budesonide  250 mcg Nebulization BID    ipratropium-albuterol  1 ampule Inhalation Q4H WA    enoxaparin  40 mg SubCUTAneous Daily    pantoprazole  40 mg Oral QAM AC       Continuous Infusions:   sodium chloride 75 mL/hr at 02/05/22 0401         Data:       Intake/Output Summary (Last 24 hours) at 2/5/2022 1124  Last data filed at 2/5/2022 0751  Gross per 24 hour   Intake 280 ml   Output 725 ml   Net -445 ml       Wt Readings from Last 3 Encounters:   02/05/22 197 lb 14.4 oz (89.8 kg)   01/18/22 186 lb (84.4 kg)   01/13/22 184 lb (83.5 kg)       Labs: Additional    GLUCOSE:No results for input(s): POCGLU in the last 72 hours.     BNP:No results found for: BNP    CRP:   Recent Labs     02/03/22  5036 02/04/22  0000 02/05/22  0407   CRP 0.5* 0.3 0.3       ESR:  Recent Labs     02/03/22  0237 02/04/22  0000 02/05/22  0407   SEDRATE 1 0 0       RADIOLOGY: REVIEWED AVAILABLE REPORT  XR CHEST PORTABLE   Final Result   1. Advanced emphysematous changes. 2. Patchy bibasilar opacities which could represent chronic scarring or   possibly pneumonia   3. Chronic elevation of the right hemidiaphragm with pleuroparenchymal   scarring within the right lung base.                    [address]    11:24 AM     2/5/2022      Voice recognition software used for dictation

## 2022-02-05 NOTE — PROGRESS NOTES
[facility]  [address], [address], [address]      Pulmonary Progress Note      SUBJECTIVE:  Sitting up in bed talking on phone, claims doing ok with breathing, ambulating some in room and tolerating. Currently on 6 li NC, claims desaturated when dropped down to 4 li yesterday.     OBJECTIVE    Medications    Continuous Infusions:    Scheduled Meds:   methylPREDNISolone  40 mg IntraVENous Q12H    ferrous sulfate  325 mg Oral BID WC    azithromycin  500 mg Oral Once per day on     ibuprofen  400 mg Oral TID WC    Arformoterol Tartrate  15 mcg Nebulization BID    budesonide  250 mcg Nebulization BID    ipratropium-albuterol  1 ampule Inhalation Q4H WA    enoxaparin  40 mg SubCUTAneous Daily    pantoprazole  40 mg Oral QAM AC       PRN Meds:benzocaine-menthol, guaiFENesin-dextromethorphan, ondansetron, sodium chloride, phenylephrine, acetaminophen, oxyCODONE-acetaminophen **AND** oxyCODONE, benzonatate    Physical    VITALS:  /74   Pulse 75   Temp 97.5 °F (36.4 °C)   Resp 18   Ht 6' 1\" (1.854 m)   Wt 197 lb 14.4 oz (89.8 kg)   SpO2 91%   BMI 26.11 kg/m²     24HR INTAKE/OUTPUT:      Intake/Output Summary (Last 24 hours) at 2022 1505  Last data filed at 2022 0751  Gross per 24 hour   Intake 280 ml   Output 725 ml   Net -445 ml       24HR PULSE OXIMETRY RANGE:    SpO2  Av %  Min: 86 %  Max: 95 %    General appearance: alert, appears stated age and cooperative  Lungs: rhonchi bibasilar  Heart: regular rate and rhythm, S1, S2 normal, no murmur, click, rub or gallop  Abdomen: soft, non-tender; bowel sounds normal; no masses,  no organomegaly  Extremities: extremities normal, atraumatic, no cyanosis or edema  Neurologic: Mental status: Alert, oriented, thought content appropriate    Data    CBC:   Recent Labs     22  0237 22  0000 22  0407   WBC 11.1 8.5 6.6   HGB 15.7 15.2 15.2 HCT 47.3 46.2 46.3   MCV 91.1 91.8 91.5    169 160       BMP:  Recent Labs     02/03/22  0237 02/04/22  0000 02/05/22  0407    137 135   K 4.7 4.2 3.9    104 101   CO2 22 22 25   PHOS 2.7 2.7 2.8   BUN 16 16 16   CREATININE 0.7 0.7 0.6*    ALB:3,BILIDIR:3,BILITOT:3,ALKPHOS:3)@    PT/INR: No results for input(s): PROTIME, INR in the last 72 hours. ABG:   No results for input(s): PH, PO2, PCO2, HCO3, BE, O2SAT, METHB, O2HB, COHB, O2CON, HHB, THB in the last 72 hours. 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 Cont with steroids, taper as tolerated, may switch to po in another day  2. Cont with oxygen, taper as tolerated  3. Cont with nebs  4. Antibiotics as per ID      Time at the bedside, reviewing labs and radiographs, reviewing notes and consultations, discussing with staff and family was more than 35 minutes. Thanks for letting us see this patient in consultation. Please contact us with any questions. Office [phone] or after hours through [employer], x [phone].

## 2022-02-05 NOTE — PROGRESS NOTES
edema. Lines: peripheral    Laboratory and Tests Review:  Lab Results   Component Value Date    WBC 6.6 02/05/2022    WBC 8.5 02/04/2022    WBC 11.1 02/03/2022    HGB 15.2 02/05/2022    HCT 46.3 02/05/2022    MCV 91.5 02/05/2022     02/05/2022     Lab Results   Component Value Date    NEUTROABS 5.55 02/05/2022    NEUTROABS 8.16 (H) 02/04/2022    NEUTROABS 10.42 (H) 02/03/2022     No results found for: [facility]  Lab Results   Component Value Date    ALT 11 02/05/2022    AST 18 02/05/2022    ALKPHOS 55 02/05/2022    BILITOT 0.4 02/05/2022     Lab Results   Component Value Date     02/05/2022    K 3.9 02/05/2022    K 4.0 02/01/2022     02/05/2022    CO2 25 02/05/2022    BUN 16 02/05/2022    CREATININE 0.6 02/05/2022    CREATININE 0.7 02/04/2022    CREATININE 0.7 02/03/2022    GFRAA >60 02/05/2022    LABGLOM >60 02/05/2022    GLUCOSE 105 02/05/2022    PROT 5.7 02/05/2022    LABALBU 3.6 02/05/2022    CALCIUM 9.4 02/05/2022    BILITOT 0.4 02/05/2022    ALKPHOS 55 02/05/2022    AST 18 02/05/2022    ALT 11 02/05/2022     Lab Results   Component Value Date    CRP 0.3 02/05/2022    CRP 0.3 02/04/2022    CRP 0.5 (H) 02/03/2022     Lab Results   Component Value Date    SEDRATE 0 02/04/2022    SEDRATE 1 02/03/2022    SEDRATE 2 02/02/2022     Radiology:  Noted     Microbiology:   Blood cultures: 1 bottle micrococcus - contaminant  Streptococcus pneumoniae/Legionella urine Ag: negative  Respiratory Viral Panel: Coronavirus 229E  Respiratory Culture: oral tonja - g/s showing moderate GPC in clusters, moderate GPD, moderate GPR     No results for input(s): PROCAL in the last 72 hours.     Assessment:  · MAC infection of the lung. Treated with 3 antimycobacterial agents for 2 years.   Now on suppressive Azithromycin 500 mg p.o. 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 Above discussed agree with advanced practice nurse. Labs, cultures, and radiographs reviewed. Face to Face encounter occurred. Changes made as necessary.      Amelie Lynn MD

## 2022-02-06 LAB
ALBUMIN SERPL-MCNC: 3.8 G/DL (ref 3.5–5.2)
ALP BLD-CCNC: 59 U/L (ref 40–129)
ALT SERPL-CCNC: 11 U/L (ref 0–40)
ANION GAP SERPL CALCULATED.3IONS-SCNC: 10 MMOL/L (ref 7–16)
AST SERPL-CCNC: 18 U/L (ref 0–39)
BASOPHILS ABSOLUTE: 0.01 E9/L (ref 0–0.2)
BASOPHILS RELATIVE PERCENT: 0.2 % (ref 0–2)
BILIRUB SERPL-MCNC: 0.5 MG/DL (ref 0–1.2)
BILIRUBIN DIRECT: <0.2 MG/DL (ref 0–0.3)
BILIRUBIN, INDIRECT: ABNORMAL MG/DL (ref 0–1)
BLOOD CULTURE, ROUTINE: NORMAL
BUN BLDV-MCNC: 17 MG/DL (ref 6–23)
C-REACTIVE PROTEIN: 0.3 MG/DL (ref 0–0.4)
CALCIUM SERPL-MCNC: 9.6 MG/DL (ref 8.6–10.2)
CHLORIDE BLD-SCNC: 99 MMOL/L (ref 98–107)
CO2: 27 MMOL/L (ref 22–29)
CREAT SERPL-MCNC: 0.7 MG/DL (ref 0.7–1.2)
EOSINOPHILS ABSOLUTE: 0 E9/L (ref 0.05–0.5)
EOSINOPHILS RELATIVE PERCENT: 0 % (ref 0–6)
GFR AFRICAN AMERICAN: >60
GFR NON-AFRICAN AMERICAN: >60 ML/MIN/1.73
GLUCOSE BLD-MCNC: 105 MG/DL (ref 74–99)
HCT VFR BLD CALC: 48.9 % (ref 37–54)
HEMOGLOBIN: 16.2 G/DL (ref 12.5–16.5)
IMMATURE GRANULOCYTES #: 0.05 E9/L
IMMATURE GRANULOCYTES %: 0.9 % (ref 0–5)
LYMPHOCYTES ABSOLUTE: 0.64 E9/L (ref 1.5–4)
LYMPHOCYTES RELATIVE PERCENT: 11.3 % (ref 20–42)
MAGNESIUM: 2 MG/DL (ref 1.6–2.6)
MCH RBC QN AUTO: 29.8 PG (ref 26–35)
MCHC RBC AUTO-ENTMCNC: 33.1 % (ref 32–34.5)
MCV RBC AUTO: 89.9 FL (ref 80–99.9)
MONOCYTES ABSOLUTE: 0.36 E9/L (ref 0.1–0.95)
MONOCYTES RELATIVE PERCENT: 6.4 % (ref 2–12)
NEUTROPHILS ABSOLUTE: 4.58 E9/L (ref 1.8–7.3)
NEUTROPHILS RELATIVE PERCENT: 81.2 % (ref 43–80)
PDW BLD-RTO: 14.4 FL (ref 11.5–15)
PHOSPHORUS: 3.4 MG/DL (ref 2.5–4.5)
PLATELET # BLD: 172 E9/L (ref 130–450)
PMV BLD AUTO: 9.9 FL (ref 7–12)
POTASSIUM SERPL-SCNC: 4.3 MMOL/L (ref 3.5–5)
RBC # BLD: 5.44 E12/L (ref 3.8–5.8)
SEDIMENTATION RATE, ERYTHROCYTE: 2 MM/HR (ref 0–15)
SODIUM BLD-SCNC: 136 MMOL/L (ref 132–146)
TOTAL PROTEIN: 5.9 G/DL (ref 6.4–8.3)
WBC # BLD: 5.6 E9/L (ref 4.5–11.5)

## 2022-02-06 PROCEDURE — 84100 ASSAY OF PHOSPHORUS: CPT

## 2022-02-06 PROCEDURE — 85651 RBC SED RATE NONAUTOMATED: CPT

## 2022-02-06 PROCEDURE — 83735 ASSAY OF MAGNESIUM: CPT

## 2022-02-06 PROCEDURE — 80048 BASIC METABOLIC PNL TOTAL CA: CPT

## 2022-02-06 PROCEDURE — 85025 COMPLETE CBC W/AUTO DIFF WBC: CPT

## 2022-02-06 PROCEDURE — 6360000002 HC RX W HCPCS: Performed by: INTERNAL MEDICINE

## 2022-02-06 PROCEDURE — 36415 COLL VENOUS BLD VENIPUNCTURE: CPT

## 2022-02-06 PROCEDURE — 6370000000 HC RX 637 (ALT 250 FOR IP): Performed by: INTERNAL MEDICINE

## 2022-02-06 PROCEDURE — 94640 AIRWAY INHALATION TREATMENT: CPT

## 2022-02-06 PROCEDURE — 86140 C-REACTIVE PROTEIN: CPT

## 2022-02-06 PROCEDURE — 2060000000 HC ICU INTERMEDIATE R&B

## 2022-02-06 PROCEDURE — 80076 HEPATIC FUNCTION PANEL: CPT

## 2022-02-06 PROCEDURE — 2700000000 HC OXYGEN THERAPY PER DAY

## 2022-02-06 RX ADMIN — IPRATROPIUM BROMIDE AND ALBUTEROL SULFATE 1 AMPULE: 2.5; .5 SOLUTION RESPIRATORY (INHALATION) at 13:03

## 2022-02-06 RX ADMIN — IBUPROFEN 400 MG: 400 TABLET, FILM COATED ORAL at 11:19

## 2022-02-06 RX ADMIN — ARFORMOTEROL TARTRATE 15 MCG: 15 SOLUTION RESPIRATORY (INHALATION) at 08:57

## 2022-02-06 RX ADMIN — IPRATROPIUM BROMIDE AND ALBUTEROL SULFATE 1 AMPULE: 2.5; .5 SOLUTION RESPIRATORY (INHALATION) at 08:57

## 2022-02-06 RX ADMIN — FERROUS SULFATE TAB 325 MG (65 MG ELEMENTAL FE) 325 MG: 325 (65 FE) TAB at 07:59

## 2022-02-06 RX ADMIN — IPRATROPIUM BROMIDE AND ALBUTEROL SULFATE 1 AMPULE: 2.5; .5 SOLUTION RESPIRATORY (INHALATION) at 16:20

## 2022-02-06 RX ADMIN — IBUPROFEN 400 MG: 400 TABLET, FILM COATED ORAL at 17:45

## 2022-02-06 RX ADMIN — PANTOPRAZOLE SODIUM 40 MG: 40 TABLET, DELAYED RELEASE ORAL at 05:12

## 2022-02-06 RX ADMIN — IBUPROFEN 400 MG: 400 TABLET, FILM COATED ORAL at 07:59

## 2022-02-06 RX ADMIN — OXYCODONE 2.5 MG: 5 TABLET ORAL at 07:59

## 2022-02-06 RX ADMIN — BUDESONIDE 250 MCG: 0.25 SUSPENSION RESPIRATORY (INHALATION) at 20:08

## 2022-02-06 RX ADMIN — BUDESONIDE 250 MCG: 0.25 SUSPENSION RESPIRATORY (INHALATION) at 08:57

## 2022-02-06 RX ADMIN — IPRATROPIUM BROMIDE AND ALBUTEROL SULFATE 1 AMPULE: 2.5; .5 SOLUTION RESPIRATORY (INHALATION) at 20:08

## 2022-02-06 RX ADMIN — METHYLPREDNISOLONE SODIUM SUCCINATE 40 MG: 40 INJECTION, POWDER, LYOPHILIZED, FOR SOLUTION INTRAMUSCULAR; INTRAVENOUS at 17:46

## 2022-02-06 RX ADMIN — METHYLPREDNISOLONE SODIUM SUCCINATE 40 MG: 40 INJECTION, POWDER, LYOPHILIZED, FOR SOLUTION INTRAMUSCULAR; INTRAVENOUS at 05:12

## 2022-02-06 RX ADMIN — ARFORMOTEROL TARTRATE 15 MCG: 15 SOLUTION RESPIRATORY (INHALATION) at 20:08

## 2022-02-06 RX ADMIN — FERROUS SULFATE TAB 325 MG (65 MG ELEMENTAL FE) 325 MG: 325 (65 FE) TAB at 17:45

## 2022-02-06 RX ADMIN — ENOXAPARIN SODIUM 40 MG: 100 INJECTION SUBCUTANEOUS at 17:45

## 2022-02-06 ASSESSMENT — PAIN DESCRIPTION - DESCRIPTORS: DESCRIPTORS: SHARP;STABBING

## 2022-02-06 ASSESSMENT — PAIN SCALES - GENERAL
PAINLEVEL_OUTOF10: 6
PAINLEVEL_OUTOF10: 1
PAINLEVEL_OUTOF10: 1
PAINLEVEL_OUTOF10: 3
PAINLEVEL_OUTOF10: 0
PAINLEVEL_OUTOF10: 4
PAINLEVEL_OUTOF10: 1

## 2022-02-06 ASSESSMENT — PAIN DESCRIPTION - ORIENTATION
ORIENTATION: LEFT
ORIENTATION: LEFT

## 2022-02-06 ASSESSMENT — PAIN DESCRIPTION - LOCATION
LOCATION: HIP
LOCATION: HIP

## 2022-02-06 ASSESSMENT — PAIN DESCRIPTION - FREQUENCY: FREQUENCY: INTERMITTENT

## 2022-02-06 ASSESSMENT — PAIN DESCRIPTION - PROGRESSION: CLINICAL_PROGRESSION: NOT CHANGED

## 2022-02-06 ASSESSMENT — PAIN - FUNCTIONAL ASSESSMENT: PAIN_FUNCTIONAL_ASSESSMENT: PREVENTS OR INTERFERES SOME ACTIVE ACTIVITIES AND ADLS

## 2022-02-06 NOTE — PROGRESS NOTES
[facility]  [address] [facility], [address], [address] [facility]      Pulmonary Progress Note      SUBJECTIVE:  Sitting up in chair, claims doing ok with breathing, ambulating some in room and tolerating. Currently on 5 li NC saturating 92-93%, not much cough/congestion, hoping to walk around later today to see how much oxygen needs to be increased if needed.     OBJECTIVE    Medications    Continuous Infusions:    Scheduled Meds:   methylPREDNISolone  40 mg IntraVENous Q12H    ferrous sulfate  325 mg Oral BID WC    azithromycin  500 mg Oral Once per day on     ibuprofen  400 mg Oral TID     Arformoterol Tartrate  15 mcg Nebulization BID    budesonide  250 mcg Nebulization BID    ipratropium-albuterol  1 ampule Inhalation Q4H WA    enoxaparin  40 mg SubCUTAneous Daily    pantoprazole  40 mg Oral QAM AC       PRN Meds:benzocaine-menthol, guaiFENesin-dextromethorphan, ondansetron, sodium chloride, phenylephrine, acetaminophen, oxyCODONE-acetaminophen **AND** oxyCODONE, benzonatate    Physical    VITALS:  /68   Pulse 82   Temp 97.8 °F (36.6 °C) (Axillary)   Resp 18   Ht 6' 1\" (1.854 m)   Wt 193 lb 6.4 oz (87.7 kg)   SpO2 92%   BMI 25.52 kg/m²     24HR INTAKE/OUTPUT:    No intake or output data in the 24 hours ending 22 1432    24HR PULSE OXIMETRY RANGE:    SpO2  Av.7 %  Min: 91 %  Max: 96 %    General appearance: alert, appears stated age and cooperative  Lungs: rhonchi bibasilar  Heart: regular rate and rhythm, S1, S2 normal, no murmur, click, rub or gallop  Abdomen: soft, non-tender; bowel sounds normal; no masses,  no organomegaly  Extremities: extremities normal, atraumatic, no cyanosis or edema  Neurologic: Mental status: Alert, oriented, thought content appropriate    Data    CBC:   Recent Labs     22  0000 22  0407 22  0321   WBC 8.5 6.6 5.6   HGB 15.2 15.2 16.2   HCT 46.2 46.3 48.9   MCV 91.8 91.5 89.9    160 172       BMP:  Recent Labs     02/04/22  0000 02/05/22  0407 02/06/22  0321    135 136   K 4.2 3.9 4.3    101 99   CO2 22 25 27   PHOS 2.7 2.8 3.4   BUN 16 16 17   CREATININE 0.7 0.6* 0.7    ALB:3,BILIDIR:3,BILITOT:3,ALKPHOS:3)@    PT/INR: No results for input(s): PROTIME, INR in the last 72 hours. ABG:   No results for input(s): PH, PO2, PCO2, HCO3, BE, O2SAT, METHB, O2HB, COHB, O2CON, HHB, THB in the last 72 hours. 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 Cont with steroids, taper as tolerated, can try po tomorrow  2. Cont with oxygen, taper as tolerated, will need to see how much supplementation needed with ambulation  3. Cont with nebs  4. Antibiotics as per ID      Time at the bedside, reviewing labs and radiographs, reviewing notes and consultations, discussing with staff and family was more than 35 minutes. Thanks for letting us see this patient in consultation. Please contact us with any questions. Office [phone] or after hours through [email or web address], x [phone].

## 2022-02-06 NOTE — PROGRESS NOTES
PROGRESS  NOTE --                                                          INTERNAL  MEDICINE                                                                              I  PERSONALLY SAW , EXAMINED, AND CARED Sheila Corona, 2/6/2022     LABS, XRAY ,CHART, AND MEDICATIONS  REVIEWED BY ME       Chief complaint: Short of breath      2/3/2022-SUBJECTIVE: Lethgriffin Robison is alert awake and cooperative; oriented ×3. Denies any chest pain dyspnea nausea emesis. Tolerating diet. No abdominal pain. Sitting up in bed finishing his lunch. Throat feels better with lozenges. Less cough. I discussed at length coronavirus infection; advised him he does not have SARS-CoV-2. He states his wife has similar symptoms currently at home with sore throat minimal cough. Afebrile last 24 hours. SPO2 94 on 6 L nasal cannula. Intake and output -1000 cc. Fasting glucose 155 CRP 0.5 improving. Procalcitonin 0.11. A1c 5.4 TSH 0.858. WBC 11.1 hemoglobin 15.7. Iron 45, iron saturation 13, both low; TIBC normal ferritin 63. E48 folic acid normal.  ESR one. Respiratory culture pending. Hemoglobin is pending. Molecular/PCR viral respiratory panel positive for coronavirus 229E. Legionella and strep antigen presumptive negative. ID consult from yesterday appreciated. He completed 2 years of treatment with ethambutol rifampin moxifloxacin which was changed to clarithromycin. 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 He never sterilized his respiratory cultures. He was offered Arikayce but declined due to out-of-pocket expenses. Now on suppressive azithromycin 500 mg three times weekly. 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 ID note from today unchanged. Pulmonary note from today wean steroids to 40 mg every 6 hours.      2/4/2022-sitting up in bed, finishing his nebulizer treatment. States he feels much better overall. No chest pain minimal cough sore throat has improved. Appetite improving. Afebrile last 24 hours. SPO2 89 on 6 L nasal cannula. Intake and output +1564 cc. Fasting glucose 128 CRP 0.3. ESR 0. Sputum Gram stain as follows--    Value: Group 6: <25 PMN's/LPF and <25 Epithelial cells/LPF   Moderate Polymorphonuclear leukocytes   Few Epithelial cells   Moderate Gram positive cocci in clusters   Moderate Gram positive diplococci   Moderate Gram positive rods      Blood cultures with following results--     2/1/22 1155   Culture, Blood 2  Abnormal   24 Hours no growth   Gram stain performed from blood culture bottle media   Gram positive cocci in clusters         ID note from yesterday appreciated; resume azithromycin 3 times weekly for suppressive MAC therapy. Pulmonary note from yesterday unchanged. ID note from today occasional cough tolerating antibiotics. Hemoglobin is pending; tetanus and pneumococcal antibodies pending. The above blood culture likely contaminant. 2/5/2022-patient sitting in bedside chair; no chest pain still has some tightness from his breathing problems. He notices he is urinating frequently at this time; has been consuming normal amounts of food and water. Still becomes short of breath with activity. No diarrhea no nausea. Afebrile last 24 hours. SPO2 90 on 6 L nasal cannula. Intake and output +1119 cc. Fasting glucose 105. CRP 0.3. Hemoglobin 15.2 WC 6.6. Pulmonary note from yesterday appreciated. Wean steroids to 40 mg IV every 12 hours continue nebulizers. Hopefully discharge soon. ID note from today, feeling a little better still some cough brownish-yellow sputum. Continue suppressive azithromycin. IgG IgA IgM IgE all pending; tetanus and pneumococcal antibodies pending. 2/6/2022-patient sitting in bedside chair just finished his lunch.   No chest pain no dyspnea at rest.  Still gets short of breath with activity. Less urination with discontinuation of IV. No nausea vomiting diarrhea. Feeling better. Afebrile last 24 hours. SPO2 93 on 5 L nasal cannula. Intake and output inaccurate. Fasting glucose 105. CRP 0.3. Liver functions normal.  Hemoglobin 16.2 WBC 5.6. ESR 2. IgG slightly low at 675 IgA IgM both normal.  Pulmonary note from yesterday Taper steroids as tolerated consider switching to p.o. in another day. ID note from today essentially unchanged.       Objective:     PHYSICAL EXAM:    VS: /68   Pulse 82   Temp 97.8 °F (36.6 °C) (Axillary)   Resp 18   Ht 6' 1\" (1.854 m)   Wt 193 lb 6.4 oz (87.7 kg)   SpO2 93%   BMI 25.52 kg/m²     Labs:   CBC:   Lab Results   Component Value Date    WBC 5.6 02/06/2022    RBC 5.44 02/06/2022    HGB 16.2 02/06/2022    HCT 48.9 02/06/2022    MCV 89.9 02/06/2022    MCH 29.8 02/06/2022    MCHC 33.1 02/06/2022    RDW 14.4 02/06/2022     02/06/2022    MPV 9.9 02/06/2022     CBC with Differential:    Lab Results   Component Value Date    WBC 5.6 02/06/2022    RBC 5.44 02/06/2022    HGB 16.2 02/06/2022    HCT 48.9 02/06/2022     02/06/2022    MCV 89.9 02/06/2022    MCH 29.8 02/06/2022    MCHC 33.1 02/06/2022    RDW 14.4 02/06/2022    NRBC 0.0 02/04/2022    LYMPHOPCT 11.3 02/06/2022    MONOPCT 6.4 02/06/2022    MYELOPCT 0.9 04/07/2021    BASOPCT 0.2 02/06/2022    MONOSABS 0.36 02/06/2022    LYMPHSABS 0.64 02/06/2022    EOSABS 0.00 02/06/2022    BASOSABS 0.01 02/06/2022     Hemoglobin/Hematocrit:    Lab Results   Component Value Date    HGB 16.2 02/06/2022    HCT 48.9 02/06/2022     CMP:    Lab Results   Component Value Date     02/06/2022    K 4.3 02/06/2022    K 4.0 02/01/2022    CL 99 02/06/2022    CO2 27 02/06/2022    BUN 17 02/06/2022    CREATININE 0.7 02/06/2022    GFRAA >60 02/06/2022    LABGLOM >60 02/06/2022    GLUCOSE 105 02/06/2022    PROT 5.9 02/06/2022    LABALBU 3.8 02/06/2022    CALCIUM 9.6 02/06/2022    BILITOT 0.5 02/06/2022 ALKPHOS 59 02/06/2022    AST 18 02/06/2022    ALT 11 02/06/2022     BMP:    Lab Results   Component Value Date     02/06/2022    K 4.3 02/06/2022    K 4.0 02/01/2022    CL 99 02/06/2022    CO2 27 02/06/2022    BUN 17 02/06/2022    LABALBU 3.8 02/06/2022    CREATININE 0.7 02/06/2022    CALCIUM 9.6 02/06/2022    GFRAA >60 02/06/2022    LABGLOM >60 02/06/2022    GLUCOSE 105 02/06/2022     Hepatic Function Panel:    Lab Results   Component Value Date    ALKPHOS 59 02/06/2022    ALT 11 02/06/2022    AST 18 02/06/2022    PROT 5.9 02/06/2022    BILITOT 0.5 02/06/2022    BILIDIR <0.2 02/06/2022    IBILI see below 02/06/2022    LABALBU 3.8 02/06/2022     Ionized Calcium:  No results found for: IONCA  Magnesium:    Lab Results   Component Value Date    MG 2.0 02/06/2022     Phosphorus:    Lab Results   Component Value Date    PHOS 3.4 02/06/2022     LDH:    Lab Results   Component Value Date     01/02/2021     Uric Acid:    Lab Results   Component Value Date    LABURIC 4.8 02/02/2022     PT/INR:    Lab Results   Component Value Date    PROTIME 12.5 05/30/2019    INR 1.1 05/30/2019     Warfarin PT/INR:  No components found for: PTPATWAR, PTINRWAR  PTT:    Lab Results   Component Value Date    APTT 31.4 05/30/2019   [APTT}  Troponin:    Lab Results   Component Value Date    TROPONINI <0.01 04/08/2021     Last 3 Troponin:    Lab Results   Component Value Date    TROPONINI <0.01 04/08/2021    TROPONINI <0.01 04/08/2021    TROPONINI <0.01 01/02/2021     U/A:    Lab Results   Component Value Date    COLORU Yellow 02/01/2022    PROTEINU Negative 02/01/2022    PHUR 5.5 02/01/2022    LABCAST FEW 10/02/2019    WBCUA NONE 04/07/2021    RBCUA NONE 04/07/2021    MUCUS Present 04/07/2021    BACTERIA FEW 04/07/2021    CLARITYU Clear 02/01/2022    SPECGRAV 1.020 02/01/2022    LEUKOCYTESUR Negative 02/01/2022    UROBILINOGEN 0.2 02/01/2022    BILIRUBINUR MODERATE 02/01/2022    BLOODU Negative 02/01/2022    GLUCOSEU Negative 02/01/2022     HgBA1c:    Lab Results   Component Value Date    LABA1C 5.4 02/01/2022     FLP:    Lab Results   Component Value Date    TRIG 41 02/02/2022    HDL 61 02/02/2022    LDLCALC 72 02/02/2022    LABVLDL 8 02/02/2022     TSH:    Lab Results   Component Value Date    TSH 0.858 02/02/2022     VITAMIN B12: No components found for: B12  FOLATE:    Lab Results   Component Value Date    FOLATE 12.7 02/01/2022     IRON:    Lab Results   Component Value Date    IRON 45 02/02/2022     Iron Saturation:  No components found for: PERCENTFE  TIBC:    Lab Results   Component Value Date    TIBC 339 02/02/2022     FERRITIN:    Lab Results   Component Value Date    FERRITIN 63 02/02/2022        General appearance: Alert, Awake, Oriented times 3, no distress; nasal cannula oxygen in place  Skin: Warm and dry ; no rashes  Head: Normocephalic. No masses, lesions or tenderness noted  Eyes: Conjunctivae pink, sclera white. PERRL,EOM-I  Ears: External ears normal  Nose/Sinuses: Nares normal. Septum midline. Mucosa normal. No drainage  Oropharynx: Oropharynx clear with no exudate seen  Neck: Supple. No jugular venous distension, lymphadenopathy or thyromegaly Trachea midline  Lungs: Mostly clear  Heart: S1 S2  Regular rate and rhythm. No rub, murmur or gallop  Abdomen: Soft, non-tender. BS normal. No masses, organomegaly; no rebound or guarding  Extremities: No edema, Peripheral pulses palpable  Musculoskeletal: Muscular strength appears intact. Neuro:  No focal motor defects ; II-XII grossly intact .  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)    Acute respiratory failure with hypoxia (HCC)    Coronavirus infection    Immunocompromised (HCC)    Iron deficiency  Resolved Problems:    Thoracic ascending aortic aneurysm ([address]    Acute hypoxemic respiratory failure (HCC)      PLAN:  SEE ORDERS      RE  CHANGES AND FINDINGS   Medications reviewed with patient  GI prophylaxis  DVT prophylaxis  Consultants notes reviewed  Ferrous sulfate 325 mg twice daily due to iron deficiency  Await results of respiratory culture and immunoglobin studies  Arformoterol 15 mcg twice daily  Budesonide 250 mcg twice daily  Cefepime and doxycycline have been discontinued  DuoNeb every 4 hours while awake  Methylprednisolone decreased to 40 mg IV every 6 hours  Oxycodone 7.5 mg every 12 hours as needed  Continue arformoterol budesonide and DuoNeb  Azithromycin 500 mg Monday Wednesday Friday  Ibuprofen 400 mg 3 times daily  Methylprednisolone 40 mg IV every 6 hours  Percocet 7.5 mg twice daily as needed  Await immunoglobin's, tetanus and pneumococcal antibodies  Monitor labs  2/5/2022  Azithromycin 500 mg Monday Wednesday Friday  Arformoterol 15 mcg twice daily  Budesonide 250 mcg twice daily  DuoNeb every 4 hours while awake  Methylprednisolone 40 mg IV every 12 hours  0.9 sodium chloride 75 cc an hour will be discontinued since oral intake is adequate  Percocet 7.5, every 12 hours as needed for severe pain  2/6/2022  Azithromycin 500 mg Monday Wednesday Friday, suppression  Continue arformoterol budesonide and DuoNeb nebulizer  Methylprednisolone 40 mg IV every 12 hours  Percocet 7.5 every 12 hours as needed for pain      Discussed with patient and nursing. Karen Jaimes DO     11:10 AM     2/6/2022    TIME > 25 MINUTES    >  50 %  OF  TIME  DISCUSSION               ------------  INFORMATION  -----------      DIET:ADULT DIET;  Regular      No Known Allergies      MEDICATION SIDE EFFECTS:none       SCHEDULED MEDS:                                 Scheduled Meds:   methylPREDNISolone  40 mg IntraVENous Q12H    ferrous sulfate  325 mg Oral BID WC    azithromycin  500 mg Oral Once per day on Mon Wed Fri    ibuprofen  400 mg Oral TID WC    Arformoterol Tartrate  15 mcg Nebulization BID    budesonide  250 mcg Nebulization BID    ipratropium-albuterol  1 ampule Inhalation Q4H WA    enoxaparin  40 mg SubCUTAneous Daily    pantoprazole  40 mg Oral QAM AC       Continuous Infusions:        Data:     No intake or output data in the 24 hours ending 02/06/22 1110    Wt Readings from Last 3 Encounters:   02/06/22 193 lb 6.4 oz (87.7 kg)   01/18/22 186 lb (84.4 kg)   01/13/22 184 lb (83.5 kg)       Labs: Additional    GLUCOSE:No results for input(s): POCGLU in the last 72 hours. BNP:No results found for: BNP    CRP:   Recent Labs     02/04/22  0000 02/05/22  0407 02/06/22  0321   CRP 0.3 0.3 0.3       ESR:  Recent Labs     02/04/22  0000 02/05/22  0407 02/06/22  0321   SEDRATE 0 0 2       RADIOLOGY: REVIEWED AVAILABLE REPORT  XR CHEST PORTABLE   Final Result   1. Advanced emphysematous changes. 2. Patchy bibasilar opacities which could represent chronic scarring or   possibly pneumonia   3. Chronic elevation of the right hemidiaphragm with pleuroparenchymal   scarring within the right lung base.                    Landen Baugh DO    11:10 AM     2/6/2022      Voice recognition software used for dictation

## 2022-02-06 NOTE — PROGRESS NOTES
[address] [place] [address] [facility]  [facility]  Progress Note    SUBJECTIVE:  Chief Complaint   Patient presents with    Shortness of Breath     hx copd, hypoxic on home 3L NC, cough, headache, bodyaches, x3 days     States breathing 'so-so'. +TABRO  On 6LNC, endorsing some dyspnea on exertion   minimal cough w/ brownish/yellow sputum  No fevers. Good appetite. No n/v/d. Review of systems:  As stated above in the chief complaint, otherwise negative. Medications:  Scheduled Meds:   methylPREDNISolone  40 mg IntraVENous Q12H    ferrous sulfate  325 mg Oral BID WC    azithromycin  500 mg Oral Once per day on     ibuprofen  400 mg Oral TID WC    Arformoterol Tartrate  15 mcg Nebulization BID    budesonide  250 mcg Nebulization BID    ipratropium-albuterol  1 ampule Inhalation Q4H WA    enoxaparin  40 mg SubCUTAneous Daily    pantoprazole  40 mg Oral QAM AC     Continuous Infusions:     PRN Meds:benzocaine-menthol, guaiFENesin-dextromethorphan, ondansetron, sodium chloride, phenylephrine, acetaminophen, oxyCODONE-acetaminophen **AND** oxyCODONE, benzonatate    OBJECTIVE:  /68   Pulse 82   Temp 97.8 °F (36.6 °C) (Axillary)   Resp 18   Ht 6' 1\" (1.854 m)   Wt 193 lb 6.4 oz (87.7 kg)   SpO2 94%   BMI 25.52 kg/m²   Temp  Av °F (36.7 °C)  Min: 97.8 °F (36.6 °C)  Max: 98.2 °F (36.8 °C)  Constitutional: The patient is awake, alert, and oriented. Sitting up in the bed, in no distress. Skin: Warm and dry. No rashes were noted. HEENT: Round and reactive pupils. Moist mucous membranes. No ulcerations or thrush. Neck: Supple to movements. Chest: No respiratory distress. Symmetrical expansion. 6LNC. Diminished b/l  Cardiovascular: S1 and S2 are rhythmic and regular. No murmurs appreciated. Abdomen: Positive bowel sounds to auscultation. Soft, nondistended. Extremities: No edema.   Lines: peripheral    Laboratory and Tests Review:  Lab Results   Component Value Date    WBC 5.6 02/06/2022    WBC 6.6 02/05/2022    WBC 8.5 02/04/2022    HGB 16.2 02/06/2022    HCT 48.9 02/06/2022    MCV 89.9 02/06/2022     02/06/2022     Lab Results   Component Value Date    NEUTROABS 4.58 02/06/2022    NEUTROABS 5.55 02/05/2022    NEUTROABS 8.16 (H) 02/04/2022     No results found for: CRP  Lab Results   Component Value Date    ALT 11 02/06/2022    AST 18 02/06/2022    ALKPHOS 59 02/06/2022    BILITOT 0.5 02/06/2022     Lab Results   Component Value Date     02/06/2022    K 4.3 02/06/2022    K 4.0 02/01/2022    CL 99 02/06/2022    CO2 27 02/06/2022    BUN 17 02/06/2022    CREATININE 0.7 02/06/2022    CREATININE 0.6 02/05/2022    CREATININE 0.7 02/04/2022    GFRAA >60 02/06/2022    LABGLOM >60 02/06/2022    GLUCOSE 105 02/06/2022    PROT 5.9 02/06/2022    LABALBU 3.8 02/06/2022    CALCIUM 9.6 02/06/2022    BILITOT 0.5 02/06/2022    ALKPHOS 59 02/06/2022    AST 18 02/06/2022    ALT 11 02/06/2022     Lab Results   Component Value Date    CRP 0.3 02/06/2022    CRP 0.3 02/05/2022    CRP 0.3 02/04/2022     Lab Results   Component Value Date    SEDRATE 2 02/06/2022    SEDRATE 0 02/05/2022    SEDRATE 0 02/04/2022     Radiology:  Noted     Microbiology:   Blood cultures: 1 bottle micrococcus - contaminant  Streptococcus pneumoniae/Legionella urine Ag: negative  Respiratory Viral Panel: Coronavirus 229E  Respiratory Culture: oral tonja - g/s showing moderate GPC in clusters, moderate GPD, moderate GPR   IgA 218  IgG 675  IgM 84    No results for input(s): PROCAL in the last 72 hours. Assessment:  MAC infection of the lung. Treated with 3 antimycobacterial agents for 2 years. Now on suppressive Azithromycin 500 mg p.o. 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 Above discussed agree with advanced practice nurse. Labs, cultures, and radiographs reviewed. Face to Face encounter occurred. Changes made as necessary.      Memo Aleman MD

## 2022-02-07 VITALS
RESPIRATION RATE: 20 BRPM | SYSTOLIC BLOOD PRESSURE: 114 MMHG | HEART RATE: 91 BPM | BODY MASS INDEX: 25.38 KG/M2 | DIASTOLIC BLOOD PRESSURE: 80 MMHG | HEIGHT: 73 IN | TEMPERATURE: 98.4 F | OXYGEN SATURATION: 93 % | WEIGHT: 191.5 LBS

## 2022-02-07 LAB
BASOPHILS ABSOLUTE: 0.01 E9/L (ref 0–0.2)
BASOPHILS RELATIVE PERCENT: 0.1 % (ref 0–2)
C-REACTIVE PROTEIN: 0.3 MG/DL (ref 0–0.4)
CULTURE, BLOOD 2: ABNORMAL
EOSINOPHILS ABSOLUTE: 0.01 E9/L (ref 0.05–0.5)
EOSINOPHILS RELATIVE PERCENT: 0.1 % (ref 0–6)
HCT VFR BLD CALC: 50.7 % (ref 37–54)
HEMOGLOBIN: 17 G/DL (ref 12.5–16.5)
IMMATURE GRANULOCYTES #: 0.04 E9/L
IMMATURE GRANULOCYTES %: 0.4 % (ref 0–5)
LYMPHOCYTES ABSOLUTE: 0.91 E9/L (ref 1.5–4)
LYMPHOCYTES RELATIVE PERCENT: 9.8 % (ref 20–42)
Lab: NORMAL
MCH RBC QN AUTO: 30.5 PG (ref 26–35)
MCHC RBC AUTO-ENTMCNC: 33.5 % (ref 32–34.5)
MCV RBC AUTO: 90.9 FL (ref 80–99.9)
MONOCYTES ABSOLUTE: 0.69 E9/L (ref 0.1–0.95)
MONOCYTES RELATIVE PERCENT: 7.5 % (ref 2–12)
NEUTROPHILS ABSOLUTE: 7.59 E9/L (ref 1.8–7.3)
NEUTROPHILS RELATIVE PERCENT: 82.1 % (ref 43–80)
ORGANISM: ABNORMAL
PDW BLD-RTO: 14.3 FL (ref 11.5–15)
PLATELET # BLD: 189 E9/L (ref 130–450)
PMV BLD AUTO: 9.8 FL (ref 7–12)
RBC # BLD: 5.58 E12/L (ref 3.8–5.8)
REASON FOR REJECTION: NORMAL
REJECTED TEST: NORMAL
REPORT: NORMAL
SEDIMENTATION RATE, ERYTHROCYTE: 1 MM/HR (ref 0–15)
THIS TEST SENT TO: NORMAL
WBC # BLD: 9.3 E9/L (ref 4.5–11.5)

## 2022-02-07 PROCEDURE — 6370000000 HC RX 637 (ALT 250 FOR IP): Performed by: INTERNAL MEDICINE

## 2022-02-07 PROCEDURE — 85651 RBC SED RATE NONAUTOMATED: CPT

## 2022-02-07 PROCEDURE — 36415 COLL VENOUS BLD VENIPUNCTURE: CPT

## 2022-02-07 PROCEDURE — 6370000000 HC RX 637 (ALT 250 FOR IP): Performed by: SPECIALIST

## 2022-02-07 PROCEDURE — 94640 AIRWAY INHALATION TREATMENT: CPT

## 2022-02-07 PROCEDURE — 86140 C-REACTIVE PROTEIN: CPT

## 2022-02-07 PROCEDURE — 85025 COMPLETE CBC W/AUTO DIFF WBC: CPT

## 2022-02-07 PROCEDURE — 6360000002 HC RX W HCPCS: Performed by: INTERNAL MEDICINE

## 2022-02-07 PROCEDURE — 2700000000 HC OXYGEN THERAPY PER DAY

## 2022-02-07 RX ORDER — AZITHROMYCIN 500 MG/1
500 TABLET, FILM COATED ORAL
Qty: 30 TABLET | Refills: 1 | Status: SHIPPED | OUTPATIENT
Start: 2022-02-07 | End: 2022-02-17

## 2022-02-07 RX ORDER — PREDNISONE 10 MG/1
TABLET ORAL
Qty: 30 TABLET | Refills: 0 | Status: SHIPPED | OUTPATIENT
Start: 2022-02-07 | End: 2022-03-14 | Stop reason: ALTCHOICE

## 2022-02-07 RX ORDER — BENZONATATE 100 MG/1
100 CAPSULE ORAL 3 TIMES DAILY PRN
Qty: 30 CAPSULE | Refills: 1 | Status: SHIPPED | OUTPATIENT
Start: 2022-02-07 | End: 2022-02-14

## 2022-02-07 RX ORDER — PANTOPRAZOLE SODIUM 40 MG/1
40 TABLET, DELAYED RELEASE ORAL
Qty: 30 TABLET | Refills: 3 | Status: SHIPPED | OUTPATIENT
Start: 2022-02-08

## 2022-02-07 RX ORDER — FERROUS SULFATE 325(65) MG
325 TABLET ORAL 2 TIMES DAILY WITH MEALS
Qty: 30 TABLET | Refills: 3 | Status: SHIPPED | OUTPATIENT
Start: 2022-02-07

## 2022-02-07 RX ORDER — GUAIFENESIN/DEXTROMETHORPHAN 100-10MG/5
5 SYRUP ORAL EVERY 4 HOURS PRN
Qty: 120 ML | COMMUNITY
Start: 2022-02-07 | End: 2022-02-17

## 2022-02-07 RX ADMIN — BUDESONIDE 250 MCG: 0.25 SUSPENSION RESPIRATORY (INHALATION) at 08:48

## 2022-02-07 RX ADMIN — IPRATROPIUM BROMIDE AND ALBUTEROL SULFATE 1 AMPULE: 2.5; .5 SOLUTION RESPIRATORY (INHALATION) at 08:48

## 2022-02-07 RX ADMIN — PREDNISONE 30 MG: 20 TABLET ORAL at 08:34

## 2022-02-07 RX ADMIN — AZITHROMYCIN 500 MG: 250 TABLET, FILM COATED ORAL at 14:28

## 2022-02-07 RX ADMIN — ARFORMOTEROL TARTRATE 15 MCG: 15 SOLUTION RESPIRATORY (INHALATION) at 08:48

## 2022-02-07 RX ADMIN — IBUPROFEN 400 MG: 400 TABLET, FILM COATED ORAL at 08:34

## 2022-02-07 RX ADMIN — IBUPROFEN 400 MG: 400 TABLET, FILM COATED ORAL at 12:07

## 2022-02-07 RX ADMIN — PANTOPRAZOLE SODIUM 40 MG: 40 TABLET, DELAYED RELEASE ORAL at 05:18

## 2022-02-07 RX ADMIN — FERROUS SULFATE TAB 325 MG (65 MG ELEMENTAL FE) 325 MG: 325 (65 FE) TAB at 08:34

## 2022-02-07 RX ADMIN — OXYCODONE 2.5 MG: 5 TABLET ORAL at 08:34

## 2022-02-07 RX ADMIN — IPRATROPIUM BROMIDE AND ALBUTEROL SULFATE 1 AMPULE: 2.5; .5 SOLUTION RESPIRATORY (INHALATION) at 12:15

## 2022-02-07 RX ADMIN — OXYCODONE HYDROCHLORIDE AND ACETAMINOPHEN 1 TABLET: 5; 325 TABLET ORAL at 08:35

## 2022-02-07 ASSESSMENT — PAIN - FUNCTIONAL ASSESSMENT: PAIN_FUNCTIONAL_ASSESSMENT: PREVENTS OR INTERFERES SOME ACTIVE ACTIVITIES AND ADLS

## 2022-02-07 ASSESSMENT — PAIN SCALES - GENERAL
PAINLEVEL_OUTOF10: 2
PAINLEVEL_OUTOF10: 5
PAINLEVEL_OUTOF10: 3

## 2022-02-07 ASSESSMENT — PAIN DESCRIPTION - ORIENTATION
ORIENTATION: LEFT
ORIENTATION: RIGHT

## 2022-02-07 ASSESSMENT — PAIN DESCRIPTION - LOCATION
LOCATION: HIP
LOCATION: HIP

## 2022-02-07 ASSESSMENT — PAIN DESCRIPTION - ONSET: ONSET: ON-GOING

## 2022-02-07 ASSESSMENT — PAIN DESCRIPTION - FREQUENCY: FREQUENCY: CONTINUOUS

## 2022-02-07 ASSESSMENT — PAIN DESCRIPTION - PAIN TYPE: TYPE: CHRONIC PAIN

## 2022-02-07 ASSESSMENT — PAIN DESCRIPTION - DESCRIPTORS: DESCRIPTORS: ACHING;CONSTANT;DISCOMFORT

## 2022-02-07 ASSESSMENT — PAIN DESCRIPTION - PROGRESSION: CLINICAL_PROGRESSION: GRADUALLY WORSENING

## 2022-02-07 NOTE — PROGRESS NOTES
RT Evaluation for Home Oxygen    Resting (Room Air):  SpO2:  n/a  HR:      Resting (On O2):  SpO2:   93  HR:  97  O2 Device:  nasal cannula  O2 Flow Rate (L/min):  4  Comments:  mild dyspnea at rest    During Walk (On O2):  SpO2:  76  HR:  108  O2 Device:  nasal cannula  O2 Flow Rate (L/min):  4  Pt stWalk/Assistance Device:  none36  Rate of Dyspnea:  36  Symptoms:  dyspnea  Comments:    oxygen increased to 6 LNC and walk stopped for pt to sit      After Walk:  SpO2:  91  HR:  87  O2 Device:   nasal cannula  O2 Flow Rate (L/min):  4  FIO2 (%):    Rate of Dyspnea:  22  Symptoms:  mild dyspnea at rest  Comments:    Does the Patient Qualify for Home O2:    Liter Flow at Rest:  4  Liter Flow on Exertion:  6  Does the Patient Need Portable Oxygen Tanks:  pt states he has a concentrator at home that goes up to 10 liters    Additional Comments: dyspnea and oxygen increased oxygen requirements quickly resolved at rest-able to wean from [facility] to [facility] after walk ar rest within approx 3 mins    Electronically signed by Sarah Berry RN on 2/7/2022 at 2:10 PM

## 2022-02-07 NOTE — CARE COORDINATION
Care coordination: Walking pulse noted. Patient will need 6l/ nc with ambulation. Spoke with [employer] ([facility] oxygen [employer]) who states patient's latest order for supplemental oxygen is 6l/nc and patient does have a 10 liter concentrator at home. Will follow along with  and assist with discharge planning as necessary pending clinical course.      Beth Link RN

## 2022-02-07 NOTE — PROGRESS NOTES
Discharge instructions explained to pt and copy given. Both pt and wife at bedside verbalized understanding of instructions and are in agreement with dc plan to home with oxygen. Pt verbalized understanding of need for 6LNC oxygen continuously. Pt has oxygen tank/concentrater from home at bedside. Pt to be transported home with wife via private car. Transport notified of need for w/c assist off unit to car.

## 2022-02-07 NOTE — PROGRESS NOTES
Ambulated back from bathroom. On 5L NC;Sats 82%. SOB noted. Sat up to 90% after 1 minute of rest. Denies any pain.

## 2022-02-07 NOTE — PROGRESS NOTES
PROGRESS  NOTE --                                                          INTERNAL  MEDICINE                                                                              I  PERSONALLY SAW , EXAMINED, AND CARED [address], 2/7/2022     LABS, XRAY ,CHART, AND MEDICATIONS  REVIEWED BY ME       Chief complaint: Short of breath      2/3/2022-SUBJECTIVE: Albertina Oliver is alert awake and cooperative; oriented ×3. Denies any chest pain dyspnea nausea emesis. Tolerating diet. No abdominal pain. Sitting up in bed finishing his lunch. Throat feels better with lozenges. Less cough. I discussed at length coronavirus infection; advised him he does not have SARS-CoV-2. He states his wife has similar symptoms currently at home with sore throat minimal cough. Afebrile last 24 hours. SPO2 94 on 6 L nasal cannula. Intake and output -1000 cc. Fasting glucose 155 CRP 0.5 improving. Procalcitonin 0.11. A1c 5.4 TSH 0.858. WBC 11.1 hemoglobin 15.7. Iron 45, iron saturation 13, both low; TIBC normal ferritin 63. R81 folic acid normal.  ESR one. Respiratory culture pending. Hemoglobin is pending. Molecular/PCR viral respiratory panel positive for coronavirus 229E. Legionella and strep antigen presumptive negative. ID consult from yesterday appreciated. He completed 2 years of treatment with ethambutol rifampin moxifloxacin which was changed to clarithromycin. 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 He never sterilized his respiratory cultures. He was offered Arikayce but declined due to out-of-pocket expenses. Now on suppressive azithromycin 500 mg three times weekly. 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 ID note from today unchanged. Pulmonary note from today wean steroids to 40 mg every 6 hours.      2/4/2022-sitting up in bed, finishing his nebulizer treatment. States he feels much better overall. No chest pain minimal cough sore throat has improved. Appetite improving. Afebrile last 24 hours. SPO2 89 on 6 L nasal cannula. Intake and output +1564 cc. Fasting glucose 128 CRP 0.3. ESR 0. Sputum Gram stain as follows--    Value: Group 6: <25 PMN's/LPF and <25 Epithelial cells/LPF   Moderate Polymorphonuclear leukocytes   Few Epithelial cells   Moderate Gram positive cocci in clusters   Moderate Gram positive diplococci   Moderate Gram positive rods      Blood cultures with following results--     2/1/22 1155   Culture, Blood 2  Abnormal   24 Hours no growth   Gram stain performed from blood culture bottle media   Gram positive cocci in clusters         ID note from yesterday appreciated; resume azithromycin 3 times weekly for suppressive MAC therapy. Pulmonary note from yesterday unchanged. ID note from today occasional cough tolerating antibiotics. Hemoglobin is pending; tetanus and pneumococcal antibodies pending. The above blood culture likely contaminant. 2/5/2022-patient sitting in bedside chair; no chest pain still has some tightness from his breathing problems. He notices he is urinating frequently at this time; has been consuming normal amounts of food and water. Still becomes short of breath with activity. No diarrhea no nausea. Afebrile last 24 hours. SPO2 90 on 6 L nasal cannula. Intake and output +1119 cc. Fasting glucose 105. CRP 0.3. Hemoglobin 15.2 WC 6.6. Pulmonary note from yesterday appreciated. Wean steroids to 40 mg IV every 12 hours continue nebulizers. Hopefully discharge soon. ID note from today, feeling a little better still some cough brownish-yellow sputum. Continue suppressive azithromycin. IgG IgA IgM IgE all pending; tetanus and pneumococcal antibodies pending. 2/6/2022-patient sitting in bedside chair just finished his lunch.   No chest pain no dyspnea at rest.  Still gets short of breath with activity. Less urination with discontinuation of IV. No nausea vomiting diarrhea. Feeling better. Afebrile last 24 hours. SPO2 93 on 5 L nasal cannula. Intake and output inaccurate. Fasting glucose 105. CRP 0.3. Liver functions normal.  Hemoglobin 16.2 WBC 5.6. ESR 2. IgG slightly low at 675 IgA IgM both normal.  Pulmonary note from yesterday Taper steroids as tolerated consider switching to p.o. in another day. ID note from today essentially unchanged. 2/7/2022-patient sitting in bedside chair; no chest pain minimal dyspnea with exertion not at rest.  Appetite good no diarrhea no excess urination. Hoping for discharge. SPO2 93 on 4 L nasal cannula. Afebrile last 24 hours. Intake and output inaccurate. ESR 1. WBC 9.3. Remainder of labs pending. Pulmonary note from today, try p.o. steroids tomorrow. Last evening ambulated to the bathroom on 5 L with saturation 82%; SPO2 90% after 1 minute rest.  ID note from today, feeling good minimal cough. Continue azithromycin Monday Wednesday Friday. Tetanus antibody 1.2, protective. Pneumococcal antibodies pending.     Objective:     PHYSICAL EXAM:    VS: /80   Pulse 91   Temp 98.4 °F (36.9 °C) (Oral)   Resp 20   Ht 6' 1\" (1.854 m)   Wt 191 lb 8 oz (86.9 kg)   SpO2 93%   BMI 25.27 kg/m²     Labs:   CBC:   Lab Results   Component Value Date    WBC 9.3 02/07/2022    RBC 5.58 02/07/2022    HGB 17.0 02/07/2022    HCT 50.7 02/07/2022    MCV 90.9 02/07/2022    MCH 30.5 02/07/2022    MCHC 33.5 02/07/2022    RDW 14.3 02/07/2022     02/07/2022    MPV 9.8 02/07/2022     CBC with Differential:    Lab Results   Component Value Date    WBC 9.3 02/07/2022    RBC 5.58 02/07/2022    HGB 17.0 02/07/2022    HCT 50.7 02/07/2022     02/07/2022    MCV 90.9 02/07/2022    MCH 30.5 02/07/2022    MCHC 33.5 02/07/2022    RDW 14.3 02/07/2022    NRBC 0.0 02/04/2022    LYMPHOPCT 9.8 02/07/2022    MONOPCT 7.5 02/07/2022    MYELOPCT 0.9 04/07/2021    BASOPCT 0.1 02/07/2022    MONOSABS 0.69 02/07/2022    LYMPHSABS 0.91 02/07/2022    EOSABS 0.01 02/07/2022    BASOSABS 0.01 02/07/2022     Hemoglobin/Hematocrit:    Lab Results   Component Value Date    HGB 17.0 02/07/2022    HCT 50.7 02/07/2022     CMP:    Lab Results   Component Value Date     02/06/2022    K 4.3 02/06/2022    K 4.0 02/01/2022    CL 99 02/06/2022    CO2 27 02/06/2022    BUN 17 02/06/2022    CREATININE 0.7 02/06/2022    GFRAA >60 02/06/2022    LABGLOM >60 02/06/2022    GLUCOSE 105 02/06/2022    PROT 5.9 02/06/2022    LABALBU 3.8 02/06/2022    CALCIUM 9.6 02/06/2022    BILITOT 0.5 02/06/2022    ALKPHOS 59 02/06/2022    AST 18 02/06/2022    ALT 11 02/06/2022     BMP:    Lab Results   Component Value Date     02/06/2022    K 4.3 02/06/2022    K 4.0 02/01/2022    CL 99 02/06/2022    CO2 27 02/06/2022    BUN 17 02/06/2022    LABALBU 3.8 02/06/2022    CREATININE 0.7 02/06/2022    CALCIUM 9.6 02/06/2022    GFRAA >60 02/06/2022    LABGLOM >60 02/06/2022    GLUCOSE 105 02/06/2022     Hepatic Function Panel:    Lab Results   Component Value Date    ALKPHOS 59 02/06/2022    ALT 11 02/06/2022    AST 18 02/06/2022    PROT 5.9 02/06/2022    BILITOT 0.5 02/06/2022    BILIDIR <0.2 02/06/2022    IBILI see below 02/06/2022    LABALBU 3.8 02/06/2022     Ionized Calcium:  No results found for: IONCA  Magnesium:    Lab Results   Component Value Date    MG 2.0 02/06/2022     Phosphorus:    Lab Results   Component Value Date    PHOS 3.4 02/06/2022     LDH:    Lab Results   Component Value Date     01/02/2021     Uric Acid:    Lab Results   Component Value Date    LABURIC 4.8 02/02/2022     PT/INR:    Lab Results   Component Value Date    PROTIME 12.5 05/30/2019    INR 1.1 05/30/2019     Warfarin PT/INR:  No components found for: PTPATWAR, PTINRWAR  PTT:    Lab Results   Component Value Date    APTT 31.4 05/30/2019   [APTT}  Troponin:    Lab Results   Component Value Date    TROPONINI <0.01 04/08/2021     Last 3 Troponin:    Lab Results   Component Value Date    TROPONINI <0.01 04/08/2021    TROPONINI <0.01 04/08/2021    TROPONINI <0.01 01/02/2021     U/A:    Lab Results   Component Value Date    COLORU Yellow 02/01/2022    PROTEINU Negative 02/01/2022    PHUR 5.5 02/01/2022    LABCAST FEW 10/02/2019    WBCUA NONE 04/07/2021    RBCUA NONE 04/07/2021    MUCUS Present 04/07/2021    BACTERIA FEW 04/07/2021    CLARITYU Clear 02/01/2022    SPECGRAV 1.020 02/01/2022    LEUKOCYTESUR Negative 02/01/2022    UROBILINOGEN 0.2 02/01/2022    BILIRUBINUR MODERATE 02/01/2022    BLOODU Negative 02/01/2022    GLUCOSEU Negative 02/01/2022     HgBA1c:    Lab Results   Component Value Date    LABA1C 5.4 02/01/2022     FLP:    Lab Results   Component Value Date    TRIG 41 02/02/2022    HDL 61 02/02/2022    LDLCALC 72 02/02/2022    LABVLDL 8 02/02/2022     TSH:    Lab Results   Component Value Date    TSH 0.858 02/02/2022     VITAMIN B12: No components found for: B12  FOLATE:    Lab Results   Component Value Date    FOLATE 12.7 02/01/2022     IRON:    Lab Results   Component Value Date    IRON 45 02/02/2022     Iron Saturation:  No components found for: PERCENTFE  TIBC:    Lab Results   Component Value Date    TIBC 339 02/02/2022     FERRITIN:    Lab Results   Component Value Date    FERRITIN 63 02/02/2022        General appearance: Alert, Awake, Oriented times 3, no distress; nasal cannula oxygen in place  Skin: Warm and dry ; no rashes  Head: Normocephalic. No masses, lesions or tenderness noted  Eyes: Conjunctivae pink, sclera white. PERRL,EOM-I  Ears: External ears normal  Nose/Sinuses: Nares normal. Septum midline. Mucosa normal. No drainage  Oropharynx: Oropharynx clear with no exudate seen  Neck: Supple. No jugular venous distension, lymphadenopathy or thyromegaly Trachea midline  Lungs: Mostly clear  Heart: S1 S2  Regular rate and rhythm. No rub, murmur or gallop  Abdomen: Soft, non-tender.  BS normal. No masses, organomegaly; no rebound or guarding  Extremities: No edema, Peripheral pulses palpable  Musculoskeletal: Muscular strength appears intact. Neuro:  No focal motor defects ; II-XII grossly intact .  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)    Acute respiratory failure with hypoxia (HCC)    Coronavirus infection    Immunocompromised ([facility] [address]    Iron deficiency  Resolved Problems:    Thoracic ascending aortic aneurysm (HCC)    Acute hypoxemic respiratory failure (HCC)      PLAN:  SEE ORDERS      RE  CHANGES AND FINDINGS   Medications reviewed with patient  GI prophylaxis  DVT prophylaxis  Consultants notes reviewed  Ferrous sulfate 325 mg twice daily due to iron deficiency  Await results of respiratory culture and immunoglobin studies  Arformoterol 15 mcg twice daily  Budesonide 250 mcg twice daily  Cefepime and doxycycline have been discontinued  DuoNeb every 4 hours while awake  Methylprednisolone decreased to 40 mg IV every 6 hours  Oxycodone 7.5 mg every 12 hours as needed  Continue arformoterol budesonide and DuoNeb  Azithromycin 500 mg Monday Wednesday Friday  Ibuprofen 400 mg 3 times daily  Methylprednisolone 40 mg IV every 6 hours  Percocet 7.5 mg twice daily as needed  Await immunoglobin's, tetanus and pneumococcal antibodies  Monitor labs  2/5/2022  Azithromycin 500 mg Monday Wednesday Friday  Arformoterol 15 mcg twice daily  Budesonide 250 mcg twice daily  DuoNeb every 4 hours while awake  Methylprednisolone 40 mg IV every 12 hours  0.9 sodium chloride 75 cc an hour will be discontinued since oral intake is adequate  Percocet 7.5, every 12 hours as needed for severe pain  2/6/2022  Azithromycin 500 mg Monday Wednesday Friday, suppression  Continue arformoterol budesonide and DuoNeb nebulizer  Methylprednisolone 40 mg IV every 12 hours  Percocet 7.5 every 12 hours as needed for pain  2/7/2022  Med reconciliation completed  Prescriptions written  Prednisone 30 mg daily x5 days then 20 mg daily x5 days then 10 mg daily x5 days then stop  Resume home nebulizers and MDI  Follow-up Dr. Mercedes Beckford 1 week  Follow-up pulmonary per their instructions  Azithromycin 500 mg Monday Wednesday Friday, suppression      Discussed with patient and nursing. Amor Jaimes,      12:12 PM     2/7/2022    TIME > 25 MINUTES    >  50 %  OF  TIME  DISCUSSION               ------------  INFORMATION  -----------      DIET:ADULT DIET; Regular      No Known Allergies      MEDICATION SIDE EFFECTS:none       SCHEDULED MEDS:                                 Scheduled Meds:   predniSONE  30 mg Oral Daily    ferrous sulfate  325 mg Oral BID WC    azithromycin  500 mg Oral Once per day on Mon Wed Fri    ibuprofen  400 mg Oral TID     Arformoterol Tartrate  15 mcg Nebulization BID    budesonide  250 mcg Nebulization BID    ipratropium-albuterol  1 ampule Inhalation Q4H WA    enoxaparin  40 mg SubCUTAneous Daily    pantoprazole  40 mg Oral QAM AC       Continuous Infusions:        Data:     No intake or output data in the 24 hours ending 02/07/22 1212    Wt Readings from Last 3 Encounters:   02/07/22 191 lb 8 oz (86.9 kg)   01/18/22 186 lb (84.4 kg)   01/13/22 184 lb (83.5 kg)       Labs: Additional    GLUCOSE:No results for input(s): POCGLU in the last 72 hours. BNP:No results found for: BNP    CRP:   Recent Labs     02/05/22  0407 02/06/22  0321 02/07/22  0443   CRP 0.3 0.3 0.3       ESR:  Recent Labs     02/05/22  0407 02/06/22  0321 02/07/22  0525   SEDRATE 0 2 1       RADIOLOGY: REVIEWED AVAILABLE REPORT  XR CHEST PORTABLE   Final Result   1. Advanced emphysematous changes. 2. Patchy bibasilar opacities which could represent chronic scarring or   possibly pneumonia   3.  Chronic elevation of the right hemidiaphragm with pleuroparenchymal   scarring within the right lung base.                    [address],     12:12 PM     2/7/2022      Voice recognition software used for dictation

## 2022-02-07 NOTE — PROGRESS NOTES
[address] [place] [address] [facility]  [facility]  Progress Note    SUBJECTIVE:  Chief Complaint   Patient presents with    Shortness of Breath     hx copd, hypoxic on home 3L NC, cough, headache, bodyaches, x3 days     Feeling good this morning  Down to 4LNC. Minimal cough  Tolerating suppressive antibiotics. Review of systems:  As stated above in the chief complaint, otherwise negative. Medications:  Scheduled Meds:   predniSONE  30 mg Oral Daily    ferrous sulfate  325 mg Oral BID WC    azithromycin  500 mg Oral Once per day on     ibuprofen  400 mg Oral TID     Arformoterol Tartrate  15 mcg Nebulization BID    budesonide  250 mcg Nebulization BID    ipratropium-albuterol  1 ampule Inhalation Q4H WA    enoxaparin  40 mg SubCUTAneous Daily    pantoprazole  40 mg Oral QAM AC     Continuous Infusions:    PRN Meds:benzocaine-menthol, guaiFENesin-dextromethorphan, ondansetron, sodium chloride, phenylephrine, acetaminophen, oxyCODONE-acetaminophen **AND** oxyCODONE, benzonatate    OBJECTIVE:  /80   Pulse 91   Temp 98.4 °F (36.9 °C) (Oral)   Resp 20   Ht 6' 1\" (1.854 m)   Wt 191 lb 8 oz (86.9 kg)   SpO2 93%   BMI 25.27 kg/m²   Temp  Av.8 °F (36.6 °C)  Min: 97.5 °F (36.4 °C)  Max: 98.4 °F (36.9 °C)  Constitutional: The patient is awake, alert, and oriented. Sitting up in the bed, in no distress. Skin: Warm and dry. No rashes were noted. HEENT: Round and reactive pupils. Moist mucous membranes. No ulcerations or thrush. Neck: Supple to movements. Chest: No respiratory distress. Symmetrical expansion. 4LNC. Diminished bibasilar  Cardiovascular: S1 and S2 are rhythmic and regular. No murmurs appreciated. Abdomen: Positive bowel sounds to auscultation. Soft, nondistended. nontender  Extremities: No edema.   Lines: peripheral    Laboratory and Tests Review:  Lab Results   Component Value Date    WBC 9.3 2022    WBC 5.6 2022    WBC 6.6 2022    HGB 17.0 (H) 02/07/2022    HCT 50.7 02/07/2022    MCV 90.9 02/07/2022     02/07/2022     Lab Results   Component Value Date    NEUTROABS 7.59 (H) 02/07/2022    NEUTROABS 4.58 02/06/2022    NEUTROABS 5.55 02/05/2022     No results found for: [facility]  Lab Results   Component Value Date    ALT 11 02/06/2022    AST 18 02/06/2022    ALKPHOS 59 02/06/2022    BILITOT 0.5 02/06/2022     Lab Results   Component Value Date     02/06/2022    K 4.3 02/06/2022    K 4.0 02/01/2022    CL 99 02/06/2022    CO2 27 02/06/2022    BUN 17 02/06/2022    CREATININE 0.7 02/06/2022    CREATININE 0.6 02/05/2022    CREATININE 0.7 02/04/2022    GFRAA >60 02/06/2022    LABGLOM >60 02/06/2022    GLUCOSE 105 02/06/2022    PROT 5.9 02/06/2022    LABALBU 3.8 02/06/2022    CALCIUM 9.6 02/06/2022    BILITOT 0.5 02/06/2022    ALKPHOS 59 02/06/2022    AST 18 02/06/2022    ALT 11 02/06/2022     Lab Results   Component Value Date    CRP 0.3 02/07/2022    CRP 0.3 02/06/2022    CRP 0.3 02/05/2022     Lab Results   Component Value Date    SEDRATE 1 02/07/2022    SEDRATE 2 02/06/2022    SEDRATE 0 02/05/2022     Radiology:  Noted     Microbiology:   Blood cultures: 1 bottle micrococcus - contaminant  Streptococcus pneumoniae/Legionella urine Ag: negative  Respiratory Viral Panel: Coronavirus 229E  Respiratory Culture: oral tonja - g/s showing moderate GPC in clusters, moderate GPD, moderate GPR   IgA 218  IgG 675  IgM 84    No results for input(s): PROCAL in the last 72 hours. Assessment:  · MAC infection of the lung. Treated with 3 antimycobacterial agents for 2 years. Now on suppressive Azithromycin 500 mg p.o. 3 times weekly  · Coronavirus 229E infection  · Mild IgG deficiency. CVID is unlikely     Plan:    · Continue suppressive Azithromycin   · Tetanus antibody - 1.2 -- protective   · Pneumococcal antibodies-pending  · Steroids and supportive treatment    MIGUE Dumont - CNP  11:22 AM  2/7/2022     Patient seen and examined.  I had a face to face encounter with the patient. Agree with exam.  Assessment and plan as outlined above and directed by me. Addition and corrections were done as deemed appropriate. My exam and plan include: The patient is doing well. He is back to 4 L which is her baseline for him. He is back on suppressive Azithromycin. He can be discharged from ID standpoint. He had a follow-up appointment with me tomorrow. This can be rescheduled for 2 months from now. Discussed with Dr. Hadley Pino.     Ron Mccracken MD  2/7/2022  12:49 PM

## 2022-02-08 ENCOUNTER — CARE COORDINATION (OUTPATIENT)
Dept: CASE MANAGEMENT | Age: 72
End: 2022-02-08

## 2022-02-08 DIAGNOSIS — J96.21 ACUTE ON CHRONIC RESPIRATORY FAILURE WITH HYPOXIA (HCC): Primary | ICD-10-CM

## 2022-02-08 LAB
PNEUMOCOCCAL ANTIBODY TYPE 12F: 0.08 UG/ML
PNEUMOCOCCAL ANTIBODY TYPE 14: 1 UG/ML
PNEUMOCOCCAL ANTIBODY TYPE 18C: 13.06 UG/ML
PNEUMOCOCCAL ANTIBODY TYPE 19F: 2.9 UG/ML
PNEUMOCOCCAL ANTIBODY TYPE 1: >7.56 UG/ML
PNEUMOCOCCAL ANTIBODY TYPE 23F: 0.14 UG/ML
PNEUMOCOCCAL ANTIBODY TYPE 3: 0.65 UG/ML
PNEUMOCOCCAL ANTIBODY TYPE 4: 0.13 UG/ML
PNEUMOCOCCAL ANTIBODY TYPE 5: 5.44 UG/ML
PNEUMOCOCCAL ANTIBODY TYPE 6B: 0.6 UG/ML
PNEUMOCOCCAL ANTIBODY TYPE 7F: 0.65 UG/ML
PNEUMOCOCCAL ANTIBODY TYPE 8: 0.51 UG/ML
PNEUMOCOCCAL ANTIBODY TYPE 9N: 0.87 UG/ML
PNEUMOCOCCAL ANTIBODY TYPE 9V: 0.84 UG/ML
PNEUMOCOCCAL INTERPRETATION: NORMAL

## 2022-02-08 PROCEDURE — 1111F DSCHRG MED/CURRENT MED MERGE: CPT | Performed by: INTERNAL MEDICINE

## 2022-02-08 NOTE — PROGRESS NOTES
Physician Progress Note      PATIENT:               Nancy Snowden  CSN #:                  [id number]  :                       1950  ADMIT DATE:       2022 11:08 AM  [address] [facility] DATE:        2022 4:19 PM  RESPONDING  PROVIDER #:        Tanvi CASTAÑEDA DO          QUERY TEXT:    Dear Attending Physician,    Pt admitted with Acute Hypoxic Respiratory Failure,Coronavirus 229E. Noted   documentation of \" COPD Exacerbation\"  by ordered Pulmonology consultant. If   possible, please document in progress notes and discharge summary:    The medical record reflects the following:  Risk Factors:Hx COPD, Emphysema,  Pulmonarychronic  Mycobacterium avium   complex on suppressive Azithromycin  Clinical Indicators: Per Pulmo notes,\". .Acute Exacerbation COPD gold class 4   . Clayborne Hoguet Coronavirus 229E. Clayborne Hoguet .Mycobacterium avium complex . [place]borne Hoguet \"  Treatment: IV solumedrol,  nebulizers ,atb    Thank you,  Irwin Mata RN [facility]  Clinical Documentation Improvement Specialist  [phone]  Options provided:  -- COPD Exacerbation confirmed present on admission  -- COPD Exacerbation ruled out  -- Other - I will add my own diagnosis  -- Disagree - Not applicable / Not valid  -- Disagree - Clinically unable to determine / Unknown  -- Refer to Clinical Documentation Reviewer    PROVIDER RESPONSE TEXT:    The diagnosis of COPD Exacerbation was confirmed as present on admission.     Query created by: Chan Madison on 2022 4:44 PM      Electronically signed by:  Aleksandra Ash DO 2022 12:22 PM

## 2022-02-08 NOTE — CARE COORDINATION
[facility] Transitions Initial Follow Up Call    Call within 2 business days of discharge: Yes    Patient: Baron Eisenberg Patient : 1950   MRN: [id number]  Reason for Admission: acute on chronic respiratory failure with hypoxia  Discharge Date: 22 RARS: Readmission Risk Score: 10.7 ( )      Last Discharge [facility]       Complaint Diagnosis Description Type Department Provider    22 Shortness of Breath Pneumonia due to infectious organism, unspecified laterality, unspecified part of lung . .. ED to Hosp-Admission (Discharged) (ADMITTED) Moi Abebe DO; John José,... Transitions of Care Initial Call    Was this an external facility discharge? No Discharge Facility: N/A    Challenges to be reviewed by the provider   Additional needs identified to be addressed with provider: No  none             Method of communication with provider : none      Advance Care Planning:   Does patient have an Advance Directive: reviewed and current. Was this a readmission? No  Patient stated reason for admission: shortness of breath, cough and sore throat  Patients top risk factors for readmission: lack of knowledge about disease  level of motivation  medical condition-COPD, acute respiratory failure and PNA  polypharmacy    Care Transition Nurse (CTN) contacted the patient by telephone to perform post hospital discharge assessment. Verified name and  with patient as identifiers. Provided introduction to self, and explanation of the CTN role. CTN reviewed discharge instructions, medical action plan and red flags with patient who verbalized understanding. Patient given an opportunity to ask questions and does not have any further questions or concerns at this time. Were discharge instructions available to patient? Yes.  Reviewed appropriate site of care based on symptoms and resources available to patient including: PCP, Specialist, Urgent care clinics, When to call 911 and Condition related references. The patient agrees to contact the PCP office for questions related to their healthcare. Medication reconciliation was performed with patient, who verbalizes understanding of administration of home medications. Advised obtaining a 90-day supply of all daily and as-needed medications. Covid Risk Education     Educated patient about risk for severe COVID-19 due to risk factors according to CDC guidelines. CTN reviewed discharge instructions, medical action plan and red flag symptoms with the patient who verbalized understanding. Discussed COVID vaccination status: Yes and CTN confirmed with patient he is fully vaccinated for COVID plus booster. Education provided on COVID-19 vaccination as appropriate. Discussed exposure protocols and quarantine with CDC Guidelines. Patient was given an opportunity to verbalize any questions and concerns and agrees to contact CTN or health care provider for questions related to their healthcare. Reviewed and educated patient on any new and changed medications related to discharge diagnosis. Was patient discharged with a pulse oximeter? No Discussed and confirmed pulse oximeter discharge instructions and when to notify provider or seek emergency care. CTN provided contact information. Plan for follow-up call in 5-7 days based on severity of symptoms and risk factors. Plan for next call: symptom management-has shortness of breath improved?  follow up appointment-Did patient get scheduled for PCP, pulmonology and ID follow u appts? Non-face-to-face services provided:  Scheduled appointment with PCP-CTN confirmed with patient he will call and schedule follow up appt with Dr. Dilshad Foy (PCP) and declines needing CTN assistance. Scheduled appointment with Specialist-CTN confirmed with patient he will call and schedule follow up appts with Dr. Naif Sherman (pulmonology) and Dr. Marielle Reina (ID).   Obtained and reviewed discharge summary and/or continuity of care documents  Education of patient/family/caregiver/guardian to support self-management-Discussed COPD/PNA zone tools and knowing when to seek medical attention  Assessment and support for treatment adherence and medication management-Advised to take Zithromax and Prednisone as directed until completely finished. Advised that Ferrous Sulfat will turn sttols dark in color which is a common s/e which patient verbalizes understanding. Care Transitions 24 Hour Call    Schedule Follow Up Appointment with PCP: Declined  Do you have any ongoing symptoms?: Yes  Patient-reported symptoms: Shortness of Breath  Do you have a copy of your discharge instructions?: Yes  Do you have all of your prescriptions and are they filled?: Yes  Have you been contacted by a [facility] Pharmacist?: No  Have you scheduled your follow up appointment?: No  Were you discharged with any Home Care or Post Acute Services: No  Post Acute Services: Outpatient/Community Services (Comment: 1/9/21-oxygen through [facility])  Do you feel like you have everything you need to keep you well at home?: No  Care Transitions Interventions       Spoke with patient today 2/8/22 for TCM/hospital discharge (low risk) follow up. Patient states feeling better since discharge. States cough and sore throat has improved. States having ongoing shortness of breath with exertion such as when climbing steps. States wearing 5 lpm of home oxygen. States saturation while on phone with this CTN is 98% on oxygen at 5 lpm. Denies any chest pain, chest discomfort, abdominal pain, nausea, vomiting, diarrhea, constipation, chills or fever. Denies being a current smoker verbalizing he quit in 2014. Patient was negative for COVID on 2/1/22. Noted CXR obtained on admission showed the following below:     1. Advanced emphysematous changes. 2. Patchy bibasilar opacities which could represent chronic scarring or   possibly pneumonia   3.  Chronic elevation of the right hemidiaphragm with pleuroparenchymal   scarring within the right lung base. Provided a complete review of home meds with patient who confirms he obtained all new meds except Ferrous sulfate which he'll get OTC. Advised Ferrous sulfate will turn stool dark in color which is a common s/e. Advised to take Zithromax and Prednisone as directed until completely finished. 1111F code entered. Patient complains of chronic low back pain. States having sciatica surgery three years ago and having associated numbness to toes on left since surgery. Rates current pain 3-4/10 in severity which has improved after taking a Percocet this morning. Denies any numbness, tingling or loss of sensation in legs. Denies any loss of bowel/bladder function. Discussed COPD/PNA zone tools and knowing when to seek medical attention. Confirmed with patient he will call PCP, pulmonology and ID to schedule f/u appts. Declines needing any CTN assistance. Denies any other complaints at this time. CTN will continue to follow.      Follow Up  Future Appointments   Date Time Provider Tan Miranda   3/3/2022  8:45 AM Tacos Mauricio MD [facility]   4/18/2022  2:00 PM MD WARD [facility]   5/31/2022  2:00 PM MIGUE Rebolledo - NP [facility]   6/10/2022 10:45 AM DO ANJANA Mora [facility]   12/20/2022  1:30 PM MD WARD [facility]       MIGUE Martinez

## 2022-02-08 NOTE — DISCHARGE SUMMARY
DISCHARGE SUMMARY  Patient ID:  Abdelrahman Leslie  [id number]  24 y.o.  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 Sammy Bernardo I am asked to admit and follow. History obtained from patient as well as extensive review electronic record. Patient states the last few weeks becoming more short of breath no chest pains but does feel tight in the chest.  He has had intermittent cough as well as worsening sore throat. No fever chills night sweats. He has known severe COPD follows with pulmonary; chronically 3 L nasal cannula at home. Noted to have prior infection with Mycobacterium avium complex. Was first seen by ID with treatment started in September 2019. Upon presentation to the ED SPO2 was 71 on 3 L nasal cannula. Currently SPO2 91 on 6 L nasal cannula. Chest x-ray done admission with following results--          FINDINGS:   The cardiac silhouette is within normals. Significant emphysematous changes are noted. There is chronic consolidation within the right lung base which may represent   chronic elevation of the hemidiaphragm and chronic pleuroparenchymal scarring   within the right costophrenic angle.      Patchy bibasilar opacities are noted.  These findings could represent   pneumonia.  Chronic scarring is not excluded.        Impression:       1. Advanced emphysematous changes. 2. Patchy bibasilar opacities which could represent chronic scarring or   possibly pneumonia   3. Chronic elevation of the right hemidiaphragm with pleuroparenchymal   scarring within the right lung base.           --Procalcitonin 0.11. Today sodium 131 CO2 20. Ionized calcium 1.35 total calcium 9.7. Fasting glucose 158. Lactic acid 1.8. Uric acid 4.8.  proBNP 647. LDL 72. Albumin 3.9. A1c 5.4 TSH 0.858. WBC 4.8 hemoglobin 17.3. Platelets 205. Ferritin 63; iron and iron saturation both low at 45/13 respectively. Y74 folic acid normal.  ESR 2. D-dimer from yesterday 448. Molecular/PCR viral respiratory panel with positive for coronavirus 229 ED; Legionella and strep antigens presumptive negative. --Admission to the hospital 2021 due to acute heart failure with preserved ejection fraction  --Hospitalization 2021 due to acute respiratory failure due to SARS-CoV-2  --Found to have thoracic ascending aortic aneurysm 11/15/2018; proximal ascending aorta 3.8 cm  --Patient admitted 2019 due to parainfluenza virus pneumonia.  The above was extremely slow resolving with left upper lung infiltrate.  He then underwent bronchoscopy with BAL. --Fungal results became available approximately 2019, positive Fungitell. --On 2019 sensitivities were available and ID recommended starting intravenous amikacin 1400 mg IV daily; rifampin 600 mg by mouth daily; clarithromycin 1000 mg/day ethambutol 1200 mg daily by mouth.  --Mother  age 80, father  age 80. --Patient quit smoking , 92-pack-year history  --Patient has been vaccinated for COVID-19 and received booster      2/3/2022-SUBJECTIVE: Lukas Sadler is alert awake and cooperative; oriented ×3. Denies any chest pain dyspnea nausea emesis. Tolerating diet. No abdominal pain. Sitting up in bed finishing his lunch. Throat feels better with lozenges. Less cough. I discussed at length coronavirus infection; advised him he does not have SARS-CoV-2. He states his wife has similar symptoms currently at home with sore throat minimal cough. Afebrile last 24 hours. SPO2 94 on 6 L nasal cannula. Intake and output -1000 cc. Fasting glucose 155 CRP 0.5 improving. Procalcitonin 0.11. A1c 5.4 TSH 0.858. WBC 11.1 hemoglobin 15.7. Iron 45, iron saturation 13, both low; TIBC normal ferritin 63. A30 folic acid normal.  ESR one. Respiratory culture pending. Hemoglobin is pending. Molecular/PCR viral respiratory panel positive for coronavirus 229E. Legionella and strep antigen presumptive negative. ID consult from yesterday appreciated. He completed 2 years of treatment with ethambutol rifampin moxifloxacin which was changed to clarithromycin. 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 He never sterilized his respiratory cultures. He was offered Arikayce but declined due to out-of-pocket expenses. Now on suppressive azithromycin 500 mg three times weekly. 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 ID note from today unchanged. Pulmonary note from today wean steroids to 40 mg every 6 hours.      2/4/2022-sitting up in bed, finishing his nebulizer treatment. States he feels much better overall. No chest pain minimal cough sore throat has improved. Appetite improving. Afebrile last 24 hours. SPO2 89 on 6 L nasal cannula. Intake and output +1564 cc. Fasting glucose 128 CRP 0.3. ESR 0.   Sputum Gram stain as follows--     Value: Group 6: <25 PMN's/LPF and <25 Epithelial cells/LPF   Moderate Polymorphonuclear leukocytes   Few Epithelial cells   Moderate Gram positive cocci in clusters   Moderate Gram positive diplococci   Moderate Gram positive rods       Blood cultures with following results--       2/1/22 1155   Culture, Blood 2  Abnormal   24 Hours no growth   Gram stain performed from blood cannula. Afebrile last 24 hours. Intake and output inaccurate. ESR 1. WBC 9.3. Remainder of labs pending. Pulmonary note from today, try p.o. steroids tomorrow. Last evening ambulated to the bathroom on 5 L with saturation 82%; SPO2 90% after 1 minute rest.  ID note from today, feeling good minimal cough. Continue azithromycin Monday Wednesday Friday. Tetanus antibody 1.2, protective. Pneumococcal antibodies pending. Hospital orders:  Medications discussed with patient  GI prophylaxis  DVT prophylaxis  Pulmonary consultation  Arformoterol 15 mcg twice daily  Budesonide 250 mcg twice daily  Cefepime 2 g IV every 24 hours  Doxycycline 100 mg IV every 12 hours  DuoNeb every 4 hours while awake  Enoxaparin 40 mg daily  Molecular/PCR viral respiratory panel  Methylprednisolone 60 mg IV every 6 hours  Percocet 7.5 mg twice daily as needed  ID consult; he was post to follow-up with ID as outpatient 2/5/2022.       RE  CHANGES AND FINDINGS   Medications reviewed with patient  GI prophylaxis  DVT prophylaxis  Consultants notes reviewed  Ferrous sulfate 325 mg twice daily due to iron deficiency  Await results of respiratory culture and immunoglobin studies  Arformoterol 15 mcg twice daily  Budesonide 250 mcg twice daily  Cefepime and doxycycline have been discontinued  DuoNeb every 4 hours while awake  Methylprednisolone decreased to 40 mg IV every 6 hours  Oxycodone 7.5 mg every 12 hours as needed  Continue arformoterol budesonide and DuoNeb  Azithromycin 500 mg Monday Wednesday Friday  Ibuprofen 400 mg 3 times daily  Methylprednisolone 40 mg IV every 6 hours  Percocet 7.5 mg twice daily as needed  Await immunoglobin's, tetanus and pneumococcal antibodies  Monitor labs  2/5/2022  Azithromycin 500 mg Monday Wednesday Friday  Arformoterol 15 mcg twice daily  Budesonide 250 mcg twice daily  DuoNeb every 4 hours while awake  Methylprednisolone 40 mg IV every 12 hours  0.9 sodium chloride 75 cc an hour will be discontinued since oral intake is adequate  Percocet 7.5, every 12 hours as needed for severe pain  2/6/2022  Azithromycin 500 mg Monday Wednesday Friday, suppression  Continue arformoterol budesonide and DuoNeb nebulizer  Methylprednisolone 40 mg IV every 12 hours  Percocet 7.5 every 12 hours as needed for pain      Instructions at discharge:    2/7/2022  Med reconciliation completed  Prescriptions written  Prednisone 30 mg daily x5 days then 20 mg daily x5 days then 10 mg daily x5 days then stop  Resume home nebulizers and MDI  Follow-up Dr. Alison Dixon 1 week  Follow-up pulmonary per their instructions  Azithromycin 500 mg Monday Wednesday Friday, suppression      Condition at DISCHARGE : Jeff May      Discharged to: Home    Discharge Instructions: Medications reviewed with patient    Consults:pulmonary/intensive care and ID     Past Medical Hx :   Past Medical History:   Diagnosis Date    Acute and chronic respiratory failure with hypoxia ([address] 10/12/2017    Acute heart failure with preserved ejection fraction ([address] 4/8/2021    Acute respiratory disease due to severe acute respiratory syndrome coronavirus 2 (SARS-CoV-2) 01/01/2021    Acute respiratory failure with hypoxia (HCC) 11/12/2018    Bullous emphysema ([address] 11/12/2018    Chronic back pain     Degeneration of umbar intervertebral disc    Colon cancer screening     COPD (chronic obstructive pulmonary disease) ([address]     Coronavirus infection 2/2/2022    Coronavirus 229E    Cough with hemoptysis 04/23/2019    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 ([address] 08/15/2019    First seen by ID; treatment started 9/4/2019    Emphysema lung ([address]     Glucose intolerance 06/10/2019    Hilar adenopathy 06/10/2019    Hypophosphatemia 06/10/2019    Immunocompromised ([address] 2/3/2022    Infection due to parainfluenza virus 3 06/10/2019    Iron deficiency 2/3/2022    Left upper lobe pneumonia 10/12/2017    Pleural effusion on right 10/03/2019    Pulmonary emphysema with fibrosis of lung ([facility] [address] 11/15/2018    Pulmonary Mycobacterium avium complex (MAC) infection ([facility] [address] 07/12/2019    BAL results finally completed this date    Rhinovirus infection 10/16/2020    Right lower lobe pneumonia 11/15/2018    Recurrent 4/23/19    S/P lumbar fusion 06/01/2019    Thoracic ascending aortic aneurysm ([facility] [address] 11/15/2018    Proximal ascending aorta; 3.8 cm; 11/15/18       Past Surgical Hx :  Past Surgical History:   Procedure Laterality Date    ABSCESS DRAINAGE  2006    X2 RECTAL ABSCESS    BRONCHOSCOPY N/A 6/12/2019    BRONCHOSCOPY BRUSHINGS performed by Penny Rodrigez MD at [address] [place] [address] N/A 10/7/2019    BRONCHOSCOPY DIAGNOSTIC OR CELL [address] ONLY performed by Penny Rodrigez MD at [address] [place] [address] N/A 4/12/2021    BRONCHOSCOPY performed by Penny [facility]rigez MD at [facility] COLONOSCOPY  11/18/2013    HC INSERT PICC CATH, 5/> YRS  4/10/2021         LUMBAR FUSION  03/2019    [facility]       Labs:   CBC:   Lab Results   Component Value Date    WBC 9.3 02/07/2022    RBC 5.58 02/07/2022    HGB 17.0 02/07/2022    HCT 50.7 02/07/2022    MCV 90.9 02/07/2022    MCH 30.5 02/07/2022    MCHC 33.5 02/07/2022    RDW 14.3 02/07/2022     02/07/2022    MPV 9.8 02/07/2022     CBC with Differential:    Lab Results   Component Value Date    WBC 9.3 02/07/2022    RBC 5.58 02/07/2022    HGB 17.0 02/07/2022    HCT 50.7 02/07/2022     02/07/2022    MCV 90.9 02/07/2022    MCH 30.5 02/07/2022    MCHC 33.5 02/07/2022    RDW 14.3 02/07/2022    NRBC 0.0 02/04/2022    LYMPHOPCT 9.8 02/07/2022    MONOPCT 7.5 02/07/2022    MYELOPCT 0.9 04/07/2021    BASOPCT 0.1 02/07/2022    MONOSABS 0.69 02/07/2022    LYMPHSABS 0.91 02/07/2022    EOSABS 0.01 02/07/2022    BASOSABS 0.01 02/07/2022     Hemoglobin/Hematocrit:    Lab Results   Component Value Date    HGB 17.0 02/07/2022    HCT 50.7 02/07/2022     CMP: Lab Results   Component Value Date     02/06/2022    K 4.3 02/06/2022    K 4.0 02/01/2022    CL 99 02/06/2022    CO2 27 02/06/2022    BUN 17 02/06/2022    CREATININE 0.7 02/06/2022    GFRAA >60 02/06/2022    LABGLOM >60 02/06/2022    GLUCOSE 105 02/06/2022    PROT 5.9 02/06/2022    LABALBU 3.8 02/06/2022    CALCIUM 9.6 02/06/2022    BILITOT 0.5 02/06/2022    ALKPHOS 59 02/06/2022    AST 18 02/06/2022    ALT 11 02/06/2022     BMP:    Lab Results   Component Value Date     02/06/2022    K 4.3 02/06/2022    K 4.0 02/01/2022    CL 99 02/06/2022    CO2 27 02/06/2022    BUN 17 02/06/2022    LABALBU 3.8 02/06/2022    CREATININE 0.7 02/06/2022    CALCIUM 9.6 02/06/2022    GFRAA >60 02/06/2022    LABGLOM >60 02/06/2022    GLUCOSE 105 02/06/2022     Hepatic Function Panel:    Lab Results   Component Value Date    ALKPHOS 59 02/06/2022    ALT 11 02/06/2022    AST 18 02/06/2022    PROT 5.9 02/06/2022    BILITOT 0.5 02/06/2022    BILIDIR <0.2 02/06/2022    IBILI see below 02/06/2022    LABALBU 3.8 02/06/2022     Ionized Calcium:  No results found for: IONCA  Magnesium:    Lab Results   Component Value Date    MG 2.0 02/06/2022     Phosphorus:    Lab Results   Component Value Date    PHOS 3.4 02/06/2022     LDH:    Lab Results   Component Value Date     01/02/2021     Uric Acid:    Lab Results   Component Value Date    LABURIC 4.8 02/02/2022     PT/INR:    Lab Results   Component Value Date    PROTIME 12.5 05/30/2019    INR 1.1 05/30/2019     Warfarin PT/INR:  No components found for: PTPATWAR, PTINRWAR  PTT:    Lab Results   Component Value Date    APTT 31.4 05/30/2019   [APTT}  Troponin:    Lab Results   Component Value Date    TROPONINI <0.01 04/08/2021     Last 3 Troponin:    Lab Results   Component Value Date    TROPONINI <0.01 04/08/2021    TROPONINI <0.01 04/08/2021    TROPONINI <0.01 01/02/2021     U/A:    Lab Results   Component Value Date    COLORU Yellow 02/01/2022    PROTEINU Negative 02/01/2022 PHUR 5.5 02/01/2022    LABCAST FEW 10/02/2019    WBCUA NONE 04/07/2021    RBCUA NONE 04/07/2021    MUCUS Present 04/07/2021    BACTERIA FEW 04/07/2021    CLARITYU Clear 02/01/2022    SPECGRAV 1.020 02/01/2022    LEUKOCYTESUR Negative 02/01/2022    UROBILINOGEN 0.2 02/01/2022    BILIRUBINUR MODERATE 02/01/2022    BLOODU Negative 02/01/2022    GLUCOSEU Negative 02/01/2022     ABG:    Lab Results   Component Value Date    PH 7.440 05/28/2019    PCO2 32.1 05/28/2019    PO2 59.1 05/28/2019    HCO3 21.3 05/28/2019    BE -1.8 05/28/2019    O2SAT 91.6 05/28/2019     HgBA1c:    Lab Results   Component Value Date    LABA1C 5.4 02/01/2022     FLP:    Lab Results   Component Value Date    TRIG 41 02/02/2022    HDL 61 02/02/2022    LDLCALC 72 02/02/2022    LABVLDL 8 02/02/2022     TSH:    Lab Results   Component Value Date    TSH 0.858 02/02/2022     VITAMIN B12: No components found for: B12  FOLATE:    Lab Results   Component Value Date    FOLATE 12.7 02/01/2022     IRON:    Lab Results   Component Value Date    IRON 45 02/02/2022     Iron Saturation:  No components found for: PERCENTFE  TIBC:    Lab Results   Component Value Date    TIBC 339 02/02/2022     FERRITIN:    Lab Results   Component Value Date    FERRITIN 63 02/02/2022     LIPASE:    Lab Results   Component Value Date    LIPASE 16 05/30/2019          CBC:  Recent Labs     02/06/22  0321 02/07/22  0525   WBC 5.6 9.3   RBC 5.44 5.58   HGB 16.2 17.0*   HCT 48.9 50.7    189   MCV 89.9 90.9   MCH 29.8 30.5   MCHC 33.1 33.5   RDW 14.4 14.3   LYMPHOPCT 11.3* 9.8*   MONOPCT 6.4 7.5   BASOPCT 0.2 0.1   MONOSABS 0.36 0.69   LYMPHSABS 0.64* 0.91*   EOSABS 0.00* 0.01*   BASOSABS 0.01 0.01          H & H :  Recent Labs     02/06/22  0321 02/07/22  0525   HGB 16.2 17.0*       TSH:No results for input(s): TSH in the last 72 hours. GLUCOSE:No results for input(s): POCGLU in the last 72 hours.     CMP:  Recent Labs     02/06/22  0321      K 4.3   CL 99   CO2 27 BUN 17   CREATININE 0.7   GFRAA >60   LABGLOM >60   GLUCOSE 105*   PROT 5.9*   LABALBU 3.8   CALCIUM 9.6   BILITOT 0.5   ALKPHOS 59   AST 18   ALT 11         BNP:No results found for: BNP    PROTIME/INR:No results for input(s): PROTIME, INR in the last 72 hours. CRP:   Recent Labs     02/06/22  0321 02/07/22  0443   CRP 0.3 0.3       ESR:  Recent Labs     02/06/22  0321 02/07/22  0525   SEDRATE 2 1       LIPASE , AMYLASE:  Lab Results   Component Value Date    LIPASE 16 05/30/2019      No results found for: AMYLASE    ABGs: No results found for: PHART, PO2ART, [id number]    CARDIAC: No results for input(s): CKTOTAL, CKMB, CKMBINDEX, TROPONINI in the last 72 hours. Lipid Profile:   Lab Results   Component Value Date    TRIG 41 02/02/2022    HDL 61 02/02/2022    LDLCALC 72 02/02/2022    CHOL 141 02/02/2022        XR CHEST PORTABLE    Result Date: 2/1/2022  EXAMINATION: ONE XRAY VIEW OF THE CHEST 2/1/2022 11:34 am COMPARISON: 04/25/2021 HISTORY: ORDERING SYSTEM PROVIDED HISTORY: shortness of breath TECHNOLOGIST PROVIDED HISTORY: Reason for exam:->shortness of breath FINDINGS: The cardiac silhouette is within normals. Significant emphysematous changes are noted. There is chronic consolidation within the right lung base which may represent chronic elevation of the hemidiaphragm and chronic pleuroparenchymal scarring within the right costophrenic angle. Patchy bibasilar opacities are noted. These findings could represent pneumonia. Chronic scarring is not excluded. 1. Advanced emphysematous changes. 2. Patchy bibasilar opacities which could represent chronic scarring or possibly pneumonia 3. Chronic elevation of the right hemidiaphragm with pleuroparenchymal scarring within the right lung base.        Discharge Exam:  See progress note from today    Discharge Medication List as of 2/7/2022  3:33 PM      START taking these medications    Details   predniSONE (DELTASONE) 10 MG tablet 30 mg daily for 5 days then 20 mg daily for 5 days then 10 mg daily for 5 days and stop, Disp-30 tablet, R-0Normal      benzonatate (TESSALON) 100 MG capsule Take 1 capsule by mouth 3 times daily as needed for Cough, Disp-30 capsule, R-1Normal      guaiFENesin-dextromethorphan (ROBITUSSIN DM) 100-10 MG/5ML syrup Take 5 mLs by mouth every 4 hours as needed for Cough, Disp-120 mLOTC      ferrous sulfate (IRON 325) 325 (65 Fe) MG tablet Take 1 tablet by mouth 2 times daily (with meals), Disp-30 tablet, R-3Normal      pantoprazole (PROTONIX) 40 MG tablet Take 1 tablet by mouth every morning (before breakfast), Disp-30 tablet, R-3Normal         CONTINUE these medications which have CHANGED    Details   azithromycin (ZITHROMAX) 500 MG tablet Take 1 tablet by mouth three times a week for 10 days, Disp-30 tablet, R-1Normal         CONTINUE these medications which have NOT CHANGED    Details   Probiotic Product (PROBIOTIC-10 PO) Take by mouthHistorical Med      etodolac (LODINE) 500 MG tablet Take 500 mg by mouth dailyHistorical Med      Fluticasone-Umeclidin-Vilant (TRELEGY ELLIPTA IN) Inhale 1 puff into the lungs dailyHistorical Med      Handicap Placard [facility] [place] Starting Thu 8/19/2021, Disp-1 each, R-0, PrintPatient cannot walk 200 ft without stopping to rest.   Expiration 5 yrs. ipratropium-albuterol (DUONEB) 0.5-2.5 (3) MG/3ML SOLN nebulizer solution Inhale 3 mLs into the lungs every 4 hours (while awake), Disp-360 mLDC to [facility]      Respiratory Therapy Supplies (FULL KIT NEBULIZER SET) MISC Disp-1 each, R-0, PrintUse as directed with nebulized medication. oxyCODONE-acetaminophen (PERCOCET) 7.5-325 MG per tablet Take 1 tablet by mouth 2 times daily as needed for Pain.  Historical Med         STOP taking these medications       moxifloxacin (AVELOX) 400 MG tablet Comments:   Reason for Stopping:               Time Spent on discharge is more than 30 minutes --      TIME  INCLUDES TIME THAT WAS  SPENT WITH DISCHARGE PAPERS, MEDICATION REVIEW, MEDICATION RECONCILIATION,   PRESCRIPTIONS, CHART REVIEW, PATIENT EXAM , FINAL PROGRESS NOTE, DISCUSSION OF FINDINGS WITH PATIENT AND AVAILABLE FAMILY , AND DICTATION  WHERE NEEDED ; Home Health Care [facility]) FORMS COMPLETED ; N-17  COMPLETION ;  H&P UPDATED ; DURABLE EQUIPMENT FORMS.      Active Hospital Problems    Diagnosis     Immunocompromised ([address] [D84.9]     Iron deficiency [E61.1]     Coronavirus infection [B34.2]     Acute respiratory failure with hypoxia ([address] [J96.01]     Acute on chronic respiratory failure with hypoxia ([address] [J96.21]     Pulmonary Mycobacterium avium complex (MAC) infection ([address] [A31.0]     Bullous emphysema ([address] [J43.9]     COPD (chronic obstructive pulmonary disease) ([address] [J44.9]        Signed:    Elodia Skaggs DO      2/8/2022    2:02 PM    Voice recognition software use for dictation

## 2022-02-10 LAB — IGE: 27 KU/L

## 2022-02-15 ENCOUNTER — CARE COORDINATION (OUTPATIENT)
Dept: CASE MANAGEMENT | Age: 72
End: 2022-02-15

## 2022-02-15 ENCOUNTER — OFFICE VISIT (OUTPATIENT)
Dept: PRIMARY CARE CLINIC | Age: 72
End: 2022-02-15
Payer: MEDICARE

## 2022-02-15 VITALS
WEIGHT: 189 LBS | OXYGEN SATURATION: 88 % | BODY MASS INDEX: 25.05 KG/M2 | TEMPERATURE: 98 F | DIASTOLIC BLOOD PRESSURE: 60 MMHG | HEART RATE: 104 BPM | SYSTOLIC BLOOD PRESSURE: 110 MMHG | HEIGHT: 73 IN | RESPIRATION RATE: 18 BRPM

## 2022-02-15 DIAGNOSIS — J44.9 CHRONIC OBSTRUCTIVE PULMONARY DISEASE, UNSPECIFIED COPD TYPE (HCC): ICD-10-CM

## 2022-02-15 DIAGNOSIS — J96.21 ACUTE ON CHRONIC RESPIRATORY FAILURE WITH HYPOXIA (HCC): Primary | ICD-10-CM

## 2022-02-15 DIAGNOSIS — D75.1 ERYTHROCYTOSIS DUE TO HYPOXEMIA: ICD-10-CM

## 2022-02-15 DIAGNOSIS — E61.1 IRON DEFICIENCY: ICD-10-CM

## 2022-02-15 DIAGNOSIS — D84.9 IMMUNOCOMPROMISED (HCC): ICD-10-CM

## 2022-02-15 PROBLEM — J84.10 PULMONARY EMPHYSEMA WITH FIBROSIS OF LUNG (HCC): Status: RESOLVED | Noted: 2018-11-15 | Resolved: 2022-02-15

## 2022-02-15 PROBLEM — J43.9 BULLOUS EMPHYSEMA (HCC): Status: RESOLVED | Noted: 2018-11-12 | Resolved: 2022-02-15

## 2022-02-15 PROBLEM — B34.2 CORONAVIRUS INFECTION: Status: RESOLVED | Noted: 2022-02-02 | Resolved: 2022-02-15

## 2022-02-15 PROBLEM — J96.01 ACUTE RESPIRATORY FAILURE WITH HYPOXIA (HCC): Status: RESOLVED | Noted: 2022-02-01 | Resolved: 2022-02-15

## 2022-02-15 PROBLEM — J43.9 PULMONARY EMPHYSEMA WITH FIBROSIS OF LUNG (HCC): Status: RESOLVED | Noted: 2018-11-15 | Resolved: 2022-02-15

## 2022-02-15 PROCEDURE — 99496 TRANSJ CARE MGMT HIGH F2F 7D: CPT | Performed by: INTERNAL MEDICINE

## 2022-02-15 PROCEDURE — 1111F DSCHRG MED/CURRENT MED MERGE: CPT | Performed by: INTERNAL MEDICINE

## 2022-02-15 NOTE — CARE COORDINATION
[facility] Transitions Follow Up Call    2/15/2022  Patient: Baron Eisenberg  Patient : 1950   MRN: [id number]  Reason for Admission: Acute on chronic respiratory failure with hypoxia  Discharge Date: 22 RARS: Readmission Risk Score: 10.7 ( )    Care Transitions Follow Up Call    Needs to be reviewed by the provider   Additional needs identified to be addressed with provider: No  none             Method of communication with provider : none      Care Transition Nurse (CTN) contacted the patient by telephone to follow up after admission on 22. Verified name and  with patient as identifiers. Addressed changes since last contact: none  Discussed follow-up appointments. If no appointment was previously scheduled, appointment scheduling offered: Yes. Is follow up appointment scheduled within 7 days of discharge? No.    Advance Care Planning:   Does patient have an Advance Directive: reviewed and current. CTN reviewed discharge instructions, medical action plan and red flags with patient and discussed any barriers to care and/or understanding of plan of care after discharge. Discussed appropriate site of care based on symptoms and resources available to patient including: PCP, Specialist, Urgent care clinics, When to call 911 and Condition related references. The patient agrees to contact the PCP office for questions related to their healthcare. Patients top risk factors for readmission: lack of knowledge about disease  medical condition-COPD, acute respiratory failure, and PNA  polypharmacy    Non-[facility] follow up appointment(s): CTN  Confirmed with patient he is scheduled to follow up with Dr. Gian Sanabria (PCP) today 2/15/22 at 2:30 pm.     Plan for follow-up call in 7-10 days based on severity of symptoms and risk factors.       Care Transitions Subsequent and Final Call    Subsequent and Final Calls  Do you have any ongoing symptoms?: No  Have your medications changed?: No  Do you have any questions related to your medications?: No  Do you currently have any active services?: No  Are you currently active with any services?: Outpatient/Community Services  Do you have any needs or concerns that I can assist you with?: No  Identified Barriers: Lack of Education  Care Transitions Interventions  No Identified Needs  Other Interventions:         Spoke with patient today 2/15/2 for TCM/hospital discharge (low risk) sub call follow up. Patient states breathing has improved since last CTN call and has weaned off of home oxygen. States saturation while on phone with this CTN is 92% on R/A. CTN advised if saturation goes any lower he is to put oxygen back on which he verbalizes understanding. Denies any chest pain, chest discomfort, abdominal pain, nausea, vomiting, chills or fever. Confirmed with patient he continues on Prednisone therapy which he is tolerating well. Reiterated COPD zone tool and knowing when to seek medical attention. Confirmed with patient he is scheduled for HFU appt with Dr. Harpreet Gore (PCP) today 2/15/22 at 2:30 pm. Patient states he is scheduled to f/u with Dr. Ruby Adam (pulmonology) on 2/24/22 and with Dr. Antonio Banda (ID) on 3/2/22. Denies any needs or concerns at this time. CTN will continue to follow for Care Transition. Follow Up  Future Appointments   Date Time Provider Tan Miranda   2/15/2022  2:30 PM MD WARD Huerta [facility]   2/24/2022  1:00 PM MIGUE Burleson - NP [facility]   3/3/2022  8:45 AM Shayne Duckworth MD [facility]   4/18/2022  2:00 PM MD WARD Huerta [facility]   5/31/2022  2:00 PM MIGUE Burleson NP [facility]   6/10/2022 10:45 AM DO James Gomez ENT [facility]   12/20/2022  1:30 PM MD WARD Huerta [facility]     CTN provided contact information for future needs.      MIGUE Coulter

## 2022-02-15 NOTE — PROGRESS NOTES
Abdelrahman Mariama  2/15/22     Chief Complaint   Patient presents with   [address] Management     2/1/22-2/7/22    Acute respiratory failure with hypoxia         No Known Allergies     Current Outpatient Medications   Medication Sig Dispense Refill    predniSONE (DELTASONE) 10 MG tablet 30 mg daily for 5 days then 20 mg daily for 5 days then 10 mg daily for 5 days and stop 30 tablet 0    guaiFENesin-dextromethorphan (ROBITUSSIN DM) 100-10 MG/5ML syrup Take 5 mLs by mouth every 4 hours as needed for Cough 120 mL     ferrous sulfate (IRON 325) 325 (65 Fe) MG tablet Take 1 tablet by mouth 2 times daily (with meals) 30 tablet 3    azithromycin (ZITHROMAX) 500 MG tablet Take 1 tablet by mouth three times a week for 10 days 30 tablet 1    pantoprazole (PROTONIX) 40 MG tablet Take 1 tablet by mouth every morning (before breakfast) 30 tablet 3    Probiotic Product (PROBIOTIC-10 PO) Take by mouth daily       Handicap Placard MISC by Does not apply route Patient cannot walk 200 ft without stopping to rest.    Expiration 5 yrs. 1 each 0    etodolac (LODINE) 500 MG tablet Take 500 mg by mouth daily      Fluticasone-Umeclidin-Vilant (TRELEGY ELLIPTA IN) Inhale 1 puff into the lungs daily      ipratropium-albuterol (DUONEB) 0.5-2.5 (3) MG/3ML SOLN nebulizer solution Inhale 3 mLs into the lungs every 4 hours (while awake) 360 mL     Respiratory Therapy Supplies (FULL KIT NEBULIZER SET) MISC Use as directed with nebulized medication. 1 each 0    oxyCODONE-acetaminophen (PERCOCET) 7.5-325 MG per tablet Take 1 tablet by mouth 2 times daily as needed for Pain. No current facility-administered medications for this visit. HPI: Patient comes in for [facility] hospital follow-up transitional care visit. He was recently admitted due to acute on chronic hypoxic respiratory failure due to COPD exacerbation.   He was treated with antibiotics and steroids and bronchodilators and seen in consultation and followed by pulmonology and infectious disease. Review of Systems: as per HPI      Physical Exam:    Patient is a 73 y.o. male. Patient appears to be in no distress. Breathing comfortably. Ambulates without assistance. HEENT: normal.  Neck supple, no adenopathy or bruits. Heart RR, no MGR. Lungs clear. Abd: normal  Ext: no edema. Peripheral pulses: normal.  No neurologic deficits noted. Lab Results   Component Value Date    WBC 9.3 02/07/2022    HGB 17.0 (H) 02/07/2022    HCT 50.7 02/07/2022     02/07/2022    CHOL 141 02/02/2022    TRIG 41 02/02/2022    HDL 61 02/02/2022    ALT 11 02/06/2022    AST 18 02/06/2022    TSH 0.858 02/02/2022    PSA 0.68 01/11/2022    INR 1.1 05/30/2019    LABA1C 5.4 02/01/2022      Lab Results   Component Value Date     02/06/2022    K 4.3 02/06/2022    CL 99 02/06/2022    CO2 27 02/06/2022    BUN 17 02/06/2022    CREATININE 0.7 02/06/2022    GLUCOSE 105 (H) 02/06/2022    CALCIUM 9.6 02/06/2022    PROT 5.9 (L) 02/06/2022    LABALBU 3.8 02/06/2022    BILITOT 0.5 02/06/2022    ALKPHOS 59 02/06/2022    AST 18 02/06/2022    ALT 11 02/06/2022    LABGLOM >60 02/06/2022    GFRAA >60 02/06/2022            Assessment:    Jarret Hong was seen today for care management. 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  He will remain on continuous supplemental oxygen. He will finish his steroid taper as ordered. He will follow-up with pulmonology and infectious disease as per their instructions.

## 2022-02-22 ENCOUNTER — CARE COORDINATION (OUTPATIENT)
Dept: CASE MANAGEMENT | Age: 72
End: 2022-02-22

## 2022-02-22 NOTE — CARE COORDINATION
[facility] Transitions Follow Up Call    2022    Patient: Patricia Downs  Patient : 1950   MRN: [id number]  Reason for Admission: Acute on chronic respiratory failure with  hypoxia  Discharge Date: 22 RARS: Readmission Risk Score: 10.7 ( )         Spoke with: [facility] Transitions Subsequent and Final Call    Subsequent and Final Calls  Do you have any ongoing symptoms?: No  Have your medications changed?: No  Do you currently have any active services?: No  Are you currently active with any services?: Outpatient/Community Services  Do you have any needs or concerns that I can assist you with?: No  Identified Barriers: Lack of Motivation  Care Transitions Interventions  No Identified Needs  Other Interventions:         Spoke briefly with patient today 22 for TCM/hospital discharge (low risk) follow up. Patient states he continues doing well since last CTN call and offers no complaints. Patient states his wife is on other line and needs to hang up but verbalizes \" I'm doing fine\". Noted patient completed HFU appt with Dr. Ana Denton (PCP) on 2/15/22. CTN signing off.      Follow Up  Future Appointments   Date Time Provider Tan Miranda   2022  1:00 PM MIGUE Soler - NP [facility]   3/3/2022  8:45 AM Andrzej Bingham MD [facility]   2022  2:00 PM MD WARD [facility]   2022  2:00 PM MIGUE Soler - NP [facility]   6/10/2022 10:45 AM DO ANJANA Stallings [facility]   2022  1:30 PM MD WARD [facility]       MIGUE Braden

## 2022-03-03 ENCOUNTER — HOSPITAL ENCOUNTER (OUTPATIENT)
Age: 72
Discharge: HOME | End: 2022-03-03
Payer: MEDICARE

## 2022-03-03 ENCOUNTER — NURSE ONLY (OUTPATIENT)
Dept: PRIMARY CARE CLINIC | Age: 72
End: 2022-03-03
Payer: MEDICARE

## 2022-03-03 DIAGNOSIS — Z23 ENCOUNTER FOR IMMUNIZATION: Primary | ICD-10-CM

## 2022-03-03 DIAGNOSIS — A31.0 PULMONARY MYCOBACTERIUM AVIUM COMPLEX (MAC) INFECTION (HCC): ICD-10-CM

## 2022-03-03 PROCEDURE — 90732 PPSV23 VACC 2 YRS+ SUBQ/IM: CPT | Performed by: INTERNAL MEDICINE

## 2022-03-03 PROCEDURE — 90715 TDAP VACCINE 7 YRS/> IM: CPT | Performed by: INTERNAL MEDICINE

## 2022-03-03 PROCEDURE — G0009 ADMIN PNEUMOCOCCAL VACCINE: HCPCS | Performed by: INTERNAL MEDICINE

## 2022-03-03 PROCEDURE — 90471 IMMUNIZATION ADMIN: CPT | Performed by: INTERNAL MEDICINE

## 2022-03-03 PROCEDURE — 86317 IMMUNOASSAY INFECTIOUS AGENT: CPT

## 2022-03-03 NOTE — PATIENT INSTRUCTIONS
Patient Education        Pneumococcal Polysaccharide Vaccine: What You Need to Know  Why get vaccinated? Pneumococcal polysaccharide vaccine (PPSV23) can prevent pneumococcal disease. Pneumococcal disease refers to any illness caused by pneumococcal bacteria. These bacteria can cause many types of illnesses, including pneumonia, which is an infection of the lungs. Pneumococcal bacteria are one of the most common causes of pneumonia. Besides pneumonia, pneumococcal bacteria can also cause:  · Ear infections,  · Sinus infections  · Meningitis (infection of the tissue covering the brain and spinal cord)  · Bacteremia (bloodstream infection)  Anyone can get pneumococcal disease, but children under 3years of age, people with certain medical conditions, adults 72 years or older, and cigarette smokers are at the highest risk. Most pneumococcal infections are mild. However, some can result in long-term problems, such as brain damage or hearing loss. Meningitis, bacteremia, and pneumonia caused by pneumococcal disease can be fatal.  PPSV23  PPSV23 protects against 23 types of bacteria that cause pneumococcal disease. PPSV23 is recommended for:  · All adults 72 years or older,  · Anyone 2 years or older with certain medical conditions that can lead to an increased risk for pneumococcal disease. Most people need only one dose of PPSV23. A second dose of PPSV23, and another type of pneumococcal vaccine called PCV13, are recommended for certain high-risk groups. Your health care provider can give you more information. People 65 years or older should get a dose of PPSV23 even if they have already gotten one or more doses of the vaccine before they turned 65. Talk with your health care provider  Tell your vaccine provider if the person getting the vaccine:  · Has had an allergic reaction after a previous dose of PPSV23, or has any severe, life-threatening allergies.   In some cases, your health care provider may decide to postpone PPSV23 vaccination to a future visit. People with minor illnesses, such as a cold, may be vaccinated. People who are moderately or severely ill should usually wait until they recover before getting PPSV23. Your health care provider can give you more information. Risks of a vaccine reaction  · Redness or pain where the shot is given, feeling tired, fever, or muscle aches can happen after PPSV23. People sometimes faint after medical procedures, including vaccination. Tell your provider if you feel dizzy or have vision changes or ringing in the ears. As with any medicine, there is a very remote chance of a vaccine causing a severe allergic reaction, other serious injury, or death. What if there is a serious problem? An allergic reaction could occur after the vaccinated person leaves the clinic. If you see signs of a severe allergic reaction (hives, swelling of the face and throat, difficulty breathing, a fast heartbeat, dizziness, or weakness), call 9-1-1 and get the person to the nearest hospital.  For other signs that concern you, call your health care provider. Adverse reactions should be reported to the Vaccine Adverse Event Reporting System (VAERS). Your health care provider will usually file this report, or you can do it yourself. Visit the VAERS website at www.vaers. hhs.gov at www.vaers. hhs.gov or call 7-214.276.3528. VAERS is only for reporting reactions, and VAERS staff do not give medical advice. How can I learn more? · Ask your health care provider. · Call your local or state health department. · Contact the Centers for Disease Control and Prevention (CDC):  ? Call 0-248.846.4919 (1-800-CDC-INFO) or  ? Visit CDC's website at www.cdc.gov/vaccines  Vaccine Information Statement  PPSV23 Vaccine  10/30/2019  Department of [facility] and [employer] for Disease Control and Prevention  Many Vaccine Information Statements are available in [place] and other languages.  See www.immunize.org/vis. Hojas de información Sobre Vacunas están disponibles en español y en muchos otros idiomas. Visite Tish.si. Care instructions adapted under license by [facility] ([facility]). If you have questions about a medical condition or this instruction, always ask your healthcare professional. [place] [address] any warranty or liability for your use of this information. Patient Education        Tdap (Tetanus, Diphtheria, Pertussis) Vaccine: What You Need to Know  Why get vaccinated? Tdap vaccine can prevent tetanus, diphtheria, and pertussis. Diphtheria and pertussis spread from person to person. Tetanus enters the body through cuts or wounds. · TETANUS (T) causes painful stiffening of the muscles. Tetanus can lead to serious health problems, including being unable to open the mouth, having trouble swallowing and breathing, or death. · DIPHTHERIA (D) can lead to difficulty breathing, heart failure, paralysis, or death. · PERTUSSIS (aP), also known as \"whooping cough,\" can cause uncontrollable, violent coughing that makes it hard to breathe, eat, or drink. Pertussis can be extremely serious especially in babies and young children, causing pneumonia, convulsions, brain damage, or death. In teens and adults, it can cause weight loss, loss of bladder control, passing out, and rib fractures from severe coughing. Tdap vaccine  Tdap is only for children 7 years and older, adolescents, and adults. Adolescents should receive a single dose of Tdap, preferably at age 6 or 15 years. Pregnant people should get a dose of Tdap during every pregnancy, preferably during the early part of the third trimester, to help protect the  from pertussis. Infants are most at risk for severe, life-threatening complications from pertussis. Adults who have never received Tdap should get a dose of Tdap.   Also, adults should receive a booster dose of either Tdap or Td (a different vaccine that protects against tetanus and diphtheria but not pertussis) every 10 years, or after 5 years in the case of a severe or dirty wound or burn. Tdap may be given at the same time as other vaccines. Talk with your health care provider  Tell your vaccination provider if the person getting the vaccine:  · Has had an allergic reaction after a previous dose of any vaccine that protects against tetanus, diphtheria, or pertussis, or has any severe, life-threatening allergies  · Has had a coma, decreased level of consciousness, or prolonged seizures within 7 days after a previous dose of any pertussis vaccine (DTP, DTaP, or Tdap)  · Has seizures or another nervous system problem  · Has ever had Guillain-Barré Syndrome (also called \"GBS\")  · Has had severe pain or swelling after a previous dose of any vaccine that protects against tetanus or diphtheria  In some cases, your health care provider may decide to postpone Tdap vaccination until a future visit. People with minor illnesses, such as a cold, may be vaccinated. People who are moderately or severely ill should usually wait until they recover before getting Tdap vaccine. Your health care provider can give you more information. Risks of a vaccine reaction  · Pain, redness, or swelling where the shot was given, mild fever, headache, feeling tired, and nausea, vomiting, diarrhea, or stomachache sometimes happen after Tdap vaccination. People sometimes faint after medical procedures, including vaccination. Tell your provider if you feel dizzy or have vision changes or ringing in the ears. As with any medicine, there is a very remote chance of a vaccine causing a severe allergic reaction, other serious injury, or death. What if there is a serious problem? An allergic reaction could occur after the vaccinated person leaves the clinic.  If you see signs of a severe allergic reaction (hives, swelling of the face and throat, difficulty breathing, a fast always ask your healthcare professional. [place] [address] any warranty or liability for your use of this information.

## 2022-03-06 LAB
Lab: NORMAL
REPORT: NORMAL
THIS TEST SENT TO: NORMAL

## 2022-03-14 ENCOUNTER — HOSPITAL ENCOUNTER (INPATIENT)
Age: 72
LOS: 4 days | Discharge: HOME | DRG: 190 | End: 2022-03-18
Attending: EMERGENCY MEDICINE | Admitting: INTERNAL MEDICINE
Payer: MEDICARE

## 2022-03-14 ENCOUNTER — APPOINTMENT (OUTPATIENT)
Dept: GENERAL RADIOLOGY | Age: 72
DRG: 190 | End: 2022-03-14
Payer: MEDICARE

## 2022-03-14 DIAGNOSIS — R06.02 SHORTNESS OF BREATH: ICD-10-CM

## 2022-03-14 DIAGNOSIS — J44.1 COPD EXACERBATION (HCC): ICD-10-CM

## 2022-03-14 DIAGNOSIS — J18.9 PNEUMONIA DUE TO INFECTIOUS ORGANISM, UNSPECIFIED LATERALITY, UNSPECIFIED PART OF LUNG: ICD-10-CM

## 2022-03-14 DIAGNOSIS — R09.02 HYPOXIA: Primary | ICD-10-CM

## 2022-03-14 LAB
ADENOVIRUS BY PCR: NOT DETECTED
ALBUMIN SERPL-MCNC: 4 G/DL (ref 3.5–5.2)
ALP BLD-CCNC: 69 U/L (ref 40–129)
ALT SERPL-CCNC: 14 U/L (ref 0–40)
ANION GAP SERPL CALCULATED.3IONS-SCNC: 10 MMOL/L (ref 7–16)
AST SERPL-CCNC: 19 U/L (ref 0–39)
B.E.: -1.7 MMOL/L (ref -3–3)
BASOPHILS ABSOLUTE: 0.03 E9/L (ref 0–0.2)
BASOPHILS RELATIVE PERCENT: 0.4 % (ref 0–2)
BILIRUB SERPL-MCNC: 0.6 MG/DL (ref 0–1.2)
BORDETELLA PARAPERTUSSIS BY PCR: NOT DETECTED
BORDETELLA PERTUSSIS BY PCR: NOT DETECTED
BUN BLDV-MCNC: 9 MG/DL (ref 6–23)
CALCIUM SERPL-MCNC: 9.8 MG/DL (ref 8.6–10.2)
CHLAMYDOPHILIA PNEUMONIAE BY PCR: NOT DETECTED
CHLORIDE BLD-SCNC: 104 MMOL/L (ref 98–107)
CO2: 24 MMOL/L (ref 22–29)
COHB: 1.9 % (ref 0–1.5)
CORONAVIRUS 229E BY PCR: NOT DETECTED
CORONAVIRUS HKU1 BY PCR: NOT DETECTED
CORONAVIRUS NL63 BY PCR: NOT DETECTED
CORONAVIRUS OC43 BY PCR: NOT DETECTED
CREAT SERPL-MCNC: 0.8 MG/DL (ref 0.7–1.2)
CRITICAL: ABNORMAL
DATE ANALYZED: ABNORMAL
DATE OF COLLECTION: ABNORMAL
EKG ATRIAL RATE: 71 BPM
EKG P AXIS: 39 DEGREES
EKG P-R INTERVAL: 182 MS
EKG Q-T INTERVAL: 434 MS
EKG QRS DURATION: 100 MS
EKG QTC CALCULATION (BAZETT): 471 MS
EKG R AXIS: 11 DEGREES
EKG T AXIS: 29 DEGREES
EKG VENTRICULAR RATE: 71 BPM
EOSINOPHILS ABSOLUTE: 0.04 E9/L (ref 0.05–0.5)
EOSINOPHILS RELATIVE PERCENT: 0.5 % (ref 0–6)
FOLATE: 16.4 NG/ML (ref 4.8–24.2)
GFR AFRICAN AMERICAN: >60
GFR NON-AFRICAN AMERICAN: >60 ML/MIN/1.73
GLUCOSE BLD-MCNC: 100 MG/DL (ref 74–99)
HCO3: 20.8 MMOL/L (ref 22–26)
HCT VFR BLD CALC: 48.6 % (ref 37–54)
HEMOGLOBIN: 16.2 G/DL (ref 12.5–16.5)
HHB: 9.8 % (ref 0–5)
HUMAN METAPNEUMOVIRUS BY PCR: NOT DETECTED
HUMAN RHINOVIRUS/ENTEROVIRUS BY PCR: NOT DETECTED
IMMATURE GRANULOCYTES #: 0.03 E9/L
IMMATURE GRANULOCYTES %: 0.4 % (ref 0–5)
INFLUENZA A BY PCR: NOT DETECTED
INFLUENZA B BY PCR: NOT DETECTED
LAB: ABNORMAL
LIPASE: 17 U/L (ref 13–60)
LYMPHOCYTES ABSOLUTE: 1.26 E9/L (ref 1.5–4)
LYMPHOCYTES RELATIVE PERCENT: 16.6 % (ref 20–42)
Lab: ABNORMAL
MCH RBC QN AUTO: 30.5 PG (ref 26–35)
MCHC RBC AUTO-ENTMCNC: 33.3 % (ref 32–34.5)
MCV RBC AUTO: 91.5 FL (ref 80–99.9)
METER GLUCOSE: 104 MG/DL (ref 74–99)
METER GLUCOSE: 319 MG/DL (ref 74–99)
METHB: 0.3 % (ref 0–1.5)
MODE: ABNORMAL
MONOCYTES ABSOLUTE: 0.44 E9/L (ref 0.1–0.95)
MONOCYTES RELATIVE PERCENT: 5.8 % (ref 2–12)
MYCOPLASMA PNEUMONIAE BY PCR: NOT DETECTED
NEUTROPHILS ABSOLUTE: 5.81 E9/L (ref 1.8–7.3)
NEUTROPHILS RELATIVE PERCENT: 76.3 % (ref 43–80)
O2 CONTENT: 19.9 ML/DL
O2 SATURATION: 90 % (ref 92–98.5)
O2HB: 88 % (ref 94–97)
OPERATOR ID: 913
PARAINFLUENZA VIRUS 1 BY PCR: NOT DETECTED
PARAINFLUENZA VIRUS 2 BY PCR: NOT DETECTED
PARAINFLUENZA VIRUS 3 BY PCR: NOT DETECTED
PARAINFLUENZA VIRUS 4 BY PCR: NOT DETECTED
PATIENT TEMP: 37 C
PCO2: 30 MMHG (ref 35–45)
PDW BLD-RTO: 15.2 FL (ref 11.5–15)
PH BLOOD GAS: 7.46 (ref 7.35–7.45)
PLATELET # BLD: 261 E9/L (ref 130–450)
PMV BLD AUTO: 9.1 FL (ref 7–12)
PO2: 55 MMHG (ref 75–100)
POTASSIUM REFLEX MAGNESIUM: 4.7 MMOL/L (ref 3.5–5)
PRO-BNP: 2696 PG/ML (ref 0–125)
PROCALCITONIN: 0.06 NG/ML (ref 0–0.08)
RBC # BLD: 5.31 E12/L (ref 3.8–5.8)
RESPIRATORY SYNCYTIAL VIRUS BY PCR: NOT DETECTED
SARS-COV-2, PCR: NOT DETECTED
SODIUM BLD-SCNC: 138 MMOL/L (ref 132–146)
SOURCE, BLOOD GAS: ABNORMAL
THB: 16.1 G/DL (ref 11.5–16.5)
TIME ANALYZED: 1333
TOTAL PROTEIN: 6.8 G/DL (ref 6.4–8.3)
TROPONIN, HIGH SENSITIVITY: 6 NG/L (ref 0–11)
TROPONIN, HIGH SENSITIVITY: 8 NG/L (ref 0–11)
VITAMIN B-12: 518 PG/ML (ref 211–946)
WBC # BLD: 7.6 E9/L (ref 4.5–11.5)

## 2022-03-14 PROCEDURE — 6360000002 HC RX W HCPCS: Performed by: INTERNAL MEDICINE

## 2022-03-14 PROCEDURE — 93010 ELECTROCARDIOGRAM REPORT: CPT | Performed by: INTERNAL MEDICINE

## 2022-03-14 PROCEDURE — 83690 ASSAY OF LIPASE: CPT

## 2022-03-14 PROCEDURE — 82746 ASSAY OF FOLIC ACID SERUM: CPT

## 2022-03-14 PROCEDURE — 84145 PROCALCITONIN (PCT): CPT

## 2022-03-14 PROCEDURE — 82962 GLUCOSE BLOOD TEST: CPT

## 2022-03-14 PROCEDURE — 82805 BLOOD GASES W/O2 SATURATION: CPT

## 2022-03-14 PROCEDURE — 71045 X-RAY EXAM CHEST 1 VIEW: CPT

## 2022-03-14 PROCEDURE — 80053 COMPREHEN METABOLIC PANEL: CPT

## 2022-03-14 PROCEDURE — 6360000002 HC RX W HCPCS: Performed by: STUDENT IN AN ORGANIZED HEALTH CARE EDUCATION/TRAINING PROGRAM

## 2022-03-14 PROCEDURE — 93005 ELECTROCARDIOGRAM TRACING: CPT | Performed by: STUDENT IN AN ORGANIZED HEALTH CARE EDUCATION/TRAINING PROGRAM

## 2022-03-14 PROCEDURE — 36415 COLL VENOUS BLD VENIPUNCTURE: CPT

## 2022-03-14 PROCEDURE — 96374 THER/PROPH/DIAG INJ IV PUSH: CPT

## 2022-03-14 PROCEDURE — 87449 NOS EACH ORGANISM AG IA: CPT

## 2022-03-14 PROCEDURE — 6370000000 HC RX 637 (ALT 250 FOR IP): Performed by: INTERNAL MEDICINE

## 2022-03-14 PROCEDURE — 85025 COMPLETE CBC W/AUTO DIFF WBC: CPT

## 2022-03-14 PROCEDURE — 2580000003 HC RX 258: Performed by: STUDENT IN AN ORGANIZED HEALTH CARE EDUCATION/TRAINING PROGRAM

## 2022-03-14 PROCEDURE — 84484 ASSAY OF TROPONIN QUANT: CPT

## 2022-03-14 PROCEDURE — 87040 BLOOD CULTURE FOR BACTERIA: CPT

## 2022-03-14 PROCEDURE — 94664 DEMO&/EVAL PT USE INHALER: CPT

## 2022-03-14 PROCEDURE — 82607 VITAMIN B-12: CPT

## 2022-03-14 PROCEDURE — 96375 TX/PRO/DX INJ NEW DRUG ADDON: CPT

## 2022-03-14 PROCEDURE — 2060000000 HC ICU INTERMEDIATE R&B

## 2022-03-14 PROCEDURE — 0202U NFCT DS 22 TRGT SARS-COV-2: CPT

## 2022-03-14 PROCEDURE — 83880 ASSAY OF NATRIURETIC PEPTIDE: CPT

## 2022-03-14 PROCEDURE — 94640 AIRWAY INHALATION TREATMENT: CPT

## 2022-03-14 PROCEDURE — 99283 EMERGENCY DEPT VISIT LOW MDM: CPT

## 2022-03-14 PROCEDURE — 2500000003 HC RX 250 WO HCPCS: Performed by: STUDENT IN AN ORGANIZED HEALTH CARE EDUCATION/TRAINING PROGRAM

## 2022-03-14 PROCEDURE — 87088 URINE BACTERIA CULTURE: CPT

## 2022-03-14 PROCEDURE — 6370000000 HC RX 637 (ALT 250 FOR IP): Performed by: STUDENT IN AN ORGANIZED HEALTH CARE EDUCATION/TRAINING PROGRAM

## 2022-03-14 RX ORDER — DEXTROSE MONOHYDRATE 25 G/50ML
12.5 INJECTION, SOLUTION INTRAVENOUS PRN
Status: DISCONTINUED | OUTPATIENT
Start: 2022-03-14 | End: 2022-03-14

## 2022-03-14 RX ORDER — FERROUS SULFATE 325(65) MG
325 TABLET ORAL 2 TIMES DAILY WITH MEALS
Status: DISCONTINUED | OUTPATIENT
Start: 2022-03-14 | End: 2022-03-18 | Stop reason: HOSPADM

## 2022-03-14 RX ORDER — POTASSIUM CHLORIDE 7.45 MG/ML
10 INJECTION INTRAVENOUS PRN
Status: DISCONTINUED | OUTPATIENT
Start: 2022-03-14 | End: 2022-03-18 | Stop reason: HOSPADM

## 2022-03-14 RX ORDER — IPRATROPIUM BROMIDE AND ALBUTEROL SULFATE 2.5; .5 MG/3ML; MG/3ML
3 SOLUTION RESPIRATORY (INHALATION)
Status: DISCONTINUED | OUTPATIENT
Start: 2022-03-14 | End: 2022-03-15

## 2022-03-14 RX ORDER — IPRATROPIUM BROMIDE AND ALBUTEROL SULFATE 2.5; .5 MG/3ML; MG/3ML
3 SOLUTION RESPIRATORY (INHALATION) ONCE
Status: COMPLETED | OUTPATIENT
Start: 2022-03-14 | End: 2022-03-14

## 2022-03-14 RX ORDER — BUDESONIDE 0.25 MG/2ML
250 INHALANT ORAL 2 TIMES DAILY
Status: DISCONTINUED | OUTPATIENT
Start: 2022-03-14 | End: 2022-03-15

## 2022-03-14 RX ORDER — AZITHROMYCIN 250 MG/1
500 TABLET, FILM COATED ORAL
Status: DISCONTINUED | OUTPATIENT
Start: 2022-03-14 | End: 2022-03-18 | Stop reason: HOSPADM

## 2022-03-14 RX ORDER — DEXTROSE MONOHYDRATE 50 MG/ML
100 INJECTION, SOLUTION INTRAVENOUS PRN
Status: DISCONTINUED | OUTPATIENT
Start: 2022-03-14 | End: 2022-03-18 | Stop reason: HOSPADM

## 2022-03-14 RX ORDER — ARFORMOTEROL TARTRATE 15 UG/2ML
15 SOLUTION RESPIRATORY (INHALATION) 2 TIMES DAILY
Status: DISCONTINUED | OUTPATIENT
Start: 2022-03-14 | End: 2022-03-18 | Stop reason: HOSPADM

## 2022-03-14 RX ORDER — METHYLPREDNISOLONE SODIUM SUCCINATE 125 MG/2ML
125 INJECTION, POWDER, LYOPHILIZED, FOR SOLUTION INTRAMUSCULAR; INTRAVENOUS ONCE
Status: COMPLETED | OUTPATIENT
Start: 2022-03-14 | End: 2022-03-14

## 2022-03-14 RX ORDER — OXYCODONE AND ACETAMINOPHEN 7.5; 325 MG/1; MG/1
1 TABLET ORAL 2 TIMES DAILY PRN
Status: DISCONTINUED | OUTPATIENT
Start: 2022-03-14 | End: 2022-03-14

## 2022-03-14 RX ORDER — METHYLPREDNISOLONE SODIUM SUCCINATE 40 MG/ML
40 INJECTION, POWDER, LYOPHILIZED, FOR SOLUTION INTRAMUSCULAR; INTRAVENOUS EVERY 8 HOURS
Status: DISCONTINUED | OUTPATIENT
Start: 2022-03-14 | End: 2022-03-17

## 2022-03-14 RX ORDER — ACETAMINOPHEN 325 MG/1
650 TABLET ORAL EVERY 4 HOURS PRN
Status: DISCONTINUED | OUTPATIENT
Start: 2022-03-14 | End: 2022-03-18 | Stop reason: HOSPADM

## 2022-03-14 RX ORDER — PANTOPRAZOLE SODIUM 40 MG/1
40 TABLET, DELAYED RELEASE ORAL
Status: DISCONTINUED | OUTPATIENT
Start: 2022-03-15 | End: 2022-03-18 | Stop reason: HOSPADM

## 2022-03-14 RX ORDER — OXYCODONE HYDROCHLORIDE AND ACETAMINOPHEN 5; 325 MG/1; MG/1
1 TABLET ORAL 2 TIMES DAILY PRN
Status: DISCONTINUED | OUTPATIENT
Start: 2022-03-14 | End: 2022-03-18 | Stop reason: HOSPADM

## 2022-03-14 RX ORDER — OXYCODONE HYDROCHLORIDE 5 MG/1
2.5 TABLET ORAL 2 TIMES DAILY PRN
Status: DISCONTINUED | OUTPATIENT
Start: 2022-03-14 | End: 2022-03-18 | Stop reason: HOSPADM

## 2022-03-14 RX ORDER — GUAIFENESIN/DEXTROMETHORPHAN 100-10MG/5
5 SYRUP ORAL 4 TIMES DAILY PRN
COMMUNITY
End: 2022-04-18

## 2022-03-14 RX ORDER — LACTOBACILLUS RHAMNOSUS GG 10B CELL
1 CAPSULE ORAL DAILY
Status: DISCONTINUED | OUTPATIENT
Start: 2022-03-15 | End: 2022-03-18 | Stop reason: HOSPADM

## 2022-03-14 RX ORDER — CEFTRIAXONE 2 G/1
INJECTION, POWDER, FOR SOLUTION INTRAMUSCULAR; INTRAVENOUS
Status: DISPENSED
Start: 2022-03-14 | End: 2022-03-15

## 2022-03-14 RX ORDER — NICOTINE POLACRILEX 4 MG
15 LOZENGE BUCCAL PRN
Status: DISCONTINUED | OUTPATIENT
Start: 2022-03-14 | End: 2022-03-18 | Stop reason: HOSPADM

## 2022-03-14 RX ORDER — BENZONATATE 100 MG/1
100 CAPSULE ORAL 3 TIMES DAILY PRN
COMMUNITY
End: 2022-04-18

## 2022-03-14 RX ORDER — POTASSIUM CHLORIDE 20 MEQ/1
40 TABLET, EXTENDED RELEASE ORAL PRN
Status: DISCONTINUED | OUTPATIENT
Start: 2022-03-14 | End: 2022-03-18 | Stop reason: HOSPADM

## 2022-03-14 RX ADMIN — IPRATROPIUM BROMIDE AND ALBUTEROL SULFATE 3 AMPULE: .5; 3 SOLUTION RESPIRATORY (INHALATION) at 13:24

## 2022-03-14 RX ADMIN — FERROUS SULFATE TAB 325 MG (65 MG ELEMENTAL FE) 325 MG: 325 (65 FE) TAB at 18:26

## 2022-03-14 RX ADMIN — INSULIN LISPRO 2 UNITS: 100 INJECTION, SOLUTION INTRAVENOUS; SUBCUTANEOUS at 21:15

## 2022-03-14 RX ADMIN — METHYLPREDNISOLONE SODIUM SUCCINATE 40 MG: 40 INJECTION, POWDER, LYOPHILIZED, FOR SOLUTION INTRAMUSCULAR; INTRAVENOUS at 23:18

## 2022-03-14 RX ADMIN — METHYLPREDNISOLONE SODIUM SUCCINATE 125 MG: 125 INJECTION, POWDER, FOR SOLUTION INTRAMUSCULAR; INTRAVENOUS at 14:43

## 2022-03-14 RX ADMIN — BUDESONIDE 250 MCG: 0.25 SUSPENSION RESPIRATORY (INHALATION) at 19:53

## 2022-03-14 RX ADMIN — IPRATROPIUM BROMIDE AND ALBUTEROL SULFATE 3 ML: 2.5; .5 SOLUTION RESPIRATORY (INHALATION) at 19:53

## 2022-03-14 RX ADMIN — ENOXAPARIN SODIUM 40 MG: 100 INJECTION SUBCUTANEOUS at 18:26

## 2022-03-14 RX ADMIN — WATER 2000 MG: 1 INJECTION INTRAMUSCULAR; INTRAVENOUS; SUBCUTANEOUS at 15:29

## 2022-03-14 RX ADMIN — ARFORMOTEROL TARTRATE 15 MCG: 15 SOLUTION RESPIRATORY (INHALATION) at 19:53

## 2022-03-14 RX ADMIN — DOXYCYCLINE 100 MG: 100 INJECTION, POWDER, LYOPHILIZED, FOR SOLUTION INTRAVENOUS at 15:38

## 2022-03-14 ASSESSMENT — PAIN SCALES - GENERAL: PAINLEVEL_OUTOF10: 0

## 2022-03-14 NOTE — ED PROVIDER NOTES
[address] [facility]      Pt Name: Geena Maddox  MRN: [id number]  [place] 1950  Date of evaluation: 3/14/2022      CHIEF COMPLAINT       Chief Complaint   Patient presents with    Shortness of Breath     increasing on home O2 for years. HPI  Geena Maddox is a 73 y.o. male with history of COPD on home oxygen, who follows with ID for the Mycobacterium who presents to the emergency department worsening shortness of breath. Patient states over the last couple of days he is gotten more short of breath. He states that when he walks around he gets profoundly dyspneic. He is also coughing. Patient had a fever about a week ago. Wife states that he gets very short of breath and seems to have increased work of breathing with just walking several steps. He denies any chest pain or leg swelling. Describes symptoms as moderate, worse with exertion, better at rest.      Review of Systems   Constitutional: Positive for fatigue and fever. Negative for activity change, chills and diaphoresis. HENT: Negative for rhinorrhea, sore throat and trouble swallowing. Eyes: Negative for photophobia, pain and redness. Respiratory: Positive for cough and shortness of breath. Negative for apnea, chest tightness and wheezing. Cardiovascular: Negative for chest pain, palpitations and leg swelling. Gastrointestinal: Negative for abdominal pain, constipation, diarrhea, nausea and vomiting. Endocrine: Negative for polyuria. Genitourinary: Negative for difficulty urinating and dysuria. Musculoskeletal: Negative for back pain, neck pain and neck stiffness. Skin: Negative for pallor and rash. Neurological: Negative for dizziness, syncope, weakness, light-headedness and numbness. Psychiatric/Behavioral: Negative for confusion. The patient is not nervous/anxious. Physical Exam  Vitals and nursing note reviewed.    Constitutional:       General: He is not in acute distress. Appearance: He is well-developed. Comments: Awake and alert. Sitting in the gurney in no obvious distress. HENT:      Head: Normocephalic and atraumatic. Mouth/Throat:      Mouth: Mucous membranes are moist.      Pharynx: No oropharyngeal exudate. Eyes:      General: No scleral icterus. Pupils: Pupils are equal, round, and reactive to light. Cardiovascular:      Rate and Rhythm: Normal rate and regular rhythm. Heart sounds: Normal heart sounds. No murmur heard. Comments: 2+ radial and dorsal pedis pulses bilaterally  Pulmonary:      Effort: No respiratory distress. Breath sounds: Wheezing present. Comments: Mild expiratory wheezing. Speaking in full sentences on 3 L nasal cannula at rest.  Hypoxic with ambulation across the room while on his baseline oxygen. Abdominal:      Palpations: Abdomen is soft. Tenderness: There is no abdominal tenderness. There is no guarding or rebound. Musculoskeletal:         General: No tenderness or deformity. Normal range of motion. Cervical back: Normal range of motion and neck supple. Right lower leg: No edema. Left lower leg: No edema. Skin:     General: Skin is warm and dry. Capillary Refill: Capillary refill takes less than 2 seconds. Findings: No rash. Neurological:      General: No focal deficit present. Mental Status: He is alert and oriented to person, place, and time. Cranial Nerves: No cranial nerve deficit. Sensory: No sensory deficit. Motor: No weakness or abnormal muscle tone. Psychiatric:         Mood and Affect: Mood normal.         Behavior: Behavior normal.          Procedures     MDM     This is a 24-year-old male with a history of COPD on 3 L nasal cannula at baseline who presents with shortness of breath cough and fever. In the emergency department the patient is awake and alert, hemodynamic stable, afebrile.   At rest he is not hypoxic on baseline oxygen however with even just several steps ambulating he becomes hypoxic down into the mid 80s and has increased work of breathing. More dyspneic taking about a minute in order to calm back down and return to normal work of breathing. Chest x-ray shows worsening pneumonia compared to previous. Patient started on Rocephin and doxycycline IV in the emergency department. Solu-Medrol as well as DuoNeb's were administered for likely mild COPD exacerbation. Discussed with Dr. Arin Du, except patient for further evaluation. EKG showed no ischemic changes troponin reassuring less likely acute cardiac etiology such as ACS.                --------------------------------------------- PAST HISTORY ---------------------------------------------  Past Medical History:  has a past medical history of Acute and chronic respiratory failure with hypoxia (HCC), Acute heart failure with preserved ejection fraction ([address] Acute respiratory disease due to severe acute respiratory syndrome coronavirus 2 (SARS-CoV-2), Acute respiratory failure with hypoxia (HCC), Bullous emphysema (HCC), Chronic back pain, Colon cancer screening, COPD (chronic obstructive pulmonary disease) ([address] Coronavirus infection, Cough with hemoptysis, 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 Past Surgical History:  has a past surgical history that includes Abscess Drainage (2006);  Colonoscopy (11/18/2013); lumbar fusion (03/2019); bronchoscopy (N/A, 6/12/2019); bronchoscopy (N/A, 10/7/2019); Insert Picc Cath, 5/> Yrs (4/10/2021); and bronchoscopy (N/A, 4/12/2021). Social History:  reports that he quit smoking about 7 years ago. His smoking use included cigarettes. He started smoking about 53 years ago. He has a 92.00 pack-year smoking history. He has never used smokeless tobacco. He reports that he does not drink alcohol and does not use drugs. Family History: family history includes Other in his father and mother. The patients home medications have been reviewed. Allergies: Patient has no known allergies.     -------------------------------------------------- RESULTS -------------------------------------------------    LABS:  Results for orders placed or performed during the hospital encounter of 03/14/22   Respiratory Panel, Molecular, with COVID-19 (Restricted: peds pts or suitable admitted adults)    Specimen: Nasopharyngeal   Result Value Ref Range    Adenovirus by PCR Not Detected Not Detected    Bordetella parapertussis by PCR Not Detected Not Detected    Bordetella pertussis by PCR Not Detected Not Detected    Chlamydophilia pneumoniae by PCR Not Detected Not Detected    Coronavirus 229E by PCR Not Detected Not Detected    Coronavirus HKU1 by PCR Not Detected Not Detected    Coronavirus NL63 by PCR Not Detected Not Detected    Coronavirus OC43 by PCR Not Detected Not Detected    SARS-CoV-2, PCR Not Detected Not Detected    Human Metapneumovirus by PCR Not Detected Not Detected    Human Rhinovirus/Enterovirus by PCR Not Detected Not Detected    Influenza A by PCR Not Detected Not Detected    Influenza B by PCR Not Detected Not Detected    Mycoplasma pneumoniae by PCR Not Detected Not Detected    Parainfluenza Virus 1 by PCR Not Detected Not Detected    Parainfluenza Virus 2 by PCR Not Detected Not Detected    Parainfluenza Virus 3 by PCR Not Detected Not Detected    Parainfluenza Virus 4 by PCR Not Detected Not Detected    Respiratory Syncytial Virus by PCR Not Detected Not Detected   Strep Pneumoniae Antigen    Specimen: Urine, clean catch   Result Value Ref Range    STREP PNEUMONIAE ANTIGEN, URINE       Presumptive negative- suggests no current or recent  pneumococcal infection. Infection due to Strep pneumoniae cannot be  ruled out since the antigen present in the sample  may be below the detection limit of the test.  Normal Range:Presumptive Negative     Legionella antigen, urine    Specimen: Urine, clean catch   Result Value Ref Range    L. pneumophila Serogp 1 Ur Ag       Presumptive Negative -suggesting no recent or current infections  with Legionella pneumophila serogroup 1. Infection to Legionella cannot be ruled out since other serogroups  and species may cause infection, antigen may not be present in  early infection, or level of antigen may be below the  detection limit.   Normal Range: Presumptive Negative     CBC with Auto Differential   Result Value Ref Range    WBC 7.6 4.5 - 11.5 E9/L    RBC 5.31 3.80 - 5.80 E12/L    Hemoglobin 16.2 12.5 - 16.5 g/dL    Hematocrit 48.6 37.0 - 54.0 %    MCV 91.5 80.0 - 99.9 fL    MCH 30.5 26.0 - 35.0 pg    MCHC 33.3 32.0 - 34.5 %    RDW 15.2 (H) 11.5 - 15.0 fL    Platelets 209 774 - 607 E9/L    MPV 9.1 7.0 - 12.0 fL    Neutrophils % 76.3 43.0 - 80.0 %    Immature Granulocytes % 0.4 0.0 - 5.0 %    Lymphocytes % 16.6 (L) 20.0 - 42.0 %    Monocytes % 5.8 2.0 - 12.0 %    Eosinophils % 0.5 0.0 - 6.0 %    Basophils % 0.4 0.0 - 2.0 %    Neutrophils Absolute 5.81 1.80 - 7.30 E9/L    Immature Granulocytes # 0.03 E9/L    Lymphocytes Absolute 1.26 (L) 1.50 - 4.00 E9/L    Monocytes Absolute 0.44 0.10 - 0.95 E9/L    Eosinophils Absolute 0.04 (L) 0.05 - 0.50 E9/L    Basophils Absolute 0.03 0.00 - 0.20 E9/L   Comprehensive Metabolic Panel w/ Reflex to MG   Result Value Ref Range    Sodium 138 132 - 146 mmol/L    Potassium reflex Magnesium 4.7 3.5 - 5.0 mmol/L    Chloride 104 98 - 107 mmol/L    CO2 24 22 - 29 mmol/L    Anion Gap 10 7 - 16 mmol/L Glucose 100 (H) 74 - 99 mg/dL    BUN 9 6 - 23 mg/dL    CREATININE 0.8 0.7 - 1.2 mg/dL    GFR Non-African American >60 >=60 mL/min/1.73    GFR African American >60     Calcium 9.8 8.6 - 10.2 mg/dL    Total Protein 6.8 6.4 - 8.3 g/dL    Albumin 4.0 3.5 - 5.2 g/dL    Total Bilirubin 0.6 0.0 - 1.2 mg/dL    Alkaline Phosphatase 69 40 - 129 U/L    ALT 14 0 - 40 U/L    AST 19 0 - 39 U/L   Lipase   Result Value Ref Range    Lipase 17 13 - 60 U/L   Troponin   Result Value Ref Range    Troponin, High Sensitivity 8 0 - 11 ng/L   Brain Natriuretic Peptide   Result Value Ref Range    Pro-BNP 2,696 (H) 0 - 125 pg/mL   Blood Gas, Arterial   Result Value Ref Range    Date Analyzed [id number]     Time Analyzed 1333     Source: Blood Arterial     pH, Blood Gas 7.458 (H) 7.350 - 7.450    PCO2 30.0 (L) 35.0 - 45.0 mmHg    PO2 55.0 (L) 75.0 - 100.0 mmHg    HCO3 20.8 (L) 22.0 - 26.0 mmol/L    B.E. -1.7 -3.0 - 3.0 mmol/L    O2 Sat 90.0 (L) 92.0 - 98.5 %    O2Hb 88.0 (L) 94.0 - 97.0 %    COHb 1.9 (H) 0.0 - 1.5 %    MetHb 0.3 0.0 - 1.5 %    O2 Content 19.9 mL/dL    HHb 9.8 (H) 0.0 - 5.0 %    tHb (est) 16.1 11.5 - 16.5 g/dL    Mode NC- 5 L     Date Of Collection      Time Collected      Pt Temp 37.0 C     ID [id number]     Lab [id number]     Critical(s) Notified .  No Critical Values    Vitamin B12 & Folate   Result Value Ref Range    Vitamin B-12 518 211 - 946 pg/mL    Folate 16.4 4.8 - 24.2 ng/mL   Troponin   Result Value Ref Range    Troponin, High Sensitivity 6 0 - 11 ng/L   Troponin   Result Value Ref Range    Troponin, High Sensitivity 7 0 - 11 ng/L   Basic Metabolic Panel   Result Value Ref Range    Sodium 134 132 - 146 mmol/L    Potassium 3.9 3.5 - 5.0 mmol/L    Chloride 100 98 - 107 mmol/L    CO2 22 22 - 29 mmol/L    Anion Gap 12 7 - 16 mmol/L    Glucose 148 (H) 74 - 99 mg/dL    BUN 9 6 - 23 mg/dL    CREATININE 0.7 0.7 - 1.2 mg/dL    GFR Non-African American >60 >=60 mL/min/1.73    GFR African American >60     Calcium 9.4 8.6 - 10.2 mg/dL Brain Natriuretic Peptide   Result Value Ref Range    Pro-BNP 1,632 (H) 0 - 125 pg/mL   Calcium, Ionized   Result Value Ref Range    Calcium, Ion 1.36 (H) 1.15 - 1.33 mmol/L   CBC with Auto Differential   Result Value Ref Range    WBC 5.8 4.5 - 11.5 E9/L    RBC 4.96 3.80 - 5.80 E12/L    Hemoglobin 14.9 12.5 - 16.5 g/dL    Hematocrit 44.4 37.0 - 54.0 %    MCV 89.5 80.0 - 99.9 fL    MCH 30.0 26.0 - 35.0 pg    MCHC 33.6 32.0 - 34.5 %    RDW 15.0 11.5 - 15.0 fL    Platelets 264 875 - 201 E9/L    MPV 9.3 7.0 - 12.0 fL    Neutrophils % 94.0 (H) 43.0 - 80.0 %    Immature Granulocytes % 0.2 0.0 - 5.0 %    Lymphocytes % 5.1 (L) 20.0 - 42.0 %    Monocytes % 0.5 (L) 2.0 - 12.0 %    Eosinophils % 0.0 0.0 - 6.0 %    Basophils % 0.2 0.0 - 2.0 %    Neutrophils Absolute 5.49 1.80 - 7.30 E9/L    Immature Granulocytes # 0.01 E9/L    Lymphocytes Absolute 0.30 (L) 1.50 - 4.00 E9/L    Monocytes Absolute 0.03 (L) 0.10 - 0.95 E9/L    Eosinophils Absolute 0.00 (L) 0.05 - 0.50 E9/L    Basophils Absolute 0.01 0.00 - 0.20 E9/L    Polychromasia 1+    C-Reactive Protein   Result Value Ref Range    CRP 0.6 (H) 0.0 - 0.4 mg/dL   Magnesium   Result Value Ref Range    Magnesium 2.0 1.6 - 2.6 mg/dL   Lipid Panel   Result Value Ref Range    Cholesterol, Total 145 0 - 199 mg/dL    Triglycerides 47 0 - 149 mg/dL    HDL 62 >40 mg/dL    LDL Calculated 74 0 - 99 mg/dL    VLDL Cholesterol Calculated 9 mg/dL   Lactate, Sepsis   Result Value Ref Range    Lactic Acid, Sepsis 1.1 0.5 - 1.9 mmol/L   Hepatic Function Panel   Result Value Ref Range    Total Protein 6.5 6.4 - 8.3 g/dL    Albumin 3.9 3.5 - 5.2 g/dL    Alkaline Phosphatase 67 40 - 129 U/L    ALT 15 0 - 40 U/L    AST 19 0 - 39 U/L    Total Bilirubin 0.4 0.0 - 1.2 mg/dL    Bilirubin, Direct <0.2 0.0 - 0.3 mg/dL    Bilirubin, Indirect see below 0.0 - 1.0 mg/dL   Hemoglobin A1C   Result Value Ref Range    Hemoglobin A1C 5.7 (H) 4.0 - 5.6 %   Phosphorus   Result Value Ref Range    Phosphorus 2.6 2.5 - 4.5 mg/dL   Uric Acid   Result Value Ref Range    Uric Acid, Serum 4.6 3.4 - 7.0 mg/dL   TSH   Result Value Ref Range    TSH 0.455 0.270 - 4.200 uIU/mL   Sedimentation Rate   Result Value Ref Range    Sed Rate 7 0 - 15 mm/Hr   Procalcitonin   Result Value Ref Range    Procalcitonin 0.06 0.00 - 0.08 ng/mL   POCT Glucose   Result Value Ref Range    Meter Glucose 104 (H) 74 - 99 mg/dL   POCT Glucose   Result Value Ref Range    Meter Glucose 319 (H) 74 - 99 mg/dL   POCT Glucose   Result Value Ref Range    Meter Glucose 117 (H) 74 - 99 mg/dL   EKG 12 Lead   Result Value Ref Range    Ventricular Rate 71 BPM    Atrial Rate 71 BPM    P-R Interval 182 ms    QRS Duration 100 ms    Q-T Interval 434 ms    QTc Calculation (Bazett) 471 ms    P Axis 39 degrees    R Axis 11 degrees    T Axis 29 degrees       RADIOLOGY:  XR CHEST PORTABLE   Final Result   Worsening pneumonia at the right base and left mid lung. Other stable   findings as enumerated above. EKG:  This EKG is signed and interpreted by me. Rate: 71bpm  Rhythm: sinus  Interpretation: normal axis. Non specific st changes. No st segment elevation nor depression. Mild artifact. Comparison: stable as compared to patient's most recent EKG      ------------------------- NURSING NOTES AND VITALS REVIEWED ---------------------------  Date / Time Roomed:  3/14/2022 12:53 PM  ED Bed Assignment:  [id number]    The nursing notes within the ED encounter and vital signs as below have been reviewed.      Patient Vitals for the past 24 hrs:   BP Temp Temp src Pulse Resp SpO2 Height Weight   03/15/22 0911 -- -- -- -- -- 91 % -- --   03/15/22 0845 132/78 97.4 °F (36.3 °C) Axillary 91 20 92 % -- --   03/15/22 0034 -- -- -- -- -- 94 % -- --   03/15/22 0032 -- -- -- -- -- 95 % -- --   03/15/22 0030 112/73 97.6 °F (36.4 °C) Oral 76 18 98 % -- --   03/14/22 2210 -- -- -- -- -- 92 % -- --   03/14/22 2205 -- -- -- -- -- (!) 85 % -- --   03/14/22 2200 -- -- -- -- -- (!) 80 % -- --   03/14/22 2109 130/80 97.7 °F (36.5 °C) Oral 98 18 95 % -- --   03/14/22 1730 130/79 98.1 °F (36.7 °C) Oral 79 18 90 % 6' 1\" (1.854 m) --   03/14/22 1616 135/83 97.5 °F (36.4 °C) -- 81 18 92 % -- --   03/14/22 1441 133/78 -- -- 85 18 92 % -- --   03/14/22 1326 -- -- -- -- -- 90 % -- --   03/14/22 1325 -- -- -- -- -- 91 % -- --   03/14/22 1324 -- -- -- -- -- 90 % -- --   03/14/22 1257 -- -- -- -- 16 -- -- 193 lb (87.5 kg)   03/14/22 1254 (!) 152/82 97.6 °F (36.4 °C) Temporal 80 -- (!) 88 % -- --       Oxygen Saturation Interpretation: Abnormal    ------------------------------------------ PROGRESS NOTES ------------------------------------------    Counseling:  I have spoken with the patient and discussed todays results, in addition to providing specific details for the plan of care and counseling regarding the diagnosis and prognosis. Their questions are answered at this time and they are agreeable with the plan of admission.    --------------------------------- ADDITIONAL PROVIDER NOTES ---------------------------------  Consultations:   Spoke with Dr. Izaiah Lundberg. Discussed case. They will admit the patient. This patient's ED course included: a personal history and physicial examination, re-evaluation prior to disposition, multiple bedside re-evaluations, IV medications, cardiac monitoring and continuous pulse oximetry    This patient has remained hemodynamically stable during their ED course. Diagnosis:  1. Hypoxia    2. Shortness of breath    3. COPD exacerbation ([address]    4. Pneumonia due to infectious organism, unspecified laterality, unspecified part of lung        Disposition:  Patient's disposition: Admit to telemetry  Patient's condition is stable.          eLxis Sabillon DO  Resident  03/15/22 1116

## 2022-03-14 NOTE — PROGRESS NOTES
Database initiated pharmacy and medications verified with the patient. He is A&O independent from home with wife. He has needed 4 liters of oxygen over the last month or so. He does not use a cane or walker.

## 2022-03-14 NOTE — ED NOTES
Spoke to [facility] states SBAR was received     Joan Shen, RN  03/14/22 8115

## 2022-03-15 ENCOUNTER — APPOINTMENT (OUTPATIENT)
Dept: CT IMAGING | Age: 72
DRG: 190 | End: 2022-03-15
Payer: MEDICARE

## 2022-03-15 LAB
ALBUMIN SERPL-MCNC: 3.9 G/DL (ref 3.5–5.2)
ALP BLD-CCNC: 67 U/L (ref 40–129)
ALT SERPL-CCNC: 15 U/L (ref 0–40)
ANION GAP SERPL CALCULATED.3IONS-SCNC: 12 MMOL/L (ref 7–16)
AST SERPL-CCNC: 19 U/L (ref 0–39)
BASOPHILS ABSOLUTE: 0.01 E9/L (ref 0–0.2)
BASOPHILS RELATIVE PERCENT: 0.2 % (ref 0–2)
BILIRUB SERPL-MCNC: 0.4 MG/DL (ref 0–1.2)
BILIRUBIN DIRECT: <0.2 MG/DL (ref 0–0.3)
BILIRUBIN, INDIRECT: NORMAL MG/DL (ref 0–1)
BUN BLDV-MCNC: 9 MG/DL (ref 6–23)
C-REACTIVE PROTEIN: 0.6 MG/DL (ref 0–0.4)
CALCIUM IONIZED: 1.36 MMOL/L (ref 1.15–1.33)
CALCIUM SERPL-MCNC: 9.4 MG/DL (ref 8.6–10.2)
CHLORIDE BLD-SCNC: 100 MMOL/L (ref 98–107)
CHOLESTEROL, TOTAL: 145 MG/DL (ref 0–199)
CO2: 22 MMOL/L (ref 22–29)
CREAT SERPL-MCNC: 0.7 MG/DL (ref 0.7–1.2)
EOSINOPHILS ABSOLUTE: 0 E9/L (ref 0.05–0.5)
EOSINOPHILS RELATIVE PERCENT: 0 % (ref 0–6)
GFR AFRICAN AMERICAN: >60
GFR NON-AFRICAN AMERICAN: >60 ML/MIN/1.73
GLUCOSE BLD-MCNC: 148 MG/DL (ref 74–99)
HBA1C MFR BLD: 5.7 % (ref 4–5.6)
HCT VFR BLD CALC: 44.4 % (ref 37–54)
HDLC SERPL-MCNC: 62 MG/DL
HEMOGLOBIN: 14.9 G/DL (ref 12.5–16.5)
IMMATURE GRANULOCYTES #: 0.01 E9/L
IMMATURE GRANULOCYTES %: 0.2 % (ref 0–5)
L. PNEUMOPHILA SEROGP 1 UR AG: NORMAL
LACTIC ACID, SEPSIS: 1.1 MMOL/L (ref 0.5–1.9)
LDL CHOLESTEROL CALCULATED: 74 MG/DL (ref 0–99)
LV EF: 62 %
LVEF MODALITY: NORMAL
LYMPHOCYTES ABSOLUTE: 0.3 E9/L (ref 1.5–4)
LYMPHOCYTES RELATIVE PERCENT: 5.1 % (ref 20–42)
MAGNESIUM: 2 MG/DL (ref 1.6–2.6)
MCH RBC QN AUTO: 30 PG (ref 26–35)
MCHC RBC AUTO-ENTMCNC: 33.6 % (ref 32–34.5)
MCV RBC AUTO: 89.5 FL (ref 80–99.9)
METER GLUCOSE: 117 MG/DL (ref 74–99)
METER GLUCOSE: 124 MG/DL (ref 74–99)
METER GLUCOSE: 138 MG/DL (ref 74–99)
METER GLUCOSE: 159 MG/DL (ref 74–99)
MONOCYTES ABSOLUTE: 0.03 E9/L (ref 0.1–0.95)
MONOCYTES RELATIVE PERCENT: 0.5 % (ref 2–12)
NEUTROPHILS ABSOLUTE: 5.49 E9/L (ref 1.8–7.3)
NEUTROPHILS RELATIVE PERCENT: 94 % (ref 43–80)
PDW BLD-RTO: 15 FL (ref 11.5–15)
PHOSPHORUS: 2.6 MG/DL (ref 2.5–4.5)
PLATELET # BLD: 256 E9/L (ref 130–450)
PMV BLD AUTO: 9.3 FL (ref 7–12)
POLYCHROMASIA: ABNORMAL
POTASSIUM SERPL-SCNC: 3.9 MMOL/L (ref 3.5–5)
PRO-BNP: 1632 PG/ML (ref 0–125)
PROCALCITONIN: 0.02 NG/ML (ref 0–0.08)
RBC # BLD: 4.96 E12/L (ref 3.8–5.8)
SEDIMENTATION RATE, ERYTHROCYTE: 7 MM/HR (ref 0–15)
SODIUM BLD-SCNC: 134 MMOL/L (ref 132–146)
STREP PNEUMONIAE ANTIGEN, URINE: NORMAL
TOTAL PROTEIN: 6.5 G/DL (ref 6.4–8.3)
TRIGL SERPL-MCNC: 47 MG/DL (ref 0–149)
TROPONIN, HIGH SENSITIVITY: 7 NG/L (ref 0–11)
TSH SERPL DL<=0.05 MIU/L-ACNC: 0.46 UIU/ML (ref 0.27–4.2)
URIC ACID, SERUM: 4.6 MG/DL (ref 3.4–7)
VLDLC SERPL CALC-MCNC: 9 MG/DL
WBC # BLD: 5.8 E9/L (ref 4.5–11.5)

## 2022-03-15 PROCEDURE — 82330 ASSAY OF CALCIUM: CPT

## 2022-03-15 PROCEDURE — 82962 GLUCOSE BLOOD TEST: CPT

## 2022-03-15 PROCEDURE — 83735 ASSAY OF MAGNESIUM: CPT

## 2022-03-15 PROCEDURE — 83605 ASSAY OF LACTIC ACID: CPT

## 2022-03-15 PROCEDURE — 2700000000 HC OXYGEN THERAPY PER DAY

## 2022-03-15 PROCEDURE — 6370000000 HC RX 637 (ALT 250 FOR IP): Performed by: INTERNAL MEDICINE

## 2022-03-15 PROCEDURE — 80061 LIPID PANEL: CPT

## 2022-03-15 PROCEDURE — 84100 ASSAY OF PHOSPHORUS: CPT

## 2022-03-15 PROCEDURE — 84145 PROCALCITONIN (PCT): CPT

## 2022-03-15 PROCEDURE — 86140 C-REACTIVE PROTEIN: CPT

## 2022-03-15 PROCEDURE — 84550 ASSAY OF BLOOD/URIC ACID: CPT

## 2022-03-15 PROCEDURE — 2500000003 HC RX 250 WO HCPCS: Performed by: INTERNAL MEDICINE

## 2022-03-15 PROCEDURE — 94640 AIRWAY INHALATION TREATMENT: CPT

## 2022-03-15 PROCEDURE — 80076 HEPATIC FUNCTION PANEL: CPT

## 2022-03-15 PROCEDURE — 6360000002 HC RX W HCPCS: Performed by: INTERNAL MEDICINE

## 2022-03-15 PROCEDURE — 2580000003 HC RX 258: Performed by: INTERNAL MEDICINE

## 2022-03-15 PROCEDURE — 83880 ASSAY OF NATRIURETIC PEPTIDE: CPT

## 2022-03-15 PROCEDURE — 93306 TTE W/DOPPLER COMPLETE: CPT

## 2022-03-15 PROCEDURE — 84484 ASSAY OF TROPONIN QUANT: CPT

## 2022-03-15 PROCEDURE — 80048 BASIC METABOLIC PNL TOTAL CA: CPT

## 2022-03-15 PROCEDURE — 71250 CT THORAX DX C-: CPT

## 2022-03-15 PROCEDURE — 85025 COMPLETE CBC W/AUTO DIFF WBC: CPT

## 2022-03-15 PROCEDURE — 36415 COLL VENOUS BLD VENIPUNCTURE: CPT

## 2022-03-15 PROCEDURE — 83036 HEMOGLOBIN GLYCOSYLATED A1C: CPT

## 2022-03-15 PROCEDURE — 84443 ASSAY THYROID STIM HORMONE: CPT

## 2022-03-15 PROCEDURE — 93308 TTE F-UP OR LMTD: CPT

## 2022-03-15 PROCEDURE — 85651 RBC SED RATE NONAUTOMATED: CPT

## 2022-03-15 PROCEDURE — 2060000000 HC ICU INTERMEDIATE R&B

## 2022-03-15 RX ORDER — IPRATROPIUM BROMIDE AND ALBUTEROL SULFATE 2.5; .5 MG/3ML; MG/3ML
3 SOLUTION RESPIRATORY (INHALATION) EVERY 4 HOURS
Status: DISCONTINUED | OUTPATIENT
Start: 2022-03-15 | End: 2022-03-18 | Stop reason: HOSPADM

## 2022-03-15 RX ORDER — BUDESONIDE 0.25 MG/2ML
1000 INHALANT ORAL 2 TIMES DAILY
Status: DISCONTINUED | OUTPATIENT
Start: 2022-03-15 | End: 2022-03-18 | Stop reason: HOSPADM

## 2022-03-15 RX ORDER — TORSEMIDE 20 MG/1
10 TABLET ORAL
Status: DISCONTINUED | OUTPATIENT
Start: 2022-03-16 | End: 2022-03-18 | Stop reason: HOSPADM

## 2022-03-15 RX ADMIN — BUDESONIDE 1000 MCG: 0.25 SUSPENSION RESPIRATORY (INHALATION) at 19:44

## 2022-03-15 RX ADMIN — IPRATROPIUM BROMIDE AND ALBUTEROL SULFATE 3 ML: 2.5; .5 SOLUTION RESPIRATORY (INHALATION) at 11:53

## 2022-03-15 RX ADMIN — METHYLPREDNISOLONE SODIUM SUCCINATE 40 MG: 40 INJECTION, POWDER, LYOPHILIZED, FOR SOLUTION INTRAMUSCULAR; INTRAVENOUS at 14:16

## 2022-03-15 RX ADMIN — FERROUS SULFATE TAB 325 MG (65 MG ELEMENTAL FE) 325 MG: 325 (65 FE) TAB at 16:23

## 2022-03-15 RX ADMIN — DOXYCYCLINE 100 MG: 100 INJECTION, POWDER, LYOPHILIZED, FOR SOLUTION INTRAVENOUS at 12:02

## 2022-03-15 RX ADMIN — METHYLPREDNISOLONE SODIUM SUCCINATE 40 MG: 40 INJECTION, POWDER, LYOPHILIZED, FOR SOLUTION INTRAMUSCULAR; INTRAVENOUS at 22:35

## 2022-03-15 RX ADMIN — IPRATROPIUM BROMIDE AND ALBUTEROL SULFATE 3 ML: .5; 2.5 SOLUTION RESPIRATORY (INHALATION) at 19:44

## 2022-03-15 RX ADMIN — SODIUM CHLORIDE, PRESERVATIVE FREE 1000 MG: 5 INJECTION INTRAVENOUS at 14:16

## 2022-03-15 RX ADMIN — FERROUS SULFATE TAB 325 MG (65 MG ELEMENTAL FE) 325 MG: 325 (65 FE) TAB at 08:57

## 2022-03-15 RX ADMIN — ARFORMOTEROL TARTRATE 15 MCG: 15 SOLUTION RESPIRATORY (INHALATION) at 19:44

## 2022-03-15 RX ADMIN — DOXYCYCLINE 100 MG: 100 INJECTION, POWDER, LYOPHILIZED, FOR SOLUTION INTRAVENOUS at 22:32

## 2022-03-15 RX ADMIN — IPRATROPIUM BROMIDE AND ALBUTEROL SULFATE 3 ML: .5; 2.5 SOLUTION RESPIRATORY (INHALATION) at 23:07

## 2022-03-15 RX ADMIN — ARFORMOTEROL TARTRATE 15 MCG: 15 SOLUTION RESPIRATORY (INHALATION) at 09:08

## 2022-03-15 RX ADMIN — IPRATROPIUM BROMIDE AND ALBUTEROL SULFATE 3 ML: 2.5; .5 SOLUTION RESPIRATORY (INHALATION) at 09:08

## 2022-03-15 RX ADMIN — ENOXAPARIN SODIUM 40 MG: 100 INJECTION SUBCUTANEOUS at 08:57

## 2022-03-15 RX ADMIN — Medication 1 CAPSULE: at 08:57

## 2022-03-15 RX ADMIN — METHYLPREDNISOLONE SODIUM SUCCINATE 40 MG: 40 INJECTION, POWDER, LYOPHILIZED, FOR SOLUTION INTRAMUSCULAR; INTRAVENOUS at 06:33

## 2022-03-15 RX ADMIN — IPRATROPIUM BROMIDE AND ALBUTEROL SULFATE 3 ML: 2.5; .5 SOLUTION RESPIRATORY (INHALATION) at 15:14

## 2022-03-15 RX ADMIN — INSULIN LISPRO 1 UNITS: 100 INJECTION, SOLUTION INTRAVENOUS; SUBCUTANEOUS at 22:36

## 2022-03-15 RX ADMIN — BUDESONIDE 250 MCG: 0.25 SUSPENSION RESPIRATORY (INHALATION) at 09:08

## 2022-03-15 RX ADMIN — PANTOPRAZOLE SODIUM 40 MG: 40 TABLET, DELAYED RELEASE ORAL at 06:34

## 2022-03-15 ASSESSMENT — ENCOUNTER SYMPTOMS
SORE THROAT: 0
ABDOMINAL PAIN: 0
BACK PAIN: 0
CONSTIPATION: 0
VOMITING: 0
EYE PAIN: 0
COUGH: 1
SHORTNESS OF BREATH: 1
WHEEZING: 0
TROUBLE SWALLOWING: 0
PHOTOPHOBIA: 0
DIARRHEA: 0
RHINORRHEA: 0
NAUSEA: 0
CHEST TIGHTNESS: 0
APNEA: 0
EYE REDNESS: 0

## 2022-03-15 ASSESSMENT — PAIN SCALES - GENERAL
PAINLEVEL_OUTOF10: 0
PAINLEVEL_OUTOF10: 0

## 2022-03-15 NOTE — H&P
History and Physical      CHIEF COMPLAINT: Short of breath    History of Present Illness: 62-year-old male patient of Dr. Jennie Adams MS admitted follow. History obtained from patient as well as extensive review electronic record. He is on chronic oxygen at home; upon arrival patient's SPO2 was 88 on his oxygen. ABG done in the ED function PO2 of 55 pH 7.458 PCO2 30.0. The above done 5 L nasal cannula. He further states he is not sure if he ever improved since his February admission. He has noticed weight gain but no significant ankle edema. Normally he is not able to lie supine or prone because of his COPD. He notices even minimal activity, walking outside onto his porch and back causes dyspnea. He is known to have Mycobacterium avium complex and is on a azithromycin at home 3 days/week for suppression. Treatments for it were started 9/2019. He was most recently admitted to the hospital 2/2/2022 for similar dyspnea complaints. In the ED proBNP was elevated 2696; last proBNP was 647 on 2/2/2022.  2D echo is pending at this time. His last 2D echo with results as follows --     Summary   No evidence of tricuspid regurgitation. Left ventricle grossly normal in size. Normal left ventricular wall thickness. Estimated left ventricular ejection fraction is 57±5%. Septal hypokinesis - borderline. <50% criteria for diastolic dysfunction. The LAESV Index is <34ml/m2. Moderately dilated right ventricle. Right ventricle global systolic function is low normal .   Physiologic and/or trace mitral regurgitation is present. Trace aortic regurgitation is noted. Unable to accurately assess RV systolic pressure. Physiologic and/or trace pulmonic regurgitation present. Technically good quality study. No comparison study available. Suggest clinical correlation.        His chest x-ray in the ED with results as follows--    FINDINGS:   Mildly worsening pneumonia at the right base and left mid lung. Raymundo Fail is   stable pneumonia and or fibrosis at the left base.  Pulmonary hypertension is   suggested. Raymundo Fail is pronounced obstructive airways disease.       Impression:       Worsening pneumonia at the right base and left mid lung.  Other stable   findings as enumerated above. --Admission to the hospital 2021 due to acute heart failure with preserved ejection fraction  --Hospitalization 2021 due to acute respiratory failure due to SARS-CoV-2  --Found to have thoracic ascending aortic aneurysm 11/15/2018; proximal ascending aorta 3.8 cm  --Patient admitted 2019 due to parainfluenza virus pneumonia.  The above was extremely slow resolving with left upper lung infiltrate.  He then underwent bronchoscopy with BAL. --Fungal results became available approximately 2019, positive Fungitell. --On 2019 sensitivities were available and ID recommended starting intravenous amikacin 1400 mg IV daily; rifampin 600 mg by mouth daily; clarithromycin 1000 mg/day ethambutol 1200 mg daily by mouth.  --Mother  age 80, father  age 80. --Patient quit smoking , 92-pack-year history  --Patient has been vaccinated for COVID-19 and received booster   --Denies any prior history of rheumatic fever scarlet fever polio diphtheria cancer. Patient is known glucose intolerance and is currently on sliding scale insulin coverage; he is on IV methylprednisolone and glucose last evening was 319. Fasting glucose this morning 148. --Screening colonoscopy 2013 with polypectomy  --Molecular/PCR viral respiratory panel done yesterday all not detected. Legionella strep antigen not detected. Blood culture respiratory culture urine culture all pending.         Past Medical History:   Diagnosis Date    Acute and chronic respiratory failure with hypoxia ([address] 10/12/2017    Acute heart failure with preserved ejection fraction ([address] 2021    Acute respiratory disease due to severe acute respiratory syndrome coronavirus 2 (SARS-CoV-2) 01/01/2021    Acute respiratory failure with hypoxia ([address] 11/12/2018    Bullous emphysema ([address] 11/12/2018    Chronic back pain     Degeneration of umbar intervertebral disc    Colon cancer screening     COPD (chronic obstructive pulmonary disease) ([address]     Coronavirus infection 2/2/2022    Coronavirus 229E    Cough with hemoptysis 04/23/2019    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 ([address] 08/15/2019    First seen by ID; treatment started 9/4/2019    Emphysema lung ([address]     Glucose intolerance 06/10/2019    Hilar adenopathy 06/10/2019    Hypophosphatemia 06/10/2019    Immunocompromised ([address] 2/3/2022    Infection due to parainfluenza virus 3 06/10/2019    Iron deficiency 2/3/2022    Left upper lobe pneumonia 10/12/2017    Pleural effusion on right 10/03/2019    Pulmonary emphysema with fibrosis of lung ([address] 11/15/2018    Pulmonary Mycobacterium avium complex (MAC) infection ([address] 07/12/2019    BAL results finally completed this date    Rhinovirus infection 10/16/2020    Right lower lobe pneumonia 11/15/2018    Recurrent 4/23/19    S/P lumbar fusion 06/01/2019    Thoracic ascending aortic aneurysm ([address] 11/15/2018    Proximal ascending aorta; 3.8 cm; 11/15/18         Past Surgical History:   Procedure Laterality Date    ABSCESS DRAINAGE  2006    X2 RECTAL ABSCESS    BRONCHOSCOPY N/A 6/12/2019    BRONCHOSCOPY BRUSHINGS performed by Gerald Bach MD at [address] N/A 10/7/2019    BRONCHOSCOPY DIAGNOSTIC OR CELL [address] ONLY performed by Gerald Bach MD at [address] N/A 4/12/2021    BRONCHOSCOPY performed by Gerald [facility] at [facility] COLONOSCOPY  11/18/2013    HC INSERT PICC CATH, 5/> YRS  4/10/2021         LUMBAR FUSION  03/2019    [facility]       Medications Prior to Admission:    Medications Prior to Admission: benzonatate (TESSALON) 100 MG capsule, Take 100 mg by mouth 3 times daily as needed for Cough  guaiFENesin-dextromethorphan (ROBITUSSIN DM) 100-10 MG/5ML syrup, Take 5 mLs by mouth 4 times daily as needed for Cough  fluticasone-umeclidin-vilant (TRELEGY ELLIPTA) 100-62.5-25 MCG/INH AEPB, Inhale 1 puff into the lungs daily  ferrous sulfate (IRON 325) 325 (65 Fe) MG tablet, Take 1 tablet by mouth 2 times daily (with meals)  pantoprazole (PROTONIX) 40 MG tablet, Take 1 tablet by mouth every morning (before breakfast)  Probiotic Product (PROBIOTIC-10 PO), Take by mouth daily   etodolac (LODINE) 500 MG tablet, Take 500 mg by mouth daily  Fluticasone-Umeclidin-Vilant (TRELEGY ELLIPTA IN), Inhale 1 puff into the lungs daily  ipratropium-albuterol (DUONEB) 0.5-2.5 (3) MG/3ML SOLN nebulizer solution, Inhale 3 mLs into the lungs every 4 hours (while awake)  oxyCODONE-acetaminophen (PERCOCET) 7.5-325 MG per tablet, Take 1 tablet by mouth 2 times daily as needed for Pain. Handicap Placard MISC, by Does not apply route Patient cannot walk 200 ft without stopping to rest.   Expiration 5 yrs. Respiratory Therapy Supplies (FULL KIT NEBULIZER SET) MISC, Use as directed with nebulized medication. Allergies:    Patient has no known allergies. Social History:    reports that he quit smoking about 7 years ago. His smoking use included cigarettes. He started smoking about 53 years ago. He has a 92.00 pack-year smoking history. He has never used smokeless tobacco. He reports that he does not drink alcohol and does not use drugs. Family History:   family history includes Other in his father and mother.     REVIEW OF SYSTEMS:  As above in the HPI, otherwise negative    PHYSICAL EXAM:    VS: /78   Pulse 91   Temp 97.4 °F (36.3 °C) (Axillary)   Resp 20   Ht 6' 1\" (1.854 m)   Wt 193 lb (87.5 kg)   SpO2 91%   BMI 25.46 kg/m²     General appearance: Alert, Awake, Oriented times 3, no distress, nasal cannula oxygen in place  Skin: Warm and dry ; no rashes  Head: Normocephalic. No masses, lesions or tenderness noted  Eyes: Conjunctivae pink, sclera white. PERRL,EOM-I  Ears: External ears normal  Nose/Sinuses: Nares normal. Septum midline. Mucosa normal. No drainage  Oropharynx: Oropharynx clear with no exudate seen  Neck: Supple. No jugular venous distension, lymphadenopathy or thyromegaly Trachea midline  Lungs: Decreased excursion but not much wheeze or rhonchi  Heart: S1 S2  Regular rate and rhythm. No rub, murmur or gallop  Abdomen: Soft, non-tender. BS normal. No masses, organomegaly; no rebound or guarding  Extremities: No edema, Peripheral pulses palpable  Musculoskeletal: Muscular strength appears intact. Neuro:  No focal motor defects ; II-XII grossly intact .  GLASER equally  Breast: deferred  Rectal: deferred  Genitalia:  deferred    LABS:  CBC:   Lab Results   Component Value Date    WBC 5.8 03/15/2022    RBC 4.96 03/15/2022    HGB 14.9 03/15/2022    HCT 44.4 03/15/2022    MCV 89.5 03/15/2022    MCH 30.0 03/15/2022    MCHC 33.6 03/15/2022    RDW 15.0 03/15/2022     03/15/2022    MPV 9.3 03/15/2022     CBC with Differential:    Lab Results   Component Value Date    WBC 5.8 03/15/2022    RBC 4.96 03/15/2022    HGB 14.9 03/15/2022    HCT 44.4 03/15/2022     03/15/2022    MCV 89.5 03/15/2022    MCH 30.0 03/15/2022    MCHC 33.6 03/15/2022    RDW 15.0 03/15/2022    NRBC 0.0 02/04/2022    LYMPHOPCT 5.1 03/15/2022    MONOPCT 0.5 03/15/2022    MYELOPCT 0.9 04/07/2021    BASOPCT 0.2 03/15/2022    MONOSABS 0.03 03/15/2022    LYMPHSABS 0.30 03/15/2022    EOSABS 0.00 03/15/2022    BASOSABS 0.01 03/15/2022     Hemoglobin/Hematocrit:    Lab Results   Component Value Date    HGB 14.9 03/15/2022    HCT 44.4 03/15/2022     CMP:    Lab Results   Component Value Date     03/15/2022    K 3.9 03/15/2022    K 4.7 03/14/2022     03/15/2022    CO2 22 03/15/2022    BUN 9 03/15/2022    CREATININE 0.7 03/15/2022    GFRAA >60 03/15/2022    LABGLOM >60 03/15/2022 GLUCOSE 148 03/15/2022    PROT 6.5 03/15/2022    LABALBU 3.9 03/15/2022    CALCIUM 9.4 03/15/2022    BILITOT 0.4 03/15/2022    ALKPHOS 67 03/15/2022    AST 19 03/15/2022    ALT 15 03/15/2022     BMP:    Lab Results   Component Value Date     03/15/2022    K 3.9 03/15/2022    K 4.7 03/14/2022     03/15/2022    CO2 22 03/15/2022    BUN 9 03/15/2022    LABALBU 3.9 03/15/2022    CREATININE 0.7 03/15/2022    CALCIUM 9.4 03/15/2022    GFRAA >60 03/15/2022    LABGLOM >60 03/15/2022    GLUCOSE 148 03/15/2022     Hepatic Function Panel:    Lab Results   Component Value Date    ALKPHOS 67 03/15/2022    ALT 15 03/15/2022    AST 19 03/15/2022    PROT 6.5 03/15/2022    BILITOT 0.4 03/15/2022    BILIDIR <0.2 03/15/2022    IBILI see below 03/15/2022    LABALBU 3.9 03/15/2022     Ionized Calcium:  No results found for: IONCA  Magnesium:    Lab Results   Component Value Date    MG 2.0 03/15/2022     Phosphorus:    Lab Results   Component Value Date    PHOS 2.6 03/15/2022     LDH:    Lab Results   Component Value Date     01/02/2021     Uric Acid:    Lab Results   Component Value Date    LABURIC 4.6 03/15/2022     PT/INR:    Lab Results   Component Value Date    PROTIME 12.5 05/30/2019    INR 1.1 05/30/2019     Warfarin PT/INR:  No components found for: PTPATWAR, PTINRWAR  PTT:    Lab Results   Component Value Date    APTT 31.4 05/30/2019   [APTT}  Troponin:    Lab Results   Component Value Date    TROPONINI <0.01 04/08/2021     Last 3 Troponin:    Lab Results   Component Value Date    TROPONINI <0.01 04/08/2021    TROPONINI <0.01 04/08/2021    TROPONINI <0.01 01/02/2021     U/A:    Lab Results   Component Value Date    COLORU Yellow 02/01/2022    PROTEINU Negative 02/01/2022    PHUR 5.5 02/01/2022    LABCAST FEW 10/02/2019    WBCUA NONE 04/07/2021    RBCUA NONE 04/07/2021    MUCUS Present 04/07/2021    BACTERIA FEW 04/07/2021    CLARITYU Clear 02/01/2022    SPECGRAV 1.020 02/01/2022    LEUKOCYTESUR Negative 02/01/2022    UROBILINOGEN 0.2 02/01/2022    BILIRUBINUR MODERATE 02/01/2022    BLOODU Negative 02/01/2022    GLUCOSEU Negative 02/01/2022     ABG:    Lab Results   Component Value Date    PH 7.458 03/14/2022    PCO2 30.0 03/14/2022    PO2 55.0 03/14/2022    HCO3 20.8 03/14/2022    BE -1.7 03/14/2022    O2SAT 90.0 03/14/2022     HgBA1c:    Lab Results   Component Value Date    LABA1C 5.7 03/15/2022     FLP:    Lab Results   Component Value Date    TRIG 47 03/15/2022    HDL 62 03/15/2022    LDLCALC 74 03/15/2022    LABVLDL 9 03/15/2022     TSH:    Lab Results   Component Value Date    TSH 0.455 03/15/2022     VITAMIN B12: No components found for: B12  FOLATE:    Lab Results   Component Value Date    FOLATE 16.4 03/14/2022     IRON:    Lab Results   Component Value Date    IRON 45 02/02/2022     Iron Saturation:  No components found for: PERCENTFE  TIBC:    Lab Results   Component Value Date    TIBC 339 02/02/2022     FERRITIN:    Lab Results   Component Value Date    FERRITIN 63 02/02/2022       RADIOLOGY:  XR CHEST PORTABLE   Final Result   Worsening pneumonia at the right base and left mid lung. Other stable   findings as enumerated above.              ASSESSMENT:      Active Hospital Problems    Diagnosis     Acute exacerbation of chronic obstructive pulmonary disease (COPD) ([facility] [J44.1]     Immunocompromised ([facility] [D84.9]     Acute heart failure with preserved ejection fraction (HCC) [I50.31]     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 ([facility] [A31.2]     Glucose intolerance [E74.39]     Thoracic ascending aortic aneurysm ([facility]) [I71.2]     Bullous emphysema ([facility] [J43.9]     Acute and chronic respiratory failure with hypoxia ([facility]) [J96.21]     COPD (chronic obstructive pulmonary disease) (HCC) [J44.9]        PLAN:  Medications discussed with patient  GI prophylaxis  DVT prophylaxis  Pulmonary has been consulted  Cardiology has been consulted  Recheck 2D echo  Arformoterol 15 mcg twice daily  Azithromycin 500 mg Monday Wednesday Friday  Budesonide 2 and 50 mcg twice daily  Ceftriaxone 1 g IV every 24 hours  Doxycycline 100 mg IV every 12 hours  Enoxaparin 40 mg subcu daily  Ferrous sulfate 325 mg twice daily  Low-dose sliding scale insulin  DuoNeb every 4 hours while awake  Methylprednisolone 40 mg IV every 8 hours  Percocet 7.5 mg every 12 hours as needed for pain         Please note that over 50 minutes was spent in evaluating the patient, review of records and results, discussion with staff/family, etc.    [address]    10:51 AM  3/15/2022    Voice recognition software use for dictation

## 2022-03-15 NOTE — CARE COORDINATION
Attempted to meet with patient to discuss plan of care and discharge planning, however pt out of room and unavailable. Per chart review pt resides at home with his wife. Pt has home oxygen, 6 L through [address]

## 2022-03-15 NOTE — PLAN OF CARE
Problem: Breathing Pattern - Ineffective:  Goal: Ability to achieve and maintain a regular respiratory rate will improve  Description: Ability to achieve and maintain a regular respiratory rate will improve  3/14/2022 2230 by Naye Yang RN  Outcome: Ongoing  3/14/2022 1844 by Kalee Huizar RN  Outcome: Ongoing

## 2022-03-15 NOTE — PROGRESS NOTES
[facility] Quality Flow/Interdisciplinary Rounds Progress Note        Quality Flow Rounds held on March 15, 2022    Disciplines Attending:  Bedside Nurse,  and     Hal Clancy was admitted on 3/14/2022 12:53 PM    Anticipated Discharge Date:  Expected Discharge Date: 04/23/22    Disposition:    Sandro Score:  Sandro Scale Score: 22    Readmission Risk              Risk of Unplanned Readmission:  11           Discussed patient goal for the day, patient clinical progression, and barriers to discharge. The following Goal(s) of the Day/Commitment(s) have been identified:  wean oxygen as tolerated, check consults' plans.       Kasie Portillo RN  March 15, 2022

## 2022-03-15 NOTE — CONSULTS
CARDIOLOGY CONSULTATION    Patient Name:  Clark Sarah    :  1950    Reason for Consultation:   Shortness of breath and chest discomfort    History of Present Illness:   Clark Sarah returns to [facility], having recently been hospitalized for a of this year and feels no better than when he left. He remains quite short of breath. He also received a follow-up COVID-19 injection he developed shortness of breath and a cough with generalized malaise. His symptoms rapidly progressed and he came to the emergency room for further evaluation. .  General nonexertional chest discomfort denies palpitations or lightheadedness. Admitted for further evaluation and treatment. 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 Surgical History:   has a past surgical history that includes Abscess Drainage (); Colonoscopy (2013); lumbar fusion (2019); bronchoscopy (N/A, 2019); bronchoscopy (N/A, 10/7/2019);  Insert Picc Cath, 5/> Yrs (4/10/2021); and bronchoscopy (N/A, 2021). Social History:   reports that he quit smoking about 7 years ago. His smoking use included cigarettes. He started smoking about 53 years ago. He has a 92.00 pack-year smoking history. He has never used smokeless tobacco. He reports that he does not drink alcohol and does not use drugs. Family History:  family history includes Other in his father and mother. Both parents  secondary to meniscus of old age. Medications:  Prior to Admission medications    Medication Sig Start Date End Date Taking? Authorizing Provider   benzonatate (TESSALON) 100 MG capsule Take 100 mg by mouth 3 times daily as needed for Cough   Yes Historical Provider, MD   guaiFENesin-dextromethorphan (ROBITUSSIN DM) 100-10 MG/5ML syrup Take 5 mLs by mouth 4 times daily as needed for Cough   Yes Historical Provider, MD   fluticasone-umeclidin-vilant (TRELEGY ELLIPTA) 100-62.5-25 MCG/INH AEPB Inhale 1 puff into the lungs daily 22  Yes Nathaniel Showers, APRN - NP   ferrous sulfate (IRON 325) 325 (65 Fe) MG tablet Take 1 tablet by mouth 2 times daily (with meals) 22  Yes Ran Jaimes, DO   pantoprazole (PROTONIX) 40 MG tablet Take 1 tablet by mouth every morning (before breakfast) 22  Yes Annette Jaimes,    Probiotic Product (PROBIOTIC-10 PO) Take by mouth daily    Yes Historical Provider, MD   etodolac (LODINE) 500 MG tablet Take 500 mg by mouth daily   Yes Historical Provider, MD   Fluticasone-Umeclidin-Vilant (TRELEGY ELLIPTA IN) Inhale 1 puff into the lungs daily   Yes Historical Provider, MD   ipratropium-albuterol (DUONEB) 0.5-2.5 (3) MG/3ML SOLN nebulizer solution Inhale 3 mLs into the lungs every 4 hours (while awake) 21  Yes Arsalan Corley,    oxyCODONE-acetaminophen (PERCOCET) 7.5-325 MG per tablet Take 1 tablet by mouth 2 times daily as needed for Pain.   3/11/20  Yes Historical Provider, MD   Handicap Placard MISC by Does not apply route Patient cannot walk 200 ft without stopping to rest.    Expiration 5 yrs. 8/19/21   Saba Braun MD   Respiratory Therapy Supplies (FULL KIT NEBULIZER SET) MISC Use as directed with nebulized medication. 4/13/21   Rodney Jaimes DO       Allergies:  Patient has no known allergies. Review of Systems:   · Constitutional: there has been no unanticipated weight loss. There's been no significant change in energy level, sleep pattern or activity level. No fever chills or rigors. · Eyes: No visual changes or diplopia. No scleral icterus. · ENT: No Headaches, hearing loss or vertigo. No mouth sores or sore throat. No change in taste or smell. · Cardiovascular: No chest discomfort, dyspnea on exertion, palpitations, loss of consciousness, no phlebitis, no claudication. · Respiratory: No cough or wheezing, no sputum production. No hemoptysis, pleuritic pain. · Gastrointestinal: No abdominal pain, appetite loss, blood in stools. No change in bowel habits. No hematemesis  · Genitourinary: No dysuria, trouble voiding or hematuria. No nocturia or increased frequency. · Musculoskeletal:  No gait disturbance, weakness or joint complaints. · Integumentary: No rash or pruritis. · Neurological: No headache, diplopia, change in muscle strength, numbness or tingling. No change in gait, balance, coordination, mood, affect, memory, mentation, behavior. · Psychiatric: No anxiety or depression. · Endocrine: No temperature intolerance. No excessive thirst, fluid intake, or urination. No tremor. · Hematologic/Lymphatic: No abnormal bruising or bleeding, blood clots or swollen lymph nodes. · Allergic/Immunologic: No nasal congestion or hives. Physical Examination:    Vital Signs: /78   Pulse 91   Temp 97.4 °F (36.3 °C) (Axillary)   Resp 22   Ht 6' 1\" (1.854 m)   Wt 193 lb (87.5 kg)   SpO2 92%   BMI 25.46 kg/m²   General appearance: Well preserved, mesomorphic body habitus, alert, no distress.   Skin: Skin color, texture, turgor normal. No rashes or lesions. No induration or tightening palpated. Head: Normocephalic. No masses, lesions, tenderness or abnormalities  Eyes: Conjunctivae/corneas clear. PERRL, EOMs intact. Sclera non icteric. Ears: External ears normal. Canals clear. TM's clear bilaterally. Hearing normal to finger rub. Nose/Sinuses: Nares normal. Septum midline. Mucosa normal. No drainage or sinus tenderness. Oropharynx: Lips, mucosa, and tongue normal. Oropharynx clear with no exudate seen. Neck: Neck supple and symmetric. No adenopathy. Thyroid symmetric, normal size, without nodules. Trachea is midline. Carotids brisk in upstroke without bruits, no abnormal JVP noted at 45°. Chest: Even excursion  Lungs: Lungs with coarse expiratory rhonchi to auscultation bilaterally. No retractions or use of accessory muscles. No tactile vocal fremitus. , crackles or rales. Heart:  S1 > S2. Regular rhythm. No gallop or murmur. No rub, palpable thrill or heave noted. PMI 5th intercostal space midclavicular line. Abdomen: Abdomen soft, mildly protuberant, non-tender. BS normal. No masses, organomegaly. No hernia noted. Extremities: Extremities normal. No deformities, edema, or skin discoloration. No cyanosis or clubbing noted to the nails. Peripheral pulses present 2+ upper extremities and present 2+  lower extremities. Musculoskeletal: Spine ROM normal. Muscular strength intact. Neuro: Cranial nerves intact. Motor: Strength 5/5 in all extremities. Reflexes 2+ in all extremities. No focal weakness. Sensory: grossly normal to touch. Coordination intact. Pertinent Labs:  CBC:   Recent Labs     03/14/22  1316 03/15/22  0348   WBC 7.6 5.8   HGB 16.2 14.9    256     BMP:  Recent Labs     03/14/22  1316 03/14/22  1316 03/15/22  0348     --  134   K 4.7  --  3.9     --  100   CO2 24  --  22   BUN 9  --  9   CREATININE 0.8  --  0.7   GLUCOSE 100*  --  148*   LABGLOM >60   < > >60    < > = values in this interval not displayed. ABGs:   Lab Results   Component Value Date    PH 7.458 2022    PO2 55.0 2022    PCO2 30.0 2022     INR:   No results for input(s): INR in the last 72 hours. PRO-BNP:   Lab Results   Component Value Date    PROBNP 1,632 (H) 03/15/2022    PROBNP 2,696 (H) 2022      Cardiac Injury Profile:   No results for input(s): CKTOTAL, CKMB, CKMBINDEX, TROPONINI in the last 72 hours. Lipid Profile:   Lab Results   Component Value Date    TRIG 47 03/15/2022    HDL 62 03/15/2022    LDLCALC 74 03/15/2022    CHOL 145 03/15/2022      Hemoglobin A1C: No components found for: HGBA1C   ECG:  See report  ECHO: 2019-no significant change on 3/15/2022 study. Summary   Left ventricle grossly normal in size.   Normal LV segmental wall motion.   Normal left ventricular wall thickness.   Estimated left ventricular ejection fraction is 62±5%.   <50% criteria for diastolic dysfunction.   The LAESV Index is <34ml/m2.   Moderately dilated right ventricle.   Right ventricular segmental wall motion is normal.   Physiologic and/or trace mitral regurgitation is present.   Trace-mild aortic regurgitation is noted.   Physiologic and/or trace tricuspid regurgitation.  RVSP is 29 mmHg.   Physiologic and/or trace pulmonic regurgitation present.   Technically fair quality study. Radiology:  Xr Chest Standard (2 Vw)    Result Date: 2019  Patient MRN: [id number] : 1950 Age:  79 years Gender: Male Order Date: 2019 12:00 PM Exam: XR CHEST (2 VW) Number of Images: 2 views Indication:   chest pain chest pain shortness of breath Comparison: Prior chest radiograph from 2019 is available Findings: Examination of the chest in PA and lateral views demonstrate hyperaeration of lungs suggesting of chronic obstructive pulmonary disease. There is interstitial fibrosis seen in both lung bases. There is flattening of both hemidiaphragms.  There is no evidence of airspace consolidation or pulmonary venous congestion. There is pleural reaction seen in both costophrenic sulcus more prominent on the right as compared to the left. . The heart is normal in size and configuration. The trachea is in the midline. The mediastinum and appears normal. Is uncoiling atherosclerotic change of thoracic aorta. IMPRESSION: Chronic obstructive pulmonary disease Unchanged from prior radiograph     Xr Chest Portable    Result Date: 2019  Patient MRN:  [id number] : 1950 Age: 79 years Gender: Male Order Date:  2019 11:30 AM EXAM: XR CHEST PORTABLE INDICATION:  COPD COPD COMPARISON: 2019 FINDINGS:  There is significant hyperinflation of the lungs and significant generalized parenchymal fibrosis. There is pleural thickening in the right costophrenic angle. There is a new infiltrate in the left mid hemithorax. Significant COPD and bilateral parenchymal fibrosis as well as pleural thickening in the right costophrenic angle. This is unchanged. New infiltrate suggested in left mid hemithorax     Xr Eye Foreign Body    Result Date: 2019  Patient MRN: [id number] : 1950 Age:  79 years Gender: Male Order Date: 2019 2:30 PM Exam: XR EYE FOREIGN BODY Number of Images: 2 views Indication: Foreign body Comparison: None. 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  *      Plan:   Mr. Alli Trinidad significant dyspnea which is related to what a moderately enlarged right heart with preserved left ventricular systolic function and no evidence of diastolic dysfunction yet proBNP mildly elevated. Heart was enlarged and noted even on his nuclear stress test from 2020. Initiate low-dose diuretic aggressive pulmonary care. Will obtain a nuclear stress test and if abnormal which strongly consider Raynold Sequin if his shortness of breath is related to underlying ischemic heart disease  I have spent more than 45 minutes face to face with Randi Luis reviewing notes and laboratory data with greater than 50% of this time instructing and counseling the patient regarding my findings and recommendations and I have answered all questions as posed to me by  Nitish Kwon. Thank you,Jim Merlos and Savage Cortés MD for allowing me to consult in the care of this patient. Abdirizak Estrada DO DO, FACP, [facility], [facility]    NOTE:  This report was transcribed using voice recognition software. Every effort was made to ensure accuracy; however, inadvertent computerized transcription errors may be present.

## 2022-03-15 NOTE — PROGRESS NOTES
Occupational Therapy  Occupational therapy orders received, pt refused out of bed activities this date d/t fatigue and SOB. Will attempt at another time/date.      [address] [facility] [place], OTR/L #[id number]

## 2022-03-15 NOTE — PLAN OF CARE
Problem: Breathing Pattern - Ineffective:  Goal: Ability to achieve and maintain a regular respiratory rate will improve  Description: Ability to achieve and maintain a regular respiratory rate will improve  3/15/2022 1018 by Cruz Washington RN  Outcome: Ongoing  3/14/2022 2230 by Abeba Pollock RN  Outcome: Ongoing

## 2022-03-15 NOTE — CONSULTS
Pulmonary Consultation    Admit Date: 3/14/2022  Requesting Physician: Latrell Giron MD    Reason for consultation:  · COPD  HPI:  · Idalia Jc is a 79-year-old male with end-stage chronic obstructive pulmonary disease with superimposed Mycobacterium avium infection,, now on chronic suppression. He presents to the hospital with increasing shortness of breath or denotes his activities of daily living are again become increasingly limited. Hospitalized earlier this year, the patient notes no significant improvement. · Chest rate graft ordered by myself earlier in the day shows a few new spiculated nodules in the right middle lobe. Otherwise, spent substantial clearing of previously identified dense infiltrates. There is been no fever or chills. There is been no night sweats. There is been no hemoptysis.     PMH:    Past Medical History:   Diagnosis Date    Acute and chronic respiratory failure with hypoxia ([address] 10/12/2017    Acute heart failure with preserved ejection fraction ([address] 4/8/2021    Acute respiratory disease due to severe acute respiratory syndrome coronavirus 2 (SARS-CoV-2) 01/01/2021    Acute respiratory failure with hypoxia (HCC) 11/12/2018    Bullous emphysema ([address] 11/12/2018    Chronic back pain     Degeneration of umbar intervertebral disc    Colon cancer screening     COPD (chronic obstructive pulmonary disease) ([address]     Coronavirus infection 2/2/2022    Coronavirus 229E    Cough with hemoptysis 04/23/2019    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 ([address] 08/15/2019    First seen by ID; treatment started 9/4/2019    Emphysema lung ([address]     Glucose intolerance 06/10/2019    Hilar adenopathy 06/10/2019    Hypophosphatemia 06/10/2019    Immunocompromised ([address] 2/3/2022    Infection due to parainfluenza virus 3 06/10/2019    Iron deficiency 2/3/2022    Left upper lobe pneumonia 10/12/2017    Pleural effusion on right 10/03/2019  Pulmonary emphysema with fibrosis of lung ([facility] 11/15/2018    Pulmonary Mycobacterium avium complex (MAC) infection ([facility] 07/12/2019    BAL results finally completed this date    Rhinovirus infection 10/16/2020    Right lower lobe pneumonia 11/15/2018    Recurrent 4/23/19    S/P lumbar fusion 06/01/2019    Thoracic ascending aortic aneurysm ([facility] 11/15/2018    Proximal ascending aorta; 3.8 cm; 11/15/18     PSH:   Past Surgical History:   Procedure Laterality Date    ABSCESS DRAINAGE  2006    X2 RECTAL ABSCESS    BRONCHOSCOPY N/A 6/12/2019    BRON[address]OS[address]INGS [address] Monisha Sherman MD [address] N/A 10/7/2019    BRONCHOSCOPY DIAGNOSTIC OR CELL [address] ONLY performed [address] Monisha Sherman MD [address] N/A 4/12/2021    BRONCHOSCOPY performed by Monisha Sherman MD at [facility] COLONOSCOPY  11/18/2013    HC INSERT PICC CATH, 5/> YRS  4/10/2021         LUMBAR FUSION  03/2019    [facility]       Review of Systems:   · Constitutional: As noted in the HPI. · Eyes: No visual changes or diplopia. No scleral icterus. · ENT: No headaches, hearing loss or vertigo. No nasal congestion, or sore throat. · Cardiovascular: No chest pain, dyspnea on exertion, or palpitations. · Respiratory: See above  · Gastrointestinal: No abdominal pain, nausea or emesis. No diarrhea or rectal bleeding or melena. No change in bowel habits. · Genitourinary: No dysuria, urinary frequency, or incontinence. No hematuria. · Musculoskeletal: No gait disturbance, weakness or joint complaints. · Integumentary: No rash or pruritis. No abnormal pigmentation, hair or nail changes. · Neurological: No headache, diplopia, dizziness, tremor, change in muscle strength, numbness or tingling. No change in gait, balance, coordination, mood, affect, memory, mentation, behavior. · Psychiatric: No anxiety or depression.   · Endocrine: No temperature intolerance, excessive thirst, fluid intake, urinary frequency, excessive appetite, or recent weight change. · Hematologic/Lymphatic: No abnormal bruising or bleeding, blood clots or swollen lymph nodes. No anemia, fever, chills, night sweats, or swollen glands. · Allergic/Immunologic: No seasonal or perenial allergies. No history of hives or atopic dermatitis. Social History:  · Alcohol:  No  · Tobacco:   Past  · Employment:  no silica or asbestos exposure  · Family:  No family history of lung disease    Medications:   dextrose        doxycycline (VIBRAMYCIN) IV  100 mg IntraVENous Q12H    [START ON 3/16/2022] torsemide  10 mg Oral Once per day on     azithromycin  500 mg Oral Once per day on     ferrous sulfate  325 mg Oral BID WC    ipratropium-albuterol  3 mL Inhalation Q4H WA    lactobacillus  1 capsule Oral Daily    pantoprazole  40 mg Oral QAM AC    enoxaparin  40 mg SubCUTAneous Daily    Arformoterol Tartrate  15 mcg Nebulization BID    budesonide  250 mcg Nebulization BID    insulin lispro  0-6 Units SubCUTAneous TID WC    insulin lispro  0-3 Units SubCUTAneous Nightly    methylPREDNISolone  40 mg IntraVENous Q8H       Vitals:  Tmax:  VITALS:  /78   Pulse 91   Temp 97.4 °F (36.3 °C) (Axillary)   Resp 22   Ht 6' 1\" (1.854 m)   Wt 193 lb (87.5 kg)   SpO2 92%   BMI 25.46 kg/m²   24HR INTAKE/OUTPUT:      Intake/Output Summary (Last 24 hours) at 3/15/2022 1804  Last data filed at 3/15/2022 1302  Gross per 24 hour   Intake    Output 1250 ml   Net -1250 ml     CURRENT PULSE OXIMETRY:  SpO2: 92 %  24HR PULSE OXIMETRY RANGE:  SpO2  Av.5 %  Min: 80 %  Max: 98 %    EXAM:  General: No distress. Alert. Eyes: PERRL. No sclera icterus. No conjunctival injection. ENT: No discharge. Pharynx clear. Neck: Trachea midline. Normal thyroid. No jvd, no hjr. Resp: No wheezing. No accessory muscle use. No rales. No rhonchi. Diffusely decreased. CV: Regular rate. Regular rhythm. No murmur No rub. rub.    Abd: Non-tender. Non-distended. No masses. No organmegaly. Normal bowel sounds. Skin: Warm and dry. No nodule on exposed extremities. No rash on exposed extremities. Lymph: No cervical LAD. No supraclavicular LAD. Ext: No joint deformity. No clubbing. No cyanosis. No edema  Neuro: Awake. Follows commands. Positive pupils/gag/corneals. Normal pain response. Lab Results:  CBC:   Recent Labs     03/14/22  1316 03/15/22  0348   WBC 7.6 5.8   HGB 16.2 14.9   HCT 48.6 44.4   MCV 91.5 89.5    256       BMP:  Recent Labs     03/14/22  1316 03/15/22  0348    134   K 4.7 3.9    100   CO2 24 22   PHOS  --  2.6   BUN 9 9   CREATININE 0.8 0.7    ALB:3,BILIDIR:3,BILITOT:3,ALKPHOS:3)@    PT/INR: No results for input(s): PROTIME, INR in the last 72 hours. Cultures:  Sputum: not available  Blood: not available    ABG:   Recent Labs     03/14/22  1333   PH 7.458*   PO2 55.0*   PCO2 30.0*   HCO3 20.8*   BE -1.7   O2SAT 90.0*   METHB 0.3   O2HB 88.0*   COHB 1.9*   O2CON 19.9   HHB 9.8*   THB 16.1             Films:     CT CHEST WO CONTRAST   Final Result   Advanced centrilobular emphysema with extensive scarring, fibrosis and   bullous changes. Resolution of the previously noted infiltrative mass in the left upper lobe   with the extensive  spiculated and infiltrative nodules and masses some of   which are new especially the 1 in the right middle lobe which are   indeterminate for developing malignancy. Close surveillance according to   Fleischner society guidelines is recommended. Borderline cardiac size with the tiny pericardial effusion and coronary   artery calcification. XR CHEST PORTABLE   Final Result   Worsening pneumonia at the right base and left mid lung. Other stable   findings as enumerated above. .        Assessment:  1. Severe bullous emphysema with associated hypoxia  2. New middle lobe nodules: Inflammatory versus malignant      Plan:  1.  Maximize treatment for COPD  2. Supplemental oxygen      Thanks for letting us see this patient in consultation. Total time in reviewing the previous admissions and records, reviewing the current x-rays, labs, and discussing with clinical staff including nursing and physicians, exceeded 50 minutes. Please contact us with any questions. Office [phone] or after hours through [place], x [phone]. Please note that voice recognition technology was used (while wearing a Covid mandated mask) in the preparation of this note and make therefore it may contain inadvertent transcription errors. If the patient is a COVID 19 isolation patient, the above physical exam reflects that of the examining physician for the day. Yaz Edwards MD,  M.D., F.C.C.P.     [facility], [address] [facility], [address] [place] [address] Street, [facility]

## 2022-03-16 LAB
ALBUMIN SERPL-MCNC: 4.3 G/DL (ref 3.5–5.2)
ALP BLD-CCNC: 76 U/L (ref 40–129)
ALT SERPL-CCNC: 14 U/L (ref 0–40)
ANION GAP SERPL CALCULATED.3IONS-SCNC: 12 MMOL/L (ref 7–16)
AST SERPL-CCNC: 17 U/L (ref 0–39)
BASOPHILS ABSOLUTE: 0 E9/L (ref 0–0.2)
BASOPHILS RELATIVE PERCENT: 0 % (ref 0–2)
BILIRUB SERPL-MCNC: 0.3 MG/DL (ref 0–1.2)
BILIRUBIN DIRECT: <0.2 MG/DL (ref 0–0.3)
BILIRUBIN, INDIRECT: NORMAL MG/DL (ref 0–1)
BUN BLDV-MCNC: 13 MG/DL (ref 6–23)
C-REACTIVE PROTEIN: 0.3 MG/DL (ref 0–0.4)
CALCIUM SERPL-MCNC: 10.2 MG/DL (ref 8.6–10.2)
CHLORIDE BLD-SCNC: 102 MMOL/L (ref 98–107)
CO2: 24 MMOL/L (ref 22–29)
CREAT SERPL-MCNC: 0.6 MG/DL (ref 0.7–1.2)
EOSINOPHILS ABSOLUTE: 0 E9/L (ref 0.05–0.5)
EOSINOPHILS RELATIVE PERCENT: 0 % (ref 0–6)
GFR AFRICAN AMERICAN: >60
GFR NON-AFRICAN AMERICAN: >60 ML/MIN/1.73
GLUCOSE BLD-MCNC: 129 MG/DL (ref 74–99)
HCT VFR BLD CALC: 48.2 % (ref 37–54)
HEMOGLOBIN: 15.7 G/DL (ref 12.5–16.5)
LACTIC ACID, SEPSIS: 3.6 MMOL/L (ref 0.5–1.9)
LYMPHOCYTES ABSOLUTE: 0.13 E9/L (ref 1.5–4)
LYMPHOCYTES RELATIVE PERCENT: 0.9 % (ref 20–42)
MAGNESIUM: 2 MG/DL (ref 1.6–2.6)
MCH RBC QN AUTO: 30.5 PG (ref 26–35)
MCHC RBC AUTO-ENTMCNC: 32.6 % (ref 32–34.5)
MCV RBC AUTO: 93.8 FL (ref 80–99.9)
METER GLUCOSE: 110 MG/DL (ref 74–99)
METER GLUCOSE: 116 MG/DL (ref 74–99)
METER GLUCOSE: 130 MG/DL (ref 74–99)
METER GLUCOSE: 179 MG/DL (ref 74–99)
MONOCYTES ABSOLUTE: 0 E9/L (ref 0.1–0.95)
MONOCYTES RELATIVE PERCENT: 0 % (ref 2–12)
NEUTROPHILS ABSOLUTE: 12.67 E9/L (ref 1.8–7.3)
NEUTROPHILS RELATIVE PERCENT: 99.1 % (ref 43–80)
NUCLEATED RED BLOOD CELLS: 0 /100 WBC
PDW BLD-RTO: 15.7 FL (ref 11.5–15)
PHOSPHORUS: 3.1 MG/DL (ref 2.5–4.5)
PLATELET # BLD: 258 E9/L (ref 130–450)
PMV BLD AUTO: 9.6 FL (ref 7–12)
POLYCHROMASIA: ABNORMAL
POTASSIUM SERPL-SCNC: 3.9 MMOL/L (ref 3.5–5)
RBC # BLD: 5.14 E12/L (ref 3.8–5.8)
SEDIMENTATION RATE, ERYTHROCYTE: 2 MM/HR (ref 0–15)
SODIUM BLD-SCNC: 138 MMOL/L (ref 132–146)
TOTAL PROTEIN: 7 G/DL (ref 6.4–8.3)
WBC # BLD: 12.8 E9/L (ref 4.5–11.5)

## 2022-03-16 PROCEDURE — 2500000003 HC RX 250 WO HCPCS: Performed by: INTERNAL MEDICINE

## 2022-03-16 PROCEDURE — 6360000002 HC RX W HCPCS: Performed by: INTERNAL MEDICINE

## 2022-03-16 PROCEDURE — 36415 COLL VENOUS BLD VENIPUNCTURE: CPT

## 2022-03-16 PROCEDURE — 84100 ASSAY OF PHOSPHORUS: CPT

## 2022-03-16 PROCEDURE — 97165 OT EVAL LOW COMPLEX 30 MIN: CPT

## 2022-03-16 PROCEDURE — 85651 RBC SED RATE NONAUTOMATED: CPT

## 2022-03-16 PROCEDURE — 2700000000 HC OXYGEN THERAPY PER DAY

## 2022-03-16 PROCEDURE — 83605 ASSAY OF LACTIC ACID: CPT

## 2022-03-16 PROCEDURE — 6370000000 HC RX 637 (ALT 250 FOR IP): Performed by: INTERNAL MEDICINE

## 2022-03-16 PROCEDURE — 82962 GLUCOSE BLOOD TEST: CPT

## 2022-03-16 PROCEDURE — 80076 HEPATIC FUNCTION PANEL: CPT

## 2022-03-16 PROCEDURE — 2060000000 HC ICU INTERMEDIATE R&B

## 2022-03-16 PROCEDURE — 97535 SELF CARE MNGMENT TRAINING: CPT

## 2022-03-16 PROCEDURE — 2580000003 HC RX 258: Performed by: INTERNAL MEDICINE

## 2022-03-16 PROCEDURE — 94640 AIRWAY INHALATION TREATMENT: CPT

## 2022-03-16 PROCEDURE — 85025 COMPLETE CBC W/AUTO DIFF WBC: CPT

## 2022-03-16 PROCEDURE — 83735 ASSAY OF MAGNESIUM: CPT

## 2022-03-16 PROCEDURE — 80048 BASIC METABOLIC PNL TOTAL CA: CPT

## 2022-03-16 PROCEDURE — 86140 C-REACTIVE PROTEIN: CPT

## 2022-03-16 RX ADMIN — METHYLPREDNISOLONE SODIUM SUCCINATE 40 MG: 40 INJECTION, POWDER, LYOPHILIZED, FOR SOLUTION INTRAMUSCULAR; INTRAVENOUS at 23:10

## 2022-03-16 RX ADMIN — Medication 1 CAPSULE: at 10:23

## 2022-03-16 RX ADMIN — ENOXAPARIN SODIUM 40 MG: 100 INJECTION SUBCUTANEOUS at 10:23

## 2022-03-16 RX ADMIN — ARFORMOTEROL TARTRATE 15 MCG: 15 SOLUTION RESPIRATORY (INHALATION) at 09:43

## 2022-03-16 RX ADMIN — METHYLPREDNISOLONE SODIUM SUCCINATE 40 MG: 40 INJECTION, POWDER, LYOPHILIZED, FOR SOLUTION INTRAMUSCULAR; INTRAVENOUS at 15:48

## 2022-03-16 RX ADMIN — DOXYCYCLINE 100 MG: 100 INJECTION, POWDER, LYOPHILIZED, FOR SOLUTION INTRAVENOUS at 11:40

## 2022-03-16 RX ADMIN — IPRATROPIUM BROMIDE AND ALBUTEROL SULFATE 3 ML: .5; 2.5 SOLUTION RESPIRATORY (INHALATION) at 09:43

## 2022-03-16 RX ADMIN — IPRATROPIUM BROMIDE AND ALBUTEROL SULFATE 3 ML: .5; 2.5 SOLUTION RESPIRATORY (INHALATION) at 19:52

## 2022-03-16 RX ADMIN — INSULIN LISPRO 1 UNITS: 100 INJECTION, SOLUTION INTRAVENOUS; SUBCUTANEOUS at 17:28

## 2022-03-16 RX ADMIN — FERROUS SULFATE TAB 325 MG (65 MG ELEMENTAL FE) 325 MG: 325 (65 FE) TAB at 10:22

## 2022-03-16 RX ADMIN — IPRATROPIUM BROMIDE AND ALBUTEROL SULFATE 3 ML: .5; 2.5 SOLUTION RESPIRATORY (INHALATION) at 15:45

## 2022-03-16 RX ADMIN — ARFORMOTEROL TARTRATE 15 MCG: 15 SOLUTION RESPIRATORY (INHALATION) at 19:52

## 2022-03-16 RX ADMIN — AZITHROMYCIN 500 MG: 250 TABLET, FILM COATED ORAL at 10:22

## 2022-03-16 RX ADMIN — PANTOPRAZOLE SODIUM 40 MG: 40 TABLET, DELAYED RELEASE ORAL at 06:47

## 2022-03-16 RX ADMIN — TORSEMIDE 10 MG: 20 TABLET ORAL at 10:22

## 2022-03-16 RX ADMIN — IPRATROPIUM BROMIDE AND ALBUTEROL SULFATE 3 ML: .5; 2.5 SOLUTION RESPIRATORY (INHALATION) at 12:43

## 2022-03-16 RX ADMIN — BUDESONIDE 1000 MCG: 0.25 SUSPENSION RESPIRATORY (INHALATION) at 19:52

## 2022-03-16 RX ADMIN — BUDESONIDE 1000 MCG: 0.25 SUSPENSION RESPIRATORY (INHALATION) at 09:43

## 2022-03-16 RX ADMIN — DOXYCYCLINE 100 MG: 100 INJECTION, POWDER, LYOPHILIZED, FOR SOLUTION INTRAVENOUS at 23:09

## 2022-03-16 RX ADMIN — FERROUS SULFATE TAB 325 MG (65 MG ELEMENTAL FE) 325 MG: 325 (65 FE) TAB at 18:23

## 2022-03-16 RX ADMIN — METHYLPREDNISOLONE SODIUM SUCCINATE 40 MG: 40 INJECTION, POWDER, LYOPHILIZED, FOR SOLUTION INTRAMUSCULAR; INTRAVENOUS at 06:46

## 2022-03-16 ASSESSMENT — PAIN SCALES - GENERAL
PAINLEVEL_OUTOF10: 0

## 2022-03-16 NOTE — PROGRESS NOTES
Occupational Therapy  OCCUPATIONAL THERAPY INITIAL EVALUATION  [facility]  [address], [facility], [place]    Date: 3/16/2022     Patient Name: Francisca Vora  MRN: [id number]  : 1950  Room: [address] [place] [address]    Evaluating OT: Prasad Jordan, OTR/L - [id number]    Referring Provider: Yoselin Lozano DO  Specific Provider Orders/Date: \"OT eval and treat\" - 3/14/2022    Diagnosis: Acute exacerbation of chronic obstructive pulmonary disease (COPD) ([facility] [address] [J44.1]      Pertinent Medical History: COPD, chronic back pain, COVID-19 (2021)    Precautions: fall risk, 6L O2 via nasal cannula     Assessment of Current Deficits:    [x] Functional mobility   [x]ADLs  [x] Strength               [x]Cognition   [x] Functional transfers   [x] IADLs         [x] Safety Awareness   [x]Endurance   [] Fine Coordination              [x] Balance      [] Vision/perception   [x]Sensation    []Gross Motor Coordination  [] ROM  [] Delirium                   [] Motor Control     OT PLAN OF CARE   OT POC is based on physician orders, patient diagnosis, and results of clinical assessment.   Frequency/Duration 2-5 days/week for 2 weeks PRN   Specific OT Treatment Interventions to Include:   * Instruction/training on adapted ADL techniques and AE recommendations to increase functional independence within precautions       * Training on energy conservation strategies, correct breathing pattern and techniques to improve independence/tolerance for self-care routine  * Functional transfer/mobility training/DME recommendations for increased independence, safety, and fall prevention  * Patient/Family education to increase follow through with safety techniques and functional independence  * Recommendation of environmental modifications for increased safety with functional transfers/mobility and ADLs  * Therapeutic exercise to improve motor endurance, ROM, and functional strength for ADLs/functional transfers  * Therapeutic activities to facilitate/challenge dynamic balance, stand tolerance for increased safety and independence with ADLs  * Neuro-muscular re-education: facilitation of righting/equilibrium reactions, midline orientation, scapular stability/mobility, normalization of muscle tone, and facilitation of volitional active controled movement    Recommended Adaptive Equipment: TBD     Home Living: Patient lives with his wife in a one-floor home; patient's bedroom and bathroom are on the main living level. Laundry is in the basement. Bathroom Setup: walk-in shower (with seat and handheld shower head available - no grab bars)  Equipment Owned: home O2     Prior Level of Function (PLOF): Patient noted that he is typically independent with ADLs; patient's wife completes most IADLs. Patient was independent with functional mobility (without device) prior to this hospitalization. Patient's wife can assist with ADLs/IADLs, as needed. Driving: Yes    Pain Level: Patient denied experiencing pain. Cognition: Patient alert and oriented x3. WFL command follow demonstrated. Patient pleasant, cooperative, and motivated to return to [facility] and home environment. Memory: WFL  Sequencing: WFL  Problem Solving: WFL  Judgement/Safety: WFL grossly    Functional Assessment:  AM-PAC Daily Activity Raw Score: 19/24   Initial Eval Status  Date: 3/16/2022 Treatment Status  Date:  Short Term Goals = Long Term Goals   Feeding Independent  N/A   Grooming Supervision for hand hygiene while standing at sink. Mod I / Independent  (seated/standing at sink)   UB Dressing Setup  Mod I / Independent  (including item retrieval)   LB Dressing SBA  Mod I / Independent - with use of AE, as needed/appropriate   Bathing SBA  Mod I / Independent - with use of AE/DME, as needed/appropriate   Toileting Supervision to stand at toilet to urinate.   Independent / Mod I    Bed Mobility  Supine-to-Sit: Independent     N/A   Functional Transfers Sit-to-Stand: Supervision  from EOB and bedside chair  Independent   Functional Mobility Supervision   (without device) within patient's room and bathroom. Independent with functional mobility (with device, as needed/appropriate) in order to maximize independence with ADLs/IADLs and other functional tasks. Balance Sitting: Good  (at EOB)  Standing: Good-  (without device)  Good dynamic standing balance during completion of ADLs/IADLs and other functional tasks. Activity Tolerance Limited secondary to shortness of breath. Patient's O2 saturation was 81% initially upon sitting down (after 3-5 minutes of dynamic standing activities during ADLs); patient's O2 saturation briefly decreased to 77% while seated before slowly improving to 92% (on 6L of O2 via nasal cannula) with provision of cues for proper breathing techniques and continued seated rest. Nursing notified. Patient will demonstrate Good understanding and consistent implementation of energy conservation techniques and work simplification techniques into ADL/IADL routines. Visual/  Perceptual WFL     N/A     Additional Long-Term Goal: Patient will increase functional independence to PLOF in order to allow patient to live in least restrictive environment. Strength: ROM: Additional Information:    R UE  WFL WFL    L UE WFL WFL      Hearing: WFL grossly  Sensation: No complaints of numbness/tingling in B UEs. Tone: WFL  Edema: No    Comments: RN approved patient's participation in [address] [place] [address] activities. Upon arrival, patient supine in bed. At end of session, patient seated in bedside chair with call light and phone within reach and all lines and tubes intact. Patient would benefit from continued skilled OT to increase safety and independence with completion of ADL/IADL tasks for functional independence and quality of life.      Treatment: OT treatment provided this date included:    Instruction/training on safety and adapted techniques for completion of ADLs.   Instruction/training on safe functional mobility/transfer techniques.   Instruction/training on energy conservation/work simplification techniques for implementation into ADL/IADL routines. Patient education provided regardin) potential benefits of DME to maximize safety with ADLs in home environment (e.g. showering). Patient verbalized understanding. Further skilled OT treatment indicated to increase patient's safety and independence with completion of ADL/IADL tasks in order to maximize patient's functional independence and quality of life. Rehab Potential: Good for established goals. Patient / Family Goal: Patient wants to be able to return to his PLOF and home environment. Patient and/or family were instructed on functional diagnosis, prognosis/goals, and OT plan of care. Demonstrated Good understanding. Eval Complexity: Low    Time In: 1455  Time Out: 1520  Total Treatment Time: 10 minutes      Minutes Units   OT Eval Low 09098 15 1   OT Eval Medium 61578     OT Eval High 45438     OT Re-Eval [id number]     Therapeutic Ex 85174     Therapeutic Activities 31610 10 1   ADL/Self Care 59218     Orthotic Management 93723     Neuro Re-Ed 24049     Non-Billable Time N/A ---     Evaluation time includes thorough review of current medical information, gathering information on past medical history/social history and prior level of function, completion of standardized testing/informal observation of tasks, assessment of data, and education on plan of care and goals. Vanessa Weeks OTR/L  License Number: [id number]

## 2022-03-16 NOTE — PROGRESS NOTES
Pulmonary Progress Note    Admit Date: 3/14/2022  Hospital day                               PCP: Sydney Baez MD    Chief Complaint (s):  Patient Active Problem List   Diagnosis    COPD [facility]chronic obstructive pulmonary disease) ([facility] [address]    Acute and chronic respiratory failure with hypoxia ([facility] [address]    Bullous emphysema ([facility] [address]    Thoracic ascending aortic aneurysm ([facility] [address]    S/P lumbar fusion    Hypophosphatemia    Glucose intolerance    Hilar adenopathy    Pulmonary Mycobacterium avium complex (MAC) infection (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 [address]    Acute heart failure with preserved ejection fraction (HCC)    Chronic respiratory failure with hypoxia ([facility] [address]    Erythrocytosis due to hypoxemia    Chronic pain syndrome    Lumbar degenerative disc disease    Chronic low back pain    Immunocompromised ([address]    Iron deficiency    Acute exacerbation of chronic obstructive pulmonary disease (COPD) ([facility] [address]       Subjective:  · Patient seen sitting in bed on 6 L. Still with complaints of dyspnea especially with exertion.        Vitals:  VITALS:  BP (!) 145/85   Pulse 85   Temp 98 °F (36.7 °C) (Oral)   Resp 18   Ht 6' 1\" (1.854 m)   Wt 193 lb (87.5 kg)   SpO2 94%   BMI 25.46 kg/m²     24HR INTAKE/OUTPUT:      Intake/Output Summary (Last 24 hours) at 3/16/2022 1730  Last data filed at 3/15/2022 2308  Gross per 24 hour   Intake 139.43 ml   Output --   Net 139.43 ml       24HR PULSE OXIMETRY RANGE:    SpO2  Av.8 %  Min: 92 %  Max: 94 %    Medications:  IV:   dextrose         Scheduled Meds:   doxycycline (VIBRAMYCIN) IV  100 mg IntraVENous Q12H    torsemide  10 mg Oral Once per day on     budesonide  1,000 mcg Nebulization BID    ipratropium-albuterol  3 mL Inhalation Q4H    azithromycin  500 mg Oral Once per day on     ferrous sulfate  325 mg Oral BID WC  lactobacillus  1 capsule Oral Daily    pantoprazole  40 mg Oral QAM AC    enoxaparin  40 mg SubCUTAneous Daily    Arformoterol Tartrate  15 mcg Nebulization BID    insulin lispro  0-6 Units SubCUTAneous TID WC    insulin lispro  0-3 Units SubCUTAneous Nightly    methylPREDNISolone  40 mg IntraVENous Q8H       Diet:   ADULT DIET; Regular     EXAM:  General: No distress. Alert. Eyes: PERRL. No sclera icterus. No conjunctival injection. ENT: No discharge. Pharynx clear. Neck: Trachea midline. Normal thyroid. Resp: No accessory muscle use. no rales. no wheezing. no rhonchi. Diminished. CV: Regular rate. Regular rhythm. No murmur or rub. Abd: Non-tender. Non-distended. No masses. No organomegaly. Normal bowel sounds. Skin: Warm and dry. No nodule on exposed extremities. No rash on exposed extremities. Ext: No cyanosis, clubbing, edema  Lymph: No cervical LAD. No supraclavicular LAD. M/S: No cyanosis. No joint deformity. No clubbing. Neuro: Awake. Follows commands. Positive pupils/gag/corneals. Normal pain response. Results:  CBC:   Recent Labs     03/14/22  1316 03/15/22  0348 03/16/22  0750   WBC 7.6 5.8 12.8*   HGB 16.2 14.9 15.7   HCT 48.6 44.4 48.2   MCV 91.5 89.5 93.8    256 258     BMP:   Recent Labs     03/14/22  1316 03/15/22  0348 03/16/22  0750    134 138   K 4.7 3.9 3.9    100 102   CO2 24 22 24   PHOS  --  2.6 3.1   BUN 9 9 13   CREATININE 0.8 0.7 0.6*     LIVER PROFILE:   Recent Labs     03/14/22  1316 03/15/22  0348 03/16/22  0750   AST 19 19 17   ALT 14 15 14   LIPASE 17  --   --    BILIDIR  --  <0.2 <0.2   BILITOT 0.6 0.4 0.3   ALKPHOS 69 67 76     PT/INR: No results for input(s): PROTIME, INR in the last 72 hours. APTT: No results for input(s): APTT in the last 72 hours. Pathology:  1. N/A      Microbiology:  1.  None    Recent ABG:   Recent Labs     03/14/22  1333   PH 7.458*   PO2 55.0*   PCO2 30.0*   HCO3 20.8*   BE -1.7   O2SAT 90.0*   METHB 0.3 O2HB 88.0*   COHB 1.9*   O2CON 19.9   HHB 9.8*   THB 16.1             Recent Films:  CT CHEST WO CONTRAST   Final Result   Advanced centrilobular emphysema with extensive scarring, fibrosis and   bullous changes. Resolution of the previously noted infiltrative mass in the left upper lobe   with the extensive  spiculated and infiltrative nodules and masses some of   which are new especially the 1 in the right middle lobe which are   indeterminate for developing malignancy. Close surveillance according to   Fleischner society guidelines is recommended. Borderline cardiac size with the tiny pericardial effusion and coronary   artery calcification. XR CHEST PORTABLE   Final Result   Worsening pneumonia at the right base and left mid lung. Other stable   findings as enumerated above. Assessment:  1. Severe bullous emphysema with associated hypoxia  2. New middle lobe nodules: inflammatory versus malignant    Plan:  1. Continue nebulizers at maximum dose  2. Check pulmonary functions  3. Supplemental oxygen. Wean to baseline of 4L when he is able to tolerate. Time at the bedside, reviewing labs and radiographs, reviewing updated notes and consultations, discussing with staff and family was more than 35 minutes. Please note that voice recognition technology was used in the preparation of this note and make therefore it may contain inadvertent transcription errors. If the patient is a COVID 19 isolation patient, the above physical exam reflects that of the examining physician for the day.         Ld Padron MD    [facility] [address] [facility], [address] [place] [address] [place], [address], [facility] [place]

## 2022-03-16 NOTE — PROGRESS NOTES
Pulmonary Progress Note    Admit Date: 3/14/2022  Hospital day                               PCP: Rossana Villela MD    Chief Complaint (s):  Patient Active Problem List   Diagnosis    COPD (chronic obstructive pulmonary disease) ([facility] [address]    Acute and chronic respiratory failure with hypoxia ([facility] [address]    Bullous emphysema ([facility] [address]    Thoracic ascending aortic aneurysm ([facility] [address]    S/P lumbar fusion    Hypophosphatemia    Glucose intolerance    Hilar adenopathy    Pulmonary Mycobacterium avium complex (MAC) infection ([facility] [address]    Acute on chronic respiratory failure with hypoxia (HCC)    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 Still with complaints of dyspnea especially with exertion.        Vitals:  VITALS:  BP (!) 145/85   Pulse 85   Temp 98 °F (36.7 °C) (Oral)   Resp 18   Ht 6' 1\" (1.854 m)   Wt 193 lb (87.5 kg)   SpO2 94%   BMI 25.46 kg/m²     24HR INTAKE/OUTPUT:      Intake/Output Summary (Last 24 hours) at 3/16/2022 1031  Last data filed at 3/15/2022 2308  Gross per 24 hour   Intake 139.43 ml   Output 300 ml   Net -160.57 ml       24HR PULSE OXIMETRY RANGE:    SpO2  Av.5 %  Min: 92 %  Max: 94 %    Medications:  IV:   dextrose         Scheduled Meds:   doxycycline (VIBRAMYCIN) IV  100 mg IntraVENous Q12H    torsemide  10 mg Oral Once per day on     budesonide  1,000 mcg Nebulization BID    ipratropium-albuterol  3 mL Inhalation Q4H    azithromycin  500 mg Oral Once per day on     ferrous sulfate  325 mg Oral BID WC    lactobacillus  1 capsule Oral Daily    pantoprazole  40 mg Oral QAM AC    enoxaparin  40 mg SubCUTAneous Daily    Arformoterol Tartrate  15 mcg Nebulization BID    insulin lispro  0-6 Units SubCUTAneous TID WC    insulin lispro  0-3 Units SubCUTAneous Nightly    methylPREDNISolone  40 mg IntraVENous Q8H       Diet:   ADULT DIET; Regular     EXAM:  General: No distress. Alert. Eyes: PERRL. No sclera icterus. No conjunctival injection. ENT: No discharge. Pharynx clear. Neck: Trachea midline. Normal thyroid. Resp: No accessory muscle use. no rales. no wheezing. no rhonchi. Diminished. CV: Regular rate. Regular rhythm. No murmur or rub. Abd: Non-tender. Non-distended. No masses. No organomegaly. Normal bowel sounds. Skin: Warm and dry. No nodule on exposed extremities. No rash on exposed extremities. Ext: No cyanosis, clubbing, edema  Lymph: No cervical LAD. No supraclavicular LAD. M/S: No cyanosis. No joint deformity. No clubbing. Neuro: Awake. Follows commands. Positive pupils/gag/corneals. Normal pain response. Results:  CBC:   Recent Labs     03/14/22  1316 03/15/22  0348 03/16/22  0750   WBC 7.6 5.8 12.8*   HGB 16.2 14.9 15.7   HCT 48.6 44.4 48.2   MCV 91.5 89.5 93.8    256 258     BMP:   Recent Labs     03/14/22  1316 03/15/22  0348 03/16/22  0750    134 138   K 4.7 3.9 3.9    100 102   CO2 24 22 24   PHOS  --  2.6 3.1   BUN 9 9 13   CREATININE 0.8 0.7 0.6*     LIVER PROFILE:   Recent Labs     03/14/22  1316 03/15/22  0348 03/16/22  0750   AST 19 19 17   ALT 14 15 14   LIPASE 17  --   --    BILIDIR  --  <0.2 <0.2   BILITOT 0.6 0.4 0.3   ALKPHOS 69 67 76     PT/INR: No results for input(s): PROTIME, INR in the last 72 hours. APTT: No results for input(s): APTT in the last 72 hours. Pathology:  1. N/A      Microbiology:  1.  None    Recent ABG:   Recent Labs     03/14/22  1333   PH 7.458*   PO2 55.0*   PCO2 30.0*   HCO3 20.8*   BE -1.7   O2SAT 90.0*   METHB 0. 3   O2HB 88.0*   COHB 1.9*   O2CON 19.9   HHB 9.8*   THB 16.1             Recent Films:  CT CHEST WO CONTRAST   Final Result   Advanced centrilobular emphysema with extensive scarring, fibrosis and   bullous changes. Resolution of the previously noted infiltrative mass in the left upper lobe   with the extensive  spiculated and infiltrative nodules and masses some of   which are new especially the 1 in the right middle lobe which are   indeterminate for developing malignancy. Close surveillance according to   Fleischner society guidelines is recommended. Borderline cardiac size with the tiny pericardial effusion and coronary   artery calcification. XR CHEST PORTABLE   Final Result   Worsening pneumonia at the right base and left mid lung. Other stable   findings as enumerated above. Assessment:  1. Severe bullous emphysema with associated hypoxia  2. New middle lobe nodules: inflammatory versus malignant    Plan:  1. Continue nebulizers at maximum dose  2. Supplemental oxygen. Wean to baseline of 4L when he is able to tolerate. Time at the bedside, reviewing labs and radiographs, reviewing updated notes and consultations, discussing with staff and family was more than 35 minutes. Please note that voice recognition technology was used in the preparation of this note and make therefore it may contain inadvertent transcription errors. If the patient is a COVID 19 isolation patient, the above physical exam reflects that of the examining physician for the day.         MIGUE Carmen - NP    [facility], [address] [facility], [address] [place] [address] Street, [facility]

## 2022-03-16 NOTE — CARE COORDINATION
Spoke to David Camara at [facility]- confirmed that patient is ordered 6 liters continuous oxygen at home. He also noted that patient has a home nebulizer through them. Attempted to meet with patient to discuss plan of care/discharge plan- pt undergoing nursing care and unavailable. 1200: Met with patient- discussed plan of care and discharge planning. Pt resides at home with his wife. Pt confirmed that discharge plan remains HOME. Pt denies any discharge needs.

## 2022-03-16 NOTE — PROGRESS NOTES
Subjective:    Chief complaint:    Still having significant issues with dyspnea  No other complaints otherwise  No overnight events  Objective:    BP (!) 145/85   Pulse 85   Temp 98 °F (36.7 °C) (Oral)   Resp 18   Ht 6' 1\" (1.854 m)   Wt 193 lb (87.5 kg)   SpO2 94%   BMI 25.46 kg/m²   General : Awake ,alert,no distress. Heart:  RRR, no murmurs, gallops, or rubs.   Lungs:  CTA bilaterally, no wheeze, rales or rhonchi  Abd: bowel sounds present, nontender, nondistended, no masses  Extrem:  No clubbing, cyanosis, or edema    CBC:   Lab Results   Component Value Date    WBC 12.8 03/16/2022    RBC 5.14 03/16/2022    HGB 15.7 03/16/2022    HCT 48.2 03/16/2022    MCV 93.8 03/16/2022    MCH 30.5 03/16/2022    MCHC 32.6 03/16/2022    RDW 15.7 03/16/2022     03/16/2022    MPV 9.6 03/16/2022     BMP:    Lab Results   Component Value Date     03/16/2022    K 3.9 03/16/2022    K 4.7 03/14/2022     03/16/2022    CO2 24 03/16/2022    BUN 13 03/16/2022    LABALBU 4.3 03/16/2022    CREATININE 0.6 03/16/2022    CALCIUM 10.2 03/16/2022    GFRAA >60 03/16/2022    LABGLOM >60 03/16/2022    GLUCOSE 129 03/16/2022     PT/INR:    Lab Results   Component Value Date    PROTIME 12.5 05/30/2019    INR 1.1 05/30/2019     Troponin:    Lab Results   Component Value Date    TROPONINI <0.01 04/08/2021       Recent Labs     03/14/22 2127   LABURIN Growth not present, incubation continues     Recent Labs     03/14/22  1755   BC 24 Hours no growth     Recent Labs     03/14/22  1815   BLOODCULT2 24 Hours no growth         Current Facility-Administered Medications:     doxycycline (VIBRAMYCIN) 100 mg in dextrose 5 % 100 mL IVPB, 100 mg, IntraVENous, Q12H, Jim Jaimes DO, Stopped at 03/16/22 1308    torsemide (DEMADEX) tablet 10 mg, 10 mg, Oral, Once per day on Mon Wed Fri, Tre Bravo DO, 10 mg at 03/16/22 1022    budesonide (PULMICORT) nebulizer suspension 1,000 mcg, 1,000 mcg, Nebulization, BID, Silvina Barker, MD, 1,000 mcg at 03/16/22 0943    ipratropium-albuterol (DUONEB) nebulizer solution 3 mL, 3 mL, Inhalation, Q4H, Bianca Ge MD, 3 mL at 03/16/22 1243    azithromycin [facility]) tablet 500 mg, 500 mg, Oral, Once per day on Mon Wed Fri, Jim A Mihok, DO, 500 mg at 03/16/22 1022    ferrous sulfate (IRON 325) tablet 325 mg, 325 mg, Oral, BID WC, Jim A Mihok, DO, 325 mg at 03/16/22 1022    lactobacillus (CULTURELLE) capsule 1 capsule, 1 capsule, Oral, Daily, Jim A Mihok, DO, 1 capsule at 03/16/22 1023    pantoprazole (PROTONIX) tablet 40 mg, 40 mg, Oral, QAM AC, Jim A Mihok, DO, 40 mg at 03/16/22 0647    enoxaparin (LOVENOX) injection 40 mg, 40 mg, SubCUTAneous, Daily, Jim A Mihok, DO, 40 mg at 03/16/22 1023    Arformoterol Tartrate (BROVANA) nebulizer solution 15 mcg, 15 mcg, Nebulization, BID, Anthony Chidi Mihok, DO, 15 mcg at 03/16/22 0943    potassium chloride (KLOR-CON M) extended release tablet 40 mEq, 40 mEq, Oral, PRN **OR** potassium bicarb-citric acid (EFFER-K) effervescent tablet 40 mEq, 40 mEq, Oral, PRN **OR** potassium chloride 10 mEq/100 mL IVPB (Peripheral Line), 10 mEq, IntraVENous, PRN, Anthony Chidi Mihok, DO    glucose (GLUTOSE) 40 % oral gel 15 g, 15 g, Oral, PRN, Anthony Chidi Mihok, DO    glucagon (rDNA) injection 1 mg, 1 mg, IntraMUSCular, PRN, Anthony Chidi Mihok, DO    dextrose 5 % solution, 100 mL/hr, IntraVENous, PRN, Anthony Chidi Mihok, DO    insulin lispro (HUMALOG) injection vial 0-6 Units, 0-6 Units, SubCUTAneous, TID WC, Jim A Mihok, DO    insulin lispro (HUMALOG) injection vial 0-3 Units, 0-3 Units, SubCUTAneous, Nightly, Jim Jaimes DO, 1 Units at 03/15/22 2236    acetaminophen (TYLENOL) tablet 650 mg, 650 mg, Oral, Q4H PRN, Anthony Jaimes DO    methylPREDNISolone sodium (SOLU-MEDROL) injection 40 mg, 40 mg, IntraVENous, Q8H, Jim Jaimes DO, 40 mg at 03/16/22 0646    oxyCODONE-acetaminophen (PERCOCET) 5-325 MG per tablet 1 tablet, 1 tablet, Oral, BID PRN **AND** oxyCODONE (ROXICODONE) immediate release tablet 2.5 mg, 2.5 mg, Oral, BID PRN, Adan Bachk, DO    dextrose bolus (hypoglycemia) 10% 125 mL, 125 mL, IntraVENous, PRN **OR** dextrose bolus (hypoglycemia) 10% 250 mL, 250 mL, IntraVENous, PRN, Adan Moraleshok, DO    ADULT DIET; Regular    CT CHEST WO CONTRAST   Final Result   Advanced centrilobular emphysema with extensive scarring, fibrosis and   bullous changes. Resolution of the previously noted infiltrative mass in the left upper lobe   with the extensive  spiculated and infiltrative nodules and masses some of   which are new especially the 1 in the right middle lobe which are   indeterminate for developing malignancy. Close surveillance according to   Fleischner society guidelines is recommended. Borderline cardiac size with the tiny pericardial effusion and coronary   artery calcification. XR CHEST PORTABLE   Final Result   Worsening pneumonia at the right base and left mid lung. Other stable   findings as enumerated above. 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  *      Plan:    CT chest results noted  Advanced COPD changes  Resolution of left upper lobe infiltrative process however there is  Extensive spiculated infiltrative nodules and masses in right middle lobe question of malignancy  Currently on IV steroids  Echocardiogram results noted showing preserved ejection fraction, no diastolic dysfunction, moderately dilated right ventricle  Continue supportive care for now  Wean oxygen as able  Patient's usual baseline oxygen is 3 L/min      Mary Ellen House MD  3:29 PM  3/16/2022    NOTE: This report was transcribed using voice recognition software.  Every effort was made to ensure accuracy; however, inadvertent transcription errors may be present

## 2022-03-17 LAB
ALBUMIN SERPL-MCNC: 4 G/DL (ref 3.5–5.2)
ALP BLD-CCNC: 73 U/L (ref 40–129)
ALT SERPL-CCNC: 13 U/L (ref 0–40)
ANION GAP SERPL CALCULATED.3IONS-SCNC: 14 MMOL/L (ref 7–16)
AST SERPL-CCNC: 13 U/L (ref 0–39)
BASOPHILS ABSOLUTE: 0.01 E9/L (ref 0–0.2)
BASOPHILS RELATIVE PERCENT: 0.1 % (ref 0–2)
BILIRUB SERPL-MCNC: 0.2 MG/DL (ref 0–1.2)
BILIRUBIN DIRECT: <0.2 MG/DL (ref 0–0.3)
BILIRUBIN, INDIRECT: NORMAL MG/DL (ref 0–1)
BUN BLDV-MCNC: 17 MG/DL (ref 6–23)
C-REACTIVE PROTEIN: 0.3 MG/DL (ref 0–0.4)
CALCIUM SERPL-MCNC: 10.1 MG/DL (ref 8.6–10.2)
CHLORIDE BLD-SCNC: 99 MMOL/L (ref 98–107)
CO2: 24 MMOL/L (ref 22–29)
CREAT SERPL-MCNC: 0.7 MG/DL (ref 0.7–1.2)
EOSINOPHILS ABSOLUTE: 0 E9/L (ref 0.05–0.5)
EOSINOPHILS RELATIVE PERCENT: 0 % (ref 0–6)
GFR AFRICAN AMERICAN: >60
GFR NON-AFRICAN AMERICAN: >60 ML/MIN/1.73
GLUCOSE BLD-MCNC: 130 MG/DL (ref 74–99)
HCT VFR BLD CALC: 48.3 % (ref 37–54)
HEMOGLOBIN: 15.8 G/DL (ref 12.5–16.5)
IMMATURE GRANULOCYTES #: 0.07 E9/L
IMMATURE GRANULOCYTES %: 0.6 % (ref 0–5)
LACTIC ACID, SEPSIS: 2.6 MMOL/L (ref 0.5–1.9)
LYMPHOCYTES ABSOLUTE: 0.31 E9/L (ref 1.5–4)
LYMPHOCYTES RELATIVE PERCENT: 2.7 % (ref 20–42)
MAGNESIUM: 2.1 MG/DL (ref 1.6–2.6)
MCH RBC QN AUTO: 30 PG (ref 26–35)
MCHC RBC AUTO-ENTMCNC: 32.7 % (ref 32–34.5)
MCV RBC AUTO: 91.8 FL (ref 80–99.9)
METER GLUCOSE: 112 MG/DL (ref 74–99)
METER GLUCOSE: 122 MG/DL (ref 74–99)
METER GLUCOSE: 138 MG/DL (ref 74–99)
METER GLUCOSE: 92 MG/DL (ref 74–99)
MONOCYTES ABSOLUTE: 0.16 E9/L (ref 0.1–0.95)
MONOCYTES RELATIVE PERCENT: 1.4 % (ref 2–12)
NEUTROPHILS ABSOLUTE: 10.99 E9/L (ref 1.8–7.3)
NEUTROPHILS RELATIVE PERCENT: 95.2 % (ref 43–80)
PDW BLD-RTO: 15.5 FL (ref 11.5–15)
PHOSPHORUS: 3.3 MG/DL (ref 2.5–4.5)
PLATELET # BLD: 280 E9/L (ref 130–450)
PMV BLD AUTO: 9.4 FL (ref 7–12)
POTASSIUM SERPL-SCNC: 4.4 MMOL/L (ref 3.5–5)
RBC # BLD: 5.26 E12/L (ref 3.8–5.8)
RBC # BLD: NORMAL 10*6/UL
SEDIMENTATION RATE, ERYTHROCYTE: 3 MM/HR (ref 0–15)
SODIUM BLD-SCNC: 137 MMOL/L (ref 132–146)
TOTAL PROTEIN: 6.7 G/DL (ref 6.4–8.3)
URINE CULTURE, ROUTINE: NORMAL
WBC # BLD: 11.5 E9/L (ref 4.5–11.5)

## 2022-03-17 PROCEDURE — 86140 C-REACTIVE PROTEIN: CPT

## 2022-03-17 PROCEDURE — 94726 PLETHYSMOGRAPHY LUNG VOLUMES: CPT

## 2022-03-17 PROCEDURE — 36415 COLL VENOUS BLD VENIPUNCTURE: CPT

## 2022-03-17 PROCEDURE — 94729 DIFFUSING CAPACITY: CPT

## 2022-03-17 PROCEDURE — 85025 COMPLETE CBC W/AUTO DIFF WBC: CPT

## 2022-03-17 PROCEDURE — 80076 HEPATIC FUNCTION PANEL: CPT

## 2022-03-17 PROCEDURE — 6370000000 HC RX 637 (ALT 250 FOR IP): Performed by: INTERNAL MEDICINE

## 2022-03-17 PROCEDURE — 80048 BASIC METABOLIC PNL TOTAL CA: CPT

## 2022-03-17 PROCEDURE — 83605 ASSAY OF LACTIC ACID: CPT

## 2022-03-17 PROCEDURE — 6360000002 HC RX W HCPCS

## 2022-03-17 PROCEDURE — 83735 ASSAY OF MAGNESIUM: CPT

## 2022-03-17 PROCEDURE — 94640 AIRWAY INHALATION TREATMENT: CPT

## 2022-03-17 PROCEDURE — 6360000002 HC RX W HCPCS: Performed by: INTERNAL MEDICINE

## 2022-03-17 PROCEDURE — 85651 RBC SED RATE NONAUTOMATED: CPT

## 2022-03-17 PROCEDURE — 2700000000 HC OXYGEN THERAPY PER DAY

## 2022-03-17 PROCEDURE — 82962 GLUCOSE BLOOD TEST: CPT

## 2022-03-17 PROCEDURE — 84100 ASSAY OF PHOSPHORUS: CPT

## 2022-03-17 PROCEDURE — 2060000000 HC ICU INTERMEDIATE R&B

## 2022-03-17 PROCEDURE — 94060 EVALUATION OF WHEEZING: CPT

## 2022-03-17 RX ORDER — DOXYCYCLINE HYCLATE 100 MG/1
100 CAPSULE ORAL EVERY 12 HOURS SCHEDULED
Status: DISCONTINUED | OUTPATIENT
Start: 2022-03-17 | End: 2022-03-18 | Stop reason: HOSPADM

## 2022-03-17 RX ORDER — METHYLPREDNISOLONE SODIUM SUCCINATE 40 MG/ML
40 INJECTION, POWDER, LYOPHILIZED, FOR SOLUTION INTRAMUSCULAR; INTRAVENOUS EVERY 12 HOURS
Status: DISCONTINUED | OUTPATIENT
Start: 2022-03-17 | End: 2022-03-18

## 2022-03-17 RX ADMIN — IPRATROPIUM BROMIDE AND ALBUTEROL SULFATE 3 ML: .5; 2.5 SOLUTION RESPIRATORY (INHALATION) at 20:01

## 2022-03-17 RX ADMIN — IPRATROPIUM BROMIDE AND ALBUTEROL SULFATE 3 ML: .5; 2.5 SOLUTION RESPIRATORY (INHALATION) at 15:56

## 2022-03-17 RX ADMIN — Medication 1 CAPSULE: at 08:54

## 2022-03-17 RX ADMIN — ARFORMOTEROL TARTRATE 15 MCG: 15 SOLUTION RESPIRATORY (INHALATION) at 20:02

## 2022-03-17 RX ADMIN — ACETAMINOPHEN 650 MG: 325 TABLET ORAL at 11:03

## 2022-03-17 RX ADMIN — FERROUS SULFATE TAB 325 MG (65 MG ELEMENTAL FE) 325 MG: 325 (65 FE) TAB at 17:37

## 2022-03-17 RX ADMIN — ENOXAPARIN SODIUM 40 MG: 100 INJECTION SUBCUTANEOUS at 08:54

## 2022-03-17 RX ADMIN — DOXYCYCLINE HYCLATE 100 MG: 100 CAPSULE ORAL at 08:57

## 2022-03-17 RX ADMIN — FERROUS SULFATE TAB 325 MG (65 MG ELEMENTAL FE) 325 MG: 325 (65 FE) TAB at 08:54

## 2022-03-17 RX ADMIN — BUDESONIDE 1000 MCG: 0.25 SUSPENSION RESPIRATORY (INHALATION) at 20:02

## 2022-03-17 RX ADMIN — DOXYCYCLINE HYCLATE 100 MG: 100 CAPSULE ORAL at 21:08

## 2022-03-17 RX ADMIN — IPRATROPIUM BROMIDE AND ALBUTEROL SULFATE 3 ML: .5; 2.5 SOLUTION RESPIRATORY (INHALATION) at 11:52

## 2022-03-17 RX ADMIN — METHYLPREDNISOLONE SODIUM SUCCINATE 40 MG: 40 INJECTION, POWDER, LYOPHILIZED, FOR SOLUTION INTRAMUSCULAR; INTRAVENOUS at 05:44

## 2022-03-17 RX ADMIN — PANTOPRAZOLE SODIUM 40 MG: 40 TABLET, DELAYED RELEASE ORAL at 05:44

## 2022-03-17 RX ADMIN — METHYLPREDNISOLONE SODIUM SUCCINATE 40 MG: 40 INJECTION, POWDER, LYOPHILIZED, FOR SOLUTION INTRAMUSCULAR; INTRAVENOUS at 17:37

## 2022-03-17 ASSESSMENT — PAIN SCALES - GENERAL
PAINLEVEL_OUTOF10: 3
PAINLEVEL_OUTOF10: 0

## 2022-03-17 ASSESSMENT — PAIN DESCRIPTION - LOCATION: LOCATION: HEAD

## 2022-03-17 ASSESSMENT — PAIN DESCRIPTION - PAIN TYPE: TYPE: ACUTE PAIN

## 2022-03-17 NOTE — PROGRESS NOTES
Subjective:    Chief complaint:    Patient feels breathing is slowly improving  He does not have any new issues  No overnight events  Objective:    /82   Pulse 78   Temp 97.6 °F (36.4 °C) (Oral)   Resp 18   Ht 6' 1\" (1.854 m)   Wt 193 lb (87.5 kg)   SpO2 92%   BMI 25.46 kg/m²   General : Awake ,alert,no distress. Heart:  RRR, no murmurs, gallops, or rubs.   Lungs:  CTA bilaterally, no wheeze, rales or rhonchi  Abd: bowel sounds present, nontender, nondistended, no masses  Extrem:  No clubbing, cyanosis, or edema    CBC:   Lab Results   Component Value Date    WBC 11.5 03/17/2022    RBC 5.26 03/17/2022    HGB 15.8 03/17/2022    HCT 48.3 03/17/2022    MCV 91.8 03/17/2022    MCH 30.0 03/17/2022    MCHC 32.7 03/17/2022    RDW 15.5 03/17/2022     03/17/2022    MPV 9.4 03/17/2022     BMP:    Lab Results   Component Value Date     03/17/2022    K 4.4 03/17/2022    K 4.7 03/14/2022    CL 99 03/17/2022    CO2 24 03/17/2022    BUN 17 03/17/2022    LABALBU 4.0 03/17/2022    CREATININE 0.7 03/17/2022    CALCIUM 10.1 03/17/2022    GFRAA >60 03/17/2022    LABGLOM >60 03/17/2022    GLUCOSE 130 03/17/2022     PT/INR:    Lab Results   Component Value Date    PROTIME 12.5 05/30/2019    INR 1.1 05/30/2019     Troponin:    Lab Results   Component Value Date    TROPONINI <0.01 04/08/2021       Recent Labs     03/14/22 2127   LABURIN Growth not present     Recent Labs     03/14/22  1755   BC 24 Hours no growth     Recent Labs     03/14/22  1815   BLOODCULT2 24 Hours no growth         Current Facility-Administered Medications:     doxycycline hyclate (VIBRAMYCIN) capsule 100 mg, 100 mg, Oral, 2 times per day, Synetta Camera DO Fiona, 100 mg at 03/17/22 0857    methylPREDNISolone sodium (SOLU-MEDROL) injection 40 mg, 40 mg, IntraVENous, Q12H, MIGUE Kruger - NP    torsemide [facility]) tablet 10 mg, 10 mg, Oral, Once per day on Mon Wed Fri, Tre Bravo DO, 10 mg at 03/16/22 1022    budesonide (PULMICORT) nebulizer suspension 1,000 mcg, 1,000 mcg, Nebulization, BID, Clearance MD Margareth, 1,000 mcg at 03/1950    ipratropium-albuterol (Phylliss Hals) nebulizer solution 3 mL, 3 mL, Inhalation, Q4H, Clearance MD Margareth, 3 mL at 03/17/22 1152    azithromycin [facility]) tablet 500 mg, 500 mg, Oral, Once per day on Mon Wed Fri, Jim Jaimes, DO, 500 mg at 03/16/22 1022    ferrous sulfate (IRON 325) tablet 325 mg, 325 mg, Oral, BID WC, Jim Jaimes, DO, 325 mg at 03/17/22 0854    lactobacillus (CULTURELLE) capsule 1 capsule, 1 capsule, Oral, Daily, Zeeshan Jaimes, DO, 1 capsule at 03/17/22 0854    pantoprazole (PROTONIX) tablet 40 mg, 40 mg, Oral, QAM , Jim Jaimes DO, 40 mg at 03/17/22 0544    enoxaparin (LOVENOX) injection 40 mg, 40 mg, SubCUTAneous, Daily, Zeeshan Jaimes, DO, 40 mg at 03/17/22 0854    Arformoterol Tartrate (BROVANA) nebulizer solution 15 mcg, 15 mcg, Nebulization, BID, Zeeshan Jaimes, DO, 15 mcg at 03/1950    potassium chloride (KLOR-CON M) extended release tablet 40 mEq, 40 mEq, Oral, PRN **OR** potassium bicarb-citric acid (EFFER-K) effervescent tablet 40 mEq, 40 mEq, Oral, PRN **OR** potassium chloride 10 mEq/100 mL IVPB (Peripheral Line), 10 mEq, IntraVENous, PRN, Zeeshan Moraleshok, DO    glucose (GLUTOSE) 40 % oral gel 15 g, 15 g, Oral, PRN, Zeeshan Jaimes, DO    glucagon (rDNA) injection 1 mg, 1 mg, IntraMUSCular, PRN, Zeeshan Jaimes, DO    dextrose 5 % solution, 100 mL/hr, IntraVENous, PRN, Zeeshan Bachk, DO    insulin lispro (HUMALOG) injection vial 0-6 Units, 0-6 Units, SubCUTAneous, TID , Jim Jaimes DO, 1 Units at 03/16/22 1728    insulin lispro (HUMALOG) injection vial 0-3 Units, 0-3 Units, SubCUTAneous, Nightly, Jim Jaimes DO, 1 Units at 03/15/22 2236    acetaminophen (TYLENOL) tablet 650 mg, 650 mg, Oral, Q4H PRN, Zeeshan Jaimes DO, 650 mg at 03/17/22 1103    oxyCODONE-acetaminophen (PERCOCET) 5-325 MG per tablet 1 tablet, 1 tablet, Oral, BID PRN **AND** oxyCODONE (ROXICODONE) immediate release tablet 2.5 mg, 2.5 mg, Oral, BID PRN, July Him Mihok, DO    dextrose bolus (hypoglycemia) 10% 125 mL, 125 mL, IntraVENous, PRN **OR** dextrose bolus (hypoglycemia) 10% 250 mL, 250 mL, IntraVENous, PRN, July Him Mihok, DO    ADULT DIET; Regular    CT CHEST WO CONTRAST   Final Result   Advanced centrilobular emphysema with extensive scarring, fibrosis and   bullous changes. Resolution of the previously noted infiltrative mass in the left upper lobe   with the extensive  spiculated and infiltrative nodules and masses some of   which are new especially the 1 in the right middle lobe which are   indeterminate for developing malignancy. Close surveillance according to   Fleischner society guidelines is recommended. Borderline cardiac size with the tiny pericardial effusion and coronary   artery calcification. XR CHEST PORTABLE   Final Result   Worsening pneumonia at the right base and left mid lung. Other stable   findings as enumerated above. 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  *      Plan:    CT chest results noted  Advanced COPD changes  Resolution of left upper lobe infiltrative process however there is  Extensive spiculated infiltrative nodules and masses in right middle lobe question of malignancy  Currently on IV steroids  Echocardiogram results noted showing preserved ejection fraction, no diastolic dysfunction, moderately dilated right ventricle  Continue supportive care for now  Wean oxygen as able  Currently on 5 L of oxygen  Clinical PFTs  Cardiology input noted      Jan Austin MD  1:06 PM  3/17/2022    NOTE: This report was transcribed using voice recognition software.  Every effort was made to ensure accuracy; however, inadvertent transcription errors may be present

## 2022-03-17 NOTE — PROGRESS NOTES
Pulmonary Progress Note    Admit Date: 3/14/2022  Hospital day                               PCP: Waleska Sorto MD    Chief Complaint (s):  Patient Active Problem List   Diagnosis    [facility]) [facility]    Acute and chronic respiratory failure with hypoxia ([address]    Bullous emphysema ([address]    Thoracic ascending aortic aneurysm ([address]    S/P lumbar fusion    Hypophosphatemia    Glucose intolerance    Hilar adenopathy    Pulmonary Mycobacterium avium complex (MAC) infection ([address]    Acute on chronic respiratory failure with hypoxia (HCC)    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 Complaints of dyspnea especially with exertion. Sher Cos functions are reviewed.       Vitals:  VITALS:  /82   Pulse 78   Temp 97.6 °F (36.4 °C) (Oral)   Resp 18   Ht 6' 1\" (1.854 m)   Wt 193 lb (87.5 kg)   SpO2 92%   BMI 25.46 kg/m²     24HR INTAKE/OUTPUT:      Intake/Output Summary (Last 24 hours) at 3/17/2022 0957  Last data filed at 3/16/2022 2326  Gross per 24 hour   Intake 185.32 ml   Output --   Net 185.32 ml       24HR PULSE OXIMETRY RANGE:    SpO2  Av.7 %  Min: 90 %  Max: 96 %    Medications:  IV:   dextrose         Scheduled Meds:   doxycycline  100 mg Oral 2 times per day    torsemide  10 mg Oral Once per day on     budesonide  1,000 mcg Nebulization BID    ipratropium-albuterol  3 mL Inhalation Q4H    azithromycin  500 mg Oral Once per day on     ferrous sulfate  325 mg Oral BID WC    lactobacillus  1 capsule Oral Daily    pantoprazole  40 mg Oral QAM AC    enoxaparin  40 mg SubCUTAneous Daily    Arformoterol Tartrate  15 mcg Nebulization BID    insulin lispro  0-6 Units SubCUTAneous TID WC    insulin lispro  0-3 Units SubCUTAneous Nightly    methylPREDNISolone  40 mg IntraVENous Q8H       Diet:   ADULT DIET; Regular     EXAM:  General: No distress. Alert. Eyes: PERRL. No sclera icterus. No conjunctival injection. ENT: No discharge. Pharynx clear. Neck: Trachea midline. Normal thyroid. Resp: No accessory muscle use. no rales. no wheezing. no rhonchi. Diminished. CV: Regular rate. Regular rhythm. No murmur or rub. Abd: Non-tender. Non-distended. No masses. No organomegaly. Normal bowel sounds. Skin: Warm and dry. No nodule on exposed extremities. No rash on exposed extremities. Ext: No cyanosis, clubbing, edema  Lymph: No cervical LAD. No supraclavicular LAD. M/S: No cyanosis. No joint deformity. No clubbing. Neuro: Awake. Follows commands. Positive pupils/gag/corneals. Normal pain response. Results:  CBC:   Recent Labs     03/15/22  0348 03/16/22  0750 03/17/22  0319   WBC 5.8 12.8* 11.5   HGB 14.9 15.7 15.8   HCT 44.4 48.2 48.3   MCV 89.5 93.8 91.8    258 280     BMP:   Recent Labs     03/15/22  0348 03/16/22 0750 03/17/22 0319    138 137   K 3.9 3.9 4.4    102 99   CO2 22 24 24   PHOS 2.6 3.1 3.3   BUN 9 13 17   CREATININE 0.7 0.6* 0.7     LIVER PROFILE:   Recent Labs     03/14/22  1316 03/14/22  1316 03/15/22  0348 03/16/22  0750 03/17/22  0319   AST 19   < > 19 17 13   ALT 14   < > 15 14 13   LIPASE 17  --   --   --   --    BILIDIR  --   --  <0.2 <0.2 <0.2   BILITOT 0.6   < > 0.4 0.3 0.2   ALKPHOS 69   < > 67 76 73    < > = values in this interval not displayed. PT/INR: No results for input(s): PROTIME, INR in the last 72 hours. APTT: No results for input(s):  APTT in the last 72 hours. Pathology:  1. N/A      Microbiology:  1. None    Recent ABG:   Recent Labs     03/14/22  1333   PH 7.458*   PO2 55.0*   PCO2 30.0*   HCO3 20.8*   BE -1.7   O2SAT 90.0*   METHB 0.3   O2HB 88.0*   COHB 1.9*   O2CON 19.9   HHB 9.8*   THB 16.1             Recent Films:  CT CHEST WO CONTRAST   Final Result   Advanced centrilobular emphysema with extensive scarring, fibrosis and   bullous changes. Resolution of the previously noted infiltrative mass in the left upper lobe   with the extensive  spiculated and infiltrative nodules and masses some of   which are new especially the 1 in the right middle lobe which are   indeterminate for developing malignancy. Close surveillance according to   Fleischner society guidelines is recommended. Borderline cardiac size with the tiny pericardial effusion and coronary   artery calcification. XR CHEST PORTABLE   Final Result   Worsening pneumonia at the right base and left mid lung. Other stable   findings as enumerated above. Assessment:  1. Severe bullous emphysema with associated hypoxia. Pulmonary functions evidence a normal FVC, a reduced FEV1 and a moderately reduced FEV1 to FVC ratio. Diffusing capacity is strikingly relative low at 12% of predicted. 2. New middle lobe nodules: inflammatory versus malignant    Plan:  1. Wean steroids to IV solumedrol 40mg Q12H.  2. Continue nebulizers at maximum dose  3. Check pulmonary functions. 4. Supplemental oxygen. Wean to baseline of 4L when he is able to tolerate. 5. Reviewed CT scan to see if the patient has a disease that would be amenable to endobronchial valve placement. Unfortunately, the bullous disease extends from the apices to the bases without substantial regional difference. see above. Time at the bedside, reviewing labs and radiographs, reviewing updated notes and consultations, discussing with staff and family was more than 35 minutes.     Please note that voice recognition technology was used in the preparation of this note and make therefore it may contain inadvertent transcription errors. If the patient is a COVID 19 isolation patient, the above physical exam reflects that of the examining physician for the day.         MIGUE Barragan - NP    [facility] [address] [facility], [address] [place] [address], [address] [place] [address]

## 2022-03-17 NOTE — PROGRESS NOTES
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 *      SUBJECTIVE:  York Digna states he is feeling better. He is sitting up in bed with mild tachypnea. He remains in good spirits. He denies chest discomfort, n/v, diaphoresis, peripheral edema. REVIEW OF SYSTEMS:  General ROS: negative for - fatigue, malaise,  weight gain or weight loss  Psychological ROS: negative for - anxiety , depression  Ophthalmic ROS: negative for - decreased vision or visual distortion. ENT ROS: negative  Allergy and Immunology ROS: negative  Hematological and Lymphatic ROS: negative  Endocrine: no heat or cold intolerance and no polyphagia, polydipsia, or polyuria  Respiratory ROS: positive for - shortness of breath  Cardiovascular ROS: no chest pain, edema, palpitations and rapid heart rate. Gastrointestinal ROS: no abdominal pain, change in bowel habits, or black or bloody stools  Genito-Urinary ROS: no nocturia, dysuria, trouble voiding, frequency or hematuria  Musculoskeletal ROS: negative for- myalgias, arthralgias, or claudication  Neurological ROS: no TIA or stroke symptoms otherwise no significant change in symptoms or problems since yesterday as documented in previous progress notes.     SCHEDULED MEDICATIONS:   doxycycline  100 mg Oral 2 times per day    methylPREDNISolone  40 mg IntraVENous Q12H    torsemide  10 mg Oral Once per day on Mon Wed Fri    budesonide  1,000 mcg Nebulization BID    ipratropium-albuterol  3 mL Inhalation Q4H    azithromycin  500 mg Oral Once per day on Mon Wed Fri    ferrous sulfate  325 mg Oral BID WC    lactobacillus  1 capsule Oral Daily    pantoprazole  40 mg Oral QAM AC    enoxaparin  40 mg SubCUTAneous Daily    Arformoterol Tartrate  15 mcg Nebulization BID    insulin lispro  0-6 Units SubCUTAneous TID WC    insulin lispro  0-3 Units SubCUTAneous Nightly       VITAL SIGNS:                                                                                                                          /82   Pulse 78   Temp 97.6 °F (36.4 °C) (Oral)   Resp 18   Ht 6' 1\" (1.854 m)   Wt 193 lb (87.5 kg)   SpO2 92%   BMI 25.46 kg/m²   Patient Vitals for the past 96 hrs (Last 3 readings):   Weight   03/14/22 1257 193 lb (87.5 kg)     OBJECTIVE:    HEENT: PERRL, EOM  Intact; sclera non-icteric, conjunctiva pink. Carotids are brisk in upstroke with normal contour. No carotid bruits. Normal jugular venous pulsation at 45°. No palpable cervical nor supraclavicular nodes. Thyroid not palpable. Trachea midline. Chest: Even excursion  Lungs: Mildly tachypnic. CTA B, no expiratory wheezes or rhonchi, no decreased tactile fremitus without inspiratory rales. Heart: Regular  rhythm; S1 > S2, no gallop or murmur. No clicks, rub, palpable thrills   or heaves. PMI nondisplaced, 5th intercostal space MCL. Abdomen: Soft, nontender, nondistended,  scaphoid, no masses or organomegaly. Bowel sounds active. Extremities: Without clubbing, cyanosis or edema. Pulses present 3+ upper extermities bilaterally; present 2+ DP and present 2+ PT bilaterally.      Data:   Scheduled Meds: Reviewed  Continuous Infusions:    dextrose         Intake/Output Summary (Last 24 hours) at 3/17/2022 1302  Last data filed at 3/17/2022 0909  Gross per 24 hour   Intake 385.32 ml   Output    Net 385.32 ml     CBC:   Recent Labs     03/14/22  1316 03/15/22  0348 03/16/22  0750 03/17/22  0319   WBC 7.6 5.8 12.8* 11.5   HGB 16.2 14.9 15.7 15.8   HCT 48.6 44.4 48.2 48.3    256 258 280     BMP:  Recent Labs     03/14/22  1316 03/14/22  1316 03/15/22  0348 03/15/22  0348 03/16/22  0750 03/16/22  0750 03/17/22  0319     --  134  --  138  --  137   K 4.7  --  3.9  --  3.9  --  4.4     --  100  --  102  --  99   CO2 24  --  22  --  24  --  24   BUN 9  --  9  --  13  --  17   CREATININE 0.8  --  0.7  --  0.6*  --  0.7   LABGLOM >60   < > >60   < > >60   < > >60    < > = values in this interval not displayed. ABGs:   Lab Results   Component Value Date    PH 7.458 03/14/2022    PO2 55.0 03/14/2022    PCO2 30.0 03/14/2022     INR: No results for input(s): INR in the last 72 hours.   PRO-BNP:   Lab Results   Component Value Date    PROBNP 1,632 (H) 03/15/2022    PROBNP 2,696 (H) 03/14/2022      TSH:   Lab Results   Component Value Date    TSH 0.455 03/15/2022      Cardiac Injury Profile:   Recent Labs     03/14/22  1316 03/14/22  1805 03/15/22  0348   TROPHS 8 6 7      Lipid Profile:   Lab Results   Component Value Date    TRIG 47 03/15/2022    HDL 62 03/15/2022    LDLCALC 74 03/15/2022    CHOL 145 03/15/2022      Hemoglobin A1C: No components found for: HGBA1C      RAD:   Echo Complete    Result Date: 3/15/2022  Transthoracic Echocardiography Report (TTE)  Demographics   Patient Name        Doc Yanez Gender               Male   Medical Record      [id number]    Room Number          3772  Number   Account #           [de-identified]   Procedure Date       03/15/2022   Corporate ID                    Ordering Physician   Li Dixon DO   Accession Number    [id number]  Referring Physician  Bon Borges   Date of Birth       1950  Sonographer          Liz South Jared Rodri   Age                 73 year(s)  Interpreting         Luretha Patch DO                                  Physician                                   Any Other  Procedure Type of Study   TTE procedure  Procedure Date Date: 03/15/2022 Start: 10:12 AM Study Location: Echo Lab Technical Quality: Adequate visualization Indications:Congestive heart failure. Patient Status: Routine Height: 72 inches Weight: 193 pounds BSA: 2.1 m^2 BMI: 26.18 kg/m^2 BP: 112/73 mmHg  Findings   Left Ventricle  Left ventricle grossly normal in size. Normal LV segmental wall motion. Normal left ventricular wall thickness. Estimated left ventricular ejection fraction is 62±5%. Does not meet 50% diagnostic criteria for diastolic dysfunction. Right Ventricle  Moderately dilated right ventricle. TAPSE is normal   Left Atrium  Left atrium is of normal size. The LAESV Index is <34ml/m2. Interatrial septum appears intact. Right Atrium  Mildly enlarged right atrium. Mitral Valve  Structurally normal mitral valve. Physiologic and/or trace mitral regurgitation is present. Tricuspid Valve  The tricuspid valve appears structurally normal.  Physiologic and/or trace tricuspid regurgitation. RVSP is 14 mmHg. Aortic Valve  The aortic valve is trileaflet. Aortic valve opens well. No doppler evidence of aortic stenosis or regurgitation. Pulmonic Valve  Pulmonic valve is structurally normal.  No evidence of pulmonic regurgitation. Pericardial Effusion  No evidence of pericardial thickening/calcification present. No evidence of pericardial effusion. Aorta  Aortic root dimension within normal limits. Aorta appears sclerotic and/or calcified. Miscellaneous  Dilated Inferior Vena Cava. Conclusions   Summary  Left ventricle grossly normal in size. Normal LV segmental wall motion. Normal left ventricular wall thickness. Estimated left ventricular ejection fraction is 62±5%. Does not meet 50% diagnostic criteria for diastolic dysfunction. The LAESV Index is <34ml/m2. Moderately dilated right ventricle. TAPSE is normal  Physiologic and/or trace mitral regurgitation is present. Technically fair quality study.   Compared to prior echo, no significant changes noted.  Suggest clinical correlation.    Signature   ----------------------------------------------------------------  Electronically signed by Savage MALONEYInterpreting  physician) on 03/15/2022 10:40 PM  ----------------------------------------------------------------  M-Mode/2D Measurements & Calculations   LV Diastolic    LV Systolic Dimension: 3.4  AV Cusp Separation: 2.3 cmLA  Dimension: 4.4  cm                          Dimension: 3.2 cmAO Root  cm              LV Volume Diastolic: 86 ml  Dimension: 3.9 cm  LV FS:22.7 %    LV Volume Systolic: 75.1 ml  LV PW           LV EDV/LV EDV Index: 86  Diastolic: 1 cm KU/65 CZ/F^0JK ESV/LV ESV  LV PW Systolic: Index: 54.1 KN/24MV/ m^2    RV Diastolic Dimension: 4.7 cm  1 cm            EF Calculated: 44.9 %  Septum          LV Mass Index: 66 l/min*m^2 LA/Aorta: 4.40  Diastolic: 0.9  LV Length: 8.9 cm           Ascending Aorta: 3.3 cm  cm                                          LA volume/Index: 41.1 ml  Septum          LVOT: 2.3 cm                /63LE/E^4  Systolic: 1.7                               RA Area: 20.1 cm^2  cm   LV Mass: 137.77  g  Doppler Measurements & Calculations   MV Peak E-Wave: 0.54 AV Peak Velocity:     LVOT Peak Velocity: 1.13 m/s  m/s                  1.01 m/s              LVOT Mean Velocity: 0.64 m/s  MV Peak A-Wave: 0.73 AV Peak Gradient:     LVOT Peak Gradient: 5.1  m/s                  4.08 mmHg             mmHgLVOT Mean Gradient: 2 mmHg  MV E/A Ratio: 0.74   AV Mean Velocity:  MV Peak Gradient:    0.64 m/s  2.8 mmHg             AV Mean Gradient: 1.9  MV Mean Gradient:    mmHg                  TR Velocity:1.23 m/s  1.6 mmHg             AV VTI: 20.1 cm       TR Gradient:6.09 mmHg  MV Mean Velocity:    AV Area               PV Peak Velocity: 0.88 m/s  0.6 m/s              (Continuity):4.5 cm^2 PV Peak Gradient: 3.1 mmHg  MV Deceleration                            PV Mean Velocity: 0.65 m/s  Time: 256.3 msec     LVOT VTI: 21.8 cm     PV Mean Gradient: 1.8 mmHg  MV P1/2t: 48 msec  MVA by PHT:4.58 cm^2  MV Area  (continuity): 4.9  cm^2  MV E' Septal  Velocity: 0.07 m/s  MV E' Lateral  Velocity: 11 m/s  [email or web address] [id number]    CT CHEST WO CONTRAST    Result Date: 3/15/2022  EXAMINATION: CT OF THE CHEST WITHOUT CONTRAST 3/15/2022 4:11 pm TECHNIQUE: CT of the chest was performed without the administration of intravenous contrast. Multiplanar reformatted images are provided for review. Dose modulation, iterative reconstruction, and/or weight based adjustment of the mA/kV was utilized to reduce the radiation dose to as low as reasonably achievable. COMPARISON: April 11, 2021 and October 15, 2020. Marizol Flower HISTORY: ORDERING SYSTEM PROVIDED HISTORY: lung mass / infiltrate TECHNOLOGIST PROVIDED HISTORY: Reason for exam:->lung mass / infiltrate FINDINGS: The heart size is normal.  Small pericardial effusion is present. There is coronary artery calcification. Great vessels are normal.  Moderately enlarged mediastinal lymphadenopathy with lymph nodes measuring up to 1.2 cm in the mediastinum are noted. This is somewhat improved. There is advanced centrilobular emphysema with extensive scarring and fibrosis with large bullous changes. The previously noted infiltrative mass in the left upper lobe has resolved. Multiple spiculated nodular densities in the upper and lower lobes ranging from 6 to 8 mm are noted bilaterally without change. However, there are some nodular densities which are new especially  a 2 x 1.1 cm infiltrative nodule in the right middle lobe. There is scarring and fibrosis in the lung bases. There is no focal consolidation. The liver is of normal architecture. Degenerative changes are identified in the thoracic spine. Advanced centrilobular emphysema with extensive scarring, fibrosis and bullous changes.  Resolution of the previously noted infiltrative mass in the left upper lobe with the extensive  spiculated and infiltrative nodules and masses some of which are new especially the 1 in the right middle lobe which are indeterminate for developing malignancy. Close surveillance according to Fleischner society guidelines is recommended. Borderline cardiac size with the tiny pericardial effusion and coronary artery calcification. XR CHEST PORTABLE    Result Date: 3/14/2022  EXAMINATION: ONE XRAY VIEW OF THE CHEST 3/14/2022 12:34 pm COMPARISON: 1st February 2022 HISTORY: ORDERING SYSTEM PROVIDED HISTORY: shortness of breath TECHNOLOGIST PROVIDED HISTORY: Reason for exam:->shortness of breath FINDINGS: Mildly worsening pneumonia at the right base and left mid lung. There is stable pneumonia and or fibrosis at the left base. Pulmonary hypertension is suggested. There is pronounced obstructive airways disease. Worsening pneumonia at the right base and left mid lung. Other stable findings as enumerated above. 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 *      RECOMMENDATIONS:  Mr. Adam Villanueva is still SOB on exam today. Will continue low-dose diuretic aggressive pulmonary care. Would consider obtaining a nuclear stress test however the patient is currently to SOB to tolerate procedure.   Will continue to monitor labs, rate, rhythm, and clinical picture and will adjust cardiac medications as warranted. I have spent more than 45 minutes face to face with Randi Luis reviewing notes and laboratory data with greater than 50% of this time instructing and counseling the patient regarding my findings and recommendations and I have answered all questions as posed to me by . Nitish Doyle. Thank you,Jim Merlos and Savage Cortés MD for allowing me to consult in the care of this patient. Abdirizak Estrada, DO FACP,FACC,FSCAI      NOTE:  This report was transcribed using voice recognition software.   Every effort was made to ensure accuracy; however, inadvertent computerized transcription errors may be present

## 2022-03-17 NOTE — PROGRESS NOTES
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 *      SUBJECTIVE:  Ramon Newby states he is still feeling SOB. He is complaining of SOB at rest as well as with exertion. He is in good spirits. He denies chest discomfort, n/v, peripheral edema. REVIEW OF SYSTEMS:  General ROS: negative for - fatigue, malaise,  weight gain or weight loss  Psychological ROS: negative for - anxiety , depression  Ophthalmic ROS: negative for - decreased vision or visual distortion. ENT ROS: negative  Allergy and Immunology ROS: negative  Hematological and Lymphatic ROS: negative  Endocrine: no heat or cold intolerance and no polyphagia, polydipsia, or polyuria  Respiratory ROS: positive for - shortness of breath and TABOR  Cardiovascular ROS: no - chest pain, edema, palpitations and rapid heart rate. Gastrointestinal ROS: no abdominal pain, change in bowel habits, or black or bloody stools  Genito-Urinary ROS: no nocturia, dysuria, trouble voiding, frequency or hematuria  Musculoskeletal ROS: negative for- myalgias, arthralgias, or claudication  Neurological ROS: no TIA or stroke symptoms otherwise no significant change in symptoms or problems since yesterday as documented in previous progress notes.     SCHEDULED MEDICATIONS:   doxycycline (VIBRAMYCIN) IV  100 mg IntraVENous Q12H    torsemide  10 mg Oral Once per day on Mon Wed Fri    budesonide  1,000 mcg Nebulization BID    ipratropium-albuterol  3 mL Inhalation Q4H    azithromycin  500 mg Oral Once per day on Mon Wed Fri    ferrous sulfate  325 mg Oral BID WC    lactobacillus  1 capsule Oral Daily    pantoprazole  40 mg Oral QAM AC    enoxaparin  40 mg SubCUTAneous Daily    Arformoterol Tartrate  15 mcg Nebulization BID    insulin lispro  0-6 Units SubCUTAneous TID WC    insulin lispro  0-3 Units SubCUTAneous Nightly    methylPREDNISolone  40 mg IntraVENous Q8H       VITAL SIGNS:                                                                                                                          /75   Pulse 81   Temp 98.1 °F (36.7 °C) (Oral)   Resp 18   Ht 6' 1\" (1.854 m)   Wt 193 lb (87.5 kg)   SpO2 96%   BMI 25.46 kg/m²   Patient Vitals for the past 96 hrs (Last 3 readings):   Weight   03/14/22 1257 193 lb (87.5 kg)     OBJECTIVE:    HEENT: PERRL, EOM  Intact; sclera non-icteric, conjunctiva pink. Carotids are brisk in upstroke with normal contour. No carotid bruits. Normal jugular venous pulsation at 45°. No palpable cervical nor supraclavicular nodes. Thyroid not palpable. Trachea midline. Chest: Even excursion  Lungs: Mildly tachypnic. CTA B, no expiratory wheezes or rhonchi, no decreased tactile fremitus without inspiratory rales. Heart: Regular  rhythm; S1 > S2, no gallop or murmur. No clicks, rub, palpable thrills   or heaves. PMI nondisplaced, 5th intercostal space MCL. Abdomen: Soft, nontender, nondistended,  scaphoid, no masses or organomegaly. Bowel sounds active. Extremities: Without clubbing, cyanosis or edema. Pulses present 3+ upper extermities bilaterally; present 2+ DP and present 2+ PT bilaterally.      Data:   Scheduled Meds: Reviewed  Continuous Infusions:    dextrose         Intake/Output Summary (Last 24 hours) at 3/17/2022 0142  Last data filed at 3/16/2022 2326  Gross per 24 hour   Intake 185.32 ml   Output --   Net 185.32 ml     CBC:   Recent Labs     03/14/22  1316 03/15/22  0348 03/16/22  0750   WBC 7.6 5.8 12.8*   HGB 16.2 14.9 15.7   HCT 48.6 44.4 48.2    256 258     BMP:  Recent Labs     03/14/22  1316 03/14/22  1316 03/15/22  0348 03/15/22  0348 03/16/22  0750     --  134  --  138   K 4.7  --  3.9  --  3.9     --  100  --  102   CO2 24  --  22  --  24   BUN 9  --  9  --  13   CREATININE 0.8  --  0.7  --  0.6*   LABGLOM >60   < > >60   < > >60    < > = values in this interval not displayed. ABGs:   Lab Results   Component Value Date    PH 7.458 03/14/2022    PO2 55.0 03/14/2022    PCO2 30.0 03/14/2022     INR: No results for input(s): INR in the last 72 hours.   PRO-BNP:   Lab Results   Component Value Date    PROBNP 1,632 (H) 03/15/2022    PROBNP 2,696 (H) 03/14/2022      TSH:   Lab Results   Component Value Date    TSH 0.455 03/15/2022      Cardiac Injury Profile:   Recent Labs     03/14/22  1316 03/14/22  1805 03/15/22  0348   TROPHS 8 6 7      Lipid Profile:   Lab Results   Component Value Date    TRIG 47 03/15/2022    HDL 62 03/15/2022    LDLCALC 74 03/15/2022    CHOL 145 03/15/2022      Hemoglobin A1C: No components found for: HGBA1C      RAD:   Echo Complete    Result Date: 3/15/2022  Transthoracic Echocardiography Report (TTE)  Demographics   Patient Name        Morgan Hidalgo Gender               Male   Medical Record      [id number]    Room Number          6301  Number   Account #           [de-identified]   Procedure Date       03/15/2022   Corporate ID                    Ordering Physician   Doris Antonio DO   Accession Number    [id number]  Referring Physician  Saira Butt   Date of Birth       1950  Sonographer          [place]   Age                 73 year(s)  Interpreting         Doris Antonio DO                                  Physician                                   Any Other  Procedure Type of Study   TTE procedure  Procedure Date Date: 03/15/2022 Start: 10:12 AM Study Location: Echo Lab Technical Quality: Adequate visualization Indications:Congestive heart failure. Patient Status: Routine Height: 72 inches Weight: 193 pounds BSA: 2.1 m^2 BMI: 26.18 kg/m^2 BP: 112/73 mmHg  Findings   Left Ventricle  Left ventricle grossly normal in size. Normal LV segmental wall motion. Normal left ventricular wall thickness. Estimated left ventricular ejection fraction is 62±5%. Does not meet 50% diagnostic criteria for diastolic dysfunction. Right Ventricle  Moderately dilated right ventricle. TAPSE is normal   Left Atrium  Left atrium is of normal size. The LAESV Index is <34ml/m2. Interatrial septum appears intact. Right Atrium  Mildly enlarged right atrium. Mitral Valve  Structurally normal mitral valve. Physiologic and/or trace mitral regurgitation is present. Tricuspid Valve  The tricuspid valve appears structurally normal.  Physiologic and/or trace tricuspid regurgitation. RVSP is 14 mmHg. Aortic Valve  The aortic valve is trileaflet. Aortic valve opens well. No doppler evidence of aortic stenosis or regurgitation. Pulmonic Valve  Pulmonic valve is structurally normal.  No evidence of pulmonic regurgitation. Pericardial Effusion  No evidence of pericardial thickening/calcification present. No evidence of pericardial effusion. Aorta  Aortic root dimension within normal limits. Aorta appears sclerotic and/or calcified. Miscellaneous  Dilated Inferior Vena Cava. Conclusions   Summary  Left ventricle grossly normal in size. Normal LV segmental wall motion. Normal left ventricular wall thickness. Estimated left ventricular ejection fraction is 62±5%. Does not meet 50% diagnostic criteria for diastolic dysfunction. The LAESV Index is <34ml/m2. Moderately dilated right ventricle. TAPSE is normal  Physiologic and/or trace mitral regurgitation is present. Technically fair quality study. Compared to prior echo, no significant changes noted. Suggest clinical correlation.    Signature ----------------------------------------------------------------  Electronically signed by Yoshi MALONEYInterpreting  physician) on 03/15/2022 10:40 PM  ----------------------------------------------------------------  M-Mode/2D Measurements & Calculations   LV Diastolic    LV Systolic Dimension: 3.4  AV Cusp Separation: 2.3 cmLA  Dimension: 4.4  cm                          Dimension: 3.2 cmAO Root  cm              LV Volume Diastolic: 86 ml  Dimension: 3.9 cm  LV FS:22.7 %    LV Volume Systolic: 10.1 ml  LV PW           LV EDV/LV EDV Index: 86  Diastolic: 1 cm ZD/14 UY/Z^4SW ESV/LV ESV  LV PW Systolic: Index: 92.5 YW/68DR/ m^2    RV Diastolic Dimension: 4.7 cm  1 cm            EF Calculated: 44.9 %  Septum          LV Mass Index: 66 l/min*m^2 LA/Aorta: 3.00  Diastolic: 0.9  LV Length: 8.9 cm           Ascending Aorta: 3.3 cm  cm                                          LA volume/Index: 41.1 ml  Septum          LVOT: 2.3 cm                /30CA/D^6  Systolic: 1.7                               RA Area: 20.1 cm^2  cm   LV Mass: 137.77  g  Doppler Measurements & Calculations   MV Peak E-Wave: 0.54 AV Peak Velocity:     LVOT Peak Velocity: 1.13 m/s  m/s                  1.01 m/s              LVOT Mean Velocity: 0.64 m/s  MV Peak A-Wave: 0.73 AV Peak Gradient:     LVOT Peak Gradient: 5.1  m/s                  4.08 mmHg             mmHgLVOT Mean Gradient: 2 mmHg  MV E/A Ratio: 0.74   AV Mean Velocity:  MV Peak Gradient:    0.64 m/s  2.8 mmHg             AV Mean Gradient: 1.9  MV Mean Gradient:    mmHg                  TR Velocity:1.23 m/s  1.6 mmHg             AV VTI: 20.1 cm       TR Gradient:6.09 mmHg  MV Mean Velocity:    AV Area               PV Peak Velocity: 0.88 m/s  0.6 m/s              (Continuity):4.5 cm^2 PV Peak Gradient: 3.1 mmHg  MV Deceleration                            PV Mean Velocity: 0.65 m/s  Time: 256.3 msec     LVOT VTI: 21.8 cm     PV Mean Gradient: 1.8 mmHg  MV P1/2t: 48 msec  MVA by PHT:4.58 cm^2  MV Area  (continuity): 4.9  cm^2  MV E' Septal  Velocity: 0.07 m/s  MV E' Lateral  Velocity: 11 m/s  [id number]    CT CHEST WO CONTRAST    Result Date: 3/15/2022  EXAMINATION: CT OF THE CHEST WITHOUT CONTRAST 3/15/2022 4:11 pm TECHNIQUE: CT of the chest was performed without the administration of intravenous contrast. Multiplanar reformatted images are provided for review. Dose modulation, iterative reconstruction, and/or weight based adjustment of the mA/kV was utilized to reduce the radiation dose to as low as reasonably achievable. COMPARISON: April 11, 2021 and October 15, 2020. Anjana Campos HISTORY: ORDERING SYSTEM PROVIDED HISTORY: lung mass / infiltrate TECHNOLOGIST PROVIDED HISTORY: Reason for exam:->lung mass / infiltrate FINDINGS: The heart size is normal.  Small pericardial effusion is present. There is coronary artery calcification. Great vessels are normal.  Moderately enlarged mediastinal lymphadenopathy with lymph nodes measuring up to 1.2 cm in the mediastinum are noted. This is somewhat improved. There is advanced centrilobular emphysema with extensive scarring and fibrosis with large bullous changes. The previously noted infiltrative mass in the left upper lobe has resolved. Multiple spiculated nodular densities in the upper and lower lobes ranging from 6 to 8 mm are noted bilaterally without change. However, there are some nodular densities which are new especially  a 2 x 1.1 cm infiltrative nodule in the right middle lobe. There is scarring and fibrosis in the lung bases. There is no focal consolidation. The liver is of normal architecture. Degenerative changes are identified in the thoracic spine. Advanced centrilobular emphysema with extensive scarring, fibrosis and bullous changes.  Resolution of the previously noted infiltrative mass in the left upper lobe with the extensive  spiculated and infiltrative nodules and masses some of which are new especially the 1 in the right middle lobe which are indeterminate for developing malignancy. Close surveillance according to Fleischner society guidelines is recommended. Borderline cardiac size with the tiny pericardial effusion and coronary artery calcification. XR CHEST PORTABLE    Result Date: 3/14/2022  EXAMINATION: ONE XRAY VIEW OF THE CHEST 3/14/2022 12:34 pm COMPARISON: 1st February 2022 HISTORY: ORDERING SYSTEM PROVIDED HISTORY: shortness of breath TECHNOLOGIST PROVIDED HISTORY: Reason for exam:->shortness of breath FINDINGS: Mildly worsening pneumonia at the right base and left mid lung. There is stable pneumonia and or fibrosis at the left base. Pulmonary hypertension is suggested. There is pronounced obstructive airways disease. Worsening pneumonia at the right base and left mid lung. Other stable findings as enumerated above. 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 *      RECOMMENDATIONS:  Mr. Romayne Priestly is still SOB on exam today. Will continue low-dose diuretic aggressive pulmonary care. Would consider obtaining a nuclear stress test however the patient is currently to SOB to tolerate procedure. I will reassess clinical picture for stress test in near future.   Will continue to monitor labs, rate, rhythm, and clinical picture and will adjust cardiac medications as warranted. I have spent more than 25 minutes face to face with Devin Gomes and reviewing notes and laboratory data, with greater than 50% of this time instructing and counseling the patient face to face regarding my findings and recommendations and I have answered all questions as posed to me by Mr. Torrey Flood. Jolie Humphrey, DO FACP,FACC,FSCAI      NOTE:  This report was transcribed using voice recognition software.   Every effort was made to ensure accuracy; however, inadvertent computerized transcription errors may be present

## 2022-03-17 NOTE — PROGRESS NOTES
Physical Therapy    PT eval attempted in PM; pt reports fatigued from tests. Will re-attempt at later date.

## 2022-03-18 VITALS
SYSTOLIC BLOOD PRESSURE: 130 MMHG | HEIGHT: 73 IN | OXYGEN SATURATION: 90 % | HEART RATE: 77 BPM | RESPIRATION RATE: 18 BRPM | BODY MASS INDEX: 24.52 KG/M2 | DIASTOLIC BLOOD PRESSURE: 70 MMHG | TEMPERATURE: 98.4 F | WEIGHT: 185 LBS

## 2022-03-18 LAB
ALBUMIN SERPL-MCNC: 3.7 G/DL (ref 3.5–5.2)
ALP BLD-CCNC: 69 U/L (ref 40–129)
ALT SERPL-CCNC: 12 U/L (ref 0–40)
ANION GAP SERPL CALCULATED.3IONS-SCNC: 11 MMOL/L (ref 7–16)
ANISOCYTOSIS: ABNORMAL
AST SERPL-CCNC: 11 U/L (ref 0–39)
BASOPHILS ABSOLUTE: 0.01 E9/L (ref 0–0.2)
BASOPHILS RELATIVE PERCENT: 0.1 % (ref 0–2)
BILIRUB SERPL-MCNC: 0.3 MG/DL (ref 0–1.2)
BILIRUBIN DIRECT: <0.2 MG/DL (ref 0–0.3)
BILIRUBIN, INDIRECT: ABNORMAL MG/DL (ref 0–1)
BUN BLDV-MCNC: 17 MG/DL (ref 6–23)
C-REACTIVE PROTEIN: 0.3 MG/DL (ref 0–0.4)
CALCIUM SERPL-MCNC: 9.7 MG/DL (ref 8.6–10.2)
CHLORIDE BLD-SCNC: 99 MMOL/L (ref 98–107)
CO2: 23 MMOL/L (ref 22–29)
CREAT SERPL-MCNC: 0.6 MG/DL (ref 0.7–1.2)
EOSINOPHILS ABSOLUTE: 0 E9/L (ref 0.05–0.5)
EOSINOPHILS RELATIVE PERCENT: 0 % (ref 0–6)
GFR AFRICAN AMERICAN: >60
GFR NON-AFRICAN AMERICAN: >60 ML/MIN/1.73
GLUCOSE BLD-MCNC: 130 MG/DL (ref 74–99)
HCT VFR BLD CALC: 48.8 % (ref 37–54)
HEMOGLOBIN: 16.2 G/DL (ref 12.5–16.5)
IMMATURE GRANULOCYTES #: 0.08 E9/L
IMMATURE GRANULOCYTES %: 0.8 % (ref 0–5)
LACTIC ACID, SEPSIS: 2.1 MMOL/L (ref 0.5–1.9)
LYMPHOCYTES ABSOLUTE: 0.46 E9/L (ref 1.5–4)
LYMPHOCYTES RELATIVE PERCENT: 4.4 % (ref 20–42)
MAGNESIUM: 2 MG/DL (ref 1.6–2.6)
MCH RBC QN AUTO: 30.6 PG (ref 26–35)
MCHC RBC AUTO-ENTMCNC: 33.2 % (ref 32–34.5)
MCV RBC AUTO: 92.1 FL (ref 80–99.9)
METER GLUCOSE: 123 MG/DL (ref 74–99)
METER GLUCOSE: 90 MG/DL (ref 74–99)
MONOCYTES ABSOLUTE: 0.36 E9/L (ref 0.1–0.95)
MONOCYTES RELATIVE PERCENT: 3.4 % (ref 2–12)
NEUTROPHILS ABSOLUTE: 9.64 E9/L (ref 1.8–7.3)
NEUTROPHILS RELATIVE PERCENT: 91.3 % (ref 43–80)
PDW BLD-RTO: 15.5 FL (ref 11.5–15)
PHOSPHORUS: 3.3 MG/DL (ref 2.5–4.5)
PLATELET # BLD: 283 E9/L (ref 130–450)
PMV BLD AUTO: 9.1 FL (ref 7–12)
POTASSIUM SERPL-SCNC: 4.4 MMOL/L (ref 3.5–5)
RBC # BLD: 5.3 E12/L (ref 3.8–5.8)
SEDIMENTATION RATE, ERYTHROCYTE: 1 MM/HR (ref 0–15)
SODIUM BLD-SCNC: 133 MMOL/L (ref 132–146)
TOTAL PROTEIN: 6.3 G/DL (ref 6.4–8.3)
WBC # BLD: 10.6 E9/L (ref 4.5–11.5)

## 2022-03-18 PROCEDURE — 6370000000 HC RX 637 (ALT 250 FOR IP): Performed by: INTERNAL MEDICINE

## 2022-03-18 PROCEDURE — 6360000002 HC RX W HCPCS: Performed by: INTERNAL MEDICINE

## 2022-03-18 PROCEDURE — 36415 COLL VENOUS BLD VENIPUNCTURE: CPT

## 2022-03-18 PROCEDURE — 86140 C-REACTIVE PROTEIN: CPT

## 2022-03-18 PROCEDURE — 83605 ASSAY OF LACTIC ACID: CPT

## 2022-03-18 PROCEDURE — 80076 HEPATIC FUNCTION PANEL: CPT

## 2022-03-18 PROCEDURE — 85025 COMPLETE CBC W/AUTO DIFF WBC: CPT

## 2022-03-18 PROCEDURE — 83735 ASSAY OF MAGNESIUM: CPT

## 2022-03-18 PROCEDURE — 85651 RBC SED RATE NONAUTOMATED: CPT

## 2022-03-18 PROCEDURE — 97161 PT EVAL LOW COMPLEX 20 MIN: CPT

## 2022-03-18 PROCEDURE — 6360000002 HC RX W HCPCS

## 2022-03-18 PROCEDURE — 84100 ASSAY OF PHOSPHORUS: CPT

## 2022-03-18 PROCEDURE — 82962 GLUCOSE BLOOD TEST: CPT

## 2022-03-18 PROCEDURE — 2700000000 HC OXYGEN THERAPY PER DAY

## 2022-03-18 PROCEDURE — 94640 AIRWAY INHALATION TREATMENT: CPT

## 2022-03-18 PROCEDURE — 80048 BASIC METABOLIC PNL TOTAL CA: CPT

## 2022-03-18 RX ORDER — PREDNISONE 20 MG/1
40 TABLET ORAL DAILY
Status: DISCONTINUED | OUTPATIENT
Start: 2022-03-19 | End: 2022-03-18 | Stop reason: HOSPADM

## 2022-03-18 RX ORDER — TORSEMIDE 10 MG/1
10 TABLET ORAL
Qty: 30 TABLET | Refills: 3 | Status: SHIPPED | OUTPATIENT
Start: 2022-03-21 | End: 2022-10-27

## 2022-03-18 RX ORDER — DOXYCYCLINE HYCLATE 100 MG/1
100 CAPSULE ORAL EVERY 12 HOURS SCHEDULED
Qty: 14 CAPSULE | Refills: 0 | Status: SHIPPED | OUTPATIENT
Start: 2022-03-18 | End: 2022-03-25

## 2022-03-18 RX ORDER — PREDNISONE 20 MG/1
40 TABLET ORAL DAILY
Qty: 10 TABLET | Refills: 0 | Status: SHIPPED | OUTPATIENT
Start: 2022-03-19 | End: 2022-03-24

## 2022-03-18 RX ADMIN — TORSEMIDE 10 MG: 20 TABLET ORAL at 09:27

## 2022-03-18 RX ADMIN — BUDESONIDE 1000 MCG: 0.25 SUSPENSION RESPIRATORY (INHALATION) at 09:01

## 2022-03-18 RX ADMIN — IPRATROPIUM BROMIDE AND ALBUTEROL SULFATE 3 ML: .5; 2.5 SOLUTION RESPIRATORY (INHALATION) at 16:23

## 2022-03-18 RX ADMIN — PANTOPRAZOLE SODIUM 40 MG: 40 TABLET, DELAYED RELEASE ORAL at 05:23

## 2022-03-18 RX ADMIN — METHYLPREDNISOLONE SODIUM SUCCINATE 40 MG: 40 INJECTION, POWDER, LYOPHILIZED, FOR SOLUTION INTRAMUSCULAR; INTRAVENOUS at 05:23

## 2022-03-18 RX ADMIN — ARFORMOTEROL TARTRATE 15 MCG: 15 SOLUTION RESPIRATORY (INHALATION) at 09:00

## 2022-03-18 RX ADMIN — FERROUS SULFATE TAB 325 MG (65 MG ELEMENTAL FE) 325 MG: 325 (65 FE) TAB at 09:27

## 2022-03-18 RX ADMIN — IPRATROPIUM BROMIDE AND ALBUTEROL SULFATE 3 ML: .5; 2.5 SOLUTION RESPIRATORY (INHALATION) at 12:05

## 2022-03-18 RX ADMIN — AZITHROMYCIN 500 MG: 250 TABLET, FILM COATED ORAL at 14:09

## 2022-03-18 RX ADMIN — Medication 1 CAPSULE: at 09:27

## 2022-03-18 RX ADMIN — ENOXAPARIN SODIUM 40 MG: 100 INJECTION SUBCUTANEOUS at 09:28

## 2022-03-18 RX ADMIN — DOXYCYCLINE HYCLATE 100 MG: 100 CAPSULE ORAL at 09:27

## 2022-03-18 RX ADMIN — IPRATROPIUM BROMIDE AND ALBUTEROL SULFATE 3 ML: .5; 2.5 SOLUTION RESPIRATORY (INHALATION) at 09:00

## 2022-03-18 ASSESSMENT — PAIN SCALES - GENERAL
PAINLEVEL_OUTOF10: 0
PAINLEVEL_OUTOF10: 0

## 2022-03-18 NOTE — PROGRESS NOTES
Subjective:    Chief complaint:  Breathing slowly improving  He has no other specific complaints  Objective:    /70   Pulse 77   Temp 98.4 °F (36.9 °C) (Axillary)   Resp 18   Ht 6' 1\" (1.854 m)   Wt 185 lb (83.9 kg)   SpO2 90%   BMI 24.41 kg/m²   General : Awake ,alert,no distress. Heart:  RRR, no murmurs, gallops, or rubs. Lungs:  CTA bilaterally, no wheeze, rales or rhonchi  Abd: bowel sounds present, nontender, nondistended, no masses  Extrem:  No clubbing, cyanosis, or edema    CBC:   Lab Results   Component Value Date    WBC 10.6 03/18/2022    RBC 5.30 03/18/2022    HGB 16.2 03/18/2022    HCT 48.8 03/18/2022    MCV 92.1 03/18/2022    MCH 30.6 03/18/2022    MCHC 33.2 03/18/2022    RDW 15.5 03/18/2022     03/18/2022    MPV 9.1 03/18/2022     BMP:    Lab Results   Component Value Date     03/18/2022    K 4.4 03/18/2022    K 4.7 03/14/2022    CL 99 03/18/2022    CO2 23 03/18/2022    BUN 17 03/18/2022    LABALBU 3.7 03/18/2022    CREATININE 0.6 03/18/2022    CALCIUM 9.7 03/18/2022    GFRAA >60 03/18/2022    LABGLOM >60 03/18/2022    GLUCOSE 130 03/18/2022     PT/INR:    Lab Results   Component Value Date    PROTIME 12.5 05/30/2019    INR 1.1 05/30/2019     Troponin:    Lab Results   Component Value Date    TROPONINI <0.01 04/08/2021       No results for input(s): LABURIN in the last 72 hours. No results for input(s): BC in the last 72 hours. No results for input(s): Bess Sampson in the last 72 hours.       Current Facility-Administered Medications:     [START ON 3/19/2022] predniSONE (DELTASONE) tablet 40 mg, 40 mg, Oral, Daily, Leopold Lindsay, MD    doxycycline hyclate (VIBRAMYCIN) capsule 100 mg, 100 mg, Oral, 2 times per day, Earnesteen Proper DO Fiona, 100 mg at 03/18/22 2427    torsemide (DEMADEX) tablet 10 mg, 10 mg, Oral, Once per day on Mon Wed Fri, Tre Bravo DO, 10 mg at 03/18/22 8852    budesonide (PULMICORT) nebulizer suspension 1,000 mcg, 1,000 mcg, Nebulization, BID, Robert Villafana MD, 1,000 mcg at 03/18/22 0901    ipratropium-albuterol (DUONEB) nebulizer solution 3 mL, 3 mL, Inhalation, Q4H, Robert Villafana MD, 3 mL at 03/18/22 1205    azithromycin [facility]) tablet 500 mg, 500 mg, Oral, Once per day on Mon Wed Fri, Jim Jaimes, DO, 500 mg at 03/18/22 1409    ferrous sulfate (IRON 325) tablet 325 mg, 325 mg, Oral, BID WC, Jim Jaimes, DO, 325 mg at 03/18/22 0927    lactobacillus (CULTURELLE) capsule 1 capsule, 1 capsule, Oral, Daily, Gene Jaimes DO, 1 capsule at 03/18/22 0927    pantoprazole (PROTONIX) tablet 40 mg, 40 mg, Oral, QAM AC, Jim Jaimes, DO, 40 mg at 03/18/22 0523    enoxaparin (LOVENOX) injection 40 mg, 40 mg, SubCUTAneous, Daily, Gene Jaimes, DO, 40 mg at 03/18/22 0569    Arformoterol Tartrate (BROVANA) nebulizer solution 15 mcg, 15 mcg, Nebulization, BID, Jim Jaimes, DO, 15 mcg at 03/18/22 0900    potassium chloride (KLOR-CON M) extended release tablet 40 mEq, 40 mEq, Oral, PRN **OR** potassium bicarb-citric acid (EFFER-K) effervescent tablet 40 mEq, 40 mEq, Oral, PRN **OR** potassium chloride 10 mEq/100 mL IVPB (Peripheral Line), 10 mEq, IntraVENous, PRN, Gene Bachk, DO    glucose (GLUTOSE) 40 % oral gel 15 g, 15 g, Oral, PRN, Gene Bachk, DO    glucagon (rDNA) injection 1 mg, 1 mg, IntraMUSCular, PRN, Gene Bachk, DO    dextrose 5 % solution, 100 mL/hr, IntraVENous, PRN, Gene Bachk, DO    insulin lispro (HUMALOG) injection vial 0-6 Units, 0-6 Units, SubCUTAneous, TID WC, Jim Jaimes DO, 1 Units at 03/16/22 1728    insulin lispro (HUMALOG) injection vial 0-3 Units, 0-3 Units, SubCUTAneous, Nightly, Jim Jaimes DO, 1 Units at 03/15/22 2236    acetaminophen (TYLENOL) tablet 650 mg, 650 mg, Oral, Q4H PRN, Gene Jaimes DO, 650 mg at 03/17/22 1103    oxyCODONE-acetaminophen (PERCOCET) 5-325 MG per tablet 1 tablet, 1 tablet, Oral, BID PRN **AND** oxyCODONE (ROXICODONE) immediate release tablet 2.5 mg, 2.5 mg, Oral, BID PRN, Rodney Rash Mihok, DO    dextrose bolus (hypoglycemia) 10% 125 mL, 125 mL, IntraVENous, PRN **OR** dextrose bolus (hypoglycemia) 10% 250 mL, 250 mL, IntraVENous, PRN, Rodney Rash Mihok, DO    ADULT DIET; Regular    CT CHEST WO CONTRAST   Final Result   Advanced centrilobular emphysema with extensive scarring, fibrosis and   bullous changes. Resolution of the previously noted infiltrative mass in the left upper lobe   with the extensive  spiculated and infiltrative nodules and masses some of   which are new especially the 1 in the right middle lobe which are   indeterminate for developing malignancy. Close surveillance according to   Fleischner society guidelines is recommended. Borderline cardiac size with the tiny pericardial effusion and coronary   artery calcification. XR CHEST PORTABLE   Final Result   Worsening pneumonia at the right base and left mid lung. Other stable   findings as enumerated above. 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 *      Plan:    Apparently per pulmonology okay to discharge  We will discharge him home  Angel Gagnon MD  4:00 PM  3/18/2022    NOTE: This report was transcribed using voice recognition software.  Every effort was made to ensure accuracy; however, inadvertent transcription errors may be present

## 2022-03-18 NOTE — PROGRESS NOTES
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 *      SUBJECTIVE:  Leonid Saavedra states he is frustrated that his SOB is not improving quicker. He is sitting up in bed with mild tachypnea. He denies chest discomfort, n/v, diaphoresis, peripheral edema. REVIEW OF SYSTEMS:  General ROS: negative for - fatigue, malaise,  weight gain or weight loss  Psychological ROS: negative for - anxiety , depression  Ophthalmic ROS: negative for - decreased vision or visual distortion. ENT ROS: negative  Allergy and Immunology ROS: negative  Hematological and Lymphatic ROS: negative  Endocrine: no heat or cold intolerance and no polyphagia, polydipsia, or polyuria  Respiratory ROS: positive for - shortness of breath and dyspnea on exertion  Cardiovascular ROS: no chest pain, edema, palpitations and rapid heart rate. Gastrointestinal ROS: no abdominal pain, change in bowel habits, or black or bloody stools  Genito-Urinary ROS: no nocturia, dysuria, trouble voiding, frequency or hematuria  Musculoskeletal ROS: negative for- myalgias, arthralgias, or claudication  Neurological ROS: no TIA or stroke symptoms otherwise no significant change in symptoms or problems since yesterday as documented in previous progress notes.     SCHEDULED MEDICATIONS:   doxycycline  100 mg Oral 2 times per day    methylPREDNISolone  40 mg IntraVENous Q12H    torsemide  10 mg Oral Once per day on Mon Wed Fri    budesonide  1,000 mcg Nebulization BID    ipratropium-albuterol  3 mL Inhalation Q4H    azithromycin  500 mg Oral Once per day on Mon Wed Fri    ferrous sulfate  325 mg Oral BID WC    lactobacillus  1 capsule Oral Daily    pantoprazole  40 mg Oral QAM AC    enoxaparin  40 mg SubCUTAneous Daily    Arformoterol Tartrate  15 mcg Nebulization BID    insulin lispro  0-6 Units SubCUTAneous TID WC    insulin lispro  0-3 Units SubCUTAneous Nightly       VITAL SIGNS:                                                                                                                          /70   Pulse 77   Temp 98.4 °F (36.9 °C) (Axillary)   Resp 18   Ht 6' 1\" (1.854 m)   Wt 185 lb (83.9 kg)   SpO2 90%   BMI 24.41 kg/m²   Patient Vitals for the past 96 hrs (Last 3 readings):   Weight   03/18/22 0022 185 lb (83.9 kg)     OBJECTIVE:    HEENT: PERRL, EOM  Intact; sclera non-icteric, conjunctiva pink. Carotids are brisk in upstroke with normal contour. No carotid bruits. Normal jugular venous pulsation at 45°. No palpable cervical nor supraclavicular nodes. Thyroid not palpable. Trachea midline. Chest: Even excursion  Lungs: Mildly tachypnic. CTA B, no expiratory wheezes or rhonchi, no decreased tactile fremitus without inspiratory rales. Heart: Regular  rhythm; S1 > S2, no gallop or murmur. No clicks, rub, palpable thrills   or heaves. PMI nondisplaced, 5th intercostal space MCL. Abdomen: Soft, nontender, nondistended,  scaphoid, no masses or organomegaly. Bowel sounds active. Extremities: Without clubbing, cyanosis or edema. Pulses present 3+ upper extermities bilaterally; present 2+ DP and present 2+ PT bilaterally.      Data:   Scheduled Meds: Reviewed  Continuous Infusions:    dextrose       No intake or output data in the 24 hours ending 03/18/22 1351  CBC:   Recent Labs     03/16/22  0750 03/17/22  0319 03/18/22  0210   WBC 12.8* 11.5 10.6   HGB 15.7 15.8 16.2   HCT 48.2 48.3 48.8    280 283 BMP:  Recent Labs     03/16/22  0750 03/16/22  0750 03/17/22  0319 03/17/22  0319 03/18/22  0210     --  137  --  133   K 3.9  --  4.4  --  4.4     --  99  --  99   CO2 24  --  24  --  23   BUN 13  --  17  --  17   CREATININE 0.6*  --  0.7  --  0.6*   LABGLOM >60   < > >60   < > >60    < > = values in this interval not displayed. ABGs:   Lab Results   Component Value Date    PH 7.458 03/14/2022    PO2 55.0 03/14/2022    PCO2 30.0 03/14/2022     INR: No results for input(s): INR in the last 72 hours. PRO-BNP:   Lab Results   Component Value Date    PROBNP 1,632 (H) 03/15/2022    PROBNP 2,696 (H) 03/14/2022      TSH:   Lab Results   Component Value Date    TSH 0.455 03/15/2022      Cardiac Injury Profile:   No results for input(s): TROPHS in the last 72 hours. Lipid Profile:   Lab Results   Component Value Date    TRIG 47 03/15/2022    HDL 62 03/15/2022    LDLCALC 74 03/15/2022    CHOL 145 03/15/2022      Hemoglobin A1C: No components found for: HGBA1C      RAD:   Echo Complete    Result Date: 3/15/2022  Transthoracic Echocardiography Report (TTE)  Demographics   Patient Name        Diana Denis Gender               Male   Medical Record      [id number]    Room Number          6478  Number   Account #           [de-identified]   Procedure Date       03/15/2022   Corporate ID                    Ordering Physician   Sydney Baez DO   Accession Number    [id number]  Referring Physician  Maureen Elmore   Date of Birth       1950  Sonographer          [place], [place]   Age                 73 year(s)  Interpreting         Sydney Baez DO                                  Physician                                   Any Other  Procedure Type of Study   TTE procedure  Procedure Date Date: 03/15/2022 Start: 10:12 AM Study Location: Echo Lab Technical Quality: Adequate visualization Indications:Congestive heart failure.  Patient Status: Routine Height: 72 inches Weight: 193 pounds BSA: 2.1 m^2 BMI: 26.18 kg/m^2 BP: 112/73 mmHg  Findings   Left Ventricle  Left ventricle grossly normal in size. Normal LV segmental wall motion. Normal left ventricular wall thickness. Estimated left ventricular ejection fraction is 62±5%. Does not meet 50% diagnostic criteria for diastolic dysfunction. Right Ventricle  Moderately dilated right ventricle. TAPSE is normal   Left Atrium  Left atrium is of normal size. The LAESV Index is <34ml/m2. Interatrial septum appears intact. Right Atrium  Mildly enlarged right atrium. Mitral Valve  Structurally normal mitral valve. Physiologic and/or trace mitral regurgitation is present. Tricuspid Valve  The tricuspid valve appears structurally normal.  Physiologic and/or trace tricuspid regurgitation. RVSP is 14 mmHg. Aortic Valve  The aortic valve is trileaflet. Aortic valve opens well. No doppler evidence of aortic stenosis or regurgitation. Pulmonic Valve  Pulmonic valve is structurally normal.  No evidence of pulmonic regurgitation. Pericardial Effusion  No evidence of pericardial thickening/calcification present. No evidence of pericardial effusion. Aorta  Aortic root dimension within normal limits. Aorta appears sclerotic and/or calcified. Miscellaneous  Dilated Inferior Vena Cava. Conclusions   Summary  Left ventricle grossly normal in size. Normal LV segmental wall motion. Normal left ventricular wall thickness. Estimated left ventricular ejection fraction is 62±5%. Does not meet 50% diagnostic criteria for diastolic dysfunction. The LAESV Index is <34ml/m2. Moderately dilated right ventricle. TAPSE is normal  Physiologic and/or trace mitral regurgitation is present. Technically fair quality study. Compared to prior echo, no significant changes noted. Suggest clinical correlation.    Signature   ----------------------------------------------------------------  Electronically signed by Shaye Tripp DO(Interpreting  physician) on 03/15/2022 10:40 PM ----------------------------------------------------------------  M-Mode/2D Measurements & Calculations   LV Diastolic    LV Systolic Dimension: 3.4  AV Cusp Separation: 2.3 cmLA  Dimension: 4.4  cm                          Dimension: 3.2 cmAO Root  cm              LV Volume Diastolic: 86 ml  Dimension: 3.9 cm  LV FS:22.7 %    LV Volume Systolic: 15.9 ml  LV PW           LV EDV/LV EDV Index: 86  Diastolic: 1 cm UI/78 BZ/Q^7GA ESV/LV ESV  LV PW Systolic: Index: 22.4 GJ/47AI/ m^2    RV Diastolic Dimension: 4.7 cm  1 cm            EF Calculated: 44.9 %  Septum          LV Mass Index: 66 l/min*m^2 LA/Aorta: 3.82  Diastolic: 0.9  LV Length: 8.9 cm           Ascending Aorta: 3.3 cm  cm                                          LA volume/Index: 41.1 ml  Septum          LVOT: 2.3 cm                /29EM/B^2  Systolic: 1.7                               RA Area: 20.1 cm^2  cm   LV Mass: 137.77  g  Doppler Measurements & Calculations   MV Peak E-Wave: 0.54 AV Peak Velocity:     LVOT Peak Velocity: 1.13 m/s  m/s                  1.01 m/s              LVOT Mean Velocity: 0.64 m/s  MV Peak A-Wave: 0.73 AV Peak Gradient:     LVOT Peak Gradient: 5.1  m/s                  4.08 mmHg             mmHgLVOT Mean Gradient: 2 mmHg  MV E/A Ratio: 0.74   AV Mean Velocity:  MV Peak Gradient:    0.64 m/s  2.8 mmHg             AV Mean Gradient: 1.9  MV Mean Gradient:    mmHg                  TR Velocity:1.23 m/s  1.6 mmHg             AV VTI: 20.1 cm       TR Gradient:6.09 mmHg  MV Mean Velocity:    AV Area               PV Peak Velocity: 0.88 m/s  0.6 m/s              (Continuity):4.5 cm^2 PV Peak Gradient: 3.1 mmHg  MV Deceleration                            PV Mean Velocity: 0.65 m/s  Time: 256.3 msec     LVOT VTI: 21.8 cm     PV Mean Gradient: 1.8 mmHg  MV P1/2t: 48 msec  MVA by PHT:4.58 cm^2  MV Area  (continuity): 4.9  cm^2  MV E' Septal  Velocity: 0.07 m/s  MV E' Lateral  Velocity: 11 m/s [id number]    CT CHEST WO CONTRAST    Result Date: 3/15/2022  EXAMINATION: CT OF THE CHEST WITHOUT CONTRAST 3/15/2022 4:11 pm TECHNIQUE: CT of the chest was performed without the administration of intravenous contrast. Multiplanar reformatted images are provided for review. Dose modulation, iterative reconstruction, and/or weight based adjustment of the mA/kV was utilized to reduce the radiation dose to as low as reasonably achievable. COMPARISON: April 11, 2021 and October 15, 2020. Lenard Ra HISTORY: ORDERING SYSTEM PROVIDED HISTORY: lung mass / infiltrate TECHNOLOGIST PROVIDED HISTORY: Reason for exam:->lung mass / infiltrate FINDINGS: The heart size is normal.  Small pericardial effusion is present. There is coronary artery calcification. Great vessels are normal.  Moderately enlarged mediastinal lymphadenopathy with lymph nodes measuring up to 1.2 cm in the mediastinum are noted. This is somewhat improved. There is advanced centrilobular emphysema with extensive scarring and fibrosis with large bullous changes. The previously noted infiltrative mass in the left upper lobe has resolved. Multiple spiculated nodular densities in the upper and lower lobes ranging from 6 to 8 mm are noted bilaterally without change. However, there are some nodular densities which are new especially  a 2 x 1.1 cm infiltrative nodule in the right middle lobe. There is scarring and fibrosis in the lung bases. There is no focal consolidation. The liver is of normal architecture. Degenerative changes are identified in the thoracic spine. Advanced centrilobular emphysema with extensive scarring, fibrosis and bullous changes.  Resolution of the previously noted infiltrative mass in the left upper lobe with the extensive  spiculated and infiltrative nodules and masses some of which are new especially the 1 in the right middle lobe which are indeterminate for developing malignancy. Close surveillance according to Fleischner society guidelines is recommended. Borderline cardiac size with the tiny pericardial effusion and coronary artery calcification. XR CHEST PORTABLE    Result Date: 3/14/2022  EXAMINATION: ONE XRAY VIEW OF THE CHEST 3/14/2022 12:34 pm COMPARISON: 1st February 2022 HISTORY: ORDERING SYSTEM PROVIDED HISTORY: shortness of breath TECHNOLOGIST PROVIDED HISTORY: Reason for exam:->shortness of breath FINDINGS: Mildly worsening pneumonia at the right base and left mid lung. There is stable pneumonia and or fibrosis at the left base. Pulmonary hypertension is suggested. There is pronounced obstructive airways disease. Worsening pneumonia at the right base and left mid lung. Other stable findings as enumerated above. 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    Acute heart failure with preserved ejection fraction (HCC)    Immunocompromised (HCC)    Acute exacerbation of chronic obstructive pulmonary disease (COPD) ([facility]  Resolved Problems:    * No resolved hospital problems. *      RECOMMENDATIONS:  Mr. Ray Dubois is still SOB on today. Continue low-dose diuretic and aggressive pulmonary care. Nuclear stress test can not be performed due to the patients ongoing SOB. Will continue to monitor labs, rate, rhythm, and clinical picture and will adjust cardiac medications as warranted.      I have spent more than 25 minutes face to face with Florentin Mcintyre reviewing notes and laboratory data with greater than 50% of this time instructing and counseling the patient regarding my findings and recommendations and I have answered all questions as posed to me by Mr. Esteban Baumann. Thank you,Jim Lira and Maxime Jordan MD for allowing me to consult in the care of this patient. Eldon Mcfadden,  FACP,FACC,[facility]      NOTE:  This report was transcribed using voice recognition software.   Every effort was made to ensure accuracy; however, inadvertent computerized transcription errors may be present

## 2022-03-18 NOTE — PROGRESS NOTES
Physical Therapy    Facility/Department: [address] INTERMEDIATE  Initial Assessment    NAME: Sharona Raymond  : 1950  MRN: [id number]    Date of Service: 3/18/2022      Attending Provider:  Bharath Helms MD    Evaluating PT:  Demetria Mercado. Mariama Manzano, P.T. Room #:  2646/1285-G  Diagnosis:  Acute exacerbation of chronic obstructive pulmonary disease (COPD) ([facility]) [J44.1]  Pertinent PMHx/PSHx:  COPD, emphysema  Precautions:  O2 sat drops with activity    SUBJECTIVE:    Pt lives with wife in a 1 story home with 2 stairs and no rail to enter. Pt ambulated with no AD and uses home O2 at night. OBJECTIVE:   Initial Evaluation  Date: 3/18/22   Was pt agreeable to Eval/treatment? yes   Does pt have pain? No c/o pain   Bed Mobility  Rolling: Independent  Supine to sit: Independent  Sit to supine: Independent  Scooting: Independent   Transfers Sit to stand: Independent  Stand to sit: Independent  Stand pivot: Independent   Ambulation   150 feet with no AD Independent    Stair negotiation: ascended and descended NA   AM-PAC 6 Clicks 39/     BLE ROM is WFL. BLE strength is grossly WFL. Sensation:  Pt denies numbness and tingling to extremities  Edema:  None noted  Balance: sitting is Independent and standing with no AD is Independent   Endurance: fair+    Vitals:  Pt was on 4L O2 throughout PT eval.  Sitting EOB O2 sat was 89% and with amb O2 sat dropped to 83%. ASSESSMENT:    Comments:  Pt is Independent with functional mobility and has no skilled PT needs. During amb his O2 sat did drop, but he was able to hold a conversation and had no SOB. Pt reports his O2 sat normally drops into low 80's due to his chronic lung problems. Pt also reports he is Independent in room and manages O2 line. He states was in BR this am standing for at least 30 min performing ADLs. Pt was left supine in bed with HOB elevated to comfort and call light left by patient. Pt's/ family goals   1.  To breathe better and go home.     Patient and or family understand(s) diagnosis, prognosis, and plan of care. PHYSICAL THERAPY PLAN OF CARE:    PT will discharge pt from our service. Referring provider/PT Order:  PPT eval and treat  Diagnosis:  Acute exacerbation of chronic obstructive pulmonary disease (COPD) [facility] [J44.1]    Time in  07:20  Time out  07:35    Evaluation Time includes thorough review of current medical information, gathering information on past medical history/social history and prior level of function, completion of standardized testing/informal observation of tasks, assessment of data and education on plan of care and goals. CPT codes:  [x] Low Complexity PT evaluation 61868  [] Moderate Complexity PT evaluation 24568  [] High Complexity PT evaluation 69686  [] PT Re-evaluation 18372  [] Gait training 26851 ** minutes  [] Manual therapy 84081 ** minutes  [] Therapeutic activities 94929 ** minutes  [] Therapeutic exercises 00618 ** minutes  [] Neuromuscular reeducation 43068 ** minutes     Mayur Wheat., P.T.   License Number: [id number]

## 2022-03-18 NOTE — PROGRESS NOTES
Pulmonary Progress Note    Admit Date: 3/14/2022  Hospital day                               PCP: Shira Perea MD    Chief Complaint (s):  Patient Active Problem List   Diagnosis    COPD [facility]chronic obstructive [facility] disease) [facility]    Acute and chronic respiratory failure with hypoxia ([facility]    Bullous emphysema ([facility]    Thoracic ascending aortic aneurysm ([facility]    S/P lumbar fusion    Hypophosphatemia    Glucose intolerance    Hilar adenopathy    Pulmonary Mycobacterium avium complex (MAC) infection ([address]    Acute on chronic respiratory failure with hypoxia (HCC)    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 It is exertion that really wears him out.       Vitals:  VITALS:  /70   Pulse 77   Temp 98.4 °F (36.9 °C) (Axillary)   Resp 18   Ht 6' 1\" (1.854 m)   Wt 185 lb (83.9 kg)   SpO2 90%   BMI 24.41 kg/m²     24HR INTAKE/OUTPUT:    No intake or output data in the 24 hours ending 22 1453    24HR PULSE OXIMETRY RANGE:    SpO2  Av.4 %  Min: 89 %  Max: 92 %    Medications:  IV:   dextrose         Scheduled Meds:   doxycycline  100 mg Oral 2 times per day    methylPREDNISolone  40 mg IntraVENous Q12H    torsemide  10 mg Oral Once per day on     budesonide  1,000 mcg Nebulization BID    ipratropium-albuterol  3 mL Inhalation Q4H    azithromycin  500 mg Oral Once per day on     ferrous sulfate  325 mg Oral BID WC    lactobacillus  1 capsule Oral Daily    pantoprazole  40 mg Oral QAM AC    enoxaparin  40 mg SubCUTAneous Daily    Arformoterol Tartrate  15 mcg Nebulization BID    insulin lispro  0-6 Units SubCUTAneous TID WC    insulin lispro  0-3 Units SubCUTAneous Nightly       Diet:   ADULT DIET; Regular     EXAM:  General: No distress. Alert. Eyes: PERRL. No sclera icterus. No conjunctival injection. ENT: No discharge. Pharynx clear. Neck: Trachea midline. Normal thyroid. Resp: No accessory muscle use. no rales. no wheezing. no rhonchi. Diminished. CV: Regular rate. Regular rhythm. No murmur or rub. Abd: Non-tender. Non-distended. No masses. No organomegaly. Normal bowel sounds. Skin: Warm and dry. No nodule on exposed extremities. No rash on exposed extremities. Ext: No cyanosis, clubbing, edema  Lymph: No cervical LAD. No supraclavicular LAD. M/S: No cyanosis. No joint deformity. No clubbing. Neuro: Awake. Follows commands. Positive pupils/gag/corneals. Normal pain response. Results:  CBC:   Recent Labs     03/16/22 0750 03/17/22 0319 03/18/22 0210   WBC 12.8* 11.5 10.6   HGB 15.7 15.8 16.2   HCT 48.2 48.3 48.8   MCV 93.8 91.8 92.1    280 283     BMP:   Recent Labs     03/16/22 0750 03/17/22 0319 03/18/22 0210    137 133   K 3.9 4.4 4.4    99 99   CO2 24 24 23   PHOS 3.1 3.3 3.3   BUN 13 17 17   CREATININE 0.6* 0.7 0.6*     LIVER PROFILE:   Recent Labs     03/16/22 0750 03/17/22 0319 03/18/22 0210   AST 17 13 11   ALT 14 13 12   BILIDIR <0.2 <0.2 <0.2   BILITOT 0.3 0.2 0.3   ALKPHOS 76 73 69     PT/INR: No results for input(s): PROTIME, INR in the last 72 hours. APTT: No results for input(s): APTT in the last 72 hours. PFT:  1. Pulmonary functions as yet to be scanned. Microbiology:  1. None    Recent ABG:   No results for input(s): PH, PO2, PCO2, HCO3, BE, O2SAT, METHB, O2HB, COHB, O2CON, HHB, THB in the last 72 hours.           Recent Films:  CT CHEST WO CONTRAST   Final Result   Advanced centrilobular emphysema with extensive scarring, fibrosis and bullous changes. Resolution of the previously noted infiltrative mass in the left upper lobe   with the extensive  spiculated and infiltrative nodules and masses some of   which are new especially the 1 in the right middle lobe which are   indeterminate for developing malignancy. Close surveillance according to   Fleischner society guidelines is recommended. Borderline cardiac size with the tiny pericardial effusion and coronary   artery calcification. XR CHEST PORTABLE   Final Result   Worsening pneumonia at the right base and left mid lung. Other stable   findings as enumerated above. Assessment:  1. Severe bullous emphysema with associated hypoxia. Pulmonary functions evidence a normal FVC, a reduced FEV1 and a moderately reduced FEV1 to FVC ratio. Diffusing capacity is strikingly relative low at 12% of predicted. 2. New middle lobe nodules: inflammatory versus malignant    Plan:  1. Switch to prednisone, 40 mg daily  2. Continue nebulizers at maximum dose  3. Supplemental oxygen. Wean to baseline of 4L when he is able to tolerate. 4. Reviewed CT scan to see if the patient has a disease that would be amenable to endobronchial valve placement. Unfortunately, the bullous disease extends from the apices to the bases without substantial regional difference. see above  5. Okay to discharge. Time at the bedside, reviewing labs and radiographs, reviewing updated notes and consultations, discussing with staff and family was more than 35 minutes. Please note that voice recognition technology was used in the preparation of this note and make therefore it may contain inadvertent transcription errors. If the patient is a COVID 19 isolation patient, the above physical exam reflects that of the examining physician for the day.         Robert Villafana MD    [facility], [address] [place] [address], [address], [facility] [place] [address]

## 2022-03-19 LAB
BLOOD CULTURE, ROUTINE: NORMAL
CULTURE, BLOOD 2: NORMAL

## 2022-03-21 ENCOUNTER — CARE COORDINATION (OUTPATIENT)
Dept: CARE COORDINATION | Age: 72
End: 2022-03-21

## 2022-03-21 NOTE — CARE COORDINATION
Delaney [facility] Transitions Initial Follow Up Call    Call within 2 business days of discharge: Yes    Patient: Chalino Gilbert Patient : 1950   MRN: [id number]  Reason for Admission: anny 3/14-3/18/2022 copd with hypoxia  Discharge Date: 3/18/22 RARS: Readmission Risk Score: 17.8 ( )      Last Discharge [facility]       Complaint Diagnosis Description Type Department Provider    3/14/22 Shortness of Breath Hypoxia . .. ED to Hosp-Admission (Discharged) (ADMITTED) ANNY Beebe MD; Rosalio Hartsfield . .. Spoke with: unable to contact pt. No option to leave vm. Will attempt again at later time and date     Facility: [address] [place] [address] Transitions 24 Hour Call    Do you have all of your prescriptions and are they filled?: Yes  Post Acute Services:  Outpatient/Community Services (Comment: 21-oxygen through [facility])  Care Transitions Interventions         Follow Up  Future Appointments   Date Time Provider Tan Miranda   2022  2:00 PM MD [facility]   5/3/2022  9:30 AM Keron Blackburn MD [facility]   2022  2:00 PM MGIUE Morin NP [facility]   6/10/2022 10:45 AM Cyndy Needle, DO [facility]   2022  1:30 PM MD [facility]       Catie Crowe LPN

## 2022-03-22 ENCOUNTER — CARE COORDINATION (OUTPATIENT)
Dept: CARE COORDINATION | Age: 72
End: 2022-03-22

## 2022-03-22 NOTE — CARE COORDINATION
Delaney 45 Transitions Initial Follow Up Call    Call within 2 business days of discharge: Yes    Patient: Myla Rushing Patient : 1950 anny 3/14-3/18/2022 copd with hypoxia  MRN: [id number]  Reason for Admission:   Discharge Date: 3/18/22 RARS: Readmission Risk Score: 17.8 ( )      Last Discharge [facility]       Complaint Diagnosis Description Type Department Provider    3/14/22 Shortness of Breath Hypoxia . .. ED to Hosp-Admission (Discharged) (ADMITTED) ANNY Quijano MD; Elsi Villanueva . .. Spoke with: unable to contact pt. No option to leave vm.will close encounter and sign off     Facility: anny     Care Transitions 24 Hour Call    Do you have all of your prescriptions and are they filled?: Yes  Post Acute Services:  Outpatient/Community Services (Comment: 21-oxygen through [facility])  Care Transitions Interventions         Follow Up  Future Appointments   Date Time Provider Tan Miranda   2022  3:15 PM MIGUE Aleman NP [facility]   2022  2:00 PM MD WARD [facility]   5/3/2022  9:30 AM Madonna Freedman MD [facility]   2022  2:00 PM MIGUE Aleman NP [facility]   6/10/2022 10:45 AM DO ANJANA [facility]   2022  1:30 PM MD WARD [facility]       Alem Bryant LPN

## 2022-03-29 NOTE — PROCEDURES
[address] [address]                               PULMONARY FUNCTION    PATIENT NAME: Perla Arroyo                        :        1950  MED REC NO:   [id number]                            ROOM:       [id number]  ACCOUNT NO:   [de-identified]                           ADMIT DATE: 2022  PROVIDER:     Theodore Espinoza MD    DATE OF PROCEDURE:  2022    FVC is normal.  FEV1 is reduced. FEV1/FVC ratio is severely reduced. There is no change in flow rates after the acute administration of  bronchodilators. Lung volumes evidenced air trapping. Diffusion  capacity is markedly reduced. IMPRESSION:  Severe obstructive airways disease with air trapping and a  marked decrease in diffusion capacity, all consistent with the patient's  known emphysema.         Moriah Miles MD    D: 2022 22:34:23       T: 2022 22:36:41     [id number]  Job#: [id number]     Doc#: [id number]    CC:

## 2022-03-30 ENCOUNTER — HOSPITAL ENCOUNTER (OUTPATIENT)
Age: 72
Discharge: HOME | End: 2022-03-30
Payer: MEDICARE

## 2022-03-30 DIAGNOSIS — A31.0 PULMONARY MYCOBACTERIUM AVIUM COMPLEX (MAC) INFECTION (HCC): ICD-10-CM

## 2022-03-30 PROCEDURE — 86317 IMMUNOASSAY INFECTIOUS AGENT: CPT

## 2022-04-02 LAB
Lab: NORMAL
REPORT: NORMAL
THIS TEST SENT TO: NORMAL

## 2022-04-03 LAB
PNEUMOCOCCAL ANTIBODY TYPE 12F: 0.11 UG/ML
PNEUMOCOCCAL ANTIBODY TYPE 14: 2.26 UG/ML
PNEUMOCOCCAL ANTIBODY TYPE 18C: 15.08 UG/ML
PNEUMOCOCCAL ANTIBODY TYPE 19F: 10.73 UG/ML
PNEUMOCOCCAL ANTIBODY TYPE 1: >7.56 UG/ML
PNEUMOCOCCAL ANTIBODY TYPE 23F: 0.83 UG/ML
PNEUMOCOCCAL ANTIBODY TYPE 3: 1.22 UG/ML
PNEUMOCOCCAL ANTIBODY TYPE 4: 0.47 UG/ML
PNEUMOCOCCAL ANTIBODY TYPE 5: 12.36 UG/ML
PNEUMOCOCCAL ANTIBODY TYPE 6B: 0.71 UG/ML
PNEUMOCOCCAL ANTIBODY TYPE 7F: 0.75 UG/ML
PNEUMOCOCCAL ANTIBODY TYPE 8: 0.57 UG/ML
PNEUMOCOCCAL ANTIBODY TYPE 9N: 5.03 UG/ML
PNEUMOCOCCAL ANTIBODY TYPE 9V: 7.39 UG/ML
PNEUMOCOCCAL INTERPRETATION: NORMAL

## 2022-04-10 ENCOUNTER — APPOINTMENT (OUTPATIENT)
Dept: GENERAL RADIOLOGY | Age: 72
DRG: 190 | End: 2022-04-10
Payer: MEDICARE

## 2022-04-10 ENCOUNTER — TELEPHONE (OUTPATIENT)
Dept: OTHER | Facility: CLINIC | Age: 72
End: 2022-04-10

## 2022-04-10 ENCOUNTER — HOSPITAL ENCOUNTER (INPATIENT)
Age: 72
LOS: 4 days | Discharge: HOME | DRG: 190 | End: 2022-04-14
Attending: EMERGENCY MEDICINE | Admitting: INTERNAL MEDICINE
Payer: MEDICARE

## 2022-04-10 DIAGNOSIS — E87.6 HYPOKALEMIA: ICD-10-CM

## 2022-04-10 DIAGNOSIS — J44.1 COPD EXACERBATION (HCC): Primary | ICD-10-CM

## 2022-04-10 DIAGNOSIS — J96.21 ACUTE ON CHRONIC RESPIRATORY FAILURE WITH HYPOXIA (HCC): ICD-10-CM

## 2022-04-10 LAB
ALBUMIN SERPL-MCNC: 4 G/DL (ref 3.5–5.2)
ALP BLD-CCNC: 76 U/L (ref 40–129)
ALT SERPL-CCNC: 13 U/L (ref 0–40)
ANION GAP SERPL CALCULATED.3IONS-SCNC: 12 MMOL/L (ref 7–16)
AST SERPL-CCNC: 16 U/L (ref 0–39)
B.E.: -4.1 MMOL/L (ref -3–3)
BASOPHILS ABSOLUTE: 0.04 E9/L (ref 0–0.2)
BASOPHILS RELATIVE PERCENT: 0.5 % (ref 0–2)
BILIRUB SERPL-MCNC: 0.6 MG/DL (ref 0–1.2)
BUN BLDV-MCNC: 9 MG/DL (ref 6–23)
CALCIUM SERPL-MCNC: 9.1 MG/DL (ref 8.6–10.2)
CHLORIDE BLD-SCNC: 104 MMOL/L (ref 98–107)
CO2: 23 MMOL/L (ref 22–29)
COHB: 1.8 % (ref 0–1.5)
CREAT SERPL-MCNC: 0.7 MG/DL (ref 0.7–1.2)
CRITICAL: ABNORMAL
DATE ANALYZED: ABNORMAL
DATE OF COLLECTION: ABNORMAL
EOSINOPHILS ABSOLUTE: 0.17 E9/L (ref 0.05–0.5)
EOSINOPHILS RELATIVE PERCENT: 2.2 % (ref 0–6)
GFR AFRICAN AMERICAN: >60
GFR NON-AFRICAN AMERICAN: >60 ML/MIN/1.73
GLUCOSE BLD-MCNC: 94 MG/DL (ref 74–99)
HCO3: 18.7 MMOL/L (ref 22–26)
HCT VFR BLD CALC: 48.6 % (ref 37–54)
HEMOGLOBIN: 16.2 G/DL (ref 12.5–16.5)
HHB: 8.1 % (ref 0–5)
IMMATURE GRANULOCYTES #: 0.03 E9/L
IMMATURE GRANULOCYTES %: 0.4 % (ref 0–5)
LAB: ABNORMAL
LYMPHOCYTES ABSOLUTE: 1.22 E9/L (ref 1.5–4)
LYMPHOCYTES RELATIVE PERCENT: 16 % (ref 20–42)
Lab: ABNORMAL
MAGNESIUM: 1.8 MG/DL (ref 1.6–2.6)
MCH RBC QN AUTO: 30.9 PG (ref 26–35)
MCHC RBC AUTO-ENTMCNC: 33.3 % (ref 32–34.5)
MCV RBC AUTO: 92.6 FL (ref 80–99.9)
METER GLUCOSE: 229 MG/DL (ref 74–99)
METHB: 0.3 % (ref 0–1.5)
MODE: ABNORMAL
MONOCYTES ABSOLUTE: 0.45 E9/L (ref 0.1–0.95)
MONOCYTES RELATIVE PERCENT: 5.9 % (ref 2–12)
NEUTROPHILS ABSOLUTE: 5.7 E9/L (ref 1.8–7.3)
NEUTROPHILS RELATIVE PERCENT: 75 % (ref 43–80)
O2 CONTENT: 19.7 ML/DL
O2 SATURATION: 91.7 % (ref 92–98.5)
O2HB: 89.8 % (ref 94–97)
OPERATOR ID: 420
PATIENT TEMP: 37 C
PCO2: 29 MMHG (ref 35–45)
PDW BLD-RTO: 16.2 FL (ref 11.5–15)
PH BLOOD GAS: 7.43 (ref 7.35–7.45)
PLATELET # BLD: 219 E9/L (ref 130–450)
PMV BLD AUTO: 9.8 FL (ref 7–12)
PO2: 60.5 MMHG (ref 75–100)
POTASSIUM REFLEX MAGNESIUM: 3.3 MMOL/L (ref 3.5–5)
PRO-BNP: 3486 PG/ML (ref 0–125)
RBC # BLD: 5.25 E12/L (ref 3.8–5.8)
REASON FOR REJECTION: NORMAL
REJECTED TEST: NORMAL
SARS-COV-2, NAAT: NOT DETECTED
SODIUM BLD-SCNC: 139 MMOL/L (ref 132–146)
SOURCE, BLOOD GAS: ABNORMAL
THB: 15.6 G/DL (ref 11.5–16.5)
TIME ANALYZED: 1617
TOTAL PROTEIN: 6.4 G/DL (ref 6.4–8.3)
TROPONIN, HIGH SENSITIVITY: <6 NG/L (ref 0–11)
WBC # BLD: 7.6 E9/L (ref 4.5–11.5)

## 2022-04-10 PROCEDURE — 85025 COMPLETE CBC W/AUTO DIFF WBC: CPT

## 2022-04-10 PROCEDURE — 83880 ASSAY OF NATRIURETIC PEPTIDE: CPT

## 2022-04-10 PROCEDURE — 82962 GLUCOSE BLOOD TEST: CPT

## 2022-04-10 PROCEDURE — 36415 COLL VENOUS BLD VENIPUNCTURE: CPT

## 2022-04-10 PROCEDURE — 84484 ASSAY OF TROPONIN QUANT: CPT

## 2022-04-10 PROCEDURE — 87635 SARS-COV-2 COVID-19 AMP PRB: CPT

## 2022-04-10 PROCEDURE — 6370000000 HC RX 637 (ALT 250 FOR IP): Performed by: INTERNAL MEDICINE

## 2022-04-10 PROCEDURE — 71045 X-RAY EXAM CHEST 1 VIEW: CPT

## 2022-04-10 PROCEDURE — 6360000002 HC RX W HCPCS: Performed by: EMERGENCY MEDICINE

## 2022-04-10 PROCEDURE — 83735 ASSAY OF MAGNESIUM: CPT

## 2022-04-10 PROCEDURE — 94640 AIRWAY INHALATION TREATMENT: CPT

## 2022-04-10 PROCEDURE — 96374 THER/PROPH/DIAG INJ IV PUSH: CPT

## 2022-04-10 PROCEDURE — 2060000000 HC ICU INTERMEDIATE R&B

## 2022-04-10 PROCEDURE — 80053 COMPREHEN METABOLIC PANEL: CPT

## 2022-04-10 PROCEDURE — 6370000000 HC RX 637 (ALT 250 FOR IP): Performed by: STUDENT IN AN ORGANIZED HEALTH CARE EDUCATION/TRAINING PROGRAM

## 2022-04-10 PROCEDURE — 93005 ELECTROCARDIOGRAM TRACING: CPT | Performed by: STUDENT IN AN ORGANIZED HEALTH CARE EDUCATION/TRAINING PROGRAM

## 2022-04-10 PROCEDURE — 99284 EMERGENCY DEPT VISIT MOD MDM: CPT

## 2022-04-10 PROCEDURE — 82805 BLOOD GASES W/O2 SATURATION: CPT

## 2022-04-10 PROCEDURE — 94664 DEMO&/EVAL PT USE INHALER: CPT

## 2022-04-10 PROCEDURE — 6370000000 HC RX 637 (ALT 250 FOR IP): Performed by: EMERGENCY MEDICINE

## 2022-04-10 RX ORDER — ARFORMOTEROL TARTRATE 15 UG/2ML
15 SOLUTION RESPIRATORY (INHALATION) 2 TIMES DAILY
Status: DISCONTINUED | OUTPATIENT
Start: 2022-04-10 | End: 2022-04-14 | Stop reason: HOSPADM

## 2022-04-10 RX ORDER — METHYLPREDNISOLONE SODIUM SUCCINATE 125 MG/2ML
60 INJECTION, POWDER, LYOPHILIZED, FOR SOLUTION INTRAMUSCULAR; INTRAVENOUS EVERY 8 HOURS
Status: DISCONTINUED | OUTPATIENT
Start: 2022-04-11 | End: 2022-04-12

## 2022-04-10 RX ORDER — FERROUS SULFATE 325(65) MG
325 TABLET ORAL 2 TIMES DAILY WITH MEALS
Status: DISCONTINUED | OUTPATIENT
Start: 2022-04-11 | End: 2022-04-14 | Stop reason: HOSPADM

## 2022-04-10 RX ORDER — METHYLPREDNISOLONE SODIUM SUCCINATE 125 MG/2ML
60 INJECTION, POWDER, LYOPHILIZED, FOR SOLUTION INTRAMUSCULAR; INTRAVENOUS ONCE
Status: COMPLETED | OUTPATIENT
Start: 2022-04-10 | End: 2022-04-10

## 2022-04-10 RX ORDER — TORSEMIDE 10 MG/1
10 TABLET ORAL
Status: DISCONTINUED | OUTPATIENT
Start: 2022-04-11 | End: 2022-04-14 | Stop reason: HOSPADM

## 2022-04-10 RX ORDER — GUAIFENESIN/DEXTROMETHORPHAN 100-10MG/5
5 SYRUP ORAL 4 TIMES DAILY PRN
Status: DISCONTINUED | OUTPATIENT
Start: 2022-04-10 | End: 2022-04-14 | Stop reason: HOSPADM

## 2022-04-10 RX ORDER — POTASSIUM CHLORIDE 20 MEQ/1
40 TABLET, EXTENDED RELEASE ORAL ONCE
Status: COMPLETED | OUTPATIENT
Start: 2022-04-10 | End: 2022-04-10

## 2022-04-10 RX ORDER — PANTOPRAZOLE SODIUM 40 MG/1
40 TABLET, DELAYED RELEASE ORAL
Status: DISCONTINUED | OUTPATIENT
Start: 2022-04-11 | End: 2022-04-14 | Stop reason: HOSPADM

## 2022-04-10 RX ORDER — ONDANSETRON 2 MG/ML
4 INJECTION INTRAMUSCULAR; INTRAVENOUS EVERY 6 HOURS PRN
Status: DISCONTINUED | OUTPATIENT
Start: 2022-04-10 | End: 2022-04-14 | Stop reason: HOSPADM

## 2022-04-10 RX ORDER — POTASSIUM CHLORIDE 20 MEQ/1
40 TABLET, EXTENDED RELEASE ORAL PRN
Status: DISCONTINUED | OUTPATIENT
Start: 2022-04-10 | End: 2022-04-14 | Stop reason: HOSPADM

## 2022-04-10 RX ORDER — AZITHROMYCIN 250 MG/1
500 TABLET, FILM COATED ORAL EVERY OTHER DAY
COMMUNITY

## 2022-04-10 RX ORDER — DEXTROSE MONOHYDRATE 25 G/50ML
12.5 INJECTION, SOLUTION INTRAVENOUS PRN
Status: DISCONTINUED | OUTPATIENT
Start: 2022-04-10 | End: 2022-04-10 | Stop reason: CLARIF

## 2022-04-10 RX ORDER — OXYCODONE AND ACETAMINOPHEN 7.5; 325 MG/1; MG/1
1 TABLET ORAL 2 TIMES DAILY PRN
Status: DISCONTINUED | OUTPATIENT
Start: 2022-04-10 | End: 2022-04-10 | Stop reason: CLARIF

## 2022-04-10 RX ORDER — OXYCODONE HYDROCHLORIDE AND ACETAMINOPHEN 5; 325 MG/1; MG/1
1 TABLET ORAL 2 TIMES DAILY PRN
Status: DISCONTINUED | OUTPATIENT
Start: 2022-04-10 | End: 2022-04-14 | Stop reason: HOSPADM

## 2022-04-10 RX ORDER — POTASSIUM CHLORIDE 7.45 MG/ML
10 INJECTION INTRAVENOUS PRN
Status: DISCONTINUED | OUTPATIENT
Start: 2022-04-10 | End: 2022-04-14 | Stop reason: HOSPADM

## 2022-04-10 RX ORDER — BENZONATATE 100 MG/1
100 CAPSULE ORAL 3 TIMES DAILY PRN
Status: DISCONTINUED | OUTPATIENT
Start: 2022-04-10 | End: 2022-04-14 | Stop reason: HOSPADM

## 2022-04-10 RX ORDER — IPRATROPIUM BROMIDE AND ALBUTEROL SULFATE 2.5; .5 MG/3ML; MG/3ML
3 SOLUTION RESPIRATORY (INHALATION) ONCE
Status: COMPLETED | OUTPATIENT
Start: 2022-04-10 | End: 2022-04-10

## 2022-04-10 RX ORDER — BUDESONIDE 0.25 MG/2ML
250 INHALANT ORAL 2 TIMES DAILY
Status: DISCONTINUED | OUTPATIENT
Start: 2022-04-10 | End: 2022-04-11

## 2022-04-10 RX ORDER — OXYCODONE HYDROCHLORIDE 5 MG/1
2.5 TABLET ORAL 2 TIMES DAILY PRN
Status: DISCONTINUED | OUTPATIENT
Start: 2022-04-10 | End: 2022-04-14 | Stop reason: HOSPADM

## 2022-04-10 RX ORDER — DEXTROSE MONOHYDRATE 50 MG/ML
100 INJECTION, SOLUTION INTRAVENOUS PRN
Status: DISCONTINUED | OUTPATIENT
Start: 2022-04-10 | End: 2022-04-14 | Stop reason: HOSPADM

## 2022-04-10 RX ORDER — IPRATROPIUM BROMIDE AND ALBUTEROL SULFATE 2.5; .5 MG/3ML; MG/3ML
1 SOLUTION RESPIRATORY (INHALATION)
Status: DISCONTINUED | OUTPATIENT
Start: 2022-04-11 | End: 2022-04-14 | Stop reason: HOSPADM

## 2022-04-10 RX ORDER — ACETAMINOPHEN 325 MG/1
650 TABLET ORAL EVERY 4 HOURS PRN
Status: DISCONTINUED | OUTPATIENT
Start: 2022-04-10 | End: 2022-04-14 | Stop reason: HOSPADM

## 2022-04-10 RX ORDER — NICOTINE POLACRILEX 4 MG
15 LOZENGE BUCCAL PRN
Status: DISCONTINUED | OUTPATIENT
Start: 2022-04-10 | End: 2022-04-10 | Stop reason: CLARIF

## 2022-04-10 RX ORDER — ETODOLAC 500 MG/1
500 TABLET, EXTENDED RELEASE ORAL DAILY
Status: ON HOLD | COMMUNITY
Start: 2022-04-10 | End: 2022-04-14 | Stop reason: HOSPADM

## 2022-04-10 RX ADMIN — METHYLPREDNISOLONE SODIUM SUCCINATE 60 MG: 125 INJECTION, POWDER, LYOPHILIZED, FOR SOLUTION INTRAMUSCULAR; INTRAVENOUS at 16:25

## 2022-04-10 RX ADMIN — POTASSIUM CHLORIDE 40 MEQ: 20 TABLET, EXTENDED RELEASE ORAL at 18:46

## 2022-04-10 RX ADMIN — INSULIN LISPRO 1 UNITS: 100 INJECTION, SOLUTION INTRAVENOUS; SUBCUTANEOUS at 23:13

## 2022-04-10 RX ADMIN — IPRATROPIUM BROMIDE AND ALBUTEROL SULFATE 3 AMPULE: .5; 3 SOLUTION RESPIRATORY (INHALATION) at 13:30

## 2022-04-10 ASSESSMENT — PAIN SCALES - GENERAL
PAINLEVEL_OUTOF10: 0
PAINLEVEL_OUTOF10: 0

## 2022-04-10 ASSESSMENT — ENCOUNTER SYMPTOMS
DIARRHEA: 0
PHOTOPHOBIA: 0
VOMITING: 0
NAUSEA: 0
SHORTNESS OF BREATH: 1
TROUBLE SWALLOWING: 0
VOICE CHANGE: 0
ABDOMINAL PAIN: 0
COUGH: 0

## 2022-04-10 NOTE — Clinical Note
Discharge Plan[de-identified] Other/[facility])   Telemetry/Cardiac Monitoring Required?: Yes

## 2022-04-10 NOTE — TELEPHONE ENCOUNTER
Writer contacted Dr. Steve Rodriguez to inform of 30 day readmission risk. Writer's attempt to contact Dr. Steve Rodriguez was unsuccessful.      Call Back: If you need to call back to inform of disposition you can contact me at [phone]

## 2022-04-10 NOTE — ED PROVIDER NOTES
HPI   Patient is a 72-year-old male with medical history of COPD on chronic oxygen at 4 L mainly at night, emphysema and MAC presenting the emergency department due to worsening shortness of breath. Patient states that his symptoms have been ongoing and worsening since yesterday. Patient denies any inciting factors for his symptoms. Patient noting that he was walking around yesterday with his portable oxygen tank and was having difficulty with shortness of breath. Severe worse with exertion. Patient noting vague chest heaviness. He states that this is constant, nonradiating and localized to his mid chest.  It is mild to moderate in severity. No remitting or exacerbating factors noted. Patient denies any recent sick contacts however he was recently hospitalized 2 weeks ago for similar complaints. Patient was hospitalized for several days for acute on chronic exacerbation of COPD. Patient was seen by Dr. Paula Alcaraz during his admission, who is also his pulmonologist.  He is noting that he has had increased oxygen use over the past day. Patient requiring 5 L but still short of breath. On arrival, he appears in mild to moderate acute respiratory distress. Patient has noted increased work of breathing but no accessory muscle use. Patient saturations at 87 to 88% with adequate waveform on 10 L via high flow nasal cannula. Plan to assess the patient for COPD versus CHF exacerbation or viral illness. Patient denying any fever, chills, cough, sore throat, nausea, vomiting, headache, lightheadedness, syncope, urinary symptoms, constipation or diarrhea. Review of Systems   Constitutional: Negative for chills and fever. HENT: Negative for trouble swallowing and voice change. Eyes: Negative for photophobia and visual disturbance. Respiratory: Positive for shortness of breath. Negative for cough. Cardiovascular: Positive for chest pain. Negative for palpitations.    Gastrointestinal: Negative for abdominal pain, diarrhea, nausea and vomiting. Genitourinary: Negative for dysuria. Musculoskeletal: Negative for arthralgias. Skin: Negative for rash and wound. Neurological: Negative for dizziness and headaches. Psychiatric/Behavioral: Negative for behavioral problems and confusion. Physical Exam  Constitutional:       General: He is not in acute distress. Appearance: Normal appearance. He is not ill-appearing. HENT:      Head: Normocephalic and atraumatic. Mouth/Throat:      Mouth: Mucous membranes are moist.      Pharynx: Oropharynx is clear. Eyes:      Pupils: Pupils are equal, round, and reactive to light. Cardiovascular:      Rate and Rhythm: Normal rate and regular rhythm. Pulses: Normal pulses. Heart sounds: Normal heart sounds. Pulmonary:      Effort: Respiratory distress present. Breath sounds: Decreased breath sounds present. No wheezing, rhonchi or rales. Comments: Patient in mild to moderate acute respiratory distress. Appears to have mild increased work of breathing with no accessory muscle use noted. Patient has conversational dyspnea. Decreased breath sounds globally, more prominent in the bases particularly on the left. Abdominal:      Tenderness: There is no abdominal tenderness. There is no guarding or rebound. Musculoskeletal:      Cervical back: Normal range of motion and neck supple. Right lower leg: No edema. Left lower leg: No edema. Skin:     General: Skin is warm and dry. Capillary Refill: Capillary refill takes less than 2 seconds. Neurological:      General: No focal deficit present. Mental Status: He is alert and oriented to person, place, and time. Cranial Nerves: No cranial nerve deficit.       Coordination: Coordination normal.   Psychiatric:         Mood and Affect: Mood normal.         Behavior: Behavior normal.          MDM   Patient is a 62-year-old male with past medical history of COPD on chronic oxygen at 4 L mainly at night, emphysema and MAC presenting to ED due to worsening shortness of breath. Of note, patient was recently hospitalized for similar symptoms and discharged home 2 weeks ago. Today, patient returns the emergency department for reevaluation of shortness of breath. He states that all yesterday he increasingly worsening SOB. On initial evaluation, patient was on 10 L via high flow nasal cannula with borderline SpO2 88 to 90%. Patient was noted increased work of breathing but no accessory muscle use. Conversational dyspnea noted as well. Work-up in the emergency department generally unremarkable. Mild hypokalemia repleted orally in the emergency department. Patient had waxing and waning hypoxia in the emergency department. Plan to admit the patient for further work-up and evaluation of acute respiratory failure with hypoxia likely secondary to COPD exacerbation. Patient was given 60 mg IV Solu-Medrol and DuoNeb treatments x3 in the ED. He remained stable. Spoke with admitting physician who accepted the patient for admission. ED Course as of 04/10/22 2315   Sun Apr 10, 2022   1257 HFNC at 10 L with 92% bedside sat. [PP]   1310 Patient is a 72-year-old male with history of COPD on 4 L of oxygen at baseline presenting with shortness of breath and increased oxygen requirements. I reviewed the patient's chart. He was recently admitted for COPD exacerbation on 3/14/2022. His pulmonologist is Dr. Maryuri Lee. He states that he was never able to wean himself back to 4 L upon discharge, and he was increasing it to 5 L at home. He states he has a cough productive of yellow-brown sputum which is baseline and unchanged. He said no documented fevers, chest pain, syncope, leg edema, or calf tenderness. No history of DVT/PE. He is not anticoagulated. On examination, the patient appears uncomfortable but is nontoxic. He has markedly diminished breath sounds but no wheezing.   He does not appear to be fluid overloaded. No leg edema. No abdominal tenderness. Work-up is in process. DuoNebs and steroids ordered. [JA]   1353 EKG: This EKG is signed and interpreted by me. Rate: 76  Rhythm: Sinus  Interpretation: Sinus rhythm, PACs, normal OR interval, normal QTC, T wave inversions in lateral leads appear more prominent  Comparison: changes compared to previous EKG   [JA]   1622 Patient reevaluated. He look significantly better after breathing treatments. Shared decision making with patient and he is uncomfortable with discharge because he was just admitted to the hospital 2 weeks ago. He is afraid that he will return again tomorrow even if his symptoms are improved right now he gets sent home. Plan to admit the patient for acute hypoxic respiratory failure likely secondary to COPD exacerbation. [PP]   4930 Patient signed out to me by Dr. Javon Horvath. He is pending labs that have hemolyzed. He is reportedly on 4 to 5 L of O2 via nasal cannula at home, has history of COPD and follows with Dr. Sheila Amos for pulmonology. Patient apparently initially requiring 10 L O2 nasal cannula, currently requiring 6 L. Pending patient's improvement, lab work, patient may need admission versus discharge home.  [RH]   2005 Patient currently 82-84% spo2 on 8 liter via NC. He did just return from bathroom. Increased to 10 liters, returning slowly to upper 80's.   [RH]   2008 Now at 90% spo2 on 9 liters [RH]   2011 Noted that troponin has not been resent, troponin ordered [RH]      ED Course User Index  [JA] Rosario Shah MD  [PP] Brunilda Tsai DO  [RH] [address], DO        -------------------------------------------- PAST HISTORY ---------------------------------------------  Past Medical History:  has a past medical history of Acute and chronic respiratory failure with hypoxia ([facility] [address] Acute heart failure with preserved ejection fraction ([facility] [address] Acute respiratory disease due to severe acute respiratory syndrome coronavirus 2 (SARS-CoV-2), Acute respiratory failure with hypoxia (HCC), Bullous emphysema (HCC), Chronic back pain, Colon cancer screening, COPD (chronic obstructive pulmonary disease) ([facility] [address] Coronavirus infection, Cough with hemoptysis, 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 Past Surgical History:  has a past surgical history that includes Abscess Drainage (2006); Colonoscopy (11/18/2013); lumbar fusion (03/2019); bronchoscopy (N/A, 6/12/2019); bronchoscopy (N/A, 10/7/2019); Insert Picc Cath, 5/> Yrs (4/10/2021); and bronchoscopy (N/A, 4/12/2021). Social History:  reports that he quit smoking about 7 years ago. His smoking use included cigarettes. He started smoking about 53 years ago. He has a 92.00 pack-year smoking history. He has never used smokeless tobacco. He reports that he does not drink alcohol and does not use drugs. Family History: family history includes Other in his father and mother. The patients home medications have been reviewed. Allergies: Patient has no known allergies.     -------------------------------------------------- RESULTS -------------------------------------------------    LABS:  Results for orders placed or performed during the hospital encounter of 04/10/22   COVID-19, Rapid    Specimen: Nasopharyngeal Swab   Result Value Ref Range    SARS-CoV-2, NAAT Not Detected Not Detected   CBC with Auto Differential   Result Value Ref Range    WBC 7.6 4.5 - 11.5 E9/L    RBC 5.25 3.80 - 5.80 E12/L    Hemoglobin 16.2 12.5 - 16.5 g/dL    Hematocrit 48.6 37.0 - 54.0 %    MCV 92.6 80.0 - 99.9 fL    MCH 30.9 26.0 - 35.0 pg    MCHC 33.3 32.0 - 34.5 %    RDW 16.2 (H) 11.5 - 15.0 fL    Platelets 833 889 - 944 E9/L    MPV 9.8 7.0 - 12.0 fL    Neutrophils % 75.0 43.0 - 80.0 %    Immature Granulocytes % 0.4 0.0 - 5.0 %    Lymphocytes % 16.0 (L) 20.0 - 42.0 %    Monocytes % 5.9 2.0 - 12.0 %    Eosinophils % 2.2 0.0 - 6.0 %    Basophils % 0.5 0.0 - 2.0 %    Neutrophils Absolute 5.70 1.80 - 7.30 E9/L    Immature Granulocytes # 0.03 E9/L    Lymphocytes Absolute 1.22 (L) 1.50 - 4.00 E9/L    Monocytes Absolute 0.45 0.10 - 0.95 E9/L    Eosinophils Absolute 0.17 0.05 - 0.50 E9/L    Basophils Absolute 0.04 0.00 - 0.20 E9/L   Brain Natriuretic Peptide   Result Value Ref Range    Pro-BNP 3,486 (H) 0 - 125 pg/mL   SPECIMEN REJECTION   Result Value Ref Range    Rejected Test TRP5 CMPX     Reason for Rejection see below    Comprehensive Metabolic Panel w/ Reflex to MG   Result Value Ref Range    Sodium 139 132 - 146 mmol/L    Potassium reflex Magnesium 3.3 (L) 3.5 - 5.0 mmol/L    Chloride 104 98 - 107 mmol/L    CO2 23 22 - 29 mmol/L    Anion Gap 12 7 - 16 mmol/L    Glucose 94 74 - 99 mg/dL    BUN 9 6 - 23 mg/dL    CREATININE 0.7 0.7 - 1.2 mg/dL    GFR Non-African American >60 >=60 mL/min/1.73    GFR African American >60     Calcium 9.1 8.6 - 10.2 mg/dL    Total Protein 6.4 6.4 - 8.3 g/dL    Albumin 4.0 3.5 - 5.2 g/dL    Total Bilirubin 0.6 0.0 - 1.2 mg/dL    Alkaline Phosphatase 76 40 - 129 U/L    ALT 13 0 - 40 U/L    AST 16 0 - 39 U/L   Blood Gas, Arterial   Result Value Ref Range    Date Analyzed [id number]     Time Analyzed 1617     Source: Blood Arterial     pH, Blood Gas 7.428 7.350 - 7.450    PCO2 29.0 (L) 35.0 - 45.0 mmHg    PO2 60.5 (L) 75.0 - 100.0 mmHg    HCO3 18.7 (L) 22.0 - 26.0 mmol/L    B.E. -4.1 (L) -3.0 - 3.0 mmol/L    O2 Sat 91.7 (L) 92.0 - 98.5 %    O2Hb 89.8 (L) 94.0 - 97.0 %    COHb 1.8 (H) 0.0 - 1.5 % MetHb 0.3 0.0 - 1.5 %    O2 Content 19.7 mL/dL    HHb 8.1 (H) 0.0 - 5.0 %    tHb (est) 15.6 11.5 - 16.5 g/dL    Mode NC- 6 L     Date Of Collection      Time Collected      Pt Temp 37.0 C     ID [id number]     Lab [id number]     Critical(s) Notified . No Critical Values    Magnesium   Result Value Ref Range    Magnesium 1.8 1.6 - 2.6 mg/dL   Troponin   Result Value Ref Range    Troponin, High Sensitivity <6 0 - 11 ng/L   POCT Glucose   Result Value Ref Range    Meter Glucose 229 (H) 74 - 99 mg/dL   EKG 12 Lead   Result Value Ref Range    Ventricular Rate 76 BPM    Atrial Rate 76 BPM    P-R Interval 174 ms    QRS Duration 86 ms    Q-T Interval 392 ms    QTc Calculation (Bazett) 441 ms    P Axis 110 degrees    R Axis 98 degrees    T Axis 74 degrees       RADIOLOGY:  XR CHEST PORTABLE   Final Result   1. Stable chronic irregular interstitial opacities bilaterally. 2. No focal airspace opacity or pleural effusion. 3. Emphysematous changes. ------------------------- NURSING NOTES AND VITALS REVIEWED ---------------------------  Date / Time Roomed:  4/10/2022 12:49 PM  ED Bed Assignment:  [id number]    The nursing notes within the ED encounter and vital signs as below have been reviewed.      Patient Vitals for the past 24 hrs:   BP Temp Temp src Pulse Resp SpO2 Height Weight   04/10/22 2215 127/86 97.7 °F (36.5 °C) Oral 97 22 93 % 6' (1.829 m) 189 lb 4 oz (85.8 kg)   04/10/22 1923 120/77 -- -- 87 20 90 % -- --   04/10/22 1720 110/74 -- -- 81 16 (!) 88 % -- --   04/10/22 1545 103/67 -- -- 88 -- 92 % -- --   04/10/22 1332 -- -- -- -- -- 93 % -- --   04/10/22 1331 -- -- -- -- -- 93 % -- --   04/10/22 1330 -- -- -- -- -- 93 % -- --   04/10/22 1315 103/70 -- -- 85 -- 93 % -- --   04/10/22 1300 116/76 -- -- 90 -- 93 % -- --   04/10/22 1254 -- -- -- -- -- 91 % -- --   04/10/22 1253 -- -- -- -- -- (!) 88 % -- --   04/10/22 1250 116/76 97.4 °F (36.3 °C) Oral 108 (!) 32 (!) 83 % 6' (1.829 m) 190 lb (86.2 kg) Oxygen Saturation Interpretation: Improved after treatment    ------------------------------------------ PROGRESS NOTES ------------------------------------------  Re-evaluation(s):  See ED course    Counseling:  I have spoken with the patient and discussed todays results, in addition to providing specific details for the plan of care and counseling regarding the diagnosis and prognosis. Their questions are answered at this time and they are agreeable with the plan of admission.    --------------------------------- ADDITIONAL PROVIDER NOTES ---------------------------------  Consultations:  Spoke with admitting physician. Discussed case. They will admit the patient. This patient's ED course included: a personal history and physicial examination, re-evaluation prior to disposition, multiple bedside re-evaluations, IV medications, cardiac monitoring and continuous pulse oximetry    This patient has remained hemodynamically stable during their ED course. Diagnosis:  1. COPD exacerbation ([address]    2. Acute on chronic respiratory failure with hypoxia (HCC)    3. Hypokalemia        Disposition:  Patient's disposition: Admit to telemetry  Patient's condition is stable.            Naif Griffith DO  Resident  04/10/22 5916

## 2022-04-10 NOTE — ED NOTES
Pt up to side of bed to use urinal. spo2 dropped to 84 upon standing.      Michelet Macias RN  04/10/22 7908

## 2022-04-11 LAB
ADENOVIRUS BY PCR: NOT DETECTED
ALBUMIN SERPL-MCNC: 4 G/DL (ref 3.5–5.2)
ALP BLD-CCNC: 77 U/L (ref 40–129)
ALT SERPL-CCNC: 13 U/L (ref 0–40)
ANION GAP SERPL CALCULATED.3IONS-SCNC: 14 MMOL/L (ref 7–16)
ANISOCYTOSIS: ABNORMAL
AST SERPL-CCNC: 16 U/L (ref 0–39)
BASOPHILS ABSOLUTE: 0 E9/L (ref 0–0.2)
BASOPHILS RELATIVE PERCENT: 0 % (ref 0–2)
BILIRUB SERPL-MCNC: 0.5 MG/DL (ref 0–1.2)
BILIRUBIN DIRECT: <0.2 MG/DL (ref 0–0.3)
BILIRUBIN, INDIRECT: NORMAL MG/DL (ref 0–1)
BORDETELLA PARAPERTUSSIS BY PCR: NOT DETECTED
BORDETELLA PERTUSSIS BY PCR: NOT DETECTED
BUN BLDV-MCNC: 9 MG/DL (ref 6–23)
C-REACTIVE PROTEIN: 0.8 MG/DL (ref 0–0.4)
CALCIUM IONIZED: 1.3 MMOL/L (ref 1.15–1.33)
CALCIUM SERPL-MCNC: 9.4 MG/DL (ref 8.6–10.2)
CHLAMYDOPHILIA PNEUMONIAE BY PCR: NOT DETECTED
CHLORIDE BLD-SCNC: 103 MMOL/L (ref 98–107)
CHOLESTEROL, TOTAL: 147 MG/DL (ref 0–199)
CO2: 21 MMOL/L (ref 22–29)
CORONAVIRUS 229E BY PCR: NOT DETECTED
CORONAVIRUS HKU1 BY PCR: NOT DETECTED
CORONAVIRUS NL63 BY PCR: NOT DETECTED
CORONAVIRUS OC43 BY PCR: NOT DETECTED
CREAT SERPL-MCNC: 0.7 MG/DL (ref 0.7–1.2)
D DIMER: 241 NG/ML DDU
EKG ATRIAL RATE: 76 BPM
EKG P AXIS: 110 DEGREES
EKG P-R INTERVAL: 174 MS
EKG Q-T INTERVAL: 392 MS
EKG QRS DURATION: 86 MS
EKG QTC CALCULATION (BAZETT): 441 MS
EKG R AXIS: 98 DEGREES
EKG T AXIS: 74 DEGREES
EKG VENTRICULAR RATE: 76 BPM
EOSINOPHILS ABSOLUTE: 0 E9/L (ref 0.05–0.5)
EOSINOPHILS RELATIVE PERCENT: 0 % (ref 0–6)
FERRITIN: 144 NG/ML
FOLATE: 13.1 NG/ML (ref 4.8–24.2)
GFR AFRICAN AMERICAN: >60
GFR NON-AFRICAN AMERICAN: >60 ML/MIN/1.73
GLUCOSE BLD-MCNC: 266 MG/DL (ref 74–99)
HBA1C MFR BLD: 5.3 % (ref 4–5.6)
HCT VFR BLD CALC: 47.7 % (ref 37–54)
HDLC SERPL-MCNC: 76 MG/DL
HEMOGLOBIN: 15.9 G/DL (ref 12.5–16.5)
HUMAN METAPNEUMOVIRUS BY PCR: NOT DETECTED
HUMAN RHINOVIRUS/ENTEROVIRUS BY PCR: NOT DETECTED
IMMATURE GRANULOCYTES #: 0.03 E9/L
IMMATURE GRANULOCYTES %: 0.4 % (ref 0–5)
INFLUENZA A BY PCR: NOT DETECTED
INFLUENZA B BY PCR: NOT DETECTED
IRON SATURATION: 20 % (ref 20–55)
IRON: 61 MCG/DL (ref 59–158)
L. PNEUMOPHILA SEROGP 1 UR AG: NORMAL
LACTIC ACID, SEPSIS: 4.4 MMOL/L (ref 0.5–1.9)
LDL CHOLESTEROL CALCULATED: 62 MG/DL (ref 0–99)
LYMPHOCYTES ABSOLUTE: 0.21 E9/L (ref 1.5–4)
LYMPHOCYTES RELATIVE PERCENT: 3 % (ref 20–42)
MAGNESIUM: 1.7 MG/DL (ref 1.6–2.6)
MCH RBC QN AUTO: 30.8 PG (ref 26–35)
MCHC RBC AUTO-ENTMCNC: 33.3 % (ref 32–34.5)
MCV RBC AUTO: 92.4 FL (ref 80–99.9)
METER GLUCOSE: 143 MG/DL (ref 74–99)
METER GLUCOSE: 145 MG/DL (ref 74–99)
METER GLUCOSE: 164 MG/DL (ref 74–99)
METER GLUCOSE: 171 MG/DL (ref 74–99)
MONOCYTES ABSOLUTE: 0.02 E9/L (ref 0.1–0.95)
MONOCYTES RELATIVE PERCENT: 0.3 % (ref 2–12)
MYCOPLASMA PNEUMONIAE BY PCR: NOT DETECTED
NEUTROPHILS ABSOLUTE: 6.8 E9/L (ref 1.8–7.3)
NEUTROPHILS RELATIVE PERCENT: 96.3 % (ref 43–80)
PARAINFLUENZA VIRUS 1 BY PCR: NOT DETECTED
PARAINFLUENZA VIRUS 2 BY PCR: NOT DETECTED
PARAINFLUENZA VIRUS 3 BY PCR: NOT DETECTED
PARAINFLUENZA VIRUS 4 BY PCR: NOT DETECTED
PDW BLD-RTO: 16 FL (ref 11.5–15)
PHOSPHORUS: 1.6 MG/DL (ref 2.5–4.5)
PLATELET # BLD: 212 E9/L (ref 130–450)
PMV BLD AUTO: 9.4 FL (ref 7–12)
POTASSIUM SERPL-SCNC: 4 MMOL/L (ref 3.5–5)
PRO-BNP: 1736 PG/ML (ref 0–125)
PROCALCITONIN: 0.07 NG/ML (ref 0–0.08)
RBC # BLD: 5.16 E12/L (ref 3.8–5.8)
RESPIRATORY SYNCYTIAL VIRUS BY PCR: NOT DETECTED
SARS-COV-2, PCR: NOT DETECTED
SEDIMENTATION RATE, ERYTHROCYTE: 2 MM/HR (ref 0–15)
SODIUM BLD-SCNC: 138 MMOL/L (ref 132–146)
STREP PNEUMONIAE ANTIGEN, URINE: NORMAL
TOTAL CK: 83 U/L (ref 20–200)
TOTAL IRON BINDING CAPACITY: 307 MCG/DL (ref 250–450)
TOTAL PROTEIN: 6.5 G/DL (ref 6.4–8.3)
TRIGL SERPL-MCNC: 43 MG/DL (ref 0–149)
TROPONIN, HIGH SENSITIVITY: <6 NG/L (ref 0–11)
TSH SERPL DL<=0.05 MIU/L-ACNC: 0.57 UIU/ML (ref 0.27–4.2)
VITAMIN B-12: 464 PG/ML (ref 211–946)
VLDLC SERPL CALC-MCNC: 9 MG/DL
WBC # BLD: 7.1 E9/L (ref 4.5–11.5)

## 2022-04-11 PROCEDURE — 83605 ASSAY OF LACTIC ACID: CPT

## 2022-04-11 PROCEDURE — 86140 C-REACTIVE PROTEIN: CPT

## 2022-04-11 PROCEDURE — 80061 LIPID PANEL: CPT

## 2022-04-11 PROCEDURE — 83550 IRON BINDING TEST: CPT

## 2022-04-11 PROCEDURE — 6360000002 HC RX W HCPCS: Performed by: INTERNAL MEDICINE

## 2022-04-11 PROCEDURE — 87077 CULTURE AEROBIC IDENTIFY: CPT

## 2022-04-11 PROCEDURE — 82330 ASSAY OF CALCIUM: CPT

## 2022-04-11 PROCEDURE — 87186 SC STD MICRODIL/AGAR DIL: CPT

## 2022-04-11 PROCEDURE — 85025 COMPLETE CBC W/AUTO DIFF WBC: CPT

## 2022-04-11 PROCEDURE — 2700000000 HC OXYGEN THERAPY PER DAY

## 2022-04-11 PROCEDURE — 87070 CULTURE OTHR SPECIMN AEROBIC: CPT

## 2022-04-11 PROCEDURE — 87205 SMEAR GRAM STAIN: CPT

## 2022-04-11 PROCEDURE — 84100 ASSAY OF PHOSPHORUS: CPT

## 2022-04-11 PROCEDURE — 87206 SMEAR FLUORESCENT/ACID STAI: CPT

## 2022-04-11 PROCEDURE — 82962 GLUCOSE BLOOD TEST: CPT

## 2022-04-11 PROCEDURE — 83036 HEMOGLOBIN GLYCOSYLATED A1C: CPT

## 2022-04-11 PROCEDURE — 6370000000 HC RX 637 (ALT 250 FOR IP): Performed by: INTERNAL MEDICINE

## 2022-04-11 PROCEDURE — 87088 URINE BACTERIA CULTURE: CPT

## 2022-04-11 PROCEDURE — 83540 ASSAY OF IRON: CPT

## 2022-04-11 PROCEDURE — 82746 ASSAY OF FOLIC ACID SERUM: CPT

## 2022-04-11 PROCEDURE — 87449 NOS EACH ORGANISM AG IA: CPT

## 2022-04-11 PROCEDURE — 84145 PROCALCITONIN (PCT): CPT

## 2022-04-11 PROCEDURE — 85651 RBC SED RATE NONAUTOMATED: CPT

## 2022-04-11 PROCEDURE — 84484 ASSAY OF TROPONIN QUANT: CPT

## 2022-04-11 PROCEDURE — 83880 ASSAY OF NATRIURETIC PEPTIDE: CPT

## 2022-04-11 PROCEDURE — 85378 FIBRIN DEGRADE SEMIQUANT: CPT

## 2022-04-11 PROCEDURE — 87040 BLOOD CULTURE FOR BACTERIA: CPT

## 2022-04-11 PROCEDURE — 6370000000 HC RX 637 (ALT 250 FOR IP): Performed by: SPECIALIST

## 2022-04-11 PROCEDURE — 2580000003 HC RX 258: Performed by: INTERNAL MEDICINE

## 2022-04-11 PROCEDURE — 82607 VITAMIN B-12: CPT

## 2022-04-11 PROCEDURE — 36415 COLL VENOUS BLD VENIPUNCTURE: CPT

## 2022-04-11 PROCEDURE — 82728 ASSAY OF FERRITIN: CPT

## 2022-04-11 PROCEDURE — 80076 HEPATIC FUNCTION PANEL: CPT

## 2022-04-11 PROCEDURE — 84443 ASSAY THYROID STIM HORMONE: CPT

## 2022-04-11 PROCEDURE — 0202U NFCT DS 22 TRGT SARS-COV-2: CPT

## 2022-04-11 PROCEDURE — 2060000000 HC ICU INTERMEDIATE R&B

## 2022-04-11 PROCEDURE — 82550 ASSAY OF CK (CPK): CPT

## 2022-04-11 PROCEDURE — 83735 ASSAY OF MAGNESIUM: CPT

## 2022-04-11 PROCEDURE — 80048 BASIC METABOLIC PNL TOTAL CA: CPT

## 2022-04-11 PROCEDURE — 94640 AIRWAY INHALATION TREATMENT: CPT

## 2022-04-11 RX ORDER — 0.9 % SODIUM CHLORIDE 0.9 %
1000 INTRAVENOUS SOLUTION INTRAVENOUS ONCE
Status: COMPLETED | OUTPATIENT
Start: 2022-04-11 | End: 2022-04-11

## 2022-04-11 RX ORDER — BUDESONIDE 0.5 MG/2ML
0.5 INHALANT ORAL 2 TIMES DAILY
Status: DISCONTINUED | OUTPATIENT
Start: 2022-04-11 | End: 2022-04-14 | Stop reason: HOSPADM

## 2022-04-11 RX ORDER — AZITHROMYCIN 250 MG/1
500 TABLET, FILM COATED ORAL
Status: DISCONTINUED | OUTPATIENT
Start: 2022-04-11 | End: 2022-04-14 | Stop reason: HOSPADM

## 2022-04-11 RX ADMIN — METHYLPREDNISOLONE SODIUM SUCCINATE 60 MG: 125 INJECTION, POWDER, LYOPHILIZED, FOR SOLUTION INTRAMUSCULAR; INTRAVENOUS at 19:39

## 2022-04-11 RX ADMIN — INSULIN LISPRO 1 UNITS: 100 INJECTION, SOLUTION INTRAVENOUS; SUBCUTANEOUS at 21:26

## 2022-04-11 RX ADMIN — BUDESONIDE 250 MCG: 0.25 SUSPENSION RESPIRATORY (INHALATION) at 08:10

## 2022-04-11 RX ADMIN — AZITHROMYCIN 500 MG: 250 TABLET, FILM COATED ORAL at 11:27

## 2022-04-11 RX ADMIN — TORSEMIDE 10 MG: 10 TABLET ORAL at 10:12

## 2022-04-11 RX ADMIN — SODIUM CHLORIDE 1000 ML: 9 INJECTION, SOLUTION INTRAVENOUS at 01:42

## 2022-04-11 RX ADMIN — FERROUS SULFATE TAB 325 MG (65 MG ELEMENTAL FE) 325 MG: 325 (65 FE) TAB at 16:33

## 2022-04-11 RX ADMIN — IPRATROPIUM BROMIDE AND ALBUTEROL SULFATE 1 AMPULE: 2.5; .5 SOLUTION RESPIRATORY (INHALATION) at 12:06

## 2022-04-11 RX ADMIN — IPRATROPIUM BROMIDE AND ALBUTEROL SULFATE 1 AMPULE: 2.5; .5 SOLUTION RESPIRATORY (INHALATION) at 15:52

## 2022-04-11 RX ADMIN — METHYLPREDNISOLONE SODIUM SUCCINATE 60 MG: 125 INJECTION, POWDER, LYOPHILIZED, FOR SOLUTION INTRAMUSCULAR; INTRAVENOUS at 03:11

## 2022-04-11 RX ADMIN — ARFORMOTEROL TARTRATE 15 MCG: 15 SOLUTION RESPIRATORY (INHALATION) at 19:28

## 2022-04-11 RX ADMIN — ARFORMOTEROL TARTRATE 15 MCG: 15 SOLUTION RESPIRATORY (INHALATION) at 08:10

## 2022-04-11 RX ADMIN — IPRATROPIUM BROMIDE AND ALBUTEROL SULFATE 1 AMPULE: 2.5; .5 SOLUTION RESPIRATORY (INHALATION) at 08:10

## 2022-04-11 RX ADMIN — IPRATROPIUM BROMIDE AND ALBUTEROL SULFATE 1 AMPULE: 2.5; .5 SOLUTION RESPIRATORY (INHALATION) at 19:28

## 2022-04-11 RX ADMIN — FERROUS SULFATE TAB 325 MG (65 MG ELEMENTAL FE) 325 MG: 325 (65 FE) TAB at 10:12

## 2022-04-11 RX ADMIN — ENOXAPARIN SODIUM 40 MG: 40 INJECTION SUBCUTANEOUS at 10:12

## 2022-04-11 RX ADMIN — METHYLPREDNISOLONE SODIUM SUCCINATE 60 MG: 125 INJECTION, POWDER, LYOPHILIZED, FOR SOLUTION INTRAMUSCULAR; INTRAVENOUS at 11:27

## 2022-04-11 RX ADMIN — PANTOPRAZOLE SODIUM 40 MG: 40 TABLET, DELAYED RELEASE ORAL at 05:45

## 2022-04-11 RX ADMIN — INSULIN LISPRO 1 UNITS: 100 INJECTION, SOLUTION INTRAVENOUS; SUBCUTANEOUS at 11:27

## 2022-04-11 RX ADMIN — BUDESONIDE 500 MCG: 0.5 SUSPENSION RESPIRATORY (INHALATION) at 19:28

## 2022-04-11 ASSESSMENT — PAIN SCALES - GENERAL
PAINLEVEL_OUTOF10: 0
PAINLEVEL_OUTOF10: 0

## 2022-04-11 NOTE — CARE COORDINATION
Resp panel pending. Spoke w/ patient. Explained role of  and plan of care. Lives w/ his wife in a 1 story house- 2 steps to entrance. Currently requiring O2 8 liters high flow NC- wears home O2 provided by [facility]-last order is for O2 3.5 liters- will need O2 testing/ order prior to discharge and notify [facility] DME- pt has portable concentrator, home nebulizer. PCP is Dr. Lex Robles. On iv steroid. Per pt, plan is [facility] on discharge[facility] Awaiting PT/OT evals.  Will follow Zac Degroot, RN case manager

## 2022-04-11 NOTE — PROGRESS NOTES
Occupational Therapy      Occupational therapy referral received. Spoke with patient, explained reason for visit. Patient reports he has been walking in the room and to/from bathroom. Feel he is able to complete his own self care. He is aware of taking restbreaks when needed and managing O2 line.  He has his wife at home to assist him if needed  At this time, feels no need for OT services  OT order discontinued

## 2022-04-11 NOTE — CONSULTS
[address] [place] [address] [facility]  Consultation Note     Admit Date: 4/10/2022 12:49 PM    Reason for Consult:   Mycobacterium avium complex    Attending Physician:  Teresita Salter [address] [place] [address]    HISTORY OF PRESENT ILLNESS:             The history is obtained from extensive review of available past medical records. The patient is a 73 y.o. male who is previously known to the ID service. 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 He has been treated with Ethambutol, Rifampin and Moxifloxacin (this was switched over from Clarithromycin) for over 2 years. In spite of this respiratory culture did not turn negative. He was offered inhaled Amikacin (Arikayce) but he could not afford the cost.  He has been on chronic suppressive Azithromycin 3 times a week. The patient presents to the ED at [facility] on 4/10/2022 complaining of shortness of breath and chest discomfort. He was afebrile but tachypneic and had a pulse oximetry of 83%. White count was normal.  Chest x-ray showed a stable appearance of interstitial infiltrates. He was treated with steroids and is doing somewhat better.     Past Medical History:        Diagnosis Date    Acute and chronic respiratory failure with hypoxia ([address] 10/12/2017    Acute heart failure with preserved ejection fraction ([address] 4/8/2021    Acute respiratory disease due to severe acute respiratory syndrome coronavirus 2 (SARS-CoV-2) 01/01/2021    Acute respiratory failure with hypoxia (HCC) 11/12/2018    Bullous emphysema ([address] 11/12/2018    Chronic back pain     Degeneration of umbar intervertebral disc    Colon cancer screening     COPD (chronic obstructive pulmonary disease) ([address]     Coronavirus infection 2/2/2022    Coronavirus 229E    Cough with hemoptysis 04/23/2019    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 ([address] 08/15/2019    First seen by ID; treatment started 9/4/2019    Emphysema lung ([facility]     Glucose intolerance 06/10/2019    Hilar adenopathy 06/10/2019    Hypophosphatemia 06/10/2019    Immunocompromised ([facility] 2/3/2022    Infection due to parainfluenza virus 3 06/10/2019    Iron deficiency 2/3/2022    Left upper lobe pneumonia 10/12/2017    Pleural effusion on right 10/03/2019    Pulmonary emphysema with fibrosis of lung ([facility] 11/15/2018    Pulmonary Mycobacterium avium complex (MAC) infection [facility] 07/12/2019    BAL results finally completed this date    Rhinovirus infection 10/16/2020    Right lower lobe pneumonia 11/15/2018    Recurrent 4/23/19    S/P lumbar fusion 06/01/2019    Thoracic ascending aortic aneurysm ([facility] 11/15/2018    Proximal ascending aorta; 3.8 cm; 11/15/18     February 2022. Admitted to [facility] with cough, shortness of breath. He was treated with Cefepime and Doxycycline. He tested positive for coronavirus OC43. seen by ID. Azithromycin suppressive therapy was resumed for MAC infection of the lung. IgG was slightly low. The rest of the immunoglobulins were normal.  He was challenged with a pneumococcal and tetanus vaccine and had a robust response. April 2021. Admitted to [facility] with fever, shortness of breath. Treated with Ciprofloxacin and admitted for pneumonia. Seen by ID. Cefepime was added to Ciprofloxacin. He was on Ethambutol, Rifampin and Moxifloxacin. Moxifloxacin was held. Antibiotics were changed to Meropenem, inhaled Tobramycin and oral Ciprofloxacin. He was discharged to [facility] where he completed IV and inhaled antibiotics. Seen in the office in follow-up. Ethambutol, Rifampin and Moxifloxacin were resumed. He was offered Arikayce but he declined because of out-of-pocket expenses.   After 2 years of treatment for MAC, antibiotics were changed over to Azithromycin 500 mg p.o. 3 times daily W.     January 2021.  Admitted to [facility] with uncontrollable shaking chills, fever of 104 °F, cough, fatigue, diarrhea and dyspnea.  He tested positive for SARS-CoV-2.  Treated with Remdesivir.  He developed a productive cough.  Respiratory culture was ordered and he was treated with Cefepime.  He was discharged on Cefuroxime.  After the discharge respiratory cultures came back with Raúl Gonzalez was seen in the office in follow-up and actually was doing well.  His repeat [employer] culture came back positive for Mycobacterium avium complex.  Clarithromycin was switched over to Moxifloxacin.  He continued on B: Rifampin. Bang Hogue was offered Arikayce inhalation but it was too expensive so he never took it.     October 2020.  Admitted to [facility] with chest discomfort, rhinorrhea, sore throat and a low-grade fever.  Tested positive for Rhinovirus/Enterovirus.  Seen by ID and continued Clarithromycin, With ampicillin Rifampin for his MAC infection. Bang Hogue was seen in the office in follow-up on 12/8/2020 and was doing well.  Respiratory culture was ordered.     October 2019.  Admitted to [facility] with fevers, chills, nausea and diarrhea. Bang Hogue had a productive cough.  Seen by Dr. Benson Coreas. Karlyne Slates was discontinued. Ruba Monroyuck a bronchoscopy and was discharged. Hood Palacios in follow-up in the office and was tolerating Ethambutol, Rifampin and Clarithromycin 3 times a week.     June 2019.  Readmitted to [facility] with cough, malaise and a fever of 101.1 °F.  Tested positive for parainfluenza 3 virus.  Treated with Doxycycline.  Also treated for herpes simplex of the nares and lips with oral Acyclovir and discharge.  Seen in follow-up on 7/12/2019.  Respiratory cultures have turned positive for Mycobacterium avium complex and Penicillium.  We requested sensitivities for MAC.  The organism was sensitive to Linezolid, Clarithromycin, Moxifloxacin and Amikacin.  Ethambutol and Rifampin were not tested because of \"inability of susceptibility test to predict outcome\".  We started With albuterol, Rifampin, Clarithromycin and IV Amikacin.  Seen in follow-up on 10/10/2020     May 2019.  Admitted to [facility] with shortness of breath, with fever of 101 °F and chills.  Treated with Vancomycin and Cefepime for a left upper lobe pneumonia.  Seen by ID.  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  This was colonized with MSSA but did not require treatment other than enzymatic debridement. Bradley Garcia was discharged on Levofloxacin.     Past Surgical History:        Procedure Laterality Date    ABSCESS DRAINAGE  2006    X2 RECTAL ABSCESS    BRONCHOSCOPY N/A 6/12/2019    BRONCHOSCOPY BRUSHINGS performed by Julio Villela MD at [address] [place] [address] N/A 10/7/2019    BRONCHOSCOPY DIAGNOSTIC OR CELL [address] ONLY performed by Julio Villela MD at [address] [place] [address] N/A 4/12/2021    BRONCHOSCOPY performed by Julio Villela MD at [facility] COLONOSCOPY  11/18/2013    HC INSERT PICC CATH, 5/> YRS  4/10/2021         LUMBAR FUSION  03/2019    [facility]     Current Medications:   Scheduled Meds:   budesonide  0.5 mg Nebulization BID    ferrous sulfate  325 mg Oral BID WC    torsemide  10 mg Oral Once per day on Mon Wed Fri    pantoprazole  40 mg Oral QAM AC    Arformoterol Tartrate  15 mcg Nebulization BID    ipratropium-albuterol  1 ampule Inhalation Q4H WA    methylPREDNISolone  60 mg IntraVENous Q8H    enoxaparin  40 mg SubCUTAneous Daily    insulin lispro  0-6 Units SubCUTAneous TID WC    insulin lispro  0-3 Units SubCUTAneous Nightly     Continuous Infusions:   dextrose       PRN Meds:benzonatate, guaiFENesin-dextromethorphan, ondansetron, potassium chloride **OR** potassium alternative oral replacement **OR** potassium chloride, acetaminophen, glucagon (rDNA), dextrose, dextrose bolus (hypoglycemia) **OR** dextrose bolus (hypoglycemia), glucose, oxyCODONE-acetaminophen **AND** oxyCODONE    Allergies:  Patient has no known allergies. Social History:   Social History     Socioeconomic History    Marital status:      Spouse name: None    Number of children: None    Years of education: None    Highest education level: None   Occupational History    None   Tobacco Use    Smoking status: Former Smoker     Packs/day: 2.00     Years: 46.00     Pack years: 92.00     Types: Cigarettes     Start date: 10/12/1968     Quit date: 10/12/2014     Years since quittin.5    Smokeless tobacco: Never Used   Vaping Use    Vaping Use: Never used   Substance and Sexual Activity    Alcohol use: No    Drug use: No    Sexual activity: Not Currently     Partners: Female   Other Topics Concern    None   Social History Narrative    None     Social Determinants of Health     Financial Resource Strain: Low Risk     Difficulty of Paying Living Expenses: Not hard at all   Food Insecurity: No Food Insecurity    Worried About Running Out of Food in the Last Year: Never true    Tess of Food in the Last Year: Never true   Transportation Needs:     Lack of Transportation (Medical): Not on file    Lack of Transportation (Non-Medical):  Not on file   Physical Activity:     Days of Exercise per Week: Not on file    Minutes of Exercise per Session: Not on file   Stress:     Feeling of Stress : Not on file   Social Connections:     Frequency of Communication with Friends and Family: Not on file    Frequency of Social Gatherings with Friends and Family: Not on file    Attends [employer] Services: Not on file    Active Member of Clubs or Organizations: Not on file    Attends Club or Organization Meetings: Not on file    Marital Status: Not on file   Intimate Partner Violence:     Fear of Current or Ex-Partner: Not on file    Emotionally Abused: Not on file    Physically Abused: Not on file    Sexually Abused: Not on file   Housing Stability:     Unable to Pay for Housing in the Last Year: Not on file    Number of [facility] in the Last Year: Not on file    Unstable Housing in the Last Year: Not on file      Pets: 2 dogs  Travel: None  Patient retired as a     Family History:       Problem Relation Age of Onset    Other Mother          age 80    Other Father          age 80   . Otherwise non-pertinent to the chief complaint. REVIEW OF SYSTEMS:    Constitutional: Negative for fevers, chills, diaphoresis  Neurologic: Negative   Psychiatric: Negative  Rheumatologic: Negative   Endocrine: Negative  Hematologic: Negative  Immunologic: Negative  ENT: Negative  Respiratory: As in the HPI  Cardiovascular: Negative  GI: Negative  : Negative  Musculoskeletal: Negative  Skin: No rashes. PHYSICAL EXAM:    Vitals:   /86   Pulse 97   Temp 97.7 °F (36.5 °C) (Oral)   Resp 22   Ht 6' (1.829 m)   Wt 189 lb 4 oz (85.8 kg)   SpO2 93%   BMI 25.67 kg/m²   Constitutional: The patient is awake, alert, and oriented. Skin: Warm and dry. No rashes were noted. O2 by nasal cannula. HEENT: Eyes show round, and reactive pupils. No jaundice. Moist mucous membranes, no ulcerations, no thrush. Neck: Supple to movements. No lymphadenopathy. Chest: No use of accessory muscles to breathe. Symmetrical expansion. Auscultation reveals no wheezing, crackles, or rhonchi. Cardiovascular: S1 and S2 are rhythmic and regular. No murmurs appreciated. Abdomen: Positive bowel sounds to auscultation. Benign to palpation. No masses felt. No hepatosplenomegaly. Extremities: No clubbing, no cyanosis, no edema. Lines: Peripheral.      CBC+dif:  Recent Labs     04/10/22  1316 04/10/22  1316 22  0025   WBC 7.6  --  7.1   HGB 16.2   < > 15.9   HCT 48.6   < > 47.7   MCV 92.6   < > 92.4      < > 212   NEUTROABS 5.70   < > 6.80    < > = values in this interval not displayed.      Lab Results   Component Value Date    CRP 0.8 (H) 2022    CRP 0.3 2022    CRP 0.3 2022      No results found for: [facility]  Lab Results Component Value Date    SEDRATE 2 04/11/2022    SEDRATE 1 03/18/2022    SEDRATE 3 03/17/2022     Lab Results   Component Value Date    ALT 13 04/11/2022    AST 16 04/11/2022    ALKPHOS 77 04/11/2022    BILITOT 0.5 04/11/2022     Lab Results   Component Value Date     04/11/2022    K 4.0 04/11/2022    K 3.3 04/10/2022     04/11/2022    CO2 21 04/11/2022    BUN 9 04/11/2022    CREATININE 0.7 04/11/2022    GFRAA >60 04/11/2022    LABGLOM >60 04/11/2022    GLUCOSE 266 04/11/2022    PROT 6.5 04/11/2022    LABALBU 4.0 04/11/2022    CALCIUM 9.4 04/11/2022    BILITOT 0.5 04/11/2022    ALKPHOS 77 04/11/2022    AST 16 04/11/2022    ALT 13 04/11/2022       Lab Results   Component Value Date    PROTIME 12.5 05/30/2019    INR 1.1 05/30/2019       Lab Results   Component Value Date    TSH 0.566 04/11/2022       Lab Results   Component Value Date    COLORU Yellow 02/01/2022    PHUR 5.5 02/01/2022    LABCAST FEW 10/02/2019    WBCUA NONE 04/07/2021    RBCUA NONE 04/07/2021    MUCUS Present 04/07/2021    BACTERIA FEW 04/07/2021    CLARITYU Clear 02/01/2022    SPECGRAV 1.020 02/01/2022    LEUKOCYTESUR Negative 02/01/2022    UROBILINOGEN 0.2 02/01/2022    BILIRUBINUR MODERATE 02/01/2022    BLOODU Negative 02/01/2022    GLUCOSEU Negative 02/01/2022       Lab Results   Component Value Date    HCO3 18.7 04/10/2022    BE -4.1 04/10/2022    O2SAT 91.7 04/10/2022    PH 7.428 04/10/2022    PCO2 29.0 04/10/2022    PO2 60.5 04/10/2022     Radiology:  Noted    Microbiology:  Pending  No results for input(s): BC in the last 72 hours. No results for input(s): ORG in the last 72 hours. No results for input(s): [place] Carrel in the last 72 hours. No results for input(s): STREPNEUMAGU in the last 72 hours. No results for input(s): LP1UAG in the last 72 hours. No results for input(s): ASO in the last 72 hours. No results for input(s): CULTRESP in the last 72 hours.   Recent Labs     04/11/22  0700   PROCAL 0.07 Assessment:  · Exacerbation COPD  · MAC infection of the lung. Treated with over 2 years of 3 drugs. Sputum did not sterilize. Currently on suppressive Azithromycin    Plan:    · Continue supportive treatment  · Resume Azithromycin  · Check cultures, baseline ESR, CRP  · Will follow with you    Thank you for having us see this patient in consultation. I will be discussing this case with the treating physicians. Spoke with nursing.     Carson Portillo MD  9:51 AM  4/11/2022

## 2022-04-11 NOTE — H&P
History and Physical      CHIEF COMPLAINT: Worsening shortness of breath    History of Present Illness: 65-year-old male patient of Dr. Omero Pires I am asked admit and follow. History obtained from patient as well as extensive review electronic record. He was seen in pulmonary office 4/5/2022 \"breathing still not good\". Wearing 6 L nasal cannula at night, takes DuoNeb, uses Trelegy but still short of breath. \"Family wanted to go to [facility] for second opinion\". He presented to the ED yesterday with shortness of breath complaints worse with exertion. Upon arrival \"he appears in mild to moderate acute respiratory distress\". Patient saturating at 87-88% with 10 L high flow nasal cannula. He was afebrile on admission. In the ED SPO2 83 on 6 L nasal cannula. Chest x-ray in the ED as follows--    FINDINGS:   Chronic irregular interstitial opacities bilaterally notable in the lung   bases.  Areas of scarring or subsegmental atelectasis also in lung bases. Hyperinflation of both lungs notable in upper lung fields.  No pneumothorax. The heart is normal in size.       Impression:       1. Stable chronic irregular interstitial opacities bilaterally. 2. No focal airspace opacity or pleural effusion. 3. Emphysematous changes.          Patient seen earlier today by pulmonary service, 8 L high flow nasal cannula feeling slightly better minimal sputum production. Increase budesonide to 500 mcg twice daily. ID note from today appreciated; resume azithromycin suppressive dose. He was most recently discharged from this hospital 3/14/2022, with diagnosis of acute exacerbation of COPD. Patient was discharged from this hospital 2/7/2022 due to acute respiratory failure with hypoxia. 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   --Admission to the hospital 4/8/2021 due to acute heart failure with preserved ejection fraction  --Hospitalization 2021 due to acute respiratory failure due to SARS-CoV-2  --Found to have thoracic ascending aortic aneurysm 11/15/2018; proximal ascending aorta 3.8 cm  --Patient admitted 2019 due to parainfluenza virus pneumonia.  The above was extremely slow resolving with left upper lung infiltrate.  He then underwent bronchoscopy with BAL. --Fungal results became available approximately 2019, positive Fungitell. --On 2019 sensitivities were available and ID recommended starting intravenous amikacin 1400 mg IV daily; rifampin 600 mg by mouth daily; clarithromycin 1000 mg/day ethambutol 1200 mg daily by mouth.  --Mother  age 80, father  age 80. --Patient quit smoking , 92-pack-year history  --Patient has been vaccinated for COVID-19 and received booster   --Denies any prior history of rheumatic fever scarlet fever polio diphtheria cancer. Patient is known glucose intolerance and is currently on sliding scale insulin coverage; he is on IV methylprednisolone . --Screening colonoscopy 2013 with polypectomy  --Today, molecular/PCR viral respiratory panel all not detected; Legionella and strep antigens presumptive negative.         Past Medical History:   Diagnosis Date    Acute and chronic respiratory failure with hypoxia ([address] 10/12/2017    Acute heart failure with preserved ejection fraction ([address] 2021    Acute respiratory disease due to severe acute respiratory syndrome coronavirus 2 (SARS-CoV-2) 2021    Acute respiratory failure with hypoxia (HCC) 2018    Bullous emphysema ([address] 2018    Chronic back pain     Degeneration of umbar intervertebral disc    Colon cancer screening     COPD (chronic obstructive pulmonary disease) ([address]     Coronavirus infection 2022    Coronavirus 229E    Cough with hemoptysis 2019    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 ([address] 08/15/2019    First seen by ID; treatment started 2019    [facility]     Glucose intolerance 06/10/2019    Hilar adenopathy 06/10/2019    Hypophosphatemia 06/10/2019    Immunocompromised ([address] 2/3/2022    Infection due to parainfluenza virus 3 06/10/2019    Iron deficiency 2/3/2022    Left upper lobe pneumonia 10/12/2017    Pleural effusion on right 10/03/2019    Pulmonary emphysema with fibrosis of lung ([address] 11/15/2018    Pulmonary Mycobacterium avium complex (MAC) infection ([facility] 07/12/2019    BAL results finally completed this date    Rhinovirus infection 10/16/2020    Right lower lobe pneumonia 11/15/2018    Recurrent 4/23/19    S/P lumbar fusion 06/01/2019    Thoracic ascending aortic aneurysm ([facility] 11/15/2018    Proximal ascending aorta; 3.8 cm; 11/15/18         Past Surgical History:   Procedure Laterality Date    ABSCESS DRAINAGE  2006    X2 RECTAL ABSCESS    BRONCHOSCOPY N/A 6/12/2019    BRONCHOSCOPY BRUSHINGS performed by Isidro Glass MD at [facility] N/A 10/7/2019    BRONCHOSCOPY DIAGNOSTIC OR CELL [address] ONLY performed by Isidro Glass MD at [facility] N/A 4/12/2021    BRONCHOSCOPY performed by Isidro Glass MD at [facility] 22 COLONOSCOPY  11/18/2013    HC INSERT PICC CATH, 5/> YRS  4/10/2021         LUMBAR FUSION  03/2019    [facility]       Medications Prior to Admission:    Medications Prior to Admission: etodolac (LODINE XL) 500 MG extended release tablet, Take 500 mg by mouth daily  azithromycin (ZITHROMAX) 250 MG tablet, Take 500 mg by mouth every other day Indications: Mild Upper Respiratory Infection  budesonide (PULMICORT) 0.5 MG/2ML nebulizer suspension, Take 2 mLs by nebulization 2 times daily (Patient not taking: Reported on 4/10/2022)  formoterol (PERFOROMIST) 20 MCG/2ML nebulizer solution, Take 2 mLs by nebulization 2 times daily  torsemide (DEMADEX) 10 MG tablet, Take 1 tablet by mouth three times a week (Patient not taking: Reported on 4/10/2022)  benzonatate (TESSALON) 100 MG capsule, Take 100 mg by mouth 3 times daily as needed for Cough (Patient not taking: Reported on 4/10/2022)  guaiFENesin-dextromethorphan (ROBITUSSIN DM) 100-10 MG/5ML syrup, Take 5 mLs by mouth 4 times daily as needed for Cough (Patient not taking: Reported on 4/10/2022)  ferrous sulfate (IRON 325) 325 (65 Fe) MG tablet, Take 1 tablet by mouth 2 times daily (with meals)  pantoprazole (PROTONIX) 40 MG tablet, Take 1 tablet by mouth every morning (before breakfast) (Patient not taking: Reported on 4/10/2022)  Probiotic Product (PROBIOTIC-10 PO), Take by mouth daily   Handicap Placard MISC, by Does not apply route Patient cannot walk 200 ft without stopping to rest.   Expiration 5 yrs. Fluticasone-Umeclidin-Vilant (TRELEGY ELLIPTA IN), Inhale 1 puff into the lungs daily (Patient not taking: Reported on 4/10/2022)  ipratropium-albuterol (DUONEB) 0.5-2.5 (3) MG/3ML SOLN nebulizer solution, Inhale 3 mLs into the lungs every 4 hours (while awake)  Respiratory Therapy Supplies (FULL KIT NEBULIZER SET) MISC, Use as directed with nebulized medication. oxyCODONE-acetaminophen (PERCOCET) 7.5-325 MG per tablet, Take 1 tablet by mouth 2 times daily as needed for Pain. Allergies:    Patient has no known allergies. Social History:    reports that he quit smoking about 7 years ago. His smoking use included cigarettes. He started smoking about 53 years ago. He has a 92.00 pack-year smoking history. He has never used smokeless tobacco. He reports that he does not drink alcohol and does not use drugs. Family History:   family history includes Other in his father and mother.     REVIEW OF SYSTEMS:  As above in the HPI, otherwise negative    PHYSICAL EXAM:    VS: /82   Pulse 108   Temp 97.8 °F (36.6 °C) (Oral)   Resp 22   Ht 6' (1.829 m)   Wt 189 lb 4 oz (85.8 kg)   SpO2 98%   BMI 25.67 kg/m²     General appearance: Alert, Awake, Oriented times 3, no distress, nasal cannula oxygen in place  Skin: Warm and dry ; no rashes  Head: Normocephalic. No masses, lesions or tenderness noted  Eyes: Conjunctivae pink, sclera white. PERRL,EOM-I  Ears: External ears normal  Nose/Sinuses: Nares normal. Septum midline. Mucosa normal. No drainage  Oropharynx: Oropharynx clear with no exudate seen  Neck: Supple. No jugular venous distension, lymphadenopathy or thyromegaly Trachea midline  Lungs: Decreased excursion rare wheeze and rhonchi  Heart: S1 S2  Regular rate and rhythm. No rub, murmur or gallop  Abdomen: Soft, non-tender. BS normal. No masses, organomegaly; no rebound or guarding  Extremities: No edema, Peripheral pulses palpable  Musculoskeletal: Muscular strength appears intact. Neuro:  No focal motor defects ; II-XII grossly intact .  GLASER equally  Breast: deferred  Rectal: deferred  Genitalia:  deferred    LABS:  CBC:   Lab Results   Component Value Date    WBC 7.1 04/11/2022    RBC 5.16 04/11/2022    HGB 15.9 04/11/2022    HCT 47.7 04/11/2022    MCV 92.4 04/11/2022    MCH 30.8 04/11/2022    MCHC 33.3 04/11/2022    RDW 16.0 04/11/2022     04/11/2022    MPV 9.4 04/11/2022     CBC with Differential:    Lab Results   Component Value Date    WBC 7.1 04/11/2022    RBC 5.16 04/11/2022    HGB 15.9 04/11/2022    HCT 47.7 04/11/2022     04/11/2022    MCV 92.4 04/11/2022    MCH 30.8 04/11/2022    MCHC 33.3 04/11/2022    RDW 16.0 04/11/2022    NRBC 0.0 03/16/2022    LYMPHOPCT 3.0 04/11/2022    MONOPCT 0.3 04/11/2022    MYELOPCT 0.9 04/07/2021    BASOPCT 0.0 04/11/2022    MONOSABS 0.02 04/11/2022    LYMPHSABS 0.21 04/11/2022    EOSABS 0.00 04/11/2022    BASOSABS 0.00 04/11/2022     Hemoglobin/Hematocrit:    Lab Results   Component Value Date    HGB 15.9 04/11/2022    HCT 47.7 04/11/2022     CMP:    Lab Results   Component Value Date     04/11/2022    K 4.0 04/11/2022    K 3.3 04/10/2022     04/11/2022    CO2 21 04/11/2022    BUN 9 04/11/2022    CREATININE 0.7 04/11/2022    GFRAA >60 04/11/2022    LABGLOM >60 04/11/2022    GLUCOSE 266 04/11/2022    PROT 6.5 04/11/2022    LABALBU 4.0 04/11/2022    CALCIUM 9.4 04/11/2022    BILITOT 0.5 04/11/2022    ALKPHOS 77 04/11/2022    AST 16 04/11/2022    ALT 13 04/11/2022     BMP:    Lab Results   Component Value Date     04/11/2022    K 4.0 04/11/2022    K 3.3 04/10/2022     04/11/2022    CO2 21 04/11/2022    BUN 9 04/11/2022    LABALBU 4.0 04/11/2022    CREATININE 0.7 04/11/2022    CALCIUM 9.4 04/11/2022    GFRAA >60 04/11/2022    LABGLOM >60 04/11/2022    GLUCOSE 266 04/11/2022     Hepatic Function Panel:    Lab Results   Component Value Date    ALKPHOS 77 04/11/2022    ALT 13 04/11/2022    AST 16 04/11/2022    PROT 6.5 04/11/2022    BILITOT 0.5 04/11/2022    BILIDIR <0.2 04/11/2022    IBILI see below 04/11/2022    LABALBU 4.0 04/11/2022     Ionized Calcium:  No results found for: IONCA  Magnesium:    Lab Results   Component Value Date    MG 1.7 04/11/2022     Phosphorus:    Lab Results   Component Value Date    PHOS 1.6 04/11/2022     LDH:    Lab Results   Component Value Date     01/02/2021     Uric Acid:    Lab Results   Component Value Date    LABURIC 4.6 03/15/2022     PT/INR:    Lab Results   Component Value Date    PROTIME 12.5 05/30/2019    INR 1.1 05/30/2019     Warfarin PT/INR:  No components found for: PTPATWAR, PTINRWAR  PTT:    Lab Results   Component Value Date    APTT 31.4 05/30/2019   [APTT}  Troponin:    Lab Results   Component Value Date    TROPONINI <0.01 04/08/2021     Last 3 Troponin:    Lab Results   Component Value Date    TROPONINI <0.01 04/08/2021    TROPONINI <0.01 04/08/2021    TROPONINI <0.01 01/02/2021     U/A:    Lab Results   Component Value Date    COLORU Yellow 02/01/2022    PROTEINU Negative 02/01/2022    PHUR 5.5 02/01/2022    LABCAST FEW 10/02/2019    WBCUA NONE 04/07/2021    RBCUA NONE 04/07/2021    MUCUS Present 04/07/2021    BACTERIA FEW 04/07/2021    CLARITYU Clear 02/01/2022    SPECGRAV 1.020 02/01/2022    LEUKOCYTESUR Negative 02/01/2022    UROBILINOGEN 0.2 02/01/2022    BILIRUBINUR MODERATE 02/01/2022    BLOODU Negative 02/01/2022    GLUCOSEU Negative 02/01/2022     ABG:    Lab Results   Component Value Date    PH 7.428 04/10/2022    PCO2 29.0 04/10/2022    PO2 60.5 04/10/2022    HCO3 18.7 04/10/2022    BE -4.1 04/10/2022    O2SAT 91.7 04/10/2022     HgBA1c:    Lab Results   Component Value Date    LABA1C 5.3 04/11/2022     FLP:    Lab Results   Component Value Date    TRIG 43 04/11/2022    HDL 76 04/11/2022    LDLCALC 62 04/11/2022    LABVLDL 9 04/11/2022     TSH:    Lab Results   Component Value Date    TSH 0.566 04/11/2022     VITAMIN B12: No components found for: B12  FOLATE:    Lab Results   Component Value Date    FOLATE 13.1 04/11/2022     IRON:    Lab Results   Component Value Date    IRON 61 04/11/2022     Iron Saturation:  No components found for: PERCENTFE  TIBC:    Lab Results   Component Value Date    TIBC 307 04/11/2022     FERRITIN:    Lab Results   Component Value Date    FERRITIN 144 04/11/2022       RADIOLOGY:  XR CHEST PORTABLE   Final Result   1. Stable chronic irregular interstitial opacities bilaterally. 2. No focal airspace opacity or pleural effusion. 3. Emphysematous changes.              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 ([facility] [A31.2]     Glucose intolerance [E74.39]     Thoracic ascending aortic aneurysm ([facility] [I71.2]     Acute and chronic respiratory failure with [facility] [J96.21]        PLAN:  Medications discussed with patient  GI prophylaxis  DVT prophylaxis  Consultants notes reviewed  Arformoterol 15 mcg twice daily  Azithromycin 500 mg Monday Wednesday Friday  Budesonide 500 mg twice daily nebulizer  Low-dose sliding scale insulin; fasting glucose 266  Lactic acid elevated 4.4 however proBNP done in the ED was 3486; previously 1632 on 3/15/2022. Today, proBNP 1736. Further IV fluids may be contraindicated at this time.   Methylprednisolone 60 mg IV every 8 hours  DuoNeb every 4 hours while awake  Roxicodone 7.5 mg twice daily as needed  Cardiology consult regarding marked increase in proBNP on admission; he had 2D echo done 3/2022          Please note that over 50 minutes was spent in evaluating the patient, review of records and results, discussion with staff/family, etc.    Juan Cabrera, DO    2:02 PM  4/11/2022    Voice recognition software use for dictation

## 2022-04-11 NOTE — PROGRESS NOTES
Dr Kathryn Baptiste notified of consult via [facility] [facility]. Denise took consult information.  Jerel gonzales

## 2022-04-11 NOTE — PROGRESS NOTES
Notified Dr. Fernanda Olvera of critical lactic level, new order for bolus of 0.9% NS put into chart.

## 2022-04-11 NOTE — CONSULTS
[facility]  [address], [address]  [facility], [address] [place] [address]    Pulmonary Consultation      Reason for Consult:  sob    Requesting Physician:  Sydney Baez MD    CHIEF COMPLAINT:  sob    History Obtained From:  patient    HISTORY OF PRESENT ILLNESS:                The patient is a 73 y.o. male with significant past medical history of MAC and COPD who presents with increased sob. Was just discharged from hospital on 3/18 with COPD exacerbation, claims was feeling ok but gradually got worse with breathing shortly after. Was seen in office on 4/5, started on pulmicort and perforomist if will help, was only able to take for a day until he showed up at hospital feeling much worse with breathing (?) think it did help with breathing. Didn't think cough and sputum production got worse the past few weeks. Suppose to be wearing oxygen all the time, (?) has been staying off oxygen from time to time with activity as claims didn't want to be addicted to it, mention of increasing oxygen use over the past week due to worsening respiratory function. Currently on 8 li HFNC, claims feeling slightly better with breathing, minimal sputum production which is close to baseline, sitting up in bed when seen.     Past Medical History:        Diagnosis Date    Acute and chronic respiratory failure with hypoxia ([address] 10/12/2017    Acute heart failure with preserved ejection fraction ([address] 4/8/2021    Acute respiratory disease due to severe acute respiratory syndrome coronavirus 2 (SARS-CoV-2) 01/01/2021    Acute respiratory failure with hypoxia (HCC) 11/12/2018    Bullous emphysema ([address] 11/12/2018    Chronic back pain     Degeneration of umbar intervertebral disc    Colon cancer screening     COPD (chronic obstructive pulmonary disease) ([address]     Coronavirus infection 2/2/2022    Coronavirus 229E    Cough with hemoptysis 04/23/2019    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 ([facility] 08/15/2019    First seen by ID; treatment started 9/4/2019    Emphysema lung ([facility]     Glucose intolerance 06/10/2019    Hilar adenopathy 06/10/2019    Hypophosphatemia 06/10/2019    Immunocompromised ([address] 2/3/2022    Infection due to parainfluenza virus 3 06/10/2019    Iron deficiency 2/3/2022    Left upper lobe pneumonia 10/12/2017    Pleural effusion on right 10/03/2019    Pulmonary emphysema with fibrosis of lung ([address] 11/15/2018    Pulmonary Mycobacterium avium complex (MAC) infection ([facility] 07/12/2019    BAL results finally completed this date    Rhinovirus infection 10/16/2020    Right lower lobe pneumonia 11/15/2018    Recurrent 4/23/19    S/P lumbar fusion 06/01/2019    Thoracic ascending aortic aneurysm ([facility] 11/15/2018    Proximal ascending aorta; 3.8 cm; 11/15/18       Past Surgical History:        Procedure Laterality Date    ABSCESS DRAINAGE  2006    X2 RECTAL ABSCESS    BRONCHOSCOPY N/A 6/12/2019    BRONCHOSCOPY BRUSHINGS performed by Malathi Alberto MD at [address] [place] [address] N/A 10/7/2019    BRONCHOSCOPY DIAGNOSTIC OR CELL [address] ONLY performed by Malathi Alberto MD at [address] [place] [address] N/A 4/12/2021    BRONCHOSCOPY performed by Malathi Alberto MD at [facility] 22 COLONOSCOPY  11/18/2013    HC INSERT PICC CATH, 5/> YRS  4/10/2021         LUMBAR FUSION  03/2019    [facility]       Current Medications:    Current Facility-Administered Medications: budesonide (PULMICORT) nebulizer suspension 500 mcg, 0.5 mg, Nebulization, BID  benzonatate (TESSALON) capsule 100 mg, 100 mg, Oral, TID PRN  ferrous sulfate (IRON 325) tablet 325 mg, 325 mg, Oral, BID WC  guaiFENesin-dextromethorphan (ROBITUSSIN DM) 100-10 MG/5ML syrup 5 mL, 5 mL, Oral, 4x Daily PRN  torsemide (DEMADEX) tablet 10 mg, 10 mg, Oral, Once per day on Mon Wed Fri  pantoprazole (PROTONIX) tablet 40 mg, 40 mg, Oral, QAM AC  Arformoterol Tartrate (BROVANA) nebulizer solution 15 mcg, 15 mcg, Nebulization, BID  ipratropium-albuterol (DUONEB) nebulizer solution 1 ampule, 1 ampule, Inhalation, Q4H WA  methylPREDNISolone sodium (SOLU-MEDROL) injection 60 mg, 60 mg, IntraVENous, Q8H  enoxaparin (LOVENOX) injection 40 mg, 40 mg, SubCUTAneous, Daily  ondansetron (ZOFRAN) injection 4 mg, 4 mg, IntraVENous, Q6H PRN  potassium chloride (KLOR-CON M) extended release tablet 40 mEq, 40 mEq, Oral, PRN **OR** potassium bicarb-citric acid (EFFER-K) effervescent tablet 40 mEq, 40 mEq, Oral, PRN **OR** potassium chloride 10 mEq/100 mL IVPB (Peripheral Line), 10 mEq, IntraVENous, PRN  acetaminophen (TYLENOL) tablet 650 mg, 650 mg, Oral, Q4H PRN  glucagon (rDNA) injection 1 mg, 1 mg, IntraMUSCular, PRN  dextrose 5 % solution, 100 mL/hr, IntraVENous, PRN  insulin lispro (HUMALOG) injection vial 0-6 Units, 0-6 Units, SubCUTAneous, TID WC  insulin lispro (HUMALOG) injection vial 0-3 Units, 0-3 Units, SubCUTAneous, Nightly  dextrose bolus (hypoglycemia) 10% 125 mL, 125 mL, IntraVENous, PRN **OR** dextrose bolus (hypoglycemia) 10% 250 mL, 250 mL, IntraVENous, PRN  glucose chewable tablet 4 each, 4 each, Oral, PRN  oxyCODONE-acetaminophen (PERCOCET) 5-325 MG per tablet 1 tablet, 1 tablet, Oral, BID PRN **AND** oxyCODONE (ROXICODONE) immediate release tablet 2.5 mg, 2.5 mg, Oral, BID PRN    Allergies:  Patient has no known allergies. Social History:    TOBACCO:   reports that he quit smoking about 7 years ago. His smoking use included cigarettes. He started smoking about 53 years ago. He has a 92.00 pack-year smoking history. He has never used smokeless tobacco.    Family History:       Problem Relation Age of Onset    Other Mother          age 80    Other Father          age 80       REVIEW OF SYSTEMS:    RESPIRATORY:  Sob and cough  Remainder of complete ROS is negative.     PHYSICAL EXAM:      Vitals:    /86   Pulse 97   Temp 97.7 °F (36.5 °C) (Oral)   Resp 22 Ht 6' (1.829 m)   Wt 189 lb 4 oz (85.8 kg)   SpO2 93%   BMI 25.67 kg/m²     EYES:  Lids and lashes normal, pupils equal, round and reactive to light, extra ocular muscles intact, sclera clear, conjunctiva normal  ENT:  Normocephalic, without obvious abnormality, atraumatic, sinuses nontender on palpation, external ears without lesions, oral pharynx with moist mucus membranes, tonsils without erythema or exudates, gums normal and good dentition. NECK:  Supple, symmetrical, trachea midline, no adenopathy, thyroid symmetric, not enlarged and no tenderness, skin normal  LUNGS:  Bilateral ronchi with cough  CARDIOVASCULAR:  Normal apical impulse, regular rate and rhythm, normal S1 and S2, no S3 or S4, and no murmur noted  ABDOMEN:  No scars, normal bowel sounds, soft, non-distended, non-tender, no masses palpated, no hepatosplenomegally  MUSCULOSKELETAL:  There is no redness, warmth, or swelling of the joints. NEUROLOGIC:  Awake, alert, oriented to name, place and time. Cranial nerves II-XII are grossly intact. DATA:    CBC: Recent Labs     04/10/22  1316 04/11/22  0025   WBC 7.6 7.1   HGB 16.2 15.9   HCT 48.6 47.7   MCV 92.6 92.4    212       BMP:  Recent Labs     04/10/22  1705 04/11/22  0025    138   K 3.3* 4.0    103   CO2 23 21*   PHOS  --  1.6*   BUN 9 9   CREATININE 0.7 0.7    ALB:3,BILIDIR:3,BILITOT:3,ALKPHOS:3)@    PT/INR: No results for input(s): PROTIME, INR in the last 72 hours. ABG:   Recent Labs     04/10/22  1617   PH 7.428   PO2 60.5*   PCO2 29.0*   HCO3 18.7*   BE -4.1*   O2SAT 91.7*   METHB 0.3   O2HB 89.8*   COHB 1.8*   O2CON 19.7   HHB 8.1*   THB 15.6             Radiology Review:  CXR reviewed looking similar compared to previous    IMPRESSION/RECOMMENDATIONS:      COPD  MAC  hypoxia    1. Cont with nebs, increase pulmicort to 500 mcg, observe respiratory function  2.  Cont with oxygen, observe saturations, will need to be consistent with oxygen use, (?) suppose to be

## 2022-04-12 LAB
ALBUMIN SERPL-MCNC: 3.9 G/DL (ref 3.5–5.2)
ALP BLD-CCNC: 70 U/L (ref 40–129)
ALT SERPL-CCNC: 10 U/L (ref 0–40)
ANION GAP SERPL CALCULATED.3IONS-SCNC: 11 MMOL/L (ref 7–16)
AST SERPL-CCNC: 10 U/L (ref 0–39)
BASOPHILS ABSOLUTE: 0.01 E9/L (ref 0–0.2)
BASOPHILS RELATIVE PERCENT: 0.1 % (ref 0–2)
BILIRUB SERPL-MCNC: 0.3 MG/DL (ref 0–1.2)
BILIRUBIN DIRECT: <0.2 MG/DL (ref 0–0.3)
BILIRUBIN, INDIRECT: ABNORMAL MG/DL (ref 0–1)
BUN BLDV-MCNC: 11 MG/DL (ref 6–23)
C-REACTIVE PROTEIN: 0.3 MG/DL (ref 0–0.4)
CALCIUM SERPL-MCNC: 9.6 MG/DL (ref 8.6–10.2)
CHLORIDE BLD-SCNC: 104 MMOL/L (ref 98–107)
CO2: 24 MMOL/L (ref 22–29)
CREAT SERPL-MCNC: 0.8 MG/DL (ref 0.7–1.2)
EOSINOPHILS ABSOLUTE: 0 E9/L (ref 0.05–0.5)
EOSINOPHILS RELATIVE PERCENT: 0 % (ref 0–6)
GFR AFRICAN AMERICAN: >60
GFR NON-AFRICAN AMERICAN: >60 ML/MIN/1.73
GLUCOSE BLD-MCNC: 139 MG/DL (ref 74–99)
GRAM STAIN ORDERABLE: NORMAL
HCT VFR BLD CALC: 43.6 % (ref 37–54)
HEMOGLOBIN: 14.5 G/DL (ref 12.5–16.5)
IMMATURE GRANULOCYTES #: 0.06 E9/L
IMMATURE GRANULOCYTES %: 0.5 % (ref 0–5)
LACTIC ACID, SEPSIS: 2.4 MMOL/L (ref 0.5–1.9)
LYMPHOCYTES ABSOLUTE: 0.35 E9/L (ref 1.5–4)
LYMPHOCYTES RELATIVE PERCENT: 3.1 % (ref 20–42)
MAGNESIUM: 1.9 MG/DL (ref 1.6–2.6)
MCH RBC QN AUTO: 31.1 PG (ref 26–35)
MCHC RBC AUTO-ENTMCNC: 33.3 % (ref 32–34.5)
MCV RBC AUTO: 93.6 FL (ref 80–99.9)
METER GLUCOSE: 110 MG/DL (ref 74–99)
METER GLUCOSE: 116 MG/DL (ref 74–99)
METER GLUCOSE: 138 MG/DL (ref 74–99)
METER GLUCOSE: 149 MG/DL (ref 74–99)
MONOCYTES ABSOLUTE: 0.23 E9/L (ref 0.1–0.95)
MONOCYTES RELATIVE PERCENT: 2 % (ref 2–12)
NEUTROPHILS ABSOLUTE: 10.75 E9/L (ref 1.8–7.3)
NEUTROPHILS RELATIVE PERCENT: 94.3 % (ref 43–80)
PDW BLD-RTO: 16.2 FL (ref 11.5–15)
PHOSPHORUS: 3.1 MG/DL (ref 2.5–4.5)
PLATELET # BLD: 230 E9/L (ref 130–450)
PMV BLD AUTO: 9.8 FL (ref 7–12)
POTASSIUM SERPL-SCNC: 4.5 MMOL/L (ref 3.5–5)
RBC # BLD: 4.66 E12/L (ref 3.8–5.8)
RBC # BLD: NORMAL 10*6/UL
SEDIMENTATION RATE, ERYTHROCYTE: 5 MM/HR (ref 0–15)
SODIUM BLD-SCNC: 139 MMOL/L (ref 132–146)
TOTAL CK: 46 U/L (ref 20–200)
TOTAL PROTEIN: 5.9 G/DL (ref 6.4–8.3)
WBC # BLD: 11.4 E9/L (ref 4.5–11.5)

## 2022-04-12 PROCEDURE — 94640 AIRWAY INHALATION TREATMENT: CPT

## 2022-04-12 PROCEDURE — 6370000000 HC RX 637 (ALT 250 FOR IP): Performed by: INTERNAL MEDICINE

## 2022-04-12 PROCEDURE — 84100 ASSAY OF PHOSPHORUS: CPT

## 2022-04-12 PROCEDURE — 80076 HEPATIC FUNCTION PANEL: CPT

## 2022-04-12 PROCEDURE — 85651 RBC SED RATE NONAUTOMATED: CPT

## 2022-04-12 PROCEDURE — 36415 COLL VENOUS BLD VENIPUNCTURE: CPT

## 2022-04-12 PROCEDURE — 2700000000 HC OXYGEN THERAPY PER DAY

## 2022-04-12 PROCEDURE — 83605 ASSAY OF LACTIC ACID: CPT

## 2022-04-12 PROCEDURE — 6360000002 HC RX W HCPCS

## 2022-04-12 PROCEDURE — 6360000002 HC RX W HCPCS: Performed by: INTERNAL MEDICINE

## 2022-04-12 PROCEDURE — 83735 ASSAY OF MAGNESIUM: CPT

## 2022-04-12 PROCEDURE — 80048 BASIC METABOLIC PNL TOTAL CA: CPT

## 2022-04-12 PROCEDURE — 86140 C-REACTIVE PROTEIN: CPT

## 2022-04-12 PROCEDURE — 82962 GLUCOSE BLOOD TEST: CPT

## 2022-04-12 PROCEDURE — 85025 COMPLETE CBC W/AUTO DIFF WBC: CPT

## 2022-04-12 PROCEDURE — 82550 ASSAY OF CK (CPK): CPT

## 2022-04-12 PROCEDURE — 2060000000 HC ICU INTERMEDIATE R&B

## 2022-04-12 RX ORDER — METHYLPREDNISOLONE SODIUM SUCCINATE 40 MG/ML
40 INJECTION, POWDER, LYOPHILIZED, FOR SOLUTION INTRAMUSCULAR; INTRAVENOUS EVERY 8 HOURS
Status: DISCONTINUED | OUTPATIENT
Start: 2022-04-12 | End: 2022-04-13

## 2022-04-12 RX ORDER — METHYLPREDNISOLONE SODIUM SUCCINATE 125 MG/2ML
INJECTION, POWDER, LYOPHILIZED, FOR SOLUTION INTRAMUSCULAR; INTRAVENOUS
Status: DISPENSED
Start: 2022-04-12 | End: 2022-04-13

## 2022-04-12 RX ORDER — SPIRONOLACTONE 25 MG/1
12.5 TABLET ORAL
Status: DISCONTINUED | OUTPATIENT
Start: 2022-04-13 | End: 2022-04-14 | Stop reason: HOSPADM

## 2022-04-12 RX ADMIN — IPRATROPIUM BROMIDE AND ALBUTEROL SULFATE 1 AMPULE: 2.5; .5 SOLUTION RESPIRATORY (INHALATION) at 12:09

## 2022-04-12 RX ADMIN — ARFORMOTEROL TARTRATE 15 MCG: 15 SOLUTION RESPIRATORY (INHALATION) at 08:25

## 2022-04-12 RX ADMIN — BUDESONIDE 500 MCG: 0.5 SUSPENSION RESPIRATORY (INHALATION) at 08:25

## 2022-04-12 RX ADMIN — METHYLPREDNISOLONE SODIUM SUCCINATE 60 MG: 125 INJECTION, POWDER, LYOPHILIZED, FOR SOLUTION INTRAMUSCULAR; INTRAVENOUS at 12:00

## 2022-04-12 RX ADMIN — METHYLPREDNISOLONE SODIUM SUCCINATE 40 MG: 40 INJECTION, POWDER, LYOPHILIZED, FOR SOLUTION INTRAMUSCULAR; INTRAVENOUS at 20:07

## 2022-04-12 RX ADMIN — ARFORMOTEROL TARTRATE 15 MCG: 15 SOLUTION RESPIRATORY (INHALATION) at 19:28

## 2022-04-12 RX ADMIN — METHYLPREDNISOLONE SODIUM SUCCINATE 60 MG: 125 INJECTION, POWDER, LYOPHILIZED, FOR SOLUTION INTRAMUSCULAR; INTRAVENOUS at 04:10

## 2022-04-12 RX ADMIN — BUDESONIDE 500 MCG: 0.5 SUSPENSION RESPIRATORY (INHALATION) at 19:27

## 2022-04-12 RX ADMIN — PANTOPRAZOLE SODIUM 40 MG: 40 TABLET, DELAYED RELEASE ORAL at 05:07

## 2022-04-12 RX ADMIN — ENOXAPARIN SODIUM 40 MG: 40 INJECTION SUBCUTANEOUS at 10:44

## 2022-04-12 RX ADMIN — IPRATROPIUM BROMIDE AND ALBUTEROL SULFATE 1 AMPULE: 2.5; .5 SOLUTION RESPIRATORY (INHALATION) at 15:36

## 2022-04-12 RX ADMIN — IPRATROPIUM BROMIDE AND ALBUTEROL SULFATE 1 AMPULE: 2.5; .5 SOLUTION RESPIRATORY (INHALATION) at 08:25

## 2022-04-12 RX ADMIN — IPRATROPIUM BROMIDE AND ALBUTEROL SULFATE 1 AMPULE: 2.5; .5 SOLUTION RESPIRATORY (INHALATION) at 19:27

## 2022-04-12 RX ADMIN — FERROUS SULFATE TAB 325 MG (65 MG ELEMENTAL FE) 325 MG: 325 (65 FE) TAB at 10:44

## 2022-04-12 RX ADMIN — FERROUS SULFATE TAB 325 MG (65 MG ELEMENTAL FE) 325 MG: 325 (65 FE) TAB at 17:33

## 2022-04-12 ASSESSMENT — PAIN SCALES - GENERAL
PAINLEVEL_OUTOF10: 0
PAINLEVEL_OUTOF10: 0

## 2022-04-12 NOTE — PROGRESS NOTES
[address] [place] [address] [facility]  Progress Note    SUBJECTIVE:  Chief Complaint   Patient presents with    Shortness of Breath     onset yesterday, hx COPD     The patient is tolerating antibiotic. Admits to dyspnea. Patient is still improving. No nausea or vomiting. No fever. Review of systems:  As stated above in the chief complaint, otherwise negative. Medications:  Scheduled Meds:   [START ON 2022] spironolactone  12.5 mg Oral Q MWF    budesonide  0.5 mg Nebulization BID    azithromycin  500 mg Oral Once per day on     ferrous sulfate  325 mg Oral BID WC    torsemide  10 mg Oral Once per day on     pantoprazole  40 mg Oral QAM AC    Arformoterol Tartrate  15 mcg Nebulization BID    ipratropium-albuterol  1 ampule Inhalation Q4H WA    methylPREDNISolone  60 mg IntraVENous Q8H    enoxaparin  40 mg SubCUTAneous Daily    insulin lispro  0-6 Units SubCUTAneous TID WC    insulin lispro  0-3 Units SubCUTAneous Nightly     Continuous Infusions:   dextrose       PRN Meds:benzonatate, guaiFENesin-dextromethorphan, ondansetron, potassium chloride **OR** potassium alternative oral replacement **OR** potassium chloride, acetaminophen, glucagon (rDNA), dextrose, dextrose bolus (hypoglycemia) **OR** dextrose bolus (hypoglycemia), glucose, oxyCODONE-acetaminophen **AND** oxyCODONE    OBJECTIVE:  /73   Pulse 94   Temp 97.7 °F (36.5 °C) (Oral)   Resp 18   Ht 6' (1.829 m)   Wt 188 lb 4.8 oz (85.4 kg)   SpO2 94%   BMI 25.54 kg/m²   Temp  Av.7 °F (36.5 °C)  Min: 97.7 °F (36.5 °C)  Max: 97.7 °F (36.5 °C)  Constitutional: The patient is awake, alert, and oriented. Sitting up in bed. Skin: Warm and dry. No rashes were noted. HEENT: Round and reactive pupils. Moist mucous membranes. No ulcerations or thrush. Neck: Supple to movements. Chest: No use of accessory muscles to breathe. Symmetrical expansion.   No wheezing, crackles or rhonchi. Cardiovascular: Heart sounds rhythmic and regular. Abdomen: Positive bowel sounds to auscultation. Extremities: No edema.   Lines: peripheral    Laboratory and Tests Review:  Lab Results   Component Value Date    WBC 11.4 04/12/2022    WBC 7.1 04/11/2022    WBC 7.6 04/10/2022    HGB 14.5 04/12/2022    HCT 43.6 04/12/2022    MCV 93.6 04/12/2022     04/12/2022     Lab Results   Component Value Date    NEUTROABS 10.75 (H) 04/12/2022    NEUTROABS 6.80 04/11/2022    NEUTROABS 5.70 04/10/2022     No results found for: CRP  Lab Results   Component Value Date    ALT 10 04/12/2022    AST 10 04/12/2022    ALKPHOS 70 04/12/2022    BILITOT 0.3 04/12/2022     Lab Results   Component Value Date     04/12/2022    K 4.5 04/12/2022    K 3.3 04/10/2022     04/12/2022    CO2 24 04/12/2022    BUN 11 04/12/2022    CREATININE 0.8 04/12/2022    CREATININE 0.7 04/11/2022    CREATININE 0.7 04/10/2022    GFRAA >60 04/12/2022    LABGLOM >60 04/12/2022    GLUCOSE 139 04/12/2022    PROT 5.9 04/12/2022    LABALBU 3.9 04/12/2022    CALCIUM 9.6 04/12/2022    BILITOT 0.3 04/12/2022    ALKPHOS 70 04/12/2022    AST 10 04/12/2022    ALT 10 04/12/2022     Lab Results   Component Value Date    CRP 0.3 04/12/2022    CRP 0.8 (H) 04/11/2022    CRP 0.3 03/18/2022     Lab Results   Component Value Date    SEDRATE 5 04/12/2022    SEDRATE 2 04/11/2022    SEDRATE 1 03/18/2022     Radiology:      Microbiology:   Urine culture 4/11/2022: Negative  Blood culture 4/11/2022: Negative  Respiratory culture 4/11/2022: Pending  Streptococcus pneumoniae/Legionella urine Ag: negative  Respiratory panel: Negative  Rapid SARS-CoV-2: Negative    Recent Labs     04/11/22  0700   PROCAL 0.07       ASSESSMENT:  · Exacerbation COPD  · MAC infection of the lungs, on chronic Azithromycin suppression    PLAN:  · Continue Azithromycin suppression    Spoke with nursing    Justus Weiss MD  11:57 AM  4/12/2022

## 2022-04-12 NOTE — PROGRESS NOTES
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 *      SUBJECTIVE:  Shannan Bonilla states he feels somewhat better but remains frustrated with his underlying chronic condition. His proBNP has decreased significantly as well. REVIEW OF SYSTEMS:  General ROS: negative for - fatigue, malaise,  weight gain or weight loss  Psychological ROS: negative for - anxiety , depression  Ophthalmic ROS: negative for - decreased vision or visual distortion. ENT ROS: negative  Allergy and Immunology ROS: negative  Hematological and Lymphatic ROS: negative  Endocrine: no heat or cold intolerance and no polyphagia, polydipsia, or polyuria  Respiratory ROS: positive for - cough and shortness of breath  Cardiovascular ROS: positive for - dyspnea on exertion and shortness of breath. Gastrointestinal ROS: no abdominal pain, change in bowel habits, or black or bloody stools  Genito-Urinary ROS: no nocturia, dysuria, trouble voiding, frequency or hematuria  Musculoskeletal ROS: negative for- myalgias, arthralgias, or claudication  Neurological ROS: no TIA or stroke symptoms otherwise no significant change in symptoms or problems since yesterday as documented in previous progress notes.     SCHEDULED MEDICATIONS:   spironolactone  12.5 mg Oral Daily    budesonide  0.5 mg Nebulization BID    azithromycin  500 mg Oral Once per day on Mon Wed Fri    ferrous sulfate  325 mg Oral BID WC    torsemide 10 mg Oral Once per day on Mon Wed Fri    pantoprazole  40 mg Oral QAM AC    Arformoterol Tartrate  15 mcg Nebulization BID    ipratropium-albuterol  1 ampule Inhalation Q4H WA    methylPREDNISolone  60 mg IntraVENous Q8H    enoxaparin  40 mg SubCUTAneous Daily    insulin lispro  0-6 Units SubCUTAneous TID WC    insulin lispro  0-3 Units SubCUTAneous Nightly       VITAL SIGNS:                                                                                                                          /73   Pulse 94   Temp 97.7 °F (36.5 °C) (Oral)   Resp 18   Ht 6' (1.829 m)   Wt 188 lb 4.8 oz (85.4 kg)   SpO2 94%   BMI 25.54 kg/m²   Patient Vitals for the past 96 hrs (Last 3 readings):   Weight   04/12/22 0624 188 lb 4.8 oz (85.4 kg)   04/10/22 2215 189 lb 4 oz (85.8 kg)   04/10/22 1250 190 lb (86.2 kg)     OBJECTIVE:    HEENT: PERRL, EOM  Intact; sclera non-icteric, conjunctiva pink. Carotids are brisk in upstroke with normal contour. No carotid bruits. Normal jugular venous pulsation at 45°. No palpable cervical nor supraclavicular nodes. Thyroid not palpable. Trachea midline. Chest: Even excursion  Lungs: CTA B, no expiratory wheezes or rhonchi, no decreased tactile fremitus without inspiratory rales. Heart: Regular  rhythm; S1 > S2, no gallop or murmur. No clicks, rub, palpable thrills   or heaves. PMI nondisplaced, 5th intercostal space MCL. Abdomen: Soft, nontender, nondistended,  scaphoid, no masses or organomegaly. Bowel sounds active. Extremities: Without clubbing, cyanosis or edema. Pulses present 3+ upper extermities bilaterally; present 1+ DP and present 1+ PT bilaterally.      Data:   Scheduled Meds: Reviewed  Continuous Infusions:    dextrose       No intake or output data in the 24 hours ending 04/12/22 1052  CBC:   Recent Labs     04/10/22  1316 04/11/22  0025 04/12/22  0350   WBC 7.6 7.1 11.4   HGB 16.2 15.9 14.5   HCT 48.6 47.7 43.6    212 230     BMP:  Recent Labs 04/10/22  1705 04/10/22  1705 04/11/22  0025 04/11/22  0025 04/12/22  0350     --  138  --  139   K 3.3*  --  4.0  --  4.5     --  103  --  104   CO2 23  --  21*  --  24   BUN 9  --  9  --  11   CREATININE 0.7  --  0.7  --  0.8   LABGLOM >60   < > >60   < > >60    < > = values in this interval not displayed. ABGs:   Lab Results   Component Value Date    PH 7.428 04/10/2022    PO2 60.5 04/10/2022    PCO2 29.0 04/10/2022     INR: No results for input(s): INR in the last 72 hours. PRO-BNP:   Lab Results   Component Value Date    PROBNP 1,736 (H) 04/11/2022    PROBNP 3,486 (H) 04/10/2022      TSH:   Lab Results   Component Value Date    TSH 0.566 04/11/2022      Cardiac Injury Profile:   Recent Labs     04/10/22  2042 04/11/22  0025   TROPHS <6 <6      Lipid Profile:   Lab Results   Component Value Date    TRIG 43 04/11/2022    HDL 76 04/11/2022    LDLCALC 62 04/11/2022    CHOL 147 04/11/2022      Hemoglobin A1C: No components found for: HGBA1C      RAD:   Echo Complete    Result Date: 3/15/2022  Transthoracic Echocardiography Report (TTE)  Demographics   Patient Name        Jillian Guzmán Gender               Male   Medical Record      [id number]    Room Number          6258  Number   Account #           [de-identified]   Procedure Date       03/15/2022   Corporate ID                    Ordering Physician   Saba Braun DO   Accession Number    [id number]  Referring Physician  Isaac Lindquist   Date of Birth       1950  Sonographer          NING Hill Rodri   Age                 73 year(s)  Interpreting         Saba Braun DO                                  Physician                                   Any Other  Procedure Type of Study   TTE procedure  Procedure Date Date: 03/15/2022 Start: 10:12 AM Study Location: Echo Lab Technical Quality: Adequate visualization Indications:Congestive heart failure.  Patient Status: Routine Height: 72 inches Weight: 193 pounds BSA: 2.1 m^2 BMI: 26.18 kg/m^2 BP: 112/73 mmHg  Findings   Left Ventricle  Left ventricle grossly normal in size. Normal LV segmental wall motion. Normal left ventricular wall thickness. Estimated left ventricular ejection fraction is 62±5%. Does not meet 50% diagnostic criteria for diastolic dysfunction. Right Ventricle  Moderately dilated right ventricle. TAPSE is normal   Left Atrium  Left atrium is of normal size. The LAESV Index is <34ml/m2. Interatrial septum appears intact. Right Atrium  Mildly enlarged right atrium. Mitral Valve  Structurally normal mitral valve. Physiologic and/or trace mitral regurgitation is present. Tricuspid Valve  The tricuspid valve appears structurally normal.  Physiologic and/or trace tricuspid regurgitation. RVSP is 14 mmHg. Aortic Valve  The aortic valve is trileaflet. Aortic valve opens well. No doppler evidence of aortic stenosis or regurgitation. Pulmonic Valve  Pulmonic valve is structurally normal.  No evidence of pulmonic regurgitation. Pericardial Effusion  No evidence of pericardial thickening/calcification present. No evidence of pericardial effusion. Aorta  Aortic root dimension within normal limits. Aorta appears sclerotic and/or calcified. Miscellaneous  Dilated Inferior Vena Cava. Conclusions   Summary  Left ventricle grossly normal in size. Normal LV segmental wall motion. Normal left ventricular wall thickness. Estimated left ventricular ejection fraction is 62±5%. Does not meet 50% diagnostic criteria for diastolic dysfunction. The LAESV Index is <34ml/m2. Moderately dilated right ventricle. TAPSE is normal  Physiologic and/or trace mitral regurgitation is present. Technically fair quality study. Compared to prior echo, no significant changes noted. Suggest clinical correlation.    Signature   ----------------------------------------------------------------  Electronically signed by Shaye Tripp DO(Interpreting  physician) on 03/15/2022 10:40 PM ----------------------------------------------------------------  M-Mode/2D Measurements & Calculations   LV Diastolic    LV Systolic Dimension: 3.4  AV Cusp Separation: 2.3 cmLA  Dimension: 4.4  cm                          Dimension: 3.2 cmAO Root  cm              LV Volume Diastolic: 86 ml  Dimension: 3.9 cm  LV FS:22.7 %    LV Volume Systolic: 57.3 ml  LV PW           LV EDV/LV EDV Index: 86  Diastolic: 1 cm XF/57 WY/Z^3JI ESV/LV ESV  LV PW Systolic: Index: 93.1 QU/33TC/ m^2    RV Diastolic Dimension: 4.7 cm  1 cm            EF Calculated: 44.9 %  Septum          LV Mass Index: 66 l/min*m^2 LA/Aorta: 5.83  Diastolic: 0.9  LV Length: 8.9 cm           Ascending Aorta: 3.3 cm  cm                                          LA volume/Index: 41.1 ml  Septum          LVOT: 2.3 cm                /85IB/X^1  Systolic: 1.7                               RA Area: 20.1 cm^2  cm   LV Mass: 137.77  g  Doppler Measurements & Calculations   MV Peak E-Wave: 0.54 AV Peak Velocity:     LVOT Peak Velocity: 1.13 m/s  m/s                  1.01 m/s              LVOT Mean Velocity: 0.64 m/s  MV Peak A-Wave: 0.73 AV Peak Gradient:     LVOT Peak Gradient: 5.1  m/s                  4.08 mmHg             mmHgLVOT Mean Gradient: 2 mmHg  MV E/A Ratio: 0.74   AV Mean Velocity:  MV Peak Gradient:    0.64 m/s  2.8 mmHg             AV Mean Gradient: 1.9  MV Mean Gradient:    mmHg                  TR Velocity:1.23 m/s  1.6 mmHg             AV VTI: 20.1 cm       TR Gradient:6.09 mmHg  MV Mean Velocity:    AV Area               PV Peak Velocity: 0.88 m/s  0.6 m/s              (Continuity):4.5 cm^2 PV Peak Gradient: 3.1 mmHg  MV Deceleration                            PV Mean Velocity: 0.65 m/s  Time: 256.3 msec     LVOT VTI: 21.8 cm     PV Mean Gradient: 1.8 mmHg  MV P1/2t: 48 msec  MVA by PHT:4.58 cm^2  MV Area  (continuity): 4.9  cm^2  MV E' Septal  Velocity: 0.07 m/s  MV E' Lateral  Velocity: 11 m/s [email or web address] [id number]    CT CHEST WO CONTRAST    Result Date: 3/15/2022  EXAMINATION: CT OF THE CHEST WITHOUT CONTRAST 3/15/2022 4:11 pm TECHNIQUE: CT of the chest was performed without the administration of intravenous contrast. Multiplanar reformatted images are provided for review. Dose modulation, iterative reconstruction, and/or weight based adjustment of the mA/kV was utilized to reduce the radiation dose to as low as reasonably achievable. COMPARISON: April 11, 2021 and October 15, 2020. Tata Padron HISTORY: ORDERING SYSTEM PROVIDED HISTORY: lung mass / infiltrate TECHNOLOGIST PROVIDED HISTORY: Reason for exam:->lung mass / infiltrate FINDINGS: The heart size is normal.  Small pericardial effusion is present. There is coronary artery calcification. Great vessels are normal.  Moderately enlarged mediastinal lymphadenopathy with lymph nodes measuring up to 1.2 cm in the mediastinum are noted. This is somewhat improved. There is advanced centrilobular emphysema with extensive scarring and fibrosis with large bullous changes. The previously noted infiltrative mass in the left upper lobe has resolved. Multiple spiculated nodular densities in the upper and lower lobes ranging from 6 to 8 mm are noted bilaterally without change. However, there are some nodular densities which are new especially  a 2 x 1.1 cm infiltrative nodule in the right middle lobe. There is scarring and fibrosis in the lung bases. There is no focal consolidation. The liver is of normal architecture. Degenerative changes are identified in the thoracic spine. Advanced centrilobular emphysema with extensive scarring, fibrosis and bullous changes.  Resolution of the previously noted infiltrative mass in the left upper lobe with the extensive  spiculated and infiltrative nodules and masses some of which are new especially the 1 in the right middle lobe which are indeterminate for developing malignancy. Close surveillance according to Fleischner society guidelines is recommended. Borderline cardiac size with the tiny pericardial effusion and coronary artery calcification. XR CHEST PORTABLE    Result Date: 4/10/2022  EXAMINATION: ONE XRAY VIEW OF THE CHEST 4/10/2022 1:24 pm COMPARISON: March 14, 2022 HISTORY: ORDERING SYSTEM PROVIDED HISTORY: sob TECHNOLOGIST PROVIDED HISTORY: Reason for exam:->sob FINDINGS: Chronic irregular interstitial opacities bilaterally notable in the lung bases. Areas of scarring or subsegmental atelectasis also in lung bases. Hyperinflation of both lungs notable in upper lung fields. No pneumothorax. The heart is normal in size. 1. Stable chronic irregular interstitial opacities bilaterally. 2. No focal airspace opacity or pleural effusion. 3. Emphysematous changes. XR CHEST PORTABLE    Result Date: 3/14/2022  EXAMINATION: ONE XRAY VIEW OF THE CHEST 3/14/2022 12:34 pm COMPARISON: 1st February 2022 HISTORY: ORDERING SYSTEM PROVIDED HISTORY: shortness of breath TECHNOLOGIST PROVIDED HISTORY: Reason for exam:->shortness of breath FINDINGS: Mildly worsening pneumonia at the right base and left mid lung. There is stable pneumonia and or fibrosis at the left base. Pulmonary hypertension is suggested. There is pronounced obstructive airways disease. Worsening pneumonia at the right base and left mid lung. Other stable findings as enumerated above.          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 *      RECOMMENDATIONS:  Continue low-dose diuretic and monitor blood pressure and make appropriate adjustments to maintain systolic blood pressure  120 mmHg. Ideally he should be on a beta-blocker for his thoracic aneurysm however due to his underlying lung disease will withhold and simply attempt to maintain his blood pressure within normal range. Continue to carefully monitor serum electrolytes and blood pressure. I have spent more than 25 minutes face to face with Eldon Martinez and reviewing notes and laboratory data, with greater than 50% of this time instructing and counseling the patient face to face regarding my findings and recommendations and I have answered all questions as posed to me by Mr. HolguinVíctor Gianfranco. Boyd Hair, DO FACP,FACC,[facility]      NOTE:  This report was transcribed using voice recognition software.   Every effort was made to ensure accuracy; however, inadvertent computerized transcription errors may be present

## 2022-04-12 NOTE — CONSULTS
CARDIOLOGY CONSULTATION    Patient Name:  Clark Sarah    :  1950    Reason for Consultation:   Shortness of breath and elevated proBNP    History of Present Illness:   Clark Sarah returns to [facility], as 1 of multiple admissions for exacerbation of his underlying lung disease. On this occasion his proBNP was elevated at >3400 pg/mL he is now readmitted for reassessment of his shortness of breath and to address his elevated proBNP. Of note is that a recent two-dimensional echocardiogram demonstrated preserved left ventricular systolic function without evidence of diastolic dysfunction and for unknown reasons did not demonstrate evidence for pulmonary hypertension although his right ventricle was significantly enlarged both on echo and nuclear stress testing. Presently he denies any chest discomfort nor palpitations or lightheadedness. 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     Surgical History:   has a past surgical history that includes Abscess Drainage (); Colonoscopy (2013); lumbar fusion (2019); bronchoscopy (N/A, 2019); bronchoscopy (N/A, 10/7/2019); Insert Picc Cath, 5/> Yrs (4/10/2021); and bronchoscopy (N/A, 2021). Social History:   reports that he quit smoking about 7 years ago. His smoking use included cigarettes. He started smoking about 53 years ago. He has a 92.00 pack-year smoking history. He has never used smokeless tobacco. He reports that he does not drink alcohol and does not use drugs. Family History:  family history includes Other in his father and mother. Both parents  secondary to meniscus of old age. Medications:  Prior to Admission medications    Medication Sig Start Date End Date Taking?  Authorizing Provider   etodolac (LODINE XL) 500 MG extended release tablet Take 500 mg by mouth daily 4/10/22  Yes Historical Provider, MD   azithromycin (ZITHROMAX) 250 MG tablet Take 500 mg by mouth every other day Indications: Mild Upper Respiratory Infection   Yes Historical Provider, MD   budesonide (PULMICORT) 0.5 MG/2ML nebulizer suspension Take 2 mLs by nebulization 2 times daily  Patient not taking: Reported on 4/10/2022 4/5/22   MIGUE Gordon NP   formoterol (PERFOROMIST) 20 MCG/2ML nebulizer solution Take 2 mLs by nebulization 2 times daily 22   MIGUE Gordon NP   torsemide (DEMADEX) 10 MG tablet Take 1 tablet by mouth three times a week  Patient not taking: Reported on 4/10/2022 3/21/22   Nathalie Ocampo MD   benzonatate (TESSALON) 100 MG capsule Take 100 mg by mouth 3 times daily as needed for Cough  Patient not taking: Reported on 4/10/2022    Historical Provider, MD   guaiFENesin-dextromethorphan (ROBITUSSIN DM) 100-10 MG/5ML syrup Take 5 mLs by mouth 4 times daily as needed for Cough  Patient not taking: Reported on 4/10/2022    Historical Provider, MD   ferrous sulfate (IRON 325) 325 (65 Fe) MG tablet Take 1 tablet by mouth 2 times daily (with meals) 22 Jim Jaimes DO   pantoprazole (PROTONIX) 40 MG tablet Take 1 tablet by mouth every morning (before breakfast)  Patient not taking: Reported on 4/10/2022 2/8/22   [address], DO   Probiotic Product (PROBIOTIC-10 PO) Take by mouth daily     Historical Provider, MD   Handicap Placard MISC by Does not apply route Patient cannot walk 200 ft without stopping to rest.    Expiration 5 yrs. 8/19/21   Aurea Ford MD   Fluticasone-Umeclidin-Vilant (TRELEGY ELLIPTA IN) Inhale 1 puff into the lungs daily  Patient not taking: Reported on 4/10/2022    Historical Provider, MD   ipratropium-albuterol (DUONEB) 0.5-2.5 (3) MG/3ML SOLN nebulizer solution Inhale 3 mLs into the lungs every 4 hours (while awake) 4/13/21   Stoney Jaimes DO   Respiratory Therapy Supplies (FULL KIT NEBULIZER SET) MISC Use as directed with nebulized medication. 4/13/21   Angela Jaimes DO   oxyCODONE-acetaminophen (PERCOCET) 7.5-325 MG per tablet Take 1 tablet by mouth 2 times daily as needed for Pain. 3/11/20   Historical Provider, MD       Allergies:  Patient has no known allergies. Review of Systems:   · Constitutional: there has been no unanticipated weight loss. There's been no significant change in energy level, sleep pattern or activity level. No fever chills or rigors. · Eyes: No visual changes or diplopia. No scleral icterus. · ENT: No Headaches, hearing loss or vertigo. No mouth sores or sore throat. No change in taste or smell. · Cardiovascular: No chest discomfort, dyspnea on exertion, palpitations, loss of consciousness, no phlebitis, no claudication. · Respiratory: No cough or wheezing, no sputum production. No hemoptysis, pleuritic pain. · Gastrointestinal: No abdominal pain, appetite loss, blood in stools. No change in bowel habits. No hematemesis  · Genitourinary: No dysuria, trouble voiding or hematuria. No nocturia or increased frequency.   · Musculoskeletal:  No gait disturbance, weakness or joint complaints. · Integumentary: No rash or pruritis. · Neurological: No headache, diplopia, change in muscle strength, numbness or tingling. No change in gait, balance, coordination, mood, affect, memory, mentation, behavior. · Psychiatric: No anxiety or depression. · Endocrine: No temperature intolerance. No excessive thirst, fluid intake, or urination. No tremor. · Hematologic/Lymphatic: No abnormal bruising or bleeding, blood clots or swollen lymph nodes. · Allergic/Immunologic: No nasal congestion or hives. Physical Examination:    Vital Signs: /80   Pulse 96   Temp 97.7 °F (36.5 °C) (Axillary)   Resp 20   Ht 6' (1.829 m)   Wt 188 lb 4.8 oz (85.4 kg)   SpO2 98%   BMI 25.54 kg/m²   General appearance: Well preserved, mesomorphic body habitus, alert, no distress. Skin: Skin color, texture, turgor normal. No rashes or lesions. No induration or tightening palpated. Head: Normocephalic. No masses, lesions, tenderness or abnormalities  Eyes: Conjunctivae/corneas clear. PERRL, EOMs intact. Sclera non icteric. Ears: External ears normal. Canals clear. TM's clear bilaterally. Hearing normal to finger rub. Nose/Sinuses: Nares normal. Septum midline. Mucosa normal. No drainage or sinus tenderness. Oropharynx: Lips, mucosa, and tongue normal. Oropharynx clear with no exudate seen. Neck: Neck supple and symmetric. No adenopathy. Thyroid symmetric, normal size, without nodules. Trachea is midline. Carotids brisk in upstroke without bruits, no abnormal JVP noted at 45°. Chest: Even excursion  Lungs: Lungs with coarse expiratory rhonchi to auscultation bilaterally. No retractions or use of accessory muscles. No tactile vocal fremitus. , crackles or rales. Heart:  S1 > S2. Regular rhythm. No gallop or murmur. No rub, palpable thrill or heave noted. PMI 5th intercostal space midclavicular line. Abdomen: Abdomen soft, mildly protuberant, non-tender. BS normal. No masses, organomegaly.  No hernia noted.  Extremities: Extremities normal. No deformities, edema, or skin discoloration. No cyanosis or clubbing noted to the nails. Peripheral pulses present 2+ upper extremities and present 2+  lower extremities. Musculoskeletal: Spine ROM normal. Muscular strength intact. Neuro: Cranial nerves intact. Motor: Strength 5/5 in all extremities. Reflexes 2+ in all extremities. No focal weakness. Sensory: grossly normal to touch. Coordination intact. Pertinent Labs:  CBC:   Recent Labs     04/10/22  1316 04/11/22  0025 04/12/22  0350   WBC 7.6 7.1 11.4   HGB 16.2 15.9 14.5    212 230     BMP:  Recent Labs     04/10/22  1705 04/10/22  1705 04/11/22  0025 04/11/22  0025 04/12/22  0350     --  138  --  139   K 3.3*  --  4.0  --  4.5     --  103  --  104   CO2 23  --  21*  --  24   BUN 9  --  9  --  11   CREATININE 0.7  --  0.7  --  0.8   GLUCOSE 94  --  266*  --  139*   LABGLOM >60   < > >60   < > >60    < > = values in this interval not displayed. ABGs:   Lab Results   Component Value Date    PH 7.428 04/10/2022    PO2 60.5 04/10/2022    PCO2 29.0 04/10/2022     INR:   No results for input(s): INR in the last 72 hours. PRO-BNP:   Lab Results   Component Value Date    PROBNP 1,736 (H) 04/11/2022    PROBNP 3,486 (H) 04/10/2022      Cardiac Injury Profile:   Recent Labs     04/11/22  0025 04/12/22  0350   CKTOTAL 83 46      Lipid Profile:   Lab Results   Component Value Date    TRIG 43 04/11/2022    HDL 76 04/11/2022    LDLCALC 62 04/11/2022    CHOL 147 04/11/2022      Hemoglobin A1C: No components found for: HGBA1C   ECG:  See report  ECHO: 11/13/2019-no significant change on 3/15/2022 study.   Summary   Left ventricle grossly normal in size.   Normal LV segmental wall motion.   Normal left ventricular wall thickness.   Estimated left ventricular ejection fraction is 62±5%.   <50% criteria for diastolic dysfunction.   The LAESV Index is <34ml/m2.   Moderately dilated right ventricle.  [place] ventricular segmental wall motion is normal.   Physiologic and/or trace mitral regurgitation is present.   Trace-mild aortic regurgitation is noted.   Physiologic and/or trace tricuspid regurgitation.  RVSP is 29 mmHg.   Physiologic and/or trace pulmonic regurgitation present.   Technically fair quality study. Radiology:  XR CHEST PORTABLE    Result Date: 4/10/2022  EXAMINATION: ONE XRAY VIEW OF THE CHEST 4/10/2022 1:24 pm COMPARISON: March 14, 2022 HISTORY: ORDERING SYSTEM PROVIDED HISTORY: sob TECHNOLOGIST PROVIDED HISTORY: Reason for exam:->sob FINDINGS: Chronic irregular interstitial opacities bilaterally notable in the lung bases. Areas of scarring or subsegmental atelectasis also in lung bases. Hyperinflation of both lungs notable in upper lung fields. No pneumothorax. The heart is normal in size. 1. Stable chronic irregular interstitial opacities bilaterally. 2. No focal airspace opacity or pleural effusion. 3. Emphysematous changes. 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 *      Plan:   Mr. Claudeen Canes unfortunately has rather advanced underlying chronic obstructive pulmonary disease in addition to having an enlarged right heart and an ascending thoracic aortic aneurysm. Thus would maintain his blood pressure at no >140 mmHg and preferably 120 mmHg and would utilize a low-dose diuretic to unload his right heart as well. Would also utilize low-dose spironolactone.   Continue to monitor renal function and serum potassium as well as blood pressure. I have spent more than 45 minutes face to face with Saray Pang reviewing notes and laboratory data with greater than 50% of this time instructing and counseling the patient regarding my findings and recommendations and I have answered all questions as posed to me by Mr. Jim Martinez. Thank you,Jim Pelayo and Conor Asher MD for allowing me to consult in the care of this patient. Sergio Souza DO DO, FACP, [facility] [place] [facility]    NOTE:  This report was transcribed using voice recognition software. Every effort was made to ensure accuracy; however, inadvertent computerized transcription errors may be present.

## 2022-04-12 NOTE — PROGRESS NOTES
PROGRESS  NOTE --                                                          INTERNAL  MEDICINE                                                                              I  PERSONALLY SAW , EXAMINED, AND CARED [address], 4/12/2022     LABS, XRAY ,CHART, AND MEDICATIONS  REVIEWED BY ME       Chief complaint: Shortness of breath      4/12/2022-SUBJECTIVE: Hal Clancy is alert awake and cooperative; oriented ×3. Denies any chest pain nausea emesis. Tolerating diet. No abdominal pain. Dyspnea not much different. Afebrile last 24 hours. SPO2 94 on 8 L nasal cannula high flow. Intake and output +480 cc. Lactic acid still elevated but improved since yesterday, currently 2.4. Fasting glucose 139. CRP 0.3 total CK 46. WBC 11.4 hemoglobin 14.5 platelets 699. ESR 5. Sputum Gram stain as follows--    Component 4/11/22 1942   Gram Stain Orderable Rare Polymorphonuclear leukocytes   Few Epithelial cells   Abundant Gram negative rods   Abundant Gram positive cocci in chains   Mixed Bacterial Morphotypes     Blood cultures negative to date. Molecular/PCR viral respiratory panel all not detected. Legionella strep antigens not detected. Cardiology consult from yesterday appreciated. proBNP greater than 3400 with complaints of shortness of breath. No evidence of pulmonary hypertension nor decreased EF on most recent 2D echo. Dyspnea most likely due to underlying severe chronic obstructive lung disease in addition to enlarged right heart and ascending aortic thoracic aneurysm. Would maintain blood pressure no less than 140 preferably 120 utilize low-dose diuretic to unload right heart. Also utilize low-dose spironolactone. ID consult from yesterday, resume azithromycin 500 mg Monday Wednesday Friday.   Social service note from today, patient may need new prescription for oxygen, previous prescription with 3 to 5 L/min currently on 8 L/min. Cardiology note from today, frustrated with chronic condition, proBNP has decreased significantly. Continue low-dose diuretic, ideally should be on beta-blocker for thoracic aortic aneurysm however due to underlying COPD attempt to maintain blood pressure without beta-blocker. Physical therapy consult from today, patient independent in room. ID note from today, continue azithromycin for suppression.         Objective:     PHYSICAL EXAM:    VS: /73   Pulse 94   Temp 97.7 °F (36.5 °C) (Oral)   Resp 18   Ht 6' (1.829 m)   Wt 188 lb 4.8 oz (85.4 kg)   SpO2 94%   BMI 25.54 kg/m²     Labs:   CBC:   Lab Results   Component Value Date    WBC 11.4 04/12/2022    RBC 4.66 04/12/2022    HGB 14.5 04/12/2022    HCT 43.6 04/12/2022    MCV 93.6 04/12/2022    MCH 31.1 04/12/2022    MCHC 33.3 04/12/2022    RDW 16.2 04/12/2022     04/12/2022    MPV 9.8 04/12/2022     CBC with Differential:    Lab Results   Component Value Date    WBC 11.4 04/12/2022    RBC 4.66 04/12/2022    HGB 14.5 04/12/2022    HCT 43.6 04/12/2022     04/12/2022    MCV 93.6 04/12/2022    MCH 31.1 04/12/2022    MCHC 33.3 04/12/2022    RDW 16.2 04/12/2022    NRBC 0.0 03/16/2022    LYMPHOPCT 3.1 04/12/2022    MONOPCT 2.0 04/12/2022    MYELOPCT 0.9 04/07/2021    BASOPCT 0.1 04/12/2022    MONOSABS 0.23 04/12/2022    LYMPHSABS 0.35 04/12/2022    EOSABS 0.00 04/12/2022    BASOSABS 0.01 04/12/2022     Hemoglobin/Hematocrit:    Lab Results   Component Value Date    HGB 14.5 04/12/2022    HCT 43.6 04/12/2022     CMP:    Lab Results   Component Value Date     04/12/2022    K 4.5 04/12/2022    K 3.3 04/10/2022     04/12/2022    CO2 24 04/12/2022    BUN 11 04/12/2022    CREATININE 0.8 04/12/2022    GFRAA >60 04/12/2022    LABGLOM >60 04/12/2022    GLUCOSE 139 04/12/2022    PROT 5.9 04/12/2022    LABALBU 3.9 04/12/2022    CALCIUM 9.6 04/12/2022    BILITOT 0.3 04/12/2022    ALKPHOS 70 04/12/2022    AST 10 04/12/2022    ALT 10 04/12/2022     BMP:    Lab Results   Component Value Date     04/12/2022    K 4.5 04/12/2022    K 3.3 04/10/2022     04/12/2022    CO2 24 04/12/2022    BUN 11 04/12/2022    LABALBU 3.9 04/12/2022    CREATININE 0.8 04/12/2022    CALCIUM 9.6 04/12/2022    GFRAA >60 04/12/2022    LABGLOM >60 04/12/2022    GLUCOSE 139 04/12/2022     Hepatic Function Panel:    Lab Results   Component Value Date    ALKPHOS 70 04/12/2022    ALT 10 04/12/2022    AST 10 04/12/2022    PROT 5.9 04/12/2022    BILITOT 0.3 04/12/2022    BILIDIR <0.2 04/12/2022    IBILI see below 04/12/2022    LABALBU 3.9 04/12/2022     Ionized Calcium:  No results found for: IONCA  Magnesium:    Lab Results   Component Value Date    MG 1.9 04/12/2022     Phosphorus:    Lab Results   Component Value Date    PHOS 3.1 04/12/2022     LDH:    Lab Results   Component Value Date     01/02/2021     Uric Acid:    Lab Results   Component Value Date    LABURIC 4.6 03/15/2022     PT/INR:    Lab Results   Component Value Date    PROTIME 12.5 05/30/2019    INR 1.1 05/30/2019     Warfarin PT/INR:  No components found for: PTPATWAR, PTINRWAR  PTT:    Lab Results   Component Value Date    APTT 31.4 05/30/2019   [APTT}  Troponin:    Lab Results   Component Value Date    TROPONINI <0.01 04/08/2021     Last 3 Troponin:    Lab Results   Component Value Date    TROPONINI <0.01 04/08/2021    TROPONINI <0.01 04/08/2021    TROPONINI <0.01 01/02/2021     U/A:    Lab Results   Component Value Date    COLORU Yellow 02/01/2022    PROTEINU Negative 02/01/2022    PHUR 5.5 02/01/2022    LABCAST FEW 10/02/2019    WBCUA NONE 04/07/2021    RBCUA NONE 04/07/2021    MUCUS Present 04/07/2021    BACTERIA FEW 04/07/2021    CLARITYU Clear 02/01/2022    SPECGRAV 1.020 02/01/2022    LEUKOCYTESUR Negative 02/01/2022    UROBILINOGEN 0.2 02/01/2022    BILIRUBINUR MODERATE 02/01/2022    BLOODU Negative 02/01/2022    GLUCOSEU Negative 02/01/2022     ABG:    Lab Results   Component Value Date PH 7.428 04/10/2022    PCO2 29.0 04/10/2022    PO2 60.5 04/10/2022    HCO3 18.7 04/10/2022    BE -4.1 04/10/2022    O2SAT 91.7 04/10/2022     HgBA1c:    Lab Results   Component Value Date    LABA1C 5.3 04/11/2022     FLP:    Lab Results   Component Value Date    TRIG 43 04/11/2022    HDL 76 04/11/2022    LDLCALC 62 04/11/2022    LABVLDL 9 04/11/2022     TSH:    Lab Results   Component Value Date    TSH 0.566 04/11/2022     VITAMIN B12: No components found for: B12  FOLATE:    Lab Results   Component Value Date    FOLATE 13.1 04/11/2022     IRON:    Lab Results   Component Value Date    IRON 61 04/11/2022     Iron Saturation:  No components found for: PERCENTFE  TIBC:    Lab Results   Component Value Date    TIBC 307 04/11/2022     FERRITIN:    Lab Results   Component Value Date    FERRITIN 144 04/11/2022        General appearance: Alert, Awake, Oriented times 3, no distress; nasal cannula oxygen in place  Skin: Warm and dry ; no rashes  Head: Normocephalic. No masses, lesions or tenderness noted  Eyes: Conjunctivae pink, sclera white. PERRL,EOM-I  Ears: External ears normal  Nose/Sinuses: Nares normal. Septum midline. Mucosa normal. No drainage  Oropharynx: Oropharynx clear with no exudate seen  Neck: Supple. No jugular venous distension, lymphadenopathy or thyromegaly Trachea midline  Lungs: Decreased excursion rare wheeze and rhonchi  Heart: S1 S2  Regular rate and rhythm. No rub, murmur or gallop  Abdomen: Soft, non-tender. BS normal. No masses, organomegaly; no rebound or guarding  Extremities: No edema, Peripheral pulses palpable  Musculoskeletal: Muscular strength appears intact. Neuro:  No focal motor defects ; II-XII grossly intact .  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    Acute heart failure with preserved ejection fraction (HCC)    Immunocompromised ([facility]    COPD exacerbation ([facility]  Resolved Problems:    * No resolved hospital problems. *      PLAN:  SEE ORDERS      RE  CHANGES AND FINDINGS   Medications reviewed with patient  GI prophylaxis  DVT prophylaxis  Consultants notes reviewed    Recheck proBNP in a.m. Arformoterol 50 mcg twice daily  Azithromycin 500 mg Monday Wednesday Friday  Budesonide 500 mcg twice daily  Ferrous sulfate 325 mg twice daily  Low-dose sliding scale insulin  DuoNeb every 4 hours while awake  Methylprednisolone 60 mg IV every 8 hours  Spironolactone 12.5 mg Monday Wednesday Friday  Torsemide 10 mg Monday Wednesday Friday  Percocet 7.5, twice daily as needed for pain      Discussed with patient and nursing. Davis Jaimes DO     11:47 AM     4/12/2022    TIME > 25 MINUTES    >  50 %  OF  TIME  DISCUSSION               ------------  INFORMATION  -----------      DIET:ADULT DIET;  Regular      No Known Allergies      MEDICATION SIDE EFFECTS:none       SCHEDULED MEDS:                                 Scheduled Meds:   [START ON 4/13/2022] spironolactone  12.5 mg Oral Q MWF    budesonide  0.5 mg Nebulization BID    azithromycin  500 mg Oral Once per day on Mon Wed Fri    ferrous sulfate  325 mg Oral BID WC    torsemide  10 mg Oral Once per day on Mon Wed Fri    pantoprazole  40 mg Oral QAM AC    Arformoterol Tartrate  15 mcg Nebulization BID    ipratropium-albuterol  1 ampule Inhalation Q4H WA    methylPREDNISolone  60 mg IntraVENous Q8H    enoxaparin  40 mg SubCUTAneous Daily    insulin lispro  0-6 Units SubCUTAneous TID     insulin lispro  0-3 Units SubCUTAneous Nightly       Continuous Infusions:   dextrose           Data:     No intake or output data in the 24 hours ending 04/12/22 1147    Wt Readings from Last 3 Encounters:   04/12/22 188 lb 4.8 oz (85.4 kg)   04/05/22 191 lb (86.6 kg)   03/18/22 185 lb (83.9 kg)       Labs: Additional    GLUCOSE:No results for input(s): POCGLU in the last 72 hours. BNP:No results found for: BNP    CRP:   Recent Labs     04/11/22  0700 04/12/22  0350   CRP 0.8* 0.3       ESR:  Recent Labs     04/11/22  0025 04/12/22  0350   SEDRATE 2 5       RADIOLOGY: REVIEWED AVAILABLE REPORT  XR CHEST PORTABLE   Final Result   1. Stable chronic irregular interstitial opacities bilaterally. 2. No focal airspace opacity or pleural effusion. 3. Emphysematous changes.                    [address]    11:47 AM     4/12/2022      Voice recognition software used for dictation

## 2022-04-12 NOTE — PROGRESS NOTES
[facility] [address] [facility]  [address] [facility], [address]  [facility], [address] [facility]      Pulmonary Progress Note      SUBJECTIVE:  Sitting up at bed, looking comfortable with breathing, thinks slightly better though still sob with minimal activity.  Currently on 7.5 li and saturating 93%    OBJECTIVE    Medications    Continuous Infusions:   dextrose         Scheduled Meds:   [START ON 2022] spironolactone  12.5 mg Oral Q MWF    budesonide  0.5 mg Nebulization BID    azithromycin  500 mg Oral Once per day on     ferrous sulfate  325 mg Oral BID WC    torsemide  10 mg Oral Once per day on     pantoprazole  40 mg Oral QAM AC    Arformoterol Tartrate  15 mcg Nebulization BID    ipratropium-albuterol  1 ampule Inhalation Q4H WA    methylPREDNISolone  60 mg IntraVENous Q8H    enoxaparin  40 mg SubCUTAneous Daily    insulin lispro  0-6 Units SubCUTAneous TID WC    insulin lispro  0-3 Units SubCUTAneous Nightly       PRN Meds:benzonatate, guaiFENesin-dextromethorphan, ondansetron, potassium chloride **OR** potassium alternative oral replacement **OR** potassium chloride, acetaminophen, glucagon (rDNA), dextrose, dextrose bolus (hypoglycemia) **OR** dextrose bolus (hypoglycemia), glucose, oxyCODONE-acetaminophen **AND** oxyCODONE    Physical    VITALS:  /73   Pulse 94   Temp 97.7 °F (36.5 °C) (Oral)   Resp 18   Ht 6' (1.829 m)   Wt 188 lb 4.8 oz (85.4 kg)   SpO2 94%   BMI 25.54 kg/m²     24HR INTAKE/OUTPUT:      Intake/Output Summary (Last 24 hours) at 2022 1352  Last data filed at 2022 0827  Gross per 24 hour   Intake 240 ml   Output --   Net 240 ml       24HR PULSE OXIMETRY RANGE:    SpO2  Av.7 %  Min: 94 %  Max: 98 %    General appearance: alert, appears stated age and cooperative  Lungs: clear to auscultation bilaterally  Heart: regular rate and rhythm, S1, S2 normal, no murmur, click, rub or gallop  Abdomen: soft, non-tender; bowel sounds normal; no masses,  no organomegaly  Extremities: extremities normal, atraumatic, no cyanosis or edema  Neurologic: Mental status: Alert, oriented, thought content appropriate    Data    CBC:   Recent Labs     04/10/22  1316 04/11/22  0025 04/12/22  0350   WBC 7.6 7.1 11.4   HGB 16.2 15.9 14.5   HCT 48.6 47.7 43.6   MCV 92.6 92.4 93.6    212 230       BMP:  Recent Labs     04/10/22  1705 04/11/22  0025 04/12/22  0350    138 139   K 3.3* 4.0 4.5    103 104   CO2 23 21* 24   PHOS  --  1.6* 3.1   BUN 9 9 11   CREATININE 0.7 0.7 0.8    ALB:3,BILIDIR:3,BILITOT:3,ALKPHOS:3)@    PT/INR: No results for input(s): PROTIME, INR in the last 72 hours. 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 *      Plan:       1. Cont with steroids, taper as tolerated  2. Cont with nebs, observe respiratory function  3. Cont with oxygen, decreased to 6.5 li NC, will watch saturations  4. OOB to chair, ambulate as tolerated  5. On suppressive therapy for MAC, as per ID      Time at the bedside, reviewing labs and radiographs, reviewing notes and consultations, discussing with staff and family was more than 35 minutes.       Thanks for letting us see this patient in consultation. Please contact us with any questions. Office [phone] or after hours through [employer], x [phone].

## 2022-04-12 NOTE — CARE COORDINATION
4/12/2022  Social Work Discharge Planning:Awaiting therapy evals. Pt is on 8l o2 here and uses 3-5l  [facility]ia [facility] DME. Wean o2. If unable to wean then a pulse ox test and new orders will need to be faxed to [phone]. Pt is from home with his spouse. Electronically signed by MALVIN Palacios on 4/12/2022 at 9:40 AM

## 2022-04-12 NOTE — PROGRESS NOTES
Physical Therapy    Facility/Department: [address] INTERNAL MEDICINE 2      NAME: Pernell Smart  : 1950  MRN: [id number]    Date of Service: 2022    Order received for PT evaluation. Pt is up independently in room. States he has been dealing with shortness of breath and O2 use for a while. States he knows when to take rest breaks. Denies any PT needs at this time.    [facility] PT [id number]

## 2022-04-13 LAB
ALBUMIN SERPL-MCNC: 4.1 G/DL (ref 3.5–5.2)
ALP BLD-CCNC: 74 U/L (ref 40–129)
ALT SERPL-CCNC: 10 U/L (ref 0–40)
ANION GAP SERPL CALCULATED.3IONS-SCNC: 11 MMOL/L (ref 7–16)
ANISOCYTOSIS: ABNORMAL
AST SERPL-CCNC: 10 U/L (ref 0–39)
BASOPHILS ABSOLUTE: 0 E9/L (ref 0–0.2)
BASOPHILS RELATIVE PERCENT: 0 % (ref 0–2)
BILIRUB SERPL-MCNC: 0.4 MG/DL (ref 0–1.2)
BILIRUBIN DIRECT: <0.2 MG/DL (ref 0–0.3)
BILIRUBIN, INDIRECT: ABNORMAL MG/DL (ref 0–1)
BUN BLDV-MCNC: 16 MG/DL (ref 6–23)
C-REACTIVE PROTEIN: 0.3 MG/DL (ref 0–0.4)
CALCIUM SERPL-MCNC: 9.6 MG/DL (ref 8.6–10.2)
CHLORIDE BLD-SCNC: 102 MMOL/L (ref 98–107)
CO2: 23 MMOL/L (ref 22–29)
CREAT SERPL-MCNC: 0.6 MG/DL (ref 0.7–1.2)
EOSINOPHILS ABSOLUTE: 0 E9/L (ref 0.05–0.5)
EOSINOPHILS RELATIVE PERCENT: 0 % (ref 0–6)
GFR AFRICAN AMERICAN: >60
GFR NON-AFRICAN AMERICAN: >60 ML/MIN/1.73
GLUCOSE BLD-MCNC: 102 MG/DL (ref 74–99)
HCT VFR BLD CALC: 45.2 % (ref 37–54)
HEMOGLOBIN: 15 G/DL (ref 12.5–16.5)
LACTIC ACID, SEPSIS: 2.3 MMOL/L (ref 0.5–1.9)
LYMPHOCYTES ABSOLUTE: 0.29 E9/L (ref 1.5–4)
LYMPHOCYTES RELATIVE PERCENT: 2.6 % (ref 20–42)
MAGNESIUM: 1.9 MG/DL (ref 1.6–2.6)
MCH RBC QN AUTO: 31.2 PG (ref 26–35)
MCHC RBC AUTO-ENTMCNC: 33.2 % (ref 32–34.5)
MCV RBC AUTO: 94 FL (ref 80–99.9)
METAMYELOCYTES RELATIVE PERCENT: 0.9 % (ref 0–1)
METER GLUCOSE: 111 MG/DL (ref 74–99)
METER GLUCOSE: 121 MG/DL (ref 74–99)
METER GLUCOSE: 73 MG/DL (ref 74–99)
METER GLUCOSE: 82 MG/DL (ref 74–99)
MONOCYTES ABSOLUTE: 0.2 E9/L (ref 0.1–0.95)
MONOCYTES RELATIVE PERCENT: 1.7 % (ref 2–12)
NEUTROPHILS ABSOLUTE: 9.41 E9/L (ref 1.8–7.3)
NEUTROPHILS RELATIVE PERCENT: 94.8 % (ref 43–80)
NUCLEATED RED BLOOD CELLS: 0 /100 WBC
OVALOCYTES: ABNORMAL
PDW BLD-RTO: 16.5 FL (ref 11.5–15)
PHOSPHORUS: 3.4 MG/DL (ref 2.5–4.5)
PLATELET # BLD: 231 E9/L (ref 130–450)
PMV BLD AUTO: 9.8 FL (ref 7–12)
POIKILOCYTES: ABNORMAL
POLYCHROMASIA: ABNORMAL
POTASSIUM SERPL-SCNC: 4.4 MMOL/L (ref 3.5–5)
PRO-BNP: 510 PG/ML (ref 0–125)
RBC # BLD: 4.81 E12/L (ref 3.8–5.8)
SEDIMENTATION RATE, ERYTHROCYTE: 2 MM/HR (ref 0–15)
SODIUM BLD-SCNC: 136 MMOL/L (ref 132–146)
TOTAL CK: 30 U/L (ref 20–200)
TOTAL PROTEIN: 5.9 G/DL (ref 6.4–8.3)
URINE CULTURE, ROUTINE: NORMAL
WBC # BLD: 9.8 E9/L (ref 4.5–11.5)

## 2022-04-13 PROCEDURE — 6370000000 HC RX 637 (ALT 250 FOR IP): Performed by: SPECIALIST

## 2022-04-13 PROCEDURE — 6370000000 HC RX 637 (ALT 250 FOR IP): Performed by: INTERNAL MEDICINE

## 2022-04-13 PROCEDURE — 85025 COMPLETE CBC W/AUTO DIFF WBC: CPT

## 2022-04-13 PROCEDURE — 84100 ASSAY OF PHOSPHORUS: CPT

## 2022-04-13 PROCEDURE — 2060000000 HC ICU INTERMEDIATE R&B

## 2022-04-13 PROCEDURE — 94640 AIRWAY INHALATION TREATMENT: CPT

## 2022-04-13 PROCEDURE — 6360000002 HC RX W HCPCS: Performed by: INTERNAL MEDICINE

## 2022-04-13 PROCEDURE — 36415 COLL VENOUS BLD VENIPUNCTURE: CPT

## 2022-04-13 PROCEDURE — 86140 C-REACTIVE PROTEIN: CPT

## 2022-04-13 PROCEDURE — 85651 RBC SED RATE NONAUTOMATED: CPT

## 2022-04-13 PROCEDURE — 80076 HEPATIC FUNCTION PANEL: CPT

## 2022-04-13 PROCEDURE — 83735 ASSAY OF MAGNESIUM: CPT

## 2022-04-13 PROCEDURE — 82962 GLUCOSE BLOOD TEST: CPT

## 2022-04-13 PROCEDURE — 83880 ASSAY OF NATRIURETIC PEPTIDE: CPT

## 2022-04-13 PROCEDURE — 80048 BASIC METABOLIC PNL TOTAL CA: CPT

## 2022-04-13 PROCEDURE — 83605 ASSAY OF LACTIC ACID: CPT

## 2022-04-13 PROCEDURE — 2700000000 HC OXYGEN THERAPY PER DAY

## 2022-04-13 PROCEDURE — 82550 ASSAY OF CK (CPK): CPT

## 2022-04-13 RX ORDER — METHYLPREDNISOLONE SODIUM SUCCINATE 40 MG/ML
40 INJECTION, POWDER, LYOPHILIZED, FOR SOLUTION INTRAMUSCULAR; INTRAVENOUS EVERY 12 HOURS
Status: DISCONTINUED | OUTPATIENT
Start: 2022-04-13 | End: 2022-04-14

## 2022-04-13 RX ADMIN — FERROUS SULFATE TAB 325 MG (65 MG ELEMENTAL FE) 325 MG: 325 (65 FE) TAB at 16:49

## 2022-04-13 RX ADMIN — IPRATROPIUM BROMIDE AND ALBUTEROL SULFATE 1 AMPULE: 2.5; .5 SOLUTION RESPIRATORY (INHALATION) at 15:30

## 2022-04-13 RX ADMIN — FERROUS SULFATE TAB 325 MG (65 MG ELEMENTAL FE) 325 MG: 325 (65 FE) TAB at 09:16

## 2022-04-13 RX ADMIN — SPIRONOLACTONE 12.5 MG: 25 TABLET ORAL at 09:16

## 2022-04-13 RX ADMIN — BUDESONIDE 500 MCG: 0.5 SUSPENSION RESPIRATORY (INHALATION) at 19:50

## 2022-04-13 RX ADMIN — IPRATROPIUM BROMIDE AND ALBUTEROL SULFATE 1 AMPULE: 2.5; .5 SOLUTION RESPIRATORY (INHALATION) at 12:39

## 2022-04-13 RX ADMIN — ARFORMOTEROL TARTRATE 15 MCG: 15 SOLUTION RESPIRATORY (INHALATION) at 08:26

## 2022-04-13 RX ADMIN — METHYLPREDNISOLONE SODIUM SUCCINATE 40 MG: 40 INJECTION, POWDER, LYOPHILIZED, FOR SOLUTION INTRAMUSCULAR; INTRAVENOUS at 16:49

## 2022-04-13 RX ADMIN — IPRATROPIUM BROMIDE AND ALBUTEROL SULFATE 1 AMPULE: 2.5; .5 SOLUTION RESPIRATORY (INHALATION) at 19:50

## 2022-04-13 RX ADMIN — METHYLPREDNISOLONE SODIUM SUCCINATE 40 MG: 40 INJECTION, POWDER, LYOPHILIZED, FOR SOLUTION INTRAMUSCULAR; INTRAVENOUS at 04:10

## 2022-04-13 RX ADMIN — BUDESONIDE 500 MCG: 0.5 SUSPENSION RESPIRATORY (INHALATION) at 08:26

## 2022-04-13 RX ADMIN — IPRATROPIUM BROMIDE AND ALBUTEROL SULFATE 1 AMPULE: 2.5; .5 SOLUTION RESPIRATORY (INHALATION) at 08:26

## 2022-04-13 RX ADMIN — ENOXAPARIN SODIUM 40 MG: 40 INJECTION SUBCUTANEOUS at 09:16

## 2022-04-13 RX ADMIN — PANTOPRAZOLE SODIUM 40 MG: 40 TABLET, DELAYED RELEASE ORAL at 06:10

## 2022-04-13 RX ADMIN — AZITHROMYCIN 500 MG: 250 TABLET, FILM COATED ORAL at 09:16

## 2022-04-13 RX ADMIN — TORSEMIDE 10 MG: 10 TABLET ORAL at 09:16

## 2022-04-13 RX ADMIN — ARFORMOTEROL TARTRATE 15 MCG: 15 SOLUTION RESPIRATORY (INHALATION) at 19:50

## 2022-04-13 NOTE — PLAN OF CARE
Problem: Gas Exchange - Impaired:  Goal: Levels of oxygenation will improve  Description: Levels of oxygenation will improve  4/12/2022 2237 by Alley Jones RN  Outcome: Met This Shift  4/12/2022 1333 by Cheryl Bain RN  Outcome: Ongoing     Problem: Falls - Risk of:  Goal: Will remain free from falls  Description: Will remain free from falls  4/12/2022 2237 by Alley Jones RN  Outcome: Met This Shift  4/12/2022 1333 by Cheryl Bain RN  Outcome: Met This Shift  Goal: Absence of physical injury  Description: Absence of physical injury  Outcome: Met This Shift

## 2022-04-13 NOTE — PLAN OF CARE
Patient's chart updated to reflect:      . - HF care plan, HF education points and HF discharge instructions.  -Orders: 2 gram sodium diet, daily weights, I/O.  -PCP and/or Cardiologist appointment to be scheduled within 7 days of hospital discharge.  -History of HF, not primary admission Dx.   Patient admitted for treatment of COPD    Rosemary Plummer RN BSN  Heart Failure Navigator

## 2022-04-13 NOTE — PROGRESS NOTES
[facility] [address] [facility]  [address] [facility], [address]  [facility], 17 [facility]      Pulmonary Progress Note      SUBJECTIVE:  Sitting up at bed, looking comfortable with breathing, gradual improvement of respiratory function, (?) close to baseline.  Currently on 6.5 li and still saturating 93%    OBJECTIVE    Medications    Continuous Infusions:   dextrose         Scheduled Meds:   spironolactone  12.5 mg Oral Q MWF    methylPREDNISolone  40 mg IntraVENous Q8H    budesonide  0.5 mg Nebulization BID    azithromycin  500 mg Oral Once per day on     ferrous sulfate  325 mg Oral BID WC    torsemide  10 mg Oral Once per day on     pantoprazole  40 mg Oral QAM AC    Arformoterol Tartrate  15 mcg Nebulization BID    ipratropium-albuterol  1 ampule Inhalation Q4H WA    enoxaparin  40 mg SubCUTAneous Daily    insulin lispro  0-6 Units SubCUTAneous TID WC    insulin lispro  0-3 Units SubCUTAneous Nightly       PRN Meds:benzonatate, guaiFENesin-dextromethorphan, ondansetron, potassium chloride **OR** potassium alternative oral replacement **OR** potassium chloride, acetaminophen, glucagon (rDNA), dextrose, dextrose bolus (hypoglycemia) **OR** dextrose bolus (hypoglycemia), glucose, oxyCODONE-acetaminophen **AND** oxyCODONE    Physical    VITALS:  /85   Pulse 85   Temp 98.2 °F (36.8 °C) (Oral)   Resp 18   Ht 6' (1.829 m)   Wt 191 lb 11.2 oz (87 kg)   SpO2 93%   BMI 26.00 kg/m²     24HR INTAKE/OUTPUT:      Intake/Output Summary (Last 24 hours) at 2022 0820  Last data filed at 2022 0827  Gross per 24 hour   Intake 240 ml   Output --   Net 240 ml       24HR PULSE OXIMETRY RANGE:    SpO2  Av.8 %  Min: 89 %  Max: 94 %    General appearance: alert, appears stated age and cooperative  Lungs: ronchi bilateral minimal with cough  Heart: regular rate and rhythm, S1, S2 normal, no murmur, click, rub or gallop  Abdomen: soft, non-tender; bowel sounds normal; no masses,  no organomegaly  Extremities: extremities normal, atraumatic, no cyanosis or edema  Neurologic: Mental status: Alert, oriented, thought content appropriate    Data    CBC:   Recent Labs     04/11/22  0025 04/12/22  0350 04/13/22  0701   WBC 7.1 11.4 9.8   HGB 15.9 14.5 15.0   HCT 47.7 43.6 45.2   MCV 92.4 93.6 94.0    230 231       BMP:  Recent Labs     04/10/22  1705 04/11/22  0025 04/12/22  0350    138 139   K 3.3* 4.0 4.5    103 104   CO2 23 21* 24   PHOS  --  1.6* 3.1   BUN 9 9 11   CREATININE 0.7 0.7 0.8    ALB:3,BILIDIR:3,BILITOT:3,ALKPHOS:3)@    PT/INR: No results for input(s): PROTIME, INR in the last 72 hours. 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 *      Plan:       1. Cont with steroids, taper as tolerated, possible discharge home in 1-2 days if remains stable  2. Cont with nebs, observe respiratory function  3. Cont with oxygen, decreased to 5.5 li NC, will watch saturations, will also check to see if able to maintain on 5 li with ambulation or will need his portable oxygen system changed to accomodate higher Fio2  4. OOB to chair, ambulate as tolerated  5.  On suppressive therapy for MAC, as per ID      Time at the bedside, reviewing labs and radiographs, reviewing notes and consultations, discussing with staff and family was more than 35 minutes. Thanks for letting us see this patient in consultation. Please contact us with any questions. Office [phone] or after hours through [employer], x [phone].

## 2022-04-13 NOTE — CARE COORDINATION
4/13/2022 Social Work Discharge Planning:Plan is home with spouse. Pt is now on 6l o2 here and baseline is 3.5  Via [facility] DME. Wean o2 ;otherwise a pulse ox test and new order will need to be faxed to [facility] [phone]. Electronically signed by MALVIN Sterling on 4/13/2022 at 9:36 AM

## 2022-04-13 NOTE — PROGRESS NOTES
[address] [place] [address] [facility]  [facility]  Progress Note    SUBJECTIVE:  Chief Complaint   Patient presents with    Shortness of Breath     onset yesterday, hx COPD     The patient is doing better. O2 is down to 5 L. Tolerating medications. No fever. Review of systems:  As stated above in the chief complaint, otherwise negative. Medications:  Scheduled Meds:   methylPREDNISolone  40 mg IntraVENous Q12H    spironolactone  12.5 mg Oral Q MWF    budesonide  0.5 mg Nebulization BID    azithromycin  500 mg Oral Once per day on     ferrous sulfate  325 mg Oral BID WC    torsemide  10 mg Oral Once per day on     pantoprazole  40 mg Oral QAM AC    Arformoterol Tartrate  15 mcg Nebulization BID    ipratropium-albuterol  1 ampule Inhalation Q4H WA    enoxaparin  40 mg SubCUTAneous Daily    insulin lispro  0-6 Units SubCUTAneous TID WC    insulin lispro  0-3 Units SubCUTAneous Nightly     Continuous Infusions:   dextrose       PRN Meds:benzonatate, guaiFENesin-dextromethorphan, ondansetron, potassium chloride **OR** potassium alternative oral replacement **OR** potassium chloride, acetaminophen, glucagon (rDNA), dextrose, dextrose bolus (hypoglycemia) **OR** dextrose bolus (hypoglycemia), glucose, oxyCODONE-acetaminophen **AND** oxyCODONE    OBJECTIVE:  /82   Pulse 81   Temp 98.6 °F (37 °C) (Oral)   Resp 18   Ht 6' (1.829 m)   Wt 191 lb 11.2 oz (87 kg)   SpO2 94%   BMI 26.00 kg/m²   Temp  Av.2 °F (36.8 °C)  Min: 97.9 °F (36.6 °C)  Max: 98.6 °F (37 °C)  Constitutional: The patient is awake, alert, and oriented. Sitting up in bed. Skin: Warm and dry. No rashes were noted. HEENT: Round and reactive pupils. Moist mucous membranes. No ulcerations or thrush. Neck: Supple to movements. Chest: No use of accessory muscles to breathe. Symmetrical expansion. No wheezing, crackles or rhonchi. Cardiovascular: Heart sounds rhythmic and regular.   Abdomen: Positive bowel sounds to auscultation. Extremities: No edema.   Lines: peripheral    Laboratory and Tests Review:  Lab Results   Component Value Date    WBC 9.8 04/13/2022    WBC 11.4 04/12/2022    WBC 7.1 04/11/2022    HGB 15.0 04/13/2022    HCT 45.2 04/13/2022    MCV 94.0 04/13/2022     04/13/2022     Lab Results   Component Value Date    NEUTROABS 9.41 (H) 04/13/2022    NEUTROABS 10.75 (H) 04/12/2022    NEUTROABS 6.80 04/11/2022     No results found for: [facility]  Lab Results   Component Value Date    ALT 10 04/13/2022    AST 10 04/13/2022    ALKPHOS 74 04/13/2022    BILITOT 0.4 04/13/2022     Lab Results   Component Value Date     04/13/2022    K 4.4 04/13/2022    K 3.3 04/10/2022     04/13/2022    CO2 23 04/13/2022    BUN 16 04/13/2022    CREATININE 0.6 04/13/2022    CREATININE 0.8 04/12/2022    CREATININE 0.7 04/11/2022    GFRAA >60 04/13/2022    LABGLOM >60 04/13/2022    GLUCOSE 102 04/13/2022    PROT 5.9 04/13/2022    LABALBU 4.1 04/13/2022    CALCIUM 9.6 04/13/2022    BILITOT 0.4 04/13/2022    ALKPHOS 74 04/13/2022    AST 10 04/13/2022    ALT 10 04/13/2022     Lab Results   Component Value Date    CRP 0.3 04/12/2022    CRP 0.8 (H) 04/11/2022    CRP 0.3 03/18/2022     Lab Results   Component Value Date    SEDRATE 2 04/13/2022    SEDRATE 5 04/12/2022    SEDRATE 2 04/11/2022     Radiology:      Microbiology:   Urine culture 4/11/2022: Negative  Blood culture 4/11/2022: Negative  Respiratory culture 4/11/2022: Pending  Streptococcus pneumoniae/Legionella urine Ag: negative  Respiratory panel: Negative  Rapid SARS-CoV-2: Negative    Recent Labs     04/11/22  0700   PROCAL 0.07       ASSESSMENT:  · Exacerbation COPD, improving  · MAC infection of the lungs, on chronic Azithromycin suppression    PLAN:  · Continue Azithromycin suppression    Carson Portillo MD  10:53 AM  4/13/2022

## 2022-04-13 NOTE — PROGRESS NOTES
PROGRESS  NOTE --                                                          INTERNAL  MEDICINE                                                                              I  PERSONALLY SAW , EXAMINED, AND CARED [address], 4/13/2022     LABS, XRAY ,CHART, AND MEDICATIONS  REVIEWED BY ME       Chief complaint: Shortness of breath      4/12/2022-SUBJECTIVE: Sung Lorenzana is alert awake and cooperative; oriented ×3. Denies any chest pain nausea emesis. Tolerating diet. No abdominal pain. Dyspnea not much different. Afebrile last 24 hours. SPO2 94 on 8 L nasal cannula high flow. Intake and output +480 cc. Lactic acid still elevated but improved since yesterday, currently 2.4. Fasting glucose 139. CRP 0.3 total CK 46. WBC 11.4 hemoglobin 14.5 platelets 165. ESR 5. Sputum Gram stain as follows--    Component 4/11/22 1942   Gram Stain Orderable Rare Polymorphonuclear leukocytes   Few Epithelial cells   Abundant Gram negative rods   Abundant Gram positive cocci in chains   Mixed Bacterial Morphotypes     Blood cultures negative to date. Molecular/PCR viral respiratory panel all not detected. Legionella strep antigens not detected. Cardiology consult from yesterday appreciated. proBNP greater than 3400 with complaints of shortness of breath. No evidence of pulmonary hypertension nor decreased EF on most recent 2D echo. Dyspnea most likely due to underlying severe chronic obstructive lung disease in addition to enlarged right heart and ascending aortic thoracic aneurysm. Would maintain blood pressure no less than 140 preferably 120 utilize low-dose diuretic to unload right heart. Also utilize low-dose spironolactone. ID consult from yesterday, resume azithromycin 500 mg Monday Wednesday Friday.   Social service note from today, patient may need new prescription for oxygen, previous prescription with 3 to 5 L/min currently on 8 L/min. Cardiology note from today, frustrated with chronic condition, proBNP has decreased significantly. Continue low-dose diuretic, ideally should be on beta-blocker for thoracic aortic aneurysm however due to underlying COPD attempt to maintain blood pressure without beta-blocker. Physical therapy consult from today, patient independent in room. ID note from today, continue azithromycin for suppression. 4/13/2022-  Afebrile last 24 hours. SPO2 94 on 5 L nasal cannula. Intake and output +720 cc. Lactic acid slightly high at 2.3. CK 30; proBNP much improved 510. ESR 2. Sputum culture as follows--      Component 4/11/22 1942   CULTURE, RESPIRATORY Oral Pharyngeal Eliana present Abnormal  P    Smear, Respiratory Refer to ordered Gram stain for results    Organism GNR oxidase positive Abnormal  P    CULTURE, RESPIRATORY Heavy growth   Identification and sensitivity to follow              Pulmonary note from yesterday, patient thinks he is slightly better continue steroids taper as tolerated. Continue nebulizers continue oxygen. Out of bed to chair ambulate as tolerated. Pulmonary note from today, observe her saturations with ambulation. Likely discharge in the next 24 to 48 hours if he remains stable. Taper steroids continue nebulizers. May need his portable oxygen system changed to accommodate higher FiO2. Congestive heart failure nurse note from today appreciated. Nursing note from today, 95% pulse ox at rest 5 L; 94% with ambulation. ID note from today, continue azithromycin suppression.      Objective:     PHYSICAL EXAM:    VS: /82   Pulse 81   Temp 98.6 °F (37 °C) (Oral)   Resp 18   Ht 6' (1.829 m)   Wt 191 lb 11.2 oz (87 kg)   SpO2 94%   BMI 26.00 kg/m²     Labs:   CBC:   Lab Results   Component Value Date    WBC 9.8 04/13/2022    RBC 4.81 04/13/2022    HGB 15.0 04/13/2022    HCT 45.2 04/13/2022    MCV 94.0 04/13/2022    MCH 31.2 04/13/2022    MCHC 33.2 04/13/2022    RDW 16.5 04/13/2022     04/13/2022    MPV 9.8 04/13/2022     CBC with Differential:    Lab Results   Component Value Date    WBC 9.8 04/13/2022    RBC 4.81 04/13/2022    HGB 15.0 04/13/2022    HCT 45.2 04/13/2022     04/13/2022    MCV 94.0 04/13/2022    MCH 31.2 04/13/2022    MCHC 33.2 04/13/2022    RDW 16.5 04/13/2022    NRBC 0.0 04/13/2022    METASPCT 0.9 04/13/2022    LYMPHOPCT 2.6 04/13/2022    MONOPCT 1.7 04/13/2022    MYELOPCT 0.9 04/07/2021    BASOPCT 0.0 04/13/2022    MONOSABS 0.20 04/13/2022    LYMPHSABS 0.29 04/13/2022    EOSABS 0.00 04/13/2022    BASOSABS 0.00 04/13/2022     Hemoglobin/Hematocrit:    Lab Results   Component Value Date    HGB 15.0 04/13/2022    HCT 45.2 04/13/2022     CMP:    Lab Results   Component Value Date     04/13/2022    K 4.4 04/13/2022    K 3.3 04/10/2022     04/13/2022    CO2 23 04/13/2022    BUN 16 04/13/2022    CREATININE 0.6 04/13/2022    GFRAA >60 04/13/2022    LABGLOM >60 04/13/2022    GLUCOSE 102 04/13/2022    PROT 5.9 04/13/2022    LABALBU 4.1 04/13/2022    CALCIUM 9.6 04/13/2022    BILITOT 0.4 04/13/2022    ALKPHOS 74 04/13/2022    AST 10 04/13/2022    ALT 10 04/13/2022     BMP:    Lab Results   Component Value Date     04/13/2022    K 4.4 04/13/2022    K 3.3 04/10/2022     04/13/2022    CO2 23 04/13/2022    BUN 16 04/13/2022    LABALBU 4.1 04/13/2022    CREATININE 0.6 04/13/2022    CALCIUM 9.6 04/13/2022    GFRAA >60 04/13/2022    LABGLOM >60 04/13/2022    GLUCOSE 102 04/13/2022     Hepatic Function Panel:    Lab Results   Component Value Date    ALKPHOS 74 04/13/2022    ALT 10 04/13/2022    AST 10 04/13/2022    PROT 5.9 04/13/2022    BILITOT 0.4 04/13/2022    BILIDIR <0.2 04/13/2022    IBILI see below 04/13/2022    LABALBU 4.1 04/13/2022     Ionized Calcium:  No results found for: IONCA  Magnesium:    Lab Results   Component Value Date    MG 1.9 04/13/2022     Phosphorus:    Lab Results   Component Value Date    PHOS 3.4 04/13/2022 LDH:    Lab Results   Component Value Date     01/02/2021     Uric Acid:    Lab Results   Component Value Date    LABURIC 4.6 03/15/2022     PT/INR:    Lab Results   Component Value Date    PROTIME 12.5 05/30/2019    INR 1.1 05/30/2019     Warfarin PT/INR:  No components found for: Joey Harden  PTT:    Lab Results   Component Value Date    APTT 31.4 05/30/2019   [APTT}  Troponin:    Lab Results   Component Value Date    TROPONINI <0.01 04/08/2021     Last 3 Troponin:    Lab Results   Component Value Date    TROPONINI <0.01 04/08/2021    TROPONINI <0.01 04/08/2021    TROPONINI <0.01 01/02/2021     U/A:    Lab Results   Component Value Date    COLORU Yellow 02/01/2022    PROTEINU Negative 02/01/2022    PHUR 5.5 02/01/2022    LABCAST FEW 10/02/2019    WBCUA NONE 04/07/2021    RBCUA NONE 04/07/2021    MUCUS Present 04/07/2021    BACTERIA FEW 04/07/2021    CLARITYU Clear 02/01/2022    SPECGRAV 1.020 02/01/2022    LEUKOCYTESUR Negative 02/01/2022    UROBILINOGEN 0.2 02/01/2022    BILIRUBINUR MODERATE 02/01/2022    BLOODU Negative 02/01/2022    GLUCOSEU Negative 02/01/2022     ABG:    Lab Results   Component Value Date    PH 7.428 04/10/2022    PCO2 29.0 04/10/2022    PO2 60.5 04/10/2022    HCO3 18.7 04/10/2022    BE -4.1 04/10/2022    O2SAT 91.7 04/10/2022     HgBA1c:    Lab Results   Component Value Date    LABA1C 5.3 04/11/2022     FLP:    Lab Results   Component Value Date    TRIG 43 04/11/2022    HDL 76 04/11/2022    LDLCALC 62 04/11/2022    LABVLDL 9 04/11/2022     TSH:    Lab Results   Component Value Date    TSH 0.566 04/11/2022     VITAMIN B12: No components found for: B12  FOLATE:    Lab Results   Component Value Date    FOLATE 13.1 04/11/2022     IRON:    Lab Results   Component Value Date    IRON 61 04/11/2022     Iron Saturation:  No components found for: PERCENTFE  TIBC:    Lab Results   Component Value Date    TIBC 307 04/11/2022     FERRITIN:    Lab Results   Component Value Date FERRITIN 144 04/11/2022        General appearance: Alert, Awake, Oriented times 3, no distress; nasal cannula oxygen in place  Skin: Warm and dry ; no rashes  Head: Normocephalic. No masses, lesions or tenderness noted  Eyes: Conjunctivae pink, sclera white. PERRL,EOM-I  Ears: External ears normal  Nose/Sinuses: Nares normal. Septum midline. Mucosa normal. No drainage  Oropharynx: Oropharynx clear with no exudate seen  Neck: Supple. No jugular venous distension, lymphadenopathy or thyromegaly Trachea midline  Lungs: Decreased excursion rare wheeze and rhonchi  Heart: S1 S2  Regular rate and rhythm. No rub, murmur or gallop  Abdomen: Soft, non-tender. BS normal. No masses, organomegaly; no rebound or guarding  Extremities: No edema, Peripheral pulses palpable  Musculoskeletal: Muscular strength appears intact. Neuro:  No focal motor defects ; II-XII grossly intact . 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 *      PLAN:  SEE ORDERS      RE  CHANGES AND FINDINGS   Medications reviewed with patient  GI prophylaxis  DVT prophylaxis  Consultants notes reviewed    Recheck proBNP in a.m.   Arformoterol 50 mcg twice daily  Azithromycin 500 mg Monday Wednesday Friday  Budesonide 500 mcg twice daily  Ferrous sulfate 325 mg twice daily  Low-dose sliding scale insulin  DuoNeb every 4 hours while awake  Methylprednisolone 60 mg IV every 8 hours  Spironolactone 12.5 mg Monday Wednesday Friday  Torsemide 10 mg Monday Wednesday Friday  Percocet 7.5, twice daily as needed for pain  4/13/2020  Arformoterol 50 mcg twice daily  Azithromycin 500 mg Monday Wednesday Friday  Budesonide 500 mcg twice daily  Ferrous sulfate 3 and 25 mg twice daily  Sliding scale insulin low-dose  DuoNeb every 4 hours while awake  Methylprednisolone 40 mg IV every 12 hours  Torsemide 10 mg Monday Wednesday Friday  Spironolactone 12.5 mg Monday Wednesday Friday    Discussed with patient and nursing. Anthony Jaimes DO     11:01 AM     4/13/2022    TIME > 25 MINUTES    >  50 %  OF  TIME  DISCUSSION               ------------  INFORMATION  -----------      DIET:ADULT DIET; Regular      No Known Allergies      MEDICATION SIDE EFFECTS:none       SCHEDULED MEDS:                                 Scheduled Meds:   methylPREDNISolone  40 mg IntraVENous Q12H    spironolactone  12.5 mg Oral Q MWF    budesonide  0.5 mg Nebulization BID    azithromycin  500 mg Oral Once per day on Mon Wed Fri    ferrous sulfate  325 mg Oral BID WC    torsemide  10 mg Oral Once per day on Mon Wed Fri    pantoprazole  40 mg Oral QAM AC    Arformoterol Tartrate  15 mcg Nebulization BID    ipratropium-albuterol  1 ampule Inhalation Q4H WA    enoxaparin  40 mg SubCUTAneous Daily    insulin lispro  0-6 Units SubCUTAneous TID WC    insulin lispro  0-3 Units SubCUTAneous Nightly       Continuous Infusions:   dextrose           Data:     No intake or output data in the 24 hours ending 04/13/22 1101    Wt Readings from Last 3 Encounters:   04/13/22 191 lb 11.2 oz (87 kg)   04/05/22 191 lb (86.6 kg)   03/18/22 185 lb (83.9 kg)       Labs: Additional    GLUCOSE:No results for input(s): POCGLU in the last 72 hours.     BNP:No results found for: BNP    CRP:   Recent Labs     04/11/22  0700 04/12/22  0350   CRP 0.8* 0.3       ESR:  Recent Labs     04/11/22  0025 04/12/22  0350 04/13/22  0701   SEDRATE 2 5 2 RADIOLOGY: REVIEWED AVAILABLE REPORT  XR CHEST PORTABLE   Final Result   1. Stable chronic irregular interstitial opacities bilaterally. 2. No focal airspace opacity or pleural effusion. 3. Emphysematous changes.                    [address]    11:01 AM     4/13/2022      Voice recognition software used for dictation

## 2022-04-14 VITALS
DIASTOLIC BLOOD PRESSURE: 80 MMHG | WEIGHT: 191.7 LBS | OXYGEN SATURATION: 88 % | HEART RATE: 103 BPM | BODY MASS INDEX: 25.96 KG/M2 | RESPIRATION RATE: 20 BRPM | SYSTOLIC BLOOD PRESSURE: 128 MMHG | TEMPERATURE: 98.3 F | HEIGHT: 72 IN

## 2022-04-14 LAB
ALBUMIN SERPL-MCNC: 3.8 G/DL (ref 3.5–5.2)
ALP BLD-CCNC: 83 U/L (ref 40–129)
ALT SERPL-CCNC: 11 U/L (ref 0–40)
ANION GAP SERPL CALCULATED.3IONS-SCNC: 10 MMOL/L (ref 7–16)
AST SERPL-CCNC: 11 U/L (ref 0–39)
BASOPHILS ABSOLUTE: 0 E9/L (ref 0–0.2)
BASOPHILS RELATIVE PERCENT: 0 % (ref 0–2)
BILIRUB SERPL-MCNC: 0.3 MG/DL (ref 0–1.2)
BILIRUBIN DIRECT: <0.2 MG/DL (ref 0–0.3)
BILIRUBIN, INDIRECT: ABNORMAL MG/DL (ref 0–1)
BUN BLDV-MCNC: 23 MG/DL (ref 6–23)
C-REACTIVE PROTEIN: 0.3 MG/DL (ref 0–0.4)
CALCIUM SERPL-MCNC: 9.3 MG/DL (ref 8.6–10.2)
CHLORIDE BLD-SCNC: 100 MMOL/L (ref 98–107)
CO2: 26 MMOL/L (ref 22–29)
CREAT SERPL-MCNC: 0.7 MG/DL (ref 0.7–1.2)
CULTURE, RESPIRATORY: ABNORMAL
CULTURE, RESPIRATORY: ABNORMAL
EOSINOPHILS ABSOLUTE: 0 E9/L (ref 0.05–0.5)
EOSINOPHILS RELATIVE PERCENT: 0 % (ref 0–6)
GFR AFRICAN AMERICAN: >60
GFR NON-AFRICAN AMERICAN: >60 ML/MIN/1.73
GLUCOSE BLD-MCNC: 118 MG/DL (ref 74–99)
HCT VFR BLD CALC: 45.7 % (ref 37–54)
HEMOGLOBIN: 15 G/DL (ref 12.5–16.5)
LACTIC ACID, SEPSIS: 2.2 MMOL/L (ref 0.5–1.9)
LYMPHOCYTES ABSOLUTE: 0.55 E9/L (ref 1.5–4)
LYMPHOCYTES RELATIVE PERCENT: 7 % (ref 20–42)
MAGNESIUM: 2 MG/DL (ref 1.6–2.6)
MCH RBC QN AUTO: 30.6 PG (ref 26–35)
MCHC RBC AUTO-ENTMCNC: 32.8 % (ref 32–34.5)
MCV RBC AUTO: 93.3 FL (ref 80–99.9)
METER GLUCOSE: 117 MG/DL (ref 74–99)
METER GLUCOSE: 81 MG/DL (ref 74–99)
MONOCYTES ABSOLUTE: 0.24 E9/L (ref 0.1–0.95)
MONOCYTES RELATIVE PERCENT: 2.6 % (ref 2–12)
NEUTROPHILS ABSOLUTE: 7.11 E9/L (ref 1.8–7.3)
NEUTROPHILS RELATIVE PERCENT: 90.4 % (ref 43–80)
NUCLEATED RED BLOOD CELLS: 0 /100 WBC
ORGANISM: ABNORMAL
PDW BLD-RTO: 16.2 FL (ref 11.5–15)
PHOSPHORUS: 3.6 MG/DL (ref 2.5–4.5)
PLATELET # BLD: 232 E9/L (ref 130–450)
PMV BLD AUTO: 9.6 FL (ref 7–12)
POTASSIUM SERPL-SCNC: 4.2 MMOL/L (ref 3.5–5)
RBC # BLD: 4.9 E12/L (ref 3.8–5.8)
RBC # BLD: NORMAL 10*6/UL
SEDIMENTATION RATE, ERYTHROCYTE: 1 MM/HR (ref 0–15)
SMEAR, RESPIRATORY: ABNORMAL
SODIUM BLD-SCNC: 136 MMOL/L (ref 132–146)
TOTAL CK: 26 U/L (ref 20–200)
TOTAL PROTEIN: 6 G/DL (ref 6.4–8.3)
WBC # BLD: 7.9 E9/L (ref 4.5–11.5)

## 2022-04-14 PROCEDURE — 85651 RBC SED RATE NONAUTOMATED: CPT

## 2022-04-14 PROCEDURE — 6360000002 HC RX W HCPCS: Performed by: INTERNAL MEDICINE

## 2022-04-14 PROCEDURE — 82962 GLUCOSE BLOOD TEST: CPT

## 2022-04-14 PROCEDURE — 94640 AIRWAY INHALATION TREATMENT: CPT

## 2022-04-14 PROCEDURE — 83605 ASSAY OF LACTIC ACID: CPT

## 2022-04-14 PROCEDURE — 80048 BASIC METABOLIC PNL TOTAL CA: CPT

## 2022-04-14 PROCEDURE — 83735 ASSAY OF MAGNESIUM: CPT

## 2022-04-14 PROCEDURE — 2700000000 HC OXYGEN THERAPY PER DAY

## 2022-04-14 PROCEDURE — 6370000000 HC RX 637 (ALT 250 FOR IP): Performed by: SPECIALIST

## 2022-04-14 PROCEDURE — 80076 HEPATIC FUNCTION PANEL: CPT

## 2022-04-14 PROCEDURE — 82550 ASSAY OF CK (CPK): CPT

## 2022-04-14 PROCEDURE — 85025 COMPLETE CBC W/AUTO DIFF WBC: CPT

## 2022-04-14 PROCEDURE — 36415 COLL VENOUS BLD VENIPUNCTURE: CPT

## 2022-04-14 PROCEDURE — 86140 C-REACTIVE PROTEIN: CPT

## 2022-04-14 PROCEDURE — 6370000000 HC RX 637 (ALT 250 FOR IP): Performed by: INTERNAL MEDICINE

## 2022-04-14 PROCEDURE — 84100 ASSAY OF PHOSPHORUS: CPT

## 2022-04-14 RX ORDER — IPRATROPIUM BROMIDE AND ALBUTEROL SULFATE 2.5; .5 MG/3ML; MG/3ML
3 SOLUTION RESPIRATORY (INHALATION) EVERY 6 HOURS PRN
Qty: 120 EACH | Refills: 3 | Status: SHIPPED | OUTPATIENT
Start: 2022-04-14 | End: 2022-04-14 | Stop reason: SDUPTHER

## 2022-04-14 RX ORDER — IPRATROPIUM BROMIDE AND ALBUTEROL SULFATE 2.5; .5 MG/3ML; MG/3ML
3 SOLUTION RESPIRATORY (INHALATION)
Qty: 120 EACH | Refills: 3 | Status: SHIPPED | OUTPATIENT
Start: 2022-04-14

## 2022-04-14 RX ORDER — LEVOFLOXACIN 750 MG/1
750 TABLET ORAL DAILY
Qty: 10 TABLET | Refills: 0 | Status: SHIPPED | OUTPATIENT
Start: 2022-04-14 | End: 2022-04-24

## 2022-04-14 RX ORDER — PREDNISONE 10 MG/1
TABLET ORAL
Qty: 30 TABLET | Refills: 0 | Status: SHIPPED | OUTPATIENT
Start: 2022-04-14 | End: 2022-10-27

## 2022-04-14 RX ORDER — METHYLPREDNISOLONE SODIUM SUCCINATE 40 MG/ML
40 INJECTION, POWDER, LYOPHILIZED, FOR SOLUTION INTRAMUSCULAR; INTRAVENOUS ONCE
Status: COMPLETED | OUTPATIENT
Start: 2022-04-14 | End: 2022-04-14

## 2022-04-14 RX ORDER — SPIRONOLACTONE 25 MG/1
12.5 TABLET ORAL
Qty: 30 TABLET | Refills: 3 | Status: SHIPPED | OUTPATIENT
Start: 2022-04-15 | End: 2022-10-27

## 2022-04-14 RX ADMIN — METHYLPREDNISOLONE SODIUM SUCCINATE 40 MG: 40 INJECTION, POWDER, LYOPHILIZED, FOR SOLUTION INTRAMUSCULAR; INTRAVENOUS at 07:10

## 2022-04-14 RX ADMIN — IPRATROPIUM BROMIDE AND ALBUTEROL SULFATE 1 AMPULE: 2.5; .5 SOLUTION RESPIRATORY (INHALATION) at 15:47

## 2022-04-14 RX ADMIN — ENOXAPARIN SODIUM 40 MG: 40 INJECTION SUBCUTANEOUS at 09:01

## 2022-04-14 RX ADMIN — LEVOFLOXACIN 750 MG: 500 TABLET, FILM COATED ORAL at 13:31

## 2022-04-14 RX ADMIN — FERROUS SULFATE TAB 325 MG (65 MG ELEMENTAL FE) 325 MG: 325 (65 FE) TAB at 09:01

## 2022-04-14 RX ADMIN — PANTOPRAZOLE SODIUM 40 MG: 40 TABLET, DELAYED RELEASE ORAL at 07:10

## 2022-04-14 RX ADMIN — ARFORMOTEROL TARTRATE 15 MCG: 15 SOLUTION RESPIRATORY (INHALATION) at 08:43

## 2022-04-14 RX ADMIN — BUDESONIDE 500 MCG: 0.5 SUSPENSION RESPIRATORY (INHALATION) at 08:43

## 2022-04-14 RX ADMIN — METHYLPREDNISOLONE SODIUM SUCCINATE 40 MG: 40 INJECTION, POWDER, LYOPHILIZED, FOR SOLUTION INTRAMUSCULAR; INTRAVENOUS at 13:32

## 2022-04-14 RX ADMIN — IPRATROPIUM BROMIDE AND ALBUTEROL SULFATE 1 AMPULE: 2.5; .5 SOLUTION RESPIRATORY (INHALATION) at 08:43

## 2022-04-14 RX ADMIN — IPRATROPIUM BROMIDE AND ALBUTEROL SULFATE 1 AMPULE: 2.5; .5 SOLUTION RESPIRATORY (INHALATION) at 12:15

## 2022-04-14 ASSESSMENT — PAIN SCALES - GENERAL: PAINLEVEL_OUTOF10: 0

## 2022-04-14 NOTE — CARE COORDINATION
4/14/2022  Social Work Discharge Planning: Pt is now on 5l o2 here . Continue to wean o2. Pts baseline is 3.5l home o2 via [phone]. Wean o2;otherwise, a pulse ox test and new order will need to be faxed. Pt is from home with his spouse. Electronically signed by MALVIN Sanon on 4/14/2022 at 9:32 AM      4/14/2022  Social Work Discharge Planning: faxed new o2 order to [facility].  Electronically signed by MALVIN Sanon on 4/14/2022 at 3:46 PM

## 2022-04-14 NOTE — PROGRESS NOTES
PROGRESS  NOTE --                                                          INTERNAL  MEDICINE                                                                              I  PERSONALLY SAW , EXAMINED, AND CARED [address], 4/14/2022     LABS, XRAY ,CHART, AND MEDICATIONS  REVIEWED BY ME       Chief complaint: Shortness of breath      4/12/2022-SUBJECTIVE: Milo Haskins is alert awake and cooperative; oriented ×3. Denies any chest pain nausea emesis. Tolerating diet. No abdominal pain. Dyspnea not much different. Afebrile last 24 hours. SPO2 94 on 8 L nasal cannula high flow. Intake and output +480 cc. Lactic acid still elevated but improved since yesterday, currently 2.4. Fasting glucose 139. CRP 0.3 total CK 46. WBC 11.4 hemoglobin 14.5 platelets 860. ESR 5. Sputum Gram stain as follows--    Component 4/11/22 1942   Gram Stain Orderable Rare Polymorphonuclear leukocytes   Few Epithelial cells   Abundant Gram negative rods   Abundant Gram positive cocci in chains   Mixed Bacterial Morphotypes     Blood cultures negative to date. Molecular/PCR viral respiratory panel all not detected. Legionella strep antigens not detected. Cardiology consult from yesterday appreciated. proBNP greater than 3400 with complaints of shortness of breath. No evidence of pulmonary hypertension nor decreased EF on most recent 2D echo. Dyspnea most likely due to underlying severe chronic obstructive lung disease in addition to enlarged right heart and ascending aortic thoracic aneurysm. Would maintain blood pressure no less than 140 preferably 120 utilize low-dose diuretic to unload right heart. Also utilize low-dose spironolactone. ID consult from yesterday, resume azithromycin 500 mg Monday Wednesday Friday.   Social service note from today, patient may need new prescription for oxygen, previous prescription with 3 to 5 L/min currently on 8 L/min. Cardiology note from today, frustrated with chronic condition, proBNP has decreased significantly. Continue low-dose diuretic, ideally should be on beta-blocker for thoracic aortic aneurysm however due to underlying COPD attempt to maintain blood pressure without beta-blocker. Physical therapy consult from today, patient independent in room. ID note from today, continue azithromycin for suppression. 4/13/2022-patient laying quietly in bed no chest pain no dyspnea. Hoping for discharge today. He already has appointment for second opinion [facility] scheduled for later this month. Appetite good no bowel or urinary changes. Afebrile last 24 hours. SPO2 94 on 5 L nasal cannula. Intake and output +720 cc. Lactic acid slightly high at 2.3. CK 30; proBNP much improved 510. ESR 2. Sputum culture as follows--      Component 4/11/22 1942   CULTURE, RESPIRATORY Oral Pharyngeal Eliana present Abnormal  P    Smear, Respiratory Refer to ordered Gram stain for results    Organism GNR oxidase positive Abnormal  P    CULTURE, RESPIRATORY Heavy growth   Identification and sensitivity to follow              Pulmonary note from yesterday, patient thinks he is slightly better continue steroids taper as tolerated. Continue nebulizers continue oxygen. Out of bed to chair ambulate as tolerated. Pulmonary note from today, observe her saturations with ambulation. Likely discharge in the next 24 to 48 hours if he remains stable. Taper steroids continue nebulizers. May need his portable oxygen system changed to accommodate higher FiO2. Congestive heart failure nurse note from today appreciated. Nursing note from today, 95% pulse ox at rest 5 L; 94% with ambulation. ID note from today, continue azithromycin suppression. 4/14/2022  Afebrile last 24 hours. Pulse 103 blood pressure 120/80. SPO2 88 on 5 L nasal cannula. Intake and output +1200 cc. CO2 26 lactic acid still borderline 2.2 fasting glucose 118. Protein 6.0 albumin 3.8. CRP 0.3. Liver functions normal.  WBC 7.9 hemoglobin is 15.0. ESR 1. Pulmonary note from today, can be discharged from pulmonary point of view later today. Methylprednisolone this afternoon then taper as outpatient. Nursing note from today with SPO2 as follows--    Randy Canela RN   Registered Nurse      Progress Notes       Signed   Date of Service:  4/14/2022 10:22 AM                 Signed            Pulse ox was 78% on room air at rest. Oxygen applied    Oxygen applied. Recovery pulse ox was 90% on 6 liters of oxygen while ambulating  Recovery oxygen 92% at rest on 6 LNC                 ID note from today appreciated. Begin Levaquin x10 days, reconciled can be discharged follow-up in office in 6 weeks.       Objective:     PHYSICAL EXAM:    VS: /80   Pulse 103   Temp 98.3 °F (36.8 °C) (Oral)   Resp 20   Ht 6' (1.829 m)   Wt 191 lb 11.2 oz (87 kg)   SpO2 (!) 88%   BMI 26.00 kg/m²     Labs:   CBC:   Lab Results   Component Value Date    WBC 7.9 04/14/2022    RBC 4.90 04/14/2022    HGB 15.0 04/14/2022    HCT 45.7 04/14/2022    MCV 93.3 04/14/2022    MCH 30.6 04/14/2022    MCHC 32.8 04/14/2022    RDW 16.2 04/14/2022     04/14/2022    MPV 9.6 04/14/2022     CBC with Differential:    Lab Results   Component Value Date    WBC 7.9 04/14/2022    RBC 4.90 04/14/2022    HGB 15.0 04/14/2022    HCT 45.7 04/14/2022     04/14/2022    MCV 93.3 04/14/2022    MCH 30.6 04/14/2022    MCHC 32.8 04/14/2022    RDW 16.2 04/14/2022    NRBC 0.0 04/14/2022    METASPCT 0.9 04/13/2022    LYMPHOPCT 7.0 04/14/2022    MONOPCT 2.6 04/14/2022    MYELOPCT 0.9 04/07/2021    BASOPCT 0.0 04/14/2022    MONOSABS 0.24 04/14/2022    LYMPHSABS 0.55 04/14/2022    EOSABS 0.00 04/14/2022    BASOSABS 0.00 04/14/2022     Hemoglobin/Hematocrit:    Lab Results   Component Value Date    HGB 15.0 04/14/2022    HCT 45.7 04/14/2022     CMP:    Lab Results   Component Value Date     04/14/2022    K 4.2 04/14/2022    K 3.3 04/10/2022     04/14/2022    CO2 26 04/14/2022    BUN 23 04/14/2022    CREATININE 0.7 04/14/2022    GFRAA >60 04/14/2022    LABGLOM >60 04/14/2022    GLUCOSE 118 04/14/2022    PROT 6.0 04/14/2022    LABALBU 3.8 04/14/2022    CALCIUM 9.3 04/14/2022    BILITOT 0.3 04/14/2022    ALKPHOS 83 04/14/2022    AST 11 04/14/2022    ALT 11 04/14/2022     BMP:    Lab Results   Component Value Date     04/14/2022    K 4.2 04/14/2022    K 3.3 04/10/2022     04/14/2022    CO2 26 04/14/2022    BUN 23 04/14/2022    LABALBU 3.8 04/14/2022    CREATININE 0.7 04/14/2022    CALCIUM 9.3 04/14/2022    GFRAA >60 04/14/2022    LABGLOM >60 04/14/2022    GLUCOSE 118 04/14/2022     Hepatic Function Panel:    Lab Results   Component Value Date    ALKPHOS 83 04/14/2022    ALT 11 04/14/2022    AST 11 04/14/2022    PROT 6.0 04/14/2022    BILITOT 0.3 04/14/2022    BILIDIR <0.2 04/14/2022    IBILI see below 04/14/2022    LABALBU 3.8 04/14/2022     Ionized Calcium:  No results found for: IONCA  Magnesium:    Lab Results   Component Value Date    MG 2.0 04/14/2022     Phosphorus:    Lab Results   Component Value Date    PHOS 3.6 04/14/2022     LDH:    Lab Results   Component Value Date     01/02/2021     Uric Acid:    Lab Results   Component Value Date    LABURIC 4.6 03/15/2022     PT/INR:    Lab Results   Component Value Date    PROTIME 12.5 05/30/2019    INR 1.1 05/30/2019     Warfarin PT/INR:  No components found for: PTPATWAR, PTINRWAR  PTT:    Lab Results   Component Value Date    APTT 31.4 05/30/2019   [APTT}  Troponin:    Lab Results   Component Value Date    TROPONINI <0.01 04/08/2021     Last 3 Troponin:    Lab Results   Component Value Date    TROPONINI <0.01 04/08/2021    TROPONINI <0.01 04/08/2021    TROPONINI <0.01 01/02/2021     U/A:    Lab Results   Component Value Date    COLORU Yellow 02/01/2022    PROTEINU Negative 02/01/2022    PHUR 5.5 02/01/2022    LABCAST FEW 10/02/2019    WBCUA NONE 04/07/2021    RBCUA NONE 04/07/2021    MUCUS Present 04/07/2021    BACTERIA FEW 04/07/2021    CLARITYU Clear 02/01/2022    SPECGRAV 1.020 02/01/2022    LEUKOCYTESUR Negative 02/01/2022    UROBILINOGEN 0.2 02/01/2022    BILIRUBINUR MODERATE 02/01/2022    BLOODU Negative 02/01/2022    GLUCOSEU Negative 02/01/2022     ABG:    Lab Results   Component Value Date    PH 7.428 04/10/2022    PCO2 29.0 04/10/2022    PO2 60.5 04/10/2022    HCO3 18.7 04/10/2022    BE -4.1 04/10/2022    O2SAT 91.7 04/10/2022     HgBA1c:    Lab Results   Component Value Date    LABA1C 5.3 04/11/2022     FLP:    Lab Results   Component Value Date    TRIG 43 04/11/2022    HDL 76 04/11/2022    LDLCALC 62 04/11/2022    LABVLDL 9 04/11/2022     TSH:    Lab Results   Component Value Date    TSH 0.566 04/11/2022     VITAMIN B12: No components found for: B12  FOLATE:    Lab Results   Component Value Date    FOLATE 13.1 04/11/2022     IRON:    Lab Results   Component Value Date    IRON 61 04/11/2022     Iron Saturation:  No components found for: PERCENTFE  TIBC:    Lab Results   Component Value Date    TIBC 307 04/11/2022     FERRITIN:    Lab Results   Component Value Date    FERRITIN 144 04/11/2022        General appearance: Alert, Awake, Oriented times 3, no distress; nasal cannula oxygen in place  Skin: Warm and dry ; no rashes  Head: Normocephalic. No masses, lesions or tenderness noted  Eyes: Conjunctivae pink, sclera white. PERRL,EOM-I  Ears: External ears normal  Nose/Sinuses: Nares normal. Septum midline. Mucosa normal. No drainage  Oropharynx: Oropharynx clear with no exudate seen  Neck: Supple. No jugular venous distension, lymphadenopathy or thyromegaly Trachea midline  Lungs: Decreased excursion rare wheeze and rhonchi  Heart: S1 S2  Regular rate and rhythm. No rub, murmur or gallop  Abdomen: Soft, non-tender.  BS normal. No masses, organomegaly; no rebound or guarding  Extremities: No edema, Peripheral pulses palpable  Musculoskeletal: Muscular strength appears intact. Neuro:  No focal motor defects ; II-XII grossly intact . 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 *      PLAN:  SEE ORDERS      RE  CHANGES AND FINDINGS   Medications reviewed with patient  GI prophylaxis  DVT prophylaxis  Consultants notes reviewed    Recheck proBNP in a.m.   Arformoterol 50 mcg twice daily  Azithromycin 500 mg Monday Wednesday Friday  Budesonide 500 mcg twice daily  Ferrous sulfate 325 mg twice daily  Low-dose sliding scale insulin  DuoNeb every 4 hours while awake  Methylprednisolone 60 mg IV every 8 hours  Spironolactone 12.5 mg Monday Wednesday Friday  Torsemide 10 mg Monday Wednesday Friday  Percocet 7.5, twice daily as needed for pain  4/13/2022  Arformoterol 50 mcg twice daily  Azithromycin 500 mg Monday Wednesday Friday  Budesonide 500 mcg twice daily  Ferrous sulfate 3 and 25 mg twice daily  Sliding scale insulin low-dose  DuoNeb every 4 hours while awake  Methylprednisolone 40 mg IV every 12 hours  Torsemide 10 mg Monday Wednesday Friday  Spironolactone 12.5 mg Monday Wednesday Friday 4/14/2022  Med reconciliation completed  Prescriptions written  Continue nebulizer treatments as at home  Levaquin 750 mg once daily x10 days  Spironolactone 12.5 mg Monday Wednesday Friday  Torsemide 10 mg Monday Wednesday Friday  Azithromycin 500 mg Monday Wednesday Friday suppressive dose

## 2022-04-14 NOTE — PROGRESS NOTES
[facility] [address] [facility]  [address], [address]  [facility], [address] [facility]      Pulmonary Progress Note      SUBJECTIVE:  Sitting up at bed, looking comfortable with breathing, gradual improvement of respiratory function, (?) close to baseline.  Currently on 5 li and remained similar with oxygen requirements with ambulation    OBJECTIVE    Medications    Continuous Infusions:   dextrose         Scheduled Meds:   methylPREDNISolone  40 mg IntraVENous Q12H    spironolactone  12.5 mg Oral Q MWF    budesonide  0.5 mg Nebulization BID    azithromycin  500 mg Oral Once per day on     ferrous sulfate  325 mg Oral BID WC    torsemide  10 mg Oral Once per day on     pantoprazole  40 mg Oral QAM AC    Arformoterol Tartrate  15 mcg Nebulization BID    ipratropium-albuterol  1 ampule Inhalation Q4H WA    enoxaparin  40 mg SubCUTAneous Daily    insulin lispro  0-6 Units SubCUTAneous TID WC    insulin lispro  0-3 Units SubCUTAneous Nightly       PRN Meds:benzonatate, guaiFENesin-dextromethorphan, ondansetron, potassium chloride **OR** potassium alternative oral replacement **OR** potassium chloride, acetaminophen, glucagon (rDNA), dextrose, dextrose bolus (hypoglycemia) **OR** dextrose bolus (hypoglycemia), glucose, oxyCODONE-acetaminophen **AND** oxyCODONE    Physical    VITALS:  /75   Pulse 94   Temp 97.6 °F (36.4 °C) (Oral)   Resp 18   Ht 6' (1.829 m)   Wt 191 lb 11.2 oz (87 kg)   SpO2 (!) 89%   BMI 26.00 kg/m²     24HR INTAKE/OUTPUT:      Intake/Output Summary (Last 24 hours) at 2022 0835  Last data filed at 2022 1239  Gross per 24 hour   Intake 480 ml   Output --   Net 480 ml       24HR PULSE OXIMETRY RANGE:    SpO2  Av %  Min: 89 %  Max: 94 %    General appearance: alert, appears stated age and cooperative  Lungs: ronchi bilateral minimal with cough  Heart: regular rate and rhythm, S1, S2 normal, no murmur, click, rub or gallop  Abdomen: soft, non-tender; bowel sounds normal; no masses,  no organomegaly  Extremities: extremities normal, atraumatic, no cyanosis or edema  Neurologic: Mental status: Alert, oriented, thought content appropriate    Data    CBC:   Recent Labs     04/12/22  0350 04/13/22  0701 04/14/22  0330   WBC 11.4 9.8 7.9   HGB 14.5 15.0 15.0   HCT 43.6 45.2 45.7   MCV 93.6 94.0 93.3    231 232       BMP:  Recent Labs     04/12/22  0350 04/13/22  0701 04/14/22  0330    136 136   K 4.5 4.4 4.2    102 100   CO2 24 23 26   PHOS 3.1 3.4 3.6   BUN 11 16 23   CREATININE 0.8 0.6* 0.7    ALB:3,BILIDIR:3,BILITOT:3,ALKPHOS:3)@    PT/INR: No results for input(s): PROTIME, INR in the last 72 hours. ABG:   No results for input(s): PH, PO2, PCO2, HCO3, BE, O2SAT, METHB, O2HB, COHB, O2CON, HHB, THB in the last 72 hours. 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 *      Plan:       1. Can be discharged from pulmonary pov later today  2. Cont with steroids, will have solumedrol given early afternoon and should be able to continue with taper as out-pt  3. Cont with nebs, observe respiratory function  4. Cont with oxygen, at baseline  5.  On suppressive therapy for MAC, as per ID      Time at the bedside, reviewing labs and radiographs, reviewing notes and consultations, discussing with staff and family was more than 28 minutes. Thanks for letting us see this patient in consultation. Please contact us with any questions. Office [phone] or after hours through [facility], [phone].

## 2022-04-14 NOTE — PROGRESS NOTES
[address] [place] [address] [facility]  [facility]  Progress Note    SUBJECTIVE:  Chief Complaint   Patient presents with    Shortness of Breath     onset yesterday, hx COPD     The patient is doing better. O2 is down to 5 L. Tolerating medications. No fever. Review of systems:  As stated above in the chief complaint, otherwise negative. Medications:  Scheduled Meds:   methylPREDNISolone  40 mg IntraVENous Once    levoFLOXacin  750 mg Oral Daily    spironolactone  12.5 mg Oral Q MWF    budesonide  0.5 mg Nebulization BID    azithromycin  500 mg Oral Once per day on     ferrous sulfate  325 mg Oral BID WC    torsemide  10 mg Oral Once per day on     pantoprazole  40 mg Oral QAM AC    Arformoterol Tartrate  15 mcg Nebulization BID    ipratropium-albuterol  1 ampule Inhalation Q4H WA    enoxaparin  40 mg SubCUTAneous Daily    insulin lispro  0-6 Units SubCUTAneous TID WC    insulin lispro  0-3 Units SubCUTAneous Nightly     Continuous Infusions:   dextrose       PRN Meds:benzonatate, guaiFENesin-dextromethorphan, ondansetron, potassium chloride **OR** potassium alternative oral replacement **OR** potassium chloride, acetaminophen, glucagon (rDNA), dextrose, dextrose bolus (hypoglycemia) **OR** dextrose bolus (hypoglycemia), glucose, oxyCODONE-acetaminophen **AND** oxyCODONE    OBJECTIVE:  /85   Pulse 77   Temp 98.1 °F (36.7 °C) (Oral)   Resp 20   Ht 6' (1.829 m)   Wt 191 lb 11.2 oz (87 kg)   SpO2 91%   BMI 26.00 kg/m²   Temp  Av.8 °F (36.6 °C)  Min: 97.6 °F (36.4 °C)  Max: 98.1 °F (36.7 °C)  Constitutional: The patient is awake, alert, and oriented. Sitting up in bed. Skin: Warm and dry. No rashes were noted. HEENT: Round and reactive pupils. Moist mucous membranes. No ulcerations or thrush. Neck: Supple to movements. Chest: No use of accessory muscles to breathe. Symmetrical expansion. No wheezing, crackles or rhonchi.   Cardiovascular: Heart sounds rhythmic and regular. Abdomen: Positive bowel sounds to auscultation. Extremities: No edema. Lines: peripheral    Laboratory and Tests Review:  Lab Results   Component Value Date    WBC 7.9 04/14/2022    WBC 9.8 04/13/2022    WBC 11.4 04/12/2022    HGB 15.0 04/14/2022    HCT 45.7 04/14/2022    MCV 93.3 04/14/2022     04/14/2022     Lab Results   Component Value Date    NEUTROABS 7.11 04/14/2022    NEUTROABS 9.41 (H) 04/13/2022    NEUTROABS 10.75 (H) 04/12/2022     No results found for: CRP  Lab Results   Component Value Date    ALT 11 04/14/2022    AST 11 04/14/2022    ALKPHOS 83 04/14/2022    BILITOT 0.3 04/14/2022     Lab Results   Component Value Date     04/14/2022    K 4.2 04/14/2022    K 3.3 04/10/2022     04/14/2022    CO2 26 04/14/2022    BUN 23 04/14/2022    CREATININE 0.7 04/14/2022    CREATININE 0.6 04/13/2022    CREATININE 0.8 04/12/2022    GFRAA >60 04/14/2022    LABGLOM >60 04/14/2022    GLUCOSE 118 04/14/2022    PROT 6.0 04/14/2022    LABALBU 3.8 04/14/2022    CALCIUM 9.3 04/14/2022    BILITOT 0.3 04/14/2022    ALKPHOS 83 04/14/2022    AST 11 04/14/2022    ALT 11 04/14/2022     Lab Results   Component Value Date    CRP 0.3 04/14/2022    CRP 0.3 04/13/2022    CRP 0.3 04/12/2022     Lab Results   Component Value Date    SEDRATE 1 04/14/2022    SEDRATE 2 04/13/2022    SEDRATE 5 04/12/2022     Radiology:      Microbiology:   Urine culture 4/11/2022: Negative  Blood culture 4/11/2022: Negative  Respiratory culture 4/11/2022: Heavy Pseudomonas aeruginosa  Streptococcus pneumoniae/Legionella urine Ag: negative  Respiratory panel: Negative  Rapid SARS-CoV-2: Negative    No results for input(s): PROCAL in the last 72 hours. ASSESSMENT:  · Exacerbation COPD, improving  · MAC infection of the lungs, on chronic Azithromycin suppression  · Pseudomonas bronchitis versus colonization of the airways.   He has a nonproductive sputum    PLAN:  · Continue Azithromycin suppression  · Start Levofloxacin x10 days.   Reconciled  · The patient can be discharged from ID standpoint  · Follow-up in the office in 6 weeks    Claudia Cazares MD  12:40 PM  4/14/2022

## 2022-04-14 NOTE — PROGRESS NOTES
Received call from case management that the pre-auth for the duoneb was denied. Patient states that he has some at home. Paged Dr. Davina Andrews. Awaiting returned call. Gagan Hay RN      [id number]- spoke with Dr. Davina Andrews. Okay for discharge. Patient to call Dr. Zoey Chaparro office tomorrow or Monday to get that worked out and more ordered.   Gagan Hay RN

## 2022-04-14 NOTE — PROGRESS NOTES
Pulse ox was 78% on room air at rest. Oxygen applied    Oxygen applied.  Recovery pulse ox was 90% on 6 liters of oxygen while ambulating  Recovery oxygen 92% at rest on 6 LNC

## 2022-04-15 ENCOUNTER — CARE COORDINATION (OUTPATIENT)
Dept: CASE MANAGEMENT | Age: 72
End: 2022-04-15

## 2022-04-15 DIAGNOSIS — J44.1 COPD EXACERBATION (HCC): Primary | ICD-10-CM

## 2022-04-15 PROCEDURE — 1111F DSCHRG MED/CURRENT MED MERGE: CPT | Performed by: INTERNAL MEDICINE

## 2022-04-15 NOTE — CARE COORDINATION
[facility] Transitions Initial Follow Up Call    Call within 2 business days of discharge: Yes    Patient: Sabrina Quintero Patient : 1950   MRN: [id number]  Reason for Admission: COPD exacerbation  Discharge Date: 22 RARS: Readmission Risk Score: 20 ( )      Last Discharge [facility]       Complaint Diagnosis Description Type Department Provider    4/10/22 Shortness of Breath COPD exacerbation ([facility] [address] . .. ED to Hosp-Admission (Discharged) (ADMITTED) [address], DO; Gene De León-... Spoke with pt for initial care transition call post hospital discharge. Pt admitted on 4/10/22 with COPD exacerbation after presenting to the hospital with worsening sob. Pt states that he is feeling \"pretty good\" today and is \"almost\" at his baseline from a pulmonary standpoint. Pt reports to baseline sob with activity. Pt denies any increased sob. Pt has baseline cough with no reports of worsening. Pt denies wheezing, chest tightness, or chest congestion. Pt has a home pulse oximeter but has not checked levels this morning. Pt remains on O2 @ 6L cont. Confirmed that pt is wearing and on correct liter flow. Pt reports he has all O2 equipment needed at home. Receives O2 from [facility] [employer] [facility] [place]. Pt has a nebulizer at home. Pt has Duonebs, Pulmicort, and Performist treatments at home and is taking as prescribed. Reviewed COPD zones and discussed when to contact his pcp or pulmonary with worsening in his condition. CTN reviewed medications in full. 1111F entered, as  pcp. Pt has all new prescriptions ordered at discharge and is taking as prescribed. Noted pt does not have Protonix at home. CTN noted that rx was at [facility] and written in 2022. Has 3 refills. Pt does not have at home and is requesting that remaining refills be transferred to [facility]. CTN called [facility] and spoke with [employer]. Requested he contact [facility] to request Protonix rx be transferred.  [employer] Clarissa states he will call today and take care of this. Pt is aware that rx will be transferred. Pt has rest of medications at home and filled. Reviewed recommended HFU appts. Pt has a previously scheduled f/u with his pcp for Monday that was previously scheduled. Pt is aware and plans on attending appt. CTN will route a message to the pcp's office requesting they change visit type to a HFU. CTN offered to assist scheduling his other f/u appts with pulm, cardiology, and ID. Pt declines assistance stating he prefers to call to schedule. Pt aware that ID recommended f/u in 6 weeks. Pt voiced understanding. Pt did not voice any other needs/concerns. Pt was agreeable to ongoing outreaches from this CTN. Facility: [address] [place] [address]    Non-face-to-face services provided:  Scheduled appointment with PCP-4/18/22  Scheduled appointment with Andrea Castaneda, Dr. Joey Ferro, Dr. Mikala Diaz prefers to call to schedule  Obtained and reviewed discharge summary and/or continuity of care documents  Assessment and support for treatment adherence and medication management-1111F entered. Transitions of Care Initial Call    Challenges to be reviewed by the provider   Additional needs identified to be addressed with provider: No  none             Method of communication with provider : none      Advance Care Planning:   Does patient have an Advance Directive: Health care decision maker information reviewed. Was this a readmission? Yes  Patient stated reason for admission: sob  Patients top risk factors for readmission: lack of knowledge about disease  medical condition-COPD, MAC, CHF, chronic low back pain, CHF  medication management  multiple health system providers  polypharmacy  utilization of services    Care Transition Nurse (CTN) contacted the patient by telephone to perform post hospital discharge assessment. Provided introduction to self, and explanation of the CTN role.      CTN reviewed discharge instructions, medical action plan and red flags with patient who verbalized understanding. Patient given an opportunity to ask questions and does not have any further questions or concerns at this time. Were discharge instructions available to patient? Yes. Reviewed appropriate site of care based on symptoms and resources available to patient including: PCP  Specialist  Urgent care clinics  When to call 911. The patient agrees to contact the PCP office for questions related to their healthcare. Medication reconciliation was performed with patient, who verbalizes understanding of administration of home medications. CTN provided contact information. Plan for follow-up call in 5-7 days based on severity of symptoms and risk factors. Plan for next call: symptom management-any worsening in COPD? follow up appointment-Has pt scheduled f/u appts with pulm, card, and ID? Did pt attend HFU appt with pcp? Any new changes? Care Transitions 24 Hour Call    Schedule Follow Up Appointment with PCP: Completed  Do you have a copy of your discharge instructions?: Yes  Do you have all of your prescriptions and are they filled?: Yes  Have you been contacted by a [facility] Pharmacist?: No  Have you scheduled your follow up appointment?: Yes  How are you going to get to your appointment?: Car - family or friend to transport  Were you discharged with any Home Care or Post Acute Services: No  Post Acute Services:  Outpatient/Community Services (Comment: 1/9/21-oxygen through [facility] [place])  Do you feel like you have everything you need to keep you well at home?: Yes  Care Transitions Interventions         Follow Up  Future Appointments   Date Time Provider Tan Miranda   4/18/2022  2:00 PM MD WARD García [facility]   5/3/2022  9:30 AM Claudia Cazares MD [facility]   6/10/2022 10:45 AM DO ANJANA Jackson [facility]   7/6/2022  2:00 PM MIGUE Gordon - NP [facility]   12/20/2022  1:30 PM MD WARD García [facility]       Denice Vaughan, RN

## 2022-04-15 NOTE — CARE COORDINATION
Tressa denied per insurance via Cover my Meds ref # [id number]- Dr. Jeaneth Iraheta notified per nursing- patient to followup with Dr. Willie Lowery- attempted to fax med denial to Dr. Jeaneth Iraheta- no answer via fax- denial faxed to Dr. Mayito Oviedo office.  Pt reported to nursing he had medication at home and was discharged 4/14 pm. Jhonathan Lloyd RN case manager

## 2022-04-15 NOTE — PROGRESS NOTES
CLINICAL PHARMACY NOTE: MEDS TO BEDS    Total # of Prescriptions Filled: 3   The following medications were delivered to the patient:  · Spironolactone 25 mg  · Levo 750 mg  · Prednisone 10 mg    Additional Documentation:

## 2022-04-16 LAB
BLOOD CULTURE, ROUTINE: NORMAL
CULTURE, BLOOD 2: NORMAL

## 2022-04-18 ENCOUNTER — OFFICE VISIT (OUTPATIENT)
Dept: PRIMARY CARE CLINIC | Age: 72
End: 2022-04-18
Payer: MEDICARE

## 2022-04-18 VITALS
TEMPERATURE: 98 F | OXYGEN SATURATION: 89 % | SYSTOLIC BLOOD PRESSURE: 100 MMHG | BODY MASS INDEX: 26.01 KG/M2 | DIASTOLIC BLOOD PRESSURE: 70 MMHG | HEIGHT: 72 IN | WEIGHT: 192 LBS | HEART RATE: 82 BPM | RESPIRATION RATE: 18 BRPM

## 2022-04-18 DIAGNOSIS — A31.0 PULMONARY MYCOBACTERIUM AVIUM COMPLEX (MAC) INFECTION (HCC): ICD-10-CM

## 2022-04-18 DIAGNOSIS — J96.11 CHRONIC RESPIRATORY FAILURE WITH HYPOXIA (HCC): ICD-10-CM

## 2022-04-18 DIAGNOSIS — M51.36 LUMBAR DEGENERATIVE DISC DISEASE: ICD-10-CM

## 2022-04-18 DIAGNOSIS — J44.1 ACUTE EXACERBATION OF CHRONIC OBSTRUCTIVE PULMONARY DISEASE (COPD) (HCC): Primary | ICD-10-CM

## 2022-04-18 DIAGNOSIS — G89.4 CHRONIC PAIN SYNDROME: ICD-10-CM

## 2022-04-18 DIAGNOSIS — G89.29 CHRONIC BILATERAL LOW BACK PAIN WITHOUT SCIATICA: ICD-10-CM

## 2022-04-18 DIAGNOSIS — M54.50 CHRONIC BILATERAL LOW BACK PAIN WITHOUT SCIATICA: ICD-10-CM

## 2022-04-18 DIAGNOSIS — D84.9 IMMUNOCOMPROMISED (HCC): ICD-10-CM

## 2022-04-18 PROBLEM — A31.2: Status: RESOLVED | Noted: 2019-08-15 | Resolved: 2022-04-18

## 2022-04-18 PROBLEM — I50.31 ACUTE HEART FAILURE WITH PRESERVED EJECTION FRACTION (HCC): Status: RESOLVED | Noted: 2021-04-08 | Resolved: 2022-04-18

## 2022-04-18 PROBLEM — D75.1 ERYTHROCYTOSIS DUE TO HYPOXEMIA: Status: RESOLVED | Noted: 2022-01-14 | Resolved: 2022-04-18

## 2022-04-18 PROBLEM — E61.1 IRON DEFICIENCY: Status: RESOLVED | Noted: 2022-02-03 | Resolved: 2022-04-18

## 2022-04-18 PROBLEM — J96.21 ACUTE AND CHRONIC RESPIRATORY FAILURE WITH HYPOXIA (HCC): Status: RESOLVED | Noted: 2017-10-12 | Resolved: 2022-04-18

## 2022-04-18 PROBLEM — J96.21 ACUTE ON CHRONIC RESPIRATORY FAILURE WITH HYPOXIA (HCC): Status: RESOLVED | Noted: 2019-10-02 | Resolved: 2022-04-18

## 2022-04-18 PROCEDURE — 99496 TRANSJ CARE MGMT HIGH F2F 7D: CPT | Performed by: INTERNAL MEDICINE

## 2022-04-18 PROCEDURE — 1111F DSCHRG MED/CURRENT MED MERGE: CPT | Performed by: INTERNAL MEDICINE

## 2022-04-18 NOTE — PROGRESS NOTES
Geena Jenniferclarissa  4/18/22     Chief Complaint   Patient presents with    Follow-Up from [facility]     4/10/22 -4/14/22 SOB /CHEST HEAVINESS        No Known Allergies     Current Outpatient Medications   Medication Sig Dispense Refill    predniSONE (DELTASONE) 10 MG tablet 4 tabs daily x3 days, 3 tabs daily x3 days, 2 tabs daily x3 days, 1 tab daily x3 days 30 tablet 0    levoFLOXacin (LEVAQUIN) 750 MG tablet Take 1 tablet by mouth daily for 10 days 10 tablet 0    spironolactone (ALDACTONE) 25 MG tablet Take 0.5 tablets by mouth three times a week 30 tablet 3    ipratropium-albuterol (DUONEB) 0.5-2.5 (3) MG/3ML SOLN nebulizer solution Inhale 3 mLs into the lungs every 4 hours (while awake) 120 each 3    azithromycin (ZITHROMAX) 250 MG tablet Take 500 mg by mouth every other day Indications: Mild Upper Respiratory Infection      budesonide (PULMICORT) 0.5 MG/2ML nebulizer suspension Take 2 mLs by nebulization 2 times daily 60 each 3    formoterol (PERFOROMIST) 20 MCG/2ML nebulizer solution Take 2 mLs by nebulization 2 times daily 120 mL 5    torsemide (DEMADEX) 10 MG tablet Take 1 tablet by mouth three times a week 30 tablet 3    ferrous sulfate (IRON 325) 325 (65 Fe) MG tablet Take 1 tablet by mouth 2 times daily (with meals) 30 tablet 3    pantoprazole (PROTONIX) 40 MG tablet Take 1 tablet by mouth every morning (before breakfast) 30 tablet 3    Probiotic Product (PROBIOTIC-10 PO) Take by mouth daily       Handicap Placard MISC by Does not apply route Patient cannot walk 200 ft without stopping to rest.    Expiration 5 yrs. 1 each 0    Respiratory Therapy Supplies (FULL KIT NEBULIZER SET) MISC Use as directed with nebulized medication. 1 each 0    oxyCODONE-acetaminophen (PERCOCET) 7.5-325 MG per tablet Take 1 tablet by mouth 2 times daily as needed for Pain. No current facility-administered medications for this visit. HPI: Patient comes in for 7-day [facility] hospital follow-up visit.   He was Assessment:    Lisa Stahl was seen today for follow-up from hospital.    Diagnoses and all orders for this visit:    Acute exacerbation of chronic obstructive pulmonary disease (COPD) ([facility] [address]  -     MT DISCHARGE MEDS RECONCILED W/ CURRENT OUTPATIENT MED LIST    Pulmonary Mycobacterium avium complex (MAC) infection ([facility] [address] for which he is treated and followed by infectious disease. Chronic respiratory failure with hypoxia (HCC)    Lumbar degenerative disc disease    Chronic pain syndrome    Chronic bilateral low back pain without sciatica    Immunocompromised ([facility] [address] probably due to long-term use of corticosteroids. Discussion Notes: Continue all of his usual meds and supplements the same as listed on his med list.  He will follow-up closely with his pulmonologist and infectious disease specialist for treatment of his severe COPD and chronic hypoxic respiratory failure and Mycobacterium avium lung infection. Return here as needed or in 3 months for routine follow-up visit.

## 2022-04-19 NOTE — DISCHARGE SUMMARY
DISCHARGE SUMMARY  Patient ID:  Tory Sepulveda  [id number]  73 y.o.  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 Posada Sic I am asked admit and follow. History obtained from patient as well as extensive review electronic record. He was seen in pulmonary office 4/5/2022 \"breathing still not good\". Wearing 6 L nasal cannula at night, takes DuoNeb, uses Trelegy but still short of breath. \"Family wanted to go to [facility] for [facility] opinion\". He presented to the ED yesterday with shortness of breath complaints worse with exertion. Upon arrival \"he appears in mild to moderate acute respiratory distress\". Patient saturating at 87-88% with 10 L high flow nasal cannula. He was afebrile on admission. In the ED SPO2 83 on 6 L nasal cannula. Chest x-ray in the ED as follows--          FINDINGS:   Chronic irregular interstitial opacities bilaterally notable in the lung   bases.  Areas of scarring or subsegmental atelectasis also in lung bases.    Hyperinflation of both lungs notable in upper lung fields.  No pneumothorax. The heart is normal in size.        Impression:       1. Stable chronic irregular interstitial opacities bilaterally. 2. No focal airspace opacity or pleural effusion. 3. Emphysematous changes.           Patient seen earlier today by pulmonary service, 8 L high flow nasal cannula feeling slightly better minimal sputum production. Increase budesonide to 500 mcg twice daily. ID note from today appreciated; resume azithromycin suppressive dose. He was most recently discharged from this hospital 3/14/2022, with diagnosis of acute exacerbation of COPD. Patient was discharged from this hospital 2022 due to acute respiratory failure with hypoxia. 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 --Admission to the hospital 2021 due to acute heart failure with preserved ejection fraction  --Hospitalization 2021 due to acute respiratory failure due to SARS-CoV-2  --Found to have thoracic ascending aortic aneurysm 11/15/2018; proximal ascending aorta 3.8 cm  --Patient admitted 2019 due to parainfluenza virus pneumonia.  The above was extremely slow resolving with left upper lung infiltrate.  He then underwent bronchoscopy with BAL. --Fungal results became available approximately 2019, positive Fungitell. --On 2019 sensitivities were available and ID recommended starting intravenous amikacin 1400 mg IV daily; rifampin 600 mg by mouth daily; clarithromycin 1000 mg/day ethambutol 1200 mg daily by mouth.  --Mother  age 80, father  age 80. --Patient quit smoking , 92-pack-year history  --Patient has been vaccinated for COVID-19 and received booster   --Denies any prior history of rheumatic fever scarlet fever polio diphtheria cancer.  Patient is known glucose intolerance and is currently on sliding scale insulin coverage; he is on IV methylprednisolone .   --Screening colonoscopy 2013 with beta-blocker. Physical therapy consult from today, patient independent in room. ID note from today, continue azithromycin for suppression.      4/13/2022-patient laying quietly in bed no chest pain no dyspnea. Hoping for discharge today. He already has appointment for second opinion [facility] scheduled for later this month. Appetite good no bowel or urinary changes. Afebrile last 24 hours. SPO2 94 on 5 L nasal cannula. Intake and output +720 cc. Lactic acid slightly high at 2.3. CK 30; proBNP much improved 510. ESR 2. Sputum culture as follows--        Component 4/11/22 1942   CULTURE, RESPIRATORY Oral Pharyngeal Eliana present Abnormal  P    Smear, Respiratory Refer to ordered Gram stain for results    Organism GNR oxidase positive Abnormal  P    CULTURE, RESPIRATORY Heavy growth   Identification and sensitivity to follow                Pulmonary note from yesterday, patient thinks he is slightly better continue steroids taper as tolerated. Continue nebulizers continue oxygen. Out of bed to chair ambulate as tolerated. Pulmonary note from today, observe her saturations with ambulation. Likely discharge in the next 24 to 48 hours if he remains stable. Taper steroids continue nebulizers. May need his portable oxygen system changed to accommodate higher FiO2. Congestive heart failure nurse note from today appreciated. Nursing note from today, 95% pulse ox at rest 5 L; 94% with ambulation. ID note from today, continue azithromycin suppression.      4/14/2022  Afebrile last 24 hours. Pulse 103 blood pressure 120/80. SPO2 88 on 5 L nasal cannula. Intake and output +1200 cc. CO2 26 lactic acid still borderline 2.2 fasting glucose 118. Protein 6.0 albumin 3.8. CRP 0.3. Liver functions normal.  WBC 7.9 hemoglobin is 15.0. ESR 1. Pulmonary note from today, can be discharged from pulmonary point of view later today. Methylprednisolone this afternoon then taper as outpatient.   Nursing note from today with SPO2 as follows--         Johann Holguin RN   Registered Nurse       Progress Notes        Signed    Date of Service:  4/14/2022 10:22 AM             Hospital orders: PLAN:  Medications discussed with patient  GI prophylaxis  DVT prophylaxis  Consultants notes reviewed  Arformoterol 15 mcg twice daily  Azithromycin 500 mg Monday Wednesday Friday  Budesonide 500 mg twice daily nebulizer  Low-dose sliding scale insulin; fasting glucose 266  Lactic acid elevated 4.4 however proBNP done in the ED was 3486; previously 1632 on 3/15/2022. Today, proBNP 1736. Further IV fluids may be contraindicated at this time. Methylprednisolone 60 mg IV every 8 hours  DuoNeb every 4 hours while awake  Roxicodone 7.5 mg twice daily as needed  Cardiology consult regarding marked increase in proBNP on admission; he had 2D echo done 3/2022      RE  CHANGES AND FINDINGS   Medications reviewed with patient  GI prophylaxis  DVT prophylaxis  Consultants notes reviewed    Recheck proBNP in a.m.   Arformoterol 50 mcg twice daily  Azithromycin 500 mg Monday Wednesday Friday  Budesonide 500 mcg twice daily  Ferrous sulfate 325 mg twice daily  Low-dose sliding scale insulin  DuoNeb every 4 hours while awake  Methylprednisolone 60 mg IV every 8 hours  Spironolactone 12.5 mg Monday Wednesday Friday  Torsemide 10 mg Monday Wednesday Friday  Percocet 7.5, twice daily as needed for pain  4/13/2022  Arformoterol 50 mcg twice daily  Azithromycin 500 mg Monday Wednesday Friday  Budesonide 500 mcg twice daily  Ferrous sulfate 3 and 25 mg twice daily  Sliding scale insulin low-dose  DuoNeb every 4 hours while awake  Methylprednisolone 40 mg IV every 12 hours  Torsemide 10 mg Monday Wednesday Friday  Spironolactone 12.5 mg Monday Wednesday Friday      Instructions at discharge:    Med reconciliation completed  Prescriptions written  Continue nebulizer treatments as at home  Levaquin 750 mg once daily x10 days  Spironolactone 12.5 mg Monday Wednesday Friday  Torsemide 10 mg Monday Wednesday Friday  Azithromycin 500 mg Monday Wednesday Friday suppressive dose for MAC  Methylprednisolone 40 mg IV 1 time today  Prednisone  weaning dose, starting tomorrow    Condition at DISCHARGE : Jeff [id number]     Discharged to: Home    Discharge Instructions: Medications reviewed with patient    Consults:cardiology, pulmonary/intensive care and ID     Past Medical Hx :   Past Medical History:   Diagnosis Date    Acute and chronic respiratory failure with hypoxia ([address] 10/12/2017    Acute heart failure with preserved ejection fraction ([address] 4/8/2021    Acute respiratory disease due to severe acute respiratory syndrome coronavirus 2 (SARS-CoV-2) 01/01/2021    Acute respiratory failure with hypoxia (HCC) 11/12/2018    Bullous emphysema ([address] 11/12/2018    Chronic back pain     Degeneration of umbar intervertebral disc    Colon cancer screening     COPD (chronic obstructive pulmonary disease) ([address]     Coronavirus infection 2/2/2022    Coronavirus 229E    Cough with hemoptysis 04/23/2019    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 ([address] 08/15/2019    First seen by ID; treatment started 9/4/2019    Emphysema lung ([address]     Glucose intolerance 06/10/2019    Hilar adenopathy 06/10/2019    Hypophosphatemia 06/10/2019    Immunocompromised ([address] 2/3/2022    Infection due to parainfluenza virus 3 06/10/2019    Iron deficiency 2/3/2022    Left upper lobe pneumonia 10/12/2017    Pleural effusion on right 10/03/2019    Pulmonary emphysema with fibrosis of lung ([address] 11/15/2018    Pulmonary Mycobacterium avium complex (MAC) infection ([address] 07/12/2019    BAL results finally completed this date    Rhinovirus infection 10/16/2020    Right lower lobe pneumonia 11/15/2018    Recurrent 4/23/19    S/P lumbar fusion 06/01/2019    Thoracic ascending aortic aneurysm ([address] 11/15/2018    Proximal ascending aorta; 3.8 cm; 11/15/18 Past Surgical Hx :  Past Surgical History:   Procedure Laterality Date    ABSCESS DRAINAGE  2006    X2 RECTAL ABSCESS    BRONCHOSCOPY N/A 6/12/2019    BRONCHOSCOPY BRUSHINGS performed by Darrel Jimenez MD at [facility] N/A 10/7/2019    BRONCHOSCOPY DIAGNOSTIC OR CELL [address] ONLY performed by Darrel Jimenez MD at [facility] [facility] N/A 4/12/2021    BRONCHOSCOPY performed by Darrel Jimenez MD at [address] COLONOSCOPY  11/18/2013    HC INSERT PICC CATH, 5/> YRS  4/10/2021         LUMBAR FUSION  03/2019    [facility]       Labs:   CBC:   Lab Results   Component Value Date    WBC 7.9 04/14/2022    RBC 4.90 04/14/2022    HGB 15.0 04/14/2022    HCT 45.7 04/14/2022    MCV 93.3 04/14/2022    MCH 30.6 04/14/2022    MCHC 32.8 04/14/2022    RDW 16.2 04/14/2022     04/14/2022    MPV 9.6 04/14/2022     CBC with Differential:    Lab Results   Component Value Date    WBC 7.9 04/14/2022    RBC 4.90 04/14/2022    HGB 15.0 04/14/2022    HCT 45.7 04/14/2022     04/14/2022    MCV 93.3 04/14/2022    MCH 30.6 04/14/2022    MCHC 32.8 04/14/2022    RDW 16.2 04/14/2022    NRBC 0.0 04/14/2022    METASPCT 0.9 04/13/2022    LYMPHOPCT 7.0 04/14/2022    MONOPCT 2.6 04/14/2022    MYELOPCT 0.9 04/07/2021    BASOPCT 0.0 04/14/2022    MONOSABS 0.24 04/14/2022    LYMPHSABS 0.55 04/14/2022    EOSABS 0.00 04/14/2022    BASOSABS 0.00 04/14/2022     Hemoglobin/Hematocrit:    Lab Results   Component Value Date    HGB 15.0 04/14/2022    HCT 45.7 04/14/2022     CMP:    Lab Results   Component Value Date     04/14/2022    K 4.2 04/14/2022    K 3.3 04/10/2022     04/14/2022    CO2 26 04/14/2022    BUN 23 04/14/2022    CREATININE 0.7 04/14/2022    GFRAA >60 04/14/2022    LABGLOM >60 04/14/2022    GLUCOSE 118 04/14/2022    PROT 6.0 04/14/2022    LABALBU 3.8 04/14/2022    CALCIUM 9.3 04/14/2022    BILITOT 0.3 04/14/2022    ALKPHOS 83 04/14/2022    AST 11 04/14/2022    ALT 11 04/14/2022     BMP:    Lab Results   Component Value Date     04/14/2022    K 4.2 04/14/2022    K 3.3 04/10/2022     04/14/2022    CO2 26 04/14/2022    BUN 23 04/14/2022    LABALBU 3.8 04/14/2022    CREATININE 0.7 04/14/2022    CALCIUM 9.3 04/14/2022    GFRAA >60 04/14/2022    LABGLOM >60 04/14/2022    GLUCOSE 118 04/14/2022     Hepatic Function Panel:    Lab Results   Component Value Date    ALKPHOS 83 04/14/2022    ALT 11 04/14/2022    AST 11 04/14/2022    PROT 6.0 04/14/2022    BILITOT 0.3 04/14/2022    BILIDIR <0.2 04/14/2022    IBILI see below 04/14/2022    LABALBU 3.8 04/14/2022     Ionized Calcium:  No results found for: IONCA  Magnesium:    Lab Results   Component Value Date    MG 2.0 04/14/2022     Phosphorus:    Lab Results   Component Value Date    PHOS 3.6 04/14/2022     LDH:    Lab Results   Component Value Date     01/02/2021     Uric Acid:    Lab Results   Component Value Date    LABURIC 4.6 03/15/2022     PT/INR:    Lab Results   Component Value Date    PROTIME 12.5 05/30/2019    INR 1.1 05/30/2019     Warfarin PT/INR:  No components found for: PTPATWAR, PTINRWAR  PTT:    Lab Results   Component Value Date    APTT 31.4 05/30/2019   [APTT}  Troponin:    Lab Results   Component Value Date    TROPONINI <0.01 04/08/2021     Last 3 Troponin:    Lab Results   Component Value Date    TROPONINI <0.01 04/08/2021    TROPONINI <0.01 04/08/2021    TROPONINI <0.01 01/02/2021     U/A:    Lab Results   Component Value Date    COLORU Yellow 02/01/2022    PROTEINU Negative 02/01/2022    PHUR 5.5 02/01/2022    LABCAST FEW 10/02/2019    WBCUA NONE 04/07/2021    RBCUA NONE 04/07/2021    MUCUS Present 04/07/2021    BACTERIA FEW 04/07/2021    CLARITYU Clear 02/01/2022    SPECGRAV 1.020 02/01/2022    LEUKOCYTESUR Negative 02/01/2022    UROBILINOGEN 0.2 02/01/2022    BILIRUBINUR MODERATE 02/01/2022    BLOODU Negative 02/01/2022    GLUCOSEU Negative 02/01/2022     ABG:    Lab Results   Component Value Date the last 72 hours. Lipid Profile:   Lab Results   Component Value Date    TRIG 43 04/11/2022    HDL 76 04/11/2022    LDLCALC 62 04/11/2022    CHOL 147 04/11/2022        XR CHEST PORTABLE    Result Date: 4/10/2022  EXAMINATION: ONE XRAY VIEW OF THE CHEST 4/10/2022 1:24 pm COMPARISON: March 14, 2022 HISTORY: ORDERING SYSTEM PROVIDED HISTORY: sob TECHNOLOGIST PROVIDED HISTORY: Reason for exam:->sob FINDINGS: Chronic irregular interstitial opacities bilaterally notable in the lung bases. Areas of scarring or subsegmental atelectasis also in lung bases. Hyperinflation of both lungs notable in upper lung fields. No pneumothorax. The heart is normal in size. 1. Stable chronic irregular interstitial opacities bilaterally. 2. No focal airspace opacity or pleural effusion. 3. Emphysematous changes.        Discharge Exam:  See progress note from today    Discharge Medication List as of 4/14/2022  3:33 PM      START taking these medications    Details   predniSONE (DELTASONE) 10 MG tablet 4 tabs daily x3 days, 3 tabs daily x3 days, 2 tabs daily x3 days, 1 tab daily x3 days, Disp-30 tablet, R-0Normal      levoFLOXacin (LEVAQUIN) 750 MG tablet Take 1 tablet by mouth daily for 10 days, Disp-10 tablet, R-0Normal      spironolactone (ALDACTONE) 25 MG tablet Take 0.5 tablets by mouth three times a week, Disp-30 tablet, R-3Normal         CONTINUE these medications which have CHANGED    Details   ipratropium-albuterol (DUONEB) 0.5-2.5 (3) MG/3ML SOLN nebulizer solution Inhale 3 mLs into the lungs every 4 hours (while awake), Disp-120 each, R-3Normal         CONTINUE these medications which have NOT CHANGED    Details   azithromycin (ZITHROMAX) 250 MG tablet Take 500 mg by mouth every other day Indications: Mild Upper Respiratory InfectionHistorical Med      budesonide (PULMICORT) 0.5 MG/2ML nebulizer suspension Take 2 mLs by nebulization 2 times daily, Disp-60 each, R-3Normal      formoterol (PERFOROMIST) 20 MCG/2ML nebulizer solution Take 2 mLs by nebulization 2 times daily, Disp-120 mL, R-5Normal      torsemide (DEMADEX) 10 MG tablet Take 1 tablet by mouth three times a week, Disp-30 tablet, R-3Normal      benzonatate (TESSALON) 100 MG capsule Take 100 mg by mouth 3 times daily as needed for CoughHistorical Med      guaiFENesin-dextromethorphan (ROBITUSSIN DM) 100-10 MG/5ML syrup Take 5 mLs by mouth 4 times daily as needed for CoughHistorical Med      ferrous sulfate (IRON 325) 325 (65 Fe) MG tablet Take 1 tablet by mouth 2 times daily (with meals), Disp-30 tablet, R-3Normal      pantoprazole (PROTONIX) 40 MG tablet Take 1 tablet by mouth every morning (before breakfast), Disp-30 tablet, R-3Normal      Probiotic Product (PROBIOTIC-10 PO) Take by mouth daily Historical Med      Handicap Placard [facility] Starting Thu 8/19/2021, Disp-1 each, R-0, PrintPatient cannot walk 200 ft without stopping to rest.   Expiration 5 yrs. Respiratory Therapy Supplies (FULL KIT NEBULIZER SET) MISC Disp-1 each, R-0, PrintUse as directed with nebulized medication. oxyCODONE-acetaminophen (PERCOCET) 7.5-325 MG per tablet Take 1 tablet by mouth 2 times daily as needed for Pain. Historical Med         STOP taking these medications       etodolac (LODINE XL) 500 MG extended release tablet Comments:   Reason for Stopping:         Fluticasone-Umeclidin-Vilant (TRELEGY ELLIPTA IN) Comments:   Reason for Stopping:               Time Spent on discharge is more than 30 minutes --      TIME  INCLUDES TIME THAT WAS  SPENT WITH DISCHARGE PAPERS, MEDICATION REVIEW, MEDICATION RECONCILIATION,   PRESCRIPTIONS, CHART REVIEW, PATIENT EXAM , FINAL PROGRESS NOTE, DISCUSSION OF FINDINGS WITH PATIENT AND AVAILABLE FAMILY , AND DICTATION  WHERE NEEDED ; Home Health Care [facility]) FORMS COMPLETED ; N-17  COMPLETION ;  H&P UPDATED ; DURABLE EQUIPMENT FORMS.      Active Hospital Problems    Diagnosis     Acute exacerbation of chronic obstructive pulmonary disease (COPD) ([facility] [J44.1]     Immun[facility]promised ([email or web address] [D84.9]     Pulmonary Mycobacterium avium complex (MAC) infection ([facility] [A31.0]     Glucose intolerance [E74.39]     Thoracic ascending aortic aneurysm ([facility] [I71.2]        Signed:    Hallie Casillas DO      4/19/2022    3:07 PM    Voice recognition software use for dictation

## 2022-04-20 ENCOUNTER — CARE COORDINATION (OUTPATIENT)
Dept: CASE MANAGEMENT | Age: 72
End: 2022-04-20

## 2022-04-20 NOTE — CARE COORDINATION
[facility] Transitions Follow Up Call    2022    Patient: Maurice Turner  Patient : 1950   MRN: [id number]  Reason for Admission: COPD exacerbation  Discharge Date: 22 RARS: Readmission Risk Score: 20 ( )         Attempted to reach the patient for subsequent Care Transition call. Message left with CTN's contact information requesting return phone call.      Will attempt outreach again      Follow Up  Future Appointments   Date Time Provider Tan Miranda   5/3/2022  9:30 AM Daniel Martinez MD [facility]INFBD [facility] INF   6/10/2022 10:45 AM Abe Mortimer, DO [address] ENT [facility]   2022  2:00 PM MIGUE Salamanca NP AFL PULM [facility] PULM [facility]   2022  1:00 PM MD WARD Marsh [facility]   2022  1:30 PM MD WARD Marsh RN

## 2022-04-23 NOTE — DISCHARGE SUMMARY
Physician Discharge Summary     Patient ID:  Clark Sarah  [id number]  14 y.o.  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 ([address]    Acute heart failure with preserved ejection fraction ([address]      Discharge Diagnoses: Principal Problem (Resolved):    Acute and chronic respiratory failure with hypoxia (HCC)  Active Problems:    COPD (chronic obstructive pulmonary disease) (HCC)    Bullous emphysema (HCC)    Thoracic ascending aortic aneurysm (HCC)    Glucose intolerance    Immunocompromised (HCC)    Acute exacerbation of chronic obstructive pulmonary disease (COPD) ([address]  Resolved Problems:    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 Patient was noted to be in any respiratory distress. He was also hypoxic. He was admitted. IV diuretics were started. He was also given empiric antibiotics for possible pneumonia. He was noted to have lactic acidosis as well. He was given IV steroids for possible COPD exacerbation. Cardiology was consulted. CT scan of the chest revealed advanced COPD changes. Echocardiogram showed preserved ejection fraction. Patient improved with conservative therapy and was discharged home. CT CHEST WO CONTRAST   Final Result   Advanced centrilobular emphysema with extensive scarring, fibrosis and   bullous changes. Resolution of the previously noted infiltrative mass in the left upper lobe   with the extensive  spiculated and infiltrative nodules and masses some of   which are new especially the 1 in the right middle lobe which are   indeterminate for developing malignancy. Close surveillance according to   [employer] society guidelines is recommended. Borderline cardiac size with the tiny pericardial effusion and coronary   artery calcification. XR CHEST PORTABLE   Final Result   Worsening pneumonia at the right base and left mid lung. Other stable   findings as enumerated above.              Results for orders placed or performed during the hospital encounter of 04/10/22 (from the past 336 hour(s))   COVID-19, Rapid    Collection Time: 04/10/22  1:16 PM    Specimen: Nasopharyngeal Swab   Result Value Ref Range    SARS-CoV-2, NAAT Not Detected Not Detected   CBC with Auto Differential    Collection Time: 04/10/22  1:16 PM   Result Value Ref Range    WBC 7.6 4.5 - 11.5 E9/L    RBC 5.25 3.80 - 5.80 E12/L    Hemoglobin 16.2 12.5 - 16.5 g/dL    Hematocrit 48.6 37.0 - 54.0 %    MCV 92.6 80.0 - 99.9 fL    MCH 30.9 26.0 - 35.0 pg    MCHC 33.3 32.0 - 34.5 %    RDW 16.2 (H) 11.5 - 15.0 fL    Platelets 991 518 - 342 E9/L    MPV 9.8 7.0 - 12.0 fL    Neutrophils % 75.0 43.0 - 80.0 %    Immature Granulocytes % 0.4 0.0 - 5.0 %    Lymphocytes % 16.0 (L) 20.0 - 42.0 %    Monocytes % 5.9 2.0 - 12.0 %    Eosinophils % 2.2 0.0 - 6.0 %    Basophils % 0.5 0.0 - 2.0 %    Neutrophils Absolute 5.70 1.80 - 7.30 E9/L    Immature Granulocytes # 0.03 E9/L    Lymphocytes Absolute 1.22 (L) 1.50 - 4.00 E9/L    Monocytes Absolute 0.45 0.10 - 0.95 E9/L    Eosinophils Absolute 0.17 0.05 - 0.50 E9/L    Basophils Absolute 0.04 0.00 - 0.20 E9/L   Brain Natriuretic Peptide    Collection Time: 04/10/22  1:16 PM   Result Value Ref Range    Pro-BNP 3,486 (H) 0 - 125 pg/mL   EKG 12 Lead    Collection Time: 04/10/22  1:44 PM   Result Value Ref Range    Ventricular Rate 76 BPM    Atrial Rate 76 BPM    P-R Interval 174 ms    QRS Duration 86 ms    Q-T Interval 392 ms    QTc Calculation (Bazett) 441 ms    P Axis 110 degrees    R Axis 98 degrees    T Axis 74 degrees   SPECIMEN REJECTION    Collection Time: 04/10/22  1:57 PM   Result Value Ref Range    Rejected Test TRP5 CMPX     Reason for Rejection see below    Blood Gas, Arterial    Collection Time: 04/10/22  4:17 PM   Result Value Ref Range    Date Analyzed [id number]     Time Analyzed 1617     Source: Blood Arterial     pH, Blood Gas 7.428 7.350 - 7.450    PCO2 29.0 (L) 35.0 - 45.0 mmHg    PO2 60.5 (L) 75.0 - 100.0 mmHg    HCO3 18.7 (L) 22.0 - 26.0 mmol/L    B.E. -4.1 (L) -3.0 - 3.0 mmol/L    O2 Sat 91.7 (L) 92.0 - 98.5 %    O2Hb 89.8 (L) 94.0 - 97.0 %    COHb 1.8 (H) 0.0 - 1.5 %    MetHb 0.3 0.0 - 1.5 %    O2 Content 19.7 mL/dL    HHb 8.1 (H) 0.0 - 5.0 %    tHb (est) 15.6 11.5 - 16.5 g/dL    Mode NC- 6 L     Date Of Collection      Time Collected      Pt Temp 37.0 C     ID [id number]     Lab [id number]     Critical(s) Notified .  No Critical Values    Comprehensive Metabolic Panel w/ Reflex to MG    Collection Time: 04/10/22  5:05 PM   Result Value Ref Range    Sodium 139 132 - 146 mmol/L    Potassium reflex Magnesium 3.3 (L) 3.5 - 5.0 mmol/L    Chloride 104 98 - 107 mmol/L    CO2 23 22 - 29 mmol/L    Anion Gap 12 7 - 16 mmol/L    Glucose 94 74 - 99 mg/dL    BUN 9 6 - 23 mg/dL    CREATININE 0.7 0.7 - 1.2 mg/dL    GFR Non-African American >60 >=60 mL/min/1.73    GFR African American >60     Calcium 9.1 8.6 - 10.2 mg/dL    Total Protein 6.4 6.4 - 8.3 g/dL    Albumin 4.0 3.5 - 5.2 g/dL    Total Bilirubin 0.6 0.0 - 1.2 mg/dL    Alkaline Phosphatase 76 40 - 129 U/L    ALT 13 0 - 40 U/L    AST 16 0 - 39 U/L   Magnesium    Collection Time: 04/10/22  5:05 PM   Result Value Ref Range    Magnesium 1.8 1.6 - 2.6 mg/dL   Troponin    Collection Time: 04/10/22  8:42 PM   Result Value Ref Range    Troponin, High Sensitivity <6 0 - 11 ng/L   POCT Glucose    Collection Time: 04/10/22 11:09 PM   Result Value Ref Range    Meter Glucose 229 (H) 74 - 99 mg/dL   Culture, Blood 2    Collection Time: 04/11/22 12:25 AM    Specimen: Blood   Result Value Ref Range    Culture, Blood 2 5 Days no growth    Basic Metabolic Panel    Collection Time: 04/11/22 12:25 AM   Result Value Ref Range    Sodium 138 132 - 146 mmol/L    Potassium 4.0 3.5 - 5.0 mmol/L    Chloride 103 98 - 107 mmol/L    CO2 21 (L) 22 - 29 mmol/L    Anion Gap 14 7 - 16 mmol/L    Glucose 266 (H) 74 - 99 mg/dL    BUN 9 6 - 23 mg/dL    CREATININE 0.7 0.7 - 1.2 mg/dL    GFR Non-African American >60 >=60 mL/min/1.73    GFR African American >60     Calcium 9.4 8.6 - 10.2 mg/dL   Brain Natriuretic Peptide    Collection Time: 04/11/22 12:25 AM   Result Value Ref Range    Pro-BNP 1,736 (H) 0 - 125 pg/mL   Calcium, Ionized    Collection Time: 04/11/22 12:25 AM   Result Value Ref Range    Calcium, Ion 1.30 1.15 - 1.33 mmol/L   CBC with Auto Differential    Collection Time: 04/11/22 12:25 AM   Result Value Ref Range    WBC 7.1 4.5 - 11.5 E9/L    RBC 5.16 3.80 - 5.80 E12/L    Hemoglobin 15.9 12.5 - 16.5 g/dL    Hematocrit 47.7 37.0 - 54.0 %    MCV 92.4 80.0 - 99.9 fL    MCH 30.8 26.0 - 35.0 pg    MCHC 33.3 32.0 - 34.5 %    RDW 16.0 (H) 11.5 - 15.0 fL    Platelets 619 466 - 498 E9/L    MPV 9.4 7.0 - 12.0 fL    Neutrophils % 96.3 (H) 43.0 - 80.0 %    Immature Granulocytes % 0.4 0.0 - 5.0 %    Lymphocytes % 3.0 (L) 20.0 - 42.0 %    Monocytes % 0.3 (L) 2.0 - 12.0 %    Eosinophils % 0.0 0.0 - 6.0 %    Basophils % 0.0 0.0 - 2.0 %    Neutrophils Absolute 6.80 1.80 - 7.30 E9/L    Immature Granulocytes # 0.03 E9/L    Lymphocytes Absolute 0.21 (L) 1.50 - 4.00 E9/L Monocytes Absolute 0.02 (L) 0.10 - 0.95 E9/L    Eosinophils Absolute 0.00 (L) 0.05 - 0.50 E9/L    Basophils Absolute 0.00 0.00 - 0.20 E9/L    Anisocytosis 1+    CK    Collection Time: 04/11/22 12:25 AM   Result Value Ref Range    Total CK 83 20 - 200 U/L   Magnesium    Collection Time: 04/11/22 12:25 AM   Result Value Ref Range    Magnesium 1.7 1.6 - 2.6 mg/dL   Lipid Panel    Collection Time: 04/11/22 12:25 AM   Result Value Ref Range    Cholesterol, Total 147 0 - 199 mg/dL    Triglycerides 43 0 - 149 mg/dL    HDL 76 >40 mg/dL    LDL Calculated 62 0 - 99 mg/dL    VLDL Cholesterol Calculated 9 mg/dL   Lactate, Sepsis    Collection Time: 04/11/22 12:25 AM   Result Value Ref Range    Lactic Acid, Sepsis 4.4 (HH) 0.5 - 1.9 mmol/L   Hepatic Function Panel    Collection Time: 04/11/22 12:25 AM   Result Value Ref Range    Total Protein 6.5 6.4 - 8.3 g/dL    Albumin 4.0 3.5 - 5.2 g/dL    Alkaline Phosphatase 77 40 - 129 U/L    ALT 13 0 - 40 U/L    AST 16 0 - 39 U/L    Total Bilirubin 0.5 0.0 - 1.2 mg/dL    Bilirubin, Direct <0.2 0.0 - 0.3 mg/dL    Bilirubin, Indirect see below 0.0 - 1.0 mg/dL   Hemoglobin A1C    Collection Time: 04/11/22 12:25 AM   Result Value Ref Range    Hemoglobin A1C 5.3 4.0 - 5.6 %   Vitamin B12 & Folate    Collection Time: 04/11/22 12:25 AM   Result Value Ref Range    Vitamin B-12 464 211 - 946 pg/mL    Folate 13.1 4.8 - 24.2 ng/mL   TSH    Collection Time: 04/11/22 12:25 AM   Result Value Ref Range    TSH 0.566 0.270 - 4.200 uIU/mL   Troponin    Collection Time: 04/11/22 12:25 AM   Result Value Ref Range    Troponin, High Sensitivity <6 0 - 11 ng/L   Phosphorus    Collection Time: 04/11/22 12:25 AM   Result Value Ref Range    Phosphorus 1.6 (L) 2.5 - 4.5 mg/dL   Sedimentation Rate    Collection Time: 04/11/22 12:25 AM   Result Value Ref Range    Sed Rate 2 0 - 15 mm/Hr   D-Dimer, Quantitative    Collection Time: 04/11/22 12:25 AM   Result Value Ref Range    D-Dimer, Quant 241 ng/mL DDU   Culture, Blood 1    Collection Time: 04/11/22 12:30 AM    Specimen: Blood   Result Value Ref Range    Blood Culture, Routine 5 Days no growth    Culture, Urine    Collection Time: 04/11/22  1:42 AM    Specimen: Urine, clean catch   Result Value Ref Range    Urine Culture, Routine Growth not present    Legionella antigen, urine    Collection Time: 04/11/22  1:42 AM    Specimen: Urine, clean catch   Result Value Ref Range    L. pneumophila Serogp 1 Ur Ag       Presumptive Negative -suggesting no recent or current infections  with Legionella pneumophila serogroup 1. Infection to Legionella cannot be ruled out since other serogroups  and species may cause infection, antigen may not be present in  early infection, or level of antigen may be below the  detection limit. Normal Range: Presumptive Negative     Strep Pneumoniae Antigen    Collection Time: 04/11/22  1:42 AM    Specimen: Urine, clean catch   Result Value Ref Range    STREP PNEUMONIAE ANTIGEN, URINE       Presumptive negative- suggests no current or recent  pneumococcal infection.   Infection due to Strep pneumoniae cannot be  ruled out since the antigen present in the sample  may be below the detection limit of the test.  Normal Range:Presumptive Negative     POCT Glucose    Collection Time: 04/11/22  5:44 AM   Result Value Ref Range    Meter Glucose 145 (H) 74 - 99 mg/dL   Respiratory Panel, Molecular, with COVID-19 (Restricted: peds pts or suitable admitted adults)    Collection Time: 04/11/22  6:07 AM    Specimen: Nasopharyngeal   Result Value Ref Range    Adenovirus by PCR Not Detected Not Detected    Bordetella parapertussis by PCR Not Detected Not Detected    Bordetella pertussis by PCR Not Detected Not Detected    Chlamydophilia pneumoniae by PCR Not Detected Not Detected    Coronavirus 229E by PCR Not Detected Not Detected    Coronavirus HKU1 by PCR Not Detected Not Detected    Coronavirus NL63 by PCR Not Detected Not Detected    Coronavirus OC43 by PCR Not Detected Not Detected    SARS-CoV-2, PCR Not Detected Not Detected    Human Metapneumovirus by PCR Not Detected Not Detected    Human Rhinovirus/Enterovirus by PCR Not Detected Not Detected    Influenza A by PCR Not Detected Not Detected    Influenza B by PCR Not Detected Not Detected    Mycoplasma pneumoniae by PCR Not Detected Not Detected    Parainfluenza Virus 1 by PCR Not Detected Not Detected    Parainfluenza Virus 2 by PCR Not Detected Not Detected    Parainfluenza Virus 3 by PCR Not Detected Not Detected    Parainfluenza Virus 4 by PCR Not Detected Not Detected    Respiratory Syncytial Virus by PCR Not Detected Not Detected   C-Reactive Protein    Collection Time: 04/11/22  7:00 AM   Result Value Ref Range    CRP 0.8 (H) 0.0 - 0.4 mg/dL   Iron and TIBC    Collection Time: 04/11/22  7:00 AM   Result Value Ref Range    Iron 61 59 - 158 mcg/dL    TIBC 307 250 - 450 mcg/dL    Iron Saturation 20 20 - 55 %   Ferritin    Collection Time: 04/11/22  7:00 AM   Result Value Ref Range    Ferritin 144 ng/mL   Procalcitonin    Collection Time: 04/11/22  7:00 AM   Result Value Ref Range    Procalcitonin 0.07 0.00 - 0.08 ng/mL   POCT Glucose    Collection Time: 04/11/22 11:26 AM   Result Value Ref Range    Meter Glucose 171 (H) 74 - 99 mg/dL   POCT Glucose    Collection Time: 04/11/22  3:23 PM   Result Value Ref Range    Meter Glucose 164 (H) 74 - 99 mg/dL   POCT Glucose    Collection Time: 04/11/22  7:36 PM   Result Value Ref Range    Meter Glucose 143 (H) 74 - 99 mg/dL   Culture, Respiratory    Collection Time: 04/11/22  7:42 PM    Specimen: Sputum Expectorated   Result Value Ref Range    CULTURE, RESPIRATORY Oral Pharyngeal Eliana present (A)     Smear, Respiratory Refer to ordered Gram stain for results     Organism Pseudomonas aeruginosa (A)     CULTURE, RESPIRATORY Heavy growth        Susceptibility    Pseudomonas aeruginosa - BACTERIAL SUSCEPTIBILITY PANEL BY LOREE     cefepime ^2 Sensitive mcg/mL Ceftolozane/Tazobactam (Zerbaxa) ^0.5 Sensitive mcg/mL     gentamicin ^2 Sensitive mcg/mL     levofloxacin <=^0.12 Sensitive mcg/mL     meropenem <=^0.25 Sensitive mcg/mL     piperacillin-tazobactam <=^4 Sensitive mcg/mL     tobramycin <=^1 Sensitive mcg/mL   Gram Stain    Collection Time: 04/11/22  7:42 PM    Specimen: Sputum Induced   Result Value Ref Range    Gram Stain Orderable       Rare Polymorphonuclear leukocytes  Few Epithelial cells  Abundant Gram negative rods  Abundant Gram positive cocci in chains  Mixed Bacterial Morphotypes     Basic Metabolic Panel    Collection Time: 04/12/22  3:50 AM   Result Value Ref Range    Sodium 139 132 - 146 mmol/L    Potassium 4.5 3.5 - 5.0 mmol/L    Chloride 104 98 - 107 mmol/L    CO2 24 22 - 29 mmol/L    Anion Gap 11 7 - 16 mmol/L    Glucose 139 (H) 74 - 99 mg/dL    BUN 11 6 - 23 mg/dL    CREATININE 0.8 0.7 - 1.2 mg/dL    GFR Non-African American >60 >=60 mL/min/1.73    GFR African American >60     Calcium 9.6 8.6 - 10.2 mg/dL   CBC with Auto Differential    Collection Time: 04/12/22  3:50 AM   Result Value Ref Range    WBC 11.4 4.5 - 11.5 E9/L    RBC 4.66 3.80 - 5.80 E12/L    Hemoglobin 14.5 12.5 - 16.5 g/dL    Hematocrit 43.6 37.0 - 54.0 %    MCV 93.6 80.0 - 99.9 fL    MCH 31.1 26.0 - 35.0 pg    MCHC 33.3 32.0 - 34.5 %    RDW 16.2 (H) 11.5 - 15.0 fL    Platelets 395 709 - 003 E9/L    MPV 9.8 7.0 - 12.0 fL    Neutrophils % 94.3 (H) 43.0 - 80.0 %    Immature Granulocytes % 0.5 0.0 - 5.0 %    Lymphocytes % 3.1 (L) 20.0 - 42.0 %    Monocytes % 2.0 2.0 - 12.0 %    Eosinophils % 0.0 0.0 - 6.0 %    Basophils % 0.1 0.0 - 2.0 %    Neutrophils Absolute 10.75 (H) 1.80 - 7.30 E9/L    Immature Granulocytes # 0.06 E9/L    Lymphocytes Absolute 0.35 (L) 1.50 - 4.00 E9/L    Monocytes Absolute 0.23 0.10 - 0.95 E9/L    Eosinophils Absolute 0.00 (L) 0.05 - 0.50 E9/L    Basophils Absolute 0.01 0.00 - 0.20 E9/L    RBC Morphology Normal    CK    Collection Time: 04/12/22  3:50 AM   Result Value Ref Range    Total CK 46 20 - 200 U/L   C-Reactive Protein    Collection Time: 04/12/22  3:50 AM   Result Value Ref Range    CRP 0.3 0.0 - 0.4 mg/dL   Magnesium    Collection Time: 04/12/22  3:50 AM   Result Value Ref Range    Magnesium 1.9 1.6 - 2.6 mg/dL   Lactate, Sepsis    Collection Time: 04/12/22  3:50 AM   Result Value Ref Range    Lactic Acid, Sepsis 2.4 (H) 0.5 - 1.9 mmol/L   Hepatic Function Panel    Collection Time: 04/12/22  3:50 AM   Result Value Ref Range    Total Protein 5.9 (L) 6.4 - 8.3 g/dL    Albumin 3.9 3.5 - 5.2 g/dL    Alkaline Phosphatase 70 40 - 129 U/L    ALT 10 0 - 40 U/L    AST 10 0 - 39 U/L    Total Bilirubin 0.3 0.0 - 1.2 mg/dL    Bilirubin, Direct <0.2 0.0 - 0.3 mg/dL    Bilirubin, Indirect see below 0.0 - 1.0 mg/dL   Phosphorus    Collection Time: 04/12/22  3:50 AM   Result Value Ref Range    Phosphorus 3.1 2.5 - 4.5 mg/dL   Sedimentation Rate    Collection Time: 04/12/22  3:50 AM   Result Value Ref Range    Sed Rate 5 0 - 15 mm/Hr   POCT Glucose    Collection Time: 04/12/22  4:51 AM   Result Value Ref Range    Meter Glucose 116 (H) 74 - 99 mg/dL   POCT Glucose    Collection Time: 04/12/22 10:47 AM   Result Value Ref Range    Meter Glucose 149 (H) 74 - 99 mg/dL   POCT Glucose    Collection Time: 04/12/22  4:01 PM   Result Value Ref Range    Meter Glucose 138 (H) 74 - 99 mg/dL   POCT Glucose    Collection Time: 04/12/22  8:07 PM   Result Value Ref Range    Meter Glucose 110 (H) 74 - 99 mg/dL   POCT Glucose    Collection Time: 04/13/22  6:10 AM   Result Value Ref Range    Meter Glucose 111 (H) 74 - 99 mg/dL   Basic Metabolic Panel    Collection Time: 04/13/22  7:01 AM   Result Value Ref Range    Sodium 136 132 - 146 mmol/L    Potassium 4.4 3.5 - 5.0 mmol/L    Chloride 102 98 - 107 mmol/L    CO2 23 22 - 29 mmol/L    Anion Gap 11 7 - 16 mmol/L    Glucose 102 (H) 74 - 99 mg/dL    BUN 16 6 - 23 mg/dL    CREATININE 0.6 (L) 0.7 - 1.2 mg/dL    GFR Non-African American >60 >=60 mL/min/1.73 GFR African American >60     Calcium 9.6 8.6 - 10.2 mg/dL   CBC with Auto Differential    Collection Time: 04/13/22  7:01 AM   Result Value Ref Range    WBC 9.8 4.5 - 11.5 E9/L    RBC 4.81 3.80 - 5.80 E12/L    Hemoglobin 15.0 12.5 - 16.5 g/dL    Hematocrit 45.2 37.0 - 54.0 %    MCV 94.0 80.0 - 99.9 fL    MCH 31.2 26.0 - 35.0 pg    MCHC 33.2 32.0 - 34.5 %    RDW 16.5 (H) 11.5 - 15.0 fL    Platelets 324 080 - 346 E9/L    MPV 9.8 7.0 - 12.0 fL    Neutrophils % 94.8 (H) 43.0 - 80.0 %    Lymphocytes % 2.6 (L) 20.0 - 42.0 %    Monocytes % 1.7 (L) 2.0 - 12.0 %    Eosinophils % 0.0 0.0 - 6.0 %    Basophils % 0.0 0.0 - 2.0 %    Neutrophils Absolute 9.41 (H) 1.80 - 7.30 E9/L    Lymphocytes Absolute 0.29 (L) 1.50 - 4.00 E9/L    Monocytes Absolute 0.20 0.10 - 0.95 E9/L    Eosinophils Absolute 0.00 (L) 0.05 - 0.50 E9/L    Basophils Absolute 0.00 0.00 - 0.20 E9/L    Metamyelocytes Relative 0.9 0.0 - 1.0 %    nRBC 0.0 /100 WBC    Anisocytosis 1+     Polychromasia 1+     Poikilocytes 1+     Ovalocytes 1+    CK    Collection Time: 04/13/22  7:01 AM   Result Value Ref Range    Total CK 30 20 - 200 U/L   C-Reactive Protein    Collection Time: 04/13/22  7:01 AM   Result Value Ref Range    CRP 0.3 0.0 - 0.4 mg/dL   Magnesium    Collection Time: 04/13/22  7:01 AM   Result Value Ref Range    Magnesium 1.9 1.6 - 2.6 mg/dL   Hepatic Function Panel    Collection Time: 04/13/22  7:01 AM   Result Value Ref Range    Total Protein 5.9 (L) 6.4 - 8.3 g/dL    Albumin 4.1 3.5 - 5.2 g/dL    Alkaline Phosphatase 74 40 - 129 U/L    ALT 10 0 - 40 U/L    AST 10 0 - 39 U/L    Total Bilirubin 0.4 0.0 - 1.2 mg/dL    Bilirubin, Direct <0.2 0.0 - 0.3 mg/dL    Bilirubin, Indirect see below 0.0 - 1.0 mg/dL   Phosphorus    Collection Time: 04/13/22  7:01 AM   Result Value Ref Range    Phosphorus 3.4 2.5 - 4.5 mg/dL   Sedimentation Rate    Collection Time: 04/13/22  7:01 AM   Result Value Ref Range    Sed Rate 2 0 - 15 mm/Hr   Brain Natriuretic Peptide Collection Time: 04/13/22  7:01 AM   Result Value Ref Range    Pro- (H) 0 - 125 pg/mL   Lactate, Sepsis    Collection Time: 04/13/22  7:11 AM   Result Value Ref Range    Lactic Acid, Sepsis 2.3 (H) 0.5 - 1.9 mmol/L   POCT Glucose    Collection Time: 04/13/22 11:41 AM   Result Value Ref Range    Meter Glucose 73 (L) 74 - 99 mg/dL   POCT Glucose    Collection Time: 04/13/22  4:48 PM   Result Value Ref Range    Meter Glucose 82 74 - 99 mg/dL   POCT Glucose    Collection Time: 04/13/22  8:15 PM   Result Value Ref Range    Meter Glucose 121 (H) 74 - 99 mg/dL   Basic Metabolic Panel    Collection Time: 04/14/22  3:30 AM   Result Value Ref Range    Sodium 136 132 - 146 mmol/L    Potassium 4.2 3.5 - 5.0 mmol/L    Chloride 100 98 - 107 mmol/L    CO2 26 22 - 29 mmol/L    Anion Gap 10 7 - 16 mmol/L    Glucose 118 (H) 74 - 99 mg/dL    BUN 23 6 - 23 mg/dL    CREATININE 0.7 0.7 - 1.2 mg/dL    GFR Non-African American >60 >=60 mL/min/1.73    GFR African American >60     Calcium 9.3 8.6 - 10.2 mg/dL   CBC with Auto Differential    Collection Time: 04/14/22  3:30 AM   Result Value Ref Range    WBC 7.9 4.5 - 11.5 E9/L    RBC 4.90 3.80 - 5.80 E12/L    Hemoglobin 15.0 12.5 - 16.5 g/dL    Hematocrit 45.7 37.0 - 54.0 %    MCV 93.3 80.0 - 99.9 fL    MCH 30.6 26.0 - 35.0 pg    MCHC 32.8 32.0 - 34.5 %    RDW 16.2 (H) 11.5 - 15.0 fL    Platelets 919 816 - 658 E9/L    MPV 9.6 7.0 - 12.0 fL    Neutrophils % 90.4 (H) 43.0 - 80.0 %    Lymphocytes % 7.0 (L) 20.0 - 42.0 %    Monocytes % 2.6 2.0 - 12.0 %    Eosinophils % 0.0 0.0 - 6.0 %    Basophils % 0.0 0.0 - 2.0 %    Neutrophils Absolute 7.11 1.80 - 7.30 E9/L    Lymphocytes Absolute 0.55 (L) 1.50 - 4.00 E9/L    Monocytes Absolute 0.24 0.10 - 0.95 E9/L    Eosinophils Absolute 0.00 (L) 0.05 - 0.50 E9/L    Basophils Absolute 0.00 0.00 - 0.20 E9/L    nRBC 0.0 /100 WBC    RBC Morphology Normal    CK    Collection Time: 04/14/22  3:30 AM   Result Value Ref Range    Total CK 26 20 - 200 U/L C-Reactive Protein    Collection Time: 04/14/22  3:30 AM   Result Value Ref Range    CRP 0.3 0.0 - 0.4 mg/dL   Magnesium    Collection Time: 04/14/22  3:30 AM   Result Value Ref Range    Magnesium 2.0 1.6 - 2.6 mg/dL   Lactate, Sepsis    Collection Time: 04/14/22  3:30 AM   Result Value Ref Range    Lactic Acid, Sepsis 2.2 (H) 0.5 - 1.9 mmol/L   Hepatic Function Panel    Collection Time: 04/14/22  3:30 AM   Result Value Ref Range    Total Protein 6.0 (L) 6.4 - 8.3 g/dL    Albumin 3.8 3.5 - 5.2 g/dL    Alkaline Phosphatase 83 40 - 129 U/L    ALT 11 0 - 40 U/L    AST 11 0 - 39 U/L    Total Bilirubin 0.3 0.0 - 1.2 mg/dL    Bilirubin, Direct <0.2 0.0 - 0.3 mg/dL    Bilirubin, Indirect see below 0.0 - 1.0 mg/dL   Phosphorus    Collection Time: 04/14/22  3:30 AM   Result Value Ref Range    Phosphorus 3.6 2.5 - 4.5 mg/dL   Sedimentation Rate    Collection Time: 04/14/22  3:30 AM   Result Value Ref Range    Sed Rate 1 0 - 15 mm/Hr   POCT Glucose    Collection Time: 04/14/22  7:56 AM   Result Value Ref Range    Meter Glucose 81 74 - 99 mg/dL   POCT Glucose    Collection Time: 04/14/22 10:46 AM   Result Value Ref Range    Meter Glucose 117 (H) 74 - 99 mg/dL         Discharge Exam:  See progress note from today    Disposition: Home    Patient Instructions:   Discharge Medication List as of 3/18/2022  4:15 PM      START taking these medications    Details   predniSONE (DELTASONE) 20 MG tablet Take 2 tablets by mouth daily for 5 days, Disp-10 tablet, R-0Normal      torsemide (DEMADEX) 10 MG tablet Take 1 tablet by mouth three times a week, Disp-30 tablet, R-3Normal      doxycycline hyclate (VIBRAMYCIN) 100 MG capsule Take 1 capsule by mouth every 12 hours for 7 days, Disp-14 capsule, R-0Normal         CONTINUE these medications which have NOT CHANGED    Details   benzonatate (TESSALON) 100 MG capsule Take 100 mg by mouth 3 times daily as needed for CoughHistorical Med      guaiFENesin-dextromethorphan (ROBITUSSIN DM) 100-10 MG/5ML syrup Take 5 mLs by mouth 4 times daily as needed for CoughHistorical Med      ferrous sulfate (IRON 325) 325 (65 Fe) MG tablet Take 1 tablet by mouth 2 times daily (with meals), Disp-30 tablet, R-3Normal      pantoprazole (PROTONIX) 40 MG tablet Take 1 tablet by mouth every morning (before breakfast), Disp-30 tablet, R-3Normal      Probiotic Product (PROBIOTIC-10 PO) Take by mouth daily Historical Med      Handicap Placard [facility] Starting Thu 8/19/2021, Disp-1 each, R-0, PrintPatient cannot walk 200 ft without stopping to rest.   Expiration 5 yrs. Fluticasone-Umeclidin-Vilant (TRELEGY ELLIPTA IN) Inhale 1 puff into the lungs dailyHistorical Med      Respiratory Therapy Supplies (FULL KIT NEBULIZER SET) MISC Disp-1 each, R-0, PrintUse as directed with nebulized medication. ipratropium-albuterol (DUONEB) 0.5-2.5 (3) MG/3ML SOLN nebulizer solution Inhale 3 mLs into the lungs every 4 hours (while awake), Disp-360 mLDC to SNF      oxyCODONE-acetaminophen (PERCOCET) 7.5-325 MG per tablet Take 1 tablet by mouth 2 times daily as needed for Pain.  Historical Med         STOP taking these medications       azithromycin (ZITHROMAX) 500 MG tablet Comments:   Reason for Stopping:         etodolac (LODINE) 500 MG tablet Comments:   Reason for Stopping:               Activity: As tolerated    Diet: Cardiac    Follow-up with Doris Antonio MD  [facility] [phone]    Schedule an appointment as soon as possible for a visit  follow up    Isidro Glass MD  [address]  [address] [facility] [phone]    Schedule an appointment as soon as possible for a visit  follow up    Kareem Fairchild [address] [facility] [address] [place] [address]  [phone]    Schedule an appointment as soon as possible for a visit  follow up        Note that over 30 minutes was spent in preparing discharge papers, discussing discharge with patient, medication review, etc.    Signed:  Jade Durand MD  4/23/2022  5:14 PM

## 2022-04-25 ENCOUNTER — CARE COORDINATION (OUTPATIENT)
Dept: CASE MANAGEMENT | Age: 72
End: 2022-04-25

## 2022-04-25 NOTE — CARE COORDINATION
[facility] Transitions Follow Up Call    2022    Patient: Tory Sepulveda  Patient : 1950   MRN: [id number]  Reason for Admission: COPD exacerbation  Discharge Date: 22 RARS: Readmission Risk Score: 20 ( )         Spoke with pt for a sub care transition call. Pt states that he is feeling \"pretty good\" today and voices no complaints. Pt states that he feels that he is at his baseline from a pulmonary standpoint. Pt remains on his chronic O2 between 4-5L cont at this time. Pt reports that SaO2 with his O2 on is typically ranging between 94-96%. Pt is being compliant with his nebulizer medication and states that he actually just finished up a treatment a little while ago. Pt did complete his HFU appt with his pcp on 22. No new changes to his tx plan or medications. Pt will f/u with ID on 5/3/22. Pt was reminded today and he is aware and intends on going. Pt voiced no needs/concerns today. Pt requested no further outreaches from CTN, as he feels that he is doing well enough and feels that he does not warrant any further follow up. No further outreaches will be made at this time. This CTN will sign off today per pt request.     Care Transitions Follow Up Call    Needs to be reviewed by the provider   Additional needs identified to be addressed with provider: No  none             Method of communication with provider : none      Care Transition Nurse (CTN) contacted the patient by telephone to follow up after admission on 4/10/22. Addressed changes since last contact: none    CTN reviewed medical action plan and red flags with patient and discussed any barriers to care and/or understanding of plan of care after discharge. Discussed appropriate site of care based on symptoms and resources available to patient including: PCP  Specialist  When to call 911. The patient agrees to contact the PCP office for questions related to their healthcare.      Patients top risk factors for readmission: medical condition-COPD, pulmonary mycobacterium avium, emphysema   multiple health system providers  polypharmacy  utilization of services  Interventions to address risk factors: Scheduled appointment with PCP-f/u in 3 months as advised and Scheduled appointment with Specialist-ID on 5/3/22    CTN provided contact information for future needs. No further follow-up call indicated based on severity of symptoms and risk factors. Pt requests no further outreaches from care transitions at this time. Care Transitions Subsequent and Final Call    Schedule Follow Up Appointment with PCP: Completed  Subsequent and Final Calls  Do you have any ongoing symptoms?: No  Have your medications changed?: No  Do you have any questions related to your medications?: No  Do you currently have any active services?: No  Are you currently active with any services?: Outpatient/Community Services  Do you have any needs or concerns that I can assist you with?: No  Identified Barriers: None  Care Transitions Interventions  Other Interventions:            Follow Up  Future Appointments   Date Time Provider Tan Miranda   5/3/2022  9:30 AM Gilda Cordero MD [facility] INF   6/10/2022 10:45 AM Paul Niño DO [facility]   7/6/2022  2:00 PM MIGUE Duran - NP [facility]   7/26/2022  1:00 PM MD WARD House [facility]   12/20/2022  1:30 PM MD W[facility] House  Roberto Carlos Dixon RN

## 2022-06-09 ENCOUNTER — TELEPHONE (OUTPATIENT)
Dept: ENT CLINIC | Age: 72
End: 2022-06-09

## 2022-06-09 NOTE — TELEPHONE ENCOUNTER
Patient called in and left a voicemail to cancel appt on 6/10/22. Will need a call to reschedule.      Electronically signed by Jaya Wilks on 6/9/2022 at 4:04 PM

## 2022-06-10 NOTE — TELEPHONE ENCOUNTER
MA spoke with patient, rescheduled 7/20 with  [address] [place] [address]    Electronically signed by Simeon Montero MA on 6/10/22 at 10:11 AM EDT

## 2022-07-27 ENCOUNTER — OFFICE VISIT (OUTPATIENT)
Dept: ENT CLINIC | Age: 72
End: 2022-07-27
Payer: MEDICARE

## 2022-07-27 VITALS
SYSTOLIC BLOOD PRESSURE: 105 MMHG | BODY MASS INDEX: 25.84 KG/M2 | TEMPERATURE: 97.5 F | DIASTOLIC BLOOD PRESSURE: 61 MMHG | HEIGHT: 73 IN | OXYGEN SATURATION: 88 % | HEART RATE: 72 BPM | WEIGHT: 195 LBS

## 2022-07-27 DIAGNOSIS — H61.23 BILATERAL IMPACTED CERUMEN: Primary | ICD-10-CM

## 2022-07-27 PROCEDURE — 3017F COLORECTAL CA SCREEN DOC REV: CPT | Performed by: OTOLARYNGOLOGY

## 2022-07-27 PROCEDURE — 1036F TOBACCO NON-USER: CPT | Performed by: OTOLARYNGOLOGY

## 2022-07-27 PROCEDURE — G8427 DOCREV CUR MEDS BY ELIG CLIN: HCPCS | Performed by: OTOLARYNGOLOGY

## 2022-07-27 PROCEDURE — 1123F ACP DISCUSS/DSCN MKR DOCD: CPT | Performed by: OTOLARYNGOLOGY

## 2022-07-27 PROCEDURE — G8417 CALC BMI ABV UP PARAM F/U: HCPCS | Performed by: OTOLARYNGOLOGY

## 2022-07-27 PROCEDURE — 69210 REMOVE IMPACTED EAR WAX UNI: CPT | Performed by: OTOLARYNGOLOGY

## 2022-07-27 ASSESSMENT — ENCOUNTER SYMPTOMS
SHORTNESS OF BREATH: 0
EYE PAIN: 0
APNEA: 0
EYE DISCHARGE: 0
ABDOMINAL PAIN: 0
RESPIRATORY NEGATIVE: 1
GASTROINTESTINAL NEGATIVE: 1
COLOR CHANGE: 0
CHEST TIGHTNESS: 0
VOMITING: 0
DIARRHEA: 0
EYES NEGATIVE: 1

## 2022-07-27 NOTE — PROGRESS NOTES
Subjective:      Patient ID:  Yvonne Torres is a 73 y.o. male. HPI:    Pt presents with a history of cerumen impaction removal.   The patients ear was last cleaned 6 month ago. The patient was not using ear drops to loosen wax immediately prior to this visit.       Hearing aids: no      Past Medical History:   Diagnosis Date    Acute and chronic respiratory failure with hypoxia ([address] 10/12/2017    Acute heart failure with preserved ejection fraction ([address] 4/8/2021    Acute respiratory disease due to severe acute respiratory syndrome coronavirus 2 (SARS-CoV-2) 01/01/2021    Acute respiratory failure with hypoxia ([address] 11/12/2018    Bullous emphysema ([address] 11/12/2018    Chronic back pain     Degeneration of umbar intervertebral disc    Colon cancer screening     COPD (chronic obstructive pulmonary disease) ([address]     Coronavirus infection 2/2/2022    Coronavirus 229E    Cough with hemoptysis 04/23/2019    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 ([address] 08/15/2019    First seen by ID; treatment started 9/4/2019    Emphysema lung ([address]     Glucose intolerance 06/10/2019    Hilar adenopathy 06/10/2019    Hypophosphatemia 06/10/2019    Immunocompromised ([address] 2/3/2022    Infection due to parainfluenza virus 3 06/10/2019    Iron deficiency 2/3/2022    Left upper lobe pneumonia 10/12/2017    Pleural effusion on right 10/03/2019    Pulmonary emphysema with fibrosis of lung ([address] 11/15/2018    Pulmonary Mycobacterium avium complex (MAC) infection ([address] 07/12/2019    BAL results finally completed this date    Rhinovirus infection 10/16/2020    Right lower lobe pneumonia 11/15/2018    Recurrent 4/23/19    S/P lumbar fusion 06/01/2019    Thoracic ascending aortic aneurysm ([address] 11/15/2018    Proximal ascending aorta; 3.8 cm; 11/15/18     Past Surgical History:   Procedure Laterality Date    ABSCESS DRAINAGE  2006    X2 RECTAL ABSCESS    BRONCHOSCOPY N/A 6/12/2019    BRONCHOSCOPY BRUSHINGS performed by Violette Martinez MD at [address] [facility] N/A 10/7/2019    BRONCHOSCOPY DIAGNOSTIC OR CELL [address] ONLY performed by Violette Martinez MD at [address] [facility] N/A 2021    BRONCHOSCOPY performed by Violette Martinez MD at [address] [place] [address]  2013    HC INSERT PICC CATH, 5/> YRS  4/10/2021         LUMBAR FUSION  2019    [facility]     Family History   Problem Relation Age of Onset    Other Mother          age 80    Other Father          age 80     Social History     Socioeconomic History    Marital status:      Spouse name: None    Number of children: None    Years of education: None    Highest education level: None   Tobacco Use    Smoking status: Former     Packs/day: 2.00     Years: 46.00     Pack years: 92.00     Types: Cigarettes     Start date: 10/12/1968     Quit date: 10/12/2014     Years since quittin.7    Smokeless tobacco: Never   Vaping Use    Vaping Use: Never used   Substance and Sexual Activity    Alcohol use: No    Drug use: No    Sexual activity: Not Currently     Partners: Female     Social Determinants of Health     Financial Resource Strain: Low Risk     Difficulty of Paying Living Expenses: Not hard at all   Food Insecurity: No Food Insecurity    Worried About Running Out of Food in the Last Year: Never true    Ran Out of Food in the Last Year: Never true     No Known Allergies    Review of Systems   Constitutional: Negative. Negative for appetite change. HENT:  Positive for congestion and hearing loss. Eyes: Negative. Negative for pain, discharge and visual disturbance. Respiratory: Negative. Negative for apnea, chest tightness and shortness of breath. Cardiovascular: Negative. Negative for chest pain, palpitations and leg swelling. Gastrointestinal: Negative. Negative for abdominal pain, diarrhea and vomiting. Endocrine: Negative for cold intolerance, heat intolerance and polydipsia.    Genitourinary: Negative. Negative for dysuria, flank pain and hematuria. Musculoskeletal: Negative. Negative for arthralgias, gait problem and neck pain. Skin: Negative. Negative for color change, pallor and rash. Allergic/Immunologic: Negative for environmental allergies, food allergies and immunocompromised state. Neurological: Negative. Negative for dizziness, numbness and headaches. Hematological:  Negative for adenopathy. Psychiatric/Behavioral: Negative. Negative for behavioral problems and hallucinations. All other systems reviewed and are negative. Objective:     Vitals:    07/27/22 1629   BP: 105/61   Pulse: 72   Temp: 97.5 °F (36.4 °C)   SpO2: (!) 88%     Physical Exam  Vitals and nursing note reviewed. Constitutional:       Appearance: He is well-developed. HENT:      Head: Normocephalic and atraumatic. Right Ear: There is impacted cerumen. Left Ear: There is impacted cerumen. Nose: Nose normal.      Mouth/Throat:      Pharynx: Uvula midline. Eyes:      Conjunctiva/sclera: Conjunctivae normal.      Pupils: Pupils are equal, round, and reactive to light. Cardiovascular:      Rate and Rhythm: Normal rate and regular rhythm. Heart sounds: Normal heart sounds. Pulmonary:      Effort: Pulmonary effort is normal.      Breath sounds: Normal breath sounds. Abdominal:      General: Bowel sounds are normal.      Palpations: Abdomen is soft. Musculoskeletal:      Cervical back: Normal range of motion and neck supple. Skin:     General: Skin is warm and dry. Neurological:      Mental Status: He is alert and oriented to person, place, and time. Cerumen removal     Auditory canal(s) both ears completely obstructed with cerumen. A microscope was used due to deep impaction of the cerumen. Cerumen was gently removed using soft plastic curette, suction. Tympanic membranes are intact following the procedure.  Auditory canals appear normal. Assessment:       Diagnosis Orders   1. Bilateral impacted cerumen                   Plan:      Cerumen impaction   Discussed H2O2 and irrigation bi-weekly for maintenance.     Follow up in 6 month(s)

## 2022-08-01 ENCOUNTER — HOSPITAL ENCOUNTER (OUTPATIENT)
Age: 72
Discharge: HOME | End: 2022-08-01
Payer: MEDICARE

## 2022-08-01 DIAGNOSIS — A31.0 PULMONARY MYCOBACTERIUM AVIUM COMPLEX (MAC) INFECTION (HCC): ICD-10-CM

## 2022-08-01 LAB
ALBUMIN SERPL-MCNC: 4.8 G/DL (ref 3.5–5.2)
ALP BLD-CCNC: 65 U/L (ref 40–129)
ALT SERPL-CCNC: 13 U/L (ref 0–40)
ANION GAP SERPL CALCULATED.3IONS-SCNC: 12 MMOL/L (ref 7–16)
AST SERPL-CCNC: 22 U/L (ref 0–39)
BASOPHILS ABSOLUTE: 0.04 E9/L (ref 0–0.2)
BASOPHILS RELATIVE PERCENT: 0.6 % (ref 0–2)
BILIRUB SERPL-MCNC: 0.8 MG/DL (ref 0–1.2)
BUN BLDV-MCNC: 12 MG/DL (ref 6–23)
C-REACTIVE PROTEIN: 0.3 MG/DL (ref 0–0.4)
CALCIUM SERPL-MCNC: 10.5 MG/DL (ref 8.6–10.2)
CHLORIDE BLD-SCNC: 104 MMOL/L (ref 98–107)
CO2: 22 MMOL/L (ref 22–29)
CREAT SERPL-MCNC: 0.9 MG/DL (ref 0.7–1.2)
EOSINOPHILS ABSOLUTE: 0.05 E9/L (ref 0.05–0.5)
EOSINOPHILS RELATIVE PERCENT: 0.8 % (ref 0–6)
GFR AFRICAN AMERICAN: >60
GFR NON-AFRICAN AMERICAN: >60 ML/MIN/1.73
GLUCOSE BLD-MCNC: 91 MG/DL (ref 74–99)
HCT VFR BLD CALC: 50.6 % (ref 37–54)
HEMOGLOBIN: 17.1 G/DL (ref 12.5–16.5)
IMMATURE GRANULOCYTES #: 0.02 E9/L
IMMATURE GRANULOCYTES %: 0.3 % (ref 0–5)
LYMPHOCYTES ABSOLUTE: 1.64 E9/L (ref 1.5–4)
LYMPHOCYTES RELATIVE PERCENT: 25.9 % (ref 20–42)
MCH RBC QN AUTO: 31.7 PG (ref 26–35)
MCHC RBC AUTO-ENTMCNC: 33.8 % (ref 32–34.5)
MCV RBC AUTO: 93.9 FL (ref 80–99.9)
MONOCYTES ABSOLUTE: 0.45 E9/L (ref 0.1–0.95)
MONOCYTES RELATIVE PERCENT: 7.1 % (ref 2–12)
NEUTROPHILS ABSOLUTE: 4.13 E9/L (ref 1.8–7.3)
NEUTROPHILS RELATIVE PERCENT: 65.3 % (ref 43–80)
PDW BLD-RTO: 13.5 FL (ref 11.5–15)
PLATELET # BLD: 216 E9/L (ref 130–450)
PMV BLD AUTO: 10.1 FL (ref 7–12)
POTASSIUM SERPL-SCNC: 4.1 MMOL/L (ref 3.5–5)
RBC # BLD: 5.39 E12/L (ref 3.8–5.8)
SEDIMENTATION RATE, ERYTHROCYTE: 0 MM/HR (ref 0–15)
SODIUM BLD-SCNC: 138 MMOL/L (ref 132–146)
TOTAL PROTEIN: 7 G/DL (ref 6.4–8.3)
WBC # BLD: 6.3 E9/L (ref 4.5–11.5)

## 2022-08-01 PROCEDURE — 86140 C-REACTIVE PROTEIN: CPT

## 2022-08-01 PROCEDURE — 85025 COMPLETE CBC W/AUTO DIFF WBC: CPT

## 2022-08-01 PROCEDURE — 36415 COLL VENOUS BLD VENIPUNCTURE: CPT

## 2022-08-01 PROCEDURE — 80053 COMPREHEN METABOLIC PANEL: CPT

## 2022-08-01 PROCEDURE — 85651 RBC SED RATE NONAUTOMATED: CPT

## 2022-10-27 ENCOUNTER — OFFICE VISIT (OUTPATIENT)
Dept: PRIMARY CARE CLINIC | Age: 72
End: 2022-10-27
Payer: MEDICARE

## 2022-10-27 VITALS
HEART RATE: 74 BPM | WEIGHT: 192 LBS | BODY MASS INDEX: 25.45 KG/M2 | DIASTOLIC BLOOD PRESSURE: 70 MMHG | TEMPERATURE: 97.7 F | HEIGHT: 73 IN | RESPIRATION RATE: 16 BRPM | SYSTOLIC BLOOD PRESSURE: 110 MMHG | OXYGEN SATURATION: 85 %

## 2022-10-27 DIAGNOSIS — J30.9 ALLERGIC RHINITIS, UNSPECIFIED SEASONALITY, UNSPECIFIED TRIGGER: Primary | ICD-10-CM

## 2022-10-27 PROCEDURE — 1123F ACP DISCUSS/DSCN MKR DOCD: CPT | Performed by: INTERNAL MEDICINE

## 2022-10-27 PROCEDURE — G8484 FLU IMMUNIZE NO ADMIN: HCPCS | Performed by: INTERNAL MEDICINE

## 2022-10-27 PROCEDURE — 99213 OFFICE O/P EST LOW 20 MIN: CPT | Performed by: INTERNAL MEDICINE

## 2022-10-27 PROCEDURE — 3017F COLORECTAL CA SCREEN DOC REV: CPT | Performed by: INTERNAL MEDICINE

## 2022-10-27 PROCEDURE — 1036F TOBACCO NON-USER: CPT | Performed by: INTERNAL MEDICINE

## 2022-10-27 PROCEDURE — G8427 DOCREV CUR MEDS BY ELIG CLIN: HCPCS | Performed by: INTERNAL MEDICINE

## 2022-10-27 PROCEDURE — G8417 CALC BMI ABV UP PARAM F/U: HCPCS | Performed by: INTERNAL MEDICINE

## 2022-10-27 SDOH — ECONOMIC STABILITY: FOOD INSECURITY: WITHIN THE PAST 12 MONTHS, YOU WORRIED THAT YOUR FOOD WOULD RUN OUT BEFORE YOU GOT MONEY TO BUY MORE.: NEVER TRUE

## 2022-10-27 SDOH — ECONOMIC STABILITY: FOOD INSECURITY: WITHIN THE PAST 12 MONTHS, THE FOOD YOU BOUGHT JUST DIDN'T LAST AND YOU DIDN'T HAVE MONEY TO GET MORE.: NEVER TRUE

## 2022-10-27 ASSESSMENT — PATIENT HEALTH QUESTIONNAIRE - PHQ9
SUM OF ALL RESPONSES TO PHQ QUESTIONS 1-9: 0
SUM OF ALL RESPONSES TO PHQ QUESTIONS 1-9: 0
SUM OF ALL RESPONSES TO PHQ9 QUESTIONS 1 & 2: 0
SUM OF ALL RESPONSES TO PHQ QUESTIONS 1-9: 0
1. LITTLE INTEREST OR PLEASURE IN DOING THINGS: 0
2. FEELING DOWN, DEPRESSED OR HOPELESS: 0
SUM OF ALL RESPONSES TO PHQ QUESTIONS 1-9: 0

## 2022-10-27 ASSESSMENT — SOCIAL DETERMINANTS OF HEALTH (SDOH): HOW HARD IS IT FOR YOU TO PAY FOR THE VERY BASICS LIKE FOOD, HOUSING, MEDICAL CARE, AND HEATING?: NOT HARD AT ALL

## 2022-10-27 NOTE — PROGRESS NOTES
Sp Mccann  10/27/22     Chief Complaint   Patient presents with    Sinusitis        No Known Allergies     Current Outpatient Medications   Medication Sig Dispense Refill    ipratropium-albuterol (DUONEB) 0.5-2.5 (3) MG/3ML SOLN nebulizer solution Inhale 3 mLs into the lungs every 4 hours (while awake) 120 each 3    azithromycin (ZITHROMAX) 250 MG tablet Take 500 mg by mouth every other day Indications: Mild Upper Respiratory Infection      budesonide (PULMICORT) 0.5 MG/2ML nebulizer suspension Take 2 mLs by nebulization 2 times daily 60 each 3    formoterol (PERFOROMIST) 20 MCG/2ML nebulizer solution Take 2 mLs by nebulization 2 times daily 120 mL 5    ferrous sulfate (IRON 325) 325 (65 Fe) MG tablet Take 1 tablet by mouth 2 times daily (with meals) 30 tablet 3    pantoprazole (PROTONIX) 40 MG tablet Take 1 tablet by mouth every morning (before breakfast) 30 tablet 3    Probiotic Product (PROBIOTIC-10 PO) Take by mouth daily      Handicap Placard MISC by Does not apply route Patient cannot walk 200 ft without stopping to rest.    Expiration 5 yrs. 1 each 0    Respiratory Therapy Supplies (FULL KIT NEBULIZER SET) MISC Use as directed with nebulized medication. 1 each 0    oxyCODONE-acetaminophen (PERCOCET) 7.5-325 MG per tablet Take 1 tablet by mouth 2 times daily as needed for Pain. No current facility-administered medications for this visit. HPI: Patient comes in complaining of some nasal congestion. He has a history of seasonal allergies and often has problems around the change of season. He denies any fever or chills. He denies any discolored nasal drainage or sputum. He remains on all of his usual meds and supplements the same as listed on his med list.    Review of Systems: as per HPI      Physical Exam:    Patient is a 81 y.o. male. Patient appears to be in no distress. Breathing comfortably. Ambulates without assistance. HEENT: Mild nasal congestion small clear postnasal drainage. Neck supple, no adenopathy or bruits. Heart RR, no MGR. Lungs clear. Abd: normal  Ext: no edema. Peripheral pulses: normal.  No neurologic deficits noted. Assessment:    Rome Scott was seen today for sinusitis. Diagnoses and all orders for this visit:    Allergic rhinitis, unspecified seasonality, unspecified trigger        Discussion Notes: Patient advised to try Claritin 10 mg daily and Flonase nasal spray. He will call if he develops any discolored nasal drainage or sputum or any fever or chills.

## 2022-11-17 ENCOUNTER — OFFICE VISIT (OUTPATIENT)
Dept: PRIMARY CARE CLINIC | Age: 72
End: 2022-11-17
Payer: MEDICARE

## 2022-11-17 VITALS
OXYGEN SATURATION: 81 % | SYSTOLIC BLOOD PRESSURE: 130 MMHG | WEIGHT: 195 LBS | HEIGHT: 73 IN | HEART RATE: 100 BPM | TEMPERATURE: 98.4 F | RESPIRATION RATE: 18 BRPM | BODY MASS INDEX: 25.84 KG/M2 | DIASTOLIC BLOOD PRESSURE: 70 MMHG

## 2022-11-17 DIAGNOSIS — A31.0 PULMONARY MYCOBACTERIUM AVIUM COMPLEX (MAC) INFECTION (HCC): ICD-10-CM

## 2022-11-17 DIAGNOSIS — R68.89 FLU-LIKE SYMPTOMS: ICD-10-CM

## 2022-11-17 DIAGNOSIS — J96.11 CHRONIC RESPIRATORY FAILURE WITH HYPOXIA (HCC): ICD-10-CM

## 2022-11-17 DIAGNOSIS — J06.9 UPPER RESPIRATORY TRACT INFECTION, UNSPECIFIED TYPE: Primary | ICD-10-CM

## 2022-11-17 DIAGNOSIS — J44.9 CHRONIC OBSTRUCTIVE PULMONARY DISEASE, UNSPECIFIED COPD TYPE (HCC): ICD-10-CM

## 2022-11-17 LAB
INFLUENZA A ANTIBODY: NORMAL
INFLUENZA B ANTIBODY: NORMAL
Lab: NORMAL
PERFORMING INSTRUMENT: NORMAL
QC PASS/FAIL: NORMAL
SARS-COV-2, POC: NORMAL

## 2022-11-17 PROCEDURE — G8417 CALC BMI ABV UP PARAM F/U: HCPCS | Performed by: INTERNAL MEDICINE

## 2022-11-17 PROCEDURE — 99214 OFFICE O/P EST MOD 30 MIN: CPT | Performed by: INTERNAL MEDICINE

## 2022-11-17 PROCEDURE — 3017F COLORECTAL CA SCREEN DOC REV: CPT | Performed by: INTERNAL MEDICINE

## 2022-11-17 PROCEDURE — 1123F ACP DISCUSS/DSCN MKR DOCD: CPT | Performed by: INTERNAL MEDICINE

## 2022-11-17 PROCEDURE — 87804 INFLUENZA ASSAY W/OPTIC: CPT | Performed by: INTERNAL MEDICINE

## 2022-11-17 PROCEDURE — 3023F SPIROM DOC REV: CPT | Performed by: INTERNAL MEDICINE

## 2022-11-17 PROCEDURE — 1036F TOBACCO NON-USER: CPT | Performed by: INTERNAL MEDICINE

## 2022-11-17 PROCEDURE — G8427 DOCREV CUR MEDS BY ELIG CLIN: HCPCS | Performed by: INTERNAL MEDICINE

## 2022-11-17 PROCEDURE — G8484 FLU IMMUNIZE NO ADMIN: HCPCS | Performed by: INTERNAL MEDICINE

## 2022-11-17 PROCEDURE — 87426 SARSCOV CORONAVIRUS AG IA: CPT | Performed by: INTERNAL MEDICINE

## 2022-11-17 RX ORDER — AZITHROMYCIN 250 MG/1
250 TABLET, FILM COATED ORAL SEE ADMIN INSTRUCTIONS
Qty: 6 TABLET | Refills: 0 | Status: ON HOLD
Start: 2022-11-17 | End: 2022-11-22 | Stop reason: HOSPADM

## 2022-11-17 NOTE — PROGRESS NOTES
Vaughn Sheriff  11/17/22     Chief Complaint   Patient presents with    Pharyngitis     Cough body aches        No Known Allergies     Current Outpatient Medications   Medication Sig Dispense Refill    ipratropium-albuterol (DUONEB) 0.5-2.5 (3) MG/3ML SOLN nebulizer solution Inhale 3 mLs into the lungs every 4 hours (while awake) 120 each 3    formoterol (PERFOROMIST) 20 MCG/2ML nebulizer solution Take 2 mLs by nebulization 2 times daily 120 mL 5    ferrous sulfate (IRON 325) 325 (65 Fe) MG tablet Take 1 tablet by mouth 2 times daily (with meals) 30 tablet 3    pantoprazole (PROTONIX) 40 MG tablet Take 1 tablet by mouth every morning (before breakfast) 30 tablet 3    Probiotic Product (PROBIOTIC-10 PO) Take by mouth daily      Handicap Placard MISC by Does not apply route Patient cannot walk 200 ft without stopping to rest.    Expiration 5 yrs. 1 each 0    Respiratory Therapy Supplies (FULL KIT NEBULIZER SET) MISC Use as directed with nebulized medication. 1 each 0    oxyCODONE-acetaminophen (PERCOCET) 7.5-325 MG per tablet Take 1 tablet by mouth 2 times daily as needed for Pain. azithromycin (ZITHROMAX) 250 MG tablet Take 500 mg by mouth every other day Indications: Mild Upper Respiratory Infection (Patient not taking: Reported on 11/17/2022)      budesonide (PULMICORT) 0.5 MG/2ML nebulizer suspension Take 2 mLs by nebulization 2 times daily (Patient not taking: Reported on 11/17/2022) 60 each 3     No current facility-administered medications for this visit. HPI: Patient comes in today complaining of a sore throat and nasal congestion. He has some mild body aches. No documented fever. He remains chronically hypoxic and is on continuous supplemental oxygen, currently at 4 L/min. He states he feels no more short of breath than usual.  He is not expectorating any significant amount of sputum.     Review of Systems: as per HPI      Physical Exam:    Vitals:    11/17/22 1027   BP: 130/70   Pulse: 100 Resp: 18   Temp: 98.4 °F (36.9 °C)   SpO2: (!) 81%       Patient is a 81 y.o. male. Patient appears to be moderately dyspneic with supplemental oxygen. Breathing comfortably. Ambulates without assistance. HEENT: normal.  Neck supple, no adenopathy or bruits. Heart RR, no MGR. Lungs breath sounds distant. No wheezes or rhonchi noted. Abd: normal  Ext: no edema. Peripheral pulses: normal.  No neurologic deficits noted. Assessment:    Rossana Carvalho was seen today for pharyngitis. Diagnoses and all orders for this visit:    Upper respiratory tract infection, unspecified type    Flu-like symptoms  -     POCT COVID-19, Antigen  -     POCT Influenza A/B    Chronic obstructive pulmonary disease, unspecified COPD type ([facility]    Chronic respiratory failure with hypoxia (HCC)    Pulmonary Mycobacterium avium complex (MAC) infection ([facility]        Discussion Notes: We will start him on a Z-Benja as directed. Continue all of his other meds and supplements the same as listed on his med list.  He will call in a few days if his symptoms or not improved and will go to the emergency room if his symptoms get worse.

## 2022-11-18 ENCOUNTER — APPOINTMENT (OUTPATIENT)
Dept: GENERAL RADIOLOGY | Age: 72
DRG: 190 | End: 2022-11-18
Payer: MEDICARE

## 2022-11-18 ENCOUNTER — HOSPITAL ENCOUNTER (INPATIENT)
Age: 72
LOS: 4 days | Discharge: HOME | DRG: 190 | End: 2022-11-22
Attending: EMERGENCY MEDICINE | Admitting: INTERNAL MEDICINE
Payer: MEDICARE

## 2022-11-18 DIAGNOSIS — J44.1 COPD EXACERBATION (HCC): ICD-10-CM

## 2022-11-18 DIAGNOSIS — J96.01 ACUTE RESPIRATORY FAILURE WITH HYPOXIA (HCC): Primary | ICD-10-CM

## 2022-11-18 PROBLEM — J21.0 RSV (ACUTE BRONCHIOLITIS DUE TO RESPIRATORY SYNCYTIAL VIRUS): Status: ACTIVE | Noted: 2022-11-18

## 2022-11-18 LAB
ADENOVIRUS BY PCR: NOT DETECTED
ANION GAP SERPL CALCULATED.3IONS-SCNC: 9 MMOL/L (ref 7–16)
ANTISTREPTOLYSIN-O: 25 IU/ML (ref 0–200)
BASOPHILS ABSOLUTE: 0.02 E9/L (ref 0–0.2)
BASOPHILS RELATIVE PERCENT: 0.2 % (ref 0–2)
BILIRUBIN URINE: NEGATIVE
BLOOD, URINE: NEGATIVE
BORDETELLA PARAPERTUSSIS BY PCR: NOT DETECTED
BORDETELLA PERTUSSIS BY PCR: NOT DETECTED
BUN BLDV-MCNC: 6 MG/DL (ref 6–23)
C-REACTIVE PROTEIN: 0.9 MG/DL (ref 0–0.4)
CALCIUM SERPL-MCNC: 9.7 MG/DL (ref 8.6–10.2)
CEA: 3.3 NG/ML (ref 0–5.2)
CHLAMYDOPHILIA PNEUMONIAE BY PCR: NOT DETECTED
CHLORIDE BLD-SCNC: 103 MMOL/L (ref 98–107)
CLARITY: CLEAR
CO2: 24 MMOL/L (ref 22–29)
COLOR: YELLOW
CORONAVIRUS 229E BY PCR: NOT DETECTED
CORONAVIRUS HKU1 BY PCR: NOT DETECTED
CORONAVIRUS NL63 BY PCR: NOT DETECTED
CORONAVIRUS OC43 BY PCR: NOT DETECTED
CREAT SERPL-MCNC: 0.8 MG/DL (ref 0.7–1.2)
D DIMER: 328 NG/ML DDU
EKG ATRIAL RATE: 111 BPM
EKG P AXIS: 60 DEGREES
EKG P-R INTERVAL: 176 MS
EKG Q-T INTERVAL: 332 MS
EKG QRS DURATION: 98 MS
EKG QTC CALCULATION (BAZETT): 451 MS
EKG R AXIS: -69 DEGREES
EKG T AXIS: 73 DEGREES
EKG VENTRICULAR RATE: 111 BPM
EOSINOPHILS ABSOLUTE: 0.01 E9/L (ref 0.05–0.5)
EOSINOPHILS RELATIVE PERCENT: 0.1 % (ref 0–6)
GFR SERPL CREATININE-BSD FRML MDRD: >60 ML/MIN/1.73
GLUCOSE BLD-MCNC: 82 MG/DL (ref 74–99)
GLUCOSE URINE: NEGATIVE MG/DL
HCT VFR BLD CALC: 50.6 % (ref 37–54)
HEMOGLOBIN: 17 G/DL (ref 12.5–16.5)
HUMAN METAPNEUMOVIRUS BY PCR: NOT DETECTED
HUMAN RHINOVIRUS/ENTEROVIRUS BY PCR: NOT DETECTED
IMMATURE GRANULOCYTES #: 0.03 E9/L
IMMATURE GRANULOCYTES %: 0.3 % (ref 0–5)
INFLUENZA A BY PCR: NOT DETECTED
INFLUENZA B BY PCR: NOT DETECTED
KETONES, URINE: ABNORMAL MG/DL
LEUKOCYTE ESTERASE, URINE: NEGATIVE
LYMPHOCYTES ABSOLUTE: 0.63 E9/L (ref 1.5–4)
LYMPHOCYTES RELATIVE PERCENT: 6.7 % (ref 20–42)
MCH RBC QN AUTO: 31.1 PG (ref 26–35)
MCHC RBC AUTO-ENTMCNC: 33.6 % (ref 32–34.5)
MCV RBC AUTO: 92.7 FL (ref 80–99.9)
MONOCYTES ABSOLUTE: 0.67 E9/L (ref 0.1–0.95)
MONOCYTES RELATIVE PERCENT: 7.2 % (ref 2–12)
MYCOPLASMA PNEUMONIAE BY PCR: NOT DETECTED
NEUTROPHILS ABSOLUTE: 7.99 E9/L (ref 1.8–7.3)
NEUTROPHILS RELATIVE PERCENT: 85.5 % (ref 43–80)
NITRITE, URINE: NEGATIVE
PARAINFLUENZA VIRUS 1 BY PCR: NOT DETECTED
PARAINFLUENZA VIRUS 2 BY PCR: NOT DETECTED
PARAINFLUENZA VIRUS 3 BY PCR: NOT DETECTED
PARAINFLUENZA VIRUS 4 BY PCR: NOT DETECTED
PDW BLD-RTO: 13.9 FL (ref 11.5–15)
PH UA: 5.5 (ref 5–9)
PLATELET # BLD: 152 E9/L (ref 130–450)
PMV BLD AUTO: 10 FL (ref 7–12)
POTASSIUM REFLEX MAGNESIUM: 4.4 MMOL/L (ref 3.5–5)
PRO-BNP: 206 PG/ML (ref 0–125)
PROCALCITONIN: 0.18 NG/ML (ref 0–0.08)
PROTEIN UA: NEGATIVE MG/DL
RBC # BLD: 5.46 E12/L (ref 3.8–5.8)
RESPIRATORY SYNCYTIAL VIRUS BY PCR: DETECTED
SARS-COV-2, PCR: NOT DETECTED
SEDIMENTATION RATE, ERYTHROCYTE: 0 MM/HR (ref 0–15)
SODIUM BLD-SCNC: 136 MMOL/L (ref 132–146)
SPECIFIC GRAVITY UA: >=1.03 (ref 1–1.03)
TROPONIN, HIGH SENSITIVITY: 7 NG/L (ref 0–11)
UROBILINOGEN, URINE: 0.2 E.U./DL
WBC # BLD: 9.4 E9/L (ref 4.5–11.5)

## 2022-11-18 PROCEDURE — 82378 CARCINOEMBRYONIC ANTIGEN: CPT

## 2022-11-18 PROCEDURE — 94640 AIRWAY INHALATION TREATMENT: CPT

## 2022-11-18 PROCEDURE — 93010 ELECTROCARDIOGRAM REPORT: CPT | Performed by: INTERNAL MEDICINE

## 2022-11-18 PROCEDURE — 85651 RBC SED RATE NONAUTOMATED: CPT

## 2022-11-18 PROCEDURE — 86060 ANTISTREPTOLYSIN O TITER: CPT

## 2022-11-18 PROCEDURE — 6360000002 HC RX W HCPCS: Performed by: INTERNAL MEDICINE

## 2022-11-18 PROCEDURE — 0202U NFCT DS 22 TRGT SARS-COV-2: CPT

## 2022-11-18 PROCEDURE — 84484 ASSAY OF TROPONIN QUANT: CPT

## 2022-11-18 PROCEDURE — 6370000000 HC RX 637 (ALT 250 FOR IP)

## 2022-11-18 PROCEDURE — 81003 URINALYSIS AUTO W/O SCOPE: CPT

## 2022-11-18 PROCEDURE — 87088 URINE BACTERIA CULTURE: CPT

## 2022-11-18 PROCEDURE — 6370000000 HC RX 637 (ALT 250 FOR IP): Performed by: INTERNAL MEDICINE

## 2022-11-18 PROCEDURE — 2060000000 HC ICU INTERMEDIATE R&B

## 2022-11-18 PROCEDURE — 80048 BASIC METABOLIC PNL TOTAL CA: CPT

## 2022-11-18 PROCEDURE — 85025 COMPLETE CBC W/AUTO DIFF WBC: CPT

## 2022-11-18 PROCEDURE — 96374 THER/PROPH/DIAG INJ IV PUSH: CPT

## 2022-11-18 PROCEDURE — 2700000000 HC OXYGEN THERAPY PER DAY

## 2022-11-18 PROCEDURE — 6360000002 HC RX W HCPCS

## 2022-11-18 PROCEDURE — 84145 PROCALCITONIN (PCT): CPT

## 2022-11-18 PROCEDURE — 94664 DEMO&/EVAL PT USE INHALER: CPT

## 2022-11-18 PROCEDURE — 71045 X-RAY EXAM CHEST 1 VIEW: CPT

## 2022-11-18 PROCEDURE — 87040 BLOOD CULTURE FOR BACTERIA: CPT

## 2022-11-18 PROCEDURE — 99285 EMERGENCY DEPT VISIT HI MDM: CPT

## 2022-11-18 PROCEDURE — 83880 ASSAY OF NATRIURETIC PEPTIDE: CPT

## 2022-11-18 PROCEDURE — 36415 COLL VENOUS BLD VENIPUNCTURE: CPT

## 2022-11-18 PROCEDURE — 87449 NOS EACH ORGANISM AG IA: CPT

## 2022-11-18 PROCEDURE — 2580000003 HC RX 258: Performed by: INTERNAL MEDICINE

## 2022-11-18 PROCEDURE — 86140 C-REACTIVE PROTEIN: CPT

## 2022-11-18 PROCEDURE — 85378 FIBRIN DEGRADE SEMIQUANT: CPT

## 2022-11-18 PROCEDURE — 93005 ELECTROCARDIOGRAM TRACING: CPT

## 2022-11-18 RX ORDER — LACTOBACILLUS RHAMNOSUS GG 10B CELL
1 CAPSULE ORAL DAILY
Status: DISCONTINUED | OUTPATIENT
Start: 2022-11-18 | End: 2022-11-22 | Stop reason: HOSPADM

## 2022-11-18 RX ORDER — PANTOPRAZOLE SODIUM 40 MG/1
40 TABLET, DELAYED RELEASE ORAL
Status: DISCONTINUED | OUTPATIENT
Start: 2022-11-19 | End: 2022-11-22 | Stop reason: HOSPADM

## 2022-11-18 RX ORDER — AZITHROMYCIN 250 MG/1
500 TABLET, FILM COATED ORAL EVERY OTHER DAY
Status: DISCONTINUED | OUTPATIENT
Start: 2022-11-20 | End: 2022-11-22 | Stop reason: HOSPADM

## 2022-11-18 RX ORDER — IPRATROPIUM BROMIDE AND ALBUTEROL SULFATE 2.5; .5 MG/3ML; MG/3ML
1 SOLUTION RESPIRATORY (INHALATION)
Status: DISCONTINUED | OUTPATIENT
Start: 2022-11-18 | End: 2022-11-22 | Stop reason: HOSPADM

## 2022-11-18 RX ORDER — FERROUS SULFATE 325(65) MG
325 TABLET ORAL 2 TIMES DAILY WITH MEALS
Status: DISCONTINUED | OUTPATIENT
Start: 2022-11-18 | End: 2022-11-22 | Stop reason: HOSPADM

## 2022-11-18 RX ORDER — ACETAMINOPHEN 325 MG/1
650 TABLET ORAL EVERY 4 HOURS PRN
Status: DISCONTINUED | OUTPATIENT
Start: 2022-11-18 | End: 2022-11-22 | Stop reason: HOSPADM

## 2022-11-18 RX ORDER — BENZONATATE 100 MG/1
200 CAPSULE ORAL 3 TIMES DAILY
Status: DISCONTINUED | OUTPATIENT
Start: 2022-11-18 | End: 2022-11-22 | Stop reason: HOSPADM

## 2022-11-18 RX ORDER — METHYLPREDNISOLONE SODIUM SUCCINATE 125 MG/2ML
125 INJECTION, POWDER, LYOPHILIZED, FOR SOLUTION INTRAMUSCULAR; INTRAVENOUS ONCE
Status: COMPLETED | OUTPATIENT
Start: 2022-11-18 | End: 2022-11-18

## 2022-11-18 RX ORDER — POTASSIUM CHLORIDE 20 MEQ/1
40 TABLET, EXTENDED RELEASE ORAL PRN
Status: DISCONTINUED | OUTPATIENT
Start: 2022-11-18 | End: 2022-11-22 | Stop reason: HOSPADM

## 2022-11-18 RX ORDER — BUDESONIDE 0.25 MG/2ML
250 INHALANT ORAL 2 TIMES DAILY
Status: DISCONTINUED | OUTPATIENT
Start: 2022-11-18 | End: 2022-11-19

## 2022-11-18 RX ORDER — POTASSIUM CHLORIDE 7.45 MG/ML
10 INJECTION INTRAVENOUS PRN
Status: DISCONTINUED | OUTPATIENT
Start: 2022-11-18 | End: 2022-11-22 | Stop reason: HOSPADM

## 2022-11-18 RX ORDER — OXYCODONE HYDROCHLORIDE AND ACETAMINOPHEN 5; 325 MG/1; MG/1
1 TABLET ORAL 2 TIMES DAILY PRN
Status: DISCONTINUED | OUTPATIENT
Start: 2022-11-18 | End: 2022-11-22 | Stop reason: HOSPADM

## 2022-11-18 RX ORDER — OXYCODONE HYDROCHLORIDE 5 MG/1
2.5 TABLET ORAL 2 TIMES DAILY PRN
Status: DISCONTINUED | OUTPATIENT
Start: 2022-11-18 | End: 2022-11-22 | Stop reason: HOSPADM

## 2022-11-18 RX ORDER — ENOXAPARIN SODIUM 100 MG/ML
40 INJECTION SUBCUTANEOUS DAILY
Status: DISCONTINUED | OUTPATIENT
Start: 2022-11-18 | End: 2022-11-22 | Stop reason: HOSPADM

## 2022-11-18 RX ORDER — IPRATROPIUM BROMIDE AND ALBUTEROL SULFATE 2.5; .5 MG/3ML; MG/3ML
1 SOLUTION RESPIRATORY (INHALATION) EVERY 4 HOURS PRN
COMMUNITY

## 2022-11-18 RX ORDER — METHYLPREDNISOLONE SODIUM SUCCINATE 125 MG/2ML
60 INJECTION, POWDER, LYOPHILIZED, FOR SOLUTION INTRAMUSCULAR; INTRAVENOUS EVERY 8 HOURS
Status: DISCONTINUED | OUTPATIENT
Start: 2022-11-18 | End: 2022-11-20

## 2022-11-18 RX ORDER — CODEINE PHOSPHATE AND GUAIFENESIN 10; 100 MG/5ML; MG/5ML
5 SOLUTION ORAL EVERY 4 HOURS PRN
Status: DISCONTINUED | OUTPATIENT
Start: 2022-11-18 | End: 2022-11-22 | Stop reason: HOSPADM

## 2022-11-18 RX ORDER — PANTOPRAZOLE SODIUM 40 MG/1
40 TABLET, DELAYED RELEASE ORAL DAILY PRN
COMMUNITY

## 2022-11-18 RX ORDER — IPRATROPIUM BROMIDE AND ALBUTEROL SULFATE 2.5; .5 MG/3ML; MG/3ML
1 SOLUTION RESPIRATORY (INHALATION)
Status: COMPLETED | OUTPATIENT
Start: 2022-11-18 | End: 2022-11-18

## 2022-11-18 RX ORDER — ARFORMOTEROL TARTRATE 15 UG/2ML
15 SOLUTION RESPIRATORY (INHALATION) 2 TIMES DAILY
Status: DISCONTINUED | OUTPATIENT
Start: 2022-11-18 | End: 2022-11-22 | Stop reason: HOSPADM

## 2022-11-18 RX ORDER — BUDESONIDE 0.5 MG/2ML
1 INHALANT ORAL 2 TIMES DAILY
COMMUNITY

## 2022-11-18 RX ORDER — SODIUM CHLORIDE 9 MG/ML
INJECTION, SOLUTION INTRAVENOUS CONTINUOUS
Status: DISCONTINUED | OUTPATIENT
Start: 2022-11-18 | End: 2022-11-22 | Stop reason: HOSPADM

## 2022-11-18 RX ORDER — OXYCODONE AND ACETAMINOPHEN 7.5; 325 MG/1; MG/1
1 TABLET ORAL 2 TIMES DAILY PRN
Status: DISCONTINUED | OUTPATIENT
Start: 2022-11-18 | End: 2022-11-18 | Stop reason: CLARIF

## 2022-11-18 RX ADMIN — METHYLPREDNISOLONE SODIUM SUCCINATE 125 MG: 125 INJECTION, POWDER, FOR SOLUTION INTRAMUSCULAR; INTRAVENOUS at 12:26

## 2022-11-18 RX ADMIN — BENZONATATE 200 MG: 100 CAPSULE ORAL at 21:47

## 2022-11-18 RX ADMIN — Medication 1 CAPSULE: at 16:49

## 2022-11-18 RX ADMIN — IPRATROPIUM BROMIDE AND ALBUTEROL SULFATE 1 AMPULE: .5; 2.5 SOLUTION RESPIRATORY (INHALATION) at 11:39

## 2022-11-18 RX ADMIN — SODIUM CHLORIDE: 9 INJECTION, SOLUTION INTRAVENOUS at 16:26

## 2022-11-18 RX ADMIN — BENZONATATE 200 MG: 100 CAPSULE ORAL at 17:00

## 2022-11-18 RX ADMIN — IPRATROPIUM BROMIDE AND ALBUTEROL SULFATE 1 AMPULE: .5; 2.5 SOLUTION RESPIRATORY (INHALATION) at 11:40

## 2022-11-18 RX ADMIN — ENOXAPARIN SODIUM 40 MG: 100 INJECTION SUBCUTANEOUS at 16:49

## 2022-11-18 RX ADMIN — ARFORMOTEROL TARTRATE 15 MCG: 15 SOLUTION RESPIRATORY (INHALATION) at 20:25

## 2022-11-18 RX ADMIN — IPRATROPIUM BROMIDE AND ALBUTEROL SULFATE 1 AMPULE: .5; 2.5 SOLUTION RESPIRATORY (INHALATION) at 11:38

## 2022-11-18 RX ADMIN — IPRATROPIUM BROMIDE AND ALBUTEROL SULFATE 1 AMPULE: 2.5; .5 SOLUTION RESPIRATORY (INHALATION) at 20:24

## 2022-11-18 RX ADMIN — PHENOL 1 SPRAY: 1.5 LIQUID ORAL at 20:00

## 2022-11-18 RX ADMIN — METHYLPREDNISOLONE SODIUM SUCCINATE 60 MG: 125 INJECTION, POWDER, LYOPHILIZED, FOR SOLUTION INTRAMUSCULAR; INTRAVENOUS at 21:27

## 2022-11-18 RX ADMIN — BUDESONIDE 250 MCG: 0.25 SUSPENSION RESPIRATORY (INHALATION) at 20:25

## 2022-11-18 RX ADMIN — IPRATROPIUM BROMIDE AND ALBUTEROL SULFATE 1 AMPULE: 2.5; .5 SOLUTION RESPIRATORY (INHALATION) at 16:23

## 2022-11-18 RX ADMIN — ACETAMINOPHEN 650 MG: 325 TABLET ORAL at 14:10

## 2022-11-18 ASSESSMENT — ENCOUNTER SYMPTOMS
VOMITING: 0
NAUSEA: 1
COUGH: 1
SHORTNESS OF BREATH: 1
CONSTIPATION: 0
EYE PAIN: 0
DIARRHEA: 1
RHINORRHEA: 1
ABDOMINAL PAIN: 0

## 2022-11-18 ASSESSMENT — PAIN - FUNCTIONAL ASSESSMENT: PAIN_FUNCTIONAL_ASSESSMENT: ACTIVITIES ARE NOT PREVENTED

## 2022-11-18 ASSESSMENT — PAIN DESCRIPTION - DESCRIPTORS: DESCRIPTORS: SORE

## 2022-11-18 ASSESSMENT — PAIN DESCRIPTION - LOCATION: LOCATION: THROAT

## 2022-11-18 ASSESSMENT — PAIN SCALES - GENERAL: PAINLEVEL_OUTOF10: 7

## 2022-11-18 NOTE — ED PROVIDER NOTES
Stephania Morrison is a 81 y.o. male    Chief Complaint   Patient presents with    Shortness of Breath     Pt with COPD Being treated by Dr Lizzie Saleh for fungal infection in lungs    Chills         BILL Morrison is a 81 y.o. male presenting to the ED for Shortness of Breath (Pt with COPD Being treated by Dr Lizzie Saleh for fungal infection in lungs) and Chills    History comes primarily from the patient. He is a 79-year-old male with a history of COPD on 4 L O2 at baseline presenting with increased shortness of breath. He endorses intermittent fever, rhinorrhea, chills, cough with yellow sputum, headache, nausea for the last couple days as well as diarrhea x1 day. Denies chest pain, abdominal pain He was seen by his PCP yesterday and was noted to have a O2 sat of 81%. He was placed on 6 L here in the ED with improvement to 91%. He is followed by Dr. Eve Mcgee (pulmonology) and Dr. Jorje Garcia (ID). He has had an extensive history of respiratory infections for which Dr. Jorje Garcia is managing his antibiotic treatment for various respiratory infections. The patient is currently on azithromycin 500 mg 3 times a week. Of note, he recently completed pulmonary physical therapy. Review of Systems   Constitutional:  Positive for chills and fever. HENT:  Positive for rhinorrhea. Negative for ear pain. Eyes:  Negative for pain. Respiratory:  Positive for cough and shortness of breath. Cardiovascular:  Negative for chest pain and palpitations. Gastrointestinal:  Positive for diarrhea and nausea. Negative for abdominal pain, constipation and vomiting. Genitourinary:  Negative for difficulty urinating and dysuria. Musculoskeletal:  Negative for arthralgias and myalgias. Skin:  Negative for rash. Neurological:  Positive for headaches. Negative for dizziness. All other systems reviewed and are negative. Physical Exam  Constitutional:       Appearance: Normal appearance.    HENT:      Head: Normocephalic and atraumatic. Nose: Nose normal.   Eyes:      Extraocular Movements: Extraocular movements intact. Pupils: Pupils are equal, round, and reactive to light. Cardiovascular:      Rate and Rhythm: Regular rhythm. Tachycardia present. Pulmonary:      Effort: No accessory muscle usage. Breath sounds: Normal breath sounds. No wheezing. Comments: 91% on 6L O2 via NC  Abdominal:      General: Abdomen is flat. Palpations: Abdomen is soft. Tenderness: There is no abdominal tenderness. Musculoskeletal:         General: Normal range of motion. Cervical back: Normal range of motion. Skin:     General: Skin is warm and dry. Neurological:      General: No focal deficit present. Mental Status: He is alert and oriented to person, place, and time. Psychiatric:         Behavior: Behavior normal.        Procedures     MDM     Patient presented to the Emergency Department for Shortness of Breath (Pt with COPD Being treated by Dr Nhi Lombardo for fungal infection in lungs) and Chills    He is a 72-year-old male with a history of COPD on 4 L O2 at baseline presenting with increased shortness of breath, cough with yellow sputum, fever, rhinorrhea, headache, nausea, diarrhea x 2-3 days. Seen by PCP yesterday and told to come to the emergency department if symptoms worsen. Followed by Dr. Martha Horta (pulmonology) and Dr. Kim Elizabeth (ID). Patient currently on azithromycin 500mg 3x/week. CBC and BMP were unremarkable. Respiratory panel pending. EKG shows sinus tachycardia with ocaissional. CXR shows unchanged emphysema, fibrosis, likely pulmonary arterial hypertension and right-sided pleural thickening. Patient given duo nebs and Solu-Medrol with some improvement of his symptoms. Pulmonology consulted. Patient admitted to Dr. Ty Cat service. EKG: This EKG is signed and interpreted by me.     Rate: 111  Rhythm: Sinus tachycardia  Axis: normal  Interpretation: sinus tachycardia and PVCs  Comparison: changes compared to previous EKG 4/10/22 with new PVCs    ED Course as of 11/18/22 1529   Fri Nov 18, 2022   1245 Patient feels some improvement after breathing treatments. I discussed plan for admission and he agrees with the plan. [KM]   46 Spoke with Dr. Carolina Sutton about admission of the patient [KM]      ED Course User Index  [KM] Monserrat Enriquez MD       --------------------------------------------- PAST HISTORY ---------------------------------------------  Past Medical History:  has a past medical history of Acute and chronic respiratory failure with hypoxia ([address] Acute heart failure with preserved ejection fraction ([address] Acute respiratory disease due to severe acute respiratory syndrome coronavirus 2 (SARS-CoV-2), Acute respiratory failure with hypoxia ([address] Bullous emphysema ([address] Chronic back pain, Colon cancer screening, COPD (chronic obstructive pulmonary disease) ([address] Coronavirus infection, Cough with hemoptysis, 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 Past Surgical History:  has a past surgical history that includes Abscess Drainage (2006); Colonoscopy (11/18/2013); lumbar fusion (03/2019); bronchoscopy (N/A, 6/12/2019); bronchoscopy (N/A, 10/7/2019); Insert Picc Cath, 5/> Yrs (4/10/2021); and bronchoscopy (N/A, 4/12/2021). Social History:  reports that he quit smoking about 8 years ago. His smoking use included cigarettes.  He started smoking about 54 years ago. He has a 92.00 pack-year smoking history. He has never used smokeless tobacco. He reports that he does not drink alcohol and does not use drugs. Family History: family history includes Other in his father and mother. The patients home medications have been reviewed. Allergies: Patient has no known allergies.     -------------------------------------------------- RESULTS -------------------------------------------------    LABS:  Results for orders placed or performed during the hospital encounter of 11/18/22   Respiratory Panel, Molecular, with COVID-19 (Restricted: peds pts or suitable admitted adults)    Specimen: Nasopharyngeal   Result Value Ref Range    Adenovirus by PCR Not Detected Not Detected    Bordetella parapertussis by PCR Not Detected Not Detected    Bordetella pertussis by PCR Not Detected Not Detected    Chlamydophilia pneumoniae by PCR Not Detected Not Detected    Coronavirus 229E by PCR Not Detected Not Detected    Coronavirus HKU1 by PCR Not Detected Not Detected    Coronavirus NL63 by PCR Not Detected Not Detected    Coronavirus OC43 by PCR Not Detected Not Detected    SARS-CoV-2, PCR Not Detected Not Detected    Human Metapneumovirus by PCR Not Detected Not Detected    Human Rhinovirus/Enterovirus by PCR Not Detected Not Detected    Influenza A by PCR Not Detected Not Detected    Influenza B by PCR Not Detected Not Detected    Mycoplasma pneumoniae by PCR Not Detected Not Detected    Parainfluenza Virus 1 by PCR Not Detected Not Detected    Parainfluenza Virus 2 by PCR Not Detected Not Detected    Parainfluenza Virus 3 by PCR Not Detected Not Detected    Parainfluenza Virus 4 by PCR Not Detected Not Detected    Respiratory Syncytial Virus by PCR DETECTED (A) Not Detected   CBC with Auto Differential   Result Value Ref Range    WBC 9.4 4.5 - 11.5 E9/L    RBC 5.46 3.80 - 5.80 E12/L    Hemoglobin 17.0 (H) 12.5 - 16.5 g/dL    Hematocrit 50.6 37.0 - 54.0 %    MCV 92.7 80.0 - 99.9 fL    MCH 31.1 26.0 - 35.0 pg    MCHC 33.6 32.0 - 34.5 %    RDW 13.9 11.5 - 15.0 fL    Platelets 861 182 - 374 E9/L    MPV 10.0 7.0 - 12.0 fL    Neutrophils % 85.5 (H) 43.0 - 80.0 %    Immature Granulocytes % 0.3 0.0 - 5.0 %    Lymphocytes % 6.7 (L) 20.0 - 42.0 %    Monocytes % 7.2 2.0 - 12.0 %    Eosinophils % 0.1 0.0 - 6.0 %    Basophils % 0.2 0.0 - 2.0 %    Neutrophils Absolute 7.99 (H) 1.80 - 7.30 E9/L    Immature Granulocytes # 0.03 E9/L    Lymphocytes Absolute 0.63 (L) 1.50 - 4.00 E9/L    Monocytes Absolute 0.67 0.10 - 0.95 E9/L    Eosinophils Absolute 0.01 (L) 0.05 - 0.50 E9/L    Basophils Absolute 0.02 0.00 - 0.20 O8/M   Basic Metabolic Panel w/ Reflex to MG   Result Value Ref Range    Sodium 136 132 - 146 mmol/L    Potassium reflex Magnesium 4.4 3.5 - 5.0 mmol/L    Chloride 103 98 - 107 mmol/L    CO2 24 22 - 29 mmol/L    Anion Gap 9 7 - 16 mmol/L    Glucose 82 74 - 99 mg/dL    BUN 6 6 - 23 mg/dL    Creatinine 0.8 0.7 - 1.2 mg/dL    Est, Glom Filt Rate >60 >=60 mL/min/1.73    Calcium 9.7 8.6 - 10.2 mg/dL   EKG 12 Lead   Result Value Ref Range    Ventricular Rate 111 BPM    Atrial Rate 111 BPM    P-R Interval 176 ms    QRS Duration 98 ms    Q-T Interval 332 ms    QTc Calculation (Bazett) 451 ms    P Axis 60 degrees    R Axis -69 degrees    T Axis 73 degrees       RADIOLOGY:  XR CHEST PORTABLE   Final Result   Unchanged emphysema, fibrosis, likely pulmonary arterial hypertension and   right-sided pleural thickening.               ------------------------- NURSING NOTES AND VITALS REVIEWED ---------------------------  Date / Time Roomed:  11/18/2022 10:36 AM  ED Bed Assignment:  [id number]/[id number]    The nursing notes within the ED encounter and vital signs as below have been reviewed.      Patient Vitals for the past 24 hrs:   BP Temp Temp src Pulse Resp SpO2 Height Weight   11/18/22 1517 105/67 99.6 °F (37.6 °C) Oral (!) 102 22 90 % -- --   11/18/22 1449 (!) 91/59 100 °F (37.8 °C) Oral (!) 104 16 93 % -- -- 11/18/22 1405 97/60 100 °F (37.8 °C) Oral (!) 115 18 94 % -- --   11/18/22 1341 -- -- -- (!) 110 -- 92 % -- --   11/18/22 1259 -- -- -- (!) 115 -- 93 % -- --   11/18/22 1140 -- -- -- (!) 108 -- (!) 82 % -- --   11/18/22 1138 -- -- -- (!) 108 -- (!) 82 % -- --   11/18/22 1130 132/71 -- -- (!) 106 20 94 % -- --   11/18/22 1033 -- 99.1 °F (37.3 °C) Temporal (!) 115 30 91 % 6' 1\" (1.854 m) 195 lb (88.5 kg)       Oxygen Saturation Interpretation: Normal    ------------------------------------------ PROGRESS NOTES ------------------------------------------  Re-evaluation(s):  Time: 6370  Patients symptoms are improving  Repeat physical examination is improved    Counseling:  I have spoken with the patient and discussed todays results, in addition to providing specific details for the plan of care and counseling regarding the diagnosis and prognosis. Their questions are answered at this time and they are agreeable with the plan of admission.    --------------------------------- ADDITIONAL PROVIDER NOTES ---------------------------------  Consultations:  Time: 1301. Spoke with Dr. Inez Fishman. Discussed case. They will admit the patient. This patient's ED course included: a personal history and physicial examination, re-evaluation prior to disposition, multiple bedside re-evaluations, IV medications, continuous pulse oximetry, and complex medical decision making and emergency management    This patient has remained hemodynamically stable during their ED course. Diagnosis:  1. Acute respiratory failure with hypoxia ([address]    2. COPD exacerbation ([facility]        Disposition:  Patient's disposition: Admit to med/surg floor  Patient's condition is stable. Strict return precautions were discussed including but not limited too new or worsening symtpoms. They verbalized understanding and were agreeable with the plan. All questions were answered and patient was admitted.        Michelle Cox MD  Resident  11/18/22 7868

## 2022-11-18 NOTE — ED NOTES
Report given to [facility]FFERY  11/18/22 5600

## 2022-11-18 NOTE — H&P
History and Physical      CHIEF COMPLAINT: Increasing shortness of breath    History of Present Illness: 68-year-old male patient of Dr. Juna Epley I am asked admit and follow. History obtained from patient as well as extensive review electronic record. He was seen in Dr. Valdovinos Aleida office 11/17 due to sore throat head congestion and mild body aches. At the office visit he was on 4 L nasal cannula which is his normal.  Not expectorating significant sputum. Overnight he began having intermittent fevers chills increasing cough with yellow sputum headache and diarrhea. Also continues to have sore throat mostly left-sided. O2 saturation at time of office visit was 81%. In the ED he was placed on 6 L with improvement to 91% SPO2. On admission to the ED temperature 99.1 kelly to 100. Pulse on presentation 115, sinus tachycardia. Blood pressure on admission 132/71; currently 91/59. Current SPO2 is 93 on 6 L nasal cannula. Chest x-ray in the ED as follows--    FINDINGS:   Stable pulmonary emphysema and fibrosis with likely pulmonary arterial   hypertension. Stable right-sided pleural thickening. Impression:       Unchanged emphysema, fibrosis, likely pulmonary arterial hypertension and   right-sided pleural thickening. Labs in the ED unremarkable random glucose of 82; hemoglobin 17.0 which is normal for him. WBC 9.4. There were 85.5% neutrophils with absolute neutrophils increased to 7.99. Rhinovirus and influenza by PCR were all not detected. Molecular/PCR viral respiratory panel however did detect RSV. --Admission 4/10/2022 due to acute exacerbation of COPD. --Admission of 3/14/2022 due to acute and chronic respiratory failure with hypoxia and COPD exacerbation. He was also found to have heart failure preserved ejection fraction; 2D echo with following results--     Summary   Left ventricle grossly normal in size. Normal LV segmental wall motion.    Normal left ventricular wall thickness. Estimated left ventricular ejection fraction is 62±5%. Does not meet 50% diagnostic criteria for diastolic dysfunction. The LAESV Index is <34ml/m2. Moderately dilated right ventricle. TAPSE is normal   Physiologic and/or trace mitral regurgitation is present. Technically fair quality study. Compared to prior echo, no significant changes noted. Suggest clinical correlation. --Hospital admission 2022 due to acute respiratory failure with hypoxia. --Admission to the hospital 2021 due to acute heart failure with preserved ejection fraction  --Hospitalization 2021 due to acute respiratory failure due to SARS-CoV-2  --Found to have thoracic ascending aortic aneurysm 11/15/2018; proximal ascending aorta 3.8 cm  --Patient admitted 2019 due to parainfluenza virus pneumonia. The above was extremely slow resolving with left upper lung infiltrate. He then underwent bronchoscopy with BAL. --Fungal results became available approximately 2019, positive Fungitell. --On 2019 sensitivities were available and ID recommended starting intravenous amikacin 1400 mg IV daily; rifampin 600 mg by mouth daily; clarithromycin 1000 mg/day ethambutol 1200 mg daily by mouth. 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 --Patient quit smoking , 92-pack-year history  --Patient has been vaccinated for COVID-19 x3 and received 2 booster vaccinations  --Denies any prior history of rheumatic fever scarlet fever polio diphtheria cancer. Patient is known glucose intolerance and is currently on sliding scale insulin coverage; he is on IV methylprednisolone .   --Screening colonoscopy 2013 with polypectomy      Past Medical History:   Diagnosis Date    Acute and chronic respiratory failure with hypoxia ([address] 10/12/2017    Acute heart failure with preserved ejection fraction ([address] 2021    Acute respiratory disease due to severe acute respiratory syndrome coronavirus 2 (SARS-CoV-2) 01/01/2021    Acute respiratory failure with hypoxia (HCC) 11/12/2018    Bullous emphysema ([address] 11/12/2018    Chronic back pain     Degeneration of umbar intervertebral disc    Colon cancer screening     COPD (chronic obstructive pulmonary disease) ([address]     Coronavirus infection 2/2/2022    Coronavirus 229E    Cough with hemoptysis 04/23/2019    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 ([address] 08/15/2019    First seen by ID; treatment started 9/4/2019    Emphysema lung ([address]     Glucose intolerance 06/10/2019    Hilar adenopathy 06/10/2019    Hypophosphatemia 06/10/2019    Immunocompromised ([address] 2/3/2022    Infection due to parainfluenza virus 3 06/10/2019    Iron deficiency 2/3/2022    Left upper lobe pneumonia 10/12/2017    Pleural effusion on right 10/03/2019    Pulmonary emphysema with fibrosis of lung ([address] 11/15/2018    Pulmonary Mycobacterium avium complex (MAC) infection ([address] 07/12/2019    BAL results finally completed this date    Rhinovirus infection 10/16/2020    Right lower lobe pneumonia 11/15/2018    Recurrent 4/23/19    S/P lumbar fusion 06/01/2019    Thoracic ascending aortic aneurysm 11/15/2018    Proximal ascending aorta; 3.8 cm; 11/15/18         Past Surgical History:   Procedure Laterality Date    ABSCESS DRAINAGE  2006    X2 RECTAL ABSCESS    BRONCHOSCOPY N/A 6/12/2019    BRONCHOSCOPY BRUSHINGS performed by Seema Barnes MD at [address] [facility] N/A 10/7/2019    BRONCHOSCOPY DIAGNOSTIC OR CELL [address] ONLY performed by Seema Barnes MD at [address] [facility] N/A 4/12/2021    BRONCHOSCOPY performed by Seema Barnes MD at [address] [facility] [place]  11/18/2013    HC INSERT PICC CATH, 5/> YRS  4/10/2021         LUMBAR FUSION  03/2019    [facility]       Medications Prior to Admission:    Medications Prior to Admission: ipratropium-albuterol (DUONEB) 0.5-2.5 (3) MG/3ML SOLN nebulizer solution, Inhale 1 vial into the lungs every 4 hours as needed for Shortness of Breath  pantoprazole (PROTONIX) 40 MG tablet, Take 40 mg by mouth daily as needed (EPIGASTRIC DISCOMFORT)  budesonide (PULMICORT) 0.5 MG/2ML nebulizer suspension, Take 1 ampule by nebulization 2 times daily  OXYGEN, Inhale 4-4.5 L/min into the lungs continuous  azithromycin (ZITHROMAX) 250 MG tablet, Take 1 tablet by mouth See Admin Instructions for 5 days 500mg on day 1 followed by 250mg on days 2 - 5  azithromycin (ZITHROMAX) 250 MG tablet, Take 500 mg by mouth every other day Indications: Mild Upper Respiratory Infection  formoterol (PERFOROMIST) 20 MCG/2ML nebulizer solution, Take 2 mLs by nebulization 2 times daily  oxyCODONE-acetaminophen (PERCOCET) 7.5-325 MG per tablet, Take 1 tablet by mouth 2 times daily as needed for Pain. Allergies:    Patient has no known allergies. Social History:    reports that he quit smoking about 8 years ago. His smoking use included cigarettes. He started smoking about 54 years ago. He has a 92.00 pack-year smoking history. He has never used smokeless tobacco. He reports that he does not drink alcohol and does not use drugs. Family History:   family history includes Other in his father and mother. REVIEW OF SYSTEMS:  As above in the HPI, otherwise negative    PHYSICAL EXAM:    VS: BP (!) 91/59   Pulse (!) 104   Temp 100 °F (37.8 °C) (Oral)   Resp 16   Ht 6' 1\" (1.854 m)   Wt 195 lb (88.5 kg)   SpO2 93%   BMI 25.73 kg/m²     General appearance: Alert, Awake, Oriented times 3, no distress; nasal cannula oxygen in place  Skin: Warm and dry ; no rashes  Head: Normocephalic. No masses, lesions or tenderness noted  Eyes: Conjunctivae pink, sclera white. PERRL,EOM-I  Ears: External ears normal  Nose/Sinuses: Nares normal. Septum midline. Mucosa normal. No drainage  Oropharynx: Oropharynx clear with no exudate seen  Neck: Supple.  No jugular venous distension, lymphadenopathy or thyromegaly Trachea midline  Lungs: Amazingly mostly clear with mild decreased in excursion  Heart: S1 S2  Regular rate and rhythm. No rub, gallop, murmur  Abdomen: Soft, non-tender. BS normal. No masses, organomegaly; no rebound or guarding  Extremities: No edema, Peripheral pulses palpable  Musculoskeletal: Muscular strength appears intact. Neuro:  No focal motor defects ; II-XII grossly intact .  GLASER equally  Breast: deferred  Rectal: deferred  Genitalia:  deferred    LABS:  CBC:   Lab Results   Component Value Date/Time    WBC 9.4 11/18/2022 11:23 AM    RBC 5.46 11/18/2022 11:23 AM    HGB 17.0 11/18/2022 11:23 AM    HCT 50.6 11/18/2022 11:23 AM    MCV 92.7 11/18/2022 11:23 AM    MCH 31.1 11/18/2022 11:23 AM    MCHC 33.6 11/18/2022 11:23 AM    RDW 13.9 11/18/2022 11:23 AM     11/18/2022 11:23 AM    MPV 10.0 11/18/2022 11:23 AM     CBC with Differential:    Lab Results   Component Value Date/Time    WBC 9.4 11/18/2022 11:23 AM    RBC 5.46 11/18/2022 11:23 AM    HGB 17.0 11/18/2022 11:23 AM    HCT 50.6 11/18/2022 11:23 AM     11/18/2022 11:23 AM    MCV 92.7 11/18/2022 11:23 AM    MCH 31.1 11/18/2022 11:23 AM    MCHC 33.6 11/18/2022 11:23 AM    RDW 13.9 11/18/2022 11:23 AM    NRBC 0.0 04/14/2022 03:30 AM    METASPCT 0.9 04/13/2022 07:01 AM    LYMPHOPCT 6.7 11/18/2022 11:23 AM    MONOPCT 7.2 11/18/2022 11:23 AM    MYELOPCT 0.9 04/07/2021 10:35 AM    BASOPCT 0.2 11/18/2022 11:23 AM    MONOSABS 0.67 11/18/2022 11:23 AM    LYMPHSABS 0.63 11/18/2022 11:23 AM    EOSABS 0.01 11/18/2022 11:23 AM    BASOSABS 0.02 11/18/2022 11:23 AM     Hemoglobin/Hematocrit:    Lab Results   Component Value Date/Time    HGB 17.0 11/18/2022 11:23 AM    HCT 50.6 11/18/2022 11:23 AM     CMP:    Lab Results   Component Value Date/Time     11/18/2022 11:23 AM    K 4.4 11/18/2022 11:23 AM     11/18/2022 11:23 AM    CO2 24 11/18/2022 11:23 AM    BUN 6 11/18/2022 11:23 AM    CREATININE 0.8 11/18/2022 11:23 AM    GFRAA >60 08/01/2022 03:55 PM    LABGLOM >60 11/18/2022 11:23 AM    GLUCOSE 82 11/18/2022 11:23 AM    PROT 7.0 08/01/2022 03:55 PM    LABALBU 4.8 08/01/2022 03:55 PM    CALCIUM 9.7 11/18/2022 11:23 AM    BILITOT 0.8 08/01/2022 03:55 PM    ALKPHOS 65 08/01/2022 03:55 PM    AST 22 08/01/2022 03:55 PM    ALT 13 08/01/2022 03:55 PM     BMP:    Lab Results   Component Value Date/Time     11/18/2022 11:23 AM    K 4.4 11/18/2022 11:23 AM     11/18/2022 11:23 AM    CO2 24 11/18/2022 11:23 AM    BUN 6 11/18/2022 11:23 AM    LABALBU 4.8 08/01/2022 03:55 PM    CREATININE 0.8 11/18/2022 11:23 AM    CALCIUM 9.7 11/18/2022 11:23 AM    GFRAA >60 08/01/2022 03:55 PM    LABGLOM >60 11/18/2022 11:23 AM    GLUCOSE 82 11/18/2022 11:23 AM     Hepatic Function Panel:    Lab Results   Component Value Date/Time    ALKPHOS 65 08/01/2022 03:55 PM    ALT 13 08/01/2022 03:55 PM    AST 22 08/01/2022 03:55 PM    PROT 7.0 08/01/2022 03:55 PM    BILITOT 0.8 08/01/2022 03:55 PM    BILIDIR <0.2 04/14/2022 03:30 AM    IBILI see below 04/14/2022 03:30 AM    LABALBU 4.8 08/01/2022 03:55 PM     Ionized Calcium:  No results found for: IONCA  Magnesium:    Lab Results   Component Value Date/Time    MG 2.0 04/14/2022 03:30 AM     Phosphorus:    Lab Results   Component Value Date/Time    PHOS 3.6 04/14/2022 03:30 AM     LDH:    Lab Results   Component Value Date/Time     01/02/2021 05:55 PM     Uric Acid:    Lab Results   Component Value Date/Time    LABURIC 4.6 03/15/2022 03:48 AM     PT/INR:    Lab Results   Component Value Date/Time    PROTIME 12.5 05/30/2019 11:38 AM    INR 1.1 05/30/2019 11:38 AM     Warfarin PT/INR:  No components found for: PTPATWAR, PTINRWAR  PTT:    Lab Results   Component Value Date/Time    APTT 31.4 05/30/2019 11:38 AM   [APTT}  Troponin:    Lab Results   Component Value Date/Time    TROPONINI <0.01 04/08/2021 06:10 PM     Last 3 Troponin:    Lab Results   Component Value Date/Time [place] <0.01 04/08/2021 06:10 PM    TROPONINI <0.01 04/08/2021 12:50 PM    TROPONINI <0.01 01/02/2021 05:55 PM     U/A:    Lab Results   Component Value Date/Time    COLORU Yellow 02/01/2022 06:09 PM    PROTEINU Negative 02/01/2022 06:09 PM    PHUR 5.5 02/01/2022 06:09 PM    LABCAST FEW 10/02/2019 05:32 PM    WBCUA NONE 04/07/2021 10:35 AM    RBCUA NONE 04/07/2021 10:35 AM    MUCUS Present 04/07/2021 10:35 AM    BACTERIA FEW 04/07/2021 10:35 AM    CLARITYU Clear 02/01/2022 06:09 PM    SPECGRAV 1.020 02/01/2022 06:09 PM    LEUKOCYTESUR Negative 02/01/2022 06:09 PM    UROBILINOGEN 0.2 02/01/2022 06:09 PM    BILIRUBINUR MODERATE 02/01/2022 06:09 PM    BLOODU Negative 02/01/2022 06:09 PM    GLUCOSEU Negative 02/01/2022 06:09 PM     ABG:    Lab Results   Component Value Date/Time    PH 7.428 04/10/2022 04:17 PM    PCO2 29.0 04/10/2022 04:17 PM    PO2 60.5 04/10/2022 04:17 PM    HCO3 18.7 04/10/2022 04:17 PM    BE -4.1 04/10/2022 04:17 PM    O2SAT 91.7 04/10/2022 04:17 PM     HgBA1c:    Lab Results   Component Value Date/Time    LABA1C 5.3 04/11/2022 12:25 AM     FLP:    Lab Results   Component Value Date/Time    TRIG 43 04/11/2022 12:25 AM    HDL 76 04/11/2022 12:25 AM    LDLCALC 62 04/11/2022 12:25 AM    LABVLDL 9 04/11/2022 12:25 AM     TSH:    Lab Results   Component Value Date/Time    TSH 0.566 04/11/2022 12:25 AM     VITAMIN B12: No components found for: B12  FOLATE:    Lab Results   Component Value Date/Time    FOLATE 13.1 04/11/2022 12:25 AM     IRON:    Lab Results   Component Value Date/Time    IRON 61 04/11/2022 07:00 AM     Iron Saturation:  No components found for: PERCENTFE  TIBC:    Lab Results   Component Value Date/Time    TIBC 307 04/11/2022 07:00 AM     FERRITIN:    Lab Results   Component Value Date/Time    FERRITIN 144 04/11/2022 07:00 AM       RADIOLOGY:  XR CHEST PORTABLE   Final Result   Unchanged emphysema, fibrosis, likely pulmonary arterial hypertension and   right-sided pleural thickening. ASSESSMENT:      Active Hospital Problems    Diagnosis     COPD exacerbation ([facility] [J44.1]      Priority: Medium    Acute respiratory failure with hypoxia (HCC) [J96.01]      Priority: Medium    Immunocompromised ([facility] [D84.9]     Iron deficiency [E61.1]     Chronic pain syndrome [G89.4]     Chronic respiratory failure with hypoxia (HCC) [J96.11]     Pulmonary Mycobacterium avium complex (MAC) infection ([facility] [A31.0]     Glucose intolerance [E74.39]     Thoracic ascending aortic aneurysm [I71.21]     Bullous emphysema (HCC) [J43.9]     COPD (chronic obstructive pulmonary disease) ([facility] [J44.9]        PLAN:  Medications discussed with patient  GI prophylaxis  DVT prophylaxis  Pulmonary has been consulted  Azithromycin by mouth 500 mg every other day for MAC  Tessalon Perles 200 mg 3 times daily  Cepacol lozenges every 2 hours as needed  Arformoterol 15 mcg twice daily  Budesonide 250 mcg twice daily  Ceftriaxone 1 g IV every 24 hours will be discontinued since RSV has been found  Enoxaparin 40 mg subcu daily  DuoNeb every 4 hours  He received methylprednisolone 125 mg once in the ED; continue methylprednisolone 60 mg IV every 8 hours  IV sodium chloride 0.9% 50 cc an hour  Percocet 5, 2 times daily as needed for pain  Potassium replacement protocol  Blood and urine cultures  Procalcitonin  Legionella and strep antigens  Repeat 2D echo since he did have heart failure preserved ejection fraction earlier this year  Troponins      Please note that over 50 minutes was spent in evaluating the patient, review of records and results, discussion with staff/family, etc.    [address]    3:12 PM  11/18/2022    Voice recognition software use for dictation

## 2022-11-18 NOTE — ED NOTES
Pt came to pivot desk with O2 at 5L NC from home SaO2 67% O2 increased to 1031 Cara West RN  11/18/22 1032

## 2022-11-19 LAB
ALBUMIN SERPL-MCNC: 4.4 G/DL (ref 3.5–5.2)
ALP BLD-CCNC: 72 U/L (ref 40–129)
ALT SERPL-CCNC: 13 U/L (ref 0–40)
AST SERPL-CCNC: 20 U/L (ref 0–39)
ATYPICAL LYMPHOCYTE RELATIVE PERCENT: 0.9 % (ref 0–4)
BASOPHILS ABSOLUTE: 0 E9/L (ref 0–0.2)
BASOPHILS RELATIVE PERCENT: 0 % (ref 0–2)
BILIRUB SERPL-MCNC: 0.5 MG/DL (ref 0–1.2)
BILIRUBIN DIRECT: <0.2 MG/DL (ref 0–0.3)
BILIRUBIN, INDIRECT: NORMAL MG/DL (ref 0–1)
BURR CELLS: ABNORMAL
C-REACTIVE PROTEIN: 2.6 MG/DL (ref 0–0.4)
CALCIUM IONIZED: 1.35 MMOL/L (ref 1.15–1.33)
CHOLESTEROL, TOTAL: 155 MG/DL (ref 0–199)
EOSINOPHILS ABSOLUTE: 0 E9/L (ref 0.05–0.5)
EOSINOPHILS RELATIVE PERCENT: 0 % (ref 0–6)
FERRITIN: 151 NG/ML
FOLATE: 8.8 NG/ML (ref 4.8–24.2)
HBA1C MFR BLD: 5.3 % (ref 4–5.6)
HCT VFR BLD CALC: 51 % (ref 37–54)
HDLC SERPL-MCNC: 67 MG/DL
HEMOGLOBIN: 17.1 G/DL (ref 12.5–16.5)
IRON SATURATION: 15 % (ref 20–55)
IRON: 51 MCG/DL (ref 59–158)
L. PNEUMOPHILA SEROGP 1 UR AG: NORMAL
LACTIC ACID, SEPSIS: 3 MMOL/L (ref 0.5–1.9)
LDL CHOLESTEROL CALCULATED: 75 MG/DL (ref 0–99)
LYMPHOCYTES ABSOLUTE: 0.18 E9/L (ref 1.5–4)
LYMPHOCYTES RELATIVE PERCENT: 0.9 % (ref 20–42)
MAGNESIUM: 1.9 MG/DL (ref 1.6–2.6)
MCH RBC QN AUTO: 31.2 PG (ref 26–35)
MCHC RBC AUTO-ENTMCNC: 33.5 % (ref 32–34.5)
MCV RBC AUTO: 93.1 FL (ref 80–99.9)
MONOCYTES ABSOLUTE: 0 E9/L (ref 0.1–0.95)
MONOCYTES RELATIVE PERCENT: 0 % (ref 2–12)
NEUTROPHILS ABSOLUTE: 8.62 E9/L (ref 1.8–7.3)
NEUTROPHILS RELATIVE PERCENT: 98.2 % (ref 43–80)
NUCLEATED RED BLOOD CELLS: 0 /100 WBC
PDW BLD-RTO: 13.7 FL (ref 11.5–15)
PHOSPHORUS: 2.4 MG/DL (ref 2.5–4.5)
PLATELET # BLD: 157 E9/L (ref 130–450)
PMV BLD AUTO: 10.1 FL (ref 7–12)
POIKILOCYTES: ABNORMAL
RBC # BLD: 5.48 E12/L (ref 3.8–5.8)
SEDIMENTATION RATE, ERYTHROCYTE: 2 MM/HR (ref 0–15)
STREP PNEUMONIAE ANTIGEN, URINE: NORMAL
TOTAL IRON BINDING CAPACITY: 337 MCG/DL (ref 250–450)
TOTAL PROTEIN: 6.8 G/DL (ref 6.4–8.3)
TRIGL SERPL-MCNC: 67 MG/DL (ref 0–149)
TROPONIN, HIGH SENSITIVITY: 7 NG/L (ref 0–11)
TSH SERPL DL<=0.05 MIU/L-ACNC: 0.4 UIU/ML (ref 0.27–4.2)
URIC ACID, SERUM: 5.6 MG/DL (ref 3.4–7)
VITAMIN B-12: 324 PG/ML (ref 211–946)
VLDLC SERPL CALC-MCNC: 13 MG/DL
WBC # BLD: 8.8 E9/L (ref 4.5–11.5)

## 2022-11-19 PROCEDURE — 36415 COLL VENOUS BLD VENIPUNCTURE: CPT

## 2022-11-19 PROCEDURE — 84484 ASSAY OF TROPONIN QUANT: CPT

## 2022-11-19 PROCEDURE — 82728 ASSAY OF FERRITIN: CPT

## 2022-11-19 PROCEDURE — 84443 ASSAY THYROID STIM HORMONE: CPT

## 2022-11-19 PROCEDURE — 82330 ASSAY OF CALCIUM: CPT

## 2022-11-19 PROCEDURE — 80061 LIPID PANEL: CPT

## 2022-11-19 PROCEDURE — 82607 VITAMIN B-12: CPT

## 2022-11-19 PROCEDURE — 2700000000 HC OXYGEN THERAPY PER DAY

## 2022-11-19 PROCEDURE — 82746 ASSAY OF FOLIC ACID SERUM: CPT

## 2022-11-19 PROCEDURE — 2580000003 HC RX 258: Performed by: INTERNAL MEDICINE

## 2022-11-19 PROCEDURE — 6370000000 HC RX 637 (ALT 250 FOR IP): Performed by: INTERNAL MEDICINE

## 2022-11-19 PROCEDURE — 94640 AIRWAY INHALATION TREATMENT: CPT

## 2022-11-19 PROCEDURE — 6360000002 HC RX W HCPCS: Performed by: INTERNAL MEDICINE

## 2022-11-19 PROCEDURE — 83540 ASSAY OF IRON: CPT

## 2022-11-19 PROCEDURE — 85025 COMPLETE CBC W/AUTO DIFF WBC: CPT

## 2022-11-19 PROCEDURE — 83605 ASSAY OF LACTIC ACID: CPT

## 2022-11-19 PROCEDURE — 84100 ASSAY OF PHOSPHORUS: CPT

## 2022-11-19 PROCEDURE — 85651 RBC SED RATE NONAUTOMATED: CPT

## 2022-11-19 PROCEDURE — 83550 IRON BINDING TEST: CPT

## 2022-11-19 PROCEDURE — 83036 HEMOGLOBIN GLYCOSYLATED A1C: CPT

## 2022-11-19 PROCEDURE — 80076 HEPATIC FUNCTION PANEL: CPT

## 2022-11-19 PROCEDURE — 84550 ASSAY OF BLOOD/URIC ACID: CPT

## 2022-11-19 PROCEDURE — 86140 C-REACTIVE PROTEIN: CPT

## 2022-11-19 PROCEDURE — 83735 ASSAY OF MAGNESIUM: CPT

## 2022-11-19 PROCEDURE — 2060000000 HC ICU INTERMEDIATE R&B

## 2022-11-19 RX ORDER — BUDESONIDE 0.25 MG/2ML
500 INHALANT ORAL 2 TIMES DAILY
Status: DISCONTINUED | OUTPATIENT
Start: 2022-11-19 | End: 2022-11-22 | Stop reason: HOSPADM

## 2022-11-19 RX ADMIN — BENZONATATE 200 MG: 100 CAPSULE ORAL at 09:12

## 2022-11-19 RX ADMIN — BUDESONIDE 500 MCG: 0.25 SUSPENSION RESPIRATORY (INHALATION) at 19:59

## 2022-11-19 RX ADMIN — WATER 1000 MG: 1 INJECTION INTRAMUSCULAR; INTRAVENOUS; SUBCUTANEOUS at 12:10

## 2022-11-19 RX ADMIN — FERROUS SULFATE TAB 325 MG (65 MG ELEMENTAL FE) 325 MG: 325 (65 FE) TAB at 09:12

## 2022-11-19 RX ADMIN — ACETAMINOPHEN 650 MG: 325 TABLET ORAL at 06:40

## 2022-11-19 RX ADMIN — FERROUS SULFATE TAB 325 MG (65 MG ELEMENTAL FE) 325 MG: 325 (65 FE) TAB at 16:01

## 2022-11-19 RX ADMIN — ARFORMOTEROL TARTRATE 15 MCG: 15 SOLUTION RESPIRATORY (INHALATION) at 09:41

## 2022-11-19 RX ADMIN — BUDESONIDE 250 MCG: 0.25 SUSPENSION RESPIRATORY (INHALATION) at 09:41

## 2022-11-19 RX ADMIN — ENOXAPARIN SODIUM 40 MG: 100 INJECTION SUBCUTANEOUS at 15:56

## 2022-11-19 RX ADMIN — IPRATROPIUM BROMIDE AND ALBUTEROL SULFATE 1 AMPULE: 2.5; .5 SOLUTION RESPIRATORY (INHALATION) at 19:59

## 2022-11-19 RX ADMIN — Medication 1 CAPSULE: at 09:12

## 2022-11-19 RX ADMIN — BENZONATATE 200 MG: 100 CAPSULE ORAL at 22:46

## 2022-11-19 RX ADMIN — IPRATROPIUM BROMIDE AND ALBUTEROL SULFATE 1 AMPULE: 2.5; .5 SOLUTION RESPIRATORY (INHALATION) at 09:41

## 2022-11-19 RX ADMIN — METHYLPREDNISOLONE SODIUM SUCCINATE 60 MG: 125 INJECTION, POWDER, LYOPHILIZED, FOR SOLUTION INTRAMUSCULAR; INTRAVENOUS at 22:47

## 2022-11-19 RX ADMIN — ACETAMINOPHEN 650 MG: 325 TABLET ORAL at 19:02

## 2022-11-19 RX ADMIN — IPRATROPIUM BROMIDE AND ALBUTEROL SULFATE 1 AMPULE: 2.5; .5 SOLUTION RESPIRATORY (INHALATION) at 12:57

## 2022-11-19 RX ADMIN — METHYLPREDNISOLONE SODIUM SUCCINATE 60 MG: 125 INJECTION, POWDER, LYOPHILIZED, FOR SOLUTION INTRAMUSCULAR; INTRAVENOUS at 12:06

## 2022-11-19 RX ADMIN — SODIUM CHLORIDE: 9 INJECTION, SOLUTION INTRAVENOUS at 09:15

## 2022-11-19 RX ADMIN — PANTOPRAZOLE SODIUM 40 MG: 40 TABLET, DELAYED RELEASE ORAL at 06:37

## 2022-11-19 RX ADMIN — METHYLPREDNISOLONE SODIUM SUCCINATE 60 MG: 125 INJECTION, POWDER, LYOPHILIZED, FOR SOLUTION INTRAMUSCULAR; INTRAVENOUS at 05:00

## 2022-11-19 RX ADMIN — IPRATROPIUM BROMIDE AND ALBUTEROL SULFATE 1 AMPULE: 2.5; .5 SOLUTION RESPIRATORY (INHALATION) at 16:45

## 2022-11-19 RX ADMIN — ARFORMOTEROL TARTRATE 15 MCG: 15 SOLUTION RESPIRATORY (INHALATION) at 19:59

## 2022-11-19 RX ADMIN — BENZONATATE 200 MG: 100 CAPSULE ORAL at 13:25

## 2022-11-19 ASSESSMENT — PAIN DESCRIPTION - LOCATION
LOCATION: HEAD
LOCATION: HEAD

## 2022-11-19 ASSESSMENT — PAIN SCALES - GENERAL
PAINLEVEL_OUTOF10: 5
PAINLEVEL_OUTOF10: 0
PAINLEVEL_OUTOF10: 6

## 2022-11-19 ASSESSMENT — PAIN DESCRIPTION - DESCRIPTORS: DESCRIPTORS: ACHING

## 2022-11-19 NOTE — PROGRESS NOTES
PROGRESS  NOTE --                                                          INTERNAL  MEDICINE                                                                              I  PERSONALLY SAW , EXAMINED, AND CARED [address], 11/19/2022     LABS, XRAY ,CHART, AND MEDICATIONS  REVIEWED BY ME       Chief complaint:   Increasing shortness of breath      11/19/2022-SUBJECTIVE: Audie Vieira is alert awake and cooperative; oriented ×3. Denies any chest pain nausea emesis. Tolerating diet. No abdominal pain. Minimal shortness of breath with exertion. States he had productive cough with sputum production this a.m. specimen was sent. Still having left-sided throat discomfort. Afebrile last 24 hours. Pulse 92 blood pressure 101/60. SPO2 94 on 8 L nasal cannula. Intake and output not available. Ionized calcium was 1.35. Lactic acid elevated 3.0. Phosphorus low at 2.4. Procalcitonin 0.18. CRP from yesterday 0.9.  proBNP from yesterday 206. WBC today 8.8 hemoglobin 17.1. Legionella and strep antigens presumptive negative. Urine culture in process blood cultures in process. CEA normal at 3.3 ASO normal at 25.     Objective:     PHYSICAL EXAM:    VS: /60   Pulse 92   Temp 98.2 °F (36.8 °C) (Temporal)   Resp 18   Ht 6' 1\" (1.854 m)   Wt 195 lb (88.5 kg)   SpO2 94%   BMI 25.73 kg/m²     Labs:   CBC:   Lab Results   Component Value Date/Time    WBC 8.8 11/19/2022 10:31 AM    RBC 5.48 11/19/2022 10:31 AM    HGB 17.1 11/19/2022 10:31 AM    HCT 51.0 11/19/2022 10:31 AM    MCV 93.1 11/19/2022 10:31 AM    MCH 31.2 11/19/2022 10:31 AM    MCHC 33.5 11/19/2022 10:31 AM    RDW 13.7 11/19/2022 10:31 AM     11/19/2022 10:31 AM    MPV 10.1 11/19/2022 10:31 AM     CBC with Differential:    Lab Results   Component Value Date/Time    WBC 8.8 11/19/2022 10:31 AM    RBC 5.48 11/19/2022 10:31 AM    HGB 17.1 11/19/2022 10:31 AM HCT 51.0 11/19/2022 10:31 AM     11/19/2022 10:31 AM    MCV 93.1 11/19/2022 10:31 AM    MCH 31.2 11/19/2022 10:31 AM    MCHC 33.5 11/19/2022 10:31 AM    RDW 13.7 11/19/2022 10:31 AM    NRBC 0.0 11/19/2022 10:31 AM    METASPCT 0.9 04/13/2022 07:01 AM    LYMPHOPCT 0.9 11/19/2022 10:31 AM    MONOPCT 0.0 11/19/2022 10:31 AM    MYELOPCT 0.9 04/07/2021 10:35 AM    BASOPCT 0.0 11/19/2022 10:31 AM    MONOSABS 0.00 11/19/2022 10:31 AM    LYMPHSABS 0.18 11/19/2022 10:31 AM    EOSABS 0.00 11/19/2022 10:31 AM    BASOSABS 0.00 11/19/2022 10:31 AM     Hemoglobin/Hematocrit:    Lab Results   Component Value Date/Time    HGB 17.1 11/19/2022 10:31 AM    HCT 51.0 11/19/2022 10:31 AM     CMP:    Lab Results   Component Value Date/Time     11/18/2022 11:23 AM    K 4.4 11/18/2022 11:23 AM     11/18/2022 11:23 AM    CO2 24 11/18/2022 11:23 AM    BUN 6 11/18/2022 11:23 AM    CREATININE 0.8 11/18/2022 11:23 AM    GFRAA >60 08/01/2022 03:55 PM    LABGLOM >60 11/18/2022 11:23 AM    GLUCOSE 82 11/18/2022 11:23 AM    PROT 6.8 11/19/2022 10:31 AM    LABALBU 4.4 11/19/2022 10:31 AM    CALCIUM 9.7 11/18/2022 11:23 AM    BILITOT 0.5 11/19/2022 10:31 AM    ALKPHOS 72 11/19/2022 10:31 AM    AST 20 11/19/2022 10:31 AM    ALT 13 11/19/2022 10:31 AM     BMP:    Lab Results   Component Value Date/Time     11/18/2022 11:23 AM    K 4.4 11/18/2022 11:23 AM     11/18/2022 11:23 AM    CO2 24 11/18/2022 11:23 AM    BUN 6 11/18/2022 11:23 AM    LABALBU 4.4 11/19/2022 10:31 AM    CREATININE 0.8 11/18/2022 11:23 AM    CALCIUM 9.7 11/18/2022 11:23 AM    GFRAA >60 08/01/2022 03:55 PM    LABGLOM >60 11/18/2022 11:23 AM    GLUCOSE 82 11/18/2022 11:23 AM     Hepatic Function Panel:    Lab Results   Component Value Date/Time    ALKPHOS 72 11/19/2022 10:31 AM    ALT 13 11/19/2022 10:31 AM    AST 20 11/19/2022 10:31 AM    PROT 6.8 11/19/2022 10:31 AM    BILITOT 0.5 11/19/2022 10:31 AM    BILIDIR <0.2 11/19/2022 10:31 AM    IBILI see below 11/19/2022 10:31 AM    LABALBU 4.4 11/19/2022 10:31 AM     Ionized Calcium:  No results found for: IONCA  Magnesium:    Lab Results   Component Value Date/Time    MG 1.9 11/19/2022 10:31 AM     Phosphorus:    Lab Results   Component Value Date/Time    PHOS 2.4 11/19/2022 10:31 AM     LDH:    Lab Results   Component Value Date/Time     01/02/2021 05:55 PM     Uric Acid:    Lab Results   Component Value Date/Time    LABURIC 4.6 03/15/2022 03:48 AM     PT/INR:    Lab Results   Component Value Date/Time    PROTIME 12.5 05/30/2019 11:38 AM    INR 1.1 05/30/2019 11:38 AM     Warfarin PT/INR:  No components found for: PTPATWAR, PTINRWAR  PTT:    Lab Results   Component Value Date/Time    APTT 31.4 05/30/2019 11:38 AM   [APTT}  Troponin:    Lab Results   Component Value Date/Time    TROPONINI <0.01 04/08/2021 06:10 PM     Last 3 Troponin:    Lab Results   Component Value Date/Time    TROPONINI <0.01 04/08/2021 06:10 PM    TROPONINI <0.01 04/08/2021 12:50 PM    TROPONINI <0.01 01/02/2021 05:55 PM     U/A:    Lab Results   Component Value Date/Time    COLORU Yellow 11/18/2022 04:20 PM    PROTEINU Negative 11/18/2022 04:20 PM    PHUR 5.5 11/18/2022 04:20 PM    LABCAST FEW 10/02/2019 05:32 PM    WBCUA NONE 04/07/2021 10:35 AM    RBCUA NONE 04/07/2021 10:35 AM    MUCUS Present 04/07/2021 10:35 AM    BACTERIA FEW 04/07/2021 10:35 AM    CLARITYU Clear 11/18/2022 04:20 PM    SPECGRAV >=1.030 11/18/2022 04:20 PM    LEUKOCYTESUR Negative 11/18/2022 04:20 PM    UROBILINOGEN 0.2 11/18/2022 04:20 PM    BILIRUBINUR Negative 11/18/2022 04:20 PM    BLOODU Negative 11/18/2022 04:20 PM    GLUCOSEU Negative 11/18/2022 04:20 PM     HgBA1c:    Lab Results   Component Value Date/Time    LABA1C 5.3 04/11/2022 12:25 AM     FLP:    Lab Results   Component Value Date/Time    TRIG 67 11/19/2022 10:31 AM    HDL 67 11/19/2022 10:31 AM    LDLCALC 75 11/19/2022 10:31 AM    LABVLDL 13 11/19/2022 10:31 AM     TSH:    Lab Results   Component Value Date/Time    TSH 0.400 11/19/2022 10:31 AM     VITAMIN B12: No components found for: B12  FOLATE:    Lab Results   Component Value Date/Time    FOLATE 13.1 04/11/2022 12:25 AM     IRON:    Lab Results   Component Value Date/Time    IRON 61 04/11/2022 07:00 AM     Iron Saturation:  No components found for: PERCENTFE  TIBC:    Lab Results   Component Value Date/Time    TIBC 307 04/11/2022 07:00 AM     FERRITIN:    Lab Results   Component Value Date/Time    FERRITIN 144 04/11/2022 07:00 AM        General appearance: Alert, Awake, Oriented times 3, no distress; nasal cannula oxygen in place  Skin: Warm and dry ; no rashes  Head: Normocephalic. No masses, lesions or tenderness noted  Eyes: Conjunctivae pink, sclera white. PERRL,EOM-I  Ears: External ears normal  Nose/Sinuses: Nares normal. Septum midline. Mucosa normal. No drainage  Oropharynx: Oropharynx clear with no exudate seen  Neck: Supple. No jugular venous distension, lymphadenopathy or thyromegaly Trachea midline  Lungs: Soft wheeze throughout rare rhonchi anteriorly  Heart: S1 S2  Regular rate and rhythm. No rub, gallop, murmur  Abdomen: Soft, non-tender. BS normal. No masses, organomegaly; no rebound or guarding  Extremities: No edema, Peripheral pulses palpable  Musculoskeletal: Muscular strength appears intact. Neuro:  No focal motor defects ; II-XII grossly intact .  GLASER equally    TELEMETRY: REVIEWED--Telemetry: Sinus    ASSESSMENT:   Principal Problem:    COPD exacerbation (HCC)  Active Problems:    Acute respiratory failure with hypoxia (HCC)    RSV (acute bronchiolitis due to respiratory syncytial virus)    COPD (chronic obstructive pulmonary disease) (HCC)    Bullous emphysema (HCC)    Thoracic ascending aortic aneurysm    Glucose intolerance    Pulmonary Mycobacterium avium complex (MAC) infection (HCC)    Chronic respiratory failure with hypoxia (HCC)    Chronic pain syndrome    Immunocompromised (HCC)    Iron deficiency  Resolved Problems:    * No resolved hospital problems. *      PLAN:  SEE ORDERS      RE  CHANGES AND FINDINGS   Medications reviewed with patient  GI prophylaxis  DVT prophylaxis  Pulmonary has been consulted  Restart ceftriaxone 1 g every 24 hours in view of elevated procalcitonin  Arformoterol 15 mcg twice daily  Azithromycin 500 mg by mouth every other day, MAC  Tessalon 200 mg 3 times daily  Budesonide 500 mcg twice daily  DuoNeb every 4 hours  Enoxaparin 40 mg daily  Methylprednisolone 60 mg IV every 6 hours  Protonix 40 mg daily  Guaifenesin with codeine 5 cc every 4 hours as needed for cough  Percocet 5, 1 tablet twice daily as needed for pain  Potassium replacement protocol      Discussed with patient and nursing. Supa Jaimes DO     11:30 AM     11/19/2022    TIME > 35 MINUTES    >  50 %  OF  TIME  DISCUSSION               ------------  INFORMATION  -----------      DIET:ADULT DIET; Regular      No Known Allergies      MEDICATION SIDE EFFECTS:none       SCHEDULED MEDS:                                 Scheduled Meds:   ferrous sulfate  325 mg Oral BID WC    pantoprazole  40 mg Oral QAM AC    Arformoterol Tartrate  15 mcg Nebulization BID    benzonatate  200 mg Oral TID    budesonide  250 mcg Nebulization BID    methylPREDNISolone  60 mg IntraVENous Q8H    enoxaparin  40 mg SubCUTAneous Daily    [START ON 11/20/2022] azithromycin  500 mg Oral Every Other Day    lactobacillus  1 capsule Oral Daily    ipratropium-albuterol  1 ampule Inhalation Q4H WA       Continuous Infusions:   sodium chloride 50 mL/hr at 11/19/22 0915         Data:     No intake or output data in the 24 hours ending 11/19/22 1130    Wt Readings from Last 3 Encounters:   11/18/22 195 lb (88.5 kg)   11/17/22 195 lb (88.5 kg)   10/27/22 192 lb (87.1 kg)       Labs: Additional    GLUCOSE:No results for input(s): POCGLU in the last 72 hours.     BNP:No results found for: BNP    CRP:   Recent Labs     11/18/22  1700   CRP 0.9*       ESR:  Recent Labs 11/18/22  1700   SEDRATE 0       RADIOLOGY: REVIEWED AVAILABLE REPORT  XR CHEST PORTABLE   Final Result   Unchanged emphysema, fibrosis, likely pulmonary arterial hypertension and   right-sided pleural thickening.                    [address],     11:30 AM     11/19/2022      Voice recognition software used for dictation

## 2022-11-19 NOTE — CONSULTS
[facility]  [address] [facility], [address]  [facility], [address] [facility]    Pulmonary Consultation      Reason for Consult:  sob    Requesting Physician:  Princess Jasmine MD    CHIEF COMPLAINT:  sob    History Obtained From:  patient    HISTORY OF PRESENT ILLNESS:                The patient is a 81 y.o. male with significant past medical history of MAC and COPD who presents with increased sore throat and sob. Developed sore throat about 4 days ago, then developed sob about 2 days ago, (?) fever on day of admission, decided to come to hospital.  Suppose to be on oxygen 4 li continuously but wearing at night and occasionally with activity, using pulmicort and perforomist nebs bid and prn with duonebs, on chronic zithromax 500 mg tiw for MAC. Currently on 8 li HFNC saturating 92%, feeling some better with breathing though still similar with sore throat, reclining in bed looking comfortable with respiratory function.     Past Medical History:        Diagnosis Date    Acute and chronic respiratory failure with hypoxia ([address] 10/12/2017    Acute heart failure with preserved ejection fraction ([address] 4/8/2021    Acute respiratory disease due to severe acute respiratory syndrome coronavirus 2 (SARS-CoV-2) 01/01/2021    Acute respiratory failure with hypoxia ([address] 11/12/2018    Bullous emphysema ([address] 11/12/2018    Chronic back pain     Degeneration of umbar intervertebral disc    Colon cancer screening     COPD (chronic obstructive pulmonary disease) ([address]     Coronavirus infection 2/2/2022    Coronavirus 229E    Cough with hemoptysis 04/23/2019    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 ([address] 08/15/2019    First seen by ID; treatment started 9/4/2019    Emphysema lung ([address]     Glucose intolerance 06/10/2019    Hilar adenopathy 06/10/2019    Hypophosphatemia 06/10/2019    Immunocompromised ([address] 2/3/2022    Infection due to parainfluenza virus 3 06/10/2019    Iron deficiency 2/3/2022    Left upper lobe pneumonia 10/12/2017    Pleural effusion on right 10/03/2019    Pulmonary emphysema with fibrosis of lung ([facility] 11/15/2018    Pulmonary Mycobacterium avium complex (MAC) infection ([facility] 07/12/2019    BAL results finally completed this date    Rhinovirus infection 10/16/2020    Right lower lobe pneumonia 11/15/2018    Recurrent 4/23/19    RSV (acute bronchiolitis due to respiratory syncytial virus) 11/18/2022    S/P lumbar fusion 06/01/2019    Thoracic ascending aortic aneurysm 11/15/2018    Proximal ascending aorta; 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 [address] N/A 10/07/2019    BRONCHOSCOPY DIAGNOSTIC OR CELL [address] ONLY performed by Nai Donovan MD at [address] [place] [address] N/A 04/12/2021    BRONCHOSCOPY performed by Nai Donovan MD at [address] [place] [address]  11/18/2013    HC INSERT PICC CATH, 5/> YRS  04/10/2021         LUMBAR FUSION  03/2019    [facility]       Current Medications:    Current Facility-Administered Medications: budesonide (PULMICORT) nebulizer suspension 500 mcg, 500 mcg, Nebulization, BID  cefTRIAXone (ROCEPHIN) 1,000 mg in sterile water 10 mL IV syringe, 1,000 mg, IntraVENous, Q24H  ferrous sulfate (IRON 325) tablet 325 mg, 325 mg, Oral, BID WC  pantoprazole (PROTONIX) tablet 40 mg, 40 mg, Oral, QAM AC  0.9 % sodium chloride infusion, , IntraVENous, Continuous  Arformoterol Tartrate (BROVANA) nebulizer solution 15 mcg, 15 mcg, Nebulization, BID  benzonatate (TESSALON) capsule 200 mg, 200 mg, Oral, TID  methylPREDNISolone sodium (SOLU-MEDROL) injection 60 mg, 60 mg, IntraVENous, Q8H  enoxaparin (LOVENOX) injection 40 mg, 40 mg, SubCUTAneous, Daily  guaiFENesin-codeine (GUAIFENESIN AC) 100-10 MG/5ML liquid 5 mL, 5 mL, Oral, Q4H PRN  potassium chloride (KLOR-CON M) extended release tablet 40 mEq, 40 mEq, Oral, PRN **OR** potassium bicarb-citric acid (EFFER-K) effervescent tablet 40 mEq, 40 mEq, Oral, PRN **OR** potassium chloride 10 mEq/100 mL IVPB (Peripheral Line), 10 mEq, IntraVENous, PRN  acetaminophen (TYLENOL) tablet 650 mg, 650 mg, Oral, Q4H PRN  [START ON 2022] azithromycin (ZITHROMAX) tablet 500 mg, 500 mg, Oral, Every Other Day  oxyCODONE-acetaminophen (PERCOCET) 5-325 MG per tablet 1 tablet, 1 tablet, Oral, BID PRN **AND** oxyCODONE (ROXICODONE) immediate release tablet 2.5 mg, 2.5 mg, Oral, BID PRN  lactobacillus (CULTURELLE) capsule 1 capsule, 1 capsule, Oral, Daily  ipratropium-albuterol (DUONEB) nebulizer solution 1 ampule, 1 ampule, Inhalation, Q4H WA  phenol 1.4 % mouth spray 1 spray, 1 spray, Mouth/Throat, Q2H PRN    Allergies:  Patient has no known allergies. Social History:    TOBACCO:   reports that he quit smoking about 8 years ago. His smoking use included cigarettes. He started smoking about 54 years ago. He has a 92.00 pack-year smoking history. He has never used smokeless tobacco.    Family History:       Problem Relation Age of Onset    Other Mother          age 80    Other Father          age 80       REVIEW OF SYSTEMS:    RESPIRATORY:  sob and sore throat  Remainder of complete ROS is negative. PHYSICAL EXAM:      Vitals:    /60   Pulse 92   Temp 98.2 °F (36.8 °C) (Temporal)   Resp 18   Ht 6' 1\" (1.854 m)   Wt 195 lb (88.5 kg)   SpO2 94%   BMI 25.73 kg/m²     EYES:  Lids and lashes normal, pupils equal, round and reactive to light, extra ocular muscles intact, sclera clear, conjunctiva normal  ENT:  Normocephalic, without obvious abnormality, atraumatic, sinuses nontender on palpation, external ears without lesions, oral pharynx with moist mucus membranes, tonsils without erythema or exudates, gums normal and good dentition.   LUNGS:  bilateral ronchi with cough  CARDIOVASCULAR:  Normal apical impulse, regular rate and rhythm, normal S1 and S2, no S3 or S4, and no murmur noted  ABDOMEN:  No scars, normal bowel sounds, soft, non-distended, non-tender, no masses palpated, no hepatosplenomegally  MUSCULOSKELETAL:  There is no redness, warmth, or swelling of the joints. Full range of motion noted. NEUROLOGIC:  Awake, alert, oriented to name, place and time. Cranial nerves II-XII are grossly intact. DATA:    CBC:   Recent Labs     11/18/22  1123 11/19/22  1031   WBC 9.4 8.8   HGB 17.0* 17.1*   HCT 50.6 51.0   MCV 92.7 93.1    157       BMP:  Recent Labs     11/18/22  1123 11/19/22  1031     --    K 4.4  --      --    CO2 24  --    PHOS  --  2.4*   BUN 6  --    CREATININE 0.8  --     ALB:3,BILIDIR:3,BILITOT:3,ALKPHOS:3)@    PT/INR: No results for input(s): PROTIME, INR in the last 72 hours. ABG:   No results for input(s): PH, PO2, PCO2, HCO3, BE, O2SAT, METHB, O2HB, COHB, O2CON, HHB, THB in the last 72 hours. Radiology Review:  CXR reviewed looking similar with increased interstitial markings both lower lung fields and right lower lung field pleural thickening/fibrosis compared to previous    IMPRESSION/RECOMMENDATIONS:      RSV (+)  Hypoxia  COPD  MAC    Cont with steroids, taper as tolerated, hopefully can decrease frequency if continues to improve quickly  Cont with nebs, observe respiratory function  Cont with oxygen, taper as tolerated  Cont with zithromax tiw for chronic MAC treatment  OOB to chair as tolerated      Time at the bedside, reviewing labs and radiographs, reviewing notes and consultations, discussing with staff and family was more than 55 minutes. Thanks for letting us see this patient in consultation. Please contact us with any questions. Office [phone] or after hours through [employer], x [phone].

## 2022-11-20 LAB
ALBUMIN SERPL-MCNC: 3.7 G/DL (ref 3.5–5.2)
ALP BLD-CCNC: 61 U/L (ref 40–129)
ALT SERPL-CCNC: 12 U/L (ref 0–40)
ANION GAP SERPL CALCULATED.3IONS-SCNC: 12 MMOL/L (ref 7–16)
AST SERPL-CCNC: 18 U/L (ref 0–39)
BASOPHILS ABSOLUTE: 0.01 E9/L (ref 0–0.2)
BASOPHILS RELATIVE PERCENT: 0.1 % (ref 0–2)
BILIRUB SERPL-MCNC: 0.3 MG/DL (ref 0–1.2)
BILIRUBIN DIRECT: <0.2 MG/DL (ref 0–0.3)
BILIRUBIN, INDIRECT: ABNORMAL MG/DL (ref 0–1)
BUN BLDV-MCNC: 13 MG/DL (ref 6–23)
C-REACTIVE PROTEIN: 1.4 MG/DL (ref 0–0.4)
CALCIUM SERPL-MCNC: 9.3 MG/DL (ref 8.6–10.2)
CHLORIDE BLD-SCNC: 102 MMOL/L (ref 98–107)
CO2: 23 MMOL/L (ref 22–29)
CREAT SERPL-MCNC: 0.6 MG/DL (ref 0.7–1.2)
EOSINOPHILS ABSOLUTE: 0 E9/L (ref 0.05–0.5)
EOSINOPHILS RELATIVE PERCENT: 0 % (ref 0–6)
GFR SERPL CREATININE-BSD FRML MDRD: >60 ML/MIN/1.73
GLUCOSE BLD-MCNC: 137 MG/DL (ref 74–99)
HCT VFR BLD CALC: 46.9 % (ref 37–54)
HEMOGLOBIN: 15.5 G/DL (ref 12.5–16.5)
IMMATURE GRANULOCYTES #: 0.04 E9/L
IMMATURE GRANULOCYTES %: 0.4 % (ref 0–5)
LACTIC ACID, SEPSIS: 2.2 MMOL/L (ref 0.5–1.9)
LYMPHOCYTES ABSOLUTE: 0.19 E9/L (ref 1.5–4)
LYMPHOCYTES RELATIVE PERCENT: 2.1 % (ref 20–42)
MAGNESIUM: 1.9 MG/DL (ref 1.6–2.6)
MCH RBC QN AUTO: 30.2 PG (ref 26–35)
MCHC RBC AUTO-ENTMCNC: 33 % (ref 32–34.5)
MCV RBC AUTO: 91.4 FL (ref 80–99.9)
MONOCYTES ABSOLUTE: 0.23 E9/L (ref 0.1–0.95)
MONOCYTES RELATIVE PERCENT: 2.6 % (ref 2–12)
NEUTROPHILS ABSOLUTE: 8.47 E9/L (ref 1.8–7.3)
NEUTROPHILS RELATIVE PERCENT: 94.8 % (ref 43–80)
PDW BLD-RTO: 13.7 FL (ref 11.5–15)
PHOSPHORUS: 2.7 MG/DL (ref 2.5–4.5)
PLATELET # BLD: 150 E9/L (ref 130–450)
PMV BLD AUTO: 10.4 FL (ref 7–12)
POLYCHROMASIA: ABNORMAL
POTASSIUM SERPL-SCNC: 3.8 MMOL/L (ref 3.5–5)
PRO-BNP: 436 PG/ML (ref 0–125)
RBC # BLD: 5.13 E12/L (ref 3.8–5.8)
SEDIMENTATION RATE, ERYTHROCYTE: 0 MM/HR (ref 0–15)
SODIUM BLD-SCNC: 137 MMOL/L (ref 132–146)
TOTAL PROTEIN: 5.9 G/DL (ref 6.4–8.3)
WBC # BLD: 8.9 E9/L (ref 4.5–11.5)

## 2022-11-20 PROCEDURE — 85025 COMPLETE CBC W/AUTO DIFF WBC: CPT

## 2022-11-20 PROCEDURE — 36415 COLL VENOUS BLD VENIPUNCTURE: CPT

## 2022-11-20 PROCEDURE — 6360000002 HC RX W HCPCS: Performed by: INTERNAL MEDICINE

## 2022-11-20 PROCEDURE — 86140 C-REACTIVE PROTEIN: CPT

## 2022-11-20 PROCEDURE — 85651 RBC SED RATE NONAUTOMATED: CPT

## 2022-11-20 PROCEDURE — 2060000000 HC ICU INTERMEDIATE R&B

## 2022-11-20 PROCEDURE — 6370000000 HC RX 637 (ALT 250 FOR IP): Performed by: INTERNAL MEDICINE

## 2022-11-20 PROCEDURE — 94640 AIRWAY INHALATION TREATMENT: CPT

## 2022-11-20 PROCEDURE — 84100 ASSAY OF PHOSPHORUS: CPT

## 2022-11-20 PROCEDURE — 83735 ASSAY OF MAGNESIUM: CPT

## 2022-11-20 PROCEDURE — 83605 ASSAY OF LACTIC ACID: CPT

## 2022-11-20 PROCEDURE — 83880 ASSAY OF NATRIURETIC PEPTIDE: CPT

## 2022-11-20 PROCEDURE — 80048 BASIC METABOLIC PNL TOTAL CA: CPT

## 2022-11-20 PROCEDURE — 2580000003 HC RX 258: Performed by: INTERNAL MEDICINE

## 2022-11-20 PROCEDURE — 80076 HEPATIC FUNCTION PANEL: CPT

## 2022-11-20 RX ORDER — METHYLPREDNISOLONE SODIUM SUCCINATE 40 MG/ML
40 INJECTION, POWDER, LYOPHILIZED, FOR SOLUTION INTRAMUSCULAR; INTRAVENOUS EVERY 8 HOURS
Status: DISCONTINUED | OUTPATIENT
Start: 2022-11-20 | End: 2022-11-21

## 2022-11-20 RX ADMIN — BUDESONIDE 500 MCG: 0.25 SUSPENSION RESPIRATORY (INHALATION) at 20:24

## 2022-11-20 RX ADMIN — AZITHROMYCIN 500 MG: 250 TABLET, FILM COATED ORAL at 08:19

## 2022-11-20 RX ADMIN — FERROUS SULFATE TAB 325 MG (65 MG ELEMENTAL FE) 325 MG: 325 (65 FE) TAB at 08:20

## 2022-11-20 RX ADMIN — NYSTATIN 500000 UNITS: 500000 SUSPENSION ORAL at 21:27

## 2022-11-20 RX ADMIN — SODIUM CHLORIDE: 9 INJECTION, SOLUTION INTRAVENOUS at 17:52

## 2022-11-20 RX ADMIN — ARFORMOTEROL TARTRATE 15 MCG: 15 SOLUTION RESPIRATORY (INHALATION) at 09:58

## 2022-11-20 RX ADMIN — IPRATROPIUM BROMIDE AND ALBUTEROL SULFATE 1 AMPULE: 2.5; .5 SOLUTION RESPIRATORY (INHALATION) at 13:12

## 2022-11-20 RX ADMIN — WATER 1000 MG: 1 INJECTION INTRAMUSCULAR; INTRAVENOUS; SUBCUTANEOUS at 12:09

## 2022-11-20 RX ADMIN — IPRATROPIUM BROMIDE AND ALBUTEROL SULFATE 1 AMPULE: 2.5; .5 SOLUTION RESPIRATORY (INHALATION) at 17:17

## 2022-11-20 RX ADMIN — METHYLPREDNISOLONE SODIUM SUCCINATE 60 MG: 125 INJECTION, POWDER, LYOPHILIZED, FOR SOLUTION INTRAMUSCULAR; INTRAVENOUS at 05:08

## 2022-11-20 RX ADMIN — GUAIFENESIN AND CODEINE PHOSPHATE 5 ML: 10; 100 LIQUID ORAL at 13:40

## 2022-11-20 RX ADMIN — NYSTATIN 500000 UNITS: 500000 SUSPENSION ORAL at 16:28

## 2022-11-20 RX ADMIN — BENZONATATE 200 MG: 100 CAPSULE ORAL at 08:20

## 2022-11-20 RX ADMIN — BENZONATATE 200 MG: 100 CAPSULE ORAL at 21:22

## 2022-11-20 RX ADMIN — FERROUS SULFATE TAB 325 MG (65 MG ELEMENTAL FE) 325 MG: 325 (65 FE) TAB at 16:27

## 2022-11-20 RX ADMIN — METHYLPREDNISOLONE SODIUM SUCCINATE 60 MG: 125 INJECTION, POWDER, LYOPHILIZED, FOR SOLUTION INTRAMUSCULAR; INTRAVENOUS at 12:09

## 2022-11-20 RX ADMIN — Medication 1 CAPSULE: at 08:19

## 2022-11-20 RX ADMIN — IPRATROPIUM BROMIDE AND ALBUTEROL SULFATE 1 AMPULE: 2.5; .5 SOLUTION RESPIRATORY (INHALATION) at 09:58

## 2022-11-20 RX ADMIN — ARFORMOTEROL TARTRATE 15 MCG: 15 SOLUTION RESPIRATORY (INHALATION) at 20:24

## 2022-11-20 RX ADMIN — METHYLPREDNISOLONE SODIUM SUCCINATE 40 MG: 40 INJECTION, POWDER, LYOPHILIZED, FOR SOLUTION INTRAMUSCULAR; INTRAVENOUS at 21:22

## 2022-11-20 RX ADMIN — SODIUM CHLORIDE: 9 INJECTION, SOLUTION INTRAVENOUS at 05:08

## 2022-11-20 RX ADMIN — ENOXAPARIN SODIUM 40 MG: 100 INJECTION SUBCUTANEOUS at 16:27

## 2022-11-20 RX ADMIN — PANTOPRAZOLE SODIUM 40 MG: 40 TABLET, DELAYED RELEASE ORAL at 05:08

## 2022-11-20 RX ADMIN — BUDESONIDE 500 MCG: 0.25 SUSPENSION RESPIRATORY (INHALATION) at 09:58

## 2022-11-20 RX ADMIN — IPRATROPIUM BROMIDE AND ALBUTEROL SULFATE 1 AMPULE: 2.5; .5 SOLUTION RESPIRATORY (INHALATION) at 20:24

## 2022-11-20 RX ADMIN — BENZONATATE 200 MG: 100 CAPSULE ORAL at 13:40

## 2022-11-20 ASSESSMENT — PAIN SCALES - GENERAL
PAINLEVEL_OUTOF10: 0
PAINLEVEL_OUTOF10: 0

## 2022-11-20 NOTE — PROGRESS NOTES
[facility]  [address], [address]  [facility], [address] [employer] [employer]      Pulmonary Progress Note      SUBJECTIVE:  claims feeling some better with breathing. On 8 li HFNC and saturating about 91%, sitting up in bed, slightly less with cough. Mentions ambulating a little in room, limited due to length of oxygen tube, tolerating a bit better.     OBJECTIVE    Medications    Continuous Infusions:   sodium chloride 50 mL/hr at 22 0508       Scheduled Meds:   nystatin  5 mL Oral 4x Daily    budesonide  500 mcg Nebulization BID    cefTRIAXone (ROCEPHIN) IV  1,000 mg IntraVENous Q24H    ferrous sulfate  325 mg Oral BID WC    pantoprazole  40 mg Oral QAM AC    Arformoterol Tartrate  15 mcg Nebulization BID    benzonatate  200 mg Oral TID    methylPREDNISolone  60 mg IntraVENous Q8H    enoxaparin  40 mg SubCUTAneous Daily    azithromycin  500 mg Oral Every Other Day    lactobacillus  1 capsule Oral Daily    ipratropium-albuterol  1 ampule Inhalation Q4H WA       PRN Meds:magic (miracle) mouthwash, guaiFENesin-codeine, potassium chloride **OR** potassium alternative oral replacement **OR** potassium chloride, acetaminophen, oxyCODONE-acetaminophen **AND** oxyCODONE, phenol    Physical    VITALS:  /77   Pulse 100   Temp 98.6 °F (37 °C) (Temporal)   Resp 18   Ht 6' 1\" (1.854 m)   Wt 193 lb 5 oz (87.7 kg)   SpO2 94%   BMI 25.50 kg/m²     24HR INTAKE/OUTPUT:      Intake/Output Summary (Last 24 hours) at 2022 1427  Last data filed at 2022 0815  Gross per 24 hour   Intake 360 ml   Output --   Net 360 ml       24HR PULSE OXIMETRY RANGE:    SpO2  Av.4 %  Min: 93 %  Max: 94 %    General appearance: alert, appears stated age, and cooperative  Lungs: rhonchi bibasilar  Heart: regular rate and rhythm, S1, S2 normal, no murmur, click, rub or gallop  Abdomen: soft, non-tender; bowel sounds normal; no masses,  no organomegaly  Extremities: extremities normal, atraumatic, no cyanosis or edema  Neurologic: Mental status: Alert, oriented, thought content appropriate    Data    CBC:   Recent Labs     11/18/22  1123 11/19/22  1031 11/20/22  0520   WBC 9.4 8.8 8.9   HGB 17.0* 17.1* 15.5   HCT 50.6 51.0 46.9   MCV 92.7 93.1 91.4    157 150       BMP:  Recent Labs     11/18/22  1123 11/19/22  1031 11/20/22  0520 11/20/22  1335     --   --  137   K 4.4  --   --  3.8     --   --  102   CO2 24  --   --  23   PHOS  --  2.4* 2.7  --    BUN 6  --   --  13   CREATININE 0.8  --   --  0.6*    ALB:3,BILIDIR:3,BILITOT:3,ALKPHOS:3)@    PT/INR: No results for input(s): PROTIME, INR in the last 72 hours. ABG:   No results for input(s): PH, PO2, PCO2, HCO3, BE, O2SAT, METHB, O2HB, COHB, O2CON, HHB, THB in the last 72 hours. 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  *      Plan:       Cont with steroids, taper as tolerated  Cont with nebs, observe respiratory function  Cont with oxygen, taper as tolerated  Incentive spirometer and see if helps  Cont with zithomax tiw for MAC  OOB to chair as tolerated      Time at the bedside, reviewing labs and radiographs, reviewing notes and consultations, discussing with staff and family was more than 35 minutes. Thanks for letting us see this patient in consultation. Please contact us with any questions. Office [phone] or after hours through [employer], x [phone].

## 2022-11-20 NOTE — PROGRESS NOTES
PROGRESS  NOTE --                                                          INTERNAL  MEDICINE                                                                              I  PERSONALLY SAW , EXAMINED, AND CARED [address], 11/20/2022     LABS, XRAY ,CHART, AND MEDICATIONS  REVIEWED BY ME       Chief complaint:   Increasing shortness of breath      11/19/2022-SUBJECTIVE: Merrick Ron is alert awake and cooperative; oriented ×3. Denies any chest pain nausea emesis. Tolerating diet. No abdominal pain. Minimal shortness of breath with exertion. States he had productive cough with sputum production this a.m. specimen was sent. Still having left-sided throat discomfort. Afebrile last 24 hours. Pulse 92 blood pressure 101/60. SPO2 94 on 8 L nasal cannula. Intake and output not available. Ionized calcium was 1.35. Lactic acid elevated 3.0. Phosphorus low at 2.4. Procalcitonin 0.18. CRP from yesterday 0.9.  proBNP from yesterday 206. WBC today 8.8 hemoglobin 17.1. Legionella and strep antigens presumptive negative. Urine culture in process blood cultures in process. CEA normal at 3.3 ASO normal at 25.      11/20/2022-patient sitting up in bed; states he is feeling slightly better. Has been up to the bathroom without great difficulty. No chest pain minimal shortness of breath. Currently on 8 L nasal cannula high flow. Numerous questions regarding his wife who is sick at home likely with RSV also and he would like my recommendations. She had temperature slightly over 100; no shortness of breath no other significant symptoms; I suggest symptomatic treatment only for her. He states she has no significant health problems. He is still having some pain left side of his throat. Highest temperature last 24 hours 99.1, currently 98. 6. Respirations 18 pulse 95 blood pressure 134/77. SPO2 93 on 8 L nasal cannula high flow. Intake and output +360 cc. BNP was apparently not drawn today. Lactic acid improving at 2.2. Protein 5.9 magnesium 1.9. CRP improving, 1.4 still elevated. Hepatic function panel normal.  WBC 8.9 hemoglobin 15.5. Iron low at 51 TIBC low at 15. Ferritin normal at 151 TIBC normal 035 N63 folic acid normal.  ESR 2. Urine culture growth not present incubation continues blood cultures negative to date. Respiratory culture pending. Legionella strep antigens presumptive negative. Pulmonary consult from yesterday appreciated. Continue steroids taper as tolerated continue azithromycin for chronic MAC. Continue nebulizers.         Objective:     PHYSICAL EXAM:    VS: /77   Pulse 95   Temp 98.6 °F (37 °C) (Temporal)   Resp 18   Ht 6' 1\" (1.854 m)   Wt 193 lb 5 oz (87.7 kg)   SpO2 93%   BMI 25.50 kg/m²     Labs:   CBC:   Lab Results   Component Value Date/Time    WBC 8.9 11/20/2022 05:20 AM    RBC 5.13 11/20/2022 05:20 AM    HGB 15.5 11/20/2022 05:20 AM    HCT 46.9 11/20/2022 05:20 AM    MCV 91.4 11/20/2022 05:20 AM    MCH 30.2 11/20/2022 05:20 AM    MCHC 33.0 11/20/2022 05:20 AM    RDW 13.7 11/20/2022 05:20 AM     11/20/2022 05:20 AM    MPV 10.4 11/20/2022 05:20 AM     CBC with Differential:    Lab Results   Component Value Date/Time    WBC 8.9 11/20/2022 05:20 AM    RBC 5.13 11/20/2022 05:20 AM    HGB 15.5 11/20/2022 05:20 AM    HCT 46.9 11/20/2022 05:20 AM     11/20/2022 05:20 AM    MCV 91.4 11/20/2022 05:20 AM    MCH 30.2 11/20/2022 05:20 AM    MCHC 33.0 11/20/2022 05:20 AM    RDW 13.7 11/20/2022 05:20 AM    NRBC 0.0 11/19/2022 10:31 AM    METASPCT 0.9 04/13/2022 07:01 AM    LYMPHOPCT 2.1 11/20/2022 05:20 AM    MONOPCT 2.6 11/20/2022 05:20 AM    MYELOPCT 0.9 04/07/2021 10:35 AM    BASOPCT 0.1 11/20/2022 05:20 AM    MONOSABS 0.23 11/20/2022 05:20 AM    LYMPHSABS 0.19 11/20/2022 05:20 AM    EOSABS 0.00 11/20/2022 05:20 AM    BASOSABS 0.01 11/20/2022 05:20 AM     Hemoglobin/Hematocrit: Lab Results   Component Value Date/Time    HGB 15.5 11/20/2022 05:20 AM    HCT 46.9 11/20/2022 05:20 AM     CMP:    Lab Results   Component Value Date/Time     11/18/2022 11:23 AM    K 4.4 11/18/2022 11:23 AM     11/18/2022 11:23 AM    CO2 24 11/18/2022 11:23 AM    BUN 6 11/18/2022 11:23 AM    CREATININE 0.8 11/18/2022 11:23 AM    GFRAA >60 08/01/2022 03:55 PM    LABGLOM >60 11/18/2022 11:23 AM    GLUCOSE 82 11/18/2022 11:23 AM    PROT 5.9 11/20/2022 05:20 AM    LABALBU 3.7 11/20/2022 05:20 AM    CALCIUM 9.7 11/18/2022 11:23 AM    BILITOT 0.3 11/20/2022 05:20 AM    ALKPHOS 61 11/20/2022 05:20 AM    AST 18 11/20/2022 05:20 AM    ALT 12 11/20/2022 05:20 AM     BMP:    Lab Results   Component Value Date/Time     11/18/2022 11:23 AM    K 4.4 11/18/2022 11:23 AM     11/18/2022 11:23 AM    CO2 24 11/18/2022 11:23 AM    BUN 6 11/18/2022 11:23 AM    LABALBU 3.7 11/20/2022 05:20 AM    CREATININE 0.8 11/18/2022 11:23 AM    CALCIUM 9.7 11/18/2022 11:23 AM    GFRAA >60 08/01/2022 03:55 PM    LABGLOM >60 11/18/2022 11:23 AM    GLUCOSE 82 11/18/2022 11:23 AM     Hepatic Function Panel:    Lab Results   Component Value Date/Time    ALKPHOS 61 11/20/2022 05:20 AM    ALT 12 11/20/2022 05:20 AM    AST 18 11/20/2022 05:20 AM    PROT 5.9 11/20/2022 05:20 AM    BILITOT 0.3 11/20/2022 05:20 AM    BILIDIR <0.2 11/20/2022 05:20 AM    IBILI see below 11/20/2022 05:20 AM    LABALBU 3.7 11/20/2022 05:20 AM     Ionized Calcium:  No results found for: IONCA  Magnesium:    Lab Results   Component Value Date/Time    MG 1.9 11/20/2022 05:20 AM     Phosphorus:    Lab Results   Component Value Date/Time    PHOS 2.7 11/20/2022 05:20 AM     LDH:    Lab Results   Component Value Date/Time     01/02/2021 05:55 PM     Uric Acid:    Lab Results   Component Value Date/Time    LABURIC 5.6 11/19/2022 10:31 AM     PT/INR:    Lab Results   Component Value Date/Time    PROTIME 12.5 05/30/2019 11:38 AM    INR 1.1 05/30/2019 11:38 AM     Warfarin PT/INR:  No components found for: PTPATWAR, PTINRWAR  PTT:    Lab Results   Component Value Date/Time    APTT 31.4 05/30/2019 11:38 AM   [APTT}  Troponin:    Lab Results   Component Value Date/Time    TROPONINI <0.01 04/08/2021 06:10 PM     Last 3 Troponin:    Lab Results   Component Value Date/Time    TROPONINI <0.01 04/08/2021 06:10 PM    TROPONINI <0.01 04/08/2021 12:50 PM    TROPONINI <0.01 01/02/2021 05:55 PM     U/A:    Lab Results   Component Value Date/Time    COLORU Yellow 11/18/2022 04:20 PM    PROTEINU Negative 11/18/2022 04:20 PM    PHUR 5.5 11/18/2022 04:20 PM    LABCAST FEW 10/02/2019 05:32 PM    WBCUA NONE 04/07/2021 10:35 AM    RBCUA NONE 04/07/2021 10:35 AM    MUCUS Present 04/07/2021 10:35 AM    BACTERIA FEW 04/07/2021 10:35 AM    CLARITYU Clear 11/18/2022 04:20 PM    SPECGRAV >=1.030 11/18/2022 04:20 PM    LEUKOCYTESUR Negative 11/18/2022 04:20 PM    UROBILINOGEN 0.2 11/18/2022 04:20 PM    BILIRUBINUR Negative 11/18/2022 04:20 PM    BLOODU Negative 11/18/2022 04:20 PM    GLUCOSEU Negative 11/18/2022 04:20 PM     HgBA1c:    Lab Results   Component Value Date/Time    LABA1C 5.3 11/19/2022 10:31 AM     FLP:    Lab Results   Component Value Date/Time    TRIG 67 11/19/2022 10:31 AM    HDL 67 11/19/2022 10:31 AM    LDLCALC 75 11/19/2022 10:31 AM    LABVLDL 13 11/19/2022 10:31 AM     TSH:    Lab Results   Component Value Date/Time    TSH 0.400 11/19/2022 10:31 AM     VITAMIN B12: No components found for: B12  FOLATE:    Lab Results   Component Value Date/Time    FOLATE 8.8 11/19/2022 10:31 AM     IRON:    Lab Results   Component Value Date/Time    IRON 51 11/19/2022 10:31 AM     Iron Saturation:  No components found for: PERCENTFE  TIBC:    Lab Results   Component Value Date/Time    TIBC 337 11/19/2022 10:31 AM     FERRITIN:    Lab Results   Component Value Date/Time    FERRITIN 151 11/19/2022 10:31 AM        General appearance: Alert, Awake, Oriented times 3, no distress; nasal cannula oxygen in place  Skin: Warm and dry ; no rashes  Head: Normocephalic. No masses, lesions or tenderness noted  Eyes: Conjunctivae pink, sclera white. PERRL,EOM-I  Ears: External ears normal  Nose/Sinuses: Nares normal. Septum midline. Mucosa normal. No drainage  Oropharynx: Oropharynx clear, I cannot see any Candida lesions  Neck: Supple. No jugular venous distension, lymphadenopathy or thyromegaly Trachea midline; mildly tender to palpation left side of neck  Lungs: Soft wheeze throughout rare rhonchi anteriorly  Heart: S1 S2  Regular rate and rhythm. No rub, gallop, murmur  Abdomen: Soft, non-tender. BS normal. No masses, organomegaly; no rebound or guarding  Extremities: No edema, Peripheral pulses palpable  Musculoskeletal: Muscular strength appears intact. Neuro:  No focal motor defects ; II-XII grossly intact . GLASER equally    TELEMETRY: REVIEWED--Telemetry: Sinus    ASSESSMENT:   Principal Problem:    COPD exacerbation (HCC)  Active Problems:    Acute respiratory failure with hypoxia (HCC)    RSV (acute bronchiolitis due to respiratory syncytial virus)    COPD (chronic obstructive pulmonary disease) (HCC)    Bullous emphysema (HCC)    Thoracic ascending aortic aneurysm    Glucose intolerance    Pulmonary Mycobacterium avium complex (MAC) infection (HCC)    Chronic respiratory failure with hypoxia (HCC)    Chronic pain syndrome    Immunocompromised (HCC)    Iron deficiency  Resolved Problems:    * No resolved hospital problems.  *      PLAN:  SEE ORDERS      RE  CHANGES AND FINDINGS   Medications reviewed with patient  GI prophylaxis  DVT prophylaxis  Pulmonary has been consulted  Restart ceftriaxone 1 g every 24 hours in view of elevated procalcitonin  Arformoterol 15 mcg twice daily  Azithromycin 500 mg by mouth every other day, MAC  Tessalon 200 mg 3 times daily  Budesonide 500 mcg twice daily  DuoNeb every 4 hours  Enoxaparin 40 mg daily  Methylprednisolone 60 mg IV every 6 hours  Protonix 40 mg daily  Guaifenesin with codeine 5 cc every 4 hours as needed for cough  Percocet 5, 1 tablet twice daily as needed for pain  Potassium replacement protocol  11/20/2022  Nystatin oral suspension 500,000 units 4 times daily  Magic/miracle mouthwash  Azithromycin 500 mg every 48 hours  Arformoterol 15 mcg twice daily  Budesonide 500 mcg twice daily  Ceftriaxone 1 g IV every 24 hours in view of elevated prolactin  Enoxaparin 40 mg subcu daily  Ferrous sulfate 325 mg by mouth twice daily  DuoNeb every 4 hours while awake  Methylprednisolone 60 mg IV every 6 hours  Continue Cepacol spray as needed      Discussed with patient and nursing. Hussein Jaimes DO     11:17 AM     11/20/2022    TIME > 35 MINUTES    >  50 %  OF  TIME  DISCUSSION               ------------  INFORMATION  -----------      DIET:ADULT DIET; Regular      No Known Allergies      MEDICATION SIDE EFFECTS:none       SCHEDULED MEDS:                                 Scheduled Meds:   budesonide  500 mcg Nebulization BID    cefTRIAXone (ROCEPHIN) IV  1,000 mg IntraVENous Q24H    ferrous sulfate  325 mg Oral BID WC    pantoprazole  40 mg Oral QAM AC    Arformoterol Tartrate  15 mcg Nebulization BID    benzonatate  200 mg Oral TID    methylPREDNISolone  60 mg IntraVENous Q8H    enoxaparin  40 mg SubCUTAneous Daily    azithromycin  500 mg Oral Every Other Day    lactobacillus  1 capsule Oral Daily    ipratropium-albuterol  1 ampule Inhalation Q4H WA       Continuous Infusions:   sodium chloride 50 mL/hr at 11/20/22 6077         Data:     No intake or output data in the 24 hours ending 11/20/22 1117    Wt Readings from Last 3 Encounters:   11/20/22 193 lb 5 oz (87.7 kg)   11/17/22 195 lb (88.5 kg)   10/27/22 192 lb (87.1 kg)       Labs: Additional    GLUCOSE:No results for input(s): POCGLU in the last 72 hours.     BNP:No results found for: BNP    CRP:   Recent Labs     11/18/22  1700 11/19/22  1031 11/20/22  0520   CRP 0.9* 2.6* 1.4*       ESR:  Recent Labs 11/18/22  1700 11/19/22  1031 11/20/22  0520   SEDRATE 0 2 0       RADIOLOGY: REVIEWED AVAILABLE REPORT  XR CHEST PORTABLE   Final Result   Unchanged emphysema, fibrosis, likely pulmonary arterial hypertension and   right-sided pleural thickening.                    Vicky Campoverde DO    11:17 AM     11/20/2022      Voice recognition software used for dictation

## 2022-11-21 LAB
ALBUMIN SERPL-MCNC: 3.6 G/DL (ref 3.5–5.2)
ALP BLD-CCNC: 55 U/L (ref 40–129)
ALT SERPL-CCNC: 13 U/L (ref 0–40)
ANION GAP SERPL CALCULATED.3IONS-SCNC: 8 MMOL/L (ref 7–16)
ANISOCYTOSIS: ABNORMAL
AST SERPL-CCNC: 17 U/L (ref 0–39)
BASOPHILS ABSOLUTE: 0 E9/L (ref 0–0.2)
BASOPHILS RELATIVE PERCENT: 0 % (ref 0–2)
BILIRUB SERPL-MCNC: 0.2 MG/DL (ref 0–1.2)
BILIRUBIN DIRECT: <0.2 MG/DL (ref 0–0.3)
BILIRUBIN, INDIRECT: ABNORMAL MG/DL (ref 0–1)
BUN BLDV-MCNC: 15 MG/DL (ref 6–23)
C-REACTIVE PROTEIN: 0.6 MG/DL (ref 0–0.4)
CALCIUM SERPL-MCNC: 9.6 MG/DL (ref 8.6–10.2)
CHLORIDE BLD-SCNC: 101 MMOL/L (ref 98–107)
CO2: 27 MMOL/L (ref 22–29)
CREAT SERPL-MCNC: 0.5 MG/DL (ref 0.7–1.2)
EOSINOPHILS ABSOLUTE: 0 E9/L (ref 0.05–0.5)
EOSINOPHILS RELATIVE PERCENT: 0 % (ref 0–6)
GFR SERPL CREATININE-BSD FRML MDRD: >60 ML/MIN/1.73
GLUCOSE BLD-MCNC: 143 MG/DL (ref 74–99)
HCT VFR BLD CALC: 45.3 % (ref 37–54)
HEMOGLOBIN: 15.1 G/DL (ref 12.5–16.5)
IMMATURE GRANULOCYTES #: 0.03 E9/L
IMMATURE GRANULOCYTES %: 0.4 % (ref 0–5)
LACTIC ACID, SEPSIS: 2 MMOL/L (ref 0.5–1.9)
LYMPHOCYTES ABSOLUTE: 0.4 E9/L (ref 1.5–4)
LYMPHOCYTES RELATIVE PERCENT: 5.5 % (ref 20–42)
MAGNESIUM: 2 MG/DL (ref 1.6–2.6)
MCH RBC QN AUTO: 30.8 PG (ref 26–35)
MCHC RBC AUTO-ENTMCNC: 33.3 % (ref 32–34.5)
MCV RBC AUTO: 92.4 FL (ref 80–99.9)
MONOCYTES ABSOLUTE: 0.34 E9/L (ref 0.1–0.95)
MONOCYTES RELATIVE PERCENT: 4.7 % (ref 2–12)
NEUTROPHILS ABSOLUTE: 6.46 E9/L (ref 1.8–7.3)
NEUTROPHILS RELATIVE PERCENT: 89.4 % (ref 43–80)
PDW BLD-RTO: 13.6 FL (ref 11.5–15)
PHOSPHORUS: 2.8 MG/DL (ref 2.5–4.5)
PLATELET # BLD: 160 E9/L (ref 130–450)
PMV BLD AUTO: 10.5 FL (ref 7–12)
POTASSIUM SERPL-SCNC: 4.1 MMOL/L (ref 3.5–5)
RBC # BLD: 4.9 E12/L (ref 3.8–5.8)
SEDIMENTATION RATE, ERYTHROCYTE: 1 MM/HR (ref 0–15)
SODIUM BLD-SCNC: 136 MMOL/L (ref 132–146)
TOTAL PROTEIN: 5.7 G/DL (ref 6.4–8.3)
URINE CULTURE, ROUTINE: NORMAL
WBC # BLD: 7.2 E9/L (ref 4.5–11.5)

## 2022-11-21 PROCEDURE — 83605 ASSAY OF LACTIC ACID: CPT

## 2022-11-21 PROCEDURE — 85025 COMPLETE CBC W/AUTO DIFF WBC: CPT

## 2022-11-21 PROCEDURE — 93306 TTE W/DOPPLER COMPLETE: CPT

## 2022-11-21 PROCEDURE — 6360000002 HC RX W HCPCS: Performed by: INTERNAL MEDICINE

## 2022-11-21 PROCEDURE — 6370000000 HC RX 637 (ALT 250 FOR IP): Performed by: INTERNAL MEDICINE

## 2022-11-21 PROCEDURE — 80076 HEPATIC FUNCTION PANEL: CPT

## 2022-11-21 PROCEDURE — 84100 ASSAY OF PHOSPHORUS: CPT

## 2022-11-21 PROCEDURE — 36415 COLL VENOUS BLD VENIPUNCTURE: CPT

## 2022-11-21 PROCEDURE — 2580000003 HC RX 258: Performed by: INTERNAL MEDICINE

## 2022-11-21 PROCEDURE — 83735 ASSAY OF MAGNESIUM: CPT

## 2022-11-21 PROCEDURE — 86140 C-REACTIVE PROTEIN: CPT

## 2022-11-21 PROCEDURE — 94640 AIRWAY INHALATION TREATMENT: CPT

## 2022-11-21 PROCEDURE — 2060000000 HC ICU INTERMEDIATE R&B

## 2022-11-21 PROCEDURE — 85651 RBC SED RATE NONAUTOMATED: CPT

## 2022-11-21 PROCEDURE — 80048 BASIC METABOLIC PNL TOTAL CA: CPT

## 2022-11-21 RX ORDER — METHYLPREDNISOLONE SODIUM SUCCINATE 40 MG/ML
40 INJECTION, POWDER, LYOPHILIZED, FOR SOLUTION INTRAMUSCULAR; INTRAVENOUS EVERY 12 HOURS
Status: DISCONTINUED | OUTPATIENT
Start: 2022-11-21 | End: 2022-11-22 | Stop reason: HOSPADM

## 2022-11-21 RX ADMIN — WATER 1000 MG: 1 INJECTION INTRAMUSCULAR; INTRAVENOUS; SUBCUTANEOUS at 12:46

## 2022-11-21 RX ADMIN — IPRATROPIUM BROMIDE AND ALBUTEROL SULFATE 1 AMPULE: 2.5; .5 SOLUTION RESPIRATORY (INHALATION) at 21:15

## 2022-11-21 RX ADMIN — ENOXAPARIN SODIUM 40 MG: 100 INJECTION SUBCUTANEOUS at 17:22

## 2022-11-21 RX ADMIN — ARFORMOTEROL TARTRATE 15 MCG: 15 SOLUTION RESPIRATORY (INHALATION) at 21:15

## 2022-11-21 RX ADMIN — BENZONATATE 200 MG: 100 CAPSULE ORAL at 12:46

## 2022-11-21 RX ADMIN — Medication 1 CAPSULE: at 10:48

## 2022-11-21 RX ADMIN — BENZONATATE 200 MG: 100 CAPSULE ORAL at 10:48

## 2022-11-21 RX ADMIN — ARFORMOTEROL TARTRATE 15 MCG: 15 SOLUTION RESPIRATORY (INHALATION) at 10:04

## 2022-11-21 RX ADMIN — METHYLPREDNISOLONE SODIUM SUCCINATE 40 MG: 40 INJECTION, POWDER, LYOPHILIZED, FOR SOLUTION INTRAMUSCULAR; INTRAVENOUS at 05:03

## 2022-11-21 RX ADMIN — BUDESONIDE 500 MCG: 0.25 SUSPENSION RESPIRATORY (INHALATION) at 10:04

## 2022-11-21 RX ADMIN — BENZONATATE 200 MG: 100 CAPSULE ORAL at 20:50

## 2022-11-21 RX ADMIN — FERROUS SULFATE TAB 325 MG (65 MG ELEMENTAL FE) 325 MG: 325 (65 FE) TAB at 10:48

## 2022-11-21 RX ADMIN — IPRATROPIUM BROMIDE AND ALBUTEROL SULFATE 1 AMPULE: 2.5; .5 SOLUTION RESPIRATORY (INHALATION) at 12:54

## 2022-11-21 RX ADMIN — FERROUS SULFATE TAB 325 MG (65 MG ELEMENTAL FE) 325 MG: 325 (65 FE) TAB at 17:21

## 2022-11-21 RX ADMIN — PANTOPRAZOLE SODIUM 40 MG: 40 TABLET, DELAYED RELEASE ORAL at 05:03

## 2022-11-21 RX ADMIN — METHYLPREDNISOLONE SODIUM SUCCINATE 40 MG: 40 INJECTION, POWDER, LYOPHILIZED, FOR SOLUTION INTRAMUSCULAR; INTRAVENOUS at 17:31

## 2022-11-21 RX ADMIN — IPRATROPIUM BROMIDE AND ALBUTEROL SULFATE 1 AMPULE: 2.5; .5 SOLUTION RESPIRATORY (INHALATION) at 10:03

## 2022-11-21 RX ADMIN — NYSTATIN 500000 UNITS: 500000 SUSPENSION ORAL at 12:46

## 2022-11-21 RX ADMIN — NYSTATIN 500000 UNITS: 500000 SUSPENSION ORAL at 10:48

## 2022-11-21 RX ADMIN — NYSTATIN 500000 UNITS: 500000 SUSPENSION ORAL at 17:27

## 2022-11-21 RX ADMIN — BUDESONIDE 500 MCG: 0.25 SUSPENSION RESPIRATORY (INHALATION) at 21:14

## 2022-11-21 RX ADMIN — SODIUM CHLORIDE: 9 INJECTION, SOLUTION INTRAVENOUS at 12:53

## 2022-11-21 RX ADMIN — NYSTATIN 500000 UNITS: 500000 SUSPENSION ORAL at 20:50

## 2022-11-21 NOTE — PROGRESS NOTES
[facility]  [address] [facility], [address] [place] [address] 93, [address] [facility]      Pulmonary Progress Note      SUBJECTIVE:  claims feeling some better with breathing and less cough though still with some sore throay. Still on 8 li HFNC and saturating about 95%, sitting up in bed.     OBJECTIVE    Medications    Continuous Infusions:   sodium chloride 50 mL/hr at 22 1752       Scheduled Meds:   nystatin  5 mL Oral 4x Daily    methylPREDNISolone  40 mg IntraVENous Q8H    budesonide  500 mcg Nebulization BID    cefTRIAXone (ROCEPHIN) IV  1,000 mg IntraVENous Q24H    ferrous sulfate  325 mg Oral BID WC    pantoprazole  40 mg Oral QAM AC    Arformoterol Tartrate  15 mcg Nebulization BID    benzonatate  200 mg Oral TID    enoxaparin  40 mg SubCUTAneous Daily    azithromycin  500 mg Oral Every Other Day    lactobacillus  1 capsule Oral Daily    ipratropium-albuterol  1 ampule Inhalation Q4H WA       PRN Meds:magic (miracle) mouthwash, guaiFENesin-codeine, potassium chloride **OR** potassium alternative oral replacement **OR** potassium chloride, acetaminophen, oxyCODONE-acetaminophen **AND** oxyCODONE, phenol    Physical    VITALS:  /87   Pulse 68   Temp 98 °F (36.7 °C) (Oral)   Resp 18   Ht 6' 1\" (1.854 m)   Wt 193 lb 5 oz (87.7 kg)   SpO2 95%   BMI 25.50 kg/m²     24HR INTAKE/OUTPUT:    No intake or output data in the 24 hours ending 22 0842      24HR PULSE OXIMETRY RANGE:    SpO2  Av.7 %  Min: 92 %  Max: 95 %    General appearance: alert, appears stated age, and cooperative  Lungs: rhonchi bibasilar  Heart: regular rate and rhythm, S1, S2 normal, no murmur, click, rub or gallop  Abdomen: soft, non-tender; bowel sounds normal; no masses,  no organomegaly  Extremities: extremities normal, atraumatic, no cyanosis or edema  Neurologic: Mental status: Alert, oriented, thought content appropriate    Data    CBC:   Recent Labs     22  1031 11/20/22  0520 11/21/22  0505   WBC 8.8 8.9 7.2   HGB 17.1* 15.5 15.1   HCT 51.0 46.9 45.3   MCV 93.1 91.4 92.4    150 160         BMP:  Recent Labs     11/18/22  1123 11/19/22  1031 11/20/22  0520 11/20/22  1335 11/21/22  0505     --   --  137 136   K 4.4  --   --  3.8 4.1     --   --  102 101   CO2 24  --   --  23 27   PHOS  --  2.4* 2.7  --  2.8   BUN 6  --   --  13 15   CREATININE 0.8  --   --  0.6* 0.5*      ALB:3,BILIDIR:3,BILITOT:3,ALKPHOS:3)@    PT/INR: No results for input(s): PROTIME, INR in the last 72 hours. ABG:   No results for input(s): PH, PO2, PCO2, HCO3, BE, O2SAT, METHB, O2HB, COHB, O2CON, HHB, THB in the last 72 hours. 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 *      Plan:       Cont with steroids, taper as tolerated  Cont with nebs, observe respiratory function  Cont with oxygen, decreased to 6 li, observe oxygenation and taper further as tolerated  Incentive spirometer and see if helps  Cont with zithomax tiw for MAC  OOB to chair as tolerated      Time at the bedside, reviewing labs and radiographs, reviewing notes and consultations, discussing with staff and family was more than 35 minutes.       Thanks for letting us see this patient in consultation. Please contact us with any questions. Office [phone] or after hours through [employer], x [phone].

## 2022-11-21 NOTE — PROGRESS NOTES
Occupational Therapy    OT eval and treat orders received and chart reviewed. Attempt made but pt able to complete own self care and walk to and from bathroom. Pt with no acute OT needs at this time and no further questions or concerns. OT orders discontinued and pt agreed.     Rula Rowland, OTR/L

## 2022-11-21 NOTE — PROGRESS NOTES
Physical Therapy  Facility/Department: [address] INTERNAL MEDICINE 2      Name: Julia Tes  : 1950  MRN: [id number]  Date of Service: 2022    Order received for PT evaluation. Pt reports he is up independently in room. Denies any PT needs at this time.    Federico Hernandez PT [id number]

## 2022-11-21 NOTE — PROGRESS NOTES
PROGRESS  NOTE --                                                          INTERNAL  MEDICINE                                                                              I  PERSONALLY SAW , EXAMINED, AND CARED [address], 11/21/2022     LABS, XRAY ,CHART, AND MEDICATIONS  REVIEWED BY ME       Chief complaint:   Increasing shortness of breath      11/19/2022-SUBJECTIVE: Stephania Morrison is alert awake and cooperative; oriented ×3. Denies any chest pain nausea emesis. Tolerating diet. No abdominal pain. Minimal shortness of breath with exertion. States he had productive cough with sputum production this a.m. specimen was sent. Still having left-sided throat discomfort. Afebrile last 24 hours. Pulse 92 blood pressure 101/60. SPO2 94 on 8 L nasal cannula. Intake and output not available. Ionized calcium was 1.35. Lactic acid elevated 3.0. Phosphorus low at 2.4. Procalcitonin 0.18. CRP from yesterday 0.9.  proBNP from yesterday 206. WBC today 8.8 hemoglobin 17.1. Legionella and strep antigens presumptive negative. Urine culture in process blood cultures in process. CEA normal at 3.3 ASO normal at 25.      11/20/2022-patient sitting up in bed; states he is feeling slightly better. Has been up to the bathroom without great difficulty. No chest pain minimal shortness of breath. Currently on 8 L nasal cannula high flow. Numerous questions regarding his wife who is sick at home likely with RSV also and he would like my recommendations. She had temperature slightly over 100; no shortness of breath no other significant symptoms; I suggest symptomatic treatment only for her. He states she has no significant health problems. He is still having some pain left side of his throat. Highest temperature last 24 hours 99.1, currently 98. 6. Respirations 18 pulse 95 blood pressure 134/77. SPO2 93 on 8 L nasal cannula high flow. Intake and output +360 cc. BNP was apparently not drawn today. Lactic acid improving at 2.2. Protein 5.9 magnesium 1.9. CRP improving, 1.4 still elevated. Hepatic function panel normal.  WBC 8.9 hemoglobin 15.5. Iron low at 51 TIBC low at 15. Ferritin normal at 151 TIBC normal 338 T01 folic acid normal.  ESR 2. Urine culture growth not present incubation continues blood cultures negative to date. Respiratory culture pending. Legionella strep antigens presumptive negative. Pulmonary consult from yesterday appreciated. Continue steroids taper as tolerated continue azithromycin for chronic MAC. Continue nebulizers. 11/21/2022-sitting up in bed, no chest pain. Has been up walking in the room without great difficulty or shortness of breath. Appetite fair; still has some left sided throat soreness. Afebrile last 24 hours. Blood pressure 129/75. SPO2 95 on 6 L nasal cannula. Intake and output +360 cc, likely inaccurate has not changed. BUN 15 creatinine 0.5 lactic acid improving 2.0. Fasting glucose 143. CRP much improved 0.6. WBC 7.2 hemoglobin 15.1. ESR 1. Pulmonary note from yesterday, feeling some better. Ambulating a little bit in the room. Continue azithromycin for MAC out of bed to chair. Pulmonary note from today, claims to be feeling some better still some sore throat. Saturating 95% with high flow nasal cannula 8 L/min. Taper steroids reduce oxygen to 6 L/min. Therapy note from today, patient independent no need for therapy.       Objective:     PHYSICAL EXAM:    VS: /75   Pulse 85   Temp 98.2 °F (36.8 °C) (Oral)   Resp 16   Ht 6' 1\" (1.854 m)   Wt 193 lb 5 oz (87.7 kg)   SpO2 95%   BMI 25.50 kg/m²     Labs:   CBC:   Lab Results   Component Value Date/Time    WBC 7.2 11/21/2022 05:05 AM    RBC 4.90 11/21/2022 05:05 AM    HGB 15.1 11/21/2022 05:05 AM    HCT 45.3 11/21/2022 05:05 AM    MCV 92.4 11/21/2022 05:05 AM    MCH 30.8 11/21/2022 05:05 AM    MCHC 33.3 11/21/2022 05:05 AM    RDW 13.6 11/21/2022 05:05 AM     11/21/2022 05:05 AM    MPV 10.5 11/21/2022 05:05 AM     CBC with Differential:    Lab Results   Component Value Date/Time    WBC 7.2 11/21/2022 05:05 AM    RBC 4.90 11/21/2022 05:05 AM    HGB 15.1 11/21/2022 05:05 AM    HCT 45.3 11/21/2022 05:05 AM     11/21/2022 05:05 AM    MCV 92.4 11/21/2022 05:05 AM    MCH 30.8 11/21/2022 05:05 AM    MCHC 33.3 11/21/2022 05:05 AM    RDW 13.6 11/21/2022 05:05 AM    NRBC 0.0 11/19/2022 10:31 AM    METASPCT 0.9 04/13/2022 07:01 AM    LYMPHOPCT 5.5 11/21/2022 05:05 AM    MONOPCT 4.7 11/21/2022 05:05 AM    MYELOPCT 0.9 04/07/2021 10:35 AM    BASOPCT 0.0 11/21/2022 05:05 AM    MONOSABS 0.34 11/21/2022 05:05 AM    LYMPHSABS 0.40 11/21/2022 05:05 AM    EOSABS 0.00 11/21/2022 05:05 AM    BASOSABS 0.00 11/21/2022 05:05 AM     Hemoglobin/Hematocrit:    Lab Results   Component Value Date/Time    HGB 15.1 11/21/2022 05:05 AM    HCT 45.3 11/21/2022 05:05 AM     CMP:    Lab Results   Component Value Date/Time     11/21/2022 05:05 AM    K 4.1 11/21/2022 05:05 AM    K 4.4 11/18/2022 11:23 AM     11/21/2022 05:05 AM    CO2 27 11/21/2022 05:05 AM    BUN 15 11/21/2022 05:05 AM    CREATININE 0.5 11/21/2022 05:05 AM    GFRAA >60 08/01/2022 03:55 PM    LABGLOM >60 11/21/2022 05:05 AM    GLUCOSE 143 11/21/2022 05:05 AM    PROT 5.7 11/21/2022 05:05 AM    LABALBU 3.6 11/21/2022 05:05 AM    CALCIUM 9.6 11/21/2022 05:05 AM    BILITOT 0.2 11/21/2022 05:05 AM    ALKPHOS 55 11/21/2022 05:05 AM    AST 17 11/21/2022 05:05 AM    ALT 13 11/21/2022 05:05 AM     BMP:    Lab Results   Component Value Date/Time     11/21/2022 05:05 AM    K 4.1 11/21/2022 05:05 AM    K 4.4 11/18/2022 11:23 AM     11/21/2022 05:05 AM    CO2 27 11/21/2022 05:05 AM    BUN 15 11/21/2022 05:05 AM    LABALBU 3.6 11/21/2022 05:05 AM    CREATININE 0.5 11/21/2022 05:05 AM    CALCIUM 9.6 11/21/2022 05:05 AM    GFRAA >60 08/01/2022 03:55 PM    LABGLOM >60 11/21/2022 05:05 AM    GLUCOSE 143 11/21/2022 05:05 AM     Hepatic Function Panel:    Lab Results   Component Value Date/Time    ALKPHOS 55 11/21/2022 05:05 AM    ALT 13 11/21/2022 05:05 AM    AST 17 11/21/2022 05:05 AM    PROT 5.7 11/21/2022 05:05 AM    BILITOT 0.2 11/21/2022 05:05 AM    BILIDIR <0.2 11/21/2022 05:05 AM    IBILI see below 11/21/2022 05:05 AM    LABALBU 3.6 11/21/2022 05:05 AM     Ionized Calcium:  No results found for: IONCA  Magnesium:    Lab Results   Component Value Date/Time    MG 2.0 11/21/2022 05:05 AM     Phosphorus:    Lab Results   Component Value Date/Time    PHOS 2.8 11/21/2022 05:05 AM     LDH:    Lab Results   Component Value Date/Time     01/02/2021 05:55 PM     Uric Acid:    Lab Results   Component Value Date/Time    LABURIC 5.6 11/19/2022 10:31 AM     PT/INR:    Lab Results   Component Value Date/Time    PROTIME 12.5 05/30/2019 11:38 AM    INR 1.1 05/30/2019 11:38 AM     Warfarin PT/INR:  No components found for: PTPATWAR, PTINRWAR  PTT:    Lab Results   Component Value Date/Time    APTT 31.4 05/30/2019 11:38 AM   [APTT}  Troponin:    Lab Results   Component Value Date/Time    TROPONINI <0.01 04/08/2021 06:10 PM     Last 3 Troponin:    Lab Results   Component Value Date/Time    TROPONINI <0.01 04/08/2021 06:10 PM    TROPONINI <0.01 04/08/2021 12:50 PM    TROPONINI <0.01 01/02/2021 05:55 PM     U/A:    Lab Results   Component Value Date/Time    COLORU Yellow 11/18/2022 04:20 PM    PROTEINU Negative 11/18/2022 04:20 PM    PHUR 5.5 11/18/2022 04:20 PM    LABCAST FEW 10/02/2019 05:32 PM    [address] NONE 04/07/2021 10:35 AM    RBCUA NONE 04/07/2021 10:35 AM    MUCUS Present 04/07/2021 10:35 AM    BACTERIA FEW 04/07/2021 10:35 AM    CLARITYU Clear 11/18/2022 04:20 PM    SPECGRAV >=1.030 11/18/2022 04:20 PM    LEUKOCYTESUR Negative 11/18/2022 04:20 PM    UROBILINOGEN 0.2 11/18/2022 04:20 PM    BILIRUBINUR Negative 11/18/2022 04:20 PM    BLOODU Negative 11/18/2022 04:20 PM    GLUCOSEU Negative 11/18/2022 04:20 PM     HgBA1c:    Lab Results   Component Value Date/Time    LABA1C 5.3 11/19/2022 10:31 AM     FLP:    Lab Results   Component Value Date/Time    TRIG 67 11/19/2022 10:31 AM    HDL 67 11/19/2022 10:31 AM    LDLCALC 75 11/19/2022 10:31 AM    LABVLDL 13 11/19/2022 10:31 AM     TSH:    Lab Results   Component Value Date/Time    TSH 0.400 11/19/2022 10:31 AM     VITAMIN B12: No components found for: B12  FOLATE:    Lab Results   Component Value Date/Time    FOLATE 8.8 11/19/2022 10:31 AM     IRON:    Lab Results   Component Value Date/Time    IRON 51 11/19/2022 10:31 AM     Iron Saturation:  No components found for: PERCENTFE  TIBC:    Lab Results   Component Value Date/Time    TIBC 337 11/19/2022 10:31 AM     FERRITIN:    Lab Results   Component Value Date/Time    FERRITIN 151 11/19/2022 10:31 AM        General appearance: Alert, Awake, Oriented times 3, no distress; nasal cannula oxygen in place  Skin: Warm and dry ; no rashes  Head: Normocephalic. No masses, lesions or tenderness noted  Eyes: Conjunctivae pink, sclera white. PERRL,EOM-I  Ears: External ears normal  Nose/Sinuses: Nares normal. Septum midline. Mucosa normal. No drainage  Oropharynx: Oropharynx clear, I cannot see any Candida lesions  Neck: Supple. No jugular venous distension, lymphadenopathy or thyromegaly Trachea midline; mildly tender to palpation left side of neck  Lungs: Soft wheeze throughout rare rhonchi anteriorly  Heart: S1 S2  Regular rate and rhythm. No rub, gallop, murmur  Abdomen: Soft, non-tender. BS normal. No masses, organomegaly; no rebound or guarding  Extremities: No edema, Peripheral pulses palpable  Musculoskeletal: Muscular strength appears intact. Neuro:  No focal motor defects ; II-XII grossly intact .  GLASER equally    TELEMETRY: REVIEWED--Telemetry: Sinus    ASSESSMENT:   Principal Problem:    COPD exacerbation (HCC)  Active Problems:    Acute respiratory failure with hypoxia (HCC)    RSV (acute bronchiolitis due to respiratory syncytial virus)    COPD (chronic obstructive pulmonary disease) (HCC)    Bullous emphysema (HCC)    Thoracic ascending aortic aneurysm    Glucose intolerance    Pulmonary Mycobacterium avium complex (MAC) infection (HCC)    Chronic respiratory failure with hypoxia (HCC)    Chronic pain syndrome    Immunocompromised (HCC)    Iron deficiency  Resolved Problems:    * No resolved hospital problems.  *      PLAN:  SEE ORDERS      RE  CHANGES AND FINDINGS   Medications reviewed with patient  GI prophylaxis  DVT prophylaxis  Pulmonary has been consulted  Restart ceftriaxone 1 g every 24 hours in view of elevated procalcitonin  Arformoterol 15 mcg twice daily  Azithromycin 500 mg by mouth every other day, MAC  Tessalon 200 mg 3 times daily  Budesonide 500 mcg twice daily  DuoNeb every 4 hours  Enoxaparin 40 mg daily  Methylprednisolone 60 mg IV every 6 hours  Protonix 40 mg daily  Guaifenesin with codeine 5 cc every 4 hours as needed for cough  Percocet 5, 1 tablet twice daily as needed for pain  Potassium replacement protocol  11/20/2022  Nystatin oral suspension 500,000 units 4 times daily  Magic/miracle mouthwash  Azithromycin 500 mg every 48 hours  Arformoterol 15 mcg twice daily  Budesonide 500 mcg twice daily  Ceftriaxone 1 g IV every 24 hours in view of elevated prolactin  Enoxaparin 40 mg subcu daily  Ferrous sulfate 325 mg by mouth twice daily  DuoNeb every 4 hours while awake  Methylprednisolone 60 mg IV every 6 hours  Continue Cepacol spray as needed  11/21/2022  Azithromycin 500 mg every 48 hours  Budesonide 500 mcg twice daily  Arformoterol 15 mcg twice daily  Ceftriaxone 1 g IV every 24 hours, elevated procalcitonin  DuoNeb every 4 hours while awake  Methylprednisolone has been decreased to 40 mg IV every 12 hours  Nystatin oral suspension 4 times daily  Magic mouthwash as needed  Percocet 5, 1 tablet twice daily as needed for pain    Discussed with patient and nursing. Jim Jaimes,      1:00 PM     11/21/2022    TIME > 35 MINUTES    >  50 %  OF  TIME  DISCUSSION               ------------  INFORMATION  -----------      DIET:ADULT DIET; Regular      No Known Allergies      MEDICATION SIDE EFFECTS:none       SCHEDULED MEDS:                                 Scheduled Meds:   methylPREDNISolone  40 mg IntraVENous Q12H    nystatin  5 mL Oral 4x Daily    budesonide  500 mcg Nebulization BID    cefTRIAXone (ROCEPHIN) IV  1,000 mg IntraVENous Q24H    ferrous sulfate  325 mg Oral BID WC    pantoprazole  40 mg Oral QAM AC    Arformoterol Tartrate  15 mcg Nebulization BID    benzonatate  200 mg Oral TID    enoxaparin  40 mg SubCUTAneous Daily    azithromycin  500 mg Oral Every Other Day    lactobacillus  1 capsule Oral Daily    ipratropium-albuterol  1 ampule Inhalation Q4H WA       Continuous Infusions:   sodium chloride 50 mL/hr at 11/21/22 1253         Data:     No intake or output data in the 24 hours ending 11/21/22 1300    Wt Readings from Last 3 Encounters:   11/20/22 193 lb 5 oz (87.7 kg)   11/17/22 195 lb (88.5 kg)   10/27/22 192 lb (87.1 kg)       Labs: Additional    GLUCOSE:No results for input(s): POCGLU in the last 72 hours. BNP:No results found for: BNP    CRP:   Recent Labs     11/19/22  1031 11/20/22  0520 11/21/22  0505   CRP 2.6* 1.4* 0.6*       ESR:  Recent Labs     11/19/22  1031 11/20/22  0520 11/21/22  0505   SEDRATE 2 0 1       RADIOLOGY: REVIEWED AVAILABLE REPORT  XR CHEST PORTABLE   Final Result   Unchanged emphysema, fibrosis, likely pulmonary arterial hypertension and   right-sided pleural thickening.                    [address],     1:00 PM     11/21/2022      Voice recognition software used for dictation

## 2022-11-21 NOTE — CARE COORDINATION
+ RSV. Met w/ patient. Explained role of  and plan of care. Lives w/ his wife in a 1 story house- 2 steps to entrance. Independent PTA. No Hx HHC or PATRICIA. Requiring O2 6l high flow NC- wears home O2 3.5 liters provided by [facility]- has portability and nebulizer-will need O2 testing/ order if unable to wean O2 to 3.5 liters on discharge. PCP is Dr. Consuelo Eldridge and pharmacy is [facility]. On po/iv abxs, iv steroid.  Per pt, plan is to return home on discharge- states drove himself to the hospital. Will follow Quiana Ledbetter RN case manager

## 2022-11-22 VITALS
DIASTOLIC BLOOD PRESSURE: 91 MMHG | SYSTOLIC BLOOD PRESSURE: 134 MMHG | WEIGHT: 192.3 LBS | BODY MASS INDEX: 25.49 KG/M2 | HEIGHT: 73 IN | OXYGEN SATURATION: 94 % | RESPIRATION RATE: 18 BRPM | TEMPERATURE: 97.9 F | HEART RATE: 87 BPM

## 2022-11-22 LAB
ALBUMIN SERPL-MCNC: 3.6 G/DL (ref 3.5–5.2)
ALP BLD-CCNC: 60 U/L (ref 40–129)
ALT SERPL-CCNC: 15 U/L (ref 0–40)
ANION GAP SERPL CALCULATED.3IONS-SCNC: 9 MMOL/L (ref 7–16)
AST SERPL-CCNC: 22 U/L (ref 0–39)
BASOPHILS ABSOLUTE: 0.01 E9/L (ref 0–0.2)
BASOPHILS RELATIVE PERCENT: 0.1 % (ref 0–2)
BILIRUB SERPL-MCNC: 0.3 MG/DL (ref 0–1.2)
BILIRUBIN DIRECT: <0.2 MG/DL (ref 0–0.3)
BILIRUBIN, INDIRECT: ABNORMAL MG/DL (ref 0–1)
BUN BLDV-MCNC: 16 MG/DL (ref 6–23)
C-REACTIVE PROTEIN: 0.3 MG/DL (ref 0–0.4)
CALCIUM SERPL-MCNC: 9.7 MG/DL (ref 8.6–10.2)
CHLORIDE BLD-SCNC: 101 MMOL/L (ref 98–107)
CO2: 25 MMOL/L (ref 22–29)
CREAT SERPL-MCNC: 0.5 MG/DL (ref 0.7–1.2)
EOSINOPHILS ABSOLUTE: 0 E9/L (ref 0.05–0.5)
EOSINOPHILS RELATIVE PERCENT: 0 % (ref 0–6)
GFR SERPL CREATININE-BSD FRML MDRD: >60 ML/MIN/1.73
GLUCOSE BLD-MCNC: 120 MG/DL (ref 74–99)
HCT VFR BLD CALC: 48.3 % (ref 37–54)
HEMOGLOBIN: 16.2 G/DL (ref 12.5–16.5)
IMMATURE GRANULOCYTES #: 0.03 E9/L
IMMATURE GRANULOCYTES %: 0.4 % (ref 0–5)
LACTIC ACID, SEPSIS: 2 MMOL/L (ref 0.5–1.9)
LYMPHOCYTES ABSOLUTE: 0.72 E9/L (ref 1.5–4)
LYMPHOCYTES RELATIVE PERCENT: 9.8 % (ref 20–42)
MAGNESIUM: 2.1 MG/DL (ref 1.6–2.6)
MCH RBC QN AUTO: 31.2 PG (ref 26–35)
MCHC RBC AUTO-ENTMCNC: 33.5 % (ref 32–34.5)
MCV RBC AUTO: 92.9 FL (ref 80–99.9)
MONOCYTES ABSOLUTE: 0.43 E9/L (ref 0.1–0.95)
MONOCYTES RELATIVE PERCENT: 5.8 % (ref 2–12)
NEUTROPHILS ABSOLUTE: 6.17 E9/L (ref 1.8–7.3)
NEUTROPHILS RELATIVE PERCENT: 83.9 % (ref 43–80)
PDW BLD-RTO: 13.5 FL (ref 11.5–15)
PHOSPHORUS: 3.1 MG/DL (ref 2.5–4.5)
PLATELET # BLD: 177 E9/L (ref 130–450)
PMV BLD AUTO: 9.9 FL (ref 7–12)
POTASSIUM SERPL-SCNC: 4.4 MMOL/L (ref 3.5–5)
RBC # BLD: 5.2 E12/L (ref 3.8–5.8)
SEDIMENTATION RATE, ERYTHROCYTE: 1 MM/HR (ref 0–15)
SODIUM BLD-SCNC: 135 MMOL/L (ref 132–146)
TOTAL PROTEIN: 5.9 G/DL (ref 6.4–8.3)
WBC # BLD: 7.4 E9/L (ref 4.5–11.5)

## 2022-11-22 PROCEDURE — 2700000000 HC OXYGEN THERAPY PER DAY

## 2022-11-22 PROCEDURE — 86140 C-REACTIVE PROTEIN: CPT

## 2022-11-22 PROCEDURE — 83605 ASSAY OF LACTIC ACID: CPT

## 2022-11-22 PROCEDURE — 83735 ASSAY OF MAGNESIUM: CPT

## 2022-11-22 PROCEDURE — 85025 COMPLETE CBC W/AUTO DIFF WBC: CPT

## 2022-11-22 PROCEDURE — 94640 AIRWAY INHALATION TREATMENT: CPT

## 2022-11-22 PROCEDURE — 85651 RBC SED RATE NONAUTOMATED: CPT

## 2022-11-22 PROCEDURE — 6360000002 HC RX W HCPCS: Performed by: INTERNAL MEDICINE

## 2022-11-22 PROCEDURE — 6370000000 HC RX 637 (ALT 250 FOR IP): Performed by: INTERNAL MEDICINE

## 2022-11-22 PROCEDURE — 2580000003 HC RX 258: Performed by: INTERNAL MEDICINE

## 2022-11-22 PROCEDURE — 80048 BASIC METABOLIC PNL TOTAL CA: CPT

## 2022-11-22 PROCEDURE — 80076 HEPATIC FUNCTION PANEL: CPT

## 2022-11-22 PROCEDURE — 84100 ASSAY OF PHOSPHORUS: CPT

## 2022-11-22 PROCEDURE — 36415 COLL VENOUS BLD VENIPUNCTURE: CPT

## 2022-11-22 RX ORDER — BENZONATATE 200 MG/1
200 CAPSULE ORAL 3 TIMES DAILY
Qty: 21 CAPSULE | Refills: 0 | Status: SHIPPED | OUTPATIENT
Start: 2022-11-22 | End: 2022-11-29

## 2022-11-22 RX ORDER — CEFDINIR 300 MG/1
300 CAPSULE ORAL 2 TIMES DAILY
Qty: 10 CAPSULE | Refills: 0 | Status: SHIPPED | OUTPATIENT
Start: 2022-11-22 | End: 2022-11-27

## 2022-11-22 RX ORDER — LACTOBACILLUS RHAMNOSUS GG 10B CELL
1 CAPSULE ORAL DAILY
Qty: 30 CAPSULE | Refills: 1 | Status: SHIPPED | OUTPATIENT
Start: 2022-11-23 | End: 2022-12-23

## 2022-11-22 RX ORDER — PREDNISONE 10 MG/1
TABLET ORAL
Qty: 12 TABLET | Refills: 0 | Status: SHIPPED | OUTPATIENT
Start: 2022-11-22

## 2022-11-22 RX ADMIN — ARFORMOTEROL TARTRATE 15 MCG: 15 SOLUTION RESPIRATORY (INHALATION) at 09:06

## 2022-11-22 RX ADMIN — IPRATROPIUM BROMIDE AND ALBUTEROL SULFATE 1 AMPULE: 2.5; .5 SOLUTION RESPIRATORY (INHALATION) at 13:13

## 2022-11-22 RX ADMIN — NYSTATIN 500000 UNITS: 500000 SUSPENSION ORAL at 08:54

## 2022-11-22 RX ADMIN — FERROUS SULFATE TAB 325 MG (65 MG ELEMENTAL FE) 325 MG: 325 (65 FE) TAB at 08:53

## 2022-11-22 RX ADMIN — METHYLPREDNISOLONE SODIUM SUCCINATE 40 MG: 40 INJECTION, POWDER, LYOPHILIZED, FOR SOLUTION INTRAMUSCULAR; INTRAVENOUS at 06:04

## 2022-11-22 RX ADMIN — AZITHROMYCIN 500 MG: 250 TABLET, FILM COATED ORAL at 08:54

## 2022-11-22 RX ADMIN — BUDESONIDE 500 MCG: 0.25 SUSPENSION RESPIRATORY (INHALATION) at 09:06

## 2022-11-22 RX ADMIN — PANTOPRAZOLE SODIUM 40 MG: 40 TABLET, DELAYED RELEASE ORAL at 06:04

## 2022-11-22 RX ADMIN — Medication 1 CAPSULE: at 08:53

## 2022-11-22 RX ADMIN — NYSTATIN 500000 UNITS: 500000 SUSPENSION ORAL at 15:56

## 2022-11-22 RX ADMIN — BENZONATATE 200 MG: 100 CAPSULE ORAL at 15:56

## 2022-11-22 RX ADMIN — METHYLPREDNISOLONE SODIUM SUCCINATE 40 MG: 40 INJECTION, POWDER, LYOPHILIZED, FOR SOLUTION INTRAMUSCULAR; INTRAVENOUS at 16:22

## 2022-11-22 RX ADMIN — WATER 1000 MG: 1 INJECTION INTRAMUSCULAR; INTRAVENOUS; SUBCUTANEOUS at 11:27

## 2022-11-22 RX ADMIN — IPRATROPIUM BROMIDE AND ALBUTEROL SULFATE 1 AMPULE: 2.5; .5 SOLUTION RESPIRATORY (INHALATION) at 09:06

## 2022-11-22 RX ADMIN — BENZONATATE 200 MG: 100 CAPSULE ORAL at 08:53

## 2022-11-22 NOTE — PROGRESS NOTES
[facility] notified of O2 testing and patient now requiring 4.5 liters of oxygen for discharge. Patients portable concentrator is able to accommodate 4.5 liters.

## 2022-11-22 NOTE — PROGRESS NOTES
Pulmonary Progress Note    Admit Date: 2022  Hospital day                               PCP: Laura Smiley MD    Chief Complaint (s):  Patient Active Problem List   Diagnosis    COPD (chronic obstructive pulmonary disease) ([facility]    Bullous emphysema ([facility]    Thoracic ascending aortic aneurysm    S/P lumbar fusion    Hypophosphatemia    Glucose intolerance    Hilar adenopathy    Pulmonary Mycobacterium avium complex (MAC) infection (HCC)    Chronic respiratory failure with hypoxia (HCC)    Chronic pain syndrome    Lumbar degenerative disc disease    Chronic low back pain    Immunocompromised (HCC)    Iron deficiency    Acute exacerbation of chronic obstructive pulmonary disease (COPD) ([facility]    COPD exacerbation (HCC)    Acute respiratory failure with hypoxia (HCC)    RSV (acute bronchiolitis due to respiratory syncytial virus)       Subjective:  Patient seen on 4L nasal cannula. He is anxious to get home to help care for his wife. He feels overall improved.       Vitals:  VITALS:  /86   Pulse 81   Temp 97.8 °F (36.6 °C) (Oral)   Resp 20   Ht 6' 1\" (1.854 m)   Wt 192 lb 4.8 oz (87.2 kg)   SpO2 95%   BMI 25.37 kg/m²     24HR INTAKE/OUTPUT:      Intake/Output Summary (Last 24 hours) at 2022 1120  Last data filed at 2022 1200  Gross per 24 hour   Intake 180 ml   Output --   Net 180 ml       24HR PULSE OXIMETRY RANGE:    SpO2  Av %  Min: 91 %  Max: 95 %    Medications:  IV:   sodium chloride 50 mL/hr at 22 1253       Scheduled Meds:   methylPREDNISolone  40 mg IntraVENous Q12H    nystatin  5 mL Oral 4x Daily    budesonide  500 mcg Nebulization BID    cefTRIAXone (ROCEPHIN) IV  1,000 mg IntraVENous Q24H    ferrous sulfate  325 mg Oral BID WC    pantoprazole  40 mg Oral QAM AC    Arformoterol Tartrate  15 mcg Nebulization BID    benzonatate  200 mg Oral TID    enoxaparin  40 mg SubCUTAneous Daily    azithromycin  500 mg Oral Every Other Day    lactobacillus  1 capsule Oral Daily    ipratropium-albuterol  1 ampule Inhalation Q4H WA       Diet:   ADULT DIET; Regular     EXAM:  General: No distress. Alert. Eyes: PERRL. No sclera icterus. No conjunctival injection. ENT: No discharge. Pharynx clear. Neck: Trachea midline. Normal thyroid. Resp: No accessory muscle use. no rales. no wheezing. no rhonchi. Diminished. CV: Regular rate. Regular rhythm. No murmur or rub. Abd: Non-tender. Non-distended. No masses. No organomegaly. Normal bowel sounds. Skin: Warm and dry. No nodule on exposed extremities. No rash on exposed extremities. Ext: No cyanosis, clubbing, edema  Lymph: No cervical LAD. No supraclavicular LAD. M/S: No cyanosis. No joint deformity. No clubbing. Neuro: Awake. Follows commands. Positive pupils/gag/corneals. Normal pain response. Results:  CBC:   Recent Labs     11/20/22  0520 11/21/22  0505 11/22/22  0250   WBC 8.9 7.2 7.4   HGB 15.5 15.1 16.2   HCT 46.9 45.3 48.3   MCV 91.4 92.4 92.9    160 177     BMP:   Recent Labs     11/20/22  0520 11/20/22  1335 11/21/22  0505 11/22/22  0250   NA  --  137 136 135   K  --  3.8 4.1 4.4   CL  --  102 101 101   CO2  --  23 27 25   PHOS 2.7  --  2.8 3.1   BUN  --  13 15 16   CREATININE  --  0.6* 0.5* 0.5*     LIVER PROFILE:   Recent Labs     11/20/22  0520 11/21/22  0505 11/22/22  0250   AST 18 17 22   ALT 12 13 15   BILIDIR <0.2 <0.2 <0.2   BILITOT 0.3 0.2 0.3   ALKPHOS 61 55 60     PT/INR: No results for input(s): PROTIME, INR in the last 72 hours. APTT: No results for input(s): APTT in the last 72 hours. Pathology:  N/A      Microbiology:  None    Recent ABG:   No results for input(s): PH, PO2, PCO2, HCO3, BE, O2SAT, METHB, O2HB, COHB, O2CON, HHB, THB in the last 72 hours. Recent Films:  XR CHEST PORTABLE   Final Result   Unchanged emphysema, fibrosis, likely pulmonary arterial hypertension and   right-sided pleural thickening.              Assessment:  COPD exacerbation with RSV  History of mycobacterium avium complex. Plan:  Continue steroids. Possibly transition to oral.  Continue aerosols  Await Dr. Newamn Begin final opinion on discharge. *pt seen and examined  Agree with APRN note and recommendations. Can be discharged home with po steroid taper, cont with previous medications. Time at the bedside, reviewing labs and radiographs, reviewing updated notes and consultations, discussing with staff and family was more than 25 minutes. Please note that voice recognition technology was used in the preparation of this note and make therefore it may contain inadvertent transcription errors. If the patient is a COVID 19 isolation patient, the above physical exam reflects that of the examining physician for the day.         MIGUE Roblero - NP    [facility]    [facility], [facility], [address], [facility]  Office visits:  [place], [place], [facility]

## 2022-11-22 NOTE — PROGRESS NOTES
Pulse ox  4 liters at rest 91%  Pulse ox 4 liters 86% ambulating  Pulse ox 4.5 liters ambulating 89-90%  Pulse ox 4.5 liters at rest 93%

## 2022-11-22 NOTE — DISCHARGE SUMMARY
DISCHARGE SUMMARY  Patient ID:  Obinna Card  [id number]  81 y.o.  1950    Admit date: 11/18/2022      Discharge date : 11/22/2022    Admission Diagnoses: COPD exacerbation ([facility] [J44.1]  Acute respiratory failure with hypoxia ([facility] [J96.01]    Discharge Diagnosis  Principal Problem:    COPD exacerbation ([facility]  Active Problems:    Acute respiratory failure with hypoxia (HCC)    RSV (acute bronchiolitis due to respiratory syncytial virus)    COPD (chronic obstructive pulmonary disease) (HCC)    Bullous emphysema (HCC)    Thoracic ascending aortic aneurysm    Glucose intolerance    Pulmonary Mycobacterium avium complex (MAC) infection (HCC)    Chronic respiratory failure with hypoxia (HCC)    Chronic pain syndrome    Immunocompromised ([facility]    Iron deficiency  Resolved Problems:    * No resolved hospital problems. *      Hospital Course:    CHIEF COMPLAINT: Increasing shortness of breath     History of Present Illness: 81-year-old male patient of Dr. Armida Garcia I am asked admit and follow. History obtained from patient as well as extensive review electronic record. He was seen in Dr. Carlos Hunt office 11/17 due to sore throat head congestion and mild body aches. At the office visit he was on 4 L nasal cannula which is his normal.  Not expectorating significant sputum. Overnight he began having intermittent fevers chills increasing cough with yellow sputum headache and diarrhea. Also continues to have sore throat mostly left-sided. O2 saturation at time of office visit was 81%. In the ED he was placed on 6 L with improvement to 91% SPO2. On admission to the ED temperature 99.1 kelly to 100. Pulse on presentation 115, sinus tachycardia. Blood pressure on admission 132/71; currently 91/59. Current SPO2 is 93 on 6 L nasal cannula. Chest x-ray in the ED as follows--          FINDINGS:   Stable pulmonary emphysema and fibrosis with likely pulmonary arterial   hypertension.   Stable right-sided pleural thickening. Impression:       Unchanged emphysema, fibrosis, likely pulmonary arterial hypertension and   right-sided pleural thickening. Labs in the ED unremarkable random glucose of 82; hemoglobin 17.0 which is normal for him. WBC 9.4. There were 85.5% neutrophils with absolute neutrophils increased to 7.99. Rhinovirus and influenza by PCR were all not detected. Molecular/PCR viral respiratory panel however did detect RSV. --Admission 4/10/2022 due to acute exacerbation of COPD. --Admission of 3/14/2022 due to acute and chronic respiratory failure with hypoxia and COPD exacerbation. He was also found to have heart failure preserved ejection fraction; 2D echo with following results--      Summary   Left ventricle grossly normal in size. Normal LV segmental wall motion. Normal left ventricular wall thickness. Estimated left ventricular ejection fraction is 62±5%. Does not meet 50% diagnostic criteria for diastolic dysfunction. The LAESV Index is <34ml/m2. Moderately dilated right ventricle. TAPSE is normal   Physiologic and/or trace mitral regurgitation is present. Technically fair quality study. Compared to prior echo, no significant changes noted. Suggest clinical correlation. --Hospital admission 2/1/2022 due to acute respiratory failure with hypoxia. --Admission to the hospital 4/8/2021 due to acute heart failure with preserved ejection fraction  --Hospitalization 01/01/2021 due to acute respiratory failure due to SARS-CoV-2  --Found to have thoracic ascending aortic aneurysm 11/15/2018; proximal ascending aorta 3.8 cm  --Patient admitted 6/2019 due to parainfluenza virus pneumonia. The above was extremely slow resolving with left upper lung infiltrate. He then underwent bronchoscopy with BAL. --Fungal results became available approximately 7/12/2019, positive Fungitell.   --On 9/4/2019 sensitivities were available and ID recommended starting intravenous amikacin 1400 mg IV daily; rifampin 600 mg by mouth daily; clarithromycin 1000 mg/day ethambutol 1200 mg daily by mouth. 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 --Patient quit smoking , 92-pack-year history  --Patient has been vaccinated for COVID-19 x3 and received 2 booster vaccinations  --Denies any prior history of rheumatic fever scarlet fever polio diphtheria cancer. Patient is known glucose intolerance and is currently on sliding scale insulin coverage; he is on IV methylprednisolone . --Screening colonoscopy 2013 with polypectomy    2022-SUBJECTIVE: Audie Vieira is alert awake and cooperative; oriented ×3. Denies any chest pain nausea emesis. Tolerating diet. No abdominal pain. Minimal shortness of breath with exertion. States he had productive cough with sputum production this a.m. specimen was sent. Still having left-sided throat discomfort. Afebrile last 24 hours. Pulse 92 blood pressure 101/60. SPO2 94 on 8 L nasal cannula. Intake and output not available. Ionized calcium was 1.35. Lactic acid elevated 3.0. Phosphorus low at 2.4. Procalcitonin 0.18. CRP from yesterday 0.9.  proBNP from yesterday 206. WBC today 8.8 hemoglobin 17.1. Legionella and strep antigens presumptive negative. Urine culture in process blood cultures in process. CEA normal at 3.3 ASO normal at 25.        2022-patient sitting up in bed; states he is feeling slightly better. Has been up to the bathroom without great difficulty. No chest pain minimal shortness of breath. Currently on 8 L nasal cannula high flow. Numerous questions regarding his wife who is sick at home likely with RSV also and he would like my recommendations. She had temperature slightly over 100; no shortness of breath no other significant symptoms; I suggest symptomatic treatment only for her. He states she has no significant health problems.   He is still having some pain left side of his throat. Highest temperature last 24 hours 99.1, currently 98. 6. Respirations 18 pulse 95 blood pressure 134/77. SPO2 93 on 8 L nasal cannula high flow. Intake and output +360 cc. BNP was apparently not drawn today. Lactic acid improving at 2.2. Protein 5.9 magnesium 1.9. CRP improving, 1.4 still elevated. Hepatic function panel normal.  WBC 8.9 hemoglobin 15.5. Iron low at 51 TIBC low at 15. Ferritin normal at 151 TIBC normal 989 B58 folic acid normal.  ESR 2. Urine culture growth not present incubation continues blood cultures negative to date. Respiratory culture pending. Legionella strep antigens presumptive negative. Pulmonary consult from yesterday appreciated. Continue steroids taper as tolerated continue azithromycin for chronic MAC. Continue nebulizers. 11/21/2022-sitting up in bed, no chest pain. Has been up walking in the room without great difficulty or shortness of breath. Appetite fair; still has some left sided throat soreness. Afebrile last 24 hours. Blood pressure 129/75. SPO2 95 on 6 L nasal cannula. Intake and output +360 cc, likely inaccurate has not changed. BUN 15 creatinine 0.5 lactic acid improving 2.0. Fasting glucose 143. CRP much improved 0.6. WBC 7.2 hemoglobin 15.1. ESR 1. Pulmonary note from yesterday, feeling some better. Ambulating a little bit in the room. Continue azithromycin for MAC out of bed to chair. Pulmonary note from today, claims to be feeling some better still some sore throat. Saturating 95% with high flow nasal cannula 8 L/min. Taper steroids reduce oxygen to 6 L/min. Therapy note from today, patient independent no need for therapy. 11/22/2022-sitting up in bed; states he feels fine hoping for discharge. He feels he needs to get home to help his wife who is \"very sick\". She still having fevers; never did get a formal diagnosis.   I advised him that if she has infection other than RSV, exposing him to her might cause him to contract same illness. He still feels he needs to discharge. I spoke with pulmonary who thinks he is stable for discharge. Afebrile last 24 hours. Blood pressure 116/86. SPO2 95 on 4 L nasal cannula. Creatinine 0.5 lactic acid 2.0 fasting glucose 120. CRP 0.3 now normal.  Liver functions normal.  Hemoglobin 16.2 WBC 7.4. Pulmonary note from today, patient hoping to be discharged feels he needs to get home and help his wife who is ill. Consider transition of steroids to oral.  Continue aerosols. SPO2 done with ambulation as follows--         Tone Jonas, RN    Registered Nurse         Progress Notes        Signed    Date of Service:  11/22/2022 11:43 AM                              Signed                     Pulse ox  4 liters sitting 91%  Pulse ox 4 liters 86% ambulating  Pulse ox 4.5 liters ambulating 89-90%  Pulse ox 4.5 liters sitting 93%                2D echo completed with following results--      Summary   Left ventricle grossly normal in size. Normal LV segmental wall motion. Normal left ventricular wall thickness. Estimated left ventricular ejection fraction is 62±5%. Estimated wedge pressure is 14 mmHg as per [place] formula. Does not meet 50% diagnostic criteria for diastolic dysfunction. Mildly dilated right ventricle. TAPSE is normal   Physiologic and/or trace mitral regurgitation is present. Physiologic and/or trace tricuspid regurgitation. Unable to accurately assess RV systolic pressure. Physiologic and/or trace pulmonic regurgitation present. Technically good quality study. Compared to prior echo, no significant changes noted. Suggest clinical correlation. Hospital orders:        PLAN:  Medications discussed with patient  GI prophylaxis  DVT prophylaxis  Pulmonary has been consulted  Azithromycin by mouth 500 mg every other day for MAC  Tessalon Perles 200 mg 3 times daily  Cepacol lozenges every 2 hours as needed  Arformoterol 15 mcg twice daily  Budesonide 250 mcg twice daily  Ceftriaxone 1 g IV every 24 hours will be discontinued since RSV has been found  Enoxaparin 40 mg subcu daily  DuoNeb every 4 hours  He received methylprednisolone 125 mg once in the ED; continue methylprednisolone 60 mg IV every 8 hours  IV sodium chloride 0.9% 50 cc an hour  Percocet 5, 2 times daily as needed for pain  Potassium replacement protocol  Blood and urine cultures  Procalcitonin  Legionella and strep antigens  Repeat 2D echo since he did have heart failure preserved ejection fraction earlier this year  Troponins      RE  CHANGES AND FINDINGS   Medications reviewed with patient  GI prophylaxis  DVT prophylaxis  Pulmonary has been consulted  Restart ceftriaxone 1 g every 24 hours in view of elevated procalcitonin  Arformoterol 15 mcg twice daily  Azithromycin 500 mg by mouth every other day, MAC  Tessalon 200 mg 3 times daily  Budesonide 500 mcg twice daily  DuoNeb every 4 hours  Enoxaparin 40 mg daily  Methylprednisolone 60 mg IV every 6 hours  Protonix 40 mg daily  Guaifenesin with codeine 5 cc every 4 hours as needed for cough  Percocet 5, 1 tablet twice daily as needed for pain  Potassium replacement protocol  11/20/2022  Nystatin oral suspension 500,000 units 4 times daily  Magic/miracle mouthwash  Azithromycin 500 mg every 48 hours  Arformoterol 15 mcg twice daily  Budesonide 500 mcg twice daily  Ceftriaxone 1 g IV every 24 hours in view of elevated prolactin  Enoxaparin 40 mg subcu daily  Ferrous sulfate 325 mg by mouth twice daily  DuoNeb every 4 hours while awake  Methylprednisolone 60 mg IV every 6 hours  Continue Cepacol spray as needed  11/21/2022  Azithromycin 500 mg every 48 hours  Budesonide 500 mcg twice daily  Arformoterol 15 mcg twice daily  Ceftriaxone 1 g IV every 24 hours, elevated procalcitonin  DuoNeb every 4 hours while awake  Methylprednisolone has been decreased to 40 mg IV every 12 hours  Nystatin oral suspension 4 times daily  Magic mouthwash as needed  Percocet 5, 1 tablet twice daily as needed for pain      Instructions at discharge:    11/22/2022  Med reconciliation completed  Prescriptions written  Resume Percocet 7.5 twice daily as needed for pain  Budesonide 0.5 mg twice daily  Formoterol 20 mcg twice daily  DuoNeb every 4 hours as needed for shortness of breath  Cefdinir 300 mg twice daily  Prednisone 30 mg daily for 2 days then 20 mg daily for 2 days then 10 mg daily for 2 days then stop  Azithromycin 500 mg by mouth every 48 hours  Protonix 40 mg daily  Cepacol throat spray as needed  Nystatin oral suspension 500,000 units 4 times daily for 7 days  Tessalon Perles 200 mg 3 times daily  Lactobacillus 1 capsule daily    Condition at DISCHARGE : STABLE AND IMPROVED     Discharged to: Home    Discharge Instructions: Medications reviewed with patient    Consults:pulmonary/intensive care     Past Medical Hx :   Past Medical History:   Diagnosis Date    Acute and chronic respiratory failure with hypoxia ([facility] 10/12/2017    Acute heart failure with preserved ejection fraction ([facility] 4/8/2021    Acute respiratory disease due to severe acute respiratory syndrome coronavirus 2 (SARS-CoV-2) 01/01/2021    Acute respiratory failure with hypoxia ([address] 11/12/2018    Bullous emphysema ([address] 11/12/2018    Chronic back pain     Degeneration of umbar intervertebral disc    Colon cancer screening     COPD (chronic obstructive pulmonary disease) ([facility]     Coronavirus infection 2/2/2022    Coronavirus 229E    Cough with hemoptysis 04/23/2019    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 ([facility] 08/15/2019    First seen by ID; treatment started 9/4/2019    Emphysema lung ([address]     Glucose intolerance 06/10/2019    Hilar adenopathy 06/10/2019    Hypophosphatemia 06/10/2019    Immunocompromised ([address] 2/3/2022    Infection due to parainfluenza virus 3 06/10/2019    Iron deficiency 2/3/2022    Left upper lobe pneumonia 10/12/2017    Pleural effusion on right 10/03/2019    Pulmonary emphysema with fibrosis of lung ([facility] 11/15/2018    Pulmonary Mycobacterium avium complex (MAC) infection ([facility] 07/12/2019    BAL results finally completed this date    Rhinovirus infection 10/16/2020    Right lower lobe pneumonia 11/15/2018    Recurrent 4/23/19    RSV (acute bronchiolitis due to respiratory syncytial virus) 11/18/2022    S/P lumbar fusion 06/01/2019    Thoracic ascending aortic aneurysm 11/15/2018    Proximal ascending aorta; 3.8 cm; 11/15/18       Past Surgical Hx :  Past Surgical History:   Procedure Laterality Date    ABSCESS DRAINAGE  2006    X2 RECTAL ABSCESS    BRONCHOSCOPY N/A 06/12/2019    BRONCHOSCOPY BRUSHINGS performed by Giovanni Doyle MD at [address] [facility] [address] N/A 10/07/2019    BRONCHOSCOPY DIAGNOSTIC OR CELL [address] ONLY performed by Giovanni Doyle MD at [address] [facility] [address] N/A 04/12/2021    BRONCHOSCOPY performed by Giovanni Doyle MD at [address] [place] [address]  11/18/2013    [facility] INSERT PICC CATH, 5/> YRS  04/10/2021         LUMBAR FUSION  03/2019    [facility]       Labs:   CBC:   Lab Results   Component Value Date/Time    WBC 7.4 11/22/2022 02:50 AM    RBC 5.20 11/22/2022 02:50 AM    HGB 16.2 11/22/2022 02:50 AM    HCT 48.3 11/22/2022 02:50 AM    MCV 92.9 11/22/2022 02:50 AM    MCH 31.2 11/22/2022 02:50 AM    MCHC 33.5 11/22/2022 02:50 AM    RDW 13.5 11/22/2022 02:50 AM     11/22/2022 02:50 AM    MPV 9.9 11/22/2022 02:50 AM     CBC with Differential:    Lab Results   Component Value Date/Time    WBC 7.4 11/22/2022 02:50 AM    RBC 5.20 11/22/2022 02:50 AM    HGB 16.2 11/22/2022 02:50 AM    HCT 48.3 11/22/2022 02:50 AM     11/22/2022 02:50 AM    MCV 92.9 11/22/2022 02:50 AM    MCH 31.2 11/22/2022 02:50 AM    MCHC 33.5 11/22/2022 02:50 AM    RDW 13.5 11/22/2022 02:50 AM    NRBC 0.0 11/19/2022 10:31 AM    METASPCT 0.9 04/13/2022 07:01 AM    LYMPHOPCT 9.8 11/22/2022 02:50 AM    MONOPCT 5.8 11/22/2022 02:50 AM    MYELOPCT 0.9 04/07/2021 10:35 AM    BASOPCT 0.1 11/22/2022 02:50 AM    MONOSABS 0.43 11/22/2022 02:50 AM    LYMPHSABS 0.72 11/22/2022 02:50 AM    EOSABS 0.00 11/22/2022 02:50 AM    BASOSABS 0.01 11/22/2022 02:50 AM     Hemoglobin/Hematocrit:    Lab Results   Component Value Date/Time    HGB 16.2 11/22/2022 02:50 AM    HCT 48.3 11/22/2022 02:50 AM     CMP:    Lab Results   Component Value Date/Time     11/22/2022 02:50 AM    K 4.4 11/22/2022 02:50 AM    K 4.4 11/18/2022 11:23 AM     11/22/2022 02:50 AM    CO2 25 11/22/2022 02:50 AM    BUN 16 11/22/2022 02:50 AM    CREATININE 0.5 11/22/2022 02:50 AM    GFRAA >60 08/01/2022 03:55 PM    LABGLOM >60 11/22/2022 02:50 AM    GLUCOSE 120 11/22/2022 02:50 AM    PROT 5.9 11/22/2022 02:50 AM    LABALBU 3.6 11/22/2022 02:50 AM    CALCIUM 9.7 11/22/2022 02:50 AM    BILITOT 0.3 11/22/2022 02:50 AM    ALKPHOS 60 11/22/2022 02:50 AM    AST 22 11/22/2022 02:50 AM    ALT 15 11/22/2022 02:50 AM     BMP:    Lab Results   Component Value Date/Time     11/22/2022 02:50 AM    K 4.4 11/22/2022 02:50 AM    K 4.4 11/18/2022 11:23 AM     11/22/2022 02:50 AM    CO2 25 11/22/2022 02:50 AM    BUN 16 11/22/2022 02:50 AM    LABALBU 3.6 11/22/2022 02:50 AM    CREATININE 0.5 11/22/2022 02:50 AM    CALCIUM 9.7 11/22/2022 02:50 AM    GFRAA >60 08/01/2022 03:55 PM    LABGLOM >60 11/22/2022 02:50 AM    GLUCOSE 120 11/22/2022 02:50 AM     Hepatic Function Panel:    Lab Results   Component Value Date/Time    ALKPHOS 60 11/22/2022 02:50 AM    ALT 15 11/22/2022 02:50 AM    AST 22 11/22/2022 02:50 AM    PROT 5.9 11/22/2022 02:50 AM    BILITOT 0.3 11/22/2022 02:50 AM    BILIDIR <0.2 11/22/2022 02:50 AM    IBILI see below 11/22/2022 02:50 AM    LABALBU 3.6 11/22/2022 02:50 AM     Ionized Calcium:  No results found for: IONCA  Magnesium:    Lab Results   Component Value Date/Time    MG 2.1 11/22/2022 02:50 AM     Phosphorus:    Lab Results   Component Value Date/Time    PHOS 3.1 11/22/2022 02:50 AM     LDH:    Lab Results   Component Value Date/Time     01/02/2021 05:55 PM     Uric Acid:    Lab Results   Component Value Date/Time    LABURIC 5.6 11/19/2022 10:31 AM     PT/INR:    Lab Results   Component Value Date/Time    PROTIME 12.5 05/30/2019 11:38 AM    INR 1.1 05/30/2019 11:38 AM     Warfarin PT/INR:  No components found for: PTPATWAR, PTINRWAR  PTT:    Lab Results   Component Value Date/Time    APTT 31.4 05/30/2019 11:38 AM   [APTT}  Troponin:    Lab Results   Component Value Date/Time    TROPONINI <0.01 04/08/2021 06:10 PM     Last 3 Troponin:    Lab Results   Component Value Date/Time    TROPONINI <0.01 04/08/2021 06:10 PM    TROPONINI <0.01 04/08/2021 12:50 PM    TROPONINI <0.01 01/02/2021 05:55 PM     U/A:    Lab Results   Component Value Date/Time    COLORU Yellow 11/18/2022 04:20 PM    PROTEINU Negative 11/18/2022 04:20 PM    PHUR 5.5 11/18/2022 04:20 PM    LABCAST FEW 10/02/2019 05:32 PM    WBCUA NONE 04/07/2021 10:35 AM    RBCUA NONE 04/07/2021 10:35 AM    MUCUS Present 04/07/2021 10:35 AM    BACTERIA FEW 04/07/2021 10:35 AM    CLARITYU Clear 11/18/2022 04:20 PM    SPECGRAV >=1.030 11/18/2022 04:20 PM    LEUKOCYTESUR Negative 11/18/2022 04:20 PM    UROBILINOGEN 0.2 11/18/2022 04:20 PM    BILIRUBINUR Negative 11/18/2022 04:20 PM    BLOODU Negative 11/18/2022 04:20 PM    GLUCOSEU Negative 11/18/2022 04:20 PM     ABG:    Lab Results   Component Value Date/Time    PH 7.428 04/10/2022 04:17 PM    PCO2 29.0 04/10/2022 04:17 PM    PO2 60.5 04/10/2022 04:17 PM    HCO3 18.7 04/10/2022 04:17 PM    BE -4.1 04/10/2022 04:17 PM    O2SAT 91.7 04/10/2022 04:17 PM     HgBA1c:    Lab Results   Component Value Date/Time    LABA1C 5.3 11/19/2022 10:31 AM     FLP:    Lab Results   Component Value Date/Time    TRIG 67 11/19/2022 10:31 AM    HDL 67 11/19/2022 10:31 AM    LDLCALC 75 11/19/2022 10:31 AM    LABVLDL 13 11/19/2022 10:31 AM     TSH: Lab Results   Component Value Date/Time    TSH 0.400 11/19/2022 10:31 AM     VITAMIN B12: No components found for: B12  FOLATE:    Lab Results   Component Value Date/Time    FOLATE 8.8 11/19/2022 10:31 AM     IRON:    Lab Results   Component Value Date/Time    IRON 51 11/19/2022 10:31 AM     Iron Saturation:  No components found for: PERCENTFE  TIBC:    Lab Results   Component Value Date/Time    TIBC 337 11/19/2022 10:31 AM     FERRITIN:    Lab Results   Component Value Date/Time    FERRITIN 151 11/19/2022 10:31 AM          CBC:  Recent Labs     11/20/22  0520 11/21/22  0505 11/22/22  0250   WBC 8.9 7.2 7.4   RBC 5.13 4.90 5.20   HGB 15.5 15.1 16.2   HCT 46.9 45.3 48.3    160 177   MCV 91.4 92.4 92.9   MCH 30.2 30.8 31.2   MCHC 33.0 33.3 33.5   RDW 13.7 13.6 13.5   LYMPHOPCT 2.1* 5.5* 9.8*   MONOPCT 2.6 4.7 5.8   BASOPCT 0.1 0.0 0.1   MONOSABS 0.23 0.34 0.43   LYMPHSABS 0.19* 0.40* 0.72*   EOSABS 0.00* 0.00* 0.00*   BASOSABS 0.01 0.00 0.01          H & H :  Recent Labs     11/20/22  0520 11/21/22  0505 11/22/22  0250   HGB 15.5 15.1 16.2       TSH:No results for input(s): TSH in the last 72 hours. GLUCOSE:No results for input(s): POCGLU in the last 72 hours. CMP:  Recent Labs     11/20/22  0520 11/20/22  1335 11/21/22  0505 11/22/22  0250   NA  --  137 136 135   K  --  3.8 4.1 4.4   CL  --  102 101 101   CO2  --  23 27 25   BUN  --  13 15 16   CREATININE  --  0.6* 0.5* 0.5*   LABGLOM  --  >60 >60 >60   GLUCOSE  --  137* 143* 120*   PROT 5.9*  --  5.7* 5.9*   LABALBU 3.7  --  3.6 3.6   CALCIUM  --  9.3 9.6 9.7   BILITOT 0.3  --  0.2 0.3   ALKPHOS 61  --  55 60   AST 18  --  17 22   ALT 12  --  13 15         BNP:No results found for: BNP    PROTIME/INR:No results for input(s): PROTIME, INR in the last 72 hours.     CRP:   Recent Labs     11/20/22  0520 11/21/22  0505 11/22/22  0250   CRP 1.4* 0.6* 0.3       ESR:  Recent Labs     11/20/22  0520 11/21/22  0505 11/22/22  0920   SEDRATE 0 1 1       LIPASE , AMYLASE:  Lab Results   Component Value Date    LIPASE 17 03/14/2022      No results found for: AMYLASE    ABGs: No results found for: PHART, PO2ART, QSO3GYS    CARDIAC: No results for input(s): CKTOTAL, CKMB, CKMBINDEX, TROPONINI in the last 72 hours. Lipid Profile:   Lab Results   Component Value Date/Time    TRIG 67 11/19/2022 10:31 AM    HDL 67 11/19/2022 10:31 AM    LDLCALC 75 11/19/2022 10:31 AM    CHOL 155 11/19/2022 10:31 AM        XR CHEST PORTABLE    Result Date: 11/18/2022  EXAMINATION: ONE XRAY VIEW OF THE CHEST 11/18/2022 10:15 am COMPARISON: 10 April 2022 HISTORY: ORDERING SYSTEM PROVIDED HISTORY: Shortness of Breath TECHNOLOGIST PROVIDED HISTORY: Reason for exam:->Shortness of Breath FINDINGS: Stable pulmonary emphysema and fibrosis with likely pulmonary arterial hypertension. Stable right-sided pleural thickening. Unchanged emphysema, fibrosis, likely pulmonary arterial hypertension and right-sided pleural thickening.        Discharge Exam:  See progress note from today    Current Discharge Medication List        START taking these medications    Details   predniSONE (DELTASONE) 10 MG tablet 3 tabs daily x2 days, 2 tabs daily x2 days, 1 tab daily x2 days then stop  Qty: 12 tablet, Refills: 0      cefdinir (OMNICEF) 300 MG capsule Take 1 capsule by mouth 2 times daily for 5 days  Qty: 10 capsule, Refills: 0      lactobacillus (CULTURELLE) CAPS capsule Take 1 capsule by mouth daily  Qty: 30 capsule, Refills: 1      benzonatate (TESSALON) 200 MG capsule Take 1 capsule by mouth 3 times daily for 7 days  Qty: 21 capsule, Refills: 0      nystatin (MYCOSTATIN) 811610 UNIT/ML suspension Take 5 mLs by mouth 4 times daily  Qty: 473 mL, Refills: 1      phenol 1.4 % LIQD mouth spray Take 1 spray by mouth every 2 hours as needed for Sore Throat  Qty: 1 mL, Refills: 0           CONTINUE these medications which have NOT CHANGED    Details   ipratropium-albuterol (DUONEB) 0.5-2.5 (3) MG/3ML SOLN nebulizer solution Inhale 1 vial into the lungs every 4 hours as needed for Shortness of Breath      pantoprazole (PROTONIX) 40 MG tablet Take 40 mg by mouth daily as needed (EPIGASTRIC DISCOMFORT)      budesonide (PULMICORT) 0.5 MG/2ML nebulizer suspension Take 1 ampule by nebulization 2 times daily      OXYGEN Inhale 4-4.5 L/min into the lungs continuous      azithromycin (ZITHROMAX) 250 MG tablet Take 500 mg by mouth every other day Indications: Mild Upper Respiratory Infection      formoterol (PERFOROMIST) 20 MCG/2ML nebulizer solution Take 2 mLs by nebulization 2 times daily  Qty: 120 mL, Refills: 5      oxyCODONE-acetaminophen (PERCOCET) 7.5-325 MG per tablet Take 1 tablet by mouth 2 times daily as needed for Pain. STOP taking these medications       ferrous sulfate (IRON 325) 325 (65 Fe) MG tablet Comments:   Reason for Stopping:               Time Spent on discharge is more than 30 minutes --      TIME  INCLUDES TIME THAT WAS  SPENT WITH DISCHARGE PAPERS, MEDICATION REVIEW, MEDICATION RECONCILIATION,   PRESCRIPTIONS, CHART REVIEW, PATIENT EXAM , FINAL PROGRESS NOTE, DISCUSSION OF FINDINGS WITH PATIENT AND AVAILABLE FAMILY , AND DICTATION  WHERE NEEDED ; Home Health Care [facility]) FORMS COMPLETED ; N-17  COMPLETION ;  H&P UPDATED ; DURABLE EQUIPMENT FORMS.      Active Hospital Problems    Diagnosis     COPD exacerbation (HCC) [J44.1]      Priority: Medium    Acute respiratory failure with hypoxia (HCC) [J96.01]      Priority: Medium    RSV (acute bronchiolitis due to respiratory syncytial virus) [J21.0]      Priority: Medium    Immunocompromised ([address] [D84.9]     Iron deficiency [E61.1]     Chronic pain syndrome [G89.4]     Chronic respiratory failure with hypoxia (HCC) [J96.11]     Pulmonary Mycobacterium avium complex (MAC) infection ([address] [A31.0]     Glucose intolerance [E74.39]     Thoracic ascending aortic aneurysm [I71.21]     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

## 2022-11-22 NOTE — PROGRESS NOTES
PROGRESS  NOTE --                                                          INTERNAL  MEDICINE                                                                              I  PERSONALLY SAW , EXAMINED, AND CARED [address], 11/22/2022     LABS, XRAY ,CHART, AND MEDICATIONS  REVIEWED BY ME       Chief complaint:   Increasing shortness of breath      11/19/2022-SUBJECTIVE: Sp Mccann is alert awake and cooperative; oriented ×3. Denies any chest pain nausea emesis. Tolerating diet. No abdominal pain. Minimal shortness of breath with exertion. States he had productive cough with sputum production this a.m. specimen was sent. Still having left-sided throat discomfort. Afebrile last 24 hours. Pulse 92 blood pressure 101/60. SPO2 94 on 8 L nasal cannula. Intake and output not available. Ionized calcium was 1.35. Lactic acid elevated 3.0. Phosphorus low at 2.4. Procalcitonin 0.18. CRP from yesterday 0.9.  proBNP from yesterday 206. WBC today 8.8 hemoglobin 17.1. Legionella and strep antigens presumptive negative. Urine culture in process blood cultures in process. CEA normal at 3.3 ASO normal at 25.      11/20/2022-patient sitting up in bed; states he is feeling slightly better. Has been up to the bathroom without great difficulty. No chest pain minimal shortness of breath. Currently on 8 L nasal cannula high flow. Numerous questions regarding his wife who is sick at home likely with RSV also and he would like my recommendations. She had temperature slightly over 100; no shortness of breath no other significant symptoms; I suggest symptomatic treatment only for her. He states she has no significant health problems. He is still having some pain left side of his throat. Highest temperature last 24 hours 99.1, currently 98. 6. Respirations 18 pulse 95 blood pressure 134/77. SPO2 93 on 8 L nasal cannula high flow. Intake and output +360 cc. BNP was apparently not drawn today. Lactic acid improving at 2.2. Protein 5.9 magnesium 1.9. CRP improving, 1.4 still elevated. Hepatic function panel normal.  WBC 8.9 hemoglobin 15.5. Iron low at 51 TIBC low at 15. Ferritin normal at 151 TIBC normal 112 N68 folic acid normal.  ESR 2. Urine culture growth not present incubation continues blood cultures negative to date. Respiratory culture pending. Legionella strep antigens presumptive negative. Pulmonary consult from yesterday appreciated. Continue steroids taper as tolerated continue azithromycin for chronic MAC. Continue nebulizers. 11/21/2022-sitting up in bed, no chest pain. Has been up walking in the room without great difficulty or shortness of breath. Appetite fair; still has some left sided throat soreness. Afebrile last 24 hours. Blood pressure 129/75. SPO2 95 on 6 L nasal cannula. Intake and output +360 cc, likely inaccurate has not changed. BUN 15 creatinine 0.5 lactic acid improving 2.0. Fasting glucose 143. CRP much improved 0.6. WBC 7.2 hemoglobin 15.1. ESR 1. Pulmonary note from yesterday, feeling some better. Ambulating a little bit in the room. Continue azithromycin for MAC out of bed to chair. Pulmonary note from today, claims to be feeling some better still some sore throat. Saturating 95% with high flow nasal cannula 8 L/min. Taper steroids reduce oxygen to 6 L/min. Therapy note from today, patient independent no need for therapy. 11/22/2022-sitting up in bed; states he feels fine hoping for discharge. He feels he needs to get home to help his wife who is \"very sick\". She still having fevers; never did get a formal diagnosis. I advised him that if she has infection other than RSV, exposing him to her might cause him to contract same illness. He still feels he needs to discharge. I spoke with pulmonary who thinks he is stable for discharge. Afebrile last 24 hours.   Blood pressure 116/86. SPO2 95 on 4 L nasal cannula. Creatinine 0.5 lactic acid 2.0 fasting glucose 120. CRP 0.3 now normal.  Liver functions normal.  Hemoglobin 16.2 WBC 7.4. Pulmonary note from today, patient hoping to be discharged feels he needs to get home and help his wife who is ill. Consider transition of steroids to oral.  Continue aerosols.   SPO2 done with ambulation as follows--    Jay Faustin RN   Registered Nurse      Progress Notes       Signed   Date of Service:  11/22/2022 11:43 AM                 Signed                Pulse ox  4 liters sitting 91%  Pulse ox 4 liters 86% ambulating  Pulse ox 4.5 liters ambulating 89-90%  Pulse ox 4.5 liters sitting 93%                  Objective:     PHYSICAL EXAM:    VS: /86   Pulse 81   Temp 97.8 °F (36.6 °C) (Oral)   Resp 20   Ht 6' 1\" (1.854 m)   Wt 192 lb 4.8 oz (87.2 kg)   SpO2 95%   BMI 25.37 kg/m²     Labs:   CBC:   Lab Results   Component Value Date/Time    WBC 7.4 11/22/2022 02:50 AM    RBC 5.20 11/22/2022 02:50 AM    HGB 16.2 11/22/2022 02:50 AM    HCT 48.3 11/22/2022 02:50 AM    MCV 92.9 11/22/2022 02:50 AM    MCH 31.2 11/22/2022 02:50 AM    MCHC 33.5 11/22/2022 02:50 AM    RDW 13.5 11/22/2022 02:50 AM     11/22/2022 02:50 AM    MPV 9.9 11/22/2022 02:50 AM     CBC with Differential:    Lab Results   Component Value Date/Time    WBC 7.4 11/22/2022 02:50 AM    RBC 5.20 11/22/2022 02:50 AM    HGB 16.2 11/22/2022 02:50 AM    HCT 48.3 11/22/2022 02:50 AM     11/22/2022 02:50 AM    MCV 92.9 11/22/2022 02:50 AM    MCH 31.2 11/22/2022 02:50 AM    MCHC 33.5 11/22/2022 02:50 AM    RDW 13.5 11/22/2022 02:50 AM    NRBC 0.0 11/19/2022 10:31 AM    METASPCT 0.9 04/13/2022 07:01 AM    LYMPHOPCT 9.8 11/22/2022 02:50 AM    MONOPCT 5.8 11/22/2022 02:50 AM    MYELOPCT 0.9 04/07/2021 10:35 AM    BASOPCT 0.1 11/22/2022 02:50 AM    MONOSABS 0.43 11/22/2022 02:50 AM    LYMPHSABS 0.72 11/22/2022 02:50 AM    EOSABS 0.00 11/22/2022 02:50 AM BASOSABS 0.01 11/22/2022 02:50 AM     Hemoglobin/Hematocrit:    Lab Results   Component Value Date/Time    HGB 16.2 11/22/2022 02:50 AM    HCT 48.3 11/22/2022 02:50 AM     CMP:    Lab Results   Component Value Date/Time     11/22/2022 02:50 AM    K 4.4 11/22/2022 02:50 AM    K 4.4 11/18/2022 11:23 AM     11/22/2022 02:50 AM    CO2 25 11/22/2022 02:50 AM    BUN 16 11/22/2022 02:50 AM    CREATININE 0.5 11/22/2022 02:50 AM    GFRAA >60 08/01/2022 03:55 PM    LABGLOM >60 11/22/2022 02:50 AM    GLUCOSE 120 11/22/2022 02:50 AM    PROT 5.9 11/22/2022 02:50 AM    LABALBU 3.6 11/22/2022 02:50 AM    CALCIUM 9.7 11/22/2022 02:50 AM    BILITOT 0.3 11/22/2022 02:50 AM    ALKPHOS 60 11/22/2022 02:50 AM    AST 22 11/22/2022 02:50 AM    ALT 15 11/22/2022 02:50 AM     BMP:    Lab Results   Component Value Date/Time     11/22/2022 02:50 AM    K 4.4 11/22/2022 02:50 AM    K 4.4 11/18/2022 11:23 AM     11/22/2022 02:50 AM    CO2 25 11/22/2022 02:50 AM    BUN 16 11/22/2022 02:50 AM    LABALBU 3.6 11/22/2022 02:50 AM    CREATININE 0.5 11/22/2022 02:50 AM    CALCIUM 9.7 11/22/2022 02:50 AM    GFRAA >60 08/01/2022 03:55 PM    LABGLOM >60 11/22/2022 02:50 AM    GLUCOSE 120 11/22/2022 02:50 AM     Hepatic Function Panel:    Lab Results   Component Value Date/Time    ALKPHOS 60 11/22/2022 02:50 AM    ALT 15 11/22/2022 02:50 AM    AST 22 11/22/2022 02:50 AM    PROT 5.9 11/22/2022 02:50 AM    BILITOT 0.3 11/22/2022 02:50 AM    BILIDIR <0.2 11/22/2022 02:50 AM    IBILI see below 11/22/2022 02:50 AM    LABALBU 3.6 11/22/2022 02:50 AM     Ionized Calcium:  No results found for: IONCA  Magnesium:    Lab Results   Component Value Date/Time    MG 2.1 11/22/2022 02:50 AM     Phosphorus:    Lab Results   Component Value Date/Time    PHOS 3.1 11/22/2022 02:50 AM     LDH:    Lab Results   Component Value Date/Time     01/02/2021 05:55 PM     Uric Acid:    Lab Results   Component Value Date/Time    LABURIC 5.6 11/19/2022 10:31 AM     PT/INR:    Lab Results   Component Value Date/Time    PROTIME 12.5 05/30/2019 11:38 AM    INR 1.1 05/30/2019 11:38 AM     Warfarin PT/INR:  No components found for: PTPATWAR, PTINRWAR  PTT:    Lab Results   Component Value Date/Time    APTT 31.4 05/30/2019 11:38 AM   [APTT}  Troponin:    Lab Results   Component Value Date/Time    TROPONINI <0.01 04/08/2021 06:10 PM     Last 3 Troponin:    Lab Results   Component Value Date/Time    TROPONINI <0.01 04/08/2021 06:10 PM    TROPONINI <0.01 04/08/2021 12:50 PM    TROPONINI <0.01 01/02/2021 05:55 PM     U/A:    Lab Results   Component Value Date/Time    COLORU Yellow 11/18/2022 04:20 PM    PROTEINU Negative 11/18/2022 04:20 PM    PHUR 5.5 11/18/2022 04:20 PM    LABCAST FEW 10/02/2019 05:32 PM    WBCUA NONE 04/07/2021 10:35 AM    RBCUA NONE 04/07/2021 10:35 AM    MUCUS Present 04/07/2021 10:35 AM    BACTERIA FEW 04/07/2021 10:35 AM    CLARITYU Clear 11/18/2022 04:20 PM    SPECGRAV >=1.030 11/18/2022 04:20 PM    LEUKOCYTESUR Negative 11/18/2022 04:20 PM    UROBILINOGEN 0.2 11/18/2022 04:20 PM    BILIRUBINUR Negative 11/18/2022 04:20 PM    BLOODU Negative 11/18/2022 04:20 PM    GLUCOSEU Negative 11/18/2022 04:20 PM     HgBA1c:    Lab Results   Component Value Date/Time    LABA1C 5.3 11/19/2022 10:31 AM     FLP:    Lab Results   Component Value Date/Time    TRIG 67 11/19/2022 10:31 AM    HDL 67 11/19/2022 10:31 AM    LDLCALC 75 11/19/2022 10:31 AM    LABVLDL 13 11/19/2022 10:31 AM     TSH:    Lab Results   Component Value Date/Time    TSH 0.400 11/19/2022 10:31 AM     VITAMIN B12: No components found for: B12  FOLATE:    Lab Results   Component Value Date/Time    FOLATE 8.8 11/19/2022 10:31 AM     IRON:    Lab Results   Component Value Date/Time    IRON 51 11/19/2022 10:31 AM     Iron Saturation:  No components found for: PERCENTFE  TIBC:    Lab Results   Component Value Date/Time    TIBC 337 11/19/2022 10:31 AM     FERRITIN:    Lab Results   Component Value Date/Time    FERRITIN 151 11/19/2022 10:31 AM        General appearance: Alert, Awake, Oriented times 3, no distress; nasal cannula oxygen in place  Skin: Warm and dry ; no rashes  Head: Normocephalic. No masses, lesions or tenderness noted  Eyes: Conjunctivae pink, sclera white. PERRL,EOM-I  Ears: External ears normal  Nose/Sinuses: Nares normal. Septum midline. Mucosa normal. No drainage  Oropharynx: Oropharynx clear, I cannot see any Candida lesions  Neck: Supple. No jugular venous distension, lymphadenopathy or thyromegaly Trachea midline; mildly tender to palpation left side of neck  Lungs: Mostly clear rare wheeze  Heart: S1 S2  Regular rate and rhythm. No rub, gallop, murmur  Abdomen: Soft, non-tender. BS normal. No masses, organomegaly; no rebound or guarding  Extremities: No edema, Peripheral pulses palpable  Musculoskeletal: Muscular strength appears intact. Neuro:  No focal motor defects ; II-XII grossly intact . GLASER equally    TELEMETRY: REVIEWED--Telemetry: Sinus    ASSESSMENT:   Principal Problem:    COPD exacerbation (HCC)  Active Problems:    Acute respiratory failure with hypoxia (HCC)    RSV (acute bronchiolitis due to respiratory syncytial virus)    COPD (chronic obstructive pulmonary disease) (HCC)    Bullous emphysema (HCC)    Thoracic ascending aortic aneurysm    Glucose intolerance    Pulmonary Mycobacterium avium complex (MAC) infection (HCC)    Chronic respiratory failure with hypoxia (HCC)    Chronic pain syndrome    Immunocompromised (HCC)    Iron deficiency  Resolved Problems:    * No resolved hospital problems.  *      PLAN:  SEE ORDERS      RE  CHANGES AND FINDINGS   Medications reviewed with patient  GI prophylaxis  DVT prophylaxis  Pulmonary has been consulted  Restart ceftriaxone 1 g every 24 hours in view of elevated procalcitonin  Arformoterol 15 mcg twice daily  Azithromycin 500 mg by mouth every other day, MAC  Tessalon 200 mg 3 times daily  Budesonide 500 mcg twice daily  DuoNeb every 4 hours  Enoxaparin 40 mg daily  Methylprednisolone 60 mg IV every 6 hours  Protonix 40 mg daily  Guaifenesin with codeine 5 cc every 4 hours as needed for cough  Percocet 5, 1 tablet twice daily as needed for pain  Potassium replacement protocol  11/20/2022  Nystatin oral suspension 500,000 units 4 times daily  Magic/miracle mouthwash  Azithromycin 500 mg every 48 hours  Arformoterol 15 mcg twice daily  Budesonide 500 mcg twice daily  Ceftriaxone 1 g IV every 24 hours in view of elevated prolactin  Enoxaparin 40 mg subcu daily  Ferrous sulfate 325 mg by mouth twice daily  DuoNeb every 4 hours while awake  Methylprednisolone 60 mg IV every 6 hours  Continue Cepacol spray as needed  11/21/2022  Azithromycin 500 mg every 48 hours  Budesonide 500 mcg twice daily  Arformoterol 15 mcg twice daily  Ceftriaxone 1 g IV every 24 hours, elevated procalcitonin  DuoNeb every 4 hours while awake  Methylprednisolone has been decreased to 40 mg IV every 12 hours  Nystatin oral suspension 4 times daily  Magic mouthwash as needed  Percocet 5, 1 tablet twice daily as needed for pain  11/22/2022  Med reconciliation completed  Prescriptions written  Resume Percocet 7.5 twice daily as needed for pain  Budesonide 0.5 mg twice daily  Formoterol 20 mcg twice daily  DuoNeb every 4 hours as needed for shortness of breath  Cefdinir 300 mg twice daily  Prednisone 30 mg daily for 2 days then 20 mg daily for 2 days then 10 mg daily for 2 days then stop  Azithromycin 500 mg by mouth every 48 hours  Protonix 40 mg daily  Cepacol throat spray as needed  Nystatin oral suspension 500,000 units 4 times daily for 7 days  Tessalon Perles 200 mg 3 times daily  Lactobacillus 1 capsule daily    Discussed with patient and nursing. Partha Schmidt DO     12:22 PM     11/22/2022    TIME > 35 MINUTES    >  50 %  OF  TIME  DISCUSSION               ------------  INFORMATION  -----------      DIET:ADULT DIET;  Regular      No Known Allergies      MEDICATION SIDE EFFECTS:none       SCHEDULED MEDS:                                 Scheduled Meds:   methylPREDNISolone  40 mg IntraVENous Q12H    nystatin  5 mL Oral 4x Daily    budesonide  500 mcg Nebulization BID    cefTRIAXone (ROCEPHIN) IV  1,000 mg IntraVENous Q24H    ferrous sulfate  325 mg Oral BID WC    pantoprazole  40 mg Oral QAM AC    Arformoterol Tartrate  15 mcg Nebulization BID    benzonatate  200 mg Oral TID    enoxaparin  40 mg SubCUTAneous Daily    azithromycin  500 mg Oral Every Other Day    lactobacillus  1 capsule Oral Daily    ipratropium-albuterol  1 ampule Inhalation Q4H WA       Continuous Infusions:   sodium chloride 50 mL/hr at 11/21/22 1253         Data:     No intake or output data in the 24 hours ending 11/22/22 1222    Wt Readings from Last 3 Encounters:   11/22/22 192 lb 4.8 oz (87.2 kg)   11/17/22 195 lb (88.5 kg)   10/27/22 192 lb (87.1 kg)       Labs: Additional    GLUCOSE:No results for input(s): POCGLU in the last 72 hours. BNP:No results found for: BNP    CRP:   Recent Labs     11/20/22  0520 11/21/22  0505 11/22/22  0250   CRP 1.4* 0.6* 0.3       ESR:  Recent Labs     11/20/22  0520 11/21/22  0505   SEDRATE 0 1       RADIOLOGY: REVIEWED AVAILABLE REPORT  XR CHEST PORTABLE   Final Result   Unchanged emphysema, fibrosis, likely pulmonary arterial hypertension and   right-sided pleural thickening.                    [address]    12:22 PM     11/22/2022      Voice recognition software used for dictation

## 2022-11-22 NOTE — PLAN OF CARE
Problem: Discharge Planning  Goal: Discharge to home or other facility with appropriate resources  11/21/2022 2225 by Lurdes Crouch RN  Outcome: Progressing  11/21/2022 1829 by Rosalie Chris RN  Outcome: Progressing     Problem: Pain  Goal: Verbalizes/displays adequate comfort level or baseline comfort level  Outcome: Progressing

## 2022-11-22 NOTE — CARE COORDINATION
+ RSV. Requiring O2 6lNC- wears home O2 3.5 liters provided by [facility]-will need O2 testing/ order if unalbe to wean to 3.5 liters on discharge. Continues on iv/po abx, iv steroid. PT/OT independent.  Plan remains to return home w/ his wife on discharge- states he drove himself to the hospital . Will follow Marivel Mcgrath RN case manager

## 2022-11-23 ENCOUNTER — CARE COORDINATION (OUTPATIENT)
Dept: CASE MANAGEMENT | Age: 72
End: 2022-11-23

## 2022-11-23 DIAGNOSIS — J44.1 COPD EXACERBATION (HCC): Primary | ICD-10-CM

## 2022-11-23 LAB
BLOOD CULTURE, ROUTINE: NORMAL
CULTURE, BLOOD 2: NORMAL

## 2022-11-23 PROCEDURE — 1111F DSCHRG MED/CURRENT MED MERGE: CPT | Performed by: INTERNAL MEDICINE

## 2022-11-23 NOTE — CARE COORDINATION
[facility] Care Transitions Initial Follow Up Call    Call within 2 business days of discharge: Yes    Care Transition Nurse contacted the patient by telephone to perform post hospital discharge assessment. Verified name and  with patient as identifiers. Provided introduction to self, and explanation of the Care Transition Nurse role. Patient: Vaughn Sheriff Patient : 1950   MRN: [id number]  Reason for Admission: COPD exacerbation/RSV/COVID+  Discharge Date: 22 RARS: Readmission Risk Score: 14.1      Last Discharge  Street       Date Complaint Diagnosis Description Type Department Provider    22 Shortness of Breath; Chills Acute respiratory failure with hypoxia ([address] . .. ED to Hosp-Admission (Discharged) (ADMITTED) ANNY Jaimes DO; Kiarra Weldon. .. Was this an external facility discharge? No Discharge Facility: [facility]    Challenges to be reviewed by the provider   Additional needs identified to be addressed with provider: No  none               Method of communication with provider: none. Spoke with patient today 22 for initial TCM/hospital discharge follow up. Patient states he is feeling better since discharge but admits he continues feeling weak in legs and tired after being in hospital for a few days. States he is ambulating independently with DME. States having shortness of breath with exertion which is improving. Noted CXR obtained on admission showed the following below:     Unchanged emphysema, fibrosis, likely pulmonary arterial hypertension and   right-sided pleural thickening. Patient states he is wearing home oxygen t 4.5 lpm continuous. Denies being a current smoker advising he quit in 2016. States having a an associated dry cough. Patient denies any chest pain, chest discomfort, abdominal pain, nausea, vomiting, chills or fever. Noted patient respiratory panel was positive for RSV and Coronavirus on 22.      Provided a complete review of home meds with patient who confirms he obtained all new meds except probiotic which he will  OTC. Advised to take antibiotics and Prednisone as directed until completely finished. 1111F code entered. Discussed COPD/COVID zone tools and knowing when to seek medical attention. Confirmed with patient he will call PCP office today to schedule f/u appt. CTN will route to [facility] advising of discharge and the need to schedule HFU appt with Dr. Slime Meyer (PCP) as next ov isn't until 12/20/22. Patient denies any other complaints or concerns at this time. CTN will continue to follow for Care Transition. Care Transition Nurse reviewed discharge instructions, medical action plan, and red flags with patient who verbalized understanding. The patient was given an opportunity to ask questions and does not have any further questions or concerns at this time. Were discharge instructions available to patient? Yes. Reviewed appropriate site of care based on symptoms and resources available to patient including: PCP  Urgent care clinics  When to call 911  Condition related references. The patient agrees to contact the PCP office for questions related to their healthcare. Advance Care Planning:   Does patient have an Advance Directive: reviewed and current. Medication reconciliation was performed with patient, who verbalizes understanding of administration of home medications. Medications reviewed, 1111F entered: yes    Was patient discharged with a pulse oximeter? no    Non-face-to-face services provided:  Scheduled appointment with PCP-CTN confirmed with patient he will call PCP office today to schedule f/u appt. CTN will route to [facility] advising of discharge and the need to schedule HFU appt with Dr. Slime Meyer as next ov isn't until 12/20/22.    Obtained and reviewed discharge summary and/or continuity of care documents  Education of patient/family/caregiver/guardian to support self-management-Discussed COPD/COVID zone tools knowing when to seek medical attention. Assessment and support for treatment adherence and medication management-Advised to take antibitoics/Prednisone as directed until completely finished. Offered patient enrollment in the Remote Patient Monitoring (RPM) program for in-home monitoring:  Did not offer on initial call . Care Transitions 24 Hour Call    Schedule Follow Up Appointment with PCP: Declined  Do you have a copy of your discharge instructions?: Yes  Do you have all of your prescriptions and are they filled?: No  Have you been contacted by a [facility] Pharmacist?: No  Have you scheduled your follow up appointment?: No  Post Acute Services: Outpatient/Community Services (Comment: 1/9/21-oxygen through [facility] [place])  Do you have support at home?: Partner/Spouse/SO  Do you feel like you have everything you need to keep you well at home?: No  Care Transitions Interventions         Follow Up  Future Appointments   Date Time Provider Tan Miranda   12/20/2022  1:30 PM Rachel New MD TRAIL [facility]   2/1/2023  2:30 PM DO [facility]   2/2/2023  9:30 AM Amira Blas MD AFLNEOHINFBD AFL [facility] INF   4/6/2023  1:30 PM MIGUE Spear - NP AFL PULM CC AFL PULM CC       Care Transition Nurse provided contact information. Plan for follow-up call in 5-7 days based on severity of symptoms and risk factors. Plan for next call: symptom management-Has weakness, SOB, cough improved?  follow-up appointment-Did patient get scheduled for HFU appt with Dr. Ricardo Mendoza (PCP)?     MIGUE Savage

## 2022-11-29 ENCOUNTER — OFFICE VISIT (OUTPATIENT)
Dept: PRIMARY CARE CLINIC | Age: 72
End: 2022-11-29

## 2022-11-29 VITALS
BODY MASS INDEX: 25.45 KG/M2 | OXYGEN SATURATION: 90 % | WEIGHT: 192 LBS | RESPIRATION RATE: 18 BRPM | HEART RATE: 84 BPM | SYSTOLIC BLOOD PRESSURE: 90 MMHG | DIASTOLIC BLOOD PRESSURE: 70 MMHG | TEMPERATURE: 97.7 F | HEIGHT: 73 IN

## 2022-11-29 DIAGNOSIS — J21.0 RSV (ACUTE BRONCHIOLITIS DUE TO RESPIRATORY SYNCYTIAL VIRUS): Primary | ICD-10-CM

## 2022-11-29 DIAGNOSIS — J96.11 CHRONIC RESPIRATORY FAILURE WITH HYPOXIA (HCC): ICD-10-CM

## 2022-11-29 DIAGNOSIS — J44.9 CHRONIC OBSTRUCTIVE PULMONARY DISEASE, UNSPECIFIED COPD TYPE (HCC): ICD-10-CM

## 2022-11-29 DIAGNOSIS — J44.1 ACUTE EXACERBATION OF CHRONIC OBSTRUCTIVE PULMONARY DISEASE (COPD) (HCC): ICD-10-CM

## 2022-11-29 DIAGNOSIS — D84.9 IMMUNOCOMPROMISED (HCC): ICD-10-CM

## 2022-11-30 ENCOUNTER — CARE COORDINATION (OUTPATIENT)
Dept: CASE MANAGEMENT | Age: 72
End: 2022-11-30

## 2022-11-30 NOTE — CARE COORDINATION
[facility] Care Transitions Follow Up Call        Patient: Buelah Heimlich  Patient : 1950   MRN: [id number]  Reason for Admission:  COPD exacerbation/RSV/COVID+  Discharge Date: 22 RARS: Readmission Risk Score: 14.1      Attempted to reach patient by phone regarding follow up; Care Transitions, Subsequent Call. Patient's spouse, Javon Post, answered the phone; patient can be heard stating he doesn't want to take the call.    -Patient is sharing on the call  -Javon Post reports the patient is still not feeling well.   -Javon Post reports the patient was seen by Dr. Jessica Henry on 2022. Javon Post reports Dr. Jessica Henry instructed the patient to follow up with his Pulmonologist.   -CTN encouraged Javon Post to have the patient f/u with Pulmonologist as soon as possible; Javon Post verbalized understanding and communicated to the patient.   -return to ED for any worsening in symptoms     Emotional support provided; discussed will continue to follow.      Vero oGldsmith RN

## 2022-12-01 ENCOUNTER — CARE COORDINATION (OUTPATIENT)
Dept: CASE MANAGEMENT | Age: 72
End: 2022-12-01

## 2022-12-01 NOTE — CARE COORDINATION
[facility] Care Transitions Follow Up Call    Care Transition Nurse contacted the patient by telephone to follow up after admission on 2022. Verified name and  with patient as identifiers. Patient: Noé Waters  Patient : 1950   MRN: [id number]  Reason for Admission:  COPD exacerbation/RSV/COVID+  Discharge Date: 22 RARS: Readmission Risk Score: 14.1      Needs to be reviewed by the provider   Additional needs identified to be addressed with provider: No           Method of communication with provider: none. Patient is pleasant in conversation and reports he is feeling better today. -reports positive for weakness, fatigue, sinus congestion, and cough; expectorating clear/yellowish mucus   -reports shortness of breath and dizziness with exertion; utilizing oxygen at 4-5 LNC, SpO2 93%   -denies fever, chills, or chest discomfort  -reports good appetite; keeping hydrated   -reports normal bladder/bowel elimination   -independent with ambulation    Emotional support provided; discussed will continue to follow. Follow Up  Future Appointments   Date Time Provider Tan Miranda   2022  1:30 PM Frederic Wyman MD TRAIL [facility]   2023  2:30 PM Lazarus Osgood, DO [place] [facility]   2023  9:30 AM Ileana Kiran MD [facility]INF[facility]   2023  1:30 PM MIGUE Jackson - NP AFL PULM CC [facility]         Discussed appropriate site of care based on symptoms and resources available to patient including: PCP  Specialist  When to call 911. The patient agrees to contact the PCP office for questions related to their healthcare. Patients top risk factors for readmission: medical condition-COPD EXACERBATION, RSV, COVID+, multiple health system providers, and polypharmacy  Interventions to address risk factors: CTN instructed patient to schedule a f/u appointment with his Pulmonologist for evaluation/treatment;  patient verbalized understanding. Care Transition Nurse provided contact information for future needs. Plan for follow-up call in 5-7 days based on severity of symptoms and risk factors.   Plan for next call: symptom management-symptoms assessment RSV, COVID, COPD  -did patient schedule a f/u appointment with Amanda Johnson RN

## 2022-12-04 PROBLEM — J44.1 COPD EXACERBATION (HCC): Status: RESOLVED | Noted: 2022-11-18 | Resolved: 2022-12-04

## 2022-12-04 PROBLEM — J43.9 BULLOUS EMPHYSEMA (HCC): Status: RESOLVED | Noted: 2018-11-12 | Resolved: 2022-12-04

## 2022-12-04 PROBLEM — J96.01 ACUTE RESPIRATORY FAILURE WITH HYPOXIA (HCC): Status: RESOLVED | Noted: 2022-11-18 | Resolved: 2022-12-04

## 2022-12-04 NOTE — PROGRESS NOTES
Vero Berman  12/4/22     Chief Complaint   Patient presents with    Cough     Head congestion ,sob ,     Fatigue     Body ache         No Known Allergies     Current Outpatient Medications   Medication Sig Dispense Refill    lactobacillus (CULTURELLE) CAPS capsule Take 1 capsule by mouth daily 30 capsule 1    nystatin (MYCOSTATIN) 926531 UNIT/ML suspension Take 5 mLs by mouth 4 times daily 473 mL 1    phenol 1.4 % LIQD mouth spray Take 1 spray by mouth every 2 hours as needed for Sore Throat 1 mL 0    ipratropium-albuterol (DUONEB) 0.5-2.5 (3) MG/3ML SOLN nebulizer solution Inhale 1 vial into the lungs every 4 hours as needed for Shortness of Breath      pantoprazole (PROTONIX) 40 MG tablet Take 40 mg by mouth daily as needed (EPIGASTRIC DISCOMFORT)      budesonide (PULMICORT) 0.5 MG/2ML nebulizer suspension Take 1 ampule by nebulization 2 times daily      OXYGEN Inhale 4-4.5 L/min into the lungs continuous      azithromycin (ZITHROMAX) 250 MG tablet Take 500 mg by mouth every other day Indications: Mild Upper Respiratory Infection      formoterol (PERFOROMIST) 20 MCG/2ML nebulizer solution Take 2 mLs by nebulization 2 times daily 120 mL 5    oxyCODONE-acetaminophen (PERCOCET) 7.5-325 MG per tablet Take 1 tablet by mouth 2 times daily as needed for Pain. predniSONE (DELTASONE) 10 MG tablet Take 3 tablets by mouth daily for 2 days, THEN 2 tablets daily for 2 days, THEN 1 tablet daily for 2 days. 12 tablet 0     No current facility-administered medications for this visit. HPI: Patient comes in for hospital follow-up visit. He was recently briefly admitted due to an exacerbation of his COPD. Respiratory panel was positive for RSV. 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   He does follow-up with infectious disease and pulmonology on a regular basis. He is still feeling somewhat weak but otherwise getting back to his baseline. He remains on continuous supplemental oxygen and his oxygen saturations run around 90%. Review of Systems: as per HPI      Physical Exam:    Vitals:    11/29/22 1303   BP: 90/70   Pulse: 84   Resp: 18   Temp: 97.7 °F (36.5 °C)   SpO2: 90%       Patient is a 81 y.o. male. Patient appears to be in no distress. He is alert and oriented and breathing comfortably on supplemental oxygen. Ambulates without assistance. HEENT: normal.  Neck supple, no adenopathy or bruits. Heart RR, no MGR. Lungs clear. Abd: normal  Ext: no edema. Peripheral pulses: normal.  No neurologic deficits noted. Lab Results   Component Value Date    WBC 7.4 11/22/2022    HGB 16.2 11/22/2022    HCT 48.3 11/22/2022     11/22/2022    CHOL 155 11/19/2022    TRIG 67 11/19/2022    HDL 67 11/19/2022    ALT 15 11/22/2022    AST 22 11/22/2022    TSH 0.400 11/19/2022    PSA 0.68 01/11/2022    INR 1.1 05/30/2019    LABA1C 5.3 11/19/2022      Lab Results   Component Value Date     11/22/2022    K 4.4 11/22/2022     11/22/2022    CO2 25 11/22/2022    BUN 16 11/22/2022    CREATININE 0.5 (L) 11/22/2022    GLUCOSE 120 (H) 11/22/2022    CALCIUM 9.7 11/22/2022    PROT 5.9 (L) 11/22/2022    LABALBU 3.6 11/22/2022    BILITOT 0.3 11/22/2022    ALKPHOS 60 11/22/2022    AST 22 11/22/2022    ALT 15 11/22/2022    LABGLOM >60 11/22/2022    GFRAA >60 08/01/2022            Assessment:      RSV (acute bronchiolitis due to respiratory syncytial virus)    Acute exacerbation of chronic obstructive pulmonary disease (COPD) ([facility] due to above and improving. Chronic obstructive pulmonary disease, unspecified COPD type [facility]    Chronic respiratory failure with hypoxia ([facility]    Immunocompromised ([facility] due to long-term use of corticosteroids.         Discussion Notes: He will continue all of his current meds and supplements the same as listed on his med list.  He will follow-up with his pulmonologist and infectious disease specialist as per their instructions. Otherwise he is due to return here in about a month for his annual wellness visit.

## 2022-12-06 ENCOUNTER — CARE COORDINATION (OUTPATIENT)
Dept: CASE MANAGEMENT | Age: 72
End: 2022-12-06

## 2022-12-06 NOTE — CARE COORDINATION
[facility] Care Transitions Follow Up Call    Patient: Hannah Condon  Patient : 1950   MRN: [id number]  Reason for Admission: COPD exacerbation/RSV/COVID  Discharge Date: 22 RARS: Readmission Risk Score: 14.1    Attempted to contact patient today 22 for TCM/hospital discharge follow up sub call. Left message requesting a return call back to CTN and provided contact information. Noted patient completed f/u appt eith Dr. Gianna Arroyo (PCP) on 22. CTN will continue to follow.     Sumi Brown APRN

## 2022-12-07 ENCOUNTER — HOSPITAL ENCOUNTER (OUTPATIENT)
Dept: GENERAL RADIOLOGY | Age: 72
Discharge: HOME | End: 2022-12-09
Payer: MEDICARE

## 2022-12-07 ENCOUNTER — HOSPITAL ENCOUNTER (OUTPATIENT)
Age: 72
Discharge: HOME | End: 2022-12-09
Payer: MEDICARE

## 2022-12-07 DIAGNOSIS — R06.02 SHORTNESS OF BREATH: ICD-10-CM

## 2022-12-07 PROCEDURE — 71046 X-RAY EXAM CHEST 2 VIEWS: CPT

## 2022-12-13 ENCOUNTER — CARE COORDINATION (OUTPATIENT)
Dept: CASE MANAGEMENT | Age: 72
End: 2022-12-13

## 2022-12-13 NOTE — CARE COORDINATION
RN writer has made multiple attempts to engage in debriefing with Michelle and take the next steps toward processing her out of seclusion.  Unfortunately, she remains agitated, as evidenced by yelling and spitting in the direction of the seclusion room door when RN writer stands in front of the locked door.  She is either unable or unwilling to process the events leading up to her seclusion event.  Will continue her seclusion for now.   [facility] Care Transitions Follow Up Call        Patient: Hannah Condon  Patient : 1950   MRN: [id number]  Reason for Admission: Acute resp failure with hypoxia  Discharge Date: 22 RARS: Readmission Risk Score: 14.1      2nd attempt to reach the patient for subsequent Care Transition call. First attempt to reach pt today and encountered a busy signal. Second attempt to call pt and phone rang multiple times with no answer. No VM available to leave a message. No further outreaches will be attempted.     CTN will  sign off and resolve CT episode        Follow Up  Future Appointments   Date Time Provider Tan Miranda   2022  1:30 PM Jasmyne Rader MD TRAIL [facility]   2023  2:30 PM DO James Mathis ENT [facility]   2023  9:30 AM Vicky Perez MD AFL[facility]INFBD [facility] [place] [facility]   2023  1:30 PM MIGUE Whittaker - NP AFL PULM CC [facility]         Sam Levy RN

## 2022-12-20 ENCOUNTER — OFFICE VISIT (OUTPATIENT)
Dept: PRIMARY CARE CLINIC | Age: 72
End: 2022-12-20
Payer: MEDICARE

## 2022-12-20 VITALS
SYSTOLIC BLOOD PRESSURE: 136 MMHG | HEIGHT: 73 IN | WEIGHT: 192 LBS | TEMPERATURE: 97 F | BODY MASS INDEX: 25.45 KG/M2 | OXYGEN SATURATION: 98 % | DIASTOLIC BLOOD PRESSURE: 82 MMHG | RESPIRATION RATE: 18 BRPM | HEART RATE: 68 BPM

## 2022-12-20 DIAGNOSIS — G89.4 CHRONIC PAIN SYNDROME: ICD-10-CM

## 2022-12-20 DIAGNOSIS — J44.9 CHRONIC OBSTRUCTIVE PULMONARY DISEASE, UNSPECIFIED COPD TYPE (HCC): ICD-10-CM

## 2022-12-20 DIAGNOSIS — G89.29 CHRONIC BILATERAL LOW BACK PAIN WITHOUT SCIATICA: ICD-10-CM

## 2022-12-20 DIAGNOSIS — Z98.1 S/P LUMBAR FUSION: ICD-10-CM

## 2022-12-20 DIAGNOSIS — J96.11 CHRONIC RESPIRATORY FAILURE WITH HYPOXIA (HCC): ICD-10-CM

## 2022-12-20 DIAGNOSIS — Z00.00 MEDICARE ANNUAL WELLNESS VISIT, SUBSEQUENT: Primary | ICD-10-CM

## 2022-12-20 DIAGNOSIS — A31.0 PULMONARY MYCOBACTERIUM AVIUM COMPLEX (MAC) INFECTION (HCC): ICD-10-CM

## 2022-12-20 DIAGNOSIS — M54.50 CHRONIC BILATERAL LOW BACK PAIN WITHOUT SCIATICA: ICD-10-CM

## 2022-12-20 DIAGNOSIS — D84.9 IMMUNOCOMPROMISED (HCC): ICD-10-CM

## 2022-12-20 DIAGNOSIS — M51.36 LUMBAR DEGENERATIVE DISC DISEASE: ICD-10-CM

## 2022-12-20 DIAGNOSIS — Z12.11 COLON CANCER SCREENING: ICD-10-CM

## 2022-12-20 PROCEDURE — G0439 PPPS, SUBSEQ VISIT: HCPCS | Performed by: INTERNAL MEDICINE

## 2022-12-20 PROCEDURE — 1123F ACP DISCUSS/DSCN MKR DOCD: CPT | Performed by: INTERNAL MEDICINE

## 2022-12-20 PROCEDURE — G8484 FLU IMMUNIZE NO ADMIN: HCPCS | Performed by: INTERNAL MEDICINE

## 2022-12-20 PROCEDURE — 3017F COLORECTAL CA SCREEN DOC REV: CPT | Performed by: INTERNAL MEDICINE

## 2022-12-20 ASSESSMENT — PATIENT HEALTH QUESTIONNAIRE - PHQ9
SUM OF ALL RESPONSES TO PHQ9 QUESTIONS 1 & 2: 0
SUM OF ALL RESPONSES TO PHQ QUESTIONS 1-9: 0
2. FEELING DOWN, DEPRESSED OR HOPELESS: 0
1. LITTLE INTEREST OR PLEASURE IN DOING THINGS: 0
SUM OF ALL RESPONSES TO PHQ QUESTIONS 1-9: 0

## 2022-12-20 ASSESSMENT — LIFESTYLE VARIABLES
HOW OFTEN DO YOU HAVE A DRINK CONTAINING ALCOHOL: NEVER
HOW MANY STANDARD DRINKS CONTAINING ALCOHOL DO YOU HAVE ON A TYPICAL DAY: PATIENT DOES NOT DRINK

## 2022-12-20 NOTE — PATIENT INSTRUCTIONS
Learning About Being Active as an Older Adult  Why is being active important as you get older? Being active is one of the best things you can do for your health. And it's never too late to start. Being active--or getting active, if you aren't already--has definite benefits. It can:  Give you more energy,  Keep your mind sharp. Improve balance to reduce your risk of falls. Help you manage chronic illness with fewer medicines. No matter how old you are, how fit you are, or what health problems you have, there is a form of activity that will work for you. And the more physical activity you can do, the better your overall health will be. What kinds of activity can help you stay healthy? Being more active will make your daily activities easier. Physical activity includes planned exercise and things you do in daily life. There are four types of activity:  Aerobic. Doing aerobic activity makes your heart and lungs strong. Includes walking, dancing, and gardening. Aim for at least 2½ hours spread throughout the week. It improves your energy and can help you sleep better. Muscle-strengthening. This type of activity can help maintain muscle and strengthen bones. Includes climbing stairs, using resistance bands, and lifting or carrying heavy loads. Aim for at least twice a week. It can help protect the knees and other joints. Stretching. Stretching gives you better range of motion in joints and muscles. Includes upper arm stretches, calf stretches, and gentle yoga. Aim for at least twice a week, preferably after your muscles are warmed up from other activities. It can help you function better in daily life. Balancing. This helps you stay coordinated and have good posture. Includes heel-to-toe walking, vincenzo chi, and certain types of yoga. Aim for at least 3 days a week. It can reduce your risk of falling.   Even if you have a hard time meeting the recommendations, it's better to be more active than less active. All activity done in each category counts toward your weekly total. You'd be surprised how daily things like carrying groceries, keeping up with grandchildren, and taking the stairs can add up. What keeps you from being active? If you've had a hard time being more active, you're not alone. Maybe you remember being able to do more. Or maybe you've never thought of yourself as being active. It's frustrating when you can't do the things you want. Being more active can help. What's holding you back? Getting started. Have a goal, but break it into easy tasks. Small steps build into big accomplishments. Staying motivated. If you feel like skipping your activity, remember your goal. Maybe you want to move better and stay independent. Every activity gets you one step closer. Not feeling your best.  Start with 5 minutes of an activity you enjoy. Prove to yourself you can do it. As you get comfortable, increase your time. You may not be where you want to be. But you're in the process of getting there. Everyone starts somewhere. How can you find safe ways to stay active? Talk with your doctor about any physical challenges you're facing. Make a plan with your doctor if you have a health problem or aren't sure how to get started with activity. If you're already active, ask your doctor if there is anything you should change to stay safe as your body and health change. If you tend to feel dizzy after you take medicine, avoid activity at that time. Try being active before you take your medicine. This will reduce your risk of falls. If you plan to be active at home, make sure to clear your space before you get started. Remove things like TV cords, coffee tables, and throw rugs. It's safest to have plenty of space to move freely. The key to getting more active is to take it slow and steady. Try to improve only a little bit at a time.  Pick just one area to improve on at first. And if an activity hurts, stop and talk to your doctor. Where can you learn more? Go to http://www.esparza.com/ and enter P600 to learn more about \"Learning About Being Active as an Older Adult. \"  Current as of: October 10, 2022               Content Version: 13.5  © 9000-6664 Healthwise, Incorporated. Care instructions adapted under license by [facility]. If you have questions about a medical condition or this instruction, always ask your healthcare professional. [place] [address] any warranty or liability for your use of this information. Advance Directives: Care Instructions  Overview  An advance directive is a legal way to state your wishes at the end of your life. It tells your family and your doctor what to do if you can't say what you want. There are two main types of advance directives. You can change them any time your wishes change. Living will. This form tells your family and your doctor your wishes about life support and other treatment. The form is also called a declaration. Medical power of . This form lets you name a person to make treatment decisions for you when you can't speak for yourself. This person is called a health care agent (health care proxy, health care surrogate). The form is also called a durable power of  for health care. If you do not have an advance directive, decisions about your medical care may be made by a family member, or by a doctor or a  who doesn't know you. It may help to think of an advance directive as a gift to the people who care for you. If you have one, they won't have to make tough decisions by themselves. For more information, including forms for your state, see the [address] website (www.caringinfo.org/planning/advance-directives/). Follow-up care is a key part of your treatment and safety. Be sure to make and go to all appointments, and call your doctor if you are having problems.  It's also a good idea to know your test results and keep a list of the medicines you take. What should you include in an advance directive? Many states have a unique advance directive form. (It may ask you to address specific issues.) Or you might use a universal form that's approved by many states. If your form doesn't tell you what to address, it may be hard to know what to include in your advance directive. Use the questions below to help you get started. Who do you want to make decisions about your medical care if you are not able to? What life-support measures do you want if you have a serious illness that gets worse over time or can't be cured? What are you most afraid of that might happen? (Maybe you're afraid of having pain, losing your independence, or being kept alive by machines.)  Where would you prefer to die? (Your home? A hospital? A nursing home?)  Do you want to donate your organs when you die? Do you want certain Anabaptist practices performed before you die? When should you call for help? Be sure to contact your doctor if you have any questions. Where can you learn more? Go to http://[employer]ip[employer].com.esparza.com/ and enter R264 to learn more about \"Advance Directives: Care Instructions. \"  Current as of: June 16, 2022               Content Version: 13.5  © 5115-7215 Healthwise, Incorporated. Care instructions adapted under license by [facility] ([facility]). If you have questions about a medical condition or this instruction, always ask your healthcare professional. [place] [address] any warranty or liability for your use of this information. Personalized Preventive Plan for Reynaldo Altman - 12/20/2022  Medicare offers a range of preventive health benefits. Some of the tests and screenings are paid in full while other may be subject to a deductible, co-insurance, and/or copay.     Some of these benefits include a comprehensive review of your medical history including lifestyle, illnesses that may run in your family, and various assessments and screenings as appropriate. After reviewing your medical record and screening and assessments performed today your provider may have ordered immunizations, labs, imaging, and/or referrals for you. A list of these orders (if applicable) as well as your Preventive Care list are included within your After Visit Summary for your review. Other Preventive Recommendations:    A preventive eye exam performed by an eye specialist is recommended every 1-2 years to screen for glaucoma; cataracts, macular degeneration, and other eye disorders. A preventive dental visit is recommended every 6 months. Try to get at least 150 minutes of exercise per week or 10,000 steps per day on a pedometer . Order or download the FREE \"Exercise & Physical Activity: Your Everyday Guide\" from [employer]. Call 3-165.511.7553 or search [employer] online. You need 4132-4283 mg of calcium and 3260-6778 IU of vitamin D per day. It is possible to meet your calcium requirement with diet alone, but a vitamin D supplement is usually necessary to meet this goal.  When exposed to the sun, use a sunscreen that protects against both UVA and UVB radiation with an SPF of 30 or greater. Reapply every 2 to 3 hours or after sweating, drying off with a towel, or swimming. Always wear a seat belt when traveling in a car. Always wear a helmet when riding a bicycle or motorcycle.

## 2022-12-20 NOTE — PROGRESS NOTES
Medicare Annual Wellness Visit    Julia Tse is here for Medicare AWV    Assessment & Plan   Medicare annual wellness visit, subsequent  Colon cancer screening  -     Ankita Verduzco MD, General Surgery, [facility]  Pulmonary Mycobacterium avium complex (MAC) infection [facility])  Chronic obstructive pulmonary disease, unspecified COPD type ([facility]  Chronic respiratory failure with hypoxia ([facility]  Lumbar degenerative disc disease  Chronic pain syndrome  Chronic bilateral low back pain without sciatica  Immunocompromised ([facility]  S/P lumbar fusion    Recommendations for Preventive Services Due: see orders and patient instructions/AVS.  Recommended screening schedule for the next 5-10 years is provided to the patient in written form: see Patient Instructions/AVS.     Return for Medicare Annual Wellness Visit in 1 year. Subjective   Patient comes in for his annual wellness visit. He is now 81years of age. Currently in relatively well. He remains on continuous supplemental oxygen. He denies any fever or chills. He remains on all of his current meds and supplements the same as listed on his med list, which was reviewed with him. He follows up with his pulmonologist and infectious disease specialist for his chronic Mycobacterium AVM complex lung infection. He remains on azithromycin 500 mg daily long-term. Also he continues to have problems with chronic low back pain due to severe lumbar degenerative disc disease and follows up with pain management and requires oxycodone as needed for pain control. Patient's complete Health Risk Assessment and screening values have been reviewed and are found in Flowsheets. The following problems were reviewed today and where indicated follow up appointments were made and/or referrals ordered.     Positive Risk Factor Screenings with Interventions:                Opioid Risk: (Low risk score <55) Opioid risk score: 10    Patient is low risk for opioid use disorder or overdose. Last PDMP Cameron Cosme as Reviewed:  Review User Review Instant Review Result              Last Controlled Substance Monitoring Documentation      [address] Office Visit from 1/24/2018 in [address] The Prescription Monitoring Report for this patient was reviewed today. filed at 01/26/2018 1032   Periodic Controlled Substance Monitoring No signs of potential drug abuse or diversion identified. filed at 01/26/2018 1032              Weight and Activity:  Physical Activity: Inactive    Days of Exercise per Week: 0 days    Minutes of Exercise per Session: 0 min     On average, how many days per week do you engage in moderate to strenuous exercise (like a brisk walk)?: 0 days  Have you lost any weight without trying in the past 3 months?: No  Body mass index: (!) 25.33    Inactivity Interventions:  Patient declined any further interventions or treatment         Safety:  Do you always fasten your seatbelt when you are in a car?: (!) No  Interventions:  No additional health risk interventions  See AVS for additional education material  See A/P for plan and any pertinent orders     Lung Cancer Screening:  Guidelines regarding LDCT screening for lung cancer reviewed. Patient declined screening. Objective   Vitals:    12/20/22 1319   BP: 136/82   Site: Left Upper Arm   Position: Sitting   Cuff Size: Medium Adult   Pulse: 68   Resp: 18   Temp: 97 °F (36.1 °C)   TempSrc: Temporal   SpO2: 98%   Weight: 192 lb (87.1 kg)   Height: 6' 1\" (1.854 m)      Body mass index is 25.33 kg/m². Exam: Patient appears to be in no distress. He is breathing comfortably on supplemental oxygen. He is alert and oriented. HEENT normal.  Neck supple no adenopathy or bruits. Heart regular rhythm no murmurs gallops or rubs. Lungs: Breath sounds distant. Otherwise clear. Abdomen normal.  Extremities no edema. Peripheral pulses normal.  No neurologic deficits noted.       No Known Allergies  Prior to Visit Medications    Medication Sig Taking? Authorizing Provider   lactobacillus (CULTURELLE) CAPS capsule Take 1 capsule by mouth daily Yes Aneesh Jaimes,    nystatin (MYCOSTATIN) 734929 UNIT/ML suspension Take 5 mLs by mouth 4 times daily Yes Jim Jaimes DO   phenol 1.4 % LIQD mouth spray Take 1 spray by mouth every 2 hours as needed for Sore Throat Yes Jim Jaimes DO   ipratropium-albuterol (DUONEB) 0.5-2.5 (3) MG/3ML SOLN nebulizer solution Inhale 1 vial into the lungs every 4 hours as needed for Shortness of Breath Yes Historical Provider, MD   pantoprazole (PROTONIX) 40 MG tablet Take 40 mg by mouth daily as needed (EPIGASTRIC DISCOMFORT) Yes Historical Provider, MD   budesonide (PULMICORT) 0.5 MG/2ML nebulizer suspension Take 1 ampule by nebulization 2 times daily Yes Historical Provider, MD   OXYGEN Inhale 4-4.5 L/min into the lungs continuous Yes Historical Provider, MD   azithromycin (ZITHROMAX) 250 MG tablet Take 500 mg by mouth every other day Indications: Mild Upper Respiratory Infection Yes Historical Provider, MD   formoterol (PERFOROMIST) 20 MCG/2ML nebulizer solution Take 2 mLs by nebulization 2 times daily Yes MIGUE Posey - JOY   oxyCODONE-acetaminophen (PERCOCET) 7.5-325 MG per tablet Take 1 tablet by mouth 2 times daily as needed for Pain. Yes Historical Provider, MD Mitchell (Including outside providers/suppliers regularly involved in providing care):   Patient Care Team:  Neetu Colón MD as PCP - General (Internal Medicine)  Neetu Colón MD as PCP - REHABILITATION [facility] Empaneled Provider  Jinny Jalloh MD as Consulting Physician (Pulmonology)     Reviewed and updated this visit:  Tobacco  Allergies  Meds  Med Hx  Surg Hx  Soc Hx  Fam Hx      Discussion: He will continue his usual meds and supplements the same as listed on his med list.  He will follow-up with his pulmonologist and infectious disease specialist as per their instructions.   He is due to return here in May for his routine 6-month follow-up visit.

## 2022-12-26 PROBLEM — J21.0 RSV (ACUTE BRONCHIOLITIS DUE TO RESPIRATORY SYNCYTIAL VIRUS): Status: RESOLVED | Noted: 2022-11-18 | Resolved: 2022-12-26

## 2022-12-26 PROBLEM — J44.1 ACUTE EXACERBATION OF CHRONIC OBSTRUCTIVE PULMONARY DISEASE (COPD) (HCC): Status: RESOLVED | Noted: 2022-03-14 | Resolved: 2022-12-26

## 2023-01-26 ENCOUNTER — OFFICE VISIT (OUTPATIENT)
Dept: SURGERY | Age: 73
End: 2023-01-26

## 2023-01-26 VITALS
SYSTOLIC BLOOD PRESSURE: 121 MMHG | OXYGEN SATURATION: 89 % | BODY MASS INDEX: 26.11 KG/M2 | TEMPERATURE: 97.1 F | HEART RATE: 87 BPM | DIASTOLIC BLOOD PRESSURE: 75 MMHG | HEIGHT: 73 IN | WEIGHT: 197 LBS

## 2023-01-26 DIAGNOSIS — Z86.010 HISTORY OF COLON POLYPS: Primary | ICD-10-CM

## 2023-01-26 PROCEDURE — 1123F ACP DISCUSS/DSCN MKR DOCD: CPT | Performed by: SURGERY

## 2023-01-26 PROCEDURE — G8417 CALC BMI ABV UP PARAM F/U: HCPCS | Performed by: SURGERY

## 2023-01-26 PROCEDURE — G8427 DOCREV CUR MEDS BY ELIG CLIN: HCPCS | Performed by: SURGERY

## 2023-01-26 PROCEDURE — G8484 FLU IMMUNIZE NO ADMIN: HCPCS | Performed by: SURGERY

## 2023-01-26 PROCEDURE — 3017F COLORECTAL CA SCREEN DOC REV: CPT | Performed by: SURGERY

## 2023-01-26 PROCEDURE — 1036F TOBACCO NON-USER: CPT | Performed by: SURGERY

## 2023-01-26 PROCEDURE — 99999 PR OFFICE/OUTPT VISIT,PROCEDURE ONLY: CPT | Performed by: SURGERY

## 2023-01-26 NOTE — PROGRESS NOTES
[facility] Note    Assessment/Plan:      Diagnosis Orders   1. History of colon polyps      We will plan for colonoscopy pending pulmonary clearance             Return for Colonoscopy. Chief Complaint   Patient presents with    New Patient     Screening colonoscopy       PCP: Erika Dimas MD    HPI: Charlee Guo is a 81 y.o. male who presents in consultation for colonoscopy. His last was 10 years ago. He says he had polyps removed. He denies any acute issues. He has no problems with diarrhea or constipation. There is no melena or hematochezia. He has no abdominal pain or intentional weight loss. There are no bowel caliber changes. There is no family history of colon cancer or inflammatory bowel disease. He follows with pulmonology. He is on 4 to 5 L of oxygen which is stable for him.       Past Medical History:   Diagnosis Date    Acute and chronic respiratory failure with hypoxia ([address] 10/12/2017    Acute heart failure with preserved ejection fraction ([address] 4/8/2021    Acute respiratory disease due to severe acute respiratory syndrome coronavirus 2 (SARS-CoV-2) 01/01/2021    Acute respiratory failure with hypoxia ([address] 11/12/2018    Bullous emphysema ([address] 11/12/2018    Chronic back pain     Degeneration of umbar intervertebral disc    Colon cancer screening     COPD (chronic obstructive pulmonary disease) ([address]     Coronavirus infection 2/2/2022    Coronavirus 229E    Cough with hemoptysis 04/23/2019    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 ([address] 08/15/2019    First seen by ID; treatment started 9/4/2019    Emphysema lung ([address]     Glucose intolerance 06/10/2019    Hilar adenopathy 06/10/2019    Hypophosphatemia 06/10/2019    Immunocompromised ([address] 2/3/2022    Infection due to parainfluenza virus 3 06/10/2019    Iron deficiency 2/3/2022    Left upper lobe pneumonia 10/12/2017    Pleural effusion on right 10/03/2019    Pulmonary emphysema with fibrosis of lung ([facility] 11/15/2018    Pulmonary Mycobacterium avium complex (MAC) infection ([facility] 07/12/2019    BAL results finally completed this date    Rhinovirus infection 10/16/2020    Right lower lobe pneumonia 11/15/2018    Recurrent 4/23/19    RSV (acute bronchiolitis due to respiratory syncytial virus) 11/18/2022    S/P lumbar fusion 06/01/2019    Thoracic ascending aortic aneurysm 11/15/2018    Proximal ascending aorta; 3.8 cm; 11/15/18       Past Surgical History:   Procedure Laterality Date    ABSCESS DRAINAGE  2006    X2 RECTAL ABSCESS    BRONCHOSCOPY N/A 06/12/2019    BRONCHOSCOPY BRUSHINGS performed by Sophia Dee MD at [address] N/A 10/07/2019    BRONCHOSCOPY DIAGNOSTIC OR CELL [address] ONLY performed by Sophia Dee MD at [address] N/A 04/12/2021    BRONCHOSCOPY performed by Sophia Dee MD at [address] [place] [address]  11/18/2013    HC INSERT PICC CATH, 5/> YRS  04/10/2021         LUMBAR FUSION  03/2019    [facility]       Prior to Admission medications    Medication Sig Start Date End Date Taking?  Authorizing Provider   phenol 1.4 % LIQD mouth spray Take 1 spray by mouth every 2 hours as needed for Sore Throat 11/22/22  Yes Ariel Dockery,    ipratropium-albuterol (DUONEB) 0.5-2.5 (3) MG/3ML SOLN nebulizer solution Inhale 1 vial into the lungs every 4 hours as needed for Shortness of Breath   Yes Historical Provider, MD   pantoprazole (PROTONIX) 40 MG tablet Take 40 mg by mouth daily as needed (EPIGASTRIC DISCOMFORT)   Yes Historical Provider, MD   budesonide (PULMICORT) 0.5 MG/2ML nebulizer suspension Take 1 ampule by nebulization 2 times daily   Yes Historical Provider, MD   OXYGEN Inhale 4-4.5 L/min into the lungs continuous   Yes Historical Provider, MD   azithromycin (ZITHROMAX) 250 MG tablet Take 500 mg by mouth every other day Indications: Mild Upper Respiratory Infection   Yes Historical Provider, MD   formoterol (PERFOROMIST) 20 MCG/2ML nebulizer solution Take 2 mLs by nebulization 2 times daily 22  Yes MIGUE Mckenzie - NP   oxyCODONE-acetaminophen (PERCOCET) 7.5-325 MG per tablet Take 1 tablet by mouth 2 times daily as needed for Pain. 3/11/20  Yes Historical Provider, MD   lactobacillus (CULTURELLE) CAPS capsule Take 1 capsule by mouth daily 22  Avelino Jaimes DO   nystatin (MYCOSTATIN) [id number] UNIT/ML suspension Take 5 mLs by mouth 4 times daily  Patient not taking: Reported on 22   Parminder Berry DO       No Known Allergies    Social History     Socioeconomic History    Marital status:      Spouse name: None    Number of children: None    Years of education: None    Highest education level: None   Tobacco Use    Smoking status: Former     Packs/day: 2.00     Years: 46.00     Pack years: 92.00     Types: Cigarettes     Start date: 10/12/1968     Quit date: 10/12/2014     Years since quittin.3    Smokeless tobacco: Never   Vaping Use    Vaping Use: Never used   Substance and Sexual Activity    Alcohol use: No    Drug use: No    Sexual activity: Not Currently     Partners: Female     Social Determinants of Health     Financial Resource Strain: Low Risk     Difficulty of Paying Living Expenses: Not hard at all   Food Insecurity: No Food Insecurity    Worried About Running Out of Food in the Last Year: Never true    Ran Out of Food in the Last Year: Never true   Physical Activity: Inactive    Days of Exercise per Week: 0 days    Minutes of Exercise per Session: 0 min       Family History   Problem Relation Age of Onset    Other Mother          age 80    Other Father          age 80       Review of Systems   All other systems reviewed and are negative.             Objective:  Vitals:    23 1525   BP: 121/75   Pulse: 87   Temp: 97.1 °F (36.2 °C)   TempSrc: Temporal   SpO2: (!) 89%   Weight: 197 lb (89.4 kg)   Height: 6' 1\" (1.854 m)          Physical Exam  Constitutional: General: He is not in acute distress. Appearance: He is not diaphoretic. HENT:      Head: Normocephalic and atraumatic. Eyes:      General:         Right eye: No discharge. Left eye: No discharge. Neck:      Trachea: No tracheal deviation. Cardiovascular:      Rate and Rhythm: Normal rate. Pulmonary:      Effort: Pulmonary effort is normal. No respiratory distress. Comments: On O2  Abdominal:      General: There is no distension. Palpations: Abdomen is soft. Tenderness: There is no abdominal tenderness. There is no guarding or rebound. Skin:     General: Skin is warm and dry. Neurological:      Mental Status: He is alert and oriented to person, place, and time. Mireya Tristan MD    I have examined the patient and performed the key aspects of physical exam, reviewed the record (including all pertinent and new radiology images and laboratory findings, referring provider notes and results), and discussed the case with the primary and referring provider where applicable. NOTE: This report, in part or full,may have been transcribed using voice recognition software. Every effort was made to ensure accuracy; however, inadvertent computerized transcription errors may be present. Please excuse any transcriptional grammatical or spelling errors that may have escaped my editorial review.     CC: Marietta Mims MD, Dr. Irene Ambrose

## 2023-01-31 ENCOUNTER — PREP FOR PROCEDURE (OUTPATIENT)
Dept: SURGERY | Age: 73
End: 2023-01-31

## 2023-01-31 PROBLEM — Z12.11 SCREENING FOR COLON CANCER: Status: ACTIVE | Noted: 2023-01-31

## 2023-02-01 ENCOUNTER — OFFICE VISIT (OUTPATIENT)
Dept: ENT CLINIC | Age: 73
End: 2023-02-01

## 2023-02-01 VITALS
SYSTOLIC BLOOD PRESSURE: 131 MMHG | HEIGHT: 73 IN | WEIGHT: 195 LBS | DIASTOLIC BLOOD PRESSURE: 75 MMHG | HEART RATE: 83 BPM | BODY MASS INDEX: 25.84 KG/M2

## 2023-02-01 DIAGNOSIS — H61.23 BILATERAL IMPACTED CERUMEN: Primary | ICD-10-CM

## 2023-02-01 ASSESSMENT — ENCOUNTER SYMPTOMS
ABDOMINAL PAIN: 0
VOMITING: 0
EYES NEGATIVE: 1
COLOR CHANGE: 0
APNEA: 0
DIARRHEA: 0
SHORTNESS OF BREATH: 0
EYE PAIN: 0
EYE DISCHARGE: 0
GASTROINTESTINAL NEGATIVE: 1
CHEST TIGHTNESS: 0
RESPIRATORY NEGATIVE: 1

## 2023-02-01 NOTE — PROGRESS NOTES
Subjective:      Patient ID:  Kaden Toledo is a 81 y.o. male. HPI:    Pt presents with a history of cerumen impaction removal.   The patients ear was last cleaned 6 month(s) ago. The patient was not using ear drops to loosen wax immediately prior to this visit.       Hearing aids: no      Past Medical History:   Diagnosis Date    Acute and chronic respiratory failure with hypoxia ([address] 10/12/2017    Acute heart failure with preserved ejection fraction ([address] 4/8/2021    Acute respiratory disease due to severe acute respiratory syndrome coronavirus 2 (SARS-CoV-2) 01/01/2021    Acute respiratory failure with hypoxia ([address] 11/12/2018    Bullous emphysema ([address] 11/12/2018    Chronic back pain     Degeneration of umbar intervertebral disc    Colon cancer screening     COPD (chronic obstructive pulmonary disease) ([address]     Coronavirus infection 2/2/2022    Coronavirus 229E    Cough with hemoptysis 04/23/2019    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 ([address] 08/15/2019    First seen by ID; treatment started 9/4/2019    Emphysema lung ([address]     Glucose intolerance 06/10/2019    Hilar adenopathy 06/10/2019    Hypophosphatemia 06/10/2019    Immunocompromised ([address] 2/3/2022    Infection due to parainfluenza virus 3 06/10/2019    Iron deficiency 2/3/2022    Left upper lobe pneumonia 10/12/2017    Pleural effusion on right 10/03/2019    Pulmonary emphysema with fibrosis of lung ([address] 11/15/2018    Pulmonary Mycobacterium avium complex (MAC) infection ([address] 07/12/2019    BAL results finally completed this date    Rhinovirus infection 10/16/2020    Right lower lobe pneumonia 11/15/2018    Recurrent 4/23/19    RSV (acute bronchiolitis due to respiratory syncytial virus) 11/18/2022    S/P lumbar fusion 06/01/2019    Thoracic ascending aortic aneurysm 11/15/2018    Proximal ascending aorta; 3.8 cm; 11/15/18     Past Surgical History:   Procedure Laterality Date    ABSCESS DRAINAGE  2006    X2 RECTAL ABSCESS    BRONCHOSCOPY N/A 2019    BRONCHOSCOPY BRUSHINGS performed by Maria Isabel Torres MD at [address] N/A 10/07/2019    BRONCHOSCOPY DIAGNOSTIC OR CELL [address] ONLY performed by Maria Isabel Torres MD at [address] N/A 2021    BRONCHOSCOPY performed by Maria Isabel Torres MD at [address]  2013    HC INSERT PICC CATH, 5/> YRS  04/10/2021         LUMBAR FUSION  2019    [facility]     Family History   Problem Relation Age of Onset    Other Mother          age 80    Other Father          age 80     Social History     Socioeconomic History    Marital status:      Spouse name: None    Number of children: None    Years of education: None    Highest education level: None   Tobacco Use    Smoking status: Former     Packs/day: 2.00     Years: 46.00     Pack years: 92.00     Types: Cigarettes     Start date: 10/12/1968     Quit date: 10/12/2014     Years since quittin.3    Smokeless tobacco: Never   Vaping Use    Vaping Use: Never used   Substance and Sexual Activity    Alcohol use: No    Drug use: No    Sexual activity: Not Currently     Partners: Female     Social Determinants of Health     Financial Resource Strain: Low Risk     Difficulty of Paying Living Expenses: Not hard at all   Food Insecurity: No Food Insecurity    Worried About Running Out of Food in the Last Year: Never true    Ran Out of Food in the Last Year: Never true   Physical Activity: Inactive    Days of Exercise per Week: 0 days    Minutes of Exercise per Session: 0 min     No Known Allergies    Review of Systems   Constitutional: Negative. Negative for appetite change. HENT:  Positive for congestion and hearing loss. Eyes: Negative. Negative for pain, discharge and visual disturbance. Respiratory: Negative. Negative for apnea, chest tightness and shortness of breath. Cardiovascular: Negative.   Negative for chest pain, palpitations and leg swelling. Gastrointestinal: Negative. Negative for abdominal pain, diarrhea and vomiting. Endocrine: Negative for cold intolerance, heat intolerance and polydipsia. Genitourinary: Negative. Negative for dysuria, flank pain and hematuria. Musculoskeletal: Negative. Negative for arthralgias, gait problem and neck pain. Skin: Negative. Negative for color change, pallor and rash. Allergic/Immunologic: Negative for environmental allergies, food allergies and immunocompromised state. Neurological: Negative. Negative for dizziness, numbness and headaches. Hematological:  Negative for adenopathy. Psychiatric/Behavioral: Negative. Negative for behavioral problems and hallucinations. All other systems reviewed and are negative. Objective:     Vitals:    02/01/23 1428   BP: 131/75   Pulse: 83     Physical Exam  Vitals and nursing note reviewed. Constitutional:       Appearance: He is well-developed. HENT:      Head: Normocephalic and atraumatic. Right Ear: There is impacted cerumen. Left Ear: There is impacted cerumen. Nose: Nose normal.      Mouth/Throat:      Pharynx: Uvula midline. Eyes:      Conjunctiva/sclera: Conjunctivae normal.      Pupils: Pupils are equal, round, and reactive to light. Cardiovascular:      Rate and Rhythm: Normal rate and regular rhythm. Heart sounds: Normal heart sounds. Pulmonary:      Effort: Pulmonary effort is normal.      Breath sounds: Normal breath sounds. Abdominal:      General: Bowel sounds are normal.      Palpations: Abdomen is soft. Musculoskeletal:      Cervical back: Normal range of motion and neck supple. Skin:     General: Skin is warm and dry. Neurological:      Mental Status: He is alert and oriented to person, place, and time. Cerumen removal     Auditory canal(s) both ears completely obstructed with cerumen. A microscope was not used . Cerumen was gently removed using soft plastic curette. Tympanic membranes are intact following the procedure. Auditory canals appear normal.            Assessment:       Diagnosis Orders   1. Bilateral impacted cerumen                   Plan:      Cerumen impaction   Discussed H2O2 and irrigation bi-weekly for maintenance.     Follow up in 6 month(s)

## 2023-02-10 ENCOUNTER — HOSPITAL ENCOUNTER (OUTPATIENT)
Age: 73
Discharge: HOME | End: 2023-02-10
Payer: MEDICARE

## 2023-02-10 LAB
ALBUMIN SERPL-MCNC: 4.5 G/DL (ref 3.5–5.2)
ALP BLD-CCNC: 74 U/L (ref 40–129)
ALT SERPL-CCNC: 16 U/L (ref 0–40)
ANION GAP SERPL CALCULATED.3IONS-SCNC: 9 MMOL/L (ref 7–16)
AST SERPL-CCNC: 26 U/L (ref 0–39)
BASOPHILS ABSOLUTE: 0.03 E9/L (ref 0–0.2)
BASOPHILS RELATIVE PERCENT: 0.5 % (ref 0–2)
BILIRUB SERPL-MCNC: 0.8 MG/DL (ref 0–1.2)
BUN BLDV-MCNC: 9 MG/DL (ref 6–23)
C-REACTIVE PROTEIN: <0.3 MG/DL (ref 0–0.4)
CALCIUM SERPL-MCNC: 10.3 MG/DL (ref 8.6–10.2)
CHLORIDE BLD-SCNC: 100 MMOL/L (ref 98–107)
CO2: 27 MMOL/L (ref 22–29)
CREAT SERPL-MCNC: 0.8 MG/DL (ref 0.7–1.2)
EOSINOPHILS ABSOLUTE: 0.06 E9/L (ref 0.05–0.5)
EOSINOPHILS RELATIVE PERCENT: 0.9 % (ref 0–6)
GFR SERPL CREATININE-BSD FRML MDRD: >60 ML/MIN/1.73
GLUCOSE BLD-MCNC: 74 MG/DL (ref 74–99)
HCT VFR BLD CALC: 54 % (ref 37–54)
HEMOGLOBIN: 18 G/DL (ref 12.5–16.5)
IMMATURE GRANULOCYTES #: 0.01 E9/L
IMMATURE GRANULOCYTES %: 0.2 % (ref 0–5)
LYMPHOCYTES ABSOLUTE: 1.29 E9/L (ref 1.5–4)
LYMPHOCYTES RELATIVE PERCENT: 20 % (ref 20–42)
MCH RBC QN AUTO: 31.1 PG (ref 26–35)
MCHC RBC AUTO-ENTMCNC: 33.3 % (ref 32–34.5)
MCV RBC AUTO: 93.4 FL (ref 80–99.9)
MONOCYTES ABSOLUTE: 0.37 E9/L (ref 0.1–0.95)
MONOCYTES RELATIVE PERCENT: 5.7 % (ref 2–12)
NEUTROPHILS ABSOLUTE: 4.69 E9/L (ref 1.8–7.3)
NEUTROPHILS RELATIVE PERCENT: 72.7 % (ref 43–80)
PDW BLD-RTO: 13.7 FL (ref 11.5–15)
PLATELET # BLD: 211 E9/L (ref 130–450)
PMV BLD AUTO: 9.9 FL (ref 7–12)
POTASSIUM SERPL-SCNC: 4.6 MMOL/L (ref 3.5–5)
RBC # BLD: 5.78 E12/L (ref 3.8–5.8)
SEDIMENTATION RATE, ERYTHROCYTE: 1 MM/HR (ref 0–15)
SODIUM BLD-SCNC: 136 MMOL/L (ref 132–146)
TOTAL PROTEIN: 6.9 G/DL (ref 6.4–8.3)
WBC # BLD: 6.5 E9/L (ref 4.5–11.5)

## 2023-02-10 PROCEDURE — 85651 RBC SED RATE NONAUTOMATED: CPT

## 2023-02-10 PROCEDURE — 80053 COMPREHEN METABOLIC PANEL: CPT

## 2023-02-10 PROCEDURE — 85025 COMPLETE CBC W/AUTO DIFF WBC: CPT

## 2023-02-10 PROCEDURE — 86140 C-REACTIVE PROTEIN: CPT

## 2023-02-10 PROCEDURE — 36415 COLL VENOUS BLD VENIPUNCTURE: CPT

## 2023-03-02 PROBLEM — Z12.11 SCREENING FOR COLON CANCER: Status: RESOLVED | Noted: 2023-01-31 | Resolved: 2023-03-02

## 2023-03-07 ENCOUNTER — COMMUNITY OUTREACH (OUTPATIENT)
Dept: PRIMARY CARE CLINIC | Age: 73
End: 2023-03-07

## 2023-03-07 NOTE — PROGRESS NOTES
Patient's HM shows they are overdue for Colorectal Screening. Care Everywhere and  files searched. HM updated with 2008 & 2013 colonoscopy. He is also scheduled with Dr. Yaw Helms 5/18/2023.

## 2023-05-03 ENCOUNTER — CLINICAL DOCUMENTATION (OUTPATIENT)
Dept: SURGERY | Age: 73
End: 2023-05-03

## 2023-05-03 NOTE — PROGRESS NOTES
MA received pulmonary clearance from   Dr Taylor Sanabria for patient's upcoming colonoscopy.   Electronically signed by Murphy Enrique MA on 5/3/2023 at 7:37 AM

## 2023-05-15 ENCOUNTER — HOSPITAL ENCOUNTER (OUTPATIENT)
Dept: CT IMAGING | Age: 73
Discharge: HOME | End: 2023-05-17
Payer: MEDICARE

## 2023-05-15 ENCOUNTER — TELEPHONE (OUTPATIENT)
Dept: CASE MANAGEMENT | Age: 73
End: 2023-05-15

## 2023-05-15 DIAGNOSIS — R91.1 LUNG NODULE: ICD-10-CM

## 2023-05-15 PROCEDURE — 71271 CT THORAX LUNG CANCER SCR C-: CPT

## 2023-05-15 NOTE — TELEPHONE ENCOUNTER
Patient was called on 5/12/2023 by Jordyn PALACIO and he confirmed his CT lung screening at [facility] on 5/15/2023 at 3:30 pm.  I reminded the patient to arrive at 3:00 pm, enter through the main entrance, and register. Patient confirmed.           Electronically signed by Marco Antonio Bro on 5/15/23 at 9:41 AM EDT

## 2023-05-16 ENCOUNTER — TELEPHONE (OUTPATIENT)
Dept: CASE MANAGEMENT | Age: 73
End: 2023-05-16

## 2023-05-16 NOTE — TELEPHONE ENCOUNTER
No call, encounter opened to process CT Lung Screening. CT Lung Screen: 5/15/2023    IMPRESSION:  Severe emphysematous changes with bilateral pulmonary parenchymal scarring  bilaterally. A 4 mm left upper lobe nodule is stable. A 1 year follow-up,  low-dose chest CT is recommended. Multiple top-normal and mildly enlarged mediastinal lymph nodes, similar in  appearance to the March 15, 2022 exam.  These are of uncertain etiology or  clinical significance. LUNG RADS:  Lung-RADS 2 - Benign ()     Management:  12 month screening LDCT     RECOMMENDATIONS:  If you would like to register your patient with the [employer], please contact the Nurse Navigator at  [phone]. Pack years: 80    Social History     Tobacco Use  Smoking Status: Former Smoker    Start Date: 10/12/1968   Quit Date: 10/12/2016   Types: Cigarettes   Packs/Day: 2   Years: 55   Pack Years: 80   Smokeless Tobacco: Never         Results letter sent to patient via my chart or mailed.      [facility]

## 2023-05-16 NOTE — PROGRESS NOTES
[facility] PRE-ADMISSION TESTING INSTRUCTIONS      ARRIVAL INSTRUCTIONS:  [x] Parking the day of Surgery is located in the Main Entrance lot. Upon entering the main door make an immediate right to the surgery reception desk. [x] Bring photo ID and insurance card    [] Bring in a copy of Living will or Durable Power of  papers. [x] Please be sure to arrange for responsible adult to provide transportation to and from the hospital    [x] Please arrange for responsible adult to be with you for the 24 hour period post procedure due to having anesthesia    [x] If you awake am of surgery not feeling well or have temperature >100 please call [phone]    GENERAL INSTRUCTIONS:    [x] Nothing by mouth after midnight, including gum, candy, mints or water    [x] You may brush your teeth, but do not swallow any water    [x] Take medications as instructed with 1-2 oz of water    [x] Stop herbal supplements and vitamins 5 days prior to procedure    [x] Follow preop dosing of blood thinners per physician instructions    [] Take 1/2 dose of evening insulin, but no insulin after midnight    [] No oral diabetic medications after midnight    [] If diabetic and have low blood sugar or feel symptomatic, take 1-2oz apple juice only    [] Bring inhalers day of surgery    [] Bring C-PAP/ Bi-Pap day of surgery    [] Bring urine specimen day of surgery    [x] Shower or bath with soap, lather and rinse well, AM of Surgery, no lotion, powders or creams to surgical site    [x] Follow bowel prep as instructed per surgeon    [x] No tobacco products within 24 hours of surgery     [x] No alcohol or illegal drug use within 24 hours of surgery.     [x] Jewelry, body piercing's, eyeglasses, contact lenses and dentures are not permitted into surgery (bring cases)      [x] Please do not wear any nail polish, make up or hair products on the day of surgery    [x] You can expect a call the business day prior to

## 2023-05-18 ENCOUNTER — ANESTHESIA (OUTPATIENT)
Dept: ENDOSCOPY | Age: 73
End: 2023-05-18
Payer: MEDICARE

## 2023-05-18 ENCOUNTER — ANESTHESIA EVENT (OUTPATIENT)
Dept: ENDOSCOPY | Age: 73
End: 2023-05-18
Payer: MEDICARE

## 2023-05-18 ENCOUNTER — HOSPITAL ENCOUNTER (OUTPATIENT)
Age: 73
Setting detail: OUTPATIENT SURGERY
Discharge: HOME | End: 2023-05-18
Attending: SURGERY | Admitting: SURGERY
Payer: MEDICARE

## 2023-05-18 VITALS
OXYGEN SATURATION: 94 % | TEMPERATURE: 96.8 F | HEART RATE: 63 BPM | DIASTOLIC BLOOD PRESSURE: 78 MMHG | SYSTOLIC BLOOD PRESSURE: 124 MMHG | BODY MASS INDEX: 25.18 KG/M2 | WEIGHT: 190 LBS | HEIGHT: 73 IN | RESPIRATION RATE: 18 BRPM

## 2023-05-18 DIAGNOSIS — Z12.11 SCREENING FOR COLON CANCER: ICD-10-CM

## 2023-05-18 PROCEDURE — 2580000003 HC RX 258

## 2023-05-18 PROCEDURE — 88305 TISSUE EXAM BY PATHOLOGIST: CPT

## 2023-05-18 PROCEDURE — 3609010600 HC COLONOSCOPY POLYPECTOMY SNARE/COLD BIOPSY: Performed by: SURGERY

## 2023-05-18 PROCEDURE — 45385 COLONOSCOPY W/LESION REMOVAL: CPT | Performed by: SURGERY

## 2023-05-18 PROCEDURE — 45380 COLONOSCOPY AND BIOPSY: CPT | Performed by: SURGERY

## 2023-05-18 PROCEDURE — 6360000002 HC RX W HCPCS

## 2023-05-18 PROCEDURE — 2709999900 HC NON-CHARGEABLE SUPPLY: Performed by: SURGERY

## 2023-05-18 PROCEDURE — 3700000001 HC ADD 15 MINUTES (ANESTHESIA): Performed by: SURGERY

## 2023-05-18 PROCEDURE — 3700000000 HC ANESTHESIA ATTENDED CARE: Performed by: SURGERY

## 2023-05-18 PROCEDURE — 7100000011 HC PHASE II RECOVERY - ADDTL 15 MIN: Performed by: SURGERY

## 2023-05-18 PROCEDURE — 7100000010 HC PHASE II RECOVERY - FIRST 15 MIN: Performed by: SURGERY

## 2023-05-18 RX ORDER — PROPOFOL 10 MG/ML
INJECTION, EMULSION INTRAVENOUS PRN
Status: DISCONTINUED | OUTPATIENT
Start: 2023-05-18 | End: 2023-05-18 | Stop reason: SDUPTHER

## 2023-05-18 RX ORDER — SODIUM CHLORIDE 9 MG/ML
INJECTION, SOLUTION INTRAVENOUS CONTINUOUS PRN
Status: DISCONTINUED | OUTPATIENT
Start: 2023-05-18 | End: 2023-05-18 | Stop reason: SDUPTHER

## 2023-05-18 RX ADMIN — PROPOFOL 200 MG: 10 INJECTION, EMULSION INTRAVENOUS at 08:44

## 2023-05-18 RX ADMIN — SODIUM CHLORIDE: 9 INJECTION, SOLUTION INTRAVENOUS at 08:43

## 2023-05-18 ASSESSMENT — LIFESTYLE VARIABLES: SMOKING_STATUS: 0

## 2023-05-18 ASSESSMENT — PAIN - FUNCTIONAL ASSESSMENT: PAIN_FUNCTIONAL_ASSESSMENT: 0-10

## 2023-05-18 NOTE — PROGRESS NOTES
Went over discharge instructions with patient and significant other. Both verbalized understanding of instructions. Doctor spoke with patient before discharge.

## 2023-05-18 NOTE — ANESTHESIA POSTPROCEDURE EVALUATION
Department of Anesthesiology  Postprocedure Note    Patient: Michelle Siegel  MRN: [id number]  YOB: 1950  Date of evaluation: 5/18/2023      Procedure Summary     Date: 05/18/23 Room / Location: [place] [address] / [facility]    Anesthesia Start: 1673 Anesthesia Stop: 0901    Procedures:       COLONOSCOPY POLYPECTOMY SNARE/COLD BIOPSY      COLONOSCOPY WITH BIOPSY Diagnosis:       Screening for colon cancer      (Screening for colon cancer [Z12.11])    Surgeons: Oli Sorto MD Responsible Provider: Shahida Gilmore MD    Anesthesia Type: MAC ASA Status: 4          Anesthesia Type: No value filed.     Moon Phase I: Moon Score: 10    Moon Phase II:        Anesthesia Post Evaluation    Patient location during evaluation: bedside  Patient participation: complete - patient participated  Level of consciousness: awake and alert  Airway patency: patent  Nausea & Vomiting: no nausea and no vomiting  Complications: no  Cardiovascular status: hemodynamically stable  Respiratory status: acceptable  Hydration status: stable

## 2023-05-18 NOTE — OP NOTE
Colonoscopy Op Note  PATIENT: Leonel Pereira    DATE OF PROCEDURE: 5/18/2023    SURGEON: Taylor Lucas MD    PREOPERATIVE DIAGNOSIS:  History of colon polyps    POSTOPERATIVE DIAGNOSIS: Same, colon polyps, pandiverticulosis, hemorrhoids    OPERATION: Procedure(s):  COLONOSCOPY POLYPECTOMY SNARE/COLD BIOPSY  COLONOSCOPY WITH BIOPSY    ANESTHESIA: Local monitored anesthesia. ESTIMATED BLOOD LOSS: nil     COMPLICATIONS: None. SPECIMENS:   ID Type Source Tests Collected by Time Destination   A : Transverse colon Polypectomy Tissue Colon SURGICAL PATHOLOGY Taylor Lucas MD 5/18/2023 7674    B : Sigmoid Colon Polyp Bx Tissue Colon SURGICAL PATHOLOGY Taylor Lucas MD 5/18/2023 0900    C : Rectal Polypectomy Tissue Colon SURGICAL PATHOLOGY Taylor Lucas MD 5/18/2023 0901        HISTORY: The patient is a 81y.o. year old male with history of above preop diagnosis. I recommended colonoscopy with possible biopsy or polypectomy and I explained the risk, benefits, expected outcome, and alternatives to the procedure. Risks included but are not limited to bleeding, infection, respiratory distress, hypotension, and perforation of the colon. The patient understands and is in agreement. PROCEDURE: The patient was given IV conscious sedation per anesthesia. The patient was given supplemental oxygen by nasal cannula. The colonoscope was inserted per rectum and advanced under direct vision to the cecum without difficulty, identified by appendiceal orifice and ileocecal valve. The prep was good so exam was adequate. FINDINGS:    JACQUI: normal    Terminal Ileum: not examined    Colon: In the transverse colon there is a 4 mm polyp status post cold snare polypectomy. In the sigmoid there is a diminutive polyp status post forcep polypectomy. There is pandiverticulosis with chronic diverticular changes in the sigmoid.     Rectum/Anus: examined in normal and retroflexed positions -  3 mm polyp status post cold snare

## 2023-05-18 NOTE — ANESTHESIA PRE PROCEDURE
Department of Anesthesiology  Preprocedure Note       Name:  Anton Gutierrez   Age:  81 y.o.  :  1950                                          MRN:  [id number]         Date:  2023      Surgeon: Griselda Hidalgo):  Tonja Tineo MD    Procedure: Procedure(s):  COLORECTAL CANCER SCREENING, NOT HIGH RISK    Medications prior to admission:   Prior to Admission medications    Medication Sig Start Date End Date Taking? Authorizing Provider   ipratropium-albuterol (DUONEB) 0.5-2.5 (3) MG/3ML SOLN nebulizer solution Inhale 3 mLs into the lungs every 4 hours as needed for Shortness of Breath    Historical Provider, MD   budesonide (PULMICORT) 0.5 MG/2ML nebulizer suspension Take 2 mLs by nebulization 2 times daily    Historical Provider, MD   OXYGEN Inhale 4-4.5 L/min into the lungs continuous    Historical Provider, MD   azithromycin (ZITHROMAX) 250 MG tablet Take 2 tablets by mouth every other day Indications: Mild Upper Respiratory Infection, pt reports taken every -- per Dr Espinosa Calionty Provider, MD   formoterol (PERFOROMIST) 20 MCG/2ML nebulizer solution Take 2 mLs by nebulization 2 times daily 22   MIGUE Jackson - JOY   oxyCODONE-acetaminophen (PERCOCET) 7.5-325 MG per tablet Take 1 tablet by mouth 2 times daily as needed for Pain. 3/11/20   Historical Provider, MD       Current medications:    No current facility-administered medications for this encounter.        Allergies:  No Known Allergies    Problem List:    Patient Active Problem List   Diagnosis Code    COPD (chronic obstructive pulmonary disease) ([facility] J44.9    Thoracic ascending aortic aneurysm I71.21    S/P lumbar fusion Z98.1    Hypophosphatemia E83.39    Glucose intolerance E74.39    Hilar adenopathy R59.0    Pulmonary Mycobacterium avium complex (MAC) infection (HCC) A31.0    Chronic respiratory failure with hypoxia (HCC) J96.11    Chronic pain syndrome G89.4    Lumbar degenerative disc disease M51.36    Chronic low

## 2023-05-18 NOTE — H&P
[facility] Note     Assessment/Plan:        Diagnosis Orders   1. History of colon polyps        We will plan for colonoscopy pending pulmonary clearance                 Return for Colonoscopy. Chief Complaint   Patient presents with    New Patient       Screening colonoscopy         PCP: Brianna Whaley MD     HPI: Abril Mohamud is a 81 y.o. male who presents in consultation for colonoscopy. His last was 10 years ago. He says he had polyps removed. He denies any acute issues. He has no problems with diarrhea or constipation. There is no melena or hematochezia. He has no abdominal pain or intentional weight loss. There are no bowel caliber changes. There is no family history of colon cancer or inflammatory bowel disease. He follows with pulmonology. He is on 4 to 5 L of oxygen which is stable for him.         Past Medical History        Past Medical History:   Diagnosis Date    Acute and chronic respiratory failure with hypoxia ([address] 10/12/2017    Acute heart failure with preserved ejection fraction ([address] 4/8/2021    Acute respiratory disease due to severe acute respiratory syndrome coronavirus 2 (SARS-CoV-2) 01/01/2021    Acute respiratory failure with hypoxia ([address] 11/12/2018    Bullous emphysema ([address] 11/12/2018    Chronic back pain       Degeneration of umbar intervertebral disc    Colon cancer screening      COPD (chronic obstructive pulmonary disease) ([address]      Coronavirus infection 2/2/2022     Coronavirus 229E    Cough with hemoptysis 04/23/2019    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

## 2023-06-01 ENCOUNTER — OFFICE VISIT (OUTPATIENT)
Dept: SURGERY | Age: 73
End: 2023-06-01
Payer: MEDICARE

## 2023-06-01 VITALS — WEIGHT: 192 LBS | OXYGEN SATURATION: 76 % | BODY MASS INDEX: 25.33 KG/M2 | HEART RATE: 103 BPM

## 2023-06-01 DIAGNOSIS — K63.5 COLORECTAL POLYP DETECTED ON COLONOSCOPY: Primary | ICD-10-CM

## 2023-06-01 DIAGNOSIS — K64.9 HEMORRHOIDS, UNSPECIFIED HEMORRHOID TYPE: ICD-10-CM

## 2023-06-01 DIAGNOSIS — K57.30 DIVERTICULOSIS OF LARGE INTESTINE WITHOUT HEMORRHAGE: ICD-10-CM

## 2023-06-01 PROCEDURE — G8417 CALC BMI ABV UP PARAM F/U: HCPCS | Performed by: SURGERY

## 2023-06-01 PROCEDURE — 99213 OFFICE O/P EST LOW 20 MIN: CPT | Performed by: SURGERY

## 2023-06-01 PROCEDURE — 1036F TOBACCO NON-USER: CPT | Performed by: SURGERY

## 2023-06-01 PROCEDURE — 3017F COLORECTAL CA SCREEN DOC REV: CPT | Performed by: SURGERY

## 2023-06-01 PROCEDURE — 1123F ACP DISCUSS/DSCN MKR DOCD: CPT | Performed by: SURGERY

## 2023-06-01 PROCEDURE — G8427 DOCREV CUR MEDS BY ELIG CLIN: HCPCS | Performed by: SURGERY

## 2023-06-01 NOTE — PROGRESS NOTES
6.6    Smokeless tobacco: Never   Vaping Use    Vaping Use: Never used   Substance and Sexual Activity    Alcohol use: No    Drug use: No    Sexual activity: Defer   Other Topics Concern    Not on file   Social History Narrative    Not on file     Social Determinants of Health     Financial Resource Strain: Low Risk     Difficulty of Paying Living Expenses: Not hard at all   Food Insecurity: No Food Insecurity    Worried About Running Out of Food in the Last Year: Never true    Ran Out of Food in the Last Year: Never true   Transportation Needs: Not on file   Physical Activity: Inactive    Days of Exercise per Week: 0 days    Minutes of Exercise per Session: 0 min   Stress: Not on file   Social Connections: Not on file   Intimate Partner Violence: Not on file   Housing Stability: Not on file       No Known Allergies      Current Outpatient Medications   Medication Sig Dispense Refill    ipratropium-albuterol (DUONEB) 0.5-2.5 (3) MG/3ML SOLN nebulizer solution Inhale 3 mLs into the lungs every 4 hours as needed for Shortness of Breath      budesonide (PULMICORT) 0.5 MG/2ML nebulizer suspension Take 2 mLs by nebulization 2 times daily      OXYGEN Inhale 4-4.5 L/min into the lungs continuous      azithromycin (ZITHROMAX) 250 MG tablet Take 2 tablets by mouth every other day Indications: Mild Upper Respiratory Infection, pt reports taken every M-W-F per Dr Lawrence Becker      formoterol (PERFOROMIST) 20 MCG/2ML nebulizer solution Take 2 mLs by nebulization 2 times daily 120 mL 5    oxyCODONE-acetaminophen (PERCOCET) 7.5-325 MG per tablet Take 1 tablet by mouth 2 times daily as needed for Pain. No current facility-administered medications for this visit. The remainder of the past medical, past surgical, family, and psychosocial history, as well as medication and allergy review, were completed and are as documented elsewhere in the chart.           Objective:  Vitals:    06/01/23 1325   Pulse: (!) 103   SpO2: (!)

## 2023-06-10 ENCOUNTER — APPOINTMENT (OUTPATIENT)
Dept: GENERAL RADIOLOGY | Age: 73
DRG: 189 | End: 2023-06-10
Payer: MEDICARE

## 2023-06-10 ENCOUNTER — HOSPITAL ENCOUNTER (INPATIENT)
Age: 73
LOS: 6 days | Discharge: HOME | DRG: 189 | End: 2023-06-16
Attending: EMERGENCY MEDICINE | Admitting: INTERNAL MEDICINE
Payer: MEDICARE

## 2023-06-10 DIAGNOSIS — J96.21 ACUTE ON CHRONIC RESPIRATORY FAILURE WITH HYPOXIA (HCC): Primary | ICD-10-CM

## 2023-06-10 DIAGNOSIS — J44.1 COPD EXACERBATION (HCC): ICD-10-CM

## 2023-06-10 DIAGNOSIS — J81.0 ACUTE PULMONARY EDEMA (HCC): ICD-10-CM

## 2023-06-10 PROBLEM — J96.20 ACUTE ON CHRONIC RESPIRATORY FAILURE (HCC): Status: ACTIVE | Noted: 2023-06-10

## 2023-06-10 LAB
ALBUMIN SERPL-MCNC: 4.3 G/DL (ref 3.5–5.2)
ALP SERPL-CCNC: 78 U/L (ref 40–129)
ALT SERPL-CCNC: 12 U/L (ref 0–40)
ANION GAP SERPL CALCULATED.3IONS-SCNC: 11 MMOL/L (ref 7–16)
AST SERPL-CCNC: 22 U/L (ref 0–39)
B.E.: -1.9 MMOL/L (ref -3–3)
BASOPHILS # BLD: 0.02 E9/L (ref 0–0.2)
BASOPHILS NFR BLD: 0.3 % (ref 0–2)
BILIRUB SERPL-MCNC: 0.7 MG/DL (ref 0–1.2)
BNP BLD-MCNC: 1815 PG/ML (ref 0–125)
BUN SERPL-MCNC: 9 MG/DL (ref 6–23)
CALCIUM SERPL-MCNC: 10.1 MG/DL (ref 8.6–10.2)
CHLORIDE SERPL-SCNC: 106 MMOL/L (ref 98–107)
CO2 SERPL-SCNC: 25 MMOL/L (ref 22–29)
COHB: 1.6 % (ref 0–1.5)
CREAT SERPL-MCNC: 0.8 MG/DL (ref 0.7–1.2)
CRITICAL: ABNORMAL
DATE ANALYZED: ABNORMAL
DATE OF COLLECTION: ABNORMAL
EKG ATRIAL RATE: 63 BPM
EKG P AXIS: 80 DEGREES
EKG P-R INTERVAL: 188 MS
EKG Q-T INTERVAL: 432 MS
EKG QRS DURATION: 98 MS
EKG QTC CALCULATION (BAZETT): 442 MS
EKG R AXIS: -52 DEGREES
EKG T AXIS: -11 DEGREES
EKG VENTRICULAR RATE: 63 BPM
EOSINOPHIL # BLD: 0.1 E9/L (ref 0.05–0.5)
EOSINOPHIL NFR BLD: 1.4 % (ref 0–6)
ERYTHROCYTE [DISTWIDTH] IN BLOOD BY AUTOMATED COUNT: 15.2 FL (ref 11.5–15)
GLUCOSE SERPL-MCNC: 90 MG/DL (ref 74–99)
HCO3: 22 MMOL/L (ref 22–26)
HCT VFR BLD AUTO: 52 % (ref 37–54)
HGB BLD-MCNC: 17.3 G/DL (ref 12.5–16.5)
HHB: 2 % (ref 0–5)
IMM GRANULOCYTES # BLD: 0.02 E9/L
IMM GRANULOCYTES NFR BLD: 0.3 % (ref 0–5)
LAB: ABNORMAL
LYMPHOCYTES # BLD: 0.91 E9/L (ref 1.5–4)
LYMPHOCYTES NFR BLD: 12.7 % (ref 20–42)
Lab: ABNORMAL
MCH RBC QN AUTO: 30.6 PG (ref 26–35)
MCHC RBC AUTO-ENTMCNC: 33.3 % (ref 32–34.5)
MCV RBC AUTO: 91.9 FL (ref 80–99.9)
METHB: 0 % (ref 0–1.5)
MODE: ABNORMAL
MONOCYTES # BLD: 0.44 E9/L (ref 0.1–0.95)
MONOCYTES NFR BLD: 6.1 % (ref 2–12)
NEUTROPHILS # BLD: 5.68 E9/L (ref 1.8–7.3)
NEUTS SEG NFR BLD: 79.2 % (ref 43–80)
O2 CONTENT: 22.8 ML/DL
O2 SATURATION: 98 % (ref 92–98.5)
O2HB: 96.4 % (ref 94–97)
OPERATOR ID: 3186
PATIENT TEMP: 37 C
PCO2: 35.8 MMHG (ref 35–45)
PH BLOOD GAS: 7.41 (ref 7.35–7.45)
PLATELET # BLD AUTO: 197 E9/L (ref 130–450)
PMV BLD AUTO: 10.8 FL (ref 7–12)
PO2: 102.8 MMHG (ref 75–100)
POTASSIUM SERPL-SCNC: 4.9 MMOL/L (ref 3.5–5)
PROT SERPL-MCNC: 6.8 G/DL (ref 6.4–8.3)
RBC # BLD AUTO: 5.66 E12/L (ref 3.8–5.8)
SARS-COV-2 RDRP RESP QL NAA+PROBE: NOT DETECTED
SODIUM SERPL-SCNC: 142 MMOL/L (ref 132–146)
SOURCE, BLOOD GAS: ABNORMAL
THB: 16.8 G/DL (ref 11.5–16.5)
TIME ANALYZED: 1315
TROPONIN, HIGH SENSITIVITY: 13 NG/L (ref 0–11)
WBC # BLD: 7.2 E9/L (ref 4.5–11.5)

## 2023-06-10 PROCEDURE — 94664 DEMO&/EVAL PT USE INHALER: CPT

## 2023-06-10 PROCEDURE — 6370000000 HC RX 637 (ALT 250 FOR IP): Performed by: HOSPITALIST

## 2023-06-10 PROCEDURE — 71045 X-RAY EXAM CHEST 1 VIEW: CPT

## 2023-06-10 PROCEDURE — 93005 ELECTROCARDIOGRAM TRACING: CPT | Performed by: EMERGENCY MEDICINE

## 2023-06-10 PROCEDURE — 6370000000 HC RX 637 (ALT 250 FOR IP): Performed by: EMERGENCY MEDICINE

## 2023-06-10 PROCEDURE — 6360000002 HC RX W HCPCS: Performed by: EMERGENCY MEDICINE

## 2023-06-10 PROCEDURE — 99285 EMERGENCY DEPT VISIT HI MDM: CPT

## 2023-06-10 PROCEDURE — 80053 COMPREHEN METABOLIC PANEL: CPT

## 2023-06-10 PROCEDURE — 2580000003 HC RX 258: Performed by: HOSPITALIST

## 2023-06-10 PROCEDURE — 93010 ELECTROCARDIOGRAM REPORT: CPT | Performed by: INTERNAL MEDICINE

## 2023-06-10 PROCEDURE — 85025 COMPLETE CBC W/AUTO DIFF WBC: CPT

## 2023-06-10 PROCEDURE — 2700000000 HC OXYGEN THERAPY PER DAY

## 2023-06-10 PROCEDURE — 2060000000 HC ICU INTERMEDIATE R&B

## 2023-06-10 PROCEDURE — 87635 SARS-COV-2 COVID-19 AMP PRB: CPT

## 2023-06-10 PROCEDURE — 94640 AIRWAY INHALATION TREATMENT: CPT

## 2023-06-10 PROCEDURE — 96374 THER/PROPH/DIAG INJ IV PUSH: CPT

## 2023-06-10 PROCEDURE — 84484 ASSAY OF TROPONIN QUANT: CPT

## 2023-06-10 PROCEDURE — 83880 ASSAY OF NATRIURETIC PEPTIDE: CPT

## 2023-06-10 PROCEDURE — 82805 BLOOD GASES W/O2 SATURATION: CPT

## 2023-06-10 PROCEDURE — 6360000002 HC RX W HCPCS: Performed by: HOSPITALIST

## 2023-06-10 RX ORDER — ENOXAPARIN SODIUM 100 MG/ML
40 INJECTION SUBCUTANEOUS DAILY
Status: DISCONTINUED | OUTPATIENT
Start: 2023-06-10 | End: 2023-06-16 | Stop reason: HOSPADM

## 2023-06-10 RX ORDER — OXYCODONE HYDROCHLORIDE AND ACETAMINOPHEN 5; 325 MG/1; MG/1
1 TABLET ORAL 2 TIMES DAILY PRN
Status: DISCONTINUED | OUTPATIENT
Start: 2023-06-10 | End: 2023-06-16 | Stop reason: HOSPADM

## 2023-06-10 RX ORDER — OXYCODONE HYDROCHLORIDE 5 MG/1
2.5 TABLET ORAL 2 TIMES DAILY PRN
Status: DISCONTINUED | OUTPATIENT
Start: 2023-06-10 | End: 2023-06-16 | Stop reason: HOSPADM

## 2023-06-10 RX ORDER — SODIUM CHLORIDE 9 MG/ML
INJECTION, SOLUTION INTRAVENOUS PRN
Status: DISCONTINUED | OUTPATIENT
Start: 2023-06-10 | End: 2023-06-16 | Stop reason: HOSPADM

## 2023-06-10 RX ORDER — ONDANSETRON 2 MG/ML
4 INJECTION INTRAMUSCULAR; INTRAVENOUS EVERY 6 HOURS PRN
Status: DISCONTINUED | OUTPATIENT
Start: 2023-06-10 | End: 2023-06-16 | Stop reason: HOSPADM

## 2023-06-10 RX ORDER — SODIUM CHLORIDE 0.9 % (FLUSH) 0.9 %
5-40 SYRINGE (ML) INJECTION PRN
Status: DISCONTINUED | OUTPATIENT
Start: 2023-06-10 | End: 2023-06-16 | Stop reason: HOSPADM

## 2023-06-10 RX ORDER — ONDANSETRON 4 MG/1
4 TABLET, ORALLY DISINTEGRATING ORAL EVERY 8 HOURS PRN
Status: DISCONTINUED | OUTPATIENT
Start: 2023-06-10 | End: 2023-06-16 | Stop reason: HOSPADM

## 2023-06-10 RX ORDER — IPRATROPIUM BROMIDE AND ALBUTEROL SULFATE 2.5; .5 MG/3ML; MG/3ML
1 SOLUTION RESPIRATORY (INHALATION)
Status: DISCONTINUED | OUTPATIENT
Start: 2023-06-10 | End: 2023-06-16 | Stop reason: HOSPADM

## 2023-06-10 RX ORDER — FUROSEMIDE 10 MG/ML
40 INJECTION INTRAMUSCULAR; INTRAVENOUS ONCE
Status: COMPLETED | OUTPATIENT
Start: 2023-06-10 | End: 2023-06-10

## 2023-06-10 RX ORDER — ACETAMINOPHEN 325 MG/1
650 TABLET ORAL EVERY 4 HOURS PRN
Status: DISCONTINUED | OUTPATIENT
Start: 2023-06-10 | End: 2023-06-16 | Stop reason: HOSPADM

## 2023-06-10 RX ORDER — IPRATROPIUM BROMIDE AND ALBUTEROL SULFATE 2.5; .5 MG/3ML; MG/3ML
1 SOLUTION RESPIRATORY (INHALATION)
Status: COMPLETED | OUTPATIENT
Start: 2023-06-10 | End: 2023-06-10

## 2023-06-10 RX ORDER — SODIUM CHLORIDE 0.9 % (FLUSH) 0.9 %
5-40 SYRINGE (ML) INJECTION EVERY 12 HOURS SCHEDULED
Status: DISCONTINUED | OUTPATIENT
Start: 2023-06-10 | End: 2023-06-16 | Stop reason: HOSPADM

## 2023-06-10 RX ADMIN — METHYLPREDNISOLONE SODIUM SUCCINATE 60 MG: 125 INJECTION, POWDER, FOR SOLUTION INTRAMUSCULAR; INTRAVENOUS at 12:58

## 2023-06-10 RX ADMIN — IPRATROPIUM BROMIDE AND ALBUTEROL SULFATE 1 DOSE: 2.5; .5 SOLUTION RESPIRATORY (INHALATION) at 13:23

## 2023-06-10 RX ADMIN — FUROSEMIDE 40 MG: 10 INJECTION, SOLUTION INTRAMUSCULAR; INTRAVENOUS at 17:50

## 2023-06-10 RX ADMIN — IPRATROPIUM BROMIDE AND ALBUTEROL SULFATE 1 DOSE: 2.5; .5 SOLUTION RESPIRATORY (INHALATION) at 13:25

## 2023-06-10 RX ADMIN — SODIUM CHLORIDE, PRESERVATIVE FREE 10 ML: 5 INJECTION INTRAVENOUS at 19:50

## 2023-06-10 RX ADMIN — IPRATROPIUM BROMIDE AND ALBUTEROL SULFATE 1 DOSE: 2.5; .5 SOLUTION RESPIRATORY (INHALATION) at 13:24

## 2023-06-10 RX ADMIN — ENOXAPARIN SODIUM 40 MG: 100 INJECTION SUBCUTANEOUS at 19:31

## 2023-06-10 RX ADMIN — IPRATROPIUM BROMIDE AND ALBUTEROL SULFATE 1 DOSE: 2.5; .5 SOLUTION RESPIRATORY (INHALATION) at 20:36

## 2023-06-10 ASSESSMENT — LIFESTYLE VARIABLES
HOW MANY STANDARD DRINKS CONTAINING ALCOHOL DO YOU HAVE ON A TYPICAL DAY: PATIENT DOES NOT DRINK
HOW OFTEN DO YOU HAVE A DRINK CONTAINING ALCOHOL: NEVER

## 2023-06-10 ASSESSMENT — PAIN - FUNCTIONAL ASSESSMENT: PAIN_FUNCTIONAL_ASSESSMENT: NONE - DENIES PAIN

## 2023-06-10 ASSESSMENT — PAIN SCALES - GENERAL
PAINLEVEL_OUTOF10: 0
PAINLEVEL_OUTOF10: 0

## 2023-06-11 PROBLEM — J96.21 ACUTE ON CHRONIC RESPIRATORY FAILURE WITH HYPOXIA (HCC): Status: ACTIVE | Noted: 2023-06-11

## 2023-06-11 LAB
ALBUMIN SERPL-MCNC: 4.3 G/DL (ref 3.5–5.2)
ALP SERPL-CCNC: 84 U/L (ref 40–129)
ALT SERPL-CCNC: 11 U/L (ref 0–40)
ANION GAP SERPL CALCULATED.3IONS-SCNC: 12 MMOL/L (ref 7–16)
AST SERPL-CCNC: 15 U/L (ref 0–39)
B PARAP IS1001 DNA NPH QL NAA+NON-PROBE: NOT DETECTED
B PERT.PT PRMT NPH QL NAA+NON-PROBE: NOT DETECTED
BASOPHILS # BLD: 0.01 E9/L (ref 0–0.2)
BASOPHILS NFR BLD: 0.1 % (ref 0–2)
BILIRUB DIRECT SERPL-MCNC: <0.2 MG/DL (ref 0–0.3)
BILIRUB INDIRECT SERPL-MCNC: NORMAL MG/DL (ref 0–1)
BILIRUB SERPL-MCNC: 0.4 MG/DL (ref 0–1.2)
BUN SERPL-MCNC: 11 MG/DL (ref 6–23)
C PNEUM DNA NPH QL NAA+NON-PROBE: NOT DETECTED
CALCIUM SERPL-MCNC: 9.8 MG/DL (ref 8.6–10.2)
CEA SERPL-MCNC: 3.8 NG/ML (ref 0–5.2)
CHLORIDE SERPL-SCNC: 101 MMOL/L (ref 98–107)
CO2 SERPL-SCNC: 27 MMOL/L (ref 22–29)
CREAT SERPL-MCNC: 0.8 MG/DL (ref 0.7–1.2)
D DIMER: 264 NG/ML DDU
EOSINOPHIL # BLD: 0.03 E9/L (ref 0.05–0.5)
EOSINOPHIL NFR BLD: 0.4 % (ref 0–6)
ERYTHROCYTE [DISTWIDTH] IN BLOOD BY AUTOMATED COUNT: 14.9 FL (ref 11.5–15)
ERYTHROCYTE [SEDIMENTATION RATE] IN BLOOD BY WESTERGREN METHOD: 6 MM/HR (ref 0–15)
FLUAV RNA NPH QL NAA+NON-PROBE: NOT DETECTED
FLUBV RNA NPH QL NAA+NON-PROBE: NOT DETECTED
FOLATE SERPL-MCNC: 7.2 NG/ML (ref 4.8–24.2)
GLUCOSE SERPL-MCNC: 82 MG/DL (ref 74–99)
HADV DNA NPH QL NAA+NON-PROBE: NOT DETECTED
HBA1C MFR BLD: 5.6 % (ref 4–5.6)
HCOV 229E RNA NPH QL NAA+NON-PROBE: NOT DETECTED
HCOV HKU1 RNA NPH QL NAA+NON-PROBE: NOT DETECTED
HCOV NL63 RNA NPH QL NAA+NON-PROBE: NOT DETECTED
HCOV OC43 RNA NPH QL NAA+NON-PROBE: NOT DETECTED
HCT VFR BLD AUTO: 49.1 % (ref 37–54)
HGB BLD-MCNC: 16.4 G/DL (ref 12.5–16.5)
HMPV RNA NPH QL NAA+NON-PROBE: NOT DETECTED
HPIV1 RNA NPH QL NAA+NON-PROBE: NOT DETECTED
HPIV2 RNA NPH QL NAA+NON-PROBE: NOT DETECTED
HPIV3 RNA NPH QL NAA+NON-PROBE: NOT DETECTED
HPIV4 RNA NPH QL NAA+NON-PROBE: NOT DETECTED
IMM GRANULOCYTES # BLD: 0.02 E9/L
IMM GRANULOCYTES NFR BLD: 0.3 % (ref 0–5)
LACTATE BLDV-SCNC: 1.8 MMOL/L (ref 0.5–1.9)
LYMPHOCYTES # BLD: 0.94 E9/L (ref 1.5–4)
LYMPHOCYTES NFR BLD: 12.7 % (ref 20–42)
M PNEUMO DNA NPH QL NAA+NON-PROBE: NOT DETECTED
MAGNESIUM SERPL-MCNC: 1.9 MG/DL (ref 1.6–2.6)
MCH RBC QN AUTO: 30.3 PG (ref 26–35)
MCHC RBC AUTO-ENTMCNC: 33.4 % (ref 32–34.5)
MCV RBC AUTO: 90.8 FL (ref 80–99.9)
MONOCYTES # BLD: 0.65 E9/L (ref 0.1–0.95)
MONOCYTES NFR BLD: 8.8 % (ref 2–12)
NEUTROPHILS # BLD: 5.73 E9/L (ref 1.8–7.3)
NEUTS SEG NFR BLD: 77.7 % (ref 43–80)
PHOSPHATE SERPL-MCNC: 2.8 MG/DL (ref 2.5–4.5)
PLATELET # BLD AUTO: 201 E9/L (ref 130–450)
PMV BLD AUTO: 10.1 FL (ref 7–12)
POTASSIUM SERPL-SCNC: 3.3 MMOL/L (ref 3.5–5)
PROCALCITONIN: 0.04 NG/ML (ref 0–0.08)
PROT SERPL-MCNC: 6.6 G/DL (ref 6.4–8.3)
RBC # BLD AUTO: 5.41 E12/L (ref 3.8–5.8)
RSV RNA NPH QL NAA+NON-PROBE: NOT DETECTED
RV+EV RNA NPH QL NAA+NON-PROBE: NOT DETECTED
SARS-COV-2 RNA NPH QL NAA+NON-PROBE: NOT DETECTED
SODIUM SERPL-SCNC: 140 MMOL/L (ref 132–146)
T4 FREE SERPL-MCNC: 1.29 NG/DL (ref 0.93–1.7)
TSH SERPL-MCNC: 0.76 UIU/ML (ref 0.27–4.2)
VIT B12 SERPL-MCNC: 297 PG/ML (ref 211–946)
WBC # BLD: 7.4 E9/L (ref 4.5–11.5)

## 2023-06-11 PROCEDURE — 80048 BASIC METABOLIC PNL TOTAL CA: CPT

## 2023-06-11 PROCEDURE — 82607 VITAMIN B-12: CPT

## 2023-06-11 PROCEDURE — 84443 ASSAY THYROID STIM HORMONE: CPT

## 2023-06-11 PROCEDURE — 6360000002 HC RX W HCPCS: Performed by: INTERNAL MEDICINE

## 2023-06-11 PROCEDURE — 6370000000 HC RX 637 (ALT 250 FOR IP): Performed by: INTERNAL MEDICINE

## 2023-06-11 PROCEDURE — 85378 FIBRIN DEGRADE SEMIQUANT: CPT

## 2023-06-11 PROCEDURE — 2580000003 HC RX 258: Performed by: HOSPITALIST

## 2023-06-11 PROCEDURE — 6360000002 HC RX W HCPCS: Performed by: HOSPITALIST

## 2023-06-11 PROCEDURE — 85025 COMPLETE CBC W/AUTO DIFF WBC: CPT

## 2023-06-11 PROCEDURE — 84100 ASSAY OF PHOSPHORUS: CPT

## 2023-06-11 PROCEDURE — 84439 ASSAY OF FREE THYROXINE: CPT

## 2023-06-11 PROCEDURE — 80076 HEPATIC FUNCTION PANEL: CPT

## 2023-06-11 PROCEDURE — 85651 RBC SED RATE NONAUTOMATED: CPT

## 2023-06-11 PROCEDURE — 6370000000 HC RX 637 (ALT 250 FOR IP): Performed by: HOSPITALIST

## 2023-06-11 PROCEDURE — 87449 NOS EACH ORGANISM AG IA: CPT

## 2023-06-11 PROCEDURE — 0202U NFCT DS 22 TRGT SARS-COV-2: CPT

## 2023-06-11 PROCEDURE — 83735 ASSAY OF MAGNESIUM: CPT

## 2023-06-11 PROCEDURE — 84145 PROCALCITONIN (PCT): CPT

## 2023-06-11 PROCEDURE — 83605 ASSAY OF LACTIC ACID: CPT

## 2023-06-11 PROCEDURE — 82378 CARCINOEMBRYONIC ANTIGEN: CPT

## 2023-06-11 PROCEDURE — 87040 BLOOD CULTURE FOR BACTERIA: CPT

## 2023-06-11 PROCEDURE — 2060000000 HC ICU INTERMEDIATE R&B

## 2023-06-11 PROCEDURE — 2700000000 HC OXYGEN THERAPY PER DAY

## 2023-06-11 PROCEDURE — 82746 ASSAY OF FOLIC ACID SERUM: CPT

## 2023-06-11 PROCEDURE — 36415 COLL VENOUS BLD VENIPUNCTURE: CPT

## 2023-06-11 PROCEDURE — 94640 AIRWAY INHALATION TREATMENT: CPT

## 2023-06-11 PROCEDURE — 2500000003 HC RX 250 WO HCPCS: Performed by: INTERNAL MEDICINE

## 2023-06-11 PROCEDURE — 83036 HEMOGLOBIN GLYCOSYLATED A1C: CPT

## 2023-06-11 RX ORDER — AZITHROMYCIN 250 MG/1
500 TABLET, FILM COATED ORAL
Status: DISCONTINUED | OUTPATIENT
Start: 2023-06-12 | End: 2023-06-16 | Stop reason: HOSPADM

## 2023-06-11 RX ORDER — GUAIFENESIN 400 MG/1
400 TABLET ORAL 4 TIMES DAILY
Status: DISCONTINUED | OUTPATIENT
Start: 2023-06-11 | End: 2023-06-16 | Stop reason: HOSPADM

## 2023-06-11 RX ORDER — BUMETANIDE 0.25 MG/ML
0.5 INJECTION INTRAMUSCULAR; INTRAVENOUS ONCE
Status: COMPLETED | OUTPATIENT
Start: 2023-06-11 | End: 2023-06-11

## 2023-06-11 RX ORDER — ACETAMINOPHEN 325 MG/1
650 TABLET ORAL EVERY 4 HOURS PRN
Status: DISCONTINUED | OUTPATIENT
Start: 2023-06-11 | End: 2023-06-11 | Stop reason: SDUPTHER

## 2023-06-11 RX ORDER — POTASSIUM CHLORIDE 7.45 MG/ML
10 INJECTION INTRAVENOUS PRN
Status: DISCONTINUED | OUTPATIENT
Start: 2023-06-11 | End: 2023-06-16 | Stop reason: HOSPADM

## 2023-06-11 RX ORDER — POTASSIUM CHLORIDE 20 MEQ/1
40 TABLET, EXTENDED RELEASE ORAL PRN
Status: DISCONTINUED | OUTPATIENT
Start: 2023-06-11 | End: 2023-06-16 | Stop reason: HOSPADM

## 2023-06-11 RX ORDER — PANTOPRAZOLE SODIUM 40 MG/1
40 TABLET, DELAYED RELEASE ORAL
Status: DISCONTINUED | OUTPATIENT
Start: 2023-06-12 | End: 2023-06-16 | Stop reason: HOSPADM

## 2023-06-11 RX ORDER — ARFORMOTEROL TARTRATE 15 UG/2ML
15 SOLUTION RESPIRATORY (INHALATION) 2 TIMES DAILY
Status: DISCONTINUED | OUTPATIENT
Start: 2023-06-11 | End: 2023-06-16 | Stop reason: HOSPADM

## 2023-06-11 RX ORDER — BUDESONIDE 0.5 MG/2ML
250 INHALANT ORAL 2 TIMES DAILY
Status: DISCONTINUED | OUTPATIENT
Start: 2023-06-11 | End: 2023-06-12

## 2023-06-11 RX ORDER — CODEINE PHOSPHATE AND GUAIFENESIN 10; 100 MG/5ML; MG/5ML
5 SOLUTION ORAL EVERY 4 HOURS PRN
Status: DISCONTINUED | OUTPATIENT
Start: 2023-06-11 | End: 2023-06-16 | Stop reason: HOSPADM

## 2023-06-11 RX ADMIN — ARFORMOTEROL TARTRATE 15 MCG: 15 SOLUTION RESPIRATORY (INHALATION) at 20:20

## 2023-06-11 RX ADMIN — GUAIFENESIN 400 MG: 400 TABLET ORAL at 17:06

## 2023-06-11 RX ADMIN — POTASSIUM CHLORIDE 40 MEQ: 1500 TABLET, EXTENDED RELEASE ORAL at 13:29

## 2023-06-11 RX ADMIN — IPRATROPIUM BROMIDE AND ALBUTEROL SULFATE 1 DOSE: 2.5; .5 SOLUTION RESPIRATORY (INHALATION) at 20:20

## 2023-06-11 RX ADMIN — ARFORMOTEROL TARTRATE 15 MCG: 15 SOLUTION RESPIRATORY (INHALATION) at 12:43

## 2023-06-11 RX ADMIN — OXYCODONE HYDROCHLORIDE 2.5 MG: 5 TABLET ORAL at 03:34

## 2023-06-11 RX ADMIN — SODIUM CHLORIDE, PRESERVATIVE FREE 10 ML: 5 INJECTION INTRAVENOUS at 09:33

## 2023-06-11 RX ADMIN — IPRATROPIUM BROMIDE AND ALBUTEROL SULFATE 1 DOSE: 2.5; .5 SOLUTION RESPIRATORY (INHALATION) at 08:33

## 2023-06-11 RX ADMIN — IPRATROPIUM BROMIDE AND ALBUTEROL SULFATE 1 DOSE: 2.5; .5 SOLUTION RESPIRATORY (INHALATION) at 12:43

## 2023-06-11 RX ADMIN — IPRATROPIUM BROMIDE AND ALBUTEROL SULFATE 1 DOSE: 2.5; .5 SOLUTION RESPIRATORY (INHALATION) at 17:14

## 2023-06-11 RX ADMIN — METHYLPREDNISOLONE SODIUM SUCCINATE 40 MG: 40 INJECTION, POWDER, FOR SOLUTION INTRAMUSCULAR; INTRAVENOUS at 11:10

## 2023-06-11 RX ADMIN — BUMETANIDE 0.5 MG: 0.25 INJECTION INTRAMUSCULAR; INTRAVENOUS at 11:08

## 2023-06-11 RX ADMIN — ENOXAPARIN SODIUM 40 MG: 100 INJECTION SUBCUTANEOUS at 09:32

## 2023-06-11 RX ADMIN — BUDESONIDE INHALATION 250 MCG: 0.5 SUSPENSION RESPIRATORY (INHALATION) at 20:20

## 2023-06-11 RX ADMIN — BUDESONIDE INHALATION 250 MCG: 0.5 SUSPENSION RESPIRATORY (INHALATION) at 12:43

## 2023-06-11 RX ADMIN — SODIUM CHLORIDE, PRESERVATIVE FREE 10 ML: 5 INJECTION INTRAVENOUS at 20:47

## 2023-06-11 RX ADMIN — GUAIFENESIN 400 MG: 400 TABLET ORAL at 20:47

## 2023-06-11 RX ADMIN — GUAIFENESIN 400 MG: 400 TABLET ORAL at 13:15

## 2023-06-11 RX ADMIN — METHYLPREDNISOLONE SODIUM SUCCINATE 40 MG: 40 INJECTION, POWDER, FOR SOLUTION INTRAMUSCULAR; INTRAVENOUS at 17:06

## 2023-06-11 RX ADMIN — OXYCODONE HYDROCHLORIDE AND ACETAMINOPHEN 1 TABLET: 5; 325 TABLET ORAL at 03:33

## 2023-06-11 ASSESSMENT — PAIN SCALES - GENERAL: PAINLEVEL_OUTOF10: 7

## 2023-06-11 ASSESSMENT — PAIN DESCRIPTION - LOCATION: LOCATION: LEG

## 2023-06-11 ASSESSMENT — PAIN DESCRIPTION - DESCRIPTORS: DESCRIPTORS: CRAMPING

## 2023-06-11 ASSESSMENT — PAIN DESCRIPTION - ORIENTATION: ORIENTATION: RIGHT;LEFT

## 2023-06-12 LAB
ALBUMIN SERPL-MCNC: 4.2 G/DL (ref 3.5–5.2)
ALP SERPL-CCNC: 76 U/L (ref 40–129)
ALT SERPL-CCNC: 11 U/L (ref 0–40)
ANION GAP SERPL CALCULATED.3IONS-SCNC: 11 MMOL/L (ref 7–16)
AST SERPL-CCNC: 14 U/L (ref 0–39)
BASOPHILS # BLD: 0.01 E9/L (ref 0–0.2)
BASOPHILS NFR BLD: 0.1 % (ref 0–2)
BILIRUB DIRECT SERPL-MCNC: <0.2 MG/DL (ref 0–0.3)
BILIRUB INDIRECT SERPL-MCNC: NORMAL MG/DL (ref 0–1)
BILIRUB SERPL-MCNC: 0.4 MG/DL (ref 0–1.2)
BNP BLD-MCNC: 511 PG/ML (ref 0–125)
BUN SERPL-MCNC: 15 MG/DL (ref 6–23)
CA-I BLD-SCNC: 1.34 MMOL/L (ref 1.15–1.33)
CALCIUM SERPL-MCNC: 9.8 MG/DL (ref 8.6–10.2)
CHLORIDE SERPL-SCNC: 102 MMOL/L (ref 98–107)
CHOLESTEROL, TOTAL: 146 MG/DL (ref 0–199)
CO2 SERPL-SCNC: 26 MMOL/L (ref 22–29)
CREAT SERPL-MCNC: 0.7 MG/DL (ref 0.7–1.2)
EOSINOPHIL # BLD: 0 E9/L (ref 0.05–0.5)
EOSINOPHIL NFR BLD: 0 % (ref 0–6)
ERYTHROCYTE [DISTWIDTH] IN BLOOD BY AUTOMATED COUNT: 14.7 FL (ref 11.5–15)
ERYTHROCYTE [SEDIMENTATION RATE] IN BLOOD BY WESTERGREN METHOD: 6 MM/HR (ref 0–15)
FERRITIN SERPL-MCNC: 25 NG/ML
GLUCOSE SERPL-MCNC: 143 MG/DL (ref 74–99)
HCT VFR BLD AUTO: 49.3 % (ref 37–54)
HDLC SERPL-MCNC: 78 MG/DL
HGB BLD-MCNC: 16.6 G/DL (ref 12.5–16.5)
IMM GRANULOCYTES # BLD: 0.02 E9/L
IMM GRANULOCYTES NFR BLD: 0.3 % (ref 0–5)
IRON SATN MFR SERPL: 9 % (ref 20–55)
IRON SERPL-MCNC: 28 MCG/DL (ref 59–158)
LACTATE BLDV-SCNC: 2.2 MMOL/L (ref 0.5–1.9)
LDLC SERPL CALC-MCNC: 56 MG/DL (ref 0–99)
LEGIONELLA AG UR QL: NORMAL
LV EF: 59 %
LVEF MODALITY: NORMAL
LYMPHOCYTES # BLD: 0.28 E9/L (ref 1.5–4)
LYMPHOCYTES NFR BLD: 3.8 % (ref 20–42)
MAGNESIUM SERPL-MCNC: 1.9 MG/DL (ref 1.6–2.6)
MCH RBC QN AUTO: 30.4 PG (ref 26–35)
MCHC RBC AUTO-ENTMCNC: 33.7 % (ref 32–34.5)
MCV RBC AUTO: 90.3 FL (ref 80–99.9)
MONOCYTES # BLD: 0.28 E9/L (ref 0.1–0.95)
MONOCYTES NFR BLD: 3.8 % (ref 2–12)
NEUTROPHILS # BLD: 6.73 E9/L (ref 1.8–7.3)
NEUTS SEG NFR BLD: 92 % (ref 43–80)
PHOSPHATE SERPL-MCNC: 3 MG/DL (ref 2.5–4.5)
PLATELET # BLD AUTO: 217 E9/L (ref 130–450)
PMV BLD AUTO: 10.7 FL (ref 7–12)
POTASSIUM SERPL-SCNC: 4.4 MMOL/L (ref 3.5–5)
PROT SERPL-MCNC: 6.4 G/DL (ref 6.4–8.3)
RBC # BLD AUTO: 5.46 E12/L (ref 3.8–5.8)
RBC MORPH BLD: NORMAL
S PNEUM AG SPEC QL: NORMAL
SODIUM SERPL-SCNC: 139 MMOL/L (ref 132–146)
TIBC SERPL-MCNC: 329 MCG/DL (ref 250–450)
TRIGL SERPL-MCNC: 62 MG/DL (ref 0–149)
URATE SERPL-MCNC: 6.5 MG/DL (ref 3.4–7)
VLDLC SERPL CALC-MCNC: 12 MG/DL
WBC # BLD: 7.3 E9/L (ref 4.5–11.5)

## 2023-06-12 PROCEDURE — 94640 AIRWAY INHALATION TREATMENT: CPT

## 2023-06-12 PROCEDURE — 80061 LIPID PANEL: CPT

## 2023-06-12 PROCEDURE — 87070 CULTURE OTHR SPECIMN AEROBIC: CPT

## 2023-06-12 PROCEDURE — 93306 TTE W/DOPPLER COMPLETE: CPT

## 2023-06-12 PROCEDURE — 87206 SMEAR FLUORESCENT/ACID STAI: CPT

## 2023-06-12 PROCEDURE — 6370000000 HC RX 637 (ALT 250 FOR IP): Performed by: INTERNAL MEDICINE

## 2023-06-12 PROCEDURE — 80048 BASIC METABOLIC PNL TOTAL CA: CPT

## 2023-06-12 PROCEDURE — 2700000000 HC OXYGEN THERAPY PER DAY

## 2023-06-12 PROCEDURE — 83605 ASSAY OF LACTIC ACID: CPT

## 2023-06-12 PROCEDURE — 83880 ASSAY OF NATRIURETIC PEPTIDE: CPT

## 2023-06-12 PROCEDURE — 85025 COMPLETE CBC W/AUTO DIFF WBC: CPT

## 2023-06-12 PROCEDURE — 36415 COLL VENOUS BLD VENIPUNCTURE: CPT

## 2023-06-12 PROCEDURE — 84100 ASSAY OF PHOSPHORUS: CPT

## 2023-06-12 PROCEDURE — 85651 RBC SED RATE NONAUTOMATED: CPT

## 2023-06-12 PROCEDURE — 6360000002 HC RX W HCPCS: Performed by: INTERNAL MEDICINE

## 2023-06-12 PROCEDURE — 83735 ASSAY OF MAGNESIUM: CPT

## 2023-06-12 PROCEDURE — 6370000000 HC RX 637 (ALT 250 FOR IP): Performed by: HOSPITALIST

## 2023-06-12 PROCEDURE — 83540 ASSAY OF IRON: CPT

## 2023-06-12 PROCEDURE — 2580000003 HC RX 258: Performed by: HOSPITALIST

## 2023-06-12 PROCEDURE — 82330 ASSAY OF CALCIUM: CPT

## 2023-06-12 PROCEDURE — 87186 SC STD MICRODIL/AGAR DIL: CPT

## 2023-06-12 PROCEDURE — 82728 ASSAY OF FERRITIN: CPT

## 2023-06-12 PROCEDURE — 2060000000 HC ICU INTERMEDIATE R&B

## 2023-06-12 PROCEDURE — 6360000002 HC RX W HCPCS: Performed by: HOSPITALIST

## 2023-06-12 PROCEDURE — 83550 IRON BINDING TEST: CPT

## 2023-06-12 PROCEDURE — 84550 ASSAY OF BLOOD/URIC ACID: CPT

## 2023-06-12 PROCEDURE — 82270 OCCULT BLOOD FECES: CPT

## 2023-06-12 PROCEDURE — 80076 HEPATIC FUNCTION PANEL: CPT

## 2023-06-12 RX ORDER — BUDESONIDE 0.5 MG/2ML
0.5 INHALANT ORAL 2 TIMES DAILY
Status: DISCONTINUED | OUTPATIENT
Start: 2023-06-12 | End: 2023-06-16 | Stop reason: HOSPADM

## 2023-06-12 RX ORDER — METHYLPREDNISOLONE SODIUM SUCCINATE 40 MG/ML
40 INJECTION, POWDER, LYOPHILIZED, FOR SOLUTION INTRAMUSCULAR; INTRAVENOUS EVERY 8 HOURS
Status: DISCONTINUED | OUTPATIENT
Start: 2023-06-12 | End: 2023-06-13

## 2023-06-12 RX ADMIN — IPRATROPIUM BROMIDE AND ALBUTEROL SULFATE 1 DOSE: 2.5; .5 SOLUTION RESPIRATORY (INHALATION) at 07:47

## 2023-06-12 RX ADMIN — GUAIFENESIN 400 MG: 400 TABLET ORAL at 17:22

## 2023-06-12 RX ADMIN — GUAIFENESIN 400 MG: 400 TABLET ORAL at 12:45

## 2023-06-12 RX ADMIN — SODIUM CHLORIDE, PRESERVATIVE FREE 10 ML: 5 INJECTION INTRAVENOUS at 08:22

## 2023-06-12 RX ADMIN — METHYLPREDNISOLONE SODIUM SUCCINATE 40 MG: 40 INJECTION, POWDER, FOR SOLUTION INTRAMUSCULAR; INTRAVENOUS at 02:47

## 2023-06-12 RX ADMIN — BUDESONIDE INHALATION 500 MCG: 0.5 SUSPENSION RESPIRATORY (INHALATION) at 20:09

## 2023-06-12 RX ADMIN — ENOXAPARIN SODIUM 40 MG: 100 INJECTION SUBCUTANEOUS at 08:22

## 2023-06-12 RX ADMIN — BUDESONIDE INHALATION 250 MCG: 0.5 SUSPENSION RESPIRATORY (INHALATION) at 07:47

## 2023-06-12 RX ADMIN — IPRATROPIUM BROMIDE AND ALBUTEROL SULFATE 1 DOSE: 2.5; .5 SOLUTION RESPIRATORY (INHALATION) at 20:09

## 2023-06-12 RX ADMIN — ARFORMOTEROL TARTRATE 15 MCG: 15 SOLUTION RESPIRATORY (INHALATION) at 07:47

## 2023-06-12 RX ADMIN — METHYLPREDNISOLONE SODIUM SUCCINATE 40 MG: 40 INJECTION, POWDER, FOR SOLUTION INTRAMUSCULAR; INTRAVENOUS at 17:22

## 2023-06-12 RX ADMIN — IPRATROPIUM BROMIDE AND ALBUTEROL SULFATE 1 DOSE: 2.5; .5 SOLUTION RESPIRATORY (INHALATION) at 16:08

## 2023-06-12 RX ADMIN — METHYLPREDNISOLONE SODIUM SUCCINATE 40 MG: 40 INJECTION, POWDER, FOR SOLUTION INTRAMUSCULAR; INTRAVENOUS at 09:43

## 2023-06-12 RX ADMIN — SODIUM CHLORIDE, PRESERVATIVE FREE 10 ML: 5 INJECTION INTRAVENOUS at 20:57

## 2023-06-12 RX ADMIN — GUAIFENESIN 400 MG: 400 TABLET ORAL at 08:21

## 2023-06-12 RX ADMIN — GUAIFENESIN 400 MG: 400 TABLET ORAL at 20:48

## 2023-06-12 RX ADMIN — IPRATROPIUM BROMIDE AND ALBUTEROL SULFATE 1 DOSE: 2.5; .5 SOLUTION RESPIRATORY (INHALATION) at 12:33

## 2023-06-12 RX ADMIN — PANTOPRAZOLE SODIUM 40 MG: 40 TABLET, DELAYED RELEASE ORAL at 06:02

## 2023-06-12 RX ADMIN — AZITHROMYCIN 500 MG: 250 TABLET, FILM COATED ORAL at 09:41

## 2023-06-12 RX ADMIN — ARFORMOTEROL TARTRATE 15 MCG: 15 SOLUTION RESPIRATORY (INHALATION) at 20:09

## 2023-06-12 ASSESSMENT — PAIN SCALES - GENERAL: PAINLEVEL_OUTOF10: 0

## 2023-06-12 NOTE — PLAN OF CARE
Problem: Discharge Planning  Goal: Discharge to home or other facility with appropriate resources  6/12/2023 0036 by Daryle Goo  Outcome: Progressing  Flowsheets (Taken 6/10/2023 2031)  Discharge to home or other facility with appropriate resources:   Identify barriers to discharge with patient and caregiver   Arrange for interpreters to assist at discharge as needed   Identify discharge learning needs (meds, wound care, etc)   Arrange for needed discharge resources and transportation as appropriate     Problem: Respiratory - Adult  Goal: Achieves optimal ventilation and oxygenation  6/12/2023 0036 by Daryle Goo  Outcome: Progressing  Flowsheets (Taken 6/10/2023 2031)  Achieves optimal ventilation and oxygenation:   Assess for changes in respiratory status   Assess for changes in mentation and behavior   Position to facilitate oxygenation and minimize respiratory effort   Oxygen supplementation based on oxygen saturation or arterial blood gases   Assess the need for suctioning and aspirate as needed   Encourage broncho-pulmonary hygiene including cough, deep breathe, incentive spirometry   Assess and instruct to report shortness of breath or any respiratory difficulty   Respiratory therapy support as indicated     Problem: Skin/Tissue Integrity  Goal: Absence of new skin breakdown  Description: 1. Monitor for areas of redness and/or skin breakdown  2. Assess vascular access sites hourly  3. Every 4-6 hours minimum:  Change oxygen saturation probe site  4. Every 4-6 hours:  If on nasal continuous positive airway pressure, respiratory therapy assess nares and determine need for appliance change or resting period.   6/12/2023 0925 by Priscilla Villa RN  Outcome: Progressing  6/12/2023 0036 by Daryle Goo  Outcome: Progressing     Problem: Safety - Adult  Goal: Free from fall injury  6/12/2023 0925 by Priscilla Villa RN  Outcome: Progressing  6/12/2023 0036 by Daryle Goo  Outcome: Progressing  Flowsheets (Taken

## 2023-06-12 NOTE — CARE COORDINATION
6/12/2023  Social Work Discharge Planning: Echo today. This worker met with Pt and spouse to discuss  role and transition of care/discharge planning. Awaiting therapy evals. Pt states he is independent from home with no DME. [address] home. Pt is on 9l o2 here and uses 4-5l at home via [facility] DME. Wean o2 as tolerated. Pharmacy is [facility] and PCP is Dr. Agapito Oconnor.  Electronically signed by MALVIN Palencia on 6/12/2023 at 2:32 PM

## 2023-06-12 NOTE — ACP (ADVANCE CARE PLANNING)
6/12/2023 Advance Care Planning   Healthcare Decision Maker:    Primary Decision Maker: Jason Moncada - [facility] - [phone]    Click here to complete Healthcare Decision Makers including selection of the Healthcare Decision Maker Relationship (ie \"Primary\").

## 2023-06-12 NOTE — PLAN OF CARE
Problem: Discharge Planning  Goal: Discharge to home or other facility with appropriate resources  Outcome: Progressing  Flowsheets (Taken 6/10/2023 2031)  Discharge to home or other facility with appropriate resources:   Identify barriers to discharge with patient and caregiver   Arrange for interpreters to assist at discharge as needed   Identify discharge learning needs (meds, wound care, etc)   Arrange for needed discharge resources and transportation as appropriate     Problem: Respiratory - Adult  Goal: Achieves optimal ventilation and oxygenation  Outcome: Progressing  Flowsheets (Taken 6/10/2023 2031)  Achieves optimal ventilation and oxygenation:   Assess for changes in respiratory status   Assess for changes in mentation and behavior   Position to facilitate oxygenation and minimize respiratory effort   Oxygen supplementation based on oxygen saturation or arterial blood gases   Assess the need for suctioning and aspirate as needed   Encourage broncho-pulmonary hygiene including cough, deep breathe, incentive spirometry   Assess and instruct to report shortness of breath or any respiratory difficulty   Respiratory therapy support as indicated     Problem: Skin/Tissue Integrity  Goal: Absence of new skin breakdown  Description: 1. Monitor for areas of redness and/or skin breakdown  2. Assess vascular access sites hourly  3. Every 4-6 hours minimum:  Change oxygen saturation probe site  4. Every 4-6 hours:  If on nasal continuous positive airway pressure, respiratory therapy assess nares and determine need for appliance change or resting period.   Outcome: Progressing     Problem: Safety - Adult  Goal: Free from fall injury  Outcome: Progressing  Flowsheets (Taken 6/12/2023 0036)  Free From Fall Injury: Instruct family/caregiver on patient safety     Problem: Pain  Goal: Verbalizes/displays adequate comfort level or baseline comfort level  Outcome: Progressing  Flowsheets (Taken 6/10/2023

## 2023-06-13 ENCOUNTER — APPOINTMENT (OUTPATIENT)
Dept: GENERAL RADIOLOGY | Age: 73
DRG: 189 | End: 2023-06-13
Payer: MEDICARE

## 2023-06-13 LAB
ALBUMIN SERPL-MCNC: 3.9 G/DL (ref 3.5–5.2)
ALP SERPL-CCNC: 65 U/L (ref 40–129)
ALT SERPL-CCNC: 13 U/L (ref 0–40)
ANION GAP SERPL CALCULATED.3IONS-SCNC: 9 MMOL/L (ref 7–16)
AST SERPL-CCNC: 30 U/L (ref 0–39)
BASOPHILS # BLD: 0 E9/L (ref 0–0.2)
BASOPHILS NFR BLD: 0 % (ref 0–2)
BILIRUB DIRECT SERPL-MCNC: <0.2 MG/DL (ref 0–0.3)
BILIRUB INDIRECT SERPL-MCNC: NORMAL MG/DL (ref 0–1)
BILIRUB SERPL-MCNC: 0.3 MG/DL (ref 0–1.2)
BUN SERPL-MCNC: 15 MG/DL (ref 6–23)
BURR CELLS: ABNORMAL
CALCIUM SERPL-MCNC: 9.8 MG/DL (ref 8.6–10.2)
CHLORIDE SERPL-SCNC: 100 MMOL/L (ref 98–107)
CO2 SERPL-SCNC: 23 MMOL/L (ref 22–29)
CREAT SERPL-MCNC: 0.7 MG/DL (ref 0.7–1.2)
EOSINOPHIL # BLD: 0 E9/L (ref 0.05–0.5)
EOSINOPHIL NFR BLD: 0 % (ref 0–6)
ERYTHROCYTE [DISTWIDTH] IN BLOOD BY AUTOMATED COUNT: 14.6 FL (ref 11.5–15)
ERYTHROCYTE [SEDIMENTATION RATE] IN BLOOD BY WESTERGREN METHOD: 5 MM/HR (ref 0–15)
GLUCOSE SERPL-MCNC: 138 MG/DL (ref 74–99)
HCT VFR BLD AUTO: 51.3 % (ref 37–54)
HEMOCCULT SP1 STL QL: NORMAL
HGB BLD-MCNC: 17 G/DL (ref 12.5–16.5)
HYPOCHROMIA: ABNORMAL
IMM GRANULOCYTES # BLD: 0.03 E9/L
IMM GRANULOCYTES NFR BLD: 0.4 % (ref 0–5)
LACTATE BLDV-SCNC: 1.9 MMOL/L (ref 0.5–1.9)
LYMPHOCYTES # BLD: 0.3 E9/L (ref 1.5–4)
LYMPHOCYTES NFR BLD: 3.5 % (ref 20–42)
MAGNESIUM SERPL-MCNC: 2.2 MG/DL (ref 1.6–2.6)
MCH RBC QN AUTO: 30.2 PG (ref 26–35)
MCHC RBC AUTO-ENTMCNC: 33.1 % (ref 32–34.5)
MCV RBC AUTO: 91.3 FL (ref 80–99.9)
MONOCYTES # BLD: 0.19 E9/L (ref 0.1–0.95)
MONOCYTES NFR BLD: 2.2 % (ref 2–12)
NEUTROPHILS # BLD: 7.99 E9/L (ref 1.8–7.3)
NEUTS SEG NFR BLD: 93.9 % (ref 43–80)
PHOSPHATE SERPL-MCNC: 3.4 MG/DL (ref 2.5–4.5)
PLATELET # BLD AUTO: 198 E9/L (ref 130–450)
PMV BLD AUTO: 10.9 FL (ref 7–12)
POIKILOCYTES: ABNORMAL
POTASSIUM SERPL-SCNC: 5.6 MMOL/L (ref 3.5–5)
PROT SERPL-MCNC: 6.5 G/DL (ref 6.4–8.3)
RBC # BLD AUTO: 5.62 E12/L (ref 3.8–5.8)
REASON FOR REJECTION: NORMAL
REJECTED TEST: NORMAL
SODIUM SERPL-SCNC: 132 MMOL/L (ref 132–146)
WBC # BLD: 8.5 E9/L (ref 4.5–11.5)

## 2023-06-13 PROCEDURE — 6360000002 HC RX W HCPCS: Performed by: INTERNAL MEDICINE

## 2023-06-13 PROCEDURE — 85651 RBC SED RATE NONAUTOMATED: CPT

## 2023-06-13 PROCEDURE — 84100 ASSAY OF PHOSPHORUS: CPT

## 2023-06-13 PROCEDURE — 6370000000 HC RX 637 (ALT 250 FOR IP): Performed by: INTERNAL MEDICINE

## 2023-06-13 PROCEDURE — 2060000000 HC ICU INTERMEDIATE R&B

## 2023-06-13 PROCEDURE — 80076 HEPATIC FUNCTION PANEL: CPT

## 2023-06-13 PROCEDURE — 94640 AIRWAY INHALATION TREATMENT: CPT

## 2023-06-13 PROCEDURE — 6370000000 HC RX 637 (ALT 250 FOR IP): Performed by: HOSPITALIST

## 2023-06-13 PROCEDURE — 83605 ASSAY OF LACTIC ACID: CPT

## 2023-06-13 PROCEDURE — 6360000002 HC RX W HCPCS: Performed by: HOSPITALIST

## 2023-06-13 PROCEDURE — 83735 ASSAY OF MAGNESIUM: CPT

## 2023-06-13 PROCEDURE — 97161 PT EVAL LOW COMPLEX 20 MIN: CPT

## 2023-06-13 PROCEDURE — 80048 BASIC METABOLIC PNL TOTAL CA: CPT

## 2023-06-13 PROCEDURE — 85025 COMPLETE CBC W/AUTO DIFF WBC: CPT

## 2023-06-13 PROCEDURE — 97165 OT EVAL LOW COMPLEX 30 MIN: CPT

## 2023-06-13 PROCEDURE — 2580000003 HC RX 258: Performed by: HOSPITALIST

## 2023-06-13 PROCEDURE — 36415 COLL VENOUS BLD VENIPUNCTURE: CPT

## 2023-06-13 PROCEDURE — 2700000000 HC OXYGEN THERAPY PER DAY

## 2023-06-13 PROCEDURE — 71045 X-RAY EXAM CHEST 1 VIEW: CPT

## 2023-06-13 RX ORDER — METHYLPREDNISOLONE SODIUM SUCCINATE 40 MG/ML
30 INJECTION, POWDER, LYOPHILIZED, FOR SOLUTION INTRAMUSCULAR; INTRAVENOUS EVERY 8 HOURS SCHEDULED
Status: DISCONTINUED | OUTPATIENT
Start: 2023-06-13 | End: 2023-06-14

## 2023-06-13 RX ADMIN — SODIUM CHLORIDE, PRESERVATIVE FREE 10 ML: 5 INJECTION INTRAVENOUS at 21:03

## 2023-06-13 RX ADMIN — METHYLPREDNISOLONE SODIUM SUCCINATE 40 MG: 40 INJECTION, POWDER, FOR SOLUTION INTRAMUSCULAR; INTRAVENOUS at 00:09

## 2023-06-13 RX ADMIN — GUAIFENESIN 400 MG: 400 TABLET ORAL at 21:00

## 2023-06-13 RX ADMIN — ARFORMOTEROL TARTRATE 15 MCG: 15 SOLUTION RESPIRATORY (INHALATION) at 08:05

## 2023-06-13 RX ADMIN — OXYCODONE HYDROCHLORIDE AND ACETAMINOPHEN 1 TABLET: 5; 325 TABLET ORAL at 21:00

## 2023-06-13 RX ADMIN — ENOXAPARIN SODIUM 40 MG: 100 INJECTION SUBCUTANEOUS at 08:30

## 2023-06-13 RX ADMIN — GUAIFENESIN 400 MG: 400 TABLET ORAL at 13:33

## 2023-06-13 RX ADMIN — SODIUM CHLORIDE, PRESERVATIVE FREE 10 ML: 5 INJECTION INTRAVENOUS at 08:30

## 2023-06-13 RX ADMIN — METHYLPREDNISOLONE SODIUM SUCCINATE 30 MG: 40 INJECTION, POWDER, FOR SOLUTION INTRAMUSCULAR; INTRAVENOUS at 21:01

## 2023-06-13 RX ADMIN — OXYCODONE HYDROCHLORIDE 2.5 MG: 5 TABLET ORAL at 21:00

## 2023-06-13 RX ADMIN — BUDESONIDE INHALATION 500 MCG: 0.5 SUSPENSION RESPIRATORY (INHALATION) at 08:06

## 2023-06-13 RX ADMIN — PANTOPRAZOLE SODIUM 40 MG: 40 TABLET, DELAYED RELEASE ORAL at 05:01

## 2023-06-13 RX ADMIN — IPRATROPIUM BROMIDE AND ALBUTEROL SULFATE 1 DOSE: 2.5; .5 SOLUTION RESPIRATORY (INHALATION) at 15:32

## 2023-06-13 RX ADMIN — IPRATROPIUM BROMIDE AND ALBUTEROL SULFATE 1 DOSE: 2.5; .5 SOLUTION RESPIRATORY (INHALATION) at 08:05

## 2023-06-13 RX ADMIN — IPRATROPIUM BROMIDE AND ALBUTEROL SULFATE 1 DOSE: 2.5; .5 SOLUTION RESPIRATORY (INHALATION) at 19:24

## 2023-06-13 RX ADMIN — ARFORMOTEROL TARTRATE 15 MCG: 15 SOLUTION RESPIRATORY (INHALATION) at 19:24

## 2023-06-13 RX ADMIN — METHYLPREDNISOLONE SODIUM SUCCINATE 30 MG: 40 INJECTION, POWDER, FOR SOLUTION INTRAMUSCULAR; INTRAVENOUS at 14:57

## 2023-06-13 RX ADMIN — GUAIFENESIN 400 MG: 400 TABLET ORAL at 08:36

## 2023-06-13 RX ADMIN — IPRATROPIUM BROMIDE AND ALBUTEROL SULFATE 1 DOSE: 2.5; .5 SOLUTION RESPIRATORY (INHALATION) at 12:30

## 2023-06-13 RX ADMIN — METHYLPREDNISOLONE SODIUM SUCCINATE 40 MG: 40 INJECTION, POWDER, FOR SOLUTION INTRAMUSCULAR; INTRAVENOUS at 08:30

## 2023-06-13 RX ADMIN — BUDESONIDE INHALATION 500 MCG: 0.5 SUSPENSION RESPIRATORY (INHALATION) at 19:24

## 2023-06-13 RX ADMIN — GUAIFENESIN 400 MG: 400 TABLET ORAL at 16:46

## 2023-06-13 ASSESSMENT — PAIN SCALES - GENERAL
PAINLEVEL_OUTOF10: 0
PAINLEVEL_OUTOF10: 0
PAINLEVEL_OUTOF10: 4

## 2023-06-13 ASSESSMENT — PAIN DESCRIPTION - LOCATION: LOCATION: HIP

## 2023-06-13 ASSESSMENT — PAIN DESCRIPTION - ORIENTATION: ORIENTATION: LEFT

## 2023-06-13 ASSESSMENT — PAIN DESCRIPTION - DESCRIPTORS: DESCRIPTORS: THROBBING

## 2023-06-13 NOTE — PLAN OF CARE
Problem: Discharge Planning  Goal: Discharge to home or other facility with appropriate resources  6/13/2023 1049 by Keke Fry RN  Outcome: Progressing  6/13/2023 0215 by Fidencio Peck RN  Outcome: Progressing  6/13/2023 0214 by Fidencio Peck RN  Outcome: Progressing     Problem: Respiratory - Adult  Goal: Achieves optimal ventilation and oxygenation  Outcome: Progressing     Problem: Skin/Tissue Integrity  Goal: Absence of new skin breakdown  Description: 1. Monitor for areas of redness and/or skin breakdown  2. Assess vascular access sites hourly  3. Every 4-6 hours minimum:  Change oxygen saturation probe site  4. Every 4-6 hours:  If on nasal continuous positive airway pressure, respiratory therapy assess nares and determine need for appliance change or resting period.   Outcome: Progressing

## 2023-06-13 NOTE — CARE COORDINATION
6/13/2023  Social Work Discharge Planning:Awaiting therapy evals. Pt is now on 6l o2. Continue to wean o2. Pt uses 4-5l o2 via [facility] DME at home. Pt is independent from home with his spouse.  Electronically signed by MALVIN Schaefer on 6/13/2023 at 10:25 AM

## 2023-06-13 NOTE — PLAN OF CARE
Problem: Discharge Planning  Goal: Discharge to home or other facility with appropriate resources  6/13/2023 0215 by Jose Maria Garcia RN  Outcome: Progressing  6/13/2023 0214 by Jose Maria Garcia RN  Outcome: Progressing

## 2023-06-14 LAB
ALBUMIN SERPL-MCNC: 3.8 G/DL (ref 3.5–5.2)
ALP SERPL-CCNC: 81 U/L (ref 40–129)
ALT SERPL-CCNC: 12 U/L (ref 0–40)
ANION GAP SERPL CALCULATED.3IONS-SCNC: 9 MMOL/L (ref 7–16)
ANISOCYTOSIS: ABNORMAL
AST SERPL-CCNC: 13 U/L (ref 0–39)
BASOPHILS # BLD: 0 E9/L (ref 0–0.2)
BASOPHILS NFR BLD: 0 % (ref 0–2)
BILIRUB DIRECT SERPL-MCNC: <0.2 MG/DL (ref 0–0.3)
BILIRUB INDIRECT SERPL-MCNC: ABNORMAL MG/DL (ref 0–1)
BILIRUB SERPL-MCNC: 0.3 MG/DL (ref 0–1.2)
BUN SERPL-MCNC: 21 MG/DL (ref 6–23)
CALCIUM SERPL-MCNC: 9.6 MG/DL (ref 8.6–10.2)
CHLORIDE SERPL-SCNC: 100 MMOL/L (ref 98–107)
CO2 SERPL-SCNC: 26 MMOL/L (ref 22–29)
CREAT SERPL-MCNC: 0.6 MG/DL (ref 0.7–1.2)
EOSINOPHIL # BLD: 0 E9/L (ref 0.05–0.5)
EOSINOPHIL NFR BLD: 0 % (ref 0–6)
ERYTHROCYTE [DISTWIDTH] IN BLOOD BY AUTOMATED COUNT: 14.6 FL (ref 11.5–15)
GLUCOSE SERPL-MCNC: 129 MG/DL (ref 74–99)
HCT VFR BLD AUTO: 51.1 % (ref 37–54)
HGB BLD-MCNC: 16.8 G/DL (ref 12.5–16.5)
LYMPHOCYTES # BLD: 0.34 E9/L (ref 1.5–4)
LYMPHOCYTES NFR BLD: 4.3 % (ref 20–42)
MAGNESIUM SERPL-MCNC: 2.2 MG/DL (ref 1.6–2.6)
MCH RBC QN AUTO: 30.1 PG (ref 26–35)
MCHC RBC AUTO-ENTMCNC: 32.9 % (ref 32–34.5)
MCV RBC AUTO: 91.6 FL (ref 80–99.9)
MONOCYTES # BLD: 0.34 E9/L (ref 0.1–0.95)
MONOCYTES NFR BLD: 4.3 % (ref 2–12)
NEUTROPHILS # BLD: 7.64 E9/L (ref 1.8–7.3)
NEUTS SEG NFR BLD: 91.4 % (ref 43–80)
NRBC BLD-RTO: 0 /100 WBC
PHOSPHATE SERPL-MCNC: 3.5 MG/DL (ref 2.5–4.5)
PLATELET # BLD AUTO: 237 E9/L (ref 130–450)
PMV BLD AUTO: 10.9 FL (ref 7–12)
POIKILOCYTES: ABNORMAL
POTASSIUM SERPL-SCNC: 4.6 MMOL/L (ref 3.5–5)
PROT SERPL-MCNC: 5.8 G/DL (ref 6.4–8.3)
RBC # BLD AUTO: 5.58 E12/L (ref 3.8–5.8)
SODIUM SERPL-SCNC: 135 MMOL/L (ref 132–146)
SPHEROCYTES: ABNORMAL
TEAR DROP CELLS: ABNORMAL
WBC # BLD: 8.4 E9/L (ref 4.5–11.5)

## 2023-06-14 PROCEDURE — 94640 AIRWAY INHALATION TREATMENT: CPT

## 2023-06-14 PROCEDURE — 2060000000 HC ICU INTERMEDIATE R&B

## 2023-06-14 PROCEDURE — 2700000000 HC OXYGEN THERAPY PER DAY

## 2023-06-14 PROCEDURE — 85025 COMPLETE CBC W/AUTO DIFF WBC: CPT

## 2023-06-14 PROCEDURE — 6370000000 HC RX 637 (ALT 250 FOR IP): Performed by: INTERNAL MEDICINE

## 2023-06-14 PROCEDURE — 80048 BASIC METABOLIC PNL TOTAL CA: CPT

## 2023-06-14 PROCEDURE — 6360000002 HC RX W HCPCS: Performed by: HOSPITALIST

## 2023-06-14 PROCEDURE — 6360000002 HC RX W HCPCS: Performed by: INTERNAL MEDICINE

## 2023-06-14 PROCEDURE — 83735 ASSAY OF MAGNESIUM: CPT

## 2023-06-14 PROCEDURE — 2580000003 HC RX 258: Performed by: HOSPITALIST

## 2023-06-14 PROCEDURE — 80076 HEPATIC FUNCTION PANEL: CPT

## 2023-06-14 PROCEDURE — 36415 COLL VENOUS BLD VENIPUNCTURE: CPT

## 2023-06-14 PROCEDURE — 6370000000 HC RX 637 (ALT 250 FOR IP): Performed by: HOSPITALIST

## 2023-06-14 PROCEDURE — 84100 ASSAY OF PHOSPHORUS: CPT

## 2023-06-14 RX ORDER — METHYLPREDNISOLONE SODIUM SUCCINATE 40 MG/ML
30 INJECTION, POWDER, LYOPHILIZED, FOR SOLUTION INTRAMUSCULAR; INTRAVENOUS EVERY 12 HOURS
Status: COMPLETED | OUTPATIENT
Start: 2023-06-14 | End: 2023-06-15

## 2023-06-14 RX ADMIN — SODIUM CHLORIDE, PRESERVATIVE FREE 10 ML: 5 INJECTION INTRAVENOUS at 08:26

## 2023-06-14 RX ADMIN — AZITHROMYCIN 500 MG: 250 TABLET, FILM COATED ORAL at 08:26

## 2023-06-14 RX ADMIN — ARFORMOTEROL TARTRATE 15 MCG: 15 SOLUTION RESPIRATORY (INHALATION) at 19:57

## 2023-06-14 RX ADMIN — GUAIFENESIN 400 MG: 400 TABLET ORAL at 12:51

## 2023-06-14 RX ADMIN — IPRATROPIUM BROMIDE AND ALBUTEROL SULFATE 1 DOSE: 2.5; .5 SOLUTION RESPIRATORY (INHALATION) at 08:39

## 2023-06-14 RX ADMIN — GUAIFENESIN 400 MG: 400 TABLET ORAL at 17:14

## 2023-06-14 RX ADMIN — BUDESONIDE INHALATION 500 MCG: 0.5 SUSPENSION RESPIRATORY (INHALATION) at 08:39

## 2023-06-14 RX ADMIN — IPRATROPIUM BROMIDE AND ALBUTEROL SULFATE 1 DOSE: 2.5; .5 SOLUTION RESPIRATORY (INHALATION) at 13:03

## 2023-06-14 RX ADMIN — SODIUM CHLORIDE, PRESERVATIVE FREE 10 ML: 5 INJECTION INTRAVENOUS at 21:16

## 2023-06-14 RX ADMIN — METHYLPREDNISOLONE SODIUM SUCCINATE 30 MG: 40 INJECTION, POWDER, FOR SOLUTION INTRAMUSCULAR; INTRAVENOUS at 17:14

## 2023-06-14 RX ADMIN — GUAIFENESIN 400 MG: 400 TABLET ORAL at 08:26

## 2023-06-14 RX ADMIN — ENOXAPARIN SODIUM 40 MG: 100 INJECTION SUBCUTANEOUS at 08:26

## 2023-06-14 RX ADMIN — ARFORMOTEROL TARTRATE 15 MCG: 15 SOLUTION RESPIRATORY (INHALATION) at 08:39

## 2023-06-14 RX ADMIN — BUDESONIDE INHALATION 500 MCG: 0.5 SUSPENSION RESPIRATORY (INHALATION) at 19:58

## 2023-06-14 RX ADMIN — METHYLPREDNISOLONE SODIUM SUCCINATE 30 MG: 40 INJECTION, POWDER, FOR SOLUTION INTRAMUSCULAR; INTRAVENOUS at 05:54

## 2023-06-14 RX ADMIN — IPRATROPIUM BROMIDE AND ALBUTEROL SULFATE 1 DOSE: 2.5; .5 SOLUTION RESPIRATORY (INHALATION) at 19:57

## 2023-06-14 RX ADMIN — GUAIFENESIN 400 MG: 400 TABLET ORAL at 21:16

## 2023-06-14 RX ADMIN — PANTOPRAZOLE SODIUM 40 MG: 40 TABLET, DELAYED RELEASE ORAL at 05:52

## 2023-06-14 RX ADMIN — IPRATROPIUM BROMIDE AND ALBUTEROL SULFATE 1 DOSE: 2.5; .5 SOLUTION RESPIRATORY (INHALATION) at 15:33

## 2023-06-14 ASSESSMENT — PAIN SCALES - GENERAL
PAINLEVEL_OUTOF10: 0
PAINLEVEL_OUTOF10: 0

## 2023-06-14 NOTE — PLAN OF CARE
Problem: Discharge Planning  Goal: Discharge to home or other facility with appropriate resources  Outcome: Progressing     Problem: Respiratory - Adult  Goal: Achieves optimal ventilation and oxygenation  Outcome: Progressing     Problem: Skin/Tissue Integrity  Goal: Absence of new skin breakdown  Description: 1. Monitor for areas of redness and/or skin breakdown  2. Assess vascular access sites hourly  3. Every 4-6 hours minimum:  Change oxygen saturation probe site  4. Every 4-6 hours:  If on nasal continuous positive airway pressure, respiratory therapy assess nares and determine need for appliance change or resting period.   Outcome: Progressing

## 2023-06-14 NOTE — CARE COORDINATION
6/14/2023  Social Work Discharge [address] 24/24. Pt is now on 7l o2. Wean o2. Pulse ox testing will be needed. Pt uses 4-5l o2 via [facility] -[id number]-at home. Pt is independent from home with his spouse.  Electronically signed by MALVIN Vega on 6/14/2023 at 9:35 AM

## 2023-06-15 LAB
ALBUMIN SERPL-MCNC: 3.9 G/DL (ref 3.5–5.2)
ALP SERPL-CCNC: 76 U/L (ref 40–129)
ALT SERPL-CCNC: 12 U/L (ref 0–40)
ANION GAP SERPL CALCULATED.3IONS-SCNC: 9 MMOL/L (ref 7–16)
AST SERPL-CCNC: 12 U/L (ref 0–39)
BACTERIA SPEC RESP CULT: ABNORMAL
BACTERIA SPEC RESP CULT: ABNORMAL
BASOPHILS # BLD: 0.01 E9/L (ref 0–0.2)
BASOPHILS NFR BLD: 0.1 % (ref 0–2)
BILIRUB DIRECT SERPL-MCNC: <0.2 MG/DL (ref 0–0.3)
BILIRUB INDIRECT SERPL-MCNC: ABNORMAL MG/DL (ref 0–1)
BILIRUB SERPL-MCNC: 0.4 MG/DL (ref 0–1.2)
BUN SERPL-MCNC: 21 MG/DL (ref 6–23)
CALCIUM SERPL-MCNC: 9.6 MG/DL (ref 8.6–10.2)
CHLORIDE SERPL-SCNC: 99 MMOL/L (ref 98–107)
CO2 SERPL-SCNC: 27 MMOL/L (ref 22–29)
CREAT SERPL-MCNC: 0.7 MG/DL (ref 0.7–1.2)
EOSINOPHIL # BLD: 0 E9/L (ref 0.05–0.5)
EOSINOPHIL NFR BLD: 0 % (ref 0–6)
ERYTHROCYTE [DISTWIDTH] IN BLOOD BY AUTOMATED COUNT: 14.6 FL (ref 11.5–15)
GLUCOSE SERPL-MCNC: 106 MG/DL (ref 74–99)
HCT VFR BLD AUTO: 54.3 % (ref 37–54)
HGB BLD-MCNC: 18 G/DL (ref 12.5–16.5)
IMM GRANULOCYTES # BLD: 0.07 E9/L
IMM GRANULOCYTES NFR BLD: 0.9 % (ref 0–5)
LYMPHOCYTES # BLD: 0.75 E9/L (ref 1.5–4)
LYMPHOCYTES NFR BLD: 10.2 % (ref 20–42)
MAGNESIUM SERPL-MCNC: 2.2 MG/DL (ref 1.6–2.6)
MCH RBC QN AUTO: 30.1 PG (ref 26–35)
MCHC RBC AUTO-ENTMCNC: 33.1 % (ref 32–34.5)
MCV RBC AUTO: 90.8 FL (ref 80–99.9)
MONOCYTES # BLD: 0.47 E9/L (ref 0.1–0.95)
MONOCYTES NFR BLD: 6.4 % (ref 2–12)
NEUTROPHILS # BLD: 6.07 E9/L (ref 1.8–7.3)
NEUTS SEG NFR BLD: 82.4 % (ref 43–80)
ORGANISM: ABNORMAL
PHOSPHATE SERPL-MCNC: 4 MG/DL (ref 2.5–4.5)
PLATELET # BLD AUTO: 236 E9/L (ref 130–450)
PMV BLD AUTO: 10.5 FL (ref 7–12)
POTASSIUM SERPL-SCNC: 4.3 MMOL/L (ref 3.5–5)
PROT SERPL-MCNC: 6 G/DL (ref 6.4–8.3)
RBC # BLD AUTO: 5.98 E12/L (ref 3.8–5.8)
SMEAR, RESPIRATORY: ABNORMAL
SODIUM SERPL-SCNC: 135 MMOL/L (ref 132–146)
WBC # BLD: 7.4 E9/L (ref 4.5–11.5)

## 2023-06-15 PROCEDURE — 2060000000 HC ICU INTERMEDIATE R&B

## 2023-06-15 PROCEDURE — 2700000000 HC OXYGEN THERAPY PER DAY

## 2023-06-15 PROCEDURE — 6360000002 HC RX W HCPCS: Performed by: HOSPITALIST

## 2023-06-15 PROCEDURE — 80076 HEPATIC FUNCTION PANEL: CPT

## 2023-06-15 PROCEDURE — 94640 AIRWAY INHALATION TREATMENT: CPT

## 2023-06-15 PROCEDURE — 83735 ASSAY OF MAGNESIUM: CPT

## 2023-06-15 PROCEDURE — 36415 COLL VENOUS BLD VENIPUNCTURE: CPT

## 2023-06-15 PROCEDURE — 6360000002 HC RX W HCPCS: Performed by: INTERNAL MEDICINE

## 2023-06-15 PROCEDURE — 85025 COMPLETE CBC W/AUTO DIFF WBC: CPT

## 2023-06-15 PROCEDURE — 6370000000 HC RX 637 (ALT 250 FOR IP): Performed by: SPECIALIST

## 2023-06-15 PROCEDURE — 2580000003 HC RX 258: Performed by: HOSPITALIST

## 2023-06-15 PROCEDURE — 6370000000 HC RX 637 (ALT 250 FOR IP): Performed by: INTERNAL MEDICINE

## 2023-06-15 PROCEDURE — 6360000002 HC RX W HCPCS: Performed by: SPECIALIST

## 2023-06-15 PROCEDURE — 6370000000 HC RX 637 (ALT 250 FOR IP): Performed by: HOSPITALIST

## 2023-06-15 PROCEDURE — 80048 BASIC METABOLIC PNL TOTAL CA: CPT

## 2023-06-15 PROCEDURE — 84100 ASSAY OF PHOSPHORUS: CPT

## 2023-06-15 RX ORDER — TOBRAMYCIN 40 MG/ML
300 INJECTION INTRAMUSCULAR; INTRAVENOUS EVERY 12 HOURS
Status: DISCONTINUED | OUTPATIENT
Start: 2023-06-15 | End: 2023-06-15 | Stop reason: ALTCHOICE

## 2023-06-15 RX ORDER — TOBRAMYCIN INHALATION SOLUTION 300 MG/5ML
300 INHALANT RESPIRATORY (INHALATION) 2 TIMES DAILY
Status: DISCONTINUED | OUTPATIENT
Start: 2023-06-15 | End: 2023-06-15 | Stop reason: RX

## 2023-06-15 RX ORDER — TOBRAMYCIN INHALATION SOLUTION 300 MG/5ML
300 INHALANT RESPIRATORY (INHALATION) 2 TIMES DAILY
Status: DISCONTINUED | OUTPATIENT
Start: 2023-06-15 | End: 2023-06-16 | Stop reason: HOSPADM

## 2023-06-15 RX ADMIN — GUAIFENESIN 400 MG: 400 TABLET ORAL at 08:32

## 2023-06-15 RX ADMIN — IPRATROPIUM BROMIDE AND ALBUTEROL SULFATE 1 DOSE: 2.5; .5 SOLUTION RESPIRATORY (INHALATION) at 08:37

## 2023-06-15 RX ADMIN — ARFORMOTEROL TARTRATE 15 MCG: 15 SOLUTION RESPIRATORY (INHALATION) at 08:37

## 2023-06-15 RX ADMIN — GUAIFENESIN 400 MG: 400 TABLET ORAL at 20:04

## 2023-06-15 RX ADMIN — IPRATROPIUM BROMIDE AND ALBUTEROL SULFATE 1 DOSE: 2.5; .5 SOLUTION RESPIRATORY (INHALATION) at 16:18

## 2023-06-15 RX ADMIN — IPRATROPIUM BROMIDE AND ALBUTEROL SULFATE 1 DOSE: 2.5; .5 SOLUTION RESPIRATORY (INHALATION) at 12:30

## 2023-06-15 RX ADMIN — BUDESONIDE INHALATION 500 MCG: 0.5 SUSPENSION RESPIRATORY (INHALATION) at 20:15

## 2023-06-15 RX ADMIN — PANTOPRAZOLE SODIUM 40 MG: 40 TABLET, DELAYED RELEASE ORAL at 05:20

## 2023-06-15 RX ADMIN — BUDESONIDE INHALATION 500 MCG: 0.5 SUSPENSION RESPIRATORY (INHALATION) at 08:37

## 2023-06-15 RX ADMIN — SODIUM CHLORIDE, PRESERVATIVE FREE 10 ML: 5 INJECTION INTRAVENOUS at 20:04

## 2023-06-15 RX ADMIN — TOBRAMYCIN 300 MG: 300 SOLUTION RESPIRATORY (INHALATION) at 20:25

## 2023-06-15 RX ADMIN — METHYLPREDNISOLONE SODIUM SUCCINATE 30 MG: 40 INJECTION, POWDER, FOR SOLUTION INTRAMUSCULAR; INTRAVENOUS at 06:05

## 2023-06-15 RX ADMIN — IPRATROPIUM BROMIDE AND ALBUTEROL SULFATE 1 DOSE: 2.5; .5 SOLUTION RESPIRATORY (INHALATION) at 20:15

## 2023-06-15 RX ADMIN — ENOXAPARIN SODIUM 40 MG: 100 INJECTION SUBCUTANEOUS at 08:32

## 2023-06-15 RX ADMIN — METHYLPREDNISOLONE SODIUM SUCCINATE 30 MG: 40 INJECTION, POWDER, FOR SOLUTION INTRAMUSCULAR; INTRAVENOUS at 17:22

## 2023-06-15 RX ADMIN — LEVOFLOXACIN 750 MG: 500 TABLET, FILM COATED ORAL at 12:23

## 2023-06-15 RX ADMIN — ARFORMOTEROL TARTRATE 15 MCG: 15 SOLUTION RESPIRATORY (INHALATION) at 20:15

## 2023-06-15 RX ADMIN — GUAIFENESIN 400 MG: 400 TABLET ORAL at 17:22

## 2023-06-15 RX ADMIN — SODIUM CHLORIDE, PRESERVATIVE FREE 10 ML: 5 INJECTION INTRAVENOUS at 09:11

## 2023-06-15 ASSESSMENT — PAIN SCALES - GENERAL
PAINLEVEL_OUTOF10: 0
PAINLEVEL_OUTOF10: 0

## 2023-06-15 NOTE — CONSULTS
CARDIOLOGY CONSULTATION    Patient Name:  John Oconnor    :  1950    Reason for Consultation:   Shortness of breath and elevated proBNP    History of Present Illness:   John Oconnor returns to [facility], as 1 of multiple admissions for exacerbation of his underlying lung disease. On this occasion his proBNP was once again elevated at >1800pg/mL he is now readmitted for reassessment of his shortness of breath which has recently increased in significance and to address his elevated proBNP. Of note is that a recent two-dimensional echocardiogram demonstrated preserved left ventricular systolic function without evidence of diastolic dysfunction and for unknown reasons did not demonstrate evidence for pulmonary hypertension although his right ventricle was significantly enlarged both on echo and nuclear stress testing. Presently he he admits to breathlessness but without any concomitant  chest discomfort nor palpitations or lightheadedness.    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
CO2 27 06/15/2023 04:07 AM    BUN 21 06/15/2023 04:07 AM    CREATININE 0.7 06/15/2023 04:07 AM    GFRAA >60 08/01/2022 03:55 PM    LABGLOM >60 06/15/2023 04:07 AM    GLUCOSE 106 06/15/2023 04:07 AM    PROT 6.0 06/15/2023 04:07 AM    LABALBU 3.9 06/15/2023 04:07 AM    CALCIUM 9.6 06/15/2023 04:07 AM    BILITOT 0.4 06/15/2023 04:07 AM    ALKPHOS 76 06/15/2023 04:07 AM    AST 12 06/15/2023 04:07 AM    ALT 12 06/15/2023 04:07 AM       Lab Results   Component Value Date/Time    PROTIME 12.5 05/30/2019 11:38 AM    INR 1.1 05/30/2019 11:38 AM       Lab Results   Component Value Date/Time    TSH 0.761 06/11/2023 11:26 AM       Lab Results   Component Value Date/Time    COLORU Yellow 11/18/2022 04:20 PM    PHUR 5.5 11/18/2022 04:20 PM    LABCAST FEW 10/02/2019 05:32 PM    WBCUA NONE 04/07/2021 10:35 AM    RBCUA NONE 04/07/2021 10:35 AM    MUCUS Present 04/07/2021 10:35 AM    BACTERIA FEW 04/07/2021 10:35 AM    CLARITYU Clear 11/18/2022 04:20 PM    SPECGRAV >=1.030 11/18/2022 04:20 PM    LEUKOCYTESUR Negative 11/18/2022 04:20 PM    UROBILINOGEN 0.2 11/18/2022 04:20 PM    BILIRUBINUR Negative 11/18/2022 04:20 PM    BLOODU Negative 11/18/2022 04:20 PM    GLUCOSEU Negative 11/18/2022 04:20 PM       Lab Results   Component Value Date/Time    HCO3 22.0 06/10/2023 01:15 PM    BE -1.9 06/10/2023 01:15 PM    O2SAT 98.0 06/10/2023 01:15 PM    PH 7.407 06/10/2023 01:15 PM    PCO2 35.8 06/10/2023 01:15 PM    PO2 102.8 06/10/2023 01:15 PM     Radiology:  Noted    Microbiology:  Pending  No results for input(s): BC in the last 72 hours. Recent Labs     06/12/23 2035   ORG Pseudomonas aeruginosa*     No results for input(s): BLOODCULT2 in the last 72 hours. No results for input(s): STREPNEUMAGU in the last 72 hours. No results for input(s): LP1UAG in the last 72 hours. No results for input(s): ASO in the last 72 hours.   Recent Labs     06/12/23 2035   CULTRESP Oral Pharyngeal Eliana reduced*  Heavy growth     No results for

## 2023-06-15 NOTE — CARE COORDINATION
6/15/2023  Social Work Discharge Planning:[facility] 24/24. Trying to wean o2. Pt is on 6l and uses 4-5 at home via [facility] dme-[phone]. Independent with no other needs. Electronically signed by MALVIN Peacock on 6/15/2023 at 9:06 AM

## 2023-06-16 VITALS
DIASTOLIC BLOOD PRESSURE: 73 MMHG | RESPIRATION RATE: 18 BRPM | SYSTOLIC BLOOD PRESSURE: 120 MMHG | OXYGEN SATURATION: 91 % | WEIGHT: 194.44 LBS | HEIGHT: 73 IN | TEMPERATURE: 97.7 F | HEART RATE: 73 BPM | BODY MASS INDEX: 25.77 KG/M2

## 2023-06-16 LAB
ALBUMIN SERPL-MCNC: 3.9 G/DL (ref 3.5–5.2)
ALP SERPL-CCNC: 68 U/L (ref 40–129)
ALT SERPL-CCNC: 13 U/L (ref 0–40)
ANION GAP SERPL CALCULATED.3IONS-SCNC: 10 MMOL/L (ref 7–16)
AST SERPL-CCNC: 11 U/L (ref 0–39)
BACTERIA BLD CULT ORG #2: NORMAL
BACTERIA BLD CULT: NORMAL
BASOPHILS # BLD: 0.01 E9/L (ref 0–0.2)
BASOPHILS NFR BLD: 0.1 % (ref 0–2)
BILIRUB DIRECT SERPL-MCNC: <0.2 MG/DL (ref 0–0.3)
BILIRUB INDIRECT SERPL-MCNC: ABNORMAL MG/DL (ref 0–1)
BILIRUB SERPL-MCNC: 0.5 MG/DL (ref 0–1.2)
BUN SERPL-MCNC: 23 MG/DL (ref 6–23)
CALCIUM SERPL-MCNC: 9.4 MG/DL (ref 8.6–10.2)
CHLORIDE SERPL-SCNC: 98 MMOL/L (ref 98–107)
CO2 SERPL-SCNC: 28 MMOL/L (ref 22–29)
CREAT SERPL-MCNC: 0.7 MG/DL (ref 0.7–1.2)
CRP SERPL HS-MCNC: <0.3 MG/DL (ref 0–0.4)
EKG ATRIAL RATE: 72 BPM
EKG P AXIS: 38 DEGREES
EKG P-R INTERVAL: 162 MS
EKG Q-T INTERVAL: 416 MS
EKG QRS DURATION: 102 MS
EKG QTC CALCULATION (BAZETT): 455 MS
EKG R AXIS: 44 DEGREES
EKG T AXIS: 13 DEGREES
EKG VENTRICULAR RATE: 72 BPM
EOSINOPHIL # BLD: 0 E9/L (ref 0.05–0.5)
EOSINOPHIL NFR BLD: 0 % (ref 0–6)
ERYTHROCYTE [DISTWIDTH] IN BLOOD BY AUTOMATED COUNT: 14.2 FL (ref 11.5–15)
ERYTHROCYTE [SEDIMENTATION RATE] IN BLOOD BY WESTERGREN METHOD: 1 MM/HR (ref 0–15)
GLUCOSE SERPL-MCNC: 107 MG/DL (ref 74–99)
HCT VFR BLD AUTO: 54 % (ref 37–54)
HGB BLD-MCNC: 17.7 G/DL (ref 12.5–16.5)
IMM GRANULOCYTES # BLD: 0.06 E9/L
IMM GRANULOCYTES NFR BLD: 0.9 % (ref 0–5)
LYMPHOCYTES # BLD: 0.68 E9/L (ref 1.5–4)
LYMPHOCYTES NFR BLD: 10 % (ref 20–42)
MAGNESIUM SERPL-MCNC: 2.2 MG/DL (ref 1.6–2.6)
MCH RBC QN AUTO: 29.9 PG (ref 26–35)
MCHC RBC AUTO-ENTMCNC: 32.8 % (ref 32–34.5)
MCV RBC AUTO: 91.2 FL (ref 80–99.9)
MONOCYTES # BLD: 0.41 E9/L (ref 0.1–0.95)
MONOCYTES NFR BLD: 6 % (ref 2–12)
NEUTROPHILS # BLD: 5.67 E9/L (ref 1.8–7.3)
NEUTS SEG NFR BLD: 83 % (ref 43–80)
PHOSPHATE SERPL-MCNC: 4 MG/DL (ref 2.5–4.5)
PLATELET # BLD AUTO: 221 E9/L (ref 130–450)
PMV BLD AUTO: 10.3 FL (ref 7–12)
POTASSIUM SERPL-SCNC: 4.7 MMOL/L (ref 3.5–5)
PROT SERPL-MCNC: 5.9 G/DL (ref 6.4–8.3)
RBC # BLD AUTO: 5.92 E12/L (ref 3.8–5.8)
SODIUM SERPL-SCNC: 136 MMOL/L (ref 132–146)
WBC # BLD: 6.8 E9/L (ref 4.5–11.5)

## 2023-06-16 PROCEDURE — 85025 COMPLETE CBC W/AUTO DIFF WBC: CPT

## 2023-06-16 PROCEDURE — 80048 BASIC METABOLIC PNL TOTAL CA: CPT

## 2023-06-16 PROCEDURE — 6370000000 HC RX 637 (ALT 250 FOR IP): Performed by: SPECIALIST

## 2023-06-16 PROCEDURE — 36415 COLL VENOUS BLD VENIPUNCTURE: CPT

## 2023-06-16 PROCEDURE — 94640 AIRWAY INHALATION TREATMENT: CPT

## 2023-06-16 PROCEDURE — 6370000000 HC RX 637 (ALT 250 FOR IP): Performed by: INTERNAL MEDICINE

## 2023-06-16 PROCEDURE — 6360000002 HC RX W HCPCS: Performed by: INTERNAL MEDICINE

## 2023-06-16 PROCEDURE — 84100 ASSAY OF PHOSPHORUS: CPT

## 2023-06-16 PROCEDURE — 6370000000 HC RX 637 (ALT 250 FOR IP): Performed by: HOSPITALIST

## 2023-06-16 PROCEDURE — 86140 C-REACTIVE PROTEIN: CPT

## 2023-06-16 PROCEDURE — 6360000002 HC RX W HCPCS: Performed by: HOSPITALIST

## 2023-06-16 PROCEDURE — 83735 ASSAY OF MAGNESIUM: CPT

## 2023-06-16 PROCEDURE — 80076 HEPATIC FUNCTION PANEL: CPT

## 2023-06-16 PROCEDURE — 2580000003 HC RX 258: Performed by: HOSPITALIST

## 2023-06-16 PROCEDURE — 85651 RBC SED RATE NONAUTOMATED: CPT

## 2023-06-16 PROCEDURE — 2700000000 HC OXYGEN THERAPY PER DAY

## 2023-06-16 PROCEDURE — 6360000002 HC RX W HCPCS: Performed by: SPECIALIST

## 2023-06-16 RX ORDER — LEVOFLOXACIN 750 MG/1
750 TABLET ORAL DAILY
Qty: 10 TABLET | Refills: 0 | Status: SHIPPED | OUTPATIENT
Start: 2023-06-17 | End: 2023-06-16 | Stop reason: SDUPTHER

## 2023-06-16 RX ORDER — GUAIFENESIN 400 MG/1
400 TABLET ORAL 4 TIMES DAILY
Qty: 56 TABLET | Refills: 0 | Status: SHIPPED | OUTPATIENT
Start: 2023-06-16 | End: 2023-06-16 | Stop reason: SDUPTHER

## 2023-06-16 RX ORDER — PREDNISONE 10 MG/1
30 TABLET ORAL DAILY
Qty: 30 TABLET | Refills: 0 | Status: SHIPPED | OUTPATIENT
Start: 2023-06-17 | End: 2023-06-16 | Stop reason: SDUPTHER

## 2023-06-16 RX ORDER — GUAIFENESIN 400 MG/1
400 TABLET ORAL 4 TIMES DAILY
Qty: 56 TABLET | Refills: 0 | Status: SHIPPED | OUTPATIENT
Start: 2023-06-16

## 2023-06-16 RX ORDER — PREDNISONE 10 MG/1
30 TABLET ORAL DAILY
Qty: 30 TABLET | Refills: 0 | Status: SHIPPED | OUTPATIENT
Start: 2023-06-17 | End: 2023-06-27

## 2023-06-16 RX ORDER — LEVOFLOXACIN 750 MG/1
750 TABLET ORAL DAILY
Qty: 10 TABLET | Refills: 0 | Status: SHIPPED | OUTPATIENT
Start: 2023-06-17 | End: 2023-06-27

## 2023-06-16 RX ADMIN — AZITHROMYCIN 500 MG: 250 TABLET, FILM COATED ORAL at 10:08

## 2023-06-16 RX ADMIN — ARFORMOTEROL TARTRATE 15 MCG: 15 SOLUTION RESPIRATORY (INHALATION) at 08:29

## 2023-06-16 RX ADMIN — IPRATROPIUM BROMIDE AND ALBUTEROL SULFATE 1 DOSE: 2.5; .5 SOLUTION RESPIRATORY (INHALATION) at 12:44

## 2023-06-16 RX ADMIN — TOBRAMYCIN 300 MG: 300 SOLUTION RESPIRATORY (INHALATION) at 08:46

## 2023-06-16 RX ADMIN — GUAIFENESIN 400 MG: 400 TABLET ORAL at 12:56

## 2023-06-16 RX ADMIN — SODIUM CHLORIDE, PRESERVATIVE FREE 10 ML: 5 INJECTION INTRAVENOUS at 11:34

## 2023-06-16 RX ADMIN — PREDNISONE 30 MG: 20 TABLET ORAL at 08:14

## 2023-06-16 RX ADMIN — IPRATROPIUM BROMIDE AND ALBUTEROL SULFATE 1 DOSE: 2.5; .5 SOLUTION RESPIRATORY (INHALATION) at 08:29

## 2023-06-16 RX ADMIN — ENOXAPARIN SODIUM 40 MG: 100 INJECTION SUBCUTANEOUS at 08:14

## 2023-06-16 RX ADMIN — LEVOFLOXACIN 750 MG: 500 TABLET, FILM COATED ORAL at 08:14

## 2023-06-16 RX ADMIN — GUAIFENESIN 400 MG: 400 TABLET ORAL at 08:13

## 2023-06-16 RX ADMIN — SODIUM CHLORIDE, PRESERVATIVE FREE 10 ML: 5 INJECTION INTRAVENOUS at 08:15

## 2023-06-16 RX ADMIN — PANTOPRAZOLE SODIUM 40 MG: 40 TABLET, DELAYED RELEASE ORAL at 06:10

## 2023-06-16 RX ADMIN — BUDESONIDE INHALATION 500 MCG: 0.5 SUSPENSION RESPIRATORY (INHALATION) at 08:29

## 2023-06-16 NOTE — CARE COORDINATION
6/16/2023  Social Work Discharge Planning:Plan is home with spouse. Pt is on 5l o2 here and per [facility] [phone], his script is for 3l. Will need pulse ox testing and a new script if unable to wean to 3l.   Electronically signed by MALVIN Garcia on 6/16/2023 at 9:50 AM

## 2023-06-16 NOTE — DISCHARGE SUMMARY
DISCHARGE SUMMARY  Patient ID:  Anjana Chávez  [id number]  81 y.o.  1950    Admit date: 6/10/2023      Discharge date : 6/16/2023      Admission Diagnoses: Acute pulmonary edema ([facility] [J81.0]  COPD exacerbation ([facility] [J44.1]  Acute on chronic respiratory failure (HCC) [J96.20]  Acute on chronic respiratory failure with hypoxia (HCC) [J96.21]    Discharge Diagnosis  Principal Problem:    Acute on chronic respiratory failure with hypoxia (HCC)  Active Problems:    COPD (chronic obstructive pulmonary disease) (HCC)    Glucose intolerance    Pulmonary Mycobacterium avium complex (MAC) infection (HCC)    Acute heart failure with preserved ejection fraction (HCC)    Chronic respiratory failure with hypoxia (HCC)    Immunocompromised (HCC)    Acute on chronic respiratory failure ([facility]  Resolved Problems:    * No resolved hospital problems. *      Hospital Course:    CHIEF COMPLAINT: Short of breath           History of Present Illness: 74-year-old male patient of Dr. Demi Ash I am asked to admit and follow. History obtained from patient, his wife as well as extensive review of electronic record. Patient has known history of COPD normally on 4-5 L of oxygen at home on baseline had increased to 6-7 L/day for the last 7 to 10 days without improvement. 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 Upon presentation to the ED SPO2 was 88 temperature was 97.8. He was placed on nonrebreather with improvement of his oxygen saturations. Arterial blood gases done on 10 L showed a pH of 7.407 PCO2 of 35.8 and a PO2 of 102.8. EKG reviewed and showed normal sinus rhythm. Potassium in the ED 4.9 glucose 90. BUN 9 creatinine 0.8.  proBNP elevated 1815. proBNP was 436 on 11/20/2022. He was given furosemide 40 mg IV one-time by the ED. In the ED liver functions were normal.  WBC was 7.2 hemoglobin 17.3. SARS-CoV-2 in the ED was not detected.   Chest x-ray

## 2023-06-16 NOTE — PROGRESS NOTES
[address] [place] [address] [facility]  Progress Note    SUBJECTIVE:  Chief Complaint   Patient presents with    Shortness of Breath     SOB x\"a week or some more\", normally wears 4-4.5L O2     The patient is tolerating antibiotics. Less dyspnea. No nausea or vomiting. No fever. Review of systems:  As stated above in the chief complaint, otherwise negative. Medications:  Scheduled Meds:   predniSONE  30 mg Oral Daily    levoFLOXacin  750 mg Oral Daily    tobramycin (PF)  300 mg Nebulization BID    budesonide  0.5 mg Nebulization BID    arformoterol tartrate  15 mcg Nebulization BID    guaiFENesin  400 mg Oral 4x Daily    pantoprazole  40 mg Oral QAM AC    azithromycin  500 mg Oral Q MWF    sodium chloride flush  5-40 mL IntraVENous 2 times per day    enoxaparin  40 mg SubCUTAneous Daily    ipratropium 0.5 mg-albuterol 2.5 mg  1 Dose Inhalation Q4H WA     Continuous Infusions:   sodium chloride       PRN Meds:perflutren lipid microspheres, potassium chloride **OR** potassium alternative oral replacement **OR** potassium chloride, guaiFENesin-codeine, sodium chloride flush, sodium chloride, acetaminophen, ondansetron **OR** ondansetron, oxyCODONE-acetaminophen **AND** oxyCODONE    OBJECTIVE:  /67   Pulse 93   Temp 97.1 °F (36.2 °C) (Oral)   Resp 18   Ht 6' 1\" (1.854 m)   Wt 194 lb 7 oz (88.2 kg)   SpO2 92%   BMI 25.65 kg/m²   Temp  Av.6 °F (36.4 °C)  Min: 97.1 °F (36.2 °C)  Max: 97.8 °F (36.6 °C)  Constitutional: The patient is Sitting up in bed. He is awake and in no distress. O2 by nasal cannula at 5 L. Skin: Warm and dry. No rashes were noted. HEENT: Round and reactive pupils. Moist mucous membranes. No ulcerations or thrush. Neck: Supple to movements. Chest: No use of accessory muscles to breathe. Symmetrical expansion. Minimal crackles on bases. Cardiovascular: S1 and S2 are rhythmic and regular. No murmurs appreciated.    Abdomen: Positive bowel sounds to
[facility]  [address] [facility], [address], [address] [facility]      Pulmonary Progress Note      SUBJECTIVE:  sitting up in bed on 5 li HFNC, similar with dyspnea/cough, started on antibiotics due to sputum culture.     OBJECTIVE    Medications    Continuous Infusions:   sodium chloride         Scheduled Meds:   predniSONE  30 mg Oral Daily    levoFLOXacin  750 mg Oral Daily    tobramycin (PF)  300 mg Nebulization BID    budesonide  0.5 mg Nebulization BID    arformoterol tartrate  15 mcg Nebulization BID    guaiFENesin  400 mg Oral 4x Daily    pantoprazole  40 mg Oral QAM AC    azithromycin  500 mg Oral Q MWF    sodium chloride flush  5-40 mL IntraVENous 2 times per day    enoxaparin  40 mg SubCUTAneous Daily    ipratropium 0.5 mg-albuterol 2.5 mg  1 Dose Inhalation Q4H WA       PRN Meds:perflutren lipid microspheres, potassium chloride **OR** potassium alternative oral replacement **OR** potassium chloride, guaiFENesin-codeine, sodium chloride flush, sodium chloride, acetaminophen, ondansetron **OR** ondansetron, oxyCODONE-acetaminophen **AND** oxyCODONE    Physical    VITALS:  /67   Pulse 93   Temp 97.1 °F (36.2 °C) (Oral)   Resp 18   Ht 6' 1\" (1.854 m)   Wt 194 lb 7 oz (88.2 kg)   SpO2 92%   BMI 25.65 kg/m²     24HR INTAKE/OUTPUT:    No intake or output data in the 24 hours ending 23 0921      24HR PULSE OXIMETRY RANGE:    SpO2  Av.7 %  Min: 90 %  Max: 95 %    General appearance: alert, appears stated age, and cooperative  Lungs: rhonchi bibasilar  Heart: regular rate and rhythm, S1, S2 normal, no murmur, click, rub or gallop  Abdomen: soft, non-tender; bowel sounds normal; no masses,  no organomegaly  Extremities: extremities normal, atraumatic, no cyanosis or edema  Neurologic: Mental status: Alert, oriented, thought content appropriate    Data    CBC:   Recent Labs     23  0416 06/15/23  0407 23  0414   WBC 8.4
[facility] [address] [facility]  [address] [facility], [address], 17 [facility]      Pulmonary Progress Note      SUBJECTIVE:  sitting up in bed on 6 li HFNC still saturating 90-92%, similar with dyspnea/cough (?) close to baseline, using incentive spirometer prior to examination.     OBJECTIVE    Medications    Continuous Infusions:   sodium chloride         Scheduled Meds:   methylPREDNISolone sodium succ  30 mg IntraVENous Q12H    budesonide  0.5 mg Nebulization BID    arformoterol tartrate  15 mcg Nebulization BID    guaiFENesin  400 mg Oral 4x Daily    pantoprazole  40 mg Oral QAM AC    azithromycin  500 mg Oral Q MWF    sodium chloride flush  5-40 mL IntraVENous 2 times per day    enoxaparin  40 mg SubCUTAneous Daily    ipratropium 0.5 mg-albuterol 2.5 mg  1 Dose Inhalation Q4H WA       PRN Meds:perflutren lipid microspheres, potassium chloride **OR** potassium alternative oral replacement **OR** potassium chloride, guaiFENesin-codeine, sodium chloride flush, sodium chloride, acetaminophen, ondansetron **OR** ondansetron, oxyCODONE-acetaminophen **AND** oxyCODONE    Physical    VITALS:  /81   Pulse 66   Temp 97.9 °F (36.6 °C) (Oral)   Resp 18   Ht 6' 1\" (1.854 m)   Wt 197 lb (89.4 kg)   SpO2 95%   BMI 25.99 kg/m²     24HR INTAKE/OUTPUT:      Intake/Output Summary (Last 24 hours) at 6/15/2023 0829  Last data filed at 2023 1143  Gross per 24 hour   Intake 420 ml   Output --   Net 420 ml         24HR PULSE OXIMETRY RANGE:    SpO2  Av.3 %  Min: 91 %  Max: 95 %    General appearance: alert, appears stated age, and cooperative  Lungs: rhonchi bibasilar  Heart: regular rate and rhythm, S1, S2 normal, no murmur, click, rub or gallop  Abdomen: soft, non-tender; bowel sounds normal; no masses,  no organomegaly  Extremities: extremities normal, atraumatic, no cyanosis or edema  Neurologic: Mental status: Alert, oriented, thought content
[facility] [address] [facility]  [address] [facility], [address], 17 [facility]      Pulmonary Progress Note      SUBJECTIVE:  sitting up in bed on 7 li HFNC still saturating 90-92%, claims still getting a bit sob with activity (?) close to baseline.     OBJECTIVE    Medications    Continuous Infusions:   sodium chloride         Scheduled Meds:   methylPREDNISolone sodium succ  30 mg IntraVENous 3 times per day    budesonide  0.5 mg Nebulization BID    arformoterol tartrate  15 mcg Nebulization BID    guaiFENesin  400 mg Oral 4x Daily    pantoprazole  40 mg Oral QAM AC    azithromycin  500 mg Oral Q MWF    sodium chloride flush  5-40 mL IntraVENous 2 times per day    enoxaparin  40 mg SubCUTAneous Daily    ipratropium 0.5 mg-albuterol 2.5 mg  1 Dose Inhalation Q4H WA       PRN Meds:perflutren lipid microspheres, potassium chloride **OR** potassium alternative oral replacement **OR** potassium chloride, guaiFENesin-codeine, sodium chloride flush, sodium chloride, acetaminophen, ondansetron **OR** ondansetron, oxyCODONE-acetaminophen **AND** oxyCODONE    Physical    VITALS:  /80   Pulse 80   Temp 97.5 °F (36.4 °C) (Oral)   Resp 18   Ht 6' 1\" (1.854 m)   Wt 198 lb 12.8 oz (90.2 kg)   SpO2 92%   BMI 26.23 kg/m²     24HR INTAKE/OUTPUT:      Intake/Output Summary (Last 24 hours) at 2023 0829  Last data filed at 2023 0827  Gross per 24 hour   Intake 960 ml   Output --   Net 960 ml         24HR PULSE OXIMETRY RANGE:    SpO2  Av.4 %  Min: 90 %  Max: 97 %    General appearance: alert, appears stated age, and cooperative  Lungs: rhonchi bibasilar  Heart: regular rate and rhythm, S1, S2 normal, no murmur, click, rub or gallop  Abdomen: soft, non-tender; bowel sounds normal; no masses,  no organomegaly  Extremities: extremities normal, atraumatic, no cyanosis or edema  Neurologic: Mental status: Alert, oriented, thought content appropriate    Data    CBC:
[facility]  [address] [facility], [address], 17 [facility]      Pulmonary Progress Note      SUBJECTIVE:  sitting up in bed on 7 li HFNC saturating 90-92%, mention of desaturating with ambulation on 6 li NC with PT, claims still getting a bit sob with activity, not yet at baseline.     OBJECTIVE    Medications    Continuous Infusions:   sodium chloride         Scheduled Meds:   budesonide  0.5 mg Nebulization BID    methylPREDNISolone sodium succ  40 mg IntraVENous Q8H    arformoterol tartrate  15 mcg Nebulization BID    guaiFENesin  400 mg Oral 4x Daily    pantoprazole  40 mg Oral QAM AC    azithromycin  500 mg Oral Q MWF    sodium chloride flush  5-40 mL IntraVENous 2 times per day    enoxaparin  40 mg SubCUTAneous Daily    ipratropium 0.5 mg-albuterol 2.5 mg  1 Dose Inhalation Q4H WA       PRN Meds:perflutren lipid microspheres, potassium chloride **OR** potassium alternative oral replacement **OR** potassium chloride, guaiFENesin-codeine, sodium chloride flush, sodium chloride, acetaminophen, ondansetron **OR** ondansetron, oxyCODONE-acetaminophen **AND** oxyCODONE    Physical    VITALS:  BP (!) 141/83   Pulse 67   Temp 97.6 °F (36.4 °C) (Oral)   Resp 18   Ht 6' 1\" (1.854 m)   Wt 195 lb 8 oz (88.7 kg)   SpO2 92%   BMI 25.79 kg/m²     24HR INTAKE/OUTPUT:    No intake or output data in the 24 hours ending 23 1424      24HR PULSE OXIMETRY RANGE:    SpO2  Av.9 %  Min: 92 %  Max: 100 %    General appearance: alert, appears stated age, and cooperative  Lungs: rhonchi bibasilar  Heart: regular rate and rhythm, S1, S2 normal, no murmur, click, rub or gallop  Abdomen: soft, non-tender; bowel sounds normal; no masses,  no organomegaly  Extremities: extremities normal, atraumatic, no cyanosis or edema  Neurologic: Mental status: Alert, oriented, thought content appropriate    Data    CBC:   Recent Labs     23  1126 23  0200
New patient consult placed with infectious disease, left message with office.
Nutrition Assessment    Type and Reason for Visit:  Initial, RD Nutrition Re-Screen/LOS    Nutrition Recommendations/Plan:   Continue current diet, as tolerated  Continue inpatient monitoring       Nutrition Assessment:    Pt admit 2/2 exacerbation COPD and probable bronchitis with pseudomonas. PMH includes CHF, COPD glucose intolerance. Pt has been eating well on current Regular diet and blood sugars controlled at this time. No additional nutrition recommendations at this time. Continue inpatient monitoring    Nutrition Related Findings:    A&Ox4, no edema, soft abd +BS, +I/O 3.1L, on NC Wound Type: None       Current Nutrition Intake & Therapies:    Average Meal Intake: %  Average Supplements Intake: None Ordered  ADULT DIET; Regular    Anthropometric Measures:  Height: 6' 1\" (185.4 cm)  Ideal Body Weight (IBW): 184 lbs (84 kg)    Admission Body Weight: 199 lb 3.2 oz (90.4 kg) (6/10 bedscale)  Current Body Weight: 194 lb 7 oz (88.2 kg), 105.7 % IBW. Weight Source: Bed Scale (6/16)  Current BMI (kg/m2): 25.7  Usual Body Weight: 192 lb 5 oz (87.2 kg) (per EMR x 6 mo)  % Weight Change (Calculated): 1.1                    BMI Categories: Overweight (BMI 25.0-29. 9)      Nutrition Interventions:   Food and/or Nutrient Delivery: Continue Current Diet  Nutrition Education/Counseling: Education not indicated  Coordination of Nutrition Care: Continue to monitor while inpatient       Goals:     Goals: PO intake 75% or greater, by next RD assessment       Nutrition Monitoring and Evaluation:   Behavioral-Environmental Outcomes: None Identified  Food/Nutrient Intake Outcomes: Food and Nutrient Intake  Physical Signs/Symptoms Outcomes: Biochemical Data, GI Status, Fluid Status or Edema, Nutrition Focused Physical Findings, Skin, Weight    Discharge Planning:    No discharge needs at this time     Geneva Mendez, RD, CNSC, LD  Contact: x [phone]
Occupational Therapy  OCCUPATIONAL THERAPY INITIAL EVALUATION  [facility], [place] [address]    Date: 2023     Patient Name: Srinivasan Corona  MRN: [id number]  : 1950  Room: [address] [place] [address]    Evaluating OT: SHASHANK Bocanegra [id number]    Referring Provider: Jenniffer Purcell DO  Specific Provider Orders/Date: \"OT eval and treat\" - 2023    Diagnosis: Acute pulmonary edema ([address] [J81.0]  COPD exacerbation ([facility] [J44.1]  Acute on chronic respiratory failure (HCC) [J96.20]  Acute on chronic respiratory failure with hypoxia ([facility] [J96.21]      Pertinent Medical History: emphysema, chronic back pain, COPD, lumbar fusion, chronic respiratory failure     Precautions: O2    Assessment of Current Deficits:    [] Functional mobility   []ADLs  [] Strength               []Cognition   [] Functional transfers   [] IADLs         [] Safety Awareness   []Endurance   [] Fine Coordination              [] Balance      [] Vision/perception   []Sensation    []Gross Motor Coordination  [] ROM  [] Delirium                   [] Motor Control     OT PLAN OF CARE     Recommended Adaptive Equipment: none     Home Living: Patient lives with his wife in a 1-floor home with 2 steps and 1 rail to enter; patient's bedroom and bathroom are on the main floor. Bathroom Setup: walk in shower with shower chair  Equipment Owned: shower chair    Prior Level of Function (PLOF): Per patient, patient was independent with ADLs, independent with IADLs, and independent with functional mobility (no device) prior to this hospitalization.   Driving: yes    Pain Level: Patient reports pain in L hip, did not rate pain   Cognition: Patient alert and oriented, pleasant, cooperative, able to follow simple directions  Memory: WFL  Sequencing: WFL  Problem Solving: WFL  Judgement/Safety: WFL    Functional Assessment:  AM-PAC Daily Activity Raw Score:    Initial Eval Status  Date:
PROGRESS  NOTE --                                                          INTERNAL  MEDICINE                                                                              I  PERSONALLY SAW , EXAMINED, AND CARED [facility], 6/12/2023     LABS, XRAY ,CHART, AND MEDICATIONS  REVIEWED BY ME       Chief complaint: Short of breath      6/12/2023-SUBJECTIVE: Mindy Maravilla is alert awake and cooperative; oriented ×3. Denies any chest pain nausea emesis. Tolerating diet. No abdominal pain. Currently resting quietly; he states earlier he was up walking with personnel in the hallway without his oxygen. He returned to his room return to the bathroom and noticed he was significantly short of breath without his nasal cannula oxygen. Feels better at this moment. Afebrile last 24 hours. Blood pressure 136/73. SPO2 98 on high flow nasal cannula at 9 L/min. Intake and output +610 cc. Ionized calcium slightly elevated 1.34. Total calcium normal 9.8. Lactic acid elevated 2.2 fasting glucose 143. Potassium 4.4 magnesium 1.9 BUN 15 creatinine 0.7. proBNP 511. LDL 56. Liver functions normal.  WBC 7.3 hemoglobin 16.6. ESR 6. Procalcitonin normal at 0.04. Molecular/viral respiratory panel all not detected. Stool for occult blood in process. Blood cultures in process. Legionella strep antigens in process. Cardiology consult yesterday appreciated. proBNP greater than 1800; recent 2D echo did not reveal pulmonary hypertension although that was suggested by right ventricular enlargement on echo as well as nuclear stress test.  Maintain blood pressure no higher than 140; preferably 120. Low-dose diuretic to unload right ventricle; add low-dose spinal lactone. Pulmonary note from today, sitting up in bed 9 L high flow nasal cannula. Still getting a little short of breath but better than admission.   Continue steroids taper as
PROGRESS  NOTE --                                                          INTERNAL  MEDICINE                                                                              I  PERSONALLY SAW , EXAMINED, AND CARED [facility], 6/16/2023     LABS, XRAY ,CHART, AND MEDICATIONS  REVIEWED BY ME       Chief complaint: Short of breath      6/12/2023-SUBJECTIVE: Angie Wylie is alert awake and cooperative; oriented ×3. Denies any chest pain nausea emesis. Tolerating diet. No abdominal pain. Currently resting quietly; he states earlier he was up walking with personnel in the hallway without his oxygen. He returned to his room return to the bathroom and noticed he was significantly short of breath without his nasal cannula oxygen. Feels better at this moment. Afebrile last 24 hours. Blood pressure 136/73. SPO2 98 on high flow nasal cannula at 9 L/min. Intake and output +610 cc. Ionized calcium slightly elevated 1.34. Total calcium normal 9.8. Lactic acid elevated 2.2 fasting glucose 143. Potassium 4.4 magnesium 1.9 BUN 15 creatinine 0.7. proBNP 511. LDL 56. Liver functions normal.  WBC 7.3 hemoglobin 16.6. ESR 6. Procalcitonin normal at 0.04. Molecular/viral respiratory panel all not detected. Stool for occult blood in process. Blood cultures in process. Legionella strep antigens in process. Cardiology consult yesterday appreciated. proBNP greater than 1800; recent 2D echo did not reveal pulmonary hypertension although that was suggested by right ventricular enlargement on echo as well as nuclear stress test.  Maintain blood pressure no higher than 140; preferably 120. Low-dose diuretic to unload right ventricle; add low-dose spinal lactone. Pulmonary note from today, sitting up in bed 9 L high flow nasal cannula. Still getting a little short of breath but better than admission.   Continue steroids taper as
PROGRESS  NOTE --                                                          INTERNAL  MEDICINE                                                                              I  PERSONALLY SAW , EXAMINED, AND CARED [address] [facility], 6/14/2023     LABS, XRAY ,CHART, AND MEDICATIONS  REVIEWED BY ME       Chief complaint: Short of breath      6/12/2023-SUBJECTIVE: Shae Snow is alert awake and cooperative; oriented ×3. Denies any chest pain nausea emesis. Tolerating diet. No abdominal pain. Currently resting quietly; he states earlier he was up walking with personnel in the hallway without his oxygen. He returned to his room return to the bathroom and noticed he was significantly short of breath without his nasal cannula oxygen. Feels better at this moment. Afebrile last 24 hours. Blood pressure 136/73. SPO2 98 on high flow nasal cannula at 9 L/min. Intake and output +610 cc. Ionized calcium slightly elevated 1.34. Total calcium normal 9.8. Lactic acid elevated 2.2 fasting glucose 143. Potassium 4.4 magnesium 1.9 BUN 15 creatinine 0.7. proBNP 511. LDL 56. Liver functions normal.  WBC 7.3 hemoglobin 16.6. ESR 6. Procalcitonin normal at 0.04. Molecular/viral respiratory panel all not detected. Stool for occult blood in process. Blood cultures in process. Legionella strep antigens in process. Cardiology consult yesterday appreciated. proBNP greater than 1800; recent 2D echo did not reveal pulmonary hypertension although that was suggested by right ventricular enlargement on echo as well as nuclear stress test.  Maintain blood pressure no higher than 140; preferably 120. Low-dose diuretic to unload right ventricle; add low-dose spinal lactone. Pulmonary note from today, sitting up in bed 9 L high flow nasal cannula. Still getting a little short of breath but better than admission.   Continue steroids taper as
PROGRESS  NOTE --                                                          INTERNAL  MEDICINE                                                                              I  PERSONALLY SAW , EXAMINED, AND CARED [address] [facility], 6/15/2023     LABS, XRAY ,CHART, AND MEDICATIONS  REVIEWED BY ME       Chief complaint: Short of breath      6/12/2023-SUBJECTIVE: Vaughn Gimenez is alert awake and cooperative; oriented ×3. Denies any chest pain nausea emesis. Tolerating diet. No abdominal pain. Currently resting quietly; he states earlier he was up walking with personnel in the hallway without his oxygen. He returned to his room return to the bathroom and noticed he was significantly short of breath without his nasal cannula oxygen. Feels better at this moment. Afebrile last 24 hours. Blood pressure 136/73. SPO2 98 on high flow nasal cannula at 9 L/min. Intake and output +610 cc. Ionized calcium slightly elevated 1.34. Total calcium normal 9.8. Lactic acid elevated 2.2 fasting glucose 143. Potassium 4.4 magnesium 1.9 BUN 15 creatinine 0.7. proBNP 511. LDL 56. Liver functions normal.  WBC 7.3 hemoglobin 16.6. ESR 6. Procalcitonin normal at 0.04. Molecular/viral respiratory panel all not detected. Stool for occult blood in process. Blood cultures in process. Legionella strep antigens in process. Cardiology consult yesterday appreciated. proBNP greater than 1800; recent 2D echo did not reveal pulmonary hypertension although that was suggested by right ventricular enlargement on echo as well as nuclear stress test.  Maintain blood pressure no higher than 140; preferably 120. Low-dose diuretic to unload right ventricle; add low-dose spinal lactone. Pulmonary note from today, sitting up in bed 9 L high flow nasal cannula. Still getting a little short of breath but better than admission.   Continue steroids taper as
PROGRESS NOTE       PATIENT PROBLEM LIST:  Patient Active Problem List   Diagnosis Code    COPD (chronic obstructive pulmonary disease) ([facility] J44.9    Thoracic ascending aortic aneurysm I71.21    S/P lumbar fusion Z98.1    Hypophosphatemia E83.39    Glucose intolerance E74.39    Hilar adenopathy R59.0    Pulmonary Mycobacterium avium complex (MAC) infection ([facility]) A31.0    Acute heart failure with preserved ejection fraction ([facility]) I50.31    Chronic respiratory failure with hypoxia ([facility]) J96.11    Chronic pain syndrome G89.4    Lumbar degenerative disc disease M51.36    Chronic low back pain M54.50, G89.29    Immunocompromised ([facility]) D84.9    Iron deficiency E61.1    Acute on chronic respiratory failure ([facility]) J96.20    Acute on chronic respiratory failure with hypoxia ([facility] J96.21       SUBJECTIVE:  Singh Age states ***    REVIEW OF SYSTEMS:  General ROS: negative for - fatigue, malaise,  weight gain or weight loss  Psychological ROS: negative for - anxiety , depression  Ophthalmic ROS: negative for - decreased vision or visual distortion. ENT ROS: negative  Allergy and Immunology ROS: negative  Hematological and Lymphatic ROS: negative  Endocrine: no heat or cold intolerance and no polyphagia, polydipsia, or polyuria  Respiratory ROS: positive for - {:775798}  Cardiovascular ROS: positive for - {:[id number]}. Gastrointestinal ROS: no abdominal pain, change in bowel habits, or black or bloody stools  Genito-Urinary ROS: no nocturia, dysuria, trouble voiding, frequency or hematuria  Musculoskeletal ROS: negative for- myalgias, arthralgias, or claudication  Neurological ROS: no TIA or stroke symptoms otherwise no significant change in symptoms or problems since yesterday as documented in previous progress notes.     SCHEDULED MEDICATIONS:   budesonide  0.5 mg Nebulization BID    methylPREDNISolone sodium succ  40 mg IntraVENous Q8H    arformoterol tartrate  15 mcg Nebulization BID    guaiFENesin  400 mg Oral 4x Daily
Physical Therapy  Facility/Department: [facility] INTERNAL MEDICINE 2  Physical Therapy Initial Assessment    Name: Vincenzo Cooley  : 1950  MRN: [id number]  Date of Service: 2023      Patient Diagnosis(es): The primary encounter diagnosis was Acute on chronic respiratory failure with hypoxia ([address] Diagnoses of COPD exacerbation ([address] and Acute pulmonary edema ([address] were also pertinent to this visit. 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 Past Surgical History:  has a past surgical history that includes Abscess Drainage (); Colonoscopy (2013); lumbar fusion (2019); bronchoscopy (N/A, 2019); bronchoscopy (N/A, 10/07/2019); Insert Picc Cath, 5/> Yrs (04/10/2021); bronchoscopy (N/A, 2021); and Colonoscopy (N/A, 2023).       Evaluating Therapist: Sunitha Armenta PT    Room #:  9543/3809-G  Diagnosis:  Acute pulmonary edema ([address] [J81.0]  COPD exacerbation ([facility] [J44.1]  Acute on
Pulse ox on RA sitting =85%; Pulse ox on RA ambulating, dropped to 77%. Pulse ox on 5 liters sitting recovery 93%.
Recent Labs     06/10/23  1215 06/11/23  1126 06/12/23  0200   WBC 7.2 7.4 7.3   HGB 17.3* 16.4 16.6*   HCT 52.0 49.1 49.3   MCV 91.9 90.8 90.3    201 217       BMP:  Recent Labs     06/10/23  1215 06/11/23  1126 06/12/23  0200    140 139   K 4.9 3.3* 4.4    101 102   CO2 25 27 26   PHOS  --  2.8 3.0   BUN 9 11 15   CREATININE 0.8 0.8 0.7    ALB:3,BILIDIR:3,BILITOT:3,ALKPHOS:3)@    PT/INR: No results for input(s): PROTIME, INR in the last 72 hours. ABG:   Recent Labs     06/10/23  1315   PH 7.407   PO2 102.8*   PCO2 35.8   HCO3 22.0   BE -1.9   O2SAT 98.0   METHB 0.0   O2HB 96.4   COHB 1.6*   O2CON 22.8   HHB 2.0   THB 16.8*             Radiology/Other tests reviewed: none    Assessment:     Principal Problem:    Acute on chronic respiratory failure with hypoxia (HCC)  Active Problems:    COPD (chronic obstructive pulmonary disease) (HCC)    Glucose intolerance    Pulmonary Mycobacterium avium complex (MAC) infection (HCC)    Acute heart failure with preserved ejection fraction (HCC)    Chronic respiratory failure with hypoxia (HCC)    Immunocompromised (HCC)    Acute on chronic respiratory failure (HCC)  Resolved Problems:    * No resolved hospital problems. *      Plan:       Cont with steroids, taper as tolerated  Procal and viral panel (-), awaiting infectious titers, can hold off on antibiotics for now, CXR tomorrow  Watch fluid balance, diurese as per PCP and Cardiology  Cont with nebs, observe respiratory function  Cont with oxygen, taper as tolerated  OOB to chair, ambulate with assistance      Time at the bedside, reviewing labs and radiographs, reviewing notes and consultations, discussing with staff and family was more than 35 minutes. Thanks for letting us see this patient in consultation. Please contact us with any questions. Office [phone] or after hours through [employer] x [phone].
Kait Higgins and reviewing notes and laboratory data, with greater than 50% of this time instructing and counseling the patient *** face to face regarding my findings and recommendations and I have answered all questions as posed to me by Mr. Kait Higgins. Lucina Little, DO FACP,FACC,[facility]      NOTE:  This report was transcribed using voice recognition software.   Every effort was made to ensure accuracy; however, inadvertent computerized transcription errors may be present
daily  DuoNeb every 4 hours while awake  Methylprednisolone 40 mg IV every 8 hours  Robitussin with codeine 5 cc every 4 hours as needed for cough  Percocet 5 mg, 1 tablet as needed every 12 hours  Potassium replacement protocol        Discussed with patient and nursing. hCaru Juan C Jaimes DO     1:39 PM     6/13/2023    TIME > 35 MINUTES    Please note that over 35 minutes was spent . Greater than 51% includes interviewing patient and reviewing chart; performing medical examination; ordering medications, tests and/or procedures; formulating assessment plan, reviewing rationale for the above recommendations; reviewing available records, results of all previously ordered tests and procedures and current problem list; counseling/educating the patient; coordinating care with other healthcare professionals; communicating results to the patient's family/caregiver; documenting clinical information in the electronic record                ------------  INFORMATION  -----------      DIET:ADULT DIET; Regular      No Known Allergies      MEDICATION SIDE EFFECTS:none       SCHEDULED MEDS:                                 Scheduled Meds:   budesonide  0.5 mg Nebulization BID    methylPREDNISolone sodium succ  40 mg IntraVENous Q8H    arformoterol tartrate  15 mcg Nebulization BID    guaiFENesin  400 mg Oral 4x Daily    pantoprazole  40 mg Oral QAM AC    azithromycin  500 mg Oral Q MWF    sodium chloride flush  5-40 mL IntraVENous 2 times per day    enoxaparin  40 mg SubCUTAneous Daily    ipratropium 0.5 mg-albuterol 2.5 mg  1 Dose Inhalation Q4H WA       Continuous Infusions:   sodium chloride           Data:     No intake or output data in the 24 hours ending 06/13/23 1339      Wt Readings from Last 3 Encounters:   06/13/23 195 lb 8 oz (88.7 kg)   06/01/23 192 lb (87.1 kg)   05/16/23 190 lb (86.2 kg)       Labs: Additional    GLUCOSE:No results for input(s): POCGLU in the last 72 hours.     BNP:No results found for:

## 2023-06-16 NOTE — PLAN OF CARE
Problem: Respiratory - Adult  Goal: Achieves optimal ventilation and oxygenation  Outcome: Progressing     Problem: Skin/Tissue Integrity  Goal: Absence of new skin breakdown  Description: 1. Monitor for areas of redness and/or skin breakdown  2. Assess vascular access sites hourly  3. Every 4-6 hours minimum:  Change oxygen saturation probe site  4. Every 4-6 hours:  If on nasal continuous positive airway pressure, respiratory therapy assess nares and determine need for appliance change or resting period.   Outcome: Progressing     Problem: Safety - Adult  Goal: Free from fall injury  Outcome: Progressing     Problem: ABCDS Injury Assessment  Goal: Absence of physical injury  Outcome: Progressing

## 2023-06-19 ENCOUNTER — CARE COORDINATION (OUTPATIENT)
Dept: CASE MANAGEMENT | Age: 73
End: 2023-06-19

## 2023-06-19 NOTE — CARE COORDINATION
[facility] Care Transitions Initial Follow Up Call    Call within 2 business days of discharge: Yes    -First attempt to reach the patient for initial Care Transition call post hospital discharge. Message left with CTN's contact information requesting return phone call. Patient: Angel Sosa Patient : 1950   MRN: [id number]  Reason for Admission: Acute on chronic respiratory failure with hypoxia  Discharge Date: 23 RARS: Readmission Risk Score: 9.1      Last Discharge  Street       Date Complaint Diagnosis Description Type Department Provider    6/10/23 Shortness of Breath Acute on chronic respiratory failure with hypoxia ([facility] . .. ED to Hosp-Admission (Discharged) (ADMITTED) [address], DO; Kathleen Gross-. ..               Follow Up  Future Appointments   Date Time Provider Tan Miranda   2023  2:45 PM [address] [place] [address] [facility]   8/15/2023  9:30 AM Taj Vences MD [facility]   10/10/2023  1:45 PM MIGUE Victoria - NP [facility]   2023  2:00 PM Stefani Banegas MD [facility]         Sarah Vergara RN

## 2023-06-20 ENCOUNTER — TELEPHONE (OUTPATIENT)
Dept: PRIMARY CARE CLINIC | Age: 73
End: 2023-06-20

## 2023-06-20 ENCOUNTER — CARE COORDINATION (OUTPATIENT)
Dept: CASE MANAGEMENT | Age: 73
End: 2023-06-20

## 2023-06-20 DIAGNOSIS — J96.21 ACUTE ON CHRONIC RESPIRATORY FAILURE WITH HYPOXIA (HCC): Primary | ICD-10-CM

## 2023-06-20 PROCEDURE — 1111F DSCHRG MED/CURRENT MED MERGE: CPT | Performed by: INTERNAL MEDICINE

## 2023-06-20 NOTE — TELEPHONE ENCOUNTER
..Care Transitions Initial Follow Up Call    Outreach made within 2 business days of discharge: Yes    Patient: John Oconnor Patient : 1950   MRN: [id number]  Reason for Admission: There are no discharge diagnoses documented for the most recent discharge. Discharge Date: 23       Spoke with: pt    Discharge department/facility: [facility] Interactive Patient Contact:  Was patient able to fill all prescriptions: Yes  Was patient instructed to bring all medications to the follow-up visit: Yes  Is patient taking all medications as directed in the discharge summary?  Yes  Does patient understand their discharge instructions: Yes  Does patient have questions or concerns that need addressed prior to 7-14 day follow up office visit: no    Scheduled appointment with PCP within 7-14 days    Follow Up  Future Appointments   Date Time Provider Tan Miranda   7/3/2023  3:00 PM MD WARD Diana [facility]   2023  2:45 PM DO ANJANA Millan [facility]   8/15/2023  9:30 AM Gabriela Sams MD [facility]   10/10/2023  1:45 PM MIGUE Juarez NP [facility]   2023  2:00 PM MD WARD Diana [address]

## 2023-06-20 NOTE — CARE COORDINATION
[facility] Care Transitions Initial Follow Up Call    Call within 2 business days of discharge: Yes    Patient Current Location:  Home: [address] [place] [address]    Care Transition Nurse contacted the patient by telephone to perform post hospital discharge assessment. Verified name and  with patient as identifiers. Provided introduction to self, and explanation of the Care Transition Nurse role. Patient: Armida Akbar Patient : 1950   MRN: [id number]  Reason for Admission: Acute on chronic respiratory failure with hypoxia  Discharge Date: 23 RARS: Readmission Risk Score: 9.1      Last Discharge [address]       Date Complaint Diagnosis Description Type Department Provider    6/10/23 Shortness of Breath Acute on chronic respiratory failure with hypoxia ([facility] . .. ED to Hosp-Admission (Discharged) (ADMITTED) [address], DO; Kinsey Rueda-. .. Was this an external facility discharge? No Discharge Facility: [facility]    Challenges to be reviewed by the provider   Additional needs identified to be addressed with provider: No  none               Method of communication with provider: none.      -Spoke with patient for initial care transition call post hospital discharge.   -Patient admitted to [facility] on 6/10/23 with symptoms of SOB needing to increase home oxygen up to 6-7L NC. Oximeter 88% upon presentation to the ER.  -Patient voicing improvement, has been eating/drinking/sleeping well. Bladder/bowel pattern WNL. -Drives himself to any appointments.  -Patient denies any needs, questions, or concerns at this time and politely declines follow up care transition calls. -CTN to sign off. Care Transition Nurse reviewed discharge instructions, medical action plan, and red flags with patient who verbalized understanding. The patient was given an opportunity to ask questions and does not have any further questions or concerns at this time.  Were discharge instructions

## 2023-07-24 ENCOUNTER — OFFICE VISIT (OUTPATIENT)
Dept: PRIMARY CARE CLINIC | Age: 73
End: 2023-07-24
Payer: MEDICARE

## 2023-07-24 VITALS
HEIGHT: 73 IN | OXYGEN SATURATION: 84 % | SYSTOLIC BLOOD PRESSURE: 120 MMHG | BODY MASS INDEX: 25.18 KG/M2 | HEART RATE: 85 BPM | TEMPERATURE: 98.2 F | DIASTOLIC BLOOD PRESSURE: 70 MMHG | WEIGHT: 190 LBS | RESPIRATION RATE: 18 BRPM

## 2023-07-24 DIAGNOSIS — J06.9 UPPER RESPIRATORY TRACT INFECTION, UNSPECIFIED TYPE: Primary | ICD-10-CM

## 2023-07-24 PROCEDURE — 1036F TOBACCO NON-USER: CPT | Performed by: INTERNAL MEDICINE

## 2023-07-24 PROCEDURE — 99213 OFFICE O/P EST LOW 20 MIN: CPT | Performed by: INTERNAL MEDICINE

## 2023-07-24 PROCEDURE — 1123F ACP DISCUSS/DSCN MKR DOCD: CPT | Performed by: INTERNAL MEDICINE

## 2023-07-24 PROCEDURE — G8427 DOCREV CUR MEDS BY ELIG CLIN: HCPCS | Performed by: INTERNAL MEDICINE

## 2023-07-24 PROCEDURE — G8417 CALC BMI ABV UP PARAM F/U: HCPCS | Performed by: INTERNAL MEDICINE

## 2023-07-24 PROCEDURE — 3017F COLORECTAL CA SCREEN DOC REV: CPT | Performed by: INTERNAL MEDICINE

## 2023-07-24 RX ORDER — AZITHROMYCIN 250 MG/1
250 TABLET, FILM COATED ORAL SEE ADMIN INSTRUCTIONS
Qty: 6 TABLET | Refills: 0 | Status: SHIPPED | OUTPATIENT
Start: 2023-07-24 | End: 2023-07-29

## 2023-07-24 RX ORDER — DOXYCYCLINE HYCLATE 100 MG
100 TABLET ORAL 2 TIMES DAILY
Qty: 14 TABLET | Refills: 0 | Status: SHIPPED
Start: 2023-07-24 | End: 2023-07-24

## 2023-07-24 SDOH — ECONOMIC STABILITY: FOOD INSECURITY: WITHIN THE PAST 12 MONTHS, THE FOOD YOU BOUGHT JUST DIDN'T LAST AND YOU DIDN'T HAVE MONEY TO GET MORE.: NEVER TRUE

## 2023-07-24 SDOH — ECONOMIC STABILITY: FOOD INSECURITY: WITHIN THE PAST 12 MONTHS, YOU WORRIED THAT YOUR FOOD WOULD RUN OUT BEFORE YOU GOT MONEY TO BUY MORE.: NEVER TRUE

## 2023-07-24 SDOH — ECONOMIC STABILITY: HOUSING INSECURITY
IN THE LAST 12 MONTHS, WAS THERE A TIME WHEN YOU DID NOT HAVE A STEADY PLACE TO SLEEP OR SLEPT IN A SHELTER (INCLUDING NOW)?: NO

## 2023-07-24 SDOH — ECONOMIC STABILITY: INCOME INSECURITY: HOW HARD IS IT FOR YOU TO PAY FOR THE VERY BASICS LIKE FOOD, HOUSING, MEDICAL CARE, AND HEATING?: NOT HARD AT ALL

## 2023-07-24 ASSESSMENT — PATIENT HEALTH QUESTIONNAIRE - PHQ9
SUM OF ALL RESPONSES TO PHQ QUESTIONS 1-9: 0
SUM OF ALL RESPONSES TO PHQ9 QUESTIONS 1 & 2: 0
SUM OF ALL RESPONSES TO PHQ QUESTIONS 1-9: 0
1. LITTLE INTEREST OR PLEASURE IN DOING THINGS: 0
2. FEELING DOWN, DEPRESSED OR HOPELESS: 0
SUM OF ALL RESPONSES TO PHQ QUESTIONS 1-9: 0
SUM OF ALL RESPONSES TO PHQ QUESTIONS 1-9: 0

## 2023-07-24 NOTE — PROGRESS NOTES
Janene Wises  7/24/23     Chief Complaint   Patient presents with    Nasal Congestion     Sob,coughing x 2 weeks         No Known Allergies     Current Outpatient Medications   Medication Sig Dispense Refill    azithromycin (ZITHROMAX) 250 MG tablet Take 1 tablet by mouth See Admin Instructions for 5 days 500mg on day 1 followed by 250mg on days 2 - 5 6 tablet 0    guaiFENesin 400 MG tablet Take 1 tablet by mouth 4 times daily 56 tablet 0    OXYGEN Inhale 4-4.5 L/min into the lungs continuous      formoterol (PERFOROMIST) 20 MCG/2ML nebulizer solution Take 2 mLs by nebulization 2 times daily 120 mL 5    oxyCODONE-acetaminophen (PERCOCET) 7.5-325 MG per tablet Take 1 tablet by mouth 2 times daily as needed for Pain. No current facility-administered medications for this visit. HPI: Patient comes in complaining of nasal and sinus congestion for the past few days. He is concerned that he may lead to a COPD exacerbation. He is requesting a prescription for an antibiotic. He denies any fever or chills. He remains on all of his usual meds and supplements the same as listed on his med list and he follows up with his pulmonologist on a regular basis. Review of Systems: as per HPI      Physical Exam:    Vitals:    07/24/23 1054   BP:    Pulse:    Resp:    Temp:    SpO2: (!) 84%       Patient is a 81 y.o. male. Patient appears to be in no distress. Breathing comfortably. Ambulates without assistance. HEENT: Mild nasal and sinus congestion. Small amount of thick postnasal drainage noted. Neck supple, no adenopathy or bruits. Heart RR, no MGR. Lungs: Breath sounds distant otherwise clear. Abd: normal  Ext: no edema. Peripheral pulses: normal.  No neurologic deficits noted. Assessment:    Vesta Azevedo was seen today for nasal congestion.     Diagnoses and all orders for this visit:    Upper respiratory tract infection, unspecified type  -     Discontinue: doxycycline hyclate (VIBRA-TABS) 100 MG tablet;

## 2023-08-01 ENCOUNTER — OFFICE VISIT (OUTPATIENT)
Dept: ENT CLINIC | Age: 73
End: 2023-08-01
Payer: MEDICARE

## 2023-08-01 VITALS
WEIGHT: 190 LBS | HEART RATE: 82 BPM | DIASTOLIC BLOOD PRESSURE: 75 MMHG | SYSTOLIC BLOOD PRESSURE: 118 MMHG | OXYGEN SATURATION: 90 % | HEIGHT: 73 IN | BODY MASS INDEX: 25.18 KG/M2

## 2023-08-01 DIAGNOSIS — H61.23 BILATERAL IMPACTED CERUMEN: Primary | ICD-10-CM

## 2023-08-01 PROCEDURE — 1123F ACP DISCUSS/DSCN MKR DOCD: CPT | Performed by: OTOLARYNGOLOGY

## 2023-08-01 PROCEDURE — 69210 REMOVE IMPACTED EAR WAX UNI: CPT | Performed by: OTOLARYNGOLOGY

## 2023-08-01 PROCEDURE — G8417 CALC BMI ABV UP PARAM F/U: HCPCS | Performed by: OTOLARYNGOLOGY

## 2023-08-01 PROCEDURE — G8427 DOCREV CUR MEDS BY ELIG CLIN: HCPCS | Performed by: OTOLARYNGOLOGY

## 2023-08-01 PROCEDURE — 3017F COLORECTAL CA SCREEN DOC REV: CPT | Performed by: OTOLARYNGOLOGY

## 2023-08-01 PROCEDURE — 99213 OFFICE O/P EST LOW 20 MIN: CPT | Performed by: OTOLARYNGOLOGY

## 2023-08-01 PROCEDURE — 1036F TOBACCO NON-USER: CPT | Performed by: OTOLARYNGOLOGY

## 2023-08-01 RX ORDER — IPRATROPIUM BROMIDE AND ALBUTEROL SULFATE 2.5; .5 MG/3ML; MG/3ML
1 SOLUTION RESPIRATORY (INHALATION) EVERY 4 HOURS
COMMUNITY

## 2023-08-01 ASSESSMENT — ENCOUNTER SYMPTOMS
ABDOMINAL PAIN: 0
SHORTNESS OF BREATH: 0
EYE DISCHARGE: 0
VOMITING: 0
EYES NEGATIVE: 1
COLOR CHANGE: 0
CHEST TIGHTNESS: 0
APNEA: 0
GASTROINTESTINAL NEGATIVE: 1
RESPIRATORY NEGATIVE: 1
DIARRHEA: 0
EYE PAIN: 0

## 2023-08-01 NOTE — PROGRESS NOTES
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; 11/15/18     Past Surgical History:   Procedure Laterality Date    ABSCESS DRAINAGE  2006    X2 RECTAL ABSCESS    BRONCHOSCOPY N/A 2019    BRONCHOSCOPY BRUSHINGS performed by Gil Altman MD at [address] [facility] N/A 10/07/2019    BRONCHOSCOPY DIAGNOSTIC OR CELL [address] ONLY performed by Gil Altman MD at [address] N/A 2021    BRONCHOSCOPY performed by Gil Altman MD at [address]  2013    COLONOSCOPY N/A 2023    COLONOSCOPY POLYPECTOMY SNARE/COLD BIOPSY performed by Yovani Rubio MD at [address] CATH, 5/> YRS  04/10/2021         LUMBAR FUSION  2019    [address] [facility]     Family History   Problem Relation Age of Onset    Other Mother          age 80    Other Father          age 80     Social History     Socioeconomic History    Marital status:      Spouse name: None    Number of children: None    Years of education: None    Highest education level: None   Tobacco Use    Smoking status: Former     Packs/day: 2.00     Years: 46.00     Pack years: 92.00     Types: Cigarettes     Start date: 10/12/1968     Quit date: 10/12/2016     Years since quittin.8    Smokeless tobacco: Never   Vaping Use    Vaping Use: Never used   Substance and Sexual Activity    Alcohol use: No    Drug use: No    Sexual activity: Defer     Social Determinants of Health     Financial Resource Strain: Low Risk     Difficulty of Paying Living Expenses: Not hard at all   Food Insecurity: No Food Insecurity    Worried About Running Out of Food in the Last Year: Never true    Ran Out of Food in the Last Year: Never true   Transportation Needs: Unknown    Lack of Transportation (Non-Medical):  No   Physical Activity: Inactive    Days of Exercise per Week: 0 days    Minutes of Exercise per Session: 0 min   Housing Stability: Unknown    Unstable

## 2023-08-07 ENCOUNTER — APPOINTMENT (OUTPATIENT)
Dept: GENERAL RADIOLOGY | Age: 73
End: 2023-08-07
Payer: MEDICARE

## 2023-08-07 ENCOUNTER — HOSPITAL ENCOUNTER (INPATIENT)
Age: 73
LOS: 3 days | Discharge: HOME | End: 2023-08-10
Attending: EMERGENCY MEDICINE | Admitting: INTERNAL MEDICINE
Payer: MEDICARE

## 2023-08-07 DIAGNOSIS — J44.1 COPD EXACERBATION (HCC): Primary | ICD-10-CM

## 2023-08-07 LAB
ALBUMIN SERPL-MCNC: 4.6 G/DL (ref 3.5–5.2)
ALP SERPL-CCNC: 79 U/L (ref 40–129)
ALT SERPL-CCNC: 11 U/L (ref 0–40)
ANION GAP SERPL CALCULATED.3IONS-SCNC: 10 MMOL/L (ref 7–16)
AST SERPL-CCNC: 17 U/L (ref 0–39)
BASOPHILS # BLD: 0.03 K/UL (ref 0–0.2)
BASOPHILS NFR BLD: 0 % (ref 0–2)
BILIRUB SERPL-MCNC: 0.7 MG/DL (ref 0–1.2)
BILIRUB UR QL STRIP: NEGATIVE
BUN SERPL-MCNC: 6 MG/DL (ref 6–23)
CALCIUM SERPL-MCNC: 10 MG/DL (ref 8.6–10.2)
CHLORIDE SERPL-SCNC: 104 MMOL/L (ref 98–107)
CLARITY UR: CLEAR
CO2 SERPL-SCNC: 27 MMOL/L (ref 22–29)
COLOR UR: YELLOW
COMMENT: NORMAL
CREAT SERPL-MCNC: 0.8 MG/DL (ref 0.7–1.2)
EKG ATRIAL RATE: 87 BPM
EKG P AXIS: 69 DEGREES
EKG P-R INTERVAL: 182 MS
EKG Q-T INTERVAL: 386 MS
EKG QRS DURATION: 84 MS
EKG QTC CALCULATION (BAZETT): 464 MS
EKG R AXIS: 110 DEGREES
EKG T AXIS: 82 DEGREES
EKG VENTRICULAR RATE: 87 BPM
EOSINOPHIL # BLD: 0.07 K/UL (ref 0.05–0.5)
EOSINOPHILS RELATIVE PERCENT: 1 % (ref 0–6)
ERYTHROCYTE [DISTWIDTH] IN BLOOD BY AUTOMATED COUNT: 15.8 % (ref 11.5–15)
GFR SERPL CREATININE-BSD FRML MDRD: >60 ML/MIN/1.73M2
GLUCOSE SERPL-MCNC: 90 MG/DL (ref 74–99)
GLUCOSE UR STRIP-MCNC: NEGATIVE MG/DL
HCT VFR BLD AUTO: 50.8 % (ref 37–54)
HGB BLD-MCNC: 17.2 G/DL (ref 12.5–16.5)
HGB UR QL STRIP.AUTO: NEGATIVE
IMM GRANULOCYTES # BLD AUTO: <0.03 K/UL (ref 0–0.58)
IMM GRANULOCYTES NFR BLD: 0 % (ref 0–5)
INFLUENZA A BY PCR: NOT DETECTED
INFLUENZA B BY PCR: NOT DETECTED
KETONES UR STRIP-MCNC: NEGATIVE MG/DL
LACTATE BLDV-SCNC: 1.9 MMOL/L (ref 0.5–1.9)
LEUKOCYTE ESTERASE UR QL STRIP: NEGATIVE
LYMPHOCYTES NFR BLD: 1.03 K/UL (ref 1.5–4)
LYMPHOCYTES RELATIVE PERCENT: 15 % (ref 20–42)
MCH RBC QN AUTO: 30.8 PG (ref 26–35)
MCHC RBC AUTO-ENTMCNC: 33.9 G/DL (ref 32–34.5)
MCV RBC AUTO: 91 FL (ref 80–99.9)
MONOCYTES NFR BLD: 0.47 K/UL (ref 0.1–0.95)
MONOCYTES NFR BLD: 7 % (ref 2–12)
NEUTROPHILS NFR BLD: 77 % (ref 43–80)
NEUTS SEG NFR BLD: 5.38 K/UL (ref 1.8–7.3)
NITRITE UR QL STRIP: NEGATIVE
PH UR STRIP: 6 [PH] (ref 5–9)
PLATELET # BLD AUTO: 197 K/UL (ref 130–450)
PMV BLD AUTO: 10.1 FL (ref 7–12)
POTASSIUM SERPL-SCNC: 3.7 MMOL/L (ref 3.5–5)
PROCALCITONIN SERPL-MCNC: 0.04 NG/ML (ref 0–0.08)
PROT SERPL-MCNC: 6.9 G/DL (ref 6.4–8.3)
PROT UR STRIP-MCNC: NEGATIVE MG/DL
RBC # BLD AUTO: 5.58 M/UL (ref 3.8–5.8)
SARS-COV-2 RDRP RESP QL NAA+PROBE: NOT DETECTED
SODIUM SERPL-SCNC: 141 MMOL/L (ref 132–146)
SP GR UR STRIP: 1.02 (ref 1–1.03)
SPECIMEN DESCRIPTION: NORMAL
UROBILINOGEN UR STRIP-ACNC: 0.2 EU/DL (ref 0–1)
WBC OTHER # BLD: 7 K/UL (ref 4.5–11.5)

## 2023-08-07 PROCEDURE — 93005 ELECTROCARDIOGRAM TRACING: CPT | Performed by: EMERGENCY MEDICINE

## 2023-08-07 PROCEDURE — 6360000002 HC RX W HCPCS: Performed by: EMERGENCY MEDICINE

## 2023-08-07 PROCEDURE — 1200000000 HC SEMI PRIVATE

## 2023-08-07 PROCEDURE — 6370000000 HC RX 637 (ALT 250 FOR IP): Performed by: INTERNAL MEDICINE

## 2023-08-07 PROCEDURE — 87635 SARS-COV-2 COVID-19 AMP PRB: CPT

## 2023-08-07 PROCEDURE — 80053 COMPREHEN METABOLIC PANEL: CPT

## 2023-08-07 PROCEDURE — 96375 TX/PRO/DX INJ NEW DRUG ADDON: CPT

## 2023-08-07 PROCEDURE — 6360000002 HC RX W HCPCS: Performed by: INTERNAL MEDICINE

## 2023-08-07 PROCEDURE — 94664 DEMO&/EVAL PT USE INHALER: CPT

## 2023-08-07 PROCEDURE — 81003 URINALYSIS AUTO W/O SCOPE: CPT

## 2023-08-07 PROCEDURE — 94640 AIRWAY INHALATION TREATMENT: CPT

## 2023-08-07 PROCEDURE — 83605 ASSAY OF LACTIC ACID: CPT

## 2023-08-07 PROCEDURE — 0202U NFCT DS 22 TRGT SARS-COV-2: CPT

## 2023-08-07 PROCEDURE — 87040 BLOOD CULTURE FOR BACTERIA: CPT

## 2023-08-07 PROCEDURE — 85025 COMPLETE CBC W/AUTO DIFF WBC: CPT

## 2023-08-07 PROCEDURE — 87899 AGENT NOS ASSAY W/OPTIC: CPT

## 2023-08-07 PROCEDURE — 71045 X-RAY EXAM CHEST 1 VIEW: CPT

## 2023-08-07 PROCEDURE — 87449 NOS EACH ORGANISM AG IA: CPT

## 2023-08-07 PROCEDURE — 84145 PROCALCITONIN (PCT): CPT

## 2023-08-07 PROCEDURE — 93010 ELECTROCARDIOGRAM REPORT: CPT | Performed by: INTERNAL MEDICINE

## 2023-08-07 PROCEDURE — 2580000003 HC RX 258: Performed by: INTERNAL MEDICINE

## 2023-08-07 PROCEDURE — 96368 THER/DIAG CONCURRENT INF: CPT

## 2023-08-07 PROCEDURE — 2700000000 HC OXYGEN THERAPY PER DAY

## 2023-08-07 PROCEDURE — 2580000003 HC RX 258: Performed by: EMERGENCY MEDICINE

## 2023-08-07 PROCEDURE — 6370000000 HC RX 637 (ALT 250 FOR IP): Performed by: EMERGENCY MEDICINE

## 2023-08-07 PROCEDURE — 87502 INFLUENZA DNA AMP PROBE: CPT

## 2023-08-07 PROCEDURE — 96365 THER/PROPH/DIAG IV INF INIT: CPT

## 2023-08-07 PROCEDURE — 2500000003 HC RX 250 WO HCPCS: Performed by: EMERGENCY MEDICINE

## 2023-08-07 PROCEDURE — 99285 EMERGENCY DEPT VISIT HI MDM: CPT

## 2023-08-07 RX ORDER — ARFORMOTEROL TARTRATE 15 UG/2ML
15 SOLUTION RESPIRATORY (INHALATION)
Status: DISCONTINUED | OUTPATIENT
Start: 2023-08-07 | End: 2023-08-10 | Stop reason: HOSPADM

## 2023-08-07 RX ORDER — OXYCODONE AND ACETAMINOPHEN 7.5; 325 MG/1; MG/1
1 TABLET ORAL 2 TIMES DAILY PRN
Status: DISCONTINUED | OUTPATIENT
Start: 2023-08-07 | End: 2023-08-07 | Stop reason: CLARIF

## 2023-08-07 RX ORDER — OXYCODONE HYDROCHLORIDE AND ACETAMINOPHEN 5; 325 MG/1; MG/1
1 TABLET ORAL 2 TIMES DAILY PRN
Status: DISCONTINUED | OUTPATIENT
Start: 2023-08-07 | End: 2023-08-10 | Stop reason: HOSPADM

## 2023-08-07 RX ORDER — POTASSIUM CHLORIDE 7.45 MG/ML
10 INJECTION INTRAVENOUS PRN
Status: DISCONTINUED | OUTPATIENT
Start: 2023-08-07 | End: 2023-08-10 | Stop reason: HOSPADM

## 2023-08-07 RX ORDER — DEXAMETHASONE SODIUM PHOSPHATE 10 MG/ML
10 INJECTION INTRAMUSCULAR; INTRAVENOUS ONCE
Status: COMPLETED | OUTPATIENT
Start: 2023-08-07 | End: 2023-08-07

## 2023-08-07 RX ORDER — MAGNESIUM SULFATE IN WATER 40 MG/ML
2000 INJECTION, SOLUTION INTRAVENOUS ONCE
Status: COMPLETED | OUTPATIENT
Start: 2023-08-07 | End: 2023-08-07

## 2023-08-07 RX ORDER — ONDANSETRON 2 MG/ML
4 INJECTION INTRAMUSCULAR; INTRAVENOUS EVERY 6 HOURS PRN
Status: DISCONTINUED | OUTPATIENT
Start: 2023-08-07 | End: 2023-08-10 | Stop reason: HOSPADM

## 2023-08-07 RX ORDER — ACETAMINOPHEN 325 MG/1
650 TABLET ORAL EVERY 4 HOURS PRN
Status: DISCONTINUED | OUTPATIENT
Start: 2023-08-07 | End: 2023-08-10 | Stop reason: HOSPADM

## 2023-08-07 RX ORDER — GUAIFENESIN 400 MG/1
400 TABLET ORAL 4 TIMES DAILY
Status: DISCONTINUED | OUTPATIENT
Start: 2023-08-07 | End: 2023-08-10 | Stop reason: HOSPADM

## 2023-08-07 RX ORDER — OXYCODONE HYDROCHLORIDE 5 MG/1
2.5 TABLET ORAL 2 TIMES DAILY PRN
Status: DISCONTINUED | OUTPATIENT
Start: 2023-08-07 | End: 2023-08-10 | Stop reason: HOSPADM

## 2023-08-07 RX ORDER — ENOXAPARIN SODIUM 100 MG/ML
40 INJECTION SUBCUTANEOUS DAILY
Status: DISCONTINUED | OUTPATIENT
Start: 2023-08-07 | End: 2023-08-10 | Stop reason: HOSPADM

## 2023-08-07 RX ORDER — IPRATROPIUM BROMIDE AND ALBUTEROL SULFATE 2.5; .5 MG/3ML; MG/3ML
3 SOLUTION RESPIRATORY (INHALATION) ONCE
Status: COMPLETED | OUTPATIENT
Start: 2023-08-07 | End: 2023-08-07

## 2023-08-07 RX ORDER — SODIUM CHLORIDE 0.9 % (FLUSH) 0.9 %
SYRINGE (ML) INJECTION
Status: COMPLETED
Start: 2023-08-07 | End: 2023-08-08

## 2023-08-07 RX ORDER — BUDESONIDE 0.5 MG/2ML
500 INHALANT ORAL 2 TIMES DAILY
Status: DISCONTINUED | OUTPATIENT
Start: 2023-08-07 | End: 2023-08-10 | Stop reason: HOSPADM

## 2023-08-07 RX ORDER — AZITHROMYCIN 250 MG/1
500 TABLET, FILM COATED ORAL
Status: DISCONTINUED | OUTPATIENT
Start: 2023-08-07 | End: 2023-08-10 | Stop reason: HOSPADM

## 2023-08-07 RX ORDER — DOCUSATE SODIUM 100 MG/1
100 CAPSULE, LIQUID FILLED ORAL 2 TIMES DAILY
Status: DISCONTINUED | OUTPATIENT
Start: 2023-08-07 | End: 2023-08-10 | Stop reason: HOSPADM

## 2023-08-07 RX ORDER — PANTOPRAZOLE SODIUM 40 MG/1
40 TABLET, DELAYED RELEASE ORAL
Status: DISCONTINUED | OUTPATIENT
Start: 2023-08-08 | End: 2023-08-10 | Stop reason: HOSPADM

## 2023-08-07 RX ORDER — BUDESONIDE 0.5 MG/2ML
1 INHALANT ORAL 2 TIMES DAILY
COMMUNITY

## 2023-08-07 RX ORDER — SODIUM CHLORIDE 9 MG/ML
INJECTION, SOLUTION INTRAVENOUS CONTINUOUS
Status: DISCONTINUED | OUTPATIENT
Start: 2023-08-07 | End: 2023-08-09

## 2023-08-07 RX ORDER — POTASSIUM CHLORIDE 20 MEQ/1
40 TABLET, EXTENDED RELEASE ORAL PRN
Status: DISCONTINUED | OUTPATIENT
Start: 2023-08-07 | End: 2023-08-10 | Stop reason: HOSPADM

## 2023-08-07 RX ORDER — IPRATROPIUM BROMIDE AND ALBUTEROL SULFATE 2.5; .5 MG/3ML; MG/3ML
1 SOLUTION RESPIRATORY (INHALATION) EVERY 4 HOURS
Status: DISCONTINUED | OUTPATIENT
Start: 2023-08-07 | End: 2023-08-10 | Stop reason: HOSPADM

## 2023-08-07 RX ORDER — AZITHROMYCIN 250 MG/1
500 TABLET, FILM COATED ORAL
COMMUNITY

## 2023-08-07 RX ORDER — METHYLPREDNISOLONE SODIUM SUCCINATE 40 MG/ML
40 INJECTION, POWDER, LYOPHILIZED, FOR SOLUTION INTRAMUSCULAR; INTRAVENOUS EVERY 8 HOURS
Status: DISCONTINUED | OUTPATIENT
Start: 2023-08-07 | End: 2023-08-09

## 2023-08-07 RX ADMIN — SODIUM CHLORIDE 2397 ML: 9 INJECTION, SOLUTION INTRAVENOUS at 16:06

## 2023-08-07 RX ADMIN — ENOXAPARIN SODIUM 40 MG: 100 INJECTION SUBCUTANEOUS at 21:51

## 2023-08-07 RX ADMIN — WATER 2000 MG: 1 INJECTION INTRAMUSCULAR; INTRAVENOUS; SUBCUTANEOUS at 16:05

## 2023-08-07 RX ADMIN — SODIUM CHLORIDE: 9 INJECTION, SOLUTION INTRAVENOUS at 20:51

## 2023-08-07 RX ADMIN — BUDESONIDE INHALATION 500 MCG: 0.5 SUSPENSION RESPIRATORY (INHALATION) at 20:57

## 2023-08-07 RX ADMIN — IPRATROPIUM BROMIDE AND ALBUTEROL SULFATE 3 DOSE: .5; 2.5 SOLUTION RESPIRATORY (INHALATION) at 15:00

## 2023-08-07 RX ADMIN — ARFORMOTEROL TARTRATE 15 MCG: 15 SOLUTION RESPIRATORY (INHALATION) at 20:57

## 2023-08-07 RX ADMIN — IPRATROPIUM BROMIDE AND ALBUTEROL SULFATE 1 DOSE: 2.5; .5 SOLUTION RESPIRATORY (INHALATION) at 23:39

## 2023-08-07 RX ADMIN — GUAIFENESIN 400 MG: 400 TABLET ORAL at 21:52

## 2023-08-07 RX ADMIN — IPRATROPIUM BROMIDE AND ALBUTEROL SULFATE 1 DOSE: 2.5; .5 SOLUTION RESPIRATORY (INHALATION) at 20:57

## 2023-08-07 RX ADMIN — MAGNESIUM SULFATE HEPTAHYDRATE 2000 MG: 40 INJECTION, SOLUTION INTRAVENOUS at 16:01

## 2023-08-07 RX ADMIN — DOXYCYCLINE 100 MG: 100 INJECTION, POWDER, LYOPHILIZED, FOR SOLUTION INTRAVENOUS at 16:15

## 2023-08-07 RX ADMIN — METHYLPREDNISOLONE SODIUM SUCCINATE 40 MG: 40 INJECTION INTRAMUSCULAR; INTRAVENOUS at 21:52

## 2023-08-07 RX ADMIN — DEXAMETHASONE SODIUM PHOSPHATE 10 MG: 10 INJECTION INTRAMUSCULAR; INTRAVENOUS at 18:15

## 2023-08-07 ASSESSMENT — PAIN DESCRIPTION - LOCATION: LOCATION: BACK

## 2023-08-07 ASSESSMENT — PAIN SCALES - GENERAL
PAINLEVEL_OUTOF10: 1
PAINLEVEL_OUTOF10: 0

## 2023-08-07 NOTE — PROGRESS NOTES
Database initiated pharmacy and medications verified with the patient. He is A&O independent from home with wife. He wears 4-6 liters oxygen through [facility].

## 2023-08-08 ENCOUNTER — APPOINTMENT (OUTPATIENT)
Dept: CT IMAGING | Age: 73
End: 2023-08-08
Payer: MEDICARE

## 2023-08-08 LAB
ALBUMIN SERPL-MCNC: 3.6 G/DL (ref 3.5–5.2)
ALP SERPL-CCNC: 55 U/L (ref 40–129)
ALT SERPL-CCNC: 8 U/L (ref 0–40)
ANION GAP SERPL CALCULATED.3IONS-SCNC: 10 MMOL/L (ref 7–16)
AST SERPL-CCNC: 11 U/L (ref 0–39)
B PARAP IS1001 DNA NPH QL NAA+NON-PROBE: NOT DETECTED
B PERT DNA SPEC QL NAA+PROBE: NOT DETECTED
BASOPHILS # BLD: 0 K/UL (ref 0–0.2)
BASOPHILS NFR BLD: 0 % (ref 0–2)
BILIRUB DIRECT SERPL-MCNC: <0.2 MG/DL (ref 0–0.3)
BILIRUB INDIRECT SERPL-MCNC: ABNORMAL MG/DL (ref 0–1)
BILIRUB SERPL-MCNC: 0.4 MG/DL (ref 0–1.2)
BNP SERPL-MCNC: 1146 PG/ML (ref 0–125)
BUN SERPL-MCNC: 6 MG/DL (ref 6–23)
C PNEUM DNA NPH QL NAA+NON-PROBE: NOT DETECTED
CA-I BLD-SCNC: 1.21 MMOL/L (ref 1.15–1.33)
CALCIUM SERPL-MCNC: 9 MG/DL (ref 8.6–10.2)
CHLORIDE SERPL-SCNC: 106 MMOL/L (ref 98–107)
CHOLEST SERPL-MCNC: 119 MG/DL
CO2 SERPL-SCNC: 21 MMOL/L (ref 22–29)
CREAT SERPL-MCNC: 0.6 MG/DL (ref 0.7–1.2)
CRP SERPL HS-MCNC: 14 MG/L (ref 0–5)
EOSINOPHIL # BLD: 0 K/UL (ref 0.05–0.5)
EOSINOPHILS RELATIVE PERCENT: 0 % (ref 0–6)
ERYTHROCYTE [DISTWIDTH] IN BLOOD BY AUTOMATED COUNT: 15.5 % (ref 11.5–15)
ERYTHROCYTE [SEDIMENTATION RATE] IN BLOOD BY WESTERGREN METHOD: 3 MM/HR (ref 0–15)
FERRITIN SERPL-MCNC: 68 NG/ML
FLUAV RNA NPH QL NAA+NON-PROBE: NOT DETECTED
FLUBV RNA NPH QL NAA+NON-PROBE: NOT DETECTED
FOLATE SERPL-MCNC: 12.7 NG/ML (ref 4.8–24.2)
GFR SERPL CREATININE-BSD FRML MDRD: >60 ML/MIN/1.73M2
GLUCOSE SERPL-MCNC: 196 MG/DL (ref 74–99)
HADV DNA NPH QL NAA+NON-PROBE: NOT DETECTED
HBA1C MFR BLD: 5.4 % (ref 4–5.6)
HCOV 229E RNA NPH QL NAA+NON-PROBE: NOT DETECTED
HCOV HKU1 RNA NPH QL NAA+NON-PROBE: NOT DETECTED
HCOV NL63 RNA NPH QL NAA+NON-PROBE: NOT DETECTED
HCOV OC43 RNA NPH QL NAA+NON-PROBE: NOT DETECTED
HCT VFR BLD AUTO: 43.7 % (ref 37–54)
HDLC SERPL-MCNC: 64 MG/DL
HGB BLD-MCNC: 14.6 G/DL (ref 12.5–16.5)
HMPV RNA NPH QL NAA+NON-PROBE: NOT DETECTED
HPIV1 RNA NPH QL NAA+NON-PROBE: NOT DETECTED
HPIV2 RNA NPH QL NAA+NON-PROBE: NOT DETECTED
HPIV3 RNA NPH QL NAA+NON-PROBE: NOT DETECTED
HPIV4 RNA NPH QL NAA+NON-PROBE: NOT DETECTED
IMM GRANULOCYTES # BLD AUTO: <0.03 K/UL (ref 0–0.58)
IMM GRANULOCYTES NFR BLD: 1 % (ref 0–5)
IRON SATN MFR SERPL: 12 % (ref 20–55)
IRON SERPL-MCNC: 34 UG/DL (ref 59–158)
L PNEUMO1 AG UR QL IA.RAPID: NEGATIVE
LACTATE BLDV-SCNC: 2 MMOL/L (ref 0.5–1.9)
LDLC SERPL CALC-MCNC: 47 MG/DL
LYMPHOCYTES NFR BLD: 0.16 K/UL (ref 1.5–4)
LYMPHOCYTES RELATIVE PERCENT: 4 % (ref 20–42)
M PNEUMO DNA NPH QL NAA+NON-PROBE: NOT DETECTED
MAGNESIUM SERPL-MCNC: 2 MG/DL (ref 1.6–2.6)
MCH RBC QN AUTO: 30.5 PG (ref 26–35)
MCHC RBC AUTO-ENTMCNC: 33.4 G/DL (ref 32–34.5)
MCV RBC AUTO: 91.4 FL (ref 80–99.9)
MONOCYTES NFR BLD: 0.02 K/UL (ref 0.1–0.95)
MONOCYTES NFR BLD: 1 % (ref 2–12)
NEUTROPHILS NFR BLD: 95 % (ref 43–80)
NEUTS SEG NFR BLD: 4.11 K/UL (ref 1.8–7.3)
PHOSPHATE SERPL-MCNC: 2.5 MG/DL (ref 2.5–4.5)
PLATELET # BLD AUTO: 153 K/UL (ref 130–450)
PMV BLD AUTO: 10.1 FL (ref 7–12)
POTASSIUM SERPL-SCNC: 3.7 MMOL/L (ref 3.5–5)
PROCALCITONIN SERPL-MCNC: 0.06 NG/ML (ref 0–0.08)
PROT SERPL-MCNC: 5.6 G/DL (ref 6.4–8.3)
RBC # BLD AUTO: 4.78 M/UL (ref 3.8–5.8)
RBC # BLD: NORMAL 10*6/UL
RSV RNA NPH QL NAA+NON-PROBE: NOT DETECTED
RV+EV RNA NPH QL NAA+NON-PROBE: NOT DETECTED
S PNEUM AG SPEC QL: NEGATIVE
SARS-COV-2 RNA NPH QL NAA+NON-PROBE: NOT DETECTED
SODIUM SERPL-SCNC: 137 MMOL/L (ref 132–146)
SPECIMEN DESCRIPTION: NORMAL
SPECIMEN SOURCE: NORMAL
T4 FREE SERPL-MCNC: 1.1 NG/DL (ref 0.9–1.7)
TIBC SERPL-MCNC: 287 UG/DL (ref 250–450)
TRIGL SERPL-MCNC: 42 MG/DL
TSH SERPL DL<=0.05 MIU/L-ACNC: 0.36 UIU/ML (ref 0.27–4.2)
URATE SERPL-MCNC: 5.7 MG/DL (ref 3.4–7)
VIT B12 SERPL-MCNC: 310 PG/ML (ref 211–946)
VLDLC SERPL CALC-MCNC: 8 MG/DL
WBC OTHER # BLD: 4.3 K/UL (ref 4.5–11.5)

## 2023-08-08 PROCEDURE — 84443 ASSAY THYROID STIM HORMONE: CPT

## 2023-08-08 PROCEDURE — 83880 ASSAY OF NATRIURETIC PEPTIDE: CPT

## 2023-08-08 PROCEDURE — 6370000000 HC RX 637 (ALT 250 FOR IP): Performed by: INTERNAL MEDICINE

## 2023-08-08 PROCEDURE — 84145 PROCALCITONIN (PCT): CPT

## 2023-08-08 PROCEDURE — 6360000002 HC RX W HCPCS: Performed by: INTERNAL MEDICINE

## 2023-08-08 PROCEDURE — 82330 ASSAY OF CALCIUM: CPT

## 2023-08-08 PROCEDURE — 85652 RBC SED RATE AUTOMATED: CPT

## 2023-08-08 PROCEDURE — 84550 ASSAY OF BLOOD/URIC ACID: CPT

## 2023-08-08 PROCEDURE — 83036 HEMOGLOBIN GLYCOSYLATED A1C: CPT

## 2023-08-08 PROCEDURE — 97161 PT EVAL LOW COMPLEX 20 MIN: CPT

## 2023-08-08 PROCEDURE — 82248 BILIRUBIN DIRECT: CPT

## 2023-08-08 PROCEDURE — 83735 ASSAY OF MAGNESIUM: CPT

## 2023-08-08 PROCEDURE — 71250 CT THORAX DX C-: CPT

## 2023-08-08 PROCEDURE — 2700000000 HC OXYGEN THERAPY PER DAY

## 2023-08-08 PROCEDURE — 94640 AIRWAY INHALATION TREATMENT: CPT

## 2023-08-08 PROCEDURE — 84100 ASSAY OF PHOSPHORUS: CPT

## 2023-08-08 PROCEDURE — 82607 VITAMIN B-12: CPT

## 2023-08-08 PROCEDURE — 84439 ASSAY OF FREE THYROXINE: CPT

## 2023-08-08 PROCEDURE — 82746 ASSAY OF FOLIC ACID SERUM: CPT

## 2023-08-08 PROCEDURE — 2580000003 HC RX 258: Performed by: INTERNAL MEDICINE

## 2023-08-08 PROCEDURE — 2580000003 HC RX 258

## 2023-08-08 PROCEDURE — 80061 LIPID PANEL: CPT

## 2023-08-08 PROCEDURE — 85025 COMPLETE CBC W/AUTO DIFF WBC: CPT

## 2023-08-08 PROCEDURE — 83605 ASSAY OF LACTIC ACID: CPT

## 2023-08-08 PROCEDURE — 2500000003 HC RX 250 WO HCPCS: Performed by: INTERNAL MEDICINE

## 2023-08-08 PROCEDURE — 80053 COMPREHEN METABOLIC PANEL: CPT

## 2023-08-08 PROCEDURE — 82728 ASSAY OF FERRITIN: CPT

## 2023-08-08 PROCEDURE — 1200000000 HC SEMI PRIVATE

## 2023-08-08 PROCEDURE — 83550 IRON BINDING TEST: CPT

## 2023-08-08 PROCEDURE — 83540 ASSAY OF IRON: CPT

## 2023-08-08 PROCEDURE — 86140 C-REACTIVE PROTEIN: CPT

## 2023-08-08 RX ORDER — BUMETANIDE 0.25 MG/ML
1 INJECTION INTRAMUSCULAR; INTRAVENOUS EVERY 12 HOURS
Status: DISCONTINUED | OUTPATIENT
Start: 2023-08-08 | End: 2023-08-10 | Stop reason: HOSPADM

## 2023-08-08 RX ORDER — CODEINE PHOSPHATE AND GUAIFENESIN 10; 100 MG/5ML; MG/5ML
5 SOLUTION ORAL EVERY 4 HOURS PRN
Status: DISCONTINUED | OUTPATIENT
Start: 2023-08-08 | End: 2023-08-10 | Stop reason: HOSPADM

## 2023-08-08 RX ORDER — BENZONATATE 100 MG/1
200 CAPSULE ORAL 3 TIMES DAILY PRN
Status: DISCONTINUED | OUTPATIENT
Start: 2023-08-08 | End: 2023-08-09

## 2023-08-08 RX ADMIN — BUMETANIDE 1 MG: 0.25 INJECTION INTRAMUSCULAR; INTRAVENOUS at 21:17

## 2023-08-08 RX ADMIN — GUAIFENESIN 400 MG: 400 TABLET ORAL at 09:02

## 2023-08-08 RX ADMIN — IPRATROPIUM BROMIDE AND ALBUTEROL SULFATE 1 DOSE: 2.5; .5 SOLUTION RESPIRATORY (INHALATION) at 12:34

## 2023-08-08 RX ADMIN — BUDESONIDE INHALATION 500 MCG: 0.5 SUSPENSION RESPIRATORY (INHALATION) at 08:25

## 2023-08-08 RX ADMIN — PANTOPRAZOLE SODIUM 40 MG: 40 TABLET, DELAYED RELEASE ORAL at 06:00

## 2023-08-08 RX ADMIN — IPRATROPIUM BROMIDE AND ALBUTEROL SULFATE 1 DOSE: 2.5; .5 SOLUTION RESPIRATORY (INHALATION) at 23:41

## 2023-08-08 RX ADMIN — DOXYCYCLINE 100 MG: 100 INJECTION, POWDER, LYOPHILIZED, FOR SOLUTION INTRAVENOUS at 21:24

## 2023-08-08 RX ADMIN — DOXYCYCLINE 100 MG: 100 INJECTION, POWDER, LYOPHILIZED, FOR SOLUTION INTRAVENOUS at 11:35

## 2023-08-08 RX ADMIN — METHYLPREDNISOLONE SODIUM SUCCINATE 40 MG: 40 INJECTION INTRAMUSCULAR; INTRAVENOUS at 15:24

## 2023-08-08 RX ADMIN — IPRATROPIUM BROMIDE AND ALBUTEROL SULFATE 1 DOSE: 2.5; .5 SOLUTION RESPIRATORY (INHALATION) at 20:44

## 2023-08-08 RX ADMIN — ARFORMOTEROL TARTRATE 15 MCG: 15 SOLUTION RESPIRATORY (INHALATION) at 08:25

## 2023-08-08 RX ADMIN — SODIUM CHLORIDE: 9 INJECTION, SOLUTION INTRAVENOUS at 21:25

## 2023-08-08 RX ADMIN — WATER 1000 MG: 1 INJECTION INTRAMUSCULAR; INTRAVENOUS; SUBCUTANEOUS at 15:24

## 2023-08-08 RX ADMIN — ARFORMOTEROL TARTRATE 15 MCG: 15 SOLUTION RESPIRATORY (INHALATION) at 20:44

## 2023-08-08 RX ADMIN — METHYLPREDNISOLONE SODIUM SUCCINATE 40 MG: 40 INJECTION INTRAMUSCULAR; INTRAVENOUS at 21:10

## 2023-08-08 RX ADMIN — SODIUM CHLORIDE, PRESERVATIVE FREE 10 ML: 5 INJECTION INTRAVENOUS at 04:25

## 2023-08-08 RX ADMIN — BUMETANIDE 1 MG: 0.25 INJECTION INTRAMUSCULAR; INTRAVENOUS at 11:32

## 2023-08-08 RX ADMIN — IPRATROPIUM BROMIDE AND ALBUTEROL SULFATE 1 DOSE: 2.5; .5 SOLUTION RESPIRATORY (INHALATION) at 03:12

## 2023-08-08 RX ADMIN — DOCUSATE SODIUM 100 MG: 100 CAPSULE, LIQUID FILLED ORAL at 09:02

## 2023-08-08 RX ADMIN — METHYLPREDNISOLONE SODIUM SUCCINATE 40 MG: 40 INJECTION INTRAMUSCULAR; INTRAVENOUS at 04:25

## 2023-08-08 RX ADMIN — BUDESONIDE INHALATION 500 MCG: 0.5 SUSPENSION RESPIRATORY (INHALATION) at 20:44

## 2023-08-08 RX ADMIN — IPRATROPIUM BROMIDE AND ALBUTEROL SULFATE 1 DOSE: 2.5; .5 SOLUTION RESPIRATORY (INHALATION) at 08:25

## 2023-08-08 RX ADMIN — SODIUM CHLORIDE: 9 INJECTION, SOLUTION INTRAVENOUS at 05:13

## 2023-08-08 RX ADMIN — IPRATROPIUM BROMIDE AND ALBUTEROL SULFATE 1 DOSE: 2.5; .5 SOLUTION RESPIRATORY (INHALATION) at 16:38

## 2023-08-08 RX ADMIN — ENOXAPARIN SODIUM 40 MG: 100 INJECTION SUBCUTANEOUS at 09:02

## 2023-08-08 RX ADMIN — DOCUSATE SODIUM 100 MG: 100 CAPSULE, LIQUID FILLED ORAL at 21:16

## 2023-08-08 RX ADMIN — GUAIFENESIN 400 MG: 400 TABLET ORAL at 15:24

## 2023-08-08 RX ADMIN — GUAIFENESIN 400 MG: 400 TABLET ORAL at 21:16

## 2023-08-08 ASSESSMENT — PAIN SCALES - GENERAL: PAINLEVEL_OUTOF10: 0

## 2023-08-08 NOTE — H&P
History and Physical      CHIEF COMPLAINT: Short of breath, chest pain        History of Present Illness: 72-year-old male patient of Dr. Yamila Leiva I am asked to admit and follow. History obtained from patient as well as extensive review electronic record. Patient has known COPD on chronic oxygen at home. 4 days prior to admission he noticed increasing dyspnea despite using oxygen 3-6 L/min. Upon arrival to the ED SpO2 was reported to be 75% on home oxygen. He was able to drive himself and wife to the ED. He is also been having chills for the last 4 days; some discoloration of sputum. He was given azithromycin by primary care for further symptoms a few weeks ago which he completed without improvement. Patient is known to have Mycobacterium avium complex and is on chronic azithromycin 3 days a week. --In the ED temperature 98.4 respirations 22 SpO2 90 on 6 L nasal cannula. Currently on 8 L nasal cannula high flow for SpO2 of 93.  -- Chest x-ray done in the ED with following results--    FINDINGS:   Slight interval increase in mild bibasilar opacity. There is redemonstration   of wanting of the right costophrenic angle. No pneumothorax. The heart is   not enlarged. Impression:       Minimal interval increase in bibasilar opacity, of questionable clinical   significance. Continued blunting of the right costophrenic angle, may be due to combination   of pleural effusion/pleural reaction. -- Labs from the ED revealed potassium 3.7 random glucose of 90. Lactic acid 1.9 BUN 8 creatinine 0.8. Procalcitonin 0.04. Liver functions are normal.  WBC 7.0 hemoglobin 17.2. Blood cultures were obtained, no growth less than 24 hours. SARS-CoV-2 as well as influenza A and influenza B all not detected in the ED. Lactic acid in the ED was barely normal 1.9.  -- Today's labs potassium 3.7 ionized calcium 1.21 magnesium 2.0 lactic acid elevated 2.0 fasting glucose 196.   Phosphorus 2.5 procalcitonin

## 2023-08-08 NOTE — DISCHARGE INSTRUCTIONS
HEART FAILURE  / CONGESTIVE HEART FAILURE  DISCHARGE INSTRUCTIONS:  GUIDELINES TO FOLLOW AT HOME    Self- Managed Care:     MEDICATIONS:  Take your medication as directed. If you are experiencing any side effects, inform your doctor, Do not stop taking any of your medications without letting your doctor know. Check with your doctor before taking any over-the-counter medications / herbal / or dietary supplements. They may interfere with your other medications. Do not take ibuprofen (Advil or Motrin) and naproxen (Aleve) without talking to your doctor first. They could make your heart failure worse. WEIGHT MONITORING:   Weigh yourself everyday (with the same scale) around the same time of the day and write it down. (you can chart them on a calendar or keep track of them on paper. Notify your doctor of a weight gain of 3 pounds or more in 1 day   OR a total of 5 pounds or more in 1 week    Take your weight record to your doctor visits  Also, the same goes if you loose more than 3# in one day, let your heart doctor know. DIET:   Cardiac heart healthy diet- Low saturated / low trans fat, no added salt, caffeine restricted, Low sodium diet-   No more than 2,000mg (2 grams) of salt / sodium per day (which equals to a little less than  a teaspoon of salt)  If your doctor wants you on a fluid restriction. ..it is usually recommended a fluid limit of 2,000cc -  Fluid restriction- 2,000 ml (milliliters) = 64 ounces = you can have 8 glasses of fluid per day (each glass 8 ounces)    Follow a low salt diet - avoid using salt at the table, avoid / limit use of canned soups, processed / packaged foods, salted snacks, olives and pickles. Do not use a salt substitute without checking with your doctor, they may contain a high amount of potassioum. (Mrs. Wale Ruano is safe to use).     Limit the use of alcohol       CALL YOUR DOCTOR THE FIRST DAY YOU NOTICE ANY OF THESE   SYMPTOMS:  You have a

## 2023-08-08 NOTE — PROGRESS NOTES
Physical Therapy  Facility/Department: [address] MED SURG/TELE  Physical Therapy Initial Assessment    Name: Leesa Deng  : 1950  MRN: [id number]  Date of Service: 2023    Attending Provider:  Aj Sherman DO    Evaluating PT:  Yasmin Madison. Sabina Borges P.T. Room #:  [facility]/1774-  Diagnosis:  Acute exacerbation of chronic obstructive pulmonary disease (COPD) ([facility]) [J44.1]  COPD exacerbation (HCC) [J44.1]  Precautions:  O2 sat drops with activity, droplet+ isolation for COVID r/o     SUBJECTIVE:    Pt lives with his wife in a 1 story home with 2 stairs and no rail to enter. Pt ambulated with no AD PTA. He uses 4-6L home O2    OBJECTIVE:   Initial Evaluation  Date: 23   Was pt agreeable to Eval/treatment? yes   Does pt have pain? No c/o pain   Bed Mobility  Rolling: Independent  Supine to sit: Independent  Sit to supine: Independent  Scooting: Independent   Transfers Sit to stand: Independent  Stand to sit: Independent  Stand pivot: Independent   Ambulation   75 feet with no AD Independent    Stair negotiation: ascended and descended NA, pt was in droplet + iso   AM-PAC 6 Clicks 96/44     BLE ROM is WFL. BLE strength is grossly 4+/5. Sensation:  Pt denies numbness and tingling to extremities  Balance: sitting is Independent and standing with no AD is Independent   Endurance: fair    Vitals:  Pt was on 8L O2 and breathing was at baseline to begin eval.  He walked in the room and had no SOB and was able to hold conversation. He got back into bed and became SOB. He O2 sat was 75% and after 3 min of deep breathing with NC his breathing returned to baseline and O2 sat improved to 89%. ASSESSMENT:    Comments:  Pt is Independent with functional mobility at this time, but did have a drop in O2 sat as noted above. Pt's respiratory function is limiting amb distance at this time. Pt has no skilled PT needs at this time and will be discharged from our service.       Pt was left supine in bed per his request with HOB elevated and breakfast tray in front of pt with call light left by patient. Chair/bed alarm: bed alarm was re-activated. Pt's/ family goals   1. To breathe better and go home. Patient and or family understand(s) diagnosis, prognosis, and plan of care. PHYSICAL THERAPY PLAN OF CARE:    PT will discharge pt from our service at this time. Referring provider/PT Order:  PT eval and treat  Diagnosis:  Acute exacerbation of chronic obstructive pulmonary disease (COPD) ([address] ) [J44.1]  COPD exacerbation ([address] ) [J44.1]    Time in  07:30  Time out  07:45    Evaluation Time includes thorough review of current medical information, gathering information on past medical history/social history and prior level of function, completion of standardized testing/informal observation of tasks, assessment of data and education on plan of care and goals. CPT codes:  [x] Low Complexity PT evaluation 26389  [] Moderate Complexity PT evaluation 97850  [] High Complexity PT evaluation 76906  [] PT Re-evaluation 52283  [] Gait training 22462 ** minutes  [] Manual therapy 04863 ** minutes  [] Therapeutic activities 90186 ** minutes  [] Therapeutic exercises 27320 ** minutes  [] Neuromuscular reeducation 54380 ** minutes     Mayur Liu., P.T.   License Number: [id number]

## 2023-08-08 NOTE — PLAN OF CARE
Problem: Discharge Planning  Goal: Discharge to home or other facility with appropriate resources  8/8/2023 0949 by Nitin Robison RN  Outcome: Progressing  8/7/2023 2339 by Hany Medel RN  Outcome: Progressing     Problem: Pain  Goal: Verbalizes/displays adequate comfort level or baseline comfort level  8/8/2023 0949 by Nitin Robison RN  Outcome: Progressing  8/7/2023 2339 by Hany Medel RN  Outcome: Progressing     Problem: Safety - Adult  Goal: Free from fall injury  8/7/2023 2339 by Hany Medel RN  Outcome: Progressing

## 2023-08-08 NOTE — CARE COORDINATION
8/8/2023  Social Work Discharge Planning: COPD. Pulm saw- CAT scan today. This worker met with Pt to discuss  role and transition of care/discharge planning. Pt is independent from home with no DME. Pt is on 8l o2 here and uses 4-4.5 at home via portable concentrator from [facility] [place] [facility]. Spouse will transport at discharge.  Electronically signed by MALVIN Grier on 8/8/2023 at 11:14 AM

## 2023-08-08 NOTE — CONSULTS
Pulmonary Consultation    Admit Date: 8/7/2023  Requesting Physician: Al Kapoor MD      Reason for consultation:  Acute exacerbation of COPD      HPI:  Patient is a 72year old male who presents to the emergency room with shortness of breath, chest pain, and hypoxia. He states a week ago he was at his primary care doctor office and completed a medrol dose pack with little relief. He admits that this had progressively worsened over the last three weeks. He was placed on 8 L high flow oxygen in the emergency room. He was found to have increased bibasilar opacities on chest imaging with elevated Pro-BNP. He was started on intravenous diuretics. His inflammatory markers are low. Patient was started on intravenous antibiotics, intravenous steroids, and aerosols. Patient does have a history of COPD known to both inpatient and outpatient setting. 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 He does use supplemental oxygen at home and takes his maintenance medication at home. Patient is seen on 8 L high flow nasal cannula. He does have occasional productive cough with yellow sputum. The chest pain has improved.          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 No nasal congestion, or sore throat. Cardiovascular: (+) chest pain, (+) dyspnea on exertion, or palpitations. Respiratory: See above  Gastrointestinal: No abdominal pain, nausea or emesis. No diarrhea or rectal bleeding or melena. No change in bowel habits. Genitourinary: No dysuria, urinary frequency, or incontinence. No hematuria. Musculoskeletal: No gait disturbance, weakness or joint complaints. Integumentary: No rash or pruritis. No abnormal pigmentation, hair or nail changes. Neurological: No headache, diplopia, dizziness, tremor, change in muscle strength, numbness or tingling. No change in gait, balance, coordination, mood, affect, memory, mentation, behavior. Psychiatric: No anxiety or depression. Endocrine: No temperature intolerance, excessive thirst, fluid intake, urinary frequency, excessive appetite, or recent weight change. Hematologic/Lymphatic: No abnormal bruising or bleeding, blood clots or swollen lymph nodes. No anemia, fever, chills, night sweats, or swollen glands. Allergic/Immunologic: No seasonal or perenial allergies. No history of hives or atopic dermatitis.     Social History:  Alcohol:  denies  Tobacco:   92 pack years  Employment:  no silica or asbestos exposure  Family:  No family history of lung disease    Medications:   sodium chloride 100 mL/hr at 08/08/23 0513      cefTRIAXone (ROCEPHIN) IV  1,000 mg IntraVENous Q24H    doxycycline (VIBRAMYCIN) IV  100 mg IntraVENous Q12H    bumetanide  1 mg IntraVENous Q12H    azithromycin  500 mg Oral Once per day on Mon Wed Fri    budesonide  500 mcg Nebulization BID    guaiFENesin  400 mg Oral 4x Daily    ipratropium 0.5 mg-albuterol 2.5 mg  1 Dose Inhalation Q4H    arformoterol tartrate  15 mcg Nebulization BID RT    methylPREDNISolone  40 mg IntraVENous Q8H    docusate sodium  100 mg Oral BID    enoxaparin  40 mg SubCUTAneous Daily    pantoprazole  40 mg Oral QAM AC       Vitals:  Tmax:  VITALS:  /69

## 2023-08-08 NOTE — PLAN OF CARE
Patient's chart updated to reflect:      . - HF care plan, HF education points and HF discharge instructions.  -Orders: 2 gram sodium diet, daily weights, I/O.  -PCP and/or Cardiologist appointment to be scheduled within 7 days of hospital discharge.  -History of HF, not primary admission Dx. Patient admitted for treatment of   1.  COPD exacerbation ([address] )      Jordana Mcallister RN BSN  Heart Failure Navigator

## 2023-08-08 NOTE — PROGRESS NOTES
4 Eyes Skin Assessment     NAME:  Janene Sandoval  YOB: 1950  MEDICAL RECORD NUMBER:  [id number]    The patient is being assessed for  Admission    I agree that at least one RN has performed a thorough Head to Toe Skin Assessment on the patient. ALL assessment sites listed below have been assessed. Areas assessed by both nurses:    Head, Face, Ears, Shoulders, Back, Chest, Arms, Elbows, Hands, Sacrum. Buttock, Coccyx, Ischium, Legs. Feet and Heels, and Under Medical Devices         Does the Patient have a Wound?  No noted wound(s)       Sandro Prevention initiated by RN: No  Wound Care Orders initiated by RN: No    Pressure Injury (Stage 3,4, Unstageable, DTI, NWPT, and Complex wounds) if present, place Wound referral order by RN under : No    New Ostomies, if present place, Ostomy referral order under : No     Nurse 1 eSignature: Electronically signed by Arabella Lawler RN on 8/7/23 at 11:41 PM EDT    **SHARE this note so that the co-signing nurse can place an eSignature**    Nurse 2 eSignature: Electronically signed by Gabriele Mcgovern RN on 8/8/23 at 12:07 AM EDT

## 2023-08-09 PROBLEM — I27.20 PULMONARY HYPERTENSION (HCC): Status: ACTIVE | Noted: 2023-08-09

## 2023-08-09 LAB
ALBUMIN SERPL-MCNC: 3.7 G/DL (ref 3.5–5.2)
ALP SERPL-CCNC: 64 U/L (ref 40–129)
ALT SERPL-CCNC: 7 U/L (ref 0–40)
ANION GAP SERPL CALCULATED.3IONS-SCNC: 13 MMOL/L (ref 7–16)
AST SERPL-CCNC: 12 U/L (ref 0–39)
BASOPHILS # BLD: 0 K/UL (ref 0–0.2)
BASOPHILS NFR BLD: 0 % (ref 0–2)
BILIRUB DIRECT SERPL-MCNC: <0.2 MG/DL (ref 0–0.3)
BILIRUB INDIRECT SERPL-MCNC: ABNORMAL MG/DL (ref 0–1)
BILIRUB SERPL-MCNC: 0.3 MG/DL (ref 0–1.2)
BUN SERPL-MCNC: 9 MG/DL (ref 6–23)
CALCIUM SERPL-MCNC: 9.5 MG/DL (ref 8.6–10.2)
CHLORIDE SERPL-SCNC: 102 MMOL/L (ref 98–107)
CO2 SERPL-SCNC: 24 MMOL/L (ref 22–29)
CREAT SERPL-MCNC: 0.6 MG/DL (ref 0.7–1.2)
CRP SERPL HS-MCNC: 8 MG/L (ref 0–5)
EOSINOPHIL # BLD: 0 K/UL (ref 0.05–0.5)
EOSINOPHILS RELATIVE PERCENT: 0 % (ref 0–6)
ERYTHROCYTE [DISTWIDTH] IN BLOOD BY AUTOMATED COUNT: 15.8 % (ref 11.5–15)
ERYTHROCYTE [SEDIMENTATION RATE] IN BLOOD BY WESTERGREN METHOD: 0 MM/HR (ref 0–15)
GFR SERPL CREATININE-BSD FRML MDRD: >60 ML/MIN/1.73M2
GLUCOSE SERPL-MCNC: 163 MG/DL (ref 74–99)
HCT VFR BLD AUTO: 45.5 % (ref 37–54)
HGB BLD-MCNC: 14.9 G/DL (ref 12.5–16.5)
IMM GRANULOCYTES # BLD AUTO: 0.04 K/UL (ref 0–0.58)
IMM GRANULOCYTES NFR BLD: 0 % (ref 0–5)
LACTATE BLDV-SCNC: 2.8 MMOL/L (ref 0.5–1.9)
LYMPHOCYTES NFR BLD: 0.22 K/UL (ref 1.5–4)
LYMPHOCYTES RELATIVE PERCENT: 2 % (ref 20–42)
MAGNESIUM SERPL-MCNC: 1.9 MG/DL (ref 1.6–2.6)
MCH RBC QN AUTO: 30.3 PG (ref 26–35)
MCHC RBC AUTO-ENTMCNC: 32.7 G/DL (ref 32–34.5)
MCV RBC AUTO: 92.7 FL (ref 80–99.9)
MONOCYTES NFR BLD: 0.29 K/UL (ref 0.1–0.95)
MONOCYTES NFR BLD: 3 % (ref 2–12)
NEUTROPHILS NFR BLD: 94 % (ref 43–80)
NEUTS SEG NFR BLD: 8.68 K/UL (ref 1.8–7.3)
PHOSPHATE SERPL-MCNC: 2.6 MG/DL (ref 2.5–4.5)
PLATELET # BLD AUTO: 177 K/UL (ref 130–450)
PMV BLD AUTO: 10.6 FL (ref 7–12)
POTASSIUM SERPL-SCNC: 3.8 MMOL/L (ref 3.5–5)
PROT SERPL-MCNC: 6 G/DL (ref 6.4–8.3)
RBC # BLD AUTO: 4.91 M/UL (ref 3.8–5.8)
RBC # BLD: ABNORMAL 10*6/UL
RBC # BLD: ABNORMAL 10*6/UL
SODIUM SERPL-SCNC: 139 MMOL/L (ref 132–146)
WBC OTHER # BLD: 9.2 K/UL (ref 4.5–11.5)

## 2023-08-09 PROCEDURE — 6360000002 HC RX W HCPCS: Performed by: INTERNAL MEDICINE

## 2023-08-09 PROCEDURE — 85025 COMPLETE CBC W/AUTO DIFF WBC: CPT

## 2023-08-09 PROCEDURE — 6370000000 HC RX 637 (ALT 250 FOR IP): Performed by: INTERNAL MEDICINE

## 2023-08-09 PROCEDURE — 80053 COMPREHEN METABOLIC PANEL: CPT

## 2023-08-09 PROCEDURE — 2580000003 HC RX 258

## 2023-08-09 PROCEDURE — 85652 RBC SED RATE AUTOMATED: CPT

## 2023-08-09 PROCEDURE — 2580000003 HC RX 258: Performed by: INTERNAL MEDICINE

## 2023-08-09 PROCEDURE — 94640 AIRWAY INHALATION TREATMENT: CPT

## 2023-08-09 PROCEDURE — 83735 ASSAY OF MAGNESIUM: CPT

## 2023-08-09 PROCEDURE — 82248 BILIRUBIN DIRECT: CPT

## 2023-08-09 PROCEDURE — 2500000003 HC RX 250 WO HCPCS: Performed by: INTERNAL MEDICINE

## 2023-08-09 PROCEDURE — 83605 ASSAY OF LACTIC ACID: CPT

## 2023-08-09 PROCEDURE — 2700000000 HC OXYGEN THERAPY PER DAY

## 2023-08-09 PROCEDURE — 84100 ASSAY OF PHOSPHORUS: CPT

## 2023-08-09 PROCEDURE — 86140 C-REACTIVE PROTEIN: CPT

## 2023-08-09 PROCEDURE — 1200000000 HC SEMI PRIVATE

## 2023-08-09 RX ORDER — SODIUM CHLORIDE 0.9 % (FLUSH) 0.9 %
SYRINGE (ML) INJECTION
Status: COMPLETED
Start: 2023-08-09 | End: 2023-08-09

## 2023-08-09 RX ORDER — BENZONATATE 100 MG/1
200 CAPSULE ORAL 3 TIMES DAILY
Status: DISCONTINUED | OUTPATIENT
Start: 2023-08-09 | End: 2023-08-10 | Stop reason: HOSPADM

## 2023-08-09 RX ORDER — METHYLPREDNISOLONE SODIUM SUCCINATE 40 MG/ML
40 INJECTION, POWDER, LYOPHILIZED, FOR SOLUTION INTRAMUSCULAR; INTRAVENOUS EVERY 12 HOURS
Status: COMPLETED | OUTPATIENT
Start: 2023-08-09 | End: 2023-08-09

## 2023-08-09 RX ADMIN — GUAIFENESIN 400 MG: 400 TABLET ORAL at 17:26

## 2023-08-09 RX ADMIN — METHYLPREDNISOLONE SODIUM SUCCINATE 40 MG: 40 INJECTION INTRAMUSCULAR; INTRAVENOUS at 04:38

## 2023-08-09 RX ADMIN — PANTOPRAZOLE SODIUM 40 MG: 40 TABLET, DELAYED RELEASE ORAL at 07:10

## 2023-08-09 RX ADMIN — AZITHROMYCIN 500 MG: 250 TABLET, FILM COATED ORAL at 21:44

## 2023-08-09 RX ADMIN — DOCUSATE SODIUM 100 MG: 100 CAPSULE, LIQUID FILLED ORAL at 10:18

## 2023-08-09 RX ADMIN — DOCUSATE SODIUM 100 MG: 100 CAPSULE, LIQUID FILLED ORAL at 19:27

## 2023-08-09 RX ADMIN — SODIUM CHLORIDE, PRESERVATIVE FREE 10 ML: 5 INJECTION INTRAVENOUS at 10:19

## 2023-08-09 RX ADMIN — ARFORMOTEROL TARTRATE 15 MCG: 15 SOLUTION RESPIRATORY (INHALATION) at 20:17

## 2023-08-09 RX ADMIN — ARFORMOTEROL TARTRATE 15 MCG: 15 SOLUTION RESPIRATORY (INHALATION) at 09:22

## 2023-08-09 RX ADMIN — BUMETANIDE 1 MG: 0.25 INJECTION INTRAMUSCULAR; INTRAVENOUS at 10:18

## 2023-08-09 RX ADMIN — BENZONATATE 200 MG: 100 CAPSULE ORAL at 00:01

## 2023-08-09 RX ADMIN — IPRATROPIUM BROMIDE AND ALBUTEROL SULFATE 1 DOSE: 2.5; .5 SOLUTION RESPIRATORY (INHALATION) at 20:17

## 2023-08-09 RX ADMIN — IPRATROPIUM BROMIDE AND ALBUTEROL SULFATE 1 DOSE: 2.5; .5 SOLUTION RESPIRATORY (INHALATION) at 23:56

## 2023-08-09 RX ADMIN — ENOXAPARIN SODIUM 40 MG: 100 INJECTION SUBCUTANEOUS at 10:19

## 2023-08-09 RX ADMIN — GUAIFENESIN AND CODEINE PHOSPHATE 5 ML: 100; 10 SOLUTION ORAL at 11:31

## 2023-08-09 RX ADMIN — IPRATROPIUM BROMIDE AND ALBUTEROL SULFATE 1 DOSE: 2.5; .5 SOLUTION RESPIRATORY (INHALATION) at 13:09

## 2023-08-09 RX ADMIN — IPRATROPIUM BROMIDE AND ALBUTEROL SULFATE 1 DOSE: 2.5; .5 SOLUTION RESPIRATORY (INHALATION) at 16:34

## 2023-08-09 RX ADMIN — BENZONATATE 200 MG: 100 CAPSULE ORAL at 19:26

## 2023-08-09 RX ADMIN — IPRATROPIUM BROMIDE AND ALBUTEROL SULFATE 1 DOSE: 2.5; .5 SOLUTION RESPIRATORY (INHALATION) at 03:21

## 2023-08-09 RX ADMIN — GUAIFENESIN 400 MG: 400 TABLET ORAL at 19:26

## 2023-08-09 RX ADMIN — GUAIFENESIN AND CODEINE PHOSPHATE 5 ML: 100; 10 SOLUTION ORAL at 18:48

## 2023-08-09 RX ADMIN — METHYLPREDNISOLONE SODIUM SUCCINATE 40 MG: 40 INJECTION INTRAMUSCULAR; INTRAVENOUS at 11:31

## 2023-08-09 RX ADMIN — METHYLPREDNISOLONE SODIUM SUCCINATE 40 MG: 40 INJECTION, POWDER, FOR SOLUTION INTRAMUSCULAR; INTRAVENOUS at 21:47

## 2023-08-09 RX ADMIN — DOXYCYCLINE 100 MG: 100 INJECTION, POWDER, LYOPHILIZED, FOR SOLUTION INTRAVENOUS at 10:23

## 2023-08-09 RX ADMIN — IPRATROPIUM BROMIDE AND ALBUTEROL SULFATE 1 DOSE: 2.5; .5 SOLUTION RESPIRATORY (INHALATION) at 09:22

## 2023-08-09 RX ADMIN — GUAIFENESIN 400 MG: 400 TABLET ORAL at 14:32

## 2023-08-09 RX ADMIN — BUDESONIDE INHALATION 500 MCG: 0.5 SUSPENSION RESPIRATORY (INHALATION) at 09:22

## 2023-08-09 RX ADMIN — BUMETANIDE 1 MG: 0.25 INJECTION INTRAMUSCULAR; INTRAVENOUS at 21:45

## 2023-08-09 RX ADMIN — GUAIFENESIN AND CODEINE PHOSPHATE 5 ML: 100; 10 SOLUTION ORAL at 00:01

## 2023-08-09 RX ADMIN — BENZONATATE 200 MG: 100 CAPSULE ORAL at 14:32

## 2023-08-09 RX ADMIN — GUAIFENESIN 400 MG: 400 TABLET ORAL at 10:19

## 2023-08-09 RX ADMIN — BUDESONIDE INHALATION 500 MCG: 0.5 SUSPENSION RESPIRATORY (INHALATION) at 20:17

## 2023-08-09 ASSESSMENT — PAIN SCALES - GENERAL
PAINLEVEL_OUTOF10: 0
PAINLEVEL_OUTOF10: 0

## 2023-08-09 NOTE — CARE COORDINATION
8/9/2023  Social Work Discharge Planning:Pt is now on 6l o2 and uses 4-4.5 at home via portable concentrator from [facility] [place] [facility]. Continue to wean o2. Pt is independent from home . Spouse will transport at discharge.  Electronically signed by MALVIN Iqbal on 8/9/2023 at 8:10 AM

## 2023-08-09 NOTE — PROGRESS NOTES
the patient's family/caregiver; documenting clinical information in the electronic record                ------------  INFORMATION  -----------      DIET:ADULT DIET; Regular; Low Sodium (2 gm)      No Known Allergies      MEDICATION SIDE EFFECTS:none       SCHEDULED MEDS:                                 Scheduled Meds:   cefTRIAXone (ROCEPHIN) IV  1,000 mg IntraVENous Q24H    doxycycline (VIBRAMYCIN) IV  100 mg IntraVENous Q12H    bumetanide  1 mg IntraVENous Q12H    azithromycin  500 mg Oral Once per day on Mon Wed Fri    budesonide  500 mcg Nebulization BID    guaiFENesin  400 mg Oral 4x Daily    ipratropium 0.5 mg-albuterol 2.5 mg  1 Dose Inhalation Q4H    arformoterol tartrate  15 mcg Nebulization BID RT    methylPREDNISolone  40 mg IntraVENous Q8H    docusate sodium  100 mg Oral BID    enoxaparin  40 mg SubCUTAneous Daily    pantoprazole  40 mg Oral QAM AC       Continuous Infusions:      Data:       Intake/Output Summary (Last 24 hours) at 8/9/2023 1134  Last data filed at 8/9/2023 1125  Gross per 24 hour   Intake 1000 ml   Output 6400 ml   Net -5400 ml       Wt Readings from Last 3 Encounters:   08/09/23 197 lb (89.4 kg)   08/01/23 190 lb (86.2 kg)   07/24/23 190 lb (86.2 kg)       Labs: Additional    GLUCOSE:No results for input(s): POCGLU in the last 72 hours. BNP:No results found for: BNP    CRP:   Recent Labs     08/08/23  0430 08/09/23  0430   CRP 14.0* 8.0*       ESR:  Recent Labs     08/08/23  0430 08/09/23  0430   SEDRATE 3 0       RADIOLOGY: REVIEWED AVAILABLE REPORT  CT CHEST WO CONTRAST   Final Result   No acute airspace disease with advanced chronic obstructive pulmonary disease   and severe emphysema. Increased pulmonary arterial diameters concerning for pulmonary arterial   hypertension as detailed above. Likely reactive adenopathy in the mediastinum         XR CHEST PORTABLE   Final Result   Minimal interval increase in bibasilar opacity, of questionable clinical   significance.

## 2023-08-09 NOTE — PLAN OF CARE
Problem: Discharge Planning  Goal: Discharge to home or other facility with appropriate resources  8/8/2023 2205 by Asif Henry RN  Outcome: Progressing  8/8/2023 0949 by Nilesh North RN  Outcome: Progressing     Problem: Pain  Goal: Verbalizes/displays adequate comfort level or baseline comfort level  8/8/2023 2205 by Asif Henry RN  Outcome: Progressing  8/8/2023 0949 by Nilesh North RN  Outcome: Progressing     Problem: Safety - Adult  Goal: Free from fall injury  Outcome: Progressing     Problem: Chronic Conditions and Co-morbidities  Goal: Patient's chronic conditions and co-morbidity symptoms are monitored and maintained or improved  Outcome: Progressing

## 2023-08-10 VITALS
HEIGHT: 73 IN | BODY MASS INDEX: 25.75 KG/M2 | HEART RATE: 84 BPM | DIASTOLIC BLOOD PRESSURE: 72 MMHG | WEIGHT: 194.3 LBS | SYSTOLIC BLOOD PRESSURE: 128 MMHG | TEMPERATURE: 98.2 F | RESPIRATION RATE: 18 BRPM | OXYGEN SATURATION: 93 %

## 2023-08-10 LAB
ALBUMIN SERPL-MCNC: 4.2 G/DL (ref 3.5–5.2)
ALP SERPL-CCNC: 71 U/L (ref 40–129)
ALT SERPL-CCNC: 10 U/L (ref 0–40)
ANION GAP SERPL CALCULATED.3IONS-SCNC: 12 MMOL/L (ref 7–16)
AST SERPL-CCNC: 16 U/L (ref 0–39)
BASOPHILS # BLD: 0.01 K/UL (ref 0–0.2)
BASOPHILS NFR BLD: 0 % (ref 0–2)
BILIRUB DIRECT SERPL-MCNC: <0.2 MG/DL (ref 0–0.3)
BILIRUB INDIRECT SERPL-MCNC: NORMAL MG/DL (ref 0–1)
BILIRUB SERPL-MCNC: 0.3 MG/DL (ref 0–1.2)
BUN SERPL-MCNC: 13 MG/DL (ref 6–23)
CALCIUM SERPL-MCNC: 10.1 MG/DL (ref 8.6–10.2)
CHLORIDE SERPL-SCNC: 99 MMOL/L (ref 98–107)
CO2 SERPL-SCNC: 28 MMOL/L (ref 22–29)
CREAT SERPL-MCNC: 0.7 MG/DL (ref 0.7–1.2)
CRP SERPL HS-MCNC: 4 MG/L (ref 0–5)
EOSINOPHIL # BLD: 0 K/UL (ref 0.05–0.5)
EOSINOPHILS RELATIVE PERCENT: 0 % (ref 0–6)
ERYTHROCYTE [DISTWIDTH] IN BLOOD BY AUTOMATED COUNT: 15.8 % (ref 11.5–15)
ERYTHROCYTE [SEDIMENTATION RATE] IN BLOOD BY WESTERGREN METHOD: 2 MM/HR (ref 0–15)
GFR SERPL CREATININE-BSD FRML MDRD: >60 ML/MIN/1.73M2
GLUCOSE SERPL-MCNC: 134 MG/DL (ref 74–99)
HCT VFR BLD AUTO: 48.5 % (ref 37–54)
HGB BLD-MCNC: 16.3 G/DL (ref 12.5–16.5)
IMM GRANULOCYTES # BLD AUTO: 0.04 K/UL (ref 0–0.58)
IMM GRANULOCYTES NFR BLD: 0 % (ref 0–5)
LYMPHOCYTES NFR BLD: 0.22 K/UL (ref 1.5–4)
LYMPHOCYTES RELATIVE PERCENT: 2 % (ref 20–42)
MAGNESIUM SERPL-MCNC: 1.9 MG/DL (ref 1.6–2.6)
MCH RBC QN AUTO: 30.5 PG (ref 26–35)
MCHC RBC AUTO-ENTMCNC: 33.6 G/DL (ref 32–34.5)
MCV RBC AUTO: 90.8 FL (ref 80–99.9)
MONOCYTES NFR BLD: 0.27 K/UL (ref 0.1–0.95)
MONOCYTES NFR BLD: 3 % (ref 2–12)
NEUTROPHILS NFR BLD: 95 % (ref 43–80)
NEUTS SEG NFR BLD: 9.64 K/UL (ref 1.8–7.3)
PHOSPHATE SERPL-MCNC: 2.8 MG/DL (ref 2.5–4.5)
PLATELET # BLD AUTO: 223 K/UL (ref 130–450)
PMV BLD AUTO: 10.3 FL (ref 7–12)
POTASSIUM SERPL-SCNC: 4 MMOL/L (ref 3.5–5)
PROT SERPL-MCNC: 6.6 G/DL (ref 6.4–8.3)
RBC # BLD AUTO: 5.34 M/UL (ref 3.8–5.8)
RBC # BLD: NORMAL 10*6/UL
SODIUM SERPL-SCNC: 139 MMOL/L (ref 132–146)
WBC OTHER # BLD: 10.2 K/UL (ref 4.5–11.5)

## 2023-08-10 PROCEDURE — 85025 COMPLETE CBC W/AUTO DIFF WBC: CPT

## 2023-08-10 PROCEDURE — 6360000002 HC RX W HCPCS: Performed by: INTERNAL MEDICINE

## 2023-08-10 PROCEDURE — 85652 RBC SED RATE AUTOMATED: CPT

## 2023-08-10 PROCEDURE — 94640 AIRWAY INHALATION TREATMENT: CPT

## 2023-08-10 PROCEDURE — 6370000000 HC RX 637 (ALT 250 FOR IP): Performed by: INTERNAL MEDICINE

## 2023-08-10 PROCEDURE — 2500000003 HC RX 250 WO HCPCS: Performed by: INTERNAL MEDICINE

## 2023-08-10 PROCEDURE — 80053 COMPREHEN METABOLIC PANEL: CPT

## 2023-08-10 PROCEDURE — 82248 BILIRUBIN DIRECT: CPT

## 2023-08-10 PROCEDURE — 86140 C-REACTIVE PROTEIN: CPT

## 2023-08-10 PROCEDURE — 84100 ASSAY OF PHOSPHORUS: CPT

## 2023-08-10 PROCEDURE — 83735 ASSAY OF MAGNESIUM: CPT

## 2023-08-10 PROCEDURE — 2700000000 HC OXYGEN THERAPY PER DAY

## 2023-08-10 RX ORDER — POTASSIUM CHLORIDE 20 MEQ/1
20 TABLET, EXTENDED RELEASE ORAL DAILY
Qty: 90 TABLET | Refills: 1 | Status: SHIPPED | OUTPATIENT
Start: 2023-08-10

## 2023-08-10 RX ORDER — BENZONATATE 200 MG/1
200 CAPSULE ORAL 3 TIMES DAILY
Qty: 21 CAPSULE | Refills: 0 | Status: SHIPPED | OUTPATIENT
Start: 2023-08-10 | End: 2023-08-17

## 2023-08-10 RX ORDER — BUMETANIDE 2 MG/1
1 TABLET ORAL DAILY
Qty: 30 TABLET | Refills: 3 | Status: SHIPPED | OUTPATIENT
Start: 2023-08-10

## 2023-08-10 RX ORDER — PREDNISONE 10 MG/1
TABLET ORAL
Qty: 18 TABLET | Refills: 0 | Status: SHIPPED | OUTPATIENT
Start: 2023-08-10

## 2023-08-10 RX ADMIN — PREDNISONE 30 MG: 20 TABLET ORAL at 09:33

## 2023-08-10 RX ADMIN — BENZONATATE 200 MG: 100 CAPSULE ORAL at 14:47

## 2023-08-10 RX ADMIN — PANTOPRAZOLE SODIUM 40 MG: 40 TABLET, DELAYED RELEASE ORAL at 06:03

## 2023-08-10 RX ADMIN — IPRATROPIUM BROMIDE AND ALBUTEROL SULFATE 1 DOSE: 2.5; .5 SOLUTION RESPIRATORY (INHALATION) at 12:19

## 2023-08-10 RX ADMIN — BUMETANIDE 1 MG: 0.25 INJECTION INTRAMUSCULAR; INTRAVENOUS at 09:33

## 2023-08-10 RX ADMIN — BUDESONIDE INHALATION 500 MCG: 0.5 SUSPENSION RESPIRATORY (INHALATION) at 08:02

## 2023-08-10 RX ADMIN — IPRATROPIUM BROMIDE AND ALBUTEROL SULFATE 1 DOSE: 2.5; .5 SOLUTION RESPIRATORY (INHALATION) at 04:46

## 2023-08-10 RX ADMIN — ENOXAPARIN SODIUM 40 MG: 100 INJECTION SUBCUTANEOUS at 09:32

## 2023-08-10 RX ADMIN — DOCUSATE SODIUM 100 MG: 100 CAPSULE, LIQUID FILLED ORAL at 09:33

## 2023-08-10 RX ADMIN — IPRATROPIUM BROMIDE AND ALBUTEROL SULFATE 1 DOSE: 2.5; .5 SOLUTION RESPIRATORY (INHALATION) at 08:02

## 2023-08-10 RX ADMIN — GUAIFENESIN 400 MG: 400 TABLET ORAL at 09:33

## 2023-08-10 RX ADMIN — GUAIFENESIN 400 MG: 400 TABLET ORAL at 14:47

## 2023-08-10 RX ADMIN — BENZONATATE 200 MG: 100 CAPSULE ORAL at 09:32

## 2023-08-10 RX ADMIN — ARFORMOTEROL TARTRATE 15 MCG: 15 SOLUTION RESPIRATORY (INHALATION) at 08:02

## 2023-08-10 NOTE — CARE COORDINATION
8/10/2023  Social Work Discharge Planning:Pt is now on 5l o2 here and uses 4-4.5 at home via [facility]. Continue to wean o2. Pt is independent from home . Spouse will transport at discharge.  Electronically signed by MALVIN Moser on 8/10/2023 at 8:10 AM

## 2023-08-10 NOTE — PROGRESS NOTES
CLINICAL PHARMACY NOTE: MEDS TO BEDS    Total # of Prescriptions Filled: 4   The following medications were delivered to the patient:  Bumex 1 mg   Potassium chloride 20 meq  Tessalon pearls 200 mg   Prednisone 10 mg       Additional Documentation:

## 2023-08-10 NOTE — PROGRESS NOTES
Nutrition Education    Educated on 2 gm Na+ diet  Learners: Patient  Readiness:  pt is distracted. Just getting in w/c for discharge  Method: Handout  Response: Needs Reinforcement  Contact name and number provided. Pt was just discharged & getting in w/c for his wife who is waiting downstairs. Written copy of diet given to pt to share w/wife reinforced limiting salt & processed foods in diet.     Janice Moreau RD, LD  Contact Number: [phone]

## 2023-08-10 NOTE — PROGRESS NOTES
02:59 AM    LABALBU 4.2 08/10/2023 02:59 AM    CALCIUM 10.1 08/10/2023 02:59 AM    BILITOT 0.3 08/10/2023 02:59 AM    ALKPHOS 71 08/10/2023 02:59 AM    AST 16 08/10/2023 02:59 AM    ALT 10 08/10/2023 02:59 AM     BMP:    Lab Results   Component Value Date/Time     08/10/2023 02:59 AM    K 4.0 08/10/2023 02:59 AM    K 4.4 11/18/2022 11:23 AM    CL 99 08/10/2023 02:59 AM    CO2 28 08/10/2023 02:59 AM    BUN 13 08/10/2023 02:59 AM    LABALBU 4.2 08/10/2023 02:59 AM    CREATININE 0.7 08/10/2023 02:59 AM    CALCIUM 10.1 08/10/2023 02:59 AM    GFRAA >60 08/01/2022 03:55 PM    LABGLOM >60 08/10/2023 02:59 AM    GLUCOSE 134 08/10/2023 02:59 AM     Hepatic Function Panel:    Lab Results   Component Value Date/Time    ALKPHOS 71 08/10/2023 02:59 AM    ALT 10 08/10/2023 02:59 AM    AST 16 08/10/2023 02:59 AM    PROT 6.6 08/10/2023 02:59 AM    BILITOT 0.3 08/10/2023 02:59 AM    BILIDIR <0.2 08/10/2023 02:59 AM    IBILI Can not be calculated 08/10/2023 02:59 AM    LABALBU 4.2 08/10/2023 02:59 AM     Ionized Calcium:  No results found for: IONCA  Magnesium:    Lab Results   Component Value Date/Time    MG 1.9 08/10/2023 02:59 AM     Phosphorus:    Lab Results   Component Value Date/Time    PHOS 2.8 08/10/2023 02:59 AM     LDH:    Lab Results   Component Value Date/Time     01/02/2021 05:55 PM     Uric Acid:    Lab Results   Component Value Date/Time    LABURIC 6.5 06/12/2023 02:00 AM    URICACID 5.7 08/08/2023 04:30 AM     PT/INR:    Lab Results   Component Value Date/Time    PROTIME 12.5 05/30/2019 11:38 AM    INR 1.1 05/30/2019 11:38 AM     Warfarin PT/INR:  No components found for: PTPATWAR, PTINRWAR  PTT:    Lab Results   Component Value Date/Time    APTT 31.4 05/30/2019 11:38 AM   [APTT}  Troponin:    Lab Results   Component Value Date/Time    TROPONINI <0.01 04/08/2021 06:10 PM     Last 3 Troponin:    Lab Results   Component Value Date/Time    TROPONINI <0.01 04/08/2021 06:10 PM    TROPONINI <0.01 04/08/2021 hours  Decrease methylprednisolone 40 mg IV every 12 hours  Tessalon 200 mg 3 times daily  Potassium placement protocol  Robitussin with codeine 5 cc every 4 hours as needed for severe cough  Continue low-sodium diet  Likely discharge soon on oral diuretics  8/10/2023  Dietary consult regarding low-sodium diet for home  Discontinue methylprednisolone  Prednisone 30 mg daily x 3 days then 20 mg daily x 3 days then 10 mg daily x 3 days then stop  Med reconciliation completed  Prescriptions written  Follow-up Dr. Zoey Nagy 1 week  Bumetanide 1 mg by mouth daily  Percocet 7.51 tablet twice daily as needed for pain  Resume budesonide 500 mcg nebulizer from home  Resume formoterol 20 mcg twice daily  Resume DuoNeb nebulizer at home  Guaifenesin 400 mg, 4 times daily  Azithromycin 250 mg Monday Wednesday Friday  Potassium chloride 20 mill equivalents daily  No added salt diet      Discussed with patient and nursing. Zulema Jaimes DO     10:26 AM     8/10/2023    TIME > 35 MINUTES    Please note that over 35 minutes was spent . Greater than 51% includes interviewing patient and reviewing chart; performing medical examination; ordering medications, tests and/or procedures; formulating assessment plan, reviewing rationale for the above recommendations; reviewing available records, results of all previously ordered tests and procedures and current problem list; counseling/educating the patient; coordinating care with other healthcare professionals; communicating results to the patient's family/caregiver; documenting clinical information in the electronic record                ------------  INFORMATION  -----------      DIET:ADULT DIET; Regular;  Low Sodium (2 gm)      No Known Allergies      MEDICATION SIDE EFFECTS:none       SCHEDULED MEDS:                                 Scheduled Meds:   benzonatate  200 mg Oral TID    predniSONE  30 mg Oral Daily    bumetanide  1 mg IntraVENous Q12H    azithromycin  500 mg Oral Once

## 2023-08-11 ENCOUNTER — CARE COORDINATION (OUTPATIENT)
Dept: CASE MANAGEMENT | Age: 73
End: 2023-08-11

## 2023-08-11 DIAGNOSIS — J44.1 COPD EXACERBATION (HCC): Primary | ICD-10-CM

## 2023-08-11 PROCEDURE — 1111F DSCHRG MED/CURRENT MED MERGE: CPT | Performed by: INTERNAL MEDICINE

## 2023-08-11 NOTE — DISCHARGE SUMMARY
DISCHARGE SUMMARY  Patient ID:  Jonas Avery  [id number]  89 y.o.  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 [facility] Course:       CHIEF COMPLAINT: Short of breath, chest pain           History of Present Illness: 72-year-old male patient of Dr. Ashlyn Pelayo I am asked to admit and follow. History obtained from patient as well as extensive review electronic record. Patient has known COPD on chronic oxygen at home. 4 days prior to admission he noticed increasing dyspnea despite using oxygen 3-6 L/min. Upon arrival to the ED SpO2 was reported to be 75% on home oxygen. He was able to drive himself and wife to the ED. He is also been having chills for the last 4 days; some discoloration of sputum. He was given azithromycin by primary care for further symptoms a few weeks ago which he completed without improvement. Patient is known to have Mycobacterium avium complex and is on chronic azithromycin 3 days a week. --In the ED temperature 98.4 respirations 22 SpO2 90 on 6 L nasal cannula.   Currently on 8 L nasal cannula high flow for SpO2 of 93.  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 yearly CT chest last done 5/15/23    Pulmonary hypertension ([address]) 8/9/2023    By CT of chest 8/8/2023    Pulmonary Mycobacterium avium complex (MAC) infection ([address]) 07/12/2019    BAL results finally completed this date.  follows Dr Mitesh Ruiz lasts seen 2/16/23    Rhinovirus infection 10/16/2020    Right lower lobe pneumonia 11/15/2018    Recurrent 4/23/19    RSV (acute bronchiolitis due to respiratory syncytial virus) 11/18/2022    S/P lumbar fusion 06/01/2019    Thoracic 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; 11/15/18       Past Surgical Hx :  Past Surgical History:   Procedure Laterality Date    ABSCESS DRAINAGE  2006    X2 RECTAL ABSCESS    BRONCHOSCOPY N/A 06/12/2019    BRONCHOSCOPY BRUSHINGS performed by Damon Wagner MD at [address] [place] [address] Street N/A 10/07/2019    BRONCHOSCOPY DIAGNOSTIC OR CELL [address] ONLY performed by Damon Wagner MD at [address] [place] [address] N/A 04/12/2021

## 2023-08-11 NOTE — CARE COORDINATION
12/21/2023  2:00 PM MD WARD Cruz [facility]   2/6/2024  2:00 PM DO ANJANA [facility]       Care Transition Nurse provided contact information.   No further follow-up call indicated, patient declines follow up care transition calls      Sancho Parks RN

## 2023-08-12 LAB
MICROORGANISM SPEC CULT: NORMAL
MICROORGANISM SPEC CULT: NORMAL
SERVICE CMNT-IMP: NORMAL
SERVICE CMNT-IMP: NORMAL
SPECIMEN DESCRIPTION: NORMAL
SPECIMEN DESCRIPTION: NORMAL

## 2023-08-15 ENCOUNTER — OFFICE VISIT (OUTPATIENT)
Dept: PRIMARY CARE CLINIC | Age: 73
End: 2023-08-15

## 2023-08-15 ENCOUNTER — TELEPHONE (OUTPATIENT)
Dept: PRIMARY CARE CLINIC | Age: 73
End: 2023-08-15

## 2023-08-15 VITALS
HEIGHT: 73 IN | DIASTOLIC BLOOD PRESSURE: 62 MMHG | SYSTOLIC BLOOD PRESSURE: 102 MMHG | OXYGEN SATURATION: 91 % | RESPIRATION RATE: 17 BRPM | HEART RATE: 76 BPM | BODY MASS INDEX: 25.71 KG/M2 | TEMPERATURE: 98 F | WEIGHT: 194 LBS

## 2023-08-15 DIAGNOSIS — J96.21 ACUTE ON CHRONIC RESPIRATORY FAILURE WITH HYPOXIA (HCC): ICD-10-CM

## 2023-08-15 DIAGNOSIS — D84.9 IMMUNOCOMPROMISED (HCC): ICD-10-CM

## 2023-08-15 DIAGNOSIS — Z09 HOSPITAL DISCHARGE FOLLOW-UP: Primary | ICD-10-CM

## 2023-08-15 DIAGNOSIS — J44.1 ACUTE EXACERBATION OF CHRONIC OBSTRUCTIVE PULMONARY DISEASE (COPD) (HCC): ICD-10-CM

## 2023-08-15 DIAGNOSIS — I50.31 ACUTE HEART FAILURE WITH PRESERVED EJECTION FRACTION (HCC): ICD-10-CM

## 2023-08-15 DIAGNOSIS — E87.70 HYPERVOLEMIA, UNSPECIFIED HYPERVOLEMIA TYPE: ICD-10-CM

## 2023-08-15 DIAGNOSIS — A31.0 PULMONARY MYCOBACTERIUM AVIUM COMPLEX (MAC) INFECTION (HCC): ICD-10-CM

## 2023-08-15 DIAGNOSIS — I27.20 PULMONARY HYPERTENSION (HCC): ICD-10-CM

## 2023-08-15 PROBLEM — J96.20 ACUTE ON CHRONIC RESPIRATORY FAILURE (HCC): Status: RESOLVED | Noted: 2023-06-10 | Resolved: 2023-08-15

## 2023-08-15 NOTE — TELEPHONE ENCOUNTER
Okay he should continue that same dose and then we will let him know if there is any change to be made once we get the results of his labs.

## 2023-08-15 NOTE — PROGRESS NOTES
Bradley Marino  8/15/23     Chief Complaint   Patient presents with    Follow-Up from Hospital    COPD               No Known Allergies     Current Outpatient Medications   Medication Sig Dispense Refill    predniSONE (DELTASONE) 10 MG tablet 30 mg daily for 3 days and 20 mg daily for 3 days then 10 mg daily for 3 days then stop 18 tablet 0    benzonatate (TESSALON) 200 MG capsule Take 1 capsule by mouth 3 times daily for 7 days 21 capsule 0    bumetanide (BUMEX) 2 MG tablet Take 0.5 tablets by mouth daily 30 tablet 3    potassium chloride (KLOR-CON M) 20 MEQ extended release tablet Take 1 tablet by mouth daily 90 tablet 1    azithromycin (ZITHROMAX) 250 MG tablet Take 2 tablets by mouth three times a week      budesonide (PULMICORT) 0.5 MG/2ML nebulizer suspension Take 2 mLs by nebulization 2 times daily      ipratropium 0.5 mg-albuterol 2.5 mg (DUONEB) 0.5-2.5 (3) MG/3ML SOLN nebulizer solution Inhale 3 mLs into the lungs every 4 hours      guaiFENesin 400 MG tablet Take 1 tablet by mouth 4 times daily 56 tablet 0    OXYGEN Inhale 4-4.5 L/min into the lungs continuous      formoterol (PERFOROMIST) 20 MCG/2ML nebulizer solution Take 2 mLs by nebulization 2 times daily 120 mL 5    oxyCODONE-acetaminophen (PERCOCET) 7.5-325 MG per tablet Take 1 tablet by mouth 2 times daily as needed for Pain. No current facility-administered medications for this visit. HPI: Patient comes after 7-day [facility] hospital follow-up visit. He was admitted due to chest congestion and pressure. He is found to be volume overloaded and was treated with IV diuretics. He diuresed over 7 L of fluid. He was seen in consultation by pulmonology. He was also treated with antibiotics for exacerbation of his COPD. He was also seen in consultation by infectious disease for his history of atypical Mycobacterium lung infection. He was discharged on azithromycin 250 mg 3 times a week.   He was also discharged on a prednisone taper and

## 2023-08-16 ENCOUNTER — HOSPITAL ENCOUNTER (OUTPATIENT)
Age: 73
Discharge: HOME | End: 2023-08-16
Payer: MEDICARE

## 2023-08-16 DIAGNOSIS — E87.70 HYPERVOLEMIA, UNSPECIFIED HYPERVOLEMIA TYPE: ICD-10-CM

## 2023-08-16 DIAGNOSIS — E87.70 HYPERVOLEMIA, UNSPECIFIED HYPERVOLEMIA TYPE: Primary | ICD-10-CM

## 2023-08-16 LAB
ANION GAP SERPL CALCULATED.3IONS-SCNC: 13 MMOL/L (ref 7–16)
BUN SERPL-MCNC: 20 MG/DL (ref 6–23)
CALCIUM SERPL-MCNC: 10.3 MG/DL (ref 8.6–10.2)
CHLORIDE SERPL-SCNC: 97 MMOL/L (ref 98–107)
CO2 SERPL-SCNC: 27 MMOL/L (ref 22–29)
CREAT SERPL-MCNC: 0.8 MG/DL (ref 0.7–1.2)
GFR SERPL CREATININE-BSD FRML MDRD: >60 ML/MIN/1.73M2
GLUCOSE SERPL-MCNC: 93 MG/DL (ref 74–99)
POTASSIUM SERPL-SCNC: 4.4 MMOL/L (ref 3.5–5)
SODIUM SERPL-SCNC: 137 MMOL/L (ref 132–146)

## 2023-08-16 PROCEDURE — 80048 BASIC METABOLIC PNL TOTAL CA: CPT

## 2023-09-06 ENCOUNTER — HOSPITAL ENCOUNTER (OUTPATIENT)
Age: 73
Discharge: HOME | End: 2023-09-06
Payer: MEDICARE

## 2023-09-06 DIAGNOSIS — E87.70 HYPERVOLEMIA, UNSPECIFIED HYPERVOLEMIA TYPE: ICD-10-CM

## 2023-09-06 LAB
ANION GAP SERPL CALCULATED.3IONS-SCNC: 10 MMOL/L (ref 7–16)
BUN SERPL-MCNC: 9 MG/DL (ref 6–23)
CALCIUM SERPL-MCNC: 10.3 MG/DL (ref 8.6–10.2)
CHLORIDE SERPL-SCNC: 104 MMOL/L (ref 98–107)
CO2 SERPL-SCNC: 26 MMOL/L (ref 22–29)
CREAT SERPL-MCNC: 0.8 MG/DL (ref 0.7–1.2)
GFR SERPL CREATININE-BSD FRML MDRD: >60 ML/MIN/1.73M2
GLUCOSE SERPL-MCNC: 89 MG/DL (ref 74–99)
POTASSIUM SERPL-SCNC: 4.4 MMOL/L (ref 3.5–5)
SODIUM SERPL-SCNC: 140 MMOL/L (ref 132–146)

## 2023-09-06 PROCEDURE — 80048 BASIC METABOLIC PNL TOTAL CA: CPT

## 2023-09-19 ENCOUNTER — OFFICE VISIT (OUTPATIENT)
Dept: PRIMARY CARE CLINIC | Age: 73
End: 2023-09-19

## 2023-09-19 VITALS
TEMPERATURE: 98.1 F | SYSTOLIC BLOOD PRESSURE: 110 MMHG | HEIGHT: 73 IN | BODY MASS INDEX: 24.65 KG/M2 | HEART RATE: 93 BPM | OXYGEN SATURATION: 80 % | RESPIRATION RATE: 18 BRPM | DIASTOLIC BLOOD PRESSURE: 80 MMHG | WEIGHT: 186 LBS

## 2023-09-19 DIAGNOSIS — J44.9 CHRONIC OBSTRUCTIVE PULMONARY DISEASE, UNSPECIFIED COPD TYPE (HCC): Primary | ICD-10-CM

## 2023-09-19 DIAGNOSIS — M54.50 CHRONIC BILATERAL LOW BACK PAIN WITHOUT SCIATICA: ICD-10-CM

## 2023-09-19 DIAGNOSIS — G89.29 CHRONIC BILATERAL LOW BACK PAIN WITHOUT SCIATICA: ICD-10-CM

## 2023-09-19 DIAGNOSIS — A31.0 PULMONARY MYCOBACTERIUM AVIUM COMPLEX (MAC) INFECTION (HCC): ICD-10-CM

## 2023-09-19 DIAGNOSIS — D84.9 IMMUNOCOMPROMISED (HCC): ICD-10-CM

## 2023-09-19 DIAGNOSIS — D75.1 ERYTHROCYTOSIS DUE TO HYPOXEMIA: ICD-10-CM

## 2023-09-19 DIAGNOSIS — G89.4 CHRONIC PAIN SYNDROME: ICD-10-CM

## 2023-09-19 DIAGNOSIS — I50.32 CHRONIC DIASTOLIC HEART FAILURE (HCC): ICD-10-CM

## 2023-09-19 DIAGNOSIS — J96.11 CHRONIC RESPIRATORY FAILURE WITH HYPOXIA (HCC): ICD-10-CM

## 2023-09-19 DIAGNOSIS — M51.36 LUMBAR DEGENERATIVE DISC DISEASE: ICD-10-CM

## 2023-09-19 DIAGNOSIS — I27.20 PULMONARY HYPERTENSION (HCC): ICD-10-CM

## 2023-09-30 PROBLEM — I50.32 CHRONIC DIASTOLIC HEART FAILURE (HCC): Status: ACTIVE | Noted: 2023-09-30

## 2023-09-30 PROBLEM — J44.1 ACUTE EXACERBATION OF CHRONIC OBSTRUCTIVE PULMONARY DISEASE (COPD) (HCC): Status: RESOLVED | Noted: 2023-08-07 | Resolved: 2023-09-30

## 2023-09-30 PROBLEM — I50.31 ACUTE HEART FAILURE WITH PRESERVED EJECTION FRACTION (HCC): Status: RESOLVED | Noted: 2021-04-08 | Resolved: 2023-09-30

## 2023-09-30 PROBLEM — J96.21 ACUTE ON CHRONIC RESPIRATORY FAILURE WITH HYPOXIA (HCC): Status: RESOLVED | Noted: 2023-06-11 | Resolved: 2023-09-30

## 2023-12-21 ENCOUNTER — OFFICE VISIT (OUTPATIENT)
Dept: PRIMARY CARE CLINIC | Age: 73
End: 2023-12-21

## 2023-12-21 VITALS
SYSTOLIC BLOOD PRESSURE: 120 MMHG | BODY MASS INDEX: 25.84 KG/M2 | OXYGEN SATURATION: 89 % | DIASTOLIC BLOOD PRESSURE: 70 MMHG | HEIGHT: 73 IN | TEMPERATURE: 97.9 F | HEART RATE: 84 BPM | WEIGHT: 195 LBS | RESPIRATION RATE: 16 BRPM

## 2023-12-21 DIAGNOSIS — G89.4 CHRONIC PAIN SYNDROME: ICD-10-CM

## 2023-12-21 DIAGNOSIS — J96.11 CHRONIC RESPIRATORY FAILURE WITH HYPOXIA (HCC): ICD-10-CM

## 2023-12-21 DIAGNOSIS — D84.9 IMMUNOCOMPROMISED (HCC): ICD-10-CM

## 2023-12-21 DIAGNOSIS — A31.0 PULMONARY MYCOBACTERIUM AVIUM COMPLEX (MAC) INFECTION (HCC): ICD-10-CM

## 2023-12-21 DIAGNOSIS — M54.50 CHRONIC BILATERAL LOW BACK PAIN WITHOUT SCIATICA: ICD-10-CM

## 2023-12-21 DIAGNOSIS — G89.29 CHRONIC BILATERAL LOW BACK PAIN WITHOUT SCIATICA: ICD-10-CM

## 2023-12-21 DIAGNOSIS — M51.36 LUMBAR DEGENERATIVE DISC DISEASE: ICD-10-CM

## 2023-12-21 DIAGNOSIS — Z00.00 MEDICARE ANNUAL WELLNESS VISIT, SUBSEQUENT: Primary | ICD-10-CM

## 2023-12-21 DIAGNOSIS — I50.32 CHRONIC DIASTOLIC HEART FAILURE (HCC): ICD-10-CM

## 2023-12-21 DIAGNOSIS — J44.9 CHRONIC OBSTRUCTIVE PULMONARY DISEASE, UNSPECIFIED COPD TYPE (HCC): ICD-10-CM

## 2023-12-21 DIAGNOSIS — I27.20 PULMONARY HYPERTENSION (HCC): ICD-10-CM

## 2023-12-21 ASSESSMENT — PATIENT HEALTH QUESTIONNAIRE - PHQ9
SUM OF ALL RESPONSES TO PHQ QUESTIONS 1-9: 0
SUM OF ALL RESPONSES TO PHQ9 QUESTIONS 1 & 2: 0
2. FEELING DOWN, DEPRESSED OR HOPELESS: 0
SUM OF ALL RESPONSES TO PHQ QUESTIONS 1-9: 0
1. LITTLE INTEREST OR PLEASURE IN DOING THINGS: 0

## 2024-01-13 NOTE — PROGRESS NOTES
[facility]  EMERGENCY DEPARTMENT ENCOUNTER      Pt Name: Hermes Schumacher  MRN: [id number]  Birthdate 1970  Date of evaluation: 1/13/2024  Provider: Ulisses Deal MD    CHIEF COMPLAINT       Chief Complaint   Patient presents with    Shortness of Breath    Leg Swelling       HISTORY OF PRESENT ILLNESS    HPI    53-year-old male with ETOH cirrhosis, recent admission for GI bleed with hemoglobin of 4, required brief ICU stay, here for full body swelling.  Reports since leaving the hospital he has developed significant bilateral leg swelling extending up into his abdomen.  States abdomen has become distended and is making it difficult to breathe.  He denies fevers, chills.  No confusion, vision changes.  No significant abdominal pain, just distention and tightness.  He has never had a paracentesis before.    Nursing notes reviewed.    REVIEW OF SYSTEMS     Review of Systems  Unless otherwise stated, a complete review of systems was asked of the patient. Pertinent positives are noted in the HPI section.    PAST MEDICAL HISTORY     Past Medical History:   Diagnosis Date    H/O ETOH abuse     alcohol use x 25 years.    H/O: upper GI bleed     Jan-2024 Hb:  4 >> 7 (transfused)       SURGICAL HISTORY       Past Surgical History:   Procedure Laterality Date    COLONOSCOPY N/A 1/5/2024    COLONOSCOPY DIAGNOSTIC performed by Ximena Arteaga MD at [facility] ENDOSCOPY    UPPER GASTROINTESTINAL ENDOSCOPY N/A 1/4/2024    EGD ESOPHAGOGASTRODUODENOSCOPY at bedside performed by Ximena Arteaga MD at [facility] ENDOSCOPY       CURRENT MEDICATIONS       Previous Medications    CALCIUM CARBONATE (TUMS) 500 MG CHEWABLE TABLET    Take 1 tablet by mouth 3 times daily as needed for Heartburn    FAMOTIDINE (PEPCID) 20 MG TABLET    Take 1 tablet by mouth 2 times daily    FUROSEMIDE (LASIX) 40 MG TABLET    Take 1 tablet by mouth daily    MULTIPLE VITAMIN (MULTIVITAMIN) TABS TABLET    Take 1 tablet by mouth daily    PHOSPHORUS (K PHOS 
follow-up visit.

## 2024-01-14 ENCOUNTER — HOSPITAL ENCOUNTER (INPATIENT)
Age: 74
LOS: 11 days | Discharge: HOME | End: 2024-01-25
Attending: STUDENT IN AN ORGANIZED HEALTH CARE EDUCATION/TRAINING PROGRAM | Admitting: STUDENT IN AN ORGANIZED HEALTH CARE EDUCATION/TRAINING PROGRAM
Payer: MEDICARE

## 2024-01-14 ENCOUNTER — APPOINTMENT (OUTPATIENT)
Dept: GENERAL RADIOLOGY | Age: 74
DRG: 190 | End: 2024-01-14
Payer: MEDICARE

## 2024-01-14 ENCOUNTER — APPOINTMENT (OUTPATIENT)
Dept: CT IMAGING | Age: 74
DRG: 190 | End: 2024-01-14
Payer: MEDICARE

## 2024-01-14 ENCOUNTER — HOSPITAL ENCOUNTER (INPATIENT)
Age: 74
LOS: 1 days | Discharge: ANOTHER HOSPITAL | DRG: 190 | End: 2024-01-14
Attending: EMERGENCY MEDICINE | Admitting: STUDENT IN AN ORGANIZED HEALTH CARE EDUCATION/TRAINING PROGRAM
Payer: MEDICARE

## 2024-01-14 VITALS
DIASTOLIC BLOOD PRESSURE: 70 MMHG | TEMPERATURE: 97.9 F | SYSTOLIC BLOOD PRESSURE: 112 MMHG | HEART RATE: 77 BPM | RESPIRATION RATE: 20 BRPM | OXYGEN SATURATION: 94 %

## 2024-01-14 DIAGNOSIS — J96.01 ACUTE RESPIRATORY FAILURE WITH HYPOXIA (HCC): Primary | ICD-10-CM

## 2024-01-14 PROBLEM — J44.1 COPD EXACERBATION (HCC): Status: ACTIVE | Noted: 2024-01-14

## 2024-01-14 LAB
ALBUMIN SERPL-MCNC: 4.4 G/DL (ref 3.5–5.2)
ALP SERPL-CCNC: 76 U/L (ref 40–129)
ALT SERPL-CCNC: 11 U/L (ref 0–40)
ANION GAP SERPL CALCULATED.3IONS-SCNC: 10 MMOL/L (ref 7–16)
AST SERPL-CCNC: 21 U/L (ref 0–39)
B PARAP IS1001 DNA NPH QL NAA+NON-PROBE: NOT DETECTED
B PERT DNA SPEC QL NAA+PROBE: NOT DETECTED
BASOPHILS # BLD: 0.02 K/UL (ref 0–0.2)
BASOPHILS NFR BLD: 0 % (ref 0–2)
BILIRUB SERPL-MCNC: 0.7 MG/DL (ref 0–1.2)
BNP SERPL-MCNC: 833 PG/ML (ref 0–125)
BUN SERPL-MCNC: 9 MG/DL (ref 6–23)
C PNEUM DNA NPH QL NAA+NON-PROBE: NOT DETECTED
CALCIUM SERPL-MCNC: 9.9 MG/DL (ref 8.6–10.2)
CHLORIDE SERPL-SCNC: 101 MMOL/L (ref 98–107)
CO2 SERPL-SCNC: 28 MMOL/L (ref 22–29)
CREAT SERPL-MCNC: 0.9 MG/DL (ref 0.7–1.2)
D DIMER: 262 NG/ML DDU (ref 0–232)
EOSINOPHIL # BLD: 0.01 K/UL (ref 0.05–0.5)
EOSINOPHILS RELATIVE PERCENT: 0 % (ref 0–6)
ERYTHROCYTE [DISTWIDTH] IN BLOOD BY AUTOMATED COUNT: 14.4 % (ref 11.5–15)
ERYTHROCYTE [SEDIMENTATION RATE] IN BLOOD BY WESTERGREN METHOD: 1 MM/HR (ref 0–15)
FLUAV RNA NPH QL NAA+NON-PROBE: NOT DETECTED
FLUAV RNA RESP QL NAA+PROBE: NOT DETECTED
FLUBV RNA NPH QL NAA+NON-PROBE: NOT DETECTED
FLUBV RNA RESP QL NAA+PROBE: NOT DETECTED
GFR SERPL CREATININE-BSD FRML MDRD: >60 ML/MIN/1.73M2
GLUCOSE SERPL-MCNC: 110 MG/DL (ref 74–99)
HADV DNA NPH QL NAA+NON-PROBE: NOT DETECTED
HCOV 229E RNA NPH QL NAA+NON-PROBE: NOT DETECTED
HCOV HKU1 RNA NPH QL NAA+NON-PROBE: NOT DETECTED
HCOV NL63 RNA NPH QL NAA+NON-PROBE: NOT DETECTED
HCOV OC43 RNA NPH QL NAA+NON-PROBE: NOT DETECTED
HCT VFR BLD AUTO: 53.6 % (ref 37–54)
HGB BLD-MCNC: 18 G/DL (ref 12.5–16.5)
HMPV RNA NPH QL NAA+NON-PROBE: NOT DETECTED
HPIV1 RNA NPH QL NAA+NON-PROBE: NOT DETECTED
HPIV2 RNA NPH QL NAA+NON-PROBE: NOT DETECTED
HPIV3 RNA NPH QL NAA+NON-PROBE: NOT DETECTED
HPIV4 RNA NPH QL NAA+NON-PROBE: DETECTED
IMM GRANULOCYTES # BLD AUTO: <0.03 K/UL (ref 0–0.58)
IMM GRANULOCYTES NFR BLD: 0 % (ref 0–5)
LACTATE BLDV-SCNC: 1.8 MMOL/L (ref 0.5–2.2)
LYMPHOCYTES NFR BLD: 0.57 K/UL (ref 1.5–4)
LYMPHOCYTES RELATIVE PERCENT: 12 % (ref 20–42)
M PNEUMO DNA NPH QL NAA+NON-PROBE: NOT DETECTED
MCH RBC QN AUTO: 29.9 PG (ref 26–35)
MCHC RBC AUTO-ENTMCNC: 33.6 G/DL (ref 32–34.5)
MCV RBC AUTO: 89 FL (ref 80–99.9)
MONOCYTES NFR BLD: 0.36 K/UL (ref 0.1–0.95)
MONOCYTES NFR BLD: 8 % (ref 2–12)
NEGATIVE BASE EXCESS, ART: 1.3 MMOL/L
NEUTROPHILS NFR BLD: 79 % (ref 43–80)
NEUTS SEG NFR BLD: 3.77 K/UL (ref 1.8–7.3)
O2 DELIVERY DEVICE: ABNORMAL
PLATELET # BLD AUTO: 148 K/UL (ref 130–450)
PMV BLD AUTO: 10.4 FL (ref 7–12)
POC HCO3: 23 MMOL/L (ref 22–26)
POC O2 SATURATION: 99.6 % (ref 92–98.5)
POC PCO2: 37.1 MM HG (ref 35–45)
POC PH: 7.4 (ref 7.35–7.45)
POC PO2: 172.9 MM HG (ref 60–80)
POTASSIUM SERPL-SCNC: 4.1 MMOL/L (ref 3.5–5)
PROCALCITONIN SERPL-MCNC: 0.15 NG/ML (ref 0–0.08)
PROT SERPL-MCNC: 7 G/DL (ref 6.4–8.3)
RBC # BLD AUTO: 6.02 M/UL (ref 3.8–5.8)
RSV BY PCR: NOT DETECTED
RSV RNA NPH QL NAA+NON-PROBE: NOT DETECTED
RV+EV RNA NPH QL NAA+NON-PROBE: NOT DETECTED
SAMPLE SITE: ABNORMAL
SARS-COV-2 RNA NPH QL NAA+NON-PROBE: NOT DETECTED
SARS-COV-2 RNA RESP QL NAA+PROBE: NOT DETECTED
SODIUM SERPL-SCNC: 139 MMOL/L (ref 132–146)
SOURCE: NORMAL
SPECIMEN DESCRIPTION: ABNORMAL
SPECIMEN DESCRIPTION: NORMAL
SPECIMEN SOURCE: NORMAL
TROPONIN I SERPL HS-MCNC: 12 NG/L (ref 0–11)
WBC OTHER # BLD: 4.8 K/UL (ref 4.5–11.5)

## 2024-01-14 PROCEDURE — 83880 ASSAY OF NATRIURETIC PEPTIDE: CPT

## 2024-01-14 PROCEDURE — 84145 PROCALCITONIN (PCT): CPT

## 2024-01-14 PROCEDURE — 5A09357 ASSISTANCE WITH RESPIRATORY VENTILATION, LESS THAN 24 CONSECUTIVE HOURS, CONTINUOUS POSITIVE AIRWAY PRESSURE: ICD-10-PCS | Performed by: EMERGENCY MEDICINE

## 2024-01-14 PROCEDURE — 80053 COMPREHEN METABOLIC PANEL: CPT

## 2024-01-14 PROCEDURE — 71045 X-RAY EXAM CHEST 1 VIEW: CPT

## 2024-01-14 PROCEDURE — 99285 EMERGENCY DEPT VISIT HI MDM: CPT

## 2024-01-14 PROCEDURE — 85025 COMPLETE CBC W/AUTO DIFF WBC: CPT

## 2024-01-14 PROCEDURE — 6360000002 HC RX W HCPCS: Performed by: EMERGENCY MEDICINE

## 2024-01-14 PROCEDURE — 2580000003 HC RX 258: Performed by: EMERGENCY MEDICINE

## 2024-01-14 PROCEDURE — 2060000000 HC ICU INTERMEDIATE R&B

## 2024-01-14 PROCEDURE — 87636 SARSCOV2 & INF A&B AMP PRB: CPT

## 2024-01-14 PROCEDURE — 2500000003 HC RX 250 WO HCPCS: Performed by: EMERGENCY MEDICINE

## 2024-01-14 PROCEDURE — 83605 ASSAY OF LACTIC ACID: CPT

## 2024-01-14 PROCEDURE — 6370000000 HC RX 637 (ALT 250 FOR IP): Performed by: EMERGENCY MEDICINE

## 2024-01-14 PROCEDURE — 96365 THER/PROPH/DIAG IV INF INIT: CPT

## 2024-01-14 PROCEDURE — 87040 BLOOD CULTURE FOR BACTERIA: CPT

## 2024-01-14 PROCEDURE — 93005 ELECTROCARDIOGRAM TRACING: CPT | Performed by: EMERGENCY MEDICINE

## 2024-01-14 PROCEDURE — 85652 RBC SED RATE AUTOMATED: CPT

## 2024-01-14 PROCEDURE — 96375 TX/PRO/DX INJ NEW DRUG ADDON: CPT

## 2024-01-14 PROCEDURE — 87634 RSV DNA/RNA AMP PROBE: CPT

## 2024-01-14 PROCEDURE — 84484 ASSAY OF TROPONIN QUANT: CPT

## 2024-01-14 PROCEDURE — 99223 1ST HOSP IP/OBS HIGH 75: CPT | Performed by: STUDENT IN AN ORGANIZED HEALTH CARE EDUCATION/TRAINING PROGRAM

## 2024-01-14 PROCEDURE — 71275 CT ANGIOGRAPHY CHEST: CPT

## 2024-01-14 PROCEDURE — 96368 THER/DIAG CONCURRENT INF: CPT

## 2024-01-14 PROCEDURE — 0202U NFCT DS 22 TRGT SARS-COV-2: CPT

## 2024-01-14 PROCEDURE — 96367 TX/PROPH/DG ADDL SEQ IV INF: CPT

## 2024-01-14 PROCEDURE — 82803 BLOOD GASES ANY COMBINATION: CPT

## 2024-01-14 PROCEDURE — 85379 FIBRIN DEGRADATION QUANT: CPT

## 2024-01-14 PROCEDURE — 6360000004 HC RX CONTRAST MEDICATION: Performed by: RADIOLOGY

## 2024-01-14 RX ORDER — ACETAMINOPHEN 650 MG/1
650 SUPPOSITORY RECTAL EVERY 6 HOURS PRN
Status: DISCONTINUED | OUTPATIENT
Start: 2024-01-14 | End: 2024-01-14 | Stop reason: HOSPADM

## 2024-01-14 RX ORDER — OXYCODONE AND ACETAMINOPHEN 7.5; 325 MG/1; MG/1
1 TABLET ORAL 2 TIMES DAILY PRN
Status: DISCONTINUED | OUTPATIENT
Start: 2024-01-14 | End: 2024-01-14 | Stop reason: HOSPADM

## 2024-01-14 RX ORDER — ONDANSETRON 4 MG/1
4 TABLET, ORALLY DISINTEGRATING ORAL EVERY 8 HOURS PRN
Status: DISCONTINUED | OUTPATIENT
Start: 2024-01-14 | End: 2024-01-14 | Stop reason: HOSPADM

## 2024-01-14 RX ORDER — SODIUM CHLORIDE 0.9 % (FLUSH) 0.9 %
5-40 SYRINGE (ML) INJECTION PRN
Status: DISCONTINUED | OUTPATIENT
Start: 2024-01-14 | End: 2024-01-14 | Stop reason: HOSPADM

## 2024-01-14 RX ORDER — MAGNESIUM SULFATE IN WATER 40 MG/ML
2000 INJECTION, SOLUTION INTRAVENOUS PRN
Status: DISCONTINUED | OUTPATIENT
Start: 2024-01-14 | End: 2024-01-14 | Stop reason: HOSPADM

## 2024-01-14 RX ORDER — ACETAMINOPHEN 325 MG/1
650 TABLET ORAL EVERY 6 HOURS PRN
Status: DISCONTINUED | OUTPATIENT
Start: 2024-01-14 | End: 2024-01-14 | Stop reason: HOSPADM

## 2024-01-14 RX ORDER — POTASSIUM CHLORIDE 7.45 MG/ML
10 INJECTION INTRAVENOUS PRN
Status: DISCONTINUED | OUTPATIENT
Start: 2024-01-14 | End: 2024-01-14 | Stop reason: HOSPADM

## 2024-01-14 RX ORDER — IPRATROPIUM BROMIDE AND ALBUTEROL SULFATE 2.5; .5 MG/3ML; MG/3ML
1 SOLUTION RESPIRATORY (INHALATION)
Status: DISCONTINUED | OUTPATIENT
Start: 2024-01-15 | End: 2024-01-14 | Stop reason: HOSPADM

## 2024-01-14 RX ORDER — ONDANSETRON 2 MG/ML
4 INJECTION INTRAMUSCULAR; INTRAVENOUS EVERY 6 HOURS PRN
Status: DISCONTINUED | OUTPATIENT
Start: 2024-01-14 | End: 2024-01-14 | Stop reason: HOSPADM

## 2024-01-14 RX ORDER — ENOXAPARIN SODIUM 100 MG/ML
40 INJECTION SUBCUTANEOUS DAILY
Status: DISCONTINUED | OUTPATIENT
Start: 2024-01-15 | End: 2024-01-14 | Stop reason: HOSPADM

## 2024-01-14 RX ORDER — DEXAMETHASONE SODIUM PHOSPHATE 10 MG/ML
10 INJECTION INTRAMUSCULAR; INTRAVENOUS ONCE
Status: COMPLETED | OUTPATIENT
Start: 2024-01-14 | End: 2024-01-14

## 2024-01-14 RX ORDER — POTASSIUM CHLORIDE 20 MEQ/1
40 TABLET, EXTENDED RELEASE ORAL PRN
Status: DISCONTINUED | OUTPATIENT
Start: 2024-01-14 | End: 2024-01-14 | Stop reason: HOSPADM

## 2024-01-14 RX ORDER — FAMOTIDINE 20 MG/1
20 TABLET, FILM COATED ORAL 2 TIMES DAILY
Status: DISCONTINUED | OUTPATIENT
Start: 2024-01-14 | End: 2024-01-14 | Stop reason: HOSPADM

## 2024-01-14 RX ORDER — SODIUM CHLORIDE 9 MG/ML
INJECTION, SOLUTION INTRAVENOUS PRN
Status: DISCONTINUED | OUTPATIENT
Start: 2024-01-14 | End: 2024-01-14 | Stop reason: HOSPADM

## 2024-01-14 RX ORDER — IPRATROPIUM BROMIDE AND ALBUTEROL SULFATE 2.5; .5 MG/3ML; MG/3ML
1 SOLUTION RESPIRATORY (INHALATION)
Status: COMPLETED | OUTPATIENT
Start: 2024-01-14 | End: 2024-01-14

## 2024-01-14 RX ORDER — NICOTINE 21 MG/24HR
1 PATCH, TRANSDERMAL 24 HOURS TRANSDERMAL DAILY
Status: DISCONTINUED | OUTPATIENT
Start: 2024-01-14 | End: 2024-01-14

## 2024-01-14 RX ORDER — POLYETHYLENE GLYCOL 3350 17 G/17G
17 POWDER, FOR SOLUTION ORAL DAILY PRN
Status: DISCONTINUED | OUTPATIENT
Start: 2024-01-14 | End: 2024-01-14 | Stop reason: HOSPADM

## 2024-01-14 RX ORDER — IPRATROPIUM BROMIDE AND ALBUTEROL SULFATE 2.5; .5 MG/3ML; MG/3ML
1 SOLUTION RESPIRATORY (INHALATION) ONCE
Status: COMPLETED | OUTPATIENT
Start: 2024-01-14 | End: 2024-01-14

## 2024-01-14 RX ORDER — SODIUM CHLORIDE 0.9 % (FLUSH) 0.9 %
5-40 SYRINGE (ML) INJECTION EVERY 12 HOURS SCHEDULED
Status: DISCONTINUED | OUTPATIENT
Start: 2024-01-14 | End: 2024-01-14 | Stop reason: HOSPADM

## 2024-01-14 RX ORDER — MAGNESIUM SULFATE IN WATER 40 MG/ML
2000 INJECTION, SOLUTION INTRAVENOUS ONCE
Status: COMPLETED | OUTPATIENT
Start: 2024-01-14 | End: 2024-01-14

## 2024-01-14 RX ORDER — AZITHROMYCIN 250 MG/1
500 TABLET, FILM COATED ORAL
COMMUNITY

## 2024-01-14 RX ORDER — DEXAMETHASONE SODIUM PHOSPHATE 10 MG/ML
8 INJECTION INTRAMUSCULAR; INTRAVENOUS EVERY 24 HOURS
Status: DISCONTINUED | OUTPATIENT
Start: 2024-01-15 | End: 2024-01-14 | Stop reason: HOSPADM

## 2024-01-14 RX ORDER — BUMETANIDE 1 MG/1
1 TABLET ORAL
Status: DISCONTINUED | OUTPATIENT
Start: 2024-01-15 | End: 2024-01-14 | Stop reason: HOSPADM

## 2024-01-14 RX ADMIN — IPRATROPIUM BROMIDE AND ALBUTEROL SULFATE 1 DOSE: 2.5; .5 SOLUTION RESPIRATORY (INHALATION) at 12:46

## 2024-01-14 RX ADMIN — MAGNESIUM SULFATE HEPTAHYDRATE 2000 MG: 40 INJECTION, SOLUTION INTRAVENOUS at 13:18

## 2024-01-14 RX ADMIN — IPRATROPIUM BROMIDE AND ALBUTEROL SULFATE 1 DOSE: 2.5; .5 SOLUTION RESPIRATORY (INHALATION) at 21:08

## 2024-01-14 RX ADMIN — IPRATROPIUM BROMIDE AND ALBUTEROL SULFATE 1 DOSE: 2.5; .5 SOLUTION RESPIRATORY (INHALATION) at 13:00

## 2024-01-14 RX ADMIN — CEFEPIME 2000 MG: 2 INJECTION, POWDER, FOR SOLUTION INTRAVENOUS at 15:02

## 2024-01-14 RX ADMIN — DOXYCYCLINE 100 MG: 100 INJECTION, POWDER, LYOPHILIZED, FOR SOLUTION INTRAVENOUS at 13:50

## 2024-01-14 RX ADMIN — DEXAMETHASONE SODIUM PHOSPHATE 10 MG: 10 INJECTION INTRAMUSCULAR; INTRAVENOUS at 13:14

## 2024-01-14 RX ADMIN — IPRATROPIUM BROMIDE AND ALBUTEROL SULFATE 1 DOSE: 2.5; .5 SOLUTION RESPIRATORY (INHALATION) at 13:19

## 2024-01-14 RX ADMIN — IOPAMIDOL 75 ML: 755 INJECTION, SOLUTION INTRAVENOUS at 14:01

## 2024-01-14 ASSESSMENT — PAIN - FUNCTIONAL ASSESSMENT: PAIN_FUNCTIONAL_ASSESSMENT: NONE - DENIES PAIN

## 2024-01-14 ASSESSMENT — PAIN SCALES - GENERAL: PAINLEVEL_OUTOF10: 0

## 2024-01-14 NOTE — ED PROVIDER NOTES
[facility] EMERGENCY DEPARTMENT  EMERGENCY DEPARTMENT ENCOUNTER        Pt Name: Jason Cox  MRN: [id number]  Birthdate 1950  Date of evaluation: 1/14/2024  Provider: Austin Rivas DO  PCP: Vinny Issa MD  Note Started: 12:42 PM EST 1/14/24    CHIEF COMPLAINT       Chief Complaint   Patient presents with    Shortness of Breath     64% on 5L (home o2)    Tachycardia    Fever    Cough       HISTORY OF PRESENT ILLNESS: 1 or more Elements     History from : Patient    Limitations to history : None    Jason Cox is a 73 y.o. male who has history of COPD history of MAC f/w ID, recent history of pseudomonas pnam tobacco abuse smoking 3ppd, history of pulmonary hypertension diastolic heart failure and O2 dependence.  Patient is usually on 3 to 4 L he has turned himself up to 7-1/2 at home.  He has had cough congestion fever tachycardia and worsening shortness of breath at home.  Patient was found to be 64% pulse ox on 5 L on arrival.  Patient was immediately taken back to treatment area and placed on BiPAP. Pulm is anthony little, pcp dr issa    Nursing Notes were all reviewed and agreed with or any disagreements were addressed in the HPI.    REVIEW OF SYSTEMS :      Review of Systems   Unable to perform ROS: Acuity of condition       Positives and Pertinent negatives as per HPI.     SURGICAL HISTORY     Past Surgical History:   Procedure Laterality Date    ABSCESS DRAINAGE  2006    X2 RECTAL ABSCESS    BRONCHOSCOPY N/A 06/12/2019    BRONCHOSCOPY BRUSHINGS performed by Rolf Little MD at [facility] ENDOSCOPY    BRONCHOSCOPY N/A 10/07/2019    BRONCHOSCOPY DIAGNOSTIC OR CELL WASH ONLY performed by Rolf Little MD at [facility] ENDOSCOPY    BRONCHOSCOPY N/A 04/12/2021    BRONCHOSCOPY performed by Rolf Little MD at [facility] OR    COLONOSCOPY  11/18/2013    COLONOSCOPY N/A 5/18/2023    COLONOSCOPY POLYPECTOMY SNARE/COLD BIOPSY performed by Frankie Portillo MD at [facility] ENDOSCOPY

## 2024-01-15 PROBLEM — E86.0 DEHYDRATION: Status: ACTIVE | Noted: 2024-01-15

## 2024-01-15 PROBLEM — J96.01 ACUTE RESPIRATORY FAILURE WITH HYPOXIA (HCC): Status: ACTIVE | Noted: 2024-01-15

## 2024-01-15 PROBLEM — B34.8 PARAINFLUENZA INFECTION: Status: ACTIVE | Noted: 2024-01-15

## 2024-01-15 LAB
ALBUMIN SERPL-MCNC: 3.9 G/DL (ref 3.5–5.2)
ALP SERPL-CCNC: 63 U/L (ref 40–129)
ALT SERPL-CCNC: 11 U/L (ref 0–40)
ANION GAP SERPL CALCULATED.3IONS-SCNC: 11 MMOL/L (ref 7–16)
AST SERPL-CCNC: 19 U/L (ref 0–39)
BASOPHILS # BLD: 0.01 K/UL (ref 0–0.2)
BASOPHILS NFR BLD: 0 % (ref 0–2)
BILIRUB SERPL-MCNC: 0.7 MG/DL (ref 0–1.2)
BUN SERPL-MCNC: 10 MG/DL (ref 6–23)
CALCIUM SERPL-MCNC: 9.6 MG/DL (ref 8.6–10.2)
CHLORIDE SERPL-SCNC: 104 MMOL/L (ref 98–107)
CO2 SERPL-SCNC: 23 MMOL/L (ref 22–29)
CREAT SERPL-MCNC: 0.6 MG/DL (ref 0.7–1.2)
EKG ATRIAL RATE: 92 BPM
EKG P AXIS: 68 DEGREES
EKG P-R INTERVAL: 190 MS
EKG Q-T INTERVAL: 368 MS
EKG QRS DURATION: 84 MS
EKG QTC CALCULATION (BAZETT): 455 MS
EKG R AXIS: 69 DEGREES
EKG T AXIS: 50 DEGREES
EKG VENTRICULAR RATE: 92 BPM
EOSINOPHIL # BLD: 0 K/UL (ref 0.05–0.5)
EOSINOPHILS RELATIVE PERCENT: 0 % (ref 0–6)
ERYTHROCYTE [DISTWIDTH] IN BLOOD BY AUTOMATED COUNT: 14.3 % (ref 11.5–15)
GFR SERPL CREATININE-BSD FRML MDRD: >60 ML/MIN/1.73M2
GLUCOSE SERPL-MCNC: 98 MG/DL (ref 74–99)
HCT VFR BLD AUTO: 49.5 % (ref 37–54)
HGB BLD-MCNC: 17 G/DL (ref 12.5–16.5)
IMM GRANULOCYTES # BLD AUTO: 0.03 K/UL (ref 0–0.58)
IMM GRANULOCYTES NFR BLD: 0 % (ref 0–5)
LYMPHOCYTES NFR BLD: 0.5 K/UL (ref 1.5–4)
LYMPHOCYTES RELATIVE PERCENT: 6 % (ref 20–42)
MCH RBC QN AUTO: 30.5 PG (ref 26–35)
MCHC RBC AUTO-ENTMCNC: 34.3 G/DL (ref 32–34.5)
MCV RBC AUTO: 88.7 FL (ref 80–99.9)
MONOCYTES NFR BLD: 0.68 K/UL (ref 0.1–0.95)
MONOCYTES NFR BLD: 8 % (ref 2–12)
NEUTROPHILS NFR BLD: 86 % (ref 43–80)
NEUTS SEG NFR BLD: 7.23 K/UL (ref 1.8–7.3)
PLATELET # BLD AUTO: 144 K/UL (ref 130–450)
PMV BLD AUTO: 10.3 FL (ref 7–12)
POTASSIUM SERPL-SCNC: 3.9 MMOL/L (ref 3.5–5)
PROT SERPL-MCNC: 6.4 G/DL (ref 6.4–8.3)
RBC # BLD AUTO: 5.58 M/UL (ref 3.8–5.8)
RBC # BLD: NORMAL 10*6/UL
SODIUM SERPL-SCNC: 138 MMOL/L (ref 132–146)
WBC OTHER # BLD: 8.5 K/UL (ref 4.5–11.5)

## 2024-01-15 PROCEDURE — 94664 DEMO&/EVAL PT USE INHALER: CPT

## 2024-01-15 PROCEDURE — 93010 ELECTROCARDIOGRAM REPORT: CPT | Performed by: INTERNAL MEDICINE

## 2024-01-15 PROCEDURE — 87324 CLOSTRIDIUM AG IA: CPT

## 2024-01-15 PROCEDURE — 85025 COMPLETE CBC W/AUTO DIFF WBC: CPT

## 2024-01-15 PROCEDURE — 2580000003 HC RX 258: Performed by: INTERNAL MEDICINE

## 2024-01-15 PROCEDURE — 6370000000 HC RX 637 (ALT 250 FOR IP): Performed by: STUDENT IN AN ORGANIZED HEALTH CARE EDUCATION/TRAINING PROGRAM

## 2024-01-15 PROCEDURE — 94640 AIRWAY INHALATION TREATMENT: CPT

## 2024-01-15 PROCEDURE — 36415 COLL VENOUS BLD VENIPUNCTURE: CPT

## 2024-01-15 PROCEDURE — 2060000000 HC ICU INTERMEDIATE R&B

## 2024-01-15 PROCEDURE — 80053 COMPREHEN METABOLIC PANEL: CPT

## 2024-01-15 PROCEDURE — 2700000000 HC OXYGEN THERAPY PER DAY

## 2024-01-15 PROCEDURE — 2500000003 HC RX 250 WO HCPCS: Performed by: STUDENT IN AN ORGANIZED HEALTH CARE EDUCATION/TRAINING PROGRAM

## 2024-01-15 PROCEDURE — 2580000003 HC RX 258: Performed by: STUDENT IN AN ORGANIZED HEALTH CARE EDUCATION/TRAINING PROGRAM

## 2024-01-15 PROCEDURE — 6360000002 HC RX W HCPCS: Performed by: STUDENT IN AN ORGANIZED HEALTH CARE EDUCATION/TRAINING PROGRAM

## 2024-01-15 PROCEDURE — 87449 NOS EACH ORGANISM AG IA: CPT

## 2024-01-15 PROCEDURE — 2580000003 HC RX 258

## 2024-01-15 PROCEDURE — 99233 SBSQ HOSP IP/OBS HIGH 50: CPT | Performed by: INTERNAL MEDICINE

## 2024-01-15 RX ORDER — WATER 10 ML/10ML
INJECTION INTRAMUSCULAR; INTRAVENOUS; SUBCUTANEOUS
Status: COMPLETED
Start: 2024-01-15 | End: 2024-01-15

## 2024-01-15 RX ORDER — SODIUM CHLORIDE 0.9 % (FLUSH) 0.9 %
5-40 SYRINGE (ML) INJECTION PRN
Status: DISCONTINUED | OUTPATIENT
Start: 2024-01-15 | End: 2024-01-25 | Stop reason: HOSPADM

## 2024-01-15 RX ORDER — FAMOTIDINE 20 MG/1
20 TABLET, FILM COATED ORAL 2 TIMES DAILY
Status: DISCONTINUED | OUTPATIENT
Start: 2024-01-15 | End: 2024-01-25 | Stop reason: HOSPADM

## 2024-01-15 RX ORDER — MAGNESIUM SULFATE IN WATER 40 MG/ML
2000 INJECTION, SOLUTION INTRAVENOUS PRN
Status: DISCONTINUED | OUTPATIENT
Start: 2024-01-15 | End: 2024-01-25 | Stop reason: HOSPADM

## 2024-01-15 RX ORDER — OXYCODONE HYDROCHLORIDE AND ACETAMINOPHEN 5; 325 MG/1; MG/1
1 TABLET ORAL 2 TIMES DAILY PRN
Status: DISCONTINUED | OUTPATIENT
Start: 2024-01-15 | End: 2024-01-25 | Stop reason: HOSPADM

## 2024-01-15 RX ORDER — BUMETANIDE 1 MG/1
1 TABLET ORAL
Status: DISCONTINUED | OUTPATIENT
Start: 2024-01-15 | End: 2024-01-25 | Stop reason: HOSPADM

## 2024-01-15 RX ORDER — ENOXAPARIN SODIUM 100 MG/ML
40 INJECTION SUBCUTANEOUS DAILY
Status: DISCONTINUED | OUTPATIENT
Start: 2024-01-15 | End: 2024-01-25 | Stop reason: HOSPADM

## 2024-01-15 RX ORDER — METHYLPREDNISOLONE SODIUM SUCCINATE 40 MG/ML
40 INJECTION, POWDER, LYOPHILIZED, FOR SOLUTION INTRAMUSCULAR; INTRAVENOUS EVERY 12 HOURS
Status: DISCONTINUED | OUTPATIENT
Start: 2024-01-15 | End: 2024-01-18

## 2024-01-15 RX ORDER — ACETAMINOPHEN 650 MG/1
650 SUPPOSITORY RECTAL EVERY 6 HOURS PRN
Status: DISCONTINUED | OUTPATIENT
Start: 2024-01-15 | End: 2024-01-25 | Stop reason: HOSPADM

## 2024-01-15 RX ORDER — IPRATROPIUM BROMIDE AND ALBUTEROL SULFATE 2.5; .5 MG/3ML; MG/3ML
1 SOLUTION RESPIRATORY (INHALATION)
Status: DISCONTINUED | OUTPATIENT
Start: 2024-01-15 | End: 2024-01-25 | Stop reason: HOSPADM

## 2024-01-15 RX ORDER — SODIUM CHLORIDE 9 MG/ML
INJECTION, SOLUTION INTRAVENOUS CONTINUOUS
Status: DISCONTINUED | OUTPATIENT
Start: 2024-01-15 | End: 2024-01-17

## 2024-01-15 RX ORDER — ACETAMINOPHEN 325 MG/1
650 TABLET ORAL EVERY 6 HOURS PRN
Status: DISCONTINUED | OUTPATIENT
Start: 2024-01-15 | End: 2024-01-25 | Stop reason: HOSPADM

## 2024-01-15 RX ORDER — POTASSIUM CHLORIDE 20 MEQ/1
40 TABLET, EXTENDED RELEASE ORAL PRN
Status: DISCONTINUED | OUTPATIENT
Start: 2024-01-15 | End: 2024-01-25 | Stop reason: HOSPADM

## 2024-01-15 RX ORDER — OXYCODONE HYDROCHLORIDE AND ACETAMINOPHEN 5; 325 MG/1; MG/1
1 TABLET ORAL 2 TIMES DAILY PRN
Status: DISCONTINUED | OUTPATIENT
Start: 2024-01-15 | End: 2024-01-15

## 2024-01-15 RX ORDER — POTASSIUM CHLORIDE 7.45 MG/ML
10 INJECTION INTRAVENOUS PRN
Status: DISCONTINUED | OUTPATIENT
Start: 2024-01-15 | End: 2024-01-25 | Stop reason: HOSPADM

## 2024-01-15 RX ORDER — OXYCODONE HYDROCHLORIDE 5 MG/1
2.5 TABLET ORAL 2 TIMES DAILY PRN
Status: DISCONTINUED | OUTPATIENT
Start: 2024-01-15 | End: 2024-01-15

## 2024-01-15 RX ORDER — ONDANSETRON 2 MG/ML
4 INJECTION INTRAMUSCULAR; INTRAVENOUS EVERY 6 HOURS PRN
Status: DISCONTINUED | OUTPATIENT
Start: 2024-01-15 | End: 2024-01-25 | Stop reason: HOSPADM

## 2024-01-15 RX ORDER — POLYETHYLENE GLYCOL 3350 17 G/17G
17 POWDER, FOR SOLUTION ORAL DAILY PRN
Status: DISCONTINUED | OUTPATIENT
Start: 2024-01-15 | End: 2024-01-25 | Stop reason: HOSPADM

## 2024-01-15 RX ORDER — SODIUM CHLORIDE 0.9 % (FLUSH) 0.9 %
5-40 SYRINGE (ML) INJECTION EVERY 12 HOURS SCHEDULED
Status: DISCONTINUED | OUTPATIENT
Start: 2024-01-15 | End: 2024-01-25 | Stop reason: HOSPADM

## 2024-01-15 RX ORDER — ONDANSETRON 4 MG/1
4 TABLET, ORALLY DISINTEGRATING ORAL EVERY 8 HOURS PRN
Status: DISCONTINUED | OUTPATIENT
Start: 2024-01-15 | End: 2024-01-25 | Stop reason: HOSPADM

## 2024-01-15 RX ORDER — OXYCODONE HYDROCHLORIDE 5 MG/1
2.5 TABLET ORAL 2 TIMES DAILY PRN
Status: DISCONTINUED | OUTPATIENT
Start: 2024-01-15 | End: 2024-01-25 | Stop reason: HOSPADM

## 2024-01-15 RX ORDER — SODIUM CHLORIDE 9 MG/ML
INJECTION, SOLUTION INTRAVENOUS PRN
Status: DISCONTINUED | OUTPATIENT
Start: 2024-01-15 | End: 2024-01-25 | Stop reason: HOSPADM

## 2024-01-15 RX ORDER — OXYCODONE AND ACETAMINOPHEN 7.5; 325 MG/1; MG/1
1 TABLET ORAL 2 TIMES DAILY PRN
Status: DISCONTINUED | OUTPATIENT
Start: 2024-01-15 | End: 2024-01-15 | Stop reason: CLARIF

## 2024-01-15 RX ORDER — AZITHROMYCIN 250 MG/1
500 TABLET, FILM COATED ORAL
Status: DISCONTINUED | OUTPATIENT
Start: 2024-01-15 | End: 2024-01-25 | Stop reason: HOSPADM

## 2024-01-15 RX ADMIN — DOXYCYCLINE 100 MG: 100 INJECTION, POWDER, LYOPHILIZED, FOR SOLUTION INTRAVENOUS at 15:46

## 2024-01-15 RX ADMIN — METHYLPREDNISOLONE SODIUM SUCCINATE 40 MG: 40 INJECTION, POWDER, LYOPHILIZED, FOR SOLUTION INTRAMUSCULAR; INTRAVENOUS at 08:45

## 2024-01-15 RX ADMIN — AZITHROMYCIN 500 MG: 250 TABLET, FILM COATED ORAL at 08:50

## 2024-01-15 RX ADMIN — FAMOTIDINE 20 MG: 20 TABLET ORAL at 19:47

## 2024-01-15 RX ADMIN — ENOXAPARIN SODIUM 40 MG: 100 INJECTION SUBCUTANEOUS at 08:45

## 2024-01-15 RX ADMIN — SODIUM CHLORIDE, PRESERVATIVE FREE 10 ML: 5 INJECTION INTRAVENOUS at 19:47

## 2024-01-15 RX ADMIN — SODIUM CHLORIDE, PRESERVATIVE FREE 10 ML: 5 INJECTION INTRAVENOUS at 08:45

## 2024-01-15 RX ADMIN — WATER 10 ML: 1 INJECTION INTRAMUSCULAR; INTRAVENOUS; SUBCUTANEOUS at 19:47

## 2024-01-15 RX ADMIN — IPRATROPIUM BROMIDE AND ALBUTEROL SULFATE 1 DOSE: .5; 2.5 SOLUTION RESPIRATORY (INHALATION) at 21:26

## 2024-01-15 RX ADMIN — DOXYCYCLINE 100 MG: 100 INJECTION, POWDER, LYOPHILIZED, FOR SOLUTION INTRAVENOUS at 02:46

## 2024-01-15 RX ADMIN — IPRATROPIUM BROMIDE AND ALBUTEROL SULFATE 1 DOSE: .5; 2.5 SOLUTION RESPIRATORY (INHALATION) at 08:55

## 2024-01-15 RX ADMIN — FAMOTIDINE 20 MG: 20 TABLET ORAL at 08:45

## 2024-01-15 RX ADMIN — BUMETANIDE 1 MG: 1 TABLET ORAL at 08:45

## 2024-01-15 RX ADMIN — METHYLPREDNISOLONE SODIUM SUCCINATE 40 MG: 40 INJECTION, POWDER, LYOPHILIZED, FOR SOLUTION INTRAMUSCULAR; INTRAVENOUS at 19:47

## 2024-01-15 RX ADMIN — SODIUM CHLORIDE: 9 INJECTION, SOLUTION INTRAVENOUS at 17:06

## 2024-01-15 RX ADMIN — CEFEPIME 2000 MG: 2 INJECTION, POWDER, FOR SOLUTION INTRAVENOUS at 04:23

## 2024-01-15 RX ADMIN — IPRATROPIUM BROMIDE AND ALBUTEROL SULFATE 1 DOSE: .5; 2.5 SOLUTION RESPIRATORY (INHALATION) at 12:42

## 2024-01-15 ASSESSMENT — PAIN SCALES - GENERAL
PAINLEVEL_OUTOF10: 0
PAINLEVEL_OUTOF10: 0

## 2024-01-15 NOTE — PROGRESS NOTES
Patient refusing bipap, on 15L of high flow oxygen. He was educated on the importance of it by myself and another RN but he continues to refuse.

## 2024-01-15 NOTE — PROGRESS NOTES
[facility] Quality Flow/Interdisciplinary Rounds Progress Note        Quality Flow Rounds held on January 15, 2024    Disciplines Attending:  Bedside Nurse, , , and Nursing Unit Leadership    Jason Cox was admitted on 1/14/2024 11:39 PM    Anticipated Discharge Date:  Expected Discharge Date: 01/17/24    Disposition:    Sandro Score:  Sandro Scale Score: 19    Readmission Risk              Risk of Unplanned Readmission:  16           Discussed patient goal for the day, patient clinical progression, and barriers to discharge.  The following Goal(s) of the Day/Commitment(s) have been identified:   Wean 02 as tolerated, Pulmonology Consult       Eliana Posadas RN  January 15, 2024

## 2024-01-15 NOTE — H&P
[facility] Hospitalist [facility] History and Physical      CHIEF COMPLAINT: Shortness of breath    History of Present Illness: 73-year-old gentleman with past medical history significant for COPD pulmonary hypertension disseminated Mycobacterium infection and heart failure with preserved ejection fraction presented to ED with worsening shortness of breath.  Patient uses oxygen at home at 3 to 4 L but due to recent shortness of breath he had to turn it up to 7 L.  This was associated with cough productive of clear sputum, generalized weakness and tiredness also he spiked fever of 104 °F yesterday at home.  Patient arrived in ED he was found to 64% pulse ox at 5 L.  Breathing treatments were provided and he was put on BiPAP and transferred to [facility] for further management.  Denies any chest pain, belly pain, nausea vomiting diarrhea, lightheadedness or dizziness, focal neurological signs.  No fever or chills.    Informant(s) for H&P: Patient    REVIEW OF SYSTEMS:  A comprehensive review of systems was negative except for: what is in the HPI      PMH:  Past Medical History:   Diagnosis Date    Acute and chronic respiratory failure with hypoxia ([facility]) 10/12/2017    chronic oxygen use    Acute exacerbation of chronic obstructive pulmonary disease (COPD) ([facility]) 8/7/2023    Acute heart failure with preserved ejection fraction ([facility]) 04/08/2021    follows with PCP    Acute respiratory disease due to severe acute respiratory syndrome coronavirus 2 (SARS-CoV-2) 01/01/2021    Acute respiratory failure with hypoxia ([facility]) 11/12/2018    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    Chronic back pain     Degeneration of umbar intervertebral disc    Colon cancer screening     COPD (chronic obstructive pulmonary disease) ([facility])     Coronavirus infection 02/02/2022    Coronavirus 229E    Cough with hemoptysis 04/23/2019    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        Radiology:   CTA PULMONARY W CONTRAST   Final Result   1. No acute pulmonary artery embolism.  Continued prominence of the central   pulmonary arterial tree and pruning consistent of a pulmonary arterial   hypertension configuration.   2. Severe emphysema with central bronchiectasis.   3. Trace interstitial edema pattern.   4. No pleural effusion.  Asymmetric elevation the right hemidiaphragm   corresponds to findings on chest x-ray with a prominent pericardial fat pad   and prominence of the right diaphragm.      RECOMMENDATIONS:   Continued annual low-dose lung screening given high risk findings of COPD         XR CHEST PORTABLE   Final Result   Findings suggestive of mild interstitial edema pattern with small to moderate   right pleural effusion.             EKG: Normal sinus rhythm    ASSESSMENT:      Principal Problem:    COPD exacerbation (HCC)  Resolved Problems:    * No resolved hospital problems. *      PLAN:    1.  Acute exacerbation of COPD-most likely due to parainfluenza infection, symptomatic treatment, breathing treatments every 4 hours, steroids, BiPAP right now, wean off as tolerated, labs in morning, will follow.  2.  Acute hypoxic respiratory failure-secondary to 1 and 3, treatment as per 1, he was also started on antibiotics cefepime and Doxy, will continue for now, blood cultures sent, results awaited, he did not spike any fever since yesterday, might need to stepdown on antibiotics if he does not spike fever  4 hours.  3.  Parainfluenza-symptomatic treatment as per 1.  4.  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      Code Status: Full code  DVT prophylaxis:

## 2024-01-15 NOTE — ACP (ADVANCE CARE PLANNING)
Advance Care Planning   Healthcare Decision Maker:    Primary Decision Maker: Tara Cox - [facility] - [phone]      Today we documented Decision Maker(s) consistent with Legal Next of Kin hierarchy.

## 2024-01-15 NOTE — PLAN OF CARE
Problem: Safety - Adult  Goal: Free from fall injury  Outcome: Progressing     Problem: Chronic Conditions and Co-morbidities  Goal: Patient's chronic conditions and co-morbidity symptoms are monitored and maintained or improved  Outcome: Progressing

## 2024-01-15 NOTE — CONSULTS
Pulmonary Consultation    Admit Date: 1/14/2024  Requesting Physician: Vinny Issa MD    Reason for consultation:  Exacerbation COPD  HPI:  The patient is a 73-year-old male with end-stage chronic obstructive pulmonary disease known to our practice from both the inpatient and outpatient setting.  More recently, he was seen in the office doing reasonably well.  He was sent for second opinion to the [facility] regarding lung volume reduction surgery/valve placement/transplantation and because of his ongoing infections with Mycobacterium avium complex and Pseudomonas, no interventions were declined.    Patient presents now with a several day history of increasing shortness of breath.  He denies any sick contacts but did test positive for parainfluenza 4.  There is been no fever, chills or night sweats.  Is been no weight loss or hemoptysis.    PMH:    Past Medical History:   Diagnosis Date    Acute and chronic respiratory failure with hypoxia ([facility]) 10/12/2017    chronic oxygen use    Acute exacerbation of chronic obstructive pulmonary disease (COPD) ([facility]) 8/7/2023    Acute heart failure with preserved ejection fraction ([facility]) 04/08/2021    follows with PCP    Acute respiratory disease due to severe acute respiratory syndrome coronavirus 2 (SARS-CoV-2) 01/01/2021    Acute respiratory failure with hypoxia ([facility]) 11/12/2018    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    Chronic back pain     Degeneration of umbar intervertebral disc    Colon cancer screening     COPD (chronic obstructive pulmonary disease) ([facility])     Coronavirus infection 02/02/2022    Coronavirus 229E    Cough with hemoptysis 04/23/2019    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

## 2024-01-15 NOTE — PLAN OF CARE
Patient's chart updated to reflect:      .    - HF care plan, HF education points and HF discharge instructions.  -Orders: daily weights, I/O.  -PCP and/or Cardiologist appointment to be scheduled within 7 days of hospital discharge.  -History of HF, not primary admission Dx.  Patient admitted for treatment of acute hypoxia respiratory failure.    Alysha Ray RN BSN  Heart Failure Navigator

## 2024-01-15 NOTE — PROGRESS NOTES
[facility]    Admitting Date and Time: 1/14/2024 11:39 PM  Admit Dx: Acute hypoxic respiratory failure (HCC) [J96.01]  COPD exacerbation (HCC) [J44.1]    Subjective:    Patient was admitted with Acute hypoxic respiratory failure (HCC) [J96.01]  COPD exacerbation (HCC) [J44.1]. Patient denies fever, chills, cp, sob, n/v.     sodium chloride flush  5-40 mL IntraVENous 2 times per day    enoxaparin  40 mg SubCUTAneous Daily    famotidine  20 mg Oral BID    azithromycin  500 mg Oral Once per day on Mon Wed Fri    bumetanide  1 mg Oral Once per day on Mon Wed Fri    ipratropium 0.5 mg-albuterol 2.5 mg  1 Dose Inhalation Q4H WA RT    methylPREDNISolone  40 mg IntraVENous Q12H    doxycycline (VIBRAMYCIN) IV  100 mg IntraVENous Q12H    cefepime  2,000 mg IntraVENous Q12H     sodium chloride flush, 5-40 mL, PRN  sodium chloride, , PRN  potassium chloride, 40 mEq, PRN   Or  potassium alternative oral replacement, 40 mEq, PRN   Or  potassium chloride, 10 mEq, PRN  magnesium sulfate, 2,000 mg, PRN  ondansetron, 4 mg, Q8H PRN   Or  ondansetron, 4 mg, Q6H PRN  polyethylene glycol, 17 g, Daily PRN  acetaminophen, 650 mg, Q6H PRN   Or  acetaminophen, 650 mg, Q6H PRN  oxyCODONE-acetaminophen, 1 tablet, BID PRN   And  oxyCODONE, 2.5 mg, BID PRN         Objective:          PHYSICAL EXAM:    Vitals:  /64   Pulse 80   Temp 98 °F (36.7 °C) (Oral)   Resp 20   Ht 1.854 m (6' 1\")   Wt 85.6 kg (188 lb 11.4 oz)   SpO2 93%   BMI 24.90 kg/m²     General:  Appears comfortable. Answers questions appropriately and cooperative with exam  HEENT:  Mucous membranes moist. No erythema, rhinorrhea, or post-nasal drip noted.  Neck:  No carotid bruits.  Heart:  Rhythm regular at rate of 84  Lungs:  CTA.  No wheeze, rales, or rhonchi  Abdomen:  Positive bowel sounds positive.  Soft.  Non-tender. No guarding, rebound or rigidity.  Breast/Rectal/Genitourinary: not pertinent.    Extremities:  Negative  for lower extremity edema  Skin:  Warm and dry  Vascular: 2/4 Dorsalis Pedis pulses bilaterally.  Neuro:  Cranial nerves 2-12 grossly intact, no focal weakness or change in sensation noted.  Extraocular muscles intact.  Pupils equal, round, reactive to light.          Recent Labs     01/14/24  1245 01/15/24  0723    138   K 4.1 3.9    104   CO2 28 23   BUN 9 10   CREATININE 0.9 0.6*   GLUCOSE 110* 98   CALCIUM 9.9 9.6       Recent Labs     01/14/24  1245 01/15/24  0723   WBC 4.8 8.5   RBC 6.02* 5.58   HGB 18.0* 17.0*   HCT 53.6 49.5   MCV 89.0 88.7   MCH 29.9 30.5   MCHC 33.6 34.3   RDW 14.4 14.3    144   MPV 10.4 10.3       CBC with Differential:    Lab Results   Component Value Date/Time    WBC 8.5 01/15/2024 07:23 AM    RBC 5.58 01/15/2024 07:23 AM    HGB 17.0 01/15/2024 07:23 AM    HCT 49.5 01/15/2024 07:23 AM     01/15/2024 07:23 AM    MCV 88.7 01/15/2024 07:23 AM    MCH 30.5 01/15/2024 07:23 AM    MCHC 34.3 01/15/2024 07:23 AM    RDW 14.3 01/15/2024 07:23 AM    NRBC 0.0 06/14/2023 04:16 AM    METASPCT 0.9 04/13/2022 07:01 AM    LYMPHOPCT 6 01/15/2024 07:23 AM    MONOPCT 8 01/15/2024 07:23 AM    MYELOPCT 0.9 04/07/2021 10:35 AM    BASOPCT 0 01/15/2024 07:23 AM    MONOSABS 0.68 01/15/2024 07:23 AM    LYMPHSABS 0.50 01/15/2024 07:23 AM    EOSABS 0.00 01/15/2024 07:23 AM    BASOSABS 0.01 01/15/2024 07:23 AM     CMP:    Lab Results   Component Value Date/Time     01/15/2024 07:23 AM    K 3.9 01/15/2024 07:23 AM    K 4.4 11/18/2022 11:23 AM     01/15/2024 07:23 AM    CO2 23 01/15/2024 07:23 AM    BUN 10 01/15/2024 07:23 AM    CREATININE 0.6 01/15/2024 07:23 AM    GFRAA >60 08/01/2022 03:55 PM    LABGLOM >60 01/15/2024 07:23 AM    GLUCOSE 98 01/15/2024 07:23 AM    PROT 6.4 01/15/2024 07:23 AM    LABALBU 3.9 01/15/2024 07:23 AM    CALCIUM 9.6 01/15/2024 07:23 AM    BILITOT 0.7 01/15/2024 07:23 AM    ALKPHOS 63 01/15/2024 07:23 AM    AST 19 01/15/2024 07:23 AM    ALT 11 01/15/2024

## 2024-01-15 NOTE — DISCHARGE INSTRUCTIONS
HEART FAILURE  / CONGESTIVE HEART FAILURE  DISCHARGE INSTRUCTIONS:  GUIDELINES TO FOLLOW AT HOME    Self- Managed Care:     MEDICATIONS:  Take your medication as directed. If you are experiencing any side effects, inform your doctor, Do not stop taking any of your medications without letting your doctor know.   Check with your doctor before taking any over-the-counter medications / herbal / or dietary supplements. They may interfere with your other medications.  Do not take ibuprofen (Advil or Motrin) and naproxen (Aleve) without talking to your doctor first. They could make your heart failure worse.         WEIGHT MONITORING:   Weigh yourself everyday (with the same scale) around the same time of the day and write it down. (you can chart them on a calendar or keep track of them on paper.   Notify your doctor of a weight gain of 3 pounds or more in 1 day   OR a total of 5 pounds or more in 1 week    Take your weight record to your doctor visits  Also, the same goes if you loose more than 3# in one day, let your heart doctor know.         DIET:   Cardiac heart healthy diet- Low saturated / low trans fat, no added salt, caffeine restricted, Low sodium diet-   No more than 2,000mg (2 grams) of salt / sodium per day (which equals to a little less than  a teaspoon of salt)  If your doctor wants you on a fluid restriction...it is usually recommended a fluid limit of 2,000cc -  Fluid restriction- 2,000 ml (milliliters) = 64 ounces = you can have 8 glasses of fluid per day (each glass 8 ounces)    Follow a low salt diet - avoid using salt at the table, avoid / limit use of canned soups, processed / packaged foods, salted snacks, olives and pickles.  Do not use a salt substitute without checking with your doctor, they may contain a high amount of potassioum. (Mrs. Dash is safe to use).    Limit the use of alcohol       CALL YOUR DOCTOR THE FIRST DAY YOU NOTICE ANY OF THESE   SYMPTOMS:  You have a  doctor whether you need another dose.       My Goal for Self-management of Heart Failure Includes 5 steps :    1. Notice a change in symptoms ( weight gain, short of breath, leg swelling, decreased activity level, bloating....)    2. Evaluate the change: (use the Heart Failure Zones )     3. Decide to take action: decide what your options are, such as: (call your doctor for an extra visit, take a prescribed medication, such as your water pill if your doctor has given you directions to do so, CALL YOUR DOCTOR)    4. Come up with a strategy:  (now you call the doctor for advice / appointment. This is where you take action!!!  Do not wait, catch the symptom early and treat it before it worsens.    5. Evaluate the response: The next day, check your Heart Failure Zones: are you in the GREEN ZONE (safe zone)?  Worsening symptoms of YELLOW ZONE? Or have you moved to the RED ZONE and need to call 911 or go to the Emergency Room for evaluation? Call your doctor's office to update them on your symptoms of heart failure.

## 2024-01-15 NOTE — CARE COORDINATION
Acute COPD exac w/parainfluenza on IV solumedrol and azithromax po. CM made in room visit to pt w/role of CM explained. Pt states he resides in a one story home w/two steps to enter and is independent w/o the use of any assistive devices, but has both a cane and walker if needed. Pt is established w/Dr. Issa and uses [facility] on [address]. He states he has home O2 4.5l as needed during the day and continuously HS via [employer]. He has an inogen and concentrator at home that goes up to 10l and has a nebulizer. Pt states he will only use the inogen and not use a portable tank if greater than 5l l is needed. No hx of HHC or SNF stay. Plan will be for pt to return home. Will follow.  ERICK ColonN, RN  [facility] Case Management  [phone]

## 2024-01-16 LAB
C DIFF GDH + TOXINS A+B STL QL IA.RAPID: NEGATIVE
SPECIMEN DESCRIPTION: NORMAL

## 2024-01-16 PROCEDURE — 2700000000 HC OXYGEN THERAPY PER DAY

## 2024-01-16 PROCEDURE — 94669 MECHANICAL CHEST WALL OSCILL: CPT

## 2024-01-16 PROCEDURE — 99232 SBSQ HOSP IP/OBS MODERATE 35: CPT | Performed by: INTERNAL MEDICINE

## 2024-01-16 PROCEDURE — 2580000003 HC RX 258: Performed by: STUDENT IN AN ORGANIZED HEALTH CARE EDUCATION/TRAINING PROGRAM

## 2024-01-16 PROCEDURE — 94640 AIRWAY INHALATION TREATMENT: CPT

## 2024-01-16 PROCEDURE — 6360000002 HC RX W HCPCS

## 2024-01-16 PROCEDURE — 2580000003 HC RX 258: Performed by: INTERNAL MEDICINE

## 2024-01-16 PROCEDURE — 6370000000 HC RX 637 (ALT 250 FOR IP): Performed by: STUDENT IN AN ORGANIZED HEALTH CARE EDUCATION/TRAINING PROGRAM

## 2024-01-16 PROCEDURE — 36415 COLL VENOUS BLD VENIPUNCTURE: CPT

## 2024-01-16 PROCEDURE — 6360000002 HC RX W HCPCS: Performed by: STUDENT IN AN ORGANIZED HEALTH CARE EDUCATION/TRAINING PROGRAM

## 2024-01-16 PROCEDURE — 2060000000 HC ICU INTERMEDIATE R&B

## 2024-01-16 RX ORDER — ARFORMOTEROL TARTRATE 15 UG/2ML
15 SOLUTION RESPIRATORY (INHALATION)
Status: DISCONTINUED | OUTPATIENT
Start: 2024-01-16 | End: 2024-01-25 | Stop reason: HOSPADM

## 2024-01-16 RX ORDER — BUDESONIDE 0.5 MG/2ML
1000 INHALANT ORAL
Status: DISCONTINUED | OUTPATIENT
Start: 2024-01-16 | End: 2024-01-25 | Stop reason: HOSPADM

## 2024-01-16 RX ADMIN — BUDESONIDE 1000 MCG: 0.5 SUSPENSION RESPIRATORY (INHALATION) at 19:37

## 2024-01-16 RX ADMIN — BUDESONIDE 1000 MCG: 0.5 SUSPENSION RESPIRATORY (INHALATION) at 10:51

## 2024-01-16 RX ADMIN — SODIUM CHLORIDE: 9 INJECTION, SOLUTION INTRAVENOUS at 06:33

## 2024-01-16 RX ADMIN — METHYLPREDNISOLONE SODIUM SUCCINATE 40 MG: 40 INJECTION, POWDER, LYOPHILIZED, FOR SOLUTION INTRAMUSCULAR; INTRAVENOUS at 20:42

## 2024-01-16 RX ADMIN — ARFORMOTEROL TARTRATE 15 MCG: 15 SOLUTION RESPIRATORY (INHALATION) at 19:37

## 2024-01-16 RX ADMIN — ARFORMOTEROL TARTRATE 15 MCG: 15 SOLUTION RESPIRATORY (INHALATION) at 10:51

## 2024-01-16 RX ADMIN — SODIUM CHLORIDE, PRESERVATIVE FREE 10 ML: 5 INJECTION INTRAVENOUS at 20:42

## 2024-01-16 RX ADMIN — ENOXAPARIN SODIUM 40 MG: 100 INJECTION SUBCUTANEOUS at 09:16

## 2024-01-16 RX ADMIN — FAMOTIDINE 20 MG: 20 TABLET ORAL at 09:16

## 2024-01-16 RX ADMIN — SODIUM CHLORIDE, PRESERVATIVE FREE 10 ML: 5 INJECTION INTRAVENOUS at 09:17

## 2024-01-16 RX ADMIN — METHYLPREDNISOLONE SODIUM SUCCINATE 40 MG: 40 INJECTION, POWDER, LYOPHILIZED, FOR SOLUTION INTRAMUSCULAR; INTRAVENOUS at 09:16

## 2024-01-16 RX ADMIN — IPRATROPIUM BROMIDE AND ALBUTEROL SULFATE 1 DOSE: .5; 2.5 SOLUTION RESPIRATORY (INHALATION) at 13:16

## 2024-01-16 RX ADMIN — FAMOTIDINE 20 MG: 20 TABLET ORAL at 20:42

## 2024-01-16 RX ADMIN — IPRATROPIUM BROMIDE AND ALBUTEROL SULFATE 1 DOSE: .5; 2.5 SOLUTION RESPIRATORY (INHALATION) at 09:29

## 2024-01-16 RX ADMIN — IPRATROPIUM BROMIDE AND ALBUTEROL SULFATE 1 DOSE: .5; 2.5 SOLUTION RESPIRATORY (INHALATION) at 16:00

## 2024-01-16 RX ADMIN — IPRATROPIUM BROMIDE AND ALBUTEROL SULFATE 1 DOSE: .5; 2.5 SOLUTION RESPIRATORY (INHALATION) at 19:37

## 2024-01-16 ASSESSMENT — PAIN SCALES - GENERAL: PAINLEVEL_OUTOF10: 0

## 2024-01-16 NOTE — PROGRESS NOTES
Pulmonary Progress Note    Admit Date: 2024  Hospital day                               PCP: Vinny Issa MD    Chief Complaint (s):  Patient Active Problem List   Diagnosis    COPD (chronic obstructive pulmonary disease) (HCC)    Thoracic ascending aortic aneurysm    S/P lumbar fusion    Hypophosphatemia    Glucose intolerance    Hilar adenopathy    Pulmonary Mycobacterium avium complex (MAC) infection (HCC)    Chronic respiratory failure with hypoxia (HCC)    Chronic pain syndrome    Lumbar degenerative disc disease    Chronic low back pain    Immunocompromised (HCC)    Iron deficiency    Pulmonary hypertension (HCC)    Chronic diastolic heart failure (HCC)    COPD exacerbation (HCC)    Acute hypoxic respiratory failure (HCC)    Dehydration    Parainfluenza infection    Acute respiratory failure with hypoxia (HCC)       Subjective:  Patient seen on 10 L high flow nasal cannula. He does have thick brown sputum production. No fevers noted, but did have some chills over night.       Vitals:  VITALS:  /79   Pulse 80   Temp 97.8 °F (36.6 °C) (Oral)   Resp 16   Ht 1.854 m (6' 1\")   Wt 84.3 kg (185 lb 13.6 oz)   SpO2 97%   BMI 24.52 kg/m²     24HR INTAKE/OUTPUT:    No intake or output data in the 24 hours ending 24 1114    24HR PULSE OXIMETRY RANGE:    SpO2  Av.8 %  Min: 90 %  Max: 97 %    Medications:  IV:   sodium chloride      sodium chloride 75 mL/hr at 24 0633       Scheduled Meds:   budesonide  1,000 mcg Nebulization BID RT    arformoterol tartrate  15 mcg Nebulization BID RT    sodium chloride flush  5-40 mL IntraVENous 2 times per day    enoxaparin  40 mg SubCUTAneous Daily    famotidine  20 mg Oral BID    azithromycin  500 mg Oral Once per day on     bumetanide  1 mg Oral Once per day on     ipratropium 0.5 mg-albuterol 2.5 mg  1 Dose Inhalation Q4H WA RT    methylPREDNISolone  40 mg IntraVENous Q12H       Diet:   ADULT DIET; Regular

## 2024-01-16 NOTE — PLAN OF CARE
Problem: Pain  Goal: Verbalizes/displays adequate comfort level or baseline comfort level  Outcome: Progressing     Problem: Safety - Adult  Goal: Free from fall injury  1/16/2024 0145 by Laura Watson RN  Outcome: Progressing  1/15/2024 1250 by Obinna Enamorado, RN  Outcome: Progressing     Problem: Chronic Conditions and Co-morbidities  Goal: Patient's chronic conditions and co-morbidity symptoms are monitored and maintained or improved  1/16/2024 0145 by Laura Watson RN  Outcome: Progressing  1/15/2024 1250 by Obinna Enamorado, RN  Outcome: Progressing

## 2024-01-16 NOTE — PROGRESS NOTES
[facility] Hospital[facility]   Progress Note    Admitting Date and Time: 1/14/2024 11:39 PM  Admit Dx: Acute hypoxic respiratory failure (HCC) [J96.01]  COPD exacerbation (HCC) [J44.1]    Subjective:    Patient was admitted with Acute hypoxic respiratory failure (HCC) [J96.01]  COPD exacerbation (HCC) [J44.1]. Patient denies fever, chills, cp, sob, n/v.     sodium chloride flush  5-40 mL IntraVENous 2 times per day    enoxaparin  40 mg SubCUTAneous Daily    famotidine  20 mg Oral BID    azithromycin  500 mg Oral Once per day on Mon Wed Fri    bumetanide  1 mg Oral Once per day on Mon Wed Fri    ipratropium 0.5 mg-albuterol 2.5 mg  1 Dose Inhalation Q4H WA RT    methylPREDNISolone  40 mg IntraVENous Q12H     sodium chloride flush, 5-40 mL, PRN  sodium chloride, , PRN  potassium chloride, 40 mEq, PRN   Or  potassium alternative oral replacement, 40 mEq, PRN   Or  potassium chloride, 10 mEq, PRN  magnesium sulfate, 2,000 mg, PRN  ondansetron, 4 mg, Q8H PRN   Or  ondansetron, 4 mg, Q6H PRN  polyethylene glycol, 17 g, Daily PRN  acetaminophen, 650 mg, Q6H PRN   Or  acetaminophen, 650 mg, Q6H PRN  oxyCODONE-acetaminophen, 1 tablet, BID PRN   And  oxyCODONE, 2.5 mg, BID PRN         Objective:    BP 98/62   Pulse 67   Temp 97.7 °F (36.5 °C) (Oral)   Resp 16   Ht 1.854 m (6' 1\")   Wt 84.3 kg (185 lb 13.6 oz)   SpO2 92%   BMI 24.52 kg/m²   Skin: warm and dry, no rash or erythema  Pulmonary/Chest: clear to auscultation bilaterally- no wheezes, rales or rhonchi, normal air movement, no respiratory distress  Cardiovascular: rhythm reg at rate of 68  Abdomen: soft, non-tender, non-distended, normal bowel sounds, no masses or organomegaly  Extremities: no cyanosis, no clubbing, and no edema      Recent Labs     01/14/24  1245 01/15/24  0723    138   K 4.1 3.9    104   CO2 28 23   BUN 9 10   CREATININE 0.9 0.6*   GLUCOSE 110* 98   CALCIUM 9.9 9.6       Recent Labs     01/14/24  1245 01/15/24  0781

## 2024-01-16 NOTE — PROGRESS NOTES
Pt states he has been having diarrhea this afternoon. States he has had 3 episodes. Given pt a hat and instructed next time he has diarrhea to let us know so it can be sent for c-diff testing.

## 2024-01-17 LAB
ALBUMIN SERPL-MCNC: 3.6 G/DL (ref 3.5–5.2)
ALP SERPL-CCNC: 65 U/L (ref 40–129)
ALT SERPL-CCNC: 8 U/L (ref 0–40)
ANION GAP SERPL CALCULATED.3IONS-SCNC: 9 MMOL/L (ref 7–16)
AST SERPL-CCNC: 12 U/L (ref 0–39)
BASOPHILS # BLD: 0 K/UL (ref 0–0.2)
BASOPHILS NFR BLD: 0 % (ref 0–2)
BILIRUB SERPL-MCNC: 0.3 MG/DL (ref 0–1.2)
BUN SERPL-MCNC: 9 MG/DL (ref 6–23)
CALCIUM SERPL-MCNC: 9.6 MG/DL (ref 8.6–10.2)
CHLORIDE SERPL-SCNC: 104 MMOL/L (ref 98–107)
CO2 SERPL-SCNC: 25 MMOL/L (ref 22–29)
CREAT SERPL-MCNC: 0.5 MG/DL (ref 0.7–1.2)
EOSINOPHIL # BLD: 0 K/UL (ref 0.05–0.5)
EOSINOPHILS RELATIVE PERCENT: 0 % (ref 0–6)
ERYTHROCYTE [DISTWIDTH] IN BLOOD BY AUTOMATED COUNT: 14.3 % (ref 11.5–15)
GFR SERPL CREATININE-BSD FRML MDRD: >60 ML/MIN/1.73M2
GLUCOSE SERPL-MCNC: 120 MG/DL (ref 74–99)
HCT VFR BLD AUTO: 48.6 % (ref 37–54)
HGB BLD-MCNC: 15.9 G/DL (ref 12.5–16.5)
LYMPHOCYTES NFR BLD: 0.19 K/UL (ref 1.5–4)
LYMPHOCYTES RELATIVE PERCENT: 3 % (ref 20–42)
MCH RBC QN AUTO: 30.5 PG (ref 26–35)
MCHC RBC AUTO-ENTMCNC: 32.7 G/DL (ref 32–34.5)
MCV RBC AUTO: 93.1 FL (ref 80–99.9)
METAMYELOCYTES ABSOLUTE COUNT: 0.06 K/UL (ref 0–0.12)
METAMYELOCYTES: 1 % (ref 0–1)
MONOCYTES NFR BLD: 0.38 K/UL (ref 0.1–0.95)
MONOCYTES NFR BLD: 5 % (ref 2–12)
NEUTROPHILS NFR BLD: 91 % (ref 43–80)
NEUTS SEG NFR BLD: 6.57 K/UL (ref 1.8–7.3)
PLATELET # BLD AUTO: 156 K/UL (ref 130–450)
PMV BLD AUTO: 10.9 FL (ref 7–12)
POTASSIUM SERPL-SCNC: 4.4 MMOL/L (ref 3.5–5)
PROT SERPL-MCNC: 6.1 G/DL (ref 6.4–8.3)
RBC # BLD AUTO: 5.22 M/UL (ref 3.8–5.8)
RBC # BLD: ABNORMAL 10*6/UL
SODIUM SERPL-SCNC: 138 MMOL/L (ref 132–146)
WBC OTHER # BLD: 7.2 K/UL (ref 4.5–11.5)

## 2024-01-17 PROCEDURE — 36415 COLL VENOUS BLD VENIPUNCTURE: CPT

## 2024-01-17 PROCEDURE — 87070 CULTURE OTHR SPECIMN AEROBIC: CPT

## 2024-01-17 PROCEDURE — 6370000000 HC RX 637 (ALT 250 FOR IP): Performed by: STUDENT IN AN ORGANIZED HEALTH CARE EDUCATION/TRAINING PROGRAM

## 2024-01-17 PROCEDURE — 2580000003 HC RX 258: Performed by: INTERNAL MEDICINE

## 2024-01-17 PROCEDURE — 85025 COMPLETE CBC W/AUTO DIFF WBC: CPT

## 2024-01-17 PROCEDURE — 87205 SMEAR GRAM STAIN: CPT

## 2024-01-17 PROCEDURE — 2060000000 HC ICU INTERMEDIATE R&B

## 2024-01-17 PROCEDURE — 2700000000 HC OXYGEN THERAPY PER DAY

## 2024-01-17 PROCEDURE — 94640 AIRWAY INHALATION TREATMENT: CPT

## 2024-01-17 PROCEDURE — 6360000002 HC RX W HCPCS

## 2024-01-17 PROCEDURE — 87077 CULTURE AEROBIC IDENTIFY: CPT

## 2024-01-17 PROCEDURE — 6360000002 HC RX W HCPCS: Performed by: STUDENT IN AN ORGANIZED HEALTH CARE EDUCATION/TRAINING PROGRAM

## 2024-01-17 PROCEDURE — 99232 SBSQ HOSP IP/OBS MODERATE 35: CPT | Performed by: INTERNAL MEDICINE

## 2024-01-17 PROCEDURE — 2580000003 HC RX 258: Performed by: STUDENT IN AN ORGANIZED HEALTH CARE EDUCATION/TRAINING PROGRAM

## 2024-01-17 PROCEDURE — 80053 COMPREHEN METABOLIC PANEL: CPT

## 2024-01-17 RX ADMIN — FAMOTIDINE 20 MG: 20 TABLET ORAL at 21:15

## 2024-01-17 RX ADMIN — METHYLPREDNISOLONE SODIUM SUCCINATE 40 MG: 40 INJECTION, POWDER, LYOPHILIZED, FOR SOLUTION INTRAMUSCULAR; INTRAVENOUS at 09:34

## 2024-01-17 RX ADMIN — AZITHROMYCIN 500 MG: 250 TABLET, FILM COATED ORAL at 10:05

## 2024-01-17 RX ADMIN — SODIUM CHLORIDE, PRESERVATIVE FREE 10 ML: 5 INJECTION INTRAVENOUS at 21:15

## 2024-01-17 RX ADMIN — ENOXAPARIN SODIUM 40 MG: 100 INJECTION SUBCUTANEOUS at 09:34

## 2024-01-17 RX ADMIN — IPRATROPIUM BROMIDE AND ALBUTEROL SULFATE 1 DOSE: .5; 2.5 SOLUTION RESPIRATORY (INHALATION) at 08:37

## 2024-01-17 RX ADMIN — BUDESONIDE 1000 MCG: 0.5 SUSPENSION RESPIRATORY (INHALATION) at 08:37

## 2024-01-17 RX ADMIN — BUMETANIDE 1 MG: 1 TABLET ORAL at 09:34

## 2024-01-17 RX ADMIN — SODIUM CHLORIDE: 9 INJECTION, SOLUTION INTRAVENOUS at 10:06

## 2024-01-17 RX ADMIN — IPRATROPIUM BROMIDE AND ALBUTEROL SULFATE 1 DOSE: .5; 2.5 SOLUTION RESPIRATORY (INHALATION) at 16:28

## 2024-01-17 RX ADMIN — ARFORMOTEROL TARTRATE 15 MCG: 15 SOLUTION RESPIRATORY (INHALATION) at 20:52

## 2024-01-17 RX ADMIN — METHYLPREDNISOLONE SODIUM SUCCINATE 40 MG: 40 INJECTION, POWDER, LYOPHILIZED, FOR SOLUTION INTRAMUSCULAR; INTRAVENOUS at 21:15

## 2024-01-17 RX ADMIN — BUDESONIDE 1000 MCG: 0.5 SUSPENSION RESPIRATORY (INHALATION) at 20:52

## 2024-01-17 RX ADMIN — ARFORMOTEROL TARTRATE 15 MCG: 15 SOLUTION RESPIRATORY (INHALATION) at 08:37

## 2024-01-17 RX ADMIN — IPRATROPIUM BROMIDE AND ALBUTEROL SULFATE 1 DOSE: .5; 2.5 SOLUTION RESPIRATORY (INHALATION) at 12:42

## 2024-01-17 RX ADMIN — ACETAMINOPHEN 650 MG: 325 TABLET ORAL at 18:30

## 2024-01-17 RX ADMIN — IPRATROPIUM BROMIDE AND ALBUTEROL SULFATE 1 DOSE: .5; 2.5 SOLUTION RESPIRATORY (INHALATION) at 20:52

## 2024-01-17 RX ADMIN — FAMOTIDINE 20 MG: 20 TABLET ORAL at 09:34

## 2024-01-17 ASSESSMENT — PAIN DESCRIPTION - DESCRIPTORS: DESCRIPTORS: ACHING;CRUSHING;GNAWING

## 2024-01-17 ASSESSMENT — PAIN SCALES - GENERAL
PAINLEVEL_OUTOF10: 8
PAINLEVEL_OUTOF10: 0

## 2024-01-17 ASSESSMENT — PAIN - FUNCTIONAL ASSESSMENT: PAIN_FUNCTIONAL_ASSESSMENT: ACTIVITIES ARE NOT PREVENTED

## 2024-01-17 ASSESSMENT — PAIN DESCRIPTION - LOCATION: LOCATION: HEAD

## 2024-01-17 NOTE — PROGRESS NOTES
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 He admits to brown sputum production. He feels minimal improvement and notes he is still dyspneic on exertion. Longer recovery with breathing after exertion.       Vitals:  VITALS:  /73   Pulse 83   Temp 97.8 °F (36.6 °C) (Oral)   Resp 16   Ht 1.854 m (6' 1\")   Wt 82 kg (180 lb 12.4 oz)   SpO2 91%   BMI 23.85 kg/m²     24HR INTAKE/OUTPUT:    No intake or output data in the 24 hours ending 24 1139    24HR PULSE OXIMETRY RANGE:    SpO2  Av.6 %  Min: 91 %  Max: 95 %    Medications:  IV:   sodium chloride      sodium chloride 75 mL/hr at 24 1006       Scheduled Meds:   budesonide  1,000 mcg Nebulization BID RT    arformoterol tartrate  15 mcg Nebulization BID RT    sodium chloride flush  5-40 mL IntraVENous 2 times per day    enoxaparin  40 mg SubCUTAneous Daily    famotidine  20 mg Oral BID    azithromycin  500 mg Oral Once per day on     bumetanide  1 mg Oral Once per day on     ipratropium 0.5 mg-albuterol 2.5 mg  1 Dose Inhalation Q4H WA RT    methylPREDNISolone  40 mg

## 2024-01-17 NOTE — PROGRESS NOTES
[facility]   [facility]    Admitting Date and Time: 1/14/2024 11:39 PM  Admit Dx: Acute hypoxic respiratory failure (HCC) [J96.01]  COPD exacerbation (HCC) [J44.1]    Subjective:    Patient was admitted with Acute hypoxic respiratory failure (HCC) [J96.01]  COPD exacerbation (HCC) [J44.1]. Patient denies fever, chills, cp, sob, n/v.     budesonide  1,000 mcg Nebulization BID RT    arformoterol tartrate  15 mcg Nebulization BID RT    sodium chloride flush  5-40 mL IntraVENous 2 times per day    enoxaparin  40 mg SubCUTAneous Daily    famotidine  20 mg Oral BID    azithromycin  500 mg Oral Once per day on Mon Wed Fri    bumetanide  1 mg Oral Once per day on Mon Wed Fri    ipratropium 0.5 mg-albuterol 2.5 mg  1 Dose Inhalation Q4H WA RT    methylPREDNISolone  40 mg IntraVENous Q12H     sodium chloride flush, 5-40 mL, PRN  sodium chloride, , PRN  potassium chloride, 40 mEq, PRN   Or  potassium alternative oral replacement, 40 mEq, PRN   Or  potassium chloride, 10 mEq, PRN  magnesium sulfate, 2,000 mg, PRN  ondansetron, 4 mg, Q8H PRN   Or  ondansetron, 4 mg, Q6H PRN  polyethylene glycol, 17 g, Daily PRN  acetaminophen, 650 mg, Q6H PRN   Or  acetaminophen, 650 mg, Q6H PRN  oxyCODONE-acetaminophen, 1 tablet, BID PRN   And  oxyCODONE, 2.5 mg, BID PRN         Objective:    /66   Pulse 86   Temp 98 °F (36.7 °C) (Oral)   Resp 16   Ht 1.854 m (6' 1\")   Wt 82 kg (180 lb 12.4 oz)   SpO2 91%   BMI 23.85 kg/m²   Skin: warm and dry, no rash or erythema  Pulmonary/Chest: clear to auscultation bilaterally- no wheezes, rales or rhonchi, normal air movement, no respiratory distress  Cardiovascular: rhythm reg at rate of 88  Abdomen: soft, non-tender, non-distended, normal bowel sounds, no masses or organomegaly  Extremities: no cyanosis, no clubbing, and no edema      Recent Labs     01/15/24  0723 01/17/24  0640    138   K 3.9 4.4    104   CO2 23 25   BUN 10 9   CREATININE 0.6*  failure with hypoxia([id number]%)POA wean o2 as able. Pulm following  Parainfluenza infection supportive care and tx underlying infection  Copd exac nebs and wean steroids as able  Dehydration monitor off iv fluids  Colonization of MAC continue suppressive azithro    Pt noted to have brown sputum. Pulm requesting sputum. Monitor pt closely.     Electronically signed by Mayur Ly, DO on 1/17/2024 at 5:23 PM

## 2024-01-18 PROCEDURE — 2580000003 HC RX 258: Performed by: STUDENT IN AN ORGANIZED HEALTH CARE EDUCATION/TRAINING PROGRAM

## 2024-01-18 PROCEDURE — 6370000000 HC RX 637 (ALT 250 FOR IP): Performed by: STUDENT IN AN ORGANIZED HEALTH CARE EDUCATION/TRAINING PROGRAM

## 2024-01-18 PROCEDURE — 2060000000 HC ICU INTERMEDIATE R&B

## 2024-01-18 PROCEDURE — 6360000002 HC RX W HCPCS

## 2024-01-18 PROCEDURE — 94640 AIRWAY INHALATION TREATMENT: CPT

## 2024-01-18 PROCEDURE — 99232 SBSQ HOSP IP/OBS MODERATE 35: CPT | Performed by: STUDENT IN AN ORGANIZED HEALTH CARE EDUCATION/TRAINING PROGRAM

## 2024-01-18 PROCEDURE — 2700000000 HC OXYGEN THERAPY PER DAY

## 2024-01-18 PROCEDURE — 6360000002 HC RX W HCPCS: Performed by: STUDENT IN AN ORGANIZED HEALTH CARE EDUCATION/TRAINING PROGRAM

## 2024-01-18 RX ORDER — PREDNISONE 20 MG/1
40 TABLET ORAL DAILY
Status: DISCONTINUED | OUTPATIENT
Start: 2024-01-19 | End: 2024-01-23

## 2024-01-18 RX ADMIN — FAMOTIDINE 20 MG: 20 TABLET ORAL at 20:21

## 2024-01-18 RX ADMIN — IPRATROPIUM BROMIDE AND ALBUTEROL SULFATE 1 DOSE: .5; 2.5 SOLUTION RESPIRATORY (INHALATION) at 17:24

## 2024-01-18 RX ADMIN — FAMOTIDINE 20 MG: 20 TABLET ORAL at 08:01

## 2024-01-18 RX ADMIN — IPRATROPIUM BROMIDE AND ALBUTEROL SULFATE 1 DOSE: .5; 2.5 SOLUTION RESPIRATORY (INHALATION) at 20:39

## 2024-01-18 RX ADMIN — ENOXAPARIN SODIUM 40 MG: 100 INJECTION SUBCUTANEOUS at 08:01

## 2024-01-18 RX ADMIN — SODIUM CHLORIDE, PRESERVATIVE FREE 10 ML: 5 INJECTION INTRAVENOUS at 20:22

## 2024-01-18 RX ADMIN — ARFORMOTEROL TARTRATE 15 MCG: 15 SOLUTION RESPIRATORY (INHALATION) at 10:04

## 2024-01-18 RX ADMIN — BUDESONIDE 1000 MCG: 0.5 SUSPENSION RESPIRATORY (INHALATION) at 20:39

## 2024-01-18 RX ADMIN — METHYLPREDNISOLONE SODIUM SUCCINATE 40 MG: 40 INJECTION, POWDER, LYOPHILIZED, FOR SOLUTION INTRAMUSCULAR; INTRAVENOUS at 08:02

## 2024-01-18 RX ADMIN — BUDESONIDE 1000 MCG: 0.5 SUSPENSION RESPIRATORY (INHALATION) at 10:04

## 2024-01-18 RX ADMIN — SODIUM CHLORIDE, PRESERVATIVE FREE 10 ML: 5 INJECTION INTRAVENOUS at 07:54

## 2024-01-18 RX ADMIN — IPRATROPIUM BROMIDE AND ALBUTEROL SULFATE 1 DOSE: .5; 2.5 SOLUTION RESPIRATORY (INHALATION) at 13:07

## 2024-01-18 RX ADMIN — ACETAMINOPHEN 650 MG: 325 TABLET ORAL at 20:21

## 2024-01-18 RX ADMIN — ARFORMOTEROL TARTRATE 15 MCG: 15 SOLUTION RESPIRATORY (INHALATION) at 20:39

## 2024-01-18 RX ADMIN — IPRATROPIUM BROMIDE AND ALBUTEROL SULFATE 1 DOSE: .5; 2.5 SOLUTION RESPIRATORY (INHALATION) at 10:04

## 2024-01-18 ASSESSMENT — PAIN DESCRIPTION - PAIN TYPE: TYPE: ACUTE PAIN

## 2024-01-18 ASSESSMENT — PAIN SCALES - GENERAL
PAINLEVEL_OUTOF10: 0
PAINLEVEL_OUTOF10: 4
PAINLEVEL_OUTOF10: 0
PAINLEVEL_OUTOF10: 0

## 2024-01-18 ASSESSMENT — PAIN - FUNCTIONAL ASSESSMENT: PAIN_FUNCTIONAL_ASSESSMENT: ACTIVITIES ARE NOT PREVENTED

## 2024-01-18 ASSESSMENT — PAIN DESCRIPTION - LOCATION: LOCATION: HEAD

## 2024-01-18 NOTE — PROGRESS NOTES
[facility] Hospitalist Progress Note    Admitting Date and Time: 1/14/2024 11:39 PM  Admit Dx: Acute hypoxic respiratory failure (HCC) [J96.01]  COPD exacerbation (HCC) [J44.1]    Subjective:  Patient is being followed for Acute hypoxic respiratory failure (HCC) [J96.01]  COPD exacerbation (HCC) [J44.1]   Pt feels well today  Per RN: no additional concerns    ROS: denies fever, chills, cp, sob, n/v, HA unless stated above.      [START ON 1/19/2024] predniSONE  40 mg Oral Daily    budesonide  1,000 mcg Nebulization BID RT    arformoterol tartrate  15 mcg Nebulization BID RT    sodium chloride flush  5-40 mL IntraVENous 2 times per day    enoxaparin  40 mg SubCUTAneous Daily    famotidine  20 mg Oral BID    azithromycin  500 mg Oral Once per day on Mon Wed Fri    bumetanide  1 mg Oral Once per day on Mon Wed Fri    ipratropium 0.5 mg-albuterol 2.5 mg  1 Dose Inhalation Q4H WA RT     sodium chloride flush, 5-40 mL, PRN  sodium chloride, , PRN  potassium chloride, 40 mEq, PRN   Or  potassium alternative oral replacement, 40 mEq, PRN   Or  potassium chloride, 10 mEq, PRN  magnesium sulfate, 2,000 mg, PRN  ondansetron, 4 mg, Q8H PRN   Or  ondansetron, 4 mg, Q6H PRN  polyethylene glycol, 17 g, Daily PRN  acetaminophen, 650 mg, Q6H PRN   Or  acetaminophen, 650 mg, Q6H PRN  oxyCODONE-acetaminophen, 1 tablet, BID PRN   And  oxyCODONE, 2.5 mg, BID PRN         Objective:  BP (!) 140/87   Pulse 70   Temp 97.7 °F (36.5 °C) (Oral)   Resp 18   Ht 1.854 m (6' 1\")   Wt 85.9 kg (189 lb 6.4 oz)   SpO2 91%   BMI 24.99 kg/m²     General Appearance: alert and oriented to person, place and time and in NAD, sitting in bed eating lunch  Skin: warm and dry  Head: normocephalic and atraumatic  Eyes: PERRL, EOMI, conjunctivae normal  Neck: neck supple, trachea midline   Pulmonary/Chest: CTAB, no w/r/r, normal air movement, no respiratory distress, 4L NC  Cardiovascular: RRR, no murmurs  Abdomen: soft, non-tender, non-distended,

## 2024-01-18 NOTE — PROGRESS NOTES
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 He states his sputum has turned to a clear color now. He feels his dyspnea has improved especially on exertion.       Vitals:  VITALS:  BP (!) 140/87   Pulse 70   Temp 97.7 °F (36.5 °C) (Oral)   Resp 18   Ht 1.854 m (6' 1\")   Wt 85.9 kg (189 lb 6.4 oz)   SpO2 91%   BMI 24.99 kg/m²     24HR INTAKE/OUTPUT:      Intake/Output Summary (Last 24 hours) at 2024 1058  Last data filed at 2024 1850  Gross per 24 hour   Intake --   Output 400 ml   Net -400 ml       24HR PULSE OXIMETRY RANGE:    SpO2  Av.5 %  Min: 91 %  Max: 92 %    Medications:  IV:   sodium chloride         Scheduled Meds:   budesonide  1,000 mcg Nebulization BID RT    arformoterol tartrate  15 mcg Nebulization BID RT    sodium chloride flush  5-40 mL IntraVENous 2 times per day    enoxaparin  40 mg SubCUTAneous Daily    famotidine  20 mg Oral BID    azithromycin  500 mg Oral Once per day on     bumetanide  1 mg Oral Once per day on     ipratropium 0.5 mg-albuterol 2.5 mg  1 Dose Inhalation Q4H WA RT    methylPREDNISolone  40 mg  and consultations, discussing with staff and family was more than 35 minutes.    Please note that voice recognition technology was used in the preparation of this note and make therefore it may contain inadvertent transcription errors.  If the patient is a COVID 19 isolation patient, the above physical exam reflects that of the examining physician for the day.        Dea Cason APRN - NP    Attestation    An additional 15+ minutes was spent at the bedside, discussion with physician extender and chart/lab review.  Awake and alert this p.m. almost back to baseline.  Still some bronchospasm.  Sputum has plenty of PMNs and what appears to be some mixed tonja.  Hopefully discharge tomorrow morning.    I have discussed the case, including pertinent history and exam findings with the nurse practitioner.  I have reviewed meds and pertinent labs and the key elements of the encounter have been performed by me.  I agree with the assessment, plan and orders as documented by the nurse practitioner.  Additions and corrections are reflected in the signed note.    Rolf Little MD,  M.D., F.C.C.P.  [facility]    [facility], [address] [place] [address], [address], [place] [address]  Office visits:  [address] [place] [address]

## 2024-01-18 NOTE — PLAN OF CARE
Problem: Pain  Goal: Verbalizes/displays adequate comfort level or baseline comfort level  Outcome: Progressing     Problem: Safety - Adult  Goal: Free from fall injury  Outcome: Progressing     Problem: Chronic Conditions and Co-morbidities  Goal: Patient's chronic conditions and co-morbidity symptoms are monitored and maintained or improved  Outcome: Progressing  Flowsheets (Taken 1/17/2024 2000 by Zain Nolasco RN)  Care Plan - Patient's Chronic Conditions and Co-Morbidity Symptoms are Monitored and Maintained or Improved: Monitor and assess patient's chronic conditions and comorbid symptoms for stability, deterioration, or improvement

## 2024-01-18 NOTE — PROGRESS NOTES
[facility] Quality Flow/Interdisciplinary Rounds Progress Note        Quality Flow Rounds held on January 18, 2024    Disciplines Attending:  Bedside Nurse, , , and Nursing Unit Leadership    Jason Cox was admitted on 1/14/2024 11:39 PM    Anticipated Discharge Date:  Expected Discharge Date: 01/17/24    Disposition:    Sandro Score:  Sandro Scale Score: 21    Readmission Risk              Risk of Unplanned Readmission:  19           Discussed patient goal for the day, patient clinical progression, and barriers to discharge.  The following Goal(s) of the Day/Commitment(s) have been identified:   iv steroids, iv fluids,       Juliana Cano RN  January 18, 2024

## 2024-01-19 LAB
ANION GAP SERPL CALCULATED.3IONS-SCNC: 12 MMOL/L (ref 7–16)
BASOPHILS # BLD: 0.02 K/UL (ref 0–0.2)
BASOPHILS NFR BLD: 0 % (ref 0–2)
BUN SERPL-MCNC: 15 MG/DL (ref 6–23)
CALCIUM SERPL-MCNC: 10.2 MG/DL (ref 8.6–10.2)
CHLORIDE SERPL-SCNC: 97 MMOL/L (ref 98–107)
CO2 SERPL-SCNC: 29 MMOL/L (ref 22–29)
CREAT SERPL-MCNC: 0.7 MG/DL (ref 0.7–1.2)
EOSINOPHIL # BLD: 0.01 K/UL (ref 0.05–0.5)
EOSINOPHILS RELATIVE PERCENT: 0 % (ref 0–6)
ERYTHROCYTE [DISTWIDTH] IN BLOOD BY AUTOMATED COUNT: 14.3 % (ref 11.5–15)
GFR SERPL CREATININE-BSD FRML MDRD: >60 ML/MIN/1.73M2
GLUCOSE SERPL-MCNC: 150 MG/DL (ref 74–99)
HCT VFR BLD AUTO: 57.4 % (ref 37–54)
HGB BLD-MCNC: 19.4 G/DL (ref 12.5–16.5)
IMM GRANULOCYTES # BLD AUTO: 0.07 K/UL (ref 0–0.58)
IMM GRANULOCYTES NFR BLD: 1 % (ref 0–5)
LYMPHOCYTES NFR BLD: 0.46 K/UL (ref 1.5–4)
LYMPHOCYTES RELATIVE PERCENT: 4 % (ref 20–42)
MCH RBC QN AUTO: 29.8 PG (ref 26–35)
MCHC RBC AUTO-ENTMCNC: 33.8 G/DL (ref 32–34.5)
MCV RBC AUTO: 88.2 FL (ref 80–99.9)
MICROORGANISM SPEC CULT: NORMAL
MICROORGANISM SPEC CULT: NORMAL
MONOCYTES NFR BLD: 0.45 K/UL (ref 0.1–0.95)
MONOCYTES NFR BLD: 4 % (ref 2–12)
NEUTROPHILS NFR BLD: 91 % (ref 43–80)
NEUTS SEG NFR BLD: 10.79 K/UL (ref 1.8–7.3)
PLATELET # BLD AUTO: 250 K/UL (ref 130–450)
PMV BLD AUTO: 10.1 FL (ref 7–12)
POTASSIUM SERPL-SCNC: 4 MMOL/L (ref 3.5–5)
RBC # BLD AUTO: 6.51 M/UL (ref 3.8–5.8)
RBC # BLD: NORMAL 10*6/UL
SERVICE CMNT-IMP: NORMAL
SERVICE CMNT-IMP: NORMAL
SODIUM SERPL-SCNC: 138 MMOL/L (ref 132–146)
SPECIMEN DESCRIPTION: NORMAL
SPECIMEN DESCRIPTION: NORMAL
WBC OTHER # BLD: 11.8 K/UL (ref 4.5–11.5)

## 2024-01-19 PROCEDURE — 2060000000 HC ICU INTERMEDIATE R&B

## 2024-01-19 PROCEDURE — 94640 AIRWAY INHALATION TREATMENT: CPT

## 2024-01-19 PROCEDURE — 2580000003 HC RX 258: Performed by: STUDENT IN AN ORGANIZED HEALTH CARE EDUCATION/TRAINING PROGRAM

## 2024-01-19 PROCEDURE — 6360000002 HC RX W HCPCS: Performed by: SPECIALIST

## 2024-01-19 PROCEDURE — 80048 BASIC METABOLIC PNL TOTAL CA: CPT

## 2024-01-19 PROCEDURE — 2700000000 HC OXYGEN THERAPY PER DAY

## 2024-01-19 PROCEDURE — 6370000000 HC RX 637 (ALT 250 FOR IP): Performed by: STUDENT IN AN ORGANIZED HEALTH CARE EDUCATION/TRAINING PROGRAM

## 2024-01-19 PROCEDURE — 6370000000 HC RX 637 (ALT 250 FOR IP)

## 2024-01-19 PROCEDURE — 94669 MECHANICAL CHEST WALL OSCILL: CPT

## 2024-01-19 PROCEDURE — 85025 COMPLETE CBC W/AUTO DIFF WBC: CPT

## 2024-01-19 PROCEDURE — 6360000002 HC RX W HCPCS

## 2024-01-19 PROCEDURE — 6370000000 HC RX 637 (ALT 250 FOR IP): Performed by: SPECIALIST

## 2024-01-19 PROCEDURE — 99232 SBSQ HOSP IP/OBS MODERATE 35: CPT | Performed by: STUDENT IN AN ORGANIZED HEALTH CARE EDUCATION/TRAINING PROGRAM

## 2024-01-19 PROCEDURE — 6360000002 HC RX W HCPCS: Performed by: STUDENT IN AN ORGANIZED HEALTH CARE EDUCATION/TRAINING PROGRAM

## 2024-01-19 RX ORDER — TOBRAMYCIN INHALATION SOLUTION 300 MG/5ML
300 INHALANT RESPIRATORY (INHALATION)
Status: COMPLETED | OUTPATIENT
Start: 2024-01-19 | End: 2024-01-24

## 2024-01-19 RX ORDER — IPRATROPIUM BROMIDE AND ALBUTEROL SULFATE 2.5; .5 MG/3ML; MG/3ML
3 SOLUTION RESPIRATORY (INHALATION) EVERY 6 HOURS PRN
Qty: 90 EACH | Refills: 1 | Status: SHIPPED | OUTPATIENT
Start: 2024-01-19

## 2024-01-19 RX ORDER — PREDNISONE 10 MG/1
TABLET ORAL
Qty: 30 TABLET | Refills: 0 | Status: SHIPPED | OUTPATIENT
Start: 2024-01-20

## 2024-01-19 RX ADMIN — FAMOTIDINE 20 MG: 20 TABLET ORAL at 19:32

## 2024-01-19 RX ADMIN — TOBRAMYCIN 300 MG: 300 SOLUTION RESPIRATORY (INHALATION) at 18:22

## 2024-01-19 RX ADMIN — IPRATROPIUM BROMIDE AND ALBUTEROL SULFATE 1 DOSE: .5; 2.5 SOLUTION RESPIRATORY (INHALATION) at 18:22

## 2024-01-19 RX ADMIN — IPRATROPIUM BROMIDE AND ALBUTEROL SULFATE 1 DOSE: .5; 2.5 SOLUTION RESPIRATORY (INHALATION) at 08:53

## 2024-01-19 RX ADMIN — ENOXAPARIN SODIUM 40 MG: 100 INJECTION SUBCUTANEOUS at 07:56

## 2024-01-19 RX ADMIN — IPRATROPIUM BROMIDE AND ALBUTEROL SULFATE 1 DOSE: .5; 2.5 SOLUTION RESPIRATORY (INHALATION) at 16:18

## 2024-01-19 RX ADMIN — FAMOTIDINE 20 MG: 20 TABLET ORAL at 07:56

## 2024-01-19 RX ADMIN — BUDESONIDE 1000 MCG: 0.5 SUSPENSION RESPIRATORY (INHALATION) at 18:22

## 2024-01-19 RX ADMIN — IPRATROPIUM BROMIDE AND ALBUTEROL SULFATE 1 DOSE: .5; 2.5 SOLUTION RESPIRATORY (INHALATION) at 11:55

## 2024-01-19 RX ADMIN — AZITHROMYCIN 500 MG: 250 TABLET, FILM COATED ORAL at 07:56

## 2024-01-19 RX ADMIN — LEVOFLOXACIN 750 MG: 500 TABLET, FILM COATED ORAL at 14:37

## 2024-01-19 RX ADMIN — BUMETANIDE 1 MG: 1 TABLET ORAL at 07:56

## 2024-01-19 RX ADMIN — SODIUM CHLORIDE, PRESERVATIVE FREE 10 ML: 5 INJECTION INTRAVENOUS at 07:46

## 2024-01-19 RX ADMIN — ARFORMOTEROL TARTRATE 15 MCG: 15 SOLUTION RESPIRATORY (INHALATION) at 18:22

## 2024-01-19 RX ADMIN — PREDNISONE 40 MG: 20 TABLET ORAL at 07:56

## 2024-01-19 RX ADMIN — ARFORMOTEROL TARTRATE 15 MCG: 15 SOLUTION RESPIRATORY (INHALATION) at 08:53

## 2024-01-19 RX ADMIN — SODIUM CHLORIDE, PRESERVATIVE FREE 10 ML: 5 INJECTION INTRAVENOUS at 19:33

## 2024-01-19 RX ADMIN — BUDESONIDE 1000 MCG: 0.5 SUSPENSION RESPIRATORY (INHALATION) at 08:53

## 2024-01-19 ASSESSMENT — PAIN SCALES - GENERAL
PAINLEVEL_OUTOF10: 0
PAINLEVEL_OUTOF10: 0

## 2024-01-19 NOTE — PROGRESS NOTES
[facility] Quality Flow/Interdisciplinary Rounds Progress Note        Quality Flow Rounds held on January 19, 2024    Disciplines Attending:  Bedside Nurse, , , and Nursing Unit Leadership    Jason Cox was admitted on 1/14/2024 11:39 PM    Anticipated Discharge Date:  Expected Discharge Date: 01/19/24    Disposition:    Sandro Score:  Sandro Scale Score: 21    Readmission Risk              Risk of Unplanned Readmission:  19           Discussed patient goal for the day, patient clinical progression, and barriers to discharge.  The following Goal(s) of the Day/Commitment(s) have been identified:   await resp culture, possible d/c if results, iv fluids, po abx.       Juliana Cano RN  January 19, 2024

## 2024-01-19 NOTE — CARE COORDINATION
Awaiting respiratory culture. Copd exac w/parainfluenza, steroids and azithromax po. Pt will return home upon discharge where he is I, has both a cane and walker if needed. Pt has home O2 4 - 4.5l as needed during the day and continuously HS via [employer]. He has an inogen and a concentrator at home that goes up to 10l and has a nebulizer. Pt states he will only use the inogen and not use a portable tank if greater than 5l l is needed. Will follow.  Lorraine Hernandez, ERICKN, RN  [facility] Case Management  [phone]

## 2024-01-19 NOTE — PLAN OF CARE
Problem: Pain  Goal: Verbalizes/displays adequate comfort level or baseline comfort level  Outcome: Progressing     Problem: Safety - Adult  Goal: Free from fall injury  Outcome: Progressing     Problem: Chronic Conditions and Co-morbidities  Goal: Patient's chronic conditions and co-morbidity symptoms are monitored and maintained or improved  Outcome: Progressing

## 2024-01-19 NOTE — PROGRESS NOTES
[facility] Hospitalist Progress Note    Admitting Date and Time: 1/14/2024 11:39 PM  Admit Dx: Acute hypoxic respiratory failure (HCC) [J96.01]  COPD exacerbation (HCC) [J44.1]    Subjective:  Patient is being followed for Acute hypoxic respiratory failure (HCC) [J96.01]  COPD exacerbation (HCC) [J44.1]   Pt feels well today  Per RN: no additional concerns    ROS: denies fever, chills, cp, sob, n/v, HA unless stated above.      predniSONE  40 mg Oral Daily    budesonide  1,000 mcg Nebulization BID RT    arformoterol tartrate  15 mcg Nebulization BID RT    sodium chloride flush  5-40 mL IntraVENous 2 times per day    enoxaparin  40 mg SubCUTAneous Daily    famotidine  20 mg Oral BID    azithromycin  500 mg Oral Once per day on Mon Wed Fri    bumetanide  1 mg Oral Once per day on Mon Wed Fri    ipratropium 0.5 mg-albuterol 2.5 mg  1 Dose Inhalation Q4H WA RT     sodium chloride flush, 5-40 mL, PRN  sodium chloride, , PRN  potassium chloride, 40 mEq, PRN   Or  potassium alternative oral replacement, 40 mEq, PRN   Or  potassium chloride, 10 mEq, PRN  magnesium sulfate, 2,000 mg, PRN  ondansetron, 4 mg, Q8H PRN   Or  ondansetron, 4 mg, Q6H PRN  polyethylene glycol, 17 g, Daily PRN  acetaminophen, 650 mg, Q6H PRN   Or  acetaminophen, 650 mg, Q6H PRN  oxyCODONE-acetaminophen, 1 tablet, BID PRN   And  oxyCODONE, 2.5 mg, BID PRN         Objective:  /79   Pulse 95   Temp 97.9 °F (36.6 °C) (Oral)   Resp 19   Ht 1.854 m (6' 1\")   Wt 84.5 kg (186 lb 4.6 oz)   SpO2 90%   BMI 24.58 kg/m²     General Appearance: alert and oriented to person, place and time and in NAD, sitting in bed eating lunch  Skin: warm and dry  Head: normocephalic and atraumatic  Eyes: PERRL, EOMI, conjunctivae normal  Neck: neck supple, trachea midline   Pulmonary/Chest: CTAB, no w/r/r, normal air movement, no respiratory distress, 4L NC  Cardiovascular: RRR, no murmurs  Abdomen: soft, non-tender, non-distended, normal bowel sounds, no

## 2024-01-19 NOTE — PROGRESS NOTES
Nutrition Assessment     Type and Reason for Visit: Initial, RD Nutrition Re-Screen/LOS    Nutrition Recommendations/Plan:   Continue current diet & monitor  Will add ONS     Nutrition Assessment:  Pt w/ acute COPD exacerbation 2/2 parainfluenza 4 infection. Hx HF. Will add ONS to optimize nutrient intake & monitor.    Nutrition Related Findings:   A&O, -1.2L, no edema, abd soft/WDL   Wound Type: None    Current Nutrition Therapies:    ADULT DIET; Regular    Anthropometric Measures:  Height: 185.4 cm (6' 1\")  Current Body Wt: 85.9 kg (189 lb 6.4 oz) (1/18)   BMI: 25    Nutrition Diagnosis:   No nutrition diagnosis at this time     Nutrition Interventions:   Food and/or Nutrient Delivery: Continue Current Diet, Start Oral Nutrition Supplement  Nutrition Education/Counseling: No recommendation at this time  Coordination of Nutrition Care: Continue to monitor while inpatient       Goals:     Goals: PO intake 75% or greater, by next RD assessment       Nutrition Monitoring and Evaluation:      Food/Nutrient Intake Outcomes: Food and Nutrient Intake, Supplement Intake  Physical Signs/Symptoms Outcomes: Biochemical Data, GI Status, Fluid Status or Edema, Nutrition Focused Physical Findings, Skin, Weight    Discharge Planning:    Too soon to determine     Miranda D'Amico, RD, LD  Contact: [phone]

## 2024-01-19 NOTE — PROGRESS NOTES
[facility]  [address], [address]  [place] [address]      Pulmonary Progress Note      SUBJECTIVE:  claims feeling some better with breathing, (?) looser cough with minimal sputum production which is slightly thick, on 5 li NC saturating about 89%, sitting up in bed.    OBJECTIVE    Medications    Continuous Infusions:   sodium chloride         Scheduled Meds:   predniSONE  40 mg Oral Daily    budesonide  1,000 mcg Nebulization BID RT    arformoterol tartrate  15 mcg Nebulization BID RT    sodium chloride flush  5-40 mL IntraVENous 2 times per day    enoxaparin  40 mg SubCUTAneous Daily    famotidine  20 mg Oral BID    azithromycin  500 mg Oral Once per day on     bumetanide  1 mg Oral Once per day on     ipratropium 0.5 mg-albuterol 2.5 mg  1 Dose Inhalation Q4H WA RT       PRN Meds:sodium chloride flush, sodium chloride, potassium chloride **OR** potassium alternative oral replacement **OR** potassium chloride, magnesium sulfate, ondansetron **OR** ondansetron, polyethylene glycol, acetaminophen **OR** acetaminophen, oxyCODONE-acetaminophen **AND** oxyCODONE    Physical    VITALS:  BP (!) 143/87   Pulse 78   Temp 97.7 °F (36.5 °C) (Oral)   Resp 20   Ht 1.854 m (6' 1\")   Wt 84.5 kg (186 lb 4.6 oz)   SpO2 90%   BMI 24.58 kg/m²     24HR INTAKE/OUTPUT:      Intake/Output Summary (Last 24 hours) at 2024 0916  Last data filed at 2024 0913  Gross per 24 hour   Intake --   Output 600 ml   Net -600 ml       24HR PULSE OXIMETRY RANGE:    SpO2  Av.4 %  Min: 74 %  Max: 98 %    General appearance: alert, appears stated age, and cooperative  Lungs: rhonchi bilaterally with cough  Heart: regular rate and rhythm, S1, S2 normal, no murmur, click, rub or gallop  Abdomen: soft, non-tender; bowel sounds normal; no masses,  no organomegaly  Extremities: extremities normal, atraumatic, no cyanosis or edema  Neurologic:

## 2024-01-19 NOTE — CONSULTS
[facility]  [facility]  Consultation Note     Admit Date: 1/14/2024 11:39 PM    Reason for Consult:   PKTY    Attending Physician:  Glenys Christine MD    HISTORY OF PRESENT ILLNESS:             The history is obtained from extensive review of available past medical records. The patient is a 73 y.o. male who is previously known to the ID service.    The patient has a history of COPD.  He is currently being followed by me as an outpatient.  He was treated for a Mycobacterium AVM complex infection with Ethambutol, Rifampin, Clarithromycin and IV Amikacin.  He is currently on Azithromycin suppressive treatment 3 times a week.    The patient was admitted to [facility] on 1/16/2024 with an exacerbation COPD.  He was, however, having a productive dark and thick sputum.  Cultures are growing Pseudomonas aeruginosa.  ID was asked to see him in consultation.    Past Medical History:        Diagnosis Date    Acute and chronic respiratory failure with hypoxia ([facility]) 10/12/2017    chronic oxygen use    Acute exacerbation of chronic obstructive pulmonary disease (COPD) ([facility]) 8/7/2023    Acute heart failure with preserved ejection fraction ([facility]) 04/08/2021    follows with PCP    Acute respiratory disease due to severe acute respiratory syndrome coronavirus 2 (SARS-CoV-2) 01/01/2021    Acute respiratory failure with hypoxia (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) 08/15/2019    First seen by ID; treatment started 9/4/2019    Emphysema lung ([facility])     Glucose intolerance 06/10/2019    Hilar adenopathy 06/10/2019    Hypophosphatemia 06/10/2019    Immunocompromised ([facility])    Physical Activity: Inactive (2023)    Exercise Vital Sign     Days of Exercise per Week: 0 days     Minutes of Exercise per Session: 0 min   Housing Stability: Low Risk  (2024)    Housing Stability Vital Sign     Unable to Pay for Housing in the Last Year: No     Number of Places Lived in the Last Year: 1     Unstable Housing in the Last Year: No      Pets: 2 dogs  Travel: None  Patient retired as a     Family History:       Problem Relation Age of Onset    Other Mother          age 86    Other Father          age 85   . Otherwise non-pertinent to the chief complaint.    REVIEW OF SYSTEMS:    Constitutional: Positive for fevers, chills, diaphoresis  Neurologic: Negative   Psychiatric: Negative  Rheumatologic: Negative   Endocrine: Negative  Hematologic: Negative  Immunologic: Negative  ENT: Negative  Respiratory: As in the HPI  Cardiovascular: Negative  GI: Negative  : Negative  Musculoskeletal: Negative  Skin: No rashes.     PHYSICAL EXAM:    Vitals:   /79   Pulse 95   Temp 97.9 °F (36.6 °C) (Oral)   Resp 19   Ht 1.854 m (6' 1\")   Wt 84.5 kg (186 lb 4.6 oz)   SpO2 90%   BMI 24.58 kg/m²   Constitutional: The patient is awake, alert, and oriented.  He is sitting up in bed.  He is in no distress.  O2 by nasal cannula.  Skin: Warm and dry. No rashes were noted.   HEENT: Eyes show round, and reactive pupils. No jaundice. Moist mucous membranes, no ulcerations, no thrush.   Neck: Supple to movements. No lymphadenopathy.   Chest: No use of accessory muscles to breathe. Symmetrical expansion. Auscultation reveals scattered rhonchi.  Cardiovascular: S1 and S2 are rhythmic and regular. No murmurs appreciated.   Abdomen: Positive bowel sounds to auscultation. Benign to palpation. No masses felt. No hepatosplenomegaly.  Extremities: No clubbing, no cyanosis, no edema.  Lines: Peripheral.    Lab Results   Component Value Date    CRP 4.0 08/10/2023    CRP 8.0 (H) 2023    CRP 14.0

## 2024-01-20 LAB
ANION GAP SERPL CALCULATED.3IONS-SCNC: 11 MMOL/L (ref 7–16)
BASOPHILS # BLD: 0.02 K/UL (ref 0–0.2)
BASOPHILS NFR BLD: 0 % (ref 0–2)
BUN SERPL-MCNC: 19 MG/DL (ref 6–23)
CALCIUM SERPL-MCNC: 9.6 MG/DL (ref 8.6–10.2)
CHLORIDE SERPL-SCNC: 100 MMOL/L (ref 98–107)
CO2 SERPL-SCNC: 29 MMOL/L (ref 22–29)
CREAT SERPL-MCNC: 0.7 MG/DL (ref 0.7–1.2)
CRP SERPL HS-MCNC: 31 MG/L (ref 0–5)
EOSINOPHIL # BLD: 0.02 K/UL (ref 0.05–0.5)
EOSINOPHILS RELATIVE PERCENT: 0 % (ref 0–6)
ERYTHROCYTE [DISTWIDTH] IN BLOOD BY AUTOMATED COUNT: 14 % (ref 11.5–15)
ERYTHROCYTE [SEDIMENTATION RATE] IN BLOOD BY WESTERGREN METHOD: 1 MM/HR (ref 0–15)
GFR SERPL CREATININE-BSD FRML MDRD: >60 ML/MIN/1.73M2
GLUCOSE SERPL-MCNC: 80 MG/DL (ref 74–99)
HCT VFR BLD AUTO: 53.6 % (ref 37–54)
HGB BLD-MCNC: 18.1 G/DL (ref 12.5–16.5)
IMM GRANULOCYTES # BLD AUTO: 0.07 K/UL (ref 0–0.58)
IMM GRANULOCYTES NFR BLD: 1 % (ref 0–5)
LYMPHOCYTES NFR BLD: 1.45 K/UL (ref 1.5–4)
LYMPHOCYTES RELATIVE PERCENT: 14 % (ref 20–42)
MCH RBC QN AUTO: 29.9 PG (ref 26–35)
MCHC RBC AUTO-ENTMCNC: 33.8 G/DL (ref 32–34.5)
MCV RBC AUTO: 88.6 FL (ref 80–99.9)
MICROORGANISM SPEC CULT: ABNORMAL
MICROORGANISM SPEC CULT: ABNORMAL
MICROORGANISM/AGENT SPEC: ABNORMAL
MONOCYTES NFR BLD: 0.8 K/UL (ref 0.1–0.95)
MONOCYTES NFR BLD: 8 % (ref 2–12)
NEUTROPHILS NFR BLD: 77 % (ref 43–80)
NEUTS SEG NFR BLD: 7.73 K/UL (ref 1.8–7.3)
PLATELET # BLD AUTO: 185 K/UL (ref 130–450)
PMV BLD AUTO: 10.6 FL (ref 7–12)
POTASSIUM SERPL-SCNC: 3.4 MMOL/L (ref 3.5–5)
RBC # BLD AUTO: 6.05 M/UL (ref 3.8–5.8)
SODIUM SERPL-SCNC: 140 MMOL/L (ref 132–146)
SPECIMEN DESCRIPTION: ABNORMAL
WBC OTHER # BLD: 10.1 K/UL (ref 4.5–11.5)

## 2024-01-20 PROCEDURE — 94640 AIRWAY INHALATION TREATMENT: CPT

## 2024-01-20 PROCEDURE — 36415 COLL VENOUS BLD VENIPUNCTURE: CPT

## 2024-01-20 PROCEDURE — 86140 C-REACTIVE PROTEIN: CPT

## 2024-01-20 PROCEDURE — 80048 BASIC METABOLIC PNL TOTAL CA: CPT

## 2024-01-20 PROCEDURE — 85652 RBC SED RATE AUTOMATED: CPT

## 2024-01-20 PROCEDURE — 6370000000 HC RX 637 (ALT 250 FOR IP)

## 2024-01-20 PROCEDURE — 94669 MECHANICAL CHEST WALL OSCILL: CPT

## 2024-01-20 PROCEDURE — 6360000002 HC RX W HCPCS

## 2024-01-20 PROCEDURE — 6360000002 HC RX W HCPCS: Performed by: STUDENT IN AN ORGANIZED HEALTH CARE EDUCATION/TRAINING PROGRAM

## 2024-01-20 PROCEDURE — 6370000000 HC RX 637 (ALT 250 FOR IP): Performed by: STUDENT IN AN ORGANIZED HEALTH CARE EDUCATION/TRAINING PROGRAM

## 2024-01-20 PROCEDURE — 2060000000 HC ICU INTERMEDIATE R&B

## 2024-01-20 PROCEDURE — 2700000000 HC OXYGEN THERAPY PER DAY

## 2024-01-20 PROCEDURE — 6370000000 HC RX 637 (ALT 250 FOR IP): Performed by: SPECIALIST

## 2024-01-20 PROCEDURE — 2580000003 HC RX 258: Performed by: STUDENT IN AN ORGANIZED HEALTH CARE EDUCATION/TRAINING PROGRAM

## 2024-01-20 PROCEDURE — 6370000000 HC RX 637 (ALT 250 FOR IP): Performed by: INTERNAL MEDICINE

## 2024-01-20 PROCEDURE — 99232 SBSQ HOSP IP/OBS MODERATE 35: CPT | Performed by: STUDENT IN AN ORGANIZED HEALTH CARE EDUCATION/TRAINING PROGRAM

## 2024-01-20 PROCEDURE — 85025 COMPLETE CBC W/AUTO DIFF WBC: CPT

## 2024-01-20 PROCEDURE — 6360000002 HC RX W HCPCS: Performed by: SPECIALIST

## 2024-01-20 RX ADMIN — FAMOTIDINE 20 MG: 20 TABLET ORAL at 10:50

## 2024-01-20 RX ADMIN — PREDNISONE 40 MG: 20 TABLET ORAL at 10:50

## 2024-01-20 RX ADMIN — BUDESONIDE 1000 MCG: 0.5 SUSPENSION RESPIRATORY (INHALATION) at 08:42

## 2024-01-20 RX ADMIN — BUDESONIDE 1000 MCG: 0.5 SUSPENSION RESPIRATORY (INHALATION) at 20:16

## 2024-01-20 RX ADMIN — IPRATROPIUM BROMIDE AND ALBUTEROL SULFATE 1 DOSE: .5; 2.5 SOLUTION RESPIRATORY (INHALATION) at 20:16

## 2024-01-20 RX ADMIN — SODIUM CHLORIDE, PRESERVATIVE FREE 10 ML: 5 INJECTION INTRAVENOUS at 10:50

## 2024-01-20 RX ADMIN — ARFORMOTEROL TARTRATE 15 MCG: 15 SOLUTION RESPIRATORY (INHALATION) at 08:42

## 2024-01-20 RX ADMIN — IPRATROPIUM BROMIDE AND ALBUTEROL SULFATE 1 DOSE: .5; 2.5 SOLUTION RESPIRATORY (INHALATION) at 11:39

## 2024-01-20 RX ADMIN — TOBRAMYCIN 300 MG: 300 SOLUTION RESPIRATORY (INHALATION) at 20:30

## 2024-01-20 RX ADMIN — POTASSIUM CHLORIDE 40 MEQ: 1500 TABLET, EXTENDED RELEASE ORAL at 10:50

## 2024-01-20 RX ADMIN — ENOXAPARIN SODIUM 40 MG: 100 INJECTION SUBCUTANEOUS at 10:50

## 2024-01-20 RX ADMIN — SALINE NASAL SPRAY 1 SPRAY: 1.5 SOLUTION NASAL at 20:13

## 2024-01-20 RX ADMIN — SODIUM CHLORIDE, PRESERVATIVE FREE 10 ML: 5 INJECTION INTRAVENOUS at 20:12

## 2024-01-20 RX ADMIN — ARFORMOTEROL TARTRATE 15 MCG: 15 SOLUTION RESPIRATORY (INHALATION) at 20:16

## 2024-01-20 RX ADMIN — IPRATROPIUM BROMIDE AND ALBUTEROL SULFATE 1 DOSE: .5; 2.5 SOLUTION RESPIRATORY (INHALATION) at 08:42

## 2024-01-20 RX ADMIN — LEVOFLOXACIN 750 MG: 500 TABLET, FILM COATED ORAL at 10:50

## 2024-01-20 RX ADMIN — TOBRAMYCIN 300 MG: 300 SOLUTION RESPIRATORY (INHALATION) at 08:42

## 2024-01-20 RX ADMIN — FAMOTIDINE 20 MG: 20 TABLET ORAL at 20:12

## 2024-01-20 RX ADMIN — SALINE NASAL SPRAY 1 SPRAY: 1.5 SOLUTION NASAL at 17:05

## 2024-01-20 RX ADMIN — IPRATROPIUM BROMIDE AND ALBUTEROL SULFATE 1 DOSE: .5; 2.5 SOLUTION RESPIRATORY (INHALATION) at 16:20

## 2024-01-20 ASSESSMENT — PAIN SCALES - GENERAL: PAINLEVEL_OUTOF10: 0

## 2024-01-20 NOTE — PROGRESS NOTES
[facility]  [facility]  Progress Note    SUBJECTIVE:  No chief complaint on file.    Patient is tolerating medications. No reported adverse drug reactions.  No nausea, vomiting, diarrhea.  Dyspnea with exertion - got cleaned up in the bathroom and SpO2 was in the 70s on return to bed. Oxygen up to 9LNC      Review of systems:  As stated above in the chief complaint, otherwise negative.    Medications:  Scheduled Meds:   levoFLOXacin  750 mg Oral Daily    tobramycin (PF)  300 mg Nebulization BID RT    predniSONE  40 mg Oral Daily    budesonide  1,000 mcg Nebulization BID RT    arformoterol tartrate  15 mcg Nebulization BID RT    sodium chloride flush  5-40 mL IntraVENous 2 times per day    enoxaparin  40 mg SubCUTAneous Daily    famotidine  20 mg Oral BID    azithromycin  500 mg Oral Once per day on     bumetanide  1 mg Oral Once per day on     ipratropium 0.5 mg-albuterol 2.5 mg  1 Dose Inhalation Q4H WA RT     Continuous Infusions:   sodium chloride       PRN Meds:sodium chloride flush, sodium chloride, potassium chloride **OR** potassium alternative oral replacement **OR** potassium chloride, magnesium sulfate, ondansetron **OR** ondansetron, polyethylene glycol, acetaminophen **OR** acetaminophen, oxyCODONE-acetaminophen **AND** oxyCODONE    OBJECTIVE:  /72   Pulse (!) 122   Temp 97.9 °F (36.6 °C) (Oral)   Resp 28   Ht 1.854 m (6' 1\")   Wt 82.2 kg (181 lb 3.5 oz)   SpO2 91%   BMI 23.91 kg/m²   Temp  Av.1 °F (36.7 °C)  Min: 97.5 °F (36.4 °C)  Max: 98.8 °F (37.1 °C)  Constitutional: The patient is awake, alert, and oriented. Sitting on the edge of the bed, tripoding. Nursing in room.   Skin: Warm and dry. No rashes were noted.   HEENT: Round and reactive pupils.  Moist mucous membranes.  No ulcerations or thrush.  Neck: Supple to movements.   Chest: No use of accessory muscles to breathe. Symmetrical expansion. Bilateral crackles at the bases &

## 2024-01-20 NOTE — PROGRESS NOTES
[facility]  [address], [address]  [place] [address]      Pulmonary Progress Note      SUBJECTIVE:  claims felt a little worse with breathing, (?) slightly increased cough with minimal sputum production, currently on 10 li HFNC saturating about 89-90%, sitting up in bed. Started on levaquin and tobra nebs, claims using flutter device and incentive spirometer, some nasal congestion and using nasal saline.    OBJECTIVE    Medications    Continuous Infusions:   sodium chloride         Scheduled Meds:   levoFLOXacin  750 mg Oral Daily    tobramycin (PF)  300 mg Nebulization BID RT    predniSONE  40 mg Oral Daily    budesonide  1,000 mcg Nebulization BID RT    arformoterol tartrate  15 mcg Nebulization BID RT    sodium chloride flush  5-40 mL IntraVENous 2 times per day    enoxaparin  40 mg SubCUTAneous Daily    famotidine  20 mg Oral BID    azithromycin  500 mg Oral Once per day on     bumetanide  1 mg Oral Once per day on     ipratropium 0.5 mg-albuterol 2.5 mg  1 Dose Inhalation Q4H WA RT       PRN Meds:sodium chloride flush, sodium chloride, potassium chloride **OR** potassium alternative oral replacement **OR** potassium chloride, magnesium sulfate, ondansetron **OR** ondansetron, polyethylene glycol, acetaminophen **OR** acetaminophen, oxyCODONE-acetaminophen **AND** oxyCODONE    Physical    VITALS:  /72   Pulse (!) 122   Temp 97.9 °F (36.6 °C) (Oral)   Resp 28   Ht 1.854 m (6' 1\")   Wt 82.2 kg (181 lb 3.5 oz)   SpO2 91%   BMI 23.91 kg/m²     24HR INTAKE/OUTPUT:      Intake/Output Summary (Last 24 hours) at 2024 1550  Last data filed at 2024 0118  Gross per 24 hour   Intake --   Output 300 ml   Net -300 ml         24HR PULSE OXIMETRY RANGE:    SpO2  Av.9 %  Min: 87 %  Max: 95 %    General appearance: alert, appears stated age, and cooperative  Lungs: rhonchi bilaterally with cough  Heart:  regular rate and rhythm, S1, S2 normal, no murmur, click, rub or gallop  Abdomen: soft, non-tender; bowel sounds normal; no masses,  no organomegaly  Extremities: extremities normal, atraumatic, no cyanosis or edema  Neurologic: Mental status: Alert, oriented, thought content appropriate    Data    CBC:   Recent Labs     01/19/24  1320 01/20/24  0435   WBC 11.8* 10.1   HGB 19.4* 18.1*   HCT 57.4* 53.6   MCV 88.2 88.6    185         BMP:  Recent Labs     01/19/24  1320 01/20/24  0435    140   K 4.0 3.4*   CL 97* 100   CO2 29 29   BUN 15 19   CREATININE 0.7 0.7      ALB:3,BILIDIR:3,BILITOT:3,ALKPHOS:3)@    PT/INR: No results for input(s): \"PROTIME\", \"INR\" in the last 72 hours.    ABG:   No results for input(s): \"PH\", \"PO2\", \"PCO2\", \"HCO3\", \"BE\", \"O2SAT\", \"METHB\", \"O2HB\", \"COHB\", \"O2CON\", \"HHB\", \"THB\" in the last 72 hours.          Radiology/Other tests reviewed: none    Assessment:     Principal Problem:    Acute hypoxic respiratory failure (HCC)  Active Problems:    COPD exacerbation (HCC)    Dehydration    Parainfluenza infection    Acute respiratory failure with hypoxia (HCC)  Resolved Problems:    * No resolved hospital problems. *      Plan:       Cont with nebs, observe respiratory function  Cont with oxygen, taper as tolerated, observe saturations, CXR to assess lung parenchyma  Cont with steroids, taper as tolerated  Pseudomonas with sputum culture, antibiotics as per ID  Nasal saline tid and see if helps with nasal congestion  Encourage incentive spirometer and flutter device use  OOB to chair as tolerated      Time at the bedside, reviewing labs and radiographs, reviewing notes and consultations, discussing with staff and family was more than 50 minutes.      Thanks for letting us see this patient in consultation.  Please contact us with any questions. Office [phone] or after hours through [employer], x [phone].

## 2024-01-20 NOTE — PROGRESS NOTES
[facility] Hospitalist Progress Note    Admitting Date and Time: 1/14/2024 11:39 PM  Admit Dx: Acute hypoxic respiratory failure (HCC) [J96.01]  COPD exacerbation (HCC) [J44.1]    Subjective:  Patient is being followed for Acute hypoxic respiratory failure (HCC) [J96.01]  COPD exacerbation (HCC) [J44.1]   Pt feels well today  Per RN: no additional concerns    ROS: denies fever, chills, cp, sob, n/v, HA unless stated above.      levoFLOXacin  750 mg Oral Daily    tobramycin (PF)  300 mg Nebulization BID RT    predniSONE  40 mg Oral Daily    budesonide  1,000 mcg Nebulization BID RT    arformoterol tartrate  15 mcg Nebulization BID RT    sodium chloride flush  5-40 mL IntraVENous 2 times per day    enoxaparin  40 mg SubCUTAneous Daily    famotidine  20 mg Oral BID    azithromycin  500 mg Oral Once per day on Mon Wed Fri    bumetanide  1 mg Oral Once per day on Mon Wed Fri    ipratropium 0.5 mg-albuterol 2.5 mg  1 Dose Inhalation Q4H WA RT     sodium chloride flush, 5-40 mL, PRN  sodium chloride, , PRN  potassium chloride, 40 mEq, PRN   Or  potassium alternative oral replacement, 40 mEq, PRN   Or  potassium chloride, 10 mEq, PRN  magnesium sulfate, 2,000 mg, PRN  ondansetron, 4 mg, Q8H PRN   Or  ondansetron, 4 mg, Q6H PRN  polyethylene glycol, 17 g, Daily PRN  acetaminophen, 650 mg, Q6H PRN   Or  acetaminophen, 650 mg, Q6H PRN  oxyCODONE-acetaminophen, 1 tablet, BID PRN   And  oxyCODONE, 2.5 mg, BID PRN         Objective:  /72   Pulse (!) 122   Temp 97.9 °F (36.6 °C) (Oral)   Resp 28   Ht 1.854 m (6' 1\")   Wt 82.2 kg (181 lb 3.5 oz)   SpO2 91%   BMI 23.91 kg/m²     General Appearance: alert and oriented to person, place and time and in NAD, sitting in bed eating lunch  Skin: warm and dry  Head: normocephalic and atraumatic  Eyes: PERRL, EOMI, conjunctivae normal  Neck: neck supple, trachea midline   Pulmonary/Chest: CTAB, no w/r/r, normal air movement, no respiratory distress, 4L

## 2024-01-21 ENCOUNTER — APPOINTMENT (OUTPATIENT)
Dept: GENERAL RADIOLOGY | Age: 74
End: 2024-01-21
Attending: STUDENT IN AN ORGANIZED HEALTH CARE EDUCATION/TRAINING PROGRAM
Payer: MEDICARE

## 2024-01-21 LAB
ANION GAP SERPL CALCULATED.3IONS-SCNC: 10 MMOL/L (ref 7–16)
BASOPHILS # BLD: 0.02 K/UL (ref 0–0.2)
BASOPHILS NFR BLD: 0 % (ref 0–2)
BUN SERPL-MCNC: 15 MG/DL (ref 6–23)
CALCIUM SERPL-MCNC: 9.5 MG/DL (ref 8.6–10.2)
CHLORIDE SERPL-SCNC: 99 MMOL/L (ref 98–107)
CO2 SERPL-SCNC: 26 MMOL/L (ref 22–29)
CREAT SERPL-MCNC: 0.7 MG/DL (ref 0.7–1.2)
EOSINOPHIL # BLD: 0.03 K/UL (ref 0.05–0.5)
EOSINOPHILS RELATIVE PERCENT: 0 % (ref 0–6)
ERYTHROCYTE [DISTWIDTH] IN BLOOD BY AUTOMATED COUNT: 14.1 % (ref 11.5–15)
GFR SERPL CREATININE-BSD FRML MDRD: >60 ML/MIN/1.73M2
GLUCOSE SERPL-MCNC: 103 MG/DL (ref 74–99)
HCT VFR BLD AUTO: 54 % (ref 37–54)
HGB BLD-MCNC: 17.9 G/DL (ref 12.5–16.5)
IMM GRANULOCYTES # BLD AUTO: 0.09 K/UL (ref 0–0.58)
IMM GRANULOCYTES NFR BLD: 1 % (ref 0–5)
LYMPHOCYTES NFR BLD: 1.52 K/UL (ref 1.5–4)
LYMPHOCYTES RELATIVE PERCENT: 16 % (ref 20–42)
MCH RBC QN AUTO: 29.5 PG (ref 26–35)
MCHC RBC AUTO-ENTMCNC: 33.1 G/DL (ref 32–34.5)
MCV RBC AUTO: 89.1 FL (ref 80–99.9)
MONOCYTES NFR BLD: 0.75 K/UL (ref 0.1–0.95)
MONOCYTES NFR BLD: 8 % (ref 2–12)
NEUTROPHILS NFR BLD: 75 % (ref 43–80)
NEUTS SEG NFR BLD: 7.08 K/UL (ref 1.8–7.3)
PLATELET # BLD AUTO: 226 K/UL (ref 130–450)
PMV BLD AUTO: 9.7 FL (ref 7–12)
POTASSIUM SERPL-SCNC: 3.8 MMOL/L (ref 3.5–5)
RBC # BLD AUTO: 6.06 M/UL (ref 3.8–5.8)
SODIUM SERPL-SCNC: 135 MMOL/L (ref 132–146)
WBC OTHER # BLD: 9.5 K/UL (ref 4.5–11.5)

## 2024-01-21 PROCEDURE — 94640 AIRWAY INHALATION TREATMENT: CPT

## 2024-01-21 PROCEDURE — 2580000003 HC RX 258: Performed by: STUDENT IN AN ORGANIZED HEALTH CARE EDUCATION/TRAINING PROGRAM

## 2024-01-21 PROCEDURE — 6360000002 HC RX W HCPCS

## 2024-01-21 PROCEDURE — 6370000000 HC RX 637 (ALT 250 FOR IP): Performed by: STUDENT IN AN ORGANIZED HEALTH CARE EDUCATION/TRAINING PROGRAM

## 2024-01-21 PROCEDURE — 2060000000 HC ICU INTERMEDIATE R&B

## 2024-01-21 PROCEDURE — 99231 SBSQ HOSP IP/OBS SF/LOW 25: CPT | Performed by: STUDENT IN AN ORGANIZED HEALTH CARE EDUCATION/TRAINING PROGRAM

## 2024-01-21 PROCEDURE — 80048 BASIC METABOLIC PNL TOTAL CA: CPT

## 2024-01-21 PROCEDURE — 2700000000 HC OXYGEN THERAPY PER DAY

## 2024-01-21 PROCEDURE — 6370000000 HC RX 637 (ALT 250 FOR IP): Performed by: SPECIALIST

## 2024-01-21 PROCEDURE — 85025 COMPLETE CBC W/AUTO DIFF WBC: CPT

## 2024-01-21 PROCEDURE — 71045 X-RAY EXAM CHEST 1 VIEW: CPT

## 2024-01-21 PROCEDURE — 6360000002 HC RX W HCPCS: Performed by: SPECIALIST

## 2024-01-21 PROCEDURE — 6360000002 HC RX W HCPCS: Performed by: STUDENT IN AN ORGANIZED HEALTH CARE EDUCATION/TRAINING PROGRAM

## 2024-01-21 PROCEDURE — 6370000000 HC RX 637 (ALT 250 FOR IP)

## 2024-01-21 PROCEDURE — 94669 MECHANICAL CHEST WALL OSCILL: CPT

## 2024-01-21 RX ADMIN — IPRATROPIUM BROMIDE AND ALBUTEROL SULFATE 1 DOSE: .5; 2.5 SOLUTION RESPIRATORY (INHALATION) at 20:11

## 2024-01-21 RX ADMIN — IPRATROPIUM BROMIDE AND ALBUTEROL SULFATE 1 DOSE: .5; 2.5 SOLUTION RESPIRATORY (INHALATION) at 09:34

## 2024-01-21 RX ADMIN — SALINE NASAL SPRAY 1 SPRAY: 1.5 SOLUTION NASAL at 09:12

## 2024-01-21 RX ADMIN — BUDESONIDE 1000 MCG: 0.5 SUSPENSION RESPIRATORY (INHALATION) at 20:11

## 2024-01-21 RX ADMIN — BUDESONIDE 1000 MCG: 0.5 SUSPENSION RESPIRATORY (INHALATION) at 09:34

## 2024-01-21 RX ADMIN — FAMOTIDINE 20 MG: 20 TABLET ORAL at 09:07

## 2024-01-21 RX ADMIN — FAMOTIDINE 20 MG: 20 TABLET ORAL at 20:29

## 2024-01-21 RX ADMIN — LEVOFLOXACIN 750 MG: 500 TABLET, FILM COATED ORAL at 09:07

## 2024-01-21 RX ADMIN — TOBRAMYCIN 300 MG: 300 SOLUTION RESPIRATORY (INHALATION) at 20:23

## 2024-01-21 RX ADMIN — SODIUM CHLORIDE, PRESERVATIVE FREE 10 ML: 5 INJECTION INTRAVENOUS at 09:08

## 2024-01-21 RX ADMIN — SALINE NASAL SPRAY 1 SPRAY: 1.5 SOLUTION NASAL at 20:29

## 2024-01-21 RX ADMIN — PREDNISONE 40 MG: 20 TABLET ORAL at 09:07

## 2024-01-21 RX ADMIN — ARFORMOTEROL TARTRATE 15 MCG: 15 SOLUTION RESPIRATORY (INHALATION) at 09:34

## 2024-01-21 RX ADMIN — IPRATROPIUM BROMIDE AND ALBUTEROL SULFATE 1 DOSE: .5; 2.5 SOLUTION RESPIRATORY (INHALATION) at 12:54

## 2024-01-21 RX ADMIN — SODIUM CHLORIDE, PRESERVATIVE FREE 10 ML: 5 INJECTION INTRAVENOUS at 20:29

## 2024-01-21 RX ADMIN — TOBRAMYCIN 300 MG: 300 SOLUTION RESPIRATORY (INHALATION) at 09:21

## 2024-01-21 RX ADMIN — ARFORMOTEROL TARTRATE 15 MCG: 15 SOLUTION RESPIRATORY (INHALATION) at 20:10

## 2024-01-21 RX ADMIN — ENOXAPARIN SODIUM 40 MG: 100 INJECTION SUBCUTANEOUS at 09:08

## 2024-01-21 ASSESSMENT — PAIN SCALES - GENERAL: PAINLEVEL_OUTOF10: 0

## 2024-01-21 NOTE — PROGRESS NOTES
[facility]  [facility]  Progress Note    SUBJECTIVE:  No chief complaint on file.    Patient is tolerating medications. No reported adverse drug reactions.  Sitting up in bed, on the phone  Had a breathing treatment and is coughing some  On 10LNC this morning     Review of systems:  As stated above in the chief complaint, otherwise negative.    Medications:  Scheduled Meds:   sodium chloride  1 spray Each Nostril TID    levoFLOXacin  750 mg Oral Daily    tobramycin (PF)  300 mg Nebulization BID RT    predniSONE  40 mg Oral Daily    budesonide  1,000 mcg Nebulization BID RT    arformoterol tartrate  15 mcg Nebulization BID RT    sodium chloride flush  5-40 mL IntraVENous 2 times per day    enoxaparin  40 mg SubCUTAneous Daily    famotidine  20 mg Oral BID    azithromycin  500 mg Oral Once per day on     bumetanide  1 mg Oral Once per day on     ipratropium 0.5 mg-albuterol 2.5 mg  1 Dose Inhalation Q4H WA RT     Continuous Infusions:   sodium chloride       PRN Meds:sodium chloride flush, sodium chloride, potassium chloride **OR** potassium alternative oral replacement **OR** potassium chloride, magnesium sulfate, ondansetron **OR** ondansetron, polyethylene glycol, acetaminophen **OR** acetaminophen, oxyCODONE-acetaminophen **AND** oxyCODONE    OBJECTIVE:  BP (!) 100/58   Pulse 97   Temp 98.9 °F (37.2 °C) (Oral)   Resp 19   Ht 1.854 m (6' 1\")   Wt 82.2 kg (181 lb 3.5 oz)   SpO2 94%   BMI 23.91 kg/m²   Temp  Av.3 °F (36.8 °C)  Min: 98 °F (36.7 °C)  Max: 98.9 °F (37.2 °C)  Constitutional: The patient is awake, alert, and oriented. Sitting up in bed, in no distress. On the phone   Skin: Warm and dry. No rashes were noted.   HEENT: Round and reactive pupils.  Moist mucous membranes.  No ulcerations or thrush.  Neck: Supple to movements.   Chest: No use of accessory muscles to breathe. Symmetrical expansion. Bibasilar crackles, worse on the right, wheezing

## 2024-01-21 NOTE — PROGRESS NOTES
[facility]  [address], [address]  [place] [address]      Pulmonary Progress Note      SUBJECTIVE:  claims slightly better with breathing and coughing slightly less, still on 10 li HFNC saturating about 89-90%, sitting up in bed. Slightly better with nasal congestion with nasal saline.    OBJECTIVE    Medications    Continuous Infusions:   sodium chloride         Scheduled Meds:   sodium chloride  1 spray Each Nostril TID    levoFLOXacin  750 mg Oral Daily    tobramycin (PF)  300 mg Nebulization BID RT    predniSONE  40 mg Oral Daily    budesonide  1,000 mcg Nebulization BID RT    arformoterol tartrate  15 mcg Nebulization BID RT    sodium chloride flush  5-40 mL IntraVENous 2 times per day    enoxaparin  40 mg SubCUTAneous Daily    famotidine  20 mg Oral BID    azithromycin  500 mg Oral Once per day on     bumetanide  1 mg Oral Once per day on     ipratropium 0.5 mg-albuterol 2.5 mg  1 Dose Inhalation Q4H WA RT       PRN Meds:sodium chloride flush, sodium chloride, potassium chloride **OR** potassium alternative oral replacement **OR** potassium chloride, magnesium sulfate, ondansetron **OR** ondansetron, polyethylene glycol, acetaminophen **OR** acetaminophen, oxyCODONE-acetaminophen **AND** oxyCODONE    Physical    VITALS:  /70   Pulse 98   Temp 98.1 °F (36.7 °C) (Oral)   Resp 20   Ht 1.854 m (6' 1\")   Wt 82.2 kg (181 lb 3.5 oz)   SpO2 92%   BMI 23.91 kg/m²     24HR INTAKE/OUTPUT:      Intake/Output Summary (Last 24 hours) at 2024 1601  Last data filed at 2024 0908  Gross per 24 hour   Intake 10 ml   Output --   Net 10 ml         24HR PULSE OXIMETRY RANGE:    SpO2  Av.4 %  Min: 90 %  Max: 94 %    General appearance: alert, appears stated age, and cooperative  Lungs: rhonchi bilaterally with cough  Heart: regular rate and rhythm, S1, S2 normal, no murmur, click, rub or gallop  Abdomen: soft,

## 2024-01-21 NOTE — PROGRESS NOTES
[facility] Hospitalist Progress Note    Admitting Date and Time: 1/14/2024 11:39 PM  Admit Dx: Acute hypoxic respiratory failure (HCC) [J96.01]  COPD exacerbation (HCC) [J44.1]    Subjective:  Patient is being followed for Acute hypoxic respiratory failure (HCC) [J96.01]  COPD exacerbation (HCC) [J44.1]   Pt feels well today  Per RN: no additional concerns    ROS: denies fever, chills, cp, sob, n/v, HA unless stated above.      sodium chloride  1 spray Each Nostril TID    levoFLOXacin  750 mg Oral Daily    tobramycin (PF)  300 mg Nebulization BID RT    predniSONE  40 mg Oral Daily    budesonide  1,000 mcg Nebulization BID RT    arformoterol tartrate  15 mcg Nebulization BID RT    sodium chloride flush  5-40 mL IntraVENous 2 times per day    enoxaparin  40 mg SubCUTAneous Daily    famotidine  20 mg Oral BID    azithromycin  500 mg Oral Once per day on Mon Wed Fri    bumetanide  1 mg Oral Once per day on Mon Wed Fri    ipratropium 0.5 mg-albuterol 2.5 mg  1 Dose Inhalation Q4H WA RT     sodium chloride flush, 5-40 mL, PRN  sodium chloride, , PRN  potassium chloride, 40 mEq, PRN   Or  potassium alternative oral replacement, 40 mEq, PRN   Or  potassium chloride, 10 mEq, PRN  magnesium sulfate, 2,000 mg, PRN  ondansetron, 4 mg, Q8H PRN   Or  ondansetron, 4 mg, Q6H PRN  polyethylene glycol, 17 g, Daily PRN  acetaminophen, 650 mg, Q6H PRN   Or  acetaminophen, 650 mg, Q6H PRN  oxyCODONE-acetaminophen, 1 tablet, BID PRN   And  oxyCODONE, 2.5 mg, BID PRN         Objective:  /68   Pulse (!) 113   Temp 98.1 °F (36.7 °C) (Oral)   Resp 22   Ht 1.854 m (6' 1\")   Wt 82.2 kg (181 lb 3.5 oz)   SpO2 92%   BMI 23.91 kg/m²     General Appearance: alert and oriented to person, place and time and in NAD, sitting in bed eating lunch  Skin: warm and dry  Head: normocephalic and atraumatic  Eyes: PERRL, EOMI, conjunctivae normal  Neck: neck supple, trachea midline   Pulmonary/Chest: CTAB, no w/r/r, normal air movement,  software. Every effort was made to ensure accuracy; however, inadvertent computerized transcription errors may be present.  Electronically signed by Glenys Christine MD on 1/21/2024 at 1:59 PM

## 2024-01-21 NOTE — PROGRESS NOTES
[facility] Quality Flow/Interdisciplinary Rounds Progress Note        Quality Flow Rounds held on January 21, 2024    Disciplines Attending:  Bedside Nurse and Nursing Unit Leadership    Jason Cox was admitted on 1/14/2024 11:39 PM    Anticipated Discharge Date:  Expected Discharge Date: 01/19/24    Disposition:    Sandro Score:  Sandro Scale Score: 21    Readmission Risk              Risk of Unplanned Readmission:  21           Discussed patient goal for the day, patient clinical progression, and barriers to discharge.  The following Goal(s) of the Day/Commitment(s) have been identified:   wean O2, atb.       Kristin Richmond RN  January 21, 2024

## 2024-01-22 LAB
ANION GAP SERPL CALCULATED.3IONS-SCNC: 10 MMOL/L (ref 7–16)
BASOPHILS # BLD: 0.01 K/UL (ref 0–0.2)
BASOPHILS NFR BLD: 0 % (ref 0–2)
BUN SERPL-MCNC: 14 MG/DL (ref 6–23)
CALCIUM SERPL-MCNC: 9.7 MG/DL (ref 8.6–10.2)
CHLORIDE SERPL-SCNC: 101 MMOL/L (ref 98–107)
CO2 SERPL-SCNC: 25 MMOL/L (ref 22–29)
CREAT SERPL-MCNC: 0.6 MG/DL (ref 0.7–1.2)
EOSINOPHIL # BLD: 0.03 K/UL (ref 0.05–0.5)
EOSINOPHILS RELATIVE PERCENT: 0 % (ref 0–6)
ERYTHROCYTE [DISTWIDTH] IN BLOOD BY AUTOMATED COUNT: 14.1 % (ref 11.5–15)
GFR SERPL CREATININE-BSD FRML MDRD: >60 ML/MIN/1.73M2
GLUCOSE SERPL-MCNC: 92 MG/DL (ref 74–99)
HCT VFR BLD AUTO: 52.9 % (ref 37–54)
HGB BLD-MCNC: 17.6 G/DL (ref 12.5–16.5)
IMM GRANULOCYTES # BLD AUTO: 0.12 K/UL (ref 0–0.58)
IMM GRANULOCYTES NFR BLD: 2 % (ref 0–5)
LYMPHOCYTES NFR BLD: 1.34 K/UL (ref 1.5–4)
LYMPHOCYTES RELATIVE PERCENT: 17 % (ref 20–42)
MCH RBC QN AUTO: 29.8 PG (ref 26–35)
MCHC RBC AUTO-ENTMCNC: 33.3 G/DL (ref 32–34.5)
MCV RBC AUTO: 89.5 FL (ref 80–99.9)
MONOCYTES NFR BLD: 0.66 K/UL (ref 0.1–0.95)
MONOCYTES NFR BLD: 8 % (ref 2–12)
NEUTROPHILS NFR BLD: 73 % (ref 43–80)
NEUTS SEG NFR BLD: 5.96 K/UL (ref 1.8–7.3)
PLATELET # BLD AUTO: 230 K/UL (ref 130–450)
PMV BLD AUTO: 10 FL (ref 7–12)
POTASSIUM SERPL-SCNC: 4 MMOL/L (ref 3.5–5)
RBC # BLD AUTO: 5.91 M/UL (ref 3.8–5.8)
SODIUM SERPL-SCNC: 136 MMOL/L (ref 132–146)
WBC OTHER # BLD: 8.1 K/UL (ref 4.5–11.5)

## 2024-01-22 PROCEDURE — 99231 SBSQ HOSP IP/OBS SF/LOW 25: CPT | Performed by: STUDENT IN AN ORGANIZED HEALTH CARE EDUCATION/TRAINING PROGRAM

## 2024-01-22 PROCEDURE — 6360000002 HC RX W HCPCS

## 2024-01-22 PROCEDURE — 2580000003 HC RX 258: Performed by: STUDENT IN AN ORGANIZED HEALTH CARE EDUCATION/TRAINING PROGRAM

## 2024-01-22 PROCEDURE — 36415 COLL VENOUS BLD VENIPUNCTURE: CPT

## 2024-01-22 PROCEDURE — 6370000000 HC RX 637 (ALT 250 FOR IP): Performed by: STUDENT IN AN ORGANIZED HEALTH CARE EDUCATION/TRAINING PROGRAM

## 2024-01-22 PROCEDURE — 94640 AIRWAY INHALATION TREATMENT: CPT

## 2024-01-22 PROCEDURE — 6370000000 HC RX 637 (ALT 250 FOR IP)

## 2024-01-22 PROCEDURE — 2060000000 HC ICU INTERMEDIATE R&B

## 2024-01-22 PROCEDURE — 85025 COMPLETE CBC W/AUTO DIFF WBC: CPT

## 2024-01-22 PROCEDURE — 80048 BASIC METABOLIC PNL TOTAL CA: CPT

## 2024-01-22 PROCEDURE — 6360000002 HC RX W HCPCS: Performed by: SPECIALIST

## 2024-01-22 PROCEDURE — 2700000000 HC OXYGEN THERAPY PER DAY

## 2024-01-22 PROCEDURE — 6370000000 HC RX 637 (ALT 250 FOR IP): Performed by: SPECIALIST

## 2024-01-22 PROCEDURE — 6360000002 HC RX W HCPCS: Performed by: STUDENT IN AN ORGANIZED HEALTH CARE EDUCATION/TRAINING PROGRAM

## 2024-01-22 RX ADMIN — ARFORMOTEROL TARTRATE 15 MCG: 15 SOLUTION RESPIRATORY (INHALATION) at 20:51

## 2024-01-22 RX ADMIN — SODIUM CHLORIDE, PRESERVATIVE FREE 10 ML: 5 INJECTION INTRAVENOUS at 08:06

## 2024-01-22 RX ADMIN — BUMETANIDE 1 MG: 1 TABLET ORAL at 08:03

## 2024-01-22 RX ADMIN — BUDESONIDE 1000 MCG: 0.5 SUSPENSION RESPIRATORY (INHALATION) at 09:09

## 2024-01-22 RX ADMIN — AZITHROMYCIN 500 MG: 250 TABLET, FILM COATED ORAL at 08:03

## 2024-01-22 RX ADMIN — SALINE NASAL SPRAY 1 SPRAY: 1.5 SOLUTION NASAL at 13:48

## 2024-01-22 RX ADMIN — BUDESONIDE 1000 MCG: 0.5 SUSPENSION RESPIRATORY (INHALATION) at 20:51

## 2024-01-22 RX ADMIN — ARFORMOTEROL TARTRATE 15 MCG: 15 SOLUTION RESPIRATORY (INHALATION) at 09:09

## 2024-01-22 RX ADMIN — PREDNISONE 40 MG: 20 TABLET ORAL at 08:03

## 2024-01-22 RX ADMIN — FAMOTIDINE 20 MG: 20 TABLET ORAL at 08:03

## 2024-01-22 RX ADMIN — IPRATROPIUM BROMIDE AND ALBUTEROL SULFATE 1 DOSE: .5; 2.5 SOLUTION RESPIRATORY (INHALATION) at 09:09

## 2024-01-22 RX ADMIN — LEVOFLOXACIN 750 MG: 500 TABLET, FILM COATED ORAL at 08:05

## 2024-01-22 RX ADMIN — SALINE NASAL SPRAY 1 SPRAY: 1.5 SOLUTION NASAL at 21:00

## 2024-01-22 RX ADMIN — IPRATROPIUM BROMIDE AND ALBUTEROL SULFATE 1 DOSE: .5; 2.5 SOLUTION RESPIRATORY (INHALATION) at 17:38

## 2024-01-22 RX ADMIN — TOBRAMYCIN 300 MG: 300 SOLUTION RESPIRATORY (INHALATION) at 21:13

## 2024-01-22 RX ADMIN — TOBRAMYCIN 300 MG: 300 SOLUTION RESPIRATORY (INHALATION) at 08:59

## 2024-01-22 RX ADMIN — SODIUM CHLORIDE, PRESERVATIVE FREE 10 ML: 5 INJECTION INTRAVENOUS at 21:00

## 2024-01-22 RX ADMIN — SALINE NASAL SPRAY 1 SPRAY: 1.5 SOLUTION NASAL at 08:06

## 2024-01-22 RX ADMIN — IPRATROPIUM BROMIDE AND ALBUTEROL SULFATE 1 DOSE: .5; 2.5 SOLUTION RESPIRATORY (INHALATION) at 20:51

## 2024-01-22 RX ADMIN — FAMOTIDINE 20 MG: 20 TABLET ORAL at 21:00

## 2024-01-22 RX ADMIN — ENOXAPARIN SODIUM 40 MG: 100 INJECTION SUBCUTANEOUS at 08:04

## 2024-01-22 ASSESSMENT — PAIN SCALES - GENERAL: PAINLEVEL_OUTOF10: 0

## 2024-01-22 NOTE — PROGRESS NOTES
Pulmonary Progress Note    Admit Date: 2024  Hospital day                               PCP: Vinny Issa MD    Chief Complaint (s):  Patient Active Problem List   Diagnosis    COPD (chronic obstructive pulmonary disease) (HCC)    Thoracic ascending aortic aneurysm    S/P lumbar fusion    Hypophosphatemia    Glucose intolerance    Hilar adenopathy    Pulmonary Mycobacterium avium complex (MAC) infection (HCC)    Chronic respiratory failure with hypoxia (HCC)    Chronic pain syndrome    Lumbar degenerative disc disease    Chronic low back pain    Immunocompromised (HCC)    Iron deficiency    Pulmonary hypertension (HCC)    Chronic diastolic heart failure (HCC)    COPD exacerbation (HCC)    Acute hypoxic respiratory failure (HCC)    Dehydration    Parainfluenza infection    Acute respiratory failure with hypoxia (HCC)       Subjective:  Some bronchospasm this p.m.  Unfortunately, when the patient desaturates well-intentioned healthcare workers are turning up his oxygen which precipitates shunting to nonventilated areas.  It becomes that much more difficult than to wean him down.      Vitals:  VITALS:  /74   Pulse 96   Temp 98.3 °F (36.8 °C) (Oral)   Resp 20   Ht 1.854 m (6' 1\")   Wt 81.4 kg (179 lb 8 oz)   SpO2 95%   BMI 23.68 kg/m²     24HR INTAKE/OUTPUT:      Intake/Output Summary (Last 24 hours) at 2024 1737  Last data filed at 2024 0222  Gross per 24 hour   Intake --   Output 450 ml   Net -450 ml         24HR PULSE OXIMETRY RANGE:    SpO2  Av.8 %  Min: 94 %  Max: 96 %    Medications:  IV:   sodium chloride         Scheduled Meds:   sodium chloride  1 spray Each Nostril TID    levoFLOXacin  750 mg Oral Daily    tobramycin (PF)  300 mg Nebulization BID RT    predniSONE  40 mg Oral Daily    budesonide  1,000 mcg Nebulization BID RT    arformoterol tartrate  15 mcg Nebulization BID RT    sodium chloride flush  5-40 mL IntraVENous 2 times per day    enoxaparin  40 mg  Films:  XR CHEST PORTABLE   Final Result   1.  Minimal bibasilar parenchymal densities concerning for atelectasis.      2.  Advanced underlying signs of chronic obstructive pulmonary disease.             Assessment:  Acute exacerbation of COPD secondary to parainfluenza 4  History of colonization with MAC  History of colonization with Pseudomonas  No evidence of alpha-1 antitrypsin deficiency, this has been tested multiple times    Plan:  Pseudomonas treatment per ID  Prednisone taper  Continue aerosols  Continue supplemental oxygen and wean as tolerated. Goal SpO2 is 88%-92%.   Flutter valve.     Time at the bedside, reviewing labs and radiographs, reviewing updated notes and consultations, discussing with staff and family was more than 35 minutes.    Please note that voice recognition technology was used in the preparation of this note and make therefore it may contain inadvertent transcription errors.  If the patient is a COVID 19 isolation patient, the above physical exam reflects that of the examining physician for the day.      Rolf Little MD,  M.D., F.C.C.P.  [facility]    [facility], [address] [place] [address], [address], [place] [address]  Office visits:  [address] [place] [address]

## 2024-01-22 NOTE — PROGRESS NOTES
[facility]  [facility]  Progress Note    SUBJECTIVE:  No chief complaint on file.    Patient is tolerating medications. No reported adverse drug reactions.  Patient is sitting up resting in bed, complaining of some loose stools  Reports he is feeling okay  On 10LNC this morning   No fevers overnight    Review of systems:  As stated above in the chief complaint, otherwise negative.    Medications:  Scheduled Meds:   sodium chloride  1 spray Each Nostril TID    levoFLOXacin  750 mg Oral Daily    tobramycin (PF)  300 mg Nebulization BID RT    predniSONE  40 mg Oral Daily    budesonide  1,000 mcg Nebulization BID RT    arformoterol tartrate  15 mcg Nebulization BID RT    sodium chloride flush  5-40 mL IntraVENous 2 times per day    enoxaparin  40 mg SubCUTAneous Daily    famotidine  20 mg Oral BID    azithromycin  500 mg Oral Once per day on     bumetanide  1 mg Oral Once per day on     ipratropium 0.5 mg-albuterol 2.5 mg  1 Dose Inhalation Q4H WA RT     Continuous Infusions:   sodium chloride       PRN Meds:sodium chloride flush, sodium chloride, potassium chloride **OR** potassium alternative oral replacement **OR** potassium chloride, magnesium sulfate, ondansetron **OR** ondansetron, polyethylene glycol, acetaminophen **OR** acetaminophen, oxyCODONE-acetaminophen **AND** oxyCODONE    OBJECTIVE:  /77   Pulse 74   Temp 97.7 °F (36.5 °C) (Oral)   Resp 20   Ht 1.854 m (6' 1\")   Wt 81.4 kg (179 lb 8 oz)   SpO2 95%   BMI 23.68 kg/m²   Temp  Av.1 °F (36.7 °C)  Min: 97.7 °F (36.5 °C)  Max: 98.4 °F (36.9 °C)  Constitutional: The patient is awake, alert, and oriented. Sitting up in bed, in no distress.   Skin: Warm and dry. No rashes were noted.   HEENT: Round and reactive pupils.  Moist mucous membranes.  No ulcerations or thrush.  Neck: Supple to movements.   Chest: No use of accessory muscles to breathe. Symmetrical expansion. Bibasilar crackles, worse

## 2024-01-22 NOTE — PROGRESS NOTES
[facility] Quality Flow/Interdisciplinary Rounds Progress Note        Quality Flow Rounds held on January 22, 2024    Disciplines Attending:  Bedside Nurse, , , and Nursing Unit Leadership    Jason Cox was admitted on 1/14/2024 11:39 PM    Anticipated Discharge Date:  Expected Discharge Date: 01/19/24    Disposition:    Sandro Score:  Sandro Scale Score: 21    Readmission Risk              Risk of Unplanned Readmission:  21           Discussed patient goal for the day, patient clinical progression, and barriers to discharge.  The following Goal(s) of the Day/Commitment(s) have been identified:   wean oxygen as able, inhaled ABX, po abx and steorid.katy Cano RN  January 22, 2024

## 2024-01-22 NOTE — PROGRESS NOTES
transcribed using voice recognition software. Every effort was made to ensure accuracy; however, inadvertent computerized transcription errors may be present.  Electronically signed by Glenys Christine MD on 1/22/2024 at 11:37 AM

## 2024-01-22 NOTE — CARE COORDINATION
Copd exac w/parainfluenza, steroids, levaquin and azithromax po w/inhaled tobramycin. Pt currently requiring 10l HF. Pt will return home upon discharge where he is I, has both a cane and walker if needed. Pt has home O2 4 - 4.5l as needed during the day and continuously HS via [employer]. He has an inogen and a concentrator at home that goes up to 10l and has a nebulizer. Pt states he will only use the inogen and not use a portable tank if greater than 5l l is needed. Will follow.   ERICK ColonN, RN  [facility] Case Management  [phone]

## 2024-01-22 NOTE — PROGRESS NOTES
Continuous pox showing pt at 83%. When arrived to room pt was sitting on side of bed. States he was just up in the bathroom for several minutes and was cleaning up table and moving things in room around. Pt recovered to 90% on 10 liters after 10 minutes of rest.

## 2024-01-23 PROCEDURE — 6370000000 HC RX 637 (ALT 250 FOR IP)

## 2024-01-23 PROCEDURE — 99232 SBSQ HOSP IP/OBS MODERATE 35: CPT | Performed by: STUDENT IN AN ORGANIZED HEALTH CARE EDUCATION/TRAINING PROGRAM

## 2024-01-23 PROCEDURE — 6360000002 HC RX W HCPCS

## 2024-01-23 PROCEDURE — 2060000000 HC ICU INTERMEDIATE R&B

## 2024-01-23 PROCEDURE — 6360000002 HC RX W HCPCS: Performed by: STUDENT IN AN ORGANIZED HEALTH CARE EDUCATION/TRAINING PROGRAM

## 2024-01-23 PROCEDURE — 6370000000 HC RX 637 (ALT 250 FOR IP): Performed by: STUDENT IN AN ORGANIZED HEALTH CARE EDUCATION/TRAINING PROGRAM

## 2024-01-23 PROCEDURE — 2580000003 HC RX 258: Performed by: STUDENT IN AN ORGANIZED HEALTH CARE EDUCATION/TRAINING PROGRAM

## 2024-01-23 PROCEDURE — 6370000000 HC RX 637 (ALT 250 FOR IP): Performed by: SPECIALIST

## 2024-01-23 PROCEDURE — 94669 MECHANICAL CHEST WALL OSCILL: CPT

## 2024-01-23 PROCEDURE — 2500000003 HC RX 250 WO HCPCS: Performed by: STUDENT IN AN ORGANIZED HEALTH CARE EDUCATION/TRAINING PROGRAM

## 2024-01-23 PROCEDURE — 2700000000 HC OXYGEN THERAPY PER DAY

## 2024-01-23 PROCEDURE — 6360000002 HC RX W HCPCS: Performed by: SPECIALIST

## 2024-01-23 PROCEDURE — 94640 AIRWAY INHALATION TREATMENT: CPT

## 2024-01-23 RX ORDER — GUAIFENESIN 200 MG/10ML
100 LIQUID ORAL EVERY 6 HOURS PRN
Status: DISCONTINUED | OUTPATIENT
Start: 2024-01-23 | End: 2024-01-25 | Stop reason: HOSPADM

## 2024-01-23 RX ADMIN — SALINE NASAL SPRAY 1 SPRAY: 1.5 SOLUTION NASAL at 10:01

## 2024-01-23 RX ADMIN — IPRATROPIUM BROMIDE AND ALBUTEROL SULFATE 1 DOSE: .5; 2.5 SOLUTION RESPIRATORY (INHALATION) at 13:57

## 2024-01-23 RX ADMIN — SALINE NASAL SPRAY 1 SPRAY: 1.5 SOLUTION NASAL at 20:21

## 2024-01-23 RX ADMIN — IPRATROPIUM BROMIDE AND ALBUTEROL SULFATE 1 DOSE: .5; 2.5 SOLUTION RESPIRATORY (INHALATION) at 16:51

## 2024-01-23 RX ADMIN — IPRATROPIUM BROMIDE AND ALBUTEROL SULFATE 1 DOSE: .5; 2.5 SOLUTION RESPIRATORY (INHALATION) at 09:09

## 2024-01-23 RX ADMIN — SODIUM CHLORIDE, PRESERVATIVE FREE 10 ML: 5 INJECTION INTRAVENOUS at 20:19

## 2024-01-23 RX ADMIN — TOBRAMYCIN 300 MG: 300 SOLUTION RESPIRATORY (INHALATION) at 09:35

## 2024-01-23 RX ADMIN — BUDESONIDE 1000 MCG: 0.5 SUSPENSION RESPIRATORY (INHALATION) at 21:18

## 2024-01-23 RX ADMIN — ENOXAPARIN SODIUM 40 MG: 100 INJECTION SUBCUTANEOUS at 10:01

## 2024-01-23 RX ADMIN — FAMOTIDINE 20 MG: 20 TABLET ORAL at 10:00

## 2024-01-23 RX ADMIN — ARFORMOTEROL TARTRATE 15 MCG: 15 SOLUTION RESPIRATORY (INHALATION) at 09:10

## 2024-01-23 RX ADMIN — BUDESONIDE 1000 MCG: 0.5 SUSPENSION RESPIRATORY (INHALATION) at 09:10

## 2024-01-23 RX ADMIN — SODIUM CHLORIDE, PRESERVATIVE FREE 10 ML: 5 INJECTION INTRAVENOUS at 10:01

## 2024-01-23 RX ADMIN — TOBRAMYCIN 300 MG: 300 SOLUTION RESPIRATORY (INHALATION) at 20:59

## 2024-01-23 RX ADMIN — FAMOTIDINE 20 MG: 20 TABLET ORAL at 20:19

## 2024-01-23 RX ADMIN — ARFORMOTEROL TARTRATE 15 MCG: 15 SOLUTION RESPIRATORY (INHALATION) at 21:18

## 2024-01-23 RX ADMIN — IPRATROPIUM BROMIDE AND ALBUTEROL SULFATE 1 DOSE: .5; 2.5 SOLUTION RESPIRATORY (INHALATION) at 21:18

## 2024-01-23 RX ADMIN — GUAIFENESIN 100 MG: 200 SOLUTION ORAL at 12:24

## 2024-01-23 RX ADMIN — PREDNISONE 40 MG: 20 TABLET ORAL at 10:00

## 2024-01-23 RX ADMIN — SALINE NASAL SPRAY 1 SPRAY: 1.5 SOLUTION NASAL at 14:35

## 2024-01-23 RX ADMIN — LEVOFLOXACIN 750 MG: 500 TABLET, FILM COATED ORAL at 10:00

## 2024-01-23 ASSESSMENT — PAIN SCALES - GENERAL: PAINLEVEL_OUTOF10: 0

## 2024-01-23 NOTE — CARE COORDINATION
Transition of care update: Copd exac w/parainfluenza, steroids, levaquin and azithromax po w/inhaled tobramycin. Pt remains independent in the room. Pt has both a cane and walker if needed. O2 currently at 6l. [facility] supplies his oxygen. He has an inogen, concentrator that can accommodate 10l and a nebulizer. [facility] notified of the possibility of pt needed a portable tank delivered to hospital in the event he requires a higher liter flow upon discharge. New testing and orders will be needed. Will follow pending further treatment plans.  Lorraine Hernandez, ERICKN, RN  [facility] Case Management  [phone]

## 2024-01-23 NOTE — PROGRESS NOTES
[facility]  [facility]  Progress Note    SUBJECTIVE:  No chief complaint on file.    Patient is tolerating medications. No reported adverse drug reactions.  Up in the bathroom   Down to 6LNC today - breathing is feeling better  No fevers     Review of systems:  As stated above in the chief complaint, otherwise negative.    Medications:  Scheduled Meds:   sodium chloride  1 spray Each Nostril TID    levoFLOXacin  750 mg Oral Daily    tobramycin (PF)  300 mg Nebulization BID RT    predniSONE  40 mg Oral Daily    budesonide  1,000 mcg Nebulization BID RT    arformoterol tartrate  15 mcg Nebulization BID RT    sodium chloride flush  5-40 mL IntraVENous 2 times per day    enoxaparin  40 mg SubCUTAneous Daily    famotidine  20 mg Oral BID    azithromycin  500 mg Oral Once per day on     bumetanide  1 mg Oral Once per day on     ipratropium 0.5 mg-albuterol 2.5 mg  1 Dose Inhalation Q4H WA RT     Continuous Infusions:   sodium chloride       PRN Meds:sodium chloride flush, sodium chloride, potassium chloride **OR** potassium alternative oral replacement **OR** potassium chloride, magnesium sulfate, ondansetron **OR** ondansetron, polyethylene glycol, acetaminophen **OR** acetaminophen, oxyCODONE-acetaminophen **AND** oxyCODONE    OBJECTIVE:  /64   Pulse 90   Temp 97.6 °F (36.4 °C) (Oral)   Resp 19   Ht 1.854 m (6' 1\")   Wt 82.9 kg (182 lb 12.2 oz)   SpO2 92%   BMI 24.11 kg/m²   Temp  Av.7 °F (36.5 °C)  Min: 97 °F (36.1 °C)  Max: 98.4 °F (36.9 °C)  Constitutional: The patient is awake, alert, and oriented. Up int he bathroom, getting washed up  Skin: Warm and dry. No rashes were noted.   HEENT: Round and reactive pupils.  Moist mucous membranes.  No ulcerations or thrush.  Neck: Supple to movements.   Chest: No use of accessory muscles to breathe. Symmetrical expansion. Bibasilar crackles, some exp wheezing in the upper lobes  Cardiovascular: S1 and S2  are rhythmic and regular. No murmurs appreciated.   Abdomen: Positive bowel sounds to auscultation. Benign to palpation. No masses felt.   Extremities: No edema.  Lines: Peripheral.    Laboratory and Tests:  Lab Results   Component Value Date    CRP 31.0 (H) 01/20/2024    CRP 4.0 08/10/2023    CRP 8.0 (H) 08/09/2023     Lab Results   Component Value Date    SEDRATE 1 01/20/2024    SEDRATE 1 01/14/2024    SEDRATE 2 08/10/2023       Radiology:  Reviewed     Microbiology:   Respiratory panel 1/14/2024: Parainfluenza 4  Rapid SARS-CoV-2: Negative  Rapid RSV: Negative  Rapid SARS-CoV-2: Negative  Blood cultures 1/14/2024: Negative so far  Stool for C. difficile 1/15/2024: Negative  Respiratory culture 1/17/2024: Moderate pansensitive Pseudomonas aeruginosa    ASSESSMENT:  Exacerbation COPD  Bronchitis secondary to Pseudomonas  Parainfluenza 4 virus infection  Fever associated to the above  History of MAC, currently on suppressive Azithromycin    PLAN:  Continue oral Levofloxacin 750mg po daily  Continue inhaled Tobramycin  Continue suppressive Azithromycin   Monitor labs  Will continue to follow     MIGUE Bruce CNP  10:22 AM  1/23/2024    Patient seen and examined. I had a face to face encounter with the patient. Agree with exam.  Assessment and plan as outlined above and directed by me. Addition and corrections were done as deemed appropriate. My exam and plan include: The patient is doing better.  Oxygen requirements are going down.  Tolerating oral and inhaled antibiotics.  Pseudomonas is pansensitive.  Continue current treatment.  Once his oxygen requirements go down to baseline he can be discharged from ID standpoint.  He is to continue to complete 2 weeks of treatment.  Tobramycin will not be reconciled since this is not covered by insurance as an outpatient.    Korey Duque MD  1/23/2024  2:17 PM

## 2024-01-23 NOTE — PROGRESS NOTES
[facility] Quality Flow/Interdisciplinary Rounds Progress Note        Quality Flow Rounds held on January 23, 2024    Disciplines Attending:  Bedside Nurse, , , and Nursing Unit Leadership    Jason Cox was admitted on 1/14/2024 11:39 PM    Anticipated Discharge Date:  Expected Discharge Date: 01/19/24    Disposition:    Sandro Score:  Sandro Scale Score: 22    Readmission Risk              Risk of Unplanned Readmission:  21           Discussed patient goal for the day, patient clinical progression, and barriers to discharge.  The following Goal(s) of the Day/Commitment(s) have been identified:   inhaled tobramycin, po levaquin/po zithro, iv solumedrol       Juliana Cano RN  January 23, 2024

## 2024-01-23 NOTE — PROGRESS NOTES
[facility] Hospitalist Progress Note    Admitting Date and Time: 1/14/2024 11:39 PM  Admit Dx: Acute hypoxic respiratory failure (HCC) [J96.01]  COPD exacerbation (HCC) [J44.1]    Subjective:  Patient is being followed for Acute hypoxic respiratory failure (HCC) [J96.01]  COPD exacerbation (HCC) [J44.1]   Pt feels well today  Per RN: no additional concerns    ROS: denies fever, chills, cp, sob, n/v, HA unless stated above.      [START ON 1/24/2024] predniSONE  30 mg Oral Daily    sodium chloride  1 spray Each Nostril TID    levoFLOXacin  750 mg Oral Daily    tobramycin (PF)  300 mg Nebulization BID RT    budesonide  1,000 mcg Nebulization BID RT    arformoterol tartrate  15 mcg Nebulization BID RT    sodium chloride flush  5-40 mL IntraVENous 2 times per day    enoxaparin  40 mg SubCUTAneous Daily    famotidine  20 mg Oral BID    azithromycin  500 mg Oral Once per day on Mon Wed Fri    bumetanide  1 mg Oral Once per day on Mon Wed Fri    ipratropium 0.5 mg-albuterol 2.5 mg  1 Dose Inhalation Q4H WA RT     guaiFENesin, 100 mg, Q6H PRN  sodium chloride flush, 5-40 mL, PRN  sodium chloride, , PRN  potassium chloride, 40 mEq, PRN   Or  potassium alternative oral replacement, 40 mEq, PRN   Or  potassium chloride, 10 mEq, PRN  magnesium sulfate, 2,000 mg, PRN  ondansetron, 4 mg, Q8H PRN   Or  ondansetron, 4 mg, Q6H PRN  polyethylene glycol, 17 g, Daily PRN  acetaminophen, 650 mg, Q6H PRN   Or  acetaminophen, 650 mg, Q6H PRN  oxyCODONE-acetaminophen, 1 tablet, BID PRN   And  oxyCODONE, 2.5 mg, BID PRN         Objective:  /66   Pulse (!) 114   Temp 97 °F (36.1 °C) (Oral)   Resp 20   Ht 1.854 m (6' 1\")   Wt 82.9 kg (182 lb 12.2 oz)   SpO2 92%   BMI 24.11 kg/m²     General Appearance: alert and oriented to person, place and time and in NAD, sitting in bed eating lunch  Skin: warm and dry  Head: normocephalic and atraumatic  Eyes: PERRL, EOMI, conjunctivae normal  Neck: neck supple, trachea midline

## 2024-01-23 NOTE — PROGRESS NOTES
Pulmonary Progress Note    Admit Date: 2024  Hospital day                               PCP: Vinny Issa MD    Chief Complaint (s):  Patient Active Problem List   Diagnosis    COPD (chronic obstructive pulmonary disease) (HCC)    Thoracic ascending aortic aneurysm    S/P lumbar fusion    Hypophosphatemia    Glucose intolerance    Hilar adenopathy    Pulmonary Mycobacterium avium complex (MAC) infection (HCC)    Chronic respiratory failure with hypoxia (HCC)    Chronic pain syndrome    Lumbar degenerative disc disease    Chronic low back pain    Immunocompromised (HCC)    Iron deficiency    Pulmonary hypertension (HCC)    Chronic diastolic heart failure (HCC)    COPD exacerbation (HCC)    Acute hypoxic respiratory failure (HCC)    Dehydration    Parainfluenza infection    Acute respiratory failure with hypoxia (HCC)       Subjective:  Patient seen on 6 L nasal cannula. He admits to desaturation with exertion. Patient does have light yellow sputum and feels some rattling in his chest on exhale.       Vitals:  VITALS:  /64   Pulse 90   Temp 97.6 °F (36.4 °C) (Oral)   Resp 19   Ht 1.854 m (6' 1\")   Wt 82.9 kg (182 lb 12.2 oz)   SpO2 92%   BMI 24.11 kg/m²     24HR INTAKE/OUTPUT:      Intake/Output Summary (Last 24 hours) at 2024 1059  Last data filed at 2024 0626  Gross per 24 hour   Intake --   Output 300 ml   Net -300 ml         24HR PULSE OXIMETRY RANGE:    SpO2  Av.3 %  Min: 90 %  Max: 92 %    Medications:  IV:   sodium chloride         Scheduled Meds:   sodium chloride  1 spray Each Nostril TID    levoFLOXacin  750 mg Oral Daily    tobramycin (PF)  300 mg Nebulization BID RT    predniSONE  40 mg Oral Daily    budesonide  1,000 mcg Nebulization BID RT    arformoterol tartrate  15 mcg Nebulization BID RT    sodium chloride flush  5-40 mL IntraVENous 2 times per day    enoxaparin  40 mg SubCUTAneous Daily    famotidine  20 mg Oral BID    azithromycin  500 mg Oral Once  chronic obstructive pulmonary disease.             Assessment:  Acute exacerbation of COPD secondary to parainfluenza 4  History of colonization with MAC.   History of colonization with Pseudomonas.   No evidence of alpha-1 antitrypsin deficiency, this has been tested multiple times      Plan:  Add chest vest  Wean steroids further   Pseudomonas treatment per ID  Continue aerosols  Continue supplemental oxygen and wean as tolerated. Goal SpO2 is 88%-92%.    Time at the bedside, reviewing labs and radiographs, reviewing updated notes and consultations, discussing with staff and family was more than 35 minutes.    Please note that voice recognition technology was used in the preparation of this note and make therefore it may contain inadvertent transcription errors.  If the patient is a COVID 19 isolation patient, the above physical exam reflects that of the examining physician for the day.      MIGUE Lazo - JOY  Attestation    An additional 15+ minutes was spent at the bedside, discussion with physician extender and chart/lab review.  Seen this p.m., will try using chest vest for sputum clearance.  If unsuccessful, consideration for bronchoscopy.    I have discussed the case, including pertinent history and exam findings with the nurse practitioner.  I have reviewed meds and pertinent labs and the key elements of the encounter have been performed by me.  I agree with the assessment, plan and orders as documented by the nurse practitioner.  Additions and corrections are reflected in the signed note.    Rolf Little MD,  M.D., F.C.C.P.   [facility]    [facility], [address] [place] [address], [address], [place] [address]  Office visits:  [address] [place] [address]

## 2024-01-24 PROCEDURE — 2580000003 HC RX 258: Performed by: STUDENT IN AN ORGANIZED HEALTH CARE EDUCATION/TRAINING PROGRAM

## 2024-01-24 PROCEDURE — 6370000000 HC RX 637 (ALT 250 FOR IP): Performed by: STUDENT IN AN ORGANIZED HEALTH CARE EDUCATION/TRAINING PROGRAM

## 2024-01-24 PROCEDURE — 6370000000 HC RX 637 (ALT 250 FOR IP)

## 2024-01-24 PROCEDURE — 6360000002 HC RX W HCPCS: Performed by: SPECIALIST

## 2024-01-24 PROCEDURE — 2060000000 HC ICU INTERMEDIATE R&B

## 2024-01-24 PROCEDURE — 6370000000 HC RX 637 (ALT 250 FOR IP): Performed by: SPECIALIST

## 2024-01-24 PROCEDURE — 2700000000 HC OXYGEN THERAPY PER DAY

## 2024-01-24 PROCEDURE — 94640 AIRWAY INHALATION TREATMENT: CPT

## 2024-01-24 PROCEDURE — 99232 SBSQ HOSP IP/OBS MODERATE 35: CPT | Performed by: STUDENT IN AN ORGANIZED HEALTH CARE EDUCATION/TRAINING PROGRAM

## 2024-01-24 PROCEDURE — 6360000002 HC RX W HCPCS

## 2024-01-24 PROCEDURE — 6360000002 HC RX W HCPCS: Performed by: STUDENT IN AN ORGANIZED HEALTH CARE EDUCATION/TRAINING PROGRAM

## 2024-01-24 PROCEDURE — 94669 MECHANICAL CHEST WALL OSCILL: CPT

## 2024-01-24 RX ADMIN — ARFORMOTEROL TARTRATE 15 MCG: 15 SOLUTION RESPIRATORY (INHALATION) at 20:18

## 2024-01-24 RX ADMIN — SALINE NASAL SPRAY 1 SPRAY: 1.5 SOLUTION NASAL at 07:28

## 2024-01-24 RX ADMIN — AZITHROMYCIN 500 MG: 250 TABLET, FILM COATED ORAL at 07:27

## 2024-01-24 RX ADMIN — ENOXAPARIN SODIUM 40 MG: 100 INJECTION SUBCUTANEOUS at 07:27

## 2024-01-24 RX ADMIN — TOBRAMYCIN 300 MG: 300 SOLUTION RESPIRATORY (INHALATION) at 10:02

## 2024-01-24 RX ADMIN — BUDESONIDE 1000 MCG: 0.5 SUSPENSION RESPIRATORY (INHALATION) at 09:31

## 2024-01-24 RX ADMIN — ARFORMOTEROL TARTRATE 15 MCG: 15 SOLUTION RESPIRATORY (INHALATION) at 09:31

## 2024-01-24 RX ADMIN — IPRATROPIUM BROMIDE AND ALBUTEROL SULFATE 1 DOSE: .5; 2.5 SOLUTION RESPIRATORY (INHALATION) at 16:38

## 2024-01-24 RX ADMIN — FAMOTIDINE 20 MG: 20 TABLET ORAL at 07:26

## 2024-01-24 RX ADMIN — SALINE NASAL SPRAY 1 SPRAY: 1.5 SOLUTION NASAL at 13:25

## 2024-01-24 RX ADMIN — BUDESONIDE 1000 MCG: 0.5 SUSPENSION RESPIRATORY (INHALATION) at 20:18

## 2024-01-24 RX ADMIN — IPRATROPIUM BROMIDE AND ALBUTEROL SULFATE 1 DOSE: .5; 2.5 SOLUTION RESPIRATORY (INHALATION) at 09:31

## 2024-01-24 RX ADMIN — PREDNISONE 30 MG: 20 TABLET ORAL at 07:26

## 2024-01-24 RX ADMIN — SODIUM CHLORIDE, PRESERVATIVE FREE 10 ML: 5 INJECTION INTRAVENOUS at 22:36

## 2024-01-24 RX ADMIN — SODIUM CHLORIDE, PRESERVATIVE FREE 10 ML: 5 INJECTION INTRAVENOUS at 07:27

## 2024-01-24 RX ADMIN — FAMOTIDINE 20 MG: 20 TABLET ORAL at 22:35

## 2024-01-24 RX ADMIN — LEVOFLOXACIN 750 MG: 500 TABLET, FILM COATED ORAL at 07:26

## 2024-01-24 RX ADMIN — SALINE NASAL SPRAY 1 SPRAY: 1.5 SOLUTION NASAL at 22:36

## 2024-01-24 RX ADMIN — IPRATROPIUM BROMIDE AND ALBUTEROL SULFATE 1 DOSE: .5; 2.5 SOLUTION RESPIRATORY (INHALATION) at 20:17

## 2024-01-24 RX ADMIN — BUMETANIDE 1 MG: 1 TABLET ORAL at 07:26

## 2024-01-24 RX ADMIN — IPRATROPIUM BROMIDE AND ALBUTEROL SULFATE 1 DOSE: .5; 2.5 SOLUTION RESPIRATORY (INHALATION) at 12:53

## 2024-01-24 ASSESSMENT — PAIN SCALES - GENERAL: PAINLEVEL_OUTOF10: 0

## 2024-01-24 NOTE — PROGRESS NOTES
Pulmonary Progress Note    Admit Date: 2024  Hospital day                               PCP: Vinny Issa MD    Chief Complaint (s):  Patient Active Problem List   Diagnosis    COPD (chronic obstructive pulmonary disease) (HCC)    Thoracic ascending aortic aneurysm    S/P lumbar fusion    Hypophosphatemia    Glucose intolerance    Hilar adenopathy    Pulmonary Mycobacterium avium complex (MAC) infection (HCC)    Chronic respiratory failure with hypoxia (HCC)    Chronic pain syndrome    Lumbar degenerative disc disease    Chronic low back pain    Immunocompromised (HCC)    Iron deficiency    Pulmonary hypertension (HCC)    Chronic diastolic heart failure (HCC)    COPD exacerbation (HCC)    Acute hypoxic respiratory failure (HCC)    Dehydration    Parainfluenza infection    Acute respiratory failure with hypoxia (HCC)       Subjective:  Patient seen on 5 L receiving breathing treatment. Overall, he feels further improved. His secretions are clear in color. He feels the chest vest helped this morning.       Vitals:  VITALS:  /80   Pulse 76   Temp 97.7 °F (36.5 °C) (Oral)   Resp 20   Ht 1.854 m (6' 1\")   Wt 82.9 kg (182 lb 12.2 oz)   SpO2 90%   BMI 24.11 kg/m²     24HR INTAKE/OUTPUT:    No intake or output data in the 24 hours ending 24 1039      24HR PULSE OXIMETRY RANGE:    SpO2  Av.7 %  Min: 90 %  Max: 95 %    Medications:  IV:   sodium chloride         Scheduled Meds:   predniSONE  30 mg Oral Daily    sodium chloride  1 spray Each Nostril TID    levoFLOXacin  750 mg Oral Daily    budesonide  1,000 mcg Nebulization BID RT    arformoterol tartrate  15 mcg Nebulization BID RT    sodium chloride flush  5-40 mL IntraVENous 2 times per day    enoxaparin  40 mg SubCUTAneous Daily    famotidine  20 mg Oral BID    azithromycin  500 mg Oral Once per day on     bumetanide  1 mg Oral Once per day on     ipratropium 0.5 mg-albuterol 2.5 mg  1 Dose Inhalation Q4H

## 2024-01-24 NOTE — PROGRESS NOTES
Pox was noted to be 82% on cardiac monitor. Pt was up finishing up in the bathroom. Pt recovered to 88% on 5L after resting for 10 minutes.

## 2024-01-24 NOTE — PROGRESS NOTES
[facility] Quality Flow/Interdisciplinary Rounds Progress Note        Quality Flow Rounds held on January 24, 2024    Disciplines Attending:  Bedside Nurse, , , and Nursing Unit Leadership    Jason Cox was admitted on 1/14/2024 11:39 PM    Anticipated Discharge Date:  Expected Discharge Date: 01/19/24    Disposition:    Sandro Score:  Sandro Scale Score: 23    Readmission Risk              Risk of Unplanned Readmission:  22           Discussed patient goal for the day, patient clinical progression, and barriers to discharge.  The following Goal(s) of the Day/Commitment(s) have been identified:   weanoxygen as able, inhaled abx, po abx and steroids.       Juliana Cano RN  January 24, 2024

## 2024-01-24 NOTE — PROGRESS NOTES
Pox noted on telemetry monitor to be 79% Pt is sitting on side of bed upon entering room with O2 at 5 L. Does not appear to be in any acute distress. States he was up walking, about to use the bathroom and dietary came by and he walked his dinner tray to them as he is in isolation. 's denies chest pain, denies dizziness. Pt recovered after several minutes with pox of 83% and HR of 126. States he still needs to go to the bathroom. Increased O2 to 7 L and he recovered after several minutes to 91%. Assisted pt to bathroom for safety.

## 2024-01-24 NOTE — PROGRESS NOTES
[facility]  [facility]  Progress Note    SUBJECTIVE:  No chief complaint on file.    Patient is sitting up in bed  RT in room to give eugenio aerosol   Says he is doing good - had chest vest this AM - lungs sound better today   No fevers   Down to 5LNC     Review of systems:  As stated above in the chief complaint, otherwise negative.    Medications:  Scheduled Meds:   predniSONE  30 mg Oral Daily    sodium chloride  1 spray Each Nostril TID    levoFLOXacin  750 mg Oral Daily    budesonide  1,000 mcg Nebulization BID RT    arformoterol tartrate  15 mcg Nebulization BID RT    sodium chloride flush  5-40 mL IntraVENous 2 times per day    enoxaparin  40 mg SubCUTAneous Daily    famotidine  20 mg Oral BID    azithromycin  500 mg Oral Once per day on     bumetanide  1 mg Oral Once per day on     ipratropium 0.5 mg-albuterol 2.5 mg  1 Dose Inhalation Q4H WA RT     Continuous Infusions:   sodium chloride       PRN Meds:guaiFENesin, sodium chloride flush, sodium chloride, potassium chloride **OR** potassium alternative oral replacement **OR** potassium chloride, magnesium sulfate, ondansetron **OR** ondansetron, polyethylene glycol, acetaminophen **OR** acetaminophen, oxyCODONE-acetaminophen **AND** oxyCODONE    OBJECTIVE:  /80   Pulse 76   Temp 97.7 °F (36.5 °C) (Oral)   Resp 20   Ht 1.854 m (6' 1\")   Wt 82.9 kg (182 lb 12.2 oz)   SpO2 90%   BMI 24.11 kg/m²   Temp  Av.5 °F (36.4 °C)  Min: 97 °F (36.1 °C)  Max: 97.8 °F (36.6 °C)  Constitutional: The patient is awake, alert, and oriented. Sitting up in bed, in no distress.  Skin: Warm and dry. No rashes were noted.   HEENT: Round and reactive pupils.  Moist mucous membranes.  No ulcerations or thrush.  Neck: Supple to movements.   Chest: No use of accessory muscles to breathe. Symmetrical expansion. Some bibasilar crackles noted. No wheezing   Cardiovascular: S1 and S2 are rhythmic and regular. No murmurs

## 2024-01-24 NOTE — PROGRESS NOTES
[facility] Hospitalist Progress Note    Admitting Date and Time: 1/14/2024 11:39 PM  Admit Dx: Acute hypoxic respiratory failure (HCC) [J96.01]  COPD exacerbation (HCC) [J44.1]    Subjective:  Patient is being followed for Acute hypoxic respiratory failure (HCC) [J96.01]  COPD exacerbation (HCC) [J44.1]   Pt feels well today  Per RN: no additional concerns    ROS: denies fever, chills, cp, sob, n/v, HA unless stated above.      predniSONE  30 mg Oral Daily    sodium chloride  1 spray Each Nostril TID    levoFLOXacin  750 mg Oral Daily    budesonide  1,000 mcg Nebulization BID RT    arformoterol tartrate  15 mcg Nebulization BID RT    sodium chloride flush  5-40 mL IntraVENous 2 times per day    enoxaparin  40 mg SubCUTAneous Daily    famotidine  20 mg Oral BID    azithromycin  500 mg Oral Once per day on Mon Wed Fri    bumetanide  1 mg Oral Once per day on Mon Wed Fri    ipratropium 0.5 mg-albuterol 2.5 mg  1 Dose Inhalation Q4H WA RT     guaiFENesin, 100 mg, Q6H PRN  sodium chloride flush, 5-40 mL, PRN  sodium chloride, , PRN  potassium chloride, 40 mEq, PRN   Or  potassium alternative oral replacement, 40 mEq, PRN   Or  potassium chloride, 10 mEq, PRN  magnesium sulfate, 2,000 mg, PRN  ondansetron, 4 mg, Q8H PRN   Or  ondansetron, 4 mg, Q6H PRN  polyethylene glycol, 17 g, Daily PRN  acetaminophen, 650 mg, Q6H PRN   Or  acetaminophen, 650 mg, Q6H PRN  oxyCODONE-acetaminophen, 1 tablet, BID PRN   And  oxyCODONE, 2.5 mg, BID PRN         Objective:  /72   Pulse 96   Temp 98.4 °F (36.9 °C) (Oral)   Resp 20   Ht 1.854 m (6' 1\")   Wt 82.9 kg (182 lb 12.2 oz)   SpO2 92%   BMI 24.11 kg/m²     General Appearance: alert and oriented to person, place and time and in NAD, sitting in bed eating lunch  Skin: warm and dry  Head: normocephalic and atraumatic  Eyes: PERRL, EOMI, conjunctivae normal  Neck: neck supple, trachea midline   Pulmonary/Chest: CTAB, no w/r/r, normal air movement, no respiratory  distress, 4L NC  Cardiovascular: RRR, no murmurs  Abdomen: soft, non-tender, non-distended, normal bowel sounds, no masses or organomegaly  Extremities: no cyanosis, no clubbing and no edema  Neurologic: no cranial nerve deficit and speech normal      Recent Labs     01/22/24  0455      K 4.0      CO2 25   BUN 14   CREATININE 0.6*   GLUCOSE 92   CALCIUM 9.7         Recent Labs     01/22/24  0455   WBC 8.1   RBC 5.91*   HGB 17.6*   HCT 52.9   MCV 89.5   MCH 29.8   MCHC 33.3   RDW 14.1      MPV 10.0         Radiology:  XR CHEST PORTABLE   Final Result   1.  Minimal bibasilar parenchymal densities concerning for atelectasis.      2.  Advanced underlying signs of chronic obstructive pulmonary disease.              Assessment:  Principal Problem:    Acute hypoxic respiratory failure (HCC)  Active Problems:    COPD exacerbation (HCC)    Dehydration    Parainfluenza infection    Acute respiratory failure with hypoxia (HCC)  Resolved Problems:    * No resolved hospital problems. *      Plan:  Acute respiratory failure with hypoxia- ([id number]%)POA, wean o2 as able. Pulm following  Parainfluenza infection- supportive care and tx underlying infection  Bronchitis- Pseudomonas on resp Cx, ID c/s, start levaquin and inhaled tobramycin  Copd exac- nebs and wean steroids as able  Dehydration- monitor off iv fluids  Colonization of MAC- continue suppressive azithro    Today's exam/findings/plan  -cont levaquin 14d course at home, now off inhaled tobramycin, appears to be back to his b/l O2 reqs, appreciate Pulm and ID c/s  -down to 4-5L NC while at rest, pt reports having 10L concentrator at home and 5L inogen machine, may need higher O2 for dispo  -cont to wean steroids while inpt, will complete as outpt, appreciate Pulm  -nursing notes reviewed, awaiting clarification on home chest vest requirement from Pulm, anticipate dispo home pending receipt of necessary DME    NOTE: Portions of this report may have been

## 2024-01-24 NOTE — PROGRESS NOTES
Dr. Christine states he reviewed notes and didn't see one today from Dr. Little yet and was wondering about dc from pulmonary standpoint. Notified Dr. Christine that pt states he did see him this afternoon and discussed with pt obtaining a chest vest for home use. Notified Dr. Christine that pt is currently on 5 L NC at this time but when he ambulates to the bathroom he drops to low 80's. Dr. Christine asked that this be clarified regarding chest vest if it is something that pt has to be dc to have ordered or can this be arranged while still inpatient. Dr. Little paged for questions regarding dc from there standpoint and chest vest question.

## 2024-01-25 VITALS
BODY MASS INDEX: 24.25 KG/M2 | SYSTOLIC BLOOD PRESSURE: 123 MMHG | HEIGHT: 73 IN | DIASTOLIC BLOOD PRESSURE: 68 MMHG | WEIGHT: 182.98 LBS | TEMPERATURE: 98.3 F | OXYGEN SATURATION: 90 % | RESPIRATION RATE: 18 BRPM | HEART RATE: 91 BPM

## 2024-01-25 PROCEDURE — 6370000000 HC RX 637 (ALT 250 FOR IP): Performed by: STUDENT IN AN ORGANIZED HEALTH CARE EDUCATION/TRAINING PROGRAM

## 2024-01-25 PROCEDURE — 94640 AIRWAY INHALATION TREATMENT: CPT

## 2024-01-25 PROCEDURE — 2580000003 HC RX 258: Performed by: STUDENT IN AN ORGANIZED HEALTH CARE EDUCATION/TRAINING PROGRAM

## 2024-01-25 PROCEDURE — 6370000000 HC RX 637 (ALT 250 FOR IP): Performed by: INTERNAL MEDICINE

## 2024-01-25 PROCEDURE — 6360000002 HC RX W HCPCS

## 2024-01-25 PROCEDURE — 99239 HOSP IP/OBS DSCHRG MGMT >30: CPT | Performed by: INTERNAL MEDICINE

## 2024-01-25 PROCEDURE — 2700000000 HC OXYGEN THERAPY PER DAY

## 2024-01-25 PROCEDURE — 6370000000 HC RX 637 (ALT 250 FOR IP): Performed by: REGISTERED NURSE

## 2024-01-25 PROCEDURE — 94669 MECHANICAL CHEST WALL OSCILL: CPT

## 2024-01-25 PROCEDURE — 6370000000 HC RX 637 (ALT 250 FOR IP)

## 2024-01-25 RX ORDER — LEVOFLOXACIN 750 MG/1
750 TABLET, FILM COATED ORAL DAILY
Qty: 7 TABLET | Refills: 0 | Status: SHIPPED | OUTPATIENT
Start: 2024-01-26 | End: 2024-02-02

## 2024-01-25 RX ADMIN — FAMOTIDINE 20 MG: 20 TABLET ORAL at 08:01

## 2024-01-25 RX ADMIN — IPRATROPIUM BROMIDE AND ALBUTEROL SULFATE 1 DOSE: .5; 2.5 SOLUTION RESPIRATORY (INHALATION) at 12:44

## 2024-01-25 RX ADMIN — SODIUM CHLORIDE, PRESERVATIVE FREE 10 ML: 5 INJECTION INTRAVENOUS at 08:03

## 2024-01-25 RX ADMIN — LEVOFLOXACIN 750 MG: 500 TABLET, FILM COATED ORAL at 08:01

## 2024-01-25 RX ADMIN — ARFORMOTEROL TARTRATE 15 MCG: 15 SOLUTION RESPIRATORY (INHALATION) at 09:06

## 2024-01-25 RX ADMIN — PREDNISONE 30 MG: 20 TABLET ORAL at 08:01

## 2024-01-25 RX ADMIN — SALINE NASAL SPRAY 1 SPRAY: 1.5 SOLUTION NASAL at 13:18

## 2024-01-25 RX ADMIN — IPRATROPIUM BROMIDE AND ALBUTEROL SULFATE 1 DOSE: .5; 2.5 SOLUTION RESPIRATORY (INHALATION) at 09:06

## 2024-01-25 RX ADMIN — SALINE NASAL SPRAY 1 SPRAY: 1.5 SOLUTION NASAL at 08:01

## 2024-01-25 RX ADMIN — BUDESONIDE 1000 MCG: 0.5 SUSPENSION RESPIRATORY (INHALATION) at 09:06

## 2024-01-25 ASSESSMENT — PAIN SCALES - GENERAL
PAINLEVEL_OUTOF10: 0
PAINLEVEL_OUTOF10: 0

## 2024-01-25 NOTE — PROGRESS NOTES
Wed Fri    bumetanide  1 mg Oral Once per day on Mon Wed Fri    ipratropium 0.5 mg-albuterol 2.5 mg  1 Dose Inhalation Q4H WA RT       Diet:   ADULT DIET; Regular  ADULT ORAL NUTRITION SUPPLEMENT; Breakfast; Standard High Calorie/High Protein Oral Supplement         EXAM:  General: No distress. Alert.  Eyes: PERRL. No sclera icterus. No conjunctival injection.  ENT: No discharge. Pharynx clear.  Neck: Trachea midline. Normal thyroid.  Resp: No accessory muscle use. no rales. no wheezing. no rhonchi. Diminished throughout.   CV: Regular rate. Regular rhythm. No murmur or rub.   Abd: Non-tender. Non-distended. No masses. No organomegaly. Normal bowel sounds.   Skin: Warm and dry. No nodule on exposed extremities. No rash on exposed extremities.  Ext: No cyanosis, clubbing, edema  Lymph: No cervical LAD. No supraclavicular LAD.   M/S: No cyanosis. No joint deformity. No clubbing.   Neuro: Awake. Follows commands. Positive pupils/gag/corneals. Normal pain response.         Results:  CBC:   No results for input(s): \"WBC\", \"HGB\", \"HCT\", \"MCV\", \"PLT\" in the last 72 hours.    BMP:   No results for input(s): \"NA\", \"K\", \"CL\", \"CO2\", \"PHOS\", \"BUN\", \"CREATININE\", \"CA\" in the last 72 hours.    LIVER PROFILE:   No results for input(s): \"AST\", \"ALT\", \"LIPASE\", \"AMYLASE\", \"ALB\", \"BILIDIR\", \"BILITOT\", \"ALKPHOS\" in the last 72 hours.    PT/INR: No results for input(s): \"PROTIME\", \"INR\" in the last 72 hours.  APTT: No results for input(s): \"APTT\" in the last 72 hours.      Pathology:  N/A      Microbiology:  None    Recent ABG:   No results for input(s): \"PH\", \"PO2\", \"PCO2\", \"HCO3\", \"BE\", \"O2SAT\", \"METHB\", \"O2HB\", \"COHB\", \"O2CON\", \"HHB\", \"THB\" in the last 72 hours.          Recent Films:  XR CHEST PORTABLE   Final Result   1.  Minimal bibasilar parenchymal densities concerning for atelectasis.      2.  Advanced underlying signs of chronic obstructive pulmonary disease.         CT Chest:    Lungs/pleura: Chronic obstructive pulmonary  disease with severe upper lobe  predominant centrilobular emphysema.  Central bronchiectasis without  significant wall thickening or mucous plugging.  No consolidation.  Mild  septal thickening in the mid and lower lungs of likely trace interstitial  edema pattern.  Asymmetric elevation the right hemidiaphragm without  significant pleural effusion.    Assessment:  Acute exacerbation of COPD secondary to parainfluenza 4  History of colonization with MAC  History of colonization with Pseudomonas  No evidence of alpha-1 antitrypsin deficiency, this has been tested multiple times.    Bronchiectasis with mucous: Patient requires chest vest to clear secretions and avoid readmissions.     Plan:  Pseudomonas treatment per ID  Prednisone taper  Continue aerosols  Continue supplemental oxygen and wean as tolerated. Goal SpO2 is 88%-92%.   DME for chest vest to treat bronchiectasis.     Time at the bedside, reviewing labs and radiographs, reviewing updated notes and consultations, discussing with staff and family was more than 35 minutes.    Please note that voice recognition technology was used in the preparation of this note and make therefore it may contain inadvertent transcription errors.  If the patient is a COVID 19 isolation patient, the above physical exam reflects that of the examining physician for the day.      MIGUE Lazo - NP     [facility]    [facility], [address] [place] [address], [address], [place] [address]  Office visits:  [address]", [place] [address]

## 2024-01-25 NOTE — CARE COORDINATION
Transition of care update: Copd exac w/parainfluenza, steroids, levaquin and azithromax po. Chest vest noted for outpt per pulm, no order in place. Pt remains independent in the room. Pt has both a cane and walker if needed. O2 currently at 5l. [facility] supplies his oxygen. He has an inogen, concentrator that can accommodate 10l and a nebulizer. HCS notified of the need for a portable tank delivered to hospital upon discharge if pt requires more than his inogen can accommodate, [phone]. [facility] requesting new testing and orders. Will follow pending further treatment plans.   ERICK ColonN, RN  [facility] Case Management  [phone]

## 2024-01-25 NOTE — PROGRESS NOTES
Discharge instructions given to patient regarding follow-up appointments, new medications to pick-up at patient's preferred pharmacy [facility], and his current medications. Portable oxygen tank delivered.

## 2024-01-25 NOTE — PLAN OF CARE
Problem: Pain  Goal: Verbalizes/displays adequate comfort level or baseline comfort level  1/25/2024 0933 by Akosua Whitaker RN  Outcome: Progressing  1/25/2024 0532 by Rashaad Sepulveda RN  Outcome: Progressing  1/25/2024 0329 by Kristin Richmond RN  Outcome: Progressing     Problem: Safety - Adult  Goal: Free from fall injury  1/25/2024 0933 by Akosua Whitaker RN  Outcome: Progressing  1/25/2024 0532 by Rashaad Sepulveda RN  Outcome: Progressing  1/25/2024 0329 by Kristin Richmond RN  Outcome: Progressing     Problem: Chronic Conditions and Co-morbidities  Goal: Patient's chronic conditions and co-morbidity symptoms are monitored and maintained or improved  1/25/2024 0933 by Akosua Whitaker RN  Outcome: Progressing  1/25/2024 0329 by Kristin Richmond RN  Outcome: Progressing

## 2024-01-25 NOTE — CARE COORDINATION
Chest vest script signed by Dr. Little. All orders and documents faxed to [facility], [phone]. Pt does not need to wait for approval of chest vest before discharging per Dr. Little, Sobrr to follow outpt.   ERICK ColonN, RN  [facility] Case Management  [phone]

## 2024-01-25 NOTE — PROGRESS NOTES
Ambulating pulse ox completed.    Pulse ox 86% sitting at room air  Pulse ox 80% ambulating at room air  Pulse ox quickly drops to 70% then recovers to 89% with 5 liters of oxygen reapplied.     Patient is resting without distress at this time.

## 2024-01-25 NOTE — PLAN OF CARE
Problem: Pain  Goal: Verbalizes/displays adequate comfort level or baseline comfort level  1/25/2024 0532 by Rashaad Sepulveda, RN  Outcome: Progressing     Problem: Safety - Adult  Goal: Free from fall injury  1/25/2024 0532 by Rashaad Sepulveda, RN  Outcome: Progressing

## 2024-01-25 NOTE — PROGRESS NOTES
[facility] Quality Flow/Interdisciplinary Rounds Progress Note        Quality Flow Rounds held on January 25, 2024    Disciplines Attending:  Bedside Nurse, , , and Nursing Unit Leadership    Jason Cox was admitted on 1/14/2024 11:39 PM    Anticipated Discharge Date:  Expected Discharge Date: 01/25/24    Disposition:    Sandro Score:  Sandro Scale Score: 20    Readmission Risk              Risk of Unplanned Readmission:  22           Discussed patient goal for the day, patient clinical progression, and barriers to discharge.  The following Goal(s) of the Day/Commitment(s) have been identified:   discharge once oxygen set up      Juliana Cano RN  January 25, 2024

## 2024-01-25 NOTE — CARE COORDINATION
Chest vest order placed w/HCS notified. Fifi w/HCS states she needs a paper script filled out and is faxing to Dr. Little's office and to S for completion. A portable oxygen tank will be delivered today to pt.'s room.   Lorraine Hernandez, ERICKN, RN  [facility] Case Management  [phone]

## 2024-01-25 NOTE — DISCHARGE SUMMARY
[facility] Hospitalist Physician Discharge Summary       Korey Duque MD  [address]  [address]  [place] [address]  [phone]    Follow up in 6 month(s)      Vinny Issa MD  [address] [address]  [phone]    Call  call within 1 week of discharge      Activity level: As tolerated     Dispo: Home      Condition on discharge: Stable     Patient ID:  Jason Cox  [id number]  73 y.o.  1950    Admit date: 1/14/2024    Discharge date and time:  1/25/2024  2:47 PM    Admission Diagnoses: Principal Problem:    Acute hypoxic respiratory failure (HCC)  Active Problems:    COPD exacerbation (HCC)    Dehydration    Parainfluenza infection    Acute respiratory failure with hypoxia (HCC)  Resolved Problems:    * No resolved hospital problems. *      Discharge Diagnoses: Principal Problem:    Acute hypoxic respiratory failure (HCC)  Active Problems:    COPD exacerbation (HCC)    Dehydration    Parainfluenza infection    Acute respiratory failure with hypoxia (HCC)  Resolved Problems:    * No resolved hospital problems. *      Consults:  IP CONSULT TO PULMONOLOGY  IP CONSULT TO INFECTIOUS DISEASES    Procedures: None    Hospital Course:   Patient Jason Cox is a 73 y.o. presented with Acute hypoxic respiratory failure (HCC) [J96.01]  COPD exacerbation (HCC) [J44.1]    73 year old male presents with shortness of breath. He was found to have bronchitis secondary to pseudomonas as well as parainfluenza 4 virus. Patient was seen by ID. He was treated with levofloxacin and will complete a 14 day course. He was also treated with inhaled tobramycin and suppressive azithromycin. Patient is medically stable for discharge.     Discharge Exam:    General Appearance: alert and oriented to person, place and time and in no acute distress  Skin: warm and dry  Head: normocephalic and atraumatic  Eyes: pupils equal, round, extraocular eye movements intact, conjunctivae  axis dimension.  The heart and pericardium demonstrate no acute abnormality.  There is no acute abnormality of the thoracic aorta. Lungs/pleura: Chronic obstructive pulmonary disease with severe upper lobe predominant centrilobular emphysema.  Central bronchiectasis without significant wall thickening or mucous plugging.  No consolidation.  Mild septal thickening in the mid and lower lungs of likely trace interstitial edema pattern.  Asymmetric elevation the right hemidiaphragm without significant pleural effusion. Upper Abdomen: Limited images of the upper abdomen are unremarkable. Soft Tissues/Bones: No acute bone or soft tissue abnormality.     1. No acute pulmonary artery embolism.  Continued prominence of the central pulmonary arterial tree and pruning consistent of a pulmonary arterial hypertension configuration. 2. Severe emphysema with central bronchiectasis. 3. Trace interstitial edema pattern. 4. No pleural effusion.  Asymmetric elevation the right hemidiaphragm corresponds to findings on chest x-ray with a prominent pericardial fat pad and prominence of the right diaphragm. RECOMMENDATIONS: Continued annual low-dose lung screening given high risk findings of COPD     XR CHEST PORTABLE    Result Date: 1/14/2024  EXAMINATION: ONE XRAY VIEW OF THE CHEST 1/14/2024 12:56 pm COMPARISON: None. HISTORY: ORDERING SYSTEM PROVIDED HISTORY: hypoxia TECHNOLOGIST PROVIDED HISTORY: Reason for exam:->hypoxia FINDINGS: Cardiac size within normal limits.  Mild central congestion with bibasilar interstitial lung markings prominence of likely interstitial edema pattern with small to moderate right pleural effusion.  No pneumothorax     Findings suggestive of mild interstitial edema pattern with small to moderate right pleural effusion.       Patient Instructions:      Medication List        START taking these medications      ipratropium 0.5 mg-albuterol 2.5 mg 0.5-2.5 (3) MG/3ML Soln nebulizer solution  Commonly known as:

## 2024-01-25 NOTE — PROGRESS NOTES
Updated patient's personal pharmacy regarding the use of [facility] and also contacted [facility] outpatient who are changing patient's medications to go to [facility] per patient's request.

## 2024-01-25 NOTE — PROGRESS NOTES
[facility]  [facility]  Progress Note    SUBJECTIVE:  No chief complaint on file.    Patient is sitting up in bed  Cough with clear to white mucus  Says he is doing good -   No fevers   on 5LNC     Review of systems:  As stated above in the chief complaint, otherwise negative.    Medications:  Scheduled Meds:   predniSONE  30 mg Oral Daily    sodium chloride  1 spray Each Nostril TID    levoFLOXacin  750 mg Oral Daily    budesonide  1,000 mcg Nebulization BID RT    arformoterol tartrate  15 mcg Nebulization BID RT    sodium chloride flush  5-40 mL IntraVENous 2 times per day    enoxaparin  40 mg SubCUTAneous Daily    famotidine  20 mg Oral BID    azithromycin  500 mg Oral Once per day on     bumetanide  1 mg Oral Once per day on     ipratropium 0.5 mg-albuterol 2.5 mg  1 Dose Inhalation Q4H WA RT     Continuous Infusions:   sodium chloride       PRN Meds:guaiFENesin, sodium chloride flush, sodium chloride, potassium chloride **OR** potassium alternative oral replacement **OR** potassium chloride, magnesium sulfate, ondansetron **OR** ondansetron, polyethylene glycol, acetaminophen **OR** acetaminophen, oxyCODONE-acetaminophen **AND** oxyCODONE    OBJECTIVE:  /68   Pulse 91   Temp 98.3 °F (36.8 °C) (Oral)   Resp 18   Ht 1.854 m (6' 1\")   Wt 83 kg (182 lb 15.7 oz)   SpO2 90%   BMI 24.14 kg/m²   Temp  Av.2 °F (36.8 °C)  Min: 97.7 °F (36.5 °C)  Max: 98.7 °F (37.1 °C)  Constitutional: The patient is awake, alert, and oriented. Sitting up in bed, in no distress.  Skin: Warm and dry. No rashes were noted.   HEENT: Round and reactive pupils.  Moist mucous membranes.  No ulcerations or thrush.  Neck: Supple to movements.   Chest: No use of accessory muscles to breathe. Symmetrical expansion. Some bibasilar crackles noted. No wheezing   Cardiovascular: S1 and S2 are rhythmic and regular. No murmurs appreciated.   Abdomen: Positive bowel sounds to

## 2024-01-26 ENCOUNTER — CARE COORDINATION (OUTPATIENT)
Dept: CARE COORDINATION | Age: 74
End: 2024-01-26

## 2024-01-26 NOTE — CARE COORDINATION
Care Transitions Initial Follow Up Call    Call within 2 business days of discharge: Yes    Patient Current Location:  Home: [address] [place] [address]    -Phone picked up by patient after multiple rings for initial care transition call post hospital discharge.  Patient sounding frustrated-stated \"hold on\" and then used the F word.  Patient returned to phone and CTN inquired to patient as he sounded disgusted.  Patient stated \"I am, I am right in the middle of a breathing treatment and had to come get the phone.\"  CTN inquired to patient if he would like to return call to CTN at a more convenient time.  Patient replied  \"What do you need?\"   CTN explained role and call was to check on him post hospital discharge.  Patient stated \"I'm fine\" and then hung up on CTN.  -CTN will route to PCP clinical office pool under high priority need for TCM/hospital follow up appointment.   -CTN to sign off.             Patient: Jason Cox Patient : 1950   MRN: [id number]  Reason for Admission: acute hypoxic respiratory failure  Discharge Date: 24 RARS: Readmission Risk Score: 13.7      Last Discharge Facility       Date Complaint Diagnosis Description Type Department Provider    24   Admission (Discharged) Lisa Garza DO    24 Shortness of Breath; Tachycardia; Fever; Cough Acute respiratory failure with hypoxia (HCC) ED (TRANSFER) [facility] Hiram Whitfield MD; Ayaan Rivas...              Follow Up  Future Appointments   Date Time Provider Department Center   2024  2:00 PM Chirag Dawson DO [facility]   2024  9:30 AM Korey Duque MD [facility]   4/10/2024  1:15 PM Dea Cason, APRN - NP [facility]         Eula Andre RN

## 2024-02-14 PROBLEM — E86.0 DEHYDRATION: Status: RESOLVED | Noted: 2024-01-15 | Resolved: 2024-02-14

## 2024-03-01 ENCOUNTER — HOSPITAL ENCOUNTER (OUTPATIENT)
Age: 74
Discharge: HOME | End: 2024-03-01
Payer: MEDICARE

## 2024-03-01 DIAGNOSIS — J40 BRONCHITIS: ICD-10-CM

## 2024-03-01 DIAGNOSIS — A31.0 PULMONARY MYCOBACTERIUM AVIUM COMPLEX (MAC) INFECTION (HCC): ICD-10-CM

## 2024-03-01 LAB
ALBUMIN SERPL-MCNC: 4.5 G/DL (ref 3.5–5.2)
ALP SERPL-CCNC: 80 U/L (ref 40–129)
ALT SERPL-CCNC: 12 U/L (ref 0–40)
ANION GAP SERPL CALCULATED.3IONS-SCNC: 12 MMOL/L (ref 7–16)
AST SERPL-CCNC: 20 U/L (ref 0–39)
BASOPHILS # BLD: 0.04 K/UL (ref 0–0.2)
BASOPHILS NFR BLD: 1 % (ref 0–2)
BILIRUB SERPL-MCNC: 1 MG/DL (ref 0–1.2)
BUN SERPL-MCNC: 10 MG/DL (ref 6–23)
CALCIUM SERPL-MCNC: 10.7 MG/DL (ref 8.6–10.2)
CHLORIDE SERPL-SCNC: 100 MMOL/L (ref 98–107)
CO2 SERPL-SCNC: 24 MMOL/L (ref 22–29)
CREAT SERPL-MCNC: 0.8 MG/DL (ref 0.7–1.2)
CRP SERPL HS-MCNC: 3 MG/L (ref 0–5)
EOSINOPHIL # BLD: 0.04 K/UL (ref 0.05–0.5)
EOSINOPHILS RELATIVE PERCENT: 1 % (ref 0–6)
ERYTHROCYTE [DISTWIDTH] IN BLOOD BY AUTOMATED COUNT: 16.5 % (ref 11.5–15)
GFR SERPL CREATININE-BSD FRML MDRD: >60 ML/MIN/1.73M2
GLUCOSE SERPL-MCNC: 90 MG/DL (ref 74–99)
HCT VFR BLD AUTO: 50.6 % (ref 37–54)
HGB BLD-MCNC: 16.6 G/DL (ref 12.5–16.5)
IMM GRANULOCYTES # BLD AUTO: <0.03 K/UL (ref 0–0.58)
IMM GRANULOCYTES NFR BLD: 0 % (ref 0–5)
LYMPHOCYTES NFR BLD: 1.53 K/UL (ref 1.5–4)
LYMPHOCYTES RELATIVE PERCENT: 19 % (ref 20–42)
MCH RBC QN AUTO: 30.5 PG (ref 26–35)
MCHC RBC AUTO-ENTMCNC: 32.8 G/DL (ref 32–34.5)
MCV RBC AUTO: 92.8 FL (ref 80–99.9)
MONOCYTES NFR BLD: 0.51 K/UL (ref 0.1–0.95)
MONOCYTES NFR BLD: 6 % (ref 2–12)
NEUTROPHILS NFR BLD: 73 % (ref 43–80)
NEUTS SEG NFR BLD: 5.77 K/UL (ref 1.8–7.3)
PLATELET # BLD AUTO: 250 K/UL (ref 130–450)
PMV BLD AUTO: 10.1 FL (ref 7–12)
POTASSIUM SERPL-SCNC: 5.1 MMOL/L (ref 3.5–5)
PROT SERPL-MCNC: 7.4 G/DL (ref 6.4–8.3)
RBC # BLD AUTO: 5.45 M/UL (ref 3.8–5.8)
SODIUM SERPL-SCNC: 136 MMOL/L (ref 132–146)
WBC OTHER # BLD: 7.9 K/UL (ref 4.5–11.5)

## 2024-03-01 PROCEDURE — 36415 COLL VENOUS BLD VENIPUNCTURE: CPT

## 2024-03-01 PROCEDURE — 80053 COMPREHEN METABOLIC PANEL: CPT

## 2024-03-01 PROCEDURE — 85025 COMPLETE CBC W/AUTO DIFF WBC: CPT

## 2024-03-01 PROCEDURE — 86140 C-REACTIVE PROTEIN: CPT

## 2024-03-05 PROBLEM — J96.01 ACUTE HYPOXIC RESPIRATORY FAILURE (HCC): Status: RESOLVED | Noted: 2024-01-14 | Resolved: 2024-03-05

## 2024-03-05 PROBLEM — E83.39 HYPOPHOSPHATEMIA: Status: RESOLVED | Noted: 2019-06-10 | Resolved: 2024-03-05

## 2024-03-05 PROBLEM — J96.01 ACUTE RESPIRATORY FAILURE WITH HYPOXIA (HCC): Status: RESOLVED | Noted: 2024-01-15 | Resolved: 2024-03-05

## 2024-03-05 PROBLEM — J44.1 COPD EXACERBATION (HCC): Status: RESOLVED | Noted: 2024-01-14 | Resolved: 2024-03-05

## 2024-03-05 PROBLEM — B34.8 PARAINFLUENZA INFECTION: Status: RESOLVED | Noted: 2024-01-15 | Resolved: 2024-03-05

## 2024-03-05 PROBLEM — E74.39 GLUCOSE INTOLERANCE: Status: RESOLVED | Noted: 2019-06-10 | Resolved: 2024-03-05

## 2024-03-05 PROBLEM — D80.3 IGG DEFICIENCY (HCC): Status: ACTIVE | Noted: 2024-03-05

## 2024-03-28 ENCOUNTER — APPOINTMENT (OUTPATIENT)
Dept: CT IMAGING | Age: 74
DRG: 193 | End: 2024-03-28
Payer: MEDICARE

## 2024-03-28 ENCOUNTER — HOSPITAL ENCOUNTER (INPATIENT)
Age: 74
LOS: 7 days | Discharge: HOME | DRG: 193 | End: 2024-04-06
Attending: EMERGENCY MEDICINE | Admitting: STUDENT IN AN ORGANIZED HEALTH CARE EDUCATION/TRAINING PROGRAM
Payer: MEDICARE

## 2024-03-28 DIAGNOSIS — J44.1 COPD EXACERBATION (HCC): ICD-10-CM

## 2024-03-28 DIAGNOSIS — J96.01 ACUTE HYPOXIC RESPIRATORY FAILURE (HCC): Primary | ICD-10-CM

## 2024-03-28 DIAGNOSIS — J18.9 PNEUMONIA OF LEFT LOWER LOBE DUE TO INFECTIOUS ORGANISM: ICD-10-CM

## 2024-03-28 PROBLEM — J96.21 ACUTE ON CHRONIC RESPIRATORY FAILURE WITH HYPOXIA (HCC): Status: ACTIVE | Noted: 2024-01-14

## 2024-03-28 LAB
ALBUMIN SERPL-MCNC: 4.7 G/DL (ref 3.5–5.2)
ALP SERPL-CCNC: 74 U/L (ref 40–129)
ALT SERPL-CCNC: 11 U/L (ref 0–40)
ANION GAP SERPL CALCULATED.3IONS-SCNC: 13 MMOL/L (ref 7–16)
AST SERPL-CCNC: 20 U/L (ref 0–39)
BASOPHILS # BLD: 0.04 K/UL (ref 0–0.2)
BASOPHILS NFR BLD: 1 % (ref 0–2)
BILIRUB SERPL-MCNC: 0.7 MG/DL (ref 0–1.2)
BNP SERPL-MCNC: 3008 PG/ML (ref 0–125)
BUN SERPL-MCNC: 12 MG/DL (ref 6–23)
CALCIUM SERPL-MCNC: 10.3 MG/DL (ref 8.6–10.2)
CHLORIDE SERPL-SCNC: 105 MMOL/L (ref 98–107)
CO2 SERPL-SCNC: 23 MMOL/L (ref 22–29)
CREAT SERPL-MCNC: 0.8 MG/DL (ref 0.7–1.2)
EKG ATRIAL RATE: 78 BPM
EKG P AXIS: 50 DEGREES
EKG P-R INTERVAL: 198 MS
EKG Q-T INTERVAL: 410 MS
EKG QRS DURATION: 90 MS
EKG QTC CALCULATION (BAZETT): 467 MS
EKG R AXIS: 38 DEGREES
EKG T AXIS: 23 DEGREES
EKG VENTRICULAR RATE: 78 BPM
EOSINOPHIL # BLD: 0.08 K/UL (ref 0.05–0.5)
EOSINOPHILS RELATIVE PERCENT: 1 % (ref 0–6)
ERYTHROCYTE [DISTWIDTH] IN BLOOD BY AUTOMATED COUNT: 15.5 % (ref 11.5–15)
GFR SERPL CREATININE-BSD FRML MDRD: >90 ML/MIN/1.73M2
GLUCOSE SERPL-MCNC: 91 MG/DL (ref 74–99)
HCT VFR BLD AUTO: 51.7 % (ref 37–54)
HGB BLD-MCNC: 17 G/DL (ref 12.5–16.5)
IMM GRANULOCYTES # BLD AUTO: 0.04 K/UL (ref 0–0.58)
IMM GRANULOCYTES NFR BLD: 1 % (ref 0–5)
INFLUENZA A BY PCR: NOT DETECTED
INFLUENZA B BY PCR: NOT DETECTED
INR PPP: 1
LACTATE BLDV-SCNC: 1.5 MMOL/L (ref 0.5–2.2)
LYMPHOCYTES NFR BLD: 1.28 K/UL (ref 1.5–4)
LYMPHOCYTES RELATIVE PERCENT: 16 % (ref 20–42)
MCH RBC QN AUTO: 31.1 PG (ref 26–35)
MCHC RBC AUTO-ENTMCNC: 32.9 G/DL (ref 32–34.5)
MCV RBC AUTO: 94.5 FL (ref 80–99.9)
MONOCYTES NFR BLD: 0.41 K/UL (ref 0.1–0.95)
MONOCYTES NFR BLD: 5 % (ref 2–12)
NEGATIVE BASE EXCESS, ART: 3.8 MMOL/L
NEUTROPHILS NFR BLD: 77 % (ref 43–80)
NEUTS SEG NFR BLD: 6.27 K/UL (ref 1.8–7.3)
O2 DELIVERY DEVICE: ABNORMAL
PLATELET # BLD AUTO: 228 K/UL (ref 130–450)
PMV BLD AUTO: 9.9 FL (ref 7–12)
POC HCO3: 19.2 MMOL/L (ref 22–26)
POC O2 SATURATION: 92.7 % (ref 92–98.5)
POC PCO2: 29.4 MM HG (ref 35–45)
POC PH: 7.42 (ref 7.35–7.45)
POC PO2: 63.2 MM HG (ref 60–80)
POTASSIUM SERPL-SCNC: 3.9 MMOL/L (ref 3.5–5)
PROT SERPL-MCNC: 7.2 G/DL (ref 6.4–8.3)
PROTHROMBIN TIME: 11.6 SEC (ref 9.3–12.4)
RBC # BLD AUTO: 5.47 M/UL (ref 3.8–5.8)
SARS-COV-2 RDRP RESP QL NAA+PROBE: NOT DETECTED
SODIUM SERPL-SCNC: 141 MMOL/L (ref 132–146)
SPECIMEN DESCRIPTION: NORMAL
TROPONIN I SERPL HS-MCNC: 23 NG/L (ref 0–11)
TROPONIN I SERPL HS-MCNC: 29 NG/L (ref 0–11)
WBC OTHER # BLD: 8.1 K/UL (ref 4.5–11.5)

## 2024-03-28 PROCEDURE — 93005 ELECTROCARDIOGRAM TRACING: CPT | Performed by: STUDENT IN AN ORGANIZED HEALTH CARE EDUCATION/TRAINING PROGRAM

## 2024-03-28 PROCEDURE — 87635 SARS-COV-2 COVID-19 AMP PRB: CPT

## 2024-03-28 PROCEDURE — 6360000004 HC RX CONTRAST MEDICATION: Performed by: RADIOLOGY

## 2024-03-28 PROCEDURE — 94640 AIRWAY INHALATION TREATMENT: CPT

## 2024-03-28 PROCEDURE — G0378 HOSPITAL OBSERVATION PER HR: HCPCS

## 2024-03-28 PROCEDURE — 71275 CT ANGIOGRAPHY CHEST: CPT

## 2024-03-28 PROCEDURE — 83880 ASSAY OF NATRIURETIC PEPTIDE: CPT

## 2024-03-28 PROCEDURE — 87502 INFLUENZA DNA AMP PROBE: CPT

## 2024-03-28 PROCEDURE — 96372 THER/PROPH/DIAG INJ SC/IM: CPT

## 2024-03-28 PROCEDURE — 84484 ASSAY OF TROPONIN QUANT: CPT

## 2024-03-28 PROCEDURE — 6360000002 HC RX W HCPCS: Performed by: STUDENT IN AN ORGANIZED HEALTH CARE EDUCATION/TRAINING PROGRAM

## 2024-03-28 PROCEDURE — 99285 EMERGENCY DEPT VISIT HI MDM: CPT

## 2024-03-28 PROCEDURE — 80053 COMPREHEN METABOLIC PANEL: CPT

## 2024-03-28 PROCEDURE — 2580000003 HC RX 258: Performed by: STUDENT IN AN ORGANIZED HEALTH CARE EDUCATION/TRAINING PROGRAM

## 2024-03-28 PROCEDURE — 99222 1ST HOSP IP/OBS MODERATE 55: CPT | Performed by: STUDENT IN AN ORGANIZED HEALTH CARE EDUCATION/TRAINING PROGRAM

## 2024-03-28 PROCEDURE — 96375 TX/PRO/DX INJ NEW DRUG ADDON: CPT

## 2024-03-28 PROCEDURE — 6370000000 HC RX 637 (ALT 250 FOR IP): Performed by: STUDENT IN AN ORGANIZED HEALTH CARE EDUCATION/TRAINING PROGRAM

## 2024-03-28 PROCEDURE — APPSS45 APP SPLIT SHARED TIME 31-45 MINUTES: Performed by: NURSE PRACTITIONER

## 2024-03-28 PROCEDURE — 96365 THER/PROPH/DIAG IV INF INIT: CPT

## 2024-03-28 PROCEDURE — 93010 ELECTROCARDIOGRAM REPORT: CPT | Performed by: INTERNAL MEDICINE

## 2024-03-28 PROCEDURE — 2500000003 HC RX 250 WO HCPCS: Performed by: STUDENT IN AN ORGANIZED HEALTH CARE EDUCATION/TRAINING PROGRAM

## 2024-03-28 PROCEDURE — 5A09357 ASSISTANCE WITH RESPIRATORY VENTILATION, LESS THAN 24 CONSECUTIVE HOURS, CONTINUOUS POSITIVE AIRWAY PRESSURE: ICD-10-PCS | Performed by: STUDENT IN AN ORGANIZED HEALTH CARE EDUCATION/TRAINING PROGRAM

## 2024-03-28 PROCEDURE — 83605 ASSAY OF LACTIC ACID: CPT

## 2024-03-28 PROCEDURE — 85025 COMPLETE CBC W/AUTO DIFF WBC: CPT

## 2024-03-28 PROCEDURE — 94664 DEMO&/EVAL PT USE INHALER: CPT

## 2024-03-28 PROCEDURE — 94660 CPAP INITIATION&MGMT: CPT

## 2024-03-28 PROCEDURE — 82803 BLOOD GASES ANY COMBINATION: CPT

## 2024-03-28 PROCEDURE — 85610 PROTHROMBIN TIME: CPT

## 2024-03-28 RX ORDER — BUMETANIDE 1 MG/1
1 TABLET ORAL
COMMUNITY

## 2024-03-28 RX ORDER — POTASSIUM CHLORIDE 20 MEQ/1
40 TABLET, EXTENDED RELEASE ORAL PRN
Status: DISCONTINUED | OUTPATIENT
Start: 2024-03-28 | End: 2024-04-06 | Stop reason: HOSPADM

## 2024-03-28 RX ORDER — POLYETHYLENE GLYCOL 3350 17 G/17G
17 POWDER, FOR SOLUTION ORAL DAILY PRN
Status: DISCONTINUED | OUTPATIENT
Start: 2024-03-28 | End: 2024-04-06 | Stop reason: HOSPADM

## 2024-03-28 RX ORDER — POTASSIUM CHLORIDE 7.45 MG/ML
10 INJECTION INTRAVENOUS PRN
Status: DISCONTINUED | OUTPATIENT
Start: 2024-03-28 | End: 2024-04-06 | Stop reason: HOSPADM

## 2024-03-28 RX ORDER — BUDESONIDE 0.5 MG/2ML
500 INHALANT ORAL
Status: DISCONTINUED | OUTPATIENT
Start: 2024-03-28 | End: 2024-04-02

## 2024-03-28 RX ORDER — SODIUM CHLORIDE 0.9 % (FLUSH) 0.9 %
5-40 SYRINGE (ML) INJECTION PRN
Status: DISCONTINUED | OUTPATIENT
Start: 2024-03-28 | End: 2024-04-06 | Stop reason: HOSPADM

## 2024-03-28 RX ORDER — SODIUM CHLORIDE 0.9 % (FLUSH) 0.9 %
5-40 SYRINGE (ML) INJECTION EVERY 12 HOURS SCHEDULED
Status: DISCONTINUED | OUTPATIENT
Start: 2024-03-28 | End: 2024-04-06 | Stop reason: HOSPADM

## 2024-03-28 RX ORDER — LANOLIN ALCOHOL/MO/W.PET/CERES
3 CREAM (GRAM) TOPICAL NIGHTLY PRN
Status: DISCONTINUED | OUTPATIENT
Start: 2024-03-28 | End: 2024-04-06 | Stop reason: HOSPADM

## 2024-03-28 RX ORDER — IPRATROPIUM BROMIDE AND ALBUTEROL SULFATE 2.5; .5 MG/3ML; MG/3ML
3 SOLUTION RESPIRATORY (INHALATION) ONCE
Status: COMPLETED | OUTPATIENT
Start: 2024-03-28 | End: 2024-03-28

## 2024-03-28 RX ORDER — ARFORMOTEROL TARTRATE 15 UG/2ML
15 SOLUTION RESPIRATORY (INHALATION)
Status: DISCONTINUED | OUTPATIENT
Start: 2024-03-28 | End: 2024-04-06 | Stop reason: HOSPADM

## 2024-03-28 RX ORDER — ACETAMINOPHEN 325 MG/1
650 TABLET ORAL EVERY 6 HOURS PRN
Status: DISCONTINUED | OUTPATIENT
Start: 2024-03-28 | End: 2024-04-06 | Stop reason: HOSPADM

## 2024-03-28 RX ORDER — SODIUM CHLORIDE 9 MG/ML
INJECTION, SOLUTION INTRAVENOUS PRN
Status: DISCONTINUED | OUTPATIENT
Start: 2024-03-28 | End: 2024-04-06 | Stop reason: HOSPADM

## 2024-03-28 RX ORDER — METHYLPREDNISOLONE SODIUM SUCCINATE 125 MG/2ML
125 INJECTION, POWDER, LYOPHILIZED, FOR SOLUTION INTRAMUSCULAR; INTRAVENOUS ONCE
Status: COMPLETED | OUTPATIENT
Start: 2024-03-28 | End: 2024-03-28

## 2024-03-28 RX ORDER — ENOXAPARIN SODIUM 100 MG/ML
40 INJECTION SUBCUTANEOUS DAILY
Status: DISCONTINUED | OUTPATIENT
Start: 2024-03-28 | End: 2024-04-06 | Stop reason: HOSPADM

## 2024-03-28 RX ORDER — ONDANSETRON 2 MG/ML
4 INJECTION INTRAMUSCULAR; INTRAVENOUS EVERY 6 HOURS PRN
Status: DISCONTINUED | OUTPATIENT
Start: 2024-03-28 | End: 2024-04-06 | Stop reason: HOSPADM

## 2024-03-28 RX ORDER — BUMETANIDE 1 MG/1
1 TABLET ORAL
Status: DISCONTINUED | OUTPATIENT
Start: 2024-03-29 | End: 2024-03-29

## 2024-03-28 RX ORDER — ACETAMINOPHEN 650 MG/1
650 SUPPOSITORY RECTAL EVERY 6 HOURS PRN
Status: DISCONTINUED | OUTPATIENT
Start: 2024-03-28 | End: 2024-04-06 | Stop reason: HOSPADM

## 2024-03-28 RX ORDER — MAGNESIUM SULFATE IN WATER 40 MG/ML
2000 INJECTION, SOLUTION INTRAVENOUS PRN
Status: DISCONTINUED | OUTPATIENT
Start: 2024-03-28 | End: 2024-04-06 | Stop reason: HOSPADM

## 2024-03-28 RX ORDER — ONDANSETRON 4 MG/1
4 TABLET, ORALLY DISINTEGRATING ORAL EVERY 8 HOURS PRN
Status: DISCONTINUED | OUTPATIENT
Start: 2024-03-28 | End: 2024-04-06 | Stop reason: HOSPADM

## 2024-03-28 RX ORDER — IPRATROPIUM BROMIDE AND ALBUTEROL SULFATE 2.5; .5 MG/3ML; MG/3ML
1 SOLUTION RESPIRATORY (INHALATION) EVERY 4 HOURS PRN
Status: DISCONTINUED | OUTPATIENT
Start: 2024-03-28 | End: 2024-04-06 | Stop reason: HOSPADM

## 2024-03-28 RX ADMIN — SODIUM CHLORIDE, PRESERVATIVE FREE 10 ML: 5 INJECTION INTRAVENOUS at 19:48

## 2024-03-28 RX ADMIN — IPRATROPIUM BROMIDE AND ALBUTEROL SULFATE 3 DOSE: 2.5; .5 SOLUTION RESPIRATORY (INHALATION) at 12:07

## 2024-03-28 RX ADMIN — DOXYCYCLINE 100 MG: 100 INJECTION, POWDER, LYOPHILIZED, FOR SOLUTION INTRAVENOUS at 13:58

## 2024-03-28 RX ADMIN — ENOXAPARIN SODIUM 40 MG: 100 INJECTION SUBCUTANEOUS at 15:25

## 2024-03-28 RX ADMIN — ARFORMOTEROL TARTRATE 15 MCG: 15 SOLUTION RESPIRATORY (INHALATION) at 19:54

## 2024-03-28 RX ADMIN — METHYLPREDNISOLONE SODIUM SUCCINATE 125 MG: 125 INJECTION INTRAMUSCULAR; INTRAVENOUS at 13:50

## 2024-03-28 RX ADMIN — IPRATROPIUM BROMIDE AND ALBUTEROL SULFATE 1 DOSE: .5; 2.5 SOLUTION RESPIRATORY (INHALATION) at 19:54

## 2024-03-28 RX ADMIN — CEFTRIAXONE 1000 MG: 1 INJECTION, POWDER, FOR SOLUTION INTRAMUSCULAR; INTRAVENOUS at 13:50

## 2024-03-28 RX ADMIN — IOPAMIDOL 75 ML: 755 INJECTION, SOLUTION INTRAVENOUS at 11:58

## 2024-03-28 RX ADMIN — BUDESONIDE INHALATION 500 MCG: 0.5 SUSPENSION RESPIRATORY (INHALATION) at 19:54

## 2024-03-28 ASSESSMENT — PAIN - FUNCTIONAL ASSESSMENT
PAIN_FUNCTIONAL_ASSESSMENT: NONE - DENIES PAIN
PAIN_FUNCTIONAL_ASSESSMENT: NONE - DENIES PAIN

## 2024-03-28 NOTE — PROGRESS NOTES
Pulmonary consult called to Dr Little's service. All details of consult provided. Await response.

## 2024-03-28 NOTE — ED PROVIDER NOTES
[facility] EMERGENCY DEPARTMENT  EMERGENCY DEPARTMENT ENCOUNTER      Pt Name: Jasno Cox  MRN: [id number]  Birthdate 1950  Date of evaluation: 3/28/2024  Provider: Silvino Fagan DO  PCP: Juni Loyd MD  Note Started: 11:47 AM EDT 3/28/24    CHIEF COMPLAINT       Chief Complaint   Patient presents with    Shortness of Breath     Increased shortness of breath stuffy nose productive cough for a week       HISTORY OF PRESENT ILLNESS: 1 or more Elements   History From: Patient  Limitations to history : None    Jason Cox is a 73 y.o. male who presents to the ED due to shortness of breath.  Patient states that he has been having some mild congestion over the past week.  He says that he has been having increased shortness of breath over the past several days.  He wears a baseline 5 L of oxygen via nasal cannula which has been up to around 8 L via nasal cannula over the past several days.  He says that he was hypoxic yesterday at one of his doctors appointments which resolved after being on oxygen for a while while being monitored.  Says he has some minor generalized chest tightness without significant focal pain.  Denies any nausea or vomiting.  Denies any recent fever or chills.  He does report a slightly productive cough associated with the symptoms.    Nursing Notes were all reviewed and agreed with or any disagreements were addressed in the HPI.    REVIEW OF SYSTEMS :    Positives and Pertinent negatives as per HPI.     PAST MEDICAL HISTORY/Chronic Conditions Affecting Care    has a past medical history of Acute and chronic respiratory failure with hypoxia ([facility]) (10/12/2017), Acute exacerbation of chronic obstructive pulmonary disease (COPD) ([facility]) (8/7/2023), Acute heart failure with preserved ejection fraction ([facility]) (04/08/2021), Acute respiratory disease due to severe acute respiratory syndrome coronavirus 2 (SARS-CoV-2) (01/01/2021), Acute respiratory failure  with hypoxia ([facility]) (11/12/2018), Bullous emphysema ([facility]) (11/12/2018), Chronic back pain, Colon cancer screening, COPD (chronic obstructive pulmonary disease) ([facility]), Coronavirus infection (02/02/2022), Cough with hemoptysis (04/23/2019), 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 ([facility]) (08/15/2019), Emphysema lung ([facility]), Glucose intolerance (06/10/2019), Hilar adenopathy (06/10/2019), Hypophosphatemia (06/10/2019), Immunocompromised ([facility]) (02/03/2022), Infection due to parainfluenza virus 3 (06/10/2019), Iron deficiency (02/03/2022), Left upper lobe pneumonia (10/12/2017), Pleural effusion on right (10/03/2019), Pulmonary emphysema with fibrosis of lung ([facility]) (11/15/2018), Pulmonary hypertension ([facility]) (8/9/2023), Pulmonary Mycobacterium avium complex (MAC) infection ([facility]) (07/12/2019), Rhinovirus infection (10/16/2020), Right lower lobe pneumonia (11/15/2018), RSV (acute bronchiolitis due to respiratory syncytial virus) (11/18/2022), S/P lumbar fusion (06/01/2019), and Thoracic ascending aortic aneurysm ([facility]) (11/15/2018).     SURGICAL HISTORY     Past Surgical History:   Procedure Laterality Date    ABSCESS DRAINAGE  2006    X2 RECTAL ABSCESS    BRONCHOSCOPY N/A 06/12/2019    BRONCHOSCOPY BRUSHINGS performed by Rolf Little MD at [facility] ENDOSCOPY    BRONCHOSCOPY N/A 10/07/2019    BRONCHOSCOPY DIAGNOSTIC OR CELL WASH ONLY performed by Rolf Little MD at [facility] ENDOSCOPY    BRONCHOSCOPY N/A 04/12/2021    BRONCHOSCOPY performed by Rolf Little MD at [facility] OR    COLONOSCOPY  11/18/2013    COLONOSCOPY N/A 5/18/2023    COLONOSCOPY POLYPECTOMY SNARE/COLD BIOPSY performed by Frankie Portillo MD at [facility] ENDOSCOPY    HC INSERT PICC CATH, 5/> YRS  04/10/2021         LUMBAR FUSION  03/2019    [facility]       CURRENTMEDICATIONS       Previous Medications    AZITHROMYCIN (ZITHROMAX) 500 MG TABLET    Take 1 tablet by mouth three times a week *MON-WED-FRI*    BUDESONIDE (PULMICORT) 0.5

## 2024-03-28 NOTE — PROGRESS NOTES
4 Eyes Skin Assessment     NAME:  Jason Cox  YOB: 1950  MEDICAL RECORD NUMBER:  [id number]    The patient is being assessed for  Admission    I agree that at least one RN has performed a thorough Head to Toe Skin Assessment on the patient. ALL assessment sites listed below have been assessed.      Areas assessed by both nurses:    Head, Face, Ears, Shoulders, Back, Chest, Arms, Elbows, Hands, Sacrum. Buttock, Coccyx, Ischium, Legs. Feet and Heels, Under Medical Devices , and Other          Does the Patient have a Wound? No noted wound(s)       Sandro Prevention initiated by RN: Yes  Wound Care Orders initiated by RN: No    Pressure Injury (Stage 3,4, Unstageable, DTI, NWPT, and Complex wounds) if present, place Wound referral order by RN under : No    New Ostomies, if present place, Ostomy referral order under : No     Nurse 1 eSignature: Electronically signed by IVANIA GUTIERREZ RN on 3/28/24 at 5:22 PM EDT    **SHARE this note so that the co-signing nurse can place an eSignature**    Nurse 2 eSignature: Electronically signed by Esme Rangel RN on 3/28/24 at 5:23 PM EDT

## 2024-03-28 NOTE — ED NOTES
Radiology Procedure Waiver   Name: Jason Cox  : 1950  MRN: [id number]    Date:  3/28/24    Time: 11:45 AM EDT    Benefits of immediately proceeding with Radiology exam(s) without pre-testing outweigh the risks or are not indicated as specified below and therefore the following is/are being waived:    [] Pregnancy test   [] Patients LMP on-time and regular.   [] Patient had Tubal Ligation or has other Contraception Device.   [] Patient  is Menopausal or Premenarcheal.    [] Patient had Full or Partial Hysterectomy.    [] Protocol for Iodine allergy    [] MRI Questionnaire     [x] BUN/Creatinine   [] Patient age w/no hx of renal dysfunction.   [] Patient on Dialysis.   [x] Recent Normal Labs.  Electronically signed by Silvino Fagan DO on 3/28/24 at 11:45 AM EDT

## 2024-03-28 NOTE — PROGRESS NOTES
Database initiated. Patient is A&O independent from home with wife. States he uses no assistive devices and wears 7 liters oxygen at baseline through [employer]. He also has a concentrator.

## 2024-03-28 NOTE — H&P
[facility] Hospitalist Group   History and Physical      CHIEF COMPLAINT:  SOB    History of Present Illness:  73 y.o. male with a history of Chronic Respiratory Failure with Hypoxia, COPD, Pulmonary Hypertension, Iron Deficiency, Bollous Emphysema, IgG Deficiency, Orthostatic Hypotension presents with increased SOB. Pt states he has developed nasal congestion over the last wek or so. Over the last several days he has developed worsening SOB. Chronically wears 5L NC at home, however has required up to 8L HFNC over the last several days. States he is prescribed Bumex at home, however stopped taking about a week ago. He denies fevers, chills, N/V/D, CP. Pt received Duoneb, Ceftriaxone 2,000mg IV, Doxycycline 100mg IV, and Solumedrol 125mg IV in ED course. Decision to admit pt for further evaluation and treatment.      Informant(s) for H&P: Pt and EMR    REVIEW OF SYSTEMS:  Admits to increased SOB. Denies fevers, chills, cp,  n/v, ha, vision/hearing changes, wt changes, hot/cold flashes, other open skin lesions, diarrhea, constipation, dysuria/hematuria unless noted in HPI. Complete ROS performed with the patient and is otherwise negative.      PMH:  Past Medical History:   Diagnosis Date    Acute and chronic respiratory failure with hypoxia ([facility]) 10/12/2017    chronic oxygen use    Acute exacerbation of chronic obstructive pulmonary disease (COPD) ([facility]) 8/7/2023    Acute heart failure with preserved ejection fraction ([facility]) 04/08/2021    follows with PCP    Acute respiratory disease due to severe acute respiratory syndrome coronavirus 2 (SARS-CoV-2) 01/01/2021    Acute respiratory failure with hypoxia ([facility]) 11/12/2018    Bullous emphysema ([facility]) 11/12/2018    Chronic back pain     Degeneration of umbar intervertebral disc    Colon cancer screening     COPD (chronic obstructive pulmonary disease) ([facility])     Coronavirus infection 02/02/2022    Coronavirus 229E    Cough with hemoptysis 04/23/2019

## 2024-03-28 NOTE — ED NOTES
ED to Inpatient Handoff Report    Notified 6w that electronic handoff available and patient ready for transport to room 607.    Safety Risks: None identified    Patient in Restraints: no    Constant Observer or Patient : no    Telemetry Monitoring Ordered :Yes      Cardiac Rhythm: Sinus rhythm    Order to transfer to unit without monitor:NO    Last MEWS: 2 Time completed: 1645    Deterioration Index Score:   Predictive Model Details          37 (Caution)  Factor Value    Calculated 3/28/2024 16:46 31% Age 73 years old    Deterioration Index Model 30% Supplemental oxygen PAP (positive airway pressure)     28% Respiratory rate 24     4% Pulse oximetry 100 %     3% Pulse 94     2% Systolic 125     2% Sodium 141 mmol/L     1% Hematocrit 51.7 %     0% WBC count 8.1 k/uL     0% Temperature 98 °F (36.7 °C)     0% Potassium 3.9 mmol/L        Vitals:    03/28/24 1530 03/28/24 1620 03/28/24 1630 03/28/24 1645   BP: 119/76  115/82 125/78   Pulse: 78 79 83 94   Resp: 18 23 19 24   Temp:    98 °F (36.7 °C)   TempSrc:    Oral   SpO2: 100% 100% 92% 100%   Weight:             Opportunity for questions and clarification was provided.

## 2024-03-29 LAB
ANION GAP SERPL CALCULATED.3IONS-SCNC: 10 MMOL/L (ref 7–16)
BASOPHILS # BLD: 0.01 K/UL (ref 0–0.2)
BASOPHILS NFR BLD: 0 % (ref 0–2)
BUN SERPL-MCNC: 9 MG/DL (ref 6–23)
CALCIUM SERPL-MCNC: 9.9 MG/DL (ref 8.6–10.2)
CHLORIDE SERPL-SCNC: 102 MMOL/L (ref 98–107)
CO2 SERPL-SCNC: 26 MMOL/L (ref 22–29)
CREAT SERPL-MCNC: 0.6 MG/DL (ref 0.7–1.2)
EOSINOPHIL # BLD: 0 K/UL (ref 0.05–0.5)
EOSINOPHILS RELATIVE PERCENT: 0 % (ref 0–6)
ERYTHROCYTE [DISTWIDTH] IN BLOOD BY AUTOMATED COUNT: 15.3 % (ref 11.5–15)
GFR SERPL CREATININE-BSD FRML MDRD: >90 ML/MIN/1.73M2
GLUCOSE SERPL-MCNC: 81 MG/DL (ref 74–99)
HCT VFR BLD AUTO: 44.2 % (ref 37–54)
HGB BLD-MCNC: 14.5 G/DL (ref 12.5–16.5)
IMM GRANULOCYTES # BLD AUTO: <0.03 K/UL (ref 0–0.58)
IMM GRANULOCYTES NFR BLD: 0 % (ref 0–5)
LYMPHOCYTES NFR BLD: 0.55 K/UL (ref 1.5–4)
LYMPHOCYTES RELATIVE PERCENT: 7 % (ref 20–42)
MCH RBC QN AUTO: 30.8 PG (ref 26–35)
MCHC RBC AUTO-ENTMCNC: 32.8 G/DL (ref 32–34.5)
MCV RBC AUTO: 93.8 FL (ref 80–99.9)
MONOCYTES NFR BLD: 0.56 K/UL (ref 0.1–0.95)
MONOCYTES NFR BLD: 8 % (ref 2–12)
NEUTROPHILS NFR BLD: 85 % (ref 43–80)
NEUTS SEG NFR BLD: 6.27 K/UL (ref 1.8–7.3)
PLATELET # BLD AUTO: 197 K/UL (ref 130–450)
PMV BLD AUTO: 10.4 FL (ref 7–12)
POTASSIUM SERPL-SCNC: 4 MMOL/L (ref 3.5–5)
RBC # BLD AUTO: 4.71 M/UL (ref 3.8–5.8)
RBC # BLD: NORMAL 10*6/UL
SODIUM SERPL-SCNC: 138 MMOL/L (ref 132–146)
WBC OTHER # BLD: 7.4 K/UL (ref 4.5–11.5)

## 2024-03-29 PROCEDURE — 94667 MNPJ CHEST WALL 1ST: CPT

## 2024-03-29 PROCEDURE — 96367 TX/PROPH/DG ADDL SEQ IV INF: CPT

## 2024-03-29 PROCEDURE — 94660 CPAP INITIATION&MGMT: CPT

## 2024-03-29 PROCEDURE — 2500000003 HC RX 250 WO HCPCS: Performed by: STUDENT IN AN ORGANIZED HEALTH CARE EDUCATION/TRAINING PROGRAM

## 2024-03-29 PROCEDURE — G0378 HOSPITAL OBSERVATION PER HR: HCPCS

## 2024-03-29 PROCEDURE — 94640 AIRWAY INHALATION TREATMENT: CPT

## 2024-03-29 PROCEDURE — 36415 COLL VENOUS BLD VENIPUNCTURE: CPT

## 2024-03-29 PROCEDURE — 87205 SMEAR GRAM STAIN: CPT

## 2024-03-29 PROCEDURE — 99232 SBSQ HOSP IP/OBS MODERATE 35: CPT | Performed by: STUDENT IN AN ORGANIZED HEALTH CARE EDUCATION/TRAINING PROGRAM

## 2024-03-29 PROCEDURE — 96376 TX/PRO/DX INJ SAME DRUG ADON: CPT

## 2024-03-29 PROCEDURE — 85025 COMPLETE CBC W/AUTO DIFF WBC: CPT

## 2024-03-29 PROCEDURE — 96372 THER/PROPH/DIAG INJ SC/IM: CPT

## 2024-03-29 PROCEDURE — 6360000002 HC RX W HCPCS: Performed by: STUDENT IN AN ORGANIZED HEALTH CARE EDUCATION/TRAINING PROGRAM

## 2024-03-29 PROCEDURE — 2700000000 HC OXYGEN THERAPY PER DAY

## 2024-03-29 PROCEDURE — 87070 CULTURE OTHR SPECIMN AEROBIC: CPT

## 2024-03-29 PROCEDURE — 80048 BASIC METABOLIC PNL TOTAL CA: CPT

## 2024-03-29 PROCEDURE — 6370000000 HC RX 637 (ALT 250 FOR IP): Performed by: INTERNAL MEDICINE

## 2024-03-29 PROCEDURE — 2580000003 HC RX 258: Performed by: STUDENT IN AN ORGANIZED HEALTH CARE EDUCATION/TRAINING PROGRAM

## 2024-03-29 PROCEDURE — 87449 NOS EACH ORGANISM AG IA: CPT

## 2024-03-29 PROCEDURE — 87899 AGENT NOS ASSAY W/OPTIC: CPT

## 2024-03-29 PROCEDURE — 6360000002 HC RX W HCPCS: Performed by: INTERNAL MEDICINE

## 2024-03-29 PROCEDURE — 2580000003 HC RX 258: Performed by: INTERNAL MEDICINE

## 2024-03-29 PROCEDURE — 6370000000 HC RX 637 (ALT 250 FOR IP): Performed by: STUDENT IN AN ORGANIZED HEALTH CARE EDUCATION/TRAINING PROGRAM

## 2024-03-29 PROCEDURE — 96366 THER/PROPH/DIAG IV INF ADDON: CPT

## 2024-03-29 PROCEDURE — 6370000000 HC RX 637 (ALT 250 FOR IP): Performed by: NURSE PRACTITIONER

## 2024-03-29 RX ORDER — BUMETANIDE 1 MG/1
1 TABLET ORAL
Status: DISCONTINUED | OUTPATIENT
Start: 2024-03-29 | End: 2024-04-03

## 2024-03-29 RX ORDER — OXYCODONE AND ACETAMINOPHEN 7.5; 325 MG/1; MG/1
1 TABLET ORAL 2 TIMES DAILY PRN
Status: DISCONTINUED | OUTPATIENT
Start: 2024-03-29 | End: 2024-03-29 | Stop reason: CLARIF

## 2024-03-29 RX ORDER — METHYLPREDNISOLONE SODIUM SUCCINATE 40 MG/ML
40 INJECTION, POWDER, LYOPHILIZED, FOR SOLUTION INTRAMUSCULAR; INTRAVENOUS ONCE
Status: COMPLETED | OUTPATIENT
Start: 2024-03-29 | End: 2024-03-29

## 2024-03-29 RX ORDER — OXYCODONE HYDROCHLORIDE 5 MG/1
2.5 TABLET ORAL 2 TIMES DAILY PRN
Status: DISCONTINUED | OUTPATIENT
Start: 2024-03-29 | End: 2024-04-06 | Stop reason: HOSPADM

## 2024-03-29 RX ORDER — IPRATROPIUM BROMIDE AND ALBUTEROL SULFATE 2.5; .5 MG/3ML; MG/3ML
1 SOLUTION RESPIRATORY (INHALATION)
Status: DISCONTINUED | OUTPATIENT
Start: 2024-03-29 | End: 2024-04-06 | Stop reason: HOSPADM

## 2024-03-29 RX ORDER — AZITHROMYCIN 250 MG/1
250 TABLET, FILM COATED ORAL DAILY
Status: COMPLETED | OUTPATIENT
Start: 2024-03-29 | End: 2024-04-04

## 2024-03-29 RX ORDER — OXYCODONE HYDROCHLORIDE AND ACETAMINOPHEN 5; 325 MG/1; MG/1
1 TABLET ORAL 2 TIMES DAILY PRN
Status: DISCONTINUED | OUTPATIENT
Start: 2024-03-29 | End: 2024-04-06 | Stop reason: HOSPADM

## 2024-03-29 RX ADMIN — ARFORMOTEROL TARTRATE 15 MCG: 15 SOLUTION RESPIRATORY (INHALATION) at 20:30

## 2024-03-29 RX ADMIN — BUMETANIDE 1 MG: 1 TABLET ORAL at 19:54

## 2024-03-29 RX ADMIN — IPRATROPIUM BROMIDE AND ALBUTEROL SULFATE 1 DOSE: 2.5; .5 SOLUTION RESPIRATORY (INHALATION) at 16:27

## 2024-03-29 RX ADMIN — BUDESONIDE INHALATION 500 MCG: 0.5 SUSPENSION RESPIRATORY (INHALATION) at 07:53

## 2024-03-29 RX ADMIN — METHYLPREDNISOLONE SODIUM SUCCINATE 40 MG: 40 INJECTION INTRAMUSCULAR; INTRAVENOUS at 14:53

## 2024-03-29 RX ADMIN — OXYCODONE AND ACETAMINOPHEN 1 TABLET: 5; 325 TABLET ORAL at 03:42

## 2024-03-29 RX ADMIN — DOXYCYCLINE 100 MG: 100 INJECTION, POWDER, LYOPHILIZED, FOR SOLUTION INTRAVENOUS at 02:56

## 2024-03-29 RX ADMIN — OXYCODONE 2.5 MG: 5 TABLET ORAL at 03:42

## 2024-03-29 RX ADMIN — IPRATROPIUM BROMIDE AND ALBUTEROL SULFATE 1 DOSE: 2.5; .5 SOLUTION RESPIRATORY (INHALATION) at 20:30

## 2024-03-29 RX ADMIN — ENOXAPARIN SODIUM 40 MG: 100 INJECTION SUBCUTANEOUS at 08:52

## 2024-03-29 RX ADMIN — CEFEPIME 2000 MG: 2 INJECTION, POWDER, FOR SOLUTION INTRAVENOUS at 14:58

## 2024-03-29 RX ADMIN — ARFORMOTEROL TARTRATE 15 MCG: 15 SOLUTION RESPIRATORY (INHALATION) at 07:53

## 2024-03-29 RX ADMIN — SODIUM CHLORIDE, PRESERVATIVE FREE 10 ML: 5 INJECTION INTRAVENOUS at 19:54

## 2024-03-29 RX ADMIN — SODIUM CHLORIDE, PRESERVATIVE FREE 10 ML: 5 INJECTION INTRAVENOUS at 08:52

## 2024-03-29 RX ADMIN — BUDESONIDE INHALATION 500 MCG: 0.5 SUSPENSION RESPIRATORY (INHALATION) at 20:30

## 2024-03-29 RX ADMIN — AZITHROMYCIN 250 MG: 250 TABLET, FILM COATED ORAL at 14:53

## 2024-03-29 ASSESSMENT — PAIN SCALES - GENERAL
PAINLEVEL_OUTOF10: 6
PAINLEVEL_OUTOF10: 0

## 2024-03-29 ASSESSMENT — PAIN DESCRIPTION - ORIENTATION: ORIENTATION: LOWER;LEFT

## 2024-03-29 ASSESSMENT — PAIN DESCRIPTION - LOCATION: LOCATION: BACK;HIP

## 2024-03-29 NOTE — CARE COORDINATION
Introduced my self and provided explanation of CM role to patient. Pt. Admitted for COPD exacerbation. CTA - left pneumonia. Pt. Currently on 15L High flow nc. Pt. states at home he wears a baseline 5L O2 nc but had to increase it to 8L over the past few days. Home supplier is [employer]. He has home concentrator 10L flow and an [employer] concentrator. Pulmonology consult is pending. IV doxycycline, & nebs. He voices he resides in a one story home  with his spouse and completes his adl's with independence, w/o assistive devices. Patient is established with Dr. Loyd and uses [facility]. Anticipated to discharge home with no needs pending pulmonology consult. Will continue to follow.  Charito LÓPEZ, RN  Case Management

## 2024-03-29 NOTE — PROGRESS NOTES
Patient is refusing bipap machine at this time, machine in room on standby if needed, patient understands to call if needed.

## 2024-03-29 NOTE — ACP (ADVANCE CARE PLANNING)
Advance Care Planning   Healthcare Decision Maker:    Primary Decision Maker: Tara Cox CLARENCE - Spouse - [phone]    Click here to complete Healthcare Decision Makers including selection of the Healthcare Decision Maker Relationship (ie \"Primary\").  Today we documented Decision Maker(s) consistent with Legal Next of Kin hierarchy.

## 2024-03-29 NOTE — PROGRESS NOTES
Patient is sitting up in bed and states he is feeling a bit better. This  was present to the o=patient through attentive listening, words of support and encouragement as well as prayer. Patient states he is looking forward to feeling well enough to return home and expressed appreciation for the visit.

## 2024-03-29 NOTE — PLAN OF CARE
Problem: ABCDS Injury Assessment  Goal: Absence of physical injury  3/28/2024 2116 by Isatu Landry RN  Outcome: Progressing  3/28/2024 1741 by Paige Hastings RN  Outcome: Progressing     Problem: Discharge Planning  Goal: Discharge to home or other facility with appropriate resources  3/28/2024 2116 by Isatu Landry RN  Outcome: Progressing  3/28/2024 1741 by Paige Hastings RN  Outcome: Progressing     Problem: Safety - Adult  Goal: Free from fall injury  3/28/2024 2116 by Isatu Landry RN  Outcome: Progressing  3/28/2024 1741 by Paige Hastings RN  Outcome: Progressing

## 2024-03-29 NOTE — PROGRESS NOTES
[facility] Hospitalist Progress Note    Admitting Date and Time: 3/28/2024 11:38 AM  Admit Dx: COPD exacerbation (HCC) [J44.1]  Pneumonia of left lower lobe due to infectious organism [J18.9]  Acute hypoxic respiratory failure (HCC) [J96.01]    Subjective:  Patient is being followed for COPD exacerbation (HCC) [J44.1]  Pneumonia of left lower lobe due to infectious organism [J18.9]  Acute hypoxic respiratory failure (HCC) [J96.01]   Pt feels okay  Per RN: no additional concerns    ROS: denies fever, chills, cp, sob, n/v, HA unless stated above.      sodium chloride flush  5-40 mL IntraVENous 2 times per day    enoxaparin  40 mg SubCUTAneous Daily    doxycycline (VIBRAMYCIN) IV  100 mg IntraVENous Q12H    cefTRIAXone (ROCEPHIN) IV  1,000 mg IntraVENous Q24H    budesonide  500 mcg Nebulization BID RT    bumetanide  1 mg Oral Once per day on Mon Wed Fri    arformoterol tartrate  15 mcg Nebulization BID RT     oxyCODONE-acetaminophen, 1 tablet, BID PRN   And  oxyCODONE, 2.5 mg, BID PRN  sodium chloride flush, 5-40 mL, PRN  sodium chloride, , PRN  potassium chloride, 40 mEq, PRN   Or  potassium alternative oral replacement, 40 mEq, PRN   Or  potassium chloride, 10 mEq, PRN  magnesium sulfate, 2,000 mg, PRN  ondansetron, 4 mg, Q8H PRN   Or  ondansetron, 4 mg, Q6H PRN  polyethylene glycol, 17 g, Daily PRN  acetaminophen, 650 mg, Q6H PRN   Or  acetaminophen, 650 mg, Q6H PRN  melatonin, 3 mg, Nightly PRN  ipratropium 0.5 mg-albuterol 2.5 mg, 1 Dose, Q4H PRN         Objective:  /68   Pulse 78   Temp 98.2 °F (36.8 °C) (Oral)   Resp 20   Ht 1.829 m (6')   Wt 84.4 kg (186 lb)   SpO2 95%   BMI 25.23 kg/m²     General Appearance: alert and oriented to person, place and time and in NAD, sitting in bed  Skin: warm and dry  Head: normocephalic and atraumatic  Eyes: PERRL, EOMI, conjunctivae normal  Neck: neck supple, trachea midline   Pulmonary/Chest: no respiratory distress, faint wheezing, 12L

## 2024-03-29 NOTE — CONSULTS
Pulmonary Consultation    Admit Date: 3/28/2024  Requesting Physician: Juni Loyd MD    Reason for consultation:  Pneumonia  HPI:  The patient is a 73-year-old male with end-stage chronic obstructive pulmonary disease who presented to the hospital with increasing shortness of breath.  A CT scan of the chest, as seen below, evidenced a new infiltrate at the left lateral base.    Patient has known history of Mycobacterium avium complex and has been seen by infectious disease in the past.  He also is known to have history of Pseudomonas infection and colonization.  When last cultured, it was pansensitive.  According to the patient, he went for his pain management visit at [facility] and his saturation dropped as low as 56%.  He notes that on the vest at home he has been coughing up copious amounts of yellow mucus.  He denies any overt fever or chills and there has been no hemoptysis.    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    Chronic back pain     Degeneration of umbar intervertebral disc    Colon cancer screening     COPD (chronic obstructive pulmonary disease) ([facility])     Coronavirus infection 02/02/2022    Coronavirus 229E    Cough with hemoptysis 04/23/2019    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 ([facility]) 08/15/2019    First seen by ID; treatment started 9/4/2019    Emphysema lung ([facility])     Glucose intolerance 06/10/2019    Hilar adenopathy 06/10/2019    Hypophosphatemia  06/10/2019    Immunocompromised (HCC) 02/03/2022    Infection due to parainfluenza virus 3 06/10/2019    Iron deficiency 02/03/2022    Left upper lobe pneumonia 10/12/2017    Pleural effusion on right 10/03/2019    Pulmonary emphysema with fibrosis of lung (HCC) 11/15/2018    follows with pulmonary last seen 4/6/23. yearly CT chest last done 5/15/23    Pulmonary hypertension (HCC) 8/9/2023    By CT of chest 8/8/2023    Pulmonary Mycobacterium avium complex (MAC) infection (HCC) 07/12/2019    BAL results finally completed this date. follows Dr Doe lasts seen 2/16/23    Rhinovirus infection 10/16/2020    Right lower lobe pneumonia 11/15/2018    Recurrent 4/23/19    RSV (acute bronchiolitis due to respiratory syncytial virus) 11/18/2022    S/P lumbar fusion 06/01/2019    Thoracic ascending aortic aneurysm (HCC) 11/15/2018    Proximal ascending aorta; 3.8 cm; 11/15/18     PSH:   Past Surgical History:   Procedure Laterality Date    ABSCESS DRAINAGE  2006    X2 RECTAL ABSCESS    BRONCHOSCOPY N/A 06/12/2019    BRONCHOSCOPY BRUSHINGS performed by Rolf Little MD at [facility] ENDOSCOPY    BRONCHOSCOPY N/A 10/07/2019    BRONCHOSCOPY DIAGNOSTIC OR CELL WASH ONLY performed by Rolf Little MD at [facility] ENDOSCOPY    BRONCHOSCOPY N/A 04/12/2021    BRONCHOSCOPY performed by Rolf Little MD at [facility] OR    COLONOSCOPY  11/18/2013    COLONOSCOPY N/A 5/18/2023    COLONOSCOPY POLYPECTOMY SNARE/COLD BIOPSY performed by Frankie Portillo MD at [facility] ENDOSCOPY    HC INSERT PICC CATH, 5/> YRS  04/10/2021         LUMBAR FUSION  03/2019    [facility]       Review of Systems:   Constitutional: As noted in the HPI.   Eyes: No visual changes or diplopia. No scleral icterus.  ENT: No headaches, hearing loss or vertigo. No nasal congestion, or sore throat.  Cardiovascular: No chest pain, dyspnea on exertion, or palpitations.  Respiratory: See above  Gastrointestinal: No abdominal pain, nausea or emesis. No diarrhea or rectal

## 2024-03-30 LAB
ANION GAP SERPL CALCULATED.3IONS-SCNC: 11 MMOL/L (ref 7–16)
BASOPHILS # BLD: 0.01 K/UL (ref 0–0.2)
BASOPHILS NFR BLD: 0 % (ref 0–2)
BUN SERPL-MCNC: 11 MG/DL (ref 6–23)
CALCIUM SERPL-MCNC: 9.9 MG/DL (ref 8.6–10.2)
CHLORIDE SERPL-SCNC: 100 MMOL/L (ref 98–107)
CO2 SERPL-SCNC: 26 MMOL/L (ref 22–29)
CREAT SERPL-MCNC: 0.7 MG/DL (ref 0.7–1.2)
EOSINOPHIL # BLD: 0 K/UL (ref 0.05–0.5)
EOSINOPHILS RELATIVE PERCENT: 0 % (ref 0–6)
ERYTHROCYTE [DISTWIDTH] IN BLOOD BY AUTOMATED COUNT: 15.3 % (ref 11.5–15)
GFR SERPL CREATININE-BSD FRML MDRD: >90 ML/MIN/1.73M2
GLUCOSE SERPL-MCNC: 121 MG/DL (ref 74–99)
HCT VFR BLD AUTO: 47.6 % (ref 37–54)
HGB BLD-MCNC: 15.7 G/DL (ref 12.5–16.5)
IMM GRANULOCYTES # BLD AUTO: 0.03 K/UL (ref 0–0.58)
IMM GRANULOCYTES NFR BLD: 0 % (ref 0–5)
L PNEUMO1 AG UR QL IA.RAPID: NEGATIVE
LYMPHOCYTES NFR BLD: 0.64 K/UL (ref 1.5–4)
LYMPHOCYTES RELATIVE PERCENT: 8 % (ref 20–42)
MCH RBC QN AUTO: 31.1 PG (ref 26–35)
MCHC RBC AUTO-ENTMCNC: 33 G/DL (ref 32–34.5)
MCV RBC AUTO: 94.3 FL (ref 80–99.9)
MONOCYTES NFR BLD: 0.43 K/UL (ref 0.1–0.95)
MONOCYTES NFR BLD: 5 % (ref 2–12)
NEUTROPHILS NFR BLD: 87 % (ref 43–80)
NEUTS SEG NFR BLD: 7.38 K/UL (ref 1.8–7.3)
PLATELET # BLD AUTO: 228 K/UL (ref 130–450)
PMV BLD AUTO: 10.5 FL (ref 7–12)
POTASSIUM SERPL-SCNC: 3.9 MMOL/L (ref 3.5–5)
RBC # BLD AUTO: 5.05 M/UL (ref 3.8–5.8)
S PNEUM AG SPEC QL: NEGATIVE
SODIUM SERPL-SCNC: 137 MMOL/L (ref 132–146)
SPECIMEN SOURCE: NORMAL
WBC OTHER # BLD: 8.5 K/UL (ref 4.5–11.5)

## 2024-03-30 PROCEDURE — 85025 COMPLETE CBC W/AUTO DIFF WBC: CPT

## 2024-03-30 PROCEDURE — 6370000000 HC RX 637 (ALT 250 FOR IP): Performed by: INTERNAL MEDICINE

## 2024-03-30 PROCEDURE — G0378 HOSPITAL OBSERVATION PER HR: HCPCS

## 2024-03-30 PROCEDURE — 94640 AIRWAY INHALATION TREATMENT: CPT

## 2024-03-30 PROCEDURE — 96366 THER/PROPH/DIAG IV INF ADDON: CPT

## 2024-03-30 PROCEDURE — 80048 BASIC METABOLIC PNL TOTAL CA: CPT

## 2024-03-30 PROCEDURE — 2060000000 HC ICU INTERMEDIATE R&B

## 2024-03-30 PROCEDURE — 94660 CPAP INITIATION&MGMT: CPT

## 2024-03-30 PROCEDURE — 6360000002 HC RX W HCPCS: Performed by: INTERNAL MEDICINE

## 2024-03-30 PROCEDURE — 2580000003 HC RX 258: Performed by: STUDENT IN AN ORGANIZED HEALTH CARE EDUCATION/TRAINING PROGRAM

## 2024-03-30 PROCEDURE — 2580000003 HC RX 258: Performed by: INTERNAL MEDICINE

## 2024-03-30 PROCEDURE — 6360000002 HC RX W HCPCS: Performed by: STUDENT IN AN ORGANIZED HEALTH CARE EDUCATION/TRAINING PROGRAM

## 2024-03-30 PROCEDURE — 2700000000 HC OXYGEN THERAPY PER DAY

## 2024-03-30 PROCEDURE — 99232 SBSQ HOSP IP/OBS MODERATE 35: CPT | Performed by: STUDENT IN AN ORGANIZED HEALTH CARE EDUCATION/TRAINING PROGRAM

## 2024-03-30 PROCEDURE — 94669 MECHANICAL CHEST WALL OSCILL: CPT

## 2024-03-30 RX ORDER — METHYLPREDNISOLONE SODIUM SUCCINATE 40 MG/ML
40 INJECTION, POWDER, LYOPHILIZED, FOR SOLUTION INTRAMUSCULAR; INTRAVENOUS ONCE
Status: COMPLETED | OUTPATIENT
Start: 2024-03-30 | End: 2024-03-30

## 2024-03-30 RX ADMIN — IPRATROPIUM BROMIDE AND ALBUTEROL SULFATE 1 DOSE: 2.5; .5 SOLUTION RESPIRATORY (INHALATION) at 15:41

## 2024-03-30 RX ADMIN — BUDESONIDE INHALATION 500 MCG: 0.5 SUSPENSION RESPIRATORY (INHALATION) at 08:45

## 2024-03-30 RX ADMIN — ARFORMOTEROL TARTRATE 15 MCG: 15 SOLUTION RESPIRATORY (INHALATION) at 19:37

## 2024-03-30 RX ADMIN — SODIUM CHLORIDE, PRESERVATIVE FREE 10 ML: 5 INJECTION INTRAVENOUS at 22:09

## 2024-03-30 RX ADMIN — BUDESONIDE INHALATION 500 MCG: 0.5 SUSPENSION RESPIRATORY (INHALATION) at 19:37

## 2024-03-30 RX ADMIN — CEFEPIME 2000 MG: 2 INJECTION, POWDER, FOR SOLUTION INTRAVENOUS at 03:28

## 2024-03-30 RX ADMIN — IPRATROPIUM BROMIDE AND ALBUTEROL SULFATE 1 DOSE: 2.5; .5 SOLUTION RESPIRATORY (INHALATION) at 19:37

## 2024-03-30 RX ADMIN — IPRATROPIUM BROMIDE AND ALBUTEROL SULFATE 1 DOSE: 2.5; .5 SOLUTION RESPIRATORY (INHALATION) at 12:03

## 2024-03-30 RX ADMIN — IPRATROPIUM BROMIDE AND ALBUTEROL SULFATE 1 DOSE: 2.5; .5 SOLUTION RESPIRATORY (INHALATION) at 03:06

## 2024-03-30 RX ADMIN — AZITHROMYCIN 250 MG: 250 TABLET, FILM COATED ORAL at 11:14

## 2024-03-30 RX ADMIN — SODIUM CHLORIDE, PRESERVATIVE FREE 10 ML: 5 INJECTION INTRAVENOUS at 11:18

## 2024-03-30 RX ADMIN — METHYLPREDNISOLONE SODIUM SUCCINATE 40 MG: 40 INJECTION INTRAMUSCULAR; INTRAVENOUS at 11:16

## 2024-03-30 RX ADMIN — IPRATROPIUM BROMIDE AND ALBUTEROL SULFATE 1 DOSE: 2.5; .5 SOLUTION RESPIRATORY (INHALATION) at 08:44

## 2024-03-30 RX ADMIN — CEFEPIME 2000 MG: 2 INJECTION, POWDER, FOR SOLUTION INTRAVENOUS at 14:23

## 2024-03-30 RX ADMIN — ARFORMOTEROL TARTRATE 15 MCG: 15 SOLUTION RESPIRATORY (INHALATION) at 08:46

## 2024-03-30 RX ADMIN — ENOXAPARIN SODIUM 40 MG: 100 INJECTION SUBCUTANEOUS at 11:15

## 2024-03-30 RX ADMIN — IPRATROPIUM BROMIDE AND ALBUTEROL SULFATE 1 DOSE: 2.5; .5 SOLUTION RESPIRATORY (INHALATION) at 00:01

## 2024-03-30 ASSESSMENT — PAIN SCALES - GENERAL: PAINLEVEL_OUTOF10: 0

## 2024-03-30 NOTE — PROGRESS NOTES
[facility] Hospitalist Progress Note    Admitting Date and Time: 3/28/2024 11:38 AM  Admit Dx: COPD exacerbation (HCC) [J44.1]  Pneumonia of left lower lobe due to infectious organism [J18.9]  Acute hypoxic respiratory failure (HCC) [J96.01]    Subjective:  Patient is being followed for COPD exacerbation (HCC) [J44.1]  Pneumonia of left lower lobe due to infectious organism [J18.9]  Acute hypoxic respiratory failure (HCC) [J96.01]   Pt feels okay  Per RN: no additional concerns    ROS: denies fever, chills, cp, sob, n/v, HA unless stated above.      cefepime  2,000 mg IntraVENous Q12H    azithromycin  250 mg Oral Daily    ipratropium 0.5 mg-albuterol 2.5 mg  1 Dose Inhalation Q4H RT    bumetanide  1 mg Oral Once per day on Mon Wed Fri    sodium chloride flush  5-40 mL IntraVENous 2 times per day    enoxaparin  40 mg SubCUTAneous Daily    budesonide  500 mcg Nebulization BID RT    arformoterol tartrate  15 mcg Nebulization BID RT     oxyCODONE-acetaminophen, 1 tablet, BID PRN   And  oxyCODONE, 2.5 mg, BID PRN  sodium chloride flush, 5-40 mL, PRN  sodium chloride, , PRN  potassium chloride, 40 mEq, PRN   Or  potassium alternative oral replacement, 40 mEq, PRN   Or  potassium chloride, 10 mEq, PRN  magnesium sulfate, 2,000 mg, PRN  ondansetron, 4 mg, Q8H PRN   Or  ondansetron, 4 mg, Q6H PRN  polyethylene glycol, 17 g, Daily PRN  acetaminophen, 650 mg, Q6H PRN   Or  acetaminophen, 650 mg, Q6H PRN  melatonin, 3 mg, Nightly PRN  ipratropium 0.5 mg-albuterol 2.5 mg, 1 Dose, Q4H PRN         Objective:  /74   Pulse 83   Temp 97.4 °F (36.3 °C) (Oral)   Resp 18   Ht 1.829 m (6')   Wt 84.4 kg (186 lb)   SpO2 96%   BMI 25.23 kg/m²     General Appearance: alert and oriented to person, place and time and in NAD, sitting in bed reading newspaper  Skin: warm and dry  Head: normocephalic and atraumatic  Eyes: PERRL, EOMI, conjunctivae normal  Neck: neck supple, trachea midline   Pulmonary/Chest: no respiratory

## 2024-03-30 NOTE — PROGRESS NOTES
[facility]  [address], [address]  [place] [address]      Pulmonary Progress Note      SUBJECTIVE:  claims feeling some better with breathing, still cough and mod sputum production, on 15 li HFNC, tolerating nebs and chest vest, sitting up in bed eating lunch.    OBJECTIVE    Medications    Continuous Infusions:   sodium chloride         Scheduled Meds:   cefepime  2,000 mg IntraVENous Q12H    azithromycin  250 mg Oral Daily    ipratropium 0.5 mg-albuterol 2.5 mg  1 Dose Inhalation Q4H RT    bumetanide  1 mg Oral Once per day on     sodium chloride flush  5-40 mL IntraVENous 2 times per day    enoxaparin  40 mg SubCUTAneous Daily    budesonide  500 mcg Nebulization BID RT    arformoterol tartrate  15 mcg Nebulization BID RT       PRN Meds:oxyCODONE-acetaminophen **AND** oxyCODONE, sodium chloride flush, sodium chloride, potassium chloride **OR** potassium alternative oral replacement **OR** potassium chloride, magnesium sulfate, ondansetron **OR** ondansetron, polyethylene glycol, acetaminophen **OR** acetaminophen, melatonin, ipratropium 0.5 mg-albuterol 2.5 mg    Physical    VITALS:  /74   Pulse 83   Temp 97.4 °F (36.3 °C) (Oral)   Resp 18   Ht 1.829 m (6')   Wt 84.4 kg (186 lb)   SpO2 96%   BMI 25.23 kg/m²     24HR INTAKE/OUTPUT:      Intake/Output Summary (Last 24 hours) at 3/30/2024 1159  Last data filed at 3/30/2024 1118  Gross per 24 hour   Intake 1095.38 ml   Output 3900 ml   Net -2804.62 ml       24HR PULSE OXIMETRY RANGE:    SpO2  Av.8 %  Min: 94 %  Max: 98 %    General appearance: alert, appears stated age, and cooperative  Lungs: ronchi bilateral  Heart: regular rate and rhythm, S1, S2 normal, no murmur, click, rub or gallop  Abdomen: soft, non-tender; bowel sounds normal; no masses,  no organomegaly  Extremities: extremities normal, atraumatic, no cyanosis or edema  Neurologic: Mental status: Alert,

## 2024-03-30 NOTE — CARE COORDINATION
IMM delivered,copy given to patient, signed copy in soft chart.    Yari SUAREZN, RN  [facility]

## 2024-03-30 NOTE — PROGRESS NOTES
Patient states he is less SOB, only SOB upon ambulating a small distance. A&Ox4. No c/o presently.

## 2024-03-30 NOTE — PLAN OF CARE
Problem: ABCDS Injury Assessment  Goal: Absence of physical injury  3/29/2024 2133 by Isatu Landry RN  Outcome: Progressing  3/29/2024 1000 by Estephanie Cardoza RN  Outcome: Progressing     Problem: Discharge Planning  Goal: Discharge to home or other facility with appropriate resources  3/29/2024 2133 by Isatu Landry RN  Outcome: Progressing  3/29/2024 1000 by Estephanie Cardoza RN  Outcome: Progressing     Problem: Safety - Adult  Goal: Free from fall injury  3/29/2024 2133 by Isatu Landry RN  Outcome: Progressing  3/29/2024 1000 by Estephanie Cardoza RN  Outcome: Progressing     Problem: Skin/Tissue Integrity  Goal: Absence of new skin breakdown  Description: 1.  Monitor for areas of redness and/or skin breakdown  2.  Assess vascular access sites hourly  3.  Every 4-6 hours minimum:  Change oxygen saturation probe site  4.  Every 4-6 hours:  If on nasal continuous positive airway pressure, respiratory therapy assess nares and determine need for appliance change or resting period.  Outcome: Progressing

## 2024-03-31 LAB
ANION GAP SERPL CALCULATED.3IONS-SCNC: 10 MMOL/L (ref 7–16)
BASOPHILS # BLD: 0.01 K/UL (ref 0–0.2)
BASOPHILS NFR BLD: 0 % (ref 0–2)
BUN SERPL-MCNC: 11 MG/DL (ref 6–23)
CALCIUM SERPL-MCNC: 9.6 MG/DL (ref 8.6–10.2)
CHLORIDE SERPL-SCNC: 104 MMOL/L (ref 98–107)
CO2 SERPL-SCNC: 25 MMOL/L (ref 22–29)
CREAT SERPL-MCNC: 0.6 MG/DL (ref 0.7–1.2)
EOSINOPHIL # BLD: 0.03 K/UL (ref 0.05–0.5)
EOSINOPHILS RELATIVE PERCENT: 0 % (ref 0–6)
ERYTHROCYTE [DISTWIDTH] IN BLOOD BY AUTOMATED COUNT: 15.2 % (ref 11.5–15)
GFR SERPL CREATININE-BSD FRML MDRD: >90 ML/MIN/1.73M2
GLUCOSE SERPL-MCNC: 96 MG/DL (ref 74–99)
HCT VFR BLD AUTO: 48.8 % (ref 37–54)
HGB BLD-MCNC: 16.1 G/DL (ref 12.5–16.5)
IMM GRANULOCYTES # BLD AUTO: <0.03 K/UL (ref 0–0.58)
IMM GRANULOCYTES NFR BLD: 0 % (ref 0–5)
LYMPHOCYTES NFR BLD: 0.85 K/UL (ref 1.5–4)
LYMPHOCYTES RELATIVE PERCENT: 11 % (ref 20–42)
MCH RBC QN AUTO: 31.4 PG (ref 26–35)
MCHC RBC AUTO-ENTMCNC: 33 G/DL (ref 32–34.5)
MCV RBC AUTO: 95.3 FL (ref 80–99.9)
MICROORGANISM SPEC CULT: ABNORMAL
MICROORGANISM/AGENT SPEC: ABNORMAL
MONOCYTES NFR BLD: 0.5 K/UL (ref 0.1–0.95)
MONOCYTES NFR BLD: 7 % (ref 2–12)
NEUTROPHILS NFR BLD: 81 % (ref 43–80)
NEUTS SEG NFR BLD: 6.09 K/UL (ref 1.8–7.3)
PLATELET # BLD AUTO: 213 K/UL (ref 130–450)
PMV BLD AUTO: 10 FL (ref 7–12)
POTASSIUM SERPL-SCNC: 3.9 MMOL/L (ref 3.5–5)
RBC # BLD AUTO: 5.12 M/UL (ref 3.8–5.8)
SODIUM SERPL-SCNC: 139 MMOL/L (ref 132–146)
SPECIMEN DESCRIPTION: ABNORMAL
TROPONIN I SERPL HS-MCNC: 16 NG/L (ref 0–11)
WBC OTHER # BLD: 7.5 K/UL (ref 4.5–11.5)

## 2024-03-31 PROCEDURE — 6360000002 HC RX W HCPCS: Performed by: STUDENT IN AN ORGANIZED HEALTH CARE EDUCATION/TRAINING PROGRAM

## 2024-03-31 PROCEDURE — 94660 CPAP INITIATION&MGMT: CPT

## 2024-03-31 PROCEDURE — 2700000000 HC OXYGEN THERAPY PER DAY

## 2024-03-31 PROCEDURE — 6370000000 HC RX 637 (ALT 250 FOR IP): Performed by: NURSE PRACTITIONER

## 2024-03-31 PROCEDURE — 93005 ELECTROCARDIOGRAM TRACING: CPT | Performed by: STUDENT IN AN ORGANIZED HEALTH CARE EDUCATION/TRAINING PROGRAM

## 2024-03-31 PROCEDURE — 2580000003 HC RX 258: Performed by: INTERNAL MEDICINE

## 2024-03-31 PROCEDURE — 99232 SBSQ HOSP IP/OBS MODERATE 35: CPT | Performed by: STUDENT IN AN ORGANIZED HEALTH CARE EDUCATION/TRAINING PROGRAM

## 2024-03-31 PROCEDURE — 6370000000 HC RX 637 (ALT 250 FOR IP): Performed by: INTERNAL MEDICINE

## 2024-03-31 PROCEDURE — 6370000000 HC RX 637 (ALT 250 FOR IP): Performed by: STUDENT IN AN ORGANIZED HEALTH CARE EDUCATION/TRAINING PROGRAM

## 2024-03-31 PROCEDURE — 94668 MNPJ CHEST WALL SBSQ: CPT

## 2024-03-31 PROCEDURE — 85025 COMPLETE CBC W/AUTO DIFF WBC: CPT

## 2024-03-31 PROCEDURE — 94669 MECHANICAL CHEST WALL OSCILL: CPT

## 2024-03-31 PROCEDURE — 80048 BASIC METABOLIC PNL TOTAL CA: CPT

## 2024-03-31 PROCEDURE — 2580000003 HC RX 258: Performed by: STUDENT IN AN ORGANIZED HEALTH CARE EDUCATION/TRAINING PROGRAM

## 2024-03-31 PROCEDURE — 84484 ASSAY OF TROPONIN QUANT: CPT

## 2024-03-31 PROCEDURE — 6360000002 HC RX W HCPCS: Performed by: INTERNAL MEDICINE

## 2024-03-31 PROCEDURE — 94640 AIRWAY INHALATION TREATMENT: CPT

## 2024-03-31 PROCEDURE — 2060000000 HC ICU INTERMEDIATE R&B

## 2024-03-31 RX ORDER — FAMOTIDINE 20 MG/1
20 TABLET, FILM COATED ORAL ONCE
Status: COMPLETED | OUTPATIENT
Start: 2024-03-31 | End: 2024-03-31

## 2024-03-31 RX ADMIN — SODIUM CHLORIDE, PRESERVATIVE FREE 10 ML: 5 INJECTION INTRAVENOUS at 09:14

## 2024-03-31 RX ADMIN — SODIUM CHLORIDE, PRESERVATIVE FREE 10 ML: 5 INJECTION INTRAVENOUS at 21:13

## 2024-03-31 RX ADMIN — CEFEPIME 2000 MG: 2 INJECTION, POWDER, FOR SOLUTION INTRAVENOUS at 14:54

## 2024-03-31 RX ADMIN — OXYCODONE 2.5 MG: 5 TABLET ORAL at 21:09

## 2024-03-31 RX ADMIN — IPRATROPIUM BROMIDE AND ALBUTEROL SULFATE 1 DOSE: 2.5; .5 SOLUTION RESPIRATORY (INHALATION) at 12:59

## 2024-03-31 RX ADMIN — IPRATROPIUM BROMIDE AND ALBUTEROL SULFATE 1 DOSE: 2.5; .5 SOLUTION RESPIRATORY (INHALATION) at 16:42

## 2024-03-31 RX ADMIN — ENOXAPARIN SODIUM 40 MG: 100 INJECTION SUBCUTANEOUS at 09:04

## 2024-03-31 RX ADMIN — CEFEPIME 2000 MG: 2 INJECTION, POWDER, FOR SOLUTION INTRAVENOUS at 03:15

## 2024-03-31 RX ADMIN — SODIUM CHLORIDE, PRESERVATIVE FREE 10 ML: 5 INJECTION INTRAVENOUS at 09:51

## 2024-03-31 RX ADMIN — BUDESONIDE INHALATION 500 MCG: 0.5 SUSPENSION RESPIRATORY (INHALATION) at 19:43

## 2024-03-31 RX ADMIN — IPRATROPIUM BROMIDE AND ALBUTEROL SULFATE 1 DOSE: 2.5; .5 SOLUTION RESPIRATORY (INHALATION) at 04:50

## 2024-03-31 RX ADMIN — IPRATROPIUM BROMIDE AND ALBUTEROL SULFATE 1 DOSE: 2.5; .5 SOLUTION RESPIRATORY (INHALATION) at 19:43

## 2024-03-31 RX ADMIN — FAMOTIDINE 20 MG: 20 TABLET ORAL at 16:05

## 2024-03-31 RX ADMIN — IPRATROPIUM BROMIDE AND ALBUTEROL SULFATE 1 DOSE: 2.5; .5 SOLUTION RESPIRATORY (INHALATION) at 09:57

## 2024-03-31 RX ADMIN — AZITHROMYCIN 250 MG: 250 TABLET, FILM COATED ORAL at 09:04

## 2024-03-31 RX ADMIN — IPRATROPIUM BROMIDE AND ALBUTEROL SULFATE 1 DOSE: 2.5; .5 SOLUTION RESPIRATORY (INHALATION) at 00:12

## 2024-03-31 RX ADMIN — BUDESONIDE INHALATION 500 MCG: 0.5 SUSPENSION RESPIRATORY (INHALATION) at 09:57

## 2024-03-31 RX ADMIN — IPRATROPIUM BROMIDE AND ALBUTEROL SULFATE 1 DOSE: 2.5; .5 SOLUTION RESPIRATORY (INHALATION) at 23:41

## 2024-03-31 RX ADMIN — ARFORMOTEROL TARTRATE 15 MCG: 15 SOLUTION RESPIRATORY (INHALATION) at 09:57

## 2024-03-31 RX ADMIN — OXYCODONE AND ACETAMINOPHEN 1 TABLET: 5; 325 TABLET ORAL at 21:08

## 2024-03-31 RX ADMIN — ARFORMOTEROL TARTRATE 15 MCG: 15 SOLUTION RESPIRATORY (INHALATION) at 19:43

## 2024-03-31 ASSESSMENT — PAIN SCALES - GENERAL
PAINLEVEL_OUTOF10: 7
PAINLEVEL_OUTOF10: 4
PAINLEVEL_OUTOF10: 4

## 2024-03-31 ASSESSMENT — PAIN DESCRIPTION - DESCRIPTORS: DESCRIPTORS: ACHING;DISCOMFORT;THROBBING

## 2024-03-31 ASSESSMENT — PAIN DESCRIPTION - LOCATION: LOCATION: BACK;HIP

## 2024-03-31 NOTE — PROGRESS NOTES
[facility]  [address], [address]  [place] [address]      Pulmonary Progress Note      SUBJECTIVE:  claims feeling some better with breathing, still cough and mod sputum production, down to 11 li HFNC today, tolerating nebs and chest vest tid, sitting up in bed.    OBJECTIVE    Medications    Continuous Infusions:   sodium chloride         Scheduled Meds:   cefepime  2,000 mg IntraVENous Q12H    azithromycin  250 mg Oral Daily    ipratropium 0.5 mg-albuterol 2.5 mg  1 Dose Inhalation Q4H RT    bumetanide  1 mg Oral Once per day on     sodium chloride flush  5-40 mL IntraVENous 2 times per day    enoxaparin  40 mg SubCUTAneous Daily    budesonide  500 mcg Nebulization BID RT    arformoterol tartrate  15 mcg Nebulization BID RT       PRN Meds:oxyCODONE-acetaminophen **AND** oxyCODONE, sodium chloride flush, sodium chloride, potassium chloride **OR** potassium alternative oral replacement **OR** potassium chloride, magnesium sulfate, ondansetron **OR** ondansetron, polyethylene glycol, acetaminophen **OR** acetaminophen, melatonin, ipratropium 0.5 mg-albuterol 2.5 mg    Physical    VITALS:  /77   Pulse 94   Temp 98.2 °F (36.8 °C) (Oral)   Resp 18   Ht 1.829 m (6')   Wt 80.4 kg (177 lb 4.8 oz)   SpO2 93%   BMI 24.05 kg/m²     24HR INTAKE/OUTPUT:      Intake/Output Summary (Last 24 hours) at 3/31/2024 1208  Last data filed at 3/31/2024 0914  Gross per 24 hour   Intake 10 ml   Output 500 ml   Net -490 ml         24HR PULSE OXIMETRY RANGE:    SpO2  Av.9 %  Min: 90 %  Max: 98 %    General appearance: alert, appears stated age, and cooperative  Lungs: ronchi bilateral  Heart: regular rate and rhythm, S1, S2 normal, no murmur, click, rub or gallop  Abdomen: soft, non-tender; bowel sounds normal; no masses,  no organomegaly  Extremities: extremities normal, atraumatic, no cyanosis or edema  Neurologic: Mental status: Alert,  oriented, thought content appropriate    Data    CBC:   Recent Labs     03/29/24  0944 03/30/24  0343 03/31/24  0320   WBC 7.4 8.5 7.5   HGB 14.5 15.7 16.1   HCT 44.2 47.6 48.8   MCV 93.8 94.3 95.3    228 213         BMP:  Recent Labs     03/29/24  0944 03/30/24  0343 03/31/24  0320    137 139   K 4.0 3.9 3.9    100 104   CO2 26 26 25   BUN 9 11 11   CREATININE 0.6* 0.7 0.6*      ALB:3,BILIDIR:3,BILITOT:3,ALKPHOS:3)@    PT/INR:   No results for input(s): \"PROTIME\", \"INR\" in the last 72 hours.      ABG:   No results for input(s): \"PH\", \"PO2\", \"PCO2\", \"HCO3\", \"BE\", \"O2SAT\", \"METHB\", \"O2HB\", \"COHB\", \"O2CON\", \"HHB\", \"THB\" in the last 72 hours.  FiO2 : 50 %       Radiology/Other tests reviewed: none    Assessment:     Principal Problem:    COPD exacerbation (HCC)  Active Problems:    COPD (chronic obstructive pulmonary disease) (HCC)    Chronic respiratory failure with hypoxia (HCC)    Pulmonary hypertension (HCC)    Chronic diastolic heart failure (HCC)    Acute on chronic respiratory failure with hypoxia (HCC)    IgG deficiency (HCC)    Pneumonia of left lower lobe due to infectious organism  Resolved Problems:    * No resolved hospital problems. *      Plan:       Cont with nebs, observe respiratory function  Cont with antibiotics, await sputum culture  Cont with chest vest, observe cough/sputum production  Cont with oxygen, taper as tolerated  OOB to chair, ambulate as tolerated      Time at the bedside, reviewing labs and radiographs, reviewing notes and consultations, discussing with staff and family was more than 50 minutes.      Thanks for letting us see this patient in consultation.  Please contact us with any questions. Office [phone] or after hours through [employer], x [phone].

## 2024-03-31 NOTE — PROGRESS NOTES
Upon entering patients room to take VS, pt states he got up to the BR 20 minutes ago and has had chest pressure and pain. Pt points to substernal area that does not increase with deep breaths. Denies nausea, sweating, or other c/o. Pt states he has had the pressure \"a little\" since admission but not pain. Dr Christine notified and will be here to see pt.

## 2024-03-31 NOTE — PROGRESS NOTES
[facility] Hospitalist Progress Note    Admitting Date and Time: 3/28/2024 11:38 AM  Admit Dx: COPD exacerbation (HCC) [J44.1]  Pneumonia of left lower lobe due to infectious organism [J18.9]  Acute hypoxic respiratory failure (HCC) [J96.01]    Subjective:  Patient is being followed for COPD exacerbation (HCC) [J44.1]  Pneumonia of left lower lobe due to infectious organism [J18.9]  Acute hypoxic respiratory failure (HCC) [J96.01]   Pt feels okay  Per RN: no additional concerns    ROS: denies fever, chills, cp, sob, n/v, HA unless stated above.      cefepime  2,000 mg IntraVENous Q12H    azithromycin  250 mg Oral Daily    ipratropium 0.5 mg-albuterol 2.5 mg  1 Dose Inhalation Q4H RT    bumetanide  1 mg Oral Once per day on Mon Wed Fri    sodium chloride flush  5-40 mL IntraVENous 2 times per day    enoxaparin  40 mg SubCUTAneous Daily    budesonide  500 mcg Nebulization BID RT    arformoterol tartrate  15 mcg Nebulization BID RT     oxyCODONE-acetaminophen, 1 tablet, BID PRN   And  oxyCODONE, 2.5 mg, BID PRN  sodium chloride flush, 5-40 mL, PRN  sodium chloride, , PRN  potassium chloride, 40 mEq, PRN   Or  potassium alternative oral replacement, 40 mEq, PRN   Or  potassium chloride, 10 mEq, PRN  magnesium sulfate, 2,000 mg, PRN  ondansetron, 4 mg, Q8H PRN   Or  ondansetron, 4 mg, Q6H PRN  polyethylene glycol, 17 g, Daily PRN  acetaminophen, 650 mg, Q6H PRN   Or  acetaminophen, 650 mg, Q6H PRN  melatonin, 3 mg, Nightly PRN  ipratropium 0.5 mg-albuterol 2.5 mg, 1 Dose, Q4H PRN         Objective:  /77   Pulse 94   Temp 98.2 °F (36.8 °C) (Oral)   Resp 18   Ht 1.829 m (6')   Wt 80.4 kg (177 lb 4.8 oz)   SpO2 93%   BMI 24.05 kg/m²     General Appearance: alert and oriented to person, place and time and in NAD, sitting in bed reading  Skin: warm and dry  Head: normocephalic and atraumatic  Eyes: PERRL, EOMI, conjunctivae normal  Neck: neck supple, trachea midline   Pulmonary/Chest: no respiratory  c/s. Abx as above, cont azithro for anti-inflammatory properties  Elevated proBNP- proBNP 3008, highest prior was ~3000 in 2022, no edema or crackles on exam, no pulm edema on imaging. Normally prescribed bumex 1mg MWF however stopped taking about 1 week ago, will cont for now  Orthostatic Hypotension- hold midodrine for now, BP stable at this time  Hx Tobacco Use- Cessation 2016, cont cessation    Today's exam/findings/plan  -cont abx for now, include Pseudomonas coverage, adjust as able  -O2 reqs improving and cont to wean, will d/c steroids for now, consider resuming if O2 reqs increase  -Pulm c/s, cont chest vest    NOTE: Portions of this report may have been transcribed using voice recognition software. Every effort was made to ensure accuracy; however, inadvertent computerized transcription errors may be present.  Electronically signed by Glenys Christine MD on 3/31/2024 at 12:46 PM

## 2024-04-01 ENCOUNTER — APPOINTMENT (OUTPATIENT)
Dept: GENERAL RADIOLOGY | Age: 74
DRG: 193 | End: 2024-04-01
Payer: MEDICARE

## 2024-04-01 LAB
ANION GAP SERPL CALCULATED.3IONS-SCNC: 9 MMOL/L (ref 7–16)
BASOPHILS # BLD: 0.05 K/UL (ref 0–0.2)
BASOPHILS NFR BLD: 1 % (ref 0–2)
BUN SERPL-MCNC: 14 MG/DL (ref 6–23)
CALCIUM SERPL-MCNC: 9.4 MG/DL (ref 8.6–10.2)
CHLORIDE SERPL-SCNC: 105 MMOL/L (ref 98–107)
CO2 SERPL-SCNC: 23 MMOL/L (ref 22–29)
CREAT SERPL-MCNC: 0.6 MG/DL (ref 0.7–1.2)
EKG ATRIAL RATE: 76 BPM
EKG P AXIS: 58 DEGREES
EKG P-R INTERVAL: 160 MS
EKG Q-T INTERVAL: 396 MS
EKG QRS DURATION: 102 MS
EKG QTC CALCULATION (BAZETT): 445 MS
EKG R AXIS: 47 DEGREES
EKG T AXIS: 21 DEGREES
EKG VENTRICULAR RATE: 76 BPM
EOSINOPHIL # BLD: 0.33 K/UL (ref 0.05–0.5)
EOSINOPHILS RELATIVE PERCENT: 6 % (ref 0–6)
ERYTHROCYTE [DISTWIDTH] IN BLOOD BY AUTOMATED COUNT: 15.6 % (ref 11.5–15)
GFR SERPL CREATININE-BSD FRML MDRD: >90 ML/MIN/1.73M2
GLUCOSE SERPL-MCNC: 82 MG/DL (ref 74–99)
HCT VFR BLD AUTO: 50.7 % (ref 37–54)
HGB BLD-MCNC: 16.4 G/DL (ref 12.5–16.5)
IMM GRANULOCYTES # BLD AUTO: <0.03 K/UL (ref 0–0.58)
IMM GRANULOCYTES NFR BLD: 0 % (ref 0–5)
LYMPHOCYTES NFR BLD: 1.05 K/UL (ref 1.5–4)
LYMPHOCYTES RELATIVE PERCENT: 19 % (ref 20–42)
MCH RBC QN AUTO: 31 PG (ref 26–35)
MCHC RBC AUTO-ENTMCNC: 32.3 G/DL (ref 32–34.5)
MCV RBC AUTO: 95.8 FL (ref 80–99.9)
MONOCYTES NFR BLD: 0.45 K/UL (ref 0.1–0.95)
MONOCYTES NFR BLD: 8 % (ref 2–12)
NEUTROPHILS NFR BLD: 66 % (ref 43–80)
NEUTS SEG NFR BLD: 3.72 K/UL (ref 1.8–7.3)
PLATELET # BLD AUTO: 190 K/UL (ref 130–450)
PMV BLD AUTO: 10 FL (ref 7–12)
POTASSIUM SERPL-SCNC: 4.3 MMOL/L (ref 3.5–5)
RBC # BLD AUTO: 5.29 M/UL (ref 3.8–5.8)
SODIUM SERPL-SCNC: 137 MMOL/L (ref 132–146)
WBC OTHER # BLD: 5.6 K/UL (ref 4.5–11.5)

## 2024-04-01 PROCEDURE — 6370000000 HC RX 637 (ALT 250 FOR IP): Performed by: NURSE PRACTITIONER

## 2024-04-01 PROCEDURE — 2580000003 HC RX 258: Performed by: INTERNAL MEDICINE

## 2024-04-01 PROCEDURE — 93010 ELECTROCARDIOGRAM REPORT: CPT | Performed by: INTERNAL MEDICINE

## 2024-04-01 PROCEDURE — 6370000000 HC RX 637 (ALT 250 FOR IP): Performed by: INTERNAL MEDICINE

## 2024-04-01 PROCEDURE — 71046 X-RAY EXAM CHEST 2 VIEWS: CPT

## 2024-04-01 PROCEDURE — 94640 AIRWAY INHALATION TREATMENT: CPT

## 2024-04-01 PROCEDURE — 94668 MNPJ CHEST WALL SBSQ: CPT

## 2024-04-01 PROCEDURE — 99232 SBSQ HOSP IP/OBS MODERATE 35: CPT | Performed by: STUDENT IN AN ORGANIZED HEALTH CARE EDUCATION/TRAINING PROGRAM

## 2024-04-01 PROCEDURE — 6360000002 HC RX W HCPCS: Performed by: INTERNAL MEDICINE

## 2024-04-01 PROCEDURE — 36415 COLL VENOUS BLD VENIPUNCTURE: CPT

## 2024-04-01 PROCEDURE — 94660 CPAP INITIATION&MGMT: CPT

## 2024-04-01 PROCEDURE — 85025 COMPLETE CBC W/AUTO DIFF WBC: CPT

## 2024-04-01 PROCEDURE — 6370000000 HC RX 637 (ALT 250 FOR IP): Performed by: STUDENT IN AN ORGANIZED HEALTH CARE EDUCATION/TRAINING PROGRAM

## 2024-04-01 PROCEDURE — 80048 BASIC METABOLIC PNL TOTAL CA: CPT

## 2024-04-01 PROCEDURE — 6360000002 HC RX W HCPCS: Performed by: STUDENT IN AN ORGANIZED HEALTH CARE EDUCATION/TRAINING PROGRAM

## 2024-04-01 PROCEDURE — 2700000000 HC OXYGEN THERAPY PER DAY

## 2024-04-01 PROCEDURE — 2060000000 HC ICU INTERMEDIATE R&B

## 2024-04-01 PROCEDURE — 94669 MECHANICAL CHEST WALL OSCILL: CPT

## 2024-04-01 PROCEDURE — 2580000003 HC RX 258: Performed by: STUDENT IN AN ORGANIZED HEALTH CARE EDUCATION/TRAINING PROGRAM

## 2024-04-01 RX ADMIN — ARFORMOTEROL TARTRATE 15 MCG: 15 SOLUTION RESPIRATORY (INHALATION) at 08:17

## 2024-04-01 RX ADMIN — ENOXAPARIN SODIUM 40 MG: 100 INJECTION SUBCUTANEOUS at 09:28

## 2024-04-01 RX ADMIN — AZITHROMYCIN 250 MG: 250 TABLET, FILM COATED ORAL at 09:28

## 2024-04-01 RX ADMIN — BUMETANIDE 1 MG: 1 TABLET ORAL at 19:37

## 2024-04-01 RX ADMIN — CEFEPIME 2000 MG: 2 INJECTION, POWDER, FOR SOLUTION INTRAVENOUS at 04:15

## 2024-04-01 RX ADMIN — SODIUM CHLORIDE, PRESERVATIVE FREE 10 ML: 5 INJECTION INTRAVENOUS at 19:37

## 2024-04-01 RX ADMIN — IPRATROPIUM BROMIDE AND ALBUTEROL SULFATE 1 DOSE: 2.5; .5 SOLUTION RESPIRATORY (INHALATION) at 08:17

## 2024-04-01 RX ADMIN — OXYCODONE AND ACETAMINOPHEN 1 TABLET: 5; 325 TABLET ORAL at 10:26

## 2024-04-01 RX ADMIN — ARFORMOTEROL TARTRATE 15 MCG: 15 SOLUTION RESPIRATORY (INHALATION) at 18:35

## 2024-04-01 RX ADMIN — IPRATROPIUM BROMIDE AND ALBUTEROL SULFATE 1 DOSE: 2.5; .5 SOLUTION RESPIRATORY (INHALATION) at 22:28

## 2024-04-01 RX ADMIN — SODIUM CHLORIDE, PRESERVATIVE FREE 5 ML: 5 INJECTION INTRAVENOUS at 09:47

## 2024-04-01 RX ADMIN — IPRATROPIUM BROMIDE AND ALBUTEROL SULFATE 1 DOSE: 2.5; .5 SOLUTION RESPIRATORY (INHALATION) at 11:52

## 2024-04-01 RX ADMIN — CEFEPIME 2000 MG: 2 INJECTION, POWDER, FOR SOLUTION INTRAVENOUS at 13:18

## 2024-04-01 RX ADMIN — BUDESONIDE INHALATION 500 MCG: 0.5 SUSPENSION RESPIRATORY (INHALATION) at 08:18

## 2024-04-01 RX ADMIN — BUDESONIDE INHALATION 500 MCG: 0.5 SUSPENSION RESPIRATORY (INHALATION) at 18:35

## 2024-04-01 RX ADMIN — IPRATROPIUM BROMIDE AND ALBUTEROL SULFATE 1 DOSE: 2.5; .5 SOLUTION RESPIRATORY (INHALATION) at 04:23

## 2024-04-01 RX ADMIN — IPRATROPIUM BROMIDE AND ALBUTEROL SULFATE 1 DOSE: 2.5; .5 SOLUTION RESPIRATORY (INHALATION) at 18:35

## 2024-04-01 RX ADMIN — IPRATROPIUM BROMIDE AND ALBUTEROL SULFATE 1 DOSE: 2.5; .5 SOLUTION RESPIRATORY (INHALATION) at 14:51

## 2024-04-01 ASSESSMENT — PAIN SCALES - GENERAL: PAINLEVEL_OUTOF10: 2

## 2024-04-01 ASSESSMENT — PAIN DESCRIPTION - ORIENTATION: ORIENTATION: LEFT

## 2024-04-01 ASSESSMENT — PAIN DESCRIPTION - LOCATION: LOCATION: HIP;BACK;LEG

## 2024-04-01 NOTE — PROGRESS NOTES
Pulmonary Progress Note    Admit Date: 3/28/2024  Hospital day                               PCP: Juni Loyd MD    Chief Complaint (s):  Patient Active Problem List   Diagnosis    COPD (chronic obstructive pulmonary disease) (HCC)    Thoracic ascending aortic aneurysm    S/P lumbar fusion    Hilar adenopathy    Pulmonary Mycobacterium avium complex (MAC) infection (HCC)    Chronic respiratory failure with hypoxia (HCC)    Chronic pain syndrome    Lumbar degenerative disc disease    Chronic low back pain    Immunocompromised (HCC)    Iron deficiency    Pulmonary hypertension (HCC)    Chronic diastolic heart failure (HCC)    Acute on chronic respiratory failure with hypoxia (HCC)    IgG deficiency (HCC)    COPD exacerbation (HCC)    Pneumonia of left lower lobe due to infectious organism       Subjective:  Seen in follow-up, feeling a little bit better.  Steroids stopped per primary service.  Antipseudomonal's continue.      Vitals:  VITALS:  BP (!) 112/58   Pulse 77   Temp 97.1 °F (36.2 °C) (Axillary)   Resp 18   Ht 1.829 m (6')   Wt 80.4 kg (177 lb 4.8 oz)   SpO2 93%   BMI 24.05 kg/m²     24HR INTAKE/OUTPUT:      Intake/Output Summary (Last 24 hours) at 2024 1425  Last data filed at 2024 0933  Gross per 24 hour   Intake 180 ml   Output 750 ml   Net -570 ml       24HR PULSE OXIMETRY RANGE:    SpO2  Av.2 %  Min: 92 %  Max: 96 %    Medications:  IV:   sodium chloride         Scheduled Meds:   cefepime  2,000 mg IntraVENous Q12H    azithromycin  250 mg Oral Daily    ipratropium 0.5 mg-albuterol 2.5 mg  1 Dose Inhalation Q4H RT    bumetanide  1 mg Oral Once per day on     sodium chloride flush  5-40 mL IntraVENous 2 times per day    enoxaparin  40 mg SubCUTAneous Daily    budesonide  500 mcg Nebulization BID RT    arformoterol tartrate  15 mcg Nebulization BID RT       Diet:   ADULT DIET; Regular; Low Sodium (2 gm); 2000 ml     EXAM:  General: No distress.

## 2024-04-01 NOTE — CARE COORDINATION
Met with patient. Pt. Admitted for COPD exacerbation. CTA - left pneumonia. Pt. Currently on 12L High flow nc. Home supplier is [employer]. He has home concentrator 10L flow and an [employer] concentrator. Was wearing 5-8L O2 at home prior to hospitalization. Chest vest  ordered, pt. States he has one at home. Pt. On PO Bumex, PO Zithromax, IV Cefepime, nebs. Anticipated to discharge home with no needs. Will continue to follow.  Charito SUAREZN, RN  Case Management

## 2024-04-01 NOTE — PROGRESS NOTES
[facility] Hospitalist Progress Note    Admitting Date and Time: 3/28/2024 11:38 AM  Admit Dx: COPD exacerbation (HCC) [J44.1]  Pneumonia of left lower lobe due to infectious organism [J18.9]  Acute hypoxic respiratory failure (HCC) [J96.01]    Subjective:  Patient is being followed for COPD exacerbation (HCC) [J44.1]  Pneumonia of left lower lobe due to infectious organism [J18.9]  Acute hypoxic respiratory failure (HCC) [J96.01]   Pt feels okay  Per RN: no additional concerns    ROS: denies fever, chills, cp, sob, n/v, HA unless stated above.      cefepime  2,000 mg IntraVENous Q12H    azithromycin  250 mg Oral Daily    ipratropium 0.5 mg-albuterol 2.5 mg  1 Dose Inhalation Q4H RT    bumetanide  1 mg Oral Once per day on Mon Wed Fri    sodium chloride flush  5-40 mL IntraVENous 2 times per day    enoxaparin  40 mg SubCUTAneous Daily    budesonide  500 mcg Nebulization BID RT    arformoterol tartrate  15 mcg Nebulization BID RT     oxyCODONE-acetaminophen, 1 tablet, BID PRN   And  oxyCODONE, 2.5 mg, BID PRN  sodium chloride flush, 5-40 mL, PRN  sodium chloride, , PRN  potassium chloride, 40 mEq, PRN   Or  potassium alternative oral replacement, 40 mEq, PRN   Or  potassium chloride, 10 mEq, PRN  magnesium sulfate, 2,000 mg, PRN  ondansetron, 4 mg, Q8H PRN   Or  ondansetron, 4 mg, Q6H PRN  polyethylene glycol, 17 g, Daily PRN  acetaminophen, 650 mg, Q6H PRN   Or  acetaminophen, 650 mg, Q6H PRN  melatonin, 3 mg, Nightly PRN  ipratropium 0.5 mg-albuterol 2.5 mg, 1 Dose, Q4H PRN         Objective:  BP (!) 112/58   Pulse 77   Temp 97.1 °F (36.2 °C) (Axillary)   Resp 18   Ht 1.829 m (6')   Wt 80.4 kg (177 lb 4.8 oz)   SpO2 93%   BMI 24.05 kg/m²     General Appearance: alert and oriented to person, place and time and in NAD, sitting in bed working on crossword puzzles  Skin: warm and dry  Head: normocephalic and atraumatic  Eyes: PERRL, EOMI, conjunctivae normal  Neck: neck supple, trachea midline  azithro. Urine antigens, sputum Cx GPC  Acute on Chronic respiratory Failure with Hypoxia- CTPA negative for PE. Baseline 7L NC since last admission, was 5L before that. Now on NIV, wean as able, Pulm c/s  COPDe- increased SOA x1 week, continue Nebs. Steroids and wean as able. Follows w/ Dr. Little, Pulm c/s. Abx as above, cont azithro for anti-inflammatory properties, defer steroids for now consider if O2 needs increase  Elevated proBNP- proBNP 3008, highest prior was ~3000 in 2022, no edema or crackles on exam, no pulm edema on imaging. Normally prescribed bumex 1mg MWF however stopped taking about 1 week ago, will cont for now  Orthostatic Hypotension- hold midodrine for now, BP stable at this time  Hx Tobacco Use- Cessation 2016, cont cessation    Today's exam/findings/plan  -cont abx for now, include Pseudomonas coverage, adjust as able pending sputum Cx  -O2 reqs remain ~12L HFNC and difficult to wean  -Pulm c/s, cont chest vest    NOTE: Portions of this report may have been transcribed using voice recognition software. Every effort was made to ensure accuracy; however, inadvertent computerized transcription errors may be present.  Electronically signed by Glenys Christine MD on 4/1/2024 at 4:44 PM

## 2024-04-02 LAB
ANION GAP SERPL CALCULATED.3IONS-SCNC: 6 MMOL/L (ref 7–16)
BASOPHILS # BLD: 0.05 K/UL (ref 0–0.2)
BASOPHILS NFR BLD: 1 % (ref 0–2)
BUN SERPL-MCNC: 19 MG/DL (ref 6–23)
CALCIUM SERPL-MCNC: 9.8 MG/DL (ref 8.6–10.2)
CHLORIDE SERPL-SCNC: 100 MMOL/L (ref 98–107)
CO2 SERPL-SCNC: 29 MMOL/L (ref 22–29)
CREAT SERPL-MCNC: 0.9 MG/DL (ref 0.7–1.2)
EOSINOPHIL # BLD: 0.36 K/UL (ref 0.05–0.5)
EOSINOPHILS RELATIVE PERCENT: 5 % (ref 0–6)
ERYTHROCYTE [DISTWIDTH] IN BLOOD BY AUTOMATED COUNT: 15.3 % (ref 11.5–15)
GFR SERPL CREATININE-BSD FRML MDRD: >90 ML/MIN/1.73M2
GLUCOSE SERPL-MCNC: 76 MG/DL (ref 74–99)
HCT VFR BLD AUTO: 53.1 % (ref 37–54)
HGB BLD-MCNC: 17.4 G/DL (ref 12.5–16.5)
IMM GRANULOCYTES # BLD AUTO: 0.05 K/UL (ref 0–0.58)
IMM GRANULOCYTES NFR BLD: 1 % (ref 0–5)
LYMPHOCYTES NFR BLD: 1.08 K/UL (ref 1.5–4)
LYMPHOCYTES RELATIVE PERCENT: 15 % (ref 20–42)
MCH RBC QN AUTO: 31 PG (ref 26–35)
MCHC RBC AUTO-ENTMCNC: 32.8 G/DL (ref 32–34.5)
MCV RBC AUTO: 94.5 FL (ref 80–99.9)
MONOCYTES NFR BLD: 0.61 K/UL (ref 0.1–0.95)
MONOCYTES NFR BLD: 9 % (ref 2–12)
NEUTROPHILS NFR BLD: 70 % (ref 43–80)
NEUTS SEG NFR BLD: 5.03 K/UL (ref 1.8–7.3)
PLATELET # BLD AUTO: 222 K/UL (ref 130–450)
PMV BLD AUTO: 10.3 FL (ref 7–12)
POTASSIUM SERPL-SCNC: 4.2 MMOL/L (ref 3.5–5)
RBC # BLD AUTO: 5.62 M/UL (ref 3.8–5.8)
SODIUM SERPL-SCNC: 135 MMOL/L (ref 132–146)
WBC OTHER # BLD: 7.2 K/UL (ref 4.5–11.5)

## 2024-04-02 PROCEDURE — 94640 AIRWAY INHALATION TREATMENT: CPT

## 2024-04-02 PROCEDURE — 2580000003 HC RX 258: Performed by: INTERNAL MEDICINE

## 2024-04-02 PROCEDURE — 6370000000 HC RX 637 (ALT 250 FOR IP): Performed by: INTERNAL MEDICINE

## 2024-04-02 PROCEDURE — 2580000003 HC RX 258

## 2024-04-02 PROCEDURE — 99232 SBSQ HOSP IP/OBS MODERATE 35: CPT | Performed by: STUDENT IN AN ORGANIZED HEALTH CARE EDUCATION/TRAINING PROGRAM

## 2024-04-02 PROCEDURE — 2060000000 HC ICU INTERMEDIATE R&B

## 2024-04-02 PROCEDURE — 6360000002 HC RX W HCPCS: Performed by: INTERNAL MEDICINE

## 2024-04-02 PROCEDURE — 6360000002 HC RX W HCPCS: Performed by: STUDENT IN AN ORGANIZED HEALTH CARE EDUCATION/TRAINING PROGRAM

## 2024-04-02 PROCEDURE — 2700000000 HC OXYGEN THERAPY PER DAY

## 2024-04-02 PROCEDURE — 6360000002 HC RX W HCPCS

## 2024-04-02 PROCEDURE — 94669 MECHANICAL CHEST WALL OSCILL: CPT

## 2024-04-02 PROCEDURE — 80048 BASIC METABOLIC PNL TOTAL CA: CPT

## 2024-04-02 PROCEDURE — 85025 COMPLETE CBC W/AUTO DIFF WBC: CPT

## 2024-04-02 PROCEDURE — 2580000003 HC RX 258: Performed by: STUDENT IN AN ORGANIZED HEALTH CARE EDUCATION/TRAINING PROGRAM

## 2024-04-02 PROCEDURE — 36415 COLL VENOUS BLD VENIPUNCTURE: CPT

## 2024-04-02 PROCEDURE — 94660 CPAP INITIATION&MGMT: CPT

## 2024-04-02 RX ORDER — BUDESONIDE 0.5 MG/2ML
1 INHALANT ORAL
Status: DISCONTINUED | OUTPATIENT
Start: 2024-04-02 | End: 2024-04-06 | Stop reason: HOSPADM

## 2024-04-02 RX ORDER — 0.9 % SODIUM CHLORIDE 0.9 %
250 INTRAVENOUS SOLUTION INTRAVENOUS ONCE
Status: COMPLETED | OUTPATIENT
Start: 2024-04-02 | End: 2024-04-02

## 2024-04-02 RX ADMIN — CEFEPIME 2000 MG: 2 INJECTION, POWDER, FOR SOLUTION INTRAVENOUS at 15:35

## 2024-04-02 RX ADMIN — ARFORMOTEROL TARTRATE 15 MCG: 15 SOLUTION RESPIRATORY (INHALATION) at 19:23

## 2024-04-02 RX ADMIN — ENOXAPARIN SODIUM 40 MG: 100 INJECTION SUBCUTANEOUS at 08:22

## 2024-04-02 RX ADMIN — SODIUM CHLORIDE, PRESERVATIVE FREE 10 ML: 5 INJECTION INTRAVENOUS at 08:23

## 2024-04-02 RX ADMIN — AZITHROMYCIN 250 MG: 250 TABLET, FILM COATED ORAL at 08:22

## 2024-04-02 RX ADMIN — IPRATROPIUM BROMIDE AND ALBUTEROL SULFATE 1 DOSE: 2.5; .5 SOLUTION RESPIRATORY (INHALATION) at 15:20

## 2024-04-02 RX ADMIN — BUDESONIDE INHALATION 500 MCG: 0.5 SUSPENSION RESPIRATORY (INHALATION) at 07:45

## 2024-04-02 RX ADMIN — SODIUM CHLORIDE 250 ML: 9 INJECTION, SOLUTION INTRAVENOUS at 04:17

## 2024-04-02 RX ADMIN — IPRATROPIUM BROMIDE AND ALBUTEROL SULFATE 1 DOSE: 2.5; .5 SOLUTION RESPIRATORY (INHALATION) at 19:23

## 2024-04-02 RX ADMIN — BUDESONIDE 1000 MCG: 0.5 INHALANT RESPIRATORY (INHALATION) at 19:23

## 2024-04-02 RX ADMIN — IPRATROPIUM BROMIDE AND ALBUTEROL SULFATE 1 DOSE: 2.5; .5 SOLUTION RESPIRATORY (INHALATION) at 07:45

## 2024-04-02 RX ADMIN — CEFEPIME 2000 MG: 2 INJECTION, POWDER, FOR SOLUTION INTRAVENOUS at 03:41

## 2024-04-02 RX ADMIN — IPRATROPIUM BROMIDE AND ALBUTEROL SULFATE 1 DOSE: 2.5; .5 SOLUTION RESPIRATORY (INHALATION) at 12:13

## 2024-04-02 RX ADMIN — IPRATROPIUM BROMIDE AND ALBUTEROL SULFATE 1 DOSE: 2.5; .5 SOLUTION RESPIRATORY (INHALATION) at 03:00

## 2024-04-02 RX ADMIN — ARFORMOTEROL TARTRATE 15 MCG: 15 SOLUTION RESPIRATORY (INHALATION) at 07:45

## 2024-04-02 RX ADMIN — IPRATROPIUM BROMIDE AND ALBUTEROL SULFATE 1 DOSE: 2.5; .5 SOLUTION RESPIRATORY (INHALATION) at 23:03

## 2024-04-02 NOTE — PLAN OF CARE
Problem: ABCDS Injury Assessment  Goal: Absence of physical injury  4/2/2024 0941 by Ariana Doran RN  Outcome: Progressing  4/1/2024 2137 by Isatu Landry RN  Outcome: Progressing     Problem: Discharge Planning  Goal: Discharge to home or other facility with appropriate resources  4/2/2024 0941 by Ariana Doran RN  Outcome: Progressing  4/1/2024 2137 by Isatu Landry RN  Outcome: Progressing     Problem: Safety - Adult  Goal: Free from fall injury  4/2/2024 0941 by Ariana Doran RN  Outcome: Progressing  4/1/2024 2137 by Isatu Landry RN  Outcome: Progressing     Problem: Skin/Tissue Integrity  Goal: Absence of new skin breakdown  Description: 1.  Monitor for areas of redness and/or skin breakdown  2.  Assess vascular access sites hourly  3.  Every 4-6 hours minimum:  Change oxygen saturation probe site  4.  Every 4-6 hours:  If on nasal continuous positive airway pressure, respiratory therapy assess nares and determine need for appliance change or resting period.  4/2/2024 0941 by Ariana Doran RN  Outcome: Progressing  4/1/2024 2137 by Isatu Landry RN  Outcome: Progressing     Problem: Pain  Goal: Verbalizes/displays adequate comfort level or baseline comfort level  4/2/2024 0941 by Ariana Doran RN  Outcome: Progressing  4/1/2024 2137 by Isatu Landry RN  Outcome: Progressing

## 2024-04-02 NOTE — PLAN OF CARE
Problem: ABCDS Injury Assessment  Goal: Absence of physical injury  4/2/2024 1953 by Isatu Landry RN  Outcome: Progressing  4/2/2024 0941 by Ariana Doran RN  Outcome: Progressing     Problem: Discharge Planning  Goal: Discharge to home or other facility with appropriate resources  4/2/2024 1953 by Isatu Landry RN  Outcome: Progressing  4/2/2024 0941 by Ariana Doran RN  Outcome: Progressing     Problem: Safety - Adult  Goal: Free from fall injury  4/2/2024 1953 by Isatu Landry RN  Outcome: Progressing  4/2/2024 0941 by Ariana Doran RN  Outcome: Progressing     Problem: Skin/Tissue Integrity  Goal: Absence of new skin breakdown  Description: 1.  Monitor for areas of redness and/or skin breakdown  2.  Assess vascular access sites hourly  3.  Every 4-6 hours minimum:  Change oxygen saturation probe site  4.  Every 4-6 hours:  If on nasal continuous positive airway pressure, respiratory therapy assess nares and determine need for appliance change or resting period.  4/2/2024 1953 by Isatu Landry RN  Outcome: Progressing  4/2/2024 0941 by Ariana Doran RN  Outcome: Progressing     Problem: Pain  Goal: Verbalizes/displays adequate comfort level or baseline comfort level  4/2/2024 1953 by Isatu Lanrdy RN  Outcome: Progressing  4/2/2024 0941 by Ariana Doran RN  Outcome: Progressing

## 2024-04-02 NOTE — PROGRESS NOTES
[facility] Hospitalist Progress Note    Admitting Date and Time: 3/28/2024 11:38 AM  Admit Dx: COPD exacerbation (HCC) [J44.1]  Pneumonia of left lower lobe due to infectious organism [J18.9]  Acute hypoxic respiratory failure (HCC) [J96.01]    Subjective:  Patient is being followed for COPD exacerbation (HCC) [J44.1]  Pneumonia of left lower lobe due to infectious organism [J18.9]  Acute hypoxic respiratory failure (HCC) [J96.01]   Pt feels okay  Per RN: no additional concerns    ROS: denies fever, chills, cp, sob, n/v, HA unless stated above.      budesonide  1 mg Nebulization BID RT    cefepime  2,000 mg IntraVENous Q12H    azithromycin  250 mg Oral Daily    ipratropium 0.5 mg-albuterol 2.5 mg  1 Dose Inhalation Q4H RT    bumetanide  1 mg Oral Once per day on Mon Wed Fri    sodium chloride flush  5-40 mL IntraVENous 2 times per day    enoxaparin  40 mg SubCUTAneous Daily    arformoterol tartrate  15 mcg Nebulization BID RT     oxyCODONE-acetaminophen, 1 tablet, BID PRN   And  oxyCODONE, 2.5 mg, BID PRN  sodium chloride flush, 5-40 mL, PRN  sodium chloride, , PRN  potassium chloride, 40 mEq, PRN   Or  potassium alternative oral replacement, 40 mEq, PRN   Or  potassium chloride, 10 mEq, PRN  magnesium sulfate, 2,000 mg, PRN  ondansetron, 4 mg, Q8H PRN   Or  ondansetron, 4 mg, Q6H PRN  polyethylene glycol, 17 g, Daily PRN  acetaminophen, 650 mg, Q6H PRN   Or  acetaminophen, 650 mg, Q6H PRN  melatonin, 3 mg, Nightly PRN  ipratropium 0.5 mg-albuterol 2.5 mg, 1 Dose, Q4H PRN         Objective:  /66   Pulse 100   Temp 97 °F (36.1 °C) (Axillary)   Resp 20   Ht 1.829 m (6')   Wt 83.1 kg (183 lb 4.8 oz)   SpO2 (!) 89%   BMI 24.86 kg/m²     General Appearance: alert and oriented to person, place and time and in NAD, sitting in bed working on crossword puzzles  Skin: warm and dry  Head: normocephalic and atraumatic  Eyes: PERRL, EOMI, conjunctivae normal  Neck: neck supple, trachea midline  azithro. Urine antigens, sputum Cx GPC  Acute on Chronic respiratory Failure with Hypoxia- CTPA negative for PE. Baseline 7L NC since last admission, was 5L before that. Now on NIV, wean as able, Pulm c/s  COPDe- increased SOA x1 week, continue Nebs. Steroids and wean as able. Follows w/ Dr. Little, Pulm c/s. Abx as above, cont azithro for anti-inflammatory properties, defer steroids for now consider if O2 needs increase  Elevated proBNP- proBNP 3008, highest prior was ~3000 in 2022, no edema or crackles on exam, no pulm edema on imaging. Normally prescribed bumex 1mg MWF however stopped taking about 1 week ago, will cont for now  Orthostatic Hypotension- hold midodrine for now, BP stable at this time  Hx Tobacco Use- Cessation 2016, cont cessation    Today's exam/findings/plan  -some HoTN overnight but remains stable on exam  -cont abx for now, include Pseudomonas coverage, adjust as able pending sputum Cx  -per pt discussion regarding possible bronch, defer to Pulm  -O2 reqs remain ~12L HFNC and has remained difficult to wean  -Pulm c/s, cont chest vest    NOTE: Portions of this report may have been transcribed using voice recognition software. Every effort was made to ensure accuracy; however, inadvertent computerized transcription errors may be present.  Electronically signed by Glenys Christine MD on 4/2/2024 at 12:44 PM

## 2024-04-02 NOTE — CARE COORDINATION
Chart review -  Pt. Admitted for COPD exacerbation. CTA - left pneumonia. Pt. Currently on 12L High flow nc. Home supplier is [employer]. He has home concentrator 10L flow and an Inogen concentrator. Was wearing 5-8L O2 at home prior to hospitalization. Chest vest ordered pt. states he has one at home. Currently on IV cefepime, PO zithromax, PO bumex, nebs. Pulmonology following - resp cx rare gram positive cocci in pairs. Anticipated to discharge home with no needs. Will continue to follow.   Charito SUAREZN, RN  Case Management

## 2024-04-02 NOTE — PROGRESS NOTES
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 He does have clear-some yellow sputum production with persistent cough. Desaturates with movement.       Vitals:  VITALS:  BP 97/62   Pulse 86   Temp 97.4 °F (36.3 °C) (Oral)   Resp 20   Ht 1.829 m (6')   Wt 83.1 kg (183 lb 4.8 oz)   SpO2 92%   BMI 24.86 kg/m²     24HR INTAKE/OUTPUT:      Intake/Output Summary (Last 24 hours) at 2024 1050  Last data filed at 2024 0804  Gross per 24 hour   Intake --   Output 2500 ml   Net -2500 ml         24HR PULSE OXIMETRY RANGE:    SpO2  Av.3 %  Min: 92 %  Max: 98 %    Medications:  IV:   sodium chloride         Scheduled Meds:   cefepime  2,000 mg IntraVENous Q12H    azithromycin  250 mg Oral Daily    ipratropium 0.5 mg-albuterol 2.5 mg  1 Dose Inhalation Q4H RT    bumetanide  1 mg Oral Once per day on     sodium chloride flush  5-40 mL IntraVENous 2 times per day    enoxaparin  40 mg SubCUTAneous Daily    budesonide  500 mcg Nebulization BID RT    arformoterol tartrate  15 mcg Nebulization BID RT       Diet:   ADULT DIET; Regular; Low Sodium (2 gm); 2000 ml

## 2024-04-02 NOTE — PROGRESS NOTES
Physician Progress Note      PATIENT:               LEA ANAND  CSN #:                  [id number]  :                       1950  ADMIT DATE:       3/28/2024 11:38 AM  DISCH DATE:  RESPONDING  PROVIDER #:        Glenys Christine MD          QUERY TEXT:    Pt admitted with pneumonia.  Pt noted to have \"gram positive cocci\" on   respiratory culture and in Progress note . If possible, please document in   the progress notes and discharge summary if you are evaluating and/or treating   any of the following:      Note: CAP and HCAP indicate where the pneumonia was acquired, not a specific   type.    The medical record reflects the following:  Risk Factors: COPD  Clinical Indicators: Pulmonology Consult note 3/29 \"... He also is known to   have history of Pseudomonas infection and colonization.  When last cultured,   it was pansensitive...\", Progress Note 3/31 \"...Pneumonia of left lower lobe   due to infectious organism...cont abx for now, include Pseudomonas   coverage...\", Progress Note   \"...PNA- not currently septic. CTPA showing   new LLL infiltrate. Cefepime and azithro. Urine antigens, sputum Cx GPC...\"   Respiratory Culture \"Rare Gram Positive Cocci in Pairs\"  Treatment: IV cefepime, PO azithromycin, Labs, chest x-ray, pulmonology   consult    Thank you,  Gaurav Hung, BSN, RN, [facility]  [phone]  Options provided:  -- Gram positive pneumonia  -- Gram negative pneumonia  -- Other - I will add my own diagnosis  -- Disagree - Not applicable / Not valid  -- Disagree - Clinically unable to determine / Unknown  -- Refer to Clinical Documentation Reviewer    PROVIDER RESPONSE TEXT:    Provider is clinically unable to determine a response to this query.  Unable to determine, please refer to Pulmonary specialist    Query created by: Gaurav Hung on 2024 3:10 PM      Electronically signed by:  Glenys Christine MD 2024 3:45 PM

## 2024-04-02 NOTE — PLAN OF CARE
Problem: ABCDS Injury Assessment  Goal: Absence of physical injury  4/1/2024 2137 by Isatu Landry RN  Outcome: Progressing  4/1/2024 1220 by Shane Hines RN  Outcome: Progressing     Problem: Discharge Planning  Goal: Discharge to home or other facility with appropriate resources  4/1/2024 2137 by Isatu Landry RN  Outcome: Progressing  4/1/2024 1220 by Shane Hines RN  Outcome: Progressing     Problem: Safety - Adult  Goal: Free from fall injury  4/1/2024 2137 by Isatu Landry RN  Outcome: Progressing  4/1/2024 1220 by Shane Hines RN  Outcome: Progressing     Problem: Skin/Tissue Integrity  Goal: Absence of new skin breakdown  Description: 1.  Monitor for areas of redness and/or skin breakdown  2.  Assess vascular access sites hourly  3.  Every 4-6 hours minimum:  Change oxygen saturation probe site  4.  Every 4-6 hours:  If on nasal continuous positive airway pressure, respiratory therapy assess nares and determine need for appliance change or resting period.  4/1/2024 2137 by Isatu Landry RN  Outcome: Progressing  4/1/2024 1220 by Shane Hines RN  Outcome: Progressing     Problem: Pain  Goal: Verbalizes/displays adequate comfort level or baseline comfort level  Outcome: Progressing

## 2024-04-03 LAB
ANION GAP SERPL CALCULATED.3IONS-SCNC: 10 MMOL/L (ref 7–16)
BASOPHILS # BLD: 0.05 K/UL (ref 0–0.2)
BASOPHILS NFR BLD: 1 % (ref 0–2)
BUN SERPL-MCNC: 20 MG/DL (ref 6–23)
CALCIUM SERPL-MCNC: 9.8 MG/DL (ref 8.6–10.2)
CHLORIDE SERPL-SCNC: 102 MMOL/L (ref 98–107)
CO2 SERPL-SCNC: 29 MMOL/L (ref 22–29)
CREAT SERPL-MCNC: 0.8 MG/DL (ref 0.7–1.2)
EOSINOPHIL # BLD: 0.3 K/UL (ref 0.05–0.5)
EOSINOPHILS RELATIVE PERCENT: 5 % (ref 0–6)
ERYTHROCYTE [DISTWIDTH] IN BLOOD BY AUTOMATED COUNT: 15.2 % (ref 11.5–15)
GFR SERPL CREATININE-BSD FRML MDRD: >90 ML/MIN/1.73M2
GLUCOSE SERPL-MCNC: 95 MG/DL (ref 74–99)
HCT VFR BLD AUTO: 50.9 % (ref 37–54)
HGB BLD-MCNC: 16.7 G/DL (ref 12.5–16.5)
IMM GRANULOCYTES # BLD AUTO: 0.04 K/UL (ref 0–0.58)
IMM GRANULOCYTES NFR BLD: 1 % (ref 0–5)
LYMPHOCYTES NFR BLD: 1.35 K/UL (ref 1.5–4)
LYMPHOCYTES RELATIVE PERCENT: 21 % (ref 20–42)
MCH RBC QN AUTO: 30.7 PG (ref 26–35)
MCHC RBC AUTO-ENTMCNC: 32.8 G/DL (ref 32–34.5)
MCV RBC AUTO: 93.6 FL (ref 80–99.9)
MONOCYTES NFR BLD: 0.54 K/UL (ref 0.1–0.95)
MONOCYTES NFR BLD: 8 % (ref 2–12)
NEUTROPHILS NFR BLD: 65 % (ref 43–80)
NEUTS SEG NFR BLD: 4.17 K/UL (ref 1.8–7.3)
PLATELET # BLD AUTO: 206 K/UL (ref 130–450)
PMV BLD AUTO: 10.2 FL (ref 7–12)
POTASSIUM SERPL-SCNC: 4.2 MMOL/L (ref 3.5–5)
RBC # BLD AUTO: 5.44 M/UL (ref 3.8–5.8)
SODIUM SERPL-SCNC: 141 MMOL/L (ref 132–146)
WBC OTHER # BLD: 6.5 K/UL (ref 4.5–11.5)

## 2024-04-03 PROCEDURE — 2060000000 HC ICU INTERMEDIATE R&B

## 2024-04-03 PROCEDURE — 85025 COMPLETE CBC W/AUTO DIFF WBC: CPT

## 2024-04-03 PROCEDURE — 2700000000 HC OXYGEN THERAPY PER DAY

## 2024-04-03 PROCEDURE — 2580000003 HC RX 258: Performed by: STUDENT IN AN ORGANIZED HEALTH CARE EDUCATION/TRAINING PROGRAM

## 2024-04-03 PROCEDURE — 6360000002 HC RX W HCPCS: Performed by: STUDENT IN AN ORGANIZED HEALTH CARE EDUCATION/TRAINING PROGRAM

## 2024-04-03 PROCEDURE — 94660 CPAP INITIATION&MGMT: CPT

## 2024-04-03 PROCEDURE — 6360000002 HC RX W HCPCS: Performed by: INTERNAL MEDICINE

## 2024-04-03 PROCEDURE — 94669 MECHANICAL CHEST WALL OSCILL: CPT

## 2024-04-03 PROCEDURE — 36415 COLL VENOUS BLD VENIPUNCTURE: CPT

## 2024-04-03 PROCEDURE — 2580000003 HC RX 258: Performed by: INTERNAL MEDICINE

## 2024-04-03 PROCEDURE — 6370000000 HC RX 637 (ALT 250 FOR IP): Performed by: INTERNAL MEDICINE

## 2024-04-03 PROCEDURE — 94640 AIRWAY INHALATION TREATMENT: CPT

## 2024-04-03 PROCEDURE — 99231 SBSQ HOSP IP/OBS SF/LOW 25: CPT | Performed by: STUDENT IN AN ORGANIZED HEALTH CARE EDUCATION/TRAINING PROGRAM

## 2024-04-03 PROCEDURE — 6360000002 HC RX W HCPCS

## 2024-04-03 PROCEDURE — 6370000000 HC RX 637 (ALT 250 FOR IP): Performed by: STUDENT IN AN ORGANIZED HEALTH CARE EDUCATION/TRAINING PROGRAM

## 2024-04-03 PROCEDURE — 80048 BASIC METABOLIC PNL TOTAL CA: CPT

## 2024-04-03 RX ORDER — BUMETANIDE 1 MG/1
1 TABLET ORAL
Status: DISCONTINUED | OUTPATIENT
Start: 2024-04-03 | End: 2024-04-06 | Stop reason: HOSPADM

## 2024-04-03 RX ADMIN — IPRATROPIUM BROMIDE AND ALBUTEROL SULFATE 1 DOSE: 2.5; .5 SOLUTION RESPIRATORY (INHALATION) at 19:55

## 2024-04-03 RX ADMIN — CEFEPIME 2000 MG: 2 INJECTION, POWDER, FOR SOLUTION INTRAVENOUS at 14:52

## 2024-04-03 RX ADMIN — IPRATROPIUM BROMIDE AND ALBUTEROL SULFATE 1 DOSE: 2.5; .5 SOLUTION RESPIRATORY (INHALATION) at 03:05

## 2024-04-03 RX ADMIN — ARFORMOTEROL TARTRATE 15 MCG: 15 SOLUTION RESPIRATORY (INHALATION) at 19:55

## 2024-04-03 RX ADMIN — CEFEPIME 2000 MG: 2 INJECTION, POWDER, FOR SOLUTION INTRAVENOUS at 02:57

## 2024-04-03 RX ADMIN — IPRATROPIUM BROMIDE AND ALBUTEROL SULFATE 1 DOSE: 2.5; .5 SOLUTION RESPIRATORY (INHALATION) at 08:17

## 2024-04-03 RX ADMIN — SODIUM CHLORIDE, PRESERVATIVE FREE 10 ML: 5 INJECTION INTRAVENOUS at 19:54

## 2024-04-03 RX ADMIN — IPRATROPIUM BROMIDE AND ALBUTEROL SULFATE 1 DOSE: 2.5; .5 SOLUTION RESPIRATORY (INHALATION) at 12:18

## 2024-04-03 RX ADMIN — BUDESONIDE 1000 MCG: 0.5 INHALANT RESPIRATORY (INHALATION) at 19:55

## 2024-04-03 RX ADMIN — ENOXAPARIN SODIUM 40 MG: 100 INJECTION SUBCUTANEOUS at 09:40

## 2024-04-03 RX ADMIN — SODIUM CHLORIDE, PRESERVATIVE FREE 10 ML: 5 INJECTION INTRAVENOUS at 09:43

## 2024-04-03 RX ADMIN — BUDESONIDE 1000 MCG: 0.5 INHALANT RESPIRATORY (INHALATION) at 08:17

## 2024-04-03 RX ADMIN — ARFORMOTEROL TARTRATE 15 MCG: 15 SOLUTION RESPIRATORY (INHALATION) at 08:17

## 2024-04-03 RX ADMIN — IPRATROPIUM BROMIDE AND ALBUTEROL SULFATE 1 DOSE: 2.5; .5 SOLUTION RESPIRATORY (INHALATION) at 15:28

## 2024-04-03 RX ADMIN — BUMETANIDE 1 MG: 1 TABLET ORAL at 16:10

## 2024-04-03 RX ADMIN — AZITHROMYCIN 250 MG: 250 TABLET, FILM COATED ORAL at 09:39

## 2024-04-03 NOTE — PLAN OF CARE
Problem: ABCDS Injury Assessment  Goal: Absence of physical injury  Outcome: Progressing     Problem: Discharge Planning  Goal: Discharge to home or other facility with appropriate resources  Outcome: Progressing     Problem: Safety - Adult  Goal: Free from fall injury  Outcome: Progressing     Problem: Skin/Tissue Integrity  Goal: Absence of new skin breakdown  Description: 1.  Monitor for areas of redness and/or skin breakdown  2.  Assess vascular access sites hourly  3.  Every 4-6 hours minimum:  Change oxygen saturation probe site  4.  Every 4-6 hours:  If on nasal continuous positive airway pressure, respiratory therapy assess nares and determine need for appliance change or resting period.  Outcome: Progressing     Problem: Pain  Goal: Verbalizes/displays adequate comfort level or baseline comfort level  Outcome: Progressing

## 2024-04-03 NOTE — PROGRESS NOTES
Called Dr. Christine about patient's bumex being ordered for 2200. Told to give tonight's dose now and move the time to 0900 for future.

## 2024-04-03 NOTE — CARE COORDINATION
Met with patient - Pt. Admitted for COPD exacerbation. CTA - left pneumonia. Pt. Currently on 6L nc. Home supplier is [employer]. He has home concentrator 10L flow and an Inogen concentrator. Was wearing 5-8L O2 at home prior to hospitalization. Chest vest ordered pt. states he has one at home. Currently on IV cefepime, PO zithromax, PO bumex, nebs. Pulmonology following - plan for CXR tomorrow, continue atbx, nebs, chest vest, wean O2. Possible Bronch? Anticipated to discharge home pt. voices no home needs.   Will continue to follow.    Charito SUAREZN, RN  Case Management

## 2024-04-03 NOTE — PROGRESS NOTES
[facility] Hospitalist Progress Note    Admitting Date and Time: 3/28/2024 11:38 AM  Admit Dx: COPD exacerbation (HCC) [J44.1]  Pneumonia of left lower lobe due to infectious organism [J18.9]  Acute hypoxic respiratory failure (HCC) [J96.01]    Subjective:  Patient is being followed for COPD exacerbation (HCC) [J44.1]  Pneumonia of left lower lobe due to infectious organism [J18.9]  Acute hypoxic respiratory failure (HCC) [J96.01]   Pt feels okay  Per RN: no additional concerns    ROS: denies fever, chills, cp, sob, n/v, HA unless stated above.      budesonide  1 mg Nebulization BID RT    cefepime  2,000 mg IntraVENous Q12H    azithromycin  250 mg Oral Daily    ipratropium 0.5 mg-albuterol 2.5 mg  1 Dose Inhalation Q4H RT    bumetanide  1 mg Oral Once per day on Mon Wed Fri    sodium chloride flush  5-40 mL IntraVENous 2 times per day    enoxaparin  40 mg SubCUTAneous Daily    arformoterol tartrate  15 mcg Nebulization BID RT     oxyCODONE-acetaminophen, 1 tablet, BID PRN   And  oxyCODONE, 2.5 mg, BID PRN  sodium chloride flush, 5-40 mL, PRN  sodium chloride, , PRN  potassium chloride, 40 mEq, PRN   Or  potassium alternative oral replacement, 40 mEq, PRN   Or  potassium chloride, 10 mEq, PRN  magnesium sulfate, 2,000 mg, PRN  ondansetron, 4 mg, Q8H PRN   Or  ondansetron, 4 mg, Q6H PRN  polyethylene glycol, 17 g, Daily PRN  acetaminophen, 650 mg, Q6H PRN   Or  acetaminophen, 650 mg, Q6H PRN  melatonin, 3 mg, Nightly PRN  ipratropium 0.5 mg-albuterol 2.5 mg, 1 Dose, Q4H PRN         Objective:  /75   Pulse 85   Temp 97.8 °F (36.6 °C) (Oral)   Resp 20   Ht 1.829 m (6')   Wt 81.6 kg (180 lb)   SpO2 90%   BMI 24.41 kg/m²     General Appearance: alert and oriented to person, place and time and in NAD, sitting in bed working on [employer]  Skin: warm and dry  Head: normocephalic and atraumatic  Eyes: PERRL, EOMI, conjunctivae normal  Neck: neck supple, trachea midline   Pulmonary/Chest: no  respiratory distress, faint wheezing, 6L HFNC  Cardiovascular: RRR, no murmurs  Abdomen: soft, non-tender, non-distended, normal bowel sounds, no masses or organomegaly  Extremities: no cyanosis, no clubbing and no edema  Neurologic: no cranial nerve deficit and speech normal      Recent Labs     04/01/24  0717 04/02/24  0654 04/03/24  0353    135 141   K 4.3 4.2 4.2    100 102   CO2 23 29 29   BUN 14 19 20   CREATININE 0.6* 0.9 0.8   GLUCOSE 82 76 95   CALCIUM 9.4 9.8 9.8         Recent Labs     04/01/24  0717 04/02/24  0654 04/03/24  0353   WBC 5.6 7.2 6.5   RBC 5.29 5.62 5.44   HGB 16.4 17.4* 16.7*   HCT 50.7 53.1 50.9   MCV 95.8 94.5 93.6   MCH 31.0 31.0 30.7   MCHC 32.3 32.8 32.8   RDW 15.6* 15.3* 15.2*    222 206   MPV 10.0 10.3 10.2         Radiology:  XR CHEST (2 VW)   Final Result   Stable chest with emphysematous changes in the lungs and bibasilar airspace   disease likely related to atelectasis and or scarring.  Continued   radiographic follow-up recommended.      No definite acute cardiopulmonary process is identified.         CTA PULMONARY W CONTRAST   Final Result   1. There is no pulmonary embolus   2. Advanced emphysematous changes with formation of multiple blebs and bulla   3. New infiltrate seen within the left lower lobe laterally.   4. Chronic pleuroparenchymal scarring seen within the right lung base.   .         XR CHEST PORTABLE    (Results Pending)        Assessment:  Principal Problem:    COPD exacerbation (HCC)  Active Problems:    COPD (chronic obstructive pulmonary disease) (HCC)    Chronic respiratory failure with hypoxia (HCC)    Pulmonary hypertension (HCC)    Chronic diastolic heart failure (HCC)    Acute on chronic respiratory failure with hypoxia (HCC)    IgG deficiency (HCC)    Pneumonia of left lower lobe due to infectious organism  Resolved Problems:    * No resolved hospital problems. *      Plan:  PNA- not currently septic. CTPA showing new LLL infiltrate.  Cefepime and azithro. Urine antigens, sputum Cx GPC  Acute on Chronic respiratory Failure with Hypoxia- CTPA negative for PE. Baseline 7L NC since last admission, was 5L before that. Now on NIV, wean as able, Pulm c/s  COPDe- increased SOA x1 week, continue Nebs. Steroids and wean as able. Follows w/ Dr. Little, Pulm c/s. Abx as above, cont azithro for anti-inflammatory properties, defer steroids for now consider if O2 needs increase  Elevated proBNP- proBNP 3008, highest prior was ~3000 in 2022, no edema or crackles on exam, no pulm edema on imaging. Normally prescribed bumex 1mg MWF however stopped taking about 1 week ago, will cont for now  Orthostatic Hypotension- hold midodrine for now, BP stable at this time  Hx Tobacco Use- Cessation 2016, cont cessation    Today's exam/findings/plan  -down to 6L NC, at baseline at this time  -per Pulm plan rpt imaging in AM, cont chest vest  -hopeful for dispo soon, appears to be back to baseline at this time    NOTE: Portions of this report may have been transcribed using voice recognition software. Every effort was made to ensure accuracy; however, inadvertent computerized transcription errors may be present.  Electronically signed by Glenys Christine MD on 4/3/2024 at 1:39 PM

## 2024-04-03 NOTE — PROGRESS NOTES
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 He is on 7 L high flow. Oxygen saturation was 87%-88% with recovery to 90% with rest. He feels further improvement today. Sputum is thick white in color not frequent.       Vitals:  VITALS:  /74   Pulse 88   Temp 97.7 °F (36.5 °C) (Oral)   Resp 20   Ht 1.829 m (6')   Wt 81.6 kg (180 lb)   SpO2 93%   BMI 24.41 kg/m²     24HR INTAKE/OUTPUT:      Intake/Output Summary (Last 24 hours) at 4/3/2024 0956  Last data filed at 4/3/2024 0354  Gross per 24 hour   Intake 180 ml   Output 700 ml   Net -520 ml         24HR PULSE OXIMETRY RANGE:    SpO2  Av.7 %  Min: 83 %  Max: 94 %    Medications:  IV:   sodium chloride         Scheduled Meds:   budesonide  1 mg Nebulization BID RT    cefepime  2,000 mg IntraVENous Q12H    azithromycin  250 mg Oral Daily    ipratropium 0.5 mg-albuterol 2.5 mg  1 Dose Inhalation Q4H RT    bumetanide  1 mg Oral Once per day on     sodium chloride flush  5-40 mL IntraVENous 2 times per day    enoxaparin  40 mg SubCUTAneous Daily    arformoterol tartrate  15 mcg Nebulization BID RT       Diet:   ADULT  There is no pulmonary embolus   2. Advanced emphysematous changes with formation of multiple blebs and bulla   3. New infiltrate seen within the left lower lobe laterally.   4. Chronic pleuroparenchymal scarring seen within the right lung base.   .                     Assessment:  New left lower lobe lateral segment infiltrate. Principal concern is that of Pseudomonas.  This would be not a typical presentation of MAC. Obviously, other pathogens are not excluded      Plan:  Chest imaging tomorrow morning   Pseudomonas coverage. Cefepime day 5.   Azithromycin to continue, and has anti-inflammatory properties  Vest treatments every 8 hours  Aerosol treatments.  Continue weaning oxygen to keep saturation 88%-92%.        Time at the bedside, reviewing labs and radiographs, reviewing updated notes and consultations, discussing with staff and family was more than 35 minutes.    Please note that voice recognition technology was used in the preparation of this note and make therefore it may contain inadvertent transcription errors.  If the patient is a COVID 19 isolation patient, the above physical exam reflects that of the examining physician for the day.        MIGUE Lazo - NP  Attestation    The patient was seen and personally examined.  History is obtained through the patient by myself.  Impression and plan are mine.    Documentation only provided per the nurse practitioner.    Additions and corrections are reflected in the signed note.    Rolf Little MD,  M.D., F.C.C.P.  [facility]    [facility], [address] [place] [address], [address], [place] [address]  Office visits:  [address] [place] [address]

## 2024-04-04 ENCOUNTER — APPOINTMENT (OUTPATIENT)
Dept: GENERAL RADIOLOGY | Age: 74
DRG: 193 | End: 2024-04-04
Payer: MEDICARE

## 2024-04-04 ENCOUNTER — ANESTHESIA (OUTPATIENT)
Dept: ENDOSCOPY | Age: 74
End: 2024-04-04
Payer: MEDICARE

## 2024-04-04 ENCOUNTER — ANESTHESIA EVENT (OUTPATIENT)
Dept: ENDOSCOPY | Age: 74
End: 2024-04-04
Payer: MEDICARE

## 2024-04-04 LAB
ANION GAP SERPL CALCULATED.3IONS-SCNC: 9 MMOL/L (ref 7–16)
BASOPHILS # BLD: 0.03 K/UL (ref 0–0.2)
BASOPHILS NFR BLD: 0 % (ref 0–2)
BUN SERPL-MCNC: 17 MG/DL (ref 6–23)
CALCIUM SERPL-MCNC: 10.1 MG/DL (ref 8.6–10.2)
CHLORIDE SERPL-SCNC: 102 MMOL/L (ref 98–107)
CO2 SERPL-SCNC: 26 MMOL/L (ref 22–29)
CREAT SERPL-MCNC: 0.7 MG/DL (ref 0.7–1.2)
EOSINOPHIL # BLD: 0.21 K/UL (ref 0.05–0.5)
EOSINOPHILS RELATIVE PERCENT: 3 % (ref 0–6)
ERYTHROCYTE [DISTWIDTH] IN BLOOD BY AUTOMATED COUNT: 15 % (ref 11.5–15)
GFR SERPL CREATININE-BSD FRML MDRD: >90 ML/MIN/1.73M2
GLUCOSE SERPL-MCNC: 87 MG/DL (ref 74–99)
HCT VFR BLD AUTO: 53.5 % (ref 37–54)
HGB BLD-MCNC: 17.7 G/DL (ref 12.5–16.5)
IMM GRANULOCYTES # BLD AUTO: 0.03 K/UL (ref 0–0.58)
IMM GRANULOCYTES NFR BLD: 0 % (ref 0–5)
LYMPHOCYTES NFR BLD: 1.36 K/UL (ref 1.5–4)
LYMPHOCYTES RELATIVE PERCENT: 20 % (ref 20–42)
MCH RBC QN AUTO: 31.2 PG (ref 26–35)
MCHC RBC AUTO-ENTMCNC: 33.1 G/DL (ref 32–34.5)
MCV RBC AUTO: 94.2 FL (ref 80–99.9)
MONOCYTES NFR BLD: 0.57 K/UL (ref 0.1–0.95)
MONOCYTES NFR BLD: 8 % (ref 2–12)
NEUTROPHILS NFR BLD: 68 % (ref 43–80)
NEUTS SEG NFR BLD: 4.7 K/UL (ref 1.8–7.3)
PLATELET # BLD AUTO: 202 K/UL (ref 130–450)
PMV BLD AUTO: 10.6 FL (ref 7–12)
POTASSIUM SERPL-SCNC: 4 MMOL/L (ref 3.5–5)
RBC # BLD AUTO: 5.68 M/UL (ref 3.8–5.8)
SODIUM SERPL-SCNC: 137 MMOL/L (ref 132–146)
WBC OTHER # BLD: 6.9 K/UL (ref 4.5–11.5)

## 2024-04-04 PROCEDURE — 6360000002 HC RX W HCPCS

## 2024-04-04 PROCEDURE — 80048 BASIC METABOLIC PNL TOTAL CA: CPT

## 2024-04-04 PROCEDURE — 7100000010 HC PHASE II RECOVERY - FIRST 15 MIN: Performed by: INTERNAL MEDICINE

## 2024-04-04 PROCEDURE — 2580000003 HC RX 258: Performed by: NURSE ANESTHETIST, CERTIFIED REGISTERED

## 2024-04-04 PROCEDURE — 85025 COMPLETE CBC W/AUTO DIFF WBC: CPT

## 2024-04-04 PROCEDURE — 87205 SMEAR GRAM STAIN: CPT

## 2024-04-04 PROCEDURE — 99222 1ST HOSP IP/OBS MODERATE 55: CPT | Performed by: STUDENT IN AN ORGANIZED HEALTH CARE EDUCATION/TRAINING PROGRAM

## 2024-04-04 PROCEDURE — 0BC68ZZ EXTIRPATION OF MATTER FROM RIGHT LOWER LOBE BRONCHUS, VIA NATURAL OR ARTIFICIAL OPENING ENDOSCOPIC: ICD-10-PCS | Performed by: INTERNAL MEDICINE

## 2024-04-04 PROCEDURE — 0BC38ZZ EXTIRPATION OF MATTER FROM RIGHT MAIN BRONCHUS, VIA NATURAL OR ARTIFICIAL OPENING ENDOSCOPIC: ICD-10-PCS | Performed by: INTERNAL MEDICINE

## 2024-04-04 PROCEDURE — 0B9J8ZZ DRAINAGE OF LEFT LOWER LUNG LOBE, VIA NATURAL OR ARTIFICIAL OPENING ENDOSCOPIC: ICD-10-PCS | Performed by: INTERNAL MEDICINE

## 2024-04-04 PROCEDURE — 6360000002 HC RX W HCPCS: Performed by: NURSE ANESTHETIST, CERTIFIED REGISTERED

## 2024-04-04 PROCEDURE — 94640 AIRWAY INHALATION TREATMENT: CPT

## 2024-04-04 PROCEDURE — 6370000000 HC RX 637 (ALT 250 FOR IP): Performed by: INTERNAL MEDICINE

## 2024-04-04 PROCEDURE — 0BC48ZZ EXTIRPATION OF MATTER FROM RIGHT UPPER LOBE BRONCHUS, VIA NATURAL OR ARTIFICIAL OPENING ENDOSCOPIC: ICD-10-PCS | Performed by: INTERNAL MEDICINE

## 2024-04-04 PROCEDURE — 87106 FUNGI IDENTIFICATION YEAST: CPT

## 2024-04-04 PROCEDURE — 87070 CULTURE OTHR SPECIMN AEROBIC: CPT

## 2024-04-04 PROCEDURE — 0BDJ8ZX EXTRACTION OF LEFT LOWER LUNG LOBE, VIA NATURAL OR ARTIFICIAL OPENING ENDOSCOPIC, DIAGNOSTIC: ICD-10-PCS | Performed by: INTERNAL MEDICINE

## 2024-04-04 PROCEDURE — 2580000003 HC RX 258: Performed by: INTERNAL MEDICINE

## 2024-04-04 PROCEDURE — 0BC58ZZ EXTIRPATION OF MATTER FROM RIGHT MIDDLE LOBE BRONCHUS, VIA NATURAL OR ARTIFICIAL OPENING ENDOSCOPIC: ICD-10-PCS | Performed by: INTERNAL MEDICINE

## 2024-04-04 PROCEDURE — 87015 SPECIMEN INFECT AGNT CONCNTJ: CPT

## 2024-04-04 PROCEDURE — 2709999900 HC NON-CHARGEABLE SUPPLY: Performed by: INTERNAL MEDICINE

## 2024-04-04 PROCEDURE — 87102 FUNGUS ISOLATION CULTURE: CPT

## 2024-04-04 PROCEDURE — 6360000002 HC RX W HCPCS: Performed by: STUDENT IN AN ORGANIZED HEALTH CARE EDUCATION/TRAINING PROGRAM

## 2024-04-04 PROCEDURE — 0BC18ZZ EXTIRPATION OF MATTER FROM TRACHEA, VIA NATURAL OR ARTIFICIAL OPENING ENDOSCOPIC: ICD-10-PCS | Performed by: INTERNAL MEDICINE

## 2024-04-04 PROCEDURE — 2060000000 HC ICU INTERMEDIATE R&B

## 2024-04-04 PROCEDURE — 71045 X-RAY EXAM CHEST 1 VIEW: CPT

## 2024-04-04 PROCEDURE — 36415 COLL VENOUS BLD VENIPUNCTURE: CPT

## 2024-04-04 PROCEDURE — 7100000011 HC PHASE II RECOVERY - ADDTL 15 MIN: Performed by: INTERNAL MEDICINE

## 2024-04-04 PROCEDURE — 87206 SMEAR FLUORESCENT/ACID STAI: CPT

## 2024-04-04 PROCEDURE — 3700000000 HC ANESTHESIA ATTENDED CARE: Performed by: INTERNAL MEDICINE

## 2024-04-04 PROCEDURE — 2700000000 HC OXYGEN THERAPY PER DAY

## 2024-04-04 PROCEDURE — 87181 SC STD AGAR DILUTION PER AGT: CPT

## 2024-04-04 PROCEDURE — 3700000001 HC ADD 15 MINUTES (ANESTHESIA): Performed by: INTERNAL MEDICINE

## 2024-04-04 PROCEDURE — 2580000003 HC RX 258: Performed by: STUDENT IN AN ORGANIZED HEALTH CARE EDUCATION/TRAINING PROGRAM

## 2024-04-04 PROCEDURE — 2500000003 HC RX 250 WO HCPCS: Performed by: NURSE ANESTHETIST, CERTIFIED REGISTERED

## 2024-04-04 PROCEDURE — 87116 MYCOBACTERIA CULTURE: CPT

## 2024-04-04 PROCEDURE — 87107 FUNGI IDENTIFICATION MOLD: CPT

## 2024-04-04 PROCEDURE — 94669 MECHANICAL CHEST WALL OSCILL: CPT

## 2024-04-04 PROCEDURE — 6370000000 HC RX 637 (ALT 250 FOR IP): Performed by: NURSE PRACTITIONER

## 2024-04-04 PROCEDURE — 94660 CPAP INITIATION&MGMT: CPT

## 2024-04-04 PROCEDURE — 6360000002 HC RX W HCPCS: Performed by: INTERNAL MEDICINE

## 2024-04-04 PROCEDURE — 94668 MNPJ CHEST WALL SBSQ: CPT

## 2024-04-04 PROCEDURE — 3609027000 HC BRONCHOSCOPY: Performed by: INTERNAL MEDICINE

## 2024-04-04 RX ORDER — NALOXONE HYDROCHLORIDE 0.4 MG/ML
INJECTION, SOLUTION INTRAMUSCULAR; INTRAVENOUS; SUBCUTANEOUS PRN
Status: DISCONTINUED | OUTPATIENT
Start: 2024-04-04 | End: 2024-04-04 | Stop reason: HOSPADM

## 2024-04-04 RX ORDER — DIPHENHYDRAMINE HYDROCHLORIDE 50 MG/ML
12.5 INJECTION INTRAMUSCULAR; INTRAVENOUS
Status: DISCONTINUED | OUTPATIENT
Start: 2024-04-04 | End: 2024-04-04 | Stop reason: HOSPADM

## 2024-04-04 RX ORDER — LABETALOL HYDROCHLORIDE 5 MG/ML
5 INJECTION, SOLUTION INTRAVENOUS
Status: DISCONTINUED | OUTPATIENT
Start: 2024-04-04 | End: 2024-04-04 | Stop reason: HOSPADM

## 2024-04-04 RX ORDER — PROPOFOL 10 MG/ML
INJECTION, EMULSION INTRAVENOUS PRN
Status: DISCONTINUED | OUTPATIENT
Start: 2024-04-04 | End: 2024-04-04 | Stop reason: SDUPTHER

## 2024-04-04 RX ORDER — SODIUM CHLORIDE 9 MG/ML
INJECTION, SOLUTION INTRAVENOUS PRN
Status: DISCONTINUED | OUTPATIENT
Start: 2024-04-04 | End: 2024-04-04 | Stop reason: HOSPADM

## 2024-04-04 RX ORDER — MEPERIDINE HYDROCHLORIDE 25 MG/ML
12.5 INJECTION INTRAMUSCULAR; INTRAVENOUS; SUBCUTANEOUS ONCE
Status: DISCONTINUED | OUTPATIENT
Start: 2024-04-04 | End: 2024-04-04 | Stop reason: HOSPADM

## 2024-04-04 RX ORDER — SODIUM CHLORIDE 0.9 % (FLUSH) 0.9 %
5-40 SYRINGE (ML) INJECTION PRN
Status: DISCONTINUED | OUTPATIENT
Start: 2024-04-04 | End: 2024-04-04 | Stop reason: HOSPADM

## 2024-04-04 RX ORDER — FENTANYL CITRATE 50 UG/ML
25 INJECTION, SOLUTION INTRAMUSCULAR; INTRAVENOUS EVERY 5 MIN PRN
Status: DISCONTINUED | OUTPATIENT
Start: 2024-04-04 | End: 2024-04-04 | Stop reason: HOSPADM

## 2024-04-04 RX ORDER — LIDOCAINE HYDROCHLORIDE 20 MG/ML
INJECTION, SOLUTION INFILTRATION; PERINEURAL PRN
Status: DISCONTINUED | OUTPATIENT
Start: 2024-04-04 | End: 2024-04-04 | Stop reason: SDUPTHER

## 2024-04-04 RX ORDER — PROCHLORPERAZINE EDISYLATE 5 MG/ML
5 INJECTION INTRAMUSCULAR; INTRAVENOUS
Status: DISCONTINUED | OUTPATIENT
Start: 2024-04-04 | End: 2024-04-04 | Stop reason: HOSPADM

## 2024-04-04 RX ORDER — LIDOCAINE HYDROCHLORIDE 20 MG/ML
SOLUTION OROPHARYNGEAL PRN
Status: DISCONTINUED | OUTPATIENT
Start: 2024-04-04 | End: 2024-04-04 | Stop reason: ALTCHOICE

## 2024-04-04 RX ORDER — SODIUM CHLORIDE 0.9 % (FLUSH) 0.9 %
5-40 SYRINGE (ML) INJECTION EVERY 12 HOURS SCHEDULED
Status: DISCONTINUED | OUTPATIENT
Start: 2024-04-04 | End: 2024-04-04 | Stop reason: HOSPADM

## 2024-04-04 RX ORDER — HYDRALAZINE HYDROCHLORIDE 20 MG/ML
5 INJECTION INTRAMUSCULAR; INTRAVENOUS
Status: DISCONTINUED | OUTPATIENT
Start: 2024-04-04 | End: 2024-04-04 | Stop reason: HOSPADM

## 2024-04-04 RX ORDER — SODIUM CHLORIDE 9 MG/ML
INJECTION, SOLUTION INTRAVENOUS CONTINUOUS PRN
Status: DISCONTINUED | OUTPATIENT
Start: 2024-04-04 | End: 2024-04-04 | Stop reason: SDUPTHER

## 2024-04-04 RX ADMIN — AZITHROMYCIN 250 MG: 250 TABLET, FILM COATED ORAL at 08:57

## 2024-04-04 RX ADMIN — LIDOCAINE HYDROCHLORIDE 40 MG: 20 INJECTION, SOLUTION INFILTRATION; PERINEURAL at 13:32

## 2024-04-04 RX ADMIN — CEFEPIME 2000 MG: 2 INJECTION, POWDER, FOR SOLUTION INTRAVENOUS at 15:18

## 2024-04-04 RX ADMIN — IPRATROPIUM BROMIDE AND ALBUTEROL SULFATE 1 DOSE: 2.5; .5 SOLUTION RESPIRATORY (INHALATION) at 23:57

## 2024-04-04 RX ADMIN — IPRATROPIUM BROMIDE AND ALBUTEROL SULFATE 1 DOSE: 2.5; .5 SOLUTION RESPIRATORY (INHALATION) at 07:45

## 2024-04-04 RX ADMIN — IPRATROPIUM BROMIDE AND ALBUTEROL SULFATE 1 DOSE: 2.5; .5 SOLUTION RESPIRATORY (INHALATION) at 11:56

## 2024-04-04 RX ADMIN — ARFORMOTEROL TARTRATE 15 MCG: 15 SOLUTION RESPIRATORY (INHALATION) at 07:45

## 2024-04-04 RX ADMIN — IPRATROPIUM BROMIDE AND ALBUTEROL SULFATE 1 DOSE: 2.5; .5 SOLUTION RESPIRATORY (INHALATION) at 20:32

## 2024-04-04 RX ADMIN — BUDESONIDE 1000 MCG: 0.5 INHALANT RESPIRATORY (INHALATION) at 20:32

## 2024-04-04 RX ADMIN — BUDESONIDE 1000 MCG: 0.5 INHALANT RESPIRATORY (INHALATION) at 07:45

## 2024-04-04 RX ADMIN — ARFORMOTEROL TARTRATE 15 MCG: 15 SOLUTION RESPIRATORY (INHALATION) at 20:32

## 2024-04-04 RX ADMIN — IPRATROPIUM BROMIDE AND ALBUTEROL SULFATE 1 DOSE: 2.5; .5 SOLUTION RESPIRATORY (INHALATION) at 00:48

## 2024-04-04 RX ADMIN — IPRATROPIUM BROMIDE AND ALBUTEROL SULFATE 1 DOSE: 2.5; .5 SOLUTION RESPIRATORY (INHALATION) at 15:25

## 2024-04-04 RX ADMIN — IPRATROPIUM BROMIDE AND ALBUTEROL SULFATE 1 DOSE: 2.5; .5 SOLUTION RESPIRATORY (INHALATION) at 03:36

## 2024-04-04 RX ADMIN — CEFEPIME 2000 MG: 2 INJECTION, POWDER, FOR SOLUTION INTRAVENOUS at 03:07

## 2024-04-04 RX ADMIN — PROPOFOL 110 MG: 10 INJECTION, EMULSION INTRAVENOUS at 13:32

## 2024-04-04 RX ADMIN — SODIUM CHLORIDE: 9 INJECTION, SOLUTION INTRAVENOUS at 13:00

## 2024-04-04 RX ADMIN — OXYCODONE AND ACETAMINOPHEN 1 TABLET: 5; 325 TABLET ORAL at 20:07

## 2024-04-04 RX ADMIN — SODIUM CHLORIDE, PRESERVATIVE FREE 10 ML: 5 INJECTION INTRAVENOUS at 08:57

## 2024-04-04 ASSESSMENT — PAIN SCALES - GENERAL: PAINLEVEL_OUTOF10: 8

## 2024-04-04 ASSESSMENT — ENCOUNTER SYMPTOMS
DYSPNEA ACTIVITY LEVEL: NO INTERVAL CHANGE
SHORTNESS OF BREATH: 1

## 2024-04-04 ASSESSMENT — PAIN DESCRIPTION - LOCATION: LOCATION: THROAT

## 2024-04-04 ASSESSMENT — PAIN DESCRIPTION - ORIENTATION: ORIENTATION: MID

## 2024-04-04 ASSESSMENT — COPD QUESTIONNAIRES: CAT_SEVERITY: NO INTERVAL CHANGE

## 2024-04-04 ASSESSMENT — PAIN DESCRIPTION - DESCRIPTORS: DESCRIPTORS: ACHING;DISCOMFORT;SORE

## 2024-04-04 NOTE — OP NOTE
Operative Note      Patient: Jason Cox  YOB: 1950  MRN: [id number]    Date of Procedure: 4/4/2024    Pre-Op Diagnosis Codes:     * Pneumonia of left lower lobe due to infectious organism [J18.9]    Post-Op Diagnosis: Same       Procedure(s):  BRONCHOSCOPY DIAGNOSTIC OR CELL WASH ONLY    Surgeon(s):  Rolf Little MD    Assistant:   * No surgical staff found *    Anesthesia: Monitor Anesthesia Care    Estimated Blood Loss (mL): Minimal    Complications: None    Specimens:   ID Type Source Tests Collected by Time Destination   1 : micro wash LLL Respiratory Bronchial Washing CULTURE, FUNGUS, GRAM STAIN, CULTURE WITH SMEAR, ACID FAST BACILLIUS, CULTURE, RESPIRATORY Rolf Little MD 4/4/2024 1338        Implants:  * No implants in log *      Drains: * No LDAs found *    Findings:  Other Findings: Purulent secretions in LLL    Detailed Description of Procedure:   The patient was informed of the procedure, consent was obtained.  Fiberoptic bronchoscope was passed through the oropharynx.  He appeared normal.  The vocal cords move promptly to the midline the trachea was intubated.  The right and left lungs were inspected.  Thick mucus was noted in the right tracheobronchial tree and this was aspirated.    The left mainstem was then intubated left upper lobe and lingula both appeared normal.  Upon entry into the left lower lobe, purulent secretions were noted at the base of the left lung percolating up from all the segments except superior segment.  This was washed and sent for culture, sensitivity, AFB and fungus.    Patient tolerated the procedure well and was sent to recovery in stable condition.    Electronically signed by Rolf Little MD on 4/4/2024 at 1:44 PM

## 2024-04-04 NOTE — CARE COORDINATION
Chart review - Pt. Admitted for COPD exacerbation. CTA - left pneumonia. Pt. Currently on 6L O2 nc. Baseline is  5-8L nc. Home supplier is [employer]. He has home concentrator 10L flow and an [employer] concentrator. Chest vest ordered pt. states he has one at home. Currently on IV cefepime, PO bumex, nebs.Pulmonology following - repeat CXR completed plan for Bronch today. Continue atbx, nebs, chest vest, wean O2.  Anticipated to discharge home pt. voices no home needs. Will continue to follow.  Charito SUAREZN, RN  Case Management

## 2024-04-04 NOTE — PROGRESS NOTES
[facility] Hospitalist Progress Note    Admitting Date and Time: 3/28/2024 11:38 AM  Admit Dx: COPD exacerbation (HCC) [J44.1]  Pneumonia of left lower lobe due to infectious organism [J18.9]  Acute hypoxic respiratory failure (HCC) [J96.01]    Subjective:  Patient is being followed for COPD exacerbation (HCC) [J44.1]  Pneumonia of left lower lobe due to infectious organism [J18.9]  Acute hypoxic respiratory failure (HCC) [J96.01]   Pt feels Okay  Per RN: No concerns    ROS: denies fever, chills, cp, sob, n/v, HA unless stated above.      bumetanide  1 mg Oral Once per day on Mon Wed Fri    budesonide  1 mg Nebulization BID RT    cefepime  2,000 mg IntraVENous Q12H    ipratropium 0.5 mg-albuterol 2.5 mg  1 Dose Inhalation Q4H RT    sodium chloride flush  5-40 mL IntraVENous 2 times per day    enoxaparin  40 mg SubCUTAneous Daily    arformoterol tartrate  15 mcg Nebulization BID RT     oxyCODONE-acetaminophen, 1 tablet, BID PRN   And  oxyCODONE, 2.5 mg, BID PRN  sodium chloride flush, 5-40 mL, PRN  sodium chloride, , PRN  potassium chloride, 40 mEq, PRN   Or  potassium alternative oral replacement, 40 mEq, PRN   Or  potassium chloride, 10 mEq, PRN  magnesium sulfate, 2,000 mg, PRN  ondansetron, 4 mg, Q8H PRN   Or  ondansetron, 4 mg, Q6H PRN  polyethylene glycol, 17 g, Daily PRN  acetaminophen, 650 mg, Q6H PRN   Or  acetaminophen, 650 mg, Q6H PRN  melatonin, 3 mg, Nightly PRN  ipratropium 0.5 mg-albuterol 2.5 mg, 1 Dose, Q4H PRN         Objective:    BP 95/76   Pulse 91   Temp 97.2 °F (36.2 °C) (Infrared)   Resp 20   Ht 1.829 m (6')   Wt 80.8 kg (178 lb 3.2 oz)   SpO2 (!) 89%   BMI 24.17 kg/m²     General Appearance: alert and oriented to person, place and time and in no acute distress  Skin: warm and dry  Head: normocephalic and atraumatic  Eyes: pupils equal, round, and reactive to light, extraocular eye movements intact, conjunctivae normal  Neck: neck supple and non tender without mass

## 2024-04-04 NOTE — ANESTHESIA POSTPROCEDURE EVALUATION
Department of Anesthesiology  Postprocedure Note    Patient: Jason Cox  MRN: [id number]  YOB: 1950  Date of evaluation: 4/4/2024    Procedure Summary       Date: 04/04/24 Room / Location: [address]    Anesthesia Start: 1326 Anesthesia Stop:     Procedure: BRONCHOSCOPY DIAGNOSTIC OR CELL WASH ONLY (Left) Diagnosis:       Pneumonia of left lower lobe due to infectious organism      (Pneumonia of left lower lobe due to infectious organism [J18.9])    Surgeons: Rolf Little MD Responsible Provider: Chente Geiger DO    Anesthesia Type: MAC ASA Status: 4            Anesthesia Type: No value filed.    Moon Phase I:      Moon Phase II:      Anesthesia Post Evaluation    Patient location during evaluation: PACU  Patient participation: complete - patient participated  Level of consciousness: awake  Airway patency: patent  Nausea & Vomiting: no nausea and no vomiting  Cardiovascular status: hemodynamically stable  Respiratory status: acceptable  Hydration status: euvolemic  Pain management: adequate        No notable events documented.

## 2024-04-04 NOTE — PLAN OF CARE
Problem: ABCDS Injury Assessment  Goal: Absence of physical injury  4/3/2024 2118 by Airam Ashton RN  Outcome: Progressing  4/3/2024 1019 by Ariana Doran RN  Outcome: Progressing     Problem: Discharge Planning  Goal: Discharge to home or other facility with appropriate resources  4/3/2024 2118 by Airam Ashton RN  Outcome: Progressing  4/3/2024 1019 by Ariana Doran RN  Outcome: Progressing     Problem: Safety - Adult  Goal: Free from fall injury  4/3/2024 2118 by Airam Ashton RN  Outcome: Progressing  4/3/2024 1019 by Ariana Doran RN  Outcome: Progressing     Problem: Skin/Tissue Integrity  Goal: Absence of new skin breakdown  Description: 1.  Monitor for areas of redness and/or skin breakdown  2.  Assess vascular access sites hourly  3.  Every 4-6 hours minimum:  Change oxygen saturation probe site  4.  Every 4-6 hours:  If on nasal continuous positive airway pressure, respiratory therapy assess nares and determine need for appliance change or resting period.  4/3/2024 2118 by Airam Ashton RN  Outcome: Progressing  4/3/2024 1019 by Ariana Doran RN  Outcome: Progressing     Problem: Pain  Goal: Verbalizes/displays adequate comfort level or baseline comfort level  4/3/2024 2118 by Airam Ashton RN  Outcome: Progressing  4/3/2024 1019 by Ariana Doran RN  Outcome: Progressing

## 2024-04-04 NOTE — PROGRESS NOTES
Nursing Transfer Note    Data:  Summary of patients progress: s/p bronchoscopy   Reason for transfer: inpatient hospital stay - pacu discharge criteria met    Action:  Explained reason for transfer to Patient.  Report given to: Esme GUERRERO Rn, using RN Handoff Navigator.  Mode of transportation: cart    Response:  RN Recommendations: see notes/orders/MAR

## 2024-04-05 LAB
MICROORGANISM SPEC CULT: NORMAL
MICROORGANISM/AGENT SPEC: NORMAL
SPECIMEN DESCRIPTION: NORMAL

## 2024-04-05 PROCEDURE — 94640 AIRWAY INHALATION TREATMENT: CPT

## 2024-04-05 PROCEDURE — 94668 MNPJ CHEST WALL SBSQ: CPT

## 2024-04-05 PROCEDURE — 94669 MECHANICAL CHEST WALL OSCILL: CPT

## 2024-04-05 PROCEDURE — 2060000000 HC ICU INTERMEDIATE R&B

## 2024-04-05 PROCEDURE — 6360000002 HC RX W HCPCS: Performed by: INTERNAL MEDICINE

## 2024-04-05 PROCEDURE — 6360000002 HC RX W HCPCS: Performed by: STUDENT IN AN ORGANIZED HEALTH CARE EDUCATION/TRAINING PROGRAM

## 2024-04-05 PROCEDURE — 2700000000 HC OXYGEN THERAPY PER DAY

## 2024-04-05 PROCEDURE — 6360000002 HC RX W HCPCS

## 2024-04-05 PROCEDURE — 6370000000 HC RX 637 (ALT 250 FOR IP): Performed by: STUDENT IN AN ORGANIZED HEALTH CARE EDUCATION/TRAINING PROGRAM

## 2024-04-05 PROCEDURE — 6370000000 HC RX 637 (ALT 250 FOR IP): Performed by: INTERNAL MEDICINE

## 2024-04-05 PROCEDURE — 2580000003 HC RX 258: Performed by: INTERNAL MEDICINE

## 2024-04-05 PROCEDURE — 2580000003 HC RX 258: Performed by: STUDENT IN AN ORGANIZED HEALTH CARE EDUCATION/TRAINING PROGRAM

## 2024-04-05 PROCEDURE — 99233 SBSQ HOSP IP/OBS HIGH 50: CPT | Performed by: INTERNAL MEDICINE

## 2024-04-05 PROCEDURE — 94660 CPAP INITIATION&MGMT: CPT

## 2024-04-05 RX ADMIN — BUMETANIDE 1 MG: 1 TABLET ORAL at 08:06

## 2024-04-05 RX ADMIN — CEFEPIME 2000 MG: 2 INJECTION, POWDER, FOR SOLUTION INTRAVENOUS at 23:26

## 2024-04-05 RX ADMIN — ARFORMOTEROL TARTRATE 15 MCG: 15 SOLUTION RESPIRATORY (INHALATION) at 20:26

## 2024-04-05 RX ADMIN — BUDESONIDE 1000 MCG: 0.5 INHALANT RESPIRATORY (INHALATION) at 08:35

## 2024-04-05 RX ADMIN — CEFEPIME 2000 MG: 2 INJECTION, POWDER, FOR SOLUTION INTRAVENOUS at 11:14

## 2024-04-05 RX ADMIN — BUDESONIDE 1000 MCG: 0.5 INHALANT RESPIRATORY (INHALATION) at 20:26

## 2024-04-05 RX ADMIN — IPRATROPIUM BROMIDE AND ALBUTEROL SULFATE 1 DOSE: 2.5; .5 SOLUTION RESPIRATORY (INHALATION) at 08:34

## 2024-04-05 RX ADMIN — SODIUM CHLORIDE, PRESERVATIVE FREE 10 ML: 5 INJECTION INTRAVENOUS at 09:12

## 2024-04-05 RX ADMIN — SODIUM CHLORIDE, PRESERVATIVE FREE 10 ML: 5 INJECTION INTRAVENOUS at 23:27

## 2024-04-05 RX ADMIN — ENOXAPARIN SODIUM 40 MG: 100 INJECTION SUBCUTANEOUS at 08:06

## 2024-04-05 RX ADMIN — ARFORMOTEROL TARTRATE 15 MCG: 15 SOLUTION RESPIRATORY (INHALATION) at 08:35

## 2024-04-05 RX ADMIN — IPRATROPIUM BROMIDE AND ALBUTEROL SULFATE 1 DOSE: 2.5; .5 SOLUTION RESPIRATORY (INHALATION) at 11:49

## 2024-04-05 RX ADMIN — IPRATROPIUM BROMIDE AND ALBUTEROL SULFATE 1 DOSE: 2.5; .5 SOLUTION RESPIRATORY (INHALATION) at 03:39

## 2024-04-05 RX ADMIN — IPRATROPIUM BROMIDE AND ALBUTEROL SULFATE 1 DOSE: 2.5; .5 SOLUTION RESPIRATORY (INHALATION) at 15:39

## 2024-04-05 RX ADMIN — IPRATROPIUM BROMIDE AND ALBUTEROL SULFATE 1 DOSE: 2.5; .5 SOLUTION RESPIRATORY (INHALATION) at 20:25

## 2024-04-05 NOTE — PLAN OF CARE
Problem: ABCDS Injury Assessment  Goal: Absence of physical injury  4/5/2024 1015 by Ariana Doran RN  Outcome: Progressing  4/4/2024 2247 by Airam Ashton RN  Outcome: Progressing     Problem: Discharge Planning  Goal: Discharge to home or other facility with appropriate resources  4/5/2024 1015 by Ariana Doran RN  Outcome: Progressing  4/4/2024 2247 by Airam Ashton RN  Outcome: Progressing     Problem: Safety - Adult  Goal: Free from fall injury  4/5/2024 1015 by Ariana Doran RN  Outcome: Progressing  4/4/2024 2247 by Airam Ashton RN  Outcome: Progressing     Problem: Skin/Tissue Integrity  Goal: Absence of new skin breakdown  Description: 1.  Monitor for areas of redness and/or skin breakdown  2.  Assess vascular access sites hourly  3.  Every 4-6 hours minimum:  Change oxygen saturation probe site  4.  Every 4-6 hours:  If on nasal continuous positive airway pressure, respiratory therapy assess nares and determine need for appliance change or resting period.  4/5/2024 1015 by Ariana Doran RN  Outcome: Progressing  4/4/2024 2247 by Airam Ashton RN  Outcome: Progressing     Problem: Pain  Goal: Verbalizes/displays adequate comfort level or baseline comfort level  4/5/2024 1015 by Ariana Doran RN  Outcome: Progressing  4/4/2024 2247 by Airam Ashton RN  Outcome: Progressing

## 2024-04-05 NOTE — PROGRESS NOTES
[facility] Hospitalist Progress Note    Admitting Date and Time: 3/28/2024 11:38 AM  Admit Dx: COPD exacerbation (HCC) [J44.1]  Pneumonia of left lower lobe due to infectious organism [J18.9]  Acute hypoxic respiratory failure (HCC) [J96.01]    Subjective:  Patient is being followed for COPD exacerbation (HCC) [J44.1]  Pneumonia of left lower lobe due to infectious organism [J18.9]  Acute hypoxic respiratory failure (HCC) [J96.01]     Patient states that his shortness of breath has improved    ROS: denies fever, chills, cp, sob, n/v, HA unless stated above.      bumetanide  1 mg Oral Once per day on Mon Wed Fri    budesonide  1 mg Nebulization BID RT    cefepime  2,000 mg IntraVENous Q12H    ipratropium 0.5 mg-albuterol 2.5 mg  1 Dose Inhalation Q4H RT    sodium chloride flush  5-40 mL IntraVENous 2 times per day    enoxaparin  40 mg SubCUTAneous Daily    arformoterol tartrate  15 mcg Nebulization BID RT     oxyCODONE-acetaminophen, 1 tablet, BID PRN   And  oxyCODONE, 2.5 mg, BID PRN  sodium chloride flush, 5-40 mL, PRN  sodium chloride, , PRN  potassium chloride, 40 mEq, PRN   Or  potassium alternative oral replacement, 40 mEq, PRN   Or  potassium chloride, 10 mEq, PRN  magnesium sulfate, 2,000 mg, PRN  ondansetron, 4 mg, Q8H PRN   Or  ondansetron, 4 mg, Q6H PRN  polyethylene glycol, 17 g, Daily PRN  acetaminophen, 650 mg, Q6H PRN   Or  acetaminophen, 650 mg, Q6H PRN  melatonin, 3 mg, Nightly PRN  ipratropium 0.5 mg-albuterol 2.5 mg, 1 Dose, Q4H PRN         Objective:    /77   Pulse 100   Temp 98 °F (36.7 °C) (Oral)   Resp 18   Ht 1.829 m (6')   Wt 81.4 kg (179 lb 6.4 oz)   SpO2 90%   BMI 24.33 kg/m²     General Appearance: alert and oriented to person, place and time and in no acute distress  Skin: warm and dry  Head: normocephalic and atraumatic  Eyes: pupils equal, round, extraocular eye movements intact, conjunctivae normal  Pulmonary/Chest: clear to auscultation bilaterally- no wheezes,  rales or rhonchi, normal air movement, no respiratory distress  Cardiovascular: normal rate, normal S1 and S2   Abdomen: soft, non-tender, non-distended, normal bowel sounds,   Extremities: no cyanosis, no clubbing and no edema  Neurologic: no cranial nerve deficit and speech normal      Recent Labs     04/03/24  0353 04/04/24  0633    137   K 4.2 4.0    102   CO2 29 26   BUN 20 17   CREATININE 0.8 0.7   GLUCOSE 95 87   CALCIUM 9.8 10.1       Recent Labs     04/03/24  0353 04/04/24  0633   WBC 6.5 6.9   RBC 5.44 5.68   HGB 16.7* 17.7*   HCT 50.9 53.5   MCV 93.6 94.2   MCH 30.7 31.2   MCHC 32.8 33.1   RDW 15.2* 15.0    202   MPV 10.2 10.6       Assessment:    Principal Problem:    COPD exacerbation (HCC)  Active Problems:    COPD (chronic obstructive pulmonary disease) (HCC)    Chronic respiratory failure with hypoxia (HCC)    Pulmonary hypertension (HCC)    Chronic diastolic heart failure (HCC)    Acute on chronic respiratory failure with hypoxia (HCC)    IgG deficiency (HCC)    Pneumonia of left lower lobe due to infectious organism  Resolved Problems:    * No resolved hospital problems. *      Plan:    Acute on chronic hypoxic respiratory failure 2/2 COPD exacerbation - He typically wears 4-5 L NC. Currently back to baseline. He did have a bronchoscopy done on 4/4 with cultures pending.   COPD exacerbation - Continue steroids and breathing treatment. Completed 7 days of azithromycin and is on day 8 of cefepime.  DVT prophylaxis - Lovenox      NOTE: This report was transcribed using voice recognition software. Every effort was made to ensure accuracy; however, inadvertent computerized transcription errors may be present.    Electronically signed by Lisa Padilla DO on 4/5/2024 at 8:52 AM

## 2024-04-05 NOTE — PROGRESS NOTES
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  No complications.  The patient's FiO2 was come down relatively dramatically.      Vitals:  VITALS:  /73   Pulse 92   Temp 97.9 °F (36.6 °C) (Oral)   Resp 18   Ht 1.829 m (6')   Wt 81.4 kg (179 lb 6.4 oz)   SpO2 91%   BMI 24.33 kg/m²     24HR INTAKE/OUTPUT:      Intake/Output Summary (Last 24 hours) at 2024 1621  Last data filed at 2024 1336  Gross per 24 hour   Intake --   Output 550 ml   Net -550 ml         24HR PULSE OXIMETRY RANGE:    SpO2  Av.4 %  Min: 87 %  Max: 93 %    Medications:  IV:   sodium chloride         Scheduled Meds:   bumetanide  1 mg Oral Once per day on     budesonide  1 mg Nebulization BID RT    cefepime  2,000 mg IntraVENous Q12H    ipratropium 0.5 mg-albuterol 2.5 mg  1 Dose Inhalation Q4H RT    sodium chloride flush  5-40 mL IntraVENous 2 times per day    enoxaparin  40 mg SubCUTAneous Daily    arformoterol tartrate  15 mcg Nebulization BID RT       Diet:   ADULT DIET; Regular     EXAM:  General: No distress. Alert.  Eyes: PERRL. No sclera icterus. No conjunctival injection.  ENT:  Result   1. There is no pulmonary embolus   2. Advanced emphysematous changes with formation of multiple blebs and bulla   3. New infiltrate seen within the left lower lobe laterally.   4. Chronic pleuroparenchymal scarring seen within the right lung base.   .                     Assessment:  New left lower lobe lateral segment infiltrate. Principal concern is that of Pseudomonas.  This would be not a typical presentation of MAC. Obviously, other pathogens are not excluded      Plan:  Continue antibiotics through day 7, tomorrow.  Likely discharge then  Vest treatments every 8 hours, he has a vest at home  Aerosol treatments, he has a nebulizer at home.  Continue weaning oxygen to keep saturation 88%-92%, he has oxygen at home.        Time at the bedside, reviewing labs and radiographs, reviewing updated notes and consultations, discussing with staff and family was more than 35 minutes.    Please note that voice recognition technology was used in the preparation of this note and make therefore it may contain inadvertent transcription errors.  If the patient is a COVID 19 isolation patient, the above physical exam reflects that of the examining physician for the day.        Rolf Little MD  Attestation    The patient was seen and personally examined.  History is obtained through the patient by myself.  Impression and plan are mine.    Documentation only provided per the nurse practitioner.    Additions and corrections are reflected in the signed note.    Rolf Little MD,  M.D., F.C.C.P.  [facility]    [facility], [address] [place] [address], [address], [place] [address]  Office visits:  [address] [place] [address]

## 2024-04-05 NOTE — PLAN OF CARE
Problem: ABCDS Injury Assessment  Goal: Absence of physical injury  4/4/2024 2247 by Airam Ashton RN  Outcome: Progressing  4/4/2024 1148 by Esme Rangel RN  Outcome: Progressing     Problem: Discharge Planning  Goal: Discharge to home or other facility with appropriate resources  4/4/2024 2247 by Airam Ashton RN  Outcome: Progressing  4/4/2024 1148 by Esme Rangel RN  Outcome: Progressing     Problem: Safety - Adult  Goal: Free from fall injury  4/4/2024 2247 by Airam Ashton RN  Outcome: Progressing  4/4/2024 1148 by Esme Rangel RN  Outcome: Progressing     Problem: Skin/Tissue Integrity  Goal: Absence of new skin breakdown  Description: 1.  Monitor for areas of redness and/or skin breakdown  2.  Assess vascular access sites hourly  3.  Every 4-6 hours minimum:  Change oxygen saturation probe site  4.  Every 4-6 hours:  If on nasal continuous positive airway pressure, respiratory therapy assess nares and determine need for appliance change or resting period.  4/4/2024 2247 by Airam Ashton RN  Outcome: Progressing  4/4/2024 1148 by Esme Rangel RN  Outcome: Progressing     Problem: Pain  Goal: Verbalizes/displays adequate comfort level or baseline comfort level  4/4/2024 2247 by Airam Ashton RN  Outcome: Progressing  4/4/2024 1148 by Esme Rnagel RN  Outcome: Progressing

## 2024-04-05 NOTE — CARE COORDINATION
Met with pt. Pt. Admitted for COPD exacerbation. CTA - left pneumonia. Pt. Had bronchoscopy done 4/4/24 cultures are pending. Currently on 5L O2 nc.back to baseline. Home supplier is [employer]. He has home concentrator 10L flow and an [employer] concentrator. Chest vest ordered pt. states he has one at home. Currently on IV cefepime, PO bumex, nebs. Pulmonology following.  Anticipated to discharge home pt. voices no home needs. Will continue to follow.   Charito SUAREZN, RN  Case Management

## 2024-04-06 VITALS
HEART RATE: 93 BPM | SYSTOLIC BLOOD PRESSURE: 97 MMHG | HEIGHT: 72 IN | BODY MASS INDEX: 24.26 KG/M2 | RESPIRATION RATE: 16 BRPM | OXYGEN SATURATION: 96 % | DIASTOLIC BLOOD PRESSURE: 74 MMHG | TEMPERATURE: 97.3 F | WEIGHT: 179.1 LBS

## 2024-04-06 PROCEDURE — 6360000002 HC RX W HCPCS: Performed by: STUDENT IN AN ORGANIZED HEALTH CARE EDUCATION/TRAINING PROGRAM

## 2024-04-06 PROCEDURE — 2700000000 HC OXYGEN THERAPY PER DAY

## 2024-04-06 PROCEDURE — 2580000003 HC RX 258: Performed by: STUDENT IN AN ORGANIZED HEALTH CARE EDUCATION/TRAINING PROGRAM

## 2024-04-06 PROCEDURE — 6360000002 HC RX W HCPCS

## 2024-04-06 PROCEDURE — 94669 MECHANICAL CHEST WALL OSCILL: CPT

## 2024-04-06 PROCEDURE — 94640 AIRWAY INHALATION TREATMENT: CPT

## 2024-04-06 PROCEDURE — 6370000000 HC RX 637 (ALT 250 FOR IP): Performed by: INTERNAL MEDICINE

## 2024-04-06 PROCEDURE — 99239 HOSP IP/OBS DSCHRG MGMT >30: CPT | Performed by: INTERNAL MEDICINE

## 2024-04-06 PROCEDURE — 94660 CPAP INITIATION&MGMT: CPT

## 2024-04-06 RX ADMIN — IPRATROPIUM BROMIDE AND ALBUTEROL SULFATE 1 DOSE: 2.5; .5 SOLUTION RESPIRATORY (INHALATION) at 00:43

## 2024-04-06 RX ADMIN — BUDESONIDE 1000 MCG: 0.5 INHALANT RESPIRATORY (INHALATION) at 08:18

## 2024-04-06 RX ADMIN — IPRATROPIUM BROMIDE AND ALBUTEROL SULFATE 1 DOSE: 2.5; .5 SOLUTION RESPIRATORY (INHALATION) at 08:18

## 2024-04-06 RX ADMIN — ARFORMOTEROL TARTRATE 15 MCG: 15 SOLUTION RESPIRATORY (INHALATION) at 08:18

## 2024-04-06 RX ADMIN — SODIUM CHLORIDE, PRESERVATIVE FREE 10 ML: 5 INJECTION INTRAVENOUS at 08:54

## 2024-04-06 RX ADMIN — ENOXAPARIN SODIUM 40 MG: 100 INJECTION SUBCUTANEOUS at 08:54

## 2024-04-06 NOTE — DISCHARGE SUMMARY
[facility] Hospitalist Physician Discharge Summary       Juni Loyd MD  [address] [facility] [place]  [address]  [place] [address]  [phone]    Call in 2 day(s)        Activity level: As tolerated     Dispo: Home      Condition on discharge: Stable     Patient ID:  Jason Cox  [id number]  73 y.o.  1950    Admit date: 3/28/2024    Discharge date and time:  4/6/2024  1:42 PM    Admission Diagnoses: Principal Problem:    COPD exacerbation (HCC)  Active Problems:    COPD (chronic obstructive pulmonary disease) (HCC)    Chronic respiratory failure with hypoxia (HCC)    Pulmonary hypertension (HCC)    Chronic diastolic heart failure (HCC)    Acute on chronic respiratory failure with hypoxia (HCC)    IgG deficiency (HCC)    Pneumonia of left lower lobe due to infectious organism  Resolved Problems:    * No resolved hospital problems. *      Discharge Diagnoses: Principal Problem:    COPD exacerbation (HCC)  Active Problems:    COPD (chronic obstructive pulmonary disease) (HCC)    Chronic respiratory failure with hypoxia (HCC)    Pulmonary hypertension (HCC)    Chronic diastolic heart failure (HCC)    Acute on chronic respiratory failure with hypoxia (HCC)    IgG deficiency (HCC)    Pneumonia of left lower lobe due to infectious organism  Resolved Problems:    * No resolved hospital problems. *      Consults:  IP CONSULT TO PULMONOLOGY  IP CONSULT TO IV TEAM  IP CONSULT TO IV TEAM  IP CONSULT TO IV TEAM    Procedures: None    Hospital Course:   Patient Jason Cox is a 73 y.o. presented with COPD exacerbation (HCC) [J44.1]  Pneumonia of left lower lobe due to infectious organism [J18.9]  Acute hypoxic respiratory failure (HCC) [J96.01]    73 year old male presents to the hospital with shortness of breath. Patient was seen by pulmonology for COPD exacerbation. He was treated with steroids, breathing treatments, and antibiotics. Patient is medically stable for discharge.    Discharge Exam:    General

## 2024-04-07 LAB
MICROORGANISM SPEC CULT: NORMAL
MICROORGANISM/AGENT SPEC: NORMAL
SPECIMEN DESCRIPTION: NORMAL

## 2024-04-08 ENCOUNTER — CARE COORDINATION (OUTPATIENT)
Dept: CARE COORDINATION | Age: 74
End: 2024-04-08

## 2024-04-08 NOTE — CARE COORDINATION
Care Transitions Initial Follow Up Call    Call within 2 business days of discharge: Yes    Patient Current Location:  Home: [address] [place] [address]    Care Transition Nurse contacted the patient by telephone to perform post hospital discharge assessment. Verified name and  with patient as identifiers. Provided introduction to self, and explanation of the Care Transition Nurse role.     Patient: Jason Cox Patient : 1950   MRN: [id number]  Reason for Admission: COPD exacerbation  Discharge Date: 24 RARS: Readmission Risk Score: 18.9      Last Discharge Facility       Date Complaint Diagnosis Description Type Department Provider    3/28/24 Shortness of Breath Acute hypoxic respiratory failure (HCC) ... ED to Hosp-Admission (Discharged) (ADMITTED) [facility] 6W Lisa Padilla DO; Nanette Ramos...            Was this an external facility discharge? No Discharge Facility: [facility]    Challenges to be reviewed by the provider   Additional needs identified to be addressed with provider: No  none               Method of communication with provider: none.    Spoke with patient today 24 for initial MARYBETH/hospital discharge follow up. Confirmed patient was admitted to [facility] for COPD exacerbation.     Patient states she is feeling better since discharge and admits he just got back from taking his [place] to the vet. States breathing is back to baseline and wearing home oxygen at 5 lpm continuous.  States O2 sat while on phone with this CTN is 92% with home oxygen.     Patient denies being a current maker admitting he quit in 2016 after smoking 3 ppd of cigarettes. CTN commended patient for smoking cessation and for making such a lifestyle change in improving his overall health.     Patient states he is getting relief from aerosols and feels s/p bronchoscopy with washing helped. Patient states he follows with Dr. Wan for pulmonology and confirmed he is scheduled to f/u with Dea Cason,

## 2024-04-13 LAB
MICROORGANISM SPEC CULT: NORMAL
MICROORGANISM/AGENT SPEC: NORMAL
SPECIMEN DESCRIPTION: NORMAL

## 2024-04-14 LAB
MICROORGANISM SPEC CULT: NORMAL
MICROORGANISM/AGENT SPEC: NORMAL
SPECIMEN DESCRIPTION: NORMAL

## 2024-04-18 LAB
MICROORGANISM SPEC CULT: ABNORMAL
MICROORGANISM SPEC CULT: ABNORMAL
MICROORGANISM/AGENT SPEC: ABNORMAL
SPECIMEN DESCRIPTION: ABNORMAL

## 2024-04-20 LAB
MICROORGANISM SPEC CULT: NORMAL
MICROORGANISM/AGENT SPEC: NORMAL
SPECIMEN DESCRIPTION: NORMAL

## 2024-04-25 ENCOUNTER — CARE COORDINATION (OUTPATIENT)
Dept: CARE COORDINATION | Age: 74
End: 2024-04-25

## 2024-04-25 NOTE — CARE COORDINATION
Care Transitions Follow Up Call    Patient: Jason Cox  Patient : 1950   MRN: [id number]  Reason for Admission: COPD exacerbation  Discharge Date: 24 RARS: Readmission Risk Score: 18.9    Attempted to contact patient today 24 for MARYBETH/hospital discharge follow up sub call. Left message requesting a return call back to CTN and provided contact information.     Noted patient completed f/u appt with pulmonology on 4/10/24. CTN will continue to follow.     Desiree Acevedo, APRN

## 2024-04-27 LAB
MICROORGANISM SPEC CULT: NORMAL
MICROORGANISM/AGENT SPEC: NORMAL
SPECIMEN DESCRIPTION: NORMAL

## 2024-04-28 ENCOUNTER — HOSPITAL ENCOUNTER (INPATIENT)
Age: 74
LOS: 8 days | Discharge: HOME | DRG: 166 | End: 2024-05-06
Attending: STUDENT IN AN ORGANIZED HEALTH CARE EDUCATION/TRAINING PROGRAM | Admitting: STUDENT IN AN ORGANIZED HEALTH CARE EDUCATION/TRAINING PROGRAM
Payer: MEDICARE

## 2024-04-28 ENCOUNTER — APPOINTMENT (OUTPATIENT)
Dept: CT IMAGING | Age: 74
DRG: 166 | End: 2024-04-28
Payer: MEDICARE

## 2024-04-28 ENCOUNTER — APPOINTMENT (OUTPATIENT)
Dept: GENERAL RADIOLOGY | Age: 74
DRG: 166 | End: 2024-04-28
Payer: MEDICARE

## 2024-04-28 DIAGNOSIS — J44.1 COPD EXACERBATION (HCC): ICD-10-CM

## 2024-04-28 DIAGNOSIS — J96.01 ACUTE RESPIRATORY FAILURE WITH HYPOXIA (HCC): Primary | ICD-10-CM

## 2024-04-28 DIAGNOSIS — J18.9 PNEUMONIA DUE TO INFECTIOUS ORGANISM, UNSPECIFIED LATERALITY, UNSPECIFIED PART OF LUNG: ICD-10-CM

## 2024-04-28 DIAGNOSIS — R91.8 LEFT LOWER LOBE PULMONARY INFILTRATE: ICD-10-CM

## 2024-04-28 LAB
ALBUMIN SERPL-MCNC: 4.5 G/DL (ref 3.5–5.2)
ALP SERPL-CCNC: 71 U/L (ref 40–129)
ALT SERPL-CCNC: 12 U/L (ref 0–40)
ANION GAP SERPL CALCULATED.3IONS-SCNC: 14 MMOL/L (ref 7–16)
AST SERPL-CCNC: 20 U/L (ref 0–39)
B PARAP IS1001 DNA NPH QL NAA+NON-PROBE: NOT DETECTED
B PERT DNA SPEC QL NAA+PROBE: NOT DETECTED
B.E.: -2.3 MMOL/L (ref -3–3)
BASOPHILS # BLD: 0.02 K/UL (ref 0–0.2)
BASOPHILS NFR BLD: 0 % (ref 0–2)
BILIRUB SERPL-MCNC: 0.7 MG/DL (ref 0–1.2)
BNP SERPL-MCNC: 3571 PG/ML (ref 0–125)
BUN SERPL-MCNC: 9 MG/DL (ref 6–23)
C PNEUM DNA NPH QL NAA+NON-PROBE: NOT DETECTED
CALCIUM SERPL-MCNC: 10.1 MG/DL (ref 8.6–10.2)
CHLORIDE SERPL-SCNC: 103 MMOL/L (ref 98–107)
CO2 SERPL-SCNC: 24 MMOL/L (ref 22–29)
COHB: 1.8 % (ref 0–1.5)
CREAT SERPL-MCNC: 0.9 MG/DL (ref 0.7–1.2)
CRITICAL: ABNORMAL
DATE ANALYZED: ABNORMAL
DATE OF COLLECTION: ABNORMAL
EOSINOPHIL # BLD: 0.08 K/UL (ref 0.05–0.5)
EOSINOPHILS RELATIVE PERCENT: 1 % (ref 0–6)
ERYTHROCYTE [DISTWIDTH] IN BLOOD BY AUTOMATED COUNT: 14.1 % (ref 11.5–15)
FLUAV RNA NPH QL NAA+NON-PROBE: NOT DETECTED
FLUBV RNA NPH QL NAA+NON-PROBE: NOT DETECTED
GFR SERPL CREATININE-BSD FRML MDRD: >90 ML/MIN/1.73M2
GLUCOSE SERPL-MCNC: 89 MG/DL (ref 74–99)
HADV DNA NPH QL NAA+NON-PROBE: NOT DETECTED
HCO3: 20.7 MMOL/L (ref 22–26)
HCOV 229E RNA NPH QL NAA+NON-PROBE: NOT DETECTED
HCOV HKU1 RNA NPH QL NAA+NON-PROBE: NOT DETECTED
HCOV NL63 RNA NPH QL NAA+NON-PROBE: DETECTED
HCOV OC43 RNA NPH QL NAA+NON-PROBE: NOT DETECTED
HCT VFR BLD AUTO: 51.9 % (ref 37–54)
HGB BLD-MCNC: 17 G/DL (ref 12.5–16.5)
HHB: 10.6 % (ref 0–5)
HMPV RNA NPH QL NAA+NON-PROBE: NOT DETECTED
HPIV1 RNA NPH QL NAA+NON-PROBE: NOT DETECTED
HPIV2 RNA NPH QL NAA+NON-PROBE: NOT DETECTED
HPIV3 RNA NPH QL NAA+NON-PROBE: NOT DETECTED
HPIV4 RNA NPH QL NAA+NON-PROBE: NOT DETECTED
IMM GRANULOCYTES # BLD AUTO: 0.03 K/UL (ref 0–0.58)
IMM GRANULOCYTES NFR BLD: 0 % (ref 0–5)
INFLUENZA A BY PCR: NOT DETECTED
INFLUENZA B BY PCR: NOT DETECTED
LAB: ABNORMAL
LYMPHOCYTES NFR BLD: 0.89 K/UL (ref 1.5–4)
LYMPHOCYTES RELATIVE PERCENT: 10 % (ref 20–42)
Lab: 1217
M PNEUMO DNA NPH QL NAA+NON-PROBE: NOT DETECTED
MCH RBC QN AUTO: 31.4 PG (ref 26–35)
MCHC RBC AUTO-ENTMCNC: 32.8 G/DL (ref 32–34.5)
MCV RBC AUTO: 95.9 FL (ref 80–99.9)
METHB: 0.3 % (ref 0–1.5)
MODE: ABNORMAL
MONOCYTES NFR BLD: 0.39 K/UL (ref 0.1–0.95)
MONOCYTES NFR BLD: 5 % (ref 2–12)
NEUTROPHILS NFR BLD: 84 % (ref 43–80)
NEUTS SEG NFR BLD: 7.2 K/UL (ref 1.8–7.3)
O2 CONTENT: 19.6 ML/DL
O2 SATURATION: 89.2 % (ref 92–98.5)
O2HB: 87.3 % (ref 94–97)
OPERATOR ID: ABNORMAL
PATIENT TEMP: 37 C
PCO2: 31.5 MMHG (ref 35–45)
PH BLOOD GAS: 7.44 (ref 7.35–7.45)
PLATELET # BLD AUTO: 242 K/UL (ref 130–450)
PMV BLD AUTO: 10 FL (ref 7–12)
PO2: 55 MMHG (ref 75–100)
POTASSIUM SERPL-SCNC: 4 MMOL/L (ref 3.5–5)
PROT SERPL-MCNC: 7.2 G/DL (ref 6.4–8.3)
RBC # BLD AUTO: 5.41 M/UL (ref 3.8–5.8)
RSV RNA NPH QL NAA+NON-PROBE: NOT DETECTED
RV+EV RNA NPH QL NAA+NON-PROBE: NOT DETECTED
SARS-COV-2 RDRP RESP QL NAA+PROBE: NOT DETECTED
SARS-COV-2 RNA NPH QL NAA+NON-PROBE: NOT DETECTED
SODIUM SERPL-SCNC: 141 MMOL/L (ref 132–146)
SOURCE, BLOOD GAS: ABNORMAL
SPECIMEN DESCRIPTION: ABNORMAL
SPECIMEN DESCRIPTION: NORMAL
THB: 16 G/DL (ref 11.5–16.5)
TIME ANALYZED: 1227
TROPONIN I SERPL HS-MCNC: 22 NG/L (ref 0–11)
WBC OTHER # BLD: 8.6 K/UL (ref 4.5–11.5)

## 2024-04-28 PROCEDURE — 71275 CT ANGIOGRAPHY CHEST: CPT

## 2024-04-28 PROCEDURE — 87635 SARS-COV-2 COVID-19 AMP PRB: CPT

## 2024-04-28 PROCEDURE — 82805 BLOOD GASES W/O2 SATURATION: CPT

## 2024-04-28 PROCEDURE — 96375 TX/PRO/DX INJ NEW DRUG ADDON: CPT

## 2024-04-28 PROCEDURE — 2580000003 HC RX 258: Performed by: STUDENT IN AN ORGANIZED HEALTH CARE EDUCATION/TRAINING PROGRAM

## 2024-04-28 PROCEDURE — 6370000000 HC RX 637 (ALT 250 FOR IP): Performed by: STUDENT IN AN ORGANIZED HEALTH CARE EDUCATION/TRAINING PROGRAM

## 2024-04-28 PROCEDURE — 87502 INFLUENZA DNA AMP PROBE: CPT

## 2024-04-28 PROCEDURE — 93005 ELECTROCARDIOGRAM TRACING: CPT | Performed by: STUDENT IN AN ORGANIZED HEALTH CARE EDUCATION/TRAINING PROGRAM

## 2024-04-28 PROCEDURE — 71045 X-RAY EXAM CHEST 1 VIEW: CPT

## 2024-04-28 PROCEDURE — 6360000002 HC RX W HCPCS: Performed by: STUDENT IN AN ORGANIZED HEALTH CARE EDUCATION/TRAINING PROGRAM

## 2024-04-28 PROCEDURE — APPSS45 APP SPLIT SHARED TIME 31-45 MINUTES: Performed by: NURSE PRACTITIONER

## 2024-04-28 PROCEDURE — 6360000004 HC RX CONTRAST MEDICATION: Performed by: RADIOLOGY

## 2024-04-28 PROCEDURE — 2060000000 HC ICU INTERMEDIATE R&B

## 2024-04-28 PROCEDURE — 80053 COMPREHEN METABOLIC PANEL: CPT

## 2024-04-28 PROCEDURE — 2700000000 HC OXYGEN THERAPY PER DAY

## 2024-04-28 PROCEDURE — 99222 1ST HOSP IP/OBS MODERATE 55: CPT | Performed by: STUDENT IN AN ORGANIZED HEALTH CARE EDUCATION/TRAINING PROGRAM

## 2024-04-28 PROCEDURE — 94640 AIRWAY INHALATION TREATMENT: CPT

## 2024-04-28 PROCEDURE — 84484 ASSAY OF TROPONIN QUANT: CPT

## 2024-04-28 PROCEDURE — 2500000003 HC RX 250 WO HCPCS: Performed by: STUDENT IN AN ORGANIZED HEALTH CARE EDUCATION/TRAINING PROGRAM

## 2024-04-28 PROCEDURE — 0202U NFCT DS 22 TRGT SARS-COV-2: CPT

## 2024-04-28 PROCEDURE — 94664 DEMO&/EVAL PT USE INHALER: CPT

## 2024-04-28 PROCEDURE — 85025 COMPLETE CBC W/AUTO DIFF WBC: CPT

## 2024-04-28 PROCEDURE — 99285 EMERGENCY DEPT VISIT HI MDM: CPT

## 2024-04-28 PROCEDURE — 96365 THER/PROPH/DIAG IV INF INIT: CPT

## 2024-04-28 PROCEDURE — 83880 ASSAY OF NATRIURETIC PEPTIDE: CPT

## 2024-04-28 RX ORDER — AZITHROMYCIN 250 MG/1
500 TABLET, FILM COATED ORAL
Status: DISCONTINUED | OUTPATIENT
Start: 2024-04-29 | End: 2024-05-06 | Stop reason: HOSPADM

## 2024-04-28 RX ORDER — OXYCODONE HYDROCHLORIDE 5 MG/1
2.5 TABLET ORAL 2 TIMES DAILY PRN
Status: DISCONTINUED | OUTPATIENT
Start: 2024-04-28 | End: 2024-05-06 | Stop reason: HOSPADM

## 2024-04-28 RX ORDER — METHYLPREDNISOLONE SODIUM SUCCINATE 125 MG/2ML
125 INJECTION, POWDER, LYOPHILIZED, FOR SOLUTION INTRAMUSCULAR; INTRAVENOUS ONCE
Status: COMPLETED | OUTPATIENT
Start: 2024-04-28 | End: 2024-04-28

## 2024-04-28 RX ORDER — SODIUM CHLORIDE 0.9 % (FLUSH) 0.9 %
5-40 SYRINGE (ML) INJECTION EVERY 12 HOURS SCHEDULED
Status: DISCONTINUED | OUTPATIENT
Start: 2024-04-28 | End: 2024-05-06 | Stop reason: HOSPADM

## 2024-04-28 RX ORDER — ONDANSETRON 2 MG/ML
4 INJECTION INTRAMUSCULAR; INTRAVENOUS EVERY 6 HOURS PRN
Status: DISCONTINUED | OUTPATIENT
Start: 2024-04-28 | End: 2024-05-06 | Stop reason: HOSPADM

## 2024-04-28 RX ORDER — MAGNESIUM SULFATE IN WATER 40 MG/ML
2000 INJECTION, SOLUTION INTRAVENOUS ONCE
Status: COMPLETED | OUTPATIENT
Start: 2024-04-28 | End: 2024-04-28

## 2024-04-28 RX ORDER — ONDANSETRON 4 MG/1
4 TABLET, ORALLY DISINTEGRATING ORAL EVERY 8 HOURS PRN
Status: DISCONTINUED | OUTPATIENT
Start: 2024-04-28 | End: 2024-05-06 | Stop reason: HOSPADM

## 2024-04-28 RX ORDER — IPRATROPIUM BROMIDE AND ALBUTEROL SULFATE 2.5; .5 MG/3ML; MG/3ML
3 SOLUTION RESPIRATORY (INHALATION) ONCE
Status: COMPLETED | OUTPATIENT
Start: 2024-04-28 | End: 2024-04-28

## 2024-04-28 RX ORDER — POTASSIUM CHLORIDE 20 MEQ/1
40 TABLET, EXTENDED RELEASE ORAL PRN
Status: DISCONTINUED | OUTPATIENT
Start: 2024-04-28 | End: 2024-05-06 | Stop reason: HOSPADM

## 2024-04-28 RX ORDER — MAGNESIUM SULFATE IN WATER 40 MG/ML
2000 INJECTION, SOLUTION INTRAVENOUS PRN
Status: DISCONTINUED | OUTPATIENT
Start: 2024-04-28 | End: 2024-05-06 | Stop reason: HOSPADM

## 2024-04-28 RX ORDER — POTASSIUM CHLORIDE 7.45 MG/ML
10 INJECTION INTRAVENOUS PRN
Status: DISCONTINUED | OUTPATIENT
Start: 2024-04-28 | End: 2024-05-06 | Stop reason: HOSPADM

## 2024-04-28 RX ORDER — ENOXAPARIN SODIUM 100 MG/ML
40 INJECTION SUBCUTANEOUS DAILY
Status: DISCONTINUED | OUTPATIENT
Start: 2024-04-28 | End: 2024-05-06 | Stop reason: HOSPADM

## 2024-04-28 RX ORDER — ACETAMINOPHEN 650 MG/1
650 SUPPOSITORY RECTAL EVERY 6 HOURS PRN
Status: DISCONTINUED | OUTPATIENT
Start: 2024-04-28 | End: 2024-05-06 | Stop reason: HOSPADM

## 2024-04-28 RX ORDER — SODIUM CHLORIDE 9 MG/ML
INJECTION, SOLUTION INTRAVENOUS PRN
Status: DISCONTINUED | OUTPATIENT
Start: 2024-04-28 | End: 2024-05-06 | Stop reason: HOSPADM

## 2024-04-28 RX ORDER — POLYETHYLENE GLYCOL 3350 17 G/17G
17 POWDER, FOR SOLUTION ORAL DAILY PRN
Status: DISCONTINUED | OUTPATIENT
Start: 2024-04-28 | End: 2024-05-06 | Stop reason: HOSPADM

## 2024-04-28 RX ORDER — BUMETANIDE 1 MG/1
1 TABLET ORAL
Status: DISCONTINUED | OUTPATIENT
Start: 2024-04-29 | End: 2024-05-06 | Stop reason: HOSPADM

## 2024-04-28 RX ORDER — ACETAMINOPHEN 325 MG/1
650 TABLET ORAL EVERY 6 HOURS PRN
Status: DISCONTINUED | OUTPATIENT
Start: 2024-04-28 | End: 2024-05-06 | Stop reason: HOSPADM

## 2024-04-28 RX ORDER — LANOLIN ALCOHOL/MO/W.PET/CERES
3 CREAM (GRAM) TOPICAL NIGHTLY PRN
Status: DISCONTINUED | OUTPATIENT
Start: 2024-04-28 | End: 2024-05-06 | Stop reason: HOSPADM

## 2024-04-28 RX ORDER — IPRATROPIUM BROMIDE AND ALBUTEROL SULFATE 2.5; .5 MG/3ML; MG/3ML
1 SOLUTION RESPIRATORY (INHALATION) EVERY 4 HOURS PRN
Status: DISCONTINUED | OUTPATIENT
Start: 2024-04-28 | End: 2024-05-06 | Stop reason: HOSPADM

## 2024-04-28 RX ORDER — OXYCODONE HYDROCHLORIDE AND ACETAMINOPHEN 5; 325 MG/1; MG/1
1 TABLET ORAL 2 TIMES DAILY PRN
Status: DISCONTINUED | OUTPATIENT
Start: 2024-04-28 | End: 2024-05-06 | Stop reason: HOSPADM

## 2024-04-28 RX ORDER — ARFORMOTEROL TARTRATE 15 UG/2ML
15 SOLUTION RESPIRATORY (INHALATION)
Status: DISCONTINUED | OUTPATIENT
Start: 2024-04-28 | End: 2024-05-06 | Stop reason: HOSPADM

## 2024-04-28 RX ORDER — MIDODRINE HYDROCHLORIDE 2.5 MG/1
2.5 TABLET ORAL 3 TIMES DAILY
Status: DISCONTINUED | OUTPATIENT
Start: 2024-04-28 | End: 2024-05-06 | Stop reason: HOSPADM

## 2024-04-28 RX ORDER — OXYCODONE AND ACETAMINOPHEN 7.5; 325 MG/1; MG/1
1 TABLET ORAL 2 TIMES DAILY PRN
Status: DISCONTINUED | OUTPATIENT
Start: 2024-04-28 | End: 2024-04-28 | Stop reason: CLARIF

## 2024-04-28 RX ORDER — METHYLPREDNISOLONE SODIUM SUCCINATE 40 MG/ML
40 INJECTION, POWDER, LYOPHILIZED, FOR SOLUTION INTRAMUSCULAR; INTRAVENOUS EVERY 12 HOURS
Status: COMPLETED | OUTPATIENT
Start: 2024-04-29 | End: 2024-04-30

## 2024-04-28 RX ORDER — SODIUM CHLORIDE 0.9 % (FLUSH) 0.9 %
5-40 SYRINGE (ML) INJECTION PRN
Status: DISCONTINUED | OUTPATIENT
Start: 2024-04-28 | End: 2024-05-06 | Stop reason: HOSPADM

## 2024-04-28 RX ORDER — BUDESONIDE 0.5 MG/2ML
500 INHALANT ORAL
Status: DISCONTINUED | OUTPATIENT
Start: 2024-04-28 | End: 2024-05-06 | Stop reason: HOSPADM

## 2024-04-28 RX ADMIN — DOXYCYCLINE 100 MG: 100 INJECTION, POWDER, LYOPHILIZED, FOR SOLUTION INTRAVENOUS at 13:41

## 2024-04-28 RX ADMIN — MIDODRINE HYDROCHLORIDE 2.5 MG: 2.5 TABLET ORAL at 20:07

## 2024-04-28 RX ADMIN — IOPAMIDOL 75 ML: 755 INJECTION, SOLUTION INTRAVENOUS at 12:57

## 2024-04-28 RX ADMIN — Medication 3 MG: at 20:07

## 2024-04-28 RX ADMIN — BUDESONIDE INHALATION 500 MCG: 0.5 SUSPENSION RESPIRATORY (INHALATION) at 19:26

## 2024-04-28 RX ADMIN — IPRATROPIUM BROMIDE AND ALBUTEROL SULFATE 3 DOSE: 2.5; .5 SOLUTION RESPIRATORY (INHALATION) at 12:03

## 2024-04-28 RX ADMIN — MAGNESIUM SULFATE HEPTAHYDRATE 2000 MG: 40 INJECTION, SOLUTION INTRAVENOUS at 12:09

## 2024-04-28 RX ADMIN — ENOXAPARIN SODIUM 40 MG: 100 INJECTION SUBCUTANEOUS at 17:07

## 2024-04-28 RX ADMIN — ARFORMOTEROL TARTRATE 15 MCG: 15 SOLUTION RESPIRATORY (INHALATION) at 19:26

## 2024-04-28 RX ADMIN — METHYLPREDNISOLONE SODIUM SUCCINATE 125 MG: 125 INJECTION INTRAMUSCULAR; INTRAVENOUS at 12:05

## 2024-04-28 RX ADMIN — CEFTRIAXONE SODIUM 2000 MG: 2 INJECTION, POWDER, FOR SOLUTION INTRAMUSCULAR; INTRAVENOUS at 13:34

## 2024-04-28 NOTE — H&P
[facility] Hospitalist Group   History and Physical      CHIEF COMPLAINT: SOB    History of Present Illness:  73 y.o. male with a history of HFpEF, COPD, thoracic ascending aortic aneurysm, pulmonary hypertension presents with increased shortness of breath over the last week.  Progressively worsening.  Patient notes cough with dark-colored sputum.  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  He followed with ID. Last  seen in February.States he was recently in to see Pulmonary.  She placed  him on Steroid taper which he completed  about 1 week prior to presentation.  Chronically wears 6L NC at home, however required to increased Oxygen to 8L NC.Patient received ceftriaxone 2000 mg IV, doxycycline 100 mg IV, DuoNeb, magnesium sulfate 2000 mg IV, Solu-Medrol 125 mg IV in ED course.  Decision to admit patient for further evaluation and treatment.    3/28-4/6 presented with COPD exacerbation left lower lobe pneumonia.  He was seen by pulmonology.  Received steroids/nebs/ABX.  He was discharged home with follow-up    Informant(s) for H&P: Patient and EMR    REVIEW OF SYSTEMS:  Admits to cough and SOB.  Denies  fevers, chills, cp,n/v, ha, vision/hearing changes, wt changes, hot/cold flashes, other open skin lesions, diarrhea, constipation, dysuria/hematuria unless noted in HPI. Complete ROS performed with the patient and is otherwise negative.      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

## 2024-04-28 NOTE — ED PROVIDER NOTES
Source: Blood Arterial   pH, Blood Gas 7.436   PCO2 31.5(!)   pO2 55.0(!)   HCO3 20.7(!)   Base Excess -2.3   O2 Sat 89.2(!)   O2Hb 87.3(!)   Carboxy-Hgb 1.8(!)   METHB,METHB 0.3   O2 Content 19.6   HHb 10.6(!)   THB,THB 16.0   Mode HFNC 10 L   Date Of Collection 20240428   Time Collected 1217   Pt Temp 37.0    ID [id number]  00     Lab [id number]   Critical(s) Notified . No Critical Values [PW]   1244 Brain Natriuretic Peptide(!):    Pro-BNP 3,571(!) [PW]   1245 Troponin(!):    Troponin, High Sensitivity 22(!) [PW]   1352 CTA PULMONARY W CONTRAST  IMPRESSION:  1. There is no pulmonary embolus  2. Advanced centrilobular emphysematous changes  3. Left lower lobe pneumonia  4. Multiple areas of chronic pleuroparenchymal scarring seen within the right  and left lungs more prominent within the right lung base laterally.   [PW]   1353 Spoke with Dr. Moser, will admit for further.  [PW]      ED Course User Index  [PW] Nii Green,           Medications given include:  Medications   methylPREDNISolone sodium succ (SOLU-MEDROL) injection 40 mg (has no administration in time range)   cefTRIAXone (ROCEPHIN) 1,000 mg in sterile water 10 mL IV syringe (has no administration in time range)   doxycycline (VIBRAMYCIN) 100 mg in sodium chloride 0.9 % 100 mL IVPB (has no administration in time range)   sodium chloride flush 0.9 % injection 5-40 mL (has no administration in time range)   sodium chloride flush 0.9 % injection 5-40 mL (has no administration in time range)   0.9 % sodium chloride infusion (has no administration in time range)   potassium chloride (KLOR-CON M) extended release tablet 40 mEq (has no administration in time range)     Or   potassium bicarb-citric acid (EFFER-K) effervescent tablet 40 mEq (has no administration in time range)     Or   potassium chloride 10 mEq/100 mL IVPB (Peripheral Line) (has no administration in time range)   magnesium sulfate 2000 mg in 50 mL IVPB premix (has no  0.3 0.0 - 1.5 %    O2 Content 19.6 mL/dL    HHb 10.6 (H) 0.0 - 5.0 %    tHb (est) 16.0 11.5 - 16.5 g/dL    Mode HFNC 10 L     Date Of Collection 20240428     Time Collected 1217     Pt Temp 37.0 C     ID [id number]  00       Lab [id number]     Critical(s) Notified . No Critical Values    EKG 12 Lead   Result Value Ref Range    Ventricular Rate 81 BPM    Atrial Rate 81 BPM    P-R Interval 182 ms    QRS Duration 108 ms    Q-T Interval 416 ms    QTc Calculation (Bazett) 483 ms    P Axis 47 degrees    R Axis 13 degrees    T Axis -32 degrees         Radiology:   Non-plain film images such as CT, Ultrasound and MRI are read by the radiologist. Plain radiographic images are visualized and preliminarily interpreted by the ED Provider in the MDM section.    Interpretation per the Radiologist below, if available at the time of this note:    CTA PULMONARY W CONTRAST   Final Result   1. There is no pulmonary embolus   2. Advanced centrilobular emphysematous changes   3. Left lower lobe pneumonia   4. Multiple areas of chronic pleuroparenchymal scarring seen within the right   and left lungs more prominent within the right lung base laterally.         XR CHEST PORTABLE   Final Result   1. Bibasilar airspace disease   2. Small right pleural effusion   3. Possible atelectasis within the right lung base   4. Emphysematous changes   5. CT of the chest is pending.           No results found.    No results found.    ------------------- NURSING NOTES & VITALS -------------------    The nursing notes within the ED encounter and vital signs were reviewed.     Vitals:    04/28/24 1502   BP: (!) 156/92   Pulse: 88   Resp: 22   Temp: 98 °F (36.7 °C)   SpO2: (!) 89%        Patient Vitals for the past 8 hrs:   BP Temp Temp src Pulse Resp SpO2 Height Weight   04/28/24 1502 (!) 156/92 98 °F (36.7 °C) Oral 88 22 (!) 89 % -- --   04/28/24 1438 (!) 143/82 97.7 °F (36.5 °C) Oral 81 18 (!) 88 % -- --   04/28/24 1257 111/64 -- -- 90 13 92 % -- --

## 2024-04-28 NOTE — ED NOTES
ED to Inpatient Handoff Report    Notified [facility] that electronic handoff available and patient ready for transport to room 603.    Safety Risks: Risk of falls    Patient in Restraints: no    Constant Observer or Patient : no    Telemetry Monitoring Ordered :Yes      Cardiac Rhythm: Sinus rhythm    Order to transfer to unit without monitor:NO    Last MEWS: 1 Time completed: 1438    Deterioration Index Score:   Predictive Model Details          33 (Caution)  Factor Value    Calculated 4/28/2024 14:43 35% Age 73 years old    Deterioration Index Model 33% Supplemental oxygen High flow nasal cannula     14% Pulse oximetry 88 %     7% Respiratory rate 18     5% Systolic 143     2% Blood pH 7.436     2% Sodium 141 mmol/L     1% WBC count 8.6 k/uL     1% Hematocrit 51.9 %     0% Pulse 81     0% Potassium 4.0 mmol/L     0% Temperature 97.7 °F (36.5 °C)        Vitals:    04/28/24 1155 04/28/24 1224 04/28/24 1257 04/28/24 1438   BP: 121/83 125/76 111/64 (!) 143/82   Pulse: 81 79 90 81   Resp: 22 18 13 18   Temp: 98 °F (36.7 °C)   97.7 °F (36.5 °C)   TempSrc:    Oral   SpO2: (!) 76% 95% 92% (!) 88%   Weight: 82.6 kg (182 lb)      Height: 1.829 m (6')            Opportunity for questions and clarification was provided.       back pain general

## 2024-04-28 NOTE — PROGRESS NOTES
4 Eyes Skin Assessment     NAME:  Jason Cox  YOB: 1950  MEDICAL RECORD NUMBER:  [id number]    The patient is being assessed for  Admission    I agree that at least one RN has performed a thorough Head to Toe Skin Assessment on the patient. ALL assessment sites listed below have been assessed.      Areas assessed by both nurses:    Head, Face, Ears, Shoulders, Back, Chest, Arms, Elbows, Hands, Sacrum. Buttock, Coccyx, Ischium, Legs. Feet and Heels, and Under Medical Devices         Does the Patient have a Wound? No noted wound(s)       Sandro Prevention initiated by RN: No  Wound Care Orders initiated by RN: No    Pressure Injury (Stage 3,4, Unstageable, DTI, NWPT, and Complex wounds) if present, place Wound referral order by RN under : No    New Ostomies, if present place, Ostomy referral order under : No     Nurse 1 eSignature: Electronically signed by Nannette Zavaleta RN on 4/28/24 at 6:10 PM EDT    **SHARE this note so that the co-signing nurse can place an eSignature**    Nurse 2 eSignature: Electronically signed by Maite Valdovinos RN on 4/28/24 at 6:12 PM EDT

## 2024-04-29 ENCOUNTER — CARE COORDINATION (OUTPATIENT)
Dept: CARE COORDINATION | Age: 74
End: 2024-04-29

## 2024-04-29 LAB
ANION GAP SERPL CALCULATED.3IONS-SCNC: 14 MMOL/L (ref 7–16)
BASOPHILS # BLD: 0.01 K/UL (ref 0–0.2)
BASOPHILS NFR BLD: 0 % (ref 0–2)
BUN SERPL-MCNC: 5 MG/DL (ref 6–23)
CALCIUM SERPL-MCNC: 9.7 MG/DL (ref 8.6–10.2)
CHLORIDE SERPL-SCNC: 102 MMOL/L (ref 98–107)
CO2 SERPL-SCNC: 20 MMOL/L (ref 22–29)
CREAT SERPL-MCNC: 0.6 MG/DL (ref 0.7–1.2)
CRP SERPL HS-MCNC: 5 MG/L (ref 0–5)
EKG ATRIAL RATE: 81 BPM
EKG P AXIS: 47 DEGREES
EKG P-R INTERVAL: 182 MS
EKG Q-T INTERVAL: 416 MS
EKG QRS DURATION: 108 MS
EKG QTC CALCULATION (BAZETT): 483 MS
EKG R AXIS: 13 DEGREES
EKG T AXIS: -32 DEGREES
EKG VENTRICULAR RATE: 81 BPM
EOSINOPHIL # BLD: 0 K/UL (ref 0.05–0.5)
EOSINOPHILS RELATIVE PERCENT: 0 % (ref 0–6)
ERYTHROCYTE [DISTWIDTH] IN BLOOD BY AUTOMATED COUNT: 13.7 % (ref 11.5–15)
ERYTHROCYTE [SEDIMENTATION RATE] IN BLOOD BY WESTERGREN METHOD: 0 MM/HR (ref 0–15)
GFR SERPL CREATININE-BSD FRML MDRD: >90 ML/MIN/1.73M2
GLUCOSE SERPL-MCNC: 174 MG/DL (ref 74–99)
HCT VFR BLD AUTO: 48.8 % (ref 37–54)
HGB BLD-MCNC: 16.1 G/DL (ref 12.5–16.5)
IMM GRANULOCYTES # BLD AUTO: 0.04 K/UL (ref 0–0.58)
IMM GRANULOCYTES NFR BLD: 1 % (ref 0–5)
LYMPHOCYTES NFR BLD: 0.3 K/UL (ref 1.5–4)
LYMPHOCYTES RELATIVE PERCENT: 4 % (ref 20–42)
MCH RBC QN AUTO: 31.1 PG (ref 26–35)
MCHC RBC AUTO-ENTMCNC: 33 G/DL (ref 32–34.5)
MCV RBC AUTO: 94.2 FL (ref 80–99.9)
MONOCYTES NFR BLD: 0.09 K/UL (ref 0.1–0.95)
MONOCYTES NFR BLD: 1 % (ref 2–12)
NEUTROPHILS NFR BLD: 95 % (ref 43–80)
NEUTS SEG NFR BLD: 7.87 K/UL (ref 1.8–7.3)
PLATELET # BLD AUTO: 209 K/UL (ref 130–450)
PMV BLD AUTO: 10.1 FL (ref 7–12)
POTASSIUM SERPL-SCNC: 3.8 MMOL/L (ref 3.5–5)
PROCALCITONIN SERPL-MCNC: 0.04 NG/ML (ref 0–0.08)
RBC # BLD AUTO: 5.18 M/UL (ref 3.8–5.8)
RBC # BLD: NORMAL 10*6/UL
SODIUM SERPL-SCNC: 136 MMOL/L (ref 132–146)
WBC OTHER # BLD: 8.3 K/UL (ref 4.5–11.5)

## 2024-04-29 PROCEDURE — 6370000000 HC RX 637 (ALT 250 FOR IP): Performed by: STUDENT IN AN ORGANIZED HEALTH CARE EDUCATION/TRAINING PROGRAM

## 2024-04-29 PROCEDURE — 86140 C-REACTIVE PROTEIN: CPT

## 2024-04-29 PROCEDURE — 2580000003 HC RX 258: Performed by: SPECIALIST

## 2024-04-29 PROCEDURE — 6360000002 HC RX W HCPCS: Performed by: STUDENT IN AN ORGANIZED HEALTH CARE EDUCATION/TRAINING PROGRAM

## 2024-04-29 PROCEDURE — 87899 AGENT NOS ASSAY W/OPTIC: CPT

## 2024-04-29 PROCEDURE — 85652 RBC SED RATE AUTOMATED: CPT

## 2024-04-29 PROCEDURE — 2700000000 HC OXYGEN THERAPY PER DAY

## 2024-04-29 PROCEDURE — 2500000003 HC RX 250 WO HCPCS: Performed by: STUDENT IN AN ORGANIZED HEALTH CARE EDUCATION/TRAINING PROGRAM

## 2024-04-29 PROCEDURE — 36415 COLL VENOUS BLD VENIPUNCTURE: CPT

## 2024-04-29 PROCEDURE — 87449 NOS EACH ORGANISM AG IA: CPT

## 2024-04-29 PROCEDURE — 99232 SBSQ HOSP IP/OBS MODERATE 35: CPT | Performed by: STUDENT IN AN ORGANIZED HEALTH CARE EDUCATION/TRAINING PROGRAM

## 2024-04-29 PROCEDURE — 93010 ELECTROCARDIOGRAM REPORT: CPT | Performed by: INTERNAL MEDICINE

## 2024-04-29 PROCEDURE — 94640 AIRWAY INHALATION TREATMENT: CPT

## 2024-04-29 PROCEDURE — 6370000000 HC RX 637 (ALT 250 FOR IP): Performed by: INTERNAL MEDICINE

## 2024-04-29 PROCEDURE — 80048 BASIC METABOLIC PNL TOTAL CA: CPT

## 2024-04-29 PROCEDURE — 85025 COMPLETE CBC W/AUTO DIFF WBC: CPT

## 2024-04-29 PROCEDURE — 6360000002 HC RX W HCPCS: Performed by: SPECIALIST

## 2024-04-29 PROCEDURE — 2060000000 HC ICU INTERMEDIATE R&B

## 2024-04-29 PROCEDURE — 2580000003 HC RX 258: Performed by: STUDENT IN AN ORGANIZED HEALTH CARE EDUCATION/TRAINING PROGRAM

## 2024-04-29 PROCEDURE — 84145 PROCALCITONIN (PCT): CPT

## 2024-04-29 RX ORDER — IPRATROPIUM BROMIDE AND ALBUTEROL SULFATE 2.5; .5 MG/3ML; MG/3ML
1 SOLUTION RESPIRATORY (INHALATION)
Status: DISCONTINUED | OUTPATIENT
Start: 2024-04-29 | End: 2024-05-06 | Stop reason: HOSPADM

## 2024-04-29 RX ADMIN — AZITHROMYCIN 500 MG: 250 TABLET, FILM COATED ORAL at 09:03

## 2024-04-29 RX ADMIN — IPRATROPIUM BROMIDE AND ALBUTEROL SULFATE 1 DOSE: 2.5; .5 SOLUTION RESPIRATORY (INHALATION) at 19:28

## 2024-04-29 RX ADMIN — ARFORMOTEROL TARTRATE 15 MCG: 15 SOLUTION RESPIRATORY (INHALATION) at 19:28

## 2024-04-29 RX ADMIN — SODIUM CHLORIDE, PRESERVATIVE FREE 10 ML: 5 INJECTION INTRAVENOUS at 09:03

## 2024-04-29 RX ADMIN — BUDESONIDE INHALATION 500 MCG: 0.5 SUSPENSION RESPIRATORY (INHALATION) at 19:28

## 2024-04-29 RX ADMIN — CEFEPIME 2000 MG: 2 INJECTION, POWDER, FOR SOLUTION INTRAVENOUS at 18:07

## 2024-04-29 RX ADMIN — MIDODRINE HYDROCHLORIDE 2.5 MG: 2.5 TABLET ORAL at 09:03

## 2024-04-29 RX ADMIN — MIDODRINE HYDROCHLORIDE 2.5 MG: 2.5 TABLET ORAL at 20:42

## 2024-04-29 RX ADMIN — IPRATROPIUM BROMIDE AND ALBUTEROL SULFATE 1 DOSE: 2.5; .5 SOLUTION RESPIRATORY (INHALATION) at 16:50

## 2024-04-29 RX ADMIN — ARFORMOTEROL TARTRATE 15 MCG: 15 SOLUTION RESPIRATORY (INHALATION) at 07:57

## 2024-04-29 RX ADMIN — BUDESONIDE INHALATION 500 MCG: 0.5 SUSPENSION RESPIRATORY (INHALATION) at 07:57

## 2024-04-29 RX ADMIN — IPRATROPIUM BROMIDE AND ALBUTEROL SULFATE 1 DOSE: 2.5; .5 SOLUTION RESPIRATORY (INHALATION) at 07:58

## 2024-04-29 RX ADMIN — IPRATROPIUM BROMIDE AND ALBUTEROL SULFATE 1 DOSE: 2.5; .5 SOLUTION RESPIRATORY (INHALATION) at 23:58

## 2024-04-29 RX ADMIN — METHYLPREDNISOLONE SODIUM SUCCINATE 40 MG: 40 INJECTION INTRAMUSCULAR; INTRAVENOUS at 04:51

## 2024-04-29 RX ADMIN — ENOXAPARIN SODIUM 40 MG: 100 INJECTION SUBCUTANEOUS at 09:03

## 2024-04-29 RX ADMIN — WATER 1000 MG: 1 INJECTION INTRAMUSCULAR; INTRAVENOUS; SUBCUTANEOUS at 14:21

## 2024-04-29 RX ADMIN — METHYLPREDNISOLONE SODIUM SUCCINATE 40 MG: 40 INJECTION INTRAMUSCULAR; INTRAVENOUS at 18:03

## 2024-04-29 RX ADMIN — DOXYCYCLINE 100 MG: 100 INJECTION, POWDER, LYOPHILIZED, FOR SOLUTION INTRAVENOUS at 04:51

## 2024-04-29 RX ADMIN — MIDODRINE HYDROCHLORIDE 2.5 MG: 2.5 TABLET ORAL at 14:21

## 2024-04-29 RX ADMIN — SODIUM CHLORIDE, PRESERVATIVE FREE 10 ML: 5 INJECTION INTRAVENOUS at 20:42

## 2024-04-29 RX ADMIN — BUMETANIDE 1 MG: 1 TABLET ORAL at 20:42

## 2024-04-29 ASSESSMENT — PAIN SCALES - GENERAL
PAINLEVEL_OUTOF10: 0

## 2024-04-29 NOTE — PROGRESS NOTES
oriented to person, place and time and in NAD, sitting in bed  Skin: warm and dry  Head: normocephalic and atraumatic  Eyes: PERRL, EOMI, conjunctivae normal  Neck: neck supple, trachea midline   Pulmonary/Chest: diffuse wheezing, 10L HFNC  Abdomen: soft, non-tender, non-distended, normal bowel sounds, no masses or organomegaly  Extremities: no cyanosis, no clubbing and no edema  Neurologic: no cranial nerve deficit and speech normal      Recent Labs     04/28/24  1208 04/29/24  0740    136   K 4.0 3.8    102   CO2 24 20*   BUN 9 5*   CREATININE 0.9 0.6*   GLUCOSE 89 174*   CALCIUM 10.1 9.7       Recent Labs     04/28/24  1208 04/29/24  0740   WBC 8.6 8.3   RBC 5.41 5.18   HGB 17.0* 16.1   HCT 51.9 48.8   MCV 95.9 94.2   MCH 31.4 31.1   MCHC 32.8 33.0   RDW 14.1 13.7    209   MPV 10.0 10.1       Radiology:  CTA PULMONARY W CONTRAST   Final Result   1. There is no pulmonary embolus   2. Advanced centrilobular emphysematous changes   3. Left lower lobe pneumonia   4. Multiple areas of chronic pleuroparenchymal scarring seen within the right   and left lungs more prominent within the right lung base laterally.         XR CHEST PORTABLE   Final Result   1. Bibasilar airspace disease   2. Small right pleural effusion   3. Possible atelectasis within the right lung base   4. Emphysematous changes   5. CT of the chest is pending.              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 *      Plan:  COPDe on end-stage COPD- recurrent after completion of prolonged steroid taper as outpt. Cont IV steroids and transition PO when able, may need to be on long-term steroids given frequently recurrent COPDe. Cont azithromycin and doxycycline. Cont nebs. Pulm c/s, follows Dr. Little  Acute on Chronic Respiratory Failure with Hypoxia- 76% on 6L NC which is his baseline, now on 10L HFNC and wean as able. COVID/Flu negative. Treatment as above. Wean O2 as able  Pneumonia, LLL- CXR bibasilar airspace disease. CTPA no PE, shows LLL PNA. Notably patient with recent admission for LLL PNA, bronchoscopy at that time. Will cont doxy and rocephin for now and wean as able. 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 Cont bumex thrice weekly, does not appear to be acutely volume overloaded, does not appear to be in acute exac. Follows with Dr. 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 Cont azithromycin suppression, taking since 10/2021. Follows with ID as outpt, will c/s    Today's exam/findings/plan  -ID c/s given c/f recurrent infection and hx of SADE and Pseduomonas  -cont to wean O2 as able, cont steroids, cont nebs    NOTE: Portions of this report may have been transcribed using voice recognition software. Every effort was made to ensure accuracy; however, inadvertent computerized transcription errors may be present.  Electronically signed by Glenys Christine MD on 4/29/2024 at 4:48 PM

## 2024-04-29 NOTE — PLAN OF CARE
Problem: Discharge Planning  Goal: Discharge to home or other facility with appropriate resources  4/29/2024 1028 by Ariana Doran RN  Outcome: Progressing  4/28/2024 2129 by Talia Davis RN  Outcome: Progressing  Flowsheets (Taken 4/28/2024 1703 by Nannette Zavaleta, RN)  Discharge to home or other facility with appropriate resources: Identify barriers to discharge with patient and caregiver     Problem: Safety - Adult  Goal: Free from fall injury  4/29/2024 1028 by Ariana Doran RN  Outcome: Progressing  4/28/2024 2129 by Talia Davis RN  Outcome: Progressing  Flowsheets (Taken 4/28/2024 1704 by Nannette Zavaleta, RN)  Free From Fall Injury: Instruct family/caregiver on patient safety

## 2024-04-29 NOTE — ACP (ADVANCE CARE PLANNING)
Advance Care Planning   Healthcare Decision Maker:    Primary Decision Maker: Tara Cox CLARENCE - Spouse - [phone]    Click here to complete Healthcare Decision Makers including selection of the Healthcare Decision Maker Relationship (ie \"Primary\").  Today we documented Decision Maker(s) consistent with Legal Next of Kin hierarchy.

## 2024-04-29 NOTE — NOTE TO PATIENT VIA PORTAL
Readmission from home.Introduced my self and provided explanation of CM role to patient. Pt. Admitted for acute on chronic respiratory failure with hypoxia hx of COPD, emphysema, lung fibrosis, pulmonary hypertension. Pt. Currently on 10L HFNC. Pt. states at home he wears a baseline 5L O2 nc but had to increase it to 7-8L over the past few days. Home supplier is [employer]. He has home concentrator 10L flow and an [employer] concentrator. Has life vest wears BID. Pulmonology consult is pending. IV doxycycline, PO zithromax & nebs. He voices he resides in a one story home  with his spouse and completes his adl's with independence, w/o assistive devices. Patient is established with Dr. Loyd and uses [facility]'s pharmacy. Will continue to follow.

## 2024-04-29 NOTE — CONSULTS
Pulmonary Consultation    Admit Date: 4/28/2024  Requesting Physician: Juni Loyd MD    Reason for consultation:  Left lower lobe infiltrate  HPI:  The patient is a 73-year-old male with end-stage chronic obstructive pulmonary disease was recently admitted and discharged from the hospital.  At that time, he had presented with a left lower lobe infiltrate.  Bronchoscopy was unrevealing but MAC and Pseudomonas were both suspected as potential causative agents.  This just did not pan out on the cultures from the bronchoscopy.    The patient has been increasingly short of breath at home and presented to the hospital with that same complaint a CT scan of the chest evidenced involving infiltrate at the left lower lobe.  There appears to be some clearing of the earlier dense infiltrate however.    The patient denies any difficulty in swallowing or coughing with meals.    PMH:    Past Medical History:   Diagnosis Date    Acute and chronic respiratory failure with hypoxia ([facility]) 10/12/2017    chronic oxygen use    Acute exacerbation of chronic obstructive pulmonary disease (COPD) ([facility]) 8/7/2023    Acute heart failure with preserved ejection fraction ([facility]) 04/08/2021    follows with PCP    Acute respiratory disease due to severe acute respiratory syndrome coronavirus 2 (SARS-CoV-2) 01/01/2021    Acute respiratory failure with hypoxia ([facility]) 11/12/2018    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    Chronic back pain     Degeneration of umbar intervertebral disc    Colon cancer screening     COPD (chronic obstructive pulmonary disease) ([facility])     Coronavirus infection 02/02/2022    Coronavirus 229E    Cough with hemoptysis 04/23/2019    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

## 2024-04-30 LAB
ANION GAP SERPL CALCULATED.3IONS-SCNC: 12 MMOL/L (ref 7–16)
BASOPHILS # BLD: 0 K/UL (ref 0–0.2)
BASOPHILS NFR BLD: 0 % (ref 0–2)
BUN SERPL-MCNC: 11 MG/DL (ref 6–23)
CALCIUM SERPL-MCNC: 9.6 MG/DL (ref 8.6–10.2)
CHLORIDE SERPL-SCNC: 102 MMOL/L (ref 98–107)
CO2 SERPL-SCNC: 24 MMOL/L (ref 22–29)
CREAT SERPL-MCNC: 0.8 MG/DL (ref 0.7–1.2)
EOSINOPHIL # BLD: 0 K/UL (ref 0.05–0.5)
EOSINOPHILS RELATIVE PERCENT: 0 % (ref 0–6)
ERYTHROCYTE [DISTWIDTH] IN BLOOD BY AUTOMATED COUNT: 14 % (ref 11.5–15)
GFR SERPL CREATININE-BSD FRML MDRD: >90 ML/MIN/1.73M2
GLUCOSE SERPL-MCNC: 129 MG/DL (ref 74–99)
HCT VFR BLD AUTO: 45.4 % (ref 37–54)
HGB BLD-MCNC: 15 G/DL (ref 12.5–16.5)
IMM GRANULOCYTES # BLD AUTO: 0.05 K/UL (ref 0–0.58)
IMM GRANULOCYTES NFR BLD: 0 % (ref 0–5)
L PNEUMO1 AG UR QL IA.RAPID: NEGATIVE
LYMPHOCYTES NFR BLD: 0.44 K/UL (ref 1.5–4)
LYMPHOCYTES RELATIVE PERCENT: 4 % (ref 20–42)
MCH RBC QN AUTO: 30.7 PG (ref 26–35)
MCHC RBC AUTO-ENTMCNC: 33 G/DL (ref 32–34.5)
MCV RBC AUTO: 92.8 FL (ref 80–99.9)
MONOCYTES NFR BLD: 0.4 K/UL (ref 0.1–0.95)
MONOCYTES NFR BLD: 3 % (ref 2–12)
NEUTROPHILS NFR BLD: 93 % (ref 43–80)
NEUTS SEG NFR BLD: 10.99 K/UL (ref 1.8–7.3)
PLATELET # BLD AUTO: 225 K/UL (ref 130–450)
PMV BLD AUTO: 10.1 FL (ref 7–12)
POTASSIUM SERPL-SCNC: 3.9 MMOL/L (ref 3.5–5)
RBC # BLD AUTO: 4.89 M/UL (ref 3.8–5.8)
RBC # BLD: NORMAL 10*6/UL
S PNEUM AG SPEC QL: NEGATIVE
SODIUM SERPL-SCNC: 138 MMOL/L (ref 132–146)
SPECIMEN SOURCE: NORMAL
WBC OTHER # BLD: 11.9 K/UL (ref 4.5–11.5)

## 2024-04-30 PROCEDURE — 2580000003 HC RX 258: Performed by: STUDENT IN AN ORGANIZED HEALTH CARE EDUCATION/TRAINING PROGRAM

## 2024-04-30 PROCEDURE — 2700000000 HC OXYGEN THERAPY PER DAY

## 2024-04-30 PROCEDURE — 6370000000 HC RX 637 (ALT 250 FOR IP): Performed by: STUDENT IN AN ORGANIZED HEALTH CARE EDUCATION/TRAINING PROGRAM

## 2024-04-30 PROCEDURE — 99232 SBSQ HOSP IP/OBS MODERATE 35: CPT | Performed by: STUDENT IN AN ORGANIZED HEALTH CARE EDUCATION/TRAINING PROGRAM

## 2024-04-30 PROCEDURE — 80048 BASIC METABOLIC PNL TOTAL CA: CPT

## 2024-04-30 PROCEDURE — 2580000003 HC RX 258: Performed by: SPECIALIST

## 2024-04-30 PROCEDURE — 36415 COLL VENOUS BLD VENIPUNCTURE: CPT

## 2024-04-30 PROCEDURE — 85025 COMPLETE CBC W/AUTO DIFF WBC: CPT

## 2024-04-30 PROCEDURE — 6370000000 HC RX 637 (ALT 250 FOR IP): Performed by: INTERNAL MEDICINE

## 2024-04-30 PROCEDURE — 6360000002 HC RX W HCPCS: Performed by: STUDENT IN AN ORGANIZED HEALTH CARE EDUCATION/TRAINING PROGRAM

## 2024-04-30 PROCEDURE — 2060000000 HC ICU INTERMEDIATE R&B

## 2024-04-30 PROCEDURE — 94640 AIRWAY INHALATION TREATMENT: CPT

## 2024-04-30 PROCEDURE — 6360000002 HC RX W HCPCS: Performed by: SPECIALIST

## 2024-04-30 RX ADMIN — MIDODRINE HYDROCHLORIDE 2.5 MG: 2.5 TABLET ORAL at 14:21

## 2024-04-30 RX ADMIN — IPRATROPIUM BROMIDE AND ALBUTEROL SULFATE 1 DOSE: 2.5; .5 SOLUTION RESPIRATORY (INHALATION) at 15:41

## 2024-04-30 RX ADMIN — ARFORMOTEROL TARTRATE 15 MCG: 15 SOLUTION RESPIRATORY (INHALATION) at 18:48

## 2024-04-30 RX ADMIN — MIDODRINE HYDROCHLORIDE 2.5 MG: 2.5 TABLET ORAL at 09:03

## 2024-04-30 RX ADMIN — BUDESONIDE INHALATION 500 MCG: 0.5 SUSPENSION RESPIRATORY (INHALATION) at 18:48

## 2024-04-30 RX ADMIN — IPRATROPIUM BROMIDE AND ALBUTEROL SULFATE 1 DOSE: 2.5; .5 SOLUTION RESPIRATORY (INHALATION) at 12:12

## 2024-04-30 RX ADMIN — CEFEPIME 2000 MG: 2 INJECTION, POWDER, FOR SOLUTION INTRAVENOUS at 17:37

## 2024-04-30 RX ADMIN — IPRATROPIUM BROMIDE AND ALBUTEROL SULFATE 1 DOSE: 2.5; .5 SOLUTION RESPIRATORY (INHALATION) at 18:48

## 2024-04-30 RX ADMIN — ARFORMOTEROL TARTRATE 15 MCG: 15 SOLUTION RESPIRATORY (INHALATION) at 07:29

## 2024-04-30 RX ADMIN — METHYLPREDNISOLONE SODIUM SUCCINATE 40 MG: 40 INJECTION INTRAMUSCULAR; INTRAVENOUS at 06:22

## 2024-04-30 RX ADMIN — SODIUM CHLORIDE, PRESERVATIVE FREE 10 ML: 5 INJECTION INTRAVENOUS at 21:50

## 2024-04-30 RX ADMIN — BUDESONIDE INHALATION 500 MCG: 0.5 SUSPENSION RESPIRATORY (INHALATION) at 07:30

## 2024-04-30 RX ADMIN — MIDODRINE HYDROCHLORIDE 2.5 MG: 2.5 TABLET ORAL at 21:42

## 2024-04-30 RX ADMIN — IPRATROPIUM BROMIDE AND ALBUTEROL SULFATE 1 DOSE: 2.5; .5 SOLUTION RESPIRATORY (INHALATION) at 07:30

## 2024-04-30 RX ADMIN — CEFEPIME 2000 MG: 2 INJECTION, POWDER, FOR SOLUTION INTRAVENOUS at 06:21

## 2024-04-30 RX ADMIN — SODIUM CHLORIDE, PRESERVATIVE FREE 10 ML: 5 INJECTION INTRAVENOUS at 09:04

## 2024-04-30 RX ADMIN — ENOXAPARIN SODIUM 40 MG: 100 INJECTION SUBCUTANEOUS at 09:03

## 2024-04-30 ASSESSMENT — PAIN SCALES - GENERAL: PAINLEVEL_OUTOF10: 0

## 2024-04-30 NOTE — PROGRESS NOTES
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 He admits he is tolerating activity with less dyspnea. His secretions are light yellow in color.       Vitals:  VITALS:  /65   Pulse 90   Temp 98.1 °F (36.7 °C) (Oral)   Resp 20   Ht 1.829 m (6')   Wt 82.6 kg (182 lb)   SpO2 (!) 89%   BMI 24.68 kg/m²     24HR INTAKE/OUTPUT:      Intake/Output Summary (Last 24 hours) at 2024 1102  Last data filed at 2024 1055  Gross per 24 hour   Intake 707.21 ml   Output 2050 ml   Net -1342.79 ml       24HR PULSE OXIMETRY RANGE:    SpO2  Av.1 %  Min: 88 %  Max: 95 %      Medications:  IV:   sodium chloride         Scheduled Meds:   ipratropium 0.5 mg-albuterol 2.5 mg  1 Dose Inhalation Q4H RT    cefepime  2,000 mg IntraVENous Q12H    sodium chloride flush  5-40 mL IntraVENous 2 times per day    enoxaparin  40 mg SubCUTAneous Daily    azithromycin  500 mg Oral Once per day on     budesonide  500 mcg Nebulization BID RT    bumetanide  1 mg Oral Once per day on     arformoterol tartrate  15 mcg Nebulization BID RT

## 2024-04-30 NOTE — PROGRESS NOTES
[facility] Hospitalist Progress Note    Admitting Date and Time: 4/28/2024 11:50 AM  Admit Dx: COPD exacerbation (HCC) [J44.1]  Acute respiratory failure with hypoxia (HCC) [J96.01]  Pneumonia due to infectious organism, unspecified laterality, unspecified part of lung [J18.9]    Subjective:  Patient is being followed for COPD exacerbation (HCC) [J44.1]  Acute respiratory failure with hypoxia (HCC) [J96.01]  Pneumonia due to infectious organism, unspecified laterality, unspecified part of lung [J18.9]   Pt feels okay  Per RN: no additional concerns    ROS: denies fever, chills, cp, sob, n/v, HA unless stated above.      ipratropium 0.5 mg-albuterol 2.5 mg  1 Dose Inhalation Q4H RT    cefepime  2,000 mg IntraVENous Q12H    sodium chloride flush  5-40 mL IntraVENous 2 times per day    enoxaparin  40 mg SubCUTAneous Daily    azithromycin  500 mg Oral Once per day on Mon Wed Fri    budesonide  500 mcg Nebulization BID RT    bumetanide  1 mg Oral Once per day on Mon Wed Fri    arformoterol tartrate  15 mcg Nebulization BID RT    midodrine  2.5 mg Oral TID     sodium chloride flush, 5-40 mL, PRN  sodium chloride, , PRN  potassium chloride, 40 mEq, PRN   Or  potassium alternative oral replacement, 40 mEq, PRN   Or  potassium chloride, 10 mEq, PRN  magnesium sulfate, 2,000 mg, PRN  ondansetron, 4 mg, Q8H PRN   Or  ondansetron, 4 mg, Q6H PRN  polyethylene glycol, 17 g, Daily PRN  acetaminophen, 650 mg, Q6H PRN   Or  acetaminophen, 650 mg, Q6H PRN  melatonin, 3 mg, Nightly PRN  ipratropium 0.5 mg-albuterol 2.5 mg, 1 Dose, Q4H PRN  oxyCODONE-acetaminophen, 1 tablet, BID PRN   And  oxyCODONE, 2.5 mg, BID PRN         Objective:  /72   Pulse (!) 105   Temp 98.2 °F (36.8 °C) (Axillary)   Resp 19   Ht 1.829 m (6')   Wt 82.6 kg (182 lb)   SpO2 90%   BMI 24.68 kg/m²     General Appearance: alert and oriented to person, place and time and in NAD, sitting in bed comfortably eating  Skin: warm and dry  Head:

## 2024-04-30 NOTE — CONSULTS
[facility]  [facility]  Consultation Note     Admit Date: 4/28/2024 11:50 AM    Reason for Consult:   History of SADE and Pseudomonas of lung.  Suspected left lower lobe pneumonia    Attending Physician:  Glenys Christine MD    HISTORY OF PRESENT ILLNESS:             The history is obtained from extensive review of available past medical records. The patient is a 73 y.o. male who is previously known to the ID service.    The patient has a history of terminal COPD.  He also has a history of MAC that was treated with Antifungal, Rifampin and Clarithromycin back in 2019.  This was followed by Azithromycin.  At 1 point he was offered  inhaled Amikacin (Arikayce) but it was too expensive for him.    He was seen in January for possible Pseudomonas HCAP and was treated with inhaled Tobramycin together with oral Levofloxacin.  Seen in the office in follow-up in February.    The patient was recently admitted to the hospital with an exacerbation of COPD in late March 2024.  He had a bronchoscopy on 4/4/2024 that grew rare Mariah dubliniensis and Penicillium species.  He was treated with Cefepime.  ID was not asked to see him.    He now comes back to the hospital from 4/28/2024 with shortness of breath.  He did have an episode or 2 of a productive brownish sputum.  No systemic signs of infection such as fevers, chills or diaphoresis.  Less than he was discharged home he was on 7 L of oxygen.  He is down to 6.  He cannot produce much sputum.  A CT of the chest showed a left lower lobe infiltrate.  Respiratory panel has tested positive for coronavirus NL 63.  He was treated with Cefepime once again.  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    Past Medical History:        Diagnosis Date    Acute and chronic respiratory failure with hypoxia (HCC) 10/12/2017    chronic oxygen use    Acute exacerbation of chronic obstructive  TID     Continuous Infusions:   sodium chloride       PRN Meds:sodium chloride flush, sodium chloride, potassium chloride **OR** potassium alternative oral replacement **OR** potassium chloride, magnesium sulfate, ondansetron **OR** ondansetron, polyethylene glycol, acetaminophen **OR** acetaminophen, melatonin, ipratropium 0.5 mg-albuterol 2.5 mg, oxyCODONE-acetaminophen **AND** oxyCODONE    Allergies:  Patient has no known allergies.    Social History:   Social History     Socioeconomic History    Marital status:      Spouse name: None    Number of children: 2    Years of education: None    Highest education level: None   Tobacco Use    Smoking status: Former     Current packs/day: 0.00     Average packs/day: 2.0 packs/day for 48.0 years (96.0 ttl pk-yrs)     Types: Cigarettes     Start date: 10/12/1968     Quit date: 10/12/2016     Years since quittin.5    Smokeless tobacco: Never   Vaping Use    Vaping Use: Never used   Substance and Sexual Activity    Alcohol use: No    Drug use: No    Sexual activity: Defer     Social Determinants of Health     Financial Resource Strain: Low Risk  (2023)    Overall Financial Resource Strain (CARDIA)     Difficulty of Paying Living Expenses: Not hard at all   Food Insecurity: No Food Insecurity (2024)    Hunger Vital Sign     Worried About Running Out of Food in the Last Year: Never true     Ran Out of Food in the Last Year: Never true   Transportation Needs: No Transportation Needs (2024)    PRAPARE - Transportation     Lack of Transportation (Medical): No     Lack of Transportation (Non-Medical): No   Physical Activity: Inactive (2023)    Exercise Vital Sign     Days of Exercise per Week: 0 days     Minutes of Exercise per Session: 0 min   Housing Stability: Low Risk  (2024)    Housing Stability Vital Sign     Unable to Pay for Housing in the Last Year: No     Number of Places Lived in the Last Year: 1     Unstable Housing in the Last

## 2024-04-30 NOTE — CARE COORDINATION
Chart review - Currently on 6L O2 nc Pulmonology is following - resp cx's pending poss bronch. ID consult pending. IV cefepime, IV solumedrol, nebs. Home oxygen supplier is [employer], has life vest. Will continue to follow.  Charito SUAREZN, RN  Case Management

## 2024-04-30 NOTE — PLAN OF CARE
Problem: Discharge Planning  Goal: Discharge to home or other facility with appropriate resources  4/29/2024 2216 by Sandy Em RN  Outcome: Progressing  Flowsheets (Taken 4/29/2024 2045)  Discharge to home or other facility with appropriate resources:   Identify barriers to discharge with patient and caregiver   Arrange for needed discharge resources and transportation as appropriate  4/29/2024 1028 by Ariana Doran RN  Outcome: Progressing     Problem: Safety - Adult  Goal: Free from fall injury  4/29/2024 2216 by Sandy Em RN  Outcome: Progressing  4/29/2024 1028 by Ariana Doran RN  Outcome: Progressing     Problem: Chronic Conditions and Co-morbidities  Goal: Patient's chronic conditions and co-morbidity symptoms are monitored and maintained or improved  Outcome: Progressing     Problem: Discharge Planning  Goal: Discharge to home or other facility with appropriate resources  4/29/2024 2216 by Sandy Em RN  Outcome: Progressing  Flowsheets (Taken 4/29/2024 2045)  Discharge to home or other facility with appropriate resources:   Identify barriers to discharge with patient and caregiver   Arrange for needed discharge resources and transportation as appropriate  4/29/2024 1028 by Ariana Doran RN  Outcome: Progressing     Problem: Safety - Adult  Goal: Free from fall injury  4/29/2024 2216 by Sandy Em RN  Outcome: Progressing  4/29/2024 1028 by Ariana Doran RN  Outcome: Progressing     Problem: Chronic Conditions and Co-morbidities  Goal: Patient's chronic conditions and co-morbidity symptoms are monitored and maintained or improved  Outcome: Progressing

## 2024-04-30 NOTE — PLAN OF CARE
Problem: Discharge Planning  Goal: Discharge to home or other facility with appropriate resources  4/30/2024 0925 by Ariana Doran RN  Outcome: Progressing  4/29/2024 2216 by Sandy Em RN  Outcome: Progressing  Flowsheets (Taken 4/29/2024 2045)  Discharge to home or other facility with appropriate resources:   Identify barriers to discharge with patient and caregiver   Arrange for needed discharge resources and transportation as appropriate     Problem: Safety - Adult  Goal: Free from fall injury  4/30/2024 0925 by Ariana Doran RN  Outcome: Progressing  4/29/2024 2216 by Sandy Em RN  Outcome: Progressing     Problem: Chronic Conditions and Co-morbidities  Goal: Patient's chronic conditions and co-morbidity symptoms are monitored and maintained or improved  4/30/2024 0925 by Ariana Dorna RN  Outcome: Progressing  4/29/2024 2216 by Sandy Em, RN  Outcome: Progressing     Problem: Pain  Goal: Verbalizes/displays adequate comfort level or baseline comfort level  Outcome: Progressing

## 2024-05-01 ENCOUNTER — ANESTHESIA EVENT (OUTPATIENT)
Dept: ENDOSCOPY | Age: 74
End: 2024-05-01
Payer: MEDICARE

## 2024-05-01 ENCOUNTER — ANESTHESIA (OUTPATIENT)
Dept: ENDOSCOPY | Age: 74
End: 2024-05-01
Payer: MEDICARE

## 2024-05-01 LAB
ANION GAP SERPL CALCULATED.3IONS-SCNC: 11 MMOL/L (ref 7–16)
BASOPHILS # BLD: 0.01 K/UL (ref 0–0.2)
BASOPHILS NFR BLD: 0 % (ref 0–2)
BUN SERPL-MCNC: 11 MG/DL (ref 6–23)
CALCIUM SERPL-MCNC: 9.5 MG/DL (ref 8.6–10.2)
CHLORIDE SERPL-SCNC: 103 MMOL/L (ref 98–107)
CO2 SERPL-SCNC: 25 MMOL/L (ref 22–29)
CREAT SERPL-MCNC: 0.7 MG/DL (ref 0.7–1.2)
EOSINOPHIL # BLD: 0.05 K/UL (ref 0.05–0.5)
EOSINOPHILS RELATIVE PERCENT: 1 % (ref 0–6)
ERYTHROCYTE [DISTWIDTH] IN BLOOD BY AUTOMATED COUNT: 14 % (ref 11.5–15)
GFR, ESTIMATED: >90 ML/MIN/1.73M2
GLUCOSE SERPL-MCNC: 75 MG/DL (ref 74–99)
HCT VFR BLD AUTO: 47.6 % (ref 37–54)
HGB BLD-MCNC: 15.4 G/DL (ref 12.5–16.5)
IMM GRANULOCYTES # BLD AUTO: 0.03 K/UL (ref 0–0.58)
IMM GRANULOCYTES NFR BLD: 0 % (ref 0–5)
LYMPHOCYTES NFR BLD: 1.24 K/UL (ref 1.5–4)
LYMPHOCYTES RELATIVE PERCENT: 15 % (ref 20–42)
MCH RBC QN AUTO: 31.4 PG (ref 26–35)
MCHC RBC AUTO-ENTMCNC: 32.4 G/DL (ref 32–34.5)
MCV RBC AUTO: 96.9 FL (ref 80–99.9)
MONOCYTES NFR BLD: 0.52 K/UL (ref 0.1–0.95)
MONOCYTES NFR BLD: 6 % (ref 2–12)
NEUTROPHILS NFR BLD: 78 % (ref 43–80)
NEUTS SEG NFR BLD: 6.69 K/UL (ref 1.8–7.3)
PLATELET # BLD AUTO: 201 K/UL (ref 130–450)
PMV BLD AUTO: 10 FL (ref 7–12)
POTASSIUM SERPL-SCNC: 4 MMOL/L (ref 3.5–5)
RBC # BLD AUTO: 4.91 M/UL (ref 3.8–5.8)
SODIUM SERPL-SCNC: 139 MMOL/L (ref 132–146)
WBC OTHER # BLD: 8.5 K/UL (ref 4.5–11.5)

## 2024-05-01 PROCEDURE — 0BC18ZZ EXTIRPATION OF MATTER FROM TRACHEA, VIA NATURAL OR ARTIFICIAL OPENING ENDOSCOPIC: ICD-10-PCS | Performed by: INTERNAL MEDICINE

## 2024-05-01 PROCEDURE — 3609010800 HC BRONCHOSCOPY ALVEOLAR LAVAGE: Performed by: INTERNAL MEDICINE

## 2024-05-01 PROCEDURE — 2580000003 HC RX 258: Performed by: SPECIALIST

## 2024-05-01 PROCEDURE — 87305 ASPERGILLUS AG IA: CPT

## 2024-05-01 PROCEDURE — 7100000011 HC PHASE II RECOVERY - ADDTL 15 MIN: Performed by: INTERNAL MEDICINE

## 2024-05-01 PROCEDURE — 3700000000 HC ANESTHESIA ATTENDED CARE: Performed by: INTERNAL MEDICINE

## 2024-05-01 PROCEDURE — 87070 CULTURE OTHR SPECIMN AEROBIC: CPT

## 2024-05-01 PROCEDURE — 87206 SMEAR FLUORESCENT/ACID STAI: CPT

## 2024-05-01 PROCEDURE — 6370000000 HC RX 637 (ALT 250 FOR IP): Performed by: INTERNAL MEDICINE

## 2024-05-01 PROCEDURE — 2700000000 HC OXYGEN THERAPY PER DAY

## 2024-05-01 PROCEDURE — 89051 BODY FLUID CELL COUNT: CPT

## 2024-05-01 PROCEDURE — 6360000002 HC RX W HCPCS: Performed by: SPECIALIST

## 2024-05-01 PROCEDURE — 6360000002 HC RX W HCPCS: Performed by: STUDENT IN AN ORGANIZED HEALTH CARE EDUCATION/TRAINING PROGRAM

## 2024-05-01 PROCEDURE — 2500000003 HC RX 250 WO HCPCS: Performed by: INTERNAL MEDICINE

## 2024-05-01 PROCEDURE — 2709999900 HC NON-CHARGEABLE SUPPLY: Performed by: INTERNAL MEDICINE

## 2024-05-01 PROCEDURE — 0B9J8ZZ DRAINAGE OF LEFT LOWER LUNG LOBE, VIA NATURAL OR ARTIFICIAL OPENING ENDOSCOPIC: ICD-10-PCS | Performed by: INTERNAL MEDICINE

## 2024-05-01 PROCEDURE — 0BCB8ZZ EXTIRPATION OF MATTER FROM LEFT LOWER LOBE BRONCHUS, VIA NATURAL OR ARTIFICIAL OPENING ENDOSCOPIC: ICD-10-PCS | Performed by: INTERNAL MEDICINE

## 2024-05-01 PROCEDURE — 3700000001 HC ADD 15 MINUTES (ANESTHESIA): Performed by: INTERNAL MEDICINE

## 2024-05-01 PROCEDURE — 87116 MYCOBACTERIA CULTURE: CPT

## 2024-05-01 PROCEDURE — 87205 SMEAR GRAM STAIN: CPT

## 2024-05-01 PROCEDURE — 80048 BASIC METABOLIC PNL TOTAL CA: CPT

## 2024-05-01 PROCEDURE — 2580000003 HC RX 258: Performed by: NURSE ANESTHETIST, CERTIFIED REGISTERED

## 2024-05-01 PROCEDURE — 99232 SBSQ HOSP IP/OBS MODERATE 35: CPT | Performed by: STUDENT IN AN ORGANIZED HEALTH CARE EDUCATION/TRAINING PROGRAM

## 2024-05-01 PROCEDURE — 94640 AIRWAY INHALATION TREATMENT: CPT

## 2024-05-01 PROCEDURE — 6360000002 HC RX W HCPCS: Performed by: NURSE ANESTHETIST, CERTIFIED REGISTERED

## 2024-05-01 PROCEDURE — 87102 FUNGUS ISOLATION CULTURE: CPT

## 2024-05-01 PROCEDURE — 6370000000 HC RX 637 (ALT 250 FOR IP): Performed by: STUDENT IN AN ORGANIZED HEALTH CARE EDUCATION/TRAINING PROGRAM

## 2024-05-01 PROCEDURE — 7100000010 HC PHASE II RECOVERY - FIRST 15 MIN: Performed by: INTERNAL MEDICINE

## 2024-05-01 PROCEDURE — 87015 SPECIMEN INFECT AGNT CONCNTJ: CPT

## 2024-05-01 PROCEDURE — 0WCQ8ZZ EXTIRPATION OF MATTER FROM RESPIRATORY TRACT, VIA NATURAL OR ARTIFICIAL OPENING ENDOSCOPIC: ICD-10-PCS | Performed by: INTERNAL MEDICINE

## 2024-05-01 PROCEDURE — 85025 COMPLETE CBC W/AUTO DIFF WBC: CPT

## 2024-05-01 PROCEDURE — 36415 COLL VENOUS BLD VENIPUNCTURE: CPT

## 2024-05-01 PROCEDURE — 88112 CYTOPATH CELL ENHANCE TECH: CPT

## 2024-05-01 PROCEDURE — 2060000000 HC ICU INTERMEDIATE R&B

## 2024-05-01 PROCEDURE — 2580000003 HC RX 258: Performed by: STUDENT IN AN ORGANIZED HEALTH CARE EDUCATION/TRAINING PROGRAM

## 2024-05-01 RX ORDER — LIDOCAINE HYDROCHLORIDE 20 MG/ML
INJECTION, SOLUTION EPIDURAL; INFILTRATION; INTRACAUDAL; PERINEURAL PRN
Status: DISCONTINUED | OUTPATIENT
Start: 2024-05-01 | End: 2024-05-01 | Stop reason: ALTCHOICE

## 2024-05-01 RX ORDER — PROPOFOL 10 MG/ML
INJECTION, EMULSION INTRAVENOUS PRN
Status: DISCONTINUED | OUTPATIENT
Start: 2024-05-01 | End: 2024-05-01 | Stop reason: SDUPTHER

## 2024-05-01 RX ORDER — SODIUM CHLORIDE 9 MG/ML
INJECTION, SOLUTION INTRAVENOUS CONTINUOUS PRN
Status: DISCONTINUED | OUTPATIENT
Start: 2024-05-01 | End: 2024-05-01 | Stop reason: SDUPTHER

## 2024-05-01 RX ORDER — LIDOCAINE HYDROCHLORIDE 20 MG/ML
SOLUTION OROPHARYNGEAL PRN
Status: DISCONTINUED | OUTPATIENT
Start: 2024-05-01 | End: 2024-05-01 | Stop reason: ALTCHOICE

## 2024-05-01 RX ADMIN — CEFEPIME 2000 MG: 2 INJECTION, POWDER, FOR SOLUTION INTRAVENOUS at 06:20

## 2024-05-01 RX ADMIN — MIDODRINE HYDROCHLORIDE 2.5 MG: 2.5 TABLET ORAL at 10:19

## 2024-05-01 RX ADMIN — ARFORMOTEROL TARTRATE 15 MCG: 15 SOLUTION RESPIRATORY (INHALATION) at 20:14

## 2024-05-01 RX ADMIN — ACETAMINOPHEN 650 MG: 325 TABLET ORAL at 20:25

## 2024-05-01 RX ADMIN — BUDESONIDE INHALATION 500 MCG: 0.5 SUSPENSION RESPIRATORY (INHALATION) at 07:39

## 2024-05-01 RX ADMIN — IPRATROPIUM BROMIDE AND ALBUTEROL SULFATE 1 DOSE: 2.5; .5 SOLUTION RESPIRATORY (INHALATION) at 20:14

## 2024-05-01 RX ADMIN — ARFORMOTEROL TARTRATE 15 MCG: 15 SOLUTION RESPIRATORY (INHALATION) at 07:39

## 2024-05-01 RX ADMIN — ENOXAPARIN SODIUM 40 MG: 100 INJECTION SUBCUTANEOUS at 17:37

## 2024-05-01 RX ADMIN — SODIUM CHLORIDE: 9 INJECTION, SOLUTION INTRAVENOUS at 13:20

## 2024-05-01 RX ADMIN — IPRATROPIUM BROMIDE AND ALBUTEROL SULFATE 1 DOSE: 2.5; .5 SOLUTION RESPIRATORY (INHALATION) at 16:21

## 2024-05-01 RX ADMIN — AZITHROMYCIN 500 MG: 250 TABLET, FILM COATED ORAL at 17:37

## 2024-05-01 RX ADMIN — CEFEPIME 2000 MG: 2 INJECTION, POWDER, FOR SOLUTION INTRAVENOUS at 17:38

## 2024-05-01 RX ADMIN — BUDESONIDE INHALATION 500 MCG: 0.5 SUSPENSION RESPIRATORY (INHALATION) at 20:14

## 2024-05-01 RX ADMIN — SODIUM CHLORIDE, PRESERVATIVE FREE 10 ML: 5 INJECTION INTRAVENOUS at 11:36

## 2024-05-01 RX ADMIN — IPRATROPIUM BROMIDE AND ALBUTEROL SULFATE 1 DOSE: 2.5; .5 SOLUTION RESPIRATORY (INHALATION) at 11:56

## 2024-05-01 RX ADMIN — BUMETANIDE 1 MG: 1 TABLET ORAL at 20:25

## 2024-05-01 RX ADMIN — PROPOFOL 100 MG: 10 INJECTION, EMULSION INTRAVENOUS at 13:22

## 2024-05-01 RX ADMIN — MIDODRINE HYDROCHLORIDE 2.5 MG: 2.5 TABLET ORAL at 20:25

## 2024-05-01 RX ADMIN — IPRATROPIUM BROMIDE AND ALBUTEROL SULFATE 1 DOSE: 2.5; .5 SOLUTION RESPIRATORY (INHALATION) at 00:19

## 2024-05-01 RX ADMIN — PROPOFOL 50 MG: 10 INJECTION, EMULSION INTRAVENOUS at 13:28

## 2024-05-01 RX ADMIN — IPRATROPIUM BROMIDE AND ALBUTEROL SULFATE 1 DOSE: 2.5; .5 SOLUTION RESPIRATORY (INHALATION) at 07:39

## 2024-05-01 ASSESSMENT — PAIN - FUNCTIONAL ASSESSMENT
PAIN_FUNCTIONAL_ASSESSMENT: 0-10
PAIN_FUNCTIONAL_ASSESSMENT: PREVENTS OR INTERFERES SOME ACTIVE ACTIVITIES AND ADLS
PAIN_FUNCTIONAL_ASSESSMENT: NONE - DENIES PAIN
PAIN_FUNCTIONAL_ASSESSMENT: 0-10
PAIN_FUNCTIONAL_ASSESSMENT: 0-10

## 2024-05-01 ASSESSMENT — PAIN DESCRIPTION - LOCATION: LOCATION: BACK

## 2024-05-01 ASSESSMENT — PAIN DESCRIPTION - PAIN TYPE: TYPE: CHRONIC PAIN

## 2024-05-01 ASSESSMENT — PAIN SCALES - GENERAL
PAINLEVEL_OUTOF10: 0
PAINLEVEL_OUTOF10: 6
PAINLEVEL_OUTOF10: 0

## 2024-05-01 ASSESSMENT — LIFESTYLE VARIABLES: SMOKING_STATUS: 0

## 2024-05-01 ASSESSMENT — PAIN DESCRIPTION - ORIENTATION: ORIENTATION: LOWER

## 2024-05-01 ASSESSMENT — ENCOUNTER SYMPTOMS
SHORTNESS OF BREATH: 1
DYSPNEA ACTIVITY LEVEL: NO INTERVAL CHANGE

## 2024-05-01 ASSESSMENT — PAIN DESCRIPTION - DESCRIPTORS: DESCRIPTORS: ACHING;DISCOMFORT;SHARP

## 2024-05-01 ASSESSMENT — COPD QUESTIONNAIRES: CAT_SEVERITY: NO INTERVAL CHANGE

## 2024-05-01 NOTE — PROGRESS NOTES
Jennifer fournier unable to visit due to patient being off unit for a procedure.  remains available for support.

## 2024-05-01 NOTE — ANESTHESIA POSTPROCEDURE EVALUATION
Department of Anesthesiology  Postprocedure Note    Patient: Jason Cox  MRN: [id number]  YOB: 1950  Date of evaluation: 5/1/2024    Procedure Summary       Date: 05/01/24 Room / Location: [place] [id number] / [facility]    Anesthesia Start: 1320 Anesthesia Stop: 1338    Procedure: BRONCHOSCOPY ALVEOLAR LAVAGE Diagnosis:       Left lower lobe pulmonary infiltrate      (Left lower lobe pulmonary infiltrate [R91.8])    Surgeons: Rolf Little MD Responsible Provider: Jaclyn Zamorano DO    Anesthesia Type: MAC ASA Status: 4            Anesthesia Type: No value filed.    Moon Phase I: Moon Score: 9    Moon Phase II:      Anesthesia Post Evaluation    Patient location during evaluation: PACU  Patient participation: complete - patient participated  Level of consciousness: awake and alert  Nausea & Vomiting: no vomiting and no nausea  Cardiovascular status: hemodynamically stable  Respiratory status: acceptable and spontaneous ventilation  Hydration status: stable  Pain management: adequate    No notable events documented.

## 2024-05-01 NOTE — PROGRESS NOTES
0221 pt is on the cmr monitor and is in their view  and also called out to waiting room to have any family members return to room 600

## 2024-05-01 NOTE — PROGRESS NOTES
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 He admits his cough has decreased still present and minimally productive.       Vitals:  VITALS:  /76   Pulse 71   Temp 99 °F (37.2 °C) (Oral)   Resp 16   Ht 1.829 m (6')   Wt 82.6 kg (182 lb 1.6 oz)   SpO2 91%   BMI 24.70 kg/m²     24HR INTAKE/OUTPUT:      Intake/Output Summary (Last 24 hours) at 2024 1038  Last data filed at 2024 0120  Gross per 24 hour   Intake 553.86 ml   Output 900 ml   Net -346.14 ml         24HR PULSE OXIMETRY RANGE:    SpO2  Av.5 %  Min: 89 %  Max: 95 %      Medications:  IV:   sodium chloride         Scheduled Meds:   ipratropium 0.5 mg-albuterol 2.5 mg  1 Dose Inhalation Q4H RT    cefepime  2,000 mg IntraVENous Q12H    sodium chloride flush  5-40 mL IntraVENous 2 times per day    enoxaparin  40 mg SubCUTAneous Daily    azithromycin  500 mg Oral Once per day on     budesonide  500 mcg Nebulization BID RT    bumetanide  1 mg Oral Once per day on     arformoterol tartrate  15 mcg Nebulization BID RT    midodrine  2.5 mg Oral TID        Diet:   Diet NPO Exceptions are: Sips of Water with Meds     EXAM:  General: No distress. Alert.  Eyes: PERRL. No sclera icterus. No conjunctival injection.  ENT: No discharge. Pharynx clear.  Neck: Trachea midline. Normal thyroid.  Resp: No accessory muscle use. no rales. no wheezing. no rhonchi.   CV: Regular rate. Regular rhythm. No murmur or rub.   Abd: Non-tender. Non-distended. No masses. No organomegaly. Normal bowel sounds.   Skin: Warm and dry. No nodule on exposed extremities. No rash on exposed extremities.  Ext: No cyanosis, clubbing, edema  Lymph: No cervical LAD. No supraclavicular LAD.   M/S: No cyanosis. No joint deformity. No clubbing.   Neuro: Awake. Follows commands. Positive pupils/gag/corneals. Normal pain response.       Results:  CBC:   Recent Labs     04/29/24  0740 04/30/24  0343 05/01/24  0539   WBC 8.3 11.9* 8.5   HGB 16.1 15.0 15.4   HCT 48.8 45.4 47.6   MCV 94.2 92.8 96.9    225 201       BMP:   Recent Labs     04/29/24  0740 04/30/24  0343 05/01/24  0539    138 139   K 3.8 3.9 4.0    102 103   CO2 20* 24 25   BUN 5* 11 11   CREATININE 0.6* 0.8 0.7       LIVER PROFILE:   Recent Labs     04/28/24  1208   AST 20   ALT 12   BILITOT 0.7   ALKPHOS 71       PT/INR: No results for input(s): \"PROTIME\", \"INR\" in the last 72 hours.  APTT: No results for input(s): \"APTT\" in the last 72 hours.      Pathology:  N/A      Microbiology:  None    Recent ABG:   Recent Labs     04/28/24  1217   PH 7.436   PO2 55.0*   PCO2 31.5*   HCO3 20.7*   BE -2.3   O2SAT 89.2*   METHB 0.3   O2HB 87.3*   COHB 1.8*   O2CON 19.6   HHB 10.6*   THB 16.0               Recent Films:  CTA PULMONARY W CONTRAST   Final Result   1. There is no pulmonary embolus   2. Advanced centrilobular emphysematous changes   3. Left lower lobe pneumonia   4. Multiple areas of chronic pleuroparenchymal scarring seen within the right   and left lungs more prominent within the right lung base laterally.         XR CHEST

## 2024-05-01 NOTE — PLAN OF CARE
Problem: Discharge Planning  Goal: Discharge to home or other facility with appropriate resources  Outcome: Progressing  Flowsheets  Taken 4/30/2024 2146 by Sandy Em RN  Discharge to home or other facility with appropriate resources:   Identify barriers to discharge with patient and caregiver   Arrange for needed discharge resources and transportation as appropriate  Taken 4/30/2024 1543 by Ezequiel Lockett RN  Discharge to home or other facility with appropriate resources:   Identify barriers to discharge with patient and caregiver   Refer to discharge planning if patient needs post-hospital services based on physician order or complex needs related to functional status, cognitive ability or social support system     Problem: Safety - Adult  Goal: Free from fall injury  Outcome: Progressing     Problem: Chronic Conditions and Co-morbidities  Goal: Patient's chronic conditions and co-morbidity symptoms are monitored and maintained or improved  Outcome: Progressing  Flowsheets  Taken 4/30/2024 2146 by Sandy Em RN  Care Plan - Patient's Chronic Conditions and Co-Morbidity Symptoms are Monitored and Maintained or Improved:   Monitor and assess patient's chronic conditions and comorbid symptoms for stability, deterioration, or improvement   Collaborate with multidisciplinary team to address chronic and comorbid conditions and prevent exacerbation or deterioration  Taken 4/30/2024 1543 by Ezequiel Lockett RN  Care Plan - Patient's Chronic Conditions and Co-Morbidity Symptoms are Monitored and Maintained or Improved:   Monitor and assess patient's chronic conditions and comorbid symptoms for stability, deterioration, or improvement   Collaborate with multidisciplinary team to address chronic and comorbid conditions and prevent exacerbation or deterioration   Update acute care plan with appropriate goals if chronic or comorbid symptoms are exacerbated and prevent overall improvement and discharge      Problem: Pain  Goal: Verbalizes/displays adequate comfort level or baseline comfort level  Outcome: Progressing     Problem: Respiratory - Adult  Goal: Achieves optimal ventilation and oxygenation  Outcome: Progressing     Problem: Discharge Planning  Goal: Discharge to home or other facility with appropriate resources  Outcome: Progressing  Flowsheets  Taken 4/30/2024 2146 by Sandy Em RN  Discharge to home or other facility with appropriate resources:   Identify barriers to discharge with patient and caregiver   Arrange for needed discharge resources and transportation as appropriate  Taken 4/30/2024 1543 by Ezequiel Lockett RN  Discharge to home or other facility with appropriate resources:   Identify barriers to discharge with patient and caregiver   Refer to discharge planning if patient needs post-hospital services based on physician order or complex needs related to functional status, cognitive ability or social support system     Problem: Safety - Adult  Goal: Free from fall injury  Outcome: Progressing     Problem: Chronic Conditions and Co-morbidities  Goal: Patient's chronic conditions and co-morbidity symptoms are monitored and maintained or improved  Outcome: Progressing  Flowsheets  Taken 4/30/2024 2146 by Sandy Em RN  Care Plan - Patient's Chronic Conditions and Co-Morbidity Symptoms are Monitored and Maintained or Improved:   Monitor and assess patient's chronic conditions and comorbid symptoms for stability, deterioration, or improvement   Collaborate with multidisciplinary team to address chronic and comorbid conditions and prevent exacerbation or deterioration  Taken 4/30/2024 1543 by Ezequiel Lockett RN  Care Plan - Patient's Chronic Conditions and Co-Morbidity Symptoms are Monitored and Maintained or Improved:   Monitor and assess patient's chronic conditions and comorbid symptoms for stability, deterioration, or improvement   Collaborate with multidisciplinary team to

## 2024-05-01 NOTE — PROGRESS NOTES
[facility] Hospitalist Progress Note    Admitting Date and Time: 4/28/2024 11:50 AM  Admit Dx: COPD exacerbation (HCC) [J44.1]  Acute respiratory failure with hypoxia (HCC) [J96.01]  Pneumonia due to infectious organism, unspecified laterality, unspecified part of lung [J18.9]    Subjective:  Patient is being followed for COPD exacerbation (HCC) [J44.1]  Acute respiratory failure with hypoxia (HCC) [J96.01]  Pneumonia due to infectious organism, unspecified laterality, unspecified part of lung [J18.9]   Pt feels okay  Per RN: no additional concerns    ROS: denies fever, chills, cp, sob, n/v, HA unless stated above.      ipratropium 0.5 mg-albuterol 2.5 mg  1 Dose Inhalation Q4H RT    [Held by provider] cefepime  2,000 mg IntraVENous Q12H    sodium chloride flush  5-40 mL IntraVENous 2 times per day    enoxaparin  40 mg SubCUTAneous Daily    azithromycin  500 mg Oral Once per day on Mon Wed Fri    budesonide  500 mcg Nebulization BID RT    bumetanide  1 mg Oral Once per day on Mon Wed Fri    arformoterol tartrate  15 mcg Nebulization BID RT    midodrine  2.5 mg Oral TID     sodium chloride flush, 5-40 mL, PRN  sodium chloride, , PRN  potassium chloride, 40 mEq, PRN   Or  potassium alternative oral replacement, 40 mEq, PRN   Or  potassium chloride, 10 mEq, PRN  magnesium sulfate, 2,000 mg, PRN  ondansetron, 4 mg, Q8H PRN   Or  ondansetron, 4 mg, Q6H PRN  polyethylene glycol, 17 g, Daily PRN  acetaminophen, 650 mg, Q6H PRN   Or  acetaminophen, 650 mg, Q6H PRN  melatonin, 3 mg, Nightly PRN  ipratropium 0.5 mg-albuterol 2.5 mg, 1 Dose, Q4H PRN  oxyCODONE-acetaminophen, 1 tablet, BID PRN   And  oxyCODONE, 2.5 mg, BID PRN         Objective:  /86   Pulse 77   Temp 98.2 °F (36.8 °C) (Temporal)   Resp 18   Ht 1.829 m (6')   Wt 82.6 kg (182 lb 1.6 oz)   SpO2 96%   BMI 24.70 kg/m²     General Appearance: alert and oriented to person, place and time and in NAD, sitting in bed comfortably eating  Skin: warm

## 2024-05-01 NOTE — CARE COORDINATION
Chart review - Currently on 7L O2 HFNC. Scheduled for bronchoscopy today with Dr. Little. ID is following. IV bumex, IV cefepime, IV solumedrol, nebs. Home oxygen supplier is [employer], has life vest. Anticipated to discharge home with no needs. Will continue to follow.   Charito SUAREZN, RN  Case Management

## 2024-05-01 NOTE — ANESTHESIA PRE PROCEDURE
Department of Anesthesiology  Preprocedure Note       Name:  Jason Cox   Age:  73 y.o.  :  1950                                          MRN:  [id number]         Date:  2024      Surgeon: Surgeon(s):  Rolf Little MD    Procedure: Procedure(s):  BRONCHOSCOPY DIAGNOSTIC    Medications prior to admission:   Prior to Admission medications    Medication Sig Start Date End Date Taking? Authorizing Provider   bumetanide (BUMEX) 1 MG tablet Take 1 tablet by mouth three times a week **    Darrell Marquez MD   midodrine (PROAMATINE) 2.5 MG tablet Take 1 tablet by mouth 3 times daily  Patient not taking: Reported on 2024 3/5/24   Juni Loyd MD   ipratropium 0.5 mg-albuterol 2.5 mg (DUONEB) 0.5-2.5 (3) MG/3ML SOLN nebulizer solution Inhale 3 mLs into the lungs every 6 hours as needed for Shortness of Breath 24   Alejandro Sargent MD   azithromycin (ZITHROMAX) 500 MG tablet Take 1 tablet by mouth three times a week **    Darrell Marquez MD   budesonide (PULMICORT) 0.5 MG/2ML nebulizer suspension Take 2 mLs by nebulization in the morning and 2 mLs in the evening.    Darrell Marquez MD   OXYGEN Inhale 5 L/min into the lungs continuous    Darrell Marquez MD   formoterol (PERFOROMIST) 20 MCG/2ML nebulizer solution Take 2 mLs by nebulization 2 times daily 22   Dea Cason APRN - JOY   oxyCODONE-acetaminophen (PERCOCET) 7.5-325 MG per tablet Take 1 tablet by mouth 2 times daily as needed for Pain.    Darrell Marquez MD       Current medications:    Current Facility-Administered Medications   Medication Dose Route Frequency Provider Last Rate Last Admin    ipratropium 0.5 mg-albuterol 2.5 mg (DUONEB) nebulizer solution 1 Dose  1 Dose Inhalation Q4H RT Rolf Little MD   1 Dose at 24 0739    [Held by provider] ceFEPIme (MAXIPIME) 2,000 mg in sodium chloride 0.9 % 100 mL IVPB  2,000 mg IntraVENous Q12H Korey Duque MD 25

## 2024-05-01 NOTE — PROGRESS NOTES
[facility]  [facility]  Progress Note    SUBJECTIVE:  Chief Complaint   Patient presents with    Shortness of Breath     HX of COPD. On O2     Patient is tolerating medications. No reported adverse drug reactions.  No nausea, vomiting, diarrhea.  Tmax 99   On 7LNC today   Going down for bronch this afternoon     Review of systems:  As stated above in the chief complaint, otherwise negative.    Medications:  Scheduled Meds:   ipratropium 0.5 mg-albuterol 2.5 mg  1 Dose Inhalation Q4H RT    [Held by provider] cefepime  2,000 mg IntraVENous Q12H    sodium chloride flush  5-40 mL IntraVENous 2 times per day    enoxaparin  40 mg SubCUTAneous Daily    azithromycin  500 mg Oral Once per day on     budesonide  500 mcg Nebulization BID RT    bumetanide  1 mg Oral Once per day on     arformoterol tartrate  15 mcg Nebulization BID RT    midodrine  2.5 mg Oral TID     Continuous Infusions:   sodium chloride       PRN Meds:sodium chloride flush, sodium chloride, potassium chloride **OR** potassium alternative oral replacement **OR** potassium chloride, magnesium sulfate, ondansetron **OR** ondansetron, polyethylene glycol, acetaminophen **OR** acetaminophen, melatonin, ipratropium 0.5 mg-albuterol 2.5 mg, oxyCODONE-acetaminophen **AND** oxyCODONE    OBJECTIVE:  /76   Pulse 71   Temp 99 °F (37.2 °C) (Oral)   Resp 16   Ht 1.829 m (6')   Wt 82.6 kg (182 lb 1.6 oz)   SpO2 91%   BMI 24.70 kg/m²   Temp  Av.1 °F (36.7 °C)  Min: 97.8 °F (36.6 °C)  Max: 99 °F (37.2 °C)  Constitutional: The patient is awake, alert, and oriented. In no distress  Skin: Warm and dry. No rashes were noted.   HEENT: Round and reactive pupils.  Moist mucous membranes.  No ulcerations or thrush.  Neck: Supple to movements.   Chest: No use of accessory muscles to breathe. Symmetrical expansion. Crackles left base. Exp wheezes throughout   Cardiovascular: S1 and S2 are rhythmic and regular. No

## 2024-05-02 ENCOUNTER — APPOINTMENT (OUTPATIENT)
Dept: GENERAL RADIOLOGY | Age: 74
DRG: 166 | End: 2024-05-02
Payer: MEDICARE

## 2024-05-02 PROBLEM — J15.9 BACTERIAL PNEUMONIA: Status: ACTIVE | Noted: 2024-05-02

## 2024-05-02 PROBLEM — B34.2 CORONAVIRUS INFECTION: Status: ACTIVE | Noted: 2024-05-02

## 2024-05-02 LAB
ANION GAP SERPL CALCULATED.3IONS-SCNC: 14 MMOL/L (ref 7–16)
APPEARANCE BRONCH: NORMAL
APPEARANCE FLD: NORMAL
BASOPHILS # BLD: 0.03 K/UL (ref 0–0.2)
BASOPHILS NFR BLD: 0 % (ref 0–2)
BLASTS NFR FLD: NORMAL %
BODY FLD TYPE: NORMAL
BUN SERPL-MCNC: 14 MG/DL (ref 6–23)
CALCIUM SERPL-MCNC: 9.5 MG/DL (ref 8.6–10.2)
CHLORIDE SERPL-SCNC: 97 MMOL/L (ref 98–107)
CLOT CHECK: NORMAL
CLOT CHECK: NORMAL
CO2 SERPL-SCNC: 22 MMOL/L (ref 22–29)
COLOR BRONCH: COLORLESS
COLOR FLD: NORMAL
CREAT SERPL-MCNC: 0.7 MG/DL (ref 0.7–1.2)
EOSINOPHIL # BLD: 0.23 K/UL (ref 0.05–0.5)
EOSINOPHIL NFR FLD: NORMAL %
EOSINOPHILS RELATIVE PERCENT: 2 % (ref 0–6)
ERYTHROCYTE [DISTWIDTH] IN BLOOD BY AUTOMATED COUNT: 14 % (ref 11.5–15)
GFR, ESTIMATED: >90 ML/MIN/1.73M2
GLUCOSE SERPL-MCNC: 73 MG/DL (ref 74–99)
HCT VFR BLD AUTO: 51 % (ref 37–54)
HGB BLD-MCNC: 16.7 G/DL (ref 12.5–16.5)
IMM GRANULOCYTES # BLD AUTO: 0.05 K/UL (ref 0–0.58)
IMM GRANULOCYTES NFR BLD: 0 % (ref 0–5)
LYMPHOCYTES NFR BLD: 0.87 K/UL (ref 1.5–4)
LYMPHOCYTES NFR FLD: NORMAL %
LYMPHOCYTES RELATIVE PERCENT: 8 % (ref 20–42)
LYMPHOCYTES, BAL: 11 %
MACROPHAGES, BAL: 4 %
MANUAL DIF COMMENT FLD-IMP: NORMAL
MCH RBC QN AUTO: 30.8 PG (ref 26–35)
MCHC RBC AUTO-ENTMCNC: 32.7 G/DL (ref 32–34.5)
MCV RBC AUTO: 94.1 FL (ref 80–99.9)
MESOTHELIAL CELLS BODY FLUID: NORMAL %
MICROORGANISM SPEC CULT: NORMAL
MICROORGANISM SPEC CULT: NORMAL
MICROORGANISM/AGENT SPEC: NORMAL
MONOCYTES NFR BLD: 0.67 K/UL (ref 0.1–0.95)
MONOCYTES NFR BLD: 6 % (ref 2–12)
MONOCYTES NFR FLD: NORMAL %
NEUTROPHILS NFR BLD: 84 % (ref 43–80)
NEUTROPHILS NFR FLD: NORMAL %
NEUTS SEG NFR BLD: 9.69 K/UL (ref 1.8–7.3)
NUC CELL # FLD: NORMAL CELLS/UL
PLATELET # BLD AUTO: 185 K/UL (ref 130–450)
PMV BLD AUTO: 10 FL (ref 7–12)
POTASSIUM SERPL-SCNC: 3.6 MMOL/L (ref 3.5–5)
RBC # BLD AUTO: 5.42 M/UL (ref 3.8–5.8)
RBC # FLD: NORMAL CELLS/UL
RBC, BAL: 2070 CELLS/UL
SEGMENTED NEUTROPHILS, BAL: 85 %
SODIUM SERPL-SCNC: 133 MMOL/L (ref 132–146)
SPECIMEN DESCRIPTION: NORMAL
SPECIMEN DESCRIPTION: NORMAL
SPECIMEN TYPE: NORMAL
TOTAL CELLS COUNTED BRONCH: 630 CELLS/UL
UNIDENT CELLS NFR FLD: NORMAL %
WBC # FLD: NORMAL CELLS/UL
WBC OTHER # BLD: 11.5 K/UL (ref 4.5–11.5)

## 2024-05-02 PROCEDURE — 94668 MNPJ CHEST WALL SBSQ: CPT

## 2024-05-02 PROCEDURE — 6360000002 HC RX W HCPCS: Performed by: SPECIALIST

## 2024-05-02 PROCEDURE — 2060000000 HC ICU INTERMEDIATE R&B

## 2024-05-02 PROCEDURE — 71045 X-RAY EXAM CHEST 1 VIEW: CPT

## 2024-05-02 PROCEDURE — 6370000000 HC RX 637 (ALT 250 FOR IP)

## 2024-05-02 PROCEDURE — 85025 COMPLETE CBC W/AUTO DIFF WBC: CPT

## 2024-05-02 PROCEDURE — 94640 AIRWAY INHALATION TREATMENT: CPT

## 2024-05-02 PROCEDURE — 6360000002 HC RX W HCPCS: Performed by: STUDENT IN AN ORGANIZED HEALTH CARE EDUCATION/TRAINING PROGRAM

## 2024-05-02 PROCEDURE — 6370000000 HC RX 637 (ALT 250 FOR IP): Performed by: INTERNAL MEDICINE

## 2024-05-02 PROCEDURE — 6370000000 HC RX 637 (ALT 250 FOR IP): Performed by: STUDENT IN AN ORGANIZED HEALTH CARE EDUCATION/TRAINING PROGRAM

## 2024-05-02 PROCEDURE — 2700000000 HC OXYGEN THERAPY PER DAY

## 2024-05-02 PROCEDURE — 99232 SBSQ HOSP IP/OBS MODERATE 35: CPT | Performed by: INTERNAL MEDICINE

## 2024-05-02 PROCEDURE — 2580000003 HC RX 258: Performed by: SPECIALIST

## 2024-05-02 PROCEDURE — 36415 COLL VENOUS BLD VENIPUNCTURE: CPT

## 2024-05-02 PROCEDURE — 6370000000 HC RX 637 (ALT 250 FOR IP): Performed by: SPECIALIST

## 2024-05-02 PROCEDURE — 2580000003 HC RX 258: Performed by: STUDENT IN AN ORGANIZED HEALTH CARE EDUCATION/TRAINING PROGRAM

## 2024-05-02 PROCEDURE — 80048 BASIC METABOLIC PNL TOTAL CA: CPT

## 2024-05-02 PROCEDURE — 94667 MNPJ CHEST WALL 1ST: CPT

## 2024-05-02 RX ORDER — DOXYCYCLINE HYCLATE 100 MG/1
100 CAPSULE ORAL EVERY 12 HOURS SCHEDULED
Status: DISCONTINUED | OUTPATIENT
Start: 2024-05-02 | End: 2024-05-03

## 2024-05-02 RX ORDER — PREDNISONE 20 MG/1
40 TABLET ORAL DAILY
Status: DISCONTINUED | OUTPATIENT
Start: 2024-05-02 | End: 2024-05-05

## 2024-05-02 RX ADMIN — MIDODRINE HYDROCHLORIDE 2.5 MG: 2.5 TABLET ORAL at 09:08

## 2024-05-02 RX ADMIN — ARFORMOTEROL TARTRATE 15 MCG: 15 SOLUTION RESPIRATORY (INHALATION) at 20:09

## 2024-05-02 RX ADMIN — BUDESONIDE INHALATION 500 MCG: 0.5 SUSPENSION RESPIRATORY (INHALATION) at 07:55

## 2024-05-02 RX ADMIN — CEFEPIME 2000 MG: 2 INJECTION, POWDER, FOR SOLUTION INTRAVENOUS at 17:45

## 2024-05-02 RX ADMIN — SODIUM CHLORIDE, PRESERVATIVE FREE 10 ML: 5 INJECTION INTRAVENOUS at 09:00

## 2024-05-02 RX ADMIN — IPRATROPIUM BROMIDE AND ALBUTEROL SULFATE 1 DOSE: 2.5; .5 SOLUTION RESPIRATORY (INHALATION) at 11:27

## 2024-05-02 RX ADMIN — ENOXAPARIN SODIUM 40 MG: 100 INJECTION SUBCUTANEOUS at 08:59

## 2024-05-02 RX ADMIN — IPRATROPIUM BROMIDE AND ALBUTEROL SULFATE 1 DOSE: 2.5; .5 SOLUTION RESPIRATORY (INHALATION) at 15:25

## 2024-05-02 RX ADMIN — ARFORMOTEROL TARTRATE 15 MCG: 15 SOLUTION RESPIRATORY (INHALATION) at 07:55

## 2024-05-02 RX ADMIN — ACETAMINOPHEN 650 MG: 325 TABLET ORAL at 08:59

## 2024-05-02 RX ADMIN — PREDNISONE 40 MG: 20 TABLET ORAL at 13:13

## 2024-05-02 RX ADMIN — IPRATROPIUM BROMIDE AND ALBUTEROL SULFATE 1 DOSE: 2.5; .5 SOLUTION RESPIRATORY (INHALATION) at 00:19

## 2024-05-02 RX ADMIN — SODIUM CHLORIDE, PRESERVATIVE FREE 10 ML: 5 INJECTION INTRAVENOUS at 20:51

## 2024-05-02 RX ADMIN — IPRATROPIUM BROMIDE AND ALBUTEROL SULFATE 1 DOSE: 2.5; .5 SOLUTION RESPIRATORY (INHALATION) at 20:09

## 2024-05-02 RX ADMIN — BUDESONIDE INHALATION 500 MCG: 0.5 SUSPENSION RESPIRATORY (INHALATION) at 20:09

## 2024-05-02 RX ADMIN — DOXYCYCLINE HYCLATE 100 MG: 100 CAPSULE ORAL at 20:48

## 2024-05-02 RX ADMIN — CEFEPIME 2000 MG: 2 INJECTION, POWDER, FOR SOLUTION INTRAVENOUS at 05:11

## 2024-05-02 RX ADMIN — MIDODRINE HYDROCHLORIDE 2.5 MG: 2.5 TABLET ORAL at 20:48

## 2024-05-02 RX ADMIN — MIDODRINE HYDROCHLORIDE 2.5 MG: 2.5 TABLET ORAL at 14:17

## 2024-05-02 RX ADMIN — IPRATROPIUM BROMIDE AND ALBUTEROL SULFATE 1 DOSE: 2.5; .5 SOLUTION RESPIRATORY (INHALATION) at 07:55

## 2024-05-02 ASSESSMENT — PAIN DESCRIPTION - DESCRIPTORS: DESCRIPTORS: ACHING;DISCOMFORT;DULL

## 2024-05-02 ASSESSMENT — PAIN DESCRIPTION - LOCATION: LOCATION: HEAD

## 2024-05-02 ASSESSMENT — PAIN SCALES - GENERAL
PAINLEVEL_OUTOF10: 3
PAINLEVEL_OUTOF10: 0

## 2024-05-02 ASSESSMENT — PAIN - FUNCTIONAL ASSESSMENT: PAIN_FUNCTIONAL_ASSESSMENT: ACTIVITIES ARE NOT PREVENTED

## 2024-05-02 NOTE — PROGRESS NOTES
[facility] Hospitalist   Progress Note    Admitting Date and Time: 4/28/2024 11:50 AM  Admit Dx: COPD exacerbation (HCC) [J44.1]  Acute respiratory failure with hypoxia (HCC) [J96.01]  Pneumonia due to infectious organism, unspecified laterality, unspecified part of lung [J18.9]    Subjective:    Patient was admitted with COPD exacerbation (HCC) [J44.1]  Acute respiratory failure with hypoxia (HCC) [J96.01]  Pneumonia due to infectious organism, unspecified laterality, unspecified part of lung [J18.9]. Patient denies fever, chills, cp, sob, n/v.     predniSONE  40 mg Oral Daily    doxycycline hyclate  100 mg Oral 2 times per day    ipratropium 0.5 mg-albuterol 2.5 mg  1 Dose Inhalation Q4H RT    cefepime  2,000 mg IntraVENous Q12H    sodium chloride flush  5-40 mL IntraVENous 2 times per day    enoxaparin  40 mg SubCUTAneous Daily    azithromycin  500 mg Oral Once per day on Mon Wed Fri    budesonide  500 mcg Nebulization BID RT    bumetanide  1 mg Oral Once per day on Mon Wed Fri    arformoterol tartrate  15 mcg Nebulization BID RT    midodrine  2.5 mg Oral TID     sodium chloride flush, 5-40 mL, PRN  sodium chloride, , PRN  potassium chloride, 40 mEq, PRN   Or  potassium alternative oral replacement, 40 mEq, PRN   Or  potassium chloride, 10 mEq, PRN  magnesium sulfate, 2,000 mg, PRN  ondansetron, 4 mg, Q8H PRN   Or  ondansetron, 4 mg, Q6H PRN  polyethylene glycol, 17 g, Daily PRN  acetaminophen, 650 mg, Q6H PRN   Or  acetaminophen, 650 mg, Q6H PRN  melatonin, 3 mg, Nightly PRN  ipratropium 0.5 mg-albuterol 2.5 mg, 1 Dose, Q4H PRN  oxyCODONE-acetaminophen, 1 tablet, BID PRN   And  oxyCODONE, 2.5 mg, BID PRN         Objective:    /78   Pulse (!) 103   Temp 98 °F (36.7 °C) (Oral)   Resp 20   Ht 1.829 m (6')   Wt 80 kg (176 lb 5.9 oz)   SpO2 (!) 83%   BMI 23.92 kg/m²   Skin: warm and dry, no rash or erythema  Pulmonary/Chest: clear to auscultation bilaterally- no wheezes, rales or  CONTRAST   Final Result   1. There is no pulmonary embolus   2. Advanced centrilobular emphysematous changes   3. Left lower lobe pneumonia   4. Multiple areas of chronic pleuroparenchymal scarring seen within the right   and left lungs more prominent within the right lung base laterally.         XR CHEST PORTABLE   Final Result   1. Bibasilar airspace disease   2. Small right pleural effusion   3. Possible atelectasis within the right lung base   4. Emphysematous changes   5. CT of the chest is pending.         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 *      Plan:  Acute on chronic respiratory failure with hypoxia([id number]%)POA wean o2 as able  pulm following  Copd exac nebs and steroids  Coronavirus infection with possible pneumonia monitor  Probable pneumonia bacterial vs fungal  monitor on abx        Electronically signed by Mayur Ly DO on 5/2/2024 at 3:30 PM

## 2024-05-02 NOTE — PROGRESS NOTES
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 Patient admits he was having shortness of breath at rest last night. He feels he is able to take a deeper breath today. Admits to wheezing today.       Vitals:  VITALS:  /78   Pulse (!) 103   Temp 98 °F (36.7 °C) (Oral)   Resp 20   Ht 1.829 m (6')   Wt 80 kg (176 lb 5.9 oz)   SpO2 (!) 83%   BMI 23.92 kg/m²     24HR INTAKE/OUTPUT:      Intake/Output Summary (Last 24 hours) at 2024 1134  Last data filed at 2024 0257  Gross per 24 hour   Intake 260 ml   Output 1500 ml   Net -1240 ml         24HR PULSE OXIMETRY RANGE:    SpO2  Av.5 %  Min: 74 %  Max: 96 %      Medications:  IV:   sodium chloride         Scheduled Meds:   ipratropium 0.5 mg-albuterol 2.5 mg  1 Dose Inhalation Q4H RT    cefepime  2,000 mg IntraVENous Q12H    sodium chloride flush  5-40 mL IntraVENous 2 times per day    enoxaparin  40 mg SubCUTAneous Daily    azithromycin  500 mg Oral Once per day on     budesonide  500 mcg Nebulization BID RT    bumetanide  1 mg Oral Once per day on      arformoterol tartrate  15 mcg Nebulization BID RT    midodrine  2.5 mg Oral TID       Diet:   ADULT DIET; Regular     EXAM:  General: No distress. Alert.  Eyes: PERRL. No sclera icterus. No conjunctival injection.  ENT: No discharge. Pharynx clear.  Neck: Trachea midline. Normal thyroid.  Resp: No accessory muscle use. no rales. scattered wheezing. no rhonchi.   CV: Regular rate. Regular rhythm. No murmur or rub.   Abd: Non-tender. Non-distended. No masses. No organomegaly. Normal bowel sounds.   Skin: Warm and dry. No nodule on exposed extremities. No rash on exposed extremities.  Ext: No cyanosis, clubbing, edema  Lymph: No cervical LAD. No supraclavicular LAD.   M/S: No cyanosis. No joint deformity. No clubbing.   Neuro: Awake. Follows commands. Positive pupils/gag/corneals. Normal pain response.       Results:  CBC:   Recent Labs     04/30/24  0343 05/01/24  0539 05/02/24  0603   WBC 11.9* 8.5 11.5   HGB 15.0 15.4 16.7*   HCT 45.4 47.6 51.0   MCV 92.8 96.9 94.1    201 185       BMP:   Recent Labs     04/30/24 0343 05/01/24  0539 05/02/24  0603    139 133   K 3.9 4.0 3.6    103 97*   CO2 24 25 22   BUN 11 11 14   CREATININE 0.8 0.7 0.7       LIVER PROFILE:   No results for input(s): \"AST\", \"ALT\", \"LIPASE\", \"AMYLASE\", \"BILIDIR\", \"BILITOT\", \"ALKPHOS\" in the last 72 hours.    Invalid input(s): \"ALB\"    PT/INR: No results for input(s): \"PROTIME\", \"INR\" in the last 72 hours.  APTT: No results for input(s): \"APTT\" in the last 72 hours.      Pathology:  N/A      Microbiology:  None    Recent ABG:   No results for input(s): \"PH\", \"PO2\", \"PCO2\", \"HCO3\", \"BE\", \"O2SAT\", \"METHB\", \"O2HB\", \"COHB\", \"O2CON\", \"HHB\", \"THB\" in the last 72 hours.            Recent Films:  CTA PULMONARY W CONTRAST   Final Result   1. There is no pulmonary embolus   2. Advanced centrilobular emphysematous changes   3. Left lower lobe pneumonia   4. Multiple areas of chronic pleuroparenchymal scarring seen within the right   and

## 2024-05-02 NOTE — PLAN OF CARE
Problem: Discharge Planning  Goal: Discharge to home or other facility with appropriate resources  Outcome: Progressing     Problem: Safety - Adult  Goal: Free from fall injury  Outcome: Progressing     Problem: Chronic Conditions and Co-morbidities  Goal: Patient's chronic conditions and co-morbidity symptoms are monitored and maintained or improved  Outcome: Progressing     Problem: Pain  Goal: Verbalizes/displays adequate comfort level or baseline comfort level  Outcome: Progressing  Flowsheets (Taken 5/1/2024 2021)  Verbalizes/displays adequate comfort level or baseline comfort level: Assess pain using appropriate pain scale     Problem: Respiratory - Adult  Goal: Achieves optimal ventilation and oxygenation  Outcome: Progressing     Problem: Discharge Planning  Goal: Discharge to home or other facility with appropriate resources  Outcome: Progressing     Problem: Safety - Adult  Goal: Free from fall injury  Outcome: Progressing     Problem: Chronic Conditions and Co-morbidities  Goal: Patient's chronic conditions and co-morbidity symptoms are monitored and maintained or improved  Outcome: Progressing     Problem: Pain  Goal: Verbalizes/displays adequate comfort level or baseline comfort level  Outcome: Progressing  Flowsheets (Taken 5/1/2024 2021)  Verbalizes/displays adequate comfort level or baseline comfort level: Assess pain using appropriate pain scale     Problem: Respiratory - Adult  Goal: Achieves optimal ventilation and oxygenation  Outcome: Progressing

## 2024-05-02 NOTE — PROGRESS NOTES
[facility]  [facility]  Progress Note    SUBJECTIVE:  Chief Complaint   Patient presents with    Shortness of Breath     HX of COPD. On O2     Patient is tolerating medications. No reported adverse drug reactions.  No nausea, vomiting, diarrhea.  Patient is resting in bed, reports he is feeling well  Reports he is not coughing as much today, currently on 6 L nasal cannula  No fevers overnight    Review of systems:  As stated above in the chief complaint, otherwise negative.    Medications:  Scheduled Meds:   ipratropium 0.5 mg-albuterol 2.5 mg  1 Dose Inhalation Q4H RT    cefepime  2,000 mg IntraVENous Q12H    sodium chloride flush  5-40 mL IntraVENous 2 times per day    enoxaparin  40 mg SubCUTAneous Daily    azithromycin  500 mg Oral Once per day on     budesonide  500 mcg Nebulization BID RT    bumetanide  1 mg Oral Once per day on     arformoterol tartrate  15 mcg Nebulization BID RT    midodrine  2.5 mg Oral TID     Continuous Infusions:   sodium chloride       PRN Meds:sodium chloride flush, sodium chloride, potassium chloride **OR** potassium alternative oral replacement **OR** potassium chloride, magnesium sulfate, ondansetron **OR** ondansetron, polyethylene glycol, acetaminophen **OR** acetaminophen, melatonin, ipratropium 0.5 mg-albuterol 2.5 mg, oxyCODONE-acetaminophen **AND** oxyCODONE    OBJECTIVE:  /75   Pulse 94   Temp 98.2 °F (36.8 °C) (Oral)   Resp 22   Ht 1.829 m (6')   Wt 80 kg (176 lb 5.9 oz)   SpO2 (!) 85%   BMI 23.92 kg/m²   Temp  Av.1 °F (36.7 °C)  Min: 97.7 °F (36.5 °C)  Max: 99 °F (37.2 °C)  Constitutional: The patient is awake, alert, and oriented. In no distress.  Skin: Warm and dry. No rashes were noted.   HEENT: Round and reactive pupils.  Moist mucous membranes.  No ulcerations or thrush.  Neck: Supple to movements.   Chest: No use of accessory muscles to breathe. Symmetrical expansion. Crackles left base. Exp  wheezes throughout   Cardiovascular: S1 and S2 are rhythmic and regular. No murmurs appreciated.   Abdomen: Positive bowel sounds to auscultation. Benign to palpation.   Extremities: No edema.  Lines: Peripheral.    Laboratory and Tests:  Lab Results   Component Value Date    CRP 5.0 04/29/2024    CRP 3.0 03/01/2024    CRP 31.0 (H) 01/20/2024     Lab Results   Component Value Date    SEDRATE 0 04/29/2024    SEDRATE 1 01/20/2024    SEDRATE 1 01/14/2024       Radiology:  Reviewed     Microbiology:   Streptococcus pneumoniae/Legionella urine Ag: negative  RVP: Coronavirus NL 63  Respiratory culture (bronchoscopy) 5/1: Pending    Recent Labs     04/29/24  1813   PROCAL 0.04       ASSESSMENT:  Exacerbation COPD  Coronavirus NL 63 infection with possible viral pneumonia  Probable superimposed bacterial versus fungal left lower lobe pneumonia/HCAP  Mild leukocytosis  History of MAC infection, currently on suppressive Azithromycin  S/p bronchoscopy 5/1    PLAN:  Continue IV Cefepime 2g every 12 hours  Azithromycin suppression  Check final cultures from bronchoscopy   Monitor labs  Will continue to follow     Jovita Patton, APRN - CNP  9:37 AM  5/2/2024    Patient seen and examined. I had a face to face encounter with the patient. Agree with exam.  Assessment and plan as outlined above and directed by me. Addition and corrections were done as deemed appropriate. My exam and plan include: The patient underwent bronchoscopy.  There was prominent mucous plugging.  These were sent for cultures.  He is still somewhat dyspneic.  Cultures are pending.  Continue Cefepime.  Will add Doxycycline.    Korey Duque MD  5/2/2024  12:44 PM

## 2024-05-02 NOTE — FLOWSHEET NOTE
Upon returning from bathroom, patient found SOB tripoding, seated at bedside. Stayed with patient until recovered, safety alarm turned on on bed

## 2024-05-02 NOTE — CARE COORDINATION
Met with patient. Currently on 6L O2 HFNC. Pulmonolgy is following s/p bronchoscopy 5/1/24 cx's pending. ID is following IV bumex, IV cefepime, PO zitromax, PO prednisone, nebs. Home oxygen supplier is [email or web address], has life vest. Anticipated to discharge home with no needs. Will continue to follow.   Charito SUAREZN, RN  Case Management

## 2024-05-02 NOTE — PLAN OF CARE
Problem: Discharge Planning  Goal: Discharge to home or other facility with appropriate resources  5/2/2024 1112 by Nannette Zavaleta RN  Outcome: Progressing  Flowsheets (Taken 5/2/2024 0900)  Discharge to home or other facility with appropriate resources: Identify barriers to discharge with patient and caregiver  5/1/2024 2158 by Sandy Em, RN  Outcome: Progressing     Problem: Safety - Adult  Goal: Free from fall injury  5/2/2024 1112 by Nannette Zavaleta RN  Outcome: Progressing  Flowsheets (Taken 5/2/2024 0900)  Free From Fall Injury: Instruct family/caregiver on patient safety  5/1/2024 2158 by Sandy Em, RN  Outcome: Progressing     Problem: Chronic Conditions and Co-morbidities  Goal: Patient's chronic conditions and co-morbidity symptoms are monitored and maintained or improved  5/2/2024 1112 by Nannette Zavaleta RN  Outcome: Progressing  Flowsheets (Taken 5/2/2024 0900)  Care Plan - Patient's Chronic Conditions and Co-Morbidity Symptoms are Monitored and Maintained or Improved: Monitor and assess patient's chronic conditions and comorbid symptoms for stability, deterioration, or improvement  5/1/2024 2158 by Sandy Em, RN  Outcome: Progressing     Problem: Pain  Goal: Verbalizes/displays adequate comfort level or baseline comfort level  5/2/2024 1112 by Nannette Zavaleta RN  Outcome: Progressing  Flowsheets (Taken 5/2/2024 0859)  Verbalizes/displays adequate comfort level or baseline comfort level:   Encourage patient to monitor pain and request assistance   Assess pain using appropriate pain scale   Administer analgesics based on type and severity of pain and evaluate response   Implement non-pharmacological measures as appropriate and evaluate response   Consider cultural and social influences on pain and pain management   Notify Licensed Independent Practitioner if interventions unsuccessful or patient reports new pain  5/1/2024 2158 by Sandy Em, RN  Outcome: Progressing  Flowsheets

## 2024-05-03 LAB
ANION GAP SERPL CALCULATED.3IONS-SCNC: 9 MMOL/L (ref 7–16)
BASOPHILS # BLD: 0.01 K/UL (ref 0–0.2)
BASOPHILS NFR BLD: 0 % (ref 0–2)
BUN SERPL-MCNC: 15 MG/DL (ref 6–23)
CALCIUM SERPL-MCNC: 9.6 MG/DL (ref 8.6–10.2)
CHLORIDE SERPL-SCNC: 100 MMOL/L (ref 98–107)
CO2 SERPL-SCNC: 27 MMOL/L (ref 22–29)
CREAT SERPL-MCNC: 0.7 MG/DL (ref 0.7–1.2)
EOSINOPHIL # BLD: 0.01 K/UL (ref 0.05–0.5)
EOSINOPHILS RELATIVE PERCENT: 0 % (ref 0–6)
ERYTHROCYTE [DISTWIDTH] IN BLOOD BY AUTOMATED COUNT: 13.7 % (ref 11.5–15)
GFR, ESTIMATED: >90 ML/MIN/1.73M2
GLUCOSE SERPL-MCNC: 127 MG/DL (ref 74–99)
HCT VFR BLD AUTO: 50.6 % (ref 37–54)
HGB BLD-MCNC: 16.6 G/DL (ref 12.5–16.5)
IMM GRANULOCYTES # BLD AUTO: 0.04 K/UL (ref 0–0.58)
IMM GRANULOCYTES NFR BLD: 0 % (ref 0–5)
LYMPHOCYTES NFR BLD: 0.53 K/UL (ref 1.5–4)
LYMPHOCYTES RELATIVE PERCENT: 6 % (ref 20–42)
MCH RBC QN AUTO: 30.9 PG (ref 26–35)
MCHC RBC AUTO-ENTMCNC: 32.8 G/DL (ref 32–34.5)
MCV RBC AUTO: 94.1 FL (ref 80–99.9)
MONOCYTES NFR BLD: 0.52 K/UL (ref 0.1–0.95)
MONOCYTES NFR BLD: 6 % (ref 2–12)
NEUTROPHILS NFR BLD: 88 % (ref 43–80)
NEUTS SEG NFR BLD: 8.23 K/UL (ref 1.8–7.3)
NON-GYN CYTOLOGY REPORT: NORMAL
PLATELET # BLD AUTO: 191 K/UL (ref 130–450)
PMV BLD AUTO: 9.9 FL (ref 7–12)
POTASSIUM SERPL-SCNC: 3.9 MMOL/L (ref 3.5–5)
RBC # BLD AUTO: 5.38 M/UL (ref 3.8–5.8)
RBC # BLD: NORMAL 10*6/UL
SODIUM SERPL-SCNC: 136 MMOL/L (ref 132–146)
WBC OTHER # BLD: 9.3 K/UL (ref 4.5–11.5)

## 2024-05-03 PROCEDURE — 94669 MECHANICAL CHEST WALL OSCILL: CPT

## 2024-05-03 PROCEDURE — 6360000002 HC RX W HCPCS: Performed by: STUDENT IN AN ORGANIZED HEALTH CARE EDUCATION/TRAINING PROGRAM

## 2024-05-03 PROCEDURE — 99232 SBSQ HOSP IP/OBS MODERATE 35: CPT | Performed by: INTERNAL MEDICINE

## 2024-05-03 PROCEDURE — 80048 BASIC METABOLIC PNL TOTAL CA: CPT

## 2024-05-03 PROCEDURE — 6370000000 HC RX 637 (ALT 250 FOR IP): Performed by: REGISTERED NURSE

## 2024-05-03 PROCEDURE — 6370000000 HC RX 637 (ALT 250 FOR IP): Performed by: INTERNAL MEDICINE

## 2024-05-03 PROCEDURE — 2700000000 HC OXYGEN THERAPY PER DAY

## 2024-05-03 PROCEDURE — 6370000000 HC RX 637 (ALT 250 FOR IP)

## 2024-05-03 PROCEDURE — 2580000003 HC RX 258: Performed by: STUDENT IN AN ORGANIZED HEALTH CARE EDUCATION/TRAINING PROGRAM

## 2024-05-03 PROCEDURE — 94640 AIRWAY INHALATION TREATMENT: CPT

## 2024-05-03 PROCEDURE — 6360000002 HC RX W HCPCS: Performed by: SPECIALIST

## 2024-05-03 PROCEDURE — 85025 COMPLETE CBC W/AUTO DIFF WBC: CPT

## 2024-05-03 PROCEDURE — 6370000000 HC RX 637 (ALT 250 FOR IP): Performed by: STUDENT IN AN ORGANIZED HEALTH CARE EDUCATION/TRAINING PROGRAM

## 2024-05-03 PROCEDURE — 2060000000 HC ICU INTERMEDIATE R&B

## 2024-05-03 PROCEDURE — 6370000000 HC RX 637 (ALT 250 FOR IP): Performed by: SPECIALIST

## 2024-05-03 PROCEDURE — 2580000003 HC RX 258: Performed by: SPECIALIST

## 2024-05-03 RX ADMIN — BUDESONIDE INHALATION 500 MCG: 0.5 SUSPENSION RESPIRATORY (INHALATION) at 07:36

## 2024-05-03 RX ADMIN — ARFORMOTEROL TARTRATE 15 MCG: 15 SOLUTION RESPIRATORY (INHALATION) at 20:48

## 2024-05-03 RX ADMIN — MIDODRINE HYDROCHLORIDE 2.5 MG: 2.5 TABLET ORAL at 08:13

## 2024-05-03 RX ADMIN — LEVOFLOXACIN 750 MG: 500 TABLET, FILM COATED ORAL at 14:19

## 2024-05-03 RX ADMIN — CEFEPIME 2000 MG: 2 INJECTION, POWDER, FOR SOLUTION INTRAVENOUS at 17:23

## 2024-05-03 RX ADMIN — BUMETANIDE 1 MG: 1 TABLET ORAL at 20:18

## 2024-05-03 RX ADMIN — OXYCODONE AND ACETAMINOPHEN 1 TABLET: 5; 325 TABLET ORAL at 20:19

## 2024-05-03 RX ADMIN — IPRATROPIUM BROMIDE AND ALBUTEROL SULFATE 1 DOSE: 2.5; .5 SOLUTION RESPIRATORY (INHALATION) at 15:28

## 2024-05-03 RX ADMIN — SODIUM CHLORIDE, PRESERVATIVE FREE 10 ML: 5 INJECTION INTRAVENOUS at 20:20

## 2024-05-03 RX ADMIN — AZITHROMYCIN 500 MG: 250 TABLET, FILM COATED ORAL at 08:13

## 2024-05-03 RX ADMIN — MIDODRINE HYDROCHLORIDE 2.5 MG: 2.5 TABLET ORAL at 14:19

## 2024-05-03 RX ADMIN — IPRATROPIUM BROMIDE AND ALBUTEROL SULFATE 1 DOSE: 2.5; .5 SOLUTION RESPIRATORY (INHALATION) at 12:13

## 2024-05-03 RX ADMIN — IPRATROPIUM BROMIDE AND ALBUTEROL SULFATE 1 DOSE: 2.5; .5 SOLUTION RESPIRATORY (INHALATION) at 00:08

## 2024-05-03 RX ADMIN — IPRATROPIUM BROMIDE AND ALBUTEROL SULFATE 1 DOSE: 2.5; .5 SOLUTION RESPIRATORY (INHALATION) at 20:48

## 2024-05-03 RX ADMIN — OXYCODONE 2.5 MG: 5 TABLET ORAL at 20:19

## 2024-05-03 RX ADMIN — SODIUM CHLORIDE, PRESERVATIVE FREE 10 ML: 5 INJECTION INTRAVENOUS at 08:13

## 2024-05-03 RX ADMIN — ARFORMOTEROL TARTRATE 15 MCG: 15 SOLUTION RESPIRATORY (INHALATION) at 07:36

## 2024-05-03 RX ADMIN — PREDNISONE 40 MG: 20 TABLET ORAL at 08:13

## 2024-05-03 RX ADMIN — DOXYCYCLINE HYCLATE 100 MG: 100 CAPSULE ORAL at 08:13

## 2024-05-03 RX ADMIN — CEFEPIME 2000 MG: 2 INJECTION, POWDER, FOR SOLUTION INTRAVENOUS at 03:29

## 2024-05-03 RX ADMIN — ENOXAPARIN SODIUM 40 MG: 100 INJECTION SUBCUTANEOUS at 08:12

## 2024-05-03 RX ADMIN — MIDODRINE HYDROCHLORIDE 2.5 MG: 2.5 TABLET ORAL at 20:18

## 2024-05-03 RX ADMIN — IPRATROPIUM BROMIDE AND ALBUTEROL SULFATE 1 DOSE: 2.5; .5 SOLUTION RESPIRATORY (INHALATION) at 07:36

## 2024-05-03 RX ADMIN — BUDESONIDE INHALATION 500 MCG: 0.5 SUSPENSION RESPIRATORY (INHALATION) at 20:48

## 2024-05-03 RX ADMIN — Medication 3 MG: at 20:18

## 2024-05-03 ASSESSMENT — PAIN SCALES - GENERAL
PAINLEVEL_OUTOF10: 0
PAINLEVEL_OUTOF10: 8

## 2024-05-03 ASSESSMENT — PAIN DESCRIPTION - LOCATION: LOCATION: BACK

## 2024-05-03 ASSESSMENT — PAIN DESCRIPTION - DESCRIPTORS: DESCRIPTORS: ACHING;DISCOMFORT;SORE

## 2024-05-03 NOTE — PROGRESS NOTES
Pulmonary Progress Note    Admit Date: 2024  Hospital day                               PCP: Juni Loyd MD    Chief Complaint (s):  Patient Active Problem List   Diagnosis    COPD (chronic obstructive pulmonary disease) (HCC)    Acute respiratory failure with hypoxia (HCC)    Thoracic ascending aortic aneurysm    S/P lumbar fusion    Hilar adenopathy    Pulmonary Mycobacterium avium complex (MAC) infection (HCC)    Chronic respiratory failure with hypoxia (HCC)    Chronic pain syndrome    Lumbar degenerative disc disease    Chronic low back pain    Immunocompromised (HCC)    Iron deficiency    Pulmonary hypertension (HCC)    Chronic diastolic heart failure (HCC)    Acute on chronic respiratory failure with hypoxia (HCC)    IgG deficiency (HCC)    COPD exacerbation (HCC)    Pneumonia of left lower lobe due to infectious organism    Bacterial pneumonia    Coronavirus infection       Subjective:  Patient seen on 7 L high flow nasal cannula. Patient admits to some improvement today. He feels he can take a deeper breath. Cultures still pending.       Vitals:  VITALS:  /74   Pulse 77   Temp 98.1 °F (36.7 °C) (Oral)   Resp 18   Ht 1.829 m (6')   Wt 81.3 kg (179 lb 3.7 oz)   SpO2 90%   BMI 24.31 kg/m²     24HR INTAKE/OUTPUT:      Intake/Output Summary (Last 24 hours) at 5/3/2024 1110  Last data filed at 5/3/2024 0257  Gross per 24 hour   Intake 240 ml   Output 750 ml   Net -510 ml         24HR PULSE OXIMETRY RANGE:    SpO2  Av.8 %  Min: 87 %  Max: 98 %      Medications:  IV:   sodium chloride         Scheduled Meds:   predniSONE  40 mg Oral Daily    doxycycline hyclate  100 mg Oral 2 times per day    ipratropium 0.5 mg-albuterol 2.5 mg  1 Dose Inhalation Q4H RT    cefepime  2,000 mg IntraVENous Q12H    sodium chloride flush  5-40 mL IntraVENous 2 times per day    enoxaparin  40 mg SubCUTAneous Daily    azithromycin  500 mg Oral Once per day on     budesonide  500 mcg  [address], [address] [facility], [address], [place] [address]  Office visits:  [address] [facility], [place] [id number]

## 2024-05-03 NOTE — PROGRESS NOTES
While rounding found the patient sitting up in bed and receptive to this . While engaged in the visit this  remained present through attentive listening, validating feelings, words of reassurance and encouragement. The patient had no complaints of discomfort or pain and is pleased with the care, but his desire is to get back home. Prayer was said and the patient expressed appreciation for the visit.  remains available for support.

## 2024-05-03 NOTE — PROGRESS NOTES
[facility]  [facility]  Progress Note    SUBJECTIVE:  Chief Complaint   Patient presents with    Shortness of Breath     HX of COPD. On O2     Patient is tolerating medications. No reported adverse drug reactions.  Says he is feeling better today   On 6LNC - says they are turning him up on exertion and I did notice some mild dyspnea with conversation.   No fevers     Review of systems:  As stated above in the chief complaint, otherwise negative.    Medications:  Scheduled Meds:   predniSONE  40 mg Oral Daily    doxycycline hyclate  100 mg Oral 2 times per day    ipratropium 0.5 mg-albuterol 2.5 mg  1 Dose Inhalation Q4H RT    cefepime  2,000 mg IntraVENous Q12H    sodium chloride flush  5-40 mL IntraVENous 2 times per day    enoxaparin  40 mg SubCUTAneous Daily    azithromycin  500 mg Oral Once per day on     budesonide  500 mcg Nebulization BID RT    bumetanide  1 mg Oral Once per day on     arformoterol tartrate  15 mcg Nebulization BID RT    midodrine  2.5 mg Oral TID     Continuous Infusions:   sodium chloride       PRN Meds:sodium chloride flush, sodium chloride, potassium chloride **OR** potassium alternative oral replacement **OR** potassium chloride, magnesium sulfate, ondansetron **OR** ondansetron, polyethylene glycol, acetaminophen **OR** acetaminophen, melatonin, ipratropium 0.5 mg-albuterol 2.5 mg, oxyCODONE-acetaminophen **AND** oxyCODONE    OBJECTIVE:  /88   Pulse 93   Temp 98.6 °F (37 °C) (Oral)   Resp 18   Ht 1.829 m (6')   Wt 81.3 kg (179 lb 3.7 oz)   SpO2 (!) 88%   BMI 24.31 kg/m²   Temp  Av °F (36.7 °C)  Min: 97.6 °F (36.4 °C)  Max: 98.6 °F (37 °C)  Constitutional: The patient is awake, alert, and oriented. In no distress. Sitting up in bed  Skin: Warm and dry. No rashes were noted.   HEENT: Round and reactive pupils.  Moist mucous membranes.  No ulcerations or thrush.  Neck: Supple to movements.   Chest: No use of accessory

## 2024-05-03 NOTE — PLAN OF CARE
Problem: Discharge Planning  Goal: Discharge to home or other facility with appropriate resources  5/3/2024 0945 by Aggie Michael RN  Outcome: Progressing  5/2/2024 2131 by Airam Ashton RN  Outcome: Progressing     Problem: Safety - Adult  Goal: Free from fall injury  5/3/2024 0945 by Aggie Michael RN  Outcome: Progressing  5/2/2024 2131 by Airam Ashton RN  Outcome: Progressing     Problem: Chronic Conditions and Co-morbidities  Goal: Patient's chronic conditions and co-morbidity symptoms are monitored and maintained or improved  5/3/2024 0945 by Aggie Michael RN  Outcome: Progressing  5/2/2024 2131 by Airam Ashton RN  Outcome: Progressing     Problem: Pain  Goal: Verbalizes/displays adequate comfort level or baseline comfort level  5/3/2024 0945 by Aggie Michael RN  Outcome: Progressing  5/2/2024 2131 by Airam Ashton RN  Outcome: Progressing     Problem: Respiratory - Adult  Goal: Achieves optimal ventilation and oxygenation  5/3/2024 0945 by Aggie Michael RN  Outcome: Progressing  5/2/2024 2131 by Airam Ashton RN  Outcome: Progressing

## 2024-05-03 NOTE — PROGRESS NOTES
Nutrition Assessment     Type and Reason for Visit: Initial, RD Nutrition Re-Screen/LOS    Nutrition Recommendations/Plan:   Continue diet per orders.  Will monitor while inpatient.     Nutrition Assessment:  ADM w/acute on chronic hypoxia, COPD exac, PNA. Hx: HF. Appetite has been good all 3 meals. No nut'l concerns at this time. Plans are home at discharge.    Nutrition Related Findings:   A& O x 4, -4.3  I&O, [place], flat abd, active BS, no edema, Alb 4.5, Bumex, Steroid Wound Type: None    Current Nutrition Therapies:    ADULT DIET; Regular    Anthropometric Measures:  Height: 182.9 cm (6')  Current Body Wt: 81.3 kg (179 lb 4 oz)   BMI: 24.3    Nutrition Diagnosis:   No nutrition diagnosis at this time      Nutrition Interventions:   Food and/or Nutrient Delivery: Continue Current Diet  Nutrition Education/Counseling: No recommendation at this time  Coordination of Nutrition Care: Continue to monitor while inpatient       Goals:     Goals: Meet at least 75% of estimated needs, by next RD assessment       Nutrition Monitoring and Evaluation:      Food/Nutrient Intake Outcomes: Food and Nutrient Intake  Physical Signs/Symptoms Outcomes: Biochemical Data, Fluid Status or Edema, Weight, Skin, Nutrition Focused Physical Findings    Discharge Planning:    Continue current diet     Cecilia Christine RD  Contact: [phone]

## 2024-05-03 NOTE — PLAN OF CARE
Problem: Discharge Planning  Goal: Discharge to home or other facility with appropriate resources  5/2/2024 2131 by Airam Ashton RN  Outcome: Progressing  5/2/2024 1112 by Nannette Zavaleta RN  Outcome: Progressing  Flowsheets (Taken 5/2/2024 0900)  Discharge to home or other facility with appropriate resources: Identify barriers to discharge with patient and caregiver     Problem: Safety - Adult  Goal: Free from fall injury  5/2/2024 2131 by Airam Ashton RN  Outcome: Progressing  5/2/2024 1112 by Nannette Zavaleta RN  Outcome: Progressing  Flowsheets (Taken 5/2/2024 0900)  Free From Fall Injury: Instruct family/caregiver on patient safety     Problem: Chronic Conditions and Co-morbidities  Goal: Patient's chronic conditions and co-morbidity symptoms are monitored and maintained or improved  5/2/2024 2131 by Airam Ashton RN  Outcome: Progressing  5/2/2024 1112 by Nannette Zavaleta RN  Outcome: Progressing  Flowsheets (Taken 5/2/2024 0900)  Care Plan - Patient's Chronic Conditions and Co-Morbidity Symptoms are Monitored and Maintained or Improved: Monitor and assess patient's chronic conditions and comorbid symptoms for stability, deterioration, or improvement     Problem: Pain  Goal: Verbalizes/displays adequate comfort level or baseline comfort level  5/2/2024 2131 by Airam Ashton RN  Outcome: Progressing  5/2/2024 1112 by Nannette Zavaleta RN  Outcome: Progressing  Flowsheets (Taken 5/2/2024 0859)  Verbalizes/displays adequate comfort level or baseline comfort level:   Encourage patient to monitor pain and request assistance   Assess pain using appropriate pain scale   Administer analgesics based on type and severity of pain and evaluate response   Implement non-pharmacological measures as appropriate and evaluate response   Consider cultural and social influences on pain and pain management   Notify Licensed Independent Practitioner if interventions unsuccessful or patient reports new pain     Problem: Respiratory -

## 2024-05-03 NOTE — PROGRESS NOTES
[facility] Hospitalist   Progress Note    Admitting Date and Time: 4/28/2024 11:50 AM  Admit Dx: COPD exacerbation (HCC) [J44.1]  Acute respiratory failure with hypoxia (HCC) [J96.01]  Pneumonia due to infectious organism, unspecified laterality, unspecified part of lung [J18.9]    Subjective:    Patient was admitted with COPD exacerbation (HCC) [J44.1]  Acute respiratory failure with hypoxia (HCC) [J96.01]  Pneumonia due to infectious organism, unspecified laterality, unspecified part of lung [J18.9]. Patient denies fever, chills, cp, n/v. Pt with some sob.      levoFLOXacin  750 mg Oral Daily    predniSONE  40 mg Oral Daily    ipratropium 0.5 mg-albuterol 2.5 mg  1 Dose Inhalation Q4H RT    cefepime  2,000 mg IntraVENous Q12H    sodium chloride flush  5-40 mL IntraVENous 2 times per day    enoxaparin  40 mg SubCUTAneous Daily    azithromycin  500 mg Oral Once per day on Mon Wed Fri    budesonide  500 mcg Nebulization BID RT    bumetanide  1 mg Oral Once per day on Mon Wed Fri    arformoterol tartrate  15 mcg Nebulization BID RT    midodrine  2.5 mg Oral TID     sodium chloride flush, 5-40 mL, PRN  sodium chloride, , PRN  potassium chloride, 40 mEq, PRN   Or  potassium alternative oral replacement, 40 mEq, PRN   Or  potassium chloride, 10 mEq, PRN  magnesium sulfate, 2,000 mg, PRN  ondansetron, 4 mg, Q8H PRN   Or  ondansetron, 4 mg, Q6H PRN  polyethylene glycol, 17 g, Daily PRN  acetaminophen, 650 mg, Q6H PRN   Or  acetaminophen, 650 mg, Q6H PRN  melatonin, 3 mg, Nightly PRN  ipratropium 0.5 mg-albuterol 2.5 mg, 1 Dose, Q4H PRN  oxyCODONE-acetaminophen, 1 tablet, BID PRN   And  oxyCODONE, 2.5 mg, BID PRN         Objective:    /88   Pulse 93   Temp 98.6 °F (37 °C) (Oral)   Resp 18   Ht 1.829 m (6')   Wt 81.3 kg (179 lb 3.7 oz)   SpO2 (!) 88%   BMI 24.31 kg/m²   Skin: warm and dry, no rash or erythema  Pulmonary/Chest: clear to auscultation bilaterally- no wheezes, rales or rhonchi,

## 2024-05-03 NOTE — CARE COORDINATION
Met with patient. Currently on 7L O2 HFNC. Pulmonolgy is following s/p bronchoscopy 5/1/24 cx's pending. ID is following IV bumex, IV cefepime, PO zitromax, PO prednisone, nebs. Home oxygen supplier is [employer], has life vest. Anticipated to discharge home with no needs. Will continue to follow.   Charito SUAREZN, RN  Case Management

## 2024-05-04 LAB
ANION GAP SERPL CALCULATED.3IONS-SCNC: 10 MMOL/L (ref 7–16)
BASOPHILS # BLD: 0.01 K/UL (ref 0–0.2)
BASOPHILS NFR BLD: 0 % (ref 0–2)
BUN SERPL-MCNC: 15 MG/DL (ref 6–23)
CALCIUM SERPL-MCNC: 9.9 MG/DL (ref 8.6–10.2)
CHLORIDE SERPL-SCNC: 101 MMOL/L (ref 98–107)
CO2 SERPL-SCNC: 24 MMOL/L (ref 22–29)
CREAT SERPL-MCNC: 0.7 MG/DL (ref 0.7–1.2)
EOSINOPHIL # BLD: 0.09 K/UL (ref 0.05–0.5)
EOSINOPHILS RELATIVE PERCENT: 1 % (ref 0–6)
ERYTHROCYTE [DISTWIDTH] IN BLOOD BY AUTOMATED COUNT: 13.6 % (ref 11.5–15)
GALACTOMANNAN AG BAL QL: NEGATIVE
GALACTOMANNAN AG SPEC IA-ACNC: 0.19
GFR, ESTIMATED: >90 ML/MIN/1.73M2
GLUCOSE SERPL-MCNC: 93 MG/DL (ref 74–99)
HCT VFR BLD AUTO: 49.1 % (ref 37–54)
HGB BLD-MCNC: 16.4 G/DL (ref 12.5–16.5)
IMM GRANULOCYTES # BLD AUTO: 0.05 K/UL (ref 0–0.58)
IMM GRANULOCYTES NFR BLD: 1 % (ref 0–5)
LYMPHOCYTES NFR BLD: 1.08 K/UL (ref 1.5–4)
LYMPHOCYTES RELATIVE PERCENT: 13 % (ref 20–42)
MCH RBC QN AUTO: 31.1 PG (ref 26–35)
MCHC RBC AUTO-ENTMCNC: 33.4 G/DL (ref 32–34.5)
MCV RBC AUTO: 93 FL (ref 80–99.9)
MICROORGANISM SPEC CULT: NORMAL
MICROORGANISM/AGENT SPEC: NORMAL
MONOCYTES NFR BLD: 0.6 K/UL (ref 0.1–0.95)
MONOCYTES NFR BLD: 7 % (ref 2–12)
NEUTROPHILS NFR BLD: 78 % (ref 43–80)
NEUTS SEG NFR BLD: 6.27 K/UL (ref 1.8–7.3)
PLATELET # BLD AUTO: 196 K/UL (ref 130–450)
PMV BLD AUTO: 10.1 FL (ref 7–12)
POTASSIUM SERPL-SCNC: 3.8 MMOL/L (ref 3.5–5)
RBC # BLD AUTO: 5.28 M/UL (ref 3.8–5.8)
SODIUM SERPL-SCNC: 135 MMOL/L (ref 132–146)
SPECIMEN DESCRIPTION: NORMAL
WBC OTHER # BLD: 8.1 K/UL (ref 4.5–11.5)

## 2024-05-04 PROCEDURE — 6360000002 HC RX W HCPCS: Performed by: STUDENT IN AN ORGANIZED HEALTH CARE EDUCATION/TRAINING PROGRAM

## 2024-05-04 PROCEDURE — 6370000000 HC RX 637 (ALT 250 FOR IP): Performed by: REGISTERED NURSE

## 2024-05-04 PROCEDURE — 94669 MECHANICAL CHEST WALL OSCILL: CPT

## 2024-05-04 PROCEDURE — 6360000002 HC RX W HCPCS: Performed by: SPECIALIST

## 2024-05-04 PROCEDURE — 94640 AIRWAY INHALATION TREATMENT: CPT

## 2024-05-04 PROCEDURE — 2580000003 HC RX 258: Performed by: SPECIALIST

## 2024-05-04 PROCEDURE — 2060000000 HC ICU INTERMEDIATE R&B

## 2024-05-04 PROCEDURE — 99232 SBSQ HOSP IP/OBS MODERATE 35: CPT | Performed by: INTERNAL MEDICINE

## 2024-05-04 PROCEDURE — 6370000000 HC RX 637 (ALT 250 FOR IP): Performed by: STUDENT IN AN ORGANIZED HEALTH CARE EDUCATION/TRAINING PROGRAM

## 2024-05-04 PROCEDURE — 6370000000 HC RX 637 (ALT 250 FOR IP)

## 2024-05-04 PROCEDURE — 85025 COMPLETE CBC W/AUTO DIFF WBC: CPT

## 2024-05-04 PROCEDURE — 2580000003 HC RX 258: Performed by: STUDENT IN AN ORGANIZED HEALTH CARE EDUCATION/TRAINING PROGRAM

## 2024-05-04 PROCEDURE — 2700000000 HC OXYGEN THERAPY PER DAY

## 2024-05-04 PROCEDURE — 80048 BASIC METABOLIC PNL TOTAL CA: CPT

## 2024-05-04 PROCEDURE — 6370000000 HC RX 637 (ALT 250 FOR IP): Performed by: INTERNAL MEDICINE

## 2024-05-04 RX ADMIN — MIDODRINE HYDROCHLORIDE 2.5 MG: 2.5 TABLET ORAL at 08:13

## 2024-05-04 RX ADMIN — IPRATROPIUM BROMIDE AND ALBUTEROL SULFATE 1 DOSE: 2.5; .5 SOLUTION RESPIRATORY (INHALATION) at 11:43

## 2024-05-04 RX ADMIN — BUDESONIDE INHALATION 500 MCG: 0.5 SUSPENSION RESPIRATORY (INHALATION) at 19:21

## 2024-05-04 RX ADMIN — ARFORMOTEROL TARTRATE 15 MCG: 15 SOLUTION RESPIRATORY (INHALATION) at 19:21

## 2024-05-04 RX ADMIN — PREDNISONE 40 MG: 20 TABLET ORAL at 08:13

## 2024-05-04 RX ADMIN — IPRATROPIUM BROMIDE AND ALBUTEROL SULFATE 1 DOSE: 2.5; .5 SOLUTION RESPIRATORY (INHALATION) at 23:15

## 2024-05-04 RX ADMIN — CEFEPIME 2000 MG: 2 INJECTION, POWDER, FOR SOLUTION INTRAVENOUS at 06:18

## 2024-05-04 RX ADMIN — SODIUM CHLORIDE, PRESERVATIVE FREE 10 ML: 5 INJECTION INTRAVENOUS at 08:14

## 2024-05-04 RX ADMIN — Medication 3 MG: at 19:46

## 2024-05-04 RX ADMIN — MIDODRINE HYDROCHLORIDE 2.5 MG: 2.5 TABLET ORAL at 19:43

## 2024-05-04 RX ADMIN — IPRATROPIUM BROMIDE AND ALBUTEROL SULFATE 1 DOSE: 2.5; .5 SOLUTION RESPIRATORY (INHALATION) at 07:56

## 2024-05-04 RX ADMIN — IPRATROPIUM BROMIDE AND ALBUTEROL SULFATE 1 DOSE: 2.5; .5 SOLUTION RESPIRATORY (INHALATION) at 19:21

## 2024-05-04 RX ADMIN — ENOXAPARIN SODIUM 40 MG: 100 INJECTION SUBCUTANEOUS at 08:13

## 2024-05-04 RX ADMIN — IPRATROPIUM BROMIDE AND ALBUTEROL SULFATE 1 DOSE: 2.5; .5 SOLUTION RESPIRATORY (INHALATION) at 15:48

## 2024-05-04 RX ADMIN — CEFEPIME 2000 MG: 2 INJECTION, POWDER, FOR SOLUTION INTRAVENOUS at 17:16

## 2024-05-04 RX ADMIN — ARFORMOTEROL TARTRATE 15 MCG: 15 SOLUTION RESPIRATORY (INHALATION) at 07:56

## 2024-05-04 RX ADMIN — LEVOFLOXACIN 750 MG: 500 TABLET, FILM COATED ORAL at 08:13

## 2024-05-04 RX ADMIN — Medication 3 MG: at 19:43

## 2024-05-04 RX ADMIN — BUDESONIDE INHALATION 500 MCG: 0.5 SUSPENSION RESPIRATORY (INHALATION) at 07:56

## 2024-05-04 RX ADMIN — ACETAMINOPHEN 650 MG: 325 TABLET ORAL at 11:13

## 2024-05-04 ASSESSMENT — PAIN SCALES - GENERAL
PAINLEVEL_OUTOF10: 0
PAINLEVEL_OUTOF10: 0
PAINLEVEL_OUTOF10: 7
PAINLEVEL_OUTOF10: 2

## 2024-05-04 ASSESSMENT — PAIN DESCRIPTION - ORIENTATION: ORIENTATION: RIGHT;LEFT

## 2024-05-04 ASSESSMENT — PAIN SCALES - WONG BAKER
WONGBAKER_NUMERICALRESPONSE: NO HURT
WONGBAKER_NUMERICALRESPONSE: NO HURT

## 2024-05-04 ASSESSMENT — PAIN DESCRIPTION - LOCATION: LOCATION: HIP

## 2024-05-04 ASSESSMENT — PAIN - FUNCTIONAL ASSESSMENT: PAIN_FUNCTIONAL_ASSESSMENT: ACTIVITIES ARE NOT PREVENTED

## 2024-05-04 ASSESSMENT — PAIN DESCRIPTION - DESCRIPTORS: DESCRIPTORS: ACHING;DISCOMFORT

## 2024-05-04 NOTE — PROGRESS NOTES
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 Cultures pending. He admits to being able to take a deeper breath. Productive cough clear or dark secretions.       Vitals:  VITALS:  /60   Pulse 75   Temp 97.7 °F (36.5 °C) (Oral)   Resp 18   Ht 1.829 m (6')   Wt 76.3 kg (168 lb 3.4 oz)   SpO2 91%   BMI 22.81 kg/m²     24HR INTAKE/OUTPUT:      Intake/Output Summary (Last 24 hours) at 2024 1042  Last data filed at 2024 0817  Gross per 24 hour   Intake 647.05 ml   Output 2050 ml   Net -1402.95 ml         24HR PULSE OXIMETRY RANGE:    SpO2  Av.6 %  Min: 87 %  Max: 97 %      Medications:  IV:   sodium chloride         Scheduled Meds:   levoFLOXacin  750 mg Oral Daily    predniSONE  40 mg Oral Daily    ipratropium 0.5 mg-albuterol 2.5 mg  1 Dose Inhalation Q4H RT    cefepime  2,000 mg IntraVENous Q12H    sodium chloride flush  5-40 mL IntraVENous 2 times per day    enoxaparin  40 mg SubCUTAneous Daily    azithromycin  500 mg Oral Once per day on     budesonide  500 mcg  opacities in the lung bases. Favor largely   atelectasis/scar. Some component of inflammatory disease possible.         CTA PULMONARY W CONTRAST   Final Result   1. There is no pulmonary embolus   2. Advanced centrilobular emphysematous changes   3. Left lower lobe pneumonia   4. Multiple areas of chronic pleuroparenchymal scarring seen within the right   and left lungs more prominent within the right lung base laterally.         XR CHEST PORTABLE   Final Result   1. Bibasilar airspace disease   2. Small right pleural effusion   3. Possible atelectasis within the right lung base   4. Emphysematous changes   5. CT of the chest is pending.             Assessment:  Left lower lobe infiltrate: Worsening on imaging. Possible HCAP.   End-stage COPD with exacerbation with underlying viral illness.   History of Pseudomonas infection  History of Mycobacterium AVM complex    Plan:  Continue prednisone 40 mg daily   Cultures pending from bronchoscopy  Antibiotics per infectious disease.    Continue aerosols  Supplemental oxygen      Time at the bedside, reviewing labs and radiographs, reviewing updated notes and consultations, discussing with staff and family was more than 50 minutes.    Please note that voice recognition technology was used in the preparation of this note and make therefore it may contain inadvertent transcription errors.  If the patient is a COVID 19 isolation patient, the above physical exam reflects that of the examining physician for the day.        MIGUE Lazo - NP  Attestation    The patient was seen and personally examined.  History is obtained through the patient by myself.  Impression and plan are mine.  No fungal elements seen on bronc washing.    Documentation only provided per the nurse practitioner.    Additions and corrections are reflected in the signed note.    Rolf Little MD,  M.D., F.C.C.P.  [facility]    [facility], [facility] [place] [facility]

## 2024-05-04 NOTE — PROGRESS NOTES
[facility] Hospitalist   Progress Note    Admitting Date and Time: 4/28/2024 11:50 AM  Admit Dx: COPD exacerbation (HCC) [J44.1]  Acute respiratory failure with hypoxia (HCC) [J96.01]  Pneumonia due to infectious organism, unspecified laterality, unspecified part of lung [J18.9]    Subjective:    Patient was admitted with COPD exacerbation (HCC) [J44.1]  Acute respiratory failure with hypoxia (HCC) [J96.01]  Pneumonia due to infectious organism, unspecified laterality, unspecified part of lung [J18.9]. Patient denies fever, chills, cp, sob, n/v.     levoFLOXacin  750 mg Oral Daily    predniSONE  40 mg Oral Daily    ipratropium 0.5 mg-albuterol 2.5 mg  1 Dose Inhalation Q4H RT    cefepime  2,000 mg IntraVENous Q12H    sodium chloride flush  5-40 mL IntraVENous 2 times per day    enoxaparin  40 mg SubCUTAneous Daily    azithromycin  500 mg Oral Once per day on Mon Wed Fri    budesonide  500 mcg Nebulization BID RT    bumetanide  1 mg Oral Once per day on Mon Wed Fri    arformoterol tartrate  15 mcg Nebulization BID RT    midodrine  2.5 mg Oral TID     sodium chloride flush, 5-40 mL, PRN  sodium chloride, , PRN  potassium chloride, 40 mEq, PRN   Or  potassium alternative oral replacement, 40 mEq, PRN   Or  potassium chloride, 10 mEq, PRN  magnesium sulfate, 2,000 mg, PRN  ondansetron, 4 mg, Q8H PRN   Or  ondansetron, 4 mg, Q6H PRN  polyethylene glycol, 17 g, Daily PRN  acetaminophen, 650 mg, Q6H PRN   Or  acetaminophen, 650 mg, Q6H PRN  melatonin, 3 mg, Nightly PRN  ipratropium 0.5 mg-albuterol 2.5 mg, 1 Dose, Q4H PRN  oxyCODONE-acetaminophen, 1 tablet, BID PRN   And  oxyCODONE, 2.5 mg, BID PRN         Objective:    /75   Pulse 91   Temp 97.9 °F (36.6 °C) (Oral)   Resp 18   Ht 1.829 m (6')   Wt 76.3 kg (168 lb 3.4 oz)   SpO2 (!) 87%   BMI 22.81 kg/m²   Skin: warm and dry, no rash or erythema  Pulmonary/Chest: clear to auscultation bilaterally- no wheezes, rales or rhonchi, normal air    Hyperinflation with central bronchial wall thickening and chronic coarsening   of the interstitium. Strandy opacities in the lung bases. Favor largely   atelectasis/scar. Some component of inflammatory disease possible.         CTA PULMONARY W CONTRAST   Final Result   1. There is no pulmonary embolus   2. Advanced centrilobular emphysematous changes   3. Left lower lobe pneumonia   4. Multiple areas of chronic pleuroparenchymal scarring seen within the right   and left lungs more prominent within the right lung base laterally.         XR CHEST PORTABLE   Final Result   1. Bibasilar airspace disease   2. Small right pleural effusion   3. Possible atelectasis within the right lung base   4. Emphysematous changes   5. CT of the chest is pending.             Assessment:    Principal Problem:    Acute on chronic respiratory failure with hypoxia (HCC)  Active Problems:    COPD (chronic obstructive pulmonary disease) (HCC)    Acute respiratory failure with hypoxia (HCC)    Thoracic ascending aortic aneurysm    S/P lumbar fusion    Pulmonary Mycobacterium avium complex (MAC) infection (HCC)    Chronic respiratory failure with hypoxia (HCC)    Immunocompromised (HCC)    Pulmonary hypertension (HCC)    Chronic diastolic heart failure (HCC)    IgG deficiency (HCC)    COPD exacerbation (HCC)    Bacterial pneumonia    Coronavirus infection  Resolved Problems:    * No resolved hospital problems. *      Plan:  Acute on chronic respiratory failure with hypoxia([id number]%)POA wean o2 as able  pulm following  Copd exac nebs and steroids  Coronavirus infection with possible pneumonia monitor  Probable pneumonia bacterial vs fungal  monitor on abx  Hyperglycemia likely due to stress reaction and steroids    Encourage IS. Await cultures from bronch. Encourage activity      Electronically signed by Mayur Ly DO on 5/4/2024 at 3:30 PM

## 2024-05-04 NOTE — PLAN OF CARE
Problem: Discharge Planning  Goal: Discharge to home or other facility with appropriate resources  5/3/2024 2154 by Airam Ashton RN  Outcome: Progressing  5/3/2024 0945 by Aggie Michael RN  Outcome: Progressing     Problem: Safety - Adult  Goal: Free from fall injury  5/3/2024 2154 by Airam Ashton RN  Outcome: Progressing  5/3/2024 0945 by Aggie Michael RN  Outcome: Progressing     Problem: Chronic Conditions and Co-morbidities  Goal: Patient's chronic conditions and co-morbidity symptoms are monitored and maintained or improved  5/3/2024 2154 by Airam Ashton RN  Outcome: Progressing  5/3/2024 0945 by Aggie Michael RN  Outcome: Progressing     Problem: Pain  Goal: Verbalizes/displays adequate comfort level or baseline comfort level  5/3/2024 2154 by Airam Ashton RN  Outcome: Progressing  5/3/2024 0945 by Aggie Michael RN  Outcome: Progressing     Problem: Respiratory - Adult  Goal: Achieves optimal ventilation and oxygenation  5/3/2024 2154 by Airam Ashton RN  Outcome: Progressing  5/3/2024 0945 by Aggie Michael RN  Outcome: Progressing

## 2024-05-04 NOTE — PROGRESS NOTES
[facility]  [facility]  Progress Note    SUBJECTIVE:  Chief Complaint   Patient presents with    Shortness of Breath     HX of COPD. On O2     Patient is tolerating medications. No reported adverse drug reactions.  No nausea vomiting or diarrhea    Review of systems:  As stated above in the chief complaint, otherwise negative.    Medications:  Scheduled Meds:   predniSONE  40 mg Oral Daily    doxycycline hyclate  100 mg Oral 2 times per day    ipratropium 0.5 mg-albuterol 2.5 mg  1 Dose Inhalation Q4H RT    cefepime  2,000 mg IntraVENous Q12H    sodium chloride flush  5-40 mL IntraVENous 2 times per day    enoxaparin  40 mg SubCUTAneous Daily    azithromycin  500 mg Oral Once per day on     budesonide  500 mcg Nebulization BID RT    bumetanide  1 mg Oral Once per day on     arformoterol tartrate  15 mcg Nebulization BID RT    midodrine  2.5 mg Oral TID     Continuous Infusions:   sodium chloride       PRN Meds:sodium chloride flush, sodium chloride, potassium chloride **OR** potassium alternative oral replacement **OR** potassium chloride, magnesium sulfate, ondansetron **OR** ondansetron, polyethylene glycol, acetaminophen **OR** acetaminophen, melatonin, ipratropium 0.5 mg-albuterol 2.5 mg, oxyCODONE-acetaminophen **AND** oxyCODONE    OBJECTIVE:  /88   Pulse 93   Temp 98.6 °F (37 °C) (Oral)   Resp 18   Ht 1.829 m (6')   Wt 81.3 kg (179 lb 3.7 oz)   SpO2 (!) 88%   BMI 24.31 kg/m²   Temp  Av °F (36.7 °C)  Min: 97.6 °F (36.4 °C)  Max: 98.6 °F (37 °C)  Constitutional: The patient is awake, alert, and oriented. In no distress. Sitting up in bed  Skin: Warm and dry. No rashes were noted.   HEENT: Round and reactive pupils.  Moist mucous membranes.  No ulcerations or thrush.  Neck: Supple to movements.   Chest: No use of accessory muscles to breathe. Symmetrical expansion.  R3 wheezing and harsh cough throughout.  5 L  Cardiovascular: S1 and S2 are  rhythmic and regular. No murmurs appreciated.   Abdomen: Positive bowel sounds to auscultation. Benign to palpation.   Extremities: No edema.  Lines: Peripheral.    Laboratory and Tests:  Lab Results   Component Value Date    CRP 5.0 04/29/2024    CRP 3.0 03/01/2024    CRP 31.0 (H) 01/20/2024     Lab Results   Component Value Date    SEDRATE 0 04/29/2024    SEDRATE 1 01/20/2024    SEDRATE 1 01/14/2024       Radiology:  Reviewed     Microbiology:   Streptococcus pneumoniae/Legionella urine Ag: negative  RVP: Coronavirus NL 63  Respiratory culture (bronchoscopy) 5/1: Pending ; NOF    No results for input(s): \"PROCAL\" in the last 72 hours.    ASSESSMENT:  Exacerbation COPD  Coronavirus NL 63 infection with possible viral pneumonia  Probable superimposed bacterial versus fungal left lower lobe pneumonia/HCAP  Mild leukocytosis  History of MAC infection, currently on suppressive Azithromycin  S/p bronchoscopy 5/1    PLAN:  Continue IV Cefepime 2g every 12 hours & oral LEVQ  Azithromycin suppression  Check final cultures from bronchoscopy   Monitor labs  Will continue to follow     MIGUE Bruce CNP  11:55 AM  5/3/2024

## 2024-05-05 LAB
ANION GAP SERPL CALCULATED.3IONS-SCNC: 10 MMOL/L (ref 7–16)
BASOPHILS # BLD: 0.01 K/UL (ref 0–0.2)
BASOPHILS NFR BLD: 0 % (ref 0–2)
BUN SERPL-MCNC: 14 MG/DL (ref 6–23)
CALCIUM SERPL-MCNC: 9.5 MG/DL (ref 8.6–10.2)
CHLORIDE SERPL-SCNC: 102 MMOL/L (ref 98–107)
CO2 SERPL-SCNC: 24 MMOL/L (ref 22–29)
CREAT SERPL-MCNC: 0.6 MG/DL (ref 0.7–1.2)
EOSINOPHIL # BLD: 0.12 K/UL (ref 0.05–0.5)
EOSINOPHILS RELATIVE PERCENT: 2 % (ref 0–6)
ERYTHROCYTE [DISTWIDTH] IN BLOOD BY AUTOMATED COUNT: 13.6 % (ref 11.5–15)
GFR, ESTIMATED: >90 ML/MIN/1.73M2
GLUCOSE SERPL-MCNC: 88 MG/DL (ref 74–99)
HCT VFR BLD AUTO: 49 % (ref 37–54)
HGB BLD-MCNC: 16.2 G/DL (ref 12.5–16.5)
IMM GRANULOCYTES # BLD AUTO: 0.08 K/UL (ref 0–0.58)
IMM GRANULOCYTES NFR BLD: 1 % (ref 0–5)
LYMPHOCYTES NFR BLD: 1.36 K/UL (ref 1.5–4)
LYMPHOCYTES RELATIVE PERCENT: 19 % (ref 20–42)
MCH RBC QN AUTO: 31 PG (ref 26–35)
MCHC RBC AUTO-ENTMCNC: 33.1 G/DL (ref 32–34.5)
MCV RBC AUTO: 93.9 FL (ref 80–99.9)
MONOCYTES NFR BLD: 0.64 K/UL (ref 0.1–0.95)
MONOCYTES NFR BLD: 9 % (ref 2–12)
NEUTROPHILS NFR BLD: 69 % (ref 43–80)
NEUTS SEG NFR BLD: 4.94 K/UL (ref 1.8–7.3)
PLATELET # BLD AUTO: 192 K/UL (ref 130–450)
PMV BLD AUTO: 10.1 FL (ref 7–12)
POTASSIUM SERPL-SCNC: 3.8 MMOL/L (ref 3.5–5)
RBC # BLD AUTO: 5.22 M/UL (ref 3.8–5.8)
SODIUM SERPL-SCNC: 136 MMOL/L (ref 132–146)
WBC OTHER # BLD: 7.2 K/UL (ref 4.5–11.5)

## 2024-05-05 PROCEDURE — 6360000002 HC RX W HCPCS: Performed by: SPECIALIST

## 2024-05-05 PROCEDURE — 6370000000 HC RX 637 (ALT 250 FOR IP): Performed by: STUDENT IN AN ORGANIZED HEALTH CARE EDUCATION/TRAINING PROGRAM

## 2024-05-05 PROCEDURE — 2060000000 HC ICU INTERMEDIATE R&B

## 2024-05-05 PROCEDURE — 2580000003 HC RX 258: Performed by: SPECIALIST

## 2024-05-05 PROCEDURE — 99232 SBSQ HOSP IP/OBS MODERATE 35: CPT | Performed by: INTERNAL MEDICINE

## 2024-05-05 PROCEDURE — 6370000000 HC RX 637 (ALT 250 FOR IP): Performed by: INTERNAL MEDICINE

## 2024-05-05 PROCEDURE — 6360000002 HC RX W HCPCS: Performed by: STUDENT IN AN ORGANIZED HEALTH CARE EDUCATION/TRAINING PROGRAM

## 2024-05-05 PROCEDURE — 2580000003 HC RX 258: Performed by: STUDENT IN AN ORGANIZED HEALTH CARE EDUCATION/TRAINING PROGRAM

## 2024-05-05 PROCEDURE — 6370000000 HC RX 637 (ALT 250 FOR IP)

## 2024-05-05 PROCEDURE — 2700000000 HC OXYGEN THERAPY PER DAY

## 2024-05-05 PROCEDURE — 94640 AIRWAY INHALATION TREATMENT: CPT

## 2024-05-05 PROCEDURE — 85025 COMPLETE CBC W/AUTO DIFF WBC: CPT

## 2024-05-05 PROCEDURE — 94669 MECHANICAL CHEST WALL OSCILL: CPT

## 2024-05-05 PROCEDURE — 80048 BASIC METABOLIC PNL TOTAL CA: CPT

## 2024-05-05 PROCEDURE — 6370000000 HC RX 637 (ALT 250 FOR IP): Performed by: REGISTERED NURSE

## 2024-05-05 RX ORDER — FLUTICASONE PROPIONATE 50 MCG
1 SPRAY, SUSPENSION (ML) NASAL DAILY
Status: DISCONTINUED | OUTPATIENT
Start: 2024-05-05 | End: 2024-05-06 | Stop reason: HOSPADM

## 2024-05-05 RX ADMIN — CEFEPIME 2000 MG: 2 INJECTION, POWDER, FOR SOLUTION INTRAVENOUS at 06:31

## 2024-05-05 RX ADMIN — MIDODRINE HYDROCHLORIDE 2.5 MG: 2.5 TABLET ORAL at 16:23

## 2024-05-05 RX ADMIN — FLUTICASONE PROPIONATE 1 SPRAY: 50 SPRAY, METERED NASAL at 12:05

## 2024-05-05 RX ADMIN — CEFEPIME 2000 MG: 2 INJECTION, POWDER, FOR SOLUTION INTRAVENOUS at 16:23

## 2024-05-05 RX ADMIN — IPRATROPIUM BROMIDE AND ALBUTEROL SULFATE 1 DOSE: 2.5; .5 SOLUTION RESPIRATORY (INHALATION) at 08:46

## 2024-05-05 RX ADMIN — BUDESONIDE INHALATION 500 MCG: 0.5 SUSPENSION RESPIRATORY (INHALATION) at 08:46

## 2024-05-05 RX ADMIN — IPRATROPIUM BROMIDE AND ALBUTEROL SULFATE 1 DOSE: 2.5; .5 SOLUTION RESPIRATORY (INHALATION) at 12:13

## 2024-05-05 RX ADMIN — SODIUM CHLORIDE, PRESERVATIVE FREE 10 ML: 5 INJECTION INTRAVENOUS at 08:42

## 2024-05-05 RX ADMIN — OXYCODONE AND ACETAMINOPHEN 1 TABLET: 5; 325 TABLET ORAL at 16:22

## 2024-05-05 RX ADMIN — IPRATROPIUM BROMIDE AND ALBUTEROL SULFATE 1 DOSE: 2.5; .5 SOLUTION RESPIRATORY (INHALATION) at 18:56

## 2024-05-05 RX ADMIN — MIDODRINE HYDROCHLORIDE 2.5 MG: 2.5 TABLET ORAL at 08:42

## 2024-05-05 RX ADMIN — ARFORMOTEROL TARTRATE 15 MCG: 15 SOLUTION RESPIRATORY (INHALATION) at 18:55

## 2024-05-05 RX ADMIN — ARFORMOTEROL TARTRATE 15 MCG: 15 SOLUTION RESPIRATORY (INHALATION) at 08:46

## 2024-05-05 RX ADMIN — MIDODRINE HYDROCHLORIDE 2.5 MG: 2.5 TABLET ORAL at 19:34

## 2024-05-05 RX ADMIN — LEVOFLOXACIN 750 MG: 500 TABLET, FILM COATED ORAL at 08:41

## 2024-05-05 RX ADMIN — SODIUM CHLORIDE, PRESERVATIVE FREE 10 ML: 5 INJECTION INTRAVENOUS at 19:34

## 2024-05-05 RX ADMIN — IPRATROPIUM BROMIDE AND ALBUTEROL SULFATE 1 DOSE: 2.5; .5 SOLUTION RESPIRATORY (INHALATION) at 23:31

## 2024-05-05 RX ADMIN — ENOXAPARIN SODIUM 40 MG: 100 INJECTION SUBCUTANEOUS at 08:42

## 2024-05-05 RX ADMIN — OXYCODONE 2.5 MG: 5 TABLET ORAL at 12:05

## 2024-05-05 RX ADMIN — PREDNISONE 40 MG: 20 TABLET ORAL at 08:42

## 2024-05-05 RX ADMIN — BUDESONIDE INHALATION 500 MCG: 0.5 SUSPENSION RESPIRATORY (INHALATION) at 18:55

## 2024-05-05 ASSESSMENT — PAIN SCALES - WONG BAKER: WONGBAKER_NUMERICALRESPONSE: NO HURT

## 2024-05-05 ASSESSMENT — PAIN SCALES - GENERAL
PAINLEVEL_OUTOF10: 8
PAINLEVEL_OUTOF10: 0
PAINLEVEL_OUTOF10: 0

## 2024-05-05 NOTE — PROGRESS NOTES
[facility] Hospitalist   Progress Note    Admitting Date and Time: 4/28/2024 11:50 AM  Admit Dx: COPD exacerbation (HCC) [J44.1]  Acute respiratory failure with hypoxia (HCC) [J96.01]  Pneumonia due to infectious organism, unspecified laterality, unspecified part of lung [J18.9]    Subjective:    Patient was admitted with COPD exacerbation (HCC) [J44.1]  Acute respiratory failure with hypoxia (HCC) [J96.01]  Pneumonia due to infectious organism, unspecified laterality, unspecified part of lung [J18.9]. Patient denies fever, chills, cp, sob, n/v.     [START ON 5/6/2024] predniSONE  30 mg Oral Daily with breakfast    fluticasone  1 spray Each Nostril Daily    levoFLOXacin  750 mg Oral Daily    ipratropium 0.5 mg-albuterol 2.5 mg  1 Dose Inhalation Q4H RT    cefepime  2,000 mg IntraVENous Q12H    sodium chloride flush  5-40 mL IntraVENous 2 times per day    enoxaparin  40 mg SubCUTAneous Daily    azithromycin  500 mg Oral Once per day on Mon Wed Fri    budesonide  500 mcg Nebulization BID RT    bumetanide  1 mg Oral Once per day on Mon Wed Fri    arformoterol tartrate  15 mcg Nebulization BID RT    midodrine  2.5 mg Oral TID     sodium chloride flush, 5-40 mL, PRN  sodium chloride, , PRN  potassium chloride, 40 mEq, PRN   Or  potassium alternative oral replacement, 40 mEq, PRN   Or  potassium chloride, 10 mEq, PRN  magnesium sulfate, 2,000 mg, PRN  ondansetron, 4 mg, Q8H PRN   Or  ondansetron, 4 mg, Q6H PRN  polyethylene glycol, 17 g, Daily PRN  acetaminophen, 650 mg, Q6H PRN   Or  acetaminophen, 650 mg, Q6H PRN  melatonin, 3 mg, Nightly PRN  ipratropium 0.5 mg-albuterol 2.5 mg, 1 Dose, Q4H PRN  oxyCODONE-acetaminophen, 1 tablet, BID PRN   And  oxyCODONE, 2.5 mg, BID PRN         Objective:    /74   Pulse 88   Temp 98.4 °F (36.9 °C) (Oral)   Resp 18   Ht 1.829 m (6')   Wt 76.3 kg (168 lb 3.4 oz)   SpO2 (!) 88%   BMI 22.81 kg/m²   Skin: warm and dry, no rash or erythema  Pulmonary/Chest:

## 2024-05-05 NOTE — PROGRESS NOTES
Pulmonary Progress Note    Admit Date: 2024  Hospital day                               PCP: Juni Loyd MD    Chief Complaint (s):  Patient Active Problem List   Diagnosis    COPD (chronic obstructive pulmonary disease) (HCC)    Acute respiratory failure with hypoxia (HCC)    Thoracic ascending aortic aneurysm    S/P lumbar fusion    Hilar adenopathy    Pulmonary Mycobacterium avium complex (MAC) infection (HCC)    Chronic respiratory failure with hypoxia (HCC)    Chronic pain syndrome    Lumbar degenerative disc disease    Chronic low back pain    Immunocompromised (HCC)    Iron deficiency    Pulmonary hypertension (HCC)    Chronic diastolic heart failure (HCC)    Acute on chronic respiratory failure with hypoxia (HCC)    IgG deficiency (HCC)    COPD exacerbation (HCC)    Pneumonia of left lower lobe due to infectious organism    Bacterial pneumonia    Coronavirus infection       Subjective:  Patient seen on 5 L nasal cannula. Admits secretions are more yellow, occasionally brown. He does have dyspnea on exertion, relieved with rest.     Vitals:  VITALS:  /64   Pulse 88   Temp 98.6 °F (37 °C) (Oral)   Resp 18   Ht 1.829 m (6')   Wt 76.3 kg (168 lb 3.4 oz)   SpO2 (!) 88%   BMI 22.81 kg/m²     24HR INTAKE/OUTPUT:      Intake/Output Summary (Last 24 hours) at 2024 1118  Last data filed at 2024 0756  Gross per 24 hour   Intake 360 ml   Output 500 ml   Net -140 ml         24HR PULSE OXIMETRY RANGE:    SpO2  Av.8 %  Min: 87 %  Max: 95 %      Medications:  IV:   sodium chloride         Scheduled Meds:   [START ON 2024] predniSONE  30 mg Oral Daily    levoFLOXacin  750 mg Oral Daily    ipratropium 0.5 mg-albuterol 2.5 mg  1 Dose Inhalation Q4H RT    cefepime  2,000 mg IntraVENous Q12H    sodium chloride flush  5-40 mL IntraVENous 2 times per day    enoxaparin  40 mg SubCUTAneous Daily    azithromycin  500 mg Oral Once per day on     budesonide  500 mcg

## 2024-05-05 NOTE — PROGRESS NOTES
[facility]  [facility]  Progress Note    SUBJECTIVE:  Chief Complaint   Patient presents with    Shortness of Breath     HX of COPD. On O2     Patient is tolerating medications. No reported adverse drug reactions.  No nausea vomiting or diarrhea    Review of systems:  As stated above in the chief complaint, otherwise negative.    Medications:  Scheduled Meds:   [START ON 2024] predniSONE  30 mg Oral Daily with breakfast    fluticasone  1 spray Each Nostril Daily    levoFLOXacin  750 mg Oral Daily    ipratropium 0.5 mg-albuterol 2.5 mg  1 Dose Inhalation Q4H RT    cefepime  2,000 mg IntraVENous Q12H    sodium chloride flush  5-40 mL IntraVENous 2 times per day    enoxaparin  40 mg SubCUTAneous Daily    azithromycin  500 mg Oral Once per day on     budesonide  500 mcg Nebulization BID RT    bumetanide  1 mg Oral Once per day on     arformoterol tartrate  15 mcg Nebulization BID RT    midodrine  2.5 mg Oral TID     Continuous Infusions:   sodium chloride       PRN Meds:sodium chloride flush, sodium chloride, potassium chloride **OR** potassium alternative oral replacement **OR** potassium chloride, magnesium sulfate, ondansetron **OR** ondansetron, polyethylene glycol, acetaminophen **OR** acetaminophen, melatonin, ipratropium 0.5 mg-albuterol 2.5 mg, oxyCODONE-acetaminophen **AND** oxyCODONE    OBJECTIVE:  /64   Pulse 88   Temp 98.6 °F (37 °C) (Oral)   Resp 18   Ht 1.829 m (6')   Wt 76.3 kg (168 lb 3.4 oz)   SpO2 (!) 88%   BMI 22.81 kg/m²   Temp  Av.1 °F (36.7 °C)  Min: 97.7 °F (36.5 °C)  Max: 98.7 °F (37.1 °C)  Constitutional: The patient is awake, alert, and oriented. In no distress. Sitting up in bed  Skin: Warm and dry. No rashes were noted.   HEENT: Round and reactive pupils.  Moist mucous membranes.  No ulcerations or thrush.  Neck: Supple to movements.   Chest: No use of accessory muscles to breathe. Symmetrical expansion.  no wheezing  few rales BL bases.  5 L  Cardiovascular: S1 and S2 are rhythmic and regular. No murmurs appreciated.   Abdomen: Positive bowel sounds to auscultation. Benign to palpation.   Extremities: No edema.  Lines: Peripheral.    Laboratory and Tests:  Lab Results   Component Value Date    CRP 5.0 04/29/2024    CRP 3.0 03/01/2024    CRP 31.0 (H) 01/20/2024     Lab Results   Component Value Date    SEDRATE 0 04/29/2024    SEDRATE 1 01/20/2024    SEDRATE 1 01/14/2024       Radiology:  Reviewed     Microbiology:   Streptococcus pneumoniae/Legionella urine Ag: negative  RVP: Coronavirus NL 63  Respiratory culture (bronchoscopy) 5/1: Pending ; NOF    No results for input(s): \"PROCAL\" in the last 72 hours.    ASSESSMENT:  Exacerbation COPD  Coronavirus NL 63 infection with possible viral pneumonia  Probable superimposed bacterial versus fungal left lower lobe pneumonia/HCAP  Mild leukocytosis  History of MAC infection, currently on suppressive Azithromycin  S/p bronchoscopy 5/1    PLAN:  oral LEVQ  Azithromycin suppression  Cefepime for now  Check final cultures from bronchoscopy   Monitor labs  Will continue to follow     MIGUE Hernandez - CNS  2:16 PM  5/5/2024

## 2024-05-06 VITALS
OXYGEN SATURATION: 90 % | SYSTOLIC BLOOD PRESSURE: 104 MMHG | BODY MASS INDEX: 22.78 KG/M2 | HEIGHT: 72 IN | TEMPERATURE: 97.6 F | RESPIRATION RATE: 18 BRPM | DIASTOLIC BLOOD PRESSURE: 76 MMHG | WEIGHT: 168.21 LBS | HEART RATE: 95 BPM

## 2024-05-06 LAB
EKG ATRIAL RATE: 83 BPM
EKG ATRIAL RATE: 87 BPM
EKG ATRIAL RATE: 92 BPM
EKG P AXIS: 35 DEGREES
EKG P AXIS: 65 DEGREES
EKG P AXIS: 66 DEGREES
EKG P-R INTERVAL: 154 MS
EKG P-R INTERVAL: 158 MS
EKG P-R INTERVAL: 166 MS
EKG Q-T INTERVAL: 340 MS
EKG Q-T INTERVAL: 368 MS
EKG Q-T INTERVAL: 388 MS
EKG QRS DURATION: 110 MS
EKG QRS DURATION: 86 MS
EKG QRS DURATION: 88 MS
EKG QTC CALCULATION (BAZETT): 420 MS
EKG QTC CALCULATION (BAZETT): 442 MS
EKG QTC CALCULATION (BAZETT): 455 MS
EKG R AXIS: 55 DEGREES
EKG R AXIS: 80 DEGREES
EKG R AXIS: 88 DEGREES
EKG T AXIS: -13 DEGREES
EKG T AXIS: -2 DEGREES
EKG T AXIS: -27 DEGREES
EKG VENTRICULAR RATE: 83 BPM
EKG VENTRICULAR RATE: 87 BPM
EKG VENTRICULAR RATE: 92 BPM

## 2024-05-06 PROCEDURE — 6370000000 HC RX 637 (ALT 250 FOR IP): Performed by: REGISTERED NURSE

## 2024-05-06 PROCEDURE — 93005 ELECTROCARDIOGRAM TRACING: CPT | Performed by: REGISTERED NURSE

## 2024-05-06 PROCEDURE — 99239 HOSP IP/OBS DSCHRG MGMT >30: CPT | Performed by: INTERNAL MEDICINE

## 2024-05-06 PROCEDURE — 94640 AIRWAY INHALATION TREATMENT: CPT

## 2024-05-06 PROCEDURE — 2700000000 HC OXYGEN THERAPY PER DAY

## 2024-05-06 PROCEDURE — 2580000003 HC RX 258: Performed by: STUDENT IN AN ORGANIZED HEALTH CARE EDUCATION/TRAINING PROGRAM

## 2024-05-06 PROCEDURE — 93010 ELECTROCARDIOGRAM REPORT: CPT | Performed by: INTERNAL MEDICINE

## 2024-05-06 PROCEDURE — 6360000002 HC RX W HCPCS: Performed by: STUDENT IN AN ORGANIZED HEALTH CARE EDUCATION/TRAINING PROGRAM

## 2024-05-06 PROCEDURE — 6360000002 HC RX W HCPCS: Performed by: SPECIALIST

## 2024-05-06 PROCEDURE — 2580000003 HC RX 258: Performed by: SPECIALIST

## 2024-05-06 PROCEDURE — 6370000000 HC RX 637 (ALT 250 FOR IP): Performed by: INTERNAL MEDICINE

## 2024-05-06 PROCEDURE — 94669 MECHANICAL CHEST WALL OSCILL: CPT

## 2024-05-06 PROCEDURE — 6370000000 HC RX 637 (ALT 250 FOR IP): Performed by: STUDENT IN AN ORGANIZED HEALTH CARE EDUCATION/TRAINING PROGRAM

## 2024-05-06 PROCEDURE — 6370000000 HC RX 637 (ALT 250 FOR IP)

## 2024-05-06 RX ORDER — PREDNISONE 10 MG/1
TABLET ORAL
Qty: 10 TABLET | Refills: 0 | Status: SHIPPED | OUTPATIENT
Start: 2024-05-06

## 2024-05-06 RX ORDER — FLUTICASONE PROPIONATE 50 MCG
1 SPRAY, SUSPENSION (ML) NASAL DAILY
Qty: 16 G | Refills: 3 | Status: SHIPPED | OUTPATIENT
Start: 2024-05-07

## 2024-05-06 RX ADMIN — IPRATROPIUM BROMIDE AND ALBUTEROL SULFATE 1 DOSE: 2.5; .5 SOLUTION RESPIRATORY (INHALATION) at 08:46

## 2024-05-06 RX ADMIN — BUDESONIDE INHALATION 500 MCG: 0.5 SUSPENSION RESPIRATORY (INHALATION) at 08:46

## 2024-05-06 RX ADMIN — IPRATROPIUM BROMIDE AND ALBUTEROL SULFATE 1 DOSE: 2.5; .5 SOLUTION RESPIRATORY (INHALATION) at 15:54

## 2024-05-06 RX ADMIN — CEFEPIME 2000 MG: 2 INJECTION, POWDER, FOR SOLUTION INTRAVENOUS at 05:14

## 2024-05-06 RX ADMIN — MIDODRINE HYDROCHLORIDE 2.5 MG: 2.5 TABLET ORAL at 13:44

## 2024-05-06 RX ADMIN — PREDNISONE 30 MG: 20 TABLET ORAL at 09:10

## 2024-05-06 RX ADMIN — ARFORMOTEROL TARTRATE 15 MCG: 15 SOLUTION RESPIRATORY (INHALATION) at 08:46

## 2024-05-06 RX ADMIN — OXYCODONE AND ACETAMINOPHEN 1 TABLET: 5; 325 TABLET ORAL at 14:36

## 2024-05-06 RX ADMIN — LEVOFLOXACIN 750 MG: 500 TABLET, FILM COATED ORAL at 09:10

## 2024-05-06 RX ADMIN — MIDODRINE HYDROCHLORIDE 2.5 MG: 2.5 TABLET ORAL at 09:10

## 2024-05-06 RX ADMIN — ENOXAPARIN SODIUM 40 MG: 100 INJECTION SUBCUTANEOUS at 09:13

## 2024-05-06 RX ADMIN — FLUTICASONE PROPIONATE 1 SPRAY: 50 SPRAY, METERED NASAL at 09:10

## 2024-05-06 RX ADMIN — AZITHROMYCIN 500 MG: 250 TABLET, FILM COATED ORAL at 09:10

## 2024-05-06 RX ADMIN — SODIUM CHLORIDE, PRESERVATIVE FREE 10 ML: 5 INJECTION INTRAVENOUS at 09:13

## 2024-05-06 RX ADMIN — OXYCODONE 2.5 MG: 5 TABLET ORAL at 14:36

## 2024-05-06 RX ADMIN — IPRATROPIUM BROMIDE AND ALBUTEROL SULFATE 1 DOSE: 2.5; .5 SOLUTION RESPIRATORY (INHALATION) at 12:09

## 2024-05-06 ASSESSMENT — PAIN DESCRIPTION - DESCRIPTORS: DESCRIPTORS: NUMBNESS

## 2024-05-06 ASSESSMENT — PAIN SCALES - GENERAL: PAINLEVEL_OUTOF10: 8

## 2024-05-06 ASSESSMENT — PAIN DESCRIPTION - LOCATION: LOCATION: HIP

## 2024-05-06 ASSESSMENT — PAIN DESCRIPTION - ORIENTATION: ORIENTATION: LEFT

## 2024-05-06 NOTE — PROGRESS NOTES
Per update from Dr. Little, okay for discharge when okay with ID. ID also okay with discharge.    Electronically signed by Estpehanie Watson RN on 5/6/2024 at 1:46 PM

## 2024-05-06 NOTE — DISCHARGE SUMMARY
[facility] Hospitalist       Hospitalist Physician Discharge Summary       Juni Loyd MD  [address]  [address]  [place] [id number]  [phone]    Call      Rolf Little MD  [address]  [address]  [place] [address]  [phone]    Call        Activity level: as jo    Diet: ADULT DIET; Regular    Dispo:home    Condition at discharge: fair        Patient ID:  Jason Cox  [id number]  73 y.o.  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 *        Acute on chronic respiratory failure with hypoxia  Copd exac  Coronavirus infection with possible

## 2024-05-06 NOTE — PLAN OF CARE
Problem: Discharge Planning  Goal: Discharge to home or other facility with appropriate resources  5/6/2024 1003 by Ariana Doran RN  Outcome: Progressing  5/5/2024 2159 by Talia Davis RN  Outcome: Progressing  5/5/2024 2159 by Talia Davis RN  Outcome: Progressing  5/5/2024 2152 by Talia Davis RN  Outcome: Progressing     Problem: Safety - Adult  Goal: Free from fall injury  5/6/2024 1003 by Ariana Doran RN  Outcome: Progressing  5/5/2024 2159 by Talia Davis RN  Outcome: Progressing  5/5/2024 2159 by Talia Davis RN  Outcome: Progressing  5/5/2024 2152 by Talia Davis RN  Outcome: Progressing     Problem: Chronic Conditions and Co-morbidities  Goal: Patient's chronic conditions and co-morbidity symptoms are monitored and maintained or improved  5/6/2024 1003 by Ariana Doran RN  Outcome: Progressing  5/5/2024 2159 by Talia Davis RN  Outcome: Progressing  5/5/2024 2159 by Talia Davis RN  Outcome: Progressing  5/5/2024 2152 by Talia Davis RN  Outcome: Progressing     Problem: Pain  Goal: Verbalizes/displays adequate comfort level or baseline comfort level  5/6/2024 1003 by Ariana Doran RN  Outcome: Progressing  5/5/2024 2159 by Talia Davis RN  Outcome: Progressing  5/5/2024 2159 by Talia Davis RN  Outcome: Progressing  5/5/2024 2152 by Talia Davis RN  Outcome: Progressing     Problem: Respiratory - Adult  Goal: Achieves optimal ventilation and oxygenation  5/6/2024 1003 by Ariana Doran RN  Outcome: Progressing  5/5/2024 2159 by Talia Davis RN  Outcome: Progressing  5/5/2024 2159 by Talia Davis RN  Outcome: Progressing  5/5/2024 2152 by Talia Davis RN  Outcome: Progressing     Problem: Infection - Adult  Goal: Absence of infection at discharge  Outcome: Progressing     Problem: Infection - Adult  Goal: Absence of infection during hospitalization  Outcome: Progressing     Problem: Infection - Adult  Goal: Absence of fever/infection during

## 2024-05-06 NOTE — PROGRESS NOTES
[facility]  [facility]  Progress Note    SUBJECTIVE:  Chief Complaint   Patient presents with    Shortness of Breath     HX of COPD. On O2     Patient is tolerating medications. No reported adverse drug reactions.  No nausea vomiting or diarrhea  On 5LNC  Asking about going home  Says he is feeling much better    Review of systems:  As stated above in the chief complaint, otherwise negative.    Medications:  Scheduled Meds:   predniSONE  30 mg Oral Daily with breakfast    fluticasone  1 spray Each Nostril Daily    levoFLOXacin  750 mg Oral Daily    ipratropium 0.5 mg-albuterol 2.5 mg  1 Dose Inhalation Q4H RT    sodium chloride flush  5-40 mL IntraVENous 2 times per day    enoxaparin  40 mg SubCUTAneous Daily    azithromycin  500 mg Oral Once per day on     budesonide  500 mcg Nebulization BID RT    bumetanide  1 mg Oral Once per day on     arformoterol tartrate  15 mcg Nebulization BID RT    midodrine  2.5 mg Oral TID     Continuous Infusions:   sodium chloride       PRN Meds:sodium chloride flush, sodium chloride, potassium chloride **OR** potassium alternative oral replacement **OR** potassium chloride, magnesium sulfate, ondansetron **OR** ondansetron, polyethylene glycol, acetaminophen **OR** acetaminophen, melatonin, ipratropium 0.5 mg-albuterol 2.5 mg, oxyCODONE-acetaminophen **AND** oxyCODONE    OBJECTIVE:  /74   Pulse 80   Temp 97.9 °F (36.6 °C) (Oral)   Resp 18   Ht 1.829 m (6')   Wt 76.3 kg (168 lb 3.4 oz)   SpO2 90%   BMI 22.81 kg/m²   Temp  Av.2 °F (36.8 °C)  Min: 97.9 °F (36.6 °C)  Max: 98.6 °F (37 °C)  Constitutional: The patient is awake, alert, and oriented. In no distress. Sitting up in bed  Skin: Warm and dry. No rashes were noted.   HEENT: Round and reactive pupils.  Moist mucous membranes.  No ulcerations or thrush.  Neck: Supple to movements.   Chest: No use of accessory muscles to breathe. Symmetrical expansion. No

## 2024-05-06 NOTE — CARE COORDINATION
Social work / Discharge planning:          Discharge plan is home when medically stable.   Patient has home oxygen and baseline is 5 liters provided by [facility].     Currently at his baseline.    Electronically signed by MALVIN Castrejon on 5/6/2024 at 10:32 AM

## 2024-05-06 NOTE — PROGRESS NOTES
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  States he is doing okay.    Vitals:  VITALS:  /76   Pulse 95   Temp 97.6 °F (36.4 °C) (Oral)   Resp 18   Ht 1.829 m (6')   Wt 76.3 kg (168 lb 3.4 oz)   SpO2 90%   BMI 22.81 kg/m²     24HR INTAKE/OUTPUT:      Intake/Output Summary (Last 24 hours) at 2024 1518  Last data filed at 2024 1200  Gross per 24 hour   Intake 360 ml   Output 1500 ml   Net -1140 ml         24HR PULSE OXIMETRY RANGE:    SpO2  Av %  Min: 88 %  Max: 99 %      Medications:  IV:   sodium chloride         Scheduled Meds:   predniSONE  30 mg Oral Daily with breakfast    fluticasone  1 spray Each Nostril Daily    levoFLOXacin  750 mg Oral Daily    ipratropium 0.5 mg-albuterol 2.5 mg  1 Dose Inhalation Q4H RT    sodium chloride flush  5-40 mL IntraVENous 2 times per day    enoxaparin  40 mg SubCUTAneous Daily    azithromycin  500 mg Oral Once per day on     budesonide  500 mcg Nebulization BID RT    bumetanide  1 mg Oral Once per day on     arformoterol tartrate

## 2024-05-07 ENCOUNTER — CARE COORDINATION (OUTPATIENT)
Dept: CARE COORDINATION | Age: 74
End: 2024-05-07

## 2024-05-07 NOTE — CARE COORDINATION
Care Transitions Note    Initial Call - Call within 2 business days of discharge: Yes     Outreach Attempts:   1st attempt to reach the patient for initial Care Transition call post hospital discharge. Call blocker on phone present when calling pt and had CTN state name and push #. Phone rang several times with no answer and then phone disconnected. No VM capabilities present.    Will attempt outreach again.    CTN will route message to pcp's office staff requesting f/u with pt to offer a 7d HFU appt.    Patient: Jason Cox    Patient : 1950   MRN: [id number]    Reason for Admission: Acute respiratory failure with hypoxia   Discharge Date: 24  RURS: Readmission Risk              Risk of Unplanned Readmission:  22          Last Discharge Facility       Date Complaint Diagnosis Description Type Department Provider    24 Shortness of Breath Acute respiratory failure with hypoxia (HCC) ... ED to Hosp-Admission (Discharged) (ADMITTED) [facility] 6W Mayur Ly DO; Lyle Spain...            Was this an external facility discharge? No    Follow Up Appointment:   Patient does not have a follow up appointment scheduled at time of call. CTN will route message to pcp's office staff requesting f/u with pt to offer a 7d HFU appt.  Future Appointments         Provider Specialty Dept Phone    2024 10:00 AM Chirag Dawson DO Otolaryngology [phone]    2024 11:15 AM Juni Loyd MD Internal Medicine [phone]    2024 1:30 PM Dea Cason, APRN - NP Pulmonology [phone]    2024 9:30 AM Korey Duque MD Infectious Diseases [phone]            Plan for follow-up on next business day.      Georgia Vaughan RN

## 2024-05-08 ENCOUNTER — CARE COORDINATION (OUTPATIENT)
Dept: CARE COORDINATION | Age: 74
End: 2024-05-08

## 2024-05-08 NOTE — CARE COORDINATION
Care Transitions Note    Initial Call - Call within 2 business days of discharge: Yes     Patient Current Location:  Home: [address] [place] [address]    Care Transition Nurse contacted the patient by telephone to perform post hospital discharge assessment. Provided introduction to self, and explanation of the Care Transition Nurse role.     Patient: Jason Cox    Patient : 1950   MRN: [id number]    Reason for Admission: Acute respiratory failure with hypoxia   Discharge Date: 24  RURS: Readmission Risk Score: 22.4      Last Discharge Facility       Date Complaint Diagnosis Description Type Department Provider    24 Shortness of Breath Acute respiratory failure with hypoxia (HCC) ... ED to Hosp-Admission (Discharged) (ADMITTED) [facility]W Mayur Ly DO; Lyle Spain...            Was this an external facility discharge? No    Additional needs identified to be addressed with provider   No needs identified             Method of communication with provider: none.    Patients top risk factors for readmission: level of motivation, medical condition-COPD, pulm htn, CHF, chronic pain, Fe def anemia, and multiple health system providers    Interventions to address risk factors:   DME: O2 @ 5L cont  Scheduled HFU appt with pcp  Contact pulm office to discuss moving appt up sooner then already scheduled  Consider a palliative care referral~discuss on upcoming calls    Care Summary Note: CTN called and spoke with the pt for an initial care transition call post hospital discharge. Pt admitted on 24 dx ac resp failure with hypoxia and ?viral pna. S/p bronch on . +coronavirus NL 63 per ID notes.     Pt was brief in conversation stating, \"Let's just get this over with.\" CTN offered to call back later or stated to pt that he could call this CTN back later when more convenient, however, pt declined both stating again, \"Let's just do it now.\"     Pt states that he is doing \"alright\" and

## 2024-05-10 LAB
MICROORGANISM SPEC CULT: NORMAL
MICROORGANISM SPEC CULT: NORMAL
MICROORGANISM/AGENT SPEC: NORMAL
MICROORGANISM/AGENT SPEC: NORMAL
SPECIMEN DESCRIPTION: NORMAL
SPECIMEN DESCRIPTION: NORMAL

## 2024-05-11 LAB
MICROORGANISM SPEC CULT: NORMAL
MICROORGANISM/AGENT SPEC: NORMAL
SPECIMEN DESCRIPTION: NORMAL

## 2024-05-15 ENCOUNTER — CARE COORDINATION (OUTPATIENT)
Dept: CARE COORDINATION | Age: 74
End: 2024-05-15

## 2024-05-15 NOTE — CARE COORDINATION
Care Transitions Note    Follow Up Call          Outreach Attempts:   1st attempt to reach the patient for subsequent Care Transition call. HIPAA compliant message left with CTN's contact information requesting return phone call.     Will attempt outreach again.      Follow Up Appointment:   Future Appointments         Provider Specialty Dept Phone    5/17/2024 10:00 AM Chirag Dawson DO Otolaryngology [phone]    6/7/2024 11:15 AM Juni Loyd MD Internal Medicine [phone]    7/11/2024 1:30 PM Dea Cason, APRN - NP Pulmonology [phone]    8/27/2024 9:30 AM Korey Duque MD Infectious Diseases [phone]          Georgia Vaughan RN

## 2024-05-18 LAB
MICROORGANISM SPEC CULT: NORMAL
MICROORGANISM/AGENT SPEC: NORMAL
SPECIMEN DESCRIPTION: NORMAL

## 2024-05-22 ENCOUNTER — CARE COORDINATION (OUTPATIENT)
Dept: CARE COORDINATION | Age: 74
End: 2024-05-22

## 2024-05-22 NOTE — CARE COORDINATION
Care Transitions Note    Follow Up Call      Outreach Attempts:   2nd attempt to reach the patient for subsequent Care Transition call. Phone rang repeatedly with no answer. No VM was available to leave a message.     No further outreaches will be attempted.    CTN signing off and resolving CT episode    Follow Up Appointment:   Future Appointments         Provider Specialty Dept Phone    6/7/2024 11:15 AM Juni Loyd MD Internal Medicine [phone]    6/8/2024 2:15 PM (Arrive by 1:30 PM) [facility] [phone]    7/11/2024 1:30 PM Dea Cason, APRN - NP Pulmonology [phone]    8/13/2024 1:15 PM Chirag Dawosn,  Otolaryngology [phone]    8/27/2024 9:30 AM Korey Duque MD Infectious Diseases [phone]            Georgia Vaughan RN

## 2024-05-27 NOTE — TELEPHONE ENCOUNTER
Okay I will send a prescription for a Z-Benja to [facility]. He can let us know if his symptoms do not improve.
Pt notified
Pt requesting zpak to [facility] for sinus infx yellow pnd and congestion it always helps
Yes

## 2024-05-31 LAB
MICROORGANISM SPEC CULT: NORMAL
MICROORGANISM/AGENT SPEC: NORMAL
SPECIMEN DESCRIPTION: NORMAL

## 2024-06-08 ENCOUNTER — HOSPITAL ENCOUNTER (OUTPATIENT)
Dept: MRI IMAGING | Age: 74
End: 2024-06-08
Attending: INTERNAL MEDICINE
Payer: MEDICARE

## 2024-06-08 DIAGNOSIS — M54.16 LEFT LUMBAR RADICULOPATHY: ICD-10-CM

## 2024-06-08 PROCEDURE — 72158 MRI LUMBAR SPINE W/O & W/DYE: CPT

## 2024-06-08 PROCEDURE — 6360000004 HC RX CONTRAST MEDICATION: Performed by: RADIOLOGY

## 2024-06-08 PROCEDURE — A9579 GAD-BASE MR CONTRAST NOS,1ML: HCPCS | Performed by: RADIOLOGY

## 2024-06-08 RX ADMIN — GADOTERIDOL 16 ML: 279.3 INJECTION, SOLUTION INTRAVENOUS at 14:52

## 2024-07-22 ENCOUNTER — INITIAL CONSULT (OUTPATIENT)
Dept: NEUROSURGERY | Age: 74
End: 2024-07-22
Payer: MEDICARE

## 2024-07-22 VITALS — SYSTOLIC BLOOD PRESSURE: 141 MMHG | OXYGEN SATURATION: 74 % | HEART RATE: 96 BPM | DIASTOLIC BLOOD PRESSURE: 79 MMHG

## 2024-07-22 DIAGNOSIS — G89.29 CHRONIC BILATERAL LOW BACK PAIN WITH BILATERAL SCIATICA: Primary | ICD-10-CM

## 2024-07-22 DIAGNOSIS — M54.41 CHRONIC BILATERAL LOW BACK PAIN WITH BILATERAL SCIATICA: Primary | ICD-10-CM

## 2024-07-22 DIAGNOSIS — M54.42 CHRONIC BILATERAL LOW BACK PAIN WITH BILATERAL SCIATICA: Primary | ICD-10-CM

## 2024-07-22 DIAGNOSIS — M54.16 LUMBAR RADICULOPATHY: ICD-10-CM

## 2024-07-22 PROCEDURE — 3017F COLORECTAL CA SCREEN DOC REV: CPT | Performed by: STUDENT IN AN ORGANIZED HEALTH CARE EDUCATION/TRAINING PROGRAM

## 2024-07-22 PROCEDURE — 1036F TOBACCO NON-USER: CPT | Performed by: STUDENT IN AN ORGANIZED HEALTH CARE EDUCATION/TRAINING PROGRAM

## 2024-07-22 PROCEDURE — G8420 CALC BMI NORM PARAMETERS: HCPCS | Performed by: STUDENT IN AN ORGANIZED HEALTH CARE EDUCATION/TRAINING PROGRAM

## 2024-07-22 PROCEDURE — 99203 OFFICE O/P NEW LOW 30 MIN: CPT | Performed by: STUDENT IN AN ORGANIZED HEALTH CARE EDUCATION/TRAINING PROGRAM

## 2024-07-22 PROCEDURE — 1123F ACP DISCUSS/DSCN MKR DOCD: CPT | Performed by: STUDENT IN AN ORGANIZED HEALTH CARE EDUCATION/TRAINING PROGRAM

## 2024-07-22 PROCEDURE — G8427 DOCREV CUR MEDS BY ELIG CLIN: HCPCS | Performed by: STUDENT IN AN ORGANIZED HEALTH CARE EDUCATION/TRAINING PROGRAM

## 2024-07-22 ASSESSMENT — ENCOUNTER SYMPTOMS
PHOTOPHOBIA: 0
TROUBLE SWALLOWING: 0
SHORTNESS OF BREATH: 1
BACK PAIN: 1
ABDOMINAL PAIN: 0

## 2024-07-22 NOTE — PROGRESS NOTES
[facility]      Subjective:  Jason Cox is a 72 y/o male who has a PMH COPD, AAA and pulmonary HTN currently on oxygen who presents for evaluation of low back pain. Patient states he has had this pain for years, but it has progressively gotten worse. He describes this pain as a sharp, stabbing pain that radiates into his left hip and leg. He admits to associated numbness and weakness with this pain, and requires a wheelchair. Patient is also having trouble breathing which is making getting around hard. He has tried percocet with some relief. He has tried PT and LEEANNA years ago with no relief, no recent LEEANNA. He currently follows with [facility] management. He denies any bowel or bladder incontinence. He is a nonsmoker and denies any blood thinner usage.       Review of Systems   Constitutional:  Negative for chills and fever.   HENT:  Negative for trouble swallowing.    Eyes:  Negative for photophobia.   Respiratory:  Positive for shortness of breath.    Cardiovascular:  Negative for chest pain.   Gastrointestinal:  Negative for abdominal pain.   Endocrine: Negative for heat intolerance.   Genitourinary:  Negative for difficulty urinating and flank pain.   Musculoskeletal:  Positive for arthralgias and back pain. Negative for myalgias.   Skin:  Negative for wound.   Neurological:  Positive for weakness and numbness. Negative for headaches.   Psychiatric/Behavioral:  Negative for confusion.      Objective:  Vitals:    07/22/24 1416   BP: (!) 141/79   Pulse: 96   SpO2: (!) 74%     Physical Exam  HENT:      Head: Normocephalic.   Eyes:      Pupils: Pupils are equal, round, and reactive to light.   Cardiovascular:      Rate and Rhythm: Normal rate.   Pulmonary:      Effort: Pulmonary effort is normal.      Comments: Currently on oxygen  Abdominal:      General: There is no distension.   Musculoskeletal:         General: Normal range of motion.   Skin:     General: Skin

## 2024-08-13 ENCOUNTER — TELEPHONE (OUTPATIENT)
Dept: ENT CLINIC | Age: 74
End: 2024-08-13

## 2024-08-13 ENCOUNTER — OFFICE VISIT (OUTPATIENT)
Dept: ENT CLINIC | Age: 74
End: 2024-08-13
Payer: MEDICARE

## 2024-08-13 DIAGNOSIS — H61.23 BILATERAL IMPACTED CERUMEN: Primary | ICD-10-CM

## 2024-08-13 PROCEDURE — 99212 OFFICE O/P EST SF 10 MIN: CPT | Performed by: OTOLARYNGOLOGY

## 2024-08-13 PROCEDURE — G8420 CALC BMI NORM PARAMETERS: HCPCS | Performed by: OTOLARYNGOLOGY

## 2024-08-13 PROCEDURE — 1123F ACP DISCUSS/DSCN MKR DOCD: CPT | Performed by: OTOLARYNGOLOGY

## 2024-08-13 PROCEDURE — 3017F COLORECTAL CA SCREEN DOC REV: CPT | Performed by: OTOLARYNGOLOGY

## 2024-08-13 PROCEDURE — 1036F TOBACCO NON-USER: CPT | Performed by: OTOLARYNGOLOGY

## 2024-08-13 PROCEDURE — G8427 DOCREV CUR MEDS BY ELIG CLIN: HCPCS | Performed by: OTOLARYNGOLOGY

## 2024-08-13 PROCEDURE — 69210 REMOVE IMPACTED EAR WAX UNI: CPT | Performed by: OTOLARYNGOLOGY

## 2024-08-13 NOTE — TELEPHONE ENCOUNTER
Patient was called back into clinic for his appointment. Upon walking down the moyer patient stopped to lean against the nurses station and reported being out of breath. Patient was roomed in a nearby room. After rooming process was complete, I heard beeping coming from patient's room #5. To me it sounded like his oxygen tank running low. I opened patient's chart and saw that his O2 had been documented being at 68% while rooming and was never reported to staff. Fellow MA grabbed pulse oximeter and checked the patient's O2 level which was documented to be 75%. I asked the patient if his oxygen was running out of portable tank and he stated that he had turned his oxygen flow rate down from 6 to 2 in order to preserve the battery. I advised patient that I would go get an O2 tank for him to use in office. Nurse hooked patient to O2 tank vitals began to steadily increase.     Electronically signed by Aggie Ji MA on 8/13/24 at 4:17 PM EDT

## 2024-08-13 NOTE — TELEPHONE ENCOUNTER
Staff approached me regarding patients pulse ox was low and not reported. Checked patients pulse ox, patient was 68% and was on 3L. Patient was immediately given our oxygen tank and placed on 6-8L which he is normally on. Pulse ox came up to 90-92%. Patient said typically his pulse ox is between 70-80's which is his norm. Patients battery life on tank was getting low and he decreased his liters so he did not run out of oxygen at this apt. Dr Dawson in room with patient and recommending ED for patient. Patient refused. Educated staff MA regarding pulse ox ranges. Recommended patient be taken down in wheelchair. Patient had backup oxygen tank in the car. No family present.

## 2024-08-13 NOTE — TELEPHONE ENCOUNTER
O2 checked after concern was raised regarding patient's oxygen - O2 read 75%. Dr. Dawson made aware and advised patient to go to ED. Patient refused. Patient stated he is OK to drive and O2 was 92% prior to leaving. Patient was helped out of office using wheelchair.    Electronically signed by Mary Vitale MA on 8/13/24 at 4:31 PM EDT

## 2024-08-13 NOTE — PROGRESS NOTES
Subjective:      Patient ID:  Jason Cox is a 74 y.o. male.    HPI:    Pt presents with a history of cerumen impaction removal.   The patients ear was last cleaned 1 year ago.   The patient was not using ear drops to loosen wax immediately prior to this visit. Pt had drop to 65% O2 sat, placed on oxygen in office. He was doing well prior to leaving and has a new battery for his oxygen tank. Advised patient to go to the ER if no improvement.       Hearing aids: no      Past Medical History:   Diagnosis Date    Acute and chronic respiratory failure with hypoxia ([facility]) 10/12/2017    chronic oxygen use    Acute exacerbation of chronic obstructive pulmonary disease (COPD) ([facility]) 8/7/2023    Acute heart failure with preserved ejection fraction ([facility]) 04/08/2021    follows with PCP    Acute respiratory disease due to severe acute respiratory syndrome coronavirus 2 (SARS-CoV-2) 01/01/2021    Acute respiratory failure with hypoxia ([facility]) 11/12/2018    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    Chronic back pain     Degeneration of umbar intervertebral disc    Colon cancer screening     COPD (chronic obstructive pulmonary disease) ([facility])     Coronavirus infection 02/02/2022    Coronavirus 229E    Cough with hemoptysis 04/23/2019    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

## 2024-08-14 VITALS
WEIGHT: 173 LBS | OXYGEN SATURATION: 92 % | SYSTOLIC BLOOD PRESSURE: 108 MMHG | HEART RATE: 96 BPM | HEIGHT: 73 IN | DIASTOLIC BLOOD PRESSURE: 77 MMHG | BODY MASS INDEX: 22.93 KG/M2 | TEMPERATURE: 97 F

## 2024-09-13 ENCOUNTER — TELEPHONE (OUTPATIENT)
Dept: NEUROSURGERY | Age: 74
End: 2024-09-13

## 2024-09-24 PROBLEM — J44.1 COPD EXACERBATION (HCC): Status: RESOLVED | Noted: 2024-03-28 | Resolved: 2024-09-24

## 2024-09-24 PROBLEM — J15.9 BACTERIAL PNEUMONIA: Status: RESOLVED | Noted: 2024-05-02 | Resolved: 2024-09-24

## 2024-09-24 PROBLEM — B34.2 CORONAVIRUS INFECTION: Status: RESOLVED | Noted: 2024-05-02 | Resolved: 2024-09-24

## 2024-09-24 PROBLEM — J18.9 PNEUMONIA OF LEFT LOWER LOBE DUE TO INFECTIOUS ORGANISM: Status: RESOLVED | Noted: 2024-03-28 | Resolved: 2024-09-24

## 2024-09-24 PROBLEM — J96.01 ACUTE RESPIRATORY FAILURE WITH HYPOXIA: Status: RESOLVED | Noted: 2018-11-12 | Resolved: 2024-09-24

## 2024-10-09 ENCOUNTER — APPOINTMENT (OUTPATIENT)
Dept: CT IMAGING | Age: 74
DRG: 189 | End: 2024-10-09
Payer: MEDICARE

## 2024-10-09 ENCOUNTER — HOSPITAL ENCOUNTER (INPATIENT)
Age: 74
LOS: 6 days | Discharge: HOME | DRG: 189 | End: 2024-10-15
Attending: EMERGENCY MEDICINE | Admitting: INTERNAL MEDICINE
Payer: MEDICARE

## 2024-10-09 DIAGNOSIS — J18.9 PNEUMONIA DUE TO INFECTIOUS ORGANISM, UNSPECIFIED LATERALITY, UNSPECIFIED PART OF LUNG: ICD-10-CM

## 2024-10-09 DIAGNOSIS — J44.1 ACUTE EXACERBATION OF CHRONIC OBSTRUCTIVE PULMONARY DISEASE (COPD) (HCC): Primary | ICD-10-CM

## 2024-10-09 DIAGNOSIS — R06.02 SHORTNESS OF BREATH: ICD-10-CM

## 2024-10-09 DIAGNOSIS — J96.01 ACUTE RESPIRATORY FAILURE WITH HYPOXIA: ICD-10-CM

## 2024-10-09 PROBLEM — J96.21 ACUTE ON CHRONIC HYPOXIC RESPIRATORY FAILURE: Status: ACTIVE | Noted: 2024-10-09

## 2024-10-09 LAB
ALBUMIN SERPL-MCNC: 4.3 G/DL (ref 3.5–5.2)
ALP SERPL-CCNC: 96 U/L (ref 40–129)
ALT SERPL-CCNC: 7 U/L (ref 0–40)
ANION GAP SERPL CALCULATED.3IONS-SCNC: 11 MMOL/L (ref 7–16)
AST SERPL-CCNC: 26 U/L (ref 0–39)
B PARAP IS1001 DNA NPH QL NAA+NON-PROBE: NOT DETECTED
B PERT DNA SPEC QL NAA+PROBE: NOT DETECTED
BASOPHILS # BLD: 0.05 K/UL (ref 0–0.2)
BASOPHILS NFR BLD: 1 % (ref 0–2)
BILIRUB SERPL-MCNC: 0.9 MG/DL (ref 0–1.2)
BNP SERPL-MCNC: 1433 PG/ML (ref 0–125)
BUN SERPL-MCNC: 16 MG/DL (ref 6–23)
C PNEUM DNA NPH QL NAA+NON-PROBE: NOT DETECTED
CALCIUM SERPL-MCNC: 9.8 MG/DL (ref 8.6–10.2)
CHLORIDE SERPL-SCNC: 101 MMOL/L (ref 98–107)
CO2 SERPL-SCNC: 25 MMOL/L (ref 22–29)
CREAT SERPL-MCNC: 0.8 MG/DL (ref 0.7–1.2)
EKG ATRIAL RATE: 90 BPM
EKG P AXIS: 64 DEGREES
EKG P-R INTERVAL: 186 MS
EKG Q-T INTERVAL: 364 MS
EKG QRS DURATION: 92 MS
EKG QTC CALCULATION (BAZETT): 445 MS
EKG R AXIS: 76 DEGREES
EKG T AXIS: -19 DEGREES
EKG VENTRICULAR RATE: 90 BPM
EOSINOPHIL # BLD: 0.06 K/UL (ref 0.05–0.5)
EOSINOPHILS RELATIVE PERCENT: 1 % (ref 0–6)
ERYTHROCYTE [DISTWIDTH] IN BLOOD BY AUTOMATED COUNT: 15 % (ref 11.5–15)
FLUAV RNA NPH QL NAA+NON-PROBE: NOT DETECTED
FLUBV RNA NPH QL NAA+NON-PROBE: NOT DETECTED
GFR, ESTIMATED: >90 ML/MIN/1.73M2
GLUCOSE SERPL-MCNC: 105 MG/DL (ref 74–99)
HADV DNA NPH QL NAA+NON-PROBE: NOT DETECTED
HCOV 229E RNA NPH QL NAA+NON-PROBE: NOT DETECTED
HCOV HKU1 RNA NPH QL NAA+NON-PROBE: NOT DETECTED
HCOV NL63 RNA NPH QL NAA+NON-PROBE: NOT DETECTED
HCOV OC43 RNA NPH QL NAA+NON-PROBE: NOT DETECTED
HCT VFR BLD AUTO: 52.2 % (ref 37–54)
HGB BLD-MCNC: 17.4 G/DL (ref 12.5–16.5)
HMPV RNA NPH QL NAA+NON-PROBE: NOT DETECTED
HPIV1 RNA NPH QL NAA+NON-PROBE: NOT DETECTED
HPIV2 RNA NPH QL NAA+NON-PROBE: NOT DETECTED
HPIV3 RNA NPH QL NAA+NON-PROBE: NOT DETECTED
HPIV4 RNA NPH QL NAA+NON-PROBE: NOT DETECTED
IMM GRANULOCYTES # BLD AUTO: 0.04 K/UL (ref 0–0.58)
IMM GRANULOCYTES NFR BLD: 0 % (ref 0–5)
LYMPHOCYTES NFR BLD: 1.47 K/UL (ref 1.5–4)
LYMPHOCYTES RELATIVE PERCENT: 16 % (ref 20–42)
M PNEUMO DNA NPH QL NAA+NON-PROBE: NOT DETECTED
MCH RBC QN AUTO: 30.8 PG (ref 26–35)
MCHC RBC AUTO-ENTMCNC: 33.3 G/DL (ref 32–34.5)
MCV RBC AUTO: 92.4 FL (ref 80–99.9)
MONOCYTES NFR BLD: 0.51 K/UL (ref 0.1–0.95)
MONOCYTES NFR BLD: 6 % (ref 2–12)
NEUTROPHILS NFR BLD: 77 % (ref 43–80)
NEUTS SEG NFR BLD: 7.1 K/UL (ref 1.8–7.3)
PLATELET # BLD AUTO: 246 K/UL (ref 130–450)
PMV BLD AUTO: 10.7 FL (ref 7–12)
POTASSIUM SERPL-SCNC: 4.1 MMOL/L (ref 3.5–5)
PROT SERPL-MCNC: 7.1 G/DL (ref 6.4–8.3)
RBC # BLD AUTO: 5.65 M/UL (ref 3.8–5.8)
RSV RNA NPH QL NAA+NON-PROBE: NOT DETECTED
RV+EV RNA NPH QL NAA+NON-PROBE: NOT DETECTED
SARS-COV-2 RDRP RESP QL NAA+PROBE: NOT DETECTED
SARS-COV-2 RNA NPH QL NAA+NON-PROBE: NOT DETECTED
SODIUM SERPL-SCNC: 137 MMOL/L (ref 132–146)
SPECIMEN DESCRIPTION: NORMAL
SPECIMEN DESCRIPTION: NORMAL
TROPONIN I SERPL HS-MCNC: 11 NG/L (ref 0–11)
TROPONIN I SERPL HS-MCNC: 8 NG/L (ref 0–11)
WBC OTHER # BLD: 9.2 K/UL (ref 4.5–11.5)

## 2024-10-09 PROCEDURE — 94640 AIRWAY INHALATION TREATMENT: CPT

## 2024-10-09 PROCEDURE — 6370000000 HC RX 637 (ALT 250 FOR IP): Performed by: INTERNAL MEDICINE

## 2024-10-09 PROCEDURE — 83880 ASSAY OF NATRIURETIC PEPTIDE: CPT

## 2024-10-09 PROCEDURE — 71275 CT ANGIOGRAPHY CHEST: CPT

## 2024-10-09 PROCEDURE — 6370000000 HC RX 637 (ALT 250 FOR IP)

## 2024-10-09 PROCEDURE — 6360000002 HC RX W HCPCS: Performed by: INTERNAL MEDICINE

## 2024-10-09 PROCEDURE — 96365 THER/PROPH/DIAG IV INF INIT: CPT

## 2024-10-09 PROCEDURE — 6360000004 HC RX CONTRAST MEDICATION: Performed by: RADIOLOGY

## 2024-10-09 PROCEDURE — 99223 1ST HOSP IP/OBS HIGH 75: CPT | Performed by: INTERNAL MEDICINE

## 2024-10-09 PROCEDURE — 6370000000 HC RX 637 (ALT 250 FOR IP): Performed by: EMERGENCY MEDICINE

## 2024-10-09 PROCEDURE — 0202U NFCT DS 22 TRGT SARS-COV-2: CPT

## 2024-10-09 PROCEDURE — 94664 DEMO&/EVAL PT USE INHALER: CPT

## 2024-10-09 PROCEDURE — 85025 COMPLETE CBC W/AUTO DIFF WBC: CPT

## 2024-10-09 PROCEDURE — 96375 TX/PRO/DX INJ NEW DRUG ADDON: CPT

## 2024-10-09 PROCEDURE — 87635 SARS-COV-2 COVID-19 AMP PRB: CPT

## 2024-10-09 PROCEDURE — 2060000000 HC ICU INTERMEDIATE R&B

## 2024-10-09 PROCEDURE — 2580000003 HC RX 258: Performed by: INTERNAL MEDICINE

## 2024-10-09 PROCEDURE — 6360000002 HC RX W HCPCS

## 2024-10-09 PROCEDURE — 93005 ELECTROCARDIOGRAM TRACING: CPT

## 2024-10-09 PROCEDURE — 99285 EMERGENCY DEPT VISIT HI MDM: CPT

## 2024-10-09 PROCEDURE — 5A09557 ASSISTANCE WITH RESPIRATORY VENTILATION, GREATER THAN 96 CONSECUTIVE HOURS, CONTINUOUS POSITIVE AIRWAY PRESSURE: ICD-10-PCS | Performed by: INTERNAL MEDICINE

## 2024-10-09 PROCEDURE — 84484 ASSAY OF TROPONIN QUANT: CPT

## 2024-10-09 PROCEDURE — 2700000000 HC OXYGEN THERAPY PER DAY

## 2024-10-09 PROCEDURE — 93010 ELECTROCARDIOGRAM REPORT: CPT | Performed by: INTERNAL MEDICINE

## 2024-10-09 PROCEDURE — 80053 COMPREHEN METABOLIC PANEL: CPT

## 2024-10-09 PROCEDURE — 96366 THER/PROPH/DIAG IV INF ADDON: CPT

## 2024-10-09 RX ORDER — BUMETANIDE 1 MG/1
1 TABLET ORAL
Status: DISCONTINUED | OUTPATIENT
Start: 2024-10-09 | End: 2024-10-15 | Stop reason: HOSPADM

## 2024-10-09 RX ORDER — LEVOFLOXACIN 5 MG/ML
750 INJECTION, SOLUTION INTRAVENOUS EVERY 24 HOURS
Status: DISCONTINUED | OUTPATIENT
Start: 2024-10-09 | End: 2024-10-09

## 2024-10-09 RX ORDER — IPRATROPIUM BROMIDE AND ALBUTEROL SULFATE 2.5; .5 MG/3ML; MG/3ML
1 SOLUTION RESPIRATORY (INHALATION) EVERY 6 HOURS PRN
Status: DISCONTINUED | OUTPATIENT
Start: 2024-10-09 | End: 2024-10-15 | Stop reason: HOSPADM

## 2024-10-09 RX ORDER — AZITHROMYCIN 250 MG/1
500 TABLET, FILM COATED ORAL
Status: DISCONTINUED | OUTPATIENT
Start: 2024-10-09 | End: 2024-10-15 | Stop reason: HOSPADM

## 2024-10-09 RX ORDER — SODIUM CHLORIDE 0.9 % (FLUSH) 0.9 %
5-40 SYRINGE (ML) INJECTION PRN
Status: DISCONTINUED | OUTPATIENT
Start: 2024-10-09 | End: 2024-10-15 | Stop reason: HOSPADM

## 2024-10-09 RX ORDER — METHYLPREDNISOLONE SODIUM SUCCINATE 125 MG/2ML
125 INJECTION, POWDER, LYOPHILIZED, FOR SOLUTION INTRAMUSCULAR; INTRAVENOUS ONCE
Status: COMPLETED | OUTPATIENT
Start: 2024-10-09 | End: 2024-10-09

## 2024-10-09 RX ORDER — MAGNESIUM SULFATE IN WATER 40 MG/ML
2000 INJECTION, SOLUTION INTRAVENOUS ONCE
Status: COMPLETED | OUTPATIENT
Start: 2024-10-09 | End: 2024-10-09

## 2024-10-09 RX ORDER — METHYLPREDNISOLONE SODIUM SUCCINATE 40 MG/ML
40 INJECTION, POWDER, LYOPHILIZED, FOR SOLUTION INTRAMUSCULAR; INTRAVENOUS EVERY 12 HOURS
Status: COMPLETED | OUTPATIENT
Start: 2024-10-10 | End: 2024-10-13

## 2024-10-09 RX ORDER — MAGNESIUM SULFATE IN WATER 40 MG/ML
2000 INJECTION, SOLUTION INTRAVENOUS PRN
Status: DISCONTINUED | OUTPATIENT
Start: 2024-10-09 | End: 2024-10-15 | Stop reason: HOSPADM

## 2024-10-09 RX ORDER — ACETAMINOPHEN 325 MG/1
650 TABLET ORAL EVERY 6 HOURS PRN
Status: DISCONTINUED | OUTPATIENT
Start: 2024-10-09 | End: 2024-10-15 | Stop reason: HOSPADM

## 2024-10-09 RX ORDER — IOPAMIDOL 755 MG/ML
75 INJECTION, SOLUTION INTRAVASCULAR
Status: COMPLETED | OUTPATIENT
Start: 2024-10-09 | End: 2024-10-09

## 2024-10-09 RX ORDER — ONDANSETRON 4 MG/1
4 TABLET, ORALLY DISINTEGRATING ORAL EVERY 8 HOURS PRN
Status: DISCONTINUED | OUTPATIENT
Start: 2024-10-09 | End: 2024-10-15 | Stop reason: HOSPADM

## 2024-10-09 RX ORDER — IPRATROPIUM BROMIDE AND ALBUTEROL SULFATE 2.5; .5 MG/3ML; MG/3ML
1 SOLUTION RESPIRATORY (INHALATION)
Status: COMPLETED | OUTPATIENT
Start: 2024-10-09 | End: 2024-10-09

## 2024-10-09 RX ORDER — BUMETANIDE 0.25 MG/ML
2 INJECTION INTRAMUSCULAR; INTRAVENOUS ONCE
Status: DISCONTINUED | OUTPATIENT
Start: 2024-10-09 | End: 2024-10-15 | Stop reason: HOSPADM

## 2024-10-09 RX ORDER — POLYETHYLENE GLYCOL 3350 17 G/17G
17 POWDER, FOR SOLUTION ORAL DAILY PRN
Status: DISCONTINUED | OUTPATIENT
Start: 2024-10-09 | End: 2024-10-15 | Stop reason: HOSPADM

## 2024-10-09 RX ORDER — SODIUM CHLORIDE 9 MG/ML
INJECTION, SOLUTION INTRAVENOUS PRN
Status: DISCONTINUED | OUTPATIENT
Start: 2024-10-09 | End: 2024-10-15 | Stop reason: HOSPADM

## 2024-10-09 RX ORDER — ENOXAPARIN SODIUM 100 MG/ML
40 INJECTION SUBCUTANEOUS DAILY
Status: DISCONTINUED | OUTPATIENT
Start: 2024-10-09 | End: 2024-10-15 | Stop reason: HOSPADM

## 2024-10-09 RX ORDER — ONDANSETRON 2 MG/ML
4 INJECTION INTRAMUSCULAR; INTRAVENOUS EVERY 6 HOURS PRN
Status: DISCONTINUED | OUTPATIENT
Start: 2024-10-09 | End: 2024-10-15 | Stop reason: HOSPADM

## 2024-10-09 RX ORDER — IPRATROPIUM BROMIDE AND ALBUTEROL SULFATE 2.5; .5 MG/3ML; MG/3ML
3 SOLUTION RESPIRATORY (INHALATION) ONCE
Status: COMPLETED | OUTPATIENT
Start: 2024-10-09 | End: 2024-10-09

## 2024-10-09 RX ORDER — ACETAMINOPHEN 650 MG/1
650 SUPPOSITORY RECTAL EVERY 6 HOURS PRN
Status: DISCONTINUED | OUTPATIENT
Start: 2024-10-09 | End: 2024-10-15 | Stop reason: HOSPADM

## 2024-10-09 RX ORDER — SODIUM CHLORIDE 0.9 % (FLUSH) 0.9 %
5-40 SYRINGE (ML) INJECTION EVERY 12 HOURS SCHEDULED
Status: DISCONTINUED | OUTPATIENT
Start: 2024-10-09 | End: 2024-10-15 | Stop reason: HOSPADM

## 2024-10-09 RX ORDER — ARFORMOTEROL TARTRATE 15 UG/2ML
15 SOLUTION RESPIRATORY (INHALATION)
Status: DISCONTINUED | OUTPATIENT
Start: 2024-10-09 | End: 2024-10-15 | Stop reason: HOSPADM

## 2024-10-09 RX ORDER — POTASSIUM CHLORIDE 7.45 MG/ML
10 INJECTION INTRAVENOUS PRN
Status: DISCONTINUED | OUTPATIENT
Start: 2024-10-09 | End: 2024-10-15 | Stop reason: HOSPADM

## 2024-10-09 RX ORDER — OXYCODONE HYDROCHLORIDE 5 MG/1
2.5 TABLET ORAL 2 TIMES DAILY PRN
Status: DISCONTINUED | OUTPATIENT
Start: 2024-10-09 | End: 2024-10-15 | Stop reason: HOSPADM

## 2024-10-09 RX ORDER — POTASSIUM CHLORIDE 1500 MG/1
40 TABLET, EXTENDED RELEASE ORAL PRN
Status: DISCONTINUED | OUTPATIENT
Start: 2024-10-09 | End: 2024-10-15 | Stop reason: HOSPADM

## 2024-10-09 RX ORDER — OXYCODONE AND ACETAMINOPHEN 5; 325 MG/1; MG/1
1 TABLET ORAL 2 TIMES DAILY PRN
Status: DISCONTINUED | OUTPATIENT
Start: 2024-10-09 | End: 2024-10-15 | Stop reason: HOSPADM

## 2024-10-09 RX ORDER — OXYCODONE AND ACETAMINOPHEN 7.5; 325 MG/1; MG/1
1 TABLET ORAL 2 TIMES DAILY PRN
Status: DISCONTINUED | OUTPATIENT
Start: 2024-10-09 | End: 2024-10-09 | Stop reason: CLARIF

## 2024-10-09 RX ORDER — BUDESONIDE 0.5 MG/2ML
500 INHALANT ORAL
Status: DISCONTINUED | OUTPATIENT
Start: 2024-10-09 | End: 2024-10-15 | Stop reason: HOSPADM

## 2024-10-09 RX ADMIN — IOPAMIDOL 75 ML: 755 INJECTION, SOLUTION INTRAVENOUS at 14:49

## 2024-10-09 RX ADMIN — SODIUM CHLORIDE, PRESERVATIVE FREE 10 ML: 5 INJECTION INTRAVENOUS at 20:40

## 2024-10-09 RX ADMIN — IPRATROPIUM BROMIDE AND ALBUTEROL SULFATE 1 DOSE: 2.5; .5 SOLUTION RESPIRATORY (INHALATION) at 16:25

## 2024-10-09 RX ADMIN — METHYLPREDNISOLONE SODIUM SUCCINATE 40 MG: 40 INJECTION, POWDER, LYOPHILIZED, FOR SOLUTION INTRAMUSCULAR; INTRAVENOUS at 23:22

## 2024-10-09 RX ADMIN — ARFORMOTEROL TARTRATE 15 MCG: 15 SOLUTION RESPIRATORY (INHALATION) at 19:47

## 2024-10-09 RX ADMIN — MAGNESIUM SULFATE HEPTAHYDRATE 2000 MG: 40 INJECTION, SOLUTION INTRAVENOUS at 11:53

## 2024-10-09 RX ADMIN — ENOXAPARIN SODIUM 40 MG: 100 INJECTION SUBCUTANEOUS at 20:35

## 2024-10-09 RX ADMIN — IPRATROPIUM BROMIDE AND ALBUTEROL SULFATE 1 DOSE: 2.5; .5 SOLUTION RESPIRATORY (INHALATION) at 16:24

## 2024-10-09 RX ADMIN — METHYLPREDNISOLONE SODIUM SUCCINATE 125 MG: 125 INJECTION INTRAMUSCULAR; INTRAVENOUS at 11:52

## 2024-10-09 RX ADMIN — BUDESONIDE 500 MCG: 0.5 SUSPENSION RESPIRATORY (INHALATION) at 19:47

## 2024-10-09 RX ADMIN — BUMETANIDE 1 MG: 1 TABLET ORAL at 20:34

## 2024-10-09 RX ADMIN — IPRATROPIUM BROMIDE AND ALBUTEROL SULFATE 3 DOSE: .5; 2.5 SOLUTION RESPIRATORY (INHALATION) at 11:32

## 2024-10-09 RX ADMIN — IPRATROPIUM BROMIDE AND ALBUTEROL SULFATE 1 DOSE: 2.5; .5 SOLUTION RESPIRATORY (INHALATION) at 19:47

## 2024-10-09 RX ADMIN — IPRATROPIUM BROMIDE AND ALBUTEROL SULFATE 1 DOSE: 2.5; .5 SOLUTION RESPIRATORY (INHALATION) at 16:21

## 2024-10-09 ASSESSMENT — PAIN - FUNCTIONAL ASSESSMENT: PAIN_FUNCTIONAL_ASSESSMENT: NONE - DENIES PAIN

## 2024-10-09 NOTE — ED NOTES
ED to Inpatient Handoff Report    Notified Bere that electronic handoff available and patient ready for transport to room 607.    Safety Risks: Risk of falls    Patient in Restraints: no    Constant Observer or Patient : no    Telemetry Monitoring Ordered :Yes      Cardiac Rhythm: Sinus rhythm    Order to transfer to unit without monitor:NO    Last MEWS: 2 Time completed: 1758    Deterioration Index Score:   Predictive Model Details          35 (Caution)  Factor Value    Calculated 10/9/2024 18:00 33% Age 74 years old    Deterioration Index Model 32% Supplemental oxygen High flow nasal cannula     26% Systolic 87     4% Pulse 97     2% Potassium 4.1 mmol/L     2% WBC count 9.2 k/uL     1% Sodium 137 mmol/L     1% Hematocrit 52.2 %     0% Temperature 97.8 °F (36.6 °C)     0% Pulse oximetry 96 %     0% Respiratory rate 16        Vitals:    10/09/24 1621 10/09/24 1624 10/09/24 1634 10/09/24 1758   BP:    (!) 87/59   Pulse: 76 85 78 97   Resp:    16   Temp:    97.8 °F (36.6 °C)   TempSrc:    Oral   SpO2: 94% 97% 99% 96%   Weight:       Height:             Opportunity for questions and clarification was provided.

## 2024-10-09 NOTE — ED PROVIDER NOTES
[facility] EMERGENCY DEPARTMENT  EMERGENCY DEPARTMENT ENCOUNTER        Pt Name: Jason Cox  MRN: [id number]  Birthdate 1950  Date of evaluation: 10/9/2024  Provider: Soheila Patel DO  PCP: Juni Loyd MD  Note Started: 11:13 AM EDT 10/9/24    CHIEF COMPLAINT       Chief Complaint   Patient presents with    Respiratory Distress     70% 5 liters @ pivot       HISTORY OF PRESENT ILLNESS: 1 or more Elements   History From: Patient    Limitations to history : None  Social Determinants : None    Jason Cox is a 74 y.o. male who presents for respiratory distress.  Patient states he has had increased shortness of breath over the last week.  He states he has history of COPD and is chronically on oxygen via nasal cannula.  He states he has been  He states running on 7 L via nasal cannula at home over the last few days due to his increased shortness of breath.  He states he has had a cough, not productive of much sputum.  He denies any fever or chills at home.  He denies any history of blood clots.  He has had some chest tightness over the past few days.  It is not worse with exertion.  He is not on any blood thinners.  Denies any fever, chills, n/v, headache, dizziness, vision changes, neck tenderness or stiffness, weakness,  palpitations, leg swelling/tenderness, abd pain, dysuria, hematuria, diarrhea, constipation, bloody stools.    Nursing Notes were all reviewed and agreed with or any disagreements were addressed in the HPI.    ROS:   Pertinent positives and negatives are stated within HPI, all other systems reviewed and are negative.      --------------------------------------------- PAST HISTORY ---------------------------------------------  Past Medical History:  has a past medical history of Acute and chronic respiratory failure with hypoxia, Acute exacerbation of chronic obstructive pulmonary disease (COPD) ([facility]), Acute heart failure with preserved

## 2024-10-09 NOTE — H&P
[facility]ist [facility] History and Physical      CHIEF COMPLAINT:  shortness of breath, cough    History of Present Illness: This is 74-year-old male with a past medical history of COPD, chronic respiratory failure with hypoxia who uses 5 L nasal cannula at baseline, history of pulmonary Mycobacterium avium complex, chronic pain syndrome, chronic bilateral lower back pain, IgG deficiency who presents to the hospital with shortness of breath and increased sputum production. Patient does see Dr Little for pulmonology. He currently denies any fevers, chills, chest pain, palpitations, nausea, vomiting, diarrhea.     Informant(s) for H&P: Patient, EMR    REVIEW OF SYSTEMS:  A comprehensive review of systems was negative except for: what is in the HPI      PMH:  Past Medical History:   Diagnosis Date    Acute and chronic respiratory failure with hypoxia 10/12/2017    chronic oxygen use    Acute exacerbation of chronic obstructive pulmonary disease (COPD) ([facility]) 8/7/2023    Acute heart failure with preserved ejection fraction ([facility]) 04/08/2021    follows with PCP    Acute respiratory disease due to severe acute respiratory syndrome coronavirus 2 (SARS-CoV-2) 01/01/2021    Acute respiratory failure with hypoxia 11/12/2018    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    Chronic back pain     Degeneration of umbar intervertebral disc    Colon cancer screening     COPD (chronic obstructive pulmonary disease) ([facility])     Coronavirus infection 02/02/2022    Coronavirus 229E    Cough with hemoptysis 04/23/2019    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

## 2024-10-10 LAB
ANION GAP SERPL CALCULATED.3IONS-SCNC: 14 MMOL/L (ref 7–16)
BASOPHILS # BLD: 0 K/UL (ref 0–0.2)
BASOPHILS NFR BLD: 0 % (ref 0–2)
BUN SERPL-MCNC: 15 MG/DL (ref 6–23)
CALCIUM SERPL-MCNC: 9.7 MG/DL (ref 8.6–10.2)
CHLORIDE SERPL-SCNC: 99 MMOL/L (ref 98–107)
CO2 SERPL-SCNC: 22 MMOL/L (ref 22–29)
CREAT SERPL-MCNC: 0.7 MG/DL (ref 0.7–1.2)
EOSINOPHIL # BLD: 0 K/UL (ref 0.05–0.5)
EOSINOPHILS RELATIVE PERCENT: 0 % (ref 0–6)
ERYTHROCYTE [DISTWIDTH] IN BLOOD BY AUTOMATED COUNT: 14.7 % (ref 11.5–15)
GFR, ESTIMATED: >90 ML/MIN/1.73M2
GLUCOSE SERPL-MCNC: 152 MG/DL (ref 74–99)
HCT VFR BLD AUTO: 48 % (ref 37–54)
HGB BLD-MCNC: 15.5 G/DL (ref 12.5–16.5)
LYMPHOCYTES NFR BLD: 0.35 K/UL (ref 1.5–4)
LYMPHOCYTES RELATIVE PERCENT: 4 % (ref 20–42)
MCH RBC QN AUTO: 30.4 PG (ref 26–35)
MCHC RBC AUTO-ENTMCNC: 32.3 G/DL (ref 32–34.5)
MCV RBC AUTO: 94.1 FL (ref 80–99.9)
MONOCYTES NFR BLD: 0.21 K/UL (ref 0.1–0.95)
MONOCYTES NFR BLD: 3 % (ref 2–12)
NEUTROPHILS NFR BLD: 93 % (ref 43–80)
NEUTS SEG NFR BLD: 7.44 K/UL (ref 1.8–7.3)
PLATELET # BLD AUTO: 204 K/UL (ref 130–450)
PMV BLD AUTO: 11.1 FL (ref 7–12)
POTASSIUM SERPL-SCNC: 4.2 MMOL/L (ref 3.5–5)
RBC # BLD AUTO: 5.1 M/UL (ref 3.8–5.8)
RBC # BLD: NORMAL 10*6/UL
SODIUM SERPL-SCNC: 135 MMOL/L (ref 132–146)
WBC OTHER # BLD: 8 K/UL (ref 4.5–11.5)

## 2024-10-10 PROCEDURE — 94667 MNPJ CHEST WALL 1ST: CPT

## 2024-10-10 PROCEDURE — 2580000003 HC RX 258: Performed by: INTERNAL MEDICINE

## 2024-10-10 PROCEDURE — 85025 COMPLETE CBC W/AUTO DIFF WBC: CPT

## 2024-10-10 PROCEDURE — 6360000002 HC RX W HCPCS: Performed by: INTERNAL MEDICINE

## 2024-10-10 PROCEDURE — 2700000000 HC OXYGEN THERAPY PER DAY

## 2024-10-10 PROCEDURE — 80048 BASIC METABOLIC PNL TOTAL CA: CPT

## 2024-10-10 PROCEDURE — 36415 COLL VENOUS BLD VENIPUNCTURE: CPT

## 2024-10-10 PROCEDURE — 6370000000 HC RX 637 (ALT 250 FOR IP): Performed by: INTERNAL MEDICINE

## 2024-10-10 PROCEDURE — 2060000000 HC ICU INTERMEDIATE R&B

## 2024-10-10 PROCEDURE — 99232 SBSQ HOSP IP/OBS MODERATE 35: CPT | Performed by: STUDENT IN AN ORGANIZED HEALTH CARE EDUCATION/TRAINING PROGRAM

## 2024-10-10 PROCEDURE — 94640 AIRWAY INHALATION TREATMENT: CPT

## 2024-10-10 RX ORDER — SODIUM CHLORIDE FOR INHALATION 3 %
4 VIAL, NEBULIZER (ML) INHALATION 2 TIMES DAILY
Status: DISCONTINUED | OUTPATIENT
Start: 2024-10-10 | End: 2024-10-15 | Stop reason: HOSPADM

## 2024-10-10 RX ADMIN — Medication 4 ML: at 20:25

## 2024-10-10 RX ADMIN — METHYLPREDNISOLONE SODIUM SUCCINATE 40 MG: 40 INJECTION, POWDER, LYOPHILIZED, FOR SOLUTION INTRAMUSCULAR; INTRAVENOUS at 11:24

## 2024-10-10 RX ADMIN — METHYLPREDNISOLONE SODIUM SUCCINATE 40 MG: 40 INJECTION, POWDER, LYOPHILIZED, FOR SOLUTION INTRAMUSCULAR; INTRAVENOUS at 23:32

## 2024-10-10 RX ADMIN — SODIUM CHLORIDE, PRESERVATIVE FREE 10 ML: 5 INJECTION INTRAVENOUS at 08:19

## 2024-10-10 RX ADMIN — ARFORMOTEROL TARTRATE 15 MCG: 15 SOLUTION RESPIRATORY (INHALATION) at 20:25

## 2024-10-10 RX ADMIN — OXYCODONE 2.5 MG: 5 TABLET ORAL at 13:40

## 2024-10-10 RX ADMIN — BUDESONIDE 500 MCG: 0.5 SUSPENSION RESPIRATORY (INHALATION) at 20:25

## 2024-10-10 RX ADMIN — OXYCODONE HYDROCHLORIDE AND ACETAMINOPHEN 1 TABLET: 5; 325 TABLET ORAL at 13:40

## 2024-10-10 RX ADMIN — ENOXAPARIN SODIUM 40 MG: 100 INJECTION SUBCUTANEOUS at 19:44

## 2024-10-10 RX ADMIN — ARFORMOTEROL TARTRATE 15 MCG: 15 SOLUTION RESPIRATORY (INHALATION) at 07:56

## 2024-10-10 RX ADMIN — BUDESONIDE 500 MCG: 0.5 SUSPENSION RESPIRATORY (INHALATION) at 07:56

## 2024-10-10 ASSESSMENT — PAIN DESCRIPTION - FREQUENCY: FREQUENCY: INTERMITTENT

## 2024-10-10 ASSESSMENT — PAIN SCALES - GENERAL
PAINLEVEL_OUTOF10: 0
PAINLEVEL_OUTOF10: 7
PAINLEVEL_OUTOF10: 0

## 2024-10-10 ASSESSMENT — PAIN DESCRIPTION - PAIN TYPE: TYPE: CHRONIC PAIN

## 2024-10-10 ASSESSMENT — PAIN DESCRIPTION - ORIENTATION: ORIENTATION: RIGHT;LEFT

## 2024-10-10 ASSESSMENT — PAIN DESCRIPTION - DESCRIPTORS: DESCRIPTORS: ACHING;CRAMPING;DISCOMFORT

## 2024-10-10 ASSESSMENT — PAIN DESCRIPTION - ONSET: ONSET: GRADUAL

## 2024-10-10 ASSESSMENT — PAIN DESCRIPTION - LOCATION: LOCATION: LEG

## 2024-10-10 NOTE — CONSULTS
Pulmonary Consultation    Admit Date: 10/9/2024  Requesting Physician: Juni Loyd MD    Reason for consultation:  Shortness of breath  HPI:  The patient is a 74-year-old male with end-stage chronic obstructive pulmonary disease, history of Mycobacterium AVM complex superinfection as well as Pseudomonas infection.  He presents the hospital with increasing shortness of breath and worsening hypoxia.  The patient is normally on substantial supplemental oxygen at home, about 5 L, this increased to 10 L upon admission but is coming back down.  A CT scan failed to evidence new infiltrates.  The patient is concerned about mucous plugging.    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    Chronic back pain     Degeneration of umbar intervertebral disc    Colon cancer screening     COPD (chronic obstructive pulmonary disease) ([facility])     Coronavirus infection 02/02/2022    Coronavirus 229E    Cough with hemoptysis 04/23/2019    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      Please contact us with any questions. Office [phone] or after hours through [employer], x [phone].    Please note that voice recognition technology was used (while wearing a Covid mandated mask) in the preparation of this note and make therefore it may contain inadvertent transcription errors.  If the patient is a COVID 19 isolation patient, the above physical exam reflects that of the examining physician for the day.    Rolf Little MD,  M.D., F.C.C.P.    [facility]    [facility], [address] [place] [address], [address], [place] [address]  Office Visits:  [address] [facility], [place] [address]

## 2024-10-10 NOTE — CARE COORDINATION
Introduced my self and provided explanation of CM role to patient. Admitted for chronic chronic hypoxic respiratory failure. Hx of COPD, pulmonary Mycobacterium avium complex. Pt. Currently on 10L HFNC. Pt. states at home he wears a baseline 6-7L O2 nc home supplier is [employer]. He has home concentrator 10L flow and an [employer] concentrator. He follows w/Dr. Little await plan. Cardiology cx pending, await plan. PO zithromax, IV bumex, IV mag, IV potassium, & nebs. He voices he resides in a one story home  with his spouse and completes his adl's with independence, w/o assistive devices. Patient is established with Dr. Loyd and uses [facility]'s pharmacy. Anticipated to discharge home with no needs. Will continue to follow.  Charito SUAREZN, RN  Case Management

## 2024-10-10 NOTE — PROGRESS NOTES
[facility] Quality Flow/Interdisciplinary Rounds Progress Note        Quality Flow Rounds held on October 10, 2024    Disciplines Attending:  Bedside Nurse, , , and Nursing Unit Leadership    Jason Cox was admitted on 10/9/2024 11:02 AM    Anticipated Discharge Date:       Disposition:    Sandro Score:  Sandro Scale Score: 19    Readmission Risk              Risk of Unplanned Readmission:  18           Discussed patient goal for the day, patient clinical progression, and barriers to discharge.  The following Goal(s) of the Day/Commitment(s) have been identified:   Wean 02 as tolerated, IV Solumedrol       Eliana Posadas RN  October 10, 2024

## 2024-10-10 NOTE — ACP (ADVANCE CARE PLANNING)
Advance Care Planning   Healthcare Decision Maker:    Primary Decision Maker: Tara Cox CLARENCE - Spouse - [phone]    Click here to complete Healthcare Decision Makers including selection of the Healthcare Decision Maker Relationship (ie \"Primary\").  Today we documented Decision Maker(s) consistent with Legal Next of Kin hierarchy.

## 2024-10-10 NOTE — CONSULTS
infection (HCC), Rhinovirus infection, Right lower lobe pneumonia, RSV (acute bronchiolitis due to respiratory syncytial virus), S/P lumbar fusion, and Thoracic ascending aortic aneurysm (HCC).    Surgical History:   has a past surgical history that includes Abscess Drainage (); Colonoscopy (2013); lumbar fusion (2019); bronchoscopy (N/A, 2019); bronchoscopy (N/A, 10/07/2019); Insert Picc Cath, 5/> Yrs (04/10/2021); bronchoscopy (N/A, 2021); Colonoscopy (N/A, 2023); bronchoscopy (Left, 2024); and bronchoscopy (N/A, 2024).     Social History:   reports that he quit smoking about 8 years ago. His smoking use included cigarettes. He started smoking about 56 years ago. He has a 96 pack-year smoking history. He has never used smokeless tobacco. He reports that he does not drink alcohol and does not use drugs.     Family History:  family history includes Other in his father and mother.  Both parents  secondary to meniscus of old age.    Medications:  Prior to Admission medications    Medication Sig Start Date End Date Taking? Authorizing Provider   bumetanide (BUMEX) 1 MG tablet Take 1 tablet by mouth three times a week *MON-WED-FRI* 24 Yes Juni Loyd MD   fluticasone (FLONASE) 50 MCG/ACT nasal spray 1 spray by Each Nostril route daily 24  Yes Hrdony, Mayur, DO   ipratropium 0.5 mg-albuterol 2.5 mg (DUONEB) 0.5-2.5 (3) MG/3ML SOLN nebulizer solution Inhale 3 mLs into the lungs every 6 hours as needed for Shortness of Breath 24  Yes Alejandro Sargent MD   budesonide (PULMICORT) 0.5 MG/2ML nebulizer suspension Take 2 mLs by nebulization in the morning and 2 mLs in the evening.   Yes ProviderDarrell MD   OXYGEN Inhale 5 L/min into the lungs continuous   Yes ProviderDarrell MD   formoterol (PERFOROMIST) 20 MCG/2ML nebulizer solution Take 2 mLs by nebulization 2 times daily 22  Yes Dea Cason, APRN - NP   oxyCODONE-acetaminophen  well.  Would also utilize low-dose spironolactone.  Continue to monitor renal function and serum potassium as well as blood pressure.    I have spent more than 55 minutes face to face with Jason L Kenny,and reviewing notes and laboratory data with greater than 50% of this time instructing and counseling the patient regarding my findings and recommendations and I have answered all questions as posed to me by Mr. Cox. Thank you,Jim Jaimes DO and Juni Loyd MD for allowing me to consult in the care of this patient.    Tre Bravo,  DO, FACP, FACC, [facility]    NOTE:  This report was transcribed using voice recognition software.  Every effort was made to ensure accuracy; however, inadvertent computerized transcription errors may be present.

## 2024-10-10 NOTE — PROGRESS NOTES
[facility] Hospitalist Progress Note    Admitting Date and Time: 10/9/2024 11:02 AM  Admit Dx: Acute exacerbation of chronic obstructive pulmonary disease (COPD) (HCC) [J44.1]  Acute respiratory failure with hypoxia [J96.01]  Acute on chronic hypoxic respiratory failure [J96.21]    Subjective:  Patient is being followed for Acute exacerbation of chronic obstructive pulmonary disease (COPD) (HCC) [J44.1]  Acute respiratory failure with hypoxia [J96.01]  Acute on chronic hypoxic respiratory failure [J96.21]   Pt feels okay  Per RN: no additional concerns    ROS: denies fever, chills, cp, sob, n/v, HA unless otherwise noted above     budesonide  500 mcg Nebulization BID RT    bumetanide  1 mg Oral Once per day on Monday Wednesday Friday    arformoterol tartrate  15 mcg Nebulization BID RT    sodium chloride flush  5-40 mL IntraVENous 2 times per day    enoxaparin  40 mg SubCUTAneous Daily    methylPREDNISolone  40 mg IntraVENous Q12H    bumetanide  2 mg IntraVENous Once    azithromycin  500 mg Oral Q48H     ipratropium 0.5 mg-albuterol 2.5 mg, 1 Dose, Q6H PRN  sodium chloride flush, 5-40 mL, PRN  sodium chloride, , PRN  potassium chloride, 40 mEq, PRN   Or  potassium alternative oral replacement, 40 mEq, PRN   Or  potassium chloride, 10 mEq, PRN  magnesium sulfate, 2,000 mg, PRN  ondansetron, 4 mg, Q8H PRN   Or  ondansetron, 4 mg, Q6H PRN  polyethylene glycol, 17 g, Daily PRN  acetaminophen, 650 mg, Q6H PRN   Or  acetaminophen, 650 mg, Q6H PRN  perflutren lipid microspheres, 1.5 mL, ONCE PRN  oxyCODONE-acetaminophen, 1 tablet, BID PRN   And  oxyCODONE, 2.5 mg, BID PRN         Objective:  /70   Pulse 79   Temp 97.4 °F (36.3 °C) (Axillary)   Resp 20   Ht 1.854 m (6' 1\")   Wt 83.9 kg (185 lb)   SpO2 94%   BMI 24.41 kg/m²     General Appearance: alert and oriented to person, place and time and in NAD, sitting in bed  Skin: warm and dry  Head: normocephalic and atraumatic  Eyes: PERRL, EOMI,

## 2024-10-11 ENCOUNTER — APPOINTMENT (OUTPATIENT)
Age: 74
DRG: 189 | End: 2024-10-11
Attending: INTERNAL MEDICINE
Payer: MEDICARE

## 2024-10-11 LAB
ANION GAP SERPL CALCULATED.3IONS-SCNC: 11 MMOL/L (ref 7–16)
BASOPHILS # BLD: 0.01 K/UL (ref 0–0.2)
BASOPHILS NFR BLD: 0 % (ref 0–2)
BUN SERPL-MCNC: 18 MG/DL (ref 6–23)
CALCIUM SERPL-MCNC: 9.9 MG/DL (ref 8.6–10.2)
CHLORIDE SERPL-SCNC: 101 MMOL/L (ref 98–107)
CO2 SERPL-SCNC: 24 MMOL/L (ref 22–29)
CREAT SERPL-MCNC: 0.7 MG/DL (ref 0.7–1.2)
ECHO AO ASC DIAM: 4 CM
ECHO AO ASCENDING AORTA INDEX: 1.92 CM/M2
ECHO AO ROOT DIAM: 4 CM
ECHO AO ROOT INDEX: 1.92 CM/M2
ECHO AO SINUS VALSALVA DIAM: 3.8 CM
ECHO AO SINUS VALSALVA INDEX: 1.83 CM/M2
ECHO AR MAX VEL PISA: 4.2 M/S
ECHO AV AREA PEAK VELOCITY: 3.3 CM2
ECHO AV AREA VTI: 3.6 CM2
ECHO AV AREA/BSA PEAK VELOCITY: 1.6 CM2/M2
ECHO AV AREA/BSA VTI: 1.7 CM2/M2
ECHO AV CUSP MM: 2.5 CM
ECHO AV MEAN GRADIENT: 2 MMHG
ECHO AV MEAN VELOCITY: 0.7 M/S
ECHO AV PEAK GRADIENT: 4 MMHG
ECHO AV PEAK VELOCITY: 1.1 M/S
ECHO AV REGURGITANT PHT: 682.3 MILLISECOND
ECHO AV VELOCITY RATIO: 0.82
ECHO AV VTI: 17.3 CM
ECHO BSA: 2.08 M2
ECHO EST RA PRESSURE: 8 MMHG
ECHO LA DIAMETER INDEX: 1.54 CM/M2
ECHO LA DIAMETER: 3.2 CM
ECHO LA TO AORTIC ROOT RATIO: 0.8
ECHO LA VOL A-L A2C: 69 ML (ref 18–58)
ECHO LA VOL A-L A4C: 57 ML (ref 18–58)
ECHO LA VOL MOD A2C: 67 ML (ref 18–58)
ECHO LA VOL MOD A4C: 53 ML (ref 18–58)
ECHO LA VOLUME AREA LENGTH: 64 ML
ECHO LA VOLUME INDEX A-L A2C: 33 ML/M2 (ref 16–34)
ECHO LA VOLUME INDEX A-L A4C: 27 ML/M2 (ref 16–34)
ECHO LA VOLUME INDEX AREA LENGTH: 31 ML/M2 (ref 16–34)
ECHO LA VOLUME INDEX MOD A2C: 32 ML/M2 (ref 16–34)
ECHO LA VOLUME INDEX MOD A4C: 25 ML/M2 (ref 16–34)
ECHO LV E' LATERAL VELOCITY: 7 CM/S
ECHO LV E' SEPTAL VELOCITY: 5 CM/S
ECHO LV EDV A2C: 96 ML
ECHO LV EDV A4C: 91 ML
ECHO LV EDV BP: 93 ML (ref 67–155)
ECHO LV EDV INDEX A4C: 44 ML/M2
ECHO LV EDV INDEX BP: 45 ML/M2
ECHO LV EDV NDEX A2C: 46 ML/M2
ECHO LV EF PHYSICIAN: 57 %
ECHO LV EJECTION FRACTION A2C: 54 %
ECHO LV EJECTION FRACTION A4C: 62 %
ECHO LV EJECTION FRACTION BIPLANE: 58 % (ref 55–100)
ECHO LV ESV A2C: 44 ML
ECHO LV ESV A4C: 34 ML
ECHO LV ESV BP: 39 ML (ref 22–58)
ECHO LV ESV INDEX A2C: 21 ML/M2
ECHO LV ESV INDEX A4C: 16 ML/M2
ECHO LV ESV INDEX BP: 19 ML/M2
ECHO LV FRACTIONAL SHORTENING: 30 % (ref 28–44)
ECHO LV GLOBAL LONGITUDINAL STRAIN (GLS): -11.7 %
ECHO LV GLOBAL LONGITUDINAL STRAIN (GLS): -17.5 %
ECHO LV GLOBAL LONGITUDINAL STRAIN (GLS): -19.6 %
ECHO LV GLOBAL LONGITUDINAL STRAIN (GLS): -21.3 %
ECHO LV INTERNAL DIMENSION DIASTOLE INDEX: 2.21 CM/M2
ECHO LV INTERNAL DIMENSION DIASTOLIC: 4.6 CM (ref 4.2–5.9)
ECHO LV INTERNAL DIMENSION SYSTOLIC INDEX: 1.54 CM/M2
ECHO LV INTERNAL DIMENSION SYSTOLIC: 3.2 CM
ECHO LV ISOVOLUMETRIC RELAXATION TIME (IVRT): 87.7 MS
ECHO LV IVSD: 1.3 CM (ref 0.6–1)
ECHO LV IVSS: 1.5 CM
ECHO LV MASS 2D: 217.4 G (ref 88–224)
ECHO LV MASS INDEX 2D: 104.5 G/M2 (ref 49–115)
ECHO LV POSTERIOR WALL DIASTOLIC: 1.2 CM (ref 0.6–1)
ECHO LV POSTERIOR WALL SYSTOLIC: 1.5 CM
ECHO LV RELATIVE WALL THICKNESS RATIO: 0.52
ECHO LVOT AREA: 4.2 CM2
ECHO LVOT AV VTI INDEX: 0.89
ECHO LVOT DIAM: 2.3 CM
ECHO LVOT MEAN GRADIENT: 2 MMHG
ECHO LVOT PEAK GRADIENT: 3 MMHG
ECHO LVOT PEAK VELOCITY: 0.9 M/S
ECHO LVOT STROKE VOLUME INDEX: 30.7 ML/M2
ECHO LVOT SV: 64 ML
ECHO LVOT VTI: 15.4 CM
ECHO MV "A" WAVE DURATION: 115.3 MSEC
ECHO MV A VELOCITY: 0.68 M/S
ECHO MV AREA PHT: 3.6 CM2
ECHO MV AREA VTI: 3.9 CM2
ECHO MV E DECELERATION TIME (DT): 224.7 MS
ECHO MV E VELOCITY: 0.37 M/S
ECHO MV E/A RATIO: 0.54
ECHO MV E/E' LATERAL: 5.29
ECHO MV E/E' RATIO (AVERAGED): 6.34
ECHO MV E/E' SEPTAL: 7.4
ECHO MV LVOT VTI INDEX: 1.07
ECHO MV MAX VELOCITY: 0.8 M/S
ECHO MV MEAN GRADIENT: 1 MMHG
ECHO MV MEAN VELOCITY: 0.5 M/S
ECHO MV PEAK GRADIENT: 3 MMHG
ECHO MV PRESSURE HALF TIME (PHT): 61.6 MS
ECHO MV VTI: 16.5 CM
ECHO PV MAX VELOCITY: 0.7 M/S
ECHO PV MEAN GRADIENT: 1 MMHG
ECHO PV MEAN VELOCITY: 0.4 M/S
ECHO PV PEAK GRADIENT: 2 MMHG
ECHO PV VTI: 10.7 CM
ECHO RV INTERNAL DIMENSION: 3.7 CM
ECHO RV TAPSE: 2 CM (ref 1.7–?)
EOSINOPHIL # BLD: 0 K/UL (ref 0.05–0.5)
EOSINOPHILS RELATIVE PERCENT: 0 % (ref 0–6)
ERYTHROCYTE [DISTWIDTH] IN BLOOD BY AUTOMATED COUNT: 14.9 % (ref 11.5–15)
GFR, ESTIMATED: >90 ML/MIN/1.73M2
GLUCOSE SERPL-MCNC: 124 MG/DL (ref 74–99)
HCT VFR BLD AUTO: 46.2 % (ref 37–54)
HGB BLD-MCNC: 15.2 G/DL (ref 12.5–16.5)
IMM GRANULOCYTES # BLD AUTO: 0.05 K/UL (ref 0–0.58)
IMM GRANULOCYTES NFR BLD: 1 % (ref 0–5)
LYMPHOCYTES NFR BLD: 0.35 K/UL (ref 1.5–4)
LYMPHOCYTES RELATIVE PERCENT: 4 % (ref 20–42)
MCH RBC QN AUTO: 31 PG (ref 26–35)
MCHC RBC AUTO-ENTMCNC: 32.9 G/DL (ref 32–34.5)
MCV RBC AUTO: 94.3 FL (ref 80–99.9)
MONOCYTES NFR BLD: 0.24 K/UL (ref 0.1–0.95)
MONOCYTES NFR BLD: 3 % (ref 2–12)
NEUTROPHILS NFR BLD: 93 % (ref 43–80)
NEUTS SEG NFR BLD: 8.9 K/UL (ref 1.8–7.3)
PLATELET # BLD AUTO: 187 K/UL (ref 130–450)
PMV BLD AUTO: 11.1 FL (ref 7–12)
POTASSIUM SERPL-SCNC: 4.5 MMOL/L (ref 3.5–5)
RBC # BLD AUTO: 4.9 M/UL (ref 3.8–5.8)
RBC # BLD: NORMAL 10*6/UL
SODIUM SERPL-SCNC: 136 MMOL/L (ref 132–146)
WBC OTHER # BLD: 9.6 K/UL (ref 4.5–11.5)

## 2024-10-11 PROCEDURE — 80048 BASIC METABOLIC PNL TOTAL CA: CPT

## 2024-10-11 PROCEDURE — 2700000000 HC OXYGEN THERAPY PER DAY

## 2024-10-11 PROCEDURE — 6370000000 HC RX 637 (ALT 250 FOR IP): Performed by: INTERNAL MEDICINE

## 2024-10-11 PROCEDURE — 93306 TTE W/DOPPLER COMPLETE: CPT

## 2024-10-11 PROCEDURE — 85025 COMPLETE CBC W/AUTO DIFF WBC: CPT

## 2024-10-11 PROCEDURE — 2060000000 HC ICU INTERMEDIATE R&B

## 2024-10-11 PROCEDURE — 94669 MECHANICAL CHEST WALL OSCILL: CPT

## 2024-10-11 PROCEDURE — 94640 AIRWAY INHALATION TREATMENT: CPT

## 2024-10-11 PROCEDURE — 6360000002 HC RX W HCPCS: Performed by: INTERNAL MEDICINE

## 2024-10-11 PROCEDURE — 2580000003 HC RX 258: Performed by: INTERNAL MEDICINE

## 2024-10-11 PROCEDURE — 99232 SBSQ HOSP IP/OBS MODERATE 35: CPT | Performed by: STUDENT IN AN ORGANIZED HEALTH CARE EDUCATION/TRAINING PROGRAM

## 2024-10-11 RX ADMIN — ARFORMOTEROL TARTRATE 15 MCG: 15 SOLUTION RESPIRATORY (INHALATION) at 20:00

## 2024-10-11 RX ADMIN — BUMETANIDE 1 MG: 1 TABLET ORAL at 22:34

## 2024-10-11 RX ADMIN — Medication 4 ML: at 20:00

## 2024-10-11 RX ADMIN — ARFORMOTEROL TARTRATE 15 MCG: 15 SOLUTION RESPIRATORY (INHALATION) at 08:38

## 2024-10-11 RX ADMIN — SODIUM CHLORIDE, PRESERVATIVE FREE 10 ML: 5 INJECTION INTRAVENOUS at 22:35

## 2024-10-11 RX ADMIN — METHYLPREDNISOLONE SODIUM SUCCINATE 40 MG: 40 INJECTION, POWDER, LYOPHILIZED, FOR SOLUTION INTRAMUSCULAR; INTRAVENOUS at 22:34

## 2024-10-11 RX ADMIN — METHYLPREDNISOLONE SODIUM SUCCINATE 40 MG: 40 INJECTION, POWDER, LYOPHILIZED, FOR SOLUTION INTRAMUSCULAR; INTRAVENOUS at 14:07

## 2024-10-11 RX ADMIN — BUDESONIDE 500 MCG: 0.5 SUSPENSION RESPIRATORY (INHALATION) at 20:00

## 2024-10-11 RX ADMIN — SODIUM CHLORIDE, PRESERVATIVE FREE 10 ML: 5 INJECTION INTRAVENOUS at 14:07

## 2024-10-11 RX ADMIN — ENOXAPARIN SODIUM 40 MG: 100 INJECTION SUBCUTANEOUS at 22:34

## 2024-10-11 RX ADMIN — AZITHROMYCIN 500 MG: 250 TABLET, FILM COATED ORAL at 18:22

## 2024-10-11 RX ADMIN — BUDESONIDE 500 MCG: 0.5 SUSPENSION RESPIRATORY (INHALATION) at 08:38

## 2024-10-11 RX ADMIN — Medication 4 ML: at 08:38

## 2024-10-11 NOTE — PROGRESS NOTES
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      Vitals:  VITALS:  /74   Pulse 80   Temp 98.1 °F (36.7 °C) (Oral)   Resp 18   Ht 1.854 m (6' 1\")   Wt 83.9 kg (185 lb)   SpO2 96%   BMI 24.41 kg/m²     24HR INTAKE/OUTPUT:      Intake/Output Summary (Last 24 hours) at 10/11/2024 1516  Last data filed at 10/11/2024 1341  Gross per 24 hour   Intake --   Output 2025 ml   Net -2025 ml       24HR PULSE OXIMETRY RANGE:    SpO2  Av.8 %  Min: 96 %  Max: 97 %    Medications:  IV:   sodium chloride         Scheduled Meds:   sodium chloride (Inhalant)  4 mL Nebulization BID    budesonide  500 mcg Nebulization BID RT    bumetanide  1 mg Oral Once per day on     arformoterol tartrate  15 mcg Nebulization BID RT    sodium chloride flush  5-40 mL IntraVENous 2 times per day    enoxaparin  40 mg SubCUTAneous Daily    methylPREDNISolone  40 mg IntraVENous Q12H    bumetanide  2 mg IntraVENous Once    azithromycin  500 mg Oral Q48H       Diet:   ADULT DIET; Regular     EXAM:  General: No distress. Alert.  Eyes: PERRL. No sclera icterus. No conjunctival injection.  ENT: No discharge. Pharynx clear.  Neck: Trachea midline. Normal

## 2024-10-11 NOTE — PLAN OF CARE
Problem: Discharge Planning  Goal: Discharge to home or other facility with appropriate resources  Outcome: Progressing  Flowsheets (Taken 10/11/2024 0800)  Discharge to home or other facility with appropriate resources: Identify barriers to discharge with patient and caregiver     Problem: Safety - Adult  Goal: Free from fall injury  Outcome: Progressing  Flowsheets (Taken 10/11/2024 0800)  Free From Fall Injury: Instruct family/caregiver on patient safety     Problem: Pain  Goal: Verbalizes/displays adequate comfort level or baseline comfort level  Outcome: Progressing  Flowsheets (Taken 10/11/2024 0719)  Verbalizes/displays adequate comfort level or baseline comfort level:   Encourage patient to monitor pain and request assistance   Assess pain using appropriate pain scale   Administer analgesics based on type and severity of pain and evaluate response   Implement non-pharmacological measures as appropriate and evaluate response   Consider cultural and social influences on pain and pain management   Notify Licensed Independent Practitioner if interventions unsuccessful or patient reports new pain

## 2024-10-11 NOTE — CARE COORDINATION
Cardiology is following ECHO results pending. Pulmonology is following. Plan for bronchoscopy, chest vest treatments. Currently on 10L HFNC, baseline is 6-7L O2 nc home supplier is [employer]. Currently on nebs, IV mag, IV potassium, IV solumedrol, PO bumex, PO zithromax. Anticipated to discharge home with no needs. Will continue to follow.  Charito SUAREZN, RN  Case Management

## 2024-10-11 NOTE — PROGRESS NOTES
[facility] Hospitalist Progress Note    Admitting Date and Time: 10/9/2024 11:02 AM  Admit Dx: Acute exacerbation of chronic obstructive pulmonary disease (COPD) (HCC) [J44.1]  Acute respiratory failure with hypoxia [J96.01]  Acute on chronic hypoxic respiratory failure [J96.21]    Subjective:  Patient is being followed for Acute exacerbation of chronic obstructive pulmonary disease (COPD) (HCC) [J44.1]  Acute respiratory failure with hypoxia [J96.01]  Acute on chronic hypoxic respiratory failure [J96.21]   Pt feels okay  Per RN: no additional concerns    ROS: denies fever, chills, cp, sob, n/v, HA unless otherwise noted above     sodium chloride (Inhalant)  4 mL Nebulization BID    budesonide  500 mcg Nebulization BID RT    bumetanide  1 mg Oral Once per day on Monday Wednesday Friday    arformoterol tartrate  15 mcg Nebulization BID RT    sodium chloride flush  5-40 mL IntraVENous 2 times per day    enoxaparin  40 mg SubCUTAneous Daily    methylPREDNISolone  40 mg IntraVENous Q12H    bumetanide  2 mg IntraVENous Once    azithromycin  500 mg Oral Q48H     perflutren lipid microspheres, 1.5 mL, ONCE PRN  ipratropium 0.5 mg-albuterol 2.5 mg, 1 Dose, Q6H PRN  sodium chloride flush, 5-40 mL, PRN  sodium chloride, , PRN  potassium chloride, 40 mEq, PRN   Or  potassium alternative oral replacement, 40 mEq, PRN   Or  potassium chloride, 10 mEq, PRN  magnesium sulfate, 2,000 mg, PRN  ondansetron, 4 mg, Q8H PRN   Or  ondansetron, 4 mg, Q6H PRN  polyethylene glycol, 17 g, Daily PRN  acetaminophen, 650 mg, Q6H PRN   Or  acetaminophen, 650 mg, Q6H PRN  perflutren lipid microspheres, 1.5 mL, ONCE PRN  oxyCODONE-acetaminophen, 1 tablet, BID PRN   And  oxyCODONE, 2.5 mg, BID PRN         Objective:  /82   Pulse 65   Temp 97.7 °F (36.5 °C) (Oral)   Resp 18   Ht 1.854 m (6' 1\")   Wt 83.9 kg (185 lb)   SpO2 97%   BMI 24.41 kg/m²     General Appearance: alert and oriented to person, place and time and in NAD,  bumex  DVT prophylaxis - Lovenox    -weaned to baseline 7L NC, tolerating, cont to wean steroids  -anticipate bronchoscopy today, per Pulm    Dispo: home today vs tomorrow    NOTE: Portions of this report may have been transcribed using voice recognition software. Every effort was made to ensure accuracy; however, inadvertent computerized transcription errors may be present.  Electronically signed by Glenys Christine MD on 10/11/2024 at 7:56 AM

## 2024-10-12 LAB
ANION GAP SERPL CALCULATED.3IONS-SCNC: 11 MMOL/L (ref 7–16)
BASOPHILS # BLD: 0 K/UL (ref 0–0.2)
BASOPHILS NFR BLD: 0 % (ref 0–2)
BUN SERPL-MCNC: 15 MG/DL (ref 6–23)
CALCIUM SERPL-MCNC: 9.8 MG/DL (ref 8.6–10.2)
CHLORIDE SERPL-SCNC: 100 MMOL/L (ref 98–107)
CO2 SERPL-SCNC: 25 MMOL/L (ref 22–29)
CREAT SERPL-MCNC: 0.6 MG/DL (ref 0.7–1.2)
EOSINOPHIL # BLD: 0 K/UL (ref 0.05–0.5)
EOSINOPHILS RELATIVE PERCENT: 0 % (ref 0–6)
ERYTHROCYTE [DISTWIDTH] IN BLOOD BY AUTOMATED COUNT: 14.6 % (ref 11.5–15)
GFR, ESTIMATED: >90 ML/MIN/1.73M2
GLUCOSE SERPL-MCNC: 123 MG/DL (ref 74–99)
HCT VFR BLD AUTO: 48.8 % (ref 37–54)
HGB BLD-MCNC: 16.3 G/DL (ref 12.5–16.5)
LYMPHOCYTES NFR BLD: 0.3 K/UL (ref 1.5–4)
LYMPHOCYTES RELATIVE PERCENT: 4 % (ref 20–42)
MCH RBC QN AUTO: 30.7 PG (ref 26–35)
MCHC RBC AUTO-ENTMCNC: 33.4 G/DL (ref 32–34.5)
MCV RBC AUTO: 91.9 FL (ref 80–99.9)
MONOCYTES NFR BLD: 0.07 K/UL (ref 0.1–0.95)
MONOCYTES NFR BLD: 1 % (ref 2–12)
NEUTROPHILS NFR BLD: 96 % (ref 43–80)
NEUTS SEG NFR BLD: 8.13 K/UL (ref 1.8–7.3)
PLATELET # BLD AUTO: 228 K/UL (ref 130–450)
PMV BLD AUTO: 11 FL (ref 7–12)
POTASSIUM SERPL-SCNC: 4.4 MMOL/L (ref 3.5–5)
RBC # BLD AUTO: 5.31 M/UL (ref 3.8–5.8)
RBC # BLD: ABNORMAL 10*6/UL
SODIUM SERPL-SCNC: 136 MMOL/L (ref 132–146)
WBC OTHER # BLD: 8.5 K/UL (ref 4.5–11.5)

## 2024-10-12 PROCEDURE — 99232 SBSQ HOSP IP/OBS MODERATE 35: CPT | Performed by: STUDENT IN AN ORGANIZED HEALTH CARE EDUCATION/TRAINING PROGRAM

## 2024-10-12 PROCEDURE — 6370000000 HC RX 637 (ALT 250 FOR IP): Performed by: INTERNAL MEDICINE

## 2024-10-12 PROCEDURE — 85025 COMPLETE CBC W/AUTO DIFF WBC: CPT

## 2024-10-12 PROCEDURE — 94669 MECHANICAL CHEST WALL OSCILL: CPT

## 2024-10-12 PROCEDURE — 2700000000 HC OXYGEN THERAPY PER DAY

## 2024-10-12 PROCEDURE — 2580000003 HC RX 258: Performed by: INTERNAL MEDICINE

## 2024-10-12 PROCEDURE — 6360000002 HC RX W HCPCS: Performed by: INTERNAL MEDICINE

## 2024-10-12 PROCEDURE — 94640 AIRWAY INHALATION TREATMENT: CPT

## 2024-10-12 PROCEDURE — 2060000000 HC ICU INTERMEDIATE R&B

## 2024-10-12 PROCEDURE — 80048 BASIC METABOLIC PNL TOTAL CA: CPT

## 2024-10-12 RX ORDER — FLUTICASONE PROPIONATE 50 MCG
1 SPRAY, SUSPENSION (ML) NASAL DAILY
Status: DISCONTINUED | OUTPATIENT
Start: 2024-10-12 | End: 2024-10-15 | Stop reason: HOSPADM

## 2024-10-12 RX ADMIN — SODIUM CHLORIDE, PRESERVATIVE FREE 10 ML: 5 INJECTION INTRAVENOUS at 20:38

## 2024-10-12 RX ADMIN — Medication 4 ML: at 08:30

## 2024-10-12 RX ADMIN — METHYLPREDNISOLONE SODIUM SUCCINATE 40 MG: 40 INJECTION, POWDER, LYOPHILIZED, FOR SOLUTION INTRAMUSCULAR; INTRAVENOUS at 11:08

## 2024-10-12 RX ADMIN — SODIUM CHLORIDE, PRESERVATIVE FREE 10 ML: 5 INJECTION INTRAVENOUS at 11:09

## 2024-10-12 RX ADMIN — BUDESONIDE 500 MCG: 0.5 SUSPENSION RESPIRATORY (INHALATION) at 19:20

## 2024-10-12 RX ADMIN — IPRATROPIUM BROMIDE AND ALBUTEROL SULFATE 1 DOSE: 2.5; .5 SOLUTION RESPIRATORY (INHALATION) at 19:20

## 2024-10-12 RX ADMIN — FLUTICASONE PROPIONATE 1 SPRAY: 50 SPRAY, METERED NASAL at 14:53

## 2024-10-12 RX ADMIN — ENOXAPARIN SODIUM 40 MG: 100 INJECTION SUBCUTANEOUS at 20:38

## 2024-10-12 RX ADMIN — METHYLPREDNISOLONE SODIUM SUCCINATE 40 MG: 40 INJECTION, POWDER, LYOPHILIZED, FOR SOLUTION INTRAMUSCULAR; INTRAVENOUS at 23:23

## 2024-10-12 RX ADMIN — Medication 4 ML: at 19:20

## 2024-10-12 RX ADMIN — BUDESONIDE 500 MCG: 0.5 SUSPENSION RESPIRATORY (INHALATION) at 08:30

## 2024-10-12 RX ADMIN — SALINE NASAL SPRAY 1 SPRAY: 1.5 SOLUTION NASAL at 14:53

## 2024-10-12 RX ADMIN — ARFORMOTEROL TARTRATE 15 MCG: 15 SOLUTION RESPIRATORY (INHALATION) at 08:30

## 2024-10-12 RX ADMIN — ARFORMOTEROL TARTRATE 15 MCG: 15 SOLUTION RESPIRATORY (INHALATION) at 19:20

## 2024-10-12 NOTE — PROGRESS NOTES
10/12/24  0315   WBC 8.0 9.6 8.5   HGB 15.5 15.2 16.3   HCT 48.0 46.2 48.8    187 228     BMP:  Recent Labs     10/10/24  0721 10/11/24  0256 10/12/24  0315    136 136   K 4.2 4.5 4.4   CL 99 101 100   CO2 22 24 25   BUN 15 18 15   CREATININE 0.7 0.7 0.6*   LABGLOM >90 >90 >90     ABGs:   Lab Results   Component Value Date/Time    PH 7.436 04/28/2024 12:17 PM    PO2 55.0 04/28/2024 12:17 PM    PCO2 31.5 04/28/2024 12:17 PM     INR: No results for input(s): \"INR\" in the last 72 hours.  PRO-BNP:   Lab Results   Component Value Date    PROBNP 1,433 (H) 10/09/2024    PROBNP 3,571 (H) 04/28/2024      TSH:   Lab Results   Component Value Date    TSH 0.36 08/08/2023      Cardiac Injury Profile: No results for input(s): \"TROPHS\" in the last 72 hours.   Lipid Profile:   Lab Results   Component Value Date/Time    TRIG 42 08/08/2023 04:30 AM    HDL 64 08/08/2023 04:30 AM    CHOL 119 08/08/2023 04:30 AM    CHOL 146 06/12/2023 02:00 AM      Hemoglobin A1C: No components found for: \"HGBA1C\"      RAD:   No results found.      EKG: See Report  Echo: See Report      IMPRESSIONS:  Patient Active Problem List   Diagnosis Code    COPD (chronic obstructive pulmonary disease) ([facility]) J44.9    Thoracic ascending aortic aneurysm I71.21    S/P lumbar fusion Z98.1    Hilar adenopathy R59.0    Pulmonary Mycobacterium avium complex (MAC) infection ([facility]) A31.0    Chronic respiratory failure with hypoxia J96.11    Chronic pain syndrome G89.4    Lumbar degenerative disc disease M51.369    Chronic low back pain M54.50, G89.29    Immunocompromised ([facility]) D84.9    Iron deficiency E61.1    Pulmonary hypertension ([facility]) I27.20    Chronic diastolic heart failure ([facility]) I50.32    Acute on chronic respiratory failure with hypoxia J96.21    IgG deficiency ([facility]) D80.3    Acute on chronic hypoxic respiratory failure J96.21       RECOMMENDATIONS:  ***    I have spent more than *** minutes face to face with Jason Cox and reviewing notes and  laboratory data, with greater than 50% of this time instructing and counseling the patient *** face to face regarding my findings and recommendations and I have answered all questions as posed to me by Mr. Cox.    Tre Bravo, DO FACP,FACC,[facility]      NOTE:  This report was transcribed using voice recognition software.  Every effort was made to ensure accuracy; however, inadvertent computerized transcription errors may be present

## 2024-10-12 NOTE — PROGRESS NOTES
[facility]  [address], [address]  [place] [address]      Pulmonary Progress Note      SUBJECTIVE:  on 7 li HFNC sitting at side of bed, claims overall slightly better with breathing, still with cough and min-mod sputum production, does complain of increased nasal congestion, has phenylephrine nasal spray at bedside but claims hasn't use for the past few days. Didn't get bronchoscopy yesterday, getting chest vest while here, (?) scheduled for tomorrow.    OBJECTIVE    Medications    Continuous Infusions:   sodium chloride         Scheduled Meds:   sodium chloride (Inhalant)  4 mL Nebulization BID    budesonide  500 mcg Nebulization BID RT    bumetanide  1 mg Oral Once per day on     arformoterol tartrate  15 mcg Nebulization BID RT    sodium chloride flush  5-40 mL IntraVENous 2 times per day    enoxaparin  40 mg SubCUTAneous Daily    methylPREDNISolone  40 mg IntraVENous Q12H    bumetanide  2 mg IntraVENous Once    azithromycin  500 mg Oral Q48H       PRN Meds:perflutren lipid microspheres, ipratropium 0.5 mg-albuterol 2.5 mg, sodium chloride flush, sodium chloride, potassium chloride **OR** potassium alternative oral replacement **OR** potassium chloride, magnesium sulfate, ondansetron **OR** ondansetron, polyethylene glycol, acetaminophen **OR** acetaminophen, perflutren lipid microspheres, oxyCODONE-acetaminophen **AND** oxyCODONE    Physical    VITALS:  /77   Pulse 95   Temp 97.3 °F (36.3 °C) (Oral)   Resp 20   Ht 1.854 m (6' 1\")   Wt 83.9 kg (185 lb)   SpO2 90%   BMI 24.41 kg/m²     24HR INTAKE/OUTPUT:      Intake/Output Summary (Last 24 hours) at 10/12/2024 1354  Last data filed at 10/12/2024 0709  Gross per 24 hour   Intake 120 ml   Output 2150 ml   Net -2030 ml       24HR PULSE OXIMETRY RANGE:    SpO2  Av.3 %  Min: 90 %  Max: 93 %    General appearance: alert, appears stated age, and

## 2024-10-12 NOTE — PROGRESS NOTES
[facility] Hospitalist Progress Note    Admitting Date and Time: 10/9/2024 11:02 AM  Admit Dx: Acute exacerbation of chronic obstructive pulmonary disease (COPD) (HCC) [J44.1]  Acute respiratory failure with hypoxia [J96.01]  Acute on chronic hypoxic respiratory failure [J96.21]    Subjective:  Patient is being followed for Acute exacerbation of chronic obstructive pulmonary disease (COPD) (HCC) [J44.1]  Acute respiratory failure with hypoxia [J96.01]  Acute on chronic hypoxic respiratory failure [J96.21]   Pt feels okay  Per RN: no additional concerns    ROS: denies fever, chills, cp, sob, n/v, HA unless otherwise noted above     sodium chloride (Inhalant)  4 mL Nebulization BID    budesonide  500 mcg Nebulization BID RT    bumetanide  1 mg Oral Once per day on Monday Wednesday Friday    arformoterol tartrate  15 mcg Nebulization BID RT    sodium chloride flush  5-40 mL IntraVENous 2 times per day    enoxaparin  40 mg SubCUTAneous Daily    methylPREDNISolone  40 mg IntraVENous Q12H    bumetanide  2 mg IntraVENous Once    azithromycin  500 mg Oral Q48H     perflutren lipid microspheres, 1.5 mL, ONCE PRN  ipratropium 0.5 mg-albuterol 2.5 mg, 1 Dose, Q6H PRN  sodium chloride flush, 5-40 mL, PRN  sodium chloride, , PRN  potassium chloride, 40 mEq, PRN   Or  potassium alternative oral replacement, 40 mEq, PRN   Or  potassium chloride, 10 mEq, PRN  magnesium sulfate, 2,000 mg, PRN  ondansetron, 4 mg, Q8H PRN   Or  ondansetron, 4 mg, Q6H PRN  polyethylene glycol, 17 g, Daily PRN  acetaminophen, 650 mg, Q6H PRN   Or  acetaminophen, 650 mg, Q6H PRN  perflutren lipid microspheres, 1.5 mL, ONCE PRN  oxyCODONE-acetaminophen, 1 tablet, BID PRN   And  oxyCODONE, 2.5 mg, BID PRN         Objective:  /84   Pulse 62   Temp 97.5 °F (36.4 °C) (Oral)   Resp 18   Ht 1.854 m (6' 1\")   Wt 83.9 kg (185 lb)   SpO2 90%   BMI 24.41 kg/m²     General Appearance: alert and oriented to person, place and time and in NAD,

## 2024-10-12 NOTE — PROGRESS NOTES
PROGRESS NOTE       PATIENT PROBLEM LIST:  Patient Active Problem List   Diagnosis Code    COPD (chronic obstructive pulmonary disease) ([facility]) J44.9    Thoracic ascending aortic aneurysm I71.21    S/P lumbar fusion Z98.1    Hilar adenopathy R59.0    Pulmonary Mycobacterium avium complex (MAC) infection ([facility]) A31.0    Chronic respiratory failure with hypoxia J96.11    Chronic pain syndrome G89.4    Lumbar degenerative disc disease M51.369    Chronic low back pain M54.50, G89.29    Immunocompromised ([facility]) D84.9    Iron deficiency E61.1    Pulmonary hypertension ([facility]) I27.20    Chronic diastolic heart failure ([facility]) I50.32    Acute on chronic respiratory failure with hypoxia J96.21    IgG deficiency ([facility]) D80.3    Acute on chronic hypoxic respiratory failure J96.21       SUBJECTIVE:  Jason Cox states he feels no significantly different than from admission and is still short of breath with minimal effort.  Two-dimensional echocardiogram completed and does not demonstrate evidence for severe pulmonary hypertension.    REVIEW OF SYSTEMS:  General ROS: negative for - fatigue, malaise,  weight gain or weight loss  Psychological ROS: negative for - anxiety , depression  Ophthalmic ROS: negative for - decreased vision or visual distortion.  ENT ROS: negative  Allergy and Immunology ROS: negative  Hematological and Lymphatic ROS: negative  Endocrine: no heat or cold intolerance and no polyphagia, polydipsia, or polyuria  Respiratory ROS: positive for - cough and shortness of breath  Cardiovascular ROS: positive for - dyspnea on exertion and irregular heartbeat.  Gastrointestinal ROS: no abdominal pain, change in bowel habits, or black or bloody stools  Genito-Urinary ROS: no nocturia, dysuria, trouble voiding, frequency or hematuria  Musculoskeletal ROS: negative for- myalgias, arthralgias, or claudication  Neurological ROS: no TIA or stroke symptoms otherwise no significant change in symptoms or problems since yesterday as    Tricuspid Valve Valve structure is normal. Physiologically normal regurgitation.   Pulmonic Valve Valve structure is normal. Physiologically normal regurgitation.   Aorta Normal sized sinus of Valsalva. Mildly dilated aortic root. Ao root diameter is 4.0 cm. Grade 1, calcified atherosclerosis of the sinus of Valsalva and ascending aorta.   IVC/Hepatic Veins IVC diameter is less than or equal to 21 mm and decreases greater than 50% during inspiration; therefore the estimated right atrial pressure is normal (~3 mmHg). IVC is dilated.   Pericardium Trivial pericardial effusion present. No indication of cardiac tamponade.       IMPRESSIONS:  Patient Active Problem List   Diagnosis Code    COPD (chronic obstructive pulmonary disease) ([facility]) J44.9    Thoracic ascending aortic aneurysm I71.21    S/P lumbar fusion Z98.1    Hilar adenopathy R59.0    Pulmonary Mycobacterium avium complex (MAC) infection ([facility]) A31.0    Chronic respiratory failure with hypoxia J96.11    Chronic pain syndrome G89.4    Lumbar degenerative disc disease M51.369    Chronic low back pain M54.50, G89.29    Immunocompromised ([facility]) D84.9    Iron deficiency E61.1    Pulmonary hypertension ([facility]) I27.20    Chronic diastolic heart failure ([facility]) I50.32    Acute on chronic respiratory failure with hypoxia J96.21    IgG deficiency ([facility]) D80.3    Acute on chronic hypoxic respiratory failure J96.21       RECOMMENDATIONS:  Continue aggressive pulmonary therapy as two-dimensional echocardiogram appears no different than 2022 without gross evidence for pulmonary hypertension nor left ventricular systolic/diastolic dysfunction and estimated wedge pressure of 10 mmHg as per [place] formula.  Will continue to observe for cardiac arrhythmia associated with his underlying advanced pulmonary disease.    I have spent more than 25 minutes face to face with Jason Cox and reviewing notes and laboratory data, with greater than 50% of this time instructing and counseling

## 2024-10-13 PROCEDURE — 6360000002 HC RX W HCPCS: Performed by: INTERNAL MEDICINE

## 2024-10-13 PROCEDURE — 2060000000 HC ICU INTERMEDIATE R&B

## 2024-10-13 PROCEDURE — 6370000000 HC RX 637 (ALT 250 FOR IP): Performed by: INTERNAL MEDICINE

## 2024-10-13 PROCEDURE — 6360000002 HC RX W HCPCS: Performed by: STUDENT IN AN ORGANIZED HEALTH CARE EDUCATION/TRAINING PROGRAM

## 2024-10-13 PROCEDURE — 94640 AIRWAY INHALATION TREATMENT: CPT

## 2024-10-13 PROCEDURE — 99232 SBSQ HOSP IP/OBS MODERATE 35: CPT | Performed by: STUDENT IN AN ORGANIZED HEALTH CARE EDUCATION/TRAINING PROGRAM

## 2024-10-13 PROCEDURE — 2580000003 HC RX 258: Performed by: INTERNAL MEDICINE

## 2024-10-13 PROCEDURE — 94668 MNPJ CHEST WALL SBSQ: CPT

## 2024-10-13 PROCEDURE — 2700000000 HC OXYGEN THERAPY PER DAY

## 2024-10-13 PROCEDURE — 94669 MECHANICAL CHEST WALL OSCILL: CPT

## 2024-10-13 RX ORDER — PREDNISONE 20 MG/1
40 TABLET ORAL DAILY
Status: DISCONTINUED | OUTPATIENT
Start: 2024-10-14 | End: 2024-10-15 | Stop reason: HOSPADM

## 2024-10-13 RX ADMIN — BUDESONIDE 500 MCG: 0.5 SUSPENSION RESPIRATORY (INHALATION) at 08:49

## 2024-10-13 RX ADMIN — SALINE NASAL SPRAY 1 SPRAY: 1.5 SOLUTION NASAL at 17:07

## 2024-10-13 RX ADMIN — ARFORMOTEROL TARTRATE 15 MCG: 15 SOLUTION RESPIRATORY (INHALATION) at 19:42

## 2024-10-13 RX ADMIN — Medication 4 ML: at 19:42

## 2024-10-13 RX ADMIN — BUDESONIDE 500 MCG: 0.5 SUSPENSION RESPIRATORY (INHALATION) at 19:42

## 2024-10-13 RX ADMIN — SODIUM CHLORIDE, PRESERVATIVE FREE 10 ML: 5 INJECTION INTRAVENOUS at 19:54

## 2024-10-13 RX ADMIN — FLUTICASONE PROPIONATE 1 SPRAY: 50 SPRAY, METERED NASAL at 08:37

## 2024-10-13 RX ADMIN — ARFORMOTEROL TARTRATE 15 MCG: 15 SOLUTION RESPIRATORY (INHALATION) at 08:49

## 2024-10-13 RX ADMIN — ENOXAPARIN SODIUM 40 MG: 100 INJECTION SUBCUTANEOUS at 19:54

## 2024-10-13 RX ADMIN — SALINE NASAL SPRAY 1 SPRAY: 1.5 SOLUTION NASAL at 08:37

## 2024-10-13 RX ADMIN — AZITHROMYCIN 500 MG: 250 TABLET, FILM COATED ORAL at 17:07

## 2024-10-13 RX ADMIN — Medication 4 ML: at 08:49

## 2024-10-13 RX ADMIN — METHYLPREDNISOLONE SODIUM SUCCINATE 40 MG: 40 INJECTION, POWDER, LYOPHILIZED, FOR SOLUTION INTRAMUSCULAR; INTRAVENOUS at 11:45

## 2024-10-13 RX ADMIN — IPRATROPIUM BROMIDE AND ALBUTEROL SULFATE 1 DOSE: 2.5; .5 SOLUTION RESPIRATORY (INHALATION) at 19:42

## 2024-10-13 NOTE — PROGRESS NOTES
[facility] Hospitalist Progress Note    Admitting Date and Time: 10/9/2024 11:02 AM  Admit Dx: Acute exacerbation of chronic obstructive pulmonary disease (COPD) (HCC) [J44.1]  Acute respiratory failure with hypoxia [J96.01]  Acute on chronic hypoxic respiratory failure [J96.21]    Subjective:  Patient is being followed for Acute exacerbation of chronic obstructive pulmonary disease (COPD) (HCC) [J44.1]  Acute respiratory failure with hypoxia [J96.01]  Acute on chronic hypoxic respiratory failure [J96.21]   Pt feels okay  Per RN: no additional concerns    ROS: denies fever, chills, cp, sob, n/v, HA unless otherwise noted above     sodium chloride  1 spray Each Nostril BID    fluticasone  1 spray Each Nostril Daily    sodium chloride (Inhalant)  4 mL Nebulization BID    budesonide  500 mcg Nebulization BID RT    bumetanide  1 mg Oral Once per day on Monday Wednesday Friday    arformoterol tartrate  15 mcg Nebulization BID RT    sodium chloride flush  5-40 mL IntraVENous 2 times per day    enoxaparin  40 mg SubCUTAneous Daily    methylPREDNISolone  40 mg IntraVENous Q12H    bumetanide  2 mg IntraVENous Once    azithromycin  500 mg Oral Q48H     perflutren lipid microspheres, 1.5 mL, ONCE PRN  ipratropium 0.5 mg-albuterol 2.5 mg, 1 Dose, Q6H PRN  sodium chloride flush, 5-40 mL, PRN  sodium chloride, , PRN  potassium chloride, 40 mEq, PRN   Or  potassium alternative oral replacement, 40 mEq, PRN   Or  potassium chloride, 10 mEq, PRN  magnesium sulfate, 2,000 mg, PRN  ondansetron, 4 mg, Q8H PRN   Or  ondansetron, 4 mg, Q6H PRN  polyethylene glycol, 17 g, Daily PRN  acetaminophen, 650 mg, Q6H PRN   Or  acetaminophen, 650 mg, Q6H PRN  perflutren lipid microspheres, 1.5 mL, ONCE PRN  oxyCODONE-acetaminophen, 1 tablet, BID PRN   And  oxyCODONE, 2.5 mg, BID PRN         Objective:  /74   Pulse 75   Temp 98.1 °F (36.7 °C) (Oral)   Resp 20   Ht 1.854 m (6' 1\")   Wt 83.9 kg (185 lb)   SpO2 97%   BMI 24.41  kg/m²     General Appearance: alert and oriented to person, place and time and in NAD, sitting in bed  Skin: warm and dry  Head: normocephalic and atraumatic  Eyes: PERRL, EOMI, conjunctivae normal  Neck: neck supple, trachea midline   Pulmonary/Chest: diminished lung sounds, diffuse wheezing, normal WOB, 7L HFNC  Cardiovascular: RRR, no definite murmurs  Abdomen: soft, non-tender, non-distended  Extremities: no cyanosis, no clubbing and no edema  Neurologic: no cranial nerve deficit and speech normal      Recent Labs     10/11/24  0256 10/12/24  0315    136   K 4.5 4.4    100   CO2 24 25   BUN 18 15   CREATININE 0.7 0.6*   GLUCOSE 124* 123*   CALCIUM 9.9 9.8       Recent Labs     10/11/24  0256 10/12/24  0315   WBC 9.6 8.5   RBC 4.90 5.31   HGB 15.2 16.3   HCT 46.2 48.8   MCV 94.3 91.9   MCH 31.0 30.7   MCHC 32.9 33.4   RDW 14.9 14.6    228   MPV 11.1 11.0       Radiology:  CTA PULMONARY W CONTRAST   Final Result   1. No acute pulmonary artery embolism.   2. Severe emphysema with central cylindrical bronchiectasis.  COPD appearance   without isolated nodule or mass   3. Pulmonary arterial hypertension.   4. Hiatal hernia.              Assessment:  Principal Problem:    Acute on chronic hypoxic respiratory failure  Resolved Problems:    * No resolved hospital problems. *      Plan:  Acute on chronic hypoxic respiratory failure - normally is on 5-7L NC. Currently on 10L HFNC. ProBNP is slightly elevated. Negative viral respiratory panel. No PE on CTA  COPD exacerbation - cont solumedrol, breathing treatments. Pulmonology has been consulted. Cont vest treatments  New EKG changes - New T wave inversions from V2-V4/III/aVF. No elevation in troponin. Will order ECHO, consult Cards  History of Mycobacterium avium complex - Should be on azithromycin 500 mg po 3 times a week  IgG deficiency  Chronic pain syndrome - Continue home percocet  Chronic HFpEF - Stable on bumex    -remains stable on baseline 7L

## 2024-10-14 ENCOUNTER — ANESTHESIA (OUTPATIENT)
Dept: ENDOSCOPY | Age: 74
DRG: 189 | End: 2024-10-14
Payer: MEDICARE

## 2024-10-14 ENCOUNTER — ANESTHESIA EVENT (OUTPATIENT)
Dept: ENDOSCOPY | Age: 74
DRG: 189 | End: 2024-10-14
Payer: MEDICARE

## 2024-10-14 PROCEDURE — 2709999900 HC NON-CHARGEABLE SUPPLY: Performed by: INTERNAL MEDICINE

## 2024-10-14 PROCEDURE — 99232 SBSQ HOSP IP/OBS MODERATE 35: CPT | Performed by: STUDENT IN AN ORGANIZED HEALTH CARE EDUCATION/TRAINING PROGRAM

## 2024-10-14 PROCEDURE — 6360000002 HC RX W HCPCS: Performed by: INTERNAL MEDICINE

## 2024-10-14 PROCEDURE — 87070 CULTURE OTHR SPECIMN AEROBIC: CPT

## 2024-10-14 PROCEDURE — 6370000000 HC RX 637 (ALT 250 FOR IP): Performed by: INTERNAL MEDICINE

## 2024-10-14 PROCEDURE — 94668 MNPJ CHEST WALL SBSQ: CPT

## 2024-10-14 PROCEDURE — 87116 MYCOBACTERIA CULTURE: CPT

## 2024-10-14 PROCEDURE — 0BCJ8ZZ EXTIRPATION OF MATTER FROM LEFT LOWER LUNG LOBE, VIA NATURAL OR ARTIFICIAL OPENING ENDOSCOPIC: ICD-10-PCS | Performed by: INTERNAL MEDICINE

## 2024-10-14 PROCEDURE — 3700000000 HC ANESTHESIA ATTENDED CARE: Performed by: INTERNAL MEDICINE

## 2024-10-14 PROCEDURE — 87206 SMEAR FLUORESCENT/ACID STAI: CPT

## 2024-10-14 PROCEDURE — 2580000003 HC RX 258: Performed by: INTERNAL MEDICINE

## 2024-10-14 PROCEDURE — 2580000003 HC RX 258: Performed by: NURSE ANESTHETIST, CERTIFIED REGISTERED

## 2024-10-14 PROCEDURE — 87077 CULTURE AEROBIC IDENTIFY: CPT

## 2024-10-14 PROCEDURE — 87102 FUNGUS ISOLATION CULTURE: CPT

## 2024-10-14 PROCEDURE — 3700000001 HC ADD 15 MINUTES (ANESTHESIA): Performed by: INTERNAL MEDICINE

## 2024-10-14 PROCEDURE — 6370000000 HC RX 637 (ALT 250 FOR IP): Performed by: STUDENT IN AN ORGANIZED HEALTH CARE EDUCATION/TRAINING PROGRAM

## 2024-10-14 PROCEDURE — 7100000010 HC PHASE II RECOVERY - FIRST 15 MIN: Performed by: INTERNAL MEDICINE

## 2024-10-14 PROCEDURE — 2500000003 HC RX 250 WO HCPCS: Performed by: INTERNAL MEDICINE

## 2024-10-14 PROCEDURE — 3609027000 HC BRONCHOSCOPY: Performed by: INTERNAL MEDICINE

## 2024-10-14 PROCEDURE — 6360000002 HC RX W HCPCS: Performed by: NURSE ANESTHETIST, CERTIFIED REGISTERED

## 2024-10-14 PROCEDURE — 7100000011 HC PHASE II RECOVERY - ADDTL 15 MIN: Performed by: INTERNAL MEDICINE

## 2024-10-14 PROCEDURE — 2060000000 HC ICU INTERMEDIATE R&B

## 2024-10-14 PROCEDURE — 2700000000 HC OXYGEN THERAPY PER DAY

## 2024-10-14 PROCEDURE — 2500000003 HC RX 250 WO HCPCS: Performed by: NURSE ANESTHETIST, CERTIFIED REGISTERED

## 2024-10-14 PROCEDURE — 0BJ08ZZ INSPECTION OF TRACHEOBRONCHIAL TREE, VIA NATURAL OR ARTIFICIAL OPENING ENDOSCOPIC: ICD-10-PCS | Performed by: INTERNAL MEDICINE

## 2024-10-14 PROCEDURE — 87205 SMEAR GRAM STAIN: CPT

## 2024-10-14 PROCEDURE — 0BCB8ZZ EXTIRPATION OF MATTER FROM LEFT LOWER LOBE BRONCHUS, VIA NATURAL OR ARTIFICIAL OPENING ENDOSCOPIC: ICD-10-PCS | Performed by: INTERNAL MEDICINE

## 2024-10-14 PROCEDURE — 94640 AIRWAY INHALATION TREATMENT: CPT

## 2024-10-14 PROCEDURE — 6370000000 HC RX 637 (ALT 250 FOR IP)

## 2024-10-14 PROCEDURE — 87015 SPECIMEN INFECT AGNT CONCNTJ: CPT

## 2024-10-14 RX ORDER — LIDOCAINE HYDROCHLORIDE 20 MG/ML
INJECTION, SOLUTION EPIDURAL; INFILTRATION; INTRACAUDAL; PERINEURAL PRN
Status: DISCONTINUED | OUTPATIENT
Start: 2024-10-14 | End: 2024-10-14 | Stop reason: ALTCHOICE

## 2024-10-14 RX ORDER — PROPOFOL 10 MG/ML
INJECTION, EMULSION INTRAVENOUS
Status: DISCONTINUED | OUTPATIENT
Start: 2024-10-14 | End: 2024-10-14 | Stop reason: SDUPTHER

## 2024-10-14 RX ORDER — LIDOCAINE HYDROCHLORIDE 20 MG/ML
INJECTION, SOLUTION INFILTRATION; PERINEURAL
Status: DISCONTINUED | OUTPATIENT
Start: 2024-10-14 | End: 2024-10-14 | Stop reason: SDUPTHER

## 2024-10-14 RX ORDER — SODIUM CHLORIDE 9 MG/ML
INJECTION, SOLUTION INTRAVENOUS
Status: DISCONTINUED | OUTPATIENT
Start: 2024-10-14 | End: 2024-10-14 | Stop reason: SDUPTHER

## 2024-10-14 RX ADMIN — ARFORMOTEROL TARTRATE 15 MCG: 15 SOLUTION RESPIRATORY (INHALATION) at 19:54

## 2024-10-14 RX ADMIN — SALINE NASAL SPRAY 1 SPRAY: 1.5 SOLUTION NASAL at 10:09

## 2024-10-14 RX ADMIN — SODIUM CHLORIDE, PRESERVATIVE FREE 10 ML: 5 INJECTION INTRAVENOUS at 21:58

## 2024-10-14 RX ADMIN — IPRATROPIUM BROMIDE AND ALBUTEROL SULFATE 1 DOSE: 2.5; .5 SOLUTION RESPIRATORY (INHALATION) at 14:13

## 2024-10-14 RX ADMIN — ACETAMINOPHEN 650 MG: 325 TABLET ORAL at 18:58

## 2024-10-14 RX ADMIN — Medication 4 ML: at 19:55

## 2024-10-14 RX ADMIN — BUDESONIDE 500 MCG: 0.5 SUSPENSION RESPIRATORY (INHALATION) at 07:40

## 2024-10-14 RX ADMIN — BUDESONIDE 500 MCG: 0.5 SUSPENSION RESPIRATORY (INHALATION) at 19:54

## 2024-10-14 RX ADMIN — BENZOCAINE AND MENTHOL 1 LOZENGE: 15; 3.6 LOZENGE ORAL at 22:05

## 2024-10-14 RX ADMIN — ENOXAPARIN SODIUM 40 MG: 100 INJECTION SUBCUTANEOUS at 21:57

## 2024-10-14 RX ADMIN — BUMETANIDE 1 MG: 1 TABLET ORAL at 21:57

## 2024-10-14 RX ADMIN — LIDOCAINE HYDROCHLORIDE 40 MG: 20 INJECTION, SOLUTION INFILTRATION; PERINEURAL at 13:31

## 2024-10-14 RX ADMIN — PREDNISONE 40 MG: 20 TABLET ORAL at 10:08

## 2024-10-14 RX ADMIN — Medication 4 ML: at 07:40

## 2024-10-14 RX ADMIN — FLUTICASONE PROPIONATE 1 SPRAY: 50 SPRAY, METERED NASAL at 10:09

## 2024-10-14 RX ADMIN — PROPOFOL 150 MG: 10 INJECTION, EMULSION INTRAVENOUS at 13:31

## 2024-10-14 RX ADMIN — ARFORMOTEROL TARTRATE 15 MCG: 15 SOLUTION RESPIRATORY (INHALATION) at 07:40

## 2024-10-14 RX ADMIN — SALINE NASAL SPRAY 1 SPRAY: 1.5 SOLUTION NASAL at 16:11

## 2024-10-14 RX ADMIN — SODIUM CHLORIDE: 9 INJECTION, SOLUTION INTRAVENOUS at 13:06

## 2024-10-14 RX ADMIN — SODIUM CHLORIDE, PRESERVATIVE FREE 10 ML: 5 INJECTION INTRAVENOUS at 10:09

## 2024-10-14 ASSESSMENT — PAIN DESCRIPTION - LOCATION: LOCATION: HEAD

## 2024-10-14 ASSESSMENT — PAIN - FUNCTIONAL ASSESSMENT
PAIN_FUNCTIONAL_ASSESSMENT: 0-10
PAIN_FUNCTIONAL_ASSESSMENT: 0-10

## 2024-10-14 ASSESSMENT — ENCOUNTER SYMPTOMS
SHORTNESS OF BREATH: 1
DYSPNEA ACTIVITY LEVEL: NO INTERVAL CHANGE

## 2024-10-14 ASSESSMENT — PAIN DESCRIPTION - ORIENTATION: ORIENTATION: RIGHT;LEFT

## 2024-10-14 ASSESSMENT — LIFESTYLE VARIABLES: SMOKING_STATUS: 0

## 2024-10-14 ASSESSMENT — PAIN DESCRIPTION - DESCRIPTORS: DESCRIPTORS: ACHING

## 2024-10-14 ASSESSMENT — COPD QUESTIONNAIRES: CAT_SEVERITY: NO INTERVAL CHANGE

## 2024-10-14 ASSESSMENT — PAIN SCALES - GENERAL: PAINLEVEL_OUTOF10: 8

## 2024-10-14 NOTE — PROGRESS NOTES
PROGRESS NOTE       PATIENT PROBLEM LIST:  Patient Active Problem List   Diagnosis Code    COPD (chronic obstructive pulmonary disease) ([facility]) J44.9    Thoracic ascending aortic aneurysm I71.21    S/P lumbar fusion Z98.1    Hilar adenopathy R59.0    Pulmonary Mycobacterium avium complex (MAC) infection ([facility]) A31.0    Chronic respiratory failure with hypoxia J96.11    Chronic pain syndrome G89.4    Lumbar degenerative disc disease M51.369    Chronic low back pain M54.50, G89.29    Immunocompromised ([facility]) D84.9    Iron deficiency E61.1    Pulmonary hypertension ([facility]) I27.20    Chronic diastolic heart failure ([facility]) I50.32    Acute on chronic respiratory failure with hypoxia J96.21    IgG deficiency ([facility]) D80.3    Acute on chronic hypoxic respiratory failure J96.21       SUBJECTIVE:  Jason Cox states he feels no significantly different than from admission and is still short of breath with minimal effort.  Two-dimensional echocardiogram completed and does not demonstrate evidence for severe pulmonary hypertension.    REVIEW OF SYSTEMS:  General ROS: negative for - fatigue, malaise,  weight gain or weight loss  Psychological ROS: negative for - anxiety , depression  Ophthalmic ROS: negative for - decreased vision or visual distortion.  ENT ROS: negative  Allergy and Immunology ROS: negative  Hematological and Lymphatic ROS: negative  Endocrine: no heat or cold intolerance and no polyphagia, polydipsia, or polyuria  Respiratory ROS: positive for - cough and shortness of breath  Cardiovascular ROS: positive for - dyspnea on exertion and irregular heartbeat.  Gastrointestinal ROS: no abdominal pain, change in bowel habits, or black or bloody stools  Genito-Urinary ROS: no nocturia, dysuria, trouble voiding, frequency or hematuria  Musculoskeletal ROS: negative for- myalgias, arthralgias, or claudication  Neurological ROS: no TIA or stroke symptoms otherwise no significant change in symptoms or problems since yesterday as  as posed to me by Mr. Cox.    Tre Bravo, DO FACP,FACC,[facility]      NOTE:  This report was transcribed using voice recognition software.  Every effort was made to ensure accuracy; however, inadvertent computerized transcription errors may be present

## 2024-10-14 NOTE — PROGRESS NOTES
[facility] Hospitalist Progress Note    Admitting Date and Time: 10/9/2024 11:02 AM  Admit Dx: Acute exacerbation of chronic obstructive pulmonary disease (COPD) (HCC) [J44.1]  Acute respiratory failure with hypoxia [J96.01]  Acute on chronic hypoxic respiratory failure [J96.21]    Subjective:  Patient is being followed for Acute exacerbation of chronic obstructive pulmonary disease (COPD) (HCC) [J44.1]  Acute respiratory failure with hypoxia [J96.01]  Acute on chronic hypoxic respiratory failure [J96.21]   Pt feels okay  Per RN: no additional concerns    ROS: denies fever, chills, cp, sob, n/v, HA unless otherwise noted above     predniSONE  40 mg Oral Daily    sodium chloride  1 spray Each Nostril BID    fluticasone  1 spray Each Nostril Daily    sodium chloride (Inhalant)  4 mL Nebulization BID    budesonide  500 mcg Nebulization BID RT    bumetanide  1 mg Oral Once per day on Monday Wednesday Friday    arformoterol tartrate  15 mcg Nebulization BID RT    sodium chloride flush  5-40 mL IntraVENous 2 times per day    enoxaparin  40 mg SubCUTAneous Daily    bumetanide  2 mg IntraVENous Once    azithromycin  500 mg Oral Q48H     perflutren lipid microspheres, 1.5 mL, ONCE PRN  ipratropium 0.5 mg-albuterol 2.5 mg, 1 Dose, Q6H PRN  sodium chloride flush, 5-40 mL, PRN  sodium chloride, , PRN  potassium chloride, 40 mEq, PRN   Or  potassium alternative oral replacement, 40 mEq, PRN   Or  potassium chloride, 10 mEq, PRN  magnesium sulfate, 2,000 mg, PRN  ondansetron, 4 mg, Q8H PRN   Or  ondansetron, 4 mg, Q6H PRN  polyethylene glycol, 17 g, Daily PRN  acetaminophen, 650 mg, Q6H PRN   Or  acetaminophen, 650 mg, Q6H PRN  perflutren lipid microspheres, 1.5 mL, ONCE PRN  oxyCODONE-acetaminophen, 1 tablet, BID PRN   And  oxyCODONE, 2.5 mg, BID PRN         Objective:  /85   Pulse 85   Temp 97.7 °F (36.5 °C) (Oral)   Resp 22   Ht 1.854 m (6' 1\")   Wt 83.9 kg (185 lb)   SpO2 92%   BMI 24.41 kg/m²

## 2024-10-14 NOTE — PLAN OF CARE
Problem: Discharge Planning  Goal: Discharge to home or other facility with appropriate resources  10/14/2024 1017 by Ariana Doran RN  Outcome: Progressing  10/13/2024 2139 by Mina Jeffries RN  Outcome: Progressing     Problem: Safety - Adult  Goal: Free from fall injury  10/14/2024 1017 by Ariana Doran RN  Outcome: Progressing  10/13/2024 2139 by Mina Jeffries RN  Outcome: Progressing     Problem: Pain  Goal: Verbalizes/displays adequate comfort level or baseline comfort level  10/14/2024 1017 by Ariana Doran RN  Outcome: Progressing  10/13/2024 2139 by Mina Jeffries RN  Outcome: Progressing

## 2024-10-14 NOTE — ANESTHESIA POSTPROCEDURE EVALUATION
Department of Anesthesiology  Postprocedure Note    Patient: Jason Cox  MRN: [id number]  YOB: 1950  Date of evaluation: 10/14/2024    Procedure Summary       Date: 10/14/24 Room / Location: [place] [id number] / [facility]    Anesthesia Start: 1319 Anesthesia Stop:     Procedure: BRONCHOSCOPY Diagnosis:       Pneumonia due to infectious organism, unspecified laterality, unspecified part of lung      (Pneumonia due to infectious organism, unspecified laterality, unspecified part of lung [J18.9])    Surgeons: Rolf Little MD Responsible Provider: Jaclyn Zamorano DO    Anesthesia Type: MAC ASA Status: 4            Anesthesia Type: No value filed.    Moon Phase I: Moon Score: 9    Moon Phase II:      Anesthesia Post Evaluation    Patient location during evaluation: PACU  Patient participation: waiting for patient participation  Level of consciousness: awake  Airway patency: patent  Nausea & Vomiting: no nausea and no vomiting  Cardiovascular status: hemodynamically stable  Respiratory status: acceptable  Hydration status: euvolemic  Pain management: adequate        No notable events documented.

## 2024-10-14 NOTE — OP NOTE
Operative Note      Patient: Jason Cox  YOB: 1950  MRN: [id number]    Date of Procedure: 10/14/2024    Pre-op diagnosis: Mucous plugging    Post-Op Diagnosis: Same       Procedure(s):  BRONCHOSCOPY    Surgeon(s):  Rolf Little MD    Assistant:   * No surgical staff found *    Anesthesia: Monitor Anesthesia Care    Estimated Blood Loss (mL): Minimal    Complications: None    Specimens:   ID Type Source Tests Collected by Time Destination   1 : LEFT LOWER LOBE LUNG WASH Respiratory Bronchial Washing CULTURE, FUNGUS, GRAM STAIN, CULTURE WITH SMEAR, ACID FAST BACILLIUS, CULTURE, RESPIRATORY Rolf Little MD 10/14/2024 1334        Implants:  * No implants in log *      Drains: * No LDAs found *    Findings:  Infection Present At Time Of Surgery (PATOS) (choose all levels that have infection present):  No infection present  Other Findings: Mucous plugging with purulent secretions in the left lower lobe    Detailed Description of Procedure:   The patient was informed procedure, consent was obtained.  A fiberoptic bronchoscopy was conducted of the lower airways once the vocal cords move promptly the midline and the upper airways were normal.    The trachea was intubated in the right left lungs were inspected.  Thick mucus was noted throughout the trachea and the right mainstem bronchus.  The remainder of the right lung appeared normal.  The bronchoscope was then withdrawn and placed into the left lung.  Left upper lobe and lingula.  Normal left lower lobe was full of purulent secretions occluding all airways.  These were aggressively suctioned.  Culture and sensitivity were sent of the secretions including AFB.    The patient tolerated the procedure well and was sent to recovery in stable condition.    Electronically signed by Rolf Little MD on 10/14/2024 at 2:25 PM

## 2024-10-14 NOTE — ANESTHESIA PRE PROCEDURE
Department of Anesthesiology  Preprocedure Note       Name:  Jason Cox   Age:  74 y.o.  :  1950                                          MRN:  [id number]         Date:  10/14/2024      Surgeon: Surgeon(s):  Rolf Little MD    Procedure: Procedure(s):  BRONCHOSCOPY    Medications prior to admission:   Prior to Admission medications    Medication Sig Start Date End Date Taking? Authorizing Provider   bumetanide (BUMEX) 1 MG tablet Take 1 tablet by mouth three times a week *MON-WED-FRI* 24 Yes Juni Loyd MD   fluticasone (FLONASE) 50 MCG/ACT nasal spray 1 spray by Each Nostril route daily 24  Yes Mayur Ly DO   ipratropium 0.5 mg-albuterol 2.5 mg (DUONEB) 0.5-2.5 (3) MG/3ML SOLN nebulizer solution Inhale 3 mLs into the lungs every 6 hours as needed for Shortness of Breath 24  Yes Alejandro Sargent MD   budesonide (PULMICORT) 0.5 MG/2ML nebulizer suspension Take 2 mLs by nebulization in the morning and 2 mLs in the evening.   Yes Darrell Marquez MD   OXYGEN Inhale 5 L/min into the lungs continuous   Yes Darrell Marquez MD   formoterol (PERFOROMIST) 20 MCG/2ML nebulizer solution Take 2 mLs by nebulization 2 times daily 22  Yes Dea Cason APRN - JOY   oxyCODONE-acetaminophen (PERCOCET) 7.5-325 MG per tablet Take 1 tablet by mouth 2 times daily as needed for Pain.   Yes Darrell Marquez MD       Current medications:    Current Facility-Administered Medications   Medication Dose Route Frequency Provider Last Rate Last Admin    predniSONE (DELTASONE) tablet 40 mg  40 mg Oral Daily Glenys Christine MD        sodium chloride (OCEAN, BABY AYR) 0.65 % nasal spray 1 spray  1 spray Each Nostril BID Alejandro Sargent MD   1 spray at 10/13/24 1707    fluticasone (FLONASE) 50 MCG/ACT nasal spray 1 spray  1 spray Each Nostril Daily Alejandro Sargent MD   1 spray at 10/13/24 0837    perflutren lipid microspheres (DEFINITY) injection 1.5 mL  1.5 mL IntraVENous ONCE

## 2024-10-15 VITALS
OXYGEN SATURATION: 88 % | HEIGHT: 73 IN | SYSTOLIC BLOOD PRESSURE: 103 MMHG | HEART RATE: 110 BPM | DIASTOLIC BLOOD PRESSURE: 72 MMHG | WEIGHT: 185 LBS | RESPIRATION RATE: 18 BRPM | BODY MASS INDEX: 24.52 KG/M2 | TEMPERATURE: 97.5 F

## 2024-10-15 PROCEDURE — 99239 HOSP IP/OBS DSCHRG MGMT >30: CPT | Performed by: STUDENT IN AN ORGANIZED HEALTH CARE EDUCATION/TRAINING PROGRAM

## 2024-10-15 PROCEDURE — 6360000002 HC RX W HCPCS: Performed by: INTERNAL MEDICINE

## 2024-10-15 PROCEDURE — 94669 MECHANICAL CHEST WALL OSCILL: CPT

## 2024-10-15 PROCEDURE — 2700000000 HC OXYGEN THERAPY PER DAY

## 2024-10-15 PROCEDURE — 6370000000 HC RX 637 (ALT 250 FOR IP): Performed by: INTERNAL MEDICINE

## 2024-10-15 PROCEDURE — 94640 AIRWAY INHALATION TREATMENT: CPT

## 2024-10-15 PROCEDURE — 6370000000 HC RX 637 (ALT 250 FOR IP): Performed by: STUDENT IN AN ORGANIZED HEALTH CARE EDUCATION/TRAINING PROGRAM

## 2024-10-15 PROCEDURE — 2580000003 HC RX 258: Performed by: INTERNAL MEDICINE

## 2024-10-15 RX ORDER — PREDNISONE 20 MG/1
40 TABLET ORAL DAILY
Qty: 20 TABLET | Refills: 0 | Status: SHIPPED | OUTPATIENT
Start: 2024-10-16 | End: 2024-10-26

## 2024-10-15 RX ORDER — AZITHROMYCIN 500 MG/1
500 TABLET, FILM COATED ORAL
Qty: 5 TABLET | Refills: 0 | Status: SHIPPED | OUTPATIENT
Start: 2024-10-15 | End: 2024-10-15

## 2024-10-15 RX ORDER — PREDNISONE 20 MG/1
40 TABLET ORAL DAILY
Qty: 20 TABLET | Refills: 0 | Status: SHIPPED | OUTPATIENT
Start: 2024-10-16 | End: 2024-10-15

## 2024-10-15 RX ORDER — AZITHROMYCIN 500 MG/1
500 TABLET, FILM COATED ORAL
Qty: 5 TABLET | Refills: 0 | Status: SHIPPED | OUTPATIENT
Start: 2024-10-15 | End: 2024-10-25

## 2024-10-15 RX ADMIN — FLUTICASONE PROPIONATE 1 SPRAY: 50 SPRAY, METERED NASAL at 09:33

## 2024-10-15 RX ADMIN — BUDESONIDE 500 MCG: 0.5 SUSPENSION RESPIRATORY (INHALATION) at 08:32

## 2024-10-15 RX ADMIN — PREDNISONE 40 MG: 20 TABLET ORAL at 09:33

## 2024-10-15 RX ADMIN — AZITHROMYCIN 500 MG: 250 TABLET, FILM COATED ORAL at 16:43

## 2024-10-15 RX ADMIN — SODIUM CHLORIDE, PRESERVATIVE FREE 10 ML: 5 INJECTION INTRAVENOUS at 10:07

## 2024-10-15 RX ADMIN — SALINE NASAL SPRAY 1 SPRAY: 1.5 SOLUTION NASAL at 09:33

## 2024-10-15 RX ADMIN — SALINE NASAL SPRAY 1 SPRAY: 1.5 SOLUTION NASAL at 16:42

## 2024-10-15 RX ADMIN — ARFORMOTEROL TARTRATE 15 MCG: 15 SOLUTION RESPIRATORY (INHALATION) at 08:32

## 2024-10-15 RX ADMIN — Medication 4 ML: at 08:32

## 2024-10-15 RX ADMIN — IPRATROPIUM BROMIDE AND ALBUTEROL SULFATE 1 DOSE: 2.5; .5 SOLUTION RESPIRATORY (INHALATION) at 08:32

## 2024-10-15 NOTE — CARE COORDINATION
Discharge order noted. Met with patient, discharge plan is home. Currently on 7L O2. Patient has [employer] in room d/t oxygen requirements family will bring portable O2 tank to transport home.   Charito SUAREZN, RN  Case Management

## 2024-10-15 NOTE — PROGRESS NOTES
Outpatient pharmacy spoke with patient he wants his meds sent to [facility], we called the floor the nurse is going to resend them to [place] [facility].

## 2024-10-15 NOTE — DISCHARGE SUMMARY
[facility] Hospitalist Physician Discharge Summary       No follow-up provider specified.    Activity level: as tolerated    Dispo: home      Condition on discharge: stable    Patient ID:  Jason Cox  [id number]  74 y.o.  1950    Admit date: 10/9/2024    Discharge date and time:  10/15/2024  2:26 PM    Admission Diagnoses: Principal Problem:    Acute on chronic hypoxic respiratory failure  Resolved Problems:    * No resolved hospital problems. *      Discharge Diagnoses: Principal Problem:    Acute on chronic hypoxic respiratory failure  Resolved Problems:    * No resolved hospital problems. *      Consults:  IP CONSULT TO CARDIOLOGY  IP CONSULT TO PULMONOLOGY    Procedures:   Bronch 10/14    Hospital Course:   Patient Jason Cox is a 74 y.o. presented with Acute exacerbation of chronic obstructive pulmonary disease (COPD) ([facility]) [J44.1]  Acute respiratory failure with hypoxia [J96.01]  Acute on chronic hypoxic respiratory failure [J96.21]    Brief summary:  Patient presented with acute on chronic resp failure and COPDe. Seen by Pulm, returned to baseline 7L NC requirement, underwent bronch with washings growing GPC/GPR and to complete course of augmentin and steroid taper as outpt, will need close f/u with Pulm for further medication adjustments. Seen by Cards for new EKG changes, ECHO unremarkable, no acute cardiac interventions this admit, will need f/u with Cards as outpt.    Patient otherwise remained stable during admission. Other issues addressed as below. Patient is being discharged home in stable condition.    **Most recent hospitalist plan**  Plan:  Acute on chronic hypoxic respiratory failure - normally is on 5-7L NC, admitted on 10L, now to baseline O2 reqs, acute failure resolved. ProBNP is slightly elevated. Negative viral respiratory panel. No PE on CTA. Pulm following, bronch 10/14 purulent mucus in right lung with washings growing GPC and GPR  COPD exacerbation - cont solumedrol,

## 2024-10-15 NOTE — PLAN OF CARE
Problem: Discharge Planning  Goal: Discharge to home or other facility with appropriate resources  Outcome: Progressing  Flowsheets (Taken 10/14/2024 2202)  Discharge to home or other facility with appropriate resources: Identify barriers to discharge with patient and caregiver     Problem: Safety - Adult  Goal: Free from fall injury  Outcome: Progressing     Problem: Pain  Goal: Verbalizes/displays adequate comfort level or baseline comfort level  Outcome: Progressing     Problem: Respiratory - Adult  Goal: Achieves optimal ventilation and oxygenation  Outcome: Progressing     Problem: Cardiovascular - Adult  Goal: Maintains optimal cardiac output and hemodynamic stability  Outcome: Progressing  Goal: Absence of cardiac dysrhythmias or at baseline  Outcome: Progressing     Problem: Musculoskeletal - Adult  Goal: Return mobility to safest level of function  Outcome: Progressing  Goal: Maintain proper alignment of affected body part  Outcome: Progressing     Problem: Discharge Planning  Goal: Discharge to home or other facility with appropriate resources  Outcome: Progressing  Flowsheets (Taken 10/14/2024 2202)  Discharge to home or other facility with appropriate resources: Identify barriers to discharge with patient and caregiver     Problem: Safety - Adult  Goal: Free from fall injury  Outcome: Progressing     Problem: Pain  Goal: Verbalizes/displays adequate comfort level or baseline comfort level  Outcome: Progressing     Problem: Respiratory - Adult  Goal: Achieves optimal ventilation and oxygenation  Outcome: Progressing     Problem: Cardiovascular - Adult  Goal: Maintains optimal cardiac output and hemodynamic stability  Outcome: Progressing     Problem: Cardiovascular - Adult  Goal: Absence of cardiac dysrhythmias or at baseline  Outcome: Progressing     Problem: Musculoskeletal - Adult  Goal: Return mobility to safest level of function  Outcome: Progressing     Problem: Musculoskeletal - Adult  Goal:

## 2024-10-15 NOTE — PROGRESS NOTES
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    Chronic respiratory failure with hypoxia J96.11    Chronic pain syndrome G89.4    Lumbar degenerative disc disease M51.369    Chronic low back pain M54.50, G89.29    Immunocompromised ([facility]) D84.9    Iron deficiency E61.1    Pulmonary hypertension ([facility]) I27.20    Chronic diastolic heart failure ([facility]) I50.32    Acute on chronic respiratory failure with hypoxia J96.21    IgG deficiency ([facility]) D80.3    Acute on chronic hypoxic respiratory failure J96.21       SUBJECTIVE:  Jason Cox states he feels no significantly different than from admission and is still short of breath with minimal effort.  Two-dimensional echocardiogram completed and does not demonstrate evidence for severe pulmonary hypertension.    REVIEW OF SYSTEMS:  General ROS: negative for - fatigue, malaise,  weight gain or weight loss  Psychological ROS: negative for - anxiety , depression  Ophthalmic ROS: negative for - decreased vision or visual distortion.  ENT ROS: negative  Allergy and Immunology ROS: negative  Hematological and Lymphatic ROS: negative  Endocrine: no heat or cold intolerance and no polyphagia, polydipsia, or polyuria  Respiratory ROS: positive for - cough and shortness of breath  Cardiovascular ROS: positive for - dyspnea on exertion and irregular heartbeat.  Gastrointestinal ROS: no abdominal pain, change in bowel habits, or black or bloody stools  Genito-Urinary ROS: no nocturia, dysuria, trouble voiding, frequency or hematuria  Musculoskeletal ROS: negative for- myalgias, arthralgias, or claudication  Neurological ROS: no TIA or stroke symptoms otherwise no significant change in symptoms or problems since yesterday as  Shelly,  FACP,FACC,[facility]      NOTE:  This report was transcribed using voice recognition software.  Every effort was made to ensure accuracy; however, inadvertent computerized transcription errors may be present

## 2024-10-15 NOTE — PLAN OF CARE
Problem: Discharge Planning  Goal: Discharge to home or other facility with appropriate resources  10/15/2024 0918 by Ariana Doran RN  Outcome: Progressing  10/15/2024 0026 by Sandy Em RN  Outcome: Progressing  Flowsheets (Taken 10/14/2024 2202)  Discharge to home or other facility with appropriate resources: Identify barriers to discharge with patient and caregiver     Problem: Safety - Adult  Goal: Free from fall injury  10/15/2024 0918 by Ariana Doran RN  Outcome: Progressing  10/15/2024 0026 by Sandy Em RN  Outcome: Progressing     Problem: Pain  Goal: Verbalizes/displays adequate comfort level or baseline comfort level  10/15/2024 0918 by Ariana Doran RN  Outcome: Progressing  10/15/2024 0026 by Sandy Em RN  Outcome: Progressing     Problem: Respiratory - Adult  Goal: Achieves optimal ventilation and oxygenation  10/15/2024 0918 by Ariana Doran RN  Outcome: Progressing  10/15/2024 0026 by Sandy Em RN  Outcome: Progressing     Problem: Cardiovascular - Adult  Goal: Maintains optimal cardiac output and hemodynamic stability  10/15/2024 0918 by Ariana Doran RN  Outcome: Progressing  10/15/2024 0026 by Sandy Em RN  Outcome: Progressing  Goal: Absence of cardiac dysrhythmias or at baseline  10/15/2024 0918 by Ariana Doran RN  Outcome: Progressing  10/15/2024 0026 by Sandy Em RN  Outcome: Progressing     Problem: Musculoskeletal - Adult  Goal: Return mobility to safest level of function  10/15/2024 0918 by Ariana Doran RN  Outcome: Progressing  10/15/2024 0026 by Sandy Em RN  Outcome: Progressing  Goal: Maintain proper alignment of affected body part  10/15/2024 0918 by Ariana Doran RN  Outcome: Progressing  10/15/2024 0026 by Sandy Em RN  Outcome: Progressing

## 2024-10-15 NOTE — PROGRESS NOTES
Pulmonary Progress Note    Admit Date: 10/9/2024  Hospital day                               PCP: Juni Loyd MD    Chief Complaint (s):  Patient Active Problem List   Diagnosis    COPD (chronic obstructive pulmonary disease) (HCC)    Thoracic ascending aortic aneurysm    S/P lumbar fusion    Hilar adenopathy    Pulmonary Mycobacterium avium complex (MAC) infection (HCC)    Chronic respiratory failure with hypoxia    Chronic pain syndrome    Lumbar degenerative disc disease    Chronic low back pain    Immunocompromised (HCC)    Iron deficiency    Pulmonary hypertension (HCC)    Chronic diastolic heart failure (HCC)    Acute on chronic respiratory failure with hypoxia    IgG deficiency (HCC)    Acute on chronic hypoxic respiratory failure       Subjective:  Patient seen awake and alert sitting up in bed on 7 L nasal cannula. He admits to improvement in his breathing since bronchoscopy. Denies wheezing or a cough. Respiratory culture preliminary with few gram positive cocci and many gram positive rods.       Vitals:  VITALS:  /72   Pulse (!) 110   Temp 97.5 °F (36.4 °C) (Oral)   Resp 18   Ht 1.854 m (6' 1\")   Wt 83.9 kg (185 lb)   SpO2 (!) 85%   BMI 24.41 kg/m²     24HR INTAKE/OUTPUT:      Intake/Output Summary (Last 24 hours) at 10/15/2024 1148  Last data filed at 10/15/2024 0705  Gross per 24 hour   Intake 370 ml   Output 2050 ml   Net -1680 ml       24HR PULSE OXIMETRY RANGE:    SpO2  Av.4 %  Min: 85 %  Max: 96 %    Medications:  IV:   sodium chloride         Scheduled Meds:   predniSONE  40 mg Oral Daily    sodium chloride  1 spray Each Nostril BID    fluticasone  1 spray Each Nostril Daily    sodium chloride (Inhalant)  4 mL Nebulization BID    budesonide  500 mcg Nebulization BID RT    bumetanide  1 mg Oral Once per day on     arformoterol tartrate  15 mcg Nebulization BID RT    sodium chloride flush  5-40 mL IntraVENous 2 times per day     mucous plugging  History of Pseudomonas pneumonia       Plan:  Cultures pending   Aerosol treatments  Steroids  Vest treatments    Time at the bedside, reviewing labs and radiographs, reviewing updated notes and consultations, discussing with staff and family was more than 35 minutes.    Please note that voice recognition technology was used in the preparation of this note and make therefore it may contain inadvertent transcription errors.  If the patient is a COVID 19 isolation patient, the above physical exam reflects that of the examining physician for the day.        MIGUE Mcclellan-NP    [facility]    [facility], [address] [place] [address], [address], [place] [address]  Office visits:  [address] [place] [address]

## 2024-10-16 ENCOUNTER — CARE COORDINATION (OUTPATIENT)
Dept: CARE COORDINATION | Age: 74
End: 2024-10-16

## 2024-10-16 DIAGNOSIS — J96.21 ACUTE ON CHRONIC HYPOXIC RESPIRATORY FAILURE: Primary | ICD-10-CM

## 2024-10-16 LAB
MICROORGANISM SPEC CULT: ABNORMAL
MICROORGANISM/AGENT SPEC: ABNORMAL
SERVICE CMNT-IMP: ABNORMAL
SPECIMEN DESCRIPTION: ABNORMAL

## 2024-10-16 PROCEDURE — 1111F DSCHRG MED/CURRENT MED MERGE: CPT | Performed by: INTERNAL MEDICINE

## 2024-10-16 NOTE — CARE COORDINATION
Care Transitions Note    Initial Call - Call within 2 business days of discharge: Yes    Patient Current Location:  Home: [address] [place] [address]    Care Transition Nurse contacted the patient by telephone to perform post hospital discharge assessment, verified name and  as identifiers. Provided introduction to self, and explanation of the Care Transition Nurse role.     Patient: Jason Cox      Patient : 1950   MRN: [id number]      Reason for Admission: 10/9/2024 - 10/15/2024 [facility]. Resp failure/COPD, Underwent bronch with washings growing GPC/GPR.   Discharge Date: 10/15/24    RURS: Readmission Risk Score: 14.4  NR  CT    Follow up with Juni Lody MD (Internal Medicine)  Call Rolf Little MD (Pulmonology)  Call Tre Bravo DO (Cardiology)    START taking:  amoxicillin-clavulanate (AUGMENTIN)  azithromycin (ZITHROMAX)  predniSONE (DELTASONE)  Start taking on: 2024    Last Discharge Facility       Date Complaint Diagnosis Description Type Department Provider    10/9/24 Respiratory Distress Acute exacerbation of chronic obstructive pulmonary disease (COPD) (HCC) ... ED to Hosp-Admission (Discharged) (ADMITTED) Glenys Seals MD; Austin Phelan DO...            Was this an external facility discharge? No    Additional needs identified to be addressed with provider   No needs identified             Method of communication with provider: none.    Patients top risk factors for readmission: COPD on 7L NC con't at home, CHF history.    Interventions to address risk factors:   Check sats daily and report trending sats 88% or less to your pulmonologist or return to ED if symptomatic.  Walk hourly while awake, cascade cough, and perform pulmonary deep breathing.  Take your medications as directed and complete full course of your antibiotics.     Care Summary Note: CTN spoke w/ Jason. Pt reports his exertional SOB is

## 2024-10-17 LAB
MICROORGANISM SPEC CULT: NORMAL
MICROORGANISM SPEC CULT: NORMAL
MICROORGANISM/AGENT SPEC: NORMAL
MICROORGANISM/AGENT SPEC: NORMAL
SERVICE CMNT-IMP: NORMAL
SERVICE CMNT-IMP: NORMAL
SPECIMEN DESCRIPTION: NORMAL
SPECIMEN DESCRIPTION: NORMAL

## 2024-10-23 ENCOUNTER — CARE COORDINATION (OUTPATIENT)
Dept: CARE COORDINATION | Age: 74
End: 2024-10-23

## 2024-10-23 LAB
MICROORGANISM SPEC CULT: NORMAL
MICROORGANISM/AGENT SPEC: NORMAL
SERVICE CMNT-IMP: NORMAL
SPECIMEN DESCRIPTION: NORMAL

## 2024-10-23 NOTE — CARE COORDINATION
Care Transitions Note    Follow Up Call     CTN left HIPAA VM, purpose of call, my contact info for subsequent outreach. Noting OP bronchoscopy 10/14/24. Review PCP, cardio, and pulm fu appts made.     Reason for Admission: 10/9/2024 - 10/15/2024 [facility] IP. Resp failure/COPD, Underwent bronch with washings growing GPC/GPR.       Follow Up Appointment:   Future Appointments         Provider Specialty Dept Phone    11/6/2024 3:00 PM Rolf Little MD Pulmonology [phone]    12/17/2024 11:15 AM Juni Loyd MD Internal Medicine [phone]    2/6/2025 1:30 PM Dea Cason APRN - NP Pulmonology [phone]    2/18/2025 2:00 PM Chirag Dawson,  Otolaryngology [phone]    2/25/2025 9:00 AM Korey Duuqe MD Infectious Diseases [phone]            Kylie Jorge RN

## 2024-10-31 ENCOUNTER — CARE COORDINATION (OUTPATIENT)
Dept: CARE COORDINATION | Age: 74
End: 2024-10-31

## 2024-10-31 NOTE — CARE COORDINATION
Care Transitions Note    Follow Up Call     Reason for Admission: 10/9/2024 - 10/15/2024 [facility]. Resp failure/COPD, Underwent bronch with washings growing GPC/GPR.     Patient Current Location:  Home: [address] [place] [address]    Care Transition Nurse contacted the patient by telephone. Verified name and  as identifiers.    Additional needs identified to be addressed with provider   No needs identified                 Method of communication with provider: none.    Care Summary Note: CTN spoke w/ Jason. Pt wears 6.5L NC con't and shows rest sats of 91%. Notes HR as 78. States his baseline is 87%-90%. He states he has much less coughing and is not bringing up much anymore. To see Pulm again next week. May schedule periodic bronch washings if felt warranted. No fevers, chills, or flu-like symptoms. Feels his COPD is stable. No med refill needs today.    Remote Patient Monitoring:  Offered patient enrollment in the Remote Patient Monitoring (RPM) program for in-home monitoring: [facility] PCP.    Assessments:  Care Transitions Subsequent and Final Call    Subsequent and Final Calls  Do you have any ongoing symptoms?: Yes  Patient-reported symptoms: Shortness of Breath  Have your medications changed?: No  Do you have any questions related to your medications?: No  Do you currently have any active services?: Yes  Are you currently active with any services?: Outpatient/Community Services  Do you have any needs or concerns that I can assist you with?: No  Identified Barriers: Lack of Education  Care Transitions Interventions  Other Interventions:              Follow Up Appointment:     Future Appointments         Provider Specialty Dept Phone    2024 3:00 PM Rolf Little MD Pulmonology [phone]    2024 11:15 AM Juni Loyd MD Internal Medicine [phone]    2025 1:30 PM Dea Cason APRN - [facility] Pulmonology [phone]    2025 2:00 PM

## 2024-11-07 ENCOUNTER — CARE COORDINATION (OUTPATIENT)
Dept: CARE COORDINATION | Age: 74
End: 2024-11-07

## 2024-11-11 ENCOUNTER — ANESTHESIA (OUTPATIENT)
Dept: ENDOSCOPY | Age: 74
End: 2024-11-11
Payer: MEDICARE

## 2024-11-11 ENCOUNTER — HOSPITAL ENCOUNTER (OUTPATIENT)
Age: 74
Setting detail: OUTPATIENT SURGERY
Discharge: HOME | End: 2024-11-11
Attending: INTERNAL MEDICINE | Admitting: INTERNAL MEDICINE
Payer: MEDICARE

## 2024-11-11 ENCOUNTER — ANESTHESIA EVENT (OUTPATIENT)
Dept: ENDOSCOPY | Age: 74
End: 2024-11-11
Payer: MEDICARE

## 2024-11-11 VITALS
OXYGEN SATURATION: 99 % | BODY MASS INDEX: 25.06 KG/M2 | HEIGHT: 72 IN | HEART RATE: 89 BPM | DIASTOLIC BLOOD PRESSURE: 72 MMHG | RESPIRATION RATE: 25 BRPM | SYSTOLIC BLOOD PRESSURE: 112 MMHG | WEIGHT: 185 LBS | TEMPERATURE: 97 F

## 2024-11-11 DIAGNOSIS — T17.500A MUCUS PLUGGING OF BRONCHI: ICD-10-CM

## 2024-11-11 PROCEDURE — 3609027000 HC BRONCHOSCOPY: Performed by: INTERNAL MEDICINE

## 2024-11-11 PROCEDURE — 6360000002 HC RX W HCPCS: Performed by: NURSE ANESTHETIST, CERTIFIED REGISTERED

## 2024-11-11 PROCEDURE — 2580000003 HC RX 258: Performed by: NURSE ANESTHETIST, CERTIFIED REGISTERED

## 2024-11-11 PROCEDURE — 2500000003 HC RX 250 WO HCPCS: Performed by: NURSE ANESTHETIST, CERTIFIED REGISTERED

## 2024-11-11 PROCEDURE — 87070 CULTURE OTHR SPECIMN AEROBIC: CPT

## 2024-11-11 PROCEDURE — 7100000010 HC PHASE II RECOVERY - FIRST 15 MIN: Performed by: INTERNAL MEDICINE

## 2024-11-11 PROCEDURE — 87205 SMEAR GRAM STAIN: CPT

## 2024-11-11 PROCEDURE — 7100000011 HC PHASE II RECOVERY - ADDTL 15 MIN: Performed by: INTERNAL MEDICINE

## 2024-11-11 PROCEDURE — 2709999900 HC NON-CHARGEABLE SUPPLY: Performed by: INTERNAL MEDICINE

## 2024-11-11 PROCEDURE — 3700000001 HC ADD 15 MINUTES (ANESTHESIA): Performed by: INTERNAL MEDICINE

## 2024-11-11 PROCEDURE — 3700000000 HC ANESTHESIA ATTENDED CARE: Performed by: INTERNAL MEDICINE

## 2024-11-11 RX ORDER — LIDOCAINE HYDROCHLORIDE 20 MG/ML
INJECTION, SOLUTION INFILTRATION; PERINEURAL
Status: DISCONTINUED | OUTPATIENT
Start: 2024-11-11 | End: 2024-11-11 | Stop reason: SDUPTHER

## 2024-11-11 RX ORDER — SODIUM CHLORIDE 9 MG/ML
INJECTION, SOLUTION INTRAVENOUS
Status: DISCONTINUED | OUTPATIENT
Start: 2024-11-11 | End: 2024-11-11 | Stop reason: SDUPTHER

## 2024-11-11 RX ORDER — AZITHROMYCIN 250 MG/1
500 TABLET, FILM COATED ORAL
COMMUNITY

## 2024-11-11 RX ORDER — PROPOFOL 10 MG/ML
INJECTION, EMULSION INTRAVENOUS
Status: DISCONTINUED | OUTPATIENT
Start: 2024-11-11 | End: 2024-11-11 | Stop reason: SDUPTHER

## 2024-11-11 RX ADMIN — LIDOCAINE HYDROCHLORIDE 100 MG: 20 INJECTION, SOLUTION INFILTRATION; PERINEURAL at 14:41

## 2024-11-11 RX ADMIN — SODIUM CHLORIDE: 9 INJECTION, SOLUTION INTRAVENOUS at 14:15

## 2024-11-11 RX ADMIN — PROPOFOL 80 MG: 10 INJECTION, EMULSION INTRAVENOUS at 14:41

## 2024-11-11 ASSESSMENT — ENCOUNTER SYMPTOMS
SHORTNESS OF BREATH: 1
DYSPNEA ACTIVITY LEVEL: NO INTERVAL CHANGE

## 2024-11-11 ASSESSMENT — LIFESTYLE VARIABLES: SMOKING_STATUS: 0

## 2024-11-11 ASSESSMENT — PAIN SCALES - GENERAL: PAINLEVEL_OUTOF10: 0

## 2024-11-11 ASSESSMENT — PAIN - FUNCTIONAL ASSESSMENT
PAIN_FUNCTIONAL_ASSESSMENT: 0-10
PAIN_FUNCTIONAL_ASSESSMENT: 0-10

## 2024-11-11 ASSESSMENT — COPD QUESTIONNAIRES: CAT_SEVERITY: NO INTERVAL CHANGE

## 2024-11-11 NOTE — ANESTHESIA POSTPROCEDURE EVALUATION
Department of Anesthesiology  Postprocedure Note    Patient: Jason Cox  MRN: [id number]  YOB: 1950  Date of evaluation: 11/11/2024    Procedure Summary       Date: 11/11/24 Room / Location: [place] [address] / [facility]    Anesthesia Start: 1431 Anesthesia Stop:     Procedure: BRONCHOSCOPY DIAGNOSTIC OR CELL WASH ONLY Diagnosis:       Mucus plugging of bronchi      (Mucus plugging of bronchi [T17.500A])    Surgeons: Rolf Little MD Responsible Provider: Pj Tanner MD    Anesthesia Type: MAC ASA Status: 4            Anesthesia Type: No value filed.    Moon Phase I: Moon Score: 9    Moon Phase II:      Anesthesia Post Evaluation    Patient location during evaluation: PACU  Patient participation: complete - patient participated  Level of consciousness: awake  Airway patency: patent  Nausea & Vomiting: no nausea and no vomiting  Cardiovascular status: hemodynamically stable  Respiratory status: acceptable  Hydration status: euvolemic  Pain management: adequate        No notable events documented.

## 2024-11-11 NOTE — ANESTHESIA PRE PROCEDURE
BP Readings from Last 3 Encounters:   10/15/24 103/72   09/24/24 115/60   08/27/24 122/76       NPO Status:                                                                                 BMI:   Wt Readings from Last 3 Encounters:   11/08/24 83.9 kg (185 lb)   11/06/24 83.9 kg (185 lb)   10/09/24 83.9 kg (185 lb)     Body mass index is 25.09 kg/m².    CBC:   Lab Results   Component Value Date/Time    WBC 8.5 10/12/2024 03:15 AM    RBC 5.31 10/12/2024 03:15 AM    HGB 16.3 10/12/2024 03:15 AM    HCT 48.8 10/12/2024 03:15 AM    MCV 91.9 10/12/2024 03:15 AM    RDW 14.6 10/12/2024 03:15 AM     10/12/2024 03:15 AM       CMP:   Lab Results   Component Value Date/Time     10/12/2024 03:15 AM    K 4.4 10/12/2024 03:15 AM    K 4.4 11/18/2022 11:23 AM     10/12/2024 03:15 AM    CO2 25 10/12/2024 03:15 AM    BUN 15 10/12/2024 03:15 AM    CREATININE 0.6 10/12/2024 03:15 AM    GFRAA >60 08/01/2022 03:55 PM    LABGLOM >90 10/12/2024 03:15 AM    LABGLOM >90 04/30/2024 03:43 AM    GLUCOSE 123 10/12/2024 03:15 AM    CALCIUM 9.8 10/12/2024 03:15 AM    BILITOT 0.9 10/09/2024 11:15 AM    ALKPHOS 96 10/09/2024 11:15 AM    AST 26 10/09/2024 11:15 AM    ALT 7 10/09/2024 11:15 AM       POC Tests: No results for input(s): \"POCGLU\", \"POCNA\", \"POCK\", \"POCCL\", \"POCBUN\", \"POCHEMO\", \"POCHCT\" in the last 72 hours.    Coags:   Lab Results   Component Value Date/Time    PROTIME 11.6 03/28/2024 11:47 AM    INR 1.0 03/28/2024 11:47 AM    APTT 31.4 05/30/2019 11:38 AM       HCG (If Applicable): No results found for: \"PREGTESTUR\", \"PREGSERUM\", \"HCG\", \"HCGQUANT\"     ABGs: No results found for: \"PHART\", \"PO2ART\", \"SOZ3YAY\", \"VFK8DBW\", \"BEART\", \"F5FRKFDX\"     Type & Screen (If Applicable):  No results found for: \"LABABO\"    Drug/Infectious Status (If Applicable):  No results found for: \"HIV\", \"HEPCAB\"    COVID-19 Screening (If Applicable):   Lab Results   Component Value Date/Time    COVID19 Not

## 2024-11-11 NOTE — PROGRESS NOTES
Dr. Little here to see patient. Aware pt sat 85% on 9 liters NC.  
nail polish, make up or hair products on the day of surgery    [x] You can expect a call the business day prior to procedure to notify you if your arrival time changes    [x] If you receive a survey after surgery we would greatly appreciate your comments    [] Parent/guardian of a minor must accompany their child and remain on the premises  the entire time they are under our care     [] Pediatric patients may bring favorite toy, blanket or comfort item with them    [] A caregiver or family member must remain with the patient during their stay if they are mentally handicapped, have dementia, disoriented or unable to use a call light or would be a safety concern if left unattended    [x] Please notify surgeon if you develop any illness between now and time of surgery (cold, cough, sore throat, fever, nausea, vomiting) or any signs of infections  including skin, wounds, and dental.

## 2024-11-11 NOTE — OP NOTE
Operative Note      Patient: Jason Cox  YOB: 1950  MRN: [id number]    Date of Procedure: 11/11/2024    Pre-Op Diagnosis Codes:      * Mucus plugging of bronchi [T17.500A]    Post-Op Diagnosis: Same       Procedure(s):  BRONCHOSCOPY DIAGNOSTIC OR CELL WASH ONLY    Surgeon(s):  Rolf Little MD    Assistant:   * No surgical staff found *    Anesthesia: Monitor Anesthesia Care    Estimated Blood Loss (mL): Minimal    Complications: None    Specimens:   ID Type Source Tests Collected by Time Destination   1 : left lower lobe wash Tissue Lung GRAM STAIN, CULTURE, VIRUS, RESPIRATORY Rolf Little MD 11/11/2024 1447        Implants:  * No implants in log *      Drains: * No LDAs found *    Findings:  Infection Present At Time Of Surgery (PATOS) (choose all levels that have infection present):  No infection present  Other Findings: Mucous plugging at the left mainstem    Detailed Description of Procedure:   The patient was informed procedure, consent was obtained.  A fiberoptic bronchoscope was passed the oropharynx.  The vocal cords move promptly the midline.  The trachea was then intubated and the right left lungs were inspected.  Occluding the left mainstem bronchus with thick inspissated mucus.  This was aggressively suctioned.  No distal endobronchial abnormalities were noted.    The bronchoscope was then withdrawn and the right lung was inspected.  There is no evidence of endobronchial lesion or mucosal abnormality.  Minimal mucus plugging was seen on that side.    The patient tolerated the procedure well and was sent to recovery in stable condition.    Electronically signed by Rolf Little MD on 11/11/2024 at 2:51 PM

## 2024-11-11 NOTE — H&P
Outpatient Pulmonary H&P                               PCP: Juni Loyd MD     Problem List:  Problem List       Patient Active Problem List   Diagnosis    COPD (chronic obstructive pulmonary disease) (HCC)    Thoracic ascending aortic aneurysm    S/P lumbar fusion    Hilar adenopathy    Pulmonary Mycobacterium avium complex (MAC) infection (HCC)    Chronic respiratory failure with hypoxia    Chronic pain syndrome    Lumbar degenerative disc disease    Chronic low back pain    Immunocompromised (HCC)    Iron deficiency    Pulmonary hypertension (HCC)    Chronic diastolic heart failure (HCC)    Acute on chronic respiratory failure with hypoxia    IgG deficiency (HCC)    Acute on chronic hypoxic respiratory failure               CC: Mucus plugging    HPI:  The patient is a 74-year-old male with end-stage COPD and a history of MAC as well as a recent left lower lobe pneumonic infiltrate who presents as an outpatient with increasing shortness of breath.  In the past, therapeutic bronchoscopies have been undertaken for severe diffuse mucous plugging.    The patient is scheduled for transplantation evaluation at [facility] in the near future.        Vitals:  VITALS:  Pulse 100   Resp 22   Ht 1.829 m (6')   Wt 83.9 kg (185 lb)   SpO2 (!) 70% Comment: walking in room 5liters pulse [id number]  BMI 25.09 kg/m²      Medications:  Current Facility-Administered Medications          Current Outpatient Medications   Medication Sig Dispense Refill    bumetanide (BUMEX) 1 MG tablet Take 1 tablet by mouth three times a week *MON-WED-FRI* 36 tablet 0    fluticasone (FLONASE) 50 MCG/ACT nasal spray 1 spray by Each Nostril route daily 16 g 3    ipratropium 0.5 mg-albuterol 2.5 mg (DUONEB) 0.5-2.5 (3) MG/3ML SOLN nebulizer solution Inhale 3 mLs into the lungs every 6 hours as needed for Shortness of Breath 90 each 1    budesonide (PULMICORT) 0.5 MG/2ML nebulizer suspension Take 2 mLs by nebulization

## 2024-11-13 LAB
MICROORGANISM SPEC CULT: NORMAL
MICROORGANISM/AGENT SPEC: NORMAL
SERVICE CMNT-IMP: NORMAL
SERVICE CMNT-IMP: NORMAL
SPECIMEN DESCRIPTION: NORMAL

## 2024-11-14 ENCOUNTER — CARE COORDINATION (OUTPATIENT)
Dept: CARE COORDINATION | Age: 74
End: 2024-11-14

## 2024-11-14 NOTE — CARE COORDINATION
any questions related to your medications?: No  Are you currently active with any services?: Outpatient/Community Services  Do you have any needs or concerns that I can assist you with?: No  Identified Barriers: Lack of Education  Care Transitions Interventions     Other Services: Completed (Comment: ACM)    Palliative Care: Declined     Other Interventions:              Follow Up Appointment:     Future Appointments         Provider Specialty Dept Phone    12/17/2024 11:15 AM Juni Loyd MD Internal Medicine [phone]    2/6/2025 1:30 PM Dea Cason APRN - NP Pulmonology [phone]    2/18/2025 2:00 PM Chirag Dawson DO Otolaryngology [phone]    2/25/2025 9:00 AM Korey Duque MD Infectious Diseases [phone]            Kylie Jorge RN

## 2024-11-15 ENCOUNTER — CARE COORDINATION (OUTPATIENT)
Dept: CARE COORDINATION | Age: 74
End: 2024-11-15

## 2024-11-20 ENCOUNTER — CARE COORDINATION (OUTPATIENT)
Dept: CARE COORDINATION | Age: 74
End: 2024-11-20

## 2024-11-20 SDOH — HEALTH STABILITY: PHYSICAL HEALTH: ON AVERAGE, HOW MANY DAYS PER WEEK DO YOU ENGAGE IN MODERATE TO STRENUOUS EXERCISE (LIKE A BRISK WALK)?: 0 DAYS

## 2024-11-20 SDOH — HEALTH STABILITY: PHYSICAL HEALTH: ON AVERAGE, HOW MANY MINUTES DO YOU ENGAGE IN EXERCISE AT THIS LEVEL?: 0 MIN

## 2024-11-20 ASSESSMENT — SOCIAL DETERMINANTS OF HEALTH (SDOH)
HOW OFTEN DO YOU GET TOGETHER WITH FRIENDS OR RELATIVES?: MORE THAN THREE TIMES A WEEK
HOW OFTEN DO YOU ATTEND CHURCH OR RELIGIOUS SERVICES?: 1 TO 4 TIMES PER YEAR
DO YOU BELONG TO ANY CLUBS OR ORGANIZATIONS SUCH AS CHURCH GROUPS UNIONS, FRATERNAL OR ATHLETIC GROUPS, OR SCHOOL GROUPS?: NO
IN A TYPICAL WEEK, HOW MANY TIMES DO YOU TALK ON THE PHONE WITH FAMILY, FRIENDS, OR NEIGHBORS?: MORE THAN THREE TIMES A WEEK
HOW OFTEN DO YOU ATTENT MEETINGS OF THE CLUB OR ORGANIZATION YOU BELONG TO?: NEVER

## 2024-11-20 NOTE — CARE COORDINATION
Ambulatory Care Coordination Note     2024 12:40 PM     Patient Current Location:  Home: [address] [place] [address]     This patient was received as a referral from CTN    ACM contacted the patient by telephone. Verified name and  with patient as identifiers. Provided introduction to self, and explanation of the ACM role.   Patient accepted care management services at this time.          ACM: Willow Solorzano RN     Challenges to be reviewed by the provider   Additional needs identified to be addressed with provider No  none               Method of communication with provider: phone.    Utilization: N/A - Initial Call     Care Summary Note: Explained the Care Coordination Program to patient.  Verbalized understanding and is agreeable to service.      Jason denies any new concerns or complaints today. He is using his oxygen at 6.5 LNC continuously. He is dyspneic at rest which he mentions is not anything new. We discussed Palliative Care and he is not interested at this time. He feels it will not do him any good and it is hard for him to get out . Support offered by actively listening with validation of his feelings. He denied dizziness, headache. He has support by  his wife however she has some health issues herself. Will continue to assess needs and support.    Follow HF Zone Tool:  Self Manages, Has Caregiver Assistance, Verbalizes Understanding, Needs Reinforcement, and Able to teachback  Daily weights before breakfast and log them:  Self Manages, Has Caregiver Assistance, Verbalizes Understanding, Needs Reinforcement, and Able to teachback  Follow low sodium diet:  Self Manages, Has Caregiver Assistance, Verbalizes Understanding, Needs Reinforcement, and Able to teachback  Report weight gain or loss of greater than or equal to 3 pounds in 1-7 days:  Self Manages, Has Caregiver Assistance, Verbalizes Understanding, and Needs Reinforcement  Balance activity and rest periods:  Verbalizes

## 2024-11-23 ENCOUNTER — APPOINTMENT (OUTPATIENT)
Dept: GENERAL RADIOLOGY | Age: 74
DRG: 189 | End: 2024-11-23
Payer: MEDICARE

## 2024-11-23 ENCOUNTER — HOSPITAL ENCOUNTER (INPATIENT)
Age: 74
LOS: 4 days | Discharge: HOME | DRG: 189 | End: 2024-11-27
Attending: STUDENT IN AN ORGANIZED HEALTH CARE EDUCATION/TRAINING PROGRAM | Admitting: STUDENT IN AN ORGANIZED HEALTH CARE EDUCATION/TRAINING PROGRAM
Payer: MEDICARE

## 2024-11-23 ENCOUNTER — APPOINTMENT (OUTPATIENT)
Dept: CT IMAGING | Age: 74
DRG: 189 | End: 2024-11-23
Payer: MEDICARE

## 2024-11-23 DIAGNOSIS — J43.9 PULMONARY EMPHYSEMA, UNSPECIFIED EMPHYSEMA TYPE (HCC): ICD-10-CM

## 2024-11-23 DIAGNOSIS — J44.1 COPD EXACERBATION (HCC): Primary | ICD-10-CM

## 2024-11-23 DIAGNOSIS — J96.21 ACUTE ON CHRONIC HYPOXIC RESPIRATORY FAILURE: ICD-10-CM

## 2024-11-23 DIAGNOSIS — R06.00 DYSPNEA AND RESPIRATORY ABNORMALITIES: ICD-10-CM

## 2024-11-23 DIAGNOSIS — R06.89 DYSPNEA AND RESPIRATORY ABNORMALITIES: ICD-10-CM

## 2024-11-23 DIAGNOSIS — I50.9 ACUTE ON CHRONIC CONGESTIVE HEART FAILURE, UNSPECIFIED HEART FAILURE TYPE (HCC): ICD-10-CM

## 2024-11-23 PROBLEM — I50.33 ACUTE ON CHRONIC HEART FAILURE WITH PRESERVED EJECTION FRACTION (HFPEF) (HCC): Status: ACTIVE | Noted: 2021-04-08

## 2024-11-23 LAB
ALBUMIN SERPL-MCNC: 3.9 G/DL (ref 3.5–5.2)
ALP SERPL-CCNC: 67 U/L (ref 40–129)
ALT SERPL-CCNC: 9 U/L (ref 0–40)
ANION GAP SERPL CALCULATED.3IONS-SCNC: 12 MMOL/L (ref 7–16)
AST SERPL-CCNC: 18 U/L (ref 0–39)
B.E.: 0.3 MMOL/L (ref -3–3)
BASOPHILS # BLD: 0.03 K/UL (ref 0–0.2)
BASOPHILS NFR BLD: 0 % (ref 0–2)
BILIRUB SERPL-MCNC: 0.7 MG/DL (ref 0–1.2)
BNP SERPL-MCNC: 4665 PG/ML (ref 0–125)
BUN SERPL-MCNC: 8 MG/DL (ref 6–23)
CALCIUM SERPL-MCNC: 9.2 MG/DL (ref 8.6–10.2)
CHLORIDE SERPL-SCNC: 104 MMOL/L (ref 98–107)
CO2 SERPL-SCNC: 26 MMOL/L (ref 22–29)
COHB: 1.2 % (ref 0–1.5)
CREAT SERPL-MCNC: 0.9 MG/DL (ref 0.7–1.2)
CRITICAL: ABNORMAL
D-DIMER QUANTITATIVE: 444 NG/ML DDU (ref 0–230)
DATE ANALYZED: ABNORMAL
DATE OF COLLECTION: ABNORMAL
EOSINOPHIL # BLD: 0.03 K/UL (ref 0.05–0.5)
EOSINOPHILS RELATIVE PERCENT: 0 % (ref 0–6)
ERYTHROCYTE [DISTWIDTH] IN BLOOD BY AUTOMATED COUNT: 15.6 % (ref 11.5–15)
GFR, ESTIMATED: >90 ML/MIN/1.73M2
GLUCOSE SERPL-MCNC: 90 MG/DL (ref 74–99)
HCO3: 23.1 MMOL/L (ref 22–26)
HCT VFR BLD AUTO: 44.4 % (ref 37–54)
HGB BLD-MCNC: 14.7 G/DL (ref 12.5–16.5)
HHB: 9.7 % (ref 0–5)
IMM GRANULOCYTES # BLD AUTO: 0.03 K/UL (ref 0–0.58)
IMM GRANULOCYTES NFR BLD: 0 % (ref 0–5)
LAB: ABNORMAL
LYMPHOCYTES NFR BLD: 0.91 K/UL (ref 1.5–4)
LYMPHOCYTES RELATIVE PERCENT: 11 % (ref 20–42)
Lab: 1250
MAGNESIUM SERPL-MCNC: 1.7 MG/DL (ref 1.6–2.6)
MCH RBC QN AUTO: 31.4 PG (ref 26–35)
MCHC RBC AUTO-ENTMCNC: 33.1 G/DL (ref 32–34.5)
MCV RBC AUTO: 94.9 FL (ref 80–99.9)
METHB: 0.3 % (ref 0–1.5)
MODE: ABNORMAL
MONOCYTES NFR BLD: 0.48 K/UL (ref 0.1–0.95)
MONOCYTES NFR BLD: 6 % (ref 2–12)
NEUTROPHILS NFR BLD: 83 % (ref 43–80)
NEUTS SEG NFR BLD: 7.12 K/UL (ref 1.8–7.3)
O2 CONTENT: 19.4 ML/DL
O2 SATURATION: 90.2 % (ref 92–98.5)
O2HB: 88.8 % (ref 94–97)
OPERATOR ID: 8632
PATIENT TEMP: 32 C
PCO2: 32.6 MMHG (ref 35–45)
PH BLOOD GAS: 7.47 (ref 7.35–7.45)
PLATELET # BLD AUTO: 235 K/UL (ref 130–450)
PMV BLD AUTO: 10.1 FL (ref 7–12)
PO2: 56.8 MMHG (ref 75–100)
POTASSIUM SERPL-SCNC: 3.3 MMOL/L (ref 3.5–5)
PROT SERPL-MCNC: 6.1 G/DL (ref 6.4–8.3)
RBC # BLD AUTO: 4.68 M/UL (ref 3.8–5.8)
SARS-COV-2 RDRP RESP QL NAA+PROBE: NOT DETECTED
SODIUM SERPL-SCNC: 142 MMOL/L (ref 132–146)
SOURCE, BLOOD GAS: ABNORMAL
SPECIMEN DESCRIPTION: NORMAL
THB: 15.6 G/DL (ref 11.5–16.5)
TIME ANALYZED: 1254
TROPONIN I SERPL HS-MCNC: 13 NG/L (ref 0–11)
TROPONIN I SERPL HS-MCNC: 15 NG/L (ref 0–11)
WBC OTHER # BLD: 8.6 K/UL (ref 4.5–11.5)

## 2024-11-23 PROCEDURE — 85379 FIBRIN DEGRADATION QUANT: CPT

## 2024-11-23 PROCEDURE — 80053 COMPREHEN METABOLIC PANEL: CPT

## 2024-11-23 PROCEDURE — 2700000000 HC OXYGEN THERAPY PER DAY

## 2024-11-23 PROCEDURE — 6360000002 HC RX W HCPCS: Performed by: STUDENT IN AN ORGANIZED HEALTH CARE EDUCATION/TRAINING PROGRAM

## 2024-11-23 PROCEDURE — 93005 ELECTROCARDIOGRAM TRACING: CPT | Performed by: STUDENT IN AN ORGANIZED HEALTH CARE EDUCATION/TRAINING PROGRAM

## 2024-11-23 PROCEDURE — 2580000003 HC RX 258: Performed by: STUDENT IN AN ORGANIZED HEALTH CARE EDUCATION/TRAINING PROGRAM

## 2024-11-23 PROCEDURE — 85025 COMPLETE CBC W/AUTO DIFF WBC: CPT

## 2024-11-23 PROCEDURE — 6360000004 HC RX CONTRAST MEDICATION: Performed by: RADIOLOGY

## 2024-11-23 PROCEDURE — 84484 ASSAY OF TROPONIN QUANT: CPT

## 2024-11-23 PROCEDURE — 6370000000 HC RX 637 (ALT 250 FOR IP): Performed by: STUDENT IN AN ORGANIZED HEALTH CARE EDUCATION/TRAINING PROGRAM

## 2024-11-23 PROCEDURE — 83735 ASSAY OF MAGNESIUM: CPT

## 2024-11-23 PROCEDURE — 94640 AIRWAY INHALATION TREATMENT: CPT

## 2024-11-23 PROCEDURE — 2060000000 HC ICU INTERMEDIATE R&B

## 2024-11-23 PROCEDURE — 71045 X-RAY EXAM CHEST 1 VIEW: CPT

## 2024-11-23 PROCEDURE — 5A0945A ASSISTANCE WITH RESPIRATORY VENTILATION, 24-96 CONSECUTIVE HOURS, HIGH NASAL FLOW/VELOCITY: ICD-10-PCS | Performed by: INTERNAL MEDICINE

## 2024-11-23 PROCEDURE — 87635 SARS-COV-2 COVID-19 AMP PRB: CPT

## 2024-11-23 PROCEDURE — 99223 1ST HOSP IP/OBS HIGH 75: CPT | Performed by: STUDENT IN AN ORGANIZED HEALTH CARE EDUCATION/TRAINING PROGRAM

## 2024-11-23 PROCEDURE — 96374 THER/PROPH/DIAG INJ IV PUSH: CPT

## 2024-11-23 PROCEDURE — 82805 BLOOD GASES W/O2 SATURATION: CPT

## 2024-11-23 PROCEDURE — 71275 CT ANGIOGRAPHY CHEST: CPT

## 2024-11-23 PROCEDURE — 99285 EMERGENCY DEPT VISIT HI MDM: CPT

## 2024-11-23 PROCEDURE — 83880 ASSAY OF NATRIURETIC PEPTIDE: CPT

## 2024-11-23 RX ORDER — SODIUM CHLORIDE 0.9 % (FLUSH) 0.9 %
5-40 SYRINGE (ML) INJECTION PRN
Status: DISCONTINUED | OUTPATIENT
Start: 2024-11-23 | End: 2024-11-27 | Stop reason: HOSPADM

## 2024-11-23 RX ORDER — BUDESONIDE 0.5 MG/2ML
500 INHALANT ORAL
Status: DISCONTINUED | OUTPATIENT
Start: 2024-11-23 | End: 2024-11-27 | Stop reason: HOSPADM

## 2024-11-23 RX ORDER — IPRATROPIUM BROMIDE AND ALBUTEROL SULFATE 2.5; .5 MG/3ML; MG/3ML
1 SOLUTION RESPIRATORY (INHALATION)
Status: DISCONTINUED | OUTPATIENT
Start: 2024-11-23 | End: 2024-11-27 | Stop reason: HOSPADM

## 2024-11-23 RX ORDER — METHYLPREDNISOLONE SODIUM SUCCINATE 125 MG/2ML
125 INJECTION INTRAMUSCULAR; INTRAVENOUS ONCE
Status: COMPLETED | OUTPATIENT
Start: 2024-11-23 | End: 2024-11-23

## 2024-11-23 RX ORDER — ACETAMINOPHEN 650 MG/1
650 SUPPOSITORY RECTAL EVERY 6 HOURS PRN
Status: DISCONTINUED | OUTPATIENT
Start: 2024-11-23 | End: 2024-11-27 | Stop reason: HOSPADM

## 2024-11-23 RX ORDER — SODIUM CHLORIDE 0.9 % (FLUSH) 0.9 %
5-40 SYRINGE (ML) INJECTION EVERY 12 HOURS SCHEDULED
Status: DISCONTINUED | OUTPATIENT
Start: 2024-11-23 | End: 2024-11-27 | Stop reason: HOSPADM

## 2024-11-23 RX ORDER — AZITHROMYCIN 250 MG/1
500 TABLET, FILM COATED ORAL DAILY
Status: DISCONTINUED | OUTPATIENT
Start: 2024-11-23 | End: 2024-11-27 | Stop reason: HOSPADM

## 2024-11-23 RX ORDER — PREDNISONE 20 MG/1
40 TABLET ORAL DAILY
Status: DISCONTINUED | OUTPATIENT
Start: 2024-11-26 | End: 2024-11-24

## 2024-11-23 RX ORDER — POLYETHYLENE GLYCOL 3350 17 G/17G
17 POWDER, FOR SOLUTION ORAL DAILY PRN
Status: DISCONTINUED | OUTPATIENT
Start: 2024-11-23 | End: 2024-11-27 | Stop reason: HOSPADM

## 2024-11-23 RX ORDER — BUMETANIDE 0.25 MG/ML
1 INJECTION, SOLUTION INTRAMUSCULAR; INTRAVENOUS 2 TIMES DAILY
Status: DISCONTINUED | OUTPATIENT
Start: 2024-11-24 | End: 2024-11-24

## 2024-11-23 RX ORDER — ENOXAPARIN SODIUM 100 MG/ML
40 INJECTION SUBCUTANEOUS DAILY
Status: DISCONTINUED | OUTPATIENT
Start: 2024-11-23 | End: 2024-11-27 | Stop reason: HOSPADM

## 2024-11-23 RX ORDER — OXYCODONE AND ACETAMINOPHEN 7.5; 325 MG/1; MG/1
1 TABLET ORAL 2 TIMES DAILY PRN
Status: DISCONTINUED | OUTPATIENT
Start: 2024-11-23 | End: 2024-11-23 | Stop reason: CLARIF

## 2024-11-23 RX ORDER — IOPAMIDOL 755 MG/ML
75 INJECTION, SOLUTION INTRAVASCULAR
Status: COMPLETED | OUTPATIENT
Start: 2024-11-23 | End: 2024-11-23

## 2024-11-23 RX ORDER — OXYCODONE HYDROCHLORIDE 5 MG/1
2.5 TABLET ORAL 2 TIMES DAILY PRN
Status: DISCONTINUED | OUTPATIENT
Start: 2024-11-23 | End: 2024-11-27 | Stop reason: HOSPADM

## 2024-11-23 RX ORDER — ONDANSETRON 2 MG/ML
4 INJECTION INTRAMUSCULAR; INTRAVENOUS EVERY 6 HOURS PRN
Status: DISCONTINUED | OUTPATIENT
Start: 2024-11-23 | End: 2024-11-27 | Stop reason: HOSPADM

## 2024-11-23 RX ORDER — ACETAMINOPHEN 325 MG/1
650 TABLET ORAL EVERY 6 HOURS PRN
Status: DISCONTINUED | OUTPATIENT
Start: 2024-11-23 | End: 2024-11-27 | Stop reason: HOSPADM

## 2024-11-23 RX ORDER — IPRATROPIUM BROMIDE AND ALBUTEROL SULFATE 2.5; .5 MG/3ML; MG/3ML
3 SOLUTION RESPIRATORY (INHALATION) ONCE
Status: COMPLETED | OUTPATIENT
Start: 2024-11-23 | End: 2024-11-23

## 2024-11-23 RX ORDER — METHYLPREDNISOLONE SODIUM SUCCINATE 40 MG/ML
40 INJECTION INTRAMUSCULAR; INTRAVENOUS EVERY 6 HOURS
Status: DISCONTINUED | OUTPATIENT
Start: 2024-11-24 | End: 2024-11-24

## 2024-11-23 RX ORDER — BUMETANIDE 0.25 MG/ML
1 INJECTION, SOLUTION INTRAMUSCULAR; INTRAVENOUS ONCE
Status: COMPLETED | OUTPATIENT
Start: 2024-11-23 | End: 2024-11-23

## 2024-11-23 RX ORDER — OXYCODONE AND ACETAMINOPHEN 5; 325 MG/1; MG/1
1 TABLET ORAL 2 TIMES DAILY PRN
Status: DISCONTINUED | OUTPATIENT
Start: 2024-11-23 | End: 2024-11-27 | Stop reason: HOSPADM

## 2024-11-23 RX ORDER — SODIUM CHLORIDE 9 MG/ML
INJECTION, SOLUTION INTRAVENOUS PRN
Status: DISCONTINUED | OUTPATIENT
Start: 2024-11-23 | End: 2024-11-27 | Stop reason: HOSPADM

## 2024-11-23 RX ORDER — ARFORMOTEROL TARTRATE 15 UG/2ML
15 SOLUTION RESPIRATORY (INHALATION)
Status: DISCONTINUED | OUTPATIENT
Start: 2024-11-23 | End: 2024-11-27 | Stop reason: HOSPADM

## 2024-11-23 RX ORDER — ONDANSETRON 4 MG/1
4 TABLET, ORALLY DISINTEGRATING ORAL EVERY 8 HOURS PRN
Status: DISCONTINUED | OUTPATIENT
Start: 2024-11-23 | End: 2024-11-27 | Stop reason: HOSPADM

## 2024-11-23 RX ADMIN — SODIUM CHLORIDE, PRESERVATIVE FREE 10 ML: 5 INJECTION INTRAVENOUS at 22:26

## 2024-11-23 RX ADMIN — BUMETANIDE 1 MG: 0.25 INJECTION INTRAMUSCULAR; INTRAVENOUS at 17:45

## 2024-11-23 RX ADMIN — IOPAMIDOL 75 ML: 755 INJECTION, SOLUTION INTRAVENOUS at 15:22

## 2024-11-23 RX ADMIN — ARFORMOTEROL TARTRATE 15 MCG: 15 SOLUTION RESPIRATORY (INHALATION) at 19:51

## 2024-11-23 RX ADMIN — BUDESONIDE 500 MCG: 0.5 SUSPENSION RESPIRATORY (INHALATION) at 19:51

## 2024-11-23 RX ADMIN — IPRATROPIUM BROMIDE AND ALBUTEROL SULFATE 3 DOSE: .5; 2.5 SOLUTION RESPIRATORY (INHALATION) at 12:50

## 2024-11-23 RX ADMIN — IPRATROPIUM BROMIDE AND ALBUTEROL SULFATE 1 DOSE: 2.5; .5 SOLUTION RESPIRATORY (INHALATION) at 19:51

## 2024-11-23 RX ADMIN — ENOXAPARIN SODIUM 40 MG: 100 INJECTION SUBCUTANEOUS at 22:25

## 2024-11-23 RX ADMIN — METHYLPREDNISOLONE SODIUM SUCCINATE 125 MG: 125 INJECTION INTRAMUSCULAR; INTRAVENOUS at 12:38

## 2024-11-23 ASSESSMENT — LIFESTYLE VARIABLES
HOW OFTEN DO YOU HAVE A DRINK CONTAINING ALCOHOL: NEVER
HOW MANY STANDARD DRINKS CONTAINING ALCOHOL DO YOU HAVE ON A TYPICAL DAY: 1 OR 2

## 2024-11-23 ASSESSMENT — PAIN - FUNCTIONAL ASSESSMENT
PAIN_FUNCTIONAL_ASSESSMENT: NONE - DENIES PAIN
PAIN_FUNCTIONAL_ASSESSMENT: NONE - DENIES PAIN

## 2024-11-23 NOTE — H&P
within the lung bases.  There is no appreciable right or left pleural effusion.     1. There are no radiographic findings of CHF 2. Advanced emphysematous changes with findings of chronic interstitial pulmonary fibrosis within the lungs more prominent within the lower lobes 3. Chronic pleuroparenchymal scarring seen within the right lung base laterally.       EKG:     ASSESSMENT:      Principal Problem:    COPD with acute exacerbation (HCC)  Active Problems:    Acute on chronic heart failure with preserved ejection fraction (HFpEF) (HCC)    Immunocompromised (HCC)    Acute on chronic respiratory failure with hypoxia    IgG deficiency (HCC)  Resolved Problems:    * No resolved hospital problems. *      PLAN:    Acute on chronic hypoxic respiratory failure- normally requires 7L NC at baseline, now requiting 8L NC. D-dimer 444, BNP 4665. No PE on CTA. Wean O2 as tolerated. Will order IV bumex. Azithromycin 500mg daily ordered.   COPD exacerbation- Solumedrol 40mg every 6 hrs and breathing treatments ordered. Follows with Dr. Little. Consult to pulmonology, appreciate recommendations.   Bilateral lower extremity swelling- will increase bumex dose to BID.   Hypokalemia- 3.3. monitor on daily BMP.   Acute on Chronic HFpEF- BNP 4665. Last EF 57% in October 2024. Monitor Is&Os and daily weights.    Chronic pain syndrome- continue home percocet.   IgG deficiency  History of mycobacterium avium complex     Code Status: Full  DVT prophylaxis: Lovenox    NOTE: This report was transcribed using voice recognition software. Every effort was made to ensure accuracy; however, inadvertent computerized transcription errors may be present.     Electronically signed by MIGUE Arredondo CNP on 11/23/2024 at 5:39 PM

## 2024-11-24 LAB
ANION GAP SERPL CALCULATED.3IONS-SCNC: 13 MMOL/L (ref 7–16)
BASOPHILS # BLD: 0 K/UL (ref 0–0.2)
BASOPHILS NFR BLD: 0 % (ref 0–2)
BUN SERPL-MCNC: 8 MG/DL (ref 6–23)
CALCIUM SERPL-MCNC: 8.8 MG/DL (ref 8.6–10.2)
CHLORIDE SERPL-SCNC: 101 MMOL/L (ref 98–107)
CO2 SERPL-SCNC: 25 MMOL/L (ref 22–29)
CREAT SERPL-MCNC: 0.7 MG/DL (ref 0.7–1.2)
EOSINOPHIL # BLD: 0 K/UL (ref 0.05–0.5)
EOSINOPHILS RELATIVE PERCENT: 0 % (ref 0–6)
ERYTHROCYTE [DISTWIDTH] IN BLOOD BY AUTOMATED COUNT: 15.3 % (ref 11.5–15)
GFR, ESTIMATED: >90 ML/MIN/1.73M2
GLUCOSE SERPL-MCNC: 149 MG/DL (ref 74–99)
HCT VFR BLD AUTO: 40.1 % (ref 37–54)
HGB BLD-MCNC: 13.7 G/DL (ref 12.5–16.5)
IMM GRANULOCYTES # BLD AUTO: <0.03 K/UL (ref 0–0.58)
IMM GRANULOCYTES NFR BLD: 0 % (ref 0–5)
LYMPHOCYTES NFR BLD: 0.31 K/UL (ref 1.5–4)
LYMPHOCYTES RELATIVE PERCENT: 5 % (ref 20–42)
MAGNESIUM SERPL-MCNC: 1.5 MG/DL (ref 1.6–2.6)
MCH RBC QN AUTO: 31.4 PG (ref 26–35)
MCHC RBC AUTO-ENTMCNC: 34.2 G/DL (ref 32–34.5)
MCV RBC AUTO: 92 FL (ref 80–99.9)
MONOCYTES NFR BLD: 0.15 K/UL (ref 0.1–0.95)
MONOCYTES NFR BLD: 2 % (ref 2–12)
NEUTROPHILS NFR BLD: 92 % (ref 43–80)
NEUTS SEG NFR BLD: 5.9 K/UL (ref 1.8–7.3)
PLATELET # BLD AUTO: 204 K/UL (ref 130–450)
PMV BLD AUTO: 10.1 FL (ref 7–12)
POTASSIUM SERPL-SCNC: 3.2 MMOL/L (ref 3.5–5)
RBC # BLD AUTO: 4.36 M/UL (ref 3.8–5.8)
RBC # BLD: NORMAL 10*6/UL
SODIUM SERPL-SCNC: 139 MMOL/L (ref 132–146)
WBC OTHER # BLD: 6.4 K/UL (ref 4.5–11.5)

## 2024-11-24 PROCEDURE — 2580000003 HC RX 258: Performed by: STUDENT IN AN ORGANIZED HEALTH CARE EDUCATION/TRAINING PROGRAM

## 2024-11-24 PROCEDURE — 2060000000 HC ICU INTERMEDIATE R&B

## 2024-11-24 PROCEDURE — 2700000000 HC OXYGEN THERAPY PER DAY

## 2024-11-24 PROCEDURE — 94640 AIRWAY INHALATION TREATMENT: CPT

## 2024-11-24 PROCEDURE — 99232 SBSQ HOSP IP/OBS MODERATE 35: CPT | Performed by: STUDENT IN AN ORGANIZED HEALTH CARE EDUCATION/TRAINING PROGRAM

## 2024-11-24 PROCEDURE — 83735 ASSAY OF MAGNESIUM: CPT

## 2024-11-24 PROCEDURE — 6370000000 HC RX 637 (ALT 250 FOR IP): Performed by: STUDENT IN AN ORGANIZED HEALTH CARE EDUCATION/TRAINING PROGRAM

## 2024-11-24 PROCEDURE — 6360000002 HC RX W HCPCS: Performed by: INTERNAL MEDICINE

## 2024-11-24 PROCEDURE — 6360000002 HC RX W HCPCS: Performed by: STUDENT IN AN ORGANIZED HEALTH CARE EDUCATION/TRAINING PROGRAM

## 2024-11-24 PROCEDURE — 80048 BASIC METABOLIC PNL TOTAL CA: CPT

## 2024-11-24 PROCEDURE — 85025 COMPLETE CBC W/AUTO DIFF WBC: CPT

## 2024-11-24 RX ORDER — POTASSIUM CHLORIDE 1500 MG/1
40 TABLET, EXTENDED RELEASE ORAL 2 TIMES DAILY WITH MEALS
Status: COMPLETED | OUTPATIENT
Start: 2024-11-24 | End: 2024-11-24

## 2024-11-24 RX ORDER — METHYLPREDNISOLONE SODIUM SUCCINATE 40 MG/ML
40 INJECTION INTRAMUSCULAR; INTRAVENOUS EVERY 8 HOURS
Status: COMPLETED | OUTPATIENT
Start: 2024-11-24 | End: 2024-11-24

## 2024-11-24 RX ORDER — MAGNESIUM SULFATE 1 G/100ML
1000 INJECTION INTRAVENOUS ONCE
Status: COMPLETED | OUTPATIENT
Start: 2024-11-24 | End: 2024-11-24

## 2024-11-24 RX ORDER — BUMETANIDE 1 MG/1
1 TABLET ORAL 2 TIMES DAILY
Status: COMPLETED | OUTPATIENT
Start: 2024-11-24 | End: 2024-11-25

## 2024-11-24 RX ADMIN — IPRATROPIUM BROMIDE AND ALBUTEROL SULFATE 1 DOSE: 2.5; .5 SOLUTION RESPIRATORY (INHALATION) at 08:51

## 2024-11-24 RX ADMIN — MAGNESIUM SULFATE HEPTAHYDRATE 1000 MG: 1 INJECTION, SOLUTION INTRAVENOUS at 09:13

## 2024-11-24 RX ADMIN — ENOXAPARIN SODIUM 40 MG: 100 INJECTION SUBCUTANEOUS at 09:12

## 2024-11-24 RX ADMIN — BUMETANIDE 1 MG: 1 TABLET ORAL at 17:09

## 2024-11-24 RX ADMIN — ARFORMOTEROL TARTRATE 15 MCG: 15 SOLUTION RESPIRATORY (INHALATION) at 08:51

## 2024-11-24 RX ADMIN — METHYLPREDNISOLONE SODIUM SUCCINATE 40 MG: 40 INJECTION INTRAMUSCULAR; INTRAVENOUS at 19:34

## 2024-11-24 RX ADMIN — POTASSIUM CHLORIDE 40 MEQ: 1500 TABLET, EXTENDED RELEASE ORAL at 17:09

## 2024-11-24 RX ADMIN — IPRATROPIUM BROMIDE AND ALBUTEROL SULFATE 1 DOSE: 2.5; .5 SOLUTION RESPIRATORY (INHALATION) at 12:46

## 2024-11-24 RX ADMIN — IPRATROPIUM BROMIDE AND ALBUTEROL SULFATE 1 DOSE: 2.5; .5 SOLUTION RESPIRATORY (INHALATION) at 16:00

## 2024-11-24 RX ADMIN — AZITHROMYCIN 500 MG: 250 TABLET, FILM COATED ORAL at 09:12

## 2024-11-24 RX ADMIN — METHYLPREDNISOLONE SODIUM SUCCINATE 40 MG: 40 INJECTION INTRAMUSCULAR; INTRAVENOUS at 14:08

## 2024-11-24 RX ADMIN — SODIUM CHLORIDE, PRESERVATIVE FREE 10 ML: 5 INJECTION INTRAVENOUS at 19:34

## 2024-11-24 RX ADMIN — METHYLPREDNISOLONE SODIUM SUCCINATE 40 MG: 40 INJECTION INTRAMUSCULAR; INTRAVENOUS at 11:43

## 2024-11-24 RX ADMIN — POTASSIUM CHLORIDE 40 MEQ: 1500 TABLET, EXTENDED RELEASE ORAL at 09:12

## 2024-11-24 RX ADMIN — BUMETANIDE 1 MG: 0.25 INJECTION INTRAMUSCULAR; INTRAVENOUS at 09:12

## 2024-11-24 RX ADMIN — METHYLPREDNISOLONE SODIUM SUCCINATE 40 MG: 40 INJECTION INTRAMUSCULAR; INTRAVENOUS at 04:15

## 2024-11-24 RX ADMIN — BUDESONIDE 500 MCG: 0.5 SUSPENSION RESPIRATORY (INHALATION) at 20:47

## 2024-11-24 RX ADMIN — SODIUM CHLORIDE, PRESERVATIVE FREE 10 ML: 5 INJECTION INTRAVENOUS at 09:14

## 2024-11-24 RX ADMIN — BUDESONIDE 500 MCG: 0.5 SUSPENSION RESPIRATORY (INHALATION) at 08:51

## 2024-11-24 RX ADMIN — IPRATROPIUM BROMIDE AND ALBUTEROL SULFATE 1 DOSE: 2.5; .5 SOLUTION RESPIRATORY (INHALATION) at 20:47

## 2024-11-24 RX ADMIN — ARFORMOTEROL TARTRATE 15 MCG: 15 SOLUTION RESPIRATORY (INHALATION) at 20:47

## 2024-11-24 NOTE — CONSULTS
at [facility] ENDOSCOPY    COLONOSCOPY  11/18/2013    COLONOSCOPY N/A 05/18/2023    COLONOSCOPY POLYPECTOMY SNARE/COLD BIOPSY performed by Frankie Portillo MD at [facility] ENDOSCOPY    HC INSERT PICC CATH, 5/> YRS  04/10/2021    REMOVED    LUMBAR FUSION  03/2019    [facility]       Current Medications:    Current Facility-Administered Medications: potassium chloride (KLOR-CON M) extended release tablet 40 mEq, 40 mEq, Oral, BID WC  sodium chloride flush 0.9 % injection 5-40 mL, 5-40 mL, IntraVENous, 2 times per day  sodium chloride flush 0.9 % injection 5-40 mL, 5-40 mL, IntraVENous, PRN  0.9 % sodium chloride infusion, , IntraVENous, PRN  ondansetron (ZOFRAN-ODT) disintegrating tablet 4 mg, 4 mg, Oral, Q8H PRN **OR** ondansetron (ZOFRAN) injection 4 mg, 4 mg, IntraVENous, Q6H PRN  polyethylene glycol (GLYCOLAX) packet 17 g, 17 g, Oral, Daily PRN  enoxaparin (LOVENOX) injection 40 mg, 40 mg, SubCUTAneous, Daily  acetaminophen (TYLENOL) tablet 650 mg, 650 mg, Oral, Q6H PRN **OR** acetaminophen (TYLENOL) suppository 650 mg, 650 mg, Rectal, Q6H PRN  methylPREDNISolone sodium succ (SOLU-MEDROL) injection 40 mg, 40 mg, IntraVENous, Q6H **FOLLOWED BY** [START ON 11/26/2024] predniSONE (DELTASONE) tablet 40 mg, 40 mg, Oral, Daily  ipratropium 0.5 mg-albuterol 2.5 mg (DUONEB) nebulizer solution 1 Dose, 1 Dose, Inhalation, 4x Daily RT  azithromycin (ZITHROMAX) tablet 500 mg, 500 mg, Oral, Daily  budesonide (PULMICORT) nebulizer suspension 500 mcg, 500 mcg, Nebulization, BID RT  bumetanide (BUMEX) injection 1 mg, 1 mg, IntraVENous, BID  arformoterol tartrate (BROVANA) nebulizer solution 15 mcg, 15 mcg, Nebulization, BID RT  oxyCODONE-acetaminophen (PERCOCET) 5-325 MG per tablet 1 tablet, 1 tablet, Oral, BID PRN **AND** oxyCODONE (ROXICODONE) immediate release tablet 2.5 mg, 2.5 mg, Oral, BID PRN    Allergies:  Patient has no known allergies.    Social History:    TOBACCO:   reports that he quit smoking about 8 years ago. His

## 2024-11-24 NOTE — PLAN OF CARE
Problem: Chronic Conditions and Co-morbidities  Goal: Patient's chronic conditions and co-morbidity symptoms are monitored and maintained or improved  Outcome: Progressing     Problem: Skin/Tissue Integrity  Goal: Absence of new skin breakdown  Description: 1.  Monitor for areas of redness and/or skin breakdown  2.  Assess vascular access sites hourly  3.  Every 4-6 hours minimum:  Change oxygen saturation probe site  4.  Every 4-6 hours:  If on nasal continuous positive airway pressure, respiratory therapy assess nares and determine need for appliance change or resting period.  Outcome: Progressing     Problem: Safety - Adult  Goal: Free from fall injury  Outcome: Progressing

## 2024-11-25 ENCOUNTER — CARE COORDINATION (OUTPATIENT)
Dept: CARE COORDINATION | Age: 74
End: 2024-11-25

## 2024-11-25 LAB
ANION GAP SERPL CALCULATED.3IONS-SCNC: 12 MMOL/L (ref 7–16)
BASOPHILS # BLD: 0.01 K/UL (ref 0–0.2)
BASOPHILS NFR BLD: 0 % (ref 0–2)
BUN SERPL-MCNC: 10 MG/DL (ref 6–23)
CALCIUM SERPL-MCNC: 9.6 MG/DL (ref 8.6–10.2)
CHLORIDE SERPL-SCNC: 99 MMOL/L (ref 98–107)
CO2 SERPL-SCNC: 27 MMOL/L (ref 22–29)
CREAT SERPL-MCNC: 0.7 MG/DL (ref 0.7–1.2)
EKG ATRIAL RATE: 82 BPM
EKG P AXIS: 57 DEGREES
EKG P-R INTERVAL: 184 MS
EKG Q-T INTERVAL: 366 MS
EKG QRS DURATION: 98 MS
EKG QTC CALCULATION (BAZETT): 427 MS
EKG R AXIS: 106 DEGREES
EKG T AXIS: -37 DEGREES
EKG VENTRICULAR RATE: 82 BPM
EOSINOPHIL # BLD: 0 K/UL (ref 0.05–0.5)
EOSINOPHILS RELATIVE PERCENT: 0 % (ref 0–6)
ERYTHROCYTE [DISTWIDTH] IN BLOOD BY AUTOMATED COUNT: 15.3 % (ref 11.5–15)
GFR, ESTIMATED: >90 ML/MIN/1.73M2
GLUCOSE SERPL-MCNC: 155 MG/DL (ref 74–99)
HCT VFR BLD AUTO: 47.7 % (ref 37–54)
HGB BLD-MCNC: 15.2 G/DL (ref 12.5–16.5)
IMM GRANULOCYTES # BLD AUTO: 0.06 K/UL (ref 0–0.58)
IMM GRANULOCYTES NFR BLD: 1 % (ref 0–5)
LYMPHOCYTES NFR BLD: 0.28 K/UL (ref 1.5–4)
LYMPHOCYTES RELATIVE PERCENT: 3 % (ref 20–42)
MCH RBC QN AUTO: 31.1 PG (ref 26–35)
MCHC RBC AUTO-ENTMCNC: 31.9 G/DL (ref 32–34.5)
MCV RBC AUTO: 97.7 FL (ref 80–99.9)
MONOCYTES NFR BLD: 0.38 K/UL (ref 0.1–0.95)
MONOCYTES NFR BLD: 4 % (ref 2–12)
NEUTROPHILS NFR BLD: 93 % (ref 43–80)
NEUTS SEG NFR BLD: 9.6 K/UL (ref 1.8–7.3)
PLATELET # BLD AUTO: 232 K/UL (ref 130–450)
PMV BLD AUTO: 10.1 FL (ref 7–12)
POTASSIUM SERPL-SCNC: 3.7 MMOL/L (ref 3.5–5)
RBC # BLD AUTO: 4.88 M/UL (ref 3.8–5.8)
SODIUM SERPL-SCNC: 138 MMOL/L (ref 132–146)
WBC OTHER # BLD: 10.3 K/UL (ref 4.5–11.5)

## 2024-11-25 PROCEDURE — 93010 ELECTROCARDIOGRAM REPORT: CPT | Performed by: INTERNAL MEDICINE

## 2024-11-25 PROCEDURE — 2060000000 HC ICU INTERMEDIATE R&B

## 2024-11-25 PROCEDURE — 6370000000 HC RX 637 (ALT 250 FOR IP): Performed by: INTERNAL MEDICINE

## 2024-11-25 PROCEDURE — 36415 COLL VENOUS BLD VENIPUNCTURE: CPT

## 2024-11-25 PROCEDURE — 85025 COMPLETE CBC W/AUTO DIFF WBC: CPT

## 2024-11-25 PROCEDURE — 94640 AIRWAY INHALATION TREATMENT: CPT

## 2024-11-25 PROCEDURE — 2700000000 HC OXYGEN THERAPY PER DAY

## 2024-11-25 PROCEDURE — 99232 SBSQ HOSP IP/OBS MODERATE 35: CPT | Performed by: STUDENT IN AN ORGANIZED HEALTH CARE EDUCATION/TRAINING PROGRAM

## 2024-11-25 PROCEDURE — 6360000002 HC RX W HCPCS: Performed by: STUDENT IN AN ORGANIZED HEALTH CARE EDUCATION/TRAINING PROGRAM

## 2024-11-25 PROCEDURE — 80048 BASIC METABOLIC PNL TOTAL CA: CPT

## 2024-11-25 PROCEDURE — 6370000000 HC RX 637 (ALT 250 FOR IP): Performed by: STUDENT IN AN ORGANIZED HEALTH CARE EDUCATION/TRAINING PROGRAM

## 2024-11-25 PROCEDURE — 2580000003 HC RX 258: Performed by: STUDENT IN AN ORGANIZED HEALTH CARE EDUCATION/TRAINING PROGRAM

## 2024-11-25 RX ORDER — BUMETANIDE 1 MG/1
0.5 TABLET ORAL
Status: DISCONTINUED | OUTPATIENT
Start: 2024-11-26 | End: 2024-11-27 | Stop reason: HOSPADM

## 2024-11-25 RX ORDER — BUMETANIDE 1 MG/1
1 TABLET ORAL
Status: DISCONTINUED | OUTPATIENT
Start: 2024-11-27 | End: 2024-11-27 | Stop reason: HOSPADM

## 2024-11-25 RX ADMIN — IPRATROPIUM BROMIDE AND ALBUTEROL SULFATE 1 DOSE: 2.5; .5 SOLUTION RESPIRATORY (INHALATION) at 16:38

## 2024-11-25 RX ADMIN — ARFORMOTEROL TARTRATE 15 MCG: 15 SOLUTION RESPIRATORY (INHALATION) at 08:55

## 2024-11-25 RX ADMIN — IPRATROPIUM BROMIDE AND ALBUTEROL SULFATE 1 DOSE: 2.5; .5 SOLUTION RESPIRATORY (INHALATION) at 13:03

## 2024-11-25 RX ADMIN — BUDESONIDE 500 MCG: 0.5 SUSPENSION RESPIRATORY (INHALATION) at 20:53

## 2024-11-25 RX ADMIN — BUMETANIDE 1 MG: 1 TABLET ORAL at 08:30

## 2024-11-25 RX ADMIN — BUDESONIDE 500 MCG: 0.5 SUSPENSION RESPIRATORY (INHALATION) at 08:55

## 2024-11-25 RX ADMIN — AZITHROMYCIN 500 MG: 250 TABLET, FILM COATED ORAL at 08:29

## 2024-11-25 RX ADMIN — BUMETANIDE 1 MG: 1 TABLET ORAL at 17:53

## 2024-11-25 RX ADMIN — IPRATROPIUM BROMIDE AND ALBUTEROL SULFATE 1 DOSE: 2.5; .5 SOLUTION RESPIRATORY (INHALATION) at 20:53

## 2024-11-25 RX ADMIN — IPRATROPIUM BROMIDE AND ALBUTEROL SULFATE 1 DOSE: 2.5; .5 SOLUTION RESPIRATORY (INHALATION) at 08:55

## 2024-11-25 RX ADMIN — SODIUM CHLORIDE, PRESERVATIVE FREE 10 ML: 5 INJECTION INTRAVENOUS at 20:16

## 2024-11-25 RX ADMIN — PREDNISONE 30 MG: 5 TABLET ORAL at 08:29

## 2024-11-25 RX ADMIN — ENOXAPARIN SODIUM 40 MG: 100 INJECTION SUBCUTANEOUS at 08:30

## 2024-11-25 RX ADMIN — ARFORMOTEROL TARTRATE 15 MCG: 15 SOLUTION RESPIRATORY (INHALATION) at 20:53

## 2024-11-25 RX ADMIN — SODIUM CHLORIDE, PRESERVATIVE FREE 10 ML: 5 INJECTION INTRAVENOUS at 08:30

## 2024-11-25 ASSESSMENT — PAIN SCALES - GENERAL: PAINLEVEL_OUTOF10: 0

## 2024-11-25 NOTE — CARE COORDINATION
Social Work / Discharge Planning : SW met with patient and explained role as discharge planner / transition of care. Patient verified plan is HOME with spouse . Patient is independent. Spouse can transport.   Patient admitted with COPD.Patient currently on 10L HFNC. Patient  states   baseline 6-7L O2 nc home supplier is [employer]. Concentrator 10L flow and an [employer] concentrator. Patient also has a nebulizer he uses twice a day and a chest vest. Patient follows with Dr. Little . [facility] plan. PPatient is established with Dr. Loyd and uses [facility] pharmacy      Patient denies any HOME needs. SW to follow. Electronically signed by MALVIN Giles on 11/25/24 at 10:04 AM EST

## 2024-11-25 NOTE — PLAN OF CARE
Problem: Chronic Conditions and Co-morbidities  Goal: Patient's chronic conditions and co-morbidity symptoms are monitored and maintained or improved  Outcome: Progressing     Problem: Skin/Tissue Integrity  Goal: Absence of new skin breakdown  Description: 1.  Monitor for areas of redness and/or skin breakdown  2.  Assess vascular access sites hourly  3.  Every 4-6 hours minimum:  Change oxygen saturation probe site  4.  Every 4-6 hours:  If on nasal continuous positive airway pressure, respiratory therapy assess nares and determine need for appliance change or resting period.  Outcome: Progressing     Problem: Safety - Adult  Goal: Free from fall injury  Outcome: Progressing  Flowsheets (Taken 11/25/2024 6106)  Free From Fall Injury: Instruct family/caregiver on patient safety

## 2024-11-25 NOTE — ACP (ADVANCE CARE PLANNING)
Advance Care Planning   Healthcare Decision Maker:    Primary Decision Maker: Tara Cox CLARENCE - Spouse - [phone]    Click here to complete Healthcare Decision Makers including selection of the Healthcare Decision Maker Relationship (ie \"Primary\").  Today we documented Decision Maker(s) consistent with Legal Next of Kin hierarchy.

## 2024-11-25 NOTE — CARE COORDINATION
Pt went to ER and admitted on 11/23/24, Room [address] [place] [address]. Will continue to follow once he returns home.

## 2024-11-26 LAB
ANION GAP SERPL CALCULATED.3IONS-SCNC: 11 MMOL/L (ref 7–16)
BASOPHILS # BLD: 0.01 K/UL (ref 0–0.2)
BASOPHILS NFR BLD: 0 % (ref 0–2)
BUN SERPL-MCNC: 19 MG/DL (ref 6–23)
CALCIUM SERPL-MCNC: 9 MG/DL (ref 8.6–10.2)
CHLORIDE SERPL-SCNC: 100 MMOL/L (ref 98–107)
CO2 SERPL-SCNC: 27 MMOL/L (ref 22–29)
CREAT SERPL-MCNC: 0.9 MG/DL (ref 0.7–1.2)
EOSINOPHIL # BLD: 0.01 K/UL (ref 0.05–0.5)
EOSINOPHILS RELATIVE PERCENT: 0 % (ref 0–6)
ERYTHROCYTE [DISTWIDTH] IN BLOOD BY AUTOMATED COUNT: 15.2 % (ref 11.5–15)
GFR, ESTIMATED: >90 ML/MIN/1.73M2
GLUCOSE SERPL-MCNC: 83 MG/DL (ref 74–99)
HCT VFR BLD AUTO: 44.2 % (ref 37–54)
HGB BLD-MCNC: 14.5 G/DL (ref 12.5–16.5)
IMM GRANULOCYTES # BLD AUTO: 0.03 K/UL (ref 0–0.58)
IMM GRANULOCYTES NFR BLD: 0 % (ref 0–5)
LYMPHOCYTES NFR BLD: 1.38 K/UL (ref 1.5–4)
LYMPHOCYTES RELATIVE PERCENT: 14 % (ref 20–42)
MAGNESIUM SERPL-MCNC: 1.8 MG/DL (ref 1.6–2.6)
MCH RBC QN AUTO: 31.3 PG (ref 26–35)
MCHC RBC AUTO-ENTMCNC: 32.8 G/DL (ref 32–34.5)
MCV RBC AUTO: 95.5 FL (ref 80–99.9)
MONOCYTES NFR BLD: 0.65 K/UL (ref 0.1–0.95)
MONOCYTES NFR BLD: 7 % (ref 2–12)
NEUTROPHILS NFR BLD: 79 % (ref 43–80)
NEUTS SEG NFR BLD: 7.71 K/UL (ref 1.8–7.3)
PLATELET # BLD AUTO: 214 K/UL (ref 130–450)
PMV BLD AUTO: 10.2 FL (ref 7–12)
POTASSIUM SERPL-SCNC: 3.4 MMOL/L (ref 3.5–5)
RBC # BLD AUTO: 4.63 M/UL (ref 3.8–5.8)
SODIUM SERPL-SCNC: 138 MMOL/L (ref 132–146)
WBC OTHER # BLD: 9.8 K/UL (ref 4.5–11.5)

## 2024-11-26 PROCEDURE — 6370000000 HC RX 637 (ALT 250 FOR IP): Performed by: STUDENT IN AN ORGANIZED HEALTH CARE EDUCATION/TRAINING PROGRAM

## 2024-11-26 PROCEDURE — 83735 ASSAY OF MAGNESIUM: CPT

## 2024-11-26 PROCEDURE — 36415 COLL VENOUS BLD VENIPUNCTURE: CPT

## 2024-11-26 PROCEDURE — 2060000000 HC ICU INTERMEDIATE R&B

## 2024-11-26 PROCEDURE — 6370000000 HC RX 637 (ALT 250 FOR IP): Performed by: INTERNAL MEDICINE

## 2024-11-26 PROCEDURE — 99232 SBSQ HOSP IP/OBS MODERATE 35: CPT | Performed by: STUDENT IN AN ORGANIZED HEALTH CARE EDUCATION/TRAINING PROGRAM

## 2024-11-26 PROCEDURE — 6360000002 HC RX W HCPCS: Performed by: STUDENT IN AN ORGANIZED HEALTH CARE EDUCATION/TRAINING PROGRAM

## 2024-11-26 PROCEDURE — 2700000000 HC OXYGEN THERAPY PER DAY

## 2024-11-26 PROCEDURE — 85025 COMPLETE CBC W/AUTO DIFF WBC: CPT

## 2024-11-26 PROCEDURE — 94640 AIRWAY INHALATION TREATMENT: CPT

## 2024-11-26 PROCEDURE — 80048 BASIC METABOLIC PNL TOTAL CA: CPT

## 2024-11-26 PROCEDURE — 2580000003 HC RX 258: Performed by: STUDENT IN AN ORGANIZED HEALTH CARE EDUCATION/TRAINING PROGRAM

## 2024-11-26 RX ORDER — PREDNISONE 10 MG/1
30 TABLET ORAL DAILY
Qty: 30 TABLET | Refills: 0 | Status: SHIPPED | OUTPATIENT
Start: 2024-11-27 | End: 2024-12-07

## 2024-11-26 RX ORDER — POTASSIUM CHLORIDE 1500 MG/1
20 TABLET, EXTENDED RELEASE ORAL DAILY
Qty: 30 TABLET | Refills: 0 | Status: SHIPPED | OUTPATIENT
Start: 2024-11-26

## 2024-11-26 RX ORDER — BUMETANIDE 1 MG/1
1 TABLET ORAL DAILY
Qty: 30 TABLET | Refills: 0 | Status: SHIPPED | OUTPATIENT
Start: 2024-11-26

## 2024-11-26 RX ADMIN — PREDNISONE 30 MG: 5 TABLET ORAL at 08:35

## 2024-11-26 RX ADMIN — BUDESONIDE 500 MCG: 0.5 SUSPENSION RESPIRATORY (INHALATION) at 08:31

## 2024-11-26 RX ADMIN — BUMETANIDE 0.5 MG: 1 TABLET ORAL at 08:35

## 2024-11-26 RX ADMIN — ARFORMOTEROL TARTRATE 15 MCG: 15 SOLUTION RESPIRATORY (INHALATION) at 21:09

## 2024-11-26 RX ADMIN — IPRATROPIUM BROMIDE AND ALBUTEROL SULFATE 1 DOSE: 2.5; .5 SOLUTION RESPIRATORY (INHALATION) at 12:56

## 2024-11-26 RX ADMIN — SODIUM CHLORIDE, PRESERVATIVE FREE 10 ML: 5 INJECTION INTRAVENOUS at 19:30

## 2024-11-26 RX ADMIN — BUDESONIDE 500 MCG: 0.5 SUSPENSION RESPIRATORY (INHALATION) at 21:09

## 2024-11-26 RX ADMIN — SODIUM CHLORIDE, PRESERVATIVE FREE 10 ML: 5 INJECTION INTRAVENOUS at 08:38

## 2024-11-26 RX ADMIN — IPRATROPIUM BROMIDE AND ALBUTEROL SULFATE 1 DOSE: 2.5; .5 SOLUTION RESPIRATORY (INHALATION) at 15:46

## 2024-11-26 RX ADMIN — IPRATROPIUM BROMIDE AND ALBUTEROL SULFATE 1 DOSE: 2.5; .5 SOLUTION RESPIRATORY (INHALATION) at 21:09

## 2024-11-26 RX ADMIN — IPRATROPIUM BROMIDE AND ALBUTEROL SULFATE 1 DOSE: 2.5; .5 SOLUTION RESPIRATORY (INHALATION) at 08:31

## 2024-11-26 RX ADMIN — ENOXAPARIN SODIUM 40 MG: 100 INJECTION SUBCUTANEOUS at 08:35

## 2024-11-26 RX ADMIN — ARFORMOTEROL TARTRATE 15 MCG: 15 SOLUTION RESPIRATORY (INHALATION) at 08:31

## 2024-11-26 RX ADMIN — AZITHROMYCIN 500 MG: 250 TABLET, FILM COATED ORAL at 08:35

## 2024-11-26 ASSESSMENT — PAIN SCALES - GENERAL
PAINLEVEL_OUTOF10: 0

## 2024-11-26 NOTE — DISCHARGE SUMMARY
[facility] Hospitalist Physician Discharge Summary       Juni Loyd MD  [address] [facility] [place]  [address]  [place] [id number]  [phone]    Schedule an appointment as soon as possible for a visit in 7 day(s)  Call to make a HOSPITAL FOLLOW UP appointment Rolf Radford MD  [address] [facility]  [address]  [facility] [address]  [phone]    Call in 1 week(s)        Activity level: as tolerated    Dispo: home      Condition on discharge: stable    Patient ID:  Jason Cox  [id number]  74 y.o.  1950    Admit date: 11/23/2024    Discharge date and time:  11/27/2024  3:51 PM    Admission Diagnoses: Principal Problem:    COPD with acute exacerbation (HCC)  Active Problems:    Acute on chronic heart failure with preserved ejection fraction (HFpEF) (HCC)    Immunocompromised (HCC)    Acute on chronic respiratory failure with hypoxia    IgG deficiency (HCC)  Resolved Problems:    * No resolved hospital problems. *      Discharge Diagnoses: Principal Problem:    COPD with acute exacerbation (HCC)  Active Problems:    Acute on chronic heart failure with preserved ejection fraction (HFpEF) (HCC)    Immunocompromised (HCC)    Acute on chronic respiratory failure with hypoxia    IgG deficiency (HCC)  Resolved Problems:    * No resolved hospital problems. *      Consults:  IP CONSULT TO INTERNAL MEDICINE  IP CONSULT TO PULMONOLOGY    Procedures: None    Hospital Course:   Patient Jason Cox is a 74 y.o. presented with Dyspnea and respiratory abnormalities [R06.00, R06.89]  COPD exacerbation (HCC) [J44.1]  COPD with acute exacerbation (HCC) [J44.1]  Pulmonary emphysema, unspecified emphysema type (HCC) [J43.9]  Acute on chronic congestive heart failure, unspecified heart failure type (HCC) [I50.9]  Acute on chronic hypoxic respiratory failure [J96.21]    Brief summary:  Patient presented with acute on chronic hypoxic resp failure presumed r/t CHFe and suspected COPDe. Seen by Pulm, weaned to

## 2024-11-26 NOTE — PLAN OF CARE
Problem: Chronic Conditions and Co-morbidities  Goal: Patient's chronic conditions and co-morbidity symptoms are monitored and maintained or improved  11/25/2024 2134 by Theresa Thornton RN  Outcome: Progressing  11/25/2024 1019 by Zoya Jimenez RN  Outcome: Progressing     Problem: Skin/Tissue Integrity  Goal: Absence of new skin breakdown  Description: 1.  Monitor for areas of redness and/or skin breakdown  2.  Assess vascular access sites hourly  3.  Every 4-6 hours minimum:  Change oxygen saturation probe site  4.  Every 4-6 hours:  If on nasal continuous positive airway pressure, respiratory therapy assess nares and determine need for appliance change or resting period.  11/25/2024 2134 by Theresa Thornton RN  Outcome: Progressing  11/25/2024 1019 by Zoya Jimenez RN  Outcome: Progressing     Problem: Safety - Adult  Goal: Free from fall injury  11/25/2024 2134 by Theresa Thornton RN  Outcome: Progressing  Flowsheets (Taken 11/25/2024 2015)  Free From Fall Injury: Instruct family/caregiver on patient safety  11/25/2024 1019 by Zoya Jimenez RN  Outcome: Progressing  Flowsheets (Taken 11/25/2024 0830)  Free From Fall Injury: Instruct family/caregiver on patient safety     Problem: ABCDS Injury Assessment  Goal: Absence of physical injury  Outcome: Progressing  Flowsheets (Taken 11/25/2024 2015)  Absence of Physical Injury: Implement safety measures based on patient assessment

## 2024-11-27 VITALS
TEMPERATURE: 97.5 F | HEART RATE: 93 BPM | DIASTOLIC BLOOD PRESSURE: 71 MMHG | BODY MASS INDEX: 23.65 KG/M2 | OXYGEN SATURATION: 92 % | SYSTOLIC BLOOD PRESSURE: 106 MMHG | HEIGHT: 72 IN | RESPIRATION RATE: 24 BRPM | WEIGHT: 174.6 LBS

## 2024-11-27 LAB
ANION GAP SERPL CALCULATED.3IONS-SCNC: 10 MMOL/L (ref 7–16)
BASOPHILS # BLD: 0.01 K/UL (ref 0–0.2)
BASOPHILS NFR BLD: 0 % (ref 0–2)
BUN SERPL-MCNC: 17 MG/DL (ref 6–23)
CALCIUM SERPL-MCNC: 8.7 MG/DL (ref 8.6–10.2)
CHLORIDE SERPL-SCNC: 101 MMOL/L (ref 98–107)
CO2 SERPL-SCNC: 27 MMOL/L (ref 22–29)
CREAT SERPL-MCNC: 0.7 MG/DL (ref 0.7–1.2)
EOSINOPHIL # BLD: 0.04 K/UL (ref 0.05–0.5)
EOSINOPHILS RELATIVE PERCENT: 1 % (ref 0–6)
ERYTHROCYTE [DISTWIDTH] IN BLOOD BY AUTOMATED COUNT: 14.9 % (ref 11.5–15)
GFR, ESTIMATED: >90 ML/MIN/1.73M2
GLUCOSE SERPL-MCNC: 85 MG/DL (ref 74–99)
HCT VFR BLD AUTO: 46.8 % (ref 37–54)
HGB BLD-MCNC: 15.1 G/DL (ref 12.5–16.5)
IMM GRANULOCYTES # BLD AUTO: 0.03 K/UL (ref 0–0.58)
IMM GRANULOCYTES NFR BLD: 0 % (ref 0–5)
LYMPHOCYTES NFR BLD: 1.38 K/UL (ref 1.5–4)
LYMPHOCYTES RELATIVE PERCENT: 20 % (ref 20–42)
MAGNESIUM SERPL-MCNC: 2 MG/DL (ref 1.6–2.6)
MCH RBC QN AUTO: 31.3 PG (ref 26–35)
MCHC RBC AUTO-ENTMCNC: 32.3 G/DL (ref 32–34.5)
MCV RBC AUTO: 97.1 FL (ref 80–99.9)
MICROORGANISM SPEC CULT: NORMAL
MICROORGANISM/AGENT SPEC: NORMAL
MONOCYTES NFR BLD: 0.55 K/UL (ref 0.1–0.95)
MONOCYTES NFR BLD: 8 % (ref 2–12)
NEUTROPHILS NFR BLD: 71 % (ref 43–80)
NEUTS SEG NFR BLD: 4.88 K/UL (ref 1.8–7.3)
PLATELET # BLD AUTO: 193 K/UL (ref 130–450)
PMV BLD AUTO: 10.5 FL (ref 7–12)
POTASSIUM SERPL-SCNC: 3.4 MMOL/L (ref 3.5–5)
RBC # BLD AUTO: 4.82 M/UL (ref 3.8–5.8)
SERVICE CMNT-IMP: NORMAL
SODIUM SERPL-SCNC: 138 MMOL/L (ref 132–146)
SPECIMEN DESCRIPTION: NORMAL
WBC OTHER # BLD: 6.9 K/UL (ref 4.5–11.5)

## 2024-11-27 PROCEDURE — 2580000003 HC RX 258: Performed by: STUDENT IN AN ORGANIZED HEALTH CARE EDUCATION/TRAINING PROGRAM

## 2024-11-27 PROCEDURE — 6370000000 HC RX 637 (ALT 250 FOR IP): Performed by: STUDENT IN AN ORGANIZED HEALTH CARE EDUCATION/TRAINING PROGRAM

## 2024-11-27 PROCEDURE — 94640 AIRWAY INHALATION TREATMENT: CPT

## 2024-11-27 PROCEDURE — 2700000000 HC OXYGEN THERAPY PER DAY

## 2024-11-27 PROCEDURE — 99239 HOSP IP/OBS DSCHRG MGMT >30: CPT | Performed by: STUDENT IN AN ORGANIZED HEALTH CARE EDUCATION/TRAINING PROGRAM

## 2024-11-27 PROCEDURE — 85025 COMPLETE CBC W/AUTO DIFF WBC: CPT

## 2024-11-27 PROCEDURE — 6360000002 HC RX W HCPCS: Performed by: STUDENT IN AN ORGANIZED HEALTH CARE EDUCATION/TRAINING PROGRAM

## 2024-11-27 PROCEDURE — 36415 COLL VENOUS BLD VENIPUNCTURE: CPT

## 2024-11-27 PROCEDURE — 80048 BASIC METABOLIC PNL TOTAL CA: CPT

## 2024-11-27 PROCEDURE — 6370000000 HC RX 637 (ALT 250 FOR IP): Performed by: INTERNAL MEDICINE

## 2024-11-27 PROCEDURE — 83735 ASSAY OF MAGNESIUM: CPT

## 2024-11-27 RX ORDER — PREDNISONE 20 MG/1
20 TABLET ORAL
Status: DISCONTINUED | OUTPATIENT
Start: 2024-11-28 | End: 2024-11-27 | Stop reason: HOSPADM

## 2024-11-27 RX ADMIN — AZITHROMYCIN 500 MG: 250 TABLET, FILM COATED ORAL at 08:10

## 2024-11-27 RX ADMIN — BUMETANIDE 1 MG: 1 TABLET ORAL at 08:09

## 2024-11-27 RX ADMIN — ENOXAPARIN SODIUM 40 MG: 100 INJECTION SUBCUTANEOUS at 08:09

## 2024-11-27 RX ADMIN — SODIUM CHLORIDE, PRESERVATIVE FREE 10 ML: 5 INJECTION INTRAVENOUS at 08:10

## 2024-11-27 RX ADMIN — IPRATROPIUM BROMIDE AND ALBUTEROL SULFATE 1 DOSE: 2.5; .5 SOLUTION RESPIRATORY (INHALATION) at 09:00

## 2024-11-27 RX ADMIN — BUDESONIDE 500 MCG: 0.5 SUSPENSION RESPIRATORY (INHALATION) at 09:00

## 2024-11-27 RX ADMIN — ARFORMOTEROL TARTRATE 15 MCG: 15 SOLUTION RESPIRATORY (INHALATION) at 09:00

## 2024-11-27 RX ADMIN — PREDNISONE 30 MG: 5 TABLET ORAL at 08:10

## 2024-11-27 NOTE — PLAN OF CARE
Problem: Chronic Conditions and Co-morbidities  Goal: Patient's chronic conditions and co-morbidity symptoms are monitored and maintained or improved  11/26/2024 2137 by Theresa Thornton RN  Outcome: Progressing  Flowsheets (Taken 11/26/2024 1930)  Care Plan - Patient's Chronic Conditions and Co-Morbidity Symptoms are Monitored and Maintained or Improved:   Collaborate with multidisciplinary team to address chronic and comorbid conditions and prevent exacerbation or deterioration   Monitor and assess patient's chronic conditions and comorbid symptoms for stability, deterioration, or improvement   Update acute care plan with appropriate goals if chronic or comorbid symptoms are exacerbated and prevent overall improvement and discharge  11/26/2024 0952 by Estephanie Cardoza RN  Outcome: Progressing  Flowsheets (Taken 11/26/2024 0831)  Care Plan - Patient's Chronic Conditions and Co-Morbidity Symptoms are Monitored and Maintained or Improved:   Monitor and assess patient's chronic conditions and comorbid symptoms for stability, deterioration, or improvement   Collaborate with multidisciplinary team to address chronic and comorbid conditions and prevent exacerbation or deterioration   Update acute care plan with appropriate goals if chronic or comorbid symptoms are exacerbated and prevent overall improvement and discharge     Problem: Skin/Tissue Integrity  Goal: Absence of new skin breakdown  Description: 1.  Monitor for areas of redness and/or skin breakdown  2.  Assess vascular access sites hourly  3.  Every 4-6 hours minimum:  Change oxygen saturation probe site  4.  Every 4-6 hours:  If on nasal continuous positive airway pressure, respiratory therapy assess nares and determine need for appliance change or resting period.  11/26/2024 2137 by Theresa Thornton, RN  Outcome: Progressing  11/26/2024 0952 by Estpehanie Cardoza RN  Outcome: Progressing     Problem: Safety - Adult  Goal: Free from fall injury  11/26/2024

## 2024-11-27 NOTE — PROGRESS NOTES
[facility] Hospitalist Progress Note    Admitting Date and Time: 11/23/2024 11:12 AM  Admit Dx: Dyspnea and respiratory abnormalities [R06.00, R06.89]  COPD exacerbation (HCC) [J44.1]  COPD with acute exacerbation (HCC) [J44.1]  Pulmonary emphysema, unspecified emphysema type (HCC) [J43.9]  Acute on chronic congestive heart failure, unspecified heart failure type (HCC) [I50.9]  Acute on chronic hypoxic respiratory failure [J96.21]    Subjective:  Patient is being followed for Dyspnea and respiratory abnormalities [R06.00, R06.89]  COPD exacerbation (HCC) [J44.1]  COPD with acute exacerbation (HCC) [J44.1]  Pulmonary emphysema, unspecified emphysema type (HCC) [J43.9]  Acute on chronic congestive heart failure, unspecified heart failure type (HCC) [I50.9]  Acute on chronic hypoxic respiratory failure [J96.21]   Pt feels okay  Per RN: no additional concerns    ROS: denies fever, chills, cp, sob, n/v, HA unless otherwise noted above     [START ON 11/27/2024] bumetanide  1 mg Oral Once per day on Monday Wednesday Friday    bumetanide  0.5 mg Oral Once per day on Sunday Tuesday Thursday Saturday    predniSONE  30 mg Oral Daily    sodium chloride flush  5-40 mL IntraVENous 2 times per day    enoxaparin  40 mg SubCUTAneous Daily    ipratropium 0.5 mg-albuterol 2.5 mg  1 Dose Inhalation 4x Daily RT    azithromycin  500 mg Oral Daily    budesonide  500 mcg Nebulization BID RT    arformoterol tartrate  15 mcg Nebulization BID RT     sodium chloride flush, 5-40 mL, PRN  sodium chloride, , PRN  ondansetron, 4 mg, Q8H PRN   Or  ondansetron, 4 mg, Q6H PRN  polyethylene glycol, 17 g, Daily PRN  acetaminophen, 650 mg, Q6H PRN   Or  acetaminophen, 650 mg, Q6H PRN  oxyCODONE-acetaminophen, 1 tablet, BID PRN   And  oxyCODONE, 2.5 mg, BID PRN         Objective:  /73   Pulse 89   Temp 98.6 °F (37 °C) (Oral)   Resp 20   Ht 1.829 m (6')   Wt 78.9 kg (173 lb 14.4 oz)   SpO2 92%   BMI 23.59 kg/m²     General 
       [facility] Hospitalist Progress Note    Admitting Date and Time: 11/23/2024 11:12 AM  Admit Dx: Dyspnea and respiratory abnormalities [R06.00, R06.89]  COPD exacerbation (HCC) [J44.1]  COPD with acute exacerbation (HCC) [J44.1]  Pulmonary emphysema, unspecified emphysema type (HCC) [J43.9]  Acute on chronic congestive heart failure, unspecified heart failure type (HCC) [I50.9]  Acute on chronic hypoxic respiratory failure [J96.21]    Subjective:  Patient is being followed for Dyspnea and respiratory abnormalities [R06.00, R06.89]  COPD exacerbation (HCC) [J44.1]  COPD with acute exacerbation (HCC) [J44.1]  Pulmonary emphysema, unspecified emphysema type (HCC) [J43.9]  Acute on chronic congestive heart failure, unspecified heart failure type (HCC) [I50.9]  Acute on chronic hypoxic respiratory failure [J96.21]   Pt feels okay  Per RN: no additional concerns    ROS: denies fever, chills, cp, sob, n/v, HA unless otherwise noted above     potassium chloride  40 mEq Oral BID WC    methylPREDNISolone  40 mg IntraVENous Q8H    Followed by    [START ON 11/25/2024] predniSONE  30 mg Oral Daily    sodium chloride flush  5-40 mL IntraVENous 2 times per day    enoxaparin  40 mg SubCUTAneous Daily    ipratropium 0.5 mg-albuterol 2.5 mg  1 Dose Inhalation 4x Daily RT    azithromycin  500 mg Oral Daily    budesonide  500 mcg Nebulization BID RT    bumetanide  1 mg IntraVENous BID    arformoterol tartrate  15 mcg Nebulization BID RT     sodium chloride flush, 5-40 mL, PRN  sodium chloride, , PRN  ondansetron, 4 mg, Q8H PRN   Or  ondansetron, 4 mg, Q6H PRN  polyethylene glycol, 17 g, Daily PRN  acetaminophen, 650 mg, Q6H PRN   Or  acetaminophen, 650 mg, Q6H PRN  oxyCODONE-acetaminophen, 1 tablet, BID PRN   And  oxyCODONE, 2.5 mg, BID PRN         Objective:  /64   Pulse 92   Temp 97.8 °F (36.6 °C) (Oral)   Resp 22   Ht 1.829 m (6')   Wt 85.4 kg (188 lb 3 oz)   SpO2 93%   BMI 25.52 kg/m²     General Appearance: 
  [facility]  [address], [address]  [place] [address]      Pulmonary Progress Note      SUBJECTIVE:  claims doing better with breathing, on 9 li HFNC, not much cough/congestion, diuresing well    OBJECTIVE    Medications    Continuous Infusions:   sodium chloride         Scheduled Meds:   predniSONE  30 mg Oral Daily    bumetanide  1 mg Oral BID    sodium chloride flush  5-40 mL IntraVENous 2 times per day    enoxaparin  40 mg SubCUTAneous Daily    ipratropium 0.5 mg-albuterol 2.5 mg  1 Dose Inhalation 4x Daily RT    azithromycin  500 mg Oral Daily    budesonide  500 mcg Nebulization BID RT    arformoterol tartrate  15 mcg Nebulization BID RT       PRN Meds:sodium chloride flush, sodium chloride, ondansetron **OR** ondansetron, polyethylene glycol, acetaminophen **OR** acetaminophen, oxyCODONE-acetaminophen **AND** oxyCODONE    Physical    VITALS:  /71   Pulse 88   Temp 97.8 °F (36.6 °C) (Oral)   Resp 20   Ht 1.829 m (6')   Wt 85.4 kg (188 lb 3 oz)   SpO2 94%   BMI 25.52 kg/m²     24HR INTAKE/OUTPUT:      Intake/Output Summary (Last 24 hours) at 2024 1424  Last data filed at 2024 1352  Gross per 24 hour   Intake 1080 ml   Output 2250 ml   Net -1170 ml       24HR PULSE OXIMETRY RANGE:    SpO2  Av.4 %  Min: 89 %  Max: 94 %    General appearance: alert, appears stated age, and cooperative  Lungs: rhonchi bilaterally with cough  Heart: regular rate and rhythm, S1, S2 normal, no murmur, click, rub or gallop  Abdomen: soft, non-tender; bowel sounds normal; no masses,  no organomegaly  Extremities: extremities normal, atraumatic, no cyanosis or edema  Neurologic: Mental status: Alert, oriented, thought content appropriate    Data    CBC:   Recent Labs     24  1140 24  0310 24  0752   WBC 8.6 6.4 10.3   HGB 14.7 13.7 15.2   HCT 44.4 40.1 47.7   MCV 94.9 92.0 97.7    204 232       BMP:  Recent Labs 
  [facility] Quality Flow/Interdisciplinary Rounds Progress Note        Quality Flow Rounds held on November 25, 2024    Disciplines Attending:  Bedside Nurse, , , and Nursing Unit Leadership    Jason Cox was admitted on 11/23/2024 11:12 AM    Anticipated Discharge Date:       Disposition:    Sandro Score:  Sandro Scale Score: 20    Readmission Risk              Risk of Unplanned Readmission:  19           Discussed patient goal for the day, patient clinical progression, and barriers to discharge.  The following Goal(s) of the Day/Commitment(s) have been identified:   po abx steroids and diuretics, nebs, wean oxygen as able.       Juliana Cano RN  November 25, 2024        
  [facility] Quality Flow/Interdisciplinary Rounds Progress Note        Quality Flow Rounds held on November 26, 2024    Disciplines Attending:  Bedside Nurse, , , and Nursing Unit Leadership    Jason Cox was admitted on 11/23/2024 11:12 AM    Anticipated Discharge Date:       Disposition:    Sandro Score:  Sandro Scale Score: 19    Readmission Risk              Risk of Unplanned Readmission:  19           Discussed patient goal for the day, patient clinical progression, and barriers to discharge.  The following Goal(s) of the Day/Commitment(s) have been identified:   po zithro, pred, bumex, wean oxygen as able, possible d/c       Juliana Cano RN  November 26, 2024        
  [facility] Quality Flow/Interdisciplinary Rounds Progress Note        Quality Flow Rounds held on November 27, 2024    Disciplines Attending:  Bedside Nurse, , , and Nursing Unit Leadership    Jason Cox was admitted on 11/23/2024 11:12 AM    Anticipated Discharge Date:  Expected Discharge Date: 11/27/24    Disposition:    Sandro Score:  Sandro Scale Score: 19    Readmission Risk              Risk of Unplanned Readmission:  20           Discussed patient goal for the day, patient clinical progression, and barriers to discharge.  The following Goal(s) of the Day/Commitment(s) have been identified:   discharge.       Juliana Cano RN  November 27, 2024        
4 Eyes Skin Assessment     NAME:  Jason Cox  YOB: 1950  MEDICAL RECORD NUMBER:  [id number]    The patient is being assessed for  Admission    I agree that at least one RN has performed a thorough Head to Toe Skin Assessment on the patient. ALL assessment sites listed below have been assessed.      Areas assessed by both nurses:    Head, Face, Ears, Shoulders, Back, Chest, Arms, Elbows, Hands, Sacrum. Buttock, Coccyx, Ischium, Legs. Feet and Heels, and Under Medical Devices         Does the Patient have a Wound? No noted wound(s)       Sandro Prevention initiated by RN: No  Wound Care Orders initiated by RN: No    Pressure Injury (Stage 3,4, Unstageable, DTI, NWPT, and Complex wounds) if present, place Wound referral order by RN under : No    New Ostomies, if present place, Ostomy referral order under : No     Nurse 1 eSignature: Electronically signed by Justin Pandey RN on 11/23/24 at 10:45 PM EST    **SHARE this note so that the co-signing nurse can place an eSignature**    Nurse 2 eSignature: Electronically signed by Ezra Lutz RN on 11/24/24 at 5:00 AM EST   
ED to Inpatient Handoff Report    Notified Bianka that electronic handoff available and patient ready for transport to room 641.    Safety Risks: Risk of falls    Patient in Restraints: no    Constant Observer or Patient : no    Telemetry Monitoring Ordered :Yes           Order to transfer to unit without monitor:NO    Last MEWS: 1 Time completed: 1815    Deterioration Index Score:   Predictive Model Details          35 (Caution)  Factor Value    Calculated 11/23/2024 18:35 33% Age 74 years old    Deterioration Index Model 31% Supplemental oxygen High flow nasal cannula     15% Respiratory rate 20     7% Pulse oximetry 91 %     4% Sodium 142 mmol/L     3% Blood pH abnormal (7.469)     3% Potassium abnormal (3.3 mmol/L)     2% Pulse 92     2% Systolic 119     1% WBC count 8.6 k/uL     0% Temperature 97.7 °F (36.5 °C)     0% Hematocrit 44.4 %        Vitals:    11/23/24 1545 11/23/24 1639 11/23/24 1739 11/23/24 1815   BP: 121/61 113/78 132/79 119/78   Pulse: 91 90 (!) 103 92   Resp: 22 17  20   Temp:    97.7 °F (36.5 °C)   TempSrc:    Oral   SpO2: 96% 93%  91%   Weight:       Height:             Opportunity for questions and clarification was provided.     
Notified dr. Sargent that patient walked from bed to bathroom while on 9 L high flow and dropped to 83%.   
Per makayla Villaseñor to discharge from Pulmonary POV.   
Spiritual Health History and Assessment/Progress Note  [facility]    Loneliness/Social Isolation,  ,  ,      Name: Jason Cox MRN: [id number]    Age: 74 y.o.     Sex: male   Language: English   Episcopal: [employer]   COPD with acute exacerbation (HCC)     Date: 11/25/2024                           Spiritual Assessment began in [facility]S INTERMEDIATE        Referral/Consult From: Rounding   Encounter Overview/Reason: Loneliness/Social Isolation  Service Provided For: Patient    Mihaela, Belief, Meaning:   Patient has beliefs or practices that help with coping during difficult times  Family/Friends No family/friends present      Importance and Influence:  Patient has no beliefs influential to healthcare decision-making identified during this visit  Family/Friends No family/friends present    Community:  Patient feels well-supported. Support system includes: Spouse/Partner  Family/Friends No family/friends present    Assessment and Plan of Care:     Patient Interventions include: Facilitated expression of thoughts and feelings, Explored spiritual coping/struggle/distress, and Affirmed coping skills/support systems  Family/Friends Interventions include: No family/friends present    Patient Plan of Care: Spiritual Care available upon further referral  Family/Friends Plan of Care: No family/friends present    Electronically signed by Chaplain Courtney on 11/25/2024 at 12:05 PM   
   budesonide  500 mcg Nebulization BID RT    arformoterol tartrate  15 mcg Nebulization BID RT       Diet:   ADULT DIET; Regular     EXAM:  General: No distress. Alert.  Eyes: PERRL. No sclera icterus. No conjunctival injection.  ENT: No discharge. Pharynx clear.  Neck: Trachea midline. Normal thyroid.  Resp: No accessory muscle use. no rales. no wheezing. no rhonchi. Very diminished.   CV: Regular rate. Regular rhythm. No murmur or rub.   Abd: Non-tender. Non-distended. No masses. No organomegaly. Normal bowel sounds.   Skin: Warm and dry. No nodule on exposed extremities. No rash on exposed extremities.  Ext: No cyanosis, clubbing, edema  Lymph: No cervical LAD. No supraclavicular LAD.   M/S: No cyanosis. No joint deformity. No clubbing.   Neuro: Awake. Follows commands. Positive pupils/gag/corneals. Normal pain response.       Results:  CBC:   Recent Labs     11/24/24 0310 11/25/24 0752 11/26/24 0422   WBC 6.4 10.3 9.8   HGB 13.7 15.2 14.5   HCT 40.1 47.7 44.2   MCV 92.0 97.7 95.5    232 214     BMP:   Recent Labs     11/24/24 0310 11/25/24 0752 11/26/24 0422    138 138   K 3.2* 3.7 3.4*    99 100   CO2 25 27 27   BUN 8 10 19   CREATININE 0.7 0.7 0.9     LIVER PROFILE:   Recent Labs     11/23/24  1140   AST 18   ALT 9   BILITOT 0.7   ALKPHOS 67     PT/INR: No results for input(s): \"PROTIME\", \"INR\" in the last 72 hours.  APTT: No results for input(s): \"APTT\" in the last 72 hours.      Pathology:  N/A      Microbiology:  None    Recent ABG:   Recent Labs     11/23/24  1250   PH 7.469*   PO2 56.8*   PCO2 32.6*   HCO3 23.1   BE 0.3   O2SAT 90.2*   METHB 0.3   O2HB 88.8*   COHB 1.2   O2CON 19.4   HHB 9.7*   THB 15.6             Recent Films:  CTA PULMONARY W CONTRAST   Final Result   1. No evidence of pulmonary embolism or acute pulmonary abnormality.   2. Emphysematous changes.         XR CHEST PORTABLE   Final Result   1. There are no radiographic findings of CHF   2. Advanced emphysematous 
bumex, may need to increase dose/frequency, currently MWF. Suspected COPDe though also likely element CHFe  COPD exacerbation- cont rapid wean of steroids, suspect more CHF than COPDe for current admission. Cont nebs/inhalers. Follows with Dr. Little. Consult to pulmonology, appreciate recommendations.   Bilateral lower extremity swelling- increase bumex dose, Cr stable, likely transition to bumex 1mg MWF and 0.5mg on remainder of days given apparent fluid retention on prior MWF schedule  Hypokalemia- 3.3. monitor on daily BMP.   Acute on Chronic HFpEF- BNP 4665. Last EF 57% in October 2024. Monitor Is&Os and daily weights. Adjust diuresis  Chronic pain syndrome- continue home percocet.   IgG deficiency  History of mycobacterium avium complex      Dispo: anticipate home tomorrow    NOTE: Portions of this report may have been transcribed using voice recognition software. Every effort was made to ensure accuracy; however, inadvertent computerized transcription errors may be present.  Electronically signed by Glenys Christine MD on 11/25/2024 at 2:53 PM

## 2024-11-29 ENCOUNTER — CARE COORDINATION (OUTPATIENT)
Dept: CARE COORDINATION | Age: 74
End: 2024-11-29

## 2024-11-29 DIAGNOSIS — J44.1 CHRONIC OBSTRUCTIVE PULMONARY DISEASE WITH ACUTE EXACERBATION (HCC): Primary | ICD-10-CM

## 2024-11-29 DIAGNOSIS — J44.9 CHRONIC OBSTRUCTIVE PULMONARY DISEASE, UNSPECIFIED COPD TYPE (HCC): ICD-10-CM

## 2024-11-29 PROCEDURE — 1111F DSCHRG MED/CURRENT MED MERGE: CPT | Performed by: INTERNAL MEDICINE

## 2024-11-29 NOTE — CARE COORDINATION
Understanding, Needs Reinforcement    Teach information regarding medications, taught by Willow Solorzano RN at 11/29/2024 11:59 AM.  Learner: Patient  Readiness: Acceptance  Method: Explanation  Response: Verbalizes Understanding, Needs Reinforcement    Teach importance of blood pressure control, taught by Willow Solorzano RN at 11/29/2024 11:59 AM.  Learner: Patient  Readiness: Acceptance  Method: Explanation  Response: Verbalizes Understanding, Needs Reinforcement    Influenza Vaccine, taught by Willow Solorzano RN at 11/29/2024 11:59 AM.  Learner: Patient  Readiness: Acceptance  Method: Explanation  Response: Verbalizes Understanding, Needs Reinforcement    Pneumovax Recommendation, taught by Willow Solorzano RN at 11/29/2024 11:59 AM.  Learner: Patient  Readiness: Acceptance  Method: Explanation  Response: Verbalizes Understanding, Needs Reinforcement    heart failure self-management, taught by Willow Solorzano RN at 11/29/2024 11:59 AM.  Learner: Patient  Readiness: Acceptance  Method: Explanation  Response: Verbalizes Understanding, Needs Reinforcement    Handout: My Heart Failure Plan of Care, taught by Willow Solorzano RN at 11/29/2024 11:59 AM.  Learner: Patient  Readiness: Acceptance  Method: Explanation  Response: Verbalizes Understanding, Needs Reinforcement    Educate follow-up care, taught by Willow Solorzano RN at 11/29/2024 11:59 AM.  Learner: Patient  Readiness: Acceptance  Method: Explanation  Response: Verbalizes Understanding, Needs Reinforcement    Educate effective coping behavior, taught by Willow Solorzano RN at 11/29/2024 11:59 AM.  Learner: Patient  Readiness: Acceptance  Method: Explanation  Response: Verbalizes Understanding, Needs Reinforcement    Educate physical activity cessation, taught by Willow Solorzano RN at 11/29/2024 11:59 AM.  Learner: Patient  Readiness: Acceptance  Method: Explanation  Response: Verbalizes Understanding, Needs Reinforcement    Diet, taught

## 2024-12-06 ENCOUNTER — DOCUMENTATION (OUTPATIENT)
Dept: TRANSPLANT | Facility: HOSPITAL | Age: 74
End: 2024-12-06
Payer: MEDICARE

## 2024-12-06 ENCOUNTER — CARE COORDINATION (OUTPATIENT)
Dept: CARE COORDINATION | Age: 74
End: 2024-12-06

## 2024-12-06 ENCOUNTER — TELEPHONE (OUTPATIENT)
Dept: RESPIRATORY THERAPY | Facility: HOSPITAL | Age: 74
End: 2024-12-06

## 2024-12-06 NOTE — CARE COORDINATION
Ambulatory Care Coordination Note     12/6/2024 4:20 PM     ACM outreach attempt by this ACM today to perform care management follow up . ACM was unable to reach the patient by telephone today;   left voice message requesting a return phone call to this ACM.     ACM: Willow Solorzano RN     Care Summary Note: Will continue to follow and assess needs.    PCP/Specialist follow up:   Future Appointments         Provider Specialty Dept Phone    12/17/2024 11:15 AM Juni Loyd MD Internal Medicine [phone]    2/6/2025 1:30 PM Dea Cason APRN - NP Pulmonology [phone]    2/18/2025 2:00 PM Chirag Dawson,  Otolaryngology [phone]    2/25/2025 9:00 AM Korey Duque MD Infectious Diseases [phone]            Follow Up:   Plan for next ACM outreach in approximately 1 month to complete:  - disease specific assessments  - goal progression  - education .

## 2024-12-06 NOTE — PROGRESS NOTES
Medicare A/B eff 02/01/2017 and Part D active with [employer]. [facility] Medicare supp plan F active. Listing auth is not required with traditional Medicare.

## 2024-12-06 NOTE — TELEPHONE ENCOUNTER
Lung Intake  Intake Facilitator: JUSTIN RUIZ   Have you ever contacted the [facility] before? No   Are you currently listed or being evaluated by any other transplant center? No   If yes, where are you currently listed or being evaluated for transplant?  N/A  Are you also seeking other organ transplant(s)? No   Do you have your own transportation? Yes   Do you have a 's license? Yes   Do you have a working vehicle? Yes   Do you have someone in your support system that can drive you to and from all of your appointments as needed? Yes . Between wife and grandson. Stated this might be a tricky area.   Are you prepared to drive to [facility] in [place], [place] for all necessary appointments, if no, you may need to speak with a  prior to scheduling your appointments. We are unable to assist with transportation or parking. Yes   Who do you anticipate will be your main support person?  Wife  Myesha Goss    Pulmonary Care  Have you been admitted to the hospital in the past twelve months? Yes  about 5x  If yes, when and where were you admitted? [facility], mid November    Are you currently prescribed oxygen? Yes . Have it on 24/7  How much oxygen do you require at rest? 7 Liters  How much oxygen with activity? 7-8 Liters  Do you wear CPAP or BIPAP? No   Are you currently participating in pulmonary rehab? No   If yes, where and how frequently do you participate? N/A  Have you had Pulmonary function testing (PFT) in the last six months? No   If yes, when and where? N/A  Have you had a CT scan of your chest in the last six months? Yes   If yes, when and where? [facility] Mid November  Have you had a cardiac catheterization in the last six months? No   If yes, when and where? N/A  Have you had an echocardiogram in the last 6 months? Yes   If yes, when and where? Mid November, [facility]        General Health Status Lung  Any food or drug allergies? No   How tall are you?   6'1  What is your usual weight? 174 lbs  Have you lost weight in the past 3 months? No  -- but lost 10 lbs of fluid due to swelling  If yes, how much weight have you lost? N/A  If yes, was this weight loss intentional? No   Has your food intake or appetite decreased over the past week? No   Do you have any chronic infections or blood infections such as HIV, Hepatitis C, Hepatitis B? Has fungal infection in lungs. It's being managed with medication.  Have you had a shingles vaccine? Yes   Have you had an influenza vaccine in the last year? No   Have you had a pneumonia vaccine in the last 5 years?  Unsure  Have you had COVID? Yes   Have you received the COVID vaccine? Yes   Have you received any vaccines in the last 3 months? No   Have you had previous surgery in the chest area (heart and/or lungs)? No   When, where and what chest surgeries have you had? N/A  Are you a Mandaeism? No   Do you have any Alevism beliefs that prevent you from receiving blood products? No   Have you ever had blood transfusions? No   How many blood transfusions have you had?  N/A  When was your last blood transfusion? N/A  Have you ever been pregnant?  N/A  How many pregnancies have you had (including any that did not come to term) N/A  When was your most recent pregnancy? N/A  Do you use any assistive devices? (Walker, wheelchair, glasses, hearing aids, etc.) Yes   If yes, what assistive devices are used? Glasses, has walker but doesn't use it  Any difficulty reading or writing? No   Do you have any other medical conditions besides your lung disease? Yes , congestive heart failure  Cancer History Lung  Do you have a history of cancer? No   Cancer details N/A  Oncologist Name and when was your last visit N/A  Substance Use History Lung  Are you a current or former tobacco user? Yes   When did you start using tobacco products? (year) Age 11-12  Describe your tobacco use. (packs/day, cigarettes/day) 3 packs a day  When did you quit  using tobacco? 2016  Do you use other forms of nicotine such as patches, gum, vapes, or e-cigs? Yes  Used Chantix, tried gum but it didn't help  Describe your nicotine use including amount and frequency? Twice, overall for Chantix  When did you quit using nicotine? 2016  Are you a current or former alcohol user? Yes   How many drinks per week? 6 pack a day  When did you quit drinking alcohol? 23 years ago  Have you ever received treatment for alcohol abuse? No treatment, just went to the meetings  If so, where and when? N/A  Have you been prescribed medical marijuana? No   What is your prescription and who prescribes it? N/A  How do you consume your marijuana? N/A  Are you a current or former user of non-prescribed drugs such as marijuana, heroin, cocaine, meth, crack, opioids, or any other recreational substances? No   Describe your drug use. N/A  When did you quit using drugs? N/A  Have you ever received treatment for substance abuse?  N/A  If so, where and when? N/A  General Health Maintenance  Have you had a colonoscopy in the last 5 years? Yes   If yes, when and where? [facility] 2 years ago  If no, do you have a family history of colon cancer? No   Have you had a PAP test in the past 2 years?  N/A  If you had a hysterectomy, is a PAP test no longer needed?  N/A  When and where was your last PAP test performed? N/A  PAP test results, other comments N/A  Do you have a family history of breast cancer? No   When and where was your last mammogram? N/A  Have you visited a dentist within the last 6 months? No   Do you have full or partial dentures? No   When and where was your last dental visit? 2 years, Dr. Torsten Banda [address] [place], [place] [address]. Phone number: [phone]  What is your availability for appointments? We will try to accommodate when possible. Open/flexible. Afternoons preferred   If still working, can you get time off?  Retired  Do you consent to bringing a support person  with you to Education and Evaluation appointments? Yes   Are you able to bring a medication list as well as an immunizations list to your education appointment? Yes    Comments: Patient was able to provide a lot of information very easily. Pleasant to speak to. Is eager to discuss with clinic staff about the transplant process.

## 2024-12-09 NOTE — ED NOTES
NANETTE faxed to the floor.       Abdullahi Sanchez RN  06/09/19 2131
Open area low back measuring 3 cm x 3/4 cm at widest area. No drainage noted. Wound swabbed for culture by Dr Leslie Kwan and sent to the lab. Patient denies pain.       Katherine Jacques RN  06/09/19 6383
Patient given a urinal for urine sample. Call bell in reach.       Micki Majano RN  06/09/19 8854
SBAR received on floor.       Will Quintanilla, RN  06/09/19 7369
Attending Attestation (For Attendings USE Only)...

## 2024-12-09 NOTE — TELEPHONE ENCOUNTER
Called patient to confirm education appointments for 1/8. Patient has questions regarding his coverage and is requesting a call back from coord.

## 2024-12-10 ENCOUNTER — DOCUMENTATION (OUTPATIENT)
Dept: TRANSPLANT | Facility: HOSPITAL | Age: 74
End: 2024-12-10
Payer: MEDICARE

## 2024-12-12 ENCOUNTER — DOCUMENTATION (OUTPATIENT)
Dept: TRANSPLANT | Facility: HOSPITAL | Age: 74
End: 2024-12-12
Payer: MEDICARE

## 2024-12-27 ENCOUNTER — TELEPHONE (OUTPATIENT)
Dept: TRANSPLANT | Facility: HOSPITAL | Age: 74
End: 2024-12-27

## 2024-12-27 ENCOUNTER — TELEPHONE (OUTPATIENT)
Dept: TRANSPLANT | Facility: HOSPITAL | Age: 74
End: 2024-12-27
Payer: MEDICARE

## 2024-12-27 NOTE — TELEPHONE ENCOUNTER
Called patient to inquire about his cancelled appointments coming up. He states that he does not want to move forward at this time due to transportation issues, pain in his back that causes immobility, and the fact that he is always sick. I let him know to call his insurance to see if they would have any services for transport, but it would be necessary to have support people to help him with recovery. Gave him our office number to call back if anything changes.     Discussed with Dr. Polanco and team. Plan to hold off on closing transplant episode until Dr. Polanco speaks with referring provider next week.

## 2024-12-30 NOTE — TELEPHONE ENCOUNTER
Called and left message for referring provider Dr. Gatica, advised we will close patient's case as patient does not wish to proceed further with lung transplant. Advised to call our office with any further questions/concerns.

## 2024-12-31 ENCOUNTER — DOCUMENTATION (OUTPATIENT)
Dept: TRANSPLANT | Facility: HOSPITAL | Age: 74
End: 2024-12-31
Payer: MEDICARE

## 2024-12-31 NOTE — PROGRESS NOTES
Per committee; Дмитрий Goss would not like to proceed with transplant evaluation at this time. Dr. Polanco to call referring provider today and plan to close patient's episode after.

## 2025-01-02 ENCOUNTER — DOCUMENTATION (OUTPATIENT)
Dept: TRANSPLANT | Facility: HOSPITAL | Age: 75
End: 2025-01-02
Payer: MEDICARE

## 2025-01-02 NOTE — PROGRESS NOTES
Letters sent to patient and referring physician (Dr. Bulmaro Gatica) with update that patient's lung transplant case has been closed at patient's request. Patient educated during phone call with writer to call our office at any time should he like to revisit the transplant process.

## 2025-01-08 ENCOUNTER — APPOINTMENT (OUTPATIENT)
Dept: TRANSPLANT | Facility: HOSPITAL | Age: 75
End: 2025-01-08
Payer: MEDICARE

## 2025-01-09 ENCOUNTER — CARE COORDINATION (OUTPATIENT)
Dept: CARE COORDINATION | Age: 75
End: 2025-01-09

## 2025-01-09 NOTE — CARE COORDINATION
Ambulatory Care Coordination Note     1/9/2025 5:36 PM     ACM outreach attempt by this ACM today to perform care management follow up . ACM was unable to reach the patient by telephone today;   left voice message requesting a return phone call to this ACM.     ACM: Willow Solorzano RN     Care Summary Note: Message to return call left with VM. Will continue to reach out. Pt has scheduled follow up appointments    PCP/Specialist follow up:   Future Appointments         Provider Specialty Dept Phone    2/6/2025 1:30 PM Dea Cason APRN - NP Pulmonology [phone]    2/18/2025 2:00 PM Chirag Dawson,  Otolaryngology [phone]    2/25/2025 9:00 AM Korey Duque MD Infectious Diseases [phone]            Follow Up:   Plan for next ACM outreach in approximately 3 weeks to complete:  - disease specific assessments  - medication review  - goal progression  - education .

## 2025-01-13 ENCOUNTER — HOSPITAL ENCOUNTER (INPATIENT)
Age: 75
LOS: 14 days | Discharge: NURSING, REHAB OR LONG-TERM CARE | DRG: 189 | End: 2025-01-27
Attending: EMERGENCY MEDICINE | Admitting: INTERNAL MEDICINE
Payer: MEDICARE

## 2025-01-13 ENCOUNTER — CARE COORDINATION (OUTPATIENT)
Dept: CARE COORDINATION | Age: 75
End: 2025-01-13

## 2025-01-13 ENCOUNTER — APPOINTMENT (OUTPATIENT)
Dept: GENERAL RADIOLOGY | Age: 75
DRG: 189 | End: 2025-01-13
Payer: MEDICARE

## 2025-01-13 DIAGNOSIS — J96.21 ACUTE ON CHRONIC HYPOXIC RESPIRATORY FAILURE: ICD-10-CM

## 2025-01-13 DIAGNOSIS — J44.1 COPD EXACERBATION (HCC): Primary | ICD-10-CM

## 2025-01-13 DIAGNOSIS — I48.0 PAROXYSMAL ATRIAL FIBRILLATION (HCC): ICD-10-CM

## 2025-01-13 DIAGNOSIS — I50.9 ACUTE ON CHRONIC CONGESTIVE HEART FAILURE, UNSPECIFIED HEART FAILURE TYPE (HCC): ICD-10-CM

## 2025-01-13 LAB
ALBUMIN SERPL-MCNC: 4.1 G/DL (ref 3.5–5.2)
ALP SERPL-CCNC: 71 U/L (ref 40–129)
ALT SERPL-CCNC: 11 U/L (ref 0–40)
ANION GAP SERPL CALCULATED.3IONS-SCNC: 14 MMOL/L (ref 7–16)
AST SERPL-CCNC: 21 U/L (ref 0–39)
B PARAP IS1001 DNA NPH QL NAA+NON-PROBE: NOT DETECTED
B PERT DNA SPEC QL NAA+PROBE: NOT DETECTED
BASOPHILS # BLD: 0.03 K/UL (ref 0–0.2)
BASOPHILS NFR BLD: 0 % (ref 0–2)
BILIRUB SERPL-MCNC: 0.9 MG/DL (ref 0–1.2)
BNP SERPL-MCNC: 6154 PG/ML (ref 0–125)
BUN SERPL-MCNC: 10 MG/DL (ref 6–23)
C PNEUM DNA NPH QL NAA+NON-PROBE: NOT DETECTED
CALCIUM SERPL-MCNC: 9.5 MG/DL (ref 8.6–10.2)
CHLORIDE SERPL-SCNC: 101 MMOL/L (ref 98–107)
CO2 SERPL-SCNC: 25 MMOL/L (ref 22–29)
CREAT SERPL-MCNC: 0.9 MG/DL (ref 0.7–1.2)
EOSINOPHIL # BLD: 0.03 K/UL (ref 0.05–0.5)
EOSINOPHILS RELATIVE PERCENT: 0 % (ref 0–6)
ERYTHROCYTE [DISTWIDTH] IN BLOOD BY AUTOMATED COUNT: 14.9 % (ref 11.5–15)
FLUAV RNA NPH QL NAA+NON-PROBE: NOT DETECTED
FLUBV RNA NPH QL NAA+NON-PROBE: NOT DETECTED
GFR, ESTIMATED: >90 ML/MIN/1.73M2
GLUCOSE SERPL-MCNC: 158 MG/DL (ref 74–99)
HADV DNA NPH QL NAA+NON-PROBE: NOT DETECTED
HCOV 229E RNA NPH QL NAA+NON-PROBE: NOT DETECTED
HCOV HKU1 RNA NPH QL NAA+NON-PROBE: NOT DETECTED
HCOV NL63 RNA NPH QL NAA+NON-PROBE: NOT DETECTED
HCOV OC43 RNA NPH QL NAA+NON-PROBE: NOT DETECTED
HCT VFR BLD AUTO: 48.1 % (ref 37–54)
HGB BLD-MCNC: 15.6 G/DL (ref 12.5–16.5)
HMPV RNA NPH QL NAA+NON-PROBE: NOT DETECTED
HPIV1 RNA NPH QL NAA+NON-PROBE: NOT DETECTED
HPIV2 RNA NPH QL NAA+NON-PROBE: NOT DETECTED
HPIV3 RNA NPH QL NAA+NON-PROBE: NOT DETECTED
HPIV4 RNA NPH QL NAA+NON-PROBE: NOT DETECTED
IMM GRANULOCYTES # BLD AUTO: 0.07 K/UL (ref 0–0.58)
IMM GRANULOCYTES NFR BLD: 1 % (ref 0–5)
LYMPHOCYTES NFR BLD: 1 K/UL (ref 1.5–4)
LYMPHOCYTES RELATIVE PERCENT: 10 % (ref 20–42)
M PNEUMO DNA NPH QL NAA+NON-PROBE: NOT DETECTED
MAGNESIUM SERPL-MCNC: 1.6 MG/DL (ref 1.6–2.6)
MCH RBC QN AUTO: 31.5 PG (ref 26–35)
MCHC RBC AUTO-ENTMCNC: 32.4 G/DL (ref 32–34.5)
MCV RBC AUTO: 97 FL (ref 80–99.9)
MONOCYTES NFR BLD: 0.48 K/UL (ref 0.1–0.95)
MONOCYTES NFR BLD: 5 % (ref 2–12)
NEUTROPHILS NFR BLD: 84 % (ref 43–80)
NEUTS SEG NFR BLD: 8.11 K/UL (ref 1.8–7.3)
PLATELET # BLD AUTO: 218 K/UL (ref 130–450)
PMV BLD AUTO: 10.9 FL (ref 7–12)
POTASSIUM SERPL-SCNC: 3.7 MMOL/L (ref 3.5–5)
PROT SERPL-MCNC: 6.7 G/DL (ref 6.4–8.3)
RBC # BLD AUTO: 4.96 M/UL (ref 3.8–5.8)
RSV RNA NPH QL NAA+NON-PROBE: NOT DETECTED
RV+EV RNA NPH QL NAA+NON-PROBE: NOT DETECTED
SARS-COV-2 RNA NPH QL NAA+NON-PROBE: NOT DETECTED
SODIUM SERPL-SCNC: 140 MMOL/L (ref 132–146)
SPECIMEN DESCRIPTION: NORMAL
TROPONIN I SERPL HS-MCNC: 15 NG/L (ref 0–11)
TROPONIN I SERPL HS-MCNC: 18 NG/L (ref 0–11)
WBC OTHER # BLD: 9.7 K/UL (ref 4.5–11.5)

## 2025-01-13 PROCEDURE — 0202U NFCT DS 22 TRGT SARS-COV-2: CPT

## 2025-01-13 PROCEDURE — 84484 ASSAY OF TROPONIN QUANT: CPT

## 2025-01-13 PROCEDURE — 99285 EMERGENCY DEPT VISIT HI MDM: CPT

## 2025-01-13 PROCEDURE — 6370000000 HC RX 637 (ALT 250 FOR IP)

## 2025-01-13 PROCEDURE — 6360000002 HC RX W HCPCS

## 2025-01-13 PROCEDURE — 94664 DEMO&/EVAL PT USE INHALER: CPT

## 2025-01-13 PROCEDURE — 94640 AIRWAY INHALATION TREATMENT: CPT

## 2025-01-13 PROCEDURE — 80053 COMPREHEN METABOLIC PANEL: CPT

## 2025-01-13 PROCEDURE — 85025 COMPLETE CBC W/AUTO DIFF WBC: CPT

## 2025-01-13 PROCEDURE — 6360000002 HC RX W HCPCS: Performed by: EMERGENCY MEDICINE

## 2025-01-13 PROCEDURE — 96365 THER/PROPH/DIAG IV INF INIT: CPT

## 2025-01-13 PROCEDURE — 83735 ASSAY OF MAGNESIUM: CPT

## 2025-01-13 PROCEDURE — 2500000003 HC RX 250 WO HCPCS

## 2025-01-13 PROCEDURE — 2060000000 HC ICU INTERMEDIATE R&B

## 2025-01-13 PROCEDURE — 96375 TX/PRO/DX INJ NEW DRUG ADDON: CPT

## 2025-01-13 PROCEDURE — 71045 X-RAY EXAM CHEST 1 VIEW: CPT

## 2025-01-13 PROCEDURE — 93005 ELECTROCARDIOGRAM TRACING: CPT

## 2025-01-13 PROCEDURE — 96366 THER/PROPH/DIAG IV INF ADDON: CPT

## 2025-01-13 PROCEDURE — 83880 ASSAY OF NATRIURETIC PEPTIDE: CPT

## 2025-01-13 PROCEDURE — 99222 1ST HOSP IP/OBS MODERATE 55: CPT | Performed by: INTERNAL MEDICINE

## 2025-01-13 RX ORDER — ENOXAPARIN SODIUM 100 MG/ML
40 INJECTION SUBCUTANEOUS DAILY
Status: DISCONTINUED | OUTPATIENT
Start: 2025-01-13 | End: 2025-01-16

## 2025-01-13 RX ORDER — FLUTICASONE PROPIONATE 50 MCG
1 SPRAY, SUSPENSION (ML) NASAL DAILY PRN
COMMUNITY

## 2025-01-13 RX ORDER — SODIUM CHLORIDE 0.9 % (FLUSH) 0.9 %
5-40 SYRINGE (ML) INJECTION PRN
Status: DISCONTINUED | OUTPATIENT
Start: 2025-01-13 | End: 2025-01-27 | Stop reason: HOSPADM

## 2025-01-13 RX ORDER — POLYETHYLENE GLYCOL 3350 17 G/17G
17 POWDER, FOR SOLUTION ORAL DAILY PRN
Status: DISCONTINUED | OUTPATIENT
Start: 2025-01-13 | End: 2025-01-27 | Stop reason: HOSPADM

## 2025-01-13 RX ORDER — MAGNESIUM SULFATE IN WATER 40 MG/ML
2000 INJECTION, SOLUTION INTRAVENOUS ONCE
Status: COMPLETED | OUTPATIENT
Start: 2025-01-13 | End: 2025-01-13

## 2025-01-13 RX ORDER — BUMETANIDE 1 MG/1
0.5 TABLET ORAL
Status: ON HOLD | COMMUNITY
End: 2025-01-27 | Stop reason: HOSPADM

## 2025-01-13 RX ORDER — METHYLPREDNISOLONE SODIUM SUCCINATE 125 MG/2ML
125 INJECTION INTRAMUSCULAR; INTRAVENOUS ONCE
Status: COMPLETED | OUTPATIENT
Start: 2025-01-13 | End: 2025-01-13

## 2025-01-13 RX ORDER — ALBUTEROL SULFATE 0.83 MG/ML
2.5 SOLUTION RESPIRATORY (INHALATION)
Status: DISCONTINUED | OUTPATIENT
Start: 2025-01-13 | End: 2025-01-27 | Stop reason: HOSPADM

## 2025-01-13 RX ORDER — BUMETANIDE 0.25 MG/ML
0.5 INJECTION, SOLUTION INTRAMUSCULAR; INTRAVENOUS ONCE
Status: COMPLETED | OUTPATIENT
Start: 2025-01-13 | End: 2025-01-13

## 2025-01-13 RX ORDER — PREDNISONE 20 MG/1
40 TABLET ORAL DAILY
Status: COMPLETED | OUTPATIENT
Start: 2025-01-16 | End: 2025-01-18

## 2025-01-13 RX ORDER — METHYLPREDNISOLONE SODIUM SUCCINATE 40 MG/ML
40 INJECTION INTRAMUSCULAR; INTRAVENOUS EVERY 6 HOURS
Status: DISPENSED | OUTPATIENT
Start: 2025-01-13 | End: 2025-01-15

## 2025-01-13 RX ORDER — SODIUM CHLORIDE 9 MG/ML
INJECTION, SOLUTION INTRAVENOUS PRN
Status: DISCONTINUED | OUTPATIENT
Start: 2025-01-13 | End: 2025-01-27 | Stop reason: HOSPADM

## 2025-01-13 RX ORDER — BUMETANIDE 0.25 MG/ML
0.5 INJECTION, SOLUTION INTRAMUSCULAR; INTRAVENOUS 2 TIMES DAILY
Status: CANCELLED | OUTPATIENT
Start: 2025-01-14

## 2025-01-13 RX ORDER — ACETAMINOPHEN 325 MG/1
650 TABLET ORAL EVERY 6 HOURS PRN
Status: DISCONTINUED | OUTPATIENT
Start: 2025-01-13 | End: 2025-01-27 | Stop reason: HOSPADM

## 2025-01-13 RX ORDER — PROCHLORPERAZINE EDISYLATE 5 MG/ML
10 INJECTION INTRAMUSCULAR; INTRAVENOUS EVERY 6 HOURS PRN
Status: DISCONTINUED | OUTPATIENT
Start: 2025-01-13 | End: 2025-01-27 | Stop reason: HOSPADM

## 2025-01-13 RX ORDER — PROMETHAZINE HYDROCHLORIDE 25 MG/1
12.5 TABLET ORAL EVERY 6 HOURS PRN
Status: DISCONTINUED | OUTPATIENT
Start: 2025-01-13 | End: 2025-01-13 | Stop reason: ALTCHOICE

## 2025-01-13 RX ORDER — SODIUM CHLORIDE 0.9 % (FLUSH) 0.9 %
5-40 SYRINGE (ML) INJECTION EVERY 12 HOURS SCHEDULED
Status: DISCONTINUED | OUTPATIENT
Start: 2025-01-13 | End: 2025-01-27 | Stop reason: HOSPADM

## 2025-01-13 RX ORDER — IPRATROPIUM BROMIDE AND ALBUTEROL SULFATE 2.5; .5 MG/3ML; MG/3ML
3 SOLUTION RESPIRATORY (INHALATION) ONCE
Status: COMPLETED | OUTPATIENT
Start: 2025-01-13 | End: 2025-01-13

## 2025-01-13 RX ORDER — IPRATROPIUM BROMIDE AND ALBUTEROL SULFATE 2.5; .5 MG/3ML; MG/3ML
1 SOLUTION RESPIRATORY (INHALATION)
Status: DISCONTINUED | OUTPATIENT
Start: 2025-01-13 | End: 2025-01-27 | Stop reason: HOSPADM

## 2025-01-13 RX ORDER — ONDANSETRON 2 MG/ML
4 INJECTION INTRAMUSCULAR; INTRAVENOUS EVERY 6 HOURS PRN
Status: DISCONTINUED | OUTPATIENT
Start: 2025-01-13 | End: 2025-01-13 | Stop reason: ALTCHOICE

## 2025-01-13 RX ORDER — BUMETANIDE 1 MG/1
1 TABLET ORAL
Status: ON HOLD | COMMUNITY
End: 2025-01-27 | Stop reason: HOSPADM

## 2025-01-13 RX ORDER — PROCHLORPERAZINE MALEATE 10 MG
10 TABLET ORAL EVERY 8 HOURS PRN
Status: DISCONTINUED | OUTPATIENT
Start: 2025-01-13 | End: 2025-01-27 | Stop reason: HOSPADM

## 2025-01-13 RX ORDER — ACETAMINOPHEN 650 MG/1
650 SUPPOSITORY RECTAL EVERY 6 HOURS PRN
Status: DISCONTINUED | OUTPATIENT
Start: 2025-01-13 | End: 2025-01-27 | Stop reason: HOSPADM

## 2025-01-13 RX ORDER — ALBUTEROL SULFATE 0.83 MG/ML
2.5 SOLUTION RESPIRATORY (INHALATION)
Status: DISCONTINUED | OUTPATIENT
Start: 2025-01-13 | End: 2025-01-14

## 2025-01-13 RX ADMIN — MAGNESIUM SULFATE HEPTAHYDRATE 2000 MG: 40 INJECTION, SOLUTION INTRAVENOUS at 13:46

## 2025-01-13 RX ADMIN — METHYLPREDNISOLONE SODIUM SUCCINATE 125 MG: 125 INJECTION INTRAMUSCULAR; INTRAVENOUS at 12:21

## 2025-01-13 RX ADMIN — BUMETANIDE 0.5 MG: 0.25 INJECTION INTRAMUSCULAR; INTRAVENOUS at 17:50

## 2025-01-13 RX ADMIN — SODIUM CHLORIDE, PRESERVATIVE FREE 10 ML: 5 INJECTION INTRAVENOUS at 22:06

## 2025-01-13 RX ADMIN — IPRATROPIUM BROMIDE AND ALBUTEROL SULFATE 3 DOSE: 2.5; .5 SOLUTION RESPIRATORY (INHALATION) at 12:07

## 2025-01-13 RX ADMIN — ACETAMINOPHEN 650 MG: 325 TABLET ORAL at 23:36

## 2025-01-13 RX ADMIN — IPRATROPIUM BROMIDE AND ALBUTEROL SULFATE 1 DOSE: 2.5; .5 SOLUTION RESPIRATORY (INHALATION) at 18:12

## 2025-01-13 ASSESSMENT — PAIN DESCRIPTION - LOCATION: LOCATION: THROAT;NECK

## 2025-01-13 ASSESSMENT — PAIN SCALES - GENERAL: PAINLEVEL_OUTOF10: 2

## 2025-01-13 ASSESSMENT — PAIN DESCRIPTION - DESCRIPTORS: DESCRIPTORS: ACHING

## 2025-01-13 ASSESSMENT — PAIN - FUNCTIONAL ASSESSMENT: PAIN_FUNCTIONAL_ASSESSMENT: NONE - DENIES PAIN

## 2025-01-13 NOTE — CARE COORDINATION
Pt has been admitted into the hospital [facility] for SOB, COPD. Will continue to follow when he returns home for CC.

## 2025-01-13 NOTE — ED NOTES
Department of Emergency Medicine  FIRST PROVIDER TRIAGE NOTE             Independent MLP           1/13/25  11:36 AM EST    Date of Encounter: 1/13/25   MRN: [id number]      HPI: Jason Cox is a 74 y.o. male who presents to the ED for Shortness of Breath (65% on 8L NC; placed on NRB 15L Hx CHF) and Leg Swelling       ROS: Negative for cp.    PE: Gen Appearance/Constitutional: alert    Initial Plan of Care: All treatment areas with department are currently occupied.      Plan to order/Initiate the following while awaiting opening in ED: Triage evaluation  .     Provider-Patient relationship only established for Provider In Triage (PIT).  Full assessment, HPI and examination not performed, therefore, it is not yet possible to state whether or not an emergency medical condition exists     Initial Plan of Care: Initiate Treatment-Testing, Proceed toTreatment Area When Bed Available for ED Attending/MLP to Continue Care  Secondary to high volume, low staffing, and/or boarding- patient to await bed availability.     This ends my PIT-Patient relationship.  Care of patient relinquished after triage         Electronically signed by MIGUE Dubois NP   DD: 1/13/25

## 2025-01-13 NOTE — ED PROVIDER NOTES
Differential diagnoses include but are not limited to COPD exacerbation, CHF exacerbation, ACS, COVID-19, influenza, pneumonia, pneumothorax.     Laboratory studies interpreted by me: CBC largely unremarkable.  CMP unremarkable.  Troponin flat at 18 and 15.  BNP is elevated at 6154.  Magnesium normal at 1.6.  Respiratory panel is pending.    Imaging studies: EKG shows no signs of acute STEMI.  Chest x-ray shows no acute process with chronic scarring.    Patient likely has some degree of COPD exacerbation likely combined with CHF exacerbation.  ACS unlikely with flat troponins and reassuring EKG.  COVID-19 influenza are pending.  No pneumonia or pneumothorax seen on chest imaging.    Initial medications ordered include DuoNebs and Solu-Medrol.  Initial medications were somewhat effective.  On reassessment, patient feels he is breathing better.  He still has pretibial edema and given 0.5 mg of Bumex.  Vital signs have improved with a heart rate that is normalized.    Shared decision making utilized with patient and present parties, and due to their presenting conditions patient to be admitted to the hospital for inpatient management and monitoring.  Patient has remained closely monitored with multiple reevaluations and complex medical decision making throughout the course of the emergency department stay.  They have clinically improved and have hemodynamically improved.  After emergency department management is complete, patient to be admitted to the hospital.  [facility].    Is this patient to be included in the SEP-1 core measure? No Exclusion criteria - the patient is NOT to be included for SEP-1 Core Measure due to: Infection is not suspected    Please see ED Course below for further MDM       Medications administered today:  Medications   magnesium sulfate 2000 mg in 50 mL IVPB premix (2,000 mg IntraVENous New Bag 1/13/25 8463)   bumetanide (BUMEX) injection 0.5 mg (has no

## 2025-01-13 NOTE — H&P
[facility] Hospitalist Group   History and Physical      CHIEF COMPLAINT: SOB, leg swelling    History of Present Illness:  74 y.o. male with a history of end-stage emphysema chronically on 7-8L NC, HFpEF, pulmonary hypertension, status post lumbar fusion, thoracic ascending aortic aneurysm  presents with SOB and leg swelling.Patient has history of end-stage emphysema and is chronically on 7 L nasal cannula.  Reports shortness of breath for the past week, worsening last night.  Patient follows with Dr. Little. Patient is following with  for transplant evaluation.  Was 65% on 8 L nasal cannula and placed on nonrebreather 15 L.  Patient does also have a history of CHF.  Frequently admitted to hospital for COPD exacerbations and CHF exacerbations.  Tachycardic in ED.  CXR showed advanced emphysematous changes and chronic scarring within right lung base.  Pertinent abnormal labs: Neutrophils 84%, lymphocytes 10%, glucose 158, troponin 18, repeat 15, BNP 6154.  ED course included 3 doses DuoNeb, 2000 mg magnesium sulfate, 125 mg Solu-Medrol, 0.5 mg Bumex.    Informant(s) for H&P: Patient and EMR    REVIEW OF SYSTEMS:  SOB. no fevers, chills, cp, n/v, ha, vision/hearing changes, wt changes, hot/cold flashes, other open skin lesions, diarrhea, constipation, dysuria/hematuria unless noted in HPI. Complete ROS performed with the patient and is otherwise negative.      PMH:  Past Medical History:   Diagnosis Date    Acute and chronic respiratory failure with hypoxia 10/12/2017    chronic oxygen use 7 liters nc continuous at present    Acute exacerbation of chronic obstructive pulmonary disease (COPD) ([facility]) 08/07/2023    Acute heart failure with preserved ejection fraction (HCC) 04/08/2021    follows with PCP    Acute respiratory disease due to severe acute respiratory syndrome coronavirus 2 (SARS-CoV-2) 01/01/2021    Acute respiratory failure with hypoxia 11/12/2018    Bullous emphysema ([facility]) 11/12/2018

## 2025-01-14 LAB
ANION GAP SERPL CALCULATED.3IONS-SCNC: 14 MMOL/L (ref 7–16)
BASOPHILS # BLD: 0 K/UL (ref 0–0.2)
BASOPHILS NFR BLD: 0 % (ref 0–2)
BUN SERPL-MCNC: 13 MG/DL (ref 6–23)
CALCIUM SERPL-MCNC: 9.3 MG/DL (ref 8.6–10.2)
CHLORIDE SERPL-SCNC: 101 MMOL/L (ref 98–107)
CO2 SERPL-SCNC: 24 MMOL/L (ref 22–29)
CREAT SERPL-MCNC: 0.8 MG/DL (ref 0.7–1.2)
EOSINOPHIL # BLD: 0 K/UL (ref 0.05–0.5)
EOSINOPHILS RELATIVE PERCENT: 0 % (ref 0–6)
ERYTHROCYTE [DISTWIDTH] IN BLOOD BY AUTOMATED COUNT: 14.5 % (ref 11.5–15)
GFR, ESTIMATED: >90 ML/MIN/1.73M2
GLUCOSE SERPL-MCNC: 130 MG/DL (ref 74–99)
HCT VFR BLD AUTO: 45.5 % (ref 37–54)
HGB BLD-MCNC: 14.9 G/DL (ref 12.5–16.5)
LYMPHOCYTES NFR BLD: 0.18 K/UL (ref 1.5–4)
LYMPHOCYTES RELATIVE PERCENT: 3 % (ref 20–42)
MCH RBC QN AUTO: 31.3 PG (ref 26–35)
MCHC RBC AUTO-ENTMCNC: 32.7 G/DL (ref 32–34.5)
MCV RBC AUTO: 95.6 FL (ref 80–99.9)
MONOCYTES NFR BLD: 0.06 K/UL (ref 0.1–0.95)
MONOCYTES NFR BLD: 1 % (ref 2–12)
NEUTROPHILS NFR BLD: 96 % (ref 43–80)
NEUTS SEG NFR BLD: 6.55 K/UL (ref 1.8–7.3)
PHOSPHATE SERPL-MCNC: 3.1 MG/DL (ref 2.5–4.5)
PLATELET # BLD AUTO: 184 K/UL (ref 130–450)
PMV BLD AUTO: 11.1 FL (ref 7–12)
POTASSIUM SERPL-SCNC: 3.6 MMOL/L (ref 3.5–5)
PROCALCITONIN SERPL-MCNC: 0.07 NG/ML (ref 0–0.08)
RBC # BLD AUTO: 4.76 M/UL (ref 3.8–5.8)
RBC # BLD: ABNORMAL 10*6/UL
RBC # BLD: ABNORMAL 10*6/UL
SODIUM SERPL-SCNC: 139 MMOL/L (ref 132–146)
WBC OTHER # BLD: 6.8 K/UL (ref 4.5–11.5)

## 2025-01-14 PROCEDURE — 85025 COMPLETE CBC W/AUTO DIFF WBC: CPT

## 2025-01-14 PROCEDURE — 6360000002 HC RX W HCPCS: Performed by: INTERNAL MEDICINE

## 2025-01-14 PROCEDURE — 99222 1ST HOSP IP/OBS MODERATE 55: CPT | Performed by: NURSE PRACTITIONER

## 2025-01-14 PROCEDURE — 94640 AIRWAY INHALATION TREATMENT: CPT

## 2025-01-14 PROCEDURE — 2060000000 HC ICU INTERMEDIATE R&B

## 2025-01-14 PROCEDURE — 99232 SBSQ HOSP IP/OBS MODERATE 35: CPT | Performed by: INTERNAL MEDICINE

## 2025-01-14 PROCEDURE — 6360000002 HC RX W HCPCS

## 2025-01-14 PROCEDURE — 84145 PROCALCITONIN (PCT): CPT

## 2025-01-14 PROCEDURE — 84100 ASSAY OF PHOSPHORUS: CPT

## 2025-01-14 PROCEDURE — 2500000003 HC RX 250 WO HCPCS: Performed by: INTERNAL MEDICINE

## 2025-01-14 PROCEDURE — 6370000000 HC RX 637 (ALT 250 FOR IP)

## 2025-01-14 PROCEDURE — 80048 BASIC METABOLIC PNL TOTAL CA: CPT

## 2025-01-14 PROCEDURE — 2500000003 HC RX 250 WO HCPCS

## 2025-01-14 RX ORDER — ARFORMOTEROL TARTRATE 15 UG/2ML
15 SOLUTION RESPIRATORY (INHALATION)
Status: DISCONTINUED | OUTPATIENT
Start: 2025-01-14 | End: 2025-01-27 | Stop reason: HOSPADM

## 2025-01-14 RX ORDER — BUMETANIDE 0.25 MG/ML
0.5 INJECTION, SOLUTION INTRAMUSCULAR; INTRAVENOUS ONCE
Status: COMPLETED | OUTPATIENT
Start: 2025-01-14 | End: 2025-01-14

## 2025-01-14 RX ORDER — BUDESONIDE 0.5 MG/2ML
0.5 INHALANT ORAL
Status: DISCONTINUED | OUTPATIENT
Start: 2025-01-14 | End: 2025-01-27 | Stop reason: HOSPADM

## 2025-01-14 RX ADMIN — BUMETANIDE 0.5 MG: 0.25 INJECTION INTRAMUSCULAR; INTRAVENOUS at 12:10

## 2025-01-14 RX ADMIN — BUDESONIDE INHALATION 500 MCG: 0.5 SUSPENSION RESPIRATORY (INHALATION) at 09:55

## 2025-01-14 RX ADMIN — METHYLPREDNISOLONE SODIUM SUCCINATE 40 MG: 40 INJECTION INTRAMUSCULAR; INTRAVENOUS at 01:41

## 2025-01-14 RX ADMIN — CEFEPIME 2000 MG: 2 INJECTION, POWDER, FOR SOLUTION INTRAVENOUS at 12:10

## 2025-01-14 RX ADMIN — BUDESONIDE INHALATION 500 MCG: 0.5 SUSPENSION RESPIRATORY (INHALATION) at 19:57

## 2025-01-14 RX ADMIN — SODIUM CHLORIDE, PRESERVATIVE FREE 10 ML: 5 INJECTION INTRAVENOUS at 08:29

## 2025-01-14 RX ADMIN — ALBUTEROL SULFATE 2.5 MG: 2.5 SOLUTION RESPIRATORY (INHALATION) at 07:34

## 2025-01-14 RX ADMIN — IPRATROPIUM BROMIDE AND ALBUTEROL SULFATE 1 DOSE: 2.5; .5 SOLUTION RESPIRATORY (INHALATION) at 19:57

## 2025-01-14 RX ADMIN — IPRATROPIUM BROMIDE AND ALBUTEROL SULFATE 1 DOSE: 2.5; .5 SOLUTION RESPIRATORY (INHALATION) at 15:39

## 2025-01-14 RX ADMIN — IPRATROPIUM BROMIDE AND ALBUTEROL SULFATE 1 DOSE: 2.5; .5 SOLUTION RESPIRATORY (INHALATION) at 07:34

## 2025-01-14 RX ADMIN — METHYLPREDNISOLONE SODIUM SUCCINATE 40 MG: 40 INJECTION INTRAMUSCULAR; INTRAVENOUS at 18:07

## 2025-01-14 RX ADMIN — ENOXAPARIN SODIUM 40 MG: 100 INJECTION SUBCUTANEOUS at 08:28

## 2025-01-14 RX ADMIN — ARFORMOTEROL TARTRATE 15 MCG: 15 SOLUTION RESPIRATORY (INHALATION) at 09:55

## 2025-01-14 RX ADMIN — METHYLPREDNISOLONE SODIUM SUCCINATE 40 MG: 40 INJECTION INTRAMUSCULAR; INTRAVENOUS at 06:23

## 2025-01-14 RX ADMIN — IPRATROPIUM BROMIDE AND ALBUTEROL SULFATE 1 DOSE: 2.5; .5 SOLUTION RESPIRATORY (INHALATION) at 13:47

## 2025-01-14 RX ADMIN — ARFORMOTEROL TARTRATE 15 MCG: 15 SOLUTION RESPIRATORY (INHALATION) at 19:57

## 2025-01-14 RX ADMIN — METHYLPREDNISOLONE SODIUM SUCCINATE 40 MG: 40 INJECTION INTRAMUSCULAR; INTRAVENOUS at 12:10

## 2025-01-14 ASSESSMENT — PAIN SCALES - GENERAL: PAINLEVEL_OUTOF10: 0

## 2025-01-14 NOTE — PLAN OF CARE
Patient's chart updated to reflect:      .    - HF care plan, HF education points and HF discharge instructions.  -Orders: 2 gram sodium diet, daily weights, I/O.  -PCP and cardiology follow up appointments to be scheduled within 7 days of hospital discharge.  -CHF education session will be provided to the patient prior to hospital discharge.    Alysha Ray RN   Heart Failure Navigator

## 2025-01-14 NOTE — PLAN OF CARE
Problem: ABCDS Injury Assessment  Goal: Absence of physical injury  Outcome: Progressing     Problem: Discharge Planning  Goal: Discharge to home or other facility with appropriate resources  Outcome: Progressing  Flowsheets (Taken 1/13/2025 2000)  Discharge to home or other facility with appropriate resources: Identify barriers to discharge with patient and caregiver     Problem: Chronic Conditions and Co-morbidities  Goal: Patient's chronic conditions and co-morbidity symptoms are monitored and maintained or improved  Outcome: Progressing  Flowsheets (Taken 1/13/2025 2000)  Care Plan - Patient's Chronic Conditions and Co-Morbidity Symptoms are Monitored and Maintained or Improved: Monitor and assess patient's chronic conditions and comorbid symptoms for stability, deterioration, or improvement

## 2025-01-14 NOTE — ED NOTES
.ED to Inpatient Handoff Report    Notified floor that electronic handoff available and patient ready for transport to room 638.    Safety Risks: Risk of falls    Patient in Restraints: no    Constant Observer or Patient : no    Telemetry Monitoring Ordered: Yes          Order to transfer to unit without monitor: NO    Last MEWS: 2 Time completed: 1925    Deterioration Index: 35.7    Vitals:    01/13/25 1401 01/13/25 1747 01/13/25 1813 01/13/25 1925   BP: 97/65 102/77  111/79   Pulse: 94 91 95 (!) 104   Resp: 16 20  20   Temp:  97.8 °F (36.6 °C)  97.8 °F (36.6 °C)   TempSrc:  Oral  Oral   SpO2: 96% 95% 90% 91%   Weight:       Height:           Opportunity for questions and clarification was provided.

## 2025-01-14 NOTE — CARE COORDINATION
Pt w/COPD exac on IV solumedrol w/pulm following. IV bumex x 1 w/HF nurse following. CM met w/pt w/role of CM explained. Pt resides w/spouse and is independent w/o the use of any assistive devices. He has a cane and walker if needed. Home O2 via [employer], CM is awaiting a return call for order confirmation. Pt states he has increased his O2 to 7-8L at home. He also has a nebulizer and chest vest. Pt is established w/Dr. Paredes and uses [facility]. Pt denies any needs for CM and will return home. Will follow.  MARIBEL Colon, RN  [facility] Case Management  [phone]      [facility] confirms pt's O2 order is for 3L NC. Pt has a portable concentrator that only goes up to 5L. Pt will need new orders for increased O2 needs/equipment.

## 2025-01-14 NOTE — ACP (ADVANCE CARE PLANNING)
Advance Care Planning   Healthcare Decision Maker:    Primary Decision Maker: Tara Cox - [facility] - [phone]      Today we documented Decision Maker(s) consistent with Legal Next of Kin hierarchy.

## 2025-01-14 NOTE — DISCHARGE INSTRUCTIONS
weight gain of 3 pounds or more in 1 day         OR 5 pounds or more in one week  More shortness of breath  More swelling of your stomach, legs, ankles or feet  Feeling more tired, No energy  Dry hacky cough  Dizziness  More chest pain / discomfort       (CALL 882 IF ANY OF THE FOLLOWING OCCURS  Chest pain (not relieved with nitroglycerine, if you have been prescribed this medication)  Severe shortness of breath  Faint / Pass out  Confusion / cannot think clearly  If symptoms get worse           SMOKING - TOBACCO USE:  * IF YOU SMOKE - STOP!  Kick the habit.  [facility] Program is offered at [facility] and [facility]. Call [phone] extension [phone] for more information.             ACTIVITY:   (Ask your doctor when you will be able to return to work and before starting any exercise program.  Do not drive unless unless your doctor has given you permission to do so).  Start light exercise.  Even if you can only do a small amount, exercise will help you get stronger, have more energy, help manage your weight and decrease  stress. Walking is an easy way to get exercise. Start out slowly and  increase the amount you walk as tolerated  If you become short of breath, dizzy or have chest pain; stop, sit down, and rest.  If you feel \"wiped out\" the day after you exercise, walk at a slower pace or for a shorter distance. You can gradually increase the pace or amount of time.            (Do not exercise right after a meal or in extreme temperatures, such as above 85 degrees, if the air is really humid, or wind chill is less than 20 degrees)                                             ADDITIONAL INFORMATION:  Avoid getting sick from colds and the flu. Stay away from friends or family that you know may have a contagious illness  Get plenty of rest   Get a flu shot each year.  Get a pneumococcal vaccine shot. If you have had one before, ask your

## 2025-01-14 NOTE — PLAN OF CARE
Problem: ABCDS Injury Assessment  Goal: Absence of physical injury  1/14/2025 1021 by Aimee Puente RN  Outcome: Progressing  1/13/2025 2322 by Evelia Duran RN  Outcome: Progressing     Problem: Discharge Planning  Goal: Discharge to home or other facility with appropriate resources  1/14/2025 1021 by Aimee Puente RN  Outcome: Progressing  1/13/2025 2322 by Evelia Duran RN  Outcome: Progressing  Flowsheets (Taken 1/13/2025 2000)  Discharge to home or other facility with appropriate resources: Identify barriers to discharge with patient and caregiver     Problem: Chronic Conditions and Co-morbidities  Goal: Patient's chronic conditions and co-morbidity symptoms are monitored and maintained or improved  1/14/2025 1021 by Aimee Puente RN  Outcome: Progressing  1/13/2025 2322 by Evelia Duran RN  Outcome: Progressing  Flowsheets (Taken 1/13/2025 2000)  Care Plan - Patient's Chronic Conditions and Co-Morbidity Symptoms are Monitored and Maintained or Improved: Monitor and assess patient's chronic conditions and comorbid symptoms for stability, deterioration, or improvement     Problem: Safety - Adult  Goal: Free from fall injury  Outcome: Progressing     Problem: Pain  Goal: Verbalizes/displays adequate comfort level or baseline comfort level  Outcome: Progressing

## 2025-01-15 LAB
ANION GAP SERPL CALCULATED.3IONS-SCNC: 12 MMOL/L (ref 7–16)
BASOPHILS # BLD: 0.01 K/UL (ref 0–0.2)
BASOPHILS NFR BLD: 0 % (ref 0–2)
BUN SERPL-MCNC: 16 MG/DL (ref 6–23)
CALCIUM SERPL-MCNC: 9.3 MG/DL (ref 8.6–10.2)
CHLORIDE SERPL-SCNC: 103 MMOL/L (ref 98–107)
CO2 SERPL-SCNC: 24 MMOL/L (ref 22–29)
CREAT SERPL-MCNC: 0.7 MG/DL (ref 0.7–1.2)
EOSINOPHIL # BLD: 0 K/UL (ref 0.05–0.5)
EOSINOPHILS RELATIVE PERCENT: 0 % (ref 0–6)
ERYTHROCYTE [DISTWIDTH] IN BLOOD BY AUTOMATED COUNT: 14.6 % (ref 11.5–15)
GFR, ESTIMATED: >90 ML/MIN/1.73M2
GLUCOSE SERPL-MCNC: 120 MG/DL (ref 74–99)
HCT VFR BLD AUTO: 43.9 % (ref 37–54)
HGB BLD-MCNC: 14 G/DL (ref 12.5–16.5)
IMM GRANULOCYTES # BLD AUTO: 0.05 K/UL (ref 0–0.58)
IMM GRANULOCYTES NFR BLD: 1 % (ref 0–5)
LYMPHOCYTES NFR BLD: 0.2 K/UL (ref 1.5–4)
LYMPHOCYTES RELATIVE PERCENT: 2 % (ref 20–42)
MCH RBC QN AUTO: 31.5 PG (ref 26–35)
MCHC RBC AUTO-ENTMCNC: 31.9 G/DL (ref 32–34.5)
MCV RBC AUTO: 98.7 FL (ref 80–99.9)
MONOCYTES NFR BLD: 0.27 K/UL (ref 0.1–0.95)
MONOCYTES NFR BLD: 3 % (ref 2–12)
NEUTROPHILS NFR BLD: 94 % (ref 43–80)
NEUTS SEG NFR BLD: 8.67 K/UL (ref 1.8–7.3)
PHOSPHATE SERPL-MCNC: 3.3 MG/DL (ref 2.5–4.5)
PLATELET # BLD AUTO: 165 K/UL (ref 130–450)
PMV BLD AUTO: 10.8 FL (ref 7–12)
POTASSIUM SERPL-SCNC: 3.7 MMOL/L (ref 3.5–5)
PROCALCITONIN SERPL-MCNC: 0.05 NG/ML (ref 0–0.08)
RBC # BLD AUTO: 4.45 M/UL (ref 3.8–5.8)
RBC # BLD: ABNORMAL 10*6/UL
SODIUM SERPL-SCNC: 139 MMOL/L (ref 132–146)
WBC OTHER # BLD: 9.2 K/UL (ref 4.5–11.5)

## 2025-01-15 PROCEDURE — 2060000000 HC ICU INTERMEDIATE R&B

## 2025-01-15 PROCEDURE — 6360000002 HC RX W HCPCS

## 2025-01-15 PROCEDURE — 6360000002 HC RX W HCPCS: Performed by: INTERNAL MEDICINE

## 2025-01-15 PROCEDURE — 2580000003 HC RX 258: Performed by: INTERNAL MEDICINE

## 2025-01-15 PROCEDURE — 80048 BASIC METABOLIC PNL TOTAL CA: CPT

## 2025-01-15 PROCEDURE — 36415 COLL VENOUS BLD VENIPUNCTURE: CPT

## 2025-01-15 PROCEDURE — 94640 AIRWAY INHALATION TREATMENT: CPT

## 2025-01-15 PROCEDURE — 6370000000 HC RX 637 (ALT 250 FOR IP)

## 2025-01-15 PROCEDURE — 84100 ASSAY OF PHOSPHORUS: CPT

## 2025-01-15 PROCEDURE — 84145 PROCALCITONIN (PCT): CPT

## 2025-01-15 PROCEDURE — 85025 COMPLETE CBC W/AUTO DIFF WBC: CPT

## 2025-01-15 PROCEDURE — 99232 SBSQ HOSP IP/OBS MODERATE 35: CPT | Performed by: INTERNAL MEDICINE

## 2025-01-15 PROCEDURE — 2500000003 HC RX 250 WO HCPCS

## 2025-01-15 RX ORDER — BUMETANIDE 0.25 MG/ML
0.5 INJECTION, SOLUTION INTRAMUSCULAR; INTRAVENOUS DAILY
Status: DISCONTINUED | OUTPATIENT
Start: 2025-01-15 | End: 2025-01-16

## 2025-01-15 RX ADMIN — CEFEPIME 2000 MG: 2 INJECTION, POWDER, FOR SOLUTION INTRAVENOUS at 00:14

## 2025-01-15 RX ADMIN — ARFORMOTEROL TARTRATE 15 MCG: 15 SOLUTION RESPIRATORY (INHALATION) at 10:28

## 2025-01-15 RX ADMIN — BUMETANIDE 0.5 MG: 0.25 INJECTION INTRAMUSCULAR; INTRAVENOUS at 12:15

## 2025-01-15 RX ADMIN — SODIUM CHLORIDE, PRESERVATIVE FREE 10 ML: 5 INJECTION INTRAVENOUS at 08:43

## 2025-01-15 RX ADMIN — IPRATROPIUM BROMIDE AND ALBUTEROL SULFATE 1 DOSE: 2.5; .5 SOLUTION RESPIRATORY (INHALATION) at 15:42

## 2025-01-15 RX ADMIN — IPRATROPIUM BROMIDE AND ALBUTEROL SULFATE 1 DOSE: 2.5; .5 SOLUTION RESPIRATORY (INHALATION) at 10:28

## 2025-01-15 RX ADMIN — BUDESONIDE INHALATION 500 MCG: 0.5 SUSPENSION RESPIRATORY (INHALATION) at 10:28

## 2025-01-15 RX ADMIN — ENOXAPARIN SODIUM 40 MG: 100 INJECTION SUBCUTANEOUS at 08:43

## 2025-01-15 RX ADMIN — SODIUM CHLORIDE, PRESERVATIVE FREE 10 ML: 5 INJECTION INTRAVENOUS at 00:20

## 2025-01-15 RX ADMIN — IPRATROPIUM BROMIDE AND ALBUTEROL SULFATE 1 DOSE: 2.5; .5 SOLUTION RESPIRATORY (INHALATION) at 19:58

## 2025-01-15 RX ADMIN — METHYLPREDNISOLONE SODIUM SUCCINATE 40 MG: 40 INJECTION INTRAMUSCULAR; INTRAVENOUS at 00:04

## 2025-01-15 RX ADMIN — CEFEPIME 2000 MG: 2 INJECTION, POWDER, FOR SOLUTION INTRAVENOUS at 12:19

## 2025-01-15 RX ADMIN — METHYLPREDNISOLONE SODIUM SUCCINATE 40 MG: 40 INJECTION INTRAMUSCULAR; INTRAVENOUS at 12:15

## 2025-01-15 RX ADMIN — ARFORMOTEROL TARTRATE 15 MCG: 15 SOLUTION RESPIRATORY (INHALATION) at 19:58

## 2025-01-15 RX ADMIN — BUDESONIDE INHALATION 500 MCG: 0.5 SUSPENSION RESPIRATORY (INHALATION) at 19:58

## 2025-01-15 RX ADMIN — METHYLPREDNISOLONE SODIUM SUCCINATE 40 MG: 40 INJECTION INTRAMUSCULAR; INTRAVENOUS at 05:42

## 2025-01-15 RX ADMIN — SODIUM CHLORIDE, PRESERVATIVE FREE 10 ML: 5 INJECTION INTRAVENOUS at 21:42

## 2025-01-15 ASSESSMENT — PAIN SCALES - GENERAL
PAINLEVEL_OUTOF10: 0
PAINLEVEL_OUTOF10: 0

## 2025-01-15 NOTE — PLAN OF CARE
Problem: ABCDS Injury Assessment  Goal: Absence of physical injury  Outcome: Progressing     Problem: Discharge Planning  Goal: Discharge to home or other facility with appropriate resources  1/15/2025 1014 by Aimee Puente RN  Outcome: Progressing  1/14/2025 2340 by Evelia Duran RN  Outcome: Progressing  Flowsheets (Taken 1/14/2025 1950)  Discharge to home or other facility with appropriate resources: Identify discharge learning needs (meds, wound care, etc)     Problem: Chronic Conditions and Co-morbidities  Goal: Patient's chronic conditions and co-morbidity symptoms are monitored and maintained or improved  1/15/2025 1014 by Aimee Puente RN  Outcome: Progressing  1/14/2025 2340 by Evelia Duran RN  Outcome: Progressing  Flowsheets (Taken 1/14/2025 1950)  Care Plan - Patient's Chronic Conditions and Co-Morbidity Symptoms are Monitored and Maintained or Improved: Monitor and assess patient's chronic conditions and comorbid symptoms for stability, deterioration, or improvement     Problem: Safety - Adult  Goal: Free from fall injury  1/15/2025 1014 by Aimee Puente RN  Outcome: Progressing  1/14/2025 2340 by Evelia Duran RN  Outcome: Progressing     Problem: Pain  Goal: Verbalizes/displays adequate comfort level or baseline comfort level  Outcome: Progressing

## 2025-01-15 NOTE — PLAN OF CARE
Problem: ABCDS Injury Assessment  Goal: Absence of physical injury  1/14/2025 1021 by Aimee Puente RN  Outcome: Progressing     Problem: Discharge Planning  Goal: Discharge to home or other facility with appropriate resources  1/14/2025 2340 by Evelia Duran RN  Outcome: Progressing  Flowsheets (Taken 1/14/2025 1950)  Discharge to home or other facility with appropriate resources: Identify discharge learning needs (meds, wound care, etc)  1/14/2025 1021 by Aimee Puente RN  Outcome: Progressing     Problem: Chronic Conditions and Co-morbidities  Goal: Patient's chronic conditions and co-morbidity symptoms are monitored and maintained or improved  1/14/2025 2340 by Evelia Duran RN  Outcome: Progressing  Flowsheets (Taken 1/14/2025 1950)  Care Plan - Patient's Chronic Conditions and Co-Morbidity Symptoms are Monitored and Maintained or Improved: Monitor and assess patient's chronic conditions and comorbid symptoms for stability, deterioration, or improvement  1/14/2025 1021 by Aimee Puente RN  Outcome: Progressing     Problem: Safety - Adult  Goal: Free from fall injury  1/14/2025 2340 by Evelia Duran RN  Outcome: Progressing  1/14/2025 1021 by Aimee Puente RN  Outcome: Progressing     Problem: Pain  Goal: Verbalizes/displays adequate comfort level or baseline comfort level  1/14/2025 1021 by Aimee Puente RN  Outcome: Progressing

## 2025-01-16 ENCOUNTER — APPOINTMENT (OUTPATIENT)
Dept: GENERAL RADIOLOGY | Age: 75
DRG: 189 | End: 2025-01-16
Payer: MEDICARE

## 2025-01-16 LAB
ANION GAP SERPL CALCULATED.3IONS-SCNC: 11 MMOL/L (ref 7–16)
BASOPHILS # BLD: 0.01 K/UL (ref 0–0.2)
BASOPHILS NFR BLD: 0 % (ref 0–2)
BNP SERPL-MCNC: ABNORMAL PG/ML (ref 0–125)
BUN SERPL-MCNC: 18 MG/DL (ref 6–23)
CALCIUM SERPL-MCNC: 9.4 MG/DL (ref 8.6–10.2)
CHLORIDE SERPL-SCNC: 102 MMOL/L (ref 98–107)
CO2 SERPL-SCNC: 27 MMOL/L (ref 22–29)
CREAT SERPL-MCNC: 0.7 MG/DL (ref 0.7–1.2)
D-DIMER QUANTITATIVE: 451 NG/ML DDU (ref 0–230)
EKG ATRIAL RATE: 97 BPM
EKG Q-T INTERVAL: 408 MS
EKG QRS DURATION: 102 MS
EKG QTC CALCULATION (BAZETT): 518 MS
EKG R AXIS: 132 DEGREES
EKG T AXIS: -34 DEGREES
EKG VENTRICULAR RATE: 97 BPM
EOSINOPHIL # BLD: 0 K/UL (ref 0.05–0.5)
EOSINOPHILS RELATIVE PERCENT: 0 % (ref 0–6)
ERYTHROCYTE [DISTWIDTH] IN BLOOD BY AUTOMATED COUNT: 14.7 % (ref 11.5–15)
GFR, ESTIMATED: >90 ML/MIN/1.73M2
GLUCOSE SERPL-MCNC: 88 MG/DL (ref 74–99)
HCT VFR BLD AUTO: 48.2 % (ref 37–54)
HGB BLD-MCNC: 15.2 G/DL (ref 12.5–16.5)
IMM GRANULOCYTES # BLD AUTO: 0.05 K/UL (ref 0–0.58)
IMM GRANULOCYTES NFR BLD: 1 % (ref 0–5)
LYMPHOCYTES NFR BLD: 0.61 K/UL (ref 1.5–4)
LYMPHOCYTES RELATIVE PERCENT: 6 % (ref 20–42)
MCH RBC QN AUTO: 30.8 PG (ref 26–35)
MCHC RBC AUTO-ENTMCNC: 31.5 G/DL (ref 32–34.5)
MCV RBC AUTO: 97.6 FL (ref 80–99.9)
MONOCYTES NFR BLD: 0.66 K/UL (ref 0.1–0.95)
MONOCYTES NFR BLD: 6 % (ref 2–12)
NEUTROPHILS NFR BLD: 88 % (ref 43–80)
NEUTS SEG NFR BLD: 9.49 K/UL (ref 1.8–7.3)
PHOSPHATE SERPL-MCNC: 2.9 MG/DL (ref 2.5–4.5)
PLATELET # BLD AUTO: 225 K/UL (ref 130–450)
PMV BLD AUTO: 11 FL (ref 7–12)
POTASSIUM SERPL-SCNC: 4 MMOL/L (ref 3.5–5)
PROCALCITONIN SERPL-MCNC: 0.05 NG/ML (ref 0–0.08)
RBC # BLD AUTO: 4.94 M/UL (ref 3.8–5.8)
SODIUM SERPL-SCNC: 140 MMOL/L (ref 132–146)
WBC OTHER # BLD: 10.8 K/UL (ref 4.5–11.5)

## 2025-01-16 PROCEDURE — 83880 ASSAY OF NATRIURETIC PEPTIDE: CPT

## 2025-01-16 PROCEDURE — 84100 ASSAY OF PHOSPHORUS: CPT

## 2025-01-16 PROCEDURE — 6370000000 HC RX 637 (ALT 250 FOR IP)

## 2025-01-16 PROCEDURE — 99232 SBSQ HOSP IP/OBS MODERATE 35: CPT | Performed by: STUDENT IN AN ORGANIZED HEALTH CARE EDUCATION/TRAINING PROGRAM

## 2025-01-16 PROCEDURE — 2060000000 HC ICU INTERMEDIATE R&B

## 2025-01-16 PROCEDURE — 71046 X-RAY EXAM CHEST 2 VIEWS: CPT

## 2025-01-16 PROCEDURE — 36415 COLL VENOUS BLD VENIPUNCTURE: CPT

## 2025-01-16 PROCEDURE — 6360000002 HC RX W HCPCS: Performed by: INTERNAL MEDICINE

## 2025-01-16 PROCEDURE — 2580000003 HC RX 258: Performed by: INTERNAL MEDICINE

## 2025-01-16 PROCEDURE — 85379 FIBRIN DEGRADATION QUANT: CPT

## 2025-01-16 PROCEDURE — 2700000000 HC OXYGEN THERAPY PER DAY

## 2025-01-16 PROCEDURE — 85025 COMPLETE CBC W/AUTO DIFF WBC: CPT

## 2025-01-16 PROCEDURE — 84145 PROCALCITONIN (PCT): CPT

## 2025-01-16 PROCEDURE — 6360000002 HC RX W HCPCS

## 2025-01-16 PROCEDURE — 2500000003 HC RX 250 WO HCPCS

## 2025-01-16 PROCEDURE — 99231 SBSQ HOSP IP/OBS SF/LOW 25: CPT | Performed by: NURSE PRACTITIONER

## 2025-01-16 PROCEDURE — 94640 AIRWAY INHALATION TREATMENT: CPT

## 2025-01-16 PROCEDURE — 80048 BASIC METABOLIC PNL TOTAL CA: CPT

## 2025-01-16 RX ORDER — POTASSIUM CHLORIDE 1500 MG/1
20 TABLET, EXTENDED RELEASE ORAL DAILY
Status: DISCONTINUED | OUTPATIENT
Start: 2025-01-16 | End: 2025-01-27 | Stop reason: HOSPADM

## 2025-01-16 RX ORDER — OXYCODONE HYDROCHLORIDE 5 MG/1
2.5 TABLET ORAL EVERY 12 HOURS PRN
Status: DISCONTINUED | OUTPATIENT
Start: 2025-01-16 | End: 2025-01-22

## 2025-01-16 RX ORDER — ENOXAPARIN SODIUM 100 MG/ML
1 INJECTION SUBCUTANEOUS DAILY
Status: DISCONTINUED | OUTPATIENT
Start: 2025-01-17 | End: 2025-01-17 | Stop reason: DRUGHIGH

## 2025-01-16 RX ORDER — OXYCODONE AND ACETAMINOPHEN 7.5; 325 MG/1; MG/1
1 TABLET ORAL 2 TIMES DAILY PRN
Status: DISCONTINUED | OUTPATIENT
Start: 2025-01-16 | End: 2025-01-16

## 2025-01-16 RX ORDER — BUMETANIDE 0.25 MG/ML
1 INJECTION, SOLUTION INTRAMUSCULAR; INTRAVENOUS 2 TIMES DAILY
Status: DISCONTINUED | OUTPATIENT
Start: 2025-01-16 | End: 2025-01-27 | Stop reason: HOSPADM

## 2025-01-16 RX ORDER — OXYCODONE AND ACETAMINOPHEN 5; 325 MG/1; MG/1
1 TABLET ORAL EVERY 12 HOURS PRN
Status: DISCONTINUED | OUTPATIENT
Start: 2025-01-16 | End: 2025-01-22

## 2025-01-16 RX ADMIN — POTASSIUM CHLORIDE 20 MEQ: 20 TABLET, EXTENDED RELEASE ORAL at 10:54

## 2025-01-16 RX ADMIN — IPRATROPIUM BROMIDE AND ALBUTEROL SULFATE 1 DOSE: 2.5; .5 SOLUTION RESPIRATORY (INHALATION) at 19:52

## 2025-01-16 RX ADMIN — ACETAMINOPHEN 650 MG: 325 TABLET ORAL at 03:05

## 2025-01-16 RX ADMIN — BUMETANIDE 0.5 MG: 0.25 INJECTION INTRAMUSCULAR; INTRAVENOUS at 08:51

## 2025-01-16 RX ADMIN — BUMETANIDE 1 MG: 0.25 INJECTION INTRAMUSCULAR; INTRAVENOUS at 17:00

## 2025-01-16 RX ADMIN — SODIUM CHLORIDE, PRESERVATIVE FREE 10 ML: 5 INJECTION INTRAVENOUS at 08:52

## 2025-01-16 RX ADMIN — ARFORMOTEROL TARTRATE 15 MCG: 15 SOLUTION RESPIRATORY (INHALATION) at 19:52

## 2025-01-16 RX ADMIN — OXYCODONE HYDROCHLORIDE AND ACETAMINOPHEN 1 TABLET: 5; 325 TABLET ORAL at 10:54

## 2025-01-16 RX ADMIN — ENOXAPARIN SODIUM 40 MG: 100 INJECTION SUBCUTANEOUS at 08:51

## 2025-01-16 RX ADMIN — BUDESONIDE INHALATION 500 MCG: 0.5 SUSPENSION RESPIRATORY (INHALATION) at 19:52

## 2025-01-16 RX ADMIN — PREDNISONE 40 MG: 20 TABLET ORAL at 08:50

## 2025-01-16 RX ADMIN — CEFEPIME 2000 MG: 2 INJECTION, POWDER, FOR SOLUTION INTRAVENOUS at 00:20

## 2025-01-16 RX ADMIN — IPRATROPIUM BROMIDE AND ALBUTEROL SULFATE 1 DOSE: 2.5; .5 SOLUTION RESPIRATORY (INHALATION) at 15:43

## 2025-01-16 RX ADMIN — SODIUM CHLORIDE, PRESERVATIVE FREE 10 ML: 5 INJECTION INTRAVENOUS at 20:20

## 2025-01-16 RX ADMIN — CEFEPIME 2000 MG: 2 INJECTION, POWDER, FOR SOLUTION INTRAVENOUS at 12:04

## 2025-01-16 RX ADMIN — ARFORMOTEROL TARTRATE 15 MCG: 15 SOLUTION RESPIRATORY (INHALATION) at 08:03

## 2025-01-16 RX ADMIN — IPRATROPIUM BROMIDE AND ALBUTEROL SULFATE 1 DOSE: 2.5; .5 SOLUTION RESPIRATORY (INHALATION) at 08:03

## 2025-01-16 RX ADMIN — IPRATROPIUM BROMIDE AND ALBUTEROL SULFATE 1 DOSE: 2.5; .5 SOLUTION RESPIRATORY (INHALATION) at 11:51

## 2025-01-16 RX ADMIN — BUDESONIDE INHALATION 500 MCG: 0.5 SUSPENSION RESPIRATORY (INHALATION) at 08:03

## 2025-01-16 ASSESSMENT — PAIN SCALES - GENERAL
PAINLEVEL_OUTOF10: 0
PAINLEVEL_OUTOF10: 0
PAINLEVEL_OUTOF10: 7
PAINLEVEL_OUTOF10: 0
PAINLEVEL_OUTOF10: 8

## 2025-01-16 ASSESSMENT — PAIN DESCRIPTION - DESCRIPTORS
DESCRIPTORS: ACHING
DESCRIPTORS: ACHING;DISCOMFORT

## 2025-01-16 ASSESSMENT — PAIN DESCRIPTION - ORIENTATION: ORIENTATION: RIGHT;LEFT

## 2025-01-16 ASSESSMENT — PAIN SCALES - WONG BAKER
WONGBAKER_NUMERICALRESPONSE: NO HURT
WONGBAKER_NUMERICALRESPONSE: NO HURT

## 2025-01-16 ASSESSMENT — PAIN DESCRIPTION - LOCATION
LOCATION: NECK;SHOULDER
LOCATION: NECK

## 2025-01-16 NOTE — PLAN OF CARE
Problem: ABCDS Injury Assessment  Goal: Absence of physical injury  1/16/2025 1139 by Fredi Hernandez RN  Outcome: Progressing  1/16/2025 0122 by Hortencia Cintron RN  Outcome: Progressing     Problem: Discharge Planning  Goal: Discharge to home or other facility with appropriate resources  1/16/2025 1139 by Fredi Hernandez RN  Outcome: Progressing  1/16/2025 0122 by Hortencia Cintron RN  Outcome: Progressing     Problem: Chronic Conditions and Co-morbidities  Goal: Patient's chronic conditions and co-morbidity symptoms are monitored and maintained or improved  1/16/2025 1139 by Fredi Hernandez RN  Outcome: Progressing  1/16/2025 0122 by Hortencia Cintron RN  Outcome: Progressing     Problem: Safety - Adult  Goal: Free from fall injury  Outcome: Progressing     Problem: Pain  Goal: Verbalizes/displays adequate comfort level or baseline comfort level  Outcome: Progressing

## 2025-01-17 LAB
ANION GAP SERPL CALCULATED.3IONS-SCNC: 12 MMOL/L (ref 7–16)
BASOPHILS # BLD: 0 K/UL (ref 0–0.2)
BASOPHILS NFR BLD: 0 % (ref 0–2)
BUN SERPL-MCNC: 25 MG/DL (ref 6–23)
CALCIUM SERPL-MCNC: 9.2 MG/DL (ref 8.6–10.2)
CHLORIDE SERPL-SCNC: 98 MMOL/L (ref 98–107)
CO2 SERPL-SCNC: 28 MMOL/L (ref 22–29)
CREAT SERPL-MCNC: 0.8 MG/DL (ref 0.7–1.2)
EOSINOPHIL # BLD: 0.02 K/UL (ref 0.05–0.5)
EOSINOPHILS RELATIVE PERCENT: 0 % (ref 0–6)
ERYTHROCYTE [DISTWIDTH] IN BLOOD BY AUTOMATED COUNT: 14.5 % (ref 11.5–15)
GFR, ESTIMATED: >90 ML/MIN/1.73M2
GLUCOSE SERPL-MCNC: 102 MG/DL (ref 74–99)
HCT VFR BLD AUTO: 45.5 % (ref 37–54)
HGB BLD-MCNC: 14.5 G/DL (ref 12.5–16.5)
IMM GRANULOCYTES # BLD AUTO: 0.05 K/UL (ref 0–0.58)
IMM GRANULOCYTES NFR BLD: 1 % (ref 0–5)
LYMPHOCYTES NFR BLD: 1.03 K/UL (ref 1.5–4)
LYMPHOCYTES RELATIVE PERCENT: 13 % (ref 20–42)
MCH RBC QN AUTO: 31 PG (ref 26–35)
MCHC RBC AUTO-ENTMCNC: 31.9 G/DL (ref 32–34.5)
MCV RBC AUTO: 97.4 FL (ref 80–99.9)
MONOCYTES NFR BLD: 0.72 K/UL (ref 0.1–0.95)
MONOCYTES NFR BLD: 9 % (ref 2–12)
NEUTROPHILS NFR BLD: 77 % (ref 43–80)
NEUTS SEG NFR BLD: 5.98 K/UL (ref 1.8–7.3)
PLATELET # BLD AUTO: 196 K/UL (ref 130–450)
PMV BLD AUTO: 10.9 FL (ref 7–12)
POTASSIUM SERPL-SCNC: 4.3 MMOL/L (ref 3.5–5)
RBC # BLD AUTO: 4.67 M/UL (ref 3.8–5.8)
SODIUM SERPL-SCNC: 138 MMOL/L (ref 132–146)
WBC OTHER # BLD: 7.8 K/UL (ref 4.5–11.5)

## 2025-01-17 PROCEDURE — 6360000002 HC RX W HCPCS: Performed by: INTERNAL MEDICINE

## 2025-01-17 PROCEDURE — 6360000002 HC RX W HCPCS

## 2025-01-17 PROCEDURE — 80048 BASIC METABOLIC PNL TOTAL CA: CPT

## 2025-01-17 PROCEDURE — 6370000000 HC RX 637 (ALT 250 FOR IP): Performed by: INTERNAL MEDICINE

## 2025-01-17 PROCEDURE — 2500000003 HC RX 250 WO HCPCS

## 2025-01-17 PROCEDURE — 2580000003 HC RX 258: Performed by: INTERNAL MEDICINE

## 2025-01-17 PROCEDURE — 6370000000 HC RX 637 (ALT 250 FOR IP)

## 2025-01-17 PROCEDURE — 85025 COMPLETE CBC W/AUTO DIFF WBC: CPT

## 2025-01-17 PROCEDURE — 2700000000 HC OXYGEN THERAPY PER DAY

## 2025-01-17 PROCEDURE — 2060000000 HC ICU INTERMEDIATE R&B

## 2025-01-17 PROCEDURE — 94640 AIRWAY INHALATION TREATMENT: CPT

## 2025-01-17 PROCEDURE — 99232 SBSQ HOSP IP/OBS MODERATE 35: CPT | Performed by: STUDENT IN AN ORGANIZED HEALTH CARE EDUCATION/TRAINING PROGRAM

## 2025-01-17 RX ORDER — ATENOLOL 25 MG/1
12.5 TABLET ORAL DAILY
Status: DISCONTINUED | OUTPATIENT
Start: 2025-01-17 | End: 2025-01-18

## 2025-01-17 RX ORDER — ENOXAPARIN SODIUM 100 MG/ML
1 INJECTION SUBCUTANEOUS 2 TIMES DAILY
Status: DISCONTINUED | OUTPATIENT
Start: 2025-01-17 | End: 2025-01-27 | Stop reason: HOSPADM

## 2025-01-17 RX ADMIN — POTASSIUM CHLORIDE 20 MEQ: 20 TABLET, EXTENDED RELEASE ORAL at 08:20

## 2025-01-17 RX ADMIN — IPRATROPIUM BROMIDE AND ALBUTEROL SULFATE 1 DOSE: 2.5; .5 SOLUTION RESPIRATORY (INHALATION) at 08:43

## 2025-01-17 RX ADMIN — PREDNISONE 40 MG: 20 TABLET ORAL at 08:20

## 2025-01-17 RX ADMIN — SODIUM CHLORIDE, PRESERVATIVE FREE 10 ML: 5 INJECTION INTRAVENOUS at 17:39

## 2025-01-17 RX ADMIN — IPRATROPIUM BROMIDE AND ALBUTEROL SULFATE 1 DOSE: 2.5; .5 SOLUTION RESPIRATORY (INHALATION) at 16:20

## 2025-01-17 RX ADMIN — ENOXAPARIN SODIUM 80 MG: 100 INJECTION SUBCUTANEOUS at 08:21

## 2025-01-17 RX ADMIN — IPRATROPIUM BROMIDE AND ALBUTEROL SULFATE 1 DOSE: 2.5; .5 SOLUTION RESPIRATORY (INHALATION) at 13:21

## 2025-01-17 RX ADMIN — IPRATROPIUM BROMIDE AND ALBUTEROL SULFATE 1 DOSE: 2.5; .5 SOLUTION RESPIRATORY (INHALATION) at 20:30

## 2025-01-17 RX ADMIN — ENOXAPARIN SODIUM 80 MG: 100 INJECTION SUBCUTANEOUS at 20:04

## 2025-01-17 RX ADMIN — BUMETANIDE 1 MG: 0.25 INJECTION INTRAMUSCULAR; INTRAVENOUS at 17:39

## 2025-01-17 RX ADMIN — SODIUM CHLORIDE, PRESERVATIVE FREE 10 ML: 5 INJECTION INTRAVENOUS at 20:05

## 2025-01-17 RX ADMIN — CEFEPIME 2000 MG: 2 INJECTION, POWDER, FOR SOLUTION INTRAVENOUS at 00:37

## 2025-01-17 RX ADMIN — ARFORMOTEROL TARTRATE 15 MCG: 15 SOLUTION RESPIRATORY (INHALATION) at 20:30

## 2025-01-17 RX ADMIN — SODIUM CHLORIDE, PRESERVATIVE FREE 10 ML: 5 INJECTION INTRAVENOUS at 08:21

## 2025-01-17 RX ADMIN — BUDESONIDE INHALATION 500 MCG: 0.5 SUSPENSION RESPIRATORY (INHALATION) at 20:30

## 2025-01-17 RX ADMIN — ARFORMOTEROL TARTRATE 15 MCG: 15 SOLUTION RESPIRATORY (INHALATION) at 08:43

## 2025-01-17 RX ADMIN — BUMETANIDE 1 MG: 0.25 INJECTION INTRAMUSCULAR; INTRAVENOUS at 08:20

## 2025-01-17 RX ADMIN — CEFEPIME 2000 MG: 2 INJECTION, POWDER, FOR SOLUTION INTRAVENOUS at 11:53

## 2025-01-17 RX ADMIN — BUDESONIDE INHALATION 500 MCG: 0.5 SUSPENSION RESPIRATORY (INHALATION) at 08:43

## 2025-01-17 RX ADMIN — ATENOLOL 12.5 MG: 25 TABLET ORAL at 20:53

## 2025-01-17 ASSESSMENT — PAIN SCALES - GENERAL: PAINLEVEL_OUTOF10: 0

## 2025-01-17 NOTE — PLAN OF CARE
Problem: ABCDS Injury Assessment  Goal: Absence of physical injury  1/16/2025 2218 by Hortencia Cintron RN  Outcome: Progressing     Problem: Discharge Planning  Goal: Discharge to home or other facility with appropriate resources  1/16/2025 2218 by Hortencia Cintron RN  Outcome: Progressing     Problem: Chronic Conditions and Co-morbidities  Goal: Patient's chronic conditions and co-morbidity symptoms are monitored and maintained or improved  1/16/2025 2218 by Hortencia Cintron RN  Outcome: Progressing     Problem: Safety - Adult  Goal: Free from fall injury  1/16/2025 2218 by Hortencia Cintron RN  Outcome: Progressing

## 2025-01-17 NOTE — CARE COORDINATION
Cardiology consult pending, on IV bumex BID. Pt w/COPD exac, acute on chronic CHF. Pulm following. Pt remains on 11L O2. His home O2 is supplied through [employer], Rachel tells CM that pt.'s order is for 3L NC, but pt states he has been wearing 7-8L. Pt will need new orders for O2 at home as his equipment only goes up to 5L. Pt has a nebulizer and chest vest. He is established w/Dr. Paredes and uses [facility]. Pt denies any needs for CM and will return home. Will follow.   MARIBEL Colon, RN  [facility] Case Management  [phone]

## 2025-01-17 NOTE — PLAN OF CARE
Problem: ABCDS Injury Assessment  Goal: Absence of physical injury  1/17/2025 1028 by Obinna Enamorado RN  Outcome: Progressing  1/16/2025 2218 by Hortencia Cintron RN  Outcome: Progressing     Problem: Discharge Planning  Goal: Discharge to home or other facility with appropriate resources  1/17/2025 1028 by Obinna Enamorado RN  Outcome: Progressing  Flowsheets (Taken 1/17/2025 0815)  Discharge to home or other facility with appropriate resources: Identify barriers to discharge with patient and caregiver  1/16/2025 2218 by Hortencia Cintron RN  Outcome: Progressing     Problem: Chronic Conditions and Co-morbidities  Goal: Patient's chronic conditions and co-morbidity symptoms are monitored and maintained or improved  1/17/2025 1028 by Obinna Enamorado RN  Outcome: Progressing  Flowsheets (Taken 1/17/2025 0815)  Care Plan - Patient's Chronic Conditions and Co-Morbidity Symptoms are Monitored and Maintained or Improved: Monitor and assess patient's chronic conditions and comorbid symptoms for stability, deterioration, or improvement  1/16/2025 2218 by Hortencia Cintron RN  Outcome: Progressing     Problem: Safety - Adult  Goal: Free from fall injury  1/17/2025 1028 by Obinna Enamorado RN  Outcome: Progressing  1/16/2025 2218 by Hortencia Cintron RN  Outcome: Progressing     Problem: Pain  Goal: Verbalizes/displays adequate comfort level or baseline comfort level  Outcome: Progressing

## 2025-01-18 LAB
ANION GAP SERPL CALCULATED.3IONS-SCNC: 7 MMOL/L (ref 7–16)
BASOPHILS # BLD: 0 K/UL (ref 0–0.2)
BASOPHILS NFR BLD: 0 % (ref 0–2)
BNP SERPL-MCNC: 8471 PG/ML (ref 0–125)
BUN SERPL-MCNC: 24 MG/DL (ref 6–23)
CALCIUM SERPL-MCNC: 8.8 MG/DL (ref 8.6–10.2)
CHLORIDE SERPL-SCNC: 102 MMOL/L (ref 98–107)
CO2 SERPL-SCNC: 31 MMOL/L (ref 22–29)
CREAT SERPL-MCNC: 0.9 MG/DL (ref 0.7–1.2)
EOSINOPHIL # BLD: 0.05 K/UL (ref 0.05–0.5)
EOSINOPHILS RELATIVE PERCENT: 1 % (ref 0–6)
ERYTHROCYTE [DISTWIDTH] IN BLOOD BY AUTOMATED COUNT: 14.3 % (ref 11.5–15)
GFR, ESTIMATED: >90 ML/MIN/1.73M2
GLUCOSE SERPL-MCNC: 83 MG/DL (ref 74–99)
HCT VFR BLD AUTO: 48.2 % (ref 37–54)
HGB BLD-MCNC: 15.3 G/DL (ref 12.5–16.5)
IMM GRANULOCYTES # BLD AUTO: 0.05 K/UL (ref 0–0.58)
IMM GRANULOCYTES NFR BLD: 1 % (ref 0–5)
LYMPHOCYTES NFR BLD: 1.49 K/UL (ref 1.5–4)
LYMPHOCYTES RELATIVE PERCENT: 18 % (ref 20–42)
MCH RBC QN AUTO: 31.4 PG (ref 26–35)
MCHC RBC AUTO-ENTMCNC: 31.7 G/DL (ref 32–34.5)
MCV RBC AUTO: 98.8 FL (ref 80–99.9)
MONOCYTES NFR BLD: 0.74 K/UL (ref 0.1–0.95)
MONOCYTES NFR BLD: 9 % (ref 2–12)
NEUTROPHILS NFR BLD: 71 % (ref 43–80)
NEUTS SEG NFR BLD: 5.75 K/UL (ref 1.8–7.3)
PLATELET # BLD AUTO: 214 K/UL (ref 130–450)
PMV BLD AUTO: 10.9 FL (ref 7–12)
POTASSIUM SERPL-SCNC: 4.1 MMOL/L (ref 3.5–5)
RBC # BLD AUTO: 4.88 M/UL (ref 3.8–5.8)
SODIUM SERPL-SCNC: 140 MMOL/L (ref 132–146)
WBC OTHER # BLD: 8.1 K/UL (ref 4.5–11.5)

## 2025-01-18 PROCEDURE — 2060000000 HC ICU INTERMEDIATE R&B

## 2025-01-18 PROCEDURE — 85025 COMPLETE CBC W/AUTO DIFF WBC: CPT

## 2025-01-18 PROCEDURE — 83880 ASSAY OF NATRIURETIC PEPTIDE: CPT

## 2025-01-18 PROCEDURE — 6370000000 HC RX 637 (ALT 250 FOR IP)

## 2025-01-18 PROCEDURE — 2580000003 HC RX 258: Performed by: INTERNAL MEDICINE

## 2025-01-18 PROCEDURE — 6360000002 HC RX W HCPCS: Performed by: INTERNAL MEDICINE

## 2025-01-18 PROCEDURE — 6360000002 HC RX W HCPCS

## 2025-01-18 PROCEDURE — 99232 SBSQ HOSP IP/OBS MODERATE 35: CPT | Performed by: STUDENT IN AN ORGANIZED HEALTH CARE EDUCATION/TRAINING PROGRAM

## 2025-01-18 PROCEDURE — 94640 AIRWAY INHALATION TREATMENT: CPT

## 2025-01-18 PROCEDURE — 6370000000 HC RX 637 (ALT 250 FOR IP): Performed by: INTERNAL MEDICINE

## 2025-01-18 PROCEDURE — 2700000000 HC OXYGEN THERAPY PER DAY

## 2025-01-18 PROCEDURE — 2500000003 HC RX 250 WO HCPCS

## 2025-01-18 PROCEDURE — 80048 BASIC METABOLIC PNL TOTAL CA: CPT

## 2025-01-18 RX ORDER — ATENOLOL 50 MG/1
25 TABLET ORAL DAILY
Status: DISCONTINUED | OUTPATIENT
Start: 2025-01-19 | End: 2025-01-27 | Stop reason: HOSPADM

## 2025-01-18 RX ADMIN — SODIUM CHLORIDE, PRESERVATIVE FREE 10 ML: 5 INJECTION INTRAVENOUS at 17:36

## 2025-01-18 RX ADMIN — BUDESONIDE INHALATION 500 MCG: 0.5 SUSPENSION RESPIRATORY (INHALATION) at 08:08

## 2025-01-18 RX ADMIN — ARFORMOTEROL TARTRATE 15 MCG: 15 SOLUTION RESPIRATORY (INHALATION) at 20:37

## 2025-01-18 RX ADMIN — ARFORMOTEROL TARTRATE 15 MCG: 15 SOLUTION RESPIRATORY (INHALATION) at 08:09

## 2025-01-18 RX ADMIN — POTASSIUM CHLORIDE 20 MEQ: 20 TABLET, EXTENDED RELEASE ORAL at 08:24

## 2025-01-18 RX ADMIN — SODIUM CHLORIDE, PRESERVATIVE FREE 10 ML: 5 INJECTION INTRAVENOUS at 20:05

## 2025-01-18 RX ADMIN — ENOXAPARIN SODIUM 80 MG: 100 INJECTION SUBCUTANEOUS at 20:03

## 2025-01-18 RX ADMIN — CEFEPIME 2000 MG: 2 INJECTION, POWDER, FOR SOLUTION INTRAVENOUS at 12:21

## 2025-01-18 RX ADMIN — ENOXAPARIN SODIUM 80 MG: 100 INJECTION SUBCUTANEOUS at 08:24

## 2025-01-18 RX ADMIN — IPRATROPIUM BROMIDE AND ALBUTEROL SULFATE 1 DOSE: 2.5; .5 SOLUTION RESPIRATORY (INHALATION) at 08:08

## 2025-01-18 RX ADMIN — PREDNISONE 40 MG: 20 TABLET ORAL at 08:24

## 2025-01-18 RX ADMIN — CEFEPIME 2000 MG: 2 INJECTION, POWDER, FOR SOLUTION INTRAVENOUS at 23:40

## 2025-01-18 RX ADMIN — CEFEPIME 2000 MG: 2 INJECTION, POWDER, FOR SOLUTION INTRAVENOUS at 00:50

## 2025-01-18 RX ADMIN — BUMETANIDE 1 MG: 0.25 INJECTION INTRAMUSCULAR; INTRAVENOUS at 17:36

## 2025-01-18 RX ADMIN — BUDESONIDE INHALATION 500 MCG: 0.5 SUSPENSION RESPIRATORY (INHALATION) at 20:37

## 2025-01-18 RX ADMIN — SODIUM CHLORIDE, PRESERVATIVE FREE 10 ML: 5 INJECTION INTRAVENOUS at 08:24

## 2025-01-18 RX ADMIN — OXYCODONE HYDROCHLORIDE AND ACETAMINOPHEN 1 TABLET: 5; 325 TABLET ORAL at 15:33

## 2025-01-18 RX ADMIN — IPRATROPIUM BROMIDE AND ALBUTEROL SULFATE 1 DOSE: 2.5; .5 SOLUTION RESPIRATORY (INHALATION) at 20:37

## 2025-01-18 RX ADMIN — IPRATROPIUM BROMIDE AND ALBUTEROL SULFATE 1 DOSE: 2.5; .5 SOLUTION RESPIRATORY (INHALATION) at 12:17

## 2025-01-18 RX ADMIN — ATENOLOL 12.5 MG: 25 TABLET ORAL at 08:23

## 2025-01-18 RX ADMIN — IPRATROPIUM BROMIDE AND ALBUTEROL SULFATE 1 DOSE: 2.5; .5 SOLUTION RESPIRATORY (INHALATION) at 16:12

## 2025-01-18 RX ADMIN — BUMETANIDE 1 MG: 0.25 INJECTION INTRAMUSCULAR; INTRAVENOUS at 08:24

## 2025-01-18 ASSESSMENT — PAIN SCALES - GENERAL
PAINLEVEL_OUTOF10: 1
PAINLEVEL_OUTOF10: 8
PAINLEVEL_OUTOF10: 0
PAINLEVEL_OUTOF10: 8
PAINLEVEL_OUTOF10: 3

## 2025-01-18 ASSESSMENT — PAIN SCALES - WONG BAKER: WONGBAKER_NUMERICALRESPONSE: NO HURT

## 2025-01-18 ASSESSMENT — PAIN DESCRIPTION - DESCRIPTORS: DESCRIPTORS: NUMBNESS;SHARP

## 2025-01-18 ASSESSMENT — PAIN - FUNCTIONAL ASSESSMENT: PAIN_FUNCTIONAL_ASSESSMENT: PREVENTS OR INTERFERES SOME ACTIVE ACTIVITIES AND ADLS

## 2025-01-18 ASSESSMENT — PAIN DESCRIPTION - LOCATION: LOCATION: HIP;BUTTOCKS

## 2025-01-18 ASSESSMENT — PAIN DESCRIPTION - ORIENTATION: ORIENTATION: LEFT

## 2025-01-18 NOTE — PLAN OF CARE
Problem: ABCDS Injury Assessment  Goal: Absence of physical injury  Outcome: Progressing     Problem: Discharge Planning  Goal: Discharge to home or other facility with appropriate resources  Outcome: Progressing  Flowsheets (Taken 1/18/2025 0820)  Discharge to home or other facility with appropriate resources: Identify barriers to discharge with patient and caregiver     Problem: Chronic Conditions and Co-morbidities  Goal: Patient's chronic conditions and co-morbidity symptoms are monitored and maintained or improved  Outcome: Progressing  Flowsheets (Taken 1/18/2025 0820)  Care Plan - Patient's Chronic Conditions and Co-Morbidity Symptoms are Monitored and Maintained or Improved: Monitor and assess patient's chronic conditions and comorbid symptoms for stability, deterioration, or improvement     Problem: Safety - Adult  Goal: Free from fall injury  Outcome: Progressing     Problem: Pain  Goal: Verbalizes/displays adequate comfort level or baseline comfort level  Outcome: Progressing

## 2025-01-19 ENCOUNTER — APPOINTMENT (OUTPATIENT)
Age: 75
DRG: 189 | End: 2025-01-19
Attending: INTERNAL MEDICINE
Payer: MEDICARE

## 2025-01-19 LAB
BNP SERPL-MCNC: 7442 PG/ML (ref 0–125)
ECHO BSA: 2.08 M2
ECHO EST RA PRESSURE: 8 MMHG
ECHO LA DIAMETER INDEX: 1.66 CM/M2
ECHO LA DIAMETER: 3.4 CM
ECHO LA VOL A-L A2C: 56 ML (ref 18–58)
ECHO LA VOL A-L A4C: 43 ML (ref 18–58)
ECHO LA VOL MOD A2C: 53 ML (ref 18–58)
ECHO LA VOL MOD A4C: 39 ML (ref 18–58)
ECHO LA VOLUME AREA LENGTH: 51 ML
ECHO LA VOLUME INDEX A-L A2C: 27 ML/M2 (ref 16–34)
ECHO LA VOLUME INDEX A-L A4C: 21 ML/M2 (ref 16–34)
ECHO LA VOLUME INDEX AREA LENGTH: 25 ML/M2 (ref 16–34)
ECHO LA VOLUME INDEX MOD A2C: 26 ML/M2 (ref 16–34)
ECHO LA VOLUME INDEX MOD A4C: 19 ML/M2 (ref 16–34)
ECHO LV E' LATERAL VELOCITY: 8 CM/S
ECHO LV E' SEPTAL VELOCITY: 3 CM/S
ECHO LV EDV A2C: 109 ML
ECHO LV EDV A4C: 89 ML
ECHO LV EDV BP: 104 ML (ref 67–155)
ECHO LV EDV INDEX A4C: 43 ML/M2
ECHO LV EDV INDEX BP: 51 ML/M2
ECHO LV EDV NDEX A2C: 53 ML/M2
ECHO LV EF PHYSICIAN: 53 %
ECHO LV EJECTION FRACTION A2C: 47 %
ECHO LV EJECTION FRACTION A4C: 45 %
ECHO LV EJECTION FRACTION BIPLANE: 47 % (ref 55–100)
ECHO LV ESV A2C: 57 ML
ECHO LV ESV A4C: 49 ML
ECHO LV ESV BP: 55 ML (ref 22–58)
ECHO LV ESV INDEX A2C: 28 ML/M2
ECHO LV ESV INDEX A4C: 24 ML/M2
ECHO LV ESV INDEX BP: 27 ML/M2
ECHO LV FRACTIONAL SHORTENING: 26 % (ref 28–44)
ECHO LV INTERNAL DIMENSION DIASTOLE INDEX: 2.1 CM/M2
ECHO LV INTERNAL DIMENSION DIASTOLIC: 4.3 CM (ref 4.2–5.9)
ECHO LV INTERNAL DIMENSION SYSTOLIC INDEX: 1.56 CM/M2
ECHO LV INTERNAL DIMENSION SYSTOLIC: 3.2 CM
ECHO LV IVSD: 0.8 CM (ref 0.6–1)
ECHO LV IVSS: 1.2 CM
ECHO LV MASS 2D: 114.2 G (ref 88–224)
ECHO LV MASS INDEX 2D: 55.7 G/M2 (ref 49–115)
ECHO LV POSTERIOR WALL DIASTOLIC: 0.9 CM (ref 0.6–1)
ECHO LV POSTERIOR WALL SYSTOLIC: 1.4 CM
ECHO LV RELATIVE WALL THICKNESS RATIO: 0.42
ECHO MV "A" WAVE DURATION: 129.2 MSEC
ECHO MV A VELOCITY: 0.6 M/S
ECHO MV E DECELERATION TIME (DT): 312 MS
ECHO MV E VELOCITY: 0.34 M/S
ECHO MV E/A RATIO: 0.57
ECHO MV E/E' LATERAL: 4.25
ECHO MV E/E' RATIO (AVERAGED): 7.79
ECHO MV E/E' SEPTAL: 11.33
ECHO RIGHT VENTRICULAR SYSTOLIC PRESSURE (RVSP): 58 MMHG
ECHO RV INTERNAL DIMENSION: 5.3 CM
ECHO RV TAPSE: 1.6 CM (ref 1.7–?)
ECHO TV REGURGITANT MAX VELOCITY: 3.55 M/S
ECHO TV REGURGITANT PEAK GRADIENT: 50 MMHG

## 2025-01-19 PROCEDURE — 83880 ASSAY OF NATRIURETIC PEPTIDE: CPT

## 2025-01-19 PROCEDURE — 6360000002 HC RX W HCPCS: Performed by: INTERNAL MEDICINE

## 2025-01-19 PROCEDURE — 6370000000 HC RX 637 (ALT 250 FOR IP)

## 2025-01-19 PROCEDURE — 99232 SBSQ HOSP IP/OBS MODERATE 35: CPT | Performed by: STUDENT IN AN ORGANIZED HEALTH CARE EDUCATION/TRAINING PROGRAM

## 2025-01-19 PROCEDURE — 94640 AIRWAY INHALATION TREATMENT: CPT

## 2025-01-19 PROCEDURE — 93308 TTE F-UP OR LMTD: CPT

## 2025-01-19 PROCEDURE — 2580000003 HC RX 258: Performed by: INTERNAL MEDICINE

## 2025-01-19 PROCEDURE — 2700000000 HC OXYGEN THERAPY PER DAY

## 2025-01-19 PROCEDURE — 6370000000 HC RX 637 (ALT 250 FOR IP): Performed by: INTERNAL MEDICINE

## 2025-01-19 PROCEDURE — 6360000002 HC RX W HCPCS

## 2025-01-19 PROCEDURE — 2060000000 HC ICU INTERMEDIATE R&B

## 2025-01-19 PROCEDURE — 2500000003 HC RX 250 WO HCPCS

## 2025-01-19 RX ORDER — SPIRONOLACTONE 25 MG/1
12.5 TABLET ORAL DAILY
Status: DISCONTINUED | OUTPATIENT
Start: 2025-01-19 | End: 2025-01-27 | Stop reason: HOSPADM

## 2025-01-19 RX ADMIN — ARFORMOTEROL TARTRATE 15 MCG: 15 SOLUTION RESPIRATORY (INHALATION) at 19:52

## 2025-01-19 RX ADMIN — SODIUM CHLORIDE, PRESERVATIVE FREE 10 ML: 5 INJECTION INTRAVENOUS at 08:50

## 2025-01-19 RX ADMIN — SODIUM CHLORIDE, PRESERVATIVE FREE 10 ML: 5 INJECTION INTRAVENOUS at 19:36

## 2025-01-19 RX ADMIN — ARFORMOTEROL TARTRATE 15 MCG: 15 SOLUTION RESPIRATORY (INHALATION) at 07:52

## 2025-01-19 RX ADMIN — OXYCODONE 2.5 MG: 5 TABLET ORAL at 09:09

## 2025-01-19 RX ADMIN — BUDESONIDE INHALATION 500 MCG: 0.5 SUSPENSION RESPIRATORY (INHALATION) at 07:52

## 2025-01-19 RX ADMIN — OXYCODONE HYDROCHLORIDE AND ACETAMINOPHEN 1 TABLET: 5; 325 TABLET ORAL at 09:11

## 2025-01-19 RX ADMIN — BUDESONIDE INHALATION 500 MCG: 0.5 SUSPENSION RESPIRATORY (INHALATION) at 19:52

## 2025-01-19 RX ADMIN — ENOXAPARIN SODIUM 80 MG: 100 INJECTION SUBCUTANEOUS at 08:49

## 2025-01-19 RX ADMIN — ENOXAPARIN SODIUM 80 MG: 100 INJECTION SUBCUTANEOUS at 19:36

## 2025-01-19 RX ADMIN — IPRATROPIUM BROMIDE AND ALBUTEROL SULFATE 1 DOSE: 2.5; .5 SOLUTION RESPIRATORY (INHALATION) at 16:26

## 2025-01-19 RX ADMIN — ATENOLOL 25 MG: 50 TABLET ORAL at 08:49

## 2025-01-19 RX ADMIN — IPRATROPIUM BROMIDE AND ALBUTEROL SULFATE 1 DOSE: 2.5; .5 SOLUTION RESPIRATORY (INHALATION) at 07:52

## 2025-01-19 RX ADMIN — BUMETANIDE 1 MG: 0.25 INJECTION INTRAMUSCULAR; INTRAVENOUS at 09:12

## 2025-01-19 RX ADMIN — CEFEPIME 2000 MG: 2 INJECTION, POWDER, FOR SOLUTION INTRAVENOUS at 12:10

## 2025-01-19 RX ADMIN — IPRATROPIUM BROMIDE AND ALBUTEROL SULFATE 1 DOSE: 2.5; .5 SOLUTION RESPIRATORY (INHALATION) at 19:52

## 2025-01-19 RX ADMIN — POTASSIUM CHLORIDE 20 MEQ: 20 TABLET, EXTENDED RELEASE ORAL at 08:49

## 2025-01-19 RX ADMIN — IPRATROPIUM BROMIDE AND ALBUTEROL SULFATE 1 DOSE: 2.5; .5 SOLUTION RESPIRATORY (INHALATION) at 12:23

## 2025-01-19 ASSESSMENT — PAIN DESCRIPTION - ORIENTATION: ORIENTATION: LEFT

## 2025-01-19 ASSESSMENT — PAIN SCALES - WONG BAKER
WONGBAKER_NUMERICALRESPONSE: NO HURT

## 2025-01-19 ASSESSMENT — PAIN SCALES - GENERAL
PAINLEVEL_OUTOF10: 7
PAINLEVEL_OUTOF10: 0

## 2025-01-19 ASSESSMENT — PAIN DESCRIPTION - LOCATION: LOCATION: HIP

## 2025-01-19 NOTE — PLAN OF CARE
Problem: ABCDS Injury Assessment  Goal: Absence of physical injury  1/19/2025 0940 by Karime Lopez RN  Outcome: Progressing  Flowsheets (Taken 1/19/2025 0845)  Absence of Physical Injury: Implement safety measures based on patient assessment  1/18/2025 2235 by Corona Tan RN  Outcome: Progressing     Problem: Discharge Planning  Goal: Discharge to home or other facility with appropriate resources  1/19/2025 0940 by Karime Lopez RN  Outcome: Progressing  Flowsheets (Taken 1/19/2025 0845)  Discharge to home or other facility with appropriate resources: Identify barriers to discharge with patient and caregiver  1/18/2025 2235 by Corona Tan RN  Outcome: Progressing     Problem: Chronic Conditions and Co-morbidities  Goal: Patient's chronic conditions and co-morbidity symptoms are monitored and maintained or improved  1/19/2025 0940 by Karime Lopez RN  Outcome: Progressing  Flowsheets (Taken 1/19/2025 0845)  Care Plan - Patient's Chronic Conditions and Co-Morbidity Symptoms are Monitored and Maintained or Improved: Monitor and assess patient's chronic conditions and comorbid symptoms for stability, deterioration, or improvement  1/18/2025 2235 by Corona Tan RN  Outcome: Progressing     Problem: Safety - Adult  Goal: Free from fall injury  1/19/2025 0940 by Karime Lopez RN  Outcome: Progressing  Flowsheets (Taken 1/19/2025 0845)  Free From Fall Injury: Instruct family/caregiver on patient safety  1/18/2025 2235 by Corona Tan RN  Outcome: Progressing     Problem: Pain  Goal: Verbalizes/displays adequate comfort level or baseline comfort level  1/19/2025 0940 by Karime Lpoez RN  Outcome: Progressing  1/18/2025 2235 by Corona Tan RN  Outcome: Progressing

## 2025-01-20 ENCOUNTER — APPOINTMENT (OUTPATIENT)
Dept: GENERAL RADIOLOGY | Age: 75
DRG: 189 | End: 2025-01-20
Payer: MEDICARE

## 2025-01-20 PROBLEM — Z71.89 GOALS OF CARE, COUNSELING/DISCUSSION: Status: ACTIVE | Noted: 2025-01-20

## 2025-01-20 PROBLEM — Z51.5 PALLIATIVE CARE ENCOUNTER: Status: ACTIVE | Noted: 2025-01-20

## 2025-01-20 LAB
B.E.: 2.1 MMOL/L (ref -3–3)
B.E.: 3 MMOL/L (ref -3–3)
COHB: 1.1 % (ref 0–1.5)
COHB: 1.6 % (ref 0–1.5)
COMMENT: ABNORMAL
CRITICAL: ABNORMAL
CRITICAL: ABNORMAL
DATE ANALYZED: ABNORMAL
DATE ANALYZED: ABNORMAL
DATE OF COLLECTION: ABNORMAL
DATE OF COLLECTION: ABNORMAL
HCO3: 24.6 MMOL/L (ref 22–26)
HCO3: 26.6 MMOL/L (ref 22–26)
HHB: 4.7 % (ref 0–5)
HHB: 45.3 % (ref 0–5)
LAB: ABNORMAL
LAB: ABNORMAL
Lab: 1628
Lab: 2111
METHB: 0.3 % (ref 0–1.5)
METHB: 0.3 % (ref 0–1.5)
MODE: ABNORMAL
MODE: ABNORMAL
O2 CONTENT: 10.5 ML/DL
O2 CONTENT: 19.8 ML/DL
O2 SATURATION: 53.8 % (ref 92–98.5)
O2 SATURATION: 95.2 % (ref 92–98.5)
O2HB: 52.8 % (ref 94–97)
O2HB: 93.9 % (ref 94–97)
OPERATOR ID: 5132
OPERATOR ID: 8634
PATIENT TEMP: 37 C
PATIENT TEMP: 37 C
PCO2: 32 MMHG (ref 35–45)
PCO2: 37.5 MMHG (ref 35–45)
PH BLOOD GAS: 7.47 (ref 7.35–7.45)
PH BLOOD GAS: 7.5 (ref 7.35–7.45)
PO2: 27.8 MMHG (ref 75–100)
PO2: 73.3 MMHG (ref 75–100)
SOURCE, BLOOD GAS: ABNORMAL
SOURCE, BLOOD GAS: ABNORMAL
THB: 14.2 G/DL (ref 11.5–16.5)
THB: 15 G/DL (ref 11.5–16.5)
TIME ANALYZED: 1630
TIME ANALYZED: 2116

## 2025-01-20 PROCEDURE — 2700000000 HC OXYGEN THERAPY PER DAY

## 2025-01-20 PROCEDURE — 2060000000 HC ICU INTERMEDIATE R&B

## 2025-01-20 PROCEDURE — 6370000000 HC RX 637 (ALT 250 FOR IP)

## 2025-01-20 PROCEDURE — 2500000003 HC RX 250 WO HCPCS

## 2025-01-20 PROCEDURE — 6360000002 HC RX W HCPCS

## 2025-01-20 PROCEDURE — 82805 BLOOD GASES W/O2 SATURATION: CPT

## 2025-01-20 PROCEDURE — 6360000002 HC RX W HCPCS: Performed by: INTERNAL MEDICINE

## 2025-01-20 PROCEDURE — 5A0955A ASSISTANCE WITH RESPIRATORY VENTILATION, GREATER THAN 96 CONSECUTIVE HOURS, HIGH NASAL FLOW/VELOCITY: ICD-10-PCS | Performed by: INTERNAL MEDICINE

## 2025-01-20 PROCEDURE — 71045 X-RAY EXAM CHEST 1 VIEW: CPT

## 2025-01-20 PROCEDURE — 99231 SBSQ HOSP IP/OBS SF/LOW 25: CPT | Performed by: NURSE PRACTITIONER

## 2025-01-20 PROCEDURE — 99232 SBSQ HOSP IP/OBS MODERATE 35: CPT | Performed by: STUDENT IN AN ORGANIZED HEALTH CARE EDUCATION/TRAINING PROGRAM

## 2025-01-20 PROCEDURE — 36600 WITHDRAWAL OF ARTERIAL BLOOD: CPT

## 2025-01-20 PROCEDURE — 6370000000 HC RX 637 (ALT 250 FOR IP): Performed by: INTERNAL MEDICINE

## 2025-01-20 PROCEDURE — 94640 AIRWAY INHALATION TREATMENT: CPT

## 2025-01-20 RX ORDER — DIGOXIN 125 MCG
62.5 TABLET ORAL DAILY
Status: DISCONTINUED | OUTPATIENT
Start: 2025-01-20 | End: 2025-01-27 | Stop reason: HOSPADM

## 2025-01-20 RX ORDER — LORAZEPAM 0.5 MG/1
0.5 TABLET ORAL EVERY 6 HOURS PRN
Status: DISCONTINUED | OUTPATIENT
Start: 2025-01-20 | End: 2025-01-20

## 2025-01-20 RX ORDER — MIDODRINE HYDROCHLORIDE 5 MG/1
5 TABLET ORAL ONCE
Status: COMPLETED | OUTPATIENT
Start: 2025-01-20 | End: 2025-01-20

## 2025-01-20 RX ADMIN — IPRATROPIUM BROMIDE AND ALBUTEROL SULFATE 1 DOSE: 2.5; .5 SOLUTION RESPIRATORY (INHALATION) at 16:13

## 2025-01-20 RX ADMIN — IPRATROPIUM BROMIDE AND ALBUTEROL SULFATE 1 DOSE: 2.5; .5 SOLUTION RESPIRATORY (INHALATION) at 19:55

## 2025-01-20 RX ADMIN — OXYCODONE HYDROCHLORIDE AND ACETAMINOPHEN 1 TABLET: 5; 325 TABLET ORAL at 17:03

## 2025-01-20 RX ADMIN — SPIRONOLACTONE 12.5 MG: 25 TABLET ORAL at 08:57

## 2025-01-20 RX ADMIN — ACETAMINOPHEN 650 MG: 325 TABLET ORAL at 19:58

## 2025-01-20 RX ADMIN — IPRATROPIUM BROMIDE AND ALBUTEROL SULFATE 1 DOSE: 2.5; .5 SOLUTION RESPIRATORY (INHALATION) at 13:03

## 2025-01-20 RX ADMIN — ENOXAPARIN SODIUM 80 MG: 100 INJECTION SUBCUTANEOUS at 19:54

## 2025-01-20 RX ADMIN — ENOXAPARIN SODIUM 80 MG: 100 INJECTION SUBCUTANEOUS at 08:58

## 2025-01-20 RX ADMIN — ACETAMINOPHEN 650 MG: 325 TABLET ORAL at 09:21

## 2025-01-20 RX ADMIN — OXYCODONE 2.5 MG: 5 TABLET ORAL at 04:49

## 2025-01-20 RX ADMIN — OXYCODONE HYDROCHLORIDE AND ACETAMINOPHEN 1 TABLET: 5; 325 TABLET ORAL at 04:49

## 2025-01-20 RX ADMIN — BUMETANIDE 1 MG: 0.25 INJECTION INTRAMUSCULAR; INTRAVENOUS at 08:58

## 2025-01-20 RX ADMIN — ARFORMOTEROL TARTRATE 15 MCG: 15 SOLUTION RESPIRATORY (INHALATION) at 19:55

## 2025-01-20 RX ADMIN — BUDESONIDE INHALATION 500 MCG: 0.5 SUSPENSION RESPIRATORY (INHALATION) at 19:55

## 2025-01-20 RX ADMIN — ARFORMOTEROL TARTRATE 15 MCG: 15 SOLUTION RESPIRATORY (INHALATION) at 07:49

## 2025-01-20 RX ADMIN — DIGOXIN 62.5 MCG: 0.12 TABLET ORAL at 13:09

## 2025-01-20 RX ADMIN — IPRATROPIUM BROMIDE AND ALBUTEROL SULFATE 1 DOSE: 2.5; .5 SOLUTION RESPIRATORY (INHALATION) at 07:49

## 2025-01-20 RX ADMIN — POTASSIUM CHLORIDE 20 MEQ: 20 TABLET, EXTENDED RELEASE ORAL at 08:57

## 2025-01-20 RX ADMIN — MIDODRINE HYDROCHLORIDE 5 MG: 5 TABLET ORAL at 22:58

## 2025-01-20 RX ADMIN — ATENOLOL 25 MG: 50 TABLET ORAL at 08:58

## 2025-01-20 RX ADMIN — SODIUM CHLORIDE, PRESERVATIVE FREE 10 ML: 5 INJECTION INTRAVENOUS at 09:01

## 2025-01-20 RX ADMIN — OXYCODONE 2.5 MG: 5 TABLET ORAL at 17:03

## 2025-01-20 RX ADMIN — BUDESONIDE INHALATION 500 MCG: 0.5 SUSPENSION RESPIRATORY (INHALATION) at 07:49

## 2025-01-20 RX ADMIN — Medication 3 MG: at 19:54

## 2025-01-20 RX ADMIN — SODIUM CHLORIDE, PRESERVATIVE FREE 10 ML: 5 INJECTION INTRAVENOUS at 19:54

## 2025-01-20 ASSESSMENT — PAIN DESCRIPTION - ORIENTATION
ORIENTATION: LEFT

## 2025-01-20 ASSESSMENT — PAIN DESCRIPTION - DESCRIPTORS
DESCRIPTORS: ACHING
DESCRIPTORS: SHARP

## 2025-01-20 ASSESSMENT — PAIN DESCRIPTION - PAIN TYPE: TYPE: ACUTE PAIN

## 2025-01-20 ASSESSMENT — PAIN - FUNCTIONAL ASSESSMENT: PAIN_FUNCTIONAL_ASSESSMENT: PREVENTS OR INTERFERES SOME ACTIVE ACTIVITIES AND ADLS

## 2025-01-20 ASSESSMENT — PAIN SCALES - GENERAL
PAINLEVEL_OUTOF10: 5
PAINLEVEL_OUTOF10: 10
PAINLEVEL_OUTOF10: 3
PAINLEVEL_OUTOF10: 10

## 2025-01-20 ASSESSMENT — PAIN DESCRIPTION - LOCATION
LOCATION: GROIN

## 2025-01-20 NOTE — PLAN OF CARE
Problem: ABCDS Injury Assessment  Goal: Absence of physical injury  1/20/2025 1112 by Karime Lopez, RN  Outcome: Progressing  Flowsheets (Taken 1/20/2025 0845)  Absence of Physical Injury: Implement safety measures based on patient assessment  1/19/2025 2150 by Corona Tan, RN  Outcome: Progressing     Problem: Discharge Planning  Goal: Discharge to home or other facility with appropriate resources  Outcome: Progressing  Flowsheets (Taken 1/20/2025 0845)  Discharge to home or other facility with appropriate resources: Identify barriers to discharge with patient and caregiver     Problem: Chronic Conditions and Co-morbidities  Goal: Patient's chronic conditions and co-morbidity symptoms are monitored and maintained or improved  Outcome: Progressing  Flowsheets (Taken 1/20/2025 0845)  Care Plan - Patient's Chronic Conditions and Co-Morbidity Symptoms are Monitored and Maintained or Improved: Monitor and assess patient's chronic conditions and comorbid symptoms for stability, deterioration, or improvement     Problem: Safety - Adult  Goal: Free from fall injury  Outcome: Progressing  Flowsheets (Taken 1/20/2025 0845)  Free From Fall Injury: Based on caregiver fall risk screen, instruct family/caregiver to ask for assistance with transferring infant if caregiver noted to have fall risk factors     Problem: Pain  Goal: Verbalizes/displays adequate comfort level or baseline comfort level  Outcome: Progressing  Flowsheets (Taken 1/20/2025 0845)  Verbalizes/displays adequate comfort level or baseline comfort level: Encourage patient to monitor pain and request assistance

## 2025-01-20 NOTE — CARE COORDINATION
Pt is on 15L HF. Bumex continued w/dig and aldactone started. Cardiology and pulm following. His home O2 is supplied through [employer], Rachel tells CM that pt.'s order is for 3L NC, but pt states he has been wearing 7-8L. Pt will need new orders for O2 at home as his equipment only goes up to 5L. Pt has a nebulizer and chest vest. He is established w/Dr. Paredes and uses [facility]. Pt denies any needs for CM and will return home. Will follow.   Lorraine Hernandez, ERICKN, RN  [facility] Case Management  [phone]

## 2025-01-21 LAB
ANION GAP SERPL CALCULATED.3IONS-SCNC: 8 MMOL/L (ref 7–16)
BASOPHILS # BLD: 0.01 K/UL (ref 0–0.2)
BASOPHILS NFR BLD: 0 % (ref 0–2)
BUN SERPL-MCNC: 23 MG/DL (ref 6–23)
CALCIUM SERPL-MCNC: 8.4 MG/DL (ref 8.6–10.2)
CHLORIDE SERPL-SCNC: 98 MMOL/L (ref 98–107)
CO2 SERPL-SCNC: 28 MMOL/L (ref 22–29)
CREAT SERPL-MCNC: 0.8 MG/DL (ref 0.7–1.2)
EOSINOPHIL # BLD: 0.06 K/UL (ref 0.05–0.5)
EOSINOPHILS RELATIVE PERCENT: 1 % (ref 0–6)
ERYTHROCYTE [DISTWIDTH] IN BLOOD BY AUTOMATED COUNT: 14.1 % (ref 11.5–15)
GFR, ESTIMATED: >90 ML/MIN/1.73M2
GLUCOSE SERPL-MCNC: 102 MG/DL (ref 74–99)
HCT VFR BLD AUTO: 35.8 % (ref 37–54)
HGB BLD-MCNC: 11.6 G/DL (ref 12.5–16.5)
IMM GRANULOCYTES # BLD AUTO: 0.13 K/UL (ref 0–0.58)
IMM GRANULOCYTES NFR BLD: 1 % (ref 0–5)
LYMPHOCYTES NFR BLD: 1.3 K/UL (ref 1.5–4)
LYMPHOCYTES RELATIVE PERCENT: 11 % (ref 20–42)
MCH RBC QN AUTO: 30.8 PG (ref 26–35)
MCHC RBC AUTO-ENTMCNC: 32.4 G/DL (ref 32–34.5)
MCV RBC AUTO: 95 FL (ref 80–99.9)
MONOCYTES NFR BLD: 0.9 K/UL (ref 0.1–0.95)
MONOCYTES NFR BLD: 7 % (ref 2–12)
NEUTROPHILS NFR BLD: 80 % (ref 43–80)
NEUTS SEG NFR BLD: 9.86 K/UL (ref 1.8–7.3)
PLATELET # BLD AUTO: 231 K/UL (ref 130–450)
PMV BLD AUTO: 10.7 FL (ref 7–12)
POTASSIUM SERPL-SCNC: 4.4 MMOL/L (ref 3.5–5)
RBC # BLD AUTO: 3.77 M/UL (ref 3.8–5.8)
SODIUM SERPL-SCNC: 134 MMOL/L (ref 132–146)
WBC OTHER # BLD: 12.3 K/UL (ref 4.5–11.5)

## 2025-01-21 PROCEDURE — 85025 COMPLETE CBC W/AUTO DIFF WBC: CPT

## 2025-01-21 PROCEDURE — 6370000000 HC RX 637 (ALT 250 FOR IP)

## 2025-01-21 PROCEDURE — 99232 SBSQ HOSP IP/OBS MODERATE 35: CPT | Performed by: STUDENT IN AN ORGANIZED HEALTH CARE EDUCATION/TRAINING PROGRAM

## 2025-01-21 PROCEDURE — 6360000002 HC RX W HCPCS

## 2025-01-21 PROCEDURE — 2500000003 HC RX 250 WO HCPCS

## 2025-01-21 PROCEDURE — 80048 BASIC METABOLIC PNL TOTAL CA: CPT

## 2025-01-21 PROCEDURE — 2700000000 HC OXYGEN THERAPY PER DAY

## 2025-01-21 PROCEDURE — 6370000000 HC RX 637 (ALT 250 FOR IP): Performed by: INTERNAL MEDICINE

## 2025-01-21 PROCEDURE — 94640 AIRWAY INHALATION TREATMENT: CPT

## 2025-01-21 PROCEDURE — 6360000002 HC RX W HCPCS: Performed by: INTERNAL MEDICINE

## 2025-01-21 PROCEDURE — 2060000000 HC ICU INTERMEDIATE R&B

## 2025-01-21 RX ORDER — CYCLOBENZAPRINE HCL 10 MG
5 TABLET ORAL 2 TIMES DAILY PRN
Status: DISCONTINUED | OUTPATIENT
Start: 2025-01-21 | End: 2025-01-23

## 2025-01-21 RX ADMIN — IPRATROPIUM BROMIDE AND ALBUTEROL SULFATE 1 DOSE: 2.5; .5 SOLUTION RESPIRATORY (INHALATION) at 07:43

## 2025-01-21 RX ADMIN — DIGOXIN 62.5 MCG: 0.12 TABLET ORAL at 09:11

## 2025-01-21 RX ADMIN — ENOXAPARIN SODIUM 80 MG: 100 INJECTION SUBCUTANEOUS at 09:13

## 2025-01-21 RX ADMIN — Medication 3 MG: at 19:39

## 2025-01-21 RX ADMIN — SODIUM CHLORIDE, PRESERVATIVE FREE 10 ML: 5 INJECTION INTRAVENOUS at 09:13

## 2025-01-21 RX ADMIN — ACETAMINOPHEN 650 MG: 325 TABLET ORAL at 11:44

## 2025-01-21 RX ADMIN — BUDESONIDE INHALATION 500 MCG: 0.5 SUSPENSION RESPIRATORY (INHALATION) at 20:28

## 2025-01-21 RX ADMIN — ARFORMOTEROL TARTRATE 15 MCG: 15 SOLUTION RESPIRATORY (INHALATION) at 20:28

## 2025-01-21 RX ADMIN — OXYCODONE 2.5 MG: 5 TABLET ORAL at 19:39

## 2025-01-21 RX ADMIN — SODIUM CHLORIDE, PRESERVATIVE FREE 10 ML: 5 INJECTION INTRAVENOUS at 19:39

## 2025-01-21 RX ADMIN — OXYCODONE HYDROCHLORIDE AND ACETAMINOPHEN 1 TABLET: 5; 325 TABLET ORAL at 19:39

## 2025-01-21 RX ADMIN — IPRATROPIUM BROMIDE AND ALBUTEROL SULFATE 1 DOSE: 2.5; .5 SOLUTION RESPIRATORY (INHALATION) at 20:28

## 2025-01-21 RX ADMIN — ARFORMOTEROL TARTRATE 15 MCG: 15 SOLUTION RESPIRATORY (INHALATION) at 07:43

## 2025-01-21 RX ADMIN — ENOXAPARIN SODIUM 80 MG: 100 INJECTION SUBCUTANEOUS at 19:39

## 2025-01-21 RX ADMIN — OXYCODONE HYDROCHLORIDE AND ACETAMINOPHEN 1 TABLET: 5; 325 TABLET ORAL at 06:55

## 2025-01-21 RX ADMIN — IPRATROPIUM BROMIDE AND ALBUTEROL SULFATE 1 DOSE: 2.5; .5 SOLUTION RESPIRATORY (INHALATION) at 16:48

## 2025-01-21 RX ADMIN — BUDESONIDE INHALATION 500 MCG: 0.5 SUSPENSION RESPIRATORY (INHALATION) at 07:43

## 2025-01-21 RX ADMIN — IPRATROPIUM BROMIDE AND ALBUTEROL SULFATE 1 DOSE: 2.5; .5 SOLUTION RESPIRATORY (INHALATION) at 11:57

## 2025-01-21 RX ADMIN — POTASSIUM CHLORIDE 20 MEQ: 20 TABLET, EXTENDED RELEASE ORAL at 09:12

## 2025-01-21 ASSESSMENT — PAIN DESCRIPTION - LOCATION
LOCATION: GROIN;HIP
LOCATION: HIP;GROIN
LOCATION: GROIN

## 2025-01-21 ASSESSMENT — PAIN SCALES - GENERAL
PAINLEVEL_OUTOF10: 5
PAINLEVEL_OUTOF10: 7
PAINLEVEL_OUTOF10: 8
PAINLEVEL_OUTOF10: 6

## 2025-01-21 ASSESSMENT — PAIN DESCRIPTION - ORIENTATION
ORIENTATION: LEFT

## 2025-01-21 ASSESSMENT — PAIN - FUNCTIONAL ASSESSMENT: PAIN_FUNCTIONAL_ASSESSMENT: PREVENTS OR INTERFERES SOME ACTIVE ACTIVITIES AND ADLS

## 2025-01-21 ASSESSMENT — PAIN DESCRIPTION - DESCRIPTORS
DESCRIPTORS: SHARP
DESCRIPTORS: SHARP

## 2025-01-21 ASSESSMENT — PAIN DESCRIPTION - FREQUENCY: FREQUENCY: INTERMITTENT

## 2025-01-21 ASSESSMENT — PAIN DESCRIPTION - PAIN TYPE: TYPE: ACUTE PAIN

## 2025-01-21 ASSESSMENT — PAIN DESCRIPTION - ONSET: ONSET: GRADUAL

## 2025-01-21 NOTE — PLAN OF CARE
Problem: ABCDS Injury Assessment  Goal: Absence of physical injury  1/20/2025 2234 by Willow Ramirez RN  Outcome: Progressing     Problem: Discharge Planning  Goal: Discharge to home or other facility with appropriate resources  1/20/2025 2234 by Willow Ramirez RN  Outcome: Progressing     Problem: Chronic Conditions and Co-morbidities  Goal: Patient's chronic conditions and co-morbidity symptoms are monitored and maintained or improved  1/20/2025 2234 by Willow Ramirez RN  Outcome: Progressing     Problem: Safety - Adult  Goal: Free from fall injury  1/20/2025 2234 by Willow Ramirez RN  Outcome: Progressing     Problem: Pain  Goal: Verbalizes/displays adequate comfort level or baseline comfort level  1/20/2025 2234 by Willow Ramirez RN  Outcome: Progressing

## 2025-01-21 NOTE — PLAN OF CARE
Problem: ABCDS Injury Assessment  Goal: Absence of physical injury  1/21/2025 1111 by Karime Lopez RN  Outcome: Progressing  Flowsheets (Taken 1/21/2025 0900)  Absence of Physical Injury: Implement safety measures based on patient assessment  1/20/2025 2234 by Willow Ramirez RN  Outcome: Progressing     Problem: Discharge Planning  Goal: Discharge to home or other facility with appropriate resources  1/21/2025 1111 by Karime Lopez RN  Outcome: Progressing  Flowsheets (Taken 1/21/2025 0900)  Discharge to home or other facility with appropriate resources: Identify barriers to discharge with patient and caregiver  1/20/2025 2234 by Willow Ramirez RN  Outcome: Progressing     Problem: Chronic Conditions and Co-morbidities  Goal: Patient's chronic conditions and co-morbidity symptoms are monitored and maintained or improved  1/21/2025 1111 by Karime Lopez RN  Outcome: Progressing  Flowsheets (Taken 1/21/2025 0900)  Care Plan - Patient's Chronic Conditions and Co-Morbidity Symptoms are Monitored and Maintained or Improved: Monitor and assess patient's chronic conditions and comorbid symptoms for stability, deterioration, or improvement  1/20/2025 2234 by Willow Ramirez RN  Outcome: Progressing     Problem: Safety - Adult  Goal: Free from fall injury  1/21/2025 1111 by Karime Lopez RN  Outcome: Progressing  Flowsheets (Taken 1/21/2025 0900)  Free From Fall Injury: Instruct family/caregiver on patient safety  1/20/2025 2234 by Willow Ramirez RN  Outcome: Progressing     Problem: Pain  Goal: Verbalizes/displays adequate comfort level or baseline comfort level  1/21/2025 1111 by Karime Lopez RN  Outcome: Progressing  1/20/2025 2234 by Willow Ramirez RN  Outcome: Progressing

## 2025-01-21 NOTE — PLAN OF CARE
Notified of patient complaining of anxiety. Nursing having difficulty obtaining an oxygen saturation since 1600. ABG at that time with pO2 73.3. Patient did not appear to be in respiratory distress, but color was ashen. ABG drawn, but likely venous. Placed on NRB with 15 L and was able to obtain oxygen saturation in the 90's. Placed on Airvo.

## 2025-01-22 LAB
ANION GAP SERPL CALCULATED.3IONS-SCNC: 8 MMOL/L (ref 7–16)
BASOPHILS # BLD: 0.02 K/UL (ref 0–0.2)
BASOPHILS NFR BLD: 0 % (ref 0–2)
BUN SERPL-MCNC: 16 MG/DL (ref 6–23)
CALCIUM SERPL-MCNC: 8.6 MG/DL (ref 8.6–10.2)
CHLORIDE SERPL-SCNC: 99 MMOL/L (ref 98–107)
CO2 SERPL-SCNC: 26 MMOL/L (ref 22–29)
CREAT SERPL-MCNC: 0.7 MG/DL (ref 0.7–1.2)
EOSINOPHIL # BLD: 0.09 K/UL (ref 0.05–0.5)
EOSINOPHILS RELATIVE PERCENT: 1 % (ref 0–6)
ERYTHROCYTE [DISTWIDTH] IN BLOOD BY AUTOMATED COUNT: 14.1 % (ref 11.5–15)
GFR, ESTIMATED: >90 ML/MIN/1.73M2
GLUCOSE SERPL-MCNC: 101 MG/DL (ref 74–99)
HCT VFR BLD AUTO: 33 % (ref 37–54)
HGB BLD-MCNC: 10.6 G/DL (ref 12.5–16.5)
IMM GRANULOCYTES # BLD AUTO: 0.16 K/UL (ref 0–0.58)
IMM GRANULOCYTES NFR BLD: 1 % (ref 0–5)
LYMPHOCYTES NFR BLD: 1.4 K/UL (ref 1.5–4)
LYMPHOCYTES RELATIVE PERCENT: 11 % (ref 20–42)
MCH RBC QN AUTO: 30.8 PG (ref 26–35)
MCHC RBC AUTO-ENTMCNC: 32.1 G/DL (ref 32–34.5)
MCV RBC AUTO: 95.9 FL (ref 80–99.9)
MONOCYTES NFR BLD: 1.06 K/UL (ref 0.1–0.95)
MONOCYTES NFR BLD: 8 % (ref 2–12)
NEUTROPHILS NFR BLD: 79 % (ref 43–80)
NEUTS SEG NFR BLD: 10.55 K/UL (ref 1.8–7.3)
PLATELET # BLD AUTO: 198 K/UL (ref 130–450)
PMV BLD AUTO: 10.6 FL (ref 7–12)
POTASSIUM SERPL-SCNC: 4.5 MMOL/L (ref 3.5–5)
RBC # BLD AUTO: 3.44 M/UL (ref 3.8–5.8)
SODIUM SERPL-SCNC: 133 MMOL/L (ref 132–146)
WBC OTHER # BLD: 13.3 K/UL (ref 4.5–11.5)

## 2025-01-22 PROCEDURE — 6360000002 HC RX W HCPCS

## 2025-01-22 PROCEDURE — 2060000000 HC ICU INTERMEDIATE R&B

## 2025-01-22 PROCEDURE — 6370000000 HC RX 637 (ALT 250 FOR IP)

## 2025-01-22 PROCEDURE — 85025 COMPLETE CBC W/AUTO DIFF WBC: CPT

## 2025-01-22 PROCEDURE — 6370000000 HC RX 637 (ALT 250 FOR IP): Performed by: INTERNAL MEDICINE

## 2025-01-22 PROCEDURE — 2500000003 HC RX 250 WO HCPCS

## 2025-01-22 PROCEDURE — 2700000000 HC OXYGEN THERAPY PER DAY

## 2025-01-22 PROCEDURE — 36415 COLL VENOUS BLD VENIPUNCTURE: CPT

## 2025-01-22 PROCEDURE — 80048 BASIC METABOLIC PNL TOTAL CA: CPT

## 2025-01-22 PROCEDURE — 94640 AIRWAY INHALATION TREATMENT: CPT

## 2025-01-22 PROCEDURE — 6360000002 HC RX W HCPCS: Performed by: INTERNAL MEDICINE

## 2025-01-22 PROCEDURE — 6370000000 HC RX 637 (ALT 250 FOR IP): Performed by: STUDENT IN AN ORGANIZED HEALTH CARE EDUCATION/TRAINING PROGRAM

## 2025-01-22 PROCEDURE — 99232 SBSQ HOSP IP/OBS MODERATE 35: CPT | Performed by: STUDENT IN AN ORGANIZED HEALTH CARE EDUCATION/TRAINING PROGRAM

## 2025-01-22 RX ORDER — OXYCODONE AND ACETAMINOPHEN 5; 325 MG/1; MG/1
1 TABLET ORAL EVERY 12 HOURS PRN
Status: DISCONTINUED | OUTPATIENT
Start: 2025-01-22 | End: 2025-01-27 | Stop reason: HOSPADM

## 2025-01-22 RX ORDER — OXYCODONE HYDROCHLORIDE 5 MG/1
2.5 TABLET ORAL EVERY 12 HOURS PRN
Status: DISCONTINUED | OUTPATIENT
Start: 2025-01-22 | End: 2025-01-27 | Stop reason: HOSPADM

## 2025-01-22 RX ADMIN — IPRATROPIUM BROMIDE AND ALBUTEROL SULFATE 1 DOSE: 2.5; .5 SOLUTION RESPIRATORY (INHALATION) at 19:40

## 2025-01-22 RX ADMIN — SODIUM CHLORIDE, PRESERVATIVE FREE 10 ML: 5 INJECTION INTRAVENOUS at 20:33

## 2025-01-22 RX ADMIN — IPRATROPIUM BROMIDE AND ALBUTEROL SULFATE 1 DOSE: 2.5; .5 SOLUTION RESPIRATORY (INHALATION) at 15:37

## 2025-01-22 RX ADMIN — Medication 3 MG: at 20:33

## 2025-01-22 RX ADMIN — BUDESONIDE INHALATION 500 MCG: 0.5 SUSPENSION RESPIRATORY (INHALATION) at 19:40

## 2025-01-22 RX ADMIN — ACETAMINOPHEN 650 MG: 325 TABLET ORAL at 17:14

## 2025-01-22 RX ADMIN — BUMETANIDE 1 MG: 0.25 INJECTION INTRAMUSCULAR; INTRAVENOUS at 08:36

## 2025-01-22 RX ADMIN — SPIRONOLACTONE 12.5 MG: 25 TABLET ORAL at 08:35

## 2025-01-22 RX ADMIN — BUDESONIDE INHALATION 500 MCG: 0.5 SUSPENSION RESPIRATORY (INHALATION) at 08:45

## 2025-01-22 RX ADMIN — BUMETANIDE 1 MG: 0.25 INJECTION INTRAMUSCULAR; INTRAVENOUS at 17:08

## 2025-01-22 RX ADMIN — POLYETHYLENE GLYCOL 3350 17 G: 17 POWDER, FOR SOLUTION ORAL at 10:22

## 2025-01-22 RX ADMIN — POTASSIUM CHLORIDE 20 MEQ: 20 TABLET, EXTENDED RELEASE ORAL at 08:35

## 2025-01-22 RX ADMIN — IPRATROPIUM BROMIDE AND ALBUTEROL SULFATE 1 DOSE: 2.5; .5 SOLUTION RESPIRATORY (INHALATION) at 11:24

## 2025-01-22 RX ADMIN — CYCLOBENZAPRINE 5 MG: 10 TABLET, FILM COATED ORAL at 06:51

## 2025-01-22 RX ADMIN — OXYCODONE HYDROCHLORIDE AND ACETAMINOPHEN 1 TABLET: 5; 325 TABLET ORAL at 08:36

## 2025-01-22 RX ADMIN — IPRATROPIUM BROMIDE AND ALBUTEROL SULFATE 1 DOSE: 2.5; .5 SOLUTION RESPIRATORY (INHALATION) at 08:45

## 2025-01-22 RX ADMIN — ACETAMINOPHEN 650 MG: 325 TABLET ORAL at 01:30

## 2025-01-22 RX ADMIN — DIGOXIN 62.5 MCG: 0.12 TABLET ORAL at 08:35

## 2025-01-22 RX ADMIN — OXYCODONE 2.5 MG: 5 TABLET ORAL at 20:33

## 2025-01-22 RX ADMIN — ENOXAPARIN SODIUM 80 MG: 100 INJECTION SUBCUTANEOUS at 20:33

## 2025-01-22 RX ADMIN — SODIUM CHLORIDE, PRESERVATIVE FREE 10 ML: 5 INJECTION INTRAVENOUS at 08:36

## 2025-01-22 RX ADMIN — ARFORMOTEROL TARTRATE 15 MCG: 15 SOLUTION RESPIRATORY (INHALATION) at 08:45

## 2025-01-22 RX ADMIN — ATENOLOL 25 MG: 50 TABLET ORAL at 08:35

## 2025-01-22 RX ADMIN — OXYCODONE HYDROCHLORIDE AND ACETAMINOPHEN 1 TABLET: 5; 325 TABLET ORAL at 20:32

## 2025-01-22 RX ADMIN — ENOXAPARIN SODIUM 80 MG: 100 INJECTION SUBCUTANEOUS at 08:35

## 2025-01-22 RX ADMIN — ARFORMOTEROL TARTRATE 15 MCG: 15 SOLUTION RESPIRATORY (INHALATION) at 19:40

## 2025-01-22 ASSESSMENT — PAIN SCALES - GENERAL
PAINLEVEL_OUTOF10: 7
PAINLEVEL_OUTOF10: 8
PAINLEVEL_OUTOF10: 9
PAINLEVEL_OUTOF10: 7
PAINLEVEL_OUTOF10: 5
PAINLEVEL_OUTOF10: 8

## 2025-01-22 ASSESSMENT — PAIN DESCRIPTION - ONSET: ONSET: GRADUAL

## 2025-01-22 ASSESSMENT — PAIN DESCRIPTION - LOCATION
LOCATION: GROIN
LOCATION: GROIN
LOCATION: GROIN;HIP
LOCATION: HIP;GROIN
LOCATION: HIP;BACK

## 2025-01-22 ASSESSMENT — PAIN DESCRIPTION - DESCRIPTORS
DESCRIPTORS: ACHING
DESCRIPTORS: SHARP;SORE
DESCRIPTORS: SHARP;SORE
DESCRIPTORS: SHARP
DESCRIPTORS: ACHING

## 2025-01-22 ASSESSMENT — PAIN DESCRIPTION - ORIENTATION
ORIENTATION: LEFT

## 2025-01-22 ASSESSMENT — PAIN - FUNCTIONAL ASSESSMENT
PAIN_FUNCTIONAL_ASSESSMENT: PREVENTS OR INTERFERES SOME ACTIVE ACTIVITIES AND ADLS
PAIN_FUNCTIONAL_ASSESSMENT: ACTIVITIES ARE NOT PREVENTED
PAIN_FUNCTIONAL_ASSESSMENT: ACTIVITIES ARE NOT PREVENTED

## 2025-01-22 ASSESSMENT — PAIN DESCRIPTION - FREQUENCY: FREQUENCY: INTERMITTENT

## 2025-01-22 ASSESSMENT — PAIN DESCRIPTION - PAIN TYPE: TYPE: ACUTE PAIN

## 2025-01-22 NOTE — PLAN OF CARE
Problem: ABCDS Injury Assessment  Goal: Absence of physical injury  1/21/2025 2113 by Willow Ramirez RN  Outcome: Progressing     Problem: Discharge Planning  Goal: Discharge to home or other facility with appropriate resources  1/21/2025 2113 by Willow Ramirez RN  Outcome: Progressing     Problem: Chronic Conditions and Co-morbidities  Goal: Patient's chronic conditions and co-morbidity symptoms are monitored and maintained or improved  1/21/2025 2113 by Willow Ramirez RN  Outcome: Progressing     Problem: Safety - Adult  Goal: Free from fall injury  1/21/2025 2113 by Willow Ramirez RN  Outcome: Progressing     Problem: Pain  Goal: Verbalizes/displays adequate comfort level or baseline comfort level  1/21/2025 2113 by Willow Ramirez RN  Outcome: Progressing

## 2025-01-22 NOTE — PLAN OF CARE
Problem: ABCDS Injury Assessment  Goal: Absence of physical injury  1/22/2025 1045 by Estephania Murphy RN  Outcome: Progressing  Flowsheets (Taken 1/22/2025 0830)  Absence of Physical Injury: Implement safety measures based on patient assessment  1/21/2025 2113 by Willow Ramirez RN  Outcome: Progressing     Problem: Discharge Planning  Goal: Discharge to home or other facility with appropriate resources  1/22/2025 1045 by Estephania Murphy RN  Outcome: Progressing  Flowsheets (Taken 1/22/2025 0830)  Discharge to home or other facility with appropriate resources: Identify barriers to discharge with patient and caregiver  1/21/2025 2113 by Willow Ramirez RN  Outcome: Progressing     Problem: Chronic Conditions and Co-morbidities  Goal: Patient's chronic conditions and co-morbidity symptoms are monitored and maintained or improved  1/22/2025 1045 by Estephania Murphy RN  Outcome: Progressing  Flowsheets (Taken 1/22/2025 0830)  Care Plan - Patient's Chronic Conditions and Co-Morbidity Symptoms are Monitored and Maintained or Improved: Monitor and assess patient's chronic conditions and comorbid symptoms for stability, deterioration, or improvement  1/21/2025 2113 by Willow Ramirez RN  Outcome: Progressing

## 2025-01-23 ENCOUNTER — APPOINTMENT (OUTPATIENT)
Dept: GENERAL RADIOLOGY | Age: 75
DRG: 189 | End: 2025-01-23
Payer: MEDICARE

## 2025-01-23 ENCOUNTER — APPOINTMENT (OUTPATIENT)
Dept: ULTRASOUND IMAGING | Age: 75
DRG: 189 | End: 2025-01-23
Payer: MEDICARE

## 2025-01-23 LAB
ANION GAP SERPL CALCULATED.3IONS-SCNC: 12 MMOL/L (ref 7–16)
BASOPHILS # BLD: 0.02 K/UL (ref 0–0.2)
BASOPHILS NFR BLD: 0 % (ref 0–2)
BUN SERPL-MCNC: 15 MG/DL (ref 6–23)
CALCIUM SERPL-MCNC: 8.7 MG/DL (ref 8.6–10.2)
CHLORIDE SERPL-SCNC: 97 MMOL/L (ref 98–107)
CO2 SERPL-SCNC: 23 MMOL/L (ref 22–29)
CREAT SERPL-MCNC: 0.7 MG/DL (ref 0.7–1.2)
EOSINOPHIL # BLD: 0.09 K/UL (ref 0.05–0.5)
EOSINOPHILS RELATIVE PERCENT: 1 % (ref 0–6)
ERYTHROCYTE [DISTWIDTH] IN BLOOD BY AUTOMATED COUNT: 14.3 % (ref 11.5–15)
GFR, ESTIMATED: >90 ML/MIN/1.73M2
GLUCOSE SERPL-MCNC: 91 MG/DL (ref 74–99)
HCT VFR BLD AUTO: 28.7 % (ref 37–54)
HGB BLD-MCNC: 9 G/DL (ref 12.5–16.5)
IMM GRANULOCYTES # BLD AUTO: 0.21 K/UL (ref 0–0.58)
IMM GRANULOCYTES NFR BLD: 1 % (ref 0–5)
LYMPHOCYTES NFR BLD: 1.21 K/UL (ref 1.5–4)
LYMPHOCYTES RELATIVE PERCENT: 8 % (ref 20–42)
MCH RBC QN AUTO: 31.3 PG (ref 26–35)
MCHC RBC AUTO-ENTMCNC: 31.4 G/DL (ref 32–34.5)
MCV RBC AUTO: 99.7 FL (ref 80–99.9)
MONOCYTES NFR BLD: 1.18 K/UL (ref 0.1–0.95)
MONOCYTES NFR BLD: 7 % (ref 2–12)
NEUTROPHILS NFR BLD: 83 % (ref 43–80)
NEUTS SEG NFR BLD: 13.47 K/UL (ref 1.8–7.3)
PLATELET # BLD AUTO: 198 K/UL (ref 130–450)
PMV BLD AUTO: 10.4 FL (ref 7–12)
POTASSIUM SERPL-SCNC: 4.8 MMOL/L (ref 3.5–5)
RBC # BLD AUTO: 2.88 M/UL (ref 3.8–5.8)
SODIUM SERPL-SCNC: 132 MMOL/L (ref 132–146)
WBC OTHER # BLD: 16.2 K/UL (ref 4.5–11.5)

## 2025-01-23 PROCEDURE — 2700000000 HC OXYGEN THERAPY PER DAY

## 2025-01-23 PROCEDURE — 6370000000 HC RX 637 (ALT 250 FOR IP)

## 2025-01-23 PROCEDURE — 94640 AIRWAY INHALATION TREATMENT: CPT

## 2025-01-23 PROCEDURE — 85025 COMPLETE CBC W/AUTO DIFF WBC: CPT

## 2025-01-23 PROCEDURE — 6360000002 HC RX W HCPCS

## 2025-01-23 PROCEDURE — 76999 ECHO EXAMINATION PROCEDURE: CPT

## 2025-01-23 PROCEDURE — 36415 COLL VENOUS BLD VENIPUNCTURE: CPT

## 2025-01-23 PROCEDURE — 71045 X-RAY EXAM CHEST 1 VIEW: CPT

## 2025-01-23 PROCEDURE — 2500000003 HC RX 250 WO HCPCS

## 2025-01-23 PROCEDURE — 72170 X-RAY EXAM OF PELVIS: CPT

## 2025-01-23 PROCEDURE — 99232 SBSQ HOSP IP/OBS MODERATE 35: CPT | Performed by: STUDENT IN AN ORGANIZED HEALTH CARE EDUCATION/TRAINING PROGRAM

## 2025-01-23 PROCEDURE — 73552 X-RAY EXAM OF FEMUR 2/>: CPT

## 2025-01-23 PROCEDURE — 2060000000 HC ICU INTERMEDIATE R&B

## 2025-01-23 PROCEDURE — 6370000000 HC RX 637 (ALT 250 FOR IP): Performed by: INTERNAL MEDICINE

## 2025-01-23 PROCEDURE — 6360000002 HC RX W HCPCS: Performed by: INTERNAL MEDICINE

## 2025-01-23 PROCEDURE — 6370000000 HC RX 637 (ALT 250 FOR IP): Performed by: STUDENT IN AN ORGANIZED HEALTH CARE EDUCATION/TRAINING PROGRAM

## 2025-01-23 PROCEDURE — 80048 BASIC METABOLIC PNL TOTAL CA: CPT

## 2025-01-23 RX ORDER — CYCLOBENZAPRINE HCL 10 MG
5 TABLET ORAL 3 TIMES DAILY PRN
Status: DISCONTINUED | OUTPATIENT
Start: 2025-01-23 | End: 2025-01-27 | Stop reason: HOSPADM

## 2025-01-23 RX ADMIN — DIGOXIN 62.5 MCG: 0.12 TABLET ORAL at 09:33

## 2025-01-23 RX ADMIN — ATENOLOL 25 MG: 50 TABLET ORAL at 09:33

## 2025-01-23 RX ADMIN — BUMETANIDE 1 MG: 0.25 INJECTION INTRAMUSCULAR; INTRAVENOUS at 09:33

## 2025-01-23 RX ADMIN — ACETAMINOPHEN 650 MG: 325 TABLET ORAL at 00:06

## 2025-01-23 RX ADMIN — IPRATROPIUM BROMIDE AND ALBUTEROL SULFATE 1 DOSE: 2.5; .5 SOLUTION RESPIRATORY (INHALATION) at 15:38

## 2025-01-23 RX ADMIN — BUDESONIDE INHALATION 500 MCG: 0.5 SUSPENSION RESPIRATORY (INHALATION) at 20:45

## 2025-01-23 RX ADMIN — ACETAMINOPHEN 650 MG: 325 TABLET ORAL at 20:30

## 2025-01-23 RX ADMIN — IPRATROPIUM BROMIDE AND ALBUTEROL SULFATE 1 DOSE: 2.5; .5 SOLUTION RESPIRATORY (INHALATION) at 20:45

## 2025-01-23 RX ADMIN — SODIUM CHLORIDE, PRESERVATIVE FREE 10 ML: 5 INJECTION INTRAVENOUS at 20:27

## 2025-01-23 RX ADMIN — ENOXAPARIN SODIUM 80 MG: 100 INJECTION SUBCUTANEOUS at 09:33

## 2025-01-23 RX ADMIN — ARFORMOTEROL TARTRATE 15 MCG: 15 SOLUTION RESPIRATORY (INHALATION) at 07:47

## 2025-01-23 RX ADMIN — SPIRONOLACTONE 12.5 MG: 25 TABLET ORAL at 09:36

## 2025-01-23 RX ADMIN — CYCLOBENZAPRINE 5 MG: 10 TABLET, FILM COATED ORAL at 00:06

## 2025-01-23 RX ADMIN — SODIUM CHLORIDE, PRESERVATIVE FREE 10 ML: 5 INJECTION INTRAVENOUS at 09:33

## 2025-01-23 RX ADMIN — IPRATROPIUM BROMIDE AND ALBUTEROL SULFATE 1 DOSE: 2.5; .5 SOLUTION RESPIRATORY (INHALATION) at 12:50

## 2025-01-23 RX ADMIN — OXYCODONE HYDROCHLORIDE AND ACETAMINOPHEN 1 TABLET: 5; 325 TABLET ORAL at 21:36

## 2025-01-23 RX ADMIN — POTASSIUM CHLORIDE 20 MEQ: 20 TABLET, EXTENDED RELEASE ORAL at 09:33

## 2025-01-23 RX ADMIN — ARFORMOTEROL TARTRATE 15 MCG: 15 SOLUTION RESPIRATORY (INHALATION) at 20:45

## 2025-01-23 RX ADMIN — OXYCODONE 2.5 MG: 5 TABLET ORAL at 09:33

## 2025-01-23 RX ADMIN — CYCLOBENZAPRINE 5 MG: 10 TABLET, FILM COATED ORAL at 12:13

## 2025-01-23 RX ADMIN — IPRATROPIUM BROMIDE AND ALBUTEROL SULFATE 1 DOSE: 2.5; .5 SOLUTION RESPIRATORY (INHALATION) at 07:47

## 2025-01-23 RX ADMIN — BUDESONIDE INHALATION 500 MCG: 0.5 SUSPENSION RESPIRATORY (INHALATION) at 07:47

## 2025-01-23 ASSESSMENT — PAIN DESCRIPTION - LOCATION
LOCATION: GROIN;LEG
LOCATION: GROIN;LEG
LOCATION: GROIN

## 2025-01-23 ASSESSMENT — PAIN DESCRIPTION - ORIENTATION
ORIENTATION: LEFT
ORIENTATION: LEFT
ORIENTATION: UPPER;LEFT
ORIENTATION: LEFT
ORIENTATION: LEFT

## 2025-01-23 ASSESSMENT — PAIN DESCRIPTION - ONSET
ONSET: ON-GOING
ONSET: ON-GOING
ONSET: GRADUAL

## 2025-01-23 ASSESSMENT — PAIN - FUNCTIONAL ASSESSMENT
PAIN_FUNCTIONAL_ASSESSMENT: PREVENTS OR INTERFERES SOME ACTIVE ACTIVITIES AND ADLS

## 2025-01-23 ASSESSMENT — PAIN DESCRIPTION - FREQUENCY
FREQUENCY: INTERMITTENT
FREQUENCY: CONTINUOUS
FREQUENCY: CONTINUOUS

## 2025-01-23 ASSESSMENT — PAIN SCALES - GENERAL
PAINLEVEL_OUTOF10: 10
PAINLEVEL_OUTOF10: 7
PAINLEVEL_OUTOF10: 4
PAINLEVEL_OUTOF10: 8
PAINLEVEL_OUTOF10: 10
PAINLEVEL_OUTOF10: 10
PAINLEVEL_OUTOF10: 9
PAINLEVEL_OUTOF10: 10
PAINLEVEL_OUTOF10: 0

## 2025-01-23 ASSESSMENT — PAIN DESCRIPTION - DESCRIPTORS
DESCRIPTORS: ACHING;DISCOMFORT;CRUSHING
DESCRIPTORS: SHARP
DESCRIPTORS: ACHING;SORE;SHARP
DESCRIPTORS: ACHING;SORE;SHARP

## 2025-01-23 ASSESSMENT — PAIN DESCRIPTION - PAIN TYPE
TYPE: ACUTE PAIN

## 2025-01-23 NOTE — PLAN OF CARE
Problem: ABCDS Injury Assessment  Goal: Absence of physical injury  1/22/2025 2101 by Willow Ramirez RN  Outcome: Progressing     Problem: Discharge Planning  Goal: Discharge to home or other facility with appropriate resources  1/22/2025 2101 by Willow Ramirez RN  Outcome: Progressing     Problem: Chronic Conditions and Co-morbidities  Goal: Patient's chronic conditions and co-morbidity symptoms are monitored and maintained or improved  1/22/2025 2101 by Willow Ramirez RN  Outcome: Progressing     Problem: Safety - Adult  Goal: Free from fall injury  1/22/2025 2101 by Willow Ramirez RN  Outcome: Progressing     Problem: Pain  Goal: Verbalizes/displays adequate comfort level or baseline comfort level  1/22/2025 2101 by Willow Ramirez RN  Outcome: Progressing

## 2025-01-24 LAB
ANION GAP SERPL CALCULATED.3IONS-SCNC: 9 MMOL/L (ref 7–16)
BASOPHILS # BLD: 0.01 K/UL (ref 0–0.2)
BASOPHILS NFR BLD: 0 % (ref 0–2)
BUN SERPL-MCNC: 18 MG/DL (ref 6–23)
CALCIUM SERPL-MCNC: 8.6 MG/DL (ref 8.6–10.2)
CHLORIDE SERPL-SCNC: 96 MMOL/L (ref 98–107)
CO2 SERPL-SCNC: 26 MMOL/L (ref 22–29)
CREAT SERPL-MCNC: 0.8 MG/DL (ref 0.7–1.2)
EOSINOPHIL # BLD: 0.05 K/UL (ref 0.05–0.5)
EOSINOPHILS RELATIVE PERCENT: 0 % (ref 0–6)
ERYTHROCYTE [DISTWIDTH] IN BLOOD BY AUTOMATED COUNT: 14.3 % (ref 11.5–15)
GFR, ESTIMATED: >90 ML/MIN/1.73M2
GLUCOSE SERPL-MCNC: 118 MG/DL (ref 74–99)
HCT VFR BLD AUTO: 24.8 % (ref 37–54)
HGB BLD-MCNC: 8.2 G/DL (ref 12.5–16.5)
IMM GRANULOCYTES # BLD AUTO: 0.21 K/UL (ref 0–0.58)
IMM GRANULOCYTES NFR BLD: 1 % (ref 0–5)
LACTATE BLDV-SCNC: 1.9 MMOL/L (ref 0.5–2.2)
LYMPHOCYTES NFR BLD: 1.53 K/UL (ref 1.5–4)
LYMPHOCYTES RELATIVE PERCENT: 9 % (ref 20–42)
MCH RBC QN AUTO: 31.4 PG (ref 26–35)
MCHC RBC AUTO-ENTMCNC: 33.1 G/DL (ref 32–34.5)
MCV RBC AUTO: 95 FL (ref 80–99.9)
MONOCYTES NFR BLD: 1.11 K/UL (ref 0.1–0.95)
MONOCYTES NFR BLD: 7 % (ref 2–12)
NEUTROPHILS NFR BLD: 82 % (ref 43–80)
NEUTS SEG NFR BLD: 13.56 K/UL (ref 1.8–7.3)
PLATELET # BLD AUTO: 180 K/UL (ref 130–450)
PMV BLD AUTO: 10.3 FL (ref 7–12)
POTASSIUM SERPL-SCNC: 4.4 MMOL/L (ref 3.5–5)
RBC # BLD AUTO: 2.61 M/UL (ref 3.8–5.8)
SODIUM SERPL-SCNC: 131 MMOL/L (ref 132–146)
WBC OTHER # BLD: 16.5 K/UL (ref 4.5–11.5)

## 2025-01-24 PROCEDURE — 94640 AIRWAY INHALATION TREATMENT: CPT

## 2025-01-24 PROCEDURE — 6370000000 HC RX 637 (ALT 250 FOR IP): Performed by: INTERNAL MEDICINE

## 2025-01-24 PROCEDURE — 83605 ASSAY OF LACTIC ACID: CPT

## 2025-01-24 PROCEDURE — 85025 COMPLETE CBC W/AUTO DIFF WBC: CPT

## 2025-01-24 PROCEDURE — 6370000000 HC RX 637 (ALT 250 FOR IP)

## 2025-01-24 PROCEDURE — 2060000000 HC ICU INTERMEDIATE R&B

## 2025-01-24 PROCEDURE — 2700000000 HC OXYGEN THERAPY PER DAY

## 2025-01-24 PROCEDURE — 2580000003 HC RX 258: Performed by: INTERNAL MEDICINE

## 2025-01-24 PROCEDURE — 2500000003 HC RX 250 WO HCPCS

## 2025-01-24 PROCEDURE — 80048 BASIC METABOLIC PNL TOTAL CA: CPT

## 2025-01-24 PROCEDURE — 99233 SBSQ HOSP IP/OBS HIGH 50: CPT | Performed by: INTERNAL MEDICINE

## 2025-01-24 PROCEDURE — 6360000002 HC RX W HCPCS

## 2025-01-24 RX ORDER — 0.9 % SODIUM CHLORIDE 0.9 %
250 INTRAVENOUS SOLUTION INTRAVENOUS ONCE
Status: COMPLETED | OUTPATIENT
Start: 2025-01-24 | End: 2025-01-24

## 2025-01-24 RX ORDER — BISACODYL 5 MG/1
15 TABLET, DELAYED RELEASE ORAL DAILY
Status: DISPENSED | OUTPATIENT
Start: 2025-01-24 | End: 2025-01-26

## 2025-01-24 RX ADMIN — SODIUM CHLORIDE, PRESERVATIVE FREE 10 ML: 5 INJECTION INTRAVENOUS at 21:09

## 2025-01-24 RX ADMIN — SODIUM CHLORIDE 250 ML: 9 INJECTION, SOLUTION INTRAVENOUS at 18:22

## 2025-01-24 RX ADMIN — IPRATROPIUM BROMIDE AND ALBUTEROL SULFATE 1 DOSE: 2.5; .5 SOLUTION RESPIRATORY (INHALATION) at 08:01

## 2025-01-24 RX ADMIN — IPRATROPIUM BROMIDE AND ALBUTEROL SULFATE 1 DOSE: 2.5; .5 SOLUTION RESPIRATORY (INHALATION) at 15:53

## 2025-01-24 RX ADMIN — BUDESONIDE INHALATION 500 MCG: 0.5 SUSPENSION RESPIRATORY (INHALATION) at 08:01

## 2025-01-24 RX ADMIN — IPRATROPIUM BROMIDE AND ALBUTEROL SULFATE 1 DOSE: 2.5; .5 SOLUTION RESPIRATORY (INHALATION) at 19:34

## 2025-01-24 RX ADMIN — SODIUM CHLORIDE, PRESERVATIVE FREE 10 ML: 5 INJECTION INTRAVENOUS at 09:28

## 2025-01-24 RX ADMIN — SODIUM CHLORIDE 250 ML: 9 INJECTION, SOLUTION INTRAVENOUS at 10:57

## 2025-01-24 RX ADMIN — DIGOXIN 62.5 MCG: 0.12 TABLET ORAL at 09:27

## 2025-01-24 RX ADMIN — IPRATROPIUM BROMIDE AND ALBUTEROL SULFATE 1 DOSE: 2.5; .5 SOLUTION RESPIRATORY (INHALATION) at 11:31

## 2025-01-24 RX ADMIN — POTASSIUM CHLORIDE 20 MEQ: 20 TABLET, EXTENDED RELEASE ORAL at 09:27

## 2025-01-24 RX ADMIN — BUDESONIDE INHALATION 500 MCG: 0.5 SUSPENSION RESPIRATORY (INHALATION) at 19:34

## 2025-01-24 RX ADMIN — ARFORMOTEROL TARTRATE 15 MCG: 15 SOLUTION RESPIRATORY (INHALATION) at 08:01

## 2025-01-24 RX ADMIN — ACETAMINOPHEN 650 MG: 325 TABLET ORAL at 05:21

## 2025-01-24 RX ADMIN — ARFORMOTEROL TARTRATE 15 MCG: 15 SOLUTION RESPIRATORY (INHALATION) at 19:34

## 2025-01-24 ASSESSMENT — PAIN DESCRIPTION - DESCRIPTORS
DESCRIPTORS: ACHING;DISCOMFORT;CRUSHING
DESCRIPTORS: TENDER

## 2025-01-24 ASSESSMENT — PAIN SCALES - GENERAL
PAINLEVEL_OUTOF10: 8
PAINLEVEL_OUTOF10: 4
PAINLEVEL_OUTOF10: 4
PAINLEVEL_OUTOF10: 3

## 2025-01-24 ASSESSMENT — PAIN DESCRIPTION - ORIENTATION
ORIENTATION: LEFT
ORIENTATION: LEFT

## 2025-01-24 ASSESSMENT — PAIN DESCRIPTION - FREQUENCY: FREQUENCY: CONTINUOUS

## 2025-01-24 ASSESSMENT — PAIN DESCRIPTION - LOCATION
LOCATION: GROIN;LEG
LOCATION: GROIN;LEG

## 2025-01-24 ASSESSMENT — PAIN DESCRIPTION - PAIN TYPE: TYPE: ACUTE PAIN

## 2025-01-24 ASSESSMENT — PAIN DESCRIPTION - ONSET: ONSET: ON-GOING

## 2025-01-24 ASSESSMENT — PAIN - FUNCTIONAL ASSESSMENT: PAIN_FUNCTIONAL_ASSESSMENT: PREVENTS OR INTERFERES SOME ACTIVE ACTIVITIES AND ADLS

## 2025-01-24 NOTE — CARE COORDINATION
Social Work / Discharge Planning : Oxygen requirements increasing FI02 60 and 45 High flow. [facility] called to Michael at [facility] to see if patient has criteria. AWait response. If does, will discuss option with patient. Patient form HOME independently. Normal BL is 6 to 8 liters. COPD. SW to follow. Electronically signed by MALVIN Giles on 1/24/25 at 10:04 AM EST     Addendum:Michael from [facility] verified patient has criteria. SW met with patient at length and discussed current status and slow weaning process. Patient in agreement with [facility]  . Michael will need notified when ready for LTAC to see if bed is still available.Charge RN updated. N 17 generated and transport forms completed. NO pre-cert needed. SW to follow. Electronically signed by MALVIN Giles on 1/24/25 at 12:49 PM EST

## 2025-01-24 NOTE — DISCHARGE INSTR - COC
Continuity of Care Form    Patient Name: Jason Cox   :  1950  MRN:  [id number]    Admit date:  2025  Discharge date:  25    Code Status Order: Limited   Advance Directives:   Advance Care Flowsheet Documentation             Admitting Physician:  Irma Kam MD  PCP: Juni Loyd MD    Discharging Nurse: Zo Newman RN  Discharging Hospital Unit/Room#: 0638/0638-A  Discharging Unit Phone Number: [phone]    Emergency Contact:   Extended Emergency Contact Information  Primary Emergency Contact: Tara Cox  Address: [address] [place] [address]  Home Phone: [phone]  Relation: Spouse    Past Surgical History:  Past Surgical History:   Procedure Laterality Date    ABSCESS DRAINAGE  2006    X2 RECTAL ABSCESS    BRONCHOSCOPY N/A 2019    BRONCHOSCOPY BRUSHINGS performed by Rolf Little MD at [facility] ENDOSCOPY    BRONCHOSCOPY N/A 10/07/2019    BRONCHOSCOPY DIAGNOSTIC OR CELL WASH ONLY performed by Rolf Little MD at [facility] ENDOSCOPY    BRONCHOSCOPY N/A 2021    BRONCHOSCOPY performed by Rolf Little MD at [facility] OR    BRONCHOSCOPY Left 2024    BRONCHOSCOPY DIAGNOSTIC OR CELL WASH ONLY performed by Rolf Little MD at [facility] ENDOSCOPY    BRONCHOSCOPY N/A 2024    BRONCHOSCOPY ALVEOLAR LAVAGE performed by Rolf Little MD at [facility] ENDOSCOPY    BRONCHOSCOPY N/A 10/14/2024    BRONCHOSCOPY performed by Rolf Little MD at [facility] ENDOSCOPY    BRONCHOSCOPY N/A 2024    BRONCHOSCOPY DIAGNOSTIC OR CELL WASH ONLY performed by Rolf Little MD at [facility] ENDOSCOPY    COLONOSCOPY  2013    COLONOSCOPY N/A 2023    COLONOSCOPY POLYPECTOMY SNARE/COLD BIOPSY performed by Frankie Portillo MD at [facility] ENDOSCOPY    HC INSERT PICC CATH, 5/> YRS  04/10/2021    REMOVED    LUMBAR FUSION  2019    [facility]       Immunization History:   Immunization History   Administered Date(s)

## 2025-01-24 NOTE — PLAN OF CARE

## 2025-01-25 LAB
ANION GAP SERPL CALCULATED.3IONS-SCNC: 10 MMOL/L (ref 7–16)
BASOPHILS # BLD: 0.02 K/UL (ref 0–0.2)
BASOPHILS NFR BLD: 0 % (ref 0–2)
BUN SERPL-MCNC: 14 MG/DL (ref 6–23)
CALCIUM SERPL-MCNC: 8.7 MG/DL (ref 8.6–10.2)
CHLORIDE SERPL-SCNC: 97 MMOL/L (ref 98–107)
CO2 SERPL-SCNC: 25 MMOL/L (ref 22–29)
CREAT SERPL-MCNC: 0.6 MG/DL (ref 0.7–1.2)
EOSINOPHIL # BLD: 0.03 K/UL (ref 0.05–0.5)
EOSINOPHILS RELATIVE PERCENT: 0 % (ref 0–6)
ERYTHROCYTE [DISTWIDTH] IN BLOOD BY AUTOMATED COUNT: 14.7 % (ref 11.5–15)
GFR, ESTIMATED: >90 ML/MIN/1.73M2
GLUCOSE SERPL-MCNC: 105 MG/DL (ref 74–99)
HCT VFR BLD AUTO: 24.5 % (ref 37–54)
HGB BLD-MCNC: 8.1 G/DL (ref 12.5–16.5)
IMM GRANULOCYTES # BLD AUTO: 0.25 K/UL (ref 0–0.58)
IMM GRANULOCYTES NFR BLD: 2 % (ref 0–5)
LACTATE BLDV-SCNC: 1.6 MMOL/L (ref 0.5–2.2)
LYMPHOCYTES NFR BLD: 1.35 K/UL (ref 1.5–4)
LYMPHOCYTES RELATIVE PERCENT: 8 % (ref 20–42)
MCH RBC QN AUTO: 31.8 PG (ref 26–35)
MCHC RBC AUTO-ENTMCNC: 33.1 G/DL (ref 32–34.5)
MCV RBC AUTO: 96.1 FL (ref 80–99.9)
MONOCYTES NFR BLD: 1.09 K/UL (ref 0.1–0.95)
MONOCYTES NFR BLD: 6 % (ref 2–12)
NEUTROPHILS NFR BLD: 84 % (ref 43–80)
NEUTS SEG NFR BLD: 14.45 K/UL (ref 1.8–7.3)
PLATELET # BLD AUTO: 199 K/UL (ref 130–450)
PMV BLD AUTO: 10.5 FL (ref 7–12)
POTASSIUM SERPL-SCNC: 4.3 MMOL/L (ref 3.5–5)
PROCALCITONIN SERPL-MCNC: 0.21 NG/ML (ref 0–0.08)
RBC # BLD AUTO: 2.55 M/UL (ref 3.8–5.8)
SODIUM SERPL-SCNC: 132 MMOL/L (ref 132–146)
WBC OTHER # BLD: 17.2 K/UL (ref 4.5–11.5)

## 2025-01-25 PROCEDURE — 6370000000 HC RX 637 (ALT 250 FOR IP): Performed by: INTERNAL MEDICINE

## 2025-01-25 PROCEDURE — 6370000000 HC RX 637 (ALT 250 FOR IP)

## 2025-01-25 PROCEDURE — 99232 SBSQ HOSP IP/OBS MODERATE 35: CPT | Performed by: STUDENT IN AN ORGANIZED HEALTH CARE EDUCATION/TRAINING PROGRAM

## 2025-01-25 PROCEDURE — 2700000000 HC OXYGEN THERAPY PER DAY

## 2025-01-25 PROCEDURE — 84145 PROCALCITONIN (PCT): CPT

## 2025-01-25 PROCEDURE — 83605 ASSAY OF LACTIC ACID: CPT

## 2025-01-25 PROCEDURE — 94640 AIRWAY INHALATION TREATMENT: CPT

## 2025-01-25 PROCEDURE — 2060000000 HC ICU INTERMEDIATE R&B

## 2025-01-25 PROCEDURE — 2500000003 HC RX 250 WO HCPCS

## 2025-01-25 PROCEDURE — 6370000000 HC RX 637 (ALT 250 FOR IP): Performed by: STUDENT IN AN ORGANIZED HEALTH CARE EDUCATION/TRAINING PROGRAM

## 2025-01-25 PROCEDURE — 85025 COMPLETE CBC W/AUTO DIFF WBC: CPT

## 2025-01-25 PROCEDURE — 2580000003 HC RX 258: Performed by: STUDENT IN AN ORGANIZED HEALTH CARE EDUCATION/TRAINING PROGRAM

## 2025-01-25 PROCEDURE — 6360000002 HC RX W HCPCS

## 2025-01-25 PROCEDURE — 80048 BASIC METABOLIC PNL TOTAL CA: CPT

## 2025-01-25 RX ORDER — MIDODRINE HYDROCHLORIDE 5 MG/1
2.5 TABLET ORAL
Status: DISCONTINUED | OUTPATIENT
Start: 2025-01-25 | End: 2025-01-26

## 2025-01-25 RX ORDER — 0.9 % SODIUM CHLORIDE 0.9 %
250 INTRAVENOUS SOLUTION INTRAVENOUS ONCE
Status: COMPLETED | OUTPATIENT
Start: 2025-01-25 | End: 2025-01-25

## 2025-01-25 RX ADMIN — SODIUM CHLORIDE, PRESERVATIVE FREE 10 ML: 5 INJECTION INTRAVENOUS at 21:37

## 2025-01-25 RX ADMIN — MIDODRINE HYDROCHLORIDE 2.5 MG: 5 TABLET ORAL at 15:36

## 2025-01-25 RX ADMIN — DIGOXIN 62.5 MCG: 0.12 TABLET ORAL at 08:00

## 2025-01-25 RX ADMIN — ARFORMOTEROL TARTRATE 15 MCG: 15 SOLUTION RESPIRATORY (INHALATION) at 20:31

## 2025-01-25 RX ADMIN — SODIUM CHLORIDE 250 ML: 9 INJECTION, SOLUTION INTRAVENOUS at 08:45

## 2025-01-25 RX ADMIN — ACETAMINOPHEN 650 MG: 325 TABLET ORAL at 10:24

## 2025-01-25 RX ADMIN — SODIUM CHLORIDE, PRESERVATIVE FREE 10 ML: 5 INJECTION INTRAVENOUS at 08:00

## 2025-01-25 RX ADMIN — BUDESONIDE INHALATION 500 MCG: 0.5 SUSPENSION RESPIRATORY (INHALATION) at 20:31

## 2025-01-25 RX ADMIN — IPRATROPIUM BROMIDE AND ALBUTEROL SULFATE 1 DOSE: 2.5; .5 SOLUTION RESPIRATORY (INHALATION) at 08:48

## 2025-01-25 RX ADMIN — POTASSIUM CHLORIDE 20 MEQ: 20 TABLET, EXTENDED RELEASE ORAL at 08:00

## 2025-01-25 RX ADMIN — ACETAMINOPHEN 650 MG: 325 TABLET ORAL at 02:29

## 2025-01-25 RX ADMIN — MIDODRINE HYDROCHLORIDE 2.5 MG: 5 TABLET ORAL at 08:42

## 2025-01-25 RX ADMIN — BUDESONIDE INHALATION 500 MCG: 0.5 SUSPENSION RESPIRATORY (INHALATION) at 08:48

## 2025-01-25 RX ADMIN — IPRATROPIUM BROMIDE AND ALBUTEROL SULFATE 1 DOSE: 2.5; .5 SOLUTION RESPIRATORY (INHALATION) at 12:32

## 2025-01-25 RX ADMIN — BISACODYL 15 MG: 5 TABLET, COATED ORAL at 21:36

## 2025-01-25 RX ADMIN — ARFORMOTEROL TARTRATE 15 MCG: 15 SOLUTION RESPIRATORY (INHALATION) at 08:48

## 2025-01-25 RX ADMIN — ACETAMINOPHEN 650 MG: 325 TABLET ORAL at 21:45

## 2025-01-25 RX ADMIN — IPRATROPIUM BROMIDE AND ALBUTEROL SULFATE 1 DOSE: 2.5; .5 SOLUTION RESPIRATORY (INHALATION) at 20:31

## 2025-01-25 RX ADMIN — MIDODRINE HYDROCHLORIDE 2.5 MG: 5 TABLET ORAL at 13:02

## 2025-01-25 RX ADMIN — IPRATROPIUM BROMIDE AND ALBUTEROL SULFATE 1 DOSE: 2.5; .5 SOLUTION RESPIRATORY (INHALATION) at 16:08

## 2025-01-25 ASSESSMENT — PAIN SCALES - GENERAL
PAINLEVEL_OUTOF10: 3
PAINLEVEL_OUTOF10: 7
PAINLEVEL_OUTOF10: 4
PAINLEVEL_OUTOF10: 7
PAINLEVEL_OUTOF10: 4

## 2025-01-25 ASSESSMENT — PAIN DESCRIPTION - ORIENTATION
ORIENTATION: LEFT

## 2025-01-25 ASSESSMENT — PAIN DESCRIPTION - LOCATION
LOCATION: HIP
LOCATION: GROIN;HIP
LOCATION: HIP;OTHER (COMMENT)
LOCATION: GROIN

## 2025-01-25 ASSESSMENT — PAIN - FUNCTIONAL ASSESSMENT
PAIN_FUNCTIONAL_ASSESSMENT: ACTIVITIES ARE NOT PREVENTED

## 2025-01-25 ASSESSMENT — PAIN DESCRIPTION - DESCRIPTORS
DESCRIPTORS: ACHING;DISCOMFORT;SORE
DESCRIPTORS: STABBING
DESCRIPTORS: ACHING;DISCOMFORT;SORE
DESCRIPTORS: ACHING;THROBBING;PRESSURE

## 2025-01-25 ASSESSMENT — PAIN DESCRIPTION - ONSET: ONSET: GRADUAL

## 2025-01-25 ASSESSMENT — PAIN DESCRIPTION - PAIN TYPE: TYPE: ACUTE PAIN

## 2025-01-25 ASSESSMENT — PAIN DESCRIPTION - FREQUENCY: FREQUENCY: INTERMITTENT

## 2025-01-25 NOTE — PLAN OF CARE
Problem: ABCDS Injury Assessment  Goal: Absence of physical injury  1/24/2025 2253 by Zoila Lowry RN  Outcome: Progressing  1/24/2025 1214 by Kecia Koo RN  Outcome: Progressing     Problem: Discharge Planning  Goal: Discharge to home or other facility with appropriate resources  1/24/2025 2253 by Zoila Lowry RN  Outcome: Progressing  1/24/2025 1214 by Kecia Koo RN  Outcome: Progressing     Problem: Chronic Conditions and Co-morbidities  Goal: Patient's chronic conditions and co-morbidity symptoms are monitored and maintained or improved  1/24/2025 2253 by Zoila Lowry RN  Outcome: Progressing  1/24/2025 1214 by Kecia Koo RN  Outcome: Progressing     Problem: Safety - Adult  Goal: Free from fall injury  1/24/2025 2253 by Zoila Lowry RN  Outcome: Progressing  1/24/2025 1214 by Kecia Koo RN  Outcome: Progressing     Problem: Pain  Goal: Verbalizes/displays adequate comfort level or baseline comfort level  1/24/2025 2253 by Zoila Lowry RN  Outcome: Progressing  1/24/2025 1214 by Kecia Koo RN  Outcome: Progressing     Problem: Skin/Tissue Integrity  Goal: Absence of new skin breakdown  Description: 1.  Monitor for areas of redness and/or skin breakdown  2.  Assess vascular access sites hourly  3.  Every 4-6 hours minimum:  Change oxygen saturation probe site  4.  Every 4-6 hours:  If on nasal continuous positive airway pressure, respiratory therapy assess nares and determine need for appliance change or resting period.  1/24/2025 2253 by Zoila Lowry RN  Outcome: Progressing  1/24/2025 1214 by Kecia Koo RN  Outcome: Progressing

## 2025-01-26 LAB
ANION GAP SERPL CALCULATED.3IONS-SCNC: 11 MMOL/L (ref 7–16)
BASOPHILS # BLD: 0.04 K/UL (ref 0–0.2)
BASOPHILS NFR BLD: 0 % (ref 0–2)
BUN SERPL-MCNC: 13 MG/DL (ref 6–23)
CALCIUM SERPL-MCNC: 8.7 MG/DL (ref 8.6–10.2)
CHLORIDE SERPL-SCNC: 98 MMOL/L (ref 98–107)
CO2 SERPL-SCNC: 25 MMOL/L (ref 22–29)
CORTIS SERPL-MCNC: 17.4 UG/DL (ref 2.7–18.4)
CREAT SERPL-MCNC: 0.6 MG/DL (ref 0.7–1.2)
EOSINOPHIL # BLD: 0.09 K/UL (ref 0.05–0.5)
EOSINOPHILS RELATIVE PERCENT: 1 % (ref 0–6)
ERYTHROCYTE [DISTWIDTH] IN BLOOD BY AUTOMATED COUNT: 14.9 % (ref 11.5–15)
GFR, ESTIMATED: >90 ML/MIN/1.73M2
GLUCOSE SERPL-MCNC: 111 MG/DL (ref 74–99)
HCT VFR BLD AUTO: 24.8 % (ref 37–54)
HCT VFR BLD AUTO: 26.1 % (ref 37–54)
HGB BLD-MCNC: 8 G/DL (ref 12.5–16.5)
HGB BLD-MCNC: 8.1 G/DL (ref 12.5–16.5)
IMM GRANULOCYTES # BLD AUTO: 0.27 K/UL (ref 0–0.58)
IMM GRANULOCYTES NFR BLD: 2 % (ref 0–5)
LYMPHOCYTES NFR BLD: 1.29 K/UL (ref 1.5–4)
LYMPHOCYTES RELATIVE PERCENT: 8 % (ref 20–42)
MCH RBC QN AUTO: 30.5 PG (ref 26–35)
MCHC RBC AUTO-ENTMCNC: 31 G/DL (ref 32–34.5)
MCV RBC AUTO: 98.1 FL (ref 80–99.9)
MONOCYTES NFR BLD: 1.02 K/UL (ref 0.1–0.95)
MONOCYTES NFR BLD: 6 % (ref 2–12)
NEUTROPHILS NFR BLD: 84 % (ref 43–80)
NEUTS SEG NFR BLD: 14.18 K/UL (ref 1.8–7.3)
PLATELET # BLD AUTO: 226 K/UL (ref 130–450)
PMV BLD AUTO: 10.8 FL (ref 7–12)
POTASSIUM SERPL-SCNC: 3.9 MMOL/L (ref 3.5–5)
RBC # BLD AUTO: 2.66 M/UL (ref 3.8–5.8)
SODIUM SERPL-SCNC: 134 MMOL/L (ref 132–146)
WBC OTHER # BLD: 16.9 K/UL (ref 4.5–11.5)

## 2025-01-26 PROCEDURE — 99233 SBSQ HOSP IP/OBS HIGH 50: CPT | Performed by: STUDENT IN AN ORGANIZED HEALTH CARE EDUCATION/TRAINING PROGRAM

## 2025-01-26 PROCEDURE — 6360000002 HC RX W HCPCS

## 2025-01-26 PROCEDURE — 94640 AIRWAY INHALATION TREATMENT: CPT

## 2025-01-26 PROCEDURE — 6370000000 HC RX 637 (ALT 250 FOR IP)

## 2025-01-26 PROCEDURE — 2060000000 HC ICU INTERMEDIATE R&B

## 2025-01-26 PROCEDURE — P9016 RBC LEUKOCYTES REDUCED: HCPCS

## 2025-01-26 PROCEDURE — 36430 TRANSFUSION BLD/BLD COMPNT: CPT

## 2025-01-26 PROCEDURE — 2700000000 HC OXYGEN THERAPY PER DAY

## 2025-01-26 PROCEDURE — 2500000003 HC RX 250 WO HCPCS

## 2025-01-26 PROCEDURE — 6370000000 HC RX 637 (ALT 250 FOR IP): Performed by: STUDENT IN AN ORGANIZED HEALTH CARE EDUCATION/TRAINING PROGRAM

## 2025-01-26 PROCEDURE — 6370000000 HC RX 637 (ALT 250 FOR IP): Performed by: INTERNAL MEDICINE

## 2025-01-26 RX ORDER — SODIUM CHLORIDE 9 MG/ML
INJECTION, SOLUTION INTRAVENOUS PRN
Status: DISCONTINUED | OUTPATIENT
Start: 2025-01-26 | End: 2025-01-27 | Stop reason: HOSPADM

## 2025-01-26 RX ORDER — FUROSEMIDE 10 MG/ML
20 INJECTION INTRAMUSCULAR; INTRAVENOUS ONCE
Status: DISCONTINUED | OUTPATIENT
Start: 2025-01-26 | End: 2025-01-27 | Stop reason: HOSPADM

## 2025-01-26 RX ORDER — MIDODRINE HYDROCHLORIDE 5 MG/1
5 TABLET ORAL
Status: DISCONTINUED | OUTPATIENT
Start: 2025-01-26 | End: 2025-01-27 | Stop reason: HOSPADM

## 2025-01-26 RX ADMIN — MIDODRINE HYDROCHLORIDE 2.5 MG: 5 TABLET ORAL at 08:13

## 2025-01-26 RX ADMIN — BUDESONIDE INHALATION 500 MCG: 0.5 SUSPENSION RESPIRATORY (INHALATION) at 09:08

## 2025-01-26 RX ADMIN — BUDESONIDE INHALATION 500 MCG: 0.5 SUSPENSION RESPIRATORY (INHALATION) at 18:24

## 2025-01-26 RX ADMIN — SODIUM CHLORIDE, PRESERVATIVE FREE 10 ML: 5 INJECTION INTRAVENOUS at 21:27

## 2025-01-26 RX ADMIN — PROCHLORPERAZINE MALEATE 10 MG: 10 TABLET ORAL at 05:59

## 2025-01-26 RX ADMIN — MIDODRINE HYDROCHLORIDE 5 MG: 5 TABLET ORAL at 17:17

## 2025-01-26 RX ADMIN — ARFORMOTEROL TARTRATE 15 MCG: 15 SOLUTION RESPIRATORY (INHALATION) at 18:24

## 2025-01-26 RX ADMIN — POTASSIUM CHLORIDE 20 MEQ: 20 TABLET, EXTENDED RELEASE ORAL at 08:13

## 2025-01-26 RX ADMIN — IPRATROPIUM BROMIDE AND ALBUTEROL SULFATE 1 DOSE: 2.5; .5 SOLUTION RESPIRATORY (INHALATION) at 09:08

## 2025-01-26 RX ADMIN — IPRATROPIUM BROMIDE AND ALBUTEROL SULFATE 1 DOSE: 2.5; .5 SOLUTION RESPIRATORY (INHALATION) at 18:24

## 2025-01-26 RX ADMIN — Medication 3 MG: at 23:09

## 2025-01-26 RX ADMIN — SODIUM CHLORIDE, PRESERVATIVE FREE 10 ML: 5 INJECTION INTRAVENOUS at 08:13

## 2025-01-26 RX ADMIN — DIGOXIN 62.5 MCG: 0.12 TABLET ORAL at 08:13

## 2025-01-26 RX ADMIN — IPRATROPIUM BROMIDE AND ALBUTEROL SULFATE 1 DOSE: 2.5; .5 SOLUTION RESPIRATORY (INHALATION) at 16:08

## 2025-01-26 RX ADMIN — MIDODRINE HYDROCHLORIDE 5 MG: 5 TABLET ORAL at 11:00

## 2025-01-26 RX ADMIN — ARFORMOTEROL TARTRATE 15 MCG: 15 SOLUTION RESPIRATORY (INHALATION) at 09:08

## 2025-01-26 RX ADMIN — IPRATROPIUM BROMIDE AND ALBUTEROL SULFATE 1 DOSE: 2.5; .5 SOLUTION RESPIRATORY (INHALATION) at 12:53

## 2025-01-26 ASSESSMENT — PAIN DESCRIPTION - LOCATION
LOCATION: GROIN
LOCATION: GROIN;HIP

## 2025-01-26 ASSESSMENT — PAIN SCALES - GENERAL
PAINLEVEL_OUTOF10: 3
PAINLEVEL_OUTOF10: 0
PAINLEVEL_OUTOF10: 3
PAINLEVEL_OUTOF10: 0
PAINLEVEL_OUTOF10: 4
PAINLEVEL_OUTOF10: 0

## 2025-01-26 ASSESSMENT — PAIN DESCRIPTION - DESCRIPTORS: DESCRIPTORS: DISCOMFORT

## 2025-01-26 ASSESSMENT — PAIN DESCRIPTION - ORIENTATION
ORIENTATION: LEFT
ORIENTATION: LEFT

## 2025-01-26 NOTE — CONSENT
Informed Consent for Blood Component Transfusion Note    I have discussed with the patient the rationale for blood component transfusion; its benefits in treating or preventing fatigue, organ damage, or death; and its risk which includes mild transfusion reactions, rare risk of blood borne infection, or more serious but rare reactions. I have discussed the alternatives to transfusion, including the risk and consequences of not receiving transfusion. The patient had an opportunity to ask questions and had agreed to proceed with transfusion of blood components.    Electronically signed by MIGUE Lazo NP on 1/26/25 at 10:29 AM EST

## 2025-01-26 NOTE — PLAN OF CARE
Problem: ABCDS Injury Assessment  Goal: Absence of physical injury  1/26/2025 0921 by Meera Espinoza, RN  Outcome: Progressing  1/25/2025 2217 by Carmen Argueta, RN  Outcome: Progressing

## 2025-01-26 NOTE — PLAN OF CARE
Problem: ABCDS Injury Assessment  Goal: Absence of physical injury  1/25/2025 2217 by Carmen Argueta RN  Outcome: Progressing  1/25/2025 1613 by Theodora Johnson RN  Outcome: Progressing     Problem: Discharge Planning  Goal: Discharge to home or other facility with appropriate resources  1/25/2025 2217 by Carmen Argueta RN  Outcome: Progressing  1/25/2025 1613 by Theodora Johnson RN  Outcome: Progressing     Problem: Chronic Conditions and Co-morbidities  Goal: Patient's chronic conditions and co-morbidity symptoms are monitored and maintained or improved  1/25/2025 2217 by Carmen Argueta RN  Outcome: Progressing  Flowsheets (Taken 1/25/2025 2130)  Care Plan - Patient's Chronic Conditions and Co-Morbidity Symptoms are Monitored and Maintained or Improved: Monitor and assess patient's chronic conditions and comorbid symptoms for stability, deterioration, or improvement  1/25/2025 1613 by Theodora Johnson RN  Outcome: Progressing     Problem: Safety - Adult  Goal: Free from fall injury  1/25/2025 2217 by Carmen Argueta RN  Outcome: Progressing  1/25/2025 1613 by Theodora Johnson RN  Outcome: Progressing     Problem: Pain  Goal: Verbalizes/displays adequate comfort level or baseline comfort level  1/25/2025 2217 by Carmen Argueta RN  Outcome: Progressing  1/25/2025 1613 by Theodora Johnson RN  Outcome: Progressing     Problem: Skin/Tissue Integrity  Goal: Absence of new skin breakdown  Description: 1.  Monitor for areas of redness and/or skin breakdown  2.  Assess vascular access sites hourly  3.  Every 4-6 hours minimum:  Change oxygen saturation probe site  4.  Every 4-6 hours:  If on nasal continuous positive airway pressure, respiratory therapy assess nares and determine need for appliance change or resting period.  1/25/2025 2217 by Carmen Argueta RN  Outcome: Progressing  1/25/2025 1613 by Theodora Johnson RN  Outcome: Progressing

## 2025-01-27 VITALS
RESPIRATION RATE: 26 BRPM | HEIGHT: 73 IN | OXYGEN SATURATION: 89 % | TEMPERATURE: 98.2 F | WEIGHT: 179.45 LBS | BODY MASS INDEX: 23.78 KG/M2 | SYSTOLIC BLOOD PRESSURE: 94 MMHG | HEART RATE: 116 BPM | DIASTOLIC BLOOD PRESSURE: 55 MMHG

## 2025-01-27 LAB
ABO/RH: NORMAL
ANTIBODY SCREEN: NEGATIVE
ARM BAND NUMBER: NORMAL
BLOOD BANK BLOOD PRODUCT EXPIRATION DATE: NORMAL
BLOOD BANK DISPENSE STATUS: NORMAL
BLOOD BANK ISBT PRODUCT BLOOD TYPE: 5100
BLOOD BANK PRODUCT CODE: NORMAL
BLOOD BANK SAMPLE EXPIRATION: NORMAL
BLOOD BANK UNIT TYPE AND RH: NORMAL
BPU ID: NORMAL
COMPONENT: NORMAL
CROSSMATCH RESULT: NORMAL
TRANSFUSION STATUS: NORMAL
UNIT DIVISION: 0
UNIT ISSUE DATE/TIME: NORMAL

## 2025-01-27 PROCEDURE — 6370000000 HC RX 637 (ALT 250 FOR IP)

## 2025-01-27 PROCEDURE — 94640 AIRWAY INHALATION TREATMENT: CPT

## 2025-01-27 PROCEDURE — 6360000002 HC RX W HCPCS

## 2025-01-27 PROCEDURE — 2700000000 HC OXYGEN THERAPY PER DAY

## 2025-01-27 PROCEDURE — 2500000003 HC RX 250 WO HCPCS

## 2025-01-27 PROCEDURE — 6370000000 HC RX 637 (ALT 250 FOR IP): Performed by: STUDENT IN AN ORGANIZED HEALTH CARE EDUCATION/TRAINING PROGRAM

## 2025-01-27 PROCEDURE — 6370000000 HC RX 637 (ALT 250 FOR IP): Performed by: INTERNAL MEDICINE

## 2025-01-27 PROCEDURE — 99239 HOSP IP/OBS DSCHRG MGMT >30: CPT | Performed by: STUDENT IN AN ORGANIZED HEALTH CARE EDUCATION/TRAINING PROGRAM

## 2025-01-27 RX ORDER — DIGOXIN 0.06 MG/1
62.5 TABLET ORAL DAILY
Qty: 30 TABLET | Refills: 3 | Status: SHIPPED | OUTPATIENT
Start: 2025-01-28

## 2025-01-27 RX ORDER — MIDODRINE HYDROCHLORIDE 5 MG/1
5 TABLET ORAL
Qty: 90 TABLET | Refills: 3 | Status: SHIPPED | OUTPATIENT
Start: 2025-01-27

## 2025-01-27 RX ADMIN — IPRATROPIUM BROMIDE AND ALBUTEROL SULFATE 1 DOSE: 2.5; .5 SOLUTION RESPIRATORY (INHALATION) at 16:25

## 2025-01-27 RX ADMIN — MIDODRINE HYDROCHLORIDE 5 MG: 5 TABLET ORAL at 08:05

## 2025-01-27 RX ADMIN — DIGOXIN 62.5 MCG: 0.12 TABLET ORAL at 08:05

## 2025-01-27 RX ADMIN — IPRATROPIUM BROMIDE AND ALBUTEROL SULFATE 1 DOSE: 2.5; .5 SOLUTION RESPIRATORY (INHALATION) at 09:18

## 2025-01-27 RX ADMIN — SODIUM CHLORIDE, PRESERVATIVE FREE 10 ML: 5 INJECTION INTRAVENOUS at 08:05

## 2025-01-27 RX ADMIN — IPRATROPIUM BROMIDE AND ALBUTEROL SULFATE 1 DOSE: 2.5; .5 SOLUTION RESPIRATORY (INHALATION) at 12:30

## 2025-01-27 RX ADMIN — POTASSIUM CHLORIDE 20 MEQ: 20 TABLET, EXTENDED RELEASE ORAL at 08:04

## 2025-01-27 RX ADMIN — ACETAMINOPHEN 650 MG: 325 TABLET ORAL at 03:32

## 2025-01-27 RX ADMIN — ARFORMOTEROL TARTRATE 15 MCG: 15 SOLUTION RESPIRATORY (INHALATION) at 09:18

## 2025-01-27 RX ADMIN — BUDESONIDE INHALATION 500 MCG: 0.5 SUSPENSION RESPIRATORY (INHALATION) at 09:18

## 2025-01-27 RX ADMIN — MIDODRINE HYDROCHLORIDE 5 MG: 5 TABLET ORAL at 12:37

## 2025-01-27 ASSESSMENT — PAIN DESCRIPTION - ORIENTATION: ORIENTATION: RIGHT;LEFT;MID

## 2025-01-27 ASSESSMENT — PAIN SCALES - GENERAL
PAINLEVEL_OUTOF10: 6
PAINLEVEL_OUTOF10: 0

## 2025-01-27 ASSESSMENT — PAIN DESCRIPTION - DESCRIPTORS: DESCRIPTORS: DISCOMFORT;SORE;TENDER

## 2025-01-27 ASSESSMENT — PAIN DESCRIPTION - LOCATION: LOCATION: GROIN;LEG

## 2025-01-27 NOTE — PLAN OF CARE
Problem: ABCDS Injury Assessment  Goal: Absence of physical injury  1/26/2025 2204 by Carmen Argueta, RN  Outcome: Progressing  1/26/2025 0921 by Meera Espinoza RN  Outcome: Progressing     Problem: Discharge Planning  Goal: Discharge to home or other facility with appropriate resources  Outcome: Progressing     Problem: Chronic Conditions and Co-morbidities  Goal: Patient's chronic conditions and co-morbidity symptoms are monitored and maintained or improved  Outcome: Progressing     Problem: Safety - Adult  Goal: Free from fall injury  Outcome: Progressing     Problem: Pain  Goal: Verbalizes/displays adequate comfort level or baseline comfort level  Outcome: Progressing     Problem: Skin/Tissue Integrity  Goal: Absence of new skin breakdown  Description: 1.  Monitor for areas of redness and/or skin breakdown  2.  Assess vascular access sites hourly  3.  Every 4-6 hours minimum:  Change oxygen saturation probe site  4.  Every 4-6 hours:  If on nasal continuous positive airway pressure, respiratory therapy assess nares and determine need for appliance change or resting period.  Outcome: Progressing

## 2025-01-27 NOTE — PROGRESS NOTES
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 Telemetry shows atrial fibrillation with heart rate in the 110s. He states he feels his breathing has worsened. No cough or wheezing. Significant dyspnea on exertion. He seems breathless with conversation today.       Vitals:  VITALS:  BP (!) 115/91   Pulse 98   Temp 97.7 °F (36.5 °C) (Oral)   Resp 20   Ht 1.854 m (6' 1\")   Wt 82.3 kg (181 lb 7 oz)   SpO2 93%   BMI 23.94 kg/m²     24HR INTAKE/OUTPUT:      Intake/Output Summary (Last 24 hours) at 2025 1140  Last data filed at 2025 0003  Gross per 24 hour   Intake --   Output 750 ml   Net -750 ml       24HR PULSE OXIMETRY RANGE:    SpO2  Av.9 %  Min: 84 %  Max: 93 %    Medications:  IV:   sodium chloride         Scheduled Meds:   potassium chloride  20 mEq Oral Daily    bumetanide  1 mg IntraVENous BID    budesonide  0.5 mg Nebulization BID RT    arformoterol tartrate  15 mcg Nebulization BID RT    cefepime  2,000 mg IntraVENous Q12H    sodium chloride flush  5-40 mL IntraVENous 
                 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  Saturations of 87% are just fine.  Higher will cause shunting to the apices.      Vitals:  VITALS:  /81   Pulse (!) 107   Temp 97.7 °F (36.5 °C) (Oral)   Resp 22   Ht 1.854 m (6' 1\")   Wt 83 kg (182 lb 15.7 oz)   SpO2 93%   BMI 24.14 kg/m²     24HR INTAKE/OUTPUT:      Intake/Output Summary (Last 24 hours) at 2025 1736  Last data filed at 2025 1136  Gross per 24 hour   Intake 240 ml   Output 4300 ml   Net -4060 ml       24HR PULSE OXIMETRY RANGE:    SpO2  Av.9 %  Min: 80 %  Max: 94 %    Medications:  IV:   sodium chloride         Scheduled Meds:   enoxaparin  1 mg/kg SubCUTAneous BID    potassium chloride  20 mEq Oral Daily    bumetanide  1 mg IntraVENous BID    budesonide  0.5 mg Nebulization BID RT    arformoterol tartrate  15 mcg Nebulization BID RT    cefepime  2,000 mg IntraVENous Q12H    sodium chloride flush  5-40 mL IntraVENous 2 times per day    ipratropium 0.5 mg-albuterol 2.5 mg  1 Dose Inhalation Q4H WA RT    predniSONE  40 
         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)    Acute on chronic heart failure with preserved ejection fraction (HFpEF) (HCC)    Chronic respiratory failure with hypoxia    Chronic pain syndrome    Lumbar degenerative disc disease    Chronic low back pain    Immunocompromised (HCC)    Iron deficiency    Pulmonary hypertension (HCC)    Chronic diastolic heart failure (HCC)    Acute on chronic respiratory failure with hypoxia    IgG deficiency (HCC)    Acute on chronic hypoxic respiratory failure    COPD with acute exacerbation (HCC)    Palliative care encounter    Goals of care, counseling/discussion       Subjective:  Continues on the Airvo.  Trying to wean FiO2 as tolerated.  Some hemoptysis this p.m. which may become draining down from the nose into his chest.  This will need to be observed      Vitals:  VITALS:  BP (!) 84/57   Pulse (!) 108   Temp 98.1 °F (36.7 °C) (Oral)   Resp 23   Ht 1.854 m (6' 1\")   Wt 80.7 kg (178 lb)   SpO2 90%   BMI 23.48 kg/m²     24HR INTAKE/OUTPUT:      Intake/Output Summary (Last 24 hours) at 2025 1655  Last data filed at 2025 0525  Gross per 24 hour   Intake --   Output 500 ml   Net -500 ml       24HR PULSE OXIMETRY RANGE:    SpO2  Av.6 %  Min: 78 %  Max: 100 %    Medications:  IV:   sodium chloride         Scheduled Meds:   bisacodyl  15 mg Oral Daily    digoxin  62.5 mcg Oral Daily    [Held by provider] spironolactone  12.5 mg Oral Daily    [Held by provider] atenolol  25 mg Oral Daily    [Held by provider] enoxaparin  1 mg/kg SubCUTAneous BID    potassium chloride  20 mEq Oral Daily    
         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)    Acute on chronic heart failure with preserved ejection fraction (HFpEF) (HCC)    Chronic respiratory failure with hypoxia    Chronic pain syndrome    Lumbar degenerative disc disease    Chronic low back pain    Immunocompromised (HCC)    Iron deficiency    Pulmonary hypertension (HCC)    Chronic diastolic heart failure (HCC)    Acute on chronic respiratory failure with hypoxia    IgG deficiency (HCC)    Acute on chronic hypoxic respiratory failure    COPD with acute exacerbation (HCC)    Palliative care encounter    Goals of care, counseling/discussion       Subjective:  Patient seen on Airvo 45 L, 60%, 93%. He is now using non re breather 15 David top of Airvo due to being dyspneic. He feels it helps him relax. Patient is significantly dyspneic with quick desaturation while turning in the bed or laying head back per nursing. He does have ongoing leg pain with hematoma. No longer with productive cough.       Vitals:  VITALS:  /77   Pulse (!) 115   Temp 98.2 °F (36.8 °C) (Oral)   Resp 24   Ht 1.854 m (6' 1\")   Wt 81 kg (178 lb 9.2 oz)   SpO2 (!) 80%   BMI 23.56 kg/m²     24HR INTAKE/OUTPUT:      Intake/Output Summary (Last 24 hours) at 2025 1030  Last data filed at 2025 0718  Gross per 24 hour   Intake 190 ml   Output 625 ml   Net -435 ml       24HR PULSE OXIMETRY RANGE:    SpO2  Av.4 %  Min: 80 %  Max: 97 %    Medications:  IV:   sodium chloride      sodium chloride         Scheduled Meds:   midodrine  5 mg Oral TID WC    furosemide  20 mg IntraVENous Once    bisacodyl  15 
         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)    Acute on chronic heart failure with preserved ejection fraction (HFpEF) (HCC)    Chronic respiratory failure with hypoxia    Chronic pain syndrome    Lumbar degenerative disc disease    Chronic low back pain    Immunocompromised (HCC)    Iron deficiency    Pulmonary hypertension (HCC)    Chronic diastolic heart failure (HCC)    Acute on chronic respiratory failure with hypoxia    IgG deficiency (HCC)    Acute on chronic hypoxic respiratory failure    COPD with acute exacerbation (HCC)    Palliative care encounter    Goals of care, counseling/discussion       Subjective:  Patient seen on Airvo 45 L. He is dyspneic with conversation. Brown secretions. He does have no wheezing or chest tightness. His main complaint is his left hip/groin area with pain and now ecchymosis. He has had pain in this area since he pulled himself up in his bed three days and ago and felt a \"pop\" in his groin. Still unable to lay flat and complete CT of the chest.       Vitals:  VITALS:  BP (!) 156/119   Pulse 100   Temp 97.9 °F (36.6 °C) (Axillary)   Resp 22   Ht 1.854 m (6' 1\")   Wt 80.7 kg (178 lb)   SpO2 91%   BMI 23.48 kg/m²     24HR INTAKE/OUTPUT:      Intake/Output Summary (Last 24 hours) at 2025 1129  Last data filed at 2025 1009  Gross per 24 hour   Intake 250 ml   Output 1675 ml   Net -1425 ml       24HR PULSE OXIMETRY RANGE:    SpO2  Av.4 %  Min: 90 %  Max: 100 %    Medications:  IV:   sodium chloride         Scheduled Meds:   digoxin  62.5 mcg Oral Daily    spironolactone  12.5 mg Oral Daily    
         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)    Acute on chronic heart failure with preserved ejection fraction (HFpEF) (HCC)    Chronic respiratory failure with hypoxia    Chronic pain syndrome    Lumbar degenerative disc disease    Chronic low back pain    Immunocompromised (HCC)    Iron deficiency    Pulmonary hypertension (HCC)    Chronic diastolic heart failure (HCC)    Acute on chronic respiratory failure with hypoxia    IgG deficiency (HCC)    Acute on chronic hypoxic respiratory failure    COPD with acute exacerbation (HCC)    Palliative care encounter    Goals of care, counseling/discussion       Subjective:  Patient seen on Airvo 45 L. He was down to 12 L high flow oxygen. He got up to use bedside commode and this is when his oxygen began to drop again after sitting a while. Complains of constipation. Refused CT due to being unable to lay flat. Admits to severe claustrophobia with NIV in the past.       Vitals:  VITALS:  BP (!) 184/151   Pulse 85   Temp 97.7 °F (36.5 °C) (Oral)   Resp 25   Ht 1.854 m (6' 1\")   Wt 80.7 kg (178 lb)   SpO2 94%   BMI 23.48 kg/m²     24HR INTAKE/OUTPUT:      Intake/Output Summary (Last 24 hours) at 2025 1200  Last data filed at 2025 0835  Gross per 24 hour   Intake 10 ml   Output 900 ml   Net -890 ml       24HR PULSE OXIMETRY RANGE:    SpO2  Av.5 %  Min: 80 %  Max: 100 %    Medications:  IV:   sodium chloride         Scheduled Meds:   digoxin  62.5 mcg Oral Daily    spironolactone  12.5 mg Oral Daily    atenolol  25 mg Oral Daily    enoxaparin  1 mg/kg SubCUTAneous BID    potassium 
         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)    Acute on chronic heart failure with preserved ejection fraction (HFpEF) (HCC)    Chronic respiratory failure with hypoxia    Chronic pain syndrome    Lumbar degenerative disc disease    Chronic low back pain    Immunocompromised (HCC)    Iron deficiency    Pulmonary hypertension (HCC)    Chronic diastolic heart failure (HCC)    Acute on chronic respiratory failure with hypoxia    IgG deficiency (HCC)    Acute on chronic hypoxic respiratory failure    COPD with acute exacerbation (HCC)    Palliative care encounter    Goals of care, counseling/discussion       Subjective:  Patient seen on Airvo 45 L. He was up to bedside commode yesterday oxygen saturations began to decrease quickly and he states he sat there for an hour with weakness. Patient was transferred back to bed with mobility machine. Patient admits to dyspnea on exertion. He did have a productive cough yesterday with yellow/brown secretions. Denies wheezing. Also, patient admits to leg pain unilateral due to a pulled muscle.       Vitals:  VITALS:  BP (!) 105/58   Pulse 94   Temp 98.1 °F (36.7 °C) (Axillary)   Resp 18   Ht 1.854 m (6' 1\")   Wt 80.7 kg (178 lb)   SpO2 92%   BMI 23.48 kg/m²     24HR INTAKE/OUTPUT:      Intake/Output Summary (Last 24 hours) at 2025 1148  Last data filed at 2025 0038  Gross per 24 hour   Intake --   Output 750 ml   Net -750 ml       24HR PULSE OXIMETRY RANGE:    SpO2  Av.2 %  Min: 82 %  Max: 100 %    Medications:  IV:   sodium chloride         Scheduled Meds:   digoxin  62.5 mcg Oral Daily 
       [facility] Hospitalist Progress Note    Admitting Date and Time: 1/13/2025 11:38 AM  Admit Dx: COPD exacerbation (HCC) [J44.1]  Acute on chronic congestive heart failure, unspecified heart failure type (HCC) [I50.9]  Acute on chronic hypoxic respiratory failure [J96.21]    Subjective:  Patient is being followed for COPD exacerbation (HCC) [J44.1]  Acute on chronic congestive heart failure, unspecified heart failure type (HCC) [I50.9]  Acute on chronic hypoxic respiratory failure [J96.21]   Pt feels dyspnea, productive cough.  Per RN: remains on Airvo    ROS: denies fever, chills, cp, sob, n/v, HA unless stated above.      midodrine  2.5 mg Oral TID WC    bisacodyl  15 mg Oral Daily    digoxin  62.5 mcg Oral Daily    [Held by provider] spironolactone  12.5 mg Oral Daily    [Held by provider] atenolol  25 mg Oral Daily    [Held by provider] enoxaparin  1 mg/kg SubCUTAneous BID    potassium chloride  20 mEq Oral Daily    [Held by provider] bumetanide  1 mg IntraVENous BID    budesonide  0.5 mg Nebulization BID RT    arformoterol tartrate  15 mcg Nebulization BID RT    sodium chloride flush  5-40 mL IntraVENous 2 times per day    ipratropium 0.5 mg-albuterol 2.5 mg  1 Dose Inhalation Q4H WA RT     cyclobenzaprine, 5 mg, TID PRN  oxyCODONE-acetaminophen, 1 tablet, Q12H PRN   And  oxyCODONE, 2.5 mg, Q12H PRN  melatonin, 3 mg, Nightly PRN  sulfur hexafluoride microspheres, 2 mL, ONCE PRN  sodium chloride flush, 5-40 mL, PRN  sodium chloride, , PRN  polyethylene glycol, 17 g, Daily PRN  acetaminophen, 650 mg, Q6H PRN   Or  acetaminophen, 650 mg, Q6H PRN  albuterol, 2.5 mg, Q2H PRN  prochlorperazine, 10 mg, Q8H PRN   Or  prochlorperazine, 10 mg, Q6H PRN         Objective:    BP 98/60   Pulse (!) 107   Temp 98.8 °F (37.1 °C) (Axillary)   Resp 24   Ht 1.854 m (6' 1\")   Wt 84 kg (185 lb 3 oz)   SpO2 97%   BMI 24.43 kg/m²     General Appearance: alert and oriented to person, place and time and in NAD, ill 
       [facility] Hospitalist Progress Note    Admitting Date and Time: 1/13/2025 11:38 AM  Admit Dx: COPD exacerbation (HCC) [J44.1]  Acute on chronic congestive heart failure, unspecified heart failure type (HCC) [I50.9]  Acute on chronic hypoxic respiratory failure [J96.21]    Subjective:  Patient is being followed for COPD exacerbation (HCC) [J44.1]  Acute on chronic congestive heart failure, unspecified heart failure type (HCC) [I50.9]  Acute on chronic hypoxic respiratory failure [J96.21]   Pt feels okay  Per RN: no additional concerns    ROS: denies fever, chills, cp, sob, n/v, HA unless otherwise noted above     potassium chloride  20 mEq Oral Daily    bumetanide  1 mg IntraVENous BID    budesonide  0.5 mg Nebulization BID RT    arformoterol tartrate  15 mcg Nebulization BID RT    cefepime  2,000 mg IntraVENous Q12H    sodium chloride flush  5-40 mL IntraVENous 2 times per day    enoxaparin  40 mg SubCUTAneous Daily    ipratropium 0.5 mg-albuterol 2.5 mg  1 Dose Inhalation Q4H WA RT    predniSONE  40 mg Oral Daily     oxyCODONE-acetaminophen, 1 tablet, Q12H PRN   And  oxyCODONE, 2.5 mg, Q12H PRN  sodium chloride flush, 5-40 mL, PRN  sodium chloride, , PRN  polyethylene glycol, 17 g, Daily PRN  acetaminophen, 650 mg, Q6H PRN   Or  acetaminophen, 650 mg, Q6H PRN  albuterol, 2.5 mg, Q2H PRN  prochlorperazine, 10 mg, Q8H PRN   Or  prochlorperazine, 10 mg, Q6H PRN         Objective:  /82   Pulse 89   Temp 98 °F (36.7 °C) (Oral)   Resp 20   Ht 1.854 m (6' 1\")   Wt 82.3 kg (181 lb 7 oz)   SpO2 91%   BMI 23.94 kg/m²     General Appearance: alert and oriented to person, place and time and in NAD, pulmonary cachexia, sitting on side of transport stretcher  Skin: warm and dry  Head: normocephalic and atraumatic  Eyes: PERRL, EOMI, conjunctivae normal  Neck: neck supple, trachea midline   Pulmonary/Chest: diminished diffusely, tripod positioning, 15L HFNC, hypoxia to 70s on monitor  Cardiovascular: RRR, 
       [facility] Hospitalist Progress Note    Admitting Date and Time: 1/13/2025 11:38 AM  Admit Dx: COPD exacerbation (HCC) [J44.1]  Acute on chronic congestive heart failure, unspecified heart failure type (HCC) [I50.9]  Acute on chronic hypoxic respiratory failure [J96.21]    Subjective:  Patient is being followed for COPD exacerbation (HCC) [J44.1]  Acute on chronic congestive heart failure, unspecified heart failure type (HCC) [I50.9]  Acute on chronic hypoxic respiratory failure [J96.21]   Pt feels okay  Per RN: no additional concerns    ROS: denies fever, chills, cp, sob, n/v, HA unless otherwise noted above     digoxin  62.5 mcg Oral Daily    spironolactone  12.5 mg Oral Daily    atenolol  25 mg Oral Daily    enoxaparin  1 mg/kg SubCUTAneous BID    potassium chloride  20 mEq Oral Daily    bumetanide  1 mg IntraVENous BID    budesonide  0.5 mg Nebulization BID RT    arformoterol tartrate  15 mcg Nebulization BID RT    sodium chloride flush  5-40 mL IntraVENous 2 times per day    ipratropium 0.5 mg-albuterol 2.5 mg  1 Dose Inhalation Q4H WA RT     melatonin, 3 mg, Nightly PRN  sulfur hexafluoride microspheres, 2 mL, ONCE PRN  oxyCODONE-acetaminophen, 1 tablet, Q12H PRN   And  oxyCODONE, 2.5 mg, Q12H PRN  sodium chloride flush, 5-40 mL, PRN  sodium chloride, , PRN  polyethylene glycol, 17 g, Daily PRN  acetaminophen, 650 mg, Q6H PRN   Or  acetaminophen, 650 mg, Q6H PRN  albuterol, 2.5 mg, Q2H PRN  prochlorperazine, 10 mg, Q8H PRN   Or  prochlorperazine, 10 mg, Q6H PRN         Objective:  BP (!) 96/59   Pulse 97   Temp 98.6 °F (37 °C) (Oral)   Resp 20   Ht 1.854 m (6' 1\")   Wt 80.7 kg (178 lb)   SpO2 93%   BMI 23.48 kg/m²     General Appearance: alert and oriented to person, place and time and in NAD, pulmonary cachexia, laying in bed  Skin: warm and dry  Head: normocephalic and atraumatic  Eyes: PERRL, EOMI, conjunctivae normal  Neck: neck supple, trachea midline   Pulmonary/Chest: diffusely 
       [facility] Hospitalist Progress Note    Admitting Date and Time: 1/13/2025 11:38 AM  Admit Dx: COPD exacerbation (HCC) [J44.1]  Acute on chronic congestive heart failure, unspecified heart failure type (HCC) [I50.9]  Acute on chronic hypoxic respiratory failure [J96.21]    Subjective:  Patient is being followed for COPD exacerbation (HCC) [J44.1]  Acute on chronic congestive heart failure, unspecified heart failure type (HCC) [I50.9]  Acute on chronic hypoxic respiratory failure [J96.21]   Pt feels okay  Per RN: no additional concerns    ROS: denies fever, chills, cp, sob, n/v, HA unless otherwise noted above     digoxin  62.5 mcg Oral Daily    spironolactone  12.5 mg Oral Daily    atenolol  25 mg Oral Daily    enoxaparin  1 mg/kg SubCUTAneous BID    potassium chloride  20 mEq Oral Daily    bumetanide  1 mg IntraVENous BID    budesonide  0.5 mg Nebulization BID RT    arformoterol tartrate  15 mcg Nebulization BID RT    sodium chloride flush  5-40 mL IntraVENous 2 times per day    ipratropium 0.5 mg-albuterol 2.5 mg  1 Dose Inhalation Q4H WA RT     sulfur hexafluoride microspheres, 2 mL, ONCE PRN  oxyCODONE-acetaminophen, 1 tablet, Q12H PRN   And  oxyCODONE, 2.5 mg, Q12H PRN  sodium chloride flush, 5-40 mL, PRN  sodium chloride, , PRN  polyethylene glycol, 17 g, Daily PRN  acetaminophen, 650 mg, Q6H PRN   Or  acetaminophen, 650 mg, Q6H PRN  albuterol, 2.5 mg, Q2H PRN  prochlorperazine, 10 mg, Q8H PRN   Or  prochlorperazine, 10 mg, Q6H PRN         Objective:  BP 90/77   Pulse 79   Temp 97.3 °F (36.3 °C) (Oral)   Resp 22   Ht 1.854 m (6' 1\")   Wt 80.7 kg (178 lb)   SpO2 (!) 87%   BMI 23.48 kg/m²     General Appearance: alert and oriented to person, place and time and in NAD, pulmonary cachexia, sitting in bed eating lunch  Skin: warm and dry  Head: normocephalic and atraumatic  Eyes: PERRL, EOMI, conjunctivae normal  Neck: neck supple, trachea midline   Pulmonary/Chest: diminished diffusely, clear lung 
       [facility] Hospitalist Progress Note    Admitting Date and Time: 1/13/2025 11:38 AM  Admit Dx: COPD exacerbation (HCC) [J44.1]  Acute on chronic congestive heart failure, unspecified heart failure type (HCC) [I50.9]  Acute on chronic hypoxic respiratory failure [J96.21]    Subjective:  Patient is being followed for COPD exacerbation (HCC) [J44.1]  Acute on chronic congestive heart failure, unspecified heart failure type (HCC) [I50.9]  Acute on chronic hypoxic respiratory failure [J96.21]   Pt feels okay  Per RN: no additional concerns    ROS: denies fever, chills, cp, sob, n/v, HA unless otherwise noted above     spironolactone  12.5 mg Oral Daily    atenolol  25 mg Oral Daily    enoxaparin  1 mg/kg SubCUTAneous BID    potassium chloride  20 mEq Oral Daily    bumetanide  1 mg IntraVENous BID    budesonide  0.5 mg Nebulization BID RT    arformoterol tartrate  15 mcg Nebulization BID RT    cefepime  2,000 mg IntraVENous Q12H    sodium chloride flush  5-40 mL IntraVENous 2 times per day    ipratropium 0.5 mg-albuterol 2.5 mg  1 Dose Inhalation Q4H WA RT     sulfur hexafluoride microspheres, 2 mL, ONCE PRN  oxyCODONE-acetaminophen, 1 tablet, Q12H PRN   And  oxyCODONE, 2.5 mg, Q12H PRN  sodium chloride flush, 5-40 mL, PRN  sodium chloride, , PRN  polyethylene glycol, 17 g, Daily PRN  acetaminophen, 650 mg, Q6H PRN   Or  acetaminophen, 650 mg, Q6H PRN  albuterol, 2.5 mg, Q2H PRN  prochlorperazine, 10 mg, Q8H PRN   Or  prochlorperazine, 10 mg, Q6H PRN         Objective:  /69   Pulse 93   Temp 97.7 °F (36.5 °C) (Oral)   Resp 18   Ht 1.854 m (6' 1\")   Wt 80.6 kg (177 lb 11.1 oz)   SpO2 92%   BMI 23.44 kg/m²     General Appearance: alert and oriented to person, place and time and in NAD, pulmonary cachexia, sitting in bed talking with wife at bedside  Skin: warm and dry  Head: normocephalic and atraumatic  Eyes: PERRL, EOMI, conjunctivae normal  Neck: neck supple, trachea midline   Pulmonary/Chest: 
       [facility] Hospitalist Progress Note    Admitting Date and Time: 1/13/2025 11:38 AM  Admit Dx: COPD exacerbation (HCC) [J44.1]  Acute on chronic congestive heart failure, unspecified heart failure type (HCC) [I50.9]  Acute on chronic hypoxic respiratory failure [J96.21]    Subjective:  Patient is being followed for COPD exacerbation (HCC) [J44.1]  Acute on chronic congestive heart failure, unspecified heart failure type (HCC) [I50.9]  Acute on chronic hypoxic respiratory failure [J96.21]   Pt feels okay  Per RN: no additional concerns    ROS: denies fever, chills, cp, sob, n/v, HA unless otherwise noted above     enoxaparin  1 mg/kg SubCUTAneous BID    potassium chloride  20 mEq Oral Daily    bumetanide  1 mg IntraVENous BID    budesonide  0.5 mg Nebulization BID RT    arformoterol tartrate  15 mcg Nebulization BID RT    cefepime  2,000 mg IntraVENous Q12H    sodium chloride flush  5-40 mL IntraVENous 2 times per day    ipratropium 0.5 mg-albuterol 2.5 mg  1 Dose Inhalation Q4H WA RT    predniSONE  40 mg Oral Daily     oxyCODONE-acetaminophen, 1 tablet, Q12H PRN   And  oxyCODONE, 2.5 mg, Q12H PRN  sodium chloride flush, 5-40 mL, PRN  sodium chloride, , PRN  polyethylene glycol, 17 g, Daily PRN  acetaminophen, 650 mg, Q6H PRN   Or  acetaminophen, 650 mg, Q6H PRN  albuterol, 2.5 mg, Q2H PRN  prochlorperazine, 10 mg, Q8H PRN   Or  prochlorperazine, 10 mg, Q6H PRN         Objective:  /81   Pulse (!) 107   Temp 97.7 °F (36.5 °C) (Oral)   Resp 22   Ht 1.854 m (6' 1\")   Wt 83 kg (182 lb 15.7 oz)   SpO2 93%   BMI 24.14 kg/m²     General Appearance: alert and oriented to person, place and time and in NAD, pulmonary cachexia, sitting in bed  Skin: warm and dry  Head: normocephalic and atraumatic  Eyes: PERRL, EOMI, conjunctivae normal  Neck: neck supple, trachea midline   Pulmonary/Chest: diminished diffusely, clear lung sounds, 13L HFNC  Cardiovascular: RRR, no murmurs  Abdomen: soft, non-tender, 
       Nutrition Note     Reason for Visit:   Length of Stay    Nutrition Assessment:  Pt w/ COPD exacerbation; acute on chronic hypoxic resp failure. Hx COPD, HF. Pt consuming >75% of meals. Continue current diet and monitor.     Current Nutrition Therapies:    ADULT DIET; Regular; No Added Salt (3-4 gm)    Anthropometrics:   Current Height: 185.4 cm (6' 1\")  Current Weight - Scale: 80.7 kg (178 lb)      Monitoring and Evaluation:  No nutrition diagnosis. Patient will be monitored per nutrition standards of care.     Consult Dietitian if nutrition intervention essential to patient care is needed.     Discharge Planning:  No needs    KESHIA MILNER MPH, RD, LD  [phone]    
       [facility] Hospitalist Progress Note    Admitting Date and Time: 1/13/2025 11:38 AM  Admit Dx: COPD exacerbation (HCC) [J44.1]  Acute on chronic congestive heart failure, unspecified heart failure type (HCC) [I50.9]  Acute on chronic hypoxic respiratory failure [J96.21]    Subjective:  Patient is being followed for COPD exacerbation (HCC) [J44.1]  Acute on chronic congestive heart failure, unspecified heart failure type (HCC) [I50.9]  Acute on chronic hypoxic respiratory failure [J96.21]   Pt feels okay  Per RN: no additional concerns    ROS: denies fever, chills, cp, sob, n/v, HA unless otherwise noted above     digoxin  62.5 mcg Oral Daily    spironolactone  12.5 mg Oral Daily    atenolol  25 mg Oral Daily    [Held by provider] enoxaparin  1 mg/kg SubCUTAneous BID    potassium chloride  20 mEq Oral Daily    bumetanide  1 mg IntraVENous BID    budesonide  0.5 mg Nebulization BID RT    arformoterol tartrate  15 mcg Nebulization BID RT    sodium chloride flush  5-40 mL IntraVENous 2 times per day    ipratropium 0.5 mg-albuterol 2.5 mg  1 Dose Inhalation Q4H WA RT     oxyCODONE-acetaminophen, 1 tablet, Q12H PRN   And  oxyCODONE, 2.5 mg, Q12H PRN  cyclobenzaprine, 5 mg, BID PRN  melatonin, 3 mg, Nightly PRN  sulfur hexafluoride microspheres, 2 mL, ONCE PRN  sodium chloride flush, 5-40 mL, PRN  sodium chloride, , PRN  polyethylene glycol, 17 g, Daily PRN  acetaminophen, 650 mg, Q6H PRN   Or  acetaminophen, 650 mg, Q6H PRN  albuterol, 2.5 mg, Q2H PRN  prochlorperazine, 10 mg, Q8H PRN   Or  prochlorperazine, 10 mg, Q6H PRN         Objective:  BP (!) 156/119   Pulse 100   Temp 97.9 °F (36.6 °C) (Axillary)   Resp 22   Ht 1.854 m (6' 1\")   Wt 80.7 kg (178 lb)   SpO2 91%   BMI 23.48 kg/m²     General Appearance: alert and oriented to person, place and time and in NAD, pulmonary cachexia, laying in bed  Skin: warm and dry. Large ecchymosis to left anteromedial thigh  Head: normocephalic and atraumatic  Eyes: 
       [facility] Hospitalist Progress Note    Admitting Date and Time: 1/13/2025 11:38 AM  Admit Dx: COPD exacerbation (HCC) [J44.1]  Acute on chronic congestive heart failure, unspecified heart failure type (HCC) [I50.9]  Acute on chronic hypoxic respiratory failure [J96.21]    Subjective:  Patient is being followed for COPD exacerbation (HCC) [J44.1]  Acute on chronic congestive heart failure, unspecified heart failure type (HCC) [I50.9]  Acute on chronic hypoxic respiratory failure [J96.21]   Pt feels okay  Per RN: no additional concerns    ROS: denies fever, chills, cp, sob, n/v, HA unless otherwise noted above     digoxin  62.5 mcg Oral Daily    spironolactone  12.5 mg Oral Daily    atenolol  25 mg Oral Daily    enoxaparin  1 mg/kg SubCUTAneous BID    potassium chloride  20 mEq Oral Daily    bumetanide  1 mg IntraVENous BID    budesonide  0.5 mg Nebulization BID RT    arformoterol tartrate  15 mcg Nebulization BID RT    sodium chloride flush  5-40 mL IntraVENous 2 times per day    ipratropium 0.5 mg-albuterol 2.5 mg  1 Dose Inhalation Q4H WA RT     oxyCODONE-acetaminophen, 1 tablet, Q12H PRN   And  oxyCODONE, 2.5 mg, Q12H PRN  cyclobenzaprine, 5 mg, BID PRN  melatonin, 3 mg, Nightly PRN  sulfur hexafluoride microspheres, 2 mL, ONCE PRN  sodium chloride flush, 5-40 mL, PRN  sodium chloride, , PRN  polyethylene glycol, 17 g, Daily PRN  acetaminophen, 650 mg, Q6H PRN   Or  acetaminophen, 650 mg, Q6H PRN  albuterol, 2.5 mg, Q2H PRN  prochlorperazine, 10 mg, Q8H PRN   Or  prochlorperazine, 10 mg, Q6H PRN         Objective:  BP (!) 83/61   Pulse 84   Temp 98.2 °F (36.8 °C)   Resp 25   Ht 1.854 m (6' 1\")   Wt 80.7 kg (178 lb)   SpO2 98%   BMI 23.48 kg/m²     General Appearance: alert and oriented to person, place and time and in NAD, pulmonary cachexia, laying in bed  Skin: warm and dry  Head: normocephalic and atraumatic  Eyes: PERRL, EOMI, conjunctivae normal  Neck: neck supple, trachea midline 
       [facility] Hospitalist Progress Note    Admitting Date and Time: 1/13/2025 11:38 AM  Admit Dx: COPD exacerbation (HCC) [J44.1]  Acute on chronic congestive heart failure, unspecified heart failure type (HCC) [I50.9]  Acute on chronic hypoxic respiratory failure [J96.21]    Subjective:  Patient is being followed for COPD exacerbation (HCC) [J44.1]  Acute on chronic congestive heart failure, unspecified heart failure type (HCC) [I50.9]  Acute on chronic hypoxic respiratory failure [J96.21]   Pt feels okay  Per RN: no additional concerns    ROS: denies fever, chills, cp, sob, n/v, HA unless otherwise noted above     [START ON 1/19/2025] atenolol  25 mg Oral Daily    enoxaparin  1 mg/kg SubCUTAneous BID    potassium chloride  20 mEq Oral Daily    bumetanide  1 mg IntraVENous BID    budesonide  0.5 mg Nebulization BID RT    arformoterol tartrate  15 mcg Nebulization BID RT    cefepime  2,000 mg IntraVENous Q12H    sodium chloride flush  5-40 mL IntraVENous 2 times per day    ipratropium 0.5 mg-albuterol 2.5 mg  1 Dose Inhalation Q4H WA RT     oxyCODONE-acetaminophen, 1 tablet, Q12H PRN   And  oxyCODONE, 2.5 mg, Q12H PRN  sodium chloride flush, 5-40 mL, PRN  sodium chloride, , PRN  polyethylene glycol, 17 g, Daily PRN  acetaminophen, 650 mg, Q6H PRN   Or  acetaminophen, 650 mg, Q6H PRN  albuterol, 2.5 mg, Q2H PRN  prochlorperazine, 10 mg, Q8H PRN   Or  prochlorperazine, 10 mg, Q6H PRN         Objective:  BP 93/71   Pulse 80   Temp 97.7 °F (36.5 °C) (Oral)   Resp 20   Ht 1.854 m (6' 1\")   Wt 83 kg (182 lb 15.7 oz)   SpO2 91%   BMI 24.14 kg/m²     General Appearance: alert and oriented to person, place and time and in NAD, pulmonary cachexia, sitting in bed working on crossword  Skin: warm and dry  Head: normocephalic and atraumatic  Eyes: PERRL, EOMI, conjunctivae normal  Neck: neck supple, trachea midline   Pulmonary/Chest: diminished diffusely, clear lung sounds, 12L HFNC  Cardiovascular: RRR, no 
    Hospitalist Progress Note      Synopsis: Patient admitted on 1/13/2025 74 y.o. male with a history of end-stage emphysema chronically on 7-8L NC, HFpEF, pulmonary hypertension, status post lumbar fusion, thoracic ascending aortic aneurysm  presents with SOB and leg swelling.Patient has history of end-stage emphysema and is chronically on 7 L nasal cannula.  Reports shortness of breath for the past week, worsening last night.  Patient follows with Dr. Little. Patient is following with  for transplant evaluation.  Was 65% on 8 L nasal cannula and placed on nonrebreather 15 L.  Patient does also have a history of CHF.  Frequently admitted to hospital for COPD exacerbations and CHF exacerbations.  Tachycardic in ED.  CXR showed advanced emphysematous changes and chronic scarring within right lung base.  Pertinent abnormal labs: Neutrophils 84%, lymphocytes 10%, glucose 158, troponin 18, repeat 15, BNP 6154.  ED course included 3 doses DuoNeb, 2000 mg magnesium sulfate, 125 mg Solu-Medrol, 0.5 mg Bumex.     ASSESSMENT:    Principal Problem:    Acute on chronic hypoxic respiratory failure  Active Problems:    COPD exacerbation (HCC)  Resolved Problems:    * No resolved hospital problems. *       PLAN:    Acute on chronic hypoxic respiratory failure-patient wears 8L NC at baseline.  Now requiring 10L NC.  Wean O2 as tolerated.  Will order D-Dimer.  Palliative care cs    COPD exacerbation-continue steroids and breathing treatments scheduled. Consult to pulmonology, patient follows with Dr. Little, appreciate their input. Pt notes change in sputum, start cefepime    Acute on chronic HFpEF-BNP 6154. last EF 57% in October 2024.  Monitor I's and O's and daily weights. BP soft in ED. Bumex 0.5 mg twice daily IV.    Chronic pain syndrome-continue home Percocet    IgG deficiency    History of mycobacterium avium complex     Diet: ADULT DIET; Regular; No Added Salt (3-4 gm)  Code Status: Full Code  PT/OT Eval Status:     DVT 
    Hospitalist Progress Note      Synopsis: Patient admitted on 1/13/2025 74 y.o. male with a history of end-stage emphysema chronically on 7-8L NC, HFpEF, pulmonary hypertension, status post lumbar fusion, thoracic ascending aortic aneurysm  presents with SOB and leg swelling.Patient has history of end-stage emphysema and is chronically on 7 L nasal cannula.  Reports shortness of breath for the past week, worsening last night.  Patient follows with Dr. Little. Patient is following with  for transplant evaluation.  Was 65% on 8 L nasal cannula and placed on nonrebreather 15 L.  Patient does also have a history of CHF.  Frequently admitted to hospital for COPD exacerbations and CHF exacerbations.  Tachycardic in ED.  CXR showed advanced emphysematous changes and chronic scarring within right lung base.  Pertinent abnormal labs: Neutrophils 84%, lymphocytes 10%, glucose 158, troponin 18, repeat 15, BNP 6154.  ED course included 3 doses DuoNeb, 2000 mg magnesium sulfate, 125 mg Solu-Medrol, 0.5 mg Bumex.     ASSESSMENT:    Principal Problem:    Acute on chronic hypoxic respiratory failure  Active Problems:    COPD exacerbation (HCC)  Resolved Problems:    * No resolved hospital problems. *       PLAN:    Acute on chronic hypoxic respiratory failure-patient wears 8L NC at baseline.  Now requiring 10L NC.  Wean O2 as tolerated.  Will order D-Dimer.  Palliative care cs    COPD exacerbation-continue steroids and breathing treatments scheduled. Consult to pulmonology, patient follows with Dr. Little, appreciate their input. Pt notes change in sputum, start cefepime    Acute on chronic HFpEF-BNP 6154. last EF 57% in October 2024.  Monitor I's and O's and daily weights. BP soft in ED. Bumex 0.5 mg twice daily IV.    Chronic pain syndrome-continue home Percocet    IgG deficiency    History of mycobacterium avium complex     Diet: ADULT DIET; Regular; No Added Salt (3-4 gm)  Code Status: Limited  PT/OT Eval Status:     DVT 
    [facility] Quality Flow/Interdisciplinary Rounds Progress Note        Quality Flow Rounds held on January 27, 2025    Disciplines Attending:  Bedside Nurse, , , and Nursing Unit Leadership    Jason Cox was admitted on 1/13/2025 11:38 AM    Anticipated Discharge Date:  Expected Discharge Date: 01/24/25    Disposition:    Sandro Score:  Sandro Scale Score: 15    [facility] RISK OF UNPLANNED READMISSION 2.0             29.8 Total Score        Discussed patient goal for the day, patient clinical progression, and barriers to discharge.  The following Goal(s) of the Day/Commitment(s) have been identified:   check pulm and cardio ok for d/c to [facility], dc today to [facility] if ok       Juliana Cano RN  January 27, 2025        
  [facility]  [address], [address]  [place] [address]      Pulmonary Progress Note      SUBJECTIVE:  feeling slightly better with breathing, some cough and minimal sputum production, just back from cleaning up in bathroom which wore him out and saturations on 11 li HFNC around low 80's    OBJECTIVE    Medications    Continuous Infusions:   sodium chloride         Scheduled Meds:   budesonide  0.5 mg Nebulization BID RT    arformoterol tartrate  15 mcg Nebulization BID RT    cefepime  2,000 mg IntraVENous Q12H    sodium chloride flush  5-40 mL IntraVENous 2 times per day    enoxaparin  40 mg SubCUTAneous Daily    ipratropium 0.5 mg-albuterol 2.5 mg  1 Dose Inhalation Q4H WA RT    methylPREDNISolone  40 mg IntraVENous Q6H    Followed by    [START ON 2025] predniSONE  40 mg Oral Daily       PRN Meds:sodium chloride flush, sodium chloride, polyethylene glycol, acetaminophen **OR** acetaminophen, albuterol, prochlorperazine **OR** prochlorperazine    Physical    VITALS:  BP (!) 110/90   Pulse 98   Temp 98.8 °F (37.1 °C) (Oral)   Resp 22   Ht 1.854 m (6' 1\")   Wt 83.9 kg (185 lb)   SpO2 (!) 88%   BMI 24.41 kg/m²     24HR INTAKE/OUTPUT:      Intake/Output Summary (Last 24 hours) at 1/15/2025 0844  Last data filed at 1/15/2025 0000  Gross per 24 hour   Intake 150 ml   Output 1350 ml   Net -1200 ml       24HR PULSE OXIMETRY RANGE:    SpO2  Av.9 %  Min: 88 %  Max: 97 %    General appearance: alert, appears stated age, and cooperative  Lungs: rhonchi bibasilar with cough  Heart: regular rate and rhythm, S1, S2 normal, no murmur, click, rub or gallop  Abdomen: soft, non-tender; bowel sounds normal; no masses,  no organomegaly  Extremities: extremities normal, atraumatic, no cyanosis or edema  Neurologic: Mental status: Alert, oriented, thought content appropriate    Data    CBC:   Recent Labs     25  1201 25  0642 
  [facility]  [address], [address]  [place] [address]      Pulmonary Progress Note      SUBJECTIVE:  feeling similar with respiratory function, on 13 li HFNC saturating 92-95%, sitting up in bed when seen, not much cough/congestion    OBJECTIVE    Medications    Continuous Infusions:   sodium chloride         Scheduled Meds:   spironolactone  12.5 mg Oral Daily    atenolol  25 mg Oral Daily    enoxaparin  1 mg/kg SubCUTAneous BID    potassium chloride  20 mEq Oral Daily    bumetanide  1 mg IntraVENous BID    budesonide  0.5 mg Nebulization BID RT    arformoterol tartrate  15 mcg Nebulization BID RT    sodium chloride flush  5-40 mL IntraVENous 2 times per day    ipratropium 0.5 mg-albuterol 2.5 mg  1 Dose Inhalation Q4H WA RT       PRN Meds:sulfur hexafluoride microspheres, oxyCODONE-acetaminophen **AND** oxyCODONE, sodium chloride flush, sodium chloride, polyethylene glycol, acetaminophen **OR** acetaminophen, albuterol, prochlorperazine **OR** prochlorperazine    Physical    VITALS:  /62   Pulse 85   Temp 96.9 °F (36.1 °C) (Oral)   Resp (!) 1   Ht 1.854 m (6' 1\")   Wt 80.7 kg (178 lb)   SpO2 95%   BMI 23.48 kg/m²     24HR INTAKE/OUTPUT:      Intake/Output Summary (Last 24 hours) at 2025 0842  Last data filed at 2025 0645  Gross per 24 hour   Intake --   Output 950 ml   Net -950 ml       24HR PULSE OXIMETRY RANGE:    SpO2  Av.2 %  Min: 85 %  Max: 96 %    General appearance: alert, appears stated age, and cooperative  Lungs: rhonchi bibasilar with cough  Heart: regular rate and rhythm, S1, S2 normal, no murmur, click, rub or gallop  Abdomen: soft, non-tender; bowel sounds normal; no masses,  no organomegaly  Extremities: extremities normal, atraumatic, no cyanosis or edema  Neurologic: Mental status: Alert, oriented, thought content appropriate    Data    CBC:   Recent Labs     25  0455   WBC 8.1   HGB 15.3 
  [facility]  [address], [address]  [place] [address]      Pulmonary Progress Note      SUBJECTIVE:  feeling similar with respiratory function, on 13 li HFNC saturating 87-88%, sitting up in bed when seen, started on atenolol for HR control by Cardiology and echo ordered to assess cardiac function    OBJECTIVE    Medications    Continuous Infusions:   sodium chloride         Scheduled Meds:   [START ON 2025] atenolol  25 mg Oral Daily    enoxaparin  1 mg/kg SubCUTAneous BID    potassium chloride  20 mEq Oral Daily    bumetanide  1 mg IntraVENous BID    budesonide  0.5 mg Nebulization BID RT    arformoterol tartrate  15 mcg Nebulization BID RT    cefepime  2,000 mg IntraVENous Q12H    sodium chloride flush  5-40 mL IntraVENous 2 times per day    ipratropium 0.5 mg-albuterol 2.5 mg  1 Dose Inhalation Q4H WA RT       PRN Meds:oxyCODONE-acetaminophen **AND** oxyCODONE, sodium chloride flush, sodium chloride, polyethylene glycol, acetaminophen **OR** acetaminophen, albuterol, prochlorperazine **OR** prochlorperazine    Physical    VITALS:  BP 93/71   Pulse 80   Temp 97.7 °F (36.5 °C) (Oral)   Resp 20   Ht 1.854 m (6' 1\")   Wt 83 kg (182 lb 15.7 oz)   SpO2 93%   BMI 24.14 kg/m²     24HR INTAKE/OUTPUT:      Intake/Output Summary (Last 24 hours) at 2025 1459  Last data filed at 2025 0656  Gross per 24 hour   Intake 500 ml   Output 1375 ml   Net -875 ml       24HR PULSE OXIMETRY RANGE:    SpO2  Av.6 %  Min: 87 %  Max: 98 %    General appearance: alert, appears stated age, and cooperative  Lungs: rhonchi bibasilar with cough  Heart: regular rate and rhythm, S1, S2 normal, no murmur, click, rub or gallop  Abdomen: soft, non-tender; bowel sounds normal; no masses,  no organomegaly  Extremities: extremities normal, atraumatic, no cyanosis or edema  Neurologic: Mental status: Alert, oriented, thought content appropriate    Data  
  [facility] Quality Flow/Interdisciplinary Rounds Progress Note        Quality Flow Rounds held on January 15, 2025    Disciplines Attending:  Bedside Nurse, , , and Nursing Unit Leadership    Jason Cox was admitted on 1/13/2025 11:38 AM    Anticipated Discharge Date:       Disposition:    Sandro Score:  Sandro Scale Score: 22    [facility] RISK OF UNPLANNED READMISSION 2.0             21.1 Total Score        Discussed patient goal for the day, patient clinical progression, and barriers to discharge.  The following Goal(s) of the Day/Commitment(s) have been identified:   discharge planning, IV steroids, pulm plan      Sameer Venegas RN  January 15, 2025        
  [facility] Quality Flow/Interdisciplinary Rounds Progress Note        Quality Flow Rounds held on January 14, 2025    Disciplines Attending:  Bedside Nurse, , , and Nursing Unit Leadership    Jason Cox was admitted on 1/13/2025 11:38 AM    Anticipated Discharge Date:       Disposition:    Sandro Score:  Sandro Scale Score: 21    [facility] RISK OF UNPLANNED READMISSION 2.0             21 Total Score        Discussed patient goal for the day, patient clinical progression, and barriers to discharge.  The following Goal(s) of the Day/Commitment(s) have been identified:   wean oxygen as able, iv solumedrol q6h      Juliana Cano RN  January 14, 2025        
  [facility] Quality Flow/Interdisciplinary Rounds Progress Note        Quality Flow Rounds held on January 21, 2025    Disciplines Attending:  Bedside Nurse, , , and Nursing Unit Leadership    Jason Cox was admitted on 1/13/2025 11:38 AM    Anticipated Discharge Date:  Expected Discharge Date: 01/20/25    Disposition:    Sandro Score:  Sandro Scale Score: 20    [facility] RISK OF UNPLANNED READMISSION 2.0             22.9 Total Score        Discussed patient goal for the day, patient clinical progression, and barriers to discharge.  The following Goal(s) of the Day/Commitment(s) have been identified:   AIRVO, iv bumex, aldactone and digoxin, 1mg/kg lovenox, nebs q4h      Juliana Cano RN  January 21, 2025        
  [facility] Quality Flow/Interdisciplinary Rounds Progress Note        Quality Flow Rounds held on January 22, 2025    Disciplines Attending:  Bedside Nurse, , , and Nursing Unit Leadership    Jason Cox was admitted on 1/13/2025 11:38 AM    Anticipated Discharge Date:  Expected Discharge Date: 01/24/25    Disposition:    Sandro Score:  Sandro Scale Score: 20    [facility] RISK OF UNPLANNED READMISSION 2.0             25.3 Total Score        Discussed patient goal for the day, patient clinical progression, and barriers to discharge.  The following Goal(s) of the Day/Commitment(s) have been identified:   Iv bumex +aldactone and digoxin, wean oxygen as able.       Juliana Cano RN  January 22, 2025        
  [facility] Quality Flow/Interdisciplinary Rounds Progress Note        Quality Flow Rounds held on January 17, 2025    Disciplines Attending:  Bedside Nurse, , , and Nursing Unit Leadership    Jason Cox was admitted on 1/13/2025 11:38 AM    Anticipated Discharge Date:       Disposition:    Sandro Score:  Sandro Scale Score: 20    [facility] RISK OF UNPLANNED READMISSION 2.0             21.8 Total Score        Discussed patient goal for the day, patient clinical progression, and barriers to discharge.  The following Goal(s) of the Day/Commitment(s) have been identified:   iv bumex, iv cefepime, 1mg/kg lovenox, po pred, wean oxygen as able to BL 7-8 L      Juliana Cano RN  January 17, 2025        
  [facility] Quality Flow/Interdisciplinary Rounds Progress Note        Quality Flow Rounds held on January 16, 2025    Disciplines Attending:  Bedside Nurse, , , and Nursing Unit Leadership    Jason Cox was admitted on 1/13/2025 11:38 AM    Anticipated Discharge Date:       Disposition:    Sandro Score:  Sandro Scale Score: 21    [facility] RISK OF UNPLANNED READMISSION 2.0             21.3 Total Score        Discussed patient goal for the day, patient clinical progression, and barriers to discharge.  The following Goal(s) of the Day/Commitment(s) have been identified:   discharge planning, pulm, palliative, IV ABX, IV steroids      Sameer Venegas RN  January 16, 2025        
  [facility] Quality Flow/Interdisciplinary Rounds Progress Note        Quality Flow Rounds held on January 20, 2025    Disciplines Attending:  Bedside Nurse, , , and Nursing Unit Leadership    Jason Cox was admitted on 1/13/2025 11:38 AM    Anticipated Discharge Date:  Expected Discharge Date: 01/20/25    Disposition:    Sandro Score:  Sandro Scale Score: 21    [facility] RISK OF UNPLANNED READMISSION 2.0             21.2 Total Score        Discussed patient goal for the day, patient clinical progression, and barriers to discharge.  The following Goal(s) of the Day/Commitment(s) have been identified:   iv bumex, nebs, monitor lasb, + aldactone and digoxin, wean oxygen as able.       Juliana Cano RN  January 20, 2025        
  [facility] Quality Flow/Interdisciplinary Rounds Progress Note        Quality Flow Rounds held on January 24, 2025    Disciplines Attending:  Bedside Nurse, , , and Nursing Unit Leadership    Jason Cox was admitted on 1/13/2025 11:38 AM    Anticipated Discharge Date:  Expected Discharge Date: 01/24/25    Disposition:    Sandro Score:  Sandro Scale Score: 17    [facility] RISK OF UNPLANNED READMISSION 2.0             27.6 Total Score        Discussed patient goal for the day, patient clinical progression, and barriers to discharge.  The following Goal(s) of the Day/Commitment(s) have been identified:   discharge planning, wean O2, cardio, pulm, need chest CT      Sameer Venegas RN  January 24, 2025        
  Palliative Care Department  [phone]  Palliative Care Progress Note  Provider Vanessa Harper, APRN - CNP    Jason Cox  [phone]  Hospital Day: 8  Date of Initial Consult: 1/14/2025  Referring Provider: Sabino Robison MD  Palliative Medicine was consulted for assistance with: Goals of care \"chronic respiratory failure baseline 8 L NC\"    HPI:   Jason Cox is a 74 y.o. with a medical history of stage emphysema on 7-8 L O2 via NC at baseline, HFpEF, pulmonary hypertension, and s/p lumbar fusion who was admitted on 1/13/2025 from home with a CHIEF COMPLAINT of shortness of breath and leg swelling. Reports shortness of breath for the past week, worsening last night.  Patient follows with Dr. Little. Patient is following with  for transplant evaluation.  Was 65% on 8 L nasal cannula and placed on nonrebreather 15 L. Frequently admitted to hospital for COPD exacerbations and CHF exacerbations. Tachycardic in ED. CXR showed advanced emphysematous changes and chronic scarring within right lung base.  Patient was admitted for further medical management.  ASSESSMENT/PLAN:     Pertinent Hospital Diagnoses     Acute on chronic respiratory failure; wears 8 L O2 via NC at baseline  COPD exacerbation  Acute on chronic HFpEF  Chronic pain syndrome    Palliative Care Encounter / Counseling Regarding Goals of Care  Please see detailed goals of care discussion as below  At this time, Jason Cox, Does have capacity for medical decision-making.  Capacity is time limited and situation/question specific  During encounter there was no surrogate medical decision-maker needed  Outcome of goals of care meeting:   Change CODE STATUS to limited-DNR-CCA/DNI  Code status Limited DNR CCA/DNI  Advanced Directives: no POA or living will in [facility]  Surrogate/Legal NOK:  Tara Cox (spouse) [phone]    Spiritual assessment: no spiritual distress identified  Bereavement and grief: to be determined  Referrals to: none 
  Palliative Care Department  [phone]  Palliative Care Progress Note  Provider Vanessa Harper, APRN - CNP    Jason Cox  [id number]  Hospital Day: 4  Date of Initial Consult: 1/14/2025  Referring Provider: Sabino Robison MD  Palliative Medicine was consulted for assistance with: Goals of care \"chronic respiratory failure baseline 8 L NC\"    HPI:   Jason Cox is a 74 y.o. with a medical history of stage emphysema on 7-8 L O2 via NC at baseline, HFpEF, pulmonary hypertension, and s/p lumbar fusion who was admitted on 1/13/2025 from home with a CHIEF COMPLAINT of shortness of breath and leg swelling. Reports shortness of breath for the past week, worsening last night.  Patient follows with Dr. Little. Patient is following with  for transplant evaluation.  Was 65% on 8 L nasal cannula and placed on nonrebreather 15 L. Frequently admitted to hospital for COPD exacerbations and CHF exacerbations. Tachycardic in ED. CXR showed advanced emphysematous changes and chronic scarring within right lung base.  Patient was admitted for further medical management.  ASSESSMENT/PLAN:     Pertinent Hospital Diagnoses     Acute on chronic respiratory failure; wears 8 L O2 via NC at baseline  COPD exacerbation  Acute on chronic HFpEF  Chronic pain syndrome    Palliative Care Encounter / Counseling Regarding Goals of Care  Please see detailed goals of care discussion as below  At this time, Jason Cox, Does have capacity for medical decision-making.  Capacity is time limited and situation/question specific  During encounter there was no surrogate medical decision-maker needed  Outcome of goals of care meeting:   Change CODE STATUS to limited-DNR-CCA/DNI  Code status Limited DNR CCA/DNI  Advanced Directives: no POA or living will in [facility]  Surrogate/Legal NOK:  Tara Cox (spouse) [phone]    Spiritual assessment: no spiritual distress identified  Bereavement and grief: to be determined  Referrals to: none 
  [facility] Quality Flow/Interdisciplinary Rounds Progress Note        Quality Flow Rounds held on January 23, 2025    Disciplines Attending:  Bedside Nurse, , , and Nursing Unit Leadership    Jason Cox was admitted on 1/13/2025 11:38 AM    Anticipated Discharge Date:  Expected Discharge Date: 01/24/25    Disposition:    Sandro Score:  Sandro Scale Score: 17    [facility] RISK OF UNPLANNED READMISSION 2.0             27.4 Total Score        Discussed patient goal for the day, patient clinical progression, and barriers to discharge.  The following Goal(s) of the Day/Commitment(s) have been identified:   discharge planning, airvo, cardio, pulm, IV bumex      Sameer Venegas RN  January 23, 2025        
4 Eyes Skin Assessment     NAME:  Jason Cox  YOB: 1950  MEDICAL RECORD NUMBER:  [id number]    The patient is being assessed for  Admission    I agree that at least one RN has performed a thorough Head to Toe Skin Assessment on the patient. ALL assessment sites listed below have been assessed.      Areas assessed by both nurses:    Head, Face, Ears, Shoulders, Back, Chest, Arms, Elbows, Hands, Sacrum. Buttock, Coccyx, Ischium, Legs. Feet and Heels, and Under Medical Devices         Does the Patient have a Wound? No noted wound(s)       Sandro Prevention initiated by RN: No  Wound Care Orders initiated by RN: No    Pressure Injury (Stage 3,4, Unstageable, DTI, NWPT, and Complex wounds) if present, place Wound referral order by RN under : No    New Ostomies, if present place, Ostomy referral order under : No     Nurse 1 eSignature: Electronically signed by Evelia Duran RN on 1/14/25 at 1:19 AM EST    **SHARE this note so that the co-signing nurse can place an eSignature**    Nurse 2 eSignature: Electronically signed by Danielle Garza RN on 1/14/25 at 1:21 AM EST    
BP 84/50 sent message to Dr. Whitfield, new orders in place.   
Call placed to Dr. Bravo regarding BP 93/74 and administering Spirolactone.   
Call placed to Dr. Sargent regarding pt having a hard time breathing and unable to get an accurate O2 reading. ABG orders placed.   
Consult completed to Pulmonology via perfect serve phone call    Bianka Estevez - unit secretary  
Database initiated. Patient is A&O independent from home with wife. States he uses no assistive devices and wears 7-8 liters oxygen at baseline through [employer].   
Faith sent to Diya Ambriz for O2 sats of 85-88% on 10 L HF O2. Per Diya, pt baseline is 7-8 L, pulmonology is consulted, ensure pt is receiving treatments, and try to keep sat greater than 90%. No new orders at this time.   
Message sent to Dr. Piedra regarding patient's BP of 86/58. New orders placed by MD.   
Notified Dr Christine regarding BP 93/66  
Notified Dr Whitfield via secured messenger of new hematoma from the IV infiltrating in LUE, removed and replaced in RUE, continue to monitor.   
Notified Dr. Piedra of BP of 84/57. New orders placed by MD for lactic acid.        New order placed by MD for 250cc bolus.  
Notified Jade Martinez in regards to the patients blood pressures. No new orders at this time.  
Ok for d/c to select per Dr Little and Dr Bravo at this time   
On assessment pt's left upper thigh/groin had significant amount of red/purple discoloration. Pt states that this happened yesterday when he was left on the bedside commode to long. Pt states that he pulled something in his groin. Pt has been in pain since. Dr. Christine notified via [employer] (TTN) regarding pt's assessment.   
PROGRESS NOTE       PATIENT PROBLEM LIST:  Patient Active Problem List   Diagnosis Code    COPD (chronic obstructive pulmonary disease) ([facility]) J44.9    COPD exacerbation (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) I50.33    Chronic respiratory failure with hypoxia J96.11    Chronic pain syndrome G89.4    Lumbar degenerative disc disease M51.369    Chronic low back pain M54.50, G89.29    Immunocompromised ([facility]) D84.9    Iron deficiency E61.1    Pulmonary hypertension ([facility]) I27.20    Chronic diastolic heart failure ([facility]) I50.32    Acute on chronic respiratory failure with hypoxia J96.21    IgG deficiency ([facility]) D80.3    Acute on chronic hypoxic respiratory failure J96.21    COPD with acute exacerbation ([facility]) J44.1    Palliative care encounter Z51.5    Goals of care, counseling/discussion Z71.89       SUBJECTIVE:  Jason Cox is resting comfortably but even at rest has mild shortness of breath with oxygen supplementation in place.  So far today up to this point he denied any chest discomfort palpitations or lightheadedness presently.  He remains in sinus rhythm with infrequent PVCs.  REVIEW OF SYSTEMS:  General ROS: positive for - fatigue, malaise.  Psychological ROS: negative for - anxiety , depression  Ophthalmic ROS: positive for - decreased vision and utilizes corrective lenses for visual acuity.  ENT ROS: negative  Allergy and Immunology ROS: negative  Hematological and Lymphatic ROS: negative  Endocrine: no heat or cold intolerance and no polyphagia, polydipsia, or polyuria  Respiratory ROS: positive for - cough, shortness of breath, and wheezing  Cardiovascular ROS: positive for - dyspnea on exertion, irregular heartbeat, rapid heart rate, and shortness of breath.  Gastrointestinal ROS: no abdominal 
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    Acute on chronic heart failure with preserved ejection fraction (HFpEF) ([facility]) I50.33    Chronic respiratory failure with hypoxia J96.11    Chronic pain syndrome G89.4    Lumbar degenerative disc disease M51.369    Chronic low back pain M54.50, G89.29    Immunocompromised ([facility]) D84.9    Iron deficiency E61.1    Pulmonary hypertension ([facility]) I27.20    Chronic diastolic heart failure ([facility]) I50.32    Acute on chronic respiratory failure with hypoxia J96.21    IgG deficiency ([facility]) D80.3    Acute on chronic hypoxic respiratory failure J96.21    COPD with acute exacerbation ([facility]) J44.1       SUBJECTIVE:  Jason Cox states he feels somewhat better and is not as short of breath.  Likewise he remains in sinus rhythm with infrequent PVCs.    REVIEW OF SYSTEMS:  General ROS: positive for - fatigue, malaise.  Psychological ROS: negative for - anxiety , depression  Ophthalmic ROS: positive for - decreased vision and utilizes corrective lenses for visual acuity.  ENT ROS: negative  Allergy and Immunology ROS: negative  Hematological and Lymphatic ROS: negative  Endocrine: no heat or cold intolerance and no polyphagia, polydipsia, or polyuria  Respiratory ROS: positive for - cough, shortness of breath, and wheezing  Cardiovascular ROS: positive for - dyspnea on exertion, irregular heartbeat, rapid heart rate, and shortness of breath.  Gastrointestinal ROS: no abdominal pain, change in bowel habits, or black or bloody stools  Genito-Urinary ROS: no nocturia, dysuria, trouble voiding, frequency or hematuria  Musculoskeletal ROS: negative for- myalgias, arthralgias, or 
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    Acute on chronic heart failure with preserved ejection fraction (HFpEF) ([facility]) I50.33    Chronic respiratory failure with hypoxia J96.11    Chronic pain syndrome G89.4    Lumbar degenerative disc disease M51.369    Chronic low back pain M54.50, G89.29    Immunocompromised ([facility]) D84.9    Iron deficiency E61.1    Pulmonary hypertension ([facility]) I27.20    Chronic diastolic heart failure ([facility]) I50.32    Acute on chronic respiratory failure with hypoxia J96.21    IgG deficiency ([facility]) D80.3    Acute on chronic hypoxic respiratory failure J96.21    COPD with acute exacerbation ([facility]) J44.1    Palliative care encounter Z51.5    Goals of care, counseling/discussion Z71.89       SUBJECTIVE:  Jason Cox notes that he had a rough evening and did not sleep well secondary to shortness of breath.  He denies any chest discomfort palpitations or lightheadedness presently.  REVIEW OF SYSTEMS:  General ROS: positive for - fatigue, malaise.  Psychological ROS: negative for - anxiety , depression  Ophthalmic ROS: positive for - decreased vision and utilizes corrective lenses for visual acuity.  ENT ROS: negative  Allergy and Immunology ROS: negative  Hematological and Lymphatic ROS: negative  Endocrine: no heat or cold intolerance and no polyphagia, polydipsia, or polyuria  Respiratory ROS: positive for - cough, shortness of breath, and wheezing  Cardiovascular ROS: positive for - dyspnea on exertion, irregular heartbeat, rapid heart rate, and shortness of breath.  Gastrointestinal ROS: no abdominal pain, change in bowel habits, or black or bloody stools  Genito-Urinary ROS: no nocturia, 
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    Acute on chronic heart failure with preserved ejection fraction (HFpEF) ([facility]) I50.33    Chronic respiratory failure with hypoxia J96.11    Chronic pain syndrome G89.4    Lumbar degenerative disc disease M51.369    Chronic low back pain M54.50, G89.29    Immunocompromised ([facility]) D84.9    Iron deficiency E61.1    Pulmonary hypertension ([facility]) I27.20    Chronic diastolic heart failure ([facility]) I50.32    Acute on chronic respiratory failure with hypoxia J96.21    IgG deficiency ([facility]) D80.3    Acute on chronic hypoxic respiratory failure J96.21    COPD with acute exacerbation ([facility]) J44.1    Palliative care encounter Z51.5    Goals of care, counseling/discussion Z71.89       SUBJECTIVE:  Jason Cox is resting comfortably but even at rest has mild shortness of breath with oxygen supplementation in place.  So far today up to this point he denied any chest discomfort palpitations or lightheadedness presently.  He remains in sinus rhythm with infrequent PVCs.  REVIEW OF SYSTEMS:  General ROS: positive for - fatigue, malaise.  Psychological ROS: negative for - anxiety , depression  Ophthalmic ROS: positive for - decreased vision and utilizes corrective lenses for visual acuity.  ENT ROS: negative  Allergy and Immunology ROS: negative  Hematological and Lymphatic ROS: negative  Endocrine: no heat or cold intolerance and no polyphagia, polydipsia, or polyuria  Respiratory ROS: positive for - cough, shortness of breath, and wheezing  Cardiovascular ROS: positive for - dyspnea on exertion, irregular heartbeat, rapid heart rate, and shortness of breath.  Gastrointestinal ROS: no abdominal 
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    Acute on chronic heart failure with preserved ejection fraction (HFpEF) ([facility]) I50.33    Chronic respiratory failure with hypoxia J96.11    Chronic pain syndrome G89.4    Lumbar degenerative disc disease M51.369    Chronic low back pain M54.50, G89.29    Immunocompromised ([facility]) D84.9    Iron deficiency E61.1    Pulmonary hypertension ([facility]) I27.20    Chronic diastolic heart failure ([facility]) I50.32    Acute on chronic respiratory failure with hypoxia J96.21    IgG deficiency ([facility]) D80.3    Acute on chronic hypoxic respiratory failure J96.21    COPD with acute exacerbation ([facility]) J44.1    Palliative care encounter Z51.5    Goals of care, counseling/discussion Z71.89       SUBJECTIVE:  Jason Cox states that he remains short of breath with minimal effort and desaturates quite easily without oxygen pretty much constantly in place.  He denies any chest discomfort palpitations or lightheadedness presently.  He remains in sinus rhythm with infrequent PVCs.  REVIEW OF SYSTEMS:  General ROS: positive for - fatigue, malaise.  Psychological ROS: negative for - anxiety , depression  Ophthalmic ROS: positive for - decreased vision and utilizes corrective lenses for visual acuity.  ENT ROS: negative  Allergy and Immunology ROS: negative  Hematological and Lymphatic ROS: negative  Endocrine: no heat or cold intolerance and no polyphagia, polydipsia, or polyuria  Respiratory ROS: positive for - cough, shortness of breath, and wheezing  Cardiovascular ROS: positive for - dyspnea on exertion, irregular heartbeat, rapid heart rate, and shortness of breath.  Gastrointestinal ROS: no abdominal 
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    Acute on chronic heart failure with preserved ejection fraction (HFpEF) ([facility]) I50.33    Chronic respiratory failure with hypoxia J96.11    Chronic pain syndrome G89.4    Lumbar degenerative disc disease M51.369    Chronic low back pain M54.50, G89.29    Immunocompromised ([facility]) D84.9    Iron deficiency E61.1    Pulmonary hypertension ([facility]) I27.20    Chronic diastolic heart failure ([facility]) I50.32    Acute on chronic respiratory failure with hypoxia J96.21    IgG deficiency ([facility]) D80.3    Acute on chronic hypoxic respiratory failure J96.21    COPD with acute exacerbation ([facility]) J44.1       SUBJECTIVE:  Jasno Cox states he feels about the same but comfortable at rest and short of breath with exertion..  Once again, he remains in sinus rhythm with infrequent PVCs.    REVIEW OF SYSTEMS:  General ROS: positive for - fatigue, malaise.  Psychological ROS: negative for - anxiety , depression  Ophthalmic ROS: positive for - decreased vision and utilizes corrective lenses for visual acuity.  ENT ROS: negative  Allergy and Immunology ROS: negative  Hematological and Lymphatic ROS: negative  Endocrine: no heat or cold intolerance and no polyphagia, polydipsia, or polyuria  Respiratory ROS: positive for - cough, shortness of breath, and wheezing  Cardiovascular ROS: positive for - dyspnea on exertion, irregular heartbeat, rapid heart rate, and shortness of breath.  Gastrointestinal ROS: no abdominal pain, change in bowel habits, or black or bloody stools  Genito-Urinary ROS: no nocturia, dysuria, trouble voiding, frequency or hematuria  Musculoskeletal ROS: negative for- myalgias, 
Palliative notified of new consult via [employer]  
Patient refusing CT today. Patient extremely SOB and put back on Airvo.  
Patient refusing continuous Pulse oximeter. Education provided.   
Patient refusing q2 turns stating it worsens his SOB. Education provided at this time.  
Pt declining Q2 hour turns. Educated importance of turning, repositioning and skin breakdown. Pt still declined stating he is in too much pain.   
Pt's BP 90/55. Dr. Christine notified via [employer] regarding BP and parameters to hold bumex.  
Sammy Served Dr. Christine regarding pt feeling like he was going to pass out, felt hot, and having foggy vision. Pt feels he might of had some anxiety as he was watching his O2 fluctuate on the monitor.   
Sammy served Dr. Christine regarding administering Spironolactone and Bumex with Bp 99/69. Awaiting response.   
Spiritual Health History and Assessment/Progress Note  [facility]    Spiritual/Emotional Needs,  ,  ,      Name: Jason Cox MRN: [id number]    Age: 74 y.o.     Sex: male   Language: English   Oriental orthodox: Alevism   Acute on chronic hypoxic respiratory failure     Date: 1/23/2025                           Spiritual Assessment began in [facility] INTERMEDIATE        Referral/Consult From: Rounding   Encounter Overview/Reason: Spiritual/Emotional Needs  Service Provided For: Patient    Mihaela, Belief, Meaning:   Patient is connected with a mihaela tradition or spiritual practice  Family/Friends No family/friends present      Importance and Influence:  Patient has no beliefs influential to healthcare decision-making identified during this visit  Family/Friends No family/friends present    Community:  Patient feels well-supported. Support system includes: Spouse/Partner and Extended family  Family/Friends No family/friends present    Assessment and Plan of Care:     Patient Interventions include: Facilitated expression of thoughts and feelings and Affirmed coping skills/support systems  Family/Friends Interventions include: No family/friends present    Patient Plan of Care: Spiritual Care available upon further referral  Family/Friends Plan of Care: No family/friends present    Electronically signed by Chaplain Courtney on 1/23/2025 at 3:15 PM    
Spiritual Health History and Assessment/Progress Note  [facility]    Initial Encounter, Palliative Care,  ,  ,      Name: Jason Cox MRN: [id number]    Age: 74 y.o.     Sex: male   Language: English   Uatsdin: Rastafari   Acute on chronic hypoxic respiratory failure     Date: 1/14/2025                           Spiritual Assessment began in [facility] INTERMEDIATE        Referral/Consult From: Palliative Care   Encounter Overview/Reason: Initial Encounter, Palliative Care  Service Provided For: Patient    Mihaela, Belief, Meaning:   Patient identifies as spiritual, is connected with a mihaela tradition or spiritual practice, and has beliefs or practices that help with coping during difficult times  Family/Friends No family/friends present      Importance and Influence:  Patient has spiritual/personal beliefs that influence decisions regarding their health  Family/Friends No family/friends present    Community:  Patient feels well-supported. Support system includes: Spouse/Partner  Family/Friends No family/friends present    Assessment and Plan of Care:     Patient Interventions include: Facilitated expression of thoughts and feelings and Provided sacramental/Adventism ritual  Family/Friends Interventions include: No family/friends present    Patient Plan of Care: Spiritual Care available upon further referral  Family/Friends Plan of Care: Spiritual Care available upon further referral    Electronically signed by ISAAC Burciaga on 1/14/2025 at 3:13 PM   
RT Kamla, Dea LIMA, APRN - NP   15 mcg at 01/24/25 0801    sodium chloride flush 0.9 % injection 5-40 mL  5-40 mL IntraVENous 2 times per day Jenn Toscano APRN - CNP   10 mL at 01/24/25 0928    sodium chloride flush 0.9 % injection 5-40 mL  5-40 mL IntraVENous PRN Jenn Toscano APRN - CNP   10 mL at 01/18/25 1736    0.9 % sodium chloride infusion   IntraVENous PRN Jenn Toscano APRN - CNP        polyethylene glycol (GLYCOLAX) packet 17 g  17 g Oral Daily PRN Jenn Toscano APRN - CNP   17 g at 01/22/25 1022    acetaminophen (TYLENOL) tablet 650 mg  650 mg Oral Q6H PRN Jenn Toscano APRN - CNP   650 mg at 01/24/25 0521    Or    acetaminophen (TYLENOL) suppository 650 mg  650 mg Rectal Q6H PRN Jenn Toscano APRN - CNP        ipratropium 0.5 mg-albuterol 2.5 mg (DUONEB) nebulizer solution 1 Dose  1 Dose Inhalation Q4H WA RT Jenn Toscano APRN - CNP   1 Dose at 01/24/25 1553    albuterol (PROVENTIL) (2.5 MG/3ML) 0.083% nebulizer solution 2.5 mg  2.5 mg Nebulization Q2H PRN Jenn Toscano APRN - CNP        prochlorperazine (COMPAZINE) tablet 10 mg  10 mg Oral Q8H PRN Jenn Toscano APRN - CNP        Or    prochlorperazine (COMPAZINE) injection 10 mg  10 mg IntraVENous Q6H PRN Jenn Toscano APRN - CNP           Recent Complaints:  Review of Systems  Vitals:    01/24/25 1538   BP: (!) 84/57   Pulse: (!) 108   Resp: 23   Temp: 98.1 °F (36.7 °C)   SpO2: 96%     Physical Exam  Vitals reviewed.   Constitutional:       Appearance: Normal appearance.   Cardiovascular:      Rate and Rhythm: Regular rhythm. Tachycardia present.   Pulmonary:      Effort: Tachypnea, accessory muscle usage and respiratory distress present.      Breath sounds: Decreased air movement present.   Neurological:      Mental Status: He is alert.         Recent Labs     01/22/25  0517 01/23/25  0748 01/24/25  0237    132 131*   K 4.5 4.8 4.4   CL 99 97* 96*   CO2 26 23 26   BUN 
for input(s): \"PH\", \"PO2\", \"PCO2\", \"HCO3\", \"BE\", \"O2SAT\", \"METHB\", \"O2HB\", \"COHB\", \"O2CON\", \"HHB\", \"THB\" in the last 72 hours.        Recent Films:  US SOFT TISSUE LIMITED AREA   Final Result   Large complex area in the left inner thigh possibly representing a large   hematoma.      RECOMMENDATION:   Clinical follow-up.         XR CHEST PORTABLE   Final Result   1. Stable mild patchy infiltrate in the right infrahilar lung, atelectasis   versus pneumonia.   2. Stable mild elevation of the right lateral hemidiaphragm with blurring of   the lateral heart border, probably scarring from previous inflammatory   disease.   3. No new abnormalities.         XR PELVIS (1-2 VIEWS)   Final Result   1. No sign of fracture of the left femur or pelvis.   2. Changes of laminectomy and fusion at L4 and L5 with moderate scoliosis of   the lumbar spine convex towards the left.   3. Severe L5-S1 disc degenerative disease.         XR FEMUR LEFT (MIN 2 VIEWS)   Final Result   1. No sign of fracture of the left femur or pelvis.   2. Changes of laminectomy and fusion at L4 and L5 with moderate scoliosis of   the lumbar spine convex towards the left.   3. Severe L5-S1 disc degenerative disease.         XR CHEST PORTABLE   Final Result   1. There are no signs of an acute cardiopulmonary process.   2. Hyperinflation of the lungs concerning for underlying signs of obstructive   physiology.         XR CHEST (2 VW)   Final Result   Stable patchy airspace disease identified lower lung fields bilaterally with   a small right pleural effusion.  No new focal parenchymal opacification   present.         XR CHEST PORTABLE   Final Result   1. Advanced emphysematous changes   2. Chronic pleuroparenchymal scarring seen within the right lung base.         CT CHEST WO CONTRAST    (Results Pending)       Assessment:  COPD exacerbation.   Acute on chronic hypoxic respiratory failure. Worsening.   History of mycobacterium avium complex.  Right heart failure. 
upper extermities bilaterally; present 1+ DP  bilaterally and present 1+ PT bilaterally.     Data:   Scheduled Meds: Reviewed  Continuous Infusions:    sodium chloride         Intake/Output Summary (Last 24 hours) at 1/20/2025 1219  Last data filed at 1/20/2025 1214  Gross per 24 hour   Intake --   Output 1850 ml   Net -1850 ml     CBC:   Recent Labs     01/18/25  0455   WBC 8.1   HGB 15.3   HCT 48.2        BMP:  Recent Labs     01/18/25  0455      K 4.1      CO2 31*   BUN 24*   CREATININE 0.9   LABGLOM >90     ABGs:   Lab Results   Component Value Date/Time    PH 7.469 11/23/2024 12:50 PM    PO2 56.8 11/23/2024 12:50 PM    PCO2 32.6 11/23/2024 12:50 PM     INR: No results for input(s): \"INR\" in the last 72 hours.  PRO-BNP:   Lab Results   Component Value Date    PROBNP 7,442 (H) 01/19/2025    PROBNP 8,471 (H) 01/18/2025      TSH:   Lab Results   Component Value Date    TSH 0.36 08/08/2023      Cardiac Injury Profile: No results for input(s): \"TROPHS\" in the last 72 hours.   Lipid Profile:   Lab Results   Component Value Date/Time    TRIG 42 08/08/2023 04:30 AM    HDL 64 08/08/2023 04:30 AM    LDL 47 08/08/2023 04:30 AM    LDL 56 06/12/2023 02:00 AM    CHOL 119 08/08/2023 04:30 AM    CHOL 146 06/12/2023 02:00 AM      Hemoglobin A1C: No components found for: \"HGBA1C\"      RAD:   XR CHEST (2 VW)    Result Date: 1/16/2025  Stable patchy airspace disease identified lower lung fields bilaterally with a small right pleural effusion.  No new focal parenchymal opacification present.         EKG: See Report  Echo: See Report      IMPRESSIONS:  Patient Active Problem List   Diagnosis Code    COPD (chronic obstructive pulmonary disease) ([facility]) J44.9    COPD exacerbation ([facility]) J44.1    Thoracic ascending aortic aneurysm I71.21    S/P lumbar fusion Z98.1    Hilar adenopathy R59.0    Pulmonary Mycobacterium avium complex (MAC) infection ([facility]) A31.0    Acute on chronic heart failure with preserved ejection 
(MAC) infection (HCC) A31.0    Acute on chronic heart failure with preserved ejection fraction (HFpEF) (HCC) I50.33    Chronic respiratory failure with hypoxia J96.11    Chronic pain syndrome G89.4    Lumbar degenerative disc disease M51.369    Chronic low back pain M54.50, G89.29    Immunocompromised (HCC) D84.9    Iron deficiency E61.1    Pulmonary hypertension (HCC) I27.20    Chronic diastolic heart failure (HCC) I50.32    Acute on chronic respiratory failure with hypoxia J96.21    IgG deficiency (HCC) D80.3    Acute on chronic hypoxic respiratory failure J96.21    COPD with acute exacerbation (HCC) J44.1    Palliative care encounter Z51.5    Goals of care, counseling/discussion Z71.89       RECOMMENDATIONS:  Will obtain proBNP tomorrow morning and adjust diuretics accordingly maintain present selective beta 1 blocker for arrhythmia and blood pressure control in the presence of thoracic aortic aneurysm.  Prognosis guarded.    I have spent more than 25 minutes face to face with Jason Cox and reviewing notes and laboratory data, with greater than 50% of this time instructing and counseling the patient face to face regarding my findings and recommendations and I have answered all questions as posed to me by Mr. Cox.    Tre Bravo, DO FACP,FACC,[facility]      NOTE:  This report was transcribed using voice recognition software.  Every effort was made to ensure accuracy; however, inadvertent computerized transcription errors may be present        
hold.    Plan:  Continue aerosols  Continue diuretics per cardiology (-15.5 L)  Hold lovenox  Goal SpO2 87%-89%. Wean Airvo.   Increase activity.   Transfuse as needed      Time at the bedside, reviewing labs and radiographs, reviewing updated notes and consultations, discussing with staff and family was more than 50 minutes.    Please note that voice recognition technology was used in the preparation of this note and make therefore it may contain inadvertent transcription errors.  If the patient is a COVID 19 isolation patient, the above physical exam reflects that of the examining physician for the day.      Rolf Little MD,  M.D., F.C.C.P.  [facility]    [facility], [address] [place] [address], [address], [place] [address]  Office visits:  [address] [place] [address]  
 D-Dimer 451, largely unchanged from 444 on 11/2024.  Palliative care c/s  COPD exacerbation-cont breathing treatments, weaned from steroids. Consult to pulmonology, patient follows with Dr. Little, appreciate input. Pt notes change in sputum, on cefepime given hx of resistant Pseudomonas now completed course  Acute on chronic HFpEF-BNP 6154. last EF 57% in October 2024, repeat 1/2025 EF 53% w/ markedly reduced right ventricular function.  Monitor I's and O's and daily weights. BP labile. Adjust diuretics, add MRA. Add digoxin 62.5 (goal 0.4-0.9 ng/mL) for RV dysfunction. Cards c/s.  Pulm HTN- noted on ECHO 1/2025, complicating above, cont diuresis for offloading  NSVT- noted on tele, currently not on rate control, caution with BB given concurrent COPDe, appreciate Cards input  Arrhythmia- appreciate Cards input, per notes does not appear to have Afib though possibly MAT, given underlying COPD placed on B1 selective BB with slow titration  Chronic pain syndrome-continue home Percocet  IgG deficiency- aware, monitor  History of mycobacterium avium complex   Hypotension - held BB, bumex held, ordered IV lasix, 250 cc fluid bolus, added midodrine, dose increased, am cortisol.  Ecchymosis to left thigh - Large Hematoma,  pelvic XR and US of soft tissue neg for DVT, hold lovenox for now , hb stable, peripheral pulses palpable.   Ac on chr Anemia - ordered 1 unit PRBCs     DVT Prophylaxis - Lovenox full dose     Dispo: anticipate home once O2 reqs (Normal BL is 6 to 8 liters) are improved, VS  if continues on HFO, will need LTACH, select specilaity, no precert needed.      NOTE: This report was transcribed using voice recognition software. Every effort was made to ensure accuracy; however, inadvertent computerized transcription errors may be present.  Electronically signed by Savannah Whitfield MD on 1/26/2025 at 11:54 AM

## 2025-01-27 NOTE — PLAN OF CARE
Problem: ABCDS Injury Assessment  Goal: Absence of physical injury  Outcome: Completed     Problem: Discharge Planning  Goal: Discharge to home or other facility with appropriate resources  Outcome: Completed     Problem: Chronic Conditions and Co-morbidities  Goal: Patient's chronic conditions and co-morbidity symptoms are monitored and maintained or improved  Outcome: Completed     Problem: Safety - Adult  Goal: Free from fall injury  Outcome: Completed     Problem: Pain  Goal: Verbalizes/displays adequate comfort level or baseline comfort level  Outcome: Completed     Problem: Skin/Tissue Integrity  Goal: Absence of new skin breakdown  Description: 1.  Monitor for areas of redness and/or skin breakdown  2.  Assess vascular access sites hourly  3.  Every 4-6 hours minimum:  Change oxygen saturation probe site  4.  Every 4-6 hours:  If on nasal continuous positive airway pressure, respiratory therapy assess nares and determine need for appliance change or resting period.  Outcome: Completed

## 2025-01-27 NOTE — CARE COORDINATION
Social Work / Discharge Planning :Michael from [facility] can admit patient today to [facility]. Charge updated. Await D/C. SW to follow. Electronically signed by MALVIN Giles on 1/27/25 at 10:09 AM EST     Addendum: D/C order noted. Patient placed in Will call [phone]. SW to follow. Electronically signed by MALVIN Giles on 1/27/25 at 1:22 PM EST     Addendum: Patient will be discharged to [facility] today between 3:30 and 5:30 via [facility] Ambulance. Patient updated as well as spouse, nursing and Michael at [facility]. SW to follow. Electronically signed by MALVIN Giles on 1/27/25 at 2:57 PM EST

## 2025-01-27 NOTE — DISCHARGE SUMMARY
[facility] Hospitalist Physician Discharge Summary       [facility]  [address] [address]  [phone]          Activity level: As tolerated     Dispo: Home      Condition on discharge: Stable     Patient ID:  Jason Cox  [id number]  74 y.o.  1950    Admit date: 1/13/2025    Discharge date and time:  1/27/2025  1:06 PM    Admission Diagnoses: Principal Problem:    Acute on chronic hypoxic respiratory failure  Active Problems:    COPD exacerbation (HCC)    Palliative care encounter    Goals of care, counseling/discussion  Resolved Problems:    * No resolved hospital problems. *      Discharge Diagnoses: Principal Problem:    Acute on chronic hypoxic respiratory failure  Active Problems:    COPD exacerbation (HCC)    Palliative care encounter    Goals of care, counseling/discussion  Resolved Problems:    * No resolved hospital problems. *      Consults:  IP CONSULT TO HOSPITALIST  IP CONSULT TO PULMONOLOGY  IP CONSULT TO HEART FAILURE NURSE/COORDINATOR  IP CONSULT TO PALLIATIVE CARE  IP CONSULT TO VASCULAR ACCESS TEAM  IP CONSULT TO CARDIOLOGY  IP CONSULT TO VASCULAR ACCESS TEAM    Hospital Course:   Patient Jason Cox is a 74 y.o. presented with COPD exacerbation (HCC) [J44.1]  Acute on chronic congestive heart failure, unspecified heart failure type (HCC) [I50.9]  Acute on chronic hypoxic respiratory failure [J96.21]  Patient was admitted and managed for Acute on chronic hypoxic respiratory failure-patient wears 8L NC at baseline, now requiring Airvo with 50FIO2. Wean O2 as tolerated.  D-Dimer 451, largely unchanged from 444 on 11/2024.  Palliative care consulted, limited code status. COPD exacerbation-cont breathing treatments, weaned from steroids. Consult to pulmonology, patient follows with Dr. Little, appreciate input. Pt notes change in sputum, on cefepime given hx of resistant Pseudomonas now completed course  Acute on chronic HFpEF-BNP 6154.

## 2025-01-28 ENCOUNTER — CARE COORDINATION (OUTPATIENT)
Dept: CARE COORDINATION | Age: 75
End: 2025-01-28

## 2025-01-28 NOTE — CARE COORDINATION
Pt has been transferred to [facility] in [place] for further care. Will return to care coordination when he returns home.    Rula Solorzano RN  Ambulatory Care Manager  Cell: [phone]

## 2025-04-07 NOTE — ED PROVIDER NOTES
[facility] INTERMEDIATE  EMERGENCY DEPARTMENT ENCOUNTER        Pt Name: Jason Cox  MRN: [id number]  Birthdate 1950  Date of evaluation: 11/23/2024  Provider: Nancy Jasmine DO  PCP: Juni Loyd MD  Note Started: 4:42 PM EST 11/23/24    CHIEF COMPLAINT       Chief Complaint   Patient presents with   • Joint Swelling     Bilateral ankle swelling. Denies injury   • Leg Swelling     BLE swelling, onset a few days, says no hx of edema   • Shortness of Breath     Increase in SOB for past 5 days, normally on 7L NC       HISTORY OF PRESENT ILLNESS: 1 or more Elements     Limitations to history : None    Jason Cox is a 74-year-old male with past medical history of COPD/emphysema, on 7 L NC O2 at baseline at least for the past month, normally on 5 L but he got sick about a month ago and has been requiring increased oxygen; he also has a history of CHF and is on diuretics.  Patient endorses medication compliance.  For about 1 week patient's shortness of breath is increased and he has also been noticing edema of his bilateral ankles and feet.  The edema is new for him.  He denies any history of DVT or PE and he is not on blood thinning medications.  He also has been coughing a lot more but it is nonproductive.  Denies any hemoptysis.  No fevers or chills.  No chest pain or palpitations.  He reports significant dyspnea with exertion.  He denies any numbness or weakness anywhere, headaches dizziness or changes in vision.  About 2 weeks ago he had a bronchoscopy with Dr. Mohamud due to mucous plugging.      Nursing Notes were all reviewed and agreed with or any disagreements were addressed in the HPI.      REVIEW OF EXTERNAL NOTE :       Reviewed H&P from pulmonology visit on 11/11/2024.    Chart Review/External Note Review    Last Echo reviewed by Me:  Lab Results   Component Value Date    LVEF 59 06/12/2023           Controlled Substance Monitoring:    Acute and Chronic Pain Monitoring:   RX    Arterial Line    Date/Time: 4/7/2025 5:24 PM    Performed by: Angel House MD  Authorized by: Anshul Olvera MD    General Information and Staff    Procedure Start:  4/7/2025 5:24 PM  Procedure End:  4/7/2025 5:26 PM  Anesthesiologist:  Angel House MD  Performed By:  Anesthesiologist  Patient Location:  OR  Indication: continuous blood pressure monitoring and blood sampling needed    Site Identification: real time ultrasound guided, surface landmarks and image stored and retrievable    Preanesthetic Checklist: 2 patient identifiers, IV checked, risks and benefits discussed, monitors and equipment checked, pre-op evaluation, timeout performed, anesthesia consent and sterile technique used    Procedure Details    Catheter Size:  20 G  Catheter Length:  1 and 3/4 inch  Catheter Type:  Arrow  Seldinger Technique?: Yes    Laterality:  Right  Site:  Radial artery  Site Prep: chlorhexidine    Line Secured:  Tape and Tegaderm    Assessment    Events: patient tolerated procedure well with no complications      Medications  4/7/2025 5:24 PM      Additional Comments

## (undated) DEVICE — SOLUTION IV IRRIG 500ML 0.9% SODIUM CHL 2F7123

## (undated) DEVICE — SURGICAL PROCEDURE PACK BRONCH

## (undated) DEVICE — Device: Brand: MEDEX

## (undated) DEVICE — MASK RESP UNIV N95 4 PANEL HD STRP INDIVIDUALLY WRP LF

## (undated) DEVICE — Device

## (undated) DEVICE — SNARE ENDOSCP L240CM LOOP W13MM SHTH DIA2.4MM SM OVL FLX

## (undated) DEVICE — 1870+ HEALTH CARE PART RESP. 120/CASE: Brand: AURA™

## (undated) DEVICE — PAD SENSOR ALRM 13X13 IN THN PROF WIDE PRT MDT85 CHAIR WHT

## (undated) DEVICE — SPONGE GZ W4XL4IN RAYON POLY CVR W/NONWOVEN FAB STRL 2/PK

## (undated) DEVICE — SOLUTION IRRIG 500ML 0.9% SOD CHLO USP POUR PLAS BTL

## (undated) DEVICE — FORCEPS BX L240CM JAW DIA2.4MM ORNG L CAP W/ NDL DISP RAD

## (undated) DEVICE — CONMED DISPOSABLE MICROBIOLOGY BRUSH, Ø1 MM, 1.8 MM WORKING DIAMETER, 110 CM LENGTH: Brand: CONMED

## (undated) DEVICE — SYRINGE MED 50ML LUERLOCK TIP

## (undated) DEVICE — TRAP POLYP ETRAP

## (undated) DEVICE — GRADUATE TRIANG MEASURE 1000ML BLK PRNT

## (undated) DEVICE — AIRWAY PHARYNGEAL AD 10 CM INTUB